# Patient Record
Sex: MALE | Race: WHITE | NOT HISPANIC OR LATINO | Employment: FULL TIME | URBAN - METROPOLITAN AREA
[De-identification: names, ages, dates, MRNs, and addresses within clinical notes are randomized per-mention and may not be internally consistent; named-entity substitution may affect disease eponyms.]

---

## 2017-02-27 ENCOUNTER — HOSPITAL ENCOUNTER (OUTPATIENT)
Dept: LAB | Facility: MEDICAL CENTER | Age: 62
End: 2017-02-27
Attending: INTERNAL MEDICINE
Payer: COMMERCIAL

## 2017-02-27 LAB
ALBUMIN SERPL BCP-MCNC: 4.7 G/DL (ref 3.2–4.9)
ALBUMIN/GLOB SERPL: 1.7 G/DL
ALP SERPL-CCNC: 89 U/L (ref 30–99)
ALT SERPL-CCNC: 22 U/L (ref 2–50)
ANION GAP SERPL CALC-SCNC: 9 MMOL/L (ref 0–11.9)
APPEARANCE UR: CLEAR
AST SERPL-CCNC: 16 U/L (ref 12–45)
BACTERIA #/AREA URNS HPF: ABNORMAL /HPF
BASOPHILS # BLD AUTO: 0.05 K/UL (ref 0–0.12)
BASOPHILS NFR BLD AUTO: 0.6 % (ref 0–1.8)
BILIRUB SERPL-MCNC: 1 MG/DL (ref 0.1–1.5)
BILIRUB UR QL STRIP.AUTO: NEGATIVE
BUN SERPL-MCNC: 31 MG/DL (ref 8–22)
CALCIUM SERPL-MCNC: 9.6 MG/DL (ref 8.5–10.5)
CHLORIDE SERPL-SCNC: 105 MMOL/L (ref 96–112)
CHOLEST SERPL-MCNC: 163 MG/DL (ref 100–199)
CO2 SERPL-SCNC: 27 MMOL/L (ref 20–33)
COLOR UR AUTO: ABNORMAL
CREAT SERPL-MCNC: 1.94 MG/DL (ref 0.5–1.4)
CREAT UR-MCNC: 174.7 MG/DL
EOSINOPHIL # BLD: 0.05 K/UL (ref 0–0.51)
EOSINOPHIL NFR BLD AUTO: 0.6 % (ref 0–6.9)
EPITHELIAL CELLS 1715: ABNORMAL /HPF
ERYTHROCYTE [DISTWIDTH] IN BLOOD BY AUTOMATED COUNT: 47.6 FL (ref 35.9–50)
GLOBULIN SER CALC-MCNC: 2.8 G/DL (ref 1.9–3.5)
GLUCOSE SERPL-MCNC: 102 MG/DL (ref 65–99)
GLUCOSE UR STRIP.AUTO-MCNC: NEGATIVE MG/DL
HCT VFR BLD AUTO: 50.4 % (ref 42–52)
HDLC SERPL-MCNC: 50 MG/DL
HGB BLD-MCNC: 15.2 G/DL (ref 14–18)
IMM GRANULOCYTES # BLD AUTO: 0.07 K/UL (ref 0–0.11)
IMM GRANULOCYTES NFR BLD AUTO: 0.9 % (ref 0–0.9)
KETONES UR STRIP.AUTO-MCNC: NEGATIVE MG/DL
LDLC SERPL CALC-MCNC: 75 MG/DL
LEUKOCYTE ESTERASE UR QL STRIP.AUTO: ABNORMAL
LYMPHOCYTES # BLD: 1.87 K/UL (ref 1–4.8)
LYMPHOCYTES NFR BLD AUTO: 23.1 % (ref 22–41)
MAGNESIUM SERPL-MCNC: 1.9 MG/DL (ref 1.5–2.5)
MCH RBC QN AUTO: 27.6 PG (ref 27–33)
MCHC RBC AUTO-ENTMCNC: 30.2 G/DL (ref 33.7–35.3)
MCV RBC AUTO: 91.6 FL (ref 81.4–97.8)
MICRO URNS: ABNORMAL
MONOCYTES # BLD: 1.03 K/UL (ref 0–0.85)
MONOCYTES NFR BLD AUTO: 12.7 % (ref 0–13.4)
MUCOUS THREADS URNS QL MICRO: ABNORMAL /HPF
NEUTROPHILS # BLD: 5.04 K/UL (ref 1.82–7.42)
NEUTROPHILS NFR BLD AUTO: 62.1 % (ref 44–72)
NITRITE UR QL STRIP.AUTO: NEGATIVE
NRBC # BLD AUTO: 0 K/UL
NRBC BLD-RTO: 0 /100 WBC
PH UR: 6 [PH]
PHOSPHATE SERPL-MCNC: 3.9 MG/DL (ref 2.5–4.5)
PLATELET # BLD AUTO: 142 K/UL (ref 164–446)
PMV BLD AUTO: 12.4 FL (ref 9–12.9)
POTASSIUM SERPL-SCNC: 4.3 MMOL/L (ref 3.6–5.5)
PROT 24H UR-MRATE: 24.8 MG/DL (ref 0–15)
PROT SERPL-MCNC: 7.5 G/DL (ref 6–8.2)
PROT UR QL STRIP: NEGATIVE MG/DL
PROT/CREAT UR: 142 MG/G (ref 15–68)
RBC # BLD AUTO: 5.5 M/UL (ref 4.7–6.1)
RBC #/AREA URNS HPF: ABNORMAL /HPF
RBC UR QL AUTO: NEGATIVE
SODIUM SERPL-SCNC: 141 MMOL/L (ref 135–145)
SP GR UR STRIP.AUTO: 1.02
TRIGL SERPL-MCNC: 191 MG/DL (ref 0–149)
URATE SERPL-MCNC: 5.9 MG/DL (ref 2.5–8.3)
WBC # BLD AUTO: 8.1 K/UL (ref 4.8–10.8)
WBC #/AREA URNS HPF: ABNORMAL /HPF

## 2017-02-27 PROCEDURE — 80053 COMPREHEN METABOLIC PANEL: CPT

## 2017-02-27 PROCEDURE — 84156 ASSAY OF PROTEIN URINE: CPT

## 2017-02-27 PROCEDURE — 84100 ASSAY OF PHOSPHORUS: CPT

## 2017-02-27 PROCEDURE — 81001 URINALYSIS AUTO W/SCOPE: CPT

## 2017-02-27 PROCEDURE — 80061 LIPID PANEL: CPT

## 2017-02-27 PROCEDURE — 36415 COLL VENOUS BLD VENIPUNCTURE: CPT

## 2017-02-27 PROCEDURE — 83735 ASSAY OF MAGNESIUM: CPT

## 2017-02-27 PROCEDURE — 82570 ASSAY OF URINE CREATININE: CPT

## 2017-02-27 PROCEDURE — 85025 COMPLETE CBC W/AUTO DIFF WBC: CPT

## 2017-02-27 PROCEDURE — 80197 ASSAY OF TACROLIMUS: CPT

## 2017-02-27 PROCEDURE — 84550 ASSAY OF BLOOD/URIC ACID: CPT

## 2017-03-01 LAB — TACROLIMUS BLD-MCNC: 6.7 NG/ML

## 2017-06-21 ENCOUNTER — HOSPITAL ENCOUNTER (OUTPATIENT)
Dept: LAB | Facility: MEDICAL CENTER | Age: 62
End: 2017-06-21
Attending: INTERNAL MEDICINE
Payer: COMMERCIAL

## 2017-06-21 LAB
25(OH)D3 SERPL-MCNC: 29 NG/ML (ref 30–100)
ALBUMIN SERPL BCP-MCNC: 4.3 G/DL (ref 3.2–4.9)
ALBUMIN/GLOB SERPL: 1.9 G/DL
ALP SERPL-CCNC: 74 U/L (ref 30–99)
ALT SERPL-CCNC: 17 U/L (ref 2–50)
ANION GAP SERPL CALC-SCNC: 8 MMOL/L (ref 0–11.9)
APPEARANCE UR: CLEAR
AST SERPL-CCNC: 18 U/L (ref 12–45)
BASOPHILS # BLD AUTO: 0.6 % (ref 0–1.8)
BASOPHILS # BLD: 0.03 K/UL (ref 0–0.12)
BILIRUB SERPL-MCNC: 0.8 MG/DL (ref 0.1–1.5)
BILIRUB UR QL STRIP.AUTO: NEGATIVE
BUN SERPL-MCNC: 37 MG/DL (ref 8–22)
CALCIUM SERPL-MCNC: 9.3 MG/DL (ref 8.5–10.5)
CHLORIDE SERPL-SCNC: 107 MMOL/L (ref 96–112)
CHOLEST SERPL-MCNC: 123 MG/DL (ref 100–199)
CO2 SERPL-SCNC: 25 MMOL/L (ref 20–33)
COLOR UR: ABNORMAL
CREAT SERPL-MCNC: 1.9 MG/DL (ref 0.5–1.4)
CREAT UR-MCNC: 80.9 MG/DL
EOSINOPHIL # BLD AUTO: 0.05 K/UL (ref 0–0.51)
EOSINOPHIL NFR BLD: 0.9 % (ref 0–6.9)
ERYTHROCYTE [DISTWIDTH] IN BLOOD BY AUTOMATED COUNT: 47.2 FL (ref 35.9–50)
EST. AVERAGE GLUCOSE BLD GHB EST-MCNC: 166 MG/DL
GFR SERPL CREATININE-BSD FRML MDRD: 36 ML/MIN/1.73 M 2
GLOBULIN SER CALC-MCNC: 2.3 G/DL (ref 1.9–3.5)
GLUCOSE SERPL-MCNC: 91 MG/DL (ref 65–99)
GLUCOSE UR STRIP.AUTO-MCNC: ABNORMAL MG/DL
HBA1C MFR BLD: 7.4 % (ref 0–5.6)
HCT VFR BLD AUTO: 45 % (ref 42–52)
HDLC SERPL-MCNC: 44 MG/DL
HGB BLD-MCNC: 14 G/DL (ref 14–18)
IMM GRANULOCYTES # BLD AUTO: 0.03 K/UL (ref 0–0.11)
IMM GRANULOCYTES NFR BLD AUTO: 0.6 % (ref 0–0.9)
KETONES UR STRIP.AUTO-MCNC: NEGATIVE MG/DL
LDLC SERPL CALC-MCNC: 49 MG/DL
LEUKOCYTE ESTERASE UR QL STRIP.AUTO: ABNORMAL
LYMPHOCYTES # BLD AUTO: 0.99 K/UL (ref 1–4.8)
LYMPHOCYTES NFR BLD: 18.5 % (ref 22–41)
MAGNESIUM SERPL-MCNC: 1.9 MG/DL (ref 1.5–2.5)
MCH RBC QN AUTO: 27.8 PG (ref 27–33)
MCHC RBC AUTO-ENTMCNC: 31.1 G/DL (ref 33.7–35.3)
MCV RBC AUTO: 89.5 FL (ref 81.4–97.8)
MICRO URNS: ABNORMAL
MONOCYTES # BLD AUTO: 0.62 K/UL (ref 0–0.85)
MONOCYTES NFR BLD AUTO: 11.6 % (ref 0–13.4)
NEUTROPHILS # BLD AUTO: 3.62 K/UL (ref 1.82–7.42)
NEUTROPHILS NFR BLD: 67.8 % (ref 44–72)
NITRITE UR QL STRIP.AUTO: NEGATIVE
NRBC # BLD AUTO: 0 K/UL
NRBC BLD AUTO-RTO: 0 /100 WBC
PH UR STRIP.AUTO: 6 [PH]
PHOSPHATE SERPL-MCNC: 3.3 MG/DL (ref 2.5–4.5)
PLATELET # BLD AUTO: 112 K/UL (ref 164–446)
PMV BLD AUTO: 12.1 FL (ref 9–12.9)
POTASSIUM SERPL-SCNC: 4.1 MMOL/L (ref 3.6–5.5)
PROT SERPL-MCNC: 6.6 G/DL (ref 6–8.2)
PROT UR QL STRIP: NEGATIVE MG/DL
PROT UR-MCNC: 10 MG/DL (ref 0–15)
PROT/CREAT UR: 124 MG/G (ref 15–68)
PTH-INTACT SERPL-MCNC: 65.6 PG/ML (ref 14–72)
RBC # BLD AUTO: 5.03 M/UL (ref 4.7–6.1)
RBC # URNS HPF: ABNORMAL /HPF
RBC UR QL AUTO: NEGATIVE
SODIUM SERPL-SCNC: 140 MMOL/L (ref 135–145)
SP GR UR STRIP.AUTO: 1.01
TRIGL SERPL-MCNC: 152 MG/DL (ref 0–149)
URATE SERPL-MCNC: 6.7 MG/DL (ref 2.5–8.3)
WBC # BLD AUTO: 5.3 K/UL (ref 4.8–10.8)
WBC #/AREA URNS HPF: ABNORMAL /HPF

## 2017-06-21 PROCEDURE — 84100 ASSAY OF PHOSPHORUS: CPT

## 2017-06-21 PROCEDURE — 81001 URINALYSIS AUTO W/SCOPE: CPT

## 2017-06-21 PROCEDURE — 82306 VITAMIN D 25 HYDROXY: CPT

## 2017-06-21 PROCEDURE — 84550 ASSAY OF BLOOD/URIC ACID: CPT

## 2017-06-21 PROCEDURE — 85025 COMPLETE CBC W/AUTO DIFF WBC: CPT

## 2017-06-21 PROCEDURE — 36415 COLL VENOUS BLD VENIPUNCTURE: CPT

## 2017-06-21 PROCEDURE — 83036 HEMOGLOBIN GLYCOSYLATED A1C: CPT

## 2017-06-21 PROCEDURE — 80053 COMPREHEN METABOLIC PANEL: CPT

## 2017-06-21 PROCEDURE — 83735 ASSAY OF MAGNESIUM: CPT

## 2017-06-21 PROCEDURE — 83970 ASSAY OF PARATHORMONE: CPT

## 2017-06-21 PROCEDURE — 80061 LIPID PANEL: CPT

## 2017-06-21 PROCEDURE — 82570 ASSAY OF URINE CREATININE: CPT

## 2017-06-21 PROCEDURE — 84156 ASSAY OF PROTEIN URINE: CPT

## 2017-08-01 DIAGNOSIS — E78.5 HYPERLIPOPROTEINEMIA: Chronic | ICD-10-CM

## 2017-08-01 DIAGNOSIS — Z95.5 S/P INSERTION OF NON-DRUG ELUTING CORONARY ARTERY STENT: ICD-10-CM

## 2017-08-01 RX ORDER — ATORVASTATIN CALCIUM 80 MG/1
80 TABLET, FILM COATED ORAL EVERY EVENING
Qty: 90 TAB | Refills: 3 | OUTPATIENT
Start: 2017-08-01 | End: 2020-10-20

## 2017-09-26 ENCOUNTER — HOSPITAL ENCOUNTER (INPATIENT)
Facility: MEDICAL CENTER | Age: 62
LOS: 2 days | DRG: 871 | End: 2017-09-29
Attending: EMERGENCY MEDICINE | Admitting: INTERNAL MEDICINE
Payer: COMMERCIAL

## 2017-09-26 ENCOUNTER — HOSPITAL ENCOUNTER (OUTPATIENT)
Dept: LAB | Facility: MEDICAL CENTER | Age: 62
End: 2017-09-26
Attending: EMERGENCY MEDICINE
Payer: COMMERCIAL

## 2017-09-26 ENCOUNTER — OFFICE VISIT (OUTPATIENT)
Dept: URGENT CARE | Facility: PHYSICIAN GROUP | Age: 62
End: 2017-09-26
Payer: COMMERCIAL

## 2017-09-26 ENCOUNTER — TELEPHONE (OUTPATIENT)
Dept: URGENT CARE | Facility: PHYSICIAN GROUP | Age: 62
End: 2017-09-26

## 2017-09-26 ENCOUNTER — APPOINTMENT (OUTPATIENT)
Dept: RADIOLOGY | Facility: MEDICAL CENTER | Age: 62
DRG: 871 | End: 2017-09-26
Payer: COMMERCIAL

## 2017-09-26 ENCOUNTER — APPOINTMENT (OUTPATIENT)
Dept: RADIOLOGY | Facility: MEDICAL CENTER | Age: 62
DRG: 871 | End: 2017-09-26
Attending: EMERGENCY MEDICINE
Payer: COMMERCIAL

## 2017-09-26 VITALS
HEIGHT: 78 IN | RESPIRATION RATE: 18 BRPM | DIASTOLIC BLOOD PRESSURE: 64 MMHG | BODY MASS INDEX: 28.69 KG/M2 | OXYGEN SATURATION: 96 % | HEART RATE: 99 BPM | SYSTOLIC BLOOD PRESSURE: 106 MMHG | WEIGHT: 248 LBS | TEMPERATURE: 98.4 F

## 2017-09-26 DIAGNOSIS — D84.9 IMMUNOCOMPROMISED (HCC): ICD-10-CM

## 2017-09-26 DIAGNOSIS — R53.81 MALAISE AND FATIGUE: ICD-10-CM

## 2017-09-26 DIAGNOSIS — N28.9 RENAL INSUFFICIENCY: ICD-10-CM

## 2017-09-26 DIAGNOSIS — J18.9 PNEUMONIA OF LOWER LOBE DUE TO INFECTIOUS ORGANISM, UNSPECIFIED LATERALITY: ICD-10-CM

## 2017-09-26 DIAGNOSIS — R53.83 MALAISE AND FATIGUE: ICD-10-CM

## 2017-09-26 DIAGNOSIS — Z94.0 HISTORY OF KIDNEY TRANSPLANT: ICD-10-CM

## 2017-09-26 LAB
ALBUMIN SERPL BCP-MCNC: 3.5 G/DL (ref 3.2–4.9)
ALBUMIN SERPL BCP-MCNC: 3.6 G/DL (ref 3.2–4.9)
ALBUMIN/GLOB SERPL: 1.3 G/DL
ALBUMIN/GLOB SERPL: 1.3 G/DL
ALP SERPL-CCNC: 147 U/L (ref 30–99)
ALP SERPL-CCNC: 152 U/L (ref 30–99)
ALT SERPL-CCNC: 33 U/L (ref 2–50)
ALT SERPL-CCNC: 40 U/L (ref 2–50)
ANION GAP SERPL CALC-SCNC: 11 MMOL/L (ref 0–11.9)
ANION GAP SERPL CALC-SCNC: 12 MMOL/L (ref 0–11.9)
ANISOCYTOSIS BLD QL SMEAR: ABNORMAL
APPEARANCE UR: NORMAL
AST SERPL-CCNC: 29 U/L (ref 12–45)
AST SERPL-CCNC: 32 U/L (ref 12–45)
BASOPHILS # BLD AUTO: 0 % (ref 0–1.8)
BASOPHILS # BLD AUTO: 0 % (ref 0–1.8)
BASOPHILS # BLD: 0 K/UL (ref 0–0.12)
BASOPHILS # BLD: 0 K/UL (ref 0–0.12)
BILIRUB SERPL-MCNC: 2 MG/DL (ref 0.1–1.5)
BILIRUB SERPL-MCNC: 2.1 MG/DL (ref 0.1–1.5)
BILIRUB UR STRIP-MCNC: NORMAL MG/DL
BUN SERPL-MCNC: 39 MG/DL (ref 8–22)
BUN SERPL-MCNC: 42 MG/DL (ref 8–22)
BURR CELLS BLD QL SMEAR: NORMAL
CALCIUM SERPL-MCNC: 8.6 MG/DL (ref 8.5–10.5)
CALCIUM SERPL-MCNC: 8.8 MG/DL (ref 8.5–10.5)
CHLORIDE SERPL-SCNC: 100 MMOL/L (ref 96–112)
CHLORIDE SERPL-SCNC: 101 MMOL/L (ref 96–112)
CO2 SERPL-SCNC: 21 MMOL/L (ref 20–33)
CO2 SERPL-SCNC: 22 MMOL/L (ref 20–33)
COLOR UR AUTO: NORMAL
CREAT SERPL-MCNC: 2.42 MG/DL (ref 0.5–1.4)
CREAT SERPL-MCNC: 2.96 MG/DL (ref 0.5–1.4)
EOSINOPHIL # BLD AUTO: 0 K/UL (ref 0–0.51)
EOSINOPHIL # BLD AUTO: 0.1 K/UL (ref 0–0.51)
EOSINOPHIL NFR BLD: 0 % (ref 0–6.9)
EOSINOPHIL NFR BLD: 0.9 % (ref 0–6.9)
ERYTHROCYTE [DISTWIDTH] IN BLOOD BY AUTOMATED COUNT: 47.7 FL (ref 35.9–50)
ERYTHROCYTE [DISTWIDTH] IN BLOOD BY AUTOMATED COUNT: 47.8 FL (ref 35.9–50)
ERYTHROCYTE [SEDIMENTATION RATE] IN BLOOD BY WESTERGREN METHOD: 45 MM/HOUR (ref 0–20)
FLUAV+FLUBV AG SPEC QL IA: NEGATIVE
GFR SERPL CREATININE-BSD FRML MDRD: 22 ML/MIN/1.73 M 2
GFR SERPL CREATININE-BSD FRML MDRD: 27 ML/MIN/1.73 M 2
GIANT PLATELETS BLD QL SMEAR: NORMAL
GLOBULIN SER CALC-MCNC: 2.8 G/DL (ref 1.9–3.5)
GLOBULIN SER CALC-MCNC: 2.8 G/DL (ref 1.9–3.5)
GLUCOSE BLD-MCNC: 159 MG/DL (ref 70–100)
GLUCOSE SERPL-MCNC: 135 MG/DL (ref 65–99)
GLUCOSE SERPL-MCNC: 162 MG/DL (ref 65–99)
GLUCOSE UR STRIP.AUTO-MCNC: NORMAL MG/DL
HCT VFR BLD AUTO: 42.1 % (ref 42–52)
HCT VFR BLD AUTO: 44 % (ref 42–52)
HGB BLD-MCNC: 13.3 G/DL (ref 14–18)
HGB BLD-MCNC: 13.8 G/DL (ref 14–18)
INT CON NEG: NEGATIVE
INT CON POS: POSITIVE
KETONES UR STRIP.AUTO-MCNC: NORMAL MG/DL
LACTATE BLD-SCNC: 1.2 MMOL/L (ref 0.5–2)
LEUKOCYTE ESTERASE UR QL STRIP.AUTO: NORMAL
LYMPHOCYTES # BLD AUTO: 0 K/UL (ref 1–4.8)
LYMPHOCYTES # BLD AUTO: 0.56 K/UL (ref 1–4.8)
LYMPHOCYTES NFR BLD: 0 % (ref 22–41)
LYMPHOCYTES NFR BLD: 4.4 % (ref 22–41)
MANUAL DIFF BLD: ABNORMAL
MANUAL DIFF BLD: ABNORMAL
MCH RBC QN AUTO: 27.4 PG (ref 27–33)
MCH RBC QN AUTO: 27.5 PG (ref 27–33)
MCHC RBC AUTO-ENTMCNC: 31.4 G/DL (ref 33.7–35.3)
MCHC RBC AUTO-ENTMCNC: 31.6 G/DL (ref 33.7–35.3)
MCV RBC AUTO: 86.8 FL (ref 81.4–97.8)
MCV RBC AUTO: 87.8 FL (ref 81.4–97.8)
MICROCYTES BLD QL SMEAR: ABNORMAL
MONOCYTES # BLD AUTO: 0.19 K/UL (ref 0–0.85)
MONOCYTES # BLD AUTO: 0.33 K/UL (ref 0–0.85)
MONOCYTES NFR BLD AUTO: 1.7 % (ref 0–13.4)
MONOCYTES NFR BLD AUTO: 2.6 % (ref 0–13.4)
MORPHOLOGY BLD-IMP: NORMAL
MORPHOLOGY BLD-IMP: NORMAL
NEUTROPHILS # BLD AUTO: 11.01 K/UL (ref 1.82–7.42)
NEUTROPHILS # BLD AUTO: 11.9 K/UL (ref 1.82–7.42)
NEUTROPHILS NFR BLD: 76.5 % (ref 44–72)
NEUTROPHILS NFR BLD: 84.4 % (ref 44–72)
NEUTS BAND NFR BLD MANUAL: 13 % (ref 0–10)
NEUTS BAND NFR BLD MANUAL: 16.5 % (ref 0–10)
NITRITE UR QL STRIP.AUTO: NORMAL
NRBC # BLD AUTO: 0 K/UL
NRBC # BLD AUTO: 0 K/UL
NRBC BLD AUTO-RTO: 0 /100 WBC
NRBC BLD AUTO-RTO: 0 /100 WBC
OVALOCYTES BLD QL SMEAR: NORMAL
OVALOCYTES BLD QL SMEAR: NORMAL
PH UR STRIP.AUTO: 5 [PH] (ref 5–8)
PLATELET # BLD AUTO: 116 K/UL (ref 164–446)
PLATELET # BLD AUTO: 116 K/UL (ref 164–446)
PLATELET BLD QL SMEAR: NORMAL
PLATELET BLD QL SMEAR: NORMAL
PMV BLD AUTO: 11.7 FL (ref 9–12.9)
PMV BLD AUTO: 12 FL (ref 9–12.9)
POIKILOCYTOSIS BLD QL SMEAR: NORMAL
POIKILOCYTOSIS BLD QL SMEAR: NORMAL
POLYCHROMASIA BLD QL SMEAR: NORMAL
POTASSIUM SERPL-SCNC: 4.5 MMOL/L (ref 3.6–5.5)
POTASSIUM SERPL-SCNC: 4.6 MMOL/L (ref 3.6–5.5)
PROT SERPL-MCNC: 6.3 G/DL (ref 6–8.2)
PROT SERPL-MCNC: 6.4 G/DL (ref 6–8.2)
PROT UR QL STRIP: 30 MG/DL
RBC # BLD AUTO: 4.85 M/UL (ref 4.7–6.1)
RBC # BLD AUTO: 5.01 M/UL (ref 4.7–6.1)
RBC BLD AUTO: PRESENT
RBC BLD AUTO: PRESENT
RBC UR QL AUTO: NORMAL
SMUDGE CELLS BLD QL SMEAR: NORMAL
SODIUM SERPL-SCNC: 133 MMOL/L (ref 135–145)
SODIUM SERPL-SCNC: 134 MMOL/L (ref 135–145)
SP GR UR STRIP.AUTO: 1.02
UROBILINOGEN UR STRIP-MCNC: NORMAL MG/DL
WBC # BLD AUTO: 11.3 K/UL (ref 4.8–10.8)
WBC # BLD AUTO: 12.8 K/UL (ref 4.8–10.8)

## 2017-09-26 PROCEDURE — 80053 COMPREHEN METABOLIC PANEL: CPT | Mod: 91

## 2017-09-26 PROCEDURE — 99203 OFFICE O/P NEW LOW 30 MIN: CPT | Performed by: EMERGENCY MEDICINE

## 2017-09-26 PROCEDURE — 85652 RBC SED RATE AUTOMATED: CPT

## 2017-09-26 PROCEDURE — 700105 HCHG RX REV CODE 258: Performed by: EMERGENCY MEDICINE

## 2017-09-26 PROCEDURE — 87804 INFLUENZA ASSAY W/OPTIC: CPT | Performed by: EMERGENCY MEDICINE

## 2017-09-26 PROCEDURE — 700111 HCHG RX REV CODE 636 W/ 250 OVERRIDE (IP): Performed by: EMERGENCY MEDICINE

## 2017-09-26 PROCEDURE — 83605 ASSAY OF LACTIC ACID: CPT

## 2017-09-26 PROCEDURE — 85027 COMPLETE CBC AUTOMATED: CPT

## 2017-09-26 PROCEDURE — 85027 COMPLETE CBC AUTOMATED: CPT | Mod: 91

## 2017-09-26 PROCEDURE — 36415 COLL VENOUS BLD VENIPUNCTURE: CPT

## 2017-09-26 PROCEDURE — 82962 GLUCOSE BLOOD TEST: CPT | Performed by: EMERGENCY MEDICINE

## 2017-09-26 PROCEDURE — 81002 URINALYSIS NONAUTO W/O SCOPE: CPT | Performed by: EMERGENCY MEDICINE

## 2017-09-26 PROCEDURE — 85007 BL SMEAR W/DIFF WBC COUNT: CPT

## 2017-09-26 PROCEDURE — 96365 THER/PROPH/DIAG IV INF INIT: CPT

## 2017-09-26 PROCEDURE — 80053 COMPREHEN METABOLIC PANEL: CPT

## 2017-09-26 PROCEDURE — 93005 ELECTROCARDIOGRAM TRACING: CPT | Performed by: EMERGENCY MEDICINE

## 2017-09-26 PROCEDURE — 85007 BL SMEAR W/DIFF WBC COUNT: CPT | Mod: 91

## 2017-09-26 PROCEDURE — 71010 DX-CHEST-PORTABLE (1 VIEW): CPT

## 2017-09-26 PROCEDURE — 87040 BLOOD CULTURE FOR BACTERIA: CPT | Mod: 91

## 2017-09-26 PROCEDURE — 99285 EMERGENCY DEPT VISIT HI MDM: CPT

## 2017-09-26 RX ORDER — MYCOPHENOLATE MOFETIL 250 MG/1
500 CAPSULE ORAL 2 TIMES DAILY
Status: ON HOLD | COMMUNITY
End: 2020-11-15

## 2017-09-26 RX ORDER — SODIUM CHLORIDE 9 MG/ML
1000 INJECTION, SOLUTION INTRAVENOUS ONCE
Status: COMPLETED | OUTPATIENT
Start: 2017-09-26 | End: 2017-09-27

## 2017-09-26 RX ORDER — AZITHROMYCIN 500 MG/1
500 INJECTION, POWDER, LYOPHILIZED, FOR SOLUTION INTRAVENOUS ONCE
Status: COMPLETED | OUTPATIENT
Start: 2017-09-26 | End: 2017-09-27

## 2017-09-26 RX ORDER — CEFTRIAXONE 2 G/1
2 INJECTION, POWDER, FOR SOLUTION INTRAMUSCULAR; INTRAVENOUS ONCE
Status: COMPLETED | OUTPATIENT
Start: 2017-09-26 | End: 2017-09-26

## 2017-09-26 RX ORDER — PIOGLITAZONEHYDROCHLORIDE 45 MG/1
45 TABLET ORAL DAILY
COMMUNITY
End: 2020-12-04 | Stop reason: SDUPTHER

## 2017-09-26 RX ORDER — NICOTINE POLACRILEX 4 MG/1
20 GUM, CHEWING ORAL DAILY
COMMUNITY
End: 2020-10-22 | Stop reason: SDUPTHER

## 2017-09-26 RX ADMIN — SODIUM CHLORIDE 1000 ML: 9 INJECTION, SOLUTION INTRAVENOUS at 22:43

## 2017-09-26 RX ADMIN — CEFTRIAXONE SODIUM 2 G: 2 INJECTION, POWDER, FOR SOLUTION INTRAMUSCULAR; INTRAVENOUS at 23:07

## 2017-09-26 ASSESSMENT — ENCOUNTER SYMPTOMS
VOMITING: 0
HEADACHES: 0
NAUSEA: 0
HEMOPTYSIS: 0
WEAKNESS: 1
NERVOUS/ANXIOUS: 0
ABDOMINAL PAIN: 0
SPEECH CHANGE: 0
VOMITING: 0
ABDOMINAL PAIN: 0
NAUSEA: 0
CHILLS: 1
NECK PAIN: 0
COUGH: 1
ROS SKIN COMMENTS: SALLOW
MYALGIAS: 1
CONSTIPATION: 0
SENSORY CHANGE: 0
SHORTNESS OF BREATH: 0
DIZZINESS: 1
FEVER: 1
CHILLS: 1
COUGH: 0

## 2017-09-26 ASSESSMENT — LIFESTYLE VARIABLES: DO YOU DRINK ALCOHOL: NO

## 2017-09-26 ASSESSMENT — PAIN SCALES - GENERAL: PAINLEVEL_OUTOF10: 6

## 2017-09-26 NOTE — PROGRESS NOTES
Subjective:      Jama Altman is a 61 y.o. male who presents with Cough (with chills x 4 days )            HPI and patient is a pleasant 61-year-old male complaining of malaise and chills for the past 4 days. He recently traveled to near [place] for high-powered boat racing. He denies any exposure to anyone ill. He has chills, malaise and myalgias. He states he feels like he has influenza.    Nothing untoward happened [place]    Patient is a kidney transplant patient (2010),  as well as, having had significant cardiac pathology including status post myocardial infarction and steroid-induced type 2 diabetes. Patient states his glucoses have been relatively well controlled.   Patient is followed by [facility] transplant team that sees the patient in [place].  Patient states he's not been ill since his transplant  Allergies   Allergen Reactions   • Doxycycline Rash     Sweats and shakes     Social History     Social History   • Marital status:      Spouse name: N/A   • Number of children: N/A   • Years of education: N/A     Occupational History   • Not on file.     Social History Main Topics   • Smoking status: Never Smoker   • Smokeless tobacco: Never Used   • Alcohol use No   • Drug use: No   • Sexual activity: Not on file     Other Topics Concern   • Not on file     Social History Narrative   • No narrative on file     Past Medical History:   Diagnosis Date   • Polycystic kidney 9/10/10    RIGHT KIDNEY TRANSPLANT   • Benign essential hypertension    • Hyperlipoproteinemia    • Hypertension     not on meds anymore   • Pain    • Sleep apnea    • Snoring      Current Outpatient Prescriptions on File Prior to Visit   Medication Sig Dispense Refill   • atorvastatin (LIPITOR) 80 MG tablet Take 1 Tab by mouth every evening. 90 Tab 3   • metoprolol SR (TOPROL XL) 25 MG TABLET SR 24 HR TAKE ONE TABLET BY MOUTH EVERY DAY 90 Tab 3   • linagliptin (TRADJENTA) 5 MG TABS tablet  "Take 1 Tab by mouth every day. 30 Tab 11   • aspirin EC (ECOTRIN) 325 MG TBEC Take 81 mg by mouth every day. 60 Tab    • tramadol (ULTRAM) 50 MG TABS Take  mg by mouth every four hours as needed.     • tacrolimus (PROGRAF) 1 MG CAPS Take 1 mg by mouth every evening.     • mycophenolate (CELLCEPT) 500 MG tablet Take 500 mg by mouth 2 times a day.     • predniSONE (DELTASONE) 5 MG TABS Take 5 mg by mouth every day. Take with food      • glyBURIDE (DIABETA) 5 MG TABS Take 10 mg by mouth 2 times a day, with meals.     • glucose blood strip 1 Strip by Other route 3 times a day before meals. 100 Each 11   • nitroglycerin (NITROSTAT) 0.4 MG SUBL Place 1 Tab under tongue as needed for Chest Pain (May repeat X 3 5 min. apart). 25 Tab 3     No current facility-administered medications on file prior to visit.    No family history on file.  Review of Systems   Constitutional: Positive for chills and malaise/fatigue.   HENT: Negative for congestion.    Respiratory: Negative for cough and hemoptysis.    Cardiovascular: Negative for chest pain.   Gastrointestinal: Negative for abdominal pain, nausea and vomiting.   Musculoskeletal: Positive for myalgias.   Skin:        Sallow   Neurological: Positive for dizziness and weakness. Negative for sensory change, speech change and headaches.   Psychiatric/Behavioral: The patient is not nervous/anxious.           Objective:     /64   Pulse 99   Temp 36.9 °C (98.4 °F)   Resp 18   Ht 1.969 m (6' 5.5\")   Wt 112.5 kg (248 lb)   SpO2 96%   BMI 29.03 kg/m²      Physical Exam   Constitutional: He appears well-developed and well-nourished. He appears distressed.   HENT:   Head: Normocephalic and atraumatic.   Right Ear: External ear normal.   Left Ear: External ear normal.   Dry mucous membranes.   Eyes: Right eye exhibits no discharge. Left eye exhibits no discharge. No scleral icterus.   Cardiovascular: Normal rate.    Pulmonary/Chest: Effort normal and breath sounds normal. "   Abdominal: He exhibits no distension.   Musculoskeletal: He exhibits edema (patient states he has chronic edema 1+).   Skin: Skin is warm and dry.   Psychiatric: He has a normal mood and affect. His behavior is normal.          Point-of-care urine specific gravity 1.020     Rapid flu negative A/B     Assessment/Plan:     Diagnosis: Generalized malaise                       Status post renal transplant (2010)                        Status post MI    Patient will have a CBC metabolic panel urine glucose as well as a flu A/B (Negative). I've recommended that he may need to go to the emergency department for complete evaluation due to the critical nature of his comorbidities which include the renal transplant coronary artery disease steroid-induced diabetes.    Addendum 8 PM labs returned with elevated white count of 12.816% stabs GFR decreased to 27 from 35/36 which is spent for the past few years patient's creatinine is currently 2.4 up from 1.90.                        Leukocytosis with bandemia                          Decreased GFR 35 down to 27 (creatinine 2.4)  I called the patient felt that he best be evaluated tonight in the emergency department he agreed. He will be driven to the emergency department I called and spoke the on-duty emergency physician who was stated she would have him evaluated.

## 2017-09-27 ENCOUNTER — RESOLUTE PROFESSIONAL BILLING HOSPITAL PROF FEE (OUTPATIENT)
Dept: HOSPITALIST | Facility: MEDICAL CENTER | Age: 62
End: 2017-09-27
Payer: COMMERCIAL

## 2017-09-27 ENCOUNTER — APPOINTMENT (OUTPATIENT)
Dept: RADIOLOGY | Facility: MEDICAL CENTER | Age: 62
DRG: 871 | End: 2017-09-27
Attending: INTERNAL MEDICINE
Payer: COMMERCIAL

## 2017-09-27 PROBLEM — J18.9 PNEUMONIA: Status: ACTIVE | Noted: 2017-09-27

## 2017-09-27 PROBLEM — A41.9 SEPSIS, UNSPECIFIED: Status: ACTIVE | Noted: 2017-09-27

## 2017-09-27 PROBLEM — N17.9 AKI (ACUTE KIDNEY INJURY) (HCC): Status: ACTIVE | Noted: 2017-09-27

## 2017-09-27 LAB
ANION GAP SERPL CALC-SCNC: 11 MMOL/L (ref 0–11.9)
ANISOCYTOSIS BLD QL SMEAR: ABNORMAL
APPEARANCE UR: ABNORMAL
BACTERIA #/AREA URNS HPF: ABNORMAL /HPF
BASOPHILS # BLD AUTO: 0.8 % (ref 0–1.8)
BASOPHILS # BLD: 0.08 K/UL (ref 0–0.12)
BILIRUB UR QL STRIP.AUTO: ABNORMAL
BLOOD CULTURE HOLD CXBCH: NORMAL
BUN SERPL-MCNC: 46 MG/DL (ref 8–22)
CALCIUM SERPL-MCNC: 7.7 MG/DL (ref 8.5–10.5)
CHLORIDE SERPL-SCNC: 107 MMOL/L (ref 96–112)
CHLORIDE UR-SCNC: 20 MMOL/L
CO2 SERPL-SCNC: 20 MMOL/L (ref 20–33)
COLOR UR: ABNORMAL
CREAT SERPL-MCNC: 2.98 MG/DL (ref 0.5–1.4)
CREAT UR-MCNC: 177.6 MG/DL
EKG IMPRESSION: NORMAL
EOSINOPHIL # BLD AUTO: 0.08 K/UL (ref 0–0.51)
EOSINOPHIL NFR BLD: 0.8 % (ref 0–6.9)
EPI CELLS #/AREA URNS HPF: ABNORMAL /HPF
ERYTHROCYTE [DISTWIDTH] IN BLOOD BY AUTOMATED COUNT: 47.3 FL (ref 35.9–50)
GFR SERPL CREATININE-BSD FRML MDRD: 22 ML/MIN/1.73 M 2
GLUCOSE BLD-MCNC: 41 MG/DL (ref 65–99)
GLUCOSE BLD-MCNC: 74 MG/DL (ref 65–99)
GLUCOSE BLD-MCNC: 93 MG/DL (ref 65–99)
GLUCOSE SERPL-MCNC: 75 MG/DL (ref 65–99)
GLUCOSE UR STRIP.AUTO-MCNC: NEGATIVE MG/DL
GRAN CASTS #/AREA URNS LPF: ABNORMAL /LPF
HCT VFR BLD AUTO: 38.7 % (ref 42–52)
HGB BLD-MCNC: 12.3 G/DL (ref 14–18)
HYALINE CASTS #/AREA URNS LPF: ABNORMAL /LPF
KETONES UR STRIP.AUTO-MCNC: NEGATIVE MG/DL
LACTATE BLD-SCNC: 1.1 MMOL/L (ref 0.5–2)
LACTATE BLD-SCNC: 1.4 MMOL/L (ref 0.5–2)
LEUKOCYTE ESTERASE UR QL STRIP.AUTO: ABNORMAL
LYMPHOCYTES # BLD AUTO: 0.27 K/UL (ref 1–4.8)
LYMPHOCYTES NFR BLD: 2.6 % (ref 22–41)
MANUAL DIFF BLD: NORMAL
MCH RBC QN AUTO: 27.3 PG (ref 27–33)
MCHC RBC AUTO-ENTMCNC: 31.8 G/DL (ref 33.7–35.3)
MCV RBC AUTO: 85.8 FL (ref 81.4–97.8)
METAMYELOCYTES NFR BLD MANUAL: 0.9 %
MICRO URNS: ABNORMAL
MICROCYTES BLD QL SMEAR: ABNORMAL
MONOCYTES # BLD AUTO: 0.35 K/UL (ref 0–0.85)
MONOCYTES NFR BLD AUTO: 3.4 % (ref 0–13.4)
MORPHOLOGY BLD-IMP: NORMAL
NEUTROPHILS # BLD AUTO: 9.33 K/UL (ref 1.82–7.42)
NEUTROPHILS NFR BLD: 88.9 % (ref 44–72)
NEUTS BAND NFR BLD MANUAL: 2.6 % (ref 0–10)
NITRITE UR QL STRIP.AUTO: NEGATIVE
NRBC # BLD AUTO: 0 K/UL
NRBC BLD AUTO-RTO: 0 /100 WBC
PH UR STRIP.AUTO: 5 [PH]
PLATELET # BLD AUTO: 106 K/UL (ref 164–446)
PMV BLD AUTO: 11.7 FL (ref 9–12.9)
POTASSIUM SERPL-SCNC: 4.1 MMOL/L (ref 3.6–5.5)
POTASSIUM UR-SCNC: 27.5 MMOL/L
PROCALCITONIN SERPL-MCNC: 1.08 NG/ML
PROT UR QL STRIP: 30 MG/DL
PROT UR-MCNC: 149.6 MG/DL (ref 0–15)
RBC # BLD AUTO: 4.51 M/UL (ref 4.7–6.1)
RBC # URNS HPF: ABNORMAL /HPF
RBC BLD AUTO: PRESENT
RBC UR QL AUTO: ABNORMAL
SODIUM SERPL-SCNC: 138 MMOL/L (ref 135–145)
SODIUM UR-SCNC: 29 MMOL/L
SP GR UR STRIP.AUTO: 1.02
UROBILINOGEN UR STRIP.AUTO-MCNC: 1 MG/DL
WBC # BLD AUTO: 10.2 K/UL (ref 4.8–10.8)
WBC #/AREA URNS HPF: ABNORMAL /HPF

## 2017-09-27 PROCEDURE — 87086 URINE CULTURE/COLONY COUNT: CPT

## 2017-09-27 PROCEDURE — 96372 THER/PROPH/DIAG INJ SC/IM: CPT

## 2017-09-27 PROCEDURE — 700111 HCHG RX REV CODE 636 W/ 250 OVERRIDE (IP): Performed by: STUDENT IN AN ORGANIZED HEALTH CARE EDUCATION/TRAINING PROGRAM

## 2017-09-27 PROCEDURE — 36415 COLL VENOUS BLD VENIPUNCTURE: CPT

## 2017-09-27 PROCEDURE — 80197 ASSAY OF TACROLIMUS: CPT

## 2017-09-27 PROCEDURE — 82436 ASSAY OF URINE CHLORIDE: CPT

## 2017-09-27 PROCEDURE — 700105 HCHG RX REV CODE 258: Performed by: INTERNAL MEDICINE

## 2017-09-27 PROCEDURE — 81001 URINALYSIS AUTO W/SCOPE: CPT

## 2017-09-27 PROCEDURE — 80048 BASIC METABOLIC PNL TOTAL CA: CPT

## 2017-09-27 PROCEDURE — 700102 HCHG RX REV CODE 250 W/ 637 OVERRIDE(OP): Performed by: INTERNAL MEDICINE

## 2017-09-27 PROCEDURE — 84300 ASSAY OF URINE SODIUM: CPT

## 2017-09-27 PROCEDURE — 85027 COMPLETE CBC AUTOMATED: CPT

## 2017-09-27 PROCEDURE — 84133 ASSAY OF URINE POTASSIUM: CPT

## 2017-09-27 PROCEDURE — 82962 GLUCOSE BLOOD TEST: CPT | Mod: 91

## 2017-09-27 PROCEDURE — 96361 HYDRATE IV INFUSION ADD-ON: CPT

## 2017-09-27 PROCEDURE — 84156 ASSAY OF PROTEIN URINE: CPT

## 2017-09-27 PROCEDURE — A9270 NON-COVERED ITEM OR SERVICE: HCPCS | Performed by: STUDENT IN AN ORGANIZED HEALTH CARE EDUCATION/TRAINING PROGRAM

## 2017-09-27 PROCEDURE — 84145 PROCALCITONIN (PCT): CPT

## 2017-09-27 PROCEDURE — 700111 HCHG RX REV CODE 636 W/ 250 OVERRIDE (IP): Performed by: EMERGENCY MEDICINE

## 2017-09-27 PROCEDURE — 83605 ASSAY OF LACTIC ACID: CPT

## 2017-09-27 PROCEDURE — 87040 BLOOD CULTURE FOR BACTERIA: CPT

## 2017-09-27 PROCEDURE — A9270 NON-COVERED ITEM OR SERVICE: HCPCS | Performed by: INTERNAL MEDICINE

## 2017-09-27 PROCEDURE — 85007 BL SMEAR W/DIFF WBC COUNT: CPT

## 2017-09-27 PROCEDURE — 99223 1ST HOSP IP/OBS HIGH 75: CPT | Mod: GC | Performed by: INTERNAL MEDICINE

## 2017-09-27 PROCEDURE — 82570 ASSAY OF URINE CREATININE: CPT

## 2017-09-27 PROCEDURE — 770020 HCHG ROOM/CARE - TELE (206)

## 2017-09-27 PROCEDURE — 700111 HCHG RX REV CODE 636 W/ 250 OVERRIDE (IP): Performed by: INTERNAL MEDICINE

## 2017-09-27 PROCEDURE — 71020 DX-CHEST-2 VIEWS: CPT

## 2017-09-27 PROCEDURE — 700102 HCHG RX REV CODE 250 W/ 637 OVERRIDE(OP): Performed by: STUDENT IN AN ORGANIZED HEALTH CARE EDUCATION/TRAINING PROGRAM

## 2017-09-27 PROCEDURE — 96367 TX/PROPH/DG ADDL SEQ IV INF: CPT

## 2017-09-27 RX ORDER — POLYETHYLENE GLYCOL 3350 17 G/17G
1 POWDER, FOR SOLUTION ORAL
Status: DISCONTINUED | OUTPATIENT
Start: 2017-09-27 | End: 2017-09-30 | Stop reason: HOSPADM

## 2017-09-27 RX ORDER — HEPARIN SODIUM 5000 [USP'U]/ML
5000 INJECTION, SOLUTION INTRAVENOUS; SUBCUTANEOUS EVERY 8 HOURS
Status: DISCONTINUED | OUTPATIENT
Start: 2017-09-27 | End: 2017-09-30 | Stop reason: HOSPADM

## 2017-09-27 RX ORDER — MYCOPHENOLATE MOFETIL 250 MG/1
500 CAPSULE ORAL
Status: DISCONTINUED | OUTPATIENT
Start: 2017-09-27 | End: 2017-09-30 | Stop reason: HOSPADM

## 2017-09-27 RX ORDER — PREDNISONE 5 MG/1
5 TABLET ORAL DAILY
Status: DISCONTINUED | OUTPATIENT
Start: 2017-09-27 | End: 2017-09-30 | Stop reason: HOSPADM

## 2017-09-27 RX ORDER — ATORVASTATIN CALCIUM 80 MG/1
80 TABLET, FILM COATED ORAL EVERY EVENING
Status: DISCONTINUED | OUTPATIENT
Start: 2017-09-27 | End: 2017-09-30 | Stop reason: HOSPADM

## 2017-09-27 RX ORDER — OMEPRAZOLE 20 MG/1
20 CAPSULE, DELAYED RELEASE ORAL DAILY
Status: DISCONTINUED | OUTPATIENT
Start: 2017-09-27 | End: 2017-09-30 | Stop reason: HOSPADM

## 2017-09-27 RX ORDER — TRAMADOL HYDROCHLORIDE 50 MG/1
50 TABLET ORAL EVERY EVENING
Status: DISCONTINUED | OUTPATIENT
Start: 2017-09-27 | End: 2017-09-30 | Stop reason: HOSPADM

## 2017-09-27 RX ORDER — BISACODYL 10 MG
10 SUPPOSITORY, RECTAL RECTAL
Status: DISCONTINUED | OUTPATIENT
Start: 2017-09-27 | End: 2017-09-30 | Stop reason: HOSPADM

## 2017-09-27 RX ORDER — ACETAMINOPHEN 325 MG/1
650 TABLET ORAL EVERY 4 HOURS PRN
Status: DISCONTINUED | OUTPATIENT
Start: 2017-09-27 | End: 2017-09-30 | Stop reason: HOSPADM

## 2017-09-27 RX ORDER — SODIUM CHLORIDE 9 MG/ML
INJECTION, SOLUTION INTRAVENOUS CONTINUOUS
Status: DISCONTINUED | OUTPATIENT
Start: 2017-09-27 | End: 2017-09-28

## 2017-09-27 RX ORDER — AMOXICILLIN 250 MG
2 CAPSULE ORAL 2 TIMES DAILY
Status: DISCONTINUED | OUTPATIENT
Start: 2017-09-27 | End: 2017-09-30 | Stop reason: HOSPADM

## 2017-09-27 RX ORDER — TACROLIMUS 1 MG/1
1 CAPSULE ORAL 2 TIMES DAILY
Status: DISCONTINUED | OUTPATIENT
Start: 2017-09-27 | End: 2017-09-30 | Stop reason: HOSPADM

## 2017-09-27 RX ORDER — METOPROLOL SUCCINATE 25 MG/1
25 TABLET, EXTENDED RELEASE ORAL
Status: DISCONTINUED | OUTPATIENT
Start: 2017-09-27 | End: 2017-09-30 | Stop reason: HOSPADM

## 2017-09-27 RX ADMIN — TRAMADOL HYDROCHLORIDE 50 MG: 50 TABLET, COATED ORAL at 21:23

## 2017-09-27 RX ADMIN — METOPROLOL SUCCINATE 25 MG: 25 TABLET, EXTENDED RELEASE ORAL at 18:37

## 2017-09-27 RX ADMIN — TACROLIMUS 1 MG: 1 CAPSULE ORAL at 18:21

## 2017-09-27 RX ADMIN — MYCOPHENOLATE MOFETIL 500 MG: 250 CAPSULE ORAL at 18:22

## 2017-09-27 RX ADMIN — SODIUM CHLORIDE: 9 INJECTION, SOLUTION INTRAVENOUS at 21:38

## 2017-09-27 RX ADMIN — PREDNISONE 5 MG: 5 TABLET ORAL at 18:21

## 2017-09-27 RX ADMIN — HEPARIN SODIUM 5000 UNITS: 5000 INJECTION, SOLUTION INTRAVENOUS; SUBCUTANEOUS at 06:00

## 2017-09-27 RX ADMIN — CEFTRIAXONE 2 G: 2 INJECTION, SOLUTION INTRAVENOUS at 22:45

## 2017-09-27 RX ADMIN — ACETAMINOPHEN 650 MG: 325 TABLET, FILM COATED ORAL at 17:07

## 2017-09-27 RX ADMIN — ASPIRIN 81 MG: 81 TABLET, COATED ORAL at 08:27

## 2017-09-27 RX ADMIN — SODIUM CHLORIDE: 9 INJECTION, SOLUTION INTRAVENOUS at 13:58

## 2017-09-27 RX ADMIN — SODIUM CHLORIDE: 9 INJECTION, SOLUTION INTRAVENOUS at 06:01

## 2017-09-27 RX ADMIN — HEPARIN SODIUM 5000 UNITS: 5000 INJECTION, SOLUTION INTRAVENOUS; SUBCUTANEOUS at 14:34

## 2017-09-27 RX ADMIN — OMEPRAZOLE 20 MG: 20 CAPSULE, DELAYED RELEASE ORAL at 18:21

## 2017-09-27 RX ADMIN — AZITHROMYCIN FOR INJECTION INJECTION, POWDER, LYOPHILIZED, FOR SOLUTION 500 MG: 500 INJECTION INTRAVENOUS at 00:00

## 2017-09-27 RX ADMIN — ATORVASTATIN CALCIUM 80 MG: 80 TABLET, FILM COATED ORAL at 21:23

## 2017-09-27 RX ADMIN — HEPARIN SODIUM 5000 UNITS: 5000 INJECTION, SOLUTION INTRAVENOUS; SUBCUTANEOUS at 21:23

## 2017-09-27 ASSESSMENT — ENCOUNTER SYMPTOMS
DOUBLE VISION: 0
FLANK PAIN: 0
FEVER: 1
NECK PAIN: 0
TINGLING: 0
ORTHOPNEA: 0
PALPITATIONS: 0
DEPRESSION: 0
HEMOPTYSIS: 0
SORE THROAT: 0
TREMORS: 0
EYE DISCHARGE: 1
SPUTUM PRODUCTION: 0
BACK PAIN: 0
SHORTNESS OF BREATH: 0
NAUSEA: 0
DIZZINESS: 0
HEARTBURN: 0
BLURRED VISION: 0
DIAPHORESIS: 1
CHILLS: 0
ABDOMINAL PAIN: 0
VOMITING: 0
FEVER: 0
HALLUCINATIONS: 0
HEADACHES: 0
COUGH: 1
CONSTIPATION: 0
WEAKNESS: 0
DIARRHEA: 0
WHEEZING: 0
STRIDOR: 0
CHILLS: 1

## 2017-09-27 ASSESSMENT — PATIENT HEALTH QUESTIONNAIRE - PHQ9
SUM OF ALL RESPONSES TO PHQ9 QUESTIONS 1 AND 2: 0
1. LITTLE INTEREST OR PLEASURE IN DOING THINGS: NOT AT ALL
SUM OF ALL RESPONSES TO PHQ QUESTIONS 1-9: 0
2. FEELING DOWN, DEPRESSED, IRRITABLE, OR HOPELESS: NOT AT ALL

## 2017-09-27 ASSESSMENT — COPD QUESTIONNAIRES
COPD SCREENING SCORE: 2
HAVE YOU SMOKED AT LEAST 100 CIGARETTES IN YOUR ENTIRE LIFE: NO/DON'T KNOW
DURING THE PAST 4 WEEKS HOW MUCH DID YOU FEEL SHORT OF BREATH: NONE/LITTLE OF THE TIME
DO YOU EVER COUGH UP ANY MUCUS OR PHLEGM?: NO/ONLY WITH OCCASIONAL COLDS OR INFECTIONS

## 2017-09-27 ASSESSMENT — PAIN SCALES - GENERAL
PAINLEVEL_OUTOF10: 1
PAINLEVEL_OUTOF10: 0

## 2017-09-27 ASSESSMENT — LIFESTYLE VARIABLES
EVER_SMOKED: NEVER
DO YOU DRINK ALCOHOL: NO
EVER_SMOKED: NEVER
SUBSTANCE_ABUSE: 0

## 2017-09-27 ASSESSMENT — PAIN SCALES - WONG BAKER
WONGBAKER_NUMERICALRESPONSE: DOESN'T HURT AT ALL

## 2017-09-27 NOTE — ED NOTES
Jama Altman  61 y.o.  Chief Complaint   Patient presents with   • Sent from Urgent Care   • Abnormal Labs     Ambulatory to triage with above complaints. States that he has been having generalized body aches, chills, and fevers for about 1 week. Hx. Kidney transplant in 2010.    Sepsis Score 3 - Charge RN notified of patient.    Triage process explained to patient, apologized for wait time, and placed in Senior Lounge.

## 2017-09-27 NOTE — PROGRESS NOTES
Report received, pt care assumed. Pt denies any needs at this time. Call light and personal belongings within reach. Will continue to monitor.

## 2017-09-27 NOTE — SENIOR ADMIT NOTE
Senior Admit Note      Jama Altman 61 y.o. male with PMhx CAD s/p stent placement in 2013, s/p kidney transplant 10 years ago presented to the ED after he found have abnormal labs. Patient expressed that he went to urgent care today as he has been having flu like symptoms including dry cough and subjective fever. He underwent blood work up and his kidney functions showed abnormalities and that's why he came to the hospital. Patient reported that he takes his immunosuppressive therapy on regular basis. Patient reported positive history of sick contact. Patient follows nephrology every 3 months and Dr. León is his nephrologist. Patient had kidney transplant due to polycystic kidney disease. Her daughter also has polycystic kidney disease.    Physical Exam:    General: Not in acute distress  HEENT: NCAT  Resp: Mild crackles on right lung  CVS: Regular rate and rhythm   Abdomen: Soft non tender +BS  Neuro: No focal deficit, CN II-XII grossly intact      Vitals:    09/26/17 2230 09/26/17 2259 09/26/17 2331 09/26/17 2341   BP:       Pulse: 91 76 96    Resp:    13   Temp:       TempSrc:       SpO2: 95% 97% 97%    Weight:       Height:             DX-CHEST-PORTABLE (1 VIEW)   Final Result      1.  Hypoinflation with probable RIGHT basilar pneumonia, and minimal LEFT lung base atelectasis.   2.  Mild cardiac enlargement, increased from prior exam.   3.  No pneumothorax or pulmonary edema.           Assessment and plan:    Patient has been having subjective fever and his kidney functions are deranged from his baseline. He found to have pneumonia. CXR he was treated with Ceftriaxone and Azithromycin in ED. Blood cultures are pending. Will continue ceftriaxone. His QTc is prolonged and he is allergic to doxycyline will hold these medication at this time. Also patient has acute on chronic kidney disease will provider him IVF 100ml/hour.    At this time due to his active infection hold his immunosuppressive therapy. Day  team to decide for resume his immunosuppressive medication. Also hold his oral diabetes medication.     Code status: Full    DVT prophylaxis: Heparin SQ

## 2017-09-27 NOTE — ED PROVIDER NOTES
"ED Provider Note    Scribed for Estefany Gandhi D.O. by Eva Cruz. 9/26/2017, 10:15 PM.    Primary care provider: SHARON Jones  Means of arrival: walk in   History obtained from: patient   History limited by: none     CHIEF COMPLAINT  Chief Complaint   Patient presents with   • Sent from Urgent Care   • Abnormal Labs       HPI  Jama Altman is a 61 y.o. male with a history of polycystic kidney diease and diabetes who presents to the Emergency Department secondary to being sent from Urgent care with abnormal labs prior to arrival. The patient received a kidney transplant 10 years ago. Patient reports associated subjective fever, chills, \"watery eyes\", hip pain, dark urine, body aches onset Saturday, and cough. He reports that he has a cut on the end of his left thumb, but denies any other recent trauma, and he has been around coworkers who have been sick recently. He has taken Allieve with relief of his symptoms. Patient denies neck pain, chest pain, shortness of breath, abdominal pain, constipation, nausea, vomiting, congestion, and rashes. Additionally, patient denies smoking, drug use, or alcohol use. His nephrologist is Dr. León.     REVIEW OF SYSTEMS  Review of Systems   Constitutional: Positive for chills and fever (subjective).        Body aches   HENT: Positive for ear discharge (watery eyes). Negative for congestion.    Respiratory: Positive for cough. Negative for shortness of breath.    Cardiovascular: Negative for chest pain.   Gastrointestinal: Negative for abdominal pain, constipation, nausea and vomiting.   Genitourinary:        Dark urine   Musculoskeletal: Positive for joint pain (hip pain). Negative for neck pain.   Skin: Negative for rash.   All other systems reviewed and are negative.  C    PAST MEDICAL HISTORY   has a past medical history of Benign essential hypertension; Hyperlipoproteinemia; Hypertension; Pain; Polycystic kidney (9/10/10); Sleep apnea; and " "Snoring.    SURGICAL HISTORY   has a past surgical history that includes knee arthroplasty total (1/12/07); knee arthroscopy (4/10/06); other orthopedic surgery (7/8/74); other (9/10/10); knee arthroscopy (5/3/2011); medial meniscectomy (5/3/2011); knee unicompartmental (12/23/2011); knee arthroscopy (12/23/2011); and knee manipulation (2/16/2012).    SOCIAL HISTORY  Social History   Substance Use Topics   • Smoking status: Never Smoker   • Smokeless tobacco: Never Used   • Alcohol use No      History   Drug Use No       FAMILY HISTORY  History reviewed. No pertinent family history.    CURRENT MEDICATIONS  Home Medications     Reviewed by Emir Sage (Pharmacy Tech) on 09/26/17 at 2336  Med List Status: Complete   Medication Last Dose Status   aspirin EC (ECOTRIN) 81 MG Tablet Delayed Response 9/26/2017 Active   atorvastatin (LIPITOR) 80 MG tablet 9/26/2017 Active   glyBURIDE (DIABETA) 5 MG TABS 9/26/2017 Active   linagliptin (TRADJENTA) 5 MG TABS tablet 9/26/2017 Active   metoprolol SR (TOPROL XL) 25 MG TABLET SR 24 HR 9/26/2017 Active   mycophenolate (CELLCEPT) 250 MG Cap 9/26/2017 Active   omeprazole (PRILOSEC) 20 MG Tablet Delayed Response delayed-release tablet 9/26/2017 Active   pioglitazone (ACTOS) 45 MG Tab 9/26/2017 Active   predniSONE (DELTASONE) 5 MG TABS 9/26/2017 Active   tacrolimus (PROGRAF) 1 MG CAPS 9/26/2017 Active   tramadol (ULTRAM) 50 MG TABS 9/26/2017 Active                ALLERGIES  Allergies   Allergen Reactions   • Doxycycline Rash     Sweats and shakes       PHYSICAL EXAM  VITAL SIGNS: /57   Pulse (!) 101   Temp 35.8 °C (96.5 °F) (Temporal)   Resp 18   Ht 1.969 m (6' 5.5\")   Wt 112.4 kg (247 lb 12.8 oz)   SpO2 93%   BMI 29.01 kg/m²   Vitals reviewed.  Constitutional: Patient is oriented to person, place, and time. Appears well-developed and well-nourished. No distress.    Head: Normocephalic and atraumatic.   Ears: Normal external ears bilaterally.   Mouth/Throat: " Oropharynx is clear and moist, no exudates.   Eyes: Conjunctivae are normal. Pupils are equal, round, and reactive to light.   Neck: Normal range of motion. Neck supple.  Cardiovascular: Tachycardic, regular rhythm and normal heart sounds. Normal peripheral pulses.  Pulmonary/Chest: Effort normal. No wheezes or rhonchi. Scant rales at the bases. No chest wall tenderness.  Abdominal: Soft. Bowel sounds are normal. There is no tenderness, rebound or guarding, or peritoneal signs, no masses. No CVA tenderness.  Musculoskeletal: No edema and no tenderness.   Lymphadenopathy: No cervical adenopathy.   Neurological: No focal deficits.   Skin: Skin is warm and dry. No erythema. No pallor.   Psychiatric: Patient has a normal mood and affect.     LABS  Results for orders placed or performed during the hospital encounter of 09/26/17   Lactic acid (lactate)   Result Value Ref Range    Lactic Acid 1.2 0.5 - 2.0 mmol/L   CBC WITH DIFFERENTIAL   Result Value Ref Range    WBC 11.3 (H) 4.8 - 10.8 K/uL    RBC 4.85 4.70 - 6.10 M/uL    Hemoglobin 13.3 (L) 14.0 - 18.0 g/dL    Hematocrit 42.1 42.0 - 52.0 %    MCV 86.8 81.4 - 97.8 fL    MCH 27.4 27.0 - 33.0 pg    MCHC 31.6 (L) 33.7 - 35.3 g/dL    RDW 47.7 35.9 - 50.0 fL    Platelet Count 116 (L) 164 - 446 K/uL    MPV 11.7 9.0 - 12.9 fL    Nucleated RBC 0.00 /100 WBC    NRBC (Absolute) 0.00 K/uL    Neutrophils-Polys 84.40 (H) 44.00 - 72.00 %    Lymphocytes 0.00 (L) 22.00 - 41.00 %    Monocytes 1.70 0.00 - 13.40 %    Eosinophils 0.90 0.00 - 6.90 %    Basophils 0.00 0.00 - 1.80 %    Neutrophils (Absolute) 11.01 (H) 1.82 - 7.42 K/uL    Lymphs (Absolute) 0.00 (L) 1.00 - 4.80 K/uL    Monos (Absolute) 0.19 0.00 - 0.85 K/uL    Eos (Absolute) 0.10 0.00 - 0.51 K/uL    Baso (Absolute) 0.00 0.00 - 0.12 K/uL   COMP METABOLIC PANEL   Result Value Ref Range    Sodium 134 (L) 135 - 145 mmol/L    Potassium 4.5 3.6 - 5.5 mmol/L    Chloride 101 96 - 112 mmol/L    Co2 21 20 - 33 mmol/L    Anion Gap 12.0 (H)  0.0 - 11.9    Glucose 135 (H) 65 - 99 mg/dL    Bun 42 (H) 8 - 22 mg/dL    Creatinine 2.96 (H) 0.50 - 1.40 mg/dL    Calcium 8.6 8.5 - 10.5 mg/dL    AST(SGOT) 29 12 - 45 U/L    ALT(SGPT) 33 2 - 50 U/L    Alkaline Phosphatase 152 (H) 30 - 99 U/L    Total Bilirubin 2.0 (H) 0.1 - 1.5 mg/dL    Albumin 3.5 3.2 - 4.9 g/dL    Total Protein 6.3 6.0 - 8.2 g/dL    Globulin 2.8 1.9 - 3.5 g/dL    A-G Ratio 1.3 g/dL   ESTIMATED GFR   Result Value Ref Range    GFR If  26 (A) >60 mL/min/1.73 m 2    GFR If Non African American 22 (A) >60 mL/min/1.73 m 2   DIFFERENTIAL MANUAL   Result Value Ref Range    Bands-Stabs 13.00 (H) 0.00 - 10.00 %    Manual Diff Status PERFORMED    PERIPHERAL SMEAR REVIEW   Result Value Ref Range    Peripheral Smear Review see below    PLATELET ESTIMATE   Result Value Ref Range    Plt Estimation Decreased    MORPHOLOGY   Result Value Ref Range    RBC Morphology Present     Poikilocytosis 2+     Ovalocytes 1+     Echinocytes 1+    CBC with Differential   Result Value Ref Range    WBC 10.2 4.8 - 10.8 K/uL    RBC 4.51 (L) 4.70 - 6.10 M/uL    Hemoglobin 12.3 (L) 14.0 - 18.0 g/dL    Hematocrit 38.7 (L) 42.0 - 52.0 %    MCV 85.8 81.4 - 97.8 fL    MCH 27.3 27.0 - 33.0 pg    MCHC 31.8 (L) 33.7 - 35.3 g/dL    RDW 47.3 35.9 - 50.0 fL    Platelet Count 106 (L) 164 - 446 K/uL    MPV 11.7 9.0 - 12.9 fL    Nucleated RBC 0.00 /100 WBC    NRBC (Absolute) 0.00 K/uL    Neutrophils-Polys 88.90 (H) 44.00 - 72.00 %    Lymphocytes 2.60 (L) 22.00 - 41.00 %    Monocytes 3.40 0.00 - 13.40 %    Eosinophils 0.80 0.00 - 6.90 %    Basophils 0.80 0.00 - 1.80 %    Neutrophils (Absolute) 9.33 (H) 1.82 - 7.42 K/uL    Lymphs (Absolute) 0.27 (L) 1.00 - 4.80 K/uL    Monos (Absolute) 0.35 0.00 - 0.85 K/uL    Eos (Absolute) 0.08 0.00 - 0.51 K/uL    Baso (Absolute) 0.08 0.00 - 0.12 K/uL    Anisocytosis 1+     Microcytosis 1+    Basic Metabolic Panel (BMP)   Result Value Ref Range    Sodium 138 135 - 145 mmol/L    Potassium 4.1 3.6  - 5.5 mmol/L    Chloride 107 96 - 112 mmol/L    Co2 20 20 - 33 mmol/L    Glucose 75 65 - 99 mg/dL    Bun 46 (H) 8 - 22 mg/dL    Creatinine 2.98 (H) 0.50 - 1.40 mg/dL    Calcium 7.7 (L) 8.5 - 10.5 mg/dL    Anion Gap 11.0 0.0 - 11.9   LACTIC ACID   Result Value Ref Range    Lactic Acid 1.4 0.5 - 2.0 mmol/L   ESTIMATED GFR   Result Value Ref Range    GFR If  26 (A) >60 mL/min/1.73 m 2    GFR If Non African American 22 (A) >60 mL/min/1.73 m 2   PERIPHERAL SMEAR REVIEW   Result Value Ref Range    Peripheral Smear Review see below    MORPHOLOGY   Result Value Ref Range    RBC Morphology Present    DIFFERENTIAL MANUAL   Result Value Ref Range    Bands-Stabs 2.60 0.00 - 10.00 %    Metamyelocytes 0.90 %    Manual Diff Status PERFORMED    EKG (ER)   Result Value Ref Range    Report       [facility] Emergency Dept.    Test Date:  2017  Pt Name:    CIARA FORRESTER                   Department: ER  MRN:        [id number]                      Room:       [facility]  Gender:     M                            Technician: [id number]  :        [id number]                   Requested By:PAWEL BOOKER  Order #:    [id number]                    Reading MD: PAWEL BOOKER DO    Measurements  Intervals                                Axis  Rate:       91                           P:          15  NH:         141                          QRS:        12  QRSD:       142                          T:          -8  QT:         392  QTc:        483    Interpretive Statements  UNKNOWN RHYTHM, IRREGULAR RATE    RIGHT BUNDLE BRANCH BLOCK  Compared to ECG 2013 13:42:25  Sinus rhythm no longer present  Atrial premature complex(es) no longer present    Electronically Signed On 2017 0:55:05 PDT by PAWEL BOOKER DO       All labs reviewed by me.    EKG Interpretation    Interpreted by me    Rhythm: Sinus with PVCs  Rate: 91  Axis: normal  Ectopy: none  Conduction: RBBB  ST Segments: no acute change  T Waves: no  acute change  Q Waves: none  Comparison from previous EKG in 2013  Clinical Impression: no acute changes.     RADIOLOGY  DX-CHEST-PORTABLE (1 VIEW)   Final Result      1.  Hypoinflation with probable RIGHT basilar pneumonia, and minimal LEFT lung base atelectasis.   2.  Mild cardiac enlargement, increased from prior exam.   3.  No pneumothorax or pulmonary edema.           The radiologist's interpretation of all radiological studies have been reviewed by me.    COURSE & MEDICAL DECISION MAKING  Pertinent Labs & Imaging studies reviewed. (See chart for details)    Obtained and reviewed past medical records from 9/26 which indicated that the patient had labs drawn today, 9/26. He had a creatinine of 1.9 on 6/21/17 and today, it is 2.4. He also has an elevated WBC, and a bandemia with elevated LFT.     10:15 PM - Patient seen and examined at bedside. Patient's symptoms suggest some kind of infectious etiology. He got body aches, subjective fever, he developed a cough yesterday. He is also immune compromise. Sepsis protocol was initiated. Patient will be treated with Zithromax 500 mg and Rocephin 2g, and her Cleef for a suspected pneumonia. The patient will be resuscitated with 1L NS IV secondary to patient's apparent dehydration and tachycardia. Ordered lactic acid, oxygen therapy, cardiac monitoring, EKG, CBC, CMP, urinalysis, urine culture, blood culture, DX chest to evaluate his symptoms. The differential diagnoses include but are not limited to: dehydration, viral illness, sepsis, pneumonia, or a urinary tract infection.     I discussed with the patient, as lab findings. Compared to earlier today, his creatinine is increased from 2.4 which is over his baseline of 1.9, 22.9 now. His white count slightly improved but he does still have an elevated neutrophil count. Chest x-ray shows evidence of pneumonia. Given his immune compromised status with worsening renal function, advised him he needs admission to the hospital  for further care and he is agreeable.    11:02 PM- Paged R internal medicine to admit the patient.     11:23 PM- Spoke with [facility] internal medicine who agrees to admit the patient.         DISPOSITION:  Patient will be admitted to [facility] internal medicine in guarded condition.      FINAL IMPRESSION  1. Pneumonia of lower lobe due to infectious organism, unspecified laterality    2. Immunocompromised (CMS-HCC)    3. History of kidney transplant    4. Renal insufficiency          Eva STOCKTON (Scribe), am scribing for, and in the presence of, Estefany Gandhi D.O..    Electronically signed by: Eva Cruz (Scribe), 9/26/2017    IEstefany D.O. personally performed the services described in this documentation, as scribed by Eva Cruz in my presence, and it is both accurate and complete.    The note accurately reflects work and decisions made by me.  Estefany Gandhi  9/27/2017  5:06 AM

## 2017-09-27 NOTE — H&P
Internal Medicine Admitting History and Physical    Name Jama Altman     1955   Age/Sex 61 y.o. male   MRN [id number]   Code Status Full code     After 5PM or if no immediate response to page, please call for cross-coverage  Attending/Team: Dr. Casey/ Dewey See Patient List for primary contact information  Call [phone] to page    1st Call - Day Intern (R1):   Dr. Patricia 2nd Call - Day Sr. Resident (R2/R3):   Dr. Cardoso       Chief Complaint:  Fever,chills x 2 days    HPI:  Patient is a 61 year old with past medical history of hypertension, diabetes mellitus, coronary artery disease ( MI in 2013 s/p stenting) and polycystic kidney disease s/p kidney transplant (09/10/2010 ) on immunosuppressants who presents with complaints of fever and chills since 2 days. Patient reports having sick contacts around him. Patient also reports having a dry cough for 2 days and sweats. He denies any shortness of breath, chest pain , rhinorrhea, sore throat, nausea/ vomiting. Patient went to an urgent care clinic for the above symptoms and was sent here after he was found to have abnormal labs. Patient reports dark urine but denies any dysuria, increased frequency, hematuria, flank pain or abdominal discomfort.    Patient follows up with nephrology every 2-3 months. Dr. León is his nephrologist.      Review of Systems   Constitutional: Positive for chills, diaphoresis and fever.   HENT: Negative for sore throat.    Eyes: Positive for discharge. Negative for blurred vision and double vision.        Reported watering of bilateral eyes   Respiratory: Positive for cough. Negative for hemoptysis, sputum production, shortness of breath, wheezing and stridor.    Cardiovascular: Negative for chest pain, palpitations, orthopnea and leg swelling.   Gastrointestinal: Negative for abdominal pain, diarrhea, heartburn, nausea and vomiting.   Genitourinary: Negative for dysuria, frequency and urgency.   Musculoskeletal:  Negative for back pain, joint pain and neck pain.   Skin: Negative for itching and rash.   Neurological: Negative for dizziness, tingling, tremors, weakness and headaches.   Psychiatric/Behavioral: Negative for depression, hallucinations and substance abuse.             Past Medical History:   Past Medical History:   Diagnosis Date   • Polycystic kidney 9/10/10    RIGHT KIDNEY TRANSPLANT   • Benign essential hypertension    • Hyperlipoproteinemia    • Hypertension     not on meds anymore   • Pain    • Sleep apnea    • Snoring        Past Surgical History:  Past Surgical History:   Procedure Laterality Date   • KNEE MANIPULATION  2/16/2012    Performed by LATOYA CONNER at SURGERY [facility] ORS   • KNEE UNICOMPARTMENTAL  12/23/2011    Performed by LATOYA CONNER at SURGERY [facility] ORS   • KNEE ARTHROSCOPY  12/23/2011    Performed by LATOYA CONNER at SURGERY [facility] ORS   • KNEE ARTHROSCOPY  5/3/2011    Performed by HANANE GOLDMAN at SURGERY SAME DAY [facility] ORS   • MEDIAL MENISCECTOMY  5/3/2011    Performed by HANANE GOLDMAN at SURGERY SAME DAY [facility] ORS   • OTHER  9/10/10    RIGHT KIDNEY TRANSPLANT   • KNEE ARTHROPLASTY TOTAL  1/12/07    RIGHT   • KNEE ARTHROSCOPY  4/10/06    RIGHT   • OTHER ORTHOPEDIC SURGERY  7/8/74    LEFT KNEE DEBRIDEMENT       Current Outpatient Medications:  Home Medications     Reviewed by Emir Sage (Pharmacy Tech) on 09/26/17 at 2336  Med List Status: Complete   Medication Last Dose Status   aspirin EC (ECOTRIN) 81 MG Tablet Delayed Response 9/26/2017 Active   atorvastatin (LIPITOR) 80 MG tablet 9/26/2017 Active   glyBURIDE (DIABETA) 5 MG TABS 9/26/2017 Active   linagliptin (TRADJENTA) 5 MG TABS tablet 9/26/2017 Active   metoprolol SR (TOPROL XL) 25 MG TABLET SR 24 HR 9/26/2017 Active   mycophenolate (CELLCEPT) 250 MG Cap 9/26/2017 Active   omeprazole (PRILOSEC) 20 MG Tablet Delayed Response delayed-release tablet 9/26/2017 Active   pioglitazone (ACTOS) 45 MG Tab  "9/26/2017 Active   predniSONE (DELTASONE) 5 MG TABS 9/26/2017 Active   tacrolimus (PROGRAF) 1 MG CAPS 9/26/2017 Active   tramadol (ULTRAM) 50 MG TABS 9/26/2017 Active                Medication Allergy/Sensitivities:  Allergies   Allergen Reactions   • Doxycycline Rash     Sweats and shakes         Family History:  No kidney disease in his parents/grandparents. His daughter has polycystic kidney disease  Brother- DM      Social History:  Social History     Social History   • Marital status:      Spouse name: N/A   • Number of children: N/A   • Years of education: N/A     Occupational History   • Not on file.     Social History Main Topics   • Smoking status: Never Smoker   • Smokeless tobacco: Never Used   • Alcohol use No   • Drug use: No   • Sexual activity: Not on file     Other Topics Concern   • Not on file     Social History Narrative   • No narrative on file     Living situation: Lives with family  PCP : Nayana Alberts      Physical Exam     Vitals:    09/27/17 0132 09/27/17 0148 09/27/17 0416 09/27/17 0455   BP:  108/58 100/62    Pulse: 93 93 99    Resp: 13 17 17    Temp:  36.4 °C (97.5 °F) 36.6 °C (97.8 °F)    TempSrc:       SpO2: 94% 96% 92% 96%   Weight:  113.5 kg (250 lb 3.6 oz)     Height:  1.956 m (6' 5\")       Body mass index is 29.67 kg/m².  /62   Pulse 99   Temp 36.6 °C (97.8 °F)   Resp 17   Ht 1.956 m (6' 5\")   Wt 113.5 kg (250 lb 3.6 oz)   SpO2 96%   BMI 29.67 kg/m²   O2 therapy: Pulse Oximetry: 96 %, O2 (LPM): 2, O2 Delivery: Nasal Cannula    Physical Exam   Constitutional: He is oriented to person, place, and time and well-developed, well-nourished, and in no distress.   HENT:   Head: Normocephalic and atraumatic.   Mouth/Throat: No oropharyngeal exudate.   Eyes: EOM are normal. Pupils are equal, round, and reactive to light. Scleral icterus is present.   Neck: Normal range of motion. Neck supple.   Cardiovascular: Normal rate, regular rhythm and normal heart sounds.    No murmur " heard.  Pulmonary/Chest: Effort normal. No respiratory distress. He has no wheezes. He has rales.   Crackles heard on the left side   Abdominal: Soft. Bowel sounds are normal. He exhibits no distension. There is no tenderness.   Vertical surgical scar seen on the right   Musculoskeletal: Normal range of motion. He exhibits no edema.   Neurological: He is alert and oriented to person, place, and time.             Data Review       Old Records Request:   Deferred  Current Records review and summary: Completed    Lab Data Review:  Recent Results (from the past 24 hour(s))   POCT Urinalysis    Collection Time: 09/26/17  1:00 PM   Result Value Ref Range    POC Color priscila Negative    POC Appearance cloudy Negative    POC Leukocyte Esterase neg Negative    POC Nitrites neg Negative    POC Urobiligen neg Negative (0.2) mg/dL    POC Protein 30 Negative mg/dL    POC Urine PH 5.0 5.0 - 8.0    POC Blood trace Negative    POC Specific Gravity 1.020 <1.005 - >1.030    POC Ketones neg Negative mg/dL    POC Biliruben neg Negative mg/dL    POC Glucose neg Negative mg/dL   POCT Glucose    Collection Time: 09/26/17  1:55 PM   Result Value Ref Range    Glucose - Accu-Ck 159 (A) 70 - 100 mg/dL   POCT Influenza A/B    Collection Time: 09/26/17  3:23 PM   Result Value Ref Range    Rapid Influenza A-B Negative     Internal Control Positive Positive     Internal Control Negative Negative    COMP METABOLIC PANEL    Collection Time: 09/26/17  3:37 PM   Result Value Ref Range    Sodium 133 (L) 135 - 145 mmol/L    Potassium 4.6 3.6 - 5.5 mmol/L    Chloride 100 96 - 112 mmol/L    Co2 22 20 - 33 mmol/L    Anion Gap 11.0 0.0 - 11.9    Glucose 162 (H) 65 - 99 mg/dL    Bun 39 (H) 8 - 22 mg/dL    Creatinine 2.42 (H) 0.50 - 1.40 mg/dL    Calcium 8.8 8.5 - 10.5 mg/dL    AST(SGOT) 32 12 - 45 U/L    ALT(SGPT) 40 2 - 50 U/L    Alkaline Phosphatase 147 (H) 30 - 99 U/L    Total Bilirubin 2.1 (H) 0.1 - 1.5 mg/dL    Albumin 3.6 3.2 - 4.9 g/dL    Total  Protein 6.4 6.0 - 8.2 g/dL    Globulin 2.8 1.9 - 3.5 g/dL    A-G Ratio 1.3 g/dL   WESTERGREN SED RATE    Collection Time: 09/26/17  3:37 PM   Result Value Ref Range    Sed Rate Westergren 45 (H) 0 - 20 mm/hour   CBC WITH DIFFERENTIAL    Collection Time: 09/26/17  3:37 PM   Result Value Ref Range    WBC 12.8 (H) 4.8 - 10.8 K/uL    RBC 5.01 4.70 - 6.10 M/uL    Hemoglobin 13.8 (L) 14.0 - 18.0 g/dL    Hematocrit 44.0 42.0 - 52.0 %    MCV 87.8 81.4 - 97.8 fL    MCH 27.5 27.0 - 33.0 pg    MCHC 31.4 (L) 33.7 - 35.3 g/dL    RDW 47.8 35.9 - 50.0 fL    Platelet Count 116 (L) 164 - 446 K/uL    MPV 12.0 9.0 - 12.9 fL    Nucleated RBC 0.00 /100 WBC    NRBC (Absolute) 0.00 K/uL    Neutrophils-Polys 76.50 (H) 44.00 - 72.00 %    Lymphocytes 4.40 (L) 22.00 - 41.00 %    Monocytes 2.60 0.00 - 13.40 %    Eosinophils 0.00 0.00 - 6.90 %    Basophils 0.00 0.00 - 1.80 %    Neutrophils (Absolute) 11.90 (H) 1.82 - 7.42 K/uL    Lymphs (Absolute) 0.56 (L) 1.00 - 4.80 K/uL    Monos (Absolute) 0.33 0.00 - 0.85 K/uL    Eos (Absolute) 0.00 0.00 - 0.51 K/uL    Baso (Absolute) 0.00 0.00 - 0.12 K/uL    Anisocytosis 1+     Microcytosis 1+    ESTIMATED GFR    Collection Time: 09/26/17  3:37 PM   Result Value Ref Range    GFR If  33 (A) >60 mL/min/1.73 m 2    GFR If Non  27 (A) >60 mL/min/1.73 m 2   DIFFERENTIAL MANUAL    Collection Time: 09/26/17  3:37 PM   Result Value Ref Range    Bands-Stabs 16.50 (H) 0.00 - 10.00 %    Manual Diff Status PERFORMED    PERIPHERAL SMEAR REVIEW    Collection Time: 09/26/17  3:37 PM   Result Value Ref Range    Peripheral Smear Review see below    PLATELET ESTIMATE    Collection Time: 09/26/17  3:37 PM   Result Value Ref Range    Plt Estimation #SN    MORPHOLOGY    Collection Time: 09/26/17  3:37 PM   Result Value Ref Range    RBC Morphology Present     Giant Platelets 1+     Polychromia 1+     Poikilocytosis 1+     Ovalocytes 1+     Smudge Cells Few    Lactic acid (lactate)    Collection  Time: 09/26/17 10:18 PM   Result Value Ref Range    Lactic Acid 1.2 0.5 - 2.0 mmol/L   CBC WITH DIFFERENTIAL    Collection Time: 09/26/17 10:18 PM   Result Value Ref Range    WBC 11.3 (H) 4.8 - 10.8 K/uL    RBC 4.85 4.70 - 6.10 M/uL    Hemoglobin 13.3 (L) 14.0 - 18.0 g/dL    Hematocrit 42.1 42.0 - 52.0 %    MCV 86.8 81.4 - 97.8 fL    MCH 27.4 27.0 - 33.0 pg    MCHC 31.6 (L) 33.7 - 35.3 g/dL    RDW 47.7 35.9 - 50.0 fL    Platelet Count 116 (L) 164 - 446 K/uL    MPV 11.7 9.0 - 12.9 fL    Nucleated RBC 0.00 /100 WBC    NRBC (Absolute) 0.00 K/uL    Neutrophils-Polys 84.40 (H) 44.00 - 72.00 %    Lymphocytes 0.00 (L) 22.00 - 41.00 %    Monocytes 1.70 0.00 - 13.40 %    Eosinophils 0.90 0.00 - 6.90 %    Basophils 0.00 0.00 - 1.80 %    Neutrophils (Absolute) 11.01 (H) 1.82 - 7.42 K/uL    Lymphs (Absolute) 0.00 (L) 1.00 - 4.80 K/uL    Monos (Absolute) 0.19 0.00 - 0.85 K/uL    Eos (Absolute) 0.10 0.00 - 0.51 K/uL    Baso (Absolute) 0.00 0.00 - 0.12 K/uL   COMP METABOLIC PANEL    Collection Time: 09/26/17 10:18 PM   Result Value Ref Range    Sodium 134 (L) 135 - 145 mmol/L    Potassium 4.5 3.6 - 5.5 mmol/L    Chloride 101 96 - 112 mmol/L    Co2 21 20 - 33 mmol/L    Anion Gap 12.0 (H) 0.0 - 11.9    Glucose 135 (H) 65 - 99 mg/dL    Bun 42 (H) 8 - 22 mg/dL    Creatinine 2.96 (H) 0.50 - 1.40 mg/dL    Calcium 8.6 8.5 - 10.5 mg/dL    AST(SGOT) 29 12 - 45 U/L    ALT(SGPT) 33 2 - 50 U/L    Alkaline Phosphatase 152 (H) 30 - 99 U/L    Total Bilirubin 2.0 (H) 0.1 - 1.5 mg/dL    Albumin 3.5 3.2 - 4.9 g/dL    Total Protein 6.3 6.0 - 8.2 g/dL    Globulin 2.8 1.9 - 3.5 g/dL    A-G Ratio 1.3 g/dL   ESTIMATED GFR    Collection Time: 09/26/17 10:18 PM   Result Value Ref Range    GFR If  26 (A) >60 mL/min/1.73 m 2    GFR If Non African American 22 (A) >60 mL/min/1.73 m 2   DIFFERENTIAL MANUAL    Collection Time: 09/26/17 10:18 PM   Result Value Ref Range    Bands-Stabs 13.00 (H) 0.00 - 10.00 %    Manual Diff Status PERFORMED     PERIPHERAL SMEAR REVIEW    Collection Time: 17 10:18 PM   Result Value Ref Range    Peripheral Smear Review see below    PLATELET ESTIMATE    Collection Time: 17 10:18 PM   Result Value Ref Range    Plt Estimation Decreased    MORPHOLOGY    Collection Time: 17 10:18 PM   Result Value Ref Range    RBC Morphology Present     Poikilocytosis 2+     Ovalocytes 1+     Echinocytes 1+    EKG (ER)    Collection Time: 17 11:08 PM   Result Value Ref Range    Report       [facility]    Test Date:  2017  Pt Name:    CIARA FORRESTER                   Department: ER  MRN:        [id number]                      Room:       [facility]  Gender:     M                            Technician: [id number]  :        1955                   Requested By:PAWEL BOOKER  Order #:    [id number]                    Reading MD: PAWEL BOOKER DO    Measurements  Intervals                                Axis  Rate:       91                           P:          15  WY:         141                          QRS:        12  QRSD:       142                          T:          -8  QT:         392  QTc:        483    Interpretive Statements  UNKNOWN RHYTHM, IRREGULAR RATE    RIGHT BUNDLE BRANCH BLOCK  Compared to ECG 2013 13:42:25  Sinus rhythm no longer present  Atrial premature complex(es) no longer present    Electronically Signed On 2017 0:55:05 PDT by PAWEL BOOKER DO     CBC with Differential    Collection Time: 17  2:22 AM   Result Value Ref Range    WBC 10.2 4.8 - 10.8 K/uL    RBC 4.51 (L) 4.70 - 6.10 M/uL    Hemoglobin 12.3 (L) 14.0 - 18.0 g/dL    Hematocrit 38.7 (L) 42.0 - 52.0 %    MCV 85.8 81.4 - 97.8 fL    MCH 27.3 27.0 - 33.0 pg    MCHC 31.8 (L) 33.7 - 35.3 g/dL    RDW 47.3 35.9 - 50.0 fL    Platelet Count 106 (L) 164 - 446 K/uL    MPV 11.7 9.0 - 12.9 fL    Nucleated RBC 0.00 /100 WBC    NRBC (Absolute) 0.00 K/uL    Neutrophils-Polys 88.90 (H) 44.00 - 72.00 %     Lymphocytes 2.60 (L) 22.00 - 41.00 %    Monocytes 3.40 0.00 - 13.40 %    Eosinophils 0.80 0.00 - 6.90 %    Basophils 0.80 0.00 - 1.80 %    Neutrophils (Absolute) 9.33 (H) 1.82 - 7.42 K/uL    Lymphs (Absolute) 0.27 (L) 1.00 - 4.80 K/uL    Monos (Absolute) 0.35 0.00 - 0.85 K/uL    Eos (Absolute) 0.08 0.00 - 0.51 K/uL    Baso (Absolute) 0.08 0.00 - 0.12 K/uL    Anisocytosis 1+     Microcytosis 1+    Basic Metabolic Panel (BMP)    Collection Time: 09/27/17  2:22 AM   Result Value Ref Range    Sodium 138 135 - 145 mmol/L    Potassium 4.1 3.6 - 5.5 mmol/L    Chloride 107 96 - 112 mmol/L    Co2 20 20 - 33 mmol/L    Glucose 75 65 - 99 mg/dL    Bun 46 (H) 8 - 22 mg/dL    Creatinine 2.98 (H) 0.50 - 1.40 mg/dL    Calcium 7.7 (L) 8.5 - 10.5 mg/dL    Anion Gap 11.0 0.0 - 11.9   LACTIC ACID    Collection Time: 09/27/17  2:22 AM   Result Value Ref Range    Lactic Acid 1.4 0.5 - 2.0 mmol/L   ESTIMATED GFR    Collection Time: 09/27/17  2:22 AM   Result Value Ref Range    GFR If  26 (A) >60 mL/min/1.73 m 2    GFR If Non African American 22 (A) >60 mL/min/1.73 m 2   PERIPHERAL SMEAR REVIEW    Collection Time: 09/27/17  2:22 AM   Result Value Ref Range    Peripheral Smear Review see below    MORPHOLOGY    Collection Time: 09/27/17  2:22 AM   Result Value Ref Range    RBC Morphology Present    DIFFERENTIAL MANUAL    Collection Time: 09/27/17  2:22 AM   Result Value Ref Range    Bands-Stabs 2.60 0.00 - 10.00 %    Metamyelocytes 0.90 %    Manual Diff Status PERFORMED    ACCU-CHEK GLUCOSE    Collection Time: 09/27/17  5:56 AM   Result Value Ref Range    Glucose - Accu-Ck 41 (L) 65 - 99 mg/dL   ACCU-CHEK GLUCOSE    Collection Time: 09/27/17  6:24 AM   Result Value Ref Range    Glucose - Accu-Ck 74 65 - 99 mg/dL       Imaging/Procedures Review:    ndependant Imaging Review: Completed  DX-CHEST-PORTABLE (1 VIEW)   Final Result      1.  Hypoinflation with probable RIGHT basilar pneumonia, and minimal LEFT lung base  atelectasis.   2.  Mild cardiac enlargement, increased from prior exam.   3.  No pneumothorax or pulmonary edema.                  EKG:   EKG Independant Review: Completed  QTc:483, HR: 91, Normal Sinus Rhythm, no ST/T changes     Records reviewed and summarized in current documentation             Assessment/Plan     No new Assessment & Plan notes have been filed under this hospital service since the last note was generated.  Service: Hospital Medicine    Pneumonia  - Patient has been having subjective fever with chills and dry cough. Positive history for sick contacts. Patient is on immunosuppressants for kidney transplant.  - Neutrophil leukocytosis present. Lactic acid is normal.  - chest X ray shows opacity in the right lung base   - Patient received ceftriaxone and azithromycin in the ER  - Admit to telemetry  - Patient started on ceftriaxone. Qtc is prolonged and patient is allergic to doxycycline, will hold off on coverage for atypicals at this moment  - IVF  - F/u blood culture  - Hold off on immunosuppressive therapy. Day team to decide on resuming the medications    Acute kidney injury  - history of polycystic disease s/p kidney transplant  - He has a baseline creatinine 1.9. Increased to 2.42 today  - continue IVF @ 100ml/hr  - Avoid nephrotoxic medications  - f/u urine lytes    Elevated alkaline phosphatase  - patient has a history of polycystic kidney disease  -? Hepatic cysts  - consider abdominal ultrasound for further workup      Diabetes mellitus  - Last glucose- 75  - Will hold off on home medications  - Fingerstick QID  - continue atorvastatin    Hypertension  - BP stable  - Resumed home medication metoprolol       Anticipated Hospital stay: Observation admit        Quality Measures    Reviewed items::  EKG reviewed, Labs reviewed, Medications reviewed and Radiology images reviewed  Epps catheter::  No Epps  DVT prophylaxis pharmacological::  Heparin  Antibiotics:  Treating active  infection/contamination beyond 24 hours perioperative coverage

## 2017-09-27 NOTE — PROGRESS NOTES
Received from blue  pod, aox4, sr on monitor, steady on his feet. Denies pain or sob. Call light within reach. Needs attended. Plan of care discussed and understood.

## 2017-09-27 NOTE — TELEPHONE ENCOUNTER
I contacted the patient with the laboratory tests he had run earlier this afternoon. I felt he would be best evaluated in the emergency room with his elevated white count of 12.7 and a 16% bandemia as well as a decreasing GFR to 27 and increasing creatinine to 2.4. He agreed to go to the [facility] hospital I called and spoke the on-duty emergency physician.

## 2017-09-27 NOTE — DISCHARGE PLANNING
Pt here for PNA, pt having elevated crt.  [facility] team feels at this time he is not ready for discharge.  Will continue to monitor for any dc needs.

## 2017-09-27 NOTE — ASSESSMENT & PLAN NOTE
- Patient has been having subjective fever with chills and dry cough. Positive history for sick contacts. Patient is on immunosuppressants for kidney transplant.  - Neutrophil leukocytosis present. Lactic acid is normal.  - chest X ray shows opacity in the right lung base   - Patient received ceftriaxone and azithromycin in the ER  - Admit to telemetry  - Patient started on ceftriaxone. Qtc is prolonged and patient is allergic to doxycycline, will hold off on coverage for atypicals at this moment  - IVF  - F/u blood culture  - Hold off on immunosuppressive therapy. Day team to decide on resuming the medications

## 2017-09-27 NOTE — ED NOTES
Med rec updated and complete.  Allergies reviewed.  Pt took all AM and EVENING  Doses.  No antibiotic use in last 30 days.

## 2017-09-27 NOTE — CONSULTS
DATE OF SERVICE:  09/27/2017    REASON FOR CONSULTATION:  This is a nephrology consultation for evaluation and   management of acute renal failure on chronic kidney disease, kidney   transplant.    REQUESTING PHYSICIAN:  From [facility].    HISTORY OF PRESENT ILLNESS:  The patient is a 61-year-old gentleman with past   medical history of diabetes, hypertension, polycystic kidney disease, status   post living unrelated kidney transplant in September of 2010 on chronic   immunosuppression, baseline serum creatinine around 1.9-2.2, followed by Dr. León in our office and for CKD, presents to [facility]   with complaints of nonproductive dry cough over the past 2 days with sweats.    He was found to have pneumonia and admitted for treatment with IV antibiotics.    His serum creatinine on admission was 2.42, which went up to 2.98.  His   baseline as mentioned is around 1.9-2.2.  Hence, nephrology was called to   evaluate for acute renal failure on chronic kidney disease.  Patient has been   taking his immunosuppressive medications.  His last tacrolimus level we have   on record is 6.7.  He has been compliant with his medications and diet.  There   is no history of diarrhea, nausea or vomiting.  He does; however, admit to   taking NSAIDs/Aleve for the past couple of weeks every day for hip pain.  No   history of hematuria or difficulty in urination or pain over the kidney.    PAST MEDICAL HISTORY:  Significant for polycystic kidney disease, diabetes   mellitus, hypertension, sleep apnea, and hyperlipidemia.    PAST SURGICAL HISTORY:  Knee surgery, arthroscopy, meniscal repair and kidney   transplant.    OUTPATIENT MEDICATIONS:  Include:  1.  Aspirin.  2.  Lipitor.  3.  Glyburide.  4.  Tradjenta.  5.  Actos.  6.  Prednisone.  7.  Prograf.  8.  Tramadol.    ALLERGIES:  TO DOXYCYCLINE.    FAMILY HISTORY:  His daughter has polycystic kidney disease.  Brother has   diabetes.   Parents, no history of kidney disease.    SOCIAL HISTORY:  He is , lives with his spouse.  Never smoked.    Occasionally drinks alcohol, but not currently.  No IV drug use.    PHYSICAL EXAMINATION:  VITAL SIGNS:  On examination, vitals revealing a blood pressure of 107/71 with   a pulse of 99 and temperature of 37.2.  GENERAL:  He is lying in bed, no major hemodynamic distress.  HEENT:  Normocephalic and atraumatic.  NECK:  Supple, no JVD, no thyromegaly.  CHEST:  Clear bilaterally.  CARDIOVASCULAR:  S1, S2 heard.  ABDOMEN:  Soft, nontender, and nondistended.  Bowel sounds are present.  EXTREMITIES:  Without any evidence of cyanosis, clubbing or edema.  SKIN:  With no evidence of rash, pruritus, or wounds.  NEUROLOGIC:  Nonfocal, moving all 4 extremities.    LABORATORY DATA:  White count is 10.2, hemoglobin 12.3, and platelet count   106.  Sodium 138, potassium 4.1, chloride 107, bicarbonate 20, BUN and   creatinine 46/2.9, and calcium of 7.7.  Urinalysis was turbid with trace   leukocyte esterase, occult blood, and nitrites negative.  Urine culture is   pending.    ASSESSMENT:  1.  Acute renal failure on chronic kidney disease.  This is likely secondary   to nonsteroidal anti-inflammatory meds.  2.  Chronic kidney disease, stage III.  3.  Kidney transplant.  4.  Chronic immunosuppression.  5.  Diabetes mellitus.  6.  Hypertension.  7.  Pneumonia.  8.  Acidosis.    PLAN:  I would continue IV fluids at this time.  I advised them on not taking   anymore NSAIDs in the future.  There should be recovery of renal function over   time.  Monitor his urine output, renal dose medications as necessary.  Keep   his mean arterial pressure greater than or equal to 65.    Thank you for allowing us to care for the patient.  We will follow him closely   with you.       ____________________________________     MD CATHERINE HART / MOE    DD:  09/27/2017 09:53:29  DT:  09/27/2017 10:15:30    D#:  [id number]  Job#:   [id number]

## 2017-09-28 ENCOUNTER — APPOINTMENT (OUTPATIENT)
Dept: RADIOLOGY | Facility: MEDICAL CENTER | Age: 62
DRG: 871 | End: 2017-09-28
Attending: INTERNAL MEDICINE
Payer: COMMERCIAL

## 2017-09-28 PROBLEM — D63.8 ANEMIA OF CHRONIC DISEASE: Status: ACTIVE | Noted: 2017-09-28

## 2017-09-28 LAB
ALBUMIN SERPL BCP-MCNC: 2.7 G/DL (ref 3.2–4.9)
ALBUMIN/GLOB SERPL: 1.1 G/DL
ALP SERPL-CCNC: 186 U/L (ref 30–99)
ALT SERPL-CCNC: 28 U/L (ref 2–50)
ANION GAP SERPL CALC-SCNC: 10 MMOL/L (ref 0–11.9)
ANISOCYTOSIS BLD QL SMEAR: ABNORMAL
AST SERPL-CCNC: 24 U/L (ref 12–45)
BASOPHILS # BLD AUTO: 0 % (ref 0–1.8)
BASOPHILS # BLD: 0 K/UL (ref 0–0.12)
BILIRUB SERPL-MCNC: 0.9 MG/DL (ref 0.1–1.5)
BUN SERPL-MCNC: 44 MG/DL (ref 8–22)
BURR CELLS BLD QL SMEAR: NORMAL
CALCIUM SERPL-MCNC: 7.9 MG/DL (ref 8.5–10.5)
CHLORIDE SERPL-SCNC: 106 MMOL/L (ref 96–112)
CO2 SERPL-SCNC: 22 MMOL/L (ref 20–33)
CREAT SERPL-MCNC: 3.05 MG/DL (ref 0.5–1.4)
EKG IMPRESSION: NORMAL
EOSINOPHIL # BLD AUTO: 0.06 K/UL (ref 0–0.51)
EOSINOPHIL NFR BLD: 0.8 % (ref 0–6.9)
ERYTHROCYTE [DISTWIDTH] IN BLOOD BY AUTOMATED COUNT: 48.9 FL (ref 35.9–50)
FERRITIN SERPL-MCNC: 766.6 NG/ML (ref 22–322)
GFR SERPL CREATININE-BSD FRML MDRD: 21 ML/MIN/1.73 M 2
GLOBULIN SER CALC-MCNC: 2.4 G/DL (ref 1.9–3.5)
GLUCOSE BLD-MCNC: 143 MG/DL (ref 65–99)
GLUCOSE SERPL-MCNC: 181 MG/DL (ref 65–99)
HCT VFR BLD AUTO: 36.5 % (ref 42–52)
HGB BLD-MCNC: 11.3 G/DL (ref 14–18)
HGB RETIC QN AUTO: 25.7 PG/CELL (ref 29–35)
IMM RETICS NFR: 13.6 % (ref 9.3–17.4)
IRON SATN MFR SERPL: 8 % (ref 15–55)
IRON SERPL-MCNC: 13 UG/DL (ref 50–180)
LYMPHOCYTES # BLD AUTO: 0.13 K/UL (ref 1–4.8)
LYMPHOCYTES NFR BLD: 1.7 % (ref 22–41)
MAGNESIUM SERPL-MCNC: 1.6 MG/DL (ref 1.5–2.5)
MANUAL DIFF BLD: ABNORMAL
MCH RBC QN AUTO: 27.1 PG (ref 27–33)
MCHC RBC AUTO-ENTMCNC: 31 G/DL (ref 33.7–35.3)
MCV RBC AUTO: 87.5 FL (ref 81.4–97.8)
MICROCYTES BLD QL SMEAR: ABNORMAL
MONOCYTES # BLD AUTO: 0.07 K/UL (ref 0–0.85)
MONOCYTES NFR BLD AUTO: 0.9 % (ref 0–13.4)
MORPHOLOGY BLD-IMP: NORMAL
MYELOCYTES NFR BLD MANUAL: 0.9 %
NEUTROPHILS # BLD AUTO: 7.37 K/UL (ref 1.82–7.42)
NEUTROPHILS NFR BLD: 83.5 % (ref 44–72)
NEUTS BAND NFR BLD MANUAL: 12.2 % (ref 0–10)
NRBC # BLD AUTO: 0 K/UL
NRBC BLD AUTO-RTO: 0 /100 WBC
OVALOCYTES BLD QL SMEAR: NORMAL
PHOSPHATE SERPL-MCNC: 2.7 MG/DL (ref 2.5–4.5)
PLATELET # BLD AUTO: 130 K/UL (ref 164–446)
PLATELET BLD QL SMEAR: NORMAL
PMV BLD AUTO: 11.2 FL (ref 9–12.9)
POIKILOCYTOSIS BLD QL SMEAR: NORMAL
POTASSIUM SERPL-SCNC: 4.6 MMOL/L (ref 3.6–5.5)
PROT SERPL-MCNC: 5.1 G/DL (ref 6–8.2)
RBC # BLD AUTO: 4.17 M/UL (ref 4.7–6.1)
RBC BLD AUTO: PRESENT
RETICS # AUTO: 0.04 M/UL (ref 0.04–0.06)
RETICS/RBC NFR: 0.8 % (ref 0.8–2.1)
SODIUM SERPL-SCNC: 138 MMOL/L (ref 135–145)
SPHEROCYTES BLD QL SMEAR: NORMAL
TIBC SERPL-MCNC: 161 UG/DL (ref 250–450)
TSH SERPL DL<=0.005 MIU/L-ACNC: 1.45 UIU/ML (ref 0.3–3.7)
VIT B12 SERPL-MCNC: 487 PG/ML (ref 211–911)
WBC # BLD AUTO: 7.7 K/UL (ref 4.8–10.8)

## 2017-09-28 PROCEDURE — 85046 RETICYTE/HGB CONCENTRATE: CPT

## 2017-09-28 PROCEDURE — 700102 HCHG RX REV CODE 250 W/ 637 OVERRIDE(OP): Performed by: STUDENT IN AN ORGANIZED HEALTH CARE EDUCATION/TRAINING PROGRAM

## 2017-09-28 PROCEDURE — A9270 NON-COVERED ITEM OR SERVICE: HCPCS | Performed by: INTERNAL MEDICINE

## 2017-09-28 PROCEDURE — 84100 ASSAY OF PHOSPHORUS: CPT

## 2017-09-28 PROCEDURE — 700111 HCHG RX REV CODE 636 W/ 250 OVERRIDE (IP): Performed by: STUDENT IN AN ORGANIZED HEALTH CARE EDUCATION/TRAINING PROGRAM

## 2017-09-28 PROCEDURE — 700111 HCHG RX REV CODE 636 W/ 250 OVERRIDE (IP): Performed by: INTERNAL MEDICINE

## 2017-09-28 PROCEDURE — 83735 ASSAY OF MAGNESIUM: CPT

## 2017-09-28 PROCEDURE — 85007 BL SMEAR W/DIFF WBC COUNT: CPT

## 2017-09-28 PROCEDURE — 93005 ELECTROCARDIOGRAM TRACING: CPT | Performed by: HOSPITALIST

## 2017-09-28 PROCEDURE — 83550 IRON BINDING TEST: CPT

## 2017-09-28 PROCEDURE — 700102 HCHG RX REV CODE 250 W/ 637 OVERRIDE(OP): Performed by: INTERNAL MEDICINE

## 2017-09-28 PROCEDURE — 84443 ASSAY THYROID STIM HORMONE: CPT

## 2017-09-28 PROCEDURE — 700102 HCHG RX REV CODE 250 W/ 637 OVERRIDE(OP): Performed by: HOSPITALIST

## 2017-09-28 PROCEDURE — 82728 ASSAY OF FERRITIN: CPT

## 2017-09-28 PROCEDURE — 82607 VITAMIN B-12: CPT

## 2017-09-28 PROCEDURE — 99232 SBSQ HOSP IP/OBS MODERATE 35: CPT | Mod: GC | Performed by: INTERNAL MEDICINE

## 2017-09-28 PROCEDURE — 82962 GLUCOSE BLOOD TEST: CPT

## 2017-09-28 PROCEDURE — 76776 US EXAM K TRANSPL W/DOPPLER: CPT

## 2017-09-28 PROCEDURE — 36415 COLL VENOUS BLD VENIPUNCTURE: CPT

## 2017-09-28 PROCEDURE — 93010 ELECTROCARDIOGRAM REPORT: CPT | Performed by: INTERNAL MEDICINE

## 2017-09-28 PROCEDURE — 700105 HCHG RX REV CODE 258: Performed by: INTERNAL MEDICINE

## 2017-09-28 PROCEDURE — 700111 HCHG RX REV CODE 636 W/ 250 OVERRIDE (IP): Performed by: HOSPITALIST

## 2017-09-28 PROCEDURE — 85027 COMPLETE CBC AUTOMATED: CPT

## 2017-09-28 PROCEDURE — 83540 ASSAY OF IRON: CPT

## 2017-09-28 PROCEDURE — 770020 HCHG ROOM/CARE - TELE (206)

## 2017-09-28 PROCEDURE — 700105 HCHG RX REV CODE 258: Performed by: HOSPITALIST

## 2017-09-28 PROCEDURE — A9270 NON-COVERED ITEM OR SERVICE: HCPCS | Performed by: HOSPITALIST

## 2017-09-28 PROCEDURE — A9270 NON-COVERED ITEM OR SERVICE: HCPCS | Performed by: STUDENT IN AN ORGANIZED HEALTH CARE EDUCATION/TRAINING PROGRAM

## 2017-09-28 PROCEDURE — 80053 COMPREHEN METABOLIC PANEL: CPT

## 2017-09-28 RX ORDER — DOXYCYCLINE 100 MG/1
100 TABLET ORAL EVERY 12 HOURS
Status: DISCONTINUED | OUTPATIENT
Start: 2017-09-28 | End: 2017-09-30 | Stop reason: HOSPADM

## 2017-09-28 RX ORDER — MAGNESIUM SULFATE HEPTAHYDRATE 40 MG/ML
4 INJECTION, SOLUTION INTRAVENOUS ONCE
Status: COMPLETED | OUTPATIENT
Start: 2017-09-28 | End: 2017-09-28

## 2017-09-28 RX ORDER — SODIUM CHLORIDE 9 MG/ML
INJECTION, SOLUTION INTRAVENOUS CONTINUOUS
Status: DISCONTINUED | OUTPATIENT
Start: 2017-09-28 | End: 2017-09-28

## 2017-09-28 RX ADMIN — MYCOPHENOLATE MOFETIL 500 MG: 250 CAPSULE ORAL at 06:15

## 2017-09-28 RX ADMIN — ASPIRIN 81 MG: 81 TABLET, COATED ORAL at 10:11

## 2017-09-28 RX ADMIN — TRAMADOL HYDROCHLORIDE 50 MG: 50 TABLET, COATED ORAL at 20:46

## 2017-09-28 RX ADMIN — HEPARIN SODIUM 5000 UNITS: 5000 INJECTION, SOLUTION INTRAVENOUS; SUBCUTANEOUS at 14:21

## 2017-09-28 RX ADMIN — MYCOPHENOLATE MOFETIL 500 MG: 250 CAPSULE ORAL at 18:48

## 2017-09-28 RX ADMIN — TACROLIMUS 1 MG: 1 CAPSULE ORAL at 10:11

## 2017-09-28 RX ADMIN — DOXYCYCLINE 100 MG: 100 TABLET ORAL at 14:21

## 2017-09-28 RX ADMIN — CEFTRIAXONE 2 G: 2 INJECTION, SOLUTION INTRAVENOUS at 20:47

## 2017-09-28 RX ADMIN — SODIUM CHLORIDE: 9 INJECTION, SOLUTION INTRAVENOUS at 04:01

## 2017-09-28 RX ADMIN — SODIUM CHLORIDE: 9 INJECTION, SOLUTION INTRAVENOUS at 14:24

## 2017-09-28 RX ADMIN — METOPROLOL SUCCINATE 25 MG: 25 TABLET, EXTENDED RELEASE ORAL at 20:46

## 2017-09-28 RX ADMIN — OMEPRAZOLE 20 MG: 20 CAPSULE, DELAYED RELEASE ORAL at 10:11

## 2017-09-28 RX ADMIN — MAGNESIUM SULFATE IN WATER 4 G: 40 INJECTION, SOLUTION INTRAVENOUS at 14:21

## 2017-09-28 RX ADMIN — HEPARIN SODIUM 5000 UNITS: 5000 INJECTION, SOLUTION INTRAVENOUS; SUBCUTANEOUS at 06:15

## 2017-09-28 RX ADMIN — STANDARDIZED SENNA CONCENTRATE AND DOCUSATE SODIUM 2 TABLET: 8.6; 5 TABLET, FILM COATED ORAL at 20:46

## 2017-09-28 RX ADMIN — HEPARIN SODIUM 5000 UNITS: 5000 INJECTION, SOLUTION INTRAVENOUS; SUBCUTANEOUS at 20:46

## 2017-09-28 RX ADMIN — PREDNISONE 5 MG: 5 TABLET ORAL at 10:11

## 2017-09-28 RX ADMIN — STANDARDIZED SENNA CONCENTRATE AND DOCUSATE SODIUM 2 TABLET: 8.6; 5 TABLET, FILM COATED ORAL at 10:11

## 2017-09-28 RX ADMIN — ATORVASTATIN CALCIUM 80 MG: 80 TABLET, FILM COATED ORAL at 20:46

## 2017-09-28 RX ADMIN — TACROLIMUS 1 MG: 1 CAPSULE ORAL at 20:46

## 2017-09-28 ASSESSMENT — ENCOUNTER SYMPTOMS
GASTROINTESTINAL NEGATIVE: 1
EYES NEGATIVE: 1
MYALGIAS: 0
HEARTBURN: 0
CHILLS: 0
SPUTUM PRODUCTION: 0
NERVOUS/ANXIOUS: 0
FEVER: 0
HEADACHES: 0
VOMITING: 0
ABDOMINAL PAIN: 0
FOCAL WEAKNESS: 0
FALLS: 0
SORE THROAT: 0
DOUBLE VISION: 0
CARDIOVASCULAR NEGATIVE: 1
RESPIRATORY NEGATIVE: 1
NEUROLOGICAL NEGATIVE: 1
PSYCHIATRIC NEGATIVE: 1
PALPITATIONS: 0
NAUSEA: 0
INSOMNIA: 0
CONSTITUTIONAL NEGATIVE: 1
COUGH: 0
DIZZINESS: 0
MUSCULOSKELETAL NEGATIVE: 1
WHEEZING: 0
BLURRED VISION: 0

## 2017-09-28 ASSESSMENT — LIFESTYLE VARIABLES: DO YOU DRINK ALCOHOL: NO

## 2017-09-28 ASSESSMENT — PAIN SCALES - WONG BAKER: WONGBAKER_NUMERICALRESPONSE: DOESN'T HURT AT ALL

## 2017-09-28 ASSESSMENT — PAIN SCALES - GENERAL
PAINLEVEL_OUTOF10: 4
PAINLEVEL_OUTOF10: 0
PAINLEVEL_OUTOF10: 0

## 2017-09-28 NOTE — CARE PLAN
Problem: Communication  Goal: The ability to communicate needs accurately and effectively will improve  Outcome: PROGRESSING AS EXPECTED  Discussed POC and medications with patient. Pt verbalized understanding.       Problem: Safety  Goal: Will remain free from injury  Outcome: PROGRESSING AS EXPECTED  Bed locked and in lowest position. Treaded socks provided. Call light and belongings within reach.  Patient educated to call for assistance. Pt verbalized understanding. Hourly rounding in place.

## 2017-09-28 NOTE — PROGRESS NOTES
The patient was evaluated and examined with the resident staff.  Please refer resident note for complete information.I am actively involved in the patient's care.      Tele,continous pulse ox  IVF,FU Nephro rec  Fu cultures,abx  Monitor QTc closely  Keep K >4 and Mag >2  Avoid nephrotoxins

## 2017-09-28 NOTE — PROGRESS NOTES
2 RN Skin Check done with Cornelia NJ RN    Patient's skin and sherwin prominences all appear intact and blanching. Small scab on left shin and slight generalized lower extremity edema.

## 2017-09-28 NOTE — PROGRESS NOTES
[facility] Nephrology Progress Note, Adult, Complex               Author: Fish Wilder Date & Time created: 9/28/2017  9:22 AM     Interval History:  The patient is a 61-year-old gentleman with past   medical history of diabetes, hypertension, polycystic kidney disease, status   post living unrelated kidney transplant in September of 2010 on chronic   immunosuppression, baseline serum creatinine around 1.9-2.2, followed by Dr. León in our office and for CKD, presents to [facility]   with complaints of nonproductive dry cough over the past 2 days with sweats.    He was found to have pneumonia and admitted for treatment with IV antibiotics.    His serum creatinine on admission was 2.42, which went up to 2.98.  His   baseline as mentioned is around 1.9-2.2.  Hence, nephrology was called to   evaluate for acute renal failure on chronic kidney disease.  Patient has been   taking his immunosuppressive medications.  His last tacrolimus level we have   on record is 6.7.  He has been compliant with his medications and diet.  There   is no history of diarrhea, nausea or vomiting.  He does; however, admit to   taking NSAIDs/Aleve for the past couple of weeks every day for hip pain.  No   history of hematuria or difficulty in urination or pain over the kidney.    Daily Nephrology Summary:  9/28/17 - Scr 3. Non-oliguric. Admitted with right lower lobe pneumonia.       Review of Systems:  Review of Systems   Constitutional: Negative.    HENT: Negative.    Eyes: Negative.    Respiratory: Negative.    Cardiovascular: Negative.    Gastrointestinal: Negative.    Genitourinary: Negative.    Musculoskeletal: Negative.    Skin: Negative.    Neurological: Negative.    Psychiatric/Behavioral: Negative.        Physical Exam:  Physical Exam   Constitutional: He is oriented to person, place, and time. He appears well-developed and well-nourished.   HENT:   Head: Normocephalic and atraumatic.   Eyes: EOM are  normal. Pupils are equal, round, and reactive to light.   Neck: Normal range of motion. Neck supple.   Cardiovascular: Normal rate and regular rhythm.    Pulmonary/Chest: Effort normal.   Coarse BS right lung base   Abdominal: Soft. Bowel sounds are normal.   Musculoskeletal: Normal range of motion.   Neurological: He is alert and oriented to person, place, and time.   Skin: Skin is warm and dry.   Nursing note and vitals reviewed.      Labs:        Invalid input(s): [id number]      Recent Labs      17   0409   SODIUM  134*  138  138   POTASSIUM  4.5  4.1  4.6   CHLORIDE  101  107  106   CO2  21  20  22   BUN  42*  46*  44*   CREATININE  2.96*  2.98*  3.05*   MAGNESIUM   --    --   1.6   PHOSPHORUS   --    --   2.7   CALCIUM  8.6  7.7*  7.9*     Recent Labs      17   0409   ALTSGPT  40  33   --   28   ASTSGOT  32  29   --   24   ALKPHOSPHAT  147*  152*   --   186*   TBILIRUBIN  2.1*  2.0*   --   0.9   GLUCOSE  162*  135*  75  181*     Recent Labs      17   0409   RBC  4.85  4.51*  4.17*   HEMOGLOBIN  13.3*  12.3*  11.3*   HEMATOCRIT  42.1  38.7*  36.5*   PLATELETCT  116*  106*  130*     Recent Labs      17   0409   WBC  12.8*  11.3*  10.2  7.7   NEUTSPOLYS  76.50*  84.40*  88.90*  83.50*   LYMPHOCYTES  4.40*  0.00*  2.60*  1.70*   MONOCYTES  2.60  1.70  3.40  0.90   EOSINOPHILS  0.00  0.90  0.80  0.80   BASOPHILS  0.00  0.00  0.80  0.00   ASTSGOT  32  29   --   24   ALTSGPT  40  33   --   28   ALKPHOSPHAT  147*  152*   --   186*   TBILIRUBIN  2.1*  2.0*   --   0.9           Hemodynamics:  Temp (24hrs), Av.3 °C (99.2 °F), Min:35.9 °C (96.7 °F), Max:39.8 °C (103.6 °F)  Temperature: 36.1 °C (96.9 °F)  Pulse  Av.4  Min: 57  Max: 110   Blood Pressure: 124/77     Respiratory:    Respiration: 16, Pulse Oximetry: 95 %         RUL Breath Sounds: Clear, RML Breath Sounds: Clear, RLL Breath Sounds: Diminished, BERNARD Breath Sounds: Clear, LLL Breath Sounds: Diminished  Fluids:    Intake/Output Summary (Last 24 hours) at 09/28/17 0922  Last data filed at 09/28/17 0400   Gross per 24 hour   Intake             1110 ml   Output             1600 ml   Net             -490 ml     Weight: 110.4 kg (243 lb 6.2 oz)  GI/Nutrition:  Orders Placed This Encounter   Procedures   • Diet Order     Standing Status:   Standing     Number of Occurrences:   1     Order Specific Question:   Diet:     Answer:   Diabetic [3]     Medical Decision Making, by Problem:  Active Hospital Problems    Diagnosis   • *Sepsis (CMS-[facility]) [A41.9]   • Pneumonia [J18.9]   • SANKET (acute kidney injury) (CMS-[facility]) [N17.9]   • Hypertension [I10]   • Renal Transplant 9/10/2010 2nd to Polycystic Kidney Disease [Z94.0]         Reviewed items::  Labs reviewed, Medications reviewed and Radiology images reviewed  ASSESSMENT:  1.  Acute renal failure on chronic kidney disease.  This is likely secondary   to nonsteroidal anti-inflammatory meds/Sepsis also contributing. Scr not much worse. Will take some time to trend lower. Get Transplant kidney US.  2.  Chronic kidney disease, stage III.  3.  Kidney transplant.  4.  Chronic immunosuppression.  5.  Diabetes mellitus.  6.  Hypertension.  7.  Pneumonia. RLL  8.  Acidosis.

## 2017-09-28 NOTE — PROGRESS NOTES
Monitor Summary .16/.12/.36 SR 62-83    BBB, Triplet, Couplet, F PVC, F PAC, 9 beats of Accelerated Idioventricular, and 3 beats of Accelerated Idioventricular

## 2017-09-28 NOTE — PROGRESS NOTES
Assumed care with Cornelia ROJAS RN at 2100. Patient is AOx4 with no signs of distress. Pt. Is sinus rhythm 84 on the monitor, confirmed with Kat in the monitor room. Initial assessment completed, orders reviewed, call light within reach, and hourly rounding in place. POC addressed with patient, no additional questions at this time.

## 2017-09-28 NOTE — ASSESSMENT & PLAN NOTE
Patient admitted for right lower lobe pneumonia   Treated with doxy (interaction between tacrolimus and azithro), ceftriaxone   Pneumonia vaccine

## 2017-09-28 NOTE — PROGRESS NOTES
Pt continue to ask about home medications. Christie Patricia MD notified nephrology note is in chart. Per MD, she is waiting to speak with nephrologist about resuming home medications. Will continue to monitor.

## 2017-09-28 NOTE — PROGRESS NOTES
Received call from monitor room.  Pt experienced 9 beats of accelerated idioventricular rate.  Pt asymptomatic with /67 and HR 90.  [facility] paged and updated.  Spoke with Dr. Guerra.  No orders received.

## 2017-09-28 NOTE — PROGRESS NOTES
Pt anxious about not having his kidney medications. [place] MONIQUE. Per Christie Patricia MD, waiting for nephrologist consult.

## 2017-09-28 NOTE — ASSESSMENT & PLAN NOTE
Refer above in renal transplant section   Renal functions stable with IVF  Probably secondary to NSAID's

## 2017-09-28 NOTE — PROGRESS NOTES
Internal Medicine Interval Note    Name Jama Altman     1955   Age/Sex 61 y.o. male   MRN [id number]   Code Status Full code      After 5PM or if no immediate response to page, please call for cross-coverage  Attending/Team: Dr Casey  See Patient List for primary contact information  Call [phone] to page    1st Call - Day Intern (R1):   Dr Patricia  2nd Call - Day Sr. Resident (R2/R3):   Dr Yuen      61 yrs old male admitted for sepsis secondary to right lower lobe pneumonia.     Reason for interval visit  (Principal Problem)   Sepsis (CMS-[facility])   Acute kidney injury on chronic kidney disease   S/p kidney transplant in      Interval Problem Daily Status Update  (24 hours)   Renal functions stable. USG renal normal.   Sepsis resolved, continued on antibiotics.     Review of Systems   Constitutional: Negative for chills, fever and malaise/fatigue.   HENT: Negative for congestion and sore throat.    Eyes: Negative for blurred vision and double vision.   Respiratory: Negative for cough, sputum production and wheezing.    Cardiovascular: Negative for chest pain and palpitations.   Gastrointestinal: Negative for abdominal pain, heartburn, nausea and vomiting.   Genitourinary: Negative for dysuria and urgency.   Musculoskeletal: Negative for falls and myalgias.   Skin: Negative for rash.   Neurological: Negative for dizziness, focal weakness and headaches.   Psychiatric/Behavioral: The patient is not nervous/anxious and does not have insomnia.        Consultants/Specialty  Nephrology     Disposition  In patient for right lower lobe pneumonia     Quality Measures    Reviewed items::  EKG reviewed, Labs reviewed, Medications reviewed and Radiology images reviewed  Epps catheter::  No Epps  DVT prophylaxis pharmacological::  Heparin  Ulcer Prophylaxis::  Yes  Antibiotics:  Treating active infection/contamination beyond 24 hours perioperative coverage        Physical Exam       Vitals:     09/27/17 2348 09/28/17 0131 09/28/17 0400 09/28/17 0745   BP: 121/67 109/67 103/63 124/77   Pulse: 61 90  (!) 57   Resp: 20 20 14 16   Temp: 36.3 °C (97.3 °F) 36.8 °C (98.2 °F) 36.7 °C (98 °F) 36.1 °C (96.9 °F)   TempSrc:       SpO2: 90% 96% 92% 95%   Weight:       Height:         Body mass index is 28.86 kg/m². Weight: 110.4 kg (243 lb 6.2 oz)  Oxygen Therapy:  Pulse Oximetry: 95 %, O2 (LPM): 0, O2 Delivery: None (Room Air)    Physical Exam   Constitutional: He is oriented to person, place, and time and well-developed, well-nourished, and in no distress. No distress.   HENT:   Head: Normocephalic and atraumatic.   Mouth/Throat: No oropharyngeal exudate.   Eyes: Pupils are equal, round, and reactive to light.   Neck: Normal range of motion. Neck supple. No JVD present.   Cardiovascular: Normal rate, regular rhythm, normal heart sounds and intact distal pulses.    No murmur heard.  Pulmonary/Chest: Effort normal. No respiratory distress. He has no wheezes. He has no rales.   Crackles over right lower lung regions    Abdominal: Soft. Bowel sounds are normal. He exhibits no distension. There is no tenderness.   Musculoskeletal: Normal range of motion. He exhibits no edema or deformity.   Neurological: He is alert and oriented to person, place, and time.   Skin: Skin is warm. He is not diaphoretic.   Psychiatric: Affect normal.       Lab Data Review:   9/28/2017  9:08 AM    Recent Labs      09/26/17 2218 09/27/17 0222 09/28/17   0409   SODIUM  134*  138  138   POTASSIUM  4.5  4.1  4.6   CHLORIDE  101  107  106   CO2  21  20  22   BUN  42*  46*  44*   CREATININE  2.96*  2.98*  3.05*   MAGNESIUM   --    --   1.6   PHOSPHORUS   --    --   2.7   CALCIUM  8.6  7.7*  7.9*       Recent Labs      09/26/17   1537  09/26/17 2218 09/27/17 0222 09/28/17   0409   ALTSGPT  40  33   --   28   ASTSGOT  32  29   --   24   ALKPHOSPHAT  147*  152*   --   186*   TBILIRUBIN  2.1*  2.0*   --   0.9   GLUCOSE  162*  135*  75  181*        Recent Labs      09/26/17 2218 09/27/17 0222  09/28/17   0409   RBC  4.85  4.51*  4.17*   HEMOGLOBIN  13.3*  12.3*  11.3*   HEMATOCRIT  42.1  38.7*  36.5*   PLATELETCT  116*  106*  130*       Recent Labs      09/26/17   1537  09/26/17 2218  09/27/17 0222  09/28/17   0409   WBC  12.8*  11.3*  10.2  7.7   NEUTSPOLYS  76.50*  84.40*  88.90*  83.50*   LYMPHOCYTES  4.40*  0.00*  2.60*  1.70*   MONOCYTES  2.60  1.70  3.40  0.90   EOSINOPHILS  0.00  0.90  0.80  0.80   BASOPHILS  0.00  0.00  0.80  0.00   ASTSGOT  32  29   --   24   ALTSGPT  40  33   --   28   ALKPHOSPHAT  147*  152*   --   186*   TBILIRUBIN  2.1*  2.0*   --   0.9           Assessment/Plan     * Sepsis (CMS-HCC)- (present on admission)   Assessment & Plan    Patient presented with sepsis secondary to pneumonia   Sepsis resolved   \continue antibiotics         CAD (coronary artery disease)- (present on admission)   Assessment & Plan    Home medication aspirin, statin, metoprolol   Asymptomatic         Anemia of chronic disease- (present on admission)   Assessment & Plan    Ferritin at 700 (acute phase reactant), low serum iron  Needs outpatient colonoscopy as recommended for age           SANKET (acute kidney injury) (CMS-HCC)- (present on admission)   Assessment & Plan    Refer above in renal transplant section   Renal functions stable with IVF  Probably secondary to NSAID's         Pneumonia   Assessment & Plan    Patient admitted for right lower lobe pneumonia   Treated with doxy (interaction between tacrolimus and azithro), ceftriaxone   Pneumonia vaccine           Renal Transplant 9/10/2010 2nd to Polycystic Kidney Disease- (present on admission)   Assessment & Plan    Patient is s/p renal transplant 2011, on immunosuppressants mycophenolate, tacrolimus, prednisone 5 mg   Nephrology on board, appreciate recommendations   Renal USG normal, renal functions stable         Hypertension- (present on admission)   Assessment & Plan    Continue  metoprolol home dose

## 2017-09-28 NOTE — PROGRESS NOTES
Internal Medicine Interval Note    Name Jama Altman     1955   Age/Sex 61 y.o. male   MRN [id number]   Code Status FULL CODE     After 5PM or if no immediate response to page, please call for cross-coverage  Attending/Team: Dr. Casey/ Dewey See Patient List for primary contact information  Call [phone] to page    1st Call - Day Intern (R1):   Dr. Patricia 2nd Call - Day Sr. Resident (R2/R3):   Dr. Yuen         Reason for interval visit  (Principal Problem)   Pneumonia    Interval Problem Daily Status Update  (24 hours)   Admitted overnight for pneumonia, SANKET in the setting of 2010 renal transplant.  Pt with dry cough, diaphoresis. Denies urinary symptoms.  Nephrologist is Dr. León at [facility].    Review of Systems   Constitutional: Positive for diaphoresis. Negative for chills and fever.   Respiratory: Positive for cough. Negative for hemoptysis, sputum production, shortness of breath and wheezing.    Gastrointestinal: Negative for abdominal pain, constipation, diarrhea, nausea and vomiting.   Genitourinary: Negative for dysuria, flank pain, frequency and urgency.   All other systems reviewed and are negative.      Consultants/Specialty  Nephrology    Disposition  Inpatient for treatment of sepsis, SANKET    Quality Measures    Reviewed items::  EKG reviewed, Labs reviewed, Medications reviewed and Radiology images reviewed  Epps catheter::  No Epps  DVT prophylaxis pharmacological::  Heparin  DVT prophylaxis - mechanical:  SCDs  Ulcer Prophylaxis::  Not indicated  Antibiotics:  Treating active infection/contamination beyond 24 hours perioperative coverage          Physical Exam       Vitals:    17 1645 17 1651 17 1752 17 1839   BP: 145/100      Pulse: (!) 108 (!) 110 (!) 105 99   Resp: 20 20 20 20   Temp: (!) 39.8 °C (103.6 °F) (!) 38.7 °C (101.7 °F) 38 °C (100.4 °F) 37.8 °C (100.1 °F)   TempSrc:       SpO2: 93%      Weight:       Height:          Body mass index is 29.67 kg/m². Weight: 113.5 kg (250 lb 3.6 oz)  Oxygen Therapy:  Pulse Oximetry: 93 %, O2 (LPM): 0, O2 Delivery: None (Room Air)    Physical Exam   Constitutional: He is oriented to person, place, and time and well-developed, well-nourished, and in no distress.   HENT:   Head: Normocephalic and atraumatic.   Eyes: Conjunctivae are normal. Pupils are equal, round, and reactive to light.   Neck: Normal range of motion. Neck supple.   Cardiovascular: Normal rate, normal heart sounds and intact distal pulses.  An irregular rhythm present.   Pulmonary/Chest: Effort normal and breath sounds normal.   Abdominal: Soft. Bowel sounds are normal.   Musculoskeletal: Normal range of motion.   Neurological: He is alert and oriented to person, place, and time. No cranial nerve deficit.   Skin: Skin is warm and dry.   Psychiatric: Mood and affect normal.         Lab Data Review:       Recent Labs      09/26/17 1537 09/26/17 2218 09/27/17 0222   SODIUM  133*  134*  138   POTASSIUM  4.6  4.5  4.1   CHLORIDE  100  101  107   CO2  22  21  20   BUN  39*  42*  46*   CREATININE  2.42*  2.96*  2.98*   CALCIUM  8.8  8.6  7.7*       Recent Labs      09/26/17 1537 09/26/17 2218 09/27/17 0222   ALTSGPT  40  33   --    ASTSGOT  32  29   --    ALKPHOSPHAT  147*  152*   --    TBILIRUBIN  2.1*  2.0*   --    GLUCOSE  162*  135*  75       Recent Labs      09/26/17 1537 09/26/17 2218 09/27/17 0222   RBC  5.01  4.85  4.51*   HEMOGLOBIN  13.8*  13.3*  12.3*   HEMATOCRIT  44.0  42.1  38.7*   PLATELETCT  116*  116*  106*       Recent Labs      09/26/17 1537 09/26/17 2218 09/27/17 0222   WBC  12.8*  11.3*  10.2   NEUTSPOLYS  76.50*  84.40*  88.90*   LYMPHOCYTES  4.40*  0.00*  2.60*   MONOCYTES  2.60  1.70  3.40   EOSINOPHILS  0.00  0.90  0.80   BASOPHILS  0.00  0.00  0.80   ASTSGOT  32  29   --    ALTSGPT  40  33   --    ALKPHOSPHAT  147*  152*   --    TBILIRUBIN  2.1*  2.0*   --             Assessment/Plan     * Sepsis (CMS-HCC)- (present on admission)   Assessment & Plan    This is sepsis (without associated acute organ dysfunction). SANKET more likely due to NSAID use, given presence of hyaline casts and normal lactate. Lactate 1.2, Procalcitonin 1.08  SIRS: P 101, WBC 12.8 on admit.  Source: Pneumonia  - f/u blood cultures  - continue Azithromycin, Ceftriaxone  - IVF  - monitor        SANKET (acute kidney injury) (CMS-HCC)- (present on admission)   Assessment & Plan    Cr elevated to 2.98. Denies history of any renal problems after transplant. Hyaline casts indicative of dehydration.  - Consult [facility], appreciate recs  - restart home post-transplant medications  - avoid nephrotoxic medications  - encourage hydration  - monitor        Pneumonia   Assessment & Plan    - see Sepsis plan        Hypertension- (present on admission)   Assessment & Plan    Normotensive.  - continue home medications

## 2017-09-29 VITALS
WEIGHT: 251.54 LBS | BODY MASS INDEX: 29.7 KG/M2 | TEMPERATURE: 97.3 F | OXYGEN SATURATION: 93 % | SYSTOLIC BLOOD PRESSURE: 143 MMHG | DIASTOLIC BLOOD PRESSURE: 74 MMHG | HEART RATE: 66 BPM | HEIGHT: 77 IN | RESPIRATION RATE: 112 BRPM

## 2017-09-29 LAB
ALBUMIN SERPL BCP-MCNC: 2.7 G/DL (ref 3.2–4.9)
ALBUMIN/GLOB SERPL: 1.1 G/DL
ALP SERPL-CCNC: 189 U/L (ref 30–99)
ALT SERPL-CCNC: 26 U/L (ref 2–50)
ANION GAP SERPL CALC-SCNC: 8 MMOL/L (ref 0–11.9)
AST SERPL-CCNC: 24 U/L (ref 12–45)
BACTERIA UR CULT: NORMAL
BASOPHILS # BLD AUTO: 0.7 % (ref 0–1.8)
BASOPHILS # BLD: 0.04 K/UL (ref 0–0.12)
BILIRUB SERPL-MCNC: 0.5 MG/DL (ref 0.1–1.5)
BUN SERPL-MCNC: 38 MG/DL (ref 8–22)
CALCIUM SERPL-MCNC: 8.6 MG/DL (ref 8.5–10.5)
CHLORIDE SERPL-SCNC: 107 MMOL/L (ref 96–112)
CO2 SERPL-SCNC: 22 MMOL/L (ref 20–33)
CREAT SERPL-MCNC: 2.26 MG/DL (ref 0.5–1.4)
EOSINOPHIL # BLD AUTO: 0.13 K/UL (ref 0–0.51)
EOSINOPHIL NFR BLD: 2.1 % (ref 0–6.9)
ERYTHROCYTE [DISTWIDTH] IN BLOOD BY AUTOMATED COUNT: 48.1 FL (ref 35.9–50)
GFR SERPL CREATININE-BSD FRML MDRD: 30 ML/MIN/1.73 M 2
GLOBULIN SER CALC-MCNC: 2.5 G/DL (ref 1.9–3.5)
GLUCOSE BLD-MCNC: 122 MG/DL (ref 65–99)
GLUCOSE BLD-MCNC: 199 MG/DL (ref 65–99)
GLUCOSE SERPL-MCNC: 163 MG/DL (ref 65–99)
HCT VFR BLD AUTO: 36.4 % (ref 42–52)
HGB BLD-MCNC: 11.5 G/DL (ref 14–18)
IMM GRANULOCYTES # BLD AUTO: 0.29 K/UL (ref 0–0.11)
IMM GRANULOCYTES NFR BLD AUTO: 4.7 % (ref 0–0.9)
LYMPHOCYTES # BLD AUTO: 0.4 K/UL (ref 1–4.8)
LYMPHOCYTES NFR BLD: 6.5 % (ref 22–41)
MAGNESIUM SERPL-MCNC: 2.3 MG/DL (ref 1.5–2.5)
MCH RBC QN AUTO: 27.4 PG (ref 27–33)
MCHC RBC AUTO-ENTMCNC: 31.6 G/DL (ref 33.7–35.3)
MCV RBC AUTO: 86.9 FL (ref 81.4–97.8)
MONOCYTES # BLD AUTO: 0.83 K/UL (ref 0–0.85)
MONOCYTES NFR BLD AUTO: 13.5 % (ref 0–13.4)
NEUTROPHILS # BLD AUTO: 4.45 K/UL (ref 1.82–7.42)
NEUTROPHILS NFR BLD: 72.5 % (ref 44–72)
NRBC # BLD AUTO: 0 K/UL
NRBC BLD AUTO-RTO: 0 /100 WBC
PHOSPHATE SERPL-MCNC: 2.8 MG/DL (ref 2.5–4.5)
PLATELET # BLD AUTO: 152 K/UL (ref 164–446)
PMV BLD AUTO: 10.7 FL (ref 9–12.9)
POTASSIUM SERPL-SCNC: 4.5 MMOL/L (ref 3.6–5.5)
PROT SERPL-MCNC: 5.2 G/DL (ref 6–8.2)
RBC # BLD AUTO: 4.19 M/UL (ref 4.7–6.1)
SIGNIFICANT IND 70042: NORMAL
SITE SITE: NORMAL
SODIUM SERPL-SCNC: 137 MMOL/L (ref 135–145)
SOURCE SOURCE: NORMAL
TACROLIMUS BLD-MCNC: 6.7 NG/ML
WBC # BLD AUTO: 6.1 K/UL (ref 4.8–10.8)

## 2017-09-29 PROCEDURE — 36415 COLL VENOUS BLD VENIPUNCTURE: CPT

## 2017-09-29 PROCEDURE — 82962 GLUCOSE BLOOD TEST: CPT

## 2017-09-29 PROCEDURE — A9270 NON-COVERED ITEM OR SERVICE: HCPCS | Performed by: HOSPITALIST

## 2017-09-29 PROCEDURE — 83735 ASSAY OF MAGNESIUM: CPT

## 2017-09-29 PROCEDURE — 700102 HCHG RX REV CODE 250 W/ 637 OVERRIDE(OP): Performed by: STUDENT IN AN ORGANIZED HEALTH CARE EDUCATION/TRAINING PROGRAM

## 2017-09-29 PROCEDURE — 80053 COMPREHEN METABOLIC PANEL: CPT

## 2017-09-29 PROCEDURE — 90686 IIV4 VACC NO PRSV 0.5 ML IM: CPT | Performed by: INTERNAL MEDICINE

## 2017-09-29 PROCEDURE — 700111 HCHG RX REV CODE 636 W/ 250 OVERRIDE (IP): Performed by: INTERNAL MEDICINE

## 2017-09-29 PROCEDURE — 700102 HCHG RX REV CODE 250 W/ 637 OVERRIDE(OP): Performed by: INTERNAL MEDICINE

## 2017-09-29 PROCEDURE — 3E0234Z INTRODUCTION OF SERUM, TOXOID AND VACCINE INTO MUSCLE, PERCUTANEOUS APPROACH: ICD-10-PCS | Performed by: INTERNAL MEDICINE

## 2017-09-29 PROCEDURE — 99238 HOSP IP/OBS DSCHRG MGMT 30/<: CPT | Mod: GC | Performed by: INTERNAL MEDICINE

## 2017-09-29 PROCEDURE — 700111 HCHG RX REV CODE 636 W/ 250 OVERRIDE (IP): Performed by: STUDENT IN AN ORGANIZED HEALTH CARE EDUCATION/TRAINING PROGRAM

## 2017-09-29 PROCEDURE — 84100 ASSAY OF PHOSPHORUS: CPT

## 2017-09-29 PROCEDURE — A9270 NON-COVERED ITEM OR SERVICE: HCPCS | Performed by: INTERNAL MEDICINE

## 2017-09-29 PROCEDURE — 770006 HCHG ROOM/CARE - MED/SURG/GYN SEMI*

## 2017-09-29 PROCEDURE — 90471 IMMUNIZATION ADMIN: CPT

## 2017-09-29 PROCEDURE — A9270 NON-COVERED ITEM OR SERVICE: HCPCS | Performed by: STUDENT IN AN ORGANIZED HEALTH CARE EDUCATION/TRAINING PROGRAM

## 2017-09-29 PROCEDURE — 700102 HCHG RX REV CODE 250 W/ 637 OVERRIDE(OP): Performed by: HOSPITALIST

## 2017-09-29 PROCEDURE — 85025 COMPLETE CBC W/AUTO DIFF WBC: CPT

## 2017-09-29 PROCEDURE — 90670 PCV13 VACCINE IM: CPT | Performed by: INTERNAL MEDICINE

## 2017-09-29 RX ORDER — DOXYCYCLINE 100 MG/1
100 TABLET ORAL EVERY 12 HOURS
Qty: 6 TAB | Refills: 0 | Status: SHIPPED | OUTPATIENT
Start: 2017-09-29 | End: 2017-10-02

## 2017-09-29 RX ORDER — AMOXICILLIN AND CLAVULANATE POTASSIUM 562.5; 437.5; 62.5 MG/1; MG/1; MG/1
1 TABLET, MULTILAYER, EXTENDED RELEASE ORAL 2 TIMES DAILY
Qty: 6 TAB | Refills: 0 | Status: SHIPPED | OUTPATIENT
Start: 2017-09-29 | End: 2017-10-02

## 2017-09-29 RX ADMIN — ASPIRIN 81 MG: 81 TABLET, COATED ORAL at 08:42

## 2017-09-29 RX ADMIN — OMEPRAZOLE 20 MG: 20 CAPSULE, DELAYED RELEASE ORAL at 08:41

## 2017-09-29 RX ADMIN — HEPARIN SODIUM 5000 UNITS: 5000 INJECTION, SOLUTION INTRAVENOUS; SUBCUTANEOUS at 06:04

## 2017-09-29 RX ADMIN — INFLUENZA A VIRUS A/MICHIGAN/45/2015 X-275 (H1N1) ANTIGEN (FORMALDEHYDE INACTIVATED), INFLUENZA A VIRUS A/HONG KONG/4801/2014 X-263B (H3N2) ANTIGEN (FORMALDEHYDE INACTIVATED), INFLUENZA B VIRUS B/PHUKET/3073/2013 ANTIGEN (FORMALDEHYDE INACTIVATED), AND INFLUENZA B VIRUS B/BRISBANE/60/2008 ANTIGEN (FORMALDEHYDE INACTIVATED) 0.5 ML: 15; 15; 15; 15 INJECTION, SUSPENSION INTRAMUSCULAR at 15:36

## 2017-09-29 RX ADMIN — MYCOPHENOLATE MOFETIL 500 MG: 250 CAPSULE ORAL at 06:04

## 2017-09-29 RX ADMIN — PREDNISONE 5 MG: 5 TABLET ORAL at 08:42

## 2017-09-29 RX ADMIN — TACROLIMUS 1 MG: 1 CAPSULE ORAL at 08:42

## 2017-09-29 RX ADMIN — STANDARDIZED SENNA CONCENTRATE AND DOCUSATE SODIUM 2 TABLET: 8.6; 5 TABLET, FILM COATED ORAL at 08:42

## 2017-09-29 RX ADMIN — PNEUMOCOCCAL 13-VALENT CONJUGATE VACCINE 0.5 ML: 2.2; 2.2; 2.2; 2.2; 2.2; 4.4; 2.2; 2.2; 2.2; 2.2; 2.2; 2.2; 2.2 INJECTION, SUSPENSION INTRAMUSCULAR at 15:55

## 2017-09-29 RX ADMIN — DOXYCYCLINE 100 MG: 100 TABLET ORAL at 08:42

## 2017-09-29 ASSESSMENT — ENCOUNTER SYMPTOMS
MUSCULOSKELETAL NEGATIVE: 1
GASTROINTESTINAL NEGATIVE: 1
EYES NEGATIVE: 1
PSYCHIATRIC NEGATIVE: 1
RESPIRATORY NEGATIVE: 1
CARDIOVASCULAR NEGATIVE: 1
NEUROLOGICAL NEGATIVE: 1
CONSTITUTIONAL NEGATIVE: 1

## 2017-09-29 ASSESSMENT — PAIN SCALES - GENERAL: PAINLEVEL_OUTOF10: 4

## 2017-09-29 ASSESSMENT — LIFESTYLE VARIABLES: EVER_SMOKED: NEVER

## 2017-09-29 NOTE — DISCHARGE INSTRUCTIONS
Discharge Instructions    Discharged to home by car with relative. Discharged via walking, hospital escort: Refused.  Special equipment needed: Not Applicable    Be sure to schedule a follow-up appointment with your primary care doctor or any specialists as instructed.     Discharge Plan:   Diet Plan: Discussed  Activity Level: Discussed  Confirmed Follow up Appointment: Patient to Call and Schedule Appointment  Confirmed Symptoms Management: Discussed  Medication Reconciliation Updated: Yes  Influenza Vaccine Indication: Indicated: 9 to 64 years of age    I understand that a diet low in cholesterol, fat, and sodium is recommended for good health. Unless I have been given specific instructions below for another diet, I accept this instruction as my diet prescription.   Other diet: x    Special Instructions: None    · Is patient discharged on Warfarin / Coumadin?   No     · Is patient Post Blood Transfusion?  No      Pneumonia, Adult  Pneumonia is an infection of the lungs.   CAUSES  Pneumonia may be caused by bacteria or a virus. Usually, the infection is caused by breathing in droplets from an infected person's cough or sneeze.   SYMPTOMS   Symptoms of pneumonia include:  · Cough.  · Fever.  · Chest pain.  · Rapid breathing.  · Shortness of breath.  · Shaking chills.  · Mucus production.  DIAGNOSIS   If you have the common symptoms of pneumonia, often your health care provider will confirm the diagnosis with a chest X-ray. The X-ray will show an abnormality in the lung if you have pneumonia. Other tests may be done on your blood, urine, or mucus (sputum) to find the specific cause of your pneumonia. A blood gas test or pulse oximetry test may be needed to check how well your lungs are working.  TREATMENT   Your treatment will depend on whether your pneumonia is caused by bacteria or a virus.   · Bacterial pneumonia is treated with antibiotic medicine.  · Pneumonia that is caused by the influenza virus may be  treated with an antiviral medicine.  · Pneumonia that is caused by a virus other than influenza will not respond to antibiotic medicine. This type of pneumonia will have to run its course.   HOME CARE INSTRUCTIONS   · Cough suppressants may be used if you are losing too much rest from coughing at night. However, you should try to avoid taking cough suppresants. This is because coughing helps to remove mucus from your lungs.  · Sleep in a semi-upright position at night. Try sleeping in a reclining chair, or place a few pillows under your head.  · Try using a cold steam vaporizer or humidifier in your home or bedroom. This may help loosen your mucus.  · If you were prescribed an antibiotic medicine, finish all of it even if you start to feel better.  · If you were prescribed an expectorant, take it as directed by your health care provider. This medicine loosens the mucus so you can cough it up.  · Take medicines only as directed by your health care provider.  · Do not smoke. If you are a smoker and continue to smoke, your cough may last several weeks after your pneumonia has cleared.  · Get rest when you feel tired, or as needed.  PREVENTION  A pneumococcal shot (vaccine) is available to prevent a common bacterial cause of pneumonia. This is usually suggested for:  · People over 65 years old.  · People on chemotherapy.  · People with chronic lung problems, such as bronchitis or emphysema.  · People with immune system problems.  If you are over 65 years old or have a high risk condition, you may receive the pneumococcal vaccine if you have not received it before. In some countries, a routine influenza vaccine is also recommended. This vaccine can help prevent some cases of pneumonia. You may be offered the influenza vaccine as part of your care.  If you are a smoker, it is time to quit in order to prevent pneumonia in the future. You may receive instructions on how to stop smoking. Your health care provider can provide  medicines and counseling to help you quit.  SEEK MEDICAL CARE IF:  · You have a fever.  · You cannot control your cough with suppressants at night, and you keep losing sleep.  SEEK IMMEDIATE MEDICAL CARE IF:   · You have worsening shortness of breath.  · You have increased chest pain.  · Your sickness becomes worse, especially if you are an older adult or have a weakened immune system.  · You cough up blood.  · You have pain that is getting worse or is not controlled with medicines.  · Your symptoms are getting worse rather than better.     This information is not intended to replace advice given to you by your health care provider. Make sure you discuss any questions you have with your health care provider.     Document Released: 12/18/2006 Document Revised: 01/08/2016 Document Reviewed: 04/13/2016  [employer] Interactive Patient Education ©2016 [employer]      Antibiotic Medication  Antibiotics are among the most frequently prescribed medicines. Antibiotics cure illness by assisting our body to injure or kill the bacteria that cause infection. While antibiotics are useful to treat a wide variety of infections they are useless against viruses. Antibiotics cannot cure colds, flu, or other viral infections.   There are many types of antibiotics available. Your caregiver will decide which antibiotic will be useful for an illness. Never take or give someone else's antibiotics or left over medicine.  Your caregiver may also take into account:  · Allergies.  · The cost of the medicine.  · Dosing schedules.  · Taste.  · Common side effects when choosing an antibiotic for an infection.  Ask your caregiver if you have questions about why a certain medicine was chosen.  HOME CARE INSTRUCTIONS  Read all instructions and labels on medicine bottles carefully. Some antibiotics should be taken on an empty stomach while others should be taken with food. Taking antibiotics incorrectly may reduce how well they work. Some  antibiotics need to be kept in the refrigerator. Others should be kept at room temperature. Ask your caregiver or pharmacist if you do not understand how to give the medicine.  Be sure to give the amount of medicine your caregiver has prescribed. Even if you feel better and your symptoms improve, bacteria may still remain alive in the body. Taking all of the medicine will prevent:  · The infection from returning and becoming harder to treat.  · Complications from partially treated infections.  If there is any medicine left over after you have taken the medicine as your caregiver has instructed, throw the medicine away.  Be sure to tell your caregiver if you:  · Are allergic to any medicines.  · Are pregnant or intend to become pregnant while using this medicine.  · Are breastfeeding.  · Are taking any other prescription, non-prescription medicine, or herbal remedies.  · Have any other medical conditions or problems you have not already discussed.  If you are taking birth control pills, they may not work while you are on antibiotics. To avoid unwanted pregnancy:  · Continue taking your birth control pills as usual.  · Use a second form of birth control (such as condoms) while you are taking antibiotic medicine.  · When you finish taking the antibiotic medicine, continue using the second form of birth control until you are finished with your current 1 month cycle of birth control pills.  Try not to miss any doses of medicine. If you miss a dose, take it as soon as possible. However, if it is almost time for the next dose and the dosing schedule is:  · 2 doses a day, take the missed dose and the next dose 5 to 6 hours apart.  · 3 or more doses a day, take the missed dose and the next dose 2 to 4 hours apart, then go back to the normal schedule.  · If you are unable to make up a missed dose, take the next scheduled dose on time and complete the missed dose at the end of the prescribed time for your medicine.  SIDE  EFFECTS TO TAKING ANTIBIOTICS  Common side effects to antibiotic use include:  · Soft stools or diarrhea.  · Mild stomach upset.  · Sun sensitivity.  SEEK MEDICAL CARE IF:   · If you get worse or do not improve within a few days of starting the medicine.  · Vomiting develops.  · Diaper rash or rash on the genitals appears.  · Vaginal itching occurs.  · White patches appear on the tongue or in the mouth.  · Severe watery diarrhea and abdominal cramps occur.  · Signs of an allergy develop (hives, unknown itchy rash appears). STOP TAKING THE ANTIBIOTIC.  SEEK IMMEDIATE MEDICAL CARE IF:   · Urine turns dark or blood colored.  · Skin turns yellow.  · Easy bruising or bleeding occurs.  · Joint pain or muscle aches occur.  · Fever returns.  · Severe headache occurs.  · Signs of an allergy develop (trouble breathing, wheezing, swelling of the lips, face or tongue, fainting, or blisters on the skin or in the mouth). STOP TAKING THE ANTIBIOTIC.     This information is not intended to replace advice given to you by your health care provider. Make sure you discuss any questions you have with your health care provider.     Document Released: 08/30/2005 Document Revised: 01/08/2016 Document Reviewed: 09/09/2010  [employer] Interactive Patient Education ©2016 [employer]    Depression / Suicide Risk    As you are discharged from this [facility] facility, it is important to learn how to keep safe from harming yourself.    Recognize the warning signs:  · Abrupt changes in personality, positive or negative- including increase in energy   · Giving away possessions  · Change in eating patterns- significant weight changes-  positive or negative  · Change in sleeping patterns- unable to sleep or sleeping all the time   · Unwillingness or inability to communicate  · Depression  · Unusual sadness, discouragement and loneliness  · Talk of wanting to die  · Neglect of personal appearance   · Rebelliousness- reckless behavior  · Withdrawal  from people/activities they love  · Confusion- inability to concentrate     If you or a loved one observes any of these behaviors or has concerns about self-harm, here's what you can do:  · Talk about it- your feelings and reasons for harming yourself  · Remove any means that you might use to hurt yourself (examples: pills, rope, extension cords, firearm)  · Get professional help from the community (Mental Health, Substance Abuse, psychological counseling)  · Do not be alone:Call your Safe Contact- someone whom you trust who will be there for you.  · Call your local CRISIS HOTLINE 005-2661 or 314-976-8080  · Call your local Children's Mobile Crisis Response Team [place] (879) 932-0885 or www.[employer]  · Call the toll free National Suicide Prevention Hotlines   · National Suicide Prevention Lifeline 546-725-SFJN (7878)  · National Hope Line Network 800-SUICIDE (719-4831)

## 2017-09-29 NOTE — PROGRESS NOTES
Vicki Rajan Fall Risk Assessment:     Last Known Fall: No falls  Mobility: No limitations  Medications: Cardiovascular or central nervous system meds  Mental Status/LOC/Awareness: Awake, alert, and oriented to date, place, and person  Toileting Needs: No needs  Volume/Electrolyte Status: No problems  Communication/Sensory: Visual (Glasses)/hearing deficit  Behavior: Appropriate behavior  Vicki Rajan Fall Risk Total: 4  Fall Risk Level: NO RISK    Universal Fall Precautions:  call light/belongings in reach, bed in low position and locked, siderails up x 2, use non-slip footwear, adequate lighting, clutter free and spill free environment, educate on level of risk, educate to call for assistance    Fall Risk Level Interventions:          Patient Specific Interventions:     Medication: review medications with patient and family  Mental Status/LOC/Awareness: reinforce the use of call light  Toileting: not applicable  Volume/Electrolyte Status: monitor abnormal lab values  Communication/Sensory: update plan of care on whiteboard  Behavioral: not applicable  Mobility: ensure bed is locked and in lowest position

## 2017-09-29 NOTE — CARE PLAN
Problem: Safety  Goal: Will remain free from falls  Outcome: PROGRESSING AS EXPECTED  Vicki Rajan Fall Risk Assessment:     Last Known Fall: No falls  Mobility: No limitations  Medications: Cardiovascular or central nervous system meds  Mental Status/LOC/Awareness: Awake, alert, and oriented to date, place, and person  Toileting Needs: No needs  Volume/Electrolyte Status: No problems  Communication/Sensory: Visual (Glasses)/hearing deficit  Behavior: Appropriate behavior  Vicki Rajan Fall Risk Total: 4  Fall Risk Level: NO RISK    Universal Fall Precautions:  call light/belongings in reach, bed in low position and locked, siderails up x 2, use non-slip footwear    Fall Risk Level Interventions:          Patient Specific Interventions:     Medication: review medications with patient and family  Mental Status/LOC/Awareness: reinforce falls education  Toileting: monitor intake and output/use of appropriate interventions  Volume/Electrolyte Status: monitor abnormal lab values  Communication/Sensory: update plan of care on whiteboard  Behavioral: engage patient in daily activities  Mobility: dangle prior to standing    Problem: Venous Thromboembolism (VTW)/Deep Vein Thrombosis (DVT) Prevention:  Goal: Patient will participate in Venous Thrombosis (VTE)/Deep Vein Thrombosis (DVT)Prevention Measures  Outcome: PROGRESSING AS EXPECTED

## 2017-09-29 NOTE — PROGRESS NOTES
[facility] Nephrology Progress Note, Adult, Complex               Author: Fish Wilder Date & Time created: 9/29/2017  9:56 AM     Interval History:  The patient is a 61-year-old gentleman with past   medical history of diabetes, hypertension, polycystic kidney disease, status   post living unrelated kidney transplant in September of 2010 on chronic   immunosuppression, baseline serum creatinine around 1.9-2.2, followed by Dr. León in our office and for CKD, presents to [facility]   with complaints of nonproductive dry cough over the past 2 days with sweats.    He was found to have pneumonia and admitted for treatment with IV antibiotics.    His serum creatinine on admission was 2.42, which went up to 2.98.  His   baseline as mentioned is around 1.9-2.2.  Hence, nephrology was called to   evaluate for acute renal failure on chronic kidney disease.  Patient has been   taking his immunosuppressive medications.  His last tacrolimus level we have   on record is 6.7.  He has been compliant with his medications and diet.  There   is no history of diarrhea, nausea or vomiting.  He does; however, admit to   taking NSAIDs/Aleve for the past couple of weeks every day for hip pain.  No   history of hematuria or difficulty in urination or pain over the kidney.    Daily Nephrology Summary:  9/28/17 - Scr 3. Non-oliguric. Admitted with right lower lobe pneumonia.   9/29/17 - Scr is improving. Non-oliguric. Feels fine. SKINNY: 1.  Right lower quadrant renal transplant. No hydronephrosis or fluid collection.  2.  Normal anastomotic artery velocity. 3.  Minimal elevation of the lower pole resistive index at 0.72. 4.  No Doppler evidence of rejection.  5.  Enlarged prostate gland.      Review of Systems:  Review of Systems   Constitutional: Negative.    HENT: Negative.    Eyes: Negative.    Respiratory: Negative.    Cardiovascular: Negative.    Gastrointestinal: Negative.    Genitourinary: Negative.     Musculoskeletal: Negative.    Skin: Negative.    Neurological: Negative.    Psychiatric/Behavioral: Negative.        Physical Exam:  Physical Exam   Constitutional: He is oriented to person, place, and time. He appears well-developed and well-nourished.   HENT:   Head: Normocephalic and atraumatic.   Eyes: EOM are normal. Pupils are equal, round, and reactive to light.   Neck: Normal range of motion. Neck supple.   Cardiovascular: Normal rate and regular rhythm.    Pulmonary/Chest: Effort normal.   Coarse BS right lung base   Abdominal: Soft. Bowel sounds are normal.   Musculoskeletal: Normal range of motion.   Neurological: He is alert and oriented to person, place, and time.   Skin: Skin is warm and dry.   Nursing note and vitals reviewed.      Labs:        Invalid input(s): [id number]      Recent Labs      09/27/17 0222 09/28/17 0409 09/29/17 0314   SODIUM  138  138  137   POTASSIUM  4.1  4.6  4.5   CHLORIDE  107  106  107   CO2  20  22  22   BUN  46*  44*  38*   CREATININE  2.98*  3.05*  2.26*   MAGNESIUM   --   1.6  2.3   PHOSPHORUS   --   2.7  2.8   CALCIUM  7.7*  7.9*  8.6     Recent Labs      09/26/17 2218 09/27/17 0222 09/28/17 0409 09/29/17 0314   ALTSGPT  33   --   28  26   ASTSGOT  29   --   24  24   ALKPHOSPHAT  152*   --   186*  189*   TBILIRUBIN  2.0*   --   0.9  0.5   GLUCOSE  135*  75  181*  163*     Recent Labs      09/27/17 0222 09/28/17 0409 09/28/17   0835  09/29/17 0314   RBC  4.51*  4.17*   --   4.19*   HEMOGLOBIN  12.3*  11.3*   --   11.5*   HEMATOCRIT  38.7*  36.5*   --   36.4*   PLATELETCT  106*  130*   --   152*   IRON   --    --   13*   --    FERRITIN   --    --   766.6*   --    TOTIRONBC   --    --   161*   --      Recent Labs      09/26/17 2218 09/27/17 0222 09/28/17 0409 09/29/17 0314   WBC  11.3*  10.2  7.7  6.1   NEUTSPOLYS  84.40*  88.90*  83.50*  72.50*   LYMPHOCYTES  0.00*  2.60*  1.70*  6.50*   MONOCYTES  1.70  3.40  0.90  13.50*    EOSINOPHILS  0.90  0.80  0.80  2.10   BASOPHILS  0.00  0.80  0.00  0.70   ASTSGOT  29   --   24  24   ALTSGPT  33   --   28  26   ALKPHOSPHAT  152*   --   186*  189*   TBILIRUBIN  2.0*   --   0.9  0.5           Hemodynamics:  Temp (24hrs), Av.8 °C (98.2 °F), Min:36.3 °C (97.3 °F), Max:37.8 °C (100.1 °F)  Temperature: 36.3 °C (97.3 °F)  Pulse  Av.8  Min: 57  Max: 110   Blood Pressure: 143/74     Respiratory:    Respiration: (!) 112, Pulse Oximetry: 93 %        RUL Breath Sounds: Clear, RML Breath Sounds: Clear, RLL Breath Sounds: Diminished, BERNARD Breath Sounds: Clear, LLL Breath Sounds: Diminished  Fluids:    Intake/Output Summary (Last 24 hours) at 17 0956  Last data filed at 17 0800   Gross per 24 hour   Intake              240 ml   Output                0 ml   Net              240 ml     Weight: 114.1 kg (251 lb 8.7 oz)  GI/Nutrition:  Orders Placed This Encounter   Procedures   • Diet Order     Standing Status:   Standing     Number of Occurrences:   1     Order Specific Question:   Diet:     Answer:   Diabetic [3]     Medical Decision Making, by Problem:  Active Hospital Problems    Diagnosis   • *Sepsis (CMS-HCC) [A41.9]   • Pneumonia [J18.9]   • SANKET (acute kidney injury) (CMS-HCC) [N17.9]   • Hypertension [I10]   • Renal Transplant 9/10/2010 2nd to Polycystic Kidney Disease [Z94.0]         Reviewed items::  Labs reviewed, Medications reviewed and Radiology images reviewed  ASSESSMENT:  1.  Acute renal failure on chronic kidney disease.  This is likely secondary   to nonsteroidal anti-inflammatory meds/Sepsis also contributing. Scr is improving.   2.  Chronic kidney disease, stage III.  3.  Kidney transplant.  4.  Chronic immunosuppression.  5.  Diabetes mellitus.  6.  Hypertension.  7.  Pneumonia. RLL  8.  Acidosis.    Ok from renal standpoint to D/c home today if stable from primary team point of view. Will follow up with him as outpt.

## 2017-09-29 NOTE — ASSESSMENT & PLAN NOTE
Patient is s/p renal transplant 2011, on immunosuppressants mycophenolate, tacrolimus, prednisone 5 mg   Nephrology on board, appreciate recommendations   Renal USG normal, renal functions stable

## 2017-09-29 NOTE — PROGRESS NOTES
Assumed pt care @ 1900. Received bedside shift report. Plan of care for tonight reviewed with pt and questions answered. Safety precautions in place. Will cont to monitor closely.

## 2017-09-29 NOTE — CARE PLAN
Problem: Venous Thromboembolism (VTW)/Deep Vein Thrombosis (DVT) Prevention:  Goal: Patient will participate in Venous Thrombosis (VTE)/Deep Vein Thrombosis (DVT)Prevention Measures  Outcome: PROGRESSING AS EXPECTED  Heparin for dvt prophylaxis    Problem: Knowledge Deficit  Goal: Knowledge of disease process/condition, treatment plan, diagnostic tests, and medications will improve  Outcome: PROGRESSING AS EXPECTED  Plan of care reviewed with pt

## 2017-09-29 NOTE — PROGRESS NOTES
Received report and met with patient at bedside. Discussed plan of care for the day. Denies any needs at this time. Bed locked & in lowest position, call bell and side table within reach.

## 2017-09-29 NOTE — ASSESSMENT & PLAN NOTE
Ferritin at 700 (acute phase reactant), low serum iron  Needs outpatient colonoscopy as recommended for age

## 2017-09-29 NOTE — PROGRESS NOTES
Monitor summary    SB/SR with frequent couplets, frequent PVCs, occasional PACs 5 beats of accelerated idioventricular  52-75  .20/.10/.36

## 2017-09-30 NOTE — PROGRESS NOTES
Pt dc'd to home at 1630. IV d/c'd. Paperwork discussed and signed, copy provided to patient. Pt accompanied by daughter.

## 2017-09-30 NOTE — DISCHARGE SUMMARY
Internal Medicine Discharge Summary      Admit Date:  9/26/2017       Discharge Date:   9/29/17    Service:   R Internal Medicine orange Team  Attending Physician(s): Dr. Casey  Senior Resident(s): Dr. Yuen  Ridge Resident(s):   Dr Patricia       Primary Diagnosis:   Sepsis secondary to right lower lobe pneumonia(resolved)  Acute kidney injury on chronic kidney disease (resolved )    Secondary Diagnoses:                  CAD (coronary artery disease) POA: Yes    Hypertension (Chronic) POA: Yes    Renal Transplant 9/10/2010 2nd to Polycystic Kidney Disease POA: Ye    Pneumonia POA: Yes    Anemia of chronic disease POA: Yes      Hospital Summary (Brief Narrative):       For complete details please refer to history and physical on admission.  Mr. pal is a 61-year-old male with a past medical history of coronary artery disease status post stent placement in 2013, kidney transplant 2010 due to polycystic kidney disease on immunosuppressant medication such as tacrolimus, mycophenolate mofetil, prednisone, diabetes mellitus, hypertension who was admitted for sepsis secondary to right lower lobe pneumonia, acute on chronic kidney disease.  Patient was admitted to telemetry and treated with IV fluids, IV antibiotics ceftriaxone, azithromycin. Sepsis resolved, he was transitioned to oral medicine Augmentin. Azithromycin was discontinued due to possible increase in tacrolimus levels (which is normal at 6.7). Switch to doxycycline.   Nephrology was consulted for acute on chronic kidney disease with history of kidney transplant. Per recommendations continued on immunosuppressants. Renal ultrasound of transplanted kidney was normal. Kidney functions improved with IV fluids.  Patient was hematologically stable, asymptomatic on discharge.     Patient /Hospital Summary (Details -- Problem Oriented) :          Sepsis (resolved)  Right lower lobe pneumonia  Discharged on Augmentin, doxycycline for a total of 7  days.  Advised to follow up with primary care physician after discharge in 2 weeks.    Acute kidney injury (resolved)  Chronic kidney disease  History of renal transplant for polycystic kidney disease in 2010  As recommended by nephrology, continue noninvasive present medications discharged on it. He was stable to be discharged per nephrology.    Coronary artery disease  Patient is a symptomatically continued on aspirin and statin metoprolol.      Consultants:     Nephrology Dr. Wilder     Procedures:        None    Imaging/ Testing:      US-RENAL TRANSPLANT   Final Result      1.  Right lower quadrant renal transplant. No hydronephrosis or fluid collection.      2.  Normal anastomotic artery velocity.      3.  Minimal elevation of the lower pole resistive index at 0.72.      4.  No Doppler evidence of rejection.      5.  Enlarged prostate gland.      DX-CHEST-2 VIEWS   Final Result         1. No significant interval change. Redemonstration of right lower lobe pneumonia.      DX-CHEST-PORTABLE (1 VIEW)   Final Result      1.  Hypoinflation with probable RIGHT basilar pneumonia, and minimal LEFT lung base atelectasis.   2.  Mild cardiac enlargement, increased from prior exam.   3.  No pneumothorax or pulmonary edema.               Discharge Medications:              Medication List      START taking these medications      Instructions   amoxicillin-clavulanate SR 1000-62.5 MG Tb12  Commonly known as:  AUGMENTIN SR  Notes to patient:  Tonight   Take 1 Tab by mouth 2 times a day for 3 days.  Dose:  1 Tab     doxycycline monohydrate 100 MG tablet  Commonly known as:  ADOXA  Notes to patient:  Tonight   Take 1 Tab by mouth every 12 hours for 3 days.  Dose:  100 mg        CONTINUE taking these medications      Instructions   aspirin EC 81 MG Tbec  Commonly known as:  ECOTRIN   Take 81 mg by mouth every day.  Dose:  81 mg     atorvastatin 80 MG tablet  Commonly known as:  LIPITOR  Notes to patient:  Tonight   Doctor's  comments:  called to [facility]  Take 1 Tab by mouth every evening.  Dose:  80 mg     glyBURIDE 5 MG Tabs  Commonly known as:  DIABETA  Notes to patient:  With dinner   Take 10 mg by mouth 2 times a day, with meals.  Dose:  10 mg     linagliptin 5 MG Tabs tablet  Commonly known as:  TRADJENTA  Notes to patient:  Tomorrow   Take 1 Tab by mouth every day.  Dose:  5 mg     metoprolol SR 25 MG Tb24  Commonly known as:  TOPROL XL  Notes to patient:  Tomorrow   Doctor's comments:  called to [facility]  TAKE ONE TABLET BY MOUTH EVERY DAY     mycophenolate 250 MG Caps  Commonly known as:  CELLCEPT   Take 500 mg by mouth 2 times a day.  Dose:  500 mg     omeprazole 20 MG Tbec delayed-release tablet  Commonly known as:  PRILOSEC  Notes to patient:  Tomorrow   Take 20 mg by mouth every day.  Dose:  20 mg     pioglitazone 45 MG Tabs  Commonly known as:  ACTOS  Notes to patient:  Tomorrow   Take 45 mg by mouth every day.  Dose:  45 mg     predniSONE 5 MG Tabs  Commonly known as:  DELTASONE  Notes to patient:  Tomorrow   Take 5 mg by mouth every day. Take with food  Dose:  5 mg     tacrolimus 1 MG Caps  Commonly known as:  PROGRAF  Notes to patient:  Tonight   Take 1 mg by mouth 2 times a day.  Dose:  1 mg     tramadol 50 MG Tabs  Commonly known as:  ULTRAM  Notes to patient:  Tonight   Take 50 mg by mouth every evening.  Dose:  50 mg            Disposition:   Home    Diet: Consistent carbohydrate diet, low-sodium    Activity: As tolerated    Instructions:       The patient was instructed to return to the ER in the event of worsening symptoms. I have counseled the patient on the importance of compliance and the patient has agreed to proceed with all medical recommendations and follow up plan indicated above.   The patient understands that all medications come with benefits and risks. Risks may include permanent injury or death and these risks can be minimized with close reassessment and monitoring.        Primary Care Provider:  SHARON Jones  Discharge summary faxed to primary care provider:  Deferred  Copy of discharge summary given to the patient: Deferred      Follow up appointment details :      Follow up with nephrology in 2 weeks, with repeat labs to monitor renal functions  Follow-up with primary care physician in 2 weeks    Pending Studies:        None    Time spent on discharge day patient visit, preparing discharge paperwork and arranging for patient follow up.    Summary of follow up issues:   Follow-up for renal functions with nephrology    Discharge Time (Minutes) : 45 minutes        Condition on Discharge    ______________________________________________________________________    Interval history/exam for day of discharge:    Patient was asymptomatic on discharge. He was anxious to go home. Denied chest pain, shortness of breath, palpitations, wheezing. He was not coughing up anything. He was able to have good urine output. Verbalized understanding about follow-up appointments with nephrology and primary care. Hemodynamically stable on discharge    Vitals:    09/28/17 1952 09/28/17 2337 09/29/17 0420 09/29/17 0800   BP: 136/97 141/91 125/78 143/74   Pulse: 62 72 62 66   Resp: 12 12 12 (!) 112   Temp: 37.1 °C (98.7 °F) 36.7 °C (98.1 °F) 37.8 °C (100.1 °F) 36.3 °C (97.3 °F)   TempSrc:       SpO2: 93% 94% 95% 93%   Weight: 114.1 kg (251 lb 8.7 oz)      Height:         Weight/BMI: Body mass index is 29.83 kg/m².  Pulse Oximetry: 93 %, O2 (LPM): 0, O2 Delivery: None (Room Air)    General: Alert oriented to place person and time  CVS: Regular rate and rhythm, no murmur  PULM: Clear to auscultation bilaterally, previously heard right lower lobe crackles resolved  No focal neurological deficits  No pedal edema    Most Recent Labs:    Lab Results   Component Value Date/Time    WBC 6.1 09/29/2017 03:14 AM    RBC 4.19 (L) 09/29/2017 03:14 AM    HEMOGLOBIN 11.5 (L) 09/29/2017 03:14 AM    HEMATOCRIT 36.4 (L) 09/29/2017 03:14 AM     MCV 86.9 09/29/2017 03:14 AM    MCH 27.4 09/29/2017 03:14 AM    MCHC 31.6 (L) 09/29/2017 03:14 AM    MPV 10.7 09/29/2017 03:14 AM    NEUTSPOLYS 72.50 (H) 09/29/2017 03:14 AM    LYMPHOCYTES 6.50 (L) 09/29/2017 03:14 AM    MONOCYTES 13.50 (H) 09/29/2017 03:14 AM    EOSINOPHILS 2.10 09/29/2017 03:14 AM    BASOPHILS 0.70 09/29/2017 03:14 AM    ANISOCYTOSIS 1+ 09/28/2017 04:09 AM      Lab Results   Component Value Date/Time    SODIUM 137 09/29/2017 03:14 AM    POTASSIUM 4.5 09/29/2017 03:14 AM    CHLORIDE 107 09/29/2017 03:14 AM    CO2 22 09/29/2017 03:14 AM    GLUCOSE 163 (H) 09/29/2017 03:14 AM    BUN 38 (H) 09/29/2017 03:14 AM    CREATININE 2.26 (H) 09/29/2017 03:14 AM    CREATININE 2.4 (H) 12/18/2006 06:20 AM      Lab Results   Component Value Date/Time    ALTSGPT 26 09/29/2017 03:14 AM    ASTSGOT 24 09/29/2017 03:14 AM    ALKPHOSPHAT 189 (H) 09/29/2017 03:14 AM    TBILIRUBIN 0.5 09/29/2017 03:14 AM    ALBUMIN 2.7 (L) 09/29/2017 03:14 AM    GLOBULIN 2.5 09/29/2017 03:14 AM     No results found for: PROTHROMBTM, INR

## 2017-09-30 NOTE — PROGRESS NOTES
The patient was evaluated and examined with the resident staff.  Please refer resident note for complete information.I am actively involved in the patient's care.     Possible discharge today,Discussed the risks and benefits of meds in details with Pt and Pt verbally understands   FU Nephro rec

## 2017-10-02 LAB
BACTERIA BLD CULT: NORMAL
SIGNIFICANT IND 70042: NORMAL
SITE SITE: NORMAL
SOURCE SOURCE: NORMAL

## 2017-10-09 NOTE — ADDENDUM NOTE
Encounter addended by: Elizabeth L. Sawallisch on: 10/9/2017 12:46 PM<BR>    Actions taken: Pend clinical note

## 2017-10-09 NOTE — DOCUMENTATION QUERY
"DOCUMENTATION QUERY    PROVIDERS: Please select “Cosign w/ note” to reply to query.    To better represent the severity of illness of your patient, please review the following information and exercise your independent professional judgment in responding to this query.     Conflicting documentation for Simple vs Severe Sepsis: PN 9/27 states sepsis without related organ dysfunction. Then on PN 9/29 -- Acute renal failure is stated as \"likely secondary to nonsteroidal anti-inflammatory meds/Sepsis also contributing.\" Per coding guidelines, the clinical indicator for severe sepsis is a major organ dysfunction/failure/shock DUE to Sepsis. Based upon the clinical findings, risk factors, and treatment, can the relationship between these two conditions be further specified?    • Acute renal failure is related to sepsis (Severe Sepsis)  • Acute renal failure is not related to sepsis (No Severe Sepsis)  • Other explanation of clinical findings (please document)  • Unable to determine        The medical record reflects the following:   Clinical Findings  serum creatinine on admission 2.42, which went up to 2.98   baseline as mentioned is around 1.9-2.2   last tacrolimus level we have on record is 6.7  taking NSAIDs/Aleve    Treatment  IVF  Antibiotics  Re-start home immunosuppresant   Risk Factors  Kidney transplant status   Location within medical record  Progress Notes     Thank you,   Elizabeth Sawallisch        "

## 2017-10-25 ENCOUNTER — HOSPITAL ENCOUNTER (OUTPATIENT)
Dept: LAB | Facility: MEDICAL CENTER | Age: 62
End: 2017-10-25
Attending: INTERNAL MEDICINE
Payer: COMMERCIAL

## 2017-10-25 LAB
ALBUMIN SERPL BCP-MCNC: 3.9 G/DL (ref 3.2–4.9)
ALBUMIN/GLOB SERPL: 1.7 G/DL
ALP SERPL-CCNC: 80 U/L (ref 30–99)
ALT SERPL-CCNC: 17 U/L (ref 2–50)
ANION GAP SERPL CALC-SCNC: 8 MMOL/L (ref 0–11.9)
APPEARANCE UR: CLEAR
AST SERPL-CCNC: 16 U/L (ref 12–45)
BACTERIA #/AREA URNS HPF: NEGATIVE /HPF
BASOPHILS # BLD AUTO: 1.4 % (ref 0–1.8)
BASOPHILS # BLD: 0.06 K/UL (ref 0–0.12)
BILIRUB SERPL-MCNC: 0.8 MG/DL (ref 0.1–1.5)
BILIRUB UR QL STRIP.AUTO: NEGATIVE
BUN SERPL-MCNC: 35 MG/DL (ref 8–22)
CALCIUM SERPL-MCNC: 9.1 MG/DL (ref 8.5–10.5)
CHLORIDE SERPL-SCNC: 103 MMOL/L (ref 96–112)
CHOLEST SERPL-MCNC: 135 MG/DL (ref 100–199)
CO2 SERPL-SCNC: 27 MMOL/L (ref 20–33)
COLOR UR: YELLOW
CREAT SERPL-MCNC: 1.63 MG/DL (ref 0.5–1.4)
CREAT UR-MCNC: 110.9 MG/DL
EOSINOPHIL # BLD AUTO: 0.06 K/UL (ref 0–0.51)
EOSINOPHIL NFR BLD: 1.4 % (ref 0–6.9)
EPI CELLS #/AREA URNS HPF: NEGATIVE /HPF
ERYTHROCYTE [DISTWIDTH] IN BLOOD BY AUTOMATED COUNT: 48.3 FL (ref 35.9–50)
EST. AVERAGE GLUCOSE BLD GHB EST-MCNC: 160 MG/DL
GFR SERPL CREATININE-BSD FRML MDRD: 43 ML/MIN/1.73 M 2
GLOBULIN SER CALC-MCNC: 2.3 G/DL (ref 1.9–3.5)
GLUCOSE SERPL-MCNC: 130 MG/DL (ref 65–99)
GLUCOSE UR STRIP.AUTO-MCNC: NEGATIVE MG/DL
HBA1C MFR BLD: 7.2 % (ref 0–5.6)
HCT VFR BLD AUTO: 43.2 % (ref 42–52)
HDLC SERPL-MCNC: 41 MG/DL
HGB BLD-MCNC: 13.4 G/DL (ref 14–18)
HYALINE CASTS #/AREA URNS LPF: ABNORMAL /LPF
IMM GRANULOCYTES # BLD AUTO: 0.03 K/UL (ref 0–0.11)
IMM GRANULOCYTES NFR BLD AUTO: 0.7 % (ref 0–0.9)
KETONES UR STRIP.AUTO-MCNC: NEGATIVE MG/DL
LDLC SERPL CALC-MCNC: 52 MG/DL
LEUKOCYTE ESTERASE UR QL STRIP.AUTO: ABNORMAL
LYMPHOCYTES # BLD AUTO: 0.94 K/UL (ref 1–4.8)
LYMPHOCYTES NFR BLD: 22.4 % (ref 22–41)
MAGNESIUM SERPL-MCNC: 1.7 MG/DL (ref 1.5–2.5)
MCH RBC QN AUTO: 27.6 PG (ref 27–33)
MCHC RBC AUTO-ENTMCNC: 31 G/DL (ref 33.7–35.3)
MCV RBC AUTO: 89.1 FL (ref 81.4–97.8)
MICRO URNS: ABNORMAL
MONOCYTES # BLD AUTO: 0.44 K/UL (ref 0–0.85)
MONOCYTES NFR BLD AUTO: 10.5 % (ref 0–13.4)
NEUTROPHILS # BLD AUTO: 2.66 K/UL (ref 1.82–7.42)
NEUTROPHILS NFR BLD: 63.6 % (ref 44–72)
NITRITE UR QL STRIP.AUTO: NEGATIVE
NRBC # BLD AUTO: 0 K/UL
NRBC BLD AUTO-RTO: 0 /100 WBC
PH UR STRIP.AUTO: 6 [PH]
PHOSPHATE SERPL-MCNC: 3.2 MG/DL (ref 2.5–4.5)
PLATELET # BLD AUTO: 122 K/UL (ref 164–446)
PMV BLD AUTO: 11.8 FL (ref 9–12.9)
POTASSIUM SERPL-SCNC: 4.1 MMOL/L (ref 3.6–5.5)
PROT SERPL-MCNC: 6.2 G/DL (ref 6–8.2)
PROT UR QL STRIP: NEGATIVE MG/DL
PROT UR-MCNC: 13.6 MG/DL (ref 0–15)
PROT/CREAT UR: 123 MG/G (ref 15–68)
RBC # BLD AUTO: 4.85 M/UL (ref 4.7–6.1)
RBC # URNS HPF: ABNORMAL /HPF
RBC UR QL AUTO: NEGATIVE
SODIUM SERPL-SCNC: 138 MMOL/L (ref 135–145)
SP GR UR STRIP.AUTO: 1.02
TRIGL SERPL-MCNC: 208 MG/DL (ref 0–149)
URATE SERPL-MCNC: 6.3 MG/DL (ref 2.5–8.3)
UROBILINOGEN UR STRIP.AUTO-MCNC: 0.2 MG/DL
WBC # BLD AUTO: 4.2 K/UL (ref 4.8–10.8)
WBC #/AREA URNS HPF: ABNORMAL /HPF

## 2017-10-25 PROCEDURE — 80053 COMPREHEN METABOLIC PANEL: CPT

## 2017-10-25 PROCEDURE — 82570 ASSAY OF URINE CREATININE: CPT

## 2017-10-25 PROCEDURE — 83735 ASSAY OF MAGNESIUM: CPT

## 2017-10-25 PROCEDURE — 83036 HEMOGLOBIN GLYCOSYLATED A1C: CPT

## 2017-10-25 PROCEDURE — 80061 LIPID PANEL: CPT

## 2017-10-25 PROCEDURE — 85025 COMPLETE CBC W/AUTO DIFF WBC: CPT

## 2017-10-25 PROCEDURE — 81001 URINALYSIS AUTO W/SCOPE: CPT

## 2017-10-25 PROCEDURE — 84550 ASSAY OF BLOOD/URIC ACID: CPT

## 2017-10-25 PROCEDURE — 84156 ASSAY OF PROTEIN URINE: CPT

## 2017-10-25 PROCEDURE — 84100 ASSAY OF PHOSPHORUS: CPT

## 2017-10-25 PROCEDURE — 36415 COLL VENOUS BLD VENIPUNCTURE: CPT

## 2018-02-27 ENCOUNTER — HOSPITAL ENCOUNTER (OUTPATIENT)
Dept: LAB | Facility: MEDICAL CENTER | Age: 63
End: 2018-02-27
Attending: NURSE PRACTITIONER
Payer: COMMERCIAL

## 2018-02-27 LAB
ALBUMIN SERPL BCP-MCNC: 4.1 G/DL (ref 3.2–4.9)
ALBUMIN/GLOB SERPL: 1.8 G/DL
ALP SERPL-CCNC: 67 U/L (ref 30–99)
ALT SERPL-CCNC: 26 U/L (ref 2–50)
AMYLASE SERPL-CCNC: 64 U/L (ref 20–103)
ANION GAP SERPL CALC-SCNC: 9 MMOL/L (ref 0–11.9)
APPEARANCE UR: CLEAR
AST SERPL-CCNC: 16 U/L (ref 12–45)
BACTERIA #/AREA URNS HPF: NEGATIVE /HPF
BASOPHILS # BLD AUTO: 1 % (ref 0–1.8)
BASOPHILS # BLD: 0.05 K/UL (ref 0–0.12)
BILIRUB SERPL-MCNC: 0.8 MG/DL (ref 0.1–1.5)
BILIRUB UR QL STRIP.AUTO: NEGATIVE
BUN SERPL-MCNC: 37 MG/DL (ref 8–22)
CALCIUM SERPL-MCNC: 9.2 MG/DL (ref 8.5–10.5)
CHLORIDE SERPL-SCNC: 103 MMOL/L (ref 96–112)
CO2 SERPL-SCNC: 26 MMOL/L (ref 20–33)
COLOR UR: YELLOW
CREAT SERPL-MCNC: 1.84 MG/DL (ref 0.5–1.4)
CREAT UR-MCNC: 78.3 MG/DL
EOSINOPHIL # BLD AUTO: 0.04 K/UL (ref 0–0.51)
EOSINOPHIL NFR BLD: 0.8 % (ref 0–6.9)
EPI CELLS #/AREA URNS HPF: NEGATIVE /HPF
ERYTHROCYTE [DISTWIDTH] IN BLOOD BY AUTOMATED COUNT: 44.8 FL (ref 35.9–50)
GLOBULIN SER CALC-MCNC: 2.3 G/DL (ref 1.9–3.5)
GLUCOSE SERPL-MCNC: 113 MG/DL (ref 65–99)
GLUCOSE UR STRIP.AUTO-MCNC: NEGATIVE MG/DL
HCT VFR BLD AUTO: 47 % (ref 42–52)
HGB BLD-MCNC: 14.9 G/DL (ref 14–18)
HYALINE CASTS #/AREA URNS LPF: ABNORMAL /LPF
IMM GRANULOCYTES # BLD AUTO: 0.04 K/UL (ref 0–0.11)
IMM GRANULOCYTES NFR BLD AUTO: 0.8 % (ref 0–0.9)
KETONES UR STRIP.AUTO-MCNC: NEGATIVE MG/DL
LEUKOCYTE ESTERASE UR QL STRIP.AUTO: ABNORMAL
LYMPHOCYTES # BLD AUTO: 0.91 K/UL (ref 1–4.8)
LYMPHOCYTES NFR BLD: 18 % (ref 22–41)
MAGNESIUM SERPL-MCNC: 1.8 MG/DL (ref 1.5–2.5)
MCH RBC QN AUTO: 28.2 PG (ref 27–33)
MCHC RBC AUTO-ENTMCNC: 31.7 G/DL (ref 33.7–35.3)
MCV RBC AUTO: 89 FL (ref 81.4–97.8)
MICRO URNS: ABNORMAL
MONOCYTES # BLD AUTO: 0.56 K/UL (ref 0–0.85)
MONOCYTES NFR BLD AUTO: 11.1 % (ref 0–13.4)
NEUTROPHILS # BLD AUTO: 3.45 K/UL (ref 1.82–7.42)
NEUTROPHILS NFR BLD: 68.3 % (ref 44–72)
NITRITE UR QL STRIP.AUTO: NEGATIVE
NRBC # BLD AUTO: 0 K/UL
NRBC BLD-RTO: 0 /100 WBC
PH UR STRIP.AUTO: 5.5 [PH]
PHOSPHATE SERPL-MCNC: 3.2 MG/DL (ref 2.5–4.5)
PLATELET # BLD AUTO: 102 K/UL (ref 164–446)
PMV BLD AUTO: 11.8 FL (ref 9–12.9)
POTASSIUM SERPL-SCNC: 4.1 MMOL/L (ref 3.6–5.5)
PROT SERPL-MCNC: 6.4 G/DL (ref 6–8.2)
PROT UR QL STRIP: NEGATIVE MG/DL
PROT UR-MCNC: 8.9 MG/DL (ref 0–15)
PROT/CREAT UR: 114 MG/G (ref 15–68)
RBC # BLD AUTO: 5.28 M/UL (ref 4.7–6.1)
RBC # URNS HPF: ABNORMAL /HPF
RBC UR QL AUTO: NEGATIVE
SODIUM SERPL-SCNC: 138 MMOL/L (ref 135–145)
SP GR UR STRIP.AUTO: 1.01
UROBILINOGEN UR STRIP.AUTO-MCNC: 0.2 MG/DL
WBC # BLD AUTO: 5.1 K/UL (ref 4.8–10.8)
WBC #/AREA URNS HPF: ABNORMAL /HPF

## 2018-02-27 PROCEDURE — 85025 COMPLETE CBC W/AUTO DIFF WBC: CPT

## 2018-02-27 PROCEDURE — 82570 ASSAY OF URINE CREATININE: CPT

## 2018-02-27 PROCEDURE — 80053 COMPREHEN METABOLIC PANEL: CPT

## 2018-02-27 PROCEDURE — 83735 ASSAY OF MAGNESIUM: CPT

## 2018-02-27 PROCEDURE — 80197 ASSAY OF TACROLIMUS: CPT

## 2018-02-27 PROCEDURE — 81001 URINALYSIS AUTO W/SCOPE: CPT

## 2018-02-27 PROCEDURE — 87086 URINE CULTURE/COLONY COUNT: CPT

## 2018-02-27 PROCEDURE — 84156 ASSAY OF PROTEIN URINE: CPT

## 2018-02-27 PROCEDURE — 84100 ASSAY OF PHOSPHORUS: CPT

## 2018-02-27 PROCEDURE — 36415 COLL VENOUS BLD VENIPUNCTURE: CPT

## 2018-02-27 PROCEDURE — 82150 ASSAY OF AMYLASE: CPT

## 2018-03-01 LAB
BACTERIA UR CULT: NORMAL
SIGNIFICANT IND 70042: NORMAL
SITE SITE: NORMAL
SOURCE SOURCE: NORMAL
TACROLIMUS BLD-MCNC: 5.4 NG/ML

## 2018-05-15 ENCOUNTER — HOSPITAL ENCOUNTER (OUTPATIENT)
Dept: RADIOLOGY | Facility: MEDICAL CENTER | Age: 63
End: 2018-05-15
Attending: NURSE PRACTITIONER
Payer: COMMERCIAL

## 2018-05-15 DIAGNOSIS — M79.609 PAIN IN EXTREMITY, UNSPECIFIED EXTREMITY: ICD-10-CM

## 2018-05-15 PROCEDURE — 93971 EXTREMITY STUDY: CPT | Mod: LT

## 2018-07-24 ENCOUNTER — HOSPITAL ENCOUNTER (OUTPATIENT)
Dept: LAB | Facility: MEDICAL CENTER | Age: 63
End: 2018-07-24
Attending: INTERNAL MEDICINE
Payer: COMMERCIAL

## 2018-07-24 LAB
25(OH)D3 SERPL-MCNC: 33 NG/ML (ref 30–100)
ALBUMIN SERPL BCP-MCNC: 4.7 G/DL (ref 3.2–4.9)
ALBUMIN/GLOB SERPL: 2 G/DL
ALP SERPL-CCNC: 71 U/L (ref 30–99)
ALT SERPL-CCNC: 19 U/L (ref 2–50)
ANION GAP SERPL CALC-SCNC: 9 MMOL/L (ref 0–11.9)
APPEARANCE UR: CLEAR
AST SERPL-CCNC: 15 U/L (ref 12–45)
BACTERIA #/AREA URNS HPF: NEGATIVE /HPF
BASOPHILS # BLD AUTO: 0.7 % (ref 0–1.8)
BASOPHILS # BLD: 0.04 K/UL (ref 0–0.12)
BILIRUB SERPL-MCNC: 0.8 MG/DL (ref 0.1–1.5)
BILIRUB UR QL STRIP.AUTO: NEGATIVE
BUN SERPL-MCNC: 36 MG/DL (ref 8–22)
CALCIUM SERPL-MCNC: 9.6 MG/DL (ref 8.5–10.5)
CHLORIDE SERPL-SCNC: 104 MMOL/L (ref 96–112)
CHOLEST SERPL-MCNC: 121 MG/DL (ref 100–199)
CO2 SERPL-SCNC: 27 MMOL/L (ref 20–33)
COLOR UR: YELLOW
CREAT SERPL-MCNC: 1.68 MG/DL (ref 0.5–1.4)
CREAT UR-MCNC: 95.6 MG/DL
EOSINOPHIL # BLD AUTO: 0.05 K/UL (ref 0–0.51)
EOSINOPHIL NFR BLD: 0.9 % (ref 0–6.9)
EPI CELLS #/AREA URNS HPF: NEGATIVE /HPF
ERYTHROCYTE [DISTWIDTH] IN BLOOD BY AUTOMATED COUNT: 46.1 FL (ref 35.9–50)
EST. AVERAGE GLUCOSE BLD GHB EST-MCNC: 163 MG/DL
GLOBULIN SER CALC-MCNC: 2.3 G/DL (ref 1.9–3.5)
GLUCOSE SERPL-MCNC: 118 MG/DL (ref 65–99)
GLUCOSE UR STRIP.AUTO-MCNC: 100 MG/DL
HBA1C MFR BLD: 7.3 % (ref 0–5.6)
HCT VFR BLD AUTO: 47.4 % (ref 42–52)
HDLC SERPL-MCNC: 47 MG/DL
HGB BLD-MCNC: 15 G/DL (ref 14–18)
HYALINE CASTS #/AREA URNS LPF: ABNORMAL /LPF
IMM GRANULOCYTES # BLD AUTO: 0.04 K/UL (ref 0–0.11)
IMM GRANULOCYTES NFR BLD AUTO: 0.7 % (ref 0–0.9)
KETONES UR STRIP.AUTO-MCNC: NEGATIVE MG/DL
LDLC SERPL CALC-MCNC: 43 MG/DL
LEUKOCYTE ESTERASE UR QL STRIP.AUTO: ABNORMAL
LYMPHOCYTES # BLD AUTO: 0.72 K/UL (ref 1–4.8)
LYMPHOCYTES NFR BLD: 12.9 % (ref 22–41)
MAGNESIUM SERPL-MCNC: 1.7 MG/DL (ref 1.5–2.5)
MCH RBC QN AUTO: 28 PG (ref 27–33)
MCHC RBC AUTO-ENTMCNC: 31.6 G/DL (ref 33.7–35.3)
MCV RBC AUTO: 88.6 FL (ref 81.4–97.8)
MICRO URNS: ABNORMAL
MONOCYTES # BLD AUTO: 0.57 K/UL (ref 0–0.85)
MONOCYTES NFR BLD AUTO: 10.2 % (ref 0–13.4)
NEUTROPHILS # BLD AUTO: 4.15 K/UL (ref 1.82–7.42)
NEUTROPHILS NFR BLD: 74.6 % (ref 44–72)
NITRITE UR QL STRIP.AUTO: NEGATIVE
NRBC # BLD AUTO: 0 K/UL
NRBC BLD-RTO: 0 /100 WBC
PH UR STRIP.AUTO: 6 [PH]
PHOSPHATE SERPL-MCNC: 3 MG/DL (ref 2.5–4.5)
PLATELET # BLD AUTO: 117 K/UL (ref 164–446)
PMV BLD AUTO: 11.6 FL (ref 9–12.9)
POTASSIUM SERPL-SCNC: 4.3 MMOL/L (ref 3.6–5.5)
PROT SERPL-MCNC: 7 G/DL (ref 6–8.2)
PROT UR QL STRIP: NEGATIVE MG/DL
PROT UR-MCNC: 8.9 MG/DL (ref 0–15)
PROT/CREAT UR: 93 MG/G (ref 15–68)
PTH-INTACT SERPL-MCNC: 53.8 PG/ML (ref 14–72)
RBC # BLD AUTO: 5.35 M/UL (ref 4.7–6.1)
RBC # URNS HPF: ABNORMAL /HPF
RBC UR QL AUTO: NEGATIVE
SODIUM SERPL-SCNC: 140 MMOL/L (ref 135–145)
SP GR UR STRIP.AUTO: 1.02
TRIGL SERPL-MCNC: 154 MG/DL (ref 0–149)
URATE SERPL-MCNC: 6.5 MG/DL (ref 2.5–8.3)
UROBILINOGEN UR STRIP.AUTO-MCNC: 0.2 MG/DL
WBC # BLD AUTO: 5.6 K/UL (ref 4.8–10.8)
WBC #/AREA URNS HPF: ABNORMAL /HPF

## 2018-07-24 PROCEDURE — 36415 COLL VENOUS BLD VENIPUNCTURE: CPT

## 2018-07-24 PROCEDURE — 80061 LIPID PANEL: CPT

## 2018-07-24 PROCEDURE — 80053 COMPREHEN METABOLIC PANEL: CPT

## 2018-07-24 PROCEDURE — 80197 ASSAY OF TACROLIMUS: CPT

## 2018-07-24 PROCEDURE — 82306 VITAMIN D 25 HYDROXY: CPT

## 2018-07-24 PROCEDURE — 84100 ASSAY OF PHOSPHORUS: CPT

## 2018-07-24 PROCEDURE — 84156 ASSAY OF PROTEIN URINE: CPT

## 2018-07-24 PROCEDURE — 81001 URINALYSIS AUTO W/SCOPE: CPT

## 2018-07-24 PROCEDURE — 83036 HEMOGLOBIN GLYCOSYLATED A1C: CPT

## 2018-07-24 PROCEDURE — 85025 COMPLETE CBC W/AUTO DIFF WBC: CPT

## 2018-07-24 PROCEDURE — 82570 ASSAY OF URINE CREATININE: CPT

## 2018-07-24 PROCEDURE — 84550 ASSAY OF BLOOD/URIC ACID: CPT

## 2018-07-24 PROCEDURE — 83735 ASSAY OF MAGNESIUM: CPT

## 2018-07-24 PROCEDURE — 83970 ASSAY OF PARATHORMONE: CPT

## 2018-07-26 LAB — TACROLIMUS BLD-MCNC: 5.5 NG/ML

## 2018-09-06 ENCOUNTER — HOSPITAL ENCOUNTER (OUTPATIENT)
Dept: RADIOLOGY | Facility: MEDICAL CENTER | Age: 63
End: 2018-09-06
Attending: NURSE PRACTITIONER
Payer: COMMERCIAL

## 2018-09-06 DIAGNOSIS — M62.40 CONTRACTURE OF TENDON SHEATH: ICD-10-CM

## 2018-09-06 DIAGNOSIS — I77.71 DISSECTION OF CAROTID ARTERY (HCC): ICD-10-CM

## 2018-09-06 PROCEDURE — 93880 EXTRACRANIAL BILAT STUDY: CPT

## 2018-10-05 ENCOUNTER — HOSPITAL ENCOUNTER (EMERGENCY)
Facility: MEDICAL CENTER | Age: 63
End: 2018-10-05
Attending: EMERGENCY MEDICINE
Payer: COMMERCIAL

## 2018-10-05 VITALS
WEIGHT: 236.77 LBS | TEMPERATURE: 97.9 F | DIASTOLIC BLOOD PRESSURE: 78 MMHG | RESPIRATION RATE: 16 BRPM | HEART RATE: 65 BPM | SYSTOLIC BLOOD PRESSURE: 128 MMHG | BODY MASS INDEX: 27.96 KG/M2 | OXYGEN SATURATION: 98 % | HEIGHT: 77 IN

## 2018-10-05 DIAGNOSIS — N30.01 ACUTE CYSTITIS WITH HEMATURIA: ICD-10-CM

## 2018-10-05 LAB
ALBUMIN SERPL BCP-MCNC: 4.6 G/DL (ref 3.2–4.9)
ALBUMIN/GLOB SERPL: 1.8 G/DL
ALP SERPL-CCNC: 85 U/L (ref 30–99)
ALT SERPL-CCNC: 25 U/L (ref 2–50)
ANION GAP SERPL CALC-SCNC: 9 MMOL/L (ref 0–11.9)
APPEARANCE UR: ABNORMAL
AST SERPL-CCNC: 19 U/L (ref 12–45)
BACTERIA #/AREA URNS HPF: NEGATIVE /HPF
BASOPHILS # BLD AUTO: 0.6 % (ref 0–1.8)
BASOPHILS # BLD: 0.05 K/UL (ref 0–0.12)
BILIRUB SERPL-MCNC: 1.1 MG/DL (ref 0.1–1.5)
BILIRUB UR QL STRIP.AUTO: ABNORMAL
BUN SERPL-MCNC: 35 MG/DL (ref 8–22)
CALCIUM SERPL-MCNC: 9.9 MG/DL (ref 8.5–10.5)
CHLORIDE SERPL-SCNC: 102 MMOL/L (ref 96–112)
CO2 SERPL-SCNC: 24 MMOL/L (ref 20–33)
COLOR UR: ABNORMAL
CREAT SERPL-MCNC: 1.74 MG/DL (ref 0.5–1.4)
CRP SERPL HS-MCNC: 1 MG/L (ref 0–7.5)
EOSINOPHIL # BLD AUTO: 0.03 K/UL (ref 0–0.51)
EOSINOPHIL NFR BLD: 0.3 % (ref 0–6.9)
EPI CELLS #/AREA URNS HPF: NEGATIVE /HPF
ERYTHROCYTE [DISTWIDTH] IN BLOOD BY AUTOMATED COUNT: 46.9 FL (ref 35.9–50)
GLOBULIN SER CALC-MCNC: 2.6 G/DL (ref 1.9–3.5)
GLUCOSE SERPL-MCNC: 147 MG/DL (ref 65–99)
GLUCOSE UR STRIP.AUTO-MCNC: NEGATIVE MG/DL
HCT VFR BLD AUTO: 46.5 % (ref 42–52)
HGB BLD-MCNC: 15 G/DL (ref 14–18)
HYALINE CASTS #/AREA URNS LPF: ABNORMAL /LPF
IMM GRANULOCYTES # BLD AUTO: 0.06 K/UL (ref 0–0.11)
IMM GRANULOCYTES NFR BLD AUTO: 0.7 % (ref 0–0.9)
KETONES UR STRIP.AUTO-MCNC: NEGATIVE MG/DL
LEUKOCYTE ESTERASE UR QL STRIP.AUTO: ABNORMAL
LYMPHOCYTES # BLD AUTO: 0.55 K/UL (ref 1–4.8)
LYMPHOCYTES NFR BLD: 6.3 % (ref 22–41)
MCH RBC QN AUTO: 28.2 PG (ref 27–33)
MCHC RBC AUTO-ENTMCNC: 32.3 G/DL (ref 33.7–35.3)
MCV RBC AUTO: 87.4 FL (ref 81.4–97.8)
MICRO URNS: ABNORMAL
MONOCYTES # BLD AUTO: 0.6 K/UL (ref 0–0.85)
MONOCYTES NFR BLD AUTO: 6.9 % (ref 0–13.4)
NEUTROPHILS # BLD AUTO: 7.45 K/UL (ref 1.82–7.42)
NEUTROPHILS NFR BLD: 85.2 % (ref 44–72)
NITRITE UR QL STRIP.AUTO: NEGATIVE
NRBC # BLD AUTO: 0 K/UL
NRBC BLD-RTO: 0 /100 WBC
PH UR STRIP.AUTO: 5 [PH]
PLATELET # BLD AUTO: 119 K/UL (ref 164–446)
PMV BLD AUTO: 11.1 FL (ref 9–12.9)
POTASSIUM SERPL-SCNC: 4.4 MMOL/L (ref 3.6–5.5)
PROT SERPL-MCNC: 7.2 G/DL (ref 6–8.2)
PROT UR QL STRIP: 100 MG/DL
RBC # BLD AUTO: 5.32 M/UL (ref 4.7–6.1)
RBC # URNS HPF: >150 /HPF
RBC UR QL AUTO: ABNORMAL
SODIUM SERPL-SCNC: 135 MMOL/L (ref 135–145)
SP GR UR STRIP.AUTO: 1.01
URATE SERPL-MCNC: 6.5 MG/DL (ref 2.5–8.3)
UROBILINOGEN UR STRIP.AUTO-MCNC: 0.2 MG/DL
WBC # BLD AUTO: 8.7 K/UL (ref 4.8–10.8)
WBC #/AREA URNS HPF: ABNORMAL /HPF

## 2018-10-05 PROCEDURE — 80053 COMPREHEN METABOLIC PANEL: CPT

## 2018-10-05 PROCEDURE — 81001 URINALYSIS AUTO W/SCOPE: CPT

## 2018-10-05 PROCEDURE — 84550 ASSAY OF BLOOD/URIC ACID: CPT

## 2018-10-05 PROCEDURE — 96365 THER/PROPH/DIAG IV INF INIT: CPT

## 2018-10-05 PROCEDURE — 86141 C-REACTIVE PROTEIN HS: CPT

## 2018-10-05 PROCEDURE — 700111 HCHG RX REV CODE 636 W/ 250 OVERRIDE (IP): Performed by: EMERGENCY MEDICINE

## 2018-10-05 PROCEDURE — 87086 URINE CULTURE/COLONY COUNT: CPT

## 2018-10-05 PROCEDURE — 85025 COMPLETE CBC W/AUTO DIFF WBC: CPT

## 2018-10-05 PROCEDURE — 700105 HCHG RX REV CODE 258: Performed by: EMERGENCY MEDICINE

## 2018-10-05 PROCEDURE — 99284 EMERGENCY DEPT VISIT MOD MDM: CPT

## 2018-10-05 RX ORDER — ONDANSETRON 4 MG/1
4 TABLET, ORALLY DISINTEGRATING ORAL EVERY 6 HOURS PRN
Qty: 15 TAB | Refills: 0 | Status: ON HOLD | OUTPATIENT
Start: 2018-10-05 | End: 2020-11-15

## 2018-10-05 RX ORDER — LEVOFLOXACIN 500 MG/1
500 TABLET, FILM COATED ORAL DAILY
Qty: 10 TAB | Refills: 0 | Status: SHIPPED | OUTPATIENT
Start: 2018-10-05 | End: 2018-10-15

## 2018-10-05 RX ADMIN — CEFTRIAXONE SODIUM 2 G: 2 INJECTION, POWDER, FOR SOLUTION INTRAMUSCULAR; INTRAVENOUS at 15:37

## 2018-10-05 ASSESSMENT — PAIN SCALES - GENERAL: PAINLEVEL_OUTOF10: 4

## 2018-10-05 NOTE — ED PROVIDER NOTES
ED Provider Note    Scribed for Rivera Basurto M.D. by Mark Ma. 10/5/2018  2:32 PM    Primary care provider: SHARON Jones  Means of arrival: Walk-in  History obtained from: Patient  History limited by: None    CHIEF COMPLAINT  Chief Complaint   Patient presents with   • Blood in Urine   • Ankle Pain       HPI  Jama Altman is a 62 y.o. male who presents to the Emergency Department complaining of hematuria that he first noticed this morning. His urine is light red. At 7:00 PM yesterday he experienced left ankle pain. The pain was similar to pain associated prior episodes of gout. He reports mild ankle swelling and poor range of motion of the ankle. He is followed by Dr. León, Nephrology. He underwent a kidney transplant in 2010. He is taking various medications including Prograf and prednisone. Since the transplant he has not experienced any gout symptoms. Patient reports a history of bilateral knee surgeries. He denies back pain or flank pain.    REVIEW OF SYSTEMS  Pertinent negatives include no back pain or flank pain. As above, all other systems reviewed and are negative.   See HPI for further details.     PAST MEDICAL HISTORY   has a past medical history of Benign essential hypertension; Hyperlipoproteinemia; Hypertension; Pain; Polycystic kidney (9/10/10); Sleep apnea; and Snoring. Gout    SURGICAL HISTORY   has a past surgical history that includes knee arthroplasty total (1/12/07); knee arthroscopy (4/10/06); other orthopedic surgery (7/8/74); other (9/10/10); knee arthroscopy (5/3/2011); medial meniscectomy (5/3/2011); knee unicompartmental (12/23/2011); knee arthroscopy (12/23/2011); and knee manipulation (2/16/2012).    SOCIAL HISTORY  Social History   Substance Use Topics   • Smoking status: Never Smoker   • Smokeless tobacco: Never Used   • Alcohol use No      History   Drug Use No       FAMILY HISTORY  None noted    CURRENT MEDICATIONS  Home Medications     Reviewed by Sana CASTRO  "DEEPTHI Ta (Registered Nurse) on 10/05/18 at 1315  Med List Status: Complete   Medication Last Dose Status   aspirin EC (ECOTRIN) 81 MG Tablet Delayed Response 10/5/2018 Active   atorvastatin (LIPITOR) 80 MG tablet 10/5/2018 Active   glyBURIDE (DIABETA) 5 MG TABS 10/5/2018 Active   linagliptin (TRADJENTA) 5 MG TABS tablet 10/5/2018 Active   metoprolol SR (TOPROL XL) 25 MG TABLET SR 24 HR 10/5/2018 Active   mycophenolate (CELLCEPT) 250 MG Cap 10/5/2018 Active   omeprazole (PRILOSEC) 20 MG Tablet Delayed Response delayed-release tablet 10/5/2018 Active   pioglitazone (ACTOS) 45 MG Tab 10/5/2018 Active   predniSONE (DELTASONE) 5 MG TABS 10/5/2018 Active   tacrolimus (PROGRAF) 1 MG CAPS 10/5/2018 Active   tramadol (ULTRAM) 50 MG TABS 10/5/2018 Active                ALLERGIES  Allergies   Allergen Reactions   • Doxycycline Rash     Sweats and shakes: 9/28/17: Clarified allergy with patient. Allergy was in 1998 and he doesn't remember what happened. He thought the medication is for pain.  Tolerates doxycycline 9/2017       PHYSICAL EXAM  VITAL SIGNS: /83   Pulse 63   Temp 36.6 °C (97.9 °F)   Resp 17   Ht 1.956 m (6' 5\")   Wt 107.4 kg (236 lb 12.4 oz)   SpO2 96%   BMI 28.08 kg/m²   Constitutional: Well developed, Well nourished, Sitting in chair in no acute distress, Non-toxic appearance.   HENT: Normocephalic, Atraumatic, Bilateral external ears normal, Oropharynx is clear mucous membranes are moist. No oral exudates or nasal discharge.   Eyes: Pupils are equal round and reactive, EOMI, Conjunctiva normal, No discharge.   Neck: Normal range of motion, No tenderness, Supple, No stridor. No meningismus.  Cardiovascular: Good perfusion  Thorax & Lungs: Normal respiratory effort  Abdomen is soft and nontender is.  Specifically there is no tender over the right renal graft  Skin: Normal without rash.   Back: No CVA or spinal tenderness.   Extremities: Intact distal pulses, Bulging of the synovium of the left " ankle joint. Tenderness along the medial joint line of the left ankle with fusion, No cyanosis, No clubbing. Capillary refill is less than 2 seconds.  Musculoskeletal: Good range of motion in all major joints. No major deformities noted.   Neurologic: Alert & oriented x 3, Normal motor function, Normal sensory function, No focal deficits noted.  Psychiatric: Affect normal, Judgment normal, Mood normal. There is no suicidal ideation or patient reported hallucinations.       DIAGNOSTIC STUDIES / PROCEDURES    LABS  Labs Reviewed   URINALYSIS,CULTURE IF INDICATED - Abnormal; Notable for the following:        Result Value    Character Cloudy (*)     Protein 100 (*)     Bilirubin Small (*)     Leukocyte Esterase Moderate (*)     Occult Blood Large (*)     All other components within normal limits   URINE MICROSCOPIC (W/UA) - Abnormal; Notable for the following:     WBC 20-50 (*)     RBC >150 (*)     All other components within normal limits   COMP METABOLIC PANEL - Abnormal; Notable for the following:     Glucose 147 (*)     Bun 35 (*)     Creatinine 1.74 (*)     All other components within normal limits   CBC WITH DIFFERENTIAL - Abnormal; Notable for the following:     MCHC 32.3 (*)     Platelet Count 119 (*)     Neutrophils-Polys 85.20 (*)     Lymphocytes 6.30 (*)     Neutrophils (Absolute) 7.45 (*)     Lymphs (Absolute) 0.55 (*)     All other components within normal limits   ESTIMATED GFR - Abnormal; Notable for the following:     GFR If  48 (*)     GFR If Non  40 (*)     All other components within normal limits   URINE CULTURE(NEW)   CRP HIGH SENSITIVE (CARDIAC)   URIC ACID      All labs reviewed by me.    COURSE & MEDICAL DECISION MAKING  Nursing notes, VS, PMSFHx reviewed in chart.    2:32 PM Patient seen and examined at bedside. Ordered for CBC with differential, CRP high sensitive, uric acid, CMP, urine microscopic with U/A, U/A culture if indicated, and urine culture to  evaluate.    3:16 PM I ordered ceftriaxone 2 g in  ml IVPB to initially treat.  Patient will be subsequently treated with Levaquin times 10 days pending urine culture results.    3:30 PM I ordered lab work to compare his kidney function and this shows that his creatinine is not appreciably changed from his last measurement back in July of 1.68.  He is now 1.74 with a BUN of 35.  No evidence of acidosis, electrolyte derangements, leukocytosis.  C-reactive protein is unremarkable and uric acid is normal.    4:04 PM Review of laboratory results reveal demonstrated unchanged renal function compared to prior studies.    4:05 PM Recheck: Patient re-evaluated at [facility]. Patient is resting comfortably. Discussed patient's condition and treatment plan including the likelihood of discharge. Patient's lab results discussed. The patient understood and is in agreement. Patient will be discharged with a prescription for Levaquin and Zofran ODT.    HTN/IDDM FOLLOW UP:  The patient has known hypertension and is being followed by their primary care doctor    The patient will return for new or worsening symptoms and is stable at the time of discharge.  We have a call out still to [facility] transplant program and apparently Dr. Villa will be calling back.  We will give them information on their patient for appropriate follow-up.  Currently there is no evidence of pyelonephritis    DISPOSITION:  Patient will be discharged home in stable condition.    FINAL IMPRESSION  1. Acute cystitis with hematuria          Mark STOCKTON (Scribjacque), am scribing for, and in the presence of, Rivera Basurto M.D..    Electronically signed by: Mark Ma (Prieto), 10/5/2018    Rivera STOCKTON M.D. personally performed the services described in this documentation, as scribed by Mark Ma in my presence, and it is both accurate and complete. C    The note accurately reflects work and decisions made by me.  Rivera Basurto   10/5/2018  4:38 PM

## 2018-10-05 NOTE — ED TRIAGE NOTES
"Chief Complaint   Patient presents with   • Blood in Urine   • Ankle Pain       Patient stated he had blood in his urine this morning. Patient states his urine is light red in color. Patient has a history of kidney transplant in 2010 with no complications since. Patient also states he has \"gout like symptoms in his left ankle\".     Patient placed back out in lobby and updated on triage process.   "

## 2018-10-07 LAB
BACTERIA UR CULT: NORMAL
SIGNIFICANT IND 70042: NORMAL
SITE SITE: NORMAL
SOURCE SOURCE: NORMAL

## 2019-01-22 ENCOUNTER — HOSPITAL ENCOUNTER (OUTPATIENT)
Dept: LAB | Facility: MEDICAL CENTER | Age: 64
End: 2019-01-22
Attending: INTERNAL MEDICINE
Payer: COMMERCIAL

## 2019-01-22 LAB
25(OH)D3 SERPL-MCNC: 16 NG/ML (ref 30–100)
ALBUMIN SERPL BCP-MCNC: 4.2 G/DL (ref 3.2–4.9)
ALBUMIN/GLOB SERPL: 2 G/DL
ALP SERPL-CCNC: 77 U/L (ref 30–99)
ALT SERPL-CCNC: 30 U/L (ref 2–50)
ANION GAP SERPL CALC-SCNC: 7 MMOL/L (ref 0–11.9)
APPEARANCE UR: CLEAR
AST SERPL-CCNC: 21 U/L (ref 12–45)
BASOPHILS # BLD AUTO: 0.8 % (ref 0–1.8)
BASOPHILS # BLD: 0.05 K/UL (ref 0–0.12)
BILIRUB SERPL-MCNC: 0.7 MG/DL (ref 0.1–1.5)
BILIRUB UR QL STRIP.AUTO: NEGATIVE
BUN SERPL-MCNC: 49 MG/DL (ref 8–22)
CALCIUM SERPL-MCNC: 9.4 MG/DL (ref 8.5–10.5)
CHLORIDE SERPL-SCNC: 104 MMOL/L (ref 96–112)
CHOLEST SERPL-MCNC: 127 MG/DL (ref 100–199)
CO2 SERPL-SCNC: 28 MMOL/L (ref 20–33)
COLOR UR: YELLOW
CREAT SERPL-MCNC: 1.82 MG/DL (ref 0.5–1.4)
CREAT UR-MCNC: 68.3 MG/DL
EOSINOPHIL # BLD AUTO: 0.05 K/UL (ref 0–0.51)
EOSINOPHIL NFR BLD: 0.8 % (ref 0–6.9)
ERYTHROCYTE [DISTWIDTH] IN BLOOD BY AUTOMATED COUNT: 48.2 FL (ref 35.9–50)
EST. AVERAGE GLUCOSE BLD GHB EST-MCNC: 154 MG/DL
GLOBULIN SER CALC-MCNC: 2.1 G/DL (ref 1.9–3.5)
GLUCOSE SERPL-MCNC: 123 MG/DL (ref 65–99)
GLUCOSE UR STRIP.AUTO-MCNC: NEGATIVE MG/DL
HBA1C MFR BLD: 7 % (ref 0–5.6)
HCT VFR BLD AUTO: 46.6 % (ref 42–52)
HDLC SERPL-MCNC: 43 MG/DL
HGB BLD-MCNC: 14.7 G/DL (ref 14–18)
IMM GRANULOCYTES # BLD AUTO: 0.04 K/UL (ref 0–0.11)
IMM GRANULOCYTES NFR BLD AUTO: 0.7 % (ref 0–0.9)
KETONES UR STRIP.AUTO-MCNC: NEGATIVE MG/DL
LDLC SERPL CALC-MCNC: 46 MG/DL
LEUKOCYTE ESTERASE UR QL STRIP.AUTO: NEGATIVE
LYMPHOCYTES # BLD AUTO: 0.9 K/UL (ref 1–4.8)
LYMPHOCYTES NFR BLD: 15 % (ref 22–41)
MAGNESIUM SERPL-MCNC: 1.7 MG/DL (ref 1.5–2.5)
MCH RBC QN AUTO: 28.9 PG (ref 27–33)
MCHC RBC AUTO-ENTMCNC: 31.5 G/DL (ref 33.7–35.3)
MCV RBC AUTO: 91.6 FL (ref 81.4–97.8)
MICRO URNS: NORMAL
MONOCYTES # BLD AUTO: 0.58 K/UL (ref 0–0.85)
MONOCYTES NFR BLD AUTO: 9.7 % (ref 0–13.4)
NEUTROPHILS # BLD AUTO: 4.37 K/UL (ref 1.82–7.42)
NEUTROPHILS NFR BLD: 73 % (ref 44–72)
NITRITE UR QL STRIP.AUTO: NEGATIVE
NRBC # BLD AUTO: 0 K/UL
NRBC BLD-RTO: 0 /100 WBC
PH UR STRIP.AUTO: 6 [PH]
PHOSPHATE SERPL-MCNC: 3.2 MG/DL (ref 2.5–4.5)
PLATELET # BLD AUTO: 114 K/UL (ref 164–446)
PMV BLD AUTO: 12.1 FL (ref 9–12.9)
POTASSIUM SERPL-SCNC: 4.4 MMOL/L (ref 3.6–5.5)
PROT SERPL-MCNC: 6.3 G/DL (ref 6–8.2)
PROT UR QL STRIP: NEGATIVE MG/DL
PROT UR-MCNC: 5.8 MG/DL (ref 0–15)
PTH-INTACT SERPL-MCNC: 62.5 PG/ML (ref 14–72)
RBC # BLD AUTO: 5.09 M/UL (ref 4.7–6.1)
RBC UR QL AUTO: NEGATIVE
SODIUM SERPL-SCNC: 139 MMOL/L (ref 135–145)
SP GR UR STRIP.AUTO: 1.01
TRIGL SERPL-MCNC: 191 MG/DL (ref 0–149)
URATE SERPL-MCNC: 7.1 MG/DL (ref 2.5–8.3)
UROBILINOGEN UR STRIP.AUTO-MCNC: 0.2 MG/DL
WBC # BLD AUTO: 6 K/UL (ref 4.8–10.8)

## 2019-01-22 PROCEDURE — 82306 VITAMIN D 25 HYDROXY: CPT

## 2019-01-22 PROCEDURE — 87799 DETECT AGENT NOS DNA QUANT: CPT

## 2019-01-22 PROCEDURE — 84100 ASSAY OF PHOSPHORUS: CPT

## 2019-01-22 PROCEDURE — 84156 ASSAY OF PROTEIN URINE: CPT

## 2019-01-22 PROCEDURE — 84550 ASSAY OF BLOOD/URIC ACID: CPT

## 2019-01-22 PROCEDURE — 82570 ASSAY OF URINE CREATININE: CPT

## 2019-01-22 PROCEDURE — 83036 HEMOGLOBIN GLYCOSYLATED A1C: CPT

## 2019-01-22 PROCEDURE — 80061 LIPID PANEL: CPT

## 2019-01-22 PROCEDURE — 36415 COLL VENOUS BLD VENIPUNCTURE: CPT

## 2019-01-22 PROCEDURE — 85025 COMPLETE CBC W/AUTO DIFF WBC: CPT

## 2019-01-22 PROCEDURE — 81003 URINALYSIS AUTO W/O SCOPE: CPT

## 2019-01-22 PROCEDURE — 80197 ASSAY OF TACROLIMUS: CPT

## 2019-01-22 PROCEDURE — 80053 COMPREHEN METABOLIC PANEL: CPT

## 2019-01-22 PROCEDURE — 83970 ASSAY OF PARATHORMONE: CPT

## 2019-01-22 PROCEDURE — 83735 ASSAY OF MAGNESIUM: CPT

## 2019-01-23 LAB — TACROLIMUS BLD-MCNC: 5.6 NG/ML

## 2019-01-25 LAB
BKV DNA # UR NAA+PROBE: <390 CPY/ML
BKV DNA SPEC NAA+PROBE-LOG#: <2.6 LOG CPY/ML
DIAGNOSTIC IMP SPEC-IMP: NOT DETECTED

## 2019-05-29 ENCOUNTER — HOSPITAL ENCOUNTER (OUTPATIENT)
Dept: LAB | Facility: MEDICAL CENTER | Age: 64
End: 2019-05-29
Attending: INTERNAL MEDICINE
Payer: COMMERCIAL

## 2019-05-29 LAB
25(OH)D3 SERPL-MCNC: 26 NG/ML (ref 30–100)
ALBUMIN SERPL BCP-MCNC: 4.2 G/DL (ref 3.2–4.9)
ALBUMIN/GLOB SERPL: 2 G/DL
ALP SERPL-CCNC: 66 U/L (ref 30–99)
ALT SERPL-CCNC: 21 U/L (ref 2–50)
ANION GAP SERPL CALC-SCNC: 6 MMOL/L (ref 0–11.9)
APPEARANCE UR: CLEAR
AST SERPL-CCNC: 17 U/L (ref 12–45)
BILIRUB SERPL-MCNC: 0.9 MG/DL (ref 0.1–1.5)
BILIRUB UR QL STRIP.AUTO: NEGATIVE
BUN SERPL-MCNC: 34 MG/DL (ref 8–22)
CALCIUM SERPL-MCNC: 9.2 MG/DL (ref 8.5–10.5)
CHLORIDE SERPL-SCNC: 106 MMOL/L (ref 96–112)
CHOLEST SERPL-MCNC: 132 MG/DL (ref 100–199)
CO2 SERPL-SCNC: 29 MMOL/L (ref 20–33)
COLOR UR: YELLOW
CREAT SERPL-MCNC: 1.9 MG/DL (ref 0.5–1.4)
CREAT UR-MCNC: 83.8 MG/DL
EST. AVERAGE GLUCOSE BLD GHB EST-MCNC: 148 MG/DL
FASTING STATUS PATIENT QL REPORTED: NORMAL
GLOBULIN SER CALC-MCNC: 2.1 G/DL (ref 1.9–3.5)
GLUCOSE SERPL-MCNC: 100 MG/DL (ref 65–99)
GLUCOSE UR STRIP.AUTO-MCNC: 250 MG/DL
HBA1C MFR BLD: 6.8 % (ref 0–5.6)
HDLC SERPL-MCNC: 46 MG/DL
KETONES UR STRIP.AUTO-MCNC: NEGATIVE MG/DL
LDLC SERPL CALC-MCNC: 54 MG/DL
LEUKOCYTE ESTERASE UR QL STRIP.AUTO: NEGATIVE
MAGNESIUM SERPL-MCNC: 1.7 MG/DL (ref 1.5–2.5)
MICRO URNS: ABNORMAL
NITRITE UR QL STRIP.AUTO: NEGATIVE
PH UR STRIP.AUTO: 6 [PH]
PHOSPHATE SERPL-MCNC: 3.3 MG/DL (ref 2.5–4.5)
POTASSIUM SERPL-SCNC: 4.4 MMOL/L (ref 3.6–5.5)
PROT SERPL-MCNC: 6.3 G/DL (ref 6–8.2)
PROT UR QL STRIP: NEGATIVE MG/DL
PROT UR-MCNC: 5.7 MG/DL (ref 0–15)
PTH-INTACT SERPL-MCNC: 54.4 PG/ML (ref 14–72)
RBC UR QL AUTO: NEGATIVE
SODIUM SERPL-SCNC: 141 MMOL/L (ref 135–145)
SP GR UR STRIP.AUTO: 1.01
TRIGL SERPL-MCNC: 162 MG/DL (ref 0–149)
URATE SERPL-MCNC: 6.1 MG/DL (ref 2.5–8.3)
UROBILINOGEN UR STRIP.AUTO-MCNC: 0.2 MG/DL

## 2019-05-29 PROCEDURE — 84550 ASSAY OF BLOOD/URIC ACID: CPT

## 2019-05-29 PROCEDURE — 81003 URINALYSIS AUTO W/O SCOPE: CPT

## 2019-05-29 PROCEDURE — 84156 ASSAY OF PROTEIN URINE: CPT

## 2019-05-29 PROCEDURE — 80061 LIPID PANEL: CPT

## 2019-05-29 PROCEDURE — 80197 ASSAY OF TACROLIMUS: CPT

## 2019-05-29 PROCEDURE — 85025 COMPLETE CBC W/AUTO DIFF WBC: CPT

## 2019-05-29 PROCEDURE — 36415 COLL VENOUS BLD VENIPUNCTURE: CPT

## 2019-05-29 PROCEDURE — 83036 HEMOGLOBIN GLYCOSYLATED A1C: CPT

## 2019-05-29 PROCEDURE — 83970 ASSAY OF PARATHORMONE: CPT

## 2019-05-29 PROCEDURE — 82570 ASSAY OF URINE CREATININE: CPT

## 2019-05-29 PROCEDURE — 84100 ASSAY OF PHOSPHORUS: CPT

## 2019-05-29 PROCEDURE — 80053 COMPREHEN METABOLIC PANEL: CPT

## 2019-05-29 PROCEDURE — 83735 ASSAY OF MAGNESIUM: CPT

## 2019-05-29 PROCEDURE — 82306 VITAMIN D 25 HYDROXY: CPT

## 2019-05-30 LAB
BASOPHILS # BLD AUTO: 1.1 % (ref 0–1.8)
BASOPHILS # BLD: 0.05 K/UL (ref 0–0.12)
EOSINOPHIL # BLD AUTO: 0.05 K/UL (ref 0–0.51)
EOSINOPHIL NFR BLD: 1.1 % (ref 0–6.9)
ERYTHROCYTE [DISTWIDTH] IN BLOOD BY AUTOMATED COUNT: 49.3 FL (ref 35.9–50)
HCT VFR BLD AUTO: 48.3 % (ref 42–52)
HGB BLD-MCNC: 14.5 G/DL (ref 14–18)
IMM GRANULOCYTES # BLD AUTO: 0.02 K/UL (ref 0–0.11)
IMM GRANULOCYTES NFR BLD AUTO: 0.4 % (ref 0–0.9)
LYMPHOCYTES # BLD AUTO: 0.82 K/UL (ref 1–4.8)
LYMPHOCYTES NFR BLD: 17.8 % (ref 22–41)
MCH RBC QN AUTO: 28.4 PG (ref 27–33)
MCHC RBC AUTO-ENTMCNC: 30 G/DL (ref 33.7–35.3)
MCV RBC AUTO: 94.5 FL (ref 81.4–97.8)
MONOCYTES # BLD AUTO: 0.52 K/UL (ref 0–0.85)
MONOCYTES NFR BLD AUTO: 11.3 % (ref 0–13.4)
NEUTROPHILS # BLD AUTO: 3.14 K/UL (ref 1.82–7.42)
NEUTROPHILS NFR BLD: 68.3 % (ref 44–72)
NRBC # BLD AUTO: 0.04 K/UL
NRBC BLD-RTO: 0.9 /100 WBC
PLATELET # BLD AUTO: 100 K/UL (ref 164–446)
PMV BLD AUTO: 12 FL (ref 9–12.9)
RBC # BLD AUTO: 5.11 M/UL (ref 4.7–6.1)
TACROLIMUS BLD-MCNC: 7.5 NG/ML
WBC # BLD AUTO: 4.6 K/UL (ref 4.8–10.8)

## 2019-08-14 ENCOUNTER — HOSPITAL ENCOUNTER (OUTPATIENT)
Dept: LAB | Facility: MEDICAL CENTER | Age: 64
End: 2019-08-14
Attending: INTERNAL MEDICINE
Payer: COMMERCIAL

## 2019-08-14 LAB
ALBUMIN SERPL BCP-MCNC: 3.9 G/DL (ref 3.2–4.9)
ALBUMIN/GLOB SERPL: 1.7 G/DL
ALP SERPL-CCNC: 64 U/L (ref 30–99)
ALT SERPL-CCNC: 25 U/L (ref 2–50)
ANION GAP SERPL CALC-SCNC: 7 MMOL/L (ref 0–11.9)
APPEARANCE UR: CLEAR
AST SERPL-CCNC: 20 U/L (ref 12–45)
BASOPHILS # BLD AUTO: 1 % (ref 0–1.8)
BASOPHILS # BLD: 0.04 K/UL (ref 0–0.12)
BILIRUB SERPL-MCNC: 0.9 MG/DL (ref 0.1–1.5)
BILIRUB UR QL STRIP.AUTO: NEGATIVE
BUN SERPL-MCNC: 36 MG/DL (ref 8–22)
CHLORIDE SERPL-SCNC: 106 MMOL/L (ref 96–112)
CHOLEST SERPL-MCNC: 124 MG/DL (ref 100–199)
CO2 SERPL-SCNC: 27 MMOL/L (ref 20–33)
COLOR UR: YELLOW
CREAT SERPL-MCNC: 1.84 MG/DL (ref 0.5–1.4)
CREAT UR-MCNC: 69.5 MG/DL
EOSINOPHIL # BLD AUTO: 0.06 K/UL (ref 0–0.51)
EOSINOPHIL NFR BLD: 1.4 % (ref 0–6.9)
ERYTHROCYTE [DISTWIDTH] IN BLOOD BY AUTOMATED COUNT: 48.7 FL (ref 35.9–50)
EST. AVERAGE GLUCOSE BLD GHB EST-MCNC: 160 MG/DL
GLOBULIN SER CALC-MCNC: 2.3 G/DL (ref 1.9–3.5)
GLUCOSE SERPL-MCNC: 113 MG/DL (ref 65–99)
GLUCOSE UR STRIP.AUTO-MCNC: NEGATIVE MG/DL
HBA1C MFR BLD: 7.2 % (ref 0–5.6)
HCT VFR BLD AUTO: 45.5 % (ref 42–52)
HDLC SERPL-MCNC: 48 MG/DL
HGB BLD-MCNC: 14.2 G/DL (ref 14–18)
IMM GRANULOCYTES # BLD AUTO: 0.02 K/UL (ref 0–0.11)
IMM GRANULOCYTES NFR BLD AUTO: 0.5 % (ref 0–0.9)
KETONES UR STRIP.AUTO-MCNC: NEGATIVE MG/DL
LDLC SERPL CALC-MCNC: 51 MG/DL
LEUKOCYTE ESTERASE UR QL STRIP.AUTO: NEGATIVE
LYMPHOCYTES # BLD AUTO: 0.79 K/UL (ref 1–4.8)
LYMPHOCYTES NFR BLD: 18.8 % (ref 22–41)
MAGNESIUM SERPL-MCNC: 1.9 MG/DL (ref 1.5–2.5)
MCH RBC QN AUTO: 29.2 PG (ref 27–33)
MCHC RBC AUTO-ENTMCNC: 31.2 G/DL (ref 33.7–35.3)
MCV RBC AUTO: 93.6 FL (ref 81.4–97.8)
MICRO URNS: NORMAL
MONOCYTES # BLD AUTO: 0.5 K/UL (ref 0–0.85)
MONOCYTES NFR BLD AUTO: 11.9 % (ref 0–13.4)
NEUTROPHILS # BLD AUTO: 2.79 K/UL (ref 1.82–7.42)
NEUTROPHILS NFR BLD: 66.4 % (ref 44–72)
NITRITE UR QL STRIP.AUTO: NEGATIVE
NRBC # BLD AUTO: 0 K/UL
NRBC BLD-RTO: 0 /100 WBC
PH UR STRIP.AUTO: 6 [PH] (ref 5–8)
PHOSPHATE SERPL-MCNC: 3.3 MG/DL (ref 2.5–4.5)
PLATELET # BLD AUTO: 86 K/UL (ref 164–446)
PMV BLD AUTO: 12.8 FL (ref 9–12.9)
POTASSIUM SERPL-SCNC: 4.4 MMOL/L (ref 3.6–5.5)
PROT SERPL-MCNC: 6.2 G/DL (ref 6–8.2)
PROT UR QL STRIP: NEGATIVE MG/DL
PROT UR-MCNC: <4 MG/DL (ref 0–15)
PROT/CREAT UR: NORMAL MG/G (ref 15–68)
RBC # BLD AUTO: 4.86 M/UL (ref 4.7–6.1)
RBC UR QL AUTO: NEGATIVE
SODIUM SERPL-SCNC: 140 MMOL/L (ref 135–145)
SP GR UR STRIP.AUTO: 1.01
TRIGL SERPL-MCNC: 123 MG/DL (ref 0–149)
URATE SERPL-MCNC: 6.9 MG/DL (ref 2.5–8.3)
UROBILINOGEN UR STRIP.AUTO-MCNC: 0.2 MG/DL
WBC # BLD AUTO: 4.2 K/UL (ref 4.8–10.8)

## 2019-08-14 PROCEDURE — 83735 ASSAY OF MAGNESIUM: CPT

## 2019-08-14 PROCEDURE — 36415 COLL VENOUS BLD VENIPUNCTURE: CPT

## 2019-08-14 PROCEDURE — 82306 VITAMIN D 25 HYDROXY: CPT

## 2019-08-14 PROCEDURE — 85025 COMPLETE CBC W/AUTO DIFF WBC: CPT

## 2019-08-14 PROCEDURE — 84156 ASSAY OF PROTEIN URINE: CPT

## 2019-08-14 PROCEDURE — 82570 ASSAY OF URINE CREATININE: CPT

## 2019-08-14 PROCEDURE — 83036 HEMOGLOBIN GLYCOSYLATED A1C: CPT

## 2019-08-14 PROCEDURE — 84550 ASSAY OF BLOOD/URIC ACID: CPT

## 2019-08-14 PROCEDURE — 80197 ASSAY OF TACROLIMUS: CPT

## 2019-08-14 PROCEDURE — 80061 LIPID PANEL: CPT

## 2019-08-14 PROCEDURE — 81003 URINALYSIS AUTO W/O SCOPE: CPT

## 2019-08-14 PROCEDURE — 84100 ASSAY OF PHOSPHORUS: CPT

## 2019-08-14 PROCEDURE — 80053 COMPREHEN METABOLIC PANEL: CPT

## 2019-08-14 PROCEDURE — 83970 ASSAY OF PARATHORMONE: CPT

## 2019-08-15 LAB
25(OH)D3 SERPL-MCNC: 55 NG/ML (ref 30–100)
CALCIUM SERPL-MCNC: 9.3 MG/DL (ref 8.5–10.5)
PTH-INTACT SERPL-MCNC: 50 PG/ML (ref 14–72)

## 2019-08-16 LAB — TACROLIMUS BLD-MCNC: 7.6 NG/ML

## 2019-09-03 ENCOUNTER — HOSPITAL ENCOUNTER (OUTPATIENT)
Dept: LAB | Facility: MEDICAL CENTER | Age: 64
End: 2019-09-03
Attending: NURSE PRACTITIONER
Payer: COMMERCIAL

## 2019-09-03 LAB
ALBUMIN SERPL BCP-MCNC: 4.4 G/DL (ref 3.2–4.9)
ALBUMIN/GLOB SERPL: 2.2 G/DL
ALP SERPL-CCNC: 62 U/L (ref 30–99)
ALT SERPL-CCNC: 17 U/L (ref 2–50)
ANION GAP SERPL CALC-SCNC: 8 MMOL/L (ref 0–11.9)
AST SERPL-CCNC: 19 U/L (ref 12–45)
BASOPHILS # BLD AUTO: 0.6 % (ref 0–1.8)
BASOPHILS # BLD: 0.05 K/UL (ref 0–0.12)
BILIRUB SERPL-MCNC: 1 MG/DL (ref 0.1–1.5)
BUN SERPL-MCNC: 45 MG/DL (ref 8–22)
CALCIUM SERPL-MCNC: 9.2 MG/DL (ref 8.5–10.5)
CHLORIDE SERPL-SCNC: 107 MMOL/L (ref 96–112)
CO2 SERPL-SCNC: 24 MMOL/L (ref 20–33)
CREAT SERPL-MCNC: 2.01 MG/DL (ref 0.5–1.4)
EOSINOPHIL # BLD AUTO: 0.01 K/UL (ref 0–0.51)
EOSINOPHIL NFR BLD: 0.1 % (ref 0–6.9)
ERYTHROCYTE [DISTWIDTH] IN BLOOD BY AUTOMATED COUNT: 49.6 FL (ref 35.9–50)
GLOBULIN SER CALC-MCNC: 2 G/DL (ref 1.9–3.5)
GLUCOSE SERPL-MCNC: 149 MG/DL (ref 65–99)
HCT VFR BLD AUTO: 47.1 % (ref 42–52)
HGB BLD-MCNC: 14.6 G/DL (ref 14–18)
IMM GRANULOCYTES # BLD AUTO: 0.04 K/UL (ref 0–0.11)
IMM GRANULOCYTES NFR BLD AUTO: 0.5 % (ref 0–0.9)
LYMPHOCYTES # BLD AUTO: 0.54 K/UL (ref 1–4.8)
LYMPHOCYTES NFR BLD: 6.2 % (ref 22–41)
MCH RBC QN AUTO: 29 PG (ref 27–33)
MCHC RBC AUTO-ENTMCNC: 31 G/DL (ref 33.7–35.3)
MCV RBC AUTO: 93.5 FL (ref 81.4–97.8)
MONOCYTES # BLD AUTO: 0.66 K/UL (ref 0–0.85)
MONOCYTES NFR BLD AUTO: 7.6 % (ref 0–13.4)
NEUTROPHILS # BLD AUTO: 7.38 K/UL (ref 1.82–7.42)
NEUTROPHILS NFR BLD: 85 % (ref 44–72)
NRBC # BLD AUTO: 0 K/UL
NRBC BLD-RTO: 0 /100 WBC
PLATELET # BLD AUTO: 99 K/UL (ref 164–446)
PMV BLD AUTO: 12.8 FL (ref 9–12.9)
POTASSIUM SERPL-SCNC: 4.9 MMOL/L (ref 3.6–5.5)
PROT SERPL-MCNC: 6.4 G/DL (ref 6–8.2)
RBC # BLD AUTO: 5.04 M/UL (ref 4.7–6.1)
SODIUM SERPL-SCNC: 139 MMOL/L (ref 135–145)
TESTOST SERPL-MCNC: 218 NG/DL (ref 175–781)
WBC # BLD AUTO: 8.7 K/UL (ref 4.8–10.8)

## 2019-09-03 PROCEDURE — 85025 COMPLETE CBC W/AUTO DIFF WBC: CPT

## 2019-09-03 PROCEDURE — 36415 COLL VENOUS BLD VENIPUNCTURE: CPT

## 2019-09-03 PROCEDURE — 80053 COMPREHEN METABOLIC PANEL: CPT

## 2019-09-03 PROCEDURE — 84270 ASSAY OF SEX HORMONE GLOBUL: CPT

## 2019-09-03 PROCEDURE — 84403 ASSAY OF TOTAL TESTOSTERONE: CPT

## 2019-09-05 LAB
SHBG SERPL-SCNC: 30 NMOL/L (ref 11–80)
TESTOST FREE MFR SERPL: 1.9 % (ref 1.6–2.9)
TESTOST FREE SERPL-MCNC: 53 PG/ML (ref 47–244)
TESTOST SERPL-MCNC: 281 NG/DL (ref 300–720)

## 2019-11-25 ENCOUNTER — HOSPITAL ENCOUNTER (OUTPATIENT)
Dept: LAB | Facility: MEDICAL CENTER | Age: 64
End: 2019-11-25
Attending: NURSE PRACTITIONER
Payer: COMMERCIAL

## 2019-11-25 ENCOUNTER — HOSPITAL ENCOUNTER (OUTPATIENT)
Dept: LAB | Facility: MEDICAL CENTER | Age: 64
End: 2019-11-25
Attending: INTERNAL MEDICINE
Payer: COMMERCIAL

## 2019-11-25 LAB
25(OH)D3 SERPL-MCNC: 64 NG/ML (ref 30–100)
ALBUMIN SERPL BCP-MCNC: 4.1 G/DL (ref 3.2–4.9)
ALBUMIN SERPL BCP-MCNC: 4.2 G/DL (ref 3.2–4.9)
ALBUMIN/GLOB SERPL: 1.6 G/DL
ALBUMIN/GLOB SERPL: 1.8 G/DL
ALP SERPL-CCNC: 77 U/L (ref 30–99)
ALP SERPL-CCNC: 77 U/L (ref 30–99)
ALT SERPL-CCNC: 25 U/L (ref 2–50)
ALT SERPL-CCNC: 25 U/L (ref 2–50)
ANION GAP SERPL CALC-SCNC: 9 MMOL/L (ref 0–11.9)
ANION GAP SERPL CALC-SCNC: 9 MMOL/L (ref 0–11.9)
APPEARANCE UR: CLEAR
AST SERPL-CCNC: 23 U/L (ref 12–45)
AST SERPL-CCNC: 23 U/L (ref 12–45)
BASOPHILS # BLD AUTO: 0.7 % (ref 0–1.8)
BASOPHILS # BLD AUTO: 0.9 % (ref 0–1.8)
BASOPHILS # BLD: 0.03 K/UL (ref 0–0.12)
BASOPHILS # BLD: 0.04 K/UL (ref 0–0.12)
BILIRUB SERPL-MCNC: 0.6 MG/DL (ref 0.1–1.5)
BILIRUB SERPL-MCNC: 0.6 MG/DL (ref 0.1–1.5)
BILIRUB UR QL STRIP.AUTO: NEGATIVE
BUN SERPL-MCNC: 47 MG/DL (ref 8–22)
BUN SERPL-MCNC: 48 MG/DL (ref 8–22)
CALCIUM SERPL-MCNC: 8.7 MG/DL (ref 8.5–10.5)
CALCIUM SERPL-MCNC: 8.9 MG/DL (ref 8.5–10.5)
CHLORIDE SERPL-SCNC: 105 MMOL/L (ref 96–112)
CHLORIDE SERPL-SCNC: 105 MMOL/L (ref 96–112)
CO2 SERPL-SCNC: 26 MMOL/L (ref 20–33)
CO2 SERPL-SCNC: 27 MMOL/L (ref 20–33)
COLOR UR: YELLOW
CREAT SERPL-MCNC: 2.01 MG/DL (ref 0.5–1.4)
CREAT SERPL-MCNC: 2.01 MG/DL (ref 0.5–1.4)
CREAT UR-MCNC: 64.3 MG/DL
EOSINOPHIL # BLD AUTO: 0.04 K/UL (ref 0–0.51)
EOSINOPHIL # BLD AUTO: 0.05 K/UL (ref 0–0.51)
EOSINOPHIL NFR BLD: 0.9 % (ref 0–6.9)
EOSINOPHIL NFR BLD: 1.2 % (ref 0–6.9)
ERYTHROCYTE [DISTWIDTH] IN BLOOD BY AUTOMATED COUNT: 50.5 FL (ref 35.9–50)
ERYTHROCYTE [DISTWIDTH] IN BLOOD BY AUTOMATED COUNT: 51.1 FL (ref 35.9–50)
EST. AVERAGE GLUCOSE BLD GHB EST-MCNC: 154 MG/DL
EST. AVERAGE GLUCOSE BLD GHB EST-MCNC: 154 MG/DL
GLOBULIN SER CALC-MCNC: 2.4 G/DL (ref 1.9–3.5)
GLOBULIN SER CALC-MCNC: 2.5 G/DL (ref 1.9–3.5)
GLUCOSE SERPL-MCNC: 91 MG/DL (ref 65–99)
GLUCOSE SERPL-MCNC: 92 MG/DL (ref 65–99)
GLUCOSE UR STRIP.AUTO-MCNC: NEGATIVE MG/DL
HBA1C MFR BLD: 7 % (ref 0–5.6)
HBA1C MFR BLD: 7 % (ref 0–5.6)
HCT VFR BLD AUTO: 46.1 % (ref 42–52)
HCT VFR BLD AUTO: 46.9 % (ref 42–52)
HGB BLD-MCNC: 14.3 G/DL (ref 14–18)
HGB BLD-MCNC: 14.4 G/DL (ref 14–18)
IMM GRANULOCYTES # BLD AUTO: 0.02 K/UL (ref 0–0.11)
IMM GRANULOCYTES # BLD AUTO: 0.02 K/UL (ref 0–0.11)
IMM GRANULOCYTES NFR BLD AUTO: 0.5 % (ref 0–0.9)
IMM GRANULOCYTES NFR BLD AUTO: 0.5 % (ref 0–0.9)
KETONES UR STRIP.AUTO-MCNC: NEGATIVE MG/DL
LEUKOCYTE ESTERASE UR QL STRIP.AUTO: NEGATIVE
LYMPHOCYTES # BLD AUTO: 0.65 K/UL (ref 1–4.8)
LYMPHOCYTES # BLD AUTO: 0.66 K/UL (ref 1–4.8)
LYMPHOCYTES NFR BLD: 14.9 % (ref 22–41)
LYMPHOCYTES NFR BLD: 15.4 % (ref 22–41)
MAGNESIUM SERPL-MCNC: 1.9 MG/DL (ref 1.5–2.5)
MCH RBC QN AUTO: 28.9 PG (ref 27–33)
MCH RBC QN AUTO: 29.3 PG (ref 27–33)
MCHC RBC AUTO-ENTMCNC: 30.5 G/DL (ref 33.7–35.3)
MCHC RBC AUTO-ENTMCNC: 31.2 G/DL (ref 33.7–35.3)
MCV RBC AUTO: 93.9 FL (ref 81.4–97.8)
MCV RBC AUTO: 94.7 FL (ref 81.4–97.8)
MICRO URNS: NORMAL
MONOCYTES # BLD AUTO: 0.48 K/UL (ref 0–0.85)
MONOCYTES # BLD AUTO: 0.54 K/UL (ref 0–0.85)
MONOCYTES NFR BLD AUTO: 11.4 % (ref 0–13.4)
MONOCYTES NFR BLD AUTO: 12.2 % (ref 0–13.4)
NEUTROPHILS # BLD AUTO: 2.99 K/UL (ref 1.82–7.42)
NEUTROPHILS # BLD AUTO: 3.13 K/UL (ref 1.82–7.42)
NEUTROPHILS NFR BLD: 70.6 % (ref 44–72)
NEUTROPHILS NFR BLD: 70.8 % (ref 44–72)
NITRITE UR QL STRIP.AUTO: NEGATIVE
NRBC # BLD AUTO: 0 K/UL
NRBC # BLD AUTO: 0 K/UL
NRBC BLD-RTO: 0 /100 WBC
NRBC BLD-RTO: 0 /100 WBC
PH UR STRIP.AUTO: 6 [PH] (ref 5–8)
PHOSPHATE SERPL-MCNC: 3 MG/DL (ref 2.5–4.5)
PLATELET # BLD AUTO: 87 K/UL (ref 164–446)
PLATELET # BLD AUTO: 87 K/UL (ref 164–446)
PMV BLD AUTO: 12.6 FL (ref 9–12.9)
PMV BLD AUTO: 12.7 FL (ref 9–12.9)
POTASSIUM SERPL-SCNC: 4.4 MMOL/L (ref 3.6–5.5)
POTASSIUM SERPL-SCNC: 4.4 MMOL/L (ref 3.6–5.5)
PROT SERPL-MCNC: 6.6 G/DL (ref 6–8.2)
PROT SERPL-MCNC: 6.6 G/DL (ref 6–8.2)
PROT UR QL STRIP: NEGATIVE MG/DL
PROT UR-MCNC: 4.5 MG/DL (ref 0–15)
PROT/CREAT UR: 70 MG/G (ref 15–68)
PTH-INTACT SERPL-MCNC: 105.7 PG/ML (ref 14–72)
RBC # BLD AUTO: 4.91 M/UL (ref 4.7–6.1)
RBC # BLD AUTO: 4.95 M/UL (ref 4.7–6.1)
RBC UR QL AUTO: NEGATIVE
SODIUM SERPL-SCNC: 140 MMOL/L (ref 135–145)
SODIUM SERPL-SCNC: 141 MMOL/L (ref 135–145)
SP GR UR STRIP.AUTO: 1.01
URATE SERPL-MCNC: 8 MG/DL (ref 2.5–8.3)
UROBILINOGEN UR STRIP.AUTO-MCNC: 0.2 MG/DL
WBC # BLD AUTO: 4.2 K/UL (ref 4.8–10.8)
WBC # BLD AUTO: 4.4 K/UL (ref 4.8–10.8)

## 2019-11-25 PROCEDURE — 84156 ASSAY OF PROTEIN URINE: CPT

## 2019-11-25 PROCEDURE — 84403 ASSAY OF TOTAL TESTOSTERONE: CPT

## 2019-11-25 PROCEDURE — 85025 COMPLETE CBC W/AUTO DIFF WBC: CPT

## 2019-11-25 PROCEDURE — 85025 COMPLETE CBC W/AUTO DIFF WBC: CPT | Mod: 91

## 2019-11-25 PROCEDURE — 81003 URINALYSIS AUTO W/O SCOPE: CPT

## 2019-11-25 PROCEDURE — 82570 ASSAY OF URINE CREATININE: CPT

## 2019-11-25 PROCEDURE — 80197 ASSAY OF TACROLIMUS: CPT

## 2019-11-25 PROCEDURE — 83970 ASSAY OF PARATHORMONE: CPT

## 2019-11-25 PROCEDURE — 36415 COLL VENOUS BLD VENIPUNCTURE: CPT

## 2019-11-25 PROCEDURE — 80053 COMPREHEN METABOLIC PANEL: CPT

## 2019-11-25 PROCEDURE — 83036 HEMOGLOBIN GLYCOSYLATED A1C: CPT

## 2019-11-25 PROCEDURE — 83036 HEMOGLOBIN GLYCOSYLATED A1C: CPT | Mod: 91

## 2019-11-25 PROCEDURE — 82306 VITAMIN D 25 HYDROXY: CPT

## 2019-11-25 PROCEDURE — 80053 COMPREHEN METABOLIC PANEL: CPT | Mod: 91

## 2019-11-25 PROCEDURE — 84270 ASSAY OF SEX HORMONE GLOBUL: CPT

## 2019-11-25 PROCEDURE — 84100 ASSAY OF PHOSPHORUS: CPT

## 2019-11-25 PROCEDURE — 84550 ASSAY OF BLOOD/URIC ACID: CPT

## 2019-11-25 PROCEDURE — 83735 ASSAY OF MAGNESIUM: CPT

## 2019-11-25 PROCEDURE — 84402 ASSAY OF FREE TESTOSTERONE: CPT

## 2019-11-26 LAB
SHBG SERPL-SCNC: 30 NMOL/L (ref 11–80)
TACROLIMUS BLD-MCNC: 6.7 NG/ML
TESTOST FREE MFR SERPL: 1.9 % (ref 1.6–2.9)
TESTOST FREE SERPL-MCNC: 48 PG/ML (ref 47–244)
TESTOST SERPL-MCNC: 257 NG/DL (ref 300–720)

## 2020-01-27 ENCOUNTER — HOSPITAL ENCOUNTER (OUTPATIENT)
Dept: LAB | Facility: MEDICAL CENTER | Age: 65
End: 2020-01-27
Attending: NURSE PRACTITIONER
Payer: COMMERCIAL

## 2020-01-27 LAB
ALBUMIN SERPL BCP-MCNC: 4.2 G/DL (ref 3.2–4.9)
ALBUMIN SERPL BCP-MCNC: 4.4 G/DL (ref 3.2–4.9)
ALBUMIN/GLOB SERPL: 1.8 G/DL
ALBUMIN/GLOB SERPL: 2.1 G/DL
ALP SERPL-CCNC: 65 U/L (ref 30–99)
ALP SERPL-CCNC: 68 U/L (ref 30–99)
ALT SERPL-CCNC: 21 U/L (ref 2–50)
ALT SERPL-CCNC: 22 U/L (ref 2–50)
ANION GAP SERPL CALC-SCNC: 8 MMOL/L (ref 0–11.9)
ANION GAP SERPL CALC-SCNC: 8 MMOL/L (ref 0–11.9)
APPEARANCE UR: CLEAR
AST SERPL-CCNC: 21 U/L (ref 12–45)
AST SERPL-CCNC: 21 U/L (ref 12–45)
BASOPHILS # BLD AUTO: 1.2 % (ref 0–1.8)
BASOPHILS # BLD AUTO: 1.2 % (ref 0–1.8)
BASOPHILS # BLD: 0.05 K/UL (ref 0–0.12)
BASOPHILS # BLD: 0.05 K/UL (ref 0–0.12)
BILIRUB SERPL-MCNC: 0.7 MG/DL (ref 0.1–1.5)
BILIRUB SERPL-MCNC: 0.7 MG/DL (ref 0.1–1.5)
BILIRUB UR QL STRIP.AUTO: NEGATIVE
BUN SERPL-MCNC: 45 MG/DL (ref 8–22)
BUN SERPL-MCNC: 45 MG/DL (ref 8–22)
CALCIUM SERPL-MCNC: 9.3 MG/DL (ref 8.5–10.5)
CALCIUM SERPL-MCNC: 9.3 MG/DL (ref 8.5–10.5)
CHLORIDE SERPL-SCNC: 105 MMOL/L (ref 96–112)
CHLORIDE SERPL-SCNC: 105 MMOL/L (ref 96–112)
CHOLEST SERPL-MCNC: 122 MG/DL (ref 100–199)
CO2 SERPL-SCNC: 27 MMOL/L (ref 20–33)
CO2 SERPL-SCNC: 27 MMOL/L (ref 20–33)
COLOR UR: YELLOW
CREAT SERPL-MCNC: 1.99 MG/DL (ref 0.5–1.4)
CREAT SERPL-MCNC: 2.04 MG/DL (ref 0.5–1.4)
CREAT UR-MCNC: 64.4 MG/DL
EOSINOPHIL # BLD AUTO: 0.06 K/UL (ref 0–0.51)
EOSINOPHIL # BLD AUTO: 0.08 K/UL (ref 0–0.51)
EOSINOPHIL NFR BLD: 1.4 % (ref 0–6.9)
EOSINOPHIL NFR BLD: 1.9 % (ref 0–6.9)
ERYTHROCYTE [DISTWIDTH] IN BLOOD BY AUTOMATED COUNT: 49.6 FL (ref 35.9–50)
ERYTHROCYTE [DISTWIDTH] IN BLOOD BY AUTOMATED COUNT: 50.3 FL (ref 35.9–50)
FASTING STATUS PATIENT QL REPORTED: NORMAL
FASTING STATUS PATIENT QL REPORTED: NORMAL
FOLATE SERPL-MCNC: 23.7 NG/ML
GLOBULIN SER CALC-MCNC: 2.1 G/DL (ref 1.9–3.5)
GLOBULIN SER CALC-MCNC: 2.4 G/DL (ref 1.9–3.5)
GLUCOSE SERPL-MCNC: 84 MG/DL (ref 65–99)
GLUCOSE SERPL-MCNC: 89 MG/DL (ref 65–99)
GLUCOSE UR STRIP.AUTO-MCNC: NEGATIVE MG/DL
HCT VFR BLD AUTO: 44.9 % (ref 42–52)
HCT VFR BLD AUTO: 44.9 % (ref 42–52)
HDLC SERPL-MCNC: 49 MG/DL
HGB BLD-MCNC: 13.7 G/DL (ref 14–18)
HGB BLD-MCNC: 13.8 G/DL (ref 14–18)
IMM GRANULOCYTES # BLD AUTO: 0.02 K/UL (ref 0–0.11)
IMM GRANULOCYTES # BLD AUTO: 0.03 K/UL (ref 0–0.11)
IMM GRANULOCYTES NFR BLD AUTO: 0.5 % (ref 0–0.9)
IMM GRANULOCYTES NFR BLD AUTO: 0.7 % (ref 0–0.9)
IRON SERPL-MCNC: 82 UG/DL (ref 50–180)
KETONES UR STRIP.AUTO-MCNC: NEGATIVE MG/DL
LDLC SERPL CALC-MCNC: 50 MG/DL
LEUKOCYTE ESTERASE UR QL STRIP.AUTO: NEGATIVE
LYMPHOCYTES # BLD AUTO: 0.76 K/UL (ref 1–4.8)
LYMPHOCYTES # BLD AUTO: 0.83 K/UL (ref 1–4.8)
LYMPHOCYTES NFR BLD: 17.7 % (ref 22–41)
LYMPHOCYTES NFR BLD: 19.3 % (ref 22–41)
MAGNESIUM SERPL-MCNC: 1.8 MG/DL (ref 1.5–2.5)
MCH RBC QN AUTO: 28.8 PG (ref 27–33)
MCH RBC QN AUTO: 28.9 PG (ref 27–33)
MCHC RBC AUTO-ENTMCNC: 30.5 G/DL (ref 33.7–35.3)
MCHC RBC AUTO-ENTMCNC: 30.7 G/DL (ref 33.7–35.3)
MCV RBC AUTO: 93.9 FL (ref 81.4–97.8)
MCV RBC AUTO: 94.3 FL (ref 81.4–97.8)
MICRO URNS: NORMAL
MONOCYTES # BLD AUTO: 0.48 K/UL (ref 0–0.85)
MONOCYTES # BLD AUTO: 0.49 K/UL (ref 0–0.85)
MONOCYTES NFR BLD AUTO: 11.2 % (ref 0–13.4)
MONOCYTES NFR BLD AUTO: 11.4 % (ref 0–13.4)
NEUTROPHILS # BLD AUTO: 2.82 K/UL (ref 1.82–7.42)
NEUTROPHILS # BLD AUTO: 2.91 K/UL (ref 1.82–7.42)
NEUTROPHILS NFR BLD: 65.7 % (ref 44–72)
NEUTROPHILS NFR BLD: 67.8 % (ref 44–72)
NITRITE UR QL STRIP.AUTO: NEGATIVE
NRBC # BLD AUTO: 0 K/UL
NRBC # BLD AUTO: 0 K/UL
NRBC BLD-RTO: 0 /100 WBC
NRBC BLD-RTO: 0 /100 WBC
PH UR STRIP.AUTO: 6 [PH] (ref 5–8)
PHOSPHATE SERPL-MCNC: 3.1 MG/DL (ref 2.5–4.5)
PLATELET # BLD AUTO: 90 K/UL (ref 164–446)
PLATELET # BLD AUTO: 97 K/UL (ref 164–446)
PMV BLD AUTO: 11.4 FL (ref 9–12.9)
PMV BLD AUTO: 12.6 FL (ref 9–12.9)
POTASSIUM SERPL-SCNC: 4.5 MMOL/L (ref 3.6–5.5)
POTASSIUM SERPL-SCNC: 4.5 MMOL/L (ref 3.6–5.5)
PROT SERPL-MCNC: 6.5 G/DL (ref 6–8.2)
PROT SERPL-MCNC: 6.6 G/DL (ref 6–8.2)
PROT UR QL STRIP: NEGATIVE MG/DL
PROT UR-MCNC: 6 MG/DL (ref 0–15)
PROT/CREAT UR: 93 MG/G (ref 15–68)
RBC # BLD AUTO: 4.76 M/UL (ref 4.7–6.1)
RBC # BLD AUTO: 4.78 M/UL (ref 4.7–6.1)
RBC UR QL AUTO: NEGATIVE
SODIUM SERPL-SCNC: 140 MMOL/L (ref 135–145)
SODIUM SERPL-SCNC: 140 MMOL/L (ref 135–145)
SP GR UR STRIP.AUTO: 1.02
TRIGL SERPL-MCNC: 115 MG/DL (ref 0–149)
UROBILINOGEN UR STRIP.AUTO-MCNC: 0.2 MG/DL
VIT B12 SERPL-MCNC: 272 PG/ML (ref 211–911)
WBC # BLD AUTO: 4.3 K/UL (ref 4.8–10.8)
WBC # BLD AUTO: 4.3 K/UL (ref 4.8–10.8)

## 2020-01-27 PROCEDURE — 87086 URINE CULTURE/COLONY COUNT: CPT

## 2020-01-27 PROCEDURE — 84100 ASSAY OF PHOSPHORUS: CPT

## 2020-01-27 PROCEDURE — 83735 ASSAY OF MAGNESIUM: CPT

## 2020-01-27 PROCEDURE — 82746 ASSAY OF FOLIC ACID SERUM: CPT

## 2020-01-27 PROCEDURE — 80053 COMPREHEN METABOLIC PANEL: CPT | Mod: 91

## 2020-01-27 PROCEDURE — 84156 ASSAY OF PROTEIN URINE: CPT

## 2020-01-27 PROCEDURE — 80197 ASSAY OF TACROLIMUS: CPT

## 2020-01-27 PROCEDURE — 82570 ASSAY OF URINE CREATININE: CPT

## 2020-01-27 PROCEDURE — 80061 LIPID PANEL: CPT

## 2020-01-27 PROCEDURE — 80053 COMPREHEN METABOLIC PANEL: CPT

## 2020-01-27 PROCEDURE — 83540 ASSAY OF IRON: CPT

## 2020-01-27 PROCEDURE — 85025 COMPLETE CBC W/AUTO DIFF WBC: CPT

## 2020-01-27 PROCEDURE — 85025 COMPLETE CBC W/AUTO DIFF WBC: CPT | Mod: 91

## 2020-01-27 PROCEDURE — 36415 COLL VENOUS BLD VENIPUNCTURE: CPT

## 2020-01-27 PROCEDURE — 82607 VITAMIN B-12: CPT

## 2020-01-27 PROCEDURE — 81003 URINALYSIS AUTO W/O SCOPE: CPT

## 2020-01-28 LAB — TACROLIMUS BLD-MCNC: 6.9 NG/ML

## 2020-01-29 LAB
BACTERIA UR CULT: NORMAL
SIGNIFICANT IND 70042: NORMAL
SITE SITE: NORMAL
SOURCE SOURCE: NORMAL

## 2020-04-24 ENCOUNTER — HOSPITAL ENCOUNTER (OUTPATIENT)
Dept: LAB | Facility: MEDICAL CENTER | Age: 65
End: 2020-04-24
Attending: INTERNAL MEDICINE
Payer: COMMERCIAL

## 2020-04-24 LAB
25(OH)D3 SERPL-MCNC: 40 NG/ML (ref 30–100)
ALBUMIN SERPL BCP-MCNC: 4.2 G/DL (ref 3.2–4.9)
ALBUMIN/GLOB SERPL: 2 G/DL
ALP SERPL-CCNC: 84 U/L (ref 30–99)
ALT SERPL-CCNC: 21 U/L (ref 2–50)
ANION GAP SERPL CALC-SCNC: 11 MMOL/L (ref 7–16)
APPEARANCE UR: CLEAR
AST SERPL-CCNC: 21 U/L (ref 12–45)
BASOPHILS # BLD AUTO: 0.7 % (ref 0–1.8)
BASOPHILS # BLD: 0.03 K/UL (ref 0–0.12)
BILIRUB SERPL-MCNC: 0.7 MG/DL (ref 0.1–1.5)
BILIRUB UR QL STRIP.AUTO: NEGATIVE
BUN SERPL-MCNC: 35 MG/DL (ref 8–22)
CALCIUM SERPL-MCNC: 8.9 MG/DL (ref 8.5–10.5)
CHLORIDE SERPL-SCNC: 104 MMOL/L (ref 96–112)
CO2 SERPL-SCNC: 25 MMOL/L (ref 20–33)
COLOR UR: YELLOW
CREAT SERPL-MCNC: 1.7 MG/DL (ref 0.5–1.4)
CREAT UR-MCNC: 79.3 MG/DL
EOSINOPHIL # BLD AUTO: 0.05 K/UL (ref 0–0.51)
EOSINOPHIL NFR BLD: 1.2 % (ref 0–6.9)
ERYTHROCYTE [DISTWIDTH] IN BLOOD BY AUTOMATED COUNT: 48.1 FL (ref 35.9–50)
EST. AVERAGE GLUCOSE BLD GHB EST-MCNC: 137 MG/DL
GLOBULIN SER CALC-MCNC: 2.1 G/DL (ref 1.9–3.5)
GLUCOSE SERPL-MCNC: 95 MG/DL (ref 65–99)
GLUCOSE UR STRIP.AUTO-MCNC: NEGATIVE MG/DL
HBA1C MFR BLD: 6.4 % (ref 0–5.6)
HCT VFR BLD AUTO: 43 % (ref 42–52)
HGB BLD-MCNC: 13.6 G/DL (ref 14–18)
IMM GRANULOCYTES # BLD AUTO: 0.02 K/UL (ref 0–0.11)
IMM GRANULOCYTES NFR BLD AUTO: 0.5 % (ref 0–0.9)
KETONES UR STRIP.AUTO-MCNC: NEGATIVE MG/DL
LEUKOCYTE ESTERASE UR QL STRIP.AUTO: NEGATIVE
LYMPHOCYTES # BLD AUTO: 0.7 K/UL (ref 1–4.8)
LYMPHOCYTES NFR BLD: 16.9 % (ref 22–41)
MAGNESIUM SERPL-MCNC: 1.8 MG/DL (ref 1.5–2.5)
MCH RBC QN AUTO: 29.4 PG (ref 27–33)
MCHC RBC AUTO-ENTMCNC: 31.6 G/DL (ref 33.7–35.3)
MCV RBC AUTO: 93.1 FL (ref 81.4–97.8)
MICRO URNS: NORMAL
MONOCYTES # BLD AUTO: 0.55 K/UL (ref 0–0.85)
MONOCYTES NFR BLD AUTO: 13.3 % (ref 0–13.4)
NEUTROPHILS # BLD AUTO: 2.79 K/UL (ref 1.82–7.42)
NEUTROPHILS NFR BLD: 67.4 % (ref 44–72)
NITRITE UR QL STRIP.AUTO: NEGATIVE
NRBC # BLD AUTO: 0 K/UL
NRBC BLD-RTO: 0 /100 WBC
PH UR STRIP.AUTO: 6 [PH] (ref 5–8)
PHOSPHATE SERPL-MCNC: 3.2 MG/DL (ref 2.5–4.5)
PLATELET # BLD AUTO: 98 K/UL (ref 164–446)
PMV BLD AUTO: 12 FL (ref 9–12.9)
POTASSIUM SERPL-SCNC: 4.2 MMOL/L (ref 3.6–5.5)
PROT SERPL-MCNC: 6.3 G/DL (ref 6–8.2)
PROT UR QL STRIP: NEGATIVE MG/DL
PROT UR-MCNC: 5 MG/DL (ref 0–15)
PROT/CREAT UR: 63 MG/G (ref 15–68)
RBC # BLD AUTO: 4.62 M/UL (ref 4.7–6.1)
RBC UR QL AUTO: NEGATIVE
SODIUM SERPL-SCNC: 140 MMOL/L (ref 135–145)
SP GR UR STRIP.AUTO: 1.02
URATE SERPL-MCNC: 6.5 MG/DL (ref 2.5–8.3)
UROBILINOGEN UR STRIP.AUTO-MCNC: 0.2 MG/DL
WBC # BLD AUTO: 4.1 K/UL (ref 4.8–10.8)

## 2020-04-24 PROCEDURE — 81003 URINALYSIS AUTO W/O SCOPE: CPT

## 2020-04-24 PROCEDURE — 84550 ASSAY OF BLOOD/URIC ACID: CPT

## 2020-04-24 PROCEDURE — 80053 COMPREHEN METABOLIC PANEL: CPT

## 2020-04-24 PROCEDURE — 84100 ASSAY OF PHOSPHORUS: CPT

## 2020-04-24 PROCEDURE — 85025 COMPLETE CBC W/AUTO DIFF WBC: CPT

## 2020-04-24 PROCEDURE — 80197 ASSAY OF TACROLIMUS: CPT

## 2020-04-24 PROCEDURE — 36415 COLL VENOUS BLD VENIPUNCTURE: CPT

## 2020-04-24 PROCEDURE — 83036 HEMOGLOBIN GLYCOSYLATED A1C: CPT

## 2020-04-24 PROCEDURE — 83735 ASSAY OF MAGNESIUM: CPT

## 2020-04-24 PROCEDURE — 82306 VITAMIN D 25 HYDROXY: CPT

## 2020-04-26 LAB — TACROLIMUS BLD-MCNC: 5.6 NG/ML

## 2020-07-29 ENCOUNTER — HOSPITAL ENCOUNTER (OUTPATIENT)
Dept: LAB | Facility: MEDICAL CENTER | Age: 65
End: 2020-07-29
Attending: INTERNAL MEDICINE
Payer: COMMERCIAL

## 2020-07-29 LAB
ALBUMIN SERPL BCP-MCNC: 4.3 G/DL (ref 3.2–4.9)
ALBUMIN/GLOB SERPL: 2.5 G/DL
ALP SERPL-CCNC: 70 U/L (ref 30–99)
ALT SERPL-CCNC: 18 U/L (ref 2–50)
ANION GAP SERPL CALC-SCNC: 14 MMOL/L (ref 7–16)
APPEARANCE UR: CLEAR
AST SERPL-CCNC: 15 U/L (ref 12–45)
BASOPHILS # BLD AUTO: 0.6 % (ref 0–1.8)
BASOPHILS # BLD: 0.03 K/UL (ref 0–0.12)
BILIRUB SERPL-MCNC: 0.8 MG/DL (ref 0.1–1.5)
BILIRUB UR QL STRIP.AUTO: NEGATIVE
BUN SERPL-MCNC: 36 MG/DL (ref 8–22)
CALCIUM SERPL-MCNC: 9 MG/DL (ref 8.5–10.5)
CHLORIDE SERPL-SCNC: 103 MMOL/L (ref 96–112)
CO2 SERPL-SCNC: 23 MMOL/L (ref 20–33)
COLOR UR: YELLOW
CREAT SERPL-MCNC: 1.87 MG/DL (ref 0.5–1.4)
EOSINOPHIL # BLD AUTO: 0.05 K/UL (ref 0–0.51)
EOSINOPHIL NFR BLD: 1 % (ref 0–6.9)
ERYTHROCYTE [DISTWIDTH] IN BLOOD BY AUTOMATED COUNT: 47.6 FL (ref 35.9–50)
GLOBULIN SER CALC-MCNC: 1.7 G/DL (ref 1.9–3.5)
GLUCOSE SERPL-MCNC: 90 MG/DL (ref 65–99)
GLUCOSE UR STRIP.AUTO-MCNC: NEGATIVE MG/DL
HCT VFR BLD AUTO: 42.5 % (ref 42–52)
HGB BLD-MCNC: 13.4 G/DL (ref 14–18)
IMM GRANULOCYTES # BLD AUTO: 0.04 K/UL (ref 0–0.11)
IMM GRANULOCYTES NFR BLD AUTO: 0.8 % (ref 0–0.9)
KETONES UR STRIP.AUTO-MCNC: NEGATIVE MG/DL
LEUKOCYTE ESTERASE UR QL STRIP.AUTO: NEGATIVE
LYMPHOCYTES # BLD AUTO: 0.65 K/UL (ref 1–4.8)
LYMPHOCYTES NFR BLD: 13.1 % (ref 22–41)
MCH RBC QN AUTO: 29.3 PG (ref 27–33)
MCHC RBC AUTO-ENTMCNC: 31.5 G/DL (ref 33.7–35.3)
MCV RBC AUTO: 93 FL (ref 81.4–97.8)
MICRO URNS: NORMAL
MONOCYTES # BLD AUTO: 0.5 K/UL (ref 0–0.85)
MONOCYTES NFR BLD AUTO: 10.1 % (ref 0–13.4)
NEUTROPHILS # BLD AUTO: 3.7 K/UL (ref 1.82–7.42)
NEUTROPHILS NFR BLD: 74.4 % (ref 44–72)
NITRITE UR QL STRIP.AUTO: NEGATIVE
NRBC # BLD AUTO: 0 K/UL
NRBC BLD-RTO: 0 /100 WBC
PH UR STRIP.AUTO: 6 [PH] (ref 5–8)
PLATELET # BLD AUTO: 92 K/UL (ref 164–446)
PMV BLD AUTO: 12.2 FL (ref 9–12.9)
POTASSIUM SERPL-SCNC: 4 MMOL/L (ref 3.6–5.5)
PROT SERPL-MCNC: 6 G/DL (ref 6–8.2)
PROT UR QL STRIP: NEGATIVE MG/DL
RBC # BLD AUTO: 4.57 M/UL (ref 4.7–6.1)
RBC UR QL AUTO: NEGATIVE
SODIUM SERPL-SCNC: 140 MMOL/L (ref 135–145)
SP GR UR STRIP.AUTO: 1.02
UROBILINOGEN UR STRIP.AUTO-MCNC: 0.2 MG/DL
WBC # BLD AUTO: 5 K/UL (ref 4.8–10.8)

## 2020-07-29 PROCEDURE — 81003 URINALYSIS AUTO W/O SCOPE: CPT

## 2020-07-29 PROCEDURE — 85025 COMPLETE CBC W/AUTO DIFF WBC: CPT

## 2020-07-29 PROCEDURE — 80197 ASSAY OF TACROLIMUS: CPT

## 2020-07-29 PROCEDURE — 36415 COLL VENOUS BLD VENIPUNCTURE: CPT

## 2020-07-29 PROCEDURE — 80053 COMPREHEN METABOLIC PANEL: CPT

## 2020-07-31 LAB — TACROLIMUS BLD-MCNC: 6.5 NG/ML

## 2020-08-19 ENCOUNTER — HOSPITAL ENCOUNTER (OUTPATIENT)
Dept: LAB | Facility: MEDICAL CENTER | Age: 65
End: 2020-08-19
Attending: NURSE PRACTITIONER
Payer: COMMERCIAL

## 2020-08-19 LAB
CRP SERPL HS-MCNC: 0.04 MG/DL (ref 0–0.75)
ERYTHROCYTE [SEDIMENTATION RATE] IN BLOOD BY WESTERGREN METHOD: <1 MM/HOUR (ref 0–20)

## 2020-08-19 PROCEDURE — 36415 COLL VENOUS BLD VENIPUNCTURE: CPT

## 2020-08-19 PROCEDURE — 86140 C-REACTIVE PROTEIN: CPT

## 2020-08-19 PROCEDURE — 85652 RBC SED RATE AUTOMATED: CPT

## 2020-10-20 ENCOUNTER — TELEPHONE (OUTPATIENT)
Dept: SCHEDULING | Facility: IMAGING CENTER | Age: 65
End: 2020-10-20

## 2020-10-20 ENCOUNTER — OFFICE VISIT (OUTPATIENT)
Dept: MEDICAL GROUP | Facility: PHYSICIAN GROUP | Age: 65
End: 2020-10-20
Payer: COMMERCIAL

## 2020-10-20 VITALS
HEIGHT: 75 IN | SYSTOLIC BLOOD PRESSURE: 122 MMHG | DIASTOLIC BLOOD PRESSURE: 68 MMHG | OXYGEN SATURATION: 96 % | HEART RATE: 62 BPM | TEMPERATURE: 97.3 F | BODY MASS INDEX: 30.71 KG/M2 | RESPIRATION RATE: 12 BRPM | WEIGHT: 247 LBS

## 2020-10-20 DIAGNOSIS — D69.6 THROMBOCYTOPENIA (HCC): ICD-10-CM

## 2020-10-20 DIAGNOSIS — E78.5 HYPERLIPOPROTEINEMIA: Chronic | ICD-10-CM

## 2020-10-20 DIAGNOSIS — I10 ESSENTIAL HYPERTENSION: Chronic | ICD-10-CM

## 2020-10-20 DIAGNOSIS — Z00.00 GENERAL MEDICAL EXAM: ICD-10-CM

## 2020-10-20 DIAGNOSIS — I48.0 PAF (PAROXYSMAL ATRIAL FIBRILLATION) (HCC): ICD-10-CM

## 2020-10-20 DIAGNOSIS — Z79.899 HIGH RISK MEDICATION USE: ICD-10-CM

## 2020-10-20 DIAGNOSIS — Z23 NEED FOR VACCINATION: ICD-10-CM

## 2020-10-20 DIAGNOSIS — E11.9 TYPE 2 DIABETES MELLITUS WITHOUT COMPLICATION, WITHOUT LONG-TERM CURRENT USE OF INSULIN (HCC): ICD-10-CM

## 2020-10-20 DIAGNOSIS — Z94.0 HISTORY OF RENAL TRANSPLANT: ICD-10-CM

## 2020-10-20 DIAGNOSIS — N18.2 CHRONIC KIDNEY DISEASE, STAGE 2, MILDLY DECREASED GFR: ICD-10-CM

## 2020-10-20 PROBLEM — A41.9 SEPSIS (HCC): Status: RESOLVED | Noted: 2017-09-27 | Resolved: 2020-10-20

## 2020-10-20 PROBLEM — M10.9 GOUT: Status: ACTIVE | Noted: 2018-07-09

## 2020-10-20 PROBLEM — Q61.3 POLYCYSTIC KIDNEY: Status: ACTIVE | Noted: 2018-07-09

## 2020-10-20 PROBLEM — K21.9 GERD (GASTROESOPHAGEAL REFLUX DISEASE): Status: ACTIVE | Noted: 2018-07-09

## 2020-10-20 PROCEDURE — 90662 IIV NO PRSV INCREASED AG IM: CPT | Performed by: PHYSICIAN ASSISTANT

## 2020-10-20 PROCEDURE — 99214 OFFICE O/P EST MOD 30 MIN: CPT | Mod: 25 | Performed by: PHYSICIAN ASSISTANT

## 2020-10-20 PROCEDURE — 90471 IMMUNIZATION ADMIN: CPT | Performed by: PHYSICIAN ASSISTANT

## 2020-10-20 RX ORDER — GLYBURIDE 5 MG/1
10 TABLET ORAL
COMMUNITY
End: 2020-10-20

## 2020-10-20 RX ORDER — GABAPENTIN 300 MG/1
300 CAPSULE ORAL 3 TIMES DAILY
COMMUNITY
End: 2020-11-13

## 2020-10-20 RX ORDER — MULTIVIT-MIN/IRON/FOLIC ACID/K 18-600-40
500 CAPSULE ORAL DAILY
COMMUNITY
End: 2020-11-13

## 2020-10-20 RX ORDER — TACROLIMUS 1 MG/1
1 CAPSULE ORAL
COMMUNITY
End: 2020-10-20

## 2020-10-20 ASSESSMENT — FIBROSIS 4 INDEX: FIB4 SCORE: 2.42

## 2020-10-20 ASSESSMENT — PATIENT HEALTH QUESTIONNAIRE - PHQ9: CLINICAL INTERPRETATION OF PHQ2 SCORE: 0

## 2020-10-20 NOTE — ASSESSMENT & PLAN NOTE
Labs from July 29, 2020 reviewed, CBC showed platelet count of 92, white blood cells within normal limits at 5.0, red blood cells within normal limits at 4.57, hemoglobin slightly low at 13.4 hematocrit 42.5.  MCV level within normal limits.    Most likely steroid induced, prednisone x 10 years, s/p kidney transplant.

## 2020-10-20 NOTE — ASSESSMENT & PLAN NOTE
This is a chronic condition.  Current medications: Pioglitazone 45 mg daily patient is on glyburide 5 mg daily, Tradjenta 5 mg daily    Last A1c:   Lab Results   Component Value Date/Time    HBA1C 6.4 (H) 04/24/2020 0724    AVGLUC 137 04/24/2020 0724     Last Microalb/Cr ratio:   Lab Results   Component Value Date/Time    MICROALBUR 5.7 08/06/2013     Fasting sugars:   Last retinal eye exam: UTD, will schedule soon. Will get me name.   ACEi/ARB? Not currently  Statin? Not anymore.   Aspirin? yes  Concomitant HTN? yes  Nightly foot checks? No changes

## 2020-10-20 NOTE — PROGRESS NOTES
CC:   Chief Complaint   Patient presents with   • Establish Care   • Diabetes          HISTORY OF PRESENT ILLNESS: Patient is a 64 y.o. male established patient who presents today to establish care with me and discuss the following issues:      Health Maintenance: Completed  Due for DM eye exam soon per patient  High dose flu shot today.     Dyslipidemia  This is a chronic condition.   Latest Labs:   Lab Results   Component Value Date/Time    CHOLSTRLTOT 122 01/27/2020 07:15 AM    LDL 50 01/27/2020 07:15 AM    HDL 49 01/27/2020 07:15 AM    TRIGLYCERIDE 115 01/27/2020 07:15 AM      Medications: atorvastatin 80 mg, no longer on medication.    Family History of high cholesterol or heart disease?   Risk calculator: The ASCVD Risk score (Kirksey MATHIEU Jr, et al., 2013) failed to calculate.       Hypertension  This is a chronic condition.  Onset: years ago  Current Meds: Metoprolol SR 25 mg daily  Side effects? none  Associated symptoms:  denies any chest pain, sob, headaches, dizziness/lightheadedness      Type 2 diabetes mellitus without complication, without long-term current use of insulin (HCC)  This is a chronic condition.  Current medications: Pioglitazone 45 mg daily patient is on glyburide 5 mg daily, Tradjenta 5 mg daily    Last A1c:   Lab Results   Component Value Date/Time    HBA1C 6.4 (H) 04/24/2020 0724    AVGLUC 137 04/24/2020 0724     Last Microalb/Cr ratio:   Lab Results   Component Value Date/Time    MICROALBUR 5.7 08/06/2013     Fasting sugars:   Last retinal eye exam: UTD, will schedule soon. Will get me name.   ACEi/ARB? Not currently  Statin? Not anymore.   Aspirin? yes  Concomitant HTN? yes  Nightly foot checks? No changes        Chronic kidney disease, stage 2, mildly decreased GFR  Labs from July 29, 2020 reviewed BUN elevated at 36, creatinine 1.87,  GFR 37.    Nephrologist- Dr. León. S/p right kidney transplant 2010, doing well.     Thrombocytopenia (HCC)  Labs from July 29, 2020 reviewed, CBC  showed platelet count of 92, white blood cells within normal limits at 5.0, red blood cells within normal limits at 4.57, hemoglobin slightly low at 13.4 hematocrit 42.5.  MCV level within normal limits.    Most likely steroid induced, prednisone x 10 years, s/p kidney transplant.     Renal Transplant 9/10/2010 2nd to Polycystic Kidney Disease  History of PCKD- s/p transplant kidney- right kidney. On steroids- prednisone 5 mg daily and tacrolimus, cellcept 500 mg BID.     PAF (paroxysmal atrial fibrillation)  Saw Dr. Estrada in past. On metoprolol SR 25 mg, on baby aspirin. Found A-fib 10 years ago per patient. Hasn't seen cardiology since 2016- reviewed consult. Asymptomatic.       Patient Active Problem List    Diagnosis Date Noted   • CAD (coronary artery disease) 07/08/2013     Priority: Medium   • Hypertension 07/09/2018     Priority: Low   • Thrombocytopenia ([facility]) 10/20/2020   • GERD (gastroesophageal reflux disease) 07/09/2018   • Polycystic kidney 07/09/2018   • Gout 07/09/2018   • Anemia of chronic disease 09/28/2017   • Pneumonia 09/27/2017   • SANKET (acute kidney injury) ([facility]) 09/27/2017   • Weakness 03/04/2014   • Type 2 diabetes mellitus without complication, without long-term current use of insulin ([facility]) 01/09/2014   • S/P insertion of non-drug eluting coronary artery stent 07/08/2013   • PAF (paroxysmal atrial fibrillation) ([facility]) 07/08/2013   • Dyslipidemia    • Myocardial infarct ([facility]) 06/26/2013   • Renal Transplant 9/10/2010 2nd to Polycystic Kidney Disease 06/26/2013   • Chronic kidney disease, stage 2, mildly decreased GFR 06/26/2013   • Diabetes mellitus ([facility]) 06/26/2013      Allergies:Doxycycline    Current Outpatient Medications   Medication Sig Dispense Refill   • metoprolol (LOPRESSOR) 25 MG Tab Take 25 mg by mouth every day.     • Cholecalciferol 1.25 MG (29674 UT) Tab Take  by mouth.     • Ascorbic Acid (VITAMIN C) 500 MG Cap Take 500 mg by mouth every day.     • aspirin EC (ECOTRIN) 81 MG  "Tablet Delayed Response Take 81 mg by mouth every day.     • gabapentin (NEURONTIN) 300 MG Cap Take 300 mg by mouth 4 times a day.     • ondansetron (ZOFRAN ODT) 4 MG TABLET DISPERSIBLE Take 1 Tab by mouth every 6 hours as needed. 15 Tab 0   • omeprazole (PRILOSEC) 20 MG Tablet Delayed Response delayed-release tablet Take 20 mg by mouth every day.     • pioglitazone (ACTOS) 45 MG Tab Take 45 mg by mouth every day.     • mycophenolate (CELLCEPT) 250 MG Cap Take 500 mg by mouth 2 times a day.     • metoprolol SR (TOPROL XL) 25 MG TABLET SR 24 HR TAKE ONE TABLET BY MOUTH EVERY DAY 90 Tab 3   • linagliptin (TRADJENTA) 5 MG TABS tablet Take 1 Tab by mouth every day. 30 Tab 11   • tacrolimus (PROGRAF) 1 MG CAPS Take 1 mg by mouth 2 times a day.     • predniSONE (DELTASONE) 5 MG TABS Take 5 mg by mouth every day. Take with food      • glyBURIDE (DIABETA) 5 MG TABS Take 10 mg by mouth 2 times a day, with meals.       No current facility-administered medications for this visit.        Social History     Tobacco Use   • Smoking status: Never Smoker   • Smokeless tobacco: Never Used   Substance Use Topics   • Alcohol use: No   • Drug use: No     Social History     Social History Narrative   • Not on file       History reviewed. No pertinent family history.    Review of Systems:    Constitutional: No Fevers, Chills  Eyes: No vision changes  ENT: No hearing changes  Resp: No Shortness of breath  CV: No Chest pain  GI: No Nausea/Vomiting  MSK: No weakness  Skin: No rashes  Neuro: No Headaches  Psych: No Suicidal ideations    All remaining systems reviewed and found to be negative, except as stated above.    Exam:    /68   Pulse 62   Temp 36.3 °C (97.3 °F) (Temporal)   Resp 12   Ht 1.892 m (6' 2.5\")   Wt 112 kg (247 lb)   SpO2 96%  Body mass index is 31.29 kg/m².    General:  Well nourished, well developed male in NAD  HENT: Atraumatic, normocephalic  EYES: Extraocular movements intact  NECK: Supple, FROM  CHEST: No " deformities, Equal chest expansion  RESP: Unlabored, no stridor or audible wheeze  HEART: Regular Rate and rhythm.   ABD: Soft, Nontender, Non-Distended  Extremities: No Clubbing, Cyanosis, or Edema  Skin: Warm/dry, without rashes  Neuro: A/O x 4, due to COVID-19- did not have patient remove face mask to test cranial nerves.  Motor/sensory grossly intact  Psych: Normal behavior, normal affect      Lab review:  Labs are reviewed and discussed with a patient  Lab Results   Component Value Date/Time    CHOLSTRLTOT 122 01/27/2020 07:15 AM    LDL 50 01/27/2020 07:15 AM    HDL 49 01/27/2020 07:15 AM    TRIGLYCERIDE 115 01/27/2020 07:15 AM       Lab Results   Component Value Date/Time    SODIUM 140 07/29/2020 06:58 AM    POTASSIUM 4.0 07/29/2020 06:58 AM    CHLORIDE 103 07/29/2020 06:58 AM    CO2 23 07/29/2020 06:58 AM    GLUCOSE 90 07/29/2020 06:58 AM    BUN 36 (H) 07/29/2020 06:58 AM    CREATININE 1.87 (H) 07/29/2020 06:58 AM    CREATININE 2.4 (H) 12/18/2006 06:20 AM     Lab Results   Component Value Date/Time    ALKPHOSPHAT 70 07/29/2020 06:58 AM    ASTSGOT 15 07/29/2020 06:58 AM    ALTSGPT 18 07/29/2020 06:58 AM    TBILIRUBIN 0.8 07/29/2020 06:58 AM      Lab Results   Component Value Date/Time    HBA1C 6.4 (H) 04/24/2020 07:24 AM    HBA1C 7.0 (H) 11/25/2019 07:53 AM    HBA1C 7.0 (H) 11/25/2019 07:46 AM     No results found for: TSH  No results found for: FREET4    Lab Results   Component Value Date/Time    WBC 5.0 07/29/2020 06:58 AM    RBC 4.57 (L) 07/29/2020 06:58 AM    HEMOGLOBIN 13.4 (L) 07/29/2020 06:58 AM    HEMATOCRIT 42.5 07/29/2020 06:58 AM    MCV 93.0 07/29/2020 06:58 AM    MCH 29.3 07/29/2020 06:58 AM    MCHC 31.5 (L) 07/29/2020 06:58 AM    MPV 12.2 07/29/2020 06:58 AM    NEUTSPOLYS 74.40 (H) 07/29/2020 06:58 AM    LYMPHOCYTES 13.10 (L) 07/29/2020 06:58 AM    MONOCYTES 10.10 07/29/2020 06:58 AM    EOSINOPHILS 1.00 07/29/2020 06:58 AM    BASOPHILS 0.60 07/29/2020 06:58 AM    ANISOCYTOSIS 1+ 09/28/2017 04:09  AM          Assessment/Plan:  1. Dyslipidemia  - Lipid Profile; Future  Chronic condition, due for updated labs, patient no longer on atorvastatin.  Does have a history of CAD with a stent placement, will make sure is at goal.  2. Essential hypertension  Chronic condition.  Stable.  Continue metoprolol 25 mg daily.  3. Type 2 diabetes mellitus without complication, without long-term current use of insulin (HCC)  - HEMOGLOBIN A1C; Future  Type 2 diabetes induced by long-term use of steroids, status post right kidney transplant 2010.  Hemoglobin A1c very controlled.  Continue glyburide 5 mg, Tradjenta 5 mg and pioglitazone 45 mg daily.  We will get updated hemoglobin A1c with next set of labs.  4. Chronic kidney disease, stage 2, mildly decreased GFR  Chronic condition being followed by Dr. León, status post right kidney transplant.  5. Thrombocytopenia (HCC)  Thrombocytopenia, stable, followed by nephrology.      6. Need for vaccination  - Influenza Vaccine, High Dose (65+ Only)    7. Renal Transplant 9/10/2010 2nd to Polycystic Kidney Disease    8. PAF (paroxysmal atrial fibrillation) (HCC)  Rate controlled with a full set 62 today, patient is asymptomatic, blood pressure target, is on a baby aspirin as well.  Has not seen Dr. Zarate since 2016 discussed referral to cardiology today, patient declines at this time.  9. General medical exam  - TSH; Future    10. High risk medication use  - TSH; Future    Other orders  - metoprolol (LOPRESSOR) 25 MG Tab; Take 25 mg by mouth every day.  - Cholecalciferol 1.25 MG (07387 UT) Tab; Take  by mouth.  - Ascorbic Acid (VITAMIN C) 500 MG Cap; Take 500 mg by mouth every day.  - aspirin EC (ECOTRIN) 81 MG Tablet Delayed Response; Take 81 mg by mouth every day.  - gabapentin (NEURONTIN) 300 MG Cap; Take 300 mg by mouth 4 times a day.       Follow-up: Return in about 3 months (around 1/20/2021) for F/U, sooner pending labs..    Please note that this dictation was created using  voice recognition software. I have made every reasonable attempt to correct obvious errors, but I expect that there are errors of grammar and possibly content that I did not discover before finalizing the note.

## 2020-10-20 NOTE — LETTER
[facility]  Delmi Curiel P.A.-C.  [address]  [place] [address]  Fax: [phone]   Authorization for Release/Disclosure of   Protected Health Information   Name: CIARA FORRESTER : 1955 SSN: [id number]   Address: [address] [place] [address] [address] [place] [address] Phone:    [phone] (home)    I authorize the entity listed below to release/disclose the PHI below to:   [facility]/Delmi Curiel P.A.-C. and Delmi Curiel P.A.-C.   Provider or Entity Name:  Dr. León- Nephrology   Address   City, [facility], [facility]   Phone:      Fax:     Reason for request: continuity of care   Information to be released:    [  ] LAST COLONOSCOPY,  including any PATH REPORT and follow-up  [  ] LAST FIT/COLOGUARD RESULT [  ] LAST DEXA  [  ] LAST MAMMOGRAM  [  ] LAST PAP  [  ] LAST LABS [  ] RETINA EXAM REPORT  [  ] IMMUNIZATION RECORDS  [ x ] Release all info      [  ] Check here and initial the line next to each item to release ALL health information INCLUDING  _____ Care and treatment for drug and / or alcohol abuse  _____ HIV testing, infection status, or AIDS  _____ Genetic Testing    DATES OF SERVICE OR TIME PERIOD TO BE DISCLOSED: _____________  I understand and acknowledge that:  * This Authorization may be revoked at any time by you in writing, except if your health information has already been used or disclosed.  * Your health information that will be used or disclosed as a result of you signing this authorization could be re-disclosed by the recipient. If this occurs, your re-disclosed health information may no longer be protected by State or Federal laws.  * You may refuse to sign this Authorization. Your refusal will not affect your ability to obtain treatment.  * This Authorization becomes effective upon signing and will  on (date) __________.      If no date is indicated, this Authorization will  one (1) year from the signature date.    Name: Ciara Kevin Agapito    Signature:   Date:          10/20/2020       PLEASE FAX REQUESTED RECORDS BACK TO: [phone]

## 2020-10-20 NOTE — ASSESSMENT & PLAN NOTE
Saw Dr. Estrada in past. On metoprolol SR 25 mg, on baby aspirin. Found A-fib 10 years ago per patient. Hasn't seen cardiology since 2016- reviewed consult. Asymptomatic.

## 2020-10-20 NOTE — ASSESSMENT & PLAN NOTE
Labs from July 29, 2020 reviewed BUN elevated at 36, creatinine 1.87,  GFR 37.    Nephrologist- Dr. León. S/p right kidney transplant 2010, doing well.

## 2020-10-20 NOTE — ASSESSMENT & PLAN NOTE
This is a chronic condition.   Latest Labs:   Lab Results   Component Value Date/Time    CHOLSTRLTOT 122 01/27/2020 07:15 AM    LDL 50 01/27/2020 07:15 AM    HDL 49 01/27/2020 07:15 AM    TRIGLYCERIDE 115 01/27/2020 07:15 AM      Medications: atorvastatin 80 mg, no longer on medication.    Family History of high cholesterol or heart disease?   Risk calculator: The ASCVD Risk score (Ekronfrank MURDOCK Jr, et al., 2013) failed to calculate.

## 2020-10-20 NOTE — ASSESSMENT & PLAN NOTE
History of PCKD- s/p transplant kidney- right kidney. On steroids- prednisone 5 mg daily and tacrolimus, cellcept 500 mg BID.

## 2020-10-22 RX ORDER — NICOTINE POLACRILEX 4 MG/1
20 GUM, CHEWING ORAL DAILY
Qty: 90 TAB | Refills: 0 | Status: SHIPPED | OUTPATIENT
Start: 2020-10-22 | End: 2021-01-20 | Stop reason: SDUPTHER

## 2020-11-03 RX ORDER — GLYBURIDE 5 MG/1
10 TABLET ORAL 2 TIMES DAILY WITH MEALS
Qty: 180 TAB | Refills: 1 | Status: SHIPPED | OUTPATIENT
Start: 2020-11-03 | End: 2020-12-09 | Stop reason: SDUPTHER

## 2020-11-05 ENCOUNTER — TELEPHONE (OUTPATIENT)
Dept: MEDICAL GROUP | Facility: PHYSICIAN GROUP | Age: 65
End: 2020-11-05

## 2020-11-05 ENCOUNTER — OFFICE VISIT (OUTPATIENT)
Dept: URGENT CARE | Facility: PHYSICIAN GROUP | Age: 65
End: 2020-11-05
Payer: COMMERCIAL

## 2020-11-05 VITALS
HEIGHT: 78 IN | RESPIRATION RATE: 16 BRPM | SYSTOLIC BLOOD PRESSURE: 110 MMHG | TEMPERATURE: 98.6 F | BODY MASS INDEX: 28 KG/M2 | HEART RATE: 84 BPM | WEIGHT: 242 LBS | DIASTOLIC BLOOD PRESSURE: 78 MMHG | OXYGEN SATURATION: 94 %

## 2020-11-05 DIAGNOSIS — R05.9 COUGH: ICD-10-CM

## 2020-11-05 DIAGNOSIS — U07.1 COVID-19: ICD-10-CM

## 2020-11-05 PROCEDURE — 99214 OFFICE O/P EST MOD 30 MIN: CPT | Performed by: NURSE PRACTITIONER

## 2020-11-05 RX ORDER — BENZONATATE 100 MG/1
100 CAPSULE ORAL 3 TIMES DAILY PRN
Qty: 60 CAP | Refills: 0 | Status: SHIPPED
Start: 2020-11-05 | End: 2021-01-20

## 2020-11-05 RX ORDER — AZITHROMYCIN 250 MG/1
TABLET, FILM COATED ORAL
Qty: 6 TAB | Refills: 0 | Status: SHIPPED
Start: 2020-11-05 | End: 2021-01-19

## 2020-11-05 RX ORDER — ALBUTEROL SULFATE 90 UG/1
2 AEROSOL, METERED RESPIRATORY (INHALATION) EVERY 6 HOURS PRN
Qty: 8.5 G | Refills: 0 | Status: SHIPPED
Start: 2020-11-05 | End: 2021-01-20

## 2020-11-05 ASSESSMENT — ENCOUNTER SYMPTOMS
COUGH: 1
FEVER: 0
SORE THROAT: 0
SHORTNESS OF BREATH: 0

## 2020-11-05 ASSESSMENT — FIBROSIS 4 INDEX: FIB4 SCORE: 2.42

## 2020-11-05 NOTE — PATIENT INSTRUCTIONS
INSTRUCTIONS FOR COVID-19 OR ANY OTHER INFECTIOUS RESPIRATORY ILLNESSES    The Centers for Disease Control and Prevention (CDC) states that early indications for COVID-19 include cough, shortness of breath, difficulty breathing, or at least two of the following symptoms: chills, shaking with chills, muscle pain, headache, sore throat, and loss of taste or smell. Symptoms can range from mild to severe and may appear up to two weeks after exposure to the virus.    The practice of self-isolation and quarantine helps protect the public and your family by  preventing exposure to people who have or may have a contagious disease. Please follow the prevention steps below as based on CDC guidelines:    WHEN TO STOP ISOLATION: Persons with COVID-19 or any other infectious respiratory illness who have symptoms and were advised to care for themselves at home may discontinue home isolation under the following conditions:  · At least 24 hours have passed since recovery defined as resolution of fever without the use of fever-reducing medications; AND,  · Improvement in respiratory symptoms (e.g., cough, shortness of breath); AND,  · At least 10 days have passed since symptoms first appeared and have had no subsequent illness.    MONITOR YOUR SYMPTOMS: If your illness is worsening, seek prompt medical attention. If you have a medical emergency and need to call 911, notify the dispatch personnel that you have, or are being evaluated for confirmed or suspected COVID-19 or another infectious respiratory illness. Wear a facemask if possible.    ACTIVITY RESTRICTION: restrict activities outside your home, except for getting medical care. Do not go to work, school, or public areas. Avoid using public transportation, ride-sharing, or taxis.    SCHEDULED MEDICAL APPOINTMENTS: Notify your provider that you have, or are being evaluated for, confirmed or suspected COVID-19 or another infectious respiratory. This will help the healthcare  provider’s office safely take care of you and keep other people from getting exposed or infected.    FACEMASKS, when to wear: Anytime you are away from your home or around other people or pets. If you are unable to wear one, maintain a minimum of 6 feet distancing from others.    LIVING ENVIRONMENT: Stay in a separate room from other people and pets. If possible, use a separate bathroom, have someone else care for your pets and avoid sharing household items. Any items used should be washed thoroughly with soap and water. Clean all “high-touch” surfaces every day. Use a household cleaning spray or wipe, according to the label instructions. High touch surfaces include (but are not limited to) counters, tabletops, doorknobs, bathroom fixtures, toilets, phones, keyboards, tablets, and bedside tables.     HAND WASHING: Frequently wash hands with soap and water for at least 20 seconds,  especially after blowing your nose, coughing, or sneezing; going to the bathroom; before and after interacting with pets; and before and after eating or preparing food. If hands are visibly dirty use soap and water. If soap and water are not available, use an alcohol-based hand  with at least 60% alcohol. Avoid touching your eyes, nose, and mouth with unwashed hands. Cover your coughs and sneezes with a tissue. Throw used tissues in a lined trash can. Immediately wash your hands.    ACTIVE/FACILITATED SELF-MONITORING: Follow instructions provided by your local health department or health professionals, as appropriate. When working with your local health department check their available hours.    [facility]   Phone Number   [facility] [phone]   [facility] [place] [phone]   [place] [address]   [place] [phone]     IF YOU HAVE CONFIRMED POSITIVE COVID-19:    Those who have completely recovered from COVID-19 may have immune-boosting antibodies in their plasma--called “convalescent plasma”--that could be  used to treat critically ill COVID19 patients.    [facility] is excited to begin working with [employer] on collecting convalescent plasma from  people who have recovered from COVID-19 as part of a program to treat patients infected with the virus. This FDA-approved “emergency investigational new drug” is a special blood product containing antibodies that may give patients an extra boost to fight the virus.    To be eligible to donate convalescent plasma, you must have a prior COVID-19 diagnosis documented by a laboratory test (or a positive test result for SARS-CoV-2 antibodies) and meet additional eligibility requirements.    If you are interested in donating convalescent plasma or have any additional questions, please contact the [facility] Convalescent Plasma  at [phone] or via e-mail at [email or web address].      Viral Respiratory Infection  A respiratory infection is an illness that affects part of the respiratory system, such as the lungs, nose, or throat. A respiratory infection that is caused by a virus is called a viral respiratory infection.  Common types of viral respiratory infections include:  · A cold.  · The flu (influenza).  · A respiratory syncytial virus (RSV) infection.  What are the causes?  This condition is caused by a virus.  What are the signs or symptoms?  Symptoms of this condition include:  · A stuffy or runny nose.  · Yellow or green nasal discharge.  · A cough.  · Sneezing.  · Fatigue.  · Achy muscles.  · A sore throat.  · Sweating or chills.  · A fever.  · A headache.  How is this diagnosed?  This condition may be diagnosed based on:  · Your symptoms.  · A physical exam.  · Testing of nasal swabs.  How is this treated?  This condition may be treated with medicines, such as:  · Antiviral medicine. This may shorten the length of time a person has symptoms.  · Expectorants. These make it easier to cough up mucus.  · Decongestant nasal sprays.  · Acetaminophen or  NSAIDs to relieve fever and pain.  Antibiotic medicines are not prescribed for viral infections. This is because antibiotics are designed to kill bacteria. They are not effective against viruses.  Follow these instructions at home:    Managing pain and congestion  · Take over-the-counter and prescription medicines only as told by your health care provider.  · If you have a sore throat, gargle with a salt-water mixture 3-4 times a day or as needed. To make a salt-water mixture, completely dissolve ½-1 tsp of salt in 1 cup of warm water.  · Use nose drops made from salt water to ease congestion and soften raw skin around your nose.  · Drink enough fluid to keep your urine pale yellow. This helps prevent dehydration and helps loosen up mucus.  General instructions  · Rest as much as possible.  · Do not drink alcohol.  · Do not use any products that contain nicotine or tobacco, such as cigarettes and e-cigarettes. If you need help quitting, ask your health care provider.  · Keep all follow-up visits as told by your health care provider. This is important.  How is this prevented?    · Get an annual flu shot. You may get the flu shot in late summer, fall, or winter. Ask your health care provider when you should get your flu shot.  · Avoid exposing others to your respiratory infection.  ? Stay home from work or school as told by your health care provider.  ? Wash your hands with soap and water often, especially after you cough or sneeze. If soap and water are not available, use alcohol-based hand .  · Avoid contact with people who are sick during cold and flu season. This is generally fall and winter.  Contact a health care provider if:  · Your symptoms last for 10 days or longer.  · Your symptoms get worse over time.  · You have a fever.  · You have severe sinus pain in your face or forehead.  · The glands in your jaw or neck become very swollen.  Get help right away if you:  · Feel pain or pressure in your  chest.  · Have shortness of breath.  · Faint or feel like you will faint.  · Have severe and persistent vomiting.  · Feel confused or disoriented.  Summary  · A respiratory infection is an illness that affects part of the respiratory system, such as the lungs, nose, or throat. A respiratory infection that is caused by a virus is called a viral respiratory infection.  · Common types of viral respiratory infections are a cold, influenza, and respiratory syncytial virus (RSV) infection.  · Symptoms of this condition include a stuffy or runny nose, cough, sneezing, fatigue, achy muscles, sore throat, and fevers or chills.  · Antibiotic medicines are not prescribed for viral infections. This is because antibiotics are designed to kill bacteria. They are not effective against viruses.  This information is not intended to replace advice given to you by your health care provider. Make sure you discuss any questions you have with your health care provider.  Document Released: 09/27/2006 Document Revised: 12/26/2019 Document Reviewed: 01/28/2019  [employer] Patient Education © 2020 [employer]

## 2020-11-05 NOTE — PROGRESS NOTES
"  Subjective:     Jama Altman is a 64 y.o. male who presents for Cough (cough, POS COVID tuesday evening)      Cough started Last wed. Tested Monday, positive for COVID. Cough is the most bothersome. No SOB. Slightly Weak. Slight diarrhea off and on. No vomiting. \"Otherwise feels good\". Renal transplant, creatinine has been stable.     Cough  This is a new problem. The current episode started 1 to 4 weeks ago. The problem has been waxing and waning. The cough is non-productive. Associated symptoms include nasal congestion. Pertinent negatives include no chest pain, ear pain, fever, hemoptysis, sore throat, shortness of breath or wheezing. Nothing aggravates the symptoms. He has tried rest for the symptoms. The treatment provided mild relief. His past medical history is significant for pneumonia. There is no history of COPD.       Past Medical History:   Diagnosis Date   • Benign essential hypertension    • Hyperlipoproteinemia    • Hypertension     not on meds anymore   • Pain    • Polycystic kidney 9/10/10    RIGHT KIDNEY TRANSPLANT   • Sleep apnea    • Snoring        Past Surgical History:   Procedure Laterality Date   • KNEE MANIPULATION  2/16/2012    Performed by LATOYA CONNER at SURGERY [facility] ORS   • KNEE UNICOMPARTMENTAL  12/23/2011    Performed by LATOYA CONNER at SURGERY [facility] ORS   • KNEE ARTHROSCOPY  12/23/2011    Performed by LATOYA CONNER at SURGERY [facility] ORS   • KNEE ARTHROSCOPY  5/3/2011    Performed by HANANE GOLDMAN at SURGERY SAME DAY [facility] ORS   • MEDIAL MENISCECTOMY  5/3/2011    Performed by HANANE GOLDMAN at SURGERY SAME DAY [facility] ORS   • OTHER  9/10/10    RIGHT KIDNEY TRANSPLANT   • KNEE ARTHROPLASTY TOTAL  1/12/07    RIGHT   • KNEE ARTHROSCOPY  4/10/06    RIGHT   • OTHER ORTHOPEDIC SURGERY  7/8/74    LEFT KNEE DEBRIDEMENT       Social History     Socioeconomic History   • Marital status:      Spouse name: Not on file   • Number of children: Not on file "   • Years of education: Not on file   • Highest education level: Not on file   Occupational History   • Not on file   Social Needs   • Financial resource strain: Not on file   • Food insecurity     Worry: Not on file     Inability: Not on file   • Transportation needs     Medical: Not on file     Non-medical: Not on file   Tobacco Use   • Smoking status: Never Smoker   • Smokeless tobacco: Never Used   Substance and Sexual Activity   • Alcohol use: No   • Drug use: No   • Sexual activity: Not on file   Lifestyle   • Physical activity     Days per week: Not on file     Minutes per session: Not on file   • Stress: Not on file   Relationships   • Social connections     Talks on phone: Not on file     Gets together: Not on file     Attends [employer] service: Not on file     Active member of club or organization: Not on file     Attends meetings of clubs or organizations: Not on file     Relationship status: Not on file   • Intimate partner violence     Fear of current or ex partner: Not on file     Emotionally abused: Not on file     Physically abused: Not on file     Forced sexual activity: Not on file   Other Topics Concern   • Not on file   Social History Narrative   • Not on file        History reviewed. No pertinent family history.     Allergies   Allergen Reactions   • Doxycycline Rash     Sweats and shakes: 9/28/17: Clarified allergy with patient. Allergy was in 1998 and he doesn't remember what happened. He thought the medication is for pain.  Tolerates doxycycline 9/2017       Review of Systems   Constitutional: Positive for malaise/fatigue. Negative for fever.   HENT: Negative for ear pain and sore throat.    Respiratory: Positive for cough. Negative for hemoptysis, sputum production, shortness of breath and wheezing.    Cardiovascular: Negative for chest pain.   Gastrointestinal: Positive for diarrhea. Negative for abdominal pain and vomiting.   All other systems reviewed and are negative.       Objective:  "  /78   Pulse 84   Temp 37 °C (98.6 °F)   Resp 16   Ht 1.969 m (6' 5.5\")   Wt 109.8 kg (242 lb)   SpO2 94%   BMI 28.33 kg/m²     Physical Exam  Vitals signs reviewed.   Constitutional:       General: He is not in acute distress.     Appearance: He is well-developed. He is not toxic-appearing.   HENT:      Head: Normocephalic and atraumatic.      Right Ear: External ear normal.      Left Ear: External ear normal.      Nose: Nose normal.      Mouth/Throat:      Lips: Pink.      Mouth: Mucous membranes are moist.      Pharynx: Oropharynx is clear. No pharyngeal swelling, oropharyngeal exudate or uvula swelling.   Eyes:      Conjunctiva/sclera: Conjunctivae normal.   Neck:      Musculoskeletal: Normal range of motion and neck supple.   Cardiovascular:      Rate and Rhythm: Normal rate.   Pulmonary:      Effort: Pulmonary effort is normal. No accessory muscle usage, prolonged expiration, respiratory distress or retractions.      Breath sounds: Normal breath sounds. No stridor. No decreased breath sounds, wheezing, rhonchi or rales.   Musculoskeletal: Normal range of motion.   Skin:     General: Skin is warm and dry.      Findings: No rash.   Neurological:      General: No focal deficit present.      Mental Status: He is alert and oriented to person, place, and time.      GCS: GCS eye subscore is 4. GCS verbal subscore is 5. GCS motor subscore is 6.   Psychiatric:         Mood and Affect: Mood normal.         Speech: Speech normal.         Behavior: Behavior normal.         Thought Content: Thought content normal.         Judgment: Judgment normal.         Assessment/Plan:   1. COVID-19  - benzonatate (TESSALON) 100 MG Cap; Take 1 Cap by mouth 3 times a day as needed for Cough.  Dispense: 60 Cap; Refill: 0  - albuterol 108 (90 Base) MCG/ACT Aero Soln inhalation aerosol; Inhale 2 Puffs by mouth every 6 hours as needed for Shortness of Breath.  Dispense: 8.5 g; Refill: 0  - azithromycin (ZITHROMAX) 250 MG Tab; " Take 500 mg day one, 250 mg days 2-5.  Dispense: 6 Tab; Refill: 0    2. Cough  - benzonatate (TESSALON) 100 MG Cap; Take 1 Cap by mouth 3 times a day as needed for Cough.  Dispense: 60 Cap; Refill: 0  - albuterol 108 (90 Base) MCG/ACT Aero Soln inhalation aerosol; Inhale 2 Puffs by mouth every 6 hours as needed for Shortness of Breath.  Dispense: 8.5 g; Refill: 0  - azithromycin (ZITHROMAX) 250 MG Tab; Take 500 mg day one, 250 mg days 2-5.  Dispense: 6 Tab; Refill: 0    Symptomatic care.  -Oral hydration and rest.   -Cough control: nonpharmacologic options for cough relief such as throat lozenges, hot tea, honey.  -Over the counter expectorant as directed; Guaifenesin (Mucinex).  -Tylenol or ibuprofen for pain and fever as directed.   -Albuterol inhaler as directed.    Follow up with PCP. Follow up for increased or persistent shortness of breath or wheezing, fevers, elevated heart rate, weakness, prolonged cough, chest pain, or any other concerns. Renal transplant,ER follow up for SOB. Discussed increased risk for complications of COVID. Follow up urgently with any concerns.     Differential diagnosis, natural history, supportive care, and indications for immediate follow-up discussed.

## 2020-11-06 NOTE — TELEPHONE ENCOUNTER
[id number] - [place], [place] - [address]  [address] [place] [address]  Phone: [phone] Fax: [phone]    Pharmacy received rx for glyburide 5mg bid and states he was previously once a day.  States pt was not aware of increase and they are asking for veification

## 2020-11-06 NOTE — TELEPHONE ENCOUNTER
Delmi Curiel P.A.-C.  Coral Suarez, Med Ass't 1 hour ago (8:32 AM)     Forwarded to [place].     Message text       Delmi Curiel P.A.-C.  You 1 hour ago (8:31 AM)     Can we call patient? Thanks    Message text

## 2020-11-07 ASSESSMENT — ENCOUNTER SYMPTOMS
VOMITING: 0
WHEEZING: 0
HEMOPTYSIS: 0
DIARRHEA: 1

## 2020-11-07 ASSESSMENT — COPD QUESTIONNAIRES: COPD: 0

## 2020-11-08 ASSESSMENT — ENCOUNTER SYMPTOMS
ABDOMINAL PAIN: 0
SPUTUM PRODUCTION: 0

## 2020-11-13 ENCOUNTER — APPOINTMENT (OUTPATIENT)
Dept: RADIOLOGY | Facility: MEDICAL CENTER | Age: 65
DRG: 871 | End: 2020-11-13
Attending: EMERGENCY MEDICINE
Payer: COMMERCIAL

## 2020-11-13 ENCOUNTER — HOSPITAL ENCOUNTER (INPATIENT)
Facility: MEDICAL CENTER | Age: 65
LOS: 2 days | DRG: 871 | End: 2020-11-15
Attending: EMERGENCY MEDICINE | Admitting: INTERNAL MEDICINE
Payer: COMMERCIAL

## 2020-11-13 DIAGNOSIS — U07.1 COVID-19 VIRUS INFECTION: ICD-10-CM

## 2020-11-13 DIAGNOSIS — N17.9 AKI (ACUTE KIDNEY INJURY) (HCC): ICD-10-CM

## 2020-11-13 LAB
ALBUMIN SERPL BCP-MCNC: 4 G/DL (ref 3.2–4.9)
ALBUMIN/GLOB SERPL: 1.5 G/DL
ALP SERPL-CCNC: 112 U/L (ref 30–99)
ALT SERPL-CCNC: 24 U/L (ref 2–50)
ANION GAP SERPL CALC-SCNC: 14 MMOL/L (ref 7–16)
AST SERPL-CCNC: 32 U/L (ref 12–45)
BASOPHILS # BLD AUTO: 0.1 % (ref 0–1.8)
BASOPHILS # BLD: 0.01 K/UL (ref 0–0.12)
BILIRUB SERPL-MCNC: 0.8 MG/DL (ref 0.1–1.5)
BUN SERPL-MCNC: 56 MG/DL (ref 8–22)
CALCIUM SERPL-MCNC: 9.1 MG/DL (ref 8.5–10.5)
CHLORIDE SERPL-SCNC: 98 MMOL/L (ref 96–112)
CO2 SERPL-SCNC: 22 MMOL/L (ref 20–33)
COVID ORDER STATUS COVID19: NORMAL
CREAT SERPL-MCNC: 2.63 MG/DL (ref 0.5–1.4)
EKG IMPRESSION: NORMAL
EOSINOPHIL # BLD AUTO: 0.01 K/UL (ref 0–0.51)
EOSINOPHIL NFR BLD: 0.1 % (ref 0–6.9)
ERYTHROCYTE [DISTWIDTH] IN BLOOD BY AUTOMATED COUNT: 46.4 FL (ref 35.9–50)
FLUAV RNA SPEC QL NAA+PROBE: NEGATIVE
FLUBV RNA SPEC QL NAA+PROBE: NEGATIVE
GLOBULIN SER CALC-MCNC: 2.7 G/DL (ref 1.9–3.5)
GLUCOSE BLD-MCNC: 268 MG/DL (ref 65–99)
GLUCOSE SERPL-MCNC: 170 MG/DL (ref 65–99)
HCT VFR BLD AUTO: 47.5 % (ref 42–52)
HGB BLD-MCNC: 14.9 G/DL (ref 14–18)
IMM GRANULOCYTES # BLD AUTO: 0.04 K/UL (ref 0–0.11)
IMM GRANULOCYTES NFR BLD AUTO: 0.5 % (ref 0–0.9)
LACTATE BLD-SCNC: 1.7 MMOL/L (ref 0.5–2)
LYMPHOCYTES # BLD AUTO: 0.23 K/UL (ref 1–4.8)
LYMPHOCYTES NFR BLD: 3.1 % (ref 22–41)
MCH RBC QN AUTO: 28.4 PG (ref 27–33)
MCHC RBC AUTO-ENTMCNC: 31.4 G/DL (ref 33.7–35.3)
MCV RBC AUTO: 90.5 FL (ref 81.4–97.8)
MONOCYTES # BLD AUTO: 0.51 K/UL (ref 0–0.85)
MONOCYTES NFR BLD AUTO: 6.9 % (ref 0–13.4)
NEUTROPHILS # BLD AUTO: 6.54 K/UL (ref 1.82–7.42)
NEUTROPHILS NFR BLD: 89.3 % (ref 44–72)
NRBC # BLD AUTO: 0 K/UL
NRBC BLD-RTO: 0 /100 WBC
PLATELET # BLD AUTO: 100 K/UL (ref 164–446)
PMV BLD AUTO: 12.6 FL (ref 9–12.9)
POTASSIUM SERPL-SCNC: 5.4 MMOL/L (ref 3.6–5.5)
PROT SERPL-MCNC: 6.7 G/DL (ref 6–8.2)
RBC # BLD AUTO: 5.25 M/UL (ref 4.7–6.1)
RSV RNA SPEC QL NAA+PROBE: NEGATIVE
SARS-COV-2 RNA RESP QL NAA+PROBE: DETECTED
SODIUM SERPL-SCNC: 134 MMOL/L (ref 135–145)
SPECIMEN SOURCE: ABNORMAL
TROPONIN T SERPL-MCNC: 54 NG/L (ref 6–19)
WBC # BLD AUTO: 7.3 K/UL (ref 4.8–10.8)

## 2020-11-13 PROCEDURE — 700102 HCHG RX REV CODE 250 W/ 637 OVERRIDE(OP): Performed by: INTERNAL MEDICINE

## 2020-11-13 PROCEDURE — 99285 EMERGENCY DEPT VISIT HI MDM: CPT

## 2020-11-13 PROCEDURE — A9270 NON-COVERED ITEM OR SERVICE: HCPCS | Performed by: INTERNAL MEDICINE

## 2020-11-13 PROCEDURE — 93005 ELECTROCARDIOGRAM TRACING: CPT | Performed by: EMERGENCY MEDICINE

## 2020-11-13 PROCEDURE — 80053 COMPREHEN METABOLIC PANEL: CPT

## 2020-11-13 PROCEDURE — 82962 GLUCOSE BLOOD TEST: CPT

## 2020-11-13 PROCEDURE — 700105 HCHG RX REV CODE 258: Performed by: EMERGENCY MEDICINE

## 2020-11-13 PROCEDURE — 770001 HCHG ROOM/CARE - MED/SURG/GYN PRIV*

## 2020-11-13 PROCEDURE — 83605 ASSAY OF LACTIC ACID: CPT

## 2020-11-13 PROCEDURE — 71045 X-RAY EXAM CHEST 1 VIEW: CPT

## 2020-11-13 PROCEDURE — 700105 HCHG RX REV CODE 258: Performed by: INTERNAL MEDICINE

## 2020-11-13 PROCEDURE — 84484 ASSAY OF TROPONIN QUANT: CPT

## 2020-11-13 PROCEDURE — 770021 HCHG ROOM/CARE - ISO PRIVATE

## 2020-11-13 PROCEDURE — 85025 COMPLETE CBC W/AUTO DIFF WBC: CPT

## 2020-11-13 PROCEDURE — 700111 HCHG RX REV CODE 636 W/ 250 OVERRIDE (IP): Performed by: INTERNAL MEDICINE

## 2020-11-13 PROCEDURE — 0241U HCHG SARS-COV-2 COVID-19 NFCT DS RESP RNA 4 TRGT MIC: CPT

## 2020-11-13 PROCEDURE — 36415 COLL VENOUS BLD VENIPUNCTURE: CPT

## 2020-11-13 PROCEDURE — 99221 1ST HOSP IP/OBS SF/LOW 40: CPT | Performed by: INTERNAL MEDICINE

## 2020-11-13 RX ORDER — DEXAMETHASONE SODIUM PHOSPHATE 4 MG/ML
6 INJECTION, SOLUTION INTRA-ARTICULAR; INTRALESIONAL; INTRAMUSCULAR; INTRAVENOUS; SOFT TISSUE DAILY
Status: DISCONTINUED | OUTPATIENT
Start: 2020-11-13 | End: 2020-11-15 | Stop reason: HOSPADM

## 2020-11-13 RX ORDER — ATORVASTATIN CALCIUM 80 MG/1
80 TABLET, FILM COATED ORAL
COMMUNITY
End: 2020-12-21 | Stop reason: SDUPTHER

## 2020-11-13 RX ORDER — ACETAMINOPHEN 325 MG/1
650 TABLET ORAL EVERY 6 HOURS PRN
Status: DISCONTINUED | OUTPATIENT
Start: 2020-11-13 | End: 2020-11-15 | Stop reason: HOSPADM

## 2020-11-13 RX ORDER — TACROLIMUS 1 MG/1
1 CAPSULE ORAL 2 TIMES DAILY
Status: DISCONTINUED | OUTPATIENT
Start: 2020-11-14 | End: 2020-11-15 | Stop reason: HOSPADM

## 2020-11-13 RX ORDER — DEXTROSE MONOHYDRATE 25 G/50ML
50 INJECTION, SOLUTION INTRAVENOUS
Status: DISCONTINUED | OUTPATIENT
Start: 2020-11-13 | End: 2020-11-15 | Stop reason: HOSPADM

## 2020-11-13 RX ORDER — ATORVASTATIN CALCIUM 80 MG/1
80 TABLET, FILM COATED ORAL
Status: DISCONTINUED | OUTPATIENT
Start: 2020-11-13 | End: 2020-11-15 | Stop reason: HOSPADM

## 2020-11-13 RX ORDER — METOPROLOL SUCCINATE 25 MG/1
25 TABLET, EXTENDED RELEASE ORAL
Status: DISCONTINUED | OUTPATIENT
Start: 2020-11-13 | End: 2020-11-15 | Stop reason: HOSPADM

## 2020-11-13 RX ORDER — SODIUM CHLORIDE 9 MG/ML
1000 INJECTION, SOLUTION INTRAVENOUS ONCE
Status: COMPLETED | OUTPATIENT
Start: 2020-11-13 | End: 2020-11-13

## 2020-11-13 RX ORDER — GUAIFENESIN 600 MG/1
600 TABLET, EXTENDED RELEASE ORAL 2 TIMES DAILY PRN
Status: DISCONTINUED | OUTPATIENT
Start: 2020-11-13 | End: 2020-11-15 | Stop reason: HOSPADM

## 2020-11-13 RX ORDER — SODIUM CHLORIDE 9 MG/ML
INJECTION, SOLUTION INTRAVENOUS CONTINUOUS
Status: DISCONTINUED | OUTPATIENT
Start: 2020-11-13 | End: 2020-11-14

## 2020-11-13 RX ORDER — ONDANSETRON 4 MG/1
4 TABLET, ORALLY DISINTEGRATING ORAL EVERY 6 HOURS PRN
Status: DISCONTINUED | OUTPATIENT
Start: 2020-11-13 | End: 2020-11-15 | Stop reason: HOSPADM

## 2020-11-13 RX ORDER — MYCOPHENOLATE MOFETIL 250 MG/1
500 CAPSULE ORAL 2 TIMES DAILY
Status: DISCONTINUED | OUTPATIENT
Start: 2020-11-14 | End: 2020-11-13

## 2020-11-13 RX ORDER — ONDANSETRON 2 MG/ML
4 INJECTION INTRAMUSCULAR; INTRAVENOUS EVERY 6 HOURS PRN
Status: DISCONTINUED | OUTPATIENT
Start: 2020-11-13 | End: 2020-11-15 | Stop reason: HOSPADM

## 2020-11-13 RX ADMIN — INSULIN HUMAN 3 UNITS: 100 INJECTION, SOLUTION PARENTERAL at 21:00

## 2020-11-13 RX ADMIN — SODIUM CHLORIDE: 9 INJECTION, SOLUTION INTRAVENOUS at 18:10

## 2020-11-13 RX ADMIN — DEXAMETHASONE SODIUM PHOSPHATE 6 MG: 4 INJECTION, SOLUTION INTRA-ARTICULAR; INTRALESIONAL; INTRAMUSCULAR; INTRAVENOUS; SOFT TISSUE at 21:14

## 2020-11-13 RX ADMIN — ENOXAPARIN SODIUM 40 MG: 40 INJECTION SUBCUTANEOUS at 21:13

## 2020-11-13 RX ADMIN — ATORVASTATIN CALCIUM 80 MG: 80 TABLET, FILM COATED ORAL at 21:13

## 2020-11-13 RX ADMIN — SODIUM CHLORIDE 1000 ML: 9 INJECTION, SOLUTION INTRAVENOUS at 16:00

## 2020-11-13 RX ADMIN — METOPROLOL SUCCINATE 25 MG: 25 TABLET, EXTENDED RELEASE ORAL at 21:13

## 2020-11-13 RX ADMIN — GUAIFENESIN 600 MG: 600 TABLET, EXTENDED RELEASE ORAL at 21:13

## 2020-11-13 RX ADMIN — ACETAMINOPHEN 650 MG: 325 TABLET, FILM COATED ORAL at 23:18

## 2020-11-13 ASSESSMENT — PATIENT HEALTH QUESTIONNAIRE - PHQ9
SUM OF ALL RESPONSES TO PHQ9 QUESTIONS 1 AND 2: 0
1. LITTLE INTEREST OR PLEASURE IN DOING THINGS: NOT AT ALL
2. FEELING DOWN, DEPRESSED, IRRITABLE, OR HOPELESS: NOT AT ALL

## 2020-11-13 ASSESSMENT — ENCOUNTER SYMPTOMS
DIZZINESS: 0
CHILLS: 0
HEMOPTYSIS: 0
SPEECH CHANGE: 0
SENSORY CHANGE: 0
PHOTOPHOBIA: 0
HEADACHES: 0
ORTHOPNEA: 0
NAUSEA: 0
DEPRESSION: 0
BLURRED VISION: 0
BACK PAIN: 0
SPUTUM PRODUCTION: 0
SORE THROAT: 0
WEAKNESS: 0
WHEEZING: 0
CLAUDICATION: 0
MYALGIAS: 0
EYE DISCHARGE: 0
DOUBLE VISION: 0
BLOOD IN STOOL: 0
FLANK PAIN: 0
VOMITING: 0
ABDOMINAL PAIN: 0
HEARTBURN: 0
COUGH: 0
FEVER: 0
DIARRHEA: 0
SHORTNESS OF BREATH: 0
PALPITATIONS: 0
BRUISES/BLEEDS EASILY: 0

## 2020-11-13 ASSESSMENT — FIBROSIS 4 INDEX
FIB4 SCORE: 2.42
FIB4 SCORE: 4.12

## 2020-11-13 NOTE — TELEPHONE ENCOUNTER
Delmi- Pt has poor renal function (CrCl- 56.1 ml/min-using adjusted BW). Recommendations based off of CrCl state dose of 400-1400mg/day in 2 divided doses). Not sure if you would like to adjust dose at this time. Please advise.

## 2020-11-13 NOTE — ED TRIAGE NOTES
Jama Altman  Chief Complaint   Patient presents with   • Fever     covid positive on 11/2, continuing symptoms   • Cough     Pt ambulatory to triage with above complaint.  VSS, no acute distress.    Continued symptoms of covid since 10/28.  Pt on immunosuppressants r/t kidney transplant.   Pt/staff masked and in appropriate PPE during encounter.   Pt returned to lobby, educated on triage process, and to inform staff of any changes or concerns.

## 2020-11-13 NOTE — ED NOTES
Med rec complete per phone call with pt home pharmacy. Unable to interview pt at bedside at this time. Attempted to call pt, no answer. Pt is on tacrolimus 1mg 1 cap bid. Unable to review allergies. Per pharmacy pt was sold a z-rubén on 11/5/20.

## 2020-11-13 NOTE — ED PROVIDER NOTES
ED Provider Note    Scribed for Scotty Alexander M.D. by Minerva Whiting. 11/13/2020, 12:36 PM.    Primary care provider: Delmi Curiel P.A.-C.  Means of arrival: Walk-in  History obtained from: Patient  History limited by: None noted    CHIEF COMPLAINT  Chief Complaint   Patient presents with   • Fever     covid positive on 11/2, continuing symptoms   • Cough       HPI  Jama Altman is a 64 y.o. male COVID positive 11/2 who presents to the Emergency Department with COVID-like symptoms onset 10/28. Patient endorses associated cough with sputum production, fatigue, loss of taste, and fevers. Patient reports a maximum temperature of 100 °F. Patient denies any nausea, vomiting, or loss of smell. He states his symptoms are worse in the afternoons. He takes his normal regimen of Anti rejection medications for his kidney. Patient reports using an inhaler and Z-pack, which mildly alleviated his symptoms.    PPE Note: I personally donned full PPE for all patient encounters during this visit, including wearing an N95 respirator mask, gloves, and eye protection. Scribe remained outside the patient's room and did not have any contact with the patient for the duration of patient encounter.      REVIEW OF SYSTEMS  Pertinent positives include COVID-like symptoms, cough with sputum, fatigue, loss of taste, and fevers. Pertinent negatives include nausea, vomiting, or loss of smell. All other systems negative.    PAST MEDICAL HISTORY   has a past medical history of Benign essential hypertension, Hyperlipoproteinemia, Hypertension, Pain, Polycystic kidney (9/10/10), Sleep apnea, and Snoring.    SURGICAL HISTORY   has a past surgical history that includes knee arthroplasty total (1/12/07); knee arthroscopy (4/10/06); other orthopedic surgery (7/8/74); other (9/10/10); knee arthroscopy (5/3/2011); medial meniscectomy (5/3/2011); knee unicompartmental (12/23/2011); knee arthroscopy (12/23/2011); and knee manipulation  "(2/16/2012).    SOCIAL HISTORY  Social History     Tobacco Use   • Smoking status: Never Smoker   • Smokeless tobacco: Never Used   Substance Use Topics   • Alcohol use: No   • Drug use: No      Social History     Substance and Sexual Activity   Drug Use No       FAMILY HISTORY  None noted    CURRENT MEDICATIONS  Home Medications     Reviewed by Emir Tena (Pharmacy Tech) on 11/13/20 at 1451  Med List Status: Complete   Medication Last Dose Status   albuterol 108 (90 Base) MCG/ACT Aero Soln inhalation aerosol unknown Active   atorvastatin (LIPITOR) 80 MG tablet unknown Active   azithromycin (ZITHROMAX) 250 MG Tab unknown Active   benzonatate (TESSALON) 100 MG Cap unknown Active   Cholecalciferol 1.25 MG (42753 UT) Tab unkown Active   gabapentin (NEURONTIN) 300 MG Cap unknown Active   glyBURIDE (DIABETA) 5 MG Tab unknown Active   linagliptin (TRADJENTA) 5 MG TABS tablet unknown Active   metoprolol (LOPRESSOR) 25 MG Tab unknown Active   metoprolol SR (TOPROL XL) 25 MG TABLET SR 24 HR Not Taking Active   mycophenolate (CELLCEPT) 250 MG Cap unknown Active   omeprazole (PRILOSEC) 20 MG Tablet Delayed Response delayed-release tablet unknown Active   ondansetron (ZOFRAN ODT) 4 MG TABLET DISPERSIBLE Not Taking Active   pioglitazone (ACTOS) 45 MG Tab unknown Active   predniSONE (DELTASONE) 5 MG TABS unknown Active   tacrolimus (PROGRAF) 1 MG CAPS unknown Active                ALLERGIES  Allergies   Allergen Reactions   • Doxycycline Rash     Sweats and shakes: 9/28/17: Clarified allergy with patient. Allergy was in 1998 and he doesn't remember what happened. He thought the medication is for pain.  Tolerates doxycycline 9/2017       PHYSICAL EXAM  VITAL SIGNS: BP (!) 94/59   Pulse 78   Temp 37.1 °C (98.8 °F)   Resp 20   Ht 1.956 m (6' 5\")   Wt 101.3 kg (223 lb 5.2 oz)   SpO2 94%   BMI 26.48 kg/m²     Constitutional: Well developed, Well nourished, Mild distress.   HENT: Normocephalic, Atraumatic, Patient is " wearing a face mask.   Eyes: Conjunctiva normal, No discharge.   Cardiovascular: Normal heart rate, Normal rhythm, No murmurs, equal pulses. No edema in legs, no calf tenderness.   Pulmonary: Normal breath sounds, No respiratory distress, No wheezing, No rales, No rhonchi.  Abdomen:Soft, No tenderness, No masses, no rebound, no guarding.   Back: No CVA tenderness.   Musculoskeletal: No major deformities noted, No tenderness.   Skin: Warm, Dry, No erythema, No rash.   Neurologic: Alert & oriented x 3, Normal motor function,  No focal deficits noted.   Psychiatric: Affect normal, Judgment normal, Mood normal.     LABS  Results for orders placed or performed during the hospital encounter of 11/13/20   CBC WITH DIFFERENTIAL   Result Value Ref Range    WBC 7.3 4.8 - 10.8 K/uL    RBC 5.25 4.70 - 6.10 M/uL    Hemoglobin 14.9 14.0 - 18.0 g/dL    Hematocrit 47.5 42.0 - 52.0 %    MCV 90.5 81.4 - 97.8 fL    MCH 28.4 27.0 - 33.0 pg    MCHC 31.4 (L) 33.7 - 35.3 g/dL    RDW 46.4 35.9 - 50.0 fL    Platelet Count 100 (L) 164 - 446 K/uL    MPV 12.6 9.0 - 12.9 fL    Neutrophils-Polys 89.30 (H) 44.00 - 72.00 %    Lymphocytes 3.10 (L) 22.00 - 41.00 %    Monocytes 6.90 0.00 - 13.40 %    Eosinophils 0.10 0.00 - 6.90 %    Basophils 0.10 0.00 - 1.80 %    Immature Granulocytes 0.50 0.00 - 0.90 %    Nucleated RBC 0.00 /100 WBC    Neutrophils (Absolute) 6.54 1.82 - 7.42 K/uL    Lymphs (Absolute) 0.23 (L) 1.00 - 4.80 K/uL    Monos (Absolute) 0.51 0.00 - 0.85 K/uL    Eos (Absolute) 0.01 0.00 - 0.51 K/uL    Baso (Absolute) 0.01 0.00 - 0.12 K/uL    Immature Granulocytes (abs) 0.04 0.00 - 0.11 K/uL    NRBC (Absolute) 0.00 K/uL   COMP METABOLIC PANEL   Result Value Ref Range    Sodium 134 (L) 135 - 145 mmol/L    Potassium 5.4 3.6 - 5.5 mmol/L    Chloride 98 96 - 112 mmol/L    Co2 22 20 - 33 mmol/L    Anion Gap 14.0 7.0 - 16.0    Glucose 170 (H) 65 - 99 mg/dL    Bun 56 (H) 8 - 22 mg/dL    Creatinine 2.63 (H) 0.50 - 1.40 mg/dL    Calcium 9.1 8.5 -  10.5 mg/dL    AST(SGOT) 32 12 - 45 U/L    ALT(SGPT) 24 2 - 50 U/L    Alkaline Phosphatase 112 (H) 30 - 99 U/L    Total Bilirubin 0.8 0.1 - 1.5 mg/dL    Albumin 4.0 3.2 - 4.9 g/dL    Total Protein 6.7 6.0 - 8.2 g/dL    Globulin 2.7 1.9 - 3.5 g/dL    A-G Ratio 1.5 g/dL   TROPONIN   Result Value Ref Range    Troponin T 54 (H) 6 - 19 ng/L   LACTIC ACID   Result Value Ref Range    Lactic Acid 1.7 0.5 - 2.0 mmol/L   ESTIMATED GFR   Result Value Ref Range    GFR If African American 30 (A) >60 mL/min/1.73 m 2    GFR If Non African American 25 (A) >60 mL/min/1.73 m 2   COVID/SARS CoV-2 PCR    Specimen: Nasopharyngeal; Respirate   Result Value Ref Range    COVID Order Status Received    CoV-2, Flu A/B, And RSV by PCR   Result Value Ref Range    SARS-CoV-2 Source NP Swab    EKG (NOW)   Result Value Ref Range    Report       [facility] Emergency Dept.    Test Date:  2020  Pt Name:    CIARA FORRESTER                   Department: ER  MRN:        [id number]                      Room:       [facility]  Gender:     Male                         Technician: [id number]  :        [id number]                   Requested By:MARIA DEL CARMEN MENJIVAR  Order #:    [id number]                    Reading MD: MARIA DEL CARMEN MENJIVAR MD    Measurements  Intervals                                Axis  Rate:       106                          P:          14  MD:         172                          QRS:        120  QRSD:       143                          T:          -4  QT:         354  QTc:        471    Interpretive Statements  Sinus tachycardia, normal axis  Multiple premature complexes, vent & supraven  RBBB and LPFB  Compared to ECG 2017 10:20:16  Left posterior fascicular block now present  Sinus rhythm no longer present  Ventricular premature complex(es) no longer present  Electronically Signed On 2020 1 5:51:37 PST by MARIA DEL CARMEN MENJIVAR MD         All labs reviewed by me.    EKG  12 Lead EKG interpreted by me as detailed  above.     RADIOLOGY  DX-CHEST-PORTABLE (1 VIEW)   Final Result      Patchy bilateral hazy pulmonary opacities is likely in keeping with Covid 19 pneumonia.        The radiologist's interpretation of all radiological studies have been reviewed by me.    COURSE & MEDICAL DECISION MAKING  Pertinent Labs & Imaging studies reviewed. (See chart for details)    12:36 PM - Patient seen and examined at bedside. Patient is wearing a face mask. Ordered Dx-chest, COVID/SARS, CBC with differential, CMP, Troponin, Lactic Acid, and EKG to evaluate his symptoms. The differential diagnoses include but are not limited to: COVID, Secondary pneumonia, or electrolyte abnormality.    3:51 PM - Paged hospitalist    4:10 PM - Paged hosptialist.    4:19 PM I discussed the patient's case and the above findings with Dr. Hirsch (nephrology) who would like me to hydrate him.     4:27 PM I discussed the patient's case and the above findings with Dr. Jewell (hospitalist) who will accept the patient for hospitalization.    HYDRATION: Based on the patient's presentation of Acute Kidney Injury and Dehydration the patient was given IV fluids. IV Hydration was used because oral hydration was not adequate alone. Upon recheck following hydration, the patient was improved.    Medical Decision Making: This point time appears the patient most likely has dehydration probably secondary to his COVID-19 causing SANKET.  Given the fact that the patient is a renal transplant patient he will be admitted for careful rehydration secondary fact the patient is Covid positive.    DISPOSITION:  Patient will be hospitalized by Dr. Jewell in gaurded condition.     FINAL IMPRESSION  1. SANKET (acute kidney injury) (HCC)    2. COVID-19 virus infection    3.  Kidney transplant patient     Minerva STOCKTON (P[place]), am scribing for, and in the presence of, Scotty Alexander M.D.    Electronically signed by: Minerva Whiting ([place]), 11/13/2020    Scotty STOCKTON M.D. personally  performed the services described in this documentation, as scribed by Minerva Whiting in my presence, and it is both accurate and complete. C.    The note accurately reflects work and decisions made by me.  Scotty Alexander M.D.  11/13/2020  5:33 PM

## 2020-11-13 NOTE — ED NOTES
Pt resting quietly.  Hungry.  Dr Brewer approves pt to eat and drink.  Boxed meal provided.  O2 sats maintained on room air.

## 2020-11-14 LAB
ALBUMIN SERPL BCP-MCNC: 3.3 G/DL (ref 3.2–4.9)
APPEARANCE UR: CLEAR
BILIRUB UR QL STRIP.AUTO: NEGATIVE
BUN SERPL-MCNC: 58 MG/DL (ref 8–22)
CALCIUM SERPL-MCNC: 8.4 MG/DL (ref 8.5–10.5)
CHLORIDE SERPL-SCNC: 102 MMOL/L (ref 96–112)
CO2 SERPL-SCNC: 19 MMOL/L (ref 20–33)
COLOR UR: YELLOW
CREAT SERPL-MCNC: 2.2 MG/DL (ref 0.5–1.4)
GLUCOSE BLD-MCNC: 274 MG/DL (ref 65–99)
GLUCOSE BLD-MCNC: 340 MG/DL (ref 65–99)
GLUCOSE BLD-MCNC: 363 MG/DL (ref 65–99)
GLUCOSE BLD-MCNC: 420 MG/DL (ref 65–99)
GLUCOSE SERPL-MCNC: 234 MG/DL (ref 65–99)
GLUCOSE UR STRIP.AUTO-MCNC: 500 MG/DL
KETONES UR STRIP.AUTO-MCNC: ABNORMAL MG/DL
LEUKOCYTE ESTERASE UR QL STRIP.AUTO: NEGATIVE
MICRO URNS: ABNORMAL
NITRITE UR QL STRIP.AUTO: NEGATIVE
PH UR STRIP.AUTO: 5 [PH] (ref 5–8)
PHOSPHATE SERPL-MCNC: 1.9 MG/DL (ref 2.5–4.5)
POTASSIUM SERPL-SCNC: 4.5 MMOL/L (ref 3.6–5.5)
POTASSIUM SERPL-SCNC: 5.6 MMOL/L (ref 3.6–5.5)
PROT UR QL STRIP: NEGATIVE MG/DL
RBC UR QL AUTO: NEGATIVE
SODIUM SERPL-SCNC: 134 MMOL/L (ref 135–145)
SP GR UR STRIP.AUTO: 1.02
UROBILINOGEN UR STRIP.AUTO-MCNC: 0.2 MG/DL

## 2020-11-14 PROCEDURE — 82962 GLUCOSE BLOOD TEST: CPT | Mod: 91

## 2020-11-14 PROCEDURE — A9270 NON-COVERED ITEM OR SERVICE: HCPCS | Performed by: INTERNAL MEDICINE

## 2020-11-14 PROCEDURE — 84132 ASSAY OF SERUM POTASSIUM: CPT

## 2020-11-14 PROCEDURE — 700102 HCHG RX REV CODE 250 W/ 637 OVERRIDE(OP): Performed by: INTERNAL MEDICINE

## 2020-11-14 PROCEDURE — 80069 RENAL FUNCTION PANEL: CPT

## 2020-11-14 PROCEDURE — 770021 HCHG ROOM/CARE - ISO PRIVATE

## 2020-11-14 PROCEDURE — 700105 HCHG RX REV CODE 258: Performed by: INTERNAL MEDICINE

## 2020-11-14 PROCEDURE — 700111 HCHG RX REV CODE 636 W/ 250 OVERRIDE (IP): Performed by: STUDENT IN AN ORGANIZED HEALTH CARE EDUCATION/TRAINING PROGRAM

## 2020-11-14 PROCEDURE — 700111 HCHG RX REV CODE 636 W/ 250 OVERRIDE (IP): Performed by: INTERNAL MEDICINE

## 2020-11-14 PROCEDURE — 700102 HCHG RX REV CODE 250 W/ 637 OVERRIDE(OP): Performed by: STUDENT IN AN ORGANIZED HEALTH CARE EDUCATION/TRAINING PROGRAM

## 2020-11-14 PROCEDURE — 700101 HCHG RX REV CODE 250: Performed by: STUDENT IN AN ORGANIZED HEALTH CARE EDUCATION/TRAINING PROGRAM

## 2020-11-14 PROCEDURE — 81003 URINALYSIS AUTO W/O SCOPE: CPT

## 2020-11-14 PROCEDURE — 99233 SBSQ HOSP IP/OBS HIGH 50: CPT | Performed by: STUDENT IN AN ORGANIZED HEALTH CARE EDUCATION/TRAINING PROGRAM

## 2020-11-14 PROCEDURE — 700105 HCHG RX REV CODE 258: Performed by: STUDENT IN AN ORGANIZED HEALTH CARE EDUCATION/TRAINING PROGRAM

## 2020-11-14 RX ORDER — INSULIN GLARGINE 100 [IU]/ML
10 INJECTION, SOLUTION SUBCUTANEOUS
Status: DISCONTINUED | OUTPATIENT
Start: 2020-11-14 | End: 2020-11-15 | Stop reason: HOSPADM

## 2020-11-14 RX ORDER — HEPARIN SODIUM 5000 [USP'U]/ML
5000 INJECTION, SOLUTION INTRAVENOUS; SUBCUTANEOUS EVERY 8 HOURS
Status: DISCONTINUED | OUTPATIENT
Start: 2020-11-14 | End: 2020-11-15 | Stop reason: HOSPADM

## 2020-11-14 RX ADMIN — DEXAMETHASONE SODIUM PHOSPHATE 6 MG: 4 INJECTION, SOLUTION INTRA-ARTICULAR; INTRALESIONAL; INTRAMUSCULAR; INTRAVENOUS; SOFT TISSUE at 04:12

## 2020-11-14 RX ADMIN — ENOXAPARIN SODIUM 40 MG: 40 INJECTION SUBCUTANEOUS at 04:13

## 2020-11-14 RX ADMIN — TACROLIMUS 1 MG: 1 CAPSULE ORAL at 04:13

## 2020-11-14 RX ADMIN — INSULIN HUMAN 3 UNITS: 100 INJECTION, SOLUTION PARENTERAL at 09:02

## 2020-11-14 RX ADMIN — ATORVASTATIN CALCIUM 80 MG: 80 TABLET, FILM COATED ORAL at 21:16

## 2020-11-14 RX ADMIN — SODIUM CHLORIDE: 9 INJECTION, SOLUTION INTRAVENOUS at 04:12

## 2020-11-14 RX ADMIN — TACROLIMUS 1 MG: 1 CAPSULE ORAL at 18:05

## 2020-11-14 RX ADMIN — PATIROMER 8.4 G: 8.4 POWDER, FOR SUSPENSION ORAL at 13:31

## 2020-11-14 RX ADMIN — SODIUM PHOSPHATE, MONOBASIC, MONOHYDRATE 30 MMOL: 276; 142 INJECTION, SOLUTION INTRAVENOUS at 09:01

## 2020-11-14 RX ADMIN — METOPROLOL SUCCINATE 25 MG: 25 TABLET, EXTENDED RELEASE ORAL at 18:05

## 2020-11-14 RX ADMIN — HEPARIN SODIUM 5000 UNITS: 5000 INJECTION, SOLUTION INTRAVENOUS; SUBCUTANEOUS at 21:16

## 2020-11-14 RX ADMIN — HEPARIN SODIUM 5000 UNITS: 5000 INJECTION, SOLUTION INTRAVENOUS; SUBCUTANEOUS at 13:34

## 2020-11-14 RX ADMIN — GUAIFENESIN 600 MG: 600 TABLET, EXTENDED RELEASE ORAL at 21:27

## 2020-11-14 RX ADMIN — INSULIN HUMAN 5 UNITS: 100 INJECTION, SOLUTION PARENTERAL at 18:05

## 2020-11-14 RX ADMIN — INSULIN HUMAN 6 UNITS: 100 INJECTION, SOLUTION PARENTERAL at 13:32

## 2020-11-14 RX ADMIN — HEPARIN SODIUM 5000 UNITS: 5000 INJECTION, SOLUTION INTRAVENOUS; SUBCUTANEOUS at 09:01

## 2020-11-14 RX ADMIN — INSULIN HUMAN 4 UNITS: 100 INJECTION, SOLUTION PARENTERAL at 21:22

## 2020-11-14 RX ADMIN — INSULIN GLARGINE 10 UNITS: 100 INJECTION, SOLUTION SUBCUTANEOUS at 09:02

## 2020-11-14 ASSESSMENT — LIFESTYLE VARIABLES
ALCOHOL_USE: NO
AVERAGE NUMBER OF DAYS PER WEEK YOU HAVE A DRINK CONTAINING ALCOHOL: 0
HOW MANY TIMES IN THE PAST YEAR HAVE YOU HAD 5 OR MORE DRINKS IN A DAY: 0
EVER FELT BAD OR GUILTY ABOUT YOUR DRINKING: NO
EVER HAD A DRINK FIRST THING IN THE MORNING TO STEADY YOUR NERVES TO GET RID OF A HANGOVER: NO
HAVE YOU EVER FELT YOU SHOULD CUT DOWN ON YOUR DRINKING: NO
TOTAL SCORE: 0
TOTAL SCORE: 0
ON A TYPICAL DAY WHEN YOU DRINK ALCOHOL HOW MANY DRINKS DO YOU HAVE: 0
CONSUMPTION TOTAL: NEGATIVE
DOES PATIENT WANT TO STOP DRINKING: NO
TOTAL SCORE: 0
HAVE PEOPLE ANNOYED YOU BY CRITICIZING YOUR DRINKING: NO

## 2020-11-14 ASSESSMENT — ENCOUNTER SYMPTOMS
NEUROLOGICAL NEGATIVE: 1
COUGH: 1
MUSCULOSKELETAL NEGATIVE: 1
GASTROINTESTINAL NEGATIVE: 1
CONSTITUTIONAL NEGATIVE: 1
CARDIOVASCULAR NEGATIVE: 1
EYES NEGATIVE: 1
SHORTNESS OF BREATH: 1
PSYCHIATRIC NEGATIVE: 1

## 2020-11-14 ASSESSMENT — PAIN SCALES - WONG BAKER: WONGBAKER_NUMERICALRESPONSE: DOESN'T HURT AT ALL

## 2020-11-14 ASSESSMENT — PAIN DESCRIPTION - PAIN TYPE: TYPE: ACUTE PAIN

## 2020-11-14 NOTE — PROGRESS NOTES
2 RN skin check complete  Devices in place na.  Skin assessment under devices na.  Confirmed pressure ulcers found on na.  New potential pressure ulcers on na. Wound consult placed.  Following interventions in place na.    Skin clean, dry, and intact. Extra pillows given for support. Patient turns self.

## 2020-11-14 NOTE — H&P
Hospital Medicine History & Physical Note    Date of Service  11/13/2020    Primary Care Physician  Delmi Curiel P.A.-C.    Consultants  Nephrology    Code Status  Full Code    Chief Complaint  Chief Complaint   Patient presents with   • Fever     covid positive on 11/2, continuing symptoms   • Cough       History of Presenting Illness  64 y.o. male who presented 11/13/2020 with presents with persistent fevers and cough.  Of note the patient was recently tested positive for Covid about 2 weeks ago.  He states that the symptoms never really improved.  Of note upon arrival the patient was noted to be septic meeting 2 out of 3 SIRS criteria with tachycardia and tachypnea.  He is immunocompromised given his immunosuppressive medications in the setting of kidney transplant.  At this time he denies any shortness of breath nor generalized weakness.  He denies any chest pains or palpitations.    Review of Systems  Review of Systems   Constitutional: Negative for chills, fever and malaise/fatigue.   HENT: Negative for congestion, hearing loss, sore throat and tinnitus.    Eyes: Negative for blurred vision, double vision, photophobia and discharge.   Respiratory: Negative for cough, hemoptysis, sputum production, shortness of breath and wheezing.    Cardiovascular: Negative for chest pain, palpitations, orthopnea, claudication and leg swelling.   Gastrointestinal: Negative for abdominal pain, blood in stool, diarrhea, heartburn, melena, nausea and vomiting.   Genitourinary: Negative for dysuria, flank pain, hematuria and urgency.   Musculoskeletal: Negative for back pain, joint pain and myalgias.   Skin: Negative for itching and rash.   Neurological: Negative for dizziness, sensory change, speech change, weakness and headaches.   Endo/Heme/Allergies: Does not bruise/bleed easily.   Psychiatric/Behavioral: Negative for depression and suicidal ideas.       Past Medical History   has a past medical history of Benign  essential hypertension, Hyperlipoproteinemia, Hypertension, Pain, Polycystic kidney (9/10/10), Sleep apnea, and Snoring.    Surgical History   has a past surgical history that includes knee arthroplasty total (1/12/07); knee arthroscopy (4/10/06); other orthopedic surgery (7/8/74); other (9/10/10); knee arthroscopy (5/3/2011); medial meniscectomy (5/3/2011); knee unicompartmental (12/23/2011); knee arthroscopy (12/23/2011); and knee manipulation (2/16/2012).     Family History  family history is not on file.     Social History   reports that he has never smoked. He has never used smokeless tobacco. He reports that he does not drink alcohol or use drugs.    Allergies  Allergies   Allergen Reactions   • Doxycycline Rash     Sweats and shakes: 9/28/17: Clarified allergy with patient. Allergy was in 1998 and he doesn't remember what happened. He thought the medication is for pain.  Tolerates doxycycline 9/2017       Medications  Prior to Admission Medications   Prescriptions Last Dose Informant Patient Reported? Taking?   albuterol 108 (90 Base) MCG/ACT Aero Soln inhalation aerosol 11/13/2020 at AM Patient No No   Sig: Inhale 2 Puffs by mouth every 6 hours as needed for Shortness of Breath.   atorvastatin (LIPITOR) 80 MG tablet 11/12/2020 at PM Patient Yes Yes   Sig: Take 80 mg by mouth every bedtime.   azithromycin (ZITHROMAX) 250 MG Tab 11/10/2020 at finished Patient No No   Sig: Take 500 mg day one, 250 mg days 2-5.   benzonatate (TESSALON) 100 MG Cap 11/13/2020 at AM Patient No No   Sig: Take 1 Cap by mouth 3 times a day as needed for Cough.   cholecalciferol (VITAMIN D3) 5000 UNIT Cap 11/13/2020 at AM Patient Yes No   Sig: Take 5,000 Units by mouth every 48 hours.   glyBURIDE (DIABETA) 5 MG Tab 11/13/2020 at AM Patient No No   Sig: Take 2 Tabs by mouth 2 times a day, with meals.   Patient taking differently: Take 5 mg by mouth 2 times a day, with meals.   linagliptin (TRADJENTA) 5 MG TABS tablet 11/13/2020 at AM  Patient No No   Sig: Take 1 Tab by mouth every day.   metoprolol SR (TOPROL XL) 25 MG TABLET SR 24 HR 11/12/2020 at PM Patient No No   Sig: TAKE ONE TABLET BY MOUTH EVERY DAY   mycophenolate (CELLCEPT) 250 MG Cap 11/13/2020 at AM Patient Yes No   Sig: Take 500 mg by mouth 2 times a day.   omeprazole (PRILOSEC) 20 MG Tablet Delayed Response delayed-release tablet 11/13/2020 at AM Patient No No   Sig: Take 1 Tab by mouth every day.   ondansetron (ZOFRAN ODT) 4 MG TABLET DISPERSIBLE Not Taking at Unknown time Patient No No   Sig: Take 1 Tab by mouth every 6 hours as needed.   Patient not taking: Reported on 11/13/2020   pioglitazone (ACTOS) 45 MG Tab 11/13/2020 at AM Patient Yes No   Sig: Take 45 mg by mouth every day.   predniSONE (DELTASONE) 5 MG TABS 11/13/2020 at AM Patient Yes No   Sig: Take 5 mg by mouth every day. Take with food    tacrolimus (PROGRAF) 1 MG CAPS 11/13/2020 at AM Patient Yes No   Sig: Take 1 mg by mouth 2 times a day.      Facility-Administered Medications: None       Physical Exam  Temp:  [37.1 °C (98.8 °F)] 37.1 °C (98.8 °F)  Pulse:  [] 86  Resp:  [17-59] 29  BP: ()/() 162/106  SpO2:  [91 %-96 %] 95 %    Physical Exam  Vitals signs reviewed.   Constitutional:       Appearance: Normal appearance. He is obese.   HENT:      Head: Normocephalic and atraumatic.      Nose: No congestion or rhinorrhea.      Mouth/Throat:      Mouth: Mucous membranes are moist.      Pharynx: Oropharynx is clear.   Eyes:      General: No scleral icterus.     Extraocular Movements: Extraocular movements intact.      Conjunctiva/sclera: Conjunctivae normal.      Pupils: Pupils are equal, round, and reactive to light.   Neck:      Musculoskeletal: Neck supple. No neck rigidity or muscular tenderness.   Cardiovascular:      Rate and Rhythm: Normal rate and regular rhythm.      Heart sounds: No murmur. No friction rub. No gallop.    Pulmonary:      Effort: Pulmonary effort is normal. No respiratory  distress.      Breath sounds: Normal breath sounds. No stridor. No wheezing, rhonchi or rales.   Abdominal:      General: Abdomen is flat. Bowel sounds are normal. There is no distension.      Palpations: Abdomen is soft. There is no mass.      Tenderness: There is no abdominal tenderness. There is no guarding or rebound.   Musculoskeletal:         General: No swelling, tenderness or deformity.   Lymphadenopathy:      Cervical: No cervical adenopathy.   Skin:     General: Skin is warm and dry.      Findings: No erythema or rash.   Neurological:      General: No focal deficit present.      Mental Status: He is alert and oriented to person, place, and time. Mental status is at baseline.      Cranial Nerves: No cranial nerve deficit.      Sensory: No sensory deficit.      Motor: No weakness.   Psychiatric:         Mood and Affect: Mood normal.         Behavior: Behavior normal.         Thought Content: Thought content normal.         Laboratory:  Recent Labs     11/13/20  1235   WBC 7.3   RBC 5.25   HEMOGLOBIN 14.9   HEMATOCRIT 47.5   MCV 90.5   MCH 28.4   MCHC 31.4*   RDW 46.4   PLATELETCT 100*   MPV 12.6     Recent Labs     11/13/20  1235   SODIUM 134*   POTASSIUM 5.4   CHLORIDE 98   CO2 22   GLUCOSE 170*   BUN 56*   CREATININE 2.63*   CALCIUM 9.1     Recent Labs     11/13/20  1235   ALTSGPT 24   ASTSGOT 32   ALKPHOSPHAT 112*   TBILIRUBIN 0.8   GLUCOSE 170*         No results for input(s): NTPROBNP in the last 72 hours.      Recent Labs     11/13/20  1235   TROPONINT 54*       Imaging:  DX-CHEST-PORTABLE (1 VIEW)   Final Result      Patchy bilateral hazy pulmonary opacities is likely in keeping with Covid 19 pneumonia.            Assessment/Plan:  I anticipate this patient will require at least two midnights for appropriate medical management, necessitating inpatient admission.    COVID-19  Assessment & Plan  Patient originally diagnosed with Covid about 2 weeks ago.  He states that his fevers and cough never  improved.  The patient is immunosuppressed.  We will start the patient on Decadron 6 mg daily.    Continue to monitor oxygenation    SANKET (acute kidney injury) (HCC)- (present on admission)  Assessment & Plan  Continue IV volume replacement  Pending nephrology recommendations    Sepsis(995.91)  Assessment & Plan  This is Sepsis Present on admission  SIRS criteria identified on my evaluation include: Tachycardia, with heart rate greater than 90 BPM  Source is COVID  Sepsis protocol initiated  Fluid resuscitation ordered per protocol  IV antibiotics as appropriate for source of sepsis  While organ dysfunction may be noted elsewhere in this problem list or in the chart, degree of organ dysfunction does not meet CMS criteria for severe sepsis          Dyslipidemia- (present on admission)  Assessment & Plan  Continue home dose of atorvastatin  Continue to encourage lifestyle modifications for diet and exercise    Diabetes mellitus (HCC)- (present on admission)  Assessment & Plan  Continue insulin sliding scale and serial Accu-Cheks  Diabetic diet    Chronic kidney disease, stage 2, mildly decreased GFR- (present on admission)  Assessment & Plan  Patient with acute kidney injury  Continue IV volume restrictive sedation  Follow-up with nephrology recommendations    Renal Transplant 9/10/2010 2nd to Polycystic Kidney Disease- (present on admission)  Assessment & Plan  Continue patient's immunosuppressive therapy  Pending further recommendations from nephrology  Continue IV volume replacement for SANKET    Hypertension- (present on admission)  Assessment & Plan  Continue patient's home dose of metoprolol    DVT prophylaxis: Lovenox

## 2020-11-14 NOTE — ED NOTES
Med rec updated per interview with pt at bedside. Allergies reviewed. Pt finished a z-rubén on 11/10/20.

## 2020-11-14 NOTE — CONSULTS
[facility] -  CONSULTATION NOTE               Author: Live Hirsch M.D. Date & Time: 11/13/2020  4:47 PM       REASON FOR CONSULTATION:   SANKET in transplant patient     CHIEF COMPLAINT:   Fever and cough    HISTORY OF PRESENT ILLNESS:    64 y.o. male with a history of ESRD 2/2 PCKD s/p renal transplant in 2010 who presented to the ED with worsening malaise.    Diagnosed with COVID on 11/2, worsening cough, SOB, fatigue recently prompted ED visit where was found to have SANKET on CKD. Last seen in out clinic in August. Baseline cr had been ~1.8 recently. Elevated to 2.6 on admission. Hyperkalemia. Bps 90/50s. CXR, reviewed personally with patchy infiltrates. Discussed with ED physician. Started on IV fluids. Had been on regimen of FK 1mg BID, MMF, prednisone.    REVIEW OF SYSTEMS:    With help of nursing/ER physican  General: +malaise  CV: No chest pain  RESP: +shortness of breath  GI: No abdominal pain   : No dysuria or gross hematuria  MSK: No trauma  Skin: No rashes  Neuro: No tremors  Psych: No depression    PAST MEDICAL HISTORY:   Past Medical History:   Diagnosis Date   • Benign essential hypertension    • Hyperlipoproteinemia    • Hypertension     not on meds anymore   • Pain    • Polycystic kidney 9/10/10    RIGHT KIDNEY TRANSPLANT   • Sleep apnea    • Snoring        PAST SURGICAL HISTORY:      Past Surgical History:   Procedure Laterality Date   • KNEE MANIPULATION  2/16/2012    Performed by LATOYA CONNER at SURGERY [facility]   • KNEE UNICOMPARTMENTAL  12/23/2011    Performed by LATOYA CONNER at SURGERY [facility]   • KNEE ARTHROSCOPY  12/23/2011    Performed by LATOYA CONNER at SURGERY [facility]   • KNEE ARTHROSCOPY  5/3/2011    Performed by HANANE GOLDMAN at SURGERY [facility]   • MEDIAL MENISCECTOMY  5/3/2011    Performed by HANANE GOLDMAN at SURGERY SAME DAY [facility]   • OTHER  9/10/10    RIGHT KIDNEY TRANSPLANT   • KNEE ARTHROPLASTY TOTAL   1/12/07    RIGHT   • KNEE ARTHROSCOPY  4/10/06    RIGHT   • OTHER ORTHOPEDIC SURGERY  7/8/74    LEFT KNEE DEBRIDEMENT       FAMILY HISTORY:   No family history of renal disease    SOCIAL HISTORY:   - No tobacco, No EtOH, No illicits    HOME MEDICATIONS:   No current facility-administered medications on file prior to encounter.      Current Outpatient Medications on File Prior to Encounter   Medication Sig Dispense Refill   • atorvastatin (LIPITOR) 80 MG tablet Take 80 mg by mouth every bedtime.     • benzonatate (TESSALON) 100 MG Cap Take 1 Cap by mouth 3 times a day as needed for Cough. 60 Cap 0   • albuterol 108 (90 Base) MCG/ACT Aero Soln inhalation aerosol Inhale 2 Puffs by mouth every 6 hours as needed for Shortness of Breath. 8.5 g 0   • azithromycin (ZITHROMAX) 250 MG Tab Take 500 mg day one, 250 mg days 2-5. 6 Tab 0   • glyBURIDE (DIABETA) 5 MG Tab Take 2 Tabs by mouth 2 times a day, with meals. (Patient taking differently: Take 5 mg by mouth 2 times a day, with meals.) 180 Tab 1   • omeprazole (PRILOSEC) 20 MG Tablet Delayed Response delayed-release tablet Take 1 Tab by mouth every day. 90 Tab 0   • cholecalciferol (VITAMIN D3) 5000 UNIT Cap Take 5,000 Units by mouth every 48 hours.     • ondansetron (ZOFRAN ODT) 4 MG TABLET DISPERSIBLE Take 1 Tab by mouth every 6 hours as needed. (Patient not taking: Reported on 11/13/2020) 15 Tab 0   • pioglitazone (ACTOS) 45 MG Tab Take 45 mg by mouth every day.     • mycophenolate (CELLCEPT) 250 MG Cap Take 500 mg by mouth 2 times a day.     • metoprolol SR (TOPROL XL) 25 MG TABLET SR 24 HR TAKE ONE TABLET BY MOUTH EVERY DAY 90 Tab 3   • linagliptin (TRADJENTA) 5 MG TABS tablet Take 1 Tab by mouth every day. 30 Tab 11   • tacrolimus (PROGRAF) 1 MG CAPS Take 1 mg by mouth 2 times a day.     • predniSONE (DELTASONE) 5 MG TABS Take 5 mg by mouth every day. Take with food          ALLERGIES:  Doxycycline    PHYSICAL EXAM:  VS:  /85   Pulse 100   Temp 37.1 °C (98.8  "°F)   Resp (!) 24   Ht 1.956 m (6' 5\")   Wt 101.3 kg (223 lb 5.2 oz)   SpO2 93%   BMI 26.48 kg/m²   Deferred, COIVD precautions     LABS:  Recent Results (from the past 24 hour(s))   CBC WITH DIFFERENTIAL    Collection Time: 11/13/20 12:35 PM   Result Value Ref Range    WBC 7.3 4.8 - 10.8 K/uL    RBC 5.25 4.70 - 6.10 M/uL    Hemoglobin 14.9 14.0 - 18.0 g/dL    Hematocrit 47.5 42.0 - 52.0 %    MCV 90.5 81.4 - 97.8 fL    MCH 28.4 27.0 - 33.0 pg    MCHC 31.4 (L) 33.7 - 35.3 g/dL    RDW 46.4 35.9 - 50.0 fL    Platelet Count 100 (L) 164 - 446 K/uL    MPV 12.6 9.0 - 12.9 fL    Neutrophils-Polys 89.30 (H) 44.00 - 72.00 %    Lymphocytes 3.10 (L) 22.00 - 41.00 %    Monocytes 6.90 0.00 - 13.40 %    Eosinophils 0.10 0.00 - 6.90 %    Basophils 0.10 0.00 - 1.80 %    Immature Granulocytes 0.50 0.00 - 0.90 %    Nucleated RBC 0.00 /100 WBC    Neutrophils (Absolute) 6.54 1.82 - 7.42 K/uL    Lymphs (Absolute) 0.23 (L) 1.00 - 4.80 K/uL    Monos (Absolute) 0.51 0.00 - 0.85 K/uL    Eos (Absolute) 0.01 0.00 - 0.51 K/uL    Baso (Absolute) 0.01 0.00 - 0.12 K/uL    Immature Granulocytes (abs) 0.04 0.00 - 0.11 K/uL    NRBC (Absolute) 0.00 K/uL   COMP METABOLIC PANEL    Collection Time: 11/13/20 12:35 PM   Result Value Ref Range    Sodium 134 (L) 135 - 145 mmol/L    Potassium 5.4 3.6 - 5.5 mmol/L    Chloride 98 96 - 112 mmol/L    Co2 22 20 - 33 mmol/L    Anion Gap 14.0 7.0 - 16.0    Glucose 170 (H) 65 - 99 mg/dL    Bun 56 (H) 8 - 22 mg/dL    Creatinine 2.63 (H) 0.50 - 1.40 mg/dL    Calcium 9.1 8.5 - 10.5 mg/dL    AST(SGOT) 32 12 - 45 U/L    ALT(SGPT) 24 2 - 50 U/L    Alkaline Phosphatase 112 (H) 30 - 99 U/L    Total Bilirubin 0.8 0.1 - 1.5 mg/dL    Albumin 4.0 3.2 - 4.9 g/dL    Total Protein 6.7 6.0 - 8.2 g/dL    Globulin 2.7 1.9 - 3.5 g/dL    A-G Ratio 1.5 g/dL   TROPONIN    Collection Time: 11/13/20 12:35 PM   Result Value Ref Range    Troponin T 54 (H) 6 - 19 ng/L   ESTIMATED GFR    Collection Time: 11/13/20 12:35 PM   Result Value " Ref Range    GFR If African American 30 (A) >60 mL/min/1.73 m 2    GFR If Non African American 25 (A) >60 mL/min/1.73 m 2   EKG (NOW)    Collection Time: 20  2:14 PM   Result Value Ref Range    Report       [facility] Emergency Dept.    Test Date:  2020  Pt Name:    CIARA FORRESTER                   Department: ER  MRN:        [id number]                      Room:       [facility]  Gender:     Male                         Technician: [id number]  :        1955                   Requested By:MARIA DEL CARMEN MENJIVAR  Order #:    [id number]                    Reading MD: MARIA DEL CARMEN MENJIVAR MD    Measurements  Intervals                                Axis  Rate:       106                          P:          14  MT:         172                          QRS:        120  QRSD:       143                          T:          -4  QT:         354  QTc:        471    Interpretive Statements  Sinus tachycardia, normal axis  Multiple premature complexes, vent & supraven  RBBB and LPFB  Compared to ECG 2017 10:20:16  Left posterior fascicular block now present  Sinus rhythm no longer present  Ventricular premature complex(es) no longer present  Electronically Signed On 2020 1 5:51:37 PST by MARIA DEL CARMEN MENJIVAR MD     LACTIC ACID    Collection Time: 20  2:18 PM   Result Value Ref Range    Lactic Acid 1.7 0.5 - 2.0 mmol/L       (click the triangle to expand results)    IMAGING:  DX-CHEST-PORTABLE (1 VIEW)   Final Result      Patchy bilateral hazy pulmonary opacities is likely in keeping with Covid 19 pneumonia.          ASSESSMENT:  # SANKET: Baseline Cr~1.8. Suspect pre-renal etiology  # Renal Transplant: . On FK, MMF, pred at home  # CKD Stage 3: baseline cr ~1.8  # COVID  # Hyperkalemia  # DM    PLAN:  -agree with IV fluids  -continue home tacrolimus and pred, hold MMF  -UA  -daily renal function panel  -abx per primary  -Strict I/Os  -Dose all meds per eGFR <30     This patient was seen  under COVID 19 pandemic disaster response conditions.  During a disaster, the provisions of care is subject to the Crisis Standard of Care.    Thank you for this consult, we will continue to follow.    Live Hirsch MD

## 2020-11-14 NOTE — ASSESSMENT & PLAN NOTE
Continue home dose of atorvastatin  Continue to encourage lifestyle modifications for diet and exercise

## 2020-11-14 NOTE — ASSESSMENT & PLAN NOTE
Continue patient's immunosuppressive therapy  Pending further recommendations from nephrology  Continue IV volume replacement for SANKET

## 2020-11-14 NOTE — ASSESSMENT & PLAN NOTE
This is Sepsis Present on admission  SIRS criteria identified on my evaluation include: Tachycardia, with heart rate greater than 90 BPM  Source is COVID  Sepsis protocol initiated  Fluid resuscitation ordered per protocol  IV antibiotics as appropriate for source of sepsis  While organ dysfunction may be noted elsewhere in this problem list or in the chart, degree of organ dysfunction does not meet CMS criteria for severe sepsis  Resolved

## 2020-11-14 NOTE — PROGRESS NOTES
Hospital Medicine Daily Progress Note    Date of Service  11/14/2020    Chief Complaint  64 y.o. male admitted 11/13/2020 with acute renal failure    Hospital Course  64-year-old male with a past medical history of end-stage renal disease secondary to polycystic kidney disease status post transplant (2010) on tacrolimus and mycophenolate was admitted on 11/13/2020 for sepsis secondary to Covid with acute renal failure.  Patient was started on IV hydration which his renal function improved.  Nephrology following case.    Interval Problem Update  Patient was evaluated bedside and found AAO X4, afebrile and in no acute distress  Renal function better today  Patient saturating well on room air  Continue steroids  Labs on a.m.    Consultants/Specialty  Nephrology    Code Status  Full Code    Disposition  TBD    Review of Systems  Review of Systems   Constitutional: Negative.    HENT: Negative.    Eyes: Negative.    Respiratory: Positive for cough and shortness of breath.    Cardiovascular: Negative.    Gastrointestinal: Negative.    Genitourinary: Negative.    Musculoskeletal: Negative.    Skin: Negative.    Neurological: Negative.    Endo/Heme/Allergies: Negative.    Psychiatric/Behavioral: Negative.         Physical Exam  Temp:  [36.6 °C (97.8 °F)-37.1 °C (98.8 °F)] 36.6 °C (97.8 °F)  Pulse:  [] 63  Resp:  [17-59] 20  BP: ()/() 122/68  SpO2:  [90 %-96 %] 93 %    Physical Exam  Constitutional:       Appearance: Normal appearance. He is not ill-appearing.   HENT:      Head: Normocephalic and atraumatic.      Mouth/Throat:      Mouth: Mucous membranes are moist.   Eyes:      Extraocular Movements: Extraocular movements intact.      Pupils: Pupils are equal, round, and reactive to light.   Neck:      Musculoskeletal: Normal range of motion and neck supple.   Cardiovascular:      Rate and Rhythm: Normal rate and regular rhythm.      Pulses: Normal pulses.      Heart sounds: Normal heart sounds. No murmur.    Pulmonary:      Effort: Pulmonary effort is normal. No respiratory distress.      Breath sounds: Normal breath sounds.   Abdominal:      General: Bowel sounds are normal. There is no distension.      Palpations: Abdomen is soft.   Musculoskeletal: Normal range of motion.         General: No swelling.   Skin:     General: Skin is warm.      Coloration: Skin is not jaundiced.   Neurological:      General: No focal deficit present.      Mental Status: He is alert and oriented to person, place, and time. Mental status is at baseline.      Cranial Nerves: No cranial nerve deficit.   Psychiatric:         Mood and Affect: Mood normal.         Behavior: Behavior normal.         Thought Content: Thought content normal.         Judgment: Judgment normal.         Fluids    Intake/Output Summary (Last 24 hours) at 11/14/2020 0754  Last data filed at 11/13/2020 2132  Gross per 24 hour   Intake 120 ml   Output --   Net 120 ml       Laboratory  Recent Labs     11/13/20  1235   WBC 7.3   RBC 5.25   HEMOGLOBIN 14.9   HEMATOCRIT 47.5   MCV 90.5   MCH 28.4   MCHC 31.4*   RDW 46.4   PLATELETCT 100*   MPV 12.6     Recent Labs     11/13/20  1235 11/14/20  0120   SODIUM 134* 134*   POTASSIUM 5.4 5.6*   CHLORIDE 98 102   CO2 22 19*   GLUCOSE 170* 234*   BUN 56* 58*   CREATININE 2.63* 2.20*   CALCIUM 9.1 8.4*                   Imaging  DX-CHEST-PORTABLE (1 VIEW)   Final Result      Patchy bilateral hazy pulmonary opacities is likely in keeping with Covid 19 pneumonia.           Assessment/Plan  COVID-19  Assessment & Plan  On RA  Decadron 6 mg daily.   Continue to monitor oxygenation    SANKET (acute kidney injury) (HCC)- (present on admission)  Assessment & Plan  Creatinine better today  Hold off on IV hydration for now  Nephrology recommendations    Sepsis(995.91)  Assessment & Plan  This is Sepsis Present on admission  SIRS criteria identified on my evaluation include: Tachycardia, with heart rate greater than 90 BPM  Source is  COVID  Sepsis protocol initiated  Fluid resuscitation ordered per protocol  IV antibiotics as appropriate for source of sepsis  While organ dysfunction may be noted elsewhere in this problem list or in the chart, degree of organ dysfunction does not meet CMS criteria for severe sepsis  Resolved        Dyslipidemia- (present on admission)  Assessment & Plan  Continue home dose of atorvastatin  Continue to encourage lifestyle modifications for diet and exercise    Diabetes mellitus (HCC)- (present on admission)  Assessment & Plan  Continue insulin sliding scale and serial Accu-Cheks  Diabetic diet     Chronic kidney disease, stage 2, mildly decreased GFR- (present on admission)  Assessment & Plan  Renal function better today  Nephrology following    Renal Transplant 9/10/2010 2nd to Polycystic Kidney Disease- (present on admission)  Assessment & Plan  Continue patient's immunosuppressive therapy  Pending further recommendations from nephrology  Continue IV volume replacement for SANKET    Hypertension- (present on admission)  Assessment & Plan  Continue patient's home dose of metoprolol       VTE prophylaxis: Heparin

## 2020-11-14 NOTE — PROGRESS NOTES
Patient transported to room. Patient oriented to room, educated on safety precautions which are in place, educated on infection protocol. Patient verbalized understanding. VSS, admission and assessment completed. All questions answered at this time. Call light and personal belonging within reach. Labs drawn, medications administered.

## 2020-11-14 NOTE — PROGRESS NOTES
"[facility] -  PROGRESS NOTE               Author: Live Hirsch M.D. Date & Time: 11/14/2020  8:57 AM     HPI:  64 y.o. male with a history of ESRD 2/2 PCKD s/p renal transplant in 2010 who presented to the ED with worsening malaise.     Diagnosed with COVID on 11/2, worsening cough, SOB, fatigue recently prompted ED visit where was found to have SANKET on CKD. Last seen in out clinic in August. Baseline cr had been ~1.8 recently. Elevated to 2.6 on admission. Hyperkalemia. Bps 90/50s. CXR, reviewed personally with patchy infiltrates. Discussed with ED physician. Started on IV fluids. Had been on regimen of FK 1mg BID, MMF, prednisone.    DAILY NEPHROLOGY SUMMARY:  11/13: Consult Done  11/14: Cr improving, feeling slightly improved overall with room shortness of breath    PAST FAMILY HISTORY: Reviewed and Unchanged  SOCIAL HISTORY: Reviewed and Unchanged  CURRENT MEDICATIONS: Reviewed  IMAGING STUDIES: Reviewed    ROS  General: No malaise  CV: No chest pain  RESP: No shortness of breath  GI: No abdominal pain   : No dysuria or gross hematuria  MSK: No trauma  Skin: No rashes  Neuro: No tremors  Psych: No depression    PHYSICAL EXAM  VS:  /79   Pulse 84   Temp 36.4 °C (97.6 °F) (Temporal)   Resp (!) 21   Ht 1.981 m (6' 6\")   Wt 103.1 kg (227 lb 4.7 oz)   SpO2 94%   BMI 26.27 kg/m²   Deferred, COVID precautions    Fluids:  In: 120 [P.O.:120]  Out: -     LABS:  Recent Results (from the past 24 hour(s))   CBC WITH DIFFERENTIAL    Collection Time: 11/13/20 12:35 PM   Result Value Ref Range    WBC 7.3 4.8 - 10.8 K/uL    RBC 5.25 4.70 - 6.10 M/uL    Hemoglobin 14.9 14.0 - 18.0 g/dL    Hematocrit 47.5 42.0 - 52.0 %    MCV 90.5 81.4 - 97.8 fL    MCH 28.4 27.0 - 33.0 pg    MCHC 31.4 (L) 33.7 - 35.3 g/dL    RDW 46.4 35.9 - 50.0 fL    Platelet Count 100 (L) 164 - 446 K/uL    MPV 12.6 9.0 - 12.9 fL    Neutrophils-Polys 89.30 (H) 44.00 - 72.00 %    Lymphocytes 3.10 (L) 22.00 - 41.00 %    " Monocytes 6.90 0.00 - 13.40 %    Eosinophils 0.10 0.00 - 6.90 %    Basophils 0.10 0.00 - 1.80 %    Immature Granulocytes 0.50 0.00 - 0.90 %    Nucleated RBC 0.00 /100 WBC    Neutrophils (Absolute) 6.54 1.82 - 7.42 K/uL    Lymphs (Absolute) 0.23 (L) 1.00 - 4.80 K/uL    Monos (Absolute) 0.51 0.00 - 0.85 K/uL    Eos (Absolute) 0.01 0.00 - 0.51 K/uL    Baso (Absolute) 0.01 0.00 - 0.12 K/uL    Immature Granulocytes (abs) 0.04 0.00 - 0.11 K/uL    NRBC (Absolute) 0.00 K/uL   COMP METABOLIC PANEL    Collection Time: 20 12:35 PM   Result Value Ref Range    Sodium 134 (L) 135 - 145 mmol/L    Potassium 5.4 3.6 - 5.5 mmol/L    Chloride 98 96 - 112 mmol/L    Co2 22 20 - 33 mmol/L    Anion Gap 14.0 7.0 - 16.0    Glucose 170 (H) 65 - 99 mg/dL    Bun 56 (H) 8 - 22 mg/dL    Creatinine 2.63 (H) 0.50 - 1.40 mg/dL    Calcium 9.1 8.5 - 10.5 mg/dL    AST(SGOT) 32 12 - 45 U/L    ALT(SGPT) 24 2 - 50 U/L    Alkaline Phosphatase 112 (H) 30 - 99 U/L    Total Bilirubin 0.8 0.1 - 1.5 mg/dL    Albumin 4.0 3.2 - 4.9 g/dL    Total Protein 6.7 6.0 - 8.2 g/dL    Globulin 2.7 1.9 - 3.5 g/dL    A-G Ratio 1.5 g/dL   TROPONIN    Collection Time: 20 12:35 PM   Result Value Ref Range    Troponin T 54 (H) 6 - 19 ng/L   ESTIMATED GFR    Collection Time: 20 12:35 PM   Result Value Ref Range    GFR If African American 30 (A) >60 mL/min/1.73 m 2    GFR If Non African American 25 (A) >60 mL/min/1.73 m 2   EKG (NOW)    Collection Time: 20  2:14 PM   Result Value Ref Range    Report       [facility]    Test Date:  2020  Pt Name:    CIARA FORRESTER                   Department: ER  MRN:        [id number]                      Room:        26  Gender:     Male                         Technician: [id number]  :        1955                   Requested By:MARIA DEL CARMEN MENJIVAR  Order #:    [id number]                    Reading MD: MARIA DEL CARMEN MENJIVAR MD    Measurements  Intervals                                 Axis  Rate:       106                          P:          14  IL:         172                          QRS:        120  QRSD:       143                          T:          -4  QT:         354  QTc:        471    Interpretive Statements  Sinus tachycardia, normal axis  Multiple premature complexes, vent & supraven  RBBB and LPFB  Compared to ECG 09/28/2017 10:20:16  Left posterior fascicular block now present  Sinus rhythm no longer present  Ventricular premature complex(es) no longer present  Electronically Signed On 11- 1 5:51:37 PST by MARIA DEL CARMEN MENJIVAR MD     LACTIC ACID    Collection Time: 11/13/20  2:18 PM   Result Value Ref Range    Lactic Acid 1.7 0.5 - 2.0 mmol/L   COVID/SARS CoV-2 PCR    Collection Time: 11/13/20  5:03 PM    Specimen: Nasopharyngeal; Respirate   Result Value Ref Range    COVID Order Status Received    CoV-2, Flu A/B, And RSV by PCR    Collection Time: 11/13/20  5:03 PM   Result Value Ref Range    Influenza virus A RNA Negative Negative    Influenza virus B, PCR Negative Negative    RSV, PCR Negative Negative    SARS-CoV-2 by PCR DETECTED (AA)     SARS-CoV-2 Source NP Swab    ACCU-CHEK GLUCOSE    Collection Time: 11/13/20  8:28 PM   Result Value Ref Range    Glucose - Accu-Ck 268 (H) 65 - 99 mg/dL   Renal Function Panel    Collection Time: 11/14/20  1:20 AM   Result Value Ref Range    Sodium 134 (L) 135 - 145 mmol/L    Potassium 5.6 (H) 3.6 - 5.5 mmol/L    Chloride 102 96 - 112 mmol/L    Co2 19 (L) 20 - 33 mmol/L    Glucose 234 (H) 65 - 99 mg/dL    Creatinine 2.20 (H) 0.50 - 1.40 mg/dL    Bun 58 (H) 8 - 22 mg/dL    Calcium 8.4 (L) 8.5 - 10.5 mg/dL    Phosphorus 1.9 (L) 2.5 - 4.5 mg/dL    Albumin 3.3 3.2 - 4.9 g/dL   ESTIMATED GFR    Collection Time: 11/14/20  1:20 AM   Result Value Ref Range    GFR If  37 (A) >60 mL/min/1.73 m 2    GFR If Non African American 30 (A) >60 mL/min/1.73 m 2   ACCU-CHEK GLUCOSE    Collection Time: 11/14/20  8:12 AM   Result Value  Ref Range    Glucose - Accu-Ck 274 (H) 65 - 99 mg/dL       (click the triangle to expand results)      ASSESSMENT:  # SANKET: Baseline Cr~1.8. Suspect pre-renal etiology  # Renal Transplant: 2010. On FK, MMF, pred at home  # CKD Stage 3: baseline cr ~1.8  # COVID  # Sepsis  # Hyperkalemia  # DM     PLAN:  -Okay to hold on further IV fluids  -continue home tacrolimus, hold MMF  -On Decadron for COVID  -dose of patiromer   -daily renal function panel  -abx per primary  -Strict I/Os  -Dose all meds per eGFR <30      This patient was seen under COVID 19 pandemic disaster response conditions.  During a disaster, the provisions of care is subject to the Crisis Standard of Care.     Thank you for this consult, we will continue to follow.     Live Hirsch MD

## 2020-11-15 VITALS
SYSTOLIC BLOOD PRESSURE: 114 MMHG | WEIGHT: 227.29 LBS | RESPIRATION RATE: 20 BRPM | TEMPERATURE: 98.2 F | DIASTOLIC BLOOD PRESSURE: 75 MMHG | HEART RATE: 65 BPM | OXYGEN SATURATION: 90 % | BODY MASS INDEX: 26.3 KG/M2 | HEIGHT: 78 IN

## 2020-11-15 LAB
ALBUMIN SERPL BCP-MCNC: 3 G/DL (ref 3.2–4.9)
ALBUMIN/GLOB SERPL: 1.4 G/DL
ALP SERPL-CCNC: 84 U/L (ref 30–99)
ALT SERPL-CCNC: 16 U/L (ref 2–50)
ANION GAP SERPL CALC-SCNC: 10 MMOL/L (ref 7–16)
AST SERPL-CCNC: 27 U/L (ref 12–45)
BILIRUB SERPL-MCNC: 0.6 MG/DL (ref 0.1–1.5)
BUN SERPL-MCNC: 54 MG/DL (ref 8–22)
CALCIUM SERPL-MCNC: 7.9 MG/DL (ref 8.5–10.5)
CHLORIDE SERPL-SCNC: 107 MMOL/L (ref 96–112)
CO2 SERPL-SCNC: 20 MMOL/L (ref 20–33)
CREAT SERPL-MCNC: 1.9 MG/DL (ref 0.5–1.4)
GLOBULIN SER CALC-MCNC: 2.1 G/DL (ref 1.9–3.5)
GLUCOSE BLD-MCNC: 267 MG/DL (ref 65–99)
GLUCOSE SERPL-MCNC: 216 MG/DL (ref 65–99)
POTASSIUM SERPL-SCNC: 4.8 MMOL/L (ref 3.6–5.5)
PROT SERPL-MCNC: 5.1 G/DL (ref 6–8.2)
SODIUM SERPL-SCNC: 137 MMOL/L (ref 135–145)

## 2020-11-15 PROCEDURE — 99239 HOSP IP/OBS DSCHRG MGMT >30: CPT | Performed by: STUDENT IN AN ORGANIZED HEALTH CARE EDUCATION/TRAINING PROGRAM

## 2020-11-15 PROCEDURE — 700111 HCHG RX REV CODE 636 W/ 250 OVERRIDE (IP): Performed by: INTERNAL MEDICINE

## 2020-11-15 PROCEDURE — 700111 HCHG RX REV CODE 636 W/ 250 OVERRIDE (IP): Performed by: STUDENT IN AN ORGANIZED HEALTH CARE EDUCATION/TRAINING PROGRAM

## 2020-11-15 PROCEDURE — 80053 COMPREHEN METABOLIC PANEL: CPT

## 2020-11-15 PROCEDURE — 700102 HCHG RX REV CODE 250 W/ 637 OVERRIDE(OP): Performed by: INTERNAL MEDICINE

## 2020-11-15 PROCEDURE — A9270 NON-COVERED ITEM OR SERVICE: HCPCS | Performed by: INTERNAL MEDICINE

## 2020-11-15 PROCEDURE — 82962 GLUCOSE BLOOD TEST: CPT

## 2020-11-15 RX ORDER — DEXAMETHASONE 6 MG/1
6 TABLET ORAL DAILY
Qty: 7 TAB | Refills: 0 | Status: SHIPPED | OUTPATIENT
Start: 2020-11-15 | End: 2020-11-22

## 2020-11-15 RX ORDER — GUAIFENESIN 600 MG/1
600 TABLET, EXTENDED RELEASE ORAL 2 TIMES DAILY PRN
Qty: 28 TAB | Refills: 0 | Status: SHIPPED
Start: 2020-11-15 | End: 2021-01-20

## 2020-11-15 RX ADMIN — HEPARIN SODIUM 5000 UNITS: 5000 INJECTION, SOLUTION INTRAVENOUS; SUBCUTANEOUS at 04:21

## 2020-11-15 RX ADMIN — DEXAMETHASONE SODIUM PHOSPHATE 6 MG: 4 INJECTION, SOLUTION INTRA-ARTICULAR; INTRALESIONAL; INTRAMUSCULAR; INTRAVENOUS; SOFT TISSUE at 04:20

## 2020-11-15 RX ADMIN — GUAIFENESIN 600 MG: 600 TABLET, EXTENDED RELEASE ORAL at 04:20

## 2020-11-15 RX ADMIN — TACROLIMUS 1 MG: 1 CAPSULE ORAL at 04:20

## 2020-11-15 RX ADMIN — INSULIN HUMAN 3 UNITS: 100 INJECTION, SOLUTION PARENTERAL at 09:32

## 2020-11-15 RX ADMIN — INSULIN GLARGINE 10 UNITS: 100 INJECTION, SOLUTION SUBCUTANEOUS at 09:31

## 2020-11-15 ASSESSMENT — PAIN SCALES - WONG BAKER: WONGBAKER_NUMERICALRESPONSE: DOESN'T HURT AT ALL

## 2020-11-15 NOTE — DISCHARGE SUMMARY
Care Transition Team Discharge Planning    Anticipated Discharge Information  Discharge Disposition: Discharged to home/self care (01)      Discharge Plan: Patient is discharging home to his prior living situation, no needs from case management upon discharge.     Анна Mcneil RN,

## 2020-11-15 NOTE — PROGRESS NOTES
Seen  By Nephrology and updated plan of care. Continue on room air. Dry cough. Denies chest pain.

## 2020-11-15 NOTE — DISCHARGE INSTRUCTIONS
Discharge Instructions    Discharged to home by car with relative. Discharged via walking, hospital escort: Yes.  Special equipment needed: Not Applicable    Be sure to schedule a follow-up appointment with your primary care doctor or any specialists as instructed.     Discharge Plan:   Diet Plan: Discussed  Activity Level: Discussed  Confirmed Follow up Appointment: Patient to Call and Schedule Appointment  Confirmed Symptoms Management: Discussed  Medication Reconciliation Updated: Yes  Influenza Vaccine Indication: Patient Refuses    I understand that a diet low in cholesterol, fat, and sodium is recommended for good health. Unless I have been given specific instructions below for another diet, I accept this instruction as my diet prescription.   Other diet: Diabetic Diet    Special Instructions: None    · Is patient discharged on Warfarin / Coumadin?   No     Depression / Suicide Risk    As [facility] are [facility], it is important to learn how to keep safe from harming yourself.    Recognize the warning signs:  · Abrupt changes in personality, positive or negative- including increase in energy   · Giving away possessions  · Change in eating patterns- significant weight changes-  positive or negative  · Change in sleeping patterns- unable to sleep or sleeping all the time   · Unwillingness or inability to communicate  · Depression  · Unusual sadness, discouragement and loneliness  · Talk of wanting to die  · Neglect of personal appearance   · Rebelliousness- reckless behavior  · Withdrawal from people/activities they love  · Confusion- inability to concentrate     If you or a loved one observes any of these behaviors or has concerns about self-harm, here's what you can do:  · Talk about it- your feelings and reasons for harming yourself  · Remove any means that you might use to hurt yourself (examples: pills, rope, extension cords, firearm)  · Get professional help from the community  (Mental Health, Substance Abuse, psychological counseling)  · Do not be alone:Call your Safe Contact- someone whom you trust who will be there for you.  · Call your local CRISIS HOTLINE 099-5129 or 208-318-0106  · Call your local Children's Mobile Crisis Response Team [place] (485) 135-1186 or www.Ducksboard  · Call the toll free National Suicide Prevention Hotlines   · National Suicide Prevention Lifeline 923-628-RYAP (7362)  · National Hope Line Network 800-SUICIDE (709-3289)

## 2020-11-16 NOTE — DISCHARGE SUMMARY
Discharge Summary    CHIEF COMPLAINT ON ADMISSION  Chief Complaint   Patient presents with   • Fever     covid positive on 11/2, continuing symptoms   • Cough       Reason for Admission  cough,fever     Admission Date  11/13/2020    CODE STATUS  Prior    HPI & HOSPITAL COURSE  64-year-old male with a past medical history of end-stage renal disease secondary to polycystic kidney disease status post transplant (2010) on tacrolimus and mycophenolate was admitted on 11/13/2020 for sepsis secondary to Covid with acute renal failure.  Patient was started on IV hydration for which his renal function improved. Nephrology following case who recommended to hold mycophenolate throughout admission and on discharge.  Patient was noted to be positive for Covid and was started on a 10-day Decadron regimen which he is to conclude as an outpatient.  Patient was instructed to return to emergency department symptoms were to worsen.    Therefore, he is discharged in good and stable condition to home with close outpatient follow-up.    The patient recovered much more quickly than anticipated on admission.    Discharge Date  11/15/2020    FOLLOW UP ITEMS POST DISCHARGE  Patient to follow-up with nephrology on December for evaluation of immunosuppressive regiment    DISCHARGE DIAGNOSES  Active Problems:    Renal Transplant 9/10/2010 2nd to Polycystic Kidney Disease POA: Yes      Overview:     Chronic kidney disease, stage 2, mildly decreased GFR POA: Yes    Diabetes mellitus (HCC) POA: Yes      Overview:       Dyslipidemia (Chronic) POA: Yes      Overview:     Sepsis(995.91) POA: Unknown    SANKET (acute kidney injury) (HCC) POA: Yes    COVID-19 POA: Unknown    Hypertension POA: Yes      Overview:   Resolved Problems:    * No resolved hospital problems. *      FOLLOW UP  Future Appointments   Date Time Provider Department Center   1/20/2021  1:00 PM Delmi Curiel P.A.-C. [facility] [place]     Delmi Curiel P.A.-C.  [address]  [facility] [address]  [phone]    In 2 weeks  Follow up when clear from Covid.       MEDICATIONS ON DISCHARGE     Medication List      START taking these medications      Instructions   dexamethasone 6 MG Tabs  Commonly known as: Decadron   Take 1 Tab by mouth every day for 7 doses.  Dose: 6 mg     guaiFENesin  MG Tb12  Commonly known as: MUCINEX   Take 1 Tab by mouth 2 times a day as needed for Cough (productive cough).  Dose: 600 mg        CHANGE how you take these medications      Instructions   glyBURIDE 5 MG Tabs  What changed: how much to take  Commonly known as: DIABETA   Take 2 Tabs by mouth 2 times a day, with meals.  Dose: 10 mg        CONTINUE taking these medications      Instructions   albuterol 108 (90 Base) MCG/ACT Aers inhalation aerosol   Inhale 2 Puffs by mouth every 6 hours as needed for Shortness of Breath.  Dose: 2 Puff     atorvastatin 80 MG tablet  Commonly known as: LIPITOR   Take 80 mg by mouth every bedtime.  Dose: 80 mg     azithromycin 250 MG Tabs  Commonly known as: ZITHROMAX   Take 500 mg day one, 250 mg days 2-5.     benzonatate 100 MG Caps  Commonly known as: TESSALON   Take 1 Cap by mouth 3 times a day as needed for Cough.  Dose: 100 mg     cholecalciferol 5000 UNIT Caps  Commonly known as: VITAMIN D3   Take 5,000 Units by mouth every 48 hours.  Dose: 5,000 Units     linagliptin 5 MG Tabs tablet  Commonly known as: Tradjenta   Take 1 Tab by mouth every day.  Dose: 5 mg     metoprolol SR 25 MG Tb24  Commonly known as: TOPROL XL   Doctor's comments: called to [facility]  TAKE ONE TABLET BY MOUTH EVERY DAY     omeprazole 20 MG Tbec delayed-release tablet  Commonly known as: PRILOSEC   Take 1 Tab by mouth every day.  Dose: 20 mg     pioglitazone 45 MG Tabs  Commonly known as: ACTOS   Take 45 mg by mouth every day.  Dose: 45 mg     predniSONE 5 MG Tabs  Commonly known as: DELTASONE   Take 5 mg by mouth every day. Take with food  Dose: 5 mg     tacrolimus 1 MG Caps  Commonly  known as: PROGRAF   Take 1 mg by mouth 2 times a day.  Dose: 1 mg        STOP taking these medications    mycophenolate 250 MG Caps  Commonly known as: CELLCEPT     ondansetron 4 MG Tbdp  Commonly known as: Zofran ODT            Allergies  Allergies   Allergen Reactions   • Doxycycline Rash     Sweats and shakes: 9/28/17: Clarified allergy with patient. Allergy was in 1998 and he doesn't remember what happened. He thought the medication is for pain.  Tolerates doxycycline 9/2017       DIET  No orders of the defined types were placed in this encounter.      ACTIVITY  As tolerated.  Weight bearing as tolerated    CONSULTATIONS  Nephrology    PROCEDURES  None    LABORATORY  Lab Results   Component Value Date    SODIUM 137 11/15/2020    POTASSIUM 4.8 11/15/2020    CHLORIDE 107 11/15/2020    CO2 20 11/15/2020    GLUCOSE 216 (H) 11/15/2020    BUN 54 (H) 11/15/2020    CREATININE 1.90 (H) 11/15/2020    CREATININE 2.4 (H) 12/18/2006        Lab Results   Component Value Date    WBC 7.3 11/13/2020    HEMOGLOBIN 14.9 11/13/2020    HEMATOCRIT 47.5 11/13/2020    PLATELETCT 100 (L) 11/13/2020        Total time of the discharge process exceeds 35 minutes.

## 2020-11-17 RX ORDER — GABAPENTIN 300 MG/1
300 CAPSULE ORAL 4 TIMES DAILY
Qty: 120 CAP | Refills: 1 | Status: SHIPPED | OUTPATIENT
Start: 2020-11-17 | End: 2021-01-18

## 2020-11-17 NOTE — TELEPHONE ENCOUNTER
Delmi Curiel P.A.-C.  to Me      Can we call patient to verify if he is on this medication 3 or 4 times a day?     Thank you,     Delmi Curiel PA-C

## 2020-12-01 ENCOUNTER — OFFICE VISIT (OUTPATIENT)
Dept: URGENT CARE | Facility: PHYSICIAN GROUP | Age: 65
End: 2020-12-01
Payer: COMMERCIAL

## 2020-12-01 VITALS
BODY MASS INDEX: 26.27 KG/M2 | HEIGHT: 78 IN | OXYGEN SATURATION: 92 % | HEART RATE: 89 BPM | TEMPERATURE: 97.4 F | SYSTOLIC BLOOD PRESSURE: 98 MMHG | WEIGHT: 227 LBS | DIASTOLIC BLOOD PRESSURE: 66 MMHG

## 2020-12-01 DIAGNOSIS — J01.00 ACUTE NON-RECURRENT MAXILLARY SINUSITIS: ICD-10-CM

## 2020-12-01 PROCEDURE — 99214 OFFICE O/P EST MOD 30 MIN: CPT | Performed by: PHYSICIAN ASSISTANT

## 2020-12-01 RX ORDER — AMOXICILLIN AND CLAVULANATE POTASSIUM 875; 125 MG/1; MG/1
1 TABLET, FILM COATED ORAL 2 TIMES DAILY
Qty: 20 TAB | Refills: 0 | Status: SHIPPED
Start: 2020-12-01 | End: 2021-01-20

## 2020-12-01 RX ORDER — ALBUTEROL SULFATE 90 UG/1
2 AEROSOL, METERED RESPIRATORY (INHALATION) EVERY 6 HOURS PRN
Qty: 8.5 G | Refills: 0 | Status: SHIPPED
Start: 2020-12-01 | End: 2021-01-20

## 2020-12-01 ASSESSMENT — ENCOUNTER SYMPTOMS
SORE THROAT: 1
MUSCULOSKELETAL NEGATIVE: 1
COUGH: 1
CARDIOVASCULAR NEGATIVE: 1
DIZZINESS: 0
SHORTNESS OF BREATH: 0
SINUS PAIN: 1
CHILLS: 0
WHEEZING: 0
SINUS PRESSURE: 1
GASTROINTESTINAL NEGATIVE: 1
FEVER: 0
HEADACHES: 1
SWOLLEN GLANDS: 1

## 2020-12-01 ASSESSMENT — FIBROSIS 4 INDEX: FIB4 SCORE: 4.32

## 2020-12-01 NOTE — PROGRESS NOTES
Subjective:      Jama Altman is a 65 y.o. male who presents with Congestion (nasal congestion, green mucus, productive cough )            Patient Covid+ 1-month ago.  Was admitted for 3 days due to decreased kidney function.  Patient states he is feeling much better denies further shortness of breath or chest pain.  He still has a mild lingering cough.  His main concern is his sinus congestion, facial pain, colored nasal discharge.    Sinus Problem  This is a new problem. The current episode started 1 to 4 weeks ago. The problem is unchanged. There has been no fever. The fever has been present for less than 1 day. Associated symptoms include congestion, coughing, headaches, sinus pressure, a sore throat and swollen glands. Pertinent negatives include no chills, ear pain or shortness of breath. Past treatments include nothing. The treatment provided no relief.       PMH:  has a past medical history of Benign essential hypertension, Hyperlipoproteinemia, Hypertension, Pain, Polycystic kidney (9/10/10), Sleep apnea, and Snoring.  MEDS:   Current Outpatient Medications:   •  gabapentin (NEURONTIN) 300 MG Cap, Take 1 Cap by mouth 4 times a day., Disp: 120 Cap, Rfl: 1  •  guaiFENesin ER (MUCINEX) 600 MG TABLET SR 12 HR, Take 1 Tab by mouth 2 times a day as needed for Cough (productive cough)., Disp: 28 Tab, Rfl: 0  •  atorvastatin (LIPITOR) 80 MG tablet, Take 80 mg by mouth every bedtime., Disp: , Rfl:   •  benzonatate (TESSALON) 100 MG Cap, Take 1 Cap by mouth 3 times a day as needed for Cough., Disp: 60 Cap, Rfl: 0  •  albuterol 108 (90 Base) MCG/ACT Aero Soln inhalation aerosol, Inhale 2 Puffs by mouth every 6 hours as needed for Shortness of Breath., Disp: 8.5 g, Rfl: 0  •  omeprazole (PRILOSEC) 20 MG Tablet Delayed Response delayed-release tablet, Take 1 Tab by mouth every day., Disp: 90 Tab, Rfl: 0  •  cholecalciferol (VITAMIN D3) 5000 UNIT Cap, Take 5,000 Units by mouth every 48 hours., Disp: , Rfl:   •   pioglitazone (ACTOS) 45 MG Tab, Take 45 mg by mouth every day., Disp: , Rfl:   •  metoprolol SR (TOPROL XL) 25 MG TABLET SR 24 HR, TAKE ONE TABLET BY MOUTH EVERY DAY, Disp: 90 Tab, Rfl: 3  •  linagliptin (TRADJENTA) 5 MG TABS tablet, Take 1 Tab by mouth every day., Disp: 30 Tab, Rfl: 11  •  tacrolimus (PROGRAF) 1 MG CAPS, Take 1 mg by mouth 2 times a day., Disp: , Rfl:   •  predniSONE (DELTASONE) 5 MG TABS, Take 5 mg by mouth every day. Take with food , Disp: , Rfl:   •  azithromycin (ZITHROMAX) 250 MG Tab, Take 500 mg day one, 250 mg days 2-5. (Patient not taking: Reported on 12/1/2020), Disp: 6 Tab, Rfl: 0  •  glyBURIDE (DIABETA) 5 MG Tab, Take 2 Tabs by mouth 2 times a day, with meals. (Patient taking differently: Take 5 mg by mouth 2 times a day, with meals.), Disp: 180 Tab, Rfl: 1  ALLERGIES:   Allergies   Allergen Reactions   • Doxycycline Rash     Sweats and shakes: 9/28/17: Clarified allergy with patient. Allergy was in 1998 and he doesn't remember what happened. He thought the medication is for pain.  Tolerates doxycycline 9/2017     SURGHX:   Past Surgical History:   Procedure Laterality Date   • KNEE MANIPULATION  2/16/2012    Performed by LATOYA CONNER at SURGERY [facility]S   • KNEE UNICOMPARTMENTAL  12/23/2011    Performed by LATOYA CONNER at SURGERY [facility]   • KNEE ARTHROSCOPY  12/23/2011    Performed by LATOYA CONNER at SURGERY [facility]   • KNEE ARTHROSCOPY  5/3/2011    Performed by HANANE GOLDMAN at SURGERY SAME DAY [facility]   • MEDIAL MENISCECTOMY  5/3/2011    Performed by HANANE GOLDMAN at SURGERY SAME DAY [facility]   • OTHER  9/10/10    RIGHT KIDNEY TRANSPLANT   • KNEE ARTHROPLASTY TOTAL  1/12/07    RIGHT   • KNEE ARTHROSCOPY  4/10/06    RIGHT   • OTHER ORTHOPEDIC SURGERY  7/8/74    LEFT KNEE DEBRIDEMENT     SOCHX:  reports that he has never smoked. He has never used smokeless tobacco. He reports that he does not drink alcohol or use drugs.  FH: family history is  "not on file.    Review of Systems   Constitutional: Negative for chills and fever.   HENT: Positive for congestion, sinus pressure, sinus pain and sore throat. Negative for ear pain.    Respiratory: Positive for cough. Negative for shortness of breath and wheezing.    Cardiovascular: Negative.    Gastrointestinal: Negative.    Musculoskeletal: Negative.    Neurological: Positive for headaches. Negative for dizziness.       Medications, Allergies, and current problem list reviewed today in Epic     Objective:     BP (!) 98/66   Pulse 89   Temp 36.3 °C (97.4 °F)   Ht 1.969 m (6' 5.5\")   Wt 103 kg (227 lb)   SpO2 92%   BMI 26.57 kg/m²      Physical Exam  Vitals signs and nursing note reviewed.   Constitutional:       General: He is not in acute distress.     Appearance: He is well-developed. He is not diaphoretic.   HENT:      Head: Normocephalic and atraumatic.      Right Ear: Hearing, tympanic membrane and external ear normal.      Left Ear: Hearing, tympanic membrane and external ear normal.      Nose: Congestion and rhinorrhea present.      Right Sinus: Maxillary sinus tenderness present.      Left Sinus: Maxillary sinus tenderness present.      Mouth/Throat:      Dentition: Normal dentition. No dental caries.      Pharynx: No oropharyngeal exudate or posterior oropharyngeal erythema.   Eyes:      General: No scleral icterus.        Right eye: No discharge.         Left eye: No discharge.      Conjunctiva/sclera: Conjunctivae normal.      Pupils: Pupils are equal, round, and reactive to light.   Neck:      Musculoskeletal: Normal range of motion and neck supple.   Cardiovascular:      Rate and Rhythm: Normal rate and regular rhythm.      Heart sounds: Normal heart sounds.   Pulmonary:      Effort: Pulmonary effort is normal. No respiratory distress.      Breath sounds: Normal breath sounds. No wheezing, rhonchi or rales.   Musculoskeletal:      Right lower leg: No edema.      Left lower leg: No edema. "   Lymphadenopathy:      Head:      Right side of head: Submandibular adenopathy present.      Left side of head: Submandibular adenopathy present.      Cervical: No cervical adenopathy.      Right cervical: No superficial cervical adenopathy.     Left cervical: No superficial cervical adenopathy.   Skin:     General: Skin is warm and dry.      Nails: There is no clubbing.     Neurological:      Mental Status: He is alert and oriented to person, place, and time.   Psychiatric:         Behavior: Behavior normal.         Thought Content: Thought content normal.         Judgment: Judgment normal.                 Assessment/Plan:     1. Acute non-recurrent maxillary sinusitis  amoxicillin-clavulanate (AUGMENTIN) 875-125 MG Tab    albuterol 108 (90 Base) MCG/ACT Aero Soln inhalation aerosol     1 month removed from Covid admission.  He denies further shortness of breath, chest pain or fevers.  Mainly upper respiratory/sinusitis type symptoms.  On exam his vital signs are normal and his lungs are clear bilateral without wheezes rhonchi or rales.  He will be treated for a acute sinusitis.  Should his symptoms worsen or change she will return to clinic for further work-up including chest x-ray.  Take full course of antibiotic  Tylenol and Motrin for fever and pain  OTC meds as discussed including oral decongestant if tolerated  Increase fluids and rest  Nasal spray, nasal wash, Hiral pot    Return to clinic or go to ED if symptoms worsen or persist. Indications for ED discussed at length. Patient/Parent/Guardian voices understanding. Follow-up with your primary care provider in 3-5 days. Red flag symptoms discussed. All side effects of medication discussed including allergic response, GI upset, tendon injury, rash etc.    Please note that this dictation was created using voice recognition software. I have made every reasonable attempt to correct obvious errors, but I expect that there are errors of grammar and possibly  content that I did not discover before finalizing the note.

## 2020-12-08 ENCOUNTER — HOSPITAL ENCOUNTER (OUTPATIENT)
Dept: LAB | Facility: MEDICAL CENTER | Age: 65
End: 2020-12-08
Attending: PHYSICIAN ASSISTANT
Payer: COMMERCIAL

## 2020-12-08 ENCOUNTER — HOSPITAL ENCOUNTER (OUTPATIENT)
Dept: LAB | Facility: MEDICAL CENTER | Age: 65
End: 2020-12-08
Attending: NURSE PRACTITIONER
Payer: COMMERCIAL

## 2020-12-08 DIAGNOSIS — E78.5 HYPERLIPOPROTEINEMIA: Chronic | ICD-10-CM

## 2020-12-08 DIAGNOSIS — E11.9 TYPE 2 DIABETES MELLITUS WITHOUT COMPLICATION, WITHOUT LONG-TERM CURRENT USE OF INSULIN (HCC): ICD-10-CM

## 2020-12-08 DIAGNOSIS — Z79.899 HIGH RISK MEDICATION USE: ICD-10-CM

## 2020-12-08 DIAGNOSIS — Z00.00 GENERAL MEDICAL EXAM: ICD-10-CM

## 2020-12-08 LAB
ALBUMIN SERPL BCP-MCNC: 3.4 G/DL (ref 3.2–4.9)
ALBUMIN/GLOB SERPL: 1.2 G/DL
ALP SERPL-CCNC: 84 U/L (ref 30–99)
ALT SERPL-CCNC: 15 U/L (ref 2–50)
ANION GAP SERPL CALC-SCNC: 5 MMOL/L (ref 7–16)
APPEARANCE UR: CLEAR
AST SERPL-CCNC: 10 U/L (ref 12–45)
BASOPHILS # BLD AUTO: 0.5 % (ref 0–1.8)
BASOPHILS # BLD: 0.02 K/UL (ref 0–0.12)
BILIRUB SERPL-MCNC: 0.4 MG/DL (ref 0.1–1.5)
BILIRUB UR QL STRIP.AUTO: NEGATIVE
BUN SERPL-MCNC: 38 MG/DL (ref 8–22)
CALCIUM SERPL-MCNC: 9.2 MG/DL (ref 8.5–10.5)
CHLORIDE SERPL-SCNC: 106 MMOL/L (ref 96–112)
CHOLEST SERPL-MCNC: 116 MG/DL (ref 100–199)
CHOLEST SERPL-MCNC: 119 MG/DL (ref 100–199)
CO2 SERPL-SCNC: 22 MMOL/L (ref 20–33)
COLOR UR: YELLOW
CREAT SERPL-MCNC: 1.58 MG/DL (ref 0.5–1.4)
CREAT UR-MCNC: 71.68 MG/DL
EOSINOPHIL # BLD AUTO: 0.12 K/UL (ref 0–0.51)
EOSINOPHIL NFR BLD: 3 % (ref 0–6.9)
ERYTHROCYTE [DISTWIDTH] IN BLOOD BY AUTOMATED COUNT: 49.6 FL (ref 35.9–50)
EST. AVERAGE GLUCOSE BLD GHB EST-MCNC: 240 MG/DL
EST. AVERAGE GLUCOSE BLD GHB EST-MCNC: 249 MG/DL
FASTING STATUS PATIENT QL REPORTED: NORMAL
FASTING STATUS PATIENT QL REPORTED: NORMAL
GLOBULIN SER CALC-MCNC: 2.8 G/DL (ref 1.9–3.5)
GLUCOSE SERPL-MCNC: 200 MG/DL (ref 65–99)
GLUCOSE UR STRIP.AUTO-MCNC: 250 MG/DL
HBA1C MFR BLD: 10 % (ref 0–5.6)
HBA1C MFR BLD: 10.3 % (ref 0–5.6)
HCT VFR BLD AUTO: 39.6 % (ref 42–52)
HDLC SERPL-MCNC: 44 MG/DL
HDLC SERPL-MCNC: 44 MG/DL
HGB BLD-MCNC: 12 G/DL (ref 14–18)
IMM GRANULOCYTES # BLD AUTO: 0.02 K/UL (ref 0–0.11)
IMM GRANULOCYTES NFR BLD AUTO: 0.5 % (ref 0–0.9)
KETONES UR STRIP.AUTO-MCNC: NEGATIVE MG/DL
LDLC SERPL CALC-MCNC: 42 MG/DL
LDLC SERPL CALC-MCNC: 45 MG/DL
LEUKOCYTE ESTERASE UR QL STRIP.AUTO: NEGATIVE
LYMPHOCYTES # BLD AUTO: 0.55 K/UL (ref 1–4.8)
LYMPHOCYTES NFR BLD: 13.9 % (ref 22–41)
MAGNESIUM SERPL-MCNC: 1.6 MG/DL (ref 1.5–2.5)
MCH RBC QN AUTO: 27.9 PG (ref 27–33)
MCHC RBC AUTO-ENTMCNC: 30.3 G/DL (ref 33.7–35.3)
MCV RBC AUTO: 92.1 FL (ref 81.4–97.8)
MICRO URNS: ABNORMAL
MONOCYTES # BLD AUTO: 0.41 K/UL (ref 0–0.85)
MONOCYTES NFR BLD AUTO: 10.3 % (ref 0–13.4)
NEUTROPHILS # BLD AUTO: 2.85 K/UL (ref 1.82–7.42)
NEUTROPHILS NFR BLD: 71.8 % (ref 44–72)
NITRITE UR QL STRIP.AUTO: NEGATIVE
NRBC # BLD AUTO: 0 K/UL
NRBC BLD-RTO: 0 /100 WBC
PH UR STRIP.AUTO: 6 [PH] (ref 5–8)
PLATELET # BLD AUTO: 112 K/UL (ref 164–446)
PMV BLD AUTO: 12 FL (ref 9–12.9)
POTASSIUM SERPL-SCNC: 4.6 MMOL/L (ref 3.6–5.5)
PROT SERPL-MCNC: 6.2 G/DL (ref 6–8.2)
PROT UR QL STRIP: NEGATIVE MG/DL
PROT UR-MCNC: 6 MG/DL (ref 0–15)
PROT/CREAT UR: 84 MG/G (ref 15–68)
RBC # BLD AUTO: 4.3 M/UL (ref 4.7–6.1)
RBC UR QL AUTO: NEGATIVE
SODIUM SERPL-SCNC: 133 MMOL/L (ref 135–145)
SP GR UR STRIP.AUTO: 1.02
TRIGL SERPL-MCNC: 148 MG/DL (ref 0–149)
TRIGL SERPL-MCNC: 152 MG/DL (ref 0–149)
TSH SERPL DL<=0.005 MIU/L-ACNC: 2.26 UIU/ML (ref 0.38–5.33)
URATE SERPL-MCNC: 6.7 MG/DL (ref 2.5–8.3)
UROBILINOGEN UR STRIP.AUTO-MCNC: 0.2 MG/DL
WBC # BLD AUTO: 4 K/UL (ref 4.8–10.8)

## 2020-12-08 PROCEDURE — 85025 COMPLETE CBC W/AUTO DIFF WBC: CPT

## 2020-12-08 PROCEDURE — 80061 LIPID PANEL: CPT | Mod: 91

## 2020-12-08 PROCEDURE — 80061 LIPID PANEL: CPT

## 2020-12-08 PROCEDURE — 83036 HEMOGLOBIN GLYCOSYLATED A1C: CPT

## 2020-12-08 PROCEDURE — 80197 ASSAY OF TACROLIMUS: CPT

## 2020-12-08 PROCEDURE — 84156 ASSAY OF PROTEIN URINE: CPT

## 2020-12-08 PROCEDURE — 80053 COMPREHEN METABOLIC PANEL: CPT

## 2020-12-08 PROCEDURE — 83735 ASSAY OF MAGNESIUM: CPT

## 2020-12-08 PROCEDURE — 84443 ASSAY THYROID STIM HORMONE: CPT

## 2020-12-08 PROCEDURE — 81003 URINALYSIS AUTO W/O SCOPE: CPT

## 2020-12-08 PROCEDURE — 36415 COLL VENOUS BLD VENIPUNCTURE: CPT

## 2020-12-08 PROCEDURE — 84550 ASSAY OF BLOOD/URIC ACID: CPT

## 2020-12-08 PROCEDURE — 82570 ASSAY OF URINE CREATININE: CPT

## 2020-12-08 PROCEDURE — 83036 HEMOGLOBIN GLYCOSYLATED A1C: CPT | Mod: 91

## 2020-12-09 ENCOUNTER — TELEPHONE (OUTPATIENT)
Dept: MEDICAL GROUP | Facility: PHYSICIAN GROUP | Age: 65
End: 2020-12-09

## 2020-12-09 RX ORDER — GLYBURIDE 5 MG/1
10 TABLET ORAL 2 TIMES DAILY WITH MEALS
Qty: 180 TAB | Refills: 1 | Status: SHIPPED | OUTPATIENT
Start: 2020-12-09 | End: 2021-04-01

## 2020-12-10 LAB — TACROLIMUS BLD-MCNC: 10.7 NG/ML

## 2021-01-18 RX ORDER — GABAPENTIN 300 MG/1
CAPSULE ORAL
Qty: 360 CAP | Refills: 1 | Status: SHIPPED | OUTPATIENT
Start: 2021-01-18 | End: 2021-07-19

## 2021-01-19 ENCOUNTER — TELEPHONE (OUTPATIENT)
Dept: MEDICAL GROUP | Facility: PHYSICIAN GROUP | Age: 66
End: 2021-01-19

## 2021-01-19 DIAGNOSIS — H26.9 CATARACT, UNSPECIFIED CATARACT TYPE, UNSPECIFIED LATERALITY: ICD-10-CM

## 2021-01-19 DIAGNOSIS — M25.559 HIP PAIN: ICD-10-CM

## 2021-01-19 NOTE — ASSESSMENT & PLAN NOTE
GFR improved from previous labs from 36 now at 44.  Creatinine improved as well from 1.90 now at 1.58, and BUN went from 54 now to 38.  Patient is followed by nephrology.

## 2021-01-19 NOTE — ASSESSMENT & PLAN NOTE
Patient had a recent SANKET, secondary infection with COVID-19 and also suffered from a sinus infection.  But submitted into the hospital for 3 days.

## 2021-01-19 NOTE — ASSESSMENT & PLAN NOTE
Hemoglobin A1c went from 6.4 now at 10.0.  Currently on Actos 45 mg daily, glyburide 10 mg twice daily, Tradjenta 5 mg daily.  Discussed referral to endocrinology for consult.

## 2021-01-19 NOTE — ASSESSMENT & PLAN NOTE
Tacrolimus level at 10.7, goal with 3 months or older is 5.0-15.0.  Therapeutic.  Followed by nephrology.  Patient is continued on tacrolimus and CellCept, and prednisone daily.

## 2021-01-19 NOTE — TELEPHONE ENCOUNTER
Jama stated that he is requesting a referral for Hip (Joint Pain) and Cataract Surgery?    Please sign or advise other wise

## 2021-01-19 NOTE — ASSESSMENT & PLAN NOTE
This is a chronic condition.   Latest Labs:   Lab Results   Component Value Date/Time    CHOLSTRLTOT 119 12/08/2020 07:41 AM    LDL 45 12/08/2020 07:41 AM    HDL 44 12/08/2020 07:41 AM    TRIGLYCERIDE 152 (H) 12/08/2020 07:41 AM      Medications: Atorvastatin 80 mg  Medication side effects: None      Just borderline triglycerides 1 make no changes at this time.

## 2021-01-20 ENCOUNTER — OFFICE VISIT (OUTPATIENT)
Dept: MEDICAL GROUP | Facility: PHYSICIAN GROUP | Age: 66
End: 2021-01-20
Payer: COMMERCIAL

## 2021-01-20 VITALS
BODY MASS INDEX: 28.23 KG/M2 | RESPIRATION RATE: 14 BRPM | SYSTOLIC BLOOD PRESSURE: 120 MMHG | DIASTOLIC BLOOD PRESSURE: 76 MMHG | OXYGEN SATURATION: 96 % | WEIGHT: 244 LBS | HEIGHT: 78 IN | HEART RATE: 68 BPM | TEMPERATURE: 97.5 F

## 2021-01-20 DIAGNOSIS — E78.5 HYPERLIPOPROTEINEMIA: Chronic | ICD-10-CM

## 2021-01-20 DIAGNOSIS — D63.8 ANEMIA OF CHRONIC DISEASE: ICD-10-CM

## 2021-01-20 DIAGNOSIS — M10.9 GOUT, UNSPECIFIED CAUSE, UNSPECIFIED CHRONICITY, UNSPECIFIED SITE: ICD-10-CM

## 2021-01-20 DIAGNOSIS — Z94.0 HISTORY OF RENAL TRANSPLANT: ICD-10-CM

## 2021-01-20 DIAGNOSIS — M25.551 RIGHT HIP PAIN: ICD-10-CM

## 2021-01-20 DIAGNOSIS — E11.9 TYPE 2 DIABETES MELLITUS WITHOUT COMPLICATION, WITHOUT LONG-TERM CURRENT USE OF INSULIN (HCC): ICD-10-CM

## 2021-01-20 DIAGNOSIS — U07.1 COVID-19: ICD-10-CM

## 2021-01-20 DIAGNOSIS — N18.2 CHRONIC KIDNEY DISEASE, STAGE 2, MILDLY DECREASED GFR: ICD-10-CM

## 2021-01-20 DIAGNOSIS — Z23 NEED FOR VACCINATION: ICD-10-CM

## 2021-01-20 PROCEDURE — 90471 IMMUNIZATION ADMIN: CPT | Performed by: PHYSICIAN ASSISTANT

## 2021-01-20 PROCEDURE — 90732 PPSV23 VACC 2 YRS+ SUBQ/IM: CPT | Performed by: PHYSICIAN ASSISTANT

## 2021-01-20 PROCEDURE — 99214 OFFICE O/P EST MOD 30 MIN: CPT | Mod: 25,CS | Performed by: PHYSICIAN ASSISTANT

## 2021-01-20 RX ORDER — NICOTINE POLACRILEX 4 MG/1
20 GUM, CHEWING ORAL DAILY
Qty: 90 TAB | Refills: 3 | Status: SHIPPED
Start: 2021-01-20 | End: 2021-02-18

## 2021-01-20 ASSESSMENT — FIBROSIS 4 INDEX: FIB4 SCORE: 1.475422227126635004

## 2021-01-20 ASSESSMENT — PATIENT HEALTH QUESTIONNAIRE - PHQ9: CLINICAL INTERPRETATION OF PHQ2 SCORE: 0

## 2021-01-20 NOTE — PROGRESS NOTES
CC:   Chief Complaint   Patient presents with   • Lab Results   • Diabetes Mellitus          HISTORY OF PRESENT ILLNESS: Patient is a 65 y.o. male established patient who presents today to follow up on the following conditions:       Health Maintenance: Completed  Chickenpox as child, had zostavax, print shingrix.   Give PPSV23, had prevnar-13 2017  Discussed Hep B vaccine- no high risk, will hold for now.     Gout  Uric acid 6.7 labs 12/20. Stable.     Anemia of chronic disease      Patient had a recent SANKET, secondary infection with COVID-19 and also suffered from a sinus infection.  But submitted into the hospital for 3 days.     Dyslipidemia  This is a chronic condition.   Latest Labs:   Lab Results   Component Value Date/Time    CHOLSTRLTOT 119 12/08/2020 07:41 AM    LDL 45 12/08/2020 07:41 AM    HDL 44 12/08/2020 07:41 AM    TRIGLYCERIDE 152 (H) 12/08/2020 07:41 AM      Medications: Atorvastatin 80 mg  Medication side effects: None      Just borderline triglycerides 1 make no changes at this time.    Type 2 diabetes mellitus without complication, without long-term current use of insulin ([facility])  Hemoglobin A1c went from 6.4 now at 10.0.  Currently on Actos 45 mg daily, glyburide 10 mg twice daily, Tradjenta 5 mg daily.  Discussed referral to endocrinology for consult.    Chronic kidney disease, stage 2, mildly decreased GFR  GFR improved from previous labs from 36 now at 44.  Creatinine improved as well from 1.90 now at 1.58, and BUN went from 54 now to 38.  Patient is followed by nephrology.    Renal Transplant 9/10/2010 2nd to Polycystic Kidney Disease  Tacrolimus level at 10.7, goal with 3 months or older is 5.0-15.0.  Therapeutic.  Followed by nephrology.  Patient is continued on tacrolimus and CellCept, and prednisone daily.    COVID-19  Since his last visit the patient was admitted into the hospital for COVID-19 infection, and decreased kidney function.  On December 1, 2020 he was seen in urgent care and  states he was feeling much better he had a mild lingering cough at that time.  He did though complain of sinus congestion facial pain and nasal discharge.  Patient was put on Augmentin    Right hip pain  Going to see Dr. Luna on Monday, has had 2 1/2 weeks. Staying consistently sore. Aggravated with riding his bike. Sometimes achy down his right leg. No buttocks pain, no lower back pain.  Doesn't hurt as much when he walks.       Patient Active Problem List    Diagnosis Date Noted   • CAD (coronary artery disease) 07/08/2013     Priority: Medium   • Hypertension 07/09/2018     Priority: Low   • Right hip pain 01/20/2021   • COVID-19 11/13/2020   • Thrombocytopenia ([facility]) 10/20/2020   • GERD (gastroesophageal reflux disease) 07/09/2018   • Polycystic kidney 07/09/2018   • Gout 07/09/2018   • Anemia of chronic disease 09/28/2017   • Pneumonia 09/27/2017   • Sepsis(995.91) 09/27/2017   • SANKET (acute kidney injury) ([facility]) 09/27/2017   • Weakness 03/04/2014   • Type 2 diabetes mellitus without complication, without long-term current use of insulin ([facility]) 01/09/2014   • S/P insertion of non-drug eluting coronary artery stent 07/08/2013   • PAF (paroxysmal atrial fibrillation) ([facility]) 07/08/2013   • Dyslipidemia    • Myocardial infarct ([facility]) 06/26/2013   • Renal Transplant 9/10/2010 2nd to Polycystic Kidney Disease 06/26/2013   • Chronic kidney disease, stage 2, mildly decreased GFR 06/26/2013   • Diabetes mellitus ([facility]) 06/26/2013      Allergies:Doxycycline    Current Outpatient Medications   Medication Sig Dispense Refill   • Zoster Vac Recomb Adjuvanted (SHINGRIX) 50 MCG/0.5ML Recon Susp Inject 0.5 mL into the shoulder, thigh, or buttocks one time for 1 dose. 0.5 mL 0   • omeprazole (PRILOSEC) 20 MG Tablet Delayed Response delayed-release tablet Take 1 Tab by mouth every day. 90 Tab 3   • gabapentin (NEURONTIN) 300 MG Cap TAKE ONE CAPSULE BY MOUTH FOUR TIMES DAILY 360 Cap 1   • predniSONE (DELTASONE) 5 MG Tab Take 1 Tab by  "mouth every day. Take with food 90 Tab 1   • atorvastatin (LIPITOR) 80 MG tablet Take 1 Tab by mouth every bedtime. 100 Tab 1   • glyBURIDE (DIABETA) 5 MG Tab Take 2 Tabs by mouth 2 times a day, with meals. 180 Tab 1   • pioglitazone (ACTOS) 45 MG Tab Take 1 Tab by mouth every day. 90 Tab 1   • cholecalciferol (VITAMIN D3) 5000 UNIT Cap Take 5,000 Units by mouth every 48 hours.     • linagliptin (TRADJENTA) 5 MG TABS tablet Take 1 Tab by mouth every day. 30 Tab 11   • tacrolimus (PROGRAF) 1 MG CAPS Take 1 mg by mouth 2 times a day.     • metoprolol (LOPRESSOR) 25 MG Tab Take 1 Tab by mouth every day. 90 Tab 1     No current facility-administered medications for this visit.        Social History     Tobacco Use   • Smoking status: Never Smoker   • Smokeless tobacco: Never Used   Substance Use Topics   • Alcohol use: No   • Drug use: No     Social History     Social History Narrative   • Not on file       History reviewed. No pertinent family history.    Review of Systems:    Constitutional: No Fevers, Chills  Eyes: No vision changes  ENT: No hearing changes  Resp: No Shortness of breath  CV: No Chest pain  GI: No Nausea/Vomiting  MSK: No weakness  Skin: No rashes  Neuro: No Headaches  Psych: No Suicidal ideations    All remaining systems reviewed and found to be negative, except as stated above.    Exam:    /76   Pulse 68   Temp 36.4 °C (97.5 °F) (Temporal)   Resp 14   Ht 1.969 m (6' 5.5\")   Wt 110.7 kg (244 lb)   SpO2 96%  Body mass index is 28.56 kg/m².    General:  Well nourished, well developed male in NAD  HENT: Atraumatic, normocephalic  EYES: Extraocular movements intact  NECK: Supple, FROM  CHEST: No deformities, Equal chest expansion  RESP: Unlabored, no stridor or audible wheeze  HEART: Regular Rate and rhythm.   Extremities: No Clubbing, Cyanosis, or Edema  Skin: Warm/dry, without rashes  Neuro: A/O x 4, due to COVID-19- did not have patient remove face mask to test cranial nerves.  " Motor/sensory grossly intact  Psych: Normal behavior, normal affect      Lab review:  Labs are reviewed and discussed with a patient  Lab Results   Component Value Date/Time    CHOLSTRLTOT 119 12/08/2020 07:41 AM    LDL 45 12/08/2020 07:41 AM    HDL 44 12/08/2020 07:41 AM    TRIGLYCERIDE 152 (H) 12/08/2020 07:41 AM       Lab Results   Component Value Date/Time    SODIUM 133 (L) 12/08/2020 07:39 AM    POTASSIUM 4.6 12/08/2020 07:39 AM    CHLORIDE 106 12/08/2020 07:39 AM    CO2 22 12/08/2020 07:39 AM    GLUCOSE 200 (H) 12/08/2020 07:39 AM    BUN 38 (H) 12/08/2020 07:39 AM    CREATININE 1.58 (H) 12/08/2020 07:39 AM    CREATININE 2.4 (H) 12/18/2006 06:20 AM     Lab Results   Component Value Date/Time    ALKPHOSPHAT 84 12/08/2020 07:39 AM    ASTSGOT 10 (L) 12/08/2020 07:39 AM    ALTSGPT 15 12/08/2020 07:39 AM    TBILIRUBIN 0.4 12/08/2020 07:39 AM      Lab Results   Component Value Date/Time    HBA1C 10.3 (H) 12/08/2020 07:41 AM    HBA1C 10.0 (H) 12/08/2020 07:39 AM    HBA1C 6.4 (H) 04/24/2020 07:24 AM     No results found for: TSH  No results found for: FREET4    Lab Results   Component Value Date/Time    WBC 4.0 (L) 12/08/2020 07:39 AM    RBC 4.30 (L) 12/08/2020 07:39 AM    HEMOGLOBIN 12.0 (L) 12/08/2020 07:39 AM    HEMATOCRIT 39.6 (L) 12/08/2020 07:39 AM    MCV 92.1 12/08/2020 07:39 AM    MCH 27.9 12/08/2020 07:39 AM    MCHC 30.3 (L) 12/08/2020 07:39 AM    MPV 12.0 12/08/2020 07:39 AM    NEUTSPOLYS 71.80 12/08/2020 07:39 AM    LYMPHOCYTES 13.90 (L) 12/08/2020 07:39 AM    MONOCYTES 10.30 12/08/2020 07:39 AM    EOSINOPHILS 3.00 12/08/2020 07:39 AM    BASOPHILS 0.50 12/08/2020 07:39 AM    ANISOCYTOSIS 1+ 09/28/2017 04:09 AM          Assessment/Plan:  1. Gout, unspecified cause, unspecified chronicity, unspecified site  Chronic condition.  Stable.  Uric acid target most recent labs.    2. Anemia of chronic disease  Most likely aggravated by SANKET he had this Covid infection.  Dr. León repeating labs in February.   Followed by nephrology.    3. Dyslipidemia  Chronic condition.  Triglycerides slightly elevated, will make no medication changes at this time continue atorvastatin 80 mg daily.      4. Type 2 diabetes mellitus without complication, without long-term current use of insulin (HCC)  Chronic condition, A1c dramatically increased, most likely secondary to his acute Covid infection and sinus infection.  We will maintain his medications as is currently, Dr. León agrees secondary to infection and has a repeat lab ordered for the patient in February 2021.  If it is not declining nicely discussed alternative medications but need to be cautious with his kidney history.    5. Chronic kidney disease, stage 2, mildly decreased GFR  Follow-up with Dr. Wylie as scheduled.  6. Renal Transplant 9/10/2010 2nd to Polycystic Kidney Disease    7. COVID-19  Resolved  8. Need for vaccination  - Zoster Vac Recomb Adjuvanted (SHINGRIX) 50 MCG/0.5ML Recon Susp; Inject 0.5 mL into the shoulder, thigh, or buttocks one time for 1 dose.  Dispense: 0.5 mL; Refill: 0  - Pneumovax Vaccine (PPSV23)    9. Right hip pain    Other orders  - omeprazole (PRILOSEC) 20 MG Tablet Delayed Response delayed-release tablet; Take 1 Tab by mouth every day.  Dispense: 90 Tab; Refill: 3       Follow-up: Return in about 3 months (around 4/20/2021) for Follow up on labs (ordered from Dr. León).    Please note that this dictation was created using voice recognition software. I have made every reasonable attempt to correct obvious errors, but I expect that there are errors of grammar and possibly content that I did not discover before finalizing the note.

## 2021-01-20 NOTE — ASSESSMENT & PLAN NOTE
Going to see Dr. Luna on Monday, has had 2 1/2 weeks. Staying consistently sore. Aggravated with riding his bike. Sometimes achy down his right leg. No buttocks pain, no lower back pain.  Doesn't hurt as much when he walks.

## 2021-01-20 NOTE — ASSESSMENT & PLAN NOTE
Since his last visit the patient was admitted into the hospital for COVID-19 infection, and decreased kidney function.  On December 1, 2020 he was seen in urgent care and states he was feeling much better he had a mild lingering cough at that time.  He did though complain of sinus congestion facial pain and nasal discharge.  Patient was put on Augmentin

## 2021-01-28 DIAGNOSIS — H26.9 CATARACT OF BOTH EYES, UNSPECIFIED CATARACT TYPE: ICD-10-CM

## 2021-02-10 ENCOUNTER — HOSPITAL ENCOUNTER (OUTPATIENT)
Dept: LAB | Facility: MEDICAL CENTER | Age: 66
End: 2021-02-10
Attending: INTERNAL MEDICINE
Payer: COMMERCIAL

## 2021-02-10 LAB
ALBUMIN SERPL BCP-MCNC: 4.1 G/DL (ref 3.2–4.9)
ALBUMIN/GLOB SERPL: 2 G/DL
ALP SERPL-CCNC: 74 U/L (ref 30–99)
ALT SERPL-CCNC: 15 U/L (ref 2–50)
ANION GAP SERPL CALC-SCNC: 8 MMOL/L (ref 7–16)
APPEARANCE UR: CLEAR
AST SERPL-CCNC: 17 U/L (ref 12–45)
BASOPHILS # BLD AUTO: 0.8 % (ref 0–1.8)
BASOPHILS # BLD: 0.03 K/UL (ref 0–0.12)
BILIRUB SERPL-MCNC: 0.6 MG/DL (ref 0.1–1.5)
BILIRUB UR QL STRIP.AUTO: NEGATIVE
BUN SERPL-MCNC: 32 MG/DL (ref 8–22)
CALCIUM SERPL-MCNC: 9.4 MG/DL (ref 8.5–10.5)
CHLORIDE SERPL-SCNC: 105 MMOL/L (ref 96–112)
CO2 SERPL-SCNC: 25 MMOL/L (ref 20–33)
COLOR UR: YELLOW
CREAT SERPL-MCNC: 1.48 MG/DL (ref 0.5–1.4)
CREAT UR-MCNC: 51.58 MG/DL
EOSINOPHIL # BLD AUTO: 0.09 K/UL (ref 0–0.51)
EOSINOPHIL NFR BLD: 2.4 % (ref 0–6.9)
ERYTHROCYTE [DISTWIDTH] IN BLOOD BY AUTOMATED COUNT: 54.6 FL (ref 35.9–50)
EST. AVERAGE GLUCOSE BLD GHB EST-MCNC: 186 MG/DL
GLOBULIN SER CALC-MCNC: 2.1 G/DL (ref 1.9–3.5)
GLUCOSE SERPL-MCNC: 92 MG/DL (ref 65–99)
GLUCOSE UR STRIP.AUTO-MCNC: NEGATIVE MG/DL
HBA1C MFR BLD: 8.1 % (ref 0–5.6)
HCT VFR BLD AUTO: 42.9 % (ref 42–52)
HGB BLD-MCNC: 12.9 G/DL (ref 14–18)
IMM GRANULOCYTES # BLD AUTO: 0.03 K/UL (ref 0–0.11)
IMM GRANULOCYTES NFR BLD AUTO: 0.8 % (ref 0–0.9)
KETONES UR STRIP.AUTO-MCNC: NEGATIVE MG/DL
LEUKOCYTE ESTERASE UR QL STRIP.AUTO: NEGATIVE
LYMPHOCYTES # BLD AUTO: 0.65 K/UL (ref 1–4.8)
LYMPHOCYTES NFR BLD: 17.1 % (ref 22–41)
MAGNESIUM SERPL-MCNC: 1.7 MG/DL (ref 1.5–2.5)
MCH RBC QN AUTO: 28.5 PG (ref 27–33)
MCHC RBC AUTO-ENTMCNC: 30.1 G/DL (ref 33.7–35.3)
MCV RBC AUTO: 94.9 FL (ref 81.4–97.8)
MICRO URNS: NORMAL
MONOCYTES # BLD AUTO: 0.55 K/UL (ref 0–0.85)
MONOCYTES NFR BLD AUTO: 14.4 % (ref 0–13.4)
NEUTROPHILS # BLD AUTO: 2.46 K/UL (ref 1.82–7.42)
NEUTROPHILS NFR BLD: 64.5 % (ref 44–72)
NITRITE UR QL STRIP.AUTO: NEGATIVE
NRBC # BLD AUTO: 0 K/UL
NRBC BLD-RTO: 0 /100 WBC
PH UR STRIP.AUTO: 6 [PH] (ref 5–8)
PLATELET # BLD AUTO: 102 K/UL (ref 164–446)
PMV BLD AUTO: 12.8 FL (ref 9–12.9)
POTASSIUM SERPL-SCNC: 4.6 MMOL/L (ref 3.6–5.5)
PROT SERPL-MCNC: 6.2 G/DL (ref 6–8.2)
PROT UR QL STRIP: NEGATIVE MG/DL
PROT UR-MCNC: 4 MG/DL (ref 0–15)
RBC # BLD AUTO: 4.52 M/UL (ref 4.7–6.1)
RBC UR QL AUTO: NEGATIVE
SODIUM SERPL-SCNC: 138 MMOL/L (ref 135–145)
SP GR UR STRIP.AUTO: 1.02
URATE SERPL-MCNC: 6.3 MG/DL (ref 2.5–8.3)
UROBILINOGEN UR STRIP.AUTO-MCNC: 0.2 MG/DL
WBC # BLD AUTO: 3.8 K/UL (ref 4.8–10.8)

## 2021-02-10 PROCEDURE — 82043 UR ALBUMIN QUANTITATIVE: CPT

## 2021-02-10 PROCEDURE — 80053 COMPREHEN METABOLIC PANEL: CPT

## 2021-02-10 PROCEDURE — 36415 COLL VENOUS BLD VENIPUNCTURE: CPT

## 2021-02-10 PROCEDURE — 84156 ASSAY OF PROTEIN URINE: CPT

## 2021-02-10 PROCEDURE — 85025 COMPLETE CBC W/AUTO DIFF WBC: CPT

## 2021-02-10 PROCEDURE — 83036 HEMOGLOBIN GLYCOSYLATED A1C: CPT

## 2021-02-10 PROCEDURE — 83735 ASSAY OF MAGNESIUM: CPT

## 2021-02-10 PROCEDURE — 81003 URINALYSIS AUTO W/O SCOPE: CPT

## 2021-02-10 PROCEDURE — 82570 ASSAY OF URINE CREATININE: CPT | Mod: 91

## 2021-02-10 PROCEDURE — 84550 ASSAY OF BLOOD/URIC ACID: CPT

## 2021-02-10 PROCEDURE — 80197 ASSAY OF TACROLIMUS: CPT

## 2021-02-11 LAB
CREAT UR-MCNC: 53.98 MG/DL
MICROALBUMIN UR-MCNC: <1.2 MG/DL
MICROALBUMIN/CREAT UR: NORMAL MG/G (ref 0–30)

## 2021-02-12 LAB — TACROLIMUS BLD-MCNC: 5 NG/ML

## 2021-02-16 NOTE — ASSESSMENT & PLAN NOTE
At his last visit we discussed his hemoglobin A1c went from 6.4-10.0.  Continues to be on Actos 45 mg daily, glyburide 10 mg twice a day and Tradjenta 5 mg daily.Patient was very sick in December, had repeat A1c done by Dr. León February 10, 2021 and came down nicely to 8.1.

## 2021-02-16 NOTE — ASSESSMENT & PLAN NOTE
GFR on labs from December 8, 2020 was at 44 repeat labs February 10, 2021 was at 48.  Previous BUN at 38 and creatinine at 1.5 8 repeat in February BUN at 32 and creatinine of 1.48.  Patient is being followed by Dr. León.

## 2021-02-18 ENCOUNTER — OFFICE VISIT (OUTPATIENT)
Dept: MEDICAL GROUP | Facility: PHYSICIAN GROUP | Age: 66
End: 2021-02-18
Payer: COMMERCIAL

## 2021-02-18 VITALS
BODY MASS INDEX: 28.69 KG/M2 | RESPIRATION RATE: 12 BRPM | OXYGEN SATURATION: 97 % | HEART RATE: 72 BPM | TEMPERATURE: 97.1 F | SYSTOLIC BLOOD PRESSURE: 120 MMHG | DIASTOLIC BLOOD PRESSURE: 62 MMHG | WEIGHT: 248 LBS | HEIGHT: 78 IN

## 2021-02-18 DIAGNOSIS — N17.9 AKI (ACUTE KIDNEY INJURY) (HCC): ICD-10-CM

## 2021-02-18 DIAGNOSIS — Z86.79 HISTORY OF ATRIAL FIBRILLATION: ICD-10-CM

## 2021-02-18 DIAGNOSIS — E78.5 HYPERLIPOPROTEINEMIA: ICD-10-CM

## 2021-02-18 DIAGNOSIS — M25.551 RIGHT HIP PAIN: ICD-10-CM

## 2021-02-18 DIAGNOSIS — N18.2 CHRONIC KIDNEY DISEASE, STAGE 2, MILDLY DECREASED GFR: ICD-10-CM

## 2021-02-18 DIAGNOSIS — D69.6 THROMBOCYTOPENIA (HCC): ICD-10-CM

## 2021-02-18 DIAGNOSIS — D63.8 ANEMIA OF CHRONIC DISEASE: ICD-10-CM

## 2021-02-18 DIAGNOSIS — E11.29 TYPE 2 DIABETES MELLITUS WITH OTHER DIABETIC KIDNEY COMPLICATION, WITHOUT LONG-TERM CURRENT USE OF INSULIN (HCC): ICD-10-CM

## 2021-02-18 PROCEDURE — 99214 OFFICE O/P EST MOD 30 MIN: CPT | Performed by: PHYSICIAN ASSISTANT

## 2021-02-18 RX ORDER — TADALAFIL 5 MG/1
TABLET ORAL
Qty: 15 TABLET | Refills: 1 | Status: SHIPPED | OUTPATIENT
Start: 2021-02-18 | End: 2021-09-09

## 2021-02-18 RX ORDER — OMEPRAZOLE 20 MG/1
CAPSULE, DELAYED RELEASE ORAL
COMMUNITY
Start: 2021-01-20 | End: 2021-02-18

## 2021-02-18 ASSESSMENT — FIBROSIS 4 INDEX: FIB4 SCORE: 2.75

## 2021-02-18 NOTE — ASSESSMENT & PLAN NOTE
No recurrent symptoms. On baby aspirin. Metoprolol SR 25 mg daily. Has not seen cardiology in year. Rate controlled 72. NO CP or SOB, or palpitations.

## 2021-02-18 NOTE — ASSESSMENT & PLAN NOTE
Lab Results   Component Value Date/Time    WBC 3.8 (L) 02/10/2021 07:05 AM    RBC 4.52 (L) 02/10/2021 07:05 AM    HEMOGLOBIN 12.9 (L) 02/10/2021 07:05 AM    HEMATOCRIT 42.9 02/10/2021 07:05 AM    MCV 94.9 02/10/2021 07:05 AM    MCH 28.5 02/10/2021 07:05 AM    MCHC 30.1 (L) 02/10/2021 07:05 AM    RDW 54.6 (H) 02/10/2021 07:05 AM    PLATELETCT 102 (L) 02/10/2021 07:05 AM    MPV 12.8 02/10/2021 07:05 AM    NEUTSPOLYS 64.50 02/10/2021 07:05 AM    LYMPHOCYTES 17.10 (L) 02/10/2021 07:05 AM    MONOCYTES 14.40 (H) 02/10/2021 07:05 AM    EOSINOPHILS 2.40 02/10/2021 07:05 AM    BASOPHILS 0.80 02/10/2021 07:05 AM    IMMGRAN 0.80 02/10/2021 07:05 AM    NRBC 0.00 02/10/2021 07:05 AM    NEUTS 2.46 02/10/2021 07:05 AM    LYMPHS 0.65 (L) 02/10/2021 07:05 AM    LYMPHS 1 09/24/2005 07:45 PM    MONOS 0.55 02/10/2021 07:05 AM    EOS 0.09 02/10/2021 07:05 AM    BASO 0.03 02/10/2021 07:05 AM    IMMGRANAB 0.03 02/10/2021 07:05 AM    NRBCAB 0.00 02/10/2021 07:05 AM   ]  He is on baby aspirin daily. Platelets run low. Will watch closely.

## 2021-02-18 NOTE — ASSESSMENT & PLAN NOTE
Going through PT with specialist. Doing well, he's hopeful will correct his pain and he can restart his routine exercise and bike program.

## 2021-02-18 NOTE — PROGRESS NOTES
CC:   Chief Complaint   Patient presents with   • Lab Results   • Diabetes          HISTORY OF PRESENT ILLNESS: Patient is a 65 y.o. male established patient who presents today to follow up on the following conditions:       Health Maintenance: Completed  Eye exam 02/21- right eye mild DM retinopathy- reviewed with patient today, start of cataract, saw specialist on this matter, does not need surgery at this time.     Type 2 diabetes mellitus with kidney complication, without long-term current use of insulin ([facility])  At his last visit we discussed his hemoglobin A1c went from 6.4-10.0.  Continues to be on Actos 45 mg daily, glyburide 10 mg twice a day and Tradjenta 5 mg daily.Patient was very sick in December, had repeat A1c done by Dr. León February 10, 2021 and came down nicely to 8.1.    Chronic kidney disease, stage 2, mildly decreased GFR  GFR on labs from December 8, 2020 was at 44 repeat labs February 10, 2021 was at 48.  Previous BUN at 38 and creatinine at 1.5 8 repeat in February BUN at 32 and creatinine of 1.48.  Patient is being followed by Dr. León.    Anemia of chronic disease  Anemia essentially stable, most likely secondary to CKD.    Thrombocytopenia (HCC)  Lab Results   Component Value Date/Time    WBC 3.8 (L) 02/10/2021 07:05 AM    RBC 4.52 (L) 02/10/2021 07:05 AM    HEMOGLOBIN 12.9 (L) 02/10/2021 07:05 AM    HEMATOCRIT 42.9 02/10/2021 07:05 AM    MCV 94.9 02/10/2021 07:05 AM    MCH 28.5 02/10/2021 07:05 AM    MCHC 30.1 (L) 02/10/2021 07:05 AM    RDW 54.6 (H) 02/10/2021 07:05 AM    PLATELETCT 102 (L) 02/10/2021 07:05 AM    MPV 12.8 02/10/2021 07:05 AM    NEUTSPOLYS 64.50 02/10/2021 07:05 AM    LYMPHOCYTES 17.10 (L) 02/10/2021 07:05 AM    MONOCYTES 14.40 (H) 02/10/2021 07:05 AM    EOSINOPHILS 2.40 02/10/2021 07:05 AM    BASOPHILS 0.80 02/10/2021 07:05 AM    IMMGRAN 0.80 02/10/2021 07:05 AM    NRBC 0.00 02/10/2021 07:05 AM    NEUTS 2.46 02/10/2021 07:05 AM    LYMPHS 0.65 (L) 02/10/2021 07:05  AM    LYMPHS 1 09/24/2005 07:45 PM    MONOS 0.55 02/10/2021 07:05 AM    EOS 0.09 02/10/2021 07:05 AM    BASO 0.03 02/10/2021 07:05 AM    IMMGRANAB 0.03 02/10/2021 07:05 AM    NRBCAB 0.00 02/10/2021 07:05 AM   ]  He is on baby aspirin daily. Platelets run low. Will watch closely.     History of atrial fibrillation  No recurrent symptoms. On baby aspirin. Metoprolol SR 25 mg daily. Has not seen cardiology in year. Rate controlled 72. NO CP or SOB, or palpitations.     Right hip pain  Going through PT with specialist. Doing well, he's hopeful will correct his pain and he can restart his routine exercise and bike program.     SANKET (acute kidney injury) ([facility])  Resolved, back to baseline after infection. Followed by nephrology.       Patient Active Problem List    Diagnosis Date Noted   • CAD (coronary artery disease) 07/08/2013   • Hypertension 07/09/2018   • Right hip pain 01/20/2021   • COVID-19 11/13/2020   • Thrombocytopenia (HCC) 10/20/2020   • GERD (gastroesophageal reflux disease) 07/09/2018   • Polycystic kidney 07/09/2018   • Gout 07/09/2018   • Anemia of chronic disease 09/28/2017   • Pneumonia 09/27/2017   • Sepsis(995.91) 09/27/2017   • SANKET (acute kidney injury) ([facility]) 09/27/2017   • Weakness 03/04/2014   • Type 2 diabetes mellitus with kidney complication, without long-term current use of insulin ([facility]) 01/09/2014   • S/P insertion of non-drug eluting coronary artery stent 07/08/2013   • History of atrial fibrillation 07/08/2013   • Dyslipidemia    • History of MI (myocardial infarction) 06/26/2013   • Renal Transplant 9/10/2010 2nd to Polycystic Kidney Disease 06/26/2013   • Chronic kidney disease, stage 2, mildly decreased GFR 06/26/2013      Allergies:Doxycycline    Current Outpatient Medications   Medication Sig Dispense Refill   • gabapentin (NEURONTIN) 300 MG Cap TAKE ONE CAPSULE BY MOUTH FOUR TIMES DAILY 360 Cap 1   • predniSONE (DELTASONE) 5 MG Tab Take 1 Tab by mouth every day. Take with food 90 Tab 1  "  • atorvastatin (LIPITOR) 80 MG tablet Take 1 Tab by mouth every bedtime. 100 Tab 1   • metoprolol (LOPRESSOR) 25 MG Tab Take 1 Tab by mouth every day. 90 Tab 1   • glyBURIDE (DIABETA) 5 MG Tab Take 2 Tabs by mouth 2 times a day, with meals. 180 Tab 1   • pioglitazone (ACTOS) 45 MG Tab Take 1 Tab by mouth every day. 90 Tab 1   • cholecalciferol (VITAMIN D3) 5000 UNIT Cap Take 5,000 Units by mouth every 48 hours.     • linagliptin (TRADJENTA) 5 MG TABS tablet Take 1 Tab by mouth every day. 30 Tab 11   • tacrolimus (PROGRAF) 1 MG CAPS Take 1 mg by mouth 2 times a day.       No current facility-administered medications for this visit.       Social History     Tobacco Use   • Smoking status: Never Smoker   • Smokeless tobacco: Never Used   Substance Use Topics   • Alcohol use: No   • Drug use: No     Social History     Social History Narrative   • Not on file       History reviewed. No pertinent family history.    Review of Systems:    Constitutional: No Fevers, Chills  Eyes: No vision changes  ENT: No hearing changes  Resp: No Shortness of breath  CV: No Chest pain  GI: No Nausea/Vomiting  MSK: No weakness  Skin: No rashes  Neuro: No Headaches  Psych: No Suicidal ideations    All remaining systems reviewed and found to be negative, except as stated above.    Exam:    /62   Pulse 72   Temp 36.2 °C (97.1 °F) (Temporal)   Resp 12   Ht 1.969 m (6' 5.5\")   Wt 112 kg (248 lb)   SpO2 97%  Body mass index is 29.03 kg/m².    General:  Well nourished, well developed male in NAD  HENT: Atraumatic, normocephalic  EYES: Extraocular movements intact  NECK: Supple, FROM  CHEST: No deformities, Equal chest expansion  RESP: Unlabored, no stridor or audible wheeze  HEART: Regular Rate and rhythm.   Extremities: No Clubbing, Cyanosis, or Edema  Skin: Warm/dry, without rashes  Neuro: A/O x 4, due to COVID-19- did not have patient remove face mask to test cranial nerves.  Motor/sensory grossly intact  Psych: Normal behavior, " normal affect      Lab review:  Labs are reviewed and discussed with a patient  Lab Results   Component Value Date/Time    CHOLSTRLTOT 119 12/08/2020 07:41 AM    LDL 45 12/08/2020 07:41 AM    HDL 44 12/08/2020 07:41 AM    TRIGLYCERIDE 152 (H) 12/08/2020 07:41 AM       Lab Results   Component Value Date/Time    SODIUM 138 02/10/2021 07:05 AM    POTASSIUM 4.6 02/10/2021 07:05 AM    CHLORIDE 105 02/10/2021 07:05 AM    CO2 25 02/10/2021 07:05 AM    GLUCOSE 92 02/10/2021 07:05 AM    BUN 32 (H) 02/10/2021 07:05 AM    CREATININE 1.48 (H) 02/10/2021 07:05 AM    CREATININE 2.4 (H) 12/18/2006 06:20 AM     Lab Results   Component Value Date/Time    ALKPHOSPHAT 74 02/10/2021 07:05 AM    ASTSGOT 17 02/10/2021 07:05 AM    ALTSGPT 15 02/10/2021 07:05 AM    TBILIRUBIN 0.6 02/10/2021 07:05 AM      Lab Results   Component Value Date/Time    HBA1C 8.1 (H) 02/10/2021 07:05 AM    HBA1C 10.3 (H) 12/08/2020 07:41 AM    HBA1C 10.0 (H) 12/08/2020 07:39 AM     No results found for: TSH  No results found for: FREET4    Lab Results   Component Value Date/Time    WBC 3.8 (L) 02/10/2021 07:05 AM    RBC 4.52 (L) 02/10/2021 07:05 AM    HEMOGLOBIN 12.9 (L) 02/10/2021 07:05 AM    HEMATOCRIT 42.9 02/10/2021 07:05 AM    MCV 94.9 02/10/2021 07:05 AM    MCH 28.5 02/10/2021 07:05 AM    MCHC 30.1 (L) 02/10/2021 07:05 AM    MPV 12.8 02/10/2021 07:05 AM    NEUTSPOLYS 64.50 02/10/2021 07:05 AM    LYMPHOCYTES 17.10 (L) 02/10/2021 07:05 AM    MONOCYTES 14.40 (H) 02/10/2021 07:05 AM    EOSINOPHILS 2.40 02/10/2021 07:05 AM    BASOPHILS 0.80 02/10/2021 07:05 AM    ANISOCYTOSIS 1+ 09/28/2017 04:09 AM          Assessment/Plan:  1. Type 2 diabetes mellitus without complication, without long-term current use of insulin (HCC)  Chronic condition, hemoglobin A1c improved from previous labs.    At his last visit we discussed his hemoglobin A1c went from 6.4-10.0.  Continues to be on Actos 45 mg daily, glyburide 10 mg twice a day and Tradjenta 5 mg daily.Patient was very  sick in December, had repeat A1c done by Dr. León February 10, 2021 and came down nicely to 8.1.    2. Chronic kidney disease, stage 2, mildly decreased GFR  Chronic condition.  Stable.  Followed by Dr. León.    3. Anemia of chronic disease  Chronic condition.  Stable.  Secondary to CKD.  Dr. León has labs to repeat in 3 months.    4. Thrombocytopenia (HCC)  Chronic condition, platelets did drop all a bit compared to previous labs we will watch closely.  Patient is on a baby aspirin prophylactically.  CBC followed by nephrology.    5.  History of atrial fibrillation  History of A. fib 2013, patient states was secondary to acute renal failure, has been stable since, asymptomatic, continues metoprolol SR 25 mg daily, rate controlled at 72.  Continues a baby aspirin.  No chest pain, shortness of breath or palpitations.    6. Dyslipidemia  - Lipid Profile; Future  Chronic condition.  Stable.  Patient continues atorvastatin 80 mg daily, due for updated labs before next routine visit.    7. Right hip pain   Going through PT with specialist. Doing well, he's hopeful will correct his pain and he can restart his routine exercise and bike program.     8. SANKET (acute kidney injury) (HCC)  Resolved, back to baseline, followed by nephrology.    Follow-up: Return in about 3 months (around 5/18/2021) for Follow up on labs.    Please note that this dictation was created using voice recognition software. I have made every reasonable attempt to correct obvious errors, but I expect that there are errors of grammar and possibly content that I did not discover before finalizing the note.

## 2021-02-26 ENCOUNTER — TELEPHONE (OUTPATIENT)
Dept: MEDICAL GROUP | Facility: PHYSICIAN GROUP | Age: 66
End: 2021-02-26

## 2021-02-26 DIAGNOSIS — M79.673 PAIN OF FOOT, UNSPECIFIED LATERALITY: ICD-10-CM

## 2021-02-26 NOTE — TELEPHONE ENCOUNTER
Jama stated that he has been seeing Dr. Meyer for foot problems.    Please Sign or Advise other wise

## 2021-03-15 DIAGNOSIS — Z23 NEED FOR VACCINATION: ICD-10-CM

## 2021-03-31 ENCOUNTER — TELEPHONE (OUTPATIENT)
Dept: MEDICAL GROUP | Facility: PHYSICIAN GROUP | Age: 66
End: 2021-03-31

## 2021-03-31 NOTE — TELEPHONE ENCOUNTER
Received prior auth request for Tradjenta.  Generic alternatives are  Metformin HCL  Metformin HCL ER  Glipizide  Glipizide ER  glimeperide  Glipizide-metformin HCL  Pioglitazone HCL    Please advise if appropriate to change or try for prior auth

## 2021-04-01 RX ORDER — GLYBURIDE 5 MG/1
TABLET ORAL
Qty: 360 TABLET | Refills: 0 | Status: SHIPPED | OUTPATIENT
Start: 2021-04-01 | End: 2021-07-19

## 2021-04-01 NOTE — TELEPHONE ENCOUNTER
DOCUMENTATION OF PAR STATUS:    1. Name of Medication & Dose: Tradjenta 5mg tab     2. Name of Prescription Coverage Company & phone #: [employer]    3. Date Prior Auth Submitted: 04/01/2021    4. What information was given to obtain insurance decision? Cover My Meds    5. Prior Auth Status? Pending    6. Patient Notified: yes

## 2021-04-05 ENCOUNTER — TELEPHONE (OUTPATIENT)
Dept: CASE MANAGEMENT | Facility: MEDICAL CENTER | Age: 66
End: 2021-04-05

## 2021-04-05 NOTE — TELEPHONE ENCOUNTER
Would Januvia (no prior authorization required) be considered to treat this member instead of Tradjenta

## 2021-04-08 NOTE — TELEPHONE ENCOUNTER
FINAL PRIOR AUTHORIZATION STATUS:    1.  Name of Medication & Dose: Tradjenta     2. Prior Auth Status: Approved through 04/07/2022     3. Action Taken: Pharmacy Notified: yes Patient Notified: yes

## 2021-04-27 ENCOUNTER — IMMUNIZATION (OUTPATIENT)
Dept: FAMILY PLANNING/WOMEN'S HEALTH CLINIC | Facility: IMMUNIZATION CENTER | Age: 66
End: 2021-04-27
Attending: INTERNAL MEDICINE
Payer: COMMERCIAL

## 2021-04-27 DIAGNOSIS — Z23 NEED FOR VACCINATION: ICD-10-CM

## 2021-04-27 DIAGNOSIS — Z23 ENCOUNTER FOR VACCINATION: Primary | ICD-10-CM

## 2021-04-27 PROCEDURE — 91300 PFIZER SARS-COV-2 VACCINE: CPT

## 2021-04-27 PROCEDURE — 0001A PFIZER SARS-COV-2 VACCINE: CPT

## 2021-05-10 NOTE — ADDENDUM NOTE
Encounter addended by: Christie Patricia M.D. on: 5/9/2021 5:47 PM   Actions taken: Actions taken from a BestPractice Advisory, Delete clinical note

## 2021-05-18 ENCOUNTER — HOSPITAL ENCOUNTER (OUTPATIENT)
Dept: LAB | Facility: MEDICAL CENTER | Age: 66
End: 2021-05-18
Attending: PHYSICIAN ASSISTANT
Payer: COMMERCIAL

## 2021-05-18 ENCOUNTER — HOSPITAL ENCOUNTER (OUTPATIENT)
Dept: LAB | Facility: MEDICAL CENTER | Age: 66
End: 2021-05-18
Attending: INTERNAL MEDICINE
Payer: COMMERCIAL

## 2021-05-18 DIAGNOSIS — E78.5 HYPERLIPOPROTEINEMIA: ICD-10-CM

## 2021-05-18 LAB
ALBUMIN SERPL BCP-MCNC: 4.2 G/DL (ref 3.2–4.9)
ALBUMIN/GLOB SERPL: 2 G/DL
ALP SERPL-CCNC: 77 U/L (ref 30–99)
ALT SERPL-CCNC: 22 U/L (ref 2–50)
ANION GAP SERPL CALC-SCNC: 9 MMOL/L (ref 7–16)
AST SERPL-CCNC: 21 U/L (ref 12–45)
BASOPHILS # BLD AUTO: 0.8 % (ref 0–1.8)
BASOPHILS # BLD: 0.03 K/UL (ref 0–0.12)
BILIRUB SERPL-MCNC: 0.7 MG/DL (ref 0.1–1.5)
BUN SERPL-MCNC: 40 MG/DL (ref 8–22)
CALCIUM SERPL-MCNC: 9.2 MG/DL (ref 8.5–10.5)
CHLORIDE SERPL-SCNC: 108 MMOL/L (ref 96–112)
CHOLEST SERPL-MCNC: 127 MG/DL (ref 100–199)
CO2 SERPL-SCNC: 24 MMOL/L (ref 20–33)
CREAT SERPL-MCNC: 1.7 MG/DL (ref 0.5–1.4)
CREAT UR-MCNC: 88.66 MG/DL
CREAT UR-MCNC: 91.57 MG/DL
EOSINOPHIL # BLD AUTO: 0.06 K/UL (ref 0–0.51)
EOSINOPHIL NFR BLD: 1.6 % (ref 0–6.9)
ERYTHROCYTE [DISTWIDTH] IN BLOOD BY AUTOMATED COUNT: 50 FL (ref 35.9–50)
EST. AVERAGE GLUCOSE BLD GHB EST-MCNC: 148 MG/DL
FASTING STATUS PATIENT QL REPORTED: NORMAL
GLOBULIN SER CALC-MCNC: 2.1 G/DL (ref 1.9–3.5)
GLUCOSE SERPL-MCNC: 108 MG/DL (ref 65–99)
HBA1C MFR BLD: 6.8 % (ref 4–5.6)
HCT VFR BLD AUTO: 44 % (ref 42–52)
HDLC SERPL-MCNC: 52 MG/DL
HGB BLD-MCNC: 13.7 G/DL (ref 14–18)
IMM GRANULOCYTES # BLD AUTO: 0.02 K/UL (ref 0–0.11)
IMM GRANULOCYTES NFR BLD AUTO: 0.5 % (ref 0–0.9)
LDLC SERPL CALC-MCNC: 47 MG/DL
LYMPHOCYTES # BLD AUTO: 0.69 K/UL (ref 1–4.8)
LYMPHOCYTES NFR BLD: 18.8 % (ref 22–41)
MAGNESIUM SERPL-MCNC: 2 MG/DL (ref 1.5–2.5)
MCH RBC QN AUTO: 29.1 PG (ref 27–33)
MCHC RBC AUTO-ENTMCNC: 31.1 G/DL (ref 33.7–35.3)
MCV RBC AUTO: 93.6 FL (ref 81.4–97.8)
MICROALBUMIN UR-MCNC: <1.2 MG/DL
MICROALBUMIN/CREAT UR: NORMAL MG/G (ref 0–30)
MONOCYTES # BLD AUTO: 0.53 K/UL (ref 0–0.85)
MONOCYTES NFR BLD AUTO: 14.4 % (ref 0–13.4)
NEUTROPHILS # BLD AUTO: 2.34 K/UL (ref 1.82–7.42)
NEUTROPHILS NFR BLD: 63.9 % (ref 44–72)
NRBC # BLD AUTO: 0 K/UL
NRBC BLD-RTO: 0 /100 WBC
PHOSPHATE SERPL-MCNC: 3.5 MG/DL (ref 2.5–4.5)
PLATELET # BLD AUTO: 99 K/UL (ref 164–446)
PMV BLD AUTO: 11.7 FL (ref 9–12.9)
POTASSIUM SERPL-SCNC: 4.7 MMOL/L (ref 3.6–5.5)
PROT SERPL-MCNC: 6.3 G/DL (ref 6–8.2)
PROT UR-MCNC: 7 MG/DL (ref 0–15)
PROT/CREAT UR: 76 MG/G (ref 15–68)
RBC # BLD AUTO: 4.7 M/UL (ref 4.7–6.1)
SODIUM SERPL-SCNC: 141 MMOL/L (ref 135–145)
TRIGL SERPL-MCNC: 142 MG/DL (ref 0–149)
URATE SERPL-MCNC: 7.2 MG/DL (ref 2.5–8.3)
WBC # BLD AUTO: 3.7 K/UL (ref 4.8–10.8)

## 2021-05-18 PROCEDURE — 36415 COLL VENOUS BLD VENIPUNCTURE: CPT

## 2021-05-18 PROCEDURE — 85025 COMPLETE CBC W/AUTO DIFF WBC: CPT

## 2021-05-18 PROCEDURE — 80061 LIPID PANEL: CPT

## 2021-05-18 PROCEDURE — 83036 HEMOGLOBIN GLYCOSYLATED A1C: CPT

## 2021-05-18 PROCEDURE — 82570 ASSAY OF URINE CREATININE: CPT | Mod: 91

## 2021-05-18 PROCEDURE — 84156 ASSAY OF PROTEIN URINE: CPT

## 2021-05-18 PROCEDURE — 83735 ASSAY OF MAGNESIUM: CPT

## 2021-05-18 PROCEDURE — 84100 ASSAY OF PHOSPHORUS: CPT

## 2021-05-18 PROCEDURE — 84550 ASSAY OF BLOOD/URIC ACID: CPT

## 2021-05-18 PROCEDURE — 82043 UR ALBUMIN QUANTITATIVE: CPT

## 2021-05-18 PROCEDURE — 80053 COMPREHEN METABOLIC PANEL: CPT

## 2021-05-18 PROCEDURE — 82306 VITAMIN D 25 HYDROXY: CPT

## 2021-05-18 PROCEDURE — 80197 ASSAY OF TACROLIMUS: CPT

## 2021-05-19 ENCOUNTER — TELEPHONE (OUTPATIENT)
Dept: MEDICAL GROUP | Facility: PHYSICIAN GROUP | Age: 66
End: 2021-05-19

## 2021-05-20 ENCOUNTER — IMMUNIZATION (OUTPATIENT)
Dept: FAMILY PLANNING/WOMEN'S HEALTH CLINIC | Facility: IMMUNIZATION CENTER | Age: 66
End: 2021-05-20
Payer: COMMERCIAL

## 2021-05-20 DIAGNOSIS — Z23 ENCOUNTER FOR VACCINATION: Primary | ICD-10-CM

## 2021-05-20 LAB
25(OH)D3 SERPL-MCNC: 45 NG/ML (ref 30–80)
TACROLIMUS BLD-MCNC: 5.1 NG/ML

## 2021-05-20 PROCEDURE — 91300 PFIZER SARS-COV-2 VACCINE: CPT

## 2021-05-20 PROCEDURE — 0002A PFIZER SARS-COV-2 VACCINE: CPT

## 2021-05-24 PROBLEM — N17.9 AKI (ACUTE KIDNEY INJURY) (HCC): Status: RESOLVED | Noted: 2017-09-27 | Resolved: 2021-05-24

## 2021-05-24 NOTE — ASSESSMENT & PLAN NOTE
Patient followed by nephrology, on immunosuppressant status post kidney transplant.  Patient is on tacrolimus and daily steroids.

## 2021-05-24 NOTE — ASSESSMENT & PLAN NOTE
labs that are already ordered most recent A1c continues to improve at 6.8 on labs from 8/18/2021.  We will make no changes at this time.

## 2021-05-26 ENCOUNTER — OFFICE VISIT (OUTPATIENT)
Dept: MEDICAL GROUP | Facility: PHYSICIAN GROUP | Age: 66
End: 2021-05-26
Payer: COMMERCIAL

## 2021-05-26 VITALS
HEART RATE: 63 BPM | WEIGHT: 243 LBS | TEMPERATURE: 97.5 F | SYSTOLIC BLOOD PRESSURE: 116 MMHG | OXYGEN SATURATION: 97 % | BODY MASS INDEX: 28.11 KG/M2 | DIASTOLIC BLOOD PRESSURE: 64 MMHG | HEIGHT: 78 IN | RESPIRATION RATE: 12 BRPM

## 2021-05-26 DIAGNOSIS — N18.2 CHRONIC KIDNEY DISEASE, STAGE 2, MILDLY DECREASED GFR: ICD-10-CM

## 2021-05-26 DIAGNOSIS — D69.6 THROMBOCYTOPENIA (HCC): ICD-10-CM

## 2021-05-26 DIAGNOSIS — E11.29 TYPE 2 DIABETES MELLITUS WITH OTHER DIABETIC KIDNEY COMPLICATION, WITHOUT LONG-TERM CURRENT USE OF INSULIN (HCC): ICD-10-CM

## 2021-05-26 DIAGNOSIS — M25.50 ARTHRALGIA, UNSPECIFIED JOINT: ICD-10-CM

## 2021-05-26 PROCEDURE — 99214 OFFICE O/P EST MOD 30 MIN: CPT | Performed by: PHYSICIAN ASSISTANT

## 2021-05-26 RX ORDER — OMEPRAZOLE 20 MG/1
CAPSULE, DELAYED RELEASE ORAL
COMMUNITY
Start: 2021-04-22 | End: 2022-01-20

## 2021-05-26 RX ORDER — PIOGLITAZONEHYDROCHLORIDE 45 MG/1
45 TABLET ORAL DAILY
Qty: 90 TABLET | Refills: 1 | Status: SHIPPED | OUTPATIENT
Start: 2021-05-26 | End: 2021-11-29 | Stop reason: SDUPTHER

## 2021-05-26 RX ORDER — ATORVASTATIN CALCIUM 80 MG/1
80 TABLET, FILM COATED ORAL
Qty: 90 TABLET | Refills: 1 | Status: SHIPPED | OUTPATIENT
Start: 2021-05-26 | End: 2022-02-24 | Stop reason: SDUPTHER

## 2021-05-26 RX ORDER — MYCOPHENOLATE MOFETIL 250 MG/1
500 CAPSULE ORAL 2 TIMES DAILY
Status: ON HOLD | COMMUNITY
Start: 2021-03-05 | End: 2022-06-22

## 2021-05-26 ASSESSMENT — FIBROSIS 4 INDEX: FIB4 SCORE: 2.89

## 2021-05-26 NOTE — PROGRESS NOTES
CC:   Chief Complaint   Patient presents with   • Lab Results   • Dyslipidemia          HISTORY OF PRESENT ILLNESS: Patient is a 65 y.o. male established patient who presents today to follow up on the following conditions:       Health Maintenance: Completed    Type 2 diabetes mellitus with kidney complication, without long-term current use of insulin ([facility])   labs that are already ordered most recent A1c continues to improve at 6.8 on labs from 8/18/2021.  We will make no changes at this time.    Chronic kidney disease, stage 2, mildly decreased GFR  Most recent GFR is at 41 with a BUN at 40 and creatinine at 1.7.  Followed by Dr. León    Thrombocytopenia ([facility])      Patient followed by nephrology, on immunosuppressant status post kidney transplant.  Patient is on tacrolimus and daily steroids.      Arthralgia  Last couple weeks back and hips have been more painful. He did see Dr. uLna for his hips. He would recommend injections if needed. He tried PT for 3 months- but kind of helped, but didn't make it go away. But knee became flared up with PT.       Patient Active Problem List    Diagnosis Date Noted   • Arthralgia 05/26/2021   • Right hip pain 01/20/2021   • COVID-19 11/13/2020   • Thrombocytopenia ([facility]) 10/20/2020   • Hypertension 07/09/2018   • GERD (gastroesophageal reflux disease) 07/09/2018   • Polycystic kidney 07/09/2018   • Gout 07/09/2018   • Anemia of chronic disease 09/28/2017   • Pneumonia 09/27/2017   • Sepsis(995.91) 09/27/2017   • Weakness 03/04/2014   • Type 2 diabetes mellitus with kidney complication, without long-term current use of insulin ([facility]) 01/09/2014   • S/P insertion of non-drug eluting coronary artery stent 07/08/2013   • History of atrial fibrillation 07/08/2013   • CAD (coronary artery disease) 07/08/2013   • Dyslipidemia    • History of MI (myocardial infarction) 06/26/2013   • Renal Transplant 9/10/2010 2nd to Polycystic Kidney Disease 06/26/2013   • Chronic kidney disease,  stage 2, mildly decreased GFR 06/26/2013      Allergies:Doxycycline    Current Outpatient Medications   Medication Sig Dispense Refill   • mycophenolate (CELLCEPT) 250 MG Cap      • omeprazole (PRILOSEC) 20 MG delayed-release capsule      • atorvastatin (LIPITOR) 80 MG tablet Take 1 tablet by mouth at bedtime. 90 tablet 1   • metoprolol tartrate (LOPRESSOR) 25 MG Tab Take 1 tablet by mouth every day. 90 tablet 1   • pioglitazone (ACTOS) 45 MG Tab Take 1 tablet by mouth every day. 90 tablet 1   • glyBURIDE (DIABETA) 5 MG Tab TAKE TWO TABLETS BY MOUTH TWICE DAILY WITH MEALS  360 tablet 0   • linagliptin (TRADJENTA) 5 MG Tab tablet Take 1 tablet by mouth every day. 90 tablet 3   • tadalafil (CIALIS) 5 MG tablet Take 1 tablets 30 minutes before sexual activity, do not exceed more than one dose a day 15 tablet 1   • gabapentin (NEURONTIN) 300 MG Cap TAKE ONE CAPSULE BY MOUTH FOUR TIMES DAILY 360 Cap 1   • predniSONE (DELTASONE) 5 MG Tab Take 1 Tab by mouth every day. Take with food 90 Tab 1   • cholecalciferol (VITAMIN D3) 5000 UNIT Cap Take 5,000 Units by mouth every 48 hours.     • linagliptin (TRADJENTA) 5 MG TABS tablet Take 1 Tab by mouth every day. 30 Tab 11   • tacrolimus (PROGRAF) 1 MG CAPS Take 1 mg by mouth 2 times a day.       No current facility-administered medications for this visit.       Social History     Tobacco Use   • Smoking status: Never Smoker   • Smokeless tobacco: Never Used   Vaping Use   • Vaping Use: Never used   Substance Use Topics   • Alcohol use: No   • Drug use: No     Social History     Social History Narrative   • Not on file       History reviewed. No pertinent family history.    Review of Systems:    Constitutional: No Fevers, Chills  Eyes: No vision changes  ENT: No hearing changes  Resp: No Shortness of breath  CV: No Chest pain  GI: No Nausea/Vomiting  MSK: No weakness  Skin: No rashes  Neuro: No Headaches  Psych: No Suicidal ideations    All remaining systems reviewed and found to  "be negative, except as stated above.    Exam:    /64   Pulse 63   Temp 36.4 °C (97.5 °F) (Temporal)   Resp 12   Ht 1.969 m (6' 5.5\")   Wt 110 kg (243 lb)   SpO2 97%  Body mass index is 28.44 kg/m².    General:  Well nourished, well developed male in NAD  HENT: Atraumatic, normocephalic  EYES: Extraocular movements intact  NECK: Supple, FROM  CHEST: No deformities, Equal chest expansion  RESP: Unlabored, no stridor or audible wheeze  HEART: Regular Rate and rhythm.   Extremities: No Clubbing, Cyanosis, or Edema  Skin: Warm/dry, without rashes  Neuro: A/O x 4, due to COVID-19- did not have patient remove face mask to test cranial nerves.  Motor/sensory grossly intact  Psych: Normal behavior, normal affect      Lab review:  Labs are reviewed and discussed with a patient  Lab Results   Component Value Date/Time    CHOLSTRLTOT 127 05/18/2021 07:26 AM    LDL 47 05/18/2021 07:26 AM    HDL 52 05/18/2021 07:26 AM    TRIGLYCERIDE 142 05/18/2021 07:26 AM       Lab Results   Component Value Date/Time    SODIUM 141 05/18/2021 07:20 AM    POTASSIUM 4.7 05/18/2021 07:20 AM    CHLORIDE 108 05/18/2021 07:20 AM    CO2 24 05/18/2021 07:20 AM    GLUCOSE 108 (H) 05/18/2021 07:20 AM    BUN 40 (H) 05/18/2021 07:20 AM    CREATININE 1.70 (H) 05/18/2021 07:20 AM    CREATININE 2.4 (H) 12/18/2006 06:20 AM     Lab Results   Component Value Date/Time    ALKPHOSPHAT 77 05/18/2021 07:20 AM    ASTSGOT 21 05/18/2021 07:20 AM    ALTSGPT 22 05/18/2021 07:20 AM    TBILIRUBIN 0.7 05/18/2021 07:20 AM      Lab Results   Component Value Date/Time    HBA1C 6.8 (H) 05/18/2021 07:20 AM    HBA1C 8.1 (H) 02/10/2021 07:05 AM    HBA1C 10.3 (H) 12/08/2020 07:41 AM     No results found for: TSH  No results found for: FREET4    Lab Results   Component Value Date/Time    WBC 3.7 (L) 05/18/2021 07:20 AM    RBC 4.70 05/18/2021 07:20 AM    HEMOGLOBIN 13.7 (L) 05/18/2021 07:20 AM    HEMATOCRIT 44.0 05/18/2021 07:20 AM    MCV 93.6 05/18/2021 07:20 AM    MCH " 29.1 05/18/2021 07:20 AM    MCHC 31.1 (L) 05/18/2021 07:20 AM    MPV 11.7 05/18/2021 07:20 AM    NEUTSPOLYS 63.90 05/18/2021 07:20 AM    LYMPHOCYTES 18.80 (L) 05/18/2021 07:20 AM    MONOCYTES 14.40 (H) 05/18/2021 07:20 AM    EOSINOPHILS 1.60 05/18/2021 07:20 AM    BASOPHILS 0.80 05/18/2021 07:20 AM    ANISOCYTOSIS 1+ 09/28/2017 04:09 AM          Assessment/Plan:  1. Type 2 diabetes mellitus with other diabetic kidney complication, without long-term current use of insulin ([facility])  A1c at goal.  We will make no modifications to his medications at this time.  Patient continues Actos 45 mg daily, glyburide 10 mg twice a day and Tradjenta 5 mg daily.  2. Chronic kidney disease, stage 2, mildly decreased GFR  Follow-up with Dr. León next week as scheduled.  3. Thrombocytopenia (HCC)  Patient will follow up with Dr. Dumas next week.  It appears as though his platelets have been in the 90s per past CBCs, however white blood cell count is dropping as well.  Patient will inquire Dr. León about his current medications as immunosuppressants.  4. Arthralgia, unspecified joint  Back and hips affecting him mostly.  Patient recently saw Dr. Luna.  If needed they discussed injections.  Patient holding off right now.  Taking Tylenol as needed for pain.  Other orders  - mycophenolate (CELLCEPT) 250 MG Cap  - omeprazole (PRILOSEC) 20 MG delayed-release capsule  - atorvastatin (LIPITOR) 80 MG tablet; Take 1 tablet by mouth at bedtime.  Dispense: 90 tablet; Refill: 1  - metoprolol tartrate (LOPRESSOR) 25 MG Tab; Take 1 tablet by mouth every day.  Dispense: 90 tablet; Refill: 1  - pioglitazone (ACTOS) 45 MG Tab; Take 1 tablet by mouth every day.  Dispense: 90 tablet; Refill: 1       Follow-up: Return in about 3 months (around 8/26/2021) for Follow up on labs.    Please note that this dictation was created using voice recognition software. I have made every reasonable attempt to correct obvious errors, but I expect that there are  errors of grammar and possibly content that I did not discover before finalizing the note.

## 2021-05-26 NOTE — ASSESSMENT & PLAN NOTE
Last couple weeks back and hips have been more painful. He did see Dr. Luna for his hips. He would recommend injections if needed. He tried PT for 3 months- but kind of helped, but didn't make it go away. But knee became flared up with PT.

## 2021-06-16 ENCOUNTER — TELEPHONE (OUTPATIENT)
Dept: MEDICAL GROUP | Facility: PHYSICIAN GROUP | Age: 66
End: 2021-06-16

## 2021-06-16 DIAGNOSIS — Z94.0 HISTORY OF RENAL TRANSPLANT: ICD-10-CM

## 2021-06-16 DIAGNOSIS — N18.2 CHRONIC KIDNEY DISEASE, STAGE 2, MILDLY DECREASED GFR: ICD-10-CM

## 2021-06-16 NOTE — TELEPHONE ENCOUNTER
[facility] called and Jama is being seen by them and a referral is need.    Please Advise

## 2021-07-06 RX ORDER — PREDNISONE 5 MG/1
5 TABLET ORAL DAILY
Qty: 90 TABLET | Refills: 1 | Status: SHIPPED | OUTPATIENT
Start: 2021-07-06 | End: 2021-12-27 | Stop reason: SDUPTHER

## 2021-07-06 RX ORDER — PREDNISONE 5 MG/1
5 TABLET ORAL DAILY
Qty: 90 TABLET | Refills: 1 | OUTPATIENT
Start: 2021-07-06

## 2021-07-19 RX ORDER — GLYBURIDE 5 MG/1
TABLET ORAL
Qty: 360 TABLET | Refills: 1 | Status: SHIPPED | OUTPATIENT
Start: 2021-07-19 | End: 2022-01-13 | Stop reason: SDUPTHER

## 2021-07-19 RX ORDER — GABAPENTIN 300 MG/1
CAPSULE ORAL
Qty: 360 CAPSULE | Refills: 1 | Status: SHIPPED | OUTPATIENT
Start: 2021-07-19 | End: 2022-01-07 | Stop reason: SDUPTHER

## 2021-07-19 NOTE — TELEPHONE ENCOUNTER
Patient has recently been seen by PCP within the last 6 months per protocol (5/21). Will refill medications for 6 months. Monitoring renal function but WNL.   Lab Results   Component Value Date/Time    HBA1C 6.8 (H) 05/18/2021 07:20 AM      Lab Results   Component Value Date/Time    MICROALBCALC 5.7 08/06/2013 12:00 AM    MALBCRT see below 05/18/2021 07:20 AM    MICROALBUR <1.2 05/18/2021 07:20 AM      Lab Results   Component Value Date/Time    ALKPHOSPHAT 77 05/18/2021 07:20 AM    ASTSGOT 21 05/18/2021 07:20 AM    ALTSGPT 22 05/18/2021 07:20 AM    TBILIRUBIN 0.7 05/18/2021 07:20 AM

## 2021-08-19 ENCOUNTER — HOSPITAL ENCOUNTER (OUTPATIENT)
Dept: LAB | Facility: MEDICAL CENTER | Age: 66
End: 2021-08-19
Attending: INTERNAL MEDICINE
Payer: COMMERCIAL

## 2021-08-19 LAB
25(OH)D3 SERPL-MCNC: 38 NG/ML (ref 30–100)
ALBUMIN SERPL BCP-MCNC: 4.4 G/DL (ref 3.2–4.9)
ALBUMIN/GLOB SERPL: 1.9 G/DL
ALP SERPL-CCNC: 68 U/L (ref 30–99)
ALT SERPL-CCNC: 16 U/L (ref 2–50)
ANION GAP SERPL CALC-SCNC: 14 MMOL/L (ref 7–16)
APPEARANCE UR: CLEAR
AST SERPL-CCNC: 18 U/L (ref 12–45)
BASOPHILS # BLD AUTO: 0.6 % (ref 0–1.8)
BASOPHILS # BLD: 0.03 K/UL (ref 0–0.12)
BILIRUB SERPL-MCNC: 0.5 MG/DL (ref 0.1–1.5)
BILIRUB UR QL STRIP.AUTO: NEGATIVE
BUN SERPL-MCNC: 48 MG/DL (ref 8–22)
CALCIUM SERPL-MCNC: 9.5 MG/DL (ref 8.5–10.5)
CHLORIDE SERPL-SCNC: 106 MMOL/L (ref 96–112)
CO2 SERPL-SCNC: 22 MMOL/L (ref 20–33)
COLOR UR: YELLOW
CREAT SERPL-MCNC: 1.89 MG/DL (ref 0.5–1.4)
CREAT UR-MCNC: 66.78 MG/DL
CREAT UR-MCNC: 67.53 MG/DL
EOSINOPHIL # BLD AUTO: 0.08 K/UL (ref 0–0.51)
EOSINOPHIL NFR BLD: 1.6 % (ref 0–6.9)
ERYTHROCYTE [DISTWIDTH] IN BLOOD BY AUTOMATED COUNT: 49.5 FL (ref 35.9–50)
EST. AVERAGE GLUCOSE BLD GHB EST-MCNC: 134 MG/DL
FASTING STATUS PATIENT QL REPORTED: NORMAL
GLOBULIN SER CALC-MCNC: 2.3 G/DL (ref 1.9–3.5)
GLUCOSE SERPL-MCNC: 74 MG/DL (ref 65–99)
GLUCOSE UR STRIP.AUTO-MCNC: NEGATIVE MG/DL
HBA1C MFR BLD: 6.3 % (ref 4–5.6)
HCT VFR BLD AUTO: 45.9 % (ref 42–52)
HGB BLD-MCNC: 14.2 G/DL (ref 14–18)
IMM GRANULOCYTES # BLD AUTO: 0.04 K/UL (ref 0–0.11)
IMM GRANULOCYTES NFR BLD AUTO: 0.8 % (ref 0–0.9)
KETONES UR STRIP.AUTO-MCNC: NEGATIVE MG/DL
LEUKOCYTE ESTERASE UR QL STRIP.AUTO: NEGATIVE
LYMPHOCYTES # BLD AUTO: 0.95 K/UL (ref 1–4.8)
LYMPHOCYTES NFR BLD: 18.8 % (ref 22–41)
MAGNESIUM SERPL-MCNC: 2 MG/DL (ref 1.5–2.5)
MCH RBC QN AUTO: 29.4 PG (ref 27–33)
MCHC RBC AUTO-ENTMCNC: 30.9 G/DL (ref 33.7–35.3)
MCV RBC AUTO: 95 FL (ref 81.4–97.8)
MICRO URNS: NORMAL
MICROALBUMIN UR-MCNC: <1.2 MG/DL
MICROALBUMIN/CREAT UR: NORMAL MG/G (ref 0–30)
MONOCYTES # BLD AUTO: 0.75 K/UL (ref 0–0.85)
MONOCYTES NFR BLD AUTO: 14.9 % (ref 0–13.4)
NEUTROPHILS # BLD AUTO: 3.19 K/UL (ref 1.82–7.42)
NEUTROPHILS NFR BLD: 63.3 % (ref 44–72)
NITRITE UR QL STRIP.AUTO: NEGATIVE
NRBC # BLD AUTO: 0 K/UL
NRBC BLD-RTO: 0 /100 WBC
PH UR STRIP.AUTO: 6 [PH] (ref 5–8)
PHOSPHATE SERPL-MCNC: 3.2 MG/DL (ref 2.5–4.5)
PLATELET # BLD AUTO: 106 K/UL (ref 164–446)
PMV BLD AUTO: 11.6 FL (ref 9–12.9)
POTASSIUM SERPL-SCNC: 4.7 MMOL/L (ref 3.6–5.5)
PROT SERPL-MCNC: 6.7 G/DL (ref 6–8.2)
PROT UR QL STRIP: NEGATIVE MG/DL
PROT UR-MCNC: 5 MG/DL (ref 0–15)
PROT/CREAT UR: 74 MG/G (ref 15–68)
RBC # BLD AUTO: 4.83 M/UL (ref 4.7–6.1)
RBC UR QL AUTO: NEGATIVE
SODIUM SERPL-SCNC: 142 MMOL/L (ref 135–145)
SP GR UR STRIP.AUTO: 1.02
URATE SERPL-MCNC: 7.7 MG/DL (ref 2.5–8.3)
UROBILINOGEN UR STRIP.AUTO-MCNC: 0.2 MG/DL
WBC # BLD AUTO: 5 K/UL (ref 4.8–10.8)

## 2021-08-19 PROCEDURE — 83036 HEMOGLOBIN GLYCOSYLATED A1C: CPT

## 2021-08-19 PROCEDURE — 82570 ASSAY OF URINE CREATININE: CPT | Mod: 91

## 2021-08-19 PROCEDURE — 84550 ASSAY OF BLOOD/URIC ACID: CPT

## 2021-08-19 PROCEDURE — 83735 ASSAY OF MAGNESIUM: CPT

## 2021-08-19 PROCEDURE — 82043 UR ALBUMIN QUANTITATIVE: CPT

## 2021-08-19 PROCEDURE — 82306 VITAMIN D 25 HYDROXY: CPT

## 2021-08-19 PROCEDURE — 36415 COLL VENOUS BLD VENIPUNCTURE: CPT

## 2021-08-19 PROCEDURE — 80197 ASSAY OF TACROLIMUS: CPT

## 2021-08-19 PROCEDURE — 85025 COMPLETE CBC W/AUTO DIFF WBC: CPT

## 2021-08-19 PROCEDURE — 84100 ASSAY OF PHOSPHORUS: CPT

## 2021-08-19 PROCEDURE — 80053 COMPREHEN METABOLIC PANEL: CPT

## 2021-08-19 PROCEDURE — 84156 ASSAY OF PROTEIN URINE: CPT

## 2021-08-19 PROCEDURE — 81003 URINALYSIS AUTO W/O SCOPE: CPT

## 2021-08-21 LAB — TACROLIMUS BLD-MCNC: 4.9 NG/ML

## 2021-08-26 ENCOUNTER — OFFICE VISIT (OUTPATIENT)
Dept: MEDICAL GROUP | Facility: PHYSICIAN GROUP | Age: 66
End: 2021-08-26
Payer: COMMERCIAL

## 2021-08-26 VITALS
HEIGHT: 77 IN | SYSTOLIC BLOOD PRESSURE: 112 MMHG | BODY MASS INDEX: 29.64 KG/M2 | TEMPERATURE: 97.5 F | HEART RATE: 70 BPM | WEIGHT: 251 LBS | RESPIRATION RATE: 16 BRPM | OXYGEN SATURATION: 98 % | DIASTOLIC BLOOD PRESSURE: 80 MMHG

## 2021-08-26 DIAGNOSIS — N18.32 STAGE 3B CHRONIC KIDNEY DISEASE: ICD-10-CM

## 2021-08-26 DIAGNOSIS — I10 ESSENTIAL HYPERTENSION: ICD-10-CM

## 2021-08-26 DIAGNOSIS — E78.5 HYPERLIPOPROTEINEMIA: Chronic | ICD-10-CM

## 2021-08-26 DIAGNOSIS — E11.29 TYPE 2 DIABETES MELLITUS WITH OTHER DIABETIC KIDNEY COMPLICATION, WITHOUT LONG-TERM CURRENT USE OF INSULIN (HCC): ICD-10-CM

## 2021-08-26 DIAGNOSIS — D63.8 ANEMIA OF CHRONIC DISEASE: ICD-10-CM

## 2021-08-26 PROCEDURE — 99214 OFFICE O/P EST MOD 30 MIN: CPT | Performed by: PHYSICIAN ASSISTANT

## 2021-08-26 ASSESSMENT — FIBROSIS 4 INDEX: FIB4 SCORE: 2.72

## 2021-08-26 NOTE — PROGRESS NOTES
CC:   Chief Complaint   Patient presents with   • Lab Results     Follow up   • Diabetes   • Hyperlipidemia   • Hypertension          HISTORY OF PRESENT ILLNESS: Patient is a 65 y.o. male established patient who presents today to follow up on the following conditions:       Health Maintenance: Completed      Anemia of chronic disease  Lab Results   Component Value Date/Time    WBC 5.0 08/19/2021 06:49 AM    RBC 4.83 08/19/2021 06:49 AM    HEMOGLOBIN 14.2 08/19/2021 06:49 AM    HEMATOCRIT 45.9 08/19/2021 06:49 AM    MCV 95.0 08/19/2021 06:49 AM    MCH 29.4 08/19/2021 06:49 AM    MCHC 30.9 (L) 08/19/2021 06:49 AM    RDW 49.5 08/19/2021 06:49 AM    PLATELETCT 106 (L) 08/19/2021 06:49 AM    MPV 11.6 08/19/2021 06:49 AM    NEUTSPOLYS 63.30 08/19/2021 06:49 AM    LYMPHOCYTES 18.80 (L) 08/19/2021 06:49 AM    MONOCYTES 14.90 (H) 08/19/2021 06:49 AM    EOSINOPHILS 1.60 08/19/2021 06:49 AM    BASOPHILS 0.60 08/19/2021 06:49 AM    IMMGRAN 0.80 08/19/2021 06:49 AM    NRBC 0.00 08/19/2021 06:49 AM    NEUTS 3.19 08/19/2021 06:49 AM    LYMPHS 0.95 (L) 08/19/2021 06:49 AM    LYMPHS 1 09/24/2005 07:45 PM    MONOS 0.75 08/19/2021 06:49 AM    EOS 0.08 08/19/2021 06:49 AM    BASO 0.03 08/19/2021 06:49 AM    IMMGRANAB 0.04 08/19/2021 06:49 AM    NRBCAB 0.00 08/19/2021 06:49 AM   ]  Stable overall.     Stage 3b chronic kidney disease (HCC)  Patient continues to follow-up with Dr. León.  Most recent labs show BUN of 48, creatinine 1.89 and GFR of 36.  BUN and creatinine slightly elevated compared to previous.  Overall stable.    Type 2 diabetes mellitus with kidney complication, without long-term current use of insulin (HCC)  This is a chronic condition.  Current medications: Glyburide 5 mg 2 tabs twice a day, Tradjenta 5 mg daily, Actos 45 mg daily.  Last A1c:   Lab Results   Component Value Date/Time    HBA1C 6.3 (H) 08/19/2021 0649    AVGLUC 134 08/19/2021 0649     Last Microalb/Cr ratio:   Lab Results   Component Value Date/Time     URCREAT 66.78 08/19/2021 0649    MICROALBUR <1.2 08/19/2021 0649    MALBCRT see below 08/19/2021 0649       Dyslipidemia  This is a chronic condition.   Latest Labs:   Lab Results   Component Value Date/Time    CHOLSTRLTOT 127 05/18/2021 07:26 AM    LDL 47 05/18/2021 07:26 AM    HDL 52 05/18/2021 07:26 AM    TRIGLYCERIDE 142 05/18/2021 07:26 AM      Medications: Atorvastatin 80 mg daily.    Hypertension  This is a chronic condition.  Current Meds: metoprolol 25 mg daily  Associated symptoms:  denies any chest pain, sob, headaches, dizziness/lightheadedness        Patient Active Problem List    Diagnosis Date Noted   • Arthralgia 05/26/2021   • Right hip pain 01/20/2021   • COVID-19 11/13/2020   • Thrombocytopenia ([facility]) 10/20/2020   • Hypertension 07/09/2018   • GERD (gastroesophageal reflux disease) 07/09/2018   • Polycystic kidney 07/09/2018   • Gout 07/09/2018   • Anemia of chronic disease 09/28/2017   • Pneumonia 09/27/2017   • Sepsis(995.91) 09/27/2017   • Weakness 03/04/2014   • Type 2 diabetes mellitus with kidney complication, without long-term current use of insulin ([facility]) 01/09/2014   • S/P insertion of non-drug eluting coronary artery stent 07/08/2013   • History of atrial fibrillation 07/08/2013   • CAD (coronary artery disease) 07/08/2013   • Dyslipidemia    • History of MI (myocardial infarction) 06/26/2013   • Renal Transplant 9/10/2010 2nd to Polycystic Kidney Disease 06/26/2013   • Stage 3b chronic kidney disease (HCC) 06/26/2013      Allergies:Doxycycline    Current Outpatient Medications   Medication Sig Dispense Refill   • glyBURIDE (DIABETA) 5 MG Tab TAKE TWO TABLETS BY MOUTH TWICE DAILY WITH MEALS  360 tablet 1   • gabapentin (NEURONTIN) 300 MG Cap TAKE ONE CAPSULE BY MOUTH FOUR TIMES DAILY  360 capsule 1   • predniSONE (DELTASONE) 5 MG Tab Take 1 tablet by mouth every day. Take with food 90 tablet 1   • mycophenolate (CELLCEPT) 250 MG Cap      • omeprazole (PRILOSEC) 20 MG delayed-release  "capsule      • atorvastatin (LIPITOR) 80 MG tablet Take 1 tablet by mouth at bedtime. 90 tablet 1   • metoprolol tartrate (LOPRESSOR) 25 MG Tab Take 1 tablet by mouth every day. 90 tablet 1   • pioglitazone (ACTOS) 45 MG Tab Take 1 tablet by mouth every day. 90 tablet 1   • linagliptin (TRADJENTA) 5 MG Tab tablet Take 1 tablet by mouth every day. 90 tablet 3   • tadalafil (CIALIS) 5 MG tablet Take 1 tablets 30 minutes before sexual activity, do not exceed more than one dose a day 15 tablet 1   • cholecalciferol (VITAMIN D3) 5000 UNIT Cap Take 5,000 Units by mouth every 48 hours.     • tacrolimus (PROGRAF) 1 MG CAPS Take 1 mg by mouth 2 times a day.       No current facility-administered medications for this visit.       Social History     Tobacco Use   • Smoking status: Never Smoker   • Smokeless tobacco: Never Used   Vaping Use   • Vaping Use: Never used   Substance Use Topics   • Alcohol use: No   • Drug use: No     Social History     Social History Narrative   • Not on file       History reviewed. No pertinent family history.    Review of Systems:    Constitutional: No Fevers, Chills  Eyes: No vision changes  ENT: No hearing changes  Resp: No Shortness of breath  CV: No Chest pain  GI: No Nausea/Vomiting  MSK: No weakness  Skin: No rashes  Neuro: No Headaches  Psych: No Suicidal ideations    All remaining systems reviewed and found to be negative, except as stated above.    Exam:    /80 (BP Location: Left arm, Patient Position: Sitting, BP Cuff Size: Adult)   Pulse 70   Temp 36.4 °C (97.5 °F) (Temporal)   Resp 16   Ht 1.956 m (6' 5\")   Wt 114 kg (251 lb)   SpO2 98%  Body mass index is 29.76 kg/m².    General:  Well nourished, well developed male in NAD  HENT: Atraumatic, normocephalic  EYES: Extraocular movements intact  NECK: Supple, FROM  CHEST: No deformities, Equal chest expansion  RESP: Unlabored, no stridor or audible wheeze  HEART: Regular Rate and rhythm.   Extremities: No Clubbing, Cyanosis, " or Edema  Skin: Warm/dry, without rashes  Neuro: A/O x 4, due to COVID-19- did not have patient remove face mask to test cranial nerves.  Motor/sensory grossly intact  Psych: Normal behavior, normal affect      Lab review:  Labs are reviewed and discussed with a patient  Lab Results   Component Value Date/Time    CHOLSTRLTOT 127 05/18/2021 07:26 AM    LDL 47 05/18/2021 07:26 AM    HDL 52 05/18/2021 07:26 AM    TRIGLYCERIDE 142 05/18/2021 07:26 AM       Lab Results   Component Value Date/Time    SODIUM 142 08/19/2021 06:49 AM    POTASSIUM 4.7 08/19/2021 06:49 AM    CHLORIDE 106 08/19/2021 06:49 AM    CO2 22 08/19/2021 06:49 AM    GLUCOSE 74 08/19/2021 06:49 AM    BUN 48 (H) 08/19/2021 06:49 AM    CREATININE 1.89 (H) 08/19/2021 06:49 AM    CREATININE 2.4 (H) 12/18/2006 06:20 AM     Lab Results   Component Value Date/Time    ALKPHOSPHAT 68 08/19/2021 06:49 AM    ASTSGOT 18 08/19/2021 06:49 AM    ALTSGPT 16 08/19/2021 06:49 AM    TBILIRUBIN 0.5 08/19/2021 06:49 AM      Lab Results   Component Value Date/Time    HBA1C 6.3 (H) 08/19/2021 06:49 AM    HBA1C 6.8 (H) 05/18/2021 07:20 AM    HBA1C 6.8 (A) 05/18/2021 07:20 AM    HBA1C 8.1 (H) 02/10/2021 07:05 AM     No results found for: TSH  No results found for: FREET4    Lab Results   Component Value Date/Time    WBC 5.0 08/19/2021 06:49 AM    RBC 4.83 08/19/2021 06:49 AM    HEMOGLOBIN 14.2 08/19/2021 06:49 AM    HEMATOCRIT 45.9 08/19/2021 06:49 AM    MCV 95.0 08/19/2021 06:49 AM    MCH 29.4 08/19/2021 06:49 AM    MCHC 30.9 (L) 08/19/2021 06:49 AM    MPV 11.6 08/19/2021 06:49 AM    NEUTSPOLYS 63.30 08/19/2021 06:49 AM    LYMPHOCYTES 18.80 (L) 08/19/2021 06:49 AM    MONOCYTES 14.90 (H) 08/19/2021 06:49 AM    EOSINOPHILS 1.60 08/19/2021 06:49 AM    BASOPHILS 0.60 08/19/2021 06:49 AM    ANISOCYTOSIS 1+ 09/28/2017 04:09 AM          Assessment/Plan:  1. Anemia of chronic disease  Chronic addition.  Stable.  Followed by Dr. León.  2. Stage 3b chronic kidney disease  (HCC)  Chronic condition.  Stable.  Followed by Dr. León.  3. Type 2 diabetes mellitus with other diabetic kidney complication, without long-term current use of insulin (HCC)  Chronic condition.  Stable.  Patient continues glyburide 5 mg 2 tabs twice a day, pioglitazone 45 mg daily and Tradjenta 5 mg daily.  4. Dyslipidemia  Chronic condition.  Stable.  Patient continues atorvastatin 80 mg daily.  5. Essential hypertension  Chronic condition.  Stable.  Patient continues metoprolol 25 mg daily.    Follow-up: Return in about 6 months (around 2/26/2022) for Follow up on labs.    Please note that this dictation was created using voice recognition software. I have made every reasonable attempt to correct obvious errors, but I expect that there are errors of grammar and possibly content that I did not discover before finalizing the note.

## 2021-08-26 NOTE — ASSESSMENT & PLAN NOTE
Lab Results   Component Value Date/Time    WBC 5.0 08/19/2021 06:49 AM    RBC 4.83 08/19/2021 06:49 AM    HEMOGLOBIN 14.2 08/19/2021 06:49 AM    HEMATOCRIT 45.9 08/19/2021 06:49 AM    MCV 95.0 08/19/2021 06:49 AM    MCH 29.4 08/19/2021 06:49 AM    MCHC 30.9 (L) 08/19/2021 06:49 AM    RDW 49.5 08/19/2021 06:49 AM    PLATELETCT 106 (L) 08/19/2021 06:49 AM    MPV 11.6 08/19/2021 06:49 AM    NEUTSPOLYS 63.30 08/19/2021 06:49 AM    LYMPHOCYTES 18.80 (L) 08/19/2021 06:49 AM    MONOCYTES 14.90 (H) 08/19/2021 06:49 AM    EOSINOPHILS 1.60 08/19/2021 06:49 AM    BASOPHILS 0.60 08/19/2021 06:49 AM    IMMGRAN 0.80 08/19/2021 06:49 AM    NRBC 0.00 08/19/2021 06:49 AM    NEUTS 3.19 08/19/2021 06:49 AM    LYMPHS 0.95 (L) 08/19/2021 06:49 AM    LYMPHS 1 09/24/2005 07:45 PM    MONOS 0.75 08/19/2021 06:49 AM    EOS 0.08 08/19/2021 06:49 AM    BASO 0.03 08/19/2021 06:49 AM    IMMGRANAB 0.04 08/19/2021 06:49 AM    NRBCAB 0.00 08/19/2021 06:49 AM   ]  Stable overall.

## 2021-08-26 NOTE — ASSESSMENT & PLAN NOTE
This is a chronic condition.   Latest Labs:   Lab Results   Component Value Date/Time    CHOLSTRLTOT 127 05/18/2021 07:26 AM    LDL 47 05/18/2021 07:26 AM    HDL 52 05/18/2021 07:26 AM    TRIGLYCERIDE 142 05/18/2021 07:26 AM      Medications: Atorvastatin 80 mg daily.

## 2021-08-26 NOTE — ASSESSMENT & PLAN NOTE
Patient continues to follow-up with Dr. León.  Most recent labs show BUN of 48, creatinine 1.89 and GFR of 36.  BUN and creatinine slightly elevated compared to previous.  Overall stable.

## 2021-08-26 NOTE — ASSESSMENT & PLAN NOTE
This is a chronic condition.  Current Meds: metoprolol 25 mg daily  Associated symptoms:  denies any chest pain, sob, headaches, dizziness/lightheadedness

## 2021-08-26 NOTE — ASSESSMENT & PLAN NOTE
This is a chronic condition.  Current medications: Glyburide 5 mg 2 tabs twice a day, Tradjenta 5 mg daily, Actos 45 mg daily.  Last A1c:   Lab Results   Component Value Date/Time    HBA1C 6.3 (H) 08/19/2021 0649    AVGLUC 134 08/19/2021 0649     Last Microalb/Cr ratio:   Lab Results   Component Value Date/Time    URCREAT 66.78 08/19/2021 0649    MICROALBUR <1.2 08/19/2021 0649    MALBCRT see below 08/19/2021 0649

## 2021-08-31 ENCOUNTER — TELEPHONE (OUTPATIENT)
Dept: MEDICAL GROUP | Facility: PHYSICIAN GROUP | Age: 66
End: 2021-08-31

## 2021-08-31 DIAGNOSIS — Z12.83 SKIN CANCER SCREENING: ICD-10-CM

## 2021-08-31 NOTE — TELEPHONE ENCOUNTER
Jama called into clinic.    Jama is request a referral to [facility].    Please sign or advise other wise.

## 2021-09-09 RX ORDER — TADALAFIL 5 MG/1
TABLET ORAL
Qty: 15 TABLET | Refills: 1 | Status: SHIPPED | OUTPATIENT
Start: 2021-09-09 | End: 2022-02-22 | Stop reason: SDUPTHER

## 2021-09-14 ENCOUNTER — HOSPITAL ENCOUNTER (OUTPATIENT)
Dept: RADIOLOGY | Facility: MEDICAL CENTER | Age: 66
End: 2021-09-14
Attending: INTERNAL MEDICINE
Payer: COMMERCIAL

## 2021-09-14 DIAGNOSIS — E78.5 HYPERLIPIDEMIA, UNSPECIFIED HYPERLIPIDEMIA TYPE: ICD-10-CM

## 2021-09-14 DIAGNOSIS — N25.81 HYPERPARATHYROIDISM, SECONDARY (HCC): ICD-10-CM

## 2021-09-14 DIAGNOSIS — Z94.0 HX OF KIDNEY TRANSPLANT: ICD-10-CM

## 2021-09-14 DIAGNOSIS — I12.9 RENAL HYPERTENSION: ICD-10-CM

## 2021-09-14 DIAGNOSIS — E11.9 DIABETES MELLITUS WITHOUT COMPLICATION (HCC): ICD-10-CM

## 2021-09-14 DIAGNOSIS — D89.9 IMMUNE DISORDER (HCC): ICD-10-CM

## 2021-09-14 DIAGNOSIS — N20.0 NEPHROLITHIASIS, URIC ACID: ICD-10-CM

## 2021-09-14 DIAGNOSIS — M10.9 GOUT, UNSPECIFIED CAUSE, UNSPECIFIED CHRONICITY, UNSPECIFIED SITE: ICD-10-CM

## 2021-09-14 PROCEDURE — 76700 US EXAM ABDOM COMPLETE: CPT

## 2021-09-23 NOTE — TELEPHONE ENCOUNTER
----- Message from Delmi Curiel P.A.-C. sent at 12/9/2020  8:30 AM PST -----  Please call patient about their results.     Results showed: HgA1c had a big jump from your last one. Was 6.4 and it is at 10.0. We will discuss these labs in detail at our visit in January but would recommend increasing you glyburide to 5 mg BID now, and will discuss other options at your next visit. I will call in a new prescription for you.     If you have any questions or concerns, do not hesitate to contact me or my Medical Assistant. Thank you for your time today.     Respectfully,     Delmi Curiel PA-C    
1st attempt: Left message on recorder to please call back about this MSG. [phone]    
2nd attempt: Left message on recorder to please call back about this MSG. [phone]    
After reviewing the patient's chart I see that he is already on glyburide twice a day. Can we call the patient and confirm his dosage?     Thank you,     Delmi Curiel PA-C   
I spoke to Jama.    Jama advised of medication change.  
I spoke to Jama.    Jama stated that he is taking 5 MG twice a day AM and PM.  
Will increase glyburide to 10 mg BID and discuss labs in detail at next visit.   
Detail Level: Zone
Detail Level: Detailed

## 2021-09-24 ENCOUNTER — NON-PROVIDER VISIT (OUTPATIENT)
Dept: MEDICAL GROUP | Facility: PHYSICIAN GROUP | Age: 66
End: 2021-09-24
Payer: COMMERCIAL

## 2021-09-24 DIAGNOSIS — Z23 NEED FOR VACCINATION: ICD-10-CM

## 2021-09-24 PROCEDURE — 90750 HZV VACC RECOMBINANT IM: CPT | Performed by: PHYSICIAN ASSISTANT

## 2021-09-24 PROCEDURE — 90686 IIV4 VACC NO PRSV 0.5 ML IM: CPT | Performed by: PHYSICIAN ASSISTANT

## 2021-09-24 PROCEDURE — 90471 IMMUNIZATION ADMIN: CPT | Performed by: PHYSICIAN ASSISTANT

## 2021-09-24 PROCEDURE — 90472 IMMUNIZATION ADMIN EACH ADD: CPT | Performed by: PHYSICIAN ASSISTANT

## 2021-09-24 NOTE — PROGRESS NOTES
"Jama Altman is a 65 y.o. male here for a non-provider visit for:   FLU and Shingrix    Reason for immunization: Annual Flu Vaccine  Immunization records indicate need for vaccine: Yes, confirmed with Epic  Minimum interval has been met for this vaccine: Yes  ABN completed: Yes    VIS Dated 08/06/2021/10/30/2019  was given to patient: Yes  All IAC Questionnaire questions were answered \"No.\"    Patient tolerated injection and no adverse effects were observed or reported: Yes    Pt scheduled for next dose in series: No  "

## 2021-10-28 ENCOUNTER — OFFICE VISIT (OUTPATIENT)
Dept: MEDICAL GROUP | Facility: PHYSICIAN GROUP | Age: 66
End: 2021-10-28
Payer: COMMERCIAL

## 2021-10-28 VITALS
WEIGHT: 250.3 LBS | HEIGHT: 77 IN | SYSTOLIC BLOOD PRESSURE: 124 MMHG | HEART RATE: 62 BPM | RESPIRATION RATE: 16 BRPM | OXYGEN SATURATION: 98 % | DIASTOLIC BLOOD PRESSURE: 72 MMHG | TEMPERATURE: 98.6 F | BODY MASS INDEX: 29.55 KG/M2

## 2021-10-28 DIAGNOSIS — R19.7 DIARRHEA, UNSPECIFIED TYPE: ICD-10-CM

## 2021-10-28 DIAGNOSIS — R10.11 RIGHT UPPER QUADRANT ABDOMINAL PAIN: ICD-10-CM

## 2021-10-28 PROBLEM — R11.2 NAUSEA VOMITING AND DIARRHEA: Status: ACTIVE | Noted: 2021-10-28

## 2021-10-28 PROCEDURE — 99213 OFFICE O/P EST LOW 20 MIN: CPT | Performed by: PHYSICIAN ASSISTANT

## 2021-10-28 ASSESSMENT — FIBROSIS 4 INDEX: FIB4 SCORE: 2.72

## 2021-10-28 NOTE — PROGRESS NOTES
CC:   Chief Complaint   Patient presents with   • Diarrhea     intermittant/x 3 weeks          HISTORY OF PRESENT ILLNESS: Patient is a 65 y.o. male established patient who presents today acute complaint of:     Right upper quadrant abdominal pain  3 weeks abdominal pain, predominately RUQ and RLQ, started after flu and shingles vaccine. Describes as diarrhea and loose stools. Taking Kalpectate, Usually worse with fatty, greasy foods. Some fatigue. 2 BMs per day, 1 normal, 1 loose. No CP, SOB or palpitations.     His pain and symptoms seem cyclic. It comes and goes, again worse with some foods he eats.       Patient Active Problem List    Diagnosis Date Noted   • Right upper quadrant abdominal pain 10/28/2021   • Arthralgia 05/26/2021   • Right hip pain 01/20/2021   • COVID-19 11/13/2020   • Thrombocytopenia ([facility]) 10/20/2020   • Hypertension 07/09/2018   • GERD (gastroesophageal reflux disease) 07/09/2018   • Polycystic kidney 07/09/2018   • Gout 07/09/2018   • Anemia of chronic disease 09/28/2017   • Pneumonia 09/27/2017   • Sepsis(995.91) 09/27/2017   • Weakness 03/04/2014   • Type 2 diabetes mellitus with kidney complication, without long-term current use of insulin ([facility]) 01/09/2014   • S/P insertion of non-drug eluting coronary artery stent 07/08/2013   • History of atrial fibrillation 07/08/2013   • CAD (coronary artery disease) 07/08/2013   • Dyslipidemia    • History of MI (myocardial infarction) 06/26/2013   • Renal Transplant 9/10/2010 2nd to Polycystic Kidney Disease 06/26/2013   • Stage 3b chronic kidney disease (HCC) 06/26/2013      Allergies:Doxycycline    Current Outpatient Medications   Medication Sig Dispense Refill   • tadalafil (CIALIS) 5 MG tablet TAKE 1 TABLETS 30 MINUTES BEFORE SEXUAL ACTIVITY, DO NOT EXCEED MORE THAN ONE DOSE A DAY 15 Tablet 1   • glyBURIDE (DIABETA) 5 MG Tab TAKE TWO TABLETS BY MOUTH TWICE DAILY WITH MEALS  360 tablet 1   • gabapentin (NEURONTIN) 300 MG Cap TAKE ONE CAPSULE BY  "MOUTH FOUR TIMES DAILY  360 capsule 1   • predniSONE (DELTASONE) 5 MG Tab Take 1 tablet by mouth every day. Take with food 90 tablet 1   • mycophenolate (CELLCEPT) 250 MG Cap      • omeprazole (PRILOSEC) 20 MG delayed-release capsule      • atorvastatin (LIPITOR) 80 MG tablet Take 1 tablet by mouth at bedtime. 90 tablet 1   • metoprolol tartrate (LOPRESSOR) 25 MG Tab Take 1 tablet by mouth every day. 90 tablet 1   • pioglitazone (ACTOS) 45 MG Tab Take 1 tablet by mouth every day. 90 tablet 1   • linagliptin (TRADJENTA) 5 MG Tab tablet Take 1 tablet by mouth every day. 90 tablet 3   • cholecalciferol (VITAMIN D3) 5000 UNIT Cap Take 5,000 Units by mouth every 48 hours.     • tacrolimus (PROGRAF) 1 MG CAPS Take 1 mg by mouth 2 times a day.       No current facility-administered medications for this visit.       Social History     Tobacco Use   • Smoking status: Never Smoker   • Smokeless tobacco: Never Used   Vaping Use   • Vaping Use: Never used   Substance Use Topics   • Alcohol use: No   • Drug use: No     Social History     Social History Narrative   • Not on file       History reviewed. No pertinent family history.    Review of Systems:    Constitutional: No Fevers, Chills  Eyes: No vision changes  ENT: No hearing changes  Resp: No Shortness of breath  CV: No Chest pain  GI: No Nausea/Vomiting  MSK: No weakness  Skin: No rashes  Neuro: No Headaches  Psych: No Suicidal ideations    All remaining systems reviewed and found to be negative, except as stated above.    Exam:    /72 (BP Location: Left arm, Patient Position: Sitting, BP Cuff Size: Large adult)   Pulse 62   Temp 37 °C (98.6 °F) (Temporal)   Resp 16   Ht 1.956 m (6' 5\")   Wt 114 kg (250 lb 4.8 oz)   SpO2 98%  Body mass index is 29.68 kg/m².    General:  Well nourished, well developed male in NAD  HENT: Atraumatic, normocephalic  EYES: Extraocular movements intact  NECK: Supple, FROM  CHEST: No deformities, Equal chest expansion  RESP: Unlabored, " no stridor or audible wheeze  HEART: Regular Rate and rhythm.   ABD: Soft, Nontender, Non-Distended, negative Song sign, no rebound tenderness.  NABS.  Extremities: No Clubbing, Cyanosis, or Edema  Skin: Warm/dry, without rashes  Neuro: A/O x 4, due to COVID-19- did not have patient remove face mask to test cranial nerves.  Motor/sensory grossly intact  Psych: Normal behavior, normal affect      Lab review:  Labs are reviewed and discussed with a patient  Lab Results   Component Value Date/Time    CHOLSTRLTOT 127 05/18/2021 07:26 AM    LDL 47 05/18/2021 07:26 AM    HDL 52 05/18/2021 07:26 AM    TRIGLYCERIDE 142 05/18/2021 07:26 AM       Lab Results   Component Value Date/Time    SODIUM 142 08/19/2021 06:49 AM    POTASSIUM 4.7 08/19/2021 06:49 AM    CHLORIDE 106 08/19/2021 06:49 AM    CO2 22 08/19/2021 06:49 AM    GLUCOSE 74 08/19/2021 06:49 AM    BUN 48 (H) 08/19/2021 06:49 AM    CREATININE 1.89 (H) 08/19/2021 06:49 AM    CREATININE 2.4 (H) 12/18/2006 06:20 AM     Lab Results   Component Value Date/Time    ALKPHOSPHAT 68 08/19/2021 06:49 AM    ASTSGOT 18 08/19/2021 06:49 AM    ALTSGPT 16 08/19/2021 06:49 AM    TBILIRUBIN 0.5 08/19/2021 06:49 AM      Lab Results   Component Value Date/Time    HBA1C 6.3 (H) 08/19/2021 06:49 AM    HBA1C 6.3 (A) 08/19/2021 06:49 AM    HBA1C 6.8 (H) 05/18/2021 07:20 AM    HBA1C 6.8 (A) 05/18/2021 07:20 AM    HBA1C 8.1 (H) 02/10/2021 07:05 AM     No results found for: TSH  No results found for: FREET4    Lab Results   Component Value Date/Time    WBC 5.0 08/19/2021 06:49 AM    RBC 4.83 08/19/2021 06:49 AM    HEMOGLOBIN 14.2 08/19/2021 06:49 AM    HEMATOCRIT 45.9 08/19/2021 06:49 AM    MCV 95.0 08/19/2021 06:49 AM    MCH 29.4 08/19/2021 06:49 AM    MCHC 30.9 (L) 08/19/2021 06:49 AM    MPV 11.6 08/19/2021 06:49 AM    NEUTSPOLYS 63.30 08/19/2021 06:49 AM    LYMPHOCYTES 18.80 (L) 08/19/2021 06:49 AM    MONOCYTES 14.90 (H) 08/19/2021 06:49 AM    EOSINOPHILS 1.60 08/19/2021 06:49 AM     BASOPHILS 0.60 08/19/2021 06:49 AM    ANISOCYTOSIS 1+ 09/28/2017 04:09 AM          Assessment/Plan:  1. Right upper quadrant abdominal pain  - CBC WITH DIFFERENTIAL; Future  - HEPATIC FUNCTION PANEL; Future  2. Diarrhea, unspecified type  - CULTURE STOOL; Future  - Complete O&P; Future  - OCCULT BLOOD FECES IMMUNOASSAY; Future  Reviewed ultrasound from September with patient again today.  He is having mild intermittent right mid abdominal pain.  With some loose stools.  Occasionally associated with food he eats.  His gallbladder looked unremarkable on his recent ultrasound.  Unremarkable physical exam.  He is no fever or chills.  No nausea or vomiting.  Discussed with patient further work-up including ruling out infectious component with stool cultures in addition to getting a CBC and liver enzymes.  His symptoms seem well controlled with over-the-counter medication.  We will continue this for now.  If his symptoms worsen or change in any way would like him to be reevaluated.  If his symptoms are side effect from the shingles vaccine and flu vaccine, they should be self-limiting in the next week or 2.    Follow-up: Return if symptoms worsen or fail to improve.    Please note that this dictation was created using voice recognition software. I have made every reasonable attempt to correct obvious errors, but I expect that there are errors of grammar and possibly content that I did not discover before finalizing the note.

## 2021-10-28 NOTE — ASSESSMENT & PLAN NOTE
3 weeks abdominal pain, predominately RUQ and RLQ, started after flu and shingles vaccine. Describes as diarrhea and loose stools. Taking Kalpectate, Usually worse with fatty, greasy foods. Some fatigue. 2 BMs per day, 1 normal, 1 loose. No CP, SOB or palpitations.     His pain and symptoms seem cyclic. It comes and goes, again worse with some foods he eats.

## 2021-11-01 ENCOUNTER — HOSPITAL ENCOUNTER (OUTPATIENT)
Dept: LAB | Facility: MEDICAL CENTER | Age: 66
End: 2021-11-01
Attending: NURSE PRACTITIONER
Payer: COMMERCIAL

## 2021-11-01 ENCOUNTER — HOSPITAL ENCOUNTER (OUTPATIENT)
Dept: LAB | Facility: MEDICAL CENTER | Age: 66
End: 2021-11-01
Attending: PHYSICIAN ASSISTANT
Payer: COMMERCIAL

## 2021-11-01 DIAGNOSIS — R10.11 RIGHT UPPER QUADRANT ABDOMINAL PAIN: ICD-10-CM

## 2021-11-01 LAB
ALBUMIN SERPL BCP-MCNC: 4.4 G/DL (ref 3.2–4.9)
ALBUMIN SERPL BCP-MCNC: 4.4 G/DL (ref 3.2–4.9)
ALBUMIN/GLOB SERPL: 2.2 G/DL
ALP SERPL-CCNC: 70 U/L (ref 30–99)
ALP SERPL-CCNC: 70 U/L (ref 30–99)
ALT SERPL-CCNC: 17 U/L (ref 2–50)
ALT SERPL-CCNC: 18 U/L (ref 2–50)
ANION GAP SERPL CALC-SCNC: 12 MMOL/L (ref 7–16)
APPEARANCE UR: CLEAR
AST SERPL-CCNC: 16 U/L (ref 12–45)
AST SERPL-CCNC: 17 U/L (ref 12–45)
BASOPHILS # BLD AUTO: 0.8 % (ref 0–1.8)
BASOPHILS # BLD AUTO: 0.8 % (ref 0–1.8)
BASOPHILS # BLD: 0.04 K/UL (ref 0–0.12)
BASOPHILS # BLD: 0.04 K/UL (ref 0–0.12)
BILIRUB CONJ SERPL-MCNC: <0.2 MG/DL (ref 0.1–0.5)
BILIRUB INDIRECT SERPL-MCNC: NORMAL MG/DL (ref 0–1)
BILIRUB SERPL-MCNC: 0.6 MG/DL (ref 0.1–1.5)
BILIRUB SERPL-MCNC: 0.6 MG/DL (ref 0.1–1.5)
BILIRUB UR QL STRIP.AUTO: NEGATIVE
BUN SERPL-MCNC: 29 MG/DL (ref 8–22)
CALCIUM SERPL-MCNC: 9.5 MG/DL (ref 8.5–10.5)
CHLORIDE SERPL-SCNC: 107 MMOL/L (ref 96–112)
CO2 SERPL-SCNC: 23 MMOL/L (ref 20–33)
COLOR UR: YELLOW
CREAT SERPL-MCNC: 1.57 MG/DL (ref 0.5–1.4)
CREAT UR-MCNC: 81.33 MG/DL
EOSINOPHIL # BLD AUTO: 0.06 K/UL (ref 0–0.51)
EOSINOPHIL # BLD AUTO: 0.08 K/UL (ref 0–0.51)
EOSINOPHIL NFR BLD: 1.3 % (ref 0–6.9)
EOSINOPHIL NFR BLD: 1.7 % (ref 0–6.9)
ERYTHROCYTE [DISTWIDTH] IN BLOOD BY AUTOMATED COUNT: 46.9 FL (ref 35.9–50)
ERYTHROCYTE [DISTWIDTH] IN BLOOD BY AUTOMATED COUNT: 47.1 FL (ref 35.9–50)
EST. AVERAGE GLUCOSE BLD GHB EST-MCNC: 137 MG/DL
GLOBULIN SER CALC-MCNC: 2 G/DL (ref 1.9–3.5)
GLUCOSE SERPL-MCNC: 91 MG/DL (ref 65–99)
GLUCOSE UR STRIP.AUTO-MCNC: NEGATIVE MG/DL
HBA1C MFR BLD: 6.4 % (ref 4–5.6)
HCT VFR BLD AUTO: 44.6 % (ref 42–52)
HCT VFR BLD AUTO: 44.8 % (ref 42–52)
HGB BLD-MCNC: 14 G/DL (ref 14–18)
HGB BLD-MCNC: 14.1 G/DL (ref 14–18)
IMM GRANULOCYTES # BLD AUTO: 0.05 K/UL (ref 0–0.11)
IMM GRANULOCYTES # BLD AUTO: 0.05 K/UL (ref 0–0.11)
IMM GRANULOCYTES NFR BLD AUTO: 1 % (ref 0–0.9)
IMM GRANULOCYTES NFR BLD AUTO: 1 % (ref 0–0.9)
KETONES UR STRIP.AUTO-MCNC: NEGATIVE MG/DL
LEUKOCYTE ESTERASE UR QL STRIP.AUTO: NEGATIVE
LYMPHOCYTES # BLD AUTO: 0.67 K/UL (ref 1–4.8)
LYMPHOCYTES # BLD AUTO: 0.71 K/UL (ref 1–4.8)
LYMPHOCYTES NFR BLD: 14 % (ref 22–41)
LYMPHOCYTES NFR BLD: 14.9 % (ref 22–41)
MAGNESIUM SERPL-MCNC: 1.7 MG/DL (ref 1.5–2.5)
MCH RBC QN AUTO: 29.1 PG (ref 27–33)
MCH RBC QN AUTO: 29.2 PG (ref 27–33)
MCHC RBC AUTO-ENTMCNC: 31.4 G/DL (ref 33.7–35.3)
MCHC RBC AUTO-ENTMCNC: 31.5 G/DL (ref 33.7–35.3)
MCV RBC AUTO: 92.7 FL (ref 81.4–97.8)
MCV RBC AUTO: 92.8 FL (ref 81.4–97.8)
MICRO URNS: NORMAL
MONOCYTES # BLD AUTO: 0.63 K/UL (ref 0–0.85)
MONOCYTES # BLD AUTO: 0.63 K/UL (ref 0–0.85)
MONOCYTES NFR BLD AUTO: 13.2 % (ref 0–13.4)
MONOCYTES NFR BLD AUTO: 13.2 % (ref 0–13.4)
NEUTROPHILS # BLD AUTO: 3.27 K/UL (ref 1.82–7.42)
NEUTROPHILS # BLD AUTO: 3.34 K/UL (ref 1.82–7.42)
NEUTROPHILS NFR BLD: 68.4 % (ref 44–72)
NEUTROPHILS NFR BLD: 69.7 % (ref 44–72)
NITRITE UR QL STRIP.AUTO: NEGATIVE
NRBC # BLD AUTO: 0 K/UL
NRBC # BLD AUTO: 0 K/UL
NRBC BLD-RTO: 0 /100 WBC
NRBC BLD-RTO: 0 /100 WBC
PH UR STRIP.AUTO: 6 [PH] (ref 5–8)
PLATELET # BLD AUTO: 102 K/UL (ref 164–446)
PLATELET # BLD AUTO: 104 K/UL (ref 164–446)
PMV BLD AUTO: 11.9 FL (ref 9–12.9)
PMV BLD AUTO: 12.2 FL (ref 9–12.9)
POTASSIUM SERPL-SCNC: 4 MMOL/L (ref 3.6–5.5)
PROT SERPL-MCNC: 6.4 G/DL (ref 6–8.2)
PROT SERPL-MCNC: 6.5 G/DL (ref 6–8.2)
PROT UR QL STRIP: NEGATIVE MG/DL
PROT UR-MCNC: 5 MG/DL (ref 0–15)
PROT/CREAT UR: 61 MG/G (ref 15–68)
RBC # BLD AUTO: 4.81 M/UL (ref 4.7–6.1)
RBC # BLD AUTO: 4.83 M/UL (ref 4.7–6.1)
RBC UR QL AUTO: NEGATIVE
SODIUM SERPL-SCNC: 142 MMOL/L (ref 135–145)
SP GR UR STRIP.AUTO: 1.02
UROBILINOGEN UR STRIP.AUTO-MCNC: 0.2 MG/DL
WBC # BLD AUTO: 4.8 K/UL (ref 4.8–10.8)
WBC # BLD AUTO: 4.8 K/UL (ref 4.8–10.8)

## 2021-11-01 PROCEDURE — 80053 COMPREHEN METABOLIC PANEL: CPT

## 2021-11-01 PROCEDURE — 82570 ASSAY OF URINE CREATININE: CPT

## 2021-11-01 PROCEDURE — 80076 HEPATIC FUNCTION PANEL: CPT

## 2021-11-01 PROCEDURE — 83735 ASSAY OF MAGNESIUM: CPT

## 2021-11-01 PROCEDURE — 84156 ASSAY OF PROTEIN URINE: CPT

## 2021-11-01 PROCEDURE — 85025 COMPLETE CBC W/AUTO DIFF WBC: CPT | Mod: 91

## 2021-11-01 PROCEDURE — 83036 HEMOGLOBIN GLYCOSYLATED A1C: CPT

## 2021-11-01 PROCEDURE — 85025 COMPLETE CBC W/AUTO DIFF WBC: CPT

## 2021-11-01 PROCEDURE — 80197 ASSAY OF TACROLIMUS: CPT

## 2021-11-01 PROCEDURE — 81003 URINALYSIS AUTO W/O SCOPE: CPT

## 2021-11-01 PROCEDURE — 36415 COLL VENOUS BLD VENIPUNCTURE: CPT

## 2021-11-02 ENCOUNTER — HOSPITAL ENCOUNTER (OUTPATIENT)
Facility: MEDICAL CENTER | Age: 66
End: 2021-11-02
Attending: PHYSICIAN ASSISTANT
Payer: COMMERCIAL

## 2021-11-02 PROCEDURE — 87899 AGENT NOS ASSAY W/OPTIC: CPT

## 2021-11-02 PROCEDURE — 87329 GIARDIA AG IA: CPT

## 2021-11-02 PROCEDURE — 87328 CRYPTOSPORIDIUM AG IA: CPT

## 2021-11-02 PROCEDURE — 87045 FECES CULTURE AEROBIC BACT: CPT

## 2021-11-03 ENCOUNTER — HOSPITAL ENCOUNTER (OUTPATIENT)
Facility: MEDICAL CENTER | Age: 66
End: 2021-11-03
Attending: PHYSICIAN ASSISTANT
Payer: COMMERCIAL

## 2021-11-03 DIAGNOSIS — R19.7 DIARRHEA, UNSPECIFIED TYPE: ICD-10-CM

## 2021-11-03 LAB
G LAMBLIA+C PARVUM AG STL QL RAPID: NORMAL
SIGNIFICANT IND 70042: NORMAL
SITE SITE: NORMAL
SOURCE SOURCE: NORMAL
TACROLIMUS BLD-MCNC: 5.9 NG/ML

## 2021-11-03 PROCEDURE — 82274 ASSAY TEST FOR BLOOD FECAL: CPT

## 2021-11-04 LAB
E COLI SXT1+2 STL IA: NORMAL
SIGNIFICANT IND 70042: NORMAL
SITE SITE: NORMAL
SOURCE SOURCE: NORMAL

## 2021-11-05 LAB
BACTERIA STL CULT: NORMAL
C JEJUNI+C COLI AG STL QL: NORMAL
E COLI SXT1+2 STL IA: NORMAL
SIGNIFICANT IND 70042: NORMAL
SITE SITE: NORMAL
SOURCE SOURCE: NORMAL

## 2021-11-08 ENCOUNTER — TELEPHONE (OUTPATIENT)
Dept: MEDICAL GROUP | Facility: PHYSICIAN GROUP | Age: 66
End: 2021-11-08

## 2021-11-08 NOTE — TELEPHONE ENCOUNTER
----- Message from Delmi Curiel P.A.-C. sent at 11/6/2021  6:30 AM PDT -----  Please call patient about their results.     Results showed: negative stool cultures.     Thank you,    Delmi Curiel PA-C

## 2021-11-09 DIAGNOSIS — R19.7 DIARRHEA, UNSPECIFIED TYPE: ICD-10-CM

## 2021-11-11 LAB — IMM ASSAY OCC BLD FITOB: POSITIVE

## 2021-11-15 ENCOUNTER — TELEPHONE (OUTPATIENT)
Dept: MEDICAL GROUP | Facility: PHYSICIAN GROUP | Age: 66
End: 2021-11-15

## 2021-11-15 DIAGNOSIS — Z94.0 STATUS POST KIDNEY TRANSPLANT: ICD-10-CM

## 2021-11-15 NOTE — TELEPHONE ENCOUNTER
Called pt to let him know about his lab result was negative.     Results showed: negative stool cultures    Pt has no further questions or concerns

## 2021-11-15 NOTE — TELEPHONE ENCOUNTER
1. Caller Name: KennedyMary                        Call Back Number: [phone]      How would the patient prefer to be contacted with a response:     2. SPECIFIC Action To Be Taken: Referral pending, please sign.    3. Diagnosis/Clinical Reason for Request: kidney transplant status    4. Specialty & Provider Name/Lab/Imaging Location: Dr Zuleyma Sheppard/Mary    5. Is appointment scheduled for requested order/referral: yes - 10/06/2021    Patient was not informed they will receive a return phone call from the office ONLY if there are any questions before processing their request. Advised to call back if they haven't received a call from the referral department in 5 days.

## 2021-11-26 ENCOUNTER — TELEPHONE (OUTPATIENT)
Dept: HEALTH INFORMATION MANAGEMENT | Facility: OTHER | Age: 66
End: 2021-11-26

## 2021-11-29 RX ORDER — PIOGLITAZONEHYDROCHLORIDE 45 MG/1
45 TABLET ORAL DAILY
Qty: 90 TABLET | Refills: 1 | Status: ON HOLD | OUTPATIENT
Start: 2021-11-29 | End: 2022-06-22

## 2021-12-07 ENCOUNTER — TELEPHONE (OUTPATIENT)
Dept: MEDICAL GROUP | Facility: PHYSICIAN GROUP | Age: 66
End: 2021-12-07

## 2021-12-16 ENCOUNTER — OFFICE VISIT (OUTPATIENT)
Dept: MEDICAL GROUP | Facility: PHYSICIAN GROUP | Age: 66
End: 2021-12-16
Payer: MEDICARE

## 2021-12-16 VITALS
BODY MASS INDEX: 29.4 KG/M2 | OXYGEN SATURATION: 98 % | RESPIRATION RATE: 18 BRPM | DIASTOLIC BLOOD PRESSURE: 74 MMHG | WEIGHT: 249 LBS | HEIGHT: 77 IN | SYSTOLIC BLOOD PRESSURE: 118 MMHG | HEART RATE: 62 BPM | TEMPERATURE: 98.6 F

## 2021-12-16 DIAGNOSIS — E11.69 TYPE 2 DIABETES MELLITUS WITH HYPERLIPIDEMIA (HCC): ICD-10-CM

## 2021-12-16 DIAGNOSIS — E78.5 TYPE 2 DIABETES MELLITUS WITH HYPERLIPIDEMIA (HCC): ICD-10-CM

## 2021-12-16 DIAGNOSIS — Z86.79 HISTORY OF ATRIAL FIBRILLATION: ICD-10-CM

## 2021-12-16 DIAGNOSIS — E11.9 DIABETES MELLITUS WITH COINCIDENT HYPERTENSION (HCC): ICD-10-CM

## 2021-12-16 DIAGNOSIS — Z76.89 ENCOUNTER TO ESTABLISH CARE: ICD-10-CM

## 2021-12-16 DIAGNOSIS — I10 DIABETES MELLITUS WITH COINCIDENT HYPERTENSION (HCC): ICD-10-CM

## 2021-12-16 DIAGNOSIS — E66.3 OVERWEIGHT (BMI 25.0-29.9): ICD-10-CM

## 2021-12-16 DIAGNOSIS — I48.0 PAF (PAROXYSMAL ATRIAL FIBRILLATION) (HCC): ICD-10-CM

## 2021-12-16 PROBLEM — J18.9 PNEUMONIA: Status: RESOLVED | Noted: 2017-09-27 | Resolved: 2021-12-16

## 2021-12-16 PROBLEM — A41.9 SEPSIS (HCC): Status: RESOLVED | Noted: 2017-09-27 | Resolved: 2021-12-16

## 2021-12-16 PROCEDURE — 99213 OFFICE O/P EST LOW 20 MIN: CPT | Performed by: FAMILY MEDICINE

## 2021-12-16 ASSESSMENT — FIBROSIS 4 INDEX: FIB4 SCORE: 2.58

## 2021-12-16 NOTE — ASSESSMENT & PLAN NOTE
These are both chronic conditions.  They are both under good control and recent lab work was very good.

## 2021-12-16 NOTE — ASSESSMENT & PLAN NOTE
Patient is here today to establish care.  He is current on his labs and exam.  He has no particular concerns on today's visit.

## 2021-12-16 NOTE — PROGRESS NOTES
Subjective:     CC: Here to establish care.    HPI:   Jama presents today with the following medical concerns:    Encounter to establish care  Patient is here today to establish care.  He is current on his labs and exam.  He has no particular concerns on today's visit.    Overweight (BMI 25.0-29.9)  This is a chronic problem.  Continue to monitor and work on weight reduction.    Type 2 diabetes mellitus with hyperlipidemia (HCC)  These are both chronic conditions.  They are both under good control and recent lab work was very good.    Diabetes mellitus with coincident hypertension (HCC)  These are both chronic conditions.  They are both under good control.  Continue to follow.    History of atrial fibrillation  This is a chronic problem.  Patient has a history of atrial fibrillation.  Rate is controlled on metoprolol and is on an aspirin a day.  No current symptoms.      Past Medical History:   Diagnosis Date   • Benign essential hypertension    • Hyperlipoproteinemia    • Hypertension     not on meds anymore   • Pain    • Polycystic kidney 9/10/10    RIGHT KIDNEY TRANSPLANT   • Sleep apnea    • Snoring        Social History     Tobacco Use   • Smoking status: Never Smoker   • Smokeless tobacco: Never Used   Vaping Use   • Vaping Use: Never used   Substance Use Topics   • Alcohol use: No   • Drug use: No       Current Outpatient Medications Ordered in Epic   Medication Sig Dispense Refill   • metoprolol tartrate (LOPRESSOR) 25 MG Tab Take 1 Tablet by mouth every day. 90 Tablet 1   • pioglitazone (ACTOS) 45 MG Tab Take 1 Tablet by mouth every day. 90 Tablet 1   • tadalafil (CIALIS) 5 MG tablet TAKE 1 TABLETS 30 MINUTES BEFORE SEXUAL ACTIVITY, DO NOT EXCEED MORE THAN ONE DOSE A DAY 15 Tablet 1   • glyBURIDE (DIABETA) 5 MG Tab TAKE TWO TABLETS BY MOUTH TWICE DAILY WITH MEALS  360 tablet 1   • gabapentin (NEURONTIN) 300 MG Cap TAKE ONE CAPSULE BY MOUTH FOUR TIMES DAILY  360 capsule 1   • predniSONE (DELTASONE) 5 MG Tab  "Take 1 tablet by mouth every day. Take with food 90 tablet 1   • mycophenolate (CELLCEPT) 250 MG Cap      • omeprazole (PRILOSEC) 20 MG delayed-release capsule      • atorvastatin (LIPITOR) 80 MG tablet Take 1 tablet by mouth at bedtime. 90 tablet 1   • linagliptin (TRADJENTA) 5 MG Tab tablet Take 1 tablet by mouth every day. 90 tablet 3   • cholecalciferol (VITAMIN D3) 5000 UNIT Cap Take 5,000 Units by mouth every 48 hours.     • tacrolimus (PROGRAF) 1 MG CAPS Take 1 mg by mouth 2 times a day.       No current [facility]-ordered facility-administered medications on file.       Allergies:  Doxycycline    Health Maintenance: Completed    ROS:  Gen: no fevers/chills, no changes in weight  Eyes: no changes in vision  ENT: no sore throat, no hearing loss, no bloody nose  Pulm: no sob, no cough  CV: no chest pain, no palpitations  GI: no nausea/vomiting, no diarrhea  : no dysuria  MSk: no myalgias  Skin: no rash  Neuro: no headaches, no numbness/tingling  Heme/Lymph: no easy bruising      Objective:       Exam:  /74 (BP Location: Right arm, Patient Position: Sitting, BP Cuff Size: Large adult)   Pulse 62   Temp 37 °C (98.6 °F) (Temporal)   Resp 18   Ht 1.956 m (6' 5\")   Wt 113 kg (249 lb)   SpO2 98%   BMI 29.53 kg/m²  Body mass index is 29.53 kg/m².    Gen: Alert and oriented, No apparent distress.        Labs: Previous lab work in the chart reviewed.    Assessment & Plan:     66 y.o. male with the following -     1. Diabetes mellitus with coincident hypertension (HCC)  These are chronic conditions.  Continue to follow.  He is on appropriate medications.  Next labs due in 3 months.    2. Type 2 diabetes mellitus with hyperlipidemia (HCC)  This is a chronic condition.  Continue to follow.    3. Overweight (BMI 25.0-29.9)  This is a chronic condition.  Continue to monitor and work on weight reduction.    4. Encounter to establish care  Patient establish care with me today.  We will see him back in about 3 months " and as needed.      Return in about 3 months (around 3/16/2022) for Long.  24 minutes spent with the patient.  Please note that this dictation was created using voice recognition software. I have made every reasonable attempt to correct obvious errors, but I expect that there are errors of grammar and possibly content that I did not discover before finalizing the note.

## 2021-12-16 NOTE — ASSESSMENT & PLAN NOTE
This is a chronic problem.  Patient has a history of atrial fibrillation.  Rate is controlled on metoprolol and is on an aspirin a day.  No current symptoms.

## 2021-12-27 RX ORDER — PREDNISONE 5 MG/1
5 TABLET ORAL DAILY
Qty: 90 TABLET | Refills: 1 | Status: ON HOLD
Start: 2021-12-27 | End: 2022-06-22

## 2022-01-11 RX ORDER — GABAPENTIN 300 MG/1
CAPSULE ORAL
Qty: 360 CAPSULE | Refills: 1 | Status: ON HOLD
Start: 2022-01-11 | End: 2022-06-22

## 2022-01-14 RX ORDER — GLYBURIDE 5 MG/1
10 TABLET ORAL 2 TIMES DAILY WITH MEALS
Qty: 360 TABLET | Refills: 3 | Status: SHIPPED
Start: 2022-01-14 | End: 2022-06-30

## 2022-01-17 ENCOUNTER — OFFICE VISIT (OUTPATIENT)
Dept: MEDICAL GROUP | Facility: PHYSICIAN GROUP | Age: 67
End: 2022-01-17
Payer: MEDICARE

## 2022-01-17 VITALS
TEMPERATURE: 98.3 F | DIASTOLIC BLOOD PRESSURE: 76 MMHG | SYSTOLIC BLOOD PRESSURE: 122 MMHG | HEIGHT: 77 IN | HEART RATE: 98 BPM | WEIGHT: 244 LBS | BODY MASS INDEX: 28.81 KG/M2 | RESPIRATION RATE: 14 BRPM | OXYGEN SATURATION: 96 %

## 2022-01-17 DIAGNOSIS — E11.9 DIABETES MELLITUS WITH COINCIDENT HYPERTENSION (HCC): ICD-10-CM

## 2022-01-17 DIAGNOSIS — J40 BRONCHITIS: ICD-10-CM

## 2022-01-17 DIAGNOSIS — I10 DIABETES MELLITUS WITH COINCIDENT HYPERTENSION (HCC): ICD-10-CM

## 2022-01-17 PROCEDURE — 99214 OFFICE O/P EST MOD 30 MIN: CPT | Performed by: FAMILY MEDICINE

## 2022-01-17 RX ORDER — AMOXICILLIN AND CLAVULANATE POTASSIUM 875; 125 MG/1; MG/1
1 TABLET, FILM COATED ORAL 2 TIMES DAILY
Qty: 20 TABLET | Refills: 0 | Status: SHIPPED
Start: 2022-01-17 | End: 2022-04-05

## 2022-01-17 ASSESSMENT — PATIENT HEALTH QUESTIONNAIRE - PHQ9: CLINICAL INTERPRETATION OF PHQ2 SCORE: 0

## 2022-01-17 ASSESSMENT — FIBROSIS 4 INDEX: FIB4 SCORE: 2.58

## 2022-01-17 NOTE — PROGRESS NOTES
Subjective:     CC: Here for persistent cough and other issues.    HPI:   Jama presents today with the following medical concerns:    Bronchitis  This is an acute problem.  Patient's had a cough for about the last 2 weeks and just not going away.  Occasionally has some phlegm production.  No fever or chills.  Initially had some sinus drainage and congestion with that  but that has since resolved.    Diabetes mellitus with coincident hypertension (HCC)  This is a chronic problem.  Patient blood pressure well controlled.  He got a notice from [facility] that Januvia is preferred over his Tradjenta so would like to change that medication.  New prescription given.      Past Medical History:   Diagnosis Date   • Benign essential hypertension    • Hyperlipoproteinemia    • Hypertension     not on meds anymore   • Pain    • Polycystic kidney 9/10/10    RIGHT KIDNEY TRANSPLANT   • Sleep apnea    • Snoring        Social History     Tobacco Use   • Smoking status: Never Smoker   • Smokeless tobacco: Never Used   Vaping Use   • Vaping Use: Never used   Substance Use Topics   • Alcohol use: No   • Drug use: No       Current Outpatient Medications Ordered in Epic   Medication Sig Dispense Refill   • amoxicillin-clavulanate (AUGMENTIN) 875-125 MG Tab Take 1 Tablet by mouth 2 times a day. 20 Tablet 0   • SITagliptin (JANUVIA) 50 MG Tab Take 1 Tablet by mouth every day. 90 Tablet 3   • glyBURIDE (DIABETA) 5 MG Tab Take 2 Tablets by mouth 2 times a day with meals. 360 Tablet 3   • gabapentin (NEURONTIN) 300 MG Cap TAKE ONE CAPSULE BY MOUTH FOUR TIMES DAILY 360 Capsule 1   • predniSONE (DELTASONE) 5 MG Tab Take 1 Tablet by mouth every day. Take with food 90 Tablet 1   • metoprolol tartrate (LOPRESSOR) 25 MG Tab Take 1 Tablet by mouth every day. 90 Tablet 1   • pioglitazone (ACTOS) 45 MG Tab Take 1 Tablet by mouth every day. 90 Tablet 1   • tadalafil (CIALIS) 5 MG tablet TAKE 1 TABLETS 30 MINUTES BEFORE SEXUAL ACTIVITY, DO NOT  "EXCEED MORE THAN ONE DOSE A DAY 15 Tablet 1   • mycophenolate (CELLCEPT) 250 MG Cap      • omeprazole (PRILOSEC) 20 MG delayed-release capsule      • atorvastatin (LIPITOR) 80 MG tablet Take 1 tablet by mouth at bedtime. 90 tablet 1   • cholecalciferol (VITAMIN D3) 5000 UNIT Cap Take 5,000 Units by mouth every 48 hours.     • tacrolimus (PROGRAF) 1 MG CAPS Take 1 mg by mouth 2 times a day.       No current [facility]-ordered facility-administered medications on file.       Allergies:  Doxycycline    Health Maintenance: Completed    ROS:  Gen: no fevers/chills, no changes in weight  ENT: no sore throat, no hearing loss, no bloody nose  Pulm: no sob,  CV: no chest pain, no palpitations  GI: no nausea/vomiting, no diarrhea      Objective:       Exam:  /76 (BP Location: Right arm, Patient Position: Sitting, BP Cuff Size: Adult)   Pulse 98   Temp 36.8 °C (98.3 °F) (Temporal)   Resp 14   Ht 1.956 m (6' 5\")   Wt 111 kg (244 lb)   SpO2 96%   BMI 28.93 kg/m²  Body mass index is 28.93 kg/m².    Gen: Alert and oriented, No apparent distress.  ENT:    Ear canals and TMs are normal.  Throat is clear.  Nose is clear.  Neck: Neck is supple without lymphadenopathy.  Lungs: Normal effort, CTA bilaterally, no wheezes, rhonchi, or rales  CV: Regular rate and rhythm. No murmurs, rubs, or gallops.  Ext: No clubbing, cyanosis, edema.        Assessment & Plan:     66 y.o. male with the following -     1. Bronchitis  This is an acute problem.  Given that he had a symptoms more than a week and he has some significant underlying illnesses we will go ahead and treat him with Augmentin today.    2. Diabetes mellitus with coincident hypertension (HCC)  These are both chronic conditions under good control.  We will change his Tradjenta to Januvia at 50 mg a day.  That is the max dose given his GFR.  We will recheck his labs in a couple months on the new change.      Return if symptoms worsen or fail to improve.    Please note that this " dictation was created using voice recognition software. I have made every reasonable attempt to correct obvious errors, but I expect that there are errors of grammar and possibly content that I did not discover before finalizing the note.

## 2022-01-17 NOTE — ASSESSMENT & PLAN NOTE
This is a chronic problem.  Patient blood pressure well controlled.  [facility] that Januvia is preferred over his Tradjenta so would like to change that medication.  New prescription given.

## 2022-01-17 NOTE — ASSESSMENT & PLAN NOTE
This is an acute problem.  Patient's had a cough for about the last 2 weeks and just not going away.  Occasionally has some phlegm production.  No fever or chills.  Initially had some sinus drainage and congestion with that  but that has since resolved.

## 2022-01-20 RX ORDER — OMEPRAZOLE 20 MG/1
CAPSULE, DELAYED RELEASE ORAL
Qty: 90 CAPSULE | Refills: 3 | Status: ON HOLD
Start: 2022-01-20 | End: 2022-06-22

## 2022-02-22 RX ORDER — TADALAFIL 5 MG/1
TABLET ORAL
Qty: 15 TABLET | Refills: 3 | Status: ON HOLD
Start: 2022-02-22 | End: 2022-06-22

## 2022-02-24 RX ORDER — ATORVASTATIN CALCIUM 80 MG/1
80 TABLET, FILM COATED ORAL
Qty: 90 TABLET | Refills: 3 | Status: SHIPPED | OUTPATIENT
Start: 2022-02-24 | End: 2022-11-14 | Stop reason: SDUPTHER

## 2022-03-23 ENCOUNTER — HOSPITAL ENCOUNTER (OUTPATIENT)
Dept: LAB | Facility: MEDICAL CENTER | Age: 67
End: 2022-03-23
Attending: INTERNAL MEDICINE
Payer: MEDICARE

## 2022-03-23 LAB
25(OH)D3 SERPL-MCNC: 37 NG/ML (ref 30–100)
ALBUMIN SERPL BCP-MCNC: 4.2 G/DL (ref 3.2–4.9)
ALBUMIN/GLOB SERPL: 3 G/DL
ALP SERPL-CCNC: 84 U/L (ref 30–99)
ALT SERPL-CCNC: 29 U/L (ref 2–50)
ANION GAP SERPL CALC-SCNC: 11 MMOL/L (ref 7–16)
APPEARANCE UR: CLEAR
AST SERPL-CCNC: 22 U/L (ref 12–45)
BASOPHILS # BLD AUTO: 0.5 % (ref 0–1.8)
BASOPHILS # BLD: 0.02 K/UL (ref 0–0.12)
BILIRUB SERPL-MCNC: 0.5 MG/DL (ref 0.1–1.5)
BILIRUB UR QL STRIP.AUTO: NEGATIVE
BUN SERPL-MCNC: 33 MG/DL (ref 8–22)
CALCIUM SERPL-MCNC: 9.2 MG/DL (ref 8.5–10.5)
CHLORIDE SERPL-SCNC: 105 MMOL/L (ref 96–112)
CO2 SERPL-SCNC: 24 MMOL/L (ref 20–33)
COLOR UR: YELLOW
CREAT SERPL-MCNC: 1.65 MG/DL (ref 0.5–1.4)
CREAT UR-MCNC: 52.97 MG/DL
CREAT UR-MCNC: 53.13 MG/DL
EOSINOPHIL # BLD AUTO: 0.04 K/UL (ref 0–0.51)
EOSINOPHIL NFR BLD: 0.9 % (ref 0–6.9)
ERYTHROCYTE [DISTWIDTH] IN BLOOD BY AUTOMATED COUNT: 52.4 FL (ref 35.9–50)
EST. AVERAGE GLUCOSE BLD GHB EST-MCNC: 246 MG/DL
GFR SERPLBLD CREATININE-BSD FMLA CKD-EPI: 46 ML/MIN/1.73 M 2
GLOBULIN SER CALC-MCNC: 1.4 G/DL (ref 1.9–3.5)
GLUCOSE SERPL-MCNC: 255 MG/DL (ref 65–99)
GLUCOSE UR STRIP.AUTO-MCNC: >=1000 MG/DL
HBA1C MFR BLD: 10.2 % (ref 4–5.6)
HCT VFR BLD AUTO: 43.9 % (ref 42–52)
HGB BLD-MCNC: 13.4 G/DL (ref 14–18)
IMM GRANULOCYTES # BLD AUTO: 0.07 K/UL (ref 0–0.11)
IMM GRANULOCYTES NFR BLD AUTO: 1.6 % (ref 0–0.9)
KETONES UR STRIP.AUTO-MCNC: NEGATIVE MG/DL
LEUKOCYTE ESTERASE UR QL STRIP.AUTO: NEGATIVE
LYMPHOCYTES # BLD AUTO: 0.63 K/UL (ref 1–4.8)
LYMPHOCYTES NFR BLD: 14.3 % (ref 22–41)
MAGNESIUM SERPL-MCNC: 1.9 MG/DL (ref 1.5–2.5)
MCH RBC QN AUTO: 28.9 PG (ref 27–33)
MCHC RBC AUTO-ENTMCNC: 30.5 G/DL (ref 33.7–35.3)
MCV RBC AUTO: 94.8 FL (ref 81.4–97.8)
MICRO URNS: ABNORMAL
MICROALBUMIN UR-MCNC: <1.2 MG/DL
MICROALBUMIN/CREAT UR: NORMAL MG/G (ref 0–30)
MONOCYTES # BLD AUTO: 0.53 K/UL (ref 0–0.85)
MONOCYTES NFR BLD AUTO: 12 % (ref 0–13.4)
NEUTROPHILS # BLD AUTO: 3.13 K/UL (ref 1.82–7.42)
NEUTROPHILS NFR BLD: 70.7 % (ref 44–72)
NITRITE UR QL STRIP.AUTO: NEGATIVE
NRBC # BLD AUTO: 0 K/UL
NRBC BLD-RTO: 0 /100 WBC
PH UR STRIP.AUTO: 6 [PH] (ref 5–8)
PHOSPHATE SERPL-MCNC: 2.5 MG/DL (ref 2.5–4.5)
PLATELET # BLD AUTO: 99 K/UL (ref 164–446)
PMV BLD AUTO: 11.9 FL (ref 9–12.9)
POTASSIUM SERPL-SCNC: 4.7 MMOL/L (ref 3.6–5.5)
PROT SERPL-MCNC: 5.6 G/DL (ref 6–8.2)
PROT UR QL STRIP: NEGATIVE MG/DL
PROT UR-MCNC: 5 MG/DL (ref 0–15)
PROT/CREAT UR: 94 MG/G (ref 15–68)
PTH-INTACT SERPL-MCNC: 36.1 PG/ML (ref 14–72)
RBC # BLD AUTO: 4.63 M/UL (ref 4.7–6.1)
RBC UR QL AUTO: NEGATIVE
SODIUM SERPL-SCNC: 140 MMOL/L (ref 135–145)
SP GR UR STRIP.AUTO: 1.02
URATE SERPL-MCNC: 6.1 MG/DL (ref 2.5–8.3)
UROBILINOGEN UR STRIP.AUTO-MCNC: 0.2 MG/DL
WBC # BLD AUTO: 4.4 K/UL (ref 4.8–10.8)

## 2022-03-23 PROCEDURE — 80197 ASSAY OF TACROLIMUS: CPT

## 2022-03-23 PROCEDURE — 84100 ASSAY OF PHOSPHORUS: CPT

## 2022-03-23 PROCEDURE — 82306 VITAMIN D 25 HYDROXY: CPT

## 2022-03-23 PROCEDURE — 83970 ASSAY OF PARATHORMONE: CPT

## 2022-03-23 PROCEDURE — 83036 HEMOGLOBIN GLYCOSYLATED A1C: CPT

## 2022-03-23 PROCEDURE — 81003 URINALYSIS AUTO W/O SCOPE: CPT

## 2022-03-23 PROCEDURE — 82570 ASSAY OF URINE CREATININE: CPT

## 2022-03-23 PROCEDURE — 84156 ASSAY OF PROTEIN URINE: CPT

## 2022-03-23 PROCEDURE — 82043 UR ALBUMIN QUANTITATIVE: CPT

## 2022-03-23 PROCEDURE — 83735 ASSAY OF MAGNESIUM: CPT

## 2022-03-23 PROCEDURE — 36415 COLL VENOUS BLD VENIPUNCTURE: CPT

## 2022-03-23 PROCEDURE — 80053 COMPREHEN METABOLIC PANEL: CPT

## 2022-03-23 PROCEDURE — 84550 ASSAY OF BLOOD/URIC ACID: CPT

## 2022-03-23 PROCEDURE — 85025 COMPLETE CBC W/AUTO DIFF WBC: CPT

## 2022-03-25 LAB — TACROLIMUS BLD-MCNC: 5.4 NG/ML

## 2022-04-05 ENCOUNTER — OFFICE VISIT (OUTPATIENT)
Dept: MEDICAL GROUP | Facility: PHYSICIAN GROUP | Age: 67
End: 2022-04-05
Payer: MEDICARE

## 2022-04-05 VITALS
HEART RATE: 62 BPM | DIASTOLIC BLOOD PRESSURE: 70 MMHG | OXYGEN SATURATION: 98 % | SYSTOLIC BLOOD PRESSURE: 124 MMHG | RESPIRATION RATE: 16 BRPM | TEMPERATURE: 97.7 F | BODY MASS INDEX: 28.24 KG/M2 | WEIGHT: 239.2 LBS | HEIGHT: 77 IN

## 2022-04-05 DIAGNOSIS — E11.29 TYPE 2 DIABETES MELLITUS WITH OTHER DIABETIC KIDNEY COMPLICATION, WITHOUT LONG-TERM CURRENT USE OF INSULIN (HCC): ICD-10-CM

## 2022-04-05 DIAGNOSIS — N18.32 STAGE 3B CHRONIC KIDNEY DISEASE: ICD-10-CM

## 2022-04-05 DIAGNOSIS — I48.0 PAF (PAROXYSMAL ATRIAL FIBRILLATION) (HCC): ICD-10-CM

## 2022-04-05 DIAGNOSIS — D69.6 THROMBOCYTOPENIA (HCC): ICD-10-CM

## 2022-04-05 PROBLEM — Z76.89 ENCOUNTER TO ESTABLISH CARE: Status: RESOLVED | Noted: 2021-12-16 | Resolved: 2022-04-05

## 2022-04-05 PROCEDURE — 99213 OFFICE O/P EST LOW 20 MIN: CPT | Performed by: FAMILY MEDICINE

## 2022-04-05 ASSESSMENT — FIBROSIS 4 INDEX: FIB4 SCORE: 2.68

## 2022-04-05 NOTE — PROGRESS NOTES
Subjective:     CC: Here to go over recent lab test by his nephrologist show an elevated hemoglobin A1c.    HPI:   Jama presents today with following medical concern:    Type 2 diabetes mellitus with kidney complication, without long-term current use of insulin (HCC)  This is a chronic problem.  Patient recently had steroid injection in his hip and subsequent hyperglycemia as a result.  His diabetic doctor checked his hemoglobin A1c and had increased to 10.2 his previous level had been 6.4.  Patient states he was also having some thirst and polyuria but that has since subsided.  He wants to wait and see if the levels come back down as the steroid wears out of his body.  He does have an eye doctor and will see him soon for his annual exam.    Thrombocytopenia (HCC)  This is a chronic problem.  Levels are stable on recent lab testing.    Stage 3b chronic kidney disease (HCC)  This is a chronic problem.  He is followed by nephrology.  GFR and creatinine appear stable.    PAF (paroxysmal atrial fibrillation) (HCC)  This is a chronic problem.  Rate is controlled on a beta-blocker.      Past Medical History:   Diagnosis Date   • Benign essential hypertension    • Hyperlipoproteinemia    • Hypertension     not on meds anymore   • Pain    • Polycystic kidney 9/10/10    RIGHT KIDNEY TRANSPLANT   • Sleep apnea    • Snoring        Social History     Tobacco Use   • Smoking status: Never Smoker   • Smokeless tobacco: Never Used   Vaping Use   • Vaping Use: Never used   Substance Use Topics   • Alcohol use: No   • Drug use: No       Current Outpatient Medications Ordered in Epic   Medication Sig Dispense Refill   • atorvastatin (LIPITOR) 80 MG tablet Take 1 Tablet by mouth at bedtime. 90 Tablet 3   • tadalafil (CIALIS) 5 MG tablet : TAKE 1 TABLETS 30 MINUTES BEFORE SEXUAL ACTIVITY, DO NOT EXCEED MORE THAN ONE DOSE A DAY 15 Tablet 3   • TRADJENTA 5 MG Tab tablet      • omeprazole (PRILOSEC) 20 MG delayed-release capsule TAKE ONE  "CAPSULE BY MOUTH EVERY DAY 90 Capsule 3   • SITagliptin (JANUVIA) 50 MG Tab Take 1 Tablet by mouth every day. 90 Tablet 3   • glyBURIDE (DIABETA) 5 MG Tab Take 2 Tablets by mouth 2 times a day with meals. 360 Tablet 3   • gabapentin (NEURONTIN) 300 MG Cap TAKE ONE CAPSULE BY MOUTH FOUR TIMES DAILY 360 Capsule 1   • predniSONE (DELTASONE) 5 MG Tab Take 1 Tablet by mouth every day. Take with food 90 Tablet 1   • metoprolol tartrate (LOPRESSOR) 25 MG Tab Take 1 Tablet by mouth every day. 90 Tablet 1   • pioglitazone (ACTOS) 45 MG Tab Take 1 Tablet by mouth every day. 90 Tablet 1   • mycophenolate (CELLCEPT) 250 MG Cap      • cholecalciferol (VITAMIN D3) 5000 UNIT Cap Take 5,000 Units by mouth every 48 hours.     • tacrolimus (PROGRAF) 1 MG CAPS Take 1 mg by mouth 2 times a day.       No current [facility]-ordered facility-administered medications on file.       Allergies:  Doxycycline    Health Maintenance: Completed    ROS:  Gen: no fevers/chills, no changes in weight  Eyes: no changes in vision  ENT: no sore throat, no hearing loss, no bloody nose  Pulm: no sob, no cough  CV: no chest pain, no palpitations  GI: no nausea/vomiting, no diarrhea  : no dysuria  MSk: no myalgias  Skin: no rash  Neuro: no headaches, no numbness/tingling  Heme/Lymph: no easy bruising      Objective:       Exam:  /70 (BP Location: Right arm, Patient Position: Sitting, BP Cuff Size: Adult)   Pulse 62   Temp 36.5 °C (97.7 °F) (Temporal)   Resp 16   Ht 1.956 m (6' 5\")   Wt 109 kg (239 lb 3.2 oz)   SpO2 98%   BMI 28.36 kg/m²  Body mass index is 28.36 kg/m².    Gen: Alert and oriented, No apparent distress.  Ext: No clubbing, cyanosis, edema.    Labs: Lab results reviewed with patient.    Assessment & Plan:     66 y.o. male with the following -     1. Type 2 diabetes mellitus with other diabetic kidney complication, without long-term current use of insulin (HCC)  This is a chronic problem.  We discussed that steroids can indeed increase " the blood sugar for a time..  He wants to wait and see what his next hemoglobin A1c is he is feeling like he is back to normal.  He is good to have lab done again with his nephrologist around August.  [facility]  his neuropathy.  He will keep us in touch on how that does.  He says they are using stem cell injection as well as other treatments.    2. Thrombocytopenia (HCC)  This is a chronic problem.  Appears stable.  Continue to follow.    3. Stage 3b chronic kidney disease (HCC)  This is a chronic problem.  Continue follow-up with nephrology.    4. PAF (paroxysmal atrial fibrillation) ([facility])  This is a chronic problem.  Continue to follow.      Return in about 6 months (around 10/5/2022) for wellness.    Please note that this dictation was created using voice recognition software. I have made every reasonable attempt to correct obvious errors, but I expect that there are errors of grammar and possibly content that I did not discover before finalizing the note.

## 2022-04-05 NOTE — ASSESSMENT & PLAN NOTE
This is a chronic problem.  Patient recently had steroid injection in his hip and subsequent hyperglycemia as a result.  His diabetic doctor checked his hemoglobin A1c and had increased to 10.2 his previous level had been 6.4.  Patient states he was also having some thirst and polyuria but that has since subsided.  He wants to wait and see if the levels come back down as the steroid wears out of his body.  He does have an eye doctor and will see him soon for his annual exam.

## 2022-04-21 RX ORDER — LINAGLIPTIN 5 MG/1
5 TABLET, FILM COATED ORAL DAILY
Qty: 90 TABLET | Refills: 3 | OUTPATIENT
Start: 2022-04-21

## 2022-04-28 RX ORDER — LINAGLIPTIN 5 MG/1
TABLET, FILM COATED ORAL DAILY
Qty: 30 TABLET | OUTPATIENT
Start: 2022-04-28

## 2022-05-10 RX ORDER — LINAGLIPTIN 5 MG/1
TABLET, FILM COATED ORAL
Qty: 30 TABLET | OUTPATIENT
Start: 2022-05-10

## 2022-05-20 ENCOUNTER — TELEPHONE (OUTPATIENT)
Dept: MEDICAL GROUP | Facility: PHYSICIAN GROUP | Age: 67
End: 2022-05-20
Payer: MEDICARE

## 2022-05-20 NOTE — TELEPHONE ENCOUNTER
DOCUMENTATION OF PAR STATUS:    1. Name of Medication & Dose: Januvia     2. Name of Prescription Coverage Company & phone #: [employer]    3. Date Prior Auth Submitted: 05/20/2022    4. What information was given to obtain insurance decision? Cover My Meds    5. Prior Auth Status? Pending    6. Patient Notified: yes

## 2022-05-23 NOTE — TELEPHONE ENCOUNTER
FINAL PRIOR AUTHORIZATION STATUS:    1.  Name of Medication & Dose: Januvia     2. Prior Auth Status: Approved through 05/21/23     3. Action Taken: Pharmacy Notified: yes Patient Notified: yes

## 2022-05-24 ENCOUNTER — APPOINTMENT (OUTPATIENT)
Dept: RADIOLOGY | Facility: MEDICAL CENTER | Age: 67
DRG: 870 | End: 2022-05-24
Attending: EMERGENCY MEDICINE
Payer: MEDICARE

## 2022-05-24 ENCOUNTER — APPOINTMENT (OUTPATIENT)
Dept: RADIOLOGY | Facility: MEDICAL CENTER | Age: 67
DRG: 870 | End: 2022-05-24
Payer: MEDICARE

## 2022-05-24 ENCOUNTER — HOSPITAL ENCOUNTER (INPATIENT)
Facility: MEDICAL CENTER | Age: 67
LOS: 29 days | DRG: 870 | End: 2022-06-22
Attending: EMERGENCY MEDICINE | Admitting: SURGERY
Payer: MEDICARE

## 2022-05-24 ENCOUNTER — APPOINTMENT (OUTPATIENT)
Dept: RADIOLOGY | Facility: MEDICAL CENTER | Age: 67
DRG: 870 | End: 2022-05-24
Attending: INTERNAL MEDICINE
Payer: MEDICARE

## 2022-05-24 DIAGNOSIS — E11.69 TYPE 2 DIABETES MELLITUS WITH HYPERLIPIDEMIA (HCC): ICD-10-CM

## 2022-05-24 DIAGNOSIS — E78.5 TYPE 2 DIABETES MELLITUS WITH HYPERLIPIDEMIA (HCC): ICD-10-CM

## 2022-05-24 DIAGNOSIS — I46.9 CARDIAC ARREST (HCC): ICD-10-CM

## 2022-05-24 DIAGNOSIS — N18.32 STAGE 3B CHRONIC KIDNEY DISEASE: ICD-10-CM

## 2022-05-24 DIAGNOSIS — L03.115 CELLULITIS OF RIGHT LOWER EXTREMITY: ICD-10-CM

## 2022-05-24 DIAGNOSIS — D84.821 IMMUNOSUPPRESSION DUE TO CHRONIC STEROID USE (HCC): ICD-10-CM

## 2022-05-24 DIAGNOSIS — R53.81 DEBILITY: ICD-10-CM

## 2022-05-24 DIAGNOSIS — R78.81 BACTEREMIA DUE TO ESCHERICHIA COLI: ICD-10-CM

## 2022-05-24 DIAGNOSIS — A41.9 SEPTIC SHOCK (HCC): ICD-10-CM

## 2022-05-24 DIAGNOSIS — Z86.79 HISTORY OF ATRIAL FIBRILLATION: ICD-10-CM

## 2022-05-24 DIAGNOSIS — Z79.52 IMMUNOSUPPRESSION DUE TO CHRONIC STEROID USE (HCC): ICD-10-CM

## 2022-05-24 DIAGNOSIS — I95.9 HYPOTENSION, UNSPECIFIED HYPOTENSION TYPE: ICD-10-CM

## 2022-05-24 DIAGNOSIS — T38.0X5A IMMUNOSUPPRESSION DUE TO CHRONIC STEROID USE (HCC): ICD-10-CM

## 2022-05-24 DIAGNOSIS — J96.01 ACUTE RESPIRATORY FAILURE WITH HYPOXIA (HCC): ICD-10-CM

## 2022-05-24 DIAGNOSIS — I48.0 PAROXYSMAL ATRIAL FIBRILLATION (HCC): ICD-10-CM

## 2022-05-24 DIAGNOSIS — B96.20 BACTEREMIA DUE TO ESCHERICHIA COLI: ICD-10-CM

## 2022-05-24 DIAGNOSIS — R65.21 SEPTIC SHOCK (HCC): ICD-10-CM

## 2022-05-24 LAB
ABO + RH BLD: NORMAL
ABO GROUP BLD: NORMAL
ALBUMIN SERPL BCP-MCNC: 3.7 G/DL (ref 3.2–4.9)
ALBUMIN/GLOB SERPL: 2.2 G/DL
ALP SERPL-CCNC: 75 U/L (ref 30–99)
ALT SERPL-CCNC: 22 U/L (ref 2–50)
ANION GAP SERPL CALC-SCNC: 16 MMOL/L (ref 7–16)
ANION GAP SERPL CALC-SCNC: 21 MMOL/L (ref 7–16)
APPEARANCE UR: CLEAR
APTT PPP: 30.7 SEC (ref 24.7–36)
AST SERPL-CCNC: 24 U/L (ref 12–45)
BASOPHILS # BLD AUTO: 0 % (ref 0–1.8)
BASOPHILS # BLD: 0 K/UL (ref 0–0.12)
BILIRUB SERPL-MCNC: 0.9 MG/DL (ref 0.1–1.5)
BILIRUB UR QL STRIP.AUTO: NEGATIVE
BLD GP AB SCN SERPL QL: NORMAL
BUN SERPL-MCNC: 33 MG/DL (ref 8–22)
BUN SERPL-MCNC: 37 MG/DL (ref 8–22)
BURR CELLS BLD QL SMEAR: NORMAL
BURR CELLS/RBC NFR CSF MANUAL: 0 %
C GATTII+NEOFOR DNA CSF QL NAA+NON-PROBE: NOT DETECTED
CALCIUM SERPL-MCNC: 7.9 MG/DL (ref 8.5–10.5)
CALCIUM SERPL-MCNC: 8.9 MG/DL (ref 8.5–10.5)
CHLORIDE SERPL-SCNC: 106 MMOL/L (ref 96–112)
CHLORIDE SERPL-SCNC: 106 MMOL/L (ref 96–112)
CK SERPL-CCNC: 684 U/L (ref 0–154)
CLARITY CSF: NORMAL
CMV DNA CSF QL NAA+NON-PROBE: NOT DETECTED
CO2 SERPL-SCNC: 15 MMOL/L (ref 20–33)
CO2 SERPL-SCNC: 17 MMOL/L (ref 20–33)
COLOR CSF: NORMAL
COLOR SPUN CSF: COLORLESS
COLOR UR: YELLOW
CREAT SERPL-MCNC: 2.77 MG/DL (ref 0.5–1.4)
CREAT SERPL-MCNC: 3.63 MG/DL (ref 0.5–1.4)
CRP SERPL HS-MCNC: 17.22 MG/DL (ref 0–0.75)
CRP SERPL HS-MCNC: 9.99 MG/DL (ref 0–0.75)
E COLI K1 DNA CSF QL NAA+NON-PROBE: NOT DETECTED
EKG IMPRESSION: NORMAL
EOSINOPHIL # BLD AUTO: 0 K/UL (ref 0–0.51)
EOSINOPHIL NFR BLD: 0 % (ref 0–6.9)
ERYTHROCYTE [DISTWIDTH] IN BLOOD BY AUTOMATED COUNT: 50 FL (ref 35.9–50)
ERYTHROCYTE [DISTWIDTH] IN BLOOD BY AUTOMATED COUNT: 50.5 FL (ref 35.9–50)
EST. AVERAGE GLUCOSE BLD GHB EST-MCNC: 260 MG/DL
ETHANOL BLD-MCNC: <10.1 MG/DL
EV RNA CSF QL NAA+NON-PROBE: NOT DETECTED
FLUAV RNA SPEC QL NAA+PROBE: NEGATIVE
FLUBV RNA SPEC QL NAA+PROBE: NEGATIVE
GFR SERPLBLD CREATININE-BSD FMLA CKD-EPI: 18 ML/MIN/1.73 M 2
GFR SERPLBLD CREATININE-BSD FMLA CKD-EPI: 25 ML/MIN/1.73 M 2
GLOBULIN SER CALC-MCNC: 1.7 G/DL (ref 1.9–3.5)
GLUCOSE CSF-MCNC: 111 MG/DL (ref 40–80)
GLUCOSE SERPL-MCNC: 192 MG/DL (ref 65–99)
GLUCOSE SERPL-MCNC: 230 MG/DL (ref 65–99)
GLUCOSE UR STRIP.AUTO-MCNC: 500 MG/DL
GP B STREP DNA CSF QL NAA+NON-PROBE: NOT DETECTED
GRAM STN SPEC: NORMAL
HAEM INFLU DNA CSF QL NAA+NON-PROBE: NOT DETECTED
HBA1C MFR BLD: 10.7 % (ref 4–5.6)
HCT VFR BLD AUTO: 40.7 % (ref 42–52)
HCT VFR BLD AUTO: 43.9 % (ref 42–52)
HGB BLD-MCNC: 12.8 G/DL (ref 14–18)
HGB BLD-MCNC: 13.7 G/DL (ref 14–18)
HHV6 DNA CSF QL NAA+NON-PROBE: NOT DETECTED
HSV1 DNA CSF QL NAA+NON-PROBE: NOT DETECTED
HSV2 DNA CSF QL NAA+NON-PROBE: NOT DETECTED
INR PPP: 1.16 (ref 0.87–1.13)
KETONES UR STRIP.AUTO-MCNC: NEGATIVE MG/DL
L MONOCYTOG DNA CSF QL NAA+NON-PROBE: NOT DETECTED
LACTATE BLD-SCNC: 3.4 MMOL/L (ref 0.5–2)
LACTATE BLD-SCNC: 6.4 MMOL/L (ref 0.5–2)
LACTATE BLD-SCNC: 9.8 MMOL/L (ref 0.5–2)
LEUKOCYTE ESTERASE UR QL STRIP.AUTO: NEGATIVE
LYMPHOCYTES # BLD AUTO: 0.21 K/UL (ref 1–4.8)
LYMPHOCYTES NFR BLD: 1.7 % (ref 22–41)
LYMPHOCYTES NFR CSF: 34 %
MANUAL DIFF BLD: NORMAL
MCH RBC QN AUTO: 29.3 PG (ref 27–33)
MCH RBC QN AUTO: 29.7 PG (ref 27–33)
MCHC RBC AUTO-ENTMCNC: 31.2 G/DL (ref 33.7–35.3)
MCHC RBC AUTO-ENTMCNC: 31.4 G/DL (ref 33.7–35.3)
MCV RBC AUTO: 94 FL (ref 81.4–97.8)
MCV RBC AUTO: 94.4 FL (ref 81.4–97.8)
METAMYELOCYTES NFR BLD MANUAL: 4.3 %
MICRO URNS: ABNORMAL
MONOCYTES # BLD AUTO: 0.11 K/UL (ref 0–0.85)
MONOCYTES NFR BLD AUTO: 0.9 % (ref 0–13.4)
MONONUC CELLS NFR CSF: 6 %
MORPHOLOGY BLD-IMP: NORMAL
N MEN DNA CSF QL NAA+NON-PROBE: NOT DETECTED
NEUTROPHILS # BLD AUTO: 11.54 K/UL (ref 1.82–7.42)
NEUTROPHILS NFR BLD: 87 % (ref 44–72)
NEUTROPHILS NFR CSF: 18 %
NEUTS BAND NFR BLD MANUAL: 6.1 % (ref 0–10)
NITRITE UR QL STRIP.AUTO: NEGATIVE
NRBC # BLD AUTO: 0 K/UL
NRBC BLD-RTO: 0 /100 WBC
OVALOCYTES BLD QL SMEAR: NORMAL
PARECHOVIRUS A RNA CSF QL NAA+NON-PROBE: NOT DETECTED
PH UR STRIP.AUTO: 5 [PH] (ref 5–8)
PLATELET # BLD AUTO: 112 K/UL (ref 164–446)
PLATELET # BLD AUTO: 119 K/UL (ref 164–446)
PLATELET BLD QL SMEAR: NORMAL
PMV BLD AUTO: 11.3 FL (ref 9–12.9)
PMV BLD AUTO: 11.4 FL (ref 9–12.9)
POIKILOCYTOSIS BLD QL SMEAR: NORMAL
POTASSIUM SERPL-SCNC: 4 MMOL/L (ref 3.6–5.5)
POTASSIUM SERPL-SCNC: 4.6 MMOL/L (ref 3.6–5.5)
PROCALCITONIN SERPL-MCNC: 33.16 NG/ML
PROT CSF-MCNC: 78 MG/DL (ref 15–45)
PROT SERPL-MCNC: 5.4 G/DL (ref 6–8.2)
PROT UR QL STRIP: NEGATIVE MG/DL
PROTHROMBIN TIME: 14.5 SEC (ref 12–14.6)
RBC # BLD AUTO: 4.31 M/UL (ref 4.7–6.1)
RBC # BLD AUTO: 4.67 M/UL (ref 4.7–6.1)
RBC # CSF: 3000 CELLS/UL
RBC BLD AUTO: PRESENT
RBC UR QL AUTO: NEGATIVE
RH BLD: NORMAL
RSV RNA SPEC QL NAA+PROBE: NEGATIVE
S PNEUM DNA CSF QL NAA+NON-PROBE: NOT DETECTED
SARS-COV-2 RNA RESP QL NAA+PROBE: NOTDETECTED
SIGNIFICANT IND 70042: NORMAL
SITE SITE: NORMAL
SODIUM SERPL-SCNC: 139 MMOL/L (ref 135–145)
SODIUM SERPL-SCNC: 142 MMOL/L (ref 135–145)
SOURCE SOURCE: NORMAL
SP GR UR STRIP.AUTO: 1.02
SPECIMEN SOURCE: NORMAL
SPECIMEN VOL CSF: 2 ML
TUBE # CSF: 3
TUBE # CSF: 4
UROBILINOGEN UR STRIP.AUTO-MCNC: 0.2 MG/DL
VZV DNA CSF QL NAA+NON-PROBE: NOT DETECTED
WBC # BLD AUTO: 12.4 K/UL (ref 4.8–10.8)
WBC # BLD AUTO: 8.6 K/UL (ref 4.8–10.8)
WBC # CSF: 2 CELLS/UL (ref 0–10)

## 2022-05-24 PROCEDURE — 85027 COMPLETE CBC AUTOMATED: CPT

## 2022-05-24 PROCEDURE — 700105 HCHG RX REV CODE 258: Performed by: INTERNAL MEDICINE

## 2022-05-24 PROCEDURE — 96366 THER/PROPH/DIAG IV INF ADDON: CPT

## 2022-05-24 PROCEDURE — 82962 GLUCOSE BLOOD TEST: CPT

## 2022-05-24 PROCEDURE — 73590 X-RAY EXAM OF LOWER LEG: CPT | Mod: RT

## 2022-05-24 PROCEDURE — 87483 CNS DNA AMP PROBE TYPE 12-25: CPT

## 2022-05-24 PROCEDURE — 86850 RBC ANTIBODY SCREEN: CPT

## 2022-05-24 PROCEDURE — 85025 COMPLETE CBC W/AUTO DIFF WBC: CPT

## 2022-05-24 PROCEDURE — 84157 ASSAY OF PROTEIN OTHER: CPT

## 2022-05-24 PROCEDURE — 93005 ELECTROCARDIOGRAM TRACING: CPT | Performed by: EMERGENCY MEDICINE

## 2022-05-24 PROCEDURE — C9803 HOPD COVID-19 SPEC COLLECT: HCPCS | Performed by: EMERGENCY MEDICINE

## 2022-05-24 PROCEDURE — 700101 HCHG RX REV CODE 250: Performed by: STUDENT IN AN ORGANIZED HEALTH CARE EDUCATION/TRAINING PROGRAM

## 2022-05-24 PROCEDURE — 87040 BLOOD CULTURE FOR BACTERIA: CPT

## 2022-05-24 PROCEDURE — 72170 X-RAY EXAM OF PELVIS: CPT

## 2022-05-24 PROCEDURE — 86140 C-REACTIVE PROTEIN: CPT

## 2022-05-24 PROCEDURE — 85007 BL SMEAR W/DIFF WBC COUNT: CPT

## 2022-05-24 PROCEDURE — 71275 CT ANGIOGRAPHY CHEST: CPT | Mod: MG

## 2022-05-24 PROCEDURE — 02HV33Z INSERTION OF INFUSION DEVICE INTO SUPERIOR VENA CAVA, PERCUTANEOUS APPROACH: ICD-10-PCS | Performed by: EMERGENCY MEDICINE

## 2022-05-24 PROCEDURE — 700117 HCHG RX CONTRAST REV CODE 255: Performed by: EMERGENCY MEDICINE

## 2022-05-24 PROCEDURE — 86900 BLOOD TYPING SEROLOGIC ABO: CPT

## 2022-05-24 PROCEDURE — 96365 THER/PROPH/DIAG IV INF INIT: CPT

## 2022-05-24 PROCEDURE — 70450 CT HEAD/BRAIN W/O DYE: CPT

## 2022-05-24 PROCEDURE — 73700 CT LOWER EXTREMITY W/O DYE: CPT | Mod: RT,MF

## 2022-05-24 PROCEDURE — 99222 1ST HOSP IP/OBS MODERATE 55: CPT | Performed by: ORTHOPAEDIC SURGERY

## 2022-05-24 PROCEDURE — 83036 HEMOGLOBIN GLYCOSYLATED A1C: CPT

## 2022-05-24 PROCEDURE — 62270 DX LMBR SPI PNXR: CPT

## 2022-05-24 PROCEDURE — 009U3ZX DRAINAGE OF SPINAL CANAL, PERCUTANEOUS APPROACH, DIAGNOSTIC: ICD-10-PCS | Performed by: EMERGENCY MEDICINE

## 2022-05-24 PROCEDURE — 770022 HCHG ROOM/CARE - ICU (200)

## 2022-05-24 PROCEDURE — G0390 TRAUMA RESPONS W/HOSP CRITI: HCPCS

## 2022-05-24 PROCEDURE — 87186 SC STD MICRODIL/AGAR DIL: CPT

## 2022-05-24 PROCEDURE — 700105 HCHG RX REV CODE 258

## 2022-05-24 PROCEDURE — 74177 CT ABD & PELVIS W/CONTRAST: CPT | Mod: MG

## 2022-05-24 PROCEDURE — 87077 CULTURE AEROBIC IDENTIFY: CPT

## 2022-05-24 PROCEDURE — 81003 URINALYSIS AUTO W/O SCOPE: CPT

## 2022-05-24 PROCEDURE — 71045 X-RAY EXAM CHEST 1 VIEW: CPT

## 2022-05-24 PROCEDURE — 700101 HCHG RX REV CODE 250: Performed by: EMERGENCY MEDICINE

## 2022-05-24 PROCEDURE — 93005 ELECTROCARDIOGRAM TRACING: CPT

## 2022-05-24 PROCEDURE — 700101 HCHG RX REV CODE 250

## 2022-05-24 PROCEDURE — 76705 ECHO EXAM OF ABDOMEN: CPT

## 2022-05-24 PROCEDURE — C1751 CATH, INF, PER/CENT/MIDLINE: HCPCS | Performed by: EMERGENCY MEDICINE

## 2022-05-24 PROCEDURE — 84145 PROCALCITONIN (PCT): CPT

## 2022-05-24 PROCEDURE — 85730 THROMBOPLASTIN TIME PARTIAL: CPT

## 2022-05-24 PROCEDURE — 36415 COLL VENOUS BLD VENIPUNCTURE: CPT

## 2022-05-24 PROCEDURE — 700102 HCHG RX REV CODE 250 W/ 637 OVERRIDE(OP): Performed by: INTERNAL MEDICINE

## 2022-05-24 PROCEDURE — 96368 THER/DIAG CONCURRENT INF: CPT

## 2022-05-24 PROCEDURE — 700111 HCHG RX REV CODE 636 W/ 250 OVERRIDE (IP): Performed by: INTERNAL MEDICINE

## 2022-05-24 PROCEDURE — 86901 BLOOD TYPING SEROLOGIC RH(D): CPT

## 2022-05-24 PROCEDURE — 80048 BASIC METABOLIC PNL TOTAL CA: CPT

## 2022-05-24 PROCEDURE — 83605 ASSAY OF LACTIC ACID: CPT

## 2022-05-24 PROCEDURE — 700101 HCHG RX REV CODE 250: Performed by: INTERNAL MEDICINE

## 2022-05-24 PROCEDURE — 700111 HCHG RX REV CODE 636 W/ 250 OVERRIDE (IP): Performed by: EMERGENCY MEDICINE

## 2022-05-24 PROCEDURE — 700105 HCHG RX REV CODE 258: Performed by: EMERGENCY MEDICINE

## 2022-05-24 PROCEDURE — 51702 INSERT TEMP BLADDER CATH: CPT

## 2022-05-24 PROCEDURE — 96367 TX/PROPH/DG ADDL SEQ IV INF: CPT

## 2022-05-24 PROCEDURE — 72125 CT NECK SPINE W/O DYE: CPT

## 2022-05-24 PROCEDURE — 82077 ASSAY SPEC XCP UR&BREATH IA: CPT

## 2022-05-24 PROCEDURE — 99291 CRITICAL CARE FIRST HOUR: CPT | Performed by: INTERNAL MEDICINE

## 2022-05-24 PROCEDURE — 96375 TX/PRO/DX INJ NEW DRUG ADDON: CPT

## 2022-05-24 PROCEDURE — 85610 PROTHROMBIN TIME: CPT

## 2022-05-24 PROCEDURE — 82945 GLUCOSE OTHER FLUID: CPT

## 2022-05-24 PROCEDURE — 87205 SMEAR GRAM STAIN: CPT

## 2022-05-24 PROCEDURE — 87070 CULTURE OTHR SPECIMN AEROBIC: CPT

## 2022-05-24 PROCEDURE — 99222 1ST HOSP IP/OBS MODERATE 55: CPT | Performed by: SURGERY

## 2022-05-24 PROCEDURE — 303105 HCHG CATHETER EXTRA

## 2022-05-24 PROCEDURE — C1751 CATH, INF, PER/CENT/MIDLINE: HCPCS

## 2022-05-24 PROCEDURE — 0241U HCHG SARS-COV-2 COVID-19 NFCT DS RESP RNA 4 TRGT MIC: CPT

## 2022-05-24 PROCEDURE — 99291 CRITICAL CARE FIRST HOUR: CPT

## 2022-05-24 PROCEDURE — 82550 ASSAY OF CK (CPK): CPT

## 2022-05-24 PROCEDURE — 36556 INSERT NON-TUNNEL CV CATH: CPT

## 2022-05-24 PROCEDURE — 89051 BODY FLUID CELL COUNT: CPT

## 2022-05-24 PROCEDURE — 700111 HCHG RX REV CODE 636 W/ 250 OVERRIDE (IP)

## 2022-05-24 PROCEDURE — 80053 COMPREHEN METABOLIC PANEL: CPT

## 2022-05-24 RX ORDER — SODIUM CHLORIDE, SODIUM LACTATE, POTASSIUM CHLORIDE, AND CALCIUM CHLORIDE .6; .31; .03; .02 G/100ML; G/100ML; G/100ML; G/100ML
500 INJECTION, SOLUTION INTRAVENOUS ONCE
Status: COMPLETED | OUTPATIENT
Start: 2022-05-25 | End: 2022-05-25

## 2022-05-24 RX ORDER — CLINDAMYCIN PHOSPHATE 900 MG/50ML
900 INJECTION, SOLUTION INTRAVENOUS EVERY 8 HOURS
Status: DISCONTINUED | OUTPATIENT
Start: 2022-05-24 | End: 2022-05-25

## 2022-05-24 RX ORDER — LINEZOLID 2 MG/ML
600 INJECTION, SOLUTION INTRAVENOUS EVERY 12 HOURS
Status: DISCONTINUED | OUTPATIENT
Start: 2022-05-24 | End: 2022-05-25

## 2022-05-24 RX ORDER — SODIUM CHLORIDE 9 MG/ML
1000 INJECTION, SOLUTION INTRAVENOUS ONCE
Status: COMPLETED | OUTPATIENT
Start: 2022-05-24 | End: 2022-05-24

## 2022-05-24 RX ORDER — NOREPINEPHRINE BITARTRATE 0.03 MG/ML
INJECTION, SOLUTION INTRAVENOUS
Status: COMPLETED
Start: 2022-05-24 | End: 2022-05-24

## 2022-05-24 RX ORDER — DEXTROSE MONOHYDRATE 25 G/50ML
25 INJECTION, SOLUTION INTRAVENOUS
Status: DISCONTINUED | OUTPATIENT
Start: 2022-05-24 | End: 2022-05-25

## 2022-05-24 RX ORDER — NOREPINEPHRINE BITARTRATE 0.03 MG/ML
0-100 INJECTION, SOLUTION INTRAVENOUS CONTINUOUS
Status: DISPENSED | OUTPATIENT
Start: 2022-05-24 | End: 2022-05-28

## 2022-05-24 RX ORDER — SODIUM BICARBONATE IN D5W 150/1000ML
PLASTIC BAG, INJECTION (ML) INTRAVENOUS CONTINUOUS
Status: DISCONTINUED | OUTPATIENT
Start: 2022-05-24 | End: 2022-05-25

## 2022-05-24 RX ORDER — SODIUM CHLORIDE 9 MG/ML
1510 INJECTION, SOLUTION INTRAVENOUS ONCE
Status: COMPLETED | OUTPATIENT
Start: 2022-05-24 | End: 2022-05-24

## 2022-05-24 RX ORDER — DEXTROSE MONOHYDRATE 25 G/50ML
25 INJECTION, SOLUTION INTRAVENOUS
Status: DISCONTINUED | OUTPATIENT
Start: 2022-05-24 | End: 2022-05-24

## 2022-05-24 RX ORDER — LIDOCAINE HYDROCHLORIDE 10 MG/ML
INJECTION, SOLUTION EPIDURAL; INFILTRATION; INTRACAUDAL; PERINEURAL
Status: COMPLETED
Start: 2022-05-24 | End: 2022-05-24

## 2022-05-24 RX ORDER — SODIUM CHLORIDE, SODIUM LACTATE, POTASSIUM CHLORIDE, AND CALCIUM CHLORIDE .6; .31; .03; .02 G/100ML; G/100ML; G/100ML; G/100ML
1000 INJECTION, SOLUTION INTRAVENOUS ONCE
Status: COMPLETED | OUTPATIENT
Start: 2022-05-24 | End: 2022-05-24

## 2022-05-24 RX ORDER — CLINDAMYCIN PHOSPHATE 900 MG/50ML
900 INJECTION, SOLUTION INTRAVENOUS ONCE
Status: COMPLETED | OUTPATIENT
Start: 2022-05-24 | End: 2022-05-24

## 2022-05-24 RX ORDER — HYDROMORPHONE HYDROCHLORIDE 1 MG/ML
0.5 INJECTION, SOLUTION INTRAMUSCULAR; INTRAVENOUS; SUBCUTANEOUS
Status: DISCONTINUED | OUTPATIENT
Start: 2022-05-24 | End: 2022-06-22 | Stop reason: HOSPADM

## 2022-05-24 RX ORDER — SODIUM CHLORIDE, SODIUM LACTATE, POTASSIUM CHLORIDE, CALCIUM CHLORIDE 600; 310; 30; 20 MG/100ML; MG/100ML; MG/100ML; MG/100ML
INJECTION, SOLUTION INTRAVENOUS CONTINUOUS
Status: DISCONTINUED | OUTPATIENT
Start: 2022-05-24 | End: 2022-05-24

## 2022-05-24 RX ADMIN — CLINDAMYCIN PHOSPHATE 900 MG: 900 INJECTION, SOLUTION INTRAVENOUS at 13:18

## 2022-05-24 RX ADMIN — CEFTRIAXONE SODIUM 2 G: 10 INJECTION, POWDER, FOR SOLUTION INTRAVENOUS at 15:33

## 2022-05-24 RX ADMIN — HYDROMORPHONE HYDROCHLORIDE 0.5 MG: 1 INJECTION, SOLUTION INTRAMUSCULAR; INTRAVENOUS; SUBCUTANEOUS at 21:43

## 2022-05-24 RX ADMIN — SODIUM CHLORIDE 1510 ML: 9 INJECTION, SOLUTION INTRAVENOUS at 12:30

## 2022-05-24 RX ADMIN — HYDROCORTISONE SODIUM SUCCINATE 100 MG: 100 INJECTION, POWDER, FOR SOLUTION INTRAMUSCULAR; INTRAVENOUS at 13:08

## 2022-05-24 RX ADMIN — SODIUM BICARBONATE: 84 INJECTION, SOLUTION INTRAVENOUS at 16:56

## 2022-05-24 RX ADMIN — HYDROCORTISONE SODIUM SUCCINATE 50 MG: 100 INJECTION, POWDER, FOR SOLUTION INTRAMUSCULAR; INTRAVENOUS at 17:23

## 2022-05-24 RX ADMIN — PIPERACILLIN AND TAZOBACTAM 4.5 G: 4; .5 INJECTION, POWDER, FOR SOLUTION INTRAVENOUS at 17:23

## 2022-05-24 RX ADMIN — INSULIN HUMAN 5 UNITS: 100 INJECTION, SOLUTION PARENTERAL at 23:58

## 2022-05-24 RX ADMIN — PIPERACILLIN AND TAZOBACTAM 4.5 G: 4; .5 INJECTION, POWDER, FOR SOLUTION INTRAVENOUS at 13:57

## 2022-05-24 RX ADMIN — IOHEXOL 75 ML: 350 INJECTION, SOLUTION INTRAVENOUS at 12:15

## 2022-05-24 RX ADMIN — LIDOCAINE HYDROCHLORIDE: 10 INJECTION, SOLUTION EPIDURAL; INFILTRATION; INTRACAUDAL; PERINEURAL at 13:15

## 2022-05-24 RX ADMIN — NOREPINEPHRINE BITARTRATE 30 MCG/MIN: 1 INJECTION, SOLUTION, CONCENTRATE INTRAVENOUS at 17:08

## 2022-05-24 RX ADMIN — SODIUM CHLORIDE, POTASSIUM CHLORIDE, SODIUM LACTATE AND CALCIUM CHLORIDE 1000 ML: 600; 310; 30; 20 INJECTION, SOLUTION INTRAVENOUS at 15:15

## 2022-05-24 RX ADMIN — LINEZOLID 600 MG: 600 INJECTION, SOLUTION INTRAVENOUS at 21:44

## 2022-05-24 RX ADMIN — HYDROMORPHONE HYDROCHLORIDE 0.5 MG: 1 INJECTION, SOLUTION INTRAMUSCULAR; INTRAVENOUS; SUBCUTANEOUS at 17:22

## 2022-05-24 RX ADMIN — VANCOMYCIN HYDROCHLORIDE 2750 MG: 500 INJECTION, POWDER, LYOPHILIZED, FOR SOLUTION INTRAVENOUS at 13:03

## 2022-05-24 RX ADMIN — INSULIN HUMAN 3 UNITS: 100 INJECTION, SOLUTION PARENTERAL at 17:40

## 2022-05-24 RX ADMIN — NOREPINEPHRINE BITARTRATE 5 MCG/MIN: 1 INJECTION, SOLUTION, CONCENTRATE INTRAVENOUS at 11:35

## 2022-05-24 RX ADMIN — HYDROCORTISONE SODIUM SUCCINATE 50 MG: 100 INJECTION, POWDER, FOR SOLUTION INTRAMUSCULAR; INTRAVENOUS at 23:52

## 2022-05-24 RX ADMIN — CLINDAMYCIN IN 5 PERCENT DEXTROSE 900 MG: 18 INJECTION, SOLUTION INTRAVENOUS at 21:44

## 2022-05-24 RX ADMIN — VASOPRESSIN 0.03 UNITS/MIN: 20 INJECTION, SOLUTION INTRAMUSCULAR; SUBCUTANEOUS at 21:20

## 2022-05-24 RX ADMIN — NOREPINEPHRINE BITARTRATE 20 MCG/MIN: 1 INJECTION, SOLUTION, CONCENTRATE INTRAVENOUS at 21:59

## 2022-05-24 RX ADMIN — SODIUM CHLORIDE 1000 ML: 9 INJECTION, SOLUTION INTRAVENOUS at 11:35

## 2022-05-24 ASSESSMENT — PATIENT HEALTH QUESTIONNAIRE - PHQ9
2. FEELING DOWN, DEPRESSED, IRRITABLE, OR HOPELESS: NOT AT ALL
1. LITTLE INTEREST OR PLEASURE IN DOING THINGS: NOT AT ALL
SUM OF ALL RESPONSES TO PHQ9 QUESTIONS 1 AND 2: 0

## 2022-05-24 ASSESSMENT — PAIN DESCRIPTION - PAIN TYPE
TYPE: ACUTE PAIN

## 2022-05-24 ASSESSMENT — FIBROSIS 4 INDEX
FIB4 SCORE: 2.23
FIB4 SCORE: 2.23

## 2022-05-24 NOTE — CONSULTS
"[facility] -  CONSULTATION NOTE               Author: Roderick Ramirez M.D. Date & Time: 5/24/2022  3:56 PM       REASON FOR CONSULTATION:   Renal Transplant    CHIEF COMPLAINT:   \"AMS\"    HISTORY OF PRESENT ILLNESS:    Patient is a 66 year old male with a PMHx of renal transplant 9/10/2010 for polycystic kidney disease, DM, thrombocytopenia, CKD3b, pafib, covid19 infection, who presents for AMS.  He was brought in activated trauma for fall, CT imaging has been negative, but was in severe septic shock started on pressors and give nfluid boluses.  On broad spectrum abx.  Currently complains of right leg pain but thinks its better.  Confirms his last dose of tacrolimus and cellcept was yesterday.  Currently on levophed    REVIEW OF SYSTEMS:    10 point ROS was performed and is as per HPI or otherwise negative    PAST MEDICAL HISTORY:   Past Medical History:   Diagnosis Date   • Benign essential hypertension    • Hyperlipoproteinemia    • Hypertension     not on meds anymore   • Pain    • Polycystic kidney 9/10/10    RIGHT KIDNEY TRANSPLANT   • Sleep apnea    • Snoring        PAST SURGICAL HISTORY:   Past Surgical History:   Procedure Laterality Date   • KNEE MANIPULATION  2/16/2012    Performed by LATOYA CONNER at SURGERY [facility] ORS   • KNEE UNICOMPARTMENTAL  12/23/2011    Performed by LATOYA CONNER at SURGERY [facility] ORS   • KNEE ARTHROSCOPY  12/23/2011    Performed by LATOYA CONNER at SURGERY [facility] ORS   • KNEE ARTHROSCOPY  5/3/2011    Performed by HANANE GOLDMAN at SURGERY SAME DAY [facility] ORS   • MENISCECTOMY, KNEE, MEDIAL  5/3/2011    Performed by HANANE GOLDMAN at SURGERY SAME DAY [facility] ORS   • OTHER  9/10/10    RIGHT KIDNEY TRANSPLANT   • KNEE ARTHROPLASTY TOTAL  1/12/07    RIGHT   • KNEE ARTHROSCOPY  4/10/06    RIGHT   • OTHER ORTHOPEDIC SURGERY  7/8/74    LEFT KNEE DEBRIDEMENT       FAMILY HISTORY:   History reviewed. No pertinent family history.    SOCIAL " "HISTORY:   Social History     Tobacco Use   Smoking Status Never Smoker   Smokeless Tobacco Never Used     Social History     Substance and Sexual Activity   Alcohol Use No     Social History     Substance and Sexual Activity   Drug Use No       HOME MEDICATIONS:   Reviewed and documented in chart    LABORATORY STUDIES:   Recent Labs     05/24/22  1122   SODIUM 142   POTASSIUM 4.0   CHLORIDE 106   CO2 15*   GLUCOSE 192*   BUN 33*   CREATININE 2.77*   CALCIUM 8.9       ALLERGIES:  Doxycycline    VS:  BP (!) 88/47   Pulse (!) 104   Temp 37.4 °C (99.3 °F) (Temporal)   Resp (!) 29   Ht 1.956 m (6' 5\")   Wt 107 kg (235 lb)   SpO2 91%   BMI 27.87 kg/m²     Physical Exam  Constitutional:       Appearance: He is ill-appearing.   HENT:      Head:      Comments: Abrasions on face     Right Ear: External ear normal.      Left Ear: External ear normal.      Nose: Nose normal.      Mouth/Throat:      Mouth: Mucous membranes are dry.   Eyes:      General: No scleral icterus.  Cardiovascular:      Rate and Rhythm: Tachycardia present.   Pulmonary:      Effort: Pulmonary effort is normal.   Abdominal:      General: Abdomen is flat.      Palpations: Abdomen is soft.   Musculoskeletal:         General: Swelling present.      Comments: Right leg erythematous with ulcer   Skin:     General: Skin is warm.   Neurological:      General: No focal deficit present.      Mental Status: He is alert.   Psychiatric:         Mood and Affect: Mood normal.         FLUID BALANCE:  No intake/output data recorded.    IMAGING:  All imaging reviewed from admission to present day    IMPRESSION:  # SANKET on CKD    - History of renal transplant 9/10/2010 for polycystic kidney    - SANKET likely ATN from septic shock and contrst baseline cr 1.5-1.6 now up to 2.77    - UA with no significant protein or blood    - Given severity of sepsis as discussed with Dr Swartz, will hold home tacrolimus and cellcept    - Can hold home prednisone if doing stress dose " steroids  # Septic shock    - Related to severe soft tissue infection    - On empiric abx  # Encephalopathy  # Lactic acidosis  # Diabetes  # Acidosis    - Bicarb drip      PLAN:  - No compelling indication for RRT  - Given severity of sepsis as discussed with Dr Swartz, will hold home tacrolimus and cellcept  - Start bicarb drip at 125cc/hr  - Daily evaluation for RRT needs  - Dose all meds per eGFR < 15  - Abx per primary team  - Updated family at bedside    Thank you for the consultation!

## 2022-05-24 NOTE — ED NOTES
Med rec completed per pt's home pharmacy ([facility])   No PO antibiotics in the last 30 days   Unknown last doses of medications taken   Pt unable to participate in an interview     Pt [facility] pt has Januvia ready to be picked up but pt has not yet started taking this medication     Pt only takes Metoprolol Tartrate ONCE a day per [facility]    Unable to verify OTC medications

## 2022-05-24 NOTE — ED PROVIDER NOTES
ED Provider Note    CHIEF COMPLAINT  No chief complaint on file.      HPI  Jama Altman is a 66 y.o. male who presents with altered mental status.  Patient apparently has had some pain in his right leg after he struck it on an object about 5 days ago.  Pain is progressively worsened.  Patient reports that he also fell and struck his head today when trying to ambulate secondary to pain.  Patient is on Plavix.  Patient denies any associated neck or back pain.  Denies any chest or abdominal pain.  He has a history of paroxysmal atrial fibrillation but does not feel any different today.  Patient denies any significant shortness of breath.  Denies any fevers or chills.  Denies associated cough.  Patient's mental status is rapidly waxing and waning and therefore history is limited.  Patient is status post renal transplant approximately a decade ago.    REVIEW OF SYSTEMS  ROS    See HPI for further details. All other systems are negative.     PAST MEDICAL HISTORY   has a past medical history of Benign essential hypertension, Hyperlipoproteinemia, Hypertension, Pain, Polycystic kidney (9/10/10), Sleep apnea, and Snoring.    SOCIAL HISTORY  Social History     Tobacco Use   • Smoking status: Never Smoker   • Smokeless tobacco: Never Used   Vaping Use   • Vaping Use: Never used   Substance and Sexual Activity   • Alcohol use: No   • Drug use: No   • Sexual activity: Yes     Partners: Female       SURGICAL HISTORY   has a past surgical history that includes knee arthroplasty total (1/12/07); knee arthroscopy (4/10/06); other orthopedic surgery (7/8/74); other (9/10/10); knee arthroscopy (5/3/2011); meniscectomy, knee, medial (5/3/2011); knee unicompartmental (12/23/2011); knee arthroscopy (12/23/2011); and knee manipulation (2/16/2012).    CURRENT MEDICATIONS  Home Medications     Reviewed by Emir Potter (Pharmacy Tech) on 05/24/22 at 1130  Med List Status: Complete   Medication Last Dose Status   atorvastatin (LIPITOR)  80 MG tablet UNK Active   gabapentin (NEURONTIN) 300 MG Cap UNK Active   glyBURIDE (DIABETA) 5 MG Tab UNK Active   metoprolol tartrate (LOPRESSOR) 25 MG Tab UNK Active   mycophenolate (CELLCEPT) 250 MG Cap UNK Active   omeprazole (PRILOSEC) 20 MG delayed-release capsule UNK Active   pioglitazone (ACTOS) 45 MG Tab UNK Active   predniSONE (DELTASONE) 5 MG Tab UNK Active   SITagliptin (JANUVIA) 50 MG Tab NOT YET STARTED Active   tacrolimus (PROGRAF) 1 MG CAPS UNK Active   tadalafil (CIALIS) 5 MG tablet UNK Active   TRADJENTA 5 MG Tab tablet UNK Active                ALLERGIES  Allergies   Allergen Reactions   • Doxycycline Rash     Sweats and shakes: 9/28/17: Clarified allergy with patient. Allergy was in 1998 and he doesn't remember what happened. He thought the medication is for pain.  Tolerates doxycycline 9/2017       PHYSICAL EXAM  Vitals:    05/24/22 1131   BP: (!) 71/48   Pulse: (!) 129   Resp: (!) 25   Temp:    SpO2: 94%       Physical Exam  Constitutional:       Appearance: He is well-developed.   HENT:      Head:      Comments: Bilateral abrasions of patient's forehead with a contusion midline, no scalp hematoma, no hemotympanum  Eyes:      Conjunctiva/sclera: Conjunctivae normal.      Pupils: Pupils are equal, round, and reactive to light.   Cardiovascular:      Rate and Rhythm: Normal rate and regular rhythm.      Heart sounds: No murmur heard.    No friction rub. No gallop.   Pulmonary:      Effort: Pulmonary effort is normal. No respiratory distress.      Breath sounds: No wheezing.      Comments: Tachypneic  Abdominal:      General: Bowel sounds are normal. There is no distension.      Palpations: Abdomen is soft.      Tenderness: There is no abdominal tenderness. There is no rebound.      Comments: Right lower quadrant incisional scar without any associated tenderness or overlying skin changes   Musculoskeletal:      Cervical back: Normal range of motion and neck supple.      Comments: Right lower  extremity with small chronic appearing wound that is scabbed over with considerable tenderness surrounding this.  There is associated edema of the affected extremity, 2+.  Distal pulses are equal bilaterally sensation intact throughout.   Skin:     General: Skin is warm and dry.   Neurological:      Mental Status: He is alert and oriented to person, place, and time.   Psychiatric:         Behavior: Behavior normal.             DIAGNOSTIC STUDIES / PROCEDURES      LABS  Results for orders placed or performed during the hospital encounter of 05/24/22   COV-2, FLU A/B, AND RSV BY PCR (2-4 HOURS [facility]): Collect NP swab in VTM    Specimen: Respirate   Result Value Ref Range    Influenza virus A RNA Negative Negative    Influenza virus B, PCR Negative Negative    RSV, PCR Negative Negative    SARS-CoV-2 by PCR NotDetected     SARS-CoV-2 Source NP Swab    DIAGNOSTIC ALCOHOL   Result Value Ref Range    Diagnostic Alcohol <10.1 <10.1 mg/dL   CBC WITHOUT DIFFERENTIAL   Result Value Ref Range    WBC 8.6 4.8 - 10.8 K/uL    RBC 4.67 (L) 4.70 - 6.10 M/uL    Hemoglobin 13.7 (L) 14.0 - 18.0 g/dL    Hematocrit 43.9 42.0 - 52.0 %    MCV 94.0 81.4 - 97.8 fL    MCH 29.3 27.0 - 33.0 pg    MCHC 31.2 (L) 33.7 - 35.3 g/dL    RDW 50.0 35.9 - 50.0 fL    Platelet Count 119 (L) 164 - 446 K/uL    MPV 11.4 9.0 - 12.9 fL   Comp Metabolic Panel   Result Value Ref Range    Sodium 142 135 - 145 mmol/L    Potassium 4.0 3.6 - 5.5 mmol/L    Chloride 106 96 - 112 mmol/L    Co2 15 (L) 20 - 33 mmol/L    Anion Gap 21.0 (H) 7.0 - 16.0    Glucose 192 (H) 65 - 99 mg/dL    Bun 33 (H) 8 - 22 mg/dL    Creatinine 2.77 (H) 0.50 - 1.40 mg/dL    Calcium 8.9 8.5 - 10.5 mg/dL    AST(SGOT) 24 12 - 45 U/L    ALT(SGPT) 22 2 - 50 U/L    Alkaline Phosphatase 75 30 - 99 U/L    Total Bilirubin 0.9 0.1 - 1.5 mg/dL    Albumin 3.7 3.2 - 4.9 g/dL    Total Protein 5.4 (L) 6.0 - 8.2 g/dL    Globulin 1.7 (L) 1.9 - 3.5 g/dL    A-G Ratio 2.2 g/dL   Prothrombin Time   Result Value  Ref Range    PT 14.5 12.0 - 14.6 sec    INR 1.16 (H) 0.87 - 1.13   APTT   Result Value Ref Range    APTT 30.7 24.7 - 36.0 sec   PROCALCITONIN   Result Value Ref Range    Procalcitonin 33.16 (HH) <0.25 ng/mL   COD - Adult (Type and Screen)   Result Value Ref Range    ABO Grouping Only O     Rh Grouping Only POS     Antibody Screen-Cod NEG    LACTIC ACID   Result Value Ref Range    Lactic Acid 9.8 (HH) 0.5 - 2.0 mmol/L   ESTIMATED GFR   Result Value Ref Range    GFR (CKD-EPI) 25 (A) >60 mL/min/1.73 m 2   URINALYSIS,CULTURE IF INDICATED    Specimen: Urine, Cath   Result Value Ref Range    Color Yellow     Character Clear     Specific Gravity 1.017 <1.035    Ph 5.0 5.0 - 8.0    Glucose 500 (A) Negative mg/dL    Ketones Negative Negative mg/dL    Protein Negative Negative mg/dL    Bilirubin Negative Negative    Urobilinogen, Urine 0.2 Negative    Nitrite Negative Negative    Leukocyte Esterase Negative Negative    Occult Blood Negative Negative    Micro Urine Req see below    CSF CULTURE    Specimen: Tap; CSF   Result Value Ref Range    Significant Indicator NEG     Source CSF     Site TAP     Culture Result -     Gram Stain Result Rare WBCs.  No organisms seen.      CSF Cell Count   Result Value Ref Range    Number Of Tubes 4     Volume 2.0 mL    Color-Body Fluid Pink     Character-Body Fluid Cloudy     Supernatant Appearance Colorless     Total RBC Count 3000 cells/uL    Crenated RBC 0 %    Total WBC Count 2 0 - 10 cells/uL    Polys 18 %    Lymphs 34 %    Mononuclear Cells - CSF 6 %    CSF Tube Number 3    CSF GLUCOSE   Result Value Ref Range    Glucose  (H) 40 - 80 mg/dL   CSF PROTEIN   Result Value Ref Range    Total Protein, CSF 78 (H) 15 - 45 mg/dL   CRP QUANTITIVE (NON-CARDIAC)   Result Value Ref Range    Stat C-Reactive Protein 9.99 (H) 0.00 - 0.75 mg/dL   LACTIC ACID   Result Value Ref Range    Lactic Acid 6.4 (HH) 0.5 - 2.0 mmol/L   CREATINE KINASE   Result Value Ref Range    CPK Total 684 (H) 0 - 154  U/L   GRAM STAIN    Specimen: CSF   Result Value Ref Range    Significant Indicator .     Source CSF     Site TAP     Gram Stain Result Rare WBCs.  No organisms seen.      EKG   Result Value Ref Range    Report       [facility] Emergency Dept.    Test Date:  2022  Pt Name:    CIARA FORRESTER                   Department: ER  MRN:        [id number]                      Room:       RD 05  Gender:     Male                         Technician: [id number]  :        [id number]                   Requested By:ER TRIAGE PROTOCOL  Order #:    [id number]                    Reading MD: Nurys Cantu MD    Measurements  Intervals                                Axis  Rate:       156                          P:  CT:                                      QRS:        166  QRSD:       140                          T:          -1  QT:         290  QTc:        467    Interpretive Statements  Atrial fibrillation with RVR, ST depressions which are diffuse, QRS unchanged  from  and 2020.  Right axis  Electronically Signed On 2022 12:49:49 PDT by Nurys Cantu MD           RADIOLOGY  DX-CHEST-PORTABLE (1 VIEW)   Final Result      Left internal jugular central venous access catheter terminates over a persistent left superior vena cava. No postprocedure visible pneumothorax.      The remainder is stable.      DX-TIBIA AND FIBULA RIGHT   Final Result      1. Right lower leg soft tissue swelling.   2. No acute fracture or subluxation.   3. Postsurgical changes of right total knee arthroplasty.      US-ABDOMEN F.A.S.T. LTD (FOR ED USE ONLY)   Final Result      No free fluid seen in all 4 quadrants.      Negative FAST scan.            CT-ABDOMEN-PELVIS WITH   Final Result      1. No acute posttraumatic findings in the abdomen or pelvis.   2. Bibasilar subsegmental atelectasis.   3. Right pelvic transplant kidney without hydronephrosis.   4. Polycystic left kidney.   5. Diverticulosis without diverticulitis. Normal  appendix.      CT-CTA CHEST PULMONARY ARTERY W/ RECONS   Final Result      1.  No CT evidence of pulmonary emboli.      2.  Bibasilar atelectasis.      3.  No evidence of rib fracture.      4. Diffuse coronary artery calcification.      CT-HEAD W/O   Final Result      1.  Cerebral atrophy.      2.  Otherwise, Head CT without contrast within normal limits. No evidence of acute cerebral infarction, hemorrhage or mass lesion.         CT-CSPINE WITHOUT PLUS RECONS   Final Result      1.  Moderate osteoarthritic changes at the C6-7 level with disc space narrowing and marginal osteophytosis. Further there is moderate cervical spondylotic changes at this level.      2.  No evidence of cervical spine fracture and/or subluxation.      DX-PELVIS-1 OR 2 VIEWS   Final Result      1.  Unremarkable single AP view of the pelvis.      DX-CHEST-LIMITED (1 VIEW)   Final Result      1.  Curvilinear perihilar opacities likely atelectasis and/or parenchymal scarring.      2.  Mild cardiomegaly.      CT-EXTREMITY, LOWER W/O RIGHT    (Results Pending)       Procedure Lumbar Puncture    Date/Time: 5/24/2022 4:20 PM  Performed by: Pepe Nuno M.D.  Authorized by: Pepe Nuno M.D.     Consent:     Consent obtained:  Emergent situation    Consent given by:  Patient (children)    Risks discussed:  Infection, bleeding, headache and nerve damage  Pre-procedure details:     Procedure purpose:  Diagnostic  Procedure details:     Lumbar space:  L3-L4 interspace    Patient position:  L lateral decubitus    Needle gauge:  20    Needle type:  Spinal needle - Quincke tip    Needle length (in):  3.5    Ultrasound guidance: no      Number of attempts:  3    Fluid appearance:  Blood-tinged and clear    Tubes of fluid:  4  Post-procedure:     Puncture site:  Adhesive bandage applied    Patient tolerance of procedure:  Tolerated well, no immediate complications  Comments:      Patient tolerated procedure well, initially CSF was blood-tinged but  this resolved then on patient's fourth tube CSF was crystal clear  Central Line Insertion    Date/Time: 5/24/2022 4:21 PM  Performed by: Pepe Nuno M.D.  Authorized by: Pepe Nuno M.D.     Consent:     Consent obtained:  Emergent situation    Consent given by:  Patient (children)    Risks discussed:  Incorrect placement, nerve damage, infection and pneumothorax  Pre-procedure details:     Hand hygiene: Hand hygiene performed prior to insertion      Sterile barrier technique: All elements of maximal sterile technique followed (Sterile cap, gown, gloves, drape and technique were employed)      Skin preparation:  2% chlorhexidine    Skin preparation agent: Skin preparation agent completely dried prior to procedure    Anesthesia:     Anesthesia method:  Local infiltration    Local anesthetic:  Lidocaine 1% WITH epi  Procedure details:     Location:  L internal jugular    Patient position:  Flat    Procedural supplies:  Triple lumen    Landmarks identified: yes      Ultrasound guidance: yes      Sterile ultrasound techniques: Sterile gel and sterile probe covers were used      Number of attempts:  1    Successful placement: yes    Post-procedure details:     Post-procedure:  Dressing applied and line sutured    Assessment:  Blood return through all ports, no pneumothorax on x-ray, placement verified by x-ray and free fluid flow    Patient tolerance of procedure:  Tolerated well, no immediate complications        1 hr 15 minutes of critical care time was spent with patient    COURSE & MEDICAL DECISION MAKING  Pertinent Labs & Imaging studies reviewed. (See chart for details)    Patient here brought in by EMS, shortly after arrival charge nurse changed patient's triage to a trauma red given the patient had fallen, is on Plavix and was hypotensive.  Patient's symptoms appear most consistent with likely metabolic cause and possible sepsis.  Patient without any overt evidence of source of his infection.  The leg  wound appears relatively benign but is significantly painful.  Patient was worked up from a trauma perspective and given his associated extremity edema with his hypotension and tachypnea a CTA was checked to evaluate for possible large pulmonary embolus.  Patient's graft site does not appear to have any overlying skin changes, he does not have any associated tenderness here.  Patient was significantly hypotensive and not immediately responding to aggressive IV fluids, therefore he was started on Levophed peripherally to facilitate expedited CT.  Patient with atrial fibrillation with rapid ventricular response, this is likely at least partially compensatory, will treat patient's likely sepsis prior to any rate control.  Patient is not anticoagulated and it is unclear how long he has had atrial fibrillation, therefore I believe that rhythm control or cardioversion is contraindicated at this point.  Patient started on aggressive IV antibiotics including clindamycin high-dose for C. difficile toxin mediation.  Aggressive IV fluids continued.  Prior to patient going to CT I did discuss the risk of possible acute kidney injury with patient especially in the setting of his renal transplant  CT head CT cervical spine CTA chest and CT abdomen pelvis failed to reveal any acute traumatic findings  Patient white count is reassuringly normal however his lactic acid is significantly elevated, his procalcitonin is also incredibly elevated at 33.  Patient is chronically immunosuppressed.  Patient had central line placed for continued pressors.  Patient was given stress dose steroids, lumbar puncture was performed.  Patient's urinalysis is inconsistent with infection, chest x-ray fails to reveal any evidence of pneumonia, lumbar puncture results are pending but fluid is very clear and not entirely consistent with infection.  Given this I discussed the case with general surgery about possible necrotizing fasciitis who referred me to  orthopedics, given this I discussed the case with orthopedics who will see patient.  Patient admitted to the ICU in guarded condition    FINAL IMPRESSION  1.  Altered mental status, lactic acidosis, shock, atrial fibrillation with rapid ventricular response, head trauma, leg wound      Electronically signed by: Pepe Nuno M.D., 5/24/2022 11:37 AM

## 2022-05-24 NOTE — CONSULTS
Critical Care/Pulmonary Consultation    Date of Service: 5/24/2022    Date of Admission:  5/24/2022 11:10 AM    Consulting Physician: Reuben Swartz M.D.    Chief Complaint:  GLF      History of Present Illness: Jama Altman is a 66 y.o. male with a past medical history of type 2 diabetes mellitus, stage IIIb chronic kidney disease, atrial fibrillation,who presented to the emergency department after a ground-level fall. Family at bedside state that they went to his home to visit and he was able to walk to the door to get them , then walked to his bed. He complained of RLE pain . No c/o fever, chills, n/v, urinary complaints, nor headaches. 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 with osteophytosis and spondylotic changes negative for fractures CTA chest negative, no rib fractures, imaging does note diffuse coronary artery calcification; bibasilar subsegmental atelectasis, right pelvic transplant kidney without hydronephrosis, polycystic left kidney, diverticulosis without diverticulitis noted fast ultrasound negative; Medications given in ED ceftriaxone Zosyn and vancomycin, lidocaine, NS bolus 1500, hydrocortisone succinate      Review of Systems   Unable to perform ROS: Mental acuity       Home Medications     Reviewed by Emir Potter (Pharmacy Tech) on 05/24/22 at 1130  Med List Status: Complete   Medication Last Dose Status   atorvastatin (LIPITOR) 80 MG tablet UNK Active   gabapentin  (NEURONTIN) 300 MG Cap UNK Active   glyBURIDE (DIABETA) 5 MG Tab UNK Active   metoprolol tartrate (LOPRESSOR) 25 MG Tab UNK Active   mycophenolate (CELLCEPT) 250 MG Cap UNK Active   omeprazole (PRILOSEC) 20 MG delayed-release capsule UNK Active   pioglitazone (ACTOS) 45 MG Tab UNK Active   predniSONE (DELTASONE) 5 MG Tab UNK Active   SITagliptin (JANUVIA) 50 MG Tab NOT YET STARTED Active   tacrolimus (PROGRAF) 1 MG CAPS UNK Active   tadalafil (CIALIS) 5 MG tablet UNK Active   TRADJENTA 5 MG Tab tablet UNK Active                Social History     Tobacco Use   • Smoking status: Never Smoker   • Smokeless tobacco: Never Used   Vaping Use   • Vaping Use: Never used   Substance Use Topics   • Alcohol use: No   • Drug use: No        Past Medical History:   Diagnosis Date   • Benign essential hypertension    • Hyperlipoproteinemia    • Hypertension     not on meds anymore   • Pain    • Polycystic kidney 9/10/10    RIGHT KIDNEY TRANSPLANT   • Sleep apnea    • Snoring        Past Surgical History:   Procedure Laterality Date   • KNEE MANIPULATION  2/16/2012    Performed by LATOYA CONNER at SURGERY [facility] ORS   • KNEE UNICOMPARTMENTAL  12/23/2011    Performed by LATOYA CONNER at SURGERY [facility] ORS   • KNEE ARTHROSCOPY  12/23/2011    Performed by LATOYA CONNER at SURGERY [facility] ORS   • KNEE ARTHROSCOPY  5/3/2011    Performed by HANANE GOLDMAN at SURGERY SAME DAY [facility]S   • MENISCECTOMY, KNEE, MEDIAL  5/3/2011    Performed by HANANE GOLDMAN at SURGERY SAME DAY [facility] ORS   • OTHER  9/10/10    RIGHT KIDNEY TRANSPLANT   • KNEE ARTHROPLASTY TOTAL  1/12/07    RIGHT   • KNEE ARTHROSCOPY  4/10/06    RIGHT   • OTHER ORTHOPEDIC SURGERY  7/8/74    LEFT KNEE DEBRIDEMENT       Allergies: Doxycycline    History reviewed. No pertinent family history.    Vitals:    05/24/22 1120 05/24/22 1122 05/24/22 1125 05/24/22 1131   Height:       Weight:  107 kg (235 lb)     Weight % change since last entry.:  0 %     BP:  "(!) 96/50  (!) 88/49 (!) 71/48   Pulse: (!) 145  (!) 159 (!) 129   BMI (Calculated):       Resp: (!) 30  (!) 40 (!) 25   Temp: 37.4 °C (99.3 °F)      TempSrc: Temporal       05/24/22 1200 05/24/22 1201 05/24/22 1215   Height:  1.956 m (6' 5\")    Weight:  107 kg (235 lb)    Weight % change since last entry.:  0 %    BP: (!) 73/36  (!) 69/45   Pulse: (!) 131  (!) 140   BMI (Calculated):  27.87    Resp: (!) 33  (!) 31   Temp:      TempSrc:            Physical Exam  Constitutional:       General: He is not in acute distress.     Appearance: He is not diaphoretic.   HENT:      Head: Normocephalic and atraumatic.   Eyes:      Conjunctiva/sclera: Conjunctivae normal.   Neck:      Thyroid: No thyromegaly.      Trachea: No tracheal deviation.   Cardiovascular:      Rate and Rhythm: Regular rhythm.      Heart sounds: Normal heart sounds. No murmur heard.    No friction rub.   Pulmonary:      Breath sounds: No wheezing or rales.   Abdominal:      General: There is no distension.      Tenderness: There is no abdominal tenderness.   Genitourinary:     Penis: No discharge.       Rectum: Guaiac result negative.   Musculoskeletal:         General: No tenderness or deformity.      Cervical back: Normal range of motion and neck supple.   Skin:     Findings: No erythema or rash.      Comments: Open wound RLE, 1cm in diameter, yellow dischareg, erythema surrounding, no swelling, painful to touch surrounding leg   Neurological:      Cranial Nerves: No cranial nerve deficit.      Coordination: Coordination normal.           Intake/Output Summary (Last 24 hours) at 5/24/2022 1332  Last data filed at 5/24/2022 1123  Gross per 24 hour   Intake 1750 ml   Output 0 ml   Net 1750 ml       Recent Labs     05/24/22  1122   WBC 8.6   ASTSGOT 24   ALTSGPT 22   ALKPHOSPHAT 75   TBILIRUBIN 0.9     Recent Labs     05/24/22  1122   SODIUM 142   POTASSIUM 4.0   CHLORIDE 106   CO2 15*   BUN 33*   CREATININE 2.77*   CALCIUM 8.9     Recent Labs     " 05/24/22  1122   ALTSGPT 22   ASTSGOT 24   ALKPHOSPHAT 75   TBILIRUBIN 0.9   GLUCOSE 192*         US-ABDOMEN F.A.S.T. LTD (FOR ED USE ONLY)   Final Result      No free fluid seen in all 4 quadrants.      Negative FAST scan.            CT-ABDOMEN-PELVIS WITH   Final Result      1. No acute posttraumatic findings in the abdomen or pelvis.   2. Bibasilar subsegmental atelectasis.   3. Right pelvic transplant kidney without hydronephrosis.   4. Polycystic left kidney.   5. Diverticulosis without diverticulitis. Normal appendix.      CT-CTA CHEST PULMONARY ARTERY W/ RECONS   Final Result      1.  No CT evidence of pulmonary emboli.      2.  Bibasilar atelectasis.      3.  No evidence of rib fracture.      4. Diffuse coronary artery calcification.      CT-HEAD W/O   Final Result      1.  Cerebral atrophy.      2.  Otherwise, Head CT without contrast within normal limits. No evidence of acute cerebral infarction, hemorrhage or mass lesion.         CT-CSPINE WITHOUT PLUS RECONS   Final Result      1.  Moderate osteoarthritic changes at the C6-7 level with disc space narrowing and marginal osteophytosis. Further there is moderate cervical spondylotic changes at this level.      2.  No evidence of cervical spine fracture and/or subluxation.      DX-PELVIS-1 OR 2 VIEWS   Final Result      1.  Unremarkable single AP view of the pelvis.      DX-CHEST-LIMITED (1 VIEW)   Final Result      1.  Curvilinear perihilar opacities likely atelectasis and/or parenchymal scarring.      2.  Mild cardiomegaly.      DX-TIBIA AND FIBULA RIGHT    (Results Pending)   DX-CHEST-PORTABLE (1 VIEW)    (Results Pending)       Patient Active Problem List   Diagnosis   • History of MI (myocardial infarction)   • Renal Transplant 9/10/2010 2nd to Polycystic Kidney Disease   • Stage 3b chronic kidney disease (HCC)   • Hypertension   • Dyslipidemia   • S/P insertion of non-drug eluting coronary artery stent   • History of atrial fibrillation   • CAD  (coronary artery disease)   • Type 2 diabetes mellitus with kidney complication, without long-term current use of insulin ([facility])   • Anemia of chronic disease   • Thrombocytopenia ([facility])   • GERD (gastroesophageal reflux disease)   • Polycystic kidney   • Gout   • COVID-19   • Right hip pain   • Arthralgia   • Right upper quadrant abdominal pain   • Diabetes mellitus with coincident hypertension ([facility])   • Type 2 diabetes mellitus with hyperlipidemia ([facility])   • Overweight (BMI 25.0-29.9)   • Bronchitis   • PAF (paroxysmal atrial fibrillation) ([facility])   • Septic shock ([facility])       Assessment and Plan:  Septic shock (present on admission)  Assessment & Plan  This is Sepsis Present on admission  SIRS criteria identified on my evaluation include: Tachycardia, with heart rate greater than 90 BPM and Tachypnea, with respirations greater than 20 per minute  Source is likely cellulitis, still in process of working up meningitis (Urine and Abdominal work-up negative, pending LP studies)  Sepsis protocol initiated  Fluid resuscitation ordered per protocol  Crystalloid Fluid Administration  IV antibiotics as appropriate for source of sepsis  Dexamethasone given while results pending for meningitis work-up; narrow antibiotics upon culture specification  Follow up bcx as well  Wound cx  Admit to ICU as needs pressors for hemodynamic support  Reassessment: I have reassessed the patient's hemodynamic status    Altered Mental Status (present on admission)  Assessment & Plan  Likely in setting of infection  TSH pending  UA negative  CSF studies pending  Chest x-ray no explanation for his ams  No acute CT head abnormalities  B12, folate  Refer to septis work-up above  Swallow eval to determine diet    Hyperlipidemia (present on admission)  Assessment & Plan  History of  Continue home meds    Diabetes mellitus type 2 (present on admission)  Assessment & Plan  A1c ordered  SSI  Hypoglycemia protocol    Atrial Fibrillation (present on  admission)  Assessment & Plan  Home beta-blocker   not on anticoagulant, discuss risk versus benefits     Ground-level fall (present on admission)  Assessment & Plan  No acute trauma on CT imaging  Surgery assessed and signed off

## 2022-05-24 NOTE — RESPIRATORY CARE
Respiratory Trauma Red Note    Pt placed on 15L NRB. SPO2 90-92%. Pt taken to CT

## 2022-05-24 NOTE — H&P
TRAUMA HISTORY AND PHYSICAL    DATE OF SERVICE: 5/24/2022    ACTIVATION LEVEL: RED.     HISTORY OF PRESENT ILLNESS: The patient is a 66 year-old male who may have possibly, reportedly, but not for certain had a ground level fall at some point between today and this past week. Details regarding this are unclear.  The patient's daughter had concerns regarding his mental status after a phone conversation and called EMS.      The patient was triaged to [facility] [place] in accordance with established pre hospital protocols. An expeditious primary and secondary survey with required adjuncts was conducted. See Trauma Narrator for full details.    Upon arrival, the patient was tachycardic and hypotensive.  Given this, along with the report that the patient may have potentially fallen, he was upgraded to a trauma RED.      The patient was seen in the trauma room shortly after arrival.  His GCS is 13, less one each for verbal and eyes.  He is in atrial fibrillation with RVR based on bedside telemetry and a STAT EKG with a rate of 120-160.  The patient was able to vaguely tell us that he has a history of this, so cardioversion and bolus amiodarone were contraindicated.  There was a report that he takes Plavix.  He was given a 1L fluid bolus and low dose norepinephrine was commenced.  FAST was negative.  Chest and pelvis x-rays were without traumatic injury.  Some of the following history was obtained through Epic chart review.    PAST MEDICAL HISTORY:   Past Medical History:   Diagnosis Date   • Benign essential hypertension    • Hyperlipoproteinemia    • Hypertension     not on meds anymore   • Pain    • Polycystic kidney 9/10/10    RIGHT KIDNEY TRANSPLANT   • Sleep apnea    • Snoring          PAST SURGICAL HISTORY:   Past Surgical History:   Procedure Laterality Date   • KNEE MANIPULATION  2/16/2012    Performed by LATOYA CONNER at SURGERY [facility]   • KNEE UNICOMPARTMENTAL  12/23/2011    Performed  "by LATOYA CONNER at SURGERY [facility] ORS   • KNEE ARTHROSCOPY  12/23/2011    Performed by LATOYA CONNER at SURGERY [facility] ORS   • KNEE ARTHROSCOPY  5/3/2011    Performed by HANANE GOLDMAN at SURGERY SAME DAY [facility] ORS   • MENISCECTOMY, KNEE, MEDIAL  5/3/2011    Performed by HANANE GOLDMAN at SURGERY SAME DAY [facility] ORS   • OTHER  9/10/10    RIGHT KIDNEY TRANSPLANT   • KNEE ARTHROPLASTY TOTAL  1/12/07    RIGHT   • KNEE ARTHROSCOPY  4/10/06    RIGHT   • OTHER ORTHOPEDIC SURGERY  7/8/74    LEFT KNEE DEBRIDEMENT          ALLERGIES: Doxycycline       CURRENT MEDICATIONS:   Please refer to the medication reconciliation in [facility]    FAMILY HISTORY:   Unable to obtain due to patient condition    SOCIAL HISTORY:   Unable to obtain due to patient condition    REVIEW OF SYSTEMS:   Comprehensive review of systems is not able to be elicited from the patient secondary to the acuity of the clinical situation.    PHYSICAL EXAMINATION:   VITAL SIGNS:   · BP (!) 69/45   Pulse (!) 140   Temp 37.4 °C (99.3 °F) (Temporal)   Resp (!) 31   Ht 1.956 m (6' 5\")   Wt 107 kg (235 lb)   SpO2 92%     GENERAL:   · The patient is ill appearing    HEENT:  · HEAD: Atraumatic, normocephalic.    · EARS: The ear canals and tympanic membranes are normal.Normal pinna bilaterally.    · EYES:  The pupils are equal, round, and reactive to light bilaterally. .    · NOSE: No rhinorrhea.  The bilateral nares are clear.  · THROAT: Oral mucosa is moist.  The oropharynx are clear.    FACE:   · The midface and jaw are stable. No malocclusion is evident.    NECK:    · The cervical spine was examined utilizing spinal motion restriction.   · The cervical spine is supple and non tender. Normal range of motion. The trachea is midline. No significant abrasions, lacerations, contusions, punctures, or swelling. No crepitance..    CHEST:    · Inspection: Unlabored respirations, no intercostal retractions, paradoxical motion, or accessory muscle " use.  · Palpation:  The chest is nontender. The clavicles are non deformed bilaterally..  · Auscultation: clear to auscultation.    CARDIOVASCULAR:    · Auscultation: irregularly irregular.  · Peripheral Pulses: Normal.      ABDOMEN:    · Abdomen is soft, nontender, without organomegaly or masses.  Well healed RLQ oblique incision.  Transplanted kidney palpable in this location.      BACK/PELVIS:    · The thoracolumbar spine was examined utilizing spinal motion restriction.   · Inspection and palpation reveal no significant tenderness, swelling, or deformity in the thoracolumbar region.  · The pelvis is stable.    RECTAL:  Deferred    GENITOURINARY:  The patient has normal external reproductive anatomy.    EXTREMITIES:  · RIGHT ARM: Without deformities, wounds, lacerations, or excoriations.  Full passive and active range of motion without pain.  · LEFT ARM: Without deformities, wounds, lacerations, or excoriations.  Full passive and active range of motion without pain.  · RIGHT LEG: Without deformities, wounds, lacerations, or excoriations.  Full passive and active range of motion without pain.  · LEFT LEG: Without deformities, wounds, lacerations, or excoriations.  Full passive and active range of motion without pain.    NEUROLOGIC:    · Yoder Coma Scale (GCS) 13.   · Neurologic examination revealed no focal deficits noted.    SKIN:  · The skin is warm and flushed.    LABORATORY VALUES:   Recent Labs     05/24/22  1122   WBC 8.6   RBC 4.67*   HEMOGLOBIN 13.7*   HEMATOCRIT 43.9   MCV 94.0   MCH 29.3   MCHC 31.2*   RDW 50.0   PLATELETCT 119*   MPV 11.4     Recent Labs     05/24/22  1122   SODIUM 142   POTASSIUM 4.0   CHLORIDE 106   CO2 15*   GLUCOSE 192*   BUN 33*   CREATININE 2.77*   CALCIUM 8.9     Recent Labs     05/24/22  1122   ASTSGOT 24   ALTSGPT 22   TBILIRUBIN 0.9   ALKPHOSPHAT 75   GLOBULIN 1.7*   INR 1.16*     Recent Labs     05/24/22  1122   APTT 30.7   INR 1.16*        IMAGING:   DX-CHEST-PORTABLE (1  VIEW)   Final Result      Left internal jugular central venous access catheter terminates over a persistent left superior vena cava. No postprocedure visible pneumothorax.      The remainder is stable.      DX-TIBIA AND FIBULA RIGHT   Final Result      1. Right lower leg soft tissue swelling.   2. No acute fracture or subluxation.   3. Postsurgical changes of right total knee arthroplasty.      US-ABDOMEN F.A.S.T. LTD (FOR ED USE ONLY)   Final Result      No free fluid seen in all 4 quadrants.      Negative FAST scan.            CT-ABDOMEN-PELVIS WITH   Final Result      1. No acute posttraumatic findings in the abdomen or pelvis.   2. Bibasilar subsegmental atelectasis.   3. Right pelvic transplant kidney without hydronephrosis.   4. Polycystic left kidney.   5. Diverticulosis without diverticulitis. Normal appendix.      CT-CTA CHEST PULMONARY ARTERY W/ RECONS   Final Result      1.  No CT evidence of pulmonary emboli.      2.  Bibasilar atelectasis.      3.  No evidence of rib fracture.      4. Diffuse coronary artery calcification.      CT-HEAD W/O   Final Result      1.  Cerebral atrophy.      2.  Otherwise, Head CT without contrast within normal limits. No evidence of acute cerebral infarction, hemorrhage or mass lesion.         CT-CSPINE WITHOUT PLUS RECONS   Final Result      1.  Moderate osteoarthritic changes at the C6-7 level with disc space narrowing and marginal osteophytosis. Further there is moderate cervical spondylotic changes at this level.      2.  No evidence of cervical spine fracture and/or subluxation.      DX-PELVIS-1 OR 2 VIEWS   Final Result      1.  Unremarkable single AP view of the pelvis.      DX-CHEST-LIMITED (1 VIEW)   Final Result      1.  Curvilinear perihilar opacities likely atelectasis and/or parenchymal scarring.      2.  Mild cardiomegaly.          IMPRESSION AND PLAN:  1) Ground Level Fall:    No acute traumatic injuries could be identified after thorough clinical,  radiographic, and laboratory investigations.  Further management and resuscitation for the patient's hemodynamic instability will be deferred to Dr. Nuno.      -Trauma Surgery Signing Off    I independently reviewed pertinent clinical lab tests since admission and ordered additional follow up clinical lab tests.  I independently reviewed pertinent radiographic images and the radiologist's reports since admission and ordered additional follow up radiographic studies.  The details of the available patient records in [facility] (including laboratory tests, culture data, medications, imaging, and other pertinent diagnostic tests) and that information was utilized as warranted in today's medical decision making for this patient.  I personally evaluated the patient condition at bedside.    Care interventions include:   Review of interval medical and surgical history, current medications and outpatient medication reconciliation, interval imaging studies and radiologist interpretation and interval laboratory values.    Aggregated care time spent evaluating, reassessing, reviewing documentation, providing care, and managing this patient exclusive of procedures: 65 minutes  ____________________________________   MERCED Robledo / MOE     DD: 5/24/2022   DT: 1:17 PM

## 2022-05-24 NOTE — DISCHARGE PLANNING
Trauma Response    Referral: Trauma Red Response    Intervention: SW responded to trauma red.  Pt was BIB [facility] after fall with ALOC.  Pt was alert upon arrival.  Pts name is Jama Altman (: 1955).  SW obtained the following pt information: SW met with Pt's Dtr-in-law in the lobby she shared that Pt lives alone.  She stated that he call them about 5 am and told them that he wasn't feeling well.  When he wouldn't answer their return calls she went to his home. When she found him he appeared altered and it appeared that he had fallen and hit his head. Once Pt was settled in Red 5 this SW escorted the Pt's Son and Dtr-in-law back.     Son: Afshin Altman [phone]  Dtr-in-law: Senthil Altman [phone]    Plan: SW will continue to monitor and assist if needs arise.

## 2022-05-24 NOTE — CONSULTS
"Critical Care Medicine Faculty Consult Note    Consulted by: Dr Pepe griffin    Reason for consult: Septic shock    HPI: 66y M Hx of renal transplant 9/10/2010 for end stage polycystic kidney dz maintain on prednisone, tacrolimus and mcyophenolate, DM, chronic thrombocytopenia, CKD 3B, Pafib, bronchitis, Covid 19 11/2020. That on Sunday hit his shin. He since has been complaining of pain. This morning he called his younger brother. His brother went to his house found him altered. He was brought in by EMS as a trauma activation since he had AMS and bruising from his fall. CT head negative, CTA chest negative, CT abdomen negative, CT spine negative other then degenerative findings. He was in severe septic shock, started on pressors and fluid bolus. He was given broad spectrum microbials. His only compliant is his right leg. LP is currently pending and was traumatic. He would like to be full code.     Exam:ill appearing male with diffuse erythroderma, no photophobia, no meningismus, dry mucous membranes, bruising to left forehead and scratch to right frontal area, no strawberry tongue, lungs cta, heart irregular without murmurs, abdomen soft, ext without edema. Right lower shin with bruising and small old upside down \"U\" cut scabbed no fluctuance, no blistering, warmth to leg, no groin adenopathy, no anesthesia, tenderness with passive range of motion. Neuro he does awake and answer appropriately moves all exts.     A/P:    Severe septic shock likely related to severe soft tissue infection: Concerns for Toxic shock syndrome with his erythroderma  Empiric rx clinda + zosyn +linezolid if in shock consider/TSST continue clinda for 48-72hrs beyond shock period  Monitor need for surgical debridement/consultation: pain disproportionate to exam, pain/edema beyond erythema, anesthesia, crepitus, blistering/bullae, hyponatremia, rapid progression, gas on imaging or facial edema or enhancement (avoid using LRINEC score poor " sensitivity)  Consider Bx of tissue to rule out fasciitis/myositis or cut down looking for dishwater color fluid  Fluid therapy with resus and norepinephrine for map > 65    Chronic immunosuppression hold with severe life threatening sepsis:   Stress dose steroids for relative adrenal insufficiency    Encephalopathy: improved likely hypoperfusion and septic. Follow up LP and serial neuro exam.     Thrombocytopenia: chronic serial monitor monitor for bleeding, trend    SANKET on CKD: Hx of CKD s/p renal transplant, acute renal injury related to septic shock and ischemic atn s/p contrast die load, avoid nephrotoxins, check CPK, consider nephrology consultation, avoid hyperchloremic resus fluid.     Lactic acidosis: due to sepsis serial monitor.     DM: aggressive glucose control for better source of infection consider insulin gtt. HgA1C.     Patient remains in critical condition from septic shock and severe soft tissue skin infection and titration of norepinephrine gtt. Critical care time provided was 60 minutes. This excludes all separate billable procedures.     Please see [employer]/NP notes for additional documentation    Reuben Swartz MD  Critical Care Medicine

## 2022-05-24 NOTE — CONSULTS
[facility] ORTHO TRAUMA    Ortho Trauma Consult Note    Time Called: 1530  Time Arrived: 1622    The patient was seen at the request of ER/medicine    Assessment & Plan:  1) 66 y.o. type 2 diabetic, CKD, immunosuppressed male with concern for possible right lower extremity cellulitis.      Operative Plan: NO orthopedic surgical intervention planned at this time. From an orthopedic perspective, the right lower extremity is more consistent with cellulitis. No imaging/clinical signs or symptoms of necrotizing fasciitis.  WB Status: WBAT RLE  DVT PPx: per primary team  Continue IV antibiotics per direction infectious disease.  Pain control per primary team.  Recommend medicine team continue to look for etiology of patient's systemic issues.    Patient and family are in agreement with the above-mentioned plan; all questions were answered to their apparent satisfaction.    Subjective     Chief Complaint: right leg pain    History of Present Illness:  Jama Altman is a 66 y.o. male with a past medical history of type 2 diabetes mellitus, stage IIIb chronic kidney disease, atrial fibrillation,who presented to the emergency department after a ground-level fall. Family at bedside state that they went to his home to visit and he was able to walk to the door to get them , then walked to his bed. He complained of RLE pain . No c/o fever, chills, n/v, urinary complaints, nor headaches. Family member reports had an episode of loose fecal incontinence. The medicine and ER team are concerned for possible ortho etiology right leg. Patient reports small abrasion and wound over the right leg with some associated pain but reports it as tolerable.    Past Medical History:   Diagnosis Date   • Benign essential hypertension    • Hyperlipoproteinemia    • Hypertension     not on meds anymore   • Pain    • Polycystic kidney 9/10/10    RIGHT KIDNEY TRANSPLANT   • Sleep apnea    • Snoring        Past Surgical History:   Procedure Laterality Date   •  KNEE MANIPULATION  2/16/2012    Performed by LATOYA CONNER at SURGERY [facility] ORS   • KNEE UNICOMPARTMENTAL  12/23/2011    Performed by LATOYA CONNER at SURGERY [facility] ORS   • KNEE ARTHROSCOPY  12/23/2011    Performed by LATOYA CONNER at SURGERY [facility] ORS   • KNEE ARTHROSCOPY  5/3/2011    Performed by HANANE GOLDMAN at SURGERY SAME DAY [facility] ORS   • MENISCECTOMY, KNEE, MEDIAL  5/3/2011    Performed by HANANE GOLDMAN at SURGERY SAME DAY [facility] ORS   • OTHER  9/10/10    RIGHT KIDNEY TRANSPLANT   • KNEE ARTHROPLASTY TOTAL  1/12/07    RIGHT   • KNEE ARTHROSCOPY  4/10/06    RIGHT   • OTHER ORTHOPEDIC SURGERY  7/8/74    LEFT KNEE DEBRIDEMENT       Medications  No current facility-administered medications on file prior to encounter.     Current Outpatient Medications on File Prior to Encounter   Medication Sig Dispense Refill   • atorvastatin (LIPITOR) 80 MG tablet Take 1 Tablet by mouth at bedtime. 90 Tablet 3   • tadalafil (CIALIS) 5 MG tablet : TAKE 1 TABLETS 30 MINUTES BEFORE SEXUAL ACTIVITY, DO NOT EXCEED MORE THAN ONE DOSE A DAY (Patient taking differently: Take 5 mg by mouth as needed for Erectile Dysfunction. : TAKE 1 TABLETS 30 MINUTES BEFORE SEXUAL ACTIVITY, DO NOT EXCEED MORE THAN ONE DOSE A DAY) 15 Tablet 3   • TRADJENTA 5 MG Tab tablet Take 5 mg by mouth every day.     • omeprazole (PRILOSEC) 20 MG delayed-release capsule TAKE ONE CAPSULE BY MOUTH EVERY DAY (Patient taking differently: Take 20 mg by mouth every day.) 90 Capsule 3   • SITagliptin (JANUVIA) 50 MG Tab Take 1 Tablet by mouth every day. 90 Tablet 3   • glyBURIDE (DIABETA) 5 MG Tab Take 2 Tablets by mouth 2 times a day with meals. 360 Tablet 3   • gabapentin (NEURONTIN) 300 MG Cap TAKE ONE CAPSULE BY MOUTH FOUR TIMES DAILY (Patient taking differently: Take 300 mg by mouth 4 times a day. TAKE ONE CAPSULE BY MOUTH FOUR TIMES DAILY) 360 Capsule 1   • predniSONE (DELTASONE) 5 MG Tab Take 1 Tablet by mouth every day. Take with  "food 90 Tablet 1   • metoprolol tartrate (LOPRESSOR) 25 MG Tab Take 1 Tablet by mouth every day. 90 Tablet 1   • pioglitazone (ACTOS) 45 MG Tab Take 1 Tablet by mouth every day. 90 Tablet 1   • mycophenolate (CELLCEPT) 250 MG Cap Take 500 mg by mouth 2 times a day.     • tacrolimus (PROGRAF) 1 MG CAPS Take 1 mg by mouth 2 times a day.         Allergies  Doxycycline    ROS  Negative otherwise than mentioned in HPI.    History reviewed. No pertinent family history.    Social History     Socioeconomic History   • Marital status:    Tobacco Use   • Smoking status: Never Smoker   • Smokeless tobacco: Never Used   Vaping Use   • Vaping Use: Never used   Substance and Sexual Activity   • Alcohol use: No   • Drug use: No   • Sexual activity: Yes     Partners: Female       Objective     Current Vital Signs:  BP (!) 88/47   Pulse (!) 104   Temp 37.4 °C (99.3 °F) (Temporal)   Resp (!) 29   Ht 1.956 m (6' 5\")   Wt 107 kg (235 lb)   SpO2 91%   BMI 27.87 kg/m²     Physical Exam:  General: Awake, appropriate, cooperative, and in some respiratory acute distress  HEENT: Normocephalic, atraumatic  CV: Equal peripheral pulses  Resp: Equal chest rise bilaterally  GI: Abdomen soft, nontender, no rebound, no guarding  Neuro: Cranial nerves II-XII grossly intact  Psych: Alert and oriented x3  Extremities:   RLE: very mild TTP right leg, small 1cm x 1cm wound medial face of the tibia not associated with any muscle compartments, no pain out of proportion beyond the area of redness, no fluctuance, no drainage, NVI distally      Data Review:   Recent Labs     05/24/22  1122   WBC 8.6   RBC 4.67*   HEMOGLOBIN 13.7*   HEMATOCRIT 43.9   MCV 94.0   MCH 29.3   MCHC 31.2*   RDW 50.0   PLATELETCT 119*   MPV 11.4     Recent Labs     05/24/22  1122 05/24/22  1525   SODIUM 142  --    POTASSIUM 4.0  --    CHLORIDE 106  --    CO2 15*  --    GLUCOSE 192*  --    BUN 33*  --    CPKTOTAL  --  684*     Recent Labs     05/24/22  1122   APTT 30.7 "   INR 1.16*       Imaging: CT right lower extremity demonstrates no signs or concerns of necrotizing fasciitis. CT consistent with cellulitis anteromedial leg.    Nick Baker M.D.  Date: 5/24/2022  Time: 4:50 PM

## 2022-05-24 NOTE — ASSESSMENT & PLAN NOTE
Concerns for Toxic shock syndrome with his erythroderma  Empiric rx clinda + zosyn + linezolid transitioned to Zosyn guided by ID with his E Coli bacteremia. Monitor need for surgical debridement appreciate consultation  S/p sepsis protocol and aggressive fluid resuscitation  Continue to titrate vasopressor support to maintain MAP >65 (added vasopressin)  Tapering hydrocortisone off while reintroducing home prednisone  Added midodrine  ID consulted  Cultures remain negative  Wound care  Ordered PT OT speech after extubation

## 2022-05-24 NOTE — ED NOTES
Patient at home, fell this AM  Daughter reported patient having ALOC      Right shin had puncture wound, concerns of bleeding related to plavix   Patient roomed in hallway and was upgraded to trauma red r/t decreased mental status, hypotension and history of fall on plavix

## 2022-05-24 NOTE — ED TRIAGE NOTES
Pt to red 5 after trauma room and ct. Pt was bib ems c/o glf today. Pt was initially in blue 14H however started to have decreased loc and low bp. Pt was upgraded to a trauma red. Report received from harmony rn. Pt A&Ox4. Gcs15.

## 2022-05-25 ENCOUNTER — APPOINTMENT (OUTPATIENT)
Dept: CARDIOLOGY | Facility: MEDICAL CENTER | Age: 67
DRG: 870 | End: 2022-05-25
Attending: INTERNAL MEDICINE
Payer: MEDICARE

## 2022-05-25 ENCOUNTER — APPOINTMENT (OUTPATIENT)
Dept: RADIOLOGY | Facility: MEDICAL CENTER | Age: 67
DRG: 870 | End: 2022-05-25
Attending: INTERNAL MEDICINE
Payer: MEDICARE

## 2022-05-25 ENCOUNTER — APPOINTMENT (OUTPATIENT)
Dept: RADIOLOGY | Facility: MEDICAL CENTER | Age: 67
DRG: 870 | End: 2022-05-25
Attending: STUDENT IN AN ORGANIZED HEALTH CARE EDUCATION/TRAINING PROGRAM
Payer: MEDICARE

## 2022-05-25 LAB
ALBUMIN SERPL BCP-MCNC: 2.9 G/DL (ref 3.2–4.9)
ALBUMIN/GLOB SERPL: 1.6 G/DL
ALP SERPL-CCNC: 42 U/L (ref 30–99)
ALT SERPL-CCNC: 25 U/L (ref 2–50)
ANION GAP SERPL CALC-SCNC: 14 MMOL/L (ref 7–16)
ANISOCYTOSIS BLD QL SMEAR: ABNORMAL
AST SERPL-CCNC: 40 U/L (ref 12–45)
BASOPHILS # BLD AUTO: 0 % (ref 0–1.8)
BASOPHILS # BLD: 0 K/UL (ref 0–0.12)
BILIRUB SERPL-MCNC: 0.5 MG/DL (ref 0.1–1.5)
BUN SERPL-MCNC: 42 MG/DL (ref 8–22)
BURR CELLS BLD QL SMEAR: NORMAL
CA-I SERPL-SCNC: 1 MMOL/L (ref 1.1–1.3)
CALCIUM SERPL-MCNC: 7.4 MG/DL (ref 8.5–10.5)
CHLORIDE SERPL-SCNC: 103 MMOL/L (ref 96–112)
CO2 SERPL-SCNC: 21 MMOL/L (ref 20–33)
CREAT SERPL-MCNC: 4.25 MG/DL (ref 0.5–1.4)
DOHLE BOD BLD QL SMEAR: NORMAL
EOSINOPHIL # BLD AUTO: 0 K/UL (ref 0–0.51)
EOSINOPHIL NFR BLD: 0 % (ref 0–6.9)
ERYTHROCYTE [DISTWIDTH] IN BLOOD BY AUTOMATED COUNT: 52.9 FL (ref 35.9–50)
GFR SERPLBLD CREATININE-BSD FMLA CKD-EPI: 15 ML/MIN/1.73 M 2
GLOBULIN SER CALC-MCNC: 1.8 G/DL (ref 1.9–3.5)
GLUCOSE BLD STRIP.AUTO-MCNC: 125 MG/DL (ref 65–99)
GLUCOSE BLD STRIP.AUTO-MCNC: 171 MG/DL (ref 65–99)
GLUCOSE BLD STRIP.AUTO-MCNC: 189 MG/DL (ref 65–99)
GLUCOSE BLD STRIP.AUTO-MCNC: 211 MG/DL (ref 65–99)
GLUCOSE BLD STRIP.AUTO-MCNC: 266 MG/DL (ref 65–99)
GLUCOSE BLD STRIP.AUTO-MCNC: 90 MG/DL (ref 65–99)
GLUCOSE SERPL-MCNC: 250 MG/DL (ref 65–99)
HCT VFR BLD AUTO: 40.1 % (ref 42–52)
HGB BLD-MCNC: 12.3 G/DL (ref 14–18)
LACTATE BLD-SCNC: 3.1 MMOL/L (ref 0.5–2)
LACTATE BLD-SCNC: 3.6 MMOL/L (ref 0.5–2)
LV EJECT FRACT  99904: 35
LV EJECT FRACT MOD 2C 99903: 40.74
LV EJECT FRACT MOD 4C 99902: 52.28
LV EJECT FRACT MOD BP 99901: 38.5
LYMPHOCYTES # BLD AUTO: 0.58 K/UL (ref 1–4.8)
LYMPHOCYTES NFR BLD: 4.4 % (ref 22–41)
MANUAL DIFF BLD: ABNORMAL
MCH RBC QN AUTO: 29.3 PG (ref 27–33)
MCHC RBC AUTO-ENTMCNC: 30.7 G/DL (ref 33.7–35.3)
MCV RBC AUTO: 95.5 FL (ref 81.4–97.8)
METAMYELOCYTES NFR BLD MANUAL: 8 %
MONOCYTES # BLD AUTO: 0.34 K/UL (ref 0–0.85)
MONOCYTES NFR BLD AUTO: 2.6 % (ref 0–13.4)
MORPHOLOGY BLD-IMP: NORMAL
MYELOCYTES NFR BLD MANUAL: 7.1 %
NEUTROPHILS # BLD AUTO: 10.09 K/UL (ref 1.82–7.42)
NEUTROPHILS NFR BLD: 62.8 % (ref 44–72)
NEUTS BAND NFR BLD MANUAL: 14.2 % (ref 0–10)
NRBC # BLD AUTO: 0 K/UL
NRBC BLD-RTO: 0 /100 WBC
OVALOCYTES BLD QL SMEAR: NORMAL
PLATELET # BLD AUTO: 93 K/UL (ref 164–446)
PLATELET BLD QL SMEAR: NORMAL
PMV BLD AUTO: 11.4 FL (ref 9–12.9)
POIKILOCYTOSIS BLD QL SMEAR: NORMAL
POTASSIUM SERPL-SCNC: 4.6 MMOL/L (ref 3.6–5.5)
PROMYELOCYTES NFR BLD MANUAL: 0.9 %
PROT SERPL-MCNC: 4.7 G/DL (ref 6–8.2)
RBC # BLD AUTO: 4.2 M/UL (ref 4.7–6.1)
RBC BLD AUTO: PRESENT
SODIUM SERPL-SCNC: 138 MMOL/L (ref 135–145)
SPHEROCYTES BLD QL SMEAR: NORMAL
TOXIC GRANULES BLD QL SMEAR: SLIGHT
WBC # BLD AUTO: 13.1 K/UL (ref 4.8–10.8)

## 2022-05-25 PROCEDURE — 36556 INSERT NON-TUNNEL CV CATH: CPT

## 2022-05-25 PROCEDURE — 85025 COMPLETE CBC W/AUTO DIFF WBC: CPT

## 2022-05-25 PROCEDURE — 700111 HCHG RX REV CODE 636 W/ 250 OVERRIDE (IP): Performed by: INTERNAL MEDICINE

## 2022-05-25 PROCEDURE — 80197 ASSAY OF TACROLIMUS: CPT

## 2022-05-25 PROCEDURE — 99223 1ST HOSP IP/OBS HIGH 75: CPT | Performed by: INTERNAL MEDICINE

## 2022-05-25 PROCEDURE — 71045 X-RAY EXAM CHEST 1 VIEW: CPT

## 2022-05-25 PROCEDURE — 700105 HCHG RX REV CODE 258: Performed by: INTERNAL MEDICINE

## 2022-05-25 PROCEDURE — 02H633Z INSERTION OF INFUSION DEVICE INTO RIGHT ATRIUM, PERCUTANEOUS APPROACH: ICD-10-PCS | Performed by: INTERNAL MEDICINE

## 2022-05-25 PROCEDURE — 770022 HCHG ROOM/CARE - ICU (200)

## 2022-05-25 PROCEDURE — 700117 HCHG RX CONTRAST REV CODE 255: Performed by: INTERNAL MEDICINE

## 2022-05-25 PROCEDURE — 93306 TTE W/DOPPLER COMPLETE: CPT | Mod: 26 | Performed by: INTERNAL MEDICINE

## 2022-05-25 PROCEDURE — 99292 CRITICAL CARE ADDL 30 MIN: CPT | Mod: 25 | Performed by: INTERNAL MEDICINE

## 2022-05-25 PROCEDURE — 82330 ASSAY OF CALCIUM: CPT

## 2022-05-25 PROCEDURE — 97597 DBRDMT OPN WND 1ST 20 CM/<: CPT

## 2022-05-25 PROCEDURE — 82962 GLUCOSE BLOOD TEST: CPT

## 2022-05-25 PROCEDURE — 93306 TTE W/DOPPLER COMPLETE: CPT

## 2022-05-25 PROCEDURE — 700105 HCHG RX REV CODE 258: Performed by: STUDENT IN AN ORGANIZED HEALTH CARE EDUCATION/TRAINING PROGRAM

## 2022-05-25 PROCEDURE — 99232 SBSQ HOSP IP/OBS MODERATE 35: CPT | Performed by: ORTHOPAEDIC SURGERY

## 2022-05-25 PROCEDURE — 700101 HCHG RX REV CODE 250: Performed by: INTERNAL MEDICINE

## 2022-05-25 PROCEDURE — 83605 ASSAY OF LACTIC ACID: CPT

## 2022-05-25 PROCEDURE — 85007 BL SMEAR W/DIFF WBC COUNT: CPT

## 2022-05-25 PROCEDURE — 36556 INSERT NON-TUNNEL CV CATH: CPT | Mod: RT | Performed by: INTERNAL MEDICINE

## 2022-05-25 PROCEDURE — 700101 HCHG RX REV CODE 250: Performed by: STUDENT IN AN ORGANIZED HEALTH CARE EDUCATION/TRAINING PROGRAM

## 2022-05-25 PROCEDURE — 80053 COMPREHEN METABOLIC PANEL: CPT

## 2022-05-25 RX ORDER — FUROSEMIDE 10 MG/ML
80 INJECTION INTRAMUSCULAR; INTRAVENOUS ONCE
Status: COMPLETED | OUTPATIENT
Start: 2022-05-25 | End: 2022-05-25

## 2022-05-25 RX ORDER — FUROSEMIDE 10 MG/ML
100 INJECTION INTRAMUSCULAR; INTRAVENOUS
Status: DISCONTINUED | OUTPATIENT
Start: 2022-05-25 | End: 2022-05-26

## 2022-05-25 RX ORDER — DEXTROSE MONOHYDRATE 25 G/50ML
25 INJECTION, SOLUTION INTRAVENOUS
Status: DISCONTINUED | OUTPATIENT
Start: 2022-05-25 | End: 2022-05-26

## 2022-05-25 RX ORDER — SODIUM HYPOCHLORITE 1.25 MG/ML
SOLUTION TOPICAL 2 TIMES DAILY
Status: DISCONTINUED | OUTPATIENT
Start: 2022-05-26 | End: 2022-06-22 | Stop reason: HOSPADM

## 2022-05-25 RX ORDER — HEPARIN SODIUM 5000 [USP'U]/ML
5000 INJECTION, SOLUTION INTRAVENOUS; SUBCUTANEOUS EVERY 8 HOURS
Status: DISCONTINUED | OUTPATIENT
Start: 2022-05-25 | End: 2022-05-26

## 2022-05-25 RX ORDER — ECHINACEA PURPUREA EXTRACT 125 MG
2 TABLET ORAL
Status: DISCONTINUED | OUTPATIENT
Start: 2022-05-25 | End: 2022-06-22 | Stop reason: HOSPADM

## 2022-05-25 RX ADMIN — NOREPINEPHRINE BITARTRATE 14 MCG/MIN: 1 INJECTION, SOLUTION, CONCENTRATE INTRAVENOUS at 19:58

## 2022-05-25 RX ADMIN — SODIUM BICARBONATE: 84 INJECTION, SOLUTION INTRAVENOUS at 02:37

## 2022-05-25 RX ADMIN — FUROSEMIDE 80 MG: 10 INJECTION, SOLUTION INTRAMUSCULAR; INTRAVENOUS at 08:01

## 2022-05-25 RX ADMIN — PIPERACILLIN AND TAZOBACTAM 4.5 G: 4; .5 INJECTION, POWDER, FOR SOLUTION INTRAVENOUS at 05:43

## 2022-05-25 RX ADMIN — PIPERACILLIN AND TAZOBACTAM 4.5 G: 4; .5 INJECTION, POWDER, FOR SOLUTION INTRAVENOUS at 20:36

## 2022-05-25 RX ADMIN — LINEZOLID 600 MG: 600 INJECTION, SOLUTION INTRAVENOUS at 05:42

## 2022-05-25 RX ADMIN — HYDROCORTISONE SODIUM SUCCINATE 50 MG: 100 INJECTION, POWDER, FOR SOLUTION INTRAMUSCULAR; INTRAVENOUS at 18:00

## 2022-05-25 RX ADMIN — SODIUM CHLORIDE, POTASSIUM CHLORIDE, SODIUM LACTATE AND CALCIUM CHLORIDE 500 ML: 600; 310; 30; 20 INJECTION, SOLUTION INTRAVENOUS at 00:14

## 2022-05-25 RX ADMIN — PIPERACILLIN AND TAZOBACTAM 4.5 G: 4; .5 INJECTION, POWDER, FOR SOLUTION INTRAVENOUS at 12:51

## 2022-05-25 RX ADMIN — NOREPINEPHRINE BITARTRATE 14 MCG/MIN: 1 INJECTION, SOLUTION, CONCENTRATE INTRAVENOUS at 08:44

## 2022-05-25 RX ADMIN — HYDROCORTISONE SODIUM SUCCINATE 50 MG: 100 INJECTION, POWDER, FOR SOLUTION INTRAMUSCULAR; INTRAVENOUS at 12:51

## 2022-05-25 RX ADMIN — HEPARIN SODIUM 5000 UNITS: 5000 INJECTION, SOLUTION INTRAVENOUS; SUBCUTANEOUS at 13:21

## 2022-05-25 RX ADMIN — HYDROMORPHONE HYDROCHLORIDE 0.5 MG: 1 INJECTION, SOLUTION INTRAMUSCULAR; INTRAVENOUS; SUBCUTANEOUS at 12:50

## 2022-05-25 RX ADMIN — VASOPRESSIN 0.03 UNITS/MIN: 20 INJECTION, SOLUTION INTRAMUSCULAR; SUBCUTANEOUS at 17:01

## 2022-05-25 RX ADMIN — INSULIN HUMAN 2 UNITS: 100 INJECTION, SOLUTION PARENTERAL at 05:47

## 2022-05-25 RX ADMIN — HYDROCORTISONE SODIUM SUCCINATE 50 MG: 100 INJECTION, POWDER, FOR SOLUTION INTRAMUSCULAR; INTRAVENOUS at 05:43

## 2022-05-25 RX ADMIN — HEPARIN SODIUM 5000 UNITS: 5000 INJECTION, SOLUTION INTRAVENOUS; SUBCUTANEOUS at 22:03

## 2022-05-25 RX ADMIN — HUMAN ALBUMIN MICROSPHERES AND PERFLUTREN 3 ML: 10; .22 INJECTION, SOLUTION INTRAVENOUS at 13:33

## 2022-05-25 RX ADMIN — FUROSEMIDE 100 MG: 20 INJECTION, SOLUTION INTRAMUSCULAR; INTRAVENOUS at 16:30

## 2022-05-25 RX ADMIN — CLINDAMYCIN IN 5 PERCENT DEXTROSE 900 MG: 18 INJECTION, SOLUTION INTRAVENOUS at 05:43

## 2022-05-25 ASSESSMENT — PAIN DESCRIPTION - PAIN TYPE
TYPE: ACUTE PAIN

## 2022-05-25 ASSESSMENT — ENCOUNTER SYMPTOMS
NECK PAIN: 0
PHOTOPHOBIA: 0
TREMORS: 0
TINGLING: 0
BRUISES/BLEEDS EASILY: 0
DOUBLE VISION: 0
ABDOMINAL PAIN: 0
FEVER: 0
CHILLS: 0
NAUSEA: 0
HEMOPTYSIS: 0
SPUTUM PRODUCTION: 0
BACK PAIN: 0
SHORTNESS OF BREATH: 1
PALPITATIONS: 0

## 2022-05-25 ASSESSMENT — FIBROSIS 4 INDEX: FIB4 SCORE: 5.59

## 2022-05-25 ASSESSMENT — LIFESTYLE VARIABLES: SUBSTANCE_ABUSE: 0

## 2022-05-25 NOTE — CARE PLAN
Problem: Knowledge Deficit - Standard  Goal: Patient and family/care givers will demonstrate understanding of plan of care, disease process/condition, diagnostic tests and medications  Outcome: Progressing     Problem: Pain - Standard  Goal: Alleviation of pain or a reduction in pain to the patient’s comfort goal  Outcome: Progressing     Problem: Skin Integrity  Goal: Skin integrity is maintained or improved  Outcome: Progressing     The patient is Unstable - High likelihood or risk of patient condition declining or worsening         Progress made toward(s) clinical / shift goals:  Vasopressors titrated to maintain MAP > 65, labs and CT scan completed. Family updated about plan of care.     Patient is not progressing towards the following goals:

## 2022-05-25 NOTE — PROGRESS NOTES
"[facility] -  PROGRESS NOTE               Author: James Sheppard M.D. Date & Time: 5/25/2022  11:07 AM     HPI:  Patient is a 66 year old male with a PMHx of renal transplant 9/10/2010 for polycystic kidney disease, DM, thrombocytopenia, CKD3b, pafib, covid19 infection, who presents for AMS.  He was brought in activated trauma for fall, CT imaging has been negative, but was in severe septic shock started on pressors and give nfluid boluses.  On broad spectrum abx.  Currently complains of right leg pain but thinks its better.  Confirms his last dose of tacrolimus and cellcept was yesterday.  Currently on levophed    DAILY NEPHROLOGY SUMMARY:  5/24: Consult done  5/25: NAEO, no complaints, feels a bit better, family at bedside    REVIEW OF SYSTEMS:    10 point ROS reviewed and is as per HPI/daily summary or otherwise negative    PMH/PSH/SH/FH:   Reviewed and unchanged since admission note    CURRENT MEDICATIONS:   Reviewed from admission to present day    VS:  /60   Pulse (!) 109   Temp 37.4 °C (99.3 °F) (Temporal)   Resp 16   Ht 1.956 m (6' 5\")   Wt 107 kg (236 lb 1.8 oz)   SpO2 95%   BMI 28.00 kg/m²     Physical Exam  Nursing note reviewed.   Constitutional:       Appearance: He is ill-appearing.   Eyes:      General: No scleral icterus.  Cardiovascular:      Comments: No edema  Pulmonary:      Effort: Pulmonary effort is normal.   Abdominal:      General: There is no distension.   Musculoskeletal:         General: No deformity.   Skin:     Findings: Lesion (RLE with open ulcer) present. No rash.   Neurological:      Mental Status: He is alert.   Psychiatric:         Behavior: Behavior normal.       Fluids:  In: 50588.8 [I.V.:4172.1]  Out: 345     LABS:  Recent Labs     05/24/22  1122 05/24/22  1938 05/25/22  0215   SODIUM 142 139 138   POTASSIUM 4.0 4.6 4.6   CHLORIDE 106 106 103   CO2 15* 17* 21   GLUCOSE 192* 230* 250*   BUN 33* 37* 42*   CREATININE 2.77* 3.63* 4.25* "   CALCIUM 8.9 7.9* 7.4*       IMAGING:   All imaging reviewed from admission to present day    IMPRESSION:  # SANKET  - Etiology likely 2/2 ATN  - SCr rising  - Non-oliguric, but UOP dropping it seems  # CKD Stage 3b  - BCr ~ 1.5-1.9  - Etiology likely 2/2 HTN/DM  # AMS  - Etiology likely 2/2 hypoperfusion/sepsis  # DDKT  - Etiology 2/2 ADPKD  - XPL in 2010  - On Tac/MMF/Pred regimen  # Hypotension  - Etiology unclear  # RLE ulcer  - Hit his shin last weekend, could be source of his sepsis  # type 2 DM    PLAN:  - No indication for RRT at this time  - Daily labs  - Monitor I/O  - Maintain MAP > 65  - FU Cx's  - If Cx remains NGTD tomorrow and his VS stabilize may consider resuming CNI at least, MMF will be held for now  - Dose all meds per eGFR < 15

## 2022-05-25 NOTE — PROGRESS NOTES
Overnight progress note    Was called for low urine output, patient with ATN secondary to shock, with history of renal transplant nephrology following.  Patient with respiratory distress and signs of pulmonary edema requiring nonrebreather, did trial 500 cc bolus to see if response of blood pressure, heart rate, or urine output.  All stayed exactly the same after 500 bolus, as did respiratory status.  Unlikely hypovolemia because of worsening urine output and creatinine, more likely due to ATN, and with patient hypotensive will defer further fluids or diuresis to discussion with nephrology in a.m. they have been following closely.    Critical care time 15 minutes

## 2022-05-25 NOTE — PROGRESS NOTES
Patient admitted to S[facility] ED.  Son at bedside.     Patient laced on 15 L NRB Mask. Levophed infusing at 30 mcg/min.     2 RN skin check:   · Right anterior shin puncture wound, photo taken and wound consult placed   · Left forehead bruising and abrasion

## 2022-05-25 NOTE — WOUND TEAM
[facility]  Inpatient Services  Initial Wound and Skin Care Evaluation    Admission Date: 5/24/2022     Last order of IP CONSULT TO WOUND CARE was found on 5/24/2022 from Hospital Encounter on 5/24/2022     HPI, PMH, SH: Reviewed    Past Surgical History:   Procedure Laterality Date   • KNEE MANIPULATION  2/16/2012    [facility]   • KNEE UNICOMPARTMENTAL  12/23/2011    Performed by LATOYA CONNER at SURGERY [facility] ORS   • KNEE ARTHROSCOPY  12/23/2011    [facility]   • KNEE ARTHROSCOPY  5/3/2011    Performed by HANANE GOLDMAN at SURGERY SAME DAY [facility] ORS   • MENISCECTOMY, KNEE, MEDIAL  5/3/2011    Performed by HANANE GOLDMAN at SURGERY SAME DAY [facility] ORS   • OTHER  9/10/10    RIGHT KIDNEY TRANSPLANT   • KNEE ARTHROPLASTY TOTAL  1/12/07    RIGHT   • KNEE ARTHROSCOPY  4/10/06    RIGHT   • OTHER ORTHOPEDIC SURGERY  7/8/74    LEFT KNEE DEBRIDEMENT     Social History     Tobacco Use   • Smoking status: Never Smoker   • Smokeless tobacco: Never Used   Substance Use Topics   • Alcohol use: No     Chief Complaint   Patient presents with   • GLF     Diagnosis: Septic shock (HCC) [A41.9, R65.21]    Unit where seen by Wound Team: S114/00     WOUND CONSULT/FOLLOW UP RELATED TO:  RLE     WOUND HISTORY:  Patient presented to ED after ground level fall. Hx type 2 DM, stage III CKD, afib.+ AMS and RLE pain on admission. Pt admitted with sepsis secondary to bacteremia. Source is likely cellulitis. Wound to RLE likely from fall.     WOUND ASSESSMENT/LDA  Wound 05/25/22 Full Thickness Wound Pretibial Anterior Right (Active)   Wound Image   05/25/22 1045   Site Assessment Red;Segura;Tan 05/25/22 1045   Periwound Assessment Edema;Intact 05/25/22 1045   Margins Unattached edges;Undefined edges 05/25/22 1045   Closure Secondary intention 05/25/22 1045   Drainage Amount Large 05/25/22 1045   Drainage Description  Serosanguineous;Tan 05/25/22 1045   Treatments Cleansed;Irrigation;Site care;Compression 05/25/22 1045   Wound Cleansing Normal Saline Irrigation 05/25/22 1045   Periwound Protectant Skin Protectant Wipes to Periwound 05/25/22 1045   Dressing Cleansing/Solutions Normal Saline 05/25/22 1045   Dressing Options Plain Strip Packing;Dry Gauze;Hypafix Tape;Tubigrip 05/25/22 1045   Dressing Changed New 05/25/22 1045   Dressing Status Clean;Dry;Intact 05/25/22 1045   Dressing Change/Treatment Frequency Every Shift, and As Needed 05/25/22 1045   NEXT Dressing Change/Treatment Date 05/25/22 05/25/22 1045   NEXT Weekly Photo (Inpatient Only) 06/01/22 05/25/22 1045   Non-staged Wound Description Full thickness 05/25/22 1045   Wound Length (cm) 1.5 cm 05/25/22 1045   Wound Width (cm) 0.7 cm 05/25/22 1045   Wound Depth (cm) 0.4 cm 05/25/22 1045   Wound Surface Area (cm^2) 1.05 cm^2 05/25/22 1045   Wound Volume (cm^3) 0.42 cm^3 05/25/22 1045   Undermining (cm) 1.9 cm 05/25/22 1045   Undermining of Wound, 1st Location From 6 o'clock;To 7 o'clock 05/25/22 1045   Shape Oval 05/25/22 1045   Wound Odor None 05/25/22 1045   Pulses 1+;Right;DP 05/25/22 1045   Exposed Structures JUDITH 05/25/22 1045   WOUND NURSE ONLY - Time Spent with Patient (mins) 45 05/25/22 1045   Number of days: 0        Vascular:    MIRELLA:   No results found.    Lab Values:    Lab Results   Component Value Date/Time    WBC 13.1 (H) 05/25/2022 02:15 AM    RBC 4.20 (L) 05/25/2022 02:15 AM    HEMOGLOBIN 12.3 (L) 05/25/2022 02:15 AM    HEMATOCRIT 40.1 (L) 05/25/2022 02:15 AM    CREACTPROT 17.22 (H) 05/24/2022 07:38 PM    SEDRATEWES <1 08/19/2020 07:16 AM    HBA1C 10.7 (H) 05/24/2022 03:25 PM        Culture Results show:  No results found for this or any previous visit (from the past 720 hour(s)).    Pain Level/Medicated:  Intermittent pain during wound care       INTERVENTIONS BY WOUND TEAM:  Chart and images reviewed. Discussed with bedside RN. All areas of concern (based  "on picture review, LDA review and discussion with bedside RN) have been thoroughly assessed. Documentation of areas based on significant findings. This RN in to assess patient. Performed standard wound care which includes appropriate positioning, dressing removal and non-selective debridement. Pictures and measurements obtained weekly if/when required.  Preparation for Dressing removal: NA GABBY  Non-selectively Debrided with:  NS and gauze.  Sharp debridement: CSWD by Wound RN Cornelia  Nadeen wound: Cleansed with NS, Prepped with no sting  Primary Dressing: NS soaked 1/4\" plain strip packing with tail left outside of wound, dry gauze, hypafix  Secondary (Outer) Dressing: Tubi  E    Interdisciplinary consultation: Patient, Wound RN (Cornelia)    EVALUATION / RATIONALE FOR TREATMENT:  Most Recent Date:  5/25: RLE edematous and painful to touch. CMS inact. Small, detached nonviable tissue trimmed from distal aspect of wound. Large amount of tan drainage expressed from wound with continued oozing throughout wound care. Plain strip packing packed into undermining area. Dakin's ordered for future changes to chemically debride wound and manage bioburden. Compression applied to help reduce swelling. Wound Team to follow.     Goals: Steady decrease in wound area and depth weekly.    WOUND TEAM PLAN OF CARE ([X] for frequency of wound follow up,):   Nursing to follow orders written for wound care. Contact wound team if area fails to progress, deteriorates or with any questions/concerns  Dressing changes by wound team:                   Follow up 3 times weekly:                NPWT change 3 times weekly:     Follow up 1-2 times weekly:  X weekly    Follow up Bi-Monthly:                   Follow up as needed:     Other (explain):     NURSING PLAN OF CARE ORDERS (X):  Dressing changes: See Dressing Care orders: X  Skin care: See Skin Care orders:   RN Prevention Protocol:   Rectal tube care: See Rectal Tube Care orders:   Other " orders:    RSKIN:   CURRENTLY IN PLACE (X), APPLIED THIS VISIT (A), ORDERED (O):   Q shift Javad:  X  Q shift pressure point assessments:  X    Surface/Positioning   Pressure redistribution mattress            Low Airloss  X        Bariatric foam      Bariatric ORTEGA     Waffle cushion        Waffle Overlay          Reposition q 2 hours      TAPs Turning system     Z Kaleb Pillow     Offloading/Redistribution   Sacral Mepilex (Silicone dressing)     Heel Mepilex (Silicone dressing)         Heel float boots (Prevalon boot)             Float Heels off Bed with Pillows   X        Respiratory simple mask  Silicone O2 tubing         Gray Foam Ear protectors     Cannula fixation Device (Tender )          High flow offloading Clip    Elastic head band offloading device      Anchorfast                                                         Trach with Optifoam split foam             Containment/Moisture Prevention     Rectal tube or BMS    Purwick/Condom Cath        Epps Catheter    Barrier wipes           Barrier paste       Antifungal tx      Interdry        Mobilization JUDITH    Up to chair        Ambulate      PT/OT      Nutrition       Dietician        Diabetes Education      PO   X  TF     TPN     NPO   # days     Other        Anticipated discharge plans: TBD  LTACH:        SNF/Rehab:                  Home Health Care:           Outpatient Wound Center:            Self/Family Care:        Other:                  Vac Discharge Needs:   Not Applicable Pt not on a wound vac:  X     Regular Vac while inpatient, alternative dressing at DC:        Regular Vac in use and continued at DC:            Reg. Vac w/ Skin Sub/Biologic in use. Will need to be changed 2x wkly:      Veraflo Vac while inpatient, ok to transition to Regular Vac on Discharge:           Veraflo Vac while inpatient, will need to remain on Veraflo Vac upon discharge:

## 2022-05-25 NOTE — PROGRESS NOTES
[facility] Progress Note            Admit Date: 5/24/2022    Resident(s): Kayy Hopkins M.D.   Attending:  FRANCISCO MARKS/ Dr. Swartz    Patient ID:    Name:  Jama Altman   YOB: 1955  Age:  66 y.o.  male   MRN:  [id number]    Reason for interval visit  (Principal Problem)   Septic shock (HCC) .    Hospital Course (carried forward and updated):   Jama Altman is a 66 y.o. male with a past medical history of type 2 diabetes mellitus, stage IIIb chronic kidney disease, atrial fibrillation,who presented to the emergency department after a ground-level fall. Family at bedside state that they went to his home to visit and he was able to walk to the door to get them , then walked to his bed. He complained of RLE pain . No c/o fever, chills, n/v, urinary complaints, nor headaches. 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 with osteophytosis and spondylotic changes negative for fractures CTA chest negative, no rib fractures, imaging does note diffuse coronary artery calcification; bibasilar subsegmental atelectasis, right pelvic transplant kidney without hydronephrosis, polycystic left kidney, diverticulosis without diverticulitis noted fast ultrasound negative; Medications given in ED ceftriaxone Zosyn and vancomycin, lidocaine, NS bolus 1500, hydrocortisone succinate       Interval Events:  Still complaining of pain overnight  Blood pressure systolic in the 80s  Can order clear liquid  diet  Urinary output decreased, received overnight bolus but output has not improved  Follow-up nephro recommendations  Oxygen requirements have been increased up to 12 L, chest x-ray ordered, Lasix, echo  Encourage incentive spirometry  Blood cultures now growing gram-negative rods  Consulting infectious disease for antibiotic input  Discussed with his outside provider restarting tacrolimus tomorrow potentially        Consultants:  Critical Care  Infectious disease  Nephrology        Review of Systems   Constitutional: Negative for chills and fever.   HENT: Negative for ear pain and tinnitus.    Eyes: Negative for double vision and photophobia.   Respiratory: Positive for shortness of breath. Negative for hemoptysis and sputum production.    Cardiovascular: Negative for chest pain and palpitations.   Gastrointestinal: Negative for abdominal pain and nausea.   Genitourinary: Negative for frequency and urgency.   Musculoskeletal: Positive for joint pain. Negative for back pain and neck pain.   Skin: Negative for itching and rash.   Neurological: Negative for tingling and tremors.   Endo/Heme/Allergies: Negative for environmental allergies. Does not bruise/bleed easily.   Psychiatric/Behavioral: Negative for substance abuse and suicidal ideas.        Physical Exam  Constitutional:       General: He is not in acute distress.     Appearance: He is ill-appearing. He is not diaphoretic.   HENT:      Head: Normocephalic and atraumatic.   Eyes:      Conjunctiva/sclera: Conjunctivae normal.   Neck:      Thyroid: No thyromegaly.      Trachea: No tracheal deviation.   Cardiovascular:      Rate and Rhythm: Regular rhythm.      Heart sounds: Normal heart sounds. No murmur heard.    No friction rub.   Pulmonary:      Effort: Respiratory distress present.      Breath sounds: No wheezing or rales.   Abdominal:      General: There is no distension.      Tenderness: There is no abdominal tenderness.   Genitourinary:     Penis: No  discharge.       Rectum: Guaiac result negative.   Musculoskeletal:         General: No tenderness or deformity.      Cervical back: Normal range of motion and neck supple.   Skin:     Findings: No erythema or rash.   Neurological:      Cranial Nerves: No cranial nerve deficit.      Coordination: Coordination normal.          Vitals:    05/25/22 0700 05/25/22 0715 05/25/22 0730 05/25/22 0745   BP: (!) 95/53 (!) 96/53 108/62    Pulse: (!) 109 (!) 106 (!) 104 (!) 109   Resp: (!) 27 (!) 32 (!) 33 (!) 31   Temp:       TempSrc:       SpO2: 93% 95% 95% 94%   Weight:       Height:          Recent Labs     05/24/22  1122 05/24/22 1938 05/25/22  0215   WBC 8.6 12.4* 13.1*   RBC 4.67* 4.31* 4.20*   HEMOGLOBIN 13.7* 12.8* 12.3*   HEMATOCRIT 43.9 40.7* 40.1*   MCV 94.0 94.4 95.5   MCH 29.3 29.7 29.3   RDW 50.0 50.5* 52.9*   PLATELETCT 119* 112* 93*   MPV 11.4 11.3 11.4   NEUTSPOLYS  --  87.00* 62.80   LYMPHOCYTES  --  1.70* 4.40*   MONOCYTES  --  0.90 2.60   EOSINOPHILS  --  0.00 0.00   BASOPHILS  --  0.00 0.00   RBCMORPHOLO  --  Present Present       Recent Labs     05/24/22  1122 05/24/22  1525 05/24/22 1938 05/25/22  0215   SODIUM 142  --  139 138   POTASSIUM 4.0  --  4.6 4.6   CHLORIDE 106  --  106 103   CO2 15*  --  17* 21   GLUCOSE 192*  --  230* 250*   BUN 33*  --  37* 42*   CPKTOTAL  --  684*  --   --        Assessment and Plan:  Septic shock secondary to bacteremia (present on admission)  Assessment & Plan  This is Sepsis Present on admission  SIRS criteria identified on my evaluation include: Tachycardia, with heart rate greater than 90 BPM and Tachypnea, with respirations greater than 20 per minute  Source is likely cellulitis from right lower extremity wound although not definitive (of note is ortho surgery stated also more consistent with cellulitis, not necrotizing fasciitis)  Sepsis protocol initiated  Fluid resuscitation ordered per protocol  Crystalloid Fluid Administration  IV antibiotics as appropriate for  source of sepsis, consulting ID  Hydrocortisone on board   Follow up bcx as well, thus far gram negative rods  Requires level of care of ICU as needs pressors for hemodynamic support  Reassessment: I have reassessed the patient's hemodynamic status  Follow-up echo      Acute tubular necrosis (present on admission)  Assessment & Plan  ATN has occurred in the setting of shock, fluid resuscitation has not improved his urine output   Tacrolimus and CellCept at the time held (on these in the setting of renal transplant in 2010 for polycystic kidney)    Lactic acidosis (present on admission)  Assessment & Plan  Bicarb drip running  CTM labs    Altered Mental Status (present on admission)  Assessment & Plan  Likely in setting of sepsis  TSH pending  UA negative  CSF studies culture no growth thus far, RBC count 3000 glucose elevated at 111 protein elevated at 78, viral panel negative, gram stain rare WBCs chest x-ray no explanation for his ams  No acute CT head abnormalities  Refer to septis work-up above      Pulmonary edema (present on admission)  Assessment & Plan  Recent x-ray noting pulmonary edema and/or infiltrates, stable since prior, and bilateral pleural effusions  Incentive spirometry encouraged   Supplemental O2 to >90-92 saturation  Follow-up echo    Hyperlipidemia (present on admission)  Assessment & Plan  History of  Continue home meds     Diabetes mellitus type 2 (present on admission)  Assessment & Plan  A1c ordered  SSI  Hypoglycemia protocol     Atrial Fibrillation (present on admission)  Assessment & Plan  Home beta-blocker   not on anticoagulant, discuss risk versus benefits      Ground-level fall (present on admission)  Assessment & Plan  No acute trauma on CT imaging   CPK elevated at 684 on admission  Surgery assessed and signed off  Caution with volume resuscitation given fluid overload

## 2022-05-25 NOTE — PROGRESS NOTES
Central line removed by patient. Essential medications and vasopressors moved to peripheral access. Patient agitated and restless. Vital signs stable, MAP >65, Dr. Swartz notified.

## 2022-05-25 NOTE — CARE PLAN
The patient is Watcher - Medium risk of patient condition declining or worsening    Shift Goals  Clinical Goals: hemodynamic stability  Patient Goals: comfort , rest  Family Goals: no family present    Progress made toward(s) clinical / shift goals:    Problem: Knowledge Deficit - Standard  Goal: Patient and family/care givers will demonstrate understanding of plan of care, disease process/condition, diagnostic tests and medications  Outcome: Progressing     Problem: Pain - Standard  Goal: Alleviation of pain or a reduction in pain to the patient’s comfort goal  Outcome: Progressing     Problem: Skin Integrity  Goal: Skin integrity is maintained or improved  Outcome: Progressing     Problem: Fall Risk  Goal: Patient will remain free from falls  Outcome: Progressing

## 2022-05-25 NOTE — CONSULTS
INFECTIOUS DISEASES INPATIENT CONSULT NOTE     Date of Service: 5/25/2022    Consult Requested By: Reuben Swartz M.D.    Reason for Consultation: Septic shock, gram-negative bacteremia    History of Present Illness:   Jama Altman is a 66 y.o. man with history of uncontrolled type 2 diabetes mellitus, history of renal transplant on chronic immunosuppression, stage III chronic kidney disease, right total knee arthroplasty, and atrial fibrillation on Plavix  admitted 5/24/2022 secondary to a ground-level fall and altered mentation. Per review of the notes, family went to his home and noted that he was complaining of right lower extremity pain as well as an episode of fecal incontinence.  He was also noted to be altered.  Patient states that on Sunday prior to admission he fell and hit his right shin and since that had been complaining of pain.  Prior to falling, patient states that he was feeling in his usual state of health.  He denied any lightheadedness, dizziness, fevers, chills, nausea, vomiting, diarrhea or any dysuria prior to his fall.  On presentation he was afebrile with a normal white count of 8.6 and hypotensive with a blood pressure of 71/48.  Lactic acid was elevated at 9.8 and he was found to be in acute kidney injury on chronic kidney disease.  CTA chest was negative for any pulmonary embolism and no evidence of rib fracture..  CT of the head also unremarkable.  CT of the cervical spine showed no evidence of cervical spine fracture or subluxation.  CT of the abdomen and pelvis unremarkable.  X-ray of the right leg shows soft tissue swelling but no acute fracture.  CT of the right lower extremity without contrast revealed no evidence of acute osteomyelitis in the foot or lower leg however there was some soft tissue attenuation in the subcutaneous tissues of the mid to distal lower leg and foot suspicious for cellulitis.  Patient was admitted for septic shock and transferred to the ICU on  pressors.  He remains on Levophed and vasopressin.  Blood cultures are now positive for gram-negative rods.  Given his altered mentation, he underwent a lumbar puncture with CSF analysis revealing 2 WBCs, lymphocyte predominance, elevated glucose at 111 and elevated protein at 78.  CSF culture is currently negative to date.  Patient is currently on Zyvox, clindamycin and Zosyn.  Infectious disease service consulted for antibiotic recommendations.      All other review of systems reviewed and negative except those documented above in the HPI.     PMH:   Past Medical History:   Diagnosis Date   • Benign essential hypertension    • Hyperlipoproteinemia    • Hypertension     not on meds anymore   • Pain    • Polycystic kidney 9/10/10    RIGHT KIDNEY TRANSPLANT   • Sleep apnea    • Snoring        PSH:  Past Surgical History:   Procedure Laterality Date   • KNEE MANIPULATION  2/16/2012    Performed by LATOYA CONNER at SURGERY [facility] ORS   • KNEE UNICOMPARTMENTAL  12/23/2011    Performed by LATOYA CONNER at SURGERY [facility] ORS   • KNEE ARTHROSCOPY  12/23/2011    Performed by LATOYA CONNER at SURGERY [facility] ORS   • KNEE ARTHROSCOPY  5/3/2011    Performed by HANANE GOLDMAN at SURGERY SAME DAY [facility] ORS   • MENISCECTOMY, KNEE, MEDIAL  5/3/2011    Performed by HANANE GOLDMAN at SURGERY SAME DAY [facility] ORS   • OTHER  9/10/10    RIGHT KIDNEY TRANSPLANT   • KNEE ARTHROPLASTY TOTAL  1/12/07    RIGHT   • KNEE ARTHROSCOPY  4/10/06    RIGHT   • OTHER ORTHOPEDIC SURGERY  7/8/74    LEFT KNEE DEBRIDEMENT       FAMILY HX:  Reviewed and not pertinent to the patient's clinical presentation    SOCIAL HX:  Social History     Socioeconomic History   • Marital status:      Spouse name: Not on file   • Number of children: Not on file   • Years of education: Not on file   • Highest education level: Not on file   Occupational History   • Not on file   Tobacco Use   • Smoking status: Never Smoker   • Smokeless  tobacco: Never Used   Vaping Use   • Vaping Use: Never used   Substance and Sexual Activity   • Alcohol use: No   • Drug use: No   • Sexual activity: Yes     Partners: Female   Other Topics Concern   • Not on file   Social History Narrative   • Not on file     Social Determinants of Health     Financial Resource Strain: Not on file   Food Insecurity: Not on file   Transportation Needs: Not on file   Physical Activity: Not on file   Stress: Not on file   Social Connections: Not on file   Intimate Partner Violence: Not on file   Housing Stability: Not on file     Social History     Tobacco Use   Smoking Status Never Smoker   Smokeless Tobacco Never Used     Social History     Substance and Sexual Activity   Alcohol Use No       Allergies/Intolerances:  Allergies   Allergen Reactions   • Doxycycline Rash     Sweats and shakes: 9/28/17: Clarified allergy with patient. Allergy was in 1998 and he doesn't remember what happened. He thought the medication is for pain.  Tolerates doxycycline 9/2017       History reviewed with the patient     Other Current Medications:    Current Facility-Administered Medications:   •  sodium chloride (OCEAN) 0.65 % nasal spray 2 Spray, 2 Spray, Nasal, Q2HRS PRN, Kayy Hopkins M.D.  •  insulin regular (HumuLIN R,NovoLIN R) injection, 3-14 Units, Subcutaneous, 4X/DAY ACHS **AND** POC blood glucose manual result, , , Q AC AND BEDTIME(S) **AND** NOTIFY MD and PharmD, , , Once **AND** Administer 20 grams of glucose (approximately 8 ounces of fruit juice) every 15 minutes PRN FSBG less than 70 mg/dL, , , PRN **AND** dextrose 50% (D50W) injection 25 g, 25 g, Intravenous, Q15 MIN PRN, Reuben Swartz M.D.  •  norepinephrine (Levophed) 8 mg in 250 mL NS infusion (premix), 0-30 mcg/min, Intravenous, Continuous, Reuben Swartz M.D., Last Rate: 26.3 mL/hr at 05/25/22 0844, 14 mcg/min at 05/25/22 0844  •  piperacillin-tazobactam (ZOSYN) 4.5 g in  mL IVPB, 4.5 g, Intravenous, Q8HRS, Reuben  "NADEGE Swartz M.D., Last Rate: 25 mL/hr at 22, 4.5 g at 22  •  clindamycin (CLEOCIN) IVPB premix 900 mg, 900 mg, Intravenous, Q8HRS, Reuben Swartz M.D., Stopped at 22 0643  •  Linezolid (ZYVOX) premix 600 mg, 600 mg, Intravenous, Q12HRS, Reuben Swartz M.D., Stopped at 22 0642  •  vasopressin (VASOSTRICT) 20 Units in  mL Infusion, 0.03 Units/min, Intravenous, Continuous, Reuben Swartz M.D., Last Rate: 9 mL/hr at 22, 0.03 Units/min at 22  •  hydrocortisone sodium succinate PF (Solu-CORTEF) 100 MG injection 50 mg, 50 mg, Intravenous, Q6HRS, Reuben Swartz M.D., 50 mg at 22  •  HYDROmorphone (Dilaudid) injection 0.5 mg, 0.5 mg, Intravenous, Q2HRS PRN, Tracey Whitfield M.D., 0.5 mg at 22  [unfilled]    Most Recent Vital Signs:  /62   Pulse (!) 109   Temp 37.4 °C (99.3 °F) (Temporal)   Resp (!) 31   Ht 1.956 m (6' 5\")   Wt 107 kg (236 lb 1.8 oz)   SpO2 94%   BMI 28.00 kg/m²   Temp  Av.4 °C (99.3 °F)  Min: 37.4 °C (99.3 °F)  Max: 37.4 °C (99.3 °F)    Physical Exam:  General: Ill-appearing, well nourished, no diaphoresis, no acute distress  HEENT: sclera anicteric, PERRL, extraocular muscles intact, mucous membranes moist, oropharynx clear and moist, no oral lesions or exudate, bruising on left side of forehead and maxilla  Neck: supple, no lymphadenopathy, right IJ catheter in place  Chest: CTAB, no rales, rhonchi or wheezes, normal work of breathing.  Cardiac: Tachycardic, irregularly irregular rhythm, normal S1 S2, no murmurs, rubs or gallops  Abdomen: + bowel sounds, soft, non-tender, non-distended, no hepatosplenomegaly  Extremities: There is a 1 cm superficial abrasion on the anterior aspect of the right lower extremity with serosanguineous drainage.  There are some surrounding mottling of the skin and edema.  Right knee surgical scar clean  Skin: warm and dry, no rashes or worrisome " "lesions  Neuro: Alert and oriented times 3, non-focal exam, speech fluent, full range of motion to bilateral upper and lower extremities  Psych: normal mood and behavior, pleasant; memory intact, normal judgement    Pertinent Lab Results:  Recent Labs     05/24/22 1122 05/24/22 1938 05/25/22 0215   WBC 8.6 12.4* 13.1*      Recent Labs     05/24/22 1122 05/24/22 1938 05/25/22  0215   HEMOGLOBIN 13.7* 12.8* 12.3*   HEMATOCRIT 43.9 40.7* 40.1*   MCV 94.0 94.4 95.5   MCH 29.3 29.7 29.3   ANISOCYTOSIS  --   --  1+   PLATELETCT 119* 112* 93*         Recent Labs     05/24/22 1122 05/24/22 1938 05/25/22 0215   SODIUM 142 139 138   POTASSIUM 4.0 4.6 4.6   CHLORIDE 106 106 103   CO2 15* 17* 21   CREATININE 2.77* 3.63* 4.25*        Recent Labs     05/24/22 1122 05/25/22 0215   ALBUMIN 3.7 2.9*        Pertinent Micro:  Results     Procedure Component Value Units Date/Time    CSF CULTURE [[id number]] Collected: 05/24/22 1325    Order Status: Completed Specimen: CSF from Tap Updated: 05/25/22 0909     Significant Indicator NEG     Source CSF     Site TAP     Culture Result No growth at 24 hours.     Gram Stain Result Rare WBCs.  No organisms seen.      BLOOD CULTURE x2 [[id number]]  (Abnormal) Collected: 05/24/22 1124    Order Status: Completed Specimen: Blood from Peripheral Updated: 05/24/22 2331     Significant Indicator POS     Source BLD     Site PERIPHERAL     Culture Result Growth detected by Bactec instrument. 05/24/2022  23:30  Gram Stain: Gram negative rods.      Narrative:      CALL  [place]  19 tel. [phone],  CALLED  19 tel. [phone] 05/24/2022, 23:31, RB PERF. RESULTS CALLED TO:  [id number]  Per Hospital Policy: Only change Specimen Src: to \"Line\" if  specified by physician order.  Right Hand    BLOOD CULTURE x2 [[id number]]  (Abnormal) Collected: 05/24/22 1122    Order Status: Completed Specimen: Blood from Peripheral Updated: 05/24/22 2330     Significant Indicator POS     Source BLD     Site PERIPHERAL     " "Culture Result Growth detected by Bactec instrument. 05/24/2022  23:30  Gram Stain: Gram negative rods.      Narrative:      Per Hospital Policy: Only change Specimen Src: to \"Line\" if  specified by physician order.  Left Hand    GRAM STAIN [[id number]] Collected: 05/24/22 1325    Order Status: Completed Specimen: CSF Updated: 05/24/22 1545     Significant Indicator .     Source CSF     Site TAP     Gram Stain Result Rare WBCs.  No organisms seen.      BLOOD CULTURE x2 [[id number]]     Order Status: Sent Specimen: Blood from Peripheral     CULTURE WOUND W/ GRAM STAIN [[id number]]     Order Status: Canceled Specimen: Wound from Right Leg     URINALYSIS,CULTURE IF INDICATED [[id number]]  (Abnormal) Collected: 05/24/22 1245    Order Status: Sent Specimen: Urine, Cath Updated: 05/24/22 1323     Color Yellow     Character Clear     Specific Gravity 1.017     Ph 5.0     Glucose 500 mg/dL      Ketones Negative mg/dL      Protein Negative mg/dL      Bilirubin Negative     Urobilinogen, Urine 0.2     Nitrite Negative     Leukocyte Esterase Negative     Occult Blood Negative     Micro Urine Req see below     Comment: Microscopic examination not performed when specimen is clear  and chemically negative for protein, blood, leukocyte esterase  and nitrite.         Narrative:      Indication for culture:->Acute unexplained altered mental  status ONLY after ruling out other recognized cause    COV-2, FLU A/B, AND RSV BY PCR (2-4 HOURS CEPHEID): Collect NP swab in VTM [[id number]] Collected: 05/24/22 1130    Order Status: Completed Specimen: Respirate Updated: 05/24/22 1243     Influenza virus A RNA Negative     Influenza virus B, PCR Negative     RSV, PCR Negative     SARS-CoV-2 by PCR NotDetected     Comment: PATIENTS: Important information regarding your results and instructions can  be found at [email or web address]   \"After your  Covid-19 Test\"    [facility] providers: PLEASE REFER TO DE-ESCALATION AND " RETESTING PROTOCOL  on [email or web address]    **The [employer] GeneXpert Xpress SARS-CoV-2 RT-PCR Test has been made  available for use under the Emergency Use Authorization (EUA) only.          SARS-CoV-2 Source NP Swab        Blood Culture   Date Value Ref Range Status   09/27/2017 No growth after 5 days of incubation.  Final     Blood Culture Hold   Date Value Ref Range Status   09/27/2017 Collected  Final        Studies:  CT-CSPINE WITHOUT PLUS RECONS    Result Date: 5/24/2022 5/24/2022 11:38 AM HISTORY/REASON FOR EXAM: Fall and neck pain TECHNIQUE/EXAM DESCRIPTION: CT cervical spine without contrast, with reconstructions. The study was performed on a helical multidetector CT scanner. Thin-section helical scanning was performed from the skull base through T1. Sagittal and coronal multiplanar reconstructions were generated from the axial images. Low dose optimization technique was utilized for this CT exam including automated exposure control and adjustment of the mA and/or kV according to patient size. COMPARISON:  None. FINDINGS: Alignment in the cervical spine is normal. There is no fracture or dislocation. The craniovertebral junction appears intact. The prevertebral and paraspinous soft tissues are unremarkable. There is moderate disc space. C6-7 level with marginal osteophytosis and moderate posterior spurring. The superior mediastinum and lung apices in the field of view are unremarkable.     1.  Moderate osteoarthritic changes at the C6-7 level with disc space narrowing and marginal osteophytosis. Further there is moderate cervical spondylotic changes at this level. 2.  No evidence of cervical spine fracture and/or subluxation.    CT-ABDOMEN-PELVIS WITH    Result Date: 5/24/2022 5/24/2022 11:39 AM HISTORY/REASON FOR EXAM:  trauma red. TECHNIQUE/EXAM DESCRIPTION:   CT scan of the abdomen and pelvis with contrast. Contrast-enhanced helical scanning was obtained from the diaphragmatic domes through the pubic  symphysis following the bolus administration of nonionic contrast without complication. 75 mL of Omnipaque 350 nonionic contrast was administered without complication. Low dose optimization technique was utilized for this CT exam including automated exposure control and adjustment of the mA and/or kV according to patient size. COMPARISON: Complete abdominal sonogram, 9/14/2021. FINDINGS: Lower Chest: Dependent atelectasis in the lung bases. No pleural effusion or pneumothorax. No pericardial effusion. Liver: Normal. Spleen: Unremarkable. Pancreas: Unremarkable. Gallbladder: No calcified stones. Biliary: Nondilated. Adrenal glands: Normal. Kidneys: Absent right kidney. Right lower quadrant transplant kidney without hydronephrosis. Enlarged left kidney with 2 numerous to count fluid attenuation lesions throughout the cortex. Nonobstructing stones in the lower pole of the left kidney. No hydronephrosis or hydroureter. Bowel: Sigmoid colon diverticulosis, without diverticulitis. Normal appendix. Lymph nodes: No adenopathy. Vasculature: Calcified atherosclerotic plaques in the aorta and iliac arteries, without aneurysmal dilation. Peritoneum: Unremarkable without ascites. Musculoskeletal: No acute or destructive process. Multilevel lumbar spondylosis. Pelvis: The bladder is decompressed by an indwelling Epps catheter.. Small umbilical hernia containing fat.     1. No acute posttraumatic findings in the abdomen or pelvis. 2. Bibasilar subsegmental atelectasis. 3. Right pelvic transplant kidney without hydronephrosis. 4. Polycystic left kidney. 5. Diverticulosis without diverticulitis. Normal appendix.    CT-EXTREMITY, LOWER W/O RIGHT    Result Date: 5/24/2022 5/24/2022 4:17 PM HISTORY/REASON FOR EXAM:  Soft tissue infection suspected, lower leg, no prior imaging; necrotizing fasciitis to right lower ext, recieved large amounts of contrast already with renal transplant. TECHNIQUE/EXAM DESCRIPTION AND NUMBER OF VIEWS: CT  scan of the RIGHT lower extremity without contrast and including reconstructions. Thin-section noncontrast helical images were obtained. Coronal and sagittal reconstructions were generated from the axial images. Up to date radiation dose reduction adjustments have been utilized to meet ALARA standards for radiation dose reduction. COMPARISON: None. FINDINGS: Right total knee replacement. There is decreased bony mineralization of the right lower extremity. There is no evidence of ulceration or soft tissue mass in the right lower extremity. There is no evidence of large abscess formation. There is diffuse atherosclerotic calcification of the right superficial femoral artery. There are curvilinear regions of soft tissue attenuation throughout the subcutaneous tissues of the right mid and distal lower leg.     1.  Status post right total knee replacement. 2.  Diffuse atherosclerotic calcification of the arterial system of the right lower extremity suspicious for diabetes mellitus. 3.  Soft tissue attenuation in the subcutaneous tissues of the mid to distal lower leg and foot suspicious for cellulitis. 4.  No evidence of soft tissue ulceration in the right lower leg or foot. 5.  No evidence of acute osteomyelitis in the right lower leg or foot. 6.  Small subcutaneous abscesses cannot be excluded without the use of intravenous contrast.    CT-HEAD W/O    Result Date: 5/24/2022 5/24/2022 11:38 AM HISTORY/REASON FOR EXAM:  trauma red. TECHNIQUE/EXAM DESCRIPTION AND NUMBER OF VIEWS: CT of the head without contrast. The study was performed on a helical multidetector CT scanner. Contiguous axial sections were obtained from the skull base through the vertex. Up to date radiation dose reduction adjustments have been utilized to meet ALARA standards for radiation dose reduction. COMPARISON:  None available FINDINGS: The calvariae and skull base are unremarkable. There are no extraaxial fluid collections. There is a pattern of  cerebral atrophy manifest as enlargement of sulcal markings and ventricular prominence. The ventricular system and basal cisterns are otherwise unremarkable. There are no areas of abnormal density in the brain substance. There are no hemorrhagic lesions. There are no mass effects or shift of midline structures. The brainstem and posterior fossa structures are unremarkable. Paranasal sinuses in the field of view are unremarkable. Mastoids in the field of view are unremarkable.     1.  Cerebral atrophy. 2.  Otherwise, Head CT without contrast within normal limits. No evidence of acute cerebral infarction, hemorrhage or mass lesion.     DX-CHEST-LIMITED (1 VIEW)    Result Date: 5/25/2022 5/25/2022 7:52 AM HISTORY/REASON FOR EXAM:  Central line placement TECHNIQUE/EXAM DESCRIPTION AND NUMBER OF VIEWS: Single portable view of the chest. COMPARISON: Chest radiography, 5/25/2022 at 0719 hours FINDINGS: Lungs: Symmetric low lung volumes. Stable linear and patchy opacities are unchanged. Stable small left pleural effusion. The right costophrenic recess is sharp. No visible pneumothorax bilaterally. Mediastinum: Cardiac silhouette size remains enlarged. Other: The right internal jugular central venous access catheter placed in the interval terminates over the right atrium. The remaining visualized bones and soft tissues are stable radiographically.     1. Right internal jugular central venous access catheter placed in the interval terminates over the upper aspect of the right atrium. No postprocedure visible pneumothorax. 2. Stable patchy parenchymal opacities in the lungs, with a stable small left pleural effusion.    DX-CHEST-LIMITED (1 VIEW)    Result Date: 5/24/2022 5/24/2022 11:26 AM HISTORY/REASON FOR EXAM:  Chest Pain. TECHNIQUE/EXAM DESCRIPTION AND NUMBER OF VIEWS: Single AP view of the chest. COMPARISON:  Chest x-ray 11/13/2020 FINDINGS: Lungs:  There are curvilinear opacities in the lung fields bilaterally most  pronounced in the perihilar regions. Pleura:  There is no pleural effusion or pneumothorax. Heart and mediastinum:  The heart silhouette is mildly enlarged Bones:  Normal     1.  Curvilinear perihilar opacities likely atelectasis and/or parenchymal scarring. 2.  Mild cardiomegaly.    DX-CHEST-PORTABLE (1 VIEW)    Result Date: 5/25/2022 5/25/2022 7:12 AM HISTORY/REASON FOR EXAM: Shortness of Breath TECHNIQUE/EXAM DESCRIPTION:  Single AP view of the chest. COMPARISON: Yesterday FINDINGS: Cardiomegaly is observed. The mediastinal contour appears within normal limits.  The central  pulmonary vasculature appears prominent and indistinct. Bilateral lung volumes are diminished.  Diffuse scattered hazy pulmonary parenchymal opacities are seen. Blunting of bilateral costophrenic angles is seen, compatible with trace bilateral pleural effusions. The bony structures appear age-appropriate.     1.  Pulmonary edema and/or infiltrates are identified, which are stable since the prior exam. 2.  Trace bilateral pleural effusions 3.  Cardiomegaly    DX-CHEST-PORTABLE (1 VIEW)    Result Date: 5/24/2022 5/24/2022 1:00 PM HISTORY/REASON FOR EXAM:  Central line placement. TECHNIQUE/EXAM DESCRIPTION AND NUMBER OF VIEWS: Single portable view of the chest. COMPARISON: Chest radiograph, 5/24/2022 at 1147 hours FINDINGS: Lungs: Stable curvilinear opacities in the lung fields, greatest in the perihilar regions, stable when compared to prior study. Stable bibasal parenchymal volume loss. No large volume pleural effusion or visible pneumothorax. No pulmonary vascular congestion or interstitial edema. Mediastinum: The cardiac silhouette size remains enlarged. Other: The left internal jugular central venous access catheter placed in the interval terminates over a persistent left superior vena cava. The remaining visualized bones and soft tissues are stable radiographically.     Left internal jugular central venous access catheter terminates  over a persistent left superior vena cava. No postprocedure visible pneumothorax. The remainder is stable.    DX-PELVIS-1 OR 2 VIEWS    Result Date: 5/24/2022 5/24/2022 11:27 AM HISTORY/REASON FOR EXAM:  Pelvic/Hip Pain Following Trauma. TECHNIQUE/EXAM DESCRIPTION AND NUMBER OF VIEWS:  1 view(s) of the pelvis. COMPARISON:  None. FINDINGS: There is normal bony mineralization of the pelvis. There is no evidence of pelvic fracture or osseous lesion. The sacroiliac joints and pubic symphysis are symmetrical.     1.  Unremarkable single AP view of the pelvis.    DX-TIBIA AND FIBULA RIGHT    Result Date: 5/24/2022 5/24/2022 12:17 PM HISTORY/REASON FOR EXAM:  Pain/Deformity Following Trauma. TECHNIQUE/EXAM DESCRIPTION AND NUMBER OF VIEWS:  2 views of the RIGHT tibia and fibula. COMPARISON: Right knee radiography, 12/18/2006 FINDINGS: Bones: Postsurgical changes in the distal right femur and proximal right tibia. Normal bone mineralization. No focal bone lesion. No acute fracture. Joint: Postsurgical changes of right total knee arthroplasty. Ankle mortise is preserved. No subluxation. Soft tissue: Arteriosclerosis. Circumferential right lower leg soft tissue swelling.     1. Right lower leg soft tissue swelling. 2. No acute fracture or subluxation. 3. Postsurgical changes of right total knee arthroplasty.    CT-CTA CHEST PULMONARY ARTERY W/ RECONS    Result Date: 5/24/2022 5/24/2022 11:38 AM HISTORY/REASON FOR EXAM:  Fall and chest pain TECHNIQUE/EXAM DESCRIPTION: CT angiogram scan for pulmonary embolism with contrast, with reconstructions. 1.25 mm helical sections were obtained from the lung apices through the lung bases following the rapid bolus administration of 75 mL of Omnipaque 350 nonionic contrast. Thin-section overlapping reconstruction interval was utilized. Coronal reconstructions were generated from the axial data. MIP post processing was performed and utilized for the interpretation. Low dose optimization  technique was utilized for this CT exam including automated exposure control and adjustment of the mA and/or kV according to patient size. COMPARISON: None FINDINGS: Pulmonary Embolism: No. Main Pulmonary Arteries: No. Segmental branches: No. Subsegmental branches: No. Additional Comments: None. Lungs: There are curvilinear opacities dependently in the lung bases. Pleura: No pleural effusion. Nodes: No enlarged lymph nodes. Additional findings: Diffuse coronary artery calcification.     1.  No CT evidence of pulmonary emboli. 2.  Bibasilar atelectasis. 3.  No evidence of rib fracture. 4. Diffuse coronary artery calcification.    [employer] (FOR ED USE ONLY)    Result Date: 5/24/2022 5/24/2022 12:01 PM HISTORY/REASON FOR EXAM:  Ground-level fall with traumatic injury TECHNIQUE/EXAM DESCRIPTION AND NUMBER OF VIEWS:  Limited ultrasound of the abdomen. FAST scan. Focused ultrasound of the 4 quadrants of the abdomen. COMPARISON: None FINDINGS: Focused ultrasound of the 4 quadrants of the abdomen demonstrates no evidence of free fluid in the 4 quadrants.     No free fluid seen in all 4 quadrants. Negative FAST scan.       IMPRESSION:   1.  Septic shock    2.  Gram-negative bacteremia  3.  Right lower extremity cellulitis  4.  Rhabdomyolysis  5.  Acute kidney injury on stage III chronic kidney disease  6.  Acute encephalopathy, resolved  7.  Leukocytosis  8.  Thrombocytopenia   9.  History of renal transplant on tacrolimus and mycophenolate  10.  Uncontrolled type 2 diabetes mellitus, hemoglobin A1c 10.7  11.  History of right total knee arthroplasty  12.  Recent fall    PLAN:   Jama Altman is a 66 y.o. man with a history of uncontrolled type 2 diabetes mellitus, history of prior renal transplant on chronic immunosuppression, stage III chronic kidney disease, atrial fibrillation on anticoagulation, and prior right total knee arthroplasty admitted on 5/24/2022 secondary to altered mentation in the setting  of a ground-level fall.  Patient's mentation has now improved.  Patient found to be in septic shock with gram-negative bacteremia and right lower extremity cellulitis without any evidence of necrotizing fasciitis.  His right lower extremity cellulitis appears to be improving.    - Discontinue Zyvox and clindamycin  -Continue IV Zosyn, renally dosed for now  -Follow blood cultures on 5/24-gram-negative rods.  Follow speciation and susceptibilities  -Diabetes education and blood sugar control  -Avoid nephrotoxins  -Continue supportive care by primary team    Plan of care discussed with intensivist, Dr. Swartz and family at bedside.  Will continue to follow    Vivian Francois M.D.      Please note that this dictation was created using voice recognition software. I have worked with technical experts from [employer] [employer] to optimize the interface.  I have made every reasonable attempt to correct obvious errors, but there may be errors of grammar and possibly content that I did not discover before finalizing the note.

## 2022-05-25 NOTE — PROGRESS NOTES
[facility] Ortho Trauma  Orthopedic Trauma Progress Note     Subjective:  Nursing denies any acute events overnight. Patient is about to undergo central line placement by the medicine team. Patient recently reported chest pain and difficulty breathing.     Objective:  Physical Exam:  Vitals:    05/25/22 0745   BP:    Pulse: (!) 109   Resp: (!) 31   Temp:    SpO2: 94%     General: Awake, appropriate, cooperative, and in mild respiratory distress  Psych: Alert and oriented x3, good insight and judgement  MSK  Extremities:   RLE: unchanged exam from yesterday evening-very mild TTP right leg, small 1cm x 1cm wound medial face of the tibia not associated with any muscle compartments, no pain out of proportion beyond the area of redness, no fluctuance, no drainage, NVI distally      Data Review: CT right lower extremity demonstrates no signs or concerns of necrotizing fasciitis. CT consistent with cellulitis anteromedial leg.    Assessment & Plan:  1. 66 y.o. type 2 diabetic, CKD, immunosuppressed male with concern for possible right lower extremity cellulitis.    My colleague Dr. Arroyo will be by again to visit the patient this morning and monitor his progress. Dr. Holloway is the on call orthopaedic trauma surgeon who is in house if any questions or new concerns arise.    Operative Plan: NO orthopedic surgical intervention planned at this time. From an orthopedic perspective, the right lower extremity is more consistent with cellulitis. No imaging/clinical signs or symptoms of necrotizing fasciitis.  WB Status: WBAT RLE  DVT PPx: per primary team  Continue IV antibiotics per direction infectious disease.  Pain control per primary team.  Recommend medicine team continue to look for etiology of patient's systemic issues.    Nick Baker M.D.  Date: 5/25/2022  Time: 7:54 AM

## 2022-05-25 NOTE — PROGRESS NOTES
"Critical Care Medicine Faculty Progress Note    HPI: 66y M Hx of renal transplant 9/10/2010 for end stage polycystic kidney dz maintain on prednisone, tacrolimus and mcyophenolate, DM, chronic thrombocytopenia, CKD 3B, Pafib, bronchitis, Covid 19 11/2020. That on Sunday hit his shin. He since has been complaining of pain. This morning he called his younger brother. His brother went to his house found him altered. He was brought in by EMS as a trauma activation since he had AMS and bruising from his fall. CT head negative, CTA chest negative, CT abdomen negative, CT spine negative other then degenerative findings. He was in severe septic shock, started on pressors and fluid bolus. He was given broad spectrum microbials. His only compliant is his right leg. LP is currently pending and was traumatic. He would like to be full code.     Exam:More awake this morning, bruising to left forehead and scratch to right frontal area, lungs labored with increase respiratory distress crackles, heart irregular without murmurs, abdomen soft nttp, ext without edema. Right lower shin with bruising and small old upside down \"U\" cut scabbed no fluctuance, no blistering, warmth to leg, no groin adenopathy, no anesthesia, tenderness with passive range of motion. Neuro he does awake and answer appropriately moves all exts.     Rounding Report:  Neuro: aox4 moves all, rle pain  HR: 's  SBP: 80's on levophed 14, vaso 0.03  Tmax: 38.1  GI: NPO, clears today later advance, BM this am  UOP: poor 100ml 15ml/hr  Lines: right ij central, 2 peripheral, fernandez  Resp: 12l oxy mask, IS OOB to chair   Vte: heparin vte  PPI/H2:n/a  Antibx: Clinda, Zosyn, Zyvox     D/c bicarb gtt volume overload consider lasix trial 80mg IV  Echo  Pulmonary hygiene, IS mobilize up to chair  Immunosuppression meds  Insulin coverage  CXR  Central line  iCal  Consider ID consult    Severe septic shock likely related to severe soft tissue infection: Concerns for Toxic " shock syndrome with his erythroderma  Empiric rx clinda + zosyn +linezolid continue clinda for 48-72hrs beyond shock period one study shows increase mortality with this  Monitor need for surgical debridement appreciate consultation: pain disproportionate to exam, pain/edema beyond erythema, anesthesia, crepitus, blistering/bullae, hyponatremia, rapid progression, gas on imaging or facial edema or enhancement (avoid using LRINEC score poor sensitivity)  Consider Bx of tissue to rule out fasciitis/myositis or cut down looking for dishwater color fluid  Fluid therapy with resus and norepinephrine for map > 65    Septic Shock: s/p fluid resus, monitor end organ, lactate continue with norepinephrine and vasopressin    GNR Bacteremia: Blood 2/2 5/24 follow up speciation Continue broad spectrum antibiotics until speciation and potential polymycorbial    Chronic immunosuppression: hold with severe life threatening sepsis   Stress dose steroids for relative adrenal insufficiency. Tacrolimius level 5.4 3/2022 follow repeat level on admission.     Encephalopathy: improved likely hypoperfusion and septic. Follow up LP and serial neuro exam. LP negative for meningitis    Thrombocytopenia: chronic serial monitor monitor for bleeding, trend    SANKET on CKD: Hx of CKD s/p renal transplant, acute renal injury related to septic shock and ischemic atn s/p contrast die load, avoid nephrotoxins, check CPK, nephrology consultation, avoid hyperchloremic resus fluid.     Lactic acidosis: due to sepsis serial monitor.     DM: aggressive glucose control for better source of infection consider insulin gtt. HgA1C 10.7.    Hypoxic respiratory failure: 12L oxy mask, careful with volume expansion, monitor for need for intubation. IS, mobilize and pulmonary hygiene, aspiration precautions. Start Force diuresis IVC and Right IJ dilated and large.     Volume overload: Net + 9L in first 24 hrs, careful with volume expansion and judicious fluid  management.  Force diuresis    Patient remains in critical condition from septic shock and severe soft tissue skin infection and titration of norepinephrine gtt. Critical care time provided was 60 minutes in addition to Dr Younger 15 minutes on same day. This excludes all separate billable procedures.     Please see [facility] notes for additional documentation    Reuben Swartz MD  Critical Care Medicine

## 2022-05-25 NOTE — CARE PLAN
Problem: Knowledge Deficit - Standard  Goal: Patient and family/care givers will demonstrate understanding of plan of care, disease process/condition, diagnostic tests and medications  Outcome: Not Progressing     Problem: Skin Integrity  Goal: Skin integrity is maintained or improved  Outcome: Not Progressing     Problem: Pain - Standard  Goal: Alleviation of pain or a reduction in pain to the patient’s comfort goal  Outcome: Progressing   The patient is Watcher - Medium risk of patient condition declining or worsening    Shift Goals  Clinical Goals: Hemodynamic stability  Patient Goals: Sleep and rest  Family Goals: continued updates    Progress made toward(s) clinical / shift goals:  Patient is not able to express understanding of care plan and knowledge for care, patient skin integrity is not improving, redness and heat noted on RLE, patient pain is adequately controlled.    Patient is not progressing towards the following goals:      Problem: Knowledge Deficit - Standard  Goal: Patient and family/care givers will demonstrate understanding of plan of care, disease process/condition, diagnostic tests and medications  Outcome: Not Progressing     Problem: Skin Integrity  Goal: Skin integrity is maintained or improved  Outcome: Not Progressing

## 2022-05-25 NOTE — PROCEDURES
Central Line Insertion    Date/Time: 5/25/2022 7:42 AM  Performed by: Reuben Swartz M.D.  Authorized by: Reuben Swartz M.D.     Consent:     Consent obtained:  Verbal    Consent given by:  Patient    Risks discussed:  Arterial puncture, bleeding, infection, incorrect placement and pneumothorax    Alternatives discussed:  No treatment  Pre-procedure details:     Hand hygiene: Hand hygiene performed prior to insertion      Skin preparation:  ChloraPrep    Skin preparation agent: Skin preparation agent completely dried prior to procedure    Sedation:     Sedation type:  None  Anesthesia:     Anesthesia method:  Local infiltration    Local anesthetic:  Lidocaine 1% w/o epi  Procedure details:     Location:  R internal jugular    Patient position:  Reverse Trendelenburg    Procedural supplies:  Triple lumen    Catheter size:  7 Fr    Landmarks identified: yes      Ultrasound guidance: yes      Sterile ultrasound techniques: Sterile gel and sterile probe covers were used      Number of attempts:  1    Successful placement: pending stat CXR.    Post-procedure details:     Post-procedure:  Dressing applied and line sutured    Assessment:  Blood return through all ports and free fluid flow    Patient tolerance of procedure:  Tolerated well, no immediate complications  Comments:      Right IJ central line placed for patient removed his prior, on high dose 2 pressors needing emergent access. Discussed with patient agree. Guide wire removed confirmed with bedside nurse.

## 2022-05-25 NOTE — PROGRESS NOTES
Patient taken to CT for imaging of right lower leg.  Patient transported with RN and transport tech. Patient tolerated well.   Nephrologist and Ortho MD at bedside upon arrival back to [facility].

## 2022-05-25 NOTE — WOUND TEAM
Assisted LAN Jones with wound care, selective debridement performed using wound cleanser/NS and gauze. Please see Robert's wound note for further wound care details.

## 2022-05-26 ENCOUNTER — APPOINTMENT (OUTPATIENT)
Dept: RADIOLOGY | Facility: MEDICAL CENTER | Age: 67
DRG: 870 | End: 2022-05-26
Attending: INTERNAL MEDICINE
Payer: MEDICARE

## 2022-05-26 PROBLEM — B96.20 BACTEREMIA DUE TO ESCHERICHIA COLI: Status: ACTIVE | Noted: 2022-05-26

## 2022-05-26 PROBLEM — R78.81 BACTEREMIA DUE TO ESCHERICHIA COLI: Status: ACTIVE | Noted: 2022-05-26

## 2022-05-26 PROBLEM — I50.23 ACUTE ON CHRONIC SYSTOLIC HEART FAILURE (HCC): Status: ACTIVE | Noted: 2022-05-26

## 2022-05-26 PROBLEM — J96.01 ACUTE RESPIRATORY FAILURE WITH HYPOXIA (HCC): Status: ACTIVE | Noted: 2022-05-26

## 2022-05-26 LAB
ALBUMIN SERPL BCP-MCNC: 2.6 G/DL (ref 3.2–4.9)
ALBUMIN SERPL BCP-MCNC: 3 G/DL (ref 3.2–4.9)
ALBUMIN/GLOB SERPL: 1.3 G/DL
ALP SERPL-CCNC: 51 U/L (ref 30–99)
ALP SERPL-CCNC: 67 U/L (ref 30–99)
ALT SERPL-CCNC: 34 U/L (ref 2–50)
ALT SERPL-CCNC: 38 U/L (ref 2–50)
ANION GAP SERPL CALC-SCNC: 18 MMOL/L (ref 7–16)
ANION GAP SERPL CALC-SCNC: 21 MMOL/L (ref 7–16)
ANION GAP SERPL CALC-SCNC: 22 MMOL/L (ref 7–16)
ANION GAP SERPL CALC-SCNC: 25 MMOL/L (ref 7–16)
ANION GAP SERPL CALC-SCNC: 26 MMOL/L (ref 7–16)
APTT PPP: 37.7 SEC (ref 24.7–36)
AST SERPL-CCNC: 34 U/L (ref 12–45)
AST SERPL-CCNC: 42 U/L (ref 12–45)
BACTERIA BLD CULT: ABNORMAL
BASE EXCESS BLDA CALC-SCNC: -8 MMOL/L (ref -4–3)
BASE EXCESS BLDA CALC-SCNC: -8 MMOL/L (ref -4–3)
BASOPHILS # BLD AUTO: 0 % (ref 0–1.8)
BASOPHILS # BLD AUTO: 0 % (ref 0–1.8)
BASOPHILS # BLD: 0 K/UL (ref 0–0.12)
BASOPHILS # BLD: 0 K/UL (ref 0–0.12)
BILIRUB CONJ SERPL-MCNC: 0.4 MG/DL (ref 0.1–0.5)
BILIRUB INDIRECT SERPL-MCNC: 0.4 MG/DL (ref 0–1)
BILIRUB SERPL-MCNC: 0.7 MG/DL (ref 0.1–1.5)
BILIRUB SERPL-MCNC: 0.8 MG/DL (ref 0.1–1.5)
BODY TEMPERATURE: ABNORMAL DEGREES
BODY TEMPERATURE: ABNORMAL DEGREES
BREATHS SETTING VENT: 24
BREATHS SETTING VENT: 28
BUN SERPL-MCNC: 48 MG/DL (ref 8–22)
BUN SERPL-MCNC: 61 MG/DL (ref 8–22)
BUN SERPL-MCNC: 61 MG/DL (ref 8–22)
BUN SERPL-MCNC: 62 MG/DL (ref 8–22)
BUN SERPL-MCNC: 63 MG/DL (ref 8–22)
BURR CELLS BLD QL SMEAR: NORMAL
BURR CELLS BLD QL SMEAR: NORMAL
CALCIUM SERPL-MCNC: 6.5 MG/DL (ref 8.5–10.5)
CALCIUM SERPL-MCNC: 6.5 MG/DL (ref 8.5–10.5)
CALCIUM SERPL-MCNC: 6.7 MG/DL (ref 8.5–10.5)
CALCIUM SERPL-MCNC: 7.1 MG/DL (ref 8.5–10.5)
CALCIUM SERPL-MCNC: 7.1 MG/DL (ref 8.5–10.5)
CHLORIDE SERPL-SCNC: 103 MMOL/L (ref 96–112)
CHLORIDE SERPL-SCNC: 105 MMOL/L (ref 96–112)
CO2 BLDA-SCNC: 19 MMOL/L (ref 20–33)
CO2 BLDA-SCNC: 22 MMOL/L (ref 20–33)
CO2 SERPL-SCNC: 12 MMOL/L (ref 20–33)
CO2 SERPL-SCNC: 12 MMOL/L (ref 20–33)
CO2 SERPL-SCNC: 13 MMOL/L (ref 20–33)
CO2 SERPL-SCNC: 17 MMOL/L (ref 20–33)
CO2 SERPL-SCNC: 19 MMOL/L (ref 20–33)
CREAT SERPL-MCNC: 4.29 MG/DL (ref 0.5–1.4)
CREAT SERPL-MCNC: 5.22 MG/DL (ref 0.5–1.4)
CREAT SERPL-MCNC: 5.28 MG/DL (ref 0.5–1.4)
CREAT SERPL-MCNC: 5.31 MG/DL (ref 0.5–1.4)
CREAT SERPL-MCNC: 5.41 MG/DL (ref 0.5–1.4)
DELSYS IDSYS: ABNORMAL
DELSYS IDSYS: ABNORMAL
END TIDAL CARBON DIOXIDE IECO2: 30 MMHG
END TIDAL CARBON DIOXIDE IECO2: 36 MMHG
EOSINOPHIL # BLD AUTO: 0 K/UL (ref 0–0.51)
EOSINOPHIL # BLD AUTO: 0 K/UL (ref 0–0.51)
EOSINOPHIL NFR BLD: 0 % (ref 0–6.9)
EOSINOPHIL NFR BLD: 0 % (ref 0–6.9)
ERYTHROCYTE [DISTWIDTH] IN BLOOD BY AUTOMATED COUNT: 51.3 FL (ref 35.9–50)
ERYTHROCYTE [DISTWIDTH] IN BLOOD BY AUTOMATED COUNT: 52.5 FL (ref 35.9–50)
GFR SERPLBLD CREATININE-BSD FMLA CKD-EPI: 11 ML/MIN/1.73 M 2
GFR SERPLBLD CREATININE-BSD FMLA CKD-EPI: 15 ML/MIN/1.73 M 2
GLOBULIN SER CALC-MCNC: 2.4 G/DL (ref 1.9–3.5)
GLUCOSE BLD STRIP.AUTO-MCNC: 129 MG/DL (ref 65–99)
GLUCOSE BLD STRIP.AUTO-MCNC: 147 MG/DL (ref 65–99)
GLUCOSE BLD STRIP.AUTO-MCNC: 173 MG/DL (ref 65–99)
GLUCOSE BLD STRIP.AUTO-MCNC: 82 MG/DL (ref 65–99)
GLUCOSE BLD STRIP.AUTO-MCNC: 98 MG/DL (ref 65–99)
GLUCOSE SERPL-MCNC: 130 MG/DL (ref 65–99)
GLUCOSE SERPL-MCNC: 149 MG/DL (ref 65–99)
GLUCOSE SERPL-MCNC: 160 MG/DL (ref 65–99)
GLUCOSE SERPL-MCNC: 160 MG/DL (ref 65–99)
GLUCOSE SERPL-MCNC: 96 MG/DL (ref 65–99)
GRAM STN SPEC: NORMAL
HAV IGM SERPL QL IA: NORMAL
HBV CORE IGM SER QL: NORMAL
HBV SURFACE AB SERPL IA-ACNC: <3.5 MIU/ML (ref 0–10)
HBV SURFACE AG SER QL: NORMAL
HCO3 BLDA-SCNC: 17.5 MMOL/L (ref 17–25)
HCO3 BLDA-SCNC: 20.6 MMOL/L (ref 17–25)
HCT VFR BLD AUTO: 45.8 % (ref 42–52)
HCT VFR BLD AUTO: 47.1 % (ref 42–52)
HCV AB SER QL: NORMAL
HGB BLD-MCNC: 14.4 G/DL (ref 14–18)
HGB BLD-MCNC: 14.5 G/DL (ref 14–18)
HOROWITZ INDEX BLDA+IHG-RTO: 106 MM[HG]
HOROWITZ INDEX BLDA+IHG-RTO: 364 MM[HG]
INR PPP: 1.53 (ref 0.87–1.13)
LACTATE BLD-SCNC: 4.9 MMOL/L (ref 0.5–2)
LYMPHOCYTES # BLD AUTO: 0.27 K/UL (ref 1–4.8)
LYMPHOCYTES # BLD AUTO: 0.42 K/UL (ref 1–4.8)
LYMPHOCYTES NFR BLD: 0.9 % (ref 22–41)
LYMPHOCYTES NFR BLD: 1.7 % (ref 22–41)
MANUAL DIFF BLD: ABNORMAL
MANUAL DIFF BLD: NORMAL
MCH RBC QN AUTO: 29.4 PG (ref 27–33)
MCH RBC QN AUTO: 29.9 PG (ref 27–33)
MCHC RBC AUTO-ENTMCNC: 30.6 G/DL (ref 33.7–35.3)
MCHC RBC AUTO-ENTMCNC: 31.7 G/DL (ref 33.7–35.3)
MCV RBC AUTO: 94.4 FL (ref 81.4–97.8)
MCV RBC AUTO: 96.1 FL (ref 81.4–97.8)
METAMYELOCYTES NFR BLD MANUAL: 1.8 %
METAMYELOCYTES NFR BLD MANUAL: 4.3 %
MODE IMODE: ABNORMAL
MODE IMODE: ABNORMAL
MONOCYTES # BLD AUTO: 1.85 K/UL (ref 0–0.85)
MONOCYTES # BLD AUTO: 2.16 K/UL (ref 0–0.85)
MONOCYTES NFR BLD AUTO: 6.1 % (ref 0–13.4)
MONOCYTES NFR BLD AUTO: 8.8 % (ref 0–13.4)
MORPHOLOGY BLD-IMP: NORMAL
MORPHOLOGY BLD-IMP: NORMAL
NEUTROPHILS # BLD AUTO: 21.49 K/UL (ref 1.82–7.42)
NEUTROPHILS # BLD AUTO: 26.96 K/UL (ref 1.82–7.42)
NEUTROPHILS NFR BLD: 78.3 % (ref 44–72)
NEUTROPHILS NFR BLD: 79.8 % (ref 44–72)
NEUTS BAND NFR BLD MANUAL: 10.4 % (ref 0–10)
NEUTS BAND NFR BLD MANUAL: 7.9 % (ref 0–10)
NRBC # BLD AUTO: 0 K/UL
NRBC # BLD AUTO: 0 K/UL
NRBC BLD-RTO: 0 /100 WBC
NRBC BLD-RTO: 0 /100 WBC
O2/TOTAL GAS SETTING VFR VENT: 100 %
O2/TOTAL GAS SETTING VFR VENT: 80 %
PCO2 BLDA: 35.6 MMHG (ref 26–37)
PCO2 BLDA: 51.4 MMHG (ref 26–37)
PCO2 TEMP ADJ BLDA: 35.5 MMHG (ref 26–37)
PCO2 TEMP ADJ BLDA: 49.2 MMHG (ref 26–37)
PEEP END EXPIRATORY PRESSURE IPEEP: 14 CMH20
PEEP END EXPIRATORY PRESSURE IPEEP: 8 CMH20
PH BLDA: 7.21 [PH] (ref 7.4–7.5)
PH BLDA: 7.3 [PH] (ref 7.4–7.5)
PH TEMP ADJ BLDA: 7.22 [PH] (ref 7.4–7.5)
PH TEMP ADJ BLDA: 7.3 [PH] (ref 7.4–7.5)
PLATELET # BLD AUTO: 76 K/UL (ref 164–446)
PLATELET # BLD AUTO: 89 K/UL (ref 164–446)
PLATELET BLD QL SMEAR: NORMAL
PLATELET BLD QL SMEAR: NORMAL
PMV BLD AUTO: 11.7 FL (ref 9–12.9)
PMV BLD AUTO: 11.9 FL (ref 9–12.9)
PO2 BLDA: 106 MMHG (ref 64–87)
PO2 BLDA: 291 MMHG (ref 64–87)
PO2 TEMP ADJ BLDA: 100 MMHG (ref 64–87)
PO2 TEMP ADJ BLDA: 290 MMHG (ref 64–87)
POIKILOCYTOSIS BLD QL SMEAR: NORMAL
POIKILOCYTOSIS BLD QL SMEAR: NORMAL
POTASSIUM SERPL-SCNC: 5.3 MMOL/L (ref 3.6–5.5)
POTASSIUM SERPL-SCNC: 5.3 MMOL/L (ref 3.6–5.5)
POTASSIUM SERPL-SCNC: 5.6 MMOL/L (ref 3.6–5.5)
POTASSIUM SERPL-SCNC: 5.8 MMOL/L (ref 3.6–5.5)
POTASSIUM SERPL-SCNC: 6.2 MMOL/L (ref 3.6–5.5)
PROT SERPL-MCNC: 5.4 G/DL (ref 6–8.2)
PROT SERPL-MCNC: 5.4 G/DL (ref 6–8.2)
PROTHROMBIN TIME: 17.9 SEC (ref 12–14.6)
RBC # BLD AUTO: 4.85 M/UL (ref 4.7–6.1)
RBC # BLD AUTO: 4.9 M/UL (ref 4.7–6.1)
RBC BLD AUTO: PRESENT
RBC BLD AUTO: PRESENT
SAO2 % BLDA: 100 % (ref 93–99)
SAO2 % BLDA: 97 % (ref 93–99)
SIGNIFICANT IND 70042: ABNORMAL
SIGNIFICANT IND 70042: ABNORMAL
SIGNIFICANT IND 70042: NORMAL
SITE SITE: ABNORMAL
SITE SITE: ABNORMAL
SITE SITE: NORMAL
SODIUM SERPL-SCNC: 140 MMOL/L (ref 135–145)
SODIUM SERPL-SCNC: 140 MMOL/L (ref 135–145)
SODIUM SERPL-SCNC: 142 MMOL/L (ref 135–145)
SODIUM SERPL-SCNC: 143 MMOL/L (ref 135–145)
SODIUM SERPL-SCNC: 143 MMOL/L (ref 135–145)
SOURCE SOURCE: ABNORMAL
SOURCE SOURCE: ABNORMAL
SOURCE SOURCE: NORMAL
SPECIMEN DRAWN FROM PATIENT: ABNORMAL
SPECIMEN DRAWN FROM PATIENT: ABNORMAL
TACROLIMUS BLD-MCNC: 2.7 NG/ML
TIDAL VOLUME IVT: 530 ML
TIDAL VOLUME IVT: 530 ML
UFH PPP CHRO-ACNC: 0.59 IU/ML
UFH PPP CHRO-ACNC: 0.6 IU/ML
UFH PPP CHRO-ACNC: <0.1 IU/ML
WBC # BLD AUTO: 24.5 K/UL (ref 4.8–10.8)
WBC # BLD AUTO: 30.4 K/UL (ref 4.8–10.8)

## 2022-05-26 PROCEDURE — 86706 HEP B SURFACE ANTIBODY: CPT

## 2022-05-26 PROCEDURE — 770022 HCHG ROOM/CARE - ICU (200)

## 2022-05-26 PROCEDURE — 0BH17EZ INSERTION OF ENDOTRACHEAL AIRWAY INTO TRACHEA, VIA NATURAL OR ARTIFICIAL OPENING: ICD-10-PCS | Performed by: INTERNAL MEDICINE

## 2022-05-26 PROCEDURE — 37799 UNLISTED PX VASCULAR SURGERY: CPT

## 2022-05-26 PROCEDURE — 700111 HCHG RX REV CODE 636 W/ 250 OVERRIDE (IP)

## 2022-05-26 PROCEDURE — 31624 DX BRONCHOSCOPE/LAVAGE: CPT | Performed by: INTERNAL MEDICINE

## 2022-05-26 PROCEDURE — 0B938ZZ DRAINAGE OF RIGHT MAIN BRONCHUS, VIA NATURAL OR ARTIFICIAL OPENING ENDOSCOPIC: ICD-10-PCS | Performed by: INTERNAL MEDICINE

## 2022-05-26 PROCEDURE — 99291 CRITICAL CARE FIRST HOUR: CPT | Mod: 25 | Performed by: INTERNAL MEDICINE

## 2022-05-26 PROCEDURE — 02H633Z INSERTION OF INFUSION DEVICE INTO RIGHT ATRIUM, PERCUTANEOUS APPROACH: ICD-10-PCS | Performed by: INTERNAL MEDICINE

## 2022-05-26 PROCEDURE — 90945 DIALYSIS ONE EVALUATION: CPT

## 2022-05-26 PROCEDURE — 700111 HCHG RX REV CODE 636 W/ 250 OVERRIDE (IP): Performed by: INTERNAL MEDICINE

## 2022-05-26 PROCEDURE — 36620 INSERTION CATHETER ARTERY: CPT

## 2022-05-26 PROCEDURE — 36620 INSERTION CATHETER ARTERY: CPT | Performed by: INTERNAL MEDICINE

## 2022-05-26 PROCEDURE — 99292 CRITICAL CARE ADDL 30 MIN: CPT | Mod: 25 | Performed by: INTERNAL MEDICINE

## 2022-05-26 PROCEDURE — 36556 INSERT NON-TUNNEL CV CATH: CPT

## 2022-05-26 PROCEDURE — 36556 INSERT NON-TUNNEL CV CATH: CPT | Mod: LT | Performed by: INTERNAL MEDICINE

## 2022-05-26 PROCEDURE — 87070 CULTURE OTHR SPECIMN AEROBIC: CPT

## 2022-05-26 PROCEDURE — 71045 X-RAY EXAM CHEST 1 VIEW: CPT

## 2022-05-26 PROCEDURE — 85610 PROTHROMBIN TIME: CPT

## 2022-05-26 PROCEDURE — 83605 ASSAY OF LACTIC ACID: CPT

## 2022-05-26 PROCEDURE — 31500 INSERT EMERGENCY AIRWAY: CPT

## 2022-05-26 PROCEDURE — 0B9C8ZZ DRAINAGE OF RIGHT UPPER LUNG LOBE, VIA NATURAL OR ARTIFICIAL OPENING ENDOSCOPIC: ICD-10-PCS | Performed by: INTERNAL MEDICINE

## 2022-05-26 PROCEDURE — 85007 BL SMEAR W/DIFF WBC COUNT: CPT | Mod: 91

## 2022-05-26 PROCEDURE — 302136 NUTRITION PUMP: Performed by: INTERNAL MEDICINE

## 2022-05-26 PROCEDURE — 31645 BRNCHSC W/THER ASPIR 1ST: CPT | Performed by: INTERNAL MEDICINE

## 2022-05-26 PROCEDURE — 85520 HEPARIN ASSAY: CPT | Mod: 91

## 2022-05-26 PROCEDURE — 85025 COMPLETE CBC W/AUTO DIFF WBC: CPT | Mod: 91

## 2022-05-26 PROCEDURE — 700101 HCHG RX REV CODE 250: Performed by: INTERNAL MEDICINE

## 2022-05-26 PROCEDURE — 99152 MOD SED SAME PHYS/QHP 5/>YRS: CPT

## 2022-05-26 PROCEDURE — 99153 MOD SED SAME PHYS/QHP EA: CPT

## 2022-05-26 PROCEDURE — 87205 SMEAR GRAM STAIN: CPT

## 2022-05-26 PROCEDURE — 82962 GLUCOSE BLOOD TEST: CPT | Mod: 91

## 2022-05-26 PROCEDURE — 31500 INSERT EMERGENCY AIRWAY: CPT | Performed by: INTERNAL MEDICINE

## 2022-05-26 PROCEDURE — 99233 SBSQ HOSP IP/OBS HIGH 50: CPT | Performed by: INTERNAL MEDICINE

## 2022-05-26 PROCEDURE — 80053 COMPREHEN METABOLIC PANEL: CPT

## 2022-05-26 PROCEDURE — 94799 UNLISTED PULMONARY SVC/PX: CPT

## 2022-05-26 PROCEDURE — 94003 VENT MGMT INPAT SUBQ DAY: CPT

## 2022-05-26 PROCEDURE — 0B9F8ZX DRAINAGE OF RIGHT LOWER LUNG LOBE, VIA NATURAL OR ARTIFICIAL OPENING ENDOSCOPIC, DIAGNOSTIC: ICD-10-PCS | Performed by: INTERNAL MEDICINE

## 2022-05-26 PROCEDURE — 80048 BASIC METABOLIC PNL TOTAL CA: CPT | Mod: 91

## 2022-05-26 PROCEDURE — 5A1955Z RESPIRATORY VENTILATION, GREATER THAN 96 CONSECUTIVE HOURS: ICD-10-PCS | Performed by: INTERNAL MEDICINE

## 2022-05-26 PROCEDURE — 700105 HCHG RX REV CODE 258: Performed by: INTERNAL MEDICINE

## 2022-05-26 PROCEDURE — 5A1D90Z PERFORMANCE OF URINARY FILTRATION, CONTINUOUS, GREATER THAN 18 HOURS PER DAY: ICD-10-PCS | Performed by: INTERNAL MEDICINE

## 2022-05-26 PROCEDURE — C1752 CATH,HEMODIALYSIS,SHORT-TERM: HCPCS

## 2022-05-26 PROCEDURE — 302978 HCHG BRONCHOSCOPY-DIAGNOSTIC

## 2022-05-26 PROCEDURE — 80074 ACUTE HEPATITIS PANEL: CPT

## 2022-05-26 PROCEDURE — 82803 BLOOD GASES ANY COMBINATION: CPT | Mod: 91

## 2022-05-26 PROCEDURE — 94002 VENT MGMT INPAT INIT DAY: CPT

## 2022-05-26 PROCEDURE — 85730 THROMBOPLASTIN TIME PARTIAL: CPT

## 2022-05-26 PROCEDURE — 80076 HEPATIC FUNCTION PANEL: CPT

## 2022-05-26 PROCEDURE — 0B9D8ZZ DRAINAGE OF RIGHT MIDDLE LUNG LOBE, VIA NATURAL OR ARTIFICIAL OPENING ENDOSCOPIC: ICD-10-PCS | Performed by: INTERNAL MEDICINE

## 2022-05-26 PROCEDURE — C1751 CATH, INF, PER/CENT/MIDLINE: HCPCS

## 2022-05-26 PROCEDURE — 03HY32Z INSERTION OF MONITORING DEVICE INTO UPPER ARTERY, PERCUTANEOUS APPROACH: ICD-10-PCS | Performed by: INTERNAL MEDICINE

## 2022-05-26 RX ORDER — EPINEPHRINE HCL IN 0.9 % NACL 4MG/250ML
0-10 PLASTIC BAG, INJECTION (ML) INTRAVENOUS CONTINUOUS
Status: DISCONTINUED | OUTPATIENT
Start: 2022-05-26 | End: 2022-05-28

## 2022-05-26 RX ORDER — SODIUM CHLORIDE 9 MG/ML
500 INJECTION, SOLUTION INTRAVENOUS ONCE
Status: COMPLETED | OUTPATIENT
Start: 2022-05-26 | End: 2022-05-26

## 2022-05-26 RX ORDER — PHENYLEPHRINE HCL IN 0.9% NACL 0.5 MG/5ML
100 SYRINGE (ML) INTRAVENOUS
Status: COMPLETED | OUTPATIENT
Start: 2022-05-26 | End: 2022-05-26

## 2022-05-26 RX ORDER — SODIUM CHLORIDE 9 MG/ML
1000 INJECTION, SOLUTION INTRAVENOUS ONCE
Status: COMPLETED | OUTPATIENT
Start: 2022-05-26 | End: 2022-05-26

## 2022-05-26 RX ORDER — PHENYLEPHRINE HCL IN 0.9% NACL 0.5 MG/5ML
SYRINGE (ML) INTRAVENOUS
Status: COMPLETED
Start: 2022-05-26 | End: 2022-05-26

## 2022-05-26 RX ORDER — LORAZEPAM 2 MG/ML
2 INJECTION INTRAMUSCULAR ONCE
Status: COMPLETED | OUTPATIENT
Start: 2022-05-26 | End: 2022-05-26

## 2022-05-26 RX ORDER — AMOXICILLIN 250 MG
2 CAPSULE ORAL 2 TIMES DAILY
Status: DISCONTINUED | OUTPATIENT
Start: 2022-05-26 | End: 2022-06-04

## 2022-05-26 RX ORDER — FUROSEMIDE 10 MG/ML
100 INJECTION INTRAMUSCULAR; INTRAVENOUS
Status: DISCONTINUED | OUTPATIENT
Start: 2022-05-27 | End: 2022-05-28

## 2022-05-26 RX ORDER — CALCIUM GLUCONATE 20 MG/ML
1 INJECTION, SOLUTION INTRAVENOUS ONCE
Status: COMPLETED | OUTPATIENT
Start: 2022-05-26 | End: 2022-05-26

## 2022-05-26 RX ORDER — DEXTROSE MONOHYDRATE 25 G/50ML
25 INJECTION, SOLUTION INTRAVENOUS ONCE
Status: COMPLETED | OUTPATIENT
Start: 2022-05-26 | End: 2022-05-26

## 2022-05-26 RX ORDER — BISACODYL 10 MG
10 SUPPOSITORY, RECTAL RECTAL
Status: DISCONTINUED | OUTPATIENT
Start: 2022-05-26 | End: 2022-06-04

## 2022-05-26 RX ORDER — POLYETHYLENE GLYCOL 3350 17 G/17G
1 POWDER, FOR SOLUTION ORAL
Status: DISCONTINUED | OUTPATIENT
Start: 2022-05-26 | End: 2022-06-04

## 2022-05-26 RX ORDER — PHENYLEPHRINE HYDROCHLORIDE 10 MG/ML
INJECTION, SOLUTION INTRAMUSCULAR; INTRAVENOUS; SUBCUTANEOUS
Status: COMPLETED
Start: 2022-05-26 | End: 2022-05-26

## 2022-05-26 RX ORDER — FAMOTIDINE 20 MG/1
20 TABLET, FILM COATED ORAL DAILY
Status: DISCONTINUED | OUTPATIENT
Start: 2022-05-26 | End: 2022-05-26

## 2022-05-26 RX ORDER — DEXTROSE MONOHYDRATE 25 G/50ML
25 INJECTION, SOLUTION INTRAVENOUS
Status: DISCONTINUED | OUTPATIENT
Start: 2022-05-26 | End: 2022-05-28

## 2022-05-26 RX ORDER — DEXMEDETOMIDINE HYDROCHLORIDE 4 UG/ML
.1-1.5 INJECTION INTRAVENOUS CONTINUOUS
Status: DISCONTINUED | OUTPATIENT
Start: 2022-05-26 | End: 2022-05-27

## 2022-05-26 RX ORDER — HEPARIN SODIUM 1000 [USP'U]/ML
40 INJECTION, SOLUTION INTRAVENOUS; SUBCUTANEOUS PRN
Status: DISCONTINUED | OUTPATIENT
Start: 2022-05-26 | End: 2022-05-29

## 2022-05-26 RX ORDER — PHENYLEPHRINE HCL IN 0.9% NACL 0.5 MG/5ML
40 SYRINGE (ML) INTRAVENOUS
Status: DISCONTINUED | OUTPATIENT
Start: 2022-05-26 | End: 2022-05-26

## 2022-05-26 RX ORDER — FAMOTIDINE 20 MG/1
20 TABLET, FILM COATED ORAL DAILY
Status: DISCONTINUED | OUTPATIENT
Start: 2022-05-27 | End: 2022-06-01

## 2022-05-26 RX ORDER — HEPARIN SODIUM 5000 [USP'U]/100ML
0-30 INJECTION, SOLUTION INTRAVENOUS CONTINUOUS
Status: DISCONTINUED | OUTPATIENT
Start: 2022-05-26 | End: 2022-05-29

## 2022-05-26 RX ADMIN — NOREPINEPHRINE BITARTRATE 60 MCG/MIN: 1 INJECTION, SOLUTION, CONCENTRATE INTRAVENOUS at 09:32

## 2022-05-26 RX ADMIN — NOREPINEPHRINE BITARTRATE 60 MCG/MIN: 1 INJECTION, SOLUTION, CONCENTRATE INTRAVENOUS at 19:52

## 2022-05-26 RX ADMIN — CALCIUM GLUCONATE 1 G: 20 INJECTION, SOLUTION INTRAVENOUS at 09:20

## 2022-05-26 RX ADMIN — VASOPRESSIN 0.03 UNITS/MIN: 20 INJECTION, SOLUTION INTRAMUSCULAR; SUBCUTANEOUS at 05:51

## 2022-05-26 RX ADMIN — FAMOTIDINE 20 MG: 10 INJECTION INTRAVENOUS at 09:29

## 2022-05-26 RX ADMIN — SODIUM BICARBONATE 100 MEQ: 84 INJECTION, SOLUTION INTRAVENOUS at 08:13

## 2022-05-26 RX ADMIN — PIPERACILLIN AND TAZOBACTAM 4.5 G: 4; .5 INJECTION, POWDER, FOR SOLUTION INTRAVENOUS at 06:11

## 2022-05-26 RX ADMIN — NOREPINEPHRINE BITARTRATE 14 MCG/MIN: 1 INJECTION, SOLUTION, CONCENTRATE INTRAVENOUS at 05:50

## 2022-05-26 RX ADMIN — PHENYLEPHRINE HYDROCHLORIDE 40000 MCG: 10 INJECTION INTRAVENOUS at 12:14

## 2022-05-26 RX ADMIN — DEXMEDETOMIDINE 1.2 MCG/KG/HR: 200 INJECTION, SOLUTION INTRAVENOUS at 15:59

## 2022-05-26 RX ADMIN — HEPARIN SODIUM 18 UNITS/KG/HR: 5000 INJECTION, SOLUTION INTRAVENOUS at 23:08

## 2022-05-26 RX ADMIN — HEPARIN SODIUM 4100 UNITS: 1000 INJECTION, SOLUTION INTRAVENOUS; SUBCUTANEOUS at 10:23

## 2022-05-26 RX ADMIN — Medication 100 MCG: at 12:15

## 2022-05-26 RX ADMIN — HYDROMORPHONE HYDROCHLORIDE 0.5 MG: 1 INJECTION, SOLUTION INTRAMUSCULAR; INTRAVENOUS; SUBCUTANEOUS at 02:31

## 2022-05-26 RX ADMIN — Medication 100 MCG: at 12:14

## 2022-05-26 RX ADMIN — HYDROCORTISONE SODIUM SUCCINATE 50 MG: 100 INJECTION, POWDER, FOR SOLUTION INTRAMUSCULAR; INTRAVENOUS at 00:03

## 2022-05-26 RX ADMIN — HYDROMORPHONE HYDROCHLORIDE 0.5 MG: 1 INJECTION, SOLUTION INTRAMUSCULAR; INTRAVENOUS; SUBCUTANEOUS at 16:42

## 2022-05-26 RX ADMIN — VASOPRESSIN 0.03 UNITS/MIN: 20 INJECTION, SOLUTION INTRAMUSCULAR; SUBCUTANEOUS at 18:11

## 2022-05-26 RX ADMIN — DAKIN'S SOLUTION 0.125% (QUARTER STRENGTH) 473 ML: 0.12 SOLUTION at 18:19

## 2022-05-26 RX ADMIN — HYDROCORTISONE SODIUM SUCCINATE 50 MG: 100 INJECTION, POWDER, FOR SOLUTION INTRAMUSCULAR; INTRAVENOUS at 18:19

## 2022-05-26 RX ADMIN — Medication 100 MCG: at 12:17

## 2022-05-26 RX ADMIN — DEXMEDETOMIDINE 0.2 MCG/KG/HR: 200 INJECTION, SOLUTION INTRAVENOUS at 09:29

## 2022-05-26 RX ADMIN — DEXMEDETOMIDINE 1.3 MCG/KG/HR: 200 INJECTION, SOLUTION INTRAVENOUS at 19:53

## 2022-05-26 RX ADMIN — HYDROCORTISONE SODIUM SUCCINATE 50 MG: 100 INJECTION, POWDER, FOR SOLUTION INTRAMUSCULAR; INTRAVENOUS at 06:12

## 2022-05-26 RX ADMIN — DAKIN'S SOLUTION 0.125% (QUARTER STRENGTH) 473 ML: 0.12 SOLUTION at 06:14

## 2022-05-26 RX ADMIN — PHENYLEPHRINE HYDROCHLORIDE 250 MCG/MIN: 10 INJECTION INTRAVENOUS at 17:12

## 2022-05-26 RX ADMIN — HYDROCORTISONE SODIUM SUCCINATE 50 MG: 100 INJECTION, POWDER, FOR SOLUTION INTRAMUSCULAR; INTRAVENOUS at 12:57

## 2022-05-26 RX ADMIN — FUROSEMIDE 100 MG: 20 INJECTION, SOLUTION INTRAMUSCULAR; INTRAVENOUS at 06:13

## 2022-05-26 RX ADMIN — PIPERACILLIN AND TAZOBACTAM 3.38 G: 3; .375 INJECTION, POWDER, LYOPHILIZED, FOR SOLUTION INTRAVENOUS; PARENTERAL at 13:24

## 2022-05-26 RX ADMIN — HYDROCORTISONE SODIUM SUCCINATE 50 MG: 100 INJECTION, POWDER, FOR SOLUTION INTRAMUSCULAR; INTRAVENOUS at 23:13

## 2022-05-26 RX ADMIN — DEXTROSE MONOHYDRATE 25 G: 25 INJECTION, SOLUTION INTRAVENOUS at 08:10

## 2022-05-26 RX ADMIN — NOREPINEPHRINE BITARTRATE 60 MCG/MIN: 1 INJECTION, SOLUTION, CONCENTRATE INTRAVENOUS at 18:11

## 2022-05-26 RX ADMIN — HEPARIN SODIUM 5000 UNITS: 5000 INJECTION, SOLUTION INTRAVENOUS; SUBCUTANEOUS at 06:13

## 2022-05-26 RX ADMIN — HEPARIN SODIUM 18 UNITS/KG/HR: 5000 INJECTION, SOLUTION INTRAVENOUS at 10:23

## 2022-05-26 RX ADMIN — SODIUM CHLORIDE 1000 ML: 9 INJECTION, SOLUTION INTRAVENOUS at 13:09

## 2022-05-26 RX ADMIN — LORAZEPAM 2 MG: 2 INJECTION INTRAMUSCULAR; INTRAVENOUS at 08:14

## 2022-05-26 RX ADMIN — PHENYLEPHRINE HYDROCHLORIDE 100 MCG/MIN: 10 INJECTION INTRAVENOUS at 13:15

## 2022-05-26 RX ADMIN — PIPERACILLIN AND TAZOBACTAM 3.38 G: 3; .375 INJECTION, POWDER, LYOPHILIZED, FOR SOLUTION INTRAVENOUS; PARENTERAL at 21:13

## 2022-05-26 RX ADMIN — VASOPRESSIN 0.03 UNITS/MIN: 20 INJECTION, SOLUTION INTRAMUSCULAR; SUBCUTANEOUS at 11:32

## 2022-05-26 RX ADMIN — NOREPINEPHRINE BITARTRATE 50 MCG/MIN: 1 INJECTION, SOLUTION, CONCENTRATE INTRAVENOUS at 14:27

## 2022-05-26 RX ADMIN — SODIUM CHLORIDE 500 ML: 9 INJECTION, SOLUTION INTRAVENOUS at 08:05

## 2022-05-26 RX ADMIN — NOREPINEPHRINE BITARTRATE 60 MCG/MIN: 1 INJECTION, SOLUTION, CONCENTRATE INTRAVENOUS at 22:33

## 2022-05-26 RX ADMIN — DEXMEDETOMIDINE 1.3 MCG/KG/HR: 200 INJECTION, SOLUTION INTRAVENOUS at 21:12

## 2022-05-26 RX ADMIN — Medication 100 MCG: at 12:16

## 2022-05-26 RX ADMIN — NOREPINEPHRINE BITARTRATE 60 MCG/MIN: 1 INJECTION, SOLUTION, CONCENTRATE INTRAVENOUS at 12:04

## 2022-05-26 RX ADMIN — NOREPINEPHRINE BITARTRATE 45 MCG/MIN: 1 INJECTION, SOLUTION, CONCENTRATE INTRAVENOUS at 14:58

## 2022-05-26 RX ADMIN — HYDROMORPHONE HYDROCHLORIDE 0.5 MG: 1 INJECTION, SOLUTION INTRAMUSCULAR; INTRAVENOUS; SUBCUTANEOUS at 22:46

## 2022-05-26 ASSESSMENT — PAIN DESCRIPTION - PAIN TYPE
TYPE: ACUTE PAIN

## 2022-05-26 ASSESSMENT — FIBROSIS 4 INDEX: FIB4 SCORE: 5.83

## 2022-05-26 NOTE — PROCEDURES
"Arterial Line Insertion    Date/Time: 5/26/2022 10:15 AM  Performed by: Reuben Swartz M.D.  Authorized by: Reuben Swartz M.D.   Consent: The procedure was performed in an emergent situation.  Risks and benefits: risks, benefits and alternatives were discussed  Patient identity confirmed: anonymous protocol, patient vented/unresponsive  Time out: Immediately prior to procedure a \"time out\" was called to verify the correct patient, procedure, equipment, support staff and site/side marked as required.  Preparation: Patient was prepped and draped in the usual sterile fashion.  Indications: multiple ABGs, respiratory failure and hemodynamic monitoring  Location: right radial  Osvaldo's test normal: yes  Needle gauge: 18  Seldinger technique: Seldinger technique used  Number of attempts: 1  Post-procedure: line sutured  Post-procedure CMS: normal  Patient tolerance: patient tolerated the procedure well with no immediate complications  Comments: Us guided arterial line              "

## 2022-05-26 NOTE — PROGRESS NOTES
Infectious Disease Progress Note    Author: Vivian Francois M.D. Date & Time of service: 2022  8:12 AM    Chief Complaint:  Follow-up for septic shock, E. coli bacteremia, right lower extremity cellulitis    Interval History:   afebrile, WBC 24.5, blood cultures now growing E. Coli, meningitis CSF PCR panel not detected.  Patient remains on pressors.  Patient was noted to be obtunded with respiratory distress early this morning and has not now been intubated.  Renal function worse today.  Daughter and girlfriend at bedside with questions      Labs Reviewed, Medications Reviewed and Wound Reviewed.    Review of Systems:  Review of Systems   Unable to perform ROS: Intubated       Hemodynamics:  Temp (24hrs), Av.1 °C (98.8 °F), Min:37.1 °C (98.8 °F), Max:37.1 °C (98.8 °F)  Temperature: 37.1 °C (98.8 °F), Monitored Temp: 36 °C (96.8 °F)  Pulse  Av  Min: 84  Max: 159   Blood Pressure : 104/56       Physical Exam:  Physical Exam  Vitals and nursing note reviewed.   Constitutional:       Appearance: He is ill-appearing.   HENT:      Mouth/Throat:      Mouth: Mucous membranes are moist.      Comments: ET tube  Eyes:      Comments: Eyes closed   Neck:      Comments: Right IJ catheter    Left IJ HD catheter  Cardiovascular:      Rate and Rhythm: Tachycardia present.   Abdominal:      Palpations: Abdomen is soft.   Musculoskeletal:      Right lower leg: Edema present.      Comments: Right knee surgical scar    Open wound on the anterior aspect of the right lower extremity dressed   Skin:     General: Skin is warm and dry.   Neurological:      Comments: Intubated and sedated   Psychiatric:      Comments: Unable to assess as patient intubated and sedated         Meds:    Current Facility-Administered Medications:   •  calcium GLUConate-NaCl  •  EPINEPHrine (Adrenalin) infusion  •  dextrose bolus **AND** insulin regular **AND** POCT glucose docked device  •  sodium bicarbonate  •  LORazepam  •  sodium  chloride  •  insulin regular **AND** POC blood glucose manual result **AND** NOTIFY MD and PharmD **AND** Administer 20 grams of glucose (approximately 8 ounces of fruit juice) every 15 minutes PRN FSBG less than 70 mg/dL **AND** dextrose bolus  •  heparin  •  dakins 0.125% (1/4 strength)  •  furosemide  •  norepinephrine (Levophed) infusion  •  piperacillin-tazobactam  •  hydrocortisone sodium succinate PF  •  HYDROmorphone    Labs:  Recent Labs     05/24/22 1938 05/25/22 0215 05/26/22  0359   WBC 12.4* 13.1* 24.5*   RBC 4.31* 4.20* 4.90   HEMOGLOBIN 12.8* 12.3* 14.4   HEMATOCRIT 40.7* 40.1* 47.1   MCV 94.4 95.5 96.1   MCH 29.7 29.3 29.4   RDW 50.5* 52.9* 52.5*   PLATELETCT 112* 93* 76*   MPV 11.3 11.4 11.9   NEUTSPOLYS 87.00* 62.80 79.80*   LYMPHOCYTES 1.70* 4.40* 1.70*   MONOCYTES 0.90 2.60 8.80   EOSINOPHILS 0.00 0.00 0.00   BASOPHILS 0.00 0.00 0.00   RBCMORPHOLO Present Present Present     Recent Labs     05/24/22  1525 05/24/22 1938 05/25/22 0215 05/26/22  0359   SODIUM  --  139 138 140   POTASSIUM  --  4.6 4.6 6.2*   CHLORIDE  --  106 103 103   CO2  --  17* 21 19*   GLUCOSE  --  230* 250* 96   BUN  --  37* 42* 61*   CPKTOTAL 684*  --   --   --      Recent Labs     05/24/22  1122 05/24/22 1938 05/25/22 0215 05/26/22  0359   ALBUMIN 3.7  --  2.9* 3.0*   TBILIRUBIN 0.9  --  0.5 0.7   ALKPHOSPHAT 75  --  42 51   TOTPROTEIN 5.4*  --  4.7* 5.4*   ALTSGPT 22  --  25 38   ASTSGOT 24  --  40 42   CREATININE 2.77* 3.63* 4.25* 5.22*       Imaging:  CT-CSPINE WITHOUT PLUS RECONS    Result Date: 5/24/2022 5/24/2022 11:38 AM HISTORY/REASON FOR EXAM: Fall and neck pain TECHNIQUE/EXAM DESCRIPTION: CT cervical spine without contrast, with reconstructions. The study was performed on a helical multidetector CT scanner. Thin-section helical scanning was performed from the skull base through T1. Sagittal and coronal multiplanar reconstructions were generated from the axial images. Low dose optimization technique was  utilized for this CT exam including automated exposure control and adjustment of the mA and/or kV according to patient size. COMPARISON:  None. FINDINGS: Alignment in the cervical spine is normal. There is no fracture or dislocation. The craniovertebral junction appears intact. The prevertebral and paraspinous soft tissues are unremarkable. There is moderate disc space. C6-7 level with marginal osteophytosis and moderate posterior spurring. The superior mediastinum and lung apices in the field of view are unremarkable.     1.  Moderate osteoarthritic changes at the C6-7 level with disc space narrowing and marginal osteophytosis. Further there is moderate cervical spondylotic changes at this level. 2.  No evidence of cervical spine fracture and/or subluxation.    CT-ABDOMEN-PELVIS WITH    Result Date: 5/24/2022 5/24/2022 11:39 AM HISTORY/REASON FOR EXAM:  trauma red. TECHNIQUE/EXAM DESCRIPTION:   CT scan of the abdomen and pelvis with contrast. Contrast-enhanced helical scanning was obtained from the diaphragmatic domes through the pubic symphysis following the bolus administration of nonionic contrast without complication. 75 mL of Omnipaque 350 nonionic contrast was administered without complication. Low dose optimization technique was utilized for this CT exam including automated exposure control and adjustment of the mA and/or kV according to patient size. COMPARISON: Complete abdominal sonogram, 9/14/2021. FINDINGS: Lower Chest: Dependent atelectasis in the lung bases. No pleural effusion or pneumothorax. No pericardial effusion. Liver: Normal. Spleen: Unremarkable. Pancreas: Unremarkable. Gallbladder: No calcified stones. Biliary: Nondilated. Adrenal glands: Normal. Kidneys: Absent right kidney. Right lower quadrant transplant kidney without hydronephrosis. Enlarged left kidney with 2 numerous to count fluid attenuation lesions throughout the cortex. Nonobstructing stones in the lower pole of the left kidney.  No hydronephrosis or hydroureter. Bowel: Sigmoid colon diverticulosis, without diverticulitis. Normal appendix. Lymph nodes: No adenopathy. Vasculature: Calcified atherosclerotic plaques in the aorta and iliac arteries, without aneurysmal dilation. Peritoneum: Unremarkable without ascites. Musculoskeletal: No acute or destructive process. Multilevel lumbar spondylosis. Pelvis: The bladder is decompressed by an indwelling Epps catheter.. Small umbilical hernia containing fat.     1. No acute posttraumatic findings in the abdomen or pelvis. 2. Bibasilar subsegmental atelectasis. 3. Right pelvic transplant kidney without hydronephrosis. 4. Polycystic left kidney. 5. Diverticulosis without diverticulitis. Normal appendix.    CT-EXTREMITY, LOWER W/O RIGHT    Result Date: 5/24/2022 5/24/2022 4:17 PM HISTORY/REASON FOR EXAM:  Soft tissue infection suspected, lower leg, no prior imaging; necrotizing fasciitis to right lower ext, recieved large amounts of contrast already with renal transplant. TECHNIQUE/EXAM DESCRIPTION AND NUMBER OF VIEWS: CT scan of the RIGHT lower extremity without contrast and including reconstructions. Thin-section noncontrast helical images were obtained. Coronal and sagittal reconstructions were generated from the axial images. Up to date radiation dose reduction adjustments have been utilized to meet ALARA standards for radiation dose reduction. COMPARISON: None. FINDINGS: Right total knee replacement. There is decreased bony mineralization of the right lower extremity. There is no evidence of ulceration or soft tissue mass in the right lower extremity. There is no evidence of large abscess formation. There is diffuse atherosclerotic calcification of the right superficial femoral artery. There are curvilinear regions of soft tissue attenuation throughout the subcutaneous tissues of the right mid and distal lower leg.     1.  Status post right total knee replacement. 2.  Diffuse atherosclerotic  calcification of the arterial system of the right lower extremity suspicious for diabetes mellitus. 3.  Soft tissue attenuation in the subcutaneous tissues of the mid to distal lower leg and foot suspicious for cellulitis. 4.  No evidence of soft tissue ulceration in the right lower leg or foot. 5.  No evidence of acute osteomyelitis in the right lower leg or foot. 6.  Small subcutaneous abscesses cannot be excluded without the use of intravenous contrast.    CT-HEAD W/O    Result Date: 5/24/2022 5/24/2022 11:38 AM HISTORY/REASON FOR EXAM:  trauma red. TECHNIQUE/EXAM DESCRIPTION AND NUMBER OF VIEWS: CT of the head without contrast. The study was performed on a helical multidetector CT scanner. Contiguous axial sections were obtained from the skull base through the vertex. Up to date radiation dose reduction adjustments have been utilized to meet ALARA standards for radiation dose reduction. COMPARISON:  None available FINDINGS: The calvariae and skull base are unremarkable. There are no extraaxial fluid collections. There is a pattern of cerebral atrophy manifest as enlargement of sulcal markings and ventricular prominence. The ventricular system and basal cisterns are otherwise unremarkable. There are no areas of abnormal density in the brain substance. There are no hemorrhagic lesions. There are no mass effects or shift of midline structures. The brainstem and posterior fossa structures are unremarkable. Paranasal sinuses in the field of view are unremarkable. Mastoids in the field of view are unremarkable.     1.  Cerebral atrophy. 2.  Otherwise, Head CT without contrast within normal limits. No evidence of acute cerebral infarction, hemorrhage or mass lesion.     DX-CHEST-LIMITED (1 VIEW)    Result Date: 5/25/2022 5/25/2022 7:52 AM HISTORY/REASON FOR EXAM:  Central line placement TECHNIQUE/EXAM DESCRIPTION AND NUMBER OF VIEWS: Single portable view of the chest. COMPARISON: Chest radiography, 5/25/2022 at 0719  hours FINDINGS: Lungs: Symmetric low lung volumes. Stable linear and patchy opacities are unchanged. Stable small left pleural effusion. The right costophrenic recess is sharp. No visible pneumothorax bilaterally. Mediastinum: Cardiac silhouette size remains enlarged. Other: The right internal jugular central venous access catheter placed in the interval terminates over the right atrium. The remaining visualized bones and soft tissues are stable radiographically.     1. Right internal jugular central venous access catheter placed in the interval terminates over the upper aspect of the right atrium. No postprocedure visible pneumothorax. 2. Stable patchy parenchymal opacities in the lungs, with a stable small left pleural effusion.    DX-CHEST-LIMITED (1 VIEW)    Result Date: 5/24/2022 5/24/2022 11:26 AM HISTORY/REASON FOR EXAM:  Chest Pain. TECHNIQUE/EXAM DESCRIPTION AND NUMBER OF VIEWS: Single AP view of the chest. COMPARISON:  Chest x-ray 11/13/2020 FINDINGS: Lungs:  There are curvilinear opacities in the lung fields bilaterally most pronounced in the perihilar regions. Pleura:  There is no pleural effusion or pneumothorax. Heart and mediastinum:  The heart silhouette is mildly enlarged Bones:  Normal     1.  Curvilinear perihilar opacities likely atelectasis and/or parenchymal scarring. 2.  Mild cardiomegaly.    DX-CHEST-PORTABLE (1 VIEW)    Result Date: 5/25/2022 5/25/2022 7:12 AM HISTORY/REASON FOR EXAM: Shortness of Breath TECHNIQUE/EXAM DESCRIPTION:  Single AP view of the chest. COMPARISON: Yesterday FINDINGS: Cardiomegaly is observed. The mediastinal contour appears within normal limits.  The central  pulmonary vasculature appears prominent and indistinct. Bilateral lung volumes are diminished.  Diffuse scattered hazy pulmonary parenchymal opacities are seen. Blunting of bilateral costophrenic angles is seen, compatible with trace bilateral pleural effusions. The bony structures appear age-appropriate.      1.  Pulmonary edema and/or infiltrates are identified, which are stable since the prior exam. 2.  Trace bilateral pleural effusions 3.  Cardiomegaly    DX-CHEST-PORTABLE (1 VIEW)    Result Date: 5/24/2022 5/24/2022 1:00 PM HISTORY/REASON FOR EXAM:  Central line placement. TECHNIQUE/EXAM DESCRIPTION AND NUMBER OF VIEWS: Single portable view of the chest. COMPARISON: Chest radiograph, 5/24/2022 at 1147 hours FINDINGS: Lungs: Stable curvilinear opacities in the lung fields, greatest in the perihilar regions, stable when compared to prior study. Stable bibasal parenchymal volume loss. No large volume pleural effusion or visible pneumothorax. No pulmonary vascular congestion or interstitial edema. Mediastinum: The cardiac silhouette size remains enlarged. Other: The left internal jugular central venous access catheter placed in the interval terminates over a persistent left superior vena cava. The remaining visualized bones and soft tissues are stable radiographically.     Left internal jugular central venous access catheter terminates over a persistent left superior vena cava. No postprocedure visible pneumothorax. The remainder is stable.    DX-PELVIS-1 OR 2 VIEWS    Result Date: 5/24/2022 5/24/2022 11:27 AM HISTORY/REASON FOR EXAM:  Pelvic/Hip Pain Following Trauma. TECHNIQUE/EXAM DESCRIPTION AND NUMBER OF VIEWS:  1 view(s) of the pelvis. COMPARISON:  None. FINDINGS: There is normal bony mineralization of the pelvis. There is no evidence of pelvic fracture or osseous lesion. The sacroiliac joints and pubic symphysis are symmetrical.     1.  Unremarkable single AP view of the pelvis.    DX-TIBIA AND FIBULA RIGHT    Result Date: 5/24/2022 5/24/2022 12:17 PM HISTORY/REASON FOR EXAM:  Pain/Deformity Following Trauma. TECHNIQUE/EXAM DESCRIPTION AND NUMBER OF VIEWS:  2 views of the RIGHT tibia and fibula. COMPARISON: Right knee radiography, 12/18/2006 FINDINGS: Bones: Postsurgical changes in the distal right femur  and proximal right tibia. Normal bone mineralization. No focal bone lesion. No acute fracture. Joint: Postsurgical changes of right total knee arthroplasty. Ankle mortise is preserved. No subluxation. Soft tissue: Arteriosclerosis. Circumferential right lower leg soft tissue swelling.     1. Right lower leg soft tissue swelling. 2. No acute fracture or subluxation. 3. Postsurgical changes of right total knee arthroplasty.    CT-CTA CHEST PULMONARY ARTERY W/ RECONS    Result Date: 5/24/2022 5/24/2022 11:38 AM HISTORY/REASON FOR EXAM:  Fall and chest pain TECHNIQUE/EXAM DESCRIPTION: CT angiogram scan for pulmonary embolism with contrast, with reconstructions. 1.25 mm helical sections were obtained from the lung apices through the lung bases following the rapid bolus administration of 75 mL of Omnipaque 350 nonionic contrast. Thin-section overlapping reconstruction interval was utilized. Coronal reconstructions were generated from the axial data. MIP post processing was performed and utilized for the interpretation. Low dose optimization technique was utilized for this CT exam including automated exposure control and adjustment of the mA and/or kV according to patient size. COMPARISON: None FINDINGS: Pulmonary Embolism: No. Main Pulmonary Arteries: No. Segmental branches: No. Subsegmental branches: No. Additional Comments: None. Lungs: There are curvilinear opacities dependently in the lung bases. Pleura: No pleural effusion. Nodes: No enlarged lymph nodes. Additional findings: Diffuse coronary artery calcification.     1.  No CT evidence of pulmonary emboli. 2.  Bibasilar atelectasis. 3.  No evidence of rib fracture. 4. Diffuse coronary artery calcification.    EC-ECHOCARDIOGRAM COMPLETE W/ CONT    Result Date: 5/25/2022  Transthoracic Echo Report Echocardiography Laboratory CONCLUSIONS No prior study is available for comparison. The left ventricular ejection fraction is visually estimated to be 35%. Unable to  estimate pulmonary artery pressure due to an inadequate tricuspid regurgitant jet. CIARA FORRESTER Exam Date:         2022                    11:35 Exam Location:     Inpatient Priority:          Routine Ordering Physician:        ALEJANDRA FROST Referring Physician: Sonographer:               Jack Lockwood RDCS, RVT Age:    66     Gender:    M MRN:    [id number] :    1955 BSA:    2.4    Ht (in):    77     Wt (lb):    235 Exam Type:     Complete Indications:     Shortness of breath ICD Codes:       786.05 CPT Codes:       11687 BP:   90     /   51     HR:   97 Technical Quality:       Technically difficult study -                          adequate information is obtained MEASUREMENTS  (Male / Female) Normal Values 2D ECHO LV Diastolic Diameter PLAX        4.6 cm                4.2 - 5.9 / 3.9 - 5.3 cm LV Systolic Diameter PLAX         4.1 cm                2.1 - 4.0 cm IVS Diastolic Thickness           1.2 cm                LVPW Diastolic Thickness          1.2 cm                LVOT Diameter                     2 cm                  Estimated LV Ejection Fraction    35 %                  LV Ejection Fraction MOD BP       38.5 %                >= 55  % LV Ejection Fraction MOD 4C       52.3 %                LV Ejection Fraction MOD 2C       40.7 %                IVC Diameter                      1.8 cm                DOPPLER AV Peak Velocity                  1.5 m/s               AV Peak Gradient                  9.5 mmHg              AV Mean Gradient                  5.7 mmHg              LVOT Peak Velocity                0.8 m/s               AV Area Cont Eq vti               1.6 cm2               MV Velocity Time Integral         13.5 cm               Mitral E Point Velocity           0.63 m/s              Mitral E to A Ratio               1.1                   MV Pressure Half Time             62.8 ms               MV Area PHT                       3.5 cm2                MV Deceleration Time              217 ms                TR Peak Velocity                  275 cm/s              PV Peak Velocity                  1.1 m/s               PV Peak Gradient                  4.6 mmHg              RVOT Peak Velocity                0.79 m/s              * Indicates values subject to auto-interpretation LV EF:  35    % FINDINGS Left Ventricle 3 ml of  contrast was used to evaluate for thrombus in the left ventricular apex. Normal left ventricular chamber size. Mild concentric left ventricular hypertrophy. Moderately reduced left ventricular systolic function. The left ventricular ejection fraction is visually estimated to be 35%.There is  regional wall motion abnormalities may be from old MI.indeterminate diastolic function. Right Ventricle Normal right ventricular size. Reduced right ventricular systolic function. Right Atrium Enlarged right atrium. Normal inferior vena cava size and inspiratory collapse. Left Atrium Normal left atrial size. Left atrial volume index is 21  mL/sq m. Mitral Valve The mitral valve is not well visualized. No mitral stenosis. Trace mitral regurgitation. Aortic Valve The aortic valve is not well visualized. No aortic valve stenosis. No aortic insufficiency. Tricuspid Valve The tricuspid valve is not well visualized. No tricuspid stenosis. Trace tricuspid regurgitation. Unable to estimate pulmonary artery pressure due to an inadequate tricuspid regurgitant jet. Pulmonic Valve The pulmonic valve is not well visualized. No pulmonic stenosis. Trace pulmonic insufficiency. Pericardium Normal pericardium without effusion. Aorta The ascending aorta diameter is 3.2  cm. Matthew Yoder MD (Electronically Signed) Final Date:     25 May 2022 14:37    [facility] (FOR ED USE ONLY)    Result Date: 5/24/2022 5/24/2022 12:01 PM HISTORY/REASON FOR EXAM:  Ground-level fall with traumatic injury TECHNIQUE/EXAM DESCRIPTION AND NUMBER OF VIEWS:  Limited ultrasound  "of the abdomen. FAST scan. Focused ultrasound of the 4 quadrants of the abdomen. COMPARISON: None FINDINGS: Focused ultrasound of the 4 quadrants of the abdomen demonstrates no evidence of free fluid in the 4 quadrants.     No free fluid seen in all 4 quadrants. Negative FAST scan.       Micro:  Results     Procedure Component Value Units Date/Time    BLOOD CULTURE x2 [[id number]]  (Abnormal) Collected: 05/24/22 1122    Order Status: Completed Specimen: Blood from Peripheral Updated: 05/25/22 1334     Significant Indicator POS     Source BLD     Site PERIPHERAL     Culture Result Growth detected by Bactec instrument. 05/24/2022  23:30      Escherichia coli    Narrative:      CALL  [facility]  19 tel. [phone],  CALLED  19 tel. [phone] 05/24/2022, 23:31, RB PERF. RESULTS CALLED TO: RN  [id number]  Per Hospital Policy: Only change Specimen Src: to \"Line\" if  specified by physician order.  Left Hand    BLOOD CULTURE x2 [[id number]]  (Abnormal) Collected: 05/24/22 1124    Order Status: Completed Specimen: Blood from Peripheral Updated: 05/25/22 1334     Significant Indicator POS     Source BLD     Site PERIPHERAL     Culture Result Growth detected by Bactec instrument. 05/24/2022  23:30      Escherichia coli    Narrative:      CALL  [facility]  19 tel. [phone],  CALLED  19 tel. [phone] 05/24/2022, 23:31, RB PERF. RESULTS CALLED TO:  [id number]  Per Hospital Policy: Only change Specimen Src: to \"Line\" if  specified by physician order.  Right Hand    CSF CULTURE [[id number]] Collected: 05/24/22 1325    Order Status: Completed Specimen: CSF from Tap Updated: 05/25/22 0909     Significant Indicator NEG     Source CSF     Site TAP     Culture Result No growth at 24 hours.     Gram Stain Result Rare WBCs.  No organisms seen.      GRAM STAIN [[id number]] Collected: 05/24/22 1325    Order Status: Completed Specimen: CSF Updated: 05/24/22 1545     Significant Indicator .     Source CSF     Site TAP     Gram Stain Result Rare WBCs.  No " "organisms seen.      BLOOD CULTURE x2 [[id number]]     Order Status: Sent Specimen: Blood from Peripheral     CULTURE WOUND W/ GRAM STAIN [[id number]]     Order Status: Canceled Specimen: Wound from Right Leg     URINALYSIS,CULTURE IF INDICATED [[id number]]  (Abnormal) Collected: 05/24/22 1245    Order Status: Sent Specimen: Urine, Cath Updated: 05/24/22 1323     Color Yellow     Character Clear     Specific Gravity 1.017     Ph 5.0     Glucose 500 mg/dL      Ketones Negative mg/dL      Protein Negative mg/dL      Bilirubin Negative     Urobilinogen, Urine 0.2     Nitrite Negative     Leukocyte Esterase Negative     Occult Blood Negative     Micro Urine Req see below     Comment: Microscopic examination not performed when specimen is clear  and chemically negative for protein, blood, leukocyte esterase  and nitrite.         Narrative:      Indication for culture:->Acute unexplained altered mental  status ONLY after ruling out other recognized cause    COV-2, FLU A/B, AND RSV BY PCR (2-4 HOURS CEPHEID): Collect NP swab in [place] [[id number]] Collected: 05/24/22 1130    Order Status: Completed Specimen: Respirate Updated: 05/24/22 1243     Influenza virus A RNA Negative     Influenza virus B, PCR Negative     RSV, PCR Negative     SARS-CoV-2 by PCR NotDetected     Comment: PATIENTS: Important information regarding your results and instructions can  be found at [email or web address]   \"After your  Covid-19 Test\"    [facility] providers: PLEASE REFER TO DE-ESCALATION AND RETESTING PROTOCOL  on [email or web address]    **The [employer] GeneXpert Xpress SARS-CoV-2 RT-PCR Test has been made  available for use under the Emergency Use Authorization (EUA) only.          SARS-CoV-2 Source NP Swab          Assessment:  66 y.o. man with a history of uncontrolled type 2 diabetes mellitus, history of prior renal transplant on chronic immunosuppression, stage III chronic kidney disease, atrial fibrillation on " anticoagulation, and prior right total knee arthroplasty admitted on 5/24/2022 secondary to altered mentation in the setting of a ground-level fall and hit his right shin.  Patient found to be in septic shock with gram-negative bacteremia and right lower extremity cellulitis without any evidence of necrotizing fasciitis.  His right lower extremity cellulitis appears to be improving.    On the morning of 5/26 was noted to be obtunded with respiratory distress so was intubated.    Pertinent diagnoses:  Septic shock, remains on pressors  Ventilatory dependent respiratory failure, intubated on 5/26  E. coli bacteremia, source possibly below  Right lower extremity cellulitis  Acute kidney injury on stage III chronic kidney disease  Rhabdomyolysis  Leukocytosis, increased today  Thrombocytopenia  Acute encephalopathy  Uncontrolled type 2 diabetes mellitus, hemoglobin A1c 10.7  History of right total knee arthroplasty  History of renal transplant on tacrolimus, mycophenolate and prednisone  Recent fall    Plan:  -Continue IV Zosyn, however will change 3.375 g to every 12 hours based on renal function  -Follow blood cultures on 5/24-E coli, resistant to ampicillin, intermediate resistant to Unasyn  -Diabetes education and blood sugar control  -Avoid nephrotoxins  -Continue supportive care by primary team  -Nephrology following-plans for dialysis     Prognosis guarded    Plan of care discussed with intensivist, Dr. Swartz and family at bedside

## 2022-05-26 NOTE — CARE PLAN
The patient is Unstable - High likelihood or risk of patient condition declining or worsening    Shift Goals  Clinical Goals: hemodynamic stability  Patient Goals: rest, pain management (Patient very fatigued, difficult to determine patient goals)  Family Goals: no family present    Progress made toward(s) clinical / shift goals:    Problem: Knowledge Deficit - Standard  Goal: Patient and family/care givers will demonstrate understanding of plan of care, disease process/condition, diagnostic tests and medications  Outcome: Progressing     Problem: Pain - Standard  Goal: Alleviation of pain or a reduction in pain to the patient’s comfort goal  Outcome: Progressing     Problem: Skin Integrity  Goal: Skin integrity is maintained or improved  Outcome: Progressing     Problem: Fall Risk  Goal: Patient will remain free from falls  Outcome: Progressing     Problem: Safety - Medical Restraint  Goal: Remains free of injury from restraints (Restraint for Interference with Medical Device)  Outcome: Progressing       Patient is not progressing towards the following goals:      Problem: Safety - Medical Restraint  Goal: Free from restraint(s) (Restraint for Interference with Medical Device)  Outcome: Not Progressing

## 2022-05-26 NOTE — PROGRESS NOTES
"[facility] -  PROGRESS NOTE               Author: James Sheppard M.D. Date & Time: 5/26/2022  10:45 AM     HPI:  Patient is a 66 year old male with a PMHx of renal transplant 9/10/2010 for polycystic kidney disease, DM, thrombocytopenia, CKD3b, pafib, covid19 infection, who presents for AMS.  He was brought in activated trauma for fall, CT imaging has been negative, but was in severe septic shock started on pressors and give nfluid boluses.  On broad spectrum abx.  Currently complains of right leg pain but thinks its better.  Confirms his last dose of tacrolimus and cellcept was yesterday.  Currently on levophed    DAILY NEPHROLOGY SUMMARY:  5/24: Consult done  5/25: NAEO, no complaints, feels a bit better, family at bedside  5/26: Decompensated overnight, intubated due to resp. Distress and pressors initiated    REVIEW OF SYSTEMS:    10 point ROS reviewed and is as per HPI/daily summary or otherwise negative    PMH/PSH/SH/FH:   Reviewed and unchanged since admission note    CURRENT MEDICATIONS:   Reviewed from admission to present day    VS:  /64   Pulse (!) 119   Temp 37.1 °C (98.8 °F) (Bladder)   Resp (!) 0   Ht 1.956 m (6' 5\")   Wt 122 kg (268 lb 15.4 oz)   SpO2 98%   BMI 31.89 kg/m²     Physical Exam  Nursing note reviewed.   Constitutional:       Appearance: He is ill-appearing.      Interventions: He is intubated.   Eyes:      General: No scleral icterus.  Cardiovascular:      Comments: No edema  Pulmonary:      Effort: Pulmonary effort is normal. He is intubated.   Abdominal:      General: There is no distension.   Musculoskeletal:         General: No deformity.   Skin:     Findings: Lesion (RLE with open ulcer) present. No rash.   Neurological:      Mental Status: He is alert.   Psychiatric:         Behavior: Behavior normal.       Fluids:  In: 1176 [I.V.:847.2]  Out: 255     LABS:  Recent Labs     05/25/22  0215 05/26/22  0359 05/26/22  0917   SODIUM 138 140 143 "   POTASSIUM 4.6 6.2* 5.8*   CHLORIDE 103 103 105   CO2 21 19* 17*   GLUCOSE 250* 96 130*   BUN 42* 61* 62*   CREATININE 4.25* 5.22* 5.31*   CALCIUM 7.4* 7.1* 6.5*     IMAGING:   All imaging reviewed from admission to present day    IMPRESSION:  # SANKET  - Etiology likely 2/2 ATN  - SCr rising  - Non-oliguric, but UOP dropping it seems  - TempCath placed by ICU  # CKD Stage 3b  - BCr ~ 1.5-1.9  - Etiology likely 2/2 HTN/DM  # E. Coli bacteremia  - Source unclear  - Abx on board  # Hyperkalemia  - No ECG changes  # AMS  - Etiology likely 2/2 hypoperfusion/sepsis  # Acute respiratory failure  - Intubated 5/26  # DDKT  - Etiology 2/2 ADPKD  - XPL in 2010  - On Tac/MMF/Pred regimen but being held due to sepsis  # Hypotension  - Etiology unclear  # RLE ulcer  - Hit his shin last weekend, could be source of his sepsis  # type 2 DM    PLAN:  - iHD today  - UF as tolerated gently  - Daily labs  - Monitor I/O  - Maintain MAP > 65  - Continue holding IS meds for now  - Dose all meds per eGFR < 15    I have personally reviewed all laboratory values, microbiology studies, radiographic images, and current medications.  The patient is critically ill with one or more organ system(s) failing and without intervention, survival is jeopardized.  To prevent further life threatening deterioration and/or organ failure, I have spent Critical Care time in direct patient care/floor time, of HIGH Medical decision making, exclusive of procredures, and without overlapping physician care.    Brief Description of CC services performed:  Evaluation of notes from various team members for RRT needs and arrangement of it if needed.  Discussion with staff regarding renal care and plan moving forward.    Total CC Time = 31 minutes

## 2022-05-26 NOTE — PROGRESS NOTES
0905 MD at bedside for bronch with RT and this RN. Time out done prior to procedure patient VSS after procedure.

## 2022-05-26 NOTE — PROCEDURES
Intubation    Date/Time: 5/26/2022 10:12 AM  Performed by: Reuben Swartz M.D.  Authorized by: Reuben Swartz M.D.     Consent:     Consent obtained:  Emergent situation    Consent given by:  Patient    Risks discussed:  Aspiration, bleeding, hypoxia and laryngeal injury    Alternatives discussed:  No treatment  Pre-procedure details:     Patient status:  Altered mental status    Mallampati score:  I    Pretreatment meds: etomidate.    Paralytics:  Rocuronium  Procedure details:     Preoxygenation: oxy mask.    Intubation method:  Oral    Oral intubation technique:  Video-assisted    Laryngoscope type:  GlideScope    Laryngoscope blade:  Mac 4    Cormack-Lehane Classification:  Grade 1    Tube size (mm):  8.0    Tube type:  Cuffed    Number of attempts:  1    Ventilation between attempts: no      Cricoid pressure: no      Tube visualized through cords: yes    Placement assessment:     ETT to teeth:  27    Tube secured with:  ETT robles    Breath sounds:  Equal    Placement verification: chest rise, condensation, CXR verification and direct visualization      CXR findings:  ETT in proper place  Post-procedure details:     Patient tolerance of procedure:  Tolerated well, no immediate complications

## 2022-05-26 NOTE — PROGRESS NOTES
[facility] ICU Progress Note      Admit Date: 5/24/2022    Resident(s): Pepe Stuart M.D.   Attending:  FRANCISCO MARKS/ Dr. Swartz    Patient ID:    Name:  Jama Altman   YOB: 1955  Age:  66 y.o.  male   MRN:  [id number]    Hospital Course (carried forward and updated):  Jama Altman is a 66 y.o. male with a past medical history of type 2 diabetes mellitus, stage IIIb chronic kidney disease, atrial fibrillation,who presented to the emergency department after a ground-level fall. Family at bedside state that they went to his home to visit and he was able to walk to the door to get them , then walked to his bed. He complained of RLE pain . No c/o fever, chills, n/v, urinary complaints, nor headaches. Family member reports had an episode of loose fecal incontinence   ED course blood pressure 96/50 (down to 60s/40s), respirations 30, pulse 145, O2 88% labs CBC hemoglobin 13.7 platelet 119 procalcitonin 33 lactic acid 9.8 urinalysis unremarkable COVID and flu negative blood cultures pending CSF studies pending (protein, glucose, cell count, culture, meningitis/encephalitis panel); chest x-ray cardiomegaly, atelectasis and/or parenchymal scarring pelvis imaging unremarkable; CT head cerebral atrophy, no acute infarction/hemorrhage, mass; osteoarthritis at C6-7 with osteophytosis and spondylotic changes negative for fractures CTA chest negative, no rib fractures, imaging does note diffuse coronary artery calcification; bibasilar subsegmental atelectasis, right pelvic transplant kidney without hydronephrosis, polycystic left kidney, diverticulosis without diverticulitis noted fast ultrasound negative; Medications given in ED ceftriaxone Zosyn and vancomycin, lidocaine, NS bolus 1500, hydrocortisone succinate     Consultants:  Critical Care  Infectious Disease  Nephrology    Interval Events:    Patient obtunded this morning, minimally responsive.   Noted to have stridor and signs of respiratory distress.    Blood cultures growing E. Coli.   ID recommended Zosyn, started yesterday.   Nephrology following, no plans for dialysis as of yet.  Worsening renal function.     Vitals Range last 24h:  Temp:  [37.1 °C (98.8 °F)] 37.1 °C (98.8 °F)  Pulse:  [] 93  Resp:  [9-40] 25  BP: ()/(50-65) 104/56  SpO2:  [92 %-99 %] 93 %      Intake/Output Summary (Last 24 hours) at 5/26/2022 0756  Last data filed at 5/26/2022 0600  Gross per 24 hour   Intake 1175.95 ml   Output 255 ml   Net 920.95 ml      ROS  Patient unable to meaningfully communicate at this time.     PHYSICAL EXAM:  Vitals:    05/26/22 0600 05/26/22 0615 05/26/22 0630 05/26/22 0645   BP: 104/56 104/54 108/59 104/56   Pulse: 89 91 93 93   Resp: (!) 32 (!) 24 (!) 25 (!) 25   Temp: 37.1 °C (98.8 °F)      TempSrc: Bladder      SpO2: 93% 93% 93% 93%   Weight:       Height:        Body mass index is 31.89 kg/m².    O2 therapy: Pulse Oximetry: 93 %, O2 (LPM): 8, O2 Delivery Device: Simple Mask      Physical Exam  Constitutional:       Appearance: He is obese. He is ill-appearing, toxic-appearing and diaphoretic.   HENT:      Head: Normocephalic.      Nose: Nose normal.      Mouth/Throat:      Mouth: Mucous membranes are dry.   Eyes:      Pupils: Pupils are equal, round, and reactive to light.   Cardiovascular:      Rate and Rhythm: Regular rhythm. Tachycardia present.      Pulses: Normal pulses.      Heart sounds: Murmur heard.     No friction rub. No gallop.   Pulmonary:      Effort: Respiratory distress present.      Breath sounds: Stridor present. No wheezing or rales.      Comments: Retractions and accessory muscle use, on supplemental oxygen delivered via face mask  Abdominal:      General: Abdomen is flat. There is no distension.      Tenderness: There is no abdominal tenderness. There is no guarding.   Genitourinary:     Penis: Normal.    Musculoskeletal:      Right lower leg: No edema.      Left lower leg: No edema.      Comments: RLE erythema, covered in  dressing and pressure stocking wrap   Skin:     General: Skin is warm.      Coloration: Skin is not jaundiced.   Neurological:      Mental Status: He is alert.      Comments: Opens eyes to noise, does not follow commands, no meaningful communication           Recent Labs     05/24/22 1938 05/25/22 0215 05/26/22 0359   SODIUM 139 138 140   POTASSIUM 4.6 4.6 6.2*   CHLORIDE 106 103 103   CO2 17* 21 19*   BUN 37* 42* 61*   CREATININE 3.63* 4.25* 5.22*   CALCIUM 7.9* 7.4* 7.1*     Recent Labs     05/24/22 1122 05/24/22 1938 05/25/22 0215 05/26/22 0359   ALTSGPT 22 --  25 38   ASTSGOT 24  --  40 42   ALKPHOSPHAT 75  --  42 51   TBILIRUBIN 0.9  --  0.5 0.7   GLUCOSE 192* 230* 250* 96     Recent Labs     05/24/22 1122 05/24/22 1938 05/25/22 0215 05/26/22 0359   RBC 4.67* 4.31* 4.20* 4.90   HEMOGLOBIN 13.7* 12.8* 12.3* 14.4   HEMATOCRIT 43.9 40.7* 40.1* 47.1   PLATELETCT 119* 112* 93* 76*   PROTHROMBTM 14.5  --   --   --    APTT 30.7  --   --   --    INR 1.16*  --   --   --      Recent Labs     05/24/22 1122 05/24/22 1938 05/25/22 0215 05/26/22 0359   WBC 8.6 12.4* 13.1* 24.5*   NEUTSPOLYS  --  87.00* 62.80 79.80*   LYMPHOCYTES  --  1.70* 4.40* 1.70*   MONOCYTES  --  0.90 2.60 8.80   EOSINOPHILS  --  0.00 0.00 0.00   BASOPHILS  --  0.00 0.00 0.00   ASTSGOT 24  --  40 42   ALTSGPT 22  --  25 38   ALKPHOSPHAT 75  --  42 51   TBILIRUBIN 0.9  --  0.5 0.7       Meds:  • calcium GLUConate-NaCl  1 g     • sodium chloride  2 Spray     • insulin regular  3-14 Units      And   • dextrose bolus  25 g     • heparin  5,000 Units     • dakins 0.125% (1/4 strength)       • furosemide  100 mg     • norepinephrine (Levophed) infusion  0-30 mcg/min 14 mcg/min (05/26/22 0550)   • piperacillin-tazobactam  4.5 g 4.5 g (05/26/22 0611)   • hydrocortisone sodium succinate PF  50 mg     • HYDROmorphone  0.5 mg          Procedures:  None    Imaging:  EC-ECHOCARDIOGRAM COMPLETE W/ CONT   Final Result      DX-CHEST-LIMITED (1 VIEW)    Final Result         1. Right internal jugular central venous access catheter placed in the interval terminates over the upper aspect of the right atrium. No postprocedure visible pneumothorax.   2. Stable patchy parenchymal opacities in the lungs, with a stable small left pleural effusion.      DX-CHEST-PORTABLE (1 VIEW)   Final Result         1.  Pulmonary edema and/or infiltrates are identified, which are stable since the prior exam.   2.  Trace bilateral pleural effusions   3.  Cardiomegaly      CT-EXTREMITY, LOWER W/O RIGHT   Final Result      1.  Status post right total knee replacement.      2.  Diffuse atherosclerotic calcification of the arterial system of the right lower extremity suspicious for diabetes mellitus.      3.  Soft tissue attenuation in the subcutaneous tissues of the mid to distal lower leg and foot suspicious for cellulitis.      4.  No evidence of soft tissue ulceration in the right lower leg or foot.      5.  No evidence of acute osteomyelitis in the right lower leg or foot.      6.  Small subcutaneous abscesses cannot be excluded without the use of intravenous contrast.      DX-CHEST-PORTABLE (1 VIEW)   Final Result      Left internal jugular central venous access catheter terminates over a persistent left superior vena cava. No postprocedure visible pneumothorax.      The remainder is stable.      DX-TIBIA AND FIBULA RIGHT   Final Result      1. Right lower leg soft tissue swelling.   2. No acute fracture or subluxation.   3. Postsurgical changes of right total knee arthroplasty.      US-ABDOMEN F.A.S.T. LTD (FOR ED USE ONLY)   Final Result      No free fluid seen in all 4 quadrants.      Negative FAST scan.            CT-ABDOMEN-PELVIS WITH   Final Result      1. No acute posttraumatic findings in the abdomen or pelvis.   2. Bibasilar subsegmental atelectasis.   3. Right pelvic transplant kidney without hydronephrosis.   4. Polycystic left kidney.   5. Diverticulosis without  diverticulitis. Normal appendix.      CT-CTA CHEST PULMONARY ARTERY W/ RECONS   Final Result      1.  No CT evidence of pulmonary emboli.      2.  Bibasilar atelectasis.      3.  No evidence of rib fracture.      4. Diffuse coronary artery calcification.      CT-HEAD W/O   Final Result      1.  Cerebral atrophy.      2.  Otherwise, Head CT without contrast within normal limits. No evidence of acute cerebral infarction, hemorrhage or mass lesion.         CT-CSPINE WITHOUT PLUS RECONS   Final Result      1.  Moderate osteoarthritic changes at the C6-7 level with disc space narrowing and marginal osteophytosis. Further there is moderate cervical spondylotic changes at this level.      2.  No evidence of cervical spine fracture and/or subluxation.      DX-PELVIS-1 OR 2 VIEWS   Final Result      1.  Unremarkable single AP view of the pelvis.      DX-CHEST-LIMITED (1 VIEW)   Final Result      1.  Curvilinear perihilar opacities likely atelectasis and/or parenchymal scarring.      2.  Mild cardiomegaly.          ASSESSEMENT and PLAN:  Septic shock secondary to bacteremia (present on admission)  Assessment & Plan  SIRS criteria identified on evaluation include: Tachycardia, with heart rate greater than 90 BPM and Tachypnea, with respirations greater than 20 per minute. Source is likely cellulitis from right lower extremity wound although not definitive (of note is ortho surgery stated also more consistent with cellulitis, not necrotizing fasciitis). Sepsis protocol initiated, fluid resuscitation ordered per protocol. Blood culture x 2 growing E. Coli.   - Infectious disease consulted, appreciate the recommendations  - Zosyn 4.5 g IV Q8H per ID  - Blood culture sensitivities pending   - Requires level of care of ICU as needs pressors for hemodynamic support    Acute respiratory failure with hypoxia  Assessment & Plan  Patient developed acute worsening of oxygenation status, noted to have stridor/upper airway sounds and  obtunded on examination. No longer able to protect airway.   - Planned intubation this AM for airway protection    Acute tubular necrosis (present on admission)  Assessment & Plan  ATN has occurred in the setting of shock, fluid resuscitation has not improved his urine output. Creatinine continues to worsen.   - Nephrology consulted, appreciate the recommendations   - Continue Solucortef for Hx of renal transplant  - Continue to hold Tacrolimus and CellCept in the setting of renal failure (on these in the setting of renal transplant in 2010 for polycystic kidney)    Acute on chronic systolic heart failure (present on admission)  LVEF 35% echo 5/25. Unable to calculate pulmonary artery pressure, indeterminate diastolic function.   - Lasix 100 mg IV BID    Lactic acidosis (present on admission)  Assessment & Plan  Improving lactic acid level.  - CTM labs     Altered Mental Status (present on admission)  Assessment & Plan  Likely in setting of sepsis. CSF studies culture no growth thus far, RBC count 3000 glucose elevated at 111 protein elevated at 78, viral panel negative, gram stain rare WBCs chest x-ray unremarkable. No acute CT head abnormalities.  - Refer to septis work-up above     Pulmonary edema (present on admission)  Assessment & Plan  Recent x-ray noting pulmonary edema and/or infiltrates, stable since prior, and bilateral pleural effusions.  - Intubation planned this morning as above  - Lasix 100 mg IV BID     Hyperlipidemia (present on admission)  Assessment & Plan  History of.  -Holding home meds     Diabetes mellitus type 2 (present on admission)  Assessment & Plan  A1c 10.7 on admission.   - SSI  - Hypoglycemia protocol     Atrial Fibrillation (present on admission)  Assessment & Plan  - Holding home beta-blocker   - Not on anticoagulant, discuss risk versus benefits when able     Ground-level fall (present on admission)  Assessment & Plan  No acute trauma on CT imaging. CPK elevated at 684 on  admission. Surgery assessed and signed off.     DISPO: Intubation planned, continued ICU-level care.     Pepe Stuart M.D.

## 2022-05-26 NOTE — PROGRESS NOTES
"Critical Care Medicine Faculty Progress Note    HPI: 66y M Hx of renal transplant 9/10/2010 for end stage polycystic kidney dz maintain on prednisone, tacrolimus and mcyophenolate, DM, chronic thrombocytopenia, CKD 3B, Pafib, bronchitis, Covid 19 11/2020. That on Sunday hit his shin. He since has been complaining of pain. This morning he called his younger brother. His brother went to his house found him altered. He was brought in by EMS as a trauma activation since he had AMS and bruising from his fall. CT head negative, CTA chest negative, CT abdomen negative, CT spine negative other then degenerative findings. He was in severe septic shock, started on pressors and fluid bolus. He was given broad spectrum microbials. His only compliant is his right leg. LP is currently pending and was traumatic. He would like to be full code.     Exam:lethargic this morning, bruising to left forehead and scratch to right frontal area, lungs labored with increase respiratory distress crackles, heart irregular without murmurs, abdomen soft nttp, ext without edema. Right lower shin with bruising and small old upside down \"U\" cut scabbed no fluctuance, no blistering, warmth to leg, no groin adenopathy, no anesthesia, tenderness with passive range of motion. Neuro he does awake and answer appropriately moves all exts.     Rounding Report:  Neuro: would awake, poor secretions and lethargic, one pain  HR: afib 's  SBP: norepi at 60 stopped vaso  Tmax: afebrile  GI: Cortrac start feeding, BM 5/26  UOP: 100ml overnight  Lines: right radial marguerite, central, dialysis, fernandez   Resp: vent day 1 240 530 14 100% abg pending   Vte: heparin gtt rebolous  PPI/H2:pepcid  Antibx: E Coli bacteremia, Zosyn day     A/P:  Serial BMP, continue with Lasix 100mg BID  Calcium  Stop vassopressin and use norepinephrine  Anticoagulation for afib  Likely will need intubation  artline  Check glucose was 98 check abg  k was treated  Intubated, art line, " dialysis  Dex and dilaudid  CXR reviewed with radiology will check CBC, due bronchoscopy and u/s lung with potential aspiration  Bronchoscopy preformed  Repeat CXR in 1 hour  Adjust ventilation inc RR    Severe septic shock likely related to severe soft tissue infection: Concerns for Toxic shock syndrome with his erythroderma  Empiric rx clinda + zosyn +linezolid continue clinda for 48-72hrs beyond shock period one study shows increase mortality with this  Monitor need for surgical debridement appreciate consultation: pain disproportionate to exam, pain/edema beyond erythema, anesthesia, crepitus, blistering/bullae, hyponatremia, rapid progression, gas on imaging or facial edema or enhancement (avoid using LRINEC score poor sensitivity)  Consider Bx of tissue to rule out fasciitis/myositis or cut down looking for dishwater color fluid  Fluid therapy with resus and norepinephrine for map > 65    Septic Shock: s/p fluid resus, monitor end organ, lactate continue with norepinephrine and vasopressin    E Coli Bacteremia: Blood 2/2 5/24 follow up speciation Continue broad spectrum antibiotics until speciation and potential polymycorbial, ID consulting    Chronic immunosuppression: hold with severe life threatening sepsis   Stress dose steroids for relative adrenal insufficiency. Tacrolimius level 5.4 3/2022 follow repeat level on admission.     Encephalopathy: improved likely hypoperfusion and septic. Follow up LP and serial neuro exam. LP negative for meningitis    Thrombocytopenia: chronic serial monitor monitor for bleeding, trend    SANKET on CKD: Hx of CKD s/p renal transplant, acute renal injury related to septic shock and ischemic atn s/p contrast die load, avoid nephrotoxins, check CPK, nephrology consultation, avoid hyperchloremic resus fluid.     Lactic acidosis: due to sepsis serial monitor.     DM: aggressive glucose control for better source of infection consider insulin gtt. HgA1C 10.7.    Hypoxic respiratory  failure: 12L oxy mask, careful with volume expansion, monitor for need for intubation. IS, mobilize and pulmonary hygiene, aspiration precautions. Start Force diuresis IVC and Right IJ dilated and large.     Volume overload: Net + 9L in first 24 hrs, careful with volume expansion and judicious fluid management.  Force diuresis with lasix 100mg IV BID    Hyperkalemia: continue serial BMP, discuss with nephrology timing of dialysis    Cardiomyopathy: likely septic vs ischemic and old AMI in nature wean off vasopressin and use norepinephrine for ionopressor, EF 35% with LVH    Paroxysmal Atrial fibrillation: hx of continue to optimize filling pressure and electrolytes, he is rate controlled, start anticoagulation.     Acute hypoxic hypercarbic respiratory failure: intubated 5/26 for airway protection and respiratory failure, A-F bundle, daily SAT/SBT, aspiration precautions.     Patient remains in critical condition from septic shock and severe soft tissue skin infection and titration of norepinephrine gtt vasopressin and ventilator and multiple bedside evaluation and family updates. Critical care time provided was 140 minutes. This excludes all separate billable procedures.     Please see [facility] notes for additional documentation    Reuben Swartz MD  Critical Care Medicine

## 2022-05-26 NOTE — PROGRESS NOTES
Monitor summary: A fib rhythm, RN reviewed measurements and agrees with measurements except for CO interval.

## 2022-05-26 NOTE — PROCEDURES
Date: 5/26/2022    Procedure: Bronchoscopy with Therapeutic suctioning and BAL    Indication: Infiltrate/atlectasis    Physician:  Reuben Swartz M.D.    Consent:  Emergent for airway collapse and concerns for hemothorax    Procedure:  A time out occurred with the patient name, medical record number, allergies, and consent with right procedure and indication with bedside nurse and if able the patient. The patient was connected to a monitor and had continuous pulse oximeter. The patient was sedated with ativan. The bronchoscope was insert down the left and right bronchi to the terminal bronchioles. Therapeutic suction of secretion was provided right main bronchus, right middle lobe and right upper lobe with white thick secretions. A BAL was preformed in the RLL. The patient tolerated the procedure without any immediate complications with minimal <5ml of blood loss.     Findings white salvia appearing secretions down right upper, middle and lower bronchus, some mild friability.     eRuben Swartz MD  Critical Care Medicine

## 2022-05-26 NOTE — PROGRESS NOTES
Cortrak Placement    Tube Team verified patient name and medical record number prior to tube placement.  Cortrak tube (43 inches, 12 Hungarian) placed at 95 cm in right nare.  Per Cortrak picture, tube appears to be in the small bowel.  Nursing Instructions: Awaiting KUB to confirm placement before use for medications or feeding. Once placement confirmed, flush tube with 30 ml of water, and then remove and save stylet, in patient medication drawer.

## 2022-05-26 NOTE — DIETARY
"Nutrition Support Assessment:  Day 2 of admit.  Jama Altman is a 66 y.o. male with admitting DX of Septic shock.     Current problem list:  1. Septic shock secondary to bacteremia  2. Acute respiratory failure with hypoxia  3. Acute tubular necrosis  4. Acute on chronic systolic heart failure  5. Lactic acidosis  6. Altered Mental Status  7. Pulmonary edema  8. Hyperlipidemia  9. Diabetes mellitus type 2  10. Atrial fibrillation  11. Ground level fall     Assessment:  Estimated Nutritional Needs based on:   Height: 195.6 cm (6' 5\")  Weight: 122 kg (268 lb 15.4 oz)  Weight to Use in Calculations: 107 kg (236 lb 1.8 oz) - first bed scale weight, consistent with previous weights per chart review.  Ideal Body Weight: 94.3 kg (208 lb)  BMI with weight 107.1 kg = 28, BMI classification: Overweight    Calculation/Equation: PSU (VE 9.9, Tmax 36.7C) = 2118 kcal. REE x 1.0 = 1975 kcal  Total Calories / day: 2000 - 2200  (Calories / k.6 - 20.5)  Total Grams Protein / day: 86 - 107  (Grams Protein / k.8 - 1)     Evaluation:   1. Pt intubated. Start of enteral nutrition support appropriate.  2. Cortrak placed to pylorus/duodenal bulb.  3. Pt with history of renal transplant, CKD, Diabetes.  4. Labs  include Na 143, K+ 5.8 (H), Glu 130 (H), BUN 62 (H), Creat 5.31 (H), Alb 3 (L)  5. Medications include Precedex, Levophed at 60 mcg/min, Vasopressin at 0.03 units/min, Lasix, SSI, Zosyn  6. LBM   7. Skin: full thickness wound RLE with drainage.  8. As pt with SANKET and K+ 5.8, renal formula Novasource Renal appropriate to meet needs. Novasource Renal is fiber free for pt on pressors.     Malnutrition Risk: Unable to fully assess.     Recommendations/Plan:  1. Start Novasource Renal and advance to goal rate of 45 ml/hour to provide 2160 kcal, 98 gm protein, and 767 ml free water in 24 hours.  2. Fluids per MD.  3. Monitor weight.    RD following              "

## 2022-05-26 NOTE — CARE PLAN
Problem: Ventilation  Goal: Ability to achieve and maintain unassisted ventilation or tolerate decreased levels of ventilator support  Description: Target End Date:  4 days     Document on Vent flowsheet    1.  Support and monitor invasive and noninvasive mechanical ventilation  2.  Monitor ventilator weaning response  3.  Perform ventilator associated pneumonia prevention interventions  4.  Manage ventilation therapy by monitoring diagnostic test results  Outcome: Not Progressing      Ventilator Daily Summary    Vent Day # 1  8 @ 27    APVCMV: 28/530/+8/90    Ventilator settings changed this shift: No    Weaning trials: No    Respiratory Procedures: No    Plan: Continue current ventilator settings and wean mechanical ventilation as tolerated per physician orders.

## 2022-05-26 NOTE — PROGRESS NOTES
0745  Dr Swartz to bedside to assess pt.  Pt being prepared for intubation, HD line placement and art line placement.  Pt family notified via phone by bedside RN, consent obtained.  0749 Time out completed for intubation, HD placement, and art line placement.  See procedure charting for details and medication administration.    0751 intubation with +color change  0758 norepi increased to 60mcg/hr per Dr Swartz request.  0800 guidewire out for HD cath, and line in at 0803  0805 500cc NS bolus initiated  0812 arterial line in and guidewire out

## 2022-05-26 NOTE — CARE PLAN
Problem: Skin Integrity  Goal: Skin integrity is maintained or improved  Outcome: Not Progressing     Problem: Knowledge Deficit - Standard  Goal: Patient and family/care givers will demonstrate understanding of plan of care, disease process/condition, diagnostic tests and medications  Outcome: Progressing     Problem: Pain - Standard  Goal: Alleviation of pain or a reduction in pain to the patient’s comfort goal  Outcome: Progressing     Problem: Fall Risk  Goal: Patient will remain free from falls  Outcome: Progressing   The patient is Watcher - Medium risk of patient condition declining or worsening    Shift Goals  Clinical Goals: hemodynamic stability  Patient Goals: rest, pain management (Patient very fatigued, difficult to determine patient goals)  Family Goals: no family present    Progress made toward(s) clinical / shift goals:  Patient skin integrity is not improving, Patient has open wound on shin, patient has growing understanding of care plan. Patient pain is adequately controlled, patient has remained free from falls during hospital stay.    Patient is not progressing towards the following goals:      Problem: Skin Integrity  Goal: Skin integrity is maintained or improved  Outcome: Not Progressing

## 2022-05-26 NOTE — PROCEDURES
Central Line Insertion    Date/Time: 5/26/2022 10:14 AM  Performed by: Reuben Swartz M.D.  Authorized by: Reuben Swartz M.D.     Consent:     Consent obtained:  Emergent situation    Consent given by:  Patient    Risks discussed:  Arterial puncture, bleeding, infection and pneumothorax    Alternatives discussed:  No treatment  Pre-procedure details:     Hand hygiene: Hand hygiene performed prior to insertion      Skin preparation:  ChloraPrep    Skin preparation agent: Skin preparation agent completely dried prior to procedure    Anesthesia:     Anesthesia method:  None  Procedure details:     Location:  L internal jugular    Patient position:  Reverse Trendelenburg    Procedural supplies:  Triple lumen (13 fr trialysis catheter)    Catheter size:  9 Fr    Landmarks identified: yes      Ultrasound guidance: yes      Sterile ultrasound techniques: Sterile gel and sterile probe covers were used      Number of attempts:  2    Successful placement: yes    Post-procedure details:     Post-procedure:  Dressing applied    Assessment:  Blood return through all ports, free fluid flow and no pneumothorax on x-ray    Patient tolerance of procedure:  Tolerated well, no immediate complications  Comments:      US guided trialysis catheter placed with guide wire removed

## 2022-05-26 NOTE — PROGRESS NOTES
Updated family at bedside of diagnosis and plan of care they have asked we transition to comfort focused care. Orders placed.     Reuben Swartz MD  Critical Care Medicine

## 2022-05-26 NOTE — DISCHARGE PLANNING
Chart review and assessment completed. No case management or discharge needs at this time. Patient remains critical and needs continued ICU level of care. LMSW to follow and assist once needs arise.     Care Transition Team Assessment    Information Source  Orientation Level: Unable to assess  Information Given By: Other (Comments)  Informant's Name: EMR  Who is responsible for making decisions for patient? : Patient    Readmission Evaluation  Is this a readmission?: No    Elopement Risk  Legal Hold: No  Ambulatory or Self Mobile in Wheelchair: No-Not an Elopement Risk  Elopement Risk: Not at Risk for Elopement    Interdisciplinary Discharge Planning  Patient or legal guardian wants to designate a caregiver: No    Discharge Preparedness  What is your plan after discharge?: Uncertain - pending medical team collaboration  What are your discharge supports?: Child (Children and extended family)  Prior Functional Level: Ambulatory, Independent with Activities of Daily Living, Independent with Medication Management    Functional Assesment  Prior Functional Level: Ambulatory, Independent with Activities of Daily Living, Independent with Medication Management    Finances  Financial Barriers to Discharge: No  Prescription Coverage: Yes    Domestic Abuse  Have you ever been the victim of abuse or violence?: No    Psychological Assessment  History of Substance Abuse: None  History of Psychiatric Problems: No  Non-compliant with Treatment: No  Newly Diagnosed Illness: Yes    Discharge Risks or Barriers  Discharge risks or barriers?: Post-acute placement / services, Complex medical needs  Patient risk factors: Complex medical needs, Vulnerable adult    Anticipated Discharge Information  Discharge Disposition: Disch to IP rehab facility or distinct part unit

## 2022-05-27 ENCOUNTER — APPOINTMENT (OUTPATIENT)
Dept: RADIOLOGY | Facility: MEDICAL CENTER | Age: 67
DRG: 870 | End: 2022-05-27
Attending: INTERNAL MEDICINE
Payer: MEDICARE

## 2022-05-27 ENCOUNTER — APPOINTMENT (OUTPATIENT)
Dept: RADIOLOGY | Facility: MEDICAL CENTER | Age: 67
DRG: 870 | End: 2022-05-27
Attending: NURSE PRACTITIONER
Payer: MEDICARE

## 2022-05-27 LAB
ALBUMIN SERPL BCP-MCNC: 2.5 G/DL (ref 3.2–4.9)
ALBUMIN/GLOB SERPL: 0.9 G/DL
ALP SERPL-CCNC: 78 U/L (ref 30–99)
ALT SERPL-CCNC: 32 U/L (ref 2–50)
ANION GAP SERPL CALC-SCNC: 17 MMOL/L (ref 7–16)
ANION GAP SERPL CALC-SCNC: 19 MMOL/L (ref 7–16)
ANION GAP SERPL CALC-SCNC: 19 MMOL/L (ref 7–16)
ANISOCYTOSIS BLD QL SMEAR: ABNORMAL
AST SERPL-CCNC: 39 U/L (ref 12–45)
BACTERIA CSF CULT: NORMAL
BASE EXCESS BLDA CALC-SCNC: -6 MMOL/L (ref -4–3)
BASE EXCESS BLDA CALC-SCNC: 0 MMOL/L (ref -4–3)
BASOPHILS # BLD AUTO: 0 % (ref 0–1.8)
BASOPHILS # BLD: 0 K/UL (ref 0–0.12)
BILIRUB SERPL-MCNC: 0.8 MG/DL (ref 0.1–1.5)
BODY TEMPERATURE: ABNORMAL DEGREES
BODY TEMPERATURE: ABNORMAL DEGREES
BREATHS SETTING VENT: 26
BREATHS SETTING VENT: 26
BUN SERPL-MCNC: 28 MG/DL (ref 8–22)
BUN SERPL-MCNC: 37 MG/DL (ref 8–22)
BUN SERPL-MCNC: 40 MG/DL (ref 8–22)
BURR CELLS BLD QL SMEAR: NORMAL
CA-I BLD ISE-SCNC: 1.03 MMOL/L (ref 1.1–1.3)
CALCIUM SERPL-MCNC: 7.7 MG/DL (ref 8.5–10.5)
CALCIUM SERPL-MCNC: 8 MG/DL (ref 8.5–10.5)
CALCIUM SERPL-MCNC: 8.1 MG/DL (ref 8.5–10.5)
CHLORIDE SERPL-SCNC: 101 MMOL/L (ref 96–112)
CHLORIDE SERPL-SCNC: 103 MMOL/L (ref 96–112)
CHLORIDE SERPL-SCNC: 97 MMOL/L (ref 96–112)
CO2 BLDA-SCNC: 20 MMOL/L (ref 20–33)
CO2 BLDA-SCNC: 31 MMOL/L (ref 20–33)
CO2 SERPL-SCNC: 15 MMOL/L (ref 20–33)
CO2 SERPL-SCNC: 16 MMOL/L (ref 20–33)
CO2 SERPL-SCNC: 19 MMOL/L (ref 20–33)
CREAT SERPL-MCNC: 2.18 MG/DL (ref 0.5–1.4)
CREAT SERPL-MCNC: 2.51 MG/DL (ref 0.5–1.4)
CREAT SERPL-MCNC: 2.99 MG/DL (ref 0.5–1.4)
CRP SERPL HS-MCNC: >70 MG/DL (ref 0–0.75)
DELSYS IDSYS: ABNORMAL
DELSYS IDSYS: ABNORMAL
EKG IMPRESSION: NORMAL
END TIDAL CARBON DIOXIDE IECO2: 29 MMHG
END TIDAL CARBON DIOXIDE IECO2: 49 MMHG
EOSINOPHIL # BLD AUTO: 0 K/UL (ref 0–0.51)
EOSINOPHIL NFR BLD: 0 % (ref 0–6.9)
ERYTHROCYTE [DISTWIDTH] IN BLOOD BY AUTOMATED COUNT: 49.9 FL (ref 35.9–50)
GFR SERPLBLD CREATININE-BSD FMLA CKD-EPI: 22 ML/MIN/1.73 M 2
GFR SERPLBLD CREATININE-BSD FMLA CKD-EPI: 28 ML/MIN/1.73 M 2
GFR SERPLBLD CREATININE-BSD FMLA CKD-EPI: 33 ML/MIN/1.73 M 2
GLOBULIN SER CALC-MCNC: 2.9 G/DL (ref 1.9–3.5)
GLUCOSE BLD STRIP.AUTO-MCNC: 199 MG/DL (ref 65–99)
GLUCOSE BLD STRIP.AUTO-MCNC: 217 MG/DL (ref 65–99)
GLUCOSE BLD STRIP.AUTO-MCNC: 218 MG/DL (ref 65–99)
GLUCOSE SERPL-MCNC: 190 MG/DL (ref 65–99)
GLUCOSE SERPL-MCNC: 217 MG/DL (ref 65–99)
GLUCOSE SERPL-MCNC: 250 MG/DL (ref 65–99)
GRAM STN SPEC: NORMAL
HCO3 BLDA-SCNC: 19.1 MMOL/L (ref 17–25)
HCO3 BLDA-SCNC: 28.9 MMOL/L (ref 17–25)
HCT VFR BLD AUTO: 44.7 % (ref 42–52)
HCT VFR BLD CALC: 42 % (ref 42–52)
HGB BLD-MCNC: 14.3 G/DL (ref 14–18)
HGB BLD-MCNC: 14.3 G/DL (ref 14–18)
HOROWITZ INDEX BLDA+IHG-RTO: 254 MM[HG]
HOROWITZ INDEX BLDA+IHG-RTO: 973 MM[HG]
LACTATE BLD-SCNC: 3.9 MMOL/L (ref 0.5–2)
LYMPHOCYTES # BLD AUTO: 0.24 K/UL (ref 1–4.8)
LYMPHOCYTES NFR BLD: 0.9 % (ref 22–41)
MAGNESIUM SERPL-MCNC: 1.6 MG/DL (ref 1.5–2.5)
MANUAL DIFF BLD: NORMAL
MCH RBC QN AUTO: 28.9 PG (ref 27–33)
MCHC RBC AUTO-ENTMCNC: 32 G/DL (ref 33.7–35.3)
MCV RBC AUTO: 90.3 FL (ref 81.4–97.8)
MODE IMODE: ABNORMAL
MODE IMODE: ABNORMAL
MONOCYTES # BLD AUTO: 1.63 K/UL (ref 0–0.85)
MONOCYTES NFR BLD AUTO: 6.1 % (ref 0–13.4)
MORPHOLOGY BLD-IMP: NORMAL
NEUTROPHILS # BLD AUTO: 24.92 K/UL (ref 1.82–7.42)
NEUTROPHILS NFR BLD: 91.3 % (ref 44–72)
NEUTS BAND NFR BLD MANUAL: 1.7 % (ref 0–10)
NRBC # BLD AUTO: 0.03 K/UL
NRBC BLD-RTO: 0.1 /100 WBC
O2/TOTAL GAS SETTING VFR VENT: 30 %
O2/TOTAL GAS SETTING VFR VENT: 50 %
OVALOCYTES BLD QL SMEAR: NORMAL
PCO2 BLDA: 36.6 MMHG (ref 26–37)
PCO2 BLDA: 63.6 MMHG (ref 26–37)
PCO2 TEMP ADJ BLDA: 36.3 MMHG (ref 26–37)
PCO2 TEMP ADJ BLDA: 63.6 MMHG (ref 26–37)
PEEP END EXPIRATORY PRESSURE IPEEP: 8 CMH20
PEEP END EXPIRATORY PRESSURE IPEEP: 8 CMH20
PH BLDA: 7.26 [PH] (ref 7.4–7.5)
PH BLDA: 7.33 [PH] (ref 7.4–7.5)
PH TEMP ADJ BLDA: 7.26 [PH] (ref 7.4–7.5)
PH TEMP ADJ BLDA: 7.33 [PH] (ref 7.4–7.5)
PHOSPHATE SERPL-MCNC: 4.2 MG/DL (ref 2.5–4.5)
PLATELET # BLD AUTO: 78 K/UL (ref 164–446)
PLATELET BLD QL SMEAR: NORMAL
PMV BLD AUTO: 12.7 FL (ref 9–12.9)
PO2 BLDA: 127 MMHG (ref 64–87)
PO2 BLDA: 292 MMHG (ref 64–87)
PO2 TEMP ADJ BLDA: 126 MMHG (ref 64–87)
PO2 TEMP ADJ BLDA: 292 MMHG (ref 64–87)
POIKILOCYTOSIS BLD QL SMEAR: NORMAL
POTASSIUM BLD-SCNC: 4.8 MMOL/L (ref 3.6–5.5)
POTASSIUM SERPL-SCNC: 4 MMOL/L (ref 3.6–5.5)
POTASSIUM SERPL-SCNC: 4.7 MMOL/L (ref 3.6–5.5)
POTASSIUM SERPL-SCNC: 5.3 MMOL/L (ref 3.6–5.5)
PREALB SERPL-MCNC: 8.6 MG/DL (ref 18–38)
PROT SERPL-MCNC: 5.4 G/DL (ref 6–8.2)
RBC # BLD AUTO: 4.95 M/UL (ref 4.7–6.1)
RBC BLD AUTO: PRESENT
SAO2 % BLDA: 100 % (ref 93–99)
SAO2 % BLDA: 99 % (ref 93–99)
SIGNIFICANT IND 70042: NORMAL
SITE SITE: NORMAL
SODIUM BLD-SCNC: 142 MMOL/L (ref 135–145)
SODIUM SERPL-SCNC: 134 MMOL/L (ref 135–145)
SODIUM SERPL-SCNC: 135 MMOL/L (ref 135–145)
SODIUM SERPL-SCNC: 137 MMOL/L (ref 135–145)
SOURCE SOURCE: NORMAL
SPECIMEN DRAWN FROM PATIENT: ABNORMAL
SPECIMEN DRAWN FROM PATIENT: ABNORMAL
TIDAL VOLUME IVT: 530 ML
TIDAL VOLUME IVT: 530 ML
TRIGL SERPL-MCNC: 136 MG/DL (ref 0–149)
UFH PPP CHRO-ACNC: 0.53 IU/ML
WBC # BLD AUTO: 26.8 K/UL (ref 4.8–10.8)

## 2022-05-27 PROCEDURE — 84478 ASSAY OF TRIGLYCERIDES: CPT

## 2022-05-27 PROCEDURE — 92950 HEART/LUNG RESUSCITATION CPR: CPT | Performed by: INTERNAL MEDICINE

## 2022-05-27 PROCEDURE — 84295 ASSAY OF SERUM SODIUM: CPT

## 2022-05-27 PROCEDURE — 80053 COMPREHEN METABOLIC PANEL: CPT

## 2022-05-27 PROCEDURE — 99292 CRITICAL CARE ADDL 30 MIN: CPT | Mod: 25 | Performed by: INTERNAL MEDICINE

## 2022-05-27 PROCEDURE — 700105 HCHG RX REV CODE 258: Performed by: INTERNAL MEDICINE

## 2022-05-27 PROCEDURE — 85025 COMPLETE CBC W/AUTO DIFF WBC: CPT

## 2022-05-27 PROCEDURE — 770022 HCHG ROOM/CARE - ICU (200)

## 2022-05-27 PROCEDURE — 93005 ELECTROCARDIOGRAM TRACING: CPT | Performed by: INTERNAL MEDICINE

## 2022-05-27 PROCEDURE — 700111 HCHG RX REV CODE 636 W/ 250 OVERRIDE (IP): Performed by: INTERNAL MEDICINE

## 2022-05-27 PROCEDURE — 90945 DIALYSIS ONE EVALUATION: CPT

## 2022-05-27 PROCEDURE — 93926 LOWER EXTREMITY STUDY: CPT | Mod: RT

## 2022-05-27 PROCEDURE — 82330 ASSAY OF CALCIUM: CPT

## 2022-05-27 PROCEDURE — 74176 CT ABD & PELVIS W/O CONTRAST: CPT | Mod: MG

## 2022-05-27 PROCEDURE — 80048 BASIC METABOLIC PNL TOTAL CA: CPT

## 2022-05-27 PROCEDURE — 82962 GLUCOSE BLOOD TEST: CPT

## 2022-05-27 PROCEDURE — 700111 HCHG RX REV CODE 636 W/ 250 OVERRIDE (IP)

## 2022-05-27 PROCEDURE — 84134 ASSAY OF PREALBUMIN: CPT

## 2022-05-27 PROCEDURE — 37799 UNLISTED PX VASCULAR SURGERY: CPT

## 2022-05-27 PROCEDURE — 86140 C-REACTIVE PROTEIN: CPT

## 2022-05-27 PROCEDURE — 85007 BL SMEAR W/DIFF WBC COUNT: CPT

## 2022-05-27 PROCEDURE — 85014 HEMATOCRIT: CPT

## 2022-05-27 PROCEDURE — 700102 HCHG RX REV CODE 250 W/ 637 OVERRIDE(OP): Performed by: NURSE PRACTITIONER

## 2022-05-27 PROCEDURE — 73700 CT LOWER EXTREMITY W/O DYE: CPT | Mod: RT,ME

## 2022-05-27 PROCEDURE — 93922 UPR/L XTREMITY ART 2 LEVELS: CPT

## 2022-05-27 PROCEDURE — A9270 NON-COVERED ITEM OR SERVICE: HCPCS | Performed by: NURSE PRACTITIONER

## 2022-05-27 PROCEDURE — 82803 BLOOD GASES ANY COMBINATION: CPT | Mod: 91

## 2022-05-27 PROCEDURE — 71045 X-RAY EXAM CHEST 1 VIEW: CPT

## 2022-05-27 PROCEDURE — 84132 ASSAY OF SERUM POTASSIUM: CPT

## 2022-05-27 PROCEDURE — 83605 ASSAY OF LACTIC ACID: CPT

## 2022-05-27 PROCEDURE — 94003 VENT MGMT INPAT SUBQ DAY: CPT

## 2022-05-27 PROCEDURE — 83735 ASSAY OF MAGNESIUM: CPT

## 2022-05-27 PROCEDURE — 700101 HCHG RX REV CODE 250: Performed by: INTERNAL MEDICINE

## 2022-05-27 PROCEDURE — 700102 HCHG RX REV CODE 250 W/ 637 OVERRIDE(OP): Performed by: INTERNAL MEDICINE

## 2022-05-27 PROCEDURE — 99291 CRITICAL CARE FIRST HOUR: CPT | Mod: 25 | Performed by: NURSE PRACTITIONER

## 2022-05-27 PROCEDURE — 99233 SBSQ HOSP IP/OBS HIGH 50: CPT | Performed by: INTERNAL MEDICINE

## 2022-05-27 PROCEDURE — 94799 UNLISTED PULMONARY SVC/PX: CPT

## 2022-05-27 PROCEDURE — 85520 HEPARIN ASSAY: CPT

## 2022-05-27 PROCEDURE — A9270 NON-COVERED ITEM OR SERVICE: HCPCS | Performed by: INTERNAL MEDICINE

## 2022-05-27 PROCEDURE — 5A12012 PERFORMANCE OF CARDIAC OUTPUT, SINGLE, MANUAL: ICD-10-PCS | Performed by: INTERNAL MEDICINE

## 2022-05-27 PROCEDURE — 90935 HEMODIALYSIS ONE EVALUATION: CPT

## 2022-05-27 PROCEDURE — 84100 ASSAY OF PHOSPHORUS: CPT

## 2022-05-27 PROCEDURE — 93010 ELECTROCARDIOGRAM REPORT: CPT | Performed by: INTERNAL MEDICINE

## 2022-05-27 RX ORDER — HEPARIN SODIUM 1000 [USP'U]/ML
2800 INJECTION, SOLUTION INTRAVENOUS; SUBCUTANEOUS
Status: DISCONTINUED | OUTPATIENT
Start: 2022-05-27 | End: 2022-06-22 | Stop reason: HOSPADM

## 2022-05-27 RX ORDER — ACETAMINOPHEN 325 MG/1
650 TABLET ORAL EVERY 6 HOURS PRN
Status: DISCONTINUED | OUTPATIENT
Start: 2022-05-27 | End: 2022-06-04

## 2022-05-27 RX ORDER — SODIUM CHLORIDE 9 MG/ML
1000 INJECTION, SOLUTION INTRAVENOUS ONCE
Status: COMPLETED | OUTPATIENT
Start: 2022-05-27 | End: 2022-05-27

## 2022-05-27 RX ORDER — HEPARIN SODIUM 1000 [USP'U]/ML
INJECTION, SOLUTION INTRAVENOUS; SUBCUTANEOUS
Status: COMPLETED
Start: 2022-05-27 | End: 2022-05-27

## 2022-05-27 RX ORDER — EPINEPHRINE 0.1 MG/ML
SYRINGE (ML) INJECTION
Status: COMPLETED | OUTPATIENT
Start: 2022-05-27 | End: 2022-05-27

## 2022-05-27 RX ADMIN — HYDROMORPHONE HYDROCHLORIDE 0.5 MG: 1 INJECTION, SOLUTION INTRAMUSCULAR; INTRAVENOUS; SUBCUTANEOUS at 10:15

## 2022-05-27 RX ADMIN — PIPERACILLIN AND TAZOBACTAM 3.38 G: 3; .375 INJECTION, POWDER, LYOPHILIZED, FOR SOLUTION INTRAVENOUS; PARENTERAL at 14:02

## 2022-05-27 RX ADMIN — PROPOFOL 25 MCG/KG/MIN: 10 INJECTION, EMULSION INTRAVENOUS at 13:47

## 2022-05-27 RX ADMIN — HYDROCORTISONE SODIUM SUCCINATE 50 MG: 100 INJECTION, POWDER, FOR SOLUTION INTRAMUSCULAR; INTRAVENOUS at 05:29

## 2022-05-27 RX ADMIN — EPINEPHRINE 1 MG: 0.1 INJECTION, SOLUTION INTRAVENOUS at 12:02

## 2022-05-27 RX ADMIN — HYDROMORPHONE HYDROCHLORIDE 0.5 MG: 1 INJECTION, SOLUTION INTRAMUSCULAR; INTRAVENOUS; SUBCUTANEOUS at 01:49

## 2022-05-27 RX ADMIN — NOREPINEPHRINE BITARTRATE 80 MCG/MIN: 1 INJECTION, SOLUTION, CONCENTRATE INTRAVENOUS at 17:19

## 2022-05-27 RX ADMIN — HYDROMORPHONE HYDROCHLORIDE 0.5 MG: 1 INJECTION, SOLUTION INTRAMUSCULAR; INTRAVENOUS; SUBCUTANEOUS at 04:36

## 2022-05-27 RX ADMIN — NOREPINEPHRINE BITARTRATE 80 MCG/MIN: 1 INJECTION, SOLUTION, CONCENTRATE INTRAVENOUS at 19:01

## 2022-05-27 RX ADMIN — HEPARIN SODIUM 2800 UNITS: 1000 INJECTION, SOLUTION INTRAVENOUS; SUBCUTANEOUS at 13:11

## 2022-05-27 RX ADMIN — DEXMEDETOMIDINE 1.2 MCG/KG/HR: 200 INJECTION, SOLUTION INTRAVENOUS at 00:11

## 2022-05-27 RX ADMIN — FUROSEMIDE 100 MG: 10 INJECTION, SOLUTION INTRAMUSCULAR; INTRAVENOUS at 06:03

## 2022-05-27 RX ADMIN — SODIUM CHLORIDE 1000 ML: 9 INJECTION, SOLUTION INTRAVENOUS at 12:10

## 2022-05-27 RX ADMIN — DEXMEDETOMIDINE 1.1 MCG/KG/HR: 200 INJECTION, SOLUTION INTRAVENOUS at 09:03

## 2022-05-27 RX ADMIN — HYDROCORTISONE SODIUM SUCCINATE 50 MG: 100 INJECTION, POWDER, FOR SOLUTION INTRAMUSCULAR; INTRAVENOUS at 11:32

## 2022-05-27 RX ADMIN — MINERAL OIL, PETROLATUM 1 APPLICATION: 425; 573 OINTMENT OPHTHALMIC at 21:59

## 2022-05-27 RX ADMIN — NOREPINEPHRINE BITARTRATE 56 MCG/MIN: 1 INJECTION, SOLUTION, CONCENTRATE INTRAVENOUS at 23:46

## 2022-05-27 RX ADMIN — SODIUM BICARBONATE 100 MEQ: 84 INJECTION, SOLUTION INTRAVENOUS at 12:00

## 2022-05-27 RX ADMIN — ACETAMINOPHEN 650 MG: 325 TABLET ORAL at 23:50

## 2022-05-27 RX ADMIN — DAKIN'S SOLUTION 0.125% (QUARTER STRENGTH) 2 ML: 0.12 SOLUTION at 22:00

## 2022-05-27 RX ADMIN — MINERAL OIL, PETROLATUM 1 APPLICATION: 425; 573 OINTMENT OPHTHALMIC at 11:33

## 2022-05-27 RX ADMIN — HYDROCORTISONE SODIUM SUCCINATE 50 MG: 100 INJECTION, POWDER, FOR SOLUTION INTRAMUSCULAR; INTRAVENOUS at 23:47

## 2022-05-27 RX ADMIN — NOREPINEPHRINE BITARTRATE 50 MCG/MIN: 1 INJECTION, SOLUTION, CONCENTRATE INTRAVENOUS at 15:02

## 2022-05-27 RX ADMIN — SENNOSIDES AND DOCUSATE SODIUM 2 TABLET: 50; 8.6 TABLET ORAL at 05:29

## 2022-05-27 RX ADMIN — NOREPINEPHRINE BITARTRATE 52 MCG/MIN: 1 INJECTION, SOLUTION, CONCENTRATE INTRAVENOUS at 21:37

## 2022-05-27 RX ADMIN — NOREPINEPHRINE BITARTRATE 51 MCG/MIN: 1 INJECTION, SOLUTION, CONCENTRATE INTRAVENOUS at 03:40

## 2022-05-27 RX ADMIN — DEXMEDETOMIDINE 1.1 MCG/KG/HR: 200 INJECTION, SOLUTION INTRAVENOUS at 03:04

## 2022-05-27 RX ADMIN — SENNOSIDES AND DOCUSATE SODIUM 2 TABLET: 50; 8.6 TABLET ORAL at 17:36

## 2022-05-27 RX ADMIN — DEXMEDETOMIDINE 1.1 MCG/KG/HR: 200 INJECTION, SOLUTION INTRAVENOUS at 06:06

## 2022-05-27 RX ADMIN — FAMOTIDINE 20 MG: 20 TABLET, FILM COATED ORAL at 05:29

## 2022-05-27 RX ADMIN — HEPARIN SODIUM 2800 UNITS: 1000 INJECTION, SOLUTION INTRAVENOUS; SUBCUTANEOUS at 19:10

## 2022-05-27 RX ADMIN — HYDROCORTISONE SODIUM SUCCINATE 50 MG: 100 INJECTION, POWDER, FOR SOLUTION INTRAMUSCULAR; INTRAVENOUS at 17:36

## 2022-05-27 RX ADMIN — NOREPINEPHRINE BITARTRATE 56 MCG/MIN: 1 INJECTION, SOLUTION, CONCENTRATE INTRAVENOUS at 11:32

## 2022-05-27 RX ADMIN — PROPOFOL 20 MCG/KG/MIN: 10 INJECTION, EMULSION INTRAVENOUS at 22:25

## 2022-05-27 RX ADMIN — PIPERACILLIN AND TAZOBACTAM 3.38 G: 3; .375 INJECTION, POWDER, LYOPHILIZED, FOR SOLUTION INTRAVENOUS; PARENTERAL at 05:29

## 2022-05-27 RX ADMIN — PIPERACILLIN AND TAZOBACTAM 3.38 G: 3; .375 INJECTION, POWDER, LYOPHILIZED, FOR SOLUTION INTRAVENOUS; PARENTERAL at 21:59

## 2022-05-27 RX ADMIN — HEPARIN SODIUM 18 UNITS/KG/HR: 5000 INJECTION, SOLUTION INTRAVENOUS at 12:36

## 2022-05-27 RX ADMIN — FENTANYL CITRATE 100 MCG: 50 INJECTION, SOLUTION INTRAMUSCULAR; INTRAVENOUS at 12:30

## 2022-05-27 RX ADMIN — DAKIN'S SOLUTION 0.125% (QUARTER STRENGTH) 2 ML: 0.12 SOLUTION at 02:21

## 2022-05-27 RX ADMIN — PROPOFOL 30 MCG/KG/MIN: 10 INJECTION, EMULSION INTRAVENOUS at 17:01

## 2022-05-27 RX ADMIN — NOREPINEPHRINE BITARTRATE 58 MCG/MIN: 1 INJECTION, SOLUTION, CONCENTRATE INTRAVENOUS at 09:04

## 2022-05-27 RX ADMIN — HEPARIN SODIUM: 1000 INJECTION, SOLUTION INTRAVENOUS; SUBCUTANEOUS at 13:11

## 2022-05-27 ASSESSMENT — PAIN DESCRIPTION - PAIN TYPE
TYPE: ACUTE PAIN

## 2022-05-27 ASSESSMENT — LIFESTYLE VARIABLES: REASON UNABLE TO ASSESS: INTUBATED AND SEDATED

## 2022-05-27 ASSESSMENT — FIBROSIS 4 INDEX: FIB4 SCORE: 4.26

## 2022-05-27 NOTE — PROCEDURES
CPR NOTE:    Patient was rapidly started on ACLS protocol, with CPR and limited interruption while maintaining good depth, recoil, and limit respiration to 12/minute to not prevent CPP and when available EtCO2 was closely monitored for ROSC to limit pulse checks and to continue to monitor for quality of CPR. Pads were placed and constantly monitor rhythm and when available bedside ultrasound is always utilized to evaluate cardiac activity, rule out alternate causes such as hypovolemia, tamponade, ptx, pulmonary emboli/right heart strain, true vs pseudo PEA and cardiac contractility.     Please review nursing code note for specific times of drugs and therapies administered.     Patient developed PEA sudden with dropping of arterial line tracing, CPR was started given epinephrine push, bicarb x2, 1L NS and ROSC was achieved. He was given fentanyl and started on propofol for hypertension post code. Stat CXR with no ptx or acute findings, ekg reviewed, bedside POCUS with no effusion with similar function. Updated loves at bedside.     Reuben Swartz MD  Critical Care Medicine

## 2022-05-27 NOTE — PROGRESS NOTES
Discussed new lactate of 4.9 with bedside RN.  Last lactate was yesterday afternoon.  Currently all signs point to slow improvement on CVVH.  Vasopressors are being titrated down, current MAP is ~95.  Given this, will recheck lactate in a few hours.  If stable or improving then continue current course.      If lactate is starting to rise, would consider inotrope for EF 35%.    Taras Duran M.D.

## 2022-05-27 NOTE — PROGRESS NOTES
Infectious Disease Progress Note    Author: Vivian Francois M.D. Date & Time of service: 2022  8:37 AM    Chief Complaint:  Follow-up for septic shock, E. coli bacteremia, right lower extremity cellulitis    Interval History:   afebrile, WBC 24.5, blood cultures now growing E. Coli, meningitis CSF PCR panel not detected.  Patient remains on pressors.  Patient was noted to be obtunded with respiratory distress early this morning and has not now been intubated.  Renal function worse today.  Daughter and girlfriend at bedside with questions   T-max 99.7 WBC 26.8, remains on multiple pressors with increasing requirements overnight, remains on the vent however oxygenation requirements improving, renal function improving on CRRT    Labs Reviewed, Medications Reviewed and Wound Reviewed.    Review of Systems:  Review of Systems   Unable to perform ROS: Intubated       Hemodynamics:  No data recorded.  Monitored Temp: 36.8 °C (98.24 °F)  Pulse  Av.7  Min: 55  Max: 159   Blood Pressure : 126/61, Arterial BP: 113/67       Physical Exam:  Physical Exam  Vitals and nursing note reviewed.   Constitutional:       Appearance: He is ill-appearing.   HENT:      Mouth/Throat:      Mouth: Mucous membranes are moist.      Comments: ET tube  Eyes:      Comments: Eyes closed   Neck:      Comments: Right IJ catheter    Left IJ HD catheter  Cardiovascular:      Rate and Rhythm: Tachycardia present.   Abdominal:      Palpations: Abdomen is soft.   Musculoskeletal:      Right lower leg: Edema present.      Comments: Right knee surgical scar    Open wound on the anterior aspect of the right lower extremity dressed      Bilateral feet cool to the touch with mottling, right greater than left    Blister formation on right foot and leg   Skin:     General: Skin is warm and dry.   Neurological:      Comments: Intubated and sedated   Psychiatric:      Comments: Unable to assess as patient intubated and sedated          Meds:    Current Facility-Administered Medications:   •  artificial tears  •  heparin  •  EPINEPHrine (Adrenalin) infusion  •  piperacillin-tazobactam  •  dexmedetomidine (Precedex) infusion  •  heparin  •  heparin  •  insulin regular **AND** POC blood glucose manual result **AND** NOTIFY MD and PharmD **AND** Administer 20 grams of glucose (approximately 8 ounces of fruit juice) every 15 minutes PRN FSBG less than 70 mg/dL **AND** dextrose bolus  •  Pharmacy  •  Respiratory Therapy Consult  •  famotidine **OR** famotidine  •  senna-docusate **AND** polyethylene glycol/lytes **AND** magnesium hydroxide **AND** bisacodyl  •  lidocaine  •  vasopressin (PITRESSIN) infusion  •  phenylephrine infusion  •  furosemide  •  sodium chloride  •  dakins 0.125% (1/4 strength)  •  norepinephrine (Levophed) infusion  •  hydrocortisone sodium succinate PF  •  HYDROmorphone    Labs:  Recent Labs     05/26/22  0359 05/26/22  0917 05/27/22  0516   WBC 24.5* 30.4* 26.8*   RBC 4.90 4.85 4.95   HEMOGLOBIN 14.4 14.5 14.3   HEMATOCRIT 47.1 45.8 44.7   MCV 96.1 94.4 90.3   MCH 29.4 29.9 28.9   RDW 52.5* 51.3* 49.9   PLATELETCT 76* 89* 78*   MPV 11.9 11.7 12.7   NEUTSPOLYS 79.80* 78.30* 91.30*   LYMPHOCYTES 1.70* 0.90* 0.90*   MONOCYTES 8.80 6.10 6.10   EOSINOPHILS 0.00 0.00 0.00   BASOPHILS 0.00 0.00 0.00   RBCMORPHOLO Present Present Present     Recent Labs     05/24/22  1525 05/24/22  1938 05/26/22  1655 05/26/22 2040 05/27/22  0516   SODIUM  --    < > 142 140 137   POTASSIUM  --    < > 5.6* 5.3 5.3   CHLORIDE  --    < > 105 105 103   CO2  --    < > 12* 13* 15*   GLUCOSE  --    < > 160* 160* 217*   BUN  --    < > 61* 48* 40*   CPKTOTAL 684*  --   --   --   --     < > = values in this interval not displayed.     Recent Labs     05/26/22  0359 05/26/22  0917 05/26/22  1655 05/26/22 2040 05/27/22  0516   ALBUMIN 3.0*  --   --  2.6* 2.5*   TBILIRUBIN 0.7  --   --  0.8 0.8   ALKPHOSPHAT 51  --   --  67 78   TOTPROTEIN 5.4*  --   --   5.4* 5.4*   ALTSGPT 38  --   --  34 32   ASTSGOT 42  --   --  34 39   CREATININE 5.22*   < > 5.41* 4.29* 2.99*    < > = values in this interval not displayed.       Imaging:  CT-CSPINE WITHOUT PLUS RECONS    Result Date: 5/24/2022 5/24/2022 11:38 AM HISTORY/REASON FOR EXAM: Fall and neck pain TECHNIQUE/EXAM DESCRIPTION: CT cervical spine without contrast, with reconstructions. The study was performed on a helical multidetector CT scanner. Thin-section helical scanning was performed from the skull base through T1. Sagittal and coronal multiplanar reconstructions were generated from the axial images. Low dose optimization technique was utilized for this CT exam including automated exposure control and adjustment of the mA and/or kV according to patient size. COMPARISON:  None. FINDINGS: Alignment in the cervical spine is normal. There is no fracture or dislocation. The craniovertebral junction appears intact. The prevertebral and paraspinous soft tissues are unremarkable. There is moderate disc space. C6-7 level with marginal osteophytosis and moderate posterior spurring. The superior mediastinum and lung apices in the field of view are unremarkable.     1.  Moderate osteoarthritic changes at the C6-7 level with disc space narrowing and marginal osteophytosis. Further there is moderate cervical spondylotic changes at this level. 2.  No evidence of cervical spine fracture and/or subluxation.    CT-ABDOMEN-PELVIS WITH    Result Date: 5/24/2022 5/24/2022 11:39 AM HISTORY/REASON FOR EXAM:  trauma red. TECHNIQUE/EXAM DESCRIPTION:   CT scan of the abdomen and pelvis with contrast. Contrast-enhanced helical scanning was obtained from the diaphragmatic domes through the pubic symphysis following the bolus administration of nonionic contrast without complication. 75 mL of Omnipaque 350 nonionic contrast was administered without complication. Low dose optimization technique was utilized for this CT exam including automated  exposure control and adjustment of the mA and/or kV according to patient size. COMPARISON: Complete abdominal sonogram, 9/14/2021. FINDINGS: Lower Chest: Dependent atelectasis in the lung bases. No pleural effusion or pneumothorax. No pericardial effusion. Liver: Normal. Spleen: Unremarkable. Pancreas: Unremarkable. Gallbladder: No calcified stones. Biliary: Nondilated. Adrenal glands: Normal. Kidneys: Absent right kidney. Right lower quadrant transplant kidney without hydronephrosis. Enlarged left kidney with 2 numerous to count fluid attenuation lesions throughout the cortex. Nonobstructing stones in the lower pole of the left kidney. No hydronephrosis or hydroureter. Bowel: Sigmoid colon diverticulosis, without diverticulitis. Normal appendix. Lymph nodes: No adenopathy. Vasculature: Calcified atherosclerotic plaques in the aorta and iliac arteries, without aneurysmal dilation. Peritoneum: Unremarkable without ascites. Musculoskeletal: No acute or destructive process. Multilevel lumbar spondylosis. Pelvis: The bladder is decompressed by an indwelling Epps catheter.. Small umbilical hernia containing fat.     1. No acute posttraumatic findings in the abdomen or pelvis. 2. Bibasilar subsegmental atelectasis. 3. Right pelvic transplant kidney without hydronephrosis. 4. Polycystic left kidney. 5. Diverticulosis without diverticulitis. Normal appendix.    CT-EXTREMITY, LOWER W/O RIGHT    Result Date: 5/24/2022 5/24/2022 4:17 PM HISTORY/REASON FOR EXAM:  Soft tissue infection suspected, lower leg, no prior imaging; necrotizing fasciitis to right lower ext, recieved large amounts of contrast already with renal transplant. TECHNIQUE/EXAM DESCRIPTION AND NUMBER OF VIEWS: CT scan of the RIGHT lower extremity without contrast and including reconstructions. Thin-section noncontrast helical images were obtained. Coronal and sagittal reconstructions were generated from the axial images. Up to date radiation dose reduction  adjustments have been utilized to meet ALARA standards for radiation dose reduction. COMPARISON: None. FINDINGS: Right total knee replacement. There is decreased bony mineralization of the right lower extremity. There is no evidence of ulceration or soft tissue mass in the right lower extremity. There is no evidence of large abscess formation. There is diffuse atherosclerotic calcification of the right superficial femoral artery. There are curvilinear regions of soft tissue attenuation throughout the subcutaneous tissues of the right mid and distal lower leg.     1.  Status post right total knee replacement. 2.  Diffuse atherosclerotic calcification of the arterial system of the right lower extremity suspicious for diabetes mellitus. 3.  Soft tissue attenuation in the subcutaneous tissues of the mid to distal lower leg and foot suspicious for cellulitis. 4.  No evidence of soft tissue ulceration in the right lower leg or foot. 5.  No evidence of acute osteomyelitis in the right lower leg or foot. 6.  Small subcutaneous abscesses cannot be excluded without the use of intravenous contrast.    CT-HEAD W/O    Result Date: 5/24/2022 5/24/2022 11:38 AM HISTORY/REASON FOR EXAM:  trauma red. TECHNIQUE/EXAM DESCRIPTION AND NUMBER OF VIEWS: CT of the head without contrast. The study was performed on a helical multidetector CT scanner. Contiguous axial sections were obtained from the skull base through the vertex. Up to date radiation dose reduction adjustments have been utilized to meet ALARA standards for radiation dose reduction. COMPARISON:  None available FINDINGS: The calvariae and skull base are unremarkable. There are no extraaxial fluid collections. There is a pattern of cerebral atrophy manifest as enlargement of sulcal markings and ventricular prominence. The ventricular system and basal cisterns are otherwise unremarkable. There are no areas of abnormal density in the brain substance. There are no hemorrhagic  lesions. There are no mass effects or shift of midline structures. The brainstem and posterior fossa structures are unremarkable. Paranasal sinuses in the field of view are unremarkable. Mastoids in the field of view are unremarkable.     1.  Cerebral atrophy. 2.  Otherwise, Head CT without contrast within normal limits. No evidence of acute cerebral infarction, hemorrhage or mass lesion.     DX-CHEST-LIMITED (1 VIEW)    Result Date: 5/25/2022 5/25/2022 7:52 AM HISTORY/REASON FOR EXAM:  Central line placement TECHNIQUE/EXAM DESCRIPTION AND NUMBER OF VIEWS: Single portable view of the chest. COMPARISON: Chest radiography, 5/25/2022 at 0719 hours FINDINGS: Lungs: Symmetric low lung volumes. Stable linear and patchy opacities are unchanged. Stable small left pleural effusion. The right costophrenic recess is sharp. No visible pneumothorax bilaterally. Mediastinum: Cardiac silhouette size remains enlarged. Other: The right internal jugular central venous access catheter placed in the interval terminates over the right atrium. The remaining visualized bones and soft tissues are stable radiographically.     1. Right internal jugular central venous access catheter placed in the interval terminates over the upper aspect of the right atrium. No postprocedure visible pneumothorax. 2. Stable patchy parenchymal opacities in the lungs, with a stable small left pleural effusion.    DX-CHEST-LIMITED (1 VIEW)    Result Date: 5/24/2022 5/24/2022 11:26 AM HISTORY/REASON FOR EXAM:  Chest Pain. TECHNIQUE/EXAM DESCRIPTION AND NUMBER OF VIEWS: Single AP view of the chest. COMPARISON:  Chest x-ray 11/13/2020 FINDINGS: Lungs:  There are curvilinear opacities in the lung fields bilaterally most pronounced in the perihilar regions. Pleura:  There is no pleural effusion or pneumothorax. Heart and mediastinum:  The heart silhouette is mildly enlarged Bones:  Normal     1.  Curvilinear perihilar opacities likely atelectasis and/or parenchymal  scarring. 2.  Mild cardiomegaly.    DX-CHEST-PORTABLE (1 VIEW)    Result Date: 5/25/2022 5/25/2022 7:12 AM HISTORY/REASON FOR EXAM: Shortness of Breath TECHNIQUE/EXAM DESCRIPTION:  Single AP view of the chest. COMPARISON: Yesterday FINDINGS: Cardiomegaly is observed. The mediastinal contour appears within normal limits.  The central  pulmonary vasculature appears prominent and indistinct. Bilateral lung volumes are diminished.  Diffuse scattered hazy pulmonary parenchymal opacities are seen. Blunting of bilateral costophrenic angles is seen, compatible with trace bilateral pleural effusions. The bony structures appear age-appropriate.     1.  Pulmonary edema and/or infiltrates are identified, which are stable since the prior exam. 2.  Trace bilateral pleural effusions 3.  Cardiomegaly    DX-CHEST-PORTABLE (1 VIEW)    Result Date: 5/24/2022 5/24/2022 1:00 PM HISTORY/REASON FOR EXAM:  Central line placement. TECHNIQUE/EXAM DESCRIPTION AND NUMBER OF VIEWS: Single portable view of the chest. COMPARISON: Chest radiograph, 5/24/2022 at 1147 hours FINDINGS: Lungs: Stable curvilinear opacities in the lung fields, greatest in the perihilar regions, stable when compared to prior study. Stable bibasal parenchymal volume loss. No large volume pleural effusion or visible pneumothorax. No pulmonary vascular congestion or interstitial edema. Mediastinum: The cardiac silhouette size remains enlarged. Other: The left internal jugular central venous access catheter placed in the interval terminates over a persistent left superior vena cava. The remaining visualized bones and soft tissues are stable radiographically.     Left internal jugular central venous access catheter terminates over a persistent left superior vena cava. No postprocedure visible pneumothorax. The remainder is stable.    DX-PELVIS-1 OR 2 VIEWS    Result Date: 5/24/2022 5/24/2022 11:27 AM HISTORY/REASON FOR EXAM:  Pelvic/Hip Pain Following Trauma.  TECHNIQUE/EXAM DESCRIPTION AND NUMBER OF VIEWS:  1 view(s) of the pelvis. COMPARISON:  None. FINDINGS: There is normal bony mineralization of the pelvis. There is no evidence of pelvic fracture or osseous lesion. The sacroiliac joints and pubic symphysis are symmetrical.     1.  Unremarkable single AP view of the pelvis.    DX-TIBIA AND FIBULA RIGHT    Result Date: 5/24/2022 5/24/2022 12:17 PM HISTORY/REASON FOR EXAM:  Pain/Deformity Following Trauma. TECHNIQUE/EXAM DESCRIPTION AND NUMBER OF VIEWS:  2 views of the RIGHT tibia and fibula. COMPARISON: Right knee radiography, 12/18/2006 FINDINGS: Bones: Postsurgical changes in the distal right femur and proximal right tibia. Normal bone mineralization. No focal bone lesion. No acute fracture. Joint: Postsurgical changes of right total knee arthroplasty. Ankle mortise is preserved. No subluxation. Soft tissue: Arteriosclerosis. Circumferential right lower leg soft tissue swelling.     1. Right lower leg soft tissue swelling. 2. No acute fracture or subluxation. 3. Postsurgical changes of right total knee arthroplasty.    CT-CTA CHEST PULMONARY ARTERY W/ RECONS    Result Date: 5/24/2022 5/24/2022 11:38 AM HISTORY/REASON FOR EXAM:  Fall and chest pain TECHNIQUE/EXAM DESCRIPTION: CT angiogram scan for pulmonary embolism with contrast, with reconstructions. 1.25 mm helical sections were obtained from the lung apices through the lung bases following the rapid bolus administration of 75 mL of Omnipaque 350 nonionic contrast. Thin-section overlapping reconstruction interval was utilized. Coronal reconstructions were generated from the axial data. MIP post processing was performed and utilized for the interpretation. Low dose optimization technique was utilized for this CT exam including automated exposure control and adjustment of the mA and/or kV according to patient size. COMPARISON: None FINDINGS: Pulmonary Embolism: No. Main Pulmonary Arteries: No. Segmental branches:  No. Subsegmental branches: No. Additional Comments: None. Lungs: There are curvilinear opacities dependently in the lung bases. Pleura: No pleural effusion. Nodes: No enlarged lymph nodes. Additional findings: Diffuse coronary artery calcification.     1.  No CT evidence of pulmonary emboli. 2.  Bibasilar atelectasis. 3.  No evidence of rib fracture. 4. Diffuse coronary artery calcification.    EC-ECHOCARDIOGRAM COMPLETE W/ CONT    Result Date: 2022  Transthoracic Echo Report Echocardiography Laboratory CONCLUSIONS No prior study is available for comparison. The left ventricular ejection fraction is visually estimated to be 35%. Unable to estimate pulmonary artery pressure due to an inadequate tricuspid regurgitant jet. CIARA FORRESTER Exam Date:         2022                    11:35 Exam Location:     Inpatient Priority:          Routine Ordering Physician:        ALEJANDRA FROST Referring Physician: Sonographer:               Jack Lockwood RDCS, RVT Age:    66     Gender:    M MRN:    [id number] :    1955 BSA:    2.4    Ht (in):    77     Wt (lb):    235 Exam Type:     Complete Indications:     Shortness of breath ICD Codes:       786.05 CPT Codes:       64434 BP:   90     /   51     HR:   97 Technical Quality:       Technically difficult study -                          adequate information is obtained MEASUREMENTS  (Male / Female) Normal Values 2D ECHO LV Diastolic Diameter PLAX        4.6 cm                4.2 - 5.9 / 3.9 - 5.3 cm LV Systolic Diameter PLAX         4.1 cm                2.1 - 4.0 cm IVS Diastolic Thickness           1.2 cm                LVPW Diastolic Thickness          1.2 cm                LVOT Diameter                     2 cm                  Estimated LV Ejection Fraction    35 %                  LV Ejection Fraction MOD BP       38.5 %                >= 55  % LV Ejection Fraction MOD 4C       52.3 %                LV Ejection Fraction MOD 2C        40.7 %                IVC Diameter                      1.8 cm                DOPPLER AV Peak Velocity                  1.5 m/s               AV Peak Gradient                  9.5 mmHg              AV Mean Gradient                  5.7 mmHg              LVOT Peak Velocity                0.8 m/s               AV Area Cont Eq vti               1.6 cm2               MV Velocity Time Integral         13.5 cm               Mitral E Point Velocity           0.63 m/s              Mitral E to A Ratio               1.1                   MV Pressure Half Time             62.8 ms               MV Area PHT                       3.5 cm2               MV Deceleration Time              217 ms                TR Peak Velocity                  275 cm/s              PV Peak Velocity                  1.1 m/s               PV Peak Gradient                  4.6 mmHg              RVOT Peak Velocity                0.79 m/s              * Indicates values subject to auto-interpretation LV EF:  35    % FINDINGS Left Ventricle 3 ml of  contrast was used to evaluate for thrombus in the left ventricular apex. Normal left ventricular chamber size. Mild concentric left ventricular hypertrophy. Moderately reduced left ventricular systolic function. The left ventricular ejection fraction is visually estimated to be 35%.There is  regional wall motion abnormalities may be from old MI.indeterminate diastolic function. Right Ventricle Normal right ventricular size. Reduced right ventricular systolic function. Right Atrium Enlarged right atrium. Normal inferior vena cava size and inspiratory collapse. Left Atrium Normal left atrial size. Left atrial volume index is 21  mL/sq m. Mitral Valve The mitral valve is not well visualized. No mitral stenosis. Trace mitral regurgitation. Aortic Valve The aortic valve is not well visualized. No aortic valve stenosis. No aortic insufficiency. Tricuspid Valve The tricuspid valve is not well visualized. No  tricuspid stenosis. Trace tricuspid regurgitation. Unable to estimate pulmonary artery pressure due to an inadequate tricuspid regurgitant jet. Pulmonic Valve The pulmonic valve is not well visualized. No pulmonic stenosis. Trace pulmonic insufficiency. Pericardium Normal pericardium without effusion. Aorta The ascending aorta diameter is 3.2  cm. Matthew Yoder MD (Electronically Signed) Final Date:     25 May 2022 14:37    [facility] (FOR ED USE ONLY)    Result Date: 5/24/2022 5/24/2022 12:01 PM HISTORY/REASON FOR EXAM:  Ground-level fall with traumatic injury TECHNIQUE/EXAM DESCRIPTION AND NUMBER OF VIEWS:  Limited ultrasound of the abdomen. FAST scan. Focused ultrasound of the 4 quadrants of the abdomen. COMPARISON: None FINDINGS: Focused ultrasound of the 4 quadrants of the abdomen demonstrates no evidence of free fluid in the 4 quadrants.     No free fluid seen in all 4 quadrants. Negative FAST scan.       Micro:  Results     Procedure Component Value Units Date/Time    GRAM STAIN [[id number]] Collected: 05/26/22 0944    Order Status: Completed Specimen: Respirate Updated: 05/26/22 1847     Significant Indicator .     Source RESP     Site BRONCHOALVEOLAR LAVAGE     Gram Stain Result Moderate WBCs.  Few Gram positive cocci.  <5% intracellular organisms.      Narrative:      Collected By: [id number] SANTOSH BUENO.  Collected By: [id number] SANTOSH BUENO.    QUANT BRONCHIAL WASHING [[id number]] Collected: 05/26/22 0944    Order Status: No result Specimen: Respirate Updated: 05/26/22 1846     Significant Indicator NEG     Source RESP     Site BRONCHOALVEOLAR LAVAGE     Culture Result -     Gram Stain Result Moderate WBCs.  Few Gram positive cocci.  <5% intracellular organisms.      Narrative:      Collected By: [id number] SANTOSH BUENO.  Collected By: [id number] SANTOSH BUENO.    CSF CULTURE [[id number]] Collected: 05/24/22 1325    Order Status: Completed Specimen: CSF from Tap Updated: 05/26/22 1107      "Significant Indicator NEG     Source CSF     Site TAP     Culture Result No growth at 48 hours.     Gram Stain Result Rare WBCs.  No organisms seen.      Culture Respiratory W/ Grm Stn - BAL [[id number]]     Order Status: Completed Specimen: Respirate from Bronchoalveolar Lavage     BLOOD CULTURE x2 [[id number]]  (Abnormal) Collected: 05/24/22 1122    Order Status: Completed Specimen: Blood from Peripheral Updated: 05/26/22 0952     Significant Indicator POS     Source BLD     Site PERIPHERAL     Culture Result Growth detected by Bactec instrument. 05/24/2022  23:30      Escherichia coli  See previous culture for sensitivity report.      Narrative:      CALL  [place]  19 tel. [phone],  CALLED  19 tel. [phone] 05/24/2022, 23:31, RB PERF. RESULTS CALLED TO: RN  [id number]  Per Hospital Policy: Only change Specimen Src: to \"Line\" if  specified by physician order.  Left Hand    BLOOD CULTURE x2 [[id number]]  (Abnormal)  (Susceptibility) Collected: 05/24/22 1124    Order Status: Completed Specimen: Blood from Peripheral Updated: 05/26/22 0949     Significant Indicator POS     Source BLD     Site PERIPHERAL     Culture Result Growth detected by Bactec instrument. 05/24/2022  23:30      Escherichia coli    Narrative:      CALL  [facility]  19 tel. [phone],  CALLED  19 tel. [phone] 05/24/2022, 23:31, RB PERF. RESULTS CALLED TO:  [id number]  Per Hospital Policy: Only change Specimen Src: to \"Line\" if  specified by physician order.  Right Hand    Susceptibility     Escherichia coli (1)     Antibiotic Interpretation Microscan   Method Status    Ampicillin Resistant >16 mcg/mL CESIA Final    Ceftriaxone Sensitive <=1 mcg/mL CESIA Final    Cefazolin Sensitive <=2 mcg/mL CESIA Final    Ciprofloxacin Sensitive <=0.25 mcg/mL CESIA Final    Cefepime Sensitive <=2 mcg/mL CESIA Final    Cefuroxime Sensitive <=4 mcg/mL CESIA Final    Ampicillin/sulbactam Intermediate 16/8 mcg/mL CESIA Final    Ertapenem Sensitive <=0.5 mcg/mL CESIA Final    Tobramycin " Sensitive <=2 mcg/mL CESIA Final    Gentamicin Sensitive <=2 mcg/mL CESIA Final    Minocycline Sensitive <=4 mcg/mL CESIA Final    Moxifloxacin Sensitive <=2 mcg/mL CESIA Final    Pip/Tazobactam Sensitive <=8 mcg/mL CESIA Final    Trimeth/Sulfa Resistant >2/38 mcg/mL CESIA Final    Tigecycline Sensitive <=2 mcg/mL CESIA Final                   RESP CULTURE [[id number]] Collected: 05/26/22 0944    Order Status: Canceled Specimen: Other from Bronchoalveolar Lavage     GRAM STAIN [[id number]] Collected: 05/24/22 1325    Order Status: Completed Specimen: CSF Updated: 05/24/22 1545     Significant Indicator .     Source CSF     Site TAP     Gram Stain Result Rare WBCs.  No organisms seen.      BLOOD CULTURE x2 [[id number]]     Order Status: Sent Specimen: Blood from Peripheral     CULTURE WOUND W/ GRAM STAIN [[id number]]     Order Status: Canceled Specimen: Wound from Right Leg     URINALYSIS,CULTURE IF INDICATED [[id number]]  (Abnormal) Collected: 05/24/22 1245    Order Status: Sent Specimen: Urine, Cath Updated: 05/24/22 1323     Color Yellow     Character Clear     Specific Gravity 1.017     Ph 5.0     Glucose 500 mg/dL      Ketones Negative mg/dL      Protein Negative mg/dL      Bilirubin Negative     Urobilinogen, Urine 0.2     Nitrite Negative     Leukocyte Esterase Negative     Occult Blood Negative     Micro Urine Req see below     Comment: Microscopic examination not performed when specimen is clear  and chemically negative for protein, blood, leukocyte esterase  and nitrite.         Narrative:      Indication for culture:->Acute unexplained altered mental  status ONLY after ruling out other recognized cause    COV-2, FLU A/B, AND RSV BY PCR (2-4 HOURS CEPHEID): Collect NP swab in VTM [[id number]] Collected: 05/24/22 1130    Order Status: Completed Specimen: Respirate Updated: 05/24/22 1243     Influenza virus A RNA Negative     Influenza virus B, PCR Negative     RSV, PCR Negative     SARS-CoV-2 by PCR NotDetected     Comment:  "PATIENTS: Important information regarding your results and instructions can  be found at [email or web address]   \"After your  Covid-19 Test\"    [facility] providers: PLEASE REFER TO DE-ESCALATION AND RETESTING PROTOCOL  on [email or web address]    **The [employer] GeneXpert Xpress SARS-CoV-2 RT-PCR Test has been made  available for use under the Emergency Use Authorization (EUA) only.          SARS-CoV-2 Source NP Swab          Assessment:  66 y.o. man with a history of uncontrolled type 2 diabetes mellitus, history of prior renal transplant on chronic immunosuppression, stage III chronic kidney disease, atrial fibrillation on anticoagulation, and prior right total knee arthroplasty admitted on 5/24/2022 secondary to altered mentation in the setting of a ground-level fall and hit his right shin.  Patient found to be in septic shock with gram-negative bacteremia and right lower extremity cellulitis without any evidence of necrotizing fasciitis.  His right lower extremity cellulitis appears to be improving.    On the morning of 5/26 was noted to be obtunded with respiratory distress so was intubated.    Pertinent diagnoses:  Septic shock, remains on pressors  Ventilatory dependent respiratory failure, intubated on 5/26  E. coli bacteremia, source possibly below  Right lower extremity cellulitis  Acute kidney injury on stage III chronic kidney disease, now on CRRT  Rhabdomyolysis  Leukocytosis  Thrombocytopenia, worse  Acute encephalopathy  Uncontrolled type 2 diabetes mellitus, hemoglobin A1c 10.7  History of right total knee arthroplasty  History of renal transplant on tacrolimus, mycophenolate and prednisone  Recent fall    Plan:  -Continue IV Zosyn, renally dosed   -Follow blood cultures on 5/24-E coli, resistant to ampicillin, intermediate resistance to Unasyn  -Diabetes education and blood sugar control  -Avoid nephrotoxins  -Continue supportive care by primary team  -Nephrology following- now on " dialysis  -Anticipate a 7-day course of antibiotics from 5/24.  Stop date 05/27/2022 if no further issues     Prognosis guarded    I have performed a physical exam and reviewed and updated ROS and plan today 5/27/2022.  In review of yesterday's note 5/26/2022, there are no changes except as documented above.      Plan of care discussed with intensivist, Dr. Swartz

## 2022-05-27 NOTE — CARE PLAN
Problem: Nutritional:  Goal: Nutrition support tolerated and meeting greater than 85% of estimated needs  Outcome: Met     TF @ goal per Flowsheet (Novasource renal @ 45mL/hr)

## 2022-05-27 NOTE — CARE PLAN
Problem: Ventilation  Goal: Ability to achieve and maintain unassisted ventilation or tolerate decreased levels of ventilator support  Description: Target End Date:  4 days     Document on Vent flowsheet    1.  Support and monitor invasive and noninvasive mechanical ventilation  2.  Monitor ventilator weaning response  3.  Perform ventilator associated pneumonia prevention interventions  4.  Manage ventilation therapy by monitoring diagnostic test results  Outcome: Not Progressing     Ventilator Daily Summary    Vent Day # 2  8 @ 27    APVCMV: 32/530/+8/60%    Ventilator settings changed this shift: No    Weaning trials: No    Respiratory Procedures: No    Plan: Continue current ventilator settings and wean mechanical ventilation as tolerated per physician orders.

## 2022-05-27 NOTE — PROGRESS NOTES
"Critical Care Medicine Faculty Progress Note    HPI: 66y M Hx of renal transplant 9/10/2010 for end stage polycystic kidney dz maintain on prednisone, tacrolimus and mcyophenolate, DM, chronic thrombocytopenia, CKD 3B, Pafib, bronchitis, Covid 19 11/2020. That on Sunday hit his shin. He since has been complaining of pain. This morning he called his younger brother. His brother went to his house found him altered. He was brought in by EMS as a trauma activation since he had AMS and bruising from his fall. CT head negative, CTA chest negative, CT abdomen negative, CT spine negative other then degenerative findings. He was in severe septic shock, started on pressors and fluid bolus. He was given broad spectrum microbials. His only compliant is his right leg. LP is currently pending and was traumatic. He would like to be full code.     Exam:Ill appearing on ventilator and multiple support devices, bruising to left forehead and scratch to right frontal area, lungs mechanical coarse, heart irregular without murmurs, abdomen soft nttp, ext with edema, cold hands and bilateral feet but warm knees and rest of extremities. Right lower shin with bruising and small old upside down \"U\" cut scabbed no fluctuance, no blistering, no groin adenopathy, Neuro he does awaje appropriately moves all ext while on sedation/ventilator.     Rounding Report:  Neuro: dilaudid 0.5  HR: 55-80  SBP: norepi 55  Tmax: afebrile  GI: TF at goal, BM 5/26  UOP: 20ml  Lines: right IJ central, left IJ dialysis, right radial marguerite, fernandez  Resp: vent 2 8 30%, min secretions   Vte: heparin gtt  PPI/H2:pepcid  Antibx: 4/7 zosyn for coli bacteremia      A/P:  Change to just use levophed stop vasopressin with his cold ext  Increase volume removal with CRRT  Inc peep 10 to improve volume loss  Follow up on duplex arterial u/s      Severe septic shock likely related to severe soft tissue infection: Initially Concerns for Toxic shock syndrome with his " erythroderma  Empiric rx clinda + zosyn + linezolid transitioned to Zosyn guided by ID with his E Coli bacteremia. Monitor need for surgical debridement appreciate consultation: pain disproportionate to exam, pain/edema beyond erythema, anesthesia, crepitus, blistering/bullae, hyponatremia, rapid progression, gas on imaging or facial edema or enhancement (avoid using LRINEC score poor sensitivity)  Consider Bx of tissue to rule out fasciitis/myositis or cut down looking for dishwater color fluid  Fluid therapy with resus and norepinephrine for map > 65    Septic Shock: s/p fluid resus, monitor end organ, lactate continue with norepinephrine    E Coli Bacteremia: Blood 2/2 5/24 follow up speciation continue broad spectrum antibiotics until speciation and potential polymycorbial, ID consulting, intermediate resistance pattern.     Chronic immunosuppression: hold with severe life threatening sepsis   Stress dose steroids for relative adrenal insufficiency. Tacrolimius level 5.4 3/2022 follow repeat level on admission.     Encephalopathy: improved likely hypoperfusion and septic. Serial neuro exam. LP negative for meningitis    Thrombocytopenia: chronic serial monitor monitor for bleeding, trend    SANKET on CKD: Hx of CKD s/p renal transplant, acute renal injury related to septic shock and ischemic atn s/p contrast die load, avoid nephrotoxins, check CPK, nephrology consultation, avoid hyperchloremic resus fluid. Started on CRRT 5/26. Daily lasix to monitor for renal recovery.     Lactic acidosis: due to sepsis serial monitor, also poor clearence with renal injury.     DM: aggressive glucose control for better source of infection consider insulin gtt. HgA1C 10.7.    Volume overload: Net + 16L judicious fluid management.  CRRT for volume removal.     Hyperkalemia: S/p hyper K cocktail 5/26, Started on CRRT 5/26, daily labs.     Cardiomyopathy: likely septic vs ischemic and old AMI in nature wean off vasopressin and use  norepinephrine for ionopressor, EF 35% with LVH    Paroxysmal Atrial fibrillation: hx of continue to optimize filling pressure and electrolytes, he is rate controlled, start anticoagulation. Patient worsens with epinephrine gtt.     Acute hypoxic hypercarbic respiratory failure: intubated 5/26 for airway protection and respiratory failure, A-F bundle, daily SAT/SBT, aspiration precautions.     Lower extremity ischemia: follow up on duplex u/s likely related to vasopressors, wean off vasopressin map goal 65 strict, warm extremities with blankets, serial monitor.     Patient remains in critical condition from septic shock and severe soft tissue skin infection and titration of norepinephrine, ventilator, CRRT  and multiple bedside evaluation and family updates. Critical care time provided was 85 minutes. This excludes all separate billable procedures.       Reuben Swartz MD  Critical Care Medicine

## 2022-05-27 NOTE — DISCHARGE PLANNING
Hospital Care Management- Medical Social Work      LMSW responded to code blue and met with pt's partner, Linda, at bedside. LMSW introduced self and role within care team. Pt's dtr, Mena, was present once code started but left due to feeling overwhelmed. Linda reports that dtr Mena and son Afshin and his wife are involved and aware what is happening. Linda called Afshin and provided update that pt had ROSC. Dr. Swartz provided update to Linda on plan of care and that pt is still very critically ill. LMSW advised Linda that this writer and  are available as needed for ongoing support.

## 2022-05-27 NOTE — CARE PLAN
The patient is      Shift Goals  Clinical Goals: hemodynamic stablility, wean pressors  Patient Goals: unable to assess  Family Goals: continued updates    Progress made toward(s) clinical / shift goals:  able to wean off of umesh, started producing trace urine    Problem: Knowledge Deficit - Standard  Goal: Patient and family/care givers will demonstrate understanding of plan of care, disease process/condition, diagnostic tests and medications  5/27/2022 0249 by Aicha Dominguez, R.N.  Outcome: Progressing  5/27/2022 0245 by Aicha Dominguez R.N.  Outcome: Progressing     Problem: Pain - Standard  Goal: Alleviation of pain or a reduction in pain to the patient’s comfort goal  Outcome: Progressing     Problem: Fall Risk  Goal: Patient will remain free from falls  Outcome: Progressing       Patient is not progressing towards the following goals: edematous and developing fluid blisters, not progressing towards freedom from restraints      Problem: Skin Integrity  Goal: Skin integrity is maintained or improved  5/27/2022 0249 by Aicha Dominguez, R.N.  Outcome: Not Progressing  5/27/2022 0245 by Aicha Dominguez, R.N.  Outcome: Not Progressing     Problem: Safety - Medical Restraint  Goal: Free from restraint(s) (Restraint for Interference with Medical Device)  5/27/2022 0249 by Aicha Dominguez, R.N.  Outcome: Not Progressing  5/27/2022 0245 by Aicha Dominguez, R.N.  Outcome: Not Progressing

## 2022-05-27 NOTE — PROGRESS NOTES
[facility] Services Progress Note     End of shift rounding visit done, Pt on CRRT, still intubated and sedated,  tolerating tx well, Art. /65 , on 3 pressors for BP support, MONIKA Iverson. updated over the phone, order to increase UF goal to 100-200 ml as BP tolerates or 75 ml/hr if Pt's not tolerating prescribed UFG received, CRRT orders updated. Circuit flushed, no signs of clotting observed.    Net UF from [id number] = 428 mL ( UF : 599 mL - NS given : 171 mL )     In-service including troubleshooting and on-call service provided to Mary Alice Dominguez, primary RNs.

## 2022-05-27 NOTE — PROGRESS NOTES
"Pulmonary/Critical Care Medicine    RN reported can no longer find doppler pedal pulses with bedside doppler at 0400 assessment but previously have faint palpable pulses last night. He has cold feet bilateral with duskiness/mottlling, some blisters and potential early ischemia. Patient is currently on CCRT with Levophed at 50 mcg and Vaso at 0.03. Patient was on multiple pressors. Epi and umesh synephrine have been slowly weaned off. Bedside ultrasound utilized, appears to have some collateral arterial pedal pulses on both feet but barely \"flickers/pulsates\" with underlying subcutaneous edema. Plan to keep weaning the pressors for map of 65 mmHg. Will obtain arterial duplex US, continue CRRT/Vent support and hold the lasix. Discussed plan with nurses. Discussed with Dr Younger, further recommendations to follow.    Patient remained in critical condition with imminent risk of deterioration/death  A total of 30 minutes critical care time, excluding procedures and no time overlap    Mae Alfaro CCRN, AGACNP APRN-BC  [facility]    "

## 2022-05-27 NOTE — PROGRESS NOTES
[facility] Progress Note      Continuous CVVHD treatment ordered by Dr. Sheppard. Pt tolerating tx well without any issues.     Total UF (1900 - 0600): 4073mL. See flow sheet for details.     Circuit assessed for clotting with no signs of impending clots at this time. No need to change cartridge.     In-service given to JERRICA Holland RN. Please call Dialysis Room [phone] for questions or machine troubleshooting.

## 2022-05-27 NOTE — PROGRESS NOTES
[facility] Services Progress Note    CRRT started at 1608, running continuously ordered by MONIKA Iverson. Labile BP noted, arterial  SBP : 90's , Pt is now on 3 pressors for BP support, Pt intubated and sedated, restless at times. Family members at bedside. Pt started with UF goal 25 ml/hr , tolerating well, increased to UF goal of 50 ml/hr after the 1st hour of Tx, BP remained the same.  In-service including troubleshooting and on-call service provided to Vincenzo Holland Primary RN.

## 2022-05-27 NOTE — PROGRESS NOTES
Dr Swartz at bedside. Examined pt and addressed poor perfusion/ ischemia of lower extremities. Received orders to turn off vaso, titrate up on levo as needed for MAP goal of 65.

## 2022-05-27 NOTE — PROGRESS NOTES
1200 PEA, CPR initiated  1201 Epi 1 mg   1202 2 amps of sodium bicarb  1204 ROSC  1206 HD RN called to assist with blood return  1210 HD RN unable to return blood d/t clot   [id number] NS bolus

## 2022-05-27 NOTE — PROGRESS NOTES
tech from Lab called with critical result of lactic acid at 4.9. Critical lab result read back to tech.   Dr. duran notified of critical lab result at 2133.  Critical lab result read back by Dr. Duran.

## 2022-05-27 NOTE — CARE PLAN
The patient is Unstable - High likelihood or risk of patient condition declining or worsening    Shift Goals  Clinical Goals: hemodynamic stability,CRRT  Patient Goals: unable to assess  Family Goals: stay updated    Progress made toward(s) clinical / shift goals:    Problem: Knowledge Deficit - Standard  Goal: Patient and family/care givers will demonstrate understanding of plan of care, disease process/condition, diagnostic tests and medications  Outcome: Progressing     Problem: Pain - Standard  Goal: Alleviation of pain or a reduction in pain to the patient’s comfort goal  Outcome: Progressing     Problem: Skin Integrity  Goal: Skin integrity is maintained or improved  Outcome: Progressing     Problem: Fall Risk  Goal: Patient will remain free from falls  Outcome: Progressing     Problem: Safety - Medical Restraint  Goal: Remains free of injury from restraints (Restraint for Interference with Medical Device)  Outcome: Progressing  Goal: Free from restraint(s) (Restraint for Interference with Medical Device)  Outcome: Progressing

## 2022-05-27 NOTE — PROGRESS NOTES
Received call from RN regarding returned blood form CRRT d/t coding at 1205. This RN arrived at bedside. Pt paused already and lines appeared to be clotted. No blood returned. Pt disconnected from machined. And CVC flushed, heparinized, limbs clamp engaged, capped. Pt will be HD today. Report to JERRICA Holland RN.

## 2022-05-27 NOTE — PROGRESS NOTES
"[facility] -  PROGRESS NOTE               Author: James Sheppard M.D. Date & Time: 5/27/2022  12:00 PM     HPI:  Patient is a 66 year old male with a PMHx of renal transplant 9/10/2010 for polycystic kidney disease, DM, thrombocytopenia, CKD3b, pafib, covid19 infection, who presents for AMS.  He was brought in activated trauma for fall, CT imaging has been negative, but was in severe septic shock started on pressors and give nfluid boluses.  On broad spectrum abx.  Currently complains of right leg pain but thinks its better.  Confirms his last dose of tacrolimus and cellcept was yesterday.  Currently on levophed    DAILY NEPHROLOGY SUMMARY:  5/24: Consult done  5/25: NAEO, no complaints, feels a bit better, family at bedside  5/26: Decompensated overnight, intubated due to resp. Distress and pressors initiated  5/27: NAEO, no complaints, hemodynamics decompensated and CRRT initiated instead, tolerated well overnight, still on it this AM    REVIEW OF SYSTEMS:    10 point ROS reviewed and is as per HPI/daily summary or otherwise negative    PMH/PSH/SH/FH:   Reviewed and unchanged since admission note    CURRENT MEDICATIONS:   Reviewed from admission to present day    VS:  /61   Pulse (!) 121   Temp 37.1 °C (98.8 °F) (Bladder)   Resp (!) 35   Ht 1.956 m (6' 5\")   Wt 119 kg (261 lb 14.5 oz)   SpO2 98%   BMI 31.06 kg/m²     Physical Exam  Nursing note reviewed.   Constitutional:       Appearance: He is ill-appearing.      Interventions: He is sedated and intubated.   Eyes:      General: No scleral icterus.  Cardiovascular:      Comments: No edema  Pulmonary:      Effort: Pulmonary effort is normal. He is intubated.   Abdominal:      General: There is no distension.   Musculoskeletal:         General: No deformity.   Skin:     Findings: Lesion (RLE with open ulcer) present. No rash.       Fluids:  In: 6464.6 [I.V.:4555.3; Dialysis:171; Enteral:30]  Out: 671     LABS:  Recent Labs    "  05/26/22  1655 05/26/22  2040 05/27/22  0516   SODIUM 142 140 137   POTASSIUM 5.6* 5.3 5.3   CHLORIDE 105 105 103   CO2 12* 13* 15*   GLUCOSE 160* 160* 217*   BUN 61* 48* 40*   CREATININE 5.41* 4.29* 2.99*   CALCIUM 6.7* 7.1* 7.7*     IMAGING:   All imaging reviewed from admission to present day    IMPRESSION:  # SANKET  - Etiology likely 2/2 ATN  - SCr rising  - Non-oliguric, but UOP dropping it seems  - TempCath placed by ICU  # CKD Stage 3b  - BCr ~ 1.5-1.9  - Etiology likely 2/2 HTN/DM  # E. Coli bacteremia  - Source unclear  - Abx on board  # Hyperkalemia  - No ECG changes  # AMS  - Etiology likely 2/2 hypoperfusion/sepsis  # Acute respiratory failure  - Intubated 5/26  # DDKT  - Etiology 2/2 ADPKD  - XPL in 2010  - On Tac/MMF/Pred regimen but being held due to sepsis  # Hypotension  - Etiology unclear  # RLE ulcer  - Hit his shin last weekend, could be source of his sepsis  # type 2 DM    PLAN:  - Continue CRRT  - UF as tolerated  - May convert to iHD tomorrow  - Daily labs  - Monitor I/O  - Maintain MAP > 65  - Continue holding IS meds for now  - Dose all meds per eGFR < 15    I have personally reviewed all laboratory values, microbiology studies, radiographic images, and current medications.  The patient is critically ill with one or more organ system(s) failing and without intervention, survival is jeopardized.  To prevent further life threatening deterioration and/or organ failure, I have spent Critical Care time in direct patient care/floor time, of HIGH Medical decision making, exclusive of procredures, and without overlapping physician care.    Brief Description of CC services performed:  Evaluation of notes from various team members for RRT needs and arrangement of it if needed.  Discussion with staff regarding renal care and plan moving forward.    Total CC Time = 31 minutes

## 2022-05-28 ENCOUNTER — APPOINTMENT (OUTPATIENT)
Dept: RADIOLOGY | Facility: MEDICAL CENTER | Age: 67
DRG: 870 | End: 2022-05-28
Attending: INTERNAL MEDICINE
Payer: MEDICARE

## 2022-05-28 LAB
ALBUMIN SERPL BCP-MCNC: 2.3 G/DL (ref 3.2–4.9)
ALBUMIN/GLOB SERPL: 0.7 G/DL
ALP SERPL-CCNC: 128 U/L (ref 30–99)
ALT SERPL-CCNC: 29 U/L (ref 2–50)
ANION GAP SERPL CALC-SCNC: 14 MMOL/L (ref 7–16)
ANION GAP SERPL CALC-SCNC: 15 MMOL/L (ref 7–16)
ANION GAP SERPL CALC-SCNC: 20 MMOL/L (ref 7–16)
ANION GAP SERPL CALC-SCNC: 20 MMOL/L (ref 7–16)
APPEARANCE FLD: NORMAL
AST SERPL-CCNC: 27 U/L (ref 12–45)
BACTERIA BRONCH AEROBE CULT: NORMAL
BASE EXCESS BLDA CALC-SCNC: -4 MMOL/L (ref -4–3)
BASOPHILS # BLD AUTO: 0 % (ref 0–1.8)
BASOPHILS # BLD: 0 K/UL (ref 0–0.12)
BILIRUB SERPL-MCNC: 0.8 MG/DL (ref 0.1–1.5)
BODY FLD TYPE: NORMAL
BODY FLD TYPE: NORMAL
BODY TEMPERATURE: ABNORMAL DEGREES
BREATHS SETTING VENT: 32
BUN SERPL-MCNC: 38 MG/DL (ref 8–22)
BUN SERPL-MCNC: 39 MG/DL (ref 8–22)
BUN SERPL-MCNC: 45 MG/DL (ref 8–22)
BUN SERPL-MCNC: 49 MG/DL (ref 8–22)
BURR CELLS BLD QL SMEAR: NORMAL
CALCIUM SERPL-MCNC: 7.5 MG/DL (ref 8.5–10.5)
CALCIUM SERPL-MCNC: 7.7 MG/DL (ref 8.5–10.5)
CALCIUM SERPL-MCNC: 8.1 MG/DL (ref 8.5–10.5)
CALCIUM SERPL-MCNC: 8.2 MG/DL (ref 8.5–10.5)
CHLORIDE SERPL-SCNC: 100 MMOL/L (ref 96–112)
CHLORIDE SERPL-SCNC: 98 MMOL/L (ref 96–112)
CHLORIDE SERPL-SCNC: 99 MMOL/L (ref 96–112)
CHLORIDE SERPL-SCNC: 99 MMOL/L (ref 96–112)
CO2 BLDA-SCNC: 19 MMOL/L (ref 20–33)
CO2 SERPL-SCNC: 15 MMOL/L (ref 20–33)
CO2 SERPL-SCNC: 16 MMOL/L (ref 20–33)
CO2 SERPL-SCNC: 22 MMOL/L (ref 20–33)
CO2 SERPL-SCNC: 23 MMOL/L (ref 20–33)
COLOR FLD: NORMAL
CREAT SERPL-MCNC: 2.58 MG/DL (ref 0.5–1.4)
CREAT SERPL-MCNC: 2.78 MG/DL (ref 0.5–1.4)
CREAT SERPL-MCNC: 3.03 MG/DL (ref 0.5–1.4)
CREAT SERPL-MCNC: 3.05 MG/DL (ref 0.5–1.4)
CRYSTALS FLD MICRO: NORMAL
CSF COMMENTS 1658: NORMAL
DELSYS IDSYS: ABNORMAL
END TIDAL CARBON DIOXIDE IECO2: 25 MMHG
EOSINOPHIL # BLD AUTO: 0 K/UL (ref 0–0.51)
EOSINOPHIL NFR BLD: 0 % (ref 0–6.9)
ERYTHROCYTE [DISTWIDTH] IN BLOOD BY AUTOMATED COUNT: 48.7 FL (ref 35.9–50)
GFR SERPLBLD CREATININE-BSD FMLA CKD-EPI: 22 ML/MIN/1.73 M 2
GFR SERPLBLD CREATININE-BSD FMLA CKD-EPI: 22 ML/MIN/1.73 M 2
GFR SERPLBLD CREATININE-BSD FMLA CKD-EPI: 24 ML/MIN/1.73 M 2
GFR SERPLBLD CREATININE-BSD FMLA CKD-EPI: 27 ML/MIN/1.73 M 2
GLOBULIN SER CALC-MCNC: 3.1 G/DL (ref 1.9–3.5)
GLUCOSE BLD STRIP.AUTO-MCNC: 175 MG/DL (ref 65–99)
GLUCOSE BLD STRIP.AUTO-MCNC: 202 MG/DL (ref 65–99)
GLUCOSE BLD STRIP.AUTO-MCNC: 210 MG/DL (ref 65–99)
GLUCOSE BLD STRIP.AUTO-MCNC: 224 MG/DL (ref 65–99)
GLUCOSE BLD STRIP.AUTO-MCNC: 225 MG/DL (ref 65–99)
GLUCOSE BLD STRIP.AUTO-MCNC: 230 MG/DL (ref 65–99)
GLUCOSE BLD STRIP.AUTO-MCNC: 231 MG/DL (ref 65–99)
GLUCOSE BLD STRIP.AUTO-MCNC: 239 MG/DL (ref 65–99)
GLUCOSE BLD STRIP.AUTO-MCNC: 240 MG/DL (ref 65–99)
GLUCOSE BLD STRIP.AUTO-MCNC: 240 MG/DL (ref 65–99)
GLUCOSE BLD STRIP.AUTO-MCNC: 246 MG/DL (ref 65–99)
GLUCOSE BLD STRIP.AUTO-MCNC: 280 MG/DL (ref 65–99)
GLUCOSE BLD STRIP.AUTO-MCNC: 307 MG/DL (ref 65–99)
GLUCOSE BLD STRIP.AUTO-MCNC: 326 MG/DL (ref 65–99)
GLUCOSE BLD STRIP.AUTO-MCNC: 338 MG/DL (ref 65–99)
GLUCOSE BLD STRIP.AUTO-MCNC: 340 MG/DL (ref 65–99)
GLUCOSE SERPL-MCNC: 262 MG/DL (ref 65–99)
GLUCOSE SERPL-MCNC: 277 MG/DL (ref 65–99)
GLUCOSE SERPL-MCNC: 284 MG/DL (ref 65–99)
GLUCOSE SERPL-MCNC: 346 MG/DL (ref 65–99)
GRAM STN SPEC: NORMAL
GRAM STN SPEC: NORMAL
HCO3 BLDA-SCNC: 18.6 MMOL/L (ref 17–25)
HCT VFR BLD AUTO: 43.3 % (ref 42–52)
HGB BLD-MCNC: 14.3 G/DL (ref 14–18)
HOROWITZ INDEX BLDA+IHG-RTO: 220 MM[HG]
LYMPHOCYTES # BLD AUTO: 0 K/UL (ref 1–4.8)
LYMPHOCYTES NFR BLD: 0 % (ref 22–41)
LYMPHOCYTES NFR FLD: 6 %
MAGNESIUM SERPL-MCNC: 1.7 MG/DL (ref 1.5–2.5)
MANUAL DIFF BLD: NORMAL
MCH RBC QN AUTO: 28.9 PG (ref 27–33)
MCHC RBC AUTO-ENTMCNC: 33 G/DL (ref 33.7–35.3)
MCV RBC AUTO: 87.7 FL (ref 81.4–97.8)
MODE IMODE: ABNORMAL
MONOCYTES # BLD AUTO: 1.01 K/UL (ref 0–0.85)
MONOCYTES NFR BLD AUTO: 3.5 % (ref 0–13.4)
MONONUC CELLS NFR FLD: 6 %
MORPHOLOGY BLD-IMP: NORMAL
NEUTROPHILS # BLD AUTO: 27.89 K/UL (ref 1.82–7.42)
NEUTROPHILS NFR BLD: 95.6 % (ref 44–72)
NEUTROPHILS NFR FLD: 88 %
NEUTS BAND NFR BLD MANUAL: 0.9 % (ref 0–10)
NRBC # BLD AUTO: 0.03 K/UL
NRBC BLD-RTO: 0.1 /100 WBC
O2/TOTAL GAS SETTING VFR VENT: 50 %
OVALOCYTES BLD QL SMEAR: NORMAL
PCO2 BLDA: 27.6 MMHG (ref 26–37)
PCO2 TEMP ADJ BLDA: 29.5 MMHG (ref 26–37)
PEEP END EXPIRATORY PRESSURE IPEEP: 8 CMH20
PH BLDA: 7.44 [PH] (ref 7.4–7.5)
PH TEMP ADJ BLDA: 7.41 [PH] (ref 7.4–7.5)
PHOSPHATE SERPL-MCNC: 2.8 MG/DL (ref 2.5–4.5)
PLATELET # BLD AUTO: 58 K/UL (ref 164–446)
PLATELET BLD QL SMEAR: NORMAL
PO2 BLDA: 110 MMHG (ref 64–87)
PO2 TEMP ADJ BLDA: 120 MMHG (ref 64–87)
POIKILOCYTOSIS BLD QL SMEAR: NORMAL
POTASSIUM SERPL-SCNC: 3.5 MMOL/L (ref 3.6–5.5)
POTASSIUM SERPL-SCNC: 3.5 MMOL/L (ref 3.6–5.5)
POTASSIUM SERPL-SCNC: 4.3 MMOL/L (ref 3.6–5.5)
POTASSIUM SERPL-SCNC: 4.3 MMOL/L (ref 3.6–5.5)
PROT SERPL-MCNC: 5.4 G/DL (ref 6–8.2)
RBC # BLD AUTO: 4.94 M/UL (ref 4.7–6.1)
RBC # FLD: 5000 CELLS/UL
RBC BLD AUTO: PRESENT
SAO2 % BLDA: 99 % (ref 93–99)
SIGNIFICANT IND 70042: NORMAL
SIGNIFICANT IND 70042: NORMAL
SITE SITE: NORMAL
SITE SITE: NORMAL
SODIUM SERPL-SCNC: 134 MMOL/L (ref 135–145)
SODIUM SERPL-SCNC: 135 MMOL/L (ref 135–145)
SODIUM SERPL-SCNC: 136 MMOL/L (ref 135–145)
SODIUM SERPL-SCNC: 136 MMOL/L (ref 135–145)
SOURCE SOURCE: NORMAL
SOURCE SOURCE: NORMAL
SPECIMEN DRAWN FROM PATIENT: ABNORMAL
TIDAL VOLUME IVT: 530 ML
UFH PPP CHRO-ACNC: 0.23 IU/ML
UFH PPP CHRO-ACNC: 0.48 IU/ML
UFH PPP CHRO-ACNC: 0.57 IU/ML
WBC # BLD AUTO: 28.9 K/UL (ref 4.8–10.8)
WBC # FLD: 2638 CELLS/UL

## 2022-05-28 PROCEDURE — 700105 HCHG RX REV CODE 258: Performed by: INTERNAL MEDICINE

## 2022-05-28 PROCEDURE — 83735 ASSAY OF MAGNESIUM: CPT

## 2022-05-28 PROCEDURE — 700111 HCHG RX REV CODE 636 W/ 250 OVERRIDE (IP): Performed by: INTERNAL MEDICINE

## 2022-05-28 PROCEDURE — 82803 BLOOD GASES ANY COMBINATION: CPT

## 2022-05-28 PROCEDURE — 700102 HCHG RX REV CODE 250 W/ 637 OVERRIDE(OP): Performed by: NURSE PRACTITIONER

## 2022-05-28 PROCEDURE — 89051 BODY FLUID CELL COUNT: CPT

## 2022-05-28 PROCEDURE — 85025 COMPLETE CBC W/AUTO DIFF WBC: CPT

## 2022-05-28 PROCEDURE — 80053 COMPREHEN METABOLIC PANEL: CPT

## 2022-05-28 PROCEDURE — 94799 UNLISTED PULMONARY SVC/PX: CPT

## 2022-05-28 PROCEDURE — 99291 CRITICAL CARE FIRST HOUR: CPT | Performed by: INTERNAL MEDICINE

## 2022-05-28 PROCEDURE — 700102 HCHG RX REV CODE 250 W/ 637 OVERRIDE(OP): Performed by: INTERNAL MEDICINE

## 2022-05-28 PROCEDURE — 80048 BASIC METABOLIC PNL TOTAL CA: CPT | Mod: 91

## 2022-05-28 PROCEDURE — 71045 X-RAY EXAM CHEST 1 VIEW: CPT

## 2022-05-28 PROCEDURE — 87205 SMEAR GRAM STAIN: CPT

## 2022-05-28 PROCEDURE — 89060 EXAM SYNOVIAL FLUID CRYSTALS: CPT

## 2022-05-28 PROCEDURE — 90935 HEMODIALYSIS ONE EVALUATION: CPT

## 2022-05-28 PROCEDURE — 700101 HCHG RX REV CODE 250: Performed by: INTERNAL MEDICINE

## 2022-05-28 PROCEDURE — 85520 HEPARIN ASSAY: CPT | Mod: 91

## 2022-05-28 PROCEDURE — 5A1D70Z PERFORMANCE OF URINARY FILTRATION, INTERMITTENT, LESS THAN 6 HOURS PER DAY: ICD-10-PCS | Performed by: INTERNAL MEDICINE

## 2022-05-28 PROCEDURE — 94003 VENT MGMT INPAT SUBQ DAY: CPT

## 2022-05-28 PROCEDURE — 82962 GLUCOSE BLOOD TEST: CPT | Mod: 91

## 2022-05-28 PROCEDURE — 87040 BLOOD CULTURE FOR BACTERIA: CPT | Mod: 91

## 2022-05-28 PROCEDURE — 84100 ASSAY OF PHOSPHORUS: CPT

## 2022-05-28 PROCEDURE — 87070 CULTURE OTHR SPECIMN AEROBIC: CPT

## 2022-05-28 PROCEDURE — 770022 HCHG ROOM/CARE - ICU (200)

## 2022-05-28 PROCEDURE — 85007 BL SMEAR W/DIFF WBC COUNT: CPT

## 2022-05-28 PROCEDURE — 99233 SBSQ HOSP IP/OBS HIGH 50: CPT | Performed by: INTERNAL MEDICINE

## 2022-05-28 PROCEDURE — 99292 CRITICAL CARE ADDL 30 MIN: CPT | Performed by: INTERNAL MEDICINE

## 2022-05-28 PROCEDURE — A9270 NON-COVERED ITEM OR SERVICE: HCPCS | Performed by: NURSE PRACTITIONER

## 2022-05-28 PROCEDURE — 0S993ZX DRAINAGE OF RIGHT HIP JOINT, PERCUTANEOUS APPROACH, DIAGNOSTIC: ICD-10-PCS | Performed by: ORTHOPAEDIC SURGERY

## 2022-05-28 PROCEDURE — 94150 VITAL CAPACITY TEST: CPT

## 2022-05-28 PROCEDURE — 37799 UNLISTED PX VASCULAR SURGERY: CPT

## 2022-05-28 PROCEDURE — A9270 NON-COVERED ITEM OR SERVICE: HCPCS | Performed by: INTERNAL MEDICINE

## 2022-05-28 RX ORDER — DEXTROSE MONOHYDRATE 25 G/50ML
12.5-25 INJECTION, SOLUTION INTRAVENOUS PRN
Status: DISCONTINUED | OUTPATIENT
Start: 2022-05-28 | End: 2022-06-02

## 2022-05-28 RX ORDER — DIGOXIN 0.25 MG/ML
500 INJECTION INTRAMUSCULAR; INTRAVENOUS ONCE
Status: COMPLETED | OUTPATIENT
Start: 2022-05-28 | End: 2022-05-28

## 2022-05-28 RX ADMIN — PROPOFOL 15 MCG/KG/MIN: 10 INJECTION, EMULSION INTRAVENOUS at 05:03

## 2022-05-28 RX ADMIN — HEPARIN SODIUM 20 UNITS/KG/HR: 5000 INJECTION, SOLUTION INTRAVENOUS at 15:02

## 2022-05-28 RX ADMIN — PIPERACILLIN AND TAZOBACTAM 3.38 G: 3; .375 INJECTION, POWDER, LYOPHILIZED, FOR SOLUTION INTRAVENOUS; PARENTERAL at 05:16

## 2022-05-28 RX ADMIN — HYDROMORPHONE HYDROCHLORIDE 0.5 MG: 1 INJECTION, SOLUTION INTRAMUSCULAR; INTRAVENOUS; SUBCUTANEOUS at 14:05

## 2022-05-28 RX ADMIN — SODIUM CHLORIDE 3.5 UNITS/HR: 9 INJECTION, SOLUTION INTRAVENOUS at 07:50

## 2022-05-28 RX ADMIN — FAMOTIDINE 20 MG: 20 TABLET, FILM COATED ORAL at 05:13

## 2022-05-28 RX ADMIN — HYDROCORTISONE SODIUM SUCCINATE 50 MG: 100 INJECTION, POWDER, FOR SOLUTION INTRAMUSCULAR; INTRAVENOUS at 12:37

## 2022-05-28 RX ADMIN — ACETAMINOPHEN 650 MG: 325 TABLET ORAL at 05:15

## 2022-05-28 RX ADMIN — NOREPINEPHRINE BITARTRATE 46 MCG/MIN: 1 INJECTION, SOLUTION, CONCENTRATE INTRAVENOUS at 08:03

## 2022-05-28 RX ADMIN — NOREPINEPHRINE BITARTRATE 46 MCG/MIN: 1 INJECTION, SOLUTION, CONCENTRATE INTRAVENOUS at 05:03

## 2022-05-28 RX ADMIN — HYDROCORTISONE SODIUM SUCCINATE 50 MG: 100 INJECTION, POWDER, FOR SOLUTION INTRAMUSCULAR; INTRAVENOUS at 05:13

## 2022-05-28 RX ADMIN — HEPARIN SODIUM 4100 UNITS: 1000 INJECTION, SOLUTION INTRAVENOUS; SUBCUTANEOUS at 04:51

## 2022-05-28 RX ADMIN — MEROPENEM 500 MG: 500 INJECTION, POWDER, FOR SOLUTION INTRAVENOUS at 10:21

## 2022-05-28 RX ADMIN — FUROSEMIDE 100 MG: 10 INJECTION, SOLUTION INTRAMUSCULAR; INTRAVENOUS at 05:13

## 2022-05-28 RX ADMIN — HYDROMORPHONE HYDROCHLORIDE 0.5 MG: 1 INJECTION, SOLUTION INTRAMUSCULAR; INTRAVENOUS; SUBCUTANEOUS at 05:15

## 2022-05-28 RX ADMIN — SENNOSIDES AND DOCUSATE SODIUM 2 TABLET: 50; 8.6 TABLET ORAL at 05:14

## 2022-05-28 RX ADMIN — MINERAL OIL, PETROLATUM 1 APPLICATION: 425; 573 OINTMENT OPHTHALMIC at 22:30

## 2022-05-28 RX ADMIN — DAKIN'S SOLUTION 0.125% (QUARTER STRENGTH) 473 ML: 0.12 SOLUTION at 05:16

## 2022-05-28 RX ADMIN — NOREPINEPHRINE BITARTRATE 52 MCG/MIN: 1 INJECTION, SOLUTION, CONCENTRATE INTRAVENOUS at 02:16

## 2022-05-28 RX ADMIN — HEPARIN SODIUM 2800 UNITS: 1000 INJECTION, SOLUTION INTRAVENOUS; SUBCUTANEOUS at 10:05

## 2022-05-28 RX ADMIN — MINERAL OIL, PETROLATUM 1 APPLICATION: 425; 573 OINTMENT OPHTHALMIC at 14:05

## 2022-05-28 RX ADMIN — DAKIN'S SOLUTION 0.125% (QUARTER STRENGTH) 473 ML: 0.12 SOLUTION at 18:20

## 2022-05-28 RX ADMIN — MINERAL OIL, PETROLATUM 1 APPLICATION: 425; 573 OINTMENT OPHTHALMIC at 05:16

## 2022-05-28 RX ADMIN — DIGOXIN 500 MCG: 0.25 INJECTION INTRAMUSCULAR; INTRAVENOUS at 10:22

## 2022-05-28 RX ADMIN — SENNOSIDES AND DOCUSATE SODIUM 2 TABLET: 50; 8.6 TABLET ORAL at 18:20

## 2022-05-28 RX ADMIN — HEPARIN SODIUM 18 UNITS/KG/HR: 5000 INJECTION, SOLUTION INTRAVENOUS at 02:16

## 2022-05-28 RX ADMIN — NOREPINEPHRINE BITARTRATE 46 MCG/MIN: 1 INJECTION, SOLUTION, CONCENTRATE INTRAVENOUS at 10:42

## 2022-05-28 RX ADMIN — PROPOFOL 15 MCG/KG/MIN: 10 INJECTION, EMULSION INTRAVENOUS at 20:37

## 2022-05-28 RX ADMIN — HYDROCORTISONE SODIUM SUCCINATE 50 MG: 100 INJECTION, POWDER, FOR SOLUTION INTRAMUSCULAR; INTRAVENOUS at 18:19

## 2022-05-28 ASSESSMENT — PAIN DESCRIPTION - PAIN TYPE
TYPE: ACUTE PAIN

## 2022-05-28 ASSESSMENT — PULMONARY FUNCTION TESTS: FVC: 0

## 2022-05-28 ASSESSMENT — LIFESTYLE VARIABLES: REASON UNABLE TO ASSESS: INTUBATED AND SEDATED

## 2022-05-28 ASSESSMENT — FIBROSIS 4 INDEX: FIB4 SCORE: 7.73

## 2022-05-28 NOTE — PROGRESS NOTES
[facility] Services Progress Note    Hemodialysis treatment x 3 hours completed as ordered per Dr. Alvares  Treatment started at 1002 and ended at 1303.      Net UF Removed: 3,000 mL    Patient tolerated treatment well with vasopressor support x 1 Levophed maintaned @ 46 mcg/min (pre and during tx) for BP support and allow for fluid removal, MAP maintained at >65mmHg throughout treatment  Patient intubated, off/on sedation, baseline A Fib on cardiac monitor HR:100s-140s bpm  Left IJ CVC access with good patency and flow x 2  See Acute HD flow sheets on clinical data notes under media for details.      Post tx access: Left IJ non tunneled triple lumen HD catheter flushed with saline then locked with heparin 1000 units/ml per designated amount in each lumen (see MAR) then clamped, capped aseptically and labeled properly. CVC dressings changed aseptically per policy and labeled properly. No s/s of infection to HD catheter site. Heparin lock to be aspirated prior to next dialysis/CVC use by dialysis RN only. Please do not flush or draw from ports.    Please notify Nephrologist/Dialysis for follow-up.     Report given to primary care nurse LUZ MARIA Galarza RN.

## 2022-05-28 NOTE — PROCEDURES
DATE OF SERVICE:  5/28/2022     PREOPERATIVE DIAGNOSES:  RIGHT knee effusion, fever of unknown origin, septicemia, history of total knee arthroplasty.     POSTOPERATIVE DIAGNOSES:  RIGHT knee effusion, fever of unknown origin, septicemia, history of total knee arthroplasty.     PROCEDURE:  RIGHT knee arthrocentesis.  :  Mehrdad Tim PA-C     ANESTHESIOLOGIST:  None.     SPECIMENS:  11 mL of joint fluid sent for cell count, culture, differentials   and crystal analysis.     COMPLICATIONS:  None.     OPERATIVE INDICATIONS:  The patient is a 66 male in intensive care who presents with a   fever of unknown origin, rLE cellulitis and I was aked to evaluate  And aspirate for possible prosthetic joint infection.  Patient has a normal skin envelope around the right knee.  Given these findings, he   is an appropriately indicated candidate for arthrocentesis.  Patient is unconscious and on mechanical ventilation.     OPERATIVE COURSE:  Patient was positioned supine.  A superolateral portal was   prepped using iodine and then an 18-gauge needle was infiltrated in the knee.    About 12 mL of slightly cloudy straw-colored fluid was obtained.  This was   sent for cell count, differential, and crystals.  Patient tolerated the procedure   well.  Band-Aid was placed at the conclusion of the procedure.     POSTOPERATIVE PLAN:  1.  Follow up on cell count and other lab results.  2.  Further intervention will be determined by results of joint aspirate.  3. NPO pending stat lab results

## 2022-05-28 NOTE — WOUND TEAM
Wound consult received regarding BMS placement, superuser placed BMS. Insert rectal tube orders already in place. BMS care orders placed by this Rn for bedside nursing. Please call for questions or concerns.

## 2022-05-28 NOTE — PROGRESS NOTES
Resumed insulin drip at 11:45 according to protocol, after being held for 30 minutes per protocol instructions. Verified next BG check time with Clinical Pharmacist, Toby Glasgow. Next BG due at 12:45.

## 2022-05-28 NOTE — PROGRESS NOTES
[facility] Progress Note      Admit Date: 5/24/2022    Resident(s): Kayy Hopkins M.D.   Attending:  FRANCISCO MARKS/ Dr. Swartz    Patient ID:    Name:  Jama Altman   YOB: 1955  Age:  66 y.o.  male   MRN:  [id number]    Hospital Course (carried forward and updated):  Jama Altman is a 66 y.o. male with a past medical history of type 2 diabetes mellitus, stage IIIb chronic kidney disease, atrial fibrillation,who presented to the emergency department after a ground-level fall. Family at bedside state that they went to his home to visit and he was able to walk to the door to get them , then walked to his bed. He complained of RLE pain . No c/o fever, chills, n/v, urinary complaints, nor headaches. Family member reports had an episode of loose fecal incontinence   ED course blood pressure 96/50 (down to 60s/40s), respirations 30, pulse 145, O2 88% labs CBC hemoglobin 13.7 platelet 119 procalcitonin 33 lactic acid 9.8 urinalysis unremarkable COVID and flu negative blood cultures pending CSF studies pending (protein, glucose, cell count, culture, meningitis/encephalitis panel); chest x-ray cardiomegaly, atelectasis and/or parenchymal scarring pelvis imaging unremarkable; CT head cerebral atrophy, no acute infarction/hemorrhage, mass; osteoarthritis at C6-7 with osteophytosis and spondylotic changes negative for fractures CTA chest negative, no rib fractures, imaging does note diffuse coronary artery calcification; bibasilar subsegmental atelectasis, right pelvic transplant kidney without hydronephrosis, polycystic left kidney, diverticulosis without diverticulitis noted fast ultrasound negative; Medications given in ED ceftriaxone Zosyn and vancomycin, lidocaine, NS bolus 1500, hydrocortisone succinate     Consultants:  Critical Care  Infectious Disease  Nephrology    Interval Events:  LISA   Yesterday after code was given fentanyl and started on propofol  Has had afib with PVCs intermittently  Levo to  get map >65  Tube feeds at goal  Insulin drip started this a.m. given his glucos  Bcx positive for ecoli  Drop vent rate to 30  Zosyn still on board,  Lasix 100 BID dc'd, follow urine output  Vaso and umesh off  SAT trial today  Recheck CMP today  Follow nephro and ID recs    Vitals Range last 24h:  Pulse:  [] 71  Resp:  [0-38] 32  BP: ()/() 110/67  SpO2:  [90 %-100 %] 100 %      Intake/Output Summary (Last 24 hours) at 5/28/2022 0649  Last data filed at 5/28/2022 0600  Gross per 24 hour   Intake 6550.9 ml   Output 9167 ml   Net -2616.1 ml      ROS  Patient unable to meaningfully communicate at this time.     PHYSICAL EXAM:  Vitals:    05/28/22 0545 05/28/22 0600 05/28/22 0615 05/28/22 0630   BP: 106/68 109/64 118/68 110/67   Pulse: (!) 101 (!) 102 86 71   Resp: (!) 32 (!) 32 (!) 32 (!) 32   Temp:       TempSrc:  Bladder     SpO2: 100% 100% 100% 100%   Weight:       Height:        Body mass index is 29.28 kg/m².    O2 therapy: Pulse Oximetry: 100 %, O2 Delivery Device: Ventilator      Physical Exam  Constitutional:       Appearance: He is obese. He is ill-appearing, toxic-appearing and diaphoretic.   HENT:      Head: Normocephalic.      Nose: Nose normal.      Mouth/Throat:      Mouth: Mucous membranes are dry.   Eyes:      Pupils: Pupils are equal, round, and reactive to light.   Cardiovascular:      Rate and Rhythm: Regular rhythm. Tachycardia present.      Pulses: Normal pulses.      Heart sounds: Murmur heard.     No friction rub. No gallop.   Pulmonary:      Effort: Respiratory distress present.      Breath sounds: Stridor present. No wheezing or rales.      Comments: Retractions and accessory muscle use, on supplemental oxygen delivered via face mask  Abdominal:      General: Abdomen is flat. There is no distension.      Tenderness: There is no abdominal tenderness. There is no guarding.   Genitourinary:     Penis: Normal.    Musculoskeletal:      Right lower leg: No edema.      Left lower leg:  No edema.      Comments: RLE erythema, covered in dressing and pressure stocking wrap   Skin:     General: Skin is warm.      Coloration: Skin is not jaundiced.   Neurological:      Mental Status: He is alert.      Comments: Opens eyes to noise, does not follow commands, no meaningful communication       Recent Labs     05/27/22  0428 05/27/22  1213 05/28/22  0307   ISTATAPH 7.326* 7.265* 7.437   ISTATAPCO2 36.6 63.6* 27.6   ISTATAPO2 127* 292* 110*   ISTATATCO2 20 31 19*   XPHPNIG0WLB 99 100* 99   ISTATARTHCO3 19.1 28.9* 18.6   ISTATARTBE -6* 0 -4   ISTATTEMP 98.2 F 98.6 F 101.3 F   ISTATFIO2 50 30 50   ISTATSPEC Arterial Arterial Arterial   ISTATAPHTC 7.329* 7.265* 7.414   [id number] 126* 292* 120*     Recent Labs     05/27/22 0516 05/27/22  1205 05/27/22 1918 05/28/22  0008 05/28/22  0309   SODIUM 137   < > 135 134* 136   POTASSIUM 5.3   < > 4.0 4.3 4.3   CHLORIDE 103   < > 97 99 100   CO2 15*   < > 19* 15* 16*   BUN 40*   < > 28* 39* 45*   CREATININE 2.99*   < > 2.18* 2.78* 3.05*   MAGNESIUM 1.6  --   --   --  1.7   PHOSPHORUS 4.2  --   --   --  2.8   CALCIUM 7.7*   < > 8.0* 7.5* 7.7*    < > = values in this interval not displayed.     Recent Labs     05/26/22  0359 05/26/22  0917 05/26/22  2040 05/27/22  0131 05/27/22 0516 05/27/22  1205 05/27/22 1918 05/28/22  0008 05/28/22  0309   ALTSGPT 38  --  34  --  32  --   --   --   --    ASTSGOT 42  --  34  --  39  --   --   --   --    ALKPHOSPHAT 51  --  67  --  78  --   --   --   --    TBILIRUBIN 0.7  --  0.8  --  0.8  --   --   --   --    DBILIRUBIN  --   --  0.4  --   --   --   --   --   --    PREALBUMIN  --   --   --  8.6*  --   --   --   --   --    GLUCOSE 96   < > 160*  --  217*   < > 190* 284* 346*    < > = values in this interval not displayed.     Recent Labs     05/26/22  0917 05/27/22  0516 05/28/22  0309   RBC 4.85 4.95 4.94   HEMOGLOBIN 14.5 14.3 14.3   HEMATOCRIT 45.8 44.7 43.3   PLATELETCT 89* 78* 58*   PROTHROMBTM 17.9*  --   --    APTT 37.7*  --    --    INR 1.53*  --   --      Recent Labs     05/26/22  0359 05/26/22  0917 05/26/22 2040 05/27/22  0516 05/28/22  0309   WBC 24.5* 30.4*  --  26.8* 28.9*   NEUTSPOLYS 79.80* 78.30*  --  91.30* 95.60*   LYMPHOCYTES 1.70* 0.90*  --  0.90* 0.00*   MONOCYTES 8.80 6.10  --  6.10 3.50   EOSINOPHILS 0.00 0.00  --  0.00 0.00   BASOPHILS 0.00 0.00  --  0.00 0.00   ASTSGOT 42  --  34 39  --    ALTSGPT 38  --  34 32  --    ALKPHOSPHAT 51  --  67 78  --    TBILIRUBIN 0.7  --  0.8 0.8  --        Meds:  • insulin regular  0-14 Units     • insulin regular 100 units in 100 mL 0.9% NaCl infusion  0-29 Units/hr     • dextrose bolus  12.5-25 g     • artificial tears  1 Application     • heparin  2,800 Units     • propofol  0-80 mcg/kg/min 15 mcg/kg/min (05/28/22 0503)   • acetaminophen  650 mg     • EPINEPHrine (Adrenalin) infusion  0-10 mcg/min Stopped (05/26/22 0815)   • piperacillin-tazobactam  3.375 g 3.375 g (05/28/22 0516)   • heparin  0-30 Units/kg/hr (Adjusted) 20 Units/kg/hr (05/28/22 0443)   • heparin  40 Units/kg (Adjusted)     • insulin regular  3-14 Units      And   • dextrose bolus  25 g     • Pharmacy  1 Each     • Respiratory Therapy Consult       • famotidine  20 mg      Or   • famotidine  20 mg     • senna-docusate  2 Tablet      And   • polyethylene glycol/lytes  1 Packet      And   • magnesium hydroxide  30 mL      And   • bisacodyl  10 mg     • lidocaine  2 mL     • vasopressin (PITRESSIN) infusion  0.03 Units/min Stopped (05/27/22 0600)   • phenylephrine infusion  0-300 mcg/min Stopped (05/27/22 0018)   • furosemide  100 mg     • sodium chloride  2 Spray     • dakins 0.125% (1/4 strength)       • norepinephrine (Levophed) infusion  0-100 mcg/min 46 mcg/min (05/28/22 1441)   • hydrocortisone sodium succinate PF  50 mg     • HYDROmorphone  0.5 mg          Procedures:  None    Imaging:  DX-CHEST-PORTABLE (1 VIEW)   Final Result         1.  Pulmonary edema and/or infiltrates, somewhat increased particularly in  the right lung base compared to prior study.   2.  Cardiomegaly      CT-ABDOMEN-PELVIS W/O   Final Result         Limited noncontrast exam      1. Long segment wall thickening from the descending colon to the sigmoid colon could relate to underdistention or colitis. Air-fluid level in the more proximal colon is likely diarrheal disease.      2. Right lower quadrant transplant kidney with retained prior contrast, likely secondary to renal failure/ATN/contrast nephropathy. No hydronephrosis.      Absent right kidney. Polycystic left kidney.      CT-EXTREMITY, LOWER W/O RIGHT   Final Result      1. Postsurgical changes of right total knee arthroplasty.   2. Right knee joint effusion with thickening of the joint capsule and surrounding soft tissue edema.   3. Circumferential edema involving the soft tissues of the right lower leg with an anterior soft tissue defect and with areas of clustering on the medial right lower leg skin surface.   4. Arteriosclerosis.      DX-CHEST-PORTABLE (1 VIEW)   Final Result      1.  Unchanged position of supporting devices are visualized extent   2.  No visible pneumothorax following chest compressions   3.  Unchanged atelectasis and possible superimposed interstitial pulmonary edema or atypical pneumonia      US-EXTREMITY ARTERY LOWER UNILAT RIGHT   Final Result      US-EXTREMITY ARTERY LOWER BILAT W/MIRELLA (COMBO)   Final Result      DX-CHEST-PORTABLE (1 VIEW)   Final Result         1.  Pulmonary edema and/or infiltrates, somewhat increased particularly in the right lung base compared to prior study.   2.  Cardiomegaly      DX-ABDOMEN FOR TUBE PLACEMENT   Final Result      1. The tip of the feeding tube terminates over the junction of the gastric pylorus and duodenal bulb.   2. The remainder is stable.      DX-CHEST-PORTABLE (1 VIEW)   Final Result      1. Interval decrease in size of the right pleural effusion.   2. No left pleural effusion.   3. No visible pneumothorax status post  bronchoscopy.   4. Interval placement of a Dobbhoff feeding tube.   5. Improving parenchymal loss in the right lung base, incompletely resolved.   6. The remainder is stable.      DX-CHEST-PORTABLE (1 VIEW)   Final Result      1. Interval development of a moderate right pleural effusion after placement of a left internal jugular dialysis catheter, abutting the right lateral wall of the right atrium. Verification of line position with contrast injection under fluoroscopy is    offered.   2. Improved perihilar atelectasis.   3. The remainder is stable.      I, Dr. Matthew Brown, discussed the results of this examination directly by phone with Dr. Reuben Swartz on 5/26/2022 at 0855 hours.      EC-ECHOCARDIOGRAM COMPLETE W/ CONT   Final Result      DX-CHEST-LIMITED (1 VIEW)   Final Result         1. Right internal jugular central venous access catheter placed in the interval terminates over the upper aspect of the right atrium. No postprocedure visible pneumothorax.   2. Stable patchy parenchymal opacities in the lungs, with a stable small left pleural effusion.      DX-CHEST-PORTABLE (1 VIEW)   Final Result         1.  Pulmonary edema and/or infiltrates are identified, which are stable since the prior exam.   2.  Trace bilateral pleural effusions   3.  Cardiomegaly      CT-EXTREMITY, LOWER W/O RIGHT   Final Result      1.  Status post right total knee replacement.      2.  Diffuse atherosclerotic calcification of the arterial system of the right lower extremity suspicious for diabetes mellitus.      3.  Soft tissue attenuation in the subcutaneous tissues of the mid to distal lower leg and foot suspicious for cellulitis.      4.  No evidence of soft tissue ulceration in the right lower leg or foot.      5.  No evidence of acute osteomyelitis in the right lower leg or foot.      6.  Small subcutaneous abscesses cannot be excluded without the use of intravenous contrast.      DX-CHEST-PORTABLE (1 VIEW)   Final  Result      Left internal jugular central venous access catheter terminates over a persistent left superior vena cava. No postprocedure visible pneumothorax.      The remainder is stable.      DX-TIBIA AND FIBULA RIGHT   Final Result      1. Right lower leg soft tissue swelling.   2. No acute fracture or subluxation.   3. Postsurgical changes of right total knee arthroplasty.      US-ABDOMEN F.A.S.T. LTD (FOR ED USE ONLY)   Final Result      No free fluid seen in all 4 quadrants.      Negative FAST scan.            CT-ABDOMEN-PELVIS WITH   Final Result      1. No acute posttraumatic findings in the abdomen or pelvis.   2. Bibasilar subsegmental atelectasis.   3. Right pelvic transplant kidney without hydronephrosis.   4. Polycystic left kidney.   5. Diverticulosis without diverticulitis. Normal appendix.      CT-CTA CHEST PULMONARY ARTERY W/ RECONS   Final Result      1.  No CT evidence of pulmonary emboli.      2.  Bibasilar atelectasis.      3.  No evidence of rib fracture.      4. Diffuse coronary artery calcification.      CT-HEAD W/O   Final Result      1.  Cerebral atrophy.      2.  Otherwise, Head CT without contrast within normal limits. No evidence of acute cerebral infarction, hemorrhage or mass lesion.         CT-CSPINE WITHOUT PLUS RECONS   Final Result      1.  Moderate osteoarthritic changes at the C6-7 level with disc space narrowing and marginal osteophytosis. Further there is moderate cervical spondylotic changes at this level.      2.  No evidence of cervical spine fracture and/or subluxation.      DX-PELVIS-1 OR 2 VIEWS   Final Result      1.  Unremarkable single AP view of the pelvis.      DX-CHEST-LIMITED (1 VIEW)   Final Result      1.  Curvilinear perihilar opacities likely atelectasis and/or parenchymal scarring.      2.  Mild cardiomegaly.      US-MIRELLA SINGLE LEVEL BILAT    (Results Pending)       ASSESSEMENT and PLAN:  Septic shock secondary to bacteremia (present on admission)  Assessment &  Plan  SIRS criteria identified on evaluation include: Tachycardia, with heart rate greater than 90 BPM and Tachypnea, with respirations greater than 20 per minute. Source is likely cellulitis from right lower extremity wound although not definitive (of note is ortho surgery stated also more consistent with cellulitis, not necrotizing fasciitis). Sepsis protocol initiated, fluid resuscitation ordered per protocol. Blood culture x 2 growing E. Coli.   - Infectious disease consulted, following recs; have reached out to ortho for image review and to see if can drain knee  - Zosyn 4.5 g IV Q8H per ID  - Blood culture noting ecoli  - Requires level of care of ICU as needs pressors for hemodynamic support    Acute respiratory failure with hypoxia  Assessment & Plan  -Monitor ventilator waveforms and titrate flow/peep and volumes according.   -Lung protective ventilation strategy w/ A-F bundle  -CXR: monitor lung volumes and tube/line placement  -VAP bundle prevention, oral care, post pyloric feeding  -Head of bed > 30 degree  -GI prophylaxis  -Daily awakening and SBT trials unless contraindicated  -Monitor for liberation  -Respiratory treatments: prn    Acute tubular necrosis (present on admission)  Assessment & Plan  ATN has occurred in the setting of shock, fluid resuscitation has not improved his urine output. Creatinine continues to worsen.   - Nephrology consulted, appreciate the recommendations   - Continue Solucortef for Hx of renal transplant  - Continue to hold Tacrolimus and CellCept in the setting of renal failure (on these in the setting of renal transplant in 2010 for polycystic kidney)    Acute on chronic systolic heart failure (present on admission)  LVEF 35% echo 5/25. Unable to calculate pulmonary artery pressure, indeterminate diastolic function.   - Lasix 100 mg IV BID stopped    Lactic acidosis (present on admission)  Assessment & Plan  Improving lactic acid level.  - CTM labs     Altered Mental Status  (present on admission)  Assessment & Plan  Likely in setting of sepsis. CSF studies culture no growth thus far, RBC count 3000 glucose elevated at 111 protein elevated at 78, viral panel negative, gram stain rare WBCs chest x-ray unremarkable. No acute CT head abnormalities.  - Refer to septis work-up above     Pulmonary edema (present on admission)  Assessment & Plan  Recent x-ray noting pulmonary edema and/or infiltrates, stable since prior, and bilateral pleural effusions.  - Intubation planned this morning as above  - Lasix 100 mg IV BID stopped     Hyperlipidemia (present on admission)  Assessment & Plan  -History of  -Holding home meds     Diabetes mellitus type 2 (present on admission)  Assessment & Plan  -A1c 10.7 on admission.   - Insulin drip, latest gluc 340  - Hypoglycemia protocol     Atrial Fibrillation (present on admission)  Assessment & Plan  - Holding home beta-blocker   - Not on anticoagulant, discuss risk versus benefits when able     Ground-level fall (present on admission)  Assessment & Plan  -No acute trauma on CT imaging. CPK elevated at 684 on admission. Surgery assessed and signed off.

## 2022-05-28 NOTE — PROGRESS NOTES
Discussed insulin dosing instructions with Toby Clinical Pharmacist. 1245 BG was 225. Per pharmacist recommendation, change calculated from previous BG of 246 (after being held for 30 mins). Based on protocol instructions, rate change not required at this time.

## 2022-05-28 NOTE — PROGRESS NOTES
[facility] Progress Note    Hemodialysis treatment ordered today per MONIKA Penaloza x 3 hours. Treatment initiated at 1608 and ended at 1902.  Pt intubated and sedated, on Levophed 50 mcg/min for BP support prior to start of HD.    Levophed adjusted by primary RN due to episodes of hypotension SBP : 60-70's, Pt Afib, HR between 100-140's bpm ( Pt's baseline), NS bolus x 1 given for BP support, UF turned on/off as well as BP tolerates, HD tx ended 6 min early due to SBP :70's, BP improved after blood returned and primary RN increased pressor to 85 mcg/min,Pt not in distress post Tx.; see e-flow sheets for details.    Net UF 1,965 mL    Post tx, CVC flushed with saline then locked with heparin 1000 units/mL per designated amount in each wing then clamped and capped. Aspirate heparin prior to next CVC use.    Report given to Primary RN.

## 2022-05-28 NOTE — CARE PLAN
The patient is Unstable - High likelihood or risk of patient condition declining or worsening    Shift Goals  Clinical Goals: hemodynamic stability, wean pressors  Patient Goals: unable to assess  Family Goals: coping    Progress made toward(s) clinical / shift goals:  family remains engaged in plan of care, able to titrate down slightly on levo. No signs of injury from restraints.    Problem: Knowledge Deficit - Standard  Goal: Patient and family/care givers will demonstrate understanding of plan of care, disease process/condition, diagnostic tests and medications  Outcome: Progressing     Problem: Pain - Standard  Goal: Alleviation of pain or a reduction in pain to the patient’s comfort goal  Outcome: Progressing     Problem: Fall Risk  Goal: Patient will remain free from falls  Outcome: Progressing     Problem: Safety - Medical Restraint  Goal: Remains free of injury from restraints (Restraint for Interference with Medical Device)  Outcome: Progressing       Patient is not progressing towards the following goals:new blistering to rle, back is very red but still blanching. No progress towards d/c restraints.       Problem: Skin Integrity  Goal: Skin integrity is maintained or improved  Outcome: Not Progressing     Problem: Safety - Medical Restraint  Goal: Free from restraint(s) (Restraint for Interference with Medical Device)  Outcome: Not Progressing

## 2022-05-28 NOTE — PROGRESS NOTES
Infectious Disease Progress Note    Author: Vivian Francois M.D. Date & Time of service: 2022  8:45 AM    Chief Complaint:  Follow-up for septic shock, E. coli bacteremia, right lower extremity cellulitis    Interval History:   afebrile, WBC 24.5, blood cultures now growing E. Coli, meningitis CSF PCR panel not detected.  Patient remains on pressors.  Patient was noted to be obtunded with respiratory distress early this morning and has not now been intubated.  Renal function worse today.  Daughter and girlfriend at bedside with questions   T-max 99.7 WBC 26.8, remains on multiple pressors with increasing requirements overnight, remains on the vent however oxygenation requirements improving, renal function improving on CRRT   T-max 101.1 WBC 28.9.  Patient developed PEA arrest and underwent CPR yesterday.  White count worsening.  Remains on pressors.  Chest x-ray worse this morning.  CT of the right lower extremity shows right knee joint effusion and circumferential edema involving the soft tissues of the right lower leg.  CT abdomen and pelvis some wall thickening in the descending colon related to underdistention or colitis.  Remains sedated.  Ortho PA bedside performing right knee joint aspiration-fluid is cloudy yellow    Labs Reviewed, Medications Reviewed and Wound Reviewed.    Review of Systems:  Review of Systems   Unable to perform ROS: Intubated       Hemodynamics:  No data recorded.  Monitored Temp: 38.4 °C (101.1 °F)  Pulse  Av.7  Min: 43  Max: 159   Blood Pressure : 110/67, Arterial BP: 111/61       Physical Exam:  Physical Exam  Vitals and nursing note reviewed.   Constitutional:       Appearance: He is ill-appearing.   HENT:      Mouth/Throat:      Mouth: Mucous membranes are moist.      Comments: ET tube  Eyes:      Comments: Eyes closed   Neck:      Comments: Right IJ catheter    Left IJ HD catheter  Cardiovascular:      Rate and Rhythm: Tachycardia present.   Abdominal:       Palpations: Abdomen is soft.   Musculoskeletal:      Right lower leg: Edema present.      Comments: Right knee surgical scar    Open wound on the anterior aspect of the right lower extremity dressed      Bilateral feet cool to the touch with mottling, right greater than left    Blister formation on right foot and leg    Right knee swelling   Skin:     General: Skin is warm and dry.   Neurological:      Comments: Intubated and sedated   Psychiatric:      Comments: Unable to assess as patient intubated and sedated         Meds:    Current Facility-Administered Medications:   •  insulin regular 100 units in 100 mL 0.9% NaCl infusion  •  dextrose bolus  •  norepinephrine (Levophed) infusion  •  digoxin  •  artificial tears  •  heparin  •  propofol **AND** Triglycerides Starting now and then Every 3 Days  •  acetaminophen  •  piperacillin-tazobactam  •  heparin  •  heparin  •  Pharmacy  •  Respiratory Therapy Consult  •  famotidine **OR** famotidine  •  senna-docusate **AND** polyethylene glycol/lytes **AND** magnesium hydroxide **AND** bisacodyl  •  lidocaine  •  sodium chloride  •  dakins 0.125% (1/4 strength)  •  norepinephrine (Levophed) infusion  •  hydrocortisone sodium succinate PF  •  HYDROmorphone    Labs:  Recent Labs     05/26/22  0359 05/26/22  0917 05/27/22  0516 05/28/22  0309   WBC 24.5* 30.4* 26.8* 28.9*   RBC 4.90 4.85 4.95 4.94   HEMOGLOBIN 14.4 14.5 14.3 14.3   HEMATOCRIT 47.1 45.8 44.7 43.3   MCV 96.1 94.4 90.3 87.7   MCH 29.4 29.9 28.9 28.9   RDW 52.5* 51.3* 49.9 48.7   PLATELETCT 76* 89* 78* 58*   MPV 11.9 11.7 12.7  --    NEUTSPOLYS 79.80* 78.30* 91.30* 95.60*   LYMPHOCYTES 1.70* 0.90* 0.90* 0.00*   MONOCYTES 8.80 6.10 6.10 3.50   EOSINOPHILS 0.00 0.00 0.00 0.00   BASOPHILS 0.00 0.00 0.00 0.00   RBCMORPHOLO Present Present Present Present     Recent Labs     05/27/22  1918 05/28/22  0008 05/28/22  0309   SODIUM 135 134* 136   POTASSIUM 4.0 4.3 4.3   CHLORIDE 97 99 100   CO2 19* 15* 16*   GLUCOSE  190* 284* 346*   BUN 28* 39* 45*     Recent Labs     05/26/22  0359 05/26/22  0917 05/26/22  2040 05/27/22  0516 05/27/22  1205 05/27/22  1918 05/28/22  0008 05/28/22  0309   ALBUMIN 3.0*  --  2.6* 2.5*  --   --   --   --    TBILIRUBIN 0.7  --  0.8 0.8  --   --   --   --    ALKPHOSPHAT 51  --  67 78  --   --   --   --    TOTPROTEIN 5.4*  --  5.4* 5.4*  --   --   --   --    ALTSGPT 38  --  34 32  --   --   --   --    ASTSGOT 42  --  34 39  --   --   --   --    CREATININE 5.22*   < > 4.29* 2.99*   < > 2.18* 2.78* 3.05*    < > = values in this interval not displayed.       Imaging:  CT-CSPINE WITHOUT PLUS RECONS    Result Date: 5/24/2022 5/24/2022 11:38 AM HISTORY/REASON FOR EXAM: Fall and neck pain TECHNIQUE/EXAM DESCRIPTION: CT cervical spine without contrast, with reconstructions. The study was performed on a helical multidetector CT scanner. Thin-section helical scanning was performed from the skull base through T1. Sagittal and coronal multiplanar reconstructions were generated from the axial images. Low dose optimization technique was utilized for this CT exam including automated exposure control and adjustment of the mA and/or kV according to patient size. COMPARISON:  None. FINDINGS: Alignment in the cervical spine is normal. There is no fracture or dislocation. The craniovertebral junction appears intact. The prevertebral and paraspinous soft tissues are unremarkable. There is moderate disc space. C6-7 level with marginal osteophytosis and moderate posterior spurring. The superior mediastinum and lung apices in the field of view are unremarkable.     1.  Moderate osteoarthritic changes at the C6-7 level with disc space narrowing and marginal osteophytosis. Further there is moderate cervical spondylotic changes at this level. 2.  No evidence of cervical spine fracture and/or subluxation.    CT-ABDOMEN-PELVIS WITH    Result Date: 5/24/2022 5/24/2022 11:39 AM HISTORY/REASON FOR EXAM:  trauma red. TECHNIQUE/EXAM  DESCRIPTION:   CT scan of the abdomen and pelvis with contrast. Contrast-enhanced helical scanning was obtained from the diaphragmatic domes through the pubic symphysis following the bolus administration of nonionic contrast without complication. 75 mL of Omnipaque 350 nonionic contrast was administered without complication. Low dose optimization technique was utilized for this CT exam including automated exposure control and adjustment of the mA and/or kV according to patient size. COMPARISON: Complete abdominal sonogram, 9/14/2021. FINDINGS: Lower Chest: Dependent atelectasis in the lung bases. No pleural effusion or pneumothorax. No pericardial effusion. Liver: Normal. Spleen: Unremarkable. Pancreas: Unremarkable. Gallbladder: No calcified stones. Biliary: Nondilated. Adrenal glands: Normal. Kidneys: Absent right kidney. Right lower quadrant transplant kidney without hydronephrosis. Enlarged left kidney with 2 numerous to count fluid attenuation lesions throughout the cortex. Nonobstructing stones in the lower pole of the left kidney. No hydronephrosis or hydroureter. Bowel: Sigmoid colon diverticulosis, without diverticulitis. Normal appendix. Lymph nodes: No adenopathy. Vasculature: Calcified atherosclerotic plaques in the aorta and iliac arteries, without aneurysmal dilation. Peritoneum: Unremarkable without ascites. Musculoskeletal: No acute or destructive process. Multilevel lumbar spondylosis. Pelvis: The bladder is decompressed by an indwelling Epps catheter.. Small umbilical hernia containing fat.     1. No acute posttraumatic findings in the abdomen or pelvis. 2. Bibasilar subsegmental atelectasis. 3. Right pelvic transplant kidney without hydronephrosis. 4. Polycystic left kidney. 5. Diverticulosis without diverticulitis. Normal appendix.    CT-EXTREMITY, LOWER W/O RIGHT    Result Date: 5/24/2022 5/24/2022 4:17 PM HISTORY/REASON FOR EXAM:  Soft tissue infection suspected, lower leg, no prior imaging;  necrotizing fasciitis to right lower ext, recieved large amounts of contrast already with renal transplant. TECHNIQUE/EXAM DESCRIPTION AND NUMBER OF VIEWS: CT scan of the RIGHT lower extremity without contrast and including reconstructions. Thin-section noncontrast helical images were obtained. Coronal and sagittal reconstructions were generated from the axial images. Up to date radiation dose reduction adjustments have been utilized to meet ALARA standards for radiation dose reduction. COMPARISON: None. FINDINGS: Right total knee replacement. There is decreased bony mineralization of the right lower extremity. There is no evidence of ulceration or soft tissue mass in the right lower extremity. There is no evidence of large abscess formation. There is diffuse atherosclerotic calcification of the right superficial femoral artery. There are curvilinear regions of soft tissue attenuation throughout the subcutaneous tissues of the right mid and distal lower leg.     1.  Status post right total knee replacement. 2.  Diffuse atherosclerotic calcification of the arterial system of the right lower extremity suspicious for diabetes mellitus. 3.  Soft tissue attenuation in the subcutaneous tissues of the mid to distal lower leg and foot suspicious for cellulitis. 4.  No evidence of soft tissue ulceration in the right lower leg or foot. 5.  No evidence of acute osteomyelitis in the right lower leg or foot. 6.  Small subcutaneous abscesses cannot be excluded without the use of intravenous contrast.    CT-HEAD W/O    Result Date: 5/24/2022 5/24/2022 11:38 AM HISTORY/REASON FOR EXAM:  trauma red. TECHNIQUE/EXAM DESCRIPTION AND NUMBER OF VIEWS: CT of the head without contrast. The study was performed on a helical multidetector CT scanner. Contiguous axial sections were obtained from the skull base through the vertex. Up to date radiation dose reduction adjustments have been utilized to meet ALARA standards for radiation dose  reduction. COMPARISON:  None available FINDINGS: The calvariae and skull base are unremarkable. There are no extraaxial fluid collections. There is a pattern of cerebral atrophy manifest as enlargement of sulcal markings and ventricular prominence. The ventricular system and basal cisterns are otherwise unremarkable. There are no areas of abnormal density in the brain substance. There are no hemorrhagic lesions. There are no mass effects or shift of midline structures. The brainstem and posterior fossa structures are unremarkable. Paranasal sinuses in the field of view are unremarkable. Mastoids in the field of view are unremarkable.     1.  Cerebral atrophy. 2.  Otherwise, Head CT without contrast within normal limits. No evidence of acute cerebral infarction, hemorrhage or mass lesion.     DX-CHEST-LIMITED (1 VIEW)    Result Date: 5/25/2022 5/25/2022 7:52 AM HISTORY/REASON FOR EXAM:  Central line placement TECHNIQUE/EXAM DESCRIPTION AND NUMBER OF VIEWS: Single portable view of the chest. COMPARISON: Chest radiography, 5/25/2022 at 0719 hours FINDINGS: Lungs: Symmetric low lung volumes. Stable linear and patchy opacities are unchanged. Stable small left pleural effusion. The right costophrenic recess is sharp. No visible pneumothorax bilaterally. Mediastinum: Cardiac silhouette size remains enlarged. Other: The right internal jugular central venous access catheter placed in the interval terminates over the right atrium. The remaining visualized bones and soft tissues are stable radiographically.     1. Right internal jugular central venous access catheter placed in the interval terminates over the upper aspect of the right atrium. No postprocedure visible pneumothorax. 2. Stable patchy parenchymal opacities in the lungs, with a stable small left pleural effusion.    DX-CHEST-LIMITED (1 VIEW)    Result Date: 5/24/2022 5/24/2022 11:26 AM HISTORY/REASON FOR EXAM:  Chest Pain. TECHNIQUE/EXAM DESCRIPTION AND NUMBER OF  VIEWS: Single AP view of the chest. COMPARISON:  Chest x-ray 11/13/2020 FINDINGS: Lungs:  There are curvilinear opacities in the lung fields bilaterally most pronounced in the perihilar regions. Pleura:  There is no pleural effusion or pneumothorax. Heart and mediastinum:  The heart silhouette is mildly enlarged Bones:  Normal     1.  Curvilinear perihilar opacities likely atelectasis and/or parenchymal scarring. 2.  Mild cardiomegaly.    DX-CHEST-PORTABLE (1 VIEW)    Result Date: 5/25/2022 5/25/2022 7:12 AM HISTORY/REASON FOR EXAM: Shortness of Breath TECHNIQUE/EXAM DESCRIPTION:  Single AP view of the chest. COMPARISON: Yesterday FINDINGS: Cardiomegaly is observed. The mediastinal contour appears within normal limits.  The central  pulmonary vasculature appears prominent and indistinct. Bilateral lung volumes are diminished.  Diffuse scattered hazy pulmonary parenchymal opacities are seen. Blunting of bilateral costophrenic angles is seen, compatible with trace bilateral pleural effusions. The bony structures appear age-appropriate.     1.  Pulmonary edema and/or infiltrates are identified, which are stable since the prior exam. 2.  Trace bilateral pleural effusions 3.  Cardiomegaly    DX-CHEST-PORTABLE (1 VIEW)    Result Date: 5/24/2022 5/24/2022 1:00 PM HISTORY/REASON FOR EXAM:  Central line placement. TECHNIQUE/EXAM DESCRIPTION AND NUMBER OF VIEWS: Single portable view of the chest. COMPARISON: Chest radiograph, 5/24/2022 at 1147 hours FINDINGS: Lungs: Stable curvilinear opacities in the lung fields, greatest in the perihilar regions, stable when compared to prior study. Stable bibasal parenchymal volume loss. No large volume pleural effusion or visible pneumothorax. No pulmonary vascular congestion or interstitial edema. Mediastinum: The cardiac silhouette size remains enlarged. Other: The left internal jugular central venous access catheter placed in the interval terminates over a persistent left superior  vena cava. The remaining visualized bones and soft tissues are stable radiographically.     Left internal jugular central venous access catheter terminates over a persistent left superior vena cava. No postprocedure visible pneumothorax. The remainder is stable.    DX-PELVIS-1 OR 2 VIEWS    Result Date: 5/24/2022 5/24/2022 11:27 AM HISTORY/REASON FOR EXAM:  Pelvic/Hip Pain Following Trauma. TECHNIQUE/EXAM DESCRIPTION AND NUMBER OF VIEWS:  1 view(s) of the pelvis. COMPARISON:  None. FINDINGS: There is normal bony mineralization of the pelvis. There is no evidence of pelvic fracture or osseous lesion. The sacroiliac joints and pubic symphysis are symmetrical.     1.  Unremarkable single AP view of the pelvis.    DX-TIBIA AND FIBULA RIGHT    Result Date: 5/24/2022 5/24/2022 12:17 PM HISTORY/REASON FOR EXAM:  Pain/Deformity Following Trauma. TECHNIQUE/EXAM DESCRIPTION AND NUMBER OF VIEWS:  2 views of the RIGHT tibia and fibula. COMPARISON: Right knee radiography, 12/18/2006 FINDINGS: Bones: Postsurgical changes in the distal right femur and proximal right tibia. Normal bone mineralization. No focal bone lesion. No acute fracture. Joint: Postsurgical changes of right total knee arthroplasty. Ankle mortise is preserved. No subluxation. Soft tissue: Arteriosclerosis. Circumferential right lower leg soft tissue swelling.     1. Right lower leg soft tissue swelling. 2. No acute fracture or subluxation. 3. Postsurgical changes of right total knee arthroplasty.    CT-CTA CHEST PULMONARY ARTERY W/ RECONS    Result Date: 5/24/2022 5/24/2022 11:38 AM HISTORY/REASON FOR EXAM:  Fall and chest pain TECHNIQUE/EXAM DESCRIPTION: CT angiogram scan for pulmonary embolism with contrast, with reconstructions. 1.25 mm helical sections were obtained from the lung apices through the lung bases following the rapid bolus administration of 75 mL of Omnipaque 350 nonionic contrast. Thin-section overlapping reconstruction interval was utilized.  Coronal reconstructions were generated from the axial data. MIP post processing was performed and utilized for the interpretation. Low dose optimization technique was utilized for this CT exam including automated exposure control and adjustment of the mA and/or kV according to patient size. COMPARISON: None FINDINGS: Pulmonary Embolism: No. Main Pulmonary Arteries: No. Segmental branches: No. Subsegmental branches: No. Additional Comments: None. Lungs: There are curvilinear opacities dependently in the lung bases. Pleura: No pleural effusion. Nodes: No enlarged lymph nodes. Additional findings: Diffuse coronary artery calcification.     1.  No CT evidence of pulmonary emboli. 2.  Bibasilar atelectasis. 3.  No evidence of rib fracture. 4. Diffuse coronary artery calcification.    EC-ECHOCARDIOGRAM COMPLETE W/ CONT    Result Date: 2022  Transthoracic Echo Report Echocardiography Laboratory CONCLUSIONS No prior study is available for comparison. The left ventricular ejection fraction is visually estimated to be 35%. Unable to estimate pulmonary artery pressure due to an inadequate tricuspid regurgitant jet. CIARA FORRESTER Exam Date:         2022                    11:35 Exam Location:     Inpatient Priority:          Routine Ordering Physician:        ALEJANDRA FROST Referring Physician: Sonographer:               Jack Lockwood                            [facility], T Age:    66     Gender:    M MRN:    [id number] :    1955 BSA:    2.4    Ht (in):    77     Wt (lb):    235 Exam Type:     Complete Indications:     Shortness of breath ICD Codes:       786.05 CPT Codes:       60531 BP:   90     /   51     HR:   97 Technical Quality:       Technically difficult study -                          adequate information is obtained MEASUREMENTS  (Male / Female) Normal Values 2D ECHO LV Diastolic Diameter PLAX        4.6 cm                4.2 - 5.9 / 3.9 - 5.3 cm LV Systolic Diameter PLAX         4.1 cm                 2.1 - 4.0 cm IVS Diastolic Thickness           1.2 cm                LVPW Diastolic Thickness          1.2 cm                LVOT Diameter                     2 cm                  Estimated LV Ejection Fraction    35 %                  LV Ejection Fraction MOD BP       38.5 %                >= 55  % LV Ejection Fraction MOD 4C       52.3 %                LV Ejection Fraction MOD 2C       40.7 %                IVC Diameter                      1.8 cm                DOPPLER AV Peak Velocity                  1.5 m/s               AV Peak Gradient                  9.5 mmHg              AV Mean Gradient                  5.7 mmHg              LVOT Peak Velocity                0.8 m/s               AV Area Cont Eq vti               1.6 cm2               MV Velocity Time Integral         13.5 cm               Mitral E Point Velocity           0.63 m/s              Mitral E to A Ratio               1.1                   MV Pressure Half Time             62.8 ms               MV Area PHT                       3.5 cm2               MV Deceleration Time              217 ms                TR Peak Velocity                  275 cm/s              PV Peak Velocity                  1.1 m/s               PV Peak Gradient                  4.6 mmHg              RVOT Peak Velocity                0.79 m/s              * Indicates values subject to auto-interpretation LV EF:  35    % FINDINGS Left Ventricle 3 ml of  contrast was used to evaluate for thrombus in the left ventricular apex. Normal left ventricular chamber size. Mild concentric left ventricular hypertrophy. Moderately reduced left ventricular systolic function. The left ventricular ejection fraction is visually estimated to be 35%.There is  regional wall motion abnormalities may be from old MI.indeterminate diastolic function. Right Ventricle Normal right ventricular size. Reduced right ventricular systolic function. Right Atrium Enlarged right atrium. Normal inferior  "vena cava size and inspiratory collapse. Left Atrium Normal left atrial size. Left atrial volume index is 21  mL/sq m. Mitral Valve The mitral valve is not well visualized. No mitral stenosis. Trace mitral regurgitation. Aortic Valve The aortic valve is not well visualized. No aortic valve stenosis. No aortic insufficiency. Tricuspid Valve The tricuspid valve is not well visualized. No tricuspid stenosis. Trace tricuspid regurgitation. Unable to estimate pulmonary artery pressure due to an inadequate tricuspid regurgitant jet. Pulmonic Valve The pulmonic valve is not well visualized. No pulmonic stenosis. Trace pulmonic insufficiency. Pericardium Normal pericardium without effusion. Aorta The ascending aorta diameter is 3.2  cm. Matthew Yoder MD (Electronically Signed) Final Date:     25 May 2022 14:37    [facility] [employer] (FOR ED USE ONLY)    Result Date: 5/24/2022 5/24/2022 12:01 PM HISTORY/REASON FOR EXAM:  Ground-level fall with traumatic injury TECHNIQUE/EXAM DESCRIPTION AND NUMBER OF VIEWS:  Limited ultrasound of the abdomen. FAST scan. Focused ultrasound of the 4 quadrants of the abdomen. COMPARISON: None FINDINGS: Focused ultrasound of the 4 quadrants of the abdomen demonstrates no evidence of free fluid in the 4 quadrants.     No free fluid seen in all 4 quadrants. Negative FAST scan.       Micro:  Results     Procedure Component Value Units Date/Time    BLOOD CULTURE [[id number]] Collected: 05/28/22 0800    Order Status: Sent Specimen: Blood from Peripheral Updated: 05/28/22 0820    Narrative:      Per Hospital Policy: Only change Specimen Src: to \"Line\" if  specified by physician order.    BLOOD CULTURE [[id number]]     Order Status: Sent Specimen: Blood from Peripheral     QUANT BRONCHIAL WASHING [[id number]] Collected: 05/26/22 0944    Order Status: Completed Specimen: Respirate Updated: 05/27/22 1439     Significant Indicator NEG     Source RESP     Site BRONCHOALVEOLAR LAVAGE     Culture " "Result 4,200 cfu/mL usual upper respiratory shaun     Gram Stain Result Moderate WBCs.  Few Gram positive cocci.  <5% intracellular organisms.      Narrative:      Collected By: [id number] SANTOSH BUENO.  Collected By: [id number] SANTOSH BUENO.    CSF CULTURE [[id number]] Collected: 05/24/22 1325    Order Status: Completed Specimen: CSF from Tap Updated: 05/27/22 0946     Significant Indicator NEG     Source CSF     Site TAP     Culture Result No growth at 72 hours.     Gram Stain Result Rare WBCs.  No organisms seen.      GRAM STAIN [[id number]] Collected: 05/26/22 0944    Order Status: Completed Specimen: Respirate Updated: 05/26/22 1847     Significant Indicator .     Source RESP     Site BRONCHOALVEOLAR LAVAGE     Gram Stain Result Moderate WBCs.  Few Gram positive cocci.  <5% intracellular organisms.      Narrative:      Collected By: [id number] SANTOSH BUENO.  Collected By: [id number] SANTOSH BUENO.    Culture Respiratory W/ Grm Stn - BAL [[id number]]     Order Status: Completed Specimen: Respirate from Bronchoalveolar Lavage     BLOOD CULTURE x2 [[id number]]  (Abnormal) Collected: 05/24/22 1122    Order Status: Completed Specimen: Blood from Peripheral Updated: 05/26/22 0952     Significant Indicator POS     Source BLD     Site PERIPHERAL     Culture Result Growth detected by Bactec instrument. 05/24/2022  23:30      Escherichia coli  See previous culture for sensitivity report.      Narrative:      CALL  [place]ett  19 tel. [phone],  CALLED  19 tel. [phone] 05/24/2022, 23:31, RB PERF. RESULTS CALLED TO: RN  [id number]  Per Hospital Policy: Only change Specimen Src: to \"Line\" if  specified by physician order.  Left Hand    BLOOD CULTURE x2 [[id number]]  (Abnormal)  (Susceptibility) Collected: 05/24/22 1124    Order Status: Completed Specimen: Blood from Peripheral Updated: 05/26/22 0949     Significant Indicator POS     Source BLD     Site PERIPHERAL     Culture Result Growth detected by Bactec instrument. 05/24/2022  23:30    " "  Escherichia coli    Narrative:      CALL  Crockett  19 tel. [phone],  CALLED  19 tel. [phone] 05/24/2022, 23:31, RB PERF. RESULTS CALLED TO:  [id number]  Per Hospital Policy: Only change Specimen Src: to \"Line\" if  specified by physician order.  Right Hand    Susceptibility     Escherichia coli (1)     Antibiotic Interpretation Microscan   Method Status    Ampicillin Resistant >16 mcg/mL CESIA Final    Ceftriaxone Sensitive <=1 mcg/mL CESIA Final    Cefazolin Sensitive <=2 mcg/mL CESIA Final    Ciprofloxacin Sensitive <=0.25 mcg/mL CESIA Final    Cefepime Sensitive <=2 mcg/mL CESIA Final    Cefuroxime Sensitive <=4 mcg/mL CESIA Final    Ampicillin/sulbactam Intermediate 16/8 mcg/mL CESIA Final    Ertapenem Sensitive <=0.5 mcg/mL CESIA Final    Tobramycin Sensitive <=2 mcg/mL CESIA Final    Gentamicin Sensitive <=2 mcg/mL CESIA Final    Minocycline Sensitive <=4 mcg/mL CESIA Final    Moxifloxacin Sensitive <=2 mcg/mL CESIA Final    Pip/Tazobactam Sensitive <=8 mcg/mL CESIA Final    Trimeth/Sulfa Resistant >2/38 mcg/mL CESIA Final    Tigecycline Sensitive <=2 mcg/mL CESIA Final                   RESP CULTURE [[id number]] Collected: 05/26/22 0944    Order Status: Canceled Specimen: Other from Bronchoalveolar Lavage     GRAM STAIN [[id number]] Collected: 05/24/22 1325    Order Status: Completed Specimen: CSF Updated: 05/24/22 1545     Significant Indicator .     Source CSF     Site TAP     Gram Stain Result Rare WBCs.  No organisms seen.      CULTURE WOUND W/ GRAM STAIN [[id number]]     Order Status: Canceled Specimen: Wound from Right Leg     URINALYSIS,CULTURE IF INDICATED [[id number]]  (Abnormal) Collected: 05/24/22 1245    Order Status: Sent Specimen: Urine, Cath Updated: 05/24/22 1323     Color Yellow     Character Clear     Specific Gravity 1.017     Ph 5.0     Glucose 500 mg/dL      Ketones Negative mg/dL      Protein Negative mg/dL      Bilirubin Negative     Urobilinogen, Urine 0.2     Nitrite Negative     Leukocyte Esterase Negative     " "Occult Blood Negative     Micro Urine Req see below     Comment: Microscopic examination not performed when specimen is clear  and chemically negative for protein, blood, leukocyte esterase  and nitrite.         Narrative:      Indication for culture:->Acute unexplained altered mental  status ONLY after ruling out other recognized cause    COV-2, FLU A/B, AND RSV BY PCR (2-4 HOURS CEPHEID): Collect NP swab in VTM [[id number]] Collected: 05/24/22 1130    Order Status: Completed Specimen: Respirate Updated: 05/24/22 1243     Influenza virus A RNA Negative     Influenza virus B, PCR Negative     RSV, PCR Negative     SARS-CoV-2 by PCR NotDetected     Comment: PATIENTS: Important information regarding your results and instructions can  be found at [email or web address]   \"After your  Covid-19 Test\"    [facility] providers: PLEASE REFER TO DE-ESCALATION AND RETESTING PROTOCOL  on [email or web address]    **The [facility] GeneXpert Xpress SARS-CoV-2 RT-PCR Test has been made  available for use under the Emergency Use Authorization (EUA) only.          SARS-CoV-2 Source NP Swab    BLOOD CULTURE x2 [[id number]] Collected: 05/24/22 0000    Order Status: Canceled Specimen: Other from Peripheral           Assessment:  66 y.o. man with a history of uncontrolled type 2 diabetes mellitus, history of prior renal transplant on chronic immunosuppression, stage III chronic kidney disease, atrial fibrillation on anticoagulation, and prior right total knee arthroplasty admitted on 5/24/2022 secondary to altered mentation in the setting of a ground-level fall and hit his right shin.  Patient found to be in septic shock with gram-negative bacteremia and right lower extremity cellulitis without any evidence of necrotizing fasciitis.  His right lower extremity cellulitis appears to be improving.    On the morning of 5/26 was noted to be obtunded with respiratory distress so was intubated.    Pertinent diagnoses:  Right knee " joint effusion  Persistent fevers  Septic shock, remains on pressors  Ventilatory dependent respiratory failure, intubated on 5/26  E. coli bacteremia, source possibly below  Right lower extremity cellulitis  Acute kidney injury on stage III chronic kidney disease, now on CRRT  Rhabdomyolysis  Leukocytosis, worse  Thrombocytopenia, worse  Acute encephalopathy  Uncontrolled type 2 diabetes mellitus, hemoglobin A1c 10.7  History of right total knee arthroplasty  History of renal transplant on tacrolimus, mycophenolate and prednisone  Recent fall    Plan:  -Discontinue IV Zosyn  -Transition to IV meropenem given new persistent fevers and worsening chest x-ray  -Follow blood cultures on 5/24-E coli, resistant to ampicillin, intermediate resistance to Unasyn  -Diabetes education and blood sugar control  -Avoid nephrotoxins  -CSF cultures-negative  - BAL culture on 5/26-upper respiratory shaun  -Follow repeat blood cultures on 5/28-pending  -Continue supportive care by primary team  -Nephrology following- now on dialysis  -CT abdomen and pelvis- wall thickening from descending colon could relate to underdistention versus colitis  -Checks x-ray from today-worse  -Ultrasound of the right lower extremity with no stenosis or occlusion  -CT of the right lower extremity with right knee joint effusion  -Status post bedside right knee joint aspiration today- yellow cloudy fluid.  Follow cell count and cultures     Prognosis guarded    I have performed a physical exam and reviewed and updated ROS and plan today 5/28/2022.  In review of yesterday's note 5/27/2022, there are no changes except as documented above.      Plan of care discussed with intensivist, Dr. Swartz and Bo WADSWORTH

## 2022-05-28 NOTE — PROGRESS NOTES
Patient seen by Dr. Baker 5/24 for evaluation of right leg cellulitis  Patient still septic, WBC 28  CT shows no fluid collection 5/27  There are ruptured blisters anteromedial mid-distal leg  No surgical indications for right leg  Will aspirate right TKA to make sure no PJI

## 2022-05-28 NOTE — PROGRESS NOTES
"Critical Care Medicine Faculty Progress Note    HPI: 66y M Hx of renal transplant 9/10/2010 for end stage polycystic kidney dz maintain on prednisone, tacrolimus and mcyophenolate, DM, chronic thrombocytopenia, CKD 3B, Pafib, bronchitis, Covid 19 11/2020. That on Sunday hit his shin. He since has been complaining of pain. This morning he called his younger brother. His brother went to his house found him altered. He was brought in by EMS as a trauma activation since he had AMS and bruising from his fall. CT head negative, CTA chest negative, CT abdomen negative, CT spine negative other then degenerative findings. He was in severe septic shock, started on pressors and fluid bolus. He was given broad spectrum microbials. His only compliant is his right leg. LP is currently pending and was traumatic. He would like to be full code.     Exam:Ill appearing on ventilator and multiple support devices, bruising to left forehead and scratch to right frontal area, lungs mechanical coarse, heart irregular without murmurs, abdomen soft nttp, ext with edema, cold hands and bilateral feet but warm knees and rest of extremities. Right lower shin with bruising and small old upside down \"U\" cut scabbed no fluctuance, no blistering, no groin adenopathy, Neuro he is sedated on ventilator. Skin with areas of bruising knees and toes    Rounding Report:  Neuro: pupils reactive trace movement, dilaudid  HR: afib 's  SBP: levophed 46 mcg  Tmax: 101.5  GI: TF at goal, BM 5/26  UOP: anuric for 2 days  Lines: central line, artline, dialysis line, fernandez  Resp: Vent day 3 RR 32 peep 8 30% no secretions   Vte: heparin  PPI/H2:pepcid  Antibx: Zosyn 5/10 ID E Coli bacteremia -> ID changed to meropenem      A/P:  Insulin gtt, serial BMP  Drop RR to 30  Blood Cx x2  CMP this afternoon  Digoxin 500mcg IV x1  SAT  Concentrate all gtts  D/c fernandez and lasix, bladder scan Q shift  ID changed to meropenem  Orthopedic repeat consult for drain right " knee      Severe septic shock likely related to severe soft tissue infection: Initially Concerns for Toxic shock syndrome with his erythroderma  Empiric rx clinda + zosyn + linezolid transitioned to Zosyn guided by ID with his E Coli bacteremia. Monitor need for surgical debridement appreciate consultation: pain disproportionate to exam, pain/edema beyond erythema, anesthesia, crepitus, blistering/bullae, hyponatremia, rapid progression, gas on imaging or facial edema or enhancement (avoid using LRINEC score poor sensitivity)  Consider Bx of tissue to rule out fasciitis/myositis or cut down looking for dishwater color fluid  Fluid therapy with resus and norepinephrine for map > 65    Septic Shock: s/p fluid resus, monitor end organ, lactate continue with norepinephrine    E Coli Bacteremia: Blood 2/2 5/24 follow up speciation continue broad spectrum antibiotics until speciation and potential polymycorbial, ID consulting, intermediate resistance pattern.     Chronic immunosuppression: hold with severe life threatening sepsis   Stress dose steroids for relative adrenal insufficiency. Tacrolimius level 5.4 3/2022 follow repeat level on admission.     Encephalopathy: improved likely hypoperfusion and septic. Serial neuro exam. LP negative for meningitis    Thrombocytopenia: chronic serial monitor monitor for bleeding, trend    SANKET on CKD: Hx of CKD s/p renal transplant, acute renal injury related to septic shock and ischemic atn s/p contrast die load, avoid nephrotoxins, check CPK, nephrology consultation, avoid hyperchloremic resus fluid. Started on CRRT 5/26. Lasix challenge when appropriate to monitor for renal recovery. Daily iHD, concentrate all gtts    Lactic acidosis: due to sepsis serial monitor, also poor clearence with renal injury.     DM: aggressive glucose control for better source of infection consider insulin gtt. HgA1C 10.7. Poorly controlled start insulin gtt 5/28    Volume overload: Net + 13L judicious  fluid management.  CRRT for volume removal.     Hyperkalemia: S/p hyper K cocktail 5/26, Started on CRRT 5/26, daily labs, daily iHD.      Cardiomyopathy: likely septic vs ischemic and old AMI in nature wean off vasopressin and use norepinephrine for ionopressor, EF 35% with LVH    Paroxysmal Atrial fibrillation: hx of continue to optimize filling pressure and electrolytes, he is rate controlled, start anticoagulation. Patient worsens with epinephrine gtt. Trial of IV digoxin 5/28    Acute hypoxic hypercarbic respiratory failure: intubated 5/26 for airway protection and respiratory failure, A-F bundle, daily SAT/SBT, aspiration precautions.     Lower extremity ischemia: follow up on duplex u/s likely related to vasopressors, wean off vasopressin map goal 65 strict, warm extremities with blankets, serial monitor.     PEA Cardiac Arrest 5/27: patient while on pressor and dialysis had a brief PEA arrest 1 epi and ROSC was achieved likely from aggressive dialysis removal with LVH and diastolic, ekg, pocus, CXR all was unremarkable on heparin.     Patient remains in critical condition from septic shock titration of norepinephrine, ventilator, iHD and close monitoring. Critical care time provided was 77 minutes. This excludes all separate billable procedures.       Reuben Swartz MD  Critical Care Medicine

## 2022-05-29 ENCOUNTER — APPOINTMENT (OUTPATIENT)
Dept: RADIOLOGY | Facility: MEDICAL CENTER | Age: 67
DRG: 870 | End: 2022-05-29
Attending: INTERNAL MEDICINE
Payer: MEDICARE

## 2022-05-29 LAB
ANION GAP SERPL CALC-SCNC: 13 MMOL/L (ref 7–16)
ANION GAP SERPL CALC-SCNC: 14 MMOL/L (ref 7–16)
ANION GAP SERPL CALC-SCNC: 14 MMOL/L (ref 7–16)
BASE EXCESS BLDA CALC-SCNC: 4 MMOL/L (ref -4–3)
BASOPHILS # BLD AUTO: 0 % (ref 0–1.8)
BASOPHILS # BLD: 0 K/UL (ref 0–0.12)
BODY TEMPERATURE: ABNORMAL DEGREES
BREATHS SETTING VENT: 30
BUN SERPL-MCNC: 45 MG/DL (ref 8–22)
BUN SERPL-MCNC: 57 MG/DL (ref 8–22)
BUN SERPL-MCNC: 70 MG/DL (ref 8–22)
BURR CELLS BLD QL SMEAR: NORMAL
CALCIUM SERPL-MCNC: 7.7 MG/DL (ref 8.5–10.5)
CALCIUM SERPL-MCNC: 7.8 MG/DL (ref 8.5–10.5)
CALCIUM SERPL-MCNC: 8.1 MG/DL (ref 8.5–10.5)
CHLORIDE SERPL-SCNC: 100 MMOL/L (ref 96–112)
CHLORIDE SERPL-SCNC: 98 MMOL/L (ref 96–112)
CHLORIDE SERPL-SCNC: 99 MMOL/L (ref 96–112)
CO2 BLDA-SCNC: 26 MMOL/L (ref 20–33)
CO2 SERPL-SCNC: 21 MMOL/L (ref 20–33)
CO2 SERPL-SCNC: 22 MMOL/L (ref 20–33)
CO2 SERPL-SCNC: 25 MMOL/L (ref 20–33)
CREAT SERPL-MCNC: 2.72 MG/DL (ref 0.5–1.4)
CREAT SERPL-MCNC: 3.31 MG/DL (ref 0.5–1.4)
CREAT SERPL-MCNC: 3.78 MG/DL (ref 0.5–1.4)
DELSYS IDSYS: ABNORMAL
DIGOXIN SERPL-MCNC: 0.7 NG/ML (ref 0.8–2)
END TIDAL CARBON DIOXIDE IECO2: 26 MMHG
EOSINOPHIL # BLD AUTO: 0 K/UL (ref 0–0.51)
EOSINOPHIL NFR BLD: 0 % (ref 0–6.9)
ERYTHROCYTE [DISTWIDTH] IN BLOOD BY AUTOMATED COUNT: 47.5 FL (ref 35.9–50)
GFR SERPLBLD CREATININE-BSD FMLA CKD-EPI: 17 ML/MIN/1.73 M 2
GFR SERPLBLD CREATININE-BSD FMLA CKD-EPI: 20 ML/MIN/1.73 M 2
GFR SERPLBLD CREATININE-BSD FMLA CKD-EPI: 25 ML/MIN/1.73 M 2
GLUCOSE BLD STRIP.AUTO-MCNC: 124 MG/DL (ref 65–99)
GLUCOSE BLD STRIP.AUTO-MCNC: 142 MG/DL (ref 65–99)
GLUCOSE BLD STRIP.AUTO-MCNC: 143 MG/DL (ref 65–99)
GLUCOSE BLD STRIP.AUTO-MCNC: 150 MG/DL (ref 65–99)
GLUCOSE BLD STRIP.AUTO-MCNC: 153 MG/DL (ref 65–99)
GLUCOSE BLD STRIP.AUTO-MCNC: 156 MG/DL (ref 65–99)
GLUCOSE BLD STRIP.AUTO-MCNC: 157 MG/DL (ref 65–99)
GLUCOSE BLD STRIP.AUTO-MCNC: 168 MG/DL (ref 65–99)
GLUCOSE BLD STRIP.AUTO-MCNC: 169 MG/DL (ref 65–99)
GLUCOSE BLD STRIP.AUTO-MCNC: 176 MG/DL (ref 65–99)
GLUCOSE BLD STRIP.AUTO-MCNC: 181 MG/DL (ref 65–99)
GLUCOSE BLD STRIP.AUTO-MCNC: 186 MG/DL (ref 65–99)
GLUCOSE BLD STRIP.AUTO-MCNC: 188 MG/DL (ref 65–99)
GLUCOSE BLD STRIP.AUTO-MCNC: 192 MG/DL (ref 65–99)
GLUCOSE BLD STRIP.AUTO-MCNC: 196 MG/DL (ref 65–99)
GLUCOSE BLD STRIP.AUTO-MCNC: 211 MG/DL (ref 65–99)
GLUCOSE BLD STRIP.AUTO-MCNC: 222 MG/DL (ref 65–99)
GLUCOSE BLD STRIP.AUTO-MCNC: 224 MG/DL (ref 65–99)
GLUCOSE BLD STRIP.AUTO-MCNC: 229 MG/DL (ref 65–99)
GLUCOSE BLD STRIP.AUTO-MCNC: 231 MG/DL (ref 65–99)
GLUCOSE BLD STRIP.AUTO-MCNC: 233 MG/DL (ref 65–99)
GLUCOSE BLD STRIP.AUTO-MCNC: 234 MG/DL (ref 65–99)
GLUCOSE BLD STRIP.AUTO-MCNC: 237 MG/DL (ref 65–99)
GLUCOSE BLD STRIP.AUTO-MCNC: 240 MG/DL (ref 65–99)
GLUCOSE SERPL-MCNC: 163 MG/DL (ref 65–99)
GLUCOSE SERPL-MCNC: 221 MG/DL (ref 65–99)
GLUCOSE SERPL-MCNC: 254 MG/DL (ref 65–99)
HCO3 BLDA-SCNC: 25.5 MMOL/L (ref 17–25)
HCT VFR BLD AUTO: 42.1 % (ref 42–52)
HGB BLD-MCNC: 14.2 G/DL (ref 14–18)
HOROWITZ INDEX BLDA+IHG-RTO: 240 MM[HG]
LACTATE BLD-SCNC: 1.6 MMOL/L (ref 0.5–2)
LYMPHOCYTES # BLD AUTO: 0 K/UL (ref 1–4.8)
LYMPHOCYTES NFR BLD: 0 % (ref 22–41)
MAGNESIUM SERPL-MCNC: 1.6 MG/DL (ref 1.5–2.5)
MAGNESIUM SERPL-MCNC: 1.7 MG/DL (ref 1.5–2.5)
MANUAL DIFF BLD: NORMAL
MCH RBC QN AUTO: 29.3 PG (ref 27–33)
MCHC RBC AUTO-ENTMCNC: 33.7 G/DL (ref 33.7–35.3)
MCV RBC AUTO: 86.8 FL (ref 81.4–97.8)
MODE IMODE: ABNORMAL
MONOCYTES # BLD AUTO: 0.91 K/UL (ref 0–0.85)
MONOCYTES NFR BLD AUTO: 4.4 % (ref 0–13.4)
MORPHOLOGY BLD-IMP: NORMAL
NEUTROPHILS # BLD AUTO: 19.69 K/UL (ref 1.82–7.42)
NEUTROPHILS NFR BLD: 90.4 % (ref 44–72)
NEUTS BAND NFR BLD MANUAL: 5.2 % (ref 0–10)
NRBC # BLD AUTO: 0.06 K/UL
NRBC BLD-RTO: 0.3 /100 WBC
O2/TOTAL GAS SETTING VFR VENT: 30 %
OVALOCYTES BLD QL SMEAR: NORMAL
PCO2 BLDA: 28.3 MMHG (ref 26–37)
PEEP END EXPIRATORY PRESSURE IPEEP: 8 CMH20
PH BLDA: 7.56 [PH] (ref 7.4–7.5)
PHOSPHATE SERPL-MCNC: 0.9 MG/DL (ref 2.5–4.5)
PHOSPHATE SERPL-MCNC: 2.1 MG/DL (ref 2.5–4.5)
PLATELET # BLD AUTO: 50 K/UL (ref 164–446)
PLATELET BLD QL SMEAR: NORMAL
PO2 BLDA: 72 MMHG (ref 64–87)
POIKILOCYTOSIS BLD QL SMEAR: NORMAL
POTASSIUM SERPL-SCNC: 3.5 MMOL/L (ref 3.6–5.5)
POTASSIUM SERPL-SCNC: 3.6 MMOL/L (ref 3.6–5.5)
POTASSIUM SERPL-SCNC: 3.6 MMOL/L (ref 3.6–5.5)
RBC # BLD AUTO: 4.85 M/UL (ref 4.7–6.1)
RBC BLD AUTO: PRESENT
SAO2 % BLDA: 97 % (ref 93–99)
SODIUM SERPL-SCNC: 134 MMOL/L (ref 135–145)
SODIUM SERPL-SCNC: 136 MMOL/L (ref 135–145)
SODIUM SERPL-SCNC: 136 MMOL/L (ref 135–145)
SPECIMEN DRAWN FROM PATIENT: ABNORMAL
TIDAL VOLUME IVT: 530 ML
TRIGL SERPL-MCNC: 94 MG/DL (ref 0–149)
UFH PPP CHRO-ACNC: 0.26 IU/ML
WBC # BLD AUTO: 20.6 K/UL (ref 4.8–10.8)

## 2022-05-29 PROCEDURE — 83605 ASSAY OF LACTIC ACID: CPT

## 2022-05-29 PROCEDURE — 99233 SBSQ HOSP IP/OBS HIGH 50: CPT | Performed by: INTERNAL MEDICINE

## 2022-05-29 PROCEDURE — A9270 NON-COVERED ITEM OR SERVICE: HCPCS | Performed by: STUDENT IN AN ORGANIZED HEALTH CARE EDUCATION/TRAINING PROGRAM

## 2022-05-29 PROCEDURE — 85007 BL SMEAR W/DIFF WBC COUNT: CPT

## 2022-05-29 PROCEDURE — 94003 VENT MGMT INPAT SUBQ DAY: CPT

## 2022-05-29 PROCEDURE — 85520 HEPARIN ASSAY: CPT

## 2022-05-29 PROCEDURE — 84478 ASSAY OF TRIGLYCERIDES: CPT

## 2022-05-29 PROCEDURE — 700102 HCHG RX REV CODE 250 W/ 637 OVERRIDE(OP): Performed by: STUDENT IN AN ORGANIZED HEALTH CARE EDUCATION/TRAINING PROGRAM

## 2022-05-29 PROCEDURE — 770022 HCHG ROOM/CARE - ICU (200)

## 2022-05-29 PROCEDURE — 700101 HCHG RX REV CODE 250: Performed by: INTERNAL MEDICINE

## 2022-05-29 PROCEDURE — 80048 BASIC METABOLIC PNL TOTAL CA: CPT | Mod: 91

## 2022-05-29 PROCEDURE — 700111 HCHG RX REV CODE 636 W/ 250 OVERRIDE (IP): Performed by: INTERNAL MEDICINE

## 2022-05-29 PROCEDURE — 700102 HCHG RX REV CODE 250 W/ 637 OVERRIDE(OP): Performed by: INTERNAL MEDICINE

## 2022-05-29 PROCEDURE — 71045 X-RAY EXAM CHEST 1 VIEW: CPT

## 2022-05-29 PROCEDURE — 82962 GLUCOSE BLOOD TEST: CPT | Mod: 91

## 2022-05-29 PROCEDURE — 99231 SBSQ HOSP IP/OBS SF/LOW 25: CPT | Performed by: ORTHOPAEDIC SURGERY

## 2022-05-29 PROCEDURE — 84100 ASSAY OF PHOSPHORUS: CPT | Mod: 91

## 2022-05-29 PROCEDURE — 90935 HEMODIALYSIS ONE EVALUATION: CPT

## 2022-05-29 PROCEDURE — 83735 ASSAY OF MAGNESIUM: CPT

## 2022-05-29 PROCEDURE — 37799 UNLISTED PX VASCULAR SURGERY: CPT

## 2022-05-29 PROCEDURE — 80162 ASSAY OF DIGOXIN TOTAL: CPT

## 2022-05-29 PROCEDURE — 82803 BLOOD GASES ANY COMBINATION: CPT

## 2022-05-29 PROCEDURE — 99291 CRITICAL CARE FIRST HOUR: CPT | Performed by: INTERNAL MEDICINE

## 2022-05-29 PROCEDURE — 85025 COMPLETE CBC W/AUTO DIFF WBC: CPT

## 2022-05-29 PROCEDURE — 700105 HCHG RX REV CODE 258: Performed by: INTERNAL MEDICINE

## 2022-05-29 PROCEDURE — 99292 CRITICAL CARE ADDL 30 MIN: CPT | Performed by: INTERNAL MEDICINE

## 2022-05-29 PROCEDURE — 94799 UNLISTED PULMONARY SVC/PX: CPT

## 2022-05-29 PROCEDURE — A9270 NON-COVERED ITEM OR SERVICE: HCPCS | Performed by: INTERNAL MEDICINE

## 2022-05-29 RX ORDER — DIGOXIN 0.25 MG/ML
250 INJECTION INTRAMUSCULAR; INTRAVENOUS ONCE
Status: COMPLETED | OUTPATIENT
Start: 2022-05-29 | End: 2022-05-29

## 2022-05-29 RX ORDER — POTASSIUM CHLORIDE 20 MEQ/1
40 TABLET, EXTENDED RELEASE ORAL ONCE
Status: DISCONTINUED | OUTPATIENT
Start: 2022-05-29 | End: 2022-05-29

## 2022-05-29 RX ADMIN — HYDROCORTISONE SODIUM SUCCINATE 50 MG: 100 INJECTION, POWDER, FOR SOLUTION INTRAMUSCULAR; INTRAVENOUS at 12:49

## 2022-05-29 RX ADMIN — SODIUM PHOSPHATE, MONOBASIC, MONOHYDRATE AND SODIUM PHOSPHATE, DIBASIC, ANHYDROUS 30 MMOL: 276; 142 INJECTION, SOLUTION INTRAVENOUS at 10:40

## 2022-05-29 RX ADMIN — MINERAL OIL, PETROLATUM 1 APPLICATION: 425; 573 OINTMENT OPHTHALMIC at 05:12

## 2022-05-29 RX ADMIN — HYDROCORTISONE SODIUM SUCCINATE 50 MG: 100 INJECTION, POWDER, FOR SOLUTION INTRAMUSCULAR; INTRAVENOUS at 05:12

## 2022-05-29 RX ADMIN — PROPOFOL 5 MCG/KG/MIN: 10 INJECTION, EMULSION INTRAVENOUS at 05:11

## 2022-05-29 RX ADMIN — MEROPENEM 500 MG: 500 INJECTION, POWDER, FOR SOLUTION INTRAVENOUS at 17:17

## 2022-05-29 RX ADMIN — FAMOTIDINE 20 MG: 20 TABLET, FILM COATED ORAL at 05:12

## 2022-05-29 RX ADMIN — SENNOSIDES AND DOCUSATE SODIUM 2 TABLET: 50; 8.6 TABLET ORAL at 17:17

## 2022-05-29 RX ADMIN — MINERAL OIL, PETROLATUM 1 APPLICATION: 425; 573 OINTMENT OPHTHALMIC at 15:52

## 2022-05-29 RX ADMIN — HYDROCORTISONE SODIUM SUCCINATE 50 MG: 100 INJECTION, POWDER, FOR SOLUTION INTRAMUSCULAR; INTRAVENOUS at 00:16

## 2022-05-29 RX ADMIN — HEPARIN SODIUM 20 UNITS/KG/HR: 5000 INJECTION, SOLUTION INTRAVENOUS at 03:59

## 2022-05-29 RX ADMIN — HYDROMORPHONE HYDROCHLORIDE 0.5 MG: 1 INJECTION, SOLUTION INTRAMUSCULAR; INTRAVENOUS; SUBCUTANEOUS at 10:39

## 2022-05-29 RX ADMIN — MINERAL OIL, PETROLATUM 1 APPLICATION: 425; 573 OINTMENT OPHTHALMIC at 22:28

## 2022-05-29 RX ADMIN — HYDROCORTISONE SODIUM SUCCINATE 50 MG: 100 INJECTION, POWDER, FOR SOLUTION INTRAMUSCULAR; INTRAVENOUS at 17:17

## 2022-05-29 RX ADMIN — POTASSIUM BICARBONATE 50 MEQ: 978 TABLET, EFFERVESCENT ORAL at 22:29

## 2022-05-29 RX ADMIN — DAKIN'S SOLUTION 0.125% (QUARTER STRENGTH) 473 ML: 0.12 SOLUTION at 05:12

## 2022-05-29 RX ADMIN — DAKIN'S SOLUTION 0.125% (QUARTER STRENGTH) 473 ML: 0.12 SOLUTION at 17:18

## 2022-05-29 RX ADMIN — DIGOXIN 250 MCG: 0.25 INJECTION INTRAMUSCULAR; INTRAVENOUS at 12:49

## 2022-05-29 RX ADMIN — HEPARIN SODIUM 4100 UNITS: 1000 INJECTION, SOLUTION INTRAVENOUS; SUBCUTANEOUS at 07:02

## 2022-05-29 RX ADMIN — HEPARIN SODIUM 2800 UNITS: 1000 INJECTION, SOLUTION INTRAVENOUS; SUBCUTANEOUS at 18:15

## 2022-05-29 RX ADMIN — HYDROMORPHONE HYDROCHLORIDE 0.5 MG: 1 INJECTION, SOLUTION INTRAMUSCULAR; INTRAVENOUS; SUBCUTANEOUS at 22:28

## 2022-05-29 RX ADMIN — HYDROMORPHONE HYDROCHLORIDE 0.5 MG: 1 INJECTION, SOLUTION INTRAMUSCULAR; INTRAVENOUS; SUBCUTANEOUS at 19:54

## 2022-05-29 ASSESSMENT — PAIN DESCRIPTION - PAIN TYPE
TYPE: ACUTE PAIN

## 2022-05-29 NOTE — PROGRESS NOTES
"Critical Care Medicine Faculty Progress Note    HPI: 66y M Hx of renal transplant 9/10/2010 for end stage polycystic kidney dz maintain on prednisone, tacrolimus and mcyophenolate, DM, chronic thrombocytopenia, CKD 3B, Pafib, bronchitis, Covid 19 11/2020. That on Sunday hit his shin. He since has been complaining of pain. This morning he called his younger brother. His brother went to his house found him altered. He was brought in by EMS as a trauma activation since he had AMS and bruising from his fall. CT head negative, CTA chest negative, CT abdomen negative, CT spine negative other then degenerative findings. He was in severe septic shock, started on pressors and fluid bolus. He was given broad spectrum microbials. His only compliant is his right leg. LP is currently pending and was traumatic. He would like to be full code.     Exam:Ill appearing on ventilator and multiple support devices, bruising/scratch to left forehead, lungs mechanical coarse, heart irregular without murmurs, abdomen soft nttp, ext with edema significant edema, bilateral feet but warm knees and rest of extremities. Right lower shin with bruising and small old upside down \"U\" cut scabbed no fluctuance, bruising to left knee, no groin adenopathy, Neuro he is sedated on ventilator. Skin with areas of bruising knees and toes, blistering to right shin area and weaping    Rounding Report:  Neuro: localizing trying to open eyes, no pain  HR: 100-110's  SBP: map > 80 levophed 12  Tmax: 99.1  GI: TF cortrac at goal, BM 5/28  UOP: bladder scan 100ml  Lines: central line, art line, dialysis  Resp: vent day 4 26 530 8 30% 7.56/28/72   Vte: held  PPI/H2:pepcid  Antibx: Zosyn -> meropenem day 2, e coli, blood cx negative from 5/28     A/P:  D/c heparin gtt  Goal map 65 wean pressor to achieve this  Replete phos  Daily aggressive iHD to keep up with volume 5-6L  CXR  Net + 12L net 24hrs +145ml  Qshift bladder u/s  Check dig level then consider Digoxin " 250mcg  Wean sedation, mobilize  Drop RR to 26  Possible come of insulin gtt later today      Severe septic shock likely related to severe soft tissue infection: Initially Concerns for Toxic shock syndrome with his erythroderma  Empiric rx clinda + zosyn + linezolid transitioned to Zosyn guided by ID with his E Coli bacteremia. Monitor need for surgical debridement appreciate consultation: pain disproportionate to exam, pain/edema beyond erythema, anesthesia, crepitus, blistering/bullae, hyponatremia, rapid progression, gas on imaging or facial edema or enhancement (avoid using LRINEC score poor sensitivity)  Consider Bx of tissue to rule out fasciitis/myositis or cut down looking for dishwater color fluid  Fluid therapy with resus and norepinephrine for map > 65    Septic Shock: s/p fluid resus, monitor end organ, lactate continue with norepinephrine    E Coli Bacteremia: Blood 2/2 5/24 follow up speciation continue broad spectrum antibiotics until speciation and potential polymycorbial, ID consulting, intermediate resistance pattern.     Chronic immunosuppression: hold with severe life threatening sepsis   Stress dose steroids for relative adrenal insufficiency. Tacrolimius level 5.4 3/2022 follow repeat level on admission.     Encephalopathy: improved likely hypoperfusion and septic. Serial neuro exam. LP negative for meningitis    Thrombocytopenia: chronic serial monitor monitor for bleeding, trend    SANKET on CKD: Hx of CKD s/p renal transplant, acute renal injury related to septic shock and ischemic atn s/p contrast die load, avoid nephrotoxins, check CPK, nephrology consultation, avoid hyperchloremic resus fluid. Started on CRRT 5/26. Lasix challenge when appropriate to monitor for renal recovery. Daily iHD, concentrate all gtts    Lactic acidosis: due to sepsis serial monitor, also poor clearence with renal injury.     DM: aggressive glucose control for better source of infection consider insulin gtt. HgA1C  10.7. Poorly controlled start insulin gtt 5/28    Volume overload: Net + 13L judicious fluid management.  CRRT for volume removal.     Hyperkalemia: S/p hyper K cocktail 5/26, Started on CRRT 5/26, daily labs, daily iHD.      Cardiomyopathy: likely septic vs ischemic and old AMI in nature wean off vasopressin and use norepinephrine for ionopressor, EF 35% with LVH    Paroxysmal Atrial fibrillation: hx of continue to optimize filling pressure and electrolytes, he is rate controlled, start anticoagulation. Patient worsens with epinephrine gtt. Trial of IV digoxin 5/28    Acute hypoxic hypercarbic respiratory failure: intubated 5/26 for airway protection and respiratory failure, A-F bundle, daily SAT/SBT, aspiration precautions.     Lower extremity ischemia: follow up on duplex u/s likely related to vasopressors, wean off vasopressin map goal 65 strict, warm extremities with blankets, serial monitor.     PEA Cardiac Arrest 5/27: patient while on pressor and dialysis had a brief PEA arrest 1 epi and ROSC was achieved likely from aggressive dialysis removal with LVH and diastolic, ekg, pocus, CXR all was unremarkable on heparin.     Right knee effusion w/ prior total knee replacement: s/p arthrocentesis 5/28 follow up on fluid culture    Patient remains in critical condition from septic shock titration of norepinephrine, ventilator, iHD and close monitoring. Critical care time provided was 79 minutes. This excludes all separate billable procedures.       Reuben Swartz MD  Critical Care Medicine

## 2022-05-29 NOTE — PROGRESS NOTES
"[facility] -  PROGRESS NOTE               Author: Yvrose Alvares D.O. Date & Time: 5/29/2022  7:22 AM     HPI:  Patient is a 66 year old male with a PMHx of renal transplant 9/10/2010 for polycystic kidney disease, DM, thrombocytopenia, CKD3b, pafib, covid19 infection, who presents for AMS.  He was brought in activated trauma for fall, CT imaging has been negative, but was in severe septic shock started on pressors and give nfluid boluses.  On broad spectrum abx.  Currently complains of right leg pain but thinks its better.  Confirms his last dose of tacrolimus and cellcept was yesterday.  Currently on levophed    DAILY NEPHROLOGY SUMMARY:  5/24: Consult done  5/25: NAEO, no complaints, feels a bit better, family at bedside  5/26: Decompensated overnight, intubated due to resp. Distress and pressors initiated  5/27: NAEO, no complaints, hemodynamics decompensated and CRRT initiated instead, tolerated well overnight, still on it this AM  5/28: transitioned to daily iHD for now, +13.8L for hospitalization, remains intubated, on pressor support, Tmax 101.1F, WBC 28.9  5/29: tolerated HD, UF 3L 5/28, remains intubated and on pressor support    REVIEW OF SYSTEMS:    Unable to obtain, intubated    PMH/PSH/SH/FH:   Reviewed and unchanged since admission note    CURRENT MEDICATIONS:   Reviewed from admission to present day    VS:  /62   Pulse 79   Temp 37.3 °C (99.1 °F) (Temporal)   Resp (!) 30   Ht 1.956 m (6' 5\")   Wt 112 kg (246 lb 14.6 oz)   SpO2 98%   BMI 29.28 kg/m²     Physical Exam  Vitals and nursing note reviewed.   Constitutional:       Appearance: Normal appearance. He is ill-appearing.      Interventions: He is sedated and intubated.   HENT:      Head: Normocephalic and atraumatic.      Right Ear: External ear normal.      Left Ear: External ear normal.      Mouth/Throat:      Comments: ETT in place  Eyes:      General: No scleral icterus.  Cardiovascular:      Rate " and Rhythm: Tachycardia present. Rhythm irregular.   Pulmonary:      Effort: Pulmonary effort is normal. He is intubated.   Abdominal:      General: There is no distension.      Palpations: Abdomen is soft.   Musculoskeletal:         General: No deformity.      Right lower leg: Edema present.      Left lower leg: Edema present.   Skin:     Findings: Lesion (RLE with open wound) present. No rash.   Neurological:      Comments: sedated   Psychiatric:      Comments: sedated       Fluids:  In: 2050.1 [I.V.:820.1; Other:90; Dialysis:500]  Out: 3710     LABS:  Recent Labs     05/28/22  1445 05/28/22  2130 05/29/22  0420   SODIUM 135 136 136   POTASSIUM 3.5* 3.5* 3.6   CHLORIDE 98 99 100   CO2 22 23 22   GLUCOSE 262* 277* 221*   BUN 38* 49* 57*   CREATININE 2.58* 3.03* 3.31*   CALCIUM 8.1* 8.2* 8.1*     IMAGING:   All imaging reviewed from admission to present day    IMPRESSION:  # SANKET  - Etiology likely 2/2 ATN  - CPK mildly elevated 5/24  - has received contrast  - Non-oliguric, but UOP dropping it seems  - TempCath placed by ICU  # CKD Stage 3b  - BCr ~ 1.5-1.9  - Etiology likely 2/2 HTN/DM  # E. Coli bacteremia/septic shock  - Source unclear, ?colitis on imaging  - Abx on board  - pressor support  - ID following  # Hyperkalemia, improved  - No ECG changes  # Encephalopathy  - Etiology likely 2/2 hypoperfusion/sepsis  # Acute respiratory failure  - Intubated 5/26  # DDKT  - Etiology 2/2 ADPKD  - XPL in 2010  - On Tac/MMF/Pred regimen but being held due to sepsis  # Chronic immunosuppression  - held  # RLE ulcer  - Trauma  # type 2 DM  # thrombocytopenia  - worsening    PLAN:  - Continue iHD daily  - UF as tolerated  - Daily labs and weights  - Monitor I/O  - Maintain MAP > 65  - Continue holding IS meds for now  - Dose all meds per eGFR < 15    I have personally reviewed all laboratory values, microbiology studies, radiographic images, and current medications.  The patient is critically ill with one or more organ  system(s) failing and without intervention, survival is jeopardized.  To prevent further life threatening deterioration and/or organ failure, I have spent Critical Care time in direct patient care/floor time, of HIGH Medical decision making, exclusive of procredures, and without overlapping physician care.    Brief Description of CC services performed:  Evaluation of notes from various team members for RRT needs and arrangement of it if needed.  Discussion with staff regarding renal care and plan moving forward.    Total CC Time = 31 minutes

## 2022-05-29 NOTE — PROGRESS NOTES
Dr. Swartz notified that patient had received 4100 unit bolus of heparin for non-therapeutic anti-Xa along with an infusion rate increase prior to heparin discontinuation orders. No new orders at this time.

## 2022-05-29 NOTE — PROGRESS NOTES
Intubated  AVSS  WBC 20  No fluctuance  Generalized right leg edema, small developing eschar at blister site  Synovial culture - NGTD  Continue ABX  No surgical indications

## 2022-05-29 NOTE — PROGRESS NOTES
Infectious Disease Progress Note    Author: Vivian Francois M.D. Date & Time of service: 2022  8:02 AM    Chief Complaint:  Follow-up for septic shock, E. coli bacteremia, right lower extremity cellulitis    Interval History:   afebrile, WBC 24.5, blood cultures now growing E. Coli, meningitis CSF PCR panel not detected.  Patient remains on pressors.  Patient was noted to be obtunded with respiratory distress early this morning and has not now been intubated.  Renal function worse today.  Daughter and girlfriend at bedside with questions   T-max 99.7 WBC 26.8, remains on multiple pressors with increasing requirements overnight, remains on the vent however oxygenation requirements improving, renal function improving on CRRT   T-max 101.1 WBC 28.9.  Patient developed PEA arrest and underwent CPR yesterday.  White count worsening.  Remains on pressors.  Chest x-ray worse this morning.  CT of the right lower extremity shows right knee joint effusion and circumferential edema involving the soft tissues of the right lower leg.  CT abdomen and pelvis some wall thickening in the descending colon related to underdistention or colitis.  Remains sedated.  Ortho PA bedside performing right knee joint aspiration-fluid is cloudy yellow   afebrile, WBC 20.6 joint aspiration fluid not consistent with infection, remains on the vent however FiO2 decreased to 30%.  Remains on pressors but decreasing requirements.  Sedated      Labs Reviewed, Medications Reviewed and Wound Reviewed.    Review of Systems:  Review of Systems   Unable to perform ROS: Intubated       Hemodynamics:  Temp (24hrs), Av.6 °C (97.9 °F), Min:36.1 °C (96.9 °F), Max:37.3 °C (99.1 °F)  Temperature: 37.3 °C (99.1 °F), Monitored Temp: 37.8 °C (100.04 °F)  Pulse  Av.1  Min: 43  Max: 159   Blood Pressure : (!) 92/65, Arterial BP: 113/70       Physical Exam:  Physical Exam  Vitals and nursing note reviewed.   Constitutional:        Appearance: He is ill-appearing.   HENT:      Mouth/Throat:      Mouth: Mucous membranes are moist.      Comments: ET tube  Eyes:      Comments: Eyes closed   Neck:      Comments: Right IJ catheter    Left IJ HD catheter  Cardiovascular:      Rate and Rhythm: Tachycardia present.   Abdominal:      Palpations: Abdomen is soft.   Musculoskeletal:      Right lower leg: Edema present.      Comments: Right knee surgical scar    Open wound on the anterior aspect of the right lower extremity dressed and packed      Bilateral feet cool to the touch with mottling, right greater than left    Blister formation on right foot and leg    Right knee swelling   Skin:     General: Skin is warm and dry.   Neurological:      Comments: Intubated and sedated   Psychiatric:      Comments: Unable to assess as patient intubated and sedated         Meds:    Current Facility-Administered Medications:   •  insulin regular 100 units in 100 mL 0.9% NaCl infusion  •  dextrose bolus  •  norepinephrine (Levophed) infusion  •  meropenem  •  artificial tears  •  heparin  •  propofol **AND** Triglycerides Starting now and then Every 3 Days  •  acetaminophen  •  Pharmacy  •  Respiratory Therapy Consult  •  famotidine **OR** famotidine  •  senna-docusate **AND** polyethylene glycol/lytes **AND** magnesium hydroxide **AND** bisacodyl  •  lidocaine  •  sodium chloride  •  dakins 0.125% (1/4 strength)  •  hydrocortisone sodium succinate PF  •  HYDROmorphone    Labs:  Recent Labs     05/26/22  0917 05/27/22  0516 05/28/22  0309 05/29/22  0420   WBC 30.4* 26.8* 28.9* 20.6*   RBC 4.85 4.95 4.94 4.85   HEMOGLOBIN 14.5 14.3 14.3 14.2   HEMATOCRIT 45.8 44.7 43.3 42.1   MCV 94.4 90.3 87.7 86.8   MCH 29.9 28.9 28.9 29.3   RDW 51.3* 49.9 48.7 47.5   PLATELETCT 89* 78* 58* 50*   MPV 11.7 12.7  --   --    NEUTSPOLYS 78.30* 91.30* 95.60* 90.40*   LYMPHOCYTES 0.90* 0.90* 0.00* 0.00*   MONOCYTES 6.10 6.10 3.50 4.40   EOSINOPHILS 0.00 0.00 0.00 0.00   BASOPHILS 0.00  0.00 0.00 0.00   RBCMORPHOLO Present Present Present Present     Recent Labs     05/28/22  1445 05/28/22 2130 05/29/22  0420   SODIUM 135 136 136   POTASSIUM 3.5* 3.5* 3.6   CHLORIDE 98 99 100   CO2 22 23 22   GLUCOSE 262* 277* 221*   BUN 38* 49* 57*     Recent Labs     05/26/22  2040 05/27/22  0516 05/27/22  1205 05/28/22  1445 05/28/22  2130 05/29/22  0420   ALBUMIN 2.6* 2.5*  --  2.3*  --   --    TBILIRUBIN 0.8 0.8  --  0.8  --   --    ALKPHOSPHAT 67 78  --  128*  --   --    TOTPROTEIN 5.4* 5.4*  --  5.4*  --   --    ALTSGPT 34 32  --  29  --   --    ASTSGOT 34 39  --  27  --   --    CREATININE 4.29* 2.99*   < > 2.58* 3.03* 3.31*    < > = values in this interval not displayed.       Imaging:  CT-CSPINE WITHOUT PLUS RECONS    Result Date: 5/24/2022 5/24/2022 11:38 AM HISTORY/REASON FOR EXAM: Fall and neck pain TECHNIQUE/EXAM DESCRIPTION: CT cervical spine without contrast, with reconstructions. The study was performed on a helical multidetector CT scanner. Thin-section helical scanning was performed from the skull base through T1. Sagittal and coronal multiplanar reconstructions were generated from the axial images. Low dose optimization technique was utilized for this CT exam including automated exposure control and adjustment of the mA and/or kV according to patient size. COMPARISON:  None. FINDINGS: Alignment in the cervical spine is normal. There is no fracture or dislocation. The craniovertebral junction appears intact. The prevertebral and paraspinous soft tissues are unremarkable. There is moderate disc space. C6-7 level with marginal osteophytosis and moderate posterior spurring. The superior mediastinum and lung apices in the field of view are unremarkable.     1.  Moderate osteoarthritic changes at the C6-7 level with disc space narrowing and marginal osteophytosis. Further there is moderate cervical spondylotic changes at this level. 2.  No evidence of cervical spine fracture and/or  subluxation.    CT-ABDOMEN-PELVIS WITH    Result Date: 5/24/2022 5/24/2022 11:39 AM HISTORY/REASON FOR EXAM:  trauma red. TECHNIQUE/EXAM DESCRIPTION:   CT scan of the abdomen and pelvis with contrast. Contrast-enhanced helical scanning was obtained from the diaphragmatic domes through the pubic symphysis following the bolus administration of nonionic contrast without complication. 75 mL of Omnipaque 350 nonionic contrast was administered without complication. Low dose optimization technique was utilized for this CT exam including automated exposure control and adjustment of the mA and/or kV according to patient size. COMPARISON: Complete abdominal sonogram, 9/14/2021. FINDINGS: Lower Chest: Dependent atelectasis in the lung bases. No pleural effusion or pneumothorax. No pericardial effusion. Liver: Normal. Spleen: Unremarkable. Pancreas: Unremarkable. Gallbladder: No calcified stones. Biliary: Nondilated. Adrenal glands: Normal. Kidneys: Absent right kidney. Right lower quadrant transplant kidney without hydronephrosis. Enlarged left kidney with 2 numerous to count fluid attenuation lesions throughout the cortex. Nonobstructing stones in the lower pole of the left kidney. No hydronephrosis or hydroureter. Bowel: Sigmoid colon diverticulosis, without diverticulitis. Normal appendix. Lymph nodes: No adenopathy. Vasculature: Calcified atherosclerotic plaques in the aorta and iliac arteries, without aneurysmal dilation. Peritoneum: Unremarkable without ascites. Musculoskeletal: No acute or destructive process. Multilevel lumbar spondylosis. Pelvis: The bladder is decompressed by an indwelling Epps catheter.. Small umbilical hernia containing fat.     1. No acute posttraumatic findings in the abdomen or pelvis. 2. Bibasilar subsegmental atelectasis. 3. Right pelvic transplant kidney without hydronephrosis. 4. Polycystic left kidney. 5. Diverticulosis without diverticulitis. Normal appendix.    CT-EXTREMITY, LOWER W/O  RIGHT    Result Date: 5/24/2022 5/24/2022 4:17 PM HISTORY/REASON FOR EXAM:  Soft tissue infection suspected, lower leg, no prior imaging; necrotizing fasciitis to right lower ext, recieved large amounts of contrast already with renal transplant. TECHNIQUE/EXAM DESCRIPTION AND NUMBER OF VIEWS: CT scan of the RIGHT lower extremity without contrast and including reconstructions. Thin-section noncontrast helical images were obtained. Coronal and sagittal reconstructions were generated from the axial images. Up to date radiation dose reduction adjustments have been utilized to meet ALARA standards for radiation dose reduction. COMPARISON: None. FINDINGS: Right total knee replacement. There is decreased bony mineralization of the right lower extremity. There is no evidence of ulceration or soft tissue mass in the right lower extremity. There is no evidence of large abscess formation. There is diffuse atherosclerotic calcification of the right superficial femoral artery. There are curvilinear regions of soft tissue attenuation throughout the subcutaneous tissues of the right mid and distal lower leg.     1.  Status post right total knee replacement. 2.  Diffuse atherosclerotic calcification of the arterial system of the right lower extremity suspicious for diabetes mellitus. 3.  Soft tissue attenuation in the subcutaneous tissues of the mid to distal lower leg and foot suspicious for cellulitis. 4.  No evidence of soft tissue ulceration in the right lower leg or foot. 5.  No evidence of acute osteomyelitis in the right lower leg or foot. 6.  Small subcutaneous abscesses cannot be excluded without the use of intravenous contrast.    CT-HEAD W/O    Result Date: 5/24/2022 5/24/2022 11:38 AM HISTORY/REASON FOR EXAM:  trauma red. TECHNIQUE/EXAM DESCRIPTION AND NUMBER OF VIEWS: CT of the head without contrast. The study was performed on a helical multidetector CT scanner. Contiguous axial sections were obtained from the skull  base through the vertex. Up to date radiation dose reduction adjustments have been utilized to meet ALARA standards for radiation dose reduction. COMPARISON:  None available FINDINGS: The calvariae and skull base are unremarkable. There are no extraaxial fluid collections. There is a pattern of cerebral atrophy manifest as enlargement of sulcal markings and ventricular prominence. The ventricular system and basal cisterns are otherwise unremarkable. There are no areas of abnormal density in the brain substance. There are no hemorrhagic lesions. There are no mass effects or shift of midline structures. The brainstem and posterior fossa structures are unremarkable. Paranasal sinuses in the field of view are unremarkable. Mastoids in the field of view are unremarkable.     1.  Cerebral atrophy. 2.  Otherwise, Head CT without contrast within normal limits. No evidence of acute cerebral infarction, hemorrhage or mass lesion.     DX-CHEST-LIMITED (1 VIEW)    Result Date: 5/25/2022 5/25/2022 7:52 AM HISTORY/REASON FOR EXAM:  Central line placement TECHNIQUE/EXAM DESCRIPTION AND NUMBER OF VIEWS: Single portable view of the chest. COMPARISON: Chest radiography, 5/25/2022 at 0719 hours FINDINGS: Lungs: Symmetric low lung volumes. Stable linear and patchy opacities are unchanged. Stable small left pleural effusion. The right costophrenic recess is sharp. No visible pneumothorax bilaterally. Mediastinum: Cardiac silhouette size remains enlarged. Other: The right internal jugular central venous access catheter placed in the interval terminates over the right atrium. The remaining visualized bones and soft tissues are stable radiographically.     1. Right internal jugular central venous access catheter placed in the interval terminates over the upper aspect of the right atrium. No postprocedure visible pneumothorax. 2. Stable patchy parenchymal opacities in the lungs, with a stable small left pleural  effusion.    DX-CHEST-LIMITED (1 VIEW)    Result Date: 5/24/2022 5/24/2022 11:26 AM HISTORY/REASON FOR EXAM:  Chest Pain. TECHNIQUE/EXAM DESCRIPTION AND NUMBER OF VIEWS: Single AP view of the chest. COMPARISON:  Chest x-ray 11/13/2020 FINDINGS: Lungs:  There are curvilinear opacities in the lung fields bilaterally most pronounced in the perihilar regions. Pleura:  There is no pleural effusion or pneumothorax. Heart and mediastinum:  The heart silhouette is mildly enlarged Bones:  Normal     1.  Curvilinear perihilar opacities likely atelectasis and/or parenchymal scarring. 2.  Mild cardiomegaly.    DX-CHEST-PORTABLE (1 VIEW)    Result Date: 5/25/2022 5/25/2022 7:12 AM HISTORY/REASON FOR EXAM: Shortness of Breath TECHNIQUE/EXAM DESCRIPTION:  Single AP view of the chest. COMPARISON: Yesterday FINDINGS: Cardiomegaly is observed. The mediastinal contour appears within normal limits.  The central  pulmonary vasculature appears prominent and indistinct. Bilateral lung volumes are diminished.  Diffuse scattered hazy pulmonary parenchymal opacities are seen. Blunting of bilateral costophrenic angles is seen, compatible with trace bilateral pleural effusions. The bony structures appear age-appropriate.     1.  Pulmonary edema and/or infiltrates are identified, which are stable since the prior exam. 2.  Trace bilateral pleural effusions 3.  Cardiomegaly    DX-CHEST-PORTABLE (1 VIEW)    Result Date: 5/24/2022 5/24/2022 1:00 PM HISTORY/REASON FOR EXAM:  Central line placement. TECHNIQUE/EXAM DESCRIPTION AND NUMBER OF VIEWS: Single portable view of the chest. COMPARISON: Chest radiograph, 5/24/2022 at 1147 hours FINDINGS: Lungs: Stable curvilinear opacities in the lung fields, greatest in the perihilar regions, stable when compared to prior study. Stable bibasal parenchymal volume loss. No large volume pleural effusion or visible pneumothorax. No pulmonary vascular congestion or interstitial edema. Mediastinum: The cardiac  silhouette size remains enlarged. Other: The left internal jugular central venous access catheter placed in the interval terminates over a persistent left superior vena cava. The remaining visualized bones and soft tissues are stable radiographically.     Left internal jugular central venous access catheter terminates over a persistent left superior vena cava. No postprocedure visible pneumothorax. The remainder is stable.    DX-PELVIS-1 OR 2 VIEWS    Result Date: 5/24/2022 5/24/2022 11:27 AM HISTORY/REASON FOR EXAM:  Pelvic/Hip Pain Following Trauma. TECHNIQUE/EXAM DESCRIPTION AND NUMBER OF VIEWS:  1 view(s) of the pelvis. COMPARISON:  None. FINDINGS: There is normal bony mineralization of the pelvis. There is no evidence of pelvic fracture or osseous lesion. The sacroiliac joints and pubic symphysis are symmetrical.     1.  Unremarkable single AP view of the pelvis.    DX-TIBIA AND FIBULA RIGHT    Result Date: 5/24/2022 5/24/2022 12:17 PM HISTORY/REASON FOR EXAM:  Pain/Deformity Following Trauma. TECHNIQUE/EXAM DESCRIPTION AND NUMBER OF VIEWS:  2 views of the RIGHT tibia and fibula. COMPARISON: Right knee radiography, 12/18/2006 FINDINGS: Bones: Postsurgical changes in the distal right femur and proximal right tibia. Normal bone mineralization. No focal bone lesion. No acute fracture. Joint: Postsurgical changes of right total knee arthroplasty. Ankle mortise is preserved. No subluxation. Soft tissue: Arteriosclerosis. Circumferential right lower leg soft tissue swelling.     1. Right lower leg soft tissue swelling. 2. No acute fracture or subluxation. 3. Postsurgical changes of right total knee arthroplasty.    CT-CTA CHEST PULMONARY ARTERY W/ RECONS    Result Date: 5/24/2022 5/24/2022 11:38 AM HISTORY/REASON FOR EXAM:  Fall and chest pain TECHNIQUE/EXAM DESCRIPTION: CT angiogram scan for pulmonary embolism with contrast, with reconstructions. 1.25 mm helical sections were obtained from the lung apices through  the lung bases following the rapid bolus administration of 75 mL of Omnipaque 350 nonionic contrast. Thin-section overlapping reconstruction interval was utilized. Coronal reconstructions were generated from the axial data. MIP post processing was performed and utilized for the interpretation. Low dose optimization technique was utilized for this CT exam including automated exposure control and adjustment of the mA and/or kV according to patient size. COMPARISON: None FINDINGS: Pulmonary Embolism: No. Main Pulmonary Arteries: No. Segmental branches: No. Subsegmental branches: No. Additional Comments: None. Lungs: There are curvilinear opacities dependently in the lung bases. Pleura: No pleural effusion. Nodes: No enlarged lymph nodes. Additional findings: Diffuse coronary artery calcification.     1.  No CT evidence of pulmonary emboli. 2.  Bibasilar atelectasis. 3.  No evidence of rib fracture. 4. Diffuse coronary artery calcification.    EC-ECHOCARDIOGRAM COMPLETE W/ CONT    Result Date: 2022  Transthoracic Echo Report Echocardiography Laboratory CONCLUSIONS No prior study is available for comparison. The left ventricular ejection fraction is visually estimated to be 35%. Unable to estimate pulmonary artery pressure due to an inadequate tricuspid regurgitant jet. CIARA FORRESTER Exam Date:         2022                    11:35 Exam Location:     Inpatient Priority:          Routine Ordering Physician:        ALEJANDRA FROST Referring Physician: Sonographer:               Jack Lockwood RDCS, RVT Age:    66     Gender:    M MRN:    [id number] :    1955 BSA:    2.4    Ht (in):    77     Wt (lb):    235 Exam Type:     Complete Indications:     Shortness of breath ICD Codes:       786.05 CPT Codes:       03213 BP:   90     /   51     HR:   97 Technical Quality:       Technically difficult study -                          adequate information is obtained MEASUREMENTS  (Male  / Female) Normal Values 2D ECHO LV Diastolic Diameter PLAX        4.6 cm                4.2 - 5.9 / 3.9 - 5.3 cm LV Systolic Diameter PLAX         4.1 cm                2.1 - 4.0 cm IVS Diastolic Thickness           1.2 cm                LVPW Diastolic Thickness          1.2 cm                LVOT Diameter                     2 cm                  Estimated LV Ejection Fraction    35 %                  LV Ejection Fraction MOD BP       38.5 %                >= 55  % LV Ejection Fraction MOD 4C       52.3 %                LV Ejection Fraction MOD 2C       40.7 %                IVC Diameter                      1.8 cm                DOPPLER AV Peak Velocity                  1.5 m/s               AV Peak Gradient                  9.5 mmHg              AV Mean Gradient                  5.7 mmHg              LVOT Peak Velocity                0.8 m/s               AV Area Cont Eq vti               1.6 cm2               MV Velocity Time Integral         13.5 cm               Mitral E Point Velocity           0.63 m/s              Mitral E to A Ratio               1.1                   MV Pressure Half Time             62.8 ms               MV Area PHT                       3.5 cm2               MV Deceleration Time              217 ms                TR Peak Velocity                  275 cm/s              PV Peak Velocity                  1.1 m/s               PV Peak Gradient                  4.6 mmHg              RVOT Peak Velocity                0.79 m/s              * Indicates values subject to auto-interpretation LV EF:  35    % FINDINGS Left Ventricle 3 ml of  contrast was used to evaluate for thrombus in the left ventricular apex. Normal left ventricular chamber size. Mild concentric left ventricular hypertrophy. Moderately reduced left ventricular systolic function. The left ventricular ejection fraction is visually estimated to be 35%.There is  regional wall motion abnormalities may be from old MI.indeterminate  diastolic function. Right Ventricle Normal right ventricular size. Reduced right ventricular systolic function. Right Atrium Enlarged right atrium. Normal inferior vena cava size and inspiratory collapse. Left Atrium Normal left atrial size. Left atrial volume index is 21  mL/sq m. Mitral Valve The mitral valve is not well visualized. No mitral stenosis. Trace mitral regurgitation. Aortic Valve The aortic valve is not well visualized. No aortic valve stenosis. No aortic insufficiency. Tricuspid Valve The tricuspid valve is not well visualized. No tricuspid stenosis. Trace tricuspid regurgitation. Unable to estimate pulmonary artery pressure due to an inadequate tricuspid regurgitant jet. Pulmonic Valve The pulmonic valve is not well visualized. No pulmonic stenosis. Trace pulmonic insufficiency. Pericardium Normal pericardium without effusion. Aorta The ascending aorta diameter is 3.2  cm. Matthew Yoder MD (Electronically Signed) Final Date:     25 May 2022 14:37    [facility] (FOR ED USE ONLY)    Result Date: 5/24/2022 5/24/2022 12:01 PM HISTORY/REASON FOR EXAM:  Ground-level fall with traumatic injury TECHNIQUE/EXAM DESCRIPTION AND NUMBER OF VIEWS:  Limited ultrasound of the abdomen. FAST scan. Focused ultrasound of the 4 quadrants of the abdomen. COMPARISON: None FINDINGS: Focused ultrasound of the 4 quadrants of the abdomen demonstrates no evidence of free fluid in the 4 quadrants.     No free fluid seen in all 4 quadrants. Negative FAST scan.       Micro:  Results     Procedure Component Value Units Date/Time    BLOOD CULTURE [[id number]] Collected: 05/28/22 0800    Order Status: Completed Specimen: Blood from Peripheral Updated: 05/29/22 0707     Significant Indicator NEG     Source BLD     Site PERIPHERAL     Culture Result No Growth  Note: Blood cultures are incubated for 5 days and  are monitored continuously.Positive blood cultures  are called to the RN and reported as soon as  they are  "identified.      Narrative:      Per Hospital Policy: Only change Specimen Src: to \"Line\" if  specified by physician order.  Right Forearm/Arm    BLOOD CULTURE [[id number]] Collected: 05/28/22 0850    Order Status: Completed Specimen: Blood from Peripheral Updated: 05/29/22 0707     Significant Indicator NEG     Source BLD     Site PERIPHERAL     Culture Result No Growth  Note: Blood cultures are incubated for 5 days and  are monitored continuously.Positive blood cultures  are called to the RN and reported as soon as  they are identified.      Narrative:      Per Hospital Policy: Only change Specimen Src: to \"Line\" if  specified by physician order.  Right Forearm/Arm    FLUID CULTURE W/GRAM STAIN [[id number]] Collected: 05/28/22 1000    Order Status: Sent Specimen: Synovial Fluid Updated: 05/28/22 1210     Significant Indicator NEG     Source SYNO     Site SYNOVIAL FLUID     Culture Result -     Gram Stain Result Few WBCs.  No organisms seen.      Narrative:      Collected By: [id number] JIM BEAN  Right knee  Collected By: [id number] JIM BEAN    GRAM STAIN [[id number]] Collected: 05/28/22 1000    Order Status: Completed Specimen: Synovial Updated: 05/28/22 1210     Significant Indicator .     Source SYNO     Site SYNOVIAL FLUID     Gram Stain Result Few WBCs.  No organisms seen.      Narrative:      Collected By: [id number] JIM BEAN  Right knee  Collected By: [id number] JIM BEAN    QUANT BRONCHIAL WASHING [[id number]] Collected: 05/26/22 0944    Order Status: Completed Specimen: Respirate Updated: 05/28/22 0958     Significant Indicator NEG     Source RESP     Site BRONCHOALVEOLAR LAVAGE     Culture Result 4,200 cfu/mL usual upper respiratory shaun  including yeast.  No clinically significant Staphylococcus aureus, Methicillin  Resistant Staphylococcus aureus, or Pseudomonas species  isolated.       Gram Stain Result Moderate WBCs.  Few Gram positive cocci.  <5% intracellular organisms.   " "   Narrative:      Collected By: [id number] SANTOSH BUENO.  Collected By: [id number] SANTOSH BUENO.    CSF CULTURE [[id number]] Collected: 05/24/22 1325    Order Status: Completed Specimen: CSF from Tap Updated: 05/27/22 0946     Significant Indicator NEG     Source CSF     Site TAP     Culture Result No growth at 72 hours.     Gram Stain Result Rare WBCs.  No organisms seen.      GRAM STAIN [[id number]] Collected: 05/26/22 0944    Order Status: Completed Specimen: Respirate Updated: 05/26/22 1847     Significant Indicator .     Source RESP     Site BRONCHOALVEOLAR LAVAGE     Gram Stain Result Moderate WBCs.  Few Gram positive cocci.  <5% intracellular organisms.      Narrative:      Collected By: [id number] SANTOSH LIGHT  Collected By: [id number] SANTOSH BUENO.    Culture Respiratory W/ [facility] - BAL [[id number]]     Order Status: Completed Specimen: Respirate from Bronchoalveolar Lavage     BLOOD CULTURE x2 [[id number]]  (Abnormal) Collected: 05/24/22 1122    Order Status: Completed Specimen: Blood from Peripheral Updated: 05/26/22 0952     Significant Indicator POS     Source BLD     Site PERIPHERAL     Culture Result Growth detected by Bactec instrument. 05/24/2022  23:30      Escherichia coli  See previous culture for sensitivity report.      Narrative:      CALL  [place]  19 tel. [phone],  CALLED  19 tel. [phone] 05/24/2022, 23:31, RB PERF. RESULTS CALLED TO: RN  [phone]  Per Hospital Policy: Only change Specimen Src: to \"Line\" if  specified by physician order.  Left Hand    BLOOD CULTURE x2 [[id number]]  (Abnormal)  (Susceptibility) Collected: 05/24/22 1124    Order Status: Completed Specimen: Blood from Peripheral Updated: 05/26/22 0949     Significant Indicator POS     Source BLD     Site PERIPHERAL     Culture Result Growth detected by Bactec instrument. 05/24/2022  23:30      Escherichia coli    Narrative:      CALL  [facility]  19 tel. [phone],  CALLED  19 tel. [phone] 05/24/2022, 23:31, RB PERF. RESULTS CALLED " "TO:  [phone]  Per Hospital Policy: Only change Specimen Src: to \"Line\" if  specified by physician order.  Right Hand    Susceptibility     Escherichia coli (1)     Antibiotic Interpretation Microscan   Method Status    Ampicillin Resistant >16 mcg/mL CESIA Final    Ceftriaxone Sensitive <=1 mcg/mL CESIA Final    Cefazolin Sensitive <=2 mcg/mL CESIA Final    Ciprofloxacin Sensitive <=0.25 mcg/mL CESIA Final    Cefepime Sensitive <=2 mcg/mL CESIA Final    Cefuroxime Sensitive <=4 mcg/mL CESIA Final    Ampicillin/sulbactam Intermediate 16/8 mcg/mL CESIA Final    Ertapenem Sensitive <=0.5 mcg/mL CESIA Final    Tobramycin Sensitive <=2 mcg/mL CESIA Final    Gentamicin Sensitive <=2 mcg/mL CESIA Final    Minocycline Sensitive <=4 mcg/mL CESIA Final    Moxifloxacin Sensitive <=2 mcg/mL CESIA Final    Pip/Tazobactam Sensitive <=8 mcg/mL CESAI Final    Trimeth/Sulfa Resistant >2/38 mcg/mL CESIA Final    Tigecycline Sensitive <=2 mcg/mL CESIA Final                   RESP CULTURE [[id number]] Collected: 05/26/22 0944    Order Status: Canceled Specimen: Other from Bronchoalveolar Lavage     GRAM STAIN [[id number]] Collected: 05/24/22 1325    Order Status: Completed Specimen: CSF Updated: 05/24/22 1545     Significant Indicator .     Source CSF     Site TAP     Gram Stain Result Rare WBCs.  No organisms seen.      CULTURE WOUND W/ GRAM STAIN [[id number]]     Order Status: Canceled Specimen: Wound from Right Leg     URINALYSIS,CULTURE IF INDICATED [[id number]]  (Abnormal) Collected: 05/24/22 1245    Order Status: Sent Specimen: Urine, Cath Updated: 05/24/22 1323     Color Yellow     Character Clear     Specific Gravity 1.017     Ph 5.0     Glucose 500 mg/dL      Ketones Negative mg/dL      Protein Negative mg/dL      Bilirubin Negative     Urobilinogen, Urine 0.2     Nitrite Negative     Leukocyte Esterase Negative     Occult Blood Negative     Micro Urine Req see below     Comment: Microscopic examination not performed when specimen is clear  and chemically " "negative for protein, blood, leukocyte esterase  and nitrite.         Narrative:      Indication for culture:->Acute unexplained altered mental  status ONLY after ruling out other recognized cause    COV-2, FLU A/B, AND RSV BY PCR (2-4 HOURS [facility]): Collect NP swab in VTM [[id number]] Collected: 05/24/22 1130    Order Status: Completed Specimen: Respirate Updated: 05/24/22 1243     Influenza virus A RNA Negative     Influenza virus B, PCR Negative     RSV, PCR Negative     SARS-CoV-2 by PCR NotDetected     Comment: PATIENTS: Important information regarding your results and instructions can  be found at [email or web address]   \"After your  Covid-19 Test\"    [facility] providers: PLEASE REFER TO DE-ESCALATION AND RETESTING PROTOCOL  on [email or web address]    **The [facility] GeneXpert Xpress SARS-CoV-2 RT-PCR Test has been made  available for use under the Emergency Use Authorization (EUA) only.          SARS-CoV-2 Source NP Swab    BLOOD CULTURE x2 [[id number]] Collected: 05/24/22 0000    Order Status: Canceled Specimen: Other from Peripheral           Assessment:  66 y.o. man with a history of uncontrolled type 2 diabetes mellitus, history of prior renal transplant on chronic immunosuppression, stage III chronic kidney disease, atrial fibrillation on anticoagulation, and prior right total knee arthroplasty admitted on 5/24/2022 secondary to altered mentation in the setting of a ground-level fall and hit his right shin.  Patient found to be in septic shock with gram-negative bacteremia and right lower extremity cellulitis without any evidence of necrotizing fasciitis.  His right lower extremity cellulitis appears to be improving.    On the morning of 5/26 was noted to be obtunded with respiratory distress so was intubated.    Pertinent diagnoses:  Right knee joint effusion  Persistent fevers  Septic shock, remains on pressors  Ventilatory dependent respiratory failure, intubated on " 5/26  Pneumonia  E. coli bacteremia, source possibly below  Right lower extremity cellulitis  Acute kidney injury on stage III chronic kidney disease, now on CRRT  Rhabdomyolysis  Leukocytosis, decreased  Thrombocytopenia, worse  Acute encephalopathy  Uncontrolled type 2 diabetes mellitus, hemoglobin A1c 10.7  History of right total knee arthroplasty  History of renal transplant on tacrolimus, mycophenolate and prednisone  Recent fall    Plan:  - Continue IV meropenem (started on 5/28 given new persistent fevers and worsening chest x-ray)  -Follow blood cultures on 5/24-E coli, resistant to ampicillin, intermediate resistance to Unasyn  -Diabetes education and blood sugar control  -Avoid nephrotoxins  -CSF cultures-negative  - BAL culture on 5/26-upper respiratory shaun  -Follow repeat blood cultures on 5/28- negative to date  -Continue supportive care by primary team  -Nephrology following- now on dialysis  -CT abdomen and pelvis- wall thickening from descending colon could relate to underdistention versus colitis  -Ultrasound of the right lower extremity with no stenosis or occlusion  -CT of the right lower extremity with right knee joint effusion  -Status post bedside right knee joint aspiration on 5/28.  Synovial fluid analysis reveals hazy priscila fluid, 26 3 WBCs with PMN predominance and no crystals seen.  Fluid not consistent with infection.    -Follow joint fluid cultures-pending       I have performed a physical exam and reviewed and updated ROS and plan today 5/29/2022.  In review of yesterday's note 5/28/2022, there are no changes except as documented above.      Plan of care discussed with intensivist, Dr. Swartz

## 2022-05-29 NOTE — CARE PLAN
The patient is Unstable - High likelihood or risk of patient condition declining or worsening    Shift Goals  Clinical Goals:  (Hemodynamic stability, improved neuro assessment, comfort)  Patient Goals:  (JUDITH)  Family Goals:  (stable condition)    Progress made toward(s) clinical / shift goals:  hemodynamically stable, weaning down levophed. Patient remains comfortable and is tolerating the ventilator.     Patient is not progressing towards the following goals:neuro assessment remains the same.

## 2022-05-29 NOTE — CARE PLAN
Problem: Ventilation  Goal: Ability to achieve and maintain unassisted ventilation or tolerate decreased levels of ventilator support  Description: Target End Date:  4 days     Document on Vent flowsheet    1.  Support and monitor invasive and noninvasive mechanical ventilation  2.  Monitor ventilator weaning response  3.  Perform ventilator associated pneumonia prevention interventions  4.  Manage ventilation therapy by monitoring diagnostic test results  Note:    Ventilator Daily Summary    Vent Day #3  30/530/8/30%    Ventilator settings changed this shift:RR decreased to 30, FiO2 titrated to 30%    Weaning trials: SBT x1hr. Not following for extubation parameters    Respiratory Procedures: No    Plan: Continue current ventilator settings and wean mechanical ventilation as tolerated per physician orders.

## 2022-05-29 NOTE — PROGRESS NOTES
This dialysis RN at bedside set up T5 HD machine, during machine testing, noted sudden increase in machine conductivity up to 17 and sustained at that level, also noted negative and positive pressure test failed, biomed tech notified and was advised to change machine, machine tagged and brought down to decontamination room and replaced with another HD machine, however when this new machine (P1) was just getting set up at patient's bedside, [facility] called and notified this dialysis RN that machine is set to pediatric mode/for pediatrics use only, so this dialyis RN brought machine back down to the decontamination room and waited for the next available machine to use, all other machines in  the decon room needing service maintenance and the rest of available working machines are being used at the moment. total of two hour delay and wait time.

## 2022-05-29 NOTE — PROGRESS NOTES
Results on aspiration:  RBC 5k  WBC 2k  No orgs  No crystals    A/ benign results  No evidence PJI  P/  Will follow Cx x 72 hr  No current Ortho surgical indications

## 2022-05-29 NOTE — PROGRESS NOTES
Notified Dr. Swartz that patient's arterial line is not consistently accurate (dampened, positional, and difficult to draw from). MD puckett'd levo titration and hemodialysis tolerance to be based off cuff pressures.

## 2022-05-29 NOTE — PROGRESS NOTES
[facility] Progress Note    Hemodialysis treatment x 4 hours completed as ordered per Dr. Alvares  Treatment started at 1407 and ended at 1807.      Net UF Removed: 4,500 mL     Patient tolerated treatment well with vasopressor support x 1-Levophed  14mcg/min-18mcg/min titrated by PCN during tx for BP support/to keep MAP @ 65mmHg and allow for optimal fluid removal  Patient intubated, off sedation starting 1200, baseline A Fib. on cardiac monitor HR:90s-110s  Left IJ CVC access with good patency and flow x 2 throughout tx.  See Acute HD flow sheets on clinical data notes under media for details.      Post tx access: Left IJ non tunneled triple lumen HD catheter flushed with saline then locked with heparin 1000 units/ml per designated amount in each lumen (see MAR) then clamped, capped aseptically and labeled properly. CVC dressings clean, dry, intact. No s/s of infection to HD catheter site. Heparin lock to be aspirated prior to next dialysis/CVC use by dialysis RN only. Please do not flush or draw from ports.     Please notify Nephrologist/Dialysis for follow-up.     Report given to primary care nurse LUZ MARIA Galarza RN

## 2022-05-30 ENCOUNTER — APPOINTMENT (OUTPATIENT)
Dept: RADIOLOGY | Facility: MEDICAL CENTER | Age: 67
DRG: 870 | End: 2022-05-30
Attending: INTERNAL MEDICINE
Payer: MEDICARE

## 2022-05-30 PROBLEM — D84.821 IMMUNOSUPPRESSION DUE TO CHRONIC STEROID USE (HCC): Status: ACTIVE | Noted: 2022-05-30

## 2022-05-30 PROBLEM — Z79.52 IMMUNOSUPPRESSION DUE TO CHRONIC STEROID USE (HCC): Status: ACTIVE | Noted: 2022-05-30

## 2022-05-30 PROBLEM — N18.31 ACUTE RENAL FAILURE SUPERIMPOSED ON STAGE 3A CHRONIC KIDNEY DISEASE (HCC): Status: ACTIVE | Noted: 2017-09-27

## 2022-05-30 PROBLEM — G93.41 ACUTE METABOLIC ENCEPHALOPATHY: Status: ACTIVE | Noted: 2022-05-30

## 2022-05-30 PROBLEM — T38.0X5A IMMUNOSUPPRESSION DUE TO CHRONIC STEROID USE (HCC): Status: ACTIVE | Noted: 2022-05-30

## 2022-05-30 PROBLEM — I46.9 CARDIAC ARREST (HCC): Status: ACTIVE | Noted: 2022-05-30

## 2022-05-30 PROBLEM — M25.461 KNEE EFFUSION, RIGHT: Status: ACTIVE | Noted: 2022-05-30

## 2022-05-30 LAB
ALBUMIN SERPL BCP-MCNC: 2.3 G/DL (ref 3.2–4.9)
ALBUMIN/GLOB SERPL: 0.8 G/DL
ALP SERPL-CCNC: 156 U/L (ref 30–99)
ALT SERPL-CCNC: 29 U/L (ref 2–50)
ANION GAP SERPL CALC-SCNC: 13 MMOL/L (ref 7–16)
ANION GAP SERPL CALC-SCNC: 13 MMOL/L (ref 7–16)
ANISOCYTOSIS BLD QL SMEAR: ABNORMAL
AST SERPL-CCNC: 18 U/L (ref 12–45)
BASE EXCESS BLDA CALC-SCNC: 8 MMOL/L (ref -4–3)
BASOPHILS # BLD AUTO: 0 % (ref 0–1.8)
BASOPHILS # BLD: 0 K/UL (ref 0–0.12)
BILIRUB SERPL-MCNC: 0.5 MG/DL (ref 0.1–1.5)
BODY TEMPERATURE: ABNORMAL DEGREES
BREATHS SETTING VENT: 26
BUN SERPL-MCNC: 52 MG/DL (ref 8–22)
BUN SERPL-MCNC: 58 MG/DL (ref 8–22)
BURR CELLS BLD QL SMEAR: NORMAL
CALCIUM SERPL-MCNC: 7.8 MG/DL (ref 8.5–10.5)
CALCIUM SERPL-MCNC: 8 MG/DL (ref 8.5–10.5)
CHLORIDE SERPL-SCNC: 97 MMOL/L (ref 96–112)
CHLORIDE SERPL-SCNC: 98 MMOL/L (ref 96–112)
CO2 BLDA-SCNC: 32 MMOL/L (ref 20–33)
CO2 SERPL-SCNC: 26 MMOL/L (ref 20–33)
CO2 SERPL-SCNC: 26 MMOL/L (ref 20–33)
CREAT SERPL-MCNC: 2.93 MG/DL (ref 0.5–1.4)
CREAT SERPL-MCNC: 3.2 MG/DL (ref 0.5–1.4)
CRP SERPL HS-MCNC: 15.09 MG/DL (ref 0–0.75)
DELSYS IDSYS: ABNORMAL
DIGOXIN SERPL-MCNC: 0.9 NG/ML (ref 0.8–2)
END TIDAL CARBON DIOXIDE IECO2: 30 MMHG
EOSINOPHIL # BLD AUTO: 0 K/UL (ref 0–0.51)
EOSINOPHIL NFR BLD: 0 % (ref 0–6.9)
ERYTHROCYTE [DISTWIDTH] IN BLOOD BY AUTOMATED COUNT: 49.2 FL (ref 35.9–50)
GFR SERPLBLD CREATININE-BSD FMLA CKD-EPI: 21 ML/MIN/1.73 M 2
GFR SERPLBLD CREATININE-BSD FMLA CKD-EPI: 23 ML/MIN/1.73 M 2
GLOBULIN SER CALC-MCNC: 3 G/DL (ref 1.9–3.5)
GLUCOSE BLD STRIP.AUTO-MCNC: 130 MG/DL (ref 65–99)
GLUCOSE BLD STRIP.AUTO-MCNC: 139 MG/DL (ref 65–99)
GLUCOSE BLD STRIP.AUTO-MCNC: 145 MG/DL (ref 65–99)
GLUCOSE BLD STRIP.AUTO-MCNC: 149 MG/DL (ref 65–99)
GLUCOSE BLD STRIP.AUTO-MCNC: 154 MG/DL (ref 65–99)
GLUCOSE BLD STRIP.AUTO-MCNC: 159 MG/DL (ref 65–99)
GLUCOSE BLD STRIP.AUTO-MCNC: 159 MG/DL (ref 65–99)
GLUCOSE BLD STRIP.AUTO-MCNC: 164 MG/DL (ref 65–99)
GLUCOSE BLD STRIP.AUTO-MCNC: 167 MG/DL (ref 65–99)
GLUCOSE BLD STRIP.AUTO-MCNC: 170 MG/DL (ref 65–99)
GLUCOSE BLD STRIP.AUTO-MCNC: 177 MG/DL (ref 65–99)
GLUCOSE BLD STRIP.AUTO-MCNC: 181 MG/DL (ref 65–99)
GLUCOSE BLD STRIP.AUTO-MCNC: 189 MG/DL (ref 65–99)
GLUCOSE BLD STRIP.AUTO-MCNC: 190 MG/DL (ref 65–99)
GLUCOSE BLD STRIP.AUTO-MCNC: 200 MG/DL (ref 65–99)
GLUCOSE SERPL-MCNC: 203 MG/DL (ref 65–99)
GLUCOSE SERPL-MCNC: 218 MG/DL (ref 65–99)
HCO3 BLDA-SCNC: 31.2 MMOL/L (ref 17–25)
HCT VFR BLD AUTO: 41.2 % (ref 42–52)
HGB BLD-MCNC: 13.6 G/DL (ref 14–18)
HOROWITZ INDEX BLDA+IHG-RTO: 183 MM[HG]
LYMPHOCYTES # BLD AUTO: 0.53 K/UL (ref 1–4.8)
LYMPHOCYTES NFR BLD: 2.6 % (ref 22–41)
MACROCYTES BLD QL SMEAR: ABNORMAL
MAGNESIUM SERPL-MCNC: 1.7 MG/DL (ref 1.5–2.5)
MANUAL DIFF BLD: NORMAL
MCH RBC QN AUTO: 28.9 PG (ref 27–33)
MCHC RBC AUTO-ENTMCNC: 33 G/DL (ref 33.7–35.3)
MCV RBC AUTO: 87.7 FL (ref 81.4–97.8)
MODE IMODE: ABNORMAL
MONOCYTES # BLD AUTO: 1.58 K/UL (ref 0–0.85)
MONOCYTES NFR BLD AUTO: 7.8 % (ref 0–13.4)
MORPHOLOGY BLD-IMP: NORMAL
MYELOCYTES NFR BLD MANUAL: 0.9 %
NEUTROPHILS # BLD AUTO: 18.01 K/UL (ref 1.82–7.42)
NEUTROPHILS NFR BLD: 87 % (ref 44–72)
NEUTS BAND NFR BLD MANUAL: 1.7 % (ref 0–10)
NRBC # BLD AUTO: 0.13 K/UL
NRBC BLD-RTO: 0.6 /100 WBC
O2/TOTAL GAS SETTING VFR VENT: 30 %
PCO2 BLDA: 35.9 MMHG (ref 26–37)
PCO2 TEMP ADJ BLDA: 35.1 MMHG (ref 26–37)
PEEP END EXPIRATORY PRESSURE IPEEP: 8 CMH20
PH BLDA: 7.55 [PH] (ref 7.4–7.5)
PH TEMP ADJ BLDA: 7.56 [PH] (ref 7.4–7.5)
PHOSPHATE SERPL-MCNC: 1.8 MG/DL (ref 2.5–4.5)
PHOSPHATE SERPL-MCNC: 1.8 MG/DL (ref 2.5–4.5)
PHOSPHATE SERPL-MCNC: 2.1 MG/DL (ref 2.5–4.5)
PLATELET # BLD AUTO: 49 K/UL (ref 164–446)
PLATELET BLD QL SMEAR: NORMAL
PO2 BLDA: 55 MMHG (ref 64–87)
PO2 TEMP ADJ BLDA: 54 MMHG (ref 64–87)
POIKILOCYTOSIS BLD QL SMEAR: NORMAL
POTASSIUM SERPL-SCNC: 4 MMOL/L (ref 3.6–5.5)
POTASSIUM SERPL-SCNC: 4.2 MMOL/L (ref 3.6–5.5)
PREALB SERPL-MCNC: 9.2 MG/DL (ref 18–38)
PROT SERPL-MCNC: 5.3 G/DL (ref 6–8.2)
RBC # BLD AUTO: 4.7 M/UL (ref 4.7–6.1)
RBC BLD AUTO: PRESENT
SAO2 % BLDA: 92 % (ref 93–99)
SODIUM SERPL-SCNC: 136 MMOL/L (ref 135–145)
SODIUM SERPL-SCNC: 137 MMOL/L (ref 135–145)
SPECIMEN DRAWN FROM PATIENT: ABNORMAL
TIDAL VOLUME IVT: 530 ML
WBC # BLD AUTO: 20.3 K/UL (ref 4.8–10.8)

## 2022-05-30 PROCEDURE — 84100 ASSAY OF PHOSPHORUS: CPT | Mod: 91

## 2022-05-30 PROCEDURE — A9270 NON-COVERED ITEM OR SERVICE: HCPCS | Performed by: STUDENT IN AN ORGANIZED HEALTH CARE EDUCATION/TRAINING PROGRAM

## 2022-05-30 PROCEDURE — 82962 GLUCOSE BLOOD TEST: CPT | Mod: 91

## 2022-05-30 PROCEDURE — 700101 HCHG RX REV CODE 250: Performed by: INTERNAL MEDICINE

## 2022-05-30 PROCEDURE — 700105 HCHG RX REV CODE 258: Performed by: INTERNAL MEDICINE

## 2022-05-30 PROCEDURE — 84134 ASSAY OF PREALBUMIN: CPT

## 2022-05-30 PROCEDURE — 770022 HCHG ROOM/CARE - ICU (200)

## 2022-05-30 PROCEDURE — 94003 VENT MGMT INPAT SUBQ DAY: CPT

## 2022-05-30 PROCEDURE — 90935 HEMODIALYSIS ONE EVALUATION: CPT

## 2022-05-30 PROCEDURE — 86140 C-REACTIVE PROTEIN: CPT

## 2022-05-30 PROCEDURE — A9270 NON-COVERED ITEM OR SERVICE: HCPCS | Performed by: INTERNAL MEDICINE

## 2022-05-30 PROCEDURE — 700111 HCHG RX REV CODE 636 W/ 250 OVERRIDE (IP): Performed by: INTERNAL MEDICINE

## 2022-05-30 PROCEDURE — 94150 VITAL CAPACITY TEST: CPT

## 2022-05-30 PROCEDURE — 99233 SBSQ HOSP IP/OBS HIGH 50: CPT | Performed by: INTERNAL MEDICINE

## 2022-05-30 PROCEDURE — 82803 BLOOD GASES ANY COMBINATION: CPT

## 2022-05-30 PROCEDURE — 37799 UNLISTED PX VASCULAR SURGERY: CPT

## 2022-05-30 PROCEDURE — 85007 BL SMEAR W/DIFF WBC COUNT: CPT

## 2022-05-30 PROCEDURE — 700102 HCHG RX REV CODE 250 W/ 637 OVERRIDE(OP): Performed by: INTERNAL MEDICINE

## 2022-05-30 PROCEDURE — 80162 ASSAY OF DIGOXIN TOTAL: CPT

## 2022-05-30 PROCEDURE — 99291 CRITICAL CARE FIRST HOUR: CPT | Performed by: INTERNAL MEDICINE

## 2022-05-30 PROCEDURE — 80048 BASIC METABOLIC PNL TOTAL CA: CPT

## 2022-05-30 PROCEDURE — 71045 X-RAY EXAM CHEST 1 VIEW: CPT

## 2022-05-30 PROCEDURE — 83735 ASSAY OF MAGNESIUM: CPT

## 2022-05-30 PROCEDURE — 700102 HCHG RX REV CODE 250 W/ 637 OVERRIDE(OP): Performed by: STUDENT IN AN ORGANIZED HEALTH CARE EDUCATION/TRAINING PROGRAM

## 2022-05-30 PROCEDURE — 80053 COMPREHEN METABOLIC PANEL: CPT

## 2022-05-30 PROCEDURE — 51798 US URINE CAPACITY MEASURE: CPT

## 2022-05-30 PROCEDURE — 94799 UNLISTED PULMONARY SVC/PX: CPT

## 2022-05-30 PROCEDURE — 85025 COMPLETE CBC W/AUTO DIFF WBC: CPT

## 2022-05-30 RX ORDER — MAGNESIUM SULFATE HEPTAHYDRATE 40 MG/ML
2 INJECTION, SOLUTION INTRAVENOUS ONCE
Status: COMPLETED | OUTPATIENT
Start: 2022-05-30 | End: 2022-05-30

## 2022-05-30 RX ORDER — OXYCODONE HYDROCHLORIDE 5 MG/1
5 TABLET ORAL EVERY 4 HOURS PRN
Status: DISCONTINUED | OUTPATIENT
Start: 2022-05-30 | End: 2022-06-04

## 2022-05-30 RX ADMIN — MEROPENEM 500 MG: 500 INJECTION, POWDER, FOR SOLUTION INTRAVENOUS at 18:06

## 2022-05-30 RX ADMIN — FAMOTIDINE 20 MG: 20 TABLET, FILM COATED ORAL at 05:36

## 2022-05-30 RX ADMIN — SODIUM PHOSPHATE, MONOBASIC, MONOHYDRATE AND SODIUM PHOSPHATE, DIBASIC, ANHYDROUS 15 MMOL: 276; 142 INJECTION, SOLUTION INTRAVENOUS at 09:08

## 2022-05-30 RX ADMIN — VASOPRESSIN 0.03 UNITS/MIN: 20 INJECTION, SOLUTION INTRAMUSCULAR; SUBCUTANEOUS at 08:45

## 2022-05-30 RX ADMIN — DAKIN'S SOLUTION 0.125% (QUARTER STRENGTH) 473 ML: 0.12 SOLUTION at 18:06

## 2022-05-30 RX ADMIN — DAKIN'S SOLUTION 0.125% (QUARTER STRENGTH) 473 ML: 0.12 SOLUTION at 05:36

## 2022-05-30 RX ADMIN — HEPARIN SODIUM 2800 UNITS: 1000 INJECTION, SOLUTION INTRAVENOUS; SUBCUTANEOUS at 14:10

## 2022-05-30 RX ADMIN — SENNOSIDES AND DOCUSATE SODIUM 2 TABLET: 50; 8.6 TABLET ORAL at 05:36

## 2022-05-30 RX ADMIN — POTASSIUM BICARBONATE 50 MEQ: 978 TABLET, EFFERVESCENT ORAL at 05:36

## 2022-05-30 RX ADMIN — POTASSIUM BICARBONATE 50 MEQ: 978 TABLET, EFFERVESCENT ORAL at 18:06

## 2022-05-30 RX ADMIN — SENNOSIDES AND DOCUSATE SODIUM 2 TABLET: 50; 8.6 TABLET ORAL at 18:06

## 2022-05-30 RX ADMIN — NOREPINEPHRINE BITARTRATE 2.5 MCG/MIN: 1 INJECTION, SOLUTION, CONCENTRATE INTRAVENOUS at 22:48

## 2022-05-30 RX ADMIN — MINERAL OIL, PETROLATUM 1 APPLICATION: 425; 573 OINTMENT OPHTHALMIC at 05:36

## 2022-05-30 RX ADMIN — OXYCODONE 5 MG: 5 TABLET ORAL at 11:59

## 2022-05-30 RX ADMIN — HYDROCORTISONE SODIUM SUCCINATE 50 MG: 100 INJECTION, POWDER, FOR SOLUTION INTRAMUSCULAR; INTRAVENOUS at 18:06

## 2022-05-30 RX ADMIN — HYDROCORTISONE SODIUM SUCCINATE 50 MG: 100 INJECTION, POWDER, FOR SOLUTION INTRAMUSCULAR; INTRAVENOUS at 23:53

## 2022-05-30 RX ADMIN — MAGNESIUM SULFATE HEPTAHYDRATE 2 G: 40 INJECTION, SOLUTION INTRAVENOUS at 07:21

## 2022-05-30 RX ADMIN — HYDROCORTISONE SODIUM SUCCINATE 50 MG: 100 INJECTION, POWDER, FOR SOLUTION INTRAMUSCULAR; INTRAVENOUS at 05:36

## 2022-05-30 RX ADMIN — HYDROCORTISONE SODIUM SUCCINATE 50 MG: 100 INJECTION, POWDER, FOR SOLUTION INTRAMUSCULAR; INTRAVENOUS at 00:08

## 2022-05-30 RX ADMIN — SODIUM CHLORIDE 5 UNITS/HR: 9 INJECTION, SOLUTION INTRAVENOUS at 01:14

## 2022-05-30 RX ADMIN — HYDROCORTISONE SODIUM SUCCINATE 50 MG: 100 INJECTION, POWDER, FOR SOLUTION INTRAMUSCULAR; INTRAVENOUS at 11:59

## 2022-05-30 ASSESSMENT — PAIN DESCRIPTION - PAIN TYPE
TYPE: ACUTE PAIN

## 2022-05-30 ASSESSMENT — PULMONARY FUNCTION TESTS
FVC: 1.6
FVC: 0

## 2022-05-30 ASSESSMENT — PATIENT HEALTH QUESTIONNAIRE - PHQ9
1. LITTLE INTEREST OR PLEASURE IN DOING THINGS: NOT AT ALL
2. FEELING DOWN, DEPRESSED, IRRITABLE, OR HOPELESS: NOT AT ALL
SUM OF ALL RESPONSES TO PHQ9 QUESTIONS 1 AND 2: 0

## 2022-05-30 ASSESSMENT — ENCOUNTER SYMPTOMS: FEVER: 0

## 2022-05-30 ASSESSMENT — FIBROSIS 4 INDEX: FIB4 SCORE: 4.43

## 2022-05-30 NOTE — CARE PLAN
The patient is Watcher - Medium risk of patient condition declining or worsening    Shift Goals  Clinical Goals: maintain MAP > 65, titrate down on Levo as tollerated  Patient Goals: JUDITH  Family Goals: updates    Progress made toward(s) clinical / shift goals:  yes      Problem: Knowledge Deficit - Standard  Goal: Patient and family/care givers will demonstrate understanding of plan of care, disease process/condition, diagnostic tests and medications  Outcome: Progressing     Problem: Pain - Standard  Goal: Alleviation of pain or a reduction in pain to the patient’s comfort goal  Outcome: Progressing

## 2022-05-30 NOTE — ASSESSMENT & PLAN NOTE
Acute on chronic secondary to sepsis  Holding systemic anticoagulation for DVT prophylaxis  Thrombocytopenia improving once back to baseline may resume DVT prophylaxis  Monitor for bleeding, daily CBC

## 2022-05-30 NOTE — CARE PLAN
The patient is Watcher - Medium risk of patient condition declining or worsening    Shift Goals  Clinical Goals:  (Hemodynamic stability, improved neuro assessment, comfort)  Patient Goals:  (JUDITH)  Family Goals:  (stable condition)    Progress made toward(s) clinical / shift goals:  hemodynamically stable, improved neuro    Patient is not progressing towards the following goals:n/a      Problem: Knowledge Deficit - Standard  Goal: Patient and family/care givers will demonstrate understanding of plan of care, disease process/condition, diagnostic tests and medications  Outcome: Progressing     Problem: Pain - Standard  Goal: Alleviation of pain or a reduction in pain to the patient’s comfort goal  Outcome: Progressing     Problem: Skin Integrity  Goal: Skin integrity is maintained or improved  Outcome: Progressing     Problem: Fall Risk  Goal: Patient will remain free from falls  Outcome: Progressing     Problem: Safety - Medical Restraint  Goal: Remains free of injury from restraints (Restraint for Interference with Medical Device)  Outcome: Progressing

## 2022-05-30 NOTE — FLOWSHEET NOTE
05/30/22 1634   Spontaneous Breathing Trial (SBT)   Spontaneous breathing trial (SBT) outcome Pt weaned for 1 hour and returned to rest settings per protocol - SBT Pass   Length of Weaning Trial (Hours) 1   Pt passed SBT but not ready to extubate Not ready - continue daily assessments for extubation   Weaning Parameters   RR (bpm) 20   $ FVC / Vital Capacity (liters)  1.6   NIF (cm H2O)  -18   Rapid Shallow Breathing Index (RR/VT) 35   Spontaneous VE 17   Spontaneous

## 2022-05-30 NOTE — ASSESSMENT & PLAN NOTE
Slowly restarting home immunosuppression per guidance from nephrology  Working on tapering off hydrocortisone while reintroducing home prednisone

## 2022-05-30 NOTE — ASSESSMENT & PLAN NOTE
With most recent ejection fraction 35%  Fluid management  Unable to tolerate beta-blocker or ACE inhibitor at this time given shock and renal failure

## 2022-05-30 NOTE — ASSESSMENT & PLAN NOTE
Intubated 5/26.  Extubated May 31   RT/O2 protocol  Limit sedatives, add oxycodone as needed pain  Dialysis for fluid removal

## 2022-05-30 NOTE — PROGRESS NOTES
2 RN skin check completed with [place]    Areas of concern/Skin observations:  · Bruising left forehead with small abrasion  · Bruising bilateral flank, left knee, left top of foot  · Skin tears to RLE with darkening periwound-cleansed, left open to air  · Right anterior wound-cleansed, packed with dakin's soaked packing strip, non-adherent foam placed, hypafix deferred due to skin tears surrounding wound  · Sacrum is red and blanching, rectal tube in place-cleansed, barrier paste applied    Devices in use, assessed under and interventions (as appropriate) for skin protection:  · SCDs, BP cuff, SaO2 monitor, ETT, rectal tube    Interventions in place such as:   · q2 hour turns  · Keeping skin clean and dry  · Use of products such as barrier wipes/cream  · Waffle cushion while OOB: N/A  · Bed Type: pressure redistribution mattress  · Mobility: bedrest  · Rotating ET tube q2h (in coordination with RTs): yes

## 2022-05-30 NOTE — PROGRESS NOTES
Infectious Disease Progress Note    Author: Tania Briggs M.D. Date & Time of service: 2022  1:27 PM    Chief Complaint:  Follow-up for septic shock, E. coli bacteremia, right lower extremity cellulitis    Interval History:   afebrile, WBC 24.5, blood cultures now growing E. Coli, meningitis CSF PCR panel not detected.  Patient remains on pressors.  Patient was noted to be obtunded with respiratory distress early this morning and has not now been intubated.  Renal function worse today.  Daughter and girlfriend at bedside with questions   T-max 99.7 WBC 26.8, remains on multiple pressors with increasing requirements overnight, remains on the vent however oxygenation requirements improving, renal function improving on CRRT   T-max 101.1 WBC 28.9.  Patient developed PEA arrest and underwent CPR yesterday.  White count worsening.  Remains on pressors.  Chest x-ray worse this morning.  CT of the right lower extremity shows right knee joint effusion and circumferential edema involving the soft tissues of the right lower leg.  CT abdomen and pelvis some wall thickening in the descending colon related to underdistention or colitis.  Remains sedated.  Ortho PA bedside performing right knee joint aspiration-fluid is cloudy yellow   afebrile, WBC 20.6 joint aspiration fluid not consistent with infection, remains on the vent however FiO2 decreased to 30%.  Remains on pressors but decreasing requirements.  Sedated   AF WBC 20.3 getting dialysis. Remains intubated on pressors    Labs Reviewed, Medications Reviewed and Wound Reviewed.    Review of Systems:  Review of Systems   Unable to perform ROS: Intubated   Constitutional: Negative for fever.       Hemodynamics:  Temp (24hrs), Av.4 °C (97.5 °F), Min:35.9 °C (96.7 °F), Max:36.6 °C (97.9 °F)  Temperature: 36.5 °C (97.7 °F)  Pulse  Av.9  Min: 43  Max: 159   Blood Pressure : 121/66, Arterial BP: 122/64       Physical Exam:  Physical  Exam  Vitals and nursing note reviewed.   Constitutional:       Appearance: He is ill-appearing. He is not diaphoretic.   HENT:      Nose: No rhinorrhea.      Mouth/Throat:      Comments: ET tube  Eyes:      General:         Right eye: No discharge.         Left eye: No discharge.      Comments: Eyes closed   Neck:      Comments: Right IJ catheter    Left IJ HD catheter  Cardiovascular:      Rate and Rhythm: Tachycardia present.   Pulmonary:      Effort: Pulmonary effort is normal. No respiratory distress.   Abdominal:      General: There is no distension.      Palpations: Abdomen is soft.   Musculoskeletal:      Right lower leg: Edema present.      Comments: Right knee surgical scar    Open wound on the anterior aspect of the right lower extremity dressed and packed      Bilateral feet cool to the touch with mottling, right greater than left    Blister formation on right foot and leg    Right knee swelling   Skin:     General: Skin is warm and dry.   Neurological:      Comments: Intubated and sedated   Psychiatric:      Comments: Unable to assess as patient intubated and sedated         Meds:    Current Facility-Administered Medications:   •  vasopressin (PITRESSIN) infusion  •  oxyCODONE immediate-release  •  sodium phosphate ivpb  •  potassium bicarbonate  •  insulin regular 100 units in 100 mL 0.9% NaCl infusion  •  dextrose bolus  •  norepinephrine (Levophed) infusion  •  meropenem  •  artificial tears  •  heparin  •  acetaminophen  •  Pharmacy  •  Respiratory Therapy Consult  •  famotidine **OR** famotidine  •  senna-docusate **AND** polyethylene glycol/lytes **AND** magnesium hydroxide **AND** bisacodyl  •  lidocaine  •  sodium chloride  •  dakins 0.125% (1/4 strength)  •  hydrocortisone sodium succinate PF  •  HYDROmorphone    Labs:  Recent Labs     05/28/22  0309 05/29/22  0420 05/30/22  0505   WBC 28.9* 20.6* 20.3*   RBC 4.94 4.85 4.70   HEMOGLOBIN 14.3 14.2 13.6*   HEMATOCRIT 43.3 42.1 41.2*   MCV 87.7  86.8 87.7   MCH 28.9 29.3 28.9   RDW 48.7 47.5 49.2   PLATELETCT 58* 50* 49*   NEUTSPOLYS 95.60* 90.40* 87.00*   LYMPHOCYTES 0.00* 0.00* 2.60*   MONOCYTES 3.50 4.40 7.80   EOSINOPHILS 0.00 0.00 0.00   BASOPHILS 0.00 0.00 0.00   RBCMORPHOLO Present Present Present     Recent Labs     05/29/22 2056 05/30/22  0043 05/30/22  0505   SODIUM 136 136 137   POTASSIUM 3.6 4.2 4.0   CHLORIDE 98 97 98   CO2 25 26 26   GLUCOSE 163* 203* 218*   BUN 45* 52* 58*     Recent Labs     05/28/22  1445 05/28/22 2130 05/29/22 2056 05/30/22 0043 05/30/22  0505   ALBUMIN 2.3*  --   --  2.3*  --    TBILIRUBIN 0.8  --   --  0.5  --    ALKPHOSPHAT 128*  --   --  156*  --    TOTPROTEIN 5.4*  --   --  5.3*  --    ALTSGPT 29  --   --  29  --    ASTSGOT 27  --   --  18  --    CREATININE 2.58*   < > 2.72* 2.93* 3.20*    < > = values in this interval not displayed.       Imaging:  CT-CSPINE WITHOUT PLUS RECONS    Result Date: 5/24/2022 5/24/2022 11:38 AM HISTORY/REASON FOR EXAM: Fall and neck pain TECHNIQUE/EXAM DESCRIPTION: CT cervical spine without contrast, with reconstructions. The study was performed on a helical multidetector CT scanner. Thin-section helical scanning was performed from the skull base through T1. Sagittal and coronal multiplanar reconstructions were generated from the axial images. Low dose optimization technique was utilized for this CT exam including automated exposure control and adjustment of the mA and/or kV according to patient size. COMPARISON:  None. FINDINGS: Alignment in the cervical spine is normal. There is no fracture or dislocation. The craniovertebral junction appears intact. The prevertebral and paraspinous soft tissues are unremarkable. There is moderate disc space. C6-7 level with marginal osteophytosis and moderate posterior spurring. The superior mediastinum and lung apices in the field of view are unremarkable.     1.  Moderate osteoarthritic changes at the C6-7 level with disc space narrowing and  marginal osteophytosis. Further there is moderate cervical spondylotic changes at this level. 2.  No evidence of cervical spine fracture and/or subluxation.    CT-ABDOMEN-PELVIS WITH    Result Date: 5/24/2022 5/24/2022 11:39 AM HISTORY/REASON FOR EXAM:  trauma red. TECHNIQUE/EXAM DESCRIPTION:   CT scan of the abdomen and pelvis with contrast. Contrast-enhanced helical scanning was obtained from the diaphragmatic domes through the pubic symphysis following the bolus administration of nonionic contrast without complication. 75 mL of Omnipaque 350 nonionic contrast was administered without complication. Low dose optimization technique was utilized for this CT exam including automated exposure control and adjustment of the mA and/or kV according to patient size. COMPARISON: Complete abdominal sonogram, 9/14/2021. FINDINGS: Lower Chest: Dependent atelectasis in the lung bases. No pleural effusion or pneumothorax. No pericardial effusion. Liver: Normal. Spleen: Unremarkable. Pancreas: Unremarkable. Gallbladder: No calcified stones. Biliary: Nondilated. Adrenal glands: Normal. Kidneys: Absent right kidney. Right lower quadrant transplant kidney without hydronephrosis. Enlarged left kidney with 2 numerous to count fluid attenuation lesions throughout the cortex. Nonobstructing stones in the lower pole of the left kidney. No hydronephrosis or hydroureter. Bowel: Sigmoid colon diverticulosis, without diverticulitis. Normal appendix. Lymph nodes: No adenopathy. Vasculature: Calcified atherosclerotic plaques in the aorta and iliac arteries, without aneurysmal dilation. Peritoneum: Unremarkable without ascites. Musculoskeletal: No acute or destructive process. Multilevel lumbar spondylosis. Pelvis: The bladder is decompressed by an indwelling Epps catheter.. Small umbilical hernia containing fat.     1. No acute posttraumatic findings in the abdomen or pelvis. 2. Bibasilar subsegmental atelectasis. 3. Right pelvic transplant  kidney without hydronephrosis. 4. Polycystic left kidney. 5. Diverticulosis without diverticulitis. Normal appendix.    CT-EXTREMITY, LOWER W/O RIGHT    Result Date: 5/24/2022 5/24/2022 4:17 PM HISTORY/REASON FOR EXAM:  Soft tissue infection suspected, lower leg, no prior imaging; necrotizing fasciitis to right lower ext, recieved large amounts of contrast already with renal transplant. TECHNIQUE/EXAM DESCRIPTION AND NUMBER OF VIEWS: CT scan of the RIGHT lower extremity without contrast and including reconstructions. Thin-section noncontrast helical images were obtained. Coronal and sagittal reconstructions were generated from the axial images. Up to date radiation dose reduction adjustments have been utilized to meet ALARA standards for radiation dose reduction. COMPARISON: None. FINDINGS: Right total knee replacement. There is decreased bony mineralization of the right lower extremity. There is no evidence of ulceration or soft tissue mass in the right lower extremity. There is no evidence of large abscess formation. There is diffuse atherosclerotic calcification of the right superficial femoral artery. There are curvilinear regions of soft tissue attenuation throughout the subcutaneous tissues of the right mid and distal lower leg.     1.  Status post right total knee replacement. 2.  Diffuse atherosclerotic calcification of the arterial system of the right lower extremity suspicious for diabetes mellitus. 3.  Soft tissue attenuation in the subcutaneous tissues of the mid to distal lower leg and foot suspicious for cellulitis. 4.  No evidence of soft tissue ulceration in the right lower leg or foot. 5.  No evidence of acute osteomyelitis in the right lower leg or foot. 6.  Small subcutaneous abscesses cannot be excluded without the use of intravenous contrast.    CT-HEAD W/O    Result Date: 5/24/2022 5/24/2022 11:38 AM HISTORY/REASON FOR EXAM:  trauma red. TECHNIQUE/EXAM DESCRIPTION AND NUMBER OF VIEWS: CT of  the head without contrast. The study was performed on a helical multidetector CT scanner. Contiguous axial sections were obtained from the skull base through the vertex. Up to date radiation dose reduction adjustments have been utilized to meet ALARA standards for radiation dose reduction. COMPARISON:  None available FINDINGS: The calvariae and skull base are unremarkable. There are no extraaxial fluid collections. There is a pattern of cerebral atrophy manifest as enlargement of sulcal markings and ventricular prominence. The ventricular system and basal cisterns are otherwise unremarkable. There are no areas of abnormal density in the brain substance. There are no hemorrhagic lesions. There are no mass effects or shift of midline structures. The brainstem and posterior fossa structures are unremarkable. Paranasal sinuses in the field of view are unremarkable. Mastoids in the field of view are unremarkable.     1.  Cerebral atrophy. 2.  Otherwise, Head CT without contrast within normal limits. No evidence of acute cerebral infarction, hemorrhage or mass lesion.     DX-CHEST-LIMITED (1 VIEW)    Result Date: 5/25/2022 5/25/2022 7:52 AM HISTORY/REASON FOR EXAM:  Central line placement TECHNIQUE/EXAM DESCRIPTION AND NUMBER OF VIEWS: Single portable view of the chest. COMPARISON: Chest radiography, 5/25/2022 at 0719 hours FINDINGS: Lungs: Symmetric low lung volumes. Stable linear and patchy opacities are unchanged. Stable small left pleural effusion. The right costophrenic recess is sharp. No visible pneumothorax bilaterally. Mediastinum: Cardiac silhouette size remains enlarged. Other: The right internal jugular central venous access catheter placed in the interval terminates over the right atrium. The remaining visualized bones and soft tissues are stable radiographically.     1. Right internal jugular central venous access catheter placed in the interval terminates over the upper aspect of the right atrium. No  postprocedure visible pneumothorax. 2. Stable patchy parenchymal opacities in the lungs, with a stable small left pleural effusion.    DX-CHEST-LIMITED (1 VIEW)    Result Date: 5/24/2022 5/24/2022 11:26 AM HISTORY/REASON FOR EXAM:  Chest Pain. TECHNIQUE/EXAM DESCRIPTION AND NUMBER OF VIEWS: Single AP view of the chest. COMPARISON:  Chest x-ray 11/13/2020 FINDINGS: Lungs:  There are curvilinear opacities in the lung fields bilaterally most pronounced in the perihilar regions. Pleura:  There is no pleural effusion or pneumothorax. Heart and mediastinum:  The heart silhouette is mildly enlarged Bones:  Normal     1.  Curvilinear perihilar opacities likely atelectasis and/or parenchymal scarring. 2.  Mild cardiomegaly.    DX-CHEST-PORTABLE (1 VIEW)    Result Date: 5/25/2022 5/25/2022 7:12 AM HISTORY/REASON FOR EXAM: Shortness of Breath TECHNIQUE/EXAM DESCRIPTION:  Single AP view of the chest. COMPARISON: Yesterday FINDINGS: Cardiomegaly is observed. The mediastinal contour appears within normal limits.  The central  pulmonary vasculature appears prominent and indistinct. Bilateral lung volumes are diminished.  Diffuse scattered hazy pulmonary parenchymal opacities are seen. Blunting of bilateral costophrenic angles is seen, compatible with trace bilateral pleural effusions. The bony structures appear age-appropriate.     1.  Pulmonary edema and/or infiltrates are identified, which are stable since the prior exam. 2.  Trace bilateral pleural effusions 3.  Cardiomegaly    DX-CHEST-PORTABLE (1 VIEW)    Result Date: 5/24/2022 5/24/2022 1:00 PM HISTORY/REASON FOR EXAM:  Central line placement. TECHNIQUE/EXAM DESCRIPTION AND NUMBER OF VIEWS: Single portable view of the chest. COMPARISON: Chest radiograph, 5/24/2022 at 1147 hours FINDINGS: Lungs: Stable curvilinear opacities in the lung fields, greatest in the perihilar regions, stable when compared to prior study. Stable bibasal parenchymal volume loss. No large volume  pleural effusion or visible pneumothorax. No pulmonary vascular congestion or interstitial edema. Mediastinum: The cardiac silhouette size remains enlarged. Other: The left internal jugular central venous access catheter placed in the interval terminates over a persistent left superior vena cava. The remaining visualized bones and soft tissues are stable radiographically.     Left internal jugular central venous access catheter terminates over a persistent left superior vena cava. No postprocedure visible pneumothorax. The remainder is stable.    DX-PELVIS-1 OR 2 VIEWS    Result Date: 5/24/2022 5/24/2022 11:27 AM HISTORY/REASON FOR EXAM:  Pelvic/Hip Pain Following Trauma. TECHNIQUE/EXAM DESCRIPTION AND NUMBER OF VIEWS:  1 view(s) of the pelvis. COMPARISON:  None. FINDINGS: There is normal bony mineralization of the pelvis. There is no evidence of pelvic fracture or osseous lesion. The sacroiliac joints and pubic symphysis are symmetrical.     1.  Unremarkable single AP view of the pelvis.    DX-TIBIA AND FIBULA RIGHT    Result Date: 5/24/2022 5/24/2022 12:17 PM HISTORY/REASON FOR EXAM:  Pain/Deformity Following Trauma. TECHNIQUE/EXAM DESCRIPTION AND NUMBER OF VIEWS:  2 views of the RIGHT tibia and fibula. COMPARISON: Right knee radiography, 12/18/2006 FINDINGS: Bones: Postsurgical changes in the distal right femur and proximal right tibia. Normal bone mineralization. No focal bone lesion. No acute fracture. Joint: Postsurgical changes of right total knee arthroplasty. Ankle mortise is preserved. No subluxation. Soft tissue: Arteriosclerosis. Circumferential right lower leg soft tissue swelling.     1. Right lower leg soft tissue swelling. 2. No acute fracture or subluxation. 3. Postsurgical changes of right total knee arthroplasty.    CT-CTA CHEST PULMONARY ARTERY W/ RECONS    Result Date: 5/24/2022 5/24/2022 11:38 AM HISTORY/REASON FOR EXAM:  Fall and chest pain TECHNIQUE/EXAM DESCRIPTION: CT angiogram scan for  pulmonary embolism with contrast, with reconstructions. 1.25 mm helical sections were obtained from the lung apices through the lung bases following the rapid bolus administration of 75 mL of Omnipaque 350 nonionic contrast. Thin-section overlapping reconstruction interval was utilized. Coronal reconstructions were generated from the axial data. MIP post processing was performed and utilized for the interpretation. Low dose optimization technique was utilized for this CT exam including automated exposure control and adjustment of the mA and/or kV according to patient size. COMPARISON: None FINDINGS: Pulmonary Embolism: No. Main Pulmonary Arteries: No. Segmental branches: No. Subsegmental branches: No. Additional Comments: None. Lungs: There are curvilinear opacities dependently in the lung bases. Pleura: No pleural effusion. Nodes: No enlarged lymph nodes. Additional findings: Diffuse coronary artery calcification.     1.  No CT evidence of pulmonary emboli. 2.  Bibasilar atelectasis. 3.  No evidence of rib fracture. 4. Diffuse coronary artery calcification.    EC-ECHOCARDIOGRAM COMPLETE W/ CONT    Result Date: 2022  Transthoracic Echo Report Echocardiography Laboratory CONCLUSIONS No prior study is available for comparison. The left ventricular ejection fraction is visually estimated to be 35%. Unable to estimate pulmonary artery pressure due to an inadequate tricuspid regurgitant jet. CIARA FORRESTER Exam Date:         2022                    11:35 Exam Location:     Inpatient Priority:          Routine Ordering Physician:        ALEJANDRA FROST Referring Physician: Sonographer:               Jack Lockwood RDCS, RVT Age:    66     Gender:    M MRN:    [id number] :    1955 BSA:    2.4    Ht (in):    77     Wt (lb):    235 Exam Type:     Complete Indications:     Shortness of breath ICD Codes:       786.05 CPT Codes:       85045 BP:   90     /   51     HR:   97 Technical  Quality:       Technically difficult study -                          adequate information is obtained MEASUREMENTS  (Male / Female) Normal Values 2D ECHO LV Diastolic Diameter PLAX        4.6 cm                4.2 - 5.9 / 3.9 - 5.3 cm LV Systolic Diameter PLAX         4.1 cm                2.1 - 4.0 cm IVS Diastolic Thickness           1.2 cm                LVPW Diastolic Thickness          1.2 cm                LVOT Diameter                     2 cm                  Estimated LV Ejection Fraction    35 %                  LV Ejection Fraction MOD BP       38.5 %                >= 55  % LV Ejection Fraction MOD 4C       52.3 %                LV Ejection Fraction MOD 2C       40.7 %                IVC Diameter                      1.8 cm                DOPPLER AV Peak Velocity                  1.5 m/s               AV Peak Gradient                  9.5 mmHg              AV Mean Gradient                  5.7 mmHg              LVOT Peak Velocity                0.8 m/s               AV Area Cont Eq vti               1.6 cm2               MV Velocity Time Integral         13.5 cm               Mitral E Point Velocity           0.63 m/s              Mitral E to A Ratio               1.1                   MV Pressure Half Time             62.8 ms               MV Area PHT                       3.5 cm2               MV Deceleration Time              217 ms                TR Peak Velocity                  275 cm/s              PV Peak Velocity                  1.1 m/s               PV Peak Gradient                  4.6 mmHg              RVOT Peak Velocity                0.79 m/s              * Indicates values subject to auto-interpretation LV EF:  35    % FINDINGS Left Ventricle 3 ml of  contrast was used to evaluate for thrombus in the left ventricular apex. Normal left ventricular chamber size. Mild concentric left ventricular hypertrophy. Moderately reduced left ventricular systolic function. The left ventricular  ejection fraction is visually estimated to be 35%.There is  regional wall motion abnormalities may be from old MI.indeterminate diastolic function. Right Ventricle Normal right ventricular size. Reduced right ventricular systolic function. Right Atrium Enlarged right atrium. Normal inferior vena cava size and inspiratory collapse. Left Atrium Normal left atrial size. Left atrial volume index is 21  mL/sq m. Mitral Valve The mitral valve is not well visualized. No mitral stenosis. Trace mitral regurgitation. Aortic Valve The aortic valve is not well visualized. No aortic valve stenosis. No aortic insufficiency. Tricuspid Valve The tricuspid valve is not well visualized. No tricuspid stenosis. Trace tricuspid regurgitation. Unable to estimate pulmonary artery pressure due to an inadequate tricuspid regurgitant jet. Pulmonic Valve The pulmonic valve is not well visualized. No pulmonic stenosis. Trace pulmonic insufficiency. Pericardium Normal pericardium without effusion. Aorta The ascending aorta diameter is 3.2  cm. Matthew Yoder MD (Electronically Signed) Final Date:     25 May 2022 14:37    [facility] (FOR ED USE ONLY)    Result Date: 5/24/2022 5/24/2022 12:01 PM HISTORY/REASON FOR EXAM:  Ground-level fall with traumatic injury TECHNIQUE/EXAM DESCRIPTION AND NUMBER OF VIEWS:  Limited ultrasound of the abdomen. FAST scan. Focused ultrasound of the 4 quadrants of the abdomen. COMPARISON: None FINDINGS: Focused ultrasound of the 4 quadrants of the abdomen demonstrates no evidence of free fluid in the 4 quadrants.     No free fluid seen in all 4 quadrants. Negative FAST scan.       Micro:  Results     Procedure Component Value Units Date/Time    FLUID CULTURE W/GRAM STAIN [[id number]] Collected: 05/28/22 1000    Order Status: Completed Specimen: Synovial Fluid Updated: 05/30/22 0924     Significant Indicator NEG     Source SYNO     Site SYNOVIAL FLUID     Culture Result No growth at 48 hours.      "Gram Stain Result Few WBCs.  No organisms seen.      Narrative:      Collected By: [id number] JIM BEAN  Right knee  Collected By: [id number] JIM BEAN    BLOOD CULTURE [[id number]] Collected: 05/28/22 0800    Order Status: Completed Specimen: Blood from Peripheral Updated: 05/29/22 0707     Significant Indicator NEG     Source BLD     Site PERIPHERAL     Culture Result No Growth  Note: Blood cultures are incubated for 5 days and  are monitored continuously.Positive blood cultures  are called to the RN and reported as soon as  they are identified.      Narrative:      Per Hospital Policy: Only change Specimen Src: to \"Line\" if  specified by physician order.  Right Forearm/Arm    BLOOD CULTURE [[id number]] Collected: 05/28/22 0850    Order Status: Completed Specimen: Blood from Peripheral Updated: 05/29/22 0707     Significant Indicator NEG     Source BLD     Site PERIPHERAL     Culture Result No Growth  Note: Blood cultures are incubated for 5 days and  are monitored continuously.Positive blood cultures  are called to the RN and reported as soon as  they are identified.      Narrative:      Per Hospital Policy: Only change Specimen Src: to \"Line\" if  specified by physician order.  Right Forearm/Arm    GRAM STAIN [[id number]] Collected: 05/28/22 1000    Order Status: Completed Specimen: Synovial Updated: 05/28/22 1210     Significant Indicator .     Source SYNO     Site SYNOVIAL FLUID     Gram Stain Result Few WBCs.  No organisms seen.      Narrative:      Collected By: [id number] JIM BEAN  Right knee  Collected By: [id number] JIM BEAN    QUANT BRONCHIAL WASHING [[id number]] Collected: 05/26/22 0944    Order Status: Completed Specimen: Respirate Updated: 05/28/22 0958     Significant Indicator NEG     Source RESP     Site BRONCHOALVEOLAR LAVAGE     Culture Result 4,200 cfu/mL usual upper respiratory shaun  including yeast.  No clinically significant Staphylococcus aureus, " "Methicillin  Resistant Staphylococcus aureus, or Pseudomonas species  isolated.       Gram Stain Result Moderate WBCs.  Few Gram positive cocci.  <5% intracellular organisms.      Narrative:      Collected By: [id number] SANTOSH BUENO.  Collected By: [id number] SANTOSH BUENO.    CSF CULTURE [[id number]] Collected: 05/24/22 1325    Order Status: Completed Specimen: CSF from Tap Updated: 05/27/22 0946     Significant Indicator NEG     Source CSF     Site TAP     Culture Result No growth at 72 hours.     Gram Stain Result Rare WBCs.  No organisms seen.      GRAM STAIN [[id number]] Collected: 05/26/22 0944    Order Status: Completed Specimen: Respirate Updated: 05/26/22 1847     Significant Indicator .     Source RESP     Site BRONCHOALVEOLAR LAVAGE     Gram Stain Result Moderate WBCs.  Few Gram positive cocci.  <5% intracellular organisms.      Narrative:      Collected By: [id number] SANTOSH BUENO.  Collected By: [id number] SANTOSH BUENO.    Culture Respiratory W/ Grm Stn - BAL [[id number]]     Order Status: Completed Specimen: Respirate from Bronchoalveolar Lavage     BLOOD CULTURE x2 [[id number]]  (Abnormal) Collected: 05/24/22 1122    Order Status: Completed Specimen: Blood from Peripheral Updated: 05/26/22 0952     Significant Indicator POS     Source BLD     Site PERIPHERAL     Culture Result Growth detected by Bactec instrument. 05/24/2022  23:30      Escherichia coli  See previous culture for sensitivity report.      Narrative:      CALL  [place]  19 tel. [phone],  CALLED  19 tel. [phone] 05/24/2022, 23:31, RB PERF. RESULTS CALLED TO: RN  [id number]  Per Hospital Policy: Only change Specimen Src: to \"Line\" if  specified by physician order.  Left Hand    BLOOD CULTURE x2 [[id number]]  (Abnormal)  (Susceptibility) Collected: 05/24/22 1124    Order Status: Completed Specimen: Blood from Peripheral Updated: 05/26/22 0949     Significant Indicator POS     Source BLD     Site PERIPHERAL     Culture Result Growth detected by Bactec " "instrument. 05/24/2022  23:30      Escherichia coli    Narrative:      CALL  [facility]  19 tel. [phone],  CALLED  19 tel. [phone] 05/24/2022, 23:31, RB PERF. RESULTS CALLED TO:  [id number]  Per Hospital Policy: Only change Specimen Src: to \"Line\" if  specified by physician order.  Right Hand    Susceptibility     Escherichia coli (1)     Antibiotic Interpretation Microscan   Method Status    Ampicillin Resistant >16 mcg/mL CESIA Final    Ceftriaxone Sensitive <=1 mcg/mL CESIA Final    Cefazolin Sensitive <=2 mcg/mL CESIA Final    Ciprofloxacin Sensitive <=0.25 mcg/mL CESIA Final    Cefepime Sensitive <=2 mcg/mL CESIA Final    Cefuroxime Sensitive <=4 mcg/mL CESIA Final    Ampicillin/sulbactam Intermediate 16/8 mcg/mL CESIA Final    Ertapenem Sensitive <=0.5 mcg/mL CESIA Final    Tobramycin Sensitive <=2 mcg/mL CESIA Final    Gentamicin Sensitive <=2 mcg/mL CESIA Final    Minocycline Sensitive <=4 mcg/mL CESIA Final    Moxifloxacin Sensitive <=2 mcg/mL CESIA Final    Pip/Tazobactam Sensitive <=8 mcg/mL CESIA Final    Trimeth/Sulfa Resistant >2/38 mcg/mL CESIA Final    Tigecycline Sensitive <=2 mcg/mL CESIA Final                   RESP CULTURE [[id number]] Collected: 05/26/22 0944    Order Status: Canceled Specimen: Other from Bronchoalveolar Lavage     GRAM STAIN [[id number]] Collected: 05/24/22 1325    Order Status: Completed Specimen: CSF Updated: 05/24/22 1545     Significant Indicator .     Source CSF     Site TAP     Gram Stain Result Rare WBCs.  No organisms seen.      CULTURE WOUND W/ GRAM STAIN [[id number]]     Order Status: Canceled Specimen: Wound from Right Leg     URINALYSIS,CULTURE IF INDICATED [[id number]]  (Abnormal) Collected: 05/24/22 1245    Order Status: Sent Specimen: Urine, Cath Updated: 05/24/22 1323     Color Yellow     Character Clear     Specific Gravity 1.017     Ph 5.0     Glucose 500 mg/dL      Ketones Negative mg/dL      Protein Negative mg/dL      Bilirubin Negative     Urobilinogen, Urine 0.2     Nitrite Negative     " "Leukocyte Esterase Negative     Occult Blood Negative     Micro Urine Req see below     Comment: Microscopic examination not performed when specimen is clear  and chemically negative for protein, blood, leukocyte esterase  and nitrite.         Narrative:      Indication for culture:->Acute unexplained altered mental  status ONLY after ruling out other recognized cause    COV-2, FLU A/B, AND RSV BY PCR (2-4 HOURS CEPHEID): Collect NP swab in VTM [[id number]] Collected: 05/24/22 1130    Order Status: Completed Specimen: Respirate Updated: 05/24/22 1243     Influenza virus A RNA Negative     Influenza virus B, PCR Negative     RSV, PCR Negative     SARS-CoV-2 by PCR NotDetected     Comment: PATIENTS: Important information regarding your results and instructions can  be found at [email or web address]   \"After your  Covid-19 Test\"    [facility] providers: PLEASE REFER TO DE-ESCALATION AND RETESTING PROTOCOL  on [email or web address]    **The [employer] GeneXpert Xpress SARS-CoV-2 RT-PCR Test has been made  available for use under the Emergency Use Authorization (EUA) only.          SARS-CoV-2 Source NP Swab    BLOOD CULTURE x2 [[id number]] Collected: 05/24/22 0000    Order Status: Canceled Specimen: Other from Peripheral           Assessment:  66 y.o. man with a history of uncontrolled type 2 diabetes mellitus, history of prior renal transplant on chronic immunosuppression, stage III chronic kidney disease, atrial fibrillation on anticoagulation, and prior right total knee arthroplasty admitted on 5/24/2022 secondary to altered mentation in the setting of a ground-level fall and hit his right shin.  Patient found to be in septic shock with gram-negative bacteremia and right lower extremity cellulitis without any evidence of necrotizing fasciitis.  His right lower extremity cellulitis appears to be improving.    On the morning of 5/26 was noted to be obtunded with respiratory distress so was " intubated.    Pertinent diagnoses:  Right knee joint effusion  Persistent fevers  Septic shock, remains on pressors  Ventilatory dependent respiratory failure, intubated on 5/26  Pneumonia  E. coli bacteremia, source possibly below  Right lower extremity cellulitis  Acute kidney injury on stage III chronic kidney disease, now on CRRT  Rhabdomyolysis  Leukocytosis, decreased  Thrombocytopenia, worse  Acute encephalopathy  Uncontrolled type 2 diabetes mellitus, hemoglobin A1c 10.7  History of right total knee arthroplasty  History of renal transplant on tacrolimus, mycophenolate and prednisone  Recent fall    Plan:    -Follow blood cultures on 5/24-E coli, resistant to ampicillin, intermediate resistance to Unasyn  -Repeat blood cultures on 5/28- negative to date  -Avoid nephrotoxins  -CSF cultures-negative  - BAL culture on 5/26-upper respiratory shaun  -Continue supportive care by primary team  -Nephrology following- now on dialysis  -CT abdomen and pelvis- wall thickening from descending colon could relate to underdistention versus colitis  -Ultrasound of the right lower extremity with no stenosis or occlusion  -CT of the right lower extremity with right knee joint effusion  -Status post bedside right knee joint aspiration on 5/28.  Synovial fluid analysis reveals hazy priscila fluid, 2638 WBCs with PMN predominance and no crystals seen.  Fluid not consistent with infection.    -Follow joint fluid cultures-neg to date   - Continue IV meropenem (started on 5/28 given new persistent fevers and worsening chest x-ray)  Anticipate 10 day course unless complication      I have performed a physical exam and reviewed and updated ROS and plan today 5/30/2022.  In review of yesterday's note 5/29/2022, there are no changes except as documented above.      Plan of care discussed with intensivist, Dr. Swartz

## 2022-05-30 NOTE — ASSESSMENT & PLAN NOTE
With uncontrolled hyperglycemia.  On oral medications at home  Status post insulin drip, added glargine and high sliding scale.  Diabetic enteral nutrition monitoring glucose closely  Continue statin

## 2022-05-30 NOTE — PROGRESS NOTES
"Critical Care Progress Note    Date of admission  5/24/2022    Chief Complaint  66 y.o. male admitted 5/24/2022 with septic shock    Hospital Course  \"66y M Hx of renal transplant 9/10/2010 for end stage polycystic kidney dz maintain on prednisone, tacrolimus and mcyophenolate, DM, chronic thrombocytopenia, CKD 3B, Pafib, bronchitis, Covid 19 11/2020. That on Sunday hit his shin. He since has been complaining of pain. This morning he called his younger brother. His brother went to his house found him altered. He was brought in by EMS as a trauma activation since he had AMS and bruising from his fall. CT head negative, CTA chest negative, CT abdomen negative, CT spine negative other then degenerative findings. He was in severe septic shock, started on pressors and fluid bolus. He was given broad spectrum microbials. His only compliant is his right leg. LP is currently pending and was traumatic. He would like to be full code.\" - Dr. Swartz      Interval Problem Update  Reviewed last 24 hour events:   - insulin gtt @ 6 units/hr with glucose 200s   - levophed gtt @ 18 mcg, added vasopressin   - SANKET unchanged, HD removed 5 L yesterday (daily for now)   - low phos, Mag   - Bcx 5/24 - e coli, repeat Bcx 5/28 NGTD, synovial fluid cx neg   - meropenem day 3/7 per ID, repeat Bcx 5/28 NGTD   - stress dose steroids   - AF, unchanged WBC @ 20 but bands improving   - plts down to 49   - sinus tach, -125   - awake and following, Yuko   - CVP 8   - anuric, no fernandez, bladder scan 100-150   - jet Tfs at goal, BMS for liquid stools   - leg wound care ongoing   - VD # 5, decreased RR 20, PEEP 10, SBT with RSBI 34, not following for other parameters, minimal secretions yes     Review of Systems  Review of Systems   Unable to perform ROS: Intubated        Vital Signs for last 24 hours   Temp:  [35.9 °C (96.7 °F)-36.7 °C (98.1 °F)] 36.4 °C (97.6 °F)  Pulse:  [] 89  Resp:  [22-39] 26  BP: ()/(55-81) 107/60  SpO2:  [92 " %-100 %] 93 %    Respiratory Information for the last 24 hours  Vent Mode: APVCMV  Rate (breaths/min): 26  Vt Target (mL): 530  PEEP/CPAP: 8  MAP: 11  Control VTE (exp VT): 556    Physical Exam   Physical Exam  Vitals and nursing note reviewed.   Constitutional:       General: He is awake.      Appearance: He is normal weight. He is ill-appearing. He is not diaphoretic.      Interventions: He is sedated, intubated and restrained.   HENT:      Head: Normocephalic and atraumatic.      Right Ear: External ear normal.      Left Ear: External ear normal.      Nose: Nose normal. No congestion.      Comments: Nasal core track in place     Mouth/Throat:      Mouth: Mucous membranes are moist.      Comments: Endotracheal tube in place  Eyes:      General: No scleral icterus.     Conjunctiva/sclera: Conjunctivae normal.      Pupils: Pupils are equal, round, and reactive to light.   Neck:      Comments: Bilateral IJ central venous catheters without hematoma  Cardiovascular:      Rate and Rhythm: Regular rhythm. Tachycardia present. Occasional extrasystoles are present.     Chest Wall: PMI is displaced.      Pulses: Decreased pulses.           Radial pulses are 1+ on the right side and 1+ on the left side.      Heart sounds: No murmur heard.     Comments: Remains hypotensive requiring vasopressor support  Pulmonary:      Effort: Tachypnea present. No accessory muscle usage. He is intubated.      Breath sounds: Examination of the right-lower field reveals rhonchi and rales. Examination of the left-lower field reveals rhonchi and rales. Rhonchi and rales present. No wheezing.      Comments: Plateau pressures low 20s, minimal secretions  Abdominal:      General: Bowel sounds are normal. There is no distension.      Palpations: Abdomen is soft.      Tenderness: There is no abdominal tenderness. There is no guarding.   Genitourinary:     Comments: Epps catheter in place  Musculoskeletal:      Cervical back: Neck supple. No  rigidity.      Right lower leg: Edema present.      Left lower leg: Edema present.      Comments: Bilateral lower extremity erythema and purplish discoloration with wounds, minimally tender to palpation   Skin:     General: Skin is warm and dry.      Capillary Refill: Capillary refill takes 2 to 3 seconds.      Coloration: Skin is not pale.   Neurological:      General: No focal deficit present.      Mental Status: He is alert.      Cranial Nerves: No cranial nerve deficit.      Comments: Follows commands, moves all extremities   Psychiatric:         Behavior: Behavior is cooperative.         Medications  Current Facility-Administered Medications   Medication Dose Route Frequency Provider Last Rate Last Admin   • MD Alert...ICU Electrolyte Replacement per Pharmacy   Other PHARMACY TO DOSE Micheline Younger M.D.       • potassium bicarbonate (KLYTE) effervescent tablet 50 mEq  50 mEq Enteral Tube BID Micheline Younger M.D.   50 mEq at 05/30/22 0536   • insulin regular (HumuLIN R) 100 Units in  mL Infusion  0-29 Units/hr Intravenous Continuous Reuben Swartz M.D. 6 mL/hr at 05/30/22 0506 6 Units/hr at 05/30/22 0506   • dextrose 50% (D50W) injection 12.5-25 g  12.5-25 g Intravenous PRN Reuben Swartz M.D.       • norepinephrine (Levophed) infusion 32 mg/500 mL (premix)  0-100 mcg/min Intravenous Continuous Reuben Swartz M.D. 16.9 mL/hr at 05/30/22 0528 18 mcg/min at 05/30/22 0528   • meropenem (Merrem) 500 mg in  mL IV-MBP  500 mg Intravenous DAILY Vivian Francois M.D.   Stopped at 05/29/22 1747   • artificial tears (EYE LUBRICANT) ophth ointment 1 Application  1 Application Both Eyes Q8HRS Reuben Swartz M.D.   1 Application at 05/30/22 0536   • heparin injection 2,800 Units  2,800 Units Intracatheter DIALYSIS PRN James Sheppard M.D.   2,800 Units at 05/29/22 1815   • propofol (DIPRIVAN) injection  0-80 mcg/kg/min Intravenous Continuous Reuben Swartz M.D.   Paused at 05/29/22 1200   •  acetaminophen (Tylenol) tablet 650 mg  650 mg Enteral Tube Q6HRS PRN SHARON Vickers   650 mg at 05/28/22 0515   • Pharmacy Consult: Enteral tube insertion - review meds/change route/product selection  1 Each Other PHARMACY TO DOSE Reuben Swartz M.D.       • Respiratory Therapy Consult   Nebulization Continuous RT Reuben Swartz M.D.       • famotidine (PEPCID) tablet 20 mg  20 mg Enteral Tube DAILY Reuben Swartz M.D.   20 mg at 05/30/22 0536    Or   • famotidine (PEPCID) injection 20 mg  20 mg Intravenous DAILY Reuben Swartz M.D.       • senna-docusate (PERICOLACE or SENOKOT S) 8.6-50 MG per tablet 2 Tablet  2 Tablet Enteral Tube BID Reuben Swartz M.D.   2 Tablet at 05/30/22 0536    And   • polyethylene glycol/lytes (MIRALAX) PACKET 1 Packet  1 Packet Enteral Tube QDAY PRN Reuben Swartz M.D.        And   • magnesium hydroxide (MILK OF MAGNESIA) suspension 30 mL  30 mL Enteral Tube QDAY PRN Reuben Swartz M.D.        And   • bisacodyl (DULCOLAX) suppository 10 mg  10 mg Rectal QDAY PRN Reuben Swartz M.D.       • lidocaine (XYLOCAINE) 1 % injection 2 mL  2 mL Tracheal Tube Q30 MIN PRN Reuben Swartz M.D.       • sodium chloride (OCEAN) 0.65 % nasal spray 2 Spray  2 Spray Nasal Q2HRS PRN Kayy Hopkins M.D.       • dakins 0.125% (1/4 strength) topical soln   Topical BID Reuben Swartz M.D.   473 mL at 05/30/22 0536   • hydrocortisone sodium succinate PF (Solu-CORTEF) 100 MG injection 50 mg  50 mg Intravenous Q6HRS Reuben Swartz M.D.   50 mg at 05/30/22 0536   • HYDROmorphone (Dilaudid) injection 0.5 mg  0.5 mg Intravenous Q2HRS PRN Tracey Whitfield M.D.   0.5 mg at 05/29/22 2228       Fluids    Intake/Output Summary (Last 24 hours) at 5/30/2022 0603  Last data filed at 5/30/2022 0400  Gross per 24 hour   Intake 3359.2 ml   Output 5465 ml   Net -2105.8 ml       Laboratory  Recent Labs     05/27/22  1213 05/28/22  0307 05/29/22  0805 05/30/22  0200   ISTATAPH 7.265*  7.437 7.564* 7.548*   ISTATAPCO2 63.6* 27.6 28.3 35.9   ISTATAPO2 292* 110* 72 55*   ISTATATCO2 31 19* 26 32   IIVLGIT6HXF 100* 99 97 92*   ISTATARTHCO3 28.9* 18.6 25.5* 31.2*   ISTATARTBE 0 -4 4* 8*   ISTATTEMP 98.6 F 101.3 F see below 97.7 F   ISTATFIO2 30 50 30 30   ISTATSPEC Arterial Arterial Arterial Arterial   ISTATAPHTC 7.265* 7.414  --  7.556*   [id number] 292* 120*  --  54*         Recent Labs     05/29/22  0420 05/29/22  1245 05/29/22 2056 05/30/22  0043 05/30/22  0505   SODIUM 136   < > 136 136 137   POTASSIUM 3.6   < > 3.6 4.2 4.0   CHLORIDE 100   < > 98 97 98   CO2 22   < > 25 26 26   BUN 57*   < > 45* 52* 58*   CREATININE 3.31*   < > 2.72* 2.93* 3.20*   MAGNESIUM 1.7  --  1.6 1.7  --    PHOSPHORUS 0.9*  --  2.1* 1.8*  --    CALCIUM 8.1*   < > 7.8* 7.8* 8.0*    < > = values in this interval not displayed.     Recent Labs     05/28/22  1445 05/28/22  2130 05/29/22 2056 05/30/22  0043 05/30/22  0505   ALTSGPT 29  --   --  29  --    ASTSGOT 27  --   --  18  --    ALKPHOSPHAT 128*  --   --  156*  --    TBILIRUBIN 0.8  --   --  0.5  --    GLUCOSE 262*   < > 163* 203* 218*    < > = values in this interval not displayed.     Recent Labs     05/28/22  0309 05/28/22 1445 05/29/22 0420 05/30/22  0043   WBC 28.9*  --  20.6*  --    NEUTSPOLYS 95.60*  --  90.40*  --    LYMPHOCYTES 0.00*  --  0.00*  --    MONOCYTES 3.50  --  4.40  --    EOSINOPHILS 0.00  --  0.00  --    BASOPHILS 0.00  --  0.00  --    ASTSGOT  --  27  --  18   ALTSGPT  --  29  --  29   ALKPHOSPHAT  --  128*  --  156*   TBILIRUBIN  --  0.8  --  0.5     Recent Labs     05/28/22  0309 05/29/22  0420   RBC 4.94 4.85   HEMOGLOBIN 14.3 14.2   HEMATOCRIT 43.3 42.1   PLATELETCT 58* 50*       Imaging  X-Ray:  I have personally reviewed the images and compared with prior images. and My impression is: Unchanged bilateral lower lobe infiltrate/atelectasis with edema pattern, endotracheal tube/gastric tube/left hemodialysis catheter in good position.  Right  IJ hemodialysis catheter 6 cm to deep  CT:    Reviewed  Echo:   Reviewed  Ultrasound:  Reviewed    Assessment/Plan  * Septic shock (HCC)- (present on admission)  Assessment & Plan  Concerns for Toxic shock syndrome with his erythroderma  Empiric rx clinda + zosyn + linezolid transitioned to Zosyn guided by ID with his E Coli bacteremia. Monitor need for surgical debridement appreciate consultation  S/p sepsis protocol and aggressive fluid resuscitation  Continue to titrate vasopressor support to maintain MAP >65 (added vasopressin)  Continue stress dose steroids  ID consulted  Follow-up on cultures  Wound care    Cardiac arrest (HCC)  Assessment & Plan  PEA arrest 5/27  Continue supportive care  Monitor neurologic function    Bacteremia due to Escherichia coli- (present on admission)  Assessment & Plan  Continue IV Zosyn for now  ID consulted  Follow-up on repeat blood cultures    Acute respiratory failure with hypoxia ([facility])  Assessment & Plan  Intubated 5/26  Continue full mechanical ventilatory support, decrease respiratory rate to 20  Increase PEEP to 10 and titrate FiO2 to goal SPO2 greater than 92%  RT/O2 protocol  Limit sedatives, add oxycodone as needed pain  ABCDEF bundle with hopeful extubation in the next 12 to 24 hours  Dialysis for fluid removal    Acute renal failure superimposed on stage 3a chronic kidney disease (HCC)  Assessment & Plan  Hx of CKD s/p renal transplant, acute renal injury related to septic shock and ischemic atn s/p contrast die load  Avoid nephrotoxins  Continue Lasix  Nephrology consulted  Continue daily dialysis for now  Hold immunosuppressants in the setting of septic shock    Knee effusion, right  Assessment & Plan  S/p arthrocentesis 5/28  Follow-up on final fluid culture    Acute metabolic encephalopathy  Assessment & Plan  Secondary to sepsis -improving  Close neurologic monitoring  Limit sedatives    Immunosuppression due to chronic steroid use ([facility])  Assessment &  Plan  Holding immunosuppressants for now  Continue stress dose steroids    Acute on chronic systolic heart failure (HCC)- (present on admission)  Assessment & Plan  With most recent ejection fraction 35%  Fluid management  Unable to tolerate beta-blocker or ACE inhibitor at this time given shock and renal failure    PAF (paroxysmal atrial fibrillation) (HCC)- (present on admission)  Assessment & Plan  hx of continue to optimize filling pressure and electrolytes, he is rate controlled,   Unable to tolerate anticoagulation due to thrombocytopenia  S/p trial of IV digoxin 5/28, hold additional for now  Optimize electrolytes, continuous telemetry monitoring    Type 2 diabetes mellitus with hyperlipidemia (HCC)- (present on admission)  Assessment & Plan  With uncontrolled hyperglycemia  Continue to titrate insulin drip  Diabetic enteral nutrition monitoring glucose closely  Continue statin    Thrombocytopenia (HCC)- (present on admission)  Assessment & Plan  Acute on chronic secondary to sepsis  Holding systemic anticoagulation for DVT prophylaxis  Monitor for bleeding, daily CBC       VTE:  Contraindicated -secondary to thrombocytopenia  Ulcer: H2 Antagonist  Lines: Central Line  Ongoing indication addressed, Arterial Line  Ongoing indication addressed and Epps Catheter  Ongoing indication addressed    I have performed a physical exam and reviewed and updated ROS and Plan today (5/30/2022). In review of yesterday's note (5/29/2022), there are no changes except as documented above.     Patient remains critically ill today requiring active management of his mechanical ventilatory support as well as titration of vasopressors. High risk of deterioration and worsening vital organ dysfunction and death without the above critical care interventions.    Discussed patient condition and risk of morbidity and/or mortality with Family, RN, RT, Pharmacy, [employer] [facility] resident, Charge nurse / hot rounds, Patient and nephrology.   Critical care time = 45 minutes in directly providing and coordinating critical care and extensive data review.  No time overlap and excludes procedures.

## 2022-05-30 NOTE — ASSESSMENT & PLAN NOTE
hx of continue to optimize filling pressure and electrolytes, hr is rate controlled,   Unable to tolerate anticoagulation due to thrombocytopenia  S/p trial of IV digoxin 5/28, hold additional for now  Optimize electrolytes, continuous telemetry monitoring

## 2022-05-30 NOTE — ASSESSMENT & PLAN NOTE
Hx of CKD s/p renal transplant, acute renal injury related to septic shock and ischemic atn s/p contrast die load  Avoid nephrotoxins  Continue Lasix  Nephrology consulted  Daily dialysis ongoing

## 2022-05-30 NOTE — HOSPITAL COURSE
"\"66y M Hx of renal transplant 9/10/2010 for end stage polycystic kidney dz maintain on prednisone, tacrolimus and mcyophenolate, DM, chronic thrombocytopenia, CKD 3B, Pafib, bronchitis, Covid 19 11/2020. That on Sunday hit his shin. He since has been complaining of pain. This morning he called his younger brother. His brother went to his house found him altered. He was brought in by EMS as a trauma activation since he had AMS and bruising from his fall. CT head negative, CTA chest negative, CT abdomen negative, CT spine negative other then degenerative findings. He was in severe septic shock, started on pressors and fluid bolus. He was given broad spectrum microbials. His only compliant is his right leg. LP is currently pending and was traumatic. He would like to be full code.\" - Dr. Swartz  "
40.17

## 2022-05-30 NOTE — PROGRESS NOTES
Most recent , rate adjusted accordingly. Per Clinical Pharmacist, Kendra Flores, okay to continue BG checks every 2 hours at this time.

## 2022-05-30 NOTE — PROGRESS NOTES
"[facility] -  PROGRESS NOTE               Author: Live Hirsch M.D. Date & Time: 5/30/2022  8:42 AM     HPI:  Patient is a 66 year old male with a PMHx of renal transplant 9/10/2010 for polycystic kidney disease, DM, thrombocytopenia, CKD3b, pafib, covid19 infection, who presents for AMS.  He was brought in activated trauma for fall, CT imaging has been negative, but was in severe septic shock started on pressors and give nfluid boluses.  On broad spectrum abx.  Currently complains of right leg pain but thinks its better.  Confirms his last dose of tacrolimus and cellcept was yesterday.  Currently on levophed    DAILY NEPHROLOGY SUMMARY:  5/24: Consult done  5/25: NAEO, no complaints, feels a bit better, family at bedside  5/26: Decompensated overnight, intubated due to resp. Distress and pressors initiated  5/27: NAEO, no complaints, hemodynamics decompensated and CRRT initiated instead, tolerated well overnight, still on it this AM  5/28: transitioned to daily iHD for now, +13.8L for hospitalization, remains intubated, on pressor support, Tmax 101.1F, WBC 28.9  5/29: tolerated HD, UF 3L 5/28, remains intubated and on pressor support  5/30: Tolerated UF, improved hemodynamics, remains on pressors, remains on mechanical ventilation     REVIEW OF SYSTEMS:    Unable to obtain, intubated    PMH/PSH/SH/FH:   Reviewed and unchanged since admission note    CURRENT MEDICATIONS:   Reviewed from admission to present day    VS:  /66   Pulse 97   Temp 36.5 °C (97.7 °F) (Temporal)   Resp (!) 26   Ht 1.956 m (6' 5\")   Wt (!) 128 kg (281 lb 4.9 oz)   SpO2 98%   BMI 33.36 kg/m²     Physical Exam  Vitals and nursing note reviewed.   Constitutional:       Appearance: Normal appearance. He is ill-appearing.      Interventions: He is sedated and intubated.   HENT:      Head: Normocephalic and atraumatic.      Right Ear: External ear normal.      Left Ear: External ear normal.      " Mouth/Throat:      Comments: ETT in place  Eyes:      General: No scleral icterus.  Cardiovascular:      Rate and Rhythm: Tachycardia present. Rhythm irregular.   Pulmonary:      Effort: Pulmonary effort is normal. He is intubated.   Abdominal:      General: There is no distension.      Palpations: Abdomen is soft.   Musculoskeletal:         General: No deformity.      Right lower leg: Edema present.      Left lower leg: Edema present.   Skin:     Findings: Lesion (RLE with open wound) present. No rash.   Neurological:      Comments: sedated   Psychiatric:      Comments: sedated       Fluids:  In: 3497.8 [I.V.:1447.8; Other:150; Dialysis:500]  Out: 5465     LABS:  Recent Labs     05/29/22  2056 05/30/22  0043 05/30/22  0505   SODIUM 136 136 137   POTASSIUM 3.6 4.2 4.0   CHLORIDE 98 97 98   CO2 25 26 26   GLUCOSE 163* 203* 218*   BUN 45* 52* 58*   CREATININE 2.72* 2.93* 3.20*   CALCIUM 7.8* 7.8* 8.0*     IMAGING:   All imaging reviewed from admission to present day    IMPRESSION:  # SANKET  - Etiology likely 2/2 ATN  - CPK mildly elevated 5/24  - has received contrast  - Non-oliguric, but UOP dropping it seems  - TempCath placed by ICU  # CKD Stage 3b  - BCr ~ 1.5-1.9  # E. Coli bacteremia/septic shock  - Source unclear, ?colitis on imaging  - Abx on board  - pressor support  # Hyperkalemia, improved  - No ECG changes  # Encephalopathy  - Etiology likely 2/2 hypoperfusion/sepsis  # Acute respiratory failure  - Intubated 5/26  # DDKT  - Etiology 2/2 ADPKD  - XPL in 2010  - On Tac/MMF/Pred regimen but being held due to sepsis  # Chronic immunosuppression  - held  # RLE ulcer  - Trauma  # type 2 DM  # thrombocytopenia  - worsening    PLAN:  - Continue iHD daily for volume control-reduced bicarb bath  - UF as tolerated  - Daily labs and weights  - Monitor I/O  - Maintain MAP > 65  - Continue holding IS meds for now  - Dose all meds per eGFR < 15    Thank you

## 2022-05-30 NOTE — PROGRESS NOTES
[facility] Services Progress Note    Hemodialysis treatment x 3.5 hours completed as ordered per Dr. Hirsch.  Treatment started at 1030 and ended at 1405.      Net UF Removed: 4,000 mL    Patient tolerated treatment well with vasopressor support x 2 on Vasopressin  and Levophed from 10 mcg down to 7 mcg/min titrated for BP support/allow for fluid removal, mild hypotensive episode x1  Left CVC access with good patency and flow x 2, positional at times resolved by repositioning patent's neck  Patient stable during and post treatment.   See Acute HD flow sheets on clinical data notes under media for details.      Post tx access: Left IJ HD catheter flushed with saline then locked with heparin 1000 units/ml per designated amount in each lumen (see MAR) then clamped, capped aseptically and labeled properly. CVC dressings clean, dry and intact. No s/s of infection to HD catheter site. Heparin lock to be aspirated prior to next dialysis/CVC use by dialysis RN only. Please do not flush or draw from ports.    Please notify Nephrologist/Dialysis for follow-up.     Report given to primary care nurse LUZ MARIA Galarza RN.

## 2022-05-30 NOTE — PROGRESS NOTES
"Critical Care Progress Note    Date of admission  5/24/2022    Chief Complaint  66 y.o. male admitted 5/24/2022 with septic shock    Hospital Course  \"66y M Hx of renal transplant 9/10/2010 for end stage polycystic kidney dz maintain on prednisone, tacrolimus and mcyophenolate, DM, chronic thrombocytopenia, CKD 3B, Pafib, bronchitis, Covid 19 11/2020. That on Sunday hit his shin. He since has been complaining of pain. This morning he called his younger brother. His brother went to his house found him altered. He was brought in by EMS as a trauma activation since he had AMS and bruising from his fall. CT head negative, CTA chest negative, CT abdomen negative, CT spine negative other then degenerative findings. He was in severe septic shock, started on pressors and fluid bolus. He was given broad spectrum microbials. His only compliant is his right leg. LP is currently pending and was traumatic. He would like to be full code.\" - Dr. Swartz      Interval Problem Update  -vital signs unremarkable  -Bladder scan 100-150. Net out put -1967.  -magnesium and phos have been repleted  -not on sedation. Remains intubated.  -starting oxycodone for analgesia  -central line was pulled 6 cm back.  -working on decreasing minute ventilation due to alkalosis. increased PEEP to 10 from 8.  -added vasopressin to titrate down levo.  -repeat Blood cultures remain negative  -Continuing daily dialysis for now    Review of Systems  Review of Systems   Unable to perform ROS: Intubated        Vital Signs for last 24 hours   Temp:  [35.9 °C (96.7 °F)-36.6 °C (97.9 °F)] 36.5 °C (97.7 °F)  Pulse:  [] 86  Resp:  [20-39] 20  BP: ()/(55-81) 121/66  SpO2:  [93 %-100 %] 96 %    Respiratory Information for the last 24 hours  Vent Mode: APVCMV  Rate (breaths/min): 20  Vt Target (mL): 530  PEEP/CPAP: 10  P Support: 5  MAP: 16  Length of Weaning Trial (Hours): 1  Control VTE (exp VT): 553    Physical Exam   Physical Exam  Vitals and nursing " note reviewed.   Constitutional:       General: He is awake.      Appearance: He is normal weight. He is ill-appearing. He is not diaphoretic.      Interventions: He is sedated, intubated and restrained.   HENT:      Head: Normocephalic and atraumatic.      Right Ear: External ear normal.      Left Ear: External ear normal.      Nose: Nose normal. No congestion or rhinorrhea.      Comments: Nasal core track in place     Mouth/Throat:      Mouth: Mucous membranes are moist.      Pharynx: No oropharyngeal exudate.      Comments: Endotracheal tube in place  Eyes:      General: No scleral icterus.     Conjunctiva/sclera: Conjunctivae normal.      Pupils: Pupils are equal, round, and reactive to light.   Neck:      Comments: Bilateral IJ central venous catheters without hematoma  Cardiovascular:      Rate and Rhythm: Normal rate and regular rhythm. Occasional extrasystoles are present.     Chest Wall: PMI is displaced.      Pulses: Decreased pulses.           Radial pulses are 1+ on the right side and 1+ on the left side.      Heart sounds: No murmur heard.  Pulmonary:      Effort: Tachypnea present. No accessory muscle usage. He is intubated.      Breath sounds: Examination of the right-lower field reveals rhonchi and rales. Examination of the left-lower field reveals rhonchi and rales. Rhonchi and rales present. No wheezing.      Comments: Plateau pressures low 20s, minimal secretions  Abdominal:      General: Bowel sounds are normal. There is no distension.      Palpations: Abdomen is soft.      Tenderness: There is no abdominal tenderness. There is no guarding.   Genitourinary:     Comments: Epps catheter in place  Musculoskeletal:      Cervical back: Neck supple. No rigidity.      Right lower leg: Edema present.      Left lower leg: Edema present.      Comments: Bilateral lower extremity erythema and purplish discoloration with wounds, minimally tender to palpation   Skin:     General: Skin is warm and dry.       Capillary Refill: Capillary refill takes 2 to 3 seconds.      Coloration: Skin is not pale.   Neurological:      General: No focal deficit present.      Mental Status: He is alert.      Cranial Nerves: No cranial nerve deficit.      Comments: Follows commands, moves all extremities   Psychiatric:         Behavior: Behavior is cooperative.         Medications  Current Facility-Administered Medications   Medication Dose Route Frequency Provider Last Rate Last Admin   • vasopressin (VASOSTRICT) 20 Units in  mL Infusion  0.03 Units/min Intravenous Continuous Jeremy M Gonda, M.D. 9 mL/hr at 05/30/22 0845 0.03 Units/min at 05/30/22 0845   • oxyCODONE immediate-release (ROXICODONE) tablet 5 mg  5 mg Enteral Tube Q4HRS PRN Jeremy M Gonda, M.D.   5 mg at 05/30/22 1159   • sodium phosphate 15 mmol in dextrose 5% 500 mL ivpb  15 mmol Intravenous Once Jeremy M Gonda, M.D. 83.3 mL/hr at 05/30/22 0908 15 mmol at 05/30/22 0908   • potassium bicarbonate (KLYTE) effervescent tablet 50 mEq  50 mEq Enteral Tube BID Micheline Younger M.D.   50 mEq at 05/30/22 0536   • insulin regular (HumuLIN R) 100 Units in  mL Infusion  0-29 Units/hr Intravenous Continuous Reuben Swartz M.D. 5.5 mL/hr at 05/30/22 1140 5.5 Units/hr at 05/30/22 1140   • dextrose 50% (D50W) injection 12.5-25 g  12.5-25 g Intravenous PRN Reuben Swartz M.D.       • norepinephrine (Levophed) infusion 32 mg/500 mL (premix)  0-100 mcg/min Intravenous Continuous Reuben Swartz M.D. 9.4 mL/hr at 05/30/22 0940 10 mcg/min at 05/30/22 0940   • meropenem (Merrem) 500 mg in  mL IV-MBP  500 mg Intravenous DAILY Vivian Francois M.D.   Stopped at 05/29/22 1747   • artificial tears (EYE LUBRICANT) ophth ointment 1 Application  1 Application Both Eyes Q8HRS Reuben Swartz M.D.   1 Application at 05/30/22 0596   • heparin injection 2,800 Units  2,800 Units Intracatheter DIALYSIS PRN James Sheppard M.D.   2,800 Units at 05/29/22 1815   • acetaminophen  (Tylenol) tablet 650 mg  650 mg Enteral Tube Q6HRS PRN SHARON Vickers   650 mg at 05/28/22 0515   • Pharmacy Consult: Enteral tube insertion - review meds/change route/product selection  1 Each Other PHARMACY TO DOSE Reuben Swartz M.D.       • Respiratory Therapy Consult   Nebulization Continuous RT Reuben Swartz M.D.       • famotidine (PEPCID) tablet 20 mg  20 mg Enteral Tube DAILY Reuben Swartz M.D.   20 mg at 05/30/22 0536    Or   • famotidine (PEPCID) injection 20 mg  20 mg Intravenous DAILY Reuben Swartz M.D.       • senna-docusate (PERICOLACE or SENOKOT S) 8.6-50 MG per tablet 2 Tablet  2 Tablet Enteral Tube BID Reuben Swartz M.D.   2 Tablet at 05/30/22 0536    And   • polyethylene glycol/lytes (MIRALAX) PACKET 1 Packet  1 Packet Enteral Tube QDAY PRN Reuben Swartz M.D.        And   • magnesium hydroxide (MILK OF MAGNESIA) suspension 30 mL  30 mL Enteral Tube QDAY PRN Reuben Swartz M.D.        And   • bisacodyl (DULCOLAX) suppository 10 mg  10 mg Rectal QDAY PRN Reuben Swartz M.D.       • lidocaine (XYLOCAINE) 1 % injection 2 mL  2 mL Tracheal Tube Q30 MIN PRN Reuben Swartz M.D.       • sodium chloride (OCEAN) 0.65 % nasal spray 2 Spray  2 Spray Nasal Q2HRS PRN Kayy Hopkins M.D.       • dakins 0.125% (1/4 strength) topical soln   Topical BID Reuben Swartz M.D.   473 mL at 05/30/22 0536   • hydrocortisone sodium succinate PF (Solu-CORTEF) 100 MG injection 50 mg  50 mg Intravenous Q6HRS Reuben Swartz M.D.   50 mg at 05/30/22 1159   • HYDROmorphone (Dilaudid) injection 0.5 mg  0.5 mg Intravenous Q2HRS PRN Tracey Whitfield M.D.   0.5 mg at 05/29/22 2228       Fluids    Intake/Output Summary (Last 24 hours) at 5/30/2022 1233  Last data filed at 5/30/2022 0600  Gross per 24 hour   Intake 2132.35 ml   Output 5465 ml   Net -3332.65 ml       Laboratory  Recent Labs     05/28/22  0307 05/29/22  0805 05/30/22  0200   ISTATAPH 7.437 7.564* 7.548*   ISTATAPCO2  27.6 28.3 35.9   ISTATAPO2 110* 72 55*   ISTATATCO2 19* 26 32   OQMFANQ6CAI 99 97 92*   ISTATARTHCO3 18.6 25.5* 31.2*   ISTATARTBE -4 4* 8*   ISTATTEMP 101.3 F see below 97.7 F   ISTATFIO2 50 30 30   ISTATSPEC Arterial Arterial Arterial   ISTATAPHTC 7.414  --  7.556*   [id number] 120*  --  54*         Recent Labs     05/29/22 0420 05/29/22  1245 05/29/22 2056 05/30/22  0043 05/30/22  0505 05/30/22  0940   SODIUM 136   < > 136 136 137  --    POTASSIUM 3.6   < > 3.6 4.2 4.0  --    CHLORIDE 100   < > 98 97 98  --    CO2 22   < > 25 26 26  --    BUN 57*   < > 45* 52* 58*  --    CREATININE 3.31*   < > 2.72* 2.93* 3.20*  --    MAGNESIUM 1.7  --  1.6 1.7  --   --    PHOSPHORUS 0.9*  --  2.1* 1.8*  --  1.8*   CALCIUM 8.1*   < > 7.8* 7.8* 8.0*  --     < > = values in this interval not displayed.     Recent Labs     05/28/22 1445 05/28/22  2130 05/29/22 2056 05/30/22 0043 05/30/22  0505 05/30/22  0940   ALTSGPT 29  --   --  29  --   --    ASTSGOT 27  --   --  18  --   --    ALKPHOSPHAT 128*  --   --  156*  --   --    TBILIRUBIN 0.8  --   --  0.5  --   --    PREALBUMIN  --   --   --   --   --  9.2*   GLUCOSE 262*   < > 163* 203* 218*  --     < > = values in this interval not displayed.     Recent Labs     05/28/22  0309 05/28/22 1445 05/29/22 0420 05/30/22  0043 05/30/22  0505   WBC 28.9*  --  20.6*  --  20.3*   NEUTSPOLYS 95.60*  --  90.40*  --  87.00*   LYMPHOCYTES 0.00*  --  0.00*  --  2.60*   MONOCYTES 3.50  --  4.40  --  7.80   EOSINOPHILS 0.00  --  0.00  --  0.00   BASOPHILS 0.00  --  0.00  --  0.00   ASTSGOT  --  27  --  18  --    ALTSGPT  --  29  --  29  --    ALKPHOSPHAT  --  128*  --  156*  --    TBILIRUBIN  --  0.8  --  0.5  --      Recent Labs     05/28/22  0309 05/29/22  0420 05/30/22  0505   RBC 4.94 4.85 4.70   HEMOGLOBIN 14.3 14.2 13.6*   HEMATOCRIT 43.3 42.1 41.2*   PLATELETCT 58* 50* 49*       Imaging  X-Ray:  I have personally reviewed the images and compared with prior images. and My impression is:  Unchanged bilateral lower lobe infiltrate/atelectasis with edema pattern, endotracheal tube/gastric tube/left hemodialysis catheter in good position.  Right IJ hemodialysis catheter 6 cm to deep  CT:    Reviewed  Echo:   Reviewed  Ultrasound:  Reviewed    Assessment/Plan  * Septic shock (HCC)- (present on admission)  Assessment & Plan  Concerns for Toxic shock syndrome with his erythroderma  Empiric rx clinda + zosyn + linezolid transitioned to Zosyn guided by ID with his E Coli bacteremia. Monitor need for surgical debridement appreciate consultation  S/p sepsis protocol and aggressive fluid resuscitation  Continue to titrate vasopressor support to maintain MAP >65 (added vasopressin)  Continue stress dose steroids  ID consulted  Follow-up on cultures  Wound care    Knee effusion, right  Assessment & Plan  S/p arthrocentesis 5/28  Follow-up on final fluid culture    Cardiac arrest (HCC)  Assessment & Plan  PEA arrest 5/27  Continue supportive care  Monitor neurologic function    Acute metabolic encephalopathy  Assessment & Plan  Secondary to sepsis -improving  Close neurologic monitoring  Limit sedatives    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)  Assessment & Plan  Holding immunosuppressants for now  Continue stress dose steroids    Acute on chronic systolic heart failure (HCC)- (present on admission)  Assessment & Plan  With most recent ejection fraction 35%  Fluid management  Unable to tolerate beta-blocker or ACE inhibitor at this time given shock and renal failure    Bacteremia due to Escherichia coli- (present on admission)  Assessment & Plan  Continue IV Zosyn for now  ID consulted  Follow-up on repeat blood cultures    Acute respiratory failure with hypoxia (HCC)  Assessment & Plan  Intubated 5/26  Continue full mechanical ventilatory support, decrease respiratory rate to 20  Increase PEEP to 10 and titrate FiO2 to goal SPO2 greater than 92%  RT/O2 protocol  Limit sedatives, add oxycodone as  needed pain  ABCDEF bundle with hopeful extubation in the next 12 to 24 hours  Dialysis for fluid removal    PAF (paroxysmal atrial fibrillation) ([facility])- (present on admission)  Assessment & Plan  hx of continue to optimize filling pressure and electrolytes, he is rate controlled,   Unable to tolerate anticoagulation due to thrombocytopenia  S/p trial of IV digoxin 5/28, hold additional for now  Optimize electrolytes, continuous telemetry monitoring    Type 2 diabetes mellitus with hyperlipidemia ([facility])- (present on admission)  Assessment & Plan  With uncontrolled hyperglycemia  Continue to titrate insulin drip  Diabetic enteral nutrition monitoring glucose closely  Continue statin    Thrombocytopenia ([facility])- (present on admission)  Assessment & Plan  Acute on chronic secondary to sepsis  Holding systemic anticoagulation for DVT prophylaxis  Monitor for bleeding, daily CBC    Acute renal failure superimposed on stage 3a chronic kidney disease (HCC)  Assessment & Plan  Hx of CKD s/p renal transplant, acute renal injury related to septic shock and ischemic atn s/p contrast die load  Avoid nephrotoxins  Continue Lasix  Nephrology consulted  Continue daily dialysis for now  Hold immunosuppressants in the setting of septic shock

## 2022-05-31 ENCOUNTER — APPOINTMENT (OUTPATIENT)
Dept: RADIOLOGY | Facility: MEDICAL CENTER | Age: 67
DRG: 870 | End: 2022-05-31
Attending: INTERNAL MEDICINE
Payer: MEDICARE

## 2022-05-31 LAB
ANION GAP SERPL CALC-SCNC: 13 MMOL/L (ref 7–16)
BACTERIA FLD AEROBE CULT: NORMAL
BASOPHILS # BLD AUTO: 0 % (ref 0–1.8)
BASOPHILS # BLD: 0 K/UL (ref 0–0.12)
BUN SERPL-MCNC: 68 MG/DL (ref 8–22)
BURR CELLS BLD QL SMEAR: NORMAL
CALCIUM SERPL-MCNC: 8 MG/DL (ref 8.5–10.5)
CHLORIDE SERPL-SCNC: 100 MMOL/L (ref 96–112)
CO2 SERPL-SCNC: 24 MMOL/L (ref 20–33)
CREAT SERPL-MCNC: 3.29 MG/DL (ref 0.5–1.4)
EOSINOPHIL # BLD AUTO: 0 K/UL (ref 0–0.51)
EOSINOPHIL NFR BLD: 0 % (ref 0–6.9)
ERYTHROCYTE [DISTWIDTH] IN BLOOD BY AUTOMATED COUNT: 50.3 FL (ref 35.9–50)
GFR SERPLBLD CREATININE-BSD FMLA CKD-EPI: 20 ML/MIN/1.73 M 2
GLUCOSE BLD STRIP.AUTO-MCNC: 123 MG/DL (ref 65–99)
GLUCOSE BLD STRIP.AUTO-MCNC: 127 MG/DL (ref 65–99)
GLUCOSE BLD STRIP.AUTO-MCNC: 133 MG/DL (ref 65–99)
GLUCOSE BLD STRIP.AUTO-MCNC: 145 MG/DL (ref 65–99)
GLUCOSE BLD STRIP.AUTO-MCNC: 146 MG/DL (ref 65–99)
GLUCOSE BLD STRIP.AUTO-MCNC: 157 MG/DL (ref 65–99)
GLUCOSE BLD STRIP.AUTO-MCNC: 167 MG/DL (ref 65–99)
GLUCOSE BLD STRIP.AUTO-MCNC: 168 MG/DL (ref 65–99)
GLUCOSE BLD STRIP.AUTO-MCNC: 179 MG/DL (ref 65–99)
GLUCOSE BLD STRIP.AUTO-MCNC: 195 MG/DL (ref 65–99)
GLUCOSE BLD STRIP.AUTO-MCNC: 222 MG/DL (ref 65–99)
GLUCOSE BLD STRIP.AUTO-MCNC: 224 MG/DL (ref 65–99)
GLUCOSE SERPL-MCNC: 239 MG/DL (ref 65–99)
GRAM STN SPEC: NORMAL
HCT VFR BLD AUTO: 38.8 % (ref 42–52)
HGB BLD-MCNC: 12.5 G/DL (ref 14–18)
LYMPHOCYTES # BLD AUTO: 0.67 K/UL (ref 1–4.8)
LYMPHOCYTES NFR BLD: 3.5 % (ref 22–41)
MANUAL DIFF BLD: NORMAL
MCH RBC QN AUTO: 28.7 PG (ref 27–33)
MCHC RBC AUTO-ENTMCNC: 32.2 G/DL (ref 33.7–35.3)
MCV RBC AUTO: 89 FL (ref 81.4–97.8)
METAMYELOCYTES NFR BLD MANUAL: 1.8 %
MONOCYTES # BLD AUTO: 1.02 K/UL (ref 0–0.85)
MONOCYTES NFR BLD AUTO: 5.3 % (ref 0–13.4)
MORPHOLOGY BLD-IMP: NORMAL
NEUTROPHILS # BLD AUTO: 17.16 K/UL (ref 1.82–7.42)
NEUTROPHILS NFR BLD: 89.4 % (ref 44–72)
NRBC # BLD AUTO: 0.13 K/UL
NRBC BLD-RTO: 0.7 /100 WBC
OVALOCYTES BLD QL SMEAR: NORMAL
PHOSPHATE SERPL-MCNC: 1.8 MG/DL (ref 2.5–4.5)
PLATELET # BLD AUTO: 46 K/UL (ref 164–446)
PLATELET BLD QL SMEAR: NORMAL
POIKILOCYTOSIS BLD QL SMEAR: NORMAL
POLYCHROMASIA BLD QL SMEAR: NORMAL
POTASSIUM SERPL-SCNC: 5.4 MMOL/L (ref 3.6–5.5)
RBC # BLD AUTO: 4.36 M/UL (ref 4.7–6.1)
RBC BLD AUTO: PRESENT
SIGNIFICANT IND 70042: NORMAL
SITE SITE: NORMAL
SODIUM SERPL-SCNC: 137 MMOL/L (ref 135–145)
SOURCE SOURCE: NORMAL
WBC # BLD AUTO: 19.2 K/UL (ref 4.8–10.8)

## 2022-05-31 PROCEDURE — 700111 HCHG RX REV CODE 636 W/ 250 OVERRIDE (IP): Performed by: INTERNAL MEDICINE

## 2022-05-31 PROCEDURE — 85007 BL SMEAR W/DIFF WBC COUNT: CPT

## 2022-05-31 PROCEDURE — 99291 CRITICAL CARE FIRST HOUR: CPT | Mod: GC | Performed by: INTERNAL MEDICINE

## 2022-05-31 PROCEDURE — 700102 HCHG RX REV CODE 250 W/ 637 OVERRIDE(OP): Performed by: STUDENT IN AN ORGANIZED HEALTH CARE EDUCATION/TRAINING PROGRAM

## 2022-05-31 PROCEDURE — 700105 HCHG RX REV CODE 258: Performed by: INTERNAL MEDICINE

## 2022-05-31 PROCEDURE — 770022 HCHG ROOM/CARE - ICU (200)

## 2022-05-31 PROCEDURE — 700111 HCHG RX REV CODE 636 W/ 250 OVERRIDE (IP): Performed by: STUDENT IN AN ORGANIZED HEALTH CARE EDUCATION/TRAINING PROGRAM

## 2022-05-31 PROCEDURE — A9270 NON-COVERED ITEM OR SERVICE: HCPCS | Performed by: INTERNAL MEDICINE

## 2022-05-31 PROCEDURE — 700101 HCHG RX REV CODE 250: Performed by: INTERNAL MEDICINE

## 2022-05-31 PROCEDURE — 99233 SBSQ HOSP IP/OBS HIGH 50: CPT | Performed by: INTERNAL MEDICINE

## 2022-05-31 PROCEDURE — 82962 GLUCOSE BLOOD TEST: CPT

## 2022-05-31 PROCEDURE — 94003 VENT MGMT INPAT SUBQ DAY: CPT

## 2022-05-31 PROCEDURE — 90935 HEMODIALYSIS ONE EVALUATION: CPT

## 2022-05-31 PROCEDURE — A9270 NON-COVERED ITEM OR SERVICE: HCPCS | Performed by: STUDENT IN AN ORGANIZED HEALTH CARE EDUCATION/TRAINING PROGRAM

## 2022-05-31 PROCEDURE — 94799 UNLISTED PULMONARY SVC/PX: CPT

## 2022-05-31 PROCEDURE — 51798 US URINE CAPACITY MEASURE: CPT

## 2022-05-31 PROCEDURE — 700102 HCHG RX REV CODE 250 W/ 637 OVERRIDE(OP): Performed by: INTERNAL MEDICINE

## 2022-05-31 PROCEDURE — 84100 ASSAY OF PHOSPHORUS: CPT

## 2022-05-31 PROCEDURE — 85025 COMPLETE CBC W/AUTO DIFF WBC: CPT

## 2022-05-31 PROCEDURE — 94150 VITAL CAPACITY TEST: CPT

## 2022-05-31 PROCEDURE — 71045 X-RAY EXAM CHEST 1 VIEW: CPT

## 2022-05-31 PROCEDURE — 80048 BASIC METABOLIC PNL TOTAL CA: CPT

## 2022-05-31 PROCEDURE — 80197 ASSAY OF TACROLIMUS: CPT

## 2022-05-31 RX ORDER — FUROSEMIDE 10 MG/ML
80 INJECTION INTRAMUSCULAR; INTRAVENOUS
Status: DISCONTINUED | OUTPATIENT
Start: 2022-05-31 | End: 2022-06-01

## 2022-05-31 RX ORDER — TACROLIMUS 1 MG/1
1 CAPSULE ORAL 2 TIMES DAILY
Status: DISCONTINUED | OUTPATIENT
Start: 2022-05-31 | End: 2022-05-31

## 2022-05-31 RX ADMIN — MEROPENEM 500 MG: 500 INJECTION, POWDER, FOR SOLUTION INTRAVENOUS at 18:47

## 2022-05-31 RX ADMIN — HYDROMORPHONE HYDROCHLORIDE 0.5 MG: 1 INJECTION, SOLUTION INTRAMUSCULAR; INTRAVENOUS; SUBCUTANEOUS at 00:59

## 2022-05-31 RX ADMIN — VASOPRESSIN 0.03 UNITS/MIN: 20 INJECTION, SOLUTION INTRAMUSCULAR; SUBCUTANEOUS at 14:35

## 2022-05-31 RX ADMIN — HYDROCORTISONE SODIUM SUCCINATE 50 MG: 100 INJECTION, POWDER, FOR SOLUTION INTRAMUSCULAR; INTRAVENOUS at 05:48

## 2022-05-31 RX ADMIN — POTASSIUM BICARBONATE 50 MEQ: 978 TABLET, EFFERVESCENT ORAL at 05:48

## 2022-05-31 RX ADMIN — SODIUM PHOSPHATE, MONOBASIC, MONOHYDRATE AND SODIUM PHOSPHATE, DIBASIC, ANHYDROUS 30 MMOL: 276; 142 INJECTION, SOLUTION INTRAVENOUS at 10:28

## 2022-05-31 RX ADMIN — HYDROCORTISONE SODIUM SUCCINATE 50 MG: 100 INJECTION, POWDER, FOR SOLUTION INTRAMUSCULAR; INTRAVENOUS at 12:56

## 2022-05-31 RX ADMIN — ALTEPLASE 1.4 MG: 2.2 INJECTION, POWDER, LYOPHILIZED, FOR SOLUTION INTRAVENOUS at 08:20

## 2022-05-31 RX ADMIN — DAKIN'S SOLUTION 0.125% (QUARTER STRENGTH) 473 ML: 0.12 SOLUTION at 05:49

## 2022-05-31 RX ADMIN — SODIUM CHLORIDE 4 UNITS/HR: 9 INJECTION, SOLUTION INTRAVENOUS at 19:59

## 2022-05-31 RX ADMIN — FUROSEMIDE 80 MG: 10 INJECTION, SOLUTION INTRAMUSCULAR; INTRAVENOUS at 12:57

## 2022-05-31 RX ADMIN — TACROLIMUS 1 MG: 5 CAPSULE ORAL at 12:57

## 2022-05-31 RX ADMIN — HEPARIN SODIUM 2800 UNITS: 1000 INJECTION, SOLUTION INTRAVENOUS; SUBCUTANEOUS at 13:00

## 2022-05-31 RX ADMIN — HYDROCORTISONE SODIUM SUCCINATE 25 MG: 100 INJECTION, POWDER, FOR SOLUTION INTRAMUSCULAR; INTRAVENOUS at 18:47

## 2022-05-31 RX ADMIN — FUROSEMIDE 80 MG: 10 INJECTION, SOLUTION INTRAMUSCULAR; INTRAVENOUS at 18:46

## 2022-05-31 RX ADMIN — SENNOSIDES AND DOCUSATE SODIUM 2 TABLET: 50; 8.6 TABLET ORAL at 05:48

## 2022-05-31 RX ADMIN — FAMOTIDINE 20 MG: 10 INJECTION, SOLUTION INTRAVENOUS at 05:48

## 2022-05-31 RX ADMIN — VASOPRESSIN 0.03 UNITS/MIN: 20 INJECTION, SOLUTION INTRAMUSCULAR; SUBCUTANEOUS at 03:00

## 2022-05-31 RX ADMIN — TACROLIMUS 1 MG: 5 CAPSULE ORAL at 18:49

## 2022-05-31 RX ADMIN — DAKIN'S SOLUTION 0.125% (QUARTER STRENGTH) 473 ML: 0.12 SOLUTION at 22:47

## 2022-05-31 RX ADMIN — SODIUM CHLORIDE 4 UNITS/HR: 9 INJECTION, SOLUTION INTRAVENOUS at 02:19

## 2022-05-31 ASSESSMENT — PULMONARY FUNCTION TESTS: FVC: 2.4

## 2022-05-31 ASSESSMENT — PAIN DESCRIPTION - PAIN TYPE
TYPE: ACUTE PAIN

## 2022-05-31 ASSESSMENT — ENCOUNTER SYMPTOMS: FEVER: 0

## 2022-05-31 ASSESSMENT — FIBROSIS 4 INDEX: FIB4 SCORE: 4.43

## 2022-05-31 NOTE — CARE PLAN
Problem: Pain - Standard  Goal: Alleviation of pain or a reduction in pain to the patient’s comfort goal  Outcome: Progressing     Problem: Skin Integrity  Goal: Skin integrity is maintained or improved  Outcome: Progressing     Problem: Fall Risk  Goal: Patient will remain free from falls  Outcome: Progressing       The patient is Stable    Shift Goals  Clinical Goals: maintain MAP > 65, titrate down on Levo as tollerated  Patient Goals: JUDITH  Family Goals: updates    Progress made toward(s) clinical / shift goals:  Vital signs have remained stable.    Patient is not progressing towards the following goals:

## 2022-05-31 NOTE — FLOWSHEET NOTE
05/31/22 0612   Weaning Parameters   RR (bpm) 20   $ FVC / Vital Capacity (liters)  2.4   NIF (cm H2O)  -28   Rapid Shallow Breathing Index (RR/VT) 42   Spontaneous VE 10.5   Spontaneous

## 2022-05-31 NOTE — PROGRESS NOTES
"Critical Care Progress Note    Date of admission  5/24/2022    Chief Complaint  66 y.o. male admitted 5/24/2022 with septic shock    Hospital Course  \"66y M Hx of renal transplant 9/10/2010 for end stage polycystic kidney dz maintain on prednisone, tacrolimus and mcyophenolate, DM, chronic thrombocytopenia, CKD 3B, Pafib, bronchitis, Covid 19 11/2020. That on Sunday hit his shin. He since has been complaining of pain. This morning he called his younger brother. His brother went to his house found him altered. He was brought in by EMS as a trauma activation since he had AMS and bruising from his fall. CT head negative, CTA chest negative, CT abdomen negative, CT spine negative other then degenerative findings. He was in severe septic shock, started on pressors and fluid bolus. He was given broad spectrum microbials. His only compliant is his right leg. LP is currently pending and was traumatic. He would like to be full code.\" - Dr. Swartz      Interval Problem Update  -Patient has been extubated, on 3 L oxygen now.  -Patient is currently on just vasopressin, off Levophed  -repeat Blood cultures from May 28 remain negative  -Continuing dialysis per nephrology. Per nephrology, may need to replace vascular cath.  Per nephrology start furosemide 80 mg IV twice daily per nephrology restart tacrolimus 1 mg twice daily, holding MMF for now.  Per nephrology continuing stress dose steroids.    Review of Systems  Review of Systems   Unable to perform ROS: Intubated        Vital Signs for last 24 hours   Temp:  [35.9 °C (96.7 °F)-36.4 °C (97.5 °F)] 36.3 °C (97.4 °F)  Pulse:  [] 90  Resp:  [11-67] 37  BP: ()/(56-85) 105/74  SpO2:  [95 %-100 %] 100 %    Respiratory Information for the last 24 hours  Vent Mode: APVCMV  Rate (breaths/min): 16  Vt Target (mL): 530  PEEP/CPAP: 8  P Support: 5  MAP: 10  Length of Weaning Trial (Hours): 1  Control VTE (exp VT): 605    Physical Exam   Physical Exam  Vitals and nursing note " reviewed.   Constitutional:       General: He is awake.      Appearance: He is normal weight. He is ill-appearing. He is not diaphoretic.   HENT:      Head: Normocephalic and atraumatic.      Right Ear: External ear normal.      Left Ear: External ear normal.      Nose: Nose normal. No congestion or rhinorrhea.      Comments: Nasal core track in place     Mouth/Throat:      Mouth: Mucous membranes are moist.      Pharynx: No oropharyngeal exudate.   Eyes:      General: No scleral icterus.     Conjunctiva/sclera: Conjunctivae normal.      Pupils: Pupils are equal, round, and reactive to light.   Neck:      Comments: Bilateral IJ central venous catheters without hematoma  Cardiovascular:      Rate and Rhythm: Normal rate and regular rhythm. Occasional extrasystoles are present.     Chest Wall: PMI is displaced.      Pulses: Decreased pulses.           Radial pulses are 1+ on the right side and 1+ on the left side.      Heart sounds: No murmur heard.  Pulmonary:      Effort: Tachypnea present. No accessory muscle usage.      Breath sounds: Examination of the right-lower field reveals rhonchi and rales. Examination of the left-lower field reveals rhonchi and rales. Rhonchi and rales present. No wheezing.      Comments: Plateau pressures low 20s, minimal secretions  Abdominal:      General: Bowel sounds are normal. There is no distension.      Palpations: Abdomen is soft.      Tenderness: There is no abdominal tenderness. There is no guarding.   Genitourinary:     Comments: Epps catheter in place  Musculoskeletal:      Cervical back: Neck supple. No rigidity.      Right lower leg: Edema present.      Left lower leg: Edema present.      Comments: Bilateral lower extremity erythema and purplish discoloration with wounds, minimally tender to palpation   Skin:     General: Skin is warm and dry.      Capillary Refill: Capillary refill takes 2 to 3 seconds.      Coloration: Skin is not pale.   Neurological:      General: No  focal deficit present.      Mental Status: He is alert.      Cranial Nerves: No cranial nerve deficit.      Comments: Follows commands, moves all extremities   Psychiatric:         Behavior: Behavior is cooperative.         Medications  Current Facility-Administered Medications   Medication Dose Route Frequency Provider Last Rate Last Admin   • sodium phosphate 30 mmol in dextrose 5% 500 mL ivpb  30 mmol Intravenous Once Jeremy M Gonda, M.D. 83.3 mL/hr at 05/31/22 1028 30 mmol at 05/31/22 1028   • furosemide (LASIX) injection 80 mg  80 mg Intravenous BID DIURETIC Live Hirsch M.D.   80 mg at 05/31/22 1257   • tacrolimus (PROGRAF) 1 mg/mL oral suspension 1 mg  1 mg Enteral Tube Q12HRS Live Hirsch M.D.   1 mg at 05/31/22 1257   • vasopressin (VASOSTRICT) 20 Units in  mL Infusion  0.03 Units/min Intravenous Continuous Jeremy M Gonda, M.D. 9 mL/hr at 05/31/22 1435 0.03 Units/min at 05/31/22 1435   • oxyCODONE immediate-release (ROXICODONE) tablet 5 mg  5 mg Enteral Tube Q4HRS PRN Jeremy M Gonda, M.D.   5 mg at 05/30/22 1159   • insulin regular (HumuLIN R) 100 Units in  mL Infusion  0-29 Units/hr Intravenous Continuous Reuben Swartz M.D. 6 mL/hr at 05/31/22 1230 6 Units/hr at 05/31/22 1230   • dextrose 50% (D50W) injection 12.5-25 g  12.5-25 g Intravenous PRN Reuben Swartz M.D.       • norepinephrine (Levophed) infusion 32 mg/500 mL (premix)  0-100 mcg/min Intravenous Continuous Reuben Swartz M.D.   Stopped at 05/31/22 1233   • meropenem (Merrem) 500 mg in  mL IV-MBP  500 mg Intravenous DAILY Vivian Francois M.D.   Stopped at 05/30/22 1836   • artificial tears (EYE LUBRICANT) ophth ointment 1 Application  1 Application Both Eyes Q8HRS Reuben Swartz M.D.   1 Application at 05/30/22 0536   • heparin injection 2,800 Units  2,800 Units Intracatheter DIALYSIS PRN James Sheppard M.D.   2,800 Units at 05/31/22 1300   • acetaminophen (Tylenol) tablet 650 mg  650 mg Enteral Tube Q6HRS  PRN SHARON Vickers   650 mg at 05/28/22 0515   • Pharmacy Consult: Enteral tube insertion - review meds/change route/product selection  1 Each Other PHARMACY TO DOSE Reuben Swartz M.D.       • Respiratory Therapy Consult   Nebulization Continuous RT Reuben Swartz M.D.       • famotidine (PEPCID) tablet 20 mg  20 mg Enteral Tube DAILY Reuben Swartz M.D.   20 mg at 05/30/22 0536    Or   • famotidine (PEPCID) injection 20 mg  20 mg Intravenous DAILY Reuben Swartz M.D.   20 mg at 05/31/22 0548   • senna-docusate (PERICOLACE or SENOKOT S) 8.6-50 MG per tablet 2 Tablet  2 Tablet Enteral Tube BID Reuben Swartz M.D.   2 Tablet at 05/31/22 0548    And   • polyethylene glycol/lytes (MIRALAX) PACKET 1 Packet  1 Packet Enteral Tube QDAY PRN Reuben Swartz M.D.        And   • magnesium hydroxide (MILK OF MAGNESIA) suspension 30 mL  30 mL Enteral Tube QDAY PRN Reuben Swartz M.D.        And   • bisacodyl (DULCOLAX) suppository 10 mg  10 mg Rectal QDAY PRN Reuben Swartz M.D.       • lidocaine (XYLOCAINE) 1 % injection 2 mL  2 mL Tracheal Tube Q30 MIN PRN Reuben Swartz M.D.       • sodium chloride (OCEAN) 0.65 % nasal spray 2 Spray  2 Spray Nasal Q2HRS PRN Kayy Hopkins M.D.       • dakins 0.125% (1/4 strength) topical soln   Topical BID Reuben Swartz M.D.   473 mL at 05/31/22 0549   • hydrocortisone sodium succinate PF (Solu-CORTEF) 100 MG injection 50 mg  50 mg Intravenous Q6HRS Reuben Swartz M.D.   50 mg at 05/31/22 1256   • HYDROmorphone (Dilaudid) injection 0.5 mg  0.5 mg Intravenous Q2HRS PRN Tracey Whitfield M.D.   0.5 mg at 05/31/22 0059       Fluids    Intake/Output Summary (Last 24 hours) at 5/31/2022 1509  Last data filed at 5/31/2022 1255  Gross per 24 hour   Intake 1665.93 ml   Output 5750 ml   Net -4084.07 ml       Laboratory  Recent Labs     05/29/22  0805 05/30/22  0200   ISTATAPH 7.564* 7.548*   ISTATAPCO2 28.3 35.9   ISTATAPO2 72 55*   ISTATATCO2 26 32    FAKLHYN3MTL 97 92*   ISTATARTHCO3 25.5* 31.2*   ISTATARTBE 4* 8*   ISTATTEMP see below 97.7 F   ISTATFIO2 30 30   ISTATSPEC Arterial Arterial   ISTATAPHTC  --  7.556*   OUJUVKLE5MP  --  54*         Recent Labs     05/29/22  0420 05/29/22  1245 05/29/22 2056 05/30/22 0043 05/30/22  0505 05/30/22  0940 05/30/22  1920 05/31/22  0002 05/31/22 0411   SODIUM 136   < > 136 136 137  --   --   --  137   POTASSIUM 3.6   < > 3.6 4.2 4.0  --   --   --  5.4   CHLORIDE 100   < > 98 97 98  --   --   --  100   CO2 22   < > 25 26 26  --   --   --  24   BUN 57*   < > 45* 52* 58*  --   --   --  68*   CREATININE 3.31*   < > 2.72* 2.93* 3.20*  --   --   --  3.29*   MAGNESIUM 1.7  --  1.6 1.7  --   --   --   --   --    PHOSPHORUS 0.9*  --  2.1* 1.8*  --  1.8* 2.1* 1.8*  --    CALCIUM 8.1*   < > 7.8* 7.8* 8.0*  --   --   --  8.0*    < > = values in this interval not displayed.     Recent Labs     05/30/22 0043 05/30/22 0505 05/30/22 0940 05/31/22 0411   ALTSGPT 29  --   --   --    ASTSGOT 18  --   --   --    ALKPHOSPHAT 156*  --   --   --    TBILIRUBIN 0.5  --   --   --    PREALBUMIN  --   --  9.2*  --    GLUCOSE 203* 218*  --  239*     Recent Labs     05/29/22 0420 05/30/22 0043 05/30/22 0505 05/31/22 0411   WBC 20.6*  --  20.3* 19.2*   NEUTSPOLYS 90.40*  --  87.00* 89.40*   LYMPHOCYTES 0.00*  --  2.60* 3.50*   MONOCYTES 4.40  --  7.80 5.30   EOSINOPHILS 0.00  --  0.00 0.00   BASOPHILS 0.00  --  0.00 0.00   ASTSGOT  --  18  --   --    ALTSGPT  --  29  --   --    ALKPHOSPHAT  --  156*  --   --    TBILIRUBIN  --  0.5  --   --      Recent Labs     05/29/22  0420 05/30/22  0505 05/31/22  0411   RBC 4.85 4.70 4.36*   HEMOGLOBIN 14.2 13.6* 12.5*   HEMATOCRIT 42.1 41.2* 38.8*   PLATELETCT 50* 49* 46*       Imaging  X-Ray:  I have personally reviewed the images and compared with prior images. and My impression is: Unchanged bilateral lower lobe infiltrate/atelectasis with edema pattern, endotracheal tube/gastric tube/left  hemodialysis catheter in good position.  Right IJ hemodialysis catheter 6 cm to deep  CT:    Reviewed  Echo:   Reviewed  Ultrasound:  Reviewed    Assessment/Plan  * Septic shock (HCC)- (present on admission)  Assessment & Plan  Concerns for Toxic shock syndrome with his erythroderma  Empiric rx clinda + zosyn + linezolid transitioned to Zosyn guided by ID with his E Coli bacteremia. Monitor need for surgical debridement appreciate consultation  S/p sepsis protocol and aggressive fluid resuscitation  Continue to titrate vasopressor support to maintain MAP >65 (added vasopressin)  Continue stress dose steroids  ID consulted  Follow-up on cultures  Wound care    Knee effusion, right  Assessment & Plan  S/p arthrocentesis 5/28  Follow-up on final fluid culture    Cardiac arrest (HCC)  Assessment & Plan  PEA arrest 5/27  Continue supportive care  Monitor neurologic function    Acute metabolic encephalopathy  Assessment & Plan  Secondary to sepsis -improving  Close neurologic monitoring  Limit sedatives    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 10 day course of meropenem per ID (5/28~)  Follow-up on repeat blood cultures    Acute respiratory failure with hypoxia (HCC)  Assessment & Plan  Intubated 5/26.  Extubated May 31   RT/O2 protocol  Limit sedatives, add oxycodone as needed pain  Dialysis for fluid removal    PAF (paroxysmal atrial fibrillation) (HCC)- (present on admission)  Assessment & Plan  hx of continue to optimize  filling pressure and electrolytes, hr is rate controlled,   Unable to tolerate anticoagulation due to thrombocytopenia  S/p trial of IV digoxin 5/28, hold additional for now  Optimize electrolytes, continuous telemetry monitoring    Type 2 diabetes mellitus with hyperlipidemia (HCC)- (present on admission)  Assessment & Plan  With uncontrolled hyperglycemia  Continue to titrate insulin drip  Diabetic enteral nutrition monitoring glucose closely  Continue statin    Thrombocytopenia (HCC)- (present on admission)  Assessment & Plan  Acute on chronic secondary to sepsis  Holding systemic anticoagulation for DVT prophylaxis  Monitor for bleeding, daily CBC    Acute renal failure superimposed on stage 3a chronic kidney disease (HCC)  Assessment & Plan  Hx of CKD s/p renal transplant, acute renal injury related to septic shock and ischemic atn s/p contrast die load  Avoid nephrotoxins  Continue Lasix  Nephrology consulted  Continue daily dialysis for now

## 2022-05-31 NOTE — CARE PLAN
The patient is Watcher - Medium risk of patient condition declining or worsening    Shift Goals  Clinical Goals: maintain MAP > 65, titrate down on Levo as tolerated  Patient Goals: JUDITH  Family Goals: updates    Progress made toward(s) clinical / shift goals:  MAP goals achieved, levo titrated down    Patient is not progressing towards the following goals:  N/a      Problem: Knowledge Deficit - Standard  Goal: Patient and family/care givers will demonstrate understanding of plan of care, disease process/condition, diagnostic tests and medications  Outcome: Progressing     Problem: Pain - Standard  Goal: Alleviation of pain or a reduction in pain to the patient’s comfort goal  Outcome: Progressing     Problem: Skin Integrity  Goal: Skin integrity is maintained or improved  Outcome: Progressing     Problem: Fall Risk  Goal: Patient will remain free from falls  Outcome: Progressing     Problem: Safety - Medical Restraint  Goal: Remains free of injury from restraints (Restraint for Interference with Medical Device)  Outcome: Progressing

## 2022-05-31 NOTE — PROGRESS NOTES
[facility] Progress Note      CVC not drawing from either port. Attempted to reposition pt with no effect. Neph MD notified and gave orders to instill Cathflo per amount in each limb and leave for 1 hour prior to attempting to draw from port again. Cathflo instilled at 0820. Will instill for 1 hour. Primary RN notified.

## 2022-05-31 NOTE — PROGRESS NOTES
"[facility] -  PROGRESS NOTE               Author: Live Hirsch M.D. Date & Time: 5/31/2022  10:43 AM     HPI:  Patient is a 66 year old male with a PMHx of renal transplant 9/10/2010 for polycystic kidney disease, DM, thrombocytopenia, CKD3b, pafib, covid19 infection, who presents for AMS.  He was brought in activated trauma for fall, CT imaging has been negative, but was in severe septic shock started on pressors and give nfluid boluses.  On broad spectrum abx.  Currently complains of right leg pain but thinks its better.  Confirms his last dose of tacrolimus and cellcept was yesterday.  Currently on levophed    DAILY NEPHROLOGY SUMMARY:  5/24: Consult done  5/25: NAEO, no complaints, feels a bit better, family at bedside  5/26: Decompensated overnight, intubated due to resp. Distress and pressors initiated  5/27: NAEO, no complaints, hemodynamics decompensated and CRRT initiated instead, tolerated well overnight, still on it this AM  5/28: transitioned to daily iHD for now, +13.8L for hospitalization, remains intubated, on pressor support, Tmax 101.1F, WBC 28.9  5/29: tolerated HD, UF 3L 5/28, remains intubated and on pressor support  5/30: Tolerated UF, improved hemodynamics, remains on pressors, remains on mechanical ventilation   5/31: Seen on Dialysis, vasc cath sluggish despite TPA, only able to run     REVIEW OF SYSTEMS:    Unable to obtain, intubated    PMH/PSH/SH/FH:   Reviewed and unchanged since admission note    CURRENT MEDICATIONS:   Reviewed from admission to present day    VS:  VS:  /74   Pulse 90   Temp 36.3 °C (97.4 °F) (Temporal)   Resp (!) 37   Ht 1.956 m (6' 5\")   Wt (!) 128 kg (282 lb 3 oz)   SpO2 100%   BMI 33.46 kg/m²   GENERAL: Ill appearing  CV: +pedal edema  RESP:mechanical breath sounds  GI: Soft  MSK: No joint deformities   SKIN: ecchymoses  NEURO: No tremor  PSYCH: Deferred    Fluids:  In: 2909.1 [I.V.:489.3; Other:150; Dialysis:600; " Enteral:90]  Out: 6225     LABS:  Recent Labs     05/30/22  0043 05/30/22  0505 05/31/22  0411   SODIUM 136 137 137   POTASSIUM 4.2 4.0 5.4   CHLORIDE 97 98 100   CO2 26 26 24   GLUCOSE 203* 218* 239*   BUN 52* 58* 68*   CREATININE 2.93* 3.20* 3.29*   CALCIUM 7.8* 8.0* 8.0*     IMAGING:   All imaging reviewed from admission to present day    IMPRESSION:  # SANKET  - Etiology likely 2/2 ATN  - has received contrast  - Non-oliguric, but UOP dropping it seems  - TempCath placed by ICU  # DDKT  - 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 Coli bacteremia/septic shock  - Source unclear, ?colitis on imaging  - Abx on board  - pressor support  # Hyperkalemia, improved  - No ECG changes  # Encephalopathy  - Etiology likely 2/2 hypoperfusion/sepsis  # Acute respiratory failure  - Intubated 5/26  # RLE ulcer  - Trauma  # type 2 DM  # thrombocytopenia  - worsening    PLAN:  - HD again today. May need to replace vasc cath going forward  -start furosemide 80mg IV BID  -restart tacrolimus 1mg BID, holding MMF for now  -stress dose steroids  - Daily labs and weights  - Monitor I/O  - Maintain MAP > 65  - Dose all meds per eGFR < 15    Thank you

## 2022-05-31 NOTE — CARE PLAN
Problem: Ventilation  Goal: Ability to achieve and maintain unassisted ventilation or tolerate decreased levels of ventilator support  Description: Target End Date:  4 days     Document on Vent flowsheet    1.  Support and monitor invasive and noninvasive mechanical ventilation  2.  Monitor ventilator weaning response  3.  Perform ventilator associated pneumonia prevention interventions  4.  Manage ventilation therapy by monitoring diagnostic test results  Outcome: Progressing      Ventilator Daily Summary    Vent Day # 5    Ventilator settings changed this shift: 20 530 +10 30%    Weaning trials: yes x 2, 1.6/-18/35    Respiratory Procedures: none     Plan: Continue current ventilator settings and wean mechanical ventilation as tolerated per physician orders.

## 2022-05-31 NOTE — PROGRESS NOTES
Infectious Disease Progress Note    Author: Tania Briggs M.D. Date & Time of service: 2022  12:26 PM    Chief Complaint:  Follow-up for septic shock, E. coli bacteremia, right lower extremity cellulitis    Interval History:   afebrile, WBC 24.5, blood cultures now growing E. Coli, meningitis CSF PCR panel not detected.  Patient remains on pressors.  Patient was noted to be obtunded with respiratory distress early this morning and has not now been intubated.  Renal function worse today.  Daughter and girlfriend at bedside with questions   T-max 99.7 WBC 26.8, remains on multiple pressors with increasing requirements overnight, remains on the vent however oxygenation requirements improving, renal function improving on CRRT   T-max 101.1 WBC 28.9.  Patient developed PEA arrest and underwent CPR yesterday.  White count worsening.  Remains on pressors.  Chest x-ray worse this morning.  CT of the right lower extremity shows right knee joint effusion and circumferential edema involving the soft tissues of the right lower leg.  CT abdomen and pelvis some wall thickening in the descending colon related to underdistention or colitis.  Remains sedated.  Ortho PA bedside performing right knee joint aspiration-fluid is cloudy yellow   afebrile, WBC 20.6 joint aspiration fluid not consistent with infection, remains on the vent however FiO2 decreased to 30%.  Remains on pressors but decreasing requirements.  Sedated   AF WBC 20.3 getting dialysis. Remains intubated on pressors   AF WBC 19.2 more awake today feet warmer Culture results and plan of care reviewed with patient and family. Remains intubated on pressors    Labs Reviewed, Medications Reviewed and Wound Reviewed.    Review of Systems:  Review of Systems   Unable to perform ROS: Intubated   Constitutional: Negative for fever.   Skin: Negative for rash.       Hemodynamics:  Temp (24hrs), Av.2 °C (97.2 °F), Min:35.9 °C (96.7 °F),  Max:36.4 °C (97.5 °F)  Temperature: 36.3 °C (97.4 °F)  Pulse  Av.3  Min: 43  Max: 159   Blood Pressure : 105/74, Arterial BP: 115/56       Physical Exam:  Physical Exam  Vitals and nursing note reviewed.   Constitutional:       General: He is not in acute distress.     Appearance: He is ill-appearing. He is not toxic-appearing or diaphoretic.   HENT:      Nose: No rhinorrhea.      Mouth/Throat:      Comments: ET tube  Eyes:      General: No scleral icterus.     Extraocular Movements: Extraocular movements intact.      Pupils: Pupils are equal, round, and reactive to light.      Comments: Mild erythema   Neck:      Comments: Right IJ catheter    Left IJ HD catheter  Cardiovascular:      Rate and Rhythm: Tachycardia present.   Pulmonary:      Effort: Pulmonary effort is normal. No respiratory distress.   Abdominal:      General: There is no distension.      Palpations: Abdomen is soft.   Musculoskeletal:      Cervical back: Neck supple. No rigidity.      Right lower leg: Edema present.      Comments: Right knee surgical scar    Open wound on the anterior aspect of the right lower extremity dressed     Right knee swelling   Skin:     General: Skin is warm.      Coloration: Skin is not jaundiced.      Findings: Bruising present.   Neurological:      General: No focal deficit present.      Mental Status: He is alert.   Psychiatric:         Mood and Affect: Mood normal.         Meds:    Current Facility-Administered Medications:   •  sodium phosphate ivpb  •  furosemide  •  tacrolimus  •  vasopressin (PITRESSIN) infusion  •  oxyCODONE immediate-release  •  insulin regular 100 units in 100 mL 0.9% NaCl infusion  •  dextrose bolus  •  norepinephrine (Levophed) infusion  •  meropenem  •  artificial tears  •  heparin  •  acetaminophen  •  Pharmacy  •  Respiratory Therapy Consult  •  famotidine **OR** famotidine  •  senna-docusate **AND** polyethylene glycol/lytes **AND** magnesium hydroxide **AND** bisacodyl  •   lidocaine  •  sodium chloride  •  dakins 0.125% (1/4 strength)  •  hydrocortisone sodium succinate PF  •  HYDROmorphone    Labs:  Recent Labs     05/29/22  0420 05/30/22  0505 05/31/22  0411   WBC 20.6* 20.3* 19.2*   RBC 4.85 4.70 4.36*   HEMOGLOBIN 14.2 13.6* 12.5*   HEMATOCRIT 42.1 41.2* 38.8*   MCV 86.8 87.7 89.0   MCH 29.3 28.9 28.7   RDW 47.5 49.2 50.3*   PLATELETCT 50* 49* 46*   NEUTSPOLYS 90.40* 87.00* 89.40*   LYMPHOCYTES 0.00* 2.60* 3.50*   MONOCYTES 4.40 7.80 5.30   EOSINOPHILS 0.00 0.00 0.00   BASOPHILS 0.00 0.00 0.00   RBCMORPHOLO Present Present Present     Recent Labs     05/30/22  0043 05/30/22  0505 05/31/22  0411   SODIUM 136 137 137   POTASSIUM 4.2 4.0 5.4   CHLORIDE 97 98 100   CO2 26 26 24   GLUCOSE 203* 218* 239*   BUN 52* 58* 68*     Recent Labs     05/28/22  1445 05/28/22  2130 05/30/22  0043 05/30/22  0505 05/31/22  0411   ALBUMIN 2.3*  --  2.3*  --   --    TBILIRUBIN 0.8  --  0.5  --   --    ALKPHOSPHAT 128*  --  156*  --   --    TOTPROTEIN 5.4*  --  5.3*  --   --    ALTSGPT 29  --  29  --   --    ASTSGOT 27  --  18  --   --    CREATININE 2.58*   < > 2.93* 3.20* 3.29*    < > = values in this interval not displayed.       Imaging:  CT-CSPINE WITHOUT PLUS RECONS    Result Date: 5/24/2022 5/24/2022 11:38 AM HISTORY/REASON FOR EXAM: Fall and neck pain TECHNIQUE/EXAM DESCRIPTION: CT cervical spine without contrast, with reconstructions. The study was performed on a helical multidetector CT scanner. Thin-section helical scanning was performed from the skull base through T1. Sagittal and coronal multiplanar reconstructions were generated from the axial images. Low dose optimization technique was utilized for this CT exam including automated exposure control and adjustment of the mA and/or kV according to patient size. COMPARISON:  None. FINDINGS: Alignment in the cervical spine is normal. There is no fracture or dislocation. The craniovertebral junction appears intact. The prevertebral and  paraspinous soft tissues are unremarkable. There is moderate disc space. C6-7 level with marginal osteophytosis and moderate posterior spurring. The superior mediastinum and lung apices in the field of view are unremarkable.     1.  Moderate osteoarthritic changes at the C6-7 level with disc space narrowing and marginal osteophytosis. Further there is moderate cervical spondylotic changes at this level. 2.  No evidence of cervical spine fracture and/or subluxation.    CT-ABDOMEN-PELVIS WITH    Result Date: 5/24/2022 5/24/2022 11:39 AM HISTORY/REASON FOR EXAM:  trauma red. TECHNIQUE/EXAM DESCRIPTION:   CT scan of the abdomen and pelvis with contrast. Contrast-enhanced helical scanning was obtained from the diaphragmatic domes through the pubic symphysis following the bolus administration of nonionic contrast without complication. 75 mL of Omnipaque 350 nonionic contrast was administered without complication. Low dose optimization technique was utilized for this CT exam including automated exposure control and adjustment of the mA and/or kV according to patient size. COMPARISON: Complete abdominal sonogram, 9/14/2021. FINDINGS: Lower Chest: Dependent atelectasis in the lung bases. No pleural effusion or pneumothorax. No pericardial effusion. Liver: Normal. Spleen: Unremarkable. Pancreas: Unremarkable. Gallbladder: No calcified stones. Biliary: Nondilated. Adrenal glands: Normal. Kidneys: Absent right kidney. Right lower quadrant transplant kidney without hydronephrosis. Enlarged left kidney with 2 numerous to count fluid attenuation lesions throughout the cortex. Nonobstructing stones in the lower pole of the left kidney. No hydronephrosis or hydroureter. Bowel: Sigmoid colon diverticulosis, without diverticulitis. Normal appendix. Lymph nodes: No adenopathy. Vasculature: Calcified atherosclerotic plaques in the aorta and iliac arteries, without aneurysmal dilation. Peritoneum: Unremarkable without ascites.  Musculoskeletal: No acute or destructive process. Multilevel lumbar spondylosis. Pelvis: The bladder is decompressed by an indwelling Epps catheter.. Small umbilical hernia containing fat.     1. No acute posttraumatic findings in the abdomen or pelvis. 2. Bibasilar subsegmental atelectasis. 3. Right pelvic transplant kidney without hydronephrosis. 4. Polycystic left kidney. 5. Diverticulosis without diverticulitis. Normal appendix.    CT-EXTREMITY, LOWER W/O RIGHT    Result Date: 5/24/2022 5/24/2022 4:17 PM HISTORY/REASON FOR EXAM:  Soft tissue infection suspected, lower leg, no prior imaging; necrotizing fasciitis to right lower ext, recieved large amounts of contrast already with renal transplant. TECHNIQUE/EXAM DESCRIPTION AND NUMBER OF VIEWS: CT scan of the RIGHT lower extremity without contrast and including reconstructions. Thin-section noncontrast helical images were obtained. Coronal and sagittal reconstructions were generated from the axial images. Up to date radiation dose reduction adjustments have been utilized to meet ALARA standards for radiation dose reduction. COMPARISON: None. FINDINGS: Right total knee replacement. There is decreased bony mineralization of the right lower extremity. There is no evidence of ulceration or soft tissue mass in the right lower extremity. There is no evidence of large abscess formation. There is diffuse atherosclerotic calcification of the right superficial femoral artery. There are curvilinear regions of soft tissue attenuation throughout the subcutaneous tissues of the right mid and distal lower leg.     1.  Status post right total knee replacement. 2.  Diffuse atherosclerotic calcification of the arterial system of the right lower extremity suspicious for diabetes mellitus. 3.  Soft tissue attenuation in the subcutaneous tissues of the mid to distal lower leg and foot suspicious for cellulitis. 4.  No evidence of soft tissue ulceration in the right lower leg or  foot. 5.  No evidence of acute osteomyelitis in the right lower leg or foot. 6.  Small subcutaneous abscesses cannot be excluded without the use of intravenous contrast.    CT-HEAD W/O    Result Date: 5/24/2022 5/24/2022 11:38 AM HISTORY/REASON FOR EXAM:  trauma red. TECHNIQUE/EXAM DESCRIPTION AND NUMBER OF VIEWS: CT of the head without contrast. The study was performed on a helical multidetector CT scanner. Contiguous axial sections were obtained from the skull base through the vertex. Up to date radiation dose reduction adjustments have been utilized to meet ALARA standards for radiation dose reduction. COMPARISON:  None available FINDINGS: The calvariae and skull base are unremarkable. There are no extraaxial fluid collections. There is a pattern of cerebral atrophy manifest as enlargement of sulcal markings and ventricular prominence. The ventricular system and basal cisterns are otherwise unremarkable. There are no areas of abnormal density in the brain substance. There are no hemorrhagic lesions. There are no mass effects or shift of midline structures. The brainstem and posterior fossa structures are unremarkable. Paranasal sinuses in the field of view are unremarkable. Mastoids in the field of view are unremarkable.     1.  Cerebral atrophy. 2.  Otherwise, Head CT without contrast within normal limits. No evidence of acute cerebral infarction, hemorrhage or mass lesion.     DX-CHEST-LIMITED (1 VIEW)    Result Date: 5/25/2022 5/25/2022 7:52 AM HISTORY/REASON FOR EXAM:  Central line placement TECHNIQUE/EXAM DESCRIPTION AND NUMBER OF VIEWS: Single portable view of the chest. COMPARISON: Chest radiography, 5/25/2022 at 0719 hours FINDINGS: Lungs: Symmetric low lung volumes. Stable linear and patchy opacities are unchanged. Stable small left pleural effusion. The right costophrenic recess is sharp. No visible pneumothorax bilaterally. Mediastinum: Cardiac silhouette size remains enlarged. Other: The right  internal jugular central venous access catheter placed in the interval terminates over the right atrium. The remaining visualized bones and soft tissues are stable radiographically.     1. Right internal jugular central venous access catheter placed in the interval terminates over the upper aspect of the right atrium. No postprocedure visible pneumothorax. 2. Stable patchy parenchymal opacities in the lungs, with a stable small left pleural effusion.    DX-CHEST-LIMITED (1 VIEW)    Result Date: 5/24/2022 5/24/2022 11:26 AM HISTORY/REASON FOR EXAM:  Chest Pain. TECHNIQUE/EXAM DESCRIPTION AND NUMBER OF VIEWS: Single AP view of the chest. COMPARISON:  Chest x-ray 11/13/2020 FINDINGS: Lungs:  There are curvilinear opacities in the lung fields bilaterally most pronounced in the perihilar regions. Pleura:  There is no pleural effusion or pneumothorax. Heart and mediastinum:  The heart silhouette is mildly enlarged Bones:  Normal     1.  Curvilinear perihilar opacities likely atelectasis and/or parenchymal scarring. 2.  Mild cardiomegaly.    DX-CHEST-PORTABLE (1 VIEW)    Result Date: 5/25/2022 5/25/2022 7:12 AM HISTORY/REASON FOR EXAM: Shortness of Breath TECHNIQUE/EXAM DESCRIPTION:  Single AP view of the chest. COMPARISON: Yesterday FINDINGS: Cardiomegaly is observed. The mediastinal contour appears within normal limits.  The central  pulmonary vasculature appears prominent and indistinct. Bilateral lung volumes are diminished.  Diffuse scattered hazy pulmonary parenchymal opacities are seen. Blunting of bilateral costophrenic angles is seen, compatible with trace bilateral pleural effusions. The bony structures appear age-appropriate.     1.  Pulmonary edema and/or infiltrates are identified, which are stable since the prior exam. 2.  Trace bilateral pleural effusions 3.  Cardiomegaly    DX-CHEST-PORTABLE (1 VIEW)    Result Date: 5/24/2022 5/24/2022 1:00 PM HISTORY/REASON FOR EXAM:  Central line placement.  TECHNIQUE/EXAM DESCRIPTION AND NUMBER OF VIEWS: Single portable view of the chest. COMPARISON: Chest radiograph, 5/24/2022 at 1147 hours FINDINGS: Lungs: Stable curvilinear opacities in the lung fields, greatest in the perihilar regions, stable when compared to prior study. Stable bibasal parenchymal volume loss. No large volume pleural effusion or visible pneumothorax. No pulmonary vascular congestion or interstitial edema. Mediastinum: The cardiac silhouette size remains enlarged. Other: The left internal jugular central venous access catheter placed in the interval terminates over a persistent left superior vena cava. The remaining visualized bones and soft tissues are stable radiographically.     Left internal jugular central venous access catheter terminates over a persistent left superior vena cava. No postprocedure visible pneumothorax. The remainder is stable.    DX-PELVIS-1 OR 2 VIEWS    Result Date: 5/24/2022 5/24/2022 11:27 AM HISTORY/REASON FOR EXAM:  Pelvic/Hip Pain Following Trauma. TECHNIQUE/EXAM DESCRIPTION AND NUMBER OF VIEWS:  1 view(s) of the pelvis. COMPARISON:  None. FINDINGS: There is normal bony mineralization of the pelvis. There is no evidence of pelvic fracture or osseous lesion. The sacroiliac joints and pubic symphysis are symmetrical.     1.  Unremarkable single AP view of the pelvis.    DX-TIBIA AND FIBULA RIGHT    Result Date: 5/24/2022 5/24/2022 12:17 PM HISTORY/REASON FOR EXAM:  Pain/Deformity Following Trauma. TECHNIQUE/EXAM DESCRIPTION AND NUMBER OF VIEWS:  2 views of the RIGHT tibia and fibula. COMPARISON: Right knee radiography, 12/18/2006 FINDINGS: Bones: Postsurgical changes in the distal right femur and proximal right tibia. Normal bone mineralization. No focal bone lesion. No acute fracture. Joint: Postsurgical changes of right total knee arthroplasty. Ankle mortise is preserved. No subluxation. Soft tissue: Arteriosclerosis. Circumferential right lower leg soft tissue  swelling.     1. Right lower leg soft tissue swelling. 2. No acute fracture or subluxation. 3. Postsurgical changes of right total knee arthroplasty.    CT-CTA CHEST PULMONARY ARTERY W/ RECONS    Result Date: 5/24/2022 5/24/2022 11:38 AM HISTORY/REASON FOR EXAM:  Fall and chest pain TECHNIQUE/EXAM DESCRIPTION: CT angiogram scan for pulmonary embolism with contrast, with reconstructions. 1.25 mm helical sections were obtained from the lung apices through the lung bases following the rapid bolus administration of 75 mL of Omnipaque 350 nonionic contrast. Thin-section overlapping reconstruction interval was utilized. Coronal reconstructions were generated from the axial data. MIP post processing was performed and utilized for the interpretation. Low dose optimization technique was utilized for this CT exam including automated exposure control and adjustment of the mA and/or kV according to patient size. COMPARISON: None FINDINGS: Pulmonary Embolism: No. Main Pulmonary Arteries: No. Segmental branches: No. Subsegmental branches: No. Additional Comments: None. Lungs: There are curvilinear opacities dependently in the lung bases. Pleura: No pleural effusion. Nodes: No enlarged lymph nodes. Additional findings: Diffuse coronary artery calcification.     1.  No CT evidence of pulmonary emboli. 2.  Bibasilar atelectasis. 3.  No evidence of rib fracture. 4. Diffuse coronary artery calcification.    EC-ECHOCARDIOGRAM COMPLETE W/ CONT    Result Date: 5/25/2022  Transthoracic Echo Report Echocardiography Laboratory CONCLUSIONS No prior study is available for comparison. The left ventricular ejection fraction is visually estimated to be 35%. Unable to estimate pulmonary artery pressure due to an inadequate tricuspid regurgitant jet. CIARA FORRESTER Exam Date:         05/25/2022                    11:35 Exam Location:     Inpatient Priority:          Routine Ordering Physician:        ALEJANDRA FROST Referring Physician:  Sonographer:               Jack Lockwood                            RDCS, RVT Age:    66     Gender:    M MRN:    [id number] :    1955 BSA:    2.4    Ht (in):    77     Wt (lb):    235 Exam Type:     Complete Indications:     Shortness of breath ICD Codes:       786.05 CPT Codes:       90756 BP:   90     /   51     HR:   97 Technical Quality:       Technically difficult study -                          adequate information is obtained MEASUREMENTS  (Male / Female) Normal Values 2D ECHO LV Diastolic Diameter PLAX        4.6 cm                4.2 - 5.9 / 3.9 - 5.3 cm LV Systolic Diameter PLAX         4.1 cm                2.1 - 4.0 cm IVS Diastolic Thickness           1.2 cm                LVPW Diastolic Thickness          1.2 cm                LVOT Diameter                     2 cm                  Estimated LV Ejection Fraction    35 %                  LV Ejection Fraction MOD BP       38.5 %                >= 55  % LV Ejection Fraction MOD 4C       52.3 %                LV Ejection Fraction MOD 2C       40.7 %                IVC Diameter                      1.8 cm                DOPPLER AV Peak Velocity                  1.5 m/s               AV Peak Gradient                  9.5 mmHg              AV Mean Gradient                  5.7 mmHg              LVOT Peak Velocity                0.8 m/s               AV Area Cont Eq vti               1.6 cm2               MV Velocity Time Integral         13.5 cm               Mitral E Point Velocity           0.63 m/s              Mitral E to A Ratio               1.1                   MV Pressure Half Time             62.8 ms               MV Area PHT                       3.5 cm2               MV Deceleration Time              217 ms                TR Peak Velocity                  275 cm/s              PV Peak Velocity                  1.1 m/s               PV Peak Gradient                  4.6 mmHg              RVOT Peak Velocity                0.79 m/s               * Indicates values subject to auto-interpretation LV EF:  35    % FINDINGS Left Ventricle 3 ml of  contrast was used to evaluate for thrombus in the left ventricular apex. Normal left ventricular chamber size. Mild concentric left ventricular hypertrophy. Moderately reduced left ventricular systolic function. The left ventricular ejection fraction is visually estimated to be 35%.There is  regional wall motion abnormalities may be from old MI.indeterminate diastolic function. Right Ventricle Normal right ventricular size. Reduced right ventricular systolic function. Right Atrium Enlarged right atrium. Normal inferior vena cava size and inspiratory collapse. Left Atrium Normal left atrial size. Left atrial volume index is 21  mL/sq m. Mitral Valve The mitral valve is not well visualized. No mitral stenosis. Trace mitral regurgitation. Aortic Valve The aortic valve is not well visualized. No aortic valve stenosis. No aortic insufficiency. Tricuspid Valve The tricuspid valve is not well visualized. No tricuspid stenosis. Trace tricuspid regurgitation. Unable to estimate pulmonary artery pressure due to an inadequate tricuspid regurgitant jet. Pulmonic Valve The pulmonic valve is not well visualized. No pulmonic stenosis. Trace pulmonic insufficiency. Pericardium Normal pericardium without effusion. Aorta The ascending aorta diameter is 3.2  cm. Matthew Yoder MD (Electronically Signed) Final Date:     25 May 2022 14:37    [facility] (FOR ED USE ONLY)    Result Date: 5/24/2022 5/24/2022 12:01 PM HISTORY/REASON FOR EXAM:  Ground-level fall with traumatic injury TECHNIQUE/EXAM DESCRIPTION AND NUMBER OF VIEWS:  Limited ultrasound of the abdomen. FAST scan. Focused ultrasound of the 4 quadrants of the abdomen. COMPARISON: None FINDINGS: Focused ultrasound of the 4 quadrants of the abdomen demonstrates no evidence of free fluid in the 4 quadrants.     No free fluid seen in all 4 quadrants. Negative  "FAST scan.       Micro:  Results     Procedure Component Value Units Date/Time    FLUID CULTURE W/GRAM STAIN [[id number]] Collected: 05/28/22 1000    Order Status: Completed Specimen: Synovial Fluid Updated: 05/31/22 0736     Significant Indicator NEG     Source SYNO     Site SYNOVIAL FLUID     Culture Result No growth at 72 hours.     Gram Stain Result Few WBCs.  No organisms seen.      Narrative:      Collected By: [id number] JIM BEAN  Right knee  Collected By: [id number] JIM BEAN    BLOOD CULTURE [[id number]] Collected: 05/28/22 0800    Order Status: Completed Specimen: Blood from Peripheral Updated: 05/29/22 0707     Significant Indicator NEG     Source BLD     Site PERIPHERAL     Culture Result No Growth  Note: Blood cultures are incubated for 5 days and  are monitored continuously.Positive blood cultures  are called to the RN and reported as soon as  they are identified.      Narrative:      Per Hospital Policy: Only change Specimen Src: to \"Line\" if  specified by physician order.  Right Forearm/Arm    BLOOD CULTURE [[id number]] Collected: 05/28/22 0850    Order Status: Completed Specimen: Blood from Peripheral Updated: 05/29/22 0707     Significant Indicator NEG     Source BLD     Site PERIPHERAL     Culture Result No Growth  Note: Blood cultures are incubated for 5 days and  are monitored continuously.Positive blood cultures  are called to the RN and reported as soon as  they are identified.      Narrative:      Per Hospital Policy: Only change Specimen Src: to \"Line\" if  specified by physician order.  Right Forearm/Arm    GRAM STAIN [[id number]] Collected: 05/28/22 1000    Order Status: Completed Specimen: Synovial Updated: 05/28/22 1210     Significant Indicator .     Source SYNO     Site SYNOVIAL FLUID     Gram Stain Result Few WBCs.  No organisms seen.      Narrative:      Collected By: [id number] JIM BEAN  Right knee  Collected By: [id number] JIM BEAN    QUANT BRONCHIAL " "WASHING [[id number]] Collected: 05/26/22 0944    Order Status: Completed Specimen: Respirate Updated: 05/28/22 0958     Significant Indicator NEG     Source RESP     Site BRONCHOALVEOLAR LAVAGE     Culture Result 4,200 cfu/mL usual upper respiratory shaun  including yeast.  No clinically significant Staphylococcus aureus, Methicillin  Resistant Staphylococcus aureus, or Pseudomonas species  isolated.       Gram Stain Result Moderate WBCs.  Few Gram positive cocci.  <5% intracellular organisms.      Narrative:      Collected By: [id number] SANTOSH BUENO.  Collected By: [id number] SANTOSH BUENO.    CSF CULTURE [[id number]] Collected: 05/24/22 1325    Order Status: Completed Specimen: CSF from Tap Updated: 05/27/22 0946     Significant Indicator NEG     Source CSF     Site TAP     Culture Result No growth at 72 hours.     Gram Stain Result Rare WBCs.  No organisms seen.      GRAM STAIN [[id number]] Collected: 05/26/22 0944    Order Status: Completed Specimen: Respirate Updated: 05/26/22 1847     Significant Indicator .     Source RESP     Site BRONCHOALVEOLAR LAVAGE     Gram Stain Result Moderate WBCs.  Few Gram positive cocci.  <5% intracellular organisms.      Narrative:      Collected By: [id number] SANTOSH BUENO.  Collected By: [id number] SANTOSH BUENO.    Culture Respiratory W/ Grm Stn - BAL [[id number]]     Order Status: Completed Specimen: Respirate from Bronchoalveolar Lavage     BLOOD CULTURE x2 [[id number]]  (Abnormal) Collected: 05/24/22 1122    Order Status: Completed Specimen: Blood from Peripheral Updated: 05/26/22 0952     Significant Indicator POS     Source BLD     Site PERIPHERAL     Culture Result Growth detected by Bactec instrument. 05/24/2022  23:30      Escherichia coli  See previous culture for sensitivity report.      Narrative:      CALL  [place]  19 tel. [phone],  CALLED  19 tel. [phone] 05/24/2022, 23:31, RB PERF. RESULTS CALLED TO: RN  [id number]  Per Hospital Policy: Only change Specimen Src: to \"Line\" " "if  specified by physician order.  Left Hand    BLOOD CULTURE x2 [[id number]]  (Abnormal)  (Susceptibility) Collected: 05/24/22 1124    Order Status: Completed Specimen: Blood from Peripheral Updated: 05/26/22 0949     Significant Indicator POS     Source BLD     Site PERIPHERAL     Culture Result Growth detected by Bactec instrument. 05/24/2022  23:30      Escherichia coli    Narrative:      CALL  Crockett  19 tel. [phone],  CALLED  19 tel. [phone] 05/24/2022, 23:31, RB PERF. RESULTS CALLED TO:  [id number]  Per Hospital Policy: Only change Specimen Src: to \"Line\" if  specified by physician order.  Right Hand    Susceptibility     Escherichia coli (1)     Antibiotic Interpretation Microscan   Method Status    Ampicillin Resistant >16 mcg/mL CESIA Final    Ceftriaxone Sensitive <=1 mcg/mL CESIA Final    Cefazolin Sensitive <=2 mcg/mL CESIA Final    Ciprofloxacin Sensitive <=0.25 mcg/mL CESIA Final    Cefepime Sensitive <=2 mcg/mL CESIA Final    Cefuroxime Sensitive <=4 mcg/mL CESIA Final    Ampicillin/sulbactam Intermediate 16/8 mcg/mL CESIA Final    Ertapenem Sensitive <=0.5 mcg/mL CESIA Final    Tobramycin Sensitive <=2 mcg/mL CESIA Final    Gentamicin Sensitive <=2 mcg/mL CESIA Final    Minocycline Sensitive <=4 mcg/mL CESIA Final    Moxifloxacin Sensitive <=2 mcg/mL CESIA Final    Pip/Tazobactam Sensitive <=8 mcg/mL CESIA Final    Trimeth/Sulfa Resistant >2/38 mcg/mL CESIA Final    Tigecycline Sensitive <=2 mcg/mL CESIA Final                   RESP CULTURE [[id number]] Collected: 05/26/22 0944    Order Status: Canceled Specimen: Other from Bronchoalveolar Lavage     GRAM STAIN [[id number]] Collected: 05/24/22 1325    Order Status: Completed Specimen: CSF Updated: 05/24/22 1545     Significant Indicator .     Source CSF     Site TAP     Gram Stain Result Rare WBCs.  No organisms seen.      CULTURE WOUND W/ GRAM STAIN [[id number]]     Order Status: Canceled Specimen: Wound from Right Leg     URINALYSIS,CULTURE IF INDICATED [[id number]]  (Abnormal) " "Collected: 05/24/22 1245    Order Status: Sent Specimen: Urine, Cath Updated: 05/24/22 1323     Color Yellow     Character Clear     Specific Gravity 1.017     Ph 5.0     Glucose 500 mg/dL      Ketones Negative mg/dL      Protein Negative mg/dL      Bilirubin Negative     Urobilinogen, Urine 0.2     Nitrite Negative     Leukocyte Esterase Negative     Occult Blood Negative     Micro Urine Req see below     Comment: Microscopic examination not performed when specimen is clear  and chemically negative for protein, blood, leukocyte esterase  and nitrite.         Narrative:      Indication for culture:->Acute unexplained altered mental  status ONLY after ruling out other recognized cause    COV-2, FLU A/B, AND RSV BY PCR (2-4 HOURS CEPHEID): Collect NP swab in [facility] [[id number]] Collected: 05/24/22 1130    Order Status: Completed Specimen: Respirate Updated: 05/24/22 1243     Influenza virus A RNA Negative     Influenza virus B, PCR Negative     RSV, PCR Negative     SARS-CoV-2 by PCR NotDetected     Comment: PATIENTS: Important information regarding your results and instructions can  be found at [email or web address]   \"After your  Covid-19 Test\"    [facility] providers: PLEASE REFER TO DE-ESCALATION AND RETESTING PROTOCOL  on [email or web address]    **The [employer] GeneXpert Xpress SARS-CoV-2 RT-PCR Test has been made  available for use under the Emergency Use Authorization (EUA) only.          SARS-CoV-2 Source NP Swab          Assessment:  66 y.o. man with a history of uncontrolled type 2 diabetes mellitus, history of prior renal transplant on chronic immunosuppression, stage III chronic kidney disease, atrial fibrillation on anticoagulation, and prior right total knee arthroplasty admitted on 5/24/2022 secondary to altered mentation in the setting of a ground-level fall and hit his right shin.  Patient found to be in septic shock with gram-negative bacteremia and right lower extremity cellulitis " without any evidence of necrotizing fasciitis.  His right lower extremity cellulitis appears to be improving.    On the morning of 5/26 was noted to be obtunded with respiratory distress so was intubated.    Pertinent diagnoses:  Right knee joint effusion  Persistent fevers  Septic shock, remains on pressors  Ventilatory dependent respiratory failure, intubated on 5/26  Pneumonia  E. coli bacteremia, source possibly below  Right lower extremity cellulitis  Acute kidney injury on stage III chronic kidney disease, now on CRRT  Rhabdomyolysis  Leukocytosis, decreased  Thrombocytopenia, worse  Acute encephalopathy  Uncontrolled type 2 diabetes mellitus, hemoglobin A1c 10.7  History of right total knee arthroplasty  History of renal transplant on tacrolimus, mycophenolate and prednisone  Recent fall    Plan:    -Follow blood cultures on 5/24-E coli, resistant to ampicillin, intermediate resistance to Unasyn  -Repeat blood cultures on 5/28- negative to date  -Avoid nephrotoxins  -CSF cultures-negative  - BAL culture on 5/26-upper respiratory shaun  -Continue supportive care by primary team  -Nephrology following- now on dialysis  -Ultrasound of the right lower extremity with no stenosis or occlusion  -CT of the right lower extremity with right knee joint effusion  -s/p bedside right knee joint aspiration on 5/28.  Synovial fluid analysis reveals hazy priscila fluid, 2638 WBCs with PMN predominance and no crystals seen.  Fluid not consistent with infection.    -Follow joint fluid cultures-neg to date   - Continue IV meropenem (started on 5/28 given new persistent fevers and worsening chest x-ray)  Anticipate 10 day course unless complication. Stop date 6/6/2022      I have performed a physical exam and reviewed and updated ROS and plan today 5/31/2022.  In review of yesterday's note 5/30/2022, there are no changes except as documented above.

## 2022-05-31 NOTE — PROGRESS NOTES
[facility] Progress Note       HD ordered by Dr. Hirsch. Treatment started at 0925 and ended at 1255.    Net UF removed: 4000mL.     Pt tolerated treatment well. Waited for 2 hours prior to treatment initiation due to CVC line not working and having to instill Cathflo for 1 hour. Aspirated Cathflo from venous port but continued to be very sluggish, unable to aspirate from arterial port. Ran treatment at 100 BFR and slowly increased to a max of 200 BFR due to increased pressure alarms. Neph MD notified of catheter issues. See flow sheet for details. CVC patent, locked with Heparin 1:1000 units; 1.4mL to RED port and 1.4 mL to BLUE port post dialysis. No s/s of infection present. Dressing in place, CDI. Report given to LUZ MARIA Galarza RN.

## 2022-06-01 ENCOUNTER — APPOINTMENT (OUTPATIENT)
Dept: RADIOLOGY | Facility: MEDICAL CENTER | Age: 67
DRG: 870 | End: 2022-06-01
Attending: INTERNAL MEDICINE
Payer: MEDICARE

## 2022-06-01 ENCOUNTER — PATIENT OUTREACH (OUTPATIENT)
Dept: SCHEDULING | Facility: IMAGING CENTER | Age: 67
End: 2022-06-01
Payer: MEDICARE

## 2022-06-01 LAB
ANION GAP SERPL CALC-SCNC: 14 MMOL/L (ref 7–16)
ANISOCYTOSIS BLD QL SMEAR: ABNORMAL
BASOPHILS # BLD AUTO: 0 % (ref 0–1.8)
BASOPHILS # BLD: 0 K/UL (ref 0–0.12)
BUN SERPL-MCNC: 81 MG/DL (ref 8–22)
CALCIUM SERPL-MCNC: 7.8 MG/DL (ref 8.5–10.5)
CHLORIDE SERPL-SCNC: 97 MMOL/L (ref 96–112)
CO2 SERPL-SCNC: 25 MMOL/L (ref 20–33)
CREAT SERPL-MCNC: 3.52 MG/DL (ref 0.5–1.4)
EOSINOPHIL # BLD AUTO: 0 K/UL (ref 0–0.51)
EOSINOPHIL NFR BLD: 0 % (ref 0–6.9)
ERYTHROCYTE [DISTWIDTH] IN BLOOD BY AUTOMATED COUNT: 50.3 FL (ref 35.9–50)
GFR SERPLBLD CREATININE-BSD FMLA CKD-EPI: 18 ML/MIN/1.73 M 2
GLUCOSE BLD STRIP.AUTO-MCNC: 139 MG/DL (ref 65–99)
GLUCOSE BLD STRIP.AUTO-MCNC: 140 MG/DL (ref 65–99)
GLUCOSE BLD STRIP.AUTO-MCNC: 148 MG/DL (ref 65–99)
GLUCOSE BLD STRIP.AUTO-MCNC: 177 MG/DL (ref 65–99)
GLUCOSE BLD STRIP.AUTO-MCNC: 188 MG/DL (ref 65–99)
GLUCOSE BLD STRIP.AUTO-MCNC: 271 MG/DL (ref 65–99)
GLUCOSE BLD STRIP.AUTO-MCNC: 339 MG/DL (ref 65–99)
GLUCOSE SERPL-MCNC: 146 MG/DL (ref 65–99)
HCT VFR BLD AUTO: 37.8 % (ref 42–52)
HGB BLD-MCNC: 12.1 G/DL (ref 14–18)
LYMPHOCYTES # BLD AUTO: 0.73 K/UL (ref 1–4.8)
LYMPHOCYTES NFR BLD: 4.4 % (ref 22–41)
MACROCYTES BLD QL SMEAR: ABNORMAL
MANUAL DIFF BLD: NORMAL
MCH RBC QN AUTO: 28.7 PG (ref 27–33)
MCHC RBC AUTO-ENTMCNC: 32 G/DL (ref 33.7–35.3)
MCV RBC AUTO: 89.8 FL (ref 81.4–97.8)
METAMYELOCYTES NFR BLD MANUAL: 0.9 %
MONOCYTES # BLD AUTO: 0.73 K/UL (ref 0–0.85)
MONOCYTES NFR BLD AUTO: 4.4 % (ref 0–13.4)
MORPHOLOGY BLD-IMP: NORMAL
MYELOCYTES NFR BLD MANUAL: 0.9 %
NEUTROPHILS # BLD AUTO: 14.93 K/UL (ref 1.82–7.42)
NEUTROPHILS NFR BLD: 88.5 % (ref 44–72)
NEUTS BAND NFR BLD MANUAL: 0.9 % (ref 0–10)
NRBC # BLD AUTO: 0.05 K/UL
NRBC BLD-RTO: 0.3 /100 WBC
PLATELET # BLD AUTO: 77 K/UL (ref 164–446)
PLATELET BLD QL SMEAR: NORMAL
PMV BLD AUTO: 13.9 FL (ref 9–12.9)
POLYCHROMASIA BLD QL SMEAR: NORMAL
POTASSIUM SERPL-SCNC: 4.1 MMOL/L (ref 3.6–5.5)
RBC # BLD AUTO: 4.21 M/UL (ref 4.7–6.1)
RBC BLD AUTO: PRESENT
SODIUM SERPL-SCNC: 136 MMOL/L (ref 135–145)
TOXIC GRANULES BLD QL SMEAR: NORMAL
WBC # BLD AUTO: 16.7 K/UL (ref 4.8–10.8)

## 2022-06-01 PROCEDURE — 700111 HCHG RX REV CODE 636 W/ 250 OVERRIDE (IP): Performed by: INTERNAL MEDICINE

## 2022-06-01 PROCEDURE — 71045 X-RAY EXAM CHEST 1 VIEW: CPT

## 2022-06-01 PROCEDURE — 700102 HCHG RX REV CODE 250 W/ 637 OVERRIDE(OP): Performed by: INTERNAL MEDICINE

## 2022-06-01 PROCEDURE — 82962 GLUCOSE BLOOD TEST: CPT

## 2022-06-01 PROCEDURE — A9270 NON-COVERED ITEM OR SERVICE: HCPCS | Performed by: INTERNAL MEDICINE

## 2022-06-01 PROCEDURE — 700102 HCHG RX REV CODE 250 W/ 637 OVERRIDE(OP): Performed by: STUDENT IN AN ORGANIZED HEALTH CARE EDUCATION/TRAINING PROGRAM

## 2022-06-01 PROCEDURE — 99291 CRITICAL CARE FIRST HOUR: CPT | Mod: GC | Performed by: INTERNAL MEDICINE

## 2022-06-01 PROCEDURE — 99232 SBSQ HOSP IP/OBS MODERATE 35: CPT | Performed by: INTERNAL MEDICINE

## 2022-06-01 PROCEDURE — 700105 HCHG RX REV CODE 258: Performed by: INTERNAL MEDICINE

## 2022-06-01 PROCEDURE — 700111 HCHG RX REV CODE 636 W/ 250 OVERRIDE (IP): Performed by: STUDENT IN AN ORGANIZED HEALTH CARE EDUCATION/TRAINING PROGRAM

## 2022-06-01 PROCEDURE — 80048 BASIC METABOLIC PNL TOTAL CA: CPT

## 2022-06-01 PROCEDURE — 770022 HCHG ROOM/CARE - ICU (200)

## 2022-06-01 PROCEDURE — 85025 COMPLETE CBC W/AUTO DIFF WBC: CPT

## 2022-06-01 PROCEDURE — 85007 BL SMEAR W/DIFF WBC COUNT: CPT

## 2022-06-01 RX ORDER — PREDNISONE 5 MG/1
5 TABLET ORAL DAILY
Status: DISCONTINUED | OUTPATIENT
Start: 2022-06-04 | End: 2022-06-04

## 2022-06-01 RX ORDER — DEXTROSE MONOHYDRATE 25 G/50ML
25 INJECTION, SOLUTION INTRAVENOUS
Status: DISCONTINUED | OUTPATIENT
Start: 2022-06-01 | End: 2022-06-06

## 2022-06-01 RX ORDER — FUROSEMIDE 10 MG/ML
80 INJECTION INTRAMUSCULAR; INTRAVENOUS
Status: DISCONTINUED | OUTPATIENT
Start: 2022-06-02 | End: 2022-06-01

## 2022-06-01 RX ORDER — MIDODRINE HYDROCHLORIDE 5 MG/1
5 TABLET ORAL
Status: DISCONTINUED | OUTPATIENT
Start: 2022-06-01 | End: 2022-06-01

## 2022-06-01 RX ORDER — MIDODRINE HYDROCHLORIDE 5 MG/1
5 TABLET ORAL
Status: DISCONTINUED | OUTPATIENT
Start: 2022-06-01 | End: 2022-06-04

## 2022-06-01 RX ORDER — FUROSEMIDE 10 MG/ML
80 INJECTION INTRAMUSCULAR; INTRAVENOUS
Status: DISCONTINUED | OUTPATIENT
Start: 2022-06-01 | End: 2022-06-02

## 2022-06-01 RX ORDER — MICONAZOLE NITRATE 20 MG/G
CREAM TOPICAL 2 TIMES DAILY
Status: DISPENSED | OUTPATIENT
Start: 2022-06-02 | End: 2022-06-09

## 2022-06-01 RX ADMIN — INSULIN HUMAN 10 UNITS: 100 INJECTION, SOLUTION PARENTERAL at 23:51

## 2022-06-01 RX ADMIN — FUROSEMIDE 80 MG: 10 INJECTION, SOLUTION INTRAMUSCULAR; INTRAVENOUS at 18:06

## 2022-06-01 RX ADMIN — INSULIN HUMAN 10 UNITS: 100 INJECTION, SOLUTION PARENTERAL at 17:58

## 2022-06-01 RX ADMIN — TACROLIMUS 1 MG: 5 CAPSULE ORAL at 17:57

## 2022-06-01 RX ADMIN — MIDODRINE HYDROCHLORIDE 5 MG: 5 TABLET ORAL at 17:56

## 2022-06-01 RX ADMIN — HYDROCORTISONE SODIUM SUCCINATE 25 MG: 100 INJECTION, POWDER, FOR SOLUTION INTRAMUSCULAR; INTRAVENOUS at 00:27

## 2022-06-01 RX ADMIN — MIDODRINE HYDROCHLORIDE 5 MG: 5 TABLET ORAL at 08:11

## 2022-06-01 RX ADMIN — DAKIN'S SOLUTION 0.125% (QUARTER STRENGTH) 473 ML: 0.12 SOLUTION at 18:02

## 2022-06-01 RX ADMIN — HYDROCORTISONE SODIUM SUCCINATE 25 MG: 100 INJECTION, POWDER, FOR SOLUTION INTRAMUSCULAR; INTRAVENOUS at 17:54

## 2022-06-01 RX ADMIN — TACROLIMUS 1 MG: 5 CAPSULE ORAL at 05:47

## 2022-06-01 RX ADMIN — MINERAL OIL, PETROLATUM 1 APPLICATION: 425; 573 OINTMENT OPHTHALMIC at 17:57

## 2022-06-01 RX ADMIN — DAKIN'S SOLUTION 0.125% (QUARTER STRENGTH) 473 ML: 0.12 SOLUTION at 05:47

## 2022-06-01 RX ADMIN — HYDROCORTISONE SODIUM SUCCINATE 25 MG: 100 INJECTION, POWDER, FOR SOLUTION INTRAMUSCULAR; INTRAVENOUS at 12:07

## 2022-06-01 RX ADMIN — FAMOTIDINE 20 MG: 20 TABLET, FILM COATED ORAL at 05:47

## 2022-06-01 RX ADMIN — INSULIN GLARGINE-YFGN 20 UNITS: 100 INJECTION, SOLUTION SUBCUTANEOUS at 17:58

## 2022-06-01 RX ADMIN — INSULIN HUMAN 7 UNITS: 100 INJECTION, SOLUTION PARENTERAL at 12:13

## 2022-06-01 RX ADMIN — INSULIN GLARGINE-YFGN 20 UNITS: 100 INJECTION, SOLUTION SUBCUTANEOUS at 09:28

## 2022-06-01 RX ADMIN — HYDROCORTISONE SODIUM SUCCINATE 25 MG: 100 INJECTION, POWDER, FOR SOLUTION INTRAMUSCULAR; INTRAVENOUS at 05:47

## 2022-06-01 RX ADMIN — FUROSEMIDE 80 MG: 10 INJECTION, SOLUTION INTRAMUSCULAR; INTRAVENOUS at 05:46

## 2022-06-01 RX ADMIN — VASOPRESSIN 0.03 UNITS/MIN: 20 INJECTION, SOLUTION INTRAMUSCULAR; SUBCUTANEOUS at 00:28

## 2022-06-01 RX ADMIN — MEROPENEM 500 MG: 500 INJECTION, POWDER, FOR SOLUTION INTRAVENOUS at 17:57

## 2022-06-01 RX ADMIN — HYDROCORTISONE SODIUM SUCCINATE 25 MG: 100 INJECTION, POWDER, FOR SOLUTION INTRAMUSCULAR; INTRAVENOUS at 23:50

## 2022-06-01 RX ADMIN — MIDODRINE HYDROCHLORIDE 5 MG: 5 TABLET ORAL at 12:07

## 2022-06-01 RX ADMIN — MINERAL OIL, PETROLATUM 1 APPLICATION: 425; 573 OINTMENT OPHTHALMIC at 21:19

## 2022-06-01 ASSESSMENT — PAIN DESCRIPTION - PAIN TYPE
TYPE: ACUTE PAIN

## 2022-06-01 ASSESSMENT — ENCOUNTER SYMPTOMS
COUGH: 0
FEVER: 0

## 2022-06-01 ASSESSMENT — FIBROSIS 4 INDEX: FIB4 SCORE: 2.82

## 2022-06-01 NOTE — HEART FAILURE PROGRAM
Patient remains in S114 and today's critical care progress note continues to advise critically ill with guarded prognosis.    Will leave the following appointments as they are for now.    Regan 10, 2022  2:20 PM   (Arrive by 2:05 PM)   [facility] [facility] [place] [address]   [place] [address]   [phone]      Jun 22, 2022  8:45 AM   Heart Failure New [facility] (--) [address]   [place] [address]   [phone]

## 2022-06-01 NOTE — PROGRESS NOTES
"[facility] -  PROGRESS NOTE               Author: Live Hirsch M.D. Date & Time: 6/1/2022  11:54 AM     HPI:  Patient is a 66 year old male with a PMHx of renal transplant 9/10/2010 for polycystic kidney disease, DM, thrombocytopenia, CKD3b, pafib, covid19 infection, who presents for AMS.  He was brought in activated trauma for fall, CT imaging has been negative, but was in severe septic shock started on pressors and give nfluid boluses.  On broad spectrum abx.  Currently complains of right leg pain but thinks its better.  Confirms his last dose of tacrolimus and cellcept was yesterday.  Currently on levophed    DAILY NEPHROLOGY SUMMARY:  5/24: Consult done  5/25: NAEO, no complaints, feels a bit better, family at bedside  5/26: Decompensated overnight, intubated due to resp. Distress and pressors initiated  5/27: NAEO, no complaints, hemodynamics decompensated and CRRT initiated instead, tolerated well overnight, still on it this AM  5/28: transitioned to daily iHD for now, +13.8L for hospitalization, remains intubated, on pressor support, Tmax 101.1F, WBC 28.9  5/29: tolerated HD, UF 3L 5/28, remains intubated and on pressor support  5/30: Tolerated UF, improved hemodynamics, remains on pressors, remains on mechanical ventilation   5/31: Seen on Dialysis, vasc cath sluggish despite TPA, only able to run   6/1: Tolerated HD, extubated, UOP increasing-2.7L yesterday, net neg 5L     REVIEW OF SYSTEMS:    Unable to obtain, intubated    PMH/PSH/SH/FH:   Reviewed and unchanged since admission note    CURRENT MEDICATIONS:   Reviewed from admission to present day    VS:  VS:  /58   Pulse 85   Temp 36.8 °C (98.2 °F) (Temporal)   Resp (!) 22   Ht 1.956 m (6' 5\")   Wt 120 kg (265 lb 6.9 oz)   SpO2 95%   BMI 31.48 kg/m²   GENERAL: Ill appearing  CV: +pedal edema  RESP:mechanical breath sounds  GI: Soft  MSK: No joint deformities   SKIN: ecchymoses  NEURO: No tremor  PSYCH: " Deferred    Fluids:  In: 2617.7 [I.V.:327.9; Other:390; Dialysis:500]  Out: 7725     LABS:  Recent Labs     05/30/22  0505 05/31/22  0411 06/01/22  0425   SODIUM 137 137 136   POTASSIUM 4.0 5.4 4.1   CHLORIDE 98 100 97   CO2 26 24 25   GLUCOSE 218* 239* 146*   BUN 58* 68* 81*   CREATININE 3.20* 3.29* 3.52*   CALCIUM 8.0* 8.0* 7.8*     IMAGING:   All imaging reviewed from admission to present day    IMPRESSION:  # SANKET  - Etiology likely 2/2 ATN  - has received contrast  - RRT dependent since 5/27, now with signs of recovery  # DDKT  - 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 Coli bacteremia/septic shock  - Source unclear, ?colitis on imaging  - Abx on board  - pressor support  # Hyperkalemia, improved  - No ECG changes  # Encephalopathy  - Etiology likely 2/2 hypoperfusion/sepsis  # Acute respiratory failure  - Intubated 5/26  # RLE ulcer  - Trauma  # type 2 DM  # thrombocytopenia  - worsening    PLAN:  - Holding HD today, if needs going forward will likely need vasc cath replacement   -furosemide 80mg IV BID  -restart tacrolimus 1mg BID, holding MMF for now  -stress dose steroids  - Daily labs and weights  - Monitor I/O  - Maintain MAP > 65  - Dose all meds per eGFR < 15    Thank you

## 2022-06-01 NOTE — HEART FAILURE PROGRAM
Patient is admitted primarily for septic shock. He has been found to have an EF of 35%. His last encounter with cardiology in our system was in 2016 at which time there was not a heart failure diagnosis.    He is diagnosed with new heart failure with reduced ejection fraction (HFrEF)    Patient has atrial fibrillation and diabetes. Pneumococcal vaccines in 2017 & 2021.    Nurses: Please document HF education in the education tab and populate the new and improved HF Care Plan. These tools will guide day to day care of the HF patient.    Providers: below are Guideline Directed Medical Therapy (GDMT) for HFrEF. If any cannot be prescribed by discharge, would you please note the clinical reason for each in your discharge summary? Thanks! Estefany  • Evidence Based Beta Blocker (bisoprolol, carvedilol, or toprol xl), for EF of 40% or less  • DC - I, for EF of 40% or less ARNI is preferred If not cost prohibitive for patient  • SGLT2 inhibitor with proven ASCVD, HF, or DKD benefit Jardiance/empagliflozin): If not cost prohibitive for patient  • Aldosterone antagonist, for EF of 35% or less, if applicable  • Anticoagulation for atrial arrhythmia, if applicable  • Glycemic control for DM + HF, if applicable  • Lipid lowering medication for DM + HF, if applicable  • Hydralazine Hydrochloride/Isosorbide Dinitrate. The combination of hydralazine and isosorbide- dinitrate is recommended to reduce morbidity and mortality for patients self-described  Americans with NYHA class III-IV HFrEF (EF 40% or less), receiving optimal therapy with ACE inhibitors and beta blockers, unless contraindicated (Class I, DORIAN: A).  • Pneumococcal vaccine, if not previously received  • Influenza vaccine for current season, if not previously received  • Smoking cessation counseling documented, if applicable  • Device screening, if applicable  • Referral to disease management program specializing in heart failure care- requested that schedulers  notify me if patient cannot be scheduled at the Heart Failure [facility]  • 7 calendar day f/u appointment scheduled prior to discharge, appearing on signed after visit summary.

## 2022-06-01 NOTE — CARE PLAN
The patient is Watcher - Medium risk of patient condition declining or worsening    Shift Goals  Clinical Goals: maintain MAP > 65, titrate down on Levo as tollerated  Patient Goals: JUDITH  Family Goals: updates    Progress made toward(s) clinical / shift goals:  MAP goals maintained, levo weaned, extubated successfully    Patient is not progressing towards the following goals:n/a      Problem: Knowledge Deficit - Standard  Goal: Patient and family/care givers will demonstrate understanding of plan of care, disease process/condition, diagnostic tests and medications  Outcome: Progressing     Problem: Pain - Standard  Goal: Alleviation of pain or a reduction in pain to the patient’s comfort goal  Outcome: Progressing     Problem: Skin Integrity  Goal: Skin integrity is maintained or improved  Outcome: Progressing     Problem: Fall Risk  Goal: Patient will remain free from falls  Outcome: Progressing     Problem: Safety - Medical Restraint  Goal: Remains free of injury from restraints (Restraint for Interference with Medical Device)  Outcome: Progressing

## 2022-06-01 NOTE — PROGRESS NOTES
Infectious Disease Progress Note    Author: Tania Briggs M.D. Date & Time of service: 6/1/2022  12:00 PM    Chief Complaint:  Follow-up for septic shock, E. coli bacteremia, right lower extremity cellulitis    Interval History:  5/26 afebrile, WBC 24.5, blood cultures now growing E. Coli, meningitis CSF PCR panel not detected.  Patient remains on pressors.  Patient was noted to be obtunded with respiratory distress early this morning and has not now been intubated.  Renal function worse today.  Daughter and girlfriend at bedside with questions  5/27 T-max 99.7 WBC 26.8, remains on multiple pressors with increasing requirements overnight, remains on the vent however oxygenation requirements improving, renal function improving on CRRT  5/28 T-max 101.1 WBC 28.9.  Patient developed PEA arrest and underwent CPR yesterday.  White count worsening.  Remains on pressors.  Chest x-ray worse this morning.  CT of the right lower extremity shows right knee joint effusion and circumferential edema involving the soft tissues of the right lower leg.  CT abdomen and pelvis some wall thickening in the descending colon related to underdistention or colitis.  Remains sedated.  Ortho PA bedside performing right knee joint aspiration-fluid is cloudy yellow  5/29 afebrile, WBC 20.6 joint aspiration fluid not consistent with infection, remains on the vent however FiO2 decreased to 30%.  Remains on pressors but decreasing requirements.  Sedated  5/30 AF WBC 20.3 getting dialysis. Remains intubated on pressors  5/31 AF WBC 19.2 more awake today feet warmer Culture results and plan of care reviewed with patient and family. Remains intubated on pressors  6/1 AF WBC 16 extubated-denies cough/dyspnea     Labs Reviewed, Medications Reviewed and Wound Reviewed.    Review of Systems:  Review of Systems   Constitutional: Negative for fever.   Respiratory: Negative for cough.    Cardiovascular: Positive for leg swelling.   Skin: Negative for  rash.       Hemodynamics:  Temp (24hrs), Av.7 °C (98 °F), Min:36.2 °C (97.2 °F), Max:37 °C (98.6 °F)  Temperature: 36.8 °C (98.2 °F)  Pulse  Av.7  Min: 43  Max: 159   Blood Pressure : 110/58, Arterial BP: 117/65       Physical Exam:  Physical Exam  Vitals and nursing note reviewed.   Constitutional:       General: He is not in acute distress.     Appearance: He is ill-appearing. He is not toxic-appearing or diaphoretic.   HENT:      Nose: No rhinorrhea.   Eyes:      General: No scleral icterus.     Extraocular Movements: Extraocular movements intact.      Pupils: Pupils are equal, round, and reactive to light.      Comments: Mild erythema   Neck:      Comments: Right IJ catheter    Left IJ HD catheter  Cardiovascular:      Rate and Rhythm: Tachycardia present.   Pulmonary:      Effort: Pulmonary effort is normal. No respiratory distress.   Abdominal:      General: There is no distension.      Palpations: Abdomen is soft.      Tenderness: There is no abdominal tenderness.   Musculoskeletal:      Cervical back: Neck supple. No rigidity.      Right lower leg: Edema present.      Comments: Right knee surgical scar    Open wound on the anterior aspect of the right lower extremity dressed     Right knee swelling   Skin:     General: Skin is warm.      Coloration: Skin is not jaundiced.      Findings: Bruising present.   Neurological:      General: No focal deficit present.      Mental Status: He is alert and oriented to person, place, and time.   Psychiatric:         Mood and Affect: Mood normal.         Behavior: Behavior normal.         Meds:    Current Facility-Administered Medications:   •  midodrine  •  insulin GLARGINE  •  insulin regular **AND** POC blood glucose manual result **AND** NOTIFY MD and PharmD **AND** Administer 20 grams of glucose (approximately 8 ounces of fruit juice) every 15 minutes PRN FSBG less than 70 mg/dL **AND** dextrose bolus  •  [START ON 2022] hydrocortisone sodium succinate  PF  •  [START ON 6/4/2022] predniSONE  •  furosemide  •  tacrolimus  •  hydrocortisone sodium succinate PF  •  vasopressin (PITRESSIN) infusion  •  oxyCODONE immediate-release  •  dextrose bolus  •  meropenem  •  artificial tears  •  heparin  •  acetaminophen  •  Pharmacy  •  Respiratory Therapy Consult  •  senna-docusate **AND** polyethylene glycol/lytes **AND** magnesium hydroxide **AND** bisacodyl  •  sodium chloride  •  dakins 0.125% (1/4 strength)  •  HYDROmorphone    Labs:  Recent Labs     05/30/22  0505 05/31/22 0411 06/01/22  0425   WBC 20.3* 19.2* 16.7*   RBC 4.70 4.36* 4.21*   HEMOGLOBIN 13.6* 12.5* 12.1*   HEMATOCRIT 41.2* 38.8* 37.8*   MCV 87.7 89.0 89.8   MCH 28.9 28.7 28.7   RDW 49.2 50.3* 50.3*   PLATELETCT 49* 46* 77*   MPV  --   --  13.9*   NEUTSPOLYS 87.00* 89.40* 88.50*   LYMPHOCYTES 2.60* 3.50* 4.40*   MONOCYTES 7.80 5.30 4.40   EOSINOPHILS 0.00 0.00 0.00   BASOPHILS 0.00 0.00 0.00   RBCMORPHOLO Present Present Present     Recent Labs     05/30/22  0505 05/31/22 0411 06/01/22  0425   SODIUM 137 137 136   POTASSIUM 4.0 5.4 4.1   CHLORIDE 98 100 97   CO2 26 24 25   GLUCOSE 218* 239* 146*   BUN 58* 68* 81*     Recent Labs     05/30/22  0043 05/30/22  0505 05/31/22 0411 06/01/22  0425   ALBUMIN 2.3*  --   --   --    TBILIRUBIN 0.5  --   --   --    ALKPHOSPHAT 156*  --   --   --    TOTPROTEIN 5.3*  --   --   --    ALTSGPT 29  --   --   --    ASTSGOT 18  --   --   --    CREATININE 2.93* 3.20* 3.29* 3.52*       Imaging:  CT-CSPINE WITHOUT PLUS RECONS    Result Date: 5/24/2022 5/24/2022 11:38 AM HISTORY/REASON FOR EXAM: Fall and neck pain TECHNIQUE/EXAM DESCRIPTION: CT cervical spine without contrast, with reconstructions. The study was performed on a helical multidetector CT scanner. Thin-section helical scanning was performed from the skull base through T1. Sagittal and coronal multiplanar reconstructions were generated from the axial images. Low dose optimization technique was utilized for this CT  exam including automated exposure control and adjustment of the mA and/or kV according to patient size. COMPARISON:  None. FINDINGS: Alignment in the cervical spine is normal. There is no fracture or dislocation. The craniovertebral junction appears intact. The prevertebral and paraspinous soft tissues are unremarkable. There is moderate disc space. C6-7 level with marginal osteophytosis and moderate posterior spurring. The superior mediastinum and lung apices in the field of view are unremarkable.     1.  Moderate osteoarthritic changes at the C6-7 level with disc space narrowing and marginal osteophytosis. Further there is moderate cervical spondylotic changes at this level. 2.  No evidence of cervical spine fracture and/or subluxation.    CT-ABDOMEN-PELVIS WITH    Result Date: 5/24/2022 5/24/2022 11:39 AM HISTORY/REASON FOR EXAM:  trauma red. TECHNIQUE/EXAM DESCRIPTION:   CT scan of the abdomen and pelvis with contrast. Contrast-enhanced helical scanning was obtained from the diaphragmatic domes through the pubic symphysis following the bolus administration of nonionic contrast without complication. 75 mL of Omnipaque 350 nonionic contrast was administered without complication. Low dose optimization technique was utilized for this CT exam including automated exposure control and adjustment of the mA and/or kV according to patient size. COMPARISON: Complete abdominal sonogram, 9/14/2021. FINDINGS: Lower Chest: Dependent atelectasis in the lung bases. No pleural effusion or pneumothorax. No pericardial effusion. Liver: Normal. Spleen: Unremarkable. Pancreas: Unremarkable. Gallbladder: No calcified stones. Biliary: Nondilated. Adrenal glands: Normal. Kidneys: Absent right kidney. Right lower quadrant transplant kidney without hydronephrosis. Enlarged left kidney with 2 numerous to count fluid attenuation lesions throughout the cortex. Nonobstructing stones in the lower pole of the left kidney. No hydronephrosis or  hydroureter. Bowel: Sigmoid colon diverticulosis, without diverticulitis. Normal appendix. Lymph nodes: No adenopathy. Vasculature: Calcified atherosclerotic plaques in the aorta and iliac arteries, without aneurysmal dilation. Peritoneum: Unremarkable without ascites. Musculoskeletal: No acute or destructive process. Multilevel lumbar spondylosis. Pelvis: The bladder is decompressed by an indwelling Epps catheter.. Small umbilical hernia containing fat.     1. No acute posttraumatic findings in the abdomen or pelvis. 2. Bibasilar subsegmental atelectasis. 3. Right pelvic transplant kidney without hydronephrosis. 4. Polycystic left kidney. 5. Diverticulosis without diverticulitis. Normal appendix.    CT-EXTREMITY, LOWER W/O RIGHT    Result Date: 5/24/2022 5/24/2022 4:17 PM HISTORY/REASON FOR EXAM:  Soft tissue infection suspected, lower leg, no prior imaging; necrotizing fasciitis to right lower ext, recieved large amounts of contrast already with renal transplant. TECHNIQUE/EXAM DESCRIPTION AND NUMBER OF VIEWS: CT scan of the RIGHT lower extremity without contrast and including reconstructions. Thin-section noncontrast helical images were obtained. Coronal and sagittal reconstructions were generated from the axial images. Up to date radiation dose reduction adjustments have been utilized to meet ALARA standards for radiation dose reduction. COMPARISON: None. FINDINGS: Right total knee replacement. There is decreased bony mineralization of the right lower extremity. There is no evidence of ulceration or soft tissue mass in the right lower extremity. There is no evidence of large abscess formation. There is diffuse atherosclerotic calcification of the right superficial femoral artery. There are curvilinear regions of soft tissue attenuation throughout the subcutaneous tissues of the right mid and distal lower leg.     1.  Status post right total knee replacement. 2.  Diffuse atherosclerotic calcification of the  arterial system of the right lower extremity suspicious for diabetes mellitus. 3.  Soft tissue attenuation in the subcutaneous tissues of the mid to distal lower leg and foot suspicious for cellulitis. 4.  No evidence of soft tissue ulceration in the right lower leg or foot. 5.  No evidence of acute osteomyelitis in the right lower leg or foot. 6.  Small subcutaneous abscesses cannot be excluded without the use of intravenous contrast.    CT-HEAD W/O    Result Date: 5/24/2022 5/24/2022 11:38 AM HISTORY/REASON FOR EXAM:  trauma red. TECHNIQUE/EXAM DESCRIPTION AND NUMBER OF VIEWS: CT of the head without contrast. The study was performed on a helical multidetector CT scanner. Contiguous axial sections were obtained from the skull base through the vertex. Up to date radiation dose reduction adjustments have been utilized to meet ALARA standards for radiation dose reduction. COMPARISON:  None available FINDINGS: The calvariae and skull base are unremarkable. There are no extraaxial fluid collections. There is a pattern of cerebral atrophy manifest as enlargement of sulcal markings and ventricular prominence. The ventricular system and basal cisterns are otherwise unremarkable. There are no areas of abnormal density in the brain substance. There are no hemorrhagic lesions. There are no mass effects or shift of midline structures. The brainstem and posterior fossa structures are unremarkable. Paranasal sinuses in the field of view are unremarkable. Mastoids in the field of view are unremarkable.     1.  Cerebral atrophy. 2.  Otherwise, Head CT without contrast within normal limits. No evidence of acute cerebral infarction, hemorrhage or mass lesion.     DX-CHEST-LIMITED (1 VIEW)    Result Date: 5/25/2022 5/25/2022 7:52 AM HISTORY/REASON FOR EXAM:  Central line placement TECHNIQUE/EXAM DESCRIPTION AND NUMBER OF VIEWS: Single portable view of the chest. COMPARISON: Chest radiography, 5/25/2022 at 0719 hours FINDINGS:  Lungs: Symmetric low lung volumes. Stable linear and patchy opacities are unchanged. Stable small left pleural effusion. The right costophrenic recess is sharp. No visible pneumothorax bilaterally. Mediastinum: Cardiac silhouette size remains enlarged. Other: The right internal jugular central venous access catheter placed in the interval terminates over the right atrium. The remaining visualized bones and soft tissues are stable radiographically.     1. Right internal jugular central venous access catheter placed in the interval terminates over the upper aspect of the right atrium. No postprocedure visible pneumothorax. 2. Stable patchy parenchymal opacities in the lungs, with a stable small left pleural effusion.    DX-CHEST-LIMITED (1 VIEW)    Result Date: 5/24/2022 5/24/2022 11:26 AM HISTORY/REASON FOR EXAM:  Chest Pain. TECHNIQUE/EXAM DESCRIPTION AND NUMBER OF VIEWS: Single AP view of the chest. COMPARISON:  Chest x-ray 11/13/2020 FINDINGS: Lungs:  There are curvilinear opacities in the lung fields bilaterally most pronounced in the perihilar regions. Pleura:  There is no pleural effusion or pneumothorax. Heart and mediastinum:  The heart silhouette is mildly enlarged Bones:  Normal     1.  Curvilinear perihilar opacities likely atelectasis and/or parenchymal scarring. 2.  Mild cardiomegaly.    DX-CHEST-PORTABLE (1 VIEW)    Result Date: 5/25/2022 5/25/2022 7:12 AM HISTORY/REASON FOR EXAM: Shortness of Breath TECHNIQUE/EXAM DESCRIPTION:  Single AP view of the chest. COMPARISON: Yesterday FINDINGS: Cardiomegaly is observed. The mediastinal contour appears within normal limits.  The central  pulmonary vasculature appears prominent and indistinct. Bilateral lung volumes are diminished.  Diffuse scattered hazy pulmonary parenchymal opacities are seen. Blunting of bilateral costophrenic angles is seen, compatible with trace bilateral pleural effusions. The bony structures appear age-appropriate.     1.  Pulmonary  edema and/or infiltrates are identified, which are stable since the prior exam. 2.  Trace bilateral pleural effusions 3.  Cardiomegaly    DX-CHEST-PORTABLE (1 VIEW)    Result Date: 5/24/2022 5/24/2022 1:00 PM HISTORY/REASON FOR EXAM:  Central line placement. TECHNIQUE/EXAM DESCRIPTION AND NUMBER OF VIEWS: Single portable view of the chest. COMPARISON: Chest radiograph, 5/24/2022 at 1147 hours FINDINGS: Lungs: Stable curvilinear opacities in the lung fields, greatest in the perihilar regions, stable when compared to prior study. Stable bibasal parenchymal volume loss. No large volume pleural effusion or visible pneumothorax. No pulmonary vascular congestion or interstitial edema. Mediastinum: The cardiac silhouette size remains enlarged. Other: The left internal jugular central venous access catheter placed in the interval terminates over a persistent left superior vena cava. The remaining visualized bones and soft tissues are stable radiographically.     Left internal jugular central venous access catheter terminates over a persistent left superior vena cava. No postprocedure visible pneumothorax. The remainder is stable.    DX-PELVIS-1 OR 2 VIEWS    Result Date: 5/24/2022 5/24/2022 11:27 AM HISTORY/REASON FOR EXAM:  Pelvic/Hip Pain Following Trauma. TECHNIQUE/EXAM DESCRIPTION AND NUMBER OF VIEWS:  1 view(s) of the pelvis. COMPARISON:  None. FINDINGS: There is normal bony mineralization of the pelvis. There is no evidence of pelvic fracture or osseous lesion. The sacroiliac joints and pubic symphysis are symmetrical.     1.  Unremarkable single AP view of the pelvis.    DX-TIBIA AND FIBULA RIGHT    Result Date: 5/24/2022 5/24/2022 12:17 PM HISTORY/REASON FOR EXAM:  Pain/Deformity Following Trauma. TECHNIQUE/EXAM DESCRIPTION AND NUMBER OF VIEWS:  2 views of the RIGHT tibia and fibula. COMPARISON: Right knee radiography, 12/18/2006 FINDINGS: Bones: Postsurgical changes in the distal right femur and proximal right  tibia. Normal bone mineralization. No focal bone lesion. No acute fracture. Joint: Postsurgical changes of right total knee arthroplasty. Ankle mortise is preserved. No subluxation. Soft tissue: Arteriosclerosis. Circumferential right lower leg soft tissue swelling.     1. Right lower leg soft tissue swelling. 2. No acute fracture or subluxation. 3. Postsurgical changes of right total knee arthroplasty.    CT-CTA CHEST PULMONARY ARTERY W/ RECONS    Result Date: 5/24/2022 5/24/2022 11:38 AM HISTORY/REASON FOR EXAM:  Fall and chest pain TECHNIQUE/EXAM DESCRIPTION: CT angiogram scan for pulmonary embolism with contrast, with reconstructions. 1.25 mm helical sections were obtained from the lung apices through the lung bases following the rapid bolus administration of 75 mL of Omnipaque 350 nonionic contrast. Thin-section overlapping reconstruction interval was utilized. Coronal reconstructions were generated from the axial data. MIP post processing was performed and utilized for the interpretation. Low dose optimization technique was utilized for this CT exam including automated exposure control and adjustment of the mA and/or kV according to patient size. COMPARISON: None FINDINGS: Pulmonary Embolism: No. Main Pulmonary Arteries: No. Segmental branches: No. Subsegmental branches: No. Additional Comments: None. Lungs: There are curvilinear opacities dependently in the lung bases. Pleura: No pleural effusion. Nodes: No enlarged lymph nodes. Additional findings: Diffuse coronary artery calcification.     1.  No CT evidence of pulmonary emboli. 2.  Bibasilar atelectasis. 3.  No evidence of rib fracture. 4. Diffuse coronary artery calcification.    EC-ECHOCARDIOGRAM COMPLETE W/ CONT    Result Date: 5/25/2022  Transthoracic Echo Report Echocardiography Laboratory CONCLUSIONS No prior study is available for comparison. The left ventricular ejection fraction is visually estimated to be 35%. Unable to estimate pulmonary artery  pressure due to an inadequate tricuspid regurgitant jet. CIARA FORRESTER Exam Date:         2022                    11:35 Exam Location:     Inpatient Priority:          Routine Ordering Physician:        ALEJANDRA FROST Referring Physician: Sonographer:               Jack Lockwood RDCS, RVT Age:    66     Gender:    M MRN:    [id number] :    1955 BSA:    2.4    Ht (in):    77     Wt (lb):    235 Exam Type:     Complete Indications:     Shortness of breath ICD Codes:       786.05 CPT Codes:       58956 BP:   90     /   51     HR:   97 Technical Quality:       Technically difficult study -                          adequate information is obtained MEASUREMENTS  (Male / Female) Normal Values 2D ECHO LV Diastolic Diameter PLAX        4.6 cm                4.2 - 5.9 / 3.9 - 5.3 cm LV Systolic Diameter PLAX         4.1 cm                2.1 - 4.0 cm IVS Diastolic Thickness           1.2 cm                LVPW Diastolic Thickness          1.2 cm                LVOT Diameter                     2 cm                  Estimated LV Ejection Fraction    35 %                  LV Ejection Fraction MOD BP       38.5 %                >= 55  % LV Ejection Fraction MOD 4C       52.3 %                LV Ejection Fraction MOD 2C       40.7 %                IVC Diameter                      1.8 cm                DOPPLER AV Peak Velocity                  1.5 m/s               AV Peak Gradient                  9.5 mmHg              AV Mean Gradient                  5.7 mmHg              LVOT Peak Velocity                0.8 m/s               AV Area Cont Eq vti               1.6 cm2               MV Velocity Time Integral         13.5 cm               Mitral E Point Velocity           0.63 m/s              Mitral E to A Ratio               1.1                   MV Pressure Half Time             62.8 ms               MV Area PHT                       3.5 cm2               MV Deceleration Time               217 ms                TR Peak Velocity                  275 cm/s              PV Peak Velocity                  1.1 m/s               PV Peak Gradient                  4.6 mmHg              RVOT Peak Velocity                0.79 m/s              * Indicates values subject to auto-interpretation LV EF:  35    % FINDINGS Left Ventricle 3 ml of  contrast was used to evaluate for thrombus in the left ventricular apex. Normal left ventricular chamber size. Mild concentric left ventricular hypertrophy. Moderately reduced left ventricular systolic function. The left ventricular ejection fraction is visually estimated to be 35%.There is  regional wall motion abnormalities may be from old MI.indeterminate diastolic function. Right Ventricle Normal right ventricular size. Reduced right ventricular systolic function. Right Atrium Enlarged right atrium. Normal inferior vena cava size and inspiratory collapse. Left Atrium Normal left atrial size. Left atrial volume index is 21  mL/sq m. Mitral Valve The mitral valve is not well visualized. No mitral stenosis. Trace mitral regurgitation. Aortic Valve The aortic valve is not well visualized. No aortic valve stenosis. No aortic insufficiency. Tricuspid Valve The tricuspid valve is not well visualized. No tricuspid stenosis. Trace tricuspid regurgitation. Unable to estimate pulmonary artery pressure due to an inadequate tricuspid regurgitant jet. Pulmonic Valve The pulmonic valve is not well visualized. No pulmonic stenosis. Trace pulmonic insufficiency. Pericardium Normal pericardium without effusion. Aorta The ascending aorta diameter is 3.2  cm. Matthew Yoder MD (Electronically Signed) Final Date:     25 May 2022 14:37    [facility] (FOR ED USE ONLY)    Result Date: 5/24/2022 5/24/2022 12:01 PM HISTORY/REASON FOR EXAM:  Ground-level fall with traumatic injury TECHNIQUE/EXAM DESCRIPTION AND NUMBER OF VIEWS:  Limited ultrasound of the abdomen. FAST scan.  "Focused ultrasound of the 4 quadrants of the abdomen. COMPARISON: None FINDINGS: Focused ultrasound of the 4 quadrants of the abdomen demonstrates no evidence of free fluid in the 4 quadrants.     No free fluid seen in all 4 quadrants. Negative FAST scan.       Micro:  Results     Procedure Component Value Units Date/Time    FLUID CULTURE W/GRAM STAIN [[id number]] Collected: 05/28/22 1000    Order Status: Completed Specimen: Synovial Fluid Updated: 05/31/22 0736     Significant Indicator NEG     Source SYNO     Site SYNOVIAL FLUID     Culture Result No growth at 72 hours.     Gram Stain Result Few WBCs.  No organisms seen.      Narrative:      Collected By: [id number] JIM BEAN  Right knee  Collected By: [id number] JIM BEAN    BLOOD CULTURE [[id number]] Collected: 05/28/22 0800    Order Status: Completed Specimen: Blood from Peripheral Updated: 05/29/22 0707     Significant Indicator NEG     Source BLD     Site PERIPHERAL     Culture Result No Growth  Note: Blood cultures are incubated for 5 days and  are monitored continuously.Positive blood cultures  are called to the RN and reported as soon as  they are identified.      Narrative:      Per Hospital Policy: Only change Specimen Src: to \"Line\" if  specified by physician order.  Right Forearm/Arm    BLOOD CULTURE [[id number]] Collected: 05/28/22 0850    Order Status: Completed Specimen: Blood from Peripheral Updated: 05/29/22 0707     Significant Indicator NEG     Source BLD     Site PERIPHERAL     Culture Result No Growth  Note: Blood cultures are incubated for 5 days and  are monitored continuously.Positive blood cultures  are called to the RN and reported as soon as  they are identified.      Narrative:      Per Hospital Policy: Only change Specimen Src: to \"Line\" if  specified by physician order.  Right Forearm/Arm    GRAM STAIN [[id number]] Collected: 05/28/22 1000    Order Status: Completed Specimen: Synovial Updated: 05/28/22 1210     Significant " Indicator .     Source SYNO     Site SYNOVIAL FLUID     Gram Stain Result Few WBCs.  No organisms seen.      Narrative:      Collected By: [id number] JIM BEAN  Right knee  Collected By: [id number] JIM GARCIA GENEVA    QUANT BRONCHIAL WASHING [[id number]] Collected: 05/26/22 0944    Order Status: Completed Specimen: Respirate Updated: 05/28/22 0958     Significant Indicator NEG     Source RESP     Site BRONCHOALVEOLAR LAVAGE     Culture Result 4,200 cfu/mL usual upper respiratory shaun  including yeast.  No clinically significant Staphylococcus aureus, Methicillin  Resistant Staphylococcus aureus, or Pseudomonas species  isolated.       Gram Stain Result Moderate WBCs.  Few Gram positive cocci.  <5% intracellular organisms.      Narrative:      Collected By: [id number] SANTOSH BUENO.  Collected By: [id number] SANTOSH BUENO.    CSF CULTURE [[id number]] Collected: 05/24/22 1325    Order Status: Completed Specimen: CSF from Tap Updated: 05/27/22 0946     Significant Indicator NEG     Source CSF     Site TAP     Culture Result No growth at 72 hours.     Gram Stain Result Rare WBCs.  No organisms seen.      GRAM STAIN [[id number]] Collected: 05/26/22 0944    Order Status: Completed Specimen: Respirate Updated: 05/26/22 1847     Significant Indicator .     Source RESP     Site BRONCHOALVEOLAR LAVAGE     Gram Stain Result Moderate WBCs.  Few Gram positive cocci.  <5% intracellular organisms.      Narrative:      Collected By: [id number] SANTOSH BUENO.  Collected By: [id number] SANTOSH BUENO.    Culture Respiratory W/ Grm Stn - BAL [[id number]]     Order Status: Completed Specimen: Respirate from Bronchoalveolar Lavage     BLOOD CULTURE x2 [[id number]]  (Abnormal) Collected: 05/24/22 1122    Order Status: Completed Specimen: Blood from Peripheral Updated: 05/26/22 0952     Significant Indicator POS     Source BLD     Site PERIPHERAL     Culture Result Growth detected by Bactec instrument. 05/24/2022  23:30      Escherichia  "coli  See previous culture for sensitivity report.      Narrative:      CALL  [place]  19 tel. [phone],  CALLED  19 tel. [phone] 05/24/2022, 23:31, RB PERF. RESULTS CALLED TO: RN  [id number]  Per Hospital Policy: Only change Specimen Src: to \"Line\" if  specified by physician order.  Left Hand    BLOOD CULTURE x2 [[id number]]  (Abnormal)  (Susceptibility) Collected: 05/24/22 1124    Order Status: Completed Specimen: Blood from Peripheral Updated: 05/26/22 0949     Significant Indicator POS     Source BLD     Site PERIPHERAL     Culture Result Growth detected by Bactec instrument. 05/24/2022  23:30      Escherichia coli    Narrative:      CALL  [place]  19 tel. [phone],  CALLED  19 tel. [phone] 05/24/2022, 23:31, RB PERF. RESULTS CALLED TO:  [id number]  Per Hospital Policy: Only change Specimen Src: to \"Line\" if  specified by physician order.  Right Hand    Susceptibility     Escherichia coli (1)     Antibiotic Interpretation Microscan   Method Status    Ampicillin Resistant >16 mcg/mL CESIA Final    Ceftriaxone Sensitive <=1 mcg/mL CESIA Final    Cefazolin Sensitive <=2 mcg/mL CESIA Final    Ciprofloxacin Sensitive <=0.25 mcg/mL CESIA Final    Cefepime Sensitive <=2 mcg/mL CESIA Final    Cefuroxime Sensitive <=4 mcg/mL CESIA Final    Ampicillin/sulbactam Intermediate 16/8 mcg/mL CESIA Final    Ertapenem Sensitive <=0.5 mcg/mL CESIA Final    Tobramycin Sensitive <=2 mcg/mL CESIA Final    Gentamicin Sensitive <=2 mcg/mL CESIA Final    Minocycline Sensitive <=4 mcg/mL CESIA Final    Moxifloxacin Sensitive <=2 mcg/mL CESIA Final    Pip/Tazobactam Sensitive <=8 mcg/mL CESIA Final    Trimeth/Sulfa Resistant >2/38 mcg/mL CESIA Final    Tigecycline Sensitive <=2 mcg/mL CESIA Final                   RESP CULTURE [[id number]] Collected: 05/26/22 0944    Order Status: Canceled Specimen: Other from Bronchoalveolar Lavage           Assessment:  66 y.o. man with a history of uncontrolled type 2 diabetes mellitus, history of prior renal transplant on chronic " immunosuppression, stage III chronic kidney disease, atrial fibrillation on anticoagulation, and prior right total knee arthroplasty admitted on 5/24/2022 secondary to altered mentation in the setting of a ground-level fall and hit his right shin.  Patient found to be in septic shock with gram-negative bacteremia and right lower extremity cellulitis without any evidence of necrotizing fasciitis.  His right lower extremity cellulitis appears to be improving.    On the morning of 5/26 was noted to be obtunded with respiratory distress so was intubated.    Pertinent diagnoses:  Right knee joint effusion  Persistent fevers, resolved  Septic shock, remains on pressors  Ventilatory dependent respiratory failure, intubated on 5/26. Extubated 5/31  Pneumonia  E. coli bacteremia, source possibly below  Right lower extremity cellulitis  Acute kidney injury on stage III chronic kidney disease, now on CRRT  Rhabdomyolysis  Leukocytosis, decreased  Thrombocytopenia, improved  Acute encephalopathy  Uncontrolled type 2 diabetes mellitus, hemoglobin A1c 10.7  History of right total knee arthroplasty  History of renal transplant on tacrolimus, mycophenolate and prednisone  Recent fall    Plan:    -Follow blood cultures on 5/24-E coli, resistant to ampicillin, intermediate resistance to Unasyn  -Repeat blood cultures on 5/28- negative to date  -Avoid nephrotoxins  -CSF cultures-negative  - BAL culture on 5/26-upper respiratory shaun  -Nephrology following- now on dialysis  -Ultrasound of the right lower extremity with no stenosis or occlusion  -s/p bedside right knee joint aspiration on 5/28.  Synovial fluid analysis reveals hazy priscila fluid, 2638 WBCs with PMN predominance and no crystals seen.  Fluid not consistent with infection.  Culture neg    - Continue IV meropenem (started on 5/28 given new fever and worsening chest x-ray)  Anticipate 10 day course unless complication. Stop date 6/6/2022  -Pulm toilet    I have performed a  physical exam and reviewed and updated ROS and plan today 6/1/2022.  In review of yesterday's note 5/31/2022, there are no changes except as documented above.

## 2022-06-01 NOTE — DIETARY
Nutrition support weekly update:  Day 8 of admit.  Jama Altman is a 66 y.o. male with admitting DX of Septic shock.  Tube feeding initiated on 5/26. Current TF via small bowel cortrak is Novasource Renal goal rate of 45 ml/hour providing 2160 kcal, 98 gm protein, and 767 ml free water in 24 hours.    Assessment:  Weight 6/1: 120.4 kg via bed scale. Weight has increased since admit, likely due to fluid gain. Admit weight of 107 kg used to assess needs.  Re-estimate of nutritional needs not indicated at this time.     Evaluation:   1. Tube feed with Novasource Renal at goal rate of 45 ml/hour.  2. Labs 6/1 include K+ 4.1, Glu 146 (H), BUN 81 (H), Creat 3.52 (H). 5/31: K+ 5.4, Phos 1.8 (L)  3. Medications include Lasix, Lantus, SSI, Prednisone, Vasopressin at 0.03 units/min. Pt received Sodium Phosphate 5/31. Levophed discontinued.  4. Pt has been receiving daily HD, now on hold today.  5. Extubated 5/31. SLP evaluation ordered.  6. LBM 6/1, loose.  7. Skin: full thickness wound RLE, followed by wound team.  8. Current Renal tube feeding remains appropriate for pt with SANKET. Phosphorus has been low, requiring repletion, but K+ recently 5.4. Monitor renal labs and reassess tube feed as needed.    Malnutrition risk: No new criteria identified.    Recommendations/Plan:  1. Continue Novasource Renal goal rate of 45 ml/hour.  2. Fluids per MD.  3. Monitor weight.   4. Diet initiation and advancement per SLP.    RD following

## 2022-06-01 NOTE — PROGRESS NOTES
"Critical Care Progress Note    Date of admission  5/24/2022    Chief Complaint  66 y.o. male admitted 5/24/2022 with septic shock    Hospital Course  \"66y M Hx of renal transplant 9/10/2010 for end stage polycystic kidney dz maintain on prednisone, tacrolimus and mcyophenolate, DM, chronic thrombocytopenia, CKD 3B, Pafib, bronchitis, Covid 19 11/2020. That on Sunday hit his shin. He since has been complaining of pain. This morning he called his younger brother. His brother went to his house found him altered. He was brought in by EMS as a trauma activation since he had AMS and bruising from his fall. CT head negative, CTA chest negative, CT abdomen negative, CT spine negative other then degenerative findings. He was in severe septic shock, started on pressors and fluid bolus. He was given broad spectrum microbials. His only compliant is his right leg. LP is currently pending and was traumatic. He would like to be full code.\" - Dr. Swartz      Interval Problem Update  Patient is more alert and neurologically stronger  Requiring vasopressin  Added midodrine  Tapering hydrocortisone off while reintroducing home prednisone  Still on furosemide 80 mg IV twice daily per nephrology  Ordered PT OT speech  Thrombocytopenia improving once back to baseline may resume DVT prophylaxis  Discontinued insulin drip, started glargine and high sliding scale.    Review of Systems  Review of Systems   Unable to perform ROS: Intubated        Vital Signs for last 24 hours   Temp:  [36.2 °C (97.2 °F)-37 °C (98.6 °F)] 36.8 °C (98.2 °F)  Pulse:  [] 90  Resp:  [16-63] 30  BP: ()/(56-74) 124/65  SpO2:  [89 %-100 %] 97 %    Respiratory Information for the last 24 hours       Physical Exam   Physical Exam  Vitals and nursing note reviewed.   Constitutional:       General: He is awake. He is not in acute distress.     Appearance: Normal appearance. He is normal weight. He is not diaphoretic.   HENT:      Head: Normocephalic and " atraumatic.      Right Ear: External ear normal.      Left Ear: External ear normal.      Nose: Nose normal. No congestion or rhinorrhea.      Comments: Nasal core track in place     Mouth/Throat:      Mouth: Mucous membranes are moist.      Pharynx: No oropharyngeal exudate.   Eyes:      General: No scleral icterus.     Conjunctiva/sclera: Conjunctivae normal.      Pupils: Pupils are equal, round, and reactive to light.   Neck:      Comments: Bilateral IJ central venous catheters without hematoma  Cardiovascular:      Rate and Rhythm: Normal rate and regular rhythm. Occasional extrasystoles are present.     Chest Wall: PMI is displaced.      Pulses: Decreased pulses.           Radial pulses are 1+ on the right side and 1+ on the left side.      Heart sounds: No murmur heard.  Pulmonary:      Effort: Tachypnea present. No accessory muscle usage.      Breath sounds: Examination of the right-lower field reveals rhonchi and rales. Examination of the left-lower field reveals rhonchi and rales. Rhonchi and rales present. No wheezing.   Abdominal:      General: Bowel sounds are normal. There is no distension.      Palpations: Abdomen is soft.      Tenderness: There is no abdominal tenderness. There is no guarding.   Musculoskeletal:      Cervical back: Neck supple. No rigidity.      Right lower leg: Edema present.      Left lower leg: Edema present.      Comments: Bilateral lower extremity erythema and purplish discoloration with wounds, minimally tender to palpation   Skin:     General: Skin is warm and dry.      Capillary Refill: Capillary refill takes 2 to 3 seconds.      Coloration: Skin is not pale.   Neurological:      General: No focal deficit present.      Mental Status: He is alert.      Cranial Nerves: No cranial nerve deficit.      Comments: Follows commands, moves all extremities   Psychiatric:         Behavior: Behavior is cooperative.         Medications  Current Facility-Administered Medications    Medication Dose Route Frequency Provider Last Rate Last Admin   • midodrine (PROAMATINE) tablet 5 mg  5 mg Oral TID WITH MEALS Jeremy M Gonda, M.D.   5 mg at 06/01/22 0811   • insulin GLARGINE (Lantus,Semglee) injection  20 Units Subcutaneous BID James Titus M.D.   20 Units at 06/01/22 0928   • insulin regular (HumuLIN R,NovoLIN R) injection  3-14 Units Subcutaneous Q6HRS James Titus M.D.        And   • dextrose 50% (D50W) injection 25 g  25 g Intravenous Q15 MIN PRN James Titus M.D.       • furosemide (LASIX) injection 80 mg  80 mg Intravenous BID DIURETIC Live Hirsch M.D.   80 mg at 06/01/22 0546   • tacrolimus (PROGRAF) 1 mg/mL oral suspension 1 mg  1 mg Enteral Tube Q12HRS Live Hirsch M.D.   1 mg at 06/01/22 0547   • hydrocortisone sodium succinate PF (Solu-CORTEF) 100 MG injection 25 mg  25 mg Intravenous Q6HRS James Titus M.D.   25 mg at 06/01/22 0547   • vasopressin (VASOSTRICT) 20 Units in  mL Infusion  0.03 Units/min Intravenous Continuous Jeremy M Gonda, M.D. 9 mL/hr at 06/01/22 0550 0.03 Units/min at 06/01/22 0550   • oxyCODONE immediate-release (ROXICODONE) tablet 5 mg  5 mg Enteral Tube Q4HRS PRN Jeremy M Gonda, M.D.   5 mg at 05/30/22 1159   • dextrose 50% (D50W) injection 12.5-25 g  12.5-25 g Intravenous PRN Reuben Swartz M.D.       • meropenem (Merrem) 500 mg in  mL IV-MBP  500 mg Intravenous DAILY Vivian Francois M.D.   Stopped at 05/31/22 1917   • artificial tears (EYE LUBRICANT) ophth ointment 1 Application  1 Application Both Eyes Q8HRS Reuben Swartz M.D.   1 Application at 05/30/22 0536   • heparin injection 2,800 Units  2,800 Units Intracatheter DIALYSIS PRN James Sheppard M.D.   2,800 Units at 05/31/22 1300   • acetaminophen (Tylenol) tablet 650 mg  650 mg Enteral Tube Q6HRS PRN Mae Alfaro A.P.R.N.   650 mg at 05/28/22 0515   • Pharmacy Consult: Enteral tube insertion - review meds/change route/product selection  1 Each Other PHARMACY TO DOSE Reuben  NADEGE Swartz M.D.       • Respiratory Therapy Consult   Nebulization Continuous RT Reuben Swartz M.D.       • senna-docusate (PERICOLACE or SENOKOT S) 8.6-50 MG per tablet 2 Tablet  2 Tablet Enteral Tube BID Reuben Swartz M.D.   2 Tablet at 05/31/22 0548    And   • polyethylene glycol/lytes (MIRALAX) PACKET 1 Packet  1 Packet Enteral Tube QDAY PRN Reuben Swartz M.D.        And   • magnesium hydroxide (MILK OF MAGNESIA) suspension 30 mL  30 mL Enteral Tube QDAY PRN Reuben Swartz M.D.        And   • bisacodyl (DULCOLAX) suppository 10 mg  10 mg Rectal QDAY PRN Reuben Swartz M.D.       • sodium chloride (OCEAN) 0.65 % nasal spray 2 Spray  2 Spray Nasal Q2HRS PRN Kayy Hopkins M.D.       • dakins 0.125% (1/4 strength) topical soln   Topical BID Reuben Swartz M.D.   473 mL at 06/01/22 0547   • HYDROmorphone (Dilaudid) injection 0.5 mg  0.5 mg Intravenous Q2HRS PRN Tracey Whitfield M.D.   0.5 mg at 05/31/22 0059       Fluids    Intake/Output Summary (Last 24 hours) at 6/1/2022 1020  Last data filed at 6/1/2022 0600  Gross per 24 hour   Intake 2342.81 ml   Output 7725 ml   Net -5382.19 ml       Laboratory  Recent Labs     05/30/22  0200   ISTATAPH 7.548*   ISTATAPCO2 35.9   ISTATAPO2 55*   ISTATATCO2 32   [id number] 92*   ISTATARTHCO3 31.2*   ISTATARTBE 8*   ISTATTEMP 97.7 F   ISTATFIO2 30   ISTATSPEC Arterial   ISTATAPHTC 7.556*   [id number] 54*         Recent Labs     05/29/22  2056 05/30/22  0043 05/30/22  0505 05/30/22  0940 05/30/22  1920 05/31/22  0002 05/31/22  0411 06/01/22  0425   SODIUM 136 136 137  --   --   --  137 136   POTASSIUM 3.6 4.2 4.0  --   --   --  5.4 4.1   CHLORIDE 98 97 98  --   --   --  100 97   CO2 25 26 26  --   --   --  24 25   BUN 45* 52* 58*  --   --   --  68* 81*   CREATININE 2.72* 2.93* 3.20*  --   --   --  3.29* 3.52*   MAGNESIUM 1.6 1.7  --   --   --   --   --   --    PHOSPHORUS 2.1* 1.8*  --  1.8* 2.1* 1.8*  --   --    CALCIUM 7.8* 7.8* 8.0*  --   --   --  8.0*  7.8*     Recent Labs     05/30/22  0043 05/30/22  0505 05/30/22  0940 05/31/22 0411 06/01/22 0425   ALTSGPT 29  --   --   --   --    ASTSGOT 18  --   --   --   --    ALKPHOSPHAT 156*  --   --   --   --    TBILIRUBIN 0.5  --   --   --   --    PREALBUMIN  --   --  9.2*  --   --    GLUCOSE 203* 218*  --  239* 146*     Recent Labs     05/30/22 0043 05/30/22 0505 05/31/22 0411 06/01/22 0425   WBC  --  20.3* 19.2* 16.7*   NEUTSPOLYS  --  87.00* 89.40* 88.50*   LYMPHOCYTES  --  2.60* 3.50* 4.40*   MONOCYTES  --  7.80 5.30 4.40   EOSINOPHILS  --  0.00 0.00 0.00   BASOPHILS  --  0.00 0.00 0.00   ASTSGOT 18  --   --   --    ALTSGPT 29  --   --   --    ALKPHOSPHAT 156*  --   --   --    TBILIRUBIN 0.5  --   --   --      Recent Labs     05/30/22 0505 05/31/22 0411 06/01/22 0425   RBC 4.70 4.36* 4.21*   HEMOGLOBIN 13.6* 12.5* 12.1*   HEMATOCRIT 41.2* 38.8* 37.8*   PLATELETCT 49* 46* 77*       Imaging  X-Ray:  I have personally reviewed the images and compared with prior images. and My impression is: Unchanged bilateral lower lobe infiltrate/atelectasis with edema pattern, endotracheal tube/gastric tube/left hemodialysis catheter in good position.  Right IJ hemodialysis catheter 6 cm to deep  CT:    Reviewed  Echo:   Reviewed  Ultrasound:  Reviewed    Assessment/Plan  * Septic shock (HCC)- (present on admission)  Assessment & Plan  Concerns for Toxic shock syndrome with his erythroderma  Empiric rx clinda + zosyn + linezolid transitioned to Zosyn guided by ID with his E Coli bacteremia. Monitor need for surgical debridement appreciate consultation  S/p sepsis protocol and aggressive fluid resuscitation  Continue to titrate vasopressor support to maintain MAP >65 (added vasopressin)  Tapering hydrocortisone off while reintroducing home prednisone  Added midodrine  ID consulted  Cultures remain negative  Wound care  Ordered PT OT speech after extubation    Knee effusion, right  Assessment & Plan  S/p arthrocentesis  5/28  Follow-up on final fluid culture    Cardiac arrest (HCC)  Assessment & Plan  PEA arrest 5/27  Continue supportive care  Monitor neurologic function    Acute metabolic encephalopathy  Assessment & Plan  Secondary to sepsis -improving  Close neurologic monitoring  Limit sedatives    Immunosuppression due to chronic steroid use ([facility] [place])  Assessment & Plan  Slowly restarting home immunosuppression per guidance from nephrology  Working on tapering off hydrocortisone while reintroducing home prednisone    Acute on chronic systolic heart failure (HCC)- (present on admission)  Assessment & Plan  With most recent ejection fraction 35%  Fluid management  Unable to tolerate beta-blocker or ACE inhibitor at this time given shock and renal failure    Bacteremia due to Escherichia coli- (present on admission)  Assessment & Plan  Continue IV Zosyn for now   ID consulted. 10 day course of meropenem per ID (5/28~)  repeat blood cultures remain negative    Acute respiratory failure with hypoxia ([facility] [place])  Assessment & Plan  Intubated 5/26.  Extubated May 31   RT/O2 protocol  Limit sedatives, add oxycodone as needed pain  Dialysis for fluid removal    PAF (paroxysmal atrial fibrillation) ([facility] [place])- (present on admission)  Assessment & Plan  hx of continue to optimize filling pressure and electrolytes, hr is rate controlled,   Unable to tolerate anticoagulation due to thrombocytopenia  S/p trial of IV digoxin 5/28, hold additional for now  Optimize electrolytes, continuous telemetry monitoring    Type 2 diabetes mellitus with hyperlipidemia ([facility] [place])- (present on admission)  Assessment & Plan  With uncontrolled hyperglycemia.  On oral medications at home  Status post insulin drip, added glargine and high sliding scale.  Diabetic enteral nutrition monitoring glucose closely  Continue statin    Thrombocytopenia (HCC)- (present on admission)  Assessment & Plan  Acute on chronic secondary to sepsis  Holding systemic anticoagulation for DVT  prophylaxis  Thrombocytopenia improving once back to baseline may resume DVT prophylaxis  Monitor for bleeding, daily CBC    Acute renal failure superimposed on stage 3a chronic kidney disease (HCC)  Assessment & Plan  Hx of CKD s/p renal transplant, acute renal injury related to septic shock and ischemic atn s/p contrast die load  Avoid nephrotoxins  Continue Lasix  Nephrology consulted  Daily dialysis ongoing

## 2022-06-01 NOTE — PROGRESS NOTES
Pt noted to have unequal pupils R > L this morning as well as LUE weakness compared to other extremities. Dr Gonda notified, and at pt bedside performing NIH. MD not concerned at this time, will continue to monitor.

## 2022-06-02 ENCOUNTER — APPOINTMENT (OUTPATIENT)
Dept: RADIOLOGY | Facility: MEDICAL CENTER | Age: 67
DRG: 870 | End: 2022-06-02
Attending: INTERNAL MEDICINE
Payer: MEDICARE

## 2022-06-02 LAB
ANION GAP SERPL CALC-SCNC: 18 MMOL/L (ref 7–16)
BACTERIA BLD CULT: NORMAL
BACTERIA BLD CULT: NORMAL
BASOPHILS # BLD AUTO: 0 % (ref 0–1.8)
BASOPHILS # BLD: 0 K/UL (ref 0–0.12)
BUN SERPL-MCNC: 112 MG/DL (ref 8–22)
BURR CELLS BLD QL SMEAR: NORMAL
CALCIUM SERPL-MCNC: 7.3 MG/DL (ref 8.5–10.5)
CHLORIDE SERPL-SCNC: 94 MMOL/L (ref 96–112)
CO2 SERPL-SCNC: 27 MMOL/L (ref 20–33)
CREAT SERPL-MCNC: 4.17 MG/DL (ref 0.5–1.4)
EOSINOPHIL # BLD AUTO: 0 K/UL (ref 0–0.51)
EOSINOPHIL NFR BLD: 0 % (ref 0–6.9)
ERYTHROCYTE [DISTWIDTH] IN BLOOD BY AUTOMATED COUNT: 49.3 FL (ref 35.9–50)
GFR SERPLBLD CREATININE-BSD FMLA CKD-EPI: 15 ML/MIN/1.73 M 2
GLUCOSE BLD STRIP.AUTO-MCNC: 288 MG/DL (ref 65–99)
GLUCOSE BLD STRIP.AUTO-MCNC: 301 MG/DL (ref 65–99)
GLUCOSE BLD STRIP.AUTO-MCNC: 311 MG/DL (ref 65–99)
GLUCOSE BLD STRIP.AUTO-MCNC: 322 MG/DL (ref 65–99)
GLUCOSE SERPL-MCNC: 336 MG/DL (ref 65–99)
HCT VFR BLD AUTO: 40.4 % (ref 42–52)
HGB BLD-MCNC: 12.9 G/DL (ref 14–18)
LYMPHOCYTES # BLD AUTO: 0.49 K/UL (ref 1–4.8)
LYMPHOCYTES NFR BLD: 3.5 % (ref 22–41)
MAGNESIUM SERPL-MCNC: 2.4 MG/DL (ref 1.5–2.5)
MANUAL DIFF BLD: NORMAL
MCH RBC QN AUTO: 28.4 PG (ref 27–33)
MCHC RBC AUTO-ENTMCNC: 31.9 G/DL (ref 33.7–35.3)
MCV RBC AUTO: 88.8 FL (ref 81.4–97.8)
MONOCYTES # BLD AUTO: 0.72 K/UL (ref 0–0.85)
MONOCYTES NFR BLD AUTO: 5.2 % (ref 0–13.4)
MORPHOLOGY BLD-IMP: NORMAL
MYELOCYTES NFR BLD MANUAL: 1.7 %
NEUTROPHILS # BLD AUTO: 12.45 K/UL (ref 1.82–7.42)
NEUTROPHILS NFR BLD: 89.6 % (ref 44–72)
NRBC # BLD AUTO: 0 K/UL
NRBC BLD-RTO: 0 /100 WBC
OVALOCYTES BLD QL SMEAR: NORMAL
PHOSPHATE SERPL-MCNC: 7.7 MG/DL (ref 2.5–4.5)
PLATELET # BLD AUTO: 109 K/UL (ref 164–446)
PLATELET BLD QL SMEAR: NORMAL
PMV BLD AUTO: 13.3 FL (ref 9–12.9)
POIKILOCYTOSIS BLD QL SMEAR: NORMAL
POLYCHROMASIA BLD QL SMEAR: NORMAL
POTASSIUM SERPL-SCNC: 3.7 MMOL/L (ref 3.6–5.5)
RBC # BLD AUTO: 4.55 M/UL (ref 4.7–6.1)
RBC BLD AUTO: PRESENT
SIGNIFICANT IND 70042: NORMAL
SIGNIFICANT IND 70042: NORMAL
SITE SITE: NORMAL
SITE SITE: NORMAL
SODIUM SERPL-SCNC: 139 MMOL/L (ref 135–145)
SOURCE SOURCE: NORMAL
SOURCE SOURCE: NORMAL
TACROLIMUS BLD-MCNC: <2 NG/ML
WBC # BLD AUTO: 13.9 K/UL (ref 4.8–10.8)

## 2022-06-02 PROCEDURE — 71045 X-RAY EXAM CHEST 1 VIEW: CPT

## 2022-06-02 PROCEDURE — 302307 BAG FECAL MANAGEMENT TEMPORARY COLLECTION WITH FILTER - SEAL SIGNAL FMS: Performed by: HOSPITALIST

## 2022-06-02 PROCEDURE — 83735 ASSAY OF MAGNESIUM: CPT

## 2022-06-02 PROCEDURE — 700111 HCHG RX REV CODE 636 W/ 250 OVERRIDE (IP)

## 2022-06-02 PROCEDURE — 700105 HCHG RX REV CODE 258: Performed by: INTERNAL MEDICINE

## 2022-06-02 PROCEDURE — 700111 HCHG RX REV CODE 636 W/ 250 OVERRIDE (IP): Performed by: INTERNAL MEDICINE

## 2022-06-02 PROCEDURE — 700102 HCHG RX REV CODE 250 W/ 637 OVERRIDE(OP): Performed by: INTERNAL MEDICINE

## 2022-06-02 PROCEDURE — 82962 GLUCOSE BLOOD TEST: CPT

## 2022-06-02 PROCEDURE — 85025 COMPLETE CBC W/AUTO DIFF WBC: CPT

## 2022-06-02 PROCEDURE — 80048 BASIC METABOLIC PNL TOTAL CA: CPT

## 2022-06-02 PROCEDURE — 700111 HCHG RX REV CODE 636 W/ 250 OVERRIDE (IP): Performed by: STUDENT IN AN ORGANIZED HEALTH CARE EDUCATION/TRAINING PROGRAM

## 2022-06-02 PROCEDURE — 700102 HCHG RX REV CODE 250 W/ 637 OVERRIDE(OP): Performed by: STUDENT IN AN ORGANIZED HEALTH CARE EDUCATION/TRAINING PROGRAM

## 2022-06-02 PROCEDURE — A9270 NON-COVERED ITEM OR SERVICE: HCPCS | Performed by: INTERNAL MEDICINE

## 2022-06-02 PROCEDURE — 84100 ASSAY OF PHOSPHORUS: CPT

## 2022-06-02 PROCEDURE — 99232 SBSQ HOSP IP/OBS MODERATE 35: CPT | Performed by: INTERNAL MEDICINE

## 2022-06-02 PROCEDURE — 770001 HCHG ROOM/CARE - MED/SURG/GYN PRIV*

## 2022-06-02 PROCEDURE — 99223 1ST HOSP IP/OBS HIGH 75: CPT | Performed by: HOSPITALIST

## 2022-06-02 PROCEDURE — 99233 SBSQ HOSP IP/OBS HIGH 50: CPT | Mod: GC | Performed by: INTERNAL MEDICINE

## 2022-06-02 PROCEDURE — 90935 HEMODIALYSIS ONE EVALUATION: CPT

## 2022-06-02 PROCEDURE — 85007 BL SMEAR W/DIFF WBC COUNT: CPT

## 2022-06-02 PROCEDURE — 92610 EVALUATE SWALLOWING FUNCTION: CPT

## 2022-06-02 RX ORDER — HEPARIN SODIUM 1000 [USP'U]/ML
INJECTION, SOLUTION INTRAVENOUS; SUBCUTANEOUS
Status: COMPLETED
Start: 2022-06-02 | End: 2022-06-02

## 2022-06-02 RX ORDER — HEPARIN SODIUM 5000 [USP'U]/ML
5000 INJECTION, SOLUTION INTRAVENOUS; SUBCUTANEOUS EVERY 12 HOURS
Status: DISCONTINUED | OUTPATIENT
Start: 2022-06-02 | End: 2022-06-21

## 2022-06-02 RX ADMIN — FUROSEMIDE 80 MG: 10 INJECTION, SOLUTION INTRAMUSCULAR; INTRAVENOUS at 07:21

## 2022-06-02 RX ADMIN — MEROPENEM 500 MG: 500 INJECTION, POWDER, FOR SOLUTION INTRAVENOUS at 18:29

## 2022-06-02 RX ADMIN — MINERAL OIL, PETROLATUM 1 APPLICATION: 425; 573 OINTMENT OPHTHALMIC at 05:23

## 2022-06-02 RX ADMIN — HEPARIN SODIUM 5000 UNITS: 5000 INJECTION, SOLUTION INTRAVENOUS; SUBCUTANEOUS at 18:45

## 2022-06-02 RX ADMIN — HEPARIN SODIUM 2800 UNITS: 1000 INJECTION, SOLUTION INTRAVENOUS; SUBCUTANEOUS at 14:00

## 2022-06-02 RX ADMIN — INSULIN HUMAN 10 UNITS: 100 INJECTION, SOLUTION PARENTERAL at 05:24

## 2022-06-02 RX ADMIN — HYDROCORTISONE SODIUM SUCCINATE 25 MG: 100 INJECTION, POWDER, FOR SOLUTION INTRAMUSCULAR; INTRAVENOUS at 18:30

## 2022-06-02 RX ADMIN — MICONAZOLE NITRATE: 20 CREAM TOPICAL at 05:22

## 2022-06-02 RX ADMIN — INSULIN HUMAN 10 UNITS: 100 INJECTION, SOLUTION PARENTERAL at 18:42

## 2022-06-02 RX ADMIN — INSULIN GLARGINE-YFGN 35 UNITS: 100 INJECTION, SOLUTION SUBCUTANEOUS at 18:42

## 2022-06-02 RX ADMIN — DAKIN'S SOLUTION 0.125% (QUARTER STRENGTH) 473 ML: 0.12 SOLUTION at 06:01

## 2022-06-02 RX ADMIN — TACROLIMUS 1 MG: 5 CAPSULE ORAL at 05:23

## 2022-06-02 RX ADMIN — INSULIN GLARGINE-YFGN 20 UNITS: 100 INJECTION, SOLUTION SUBCUTANEOUS at 05:23

## 2022-06-02 RX ADMIN — MIDODRINE HYDROCHLORIDE 5 MG: 5 TABLET ORAL at 18:29

## 2022-06-02 RX ADMIN — TACROLIMUS 1 MG: 5 CAPSULE ORAL at 18:47

## 2022-06-02 RX ADMIN — HYDROCORTISONE SODIUM SUCCINATE 25 MG: 100 INJECTION, POWDER, FOR SOLUTION INTRAMUSCULAR; INTRAVENOUS at 05:23

## 2022-06-02 RX ADMIN — HEPARIN SODIUM 5000 UNITS: 5000 INJECTION, SOLUTION INTRAVENOUS; SUBCUTANEOUS at 08:19

## 2022-06-02 RX ADMIN — MIDODRINE HYDROCHLORIDE 5 MG: 5 TABLET ORAL at 10:27

## 2022-06-02 RX ADMIN — MINERAL OIL, PETROLATUM 1 APPLICATION: 425; 573 OINTMENT OPHTHALMIC at 21:35

## 2022-06-02 RX ADMIN — MICONAZOLE NITRATE: 20 CREAM TOPICAL at 18:37

## 2022-06-02 RX ADMIN — MIDODRINE HYDROCHLORIDE 5 MG: 5 TABLET ORAL at 08:19

## 2022-06-02 ASSESSMENT — ENCOUNTER SYMPTOMS
HEADACHES: 0
SHORTNESS OF BREATH: 0
SENSORY CHANGE: 0
MYALGIAS: 1
STRIDOR: 0
NERVOUS/ANXIOUS: 0
BLOOD IN STOOL: 0
ROS SKIN COMMENTS: BRUISING
COUGH: 0
BACK PAIN: 0
DIARRHEA: 0
PALPITATIONS: 0
EYE DISCHARGE: 0
NAUSEA: 0
FEVER: 0
CHILLS: 0
SPEECH CHANGE: 0
ABDOMINAL PAIN: 0
DIZZINESS: 0

## 2022-06-02 ASSESSMENT — PAIN DESCRIPTION - PAIN TYPE
TYPE: ACUTE PAIN

## 2022-06-02 ASSESSMENT — FIBROSIS 4 INDEX: FIB4 SCORE: 1.99

## 2022-06-02 NOTE — CONSULTS
Hospital Medicine Consultation    Date of Service  6/2/2022    Referring Physician  Jeremy Gonda, M.D.    Consulting Physician  Jama Cat D.O.    Reason for Consultation  Transfer of care out of ICU    Chief Complaint:  Altered mentation    History of Presenting Illness  Jama Altman is a 66 y.o. male w/ HTN, DLD, diabetes,  polycystic kidney dz with a renal transplant 9/10/10 and on immunosuppression but has CKD, ADELFO who presented 5/24/2022 with altered mentation.  There was initial concern that he had fallen at home and struck his head.  In the ER he was in atrial fibrillation with a rapid rate of 120-160.  He was given a fluid bolus and initiated on norepinephrine.  He was admitted for severe septic shock likely related to a soft tissue infection of the right leg from where he bumped into something several days ago. During admit he was found to have E.coli bacteremia and right leg cellulitis.  Spinal fluid ws negative for meningitis.  On 5/26 the patient had respiratory distress and was intubated. On 5/28 the patient had PEA arrest and had CPR.  A right knee aspiration was performed and fluid analysis was not showing infection.  On 6/1 the patient was extubated.  The patient had CRRT initiated on 5/27 and daily HD on 5/28 per nephrology notes.    6/2: WBC:13.9, Hgb:12.9, Plt:109, BUN:112, Cr:4.17. Patient's significant other, Ashely, at bedside.  Patient awake and oriented with clear speech.  Denies pain.  Making some urine but despite this, nephrology to have dialysis today. Await ST/PT/OT.      Review of Systems  Review of Systems   Constitutional: Negative for chills and fever.   Eyes: Negative for discharge.   Respiratory: Negative for cough, shortness of breath and stridor.    Cardiovascular: Negative for chest pain, palpitations and leg swelling.   Gastrointestinal: Negative for abdominal pain, blood in stool, diarrhea and nausea.   Genitourinary: Negative for dysuria and hematuria.   Musculoskeletal:  Negative for back pain and joint pain.   Skin:        Bruising   Neurological: Negative for dizziness, sensory change, speech change and headaches.   Psychiatric/Behavioral: The patient is not nervous/anxious.        Past Medical History   has a past medical history of Benign essential hypertension, Hyperlipoproteinemia, Hypertension, Pain, Polycystic kidney (9/10/10), Sleep apnea, and Snoring.    Surgical History   has a past surgical history that includes knee arthroplasty total (1/12/07); knee arthroscopy (4/10/06); other orthopedic surgery (7/8/74); other (9/10/10); knee arthroscopy (5/3/2011); meniscectomy, knee, medial (5/3/2011); knee unicompartmental (12/23/2011); knee arthroscopy (12/23/2011); and knee manipulation (2/16/2012).    Family History  Mother alive in a care facility in [place].  Dad passed away from COPD complications    Social History   reports that he has never smoked. He has never used smokeless tobacco. He reports that he does not drink alcohol and does not use drugs. Owns a [employer] business.  Single, no kids.  Has a significant other.  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    Medications  Current Facility-Administered Medications   Medication Dose Route Frequency Provider Last Rate Last Admin   • heparin injection 5,000 Units  5,000 Units Subcutaneous Q12HRS James Titus M.D.   5,000 Units at 06/02/22 0819   • insulin GLARGINE (Lantus,Semglee) injection  35 Units Subcutaneous BID James Titus M.D.       • insulin regular (HumuLIN R,NovoLIN R) injection  3-14 Units Subcutaneous Q6HRS James Titus M.D.   10 Units at 06/02/22 0524    And   • dextrose 50% (D50W) injection 25 g  25 g Intravenous Q15 MIN PRN James Titus M.D.       • hydrocortisone sodium succinate PF (Solu-CORTEF) 100 MG injection 25 mg  25 mg Intravenous Q12HRS Jeremy M Gonda, M.D.       • [START ON 6/4/2022] predniSONE (DELTASONE) tablet 5 mg  5 mg Enteral Tube DAILY Jeremy M Gonda, M.D.       •  midodrine (PROAMATINE) tablet 5 mg  5 mg Enteral Tube TID WITH MEALS Jeremy M Gonda, M.D.   5 mg at 06/02/22 0819   • miconazole 2%-zinc oxide (Mabel) topical cream   Topical BID Jeremy M Gonda, M.D.   Given at 06/02/22 0522   • tacrolimus (PROGRAF) 1 mg/mL oral suspension 1 mg  1 mg Enteral Tube Q12HRS Live Hirsch M.D.   1 mg at 06/02/22 0523   • oxyCODONE immediate-release (ROXICODONE) tablet 5 mg  5 mg Enteral Tube Q4HRS PRN Jeremy M Gonda, M.D.   5 mg at 05/30/22 1159   • meropenem (Merrem) 500 mg in  mL IV-MBP  500 mg Intravenous DAILY Jeremy M Gonda, M.D.   Stopped at 06/01/22 1827   • artificial tears (EYE LUBRICANT) ophth ointment 1 Application  1 Application Both Eyes Q8HRS Reuben Swartz M.D.   1 Application at 06/02/22 0523   • heparin injection 2,800 Units  2,800 Units Intracatheter DIALYSIS PRN James Sheppard M.D.   2,800 Units at 05/31/22 1300   • acetaminophen (Tylenol) tablet 650 mg  650 mg Enteral Tube Q6HRS PRN JENNIFER VickersRRudiNRudi   650 mg at 05/28/22 0515   • Pharmacy Consult: Enteral tube insertion - review meds/change route/product selection  1 Each Other PHARMACY TO DOSE Reuben Swartz M.D.       • Respiratory Therapy Consult   Nebulization Continuous RT Reuben Swartz M.D.       • senna-docusate (PERICOLACE or SENOKOT S) 8.6-50 MG per tablet 2 Tablet  2 Tablet Enteral Tube BID Reuben Swartz M.D.   2 Tablet at 05/31/22 0548    And   • polyethylene glycol/lytes (MIRALAX) PACKET 1 Packet  1 Packet Enteral Tube QDAY PRN Reuben Swartz M.D.        And   • magnesium hydroxide (MILK OF MAGNESIA) suspension 30 mL  30 mL Enteral Tube QDAY PRN Reuben Swartz M.D.        And   • bisacodyl (DULCOLAX) suppository 10 mg  10 mg Rectal QDAY PRN Reuben Swartz M.D.       • sodium chloride (OCEAN) 0.65 % nasal spray 2 Spray  2 Spray Nasal Q2HRS PRN Kayy Hopkins M.D.       • dakins 0.125% (1/4 strength) topical soln   Topical BID Reuben Swartz M.D.   473 mL at  06/02/22 0601   • HYDROmorphone (Dilaudid) injection 0.5 mg  0.5 mg Intravenous Q2HRS PRN Tracey Whitfield M.D.   0.5 mg at 05/31/22 0059       Allergies  Allergies   Allergen Reactions   • Doxycycline Rash     Sweats and shakes: 9/28/17: Clarified allergy with patient. Allergy was in 1998 and he doesn't remember what happened. He thought the medication is for pain.  Tolerates doxycycline 9/2017       Physical Exam  Temp:  [36.1 °C (97 °F)-36.7 °C (98 °F)] 36.1 °C (97 °F)  Pulse:  [] 100  Resp:  [17-57] 20  BP: ()/(50-83) 114/67  SpO2:  [92 %-98 %] 96 %    Physical Exam  Vitals reviewed.   Constitutional:       Appearance: Normal appearance. He is not diaphoretic.   HENT:      Head: Normocephalic and atraumatic.      Nose: Nose normal.      Mouth/Throat:      Mouth: Mucous membranes are moist.      Pharynx: No oropharyngeal exudate.   Eyes:      General: No scleral icterus.        Right eye: No discharge.         Left eye: No discharge.      Extraocular Movements: Extraocular movements intact.      Conjunctiva/sclera: Conjunctivae normal.   Neck:      Comments: Left temp HD catheter present  Cardiovascular:      Rate and Rhythm: Normal rate and regular rhythm.      Pulses:           Radial pulses are 2+ on the right side and 2+ on the left side.        Dorsalis pedis pulses are 2+ on the right side and 2+ on the left side.      Heart sounds: No murmur heard.  Pulmonary:      Effort: Pulmonary effort is normal. No respiratory distress.      Breath sounds: Normal breath sounds. No wheezing or rales.   Abdominal:      General: Bowel sounds are normal. There is no distension.      Palpations: Abdomen is soft.      Tenderness: There is no abdominal tenderness.   Genitourinary:     Comments: Rectal tube present with dark liquid stool  Musculoskeletal:         General: No swelling or tenderness.      Cervical back: Neck supple. No muscular tenderness.      Right lower leg: No edema.      Left lower leg: No  edema.   Lymphadenopathy:      Cervical: No cervical adenopathy.   Skin:     Coloration: Skin is not jaundiced or pale.      Findings: Bruising (left knee, left supra orbital/frontal head.) present.   Neurological:      General: No focal deficit present.      Mental Status: He is alert and oriented to person, place, and time. Mental status is at baseline.      Cranial Nerves: No cranial nerve deficit.   Psychiatric:         Mood and Affect: Mood normal.         Behavior: Behavior normal.         Fluids      Laboratory  Recent Labs     05/31/22 0411 06/01/22 0425 06/02/22  0518   WBC 19.2* 16.7* 13.9*   RBC 4.36* 4.21* 4.55*   HEMOGLOBIN 12.5* 12.1* 12.9*   HEMATOCRIT 38.8* 37.8* 40.4*   MCV 89.0 89.8 88.8   MCH 28.7 28.7 28.4   MCHC 32.2* 32.0* 31.9*   RDW 50.3* 50.3* 49.3   PLATELETCT 46* 77* 109*   MPV  --  13.9* 13.3*     Recent Labs     05/31/22 0411 06/01/22 0425 06/02/22  0518   SODIUM 137 136 139   POTASSIUM 5.4 4.1 3.7   CHLORIDE 100 97 94*   CO2 24 25 27   GLUCOSE 239* 146* 336*   BUN 68* 81* 112*   CREATININE 3.29* 3.52* 4.17*   CALCIUM 8.0* 7.8* 7.3*                     Imaging  DX-CHEST-LIMITED (1 VIEW)   Final Result      1.  Bibasilar underinflation atelectasis which could obscure an additional process. This is unchanged.   2.  Interstitial opacities likely pulmonary edema   3.  Persistently enlarged cardiac silhouette      DX-CHEST-LIMITED (1 VIEW)   Final Result      1.  Hypoinflation with mildly increased left basilar atelectasis.   2.  Removal of endotracheal tube.      DX-CHEST-LIMITED (1 VIEW)   Final Result         No significant interval change.      DX-CHEST-LIMITED (1 VIEW)   Final Result      Stable areas of patchy atelectasis/consolidation.      DX-CHEST-PORTABLE (1 VIEW)   Final Result      Stable chest x-ray findings.      DX-CHEST-PORTABLE (1 VIEW)   Final Result         1.  Pulmonary edema and/or infiltrates, somewhat increased particularly in the right lung base compared to prior  study.   2.  Cardiomegaly      CT-ABDOMEN-PELVIS W/O   Final Result         Limited noncontrast exam      1. Long segment wall thickening from the descending colon to the sigmoid colon could relate to underdistention or colitis. Air-fluid level in the more proximal colon is likely diarrheal disease.      2. Right lower quadrant transplant kidney with retained prior contrast, likely secondary to renal failure/ATN/contrast nephropathy. No hydronephrosis.      Absent right kidney. Polycystic left kidney.      CT-EXTREMITY, LOWER W/O RIGHT   Final Result      1. Postsurgical changes of right total knee arthroplasty.   2. Right knee joint effusion with thickening of the joint capsule and surrounding soft tissue edema.   3. Circumferential edema involving the soft tissues of the right lower leg with an anterior soft tissue defect and with areas of clustering on the medial right lower leg skin surface.   4. Arteriosclerosis.      DX-CHEST-PORTABLE (1 VIEW)   Final Result      1.  Unchanged position of supporting devices are visualized extent   2.  No visible pneumothorax following chest compressions   3.  Unchanged atelectasis and possible superimposed interstitial pulmonary edema or atypical pneumonia      US-EXTREMITY ARTERY LOWER UNILAT RIGHT   Final Result      US-MIRELLA SINGLE LEVEL BILAT   Final Result      US-EXTREMITY ARTERY LOWER BILAT W/MIRELLA (COMBO)   Final Result      DX-CHEST-PORTABLE (1 VIEW)   Final Result         1.  Pulmonary edema and/or infiltrates, somewhat increased particularly in the right lung base compared to prior study.   2.  Cardiomegaly      DX-ABDOMEN FOR TUBE PLACEMENT   Final Result      1. The tip of the feeding tube terminates over the junction of the gastric pylorus and duodenal bulb.   2. The remainder is stable.      DX-CHEST-PORTABLE (1 VIEW)   Final Result      1. Interval decrease in size of the right pleural effusion.   2. No left pleural effusion.   3. No visible pneumothorax status  post bronchoscopy.   4. Interval placement of a Dobbhoff feeding tube.   5. Improving parenchymal loss in the right lung base, incompletely resolved.   6. The remainder is stable.      DX-CHEST-PORTABLE (1 VIEW)   Final Result      1. Interval development of a moderate right pleural effusion after placement of a left internal jugular dialysis catheter, abutting the right lateral wall of the right atrium. Verification of line position with contrast injection under fluoroscopy is    offered.   2. Improved perihilar atelectasis.   3. The remainder is stable.      I, Dr. Matthew Brown, discussed the results of this examination directly by phone with Dr. Reuben Swartz on 5/26/2022 at 0855 hours.      EC-ECHOCARDIOGRAM COMPLETE W/ CONT   Final Result      DX-CHEST-LIMITED (1 VIEW)   Final Result         1. Right internal jugular central venous access catheter placed in the interval terminates over the upper aspect of the right atrium. No postprocedure visible pneumothorax.   2. Stable patchy parenchymal opacities in the lungs, with a stable small left pleural effusion.      DX-CHEST-PORTABLE (1 VIEW)   Final Result         1.  Pulmonary edema and/or infiltrates are identified, which are stable since the prior exam.   2.  Trace bilateral pleural effusions   3.  Cardiomegaly      CT-EXTREMITY, LOWER W/O RIGHT   Final Result      1.  Status post right total knee replacement.      2.  Diffuse atherosclerotic calcification of the arterial system of the right lower extremity suspicious for diabetes mellitus.      3.  Soft tissue attenuation in the subcutaneous tissues of the mid to distal lower leg and foot suspicious for cellulitis.      4.  No evidence of soft tissue ulceration in the right lower leg or foot.      5.  No evidence of acute osteomyelitis in the right lower leg or foot.      6.  Small subcutaneous abscesses cannot be excluded without the use of intravenous contrast.      DX-CHEST-PORTABLE (1 VIEW)   Final  Result      Left internal jugular central venous access catheter terminates over a persistent left superior vena cava. No postprocedure visible pneumothorax.      The remainder is stable.      DX-TIBIA AND FIBULA RIGHT   Final Result      1. Right lower leg soft tissue swelling.   2. No acute fracture or subluxation.   3. Postsurgical changes of right total knee arthroplasty.      US-ABDOMEN F.A.S.T. LTD (FOR ED USE ONLY)   Final Result      No free fluid seen in all 4 quadrants.      Negative FAST scan.            CT-ABDOMEN-PELVIS WITH   Final Result      1. No acute posttraumatic findings in the abdomen or pelvis.   2. Bibasilar subsegmental atelectasis.   3. Right pelvic transplant kidney without hydronephrosis.   4. Polycystic left kidney.   5. Diverticulosis without diverticulitis. Normal appendix.      CT-CTA CHEST PULMONARY ARTERY W/ RECONS   Final Result      1.  No CT evidence of pulmonary emboli.      2.  Bibasilar atelectasis.      3.  No evidence of rib fracture.      4. Diffuse coronary artery calcification.      CT-HEAD W/O   Final Result      1.  Cerebral atrophy.      2.  Otherwise, Head CT without contrast within normal limits. No evidence of acute cerebral infarction, hemorrhage or mass lesion.         CT-CSPINE WITHOUT PLUS RECONS   Final Result      1.  Moderate osteoarthritic changes at the C6-7 level with disc space narrowing and marginal osteophytosis. Further there is moderate cervical spondylotic changes at this level.      2.  No evidence of cervical spine fracture and/or subluxation.      DX-PELVIS-1 OR 2 VIEWS   Final Result      1.  Unremarkable single AP view of the pelvis.      DX-CHEST-LIMITED (1 VIEW)   Final Result      1.  Curvilinear perihilar opacities likely atelectasis and/or parenchymal scarring.      2.  Mild cardiomegaly.          Assessment/Plan  * Septic shock (HCC)- (present on admission)  Assessment & Plan  E.coli bacteremia  Infectious disease  consulting  Antibiotics  C/o right leg cellulitis  6/2 WBC:13.9    Knee effusion, right  Assessment & Plan  S/p aspiration with unremarkable evaluation  PT/OT  Fell recently to his knees.  Bruising and blister on left knee.    Cardiac arrest ([facility])  Assessment & Plan  S/p PEA  Monitor labs, vitals.    Acute metabolic encephalopathy  Assessment & Plan  Improving.  Significant other at bedside 6/2 am states he seems more alert.  Monitor neurochecks  Treating infection as likely etiology    Immunosuppression due to chronic steroid use ([facility], located [facility])  Assessment & Plan  Restart immunosuppression as per nephrology  On antibiotics for infection.    Bacteremia due to Escherichia coli- (present on admission)  Assessment & Plan  Antibiotics.  Question bladder vs bowel source.  Downtrend in WBC  Monitor vitals, labs    Acute respiratory failure with hypoxia ([facility])  Assessment & Plan  Intubated 5/26 and extubated 6/1  Mobilize as able  Respiratory protocol  Supplemental oxygen and wean as able  CXR shows slight pulmonary edema    PAF (paroxysmal atrial fibrillation) ([facility], [facility])- (present on admission)  Assessment & Plan  Monitor on telemetry  Outpatient metoprolol 25mg daily on hold due to septic shock  Reinitiate once able.  Monitor vitals, labs    Type 2 diabetes mellitus with hyperlipidemia ([facility])- (present on admission)  Assessment & Plan  Elevated glucose 6/2  Holding outpatient glyburide, linagliptin, actos.  Monitor accuchecks and cover with SSI  On lantus 35 units BID    Thrombocytopenia ([facility])- (present on admission)  Assessment & Plan  6/2 Plt:109  Monitor CBC    Acute renal failure superimposed on stage 3a chronic kidney disease ([facility] located [facility])  Assessment & Plan  Nephrology consulting  Hemodialysis as per nephrology  Monitor vitals, I/O's, labs  6/2 BUN:112, Cr:4.17  Avoid nephrotoxins.  Immunosuppression for hx of renal transplant

## 2022-06-02 NOTE — ASSESSMENT & PLAN NOTE
Hx of pAFib , not on OAC at home.   Outpatient metoprolol 25mg daily on hold due to septic shock and borderline hypotension.  Resumed metoprolol 12.5 mg twice daily on 6/10 with holding parameters  Outpatient cardiology/PCP follow up regards OAC use.

## 2022-06-02 NOTE — ASSESSMENT & PLAN NOTE
Nephrology consulting  Hemodialysis as per nephrology. Has been off HD as renal function improving, dialysis catheter has been removed  On IV fluid  Monitor vitals, I/O's, labs  Avoid nephrotoxins.  Immunosuppression for hx of renal transplant    Resolved, Cre is back to his baseline 1.5s

## 2022-06-02 NOTE — THERAPY
Missed Therapy     Patient Name: Jama Altman  Age:  66 y.o., Sex:  male  Medical Record #: [id number]  Today's Date: 6/2/2022 06/02/22 1123   Interdisciplinary Plan of Care Collaboration   Collaboration Comments PT eval order received and attempted. Patient in dialysis. Will attempt back as appropriate.

## 2022-06-02 NOTE — PROGRESS NOTES
Noted when patient was transferred from SICU, no wound supplies were transferred with patient. Notified pharmacy to refill Dakins solution and per [place], is out of stock and will be available tomorrow. Called wound care team Robert MONTENEGRO and ok to use NS for now until Dakins is available.

## 2022-06-02 NOTE — ASSESSMENT & PLAN NOTE
s/p bedside right knee joint aspiration on 5/28.  Synovial fluid analysis reveals hazy priscila fluid, 2638 WBCs with PMN predominance and no crystals seen.  Fluid not consistent with infection.  Culture neg

## 2022-06-02 NOTE — CARE PLAN
The patient is Watcher - Medium risk of patient condition declining or worsening    Shift Goals  Problem: Knowledge Deficit - Standard  Goal: Patient and family/care givers will demonstrate understanding of plan of care, disease process/condition, diagnostic tests and medications  Outcome: Progressing     Problem: Pain - Standard  Goal: Alleviation of pain or a reduction in pain to the patient’s comfort goal  Outcome: Progressing     Problem: Skin Integrity  Goal: Skin integrity is maintained or improved  Outcome: Progressing     Problem: Fall Risk  Goal: Patient will remain free from falls  Outcome: Progressing     Problem: Safety - Medical Restraint  Goal: Remains free of injury from restraints (Restraint for Interference with Medical Device)  Outcome: Progressing     Clinical Goals: MAP goals, mobilize  Patient Goals: have more pillows, watch movie  Family Goals: pt comfort    Progress made toward(s) clinical / shift goals:

## 2022-06-02 NOTE — PROGRESS NOTES
"[facility] -  PROGRESS NOTE               Author: Live Hirsch M.D. Date & Time: 6/2/2022  8:23 AM     HPI:  Patient is a 66 year old male with a PMHx of renal transplant 9/10/2010 for polycystic kidney disease, DM, thrombocytopenia, CKD3b, pafib, covid19 infection, who presents for AMS.  He was brought in activated trauma for fall, CT imaging has been negative, but was in severe septic shock started on pressors and give nfluid boluses.  On broad spectrum abx.  Currently complains of right leg pain but thinks its better.  Confirms his last dose of tacrolimus and cellcept was yesterday.  Currently on levophed    DAILY NEPHROLOGY SUMMARY:  5/24: Consult done  5/25: NAEO, no complaints, feels a bit better, family at bedside  5/26: Decompensated overnight, intubated due to resp. Distress and pressors initiated  5/27: NAEO, no complaints, hemodynamics decompensated and CRRT initiated instead, tolerated well overnight, still on it this AM  5/28: transitioned to daily iHD for now, +13.8L for hospitalization, remains intubated, on pressor support, Tmax 101.1F, WBC 28.9  5/29: tolerated HD, UF 3L 5/28, remains intubated and on pressor support  5/30: Tolerated UF, improved hemodynamics, remains on pressors, remains on mechanical ventilation   5/31: Seen on Dialysis, vasc cath sluggish despite TPA, only able to run   6/1: Tolerated HD, extubated, UOP increasing-2.7L yesterday, net neg 5L  6/2: Intermittent encephalopathy per wife,  2.5L UOP yesterday, soft Bps, feeling stronger today    REVIEW OF SYSTEMS:    Unable to obtain, intubated    PMH/PSH/SH/FH:   Reviewed and unchanged since admission note    CURRENT MEDICATIONS:   Reviewed from admission to present day    VS:  VS:  /64   Pulse 88   Temp 36.1 °C (97 °F) (Temporal)   Resp 19   Ht 1.956 m (6' 5\")   Wt 122 kg (268 lb 15.4 oz)   SpO2 96%   BMI 31.89 kg/m²   GENERAL: Ill appearing  CV: +pedal edema  RESP:non-labored  GI: " Soft  MSK: No joint deformities   SKIN: ecchymoses  NEURO: No tremor  PSYCH: Deferred    Fluids:  In: 1318.1 [I.V.:58.1; Enteral:180]  Out: 2675     LABS:  Recent Labs     05/31/22  0411 06/01/22  0425 06/02/22  0518   SODIUM 137 136 139   POTASSIUM 5.4 4.1 3.7   CHLORIDE 100 97 94*   CO2 24 25 27   GLUCOSE 239* 146* 336*   BUN 68* 81* 112*   CREATININE 3.29* 3.52* 4.17*   CALCIUM 8.0* 7.8* 7.3*     IMAGING:   All imaging reviewed from admission to present day    IMPRESSION:  # SANKET  - Etiology likely 2/2 ATN  - has received contrast  - RRT dependent since 5/27, now with signs of recovery  # DDKT  - 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 Coli bacteremia/septic shock  - Source unclear, ?colitis on imaging  - Abx on board  - pressor support  # Hyperkalemia, improved  - No ECG changes  # Encephalopathy  - Etiology likely 2/2 hypoperfusion/sepsis  # Acute respiratory failure  - Intubated 5/26  # RLE ulcer  - Trauma  # type 2 DM  # thrombocytopenia  - worsening    PLAN:  -Plan for HD today. Attempting to use current vasc cath, may need to replace if sluggish flows again.  -Hold lasix todau  -tacrolimus 1mg BID, holding MMF for now  -Daily labs and weights  -Monitor I/O  -Dose all meds per eGFR < 15    Thank you

## 2022-06-02 NOTE — PROGRESS NOTES
"Critical Care Progress Note    Date of admission  5/24/2022    Chief Complaint  66 y.o. male admitted 5/24/2022 with septic shock    Hospital Course  \"66y M Hx of renal transplant 9/10/2010 for end stage polycystic kidney dz maintain on prednisone, tacrolimus and mcyophenolate, DM, chronic thrombocytopenia, CKD 3B, Pafib, bronchitis, Covid 19 11/2020. That on Sunday hit his shin. He since has been complaining of pain. This morning he called his younger brother. His brother went to his house found him altered. He was brought in by EMS as a trauma activation since he had AMS and bruising from his fall. CT head negative, CTA chest negative, CT abdomen negative, CT spine negative other then degenerative findings. He was in severe septic shock, started on pressors and fluid bolus. He was given broad spectrum microbials. His only compliant is his right leg. LP is currently pending and was traumatic. He would like to be full code.\" - Dr. Swartz      Interval Problem Update    Patient is off pressors and off sedation. Has not had respiratory distress since extubation.  Did not have dialysis yesterday.  Resumed dvt prophylaxis  Increased glargine to 35 units BID because glucose still elevated and has ranged in 300s.  Tapering hydrocortisone off while reintroducing home prednisone  Still on furosemide 80 mg IV twice daily per nephrology  Transfer order to floor has been placed  Removing central line    Review of Systems  Review of Systems   Unable to perform ROS: Intubated        Vital Signs for last 24 hours   Temp:  [36.1 °C (97 °F)-36.7 °C (98 °F)] 36.1 °C (97 °F)  Pulse:  [] 90  Resp:  [17-57] 20  BP: ()/(50-78) 124/69  SpO2:  [89 %-98 %] 89 %    Respiratory Information for the last 24 hours       Physical Exam   Physical Exam  Vitals and nursing note reviewed.   Constitutional:       General: He is awake. He is not in acute distress.     Appearance: Normal appearance. He is normal weight. He is not " diaphoretic.   HENT:      Head: Normocephalic and atraumatic.      Right Ear: External ear normal.      Left Ear: External ear normal.      Nose: Nose normal. No congestion or rhinorrhea.      Comments: Nasal core track in place     Mouth/Throat:      Mouth: Mucous membranes are moist.      Pharynx: No oropharyngeal exudate.   Eyes:      General: No scleral icterus.     Conjunctiva/sclera: Conjunctivae normal.      Pupils: Pupils are equal, round, and reactive to light.   Cardiovascular:      Rate and Rhythm: Normal rate and regular rhythm. Occasional extrasystoles are present.     Chest Wall: PMI is displaced.      Pulses: Decreased pulses.           Radial pulses are 1+ on the right side and 1+ on the left side.      Heart sounds: No murmur heard.  Pulmonary:      Effort: Tachypnea present. No accessory muscle usage or respiratory distress.      Breath sounds: No stridor. No wheezing.   Abdominal:      General: Bowel sounds are normal. There is no distension.      Palpations: Abdomen is soft.      Tenderness: There is no abdominal tenderness. There is no guarding.   Musculoskeletal:      Cervical back: Neck supple. No rigidity.      Right lower leg: Edema present.      Left lower leg: Edema present.      Comments: Bilateral lower extremity erythema and purplish discoloration with wounds, minimally tender to palpation   Skin:     General: Skin is warm and dry.      Capillary Refill: Capillary refill takes 2 to 3 seconds.      Coloration: Skin is not pale.   Neurological:      General: No focal deficit present.      Mental Status: He is alert.      Cranial Nerves: No cranial nerve deficit.      Comments: Follows commands, moves all extremities   Psychiatric:         Behavior: Behavior is cooperative.         Medications  Current Facility-Administered Medications   Medication Dose Route Frequency Provider Last Rate Last Admin   • heparin injection 5,000 Units  5,000 Units Subcutaneous Q12HRS James Titus M.D.   5,000  Units at 06/02/22 0819   • insulin GLARGINE (Lantus,Semglee) injection  35 Units Subcutaneous BID James Titus M.D.       • insulin regular (HumuLIN R,NovoLIN R) injection  3-14 Units Subcutaneous Q6HRS James Titus M.D.   10 Units at 06/02/22 0524    And   • dextrose 50% (D50W) injection 25 g  25 g Intravenous Q15 MIN PRN James Titus M.D.       • hydrocortisone sodium succinate PF (Solu-CORTEF) 100 MG injection 25 mg  25 mg Intravenous Q12HRS Jeremy M Gonda, M.D.       • [START ON 6/4/2022] predniSONE (DELTASONE) tablet 5 mg  5 mg Enteral Tube DAILY Jeremy M Gonda, M.D.       • midodrine (PROAMATINE) tablet 5 mg  5 mg Enteral Tube TID WITH MEALS Jeremy M Gonda, M.D.   5 mg at 06/02/22 1027   • miconazole 2%-zinc oxide (Mabel) topical cream   Topical BID Jeremy M Gonda, M.D.   Given at 06/02/22 0522   • tacrolimus (PROGRAF) 1 mg/mL oral suspension 1 mg  1 mg Enteral Tube Q12HRS Live Hirsch M.D.   1 mg at 06/02/22 0523   • oxyCODONE immediate-release (ROXICODONE) tablet 5 mg  5 mg Enteral Tube Q4HRS PRN Jeremy M Gonda, M.D.   5 mg at 05/30/22 1159   • meropenem (Merrem) 500 mg in  mL IV-MBP  500 mg Intravenous DAILY Jeremy M Gonda, M.D.   Stopped at 06/01/22 1827   • artificial tears (EYE LUBRICANT) ophth ointment 1 Application  1 Application Both Eyes Q8HRS Reuben Swartz M.D.   1 Application at 06/02/22 0523   • heparin injection 2,800 Units  2,800 Units Intracatheter DIALYSIS PRN James Sheppard M.D.   2,800 Units at 05/31/22 1300   • acetaminophen (Tylenol) tablet 650 mg  650 mg Enteral Tube Q6HRS PRN GENEVA Vickers.P.R.N.   650 mg at 05/28/22 0515   • Pharmacy Consult: Enteral tube insertion - review meds/change route/product selection  1 Each Other PHARMACY TO DOSE Reuben Swartz M.D.       • Respiratory Therapy Consult   Nebulization Continuous RT Reuben Swartz M.D.       • senna-docusate (PERICOLACE or SENOKOT S) 8.6-50 MG per tablet 2 Tablet  2 Tablet Enteral Tube BID Reuben LIGHT  MERCED Swartz   2 Tablet at 05/31/22 0548    And   • polyethylene glycol/lytes (MIRALAX) PACKET 1 Packet  1 Packet Enteral Tube QDAY PRN Reuben Swartz M.D.        And   • magnesium hydroxide (MILK OF MAGNESIA) suspension 30 mL  30 mL Enteral Tube QDAY PRN Reuben Swartz M.D.        And   • bisacodyl (DULCOLAX) suppository 10 mg  10 mg Rectal QDAY PRN Reuben Swartz M.D.       • sodium chloride (OCEAN) 0.65 % nasal spray 2 Spray  2 Spray Nasal Q2HRS PRN Kayy Hopkins M.D.       • dakins 0.125% (1/4 strength) topical soln   Topical BID Reuben Swartz M.D.   473 mL at 06/02/22 0601   • HYDROmorphone (Dilaudid) injection 0.5 mg  0.5 mg Intravenous Q2HRS PRN Tracey Whitfield M.D.   0.5 mg at 05/31/22 0059       Fluids    Intake/Output Summary (Last 24 hours) at 6/2/2022 1117  Last data filed at 6/2/2022 1000  Gross per 24 hour   Intake 1391 ml   Output 3375 ml   Net -1984 ml       Laboratory          Recent Labs     05/30/22  1920 05/31/22  0002 05/31/22 0411 06/01/22 0425 06/02/22  0518   SODIUM  --   --  137 136 139   POTASSIUM  --   --  5.4 4.1 3.7   CHLORIDE  --   --  100 97 94*   CO2  --   --  24 25 27   BUN  --   --  68* 81* 112*   CREATININE  --   --  3.29* 3.52* 4.17*   MAGNESIUM  --   --   --   --  2.4   PHOSPHORUS 2.1* 1.8*  --   --  7.7*   CALCIUM  --   --  8.0* 7.8* 7.3*     Recent Labs     05/31/22 0411 06/01/22 0425 06/02/22  0518   GLUCOSE 239* 146* 336*     Recent Labs     05/31/22 0411 06/01/22  0425 06/02/22  0518   WBC 19.2* 16.7* 13.9*   NEUTSPOLYS 89.40* 88.50* 89.60*   LYMPHOCYTES 3.50* 4.40* 3.50*   MONOCYTES 5.30 4.40 5.20   EOSINOPHILS 0.00 0.00 0.00   BASOPHILS 0.00 0.00 0.00     Recent Labs     05/31/22  0411 06/01/22  0425 06/02/22  0518   RBC 4.36* 4.21* 4.55*   HEMOGLOBIN 12.5* 12.1* 12.9*   HEMATOCRIT 38.8* 37.8* 40.4*   PLATELETCT 46* 77* 109*       Imaging  X-Ray:  I have personally reviewed the images and compared with prior images. and My impression is: Unchanged  bilateral lower lobe infiltrate/atelectasis with edema pattern, endotracheal tube/gastric tube/left hemodialysis catheter in good position.  Right IJ hemodialysis catheter 6 cm to deep  CT:    Reviewed  Echo:   Reviewed  Ultrasound:  Reviewed    Assessment/Plan  * Septic shock (HCC)- (present on admission)  Assessment & Plan  Concerns for Toxic shock syndrome with his erythroderma  Empiric rx clinda + zosyn + linezolid transitioned to Zosyn guided by ID with his E Coli bacteremia. Monitor need for surgical debridement appreciate consultation  S/p sepsis protocol and aggressive fluid resuscitation  Continue to titrate vasopressor support to maintain MAP >65 (added vasopressin)  Tapering hydrocortisone off while reintroducing home prednisone  Added midodrine  ID consulted  Cultures remain negative  Wound care  Ordered PT OT speech after extubation    Bacteremia due to Escherichia coli- (present on admission)  Assessment & Plan  Per ID, 10 day course of meropenem (5/28~)  repeat blood cultures remain negative    Knee effusion, right  Assessment & Plan  S/p arthrocentesis 5/28   fluid culture negative    Cardiac arrest (HCC)  Assessment & Plan  PEA arrest 5/27  Continue supportive care  Monitor neurologic function    Acute metabolic encephalopathy  Assessment & Plan  Secondary to sepsis -improving  Close neurologic monitoring  Limit sedatives    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  Extubated May 31   RT/O2 protocol  Limit  sedatives, add oxycodone as needed pain  Dialysis for fluid removal    PAF (paroxysmal atrial fibrillation) (formerly [facility])- (present on admission)  Assessment & Plan  hx of continue to optimize filling pressure and electrolytes, hr is rate controlled,   Unable to tolerate anticoagulation due to thrombocytopenia  S/p trial of IV digoxin 5/28, hold additional for now  Optimize electrolytes, continuous telemetry monitoring    Type 2 diabetes mellitus with hyperlipidemia (formerly [facility])- (present on admission)  Assessment & Plan  With uncontrolled hyperglycemia.  On oral medications at home  Status post insulin drip, added glargine and high sliding scale.  Diabetic enteral nutrition monitoring glucose closely  Continue statin    Thrombocytopenia (formerly [facility])- (present on admission)  Assessment & Plan  Acute on chronic secondary to sepsis  Holding systemic anticoagulation for DVT prophylaxis  Thrombocytopenia improving once back to baseline may resume DVT prophylaxis  Monitor for bleeding, daily CBC    Acute renal failure superimposed on stage 3a chronic kidney disease (formerly [facility])  Assessment & Plan  Hx of CKD s/p renal transplant, acute renal injury related to septic shock and ischemic atn s/p contrast die load  Avoid nephrotoxins  Continue Lasix  Nephrology consulted  Daily dialysis ongoing

## 2022-06-02 NOTE — ASSESSMENT & PLAN NOTE
Blood culture pos E coli on 5/24. Repeated blood culture negative to date. Source likely sec to RLE cellulitis  Completed 10 days course of IV meropenem (started on 5/28 given new fever and worsening chest x-ray) on 6/6  ID consulted

## 2022-06-02 NOTE — WOUND TEAM
[facility]  Inpatient Services  Initial Wound and Skin Care Evaluation    Admission Date: 5/24/2022     Last order of IP CONSULT TO WOUND CARE was found on 5/24/2022 from Hospital Encounter on 5/24/2022     HPI, PMH, SH: Reviewed    Past Surgical History:   Procedure Laterality Date   • KNEE MANIPULATION  2/16/2012    Performed by LATOYA CONNER at [facility]   • KNEE UNICOMPARTMENTAL  12/23/2011    Performed by LATOYA CONNER at SURGERY [facility] ORS   • KNEE ARTHROSCOPY  12/23/2011    Performed by LATOYA CONNER at [facility]   • KNEE ARTHROSCOPY  5/3/2011    Performed by HANANE GOLDMAN at SURGERY SAME DAY [facility] ORS   • MENISCECTOMY, KNEE, MEDIAL  5/3/2011    Performed by HANANE GOLDMAN at SURGERY SAME DAY [facility] ORS   • OTHER  9/10/10    RIGHT KIDNEY TRANSPLANT   • KNEE ARTHROPLASTY TOTAL  1/12/07    RIGHT   • KNEE ARTHROSCOPY  4/10/06    RIGHT   • OTHER ORTHOPEDIC SURGERY  7/8/74    LEFT KNEE DEBRIDEMENT     Social History     Tobacco Use   • Smoking status: Never Smoker   • Smokeless tobacco: Never Used   Substance Use Topics   • Alcohol use: No     Chief Complaint   Patient presents with   • GLF     Diagnosis: Septic shock (HCC) [A41.9, R65.21]    Unit where seen by Wound Team: S114/00     WOUND CONSULT/FOLLOW UP RELATED TO:  Follow up - RLE wound. Consult - RLE, Right Foot, Left Knee, Sacrum/Buttocks     WOUND HISTORY:  Patient presented to ED after ground level fall. Hx type 2 DM, stage III CKD, afib.+ AMS and RLE pain on admission. Pt admitted with sepsis secondary to bacteremia. Source is likely cellulitis. Wound to RLE likely from fall.     6/1 Wound consult received for open blistering to right leg and right foot, intact blister to left knee, and excoriation to buttocks    WOUND ASSESSMENT/LDA                Wound 05/25/22 Full Thickness Wound Pretibial Anterior Right w/ circumferential soft tissue necrosis (Active)   Wound Image         Site Assessment  Yellow;Red;Black;    Periwound Assessment Red;Blistered;Fragile;    Margins Undefined edges;Unattached edges;    Closure Secondary intention    Drainage Amount None    Drainage Description Serosanguineous    Treatments Cleansed;Site care    Wound Cleansing Approved Wound Cleanser    Periwound Protectant Not Applicable    Dressing Cleansing/Solutions 1/4 Strength Dakin's Solution    Dressing Options Plain Strip Packing;Adaptic;Absorbent Abdominal Pad;Dry Roll Gauze;    Dressing Changed Changed    Dressing Status Clean;Dry;Intact    Dressing Change/Treatment Frequency Every Shift, and As Needed    NEXT Dressing Change/Treatment Date 06/02/22    NEXT Weekly Photo (Inpatient Only) 06/08/22    Non-staged Wound Description Full thickness    Wound Length (cm) 1.3 cm    Wound Width (cm) 0.6 cm    Wound Depth (cm) 0.3 cm    Wound Surface Area (cm^2) 0.78 cm^2    Wound Volume (cm^3) 0.234 cm^3    Wound Healing % 44    Undermining (cm) 0.5 cm    Undermining of Wound, 1st Location From 1 o'clock;To 12 o'clock    Shape Oval;Irregular    Wound Odor None    Pulses 1+;Right    Exposed Structures JUDITH    WOUND NURSE ONLY - Time Spent with Patient (mins) 45        Wound 05/31/22 Soft Tissue Necrosis Foot Dorsal Right (Active)   Wound Image      Site Assessment Yellow; Red    Periwound Assessment Pink; Red    Margins Attached Edges; Defined Edges    Closure None    Drainage Amount None    Treatments Cleansed;Site care    Wound Cleansing Approved Wound Cleanser    Periwound Protectant Not Applicable    Dressing Cleansing/Solutions Not Applicable    Dressing Options Adaptic;Absorbent Abdominal Pad;Dry Roll Gauze    Dressing Changed Changed    Dressing Status Clean;Dry;Intact    Dressing Change/Treatment Frequency Every Shift, and As Needed    NEXT Dressing Change/Treatment Date 06/02/22    NEXT Weekly Photo (Inpatient Only) 06/08/22    Wound Length (cm) 4.2 cm    Wound Width (cm) 2 cm    Wound Surface Area (cm^2) 8.4 cm^2    Shape  "Irregular    Wound Odor None    Pulses 1+;Right    Exposed Structures None          Moisture Associated Skin Damage 22 Buttock (Active)   Wound Image      NEXT Weekly Photo (Inpatient Only) 22    Drainage Amount Small    Drainage Description Serosanguineous    Periwound Assessment Red;Fragile    IAD Cleansing Foam Cleanser/Washcloth    Periwound Protectant Barrier Paste;Viscopaste    IAD Containment Device Bowel Management System          Vascular:    MIRELLA:   No results found.    Lab Values:    Lab Results   Component Value Date/Time    WBC 13.9 (H) 2022 05:18 AM    RBC 4.55 (L) 2022 05:18 AM    HEMOGLOBIN 12.9 (L) 2022 05:18 AM    HEMATOCRIT 40.4 (L) 2022 05:18 AM    CREACTPROT 15.09 (H) 2022 09:40 AM    SEDRATEWES <1 2020 07:16 AM    HBA1C 10.7 (H) 2022 03:25 PM        Culture Results show:  No results found for this or any previous visit (from the past 720 hour(s)).    Pain Level/Medicated:  Intermittent pain during wound care       INTERVENTIONS BY WOUND TEAM:  Chart and images reviewed. Discussed with bedside RN. All areas of concern (based on picture review, LDA review and discussion with bedside RN) have been thoroughly assessed. Documentation of areas based on significant findings. This RN in to assess patient. Performed standard wound care which includes appropriate positioning, dressing removal and non-selective debridement. Pictures and measurements obtained weekly if/when required.  Preparation for Dressing removal: Removed without difficulty  Non-selectively Debrided with:  NS and gauze.  Sharp debridement: NA  Nadeen wound: Cleansed with cleanser and gauze    Right Pretibial Wound   Primary Dressin/4 strength Dakin's soaked 1/4\" plain strip packing with tail left outside of wound    RLE and Right Foot  Primary Dressing: adaptic over open areas, abd pad, kerlix, hypafix tape    Left Knee  Primary Dressing: Left GABBY , may apply same dressing as above " if it opens    Sacrum  viscopaste and barrier paste (miconazole ordered)    Interdisciplinary consultation: Patient, Wound RN ([place])    EVALUATION / RATIONALE FOR TREATMENT:  Most Recent Date:  6/1: Right pretibial wound bed now covered almost entirely with slough. Decrease in undermining. Continue with Dakin's packing. Scattered partial thickness wounds now present to RLE circumferentially as well as right foot. Appears to be soft tissue necrosis vs ischemic injury. MD updated. Left knee with dark red and purple discoloration. May evolve similarly to RLE wounds. Left GABBY as skin is still intact. MASD noted to buttocks. Miconazole ordered for antifungal and viscopaste applied. Viscopatch zinc impregnated gauze to encourage re-epithelialization of superficial 100% viable wound bed, and to provide a non-stick wound contact layer.  5/25: RLE edematous and painful to touch. CMS inact. Small, detached nonviable tissue trimmed from distal aspect of wound. Large amount of tan drainage expressed from wound with continued oozing throughout wound care. Plain strip packing packed into undermining area. Dakin's ordered for future changes to chemically debride wound and manage bioburden. Compression applied to help reduce swelling. Wound Team to follow.     Goals: Steady decrease in wound area and depth weekly.    WOUND TEAM PLAN OF CARE ([X] for frequency of wound follow up,):   Nursing to follow orders written for wound care. Contact wound team if area fails to progress, deteriorates or with any questions/concerns  Dressing changes by wound team:                   Follow up 3 times weekly:                NPWT change 3 times weekly:     Follow up 1-2 times weekly:  X weekly    Follow up Bi-Monthly:                   Follow up as needed:     Other (explain):     NURSING PLAN OF CARE ORDERS (X):  Dressing changes: See Dressing Care orders: X  Skin care: See Skin Care orders:   RN Prevention Protocol:   Rectal tube care: See  Rectal Tube Care orders:   Other orders:    RSKIN:   CURRENTLY IN PLACE (X), APPLIED THIS VISIT (A), ORDERED (O):   Q shift Javad:  X  Q shift pressure point assessments:  X    Surface/Positioning   Pressure redistribution mattress            Low Airloss  X        Bariatric foam      Bariatric ORTEGA     Waffle cushion        Waffle Overlay          Reposition q 2 hours      TAPs Turning system     Z Kaleb Pillow     Offloading/Redistribution   Sacral Mepilex (Silicone dressing)     Heel Mepilex (Silicone dressing)         Heel float boots (Prevalon boot)   X          Float Heels off Bed with Pillows   X        Respiratory   Silicone O2 tubing     X    Gray Foam Ear protectors     Cannula fixation Device (Tender )          High flow offloading Clip    Elastic head band offloading device      Anchorfast                                                         Trach with Optifoam split foam             Containment/Moisture Prevention     Rectal tube or BMS  X  Purwick/Condom Cath        Epps Catheter    Barrier wipes           Barrier paste       Antifungal tx      Interdry        Mobilization JUDITH    Up to chair        Ambulate      PT/OT      Nutrition       Dietician        Diabetes Education      PO    TF   X  TPN     NPO   # days     Other        Anticipated discharge plans: TBD  LTACH:        SNF/Rehab:                  Home Health Care:           Outpatient Wound Center:            Self/Family Care:        Other:                  Vac Discharge Needs:   Not Applicable Pt not on a wound vac:  X     Regular Vac while inpatient, alternative dressing at DC:        Regular Vac in use and continued at DC:            Reg. Vac w/ Skin Sub/Biologic in use. Will need to be changed 2x wkly:      Veraflo Vac while inpatient, ok to transition to Regular Vac on Discharge:           Veraflo Vac while inpatient, will need to remain on Veraflo Vac upon discharge:

## 2022-06-02 NOTE — PROGRESS NOTES
Called report to Angela MONTENEGRO on Aicha 5, plan of care was discussed. Room is still being cleaned at this time.

## 2022-06-02 NOTE — PROGRESS NOTES
Received report from Sierra in SICU and then patient was transferred to dialysis for most of the morning.     1419- received report from Jeanette MONTENEGRO from dialysis.     1430- patient arrived to unit and transferred to unit bed. Noted rectal tube in place. Cortrak to right nare is in place and TF running at 45ml/hr of novosource as ordered. Patient is on 3L NC. Dressing to the RLE is CDI. Call light within reach. Will continue to monitor.

## 2022-06-02 NOTE — THERAPY
Missed Therapy     Patient Name: Jama Altman  Age:  66 y.o., Sex:  male  Medical Record #: [id number]  Today's Date: 6/2/2022 06/02/22 1159   Initial Contact Note    Initial Contact Note Order Received and Verified, Occupational Therapy Evaluation in Progress with Full Report to Follow.   Interdisciplinary Plan of Care Collaboration   Collaboration Comments OT eval attempted however pt txf off floor   Session Information   Date / Session Number  6/2

## 2022-06-02 NOTE — PROGRESS NOTES
[facility] Services Progress Note    Hemodialysis treatment x 3 hours completed as ordered per Dr. Hirsch  Treatment started at 1053 and ended at 1353.      Net UF Removed: 0 mL per orders    Patient tolerated treatment with hypotensive episode x1 requiring NS support with improvement in BP, symptomatic reports some dizziness, BPs soft at intervals, otherwise stable  Left IJ CVC access patent, however with sluggish flow alyx arterial/red port, blood lines reversed throughout treatment with max BFR of 220-250 d/t high arterial pressures, Dr. Hirsch notified and aware  Patient VS stable during and post treatment.   See Acute HD flow sheets on clinical data notes under media for details.      Post tx access: Left IJ HD catheter flushed with saline then locked with heparin 1000 units/ml per designated amount in each lumen (see MAR) then clamped, capped aseptically and labeled properly. CVC dressings clean, dry and intact and labeled properly. No s/s of infection to HD catheter site. Heparin lock to be aspirated prior to next dialysis/CVC use by dialysis RN only. Please do not flush or draw from ports.    Please notify Nephrologist/Dialysis for follow-up.     1420 Report given to primary care nurse JOHN Cardona RN.    1430 Patient transported to room S520 via bed by this RN and PCT with O2 @3L/NC, patient awake and alert.

## 2022-06-02 NOTE — PROGRESS NOTES
Infectious Disease Progress Note    Author: Tania Briggs M.D. Date & Time of service: 6/2/2022  12:58 PM    Chief Complaint:  Follow-up for septic shock, E. coli bacteremia, right lower extremity cellulitis    Interval History:  5/26 afebrile, WBC 24.5, blood cultures now growing E. Coli, meningitis CSF PCR panel not detected.  Patient remains on pressors.  Patient was noted to be obtunded with respiratory distress early this morning and has not now been intubated.  Renal function worse today.  Daughter and girlfriend at bedside with questions  5/27 T-max 99.7 WBC 26.8, remains on multiple pressors with increasing requirements overnight, remains on the vent however oxygenation requirements improving, renal function improving on CRRT  5/28 T-max 101.1 WBC 28.9.  Patient developed PEA arrest and underwent CPR yesterday.  White count worsening.  Remains on pressors.  Chest x-ray worse this morning.  CT of the right lower extremity shows right knee joint effusion and circumferential edema involving the soft tissues of the right lower leg.  CT abdomen and pelvis some wall thickening in the descending colon related to underdistention or colitis.  Remains sedated.  Ortho PA bedside performing right knee joint aspiration-fluid is cloudy yellow  5/29 afebrile, WBC 20.6 joint aspiration fluid not consistent with infection, remains on the vent however FiO2 decreased to 30%.  Remains on pressors but decreasing requirements.  Sedated  5/30 AF WBC 20.3 getting dialysis. Remains intubated on pressors  5/31 AF WBC 19.2 more awake today feet warmer Culture results and plan of care reviewed with patient and family. Remains intubated on pressors  6/1 AF WBC 16 extubated-denies cough/dyspnea   6/2 AF WBC 13.9 transferred out of ICU-wound care pictures reviewed +ecchymosis, no signs new active infection    Labs Reviewed, Medications Reviewed and Wound Reviewed.    Review of Systems:  Review of Systems   Constitutional: Negative  for fever.   Musculoskeletal: Positive for myalgias.       Hemodynamics:  Temp (24hrs), Av.4 °C (97.5 °F), Min:36.1 °C (97 °F), Max:36.7 °C (98 °F)  Temperature: 36.1 °C (97 °F)  Pulse  Av.8  Min: 43  Max: 159   Blood Pressure : 124/69       Physical Exam:  Physical Exam  Vitals and nursing note reviewed.   Eyes:      General: No scleral icterus.  Cardiovascular:      Rate and Rhythm: Normal rate.   Pulmonary:      Effort: Pulmonary effort is normal. No respiratory distress.   Musculoskeletal:      Comments: LE ecchymosis  RLE foot wound4.2 X 2 cm +granulation   Skin:     Coloration: Skin is not jaundiced.      Findings: Bruising present.         Meds:    Current Facility-Administered Medications:   •  heparin  •  insulin GLARGINE  •  insulin regular **AND** POC blood glucose manual result **AND** NOTIFY MD and PharmD **AND** Administer 20 grams of glucose (approximately 8 ounces of fruit juice) every 15 minutes PRN FSBG less than 70 mg/dL **AND** dextrose bolus  •  hydrocortisone sodium succinate PF  •  [START ON 2022] predniSONE  •  midodrine  •  miconazole 2%-zinc oxide  •  tacrolimus  •  oxyCODONE immediate-release  •  meropenem  •  artificial tears  •  heparin  •  acetaminophen  •  Pharmacy  •  Respiratory Therapy Consult  •  senna-docusate **AND** polyethylene glycol/lytes **AND** magnesium hydroxide **AND** bisacodyl  •  sodium chloride  •  dakins 0.125% (1/4 strength)  •  HYDROmorphone    Labs:  Recent Labs     22  0411 22  0425 22  0518   WBC 19.2* 16.7* 13.9*   RBC 4.36* 4.21* 4.55*   HEMOGLOBIN 12.5* 12.1* 12.9*   HEMATOCRIT 38.8* 37.8* 40.4*   MCV 89.0 89.8 88.8   MCH 28.7 28.7 28.4   RDW 50.3* 50.3* 49.3   PLATELETCT 46* 77* 109*   MPV  --  13.9* 13.3*   NEUTSPOLYS 89.40* 88.50* 89.60*   LYMPHOCYTES 3.50* 4.40* 3.50*   MONOCYTES 5.30 4.40 5.20   EOSINOPHILS 0.00 0.00 0.00   BASOPHILS 0.00 0.00 0.00   RBCMORPHOLO Present Present Present     Recent Labs     22  0411  06/01/22  0425 06/02/22  0518   SODIUM 137 136 139   POTASSIUM 5.4 4.1 3.7   CHLORIDE 100 97 94*   CO2 24 25 27   GLUCOSE 239* 146* 336*   BUN 68* 81* 112*     Recent Labs     05/31/22  0411 06/01/22  0425 06/02/22  0518   CREATININE 3.29* 3.52* 4.17*       Imaging:  CT-CSPINE WITHOUT PLUS RECONS    Result Date: 5/24/2022 5/24/2022 11:38 AM HISTORY/REASON FOR EXAM: Fall and neck pain TECHNIQUE/EXAM DESCRIPTION: CT cervical spine without contrast, with reconstructions. The study was performed on a helical multidetector CT scanner. Thin-section helical scanning was performed from the skull base through T1. Sagittal and coronal multiplanar reconstructions were generated from the axial images. Low dose optimization technique was utilized for this CT exam including automated exposure control and adjustment of the mA and/or kV according to patient size. COMPARISON:  None. FINDINGS: Alignment in the cervical spine is normal. There is no fracture or dislocation. The craniovertebral junction appears intact. The prevertebral and paraspinous soft tissues are unremarkable. There is moderate disc space. C6-7 level with marginal osteophytosis and moderate posterior spurring. The superior mediastinum and lung apices in the field of view are unremarkable.     1.  Moderate osteoarthritic changes at the C6-7 level with disc space narrowing and marginal osteophytosis. Further there is moderate cervical spondylotic changes at this level. 2.  No evidence of cervical spine fracture and/or subluxation.    CT-ABDOMEN-PELVIS WITH    Result Date: 5/24/2022 5/24/2022 11:39 AM HISTORY/REASON FOR EXAM:  trauma red. TECHNIQUE/EXAM DESCRIPTION:   CT scan of the abdomen and pelvis with contrast. Contrast-enhanced helical scanning was obtained from the diaphragmatic domes through the pubic symphysis following the bolus administration of nonionic contrast without complication. 75 mL of Omnipaque 350 nonionic contrast was administered without  complication. Low dose optimization technique was utilized for this CT exam including automated exposure control and adjustment of the mA and/or kV according to patient size. COMPARISON: Complete abdominal sonogram, 9/14/2021. FINDINGS: Lower Chest: Dependent atelectasis in the lung bases. No pleural effusion or pneumothorax. No pericardial effusion. Liver: Normal. Spleen: Unremarkable. Pancreas: Unremarkable. Gallbladder: No calcified stones. Biliary: Nondilated. Adrenal glands: Normal. Kidneys: Absent right kidney. Right lower quadrant transplant kidney without hydronephrosis. Enlarged left kidney with 2 numerous to count fluid attenuation lesions throughout the cortex. Nonobstructing stones in the lower pole of the left kidney. No hydronephrosis or hydroureter. Bowel: Sigmoid colon diverticulosis, without diverticulitis. Normal appendix. Lymph nodes: No adenopathy. Vasculature: Calcified atherosclerotic plaques in the aorta and iliac arteries, without aneurysmal dilation. Peritoneum: Unremarkable without ascites. Musculoskeletal: No acute or destructive process. Multilevel lumbar spondylosis. Pelvis: The bladder is decompressed by an indwelling Epps catheter.. Small umbilical hernia containing fat.     1. No acute posttraumatic findings in the abdomen or pelvis. 2. Bibasilar subsegmental atelectasis. 3. Right pelvic transplant kidney without hydronephrosis. 4. Polycystic left kidney. 5. Diverticulosis without diverticulitis. Normal appendix.    CT-EXTREMITY, LOWER W/O RIGHT    Result Date: 5/24/2022 5/24/2022 4:17 PM HISTORY/REASON FOR EXAM:  Soft tissue infection suspected, lower leg, no prior imaging; necrotizing fasciitis to right lower ext, recieved large amounts of contrast already with renal transplant. TECHNIQUE/EXAM DESCRIPTION AND NUMBER OF VIEWS: CT scan of the RIGHT lower extremity without contrast and including reconstructions. Thin-section noncontrast helical images were obtained. Coronal and  sagittal reconstructions were generated from the axial images. Up to date radiation dose reduction adjustments have been utilized to meet ALARA standards for radiation dose reduction. COMPARISON: None. FINDINGS: Right total knee replacement. There is decreased bony mineralization of the right lower extremity. There is no evidence of ulceration or soft tissue mass in the right lower extremity. There is no evidence of large abscess formation. There is diffuse atherosclerotic calcification of the right superficial femoral artery. There are curvilinear regions of soft tissue attenuation throughout the subcutaneous tissues of the right mid and distal lower leg.     1.  Status post right total knee replacement. 2.  Diffuse atherosclerotic calcification of the arterial system of the right lower extremity suspicious for diabetes mellitus. 3.  Soft tissue attenuation in the subcutaneous tissues of the mid to distal lower leg and foot suspicious for cellulitis. 4.  No evidence of soft tissue ulceration in the right lower leg or foot. 5.  No evidence of acute osteomyelitis in the right lower leg or foot. 6.  Small subcutaneous abscesses cannot be excluded without the use of intravenous contrast.    CT-HEAD W/O    Result Date: 5/24/2022 5/24/2022 11:38 AM HISTORY/REASON FOR EXAM:  trauma red. TECHNIQUE/EXAM DESCRIPTION AND NUMBER OF VIEWS: CT of the head without contrast. The study was performed on a helical multidetector CT scanner. Contiguous axial sections were obtained from the skull base through the vertex. Up to date radiation dose reduction adjustments have been utilized to meet ALARA standards for radiation dose reduction. COMPARISON:  None available FINDINGS: The calvariae and skull base are unremarkable. There are no extraaxial fluid collections. There is a pattern of cerebral atrophy manifest as enlargement of sulcal markings and ventricular prominence. The ventricular system and basal cisterns are otherwise  unremarkable. There are no areas of abnormal density in the brain substance. There are no hemorrhagic lesions. There are no mass effects or shift of midline structures. The brainstem and posterior fossa structures are unremarkable. Paranasal sinuses in the field of view are unremarkable. Mastoids in the field of view are unremarkable.     1.  Cerebral atrophy. 2.  Otherwise, Head CT without contrast within normal limits. No evidence of acute cerebral infarction, hemorrhage or mass lesion.     DX-CHEST-LIMITED (1 VIEW)    Result Date: 5/25/2022 5/25/2022 7:52 AM HISTORY/REASON FOR EXAM:  Central line placement TECHNIQUE/EXAM DESCRIPTION AND NUMBER OF VIEWS: Single portable view of the chest. COMPARISON: Chest radiography, 5/25/2022 at 0719 hours FINDINGS: Lungs: Symmetric low lung volumes. Stable linear and patchy opacities are unchanged. Stable small left pleural effusion. The right costophrenic recess is sharp. No visible pneumothorax bilaterally. Mediastinum: Cardiac silhouette size remains enlarged. Other: The right internal jugular central venous access catheter placed in the interval terminates over the right atrium. The remaining visualized bones and soft tissues are stable radiographically.     1. Right internal jugular central venous access catheter placed in the interval terminates over the upper aspect of the right atrium. No postprocedure visible pneumothorax. 2. Stable patchy parenchymal opacities in the lungs, with a stable small left pleural effusion.    DX-CHEST-LIMITED (1 VIEW)    Result Date: 5/24/2022 5/24/2022 11:26 AM HISTORY/REASON FOR EXAM:  Chest Pain. TECHNIQUE/EXAM DESCRIPTION AND NUMBER OF VIEWS: Single AP view of the chest. COMPARISON:  Chest x-ray 11/13/2020 FINDINGS: Lungs:  There are curvilinear opacities in the lung fields bilaterally most pronounced in the perihilar regions. Pleura:  There is no pleural effusion or pneumothorax. Heart and mediastinum:  The heart silhouette is mildly  enlarged Bones:  Normal     1.  Curvilinear perihilar opacities likely atelectasis and/or parenchymal scarring. 2.  Mild cardiomegaly.    DX-CHEST-PORTABLE (1 VIEW)    Result Date: 5/25/2022 5/25/2022 7:12 AM HISTORY/REASON FOR EXAM: Shortness of Breath TECHNIQUE/EXAM DESCRIPTION:  Single AP view of the chest. COMPARISON: Yesterday FINDINGS: Cardiomegaly is observed. The mediastinal contour appears within normal limits.  The central  pulmonary vasculature appears prominent and indistinct. Bilateral lung volumes are diminished.  Diffuse scattered hazy pulmonary parenchymal opacities are seen. Blunting of bilateral costophrenic angles is seen, compatible with trace bilateral pleural effusions. The bony structures appear age-appropriate.     1.  Pulmonary edema and/or infiltrates are identified, which are stable since the prior exam. 2.  Trace bilateral pleural effusions 3.  Cardiomegaly    DX-CHEST-PORTABLE (1 VIEW)    Result Date: 5/24/2022 5/24/2022 1:00 PM HISTORY/REASON FOR EXAM:  Central line placement. TECHNIQUE/EXAM DESCRIPTION AND NUMBER OF VIEWS: Single portable view of the chest. COMPARISON: Chest radiograph, 5/24/2022 at 1147 hours FINDINGS: Lungs: Stable curvilinear opacities in the lung fields, greatest in the perihilar regions, stable when compared to prior study. Stable bibasal parenchymal volume loss. No large volume pleural effusion or visible pneumothorax. No pulmonary vascular congestion or interstitial edema. Mediastinum: The cardiac silhouette size remains enlarged. Other: The left internal jugular central venous access catheter placed in the interval terminates over a persistent left superior vena cava. The remaining visualized bones and soft tissues are stable radiographically.     Left internal jugular central venous access catheter terminates over a persistent left superior vena cava. No postprocedure visible pneumothorax. The remainder is stable.    DX-PELVIS-1 OR 2 VIEWS    Result Date:  5/24/2022 5/24/2022 11:27 AM HISTORY/REASON FOR EXAM:  Pelvic/Hip Pain Following Trauma. TECHNIQUE/EXAM DESCRIPTION AND NUMBER OF VIEWS:  1 view(s) of the pelvis. COMPARISON:  None. FINDINGS: There is normal bony mineralization of the pelvis. There is no evidence of pelvic fracture or osseous lesion. The sacroiliac joints and pubic symphysis are symmetrical.     1.  Unremarkable single AP view of the pelvis.    DX-TIBIA AND FIBULA RIGHT    Result Date: 5/24/2022 5/24/2022 12:17 PM HISTORY/REASON FOR EXAM:  Pain/Deformity Following Trauma. TECHNIQUE/EXAM DESCRIPTION AND NUMBER OF VIEWS:  2 views of the RIGHT tibia and fibula. COMPARISON: Right knee radiography, 12/18/2006 FINDINGS: Bones: Postsurgical changes in the distal right femur and proximal right tibia. Normal bone mineralization. No focal bone lesion. No acute fracture. Joint: Postsurgical changes of right total knee arthroplasty. Ankle mortise is preserved. No subluxation. Soft tissue: Arteriosclerosis. Circumferential right lower leg soft tissue swelling.     1. Right lower leg soft tissue swelling. 2. No acute fracture or subluxation. 3. Postsurgical changes of right total knee arthroplasty.    CT-CTA CHEST PULMONARY ARTERY W/ RECONS    Result Date: 5/24/2022 5/24/2022 11:38 AM HISTORY/REASON FOR EXAM:  Fall and chest pain TECHNIQUE/EXAM DESCRIPTION: CT angiogram scan for pulmonary embolism with contrast, with reconstructions. 1.25 mm helical sections were obtained from the lung apices through the lung bases following the rapid bolus administration of 75 mL of Omnipaque 350 nonionic contrast. Thin-section overlapping reconstruction interval was utilized. Coronal reconstructions were generated from the axial data. MIP post processing was performed and utilized for the interpretation. Low dose optimization technique was utilized for this CT exam including automated exposure control and adjustment of the mA and/or kV according to patient size. COMPARISON:  None FINDINGS: Pulmonary Embolism: No. Main Pulmonary Arteries: No. Segmental branches: No. Subsegmental branches: No. Additional Comments: None. Lungs: There are curvilinear opacities dependently in the lung bases. Pleura: No pleural effusion. Nodes: No enlarged lymph nodes. Additional findings: Diffuse coronary artery calcification.     1.  No CT evidence of pulmonary emboli. 2.  Bibasilar atelectasis. 3.  No evidence of rib fracture. 4. Diffuse coronary artery calcification.    EC-ECHOCARDIOGRAM COMPLETE W/ CONT    Result Date: 2022  Transthoracic Echo Report Echocardiography Laboratory CONCLUSIONS No prior study is available for comparison. The left ventricular ejection fraction is visually estimated to be 35%. Unable to estimate pulmonary artery pressure due to an inadequate tricuspid regurgitant jet. CIARA FORRESTER Exam Date:         2022                    11:35 Exam Location:     Inpatient Priority:          Routine Ordering Physician:        ALEJANDRA FROST Referring Physician: Sonographer:               Jack Lockwood RDCS, RVT Age:    66     Gender:    M MRN:    [id number] :    1955 BSA:    2.4    Ht (in):    77     Wt (lb):    235 Exam Type:     Complete Indications:     Shortness of breath ICD Codes:       786.05 CPT Codes:       99122 BP:   90     /   51     HR:   97 Technical Quality:       Technically difficult study -                          adequate information is obtained MEASUREMENTS  (Male / Female) Normal Values 2D ECHO LV Diastolic Diameter PLAX        4.6 cm                4.2 - 5.9 / 3.9 - 5.3 cm LV Systolic Diameter PLAX         4.1 cm                2.1 - 4.0 cm IVS Diastolic Thickness           1.2 cm                LVPW Diastolic Thickness          1.2 cm                LVOT Diameter                     2 cm                  Estimated LV Ejection Fraction    35 %                  LV Ejection Fraction MOD BP       38.5 %                >= 55   % LV Ejection Fraction MOD 4C       52.3 %                LV Ejection Fraction MOD 2C       40.7 %                IVC Diameter                      1.8 cm                DOPPLER AV Peak Velocity                  1.5 m/s               AV Peak Gradient                  9.5 mmHg              AV Mean Gradient                  5.7 mmHg              LVOT Peak Velocity                0.8 m/s               AV Area Cont Eq vti               1.6 cm2               MV Velocity Time Integral         13.5 cm               Mitral E Point Velocity           0.63 m/s              Mitral E to A Ratio               1.1                   MV Pressure Half Time             62.8 ms               MV Area PHT                       3.5 cm2               MV Deceleration Time              217 ms                TR Peak Velocity                  275 cm/s              PV Peak Velocity                  1.1 m/s               PV Peak Gradient                  4.6 mmHg              RVOT Peak Velocity                0.79 m/s              * Indicates values subject to auto-interpretation LV EF:  35    % FINDINGS Left Ventricle 3 ml of  contrast was used to evaluate for thrombus in the left ventricular apex. Normal left ventricular chamber size. Mild concentric left ventricular hypertrophy. Moderately reduced left ventricular systolic function. The left ventricular ejection fraction is visually estimated to be 35%.There is  regional wall motion abnormalities may be from old MI.indeterminate diastolic function. Right Ventricle Normal right ventricular size. Reduced right ventricular systolic function. Right Atrium Enlarged right atrium. Normal inferior vena cava size and inspiratory collapse. Left Atrium Normal left atrial size. Left atrial volume index is 21  mL/sq m. Mitral Valve The mitral valve is not well visualized. No mitral stenosis. Trace mitral regurgitation. Aortic Valve The aortic valve is not well visualized. No aortic valve stenosis. No  "aortic insufficiency. Tricuspid Valve The tricuspid valve is not well visualized. No tricuspid stenosis. Trace tricuspid regurgitation. Unable to estimate pulmonary artery pressure due to an inadequate tricuspid regurgitant jet. Pulmonic Valve The pulmonic valve is not well visualized. No pulmonic stenosis. Trace pulmonic insufficiency. Pericardium Normal pericardium without effusion. Aorta The ascending aorta diameter is 3.2  cm. Matthew Yoder MD (Electronically Signed) Final Date:     25 May 2022 14:37    [facility] (FOR ED USE ONLY)    Result Date: 5/24/2022 5/24/2022 12:01 PM HISTORY/REASON FOR EXAM:  Ground-level fall with traumatic injury TECHNIQUE/EXAM DESCRIPTION AND NUMBER OF VIEWS:  Limited ultrasound of the abdomen. FAST scan. Focused ultrasound of the 4 quadrants of the abdomen. COMPARISON: None FINDINGS: Focused ultrasound of the 4 quadrants of the abdomen demonstrates no evidence of free fluid in the 4 quadrants.     No free fluid seen in all 4 quadrants. Negative FAST scan.       Micro:  Results     Procedure Component Value Units Date/Time    BLOOD CULTURE [[id number]] Collected: 05/28/22 0850    Order Status: Completed Specimen: Blood from Peripheral Updated: 06/02/22 1100     Significant Indicator NEG     Source BLD     Site PERIPHERAL     Culture Result No growth after 5 days of incubation.    Narrative:      Per Hospital Policy: Only change Specimen Src: to \"Line\" if  specified by physician order.  Right Forearm/Arm    BLOOD CULTURE [[id number]] Collected: 05/28/22 0800    Order Status: Completed Specimen: Blood from Peripheral Updated: 06/02/22 0900     Significant Indicator NEG     Source BLD     Site PERIPHERAL     Culture Result No growth after 5 days of incubation.    Narrative:      Per Hospital Policy: Only change Specimen Src: to \"Line\" if  specified by physician order.  Right Forearm/Arm    FLUID CULTURE W/GRAM STAIN [[id number]] Collected: 05/28/22 1000    Order Status: " Completed Specimen: Synovial Fluid Updated: 05/31/22 0736     Significant Indicator NEG     Source SYNO     Site SYNOVIAL FLUID     Culture Result No growth at 72 hours.     Gram Stain Result Few WBCs.  No organisms seen.      Narrative:      Collected By: [id number] JIM JOSE BEAN  Right knee  Collected By: [id number] JIM JOSE BEAN    GRAM STAIN [[id number]] Collected: 05/28/22 1000    Order Status: Completed Specimen: Synovial Updated: 05/28/22 1210     Significant Indicator .     Source SYNO     Site SYNOVIAL FLUID     Gram Stain Result Few WBCs.  No organisms seen.      Narrative:      Collected By: [id number] JIM JOSE BEAN  Right knee  Collected By: [id number] JIM GARCIA GENEVA    QUANT BRONCHIAL WASHING [[id number]] Collected: 05/26/22 0944    Order Status: Completed Specimen: Respirate Updated: 05/28/22 0958     Significant Indicator NEG     Source RESP     Site BRONCHOALVEOLAR LAVAGE     Culture Result 4,200 cfu/mL usual upper respiratory shaun  including yeast.  No clinically significant Staphylococcus aureus, Methicillin  Resistant Staphylococcus aureus, or Pseudomonas species  isolated.       Gram Stain Result Moderate WBCs.  Few Gram positive cocci.  <5% intracellular organisms.      Narrative:      Collected By: [id number] SANTOSH BUENO.  Collected By: [id number] SANTOSH BUENO.    CSF CULTURE [[id number]] Collected: 05/24/22 1325    Order Status: Completed Specimen: CSF from Tap Updated: 05/27/22 0946     Significant Indicator NEG     Source CSF     Site TAP     Culture Result No growth at 72 hours.     Gram Stain Result Rare WBCs.  No organisms seen.      GRAM STAIN [[id number]] Collected: 05/26/22 0944    Order Status: Completed Specimen: Respirate Updated: 05/26/22 1847     Significant Indicator .     Source RESP     Site BRONCHOALVEOLAR LAVAGE     Gram Stain Result Moderate WBCs.  Few Gram positive cocci.  <5% intracellular organisms.      Narrative:      Collected By: [id number] SANTOSH ROBERTS  B.  Collected By: [id number] SANTOSH LIGHT          Assessment:  66 y.o. man with a history of uncontrolled type 2 diabetes mellitus, history of prior renal transplant on chronic immunosuppression, stage III chronic kidney disease, atrial fibrillation on anticoagulation, and prior right total knee arthroplasty admitted on 5/24/2022 secondary to altered mentation in the setting of a ground-level fall and hit his right shin.  Patient found to be in septic shock with gram-negative bacteremia and right lower extremity cellulitis without any evidence of necrotizing fasciitis.  His right lower extremity cellulitis appears to be improving.    On the morning of 5/26 was noted to be obtunded with respiratory distress so was intubated.    Pertinent diagnoses:  Right knee joint effusion  Persistent fevers, resolved  Septic shock, remains on pressors  Ventilatory dependent respiratory failure, intubated on 5/26. Extubated 5/31  Pneumonia  E. coli bacteremia, source possibly below  Right lower extremity cellulitis  Acute kidney injury on stage III chronic kidney disease, now on CRRT  Rhabdomyolysis  Leukocytosis, decreased  Thrombocytopenia, improved  Acute encephalopathy  Uncontrolled type 2 diabetes mellitus, hemoglobin A1c 10.7  History of right total knee arthroplasty  History of renal transplant on tacrolimus, mycophenolate and prednisone  Recent fall    Plan:    -Follow blood cultures on 5/24-E coli, resistant to ampicillin, intermediate resistance to Unasyn  -Repeat blood cultures on 5/28- negative to date  -Avoid nephrotoxins  -CSF cultures-negative  - BAL culture on 5/26-upper respiratory shaun  -Nephrology following- now on dialysis  -Ultrasound of the right lower extremity with no stenosis or occlusion  -s/p bedside right knee joint aspiration on 5/28.  Synovial fluid analysis reveals hazy priscila fluid, 2638 WBCs with PMN predominance and no crystals seen.  Fluid not consistent with infection.  Culture neg    - Continue  IV meropenem (started on 5/28 given new fever and worsening chest x-ray)  Anticipate 10 day course unless complication. Stop date 6/6/2022  -Pulm toilet    Will sign off-please reconsult if needed

## 2022-06-02 NOTE — THERAPY
Missed Therapy     Patient Name: Jama Altman  Age:  66 y.o., Sex:  male  Medical Record #: [id number]  Today's Date: 6/2/2022    Clinical swallow evaluation completed this AM with recs for FEES diagnostic. Per RN, pt to go to dialysis and then transfer to medical floor. FEES held this date due scheduling conflict. Recommend continuation of NPO/TF with ice chips and FEES Friday 6/3 as able and as pt medically appropriate.

## 2022-06-02 NOTE — THERAPY
"Speech Language Pathology   Clinical Swallow Evaluation     Patient Name: Jama Altman  AGE:  66 y.o., SEX:  male  Medical Record #: [id number]  Today's Date: 6/2/2022     Precautions  Precautions: (P) Nasogastric Tube, Swallow Precautions ( See Comments)  HPI:67 YO male admitted 5/24 2/2 GLF. PMHx: benign essential HTN, hyperlipproteinemia, HTN, pain, polycystic kidney, R kidney transplant. CMHx: septic shock, cardiac arrest, bacteremia d/t escherichia coli, acute respiratory failure with hypoxia, acute renal failure on CKD 3a, knee effusion, acute metabolic encephalopathy, immunosupression d/t chornic steroid use, acute on chronic systolic heart fialure, PAF, DM2 w/ HLD, thrombocytopenia. Pt intubated 5/26-5/31. Head CT 5/24 \"cerebral atrophy\" no acute changes. CXR 6/2 \" Bibasilar underinflation atelectasis which could obscure an additional process. This is unchanged. Interstitial opacities likely pulmonary edema. Persistently enlarged cardiac silhouette\". No [facility] h[facility] at [facility].     Level of Consciousness: Alert, Awake  Affect/Behavior: Appropriate, Cooperative  Follows Directives: Yes  Orientation: Oriented x 4  Hearing: Functional hearing  Vision: Functional vision    Prior Living Situation & Level of Function:  No hx of dysphagia.     Oral Mechanism Evaluation  Facial Symmetry: Equal  Facial Sensation: Equal  Labial Observations: WFL  Lingual Observations: Midline  Dentition: Good  Comments:    Voice  Quality: WFL  Resonance: WFL  Intensity: Appropriate  Cough: WFL  Comments:    Current Method of Nutrition   NGT/Cortrak    Subjective  Pt alert, followed directions, participated with minimal encouragement.      Assessment  Positioning: Wilburn's (60-90 degrees)  Bolus Administration: SLP and Patient  Oxygen Requirements:  3 L Nasal Cannula  Factor(s) Affecting Performance: None    Swallowing Trials  Ice: WFL  Thin Liquid (TN0): Impaired  Mildly Thick Liquid (MT2): Impaired  Liquidised (LQ3): WFL  Pureed (PU4): " Impaired  Minced & Moist (MM5): Impaired  Soft & Bite Sized (SB6): Impaired    Comments:  Timely to mildly delayed swallow initiation and weak hyo-laryngeal elevation to palpation (subjective) appreciated. Clinical s/sx of aspiration appreciated included: immediate coughing with MTL by tsp, delayed coughing with thins and MTL by cup, c/o globus sensation with puree and minced and moist, prolonged mastication with soft and bite size. Pt required mod cues for adequate bolus stripping from utensil and min support to self-feed.     Clinical Impressions  Clinical signs of oropharyngeal dysphagia include prolonged/inefficient mastication, delayed pharyngeal swallow trigger, and coughing. Dysphagia is likely acute related to endotracheal intubation, respiratory failure and sepsis. Instrumental swallow study is indicated to objectively assess swallow function and further direct POC. Short term non oral nutrition/hydration/meds is indicated.     Recommendations  1.  NPO/ TF with 10 single ice chips per hour to reduce xerostomia and maintain the integrity of the swallow musculature.  2.  Instrumentation: FEES  3.  Swallowing Instructions & Precautions:   Supervision: 1:1 feeding with constant supervision  Positioning: Fully upright and midline during oral intake  Medication: Non Oral   Oral Care: BID    SLP following.       Plan    Recommend Speech Therapy 4 times per week until therapy goals are met for the following treatments:  Dysphagia Training and Patient / Family / Caregiver Education.    Discharge Recommendations: (P) Recommend post-acute placement for additional speech therapy services prior to discharge home       Objective       06/02/22 0828   Charge Group   SLP Oral Pharyngeal Evaluation Oral Pharyngeal Evaluation   Total Treatment Time   SLP Time Spent Yes   SLP Oral Pharyngeal Evaluation (Mins) 21   Initial Contact Note    Initial Contact Note  Order Received and Verified, Speech Therapy Evaluation in Progress  "with Full Report to Follow.   Precautions   Precautions Nasogastric Tube;Swallow Precautions ( See Comments)   Vitals   O2 (LPM) 3   O2 Delivery Device Silicone Nasal Cannula   Pain 0 - 10 Group   Therapist Pain Assessment Post Activity Pain Same as Prior to Activity;Nurse Notified;0   Prior Living Situation   Prior Services None   Prior Level Of Function   Communication Within Functional Limits   Swallow Within Functional Limits   Dentition Intact   Dentures None   Hearing Within Functional Limits for Evaluation   Hearing Aid None   Vision Within Functional Limits for Evaluation   Patient's Primary Language English   Dysphagia Rating   Nutritional Liquid Intake Rating Scale Nothing by mouth   Nutritional Food Intake Rating Scale Nothing by mouth   Patient / Family Goals   Patient / Family Goal #1 \"water\"   Short Term Goals   Short Term Goal # 1 Pt will particiate in diagnostic swallow study to confirm/rule out aspiraiton and determine least restrictive diet with min cues.   Education Group   Education Provided Dysphagia   Dysphagia Patient Response Patient;Acceptance;Explanation;Demonstration;Verbal Demonstration;Action Demonstration;Reinforcement Needed   Problem List   Problem List Dysphagia   Anticipated Discharge Needs   Discharge Recommendations Recommend post-acute placement for additional speech therapy services prior to discharge home   Therapy Recommendations Upon DC Dysphagia Training;Community Re-Integration;Patient / Family / Caregiver Education   Interdisciplinary Plan of Care Collaboration   IDT Collaboration with  Nursing   Patient Position at End of Therapy Seated;In Bed;Bed Alarm On;Call Light within Reach;Tray Table within Reach   Collaboration Comments RN aware of results/recs     "

## 2022-06-02 NOTE — ASSESSMENT & PLAN NOTE
Intubated 5/26 and extubated 6/1  Mobilize as able  Respiratory protocol  Supplemental oxygen and wean as able

## 2022-06-02 NOTE — CARE PLAN
Problem: Knowledge Deficit - Standard  Goal: Patient and family/care givers will demonstrate understanding of plan of care, disease process/condition, diagnostic tests and medications  Outcome: Progressing     Problem: Pain - Standard  Goal: Alleviation of pain or a reduction in pain to the patient’s comfort goal  Outcome: Progressing     Problem: Skin Integrity  Goal: Skin integrity is maintained or improved  Outcome: Progressing     Problem: Fall Risk  Goal: Patient will remain free from falls  Outcome: Progressing     Problem: Safety - Medical Restraint  Goal: Remains free of injury from restraints (Restraint for Interference with Medical Device)  Outcome: Progressing  Goal: Free from restraint(s) (Restraint for Interference with Medical Device)  Outcome: Progressing   The patient is Stable - Low risk of patient condition declining or worsening    Shift Goals  Clinical Goals: MAP goals, hemodynamic stability  Patient Goals: rest  Family Goals: patient comfort    Progress made toward(s) clinical / shift goals:  MAP goals within range, stable neuro exams    Patient is not progressing towards the following goals:

## 2022-06-02 NOTE — PROGRESS NOTES
Report given to dialysis RN and plan of care was discussed. Pt planning to go to inpatient dialysis treatment for hemodialysis today and then transfer to his new room S-[id number] after treatment. Family aware of plan.     1040- Pt was transported by bed to dialysis center with all of his belongings. Wife has a bag of home medications with her I asked her to take them home.

## 2022-06-03 ENCOUNTER — APPOINTMENT (OUTPATIENT)
Dept: RADIOLOGY | Facility: MEDICAL CENTER | Age: 67
DRG: 870 | End: 2022-06-03
Attending: HOSPITALIST
Payer: MEDICARE

## 2022-06-03 LAB
ALBUMIN SERPL BCP-MCNC: 2.6 G/DL (ref 3.2–4.9)
ALBUMIN/GLOB SERPL: 0.8 G/DL
ALP SERPL-CCNC: 130 U/L (ref 30–99)
ALT SERPL-CCNC: 35 U/L (ref 2–50)
ANION GAP SERPL CALC-SCNC: 15 MMOL/L (ref 7–16)
ANISOCYTOSIS BLD QL SMEAR: ABNORMAL
AST SERPL-CCNC: 24 U/L (ref 12–45)
BASOPHILS # BLD AUTO: 0 % (ref 0–1.8)
BASOPHILS # BLD: 0 K/UL (ref 0–0.12)
BILIRUB SERPL-MCNC: 0.5 MG/DL (ref 0.1–1.5)
BUN SERPL-MCNC: 101 MG/DL (ref 8–22)
BURR CELLS BLD QL SMEAR: NORMAL
CALCIUM SERPL-MCNC: 7.7 MG/DL (ref 8.5–10.5)
CHLORIDE SERPL-SCNC: 102 MMOL/L (ref 96–112)
CO2 SERPL-SCNC: 27 MMOL/L (ref 20–33)
CREAT SERPL-MCNC: 3.23 MG/DL (ref 0.5–1.4)
CRP SERPL HS-MCNC: 4.82 MG/DL (ref 0–0.75)
EOSINOPHIL # BLD AUTO: 0 K/UL (ref 0–0.51)
EOSINOPHIL NFR BLD: 0 % (ref 0–6.9)
ERYTHROCYTE [DISTWIDTH] IN BLOOD BY AUTOMATED COUNT: 50.2 FL (ref 35.9–50)
GFR SERPLBLD CREATININE-BSD FMLA CKD-EPI: 20 ML/MIN/1.73 M 2
GLOBULIN SER CALC-MCNC: 3.2 G/DL (ref 1.9–3.5)
GLUCOSE BLD STRIP.AUTO-MCNC: 149 MG/DL (ref 65–99)
GLUCOSE BLD STRIP.AUTO-MCNC: 162 MG/DL (ref 65–99)
GLUCOSE BLD STRIP.AUTO-MCNC: 166 MG/DL (ref 65–99)
GLUCOSE BLD STRIP.AUTO-MCNC: 202 MG/DL (ref 65–99)
GLUCOSE BLD STRIP.AUTO-MCNC: 215 MG/DL (ref 65–99)
GLUCOSE SERPL-MCNC: 124 MG/DL (ref 65–99)
HCT VFR BLD AUTO: 45.6 % (ref 42–52)
HGB BLD-MCNC: 14.5 G/DL (ref 14–18)
LG PLATELETS BLD QL SMEAR: NORMAL
LYMPHOCYTES # BLD AUTO: 0.48 K/UL (ref 1–4.8)
LYMPHOCYTES NFR BLD: 4.4 % (ref 22–41)
MACROCYTES BLD QL SMEAR: ABNORMAL
MAGNESIUM SERPL-MCNC: 2.2 MG/DL (ref 1.5–2.5)
MANUAL DIFF BLD: NORMAL
MCH RBC QN AUTO: 29 PG (ref 27–33)
MCHC RBC AUTO-ENTMCNC: 31.8 G/DL (ref 33.7–35.3)
MCV RBC AUTO: 91.2 FL (ref 81.4–97.8)
METAMYELOCYTES NFR BLD MANUAL: 0.9 %
MICROCYTES BLD QL SMEAR: ABNORMAL
MONOCYTES # BLD AUTO: 0.19 K/UL (ref 0–0.85)
MONOCYTES NFR BLD AUTO: 1.7 % (ref 0–13.4)
MORPHOLOGY BLD-IMP: NORMAL
MYELOCYTES NFR BLD MANUAL: 4.4 %
NEUTROPHILS # BLD AUTO: 9.66 K/UL (ref 1.82–7.42)
NEUTROPHILS NFR BLD: 88.6 % (ref 44–72)
NRBC # BLD AUTO: 0 K/UL
NRBC BLD-RTO: 0 /100 WBC
PHOSPHATE SERPL-MCNC: 6.4 MG/DL (ref 2.5–4.5)
PLATELET # BLD AUTO: 126 K/UL (ref 164–446)
PLATELET BLD QL SMEAR: NORMAL
PMV BLD AUTO: 12.1 FL (ref 9–12.9)
POIKILOCYTOSIS BLD QL SMEAR: NORMAL
POLYCHROMASIA BLD QL SMEAR: NORMAL
POTASSIUM SERPL-SCNC: 3.8 MMOL/L (ref 3.6–5.5)
PREALB SERPL-MCNC: 16.4 MG/DL (ref 18–38)
PROT SERPL-MCNC: 5.8 G/DL (ref 6–8.2)
RBC # BLD AUTO: 5 M/UL (ref 4.7–6.1)
RBC BLD AUTO: PRESENT
SODIUM SERPL-SCNC: 144 MMOL/L (ref 135–145)
WBC # BLD AUTO: 10.9 K/UL (ref 4.8–10.8)

## 2022-06-03 PROCEDURE — 700105 HCHG RX REV CODE 258: Performed by: INTERNAL MEDICINE

## 2022-06-03 PROCEDURE — 99232 SBSQ HOSP IP/OBS MODERATE 35: CPT | Performed by: HOSPITALIST

## 2022-06-03 PROCEDURE — 770001 HCHG ROOM/CARE - MED/SURG/GYN PRIV*

## 2022-06-03 PROCEDURE — A9270 NON-COVERED ITEM OR SERVICE: HCPCS | Performed by: INTERNAL MEDICINE

## 2022-06-03 PROCEDURE — 700111 HCHG RX REV CODE 636 W/ 250 OVERRIDE (IP): Performed by: INTERNAL MEDICINE

## 2022-06-03 PROCEDURE — 700102 HCHG RX REV CODE 250 W/ 637 OVERRIDE(OP): Performed by: INTERNAL MEDICINE

## 2022-06-03 PROCEDURE — 80053 COMPREHEN METABOLIC PANEL: CPT

## 2022-06-03 PROCEDURE — 85025 COMPLETE CBC W/AUTO DIFF WBC: CPT

## 2022-06-03 PROCEDURE — 84100 ASSAY OF PHOSPHORUS: CPT

## 2022-06-03 PROCEDURE — 83735 ASSAY OF MAGNESIUM: CPT

## 2022-06-03 PROCEDURE — 36415 COLL VENOUS BLD VENIPUNCTURE: CPT

## 2022-06-03 PROCEDURE — 84134 ASSAY OF PREALBUMIN: CPT

## 2022-06-03 PROCEDURE — 92612 ENDOSCOPY SWALLOW (FEES) VID: CPT

## 2022-06-03 PROCEDURE — 86140 C-REACTIVE PROTEIN: CPT

## 2022-06-03 PROCEDURE — 85007 BL SMEAR W/DIFF WBC COUNT: CPT

## 2022-06-03 PROCEDURE — 82962 GLUCOSE BLOOD TEST: CPT

## 2022-06-03 PROCEDURE — 700111 HCHG RX REV CODE 636 W/ 250 OVERRIDE (IP): Performed by: STUDENT IN AN ORGANIZED HEALTH CARE EDUCATION/TRAINING PROGRAM

## 2022-06-03 PROCEDURE — 700101 HCHG RX REV CODE 250: Performed by: INTERNAL MEDICINE

## 2022-06-03 PROCEDURE — 97166 OT EVAL MOD COMPLEX 45 MIN: CPT

## 2022-06-03 RX ORDER — SODIUM CHLORIDE 450 MG/100ML
INJECTION, SOLUTION INTRAVENOUS CONTINUOUS
Status: ACTIVE | OUTPATIENT
Start: 2022-06-03 | End: 2022-06-03

## 2022-06-03 RX ORDER — MYCOPHENOLATE MOFETIL 250 MG/1
500 CAPSULE ORAL 2 TIMES DAILY
Status: DISCONTINUED | OUTPATIENT
Start: 2022-06-03 | End: 2022-06-03

## 2022-06-03 RX ORDER — MYCOPHENOLATE MOFETIL 200 MG/ML
500 POWDER, FOR SUSPENSION ORAL 2 TIMES DAILY
Status: DISCONTINUED | OUTPATIENT
Start: 2022-06-03 | End: 2022-06-04

## 2022-06-03 RX ADMIN — MIDODRINE HYDROCHLORIDE 5 MG: 5 TABLET ORAL at 09:36

## 2022-06-03 RX ADMIN — INSULIN HUMAN 4 UNITS: 100 INJECTION, SOLUTION PARENTERAL at 00:53

## 2022-06-03 RX ADMIN — INSULIN GLARGINE-YFGN 35 UNITS: 100 INJECTION, SOLUTION SUBCUTANEOUS at 18:50

## 2022-06-03 RX ADMIN — TACROLIMUS 1 MG: 5 CAPSULE ORAL at 21:04

## 2022-06-03 RX ADMIN — MYCOPHENOLATE MOFETIL 500 MG: 200 POWDER, FOR SUSPENSION ORAL at 21:05

## 2022-06-03 RX ADMIN — SODIUM CHLORIDE: 4.5 INJECTION, SOLUTION INTRAVENOUS at 14:29

## 2022-06-03 RX ADMIN — HEPARIN SODIUM 5000 UNITS: 5000 INJECTION, SOLUTION INTRAVENOUS; SUBCUTANEOUS at 18:36

## 2022-06-03 RX ADMIN — HYDROCORTISONE SODIUM SUCCINATE 25 MG: 100 INJECTION, POWDER, FOR SOLUTION INTRAMUSCULAR; INTRAVENOUS at 05:15

## 2022-06-03 RX ADMIN — DAKIN'S SOLUTION 0.125% (QUARTER STRENGTH) 1 ML: 0.12 SOLUTION at 10:16

## 2022-06-03 RX ADMIN — OXYCODONE 5 MG: 5 TABLET ORAL at 18:35

## 2022-06-03 RX ADMIN — INSULIN HUMAN 3 UNITS: 100 INJECTION, SOLUTION PARENTERAL at 12:13

## 2022-06-03 RX ADMIN — DAKIN'S SOLUTION 0.125% (QUARTER STRENGTH) 473 ML: 0.12 SOLUTION at 20:00

## 2022-06-03 RX ADMIN — HYDROCORTISONE SODIUM SUCCINATE 25 MG: 100 INJECTION, POWDER, FOR SOLUTION INTRAMUSCULAR; INTRAVENOUS at 18:35

## 2022-06-03 RX ADMIN — INSULIN GLARGINE-YFGN 35 UNITS: 100 INJECTION, SOLUTION SUBCUTANEOUS at 05:26

## 2022-06-03 RX ADMIN — INSULIN HUMAN 3 UNITS: 100 INJECTION, SOLUTION PARENTERAL at 18:49

## 2022-06-03 RX ADMIN — HEPARIN SODIUM 5000 UNITS: 5000 INJECTION, SOLUTION INTRAVENOUS; SUBCUTANEOUS at 05:16

## 2022-06-03 RX ADMIN — MIDODRINE HYDROCHLORIDE 5 MG: 5 TABLET ORAL at 12:05

## 2022-06-03 RX ADMIN — TACROLIMUS 1 MG: 5 CAPSULE ORAL at 05:16

## 2022-06-03 RX ADMIN — MINERAL OIL, PETROLATUM 1 APPLICATION: 425; 573 OINTMENT OPHTHALMIC at 05:15

## 2022-06-03 RX ADMIN — MICONAZOLE NITRATE: 20 CREAM TOPICAL at 05:22

## 2022-06-03 RX ADMIN — MYCOPHENOLATE MOFETIL 500 MG: 200 POWDER, FOR SUSPENSION ORAL at 12:13

## 2022-06-03 RX ADMIN — INSULIN HUMAN 4 UNITS: 100 INJECTION, SOLUTION PARENTERAL at 05:26

## 2022-06-03 RX ADMIN — MEROPENEM 500 MG: 500 INJECTION, POWDER, FOR SOLUTION INTRAVENOUS at 18:49

## 2022-06-03 RX ADMIN — MIDODRINE HYDROCHLORIDE 5 MG: 5 TABLET ORAL at 18:29

## 2022-06-03 ASSESSMENT — COGNITIVE AND FUNCTIONAL STATUS - GENERAL
DRESSING REGULAR LOWER BODY CLOTHING: TOTAL
DRESSING REGULAR UPPER BODY CLOTHING: A LOT
DAILY ACTIVITIY SCORE: 10
HELP NEEDED FOR BATHING: A LOT
PERSONAL GROOMING: A LOT
TOILETING: TOTAL
EATING MEALS: A LOT
SUGGESTED CMS G CODE MODIFIER DAILY ACTIVITY: CL

## 2022-06-03 ASSESSMENT — PAIN DESCRIPTION - PAIN TYPE
TYPE: ACUTE PAIN

## 2022-06-03 ASSESSMENT — ACTIVITIES OF DAILY LIVING (ADL): TOILETING: INDEPENDENT

## 2022-06-03 NOTE — DISCHARGE PLANNING
Received Choice form at 1016  Agency/Facility Name: [facility]  Referral sent per Choice form @ 1106    Choice saved in .

## 2022-06-03 NOTE — PROGRESS NOTES
Hospital Medicine Daily Progress Note    Date of Service  6/3/2022    Chief Complaint  Jama Altman is a 66 y.o. male admitted 5/24/2022 with     Hospital Course  No notes on file    Interval Problem Update  Patient transferred out of the ICU last night, currently doing well.  Family at bedside wondering when his diet will be advanced.  Awaiting speech therapy evaluations.    Consultants/Specialty  Infectious disease  Critical care medicine  Nephrology    Code Status  Full Code    Disposition  Pending at this time, patient may benefit from a physical medicine and rehabilitation consult once physical and Occupational Therapy evaluations are in the chart.    Review of Systems  All systems reviewed and negative except as noted per above.    Physical Exam  Temp:  [36.3 °C (97.3 °F)-36.6 °C (97.8 °F)] 36.3 °C (97.3 °F)  Pulse:  [] 87  Resp:  [18-19] 18  BP: ()/(59-99) 107/63  SpO2:  [93 %-99 %] 93 %    General appearance: NAD, conversant, family at bedside  Eyes: anicteric sclerae, moist conjunctivae; no lid-lag; PERRLA  HENT: Atraumatic; oropharynx clear with moist mucous membranes and no mucosal ulcerations; normal hard and soft palate  Neck: Trachea midline; FROM, supple, no thyromegaly or lymphadenopathy  Lungs: CTA, with normal respiratory effort and no intercostal retractions  CV: RRR, no MRGs  Abdomen: Soft, non-tender; no masses or HSM  Extremities: No peripheral edema or extremity lymphadenopathy  Skin: Normal temperature, turgor and texture; no rash, ulcers or subcutaneous nodules  Psych: Appropriate affect,       Current Facility-Administered Medications:   •  mycophenolate (CELLCEPT) 200 MG/ML susp 500 mg, 500 mg, Enteral Tube, BID, Live Hirsch M.D., 500 mg at 06/03/22 1213  •  1/2 NS infusion, , Intravenous, Continuous, Live Hirsch M.D., Last Rate: 100 mL/hr at 06/03/22 1429, New Bag at 06/03/22 1429  •  heparin injection 5,000 Units, 5,000 Units, Subcutaneous, Q12HRS, Yo Sup Keum,  M.D., 5,000 Units at 06/03/22 0516  •  insulin GLARGINE (Lantus,Semglee) injection, 35 Units, Subcutaneous, BID, James Titus M.D., 35 Units at 06/03/22 0526  •  insulin regular (HumuLIN R,NovoLIN R) injection, 3-14 Units, Subcutaneous, Q6HRS, 3 Units at 06/03/22 1213 **AND** POC blood glucose manual result, , , Q6H **AND** NOTIFY MD and PharmD, , , Once **AND** Administer 20 grams of glucose (approximately 8 ounces of fruit juice) every 15 minutes PRN FSBG less than 70 mg/dL, , , PRN **AND** dextrose 50% (D50W) injection 25 g, 25 g, Intravenous, Q15 MIN PRN, James Titus M.D.  •  hydrocortisone sodium succinate PF (Solu-CORTEF) 100 MG injection 25 mg, 25 mg, Intravenous, Q12HRS, Jeremy M Gonda, M.D., 25 mg at 06/03/22 0515  •  [START ON 6/4/2022] predniSONE (DELTASONE) tablet 5 mg, 5 mg, Enteral Tube, DAILY, Jeremy M Gonda, M.D.  •  midodrine (PROAMATINE) tablet 5 mg, 5 mg, Enteral Tube, TID WITH MEALS, Jeremy M Gonda, M.D., 5 mg at 06/03/22 1205  •  miconazole 2%-zinc oxide (Mabel) topical cream, , Topical, BID, Jeremy M Gonda, M.D., Given at 06/03/22 0522  •  tacrolimus (PROGRAF) 1 mg/mL oral suspension 1 mg, 1 mg, Enteral Tube, Q12HRS, Live Hirsch M.D., 1 mg at 06/03/22 0516  •  oxyCODONE immediate-release (ROXICODONE) tablet 5 mg, 5 mg, Enteral Tube, Q4HRS PRN, Jeremy M Gonda, M.D., 5 mg at 05/30/22 1159  •  meropenem (Merrem) 500 mg in  mL IV-MBP, 500 mg, Intravenous, DAILY, Jeremy M Gonda, M.D., Stopped at 06/02/22 1859  •  artificial tears (EYE LUBRICANT) ophth ointment 1 Application, 1 Application, Both Eyes, Q8HRS, Reuben Swartz M.D., 1 Application at 06/03/22 0515  •  heparin injection 2,800 Units, 2,800 Units, Intracatheter, DIALYSIS PRN, James Sheppard M.D., 2,800 Units at 06/02/22 1400  •  acetaminophen (Tylenol) tablet 650 mg, 650 mg, Enteral Tube, Q6HRS PRN, GENEVA Vickers.P.R.N., 650 mg at 05/28/22 0515  •  Pharmacy Consult: Enteral tube insertion - review meds/change route/product  selection, 1 Each, Other, PHARMACY TO DOSE, Reuben Swartz M.D.  •  Respiratory Therapy Consult, , Nebulization, Continuous RT, Reuben Swartz M.D.  •  senna-docusate (PERICOLACE or SENOKOT S) 8.6-50 MG per tablet 2 Tablet, 2 Tablet, Enteral Tube, BID, 2 Tablet at 05/31/22 0548 **AND** polyethylene glycol/lytes (MIRALAX) PACKET 1 Packet, 1 Packet, Enteral Tube, QDAY PRN **AND** magnesium hydroxide (MILK OF MAGNESIA) suspension 30 mL, 30 mL, Enteral Tube, QDAY PRN **AND** bisacodyl (DULCOLAX) suppository 10 mg, 10 mg, Rectal, QDAY PRN, Reuben Swartz M.D.  •  sodium chloride (OCEAN) 0.65 % nasal spray 2 Spray, 2 Spray, Nasal, Q2HRS PRN, Kayy Hopkins M.D.  •  dakins 0.125% (1/4 strength) topical soln, , Topical, BID, Reuben Swartz M.D., 1 mL at 06/03/22 1016  •  HYDROmorphone (Dilaudid) injection 0.5 mg, 0.5 mg, Intravenous, Q2HRS PRN, Tracey Whitfield M.D., 0.5 mg at 05/31/22 0059      Fluids    Intake/Output Summary (Last 24 hours) at 6/3/2022 1442  Last data filed at 6/3/2022 1327  Gross per 24 hour   Intake 645 ml   Output 2100 ml   Net -1455 ml       Laboratory  Recent Labs     06/01/22  0425 06/02/22  0518 06/03/22  0942   WBC 16.7* 13.9* 10.9*   RBC 4.21* 4.55* 5.00   HEMOGLOBIN 12.1* 12.9* 14.5   HEMATOCRIT 37.8* 40.4* 45.6   MCV 89.8 88.8 91.2   MCH 28.7 28.4 29.0   MCHC 32.0* 31.9* 31.8*   RDW 50.3* 49.3 50.2*   PLATELETCT 77* 109* 126*   MPV 13.9* 13.3* 12.1     Recent Labs     06/01/22  0425 06/02/22  0518 06/03/22  0942   SODIUM 136 139 144   POTASSIUM 4.1 3.7 3.8   CHLORIDE 97 94* 102   CO2 25 27 27   GLUCOSE 146* 336* 124*   BUN 81* 112* 101*   CREATININE 3.52* 4.17* 3.23*   CALCIUM 7.8* 7.3* 7.7*                   Imaging  DX-ABDOMEN FOR TUBE PLACEMENT   Final Result      Enteric feeding tube terminates with the tip projecting over the expected location of the 2nd-3rd portion of the duodenum.      DX-CHEST-LIMITED (1 VIEW)   Final Result      1.  Bibasilar underinflation atelectasis  which could obscure an additional process. This is unchanged.   2.  Interstitial opacities likely pulmonary edema   3.  Persistently enlarged cardiac silhouette      DX-CHEST-LIMITED (1 VIEW)   Final Result      1.  Hypoinflation with mildly increased left basilar atelectasis.   2.  Removal of endotracheal tube.      DX-CHEST-LIMITED (1 VIEW)   Final Result         No significant interval change.      DX-CHEST-LIMITED (1 VIEW)   Final Result      Stable areas of patchy atelectasis/consolidation.      DX-CHEST-PORTABLE (1 VIEW)   Final Result      Stable chest x-ray findings.      DX-CHEST-PORTABLE (1 VIEW)   Final Result         1.  Pulmonary edema and/or infiltrates, somewhat increased particularly in the right lung base compared to prior study.   2.  Cardiomegaly      CT-ABDOMEN-PELVIS W/O   Final Result         Limited noncontrast exam      1. Long segment wall thickening from the descending colon to the sigmoid colon could relate to underdistention or colitis. Air-fluid level in the more proximal colon is likely diarrheal disease.      2. Right lower quadrant transplant kidney with retained prior contrast, likely secondary to renal failure/ATN/contrast nephropathy. No hydronephrosis.      Absent right kidney. Polycystic left kidney.      CT-EXTREMITY, LOWER W/O RIGHT   Final Result      1. Postsurgical changes of right total knee arthroplasty.   2. Right knee joint effusion with thickening of the joint capsule and surrounding soft tissue edema.   3. Circumferential edema involving the soft tissues of the right lower leg with an anterior soft tissue defect and with areas of clustering on the medial right lower leg skin surface.   4. Arteriosclerosis.      DX-CHEST-PORTABLE (1 VIEW)   Final Result      1.  Unchanged position of supporting devices are visualized extent   2.  No visible pneumothorax following chest compressions   3.  Unchanged atelectasis and possible superimposed interstitial pulmonary edema or  atypical pneumonia      US-EXTREMITY ARTERY LOWER UNILAT RIGHT   Final Result      US-MIRELLA SINGLE LEVEL BILAT   Final Result      US-EXTREMITY ARTERY LOWER BILAT W/MIRELLA (COMBO)   Final Result      DX-CHEST-PORTABLE (1 VIEW)   Final Result         1.  Pulmonary edema and/or infiltrates, somewhat increased particularly in the right lung base compared to prior study.   2.  Cardiomegaly      DX-ABDOMEN FOR TUBE PLACEMENT   Final Result      1. The tip of the feeding tube terminates over the junction of the gastric pylorus and duodenal bulb.   2. The remainder is stable.      DX-CHEST-PORTABLE (1 VIEW)   Final Result      1. Interval decrease in size of the right pleural effusion.   2. No left pleural effusion.   3. No visible pneumothorax status post bronchoscopy.   4. Interval placement of a Dobbhoff feeding tube.   5. Improving parenchymal loss in the right lung base, incompletely resolved.   6. The remainder is stable.      DX-CHEST-PORTABLE (1 VIEW)   Final Result      1. Interval development of a moderate right pleural effusion after placement of a left internal jugular dialysis catheter, abutting the right lateral wall of the right atrium. Verification of line position with contrast injection under fluoroscopy is    offered.   2. Improved perihilar atelectasis.   3. The remainder is stable.      I, Dr. Matthew Brown, discussed the results of this examination directly by phone with Dr. Reuben Swartz on 5/26/2022 at 0855 hours.      EC-ECHOCARDIOGRAM COMPLETE W/ CONT   Final Result      DX-CHEST-LIMITED (1 VIEW)   Final Result         1. Right internal jugular central venous access catheter placed in the interval terminates over the upper aspect of the right atrium. No postprocedure visible pneumothorax.   2. Stable patchy parenchymal opacities in the lungs, with a stable small left pleural effusion.      DX-CHEST-PORTABLE (1 VIEW)   Final Result         1.  Pulmonary edema and/or infiltrates are identified,  which are stable since the prior exam.   2.  Trace bilateral pleural effusions   3.  Cardiomegaly      CT-EXTREMITY, LOWER W/O RIGHT   Final Result      1.  Status post right total knee replacement.      2.  Diffuse atherosclerotic calcification of the arterial system of the right lower extremity suspicious for diabetes mellitus.      3.  Soft tissue attenuation in the subcutaneous tissues of the mid to distal lower leg and foot suspicious for cellulitis.      4.  No evidence of soft tissue ulceration in the right lower leg or foot.      5.  No evidence of acute osteomyelitis in the right lower leg or foot.      6.  Small subcutaneous abscesses cannot be excluded without the use of intravenous contrast.      DX-CHEST-PORTABLE (1 VIEW)   Final Result      Left internal jugular central venous access catheter terminates over a persistent left superior vena cava. No postprocedure visible pneumothorax.      The remainder is stable.      DX-TIBIA AND FIBULA RIGHT   Final Result      1. Right lower leg soft tissue swelling.   2. No acute fracture or subluxation.   3. Postsurgical changes of right total knee arthroplasty.      US-ABDOMEN F.A.S.T. LTD (FOR ED USE ONLY)   Final Result      No free fluid seen in all 4 quadrants.      Negative FAST scan.            CT-ABDOMEN-PELVIS WITH   Final Result      1. No acute posttraumatic findings in the abdomen or pelvis.   2. Bibasilar subsegmental atelectasis.   3. Right pelvic transplant kidney without hydronephrosis.   4. Polycystic left kidney.   5. Diverticulosis without diverticulitis. Normal appendix.      CT-CTA CHEST PULMONARY ARTERY W/ RECONS   Final Result      1.  No CT evidence of pulmonary emboli.      2.  Bibasilar atelectasis.      3.  No evidence of rib fracture.      4. Diffuse coronary artery calcification.      CT-HEAD W/O   Final Result      1.  Cerebral atrophy.      2.  Otherwise, Head CT without contrast within normal limits. No evidence of acute cerebral  infarction, hemorrhage or mass lesion.         CT-CSPINE WITHOUT PLUS RECONS   Final Result      1.  Moderate osteoarthritic changes at the C6-7 level with disc space narrowing and marginal osteophytosis. Further there is moderate cervical spondylotic changes at this level.      2.  No evidence of cervical spine fracture and/or subluxation.      DX-PELVIS-1 OR 2 VIEWS   Final Result      1.  Unremarkable single AP view of the pelvis.      DX-CHEST-LIMITED (1 VIEW)   Final Result      1.  Curvilinear perihilar opacities likely atelectasis and/or parenchymal scarring.      2.  Mild cardiomegaly.           Assessment/Plan  * Septic shock ([facility])- (present on admission)  Assessment & Plan  E.coli bacteremia  Infectious disease consulting  Antibiotics  C/o right leg cellulitis  6/2 WBC:13.9    Knee effusion, right  Assessment & Plan  S/p aspiration with unremarkable evaluation  PT/OT  Fell recently to his knees.  Bruising and blister on left knee.    Cardiac arrest ([facility])  Assessment & Plan  S/p PEA  Monitor labs, vitals.    Acute metabolic encephalopathy  Assessment & Plan  Improving.  Significant other at bedside 6/2 am states he seems more alert.  Monitor neurochecks  Treating infection as likely etiology    Immunosuppression due to chronic steroid use ([facility])  Assessment & Plan  Restart immunosuppression as per nephrology  On antibiotics for infection.    Bacteremia due to Escherichia coli- (present on admission)  Assessment & Plan  Antibiotics.  Question bladder vs bowel source.  Downtrend in WBC  Monitor vitals, labs    Acute respiratory failure with hypoxia ([facility])  Assessment & Plan  Intubated 5/26 and extubated 6/1  Mobilize as able  Respiratory protocol  Supplemental oxygen and wean as able  CXR shows slight pulmonary edema    PAF (paroxysmal atrial fibrillation) ([facility])- (present on admission)  Assessment & Plan  Monitor on telemetry  Outpatient metoprolol 25mg daily on hold due to septic shock  Reinitiate once  able.  Monitor vitals, labs    Type 2 diabetes mellitus with hyperlipidemia (HCC)- (present on admission)  Assessment & Plan  Elevated glucose 6/2  Holding outpatient glyburide, linagliptin, actos.  Monitor accuchecks and cover with SSI  On lantus 35 units BID    Thrombocytopenia (HCC)- (present on admission)  Assessment & Plan  6/2 Plt:109  Monitor CBC    Acute renal failure superimposed on stage 3a chronic kidney disease (HCC)  Assessment & Plan  Nephrology consulting  Hemodialysis as per nephrology  Monitor vitals, I/O's, labs  6/2 BUN:112, Cr:4.17  Avoid nephrotoxins.  Immunosuppression for hx of renal transplant         VTE prophylaxis: heparin ppx    I have performed a physical exam and reviewed and updated ROS and Plan today (6/3/2022). In review of yesterday's note (6/2/2022), there are no changes except as documented above.

## 2022-06-03 NOTE — DISCHARGE PLANNING
HTH/SCP TCN chart review completed. Noted patient with hospital admission 5/24/22 for septic shock, transferred out of ICU 6/2/22.  Orders present for PT/OT/SLP consults.     Collaborated with Lydia MONTENEGRO CM, prior to meeting with the patient. The most current review of medical record, knowledge of pt's PLOF and social support, LACE+ score of 80, no 6 clicks scores of mobility present were considered.      TCN met with patient, with significant other and daughter, present at beside. Introduced TCN program. Provided education regarding differences in post acute resources including IRF, SNF and HH. Discussed SCP plan benefits including [facility] and [facility] transitional care services. Patient verbalized understanding.        Appreciate provider consults present for PT/OT/ ST. Choice forms proactively obtained for IRF and given to Lydia MONTENEGRO CM. DWAINE advised they would seek PMR consult from attending physician. SNF choice also discussed and left at bedside; however, given patient previous level of independence, including working and family support, anticipate patient will likely benefit from IRF level of care. TCN will continue to follow and collaborate with discharge planning team as additional post acute needs arise. Thank you.

## 2022-06-03 NOTE — THERAPY
Speech Language Pathology   Fiberoptic Endoscopic Evaluation of Swallow     Patient Name: Jama Altman  AGE:  66 y.o., SEX:  male  Medical Record #: [id number]  Today's Date: 6/3/2022     Precautions: Fall Risk, Swallow Precautions ( See Comments), Nasogastric Tube      HPI: Pt is a 65 y/o M who was admitted 5/24/22. The pt presented with AMS after he fell at home and struck his head. He was admitted for septic shock likely r/t soft tissue infection of his right leg.     Hospital course significant for:  5/26-5/31 Intubation  5/27  CRRT initiated  5/28  PEA arrest + CPR, arhtrocentesis, daily HD    PMHx:  HTN, ADELFO, DM, renal transplant (9/10/2010) for end stage polycystic kidney disease, covid-19 (11/2020), thrombocytopenia    CXR 6/2 -  1.  Bibasilar underinflation atelectasis which could obscure an additional process. This is unchanged.  2.  Interstitial opacities likely pulmonary edema  3.  Persistently enlarged cardiac silhouette    CT-CTA Chest 5/24 -  1.  No CT evidence of pulmonary emboli.  2.  Bibasilar atelectasis.  3.  No evidence of rib fracture.  4. Diffuse coronary artery calcification.      Current Method of Nutrition   NGT/Cortrak      Pertinent Information:  Affect/Behavior: Calm, Cooperative  Oxygen Requirements: 2 L Nasal Cannula  Cortrak: in situ to R nare  Dentition: Good, Natural dentition  Factor(s) Affecting Performance: None    Discussed the risks, benefits, and alternatives of the FEES procedure. Patient/family acknowledged and agreed to proceed.     Assessment  Flexible Endoscopic Evaluation of Swallowing (FEES) completed at bedside today. SLP attempted to pass the endoscope via R nare but was unsuccessful d/t large amount of blood that limited open space. Therefore, the endoscope was passed transnasally via left nare to evaluate the anatomy and physiology of swallowing. Pt tolerated the procedure with no apparent distress.    Anatomic Findings: Mildly narrowed pharyngeal lumen, edematous  b/l arytenoids  Vocal Fold Motion: Intermittent slight glottic gap (1/3 posterior VF)  Secretion Management: WNL  Bolus Administration: SLP  PO trials: ice chips, thin liquids, mildly thick liquids, liquidized, puree, regular solids      Consistency PAS Score Timing Comments   Ice Chips 1 N/A    Thin Liquid 8 During swallow PAS 3 - Spoon  PAS 8 - Cup (single)   Mildly Thick Liquid 1 N/A Spoon, Cup, Straw   Liquidized 1 N/A    Puree 1 N/A    Regular 1 N/A      1     No contrast enters airway  2     Contrast enters the airway, remains above the vocal folds, and is ejected from the airway (not seen in the airway at the end of the swallow).  3     Contrast enters the airway, remains above the vocal folds, and is not ejected from the airway (is seen in the airway after the swallow).  4     Contrast enters the airway, contacts the vocal folds, and is ejected from the airway.  5     Contrast enters the airway, contacts the vocal folds, and is not ejected from the airway  6     Contrast enters the airway, crosses the plane of the vocal folds, and is ejected from the airway.  7     Contrast enters the airway, crosses the plane of the vocal folds, and is not ejected from the airway despite effort.  8     Contrast enters the airway, crosses the plane of the vocal folds, is not ejected from the airway and there is no response to aspiration.      Oral phase:  Appropriate oral acceptance and sufficient containment. Adequate bite and mastication of the regular solid. Complete oral clearance.      Pharyngeal phase:  Deficits marked by decreased BOT retraction, reduced LVC and impaired sensory integrity (larygneal and pharyngeal). Resulting in the followin.  Intermittent penetration pre-swallow and silent aspiration during the swallow of thin liquids. Trace diffuse pharyngeal stasis noted.  2.  Mild vallecular stasis of pudding material that cleared spontaneously. Moderate vallecular stasis of the regular solid that did not  clear despite use of compensatory strategies.     NOTE: Pt was not sensate to the vallecular stasis.       Compensatory Strategies  Cued cough - Partial clearance of the aspirate and laryngeal vestibule (thin liquids)  Effortful swallow - Ineffective (regular)  Liquid wash - Minimal clearance of vallecular stasis (regular)      Clinical Impressions  The pt presents with a mild to moderate pharyngeal dysphagia. Resulting in impaired swallow safety and efficiency. Results appear acute c/w post-extubation dysphagia and concomitant occurrence of ICU acquired weakness. Aspiration risk from oral intake appears elevated with indication for a short-term modified diet. Swallow prognosis is good with ongoing medical management and exercise-based swallow rehabilitation.      Recommendations  1.  Pureed solids (PU4), Mildly thick liquids (MT2) - defer to physician for Cortrak removal  2.  Swallowing Instructions & Precautions:   Supervision: Close supervision - patient may be left alone for less than 5 minutes at a time  Positioning: Fully upright and midline during oral intake, Remain upright for 10 minutes after oral intake  Medication: Whole with liquid, Cut large pills, As tolerated  Strategies: Small bites/sips, Alternate bites and sips, Slow rate of intake  Oral Care: Q6h   3.  SLP to follow for dysphagia management.    Results and recs d/w pt, girlfriend and family. Thank you.    Plan  Recommend Speech Therapy 4 times per week until therapy goals are met for the following treatments:  Dysphagia Training and Patient / Family / Caregiver Education.  Discharge Recommendations: Recommend post-acute placement for additional speech therapy services prior to discharge home       06/03/22 1124   Charge Group   SLP Video Swallow / FEES Video Flexible Fiberoptic Endoscopy Evaluation   Vitals   O2 (LPM) 2   O2 Delivery Device Silicone Nasal Cannula   Pain 0 - 10 Group   Therapist Pain Assessment Post Activity Pain Same as Prior to  "Activity   Prior Living Situation   Prior Services Home-Independent   Prior Level Of Function   Communication Within Functional Limits   Swallow Within Functional Limits   Dentition Intact   Dentures None   Hearing Within Functional Limits for Evaluation   Hearing Aid None   Vision Within Functional Limits for Evaluation   Patient's Primary Language English   Occupation (Pre-Hospital Vocational) Employed Full Time   Problem List   Problem List Dysphagia   Patient / Family Goals   Patient / Family Goal #1 \"water\"   Goal #1 Outcome Progressing as expected   Short Term Goals   Short Term Goal # 1 Pt will particiate in diagnostic swallow study to confirm/rule out aspiraiton and determine least restrictive diet with min cues.   Goal Outcome # 1 Goal met, new goal added   Short Term Goal # 1 B  Pt will consume PU4/MT2 without overt signs of aspiration given Elliott.   Short Term Goal # 2 Pt will complete swallow exercises targeting BoT retraction given Elliott.   Short Term Goal # 3 Pt will complete swallow exercises targeting pharyngeal constriction and LVC given Elliott.     "

## 2022-06-03 NOTE — CARE PLAN
The patient is Stable - Low risk of patient condition declining or worsening    Shift Goals  Clinical Goals: Monitor BP, wound care, speech eval, possible dialysis  Patient Goals: Comfort  Family Goals: N/A    Progress made toward(s) clinical / shift goals:      Patient is not progressing towards the following goals: FEES finished, diet advanced. Pt complaining of no pain, per Nephrologist, no dialysis today due to labs. Family at bedside.       Problem: Knowledge Deficit - Standard  Goal: Patient and family/care givers will demonstrate understanding of plan of care, disease process/condition, diagnostic tests and medications  Outcome: Not Progressing     Problem: Skin Integrity  Goal: Skin integrity is maintained or improved  Outcome: Not Progressing

## 2022-06-03 NOTE — PROGRESS NOTES
4 Eyes Skin Assessment Completed by LNA Miller and LAN Jacob.    Head Scab, Bruising and Swelling  Ears Redness and Non-Blanching left ear  Nose WDL  Mouth Bleeding  Neck Bruising  Breast/Chest Bruising  Shoulder Blades WDL  Spine WDL  (R) Arm/Elbow/Hand Non-Blanching and Bruising  (L) Arm/Elbow/Hand Bruising  Abdomen Bruising  Groin WDL  Scrotum/Coccyx/Buttocks Redness and Non-Blanching  (R) Leg Bruising, Weeping and Edema  (L) Leg WDL  (R) Heel/Foot/Toe WDL   (L) Heel/Foot/Toe Bruising          Devices In Places Blood Pressure Cuff, SCD's, Cortrak, Central Line, BMS, Condom Cath and Nasal Cannula      Interventions In Place Pillows, Q2 Turns, Barrier Cream, Dri-Kaleb Pads and Pressure Redistribution Mattress    Possible Skin Injury Yes    Pictures Uploaded Into Epic No, needs to be completed  Wound Consult Placed Yes  RN Wound Prevention Protocol Ordered Yes     Wound to right calf anterior and posterior- dressing in place, blister and bruising to left anterior knee. Bruising to medial left ankle, sacrum red and non blanching. Left ear red and non blanching.

## 2022-06-03 NOTE — PROGRESS NOTES
Assumed care of patient at 0700 from Jama MONTENEGRO. Patient is A&Ox 4, states pain level is 0/10. Bed locked in lowest position with 2 rails up. Call light in place, belongings at bedside. Patient expresses zero needs at this time and hourly rounding is in place. Bed alarm in on for high fall risk.

## 2022-06-03 NOTE — THERAPY
"Occupational Therapy   Initial Evaluation     Patient Name: Jama Altman  Age:  66 y.o., Sex:  male  Medical Record #: [id number]  Today's Date: 6/3/2022     Precautions: Fall Risk, Nasogastric Tube, Swallow Precautions ( See Comments)    Assessment    Patient is 66 y.o. male with h/o a-fib, renal transplant, HTN, DMII, admitted following GLF with AMS. Pt dx with septic shock, acute metabolic encephalopathy, acute respiratory failure, cardiac arrest, R knee effusion (s/p aspiration with negative cultures). Pt seen for OT eval. SO \"Linda\" and friend/ \"Faraz\" present and very supportive. See grid below for details. Pt required max A to mobilize to/from EOB, but was able to maintain sitting balance with CGA. Unable to stand or transfer to chair. C/o pain in RLE with WB. Pt had difficulty sequencing to not push through R foot while scooting laterally. Pt is limited by: generalized weakness, balance impairment, pain, motor incoordination, mildly decreased cognition. Will benefit from ongoing acute OT to maximize functional independence and safety.     Plan    Recommend Occupational Therapy 3 times per week until therapy goals are met for the following treatments:  Adaptive Equipment, Neuro Re-Education / Balance, Self Care/Activities of Daily Living, Therapeutic Activities, and Therapeutic Exercises.    DC Equipment Recommendations: Unable to determine at this time  Discharge Recommendations: Recommend post-acute placement for additional occupational therapy services prior to discharge home     Subjective    \"I want to walk again.\"      Objective       06/03/22 1628   Prior Living Situation   Prior Services None;Home-Independent   Housing / Facility 1 Story House   Steps Into Home 1   Bathroom Set up Walk In Shower  (built-in seat)   Equipment Owned None   Comments Pt lives alone. SO works seasonally and stays at pt's house intermittently. Pt's  \"Faraz\" reports pt could potentially stay " with him on DC (single level home) to provide assist.   Prior Level of ADL Function   Comments Pt was independent with BADL PTA   Prior Level of IADL Function   Comments Pt was independent with I-ADL and functional mobility PTA   Cognition    Level of Consciousness Alert   Comments A+Ox4; somewhat delayed processing, difficulty following compensatory cues; flat affect, but very agreeable   Active ROM Upper Body   Dominant Hand Ambidextrous   Strength Upper Body   Comments Mild weakness L compared to R (4-/5); pt is ambidextrous so does not seem related to hand dominance   Coordination Upper Body   Comments Mild-moderate incoordination B hands; likely due to non-use   Balance Assessment   Sitting Balance (Static) Fair -   Sitting Balance (Dynamic) Poor +   Weight Shift Sitting Poor   Comments unable to stand   Bed Mobility    Supine to Sit Maximal Assist   Sit to Supine Maximal Assist   Scooting Maximal Assist  (seated, lateral scoots)   Comments HOB fully elevated, used rail   ADL Assessment   Grooming Moderate Assist;Seated  (suction oral care)   Lower Body Dressing Total Assist  (sock donning/doffing)   Toileting Total Assist  (Epps, BMS)   Functional Mobility   Sit to Stand Unable to Participate   Bed, Chair, Wheelchair Transfer Unable to Participate   Mobility Supine > < EOB, sitting balance   Short Term Goals   Short Term Goal # 1 Pt will complete ADL transfers with mod A   Short Term Goal # 2 Pt will complete gown change with supv   Short Term Goal # 3 Pt will complete seated grooming with set-up

## 2022-06-03 NOTE — CARE PLAN
The patient is Stable - Low risk of patient condition declining or worsening    Shift Goals  Clinical Goals: monitor BS  Patient Goals: pain control  Family Goals: n/a    Progress made toward(s) clinical / shift goals:  Patient was educated on preventing any skin injuries  Problem: Knowledge Deficit - Standard  Goal: Patient and family/care givers will demonstrate understanding of plan of care, disease process/condition, diagnostic tests and medications  Outcome: Progressing     Problem: Pain - Standard  Goal: Alleviation of pain or a reduction in pain to the patient’s comfort goal  Outcome: Progressing     Problem: Skin Integrity  Goal: Skin integrity is maintained or improved  Outcome: Progressing     Problem: Fall Risk  Goal: Patient will remain free from falls  Outcome: Progressing     Problem: Safety - Medical Restraint  Goal: Remains free of injury from restraints (Restraint for Interference with Medical Device)  Outcome: Progressing  Goal: Free from restraint(s) (Restraint for Interference with Medical Device)  Outcome: Progressing       Patient is not progressing towards the following goals:

## 2022-06-03 NOTE — PROGRESS NOTES
"[facility] -  PROGRESS NOTE               Author: Live Hirsch M.D. Date & Time: 6/3/2022  7:34 AM     HPI:  Patient is a 66 year old male with a PMHx of renal transplant 9/10/2010 for polycystic kidney disease, DM, thrombocytopenia, CKD3b, pafib, covid19 infection, who presents for AMS.  He was brought in activated trauma for fall, CT imaging has been negative, but was in severe septic shock started on pressors and give nfluid boluses.  On broad spectrum abx.  Currently complains of right leg pain but thinks its better.  Confirms his last dose of tacrolimus and cellcept was yesterday.  Currently on levophed    DAILY NEPHROLOGY SUMMARY:  5/24: Consult done  5/25: NAEO, no complaints, feels a bit better, family at bedside  5/26: Decompensated overnight, intubated due to resp. Distress and pressors initiated  5/27: NAEO, no complaints, hemodynamics decompensated and CRRT initiated instead, tolerated well overnight, still on it this AM  5/28: transitioned to daily iHD for now, +13.8L for hospitalization, remains intubated, on pressor support, Tmax 101.1F, WBC 28.9  5/29: tolerated HD, UF 3L 5/28, remains intubated and on pressor support  5/30: Tolerated UF, improved hemodynamics, remains on pressors, remains on mechanical ventilation   5/31: Seen on Dialysis, vasc cath sluggish despite TPA, only able to run   6/1: Tolerated HD, extubated, UOP increasing-2.7L yesterday, net neg 5L  6/2: Intermittent encephalopathy per wife,  2.5L UOP yesterday, soft Bps, feeling stronger today  6/3: low Bps, brisk UOP-2.6L, with minimal PO intake, thirsty, NO UF with HD, labs pending    REVIEW OF SYSTEMS:    Limited due to pt communication     PMH/PSH/SH/FH:   Reviewed and unchanged since admission note    CURRENT MEDICATIONS:   Reviewed from admission to present day    VS:  VS:  BP (!) 100/99   Pulse 96   Temp 36.6 °C (97.8 °F) (Temporal)   Resp 18   Ht 1.956 m (6' 5\")   Wt 122 kg (268 lb 15.4 " oz)   SpO2 99%   BMI 31.89 kg/m²   GENERAL: Ill appearing  CV: no pedal edema  RESP:non-labored  GI: Soft  MSK: No joint deformities   SKIN: ecchymoses  NEURO: No tremor  PSYCH: Deferred    Fluids:  In: 1065 [P.O.:125; Dialysis:700; Enteral:60]  Out: 3700     LABS:  Recent Labs     06/01/22  0425 06/02/22  0518   SODIUM 136 139   POTASSIUM 4.1 3.7   CHLORIDE 97 94*   CO2 25 27   GLUCOSE 146* 336*   BUN 81* 112*   CREATININE 3.52* 4.17*   CALCIUM 7.8* 7.3*     IMAGING:   All imaging reviewed from admission to present day    IMPRESSION:  # SANKET  - Etiology likely 2/2 ATN  - has received contrast  - RRT dependent since 5/27, now with signs of recovery  # DDKT  - 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 Coli bacteremia/septic shock  - Source unclear, ?colitis on imaging  - Abx on board  - pressor support  # Hyperkalemia, improved  - No ECG changes  # Encephalopathy  - Etiology likely 2/2 hypoperfusion/sepsis  # Acute respiratory failure  - Intubated 5/26  # RLE ulcer  - Trauma  # type 2 DM  # thrombocytopenia  - worsening    PLAN:  -Tentative plan to hold HD today unless labs necessitate  -1L IV fluids today  -Suggish vasc cath (only able to run at ~250BFR despite TPA/repeated interventions) but trying to avoid exchanging given some e/o recovery. Using vasc cath through the weekened and if still requiring HD next week will need conversion to permacath  -Holding lasix  -tacrolimus 1mg BID, restart MMF  -Daily labs and weights  -renal formula tube feeds  -Monitor I/O  -Dose all meds per eGFR < 15    Thank you

## 2022-06-04 LAB
ALBUMIN SERPL BCP-MCNC: 2.4 G/DL (ref 3.2–4.9)
ALBUMIN/GLOB SERPL: 0.7 G/DL
ALP SERPL-CCNC: 134 U/L (ref 30–99)
ALT SERPL-CCNC: 35 U/L (ref 2–50)
ANION GAP SERPL CALC-SCNC: 15 MMOL/L (ref 7–16)
AST SERPL-CCNC: 29 U/L (ref 12–45)
BILIRUB SERPL-MCNC: 0.4 MG/DL (ref 0.1–1.5)
BUN SERPL-MCNC: 103 MG/DL (ref 8–22)
CALCIUM SERPL-MCNC: 7.9 MG/DL (ref 8.5–10.5)
CHLORIDE SERPL-SCNC: 102 MMOL/L (ref 96–112)
CO2 SERPL-SCNC: 26 MMOL/L (ref 20–33)
CREAT SERPL-MCNC: 2.87 MG/DL (ref 0.5–1.4)
GFR SERPLBLD CREATININE-BSD FMLA CKD-EPI: 23 ML/MIN/1.73 M 2
GLOBULIN SER CALC-MCNC: 3.3 G/DL (ref 1.9–3.5)
GLUCOSE BLD STRIP.AUTO-MCNC: 141 MG/DL (ref 65–99)
GLUCOSE BLD STRIP.AUTO-MCNC: 154 MG/DL (ref 65–99)
GLUCOSE BLD STRIP.AUTO-MCNC: 177 MG/DL (ref 65–99)
GLUCOSE BLD STRIP.AUTO-MCNC: 68 MG/DL (ref 65–99)
GLUCOSE BLD STRIP.AUTO-MCNC: 81 MG/DL (ref 65–99)
GLUCOSE SERPL-MCNC: 208 MG/DL (ref 65–99)
POTASSIUM SERPL-SCNC: 3.8 MMOL/L (ref 3.6–5.5)
PROT SERPL-MCNC: 5.7 G/DL (ref 6–8.2)
SODIUM SERPL-SCNC: 143 MMOL/L (ref 135–145)

## 2022-06-04 PROCEDURE — 80053 COMPREHEN METABOLIC PANEL: CPT

## 2022-06-04 PROCEDURE — 36415 COLL VENOUS BLD VENIPUNCTURE: CPT

## 2022-06-04 PROCEDURE — 770001 HCHG ROOM/CARE - MED/SURG/GYN PRIV*

## 2022-06-04 PROCEDURE — 700111 HCHG RX REV CODE 636 W/ 250 OVERRIDE (IP): Performed by: INTERNAL MEDICINE

## 2022-06-04 PROCEDURE — 700105 HCHG RX REV CODE 258: Performed by: HOSPITALIST

## 2022-06-04 PROCEDURE — 700102 HCHG RX REV CODE 250 W/ 637 OVERRIDE(OP): Performed by: HOSPITALIST

## 2022-06-04 PROCEDURE — 700111 HCHG RX REV CODE 636 W/ 250 OVERRIDE (IP): Performed by: HOSPITALIST

## 2022-06-04 PROCEDURE — 99232 SBSQ HOSP IP/OBS MODERATE 35: CPT | Performed by: HOSPITALIST

## 2022-06-04 PROCEDURE — A9270 NON-COVERED ITEM OR SERVICE: HCPCS | Performed by: INTERNAL MEDICINE

## 2022-06-04 PROCEDURE — 82962 GLUCOSE BLOOD TEST: CPT | Mod: 91

## 2022-06-04 PROCEDURE — A9270 NON-COVERED ITEM OR SERVICE: HCPCS | Performed by: HOSPITALIST

## 2022-06-04 PROCEDURE — 700102 HCHG RX REV CODE 250 W/ 637 OVERRIDE(OP): Performed by: INTERNAL MEDICINE

## 2022-06-04 PROCEDURE — 700111 HCHG RX REV CODE 636 W/ 250 OVERRIDE (IP): Performed by: STUDENT IN AN ORGANIZED HEALTH CARE EDUCATION/TRAINING PROGRAM

## 2022-06-04 RX ORDER — OXYCODONE HYDROCHLORIDE 5 MG/1
5 TABLET ORAL EVERY 4 HOURS PRN
Status: DISCONTINUED | OUTPATIENT
Start: 2022-06-04 | End: 2022-06-22 | Stop reason: HOSPADM

## 2022-06-04 RX ORDER — MYCOPHENOLATE MOFETIL 250 MG/1
500 CAPSULE ORAL 2 TIMES DAILY
Status: DISCONTINUED | OUTPATIENT
Start: 2022-06-04 | End: 2022-06-22 | Stop reason: HOSPADM

## 2022-06-04 RX ORDER — PREDNISONE 5 MG/1
5 TABLET ORAL DAILY
Status: DISCONTINUED | OUTPATIENT
Start: 2022-06-05 | End: 2022-06-22 | Stop reason: HOSPADM

## 2022-06-04 RX ORDER — POLYETHYLENE GLYCOL 3350 17 G/17G
1 POWDER, FOR SOLUTION ORAL
Status: DISCONTINUED | OUTPATIENT
Start: 2022-06-04 | End: 2022-06-16

## 2022-06-04 RX ORDER — BISACODYL 10 MG
10 SUPPOSITORY, RECTAL RECTAL
Status: DISCONTINUED | OUTPATIENT
Start: 2022-06-04 | End: 2022-06-16

## 2022-06-04 RX ORDER — TACROLIMUS 1 MG/1
1 CAPSULE ORAL 2 TIMES DAILY
Status: DISCONTINUED | OUTPATIENT
Start: 2022-06-04 | End: 2022-06-22 | Stop reason: HOSPADM

## 2022-06-04 RX ORDER — MIDODRINE HYDROCHLORIDE 5 MG/1
5 TABLET ORAL
Status: DISCONTINUED | OUTPATIENT
Start: 2022-06-04 | End: 2022-06-08

## 2022-06-04 RX ORDER — AMOXICILLIN 250 MG
2 CAPSULE ORAL 2 TIMES DAILY
Status: DISCONTINUED | OUTPATIENT
Start: 2022-06-04 | End: 2022-06-16

## 2022-06-04 RX ORDER — ACETAMINOPHEN 325 MG/1
650 TABLET ORAL EVERY 6 HOURS PRN
Status: DISCONTINUED | OUTPATIENT
Start: 2022-06-04 | End: 2022-06-21

## 2022-06-04 RX ADMIN — MEROPENEM 500 MG: 500 INJECTION, POWDER, FOR SOLUTION INTRAVENOUS at 16:35

## 2022-06-04 RX ADMIN — DAKIN'S SOLUTION 0.125% (QUARTER STRENGTH) 473 ML: 0.12 SOLUTION at 22:26

## 2022-06-04 RX ADMIN — INSULIN HUMAN 3 UNITS: 100 INJECTION, SOLUTION PARENTERAL at 12:47

## 2022-06-04 RX ADMIN — MYCOPHENOLATE MOFETIL 500 MG: 250 CAPSULE ORAL at 18:09

## 2022-06-04 RX ADMIN — MIDODRINE HYDROCHLORIDE 5 MG: 5 TABLET ORAL at 12:49

## 2022-06-04 RX ADMIN — MYCOPHENOLATE MOFETIL 500 MG: 250 CAPSULE ORAL at 09:13

## 2022-06-04 RX ADMIN — HEPARIN SODIUM 5000 UNITS: 5000 INJECTION, SOLUTION INTRAVENOUS; SUBCUTANEOUS at 05:05

## 2022-06-04 RX ADMIN — TACROLIMUS 1 MG: 1 CAPSULE ORAL at 18:09

## 2022-06-04 RX ADMIN — HEPARIN SODIUM 5000 UNITS: 5000 INJECTION, SOLUTION INTRAVENOUS; SUBCUTANEOUS at 18:09

## 2022-06-04 RX ADMIN — TACROLIMUS 1 MG: 5 CAPSULE ORAL at 05:05

## 2022-06-04 RX ADMIN — MICONAZOLE NITRATE: 20 CREAM TOPICAL at 05:07

## 2022-06-04 RX ADMIN — MIDODRINE HYDROCHLORIDE 5 MG: 5 TABLET ORAL at 18:09

## 2022-06-04 RX ADMIN — PREDNISONE 5 MG: 5 TABLET ORAL at 05:04

## 2022-06-04 RX ADMIN — MEROPENEM 500 MG: 500 INJECTION, POWDER, FOR SOLUTION INTRAVENOUS at 23:22

## 2022-06-04 RX ADMIN — INSULIN GLARGINE-YFGN 35 UNITS: 100 INJECTION, SOLUTION SUBCUTANEOUS at 05:19

## 2022-06-04 ASSESSMENT — PAIN DESCRIPTION - PAIN TYPE
TYPE: ACUTE PAIN

## 2022-06-04 ASSESSMENT — FIBROSIS 4 INDEX
FIB4 SCORE: 2.09
FIB4 SCORE: 2.09

## 2022-06-04 NOTE — PROGRESS NOTES
[facility] -  PROGRESS NOTE               Author: Roderick Ramirez M.D. Date & Time: 6/4/2022  11:13 AM     HPI:  Patient is a 66 year old male with a PMHx of renal transplant 9/10/2010 for polycystic kidney disease, DM, thrombocytopenia, CKD3b, pafib, covid19 infection, who presents for AMS.  He was brought in activated trauma for fall, CT imaging has been negative, but was in severe septic shock started on pressors and give nfluid boluses.  On broad spectrum abx.  Currently complains of right leg pain but thinks its better.  Confirms his last dose of tacrolimus and cellcept was yesterday.  Currently on levophed    DAILY NEPHROLOGY SUMMARY:  5/24: Consult done  5/25: NAEO, no complaints, feels a bit better, family at bedside  5/26: Decompensated overnight, intubated due to resp. Distress and pressors initiated  5/27: NAEO, no complaints, hemodynamics decompensated and CRRT initiated instead, tolerated well overnight, still on it this AM  5/28: transitioned to daily iHD for now, +13.8L for hospitalization, remains intubated, on pressor support, Tmax 101.1F, WBC 28.9  5/29: tolerated HD, UF 3L 5/28, remains intubated and on pressor support  5/30: Tolerated UF, improved hemodynamics, remains on pressors, remains on mechanical ventilation   5/31: Seen on Dialysis, vasc cath sluggish despite TPA, only able to run   6/1: Tolerated HD, extubated, UOP increasing-2.7L yesterday, net neg 5L  6/2: Intermittent encephalopathy per wife,  2.5L UOP yesterday, soft Bps, feeling stronger today  6/3: low Bps, brisk UOP-2.6L, with minimal PO intake, thirsty, NO UF with HD, labs pending  6/4: Denies pain or SOB.  UOP 1.5L  No new labs    REVIEW OF SYSTEMS:    Limited due to pt communication     PMH/PSH/SH/FH:   Reviewed and unchanged since admission note    CURRENT MEDICATIONS:   Reviewed from admission to present day    VS:  VS:  /67   Pulse 90   Temp 36.2 °C (97.1 °F) (Temporal)   Resp 18   Ht  "1.956 m (6' 5\")   Wt 122 kg (268 lb 11.9 oz)   SpO2 99%   BMI 31.87 kg/m²   GENERAL: Ill appearing  CV: no pedal edema  RESP:non-labored  GI: Soft  MSK: No joint deformities   SKIN: ecchymoses  NEURO: No tremor  PSYCH: Deferred    Fluids:  In: 430 [P.O.:240; Enteral:190]  Out: 1500     LABS:  Recent Labs     06/02/22  0518 06/03/22  0942   SODIUM 139 144   POTASSIUM 3.7 3.8   CHLORIDE 94* 102   CO2 27 27   GLUCOSE 336* 124*   * 101*   CREATININE 4.17* 3.23*   CALCIUM 7.3* 7.7*     IMAGING:   All imaging reviewed from admission to present day    IMPRESSION:  # SANKET  - Etiology likely 2/2 ATN  - has received contrast  - RRT dependent since 5/27, now with signs of recovery  # DDKT  - 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 Coli bacteremia/septic shock  - Source unclear, ?colitis on imaging  - Abx on board  - pressor support  # Hyperkalemia, improved  - No ECG changes  # Encephalopathy  - Etiology likely 2/2 hypoperfusion/sepsis  # Acute respiratory failure  - Intubated 5/26  # RLE ulcer  - Trauma  # type 2 DM  # thrombocytopenia  - worsening    PLAN:  -Tentative plan to hold HD today unless labs necessitate  -1L IV fluids today  -Suggish vasc cath (only able to run at ~250BFR despite TPA/repeated interventions) but trying to avoid exchanging given some e/o recovery. Using vasc cath through the weekened and if still requiring HD next week will need conversion to permacath  -Holding lasix  -tacrolimus 1mg BID, MMF  -Daily labs and weights  -renal formula tube feeds  -Monitor I/O  -Dose all meds per eGFR < 15    Thank you    "

## 2022-06-04 NOTE — PROGRESS NOTES
Report received from day shift RN [id number]. Assumed care of patient at [facility], patient is A&O x4. Patient is a high Fall risk, bed locked and in lowest position, bed alarm on. Patient reports pain 6/10; pt denies med for pain and would like to rest. Plan of care reviewed with patient, will implement and continue to monitor. Hourly rounding is in place and call light/belongings within reach.

## 2022-06-04 NOTE — PROGRESS NOTES
Assumed care of patient at 0700 from Noe RN. Patient is A&Ox 4, states pain level is 8/10, denies intervention at this time. Bed locked in lowest position with 2 rails up. Call light in place, belongings at bedside. Patient expresses zero needs at this time and hourly rounding is in place. Bed alarm on for high fall risk, family member at bedside.

## 2022-06-04 NOTE — CARE PLAN
The patient is Stable - Low risk of patient condition declining or worsening    Shift Goals  Clinical Goals: Wound dressing change, increase P.O intake  Patient Goals: Comfort, remove NG  Family Goals: Eat food, drink water    Progress made toward(s) clinical / shift goals: Pt tolerating P.O intake this morning, removed NG per MD okay. Pt complaining of increase in gas in bowel, RN assessed bowels hypoactive all 4 quadrants.   Applied barrier cream to sacrum, redness and excoriation present. Good urine output.     Patient is not progressing towards the following goals:       Problem: Mobility  Goal: Patient's capacity to carry out activities will improve  Outcome: Not Progressing

## 2022-06-04 NOTE — CARE PLAN
The patient is Stable - Low risk of patient condition declining or worsening    Shift Goals  Clinical Goals: monitor BP, wound care, rectal tube and cortrak care  Patient Goals: comfort  Family Goals: NA    Progress made toward(s) clinical / shift goals:  pt educated on poc. Pt resting. Fall precautions in place.       Problem: Knowledge Deficit - Standard  Goal: Patient and family/care givers will demonstrate understanding of plan of care, disease process/condition, diagnostic tests and medications  Outcome: Progressing     Problem: Pain - Standard  Goal: Alleviation of pain or a reduction in pain to the patient’s comfort goal  Outcome: Progressing     Problem: Fall Risk  Goal: Patient will remain free from falls  Outcome: Progressing       Patient is not progressing towards the following goals:

## 2022-06-04 NOTE — PROGRESS NOTES
Hospital Medicine Daily Progress Note    Date of Service  6/4/2022    Chief Complaint  Jama Altman is a 66 y.o. male admitted 5/24/2022 with     Hospital Course  No notes on file    Interval Problem Update  No acute overnight event, patient tolerating fair amount of oral intake with diet cleared by speech yesterday.  Okay to remove nasogastric tube today.    Consultants/Specialty  Infectious disease  Critical care medicine  Nephrology    Code Status  Full Code    Disposition  Pending at this time, patient may benefit from a physical medicine and rehabilitation consult once physical and Occupational Therapy evaluations are in the chart.    Review of Systems  All systems reviewed and negative except as noted per above.    Physical Exam  Temp:  [36.1 °C (97 °F)-36.4 °C (97.6 °F)] 36.2 °C (97.1 °F)  Pulse:  [65-92] 90  Resp:  [17-18] 18  BP: ()/(63-71) 115/67  SpO2:  [97 %-99 %] 99 %    General appearance: NAD, conversant, family at bedside  Eyes: anicteric sclerae, moist conjunctivae; no lid-lag; PERRLA  HENT: Atraumatic; oropharynx clear with moist mucous membranes and no mucosal ulcerations; normal hard and soft palate  Neck: Trachea midline; FROM, supple, no thyromegaly or lymphadenopathy  Lungs: CTA, with normal respiratory effort and no intercostal retractions  CV: RRR, no MRGs  Abdomen: Soft, non-tender; no masses or HSM  Extremities: No peripheral edema or extremity lymphadenopathy  Skin: Normal temperature, turgor and texture; no rash, ulcers or subcutaneous nodules  Psych: Appropriate affect,       Current Facility-Administered Medications:   •  acetaminophen (Tylenol) tablet 650 mg, 650 mg, Oral, Q6HRS PRN, Mehrdad Laughlin M.D.  •  tacrolimus (PROGRAF) capsule 1 mg, 1 mg, Oral, BID, Mehrdad Laughlin M.D.  •  senna-docusate (PERICOLACE or SENOKOT S) 8.6-50 MG per tablet 2 Tablet, 2 Tablet, Oral, BID **AND** polyethylene glycol/lytes (MIRALAX) PACKET 1 Packet, 1 Packet, Oral, QDAY PRN **AND**  magnesium hydroxide (MILK OF MAGNESIA) suspension 30 mL, 30 mL, Oral, QDAY PRN **AND** bisacodyl (DULCOLAX) suppository 10 mg, 10 mg, Rectal, QDAY PRN, Mehrdad Laughlin M.D.  •  mycophenolate (CELLCEPT) capsule 500 mg, 500 mg, Oral, BID, Mehrdad Laughlin M.D., 500 mg at 06/04/22 0913  •  midodrine (PROAMATINE) tablet 5 mg, 5 mg, Oral, TID WITH MEALS, Mehrdad Laughlin M.D., 5 mg at 06/04/22 1249  •  oxyCODONE immediate-release (ROXICODONE) tablet 5 mg, 5 mg, Oral, Q4HRS PRN, Mehrdad Laughlin M.D.  •  [START ON 6/5/2022] predniSONE (DELTASONE) tablet 5 mg, 5 mg, Oral, DAILY, Mehrdad Laughlin M.D.  •  meropenem (Merrem) 500 mg in  mL IV-MBP, 500 mg, Intravenous, Q8HRS, Mehrdad Laughlin M.D.  •  heparin injection 5,000 Units, 5,000 Units, Subcutaneous, Q12HRS, James Titus M.D., 5,000 Units at 06/04/22 0505  •  insulin GLARGINE (Lantus,Semglee) injection, 35 Units, Subcutaneous, BID, James Titus M.D., 35 Units at 06/04/22 0519  •  insulin regular (HumuLIN R,NovoLIN R) injection, 3-14 Units, Subcutaneous, Q6HRS, 3 Units at 06/04/22 1247 **AND** POC blood glucose manual result, , , Q6H **AND** NOTIFY MD and PharmD, , , Once **AND** Administer 20 grams of glucose (approximately 8 ounces of fruit juice) every 15 minutes PRN FSBG less than 70 mg/dL, , , PRN **AND** dextrose 50% (D50W) injection 25 g, 25 g, Intravenous, Q15 MIN PRN, James Titus M.D.  •  miconazole 2%-zinc oxide (Mabel) topical cream, , Topical, BID, Jeremy M Gonda, M.D., Given at 06/04/22 0507  •  artificial tears (EYE LUBRICANT) ophth ointment 1 Application, 1 Application, Both Eyes, Q8HRS, Reuben Swartz M.D., 1 Application at 06/03/22 0515  •  heparin injection 2,800 Units, 2,800 Units, Intracatheter, DIALYSIS PRN, James Sheppard M.D., 2,800 Units at 06/02/22 1400  •  Respiratory Therapy Consult, , Nebulization, Continuous RT, Reuben Swartz M.D.  •  sodium chloride (OCEAN) 0.65 % nasal spray 2 Spray, 2 Spray, Nasal, Q2HRS PRN, Kayy Hopkins,  M.D.  •  dakins 0.125% (1/4 strength) topical soln, , Topical, BID, Reuben Swartz M.D., 473 mL at 06/03/22 2000  •  HYDROmorphone (Dilaudid) injection 0.5 mg, 0.5 mg, Intravenous, Q2HRS PRN, Tracey Whitfield M.D., 0.5 mg at 05/31/22 0059      Fluids    Intake/Output Summary (Last 24 hours) at 6/4/2022 1519  Last data filed at 6/4/2022 1143  Gross per 24 hour   Intake 680 ml   Output 2800 ml   Net -2120 ml       Laboratory  Recent Labs     06/02/22  0518 06/03/22  0942   WBC 13.9* 10.9*   RBC 4.55* 5.00   HEMOGLOBIN 12.9* 14.5   HEMATOCRIT 40.4* 45.6   MCV 88.8 91.2   MCH 28.4 29.0   MCHC 31.9* 31.8*   RDW 49.3 50.2*   PLATELETCT 109* 126*   MPV 13.3* 12.1     Recent Labs     06/02/22  0518 06/03/22  0942 06/04/22  1133   SODIUM 139 144 143   POTASSIUM 3.7 3.8 3.8   CHLORIDE 94* 102 102   CO2 27 27 26   GLUCOSE 336* 124* 208*   * 101* 103*   CREATININE 4.17* 3.23* 2.87*   CALCIUM 7.3* 7.7* 7.9*                   Imaging  DX-ABDOMEN FOR TUBE PLACEMENT   Final Result      Enteric feeding tube terminates with the tip projecting over the expected location of the 2nd-3rd portion of the duodenum.      DX-CHEST-LIMITED (1 VIEW)   Final Result      1.  Bibasilar underinflation atelectasis which could obscure an additional process. This is unchanged.   2.  Interstitial opacities likely pulmonary edema   3.  Persistently enlarged cardiac silhouette      DX-CHEST-LIMITED (1 VIEW)   Final Result      1.  Hypoinflation with mildly increased left basilar atelectasis.   2.  Removal of endotracheal tube.      DX-CHEST-LIMITED (1 VIEW)   Final Result         No significant interval change.      DX-CHEST-LIMITED (1 VIEW)   Final Result      Stable areas of patchy atelectasis/consolidation.      DX-CHEST-PORTABLE (1 VIEW)   Final Result      Stable chest x-ray findings.      DX-CHEST-PORTABLE (1 VIEW)   Final Result         1.  Pulmonary edema and/or infiltrates, somewhat increased particularly in the right lung base  compared to prior study.   2.  Cardiomegaly      CT-ABDOMEN-PELVIS W/O   Final Result         Limited noncontrast exam      1. Long segment wall thickening from the descending colon to the sigmoid colon could relate to underdistention or colitis. Air-fluid level in the more proximal colon is likely diarrheal disease.      2. Right lower quadrant transplant kidney with retained prior contrast, likely secondary to renal failure/ATN/contrast nephropathy. No hydronephrosis.      Absent right kidney. Polycystic left kidney.      CT-EXTREMITY, LOWER W/O RIGHT   Final Result      1. Postsurgical changes of right total knee arthroplasty.   2. Right knee joint effusion with thickening of the joint capsule and surrounding soft tissue edema.   3. Circumferential edema involving the soft tissues of the right lower leg with an anterior soft tissue defect and with areas of clustering on the medial right lower leg skin surface.   4. Arteriosclerosis.      DX-CHEST-PORTABLE (1 VIEW)   Final Result      1.  Unchanged position of supporting devices are visualized extent   2.  No visible pneumothorax following chest compressions   3.  Unchanged atelectasis and possible superimposed interstitial pulmonary edema or atypical pneumonia      US-EXTREMITY ARTERY LOWER UNILAT RIGHT   Final Result      US-MIRELLA SINGLE LEVEL BILAT   Final Result      US-EXTREMITY ARTERY LOWER BILAT W/MIRELLA (COMBO)   Final Result      DX-CHEST-PORTABLE (1 VIEW)   Final Result         1.  Pulmonary edema and/or infiltrates, somewhat increased particularly in the right lung base compared to prior study.   2.  Cardiomegaly      DX-ABDOMEN FOR TUBE PLACEMENT   Final Result      1. The tip of the feeding tube terminates over the junction of the gastric pylorus and duodenal bulb.   2. The remainder is stable.      DX-CHEST-PORTABLE (1 VIEW)   Final Result      1. Interval decrease in size of the right pleural effusion.   2. No left pleural effusion.   3. No visible  pneumothorax status post bronchoscopy.   4. Interval placement of a Dobbhoff feeding tube.   5. Improving parenchymal loss in the right lung base, incompletely resolved.   6. The remainder is stable.      DX-CHEST-PORTABLE (1 VIEW)   Final Result      1. Interval development of a moderate right pleural effusion after placement of a left internal jugular dialysis catheter, abutting the right lateral wall of the right atrium. Verification of line position with contrast injection under fluoroscopy is    offered.   2. Improved perihilar atelectasis.   3. The remainder is stable.      I, Dr. Matthew Brown, discussed the results of this examination directly by phone with Dr. Reuben Swartz on 5/26/2022 at 0855 hours.      EC-ECHOCARDIOGRAM COMPLETE W/ CONT   Final Result      DX-CHEST-LIMITED (1 VIEW)   Final Result         1. Right internal jugular central venous access catheter placed in the interval terminates over the upper aspect of the right atrium. No postprocedure visible pneumothorax.   2. Stable patchy parenchymal opacities in the lungs, with a stable small left pleural effusion.      DX-CHEST-PORTABLE (1 VIEW)   Final Result         1.  Pulmonary edema and/or infiltrates are identified, which are stable since the prior exam.   2.  Trace bilateral pleural effusions   3.  Cardiomegaly      CT-EXTREMITY, LOWER W/O RIGHT   Final Result      1.  Status post right total knee replacement.      2.  Diffuse atherosclerotic calcification of the arterial system of the right lower extremity suspicious for diabetes mellitus.      3.  Soft tissue attenuation in the subcutaneous tissues of the mid to distal lower leg and foot suspicious for cellulitis.      4.  No evidence of soft tissue ulceration in the right lower leg or foot.      5.  No evidence of acute osteomyelitis in the right lower leg or foot.      6.  Small subcutaneous abscesses cannot be excluded without the use of intravenous contrast.       DX-CHEST-PORTABLE (1 VIEW)   Final Result      Left internal jugular central venous access catheter terminates over a persistent left superior vena cava. No postprocedure visible pneumothorax.      The remainder is stable.      DX-TIBIA AND FIBULA RIGHT   Final Result      1. Right lower leg soft tissue swelling.   2. No acute fracture or subluxation.   3. Postsurgical changes of right total knee arthroplasty.      US-ABDOMEN F.A.S.T. LTD (FOR ED USE ONLY)   Final Result      No free fluid seen in all 4 quadrants.      Negative FAST scan.            CT-ABDOMEN-PELVIS WITH   Final Result      1. No acute posttraumatic findings in the abdomen or pelvis.   2. Bibasilar subsegmental atelectasis.   3. Right pelvic transplant kidney without hydronephrosis.   4. Polycystic left kidney.   5. Diverticulosis without diverticulitis. Normal appendix.      CT-CTA CHEST PULMONARY ARTERY W/ RECONS   Final Result      1.  No CT evidence of pulmonary emboli.      2.  Bibasilar atelectasis.      3.  No evidence of rib fracture.      4. Diffuse coronary artery calcification.      CT-HEAD W/O   Final Result      1.  Cerebral atrophy.      2.  Otherwise, Head CT without contrast within normal limits. No evidence of acute cerebral infarction, hemorrhage or mass lesion.         CT-CSPINE WITHOUT PLUS RECONS   Final Result      1.  Moderate osteoarthritic changes at the C6-7 level with disc space narrowing and marginal osteophytosis. Further there is moderate cervical spondylotic changes at this level.      2.  No evidence of cervical spine fracture and/or subluxation.      DX-PELVIS-1 OR 2 VIEWS   Final Result      1.  Unremarkable single AP view of the pelvis.      DX-CHEST-LIMITED (1 VIEW)   Final Result      1.  Curvilinear perihilar opacities likely atelectasis and/or parenchymal scarring.      2.  Mild cardiomegaly.           Assessment/Plan  * Septic shock (HCC)- (present on admission)  Assessment & Plan  E.coli  bacteremia  Infectious disease consulting  Antibiotics  C/o right leg cellulitis  6/2 WBC:13.9    Knee effusion, right  Assessment & Plan  S/p aspiration with unremarkable evaluation  PT/OT  Fell recently to his knees.  Bruising and blister on left knee.    Cardiac arrest ([facility])  Assessment & Plan  S/p PEA  Monitor labs, vitals.    Acute metabolic encephalopathy  Assessment & Plan  Improving.  Significant other at bedside 6/2 am states he seems more alert.  Monitor neurochecks  Treating infection as likely etiology    Immunosuppression due to chronic steroid use ([facility])  Assessment & Plan  Restart immunosuppression as per nephrology  On antibiotics for infection.    Bacteremia due to Escherichia coli- (present on admission)  Assessment & Plan  Antibiotics.  Question bladder vs bowel source.  Downtrend in WBC  Monitor vitals, labs    Acute respiratory failure with hypoxia ([facility])  Assessment & Plan  Intubated 5/26 and extubated 6/1  Mobilize as able  Respiratory protocol  Supplemental oxygen and wean as able  CXR shows slight pulmonary edema    PAF (paroxysmal atrial fibrillation) ([facility])- (present on admission)  Assessment & Plan  Monitor on telemetry  Outpatient metoprolol 25mg daily on hold due to septic shock  Reinitiate once able.  Monitor vitals, labs    Type 2 diabetes mellitus with hyperlipidemia ([facility])- (present on admission)  Assessment & Plan  Elevated glucose 6/2  Holding outpatient glyburide, linagliptin, actos.  Monitor accuchecks and cover with SSI  On lantus 35 units BID    Thrombocytopenia ([facility])- (present on admission)  Assessment & Plan  6/2 Plt:109  Monitor CBC    Acute renal failure superimposed on stage 3a chronic kidney disease ([facility])  Assessment & Plan  Nephrology consulting  Hemodialysis as per nephrology  Monitor vitals, I/O's, labs  6/2 BUN:112, Cr:4.17  Avoid nephrotoxins.  Immunosuppression for hx of renal transplant       VTE prophylaxis: heparin ppx    I have performed a physical exam and  reviewed and updated ROS and Plan today (6/4/2022). In review of yesterday's note (6/3/2022), there are no changes except as documented above.

## 2022-06-05 LAB
ALBUMIN SERPL BCP-MCNC: 2.3 G/DL (ref 3.2–4.9)
BUN SERPL-MCNC: 89 MG/DL (ref 8–22)
CALCIUM SERPL-MCNC: 7.7 MG/DL (ref 8.5–10.5)
CHLORIDE SERPL-SCNC: 102 MMOL/L (ref 96–112)
CO2 SERPL-SCNC: 27 MMOL/L (ref 20–33)
CREAT SERPL-MCNC: 2.44 MG/DL (ref 0.5–1.4)
ERYTHROCYTE [DISTWIDTH] IN BLOOD BY AUTOMATED COUNT: 49.5 FL (ref 35.9–50)
GFR SERPLBLD CREATININE-BSD FMLA CKD-EPI: 29 ML/MIN/1.73 M 2
GLUCOSE BLD STRIP.AUTO-MCNC: 109 MG/DL (ref 65–99)
GLUCOSE BLD STRIP.AUTO-MCNC: 114 MG/DL (ref 65–99)
GLUCOSE BLD STRIP.AUTO-MCNC: 120 MG/DL (ref 65–99)
GLUCOSE BLD STRIP.AUTO-MCNC: 145 MG/DL (ref 65–99)
GLUCOSE BLD STRIP.AUTO-MCNC: 57 MG/DL (ref 65–99)
GLUCOSE BLD STRIP.AUTO-MCNC: 64 MG/DL (ref 65–99)
GLUCOSE BLD STRIP.AUTO-MCNC: 83 MG/DL (ref 65–99)
GLUCOSE SERPL-MCNC: 153 MG/DL (ref 65–99)
HCT VFR BLD AUTO: 44.3 % (ref 42–52)
HGB BLD-MCNC: 14.1 G/DL (ref 14–18)
MCH RBC QN AUTO: 29.1 PG (ref 27–33)
MCHC RBC AUTO-ENTMCNC: 31.8 G/DL (ref 33.7–35.3)
MCV RBC AUTO: 91.5 FL (ref 81.4–97.8)
PHOSPHATE SERPL-MCNC: 5.2 MG/DL (ref 2.5–4.5)
PLATELET # BLD AUTO: 180 K/UL (ref 164–446)
PMV BLD AUTO: 12.4 FL (ref 9–12.9)
POTASSIUM SERPL-SCNC: 4.4 MMOL/L (ref 3.6–5.5)
RBC # BLD AUTO: 4.84 M/UL (ref 4.7–6.1)
SODIUM SERPL-SCNC: 141 MMOL/L (ref 135–145)
WBC # BLD AUTO: 12.9 K/UL (ref 4.8–10.8)

## 2022-06-05 PROCEDURE — A9270 NON-COVERED ITEM OR SERVICE: HCPCS | Performed by: HOSPITALIST

## 2022-06-05 PROCEDURE — 700102 HCHG RX REV CODE 250 W/ 637 OVERRIDE(OP): Performed by: HOSPITALIST

## 2022-06-05 PROCEDURE — 99232 SBSQ HOSP IP/OBS MODERATE 35: CPT | Performed by: HOSPITALIST

## 2022-06-05 PROCEDURE — 770001 HCHG ROOM/CARE - MED/SURG/GYN PRIV*

## 2022-06-05 PROCEDURE — 82962 GLUCOSE BLOOD TEST: CPT

## 2022-06-05 PROCEDURE — 700111 HCHG RX REV CODE 636 W/ 250 OVERRIDE (IP): Performed by: STUDENT IN AN ORGANIZED HEALTH CARE EDUCATION/TRAINING PROGRAM

## 2022-06-05 PROCEDURE — 700111 HCHG RX REV CODE 636 W/ 250 OVERRIDE (IP): Performed by: HOSPITALIST

## 2022-06-05 PROCEDURE — 85027 COMPLETE CBC AUTOMATED: CPT

## 2022-06-05 PROCEDURE — 80069 RENAL FUNCTION PANEL: CPT

## 2022-06-05 PROCEDURE — 700105 HCHG RX REV CODE 258: Performed by: HOSPITALIST

## 2022-06-05 RX ADMIN — PREDNISONE 5 MG: 5 TABLET ORAL at 05:42

## 2022-06-05 RX ADMIN — INSULIN GLARGINE-YFGN 35 UNITS: 100 INJECTION, SOLUTION SUBCUTANEOUS at 18:46

## 2022-06-05 RX ADMIN — MEROPENEM 500 MG: 500 INJECTION, POWDER, FOR SOLUTION INTRAVENOUS at 22:47

## 2022-06-05 RX ADMIN — DAKIN'S SOLUTION 0.125% (QUARTER STRENGTH) 1 ML: 0.12 SOLUTION at 08:00

## 2022-06-05 RX ADMIN — MIDODRINE HYDROCHLORIDE 5 MG: 5 TABLET ORAL at 07:38

## 2022-06-05 RX ADMIN — MEROPENEM 500 MG: 500 INJECTION, POWDER, FOR SOLUTION INTRAVENOUS at 05:39

## 2022-06-05 RX ADMIN — HEPARIN SODIUM 5000 UNITS: 5000 INJECTION, SOLUTION INTRAVENOUS; SUBCUTANEOUS at 18:34

## 2022-06-05 RX ADMIN — TACROLIMUS 1 MG: 1 CAPSULE ORAL at 18:34

## 2022-06-05 RX ADMIN — MYCOPHENOLATE MOFETIL 500 MG: 250 CAPSULE ORAL at 05:42

## 2022-06-05 RX ADMIN — MEROPENEM 500 MG: 500 INJECTION, POWDER, FOR SOLUTION INTRAVENOUS at 14:55

## 2022-06-05 RX ADMIN — TACROLIMUS 1 MG: 1 CAPSULE ORAL at 05:42

## 2022-06-05 RX ADMIN — DAKIN'S SOLUTION 0.125% (QUARTER STRENGTH) 473 ML: 0.12 SOLUTION at 22:50

## 2022-06-05 RX ADMIN — ACETAMINOPHEN 650 MG: 325 TABLET ORAL at 10:00

## 2022-06-05 RX ADMIN — MIDODRINE HYDROCHLORIDE 5 MG: 5 TABLET ORAL at 12:35

## 2022-06-05 RX ADMIN — HEPARIN SODIUM 5000 UNITS: 5000 INJECTION, SOLUTION INTRAVENOUS; SUBCUTANEOUS at 05:42

## 2022-06-05 RX ADMIN — MICONAZOLE NITRATE: 20 CREAM TOPICAL at 18:34

## 2022-06-05 RX ADMIN — INSULIN GLARGINE-YFGN 35 UNITS: 100 INJECTION, SOLUTION SUBCUTANEOUS at 05:54

## 2022-06-05 RX ADMIN — MIDODRINE HYDROCHLORIDE 5 MG: 5 TABLET ORAL at 17:30

## 2022-06-05 RX ADMIN — MYCOPHENOLATE MOFETIL 500 MG: 250 CAPSULE ORAL at 18:34

## 2022-06-05 ASSESSMENT — COGNITIVE AND FUNCTIONAL STATUS - GENERAL
DAILY ACTIVITIY SCORE: 12
SUGGESTED CMS G CODE MODIFIER MOBILITY: CL
CLIMB 3 TO 5 STEPS WITH RAILING: A LOT
MOVING FROM LYING ON BACK TO SITTING ON SIDE OF FLAT BED: A LOT
SUGGESTED CMS G CODE MODIFIER DAILY ACTIVITY: CL
DRESSING REGULAR UPPER BODY CLOTHING: A LOT
TOILETING: A LOT
EATING MEALS: A LOT
TURNING FROM BACK TO SIDE WHILE IN FLAT BAD: A LOT
PERSONAL GROOMING: A LOT
MOVING TO AND FROM BED TO CHAIR: A LOT
STANDING UP FROM CHAIR USING ARMS: A LOT
DRESSING REGULAR LOWER BODY CLOTHING: A LOT
HELP NEEDED FOR BATHING: A LOT
WALKING IN HOSPITAL ROOM: A LOT
MOBILITY SCORE: 12

## 2022-06-05 ASSESSMENT — PAIN DESCRIPTION - PAIN TYPE: TYPE: ACUTE PAIN

## 2022-06-05 NOTE — PROGRESS NOTES
Hospital Medicine Daily Progress Note    Date of Service  6/5/2022    Chief Complaint  Jama Altman is a 66 y.o. male admitted 5/24/2022 with     Hospital Course  No notes on file    Interval Problem Update  No acute overnight events, labs are improving even without the benefit of dialysis at this juncture.  Patient is tolerating his diet.  Okay for removal of rectal tube today.  Continue to monitor.    Consultants/Specialty  Infectious disease  Critical care medicine  Nephrology    Code Status  Full Code    Disposition  Pending at this time, patient may benefit from a physical medicine and rehabilitation consult once physical and Occupational Therapy evaluations are in the chart.    Review of Systems  All systems reviewed and negative except as noted per above.    Physical Exam  Temp:  [36.3 °C (97.3 °F)-37.7 °C (99.8 °F)] 36.9 °C (98.4 °F)  Pulse:  [74-92] 90  Resp:  [18-24] 24  BP: ()/(57-59) 90/59  SpO2:  [91 %-95 %] 91 %    General appearance: NAD, conversant, family at bedside  Eyes: anicteric sclerae, moist conjunctivae; no lid-lag; PERRLA  HENT: Atraumatic; oropharynx clear with moist mucous membranes and no mucosal ulcerations; normal hard and soft palate  Neck: Trachea midline; FROM, supple, no thyromegaly or lymphadenopathy  Lungs: CTA, with normal respiratory effort and no intercostal retractions  CV: RRR, no MRGs  Abdomen: Soft, non-tender; no masses or HSM  Extremities: No peripheral edema or extremity lymphadenopathy  Skin: Normal temperature, turgor and texture; no rash, ulcers or subcutaneous nodules  Psych: Answering questions      Current Facility-Administered Medications:   •  acetaminophen (Tylenol) tablet 650 mg, 650 mg, Oral, Q6HRS PRN, Mehrdad Laughlin M.D., 650 mg at 06/05/22 1000  •  tacrolimus (PROGRAF) capsule 1 mg, 1 mg, Oral, BID, Mehrdad Laughlin M.D., 1 mg at 06/05/22 0542  •  senna-docusate (PERICOLACE or SENOKOT S) 8.6-50 MG per tablet 2 Tablet, 2 Tablet, Oral, BID **AND**  polyethylene glycol/lytes (MIRALAX) PACKET 1 Packet, 1 Packet, Oral, QDAY PRN **AND** magnesium hydroxide (MILK OF MAGNESIA) suspension 30 mL, 30 mL, Oral, QDAY PRN **AND** bisacodyl (DULCOLAX) suppository 10 mg, 10 mg, Rectal, QDAY PRN, Mehrdad Laughlin M.D.  •  mycophenolate (CELLCEPT) capsule 500 mg, 500 mg, Oral, BID, Mehrdad Laughlin M.D., 500 mg at 06/05/22 0542  •  midodrine (PROAMATINE) tablet 5 mg, 5 mg, Oral, TID WITH MEALS, Mehrdad Laughlin M.D., 5 mg at 06/05/22 1235  •  oxyCODONE immediate-release (ROXICODONE) tablet 5 mg, 5 mg, Oral, Q4HRS PRN, Mehrdad Laughlin M.D.  •  predniSONE (DELTASONE) tablet 5 mg, 5 mg, Oral, DAILY, Mehrdad Laughlin M.D., 5 mg at 06/05/22 0542  •  meropenem (Merrem) 500 mg in  mL IV-MBP, 500 mg, Intravenous, Q8HRS, Mehrdad Laughlin M.D., Stopped at 06/05/22 1525  •  heparin injection 5,000 Units, 5,000 Units, Subcutaneous, Q12HRS, James Titus M.D., 5,000 Units at 06/05/22 0542  •  insulin GLARGINE (Lantus,Semglee) injection, 35 Units, Subcutaneous, BID, Yo Dylan Titus M.D., 35 Units at 06/05/22 0554  •  insulin regular (HumuLIN R,NovoLIN R) injection, 3-14 Units, Subcutaneous, Q6HRS, 3 Units at 06/04/22 1247 **AND** POC blood glucose manual result, , , Q6H **AND** NOTIFY MD and PharmD, , , Once **AND** Administer 20 grams of glucose (approximately 8 ounces of fruit juice) every 15 minutes PRN FSBG less than 70 mg/dL, , , PRN **AND** dextrose 50% (D50W) injection 25 g, 25 g, Intravenous, Q15 MIN PRN, James Titus M.D.  •  miconazole 2%-zinc oxide (Mabel) topical cream, , Topical, BID, Jeremy M Gonda, M.D., Given at 06/04/22 0507  •  heparin injection 2,800 Units, 2,800 Units, Intracatheter, DIALYSIS PRN, James Sheppard M.D., 2,800 Units at 06/02/22 1400  •  Respiratory Therapy Consult, , Nebulization, Continuous RT, Reuben Swartz M.D.  •  sodium chloride (OCEAN) 0.65 % nasal spray 2 Spray, 2 Spray, Nasal, Q2HRS PRN, Kayy Hopkins M.D.  •  dakins 0.125% (1/4 strength)  topical soln, , Topical, BID, Reuben Swartz M.D., 1 mL at 06/05/22 0800  •  HYDROmorphone (Dilaudid) injection 0.5 mg, 0.5 mg, Intravenous, Q2HRS PRN, Tracey Whitfield M.D., 0.5 mg at 05/31/22 0059      Fluids    Intake/Output Summary (Last 24 hours) at 6/5/2022 1535  Last data filed at 6/5/2022 1300  Gross per 24 hour   Intake 466 ml   Output 1500 ml   Net -1034 ml       Laboratory  Recent Labs     06/03/22  0942 06/05/22  0841   WBC 10.9* 12.9*   RBC 5.00 4.84   HEMOGLOBIN 14.5 14.1   HEMATOCRIT 45.6 44.3   MCV 91.2 91.5   MCH 29.0 29.1   MCHC 31.8* 31.8*   RDW 50.2* 49.5   PLATELETCT 126* 180   MPV 12.1 12.4     Recent Labs     06/03/22  0942 06/04/22  1133 06/05/22  0841   SODIUM 144 143 141   POTASSIUM 3.8 3.8 4.4   CHLORIDE 102 102 102   CO2 27 26 27   GLUCOSE 124* 208* 153*   * 103* 89*   CREATININE 3.23* 2.87* 2.44*   CALCIUM 7.7* 7.9* 7.7*                   Imaging  DX-ABDOMEN FOR TUBE PLACEMENT   Final Result      Enteric feeding tube terminates with the tip projecting over the expected location of the 2nd-3rd portion of the duodenum.      DX-CHEST-LIMITED (1 VIEW)   Final Result      1.  Bibasilar underinflation atelectasis which could obscure an additional process. This is unchanged.   2.  Interstitial opacities likely pulmonary edema   3.  Persistently enlarged cardiac silhouette      DX-CHEST-LIMITED (1 VIEW)   Final Result      1.  Hypoinflation with mildly increased left basilar atelectasis.   2.  Removal of endotracheal tube.      DX-CHEST-LIMITED (1 VIEW)   Final Result         No significant interval change.      DX-CHEST-LIMITED (1 VIEW)   Final Result      Stable areas of patchy atelectasis/consolidation.      DX-CHEST-PORTABLE (1 VIEW)   Final Result      Stable chest x-ray findings.      DX-CHEST-PORTABLE (1 VIEW)   Final Result         1.  Pulmonary edema and/or infiltrates, somewhat increased particularly in the right lung base compared to prior study.   2.  Cardiomegaly       CT-ABDOMEN-PELVIS W/O   Final Result         Limited noncontrast exam      1. Long segment wall thickening from the descending colon to the sigmoid colon could relate to underdistention or colitis. Air-fluid level in the more proximal colon is likely diarrheal disease.      2. Right lower quadrant transplant kidney with retained prior contrast, likely secondary to renal failure/ATN/contrast nephropathy. No hydronephrosis.      Absent right kidney. Polycystic left kidney.      CT-EXTREMITY, LOWER W/O RIGHT   Final Result      1. Postsurgical changes of right total knee arthroplasty.   2. Right knee joint effusion with thickening of the joint capsule and surrounding soft tissue edema.   3. Circumferential edema involving the soft tissues of the right lower leg with an anterior soft tissue defect and with areas of clustering on the medial right lower leg skin surface.   4. Arteriosclerosis.      DX-CHEST-PORTABLE (1 VIEW)   Final Result      1.  Unchanged position of supporting devices are visualized extent   2.  No visible pneumothorax following chest compressions   3.  Unchanged atelectasis and possible superimposed interstitial pulmonary edema or atypical pneumonia      US-EXTREMITY ARTERY LOWER UNILAT RIGHT   Final Result      US-MIRELLA SINGLE LEVEL BILAT   Final Result      US-EXTREMITY ARTERY LOWER BILAT W/MIRELLA (COMBO)   Final Result      DX-CHEST-PORTABLE (1 VIEW)   Final Result         1.  Pulmonary edema and/or infiltrates, somewhat increased particularly in the right lung base compared to prior study.   2.  Cardiomegaly      DX-ABDOMEN FOR TUBE PLACEMENT   Final Result      1. The tip of the feeding tube terminates over the junction of the gastric pylorus and duodenal bulb.   2. The remainder is stable.      DX-CHEST-PORTABLE (1 VIEW)   Final Result      1. Interval decrease in size of the right pleural effusion.   2. No left pleural effusion.   3. No visible pneumothorax status post bronchoscopy.   4. Interval  placement of a Dobbhoff feeding tube.   5. Improving parenchymal loss in the right lung base, incompletely resolved.   6. The remainder is stable.      DX-CHEST-PORTABLE (1 VIEW)   Final Result      1. Interval development of a moderate right pleural effusion after placement of a left internal jugular dialysis catheter, abutting the right lateral wall of the right atrium. Verification of line position with contrast injection under fluoroscopy is    offered.   2. Improved perihilar atelectasis.   3. The remainder is stable.      I, Dr. Matthew Brown, discussed the results of this examination directly by phone with Dr. Reuben Swartz on 5/26/2022 at 0855 hours.      EC-ECHOCARDIOGRAM COMPLETE W/ CONT   Final Result      DX-CHEST-LIMITED (1 VIEW)   Final Result         1. Right internal jugular central venous access catheter placed in the interval terminates over the upper aspect of the right atrium. No postprocedure visible pneumothorax.   2. Stable patchy parenchymal opacities in the lungs, with a stable small left pleural effusion.      DX-CHEST-PORTABLE (1 VIEW)   Final Result         1.  Pulmonary edema and/or infiltrates are identified, which are stable since the prior exam.   2.  Trace bilateral pleural effusions   3.  Cardiomegaly      CT-EXTREMITY, LOWER W/O RIGHT   Final Result      1.  Status post right total knee replacement.      2.  Diffuse atherosclerotic calcification of the arterial system of the right lower extremity suspicious for diabetes mellitus.      3.  Soft tissue attenuation in the subcutaneous tissues of the mid to distal lower leg and foot suspicious for cellulitis.      4.  No evidence of soft tissue ulceration in the right lower leg or foot.      5.  No evidence of acute osteomyelitis in the right lower leg or foot.      6.  Small subcutaneous abscesses cannot be excluded without the use of intravenous contrast.      DX-CHEST-PORTABLE (1 VIEW)   Final Result      Left internal jugular  central venous access catheter terminates over a persistent left superior vena cava. No postprocedure visible pneumothorax.      The remainder is stable.      DX-TIBIA AND FIBULA RIGHT   Final Result      1. Right lower leg soft tissue swelling.   2. No acute fracture or subluxation.   3. Postsurgical changes of right total knee arthroplasty.      US-ABDOMEN F.A.S.T. LTD (FOR ED USE ONLY)   Final Result      No free fluid seen in all 4 quadrants.      Negative FAST scan.            CT-ABDOMEN-PELVIS WITH   Final Result      1. No acute posttraumatic findings in the abdomen or pelvis.   2. Bibasilar subsegmental atelectasis.   3. Right pelvic transplant kidney without hydronephrosis.   4. Polycystic left kidney.   5. Diverticulosis without diverticulitis. Normal appendix.      CT-CTA CHEST PULMONARY ARTERY W/ RECONS   Final Result      1.  No CT evidence of pulmonary emboli.      2.  Bibasilar atelectasis.      3.  No evidence of rib fracture.      4. Diffuse coronary artery calcification.      CT-HEAD W/O   Final Result      1.  Cerebral atrophy.      2.  Otherwise, Head CT without contrast within normal limits. No evidence of acute cerebral infarction, hemorrhage or mass lesion.         CT-CSPINE WITHOUT PLUS RECONS   Final Result      1.  Moderate osteoarthritic changes at the C6-7 level with disc space narrowing and marginal osteophytosis. Further there is moderate cervical spondylotic changes at this level.      2.  No evidence of cervical spine fracture and/or subluxation.      DX-PELVIS-1 OR 2 VIEWS   Final Result      1.  Unremarkable single AP view of the pelvis.      DX-CHEST-LIMITED (1 VIEW)   Final Result      1.  Curvilinear perihilar opacities likely atelectasis and/or parenchymal scarring.      2.  Mild cardiomegaly.           Assessment/Plan  * Septic shock (HCC)- (present on admission)  Assessment & Plan  E.coli bacteremia  Infectious disease consulting  Antibiotics  C/o right leg cellulitis  6/2  WBC:13.9    Knee effusion, right  Assessment & Plan  S/p aspiration with unremarkable evaluation  PT/OT  Fell recently to his knees.  Bruising and blister on left knee.    Cardiac arrest ([facility])  Assessment & Plan  S/p PEA  Monitor labs, vitals.    Acute metabolic encephalopathy  Assessment & Plan  Improving.  Significant other at bedside 6/2 am states he seems more alert.  Monitor neurochecks  Treating infection as likely etiology    Immunosuppression due to chronic steroid use ([facility])  Assessment & Plan  Restart immunosuppression as per nephrology  On antibiotics for infection.    Bacteremia due to Escherichia coli- (present on admission)  Assessment & Plan  Antibiotics.  Question bladder vs bowel source.  Downtrend in WBC  Monitor vitals, labs    Acute respiratory failure with hypoxia ([facility])  Assessment & Plan  Intubated 5/26 and extubated 6/1  Mobilize as able  Respiratory protocol  Supplemental oxygen and wean as able  CXR shows slight pulmonary edema    PAF (paroxysmal atrial fibrillation) ([facility])- (present on admission)  Assessment & Plan  Monitor on telemetry  Outpatient metoprolol 25mg daily on hold due to septic shock  Reinitiate once able.  Monitor vitals, labs    Type 2 diabetes mellitus with hyperlipidemia ([facility])- (present on admission)  Assessment & Plan  Elevated glucose 6/2  Holding outpatient glyburide, linagliptin, actos.  Monitor accuchecks and cover with SSI  On lantus 35 units BID    Thrombocytopenia ([facility])- (present on admission)  Assessment & Plan  6/2 Plt:109  Monitor CBC    Acute renal failure superimposed on stage 3a chronic kidney disease ([facility])  Assessment & Plan  Nephrology consulting  Hemodialysis as per nephrology  Monitor vitals, I/O's, labs  6/2 BUN:112, Cr:4.17  Avoid nephrotoxins.  Immunosuppression for hx of renal transplant       VTE prophylaxis: heparin ppx    I have performed a physical exam and reviewed and updated ROS and Plan today (6/5/2022). In review of yesterday's note (6/4/2022),  there are no changes except as documented above.

## 2022-06-05 NOTE — PROGRESS NOTES
[facility] -  PROGRESS NOTE               Author: Roderick Ramirez M.D. Date & Time: 6/5/2022  11:11 AM     HPI:  Patient is a 66 year old male with a PMHx of renal transplant 9/10/2010 for polycystic kidney disease, DM, thrombocytopenia, CKD3b, pafib, covid19 infection, who presents for AMS.  He was brought in activated trauma for fall, CT imaging has been negative, but was in severe septic shock started on pressors and give nfluid boluses.  On broad spectrum abx.  Currently complains of right leg pain but thinks its better.  Confirms his last dose of tacrolimus and cellcept was yesterday.  Currently on levophed    DAILY NEPHROLOGY SUMMARY:  5/24: Consult done  5/25: NAEO, no complaints, feels a bit better, family at bedside  5/26: Decompensated overnight, intubated due to resp. Distress and pressors initiated  5/27: NAEO, no complaints, hemodynamics decompensated and CRRT initiated instead, tolerated well overnight, still on it this AM  5/28: transitioned to daily iHD for now, +13.8L for hospitalization, remains intubated, on pressor support, Tmax 101.1F, WBC 28.9  5/29: tolerated HD, UF 3L 5/28, remains intubated and on pressor support  5/30: Tolerated UF, improved hemodynamics, remains on pressors, remains on mechanical ventilation   5/31: Seen on Dialysis, vasc cath sluggish despite TPA, only able to run   6/1: Tolerated HD, extubated, UOP increasing-2.7L yesterday, net neg 5L  6/2: Intermittent encephalopathy per wife,  2.5L UOP yesterday, soft Bps, feeling stronger today  6/3: low Bps, brisk UOP-2.6L, with minimal PO intake, thirsty, NO UF with HD, labs pending  6/4: Denies pain or SOB.  UOP 1.5L  No new labs  6/5: Denies pain or SOB.  Feels thirsty.  Creatinine down to 2.4.    REVIEW OF SYSTEMS:    Limited due to pt communication     PMH/PSH/SH/FH:   Reviewed and unchanged since admission note    CURRENT MEDICATIONS:   Reviewed from admission to present day    VS:  VS:  BP (!)  "90/59 Comment: rn notified  Pulse 90   Temp 36.9 °C (98.4 °F) (Temporal)   Resp (!) 24 Comment: rn notified  Ht 1.956 m (6' 5\")   Wt 122 kg (268 lb 11.9 oz)   SpO2 91%   BMI 31.87 kg/m²   GENERAL: Ill appearing  CV: no pedal edema  RESP:non-labored  GI: Soft  MSK: No joint deformities   SKIN: ecchymoses  NEURO: No tremor  PSYCH: Deferred    Fluids:  In: 326 [P.O.:296; Enteral:30]  Out: 2800     LABS:  Recent Labs     06/03/22  0942 06/04/22  1133 06/05/22  0841   SODIUM 144 143 141   POTASSIUM 3.8 3.8 4.4   CHLORIDE 102 102 102   CO2 27 26 27   GLUCOSE 124* 208* 153*   * 103* 89*   CREATININE 3.23* 2.87* 2.44*   CALCIUM 7.7* 7.9* 7.7*     IMAGING:   All imaging reviewed from admission to present day    IMPRESSION:  # SANKET  - Etiology likely 2/2 ATN  - has received contrast  - RRT dependent since 5/27, now with signs of recovery  # DDKT  - 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 Coli bacteremia/septic shock  - Source unclear, ?colitis on imaging  - Abx on board  - pressor support  # Hyperkalemia, improved  - No ECG changes  # Encephalopathy  - Etiology likely 2/2 hypoperfusion/sepsis  # Acute respiratory failure  - Intubated 5/26  # RLE ulcer  - Trauma  # type 2 DM  # thrombocytopenia  - worsening    PLAN:  -Continue hold dialysis   -Suggish vasc cath (only able to run at ~250BFR despite TPA/repeated interventions) but trying to avoid exchanging given some e/o recovery.   -Holding lasix  -tacrolimus 1mg BID, MMF  -Daily labs and weights  -renal formula tube feeds  -Monitor I/O  -Dose all meds per eGFR < 15    Thank you    "

## 2022-06-05 NOTE — PROGRESS NOTES
Hypoglycemia Intervention    Hypoglycemia protocol intervention:  Blood glucose 63 at 1813  Intervention: 8 oz of fruit juice   Repeat blood glucose 1635 at 81.  Intervention: 4 oz of fruit juice; had chicken with dinner

## 2022-06-05 NOTE — PROGRESS NOTES
Report received from day shift RN Aaron. Assumed care of patient at 1900, patient is A&O x4. Patient is a high Fall risk, bed locked and in lowest position, bed alarm on. Patient reports pain 7/10. Plan of care reviewed with patient, will implement and continue to monitor. Hourly rounding is in place and call light/belongings within reach.

## 2022-06-05 NOTE — CARE PLAN
The patient is Stable - Low risk of patient condition declining or worsening    Shift Goals  Clinical Goals: wound dressing, IV abx, monitor BSG  Patient Goals: comfort  Family Goals: updates on discharge    Progress made toward(s) clinical / shift goals:  pt educated on poc. Pt denies pain intervention at this time. Skin is CD, barrier cream applied. Wound dressings changed. Fall precautions in place.       Problem: Knowledge Deficit - Standard  Goal: Patient and family/care givers will demonstrate understanding of plan of care, disease process/condition, diagnostic tests and medications  Outcome: Progressing     Problem: Pain - Standard  Goal: Alleviation of pain or a reduction in pain to the patient’s comfort goal  Outcome: Progressing     Problem: Skin Integrity  Goal: Skin integrity is maintained or improved  Outcome: Progressing     Problem: Fall Risk  Goal: Patient will remain free from falls  Outcome: Progressing           Patient is not progressing towards the following goals:

## 2022-06-05 NOTE — DISCHARGE PLANNING
"TCN following. HTH/SCP chart review completed. TCN met with patient with support at bedside. TCN was advised patient son has been researching SNF facilities \"but it has been difficult over the weekend.\" Advised TCN to return tomorrow. TCN offered card with phone number and encouraged son to call with any questions.     TCN will continue to follow patient and collaborate with discharge planning team as appropriate. Thank you.     Previously completed:  - ST 6/3/22 with patient advanced to PO intake of PU4, MT2. OT 6/3/22 with recommendations for post acute level of care prior to return home  - Choice forms: IRF choice obtained 6/3/22.  Will appreciate PT consult and consideration of PMR consult.  - [facility] introduced (Y), referral (not sent) as patient current discharge disposition is likely post acute level of care   "

## 2022-06-05 NOTE — CARE PLAN
Problem: Knowledge Deficit - Standard  Goal: Patient and family/care givers will demonstrate understanding of plan of care, disease process/condition, diagnostic tests and medications  Outcome: Progressing     Problem: Pain - Standard  Goal: Alleviation of pain or a reduction in pain to the patient’s comfort goal  Outcome: Progressing   The patient is Watcher - Medium risk of patient condition declining or worsening    Shift Goals  Clinical Goals: Monitor VS, pain management, labs, and BSG  Patient Goals: Comfort  Family Goals: updates on POC    Progress made toward(s) clinical / shift goals:  Pt has been updated on POC, pt understands and is agreeable to discontinuing the bowel management system. Family at the bedside, all questions answered. Pt reporting 3/10 pain, medicated per MAR. Goal is for pt to have a pain level of 2 or less throughout shift.     Patient is not progressing towards the following goals:

## 2022-06-05 NOTE — PROGRESS NOTES
Assumed care at 0700. Received report from NOC shift RN. Pt is lethargic in bed, A&Ox 4, on RA, reports a pain level of 3/10 medicated with Tylenol. Fall precautions and appropriate signs in place. Call light and belongings within reach. Bed alarm and hourly rounding in place. Communication board updated. Pt denies any additional needs at this time. Family at the bedside.

## 2022-06-06 LAB
GLUCOSE BLD STRIP.AUTO-MCNC: 106 MG/DL (ref 65–99)
GLUCOSE BLD STRIP.AUTO-MCNC: 126 MG/DL (ref 65–99)
GLUCOSE BLD STRIP.AUTO-MCNC: 79 MG/DL (ref 65–99)
GLUCOSE BLD STRIP.AUTO-MCNC: 86 MG/DL (ref 65–99)

## 2022-06-06 PROCEDURE — 700105 HCHG RX REV CODE 258: Performed by: HOSPITALIST

## 2022-06-06 PROCEDURE — 700111 HCHG RX REV CODE 636 W/ 250 OVERRIDE (IP): Performed by: HOSPITALIST

## 2022-06-06 PROCEDURE — 700111 HCHG RX REV CODE 636 W/ 250 OVERRIDE (IP): Performed by: STUDENT IN AN ORGANIZED HEALTH CARE EDUCATION/TRAINING PROGRAM

## 2022-06-06 PROCEDURE — A9270 NON-COVERED ITEM OR SERVICE: HCPCS | Performed by: HOSPITALIST

## 2022-06-06 PROCEDURE — 82962 GLUCOSE BLOOD TEST: CPT | Mod: 91

## 2022-06-06 PROCEDURE — 97162 PT EVAL MOD COMPLEX 30 MIN: CPT

## 2022-06-06 PROCEDURE — 700102 HCHG RX REV CODE 250 W/ 637 OVERRIDE(OP): Performed by: HOSPITALIST

## 2022-06-06 PROCEDURE — 99232 SBSQ HOSP IP/OBS MODERATE 35: CPT | Performed by: HOSPITALIST

## 2022-06-06 PROCEDURE — 770001 HCHG ROOM/CARE - MED/SURG/GYN PRIV*

## 2022-06-06 PROCEDURE — 700105 HCHG RX REV CODE 258: Performed by: STUDENT IN AN ORGANIZED HEALTH CARE EDUCATION/TRAINING PROGRAM

## 2022-06-06 RX ORDER — SODIUM CHLORIDE, SODIUM LACTATE, POTASSIUM CHLORIDE, CALCIUM CHLORIDE 600; 310; 30; 20 MG/100ML; MG/100ML; MG/100ML; MG/100ML
INJECTION, SOLUTION INTRAVENOUS CONTINUOUS
Status: DISCONTINUED | OUTPATIENT
Start: 2022-06-06 | End: 2022-06-08

## 2022-06-06 RX ORDER — DEXTROSE MONOHYDRATE 25 G/50ML
25 INJECTION, SOLUTION INTRAVENOUS
Status: DISCONTINUED | OUTPATIENT
Start: 2022-06-06 | End: 2022-06-06

## 2022-06-06 RX ORDER — DEXTROSE MONOHYDRATE 25 G/50ML
25 INJECTION, SOLUTION INTRAVENOUS
Status: DISCONTINUED | OUTPATIENT
Start: 2022-06-06 | End: 2022-06-22 | Stop reason: HOSPADM

## 2022-06-06 RX ADMIN — MEROPENEM 500 MG: 500 INJECTION, POWDER, FOR SOLUTION INTRAVENOUS at 21:16

## 2022-06-06 RX ADMIN — MIDODRINE HYDROCHLORIDE 5 MG: 5 TABLET ORAL at 08:11

## 2022-06-06 RX ADMIN — HEPARIN SODIUM 5000 UNITS: 5000 INJECTION, SOLUTION INTRAVENOUS; SUBCUTANEOUS at 04:31

## 2022-06-06 RX ADMIN — MIDODRINE HYDROCHLORIDE 5 MG: 5 TABLET ORAL at 17:43

## 2022-06-06 RX ADMIN — INSULIN GLARGINE-YFGN 35 UNITS: 100 INJECTION, SOLUTION SUBCUTANEOUS at 17:45

## 2022-06-06 RX ADMIN — SODIUM CHLORIDE, POTASSIUM CHLORIDE, SODIUM LACTATE AND CALCIUM CHLORIDE: 600; 310; 30; 20 INJECTION, SOLUTION INTRAVENOUS at 15:53

## 2022-06-06 RX ADMIN — MEROPENEM 500 MG: 500 INJECTION, POWDER, FOR SOLUTION INTRAVENOUS at 04:31

## 2022-06-06 RX ADMIN — PREDNISONE 5 MG: 5 TABLET ORAL at 04:31

## 2022-06-06 RX ADMIN — MICONAZOLE NITRATE: 20 CREAM TOPICAL at 04:31

## 2022-06-06 RX ADMIN — MYCOPHENOLATE MOFETIL 500 MG: 250 CAPSULE ORAL at 04:31

## 2022-06-06 RX ADMIN — TACROLIMUS 1 MG: 1 CAPSULE ORAL at 04:31

## 2022-06-06 RX ADMIN — MIDODRINE HYDROCHLORIDE 5 MG: 5 TABLET ORAL at 13:28

## 2022-06-06 RX ADMIN — TACROLIMUS 1 MG: 1 CAPSULE ORAL at 17:43

## 2022-06-06 RX ADMIN — MICONAZOLE NITRATE: 20 CREAM TOPICAL at 17:43

## 2022-06-06 RX ADMIN — DAKIN'S SOLUTION 0.125% (QUARTER STRENGTH) 473 ML: 0.12 SOLUTION at 08:11

## 2022-06-06 RX ADMIN — HEPARIN SODIUM 5000 UNITS: 5000 INJECTION, SOLUTION INTRAVENOUS; SUBCUTANEOUS at 17:43

## 2022-06-06 RX ADMIN — INSULIN GLARGINE-YFGN 35 UNITS: 100 INJECTION, SOLUTION SUBCUTANEOUS at 08:11

## 2022-06-06 RX ADMIN — DAKIN'S SOLUTION 0.125% (QUARTER STRENGTH) 473 ML: 0.12 SOLUTION at 21:16

## 2022-06-06 RX ADMIN — MYCOPHENOLATE MOFETIL 500 MG: 250 CAPSULE ORAL at 17:43

## 2022-06-06 RX ADMIN — MEROPENEM 500 MG: 500 INJECTION, POWDER, FOR SOLUTION INTRAVENOUS at 14:41

## 2022-06-06 ASSESSMENT — LIFESTYLE VARIABLES
TOTAL SCORE: 0
ALCOHOL_USE: NO
EVER FELT BAD OR GUILTY ABOUT YOUR DRINKING: NO
HAVE YOU EVER FELT YOU SHOULD CUT DOWN ON YOUR DRINKING: NO
HOW MANY TIMES IN THE PAST YEAR HAVE YOU HAD 5 OR MORE DRINKS IN A DAY: 0
AVERAGE NUMBER OF DAYS PER WEEK YOU HAVE A DRINK CONTAINING ALCOHOL: 0
TOTAL SCORE: 0
TOTAL SCORE: 0
ON A TYPICAL DAY WHEN YOU DRINK ALCOHOL HOW MANY DRINKS DO YOU HAVE: 0
EVER HAD A DRINK FIRST THING IN THE MORNING TO STEADY YOUR NERVES TO GET RID OF A HANGOVER: NO
CONSUMPTION TOTAL: NEGATIVE
DOES PATIENT WANT TO STOP DRINKING: NO
HAVE PEOPLE ANNOYED YOU BY CRITICIZING YOUR DRINKING: NO

## 2022-06-06 ASSESSMENT — COGNITIVE AND FUNCTIONAL STATUS - GENERAL
MOBILITY SCORE: 7
CLIMB 3 TO 5 STEPS WITH RAILING: TOTAL
MOVING FROM LYING ON BACK TO SITTING ON SIDE OF FLAT BED: UNABLE
STANDING UP FROM CHAIR USING ARMS: A LOT
MOVING TO AND FROM BED TO CHAIR: UNABLE
WALKING IN HOSPITAL ROOM: TOTAL
SUGGESTED CMS G CODE MODIFIER MOBILITY: CM
TURNING FROM BACK TO SIDE WHILE IN FLAT BAD: UNABLE

## 2022-06-06 ASSESSMENT — PAIN DESCRIPTION - PAIN TYPE: TYPE: ACUTE PAIN

## 2022-06-06 ASSESSMENT — GAIT ASSESSMENTS: GAIT LEVEL OF ASSIST: UNABLE TO PARTICIPATE

## 2022-06-06 NOTE — DIETARY
Nutrition Services: Brief Update    Pt previously on tube feeding.  Diet advanced to L4/L2 (pureed solids with mildly thick liquids, consistent CHO modification) 6/3.  Tube feeding discontinued, Cotrak removed 6/4.  Per ADL flow sheet, PO has been <25-75% of meals.  Pt may benefit from oral nutrition supplements to optimize intake.  RD will add supplement order and adjust menu accordingly.    RD will continue to follow for adequate PO intake.

## 2022-06-06 NOTE — DISCHARGE PLANNING
Received Choice form at 101  Agency/Facility Name: [facility]   [place]   Referral sent per Choice form @ 0317

## 2022-06-06 NOTE — CARE PLAN
Problem: Knowledge Deficit - Standard  Goal: Patient and family/care givers will demonstrate understanding of plan of care, disease process/condition, diagnostic tests and medications  Outcome: Progressing     Problem: Skin Integrity  Goal: Skin integrity is maintained or improved  Outcome: Progressing   The patient is Watcher - Medium risk of patient condition declining or worsening    Shift Goals  Clinical Goals: Monitor labs, q2 turns, wound management  Patient Goals: Pain control  Family Goals: Daily labs ordered    Progress made toward(s) clinical / shift goals:  Pt participates in q2h turns, barrier paste, cream, and wipes used during cleaning. Waffle overlay in place, protective mepilex in place, condom cath in place for incontinence.     Patient is not progressing towards the following goals:

## 2022-06-06 NOTE — DISCHARGE PLANNING
Agency/Facility Name: [facility]  Spoke To: [place]   Outcome: Per Admissions Coordinator, facility will still leaving referral pending for improvements.

## 2022-06-06 NOTE — PROGRESS NOTES
Assumed care at 0700. Received report from NOC shift RN. Pt is awake but fatigued in bed, A&Ox 4, on RA, reports a pain level of 3/10, denies intervention at this time. Fall precautions and appropriate signs in place. Call light and belongings within reach. Bed alarm and hourly rounding in place. Communication board updated. Pt requesting water and apple juice. FSBG 105 this AM, sliding scale insulin not required.

## 2022-06-06 NOTE — PROGRESS NOTES
Hospital Medicine Daily Progress Note    Date of Service  6/6/2022    Chief Complaint  Jama Altman is a 66 y.o. male admitted 5/24/2022 with     Hospital Course  No notes on file    Interval Problem Update  No acute overnight events, labs have been improving x4 days now, okay to check q48h at this time, excellent UOP, IVF started by nephro earlier today.  Patient is tolerating his diet. C/o some thirst.  Continue to monitor.    Consultants/Specialty  Infectious disease  Critical care medicine  Nephrology    Code Status  Full Code    Disposition  Pending at this time, rehab unit PM&R consults requested 6/6, will also refer to SNF if declined by them.     Review of Systems  All systems reviewed and negative except as noted per above.    Physical Exam  Temp:  [36.7 °C (98.1 °F)-37.2 °C (98.9 °F)] 36.9 °C (98.5 °F)  Pulse:  [] 95  Resp:  [18-19] 18  BP: ()/(54-61) 99/61  SpO2:  [92 %-93 %] 92 %    General appearance: NAD, conversant, family at bedside  Eyes: anicteric sclerae, moist conjunctivae; no lid-lag; PERRLA  HENT: Atraumatic; oropharynx clear with moist mucous membranes and no mucosal ulcerations; normal hard and soft palate  Neck: Trachea midline; FROM, supple, no thyromegaly or lymphadenopathy  Lungs: CTA, with normal respiratory effort and no intercostal retractions  CV: RRR, no MRGs  Abdomen: Soft, non-tender; no masses or HSM  Extremities: No peripheral edema or extremity lymphadenopathy  Skin: Normal temperature, turgor and texture; no rash, ulcers or subcutaneous nodules  Psych: Answering questions      Current Facility-Administered Medications:   •  insulin regular (HumuLIN R,NovoLIN R) injection, 3-14 Units, Subcutaneous, 4X/DAY ACHS **AND** POC blood glucose manual result, , , Q6H **AND** NOTIFY MD and PharmD, , , Once **AND** Administer 20 grams of glucose (approximately 8 ounces of fruit juice) every 15 minutes PRN FSBG less than 70 mg/dL, , , PRN **AND** dextrose 50% (D50W)  injection 25 g, 25 g, Intravenous, Q15 MIN PRN, Nick Ge R.N.  •  lactated ringers infusion, , Intravenous, Continuous, Roderick Ramirez M.D., Last Rate: 75 mL/hr at 06/06/22 1553, New Bag at 06/06/22 1553  •  acetaminophen (Tylenol) tablet 650 mg, 650 mg, Oral, Q6HRS PRN, Mehrdad Laughlin M.D., 650 mg at 06/05/22 1000  •  tacrolimus (PROGRAF) capsule 1 mg, 1 mg, Oral, BID, Mehrdad Laughlin M.D., 1 mg at 06/06/22 0431  •  senna-docusate (PERICOLACE or SENOKOT S) 8.6-50 MG per tablet 2 Tablet, 2 Tablet, Oral, BID **AND** polyethylene glycol/lytes (MIRALAX) PACKET 1 Packet, 1 Packet, Oral, QDAY PRN **AND** magnesium hydroxide (MILK OF MAGNESIA) suspension 30 mL, 30 mL, Oral, QDAY PRN **AND** bisacodyl (DULCOLAX) suppository 10 mg, 10 mg, Rectal, QDAY PRN, Mehrdad Laughlin M.D.  •  mycophenolate (CELLCEPT) capsule 500 mg, 500 mg, Oral, BID, Mehrdad Laughlin M.D., 500 mg at 06/06/22 0431  •  midodrine (PROAMATINE) tablet 5 mg, 5 mg, Oral, TID WITH MEALS, Mehrdad Laughlin M.D., 5 mg at 06/06/22 1328  •  oxyCODONE immediate-release (ROXICODONE) tablet 5 mg, 5 mg, Oral, Q4HRS PRN, Mehrdad Laughlin M.D.  •  predniSONE (DELTASONE) tablet 5 mg, 5 mg, Oral, DAILY, Mehrdad Laughlin M.D., 5 mg at 06/06/22 0431  •  meropenem (Merrem) 500 mg in  mL IV-MBP, 500 mg, Intravenous, Q8HRS, Mehrdad Laughlin M.D., Stopped at 06/06/22 1511  •  heparin injection 5,000 Units, 5,000 Units, Subcutaneous, Q12HRS, James Titus M.D., 5,000 Units at 06/06/22 0431  •  insulin GLARGINE (Lantus,Semglee) injection, 35 Units, Subcutaneous, BID, James Titus M.D., 35 Units at 06/06/22 0811  •  miconazole 2%-zinc oxide (Mabel) topical cream, , Topical, BID, Jeremy M Gonda, M.D., Given at 06/06/22 0431  •  heparin injection 2,800 Units, 2,800 Units, Intracatheter, DIALYSIS PRN, James Sheppard M.D., 2,800 Units at 06/02/22 1400  •  Respiratory Therapy Consult, , Nebulization, Continuous RT, Reuben Swartz M.D.  •  sodium chloride (OCEAN) 0.65 % nasal  spray 2 Spray, 2 Spray, Nasal, Q2HRS PRN, Kayy Hopkins M.D.  •  dakins 0.125% (1/4 strength) topical soln, , Topical, BID, Reuben Swartz M.D., 473 mL at 06/06/22 0811  •  HYDROmorphone (Dilaudid) injection 0.5 mg, 0.5 mg, Intravenous, Q2HRS PRN, Tracey Whitfield M.D., 0.5 mg at 05/31/22 0059      Fluids    Intake/Output Summary (Last 24 hours) at 6/6/2022 1610  Last data filed at 6/6/2022 1500  Gross per 24 hour   Intake 120 ml   Output 2900 ml   Net -2780 ml       Laboratory  Recent Labs     06/05/22  0841   WBC 12.9*   RBC 4.84   HEMOGLOBIN 14.1   HEMATOCRIT 44.3   MCV 91.5   MCH 29.1   MCHC 31.8*   RDW 49.5   PLATELETCT 180   MPV 12.4     Recent Labs     06/04/22  1133 06/05/22  0841   SODIUM 143 141   POTASSIUM 3.8 4.4   CHLORIDE 102 102   CO2 26 27   GLUCOSE 208* 153*   * 89*   CREATININE 2.87* 2.44*   CALCIUM 7.9* 7.7*                   Imaging  DX-ABDOMEN FOR TUBE PLACEMENT   Final Result      Enteric feeding tube terminates with the tip projecting over the expected location of the 2nd-3rd portion of the duodenum.      DX-CHEST-LIMITED (1 VIEW)   Final Result      1.  Bibasilar underinflation atelectasis which could obscure an additional process. This is unchanged.   2.  Interstitial opacities likely pulmonary edema   3.  Persistently enlarged cardiac silhouette      DX-CHEST-LIMITED (1 VIEW)   Final Result      1.  Hypoinflation with mildly increased left basilar atelectasis.   2.  Removal of endotracheal tube.      DX-CHEST-LIMITED (1 VIEW)   Final Result         No significant interval change.      DX-CHEST-LIMITED (1 VIEW)   Final Result      Stable areas of patchy atelectasis/consolidation.      DX-CHEST-PORTABLE (1 VIEW)   Final Result      Stable chest x-ray findings.      DX-CHEST-PORTABLE (1 VIEW)   Final Result         1.  Pulmonary edema and/or infiltrates, somewhat increased particularly in the right lung base compared to prior study.   2.  Cardiomegaly      CT-ABDOMEN-PELVIS W/O    Final Result         Limited noncontrast exam      1. Long segment wall thickening from the descending colon to the sigmoid colon could relate to underdistention or colitis. Air-fluid level in the more proximal colon is likely diarrheal disease.      2. Right lower quadrant transplant kidney with retained prior contrast, likely secondary to renal failure/ATN/contrast nephropathy. No hydronephrosis.      Absent right kidney. Polycystic left kidney.      CT-EXTREMITY, LOWER W/O RIGHT   Final Result      1. Postsurgical changes of right total knee arthroplasty.   2. Right knee joint effusion with thickening of the joint capsule and surrounding soft tissue edema.   3. Circumferential edema involving the soft tissues of the right lower leg with an anterior soft tissue defect and with areas of clustering on the medial right lower leg skin surface.   4. Arteriosclerosis.      DX-CHEST-PORTABLE (1 VIEW)   Final Result      1.  Unchanged position of supporting devices are visualized extent   2.  No visible pneumothorax following chest compressions   3.  Unchanged atelectasis and possible superimposed interstitial pulmonary edema or atypical pneumonia      US-EXTREMITY ARTERY LOWER UNILAT RIGHT   Final Result      US-MIRELLA SINGLE LEVEL BILAT   Final Result      US-EXTREMITY ARTERY LOWER BILAT W/MIRELLA (COMBO)   Final Result      DX-CHEST-PORTABLE (1 VIEW)   Final Result         1.  Pulmonary edema and/or infiltrates, somewhat increased particularly in the right lung base compared to prior study.   2.  Cardiomegaly      DX-ABDOMEN FOR TUBE PLACEMENT   Final Result      1. The tip of the feeding tube terminates over the junction of the gastric pylorus and duodenal bulb.   2. The remainder is stable.      DX-CHEST-PORTABLE (1 VIEW)   Final Result      1. Interval decrease in size of the right pleural effusion.   2. No left pleural effusion.   3. No visible pneumothorax status post bronchoscopy.   4. Interval placement of a Dobbhoff  feeding tube.   5. Improving parenchymal loss in the right lung base, incompletely resolved.   6. The remainder is stable.      DX-CHEST-PORTABLE (1 VIEW)   Final Result      1. Interval development of a moderate right pleural effusion after placement of a left internal jugular dialysis catheter, abutting the right lateral wall of the right atrium. Verification of line position with contrast injection under fluoroscopy is    offered.   2. Improved perihilar atelectasis.   3. The remainder is stable.      I, Dr. Matthew Brown, discussed the results of this examination directly by phone with Dr. Reuben Swartz on 5/26/2022 at 0855 hours.      EC-ECHOCARDIOGRAM COMPLETE W/ CONT   Final Result      DX-CHEST-LIMITED (1 VIEW)   Final Result         1. Right internal jugular central venous access catheter placed in the interval terminates over the upper aspect of the right atrium. No postprocedure visible pneumothorax.   2. Stable patchy parenchymal opacities in the lungs, with a stable small left pleural effusion.      DX-CHEST-PORTABLE (1 VIEW)   Final Result         1.  Pulmonary edema and/or infiltrates are identified, which are stable since the prior exam.   2.  Trace bilateral pleural effusions   3.  Cardiomegaly      CT-EXTREMITY, LOWER W/O RIGHT   Final Result      1.  Status post right total knee replacement.      2.  Diffuse atherosclerotic calcification of the arterial system of the right lower extremity suspicious for diabetes mellitus.      3.  Soft tissue attenuation in the subcutaneous tissues of the mid to distal lower leg and foot suspicious for cellulitis.      4.  No evidence of soft tissue ulceration in the right lower leg or foot.      5.  No evidence of acute osteomyelitis in the right lower leg or foot.      6.  Small subcutaneous abscesses cannot be excluded without the use of intravenous contrast.      DX-CHEST-PORTABLE (1 VIEW)   Final Result      Left internal jugular central venous access  catheter terminates over a persistent left superior vena cava. No postprocedure visible pneumothorax.      The remainder is stable.      DX-TIBIA AND FIBULA RIGHT   Final Result      1. Right lower leg soft tissue swelling.   2. No acute fracture or subluxation.   3. Postsurgical changes of right total knee arthroplasty.      US-ABDOMEN F.A.S.T. LTD (FOR ED USE ONLY)   Final Result      No free fluid seen in all 4 quadrants.      Negative FAST scan.            CT-ABDOMEN-PELVIS WITH   Final Result      1. No acute posttraumatic findings in the abdomen or pelvis.   2. Bibasilar subsegmental atelectasis.   3. Right pelvic transplant kidney without hydronephrosis.   4. Polycystic left kidney.   5. Diverticulosis without diverticulitis. Normal appendix.      CT-CTA CHEST PULMONARY ARTERY W/ RECONS   Final Result      1.  No CT evidence of pulmonary emboli.      2.  Bibasilar atelectasis.      3.  No evidence of rib fracture.      4. Diffuse coronary artery calcification.      CT-HEAD W/O   Final Result      1.  Cerebral atrophy.      2.  Otherwise, Head CT without contrast within normal limits. No evidence of acute cerebral infarction, hemorrhage or mass lesion.         CT-CSPINE WITHOUT PLUS RECONS   Final Result      1.  Moderate osteoarthritic changes at the C6-7 level with disc space narrowing and marginal osteophytosis. Further there is moderate cervical spondylotic changes at this level.      2.  No evidence of cervical spine fracture and/or subluxation.      DX-PELVIS-1 OR 2 VIEWS   Final Result      1.  Unremarkable single AP view of the pelvis.      DX-CHEST-LIMITED (1 VIEW)   Final Result      1.  Curvilinear perihilar opacities likely atelectasis and/or parenchymal scarring.      2.  Mild cardiomegaly.           Assessment/Plan  * Septic shock (HCC)- (present on admission)  Assessment & Plan  E.coli bacteremia  Infectious disease consulting  Antibiotics  C/o right leg cellulitis  6/2 WBC:13.9    Knee  effusion, right  Assessment & Plan  S/p aspiration with unremarkable evaluation  PT/OT  Fell recently to his knees.  Bruising and blister on left knee.    Cardiac arrest ([facility])  Assessment & Plan  S/p PEA  Monitor labs, vitals.    Acute metabolic encephalopathy  Assessment & Plan  Improving.  Significant other at bedside 6/2 am states he seems more alert.  Monitor neurochecks  Treating infection as likely etiology    Immunosuppression due to chronic steroid use ([facility])  Assessment & Plan  Restart immunosuppression as per nephrology  On antibiotics for infection.    Bacteremia due to Escherichia coli- (present on admission)  Assessment & Plan  Antibiotics.  Question bladder vs bowel source.  Downtrend in WBC  Monitor vitals, labs    Acute respiratory failure with hypoxia ([facility])  Assessment & Plan  Intubated 5/26 and extubated 6/1  Mobilize as able  Respiratory protocol  Supplemental oxygen and wean as able  CXR shows slight pulmonary edema    PAF (paroxysmal atrial fibrillation) ([facility])- (present on admission)  Assessment & Plan  Monitor on telemetry  Outpatient metoprolol 25mg daily on hold due to septic shock  Reinitiate once able.  Monitor vitals, labs    Type 2 diabetes mellitus with hyperlipidemia ([facility])- (present on admission)  Assessment & Plan  Elevated glucose 6/2  Holding outpatient glyburide, linagliptin, actos.  Monitor accuchecks and cover with SSI  On lantus 35 units BID    Thrombocytopenia ([facility])- (present on admission)  Assessment & Plan  6/2 Plt:109  Monitor CBC    Acute renal failure superimposed on stage 3a chronic kidney disease ([facility])  Assessment & Plan  Nephrology consulting  Hemodialysis as per nephrology  Monitor vitals, I/O's, labs  6/2 BUN:112, Cr:4.17  Avoid nephrotoxins.  Immunosuppression for hx of renal transplant       VTE prophylaxis: heparin ppx    I have performed a physical exam and reviewed and updated ROS and Plan today (6/6/2022). In review of yesterday's note (6/5/2022), there are no  changes except as documented above.

## 2022-06-06 NOTE — CARE PLAN
The patient is Watcher - Medium risk of patient condition declining or worsening    Shift Goals  Clinical Goals: Ensure comfort, Q2 turns, pain management, ensure safety  Patient Goals: Rest  Family Goals: Stay updated on plan of care, ensure pt stays dry    Progress made toward(s) clinical / shift goals:        Problem: Knowledge Deficit - Standard  Goal: Patient and family/care givers will demonstrate understanding of plan of care, disease process/condition, diagnostic tests and medications  Outcome: Progressing     Problem: Pain - Standard  Goal: Alleviation of pain or a reduction in pain to the patient’s comfort goal  Outcome: Progressing     Problem: Skin Integrity  Goal: Skin integrity is maintained or improved  Outcome: Progressing     Problem: Fall Risk  Goal: Patient will remain free from falls  Outcome: Progressing     Problem: Safety - Medical Restraint  Goal: Remains free of injury from restraints (Restraint for Interference with Medical Device)  Outcome: Progressing  Goal: Free from restraint(s) (Restraint for Interference with Medical Device)  Outcome: Progressing     Problem: Discharge Barriers/Planning  Goal: Patient's continuum of care needs are met  Outcome: Progressing     Problem: Urinary Elimination  Goal: Establish and maintain regular urinary output  Outcome: Progressing     Problem: Rectal Tube  Goal: Fecal output will be contained and skin will remain free from irritation  Outcome: Progressing     Problem: Mobility  Goal: Patient's capacity to carry out activities will improve  Outcome: Progressing     Problem: Self Care  Goal: Patient will have the ability to perform ADLs independently or with assistance (bathe, groom, dress, toilet and feed)  Outcome: Progressing     Problem: Infection - Standard  Goal: Patient will remain free from infection  Outcome: Progressing     Problem: Wound/ / Incision Healing  Goal: Patient's wound/surgical incision will decrease in size and heals  properly  Outcome: Progressing       Patient is not progressing towards the following goals:  N/A

## 2022-06-06 NOTE — PROGRESS NOTES
[facility] -  PROGRESS NOTE               Author: Roderick Ramirez M.D. Date & Time: 6/6/2022  11:24 AM     HPI:  Patient is a 66 year old male with a PMHx of renal transplant 9/10/2010 for polycystic kidney disease, DM, thrombocytopenia, CKD3b, pafib, covid19 infection, who presents for AMS.  He was brought in activated trauma for fall, CT imaging has been negative, but was in severe septic shock started on pressors and give nfluid boluses.  On broad spectrum abx.  Currently complains of right leg pain but thinks its better.  Confirms his last dose of tacrolimus and cellcept was yesterday.  Currently on levophed    DAILY NEPHROLOGY SUMMARY:  5/24: Consult done  5/25: NAEO, no complaints, feels a bit better, family at bedside  5/26: Decompensated overnight, intubated due to resp. Distress and pressors initiated  5/27: NAEO, no complaints, hemodynamics decompensated and CRRT initiated instead, tolerated well overnight, still on it this AM  5/28: transitioned to daily iHD for now, +13.8L for hospitalization, remains intubated, on pressor support, Tmax 101.1F, WBC 28.9  5/29: tolerated HD, UF 3L 5/28, remains intubated and on pressor support  5/30: Tolerated UF, improved hemodynamics, remains on pressors, remains on mechanical ventilation   5/31: Seen on Dialysis, vasc cath sluggish despite TPA, only able to run   6/1: Tolerated HD, extubated, UOP increasing-2.7L yesterday, net neg 5L  6/2: Intermittent encephalopathy per wife,  2.5L UOP yesterday, soft Bps, feeling stronger today  6/3: low Bps, brisk UOP-2.6L, with minimal PO intake, thirsty, NO UF with HD, labs pending  6/4: Denies pain or SOB.  UOP 1.5L  No new labs  6/5: Denies pain or SOB.  Feels thirsty.  Creatinine down to 2.4.  6/6: Denies pain or SOB.  Feels thirsty.  No new labs today.  UOP at 3.1L    REVIEW OF SYSTEMS:    Limited due to pt communication     PMH/PSH/SH/FH:   Reviewed and unchanged since admission note    CURRENT  "MEDICATIONS:   Reviewed from admission to present day    VS:  VS:  /58   Pulse 75   Temp 37.2 °C (98.9 °F) (Temporal)   Resp 18   Ht 1.956 m (6' 5\")   Wt 122 kg (268 lb 11.9 oz)   SpO2 92%   BMI 31.87 kg/m²   GENERAL: Ill appearing  CV: no pedal edema  RESP:non-labored  GI: Soft  MSK: No joint deformities   SKIN: ecchymoses  NEURO: No tremor  PSYCH: Deferred    Fluids:  In: 570 [P.O.:570]  Out: 3100     LABS:  Recent Labs     06/04/22  1133 06/05/22  0841   SODIUM 143 141   POTASSIUM 3.8 4.4   CHLORIDE 102 102   CO2 26 27   GLUCOSE 208* 153*   * 89*   CREATININE 2.87* 2.44*   CALCIUM 7.9* 7.7*     IMAGING:   All imaging reviewed from admission to present day    IMPRESSION:  # SANKET  - Etiology likely 2/2 ATN  - has received contrast  - RRT dependent since 5/27, now with signs of recovery  # DDKT  - 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 Coli bacteremia/septic shock  - Source unclear, ?colitis on imaging  - Abx on board  - pressor support  # Hyperkalemia, improved  - No ECG changes  # Encephalopathy  - Etiology likely 2/2 hypoperfusion/sepsis  # Acute respiratory failure  - Intubated 5/26  # RLE ulcer  - Trauma  # type 2 DM  # thrombocytopenia  - worsening    PLAN:  -Repeat labs tomorrow, if creatinine trends down, can remove dialysis catheter  -Start LR at 75cc/hr  -Holding lasix  -tacrolimus 1mg BID, MMF  -Daily labs and weights  -renal formula tube feeds  -Monitor I/O  -Dose all meds per eGFR     Thank you    "

## 2022-06-06 NOTE — THERAPY
Physical Therapy   Initial Evaluation     Patient Name: Jama Altman  Age:  66 y.o., Sex:  male  Medical Record #: [id number]  Today's Date: 6/6/2022     Precautions  Precautions: Fall Risk;Swallow Precautions ( See Comments)    Assessment  Patient is 66 y.o. male admitted after GLF with AMS, severe septic shock, chronic immunosuppression, encephalopathy, SANKET, and thrombocytopenia. PMH includes end stage polycystic kidney disease, DM, chronic thrombocytopenia, CKD 3B, and A fib.    Today patient primarily limited by pain, decreased strength, balance, activity tolerance, and cognition.  Patient mobilized as described below, tolerance for mobility limited by RLE pain, especially in dependent position.  Attempted STS x 2 with FWW and max A x 2 people however patient unable to achieve buttocks clearance.  Assisted patient with lateral scoot at EOB with max A.  Educated patient on ankle pumps, heel slides or SAQ, and glute isometrics to assist with strengthening.  Patient would benefit from continued acute skilled PT and post acute placement to address impairments and maximize safety with functional mobility.      Plan    Recommend Physical Therapy 3 times per week until therapy goals are met for the following treatments:  Bed Mobility, Equipment, Gait Training, Neuro Re-Education / Balance, Self Care/Home Evaluation, Stair Training, Therapeutic Activities, and Therapeutic Exercises    DC Equipment Recommendations: Unable to determine at this time  Discharge Recommendations: Recommend post-acute placement for additional physical therapy services prior to discharge home     Objective     06/06/22 1216   Precautions   Precautions Fall Risk;Swallow Precautions ( See Comments)   Prior Living Situation   Prior Services Home-Independent   Housing / Facility 1 Story House   Steps Into Home 1   Equipment Owned None   Lives with - Patient's Self Care Capacity Alone and Able to Care For Self   Comments Pt stated he lives alone,  "has friends who can stay with him if needed.  Pt's friend \"Dorian\" present & supportive   Prior Level of Functional Mobility   Bed Mobility Independent   Transfer Status Independent   Ambulation Independent   Assistive Devices Used None   Stairs Independent   History of Falls   History of Falls Yes   Cognition    Cognition / Consciousness X   Level of Consciousness Alert   Comments Pleasant & cooperative, somewhat delayed processing.  Flat affect   Active ROM Lower Body    Active ROM Lower Body  X   Comments LLE WFL, RLE limited by pain   Strength Lower Body   Lower Body Strength  X   Comments LLE grossly 4/5 & painful with resistance, RLE NT due to c/o pain   Sensation Lower Body   Lower Extremity Sensation   X   Comments Pt reported RLE numbness/tingling with dependent position   Balance Assessment   Sitting Balance (Static) Fair -   Sitting Balance (Dynamic) Poor +   Weight Shift Sitting Poor   Comments Unable to stand   Gait Analysis   Gait Level Of Assist Unable to Participate   Weight Bearing Status WBAT BLE   Bed Mobility    Supine to Sit Moderate Assist   Sit to Supine Minimal Assist (for LEs)   Scooting Moderate Assist (supine, max A seated)   Comments HOB elevated, w/ rail   Functional Mobility   Sit to Stand Unable to Participate   Bed, Chair, Wheelchair Transfer Unable to Participate   Mobility bed mobility, attempted STS   Comments Attempted STS x 2, pt's friend assisted.  Pt unable to achieve buttock clearance with 2 person max A and FWW, painful with RLW WB   Activity Tolerance   Sitting Edge of Bed 10 min   Edema / Skin Assessment   Comments RLE foot to prox tibia dressing   Short Term Goals    Short Term Goal # 1 Pt will perform supine <> sit without bed features within 6 visits in order to progress toward PLOF   Short Term Goal # 2 Pt will perform STS with FWW and min A within 6 visits in order to progress OOB mobility   Short Term Goal # 3 Pt will perform functional transfers with FWW and min A " within 6 visits in order to progress OOB mobility   Short Term Goal # 4 Pt will ambulate 100 ft with FWW and mod A within 6 visits in order to progress functional mobility   Anticipated Discharge Equipment and Recommendations   DC Equipment Recommendations Unable to determine at this time   Discharge Recommendations Recommend post-acute placement for additional physical therapy services prior to discharge home

## 2022-06-06 NOTE — DISCHARGE PLANNING
Case Management Discharge Planning    Admission Date: 5/24/2022  GMLOS: 12.4  ALOS: 13    6-Clicks ADL Score: 12  6-Clicks Mobility Score: 12  PT and/or OT Eval ordered: Yes  Post-acute Referrals Ordered: Yes  Post-acute Choice Obtained: Yes  Has referral(s) been sent to post-acute provider:  Yes      Anticipated Discharge Dispo: Discharge Disposition: Disch to  rehab facility or distinct part unit (62)    DME Needed: Unable to determine at this time.    Action(s) Taken: Updated Provider/Nurse on Discharge Plan, Choice obtained, Referral(s) sent and Family Conference: RN CM spoke with patient's brother, Pepe Altman, and brother's wife at the bedside regarding discharge planning needs and barriers. Pepe voiced concerns that labs had not been drawn today and questioned why it was not done if they were trying to evaluate his kidney function, progress, and any need for HD. RN CM recommended patient's brother discuss with bedside RN and attending, who could better address the current treatment plan. They acknowledged and indicated they would speak with the bedside RN and attending regarding additional questions about patient's treatment plan. Discuss patient's treatment and discharge plans with medical and nursing teams during IDT rounds.    Escalations Completed: Provider, Pending Discharge Destination and Bedside RN    Medically Clear: No    Next Steps: f/u with medical and nursing teams regarding treatment and discharge plans, needs, and barriers. F/u with patient and family regarding discharge needs and barriers.    Barriers to Discharge: Medical clearance, Pending Placement and Pending PT Evaluation    Is the patient up for discharge tomorrow: No

## 2022-06-06 NOTE — DISCHARGE PLANNING
TCN following. HTH/SCP chart review completed. Appreciate collaboration with Lydia MONTENEGRO CM. Choice forms for SNF level of care collected from patient bedside and provided to Lydia MONTENEGRO CM. Patient and family at bedside stated first choice remains RIRF level of care. SNF choice provided for [facility] and [facility].    TCN will continue to follow patient and collaborate with discharge planning team as appropriate.     Previously completed:  - ST 6/3/22 with patient advanced to PO intake of [facility], MT2. OT 6/3/22 with recommendations for post acute level of care prior to return home  - Choice forms: IRF choice obtained 6/3/22.  Will appreciate PT consult and consideration of PMR consult.  - GSC introduced (Y), referral (not sent) as patient current discharge disposition is likely post acute level of care

## 2022-06-07 LAB
ALBUMIN SERPL BCP-MCNC: 2.3 G/DL (ref 3.2–4.9)
BUN SERPL-MCNC: 71 MG/DL (ref 8–22)
CALCIUM SERPL-MCNC: 8 MG/DL (ref 8.5–10.5)
CHLORIDE SERPL-SCNC: 107 MMOL/L (ref 96–112)
CO2 SERPL-SCNC: 26 MMOL/L (ref 20–33)
CREAT SERPL-MCNC: 2.08 MG/DL (ref 0.5–1.4)
ERYTHROCYTE [DISTWIDTH] IN BLOOD BY AUTOMATED COUNT: 50 FL (ref 35.9–50)
GFR SERPLBLD CREATININE-BSD FMLA CKD-EPI: 35 ML/MIN/1.73 M 2
GLUCOSE BLD STRIP.AUTO-MCNC: 101 MG/DL (ref 65–99)
GLUCOSE BLD STRIP.AUTO-MCNC: 108 MG/DL (ref 65–99)
GLUCOSE BLD STRIP.AUTO-MCNC: 134 MG/DL (ref 65–99)
GLUCOSE BLD STRIP.AUTO-MCNC: 61 MG/DL (ref 65–99)
GLUCOSE BLD STRIP.AUTO-MCNC: 92 MG/DL (ref 65–99)
GLUCOSE SERPL-MCNC: 51 MG/DL (ref 65–99)
HCT VFR BLD AUTO: 40.4 % (ref 42–52)
HGB BLD-MCNC: 12.6 G/DL (ref 14–18)
MCH RBC QN AUTO: 28.4 PG (ref 27–33)
MCHC RBC AUTO-ENTMCNC: 31.2 G/DL (ref 33.7–35.3)
MCV RBC AUTO: 91.2 FL (ref 81.4–97.8)
PHOSPHATE SERPL-MCNC: 5.2 MG/DL (ref 2.5–4.5)
PLATELET # BLD AUTO: 233 K/UL (ref 164–446)
PMV BLD AUTO: 11.6 FL (ref 9–12.9)
POTASSIUM SERPL-SCNC: 4.5 MMOL/L (ref 3.6–5.5)
RBC # BLD AUTO: 4.43 M/UL (ref 4.7–6.1)
SODIUM SERPL-SCNC: 143 MMOL/L (ref 135–145)
WBC # BLD AUTO: 12.5 K/UL (ref 4.8–10.8)

## 2022-06-07 PROCEDURE — 97535 SELF CARE MNGMENT TRAINING: CPT | Mod: CO

## 2022-06-07 PROCEDURE — 700111 HCHG RX REV CODE 636 W/ 250 OVERRIDE (IP): Performed by: STUDENT IN AN ORGANIZED HEALTH CARE EDUCATION/TRAINING PROGRAM

## 2022-06-07 PROCEDURE — 80069 RENAL FUNCTION PANEL: CPT

## 2022-06-07 PROCEDURE — 85027 COMPLETE CBC AUTOMATED: CPT

## 2022-06-07 PROCEDURE — 700105 HCHG RX REV CODE 258: Performed by: STUDENT IN AN ORGANIZED HEALTH CARE EDUCATION/TRAINING PROGRAM

## 2022-06-07 PROCEDURE — 700102 HCHG RX REV CODE 250 W/ 637 OVERRIDE(OP): Performed by: HOSPITALIST

## 2022-06-07 PROCEDURE — 82962 GLUCOSE BLOOD TEST: CPT

## 2022-06-07 PROCEDURE — A9270 NON-COVERED ITEM OR SERVICE: HCPCS | Performed by: HOSPITALIST

## 2022-06-07 PROCEDURE — 99223 1ST HOSP IP/OBS HIGH 75: CPT | Performed by: PHYSICAL MEDICINE & REHABILITATION

## 2022-06-07 PROCEDURE — 770001 HCHG ROOM/CARE - MED/SURG/GYN PRIV*

## 2022-06-07 PROCEDURE — 700111 HCHG RX REV CODE 636 W/ 250 OVERRIDE (IP): Performed by: HOSPITALIST

## 2022-06-07 PROCEDURE — 99232 SBSQ HOSP IP/OBS MODERATE 35: CPT | Performed by: STUDENT IN AN ORGANIZED HEALTH CARE EDUCATION/TRAINING PROGRAM

## 2022-06-07 RX ADMIN — MIDODRINE HYDROCHLORIDE 5 MG: 5 TABLET ORAL at 17:52

## 2022-06-07 RX ADMIN — PREDNISONE 5 MG: 5 TABLET ORAL at 04:09

## 2022-06-07 RX ADMIN — SODIUM CHLORIDE, POTASSIUM CHLORIDE, SODIUM LACTATE AND CALCIUM CHLORIDE: 600; 310; 30; 20 INJECTION, SOLUTION INTRAVENOUS at 04:09

## 2022-06-07 RX ADMIN — DAKIN'S SOLUTION 0.125% (QUARTER STRENGTH) 473 ML: 0.12 SOLUTION at 20:00

## 2022-06-07 RX ADMIN — MICONAZOLE NITRATE: 20 CREAM TOPICAL at 04:09

## 2022-06-07 RX ADMIN — HEPARIN SODIUM 5000 UNITS: 5000 INJECTION, SOLUTION INTRAVENOUS; SUBCUTANEOUS at 17:46

## 2022-06-07 RX ADMIN — MYCOPHENOLATE MOFETIL 500 MG: 250 CAPSULE ORAL at 04:09

## 2022-06-07 RX ADMIN — TACROLIMUS 1 MG: 1 CAPSULE ORAL at 04:09

## 2022-06-07 RX ADMIN — MICONAZOLE NITRATE: 20 CREAM TOPICAL at 17:48

## 2022-06-07 RX ADMIN — MIDODRINE HYDROCHLORIDE 5 MG: 5 TABLET ORAL at 14:12

## 2022-06-07 RX ADMIN — DAKIN'S SOLUTION 0.125% (QUARTER STRENGTH) 473 ML: 0.12 SOLUTION at 14:12

## 2022-06-07 RX ADMIN — DOCUSATE SODIUM 50 MG AND SENNOSIDES 8.6 MG 2 TABLET: 8.6; 5 TABLET, FILM COATED ORAL at 04:09

## 2022-06-07 RX ADMIN — HEPARIN SODIUM 5000 UNITS: 5000 INJECTION, SOLUTION INTRAVENOUS; SUBCUTANEOUS at 04:09

## 2022-06-07 RX ADMIN — MYCOPHENOLATE MOFETIL 500 MG: 250 CAPSULE ORAL at 17:46

## 2022-06-07 RX ADMIN — TACROLIMUS 1 MG: 1 CAPSULE ORAL at 17:46

## 2022-06-07 ASSESSMENT — COGNITIVE AND FUNCTIONAL STATUS - GENERAL
TOILETING: A LOT
DRESSING REGULAR UPPER BODY CLOTHING: A LOT
DAILY ACTIVITIY SCORE: 12
DRESSING REGULAR LOWER BODY CLOTHING: A LOT
HELP NEEDED FOR BATHING: A LOT
PERSONAL GROOMING: A LOT
EATING MEALS: A LOT
SUGGESTED CMS G CODE MODIFIER DAILY ACTIVITY: CL

## 2022-06-07 ASSESSMENT — PAIN DESCRIPTION - PAIN TYPE
TYPE: ACUTE PAIN

## 2022-06-07 ASSESSMENT — FIBROSIS 4 INDEX: FIB4 SCORE: 1.77

## 2022-06-07 NOTE — CONSULTS
Physical Medicine and Rehabilitation Consultation          Date of initial consultation: 6/7/2022  Consulting provider: Mehrdad Laughlin MD  Reason for consultation: assess for acute inpatient rehab appropriateness  LOS: 14 Day(s)    Chief complaint: debility     HPI: The patient is a 66 y.o. right hand dominant male with a past medical history of hypertension, diabetes mellitus, polycystic kidney disease, thrombocytopenia, CKD stage III, paroxysmal atrial fibrillation, renal transplant 2010 on Immunosuppressants;  who presented on 5/24/2022 11:10 AM with altered mental status, with possible recent ground-level fall, found to be in A. fib with RVR, ultimately admitted for septic shock with E. coli bacteremia and right leg cellulitis.  Patient developed respiratory distress on 5/26, was intubated, started on CRRT on 5/27, then on 5/28 had PEA arrest requiring CPR, transition to full HD, extubated on 6/1.     Patient is currently lying comfortably in bed, reports he has left rib pain, overall feeling very exhausted.  Patient states he is not able to participate in 3 hours of therapy as of today, but feels he would be able to perform better in 2 or 3 days.  Patient had an episode of bowel incontinence during physical examination today.      ROS  Pertinent positives are mentioned in the HPI, all others reviewed and are negative.    Social Hx:  1 SH  1 [place]  With: Alone    THERAPY:  Restrictions: Fall risk  PT: Functional mobility   6/6: Unable to participate in gait or sit to stand.  Mod assist bed mobility    OT: ADLs  6/3: Total assist lower body dressing and toileting    SLP:   6/3: Puréed solids mildly thick liquids    IMAGING:  CT head 5/24/2022  1.  Cerebral atrophy.  2.  Otherwise, Head CT without contrast within normal limits. No evidence of acute cerebral infarction, hemorrhage or mass lesion.    PROCEDURES:  ERMELINDA Esquivel 5/28  RIGHT knee arthrocentesis.    PMH:  Past Medical History:   Diagnosis Date   •  Benign essential hypertension    • Hyperlipoproteinemia    • Hypertension     not on meds anymore   • Pain    • Polycystic kidney 9/10/10    RIGHT KIDNEY TRANSPLANT   • Sleep apnea    • Snoring        PSH:  Past Surgical History:   Procedure Laterality Date   • KNEE MANIPULATION  2/16/2012    Performed by LATOYA CONNER at SURGERY [facility] ORS   • KNEE UNICOMPARTMENTAL  12/23/2011    Performed by LATOYA CONNER at SURGERY [facility] ORS   • KNEE ARTHROSCOPY  12/23/2011    Performed by LATOYA CONNER at SURGERY [facility] ORS   • KNEE ARTHROSCOPY  5/3/2011    Performed by HANANE GOLDMAN at SURGERY SAME DAY [facility] ORS   • MENISCECTOMY, KNEE, MEDIAL  5/3/2011    Performed by HANANE GOLDMAN at SURGERY SAME DAY [facility] ORS   • OTHER  9/10/10    RIGHT KIDNEY TRANSPLANT   • KNEE ARTHROPLASTY TOTAL  1/12/07    RIGHT   • KNEE ARTHROSCOPY  4/10/06    RIGHT   • OTHER ORTHOPEDIC SURGERY  7/8/74    LEFT KNEE DEBRIDEMENT       FHX:  Non-pertinent to today's issues    Medications:  Current Facility-Administered Medications   Medication Dose   • insulin GLARGINE (Lantus,Semglee) injection  25 Units   • lactated ringers infusion     • insulin regular (HumuLIN R,NovoLIN R) injection  3-14 Units    And   • dextrose 50% (D50W) injection 25 g  25 g   • acetaminophen (Tylenol) tablet 650 mg  650 mg   • tacrolimus (PROGRAF) capsule 1 mg  1 mg   • magnesium hydroxide (MILK OF MAGNESIA) suspension 30 mL  30 mL    And   • senna-docusate (PERICOLACE or SENOKOT S) 8.6-50 MG per tablet 2 Tablet  2 Tablet    And   • polyethylene glycol/lytes (MIRALAX) PACKET 1 Packet  1 Packet    And   • bisacodyl (DULCOLAX) suppository 10 mg  10 mg   • mycophenolate (CELLCEPT) capsule 500 mg  500 mg   • midodrine (PROAMATINE) tablet 5 mg  5 mg   • oxyCODONE immediate-release (ROXICODONE) tablet 5 mg  5 mg   • predniSONE (DELTASONE) tablet 5 mg  5 mg   • heparin injection 5,000 Units  5,000 Units   • miconazole 2%-zinc oxide (Mabel) topical cream     •  "heparin injection 2,800 Units  2,800 Units   • Respiratory Therapy Consult     • sodium chloride (OCEAN) 0.65 % nasal spray 2 Spray  2 Spray   • dakins 0.125% (1/4 strength) topical soln     • HYDROmorphone (Dilaudid) injection 0.5 mg  0.5 mg       Allergies:  Allergies   Allergen Reactions   • Doxycycline Rash     Sweats and shakes: 9/28/17: Clarified allergy with patient. Allergy was in 1998 and he doesn't remember what happened. He thought the medication is for pain.  Tolerates doxycycline 9/2017         Physical Exam:  Vitals: /65   Pulse 62   Temp 36.9 °C (98.4 °F) (Temporal)   Resp 18   Ht 1.956 m (6' 5\")   Wt 120 kg (264 lb 5.3 oz)   SpO2 95%   Gen: NAD  Head: NC/AT  Eyes/ Nose/ Mouth: moist mucous membranes  Cardio: RRR, good distal perfusion, warm extremities  Pulm: normal respiratory effort, no cyanosis   Abd: Soft NTND, negative borborygmi   Ext: No peripheral edema. No calf tenderness. No clubbing.    Mental status: answers questions appropriately follows commands  Speech: fluent, no aphasia or dysarthria    Motor:      Upper Extremity  Myotome R L   Shoulder flexion C5 4/5 4/5   Elbow flexion C5 4/5 4/5   Wrist extension C6 4/5 4/5   Elbow extension C7 4/5 4/5   Finger flexion C8 4/5 4/5   Finger abduction T1 4/5 4/5     Lower Extremity Myotome R L   Hip flexion L2 3/5 3/5   Knee extension L3 3/5 3/5   Ankle dorsiflexion L4 1/5 1/5   Toe extension L5 1/5 1/5   Ankle plantarflexion S1 1/5 1/5     Sensory:   intact to light touch through out    DTRs:  Right  Left    Brachioradialis  2+  2+   Patella tendon  2+ 2+       Labs: Reviewed and significant for   Recent Labs     06/05/22  0841 06/07/22  0329   RBC 4.84 4.43*   HEMOGLOBIN 14.1 12.6*   HEMATOCRIT 44.3 40.4*   PLATELETCT 180 233     Recent Labs     06/04/22  1133 06/05/22  0841 06/07/22  0329   SODIUM 143 141 143   POTASSIUM 3.8 4.4 4.5   CHLORIDE 102 102 107   CO2 26 27 26   GLUCOSE 208* 153* 51*   * 89* 71*   CREATININE 2.87* " 2.44* 2.08*   CALCIUM 7.9* 7.7* 8.0*     Recent Results (from the past 24 hour(s))   POCT glucose device results    Collection Time: 06/06/22  1:27 PM   Result Value Ref Range    POC Glucose, Blood 126 (H) 65 - 99 mg/dL   POCT glucose device results    Collection Time: 06/06/22  5:45 PM   Result Value Ref Range    POC Glucose, Blood 79 65 - 99 mg/dL   POCT glucose device results    Collection Time: 06/06/22  9:15 PM   Result Value Ref Range    POC Glucose, Blood 86 65 - 99 mg/dL   CBC WITHOUT DIFFERENTIAL    Collection Time: 06/07/22  3:29 AM   Result Value Ref Range    WBC 12.5 (H) 4.8 - 10.8 K/uL    RBC 4.43 (L) 4.70 - 6.10 M/uL    Hemoglobin 12.6 (L) 14.0 - 18.0 g/dL    Hematocrit 40.4 (L) 42.0 - 52.0 %    MCV 91.2 81.4 - 97.8 fL    MCH 28.4 27.0 - 33.0 pg    MCHC 31.2 (L) 33.7 - 35.3 g/dL    RDW 50.0 35.9 - 50.0 fL    Platelet Count 233 164 - 446 K/uL    MPV 11.6 9.0 - 12.9 fL   Renal Function Panel    Collection Time: 06/07/22  3:29 AM   Result Value Ref Range    Sodium 143 135 - 145 mmol/L    Potassium 4.5 3.6 - 5.5 mmol/L    Chloride 107 96 - 112 mmol/L    Co2 26 20 - 33 mmol/L    Glucose 51 (L) 65 - 99 mg/dL    Creatinine 2.08 (H) 0.50 - 1.40 mg/dL    Bun 71 (H) 8 - 22 mg/dL    Calcium 8.0 (L) 8.5 - 10.5 mg/dL    Phosphorus 5.2 (H) 2.5 - 4.5 mg/dL    Albumin 2.3 (L) 3.2 - 4.9 g/dL   ESTIMATED GFR    Collection Time: 06/07/22  3:29 AM   Result Value Ref Range    GFR (CKD-EPI) 35 (A) >60 mL/min/1.73 m 2   POCT glucose device results    Collection Time: 06/07/22  7:41 AM   Result Value Ref Range    POC Glucose, Blood 61 (L) 65 - 99 mg/dL   POCT glucose device results    Collection Time: 06/07/22  8:20 AM   Result Value Ref Range    POC Glucose, Blood 92 65 - 99 mg/dL         ASSESSMENT:  Patient is a 66 y.o. male admitted with altered mental status, found to have septic shock with E. coli bacteremia secondary to right leg cellulitis, hospitalization complicated by respiratory distress requiring intubation and  PEA arrest requiring CPR, patient currently on hemodialysis.    [facility] Code / Diagnosis to Support: 0009 - Cardiac, 0010.9 - Pulmonary Disorders: Other Pulmonary and 0017.8 - Medically Complex: Medical/Surgical Complications    Rehabilitation: Impaired ADLs and mobility  Potential candidate for IPR    Barriers to transfer include: Insurance authorization, TCCs to verify disposition, medical clearance and bed availability     All cases are subject to administrative review and recommendations may change    Additional Recommendations:  -Likely will need SNF placement.  -Possible candidate for IPR.  Patient will need to be able to tolerate 3 hours of therapy 5 days a week, and have discharge support.  -Patient is diffusely weak in the bilateral upper and lower extremities, worse in the lower extremities.  To be functionally ready for IPR, patient will least need to be able to stand up with min assist and walk a few feet at mod assist or better.  -Continue PT and OT while in house, would appreciate updated OT note  -Pain control per primary team  -Hold stool softeners in the setting of loose stool  -Wound care for RLE  -TCC to follow patient for tolerance and to confirm discharge support  -PMR to follow in the periphery for rehab appropriateness, please reach out with questions or request for medical management      Medical Complexity:  Diabetes  CKD stage III with history of renal transplant  Bacteremia secondary to right lower extremity cellulitis    DVT PPX: Heparin 5K 3 times daily      Thank you for allowing us to participate in the care of this patient.     Patient was seen for 81 minutes on unit/floor of which > 50% of time was spent on counseling and coordination of care regarding the above, including prognosis, risk reduction, benefits of treatment, and options for next stage of care.    Tony Cope, DO   Physical Medicine and Rehabilitation     Please note that this dictation was created using voice recognition  software. I have made every reasonable attempt to correct obvious errors, but there may be errors of grammar and possibly content that I did not discover before finalizing the note.

## 2022-06-07 NOTE — HEART FAILURE PROGRAM
Efforts continue to place in post acute. Have suggested that the following appointments be pushed out.     Regan 10, 2022  2:20 PM   (Arrive by 2:05 PM)   Established Patient with Ganesh Benton III, M.D.   [facility] [place] ([place] [address]   [place] [address]   [phone]      Jun 22, 2022  8:45 AM   Heart Failure New Patient with Jaspreet Mosqueda M.D.   [facility] (--) [address]   [place] [id number]   [phone]

## 2022-06-07 NOTE — DISCHARGE PLANNING
Per physiatry patient will need to demonstrate increased tolerance and endurance for IPR level of therapy. Will also need to confirm, 24/7 support. TCC will follow.

## 2022-06-07 NOTE — CARE PLAN
The patient is Stable - Low risk of patient condition declining or worsening    Shift Goals  Clinical Goals: Q2 turns, wound management, BP monitoring  Patient Goals: Sleep  Family Goals: N/A    Progress made toward(s) clinical / shift goals:      Patient is not progressing towards the following goals: Pt complaining og 6/10 pain but declines intervention. Dressing on R leg changed. Low FSBG this am, hypoglycemia protocol initiated.       Problem: Skin Integrity  Goal: Skin integrity is maintained or improved  Outcome: Not Progressing     Problem: Discharge Barriers/Planning  Goal: Patient's continuum of care needs are met  Outcome: Not Progressing

## 2022-06-07 NOTE — PROGRESS NOTES
Assumed care of patient at 0700 from Akua MONTENEGRO. Patient is A&Ox 4, states pain level is 6/10, declines intervention. Bed locked in lowest position with 3 rails up. Call light in place, belongings at bedside. Patient expresses zero needs at this time and hourly rounding is in place. Bed alarm on for moderate fall risk.

## 2022-06-07 NOTE — PROGRESS NOTES
Hospital Medicine Daily Progress Note    Date of Service  6/7/2022    Chief Complaint  Jama Altman is a 66 y.o. male admitted 5/24/2022 with AMS and fall    Hospital Course  66 y.o. male w/ HTN, DLD, diabetes,  polycystic kidney dz with a renal transplant 9/10/10 and on immunosuppression but has CKD, ADELFO who presented 5/24/2022 with altered mentation.  There was initial concern that he had fallen at home and struck his head.  In the ER he was in atrial fibrillation with a rapid rate of 120-160.  He was given a fluid bolus and initiated on norepinephrine.  He was admitted for severe septic shock likely related to a soft tissue infection of the right leg from where he bumped into something several days ago. During admit he was found to have E.coli bacteremia and right leg cellulitis.  Spinal fluid ws negative for meningitis.  On 5/26 the patient had respiratory distress and was intubated. On 5/28 the patient had PEA arrest and had CPR.  A right knee aspiration was performed and fluid analysis was not showing infection.  On 6/1 the patient was extubated.  The patient had CRRT initiated on 5/27 and daily HD on 5/28 per nephrology notes. Patient shows sign of recovery and has been off dialysis for a few days.  E. coli bacteremia, source likely RLE cellulitis. Completed iv meropenem with stop date 6/6/2022. ID consulted.     Interval Problem Update  Seen patient at bedside.   Renal function continues to improve  On LR 75cc/hr  Nephrology following  Noted BG 61 this am, adjusted Lantus  Pending PMR    Consultants/Specialty  Infectious disease  Critical care medicine  Nephrology    Code Status  Full Code    Disposition  Pending at this time, Rehab vs. SNF    Review of Systems  All systems reviewed and negative except as noted per above.    Physical Exam  Temp:  [36.9 °C (98.4 °F)-37.1 °C (98.8 °F)] 36.9 °C (98.4 °F)  Pulse:  [] 62  Resp:  [18-19] 18  BP: ()/(45-65) 100/65  SpO2:  [90 %-95 %] 95 %    General  appearance: NAD, conversant, family at bedside  Eyes: anicteric sclerae, moist conjunctivae; no lid-lag; PERRLA  HENT: Atraumatic; oropharynx clear with moist mucous membranes and no mucosal ulcerations; normal hard and soft palate  Neck: Trachea midline; FROM, supple, no thyromegaly or lymphadenopathy  Lungs: CTA, with normal respiratory effort and no intercostal retractions  CV: RRR, no MRGs  Abdomen: Soft, non-tender; no masses or HSM  Extremities: RLE erythema with dressing noted  Skin: Normal temperature, turgor and texture; no rash, ulcers or subcutaneous nodules  Psych: Answering questions      Current Facility-Administered Medications:   •  insulin GLARGINE (Lantus,Semglee) injection, 25 Units, Subcutaneous, BID, Timo Muñoz M.D.  •  lactated ringers infusion, , Intravenous, Continuous, Roderick Ramirez M.D., Last Rate: 75 mL/hr at 06/07/22 0409, New Bag at 06/07/22 0409  •  insulin regular (HumuLIN R,NovoLIN R) injection, 3-14 Units, Subcutaneous, 4X/DAY ACHS **AND** POC blood glucose manual result, , , Q AC AND BEDTIME(S) **AND** NOTIFY MD and PharmD, , , Once **AND** Administer 20 grams of glucose (approximately 8 ounces of fruit juice) every 15 minutes PRN FSBG less than 70 mg/dL, , , PRN **AND** dextrose 50% (D50W) injection 25 g, 25 g, Intravenous, Q15 MIN PRN, Linsey M Wegener, A.P.R.N.  •  acetaminophen (Tylenol) tablet 650 mg, 650 mg, Oral, Q6HRS PRN, Mehrdad Laughlin M.D., 650 mg at 06/05/22 1000  •  tacrolimus (PROGRAF) capsule 1 mg, 1 mg, Oral, BID, Mehrdad Laughlin M.D., 1 mg at 06/07/22 0409  •  senna-docusate (PERICOLACE or SENOKOT S) 8.6-50 MG per tablet 2 Tablet, 2 Tablet, Oral, BID, 2 Tablet at 06/07/22 0409 **AND** polyethylene glycol/lytes (MIRALAX) PACKET 1 Packet, 1 Packet, Oral, QDAY PRN **AND** magnesium hydroxide (MILK OF MAGNESIA) suspension 30 mL, 30 mL, Oral, QDAY PRN **AND** bisacodyl (DULCOLAX) suppository 10 mg, 10 mg, Rectal, QDAY PRN, Mehrdad Laughlin M.D.  •  mycophenolate  (CELLCEPT) capsule 500 mg, 500 mg, Oral, BID, Mehrdad Laughlin M.D., 500 mg at 06/07/22 0409  •  midodrine (PROAMATINE) tablet 5 mg, 5 mg, Oral, TID WITH MEALS, Mehrdad Laughlin M.D., 5 mg at 06/06/22 1743  •  oxyCODONE immediate-release (ROXICODONE) tablet 5 mg, 5 mg, Oral, Q4HRS PRN, Mehrdad Laughlin M.D.  •  predniSONE (DELTASONE) tablet 5 mg, 5 mg, Oral, DAILY, Mehrdad Laughlin M.D., 5 mg at 06/07/22 0409  •  heparin injection 5,000 Units, 5,000 Units, Subcutaneous, Q12HRS, James Titus M.D., 5,000 Units at 06/07/22 0409  •  miconazole 2%-zinc oxide (Mabel) topical cream, , Topical, BID, Jeremy M Gonda, M.D., Given at 06/07/22 0409  •  heparin injection 2,800 Units, 2,800 Units, Intracatheter, DIALYSIS PRN, James Sheppard M.D., 2,800 Units at 06/02/22 1400  •  Respiratory Therapy Consult, , Nebulization, Continuous RT, Reuben Swartz M.D.  •  sodium chloride (OCEAN) 0.65 % nasal spray 2 Spray, 2 Spray, Nasal, Q2HRS PRN, Kayy Hopkins M.D.  •  dakins 0.125% (1/4 strength) topical soln, , Topical, BID, Reuben Swartz M.D., 473 mL at 06/06/22 2116  •  HYDROmorphone (Dilaudid) injection 0.5 mg, 0.5 mg, Intravenous, Q2HRS PRN, Tracey Whitfield M.D., 0.5 mg at 05/31/22 0059      Fluids    Intake/Output Summary (Last 24 hours) at 6/7/2022 1045  Last data filed at 6/7/2022 0600  Gross per 24 hour   Intake 1298.75 ml   Output 2800 ml   Net -1501.25 ml       Laboratory  Recent Labs     06/05/22  0841 06/07/22  0329   WBC 12.9* 12.5*   RBC 4.84 4.43*   HEMOGLOBIN 14.1 12.6*   HEMATOCRIT 44.3 40.4*   MCV 91.5 91.2   MCH 29.1 28.4   MCHC 31.8* 31.2*   RDW 49.5 50.0   PLATELETCT 180 233   MPV 12.4 11.6     Recent Labs     06/04/22  1133 06/05/22  0841 06/07/22 0329   SODIUM 143 141 143   POTASSIUM 3.8 4.4 4.5   CHLORIDE 102 102 107   CO2 26 27 26   GLUCOSE 208* 153* 51*   * 89* 71*   CREATININE 2.87* 2.44* 2.08*   CALCIUM 7.9* 7.7* 8.0*                   Imaging  DX-ABDOMEN FOR TUBE PLACEMENT   Final Result       Enteric feeding tube terminates with the tip projecting over the expected location of the 2nd-3rd portion of the duodenum.      DX-CHEST-LIMITED (1 VIEW)   Final Result      1.  Bibasilar underinflation atelectasis which could obscure an additional process. This is unchanged.   2.  Interstitial opacities likely pulmonary edema   3.  Persistently enlarged cardiac silhouette      DX-CHEST-LIMITED (1 VIEW)   Final Result      1.  Hypoinflation with mildly increased left basilar atelectasis.   2.  Removal of endotracheal tube.      DX-CHEST-LIMITED (1 VIEW)   Final Result         No significant interval change.      DX-CHEST-LIMITED (1 VIEW)   Final Result      Stable areas of patchy atelectasis/consolidation.      DX-CHEST-PORTABLE (1 VIEW)   Final Result      Stable chest x-ray findings.      DX-CHEST-PORTABLE (1 VIEW)   Final Result         1.  Pulmonary edema and/or infiltrates, somewhat increased particularly in the right lung base compared to prior study.   2.  Cardiomegaly      CT-ABDOMEN-PELVIS W/O   Final Result         Limited noncontrast exam      1. Long segment wall thickening from the descending colon to the sigmoid colon could relate to underdistention or colitis. Air-fluid level in the more proximal colon is likely diarrheal disease.      2. Right lower quadrant transplant kidney with retained prior contrast, likely secondary to renal failure/ATN/contrast nephropathy. No hydronephrosis.      Absent right kidney. Polycystic left kidney.      CT-EXTREMITY, LOWER W/O RIGHT   Final Result      1. Postsurgical changes of right total knee arthroplasty.   2. Right knee joint effusion with thickening of the joint capsule and surrounding soft tissue edema.   3. Circumferential edema involving the soft tissues of the right lower leg with an anterior soft tissue defect and with areas of clustering on the medial right lower leg skin surface.   4. Arteriosclerosis.      DX-CHEST-PORTABLE (1 VIEW)   Final Result       1.  Unchanged position of supporting devices are visualized extent   2.  No visible pneumothorax following chest compressions   3.  Unchanged atelectasis and possible superimposed interstitial pulmonary edema or atypical pneumonia      US-EXTREMITY ARTERY LOWER UNILAT RIGHT   Final Result      US-MIRELLA SINGLE LEVEL BILAT   Final Result      US-EXTREMITY ARTERY LOWER BILAT W/MIRELLA (COMBO)   Final Result      DX-CHEST-PORTABLE (1 VIEW)   Final Result         1.  Pulmonary edema and/or infiltrates, somewhat increased particularly in the right lung base compared to prior study.   2.  Cardiomegaly      DX-ABDOMEN FOR TUBE PLACEMENT   Final Result      1. The tip of the feeding tube terminates over the junction of the gastric pylorus and duodenal bulb.   2. The remainder is stable.      DX-CHEST-PORTABLE (1 VIEW)   Final Result      1. Interval decrease in size of the right pleural effusion.   2. No left pleural effusion.   3. No visible pneumothorax status post bronchoscopy.   4. Interval placement of a Dobbhoff feeding tube.   5. Improving parenchymal loss in the right lung base, incompletely resolved.   6. The remainder is stable.      DX-CHEST-PORTABLE (1 VIEW)   Final Result      1. Interval development of a moderate right pleural effusion after placement of a left internal jugular dialysis catheter, abutting the right lateral wall of the right atrium. Verification of line position with contrast injection under fluoroscopy is    offered.   2. Improved perihilar atelectasis.   3. The remainder is stable.      I, Dr. Matthew Brown, discussed the results of this examination directly by phone with Dr. Reuben Swartz on 5/26/2022 at 0855 hours.      EC-ECHOCARDIOGRAM COMPLETE W/ CONT   Final Result      DX-CHEST-LIMITED (1 VIEW)   Final Result         1. Right internal jugular central venous access catheter placed in the interval terminates over the upper aspect of the right atrium. No postprocedure visible  pneumothorax.   2. Stable patchy parenchymal opacities in the lungs, with a stable small left pleural effusion.      DX-CHEST-PORTABLE (1 VIEW)   Final Result         1.  Pulmonary edema and/or infiltrates are identified, which are stable since the prior exam.   2.  Trace bilateral pleural effusions   3.  Cardiomegaly      CT-EXTREMITY, LOWER W/O RIGHT   Final Result      1.  Status post right total knee replacement.      2.  Diffuse atherosclerotic calcification of the arterial system of the right lower extremity suspicious for diabetes mellitus.      3.  Soft tissue attenuation in the subcutaneous tissues of the mid to distal lower leg and foot suspicious for cellulitis.      4.  No evidence of soft tissue ulceration in the right lower leg or foot.      5.  No evidence of acute osteomyelitis in the right lower leg or foot.      6.  Small subcutaneous abscesses cannot be excluded without the use of intravenous contrast.      DX-CHEST-PORTABLE (1 VIEW)   Final Result      Left internal jugular central venous access catheter terminates over a persistent left superior vena cava. No postprocedure visible pneumothorax.      The remainder is stable.      DX-TIBIA AND FIBULA RIGHT   Final Result      1. Right lower leg soft tissue swelling.   2. No acute fracture or subluxation.   3. Postsurgical changes of right total knee arthroplasty.      US-ABDOMEN F.A.S.T. LTD (FOR ED USE ONLY)   Final Result      No free fluid seen in all 4 quadrants.      Negative FAST scan.            CT-ABDOMEN-PELVIS WITH   Final Result      1. No acute posttraumatic findings in the abdomen or pelvis.   2. Bibasilar subsegmental atelectasis.   3. Right pelvic transplant kidney without hydronephrosis.   4. Polycystic left kidney.   5. Diverticulosis without diverticulitis. Normal appendix.      CT-CTA CHEST PULMONARY ARTERY W/ RECONS   Final Result      1.  No CT evidence of pulmonary emboli.      2.  Bibasilar atelectasis.      3.  No evidence  of rib fracture.      4. Diffuse coronary artery calcification.      CT-HEAD W/O   Final Result      1.  Cerebral atrophy.      2.  Otherwise, Head CT without contrast within normal limits. No evidence of acute cerebral infarction, hemorrhage or mass lesion.         CT-CSPINE WITHOUT PLUS RECONS   Final Result      1.  Moderate osteoarthritic changes at the C6-7 level with disc space narrowing and marginal osteophytosis. Further there is moderate cervical spondylotic changes at this level.      2.  No evidence of cervical spine fracture and/or subluxation.      DX-PELVIS-1 OR 2 VIEWS   Final Result      1.  Unremarkable single AP view of the pelvis.      DX-CHEST-LIMITED (1 VIEW)   Final Result      1.  Curvilinear perihilar opacities likely atelectasis and/or parenchymal scarring.      2.  Mild cardiomegaly.           Assessment/Plan  * Septic shock (HCC)- (present on admission)  Assessment & Plan  Sec to E.coli bacteremia from RLE cellulitis  Completed abx   Resolved    Bacteremia due to Escherichia coli- (present on admission)  Assessment & Plan  Blood culture pos E coli on 5/24. Repeated blood culture negative to date. Source likely sec to RLE cellulitis  Completed 10 days course of IV meropenem (started on 5/28 given new fever and worsening chest x-ray) on 6/6  ID consulted     Acute renal failure superimposed on stage 3a chronic kidney disease ([facility])  Assessment & Plan  Nephrology consulting  Hemodialysis as per nephrology. Has been off HD as renal function improving  Monitor vitals, I/O's, labs  Avoid nephrotoxins.  Immunosuppression for hx of renal transplant    Knee effusion, right  Assessment & Plan  s/p bedside right knee joint aspiration on 5/28.  Synovial fluid analysis reveals hazy priscila fluid, 2638 WBCs with PMN predominance and no crystals seen.  Fluid not consistent with infection.  Culture neg    Cardiac arrest ([facility])  Assessment & Plan  S/p PEA  Monitor labs, vitals.    Acute metabolic  encephalopathy  Assessment & Plan  Improving.  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    Acute respiratory failure with hypoxia (HCC)  Assessment & Plan  Intubated 5/26 and extubated 6/1  Mobilize as able  Respiratory protocol  Supplemental oxygen and wean as able      PAF (paroxysmal atrial fibrillation) (HCC)- (present on admission)  Assessment & Plan  Hx of pAFib , not on OAC at home.   Outpatient metoprolol 25mg daily on hold due to septic shock and borderline hypotension  Reinitiate once able.  Rate controlled currently  Outpatient cardiology/PCP follow up regards OAC use.     Type 2 diabetes mellitus with hyperlipidemia (HCC)- (present on admission)  Assessment & Plan  On lantus and SSI. Holding home po diabetes meds  Hypoglycemic protocol   A1c 10.7    Thrombocytopenia ([facility])- (present on admission)  Assessment & Plan  Resolved        VTE prophylaxis: heparin ppx    I have performed a physical exam and reviewed and updated ROS and Plan today (6/7/2022). In review of yesterday's note (6/6/2022), there are no changes except as documented above.

## 2022-06-07 NOTE — PROGRESS NOTES
[facility] -  PROGRESS NOTE               Author: Roderick Ramirez M.D. Date & Time: 6/7/2022  11:07 AM     HPI:  Patient is a 66 year old male with a PMHx of renal transplant 9/10/2010 for polycystic kidney disease, DM, thrombocytopenia, CKD3b, pafib, covid19 infection, who presents for AMS.  He was brought in activated trauma for fall, CT imaging has been negative, but was in severe septic shock started on pressors and give nfluid boluses.  On broad spectrum abx.  Currently complains of right leg pain but thinks its better.  Confirms his last dose of tacrolimus and cellcept was yesterday.  Currently on levophed    DAILY NEPHROLOGY SUMMARY:  5/24: Consult done  5/25: NAEO, no complaints, feels a bit better, family at bedside  5/26: Decompensated overnight, intubated due to resp. Distress and pressors initiated  5/27: NAEO, no complaints, hemodynamics decompensated and CRRT initiated instead, tolerated well overnight, still on it this AM  5/28: transitioned to daily iHD for now, +13.8L for hospitalization, remains intubated, on pressor support, Tmax 101.1F, WBC 28.9  5/29: tolerated HD, UF 3L 5/28, remains intubated and on pressor support  5/30: Tolerated UF, improved hemodynamics, remains on pressors, remains on mechanical ventilation   5/31: Seen on Dialysis, vasc cath sluggish despite TPA, only able to run   6/1: Tolerated HD, extubated, UOP increasing-2.7L yesterday, net neg 5L  6/2: Intermittent encephalopathy per wife,  2.5L UOP yesterday, soft Bps, feeling stronger today  6/3: low Bps, brisk UOP-2.6L, with minimal PO intake, thirsty, NO UF with HD, labs pending  6/4: Denies pain or SOB.  UOP 1.5L  No new labs  6/5: Denies pain or SOB.  Feels thirsty.  Creatinine down to 2.4.  6/6: Denies pain or SOB.  Feels thirsty.  No new labs today.  UOP at 3.1L  6/7: Denies pain or SOB.  Drinking thickened water.  Creatinine down to 2.    REVIEW OF SYSTEMS:    Limited due to pt communication  "    PMH/PSH/SH/FH:   Reviewed and unchanged since admission note    CURRENT MEDICATIONS:   Reviewed from admission to present day    VS:  VS:  /65   Pulse 62   Temp 36.9 °C (98.4 °F) (Temporal)   Resp 18   Ht 1.956 m (6' 5\")   Wt 120 kg (264 lb 5.3 oz)   SpO2 95%   BMI 31.35 kg/m²   GENERAL: Ill appearing  CV: no pedal edema  RESP:non-labored  GI: Soft  MSK: No joint deformities   SKIN: ecchymoses  NEURO: No tremor  PSYCH: Deferred    Fluids:  In: 1298.8 [P.O.:240; I.V.:1058.8]  Out: 2800     LABS:  Recent Labs     06/04/22  1133 06/05/22  0841 06/07/22  0329   SODIUM 143 141 143   POTASSIUM 3.8 4.4 4.5   CHLORIDE 102 102 107   CO2 26 27 26   GLUCOSE 208* 153* 51*   * 89* 71*   CREATININE 2.87* 2.44* 2.08*   CALCIUM 7.9* 7.7* 8.0*     IMAGING:   All imaging reviewed from admission to present day    IMPRESSION:  # SANKET  - Etiology likely 2/2 ATN  - has received contrast  - RRT dependent since 5/27, now with signs of recovery  # DDKT  - 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 Coli bacteremia/septic shock  - Source unclear, ?colitis on imaging  - Abx on board  - pressor support  # Hyperkalemia, improved  - No ECG changes  # Encephalopathy  - Etiology likely 2/2 hypoperfusion/sepsis  # Acute respiratory failure  - Intubated 5/26  # RLE ulcer  - Trauma  # type 2 DM  # thrombocytopenia  - worsening    PLAN:  -Okay to remove dialysis catheter  -Continue LR at 75cc/hr  -Holding lasix  -tacrolimus 1mg BID, MMF  -Daily labs and weights  -renal formula tube feeds  -Monitor I/O  -Dose all meds per eGFR     Thank you    "

## 2022-06-07 NOTE — DISCHARGE PLANNING
[facility] Rehabilitation Transitional Care Coordination    Referral from:  Dr Laguhlin  Insurance Provider on Facesheet: SCP  Potential Rehab Diagnosis: Debility, encephalopathy    Chart review indicates patient may need on going medical management and may have therapy needs to possibly meet inpatient rehab facility criteria with the goal of returning to community.    D/C support: Lives alone - S/o stays with patient intermittently, friend Faraz might be able to assist. Will need to verify.     Physiatry consultation forwarded per protocol.     Debility, sepsis, encephalopathy - physiatry to consult, TCC will follow.     Thank you for the referral.

## 2022-06-07 NOTE — PROGRESS NOTES
Hypoglycemia Intervention    Hypoglycemia protocol intervention:  Blood glucose 61 at 0741.  Intervention: 8 oz of fruit juice   Repeat blood glucose 92 at 0820.  Intervention: 4 oz of fruit juice   Additional interventions needed: N/A  Dr. Muñoz notified of the above at 0755.

## 2022-06-07 NOTE — DISCHARGE PLANNING
TCN following. HTH/SCP chart review completed. Noted PT consult completed 6/6/22 with recommendations for post acute level of care. Appreciate collaboration with RN Lydia ISIDRO. No new TCN needs identified this day, as RIRF now following and choice for SNF previously obtained.     TCN will continue to follow and collaborate with discharge planning team as appropriate. Thank you.     Previously completed:  - ST 6/3/22 with patient advanced to PO intake of PU4, MT2. OT 6/3/22 with recommendations for post acute level of care prior to return home. PT 6/6/22 with recommendations for post acute level of care prior to return home.   - Choice forms: IRF choice obtained 6/3/22.  Noted RIRF now following.  - GSC introduced (Y), referral (not sent) as patient current discharge disposition is likely post acute level of care

## 2022-06-07 NOTE — THERAPY
Occupational Therapy  Daily Treatment     Patient Name: Jama Altman  Age:  66 y.o., Sex:  male  Medical Record #: [id number]  Today's Date: 6/7/2022       Precautions: Fall Risk, Swallow Precautions ( See Comments)    Assessment    Pt seen for OT tx. Continues to be limited by generalized weakness and decreased activity tolerance impacting ability to complete ADLs and ADL transfers. Encouraged pt to mobilize w/ nrsg staff as tolerated. Pt expressed concerns about standing on bandaged R foot. Provided education and encouragement. Psychosocial intervention addressed regarding current limitations and healing process. Will continue to benefit from OT services while in house.     Plan    Continue current treatment plan.    DC Equipment Recommendations: Unable to determine at this time  Discharge Recommendations: Recommend post-acute placement for additional occupational therapy services prior to discharge home       06/07/22 1015   Cognition    Cognition / Consciousness X   Comments delayed responses and flat affect   Balance   Sitting Balance (Static) Fair -   Sitting Balance (Dynamic) Poor +   Weight Shift Sitting Poor   Bed Mobility    Supine to Sit Moderate Assist   Sit to Supine Minimal Assist   Activities of Daily Living   Grooming Minimal Assist;Seated   Upper Body Dressing Moderate Assist   Lower Body Dressing Maximal Assist   Toileting Maximal Assist   Short Term Goals   Short Term Goal # 1 Pt will complete ADL transfers with mod A   Goal Outcome # 1 Goal not met   Short Term Goal # 2 Pt will complete gown change with supv   Goal Outcome # 2 Goal not met   Short Term Goal # 3 Pt will complete seated grooming with set-up   Goal Outcome # 3 Goal not met   Anticipated Discharge Equipment and Recommendations   DC Equipment Recommendations Unable to determine at this time   Discharge Recommendations Recommend post-acute placement for additional occupational therapy services prior to discharge home

## 2022-06-07 NOTE — CARE PLAN
The patient is Watcher - Medium risk of patient condition declining or worsening    Shift Goals  Clinical Goals: Monitor labs, q2 turns, wound management  Patient Goals: to rest  Family Goals: Daily labs ordered    Progress made toward(s) clinical / shift goals:  vitals are stable    Patient is not progressing towards the following goals:

## 2022-06-07 NOTE — DISCHARGE PLANNING
1400: Bedside RN informed RN CM that patient's brother, Pepe Altman, was at the bedside and asking to speak with RN CM. Met with patient and his brother, Pepe, at the bedside to discuss patient's discharge plan and attempt to answer any additional questions posed. Pepe asked about labs being completed and stated that he had read that nephrology would not be continuing dialysis. Explained that patient has had output in relation to his intake and that labs did not indicate him needing dialysis at this time. Did reassure patient and his brother that nephrology continues to follow and that his labs and I&O were being monitored. Pepe asked if PT/OT was seeing patient daily. Acknowledged that PT/OT were continuing to work with patient and that PMR had been consulted to evaluate patient for rehab and were continuing to monitor for increased endurance and progression. They acknowledged understanding. Pepe asked when the patient's dressing would be changed on his leg. Patient indicated it was changed daily. RN CM reassured patient and his brother that an inquiry would be made regarding timing of dressing change. Pepe indicated he would be assisting the patient with his lunch tray. RN CM discussed with bedside RN, who indicated she was on her way to the patient's room currently to complete the dressing change.

## 2022-06-08 ENCOUNTER — APPOINTMENT (OUTPATIENT)
Dept: RADIOLOGY | Facility: MEDICAL CENTER | Age: 67
DRG: 870 | End: 2022-06-08
Attending: STUDENT IN AN ORGANIZED HEALTH CARE EDUCATION/TRAINING PROGRAM
Payer: MEDICARE

## 2022-06-08 LAB
ANION GAP SERPL CALC-SCNC: 11 MMOL/L (ref 7–16)
BUN SERPL-MCNC: 55 MG/DL (ref 8–22)
CALCIUM SERPL-MCNC: 7.1 MG/DL (ref 8.5–10.5)
CHLORIDE SERPL-SCNC: 112 MMOL/L (ref 96–112)
CK SERPL-CCNC: 35 U/L (ref 0–154)
CO2 SERPL-SCNC: 23 MMOL/L (ref 20–33)
CREAT SERPL-MCNC: 1.68 MG/DL (ref 0.5–1.4)
ERYTHROCYTE [DISTWIDTH] IN BLOOD BY AUTOMATED COUNT: 49.9 FL (ref 35.9–50)
GFR SERPLBLD CREATININE-BSD FMLA CKD-EPI: 45 ML/MIN/1.73 M 2
GLUCOSE BLD STRIP.AUTO-MCNC: 127 MG/DL (ref 65–99)
GLUCOSE BLD STRIP.AUTO-MCNC: 146 MG/DL (ref 65–99)
GLUCOSE BLD STRIP.AUTO-MCNC: 192 MG/DL (ref 65–99)
GLUCOSE BLD STRIP.AUTO-MCNC: 86 MG/DL (ref 65–99)
GLUCOSE SERPL-MCNC: 58 MG/DL (ref 65–99)
HCT VFR BLD AUTO: 35.1 % (ref 42–52)
HGB BLD-MCNC: 11.1 G/DL (ref 14–18)
MCH RBC QN AUTO: 28.7 PG (ref 27–33)
MCHC RBC AUTO-ENTMCNC: 31.6 G/DL (ref 33.7–35.3)
MCV RBC AUTO: 90.7 FL (ref 81.4–97.8)
PHOSPHATE SERPL-MCNC: 3.8 MG/DL (ref 2.5–4.5)
PLATELET # BLD AUTO: 177 K/UL (ref 164–446)
PMV BLD AUTO: 11.6 FL (ref 9–12.9)
POTASSIUM SERPL-SCNC: 4.7 MMOL/L (ref 3.6–5.5)
RBC # BLD AUTO: 3.87 M/UL (ref 4.7–6.1)
SODIUM SERPL-SCNC: 146 MMOL/L (ref 135–145)
WBC # BLD AUTO: 11.9 K/UL (ref 4.8–10.8)

## 2022-06-08 PROCEDURE — 770001 HCHG ROOM/CARE - MED/SURG/GYN PRIV*

## 2022-06-08 PROCEDURE — 700105 HCHG RX REV CODE 258: Performed by: STUDENT IN AN ORGANIZED HEALTH CARE EDUCATION/TRAINING PROGRAM

## 2022-06-08 PROCEDURE — 82550 ASSAY OF CK (CPK): CPT

## 2022-06-08 PROCEDURE — 85027 COMPLETE CBC AUTOMATED: CPT

## 2022-06-08 PROCEDURE — 99232 SBSQ HOSP IP/OBS MODERATE 35: CPT | Performed by: STUDENT IN AN ORGANIZED HEALTH CARE EDUCATION/TRAINING PROGRAM

## 2022-06-08 PROCEDURE — 700111 HCHG RX REV CODE 636 W/ 250 OVERRIDE (IP): Performed by: HOSPITALIST

## 2022-06-08 PROCEDURE — 84100 ASSAY OF PHOSPHORUS: CPT

## 2022-06-08 PROCEDURE — 70450 CT HEAD/BRAIN W/O DYE: CPT | Mod: ME

## 2022-06-08 PROCEDURE — 700111 HCHG RX REV CODE 636 W/ 250 OVERRIDE (IP): Performed by: STUDENT IN AN ORGANIZED HEALTH CARE EDUCATION/TRAINING PROGRAM

## 2022-06-08 PROCEDURE — 97597 DBRDMT OPN WND 1ST 20 CM/<: CPT

## 2022-06-08 PROCEDURE — 700102 HCHG RX REV CODE 250 W/ 637 OVERRIDE(OP): Performed by: HOSPITALIST

## 2022-06-08 PROCEDURE — 82962 GLUCOSE BLOOD TEST: CPT | Mod: 91

## 2022-06-08 PROCEDURE — 80048 BASIC METABOLIC PNL TOTAL CA: CPT

## 2022-06-08 PROCEDURE — A9270 NON-COVERED ITEM OR SERVICE: HCPCS | Performed by: HOSPITALIST

## 2022-06-08 RX ORDER — SODIUM CHLORIDE 450 MG/100ML
INJECTION, SOLUTION INTRAVENOUS CONTINUOUS
Status: DISCONTINUED | OUTPATIENT
Start: 2022-06-08 | End: 2022-06-11

## 2022-06-08 RX ADMIN — HEPARIN SODIUM 5000 UNITS: 5000 INJECTION, SOLUTION INTRAVENOUS; SUBCUTANEOUS at 06:02

## 2022-06-08 RX ADMIN — MYCOPHENOLATE MOFETIL 500 MG: 250 CAPSULE ORAL at 18:12

## 2022-06-08 RX ADMIN — PREDNISONE 5 MG: 5 TABLET ORAL at 05:57

## 2022-06-08 RX ADMIN — HEPARIN SODIUM 5000 UNITS: 5000 INJECTION, SOLUTION INTRAVENOUS; SUBCUTANEOUS at 18:12

## 2022-06-08 RX ADMIN — MIDODRINE HYDROCHLORIDE 5 MG: 5 TABLET ORAL at 09:59

## 2022-06-08 RX ADMIN — TACROLIMUS 1 MG: 1 CAPSULE ORAL at 18:12

## 2022-06-08 RX ADMIN — MICONAZOLE NITRATE: 20 CREAM TOPICAL at 06:02

## 2022-06-08 RX ADMIN — TACROLIMUS 1 MG: 1 CAPSULE ORAL at 05:57

## 2022-06-08 RX ADMIN — DAKIN'S SOLUTION 0.125% (QUARTER STRENGTH) 15 ML: 0.12 SOLUTION at 09:54

## 2022-06-08 RX ADMIN — DAKIN'S SOLUTION 0.125% (QUARTER STRENGTH) 473 ML: 0.12 SOLUTION at 22:33

## 2022-06-08 RX ADMIN — SODIUM CHLORIDE: 4.5 INJECTION, SOLUTION INTRAVENOUS at 12:19

## 2022-06-08 RX ADMIN — MYCOPHENOLATE MOFETIL 500 MG: 250 CAPSULE ORAL at 05:57

## 2022-06-08 ASSESSMENT — PAIN DESCRIPTION - PAIN TYPE
TYPE: ACUTE PAIN

## 2022-06-08 ASSESSMENT — FIBROSIS 4 INDEX: FIB4 SCORE: 1.8

## 2022-06-08 NOTE — DISCHARGE PLANNING
"HTH/SCP TCN chart review completed. Collaborated with DWAINE Freeman. OT/PT recommended post-acute placement. Hospitalist noted on 6/8/22 \" Pending at this time, Rehab vs. SNF...Renal function continues to improve.\". Nephrology Medicine noted on 6/8 Creatinine levels continue to trend down and that mbr still has dialysis catheter with plans to remove dialysis catheter at some point.      Patient seen at bedside. TCN will continue to follow and collaborate with discharge planning team as additional post acute needs arise. Thank you.    Previously completed:  - Choice forms: IRF  Per CM- SNF choice previously obtained ,referrals sent to [facility], [place] , [facility]  - [facility] introduced (Y/N), referral (sent/ not sent).   "

## 2022-06-08 NOTE — WOUND TEAM
[facility]  Inpatient Services  Wound and Skin Care Evaluation    Admission Date: 5/24/2022     Last order of IP CONSULT TO WOUND CARE was found on 5/24/2022 from Hospital Encounter on 5/24/2022     HPI, PMH, SH: Reviewed    Past Surgical History:   Procedure Laterality Date   • KNEE MANIPULATION  2/16/2012    Performed [facility]   • KNEE UNICOMPARTMENTAL  12/23/2011    Performed by LAT[facility] CONNER [facility]   • KNEE ARTHROSCOPY  12/23/2011    Performed by LAT[facility] CONNER [facility]   • KNEE ARTHROSCOPY  5/3/2011    Performed by HANANE GOLDMAN at SURGERY SAME DAY [facility] ORS   • MENISCECTOMY, KNEE, MEDIAL  5/3/2011    Performed by HANANE GOLDMAN at SURGERY SAME DAY [facility] ORS   • OTHER  9/10/10    RIGHT KIDNEY TRANSPLANT   • KNEE ARTHROPLASTY TOTAL  1/12/07    RIGHT   • KNEE ARTHROSCOPY  4/10/06    RIGHT   • OTHER ORTHOPEDIC SURGERY  7/8/74    LEFT KNEE DEBRIDEMENT     Social History     Tobacco Use   • Smoking status: Never Smoker   • Smokeless tobacco: Never Used   Substance Use Topics   • Alcohol use: No     Chief Complaint   Patient presents with   • GLF     Diagnosis: Septic shock (HCC) [A41.9, R65.21]    Unit where seen by Wound Team: S114/00     WOUND CONSULT/FOLLOW UP RELATED TO:  Follow up - RLE wound, L knee    WOUND HISTORY:  Patient presented to ED after ground level fall. Hx type 2 DM, stage III CKD, afib.+ AMS and RLE pain on admission. Pt admitted with sepsis secondary to bacteremia. Source is likely cellulitis. Wound to RLE likely from fall.     6/1 Wound consult received for open blistering to right leg and right foot, intact blister to left knee, and excoriation to buttocks    WOUND ASSESSMENT/LDA  Wound 05/25/22 Full Thickness Wound Pretibial Anterior Right w/ circumferential soft tissue necrosis (Active)   Wound Image      06/08/22 1300   Site Assessment Purple;Fragile;Red;Slough;Painful;Eschar 06/08/22 1300    Periwound Assessment Ecchymosis;Fragile 06/08/22 1300   Margins Undefined edges    Closure Secondary intention    Drainage Amount Small    Drainage Description Serosanguineous    Treatments Cleansed;Site care;CSWD - Conservative Sharp Wound Debridement;Offloading    Wound Cleansing Approved Wound Cleanser    Periwound Protectant Barrier Paste    Dressing Cleansing/Solutions 1/4 Strength Dakin's Solution    Dressing Options Plain Strip Packing;Moist Roll Gauze;Viscopaste;Absorbent Abdominal Pad;Dry Roll Gauze    Dressing Changed Changed    Dressing Status Clean;Dry;Intact    Dressing Change/Treatment Frequency Every Shift, and As Needed    NEXT Dressing Change/Treatment Date 06/08/22    NEXT Weekly Photo (Inpatient Only) 06/15/22    Non-staged Wound Description Full thickness    Tunneling (cm) 2 cm    Tunneling Clock Position of Wound 8    Shape Irregular, numerous    Wound Odor None    Pulses 1+;Right;DP    Exposed Structures JUDITH    WOUND NURSE ONLY - Time Spent with Patient (mins) 50        Wound 05/31/22 Soft Tissue Necrosis Foot Dorsal Right (Active)   Wound Image    06/08/22 1300   Site Assessment Slough;Eschar;Fragile;Painful    Periwound Assessment Purple;Fragile;Pink    Margins Undefined edges    Closure Secondary intention    Drainage Amount Small    Drainage Description Serosanguineous    Treatments Cleansed;CSWD - Conservative Sharp Wound Debridement;Site care;Offloading    Wound Cleansing Approved Wound Cleanser    Periwound Protectant Barrier Paste    Dressing Cleansing/Solutions 1/4 Strength Dakin's Solution    Dressing Options Moist Roll Gauze;Mepilex    Dressing Changed Changed    Dressing Status Clean;Dry;Intact    Dressing Change/Treatment Frequency Every Shift, and As Needed    NEXT Dressing Change/Treatment Date 06/08/22    NEXT Weekly Photo (Inpatient Only) 06/15/22    Wound Length (cm) 4.2 cm    Wound Width (cm) 2 cm    Wound Surface Area (cm^2) 8.4 cm^2    Shape Oval    Wound Odor None   "  Pulses 1+;Right;DP    Exposed Structures JUDITH      Vascular:    MIRELLA:   No results found.    Lab Values:    Lab Results   Component Value Date/Time    WBC 11.9 (H) 06/08/2022 06:11 AM    RBC 3.87 (L) 06/08/2022 06:11 AM    HEMOGLOBIN 11.1 (L) 06/08/2022 06:11 AM    HEMATOCRIT 35.1 (L) 06/08/2022 06:11 AM    CREACTPROT 4.82 (H) 06/03/2022 09:42 AM    SEDRATEWES <1 08/19/2020 07:16 AM    HBA1C 10.7 (H) 05/24/2022 03:25 PM        Culture Results show:  No results found for this or any previous visit (from the past 720 hour(s)).    Pain Level/Medicated:  Intermittent pain during wound care, but tolerated without pharmacological intervention    INTERVENTIONS BY WOUND TEAM:  Chart and images reviewed. Discussed with bedside RN. All areas of concern (based on picture review, LDA review and discussion with bedside RN) have been thoroughly assessed. Documentation of areas based on significant findings. This RN in to assess patient. Performed standard wound care which includes appropriate positioning, dressing removal and non-selective debridement. Pictures and measurements obtained weekly if/when required.  Preparation for Dressing removal: Removed without difficulty  Non-selectively Debrided with:  Cleanser and gauze.  Sharp debridement: Eschar and non viable tissue debrided away using forceps, scalpel, and scalpel. <20 cm2 debrided. Scant bleeding noted, controlled with manual pressure.  Nadeen wound: Cleansed with cleanser and gauze  Primary Dressing:    Left Knee: Dakin's moistened roll gauze   R tibial plateau packing wound: 1/4\" Dakins moistened packing strip   R anterior shin & dorsal foot eschars: Dakin's moistened roll gauze  Secondary Dressing   Left knee: Silicone adhesive foam   RLE: Viscopaste patch to anterior and posterior leg. Then covered with telfa pads (no ABDs available on floor), and wrapped with dry roll gauze.   R foot: Mepilex    Interdisciplinary consultation: Patient, Bedside RN    EVALUATION / " RATIONALE FOR TREATMENT:  Most Recent Date:  6/8/22: RLE wound continuing to evolve, and will likely worsen before improving. Dakins applied over areas with eschar to chemically debride as patient unable to tolerate a lot of CSWD. Viscopaste to periwound as it is extremely fragile and beginning to tear. L knee bullae lanced and serosanguinous fluid expressed, intact skin underlying bullae.    6/1: Right pretibial wound bed now covered almost entirely with slough. Decrease in undermining. Continue with Dakin's packing. Scattered partial thickness wounds now present to RLE circumferentially as well as right foot. Appears to be soft tissue necrosis vs ischemic injury. MD updated. Left knee with dark red and purple discoloration. May evolve similarly to RLE wounds. Left GABBY as skin is still intact. MASD noted to buttocks. Miconazole ordered for antifungal and viscopaste applied. Viscopatch zinc impregnated gauze to encourage re-epithelialization of superficial 100% viable wound bed, and to provide a non-stick wound contact layer.  5/25: RLE edematous and painful to touch. CMS inact. Small, detached nonviable tissue trimmed from distal aspect of wound. Large amount of tan drainage expressed from wound with continued oozing throughout wound care. Plain strip packing packed into undermining area. Dakin's ordered for future changes to chemically debride wound and manage bioburden. Compression applied to help reduce swelling. Wound Team to follow.     Goals: Steady decrease in wound area and depth weekly.    WOUND TEAM PLAN OF CARE ([X] for frequency of wound follow up,):   Nursing to follow orders written for wound care. Contact wound team if area fails to progress, deteriorates or with any questions/concerns  Dressing changes by wound team:                   Follow up 3 times weekly:                NPWT change 3 times weekly:     Follow up 1-2 times weekly:  X weekly    Follow up Bi-Monthly:                   Follow up as  needed:     Other (explain):     NURSING PLAN OF CARE ORDERS (X):  Dressing changes: See Dressing Care orders: X  Skin care: See Skin Care orders:   RN Prevention Protocol:   Rectal tube care: See Rectal Tube Care orders:   Other orders:    RSKIN:   CURRENTLY IN PLACE (X), APPLIED THIS VISIT (A), ORDERED (O):   Q shift Javad:  X  Q shift pressure point assessments:  X    Surface/Positioning   Pressure redistribution mattress            Low Airloss  X        Bariatric foam      Bariatric ORTEGA     Waffle cushion        Waffle Overlay          Reposition q 2 hours      TAPs Turning system     Z Kaleb Pillow     Offloading/Redistribution   Sacral Mepilex (Silicone dressing)     Heel Mepilex (Silicone dressing)         Heel float boots (Prevalon boot)   X          Float Heels off Bed with Pillows   X        Respiratory   Silicone O2 tubing     X    Gray Foam Ear protectors     Cannula fixation Device (Tender )          High flow offloading Clip    Elastic head band offloading device      Anchorfast                                                         Trach with Optifoam split foam             Containment/Moisture Prevention     Rectal tube or BMS  X  Purwick/Condom Cath        Epps Catheter    Barrier wipes           Barrier paste       Antifungal tx      Interdry        Mobilization JUDITH    Up to chair        Ambulate      PT/OT      Nutrition       Dietician        Diabetes Education      PO    TF   X  TPN     NPO   # days     Other        Anticipated discharge plans: TBD  LTACH:        SNF/Rehab:                  Home Health Care:           Outpatient Wound Center:            Self/Family Care:        Other:                  Vac Discharge Needs:   Not Applicable Pt not on a wound vac:  X     Regular Vac while inpatient, alternative dressing at DC:        Regular Vac in use and continued at DC:            Reg. Vac w/ Skin Sub/Biologic in use. Will need to be changed 2x wkly:      Veraflo Vac while inpatient, ok to  transition to Regular Vac on Discharge:           Veraflo Vac while inpatient, will need to remain on Veraflo Vac upon discharge:

## 2022-06-08 NOTE — PROGRESS NOTES
Assumed care of patient at 0700 from Francisca MONTENEGRO. Patient is A&Ox to self. Pupils and motor strength assessed with no deviation from baseline. FSB. Pt slightly tachycardic this am. MD notified. states pain level is 0/10. Bed locked in lowest position with 2 rails up. Call light in place, belongings at bedside. Patient expresses zero needs at this time and hourly rounding is in place. Bed alarm on for high fall risk.

## 2022-06-08 NOTE — PROGRESS NOTES
[facility] -  PROGRESS NOTE               Author: Roderick Ramirez M.D. Date & Time: 6/8/2022  9:38 AM     HPI:  Patient is a 66 year old male with a PMHx of renal transplant 9/10/2010 for polycystic kidney disease, DM, thrombocytopenia, CKD3b, pafib, covid19 infection, who presents for AMS.  He was brought in activated trauma for fall, CT imaging has been negative, but was in severe septic shock started on pressors and give nfluid boluses.  On broad spectrum abx.  Currently complains of right leg pain but thinks its better.  Confirms his last dose of tacrolimus and cellcept was yesterday.  Currently on levophed    DAILY NEPHROLOGY SUMMARY:  5/24: Consult done  5/25: NAEO, no complaints, feels a bit better, family at bedside  5/26: Decompensated overnight, intubated due to resp. Distress and pressors initiated  5/27: NAEO, no complaints, hemodynamics decompensated and CRRT initiated instead, tolerated well overnight, still on it this AM  5/28: transitioned to daily iHD for now, +13.8L for hospitalization, remains intubated, on pressor support, Tmax 101.1F, WBC 28.9  5/29: tolerated HD, UF 3L 5/28, remains intubated and on pressor support  5/30: Tolerated UF, improved hemodynamics, remains on pressors, remains on mechanical ventilation   5/31: Seen on Dialysis, vasc cath sluggish despite TPA, only able to run   6/1: Tolerated HD, extubated, UOP increasing-2.7L yesterday, net neg 5L  6/2: Intermittent encephalopathy per wife,  2.5L UOP yesterday, soft Bps, feeling stronger today  6/3: low Bps, brisk UOP-2.6L, with minimal PO intake, thirsty, NO UF with HD, labs pending  6/4: Denies pain or SOB.  UOP 1.5L  No new labs  6/5: Denies pain or SOB.  Feels thirsty.  Creatinine down to 2.4.  6/6: Denies pain or SOB.  Feels thirsty.  No new labs today.  UOP at 3.1L  6/7: Denies pain or SOB.  Drinking thickened water.  Creatinine down to 2.  6/8: Creatinine continues to trend down.  Still has  "dialysis catheter in place    REVIEW OF SYSTEMS:    Limited due to pt communication     PMH/PSH/SH/FH:   Reviewed and unchanged since admission note    CURRENT MEDICATIONS:   Reviewed from admission to present day    VS:  VS:  /69   Pulse (!) 103   Temp 37.3 °C (99.2 °F) (Temporal)   Resp 20   Ht 1.956 m (6' 5\")   Wt 120 kg (264 lb 5.3 oz)   SpO2 94%   BMI 31.35 kg/m²   GENERAL: Ill appearing  CV: no pedal edema  RESP:non-labored  GI: Soft  MSK: No joint deformities   SKIN: ecchymoses  NEURO: No tremor  PSYCH: Deferred    Fluids:  In: 477 [P.O.:477]  Out: 1350     LABS:  Recent Labs     06/07/22  0329 06/08/22  0611   SODIUM 143 146*   POTASSIUM 4.5 4.7   CHLORIDE 107 112   CO2 26 23   GLUCOSE 51* 58*   BUN 71* 55*   CREATININE 2.08* 1.68*   CALCIUM 8.0* 7.1*     IMAGING:   All imaging reviewed from admission to present day    IMPRESSION:  # SANKET  - Etiology likely 2/2 ATN  - has received contrast  - RRT dependent since 5/27, now with signs of recovery  # DDKT  - 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 Coli bacteremia/septic shock  - Source unclear, ?colitis on imaging  - Abx on board  - pressor support  # Hyperkalemia, improved  - No ECG changes  # Encephalopathy  - Etiology likely 2/2 hypoperfusion/sepsis  # Acute respiratory failure  - Intubated 5/26  # RLE ulcer  - Trauma  # type 2 DM  # thrombocytopenia  - worsening    PLAN:  -Please remove dialysis catheter  -Change to 1/2NS at 50cc/hr  -Holding lasix  -tacrolimus 1mg BID, MMF  -Daily labs and weights  -renal formula tube feeds  -Monitor I/O  -Dose all meds per eGFR     Thank you    "

## 2022-06-08 NOTE — CARE PLAN
The patient is Stable - Low risk of patient condition declining or worsening    Shift Goals  Clinical Goals: q2 turns, wound changes, encourage po intake, maintain skin integrity  Patient Goals: rest  Family Goals: N/A    Progress made toward(s) clinical / shift goals:  POC/medications discussed with pt. Pt reported pain of RLE with movement. Declines prn pain medications at this time. Wound care and dressing done, barrier cream applied, q2 turns and incontinence linen changed to maintain skin integrity and promote skin healing. All fall precautions in place. Bed alarm is on.       Problem: Knowledge Deficit - Standard  Goal: Patient and family/care givers will demonstrate understanding of plan of care, disease process/condition, diagnostic tests and medications  Outcome: Progressing     Problem: Pain - Standard  Goal: Alleviation of pain or a reduction in pain to the patient’s comfort goal  Outcome: Progressing     Problem: Skin Integrity  Goal: Skin integrity is maintained or improved  Outcome: Progressing     Problem: Fall Risk  Goal: Patient will remain free from falls  Outcome: Progressing     Problem: Discharge Barriers/Planning  Goal: Patient's continuum of care needs are met  Outcome: Progressing     Problem: Mobility  Goal: Patient's capacity to carry out activities will improve  Outcome: Progressing     Problem: Wound/ / Incision Healing  Goal: Patient's wound/surgical incision will decrease in size and heals properly  Outcome: Progressing

## 2022-06-08 NOTE — PROGRESS NOTES
Hospital Medicine Daily Progress Note    Date of Service  6/8/2022    Chief Complaint  Jama Altman is a 66 y.o. male admitted 5/24/2022 with AMS and fall    Hospital Course  66 y.o. male w/ HTN, DLD, diabetes,  polycystic kidney dz with a renal transplant 9/10/10 and on immunosuppression but has CKD, ADELFO who presented 5/24/2022 with altered mentation.  There was initial concern that he had fallen at home and struck his head.  In the ER he was in atrial fibrillation with a rapid rate of 120-160.  He was given a fluid bolus and initiated on norepinephrine.  He was admitted for severe septic shock likely related to a soft tissue infection of the right leg from where he bumped into something several days ago. During admit he was found to have E.coli bacteremia and right leg cellulitis.  Spinal fluid ws negative for meningitis.  On 5/26 the patient had respiratory distress and was intubated. On 5/28 the patient had PEA arrest and had CPR.  A right knee aspiration was performed and fluid analysis was not showing infection.  On 6/1 the patient was extubated.  The patient had CRRT initiated on 5/27 and daily HD on 5/28 per nephrology notes. Patient shows sign of recovery and has been off dialysis for a few days.  E. coli bacteremia, source likely RLE cellulitis. Completed iv meropenem with stop date 6/6/2022. ID consulted.     Interval Problem Update  Seen patient at bedside.   Renal function continues to improve. Change to 1/2 NS. Na 146.   To remove HD catheter  Nephrology following  BG 86 this am  Dc midodrine  Rehab evaluated the patient yesterday and patient will need to be able to tolerate 3 hours of therapy 5 days a week, and have discharge support    Consultants/Specialty  Infectious disease  Critical care medicine  Nephrology    Code Status  Full Code    Disposition  Pending at this time, Rehab vs. SNF    Review of Systems  All systems reviewed and negative except as noted per above.    Physical Exam  Temp:   [36.4 °C (97.6 °F)-37.7 °C (99.9 °F)] 37.3 °C (99.2 °F)  Pulse:  [] 103  Resp:  [18-20] 20  BP: (112-122)/(53-73) 120/69  SpO2:  [90 %-98 %] 94 %    General appearance: NAD, conversant, family at bedside  Eyes: anicteric sclerae, moist conjunctivae; no lid-lag; PERRLA, noted subconjunctival hemorrhage, pronounce on R eye   HENT: Atraumatic; oropharynx clear with moist mucous membranes and no mucosal ulcerations; normal hard and soft palate  Neck: Trachea midline; FROM, supple, no thyromegaly or lymphadenopathy  Lungs: CTA, with normal respiratory effort and no intercostal retractions  CV: RRR, no MRGs  Abdomen: Soft, non-tender; no masses or HSM  Extremities: RLE erythema with dressing noted  Skin: Normal temperature, turgor and texture; no rash, ulcers or subcutaneous nodules  Psych: Answering questions      Current Facility-Administered Medications:   •  1/2 NS infusion, , Intravenous, Continuous, Roderick Ramirez M.D.  •  insulin GLARGINE (Lantus,Semglee) injection, 25 Units, Subcutaneous, BID, Timo Muñoz M.D.  •  insulin regular (HumuLIN R,NovoLIN R) injection, 3-14 Units, Subcutaneous, 4X/DAY ACHS **AND** POC blood glucose manual result, , , Q AC AND BEDTIME(S) **AND** NOTIFY MD and PharmD, , , Once **AND** Administer 20 grams of glucose (approximately 8 ounces of fruit juice) every 15 minutes PRN FSBG less than 70 mg/dL, , , PRN **AND** dextrose 50% (D50W) injection 25 g, 25 g, Intravenous, Q15 MIN PRN, Linsey M Wegener, A.P.R.N.  •  acetaminophen (Tylenol) tablet 650 mg, 650 mg, Oral, Q6HRS PRN, Mehrdad Laughlin M.D., 650 mg at 06/05/22 1000  •  tacrolimus (PROGRAF) capsule 1 mg, 1 mg, Oral, BID, Mehrdad Laughlin M.D., 1 mg at 06/08/22 0557  •  senna-docusate (PERICOLACE or SENOKOT S) 8.6-50 MG per tablet 2 Tablet, 2 Tablet, Oral, BID, 2 Tablet at 06/07/22 0409 **AND** polyethylene glycol/lytes (MIRALAX) PACKET 1 Packet, 1 Packet, Oral, QDAY PRN **AND** magnesium hydroxide (MILK OF MAGNESIA) suspension 30  mL, 30 mL, Oral, QDAY PRN **AND** bisacodyl (DULCOLAX) suppository 10 mg, 10 mg, Rectal, QDAY PRN, Mehrdad Laughlin M.D.  •  mycophenolate (CELLCEPT) capsule 500 mg, 500 mg, Oral, BID, Mehrdad Laughlin M.D., 500 mg at 06/08/22 0557  •  midodrine (PROAMATINE) tablet 5 mg, 5 mg, Oral, TID WITH MEALS, Mehrdad Laughlin M.D., 5 mg at 06/08/22 0959  •  oxyCODONE immediate-release (ROXICODONE) tablet 5 mg, 5 mg, Oral, Q4HRS PRN, Mehrdad Laughlin M.D.  •  predniSONE (DELTASONE) tablet 5 mg, 5 mg, Oral, DAILY, Mehrdad Laughlin M.D., 5 mg at 06/08/22 0557  •  heparin injection 5,000 Units, 5,000 Units, Subcutaneous, Q12HRS, James Titus M.D., 5,000 Units at 06/08/22 0602  •  miconazole 2%-zinc oxide (Mabel) topical cream, , Topical, BID, Jeremy M Gonda, M.D., Given at 06/08/22 0602  •  heparin injection 2,800 Units, 2,800 Units, Intracatheter, DIALYSIS PRN, James Sheppard M.D., 2,800 Units at 06/02/22 1400  •  Respiratory Therapy Consult, , Nebulization, Continuous RT, Reuben Swartz M.D.  •  sodium chloride (OCEAN) 0.65 % nasal spray 2 Spray, 2 Spray, Nasal, Q2HRS PRN, Kayy Hopkins M.D.  •  dakins 0.125% (1/4 strength) topical soln, , Topical, BID, Timo Muñoz M.D., 15 mL at 06/08/22 0954  •  HYDROmorphone (Dilaudid) injection 0.5 mg, 0.5 mg, Intravenous, Q2HRS PRN, Tracey Whitfield M.D., 0.5 mg at 05/31/22 0059      Fluids    Intake/Output Summary (Last 24 hours) at 6/8/2022 1149  Last data filed at 6/8/2022 0956  Gross per 24 hour   Intake 597 ml   Output 1350 ml   Net -753 ml       Laboratory  Recent Labs     06/07/22  0329 06/08/22  0611   WBC 12.5* 11.9*   RBC 4.43* 3.87*   HEMOGLOBIN 12.6* 11.1*   HEMATOCRIT 40.4* 35.1*   MCV 91.2 90.7   MCH 28.4 28.7   MCHC 31.2* 31.6*   RDW 50.0 49.9   PLATELETCT 233 177   MPV 11.6 11.6     Recent Labs     06/07/22  0329 06/08/22  0611   SODIUM 143 146*   POTASSIUM 4.5 4.7   CHLORIDE 107 112   CO2 26 23   GLUCOSE 51* 58*   BUN 71* 55*   CREATININE 2.08* 1.68*   CALCIUM 8.0* 7.1*                    Imaging  CT-HEAD W/O   Final Result      1.  Cerebral atrophy.      2.  Bilateral mastoid effusions.      3.  Otherwise, Head CT without contrast within normal limits. No evidence of acute intracranial hemorrhage or mass lesion.         DX-ABDOMEN FOR TUBE PLACEMENT   Final Result      Enteric feeding tube terminates with the tip projecting over the expected location of the 2nd-3rd portion of the duodenum.      DX-CHEST-LIMITED (1 VIEW)   Final Result      1.  Bibasilar underinflation atelectasis which could obscure an additional process. This is unchanged.   2.  Interstitial opacities likely pulmonary edema   3.  Persistently enlarged cardiac silhouette      DX-CHEST-LIMITED (1 VIEW)   Final Result      1.  Hypoinflation with mildly increased left basilar atelectasis.   2.  Removal of endotracheal tube.      DX-CHEST-LIMITED (1 VIEW)   Final Result         No significant interval change.      DX-CHEST-LIMITED (1 VIEW)   Final Result      Stable areas of patchy atelectasis/consolidation.      DX-CHEST-PORTABLE (1 VIEW)   Final Result      Stable chest x-ray findings.      DX-CHEST-PORTABLE (1 VIEW)   Final Result         1.  Pulmonary edema and/or infiltrates, somewhat increased particularly in the right lung base compared to prior study.   2.  Cardiomegaly      CT-ABDOMEN-PELVIS W/O   Final Result         Limited noncontrast exam      1. Long segment wall thickening from the descending colon to the sigmoid colon could relate to underdistention or colitis. Air-fluid level in the more proximal colon is likely diarrheal disease.      2. Right lower quadrant transplant kidney with retained prior contrast, likely secondary to renal failure/ATN/contrast nephropathy. No hydronephrosis.      Absent right kidney. Polycystic left kidney.      CT-EXTREMITY, LOWER W/O RIGHT   Final Result      1. Postsurgical changes of right total knee arthroplasty.   2. Right knee joint effusion with thickening of the joint  capsule and surrounding soft tissue edema.   3. Circumferential edema involving the soft tissues of the right lower leg with an anterior soft tissue defect and with areas of clustering on the medial right lower leg skin surface.   4. Arteriosclerosis.      DX-CHEST-PORTABLE (1 VIEW)   Final Result      1.  Unchanged position of supporting devices are visualized extent   2.  No visible pneumothorax following chest compressions   3.  Unchanged atelectasis and possible superimposed interstitial pulmonary edema or atypical pneumonia      US-EXTREMITY ARTERY LOWER UNILAT RIGHT   Final Result      US-MIRELLA SINGLE LEVEL BILAT   Final Result      US-EXTREMITY ARTERY LOWER BILAT W/MIRELLA (COMBO)   Final Result      DX-CHEST-PORTABLE (1 VIEW)   Final Result         1.  Pulmonary edema and/or infiltrates, somewhat increased particularly in the right lung base compared to prior study.   2.  Cardiomegaly      DX-ABDOMEN FOR TUBE PLACEMENT   Final Result      1. The tip of the feeding tube terminates over the junction of the gastric pylorus and duodenal bulb.   2. The remainder is stable.      DX-CHEST-PORTABLE (1 VIEW)   Final Result      1. Interval decrease in size of the right pleural effusion.   2. No left pleural effusion.   3. No visible pneumothorax status post bronchoscopy.   4. Interval placement of a Dobbhoff feeding tube.   5. Improving parenchymal loss in the right lung base, incompletely resolved.   6. The remainder is stable.      DX-CHEST-PORTABLE (1 VIEW)   Final Result      1. Interval development of a moderate right pleural effusion after placement of a left internal jugular dialysis catheter, abutting the right lateral wall of the right atrium. Verification of line position with contrast injection under fluoroscopy is    offered.   2. Improved perihilar atelectasis.   3. The remainder is stable.      I, Dr. Matthew Brown, discussed the results of this examination directly by phone with Dr. Reuben Swartz on  5/26/2022 at 0855 hours.      EC-ECHOCARDIOGRAM COMPLETE W/ CONT   Final Result      DX-CHEST-LIMITED (1 VIEW)   Final Result         1. Right internal jugular central venous access catheter placed in the interval terminates over the upper aspect of the right atrium. No postprocedure visible pneumothorax.   2. Stable patchy parenchymal opacities in the lungs, with a stable small left pleural effusion.      DX-CHEST-PORTABLE (1 VIEW)   Final Result         1.  Pulmonary edema and/or infiltrates are identified, which are stable since the prior exam.   2.  Trace bilateral pleural effusions   3.  Cardiomegaly      CT-EXTREMITY, LOWER W/O RIGHT   Final Result      1.  Status post right total knee replacement.      2.  Diffuse atherosclerotic calcification of the arterial system of the right lower extremity suspicious for diabetes mellitus.      3.  Soft tissue attenuation in the subcutaneous tissues of the mid to distal lower leg and foot suspicious for cellulitis.      4.  No evidence of soft tissue ulceration in the right lower leg or foot.      5.  No evidence of acute osteomyelitis in the right lower leg or foot.      6.  Small subcutaneous abscesses cannot be excluded without the use of intravenous contrast.      DX-CHEST-PORTABLE (1 VIEW)   Final Result      Left internal jugular central venous access catheter terminates over a persistent left superior vena cava. No postprocedure visible pneumothorax.      The remainder is stable.      DX-TIBIA AND FIBULA RIGHT   Final Result      1. Right lower leg soft tissue swelling.   2. No acute fracture or subluxation.   3. Postsurgical changes of right total knee arthroplasty.      US-ABDOMEN F.A.S.T. LTD (FOR ED USE ONLY)   Final Result      No free fluid seen in all 4 quadrants.      Negative FAST scan.            CT-ABDOMEN-PELVIS WITH   Final Result      1. No acute posttraumatic findings in the abdomen or pelvis.   2. Bibasilar subsegmental atelectasis.   3. Right  pelvic transplant kidney without hydronephrosis.   4. Polycystic left kidney.   5. Diverticulosis without diverticulitis. Normal appendix.      CT-CTA CHEST PULMONARY ARTERY W/ RECONS   Final Result      1.  No CT evidence of pulmonary emboli.      2.  Bibasilar atelectasis.      3.  No evidence of rib fracture.      4. Diffuse coronary artery calcification.      CT-HEAD W/O   Final Result      1.  Cerebral atrophy.      2.  Otherwise, Head CT without contrast within normal limits. No evidence of acute cerebral infarction, hemorrhage or mass lesion.         CT-CSPINE WITHOUT PLUS RECONS   Final Result      1.  Moderate osteoarthritic changes at the C6-7 level with disc space narrowing and marginal osteophytosis. Further there is moderate cervical spondylotic changes at this level.      2.  No evidence of cervical spine fracture and/or subluxation.      DX-PELVIS-1 OR 2 VIEWS   Final Result      1.  Unremarkable single AP view of the pelvis.      DX-CHEST-LIMITED (1 VIEW)   Final Result      1.  Curvilinear perihilar opacities likely atelectasis and/or parenchymal scarring.      2.  Mild cardiomegaly.           Assessment/Plan  * Septic shock (HCC)- (present on admission)  Assessment & Plan  Sec to E.coli bacteremia from RLE cellulitis  Completed abx   Resolved    Bacteremia due to Escherichia coli- (present on admission)  Assessment & Plan  Blood culture pos E coli on 5/24. Repeated blood culture negative to date. Source likely sec to RLE cellulitis  Completed 10 days course of IV meropenem (started on 5/28 given new fever and worsening chest x-ray) on 6/6  ID consulted     Acute renal failure superimposed on stage 3a chronic kidney disease (HCC)  Assessment & Plan  Nephrology consulting  Hemodialysis as per nephrology. Has been off HD as renal function improving  Monitor vitals, I/O's, labs  Avoid nephrotoxins.  Immunosuppression for hx of renal transplant    Knee effusion, right  Assessment & Plan  s/p bedside  right knee joint aspiration on 5/28.  Synovial fluid analysis reveals hazy priscila fluid, 2638 WBCs with PMN predominance and no crystals seen.  Fluid not consistent with infection.  Culture neg    Cardiac arrest ([facility])  Assessment & Plan  S/p PEA  Monitor labs, vitals.    Acute metabolic encephalopathy  Assessment & Plan  Improving.  Monitor neurochecks  Treating infection as likely etiology    Immunosuppression due to chronic steroid use ([facility])  Assessment & Plan  Restart immunosuppression as per nephrology  On antibiotics for infection.    Acute respiratory failure with hypoxia ([facility])  Assessment & Plan  Intubated 5/26 and extubated 6/1  Mobilize as able  Respiratory protocol  Supplemental oxygen and wean as able      PAF (paroxysmal atrial fibrillation) ([facility])- (present on admission)  Assessment & Plan  Hx of pAFib , not on OAC at home.   Outpatient metoprolol 25mg daily on hold due to septic shock and borderline hypotension  Reinitiate once able.  Rate controlled currently  Outpatient cardiology/PCP follow up regards OAC use.     Type 2 diabetes mellitus with hyperlipidemia ([facility])- (present on admission)  Assessment & Plan  On lantus and SSI. Holding home po diabetes meds  Hypoglycemic protocol   A1c 10.7    Thrombocytopenia ([facility])- (present on admission)  Assessment & Plan  Resolved        VTE prophylaxis: heparin ppx    I have performed a physical exam and reviewed and updated ROS and Plan today (6/8/2022). In review of yesterday's note (6/7/2022), there are no changes except as documented above.

## 2022-06-08 NOTE — CARE PLAN
"The patient is Stable - Low risk of patient condition declining or worsening    Shift Goals  Clinical Goals: Monitor HR, mentation  Patient Goals: Rest  Family Goals: N/A    Progress made toward(s) clinical / shift goals:      Patient is not progressing towards the following goals: Pt A&Ox1 this morning upon initial assessment, MD notified of RN concern. R eye sclera increase in redness noted. CT scan ordered. Improvement in mentation upon reassessment at A7Ox4, neuro status assessed at baseline. Per family member: confirm that pt \"is a bit off today\".      Problem: Knowledge Deficit - Standard  Goal: Patient and family/care givers will demonstrate understanding of plan of care, disease process/condition, diagnostic tests and medications  Outcome: Not Progressing     Problem: Mobility  Goal: Patient's capacity to carry out activities will improve  Outcome: Not Progressing     "

## 2022-06-09 LAB
ANION GAP SERPL CALC-SCNC: 13 MMOL/L (ref 7–16)
BUN SERPL-MCNC: 56 MG/DL (ref 8–22)
CALCIUM SERPL-MCNC: 8.3 MG/DL (ref 8.5–10.5)
CHLORIDE SERPL-SCNC: 109 MMOL/L (ref 96–112)
CO2 SERPL-SCNC: 22 MMOL/L (ref 20–33)
CREAT SERPL-MCNC: 1.84 MG/DL (ref 0.5–1.4)
ERYTHROCYTE [DISTWIDTH] IN BLOOD BY AUTOMATED COUNT: 50 FL (ref 35.9–50)
GFR SERPLBLD CREATININE-BSD FMLA CKD-EPI: 40 ML/MIN/1.73 M 2
GLUCOSE BLD STRIP.AUTO-MCNC: 122 MG/DL (ref 65–99)
GLUCOSE BLD STRIP.AUTO-MCNC: 141 MG/DL (ref 65–99)
GLUCOSE BLD STRIP.AUTO-MCNC: 161 MG/DL (ref 65–99)
GLUCOSE BLD STRIP.AUTO-MCNC: 163 MG/DL (ref 65–99)
GLUCOSE BLD STRIP.AUTO-MCNC: 238 MG/DL (ref 65–99)
GLUCOSE SERPL-MCNC: 185 MG/DL (ref 65–99)
HCT VFR BLD AUTO: 38.8 % (ref 42–52)
HGB BLD-MCNC: 12.1 G/DL (ref 14–18)
MCH RBC QN AUTO: 28.7 PG (ref 27–33)
MCHC RBC AUTO-ENTMCNC: 31.2 G/DL (ref 33.7–35.3)
MCV RBC AUTO: 91.9 FL (ref 81.4–97.8)
PLATELET # BLD AUTO: 239 K/UL (ref 164–446)
PMV BLD AUTO: 11 FL (ref 9–12.9)
POTASSIUM SERPL-SCNC: 5.7 MMOL/L (ref 3.6–5.5)
RBC # BLD AUTO: 4.22 M/UL (ref 4.7–6.1)
SODIUM SERPL-SCNC: 144 MMOL/L (ref 135–145)
WBC # BLD AUTO: 11.8 K/UL (ref 4.8–10.8)

## 2022-06-09 PROCEDURE — 82962 GLUCOSE BLOOD TEST: CPT | Mod: 91

## 2022-06-09 PROCEDURE — 700111 HCHG RX REV CODE 636 W/ 250 OVERRIDE (IP): Performed by: HOSPITALIST

## 2022-06-09 PROCEDURE — 700105 HCHG RX REV CODE 258: Performed by: STUDENT IN AN ORGANIZED HEALTH CARE EDUCATION/TRAINING PROGRAM

## 2022-06-09 PROCEDURE — 99232 SBSQ HOSP IP/OBS MODERATE 35: CPT | Performed by: STUDENT IN AN ORGANIZED HEALTH CARE EDUCATION/TRAINING PROGRAM

## 2022-06-09 PROCEDURE — 700111 HCHG RX REV CODE 636 W/ 250 OVERRIDE (IP): Performed by: STUDENT IN AN ORGANIZED HEALTH CARE EDUCATION/TRAINING PROGRAM

## 2022-06-09 PROCEDURE — 80048 BASIC METABOLIC PNL TOTAL CA: CPT

## 2022-06-09 PROCEDURE — A9270 NON-COVERED ITEM OR SERVICE: HCPCS | Performed by: STUDENT IN AN ORGANIZED HEALTH CARE EDUCATION/TRAINING PROGRAM

## 2022-06-09 PROCEDURE — 700102 HCHG RX REV CODE 250 W/ 637 OVERRIDE(OP): Performed by: STUDENT IN AN ORGANIZED HEALTH CARE EDUCATION/TRAINING PROGRAM

## 2022-06-09 PROCEDURE — 770001 HCHG ROOM/CARE - MED/SURG/GYN PRIV*

## 2022-06-09 PROCEDURE — 36415 COLL VENOUS BLD VENIPUNCTURE: CPT

## 2022-06-09 PROCEDURE — 85027 COMPLETE CBC AUTOMATED: CPT

## 2022-06-09 RX ORDER — NYSTATIN 100000 [USP'U]/G
POWDER TOPICAL 2 TIMES DAILY
Status: DISCONTINUED | OUTPATIENT
Start: 2022-06-09 | End: 2022-06-09

## 2022-06-09 RX ADMIN — HEPARIN SODIUM 5000 UNITS: 5000 INJECTION, SOLUTION INTRAVENOUS; SUBCUTANEOUS at 06:26

## 2022-06-09 RX ADMIN — PREDNISONE 5 MG: 5 TABLET ORAL at 06:22

## 2022-06-09 RX ADMIN — MYCOPHENOLATE MOFETIL 500 MG: 250 CAPSULE ORAL at 06:22

## 2022-06-09 RX ADMIN — DAKIN'S SOLUTION 0.125% (QUARTER STRENGTH) 473 ML: 0.12 SOLUTION at 15:23

## 2022-06-09 RX ADMIN — SODIUM CHLORIDE: 4.5 INJECTION, SOLUTION INTRAVENOUS at 09:20

## 2022-06-09 RX ADMIN — NYSTATIN 500000 UNITS: 100000 SUSPENSION ORAL at 22:02

## 2022-06-09 RX ADMIN — TACROLIMUS 1 MG: 1 CAPSULE ORAL at 17:09

## 2022-06-09 RX ADMIN — TACROLIMUS 1 MG: 1 CAPSULE ORAL at 06:22

## 2022-06-09 RX ADMIN — HEPARIN SODIUM 5000 UNITS: 5000 INJECTION, SOLUTION INTRAVENOUS; SUBCUTANEOUS at 17:09

## 2022-06-09 RX ADMIN — MYCOPHENOLATE MOFETIL 500 MG: 250 CAPSULE ORAL at 17:11

## 2022-06-09 ASSESSMENT — PAIN DESCRIPTION - PAIN TYPE
TYPE: ACUTE PAIN

## 2022-06-09 NOTE — PROGRESS NOTES
Hospital Medicine Daily Progress Note    Date of Service  6/9/2022    Chief Complaint  Jama Altman is a 66 y.o. male admitted 5/24/2022 with AMS and fall    Hospital Course  66 y.o. male w/ HTN, DLD, diabetes,  polycystic kidney dz with a renal transplant 9/10/10 and on immunosuppression but has CKD, ADELFO who presented 5/24/2022 with altered mentation.  There was initial concern that he had fallen at home and struck his head.  In the ER he was in atrial fibrillation with a rapid rate of 120-160.  He was given a fluid bolus and initiated on norepinephrine.  He was admitted for severe septic shock likely related to a soft tissue infection of the right leg from where he bumped into something several days ago. During admit he was found to have E.coli bacteremia and right leg cellulitis.  Spinal fluid ws negative for meningitis.  On 5/26 the patient had respiratory distress and was intubated. On 5/28 the patient had PEA arrest and had CPR.  A right knee aspiration was performed and fluid analysis was not showing infection.  On 6/1 the patient was extubated.  The patient had CRRT initiated on 5/27 and daily HD on 5/28 per nephrology notes. Patient shows sign of recovery and has been off dialysis for a few days.  E. coli bacteremia, source likely RLE cellulitis. Completed iv meropenem with stop date 6/6/2022. ID consulted.     Interval Problem Update  Seen patient at bedside.   On 1/2 NS.    Nephrology following  Dc midodrine  Rehab evaluated the patient yesterday and patient will need to be able to tolerate 3 hours of therapy 5 days a week, and have discharge support    Consultants/Specialty  Infectious disease  Critical care medicine  Nephrology    Code Status  Full Code    Disposition  Pending at this time, Rehab vs. SNF    Review of Systems  All systems reviewed and negative except as noted per above.    Physical Exam  Temp:  [36.3 °C (97.3 °F)-36.8 °C (98.3 °F)] 36.3 °C (97.3 °F)  Pulse:  [78-92] 92  Resp:   [18-20] 20  BP: ()/(53-65) 108/65  SpO2:  [94 %-95 %] 95 %    General appearance: NAD, conversant, family at bedside  Eyes: anicteric sclerae, moist conjunctivae; no lid-lag; PERRLA, noted subconjunctival hemorrhage, pronounce on R eye   HENT: Atraumatic; oropharynx clear with moist mucous membranes and no mucosal ulcerations; normal hard and soft palate  Neck: Trachea midline; FROM, supple, no thyromegaly or lymphadenopathy  Lungs: CTA, with normal respiratory effort and no intercostal retractions  CV: RRR, no MRGs  Abdomen: Soft, non-tender; no masses or HSM  Extremities: RLE erythema with dressing noted  Skin: Normal temperature, turgor and texture; no rash, ulcers or subcutaneous nodules  Psych: Answering questions      Current Facility-Administered Medications:   •  1/2 NS infusion, , Intravenous, Continuous, Roderick Ramirez M.D., Last Rate: 50 mL/hr at 06/09/22 0920, New Bag at 06/09/22 0920  •  insulin GLARGINE (Lantus,Semglee) injection, 10 Units, Subcutaneous, BID, Timo Muñoz M.D., 10 Units at 06/09/22 0929  •  insulin regular (HumuLIN R,NovoLIN R) injection, 3-14 Units, Subcutaneous, 4X/DAY ACHS, 3 Units at 06/08/22 2156 **AND** POC blood glucose manual result, , , Q AC AND BEDTIME(S) **AND** NOTIFY MD and PharmD, , , Once **AND** Administer 20 grams of glucose (approximately 8 ounces of fruit juice) every 15 minutes PRN FSBG less than 70 mg/dL, , , PRN **AND** dextrose 50% (D50W) injection 25 g, 25 g, Intravenous, Q15 MIN PRN, Linsey M Wegener, A.P.R.N.  •  acetaminophen (Tylenol) tablet 650 mg, 650 mg, Oral, Q6HRS PRN, Mehrdad Laughlin M.D., 650 mg at 06/05/22 1000  •  tacrolimus (PROGRAF) capsule 1 mg, 1 mg, Oral, BID, Mehrdad Laughlin M.D., 1 mg at 06/09/22 0622  •  senna-docusate (PERICOLACE or SENOKOT S) 8.6-50 MG per tablet 2 Tablet, 2 Tablet, Oral, BID, 2 Tablet at 06/07/22 0409 **AND** polyethylene glycol/lytes (MIRALAX) PACKET 1 Packet, 1 Packet, Oral, QDAY PRN **AND** magnesium hydroxide  (MILK OF MAGNESIA) suspension 30 mL, 30 mL, Oral, QDAY PRN **AND** bisacodyl (DULCOLAX) suppository 10 mg, 10 mg, Rectal, QDAY PRN, Mehrdad Laughlin M.D.  •  mycophenolate (CELLCEPT) capsule 500 mg, 500 mg, Oral, BID, Mehrdad Laughlin M.D., 500 mg at 06/09/22 0622  •  oxyCODONE immediate-release (ROXICODONE) tablet 5 mg, 5 mg, Oral, Q4HRS PRN, Mehrdad Laughlin M.D.  •  predniSONE (DELTASONE) tablet 5 mg, 5 mg, Oral, DAILY, Mehrdad Laughlin M.D., 5 mg at 06/09/22 0622  •  heparin injection 5,000 Units, 5,000 Units, Subcutaneous, Q12HRS, James Titus M.D., 5,000 Units at 06/09/22 0626  •  heparin injection 2,800 Units, 2,800 Units, Intracatheter, DIALYSIS PRN, James Sheppard M.D., 2,800 Units at 06/02/22 1400  •  Respiratory Therapy Consult, , Nebulization, Continuous RT, Reuben Swartz M.D.  •  sodium chloride (OCEAN) 0.65 % nasal spray 2 Spray, 2 Spray, Nasal, Q2HRS PRN, Kayy Hopkins M.D.  •  dakins 0.125% (1/4 strength) topical soln, , Topical, BID, Timo Muñoz M.D., 473 mL at 06/09/22 0928  •  HYDROmorphone (Dilaudid) injection 0.5 mg, 0.5 mg, Intravenous, Q2HRS PRN, Tracey Whitfield M.D., 0.5 mg at 05/31/22 0059      Fluids    Intake/Output Summary (Last 24 hours) at 6/9/2022 0953  Last data filed at 6/9/2022 0600  Gross per 24 hour   Intake 790 ml   Output 1400 ml   Net -610 ml       Laboratory  Recent Labs     06/07/22  0329 06/08/22  0611   WBC 12.5* 11.9*   RBC 4.43* 3.87*   HEMOGLOBIN 12.6* 11.1*   HEMATOCRIT 40.4* 35.1*   MCV 91.2 90.7   MCH 28.4 28.7   MCHC 31.2* 31.6*   RDW 50.0 49.9   PLATELETCT 233 177   MPV 11.6 11.6     Recent Labs     06/07/22  0329 06/08/22  0611   SODIUM 143 146*   POTASSIUM 4.5 4.7   CHLORIDE 107 112   CO2 26 23   GLUCOSE 51* 58*   BUN 71* 55*   CREATININE 2.08* 1.68*   CALCIUM 8.0* 7.1*                   Imaging  CT-HEAD W/O   Final Result      1.  Cerebral atrophy.      2.  Bilateral mastoid effusions.      3.  Otherwise, Head CT without contrast within normal limits. No  evidence of acute intracranial hemorrhage or mass lesion.         DX-ABDOMEN FOR TUBE PLACEMENT   Final Result      Enteric feeding tube terminates with the tip projecting over the expected location of the 2nd-3rd portion of the duodenum.      DX-CHEST-LIMITED (1 VIEW)   Final Result      1.  Bibasilar underinflation atelectasis which could obscure an additional process. This is unchanged.   2.  Interstitial opacities likely pulmonary edema   3.  Persistently enlarged cardiac silhouette      DX-CHEST-LIMITED (1 VIEW)   Final Result      1.  Hypoinflation with mildly increased left basilar atelectasis.   2.  Removal of endotracheal tube.      DX-CHEST-LIMITED (1 VIEW)   Final Result         No significant interval change.      DX-CHEST-LIMITED (1 VIEW)   Final Result      Stable areas of patchy atelectasis/consolidation.      DX-CHEST-PORTABLE (1 VIEW)   Final Result      Stable chest x-ray findings.      DX-CHEST-PORTABLE (1 VIEW)   Final Result         1.  Pulmonary edema and/or infiltrates, somewhat increased particularly in the right lung base compared to prior study.   2.  Cardiomegaly      CT-ABDOMEN-PELVIS W/O   Final Result         Limited noncontrast exam      1. Long segment wall thickening from the descending colon to the sigmoid colon could relate to underdistention or colitis. Air-fluid level in the more proximal colon is likely diarrheal disease.      2. Right lower quadrant transplant kidney with retained prior contrast, likely secondary to renal failure/ATN/contrast nephropathy. No hydronephrosis.      Absent right kidney. Polycystic left kidney.      CT-EXTREMITY, LOWER W/O RIGHT   Final Result      1. Postsurgical changes of right total knee arthroplasty.   2. Right knee joint effusion with thickening of the joint capsule and surrounding soft tissue edema.   3. Circumferential edema involving the soft tissues of the right lower leg with an anterior soft tissue defect and with areas of clustering on  the medial right lower leg skin surface.   4. Arteriosclerosis.      DX-CHEST-PORTABLE (1 VIEW)   Final Result      1.  Unchanged position of supporting devices are visualized extent   2.  No visible pneumothorax following chest compressions   3.  Unchanged atelectasis and possible superimposed interstitial pulmonary edema or atypical pneumonia      US-EXTREMITY ARTERY LOWER UNILAT RIGHT   Final Result      US-MIRELLA SINGLE LEVEL BILAT   Final Result      US-EXTREMITY ARTERY LOWER BILAT W/MIRELLA (COMBO)   Final Result      DX-CHEST-PORTABLE (1 VIEW)   Final Result         1.  Pulmonary edema and/or infiltrates, somewhat increased particularly in the right lung base compared to prior study.   2.  Cardiomegaly      DX-ABDOMEN FOR TUBE PLACEMENT   Final Result      1. The tip of the feeding tube terminates over the junction of the gastric pylorus and duodenal bulb.   2. The remainder is stable.      DX-CHEST-PORTABLE (1 VIEW)   Final Result      1. Interval decrease in size of the right pleural effusion.   2. No left pleural effusion.   3. No visible pneumothorax status post bronchoscopy.   4. Interval placement of a Dobbhoff feeding tube.   5. Improving parenchymal loss in the right lung base, incompletely resolved.   6. The remainder is stable.      DX-CHEST-PORTABLE (1 VIEW)   Final Result      1. Interval development of a moderate right pleural effusion after placement of a left internal jugular dialysis catheter, abutting the right lateral wall of the right atrium. Verification of line position with contrast injection under fluoroscopy is    offered.   2. Improved perihilar atelectasis.   3. The remainder is stable.      I, Dr. Matthew Brown, discussed the results of this examination directly by phone with Dr. Reuben Swartz on 5/26/2022 at 0855 hours.      EC-ECHOCARDIOGRAM COMPLETE W/ CONT   Final Result      DX-CHEST-LIMITED (1 VIEW)   Final Result         1. Right internal jugular central venous access catheter  placed in the interval terminates over the upper aspect of the right atrium. No postprocedure visible pneumothorax.   2. Stable patchy parenchymal opacities in the lungs, with a stable small left pleural effusion.      DX-CHEST-PORTABLE (1 VIEW)   Final Result         1.  Pulmonary edema and/or infiltrates are identified, which are stable since the prior exam.   2.  Trace bilateral pleural effusions   3.  Cardiomegaly      CT-EXTREMITY, LOWER W/O RIGHT   Final Result      1.  Status post right total knee replacement.      2.  Diffuse atherosclerotic calcification of the arterial system of the right lower extremity suspicious for diabetes mellitus.      3.  Soft tissue attenuation in the subcutaneous tissues of the mid to distal lower leg and foot suspicious for cellulitis.      4.  No evidence of soft tissue ulceration in the right lower leg or foot.      5.  No evidence of acute osteomyelitis in the right lower leg or foot.      6.  Small subcutaneous abscesses cannot be excluded without the use of intravenous contrast.      DX-CHEST-PORTABLE (1 VIEW)   Final Result      Left internal jugular central venous access catheter terminates over a persistent left superior vena cava. No postprocedure visible pneumothorax.      The remainder is stable.      DX-TIBIA AND FIBULA RIGHT   Final Result      1. Right lower leg soft tissue swelling.   2. No acute fracture or subluxation.   3. Postsurgical changes of right total knee arthroplasty.      US-ABDOMEN F.A.S.T. LTD (FOR ED USE ONLY)   Final Result      No free fluid seen in all 4 quadrants.      Negative FAST scan.            CT-ABDOMEN-PELVIS WITH   Final Result      1. No acute posttraumatic findings in the abdomen or pelvis.   2. Bibasilar subsegmental atelectasis.   3. Right pelvic transplant kidney without hydronephrosis.   4. Polycystic left kidney.   5. Diverticulosis without diverticulitis. Normal appendix.      CT-CTA CHEST PULMONARY ARTERY W/ RECONS   Final  Result      1.  No CT evidence of pulmonary emboli.      2.  Bibasilar atelectasis.      3.  No evidence of rib fracture.      4. Diffuse coronary artery calcification.      CT-HEAD W/O   Final Result      1.  Cerebral atrophy.      2.  Otherwise, Head CT without contrast within normal limits. No evidence of acute cerebral infarction, hemorrhage or mass lesion.         CT-CSPINE WITHOUT PLUS RECONS   Final Result      1.  Moderate osteoarthritic changes at the C6-7 level with disc space narrowing and marginal osteophytosis. Further there is moderate cervical spondylotic changes at this level.      2.  No evidence of cervical spine fracture and/or subluxation.      DX-PELVIS-1 OR 2 VIEWS   Final Result      1.  Unremarkable single AP view of the pelvis.      DX-CHEST-LIMITED (1 VIEW)   Final Result      1.  Curvilinear perihilar opacities likely atelectasis and/or parenchymal scarring.      2.  Mild cardiomegaly.           Assessment/Plan  * Septic shock (HCC)- (present on admission)  Assessment & Plan  Sec to E.coli bacteremia from RLE cellulitis  Completed abx   Resolved    Bacteremia due to Escherichia coli- (present on admission)  Assessment & Plan  Blood culture pos E coli on 5/24. Repeated blood culture negative to date. Source likely sec to RLE cellulitis  Completed 10 days course of IV meropenem (started on 5/28 given new fever and worsening chest x-ray) on 6/6  ID consulted     Acute renal failure superimposed on stage 3a chronic kidney disease (HCC)  Assessment & Plan  Nephrology consulting  Hemodialysis as per nephrology. Has been off HD as renal function improving  Monitor vitals, I/O's, labs  Avoid nephrotoxins.  Immunosuppression for hx of renal transplant    Knee effusion, right  Assessment & Plan  s/p bedside right knee joint aspiration on 5/28.  Synovial fluid analysis reveals hazy priscila fluid, 2638 WBCs with PMN predominance and no crystals seen.  Fluid not consistent with infection.  Culture  neg    Cardiac arrest (HCC)  Assessment & Plan  S/p PEA  Monitor labs, vitals.    Acute metabolic encephalopathy  Assessment & Plan  Improving.  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    Acute respiratory failure with hypoxia ([facility])  Assessment & Plan  Intubated 5/26 and extubated 6/1  Mobilize as able  Respiratory protocol  Supplemental oxygen and wean as able      PAF (paroxysmal atrial fibrillation) ([facility])- (present on admission)  Assessment & Plan  Hx of pAFib , not on OAC at home.   Outpatient metoprolol 25mg daily on hold due to septic shock and borderline hypotension  Reinitiate once able.  Rate controlled currently  Outpatient cardiology/PCP follow up regards OAC use.     Type 2 diabetes mellitus with hyperlipidemia ([facility])- (present on admission)  Assessment & Plan  On lantus and SSI. Holding home po diabetes meds  Hypoglycemic protocol   A1c 10.7    Thrombocytopenia ([facility])- (present on admission)  Assessment & Plan  Resolved        VTE prophylaxis: heparin ppx    I have performed a physical exam and reviewed and updated ROS and Plan today (6/9/2022). In review of yesterday's note (6/8/2022), there are no changes except as documented above.

## 2022-06-09 NOTE — DISCHARGE PLANNING
HTH/SCP TCN chart review completed. Collaborated with DWAINE López. Note that current plan is for pt to dc to post acute placement (likely SNF level of care at this time). Noted per physiatry patient will need to demonstrate increased tolerance and endurance for IPR level of therapy and currently appears indicated more need for SNF level of care post acute. Per chart and discussion with CM, awaiting responses from SNF for acceptance/bed availability. TCN will continue to follow and collaborate with discharge planning team as additional post acute needs arise. Thank you.    Previously completed:  - Orders received for: PT and OT -> rec post acute placement  - Choice forms:  SNF, IRF   - [facility] introduced (Y), referral (not sent anticipating placement).

## 2022-06-09 NOTE — PROGRESS NOTES
Assumed care of pt at 0700. Report received and bedside rounding completed with night RN. Pt is calm no SOB, no acute distress noted.  Call light and pt belongings within reach - hourly rounding in place. See flowsheets for further assessment.     Wife at bedside requesting neck dressing to be changed. Repositioning patient.  POC discussed, questions answered.

## 2022-06-09 NOTE — PROGRESS NOTES
Assumed pt care at 1900 . Pt is A&Ox 2, disoriented from person (got his birthday incorrect) and time . Pt rates pain 0/10.  Pt's belongings are nearby, bed is in the lowest position and locked. Hourly rounding in place.

## 2022-06-09 NOTE — PROGRESS NOTES
Spoke to wife and brother at bedside. Patient is being seen by therapy and they come to evaluate patient.   This RN talked to therapist, bedside RN to sit patient on EOB to aid and help with building tolerance.   RN relayed message to Wife and brother.     Updated wife and brother on patients oxygen's needs and successfully weaning off oxygen.   Discussed dressing changes with family at bedside. Questions answered.   Per family, patient not eating due to lack of interest in food texture. This RN to notify MATT.

## 2022-06-09 NOTE — DISCHARGE PLANNING
Case Management Discharge Planning    Admission Date: 5/24/2022  GMLOS: 12.4  ALOS: 16    6-Clicks ADL Score: 12  6-Clicks Mobility Score: 7  PT and/or OT Eval ordered: Yes  Post-acute Referrals Ordered: Yes  Post-acute Choice Obtained: Yes  Has referral(s) been sent to post-acute provider:  Yes      Anticipated Discharge Dispo: Discharge Disposition: Disch to  rehab facility or distinct part unit (62) Patient may require SNF instead based on progression and endurance for rehab.    DME Needed: Unable to determine at this time.    Action(s) Taken: Updated Provider/Nurse on Discharge Plan and OTHER: collaborated with MATEUSZ Patricia for [facility] regarding discharge plan. Awaiting updates from SNF referrals for acceptance/bed availability.    Escalations Completed: PT, Pending Discharge Destination and Bedside RN, DPA for accepting referrals and bed availability     Medically Clear: Yes    Next Steps: f/u with medical and nursing teams regarding treatment and discharge plans, needs, and barriers. F/u with DPA regarding SNF referrals, acceptance and bed availability.    Barriers to Discharge: Pending Placement    Is the patient up for discharge tomorrow: No

## 2022-06-09 NOTE — CARE PLAN
The patient is Stable - Low risk of patient condition declining or worsening    Shift Goals  Clinical Goals: monitor VS, maintaining skin integrity  Patient Goals: rest  Family Goals: N/A    Progress made toward(s) clinical / shift goals:  Pt was educated on importance of maintaining  Problem: Knowledge Deficit - Standard  Goal: Patient and family/care givers will demonstrate understanding of plan of care, disease process/condition, diagnostic tests and medications  6/9/2022 0356 by Jama Yang R.N.  Outcome: Progressing  6/9/2022 0356 by Jama Yang R.N.  Outcome: Progressing     Problem: Pain - Standard  Goal: Alleviation of pain or a reduction in pain to the patient’s comfort goal  Outcome: Progressing     Problem: Skin Integrity  Goal: Skin integrity is maintained or improved  Outcome: Progressing     Problem: Fall Risk  Goal: Patient will remain free from falls  Outcome: Progressing     Problem: Discharge Barriers/Planning  Goal: Patient's continuum of care needs are met  Outcome: Progressing     Problem: Urinary Elimination  Goal: Establish and maintain regular urinary output  Outcome: Progressing     Problem: Mobility  Goal: Patient's capacity to carry out activities will improve  Outcome: Progressing     Problem: Self Care  Goal: Patient will have the ability to perform ADLs independently or with assistance (bathe, groom, dress, toilet and feed)  Outcome: Progressing     Problem: Infection - Standard  Goal: Patient will remain free from infection  Outcome: Progressing     Problem: Wound/ / Incision Healing  Goal: Patient's wound/surgical incision will decrease in size and heals properly  Outcome: Progressing    skin integrity    Patient is not progressing towards the following goals:

## 2022-06-09 NOTE — PROGRESS NOTES
[facility] -  PROGRESS NOTE               Author: Roderick Ramirez M.D. Date & Time: 6/9/2022  9:52 AM     HPI:  Patient is a 66 year old male with a PMHx of renal transplant 9/10/2010 for polycystic kidney disease, DM, thrombocytopenia, CKD3b, pafib, covid19 infection, who presents for AMS.  He was brought in activated trauma for fall, CT imaging has been negative, but was in severe septic shock started on pressors and give nfluid boluses.  On broad spectrum abx.  Currently complains of right leg pain but thinks its better.  Confirms his last dose of tacrolimus and cellcept was yesterday.  Currently on levophed    DAILY NEPHROLOGY SUMMARY:  5/24: Consult done  5/25: NAEO, no complaints, feels a bit better, family at bedside  5/26: Decompensated overnight, intubated due to resp. Distress and pressors initiated  5/27: NAEO, no complaints, hemodynamics decompensated and CRRT initiated instead, tolerated well overnight, still on it this AM  5/28: transitioned to daily iHD for now, +13.8L for hospitalization, remains intubated, on pressor support, Tmax 101.1F, WBC 28.9  5/29: tolerated HD, UF 3L 5/28, remains intubated and on pressor support  5/30: Tolerated UF, improved hemodynamics, remains on pressors, remains on mechanical ventilation   5/31: Seen on Dialysis, vasc cath sluggish despite TPA, only able to run   6/1: Tolerated HD, extubated, UOP increasing-2.7L yesterday, net neg 5L  6/2: Intermittent encephalopathy per wife,  2.5L UOP yesterday, soft Bps, feeling stronger today  6/3: low Bps, brisk UOP-2.6L, with minimal PO intake, thirsty, NO UF with HD, labs pending  6/4: Denies pain or SOB.  UOP 1.5L  No new labs  6/5: Denies pain or SOB.  Feels thirsty.  Creatinine down to 2.4.  6/6: Denies pain or SOB.  Feels thirsty.  No new labs today.  UOP at 3.1L  6/7: Denies pain or SOB.  Drinking thickened water.  Creatinine down to 2.  6/8: Creatinine continues to trend down.  Still has  "dialysis catheter in place  6/9: No new labs this morning.  On thickened liquids still.  Denies pain or SOB.  Temp HD cath removed.    REVIEW OF SYSTEMS:    Limited due to pt communication     PMH/PSH/SH/FH:   Reviewed and unchanged since admission note    CURRENT MEDICATIONS:   Reviewed from admission to present day    VS:  VS:  /65   Pulse 92   Temp 36.3 °C (97.3 °F) (Temporal)   Resp 20   Ht 1.956 m (6' 5\")   Wt 121 kg (267 lb 3.2 oz)   SpO2 95%   BMI 31.68 kg/m²   GENERAL: Ill appearing  CV: no pedal edema  RESP:non-labored  GI: Soft  MSK: No joint deformities   SKIN: ecchymoses  NEURO: No tremor  PSYCH: Deferred    Fluids:  In: 790 [P.O.:240; I.V.:550]  Out: 1400     LABS:  Recent Labs     06/07/22  0329 06/08/22  0611   SODIUM 143 146*   POTASSIUM 4.5 4.7   CHLORIDE 107 112   CO2 26 23   GLUCOSE 51* 58*   BUN 71* 55*   CREATININE 2.08* 1.68*   CALCIUM 8.0* 7.1*     IMAGING:   All imaging reviewed from admission to present day    IMPRESSION:  # SANKET  - Etiology likely 2/2 ATN  - has received contrast  - RRT dependent since 5/27, now with signs of recovery  # DDKT  - 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 Coli bacteremia/septic shock  - Source unclear, ?colitis on imaging  - Abx on board  - pressor support  # Hyperkalemia, improved  - No ECG changes  # Encephalopathy  - Etiology likely 2/2 hypoperfusion/sepsis  # Acute respiratory failure  - Intubated 5/26  # RLE ulcer  - Trauma  # type 2 DM  # thrombocytopenia  - worsening    PLAN:  -Continue 1/2NS at 50cc/hr  -Repeat BMP tomorrow  -Holding lasix  -tacrolimus 1mg BID, MMF  -Daily labs and weights  -renal formula tube feeds  -Monitor I/O  -Dose all meds per eGFR     Thank you    "

## 2022-06-09 NOTE — DISCHARGE PLANNING
RN CM received a call from patient's son, Afshin Altman [phone], requesting a meeting with the attending, nursing staff, PT/OT, and case management to discuss frequency of PT/OT, plan of care/goals, patient's current status, and needs. RN CM spoke with attending, charge nurse, bedside nurse regarding setting up a meeting with the patient's son tomorrow. They are agreeable. RN CM waiting for a mutually agreeable time so that the son can be notified.

## 2022-06-09 NOTE — CARE PLAN
The patient is Stable - Low risk of patient condition declining or worsening    Shift Goals  Clinical Goals: therapy. monitor oxygenation titrate. sit at eob to work on tolarence.  Patient Goals: pan control, rest  Family Goals: N/A    Progress made toward(s) clinical / shift goals:  Patient sucessfully able to be weaned off completely off of oxygen. RA at this time sat 92%  Pt to sit at EOB to work on building tolerance.   Wound dressing changes qshift with dakin's solution.         Problem: Skin Integrity  Goal: Skin integrity is maintained or improved  Outcome: Progressing     Problem: Discharge Barriers/Planning  Goal: Patient's continuum of care needs are met  Outcome: Progressing     Problem: Wound/ / Incision Healing  Goal: Patient's wound/surgical incision will decrease in size and heals properly  Outcome: Progressing       Patient is not progressing towards the following goals:

## 2022-06-10 PROBLEM — E87.5 HYPERKALEMIA: Status: ACTIVE | Noted: 2022-06-10

## 2022-06-10 LAB
ANION GAP SERPL CALC-SCNC: 11 MMOL/L (ref 7–16)
BUN SERPL-MCNC: 54 MG/DL (ref 8–22)
CALCIUM SERPL-MCNC: 8.3 MG/DL (ref 8.5–10.5)
CHLORIDE SERPL-SCNC: 111 MMOL/L (ref 96–112)
CO2 SERPL-SCNC: 22 MMOL/L (ref 20–33)
CREAT SERPL-MCNC: 1.81 MG/DL (ref 0.5–1.4)
EKG IMPRESSION: NORMAL
ERYTHROCYTE [DISTWIDTH] IN BLOOD BY AUTOMATED COUNT: 48.8 FL (ref 35.9–50)
GFR SERPLBLD CREATININE-BSD FMLA CKD-EPI: 41 ML/MIN/1.73 M 2
GLUCOSE BLD STRIP.AUTO-MCNC: 157 MG/DL (ref 65–99)
GLUCOSE BLD STRIP.AUTO-MCNC: 167 MG/DL (ref 65–99)
GLUCOSE BLD STRIP.AUTO-MCNC: 195 MG/DL (ref 65–99)
GLUCOSE BLD STRIP.AUTO-MCNC: 213 MG/DL (ref 65–99)
GLUCOSE SERPL-MCNC: 196 MG/DL (ref 65–99)
HCT VFR BLD AUTO: 39.9 % (ref 42–52)
HGB BLD-MCNC: 12.7 G/DL (ref 14–18)
MCH RBC QN AUTO: 29 PG (ref 27–33)
MCHC RBC AUTO-ENTMCNC: 31.8 G/DL (ref 33.7–35.3)
MCV RBC AUTO: 91.1 FL (ref 81.4–97.8)
PLATELET # BLD AUTO: 242 K/UL (ref 164–446)
PMV BLD AUTO: 10.8 FL (ref 9–12.9)
POTASSIUM SERPL-SCNC: 5.6 MMOL/L (ref 3.6–5.5)
RBC # BLD AUTO: 4.38 M/UL (ref 4.7–6.1)
SODIUM SERPL-SCNC: 144 MMOL/L (ref 135–145)
WBC # BLD AUTO: 11.2 K/UL (ref 4.8–10.8)

## 2022-06-10 PROCEDURE — 700111 HCHG RX REV CODE 636 W/ 250 OVERRIDE (IP): Performed by: HOSPITALIST

## 2022-06-10 PROCEDURE — 80048 BASIC METABOLIC PNL TOTAL CA: CPT

## 2022-06-10 PROCEDURE — 700111 HCHG RX REV CODE 636 W/ 250 OVERRIDE (IP): Performed by: STUDENT IN AN ORGANIZED HEALTH CARE EDUCATION/TRAINING PROGRAM

## 2022-06-10 PROCEDURE — 92526 ORAL FUNCTION THERAPY: CPT

## 2022-06-10 PROCEDURE — 700102 HCHG RX REV CODE 250 W/ 637 OVERRIDE(OP): Performed by: HOSPITALIST

## 2022-06-10 PROCEDURE — 97530 THERAPEUTIC ACTIVITIES: CPT

## 2022-06-10 PROCEDURE — 700102 HCHG RX REV CODE 250 W/ 637 OVERRIDE(OP): Performed by: STUDENT IN AN ORGANIZED HEALTH CARE EDUCATION/TRAINING PROGRAM

## 2022-06-10 PROCEDURE — 82962 GLUCOSE BLOOD TEST: CPT | Mod: 91

## 2022-06-10 PROCEDURE — 97535 SELF CARE MNGMENT TRAINING: CPT

## 2022-06-10 PROCEDURE — 700105 HCHG RX REV CODE 258: Performed by: STUDENT IN AN ORGANIZED HEALTH CARE EDUCATION/TRAINING PROGRAM

## 2022-06-10 PROCEDURE — A9270 NON-COVERED ITEM OR SERVICE: HCPCS | Performed by: STUDENT IN AN ORGANIZED HEALTH CARE EDUCATION/TRAINING PROGRAM

## 2022-06-10 PROCEDURE — 85027 COMPLETE CBC AUTOMATED: CPT

## 2022-06-10 PROCEDURE — 36415 COLL VENOUS BLD VENIPUNCTURE: CPT

## 2022-06-10 PROCEDURE — 770001 HCHG ROOM/CARE - MED/SURG/GYN PRIV*

## 2022-06-10 PROCEDURE — 93005 ELECTROCARDIOGRAM TRACING: CPT

## 2022-06-10 PROCEDURE — 99232 SBSQ HOSP IP/OBS MODERATE 35: CPT | Performed by: STUDENT IN AN ORGANIZED HEALTH CARE EDUCATION/TRAINING PROGRAM

## 2022-06-10 PROCEDURE — 93010 ELECTROCARDIOGRAM REPORT: CPT | Performed by: INTERNAL MEDICINE

## 2022-06-10 PROCEDURE — A9270 NON-COVERED ITEM OR SERVICE: HCPCS | Performed by: HOSPITALIST

## 2022-06-10 RX ADMIN — NYSTATIN 500000 UNITS: 100000 SUSPENSION ORAL at 20:41

## 2022-06-10 RX ADMIN — NYSTATIN 500000 UNITS: 100000 SUSPENSION ORAL at 17:35

## 2022-06-10 RX ADMIN — NYSTATIN 500000 UNITS: 100000 SUSPENSION ORAL at 08:25

## 2022-06-10 RX ADMIN — DOCUSATE SODIUM 50 MG AND SENNOSIDES 8.6 MG 2 TABLET: 8.6; 5 TABLET, FILM COATED ORAL at 17:35

## 2022-06-10 RX ADMIN — PREDNISONE 5 MG: 5 TABLET ORAL at 04:51

## 2022-06-10 RX ADMIN — DOCUSATE SODIUM 50 MG AND SENNOSIDES 8.6 MG 2 TABLET: 8.6; 5 TABLET, FILM COATED ORAL at 04:51

## 2022-06-10 RX ADMIN — PATIROMER 8.4 G: 8.4 POWDER, FOR SUSPENSION ORAL at 11:52

## 2022-06-10 RX ADMIN — TACROLIMUS 1 MG: 1 CAPSULE ORAL at 04:51

## 2022-06-10 RX ADMIN — DAKIN'S SOLUTION 0.125% (QUARTER STRENGTH) 473 ML: 0.12 SOLUTION at 04:53

## 2022-06-10 RX ADMIN — MYCOPHENOLATE MOFETIL 500 MG: 250 CAPSULE ORAL at 17:35

## 2022-06-10 RX ADMIN — TACROLIMUS 1 MG: 1 CAPSULE ORAL at 17:35

## 2022-06-10 RX ADMIN — SODIUM CHLORIDE: 4.5 INJECTION, SOLUTION INTRAVENOUS at 12:33

## 2022-06-10 RX ADMIN — HEPARIN SODIUM 5000 UNITS: 5000 INJECTION, SOLUTION INTRAVENOUS; SUBCUTANEOUS at 04:51

## 2022-06-10 RX ADMIN — MYCOPHENOLATE MOFETIL 500 MG: 250 CAPSULE ORAL at 04:51

## 2022-06-10 RX ADMIN — HEPARIN SODIUM 5000 UNITS: 5000 INJECTION, SOLUTION INTRAVENOUS; SUBCUTANEOUS at 17:35

## 2022-06-10 RX ADMIN — NYSTATIN 500000 UNITS: 100000 SUSPENSION ORAL at 12:08

## 2022-06-10 RX ADMIN — DAKIN'S SOLUTION 0.125% (QUARTER STRENGTH) 473 ML: 0.12 SOLUTION at 17:35

## 2022-06-10 RX ADMIN — METOPROLOL TARTRATE 12.5 MG: 25 TABLET, FILM COATED ORAL at 17:34

## 2022-06-10 RX ADMIN — METOPROLOL TARTRATE 12.5 MG: 25 TABLET, FILM COATED ORAL at 08:25

## 2022-06-10 ASSESSMENT — COGNITIVE AND FUNCTIONAL STATUS - GENERAL
MOVING TO AND FROM BED TO CHAIR: UNABLE
TURNING FROM BACK TO SIDE WHILE IN FLAT BAD: UNABLE
STANDING UP FROM CHAIR USING ARMS: TOTAL
WALKING IN HOSPITAL ROOM: TOTAL
MOVING FROM LYING ON BACK TO SITTING ON SIDE OF FLAT BED: UNABLE
PERSONAL GROOMING: A LOT
CLIMB 3 TO 5 STEPS WITH RAILING: TOTAL
MOBILITY SCORE: 6
EATING MEALS: A LOT
DRESSING REGULAR LOWER BODY CLOTHING: A LOT
DRESSING REGULAR UPPER BODY CLOTHING: A LOT
DAILY ACTIVITIY SCORE: 12
HELP NEEDED FOR BATHING: A LOT
SUGGESTED CMS G CODE MODIFIER MOBILITY: CN
TOILETING: A LOT
SUGGESTED CMS G CODE MODIFIER DAILY ACTIVITY: CL

## 2022-06-10 ASSESSMENT — PAIN DESCRIPTION - PAIN TYPE: TYPE: ACUTE PAIN

## 2022-06-10 ASSESSMENT — GAIT ASSESSMENTS: GAIT LEVEL OF ASSIST: UNABLE TO PARTICIPATE

## 2022-06-10 NOTE — DISCHARGE PLANNING
Per Dr. Cope:To be functionally ready for IPR, patient will least need to be able to stand up with min assist and walk a few feet at mod assist or better. As well as sufficient D/C resources/support.

## 2022-06-10 NOTE — THERAPY
Physical Therapy   Daily Treatment     Patient Name: Jama Altman  Age:  66 y.o., Sex:  male  Medical Record #: [id number]  Today's Date: 6/10/2022     Precautions  Precautions: Fall Risk;Swallow Precautions ( See Comments)    Assessment    Patient initially declined PT tx session, agreeable with education and encouragement.  Patient with increased confusion compared to initial evaluation & demonstrated delayed responses.  He required max A for supine <> sit due to weakness, poor initiation, and decreased activity tolerance.  Patient with improved tolerance for sitting EOB, sitting for ~8/10 min and performing LAQ exercise with encouragement.  Further mobility deferred due to decreased spO2.  RN notified, spO2 improved in supine.   Significant time spent educating patient's son and ex-wife on PT POC & frequency, role of acute vs post acute PT, and compensatory strategies for increasing mobility outside of PT sessions.  Encouraged patient to sit on EOB ~2x/day with nursing staff to increase tolerance for EOB and OOB mobility.  Educated family to encourage normal sleep-wake cycles to reduce confusion and remind patient to perform exercises and mobility with nurses.  Answered all of family's questions.  PT to continue to follow.    Plan    Continue current treatment plan.    DC Equipment Recommendations: Unable to determine at this time  Discharge Recommendations: Recommend post-acute placement for additional physical therapy services prior to discharge home     Objective     06/10/22 1300   Precautions   Precautions Fall Risk;Swallow Precautions ( See Comments)   Cognition    Cognition / Consciousness X   Level of Consciousness Alert   New Learning Impaired   Sequencing Impaired   Initiation Impaired   Comments Delayed responses, confused.  Stated year was 1992.  Required increased time and cues   Sitting Lower Body Exercises   Sitting Lower Body Exercises Yes   Long Arc Quad 1 set of 15;Right;1 set of 10;Left    Balance   Sitting Balance (Static) Fair -   Sitting Balance (Dynamic) Fair -   Weight Shift Sitting Poor   Skilled Intervention Verbal Cuing;Tactile Cuing;Compensatory Strategies   Comments Intially required assistance to maintain seated balance, improved with repositioning   Gait Analysis   Gait Level Of Assist Unable to Participate   Bed Mobility    Supine to Sit Maximal Assist   Sit to Supine Maximal Assist   Scooting Maximal Assist   Rolling Maximal Assist to Rt.   Skilled Intervention Verbal Cuing;Tactile Cuing;Facilitation   Comments HOB elevated, poor initiation   Functional Mobility   Sit to Stand Unable to Participate   Mobility EOB balance & exercises   Comments Mobility beyond EOB deferred due to spO2 dropping, difficulty breathing through NC due to congestion   Activity Tolerance   Sitting Edge of Bed ~8-10 min total   Comments limited by fatigue, spO2   Short Term Goals    Short Term Goal # 1 Pt will perform supine <> sit without bed features within 6 visits in order to progress toward PLOF   Goal Outcome # 1 goal not met   Short Term Goal # 2 Pt will perform STS with FWW and min A within 6 visits in order to progress OOB mobility   Goal Outcome # 2 Goal not met   Short Term Goal # 3 Pt will perform functional transfers with FWW and min A within 6 visits in order to progress OOB mobility   Goal Outcome # 3 Goal not met   Short Term Goal # 4 Pt will ambulate 100 ft with FWW and mod A within 6 visits in order to progress functional mobility   Goal Outcome # 4 Goal not met   Anticipated Discharge Equipment and Recommendations   DC Equipment Recommendations Unable to determine at this time   Discharge Recommendations Recommend post-acute placement for additional physical therapy services prior to discharge home

## 2022-06-10 NOTE — PROGRESS NOTES
Received report from NOC RN and assumed care of patient. Pt A&Ox3, disoriented to time. Pt denies pain. Call light and belongings within reach. Bed locked and in low position.

## 2022-06-10 NOTE — THERAPY
Missed Therapy     Patient Name: Jama Altman  Age:  66 y.o., Sex:  male  Medical Record #: [id number]  Today's Date: 6/10/2022    Came for planned family meeting at 9 am pre-planned by MD and family member to discuss OT POC, no family present spoke with RN and MD. Waited on floor for 15 minutes, unable to continue to wait due to time constraints with case load.

## 2022-06-10 NOTE — DIETARY
Nutrition Services: Update   Day 17 of admit.  Jama Altman is a 66 y.o. male with admitting DX of Septic shock.    Pt is currently on Level 4 - pureed, Level 2 - mildly thick, Consistent CHO diet. Recorded PO intake has been poor with pt usually eating <25% to 25-50% of meals. Lunch today was 50-75%. Pt previously receiving magic cup supplement. Per nursing request, adjusted to Strawberry Boost VHC and Ready care shakes with meals. Noted new supplement order today for chocolate shakes with all meals.     Wt 6/8: 121.2 kg  - weight has varied up and down since admit. First bed scale weight was 107 kg.    Malnutrition Risk: At risk with poor PO intake.    Recommendations/Plan:  1. Chocolate Ready care shakes with meals.   2. Encourage intake of meals  3. Document intake of all meals as % taken in ADL's to provide interdisciplinary communication across all shifts.   4. Monitor weight.  5. Nutrition rep will continue to see patient for ongoing meal and snack preferences.    Problem: Nutritional:  Goal: Achieve adequate nutritional intake  Description: Patient will consume >50% of meals.  Outcome: Not met    RD following

## 2022-06-10 NOTE — THERAPY
"Occupational Therapy  Daily Treatment     Patient Name: Jama Altman  Age:  66 y.o., Sex:  male  Medical Record #: [id number]  Today's Date: 6/10/2022     Precautions  Precautions: Fall Risk, Swallow Precautions ( See Comments)    Assessment    Patient seen for OT treat necessitating Max A in bed toileting and Min A supported EOB grooming and Mod A sup<>EOB with cues.     Plan    Continue current treatment plan.    DC Equipment Recommendations: Unable to determine at this time  Discharge Recommendations: Recommend post-acute placement for additional occupational therapy services prior to discharge home       Objective       06/10/22 0740   Balance   Comments best with two person assist for safety given height and poor balance   Bed Mobility    Supine to Sit Moderate Assist   Comments increased time, elevated HOB, cues   Activities of Daily Living   Grooming Minimal Assist;Seated   Bathing Moderate Assist  (seated and bed level, light sponge)   Upper Body Dressing Moderate Assist   Lower Body Dressing Total Assist  (participated as able however < 25%)   Toileting Maximal Assist  (bed level rolling with bed bars)   Comments all performed either supported EOB or bed level   Patient / Family Goals   Patient / Family Goal #1 \"To walk\"   Goal #1 Outcome Goal not met   Short Term Goals   Short Term Goal # 1 Pt will complete ADL transfers with mod A   Goal Outcome # 1 Goal not met   Short Term Goal # 2 Pt will complete gown change with supv   Goal Outcome # 2 Goal not met   Short Term Goal # 3 Pt will complete seated grooming with set-up   Goal Outcome # 3 Goal not met   Session Information   Priority 2     "

## 2022-06-10 NOTE — DISCHARGE PLANNING
[facility] TCN chart review completed. Collaborated with DWAINE López and TASHIA. Pt seen at bedside with family present. Note that family appears generally frustrated with some aspects of care for pt and noted had scheduled care conference in AM though son reports he was unaware of time of conference (relayed information to CM for possible reach out and rescheduling of care conference/concerns or for nursing management to discuss with family).       As prior would note that current plan is for pt to dc to post acute placement (likely SNF level of care at this time). Noted per physiatry patient will need to demonstrate increased tolerance and endurance for IPR level of therapy and currently appears indicated more need for SNF level of care post acute. Per chart and discussion with CM, pt was initially declined from 2/3 SNF choices (awaiting additional response). Discussed with CM and DPA as part of reason for denial was concerns with final dc plan/support. Would note that family reports to this TCN that pt could eventually have 24/7 support from family either in his home or at another family members home (as at baseline pt lives alone). After discussion with family/family and pt preferences, TCN suggested that CM/DPA resend referrals/reach out to initial SNF choices with additional information of long term family support as well as updated therapy notes for consideration of acceptance prior to expanding choice. Note that if pt is denied again from SNFs with additional/new information, family is agreeable to expand choice. TCN will continue to follow and collaborate with discharge planning team as additional post acute needs arise. Thank you.     Previously completed:  - Orders received for: PT and OT -> rec post acute placement  - Choice forms:  SNF, IRF   - [facility] introduced (Y), referral (not sent anticipating placement).

## 2022-06-10 NOTE — PROGRESS NOTES
Hospital Medicine Daily Progress Note    Date of Service  6/10/2022    Chief Complaint  Jama Altman is a 66 y.o. male admitted 5/24/2022 with AMS and fall    Hospital Course  66 y.o. male w/ HTN, DLD, diabetes,  polycystic kidney dz with a renal transplant 9/10/10 and on immunosuppression but has CKD, ADELFO who presented 5/24/2022 with altered mentation.  There was initial concern that he had fallen at home and struck his head.  In the ER he was in atrial fibrillation with a rapid rate of 120-160.  He was given a fluid bolus and initiated on norepinephrine.  He was admitted for severe septic shock likely related to a soft tissue infection of the right leg from where he bumped into something several days ago. During admit he was found to have E.coli bacteremia and right leg cellulitis.  Spinal fluid ws negative for meningitis.  On 5/26 the patient had respiratory distress and was intubated. On 5/28 the patient had PEA arrest and had CPR.  A right knee aspiration was performed and fluid analysis was not showing infection.  On 6/1 the patient was extubated.  The patient had CRRT initiated on 5/27 and daily HD on 5/28 per nephrology notes. Patient shows sign of recovery and has been off dialysis for a few days.  E. coli bacteremia, source likely RLE cellulitis. ID consulted.  Completed iv meropenem with stop date 6/6/2022.     Interval Problem Update  Seen patient at bedside. Still very weak, oral intake suboptimal. On RA.   Restart home medication metoprolol 12.5 mg twice daily with parameters  Increase Lantus to 15 units twice daily  On 1/2 NS.  Renal function stable and improving.  Noted mild hypokalemia, started Veltassa.  Low potassium diet.  Nephrology following  PT/OT  Rehab evaluated the patient yesterday and patient will need to be able to tolerate 3 hours of therapy 5 days a week, and have discharge support    Consultants/Specialty  Infectious disease  Critical care medicine  Nephrology    Code Status  Full  Code    Disposition  SNF    Review of Systems  All systems reviewed and negative except as noted per above.    Physical Exam  Temp:  [36.7 °C (98.1 °F)-37.5 °C (99.5 °F)] 36.7 °C (98.1 °F)  Pulse:  [] 103  Resp:  [18-20] 20  BP: (104-121)/(53-71) 121/59  SpO2:  [90 %-92 %] 91 %    General appearance: NAD, conversant, family at bedside  Eyes: anicteric sclerae, moist conjunctivae; no lid-lag; PERRLA, noted subconjunctival hemorrhage, pronounce on R eye   HENT: Atraumatic; oropharynx clear with moist mucous membranes and no mucosal ulcerations; normal hard and soft palate  Neck: Trachea midline; FROM, supple, no thyromegaly or lymphadenopathy  Lungs: CTA, with normal respiratory effort and no intercostal retractions  CV: RRR, no MRGs  Abdomen: Soft, non-tender; no masses or HSM  Extremities: RLE erythema with dressing noted  Skin: Normal temperature, turgor and texture; no rash, ulcers or subcutaneous nodules  Psych: Answering questions      Current Facility-Administered Medications:   •  insulin GLARGINE (Lantus,Semglee) injection, 15 Units, Subcutaneous, BID, Timo Muñoz M.D., 15 Units at 06/10/22 0821  •  metoprolol tartrate (LOPRESSOR) tablet 12.5 mg, 12.5 mg, Oral, TWICE DAILY, Timo Muñoz M.D., 12.5 mg at 06/10/22 0825  •  patiromer (VELTASSA) powder for oral suspension 8.4 g, 8.4 g, Oral, DAILY, Roderick Ramirez M.D.  •  nystatin (MYCOSTATIN) 797109 UNIT/ML suspension 500,000 Units, 5 mL, Swish & Spit, 4X/DAY, Timo Muñoz M.D., 500,000 Units at 06/10/22 0825  •  1/2 NS infusion, , Intravenous, Continuous, Roderick Ramirez M.D., Last Rate: 50 mL/hr at 06/09/22 0920, New Bag at 06/09/22 0920  •  insulin regular (HumuLIN R,NovoLIN R) injection, 3-14 Units, Subcutaneous, 4X/DAY ACHS, 3 Units at 06/10/22 0821 **AND** POC blood glucose manual result, , , Q AC AND BEDTIME(S) **AND** NOTIFY MD and PharmD, , , Once **AND** Administer 20 grams of glucose (approximately 8 ounces of fruit juice) every 15 minutes PRN  FSBG less than 70 mg/dL, , , PRN **AND** dextrose 50% (D50W) injection 25 g, 25 g, Intravenous, Q15 MIN PRN, Linsey M Wegener, A.P.R.N.  •  acetaminophen (Tylenol) tablet 650 mg, 650 mg, Oral, Q6HRS PRN, Mehrdad Laughlin M.D., 650 mg at 06/05/22 1000  •  tacrolimus (PROGRAF) capsule 1 mg, 1 mg, Oral, BID, Mehrdad Laughlin M.D., 1 mg at 06/10/22 0451  •  senna-docusate (PERICOLACE or SENOKOT S) 8.6-50 MG per tablet 2 Tablet, 2 Tablet, Oral, BID, 2 Tablet at 06/10/22 0451 **AND** polyethylene glycol/lytes (MIRALAX) PACKET 1 Packet, 1 Packet, Oral, QDAY PRN **AND** magnesium hydroxide (MILK OF MAGNESIA) suspension 30 mL, 30 mL, Oral, QDAY PRN **AND** bisacodyl (DULCOLAX) suppository 10 mg, 10 mg, Rectal, QDAY PRN, Mehrdad Laughlin M.D.  •  mycophenolate (CELLCEPT) capsule 500 mg, 500 mg, Oral, BID, Mehrdad Laughlin M.D., 500 mg at 06/10/22 0451  •  oxyCODONE immediate-release (ROXICODONE) tablet 5 mg, 5 mg, Oral, Q4HRS PRN, Mehrdad Laughlin M.D.  •  predniSONE (DELTASONE) tablet 5 mg, 5 mg, Oral, DAILY, Mehrdad Laughlin M.D., 5 mg at 06/10/22 0451  •  heparin injection 5,000 Units, 5,000 Units, Subcutaneous, Q12HRS, James Titus M.D., 5,000 Units at 06/10/22 0451  •  heparin injection 2,800 Units, 2,800 Units, Intracatheter, DIALYSIS PRN, James Sheppard M.D., 2,800 Units at 06/02/22 1400  •  Respiratory Therapy Consult, , Nebulization, Continuous RT, Reuben B. Sheridan, M.D.  •  sodium chloride (OCEAN) 0.65 % nasal spray 2 Spray, 2 Spray, Nasal, Q2HRS PRN, Kayy Hopkins M.D.  •  dakins 0.125% (1/4 strength) topical soln, , Topical, BID, Timo Muñoz M.D., 473 mL at 06/10/22 0453  •  HYDROmorphone (Dilaudid) injection 0.5 mg, 0.5 mg, Intravenous, Q2HRS PRN, Tracey Whitfield M.D., 0.5 mg at 05/31/22 0059      Fluids    Intake/Output Summary (Last 24 hours) at 6/10/2022 1016  Last data filed at 6/10/2022 0900  Gross per 24 hour   Intake 120 ml   Output 2300 ml   Net -2180 ml       Laboratory  Recent Labs     06/08/22  0611  06/09/22  1049 06/10/22  0314   WBC 11.9* 11.8* 11.2*   RBC 3.87* 4.22* 4.38*   HEMOGLOBIN 11.1* 12.1* 12.7*   HEMATOCRIT 35.1* 38.8* 39.9*   MCV 90.7 91.9 91.1   MCH 28.7 28.7 29.0   MCHC 31.6* 31.2* 31.8*   RDW 49.9 50.0 48.8   PLATELETCT 177 239 242   MPV 11.6 11.0 10.8     Recent Labs     06/08/22  0611 06/09/22  1049 06/10/22  0314   SODIUM 146* 144 144   POTASSIUM 4.7 5.7* 5.6*   CHLORIDE 112 109 111   CO2 23 22 22   GLUCOSE 58* 185* 196*   BUN 55* 56* 54*   CREATININE 1.68* 1.84* 1.81*   CALCIUM 7.1* 8.3* 8.3*                   Imaging  CT-HEAD W/O   Final Result      1.  Cerebral atrophy.      2.  Bilateral mastoid effusions.      3.  Otherwise, Head CT without contrast within normal limits. No evidence of acute intracranial hemorrhage or mass lesion.         DX-ABDOMEN FOR TUBE PLACEMENT   Final Result      Enteric feeding tube terminates with the tip projecting over the expected location of the 2nd-3rd portion of the duodenum.      DX-CHEST-LIMITED (1 VIEW)   Final Result      1.  Bibasilar underinflation atelectasis which could obscure an additional process. This is unchanged.   2.  Interstitial opacities likely pulmonary edema   3.  Persistently enlarged cardiac silhouette      DX-CHEST-LIMITED (1 VIEW)   Final Result      1.  Hypoinflation with mildly increased left basilar atelectasis.   2.  Removal of endotracheal tube.      DX-CHEST-LIMITED (1 VIEW)   Final Result         No significant interval change.      DX-CHEST-LIMITED (1 VIEW)   Final Result      Stable areas of patchy atelectasis/consolidation.      DX-CHEST-PORTABLE (1 VIEW)   Final Result      Stable chest x-ray findings.      DX-CHEST-PORTABLE (1 VIEW)   Final Result         1.  Pulmonary edema and/or infiltrates, somewhat increased particularly in the right lung base compared to prior study.   2.  Cardiomegaly      CT-ABDOMEN-PELVIS W/O   Final Result         Limited noncontrast exam      1. Long segment wall thickening from the  descending colon to the sigmoid colon could relate to underdistention or colitis. Air-fluid level in the more proximal colon is likely diarrheal disease.      2. Right lower quadrant transplant kidney with retained prior contrast, likely secondary to renal failure/ATN/contrast nephropathy. No hydronephrosis.      Absent right kidney. Polycystic left kidney.      CT-EXTREMITY, LOWER W/O RIGHT   Final Result      1. Postsurgical changes of right total knee arthroplasty.   2. Right knee joint effusion with thickening of the joint capsule and surrounding soft tissue edema.   3. Circumferential edema involving the soft tissues of the right lower leg with an anterior soft tissue defect and with areas of clustering on the medial right lower leg skin surface.   4. Arteriosclerosis.      DX-CHEST-PORTABLE (1 VIEW)   Final Result      1.  Unchanged position of supporting devices are visualized extent   2.  No visible pneumothorax following chest compressions   3.  Unchanged atelectasis and possible superimposed interstitial pulmonary edema or atypical pneumonia      US-EXTREMITY ARTERY LOWER UNILAT RIGHT   Final Result      US-MIRELLA SINGLE LEVEL BILAT   Final Result      US-EXTREMITY ARTERY LOWER BILAT W/MIRELLA (COMBO)   Final Result      DX-CHEST-PORTABLE (1 VIEW)   Final Result         1.  Pulmonary edema and/or infiltrates, somewhat increased particularly in the right lung base compared to prior study.   2.  Cardiomegaly      DX-ABDOMEN FOR TUBE PLACEMENT   Final Result      1. The tip of the feeding tube terminates over the junction of the gastric pylorus and duodenal bulb.   2. The remainder is stable.      DX-CHEST-PORTABLE (1 VIEW)   Final Result      1. Interval decrease in size of the right pleural effusion.   2. No left pleural effusion.   3. No visible pneumothorax status post bronchoscopy.   4. Interval placement of a Dobbhoff feeding tube.   5. Improving parenchymal loss in the right lung base, incompletely resolved.    6. The remainder is stable.      DX-CHEST-PORTABLE (1 VIEW)   Final Result      1. Interval development of a moderate right pleural effusion after placement of a left internal jugular dialysis catheter, abutting the right lateral wall of the right atrium. Verification of line position with contrast injection under fluoroscopy is    offered.   2. Improved perihilar atelectasis.   3. The remainder is stable.      I, Dr. Matthew Brown, discussed the results of this examination directly by phone with Dr. Reuben Swartz on 5/26/2022 at 0855 hours.      EC-ECHOCARDIOGRAM COMPLETE W/ CONT   Final Result      DX-CHEST-LIMITED (1 VIEW)   Final Result         1. Right internal jugular central venous access catheter placed in the interval terminates over the upper aspect of the right atrium. No postprocedure visible pneumothorax.   2. Stable patchy parenchymal opacities in the lungs, with a stable small left pleural effusion.      DX-CHEST-PORTABLE (1 VIEW)   Final Result         1.  Pulmonary edema and/or infiltrates are identified, which are stable since the prior exam.   2.  Trace bilateral pleural effusions   3.  Cardiomegaly      CT-EXTREMITY, LOWER W/O RIGHT   Final Result      1.  Status post right total knee replacement.      2.  Diffuse atherosclerotic calcification of the arterial system of the right lower extremity suspicious for diabetes mellitus.      3.  Soft tissue attenuation in the subcutaneous tissues of the mid to distal lower leg and foot suspicious for cellulitis.      4.  No evidence of soft tissue ulceration in the right lower leg or foot.      5.  No evidence of acute osteomyelitis in the right lower leg or foot.      6.  Small subcutaneous abscesses cannot be excluded without the use of intravenous contrast.      DX-CHEST-PORTABLE (1 VIEW)   Final Result      Left internal jugular central venous access catheter terminates over a persistent left superior vena cava. No postprocedure visible  pneumothorax.      The remainder is stable.      DX-TIBIA AND FIBULA RIGHT   Final Result      1. Right lower leg soft tissue swelling.   2. No acute fracture or subluxation.   3. Postsurgical changes of right total knee arthroplasty.      US-ABDOMEN F.A.S.T. LTD (FOR ED USE ONLY)   Final Result      No free fluid seen in all 4 quadrants.      Negative FAST scan.            CT-ABDOMEN-PELVIS WITH   Final Result      1. No acute posttraumatic findings in the abdomen or pelvis.   2. Bibasilar subsegmental atelectasis.   3. Right pelvic transplant kidney without hydronephrosis.   4. Polycystic left kidney.   5. Diverticulosis without diverticulitis. Normal appendix.      CT-CTA CHEST PULMONARY ARTERY W/ RECONS   Final Result      1.  No CT evidence of pulmonary emboli.      2.  Bibasilar atelectasis.      3.  No evidence of rib fracture.      4. Diffuse coronary artery calcification.      CT-HEAD W/O   Final Result      1.  Cerebral atrophy.      2.  Otherwise, Head CT without contrast within normal limits. No evidence of acute cerebral infarction, hemorrhage or mass lesion.         CT-CSPINE WITHOUT PLUS RECONS   Final Result      1.  Moderate osteoarthritic changes at the C6-7 level with disc space narrowing and marginal osteophytosis. Further there is moderate cervical spondylotic changes at this level.      2.  No evidence of cervical spine fracture and/or subluxation.      DX-PELVIS-1 OR 2 VIEWS   Final Result      1.  Unremarkable single AP view of the pelvis.      DX-CHEST-LIMITED (1 VIEW)   Final Result      1.  Curvilinear perihilar opacities likely atelectasis and/or parenchymal scarring.      2.  Mild cardiomegaly.           Assessment/Plan  * Septic shock (HCC)- (present on admission)  Assessment & Plan  Sec to E.coli bacteremia from RLE cellulitis  Completed abx   Resolved    Bacteremia due to Escherichia coli- (present on admission)  Assessment & Plan  Blood culture pos E coli on 5/24. Repeated blood  culture negative to date. Source likely sec to RLE cellulitis  Completed 10 days course of IV meropenem (started on 5/28 given new fever and worsening chest x-ray) on 6/6  ID consulted     Acute renal failure superimposed on stage 3a chronic kidney disease ([facility])  Assessment & Plan  Nephrology consulting  Hemodialysis as per nephrology. Has been off HD as renal function improving  Monitor vitals, I/O's, labs  Avoid nephrotoxins.  Immunosuppression for hx of renal transplant    Hyperkalemia  Assessment & Plan  Low potassium diet  On Veltassa  Continue to monitor    Knee effusion, right  Assessment & Plan  s/p bedside right knee joint aspiration on 5/28.  Synovial fluid analysis reveals hazy priscila fluid, 2638 WBCs with PMN predominance and no crystals seen.  Fluid not consistent with infection.  Culture neg    Cardiac arrest ([facility])  Assessment & Plan  S/p PEA  Monitor labs, vitals.    Acute metabolic encephalopathy  Assessment & Plan  Improving.  Monitor neurochecks  Treating infection as likely etiology    Immunosuppression due to chronic steroid use ([facility])  Assessment & Plan  Restart immunosuppression as per nephrology  On antibiotics for infection.    Acute respiratory failure with hypoxia ([facility])  Assessment & Plan  Intubated 5/26 and extubated 6/1  Mobilize as able  Respiratory protocol  Supplemental oxygen and wean as able      PAF (paroxysmal atrial fibrillation) ([facility])- (present on admission)  Assessment & Plan  Hx of pAFib , not on OAC at home.   Outpatient metoprolol 25mg daily on hold due to septic shock and borderline hypotension.  Resumed metoprolol 12.5 mg twice daily on 6/10 with holding parameters  Outpatient cardiology/PCP follow up regards OAC use.     Type 2 diabetes mellitus with hyperlipidemia ([facility])- (present on admission)  Assessment & Plan  On lantus and SSI. Holding home po diabetes meds  Hypoglycemic protocol   A1c 10.7    Thrombocytopenia ([facility])- (present on admission)  Assessment & Plan  Resolved         VTE prophylaxis: heparin ppx    I have performed a physical exam and reviewed and updated ROS and Plan today (6/10/2022). In review of yesterday's note (6/9/2022), there are no changes except as documented above.

## 2022-06-10 NOTE — DISCHARGE PLANNING
Agency/Facility Name: [facility]   Spoke To: Edison   Outcome: Per Admissions Coordinator, patient needs to progress in therapy due to the fact he is a max assist and was currently on a cortrak.     Agency/Facility Name: [facility]   Spoke To: Sue   Outcome: DPA left voicemail     Agency/Facility Name: [facility]   Spoke To: Misty   Outcome: DPA left voicemail

## 2022-06-10 NOTE — CARE PLAN
The patient is Stable - Low risk of patient condition declining or worsening    Shift Goals  Clinical Goals: titrate oxygen  Patient Goals: rest  Family Goals: N/A    Progress made toward(s) clinical / shift goals:  pt educated on their plan of care and medications    Problem: Knowledge Deficit - Standard  Goal: Patient and family/care givers will demonstrate understanding of plan of care, disease process/condition, diagnostic tests and medications  Outcome: Progressing     Problem: Pain - Standard  Goal: Alleviation of pain or a reduction in pain to the patient’s comfort goal  Outcome: Progressing     Problem: Skin Integrity  Goal: Skin integrity is maintained or improved  Outcome: Progressing     Problem: Fall Risk  Goal: Patient will remain free from falls  Outcome: Progressing     Problem: Discharge Barriers/Planning  Goal: Patient's continuum of care needs are met  Outcome: Progressing     Problem: Urinary Elimination  Goal: Establish and maintain regular urinary output  Outcome: Progressing     Problem: Mobility  Goal: Patient's capacity to carry out activities will improve  Outcome: Progressing     Problem: Self Care  Goal: Patient will have the ability to perform ADLs independently or with assistance (bathe, groom, dress, toilet and feed)  Outcome: Progressing     Problem: Infection - Standard  Goal: Patient will remain free from infection  Outcome: Progressing     Problem: Wound/ / Incision Healing  Goal: Patient's wound/surgical incision will decrease in size and heals properly  Outcome: Progressing       Patient is not progressing towards the following goals:

## 2022-06-10 NOTE — PROGRESS NOTES
[facility] -  PROGRESS NOTE               Author: Roderick Ramirez M.D. Date & Time: 6/10/2022  9:34 AM     HPI:  Patient is a 66 year old male with a PMHx of renal transplant 9/10/2010 for polycystic kidney disease, DM, thrombocytopenia, CKD3b, pafib, covid19 infection, who presents for AMS.  He was brought in activated trauma for fall, CT imaging has been negative, but was in severe septic shock started on pressors and give nfluid boluses.  On broad spectrum abx.  Currently complains of right leg pain but thinks its better.  Confirms his last dose of tacrolimus and cellcept was yesterday.  Currently on levophed    DAILY NEPHROLOGY SUMMARY:  5/24: Consult done  5/25: NAEO, no complaints, feels a bit better, family at bedside  5/26: Decompensated overnight, intubated due to resp. Distress and pressors initiated  5/27: NAEO, no complaints, hemodynamics decompensated and CRRT initiated instead, tolerated well overnight, still on it this AM  5/28: transitioned to daily iHD for now, +13.8L for hospitalization, remains intubated, on pressor support, Tmax 101.1F, WBC 28.9  5/29: tolerated HD, UF 3L 5/28, remains intubated and on pressor support  5/30: Tolerated UF, improved hemodynamics, remains on pressors, remains on mechanical ventilation   5/31: Seen on Dialysis, vasc cath sluggish despite TPA, only able to run   6/1: Tolerated HD, extubated, UOP increasing-2.7L yesterday, net neg 5L  6/2: Intermittent encephalopathy per wife,  2.5L UOP yesterday, soft Bps, feeling stronger today  6/3: low Bps, brisk UOP-2.6L, with minimal PO intake, thirsty, NO UF with HD, labs pending  6/4: Denies pain or SOB.  UOP 1.5L  No new labs  6/5: Denies pain or SOB.  Feels thirsty.  Creatinine down to 2.4.  6/6: Denies pain or SOB.  Feels thirsty.  No new labs today.  UOP at 3.1L  6/7: Denies pain or SOB.  Drinking thickened water.  Creatinine down to 2.  6/8: Creatinine continues to trend down.  Still has  "dialysis catheter in place  6/9: No new labs this morning.  On thickened liquids still.  Denies pain or SOB.  Temp HD cath removed.  6/10: Creatinine 1.8, still on thickened liquid.  Potassium 5.6.  Denies pain or SOB    REVIEW OF SYSTEMS:    Limited due to pt communication     PMH/PSH/SH/FH:   Reviewed and unchanged since admission note    CURRENT MEDICATIONS:   Reviewed from admission to present day    VS:  VS:  /59   Pulse (!) 103   Temp 36.7 °C (98.1 °F) (Temporal)   Resp 20   Ht 1.956 m (6' 5\")   Wt 121 kg (267 lb 3.2 oz)   SpO2 91%   BMI 31.68 kg/m²   GENERAL: Ill appearing  CV: no pedal edema  RESP:non-labored  GI: Soft  MSK: No joint deformities   SKIN: ecchymoses  NEURO: No tremor  PSYCH: Deferred    Fluids:  In: -   Out: 3600     LABS:  Recent Labs     06/08/22  0611 06/09/22  1049 06/10/22  0314   SODIUM 146* 144 144   POTASSIUM 4.7 5.7* 5.6*   CHLORIDE 112 109 111   CO2 23 22 22   GLUCOSE 58* 185* 196*   BUN 55* 56* 54*   CREATININE 1.68* 1.84* 1.81*   CALCIUM 7.1* 8.3* 8.3*     IMAGING:   All imaging reviewed from admission to present day    IMPRESSION:  # SANKET  - Etiology likely 2/2 ATN  - has received contrast  - RRT dependent since 5/27, now with signs of recovery  # DDKT  - 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 Coli bacteremia/septic shock  - Source unclear, ?colitis on imaging  - Abx on board  - pressor support  # Hyperkalemia, improved  - No ECG changes  # Encephalopathy  - Etiology likely 2/2 hypoperfusion/sepsis  # Acute respiratory failure  - Intubated 5/26  # RLE ulcer  - Trauma  # type 2 DM  # thrombocytopenia  - worsening    PLAN:  -Start Veltassa PO daily  -Ensure low K diet  -Recheck K this evening  -Continue 1/2NS at 50cc/hr  -Repeat BMP tomorrow  -Holding lasix  -tacrolimus 1mg BID, MMF  -Daily labs and weights  -renal formula tube feeds  -Monitor I/O  -Dose all meds per eGFR     Thank " you

## 2022-06-10 NOTE — THERAPY
"Speech Language Pathology  Daily Treatment     Patient Name: Jama Altman  Age:  66 y.o., Sex:  male  Medical Record #: [id number]  Today's Date: 6/10/2022     Precautions  Precautions: Fall Risk, Swallow Precautions ( See Comments)    Assessment    6/8 CT results includes no evidence of acute ICH or mass lesion. Pt with dried lesions underneath left naris and left side of upper and lower lips. Moderate verbal cues to maintain eye opening. Pt with frequent jerking type movement of his upper and lower body. Pt able to state room and place with the year \"1992.\" When year provided, pt stating \"no, I don't think so.\" HOB to high as tolerated and near 70 degrees. Pt given sips of thickened water and applesauce with one swallow triggered in 2-3 seconds. One congested sounding cough with pt cued to dry swallow. Related to pt's high risk for silent aspiration per FEES and current sleepy and altered s/s, no upgrade recommended. Per PT, family adding ice to pt's thickened water and pt with coughing earlier in the day. Ice in thickened water at bedside tray. Swallow strategies posted to please not add ice to thickened water since it thins it to less than thickened liquids. Nurs also informed of no ice to thickener.     Plan  PU4/MT2 1-1 feeding. Family educ regarding current swallow status and recommendations.  Continue current treatment plan.    Discharge Recommendations: Recommend post-acute placement for additional speech therapy services prior to discharge home         06/10/22 1535   Patient / Family Goals   Patient / Family Goal #1 \"water\"   Goal #1 Outcome Progressing as expected   Short Term Goals   Short Term Goal # 1 B  Pt will consume PU4/MT2 without overt signs of aspiration given Elliott.  (progressing as expected)   Short Term Goal # 2 Pt will complete swallow exercises targeting BoT retraction given Elliott.   Goal Outcome # 2  Goal not met   Short Term Goal # 3 Pt will complete swallow exercises targeting pharyngeal " constriction and LVC given Elliott.   Goal Outcome  # 3 Goal not met

## 2022-06-10 NOTE — CARE PLAN
The patient is Stable - Low risk of patient condition declining or worsening    Shift Goals  Clinical Goals: titrate oxygen, monitor mentation  Patient Goals: rest  Family Goals: n/a    Progress made toward(s) clinical / shift goals:    Problem: Knowledge Deficit - Standard  Goal: Patient and family/care givers will demonstrate understanding of plan of care, disease process/condition, diagnostic tests and medications  Outcome: Progressing     Problem: Pain - Standard  Goal: Alleviation of pain or a reduction in pain to the patient’s comfort goal  Outcome: Progressing     Problem: Skin Integrity  Goal: Skin integrity is maintained or improved  Outcome: Progressing     Problem: Fall Risk  Goal: Patient will remain free from falls  Outcome: Progressing       Patient is not progressing towards the following goals:

## 2022-06-10 NOTE — THERAPY
Missed Therapy Physical Therapy Contact Note    Patient Name: Jama Altman  Age:  66 y.o., Sex:  male  Medical Record #: [id number]  Today's Date: 6/10/2022    Came for planned family meeting at 9 am, pre-planned by MD and family to discuss PT POC.  No family present, spoke with RN, OT, and PT supervisor.  Waited on floor for 32 minutes, unable to continue waiting due to time constraints of case load.    Stephenie Jones, PT, DPT

## 2022-06-11 LAB
ANION GAP SERPL CALC-SCNC: 11 MMOL/L (ref 7–16)
BUN SERPL-MCNC: 56 MG/DL (ref 8–22)
CALCIUM SERPL-MCNC: 8.1 MG/DL (ref 8.5–10.5)
CHLORIDE SERPL-SCNC: 111 MMOL/L (ref 96–112)
CO2 SERPL-SCNC: 21 MMOL/L (ref 20–33)
CREAT SERPL-MCNC: 1.91 MG/DL (ref 0.5–1.4)
GFR SERPLBLD CREATININE-BSD FMLA CKD-EPI: 38 ML/MIN/1.73 M 2
GLUCOSE BLD STRIP.AUTO-MCNC: 134 MG/DL (ref 65–99)
GLUCOSE BLD STRIP.AUTO-MCNC: 184 MG/DL (ref 65–99)
GLUCOSE BLD STRIP.AUTO-MCNC: 185 MG/DL (ref 65–99)
GLUCOSE BLD STRIP.AUTO-MCNC: 185 MG/DL (ref 65–99)
GLUCOSE SERPL-MCNC: 132 MG/DL (ref 65–99)
POTASSIUM SERPL-SCNC: 5.3 MMOL/L (ref 3.6–5.5)
SODIUM SERPL-SCNC: 143 MMOL/L (ref 135–145)

## 2022-06-11 PROCEDURE — 80048 BASIC METABOLIC PNL TOTAL CA: CPT

## 2022-06-11 PROCEDURE — A9270 NON-COVERED ITEM OR SERVICE: HCPCS | Performed by: STUDENT IN AN ORGANIZED HEALTH CARE EDUCATION/TRAINING PROGRAM

## 2022-06-11 PROCEDURE — 82962 GLUCOSE BLOOD TEST: CPT

## 2022-06-11 PROCEDURE — 36415 COLL VENOUS BLD VENIPUNCTURE: CPT

## 2022-06-11 PROCEDURE — 770001 HCHG ROOM/CARE - MED/SURG/GYN PRIV*

## 2022-06-11 PROCEDURE — 700105 HCHG RX REV CODE 258: Performed by: STUDENT IN AN ORGANIZED HEALTH CARE EDUCATION/TRAINING PROGRAM

## 2022-06-11 PROCEDURE — 97530 THERAPEUTIC ACTIVITIES: CPT

## 2022-06-11 PROCEDURE — 700102 HCHG RX REV CODE 250 W/ 637 OVERRIDE(OP): Performed by: STUDENT IN AN ORGANIZED HEALTH CARE EDUCATION/TRAINING PROGRAM

## 2022-06-11 PROCEDURE — A9270 NON-COVERED ITEM OR SERVICE: HCPCS | Performed by: HOSPITALIST

## 2022-06-11 PROCEDURE — 700102 HCHG RX REV CODE 250 W/ 637 OVERRIDE(OP): Performed by: HOSPITALIST

## 2022-06-11 PROCEDURE — 99232 SBSQ HOSP IP/OBS MODERATE 35: CPT | Performed by: STUDENT IN AN ORGANIZED HEALTH CARE EDUCATION/TRAINING PROGRAM

## 2022-06-11 PROCEDURE — 94760 N-INVAS EAR/PLS OXIMETRY 1: CPT

## 2022-06-11 PROCEDURE — 700111 HCHG RX REV CODE 636 W/ 250 OVERRIDE (IP): Performed by: STUDENT IN AN ORGANIZED HEALTH CARE EDUCATION/TRAINING PROGRAM

## 2022-06-11 PROCEDURE — 700111 HCHG RX REV CODE 636 W/ 250 OVERRIDE (IP): Performed by: HOSPITALIST

## 2022-06-11 RX ADMIN — NYSTATIN 500000 UNITS: 100000 SUSPENSION ORAL at 12:08

## 2022-06-11 RX ADMIN — SODIUM BICARBONATE: 84 INJECTION, SOLUTION INTRAVENOUS at 20:59

## 2022-06-11 RX ADMIN — METOPROLOL TARTRATE 12.5 MG: 25 TABLET, FILM COATED ORAL at 17:51

## 2022-06-11 RX ADMIN — TACROLIMUS 1 MG: 1 CAPSULE ORAL at 06:22

## 2022-06-11 RX ADMIN — PREDNISONE 5 MG: 5 TABLET ORAL at 06:22

## 2022-06-11 RX ADMIN — SODIUM BICARBONATE: 84 INJECTION, SOLUTION INTRAVENOUS at 11:04

## 2022-06-11 RX ADMIN — HEPARIN SODIUM 5000 UNITS: 5000 INJECTION, SOLUTION INTRAVENOUS; SUBCUTANEOUS at 06:28

## 2022-06-11 RX ADMIN — MYCOPHENOLATE MOFETIL 500 MG: 250 CAPSULE ORAL at 06:21

## 2022-06-11 RX ADMIN — MYCOPHENOLATE MOFETIL 500 MG: 250 CAPSULE ORAL at 17:51

## 2022-06-11 RX ADMIN — PATIROMER 8.4 G: 8.4 POWDER, FOR SUSPENSION ORAL at 07:10

## 2022-06-11 RX ADMIN — DOCUSATE SODIUM 50 MG AND SENNOSIDES 8.6 MG 2 TABLET: 8.6; 5 TABLET, FILM COATED ORAL at 06:21

## 2022-06-11 RX ADMIN — DAKIN'S SOLUTION 0.125% (QUARTER STRENGTH) 473 ML: 0.12 SOLUTION at 04:01

## 2022-06-11 RX ADMIN — NYSTATIN 500000 UNITS: 100000 SUSPENSION ORAL at 17:50

## 2022-06-11 RX ADMIN — METOPROLOL TARTRATE 12.5 MG: 25 TABLET, FILM COATED ORAL at 06:21

## 2022-06-11 RX ADMIN — DOCUSATE SODIUM 50 MG AND SENNOSIDES 8.6 MG 2 TABLET: 8.6; 5 TABLET, FILM COATED ORAL at 17:51

## 2022-06-11 RX ADMIN — NYSTATIN 500000 UNITS: 100000 SUSPENSION ORAL at 08:30

## 2022-06-11 RX ADMIN — HEPARIN SODIUM 5000 UNITS: 5000 INJECTION, SOLUTION INTRAVENOUS; SUBCUTANEOUS at 17:51

## 2022-06-11 RX ADMIN — SODIUM CHLORIDE: 4.5 INJECTION, SOLUTION INTRAVENOUS at 08:51

## 2022-06-11 RX ADMIN — TACROLIMUS 1 MG: 1 CAPSULE ORAL at 17:51

## 2022-06-11 RX ADMIN — DAKIN'S SOLUTION 0.125% (QUARTER STRENGTH) 473 ML: 0.12 SOLUTION at 15:15

## 2022-06-11 ASSESSMENT — GAIT ASSESSMENTS: GAIT LEVEL OF ASSIST: UNABLE TO PARTICIPATE

## 2022-06-11 ASSESSMENT — COGNITIVE AND FUNCTIONAL STATUS - GENERAL
TURNING FROM BACK TO SIDE WHILE IN FLAT BAD: UNABLE
WALKING IN HOSPITAL ROOM: TOTAL
MOVING TO AND FROM BED TO CHAIR: UNABLE
SUGGESTED CMS G CODE MODIFIER MOBILITY: CN
MOVING FROM LYING ON BACK TO SITTING ON SIDE OF FLAT BED: UNABLE
MOBILITY SCORE: 6
CLIMB 3 TO 5 STEPS WITH RAILING: TOTAL
STANDING UP FROM CHAIR USING ARMS: TOTAL

## 2022-06-11 ASSESSMENT — PAIN DESCRIPTION - PAIN TYPE
TYPE: ACUTE PAIN

## 2022-06-11 ASSESSMENT — FIBROSIS 4 INDEX: FIB4 SCORE: 1.32

## 2022-06-11 NOTE — DISCHARGE PLANNING
Current documentation does not exhibit the ability to participate/tolerate IRF level of [place].  [facility] will no longer follow.

## 2022-06-11 NOTE — CARE PLAN
The patient is Stable - Low risk of patient condition declining or worsening    Shift Goals  Clinical Goals: titrate oxygen, monitor mentation  Patient Goals: rest  Family Goals: n/a    Progress made toward(s) clinical / shift goals:    Problem: Pain - Standard  Goal: Alleviation of pain or a reduction in pain to the patient’s comfort goal  Outcome: Progressing     Problem: Skin Integrity  Goal: Skin integrity is maintained or improved  Outcome: Progressing     Problem: Fall Risk  Goal: Patient will remain free from falls  Outcome: Progressing       Patient is not progressing towards the following goals:

## 2022-06-11 NOTE — PROGRESS NOTES
Hospital Medicine Daily Progress Note    Date of Service  6/11/2022    Chief Complaint  Jama Altman is a 66 y.o. male admitted 5/24/2022 with AMS and fall    Hospital Course  66 y.o. male w/ HTN, DLD, diabetes,  polycystic kidney dz with a renal transplant 9/10/10 and on immunosuppression but has CKD, ADELFO who presented 5/24/2022 with altered mentation.  There was initial concern that he had fallen at home and struck his head.  In the ER he was in atrial fibrillation with a rapid rate of 120-160.  He was given a fluid bolus and initiated on norepinephrine.  He was admitted for severe septic shock likely related to a soft tissue infection of the right leg from where he bumped into something several days ago. During admit he was found to have E.coli bacteremia and right leg cellulitis.  Spinal fluid ws negative for meningitis.  On 5/26 the patient had respiratory distress and was intubated. On 5/28 the patient had PEA arrest and had CPR.  A right knee aspiration was performed and fluid analysis was not showing infection.  On 6/1 the patient was extubated.  The patient had CRRT initiated on 5/27 and daily HD on 5/28 per nephrology notes. Patient shows sign of recovery and has been off dialysis for a few days.  E. coli bacteremia, source likely RLE cellulitis. ID consulted.  Completed iv meropenem with stop date 6/6/2022.     Interval Problem Update  Seen patient at bedside. Still very weak, oral intake suboptimal.  On supplements.  Encouraged oral intake  Restart home medication metoprolol 12.5 mg twice daily with parameters  Has bowel movements this morning  Hyperkalemia improved, on Veltassa  IV fluid has been adjusted with D5 with 1 amp of bicarb by nephrology  PT/OT recommend postacute  Rehab evaluated the patient yesterday and patient will need to be able to tolerate 3 hours of therapy 5 days a week, and have discharge support    Consultants/Specialty  Infectious disease  Critical care  medicine  Nephrology    Code Status  Full Code    Disposition  SNF    Review of Systems  All systems reviewed and negative except as noted per above.    Physical Exam  Temp:  [36.1 °C (97 °F)-37.4 °C (99.3 °F)] 36.1 °C (97 °F)  Pulse:  [] 99  Resp:  [17-19] 17  BP: (107-122)/(53-73) 115/73  SpO2:  [92 %-96 %] 96 %    General appearance: NAD, conversant, family at bedside  Eyes: anicteric sclerae, moist conjunctivae; no lid-lag; PERRLA, noted subconjunctival hemorrhage, pronounce on R eye   HENT: Atraumatic; oropharynx clear with moist mucous membranes and no mucosal ulcerations; normal hard and soft palate  Neck: Trachea midline; FROM, supple, no thyromegaly or lymphadenopathy  Lungs: CTA, with normal respiratory effort and no intercostal retractions  CV: RRR, no MRGs  Abdomen: Soft, non-tender; no masses or HSM  Extremities: RLE erythema with dressing noted  Skin: Normal temperature, turgor and texture; no rash, ulcers or subcutaneous nodules  Psych: Answering questions      Current Facility-Administered Medications:   •  sodium bicarbonate 8.4 % 50 mEq in dextrose 5% 1,000 mL infusion, , Intravenous, Continuous, Roderick Ramirez M.D.  •  insulin GLARGINE (Lantus,Semglee) injection, 15 Units, Subcutaneous, BID, Timo Muñoz M.D., 15 Units at 06/11/22 0827  •  metoprolol tartrate (LOPRESSOR) tablet 12.5 mg, 12.5 mg, Oral, TWICE DAILY, Timo Muñoz M.D., 12.5 mg at 06/11/22 0621  •  patiromer (VELTASSA) powder for oral suspension 8.4 g, 8.4 g, Oral, DAILY, Roderick Ramirez M.D., 8.4 g at 06/11/22 0710  •  nystatin (MYCOSTATIN) 855359 UNIT/ML suspension 500,000 Units, 5 mL, Swish & Spit, 4X/DAY, Timo Muñoz M.D., 500,000 Units at 06/11/22 0830  •  insulin regular (HumuLIN R,NovoLIN R) injection, 3-14 Units, Subcutaneous, 4X/DAY ACHS, 3 Units at 06/10/22 2037 **AND** POC blood glucose manual result, , , Q AC AND BEDTIME(S) **AND** NOTIFY MD and PharmD, , , Once **AND** Administer 20 grams of glucose (approximately 8  ounces of fruit juice) every 15 minutes PRN FSBG less than 70 mg/dL, , , PRN **AND** dextrose 50% (D50W) injection 25 g, 25 g, Intravenous, Q15 MIN PRN, Linsey M Wegener, A.P.R.N.  •  acetaminophen (Tylenol) tablet 650 mg, 650 mg, Oral, Q6HRS PRN, Mehrdad Laughlin M.D., 650 mg at 06/05/22 1000  •  tacrolimus (PROGRAF) capsule 1 mg, 1 mg, Oral, BID, Mehrdad Laughlin M.D., 1 mg at 06/11/22 0622  •  senna-docusate (PERICOLACE or SENOKOT S) 8.6-50 MG per tablet 2 Tablet, 2 Tablet, Oral, BID, 2 Tablet at 06/11/22 0621 **AND** polyethylene glycol/lytes (MIRALAX) PACKET 1 Packet, 1 Packet, Oral, QDAY PRN **AND** [DISCONTINUED] magnesium hydroxide (MILK OF MAGNESIA) suspension 30 mL, 30 mL, Oral, QDAY PRN **AND** bisacodyl (DULCOLAX) suppository 10 mg, 10 mg, Rectal, QDAY PRN, Mehrdad Laughlin M.D.  •  mycophenolate (CELLCEPT) capsule 500 mg, 500 mg, Oral, BID, Mehrdad Laughlin M.D., 500 mg at 06/11/22 0621  •  oxyCODONE immediate-release (ROXICODONE) tablet 5 mg, 5 mg, Oral, Q4HRS PRN, Mehrdad Laughlin M.D.  •  predniSONE (DELTASONE) tablet 5 mg, 5 mg, Oral, DAILY, Mehrdad Laughlin M.D., 5 mg at 06/11/22 0622  •  heparin injection 5,000 Units, 5,000 Units, Subcutaneous, Q12HRS, James Titus M.D., 5,000 Units at 06/11/22 0628  •  heparin injection 2,800 Units, 2,800 Units, Intracatheter, DIALYSIS PRN, James Sheppard M.D., 2,800 Units at 06/02/22 1400  •  Respiratory Therapy Consult, , Nebulization, Continuous RT, Reuben Swartz M.D.  •  sodium chloride (OCEAN) 0.65 % nasal spray 2 Spray, 2 Spray, Nasal, Q2HRS PRN, Kayy Hopkins M.D.  •  dakins 0.125% (1/4 strength) topical soln, , Topical, BID, Timo Muñoz M.D., 473 mL at 06/11/22 0401  •  HYDROmorphone (Dilaudid) injection 0.5 mg, 0.5 mg, Intravenous, Q2HRS PRN, Tracey Whitfield M.D., 0.5 mg at 05/31/22 0059      Fluids    Intake/Output Summary (Last 24 hours) at 6/11/2022 1002  Last data filed at 6/11/2022 0600  Gross per 24 hour   Intake 780 ml   Output 400 ml   Net  380 ml       Laboratory  Recent Labs     06/09/22  1049 06/10/22  0314   WBC 11.8* 11.2*   RBC 4.22* 4.38*   HEMOGLOBIN 12.1* 12.7*   HEMATOCRIT 38.8* 39.9*   MCV 91.9 91.1   MCH 28.7 29.0   MCHC 31.2* 31.8*   RDW 50.0 48.8   PLATELETCT 239 242   MPV 11.0 10.8     Recent Labs     06/09/22  1049 06/10/22  0314 06/11/22  0106   SODIUM 144 144 143   POTASSIUM 5.7* 5.6* 5.3   CHLORIDE 109 111 111   CO2 22 22 21   GLUCOSE 185* 196* 132*   BUN 56* 54* 56*   CREATININE 1.84* 1.81* 1.91*   CALCIUM 8.3* 8.3* 8.1*                   Imaging  CT-HEAD W/O   Final Result      1.  Cerebral atrophy.      2.  Bilateral mastoid effusions.      3.  Otherwise, Head CT without contrast within normal limits. No evidence of acute intracranial hemorrhage or mass lesion.         DX-ABDOMEN FOR TUBE PLACEMENT   Final Result      Enteric feeding tube terminates with the tip projecting over the expected location of the 2nd-3rd portion of the duodenum.      DX-CHEST-LIMITED (1 VIEW)   Final Result      1.  Bibasilar underinflation atelectasis which could obscure an additional process. This is unchanged.   2.  Interstitial opacities likely pulmonary edema   3.  Persistently enlarged cardiac silhouette      DX-CHEST-LIMITED (1 VIEW)   Final Result      1.  Hypoinflation with mildly increased left basilar atelectasis.   2.  Removal of endotracheal tube.      DX-CHEST-LIMITED (1 VIEW)   Final Result         No significant interval change.      DX-CHEST-LIMITED (1 VIEW)   Final Result      Stable areas of patchy atelectasis/consolidation.      DX-CHEST-PORTABLE (1 VIEW)   Final Result      Stable chest x-ray findings.      DX-CHEST-PORTABLE (1 VIEW)   Final Result         1.  Pulmonary edema and/or infiltrates, somewhat increased particularly in the right lung base compared to prior study.   2.  Cardiomegaly      CT-ABDOMEN-PELVIS W/O   Final Result         Limited noncontrast exam      1. Long segment wall thickening from the descending colon to  the sigmoid colon could relate to underdistention or colitis. Air-fluid level in the more proximal colon is likely diarrheal disease.      2. Right lower quadrant transplant kidney with retained prior contrast, likely secondary to renal failure/ATN/contrast nephropathy. No hydronephrosis.      Absent right kidney. Polycystic left kidney.      CT-EXTREMITY, LOWER W/O RIGHT   Final Result      1. Postsurgical changes of right total knee arthroplasty.   2. Right knee joint effusion with thickening of the joint capsule and surrounding soft tissue edema.   3. Circumferential edema involving the soft tissues of the right lower leg with an anterior soft tissue defect and with areas of clustering on the medial right lower leg skin surface.   4. Arteriosclerosis.      DX-CHEST-PORTABLE (1 VIEW)   Final Result      1.  Unchanged position of supporting devices are visualized extent   2.  No visible pneumothorax following chest compressions   3.  Unchanged atelectasis and possible superimposed interstitial pulmonary edema or atypical pneumonia      US-EXTREMITY ARTERY LOWER UNILAT RIGHT   Final Result      US-MIRELLA SINGLE LEVEL BILAT   Final Result      US-EXTREMITY ARTERY LOWER BILAT W/MIRELLA (COMBO)   Final Result      DX-CHEST-PORTABLE (1 VIEW)   Final Result         1.  Pulmonary edema and/or infiltrates, somewhat increased particularly in the right lung base compared to prior study.   2.  Cardiomegaly      DX-ABDOMEN FOR TUBE PLACEMENT   Final Result      1. The tip of the feeding tube terminates over the junction of the gastric pylorus and duodenal bulb.   2. The remainder is stable.      DX-CHEST-PORTABLE (1 VIEW)   Final Result      1. Interval decrease in size of the right pleural effusion.   2. No left pleural effusion.   3. No visible pneumothorax status post bronchoscopy.   4. Interval placement of a Dobbhoff feeding tube.   5. Improving parenchymal loss in the right lung base, incompletely resolved.   6. The remainder  is stable.      DX-CHEST-PORTABLE (1 VIEW)   Final Result      1. Interval development of a moderate right pleural effusion after placement of a left internal jugular dialysis catheter, abutting the right lateral wall of the right atrium. Verification of line position with contrast injection under fluoroscopy is    offered.   2. Improved perihilar atelectasis.   3. The remainder is stable.      I, Dr. Matthew Brown, discussed the results of this examination directly by phone with Dr. Reuben Swartz on 5/26/2022 at 0855 hours.      EC-ECHOCARDIOGRAM COMPLETE W/ CONT   Final Result      DX-CHEST-LIMITED (1 VIEW)   Final Result         1. Right internal jugular central venous access catheter placed in the interval terminates over the upper aspect of the right atrium. No postprocedure visible pneumothorax.   2. Stable patchy parenchymal opacities in the lungs, with a stable small left pleural effusion.      DX-CHEST-PORTABLE (1 VIEW)   Final Result         1.  Pulmonary edema and/or infiltrates are identified, which are stable since the prior exam.   2.  Trace bilateral pleural effusions   3.  Cardiomegaly      CT-EXTREMITY, LOWER W/O RIGHT   Final Result      1.  Status post right total knee replacement.      2.  Diffuse atherosclerotic calcification of the arterial system of the right lower extremity suspicious for diabetes mellitus.      3.  Soft tissue attenuation in the subcutaneous tissues of the mid to distal lower leg and foot suspicious for cellulitis.      4.  No evidence of soft tissue ulceration in the right lower leg or foot.      5.  No evidence of acute osteomyelitis in the right lower leg or foot.      6.  Small subcutaneous abscesses cannot be excluded without the use of intravenous contrast.      DX-CHEST-PORTABLE (1 VIEW)   Final Result      Left internal jugular central venous access catheter terminates over a persistent left superior vena cava. No postprocedure visible pneumothorax.      The  remainder is stable.      DX-TIBIA AND FIBULA RIGHT   Final Result      1. Right lower leg soft tissue swelling.   2. No acute fracture or subluxation.   3. Postsurgical changes of right total knee arthroplasty.      US-ABDOMEN F.A.S.T. LTD (FOR ED USE ONLY)   Final Result      No free fluid seen in all 4 quadrants.      Negative FAST scan.            CT-ABDOMEN-PELVIS WITH   Final Result      1. No acute posttraumatic findings in the abdomen or pelvis.   2. Bibasilar subsegmental atelectasis.   3. Right pelvic transplant kidney without hydronephrosis.   4. Polycystic left kidney.   5. Diverticulosis without diverticulitis. Normal appendix.      CT-CTA CHEST PULMONARY ARTERY W/ RECONS   Final Result      1.  No CT evidence of pulmonary emboli.      2.  Bibasilar atelectasis.      3.  No evidence of rib fracture.      4. Diffuse coronary artery calcification.      CT-HEAD W/O   Final Result      1.  Cerebral atrophy.      2.  Otherwise, Head CT without contrast within normal limits. No evidence of acute cerebral infarction, hemorrhage or mass lesion.         CT-CSPINE WITHOUT PLUS RECONS   Final Result      1.  Moderate osteoarthritic changes at the C6-7 level with disc space narrowing and marginal osteophytosis. Further there is moderate cervical spondylotic changes at this level.      2.  No evidence of cervical spine fracture and/or subluxation.      DX-PELVIS-1 OR 2 VIEWS   Final Result      1.  Unremarkable single AP view of the pelvis.      DX-CHEST-LIMITED (1 VIEW)   Final Result      1.  Curvilinear perihilar opacities likely atelectasis and/or parenchymal scarring.      2.  Mild cardiomegaly.           Assessment/Plan  * Septic shock (HCC)- (present on admission)  Assessment & Plan  Sec to E.coli bacteremia from RLE cellulitis  Completed abx   Resolved    Bacteremia due to Escherichia coli- (present on admission)  Assessment & Plan  Blood culture pos E coli on 5/24. Repeated blood culture negative to date.  Source likely sec to RLE cellulitis  Completed 10 days course of IV meropenem (started on 5/28 given new fever and worsening chest x-ray) on 6/6  ID consulted     Acute renal failure superimposed on stage 3a chronic kidney disease ([facility])  Assessment & Plan  Nephrology consulting  Hemodialysis as per nephrology. Has been off HD as renal function improving, dialysis catheter has been removed  On IV fluid  Monitor vitals, I/O's, labs  Avoid nephrotoxins.  Immunosuppression for hx of renal transplant    Hyperkalemia  Assessment & Plan  Low potassium diet  On Veltassa  Continue to monitor    Knee effusion, right  Assessment & Plan  s/p bedside right knee joint aspiration on 5/28.  Synovial fluid analysis reveals hazy priscila fluid, 2638 WBCs with PMN predominance and no crystals seen.  Fluid not consistent with infection.  Culture neg    Cardiac arrest ([facility])  Assessment & Plan  S/p PEA  Monitor labs, vitals.    Acute metabolic encephalopathy  Assessment & Plan  Improving.  Monitor neurochecks  Treating infection as likely etiology    Immunosuppression due to chronic steroid use ([facility])  Assessment & Plan  Restart immunosuppression as per nephrology  On antibiotics for infection.    Acute respiratory failure with hypoxia ([facility])  Assessment & Plan  Intubated 5/26 and extubated 6/1  Mobilize as able  Respiratory protocol  Supplemental oxygen and wean as able      PAF (paroxysmal atrial fibrillation) ([facility])- (present on admission)  Assessment & Plan  Hx of pAFib , not on OAC at home.   Outpatient metoprolol 25mg daily on hold due to septic shock and borderline hypotension.  Resumed metoprolol 12.5 mg twice daily on 6/10 with holding parameters  Outpatient cardiology/PCP follow up regards OAC use.     Type 2 diabetes mellitus with hyperlipidemia ([facility])- (present on admission)  Assessment & Plan  On lantus and SSI. Holding home po diabetes meds  Hypoglycemic protocol   A1c 10.7    Thrombocytopenia ([facility])- (present on  admission)  Assessment & Plan  Resolved        VTE prophylaxis: heparin ppx    I have performed a physical exam and reviewed and updated ROS and Plan today (6/11/2022). In review of yesterday's note (6/10/2022), there are no changes except as documented above.

## 2022-06-11 NOTE — PROGRESS NOTES
[facility] -  PROGRESS NOTE               Author: Roderick Ramirez M.D. Date & Time: 6/11/2022  10:02 AM     HPI:  Patient is a 66 year old male with a PMHx of renal transplant 9/10/2010 for polycystic kidney disease, DM, thrombocytopenia, CKD3b, pafib, covid19 infection, who presents for AMS.  He was brought in activated trauma for fall, CT imaging has been negative, but was in severe septic shock started on pressors and give nfluid boluses.  On broad spectrum abx.  Currently complains of right leg pain but thinks its better.  Confirms his last dose of tacrolimus and cellcept was yesterday.  Currently on levophed    DAILY NEPHROLOGY SUMMARY:  5/24: Consult done  5/25: NAEO, no complaints, feels a bit better, family at bedside  5/26: Decompensated overnight, intubated due to resp. Distress and pressors initiated  5/27: NAEO, no complaints, hemodynamics decompensated and CRRT initiated instead, tolerated well overnight, still on it this AM  5/28: transitioned to daily iHD for now, +13.8L for hospitalization, remains intubated, on pressor support, Tmax 101.1F, WBC 28.9  5/29: tolerated HD, UF 3L 5/28, remains intubated and on pressor support  5/30: Tolerated UF, improved hemodynamics, remains on pressors, remains on mechanical ventilation   5/31: Seen on Dialysis, vasc cath sluggish despite TPA, only able to run   6/1: Tolerated HD, extubated, UOP increasing-2.7L yesterday, net neg 5L  6/2: Intermittent encephalopathy per wife,  2.5L UOP yesterday, soft Bps, feeling stronger today  6/3: low Bps, brisk UOP-2.6L, with minimal PO intake, thirsty, NO UF with HD, labs pending  6/4: Denies pain or SOB.  UOP 1.5L  No new labs  6/5: Denies pain or SOB.  Feels thirsty.  Creatinine down to 2.4.  6/6: Denies pain or SOB.  Feels thirsty.  No new labs today.  UOP at 3.1L  6/7: Denies pain or SOB.  Drinking thickened water.  Creatinine down to 2.  6/8: Creatinine continues to trend down.  Still has  "dialysis catheter in place  6/9: No new labs this morning.  On thickened liquids still.  Denies pain or SOB.  Temp HD cath removed.  6/10: Creatinine 1.8, still on thickened liquid.  Potassium 5.6.  Denies pain or SOB  6/11: Denies pain or SOB.  Creatinine 1.9.  Potassium 5.3    REVIEW OF SYSTEMS:    Limited due to pt communication     PMH/PSH/SH/FH:   Reviewed and unchanged since admission note    CURRENT MEDICATIONS:   Reviewed from admission to present day    VS:  VS:  /73   Pulse 99   Temp 36.1 °C (97 °F) (Temporal)   Resp 17   Ht 1.956 m (6' 5\")   Wt 121 kg (267 lb 3.2 oz)   SpO2 96%   BMI 31.68 kg/m²   GENERAL: Ill appearing  CV: no pedal edema  RESP:non-labored  GI: Soft  MSK: No joint deformities   SKIN: ecchymoses  NEURO: No tremor  PSYCH: Deferred    Fluids:  In: 900 [P.O.:900]  Out: 1250     LABS:  Recent Labs     06/09/22  1049 06/10/22  0314 06/11/22  0106   SODIUM 144 144 143   POTASSIUM 5.7* 5.6* 5.3   CHLORIDE 109 111 111   CO2 22 22 21   GLUCOSE 185* 196* 132*   BUN 56* 54* 56*   CREATININE 1.84* 1.81* 1.91*   CALCIUM 8.3* 8.3* 8.1*     IMAGING:   All imaging reviewed from admission to present day    IMPRESSION:  # SANKET  - Etiology likely 2/2 ATN  - has received contrast  - RRT dependent since 5/27, now with signs of recovery  # DDKT  - 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 Coli bacteremia/septic shock  - Source unclear, ?colitis on imaging  - Abx on board  - pressor support  # Hyperkalemia, improved  - No ECG changes  # Encephalopathy  - Etiology likely 2/2 hypoperfusion/sepsis  # Acute respiratory failure  - Intubated 5/26  # RLE ulcer  - Trauma  # type 2 DM  # thrombocytopenia  - worsening    PLAN:  -C/w veltassa PO daily  -Ensure low K diet  -Increased IVF to 100cc/hr  -Repeat BMP tomorrow  -Holding lasix  -tacrolimus 1mg BID, MMF, Tac levels tomorrow  -Daily labs and weights  -renal formula " tube feeds  -Monitor I/O  -Dose all meds per eGFR     Thank you

## 2022-06-11 NOTE — THERAPY
"Physical Therapy   Daily Treatment     Patient Name: Jama Altman  Age:  66 y.o., Sex:  male  Medical Record #: [id number]  Today's Date: 6/11/2022     Precautions  Precautions: Fall Risk;Swallow Precautions ( See Comments)    Assessment    Rec'd pt alert, in bed, friend at bedside.  Agreeable to work w/ PT.  Max assist to sit eob, w/ trace assist from pt w/ LE's.  He did not assist w/ use of his UE's despite extensive education and facilitation to \"pull\" on PT.  Once assisted into eob sitting, he is able to maintain his balance w/o assist and w/o use of UE's.  Attempted sit to stand three times.  Initially, pt w/ NO participation.  After a length discussion, he was noted to have trace participation, however, remains unable to elevate buttocks from the bed.  Attempted to have pt sit eob w/ friend at bedside, per nsg OK, however, pt became too lethargic to maintain his sitting balance and was returned back to supine w/ total assist.      Plan    Continue current treatment plan.    DC Equipment Recommendations: Unable to determine at this time  Discharge Recommendations: Recommend post-acute placement for additional physical therapy services prior to discharge home        Objective       06/11/22 0903   Balance   Sitting Balance (Static) Fair -   Sitting Balance (Dynamic) Fair -   Standing Balance (Static)   (unable to come to standing)   Weight Shift Sitting Poor   Weight Shift Standing Absent   Skilled Intervention Verbal Cuing;Tactile Cuing;Facilitation   Gait Analysis   Gait Level Of Assist Unable to Participate   Bed Mobility    Supine to Sit Maximal Assist   Sit to Supine Total Assist   Scooting Total Assist   Rolling Maximal Assist to Rt.;Maximum Assist to Lt.   Skilled Intervention Verbal Cuing;Tactile Cuing;Sequencing;Facilitation;Compensatory Strategies   Functional Mobility   Sit to Stand Total Assist   Comments unable to elevate buttocks off the bed   Short Term Goals    Short Term Goal # 1 Pt will " perform supine <> sit without bed features within 6 visits in order to progress toward PLOF   Goal Outcome # 1 goal not met   Short Term Goal # 2 Pt will perform STS with FWW and min A within 6 visits in order to progress OOB mobility   Goal Outcome # 2 Goal not met   Short Term Goal # 3 Pt will perform functional transfers with FWW and min A within 6 visits in order to progress OOB mobility   Goal Outcome # 3 Goal not met   Short Term Goal # 4 Pt will ambulate 100 ft with FWW and mod A within 6 visits in order to progress functional mobility   Goal Outcome # 4 Goal not met   Anticipated Discharge Equipment and Recommendations   DC Equipment Recommendations Unable to determine at this time   Discharge Recommendations Recommend post-acute placement for additional physical therapy services prior to discharge home

## 2022-06-11 NOTE — CARE PLAN
The patient is Stable - Low risk of patient condition declining or worsening    Shift Goals  Clinical Goals: titrate oxygen, monitor BS  Patient Goals: rest  Family Goals: N/A    Progress made toward(s) clinical / shift goals:  Pt was educated on importance of their medications  Problem: Knowledge Deficit - Standard  Goal: Patient and family/care givers will demonstrate understanding of plan of care, disease process/condition, diagnostic tests and medications  Outcome: Progressing     Problem: Pain - Standard  Goal: Alleviation of pain or a reduction in pain to the patient’s comfort goal  Outcome: Progressing     Problem: Skin Integrity  Goal: Skin integrity is maintained or improved  Outcome: Progressing     Problem: Fall Risk  Goal: Patient will remain free from falls  Outcome: Progressing     Problem: Discharge Barriers/Planning  Goal: Patient's continuum of care needs are met  Outcome: Progressing     Problem: Urinary Elimination  Goal: Establish and maintain regular urinary output  Outcome: Progressing     Problem: Mobility  Goal: Patient's capacity to carry out activities will improve  Outcome: Progressing     Problem: Self Care  Goal: Patient will have the ability to perform ADLs independently or with assistance (bathe, groom, dress, toilet and feed)  Outcome: Progressing     Problem: Infection - Standard  Goal: Patient will remain free from infection  Outcome: Progressing     Problem: Wound/ / Incision Healing  Goal: Patient's wound/surgical incision will decrease in size and heals properly  Outcome: Progressing       Patient is not progressing towards the following goals:

## 2022-06-11 NOTE — PROGRESS NOTES
Received report from NOC RN and assumed care of patient. Pt A&Ox2, disoriented to time and situation. Pt denies pain. Call light within reach. Bed locked and in low position.

## 2022-06-12 LAB
ANION GAP SERPL CALC-SCNC: 10 MMOL/L (ref 7–16)
BUN SERPL-MCNC: 57 MG/DL (ref 8–22)
CALCIUM SERPL-MCNC: 8.1 MG/DL (ref 8.5–10.5)
CHLORIDE SERPL-SCNC: 108 MMOL/L (ref 96–112)
CO2 SERPL-SCNC: 22 MMOL/L (ref 20–33)
CREAT SERPL-MCNC: 1.8 MG/DL (ref 0.5–1.4)
ERYTHROCYTE [DISTWIDTH] IN BLOOD BY AUTOMATED COUNT: 50.7 FL (ref 35.9–50)
GFR SERPLBLD CREATININE-BSD FMLA CKD-EPI: 41 ML/MIN/1.73 M 2
GLUCOSE BLD STRIP.AUTO-MCNC: 167 MG/DL (ref 65–99)
GLUCOSE BLD STRIP.AUTO-MCNC: 231 MG/DL (ref 65–99)
GLUCOSE BLD STRIP.AUTO-MCNC: 237 MG/DL (ref 65–99)
GLUCOSE BLD STRIP.AUTO-MCNC: 238 MG/DL (ref 65–99)
GLUCOSE SERPL-MCNC: 186 MG/DL (ref 65–99)
HCT VFR BLD AUTO: 33.2 % (ref 42–52)
HGB BLD-MCNC: 10.2 G/DL (ref 14–18)
MCH RBC QN AUTO: 28.3 PG (ref 27–33)
MCHC RBC AUTO-ENTMCNC: 30.7 G/DL (ref 33.7–35.3)
MCV RBC AUTO: 92 FL (ref 81.4–97.8)
PLATELET # BLD AUTO: 178 K/UL (ref 164–446)
PMV BLD AUTO: 11.4 FL (ref 9–12.9)
POTASSIUM SERPL-SCNC: 5 MMOL/L (ref 3.6–5.5)
RBC # BLD AUTO: 3.61 M/UL (ref 4.7–6.1)
SODIUM SERPL-SCNC: 140 MMOL/L (ref 135–145)
WBC # BLD AUTO: 7.6 K/UL (ref 4.8–10.8)

## 2022-06-12 PROCEDURE — 80048 BASIC METABOLIC PNL TOTAL CA: CPT

## 2022-06-12 PROCEDURE — 700102 HCHG RX REV CODE 250 W/ 637 OVERRIDE(OP): Performed by: STUDENT IN AN ORGANIZED HEALTH CARE EDUCATION/TRAINING PROGRAM

## 2022-06-12 PROCEDURE — 770001 HCHG ROOM/CARE - MED/SURG/GYN PRIV*

## 2022-06-12 PROCEDURE — 700111 HCHG RX REV CODE 636 W/ 250 OVERRIDE (IP): Performed by: STUDENT IN AN ORGANIZED HEALTH CARE EDUCATION/TRAINING PROGRAM

## 2022-06-12 PROCEDURE — A9270 NON-COVERED ITEM OR SERVICE: HCPCS | Performed by: STUDENT IN AN ORGANIZED HEALTH CARE EDUCATION/TRAINING PROGRAM

## 2022-06-12 PROCEDURE — A9270 NON-COVERED ITEM OR SERVICE: HCPCS | Performed by: HOSPITALIST

## 2022-06-12 PROCEDURE — 80197 ASSAY OF TACROLIMUS: CPT

## 2022-06-12 PROCEDURE — 36415 COLL VENOUS BLD VENIPUNCTURE: CPT

## 2022-06-12 PROCEDURE — 700111 HCHG RX REV CODE 636 W/ 250 OVERRIDE (IP): Performed by: HOSPITALIST

## 2022-06-12 PROCEDURE — 700105 HCHG RX REV CODE 258: Performed by: STUDENT IN AN ORGANIZED HEALTH CARE EDUCATION/TRAINING PROGRAM

## 2022-06-12 PROCEDURE — 99232 SBSQ HOSP IP/OBS MODERATE 35: CPT | Performed by: STUDENT IN AN ORGANIZED HEALTH CARE EDUCATION/TRAINING PROGRAM

## 2022-06-12 PROCEDURE — 85027 COMPLETE CBC AUTOMATED: CPT

## 2022-06-12 PROCEDURE — 82962 GLUCOSE BLOOD TEST: CPT | Mod: 91

## 2022-06-12 PROCEDURE — 700102 HCHG RX REV CODE 250 W/ 637 OVERRIDE(OP): Performed by: HOSPITALIST

## 2022-06-12 RX ADMIN — TACROLIMUS 1 MG: 1 CAPSULE ORAL at 17:48

## 2022-06-12 RX ADMIN — METOPROLOL TARTRATE 12.5 MG: 25 TABLET, FILM COATED ORAL at 05:27

## 2022-06-12 RX ADMIN — SODIUM BICARBONATE: 84 INJECTION, SOLUTION INTRAVENOUS at 18:33

## 2022-06-12 RX ADMIN — NYSTATIN 500000 UNITS: 100000 SUSPENSION ORAL at 17:47

## 2022-06-12 RX ADMIN — SODIUM BICARBONATE: 84 INJECTION, SOLUTION INTRAVENOUS at 08:46

## 2022-06-12 RX ADMIN — DAKIN'S SOLUTION 0.125% (QUARTER STRENGTH) 473 ML: 0.12 SOLUTION at 02:34

## 2022-06-12 RX ADMIN — DAKIN'S SOLUTION 0.125% (QUARTER STRENGTH) 473 ML: 0.12 SOLUTION at 14:29

## 2022-06-12 RX ADMIN — DOCUSATE SODIUM 50 MG AND SENNOSIDES 8.6 MG 2 TABLET: 8.6; 5 TABLET, FILM COATED ORAL at 17:48

## 2022-06-12 RX ADMIN — NYSTATIN 500000 UNITS: 100000 SUSPENSION ORAL at 08:11

## 2022-06-12 RX ADMIN — METOPROLOL TARTRATE 12.5 MG: 25 TABLET, FILM COATED ORAL at 17:48

## 2022-06-12 RX ADMIN — NYSTATIN 500000 UNITS: 100000 SUSPENSION ORAL at 21:12

## 2022-06-12 RX ADMIN — HEPARIN SODIUM 5000 UNITS: 5000 INJECTION, SOLUTION INTRAVENOUS; SUBCUTANEOUS at 17:47

## 2022-06-12 RX ADMIN — PREDNISONE 5 MG: 5 TABLET ORAL at 05:28

## 2022-06-12 RX ADMIN — DOCUSATE SODIUM 50 MG AND SENNOSIDES 8.6 MG 2 TABLET: 8.6; 5 TABLET, FILM COATED ORAL at 05:26

## 2022-06-12 RX ADMIN — Medication 2 SPRAY: at 18:28

## 2022-06-12 RX ADMIN — NYSTATIN 500000 UNITS: 100000 SUSPENSION ORAL at 12:28

## 2022-06-12 RX ADMIN — MYCOPHENOLATE MOFETIL 500 MG: 250 CAPSULE ORAL at 05:28

## 2022-06-12 RX ADMIN — TACROLIMUS 1 MG: 1 CAPSULE ORAL at 05:27

## 2022-06-12 RX ADMIN — MYCOPHENOLATE MOFETIL 500 MG: 250 CAPSULE ORAL at 17:47

## 2022-06-12 RX ADMIN — HEPARIN SODIUM 5000 UNITS: 5000 INJECTION, SOLUTION INTRAVENOUS; SUBCUTANEOUS at 05:28

## 2022-06-12 ASSESSMENT — PAIN DESCRIPTION - PAIN TYPE: TYPE: ACUTE PAIN

## 2022-06-12 NOTE — PROGRESS NOTES
Hospital Medicine Daily Progress Note    Date of Service  6/12/2022    Chief Complaint  Jama Altman is a 66 y.o. male admitted 5/24/2022 with AMS and fall    Hospital Course  66 y.o. male w/ HTN, DLD, diabetes,  polycystic kidney dz with a renal transplant 9/10/10 and on immunosuppression but has CKD, ADELFO who presented 5/24/2022 with altered mentation.  There was initial concern that he had fallen at home and struck his head.  In the ER he was in atrial fibrillation with a rapid rate of 120-160.  He was given a fluid bolus and initiated on norepinephrine.  He was admitted for severe septic shock likely related to a soft tissue infection of the right leg from where he bumped into something several days ago. During admit he was found to have E.coli bacteremia and right leg cellulitis.  Spinal fluid ws negative for meningitis.  On 5/26 the patient had respiratory distress and was intubated. On 5/28 the patient had PEA arrest and had CPR.  A right knee aspiration was performed and fluid analysis was not showing infection.  On 6/1 the patient was extubated.  The patient had CRRT initiated on 5/27 and daily HD on 5/28 per nephrology notes. Patient shows sign of recovery and has been off dialysis for a few days.  E. coli bacteremia, source likely RLE cellulitis. ID consulted.  Completed iv meropenem with stop date 6/6/2022.     Interval Problem Update  Seen patient at bedside. Still very weak, oral intake suboptimal.  On supplements.  Encouraged oral intake.  Nutrition consulted.  Continue IV fluids per nephrology  PT/OT recommend postacute  Rehab evaluated the patient yesterday and patient will need to be able to tolerate 3 hours of therapy 5 days a week, and have discharge support    Consultants/Specialty  Infectious disease  Critical care medicine  Nephrology    Code Status  Full Code    Disposition  SNF    Review of Systems  All systems reviewed and negative except as noted per above.    Physical Exam  Temp:   [36.3 °C (97.3 °F)-36.9 °C (98.4 °F)] 36.7 °C (98 °F)  Pulse:  [65-99] 65  Resp:  [16-18] 18  BP: ()/(49-70) 107/49  SpO2:  [93 %-96 %] 95 %    General appearance: NAD, conversant, family at bedside  Eyes: anicteric sclerae, moist conjunctivae; no lid-lag; PERRLA, noted subconjunctival hemorrhage, pronounce on R eye   HENT: Atraumatic; oropharynx clear with moist mucous membranes and no mucosal ulcerations; normal hard and soft palate  Neck: Trachea midline; FROM, supple, no thyromegaly or lymphadenopathy  Lungs: CTA, with normal respiratory effort and no intercostal retractions  CV: RRR, no MRGs  Abdomen: Soft, non-tender; no masses or HSM  Extremities: RLE erythema with dressing noted  Skin: Normal temperature, turgor and texture; no rash, ulcers or subcutaneous nodules  Psych: Answering questions      Current Facility-Administered Medications:   •  sodium bicarbonate 8.4 % 50 mEq in dextrose 5% 1,000 mL infusion, , Intravenous, Continuous, Roderick Ramirez M.D., Last Rate: 100 mL/hr at 06/12/22 0846, New Bag at 06/12/22 0846  •  insulin GLARGINE (Lantus,Semglee) injection, 15 Units, Subcutaneous, BID, Timo Muñoz M.D., 15 Units at 06/12/22 0808  •  metoprolol tartrate (LOPRESSOR) tablet 12.5 mg, 12.5 mg, Oral, TWICE DAILY, Timo Muñoz M.D., 12.5 mg at 06/12/22 0527  •  patiromer (VELTASSA) powder for oral suspension 8.4 g, 8.4 g, Oral, DAILY, Roderick Ramirez M.D., 8.4 g at 06/11/22 0710  •  nystatin (MYCOSTATIN) 102676 UNIT/ML suspension 500,000 Units, 5 mL, Swish & Spit, 4X/DAY, Timo Muñoz M.D., 500,000 Units at 06/12/22 0811  •  insulin regular (HumuLIN R,NovoLIN R) injection, 3-14 Units, Subcutaneous, 4X/DAY ACHS, 3 Units at 06/12/22 0809 **AND** POC blood glucose manual result, , , Q AC AND BEDTIME(S) **AND** NOTIFY MD and PharmD, , , Once **AND** Administer 20 grams of glucose (approximately 8 ounces of fruit juice) every 15 minutes PRN FSBG less than 70 mg/dL, , , PRN **AND** dextrose 50% (D50W)  injection 25 g, 25 g, Intravenous, Q15 MIN PRN, Linsey M Wegener, A.P.R.N.  •  acetaminophen (Tylenol) tablet 650 mg, 650 mg, Oral, Q6HRS PRN, Mehrdad Laughlin M.D., 650 mg at 06/05/22 1000  •  tacrolimus (PROGRAF) capsule 1 mg, 1 mg, Oral, BID, Mehrdad Laughlin M.D., 1 mg at 06/12/22 0527  •  senna-docusate (PERICOLACE or SENOKOT S) 8.6-50 MG per tablet 2 Tablet, 2 Tablet, Oral, BID, 2 Tablet at 06/12/22 0526 **AND** polyethylene glycol/lytes (MIRALAX) PACKET 1 Packet, 1 Packet, Oral, QDAY PRN **AND** [DISCONTINUED] magnesium hydroxide (MILK OF MAGNESIA) suspension 30 mL, 30 mL, Oral, QDAY PRN **AND** bisacodyl (DULCOLAX) suppository 10 mg, 10 mg, Rectal, QDAY PRN, Mehrdad Laughlin M.D.  •  mycophenolate (CELLCEPT) capsule 500 mg, 500 mg, Oral, BID, Mehrdad Laughlin M.D., 500 mg at 06/12/22 0528  •  oxyCODONE immediate-release (ROXICODONE) tablet 5 mg, 5 mg, Oral, Q4HRS PRN, Mehrdad Laughlin M.D.  •  predniSONE (DELTASONE) tablet 5 mg, 5 mg, Oral, DAILY, Mehrdad Laughlin M.D., 5 mg at 06/12/22 0528  •  heparin injection 5,000 Units, 5,000 Units, Subcutaneous, Q12HRS, James Titus M.D., 5,000 Units at 06/12/22 0528  •  heparin injection 2,800 Units, 2,800 Units, Intracatheter, DIALYSIS PRN, James Sheppard M.D., 2,800 Units at 06/02/22 1400  •  Respiratory Therapy Consult, , Nebulization, Continuous RT, Reuben Swartz M.D.  •  sodium chloride (OCEAN) 0.65 % nasal spray 2 Spray, 2 Spray, Nasal, Q2HRS PRN, Kayy Hopkins M.D.  •  dakins 0.125% (1/4 strength) topical soln, , Topical, BID, Timo Muñoz M.D., 473 mL at 06/12/22 0234  •  HYDROmorphone (Dilaudid) injection 0.5 mg, 0.5 mg, Intravenous, Q2HRS PRN, Tracey Whitfield M.D., 0.5 mg at 05/31/22 0059      Fluids    Intake/Output Summary (Last 24 hours) at 6/12/2022 1032  Last data filed at 6/12/2022 0400  Gross per 24 hour   Intake 120 ml   Output 700 ml   Net -580 ml       Laboratory  Recent Labs     06/09/22  1049 06/10/22  0314 06/12/22  0405   WBC 11.8* 11.2*  7.6   RBC 4.22* 4.38* 3.61*   HEMOGLOBIN 12.1* 12.7* 10.2*   HEMATOCRIT 38.8* 39.9* 33.2*   MCV 91.9 91.1 92.0   MCH 28.7 29.0 28.3   MCHC 31.2* 31.8* 30.7*   RDW 50.0 48.8 50.7*   PLATELETCT 239 242 178   MPV 11.0 10.8 11.4     Recent Labs     06/10/22  0314 06/11/22  0106 06/12/22  0405   SODIUM 144 143 140   POTASSIUM 5.6* 5.3 5.0   CHLORIDE 111 111 108   CO2 22 21 22   GLUCOSE 196* 132* 186*   BUN 54* 56* 57*   CREATININE 1.81* 1.91* 1.80*   CALCIUM 8.3* 8.1* 8.1*                   Imaging  CT-HEAD W/O   Final Result      1.  Cerebral atrophy.      2.  Bilateral mastoid effusions.      3.  Otherwise, Head CT without contrast within normal limits. No evidence of acute intracranial hemorrhage or mass lesion.         DX-ABDOMEN FOR TUBE PLACEMENT   Final Result      Enteric feeding tube terminates with the tip projecting over the expected location of the 2nd-3rd portion of the duodenum.      DX-CHEST-LIMITED (1 VIEW)   Final Result      1.  Bibasilar underinflation atelectasis which could obscure an additional process. This is unchanged.   2.  Interstitial opacities likely pulmonary edema   3.  Persistently enlarged cardiac silhouette      DX-CHEST-LIMITED (1 VIEW)   Final Result      1.  Hypoinflation with mildly increased left basilar atelectasis.   2.  Removal of endotracheal tube.      DX-CHEST-LIMITED (1 VIEW)   Final Result         No significant interval change.      DX-CHEST-LIMITED (1 VIEW)   Final Result      Stable areas of patchy atelectasis/consolidation.      DX-CHEST-PORTABLE (1 VIEW)   Final Result      Stable chest x-ray findings.      DX-CHEST-PORTABLE (1 VIEW)   Final Result         1.  Pulmonary edema and/or infiltrates, somewhat increased particularly in the right lung base compared to prior study.   2.  Cardiomegaly      CT-ABDOMEN-PELVIS W/O   Final Result         Limited noncontrast exam      1. Long segment wall thickening from the descending colon to the sigmoid colon could relate to  underdistention or colitis. Air-fluid level in the more proximal colon is likely diarrheal disease.      2. Right lower quadrant transplant kidney with retained prior contrast, likely secondary to renal failure/ATN/contrast nephropathy. No hydronephrosis.      Absent right kidney. Polycystic left kidney.      CT-EXTREMITY, LOWER W/O RIGHT   Final Result      1. Postsurgical changes of right total knee arthroplasty.   2. Right knee joint effusion with thickening of the joint capsule and surrounding soft tissue edema.   3. Circumferential edema involving the soft tissues of the right lower leg with an anterior soft tissue defect and with areas of clustering on the medial right lower leg skin surface.   4. Arteriosclerosis.      DX-CHEST-PORTABLE (1 VIEW)   Final Result      1.  Unchanged position of supporting devices are visualized extent   2.  No visible pneumothorax following chest compressions   3.  Unchanged atelectasis and possible superimposed interstitial pulmonary edema or atypical pneumonia      US-EXTREMITY ARTERY LOWER UNILAT RIGHT   Final Result      US-MIRELLA SINGLE LEVEL BILAT   Final Result      US-EXTREMITY ARTERY LOWER BILAT W/MIRELLA (COMBO)   Final Result      DX-CHEST-PORTABLE (1 VIEW)   Final Result         1.  Pulmonary edema and/or infiltrates, somewhat increased particularly in the right lung base compared to prior study.   2.  Cardiomegaly      DX-ABDOMEN FOR TUBE PLACEMENT   Final Result      1. The tip of the feeding tube terminates over the junction of the gastric pylorus and duodenal bulb.   2. The remainder is stable.      DX-CHEST-PORTABLE (1 VIEW)   Final Result      1. Interval decrease in size of the right pleural effusion.   2. No left pleural effusion.   3. No visible pneumothorax status post bronchoscopy.   4. Interval placement of a Dobbhoff feeding tube.   5. Improving parenchymal loss in the right lung base, incompletely resolved.   6. The remainder is stable.      DX-CHEST-PORTABLE  (1 VIEW)   Final Result      1. Interval development of a moderate right pleural effusion after placement of a left internal jugular dialysis catheter, abutting the right lateral wall of the right atrium. Verification of line position with contrast injection under fluoroscopy is    offered.   2. Improved perihilar atelectasis.   3. The remainder is stable.      I, Dr. Matthew Brown, discussed the results of this examination directly by phone with Dr. Reuben Swartz on 5/26/2022 at 0855 hours.      EC-ECHOCARDIOGRAM COMPLETE W/ CONT   Final Result      DX-CHEST-LIMITED (1 VIEW)   Final Result         1. Right internal jugular central venous access catheter placed in the interval terminates over the upper aspect of the right atrium. No postprocedure visible pneumothorax.   2. Stable patchy parenchymal opacities in the lungs, with a stable small left pleural effusion.      DX-CHEST-PORTABLE (1 VIEW)   Final Result         1.  Pulmonary edema and/or infiltrates are identified, which are stable since the prior exam.   2.  Trace bilateral pleural effusions   3.  Cardiomegaly      CT-EXTREMITY, LOWER W/O RIGHT   Final Result      1.  Status post right total knee replacement.      2.  Diffuse atherosclerotic calcification of the arterial system of the right lower extremity suspicious for diabetes mellitus.      3.  Soft tissue attenuation in the subcutaneous tissues of the mid to distal lower leg and foot suspicious for cellulitis.      4.  No evidence of soft tissue ulceration in the right lower leg or foot.      5.  No evidence of acute osteomyelitis in the right lower leg or foot.      6.  Small subcutaneous abscesses cannot be excluded without the use of intravenous contrast.      DX-CHEST-PORTABLE (1 VIEW)   Final Result      Left internal jugular central venous access catheter terminates over a persistent left superior vena cava. No postprocedure visible pneumothorax.      The remainder is stable.      DX-TIBIA  AND FIBULA RIGHT   Final Result      1. Right lower leg soft tissue swelling.   2. No acute fracture or subluxation.   3. Postsurgical changes of right total knee arthroplasty.      US-ABDOMEN F.A.S.T. LTD (FOR ED USE ONLY)   Final Result      No free fluid seen in all 4 quadrants.      Negative FAST scan.            CT-ABDOMEN-PELVIS WITH   Final Result      1. No acute posttraumatic findings in the abdomen or pelvis.   2. Bibasilar subsegmental atelectasis.   3. Right pelvic transplant kidney without hydronephrosis.   4. Polycystic left kidney.   5. Diverticulosis without diverticulitis. Normal appendix.      CT-CTA CHEST PULMONARY ARTERY W/ RECONS   Final Result      1.  No CT evidence of pulmonary emboli.      2.  Bibasilar atelectasis.      3.  No evidence of rib fracture.      4. Diffuse coronary artery calcification.      CT-HEAD W/O   Final Result      1.  Cerebral atrophy.      2.  Otherwise, Head CT without contrast within normal limits. No evidence of acute cerebral infarction, hemorrhage or mass lesion.         CT-CSPINE WITHOUT PLUS RECONS   Final Result      1.  Moderate osteoarthritic changes at the C6-7 level with disc space narrowing and marginal osteophytosis. Further there is moderate cervical spondylotic changes at this level.      2.  No evidence of cervical spine fracture and/or subluxation.      DX-PELVIS-1 OR 2 VIEWS   Final Result      1.  Unremarkable single AP view of the pelvis.      DX-CHEST-LIMITED (1 VIEW)   Final Result      1.  Curvilinear perihilar opacities likely atelectasis and/or parenchymal scarring.      2.  Mild cardiomegaly.           Assessment/Plan  * Septic shock (HCC)- (present on admission)  Assessment & Plan  Sec to E.coli bacteremia from RLE cellulitis  Completed abx   Resolved    Bacteremia due to Escherichia coli- (present on admission)  Assessment & Plan  Blood culture pos E coli on 5/24. Repeated blood culture negative to date. Source likely sec to RLE  cellulitis  Completed 10 days course of IV meropenem (started on 5/28 given new fever and worsening chest x-ray) on 6/6  ID consulted     Acute renal failure superimposed on stage 3a chronic kidney disease ([facility])  Assessment & Plan  Nephrology consulting  Hemodialysis as per nephrology. Has been off HD as renal function improving, dialysis catheter has been removed  On IV fluid  Monitor vitals, I/O's, labs  Avoid nephrotoxins.  Immunosuppression for hx of renal transplant    Hyperkalemia  Assessment & Plan  Low potassium diet  On Veltassa  Continue to monitor    Knee effusion, right  Assessment & Plan  s/p bedside right knee joint aspiration on 5/28.  Synovial fluid analysis reveals hazy priscila fluid, 2638 WBCs with PMN predominance and no crystals seen.  Fluid not consistent with infection.  Culture neg    Cardiac arrest ([facility])  Assessment & Plan  S/p PEA  Monitor labs, vitals.    Acute metabolic encephalopathy  Assessment & Plan  Improving.  Monitor neurochecks  Treating infection as likely etiology    Immunosuppression due to chronic steroid use ([facility])  Assessment & Plan  Restart immunosuppression as per nephrology  On antibiotics for infection.    Acute respiratory failure with hypoxia ([facility])  Assessment & Plan  Intubated 5/26 and extubated 6/1  Mobilize as able  Respiratory protocol  Supplemental oxygen and wean as able      PAF (paroxysmal atrial fibrillation) ([facility])- (present on admission)  Assessment & Plan  Hx of pAFib , not on OAC at home.   Outpatient metoprolol 25mg daily on hold due to septic shock and borderline hypotension.  Resumed metoprolol 12.5 mg twice daily on 6/10 with holding parameters  Outpatient cardiology/PCP follow up regards OAC use.     Type 2 diabetes mellitus with hyperlipidemia ([facility])- (present on admission)  Assessment & Plan  On lantus and SSI. Holding home po diabetes meds  Hypoglycemic protocol   A1c 10.7    Thrombocytopenia ([facility])- (present on admission)  Assessment & Plan  Resolved         VTE prophylaxis: heparin ppx    I have performed a physical exam and reviewed and updated ROS and Plan today (6/12/2022). In review of yesterday's note (6/11/2022), there are no changes except as documented above.

## 2022-06-12 NOTE — CARE PLAN
The patient is Stable - Low risk of patient condition declining or worsening    Shift Goals  Clinical Goals: monitor menation  Patient Goals: rest  Family Goals: na    Progress made toward(s) clinical / shift goals:     Pt slept comfortably through night between cares. Dressings changed     Patient is not progressing towards the following goals:

## 2022-06-12 NOTE — PROGRESS NOTES
Assumed care of pt at 1900. Pt A/O x2 and cant make needs known. Call light within reach, bed locked in lowest position and hourly rounding initiated. Labs/ orders reviewed and communication board updated.     Bed alarm on

## 2022-06-12 NOTE — PROGRESS NOTES
Received report from NOC RN and assumed care of patient. Pt A&Ox2, disoriented to time and situation. Pt denies pain at this time. Call light within reach. Bed locked and in low position.

## 2022-06-12 NOTE — DIETARY
Nutrition consult received for poor PO intake.  Patient has been followed by nutrition since 5/26.  Milkshakes in place.  See progress notes.  Nutrition Representative will continue to see him for ongoing meal and snack preferences.  RD following.

## 2022-06-12 NOTE — CARE PLAN
The patient is Stable - Low risk of patient condition declining or worsening    Shift Goals  Clinical Goals: titrate O2  Patient Goals: rest  Family Goals: n/    Progress made toward(s) clinical / shift goals:    Problem: Knowledge Deficit - Standard  Goal: Patient and family/care givers will demonstrate understanding of plan of care, disease process/condition, diagnostic tests and medications  Outcome: Progressing     Problem: Pain - Standard  Goal: Alleviation of pain or a reduction in pain to the patient’s comfort goal  Outcome: Progressing     Problem: Skin Integrity  Goal: Skin integrity is maintained or improved  Outcome: Progressing     Problem: Fall Risk  Goal: Patient will remain free from falls  Outcome: Progressing       Patient is not progressing towards the following goals:

## 2022-06-12 NOTE — PROGRESS NOTES
[facility] -  PROGRESS NOTE               Author: Roderick Ramirez M.D. Date & Time: 6/12/2022  9:24 AM     HPI:  Patient is a 66 year old male with a PMHx of renal transplant 9/10/2010 for polycystic kidney disease, DM, thrombocytopenia, CKD3b, pafib, covid19 infection, who presents for AMS.  He was brought in activated trauma for fall, CT imaging has been negative, but was in severe septic shock started on pressors and give nfluid boluses.  On broad spectrum abx.  Currently complains of right leg pain but thinks its better.  Confirms his last dose of tacrolimus and cellcept was yesterday.  Currently on levophed    DAILY NEPHROLOGY SUMMARY:  5/24: Consult done  5/25: NAEO, no complaints, feels a bit better, family at bedside  5/26: Decompensated overnight, intubated due to resp. Distress and pressors initiated  5/27: NAEO, no complaints, hemodynamics decompensated and CRRT initiated instead, tolerated well overnight, still on it this AM  5/28: transitioned to daily iHD for now, +13.8L for hospitalization, remains intubated, on pressor support, Tmax 101.1F, WBC 28.9  5/29: tolerated HD, UF 3L 5/28, remains intubated and on pressor support  5/30: Tolerated UF, improved hemodynamics, remains on pressors, remains on mechanical ventilation   5/31: Seen on Dialysis, vasc cath sluggish despite TPA, only able to run   6/1: Tolerated HD, extubated, UOP increasing-2.7L yesterday, net neg 5L  6/2: Intermittent encephalopathy per wife,  2.5L UOP yesterday, soft Bps, feeling stronger today  6/3: low Bps, brisk UOP-2.6L, with minimal PO intake, thirsty, NO UF with HD, labs pending  6/4: Denies pain or SOB.  UOP 1.5L  No new labs  6/5: Denies pain or SOB.  Feels thirsty.  Creatinine down to 2.4.  6/6: Denies pain or SOB.  Feels thirsty.  No new labs today.  UOP at 3.1L  6/7: Denies pain or SOB.  Drinking thickened water.  Creatinine down to 2.  6/8: Creatinine continues to trend down.  Still has  "dialysis catheter in place  6/9: No new labs this morning.  On thickened liquids still.  Denies pain or SOB.  Temp HD cath removed.  6/10: Creatinine 1.8, still on thickened liquid.  Potassium 5.6.  Denies pain or SOB  6/11: Denies pain or SOB.  Creatinine 1.9.  Potassium 5.3  6/12: Denies pain or SOB.  Cr down to 1.8.  Potassium 5.0    REVIEW OF SYSTEMS:    Limited due to pt communication     PMH/PSH/SH/FH:   Reviewed and unchanged since admission note    CURRENT MEDICATIONS:   Reviewed from admission to present day    VS:  VS:  /49   Pulse 65   Temp 36.7 °C (98 °F) (Temporal)   Resp 18   Ht 1.956 m (6' 5\")   Wt 123 kg (271 lb 6.2 oz)   SpO2 95%   BMI 32.18 kg/m²   GENERAL: Ill appearing  CV: no pedal edema  RESP:non-labored  GI: Soft  MSK: No joint deformities   SKIN: ecchymoses  NEURO: No tremor  PSYCH: Deferred    Fluids:  In: 480 [P.O.:480]  Out: 1300     LABS:  Recent Labs     06/10/22  0314 06/11/22  0106 06/12/22  0405   SODIUM 144 143 140   POTASSIUM 5.6* 5.3 5.0   CHLORIDE 111 111 108   CO2 22 21 22   GLUCOSE 196* 132* 186*   BUN 54* 56* 57*   CREATININE 1.81* 1.91* 1.80*   CALCIUM 8.3* 8.1* 8.1*     IMAGING:   All imaging reviewed from admission to present day    IMPRESSION:  # SANKET  - Etiology likely 2/2 ATN  - has received contrast  - RRT dependent since 5/27, now with signs of recovery  # DDKT  - 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 Coli bacteremia/septic shock  - Source unclear, ?colitis on imaging  - Abx on board  - pressor support  # Hyperkalemia, improved  - No ECG changes  # Encephalopathy  - Etiology likely 2/2 hypoperfusion/sepsis  # Acute respiratory failure  - Intubated 5/26  # RLE ulcer  - Trauma  # type 2 DM  # thrombocytopenia  - worsening    PLAN:  -C/w veltassa PO daily  -Ensure low K diet  -Continue with IVF at 100cc/hr until having better PO fluid intake  -Repeat BMP " tomorrow  -Holding lasix  -tacrolimus 1mg BID, MMF, Tac levels tomorrow  -Daily labs and weights  -renal formula tube feeds  -Monitor I/O  -Dose all meds per eGFR     Thank you

## 2022-06-13 LAB
ANION GAP SERPL CALC-SCNC: 10 MMOL/L (ref 7–16)
BUN SERPL-MCNC: 46 MG/DL (ref 8–22)
CALCIUM SERPL-MCNC: 8.2 MG/DL (ref 8.5–10.5)
CHLORIDE SERPL-SCNC: 103 MMOL/L (ref 96–112)
CO2 SERPL-SCNC: 26 MMOL/L (ref 20–33)
CREAT SERPL-MCNC: 1.65 MG/DL (ref 0.5–1.4)
GFR SERPLBLD CREATININE-BSD FMLA CKD-EPI: 46 ML/MIN/1.73 M 2
GLUCOSE BLD STRIP.AUTO-MCNC: 135 MG/DL (ref 65–99)
GLUCOSE BLD STRIP.AUTO-MCNC: 189 MG/DL (ref 65–99)
GLUCOSE SERPL-MCNC: 179 MG/DL (ref 65–99)
POTASSIUM SERPL-SCNC: 4.8 MMOL/L (ref 3.6–5.5)
SODIUM SERPL-SCNC: 139 MMOL/L (ref 135–145)

## 2022-06-13 PROCEDURE — 80048 BASIC METABOLIC PNL TOTAL CA: CPT

## 2022-06-13 PROCEDURE — 700105 HCHG RX REV CODE 258: Performed by: STUDENT IN AN ORGANIZED HEALTH CARE EDUCATION/TRAINING PROGRAM

## 2022-06-13 PROCEDURE — 700102 HCHG RX REV CODE 250 W/ 637 OVERRIDE(OP): Performed by: HOSPITALIST

## 2022-06-13 PROCEDURE — 36415 COLL VENOUS BLD VENIPUNCTURE: CPT

## 2022-06-13 PROCEDURE — 97110 THERAPEUTIC EXERCISES: CPT

## 2022-06-13 PROCEDURE — A9270 NON-COVERED ITEM OR SERVICE: HCPCS | Performed by: HOSPITALIST

## 2022-06-13 PROCEDURE — 700111 HCHG RX REV CODE 636 W/ 250 OVERRIDE (IP): Performed by: HOSPITALIST

## 2022-06-13 PROCEDURE — 97535 SELF CARE MNGMENT TRAINING: CPT

## 2022-06-13 PROCEDURE — 700111 HCHG RX REV CODE 636 W/ 250 OVERRIDE (IP): Performed by: STUDENT IN AN ORGANIZED HEALTH CARE EDUCATION/TRAINING PROGRAM

## 2022-06-13 PROCEDURE — A9270 NON-COVERED ITEM OR SERVICE: HCPCS | Performed by: STUDENT IN AN ORGANIZED HEALTH CARE EDUCATION/TRAINING PROGRAM

## 2022-06-13 PROCEDURE — 99232 SBSQ HOSP IP/OBS MODERATE 35: CPT | Performed by: STUDENT IN AN ORGANIZED HEALTH CARE EDUCATION/TRAINING PROGRAM

## 2022-06-13 PROCEDURE — 770001 HCHG ROOM/CARE - MED/SURG/GYN PRIV*

## 2022-06-13 PROCEDURE — 700102 HCHG RX REV CODE 250 W/ 637 OVERRIDE(OP): Performed by: STUDENT IN AN ORGANIZED HEALTH CARE EDUCATION/TRAINING PROGRAM

## 2022-06-13 PROCEDURE — 97530 THERAPEUTIC ACTIVITIES: CPT

## 2022-06-13 PROCEDURE — 82962 GLUCOSE BLOOD TEST: CPT | Mod: 91

## 2022-06-13 PROCEDURE — 80197 ASSAY OF TACROLIMUS: CPT

## 2022-06-13 RX ADMIN — METOPROLOL TARTRATE 12.5 MG: 25 TABLET, FILM COATED ORAL at 04:28

## 2022-06-13 RX ADMIN — METOPROLOL TARTRATE 12.5 MG: 25 TABLET, FILM COATED ORAL at 18:29

## 2022-06-13 RX ADMIN — MYCOPHENOLATE MOFETIL 500 MG: 250 CAPSULE ORAL at 18:29

## 2022-06-13 RX ADMIN — SODIUM BICARBONATE: 84 INJECTION, SOLUTION INTRAVENOUS at 04:34

## 2022-06-13 RX ADMIN — HEPARIN SODIUM 5000 UNITS: 5000 INJECTION, SOLUTION INTRAVENOUS; SUBCUTANEOUS at 18:25

## 2022-06-13 RX ADMIN — NYSTATIN 500000 UNITS: 100000 SUSPENSION ORAL at 08:49

## 2022-06-13 RX ADMIN — DAKIN'S SOLUTION 0.125% (QUARTER STRENGTH) 1 ML: 0.12 SOLUTION at 15:53

## 2022-06-13 RX ADMIN — SODIUM BICARBONATE: 84 INJECTION, SOLUTION INTRAVENOUS at 14:48

## 2022-06-13 RX ADMIN — DOCUSATE SODIUM 50 MG AND SENNOSIDES 8.6 MG 2 TABLET: 8.6; 5 TABLET, FILM COATED ORAL at 04:30

## 2022-06-13 RX ADMIN — DAKIN'S SOLUTION 0.125% (QUARTER STRENGTH) 473 ML: 0.12 SOLUTION at 04:28

## 2022-06-13 RX ADMIN — Medication 2 SPRAY: at 15:48

## 2022-06-13 RX ADMIN — TACROLIMUS 1 MG: 1 CAPSULE ORAL at 04:29

## 2022-06-13 RX ADMIN — MYCOPHENOLATE MOFETIL 500 MG: 250 CAPSULE ORAL at 04:30

## 2022-06-13 RX ADMIN — NYSTATIN 500000 UNITS: 100000 SUSPENSION ORAL at 13:59

## 2022-06-13 RX ADMIN — PATIROMER 8.4 G: 8.4 POWDER, FOR SUSPENSION ORAL at 08:52

## 2022-06-13 RX ADMIN — TACROLIMUS 1 MG: 1 CAPSULE ORAL at 18:28

## 2022-06-13 RX ADMIN — PREDNISONE 5 MG: 5 TABLET ORAL at 04:30

## 2022-06-13 RX ADMIN — ACETAMINOPHEN 650 MG: 325 TABLET ORAL at 18:28

## 2022-06-13 RX ADMIN — HEPARIN SODIUM 5000 UNITS: 5000 INJECTION, SOLUTION INTRAVENOUS; SUBCUTANEOUS at 04:36

## 2022-06-13 ASSESSMENT — COGNITIVE AND FUNCTIONAL STATUS - GENERAL
CLIMB 3 TO 5 STEPS WITH RAILING: TOTAL
DRESSING REGULAR UPPER BODY CLOTHING: A LOT
PERSONAL GROOMING: A LOT
MOVING TO AND FROM BED TO CHAIR: UNABLE
MOVING FROM LYING ON BACK TO SITTING ON SIDE OF FLAT BED: UNABLE
SUGGESTED CMS G CODE MODIFIER DAILY ACTIVITY: CL
STANDING UP FROM CHAIR USING ARMS: TOTAL
TURNING FROM BACK TO SIDE WHILE IN FLAT BAD: UNABLE
HELP NEEDED FOR BATHING: A LOT
WALKING IN HOSPITAL ROOM: TOTAL
TOILETING: A LOT
DAILY ACTIVITIY SCORE: 12
DRESSING REGULAR LOWER BODY CLOTHING: A LOT
SUGGESTED CMS G CODE MODIFIER MOBILITY: CN
MOBILITY SCORE: 6
EATING MEALS: A LOT

## 2022-06-13 ASSESSMENT — GAIT ASSESSMENTS: GAIT LEVEL OF ASSIST: UNABLE TO PARTICIPATE

## 2022-06-13 ASSESSMENT — PAIN DESCRIPTION - PAIN TYPE
TYPE: ACUTE PAIN

## 2022-06-13 NOTE — THERAPY
Physical Therapy   Daily Treatment     Patient Name: Jama Altman  Age:  66 y.o., Sex:  male  Medical Record #: [id number]  Today's Date: 6/13/2022     Precautions  Precautions: Fall Risk;Swallow Precautions ( See Comments)  Comments: wounds RLE and escoriated perineal area    Assessment  Pt continues to demonstrate severe deconditioning, impaired activity tolerance, and poor motor planning and positional awareness with tasks. Pt required maxA for rolling and supine<>sit this date. Fair participation with therex and sitting balance; however, unable to progress standing trials despite encouragement. Recommend continued interventions to improve strength/activity tolerance, and recommend SNF upon D/C.     Plan    Continue current treatment plan.    DC Equipment Recommendations:  (defer to facility at next level of care)  Discharge Recommendations: Recommend post-acute placement for additional physical therapy services prior to discharge home      Subjective  Pt reported being tired today, and declined standing trial.     Objective       06/13/22 7055   Precautions   Precautions Fall Risk;Swallow Precautions ( See Comments)   Comments wounds RLE and escoriated perineal area   Vitals   Pulse Oximetry (!) 82 %   O2 (LPM) 1.5   O2 Delivery Device Nasal Cannula   Vitals Comments During activity. O2 sats 90-93% with rest breaks   Pain   Pain Scales 0 to 10 Scale    Pain 0 - 10 Group   Pain Rating Scale (NPRS) 0   Supine Lower Body Exercise   Supine Lower Body Exercises Yes   Heel Slide 2 sets of 10   Comments active assisted BLE, impaired R>L ROM, and notable aberrant movements due to weakess/poor motor control   Sitting Lower Body Exercises   Sitting Lower Body Exercises Yes   Marching Reciprocal  (1x5 (modified ranges R>L), quick to fatigue)   Other Exercises 2x 10 active assisted R knee flexion while EOB to improve ROM. Able to achieve ~90 deg   Neurological Concerns   Comments Frequent small amplitude myoclonic jerks of  extremities/trunk with active movement. Pt alert and communicating throughout   Balance   Sitting Balance (Static) Fair -   Skilled Intervention Verbal Cuing;Postural Facilitation   Comments Cues for optimal positioning of BUE for support while sitting EOB and gradually progressed to unsupported/hands in lap. Cues for upright posture and breathing pattern due to initial desat with activity   Gait Analysis   Gait Level Of Assist Unable to Participate   Bed Mobility    Supine to Sit Maximal Assist  (clear legs and assist trunk to an upright position)   Sit to Supine Maximal Assist  (poor trunk control and positional awareness while descending. MaxA for safety and to clear BLE onto the bed)   Scooting Total Assist   Rolling Maximal Assist to Rt.;Maximum Assist to Lt.  (mod-severe rigidity noted, poor sequencing w/arms for turning while using rails)   Skilled Intervention Tactile Cuing;Verbal Cuing;Sequencing;Postural Facilitation   Comments poor motor planning and positional awareness. 2 person for positioning in sidelying, and DEP hygiene after DM. Barrier cream applied due to escoriated skin   Functional Mobility   Sit to Stand Refused  (Encouraged trial of standing, but pt reported not feeling ready, but wanting to be able to  the next few days)   Bed, Chair, Wheelchair Transfer Unable to Participate   Skilled Intervention Verbal Cuing   How much difficulty does the patient currently have...   Turning over in bed (including adjusting bedclothes, sheets and blankets)? 1   Sitting down on and standing up from a chair with arms (e.g., wheelchair, bedside commode, etc.) 1   Moving from lying on back to sitting on the side of the bed? 1   How much help from another person does the patient currently need...   Moving to and from a bed to a chair (including a wheelchair)? 1   Need to walk in a hospital room? 1   Climbing 3-5 steps with a railing? 1   6 clicks Mobility Score 6   Activity Tolerance   Sitting Edge of  Bed 6-8 minutes   Short Term Goals    Short Term Goal # 1 Pt will perform supine <> sit without bed features within 6 visits in order to progress toward PLOF   Goal Outcome # 1 goal not met   Short Term Goal # 2 Pt will perform STS with FWW and min A within 6 visits in order to progress OOB mobility   Goal Outcome # 2 Goal not met   Short Term Goal # 3 Pt will perform functional transfers with FWW and min A within 6 visits in order to progress OOB mobility   Goal Outcome # 3 Goal not met   Short Term Goal # 4 Pt will ambulate 100 ft with FWW and mod A within 6 visits in order to progress functional mobility   Goal Outcome # 4 Goal not met   Education Group   Education Provided Role of Physical Therapist;Transfer Status;Exercises - Supine;Exercises - Seated   Anticipated Discharge Equipment and Recommendations   DC Equipment Recommendations   (defer to facility at next level of care)   Discharge Recommendations Recommend post-acute placement for additional physical therapy services prior to discharge home   Interdisciplinary Plan of Care Collaboration   IDT Collaboration with  Nursing   Collaboration Comments notified of performance/participation   Session Information   Date / Session Number  6/13- 4(1/3, 6/19)

## 2022-06-13 NOTE — PROGRESS NOTES
Assumed care of pt at 1900. Pt A/O x2 and can make needs known. Call light within reach, bed locked in lowest position and hourly rounding initiated. Labs/ orders reviewed and communication board updated.     Bed alarm on

## 2022-06-13 NOTE — DISCHARGE PLANNING
"TCN following. HTH/SCP chart review completed.PT OT SLP recommended post-acute placement. Would note TCC Frankie noted on 6/11/22 that clinical documentation did not support mbr is able to tolerate/particpate IRF level of treatments.  Mbr awaiting SNF placement.  Patient seen at bedside along with patient's Son  Afshin. Afshin is asking for update on SNF acceptance. I asked a mbr of  DC planning team who verified, SNF has not yet accepted this mbr- I then notified mbr and Son Afshin.  It appears IRF will not accept this mbr citing member \" does not exhibit ability to participate/tolerate IRF level of [place]\".    Appreciate MATEUSZ Patricia's previous notes .Please refer to MATEUSZ Patricia notes 6/10/202 12:20 PM indicating that Mbr could have eventually have 24/7 support from family ( patient lives alone at baseline). In addtiion, family is agreeable to expand choice if mbr is denied from SNF.    Previously completed:  - Orders received for: PT and OT -> rec post acute placement  - Choice forms:  SNF, IRF   - [facility] introduced (Y), referral (not sent anticipating placement).           "

## 2022-06-13 NOTE — THERAPY
"Occupational Therapy  Daily Treatment     Patient Name: Jama Altman  Age:  66 y.o., Sex:  male  Medical Record #: [id number]  Today's Date: 6/13/2022     Precautions  Precautions: Fall Risk, Swallow Precautions ( See Comments)    Assessment    Patient seen for OT treat necessitating Max A bed level bed pan placement/toileting.     Plan    Continue current treatment plan.    DC Equipment Recommendations: Unable to determine at this time  Discharge Recommendations: Recommend post-acute placement for additional occupational therapy services prior to discharge home       Objective       06/13/22 0858   Cognition    Comments self reported need to poop upon therapist arrival. when went to place a bed pan, one was already there. Patient forgot was already on a bed pan until prompted. pleasant and motivated.   Activities of Daily Living   Toileting Maximal Assist  (bed level)   Patient / Family Goals   Patient / Family Goal #1 \"To walk\"   Goal #1 Outcome Goal not met   Short Term Goals   Short Term Goal # 1 Pt will complete ADL transfers with mod A   Goal Outcome # 1 Goal not met   Short Term Goal # 2 Pt will complete gown change with supv   Goal Outcome # 2 Goal not met   Short Term Goal # 3 Pt will complete seated grooming with set-up   Goal Outcome # 3 Goal not met     "

## 2022-06-13 NOTE — PROGRESS NOTES
Hospital Medicine Daily Progress Note    Date of Service  6/13/2022    Chief Complaint  Jama Altman is a 66 y.o. male admitted 5/24/2022 with AMS and fall    Hospital Course  66 y.o. male w/ HTN, DLD, diabetes,  polycystic kidney dz with a renal transplant 9/10/10 and on immunosuppression but has CKD, ADELFO who presented 5/24/2022 with altered mentation.  There was initial concern that he had fallen at home and struck his head.  In the ER he was in atrial fibrillation with a rapid rate of 120-160.  He was given a fluid bolus and initiated on norepinephrine.  He was admitted for severe septic shock likely related to a soft tissue infection of the right leg from where he bumped into something several days ago. During admit he was found to have E.coli bacteremia and right leg cellulitis.  Spinal fluid ws negative for meningitis.  On 5/26 the patient had respiratory distress and was intubated. On 5/28 the patient had PEA arrest and had CPR.  A right knee aspiration was performed and fluid analysis was not showing infection.  On 6/1 the patient was extubated.  The patient had CRRT initiated on 5/27 and daily HD on 5/28 per nephrology notes. Patient shows sign of recovery and has been off dialysis for a few days.  E. coli bacteremia, source likely RLE cellulitis. ID consulted.  Completed iv meropenem with stop date 6/6/2022.     Interval Problem Update  Seen patient at bedside. Still very weak. Planning to let patient sit on the edge of bed.   Oral intake slightly improving.  On supplements.  Encouraged oral intake.  Nutrition consulted.  Continue IV fluids per nephrology  PT/OT recommend postacute  Rehab evaluated the patient yesterday and patient will need to be able to tolerate 3 hours of therapy 5 days a week, and have discharge support  Pending SNF    Consultants/Specialty  Infectious disease  Critical care medicine  Nephrology    Code Status  Full Code    Disposition  [facility]    Review of Systems  All systems  reviewed and negative except as noted per above.    Physical Exam  Temp:  [36.4 °C (97.6 °F)-37 °C (98.6 °F)] 36.7 °C (98.1 °F)  Pulse:  [68-90] 70  Resp:  [18-20] 18  BP: (100-125)/(50-77) 122/63  SpO2:  [93 %-100 %] 98 %    General appearance: NAD, conversant, family at bedside  Eyes: anicteric sclerae, moist conjunctivae; no lid-lag; PERRLA, noted subconjunctival hemorrhage, pronounce on R eye   HENT: Atraumatic; oropharynx clear with moist mucous membranes and no mucosal ulcerations; normal hard and soft palate  Neck: Trachea midline; FROM, supple, no thyromegaly or lymphadenopathy  Lungs: CTA, with normal respiratory effort and no intercostal retractions  CV: RRR, no MRGs  Abdomen: Soft, non-tender; no masses or HSM  Extremities: RLE erythema with dressing noted  Skin: Normal temperature, turgor and texture; no rash, ulcers or subcutaneous nodules  Psych: Answering questions      Current Facility-Administered Medications:   •  sodium bicarbonate 8.4 % 50 mEq in dextrose 5% 1,000 mL infusion, , Intravenous, Continuous, Roderick Ramirez M.D., Last Rate: 100 mL/hr at 06/13/22 0434, New Bag at 06/13/22 0434  •  insulin GLARGINE (Lantus,Semglee) injection, 15 Units, Subcutaneous, BID, Timo Muñoz M.D., 15 Units at 06/13/22 0849  •  metoprolol tartrate (LOPRESSOR) tablet 12.5 mg, 12.5 mg, Oral, TWICE DAILY, Timo Muñoz M.D., 12.5 mg at 06/13/22 0428  •  patiromer (VELTASSA) powder for oral suspension 8.4 g, 8.4 g, Oral, DAILY, Roderick Ramirez M.D., 8.4 g at 06/13/22 0852  •  nystatin (MYCOSTATIN) 912440 UNIT/ML suspension 500,000 Units, 5 mL, Swish & Spit, 4X/DAY, Timo Muñoz M.D., 500,000 Units at 06/13/22 0849  •  insulin regular (HumuLIN R,NovoLIN R) injection, 3-14 Units, Subcutaneous, 4X/DAY ACHS, 4 Units at 06/12/22 2108 **AND** POC blood glucose manual result, , , Q AC AND BEDTIME(S) **AND** NOTIFY MD and PharmD, , , Once **AND** Administer 20 grams of glucose (approximately 8 ounces of fruit juice) every 15  minutes PRN FSBG less than 70 mg/dL, , , PRN **AND** dextrose 50% (D50W) injection 25 g, 25 g, Intravenous, Q15 MIN PRN, Linsey M Wegener, A.P.R.N.  •  acetaminophen (Tylenol) tablet 650 mg, 650 mg, Oral, Q6HRS PRN, Mehrdad Laughlin M.D., 650 mg at 06/05/22 1000  •  tacrolimus (PROGRAF) capsule 1 mg, 1 mg, Oral, BID, Mehrdad Laughlin M.D., 1 mg at 06/13/22 0429  •  senna-docusate (PERICOLACE or SENOKOT S) 8.6-50 MG per tablet 2 Tablet, 2 Tablet, Oral, BID, 2 Tablet at 06/13/22 0430 **AND** polyethylene glycol/lytes (MIRALAX) PACKET 1 Packet, 1 Packet, Oral, QDAY PRN **AND** [DISCONTINUED] magnesium hydroxide (MILK OF MAGNESIA) suspension 30 mL, 30 mL, Oral, QDAY PRN **AND** bisacodyl (DULCOLAX) suppository 10 mg, 10 mg, Rectal, QDAY PRN, Mehrdad Laughlin M.D.  •  mycophenolate (CELLCEPT) capsule 500 mg, 500 mg, Oral, BID, Mehrdad Laughlin M.D., 500 mg at 06/13/22 0430  •  oxyCODONE immediate-release (ROXICODONE) tablet 5 mg, 5 mg, Oral, Q4HRS PRN, Mehrdad Laughlin M.D.  •  predniSONE (DELTASONE) tablet 5 mg, 5 mg, Oral, DAILY, Mehrdad Laughlin M.D., 5 mg at 06/13/22 0430  •  heparin injection 5,000 Units, 5,000 Units, Subcutaneous, Q12HRS, James Titus M.D., 5,000 Units at 06/13/22 0436  •  heparin injection 2,800 Units, 2,800 Units, Intracatheter, DIALYSIS PRN, James Sheppard M.D., 2,800 Units at 06/02/22 1400  •  Respiratory Therapy Consult, , Nebulization, Continuous RT, Reuben Swartz M.D.  •  sodium chloride (OCEAN) 0.65 % nasal spray 2 Spray, 2 Spray, Nasal, Q2HRS PRN, Kayy Hopkins M.D., 2 [place] at 06/12/22 1828  •  dakins 0.125% (1/4 strength) topical soln, , Topical, BID, Timo Muñoz M.D., 473 mL at 06/13/22 0428  •  HYDROmorphone (Dilaudid) injection 0.5 mg, 0.5 mg, Intravenous, Q2HRS PRN, Tracey Whitfield M.D., 0.5 mg at 05/31/22 0059      Fluids    Intake/Output Summary (Last 24 hours) at 6/13/2022 0957  Last data filed at 6/13/2022 0500  Gross per 24 hour   Intake 120 ml   Output 1400 ml   Net -1280  ml       Laboratory  Recent Labs     06/12/22  0405   WBC 7.6   RBC 3.61*   HEMOGLOBIN 10.2*   HEMATOCRIT 33.2*   MCV 92.0   MCH 28.3   MCHC 30.7*   RDW 50.7*   PLATELETCT 178   MPV 11.4     Recent Labs     06/11/22  0106 06/12/22  0405   SODIUM 143 140   POTASSIUM 5.3 5.0   CHLORIDE 111 108   CO2 21 22   GLUCOSE 132* 186*   BUN 56* 57*   CREATININE 1.91* 1.80*   CALCIUM 8.1* 8.1*                   Imaging  CT-HEAD W/O   Final Result      1.  Cerebral atrophy.      2.  Bilateral mastoid effusions.      3.  Otherwise, Head CT without contrast within normal limits. No evidence of acute intracranial hemorrhage or mass lesion.         DX-ABDOMEN FOR TUBE PLACEMENT   Final Result      Enteric feeding tube terminates with the tip projecting over the expected location of the 2nd-3rd portion of the duodenum.      DX-CHEST-LIMITED (1 VIEW)   Final Result      1.  Bibasilar underinflation atelectasis which could obscure an additional process. This is unchanged.   2.  Interstitial opacities likely pulmonary edema   3.  Persistently enlarged cardiac silhouette      DX-CHEST-LIMITED (1 VIEW)   Final Result      1.  Hypoinflation with mildly increased left basilar atelectasis.   2.  Removal of endotracheal tube.      DX-CHEST-LIMITED (1 VIEW)   Final Result         No significant interval change.      DX-CHEST-LIMITED (1 VIEW)   Final Result      Stable areas of patchy atelectasis/consolidation.      DX-CHEST-PORTABLE (1 VIEW)   Final Result      Stable chest x-ray findings.      DX-CHEST-PORTABLE (1 VIEW)   Final Result         1.  Pulmonary edema and/or infiltrates, somewhat increased particularly in the right lung base compared to prior study.   2.  Cardiomegaly      CT-ABDOMEN-PELVIS W/O   Final Result         Limited noncontrast exam      1. Long segment wall thickening from the descending colon to the sigmoid colon could relate to underdistention or colitis. Air-fluid level in the more proximal colon is likely diarrheal  disease.      2. Right lower quadrant transplant kidney with retained prior contrast, likely secondary to renal failure/ATN/contrast nephropathy. No hydronephrosis.      Absent right kidney. Polycystic left kidney.      CT-EXTREMITY, LOWER W/O RIGHT   Final Result      1. Postsurgical changes of right total knee arthroplasty.   2. Right knee joint effusion with thickening of the joint capsule and surrounding soft tissue edema.   3. Circumferential edema involving the soft tissues of the right lower leg with an anterior soft tissue defect and with areas of clustering on the medial right lower leg skin surface.   4. Arteriosclerosis.      DX-CHEST-PORTABLE (1 VIEW)   Final Result      1.  Unchanged position of supporting devices are visualized extent   2.  No visible pneumothorax following chest compressions   3.  Unchanged atelectasis and possible superimposed interstitial pulmonary edema or atypical pneumonia      US-EXTREMITY ARTERY LOWER UNILAT RIGHT   Final Result      US-MIRELLA SINGLE LEVEL BILAT   Final Result      US-EXTREMITY ARTERY LOWER BILAT W/MIRELLA (COMBO)   Final Result      DX-CHEST-PORTABLE (1 VIEW)   Final Result         1.  Pulmonary edema and/or infiltrates, somewhat increased particularly in the right lung base compared to prior study.   2.  Cardiomegaly      DX-ABDOMEN FOR TUBE PLACEMENT   Final Result      1. The tip of the feeding tube terminates over the junction of the gastric pylorus and duodenal bulb.   2. The remainder is stable.      DX-CHEST-PORTABLE (1 VIEW)   Final Result      1. Interval decrease in size of the right pleural effusion.   2. No left pleural effusion.   3. No visible pneumothorax status post bronchoscopy.   4. Interval placement of a Dobbhoff feeding tube.   5. Improving parenchymal loss in the right lung base, incompletely resolved.   6. The remainder is stable.      DX-CHEST-PORTABLE (1 VIEW)   Final Result      1. Interval development of a moderate right pleural effusion  after placement of a left internal jugular dialysis catheter, abutting the right lateral wall of the right atrium. Verification of line position with contrast injection under fluoroscopy is    offered.   2. Improved perihilar atelectasis.   3. The remainder is stable.      I, Dr. Matthew Brown, discussed the results of this examination directly by phone with Dr. Reuben Swartz on 5/26/2022 at 0855 hours.      EC-ECHOCARDIOGRAM COMPLETE W/ CONT   Final Result      DX-CHEST-LIMITED (1 VIEW)   Final Result         1. Right internal jugular central venous access catheter placed in the interval terminates over the upper aspect of the right atrium. No postprocedure visible pneumothorax.   2. Stable patchy parenchymal opacities in the lungs, with a stable small left pleural effusion.      DX-CHEST-PORTABLE (1 VIEW)   Final Result         1.  Pulmonary edema and/or infiltrates are identified, which are stable since the prior exam.   2.  Trace bilateral pleural effusions   3.  Cardiomegaly      CT-EXTREMITY, LOWER W/O RIGHT   Final Result      1.  Status post right total knee replacement.      2.  Diffuse atherosclerotic calcification of the arterial system of the right lower extremity suspicious for diabetes mellitus.      3.  Soft tissue attenuation in the subcutaneous tissues of the mid to distal lower leg and foot suspicious for cellulitis.      4.  No evidence of soft tissue ulceration in the right lower leg or foot.      5.  No evidence of acute osteomyelitis in the right lower leg or foot.      6.  Small subcutaneous abscesses cannot be excluded without the use of intravenous contrast.      DX-CHEST-PORTABLE (1 VIEW)   Final Result      Left internal jugular central venous access catheter terminates over a persistent left superior vena cava. No postprocedure visible pneumothorax.      The remainder is stable.      DX-TIBIA AND FIBULA RIGHT   Final Result      1. Right lower leg soft tissue swelling.   2. No acute  fracture or subluxation.   3. Postsurgical changes of right total knee arthroplasty.      US-ABDOMEN F.A.S.T. LTD (FOR ED USE ONLY)   Final Result      No free fluid seen in all 4 quadrants.      Negative FAST scan.            CT-ABDOMEN-PELVIS WITH   Final Result      1. No acute posttraumatic findings in the abdomen or pelvis.   2. Bibasilar subsegmental atelectasis.   3. Right pelvic transplant kidney without hydronephrosis.   4. Polycystic left kidney.   5. Diverticulosis without diverticulitis. Normal appendix.      CT-CTA CHEST PULMONARY ARTERY W/ RECONS   Final Result      1.  No CT evidence of pulmonary emboli.      2.  Bibasilar atelectasis.      3.  No evidence of rib fracture.      4. Diffuse coronary artery calcification.      CT-HEAD W/O   Final Result      1.  Cerebral atrophy.      2.  Otherwise, Head CT without contrast within normal limits. No evidence of acute cerebral infarction, hemorrhage or mass lesion.         CT-CSPINE WITHOUT PLUS RECONS   Final Result      1.  Moderate osteoarthritic changes at the C6-7 level with disc space narrowing and marginal osteophytosis. Further there is moderate cervical spondylotic changes at this level.      2.  No evidence of cervical spine fracture and/or subluxation.      DX-PELVIS-1 OR 2 VIEWS   Final Result      1.  Unremarkable single AP view of the pelvis.      DX-CHEST-LIMITED (1 VIEW)   Final Result      1.  Curvilinear perihilar opacities likely atelectasis and/or parenchymal scarring.      2.  Mild cardiomegaly.           Assessment/Plan  * Septic shock (HCC)- (present on admission)  Assessment & Plan  Sec to E.coli bacteremia from RLE cellulitis  Completed abx   Resolved    Bacteremia due to Escherichia coli- (present on admission)  Assessment & Plan  Blood culture pos E coli on 5/24. Repeated blood culture negative to date. Source likely sec to RLE cellulitis  Completed 10 days course of IV meropenem (started on 5/28 given new fever and worsening  chest x-ray) on 6/6  ID consulted     Acute renal failure superimposed on stage 3a chronic kidney disease (HCC)  Assessment & Plan  Nephrology consulting  Hemodialysis as per nephrology. Has been off HD as renal function improving, dialysis catheter has been removed  On IV fluid  Monitor vitals, I/O's, labs  Avoid nephrotoxins.  Immunosuppression for hx of renal transplant    Hyperkalemia  Assessment & Plan  Low potassium diet  On Velt[place]  Continue to monitor    Knee effusion, right  Assessment & Plan  s/p bedside right knee joint aspiration on 5/28.  Synovial fluid analysis reveals hazy priscila fluid, 2638 WBCs with PMN predominance and no crystals seen.  Fluid not consistent with infection.  Culture neg    Cardiac arrest ([facility])  Assessment & Plan  S/p PEA  Monitor labs, vitals.    Acute metabolic encephalopathy  Assessment & Plan  Improving.  Monitor neurochecks  Treating infection as likely etiology    Immunosuppression due to chronic steroid use ([facility])  Assessment & Plan  Restart immunosuppression as per nephrology  On antibiotics for infection.    Acute respiratory failure with hypoxia ([facility])  Assessment & Plan  Intubated 5/26 and extubated 6/1  Mobilize as able  Respiratory protocol  Supplemental oxygen and wean as able      PAF (paroxysmal atrial fibrillation) ([facility])- (present on admission)  Assessment & Plan  Hx of pAFib , not on OAC at home.   Outpatient metoprolol 25mg daily on hold due to septic shock and borderline hypotension.  Resumed metoprolol 12.5 mg twice daily on 6/10 with holding parameters  Outpatient cardiology/PCP follow up regards OAC use.     Type 2 diabetes mellitus with hyperlipidemia ([facility])- (present on admission)  Assessment & Plan  On lantus and SSI. Holding home po diabetes meds  Hypoglycemic protocol   A1c 10.7    Thrombocytopenia ([facility])- (present on admission)  Assessment & Plan  Resolved        VTE prophylaxis: heparin ppx    I have performed a physical exam and reviewed and updated ROS  and Plan today (6/13/2022). In review of yesterday's note (6/12/2022), there are no changes except as documented above.

## 2022-06-13 NOTE — PROGRESS NOTES
[facility] -  PROGRESS NOTE               Author: Maylin Sheppard M.D. Date & Time: 6/13/2022  11:14 AM     HPI:  Patient is a 66 year old male with a PMHx of renal transplant 9/10/2010 for polycystic kidney disease, DM, thrombocytopenia, CKD3b, pafib, covid19 infection, who presents for AMS.  He was brought in activated trauma for fall, CT imaging has been negative, but was in severe septic shock started on pressors and give nfluid boluses.  On broad spectrum abx.  Currently complains of right leg pain but thinks its better.  Confirms his last dose of tacrolimus and cellcept was yesterday.  Currently on levophed     DAILY NEPHROLOGY SUMMARY:  5/24: Consult done  5/25: NAEO, no complaints, feels a bit better, family at bedside  5/26: Decompensated overnight, intubated due to resp. Distress and pressors initiated  5/27: NAEO, no complaints, hemodynamics decompensated and CRRT initiated instead, tolerated well overnight, still on it this AM  5/28: transitioned to daily iHD for now, +13.8L for hospitalization, remains intubated, on pressor support, Tmax 101.1F, WBC 28.9  5/29: tolerated HD, UF 3L 5/28, remains intubated and on pressor support  5/30: Tolerated UF, improved hemodynamics, remains on pressors, remains on mechanical ventilation   5/31: Seen on Dialysis, vasc cath sluggish despite TPA, only able to run   6/1: Tolerated HD, extubated, UOP increasing-2.7L yesterday, net neg 5L  6/2: Intermittent encephalopathy per wife,  2.5L UOP yesterday, soft Bps, feeling stronger today  6/3: low Bps, brisk UOP-2.6L, with minimal PO intake, thirsty, NO UF with HD, labs pending  6/4: Denies pain or SOB.  UOP 1.5L  No new labs  6/5: Denies pain or SOB.  Feels thirsty.  Creatinine down to 2.4.  6/6: Denies pain or SOB.  Feels thirsty.  No new labs today.  UOP at 3.1L  6/7: Denies pain or SOB.  Drinking thickened water.  Creatinine down to 2.  6/8: Creatinine continues to trend down.  Still has  "dialysis catheter in place  6/9: No new labs this morning.  On thickened liquids still.  Denies pain or SOB.  Temp HD cath removed.  6/10: Creatinine 1.8, still on thickened liquid.  Potassium 5.6.  Denies pain or SOB  6/11: Denies pain or SOB.  Creatinine 1.9.  Potassium 5.3  6/12: Denies pain or SOB.  Cr down to 1.8.  Potassium 5.0  6/13: Oral intake slightly improving, pending SNIF.      REVIEW OF SYSTEMS:    12 systems were reviewed and negative except as above     PMH/PSH/SH/FH:   Reviewed and unchanged since admission note    CURRENT MEDICATIONS:   Reviewed from admission to present day    VS:  /63   Pulse 70   Temp 36.7 °C (98.1 °F) (Temporal)   Resp 18   Ht 1.956 m (6' 5\")   Wt 123 kg (271 lb 6.2 oz)   SpO2 98%   BMI 32.18 kg/m²     Physical Exam  GENERAL: Ill appearing  CV: no pedal edema  RESP:non-labored  GI: Soft  MSK: No joint deformities   SKIN: ecchymoses  NEURO: No tremor  PSYCH: Deferred  Fluids:  In: 120 [P.O.:120]  Out: 1400     LABS:  Recent Labs     06/11/22  0106 06/12/22  0405 06/13/22  0919   SODIUM 143 140 139   POTASSIUM 5.3 5.0 4.8   CHLORIDE 111 108 103   CO2 21 22 26   GLUCOSE 132* 186* 179*   BUN 56* 57* 46*   CREATININE 1.91* 1.80* 1.65*   CALCIUM 8.1* 8.1* 8.2*       IMAGING:   All imaging reviewed from admission to present day    IMPRESSION:  # SANKET  - Etiology likely 2/2 ATN  - has received contrast  - RRT dependent since 5/27, now with signs of recovery  # DDKT  - 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 Coli bacteremia/septic shock  - Source unclear, ?colitis on imaging  - Abx on board  - pressor support  # Hyperkalemia, improved  - No ECG changes  # Encephalopathy  - Etiology likely 2/2 hypoperfusion/sepsis  # Acute respiratory failure  - Intubated 5/26  # RLE ulcer  - Trauma  # type 2 DM  # thrombocytopenia  - worsening     PLAN:  -C/w veltassa PO daily  -Ensure low K " diet  -Continue with IVF at 100cc/hr until having better PO fluid intake  -renal panel daily   -Holding lasix  -tacrolimus 1mg BID, MMF, Tac levels sent 6/13  -Daily labs and weights  -renal formula tube feeds  -Monitor I/O  -Dose all meds per eGFR      Thank you

## 2022-06-13 NOTE — CARE PLAN
The patient is Stable - Low risk of patient condition declining or worsening    Shift Goals  Clinical Goals: monitor mentation  Patient Goals: rest  Family Goals: n/    Progress made toward(s) clinical / shift goals:  Pt slept comfortably between cares throughout night. Dressing changed per order     Patient is not progressing towards the following goals:

## 2022-06-14 ENCOUNTER — HOME HEALTH ADMISSION (OUTPATIENT)
Dept: HOME HEALTH SERVICES | Facility: HOME HEALTHCARE | Age: 67
End: 2022-06-14
Payer: MEDICARE

## 2022-06-14 ENCOUNTER — APPOINTMENT (OUTPATIENT)
Dept: RADIOLOGY | Facility: MEDICAL CENTER | Age: 67
DRG: 870 | End: 2022-06-14
Attending: STUDENT IN AN ORGANIZED HEALTH CARE EDUCATION/TRAINING PROGRAM
Payer: MEDICARE

## 2022-06-14 PROBLEM — L03.115 CELLULITIS OF RIGHT LOWER EXTREMITY: Status: ACTIVE | Noted: 2022-06-14

## 2022-06-14 PROBLEM — R13.10 DYSPHAGIA: Status: ACTIVE | Noted: 2022-06-14

## 2022-06-14 LAB
ANION GAP SERPL CALC-SCNC: 8 MMOL/L (ref 7–16)
BUN SERPL-MCNC: 39 MG/DL (ref 8–22)
CALCIUM SERPL-MCNC: 7.9 MG/DL (ref 8.5–10.5)
CHLORIDE SERPL-SCNC: 101 MMOL/L (ref 96–112)
CO2 SERPL-SCNC: 28 MMOL/L (ref 20–33)
CREAT SERPL-MCNC: 1.47 MG/DL (ref 0.5–1.4)
ERYTHROCYTE [DISTWIDTH] IN BLOOD BY AUTOMATED COUNT: 49.1 FL (ref 35.9–50)
GFR SERPLBLD CREATININE-BSD FMLA CKD-EPI: 52 ML/MIN/1.73 M 2
GLUCOSE BLD STRIP.AUTO-MCNC: 106 MG/DL (ref 65–99)
GLUCOSE BLD STRIP.AUTO-MCNC: 107 MG/DL (ref 65–99)
GLUCOSE BLD STRIP.AUTO-MCNC: 138 MG/DL (ref 65–99)
GLUCOSE BLD STRIP.AUTO-MCNC: 161 MG/DL (ref 65–99)
GLUCOSE BLD STRIP.AUTO-MCNC: 173 MG/DL (ref 65–99)
GLUCOSE BLD STRIP.AUTO-MCNC: 248 MG/DL (ref 65–99)
GLUCOSE SERPL-MCNC: 171 MG/DL (ref 65–99)
HCT VFR BLD AUTO: 34.1 % (ref 42–52)
HGB BLD-MCNC: 10.6 G/DL (ref 14–18)
MCH RBC QN AUTO: 28.3 PG (ref 27–33)
MCHC RBC AUTO-ENTMCNC: 31.1 G/DL (ref 33.7–35.3)
MCV RBC AUTO: 90.9 FL (ref 81.4–97.8)
PLATELET # BLD AUTO: 162 K/UL (ref 164–446)
PMV BLD AUTO: 11.3 FL (ref 9–12.9)
POTASSIUM SERPL-SCNC: 4.6 MMOL/L (ref 3.6–5.5)
RBC # BLD AUTO: 3.75 M/UL (ref 4.7–6.1)
SODIUM SERPL-SCNC: 137 MMOL/L (ref 135–145)
TACROLIMUS BLD-MCNC: 6.5 NG/ML
TACROLIMUS BLD-MCNC: 7.9 NG/ML
WBC # BLD AUTO: 6.7 K/UL (ref 4.8–10.8)

## 2022-06-14 PROCEDURE — A9270 NON-COVERED ITEM OR SERVICE: HCPCS | Performed by: STUDENT IN AN ORGANIZED HEALTH CARE EDUCATION/TRAINING PROGRAM

## 2022-06-14 PROCEDURE — 36415 COLL VENOUS BLD VENIPUNCTURE: CPT

## 2022-06-14 PROCEDURE — 92526 ORAL FUNCTION THERAPY: CPT

## 2022-06-14 PROCEDURE — 700102 HCHG RX REV CODE 250 W/ 637 OVERRIDE(OP): Performed by: STUDENT IN AN ORGANIZED HEALTH CARE EDUCATION/TRAINING PROGRAM

## 2022-06-14 PROCEDURE — 99232 SBSQ HOSP IP/OBS MODERATE 35: CPT | Performed by: STUDENT IN AN ORGANIZED HEALTH CARE EDUCATION/TRAINING PROGRAM

## 2022-06-14 PROCEDURE — 700102 HCHG RX REV CODE 250 W/ 637 OVERRIDE(OP): Performed by: HOSPITALIST

## 2022-06-14 PROCEDURE — 700111 HCHG RX REV CODE 636 W/ 250 OVERRIDE (IP): Performed by: STUDENT IN AN ORGANIZED HEALTH CARE EDUCATION/TRAINING PROGRAM

## 2022-06-14 PROCEDURE — 80048 BASIC METABOLIC PNL TOTAL CA: CPT

## 2022-06-14 PROCEDURE — 85027 COMPLETE CBC AUTOMATED: CPT

## 2022-06-14 PROCEDURE — 700105 HCHG RX REV CODE 258: Performed by: STUDENT IN AN ORGANIZED HEALTH CARE EDUCATION/TRAINING PROGRAM

## 2022-06-14 PROCEDURE — 71045 X-RAY EXAM CHEST 1 VIEW: CPT

## 2022-06-14 PROCEDURE — 700111 HCHG RX REV CODE 636 W/ 250 OVERRIDE (IP): Performed by: HOSPITALIST

## 2022-06-14 PROCEDURE — 770001 HCHG ROOM/CARE - MED/SURG/GYN PRIV*

## 2022-06-14 PROCEDURE — 82962 GLUCOSE BLOOD TEST: CPT

## 2022-06-14 PROCEDURE — A9270 NON-COVERED ITEM OR SERVICE: HCPCS | Performed by: HOSPITALIST

## 2022-06-14 RX ORDER — ACETAMINOPHEN 325 MG/1
650 TABLET ORAL EVERY 6 HOURS PRN
Status: CANCELLED | OUTPATIENT
Start: 2022-06-14

## 2022-06-14 RX ADMIN — PREDNISONE 5 MG: 5 TABLET ORAL at 06:21

## 2022-06-14 RX ADMIN — MYCOPHENOLATE MOFETIL 500 MG: 250 CAPSULE ORAL at 17:42

## 2022-06-14 RX ADMIN — TACROLIMUS 1 MG: 1 CAPSULE ORAL at 17:42

## 2022-06-14 RX ADMIN — Medication 2 SPRAY: at 17:42

## 2022-06-14 RX ADMIN — SODIUM BICARBONATE: 84 INJECTION, SOLUTION INTRAVENOUS at 01:20

## 2022-06-14 RX ADMIN — NYSTATIN 500000 UNITS: 100000 SUSPENSION ORAL at 13:04

## 2022-06-14 RX ADMIN — DAKIN'S SOLUTION 0.125% (QUARTER STRENGTH) 473 ML: 0.12 SOLUTION at 14:12

## 2022-06-14 RX ADMIN — TACROLIMUS 1 MG: 1 CAPSULE ORAL at 06:20

## 2022-06-14 RX ADMIN — MYCOPHENOLATE MOFETIL 500 MG: 250 CAPSULE ORAL at 06:20

## 2022-06-14 RX ADMIN — PATIROMER 8.4 G: 8.4 POWDER, FOR SUSPENSION ORAL at 09:01

## 2022-06-14 RX ADMIN — HEPARIN SODIUM 5000 UNITS: 5000 INJECTION, SOLUTION INTRAVENOUS; SUBCUTANEOUS at 17:42

## 2022-06-14 RX ADMIN — NYSTATIN 500000 UNITS: 100000 SUSPENSION ORAL at 17:42

## 2022-06-14 RX ADMIN — METOPROLOL TARTRATE 12.5 MG: 25 TABLET, FILM COATED ORAL at 17:42

## 2022-06-14 RX ADMIN — METOPROLOL TARTRATE 12.5 MG: 25 TABLET, FILM COATED ORAL at 06:20

## 2022-06-14 RX ADMIN — NYSTATIN 500000 UNITS: 100000 SUSPENSION ORAL at 09:01

## 2022-06-14 RX ADMIN — DAKIN'S SOLUTION 0.125% (QUARTER STRENGTH) 473 ML: 0.12 SOLUTION at 06:21

## 2022-06-14 RX ADMIN — HEPARIN SODIUM 5000 UNITS: 5000 INJECTION, SOLUTION INTRAVENOUS; SUBCUTANEOUS at 06:21

## 2022-06-14 RX ADMIN — ACETAMINOPHEN 650 MG: 325 TABLET ORAL at 10:37

## 2022-06-14 RX ADMIN — Medication 2 SPRAY: at 11:50

## 2022-06-14 ASSESSMENT — PAIN DESCRIPTION - PAIN TYPE: TYPE: ACUTE PAIN

## 2022-06-14 NOTE — PROGRESS NOTES
[facility] -  PROGRESS NOTE               Author: Raudel Duron M.D. Date & Time: 6/14/2022  9:43 AM     HPI:  Patient is a 66 year old male with a PMHx of renal transplant 9/10/2010 for polycystic kidney disease, DM, thrombocytopenia, CKD3b, pafib, covid19 infection, who presents for AMS.  He was brought in activated trauma for fall, CT imaging has been negative, but was in severe septic shock started on pressors and give nfluid boluses.  On broad spectrum abx.  Currently complains of right leg pain but thinks its better.  Confirms his last dose of tacrolimus and cellcept was yesterday.  Currently on levophed     DAILY NEPHROLOGY SUMMARY:  5/24: Consult done  5/25: NAEO, no complaints, feels a bit better, family at bedside  5/26: Decompensated overnight, intubated due to resp. Distress and pressors initiated  5/27: NAEO, no complaints, hemodynamics decompensated and CRRT initiated instead, tolerated well overnight, still on it this AM  5/28: transitioned to daily iHD for now, +13.8L for hospitalization, remains intubated, on pressor support, Tmax 101.1F, WBC 28.9  5/29: tolerated HD, UF 3L 5/28, remains intubated and on pressor support  5/30: Tolerated UF, improved hemodynamics, remains on pressors, remains on mechanical ventilation   5/31: Seen on Dialysis, vasc cath sluggish despite TPA, only able to run   6/1: Tolerated HD, extubated, UOP increasing-2.7L yesterday, net neg 5L  6/2: Intermittent encephalopathy per wife,  2.5L UOP yesterday, soft Bps, feeling stronger today  6/3: low Bps, brisk UOP-2.6L, with minimal PO intake, thirsty, NO UF with HD, labs pending  6/4: Denies pain or SOB.  UOP 1.5L  No new labs  6/5: Denies pain or SOB.  Feels thirsty.  Creatinine down to 2.4.  6/6: Denies pain or SOB.  Feels thirsty.  No new labs today.  UOP at 3.1L  6/7: Denies pain or SOB.  Drinking thickened water.  Creatinine down to 2.  6/8: Creatinine continues to trend down.  Still  "has dialysis catheter in place  6/9: No new labs this morning.  On thickened liquids still.  Denies pain or SOB.  Temp HD cath removed.  6/10: Creatinine 1.8, still on thickened liquid.  Potassium 5.6.  Denies pain or SOB  6/11: Denies pain or SOB.  Creatinine 1.9.  Potassium 5.3  6/12: Denies pain or SOB.  Cr down to 1.8.  Potassium 5.0  6/13: Oral intake slightly improving, pending SNIF.   6/14: Net -1L output. Cr 1.47; at baseline. K 4.6. On 1L NC; mIVF discontinued. CXR showing b/l opacities, likely improving edema.     REVIEW OF SYSTEMS:    12 systems were reviewed and negative except as above     PMH/PSH/SH/FH:   Reviewed and unchanged since admission note    CURRENT MEDICATIONS:   Reviewed from admission to present day    VS:  /67   Pulse 90   Temp 36.5 °C (97.7 °F) (Temporal)   Resp 17   Ht 1.956 m (6' 5\")   Wt 123 kg (271 lb 6.2 oz)   SpO2 94%   BMI 32.18 kg/m²     Physical Exam  GENERAL: Ill appearing  CV: no pedal edema  RESP:non-labored  GI: Soft  MSK: No joint deformities   SKIN: ecchymoses  NEURO: No tremor  PSYCH: Deferred  Fluids:  In: 480 [P.O.:480]  Out: 1450     LABS:  Recent Labs     06/12/22  0405 06/13/22  0919 06/14/22  0433   SODIUM 140 139 137   POTASSIUM 5.0 4.8 4.6   CHLORIDE 108 103 101   CO2 22 26 28   GLUCOSE 186* 179* 171*   BUN 57* 46* 39*   CREATININE 1.80* 1.65* 1.47*   CALCIUM 8.1* 8.2* 7.9*       IMAGING:   All imaging reviewed from admission to present day    IMPRESSION:  # SANKET  - Etiology likely 2/2 ATN  - has received contrast  - RRT dependent since 5/27, now with signs of recovery  # DDKT  - 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 Coli bacteremia/septic shock  - Source unclear, ?colitis on imaging  - Abx on board  - pressor support  # Hyperkalemia, improved  - No ECG changes  # Encephalopathy  - Etiology likely 2/2 hypoperfusion/sepsis  # Acute respiratory failure  - Intubated " 5/26  # RLE ulcer  - Trauma  # type 2 DM  # thrombocytopenia  - worsening     PLAN:  -C/w veltassa PO daily  -Ensure low K diet  -mIVF discontinued d/t increased oxygen demand  -renal panel daily   -Holding lasix  -tacrolimus 1mg BID, MMF, Tac levels sent 6/13  -Daily labs and weights  -renal formula tube feeds  -Monitor I/O  -Dose all meds per eGFR      Thank you

## 2022-06-14 NOTE — PROGRESS NOTES
Hospital Medicine Daily Progress Note    Date of Service  6/14/2022    Chief Complaint  Jama Altman is a 66 y.o. male admitted 5/24/2022 with AMS and fall    Hospital Course  66 y.o. male w/ HTN, DLD, diabetes,  polycystic kidney dz with a renal transplant 9/10/10 and on immunosuppression but has CKD, ADELFO who presented 5/24/2022 with altered mentation.  There was initial concern that he had fallen at home and struck his head.  In the ER he was in atrial fibrillation with a rapid rate of 120-160.  He was given a fluid bolus and initiated on norepinephrine.  He was admitted for severe septic shock likely related to a soft tissue infection of the right leg from where he bumped into something several days ago. During admit he was found to have E.coli bacteremia and right leg cellulitis.  Spinal fluid ws negative for meningitis.  On 5/26 the patient had respiratory distress and was intubated. On 5/28 the patient had PEA arrest and had CPR.  A right knee aspiration was performed and fluid analysis was not showing infection.  On 6/1 the patient was extubated.  The patient had CRRT initiated on 5/27 and daily HD on 5/28 per nephrology notes. Patient shows sign of recovery and has been off dialysis for a few days.  E. coli bacteremia, source likely RLE cellulitis. ID consulted.  Completed iv meropenem with stop date 6/6/2022.     Interval Problem Update  Patient was seen and examined at bedside.  Denies nausea vomiting or pain.     Renal function continued to improve, nephro following  Oxygen needs is up to 2 L, reviewed the chart, echo 5/2022 showed EF of 35%.  Discussed with nephrology, discontinued IV fluid.  Chest x-ray stable.      SLP evaluated, recommended FEES, may advance diet if tolerated    PT/OT recommend postacute  Rehab evaluated the patient yesterday and patient will need to be able to tolerate 3 hours of therapy 5 days a week, and have discharge support  Pending SNF    Consultants/Specialty  Infectious  disease  Critical care medicine  Nephrology    Code Status  Full Code    Disposition  SNF    Review of Systems  All systems reviewed and negative except as noted per above.    Physical Exam  Temp:  [36.3 °C (97.4 °F)-36.5 °C (97.7 °F)] 36.5 °C (97.7 °F)  Pulse:  [] 90  Resp:  [17-18] 17  BP: (100-112)/(65-88) 112/67  SpO2:  [82 %-96 %] 94 %    General appearance: NAD, conversant, family at bedside  Eyes: anicteric sclerae, moist conjunctivae; no lid-lag; PERRLA, noted subconjunctival hemorrhage, pronounce on R eye   HENT: Atraumatic; oropharynx clear with moist mucous membranes and no mucosal ulcerations; normal hard and soft palate  Neck: Trachea midline; FROM, supple, no thyromegaly or lymphadenopathy  Lungs: CTA, with normal respiratory effort and no intercostal retractions  CV: RRR, no MRGs  Abdomen: Soft, non-tender; no masses or HSM  Extremities: RLE erythema with clean dressing  Skin: Normal temperature, turgor and texture; no rash, ulcers or subcutaneous nodules  Psych: Answering questions      Current Facility-Administered Medications:   •  insulin GLARGINE (Lantus,Semglee) injection, 15 Units, Subcutaneous, BID, Timo Muñoz M.D., 15 Units at 06/14/22 0901  •  metoprolol tartrate (LOPRESSOR) tablet 12.5 mg, 12.5 mg, Oral, TWICE DAILY, Timo Muñoz M.D., 12.5 mg at 06/14/22 0620  •  patiromer (VELTASSA) powder for oral suspension 8.4 g, 8.4 g, Oral, DAILY, Roderick Ramirez M.D., 8.4 g at 06/14/22 0901  •  nystatin (MYCOSTATIN) [id number] UNIT/ML suspension 500,000 Units, 5 mL, Swish & Spit, 4X/DAY, Timo Muñoz M.D., 500,000 Units at 06/14/22 1304  •  insulin regular (HumuLIN R,NovoLIN R) injection, 3-14 Units, Subcutaneous, 4X/DAY ACHS, 4 Units at 06/14/22 1154 **AND** POC blood glucose manual result, , , Q AC AND BEDTIME(S) **AND** NOTIFY MD and PharmD, , , Once **AND** Administer 20 grams of glucose (approximately 8 ounces of fruit juice) every 15 minutes PRN FSBG less than 70 mg/dL, , , PRN **AND**  dextrose 50% (D50W) injection 25 g, 25 g, Intravenous, Q15 MIN PRN, Linsey M Wegener, A.P.R.N.  •  acetaminophen (Tylenol) tablet 650 mg, 650 mg, Oral, Q6HRS PRN, Mehrdad Laughlin M.D., 650 mg at 06/14/22 1037  •  tacrolimus (PROGRAF) capsule 1 mg, 1 mg, Oral, BID, Mehrdad Laughlin M.D., 1 mg at 06/14/22 0620  •  senna-docusate (PERICOLACE or SENOKOT S) 8.6-50 MG per tablet 2 Tablet, 2 Tablet, Oral, BID, 2 Tablet at 06/13/22 0430 **AND** polyethylene glycol/lytes (MIRALAX) PACKET 1 Packet, 1 Packet, Oral, QDAY PRN **AND** [DISCONTINUED] magnesium hydroxide (MILK OF MAGNESIA) suspension 30 mL, 30 mL, Oral, QDAY PRN **AND** bisacodyl (DULCOLAX) suppository 10 mg, 10 mg, Rectal, QDAY PRN, Mehrdad Laughlin M.D.  •  mycophenolate (CELLCEPT) capsule 500 mg, 500 mg, Oral, BID, Mehrdad Laughlin M.D., 500 mg at 06/14/22 0620  •  oxyCODONE immediate-release (ROXICODONE) tablet 5 mg, 5 mg, Oral, Q4HRS PRN, Mehrdad Laughlin M.D.  •  predniSONE (DELTASONE) tablet 5 mg, 5 mg, Oral, DAILY, Mehrdad Laughlin M.D., 5 mg at 06/14/22 0621  •  heparin injection 5,000 Units, 5,000 Units, Subcutaneous, Q12HRS, James Titus M.D., 5,000 Units at 06/14/22 0621  •  heparin injection 2,800 Units, 2,800 Units, Intracatheter, DIALYSIS PRN, James Sheppard M.D., 2,800 Units at 06/02/22 1400  •  Respiratory Therapy Consult, , Nebulization, Continuous RT, Reuben Swartz M.D.  •  sodium chloride (OCEAN) 0.65 % nasal spray 2 Spray, 2 Spray, Nasal, Q2HRS PRN, Kayy Hopkins M.D., 2 [place] at 06/14/22 1150  •  dakins 0.125% (1/4 strength) topical soln, , Topical, BID, Timo Muñoz M.D., 473 mL at 06/14/22 0621  •  HYDROmorphone (Dilaudid) injection 0.5 mg, 0.5 mg, Intravenous, Q2HRS PRN, Tracey Whitfield M.D., 0.5 mg at 05/31/22 0059      Fluids    Intake/Output Summary (Last 24 hours) at 6/14/2022 1358  Last data filed at 6/14/2022 0900  Gross per 24 hour   Intake 440 ml   Output 1450 ml   Net -1010 ml       Laboratory  Recent Labs     06/12/22  0109  06/14/22  0433   WBC 7.6 6.7   RBC 3.61* 3.75*   HEMOGLOBIN 10.2* 10.6*   HEMATOCRIT 33.2* 34.1*   MCV 92.0 90.9   MCH 28.3 28.3   MCHC 30.7* 31.1*   RDW 50.7* 49.1   PLATELETCT 178 162*   MPV 11.4 11.3     Recent Labs     06/12/22  0405 06/13/22  0919 06/14/22  0433   SODIUM 140 139 137   POTASSIUM 5.0 4.8 4.6   CHLORIDE 108 103 101   CO2 22 26 28   GLUCOSE 186* 179* 171*   BUN 57* 46* 39*   CREATININE 1.80* 1.65* 1.47*   CALCIUM 8.1* 8.2* 7.9*                   Imaging  DX-CHEST-PORTABLE (1 VIEW)   Final Result      1.  Interval improvement in bilateral pulmonary opacities, likely improving edema.   2.  Bilateral infrahilar atelectasis versus consolidations persist. No effusions.         CT-HEAD W/O   Final Result      1.  Cerebral atrophy.      2.  Bilateral mastoid effusions.      3.  Otherwise, Head CT without contrast within normal limits. No evidence of acute intracranial hemorrhage or mass lesion.         DX-ABDOMEN FOR TUBE PLACEMENT   Final Result      Enteric feeding tube terminates with the tip projecting over the expected location of the 2nd-3rd portion of the duodenum.      DX-CHEST-LIMITED (1 VIEW)   Final Result      1.  Bibasilar underinflation atelectasis which could obscure an additional process. This is unchanged.   2.  Interstitial opacities likely pulmonary edema   3.  Persistently enlarged cardiac silhouette      DX-CHEST-LIMITED (1 VIEW)   Final Result      1.  Hypoinflation with mildly increased left basilar atelectasis.   2.  Removal of endotracheal tube.      DX-CHEST-LIMITED (1 VIEW)   Final Result         No significant interval change.      DX-CHEST-LIMITED (1 VIEW)   Final Result      Stable areas of patchy atelectasis/consolidation.      DX-CHEST-PORTABLE (1 VIEW)   Final Result      Stable chest x-ray findings.      DX-CHEST-PORTABLE (1 VIEW)   Final Result         1.  Pulmonary edema and/or infiltrates, somewhat increased particularly in the right lung base compared to prior study.    2.  Cardiomegaly      CT-ABDOMEN-PELVIS W/O   Final Result         Limited noncontrast exam      1. Long segment wall thickening from the descending colon to the sigmoid colon could relate to underdistention or colitis. Air-fluid level in the more proximal colon is likely diarrheal disease.      2. Right lower quadrant transplant kidney with retained prior contrast, likely secondary to renal failure/ATN/contrast nephropathy. No hydronephrosis.      Absent right kidney. Polycystic left kidney.      CT-EXTREMITY, LOWER W/O RIGHT   Final Result      1. Postsurgical changes of right total knee arthroplasty.   2. Right knee joint effusion with thickening of the joint capsule and surrounding soft tissue edema.   3. Circumferential edema involving the soft tissues of the right lower leg with an anterior soft tissue defect and with areas of clustering on the medial right lower leg skin surface.   4. Arteriosclerosis.      DX-CHEST-PORTABLE (1 VIEW)   Final Result      1.  Unchanged position of supporting devices are visualized extent   2.  No visible pneumothorax following chest compressions   3.  Unchanged atelectasis and possible superimposed interstitial pulmonary edema or atypical pneumonia      US-EXTREMITY ARTERY LOWER UNILAT RIGHT   Final Result      US-MIRELLA SINGLE LEVEL BILAT   Final Result      US-EXTREMITY ARTERY LOWER BILAT W/MIRELLA (COMBO)   Final Result      DX-CHEST-PORTABLE (1 VIEW)   Final Result         1.  Pulmonary edema and/or infiltrates, somewhat increased particularly in the right lung base compared to prior study.   2.  Cardiomegaly      DX-ABDOMEN FOR TUBE PLACEMENT   Final Result      1. The tip of the feeding tube terminates over the junction of the gastric pylorus and duodenal bulb.   2. The remainder is stable.      DX-CHEST-PORTABLE (1 VIEW)   Final Result      1. Interval decrease in size of the right pleural effusion.   2. No left pleural effusion.   3. No visible pneumothorax status post  bronchoscopy.   4. Interval placement of a Dobbhoff feeding tube.   5. Improving parenchymal loss in the right lung base, incompletely resolved.   6. The remainder is stable.      DX-CHEST-PORTABLE (1 VIEW)   Final Result      1. Interval development of a moderate right pleural effusion after placement of a left internal jugular dialysis catheter, abutting the right lateral wall of the right atrium. Verification of line position with contrast injection under fluoroscopy is    offered.   2. Improved perihilar atelectasis.   3. The remainder is stable.      I, Dr. Matthew Brown, discussed the results of this examination directly by phone with Dr. Reuben Swartz on 5/26/2022 at 0855 hours.      EC-ECHOCARDIOGRAM COMPLETE W/ CONT   Final Result      DX-CHEST-LIMITED (1 VIEW)   Final Result         1. Right internal jugular central venous access catheter placed in the interval terminates over the upper aspect of the right atrium. No postprocedure visible pneumothorax.   2. Stable patchy parenchymal opacities in the lungs, with a stable small left pleural effusion.      DX-CHEST-PORTABLE (1 VIEW)   Final Result         1.  Pulmonary edema and/or infiltrates are identified, which are stable since the prior exam.   2.  Trace bilateral pleural effusions   3.  Cardiomegaly      CT-EXTREMITY, LOWER W/O RIGHT   Final Result      1.  Status post right total knee replacement.      2.  Diffuse atherosclerotic calcification of the arterial system of the right lower extremity suspicious for diabetes mellitus.      3.  Soft tissue attenuation in the subcutaneous tissues of the mid to distal lower leg and foot suspicious for cellulitis.      4.  No evidence of soft tissue ulceration in the right lower leg or foot.      5.  No evidence of acute osteomyelitis in the right lower leg or foot.      6.  Small subcutaneous abscesses cannot be excluded without the use of intravenous contrast.      DX-CHEST-PORTABLE (1 VIEW)   Final  Result      Left internal jugular central venous access catheter terminates over a persistent left superior vena cava. No postprocedure visible pneumothorax.      The remainder is stable.      DX-TIBIA AND FIBULA RIGHT   Final Result      1. Right lower leg soft tissue swelling.   2. No acute fracture or subluxation.   3. Postsurgical changes of right total knee arthroplasty.      US-ABDOMEN F.A.S.T. LTD (FOR ED USE ONLY)   Final Result      No free fluid seen in all 4 quadrants.      Negative FAST scan.            CT-ABDOMEN-PELVIS WITH   Final Result      1. No acute posttraumatic findings in the abdomen or pelvis.   2. Bibasilar subsegmental atelectasis.   3. Right pelvic transplant kidney without hydronephrosis.   4. Polycystic left kidney.   5. Diverticulosis without diverticulitis. Normal appendix.      CT-CTA CHEST PULMONARY ARTERY W/ RECONS   Final Result      1.  No CT evidence of pulmonary emboli.      2.  Bibasilar atelectasis.      3.  No evidence of rib fracture.      4. Diffuse coronary artery calcification.      CT-HEAD W/O   Final Result      1.  Cerebral atrophy.      2.  Otherwise, Head CT without contrast within normal limits. No evidence of acute cerebral infarction, hemorrhage or mass lesion.         CT-CSPINE WITHOUT PLUS RECONS   Final Result      1.  Moderate osteoarthritic changes at the C6-7 level with disc space narrowing and marginal osteophytosis. Further there is moderate cervical spondylotic changes at this level.      2.  No evidence of cervical spine fracture and/or subluxation.      DX-PELVIS-1 OR 2 VIEWS   Final Result      1.  Unremarkable single AP view of the pelvis.      DX-CHEST-LIMITED (1 VIEW)   Final Result      1.  Curvilinear perihilar opacities likely atelectasis and/or parenchymal scarring.      2.  Mild cardiomegaly.           Assessment/Plan  * Acute renal failure superimposed on stage 3a chronic kidney disease (HCC)  Assessment & Plan  Nephrology  consulting  Hemodialysis as per nephrology. Has been off HD as renal function improving, dialysis catheter has been removed  On IV fluid  Monitor vitals, I/O's, labs  Avoid nephrotoxins.  Immunosuppression for hx of renal transplant    Improving    Dysphagia  Assessment & Plan  SLP following, recommended FEES, may advance diet if tolerates    Cellulitis of right lower extremity  Assessment & Plan          S/p debridement 5/24  Wound care following  Completed 10 days course of IV meropenem for bacteremia on 6/6 per ID        Immunosuppression due to chronic steroid use ([facility])  Assessment & Plan  Restart immunosuppression as per nephrology  On antibiotics for infection.    Bacteremia due to Escherichia coli- (present on admission)  Assessment & Plan  Blood culture pos E coli on 5/24. Repeated blood culture negative to date. Source likely sec to RLE cellulitis  Completed 10 days course of IV meropenem (started on 5/28 given new fever and worsening chest x-ray) on 6/6  ID consulted     Acute respiratory failure with hypoxia ([facility])  Assessment & Plan  Intubated 5/26 and extubated 6/1  Mobilize as able  Respiratory protocol  Supplemental oxygen and wean as able      Knee effusion, right  Assessment & Plan  s/p bedside right knee joint aspiration on 5/28.  Synovial fluid analysis reveals hazy priscila fluid, 2638 WBCs with PMN predominance and no crystals seen.  Fluid not consistent with infection.  Culture neg    Cardiac arrest ([facility])  Assessment & Plan  S/p PEA  Monitor labs, vitals.    Acute metabolic encephalopathy  Assessment & Plan  Improving.  Monitor neurochecks  Treating infection as likely etiology    Resolved    Acute on chronic systolic heart failure ([facility])- (present on admission)  Assessment & Plan  With most recent ejection fraction 35%  Be conservative with IV fluid  On beta-blocker, ACEI on hold due to SANKET    Continue to monitor      PAF (paroxysmal atrial fibrillation) ([facility])- (present on admission)  Assessment &  Plan  Hx of pAFib , not on OAC at home.   Outpatient metoprolol 25mg daily on hold due to septic shock and borderline hypotension.  Resumed metoprolol 12.5 mg twice daily on 6/10 with holding parameters  Outpatient cardiology/PCP follow up regards OAC use.     Type 2 diabetes mellitus with hyperlipidemia (HCC)- (present on admission)  Assessment & Plan  On lantus and SSI. Holding home po diabetes meds  Hypoglycemic protocol   A1c 10.7    Thrombocytopenia (HCC)- (present on admission)  Assessment & Plan  Resolved     Hyperkalemia  Assessment & Plan  Low potassium diet  On Veltassa  Continue to monitor    Resolved    Septic shock (HCC)- (present on admission)  Assessment & Plan  Sec to E.coli bacteremia from RLE cellulitis  Completed abx   Resolved       VTE prophylaxis: heparin ppx    I have performed a physical exam and reviewed and updated ROS and Plan today (6/14/2022). In review of yesterday's note (6/13/2022), there are no changes except as documented above.

## 2022-06-14 NOTE — THERAPY
Speech Language Pathology  Daily Treatment     Patient Name: Jama Altman  Age:  66 y.o., Sex:  male  Medical Record #: [id number]  Today's Date: 6/14/2022     Precautions  Precautions: Fall Risk, Swallow Precautions ( See Comments)  Comments: wounds RLE and escoriated perineal area    Assessment    RN communicating that pt's wakefulness improved and asking if FEES is going to be completed. Pt seen during his lunch meal with pt maintaining wakefulness withut cues/stimulation. Pt cont to have dried sores to left side of below naris, left lip, and left chin. Per nurs and pt, these sores are related to his intubation. Pt with dried nasal secretions visualized at tip of nose with flashlight. SLP unable to pass Q-tip bilat naris. Pt does have saline nasal spray at bedside with SLP requesting that nasal spray be used tonight r/t concerns regarding passing FEES scope. Pt triggering 1-2 swallows per tsp amount of puree and and sip of MT2 by straw. No coughing and no change in vocal quality. Of concern, pt with limited ability to sit up with HOB to 50 degrees only r/t buttock pain with family member stating pt has pressure sores. HOB lowered to 35 degrees with pt stating improved comfort at end of session. SLP provided educ to pt/family re follow up FEES and collaborated with MD and RN.     Plan  PU4/MT2 1-1 feeding. FEES Wed AM.   Continue current treatment plan.    Discharge Recommendations: Recommend post-acute placement for additional speech therapy services prior to discharge home       06/14/22 1342   Short Term Goals   Short Term Goal # 1 B  Pt will consume PU4/MT2 without overt signs of aspiration given Elliott.  (progressing as expected.)

## 2022-06-14 NOTE — DISCHARGE PLANNING
No HH referral has been placed. Please have MD place valid referral and then send to  for review.       Thank you

## 2022-06-14 NOTE — ASSESSMENT & PLAN NOTE
With most recent ejection fraction 35%  Be conservative with IV fluid  On beta-blocker, ACEI on hold due to SANKET    Continue to monitor

## 2022-06-14 NOTE — DISCHARGE PLANNING
Agency/Facility Name: [facility]   Spoke To: Edison   Outcome: Per Admissions Coordinator, patient needs to progress in therapy due to the fact he is a max assist and was currently on a cortrak.     Agency[facility]Facility Name: [facility]   Spoke To: Maria Luisa   Outcome:Per Admissions Coordinator, patient is pending answer from DON if needs can be accommodated since  patient is currently  a max assist     Agency/Facility Name: [facility]   Spoke To: Misty   Outcome Per Admissions Coordinator patient is pending acceptance at this time. In addition admissions  will be placed on hold due to  uprising in COVID cases.

## 2022-06-14 NOTE — CARE PLAN
The patient is Stable - Low risk of patient condition declining or worsening    Shift Goals  Clinical Goals: ambulation / movement  Patient Goals: movement  Family Goals: na    Progress made toward(s) clinical / shift goals:    Problem: Knowledge Deficit - Standard  Goal: Patient and family/care givers will demonstrate understanding of plan of care, disease process/condition, diagnostic tests and medications  Outcome: Progressing     Problem: Pain - Standard  Goal: Alleviation of pain or a reduction in pain to the patient’s comfort goal  Outcome: Progressing     Problem: Skin Integrity  Goal: Skin integrity is maintained or improved  Outcome: Progressing     Problem: Fall Risk  Goal: Patient will remain free from falls  Outcome: Progressing     Problem: Discharge Barriers/Planning  Goal: Patient's continuum of care needs are met  Outcome: Progressing     Problem: Urinary Elimination  Goal: Establish and maintain regular urinary output  Outcome: Progressing     Problem: Mobility  Goal: Patient's capacity to carry out activities will improve  Outcome: Progressing     Problem: Self Care  Goal: Patient will have the ability to perform ADLs independently or with assistance (bathe, groom, dress, toilet and feed)  Outcome: Progressing     Problem: Infection - Standard  Goal: Patient will remain free from infection  Outcome: Progressing     Problem: Wound/ / Incision Healing  Goal: Patient's wound/surgical incision will decrease in size and heals properly  Outcome: Progressing       Patient is not progressing towards the following goals:

## 2022-06-14 NOTE — DISCHARGE PLANNING
TASHIA has called facilities with no accepting at this point. Spoke with [facility] coordinator, Nan Castillo,  who states she has talked with family re:  Home Health if no SNF's accepting patients.Coordinator requests Home Health orders.  Coordinator received choice from patient and given to DPA.  Request for Home health orders made to Hospitalist, Suzi Gandara.

## 2022-06-14 NOTE — FACE TO FACE
Face to Face Supporting Documentation - Home Health    The encounter with this patient was in whole or in part the primary reason for home health admission.    Date of encounter:   Patient:                    MRN:                       YOB: 2021  Jama Altman  [id number]  1955     Home health to see patient for:  Skilled Nursing care for assessment, interventions & education, Home health aide, Physical Therapy evaluation and treatment, Occupational therapy evaluation and treatment and Speech Language Pathology evaluation and treatment    Skilled need for:  Comment: Debility    Skilled nursing interventions to include:  Comment: PT OT SLP    Homebound status evidenced by:  Have a condition such that leaving his or her home is medically contraindicated. Leaving home requires a considerable and taxing effort. There is a normal inability to leave the home.    Community Physician to provide follow up care: Ganesh Benton III, M.D.     Optional Interventions? No      I certify the face to face encounter for this home health care referral meets the CMS requirements and the encounter/clinical assessment with the patient was, in whole, or in part, for the medical condition(s) listed above, which is the primary reason for home health care. Based on my clinical findings: the service(s) are medically necessary, support the need for home health care, and the homebound criteria are met.  I certify that this patient has had a face to face encounter by myself.  Suzi Gandara M.D. - NPI: [id number]

## 2022-06-14 NOTE — CARE PLAN
Problem: Pain - Standard  Goal: Alleviation of pain or a reduction in pain to the patient’s comfort goal  Outcome: Progressing  Note: Pain assessed qshift and prn, patient encouraged to notify RN if having pain     Problem: Skin Integrity  Goal: Skin integrity is maintained or improved  Outcome: Progressing  Note: Encouraged to turn and reposition, skin checks qshift and prn, q2 turns if necessary.

## 2022-06-14 NOTE — DISCHARGE PLANNING
MATEUSZ following. HTH/SCP chart review completed. SNF referral previously sent awaiting SNF acceptance.    Nephrology Medicine following mbr  secondary to SANKET likely 2/2 Acute Tubular Necrosis-- noted kidney function is improving.  Appreciate MATEUSZ Patricia's previous notes .Please refer to MATEUSZ Patricia notes 6/10/202 12:20 PM indicating that Mbr could have eventually have 24/7 support from family ( patient lives alone at baseline). In addtiion, family is agreeable to expand choice if mbr is denied from SNF.    Collaborated with DWAINE Haider.  No accepting SNF at this time.  Met with Patient and 2 family mbr at bedside.  Family is requesting for a family meeting to discuss goasl and plan of care- I notified Dr Gandara ( via Voalte) and notified Case Management Team.  I proactively obtained HH choice today 6/14/2022 and gave to Meliza.         Previously completed:  - Orders received for: PT and OT -> rec post acute placement  - Choice forms:  SNF, IRF   - GSC introduced (Y), referral (not sent anticipating placement).

## 2022-06-14 NOTE — DIETARY
Nutrition Services Brief Update:    Day 21 of admit.  Jama Altman is a 66 y.o. male with admitting DX of Septic shock.    Current Diet: Level 4 - pureed, Level 2 - mildly thick, Consistent CHO, Low K+, with supplements. Recorded PO intake has remained poor with pt eating 0% to 25-50% of meals. Providing Ready care shakes with meals, but intake not recorded. Per RN, pt is eating better today with strong encouragement. Will continue to offer shakes at this time due to poor intake.    RD following    Problem: Nutritional:  Goal: Achieve adequate nutritional intake  Description: Patient will consume >50% of meals  Outcome: Progressing slower than expected.

## 2022-06-15 LAB
ANION GAP SERPL CALC-SCNC: 9 MMOL/L (ref 7–16)
BUN SERPL-MCNC: 31 MG/DL (ref 8–22)
CALCIUM SERPL-MCNC: 8.4 MG/DL (ref 8.5–10.5)
CHLORIDE SERPL-SCNC: 101 MMOL/L (ref 96–112)
CO2 SERPL-SCNC: 26 MMOL/L (ref 20–33)
CREAT SERPL-MCNC: 1.39 MG/DL (ref 0.5–1.4)
GFR SERPLBLD CREATININE-BSD FMLA CKD-EPI: 56 ML/MIN/1.73 M 2
GLUCOSE BLD STRIP.AUTO-MCNC: 136 MG/DL (ref 65–99)
GLUCOSE BLD STRIP.AUTO-MCNC: 174 MG/DL (ref 65–99)
GLUCOSE BLD STRIP.AUTO-MCNC: 207 MG/DL (ref 65–99)
GLUCOSE BLD STRIP.AUTO-MCNC: 75 MG/DL (ref 65–99)
GLUCOSE SERPL-MCNC: 82 MG/DL (ref 65–99)
POTASSIUM SERPL-SCNC: 5.3 MMOL/L (ref 3.6–5.5)
SODIUM SERPL-SCNC: 136 MMOL/L (ref 135–145)

## 2022-06-15 PROCEDURE — 770001 HCHG ROOM/CARE - MED/SURG/GYN PRIV*

## 2022-06-15 PROCEDURE — 99232 SBSQ HOSP IP/OBS MODERATE 35: CPT | Performed by: STUDENT IN AN ORGANIZED HEALTH CARE EDUCATION/TRAINING PROGRAM

## 2022-06-15 PROCEDURE — 700111 HCHG RX REV CODE 636 W/ 250 OVERRIDE (IP): Performed by: HOSPITALIST

## 2022-06-15 PROCEDURE — 36415 COLL VENOUS BLD VENIPUNCTURE: CPT

## 2022-06-15 PROCEDURE — 700111 HCHG RX REV CODE 636 W/ 250 OVERRIDE (IP): Performed by: STUDENT IN AN ORGANIZED HEALTH CARE EDUCATION/TRAINING PROGRAM

## 2022-06-15 PROCEDURE — A9270 NON-COVERED ITEM OR SERVICE: HCPCS | Performed by: STUDENT IN AN ORGANIZED HEALTH CARE EDUCATION/TRAINING PROGRAM

## 2022-06-15 PROCEDURE — 700102 HCHG RX REV CODE 250 W/ 637 OVERRIDE(OP): Performed by: STUDENT IN AN ORGANIZED HEALTH CARE EDUCATION/TRAINING PROGRAM

## 2022-06-15 PROCEDURE — 80048 BASIC METABOLIC PNL TOTAL CA: CPT

## 2022-06-15 PROCEDURE — 92612 ENDOSCOPY SWALLOW (FEES) VID: CPT

## 2022-06-15 PROCEDURE — 97530 THERAPEUTIC ACTIVITIES: CPT

## 2022-06-15 PROCEDURE — 82962 GLUCOSE BLOOD TEST: CPT

## 2022-06-15 RX ADMIN — TACROLIMUS 1 MG: 1 CAPSULE ORAL at 06:08

## 2022-06-15 RX ADMIN — METOPROLOL TARTRATE 12.5 MG: 25 TABLET, FILM COATED ORAL at 06:08

## 2022-06-15 RX ADMIN — PREDNISONE 5 MG: 5 TABLET ORAL at 06:08

## 2022-06-15 RX ADMIN — DAKIN'S SOLUTION 0.125% (QUARTER STRENGTH) 20 ML: 0.12 SOLUTION at 06:09

## 2022-06-15 RX ADMIN — PATIROMER 8.4 G: 8.4 POWDER, FOR SUSPENSION ORAL at 06:38

## 2022-06-15 RX ADMIN — MYCOPHENOLATE MOFETIL 500 MG: 250 CAPSULE ORAL at 17:47

## 2022-06-15 RX ADMIN — HEPARIN SODIUM 5000 UNITS: 5000 INJECTION, SOLUTION INTRAVENOUS; SUBCUTANEOUS at 17:47

## 2022-06-15 RX ADMIN — HEPARIN SODIUM 5000 UNITS: 5000 INJECTION, SOLUTION INTRAVENOUS; SUBCUTANEOUS at 06:08

## 2022-06-15 RX ADMIN — DAKIN'S SOLUTION 0.125% (QUARTER STRENGTH) 473 ML: 0.12 SOLUTION at 17:52

## 2022-06-15 RX ADMIN — MYCOPHENOLATE MOFETIL 500 MG: 250 CAPSULE ORAL at 06:09

## 2022-06-15 RX ADMIN — METOPROLOL TARTRATE 12.5 MG: 25 TABLET, FILM COATED ORAL at 17:46

## 2022-06-15 RX ADMIN — TACROLIMUS 1 MG: 1 CAPSULE ORAL at 17:46

## 2022-06-15 ASSESSMENT — COGNITIVE AND FUNCTIONAL STATUS - GENERAL
MOVING TO AND FROM BED TO CHAIR: UNABLE
SUGGESTED CMS G CODE MODIFIER MOBILITY: CN
CLIMB 3 TO 5 STEPS WITH RAILING: TOTAL
TURNING FROM BACK TO SIDE WHILE IN FLAT BAD: UNABLE
WALKING IN HOSPITAL ROOM: TOTAL
STANDING UP FROM CHAIR USING ARMS: TOTAL
MOBILITY SCORE: 6
MOVING FROM LYING ON BACK TO SITTING ON SIDE OF FLAT BED: UNABLE

## 2022-06-15 ASSESSMENT — GAIT ASSESSMENTS: GAIT LEVEL OF ASSIST: UNABLE TO PARTICIPATE

## 2022-06-15 ASSESSMENT — PAIN DESCRIPTION - PAIN TYPE: TYPE: ACUTE PAIN

## 2022-06-15 NOTE — DISCHARGE PLANNING
"TCN following. HTH/SCP chart review completed. Appreciate collaboration with RN Lydia ISIDRO. Patient current discharge plan identified to SNF versus home with HH; although noted patient participation with Physical Therapy 6/13/22 indicated maximal assist with supine to sit and sit to supine.    TCN met with patient at bedside. Attending nurse advised patient \"moving better today than 2 days ago.\" TCN reached out to SLP for consideration of cognitive linguistic consult in combination with already established care for dysphagia in the setting of noted changes to patient overall motivation and possible short term memory concerns. SLP advised they would address as appropriate.    At this time, DWAINE advised she is working with family on scheduling a care conference. Will continue with SNF referrals at this time, given referrals still pending at some facilities. Noted HH also following, currently evaluating for HH appropriateness. As previously noted by TCN 6/10/202 12:20 PM indicating that Mbr could have eventually have 24/7 support from family ( patient lives alone at baseline).    TCN will continue to follow patient and collaboration with discharge planning team as appropriate. TCN is available via Voalte should needs arise necessitating TCN involvement. Thank you.      Previously completed:  - Orders received for: PT, OT, SLP-> rec post acute placement. Appreciate PT/OT/SLP continue to follow  - Choice forms:  SNF, IRF, HH obtained 6/14/22 with current evaluation by HH to determine appropriateness  - GSC introduced (Y), referral (not sent anticipating placement).   "

## 2022-06-15 NOTE — THERAPY
Speech Language Pathology   Fiberoptic Endoscopic Evaluation of Swallow     Patient Name: Jama Atlman  AGE:  66 y.o., SEX:  male  Medical Record #: [id number]  Today's Date: 6/15/2022     Precautions  Precautions: Fall Risk, Swallow Precautions ( See Comments)  Comments: wounds RLE and escoriated perineal area    HPI:  66 y.o. male w/ HTN, DLD, diabetes,  polycystic kidney dz with a renal transplant 9/10/10 and on immunosuppression but has CKD, ADELFO who presented 5/24/2022 with altered mentation.  There was initial concern that he had fallen at home and struck his head.  In the ER he was in atrial fibrillation with a rapid rate of 120-160.  He was given a fluid bolus and initiated on norepinephrine.  He was admitted for severe septic shock likely related to a soft tissue infection of the right leg from where he bumped into something several days ago. During admit he was found to have E.coli bacteremia and right leg cellulitis.  Spinal fluid ws negative for meningitis.  On 5/26 the patient had respiratory distress and was intubated. On 5/28 the patient had PEA arrest and had CPR.  A right knee aspiration was performed and fluid analysis was not showing infection.  On 6/1 the patient was extubated.  The patient had CRRT initiated on 5/27 and daily HD on 5/28 per nephrology notes. Patient shows sign of recovery and has been off dialysis for a few days.  E. coli bacteremia, source likely RLE cellulitis. ID consulted.  Completed iv meropenem with stop date 6/6/2022.            Current Method of Nutrition   Since first FEES 6/3 PU4/MT2, whole pills with MT2 with silent asp on thins per first FEES    Pertinent Information:  Affect/Behavior: Appropriate, Calm  Oxygen Requirements: Room Air  Cortrak: None  Dentition: Good  Factor(s) Affecting Performance: Impaired endurance. Pt with limited toleration of HOB up. HOB to 40 degrees for the FEES with pillow used to promote improved head position.     Discussed the risks,  benefits, and alternatives of the FEES procedure. Patient/family acknowledged and agreed to proceed.     Assessment  Flexible Endoscopic Evaluation of Swallowing (FEES) completed at bedside today. The endoscope was passed transnasally via left nare to evaluate the anatomy and physiology of swallowing. Pt tolerated the procedure with no apparent distress.    Anatomic Findings: Unremarkable  Vocal Fold Motion: Bilateral movement  Secretion Management: WNL  PO trials: ice chips, thin liquids, mildly thick liquids, liquidized, puree, minced & moist, soft & bite sized      Consistency PAS Score Timing Comments   Ice Chips 1 N/A Possible penetration and or aspiration during the swallow with cough post ice chip x1   Thin Liquid 2 Post swallow  Slight to mild amount of thin mild at the interarytenoid space with slight to mild residue at PE segment post thins.   Mildly Thick Liquid 1 N/A    Liquidized 1 N/A    Puree 1 N/A    Minced & Moist 1 N/A    Soft & Bite Sized 1 N/A                  1     No contrast enters airway  2     Contrast enters the airway, remains above the vocal folds, and is ejected from the airway (not seen in the airway at the end of the swallow).  3     Contrast enters the airway, remains above the vocal folds, and is not ejected from the airway (is seen in the airway after the swallow).  4     Contrast enters the airway, contacts the vocal folds, and is ejected from the airway.  5     Contrast enters the airway, contacts the vocal folds, and is not ejected from the airway  6     Contrast enters the airway, crosses the plane of the vocal folds, and is ejected from the airway.  7     Contrast enters the airway, crosses the plane of the vocal folds, and is not ejected from the airway despite effort.  8     Contrast enters the airway, crosses the plane of the vocal folds, is not ejected from the airway and there is no response to aspiration.        Oral phase:  Suspected mild delay with mastication and piece  meal deglutition with small amounts of peach to vallecula following the first swallow.       Pharyngeal phase:  Slight to mild diffuse pharyngeal residue post sips of thins. Slight to mild inter-arytenoid residue post swallow with thins with slight to mild penetration. No aspiration visualized during the FEES but view of the airway variable related to light source of the scope. Pt with slight to mild vallecular residue post applesauce, pudding, Saltine cracker mixed in applesauce (MM5 texture assessment r/t no oatmeal available for the FEES), and diced peaches. Three swallows to masticate and clear single pieces of peaches.         Clinical Impressions  The pt presents with a mild oropharyngeal dysphagia, likely acute on chronic related to complex medical status, hist of intubation, and prolonged hospitalization. Swallow safety is impaired; swallow efficiency is preserved. Risk for aspiration PNA is low on recommended texture. Risk for malnutrition/dehydration is low. A short term modified diet is indicated at this time.. Swallow prognosis is good given pt demonstrating improvement since prior FEES and is more awake and alert. Of concern is pt's limited toleration to have HOB up with FEES completed at 40 degrees. Pt appears to be a good candidate for exercise-based and behavioral swallow rehabilitation. SLP provided educ to the pt and family using the FEES video. SLP collaborated with RN.         Recommendations  1. Upgrade to MM5/MT2 with RN to feed meals today and thereafter family or staff. RN to give sips of thin water from the spoon. SLP to target sips of thin water from the cup.   2.  Swallowing Instructions & Precautions:   Supervision: 1:1 feeding with constant supervision, Direct supervision during meals  Positioning: Fully upright and midline during oral intake  Medication: Whole with liquid  Strategies: Small bites/sips, Alternate bites and sips, Slow rate of intake, Multiple swallows (x 2) per bite/sip  "  Oral Care: Q6h     06/15/22 1200   Patient / Family Goals   Patient / Family Goal #1 \"water\"   Goal #1 Outcome Progressing slower than expected   Short Term Goals   Short Term Goal # 1 Pt will particiate in diagnostic swallow study to confirm/rule out aspiraiton and determine least restrictive diet with min cues.   Goal Outcome # 1 Goal met   Short Term Goal # 1 B  6/15 Pt will consume MM5/MT2 with posted swallow strategies and without s/s of difficulty with superv and or assist for feeding.   Goal Outcome  # 1 B Goal not met   Session Information   Priority 3  (5x, sepsis,intubated x5 days, 2nd FEES=MM5/MT2 1-1, sips of thin water by spoon with RN, SLP to target cups sips thin water. H/O silent asp on first FEES.)       "

## 2022-06-15 NOTE — PROGRESS NOTES
Assumed care of patient at bedside report from day RN. Updated on POC. Patient currently A & O x 4; on room air; up max assist with out complaints of acute pain. Assessment completed at this time.  Call light within reach. Whiteboard updated. Fall precautions in place. Bed locked and in lowest position. All questions answered. No other needs indicated at this time.

## 2022-06-15 NOTE — DISCHARGE PLANNING
After review from our nursing supervisory staff we are requesting patient has 24/7 care givers at time of DC in order to bring on to HH. Also looks like there is mention of a cortrak. We will need this added to the referral as well as orders provided to the organization who will provide nutritional needs.     Thank you

## 2022-06-15 NOTE — DISCHARGE PLANNING
Agency/Facility Name: [facility]   Spoke To: Maria Luisa   Outcome:Per Admissions Coordinator, patient is pending answer from DON if needs can be accommodated since  patient is currently  a max assist     Agency/Facility Name: [facility]   Spoke To: Misty   Outcome Per Admissions Coordinator patient is pending acceptance at this time. In addition admissions  will be placed on hold until Monday due to  uprising in COVID cases.

## 2022-06-15 NOTE — THERAPY
Physical Therapy   Daily Treatment     Patient Name: Jama Altman  Age:  66 y.o., Sex:  male  Medical Record #: [id number]  Today's Date: 6/15/2022     Precautions  Precautions: Fall Risk;Swallow Precautions ( See Comments)  Comments: cellulitis RLE    Assessment  Pt demonstrated fair improvements in sequencing and initiation of activities this date. Continued mod-maxA for bed mobility at this time, and pt completed 2x15 sec standing trials with maxA (unable to fully stand). Recommend continued interventions to improve functional mobility, and recommend SNF for continuum of care.      Plan    Continue current treatment plan.    DC Equipment Recommendations: Unable to determine at this time (defer to facility at next level of care)  Discharge Recommendations: Recommend post-acute placement for additional physical therapy services prior to discharge home      Subjective  Pt agreeable to participate this date, and reported feeling better than before. Pt reported continued back pain during session.     Objective       06/15/22 1349   Precautions   Precautions Fall Risk;Swallow Precautions ( See Comments)   Comments cellulitis RLE   Vitals   Pulse (!) 120   Pulse Oximetry 93 %   O2 Delivery Device None - Room Air   Vitals Comments with activity   Pain   Pain Scales 0 to 10 Scale    Pain 0 - 10 Group   Location Back   Location Orientation Posterior   Pain Rating Scale (NPRS) 6   Cognition    Level of Consciousness Alert   Sequencing Impaired   Comments Improving initiation and sequencing noted this date compared to previous session, but continued to require cues   Sitting Lower Body Exercises   Sitting Lower Body Exercises Yes   Long Arc Quad 1 set of 10   Marching 1 set of 10   Other Exercises 1x10 alt heel/toe raises. Severe difficulties w/R ankle   Balance   Sitting Balance (Static) Fair -   Standing Balance (Static) Trace +   Skilled Intervention Sequencing;Verbal Cuing   Comments Inc BUE support required for sitting  balance, but pt primarily able to complete EOB tasks with SBA-CGA   Gait Analysis   Gait Level Of Assist Unable to Participate   Bed Mobility    Supine to Sit Maximal Assist  (clear legs and minimally assist trunk)   Sit to Supine Moderate Assist  (guide trunk/clear RLE onto the bed)   Scooting Maximal Assist   Rolling Moderate Assist to Rt.;Moderate Assist to Lt.   Skilled Intervention Sequencing;Verbal Cuing   Comments use of rails with improving sequencing   Functional Mobility   Sit to Stand Maximal Assist  (Completed 2x and unable to achieve full upright posture (~70%) due to significant trunk/hip/knee flexion. Difficulties keeping LLE in place to maintain supportive position)   Skilled Intervention Verbal Cuing;Sequencing;Postural Facilitation   How much difficulty does the patient currently have...   Turning over in bed (including adjusting bedclothes, sheets and blankets)? 1   Sitting down on and standing up from a chair with arms (e.g., wheelchair, bedside commode, etc.) 1   Moving from lying on back to sitting on the side of the bed? 1   How much help from another person does the patient currently need...   Moving to and from a bed to a chair (including a wheelchair)? 1   Need to walk in a hospital room? 1   Climbing 3-5 steps with a railing? 1   6 clicks Mobility Score 6   Activity Tolerance   Sitting Edge of Bed 8 minutes   Standing 2x15 seconds (maxA)   Short Term Goals    Short Term Goal # 1 Pt will perform supine <> sit without bed features within 6 visits in order to progress toward PLOF   Goal Outcome # 1 goal not met   Short Term Goal # 2 Pt will perform STS with FWW and min A within 6 visits in order to progress OOB mobility   Goal Outcome # 2 Goal not met   Short Term Goal # 3 Pt will perform functional transfers with FWW and min A within 6 visits in order to progress OOB mobility   Goal Outcome # 3 Goal not met   Short Term Goal # 4 Pt will ambulate 100 ft with FWW and mod A within 6 visits in  order to progress functional mobility   Goal Outcome # 4 Goal not met   Education Group   Education Provided Role of Physical Therapist;Transfer Status   Anticipated Discharge Equipment and Recommendations   DC Equipment Recommendations Unable to determine at this time  (defer to facility at next level of care)   Discharge Recommendations Recommend post-acute placement for additional physical therapy services prior to discharge home   Interdisciplinary Plan of Care Collaboration   IDT Collaboration with  Nursing;Family / Caregiver   Patient Position at End of Therapy In Bed;Call Light within Reach;Tray Table within Reach;Family / Friend in Room   Session Information   Date / Session Number  6/15- 5(2/3,6/19)

## 2022-06-15 NOTE — PROGRESS NOTES
[facility] -  PROGRESS NOTE               Author: Raudel Duron M.D. Date & Time: 6/15/2022  11:30 AM     HPI:  Patient is a 66 year old male with a PMHx of renal transplant 9/10/2010 for polycystic kidney disease, DM, thrombocytopenia, CKD3b, pafib, covid19 infection, who presents for AMS.  He was brought in activated trauma for fall, CT imaging has been negative, but was in severe septic shock started on pressors and give nfluid boluses.  On broad spectrum abx.  Currently complains of right leg pain but thinks its better.  Confirms his last dose of tacrolimus and cellcept was yesterday.  Currently on levophed     DAILY NEPHROLOGY SUMMARY:  5/24: Consult done  5/25: NAEO, no complaints, feels a bit better, family at bedside  5/26: Decompensated overnight, intubated due to resp. Distress and pressors initiated  5/27: NAEO, no complaints, hemodynamics decompensated and CRRT initiated instead, tolerated well overnight, still on it this AM  5/28: transitioned to daily iHD for now, +13.8L for hospitalization, remains intubated, on pressor support, Tmax 101.1F, WBC 28.9  5/29: tolerated HD, UF 3L 5/28, remains intubated and on pressor support  5/30: Tolerated UF, improved hemodynamics, remains on pressors, remains on mechanical ventilation   5/31: Seen on Dialysis, vasc cath sluggish despite TPA, only able to run   6/1: Tolerated HD, extubated, UOP increasing-2.7L yesterday, net neg 5L  6/2: Intermittent encephalopathy per wife,  2.5L UOP yesterday, soft Bps, feeling stronger today  6/3: low Bps, brisk UOP-2.6L, with minimal PO intake, thirsty, NO UF with HD, labs pending  6/4: Denies pain or SOB.  UOP 1.5L  No new labs  6/5: Denies pain or SOB.  Feels thirsty.  Creatinine down to 2.4.  6/6: Denies pain or SOB.  Feels thirsty.  No new labs today.  UOP at 3.1L  6/7: Denies pain or SOB.  Drinking thickened water.  Creatinine down to 2.  6/8: Creatinine continues to trend down.  Still  "has dialysis catheter in place  6/9: No new labs this morning.  On thickened liquids still.  Denies pain or SOB.  Temp HD cath removed.  6/10: Creatinine 1.8, still on thickened liquid.  Potassium 5.6.  Denies pain or SOB  6/11: Denies pain or SOB.  Creatinine 1.9.  Potassium 5.3  6/12: Denies pain or SOB.  Cr down to 1.8.  Potassium 5.0  6/13: Oral intake slightly improving, pending SNIF.   6/14: Net -1L output. Cr 1.47; at baseline. K 4.6. On 1L NC; mIVF discontinued. CXR showing b/l opacities, likely improving edema.  6/15: Net -700mL. Cr 1.39. K 5.3. Saturating well on RA.     REVIEW OF SYSTEMS:    12 systems were reviewed and negative except as above     PMH/PSH/SH/FH:   Reviewed and unchanged since admission note    CURRENT MEDICATIONS:   Reviewed from admission to present day    VS:  /66   Pulse 88   Temp 36.8 °C (98.2 °F) (Temporal)   Resp 18   Ht 1.956 m (6' 5\")   Wt 123 kg (271 lb 6.2 oz)   SpO2 92%   BMI 32.18 kg/m²     Physical Exam  GENERAL: Ill appearing  CV: no pedal edema  RESP:non-labored  GI: Soft  MSK: No joint deformities   SKIN: ecchymoses  NEURO: No tremor  PSYCH: Deferred  Fluids:  In: 800 [P.O.:800]  Out: 1500     LABS:  Recent Labs     06/13/22  0919 06/14/22  0433 06/15/22  0825   SODIUM 139 137 136   POTASSIUM 4.8 4.6 5.3   CHLORIDE 103 101 101   CO2 26 28 26   GLUCOSE 179* 171* 82   BUN 46* 39* 31*   CREATININE 1.65* 1.47* 1.39   CALCIUM 8.2* 7.9* 8.4*       IMAGING:   All imaging reviewed from admission to present day    IMPRESSION:  # SANKET  - Etiology likely 2/2 ATN  - has received contrast  - RRT dependent since 5/27, now with signs of recovery  # DDKT  - 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 Coli bacteremia/septic shock  - Source unclear, ?colitis on imaging  - Abx on board  - pressor support  # Hyperkalemia, improved  - No ECG changes  # Encephalopathy  - Etiology likely 2/2 " hypoperfusion/sepsis  # Acute respiratory failure  - Intubated 5/26  # RLE ulcer  - Trauma  # type 2 DM  # thrombocytopenia  - worsening     PLAN:  -C/w veltassa PO daily; may discontinue when K<4  -Ensure low K diet  -Encourage PO fluid intake, 1.5-2L  -mIVF discontinued 06/14/22 d/t increased oxygen demand; saturating well on RA since yesterday  -renal panel daily   -Holding lasix  -tacrolimus 1mg BID, MMF, Tac levels sent 6/13  -Daily labs and weights  -Monitor I/O  -Dose all meds per eGFR      Nephrology signing off.

## 2022-06-15 NOTE — DISCHARGE PLANNING
Good morning,    This referral has been escalated to a Clinical Supervisor for review in order to determine Home Health appropriateness.  This issue will be resolved as quickly as possible, but for any questions feel free to call us at [phone].  Thank you!

## 2022-06-15 NOTE — PROGRESS NOTES
Hospital Medicine Daily Progress Note    Date of Service  6/15/2022    Chief Complaint  Jama Altman is a 66 y.o. male admitted 5/24/2022 with AMS and fall    Hospital Course  66 y.o. male w/ HTN, DLD, diabetes,  polycystic kidney dz with a renal transplant 9/10/10 and on immunosuppression but has CKD, ADELFO who presented 5/24/2022 with altered mentation.  There was initial concern that he had fallen at home and struck his head.  In the ER he was in atrial fibrillation with a rapid rate of 120-160.  He was given a fluid bolus and initiated on norepinephrine.  He was admitted for severe septic shock likely related to a soft tissue infection of the right leg from where he bumped into something several days ago. During admit he was found to have E.coli bacteremia and right leg cellulitis.  Spinal fluid ws negative for meningitis.  On 5/26 the patient had respiratory distress and was intubated. On 5/28 the patient had PEA arrest and had CPR.  A right knee aspiration was performed and fluid analysis was not showing infection.  On 6/1 the patient was extubated.  The patient had CRRT initiated on 5/27 and daily HD on 5/28 per nephrology notes. Patient shows sign of recovery and has been off dialysis for a few days.  E. coli bacteremia, source likely RLE cellulitis. ID consulted.  Completed iv meropenem with stop date 6/6/2022.     Interval Problem Update  Patient was seen and examined at bedside.  Denies nausea vomiting or pain.     Renal function continued to improve, nephro following  Respiratory status continued to improve, off oxygen today    Wound care following, will decide whether to pursue debridement    PT/OT recommend postacute  Not a candidate for inpatient rehab   Pending SNF    Consultants/Specialty  Infectious disease  Critical care medicine  Nephrology    Code Status  Full Code    Disposition  SNF    Review of Systems  All systems reviewed and negative except as noted per above.    Physical Exam  Temp:   [36.2 °C (97.2 °F)-36.8 °C (98.2 °F)] 36.8 °C (98.2 °F)  Pulse:  [] 120  Resp:  [16-20] 18  BP: (100-115)/(66-71) 103/66  SpO2:  [91 %-95 %] 93 %    General appearance: NAD, conversant, family at bedside  Eyes: anicteric sclerae, moist conjunctivae; no lid-lag; PERRLA, noted subconjunctival hemorrhage, pronounce on R eye   HENT: Atraumatic; oropharynx clear with moist mucous membranes and no mucosal ulcerations; normal hard and soft palate  Neck: Trachea midline; FROM, supple, no thyromegaly or lymphadenopathy  Lungs: CTA, with normal respiratory effort and no intercostal retractions  CV: RRR, no MRGs  Abdomen: Soft, non-tender; no masses or HSM  Extremities: RLE erythema with clean dressing  Skin: Normal temperature, turgor and texture; no rash, ulcers or subcutaneous nodules  Psych: Answering questions      Current Facility-Administered Medications:   •  insulin GLARGINE (Lantus,Semglee) injection, 15 Units, Subcutaneous, BID, Timo Muñoz M.D., 15 Units at 06/14/22 2045  •  metoprolol tartrate (LOPRESSOR) tablet 12.5 mg, 12.5 mg, Oral, TWICE DAILY, Timo Muñoz M.D., 12.5 mg at 06/15/22 0608  •  patiromer (VELTASSA) powder for oral suspension 8.4 g, 8.4 g, Oral, DAILY, Roderick Ramirez M.D., 8.4 g at 06/15/22 0638  •  insulin regular (HumuLIN R,NovoLIN R) injection, 3-14 Units, Subcutaneous, 4X/DAY ACHS, 3 Units at 06/15/22 1215 **AND** POC blood glucose manual result, , , Q AC AND BEDTIME(S) **AND** NOTIFY MD and PharmD, , , Once **AND** Administer 20 grams of glucose (approximately 8 ounces of fruit juice) every 15 minutes PRN FSBG less than 70 mg/dL, , , PRN **AND** dextrose 50% (D50W) injection 25 g, 25 g, Intravenous, Q15 MIN PRN, Linsey M Wegener, A.P.RRudiN.  •  acetaminophen (Tylenol) tablet 650 mg, 650 mg, Oral, Q6HRS PRN, Mehrdad Laughlin M.D., 650 mg at 06/14/22 1037  •  tacrolimus (PROGRAF) capsule 1 mg, 1 mg, Oral, BID, Mehrdad Laughlin M.D., 1 mg at 06/15/22 0608  •  senna-docusate (PERICOLACE or  SENOKOT S) 8.6-50 MG per tablet 2 Tablet, 2 Tablet, Oral, BID, 2 Tablet at 06/13/22 0430 **AND** polyethylene glycol/lytes (MIRALAX) PACKET 1 Packet, 1 Packet, Oral, QDAY PRN **AND** [DISCONTINUED] magnesium hydroxide (MILK OF MAGNESIA) suspension 30 mL, 30 mL, Oral, QDAY PRN **AND** bisacodyl (DULCOLAX) suppository 10 mg, 10 mg, Rectal, QDAY PRN, Mehrdad Laughlin M.D.  •  mycophenolate (CELLCEPT) capsule 500 mg, 500 mg, Oral, BID, Mehrdad Laughlin M.D., 500 mg at 06/15/22 0609  •  oxyCODONE immediate-release (ROXICODONE) tablet 5 mg, 5 mg, Oral, Q4HRS PRN, Mehrdad Laughlin M.D.  •  predniSONE (DELTASONE) tablet 5 mg, 5 mg, Oral, DAILY, Mehrdad Laughlin M.D., 5 mg at 06/15/22 0608  •  heparin injection 5,000 Units, 5,000 Units, Subcutaneous, Q12HRS, James Titus M.D., 5,000 Units at 06/15/22 0608  •  heparin injection 2,800 Units, 2,800 Units, Intracatheter, DIALYSIS PRN, James Sheppard M.D., 2,800 Units at 06/02/22 1400  •  Respiratory Therapy Consult, , Nebulization, Continuous RT, Reuben Swartz M.D.  •  sodium chloride (OCEAN) 0.65 % nasal spray 2 Spray, 2 Spray, Nasal, Q2HRS PRN, Kayy Hopkins M.D., 2 [place] at 06/14/22 1742  •  dakins 0.125% (1/4 strength) topical soln, , Topical, BID, Timo Muñoz M.D., 20 mL at 06/15/22 0609  •  HYDROmorphone (Dilaudid) injection 0.5 mg, 0.5 mg, Intravenous, Q2HRS PRN, Tracey Whitfield M.D., 0.5 mg at 05/31/22 0059      Fluids    Intake/Output Summary (Last 24 hours) at 6/15/2022 1525  Last data filed at 6/15/2022 0945  Gross per 24 hour   Intake 720 ml   Output 1500 ml   Net -780 ml       Laboratory  Recent Labs     06/14/22  0433   WBC 6.7   RBC 3.75*   HEMOGLOBIN 10.6*   HEMATOCRIT 34.1*   MCV 90.9   MCH 28.3   MCHC 31.1*   RDW 49.1   PLATELETCT 162*   MPV 11.3     Recent Labs     06/13/22  0919 06/14/22  0433 06/15/22  0825   SODIUM 139 137 136   POTASSIUM 4.8 4.6 5.3   CHLORIDE 103 101 101   CO2 26 28 26   GLUCOSE 179* 171* 82   BUN 46* 39* 31*   CREATININE 1.65*  1.47* 1.39   CALCIUM 8.2* 7.9* 8.4*                   Imaging  DX-CHEST-PORTABLE (1 VIEW)   Final Result      1.  Interval improvement in bilateral pulmonary opacities, likely improving edema.   2.  Bilateral infrahilar atelectasis versus consolidations persist. No effusions.         CT-HEAD W/O   Final Result      1.  Cerebral atrophy.      2.  Bilateral mastoid effusions.      3.  Otherwise, Head CT without contrast within normal limits. No evidence of acute intracranial hemorrhage or mass lesion.         DX-ABDOMEN FOR TUBE PLACEMENT   Final Result      Enteric feeding tube terminates with the tip projecting over the expected location of the 2nd-3rd portion of the duodenum.      DX-CHEST-LIMITED (1 VIEW)   Final Result      1.  Bibasilar underinflation atelectasis which could obscure an additional process. This is unchanged.   2.  Interstitial opacities likely pulmonary edema   3.  Persistently enlarged cardiac silhouette      DX-CHEST-LIMITED (1 VIEW)   Final Result      1.  Hypoinflation with mildly increased left basilar atelectasis.   2.  Removal of endotracheal tube.      DX-CHEST-LIMITED (1 VIEW)   Final Result         No significant interval change.      DX-CHEST-LIMITED (1 VIEW)   Final Result      Stable areas of patchy atelectasis/consolidation.      DX-CHEST-PORTABLE (1 VIEW)   Final Result      Stable chest x-ray findings.      DX-CHEST-PORTABLE (1 VIEW)   Final Result         1.  Pulmonary edema and/or infiltrates, somewhat increased particularly in the right lung base compared to prior study.   2.  Cardiomegaly      CT-ABDOMEN-PELVIS W/O   Final Result         Limited noncontrast exam      1. Long segment wall thickening from the descending colon to the sigmoid colon could relate to underdistention or colitis. Air-fluid level in the more proximal colon is likely diarrheal disease.      2. Right lower quadrant transplant kidney with retained prior contrast, likely secondary to renal  failure/ATN/contrast nephropathy. No hydronephrosis.      Absent right kidney. Polycystic left kidney.      CT-EXTREMITY, LOWER W/O RIGHT   Final Result      1. Postsurgical changes of right total knee arthroplasty.   2. Right knee joint effusion with thickening of the joint capsule and surrounding soft tissue edema.   3. Circumferential edema involving the soft tissues of the right lower leg with an anterior soft tissue defect and with areas of clustering on the medial right lower leg skin surface.   4. Arteriosclerosis.      DX-CHEST-PORTABLE (1 VIEW)   Final Result      1.  Unchanged position of supporting devices are visualized extent   2.  No visible pneumothorax following chest compressions   3.  Unchanged atelectasis and possible superimposed interstitial pulmonary edema or atypical pneumonia      US-EXTREMITY ARTERY LOWER UNILAT RIGHT   Final Result      US-MIRELLA SINGLE LEVEL BILAT   Final Result      US-EXTREMITY ARTERY LOWER BILAT W/MIRELLA (COMBO)   Final Result      DX-CHEST-PORTABLE (1 VIEW)   Final Result         1.  Pulmonary edema and/or infiltrates, somewhat increased particularly in the right lung base compared to prior study.   2.  Cardiomegaly      DX-ABDOMEN FOR TUBE PLACEMENT   Final Result      1. The tip of the feeding tube terminates over the junction of the gastric pylorus and duodenal bulb.   2. The remainder is stable.      DX-CHEST-PORTABLE (1 VIEW)   Final Result      1. Interval decrease in size of the right pleural effusion.   2. No left pleural effusion.   3. No visible pneumothorax status post bronchoscopy.   4. Interval placement of a Dobbhoff feeding tube.   5. Improving parenchymal loss in the right lung base, incompletely resolved.   6. The remainder is stable.      DX-CHEST-PORTABLE (1 VIEW)   Final Result      1. Interval development of a moderate right pleural effusion after placement of a left internal jugular dialysis catheter, abutting the right lateral wall of the right atrium.  Verification of line position with contrast injection under fluoroscopy is    offered.   2. Improved perihilar atelectasis.   3. The remainder is stable.      I, Dr. Matthew Brown, discussed the results of this examination directly by phone with Dr. Reuben Swartz on 5/26/2022 at 0855 hours.      EC-ECHOCARDIOGRAM COMPLETE W/ CONT   Final Result      DX-CHEST-LIMITED (1 VIEW)   Final Result         1. Right internal jugular central venous access catheter placed in the interval terminates over the upper aspect of the right atrium. No postprocedure visible pneumothorax.   2. Stable patchy parenchymal opacities in the lungs, with a stable small left pleural effusion.      DX-CHEST-PORTABLE (1 VIEW)   Final Result         1.  Pulmonary edema and/or infiltrates are identified, which are stable since the prior exam.   2.  Trace bilateral pleural effusions   3.  Cardiomegaly      CT-EXTREMITY, LOWER W/O RIGHT   Final Result      1.  Status post right total knee replacement.      2.  Diffuse atherosclerotic calcification of the arterial system of the right lower extremity suspicious for diabetes mellitus.      3.  Soft tissue attenuation in the subcutaneous tissues of the mid to distal lower leg and foot suspicious for cellulitis.      4.  No evidence of soft tissue ulceration in the right lower leg or foot.      5.  No evidence of acute osteomyelitis in the right lower leg or foot.      6.  Small subcutaneous abscesses cannot be excluded without the use of intravenous contrast.      DX-CHEST-PORTABLE (1 VIEW)   Final Result      Left internal jugular central venous access catheter terminates over a persistent left superior vena cava. No postprocedure visible pneumothorax.      The remainder is stable.      DX-TIBIA AND FIBULA RIGHT   Final Result      1. Right lower leg soft tissue swelling.   2. No acute fracture or subluxation.   3. Postsurgical changes of right total knee arthroplasty.      US-ABDOMEN F.A.S.T. LTD  (FOR ED USE ONLY)   Final Result      No free fluid seen in all 4 quadrants.      Negative FAST scan.            CT-ABDOMEN-PELVIS WITH   Final Result      1. No acute posttraumatic findings in the abdomen or pelvis.   2. Bibasilar subsegmental atelectasis.   3. Right pelvic transplant kidney without hydronephrosis.   4. Polycystic left kidney.   5. Diverticulosis without diverticulitis. Normal appendix.      CT-CTA CHEST PULMONARY ARTERY W/ RECONS   Final Result      1.  No CT evidence of pulmonary emboli.      2.  Bibasilar atelectasis.      3.  No evidence of rib fracture.      4. Diffuse coronary artery calcification.      CT-HEAD W/O   Final Result      1.  Cerebral atrophy.      2.  Otherwise, Head CT without contrast within normal limits. No evidence of acute cerebral infarction, hemorrhage or mass lesion.         CT-CSPINE WITHOUT PLUS RECONS   Final Result      1.  Moderate osteoarthritic changes at the C6-7 level with disc space narrowing and marginal osteophytosis. Further there is moderate cervical spondylotic changes at this level.      2.  No evidence of cervical spine fracture and/or subluxation.      DX-PELVIS-1 OR 2 VIEWS   Final Result      1.  Unremarkable single AP view of the pelvis.      DX-CHEST-LIMITED (1 VIEW)   Final Result      1.  Curvilinear perihilar opacities likely atelectasis and/or parenchymal scarring.      2.  Mild cardiomegaly.           Assessment/Plan  * Acute renal failure superimposed on stage 3a chronic kidney disease (HCC)  Assessment & Plan  Nephrology consulting  Hemodialysis as per nephrology. Has been off HD as renal function improving, dialysis catheter has been removed  On IV fluid  Monitor vitals, I/O's, labs  Avoid nephrotoxins.  Immunosuppression for hx of renal transplant    Improving    Dysphagia  Assessment & Plan  SLP following   advanced diet per recommendation    Improving    Cellulitis of right lower extremity  Assessment & Plan          S/p debridement  5/24  Wound care following  Completed 10 days course of IV meropenem for bacteremia on 6/6 per ID        Immunosuppression due to chronic steroid use ([facility])  Assessment & Plan  Restart immunosuppression as per nephrology  On antibiotics for infection.    Bacteremia due to Escherichia coli- (present on admission)  Assessment & Plan  Blood culture pos E coli on 5/24. Repeated blood culture negative to date. Source likely sec to RLE cellulitis  Completed 10 days course of IV meropenem (started on 5/28 given new fever and worsening chest x-ray) on 6/6  ID consulted     Acute respiratory failure with hypoxia ([facility])  Assessment & Plan  Intubated 5/26 and extubated 6/1  Mobilize as able  Respiratory protocol  Supplemental oxygen and wean as able      Knee effusion, right  Assessment & Plan  s/p bedside right knee joint aspiration on 5/28.  Synovial fluid analysis reveals hazy priscila fluid, 2638 WBCs with PMN predominance and no crystals seen.  Fluid not consistent with infection.  Culture neg    Cardiac arrest ([facility])  Assessment & Plan  S/p PEA  Monitor labs, vitals.    Acute metabolic encephalopathy  Assessment & Plan  Improving.  Monitor neurochecks  Treating infection as likely etiology    Resolved    Acute on chronic systolic heart failure (HCC)- (present on admission)  Assessment & Plan  With most recent ejection fraction 35%  Be conservative with IV fluid  On beta-blocker, ACEI on hold due to SANKET    Continue to monitor      PAF (paroxysmal atrial fibrillation) ([facility])- (present on admission)  Assessment & Plan  Hx of pAFib , not on OAC at home.   Outpatient metoprolol 25mg daily on hold due to septic shock and borderline hypotension.  Resumed metoprolol 12.5 mg twice daily on 6/10 with holding parameters  Outpatient cardiology/PCP follow up regards OAC use.     Type 2 diabetes mellitus with hyperlipidemia ([facility])- (present on admission)  Assessment & Plan  On lantus and SSI. Holding home po diabetes meds  Hypoglycemic  protocol   A1c 10.7    Thrombocytopenia (HCC)- (present on admission)  Assessment & Plan  Resolved     Hyperkalemia  Assessment & Plan  Low potassium diet  On Veltassa  Continue to monitor    Resolved    Septic shock (HCC)- (present on admission)  Assessment & Plan  Sec to E.coli bacteremia from RLE cellulitis  Completed abx   Resolved       VTE prophylaxis: heparin ppx    I have performed a physical exam and reviewed and updated ROS and Plan today (6/15/2022). In review of yesterday's note (6/14/2022), there are no changes except as documented above.

## 2022-06-16 PROBLEM — S81.801A NON-HEALING WOUND OF RIGHT LOWER EXTREMITY: Status: ACTIVE | Noted: 2022-06-16

## 2022-06-16 LAB
ANION GAP SERPL CALC-SCNC: 8 MMOL/L (ref 7–16)
BUN SERPL-MCNC: 33 MG/DL (ref 8–22)
CALCIUM SERPL-MCNC: 8 MG/DL (ref 8.5–10.5)
CHLORIDE SERPL-SCNC: 102 MMOL/L (ref 96–112)
CO2 SERPL-SCNC: 24 MMOL/L (ref 20–33)
CREAT SERPL-MCNC: 1.52 MG/DL (ref 0.5–1.4)
GFR SERPLBLD CREATININE-BSD FMLA CKD-EPI: 50 ML/MIN/1.73 M 2
GLUCOSE BLD STRIP.AUTO-MCNC: 127 MG/DL (ref 65–99)
GLUCOSE BLD STRIP.AUTO-MCNC: 132 MG/DL (ref 65–99)
GLUCOSE BLD STRIP.AUTO-MCNC: 164 MG/DL (ref 65–99)
GLUCOSE BLD STRIP.AUTO-MCNC: 188 MG/DL (ref 65–99)
GLUCOSE SERPL-MCNC: 146 MG/DL (ref 65–99)
POTASSIUM SERPL-SCNC: 5 MMOL/L (ref 3.6–5.5)
SODIUM SERPL-SCNC: 134 MMOL/L (ref 135–145)

## 2022-06-16 PROCEDURE — 97535 SELF CARE MNGMENT TRAINING: CPT | Mod: CO

## 2022-06-16 PROCEDURE — 770001 HCHG ROOM/CARE - MED/SURG/GYN PRIV*

## 2022-06-16 PROCEDURE — A9270 NON-COVERED ITEM OR SERVICE: HCPCS | Performed by: HOSPITALIST

## 2022-06-16 PROCEDURE — 700102 HCHG RX REV CODE 250 W/ 637 OVERRIDE(OP): Performed by: STUDENT IN AN ORGANIZED HEALTH CARE EDUCATION/TRAINING PROGRAM

## 2022-06-16 PROCEDURE — 82962 GLUCOSE BLOOD TEST: CPT | Mod: 91

## 2022-06-16 PROCEDURE — 700111 HCHG RX REV CODE 636 W/ 250 OVERRIDE (IP): Performed by: HOSPITALIST

## 2022-06-16 PROCEDURE — 99232 SBSQ HOSP IP/OBS MODERATE 35: CPT | Performed by: STUDENT IN AN ORGANIZED HEALTH CARE EDUCATION/TRAINING PROGRAM

## 2022-06-16 PROCEDURE — 36415 COLL VENOUS BLD VENIPUNCTURE: CPT

## 2022-06-16 PROCEDURE — 700111 HCHG RX REV CODE 636 W/ 250 OVERRIDE (IP): Performed by: STUDENT IN AN ORGANIZED HEALTH CARE EDUCATION/TRAINING PROGRAM

## 2022-06-16 PROCEDURE — A9270 NON-COVERED ITEM OR SERVICE: HCPCS | Performed by: STUDENT IN AN ORGANIZED HEALTH CARE EDUCATION/TRAINING PROGRAM

## 2022-06-16 PROCEDURE — 700102 HCHG RX REV CODE 250 W/ 637 OVERRIDE(OP): Performed by: HOSPITALIST

## 2022-06-16 PROCEDURE — 97597 DBRDMT OPN WND 1ST 20 CM/<: CPT

## 2022-06-16 PROCEDURE — 80048 BASIC METABOLIC PNL TOTAL CA: CPT

## 2022-06-16 RX ORDER — AMOXICILLIN 250 MG
2 CAPSULE ORAL 2 TIMES DAILY PRN
Status: DISCONTINUED | OUTPATIENT
Start: 2022-06-16 | End: 2022-06-22 | Stop reason: HOSPADM

## 2022-06-16 RX ORDER — BISACODYL 10 MG
10 SUPPOSITORY, RECTAL RECTAL
Status: DISCONTINUED | OUTPATIENT
Start: 2022-06-16 | End: 2022-06-22 | Stop reason: HOSPADM

## 2022-06-16 RX ORDER — POLYETHYLENE GLYCOL 3350 17 G/17G
1 POWDER, FOR SOLUTION ORAL
Status: DISCONTINUED | OUTPATIENT
Start: 2022-06-16 | End: 2022-06-22 | Stop reason: HOSPADM

## 2022-06-16 RX ADMIN — TACROLIMUS 1 MG: 1 CAPSULE ORAL at 17:37

## 2022-06-16 RX ADMIN — HEPARIN SODIUM 5000 UNITS: 5000 INJECTION, SOLUTION INTRAVENOUS; SUBCUTANEOUS at 05:33

## 2022-06-16 RX ADMIN — PATIROMER 8.4 G: 8.4 POWDER, FOR SUSPENSION ORAL at 14:12

## 2022-06-16 RX ADMIN — DOCUSATE SODIUM 50 MG AND SENNOSIDES 8.6 MG 2 TABLET: 8.6; 5 TABLET, FILM COATED ORAL at 05:34

## 2022-06-16 RX ADMIN — DAKIN'S SOLUTION 0.125% (QUARTER STRENGTH) 100 ML: 0.12 SOLUTION at 03:00

## 2022-06-16 RX ADMIN — ACETAMINOPHEN 650 MG: 325 TABLET ORAL at 21:29

## 2022-06-16 RX ADMIN — TACROLIMUS 1 MG: 1 CAPSULE ORAL at 05:32

## 2022-06-16 RX ADMIN — MYCOPHENOLATE MOFETIL 500 MG: 250 CAPSULE ORAL at 17:37

## 2022-06-16 RX ADMIN — HEPARIN SODIUM 5000 UNITS: 5000 INJECTION, SOLUTION INTRAVENOUS; SUBCUTANEOUS at 17:40

## 2022-06-16 RX ADMIN — METOPROLOL TARTRATE 12.5 MG: 25 TABLET, FILM COATED ORAL at 17:38

## 2022-06-16 RX ADMIN — METOPROLOL TARTRATE 12.5 MG: 25 TABLET, FILM COATED ORAL at 05:32

## 2022-06-16 RX ADMIN — MYCOPHENOLATE MOFETIL 500 MG: 250 CAPSULE ORAL at 05:33

## 2022-06-16 RX ADMIN — PREDNISONE 5 MG: 5 TABLET ORAL at 05:32

## 2022-06-16 ASSESSMENT — PAIN DESCRIPTION - PAIN TYPE
TYPE: ACUTE PAIN

## 2022-06-16 NOTE — ASSESSMENT & PLAN NOTE
RLE Arterial ultrasound 5/2022 showed mild arterial insufficiency, no significant stenosis or occlusion   S/p debridement 5/24, 6/16 by wound care  Wound care following, recommended plastic surgeon consult  I talked to plastic surgeon , vascular surgery evaluation prior to any surgery.     Angiogram done by Dr. Lopez, good perfusion and no need for revasculariztion.

## 2022-06-16 NOTE — CARE PLAN
The patient is Stable - Low risk of patient condition declining or worsening    Shift Goals  Clinical Goals: Reduce incontinent episode  Patient Goals: Sleep      Progress made toward(s) clinical / shift goals:  Condom cath in use to decrease/improve IAD.  Quiet and dark area provided to pt to aid in sleep.       Problem: Infection - Standard  Goal: Patient will remain free from infection  Outcome: Progressing     Problem: Wound/ / Incision Healing  Goal: Patient's wound/surgical incision will decrease in size and heals properly  Outcome: Progressing       Patient is not progressing towards the following goals:

## 2022-06-16 NOTE — PROGRESS NOTES
"Assumed care of patient at 0700 from Sommer MONTENEGRO. Patient is A&Ox 4 but confused in conversation, states pain is \"about the same\" but cant provide a numerical value, declines intervention. Bed locked in lowest position with 2 rails up. Call light in place, belongings at bedside. Patient expresses zero  needs at this time and hourly rounding is in place. Bed alarm on for high fall risk.   "

## 2022-06-16 NOTE — DISCHARGE PLANNING
Agency/Facility Name: [facility]   Spoke To: Edison   Outcome: Per Admissions Coordinator, patient needs to progress in his mobility and therapies  since patient is currently a max assist  will need the required staffing to accommodate patients needs      Agency[facility]Fac[facility] Name[facility]   Spoke To: Maria Luisa   Outcome:Per Admissions Coordinator, patient is pending answer from DON if needs can be accommodated since  patient is currently  a max assist  will need the required staffing to accommodate patients needs.    Agency/Facility Name: [facility]   Spoke To: Admissions   Outcome: DPA left voicemail regarding patients referral.   **DPA hard faxed referral to ensure fax has been given correctly**     Agency/Facility Name: [facility]   Spoke To: Admissions   Outcome: DPA left voicemail regarding patients referral.  **DPA resending refer vi hard fax to ensure fax has been given correctly**     Agency/Facility Name: Replaced by [facility]   Spoke To: Admissions   Outcome: DPA left voicemail regarding patients referral.    **DPA resending refer vi hard fax to ensure fax has been given correctly**

## 2022-06-16 NOTE — CARE PLAN
The patient is Stable - Low risk of patient condition declining or worsening    Shift Goals  Clinical Goals: Wound dressing change, increase P.O intake  Patient Goals: Comfort, eat  Family Goals: N/A    Progress made toward(s) clinical / shift goals:      Patient is not progressing towards the following goals: Pt refused to mobilize with OT this morning, RN had asked prior and pt refused then as well. Wound nurse to assess and change pt wound dressing, new dressing CDI      Problem: Discharge Barriers/Planning  Goal: Patient's continuum of care needs are met  Outcome: Not Progressing     Problem: Mobility  Goal: Patient's capacity to carry out activities will improve  Outcome: Not Progressing

## 2022-06-16 NOTE — PROGRESS NOTES
Hospital Medicine Daily Progress Note    Date of Service  6/16/2022    Chief Complaint  Jama Altman is a 66 y.o. male admitted 5/24/2022 with AMS and fall    Hospital Course  66 y.o. male w/ HTN, DLD, diabetes,  polycystic kidney dz with a renal transplant 9/10/10 and on immunosuppression but has CKD, ADELFO who presented 5/24/2022 with altered mentation.  There was initial concern that he had fallen at home and struck his head.  In the ER he was in atrial fibrillation with a rapid rate of 120-160.  He was given a fluid bolus and initiated on norepinephrine.  He was admitted for severe septic shock likely related to a soft tissue infection of the right leg from where he bumped into something several days ago. During admit he was found to have E.coli bacteremia and right leg cellulitis.  Spinal fluid ws negative for meningitis.  On 5/26 the patient had respiratory distress and was intubated. On 5/28 the patient had PEA arrest and had CPR.  A right knee aspiration was performed and fluid analysis was not showing infection.  On 6/1 the patient was extubated.  The patient had CRRT initiated on 5/27 and daily HD on 5/28 per nephrology notes. Patient shows sign of recovery and has been off dialysis for a few days.  E. coli bacteremia, source likely RLE cellulitis. ID consulted.  Completed iv meropenem with stop date 6/6/2022.     Interval Problem Update  Patient was seen and examined at bedside.  Denies nausea vomiting or pain.     Pending wound care recommendation    Patient reported diarrhea with stool softener.  Change Senokot to as needed    SANKET resolved, baseline at 1.5s, off IV fluid  Respiratory distress resolved, off oxygen    PT/OT recommend postacute  Not a candidate for inpatient rehab   Pending SNF    Consultants/Specialty  Infectious disease  Critical care medicine  Nephrology    Code Status  Full Code    Disposition  SNF    Review of Systems  All systems reviewed and negative except as noted per  above.    Physical Exam  Temp:  [36.7 °C (98 °F)-37.1 °C (98.7 °F)] 37.1 °C (98.7 °F)  Pulse:  [] 88  Resp:  [17-18] 17  BP: ()/(49-60) 105/60  SpO2:  [91 %-95 %] 91 %    General appearance: NAD, conversant, family at bedside  Eyes: anicteric sclerae, moist conjunctivae; no lid-lag; PERRLA, noted subconjunctival hemorrhage, pronounce on R eye   HENT: Atraumatic; oropharynx clear with moist mucous membranes and no mucosal ulcerations; normal hard and soft palate  Neck: Trachea midline; FROM, supple, no thyromegaly or lymphadenopathy  Lungs: CTA, with normal respiratory effort and no intercostal retractions  CV: RRR, no MRGs  Abdomen: Soft, non-tender; no masses or HSM  Extremities: RLE erythema with clean dressing  Skin: Normal temperature, turgor and texture; no rash, ulcers or subcutaneous nodules  Psych: Answering questions      Current Facility-Administered Medications:   •  senna-docusate (PERICOLACE or SENOKOT S) 8.6-50 MG per tablet 2 Tablet, 2 Tablet, Oral, BID PRN **AND** polyethylene glycol/lytes (MIRALAX) PACKET 1 Packet, 1 Packet, Oral, QDAY PRN **AND** [DISCONTINUED] magnesium hydroxide (MILK OF MAGNESIA) suspension 30 mL, 30 mL, Oral, QDAY PRN **AND** bisacodyl (DULCOLAX) suppository 10 mg, 10 mg, Rectal, QDAY PRN, Suzi Gandara M.D.  •  insulin GLARGINE (Lantus,Semglee) injection, 15 Units, Subcutaneous, BID, Timo Muñoz M.D., 15 Units at 06/16/22 0820  •  metoprolol tartrate (LOPRESSOR) tablet 12.5 mg, 12.5 mg, Oral, TWICE DAILY, Timo Muñoz M.D., 12.5 mg at 06/16/22 0532  •  patiromer (VELTASSA) powder for oral suspension 8.4 g, 8.4 g, Oral, DAILY, Roderick Ramirez M.D., 8.4 g at 06/15/22 0638  •  insulin regular (HumuLIN R,NovoLIN R) injection, 3-14 Units, Subcutaneous, 4X/DAY ACHS, 3 Units at 06/16/22 0820 **AND** POC blood glucose manual result, , , Q AC AND BEDTIME(S) **AND** NOTIFY MD and PharmD, , , Once **AND** Administer 20 grams of glucose (approximately 8 ounces of fruit juice) every  15 minutes PRN FSBG less than 70 mg/dL, , , PRN **AND** dextrose 50% (D50W) injection 25 g, 25 g, Intravenous, Q15 MIN PRN, Linsey M Wegener, A.P.R.N.  •  acetaminophen (Tylenol) tablet 650 mg, 650 mg, Oral, Q6HRS PRN, Mehrdad Laughlin M.D., 650 mg at 06/14/22 1037  •  tacrolimus (PROGRAF) capsule 1 mg, 1 mg, Oral, BID, Mehrdad Laughlin M.D., 1 mg at 06/16/22 0532  •  mycophenolate (CELLCEPT) capsule 500 mg, 500 mg, Oral, BID, Mehrdad Laughlin M.D., 500 mg at 06/16/22 0533  •  oxyCODONE immediate-release (ROXICODONE) tablet 5 mg, 5 mg, Oral, Q4HRS PRN, Mehrdad Laughlin M.D.  •  predniSONE (DELTASONE) tablet 5 mg, 5 mg, Oral, DAILY, Mehrdad Laughlin M.D., 5 mg at 06/16/22 0532  •  heparin injection 5,000 Units, 5,000 Units, Subcutaneous, Q12HRS, James Titus M.D., 5,000 Units at 06/16/22 0533  •  heparin injection 2,800 Units, 2,800 Units, Intracatheter, DIALYSIS PRN, James Sheppard M.D., 2,800 Units at 06/02/22 1400  •  Respiratory Therapy Consult, , Nebulization, Continuous RT, Reuben Swartz M.D.  •  sodium chloride (OCEAN) 0.65 % nasal spray 2 Spray, 2 Spray, Nasal, Q2HRS PRN, Kayy Hopkins M.D., 2 [place] at 06/14/22 1742  •  dakins 0.125% (1/4 strength) topical soln, , Topical, BID, Timo Muñoz M.D., 100 mL at 06/16/22 0300  •  HYDROmorphone (Dilaudid) injection 0.5 mg, 0.5 mg, Intravenous, Q2HRS PRN, Tracey Whitfield M.D., 0.5 mg at 05/31/22 0059      Fluids    Intake/Output Summary (Last 24 hours) at 6/16/2022 1013  Last data filed at 6/16/2022 0900  Gross per 24 hour   Intake 720 ml   Output 1000 ml   Net -280 ml       Laboratory  Recent Labs     06/14/22  0433   WBC 6.7   RBC 3.75*   HEMOGLOBIN 10.6*   HEMATOCRIT 34.1*   MCV 90.9   MCH 28.3   MCHC 31.1*   RDW 49.1   PLATELETCT 162*   MPV 11.3     Recent Labs     06/14/22  0433 06/15/22  0825 06/16/22  0141   SODIUM 137 136 134*   POTASSIUM 4.6 5.3 5.0   CHLORIDE 101 101 102   CO2 28 26 24   GLUCOSE 171* 82 146*   BUN 39* 31* 33*   CREATININE 1.47* 1.39  1.52*   CALCIUM 7.9* 8.4* 8.0*                   Imaging  DX-CHEST-PORTABLE (1 VIEW)   Final Result      1.  Interval improvement in bilateral pulmonary opacities, likely improving edema.   2.  Bilateral infrahilar atelectasis versus consolidations persist. No effusions.         CT-HEAD W/O   Final Result      1.  Cerebral atrophy.      2.  Bilateral mastoid effusions.      3.  Otherwise, Head CT without contrast within normal limits. No evidence of acute intracranial hemorrhage or mass lesion.         DX-ABDOMEN FOR TUBE PLACEMENT   Final Result      Enteric feeding tube terminates with the tip projecting over the expected location of the 2nd-3rd portion of the duodenum.      DX-CHEST-LIMITED (1 VIEW)   Final Result      1.  Bibasilar underinflation atelectasis which could obscure an additional process. This is unchanged.   2.  Interstitial opacities likely pulmonary edema   3.  Persistently enlarged cardiac silhouette      DX-CHEST-LIMITED (1 VIEW)   Final Result      1.  Hypoinflation with mildly increased left basilar atelectasis.   2.  Removal of endotracheal tube.      DX-CHEST-LIMITED (1 VIEW)   Final Result         No significant interval change.      DX-CHEST-LIMITED (1 VIEW)   Final Result      Stable areas of patchy atelectasis/consolidation.      DX-CHEST-PORTABLE (1 VIEW)   Final Result      Stable chest x-ray findings.      DX-CHEST-PORTABLE (1 VIEW)   Final Result         1.  Pulmonary edema and/or infiltrates, somewhat increased particularly in the right lung base compared to prior study.   2.  Cardiomegaly      CT-ABDOMEN-PELVIS W/O   Final Result         Limited noncontrast exam      1. Long segment wall thickening from the descending colon to the sigmoid colon could relate to underdistention or colitis. Air-fluid level in the more proximal colon is likely diarrheal disease.      2. Right lower quadrant transplant kidney with retained prior contrast, likely secondary to renal failure/ATN/contrast  nephropathy. No hydronephrosis.      Absent right kidney. Polycystic left kidney.      CT-EXTREMITY, LOWER W/O RIGHT   Final Result      1. Postsurgical changes of right total knee arthroplasty.   2. Right knee joint effusion with thickening of the joint capsule and surrounding soft tissue edema.   3. Circumferential edema involving the soft tissues of the right lower leg with an anterior soft tissue defect and with areas of clustering on the medial right lower leg skin surface.   4. Arteriosclerosis.      DX-CHEST-PORTABLE (1 VIEW)   Final Result      1.  Unchanged position of supporting devices are visualized extent   2.  No visible pneumothorax following chest compressions   3.  Unchanged atelectasis and possible superimposed interstitial pulmonary edema or atypical pneumonia      US-EXTREMITY ARTERY LOWER UNILAT RIGHT   Final Result      US-MIRELLA SINGLE LEVEL BILAT   Final Result      US-EXTREMITY ARTERY LOWER BILAT W/MIRELLA (COMBO)   Final Result      DX-CHEST-PORTABLE (1 VIEW)   Final Result         1.  Pulmonary edema and/or infiltrates, somewhat increased particularly in the right lung base compared to prior study.   2.  Cardiomegaly      DX-ABDOMEN FOR TUBE PLACEMENT   Final Result      1. The tip of the feeding tube terminates over the junction of the gastric pylorus and duodenal bulb.   2. The remainder is stable.      DX-CHEST-PORTABLE (1 VIEW)   Final Result      1. Interval decrease in size of the right pleural effusion.   2. No left pleural effusion.   3. No visible pneumothorax status post bronchoscopy.   4. Interval placement of a Dobbhoff feeding tube.   5. Improving parenchymal loss in the right lung base, incompletely resolved.   6. The remainder is stable.      DX-CHEST-PORTABLE (1 VIEW)   Final Result      1. Interval development of a moderate right pleural effusion after placement of a left internal jugular dialysis catheter, abutting the right lateral wall of the right atrium. Verification of line  position with contrast injection under fluoroscopy is    offered.   2. Improved perihilar atelectasis.   3. The remainder is stable.      I, Dr. Matthew Brown, discussed the results of this examination directly by phone with Dr. Reuben Swartz on 5/26/2022 at 0855 hours.      EC-ECHOCARDIOGRAM COMPLETE W/ CONT   Final Result      DX-CHEST-LIMITED (1 VIEW)   Final Result         1. Right internal jugular central venous access catheter placed in the interval terminates over the upper aspect of the right atrium. No postprocedure visible pneumothorax.   2. Stable patchy parenchymal opacities in the lungs, with a stable small left pleural effusion.      DX-CHEST-PORTABLE (1 VIEW)   Final Result         1.  Pulmonary edema and/or infiltrates are identified, which are stable since the prior exam.   2.  Trace bilateral pleural effusions   3.  Cardiomegaly      CT-EXTREMITY, LOWER W/O RIGHT   Final Result      1.  Status post right total knee replacement.      2.  Diffuse atherosclerotic calcification of the arterial system of the right lower extremity suspicious for diabetes mellitus.      3.  Soft tissue attenuation in the subcutaneous tissues of the mid to distal lower leg and foot suspicious for cellulitis.      4.  No evidence of soft tissue ulceration in the right lower leg or foot.      5.  No evidence of acute osteomyelitis in the right lower leg or foot.      6.  Small subcutaneous abscesses cannot be excluded without the use of intravenous contrast.      DX-CHEST-PORTABLE (1 VIEW)   Final Result      Left internal jugular central venous access catheter terminates over a persistent left superior vena cava. No postprocedure visible pneumothorax.      The remainder is stable.      DX-TIBIA AND FIBULA RIGHT   Final Result      1. Right lower leg soft tissue swelling.   2. No acute fracture or subluxation.   3. Postsurgical changes of right total knee arthroplasty.      US-ABDOMEN F.A.S.T. LTD (FOR ED USE ONLY)    Final Result      No free fluid seen in all 4 quadrants.      Negative FAST scan.            CT-ABDOMEN-PELVIS WITH   Final Result      1. No acute posttraumatic findings in the abdomen or pelvis.   2. Bibasilar subsegmental atelectasis.   3. Right pelvic transplant kidney without hydronephrosis.   4. Polycystic left kidney.   5. Diverticulosis without diverticulitis. Normal appendix.      CT-CTA CHEST PULMONARY ARTERY W/ RECONS   Final Result      1.  No CT evidence of pulmonary emboli.      2.  Bibasilar atelectasis.      3.  No evidence of rib fracture.      4. Diffuse coronary artery calcification.      CT-HEAD W/O   Final Result      1.  Cerebral atrophy.      2.  Otherwise, Head CT without contrast within normal limits. No evidence of acute cerebral infarction, hemorrhage or mass lesion.         CT-CSPINE WITHOUT PLUS RECONS   Final Result      1.  Moderate osteoarthritic changes at the C6-7 level with disc space narrowing and marginal osteophytosis. Further there is moderate cervical spondylotic changes at this level.      2.  No evidence of cervical spine fracture and/or subluxation.      DX-PELVIS-1 OR 2 VIEWS   Final Result      1.  Unremarkable single AP view of the pelvis.      DX-CHEST-LIMITED (1 VIEW)   Final Result      1.  Curvilinear perihilar opacities likely atelectasis and/or parenchymal scarring.      2.  Mild cardiomegaly.           Assessment/Plan  * Acute renal failure superimposed on stage 3a chronic kidney disease (HCC)  Assessment & Plan  Nephrology consulting  Hemodialysis as per nephrology. Has been off HD as renal function improving, dialysis catheter has been removed  On IV fluid  Monitor vitals, I/O's, labs  Avoid nephrotoxins.  Immunosuppression for hx of renal transplant    Improving    Dysphagia  Assessment & Plan  SLP following   advanced diet per recommendation    Improving    Cellulitis of right lower extremity  Assessment & Plan          S/p debridement 5/24  Wound care  following  Completed 10 days course of IV meropenem for bacteremia on 6/6 per ID        Immunosuppression due to chronic steroid use ([facility])  Assessment & Plan  Restart immunosuppression as per nephrology  On antibiotics for infection.    Bacteremia due to Escherichia coli- (present on admission)  Assessment & Plan  Blood culture pos E coli on 5/24. Repeated blood culture negative to date. Source likely sec to RLE cellulitis  Completed 10 days course of IV meropenem (started on 5/28 given new fever and worsening chest x-ray) on 6/6  ID consulted     Acute respiratory failure with hypoxia ([facility])  Assessment & Plan  Intubated 5/26 and extubated 6/1  Mobilize as able  Respiratory protocol  Supplemental oxygen and wean as able      Knee effusion, right  Assessment & Plan  s/p bedside right knee joint aspiration on 5/28.  Synovial fluid analysis reveals hazy priscila fluid, 2638 WBCs with PMN predominance and no crystals seen.  Fluid not consistent with infection.  Culture neg    Cardiac arrest ([facility])  Assessment & Plan  S/p PEA  Monitor labs, vitals.    Acute metabolic encephalopathy  Assessment & Plan  Improving.  Monitor neurochecks  Treating infection as likely etiology    Resolved    Acute on chronic systolic heart failure (HCC)- (present on admission)  Assessment & Plan  With most recent ejection fraction 35%  Be conservative with IV fluid  On beta-blocker, ACEI on hold due to SANKET    Continue to monitor      PAF (paroxysmal atrial fibrillation) ([facility])- (present on admission)  Assessment & Plan  Hx of pAFib , not on OAC at home.   Outpatient metoprolol 25mg daily on hold due to septic shock and borderline hypotension.  Resumed metoprolol 12.5 mg twice daily on 6/10 with holding parameters  Outpatient cardiology/PCP follow up regards OAC use.     Type 2 diabetes mellitus with hyperlipidemia ([facility])- (present on admission)  Assessment & Plan  On lantus and SSI. Holding home po diabetes meds  Hypoglycemic protocol   A1c  10.7    Thrombocytopenia (HCC)- (present on admission)  Assessment & Plan  Resolved     Hyperkalemia  Assessment & Plan  Low potassium diet  On Veltassa  Continue to monitor    Resolved    Septic shock (HCC)- (present on admission)  Assessment & Plan  Sec to E.coli bacteremia from RLE cellulitis  Completed abx   Resolved       VTE prophylaxis: heparin ppx    I have performed a physical exam and reviewed and updated ROS and Plan today (6/16/2022). In review of yesterday's note (6/15/2022), there are no changes except as documented above

## 2022-06-16 NOTE — DISCHARGE PLANNING
1025: RN CM made call to patient's son, Afshin Altman [phone], regarding a request for a family meeting with the attending, nursing staff, PT/OT, and case management. A family meeting was previously requested and planned for 0900 on Dalia 10, 2022; however, patient's family did not show up at the planned time. RN CM was notified by family that they had been waiting for a confirmation call and did not receive one. However, LAN ISIDRO had verbalized in the conversation when setting up the meeting, that we would shoot for 0900 on Dalia 10, 2022, and that a follow-up call would be made if the time needed to be changed to after the interdisciplinary team rounds. RN CM had spoke to attending, charge nurse, and bedside nurse nurse to set up the aforementioned meeting. All were agreeable.     Today, Hugo CHILD for [facility] indicates patient's son is still requesting a family meeting to discuss goals of care, plan of care, and address each discipline at the same time. RN CM spoke with patient's son by phone and assisted in setting family meeting at 0900 on June 17, 2022. Hugo CHILD present for phone call with patient's son. Attending, charge RN, bedside RN, Unit Manager, PT/OT, and CM leadership notified and agreeable to scheduled time.

## 2022-06-16 NOTE — DISCHARGE PLANNING
"TCN following. H/SCP chart review completed. Collaborated with Primary CM Lydia and discussed at length DC plan for this mbr.  I had requested on 6/14/2022 to Float CM Marcos that family is requesting care conference. Care conference has not occurred yet.Given, this I requested CM to call this member's son Afshin to set up care conference.  The Primary CM  Lydia and I called Afshin and spoke to him via speaker phone to which he agreed to a care conference on 6/17/2022 tentative 9 AM.  This TCN again notified Dr Juliocesar in person regarding Afshin's request for care conference. I also met patient at bedside and notified him regarding care conference tentatively scheduled for 6/17/2022 at 9AM    [facility] Care, IRF and SNF referral  Requests were  previously sent by CM/ DC planning team and currently  pending acceptance at : [facility], [facility], [facility], [facility], [facility] ; [facility], [facility].      Previous notes by fellow MATEUSZ Patricia on 6/10./2022 12:20 PM indicated \"...that if pt is denied again from SNFs with additional/new information, family is agreeable to expand choice....\". Furthermore, \"   pt could eventually have 24/7 support from family either in his home or at another family members home (as at baseline pt lives alone)...\"    Will defer this mbr to Primary CM and Discharge planning Team and CM to contact TCN if additional post-acute needs arise. Thank You        Previously completed:  - Orders received for: PT, OT, SLP-> rec post acute placement. Appreciate PT/OT/SLP continue to follow  - Choice forms:  SNF, IRF, HH obtained 6/14/22 with current evaluation by HH to determine appropriateness  - [facility] introduced (Y), referral (not sent anticipating placement).              "

## 2022-06-16 NOTE — THERAPY
"Missed Therapy     Patient Name: Jama Altman  Age:  66 y.o., Sex:  male  Medical Record #: [id number]  Today's Date: 6/16/2022    Discussed missed therapy with RN    Attempted to see pt for OT tx. Pt declined at this time waiting for breakfast as he \"has not eaten in a long time.\" Breakfast currently not present, education/encouraged pt for OOB activity. Pt continued to state that he wanted to eat. Pt agreeable to later time and verbalized understanding. Will try again later if time permits.   "

## 2022-06-16 NOTE — PROGRESS NOTES
Rec'd report from day shift RN. Assumed pt care. Assessment completed. AA&OX3, reoriented to time. Denies pain at this time. No s/s of discomfort or distress. Q2 hour turns enforced. Dressing to RLE, bilat heels are CDI. Orange cap cream applied to sacrum/buttock. Bed in lowest position, bed locked, bed alarm on for safety, RN and CNA numbers provided, call light within reach.

## 2022-06-16 NOTE — WOUND TEAM
[facility]  Inpatient Services  Wound and Skin Care Evaluation    Admission Date: 5/24/2022     Last order of IP CONSULT TO WOUND CARE was found on 5/24/2022 from Hospital Encounter on 5/24/2022     HPI, PMH, SH: Reviewed    Past Surgical History:   Procedure Laterality Date   • KNEE MANIPULATION  2/16/2012    Performed by LATOYA CONNER [facility]   • KNEE UNICOMPARTMENTAL  12/23/2011    [facility]   • KNEE ARTHROSCOPY  12/23/2011    [facility]   • KNEE ARTHROSCOPY  5/3/2011    Performed by HANANE GOLDMAN at SURGERY [facility] DAY [facility]S   • MENISCECTOMY, KNEE, MEDIAL  5/3/2011    Performed by HANANE GOLDMAN at SURGERY SAME DAY [facility] ORS   • OTHER  9/10/10    RIGHT KIDNEY TRANSPLANT   • KNEE ARTHROPLASTY TOTAL  1/12/07    RIGHT   • KNEE ARTHROSCOPY  4/10/06    RIGHT   • OTHER ORTHOPEDIC SURGERY  7/8/74    LEFT KNEE DEBRIDEMENT     Social History     Tobacco Use   • Smoking status: Never Smoker   • Smokeless tobacco: Never Used   Substance Use Topics   • Alcohol use: No     Chief Complaint   Patient presents with   • GLF     Diagnosis: Septic shock (HCC) [A41.9, R65.21]    Unit where seen by Wound Team: S114/00     WOUND CONSULT/FOLLOW UP RELATED TO:  Follow up - RLE wound, L knee    WOUND HISTORY:  Patient presented to ED after ground level fall. Hx type 2 DM, stage III CKD, afib.+ AMS and RLE pain on admission. Pt admitted with sepsis secondary to bacteremia. Source is likely cellulitis. Wound to RLE likely from fall.     6/1 Wound consult received for open blistering to right leg and right foot, intact blister to left knee, and excoriation to buttocks    WOUND ASSESSMENT/LDA    Wound 05/25/22 Soft Tissue Necrosis Leg;Foot Right & Left knee (Active)   Wound Image      06/16/22 1400   Site Assessment Eschar;Slough;Pale;Pink \   Periwound Assessment Fragile;Pink    Margins Defined  edges;Unattached edges    Closure Secondary intention    Drainage Amount Small    Drainage Description Serosanguineous    Treatments Cleansed;CSWD - Conservative Sharp Wound Debridement;Site care    Wound Cleansing Approved Wound Cleanser    Periwound Protectant Barrier Paste    Dressing Cleansing/Solutions 1/4 Strength Dakin's Solution    Dressing Options Moist Roll Gauze;Mepilex    Dressing Changed Changed    Dressing Status Clean;Dry;Intact    Dressing Change/Treatment Frequency Every Shift, and As Needed    NEXT Dressing Change/Treatment Date 06/17/22    NEXT Weekly Photo (Inpatient Only) 06/23/22    Shape Irregular, oval    Wound Odor Mild    Exposed Structures Adipose;Fascia    WOUND NURSE ONLY - Time Spent with Patient (mins) 60      Vascular:    MIRELLA:   No results found.    Lab Values:    Lab Results   Component Value Date/Time    WBC 6.7 06/14/2022 04:33 AM    RBC 3.75 (L) 06/14/2022 04:33 AM    HEMOGLOBIN 10.6 (L) 06/14/2022 04:33 AM    HEMATOCRIT 34.1 (L) 06/14/2022 04:33 AM    CREACTPROT 4.82 (H) 06/03/2022 09:42 AM    SEDRATEWES <1 08/19/2020 07:16 AM    HBA1C 10.7 (H) 05/24/2022 03:25 PM        Culture Results show:  No results found for this or any previous visit (from the past 720 hour(s)).    Pain Level/Medicated:  Intermittent pain during wound care, but tolerated without pharmacological intervention    INTERVENTIONS BY WOUND TEAM:  Chart and images reviewed. Discussed with bedside RN. All areas of concern (based on picture review, LDA review and discussion with bedside RN) have been thoroughly assessed. Documentation of areas based on significant findings. This RN in to assess patient. Performed standard wound care which includes appropriate positioning, dressing removal and non-selective debridement. Pictures and measurements obtained weekly if/when required.  Preparation for Dressing removal: Removed without difficulty  Non-selectively Debrided with:  Cleanser and gauze.  Sharp debridement:  "Eschar, slough, and non viable tissue debrided away using forceps, scalpel, and scalpel. <20 cm2 debrided. No bleeding noted.  Nadeen wound: Cleansed with cleanser and gauze. Periwound prepped with thin layer of barrier paste.  Primary Dressing:    Left Knee: Dakin's moistened 1/2\" packing strip   RLE & R foot: Dakin's moistened roll gauze  Secondary Dressing   Left knee: Heel mepilex cut in half   RLE & R foot: Mepilex    Interdisciplinary consultation: Patient, Bedside RN, Hospitalist (Dr. Gandara)    EVALUATION / RATIONALE FOR TREATMENT:  Most Recent Date:  6/16/22: L knee with dried blood clots and tunneling underlying eschar. RLE wounds with liquefying adipose and loosely adhered slough & eschar that was debrided away to layer of adherent slough. Wounds appear fully evolved and periwound is less fragile. Based on extensiveness of wounds, recommend pursuing surgical debridement. MD Gandara updated.    6/8/22: RLE wound continuing to evolve, and will likely worsen before improving. Dakins applied over areas with eschar to chemically debride as patient unable to tolerate a lot of CSWD. Viscopaste to periwound as it is extremely fragile and beginning to tear. L knee bullae lanced and serosanguinous fluid expressed, intact skin underlying bullae.  6/1: Right pretibial wound bed now covered almost entirely with slough. Decrease in undermining. Continue with Dakin's packing. Scattered partial thickness wounds now present to RLE circumferentially as well as right foot. Appears to be soft tissue necrosis vs ischemic injury. MD updated. Left knee with dark red and purple discoloration. May evolve similarly to RLE wounds. Left GABBY as skin is still intact. MASD noted to buttocks. Miconazole ordered for antifungal and viscopaste applied. Viscopatch zinc impregnated gauze to encourage re-epithelialization of superficial 100% viable wound bed, and to provide a non-stick wound contact layer.  5/25: RLE edematous and painful to touch. CMS " inact. Small, detached nonviable tissue trimmed from distal aspect of wound. Large amount of tan drainage expressed from wound with continued oozing throughout wound care. Plain strip packing packed into undermining area. Dakin's ordered for future changes to chemically debride wound and manage bioburden. Compression applied to help reduce swelling. Wound Team to follow.     Goals: Steady decrease in wound area and depth weekly.    WOUND TEAM PLAN OF CARE ([X] for frequency of wound follow up,):   Nursing to follow orders written for wound care. Contact wound team if area fails to progress, deteriorates or with any questions/concerns  Dressing changes by wound team:                   Follow up 3 times weekly:                NPWT change 3 times weekly:     Follow up 1-2 times weekly:  X weekly  For L knee and RLE  Follow up Bi-Monthly:                   Follow up as needed:     Other (explain):     NURSING PLAN OF CARE ORDERS (X):  Dressing changes: See Dressing Care orders: X  Skin care: See Skin Care orders:   RN Prevention Protocol:   Rectal tube care: See Rectal Tube Care orders:   Other orders:    Anticipated discharge plans:  LTACH:        SNF/Rehab:           X?       Home Health Care:           Outpatient Wound Center:            Self/Family Care:        Other:                  Vac Discharge Needs:   Not Applicable Pt not on a wound vac:  X     Regular Vac while inpatient, alternative dressing at DC:        Regular Vac in use and continued at DC:            Reg. Vac w/ Skin Sub/Biologic in use. Will need to be changed 2x wkly:      Veraflo Vac while inpatient, ok to transition to Regular Vac on Discharge:          Veraflo Vac while inpatient, will need to remain on Veraflo Vac upon discharge:

## 2022-06-17 ENCOUNTER — APPOINTMENT (OUTPATIENT)
Dept: RADIOLOGY | Facility: MEDICAL CENTER | Age: 67
DRG: 870 | End: 2022-06-17
Attending: SURGERY
Payer: MEDICARE

## 2022-06-17 LAB
ANION GAP SERPL CALC-SCNC: 11 MMOL/L (ref 7–16)
BUN SERPL-MCNC: 33 MG/DL (ref 8–22)
CALCIUM SERPL-MCNC: 8.6 MG/DL (ref 8.5–10.5)
CHLORIDE SERPL-SCNC: 102 MMOL/L (ref 96–112)
CO2 SERPL-SCNC: 23 MMOL/L (ref 20–33)
CREAT SERPL-MCNC: 1.73 MG/DL (ref 0.5–1.4)
GFR SERPLBLD CREATININE-BSD FMLA CKD-EPI: 43 ML/MIN/1.73 M 2
GLUCOSE BLD STRIP.AUTO-MCNC: 108 MG/DL (ref 65–99)
GLUCOSE BLD STRIP.AUTO-MCNC: 126 MG/DL (ref 65–99)
GLUCOSE BLD STRIP.AUTO-MCNC: 131 MG/DL (ref 65–99)
GLUCOSE BLD STRIP.AUTO-MCNC: 146 MG/DL (ref 65–99)
GLUCOSE SERPL-MCNC: 112 MG/DL (ref 65–99)
POTASSIUM SERPL-SCNC: 5.1 MMOL/L (ref 3.6–5.5)
SODIUM SERPL-SCNC: 136 MMOL/L (ref 135–145)

## 2022-06-17 PROCEDURE — 97530 THERAPEUTIC ACTIVITIES: CPT

## 2022-06-17 PROCEDURE — 700111 HCHG RX REV CODE 636 W/ 250 OVERRIDE (IP): Performed by: HOSPITALIST

## 2022-06-17 PROCEDURE — 770001 HCHG ROOM/CARE - MED/SURG/GYN PRIV*

## 2022-06-17 PROCEDURE — B4101ZZ FLUOROSCOPY OF ABDOMINAL AORTA USING LOW OSMOLAR CONTRAST: ICD-10-PCS | Performed by: SURGERY

## 2022-06-17 PROCEDURE — 700105 HCHG RX REV CODE 258: Performed by: STUDENT IN AN ORGANIZED HEALTH CARE EDUCATION/TRAINING PROGRAM

## 2022-06-17 PROCEDURE — 700101 HCHG RX REV CODE 250: Performed by: STUDENT IN AN ORGANIZED HEALTH CARE EDUCATION/TRAINING PROGRAM

## 2022-06-17 PROCEDURE — 75710 ARTERY X-RAYS ARM/LEG: CPT | Mod: 26,RT | Performed by: SURGERY

## 2022-06-17 PROCEDURE — 700111 HCHG RX REV CODE 636 W/ 250 OVERRIDE (IP): Performed by: SURGERY

## 2022-06-17 PROCEDURE — 36140 INTRO NDL ICATH UPR/LXTR ART: CPT | Mod: RT

## 2022-06-17 PROCEDURE — 80048 BASIC METABOLIC PNL TOTAL CA: CPT

## 2022-06-17 PROCEDURE — 99221 1ST HOSP IP/OBS SF/LOW 40: CPT | Mod: 25 | Performed by: SURGERY

## 2022-06-17 PROCEDURE — 700111 HCHG RX REV CODE 636 W/ 250 OVERRIDE (IP): Performed by: STUDENT IN AN ORGANIZED HEALTH CARE EDUCATION/TRAINING PROGRAM

## 2022-06-17 PROCEDURE — 36200 PLACE CATHETER IN AORTA: CPT | Mod: 59 | Performed by: SURGERY

## 2022-06-17 PROCEDURE — 36247 INS CATH ABD/L-EXT ART 3RD: CPT | Mod: RT | Performed by: SURGERY

## 2022-06-17 PROCEDURE — A9270 NON-COVERED ITEM OR SERVICE: HCPCS | Performed by: STUDENT IN AN ORGANIZED HEALTH CARE EDUCATION/TRAINING PROGRAM

## 2022-06-17 PROCEDURE — 700102 HCHG RX REV CODE 250 W/ 637 OVERRIDE(OP): Performed by: STUDENT IN AN ORGANIZED HEALTH CARE EDUCATION/TRAINING PROGRAM

## 2022-06-17 PROCEDURE — 97110 THERAPEUTIC EXERCISES: CPT

## 2022-06-17 PROCEDURE — 700117 HCHG RX CONTRAST REV CODE 255: Performed by: SURGERY

## 2022-06-17 PROCEDURE — B41F1ZZ FLUOROSCOPY OF RIGHT LOWER EXTREMITY ARTERIES USING LOW OSMOLAR CONTRAST: ICD-10-PCS | Performed by: SURGERY

## 2022-06-17 PROCEDURE — 82962 GLUCOSE BLOOD TEST: CPT | Mod: 91

## 2022-06-17 PROCEDURE — 99233 SBSQ HOSP IP/OBS HIGH 50: CPT | Performed by: STUDENT IN AN ORGANIZED HEALTH CARE EDUCATION/TRAINING PROGRAM

## 2022-06-17 PROCEDURE — 36415 COLL VENOUS BLD VENIPUNCTURE: CPT

## 2022-06-17 RX ORDER — IODIXANOL 270 MG/ML
40 INJECTION, SOLUTION INTRAVASCULAR ONCE
Status: COMPLETED | OUTPATIENT
Start: 2022-06-17 | End: 2022-06-17

## 2022-06-17 RX ORDER — SODIUM CHLORIDE 9 MG/ML
500 INJECTION, SOLUTION INTRAVENOUS
Status: ACTIVE | OUTPATIENT
Start: 2022-06-17 | End: 2022-06-17

## 2022-06-17 RX ORDER — SODIUM CHLORIDE 9 MG/ML
INJECTION, SOLUTION INTRAVENOUS CONTINUOUS
Status: ACTIVE | OUTPATIENT
Start: 2022-06-17 | End: 2022-06-17

## 2022-06-17 RX ORDER — MIDAZOLAM HYDROCHLORIDE 1 MG/ML
.5-2 INJECTION INTRAMUSCULAR; INTRAVENOUS PRN
Status: ACTIVE | OUTPATIENT
Start: 2022-06-17 | End: 2022-06-17

## 2022-06-17 RX ADMIN — MIDAZOLAM HYDROCHLORIDE 0.5 MG: 1 INJECTION, SOLUTION INTRAMUSCULAR; INTRAVENOUS at 11:42

## 2022-06-17 RX ADMIN — MYCOPHENOLATE MOFETIL 500 MG: 250 CAPSULE ORAL at 17:54

## 2022-06-17 RX ADMIN — DAKIN'S SOLUTION 0.125% (QUARTER STRENGTH) 473 ML: 0.12 SOLUTION at 02:00

## 2022-06-17 RX ADMIN — METOPROLOL TARTRATE 12.5 MG: 25 TABLET, FILM COATED ORAL at 05:49

## 2022-06-17 RX ADMIN — METOPROLOL TARTRATE 12.5 MG: 25 TABLET, FILM COATED ORAL at 17:54

## 2022-06-17 RX ADMIN — PREDNISONE 5 MG: 5 TABLET ORAL at 05:34

## 2022-06-17 RX ADMIN — DAKIN'S SOLUTION 0.125% (QUARTER STRENGTH) 1 ML: 0.12 SOLUTION at 16:38

## 2022-06-17 RX ADMIN — MYCOPHENOLATE MOFETIL 500 MG: 250 CAPSULE ORAL at 05:34

## 2022-06-17 RX ADMIN — SODIUM CHLORIDE: 9 INJECTION, SOLUTION INTRAVENOUS at 09:45

## 2022-06-17 RX ADMIN — HEPARIN SODIUM 5000 UNITS: 5000 INJECTION, SOLUTION INTRAVENOUS; SUBCUTANEOUS at 17:55

## 2022-06-17 RX ADMIN — METOPROLOL TARTRATE 12.5 MG: 25 TABLET, FILM COATED ORAL at 17:55

## 2022-06-17 RX ADMIN — IODIXANOL 40 ML: 270 INJECTION, SOLUTION INTRAVASCULAR at 13:00

## 2022-06-17 RX ADMIN — FENTANYL CITRATE 25 MCG: 50 INJECTION, SOLUTION INTRAMUSCULAR; INTRAVENOUS at 11:42

## 2022-06-17 RX ADMIN — TACROLIMUS 1 MG: 1 CAPSULE ORAL at 17:54

## 2022-06-17 RX ADMIN — TACROLIMUS 1 MG: 1 CAPSULE ORAL at 05:34

## 2022-06-17 ASSESSMENT — COGNITIVE AND FUNCTIONAL STATUS - GENERAL
SUGGESTED CMS G CODE MODIFIER MOBILITY: CM
MOVING TO AND FROM BED TO CHAIR: UNABLE
STANDING UP FROM CHAIR USING ARMS: A LOT
MOVING FROM LYING ON BACK TO SITTING ON SIDE OF FLAT BED: UNABLE
WALKING IN HOSPITAL ROOM: TOTAL
TURNING FROM BACK TO SIDE WHILE IN FLAT BAD: A LOT
MOBILITY SCORE: 8
CLIMB 3 TO 5 STEPS WITH RAILING: TOTAL

## 2022-06-17 ASSESSMENT — PAIN DESCRIPTION - PAIN TYPE
TYPE: ACUTE PAIN

## 2022-06-17 ASSESSMENT — GAIT ASSESSMENTS: GAIT LEVEL OF ASSIST: UNABLE TO PARTICIPATE

## 2022-06-17 NOTE — CARE PLAN
The patient is Stable - Low risk of patient condition declining or worsening    Shift Goals  Clinical Goals: wound care  Patient Goals: sleep      Progress made toward(s) clinical / shift goals:  Wound care orders updated by wound team, instructions carried out for wound care.       Problem: Skin Integrity  Goal: Skin integrity is maintained or improved  Outcome: Progressing       Patient is not progressing towards the following goals: Per day shift, pt is not always agreeable to do PT or OT.       Problem: Self Care  Goal: Patient will have the ability to perform ADLs independently or with assistance (bathe, groom, dress, toilet and feed)  Outcome: Not Progressing

## 2022-06-17 NOTE — DISCHARGE PLANNING
[facility] T[facility] chart review completed. Collaborated with DWAINE Freeman MD, therapy manager, nursing unit manager, bedside RN and pt with family present for care conference. See CM note from today for details re: outcome of conference. Note that upon eventual dc to home pt will have 24/7 assist from family as needed. Given current presentation and potential, anticipate pt would be best served with some form of post acute placement for increased therapy and skilled nursing care to optimize pt functional outcomes, progress independence as able and reduction of caregiver burden prior to dc to home. Pt is not medically cleared at this time and note is awaiting angiogram today as well. Would note that CM is updating/following up with initial referrals for possible acceptance at SNF (and would note per therapy notes, pt does appear to be making progression and has confirmed assist upon eventual dc to home). Would also note that pt does sometimes decline therapy though tends to do best with family present or reminders as his short term cognition appears to be impaired currently. From chart review and discussions with therapy, declinations appear to be at times 2' to cognitive deficits and pt may need reinforcement of importance of therapy for progression and for appropriate dc planning for optimal pt outcomes. TCN will continue to follow and collaborate with discharge planning team as additional post acute needs arise. Thank you.    Previously completed:  - Orders received for: PT, OT, SLP -> rec placement  - Choice forms: HH, SNF, IRF (rehab essentially declines at this time; more SNF appropriate per review)  - [facility] introduced (Y), referral (not sent).

## 2022-06-17 NOTE — DIETARY
Nutrition Services Brief Update:    Day 24 of admit.  Jama Altman is a 66 y.o. male with admitting DX of Septic shock.    Current Diet: NPO for procedure. Pt has been receiving Level 5 - minced and moist, Level 2 - mildly thick diet. Providing chocolate Ready Care shakes with meals. Recorded PO intake variable, but pt recently with more meals >50%.     Problem: Nutritional:  Goal: Achieve adequate nutritional intake  Description: Patient will consume >50% of meals  Outcome: Progressing

## 2022-06-17 NOTE — THERAPY
Physical Therapy   Daily Treatment     Patient Name: Jama Altman  Age:  66 y.o., Sex:  male  Medical Record #: [id number]  Today's Date: 6/17/2022     Precautions  Precautions: Fall Risk;Swallow Precautions ( See Comments)    Assessment    Pt is agreeable to treatment session. Pt states he feels weak because of earlier procedure and not being allowed to eat. Pt demonstrated marked improvements in activity tolerance and amount of time tolerated at EOB. Pt able to stand with max A and bear weight through B LE with minimal buckling. Pt able to achieve full upright standing posture with assistance. Pt returned to bed and educated on HEP (detailed below). Continue with POC.     Plan    Continue current treatment plan.    DC Equipment Recommendations: Unable to determine at this time  Discharge Recommendations: Recommend post-acute placement for additional physical therapy services prior to discharge home      Objective     06/17/22 1540   Precautions   Precautions Fall Risk;Swallow Precautions ( See Comments)   Pain 0 - 10 Group   Location Back   Location Orientation Lower;Left   Pain Rating Scale (NPRS) 8   Comfort Goal 1;Comfort with Movement;Perform Activity   Cognition    Comments Per report pt demonstrated marked improvements in cognition and being able to converse. Pt did not have a delay in conversation or command following. And was able to have insight into his current situation.   Passive ROM Lower Body   Passive ROM Lower Body WDL   Active ROM Lower Body    Active ROM Lower Body  X   Comments L LE not formally tested due to recent procedure.   Strength Lower Body   Lower Body Strength  X   Comments B LE grossly weak, B 4-/5   Sitting Lower Body Exercises   Sitting Lower Body Exercises Yes   Long Arc Quad 1 set of 10   Marching 1 set of 10   Home Exercise Program   Comments Pt educated on HEP of having HOB elevated to improve upright tolerance and prevent orthostatic hypotension, Pt given a written handout and  review quad sets, glute sets, heel slides, and ankle pumps. Pt educated to perform rolling to improve bed mobility and strength at least every other hour.   Balance   Sitting Balance (Static) Fair -   Sitting Balance (Dynamic) Fair -   Standing Balance (Static) Poor -   Standing Balance (Dynamic) Trace +   Weight Shift Sitting Poor   Weight Shift Standing Poor   Skilled Intervention Compensatory Strategies;Postural Facilitation;Sequencing;Verbal Cuing;Tactile Cuing   Comments Required B UE support at EOB   Gait Analysis   Gait Level Of Assist Unable to Participate   Weight Bearing Status No restrictions   Bed Mobility    Supine to Sit Moderate Assist   Sit to Supine Moderate Assist   Scooting Moderate Assist   Rolling Minimal Assist to Rt.;Minimum Assist to Lt.   Skilled Intervention Compensatory Strategies;Facilitation;Tactile Cuing;Verbal Cuing;Sequencing;Postural Facilitation   Functional Mobility   Sit to Stand Maximal Assist   Bed, Chair, Wheelchair Transfer Unable to Participate   Skilled Intervention Compensatory Strategies;Facilitation;Postural Facilitation;Sequencing;Tactile Cuing;Verbal Cuing   Comments Increased bed height, pt able to anteriorly weight shift and participate in standing. Able to stand for approx 30 seconds with ability to hold weight without buckiling. Pt c/o nausea, possibly indicating orthostatic hypotension/diaphoresis   How much difficulty does the patient currently have...   Turning over in bed (including adjusting bedclothes, sheets and blankets)? 2   Sitting down on and standing up from a chair with arms (e.g., wheelchair, bedside commode, etc.) 1   Moving from lying on back to sitting on the side of the bed? 1   How much help from another person does the patient currently need...   Moving to and from a bed to a chair (including a wheelchair)? 2   Need to walk in a hospital room? 1   Climbing 3-5 steps with a railing? 1   6 clicks Mobility Score 8   Activity Tolerance   Sitting Edge  of Bed 20 minutes   Standing 30 seconds   Short Term Goals    Short Term Goal # 1 Pt will perform supine <> sit without bed features within 6 visits in order to progress toward PLOF   Goal Outcome # 1 goal not met   Short Term Goal # 2 Pt will perform STS with FWW and min A within 6 visits in order to progress OOB mobility   Goal Outcome # 2 Goal not met   Short Term Goal # 3 Pt will perform functional transfers with FWW and min A within 6 visits in order to progress OOB mobility   Goal Outcome # 3 Goal not met   Short Term Goal # 4 Pt will ambulate 100 ft with FWW and mod A within 6 visits in order to progress functional mobility   Goal Outcome # 4 Goal not met   Education Group   Education Provided Role of Physical Therapist;Exercises - Supine   Role of Physical Therapist Patient Response Patient;Acceptance;Explanation;Verbal Demonstration   Exercises - Supine Patient Response Patient;Acceptance;Handout;Verbal Demonstration;Reinforcement Needed   Exercises - Seated Patient Response Patient;Acceptance;Explanation;Action Demonstration;Reinforcement Needed   Anticipated Discharge Equipment and Recommendations   DC Equipment Recommendations Unable to determine at this time   Discharge Recommendations Recommend post-acute placement for additional physical therapy services prior to discharge home   Interdisciplinary Plan of Care Collaboration   IDT Collaboration with  Nursing   Patient Position at End of Therapy In Bed;Bed Alarm On;Call Light within Reach;Tray Table within Reach;Phone within Reach   Collaboration Comments RN aware of session   Session Information   Date / Session Number  6/17-6(3/3, 6/19)

## 2022-06-17 NOTE — PROGRESS NOTES
Rec'd report from day shift RN. Assumed pt care. Assessment completed. AA&OX3, reoriented to time. Denies pain at this time. No s/s of discomfort or distress. Q2 hour turns enforced. Dressing to RLE, left heel is CDI. Orange cap cream applied to sacrum/buttock. Bed in lowest position, bed locked, bed alarm on for safety, RN and CNA numbers provided, call light within reach.

## 2022-06-17 NOTE — DISCHARGE PLANNING
ATTN: Case Management  RE: Referral for Home Health    As of 6/17/22, we have accepted the Home Health referral for the patient listed above. Per CM family will be providing 24/7 care at home.    A [facility] clinician will be out to see the patient within 48 hours. If you have any questions or concerns regarding the patient's transition to Home Health, please do not hesitate to contact us at [phone].      We look forward to collaborating with you,  [facility] Team

## 2022-06-17 NOTE — PROGRESS NOTES
Assumed care of patient at 0700 from Sommer MONTENEGRO. Patient is A&Ox 4, states pain level is 0/10. Bed locked in lowest position with 2 rails up. Call light in place, belongings at bedside. Patient expresses zero needs at this time and hourly rounding is in place. Bed alarm on for high fall risk.

## 2022-06-17 NOTE — DISCHARGE PLANNING
After review from our nursing supervisory staff we are requesting patient has 24/7 care givers at time of DC in order to bring on to HH. Also looks like there is mention of a cortrak. We will need this added to the referral as well as orders provided to the organization who will provide nutritional needs.      Referral on hold.     Thanks,  [facility]

## 2022-06-17 NOTE — PROGRESS NOTES
Pt presents to OR 19. Pt's son, Afshin, was consented by MD via telephone, 2 RN witness, confirmed by this RN and consent at bedside. Patient underwent a right lower extremity angiography, possible intervention by Dr. Lopez. Site marked and initialed by MD prior to procedure starting and verified by patient and procedure team. Pedal pulses prior to case Right dopplerable, marked and Left dopplerable, marked. Patient was placed in a supine position. Left femoral artery was accessed. Vitals were taken every 5 minutes and remained stable during procedure (see doc flow sheet for results). CO2 waveform capnography was monitored and remained WNL throughout procedure. Manual pressure was used to achieve hemostasis. A gauze/teg dressing was placed over surgical site. Report called to Aaron MONTENEGRO. Pt transported via gurney with RN to S520 in a stable condition.    Hemostasis achieved at 1206

## 2022-06-17 NOTE — PROGRESS NOTES
Hospital Medicine Daily Progress Note    Date of Service  6/17/2022    Chief Complaint  Jama Altman is a 66 y.o. male admitted 5/24/2022 with AMS and fall    Hospital Course  66 y.o. male w/ HTN, DLD, diabetes,  polycystic kidney dz with a renal transplant 9/10/10 and on immunosuppression but has CKD, ADELFO who presented 5/24/2022 with altered mentation.  There was initial concern that he had fallen at home and struck his head.  In the ER he was in atrial fibrillation with a rapid rate of 120-160.  He was given a fluid bolus and initiated on norepinephrine.  He was admitted for severe septic shock likely related to a soft tissue infection of the right leg from where he bumped into something several days ago. During admit he was found to have E.coli bacteremia and right leg cellulitis.  Spinal fluid ws negative for meningitis.  On 5/26 the patient had respiratory distress and was intubated. On 5/28 the patient had PEA arrest and had CPR.  A right knee aspiration was performed and fluid analysis was not showing infection.  On 6/1 the patient was extubated.  The patient had CRRT initiated on 5/27 and daily HD on 5/28 per nephrology notes. Patient shows sign of recovery and has been off dialysis for a few days.  E. coli bacteremia, source likely RLE cellulitis. ID consulted.  Completed iv meropenem with stop date 6/6/2022.     Interval Problem Update  Patient was seen and examined at bedside.  Denies nausea vomiting or pain.    Family meeting was held at bedside per the son's request.  All questions were answered.  Care plan was discussed and updated with the plan son.  He is agreeable.     Wound care recommended plastic surgeon consult.I talked to Dr. Hercules, who requests vascular surgery evaluation prior to any surgery.     Vascular surgery consulted, Dr. Lopez talked to the family, plan to have angiogram.     Patient reported diarrhea with stool softener.  Change Senokot to as needed    Creatinine is slightly up  today, gentle IV fluid, baseline at 1.5s    Respiratory distress resolved, off oxygen    PT/OT recommend postacute  Not a candidate for inpatient rehab   Pending SNF    Consultants/Specialty  Infectious disease  Critical care medicine  Nephrology    Code Status  Full Code    Disposition  SNF    Review of Systems  All systems reviewed and negative except as noted per above.    Physical Exam  Temp:  [36.4 °C (97.5 °F)-36.9 °C (98.5 °F)] 36.4 °C (97.5 °F)  Pulse:  [] 87  Resp:  [15-22] 16  BP: ()/(53-77) 103/53  SpO2:  [92 %-98 %] 95 %    General appearance: NAD, conversant, family at bedside  Eyes: anicteric sclerae, moist conjunctivae; no lid-lag; PERRLA, noted subconjunctival hemorrhage, pronounce on R eye   HENT: Atraumatic; oropharynx clear with moist mucous membranes and no mucosal ulcerations; normal hard and soft palate  Neck: Trachea midline; FROM, supple, no thyromegaly or lymphadenopathy  Lungs: CTA, with normal respiratory effort and no intercostal retractions  CV: RRR, no MRGs  Abdomen: Soft, non-tender; no masses or HSM  Extremities: RLE erythema with clean dressing  Skin: Normal temperature, turgor and texture; no rash, ulcers or subcutaneous nodules  Psych: Answering questions      Current Facility-Administered Medications:   •  NS infusion, , Intravenous, Continuous, Suzi Gandara M.D., Last Rate: 75 mL/hr at 06/17/22 0945, New Bag at 06/17/22 0945  •  senna-docusate (PERICOLACE or SENOKOT S) 8.6-50 MG per tablet 2 Tablet, 2 Tablet, Oral, BID PRN **AND** polyethylene glycol/lytes (MIRALAX) PACKET 1 Packet, 1 Packet, Oral, QDAY PRN **AND** [DISCONTINUED] magnesium hydroxide (MILK OF MAGNESIA) suspension 30 mL, 30 mL, Oral, QDAY PRN **AND** bisacodyl (DULCOLAX) suppository 10 mg, 10 mg, Rectal, QDAY PRN, Suzi Gandara M.D.  •  insulin GLARGINE (Lantus,Semglee) injection, 15 Units, Subcutaneous, BID, Timo Muñoz M.D., 15 Units at 06/16/22 1800  •  metoprolol tartrate (LOPRESSOR) tablet 12.5 mg, 12.5  mg, Oral, TWICE DAILY, Timo Muñoz M.D., 12.5 mg at 06/17/22 0549  •  patiromer (VELTASSA) powder for oral suspension 8.4 g, 8.4 g, Oral, DAILY, Roderick Ramirez M.D., 8.4 g at 06/16/22 1412  •  insulin regular (HumuLIN R,NovoLIN R) injection, 3-14 Units, Subcutaneous, 4X/DAY ACHS, 3 Units at 06/16/22 1210 **AND** POC blood glucose manual result, , , Q AC AND BEDTIME(S) **AND** NOTIFY MD and PharmD, , , Once **AND** Administer 20 grams of glucose (approximately 8 ounces of fruit juice) every 15 minutes PRN FSBG less than 70 mg/dL, , , PRN **AND** dextrose 50% (D50W) injection 25 g, 25 g, Intravenous, Q15 MIN PRN, Linsey M Wegener, A.P.R.N.  •  acetaminophen (Tylenol) tablet 650 mg, 650 mg, Oral, Q6HRS PRN, Mehrdad Laughlin M.D., 650 mg at 06/16/22 2129  •  tacrolimus (PROGRAF) capsule 1 mg, 1 mg, Oral, BID, Mehrdad Laughlin M.D., 1 mg at 06/17/22 0534  •  mycophenolate (CELLCEPT) capsule 500 mg, 500 mg, Oral, BID, Mehrdad Laughlin M.D., 500 mg at 06/17/22 0534  •  oxyCODONE immediate-release (ROXICODONE) tablet 5 mg, 5 mg, Oral, Q4HRS PRN, Mehrdad Laughlin M.D.  •  predniSONE (DELTASONE) tablet 5 mg, 5 mg, Oral, DAILY, Mehrdad Laughlin M.D., 5 mg at 06/17/22 0534  •  heparin injection 5,000 Units, 5,000 Units, Subcutaneous, Q12HRS, James Titus M.D., 5,000 Units at 06/16/22 1740  •  heparin injection 2,800 Units, 2,800 Units, Intracatheter, DIALYSIS PRN, James Sheppard M.D., 2,800 Units at 06/02/22 1400  •  Respiratory Therapy Consult, , Nebulization, Continuous RT, Reuben Swartz M.D.  •  sodium chloride (OCEAN) 0.65 % nasal spray 2 Spray, 2 Spray, Nasal, Q2HRS PRN, Kayy Hopkins M.D., 2 [place] at 06/14/22 1742  •  dakins 0.125% (1/4 strength) topical soln, , Topical, BID, Suzi Gandara M.D., 473 mL at 06/17/22 0200  •  HYDROmorphone (Dilaudid) injection 0.5 mg, 0.5 mg, Intravenous, Q2HRS PRN, Tracey Whitfield M.D., 0.5 mg at 05/31/22 0059      Fluids    Intake/Output Summary (Last 24 hours) at 6/17/2022 1523  Last data  filed at 6/17/2022 1304  Gross per 24 hour   Intake 0 ml   Output 600 ml   Net -600 ml       Laboratory      Recent Labs     06/15/22  0825 06/16/22  0141 06/17/22  0308   SODIUM 136 134* 136   POTASSIUM 5.3 5.0 5.1   CHLORIDE 101 102 102   CO2 26 24 23   GLUCOSE 82 146* 112*   BUN 31* 33* 33*   CREATININE 1.39 1.52* 1.73*   CALCIUM 8.4* 8.0* 8.6                   Imaging  DX-CHEST-PORTABLE (1 VIEW)   Final Result      1.  Interval improvement in bilateral pulmonary opacities, likely improving edema.   2.  Bilateral infrahilar atelectasis versus consolidations persist. No effusions.         CT-HEAD W/O   Final Result      1.  Cerebral atrophy.      2.  Bilateral mastoid effusions.      3.  Otherwise, Head CT without contrast within normal limits. No evidence of acute intracranial hemorrhage or mass lesion.         DX-ABDOMEN FOR TUBE PLACEMENT   Final Result      Enteric feeding tube terminates with the tip projecting over the expected location of the 2nd-3rd portion of the duodenum.      DX-CHEST-LIMITED (1 VIEW)   Final Result      1.  Bibasilar underinflation atelectasis which could obscure an additional process. This is unchanged.   2.  Interstitial opacities likely pulmonary edema   3.  Persistently enlarged cardiac silhouette      DX-CHEST-LIMITED (1 VIEW)   Final Result      1.  Hypoinflation with mildly increased left basilar atelectasis.   2.  Removal of endotracheal tube.      DX-CHEST-LIMITED (1 VIEW)   Final Result         No significant interval change.      DX-CHEST-LIMITED (1 VIEW)   Final Result      Stable areas of patchy atelectasis/consolidation.      DX-CHEST-PORTABLE (1 VIEW)   Final Result      Stable chest x-ray findings.      DX-CHEST-PORTABLE (1 VIEW)   Final Result         1.  Pulmonary edema and/or infiltrates, somewhat increased particularly in the right lung base compared to prior study.   2.  Cardiomegaly      CT-ABDOMEN-PELVIS W/O   Final Result         Limited noncontrast exam      1.  Long segment wall thickening from the descending colon to the sigmoid colon could relate to underdistention or colitis. Air-fluid level in the more proximal colon is likely diarrheal disease.      2. Right lower quadrant transplant kidney with retained prior contrast, likely secondary to renal failure/ATN/contrast nephropathy. No hydronephrosis.      Absent right kidney. Polycystic left kidney.      CT-EXTREMITY, LOWER W/O RIGHT   Final Result      1. Postsurgical changes of right total knee arthroplasty.   2. Right knee joint effusion with thickening of the joint capsule and surrounding soft tissue edema.   3. Circumferential edema involving the soft tissues of the right lower leg with an anterior soft tissue defect and with areas of clustering on the medial right lower leg skin surface.   4. Arteriosclerosis.      DX-CHEST-PORTABLE (1 VIEW)   Final Result      1.  Unchanged position of supporting devices are visualized extent   2.  No visible pneumothorax following chest compressions   3.  Unchanged atelectasis and possible superimposed interstitial pulmonary edema or atypical pneumonia      US-EXTREMITY ARTERY LOWER UNILAT RIGHT   Final Result      US-MIRELLA SINGLE LEVEL BILAT   Final Result      US-EXTREMITY ARTERY LOWER BILAT W/MIRELLA (COMBO)   Final Result      DX-CHEST-PORTABLE (1 VIEW)   Final Result         1.  Pulmonary edema and/or infiltrates, somewhat increased particularly in the right lung base compared to prior study.   2.  Cardiomegaly      DX-ABDOMEN FOR TUBE PLACEMENT   Final Result      1. The tip of the feeding tube terminates over the junction of the gastric pylorus and duodenal bulb.   2. The remainder is stable.      DX-CHEST-PORTABLE (1 VIEW)   Final Result      1. Interval decrease in size of the right pleural effusion.   2. No left pleural effusion.   3. No visible pneumothorax status post bronchoscopy.   4. Interval placement of a Dobbhoff feeding tube.   5. Improving parenchymal loss in the  right lung base, incompletely resolved.   6. The remainder is stable.      DX-CHEST-PORTABLE (1 VIEW)   Final Result      1. Interval development of a moderate right pleural effusion after placement of a left internal jugular dialysis catheter, abutting the right lateral wall of the right atrium. Verification of line position with contrast injection under fluoroscopy is    offered.   2. Improved perihilar atelectasis.   3. The remainder is stable.      I, Dr. Matthew Brown, discussed the results of this examination directly by phone with Dr. Reuben Swartz on 5/26/2022 at 0855 hours.      EC-ECHOCARDIOGRAM COMPLETE W/ CONT   Final Result      DX-CHEST-LIMITED (1 VIEW)   Final Result         1. Right internal jugular central venous access catheter placed in the interval terminates over the upper aspect of the right atrium. No postprocedure visible pneumothorax.   2. Stable patchy parenchymal opacities in the lungs, with a stable small left pleural effusion.      DX-CHEST-PORTABLE (1 VIEW)   Final Result         1.  Pulmonary edema and/or infiltrates are identified, which are stable since the prior exam.   2.  Trace bilateral pleural effusions   3.  Cardiomegaly      CT-EXTREMITY, LOWER W/O RIGHT   Final Result      1.  Status post right total knee replacement.      2.  Diffuse atherosclerotic calcification of the arterial system of the right lower extremity suspicious for diabetes mellitus.      3.  Soft tissue attenuation in the subcutaneous tissues of the mid to distal lower leg and foot suspicious for cellulitis.      4.  No evidence of soft tissue ulceration in the right lower leg or foot.      5.  No evidence of acute osteomyelitis in the right lower leg or foot.      6.  Small subcutaneous abscesses cannot be excluded without the use of intravenous contrast.      DX-CHEST-PORTABLE (1 VIEW)   Final Result      Left internal jugular central venous access catheter terminates over a persistent left superior  vena cava. No postprocedure visible pneumothorax.      The remainder is stable.      DX-TIBIA AND FIBULA RIGHT   Final Result      1. Right lower leg soft tissue swelling.   2. No acute fracture or subluxation.   3. Postsurgical changes of right total knee arthroplasty.      US-ABDOMEN F.A.S.T. LTD (FOR ED USE ONLY)   Final Result      No free fluid seen in all 4 quadrants.      Negative FAST scan.            CT-ABDOMEN-PELVIS WITH   Final Result      1. No acute posttraumatic findings in the abdomen or pelvis.   2. Bibasilar subsegmental atelectasis.   3. Right pelvic transplant kidney without hydronephrosis.   4. Polycystic left kidney.   5. Diverticulosis without diverticulitis. Normal appendix.      CT-CTA CHEST PULMONARY ARTERY W/ RECONS   Final Result      1.  No CT evidence of pulmonary emboli.      2.  Bibasilar atelectasis.      3.  No evidence of rib fracture.      4. Diffuse coronary artery calcification.      CT-HEAD W/O   Final Result      1.  Cerebral atrophy.      2.  Otherwise, Head CT without contrast within normal limits. No evidence of acute cerebral infarction, hemorrhage or mass lesion.         CT-CSPINE WITHOUT PLUS RECONS   Final Result      1.  Moderate osteoarthritic changes at the C6-7 level with disc space narrowing and marginal osteophytosis. Further there is moderate cervical spondylotic changes at this level.      2.  No evidence of cervical spine fracture and/or subluxation.      DX-PELVIS-1 OR 2 VIEWS   Final Result      1.  Unremarkable single AP view of the pelvis.      DX-CHEST-LIMITED (1 VIEW)   Final Result      1.  Curvilinear perihilar opacities likely atelectasis and/or parenchymal scarring.      2.  Mild cardiomegaly.      IR-EXTREMITY ANGIOGRAM-UNILATERAL RIGHT    (Results Pending)        Assessment/Plan  * Acute renal failure superimposed on stage 3a chronic kidney disease (HCC)  Assessment & Plan  Nephrology consulting  Hemodialysis as per nephrology. Has been off HD as  renal function improving, dialysis catheter has been removed  On IV fluid  Monitor vitals, I/O's, labs  Avoid nephrotoxins.  Immunosuppression for hx of renal transplant    Improving    Non-healing wound of right lower extremity  Assessment & Plan  RLE Arterial ultrasound 5/2022 showed mild arterial insufficiency, no significant stenosis or occlusion   S/p debridement 5/24, 6/16 by wound care  Wound care following, recommended plastic surgeon consult  I talked to plastic surgeon , vascular surgery evaluation prior to any surgery.     Vascular surgery consulted, Dr. Lopez plan to have angiogram.                 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    Bacteremia due to Escherichia coli- (present on admission)  Assessment & Plan  Blood culture pos E coli on 5/24. Repeated blood culture negative to date. Source likely sec to RLE cellulitis  Completed 10 days course of IV meropenem (started on 5/28 given new fever and worsening chest x-ray) on 6/6  ID consulted     Acute respiratory failure with hypoxia (HCC)  Assessment & Plan  Intubated 5/26 and extubated 6/1  Mobilize as able  Respiratory protocol  Supplemental oxygen and wean as able      Knee effusion, right  Assessment & Plan  s/p bedside right knee joint aspiration on 5/28.  Synovial fluid analysis reveals hazy priscila fluid, 2638 WBCs with PMN predominance and no crystals seen.  Fluid not consistent with infection.  Culture neg    Cardiac arrest (HCC)  Assessment & Plan  S/p PEA  Monitor labs, vitals.    Acute metabolic encephalopathy  Assessment & Plan  Improving.  Monitor neurochecks  Treating infection as likely  etiology    Resolved    Acute on chronic systolic heart failure (HCC)- (present on admission)  Assessment & Plan  With most recent ejection fraction 35%  Be conservative with IV fluid  On beta-blocker, ACEI on hold due to SANKET    Continue to monitor      PAF (paroxysmal atrial fibrillation) (HCC)- (present on admission)  Assessment & Plan  Hx of pAFib , not on OAC at home.   Outpatient metoprolol 25mg daily on hold due to septic shock and borderline hypotension.  Resumed metoprolol 12.5 mg twice daily on 6/10 with holding parameters  Outpatient cardiology/PCP follow up regards OAC use.     Type 2 diabetes mellitus with hyperlipidemia (HCC)- (present on admission)  Assessment & Plan  On lantus and SSI. Holding home po diabetes meds  Hypoglycemic protocol   A1c 10.7    Thrombocytopenia (HCC)- (present on admission)  Assessment & Plan  Resolved     Hyperkalemia  Assessment & Plan  Low potassium diet  On [place]  Continue to monitor    Resolved    Septic shock (HCC)- (present on admission)  Assessment & Plan  Sec to E.coli bacteremia from RLE cellulitis  Completed abx   Resolved       VTE prophylaxis: heparin ppx    I have performed a physical exam and reviewed and updated ROS and Plan today (6/17/2022). In review of yesterday's note (6/16/2022), there are no changes except as documented above  Hospital Medicine Daily Progress Note    Date of Service  6/17/2022    Chief Complaint  Jama Altman is a 66 y.o. male admitted 5/24/2022 with AMS and fall    Hospital Course  66 y.o. male w/ HTN, DLD, diabetes,  polycystic kidney dz with a renal transplant 9/10/10 and on immunosuppression but has CKD, ADELFO who presented 5/24/2022 with altered mentation.  There was initial concern that he had fallen at home and struck his head.  In the ER he was in atrial fibrillation with a rapid rate of 120-160.  He was given a fluid bolus and initiated on norepinephrine.  He was admitted for severe septic shock likely related to a soft  tissue infection of the right leg from where he bumped into something several days ago. During admit he was found to have E.coli bacteremia and right leg cellulitis.  Spinal fluid ws negative for meningitis.  On 5/26 the patient had respiratory distress and was intubated. On 5/28 the patient had PEA arrest and had CPR.  A right knee aspiration was performed and fluid analysis was not showing infection.  On 6/1 the patient was extubated.  The patient had CRRT initiated on 5/27 and daily HD on 5/28 per nephrology notes. Patient shows sign of recovery and has been off dialysis for a few days.  E. coli bacteremia, source likely RLE cellulitis. ID consulted.  Completed iv meropenem with stop date 6/6/2022.     Interval Problem Update  Patient was seen and examined at bedside.  Denies nausea vomiting or pain.     Pending wound care recommendation    Patient reported diarrhea with stool softener.  Change Senokot to as needed    SANKET resolved, baseline at 1.5s, off IV fluid  Respiratory distress resolved, off oxygen    PT/OT recommend postacute  Not a candidate for inpatient rehab   Pending SNF    Consultants/Specialty  Infectious disease  Critical care medicine  Nephrology    Code Status  Full Code    Disposition  [facility]    Review of Systems  All systems reviewed and negative except as noted per above.    Physical Exam  Temp:  [36.4 °C (97.5 °F)-36.9 °C (98.5 °F)] 36.4 °C (97.5 °F)  Pulse:  [] 87  Resp:  [15-22] 16  BP: ()/(53-77) 103/53  SpO2:  [92 %-98 %] 95 %    General appearance: NAD, conversant, family at bedside  Eyes: anicteric sclerae, moist conjunctivae; no lid-lag; PERRLA, noted subconjunctival hemorrhage, pronounce on R eye   HENT: Atraumatic; oropharynx clear with moist mucous membranes and no mucosal ulcerations; normal hard and soft palate  Neck: Trachea midline; FROM, supple, no thyromegaly or lymphadenopathy  Lungs: CTA, with normal respiratory effort and no intercostal retractions  CV: RRR, no  MRGs  Abdomen: Soft, non-tender; no masses or HSM  Extremities: RLE erythema with clean dressing  Skin: Normal temperature, turgor and texture; no rash, ulcers or subcutaneous nodules  Psych: Answering questions      Current Facility-Administered Medications:   •  NS infusion, , Intravenous, Continuous, Suiz Gandara M.D., Last Rate: 75 mL/hr at 06/17/22 0945, New Bag at 06/17/22 0945  •  senna-docusate (PERICOLACE or SENOKOT S) 8.6-50 MG per tablet 2 Tablet, 2 Tablet, Oral, BID PRN **AND** polyethylene glycol/lytes (MIRALAX) PACKET 1 Packet, 1 Packet, Oral, QDAY PRN **AND** [DISCONTINUED] magnesium hydroxide (MILK OF MAGNESIA) suspension 30 mL, 30 mL, Oral, QDAY PRN **AND** bisacodyl (DULCOLAX) suppository 10 mg, 10 mg, Rectal, QDAY PRN, Suzi Gandara M.D.  •  insulin GLARGINE (Lantus,Semglee) injection, 15 Units, Subcutaneous, BID, Timo Muñoz M.D., 15 Units at 06/16/22 1800  •  metoprolol tartrate (LOPRESSOR) tablet 12.5 mg, 12.5 mg, Oral, TWICE DAILY, Timo Muñoz M.D., 12.5 mg at 06/17/22 0549  •  patiromer (VELTASSA) powder for oral suspension 8.4 g, 8.4 g, Oral, DAILY, Roderick Ramirez M.D., 8.4 g at 06/16/22 1412  •  insulin regular (HumuLIN R,NovoLIN R) injection, 3-14 Units, Subcutaneous, 4X/DAY ACHS, 3 Units at 06/16/22 1210 **AND** POC blood glucose manual result, , , Q AC AND BEDTIME(S) **AND** NOTIFY MD and PharmD, , , Once **AND** Administer 20 grams of glucose (approximately 8 ounces of fruit juice) every 15 minutes PRN FSBG less than 70 mg/dL, , , PRN **AND** dextrose 50% (D50W) injection 25 g, 25 g, Intravenous, Q15 MIN PRN, Linsey M Wegener, A.P.RRudiN.  •  acetaminophen (Tylenol) tablet 650 mg, 650 mg, Oral, Q6HRS PRN, Mehrdad Laughlin M.D., 650 mg at 06/16/22 2129  •  tacrolimus (PROGRAF) capsule 1 mg, 1 mg, Oral, BID, Mehrdad Laughlin M.D., 1 mg at 06/17/22 0534  •  mycophenolate (CELLCEPT) capsule 500 mg, 500 mg, Oral, BID, Mehrdad Laughlin M.D., 500 mg at 06/17/22 0534  •  oxyCODONE immediate-release  (ROXICODONE) tablet 5 mg, 5 mg, Oral, Q4HRS PRN, Mehrdad Laughlin M.D.  •  predniSONE (DELTASONE) tablet 5 mg, 5 mg, Oral, DAILY, Mehrdad Laughlin M.D., 5 mg at 06/17/22 0534  •  heparin injection 5,000 Units, 5,000 Units, Subcutaneous, Q12HRS, James Titus M.D., 5,000 Units at 06/16/22 1740  •  heparin injection 2,800 Units, 2,800 Units, Intracatheter, DIALYSIS PRN, James Sheppard M.D., 2,800 Units at 06/02/22 1400  •  Respiratory Therapy Consult, , Nebulization, Continuous RT, Reuben Swartz M.D.  •  sodium chloride (OCEAN) 0.65 % nasal spray 2 Spray, 2 Spray, Nasal, Q2HRS PRN, Kayy Hopkins M.D., 2 [place] at 06/14/22 1742  •  dakins 0.125% (1/4 strength) topical soln, , Topical, BID, Suzi Gandara M.D., 473 mL at 06/17/22 0200  •  HYDROmorphone (Dilaudid) injection 0.5 mg, 0.5 mg, Intravenous, Q2HRS PRN, Tracey Whitfield M.D., 0.5 mg at 05/31/22 0059      Fluids    Intake/Output Summary (Last 24 hours) at 6/17/2022 1523  Last data filed at 6/17/2022 1304  Gross per 24 hour   Intake 0 ml   Output 600 ml   Net -600 ml       Laboratory      Recent Labs     06/15/22  0825 06/16/22  0141 06/17/22  0308   SODIUM 136 134* 136   POTASSIUM 5.3 5.0 5.1   CHLORIDE 101 102 102   CO2 26 24 23   GLUCOSE 82 146* 112*   BUN 31* 33* 33*   CREATININE 1.39 1.52* 1.73*   CALCIUM 8.4* 8.0* 8.6                   Imaging  DX-CHEST-PORTABLE (1 VIEW)   Final Result      1.  Interval improvement in bilateral pulmonary opacities, likely improving edema.   2.  Bilateral infrahilar atelectasis versus consolidations persist. No effusions.         CT-HEAD W/O   Final Result      1.  Cerebral atrophy.      2.  Bilateral mastoid effusions.      3.  Otherwise, Head CT without contrast within normal limits. No evidence of acute intracranial hemorrhage or mass lesion.         DX-ABDOMEN FOR TUBE PLACEMENT   Final Result      Enteric feeding tube terminates with the tip projecting over the expected location of the 2nd-3rd portion of the duodenum.       DX-CHEST-LIMITED (1 VIEW)   Final Result      1.  Bibasilar underinflation atelectasis which could obscure an additional process. This is unchanged.   2.  Interstitial opacities likely pulmonary edema   3.  Persistently enlarged cardiac silhouette      DX-CHEST-LIMITED (1 VIEW)   Final Result      1.  Hypoinflation with mildly increased left basilar atelectasis.   2.  Removal of endotracheal tube.      DX-CHEST-LIMITED (1 VIEW)   Final Result         No significant interval change.      DX-CHEST-LIMITED (1 VIEW)   Final Result      Stable areas of patchy atelectasis/consolidation.      DX-CHEST-PORTABLE (1 VIEW)   Final Result      Stable chest x-ray findings.      DX-CHEST-PORTABLE (1 VIEW)   Final Result         1.  Pulmonary edema and/or infiltrates, somewhat increased particularly in the right lung base compared to prior study.   2.  Cardiomegaly      CT-ABDOMEN-PELVIS W/O   Final Result         Limited noncontrast exam      1. Long segment wall thickening from the descending colon to the sigmoid colon could relate to underdistention or colitis. Air-fluid level in the more proximal colon is likely diarrheal disease.      2. Right lower quadrant transplant kidney with retained prior contrast, likely secondary to renal failure/ATN/contrast nephropathy. No hydronephrosis.      Absent right kidney. Polycystic left kidney.      CT-EXTREMITY, LOWER W/O RIGHT   Final Result      1. Postsurgical changes of right total knee arthroplasty.   2. Right knee joint effusion with thickening of the joint capsule and surrounding soft tissue edema.   3. Circumferential edema involving the soft tissues of the right lower leg with an anterior soft tissue defect and with areas of clustering on the medial right lower leg skin surface.   4. Arteriosclerosis.      DX-CHEST-PORTABLE (1 VIEW)   Final Result      1.  Unchanged position of supporting devices are visualized extent   2.  No visible pneumothorax following chest compressions    3.  Unchanged atelectasis and possible superimposed interstitial pulmonary edema or atypical pneumonia      US-EXTREMITY ARTERY LOWER UNILAT RIGHT   Final Result      US-MIRELLA SINGLE LEVEL BILAT   Final Result      US-EXTREMITY ARTERY LOWER BILAT W/MIRELLA (COMBO)   Final Result      DX-CHEST-PORTABLE (1 VIEW)   Final Result         1.  Pulmonary edema and/or infiltrates, somewhat increased particularly in the right lung base compared to prior study.   2.  Cardiomegaly      DX-ABDOMEN FOR TUBE PLACEMENT   Final Result      1. The tip of the feeding tube terminates over the junction of the gastric pylorus and duodenal bulb.   2. The remainder is stable.      DX-CHEST-PORTABLE (1 VIEW)   Final Result      1. Interval decrease in size of the right pleural effusion.   2. No left pleural effusion.   3. No visible pneumothorax status post bronchoscopy.   4. Interval placement of a Dobbhoff feeding tube.   5. Improving parenchymal loss in the right lung base, incompletely resolved.   6. The remainder is stable.      DX-CHEST-PORTABLE (1 VIEW)   Final Result      1. Interval development of a moderate right pleural effusion after placement of a left internal jugular dialysis catheter, abutting the right lateral wall of the right atrium. Verification of line position with contrast injection under fluoroscopy is    offered.   2. Improved perihilar atelectasis.   3. The remainder is stable.      I, Dr. Matthew Brown, discussed the results of this examination directly by phone with Dr. Reuben Swartz on 5/26/2022 at 0855 hours.      EC-ECHOCARDIOGRAM COMPLETE W/ CONT   Final Result      DX-CHEST-LIMITED (1 VIEW)   Final Result         1. Right internal jugular central venous access catheter placed in the interval terminates over the upper aspect of the right atrium. No postprocedure visible pneumothorax.   2. Stable patchy parenchymal opacities in the lungs, with a stable small left pleural effusion.      DX-CHEST-PORTABLE (1  VIEW)   Final Result         1.  Pulmonary edema and/or infiltrates are identified, which are stable since the prior exam.   2.  Trace bilateral pleural effusions   3.  Cardiomegaly      CT-EXTREMITY, LOWER W/O RIGHT   Final Result      1.  Status post right total knee replacement.      2.  Diffuse atherosclerotic calcification of the arterial system of the right lower extremity suspicious for diabetes mellitus.      3.  Soft tissue attenuation in the subcutaneous tissues of the mid to distal lower leg and foot suspicious for cellulitis.      4.  No evidence of soft tissue ulceration in the right lower leg or foot.      5.  No evidence of acute osteomyelitis in the right lower leg or foot.      6.  Small subcutaneous abscesses cannot be excluded without the use of intravenous contrast.      DX-CHEST-PORTABLE (1 VIEW)   Final Result      Left internal jugular central venous access catheter terminates over a persistent left superior vena cava. No postprocedure visible pneumothorax.      The remainder is stable.      DX-TIBIA AND FIBULA RIGHT   Final Result      1. Right lower leg soft tissue swelling.   2. No acute fracture or subluxation.   3. Postsurgical changes of right total knee arthroplasty.      US-ABDOMEN F.A.S.T. LTD (FOR ED USE ONLY)   Final Result      No free fluid seen in all 4 quadrants.      Negative FAST scan.            CT-ABDOMEN-PELVIS WITH   Final Result      1. No acute posttraumatic findings in the abdomen or pelvis.   2. Bibasilar subsegmental atelectasis.   3. Right pelvic transplant kidney without hydronephrosis.   4. Polycystic left kidney.   5. Diverticulosis without diverticulitis. Normal appendix.      CT-CTA CHEST PULMONARY ARTERY W/ RECONS   Final Result      1.  No CT evidence of pulmonary emboli.      2.  Bibasilar atelectasis.      3.  No evidence of rib fracture.      4. Diffuse coronary artery calcification.      CT-HEAD W/O   Final Result      1.  Cerebral atrophy.      2.   Otherwise, Head CT without contrast within normal limits. No evidence of acute cerebral infarction, hemorrhage or mass lesion.         CT-CSPINE WITHOUT PLUS RECONS   Final Result      1.  Moderate osteoarthritic changes at the C6-7 level with disc space narrowing and marginal osteophytosis. Further there is moderate cervical spondylotic changes at this level.      2.  No evidence of cervical spine fracture and/or subluxation.      DX-PELVIS-1 OR 2 VIEWS   Final Result      1.  Unremarkable single AP view of the pelvis.      DX-CHEST-LIMITED (1 VIEW)   Final Result      1.  Curvilinear perihilar opacities likely atelectasis and/or parenchymal scarring.      2.  Mild cardiomegaly.      IR-EXTREMITY ANGIOGRAM-UNILATERAL RIGHT    (Results Pending)        Assessment/Plan  * Acute renal failure superimposed on stage 3a chronic kidney disease (HCC)  Assessment & Plan  Nephrology consulting  Hemodialysis as per nephrology. Has been off HD as renal function improving, dialysis catheter has been removed  On IV fluid  Monitor vitals, I/O's, labs  Avoid nephrotoxins.  Immunosuppression for hx of renal transplant    Improving    Non-healing wound of right lower extremity  Assessment & Plan  RLE Arterial ultrasound 5/2022 showed mild arterial insufficiency, no significant stenosis or occlusion   S/p debridement 5/24, 6/16 by wound care  Wound care following, recommended plastic surgeon consult  I talked to plastic surgeon , vascular surgery evaluation prior to any surgery.     Vascular surgery consulted, Dr. Lopez plan to have angiogram.                 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    Bacteremia due to Escherichia coli- (present on admission)  Assessment & Plan  Blood culture pos E coli on 5/24. Repeated blood culture negative to date. Source likely sec to RLE cellulitis  Completed 10 days course of IV meropenem (started on 5/28 given new fever and worsening chest x-ray) on 6/6  ID consulted     Acute respiratory failure with hypoxia (HCC)  Assessment & Plan  Intubated 5/26 and extubated 6/1  Mobilize as able  Respiratory protocol  Supplemental oxygen and wean as able      Knee effusion, right  Assessment & Plan  s/p bedside right knee joint aspiration on 5/28.  Synovial fluid analysis reveals hazy priscila fluid, 2638 WBCs with PMN predominance and no crystals seen.  Fluid not consistent with infection.  Culture neg    Cardiac arrest ([facility])  Assessment & Plan  S/p PEA  Monitor labs, vitals.    Acute metabolic encephalopathy  Assessment & Plan  Improving.  Monitor neurochecks  Treating infection as likely etiology    Resolved    Acute on chronic systolic heart failure (HCC)- (present on admission)  Assessment & Plan  With most recent ejection fraction 35%  Be conservative with IV fluid  On beta-blocker, ACEI on hold due to SANKET    Continue to monitor      PAF (paroxysmal atrial fibrillation) ([facility])- (present on admission)  Assessment & Plan  Hx of pAFib , not on OAC at home.   Outpatient metoprolol 25mg daily on hold due to septic shock and borderline hypotension.  Resumed metoprolol 12.5 mg twice daily on 6/10 with holding parameters  Outpatient cardiology/PCP follow up regards OAC use.     Type 2 diabetes mellitus with hyperlipidemia (HCC)- (present on admission)  Assessment & Plan  On lantus and SSI. Holding home po diabetes meds  Hypoglycemic protocol   A1c 10.7    Thrombocytopenia (HCC)- (present on admission)  Assessment & Plan  Resolved     Hyperkalemia  Assessment & Plan  Low potassium diet  On V[place]  Continue to monitor    Resolved    Septic shock (HCC)- (present on  admission)  Assessment & Plan  Sec to E.coli bacteremia from RLE cellulitis  Completed abx   Resolved       VTE prophylaxis: heparin ppx    I have performed a physical exam and reviewed and updated ROS and Plan today (6/17/2022). In review of yesterday's note (6/16/2022), there are no changes except as documented above

## 2022-06-17 NOTE — CONSULTS
ACUTE CARE VASCULAR SERVICE  CONSULT NOTE      Date: 6/17/2022    Referring Provider: Suzi Gandara M.d.    Consulting Physician: Peter Lopez M.D. [facility]    -------------------------------------------------------------------------------------------------    Reason for consultation:  PAOD and RLE wounds    HPI:  This is a 66 y.o. male who has longstanding wounds of the right lower extremity.  These have been difficult to heal even with maximal local care.  The wounds involve the lower leg particularly the anterior and medial aspect and also the right foot.  Arterial work-up was performed showing atherosclerotic disease and some degree of arterial insufficiency.  I was consulted for vascular recommendations.  Reportedly the plastic surgery team was also notified and they have requested an angiogram prior to any surgical intervention on the leg.  When I came to see the patient he was alert and conversant in no distress.  No specific complaints at this time.    Past Medical History:   Diagnosis Date   • Benign essential hypertension    • Hyperlipoproteinemia    • Hypertension     not on meds anymore   • Pain    • Polycystic kidney 9/10/10    RIGHT KIDNEY TRANSPLANT   • Sleep apnea    • Snoring        Past Surgical History:   Procedure Laterality Date   • KNEE MANIPULATION  2/16/2012    Performed by LATOYA CONNER at SURGERY [facility]   • KNEE UNICOMPARTMENTAL  12/23/2011    Performed by LATOYA CONENR at SURGERY [facility]   • KNEE ARTHROSCOPY  12/23/2011    Performed by LATOYA CONNER at SURGERY [facility]   • KNEE ARTHROSCOPY  5/3/2011    Performed by HANANE GOLDMAN at SURGERY SAME DAY [facility] ORS   • MENISCECTOMY, KNEE, MEDIAL  5/3/2011    Performed by HANANE GOLDMAN at SURGERY SAME DAY [facility] ORS   • OTHER  9/10/10    RIGHT KIDNEY TRANSPLANT   • KNEE ARTHROPLASTY TOTAL  1/12/07    RIGHT   • KNEE ARTHROSCOPY  4/10/06    RIGHT   • OTHER ORTHOPEDIC SURGERY  7/8/74    LEFT KNEE  DEBRIDEMENT       Current Facility-Administered Medications   Medication Dose Route Frequency Provider Last Rate Last Admin   • NS infusion   Intravenous Continuous Suzi Gandara M.D. 75 mL/hr at 06/17/22 0945 New Bag at 06/17/22 0945   • senna-docusate (PERICOLACE or SENOKOT S) 8.6-50 MG per tablet 2 Tablet  2 Tablet Oral BID PRN Suzi Gandara M.D.        And   • polyethylene glycol/lytes (MIRALAX) PACKET 1 Packet  1 Packet Oral QDAY PRN Suzi Gandara M.D.        And   • bisacodyl (DULCOLAX) suppository 10 mg  10 mg Rectal QDAY PRN Suzi Gandara M.D.       • insulin GLARGINE (Lantus,Semglee) injection  15 Units Subcutaneous BID iTmo Muñoz M.D.   15 Units at 06/16/22 1800   • metoprolol tartrate (LOPRESSOR) tablet 12.5 mg  12.5 mg Oral TWICE DAILY Timo Muñoz M.D.   12.5 mg at 06/17/22 0549   • patiromer (VELTASSA) powder for oral suspension 8.4 g  8.4 g Oral DAILY Roderick Ramirez M.D.   8.4 g at 06/16/22 1412   • insulin regular (HumuLIN R,NovoLIN R) injection  3-14 Units Subcutaneous 4X/DAY ACHS Linsey M Wegener, A.P.R.N.   3 Units at 06/16/22 1210    And   • dextrose 50% (D50W) injection 25 g  25 g Intravenous Q15 MIN PRN Linsey M Wegener, A.P.R.N.       • acetaminophen (Tylenol) tablet 650 mg  650 mg Oral Q6HRS PRN Mehrdad Laughlin M.D.   650 mg at 06/16/22 2129   • tacrolimus (PROGRAF) capsule 1 mg  1 mg Oral BID Mehrdad Laughlin M.D.   1 mg at 06/17/22 0534   • mycophenolate (CELLCEPT) capsule 500 mg  500 mg Oral BID Mehrdad Laughlin M.D.   500 mg at 06/17/22 0534   • oxyCODONE immediate-release (ROXICODONE) tablet 5 mg  5 mg Oral Q4HRS PRN Mehrdad M. Qian, M.D.       • predniSONE (DELTASONE) tablet 5 mg  5 mg Oral DAILY Mehrdad Laughlin M.D.   5 mg at 06/17/22 0534   • heparin injection 5,000 Units  5,000 Units Subcutaneous Q12HRS James Titus M.D.   5,000 Units at 06/16/22 1740   • heparin injection 2,800 Units  2,800 Units Intracatheter DIALYSIS PRN James Sheppard M.D.   2,800 Units at 06/02/22 1400   • Respiratory Therapy  Consult   Nebulization Continuous RT Reuben Swartz M.D.       • sodium chloride (OCEAN) 0.65 % nasal spray 2 Spray  2 Spray Nasal Q2HRS PRN Kayy Hopkins M.D.   2 [place] at 06/14/22 1742   • dakins 0.125% (1/4 strength) topical soln   Topical BID Suzi Gandara M.D.   473 mL at 06/17/22 0200   • HYDROmorphone (Dilaudid) injection 0.5 mg  0.5 mg Intravenous Q2HRS PRN Tracey Whitfield M.D.   0.5 mg at 05/31/22 0059       Social History     Socioeconomic History   • Marital status:      Spouse name: Not on file   • Number of children: Not on file   • Years of education: Not on file   • Highest education level: Not on file   Occupational History   • Not on file   Tobacco Use   • Smoking status: Never Smoker   • Smokeless tobacco: Never Used   Vaping Use   • Vaping Use: Never used   Substance and Sexual Activity   • Alcohol use: No   • Drug use: No   • Sexual activity: Yes     Partners: Female   Other Topics Concern   • Not on file   Social History Narrative   • Not on file     Social Determinants of Health     Financial Resource Strain: Not on file   Food Insecurity: Not on file   Transportation Needs: Not on file   Physical Activity: Not on file   Stress: Not on file   Social Connections: Not on file   Intimate Partner Violence: Not on file   Housing Stability: Not on file       History reviewed. No pertinent family history.    Allergies:  Doxycycline    Review of Systems:  Constitutional: Negative for fever, chills, weight loss,   HENT:   Negative for hearing loss or tinnitus    Eyes:    Negative for blurred vision, double vision, or loss of vision  Respiratory:  Negative for cough, hemoptysis, or wheezing    Cardiac:  Negative for chest pain or palpitations or orthopnea  Vascular:  Negative for claudication or rest pain   Gastrointestinal: Negative for nausea, vomiting, or abdominal pain     Negative for hematochezia or melena   Genitourinary: Negative for dysuria, frequency, or hematuria  "  Musculoskeletal: Negative for myalgias, back pain, or joint pain  Skin:   Negative for itching or rash  Neurological:  Negative for dizziness, headaches, or tremors     Negative for speech disturbance     Negative for extremity weakness or paresthesias  Endo/Heme:  Negative for easy bruising or bleeding  Psychiatric:  Negative for depression, suicidal ideas, or hallucinations    Physical Exam:  /68   Pulse 88   Temp 36.9 °C (98.5 °F) (Temporal)   Resp 15   Ht 1.956 m (6' 5\")   Wt 123 kg (271 lb 6.2 oz)   SpO2 93%     Constitutional: Alert, oriented, no acute distress  HEENT:  Normocephalic and atraumatic, EOMI  Neck:   Supple, no JVD,   Cardiovascular: Regular rate and rhythm,  Pulmonary:  Good air entry bilaterally,   Abdominal:  Soft, non-tender, non-distended     Aortic impulse not widened  Musculoskeletal: No edema, no tenderness  Neurological:  CN II-XII grossly intact, no focal deficits  Skin:   Skin is warm and dry. No rash noted.  Psychiatric:  Normal mood and affect.  Vascular:  Extremities warm and well perfused     There are full-thickness wounds involving the right lower leg and there are wounds of the right foot as well.  The right dorsalis pedis pulse is actually palpable   Labs:      Recent Labs     06/15/22  0825 06/16/22  0141 06/17/22  0308   SODIUM 136 134* 136   POTASSIUM 5.3 5.0 5.1   CHLORIDE 101 102 102   CO2 26 24 23   GLUCOSE 82 146* 112*   BUN 31* 33* 33*   CREATININE 1.39 1.52* 1.73*   CALCIUM 8.4* 8.0* 8.6               Radiology:  Noninvasive arterial work-up shows plaque in the right lower extremity with mild arterial insufficiency.    Assessment/Plan:  -Peripheral arterial occlusive disease with right lower extremity arterial insufficiency and nonhealing wound     recommend right lower extremity angiogram to evaluate.  Procedure discussed with the patient and his son, as well as a risks and the expected recovery afterward.  Questions answered and they agreed to " proceed      Peter Lopez MD  [facility]  Voalte preferred. Otherwise text to cell [phone] or call my office [phone]  __________________________________________________________________  Patient:Jama Altman   MRN:[id number]   CSN:[id number]

## 2022-06-17 NOTE — CARE PLAN
The patient is Stable - Low risk of patient condition declining or worsening    Shift Goals  Clinical Goals: Meeting with family, angiogram  Patient Goals: Comfort  Family Goals: N/A    Progress made toward(s) clinical / shift goals:      Patient is not progressing towards the following goals: Pt meeting with family and care team this morning at 0900, treatment plan discussed. Pt received angiogram, laid flat for 3 hours. PT at bedside for mobility. No pain stated      Problem: Discharge Barriers/Planning  Goal: Patient's continuum of care needs are met  Outcome: Not Progressing     Problem: Mobility  Goal: Patient's capacity to carry out activities will improve  Outcome: Not Progressing

## 2022-06-17 NOTE — DISCHARGE PLANNING
Case Management Discharge Planning    Admission Date: 5/24/2022  GMLOS: 12.4  ALOS: 24    6-Clicks ADL Score: 12  6-Clicks Mobility Score: 6  PT and/or OT Eval ordered: Yes  Post-acute Referrals Ordered: Yes  Post-acute Choice Obtained: Yes  Has referral(s) been sent to post-acute provider:  Yes      Anticipated Discharge Dispo: Discharge Disposition: D/T to SNF with Medicare cert in anticipation of skilled care (03)    DME Needed: Unable to determine at this time.    Action(s) Taken: Updated Provider/Nurse on Discharge Plan and Family Conference planned for 0900 today. RN CM notified by nursing staff that patient will be out of the room for a scheduled procedure at the time of the requested family meeting and will not be able to participate. RN CM called patient's son, Afshin Altman [phone], to confirm whether he would like to continue with the scheduled time or reschedule to a time where his father/patient can participate. He indicated that he would like the meeting to proceed at 0900 as scheduled and stated that he would see everyone then.    Escalations Completed: Pending Discharge Destination and Bedside RN    Medically Clear: No    Next Steps: f/u with medical and nursing teams regarding discharge plans, needs, and barriers. F/u with patient and family regarding discharge planning.     Barriers to Discharge: Medical clearance and Pending Placement    Is the patient up for discharge tomorrow: No     0930: family meeting was completed. In attendance was the Attending Physician (Dr. Suzi Gandara), Nursing Unit Supervisor (Trudy), Bedside RN (Aaron), Therapy Manager (Shawene), RN DWAINE (Lydia), and TCN (Harshad) from [facility]. Discussed plan of care, goals of care, PT/OT frequency and progress, wound care needs, long-term care concerns, current medical condition, and barriers. Family report all of their questions and concerns have been addressed. They indicate no additional questions at this time and were advised  to reach out to staff with any other concerns and questions that arise.

## 2022-06-18 LAB
ANION GAP SERPL CALC-SCNC: 10 MMOL/L (ref 7–16)
BUN SERPL-MCNC: 33 MG/DL (ref 8–22)
CALCIUM SERPL-MCNC: 8.1 MG/DL (ref 8.5–10.5)
CHLORIDE SERPL-SCNC: 105 MMOL/L (ref 96–112)
CO2 SERPL-SCNC: 22 MMOL/L (ref 20–33)
CREAT SERPL-MCNC: 1.48 MG/DL (ref 0.5–1.4)
GFR SERPLBLD CREATININE-BSD FMLA CKD-EPI: 52 ML/MIN/1.73 M 2
GLUCOSE BLD STRIP.AUTO-MCNC: 124 MG/DL (ref 65–99)
GLUCOSE BLD STRIP.AUTO-MCNC: 146 MG/DL (ref 65–99)
GLUCOSE BLD STRIP.AUTO-MCNC: 156 MG/DL (ref 65–99)
GLUCOSE BLD STRIP.AUTO-MCNC: 161 MG/DL (ref 65–99)
GLUCOSE SERPL-MCNC: 136 MG/DL (ref 65–99)
POTASSIUM SERPL-SCNC: 4.7 MMOL/L (ref 3.6–5.5)
SODIUM SERPL-SCNC: 137 MMOL/L (ref 135–145)

## 2022-06-18 PROCEDURE — 82962 GLUCOSE BLOOD TEST: CPT

## 2022-06-18 PROCEDURE — A9270 NON-COVERED ITEM OR SERVICE: HCPCS | Performed by: HOSPITALIST

## 2022-06-18 PROCEDURE — 700102 HCHG RX REV CODE 250 W/ 637 OVERRIDE(OP): Performed by: STUDENT IN AN ORGANIZED HEALTH CARE EDUCATION/TRAINING PROGRAM

## 2022-06-18 PROCEDURE — 36415 COLL VENOUS BLD VENIPUNCTURE: CPT

## 2022-06-18 PROCEDURE — 80048 BASIC METABOLIC PNL TOTAL CA: CPT

## 2022-06-18 PROCEDURE — 700111 HCHG RX REV CODE 636 W/ 250 OVERRIDE (IP): Performed by: STUDENT IN AN ORGANIZED HEALTH CARE EDUCATION/TRAINING PROGRAM

## 2022-06-18 PROCEDURE — A9270 NON-COVERED ITEM OR SERVICE: HCPCS | Performed by: STUDENT IN AN ORGANIZED HEALTH CARE EDUCATION/TRAINING PROGRAM

## 2022-06-18 PROCEDURE — 700102 HCHG RX REV CODE 250 W/ 637 OVERRIDE(OP): Performed by: HOSPITALIST

## 2022-06-18 PROCEDURE — 700111 HCHG RX REV CODE 636 W/ 250 OVERRIDE (IP): Performed by: HOSPITALIST

## 2022-06-18 PROCEDURE — 770001 HCHG ROOM/CARE - MED/SURG/GYN PRIV*

## 2022-06-18 PROCEDURE — 99232 SBSQ HOSP IP/OBS MODERATE 35: CPT | Performed by: STUDENT IN AN ORGANIZED HEALTH CARE EDUCATION/TRAINING PROGRAM

## 2022-06-18 RX ADMIN — TACROLIMUS 1 MG: 1 CAPSULE ORAL at 18:21

## 2022-06-18 RX ADMIN — PREDNISONE 5 MG: 5 TABLET ORAL at 05:31

## 2022-06-18 RX ADMIN — MYCOPHENOLATE MOFETIL 500 MG: 250 CAPSULE ORAL at 18:22

## 2022-06-18 RX ADMIN — ACETAMINOPHEN 650 MG: 325 TABLET ORAL at 14:39

## 2022-06-18 RX ADMIN — DAKIN'S SOLUTION 0.125% (QUARTER STRENGTH) 473 ML: 0.12 SOLUTION at 14:39

## 2022-06-18 RX ADMIN — MYCOPHENOLATE MOFETIL 500 MG: 250 CAPSULE ORAL at 05:31

## 2022-06-18 RX ADMIN — METOPROLOL TARTRATE 12.5 MG: 25 TABLET, FILM COATED ORAL at 05:31

## 2022-06-18 RX ADMIN — HEPARIN SODIUM 5000 UNITS: 5000 INJECTION, SOLUTION INTRAVENOUS; SUBCUTANEOUS at 05:33

## 2022-06-18 RX ADMIN — TACROLIMUS 1 MG: 1 CAPSULE ORAL at 05:31

## 2022-06-18 RX ADMIN — HEPARIN SODIUM 5000 UNITS: 5000 INJECTION, SOLUTION INTRAVENOUS; SUBCUTANEOUS at 18:22

## 2022-06-18 RX ADMIN — PATIROMER 8.4 G: 8.4 POWDER, FOR SUSPENSION ORAL at 08:29

## 2022-06-18 RX ADMIN — DAKIN'S SOLUTION 0.125% (QUARTER STRENGTH) 473 ML: 0.12 SOLUTION at 02:00

## 2022-06-18 ASSESSMENT — PAIN DESCRIPTION - PAIN TYPE
TYPE: ACUTE PAIN

## 2022-06-18 ASSESSMENT — FIBROSIS 4 INDEX: FIB4 SCORE: 1.97

## 2022-06-18 NOTE — CARE PLAN
The patient is Stable - Low risk of patient condition declining or worsening    Shift Goals  Clinical Goals: wound care  Patient Goals: comfort      Progress made toward(s) clinical / shift goals:  Wound care orders followed. Pt is able to use urinal.       Problem: Skin Integrity  Goal: Skin integrity is maintained or improved  Outcome: Progressing     Problem: Self Care  Goal: Patient will have the ability to perform ADLs independently or with assistance (bathe, groom, dress, toilet and feed)  Outcome: Progressing       Patient is not progressing towards the following goals:

## 2022-06-18 NOTE — PROGRESS NOTES
Hospital Medicine Daily Progress Note    Date of Service  6/18/2022    Chief Complaint  Jama Altman is a 66 y.o. male admitted 5/24/2022 with AMS and fall    Hospital Course  66 y.o. male w/ HTN, DLD, diabetes,  polycystic kidney dz with a renal transplant 9/10/10 and on immunosuppression but has CKD, ADELFO who presented 5/24/2022 with altered mentation.  There was initial concern that he had fallen at home and struck his head.  In the ER he was in atrial fibrillation with a rapid rate of 120-160.  He was given a fluid bolus and initiated on norepinephrine.  He was admitted for severe septic shock likely related to a soft tissue infection of the right leg from where he bumped into something several days ago. During admit he was found to have E.coli bacteremia and right leg cellulitis.  Spinal fluid ws negative for meningitis.  On 5/26 the patient had respiratory distress and was intubated. On 5/28 the patient had PEA arrest and had CPR.  A right knee aspiration was performed and fluid analysis was not showing infection.  On 6/1 the patient was extubated.  The patient had CRRT initiated on 5/27 and daily HD on 5/28 per nephrology notes. Patient shows sign of recovery and has been off dialysis for a few days.  E. coli bacteremia, source likely RLE cellulitis. ID consulted.  Completed iv meropenem with stop date 6/6/2022.     Family meeting was held on 6/17 at bedside per the son's request.  All questions were answered.  Care plan was discussed and updated with the plan son.  He is agreeable.     Interval Problem Update  Patient was seen and examined at bedside.  Denies nausea vomiting or pain.      Cre improving with IVF. Continue to monitor. baseline at 1.5s    Wound care recommended plastic surgeon consult.I talked to Dr. Hercules, who requests vascular surgery evaluation prior to any surgery.     Vascular surgery consulted, Dr. Lopez talked to the family, plan for angiogram.     Patient reported diarrhea with  stool softener.  Change Senokot to as needed    Respiratory distress resolved, off oxygen    PT/OT recommend postacute  Not a candidate for inpatient rehab   Pending SNF    Consultants/Specialty  Infectious disease  Critical care medicine  Nephrology    Code Status  Full Code    Disposition  SNF    Review of Systems  All systems reviewed and negative except as noted per above.    Physical Exam  Temp:  [36.4 °C (97.5 °F)-36.8 °C (98.3 °F)] 36.6 °C (97.8 °F)  Pulse:  [] 87  Resp:  [15-22] 17  BP: ()/(53-77) 109/64  SpO2:  [92 %-98 %] 93 %    General appearance: NAD, conversant, family at bedside  Eyes: anicteric sclerae, moist conjunctivae; no lid-lag; PERRLA, noted subconjunctival hemorrhage, pronounce on R eye   HENT: Atraumatic; oropharynx clear with moist mucous membranes and no mucosal ulcerations; normal hard and soft palate  Neck: Trachea midline; FROM, supple, no thyromegaly or lymphadenopathy  Lungs: CTA, with normal respiratory effort and no intercostal retractions  CV: RRR, no MRGs  Abdomen: Soft, non-tender; no masses or HSM  Extremities: RLE erythema with clean dressing  Skin: Normal temperature, turgor and texture; no rash, ulcers or subcutaneous nodules  Psych: Answering questions      Current Facility-Administered Medications:   •  senna-docusate (PERICOLACE or SENOKOT S) 8.6-50 MG per tablet 2 Tablet, 2 Tablet, Oral, BID PRN **AND** polyethylene glycol/lytes (MIRALAX) PACKET 1 Packet, 1 Packet, Oral, QDAY PRN **AND** [DISCONTINUED] magnesium hydroxide (MILK OF MAGNESIA) suspension 30 mL, 30 mL, Oral, QDAY PRN **AND** bisacodyl (DULCOLAX) suppository 10 mg, 10 mg, Rectal, QDAY PRN, Suzi Gandara M.D.  •  insulin GLARGINE (Lantus,Semglee) injection, 15 Units, Subcutaneous, BID, Timo Muñoz M.D., 15 Units at 06/18/22 0824  •  metoprolol tartrate (LOPRESSOR) tablet 12.5 mg, 12.5 mg, Oral, TWICE DAILY, Timo Muñoz M.D., 12.5 mg at 06/18/22 0531  •  patiromer (VELTASSA) powder for oral suspension  8.4 g, 8.4 g, Oral, DAILY, Roderick Ramirez M.D., 8.4 g at 06/18/22 0829  •  insulin regular (HumuLIN R,NovoLIN R) injection, 3-14 Units, Subcutaneous, 4X/DAY ACHS, 3 Units at 06/16/22 1210 **AND** POC blood glucose manual result, , , Q AC AND BEDTIME(S) **AND** NOTIFY MD and PharmD, , , Once **AND** Administer 20 grams of glucose (approximately 8 ounces of fruit juice) every 15 minutes PRN FSBG less than 70 mg/dL, , , PRN **AND** dextrose 50% (D50W) injection 25 g, 25 g, Intravenous, Q15 MIN PRN, Linsey M Wegener, A.P.R.N.  •  acetaminophen (Tylenol) tablet 650 mg, 650 mg, Oral, Q6HRS PRN, Mehrdad Laughlin M.D., 650 mg at 06/16/22 2129  •  tacrolimus (PROGRAF) capsule 1 mg, 1 mg, Oral, BID, Mehrdad Laughlin M.D., 1 mg at 06/18/22 0531  •  mycophenolate (CELLCEPT) capsule 500 mg, 500 mg, Oral, BID, Mehrdad Laughlin M.D., 500 mg at 06/18/22 0531  •  oxyCODONE immediate-release (ROXICODONE) tablet 5 mg, 5 mg, Oral, Q4HRS PRN, Mehrdad Laughlin M.D.  •  predniSONE (DELTASONE) tablet 5 mg, 5 mg, Oral, DAILY, Mehrdad Laughlin M.D., 5 mg at 06/18/22 0531  •  heparin injection 5,000 Units, 5,000 Units, Subcutaneous, Q12HRS, James Titus M.D., 5,000 Units at 06/18/22 0533  •  heparin injection 2,800 Units, 2,800 Units, Intracatheter, DIALYSIS PRN, James Sheppard M.D., 2,800 Units at 06/02/22 1400  •  Respiratory Therapy Consult, , Nebulization, Continuous RT, Reuben Swartz M.D.  •  sodium chloride (OCEAN) 0.65 % nasal spray 2 Spray, 2 Spray, Nasal, Q2HRS PRN, Kayy Hopkins M.D., 2 [place] at 06/14/22 1742  •  dakins 0.125% (1/4 strength) topical soln, , Topical, BID, Suzi Gandara M.D., 473 mL at 06/18/22 0200  •  HYDROmorphone (Dilaudid) injection 0.5 mg, 0.5 mg, Intravenous, Q2HRS PRN, Tracey Whitfield M.D., 0.5 mg at 05/31/22 0059      Fluids    Intake/Output Summary (Last 24 hours) at 6/18/2022 1133  Last data filed at 6/18/2022 0825  Gross per 24 hour   Intake 240 ml   Output --   Net 240 ml       Laboratory      Recent Labs      06/16/22  0141 06/17/22  0308 06/18/22  0022   SODIUM 134* 136 137   POTASSIUM 5.0 5.1 4.7   CHLORIDE 102 102 105   CO2 24 23 22   GLUCOSE 146* 112* 136*   BUN 33* 33* 33*   CREATININE 1.52* 1.73* 1.48*   CALCIUM 8.0* 8.6 8.1*                   Imaging  DX-CHEST-PORTABLE (1 VIEW)   Final Result      1.  Interval improvement in bilateral pulmonary opacities, likely improving edema.   2.  Bilateral infrahilar atelectasis versus consolidations persist. No effusions.         CT-HEAD W/O   Final Result      1.  Cerebral atrophy.      2.  Bilateral mastoid effusions.      3.  Otherwise, Head CT without contrast within normal limits. No evidence of acute intracranial hemorrhage or mass lesion.         DX-ABDOMEN FOR TUBE PLACEMENT   Final Result      Enteric feeding tube terminates with the tip projecting over the expected location of the 2nd-3rd portion of the duodenum.      DX-CHEST-LIMITED (1 VIEW)   Final Result      1.  Bibasilar underinflation atelectasis which could obscure an additional process. This is unchanged.   2.  Interstitial opacities likely pulmonary edema   3.  Persistently enlarged cardiac silhouette      DX-CHEST-LIMITED (1 VIEW)   Final Result      1.  Hypoinflation with mildly increased left basilar atelectasis.   2.  Removal of endotracheal tube.      DX-CHEST-LIMITED (1 VIEW)   Final Result         No significant interval change.      DX-CHEST-LIMITED (1 VIEW)   Final Result      Stable areas of patchy atelectasis/consolidation.      DX-CHEST-PORTABLE (1 VIEW)   Final Result      Stable chest x-ray findings.      DX-CHEST-PORTABLE (1 VIEW)   Final Result         1.  Pulmonary edema and/or infiltrates, somewhat increased particularly in the right lung base compared to prior study.   2.  Cardiomegaly      CT-ABDOMEN-PELVIS W/O   Final Result         Limited noncontrast exam      1. Long segment wall thickening from the descending colon to the sigmoid colon could relate to underdistention or  colitis. Air-fluid level in the more proximal colon is likely diarrheal disease.      2. Right lower quadrant transplant kidney with retained prior contrast, likely secondary to renal failure/ATN/contrast nephropathy. No hydronephrosis.      Absent right kidney. Polycystic left kidney.      CT-EXTREMITY, LOWER W/O RIGHT   Final Result      1. Postsurgical changes of right total knee arthroplasty.   2. Right knee joint effusion with thickening of the joint capsule and surrounding soft tissue edema.   3. Circumferential edema involving the soft tissues of the right lower leg with an anterior soft tissue defect and with areas of clustering on the medial right lower leg skin surface.   4. Arteriosclerosis.      DX-CHEST-PORTABLE (1 VIEW)   Final Result      1.  Unchanged position of supporting devices are visualized extent   2.  No visible pneumothorax following chest compressions   3.  Unchanged atelectasis and possible superimposed interstitial pulmonary edema or atypical pneumonia      US-EXTREMITY ARTERY LOWER UNILAT RIGHT   Final Result      US-MIRELLA SINGLE LEVEL BILAT   Final Result      US-EXTREMITY ARTERY LOWER BILAT W/MIRELLA (COMBO)   Final Result      DX-CHEST-PORTABLE (1 VIEW)   Final Result         1.  Pulmonary edema and/or infiltrates, somewhat increased particularly in the right lung base compared to prior study.   2.  Cardiomegaly      DX-ABDOMEN FOR TUBE PLACEMENT   Final Result      1. The tip of the feeding tube terminates over the junction of the gastric pylorus and duodenal bulb.   2. The remainder is stable.      DX-CHEST-PORTABLE (1 VIEW)   Final Result      1. Interval decrease in size of the right pleural effusion.   2. No left pleural effusion.   3. No visible pneumothorax status post bronchoscopy.   4. Interval placement of a Dobbhoff feeding tube.   5. Improving parenchymal loss in the right lung base, incompletely resolved.   6. The remainder is stable.      DX-CHEST-PORTABLE (1 VIEW)   Final  Result      1. Interval development of a moderate right pleural effusion after placement of a left internal jugular dialysis catheter, abutting the right lateral wall of the right atrium. Verification of line position with contrast injection under fluoroscopy is    offered.   2. Improved perihilar atelectasis.   3. The remainder is stable.      I, Dr. Matthew Brown, discussed the results of this examination directly by phone with Dr. Reuben Swartz on 5/26/2022 at 0855 hours.      EC-ECHOCARDIOGRAM COMPLETE W/ CONT   Final Result      DX-CHEST-LIMITED (1 VIEW)   Final Result         1. Right internal jugular central venous access catheter placed in the interval terminates over the upper aspect of the right atrium. No postprocedure visible pneumothorax.   2. Stable patchy parenchymal opacities in the lungs, with a stable small left pleural effusion.      DX-CHEST-PORTABLE (1 VIEW)   Final Result         1.  Pulmonary edema and/or infiltrates are identified, which are stable since the prior exam.   2.  Trace bilateral pleural effusions   3.  Cardiomegaly      CT-EXTREMITY, LOWER W/O RIGHT   Final Result      1.  Status post right total knee replacement.      2.  Diffuse atherosclerotic calcification of the arterial system of the right lower extremity suspicious for diabetes mellitus.      3.  Soft tissue attenuation in the subcutaneous tissues of the mid to distal lower leg and foot suspicious for cellulitis.      4.  No evidence of soft tissue ulceration in the right lower leg or foot.      5.  No evidence of acute osteomyelitis in the right lower leg or foot.      6.  Small subcutaneous abscesses cannot be excluded without the use of intravenous contrast.      DX-CHEST-PORTABLE (1 VIEW)   Final Result      Left internal jugular central venous access catheter terminates over a persistent left superior vena cava. No postprocedure visible pneumothorax.      The remainder is stable.      DX-TIBIA AND FIBULA RIGHT    Final Result      1. Right lower leg soft tissue swelling.   2. No acute fracture or subluxation.   3. Postsurgical changes of right total knee arthroplasty.      US-ABDOMEN F.A.S.T. LTD (FOR ED USE ONLY)   Final Result      No free fluid seen in all 4 quadrants.      Negative FAST scan.            CT-ABDOMEN-PELVIS WITH   Final Result      1. No acute posttraumatic findings in the abdomen or pelvis.   2. Bibasilar subsegmental atelectasis.   3. Right pelvic transplant kidney without hydronephrosis.   4. Polycystic left kidney.   5. Diverticulosis without diverticulitis. Normal appendix.      CT-CTA CHEST PULMONARY ARTERY W/ RECONS   Final Result      1.  No CT evidence of pulmonary emboli.      2.  Bibasilar atelectasis.      3.  No evidence of rib fracture.      4. Diffuse coronary artery calcification.      CT-HEAD W/O   Final Result      1.  Cerebral atrophy.      2.  Otherwise, Head CT without contrast within normal limits. No evidence of acute cerebral infarction, hemorrhage or mass lesion.         CT-CSPINE WITHOUT PLUS RECONS   Final Result      1.  Moderate osteoarthritic changes at the C6-7 level with disc space narrowing and marginal osteophytosis. Further there is moderate cervical spondylotic changes at this level.      2.  No evidence of cervical spine fracture and/or subluxation.      DX-PELVIS-1 OR 2 VIEWS   Final Result      1.  Unremarkable single AP view of the pelvis.      DX-CHEST-LIMITED (1 VIEW)   Final Result      1.  Curvilinear perihilar opacities likely atelectasis and/or parenchymal scarring.      2.  Mild cardiomegaly.      IR-EXTREMITY ANGIOGRAM-UNILATERAL RIGHT    (Results Pending)        Assessment/Plan  * Acute renal failure superimposed on stage 3a chronic kidney disease (HCC)  Assessment & Plan  Nephrology consulting  Hemodialysis as per nephrology. Has been off HD as renal function improving, dialysis catheter has been removed  On IV fluid  Monitor vitals, I/O's, labs  Avoid  nephrotoxins.  Immunosuppression for hx of renal transplant    Improving    Non-healing wound of right lower extremity  Assessment & Plan  RLE Arterial ultrasound 5/2022 showed mild arterial insufficiency, no significant stenosis or occlusion   S/p debridement 5/24, 6/16 by wound care  Wound care following, recommended plastic surgeon consult  I talked to plastic surgeon , vascular surgery evaluation prior to any surgery.     Vascular surgery consulted, Dr. Lopez plan to have angiogram.                 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    Bacteremia due to Escherichia coli- (present on admission)  Assessment & Plan  Blood culture pos E coli on 5/24. Repeated blood culture negative to date. Source likely sec to RLE cellulitis  Completed 10 days course of IV meropenem (started on 5/28 given new fever and worsening chest x-ray) on 6/6  ID consulted     Acute respiratory failure with hypoxia ([facility])  Assessment & Plan  Intubated 5/26 and extubated 6/1  Mobilize as able  Respiratory protocol  Supplemental oxygen and wean as able      Knee effusion, right  Assessment & Plan  s/p bedside right knee joint aspiration on 5/28.  Synovial fluid analysis reveals hazy priscila fluid, 2638 WBCs with PMN predominance and no crystals seen.  Fluid not consistent with infection.  Culture neg    Cardiac arrest (HCC)  Assessment & Plan  S/p PEA  Monitor labs, vitals.    Acute metabolic encephalopathy  Assessment & Plan  Improving.  Monitor neurochecks  Treating infection as likely etiology    Resolved    Acute on chronic systolic heart failure (HCC)- (present on admission)  Assessment & Plan  With  most recent ejection fraction 35%  Be conservative with IV fluid  On beta-blocker, ACEI on hold due to SANKET    Continue to monitor      PAF (paroxysmal atrial fibrillation) (HCC)- (present on admission)  Assessment & Plan  Hx of pAFib , not on OAC at home.   Outpatient metoprolol 25mg daily on hold due to septic shock and borderline hypotension.  Resumed metoprolol 12.5 mg twice daily on 6/10 with holding parameters  Outpatient cardiology/PCP follow up regards OAC use.     Type 2 diabetes mellitus with hyperlipidemia (HCC)- (present on admission)  Assessment & Plan  On lantus and SSI. Holding home po diabetes meds  Hypoglycemic protocol   A1c 10.7    Thrombocytopenia (HCC)- (present on admission)  Assessment & Plan  Resolved     Hyperkalemia  Assessment & Plan  Low potassium diet  On [place]  Continue to monitor    Resolved    Septic shock (HCC)- (present on admission)  Assessment & Plan  Sec to E.coli bacteremia from RLE cellulitis  Completed abx   Resolved       VTE prophylaxis: heparin ppx    I have performed a physical exam and reviewed and updated ROS and Plan today (6/18/2022). In review of yesterday's note (6/17/2022), there are no changes except as documented above  Hospital Medicine Daily Progress Note    Date of Service  6/18/2022    Chief Complaint  Jama Altman is a 66 y.o. male admitted 5/24/2022 with AMS and fall    Hospital Course  66 y.o. male w/ HTN, DLD, diabetes,  polycystic kidney dz with a renal transplant 9/10/10 and on immunosuppression but has CKD, ADELFO who presented 5/24/2022 with altered mentation.  There was initial concern that he had fallen at home and struck his head.  In the ER he was in atrial fibrillation with a rapid rate of 120-160.  He was given a fluid bolus and initiated on norepinephrine.  He was admitted for severe septic shock likely related to a soft tissue infection of the right leg from where he bumped into something several days ago. During admit he was found to have  E.coli bacteremia and right leg cellulitis.  Spinal fluid ws negative for meningitis.  On 5/26 the patient had respiratory distress and was intubated. On 5/28 the patient had PEA arrest and had CPR.  A right knee aspiration was performed and fluid analysis was not showing infection.  On 6/1 the patient was extubated.  The patient had CRRT initiated on 5/27 and daily HD on 5/28 per nephrology notes. Patient shows sign of recovery and has been off dialysis for a few days.  E. coli bacteremia, source likely RLE cellulitis. ID consulted.  Completed iv meropenem with stop date 6/6/2022.     Interval Problem Update  Patient was seen and examined at bedside.  Denies nausea vomiting or pain.     Pending wound care recommendation    Patient reported diarrhea with stool softener.  Change Senokot to as needed    SANKET resolved, baseline at 1.5s, off IV fluid  Respiratory distress resolved, off oxygen    PT/OT recommend postacute  Not a candidate for inpatient rehab   Pending SNF    Consultants/Specialty  Infectious disease  Critical care medicine  Nephrology    Code Status  Full Code    Disposition  [facility]    Review of Systems  All systems reviewed and negative except as noted per above.    Physical Exam  Temp:  [36.4 °C (97.5 °F)-36.8 °C (98.3 °F)] 36.6 °C (97.8 °F)  Pulse:  [] 87  Resp:  [15-22] 17  BP: ()/(53-77) 109/64  SpO2:  [92 %-98 %] 93 %    General appearance: NAD, conversant, family at bedside  Eyes: anicteric sclerae, moist conjunctivae; no lid-lag; PERRLA, noted subconjunctival hemorrhage, pronounce on R eye   HENT: Atraumatic; oropharynx clear with moist mucous membranes and no mucosal ulcerations; normal hard and soft palate  Neck: Trachea midline; FROM, supple, no thyromegaly or lymphadenopathy  Lungs: CTA, with normal respiratory effort and no intercostal retractions  CV: RRR, no MRGs  Abdomen: Soft, non-tender; no masses or HSM  Extremities: RLE erythema with clean dressing  Skin: Normal temperature,  turgor and texture; no rash, ulcers or subcutaneous nodules  Psych: Answering questions      Current Facility-Administered Medications:   •  senna-docusate (PERICOLACE or SENOKOT S) 8.6-50 MG per tablet 2 Tablet, 2 Tablet, Oral, BID PRN **AND** polyethylene glycol/lytes (MIRALAX) PACKET 1 Packet, 1 Packet, Oral, QDAY PRN **AND** [DISCONTINUED] magnesium hydroxide (MILK OF MAGNESIA) suspension 30 mL, 30 mL, Oral, QDAY PRN **AND** bisacodyl (DULCOLAX) suppository 10 mg, 10 mg, Rectal, QDAY PRN, Suzi Gandara M.D.  •  insulin GLARGINE (Lantus,Semglee) injection, 15 Units, Subcutaneous, BID, Timo Muñoz M.D., 15 Units at 06/18/22 0824  •  metoprolol tartrate (LOPRESSOR) tablet 12.5 mg, 12.5 mg, Oral, TWICE DAILY, Timo Muñoz M.D., 12.5 mg at 06/18/22 0531  •  patiromer (VELTASSA) powder for oral suspension 8.4 g, 8.4 g, Oral, DAILY, Roderick Ramirez M.D., 8.4 g at 06/18/22 0829  •  insulin regular (HumuLIN R,NovoLIN R) injection, 3-14 Units, Subcutaneous, 4X/DAY ACHS, 3 Units at 06/16/22 1210 **AND** POC blood glucose manual result, , , Q AC AND BEDTIME(S) **AND** NOTIFY MD and PharmD, , , Once **AND** Administer 20 grams of glucose (approximately 8 ounces of fruit juice) every 15 minutes PRN FSBG less than 70 mg/dL, , , PRN **AND** dextrose 50% (D50W) injection 25 g, 25 g, Intravenous, Q15 MIN PRN, Linsey M Wegener, A.PRudiRCOLETTE  •  acetaminophen (Tylenol) tablet 650 mg, 650 mg, Oral, Q6HRS PRN, Mehrdad Laughlin M.D., 650 mg at 06/16/22 2129  •  tacrolimus (PROGRAF) capsule 1 mg, 1 mg, Oral, BID, Mehrdad Laughlin M.D., 1 mg at 06/18/22 0531  •  mycophenolate (CELLCEPT) capsule 500 mg, 500 mg, Oral, BID, Mehrdad Laughlin M.D., 500 mg at 06/18/22 0531  •  oxyCODONE immediate-release (ROXICODONE) tablet 5 mg, 5 mg, Oral, Q4HRS PRN, Mehrdad Laughlin M.D.  •  predniSONE (DELTASONE) tablet 5 mg, 5 mg, Oral, DAILY, Mehrdad Laughlin M.D., 5 mg at 06/18/22 0531  •  heparin injection 5,000 Units, 5,000 Units, Subcutaneous, Q12HRS, Yo Sup  MERCED Titus, 5,000 Units at 06/18/22 0533  •  heparin injection 2,800 Units, 2,800 Units, Intracatheter, DIALYSIS PRN, James Sheppard M.D., 2,800 Units at 06/02/22 1400  •  Respiratory Therapy Consult, , Nebulization, Continuous RT, Reuben Swartz M.D.  •  sodium chloride (OCEAN) 0.65 % nasal spray 2 Spray, 2 Spray, Nasal, Q2HRS PRN, Kayy Hopkins M.D., 2 [place] at 06/14/22 1742  •  dakins 0.125% (1/4 strength) topical soln, , Topical, BID, Suzi Gandara M.D., 473 mL at 06/18/22 0200  •  HYDROmorphone (Dilaudid) injection 0.5 mg, 0.5 mg, Intravenous, Q2HRS PRN, Tracey Whitfield M.D., 0.5 mg at 05/31/22 0059      Fluids    Intake/Output Summary (Last 24 hours) at 6/18/2022 1133  Last data filed at 6/18/2022 0825  Gross per 24 hour   Intake 240 ml   Output --   Net 240 ml       Laboratory      Recent Labs     06/16/22  0141 06/17/22  0308 06/18/22  0022   SODIUM 134* 136 137   POTASSIUM 5.0 5.1 4.7   CHLORIDE 102 102 105   CO2 24 23 22   GLUCOSE 146* 112* 136*   BUN 33* 33* 33*   CREATININE 1.52* 1.73* 1.48*   CALCIUM 8.0* 8.6 8.1*                   Imaging  DX-CHEST-PORTABLE (1 VIEW)   Final Result      1.  Interval improvement in bilateral pulmonary opacities, likely improving edema.   2.  Bilateral infrahilar atelectasis versus consolidations persist. No effusions.         CT-HEAD W/O   Final Result      1.  Cerebral atrophy.      2.  Bilateral mastoid effusions.      3.  Otherwise, Head CT without contrast within normal limits. No evidence of acute intracranial hemorrhage or mass lesion.         DX-ABDOMEN FOR TUBE PLACEMENT   Final Result      Enteric feeding tube terminates with the tip projecting over the expected location of the 2nd-3rd portion of the duodenum.      DX-CHEST-LIMITED (1 VIEW)   Final Result      1.  Bibasilar underinflation atelectasis which could obscure an additional process. This is unchanged.   2.  Interstitial opacities likely pulmonary edema   3.  Persistently enlarged cardiac silhouette       DX-CHEST-LIMITED (1 VIEW)   Final Result      1.  Hypoinflation with mildly increased left basilar atelectasis.   2.  Removal of endotracheal tube.      DX-CHEST-LIMITED (1 VIEW)   Final Result         No significant interval change.      DX-CHEST-LIMITED (1 VIEW)   Final Result      Stable areas of patchy atelectasis/consolidation.      DX-CHEST-PORTABLE (1 VIEW)   Final Result      Stable chest x-ray findings.      DX-CHEST-PORTABLE (1 VIEW)   Final Result         1.  Pulmonary edema and/or infiltrates, somewhat increased particularly in the right lung base compared to prior study.   2.  Cardiomegaly      CT-ABDOMEN-PELVIS W/O   Final Result         Limited noncontrast exam      1. Long segment wall thickening from the descending colon to the sigmoid colon could relate to underdistention or colitis. Air-fluid level in the more proximal colon is likely diarrheal disease.      2. Right lower quadrant transplant kidney with retained prior contrast, likely secondary to renal failure/ATN/contrast nephropathy. No hydronephrosis.      Absent right kidney. Polycystic left kidney.      CT-EXTREMITY, LOWER W/O RIGHT   Final Result      1. Postsurgical changes of right total knee arthroplasty.   2. Right knee joint effusion with thickening of the joint capsule and surrounding soft tissue edema.   3. Circumferential edema involving the soft tissues of the right lower leg with an anterior soft tissue defect and with areas of clustering on the medial right lower leg skin surface.   4. Arteriosclerosis.      DX-CHEST-PORTABLE (1 VIEW)   Final Result      1.  Unchanged position of supporting devices are visualized extent   2.  No visible pneumothorax following chest compressions   3.  Unchanged atelectasis and possible superimposed interstitial pulmonary edema or atypical pneumonia      US-EXTREMITY ARTERY LOWER UNILAT RIGHT   Final Result      US-MIRELLA SINGLE LEVEL BILAT   Final Result      US-EXTREMITY ARTERY LOWER BILAT  W/MIRELLA (COMBO)   Final Result      DX-CHEST-PORTABLE (1 VIEW)   Final Result         1.  Pulmonary edema and/or infiltrates, somewhat increased particularly in the right lung base compared to prior study.   2.  Cardiomegaly      DX-ABDOMEN FOR TUBE PLACEMENT   Final Result      1. The tip of the feeding tube terminates over the junction of the gastric pylorus and duodenal bulb.   2. The remainder is stable.      DX-CHEST-PORTABLE (1 VIEW)   Final Result      1. Interval decrease in size of the right pleural effusion.   2. No left pleural effusion.   3. No visible pneumothorax status post bronchoscopy.   4. Interval placement of a Dobbhoff feeding tube.   5. Improving parenchymal loss in the right lung base, incompletely resolved.   6. The remainder is stable.      DX-CHEST-PORTABLE (1 VIEW)   Final Result      1. Interval development of a moderate right pleural effusion after placement of a left internal jugular dialysis catheter, abutting the right lateral wall of the right atrium. Verification of line position with contrast injection under fluoroscopy is    offered.   2. Improved perihilar atelectasis.   3. The remainder is stable.      I, Dr. Matthew Brown, discussed the results of this examination directly by phone with Dr. Reuben Swartz on 5/26/2022 at 0855 hours.      EC-ECHOCARDIOGRAM COMPLETE W/ CONT   Final Result      DX-CHEST-LIMITED (1 VIEW)   Final Result         1. Right internal jugular central venous access catheter placed in the interval terminates over the upper aspect of the right atrium. No postprocedure visible pneumothorax.   2. Stable patchy parenchymal opacities in the lungs, with a stable small left pleural effusion.      DX-CHEST-PORTABLE (1 VIEW)   Final Result         1.  Pulmonary edema and/or infiltrates are identified, which are stable since the prior exam.   2.  Trace bilateral pleural effusions   3.  Cardiomegaly      CT-EXTREMITY, LOWER W/O RIGHT   Final Result      1.  Status  post right total knee replacement.      2.  Diffuse atherosclerotic calcification of the arterial system of the right lower extremity suspicious for diabetes mellitus.      3.  Soft tissue attenuation in the subcutaneous tissues of the mid to distal lower leg and foot suspicious for cellulitis.      4.  No evidence of soft tissue ulceration in the right lower leg or foot.      5.  No evidence of acute osteomyelitis in the right lower leg or foot.      6.  Small subcutaneous abscesses cannot be excluded without the use of intravenous contrast.      DX-CHEST-PORTABLE (1 VIEW)   Final Result      Left internal jugular central venous access catheter terminates over a persistent left superior vena cava. No postprocedure visible pneumothorax.      The remainder is stable.      DX-TIBIA AND FIBULA RIGHT   Final Result      1. Right lower leg soft tissue swelling.   2. No acute fracture or subluxation.   3. Postsurgical changes of right total knee arthroplasty.      US-ABDOMEN F.A.S.T. LTD (FOR ED USE ONLY)   Final Result      No free fluid seen in all 4 quadrants.      Negative FAST scan.            CT-ABDOMEN-PELVIS WITH   Final Result      1. No acute posttraumatic findings in the abdomen or pelvis.   2. Bibasilar subsegmental atelectasis.   3. Right pelvic transplant kidney without hydronephrosis.   4. Polycystic left kidney.   5. Diverticulosis without diverticulitis. Normal appendix.      CT-CTA CHEST PULMONARY ARTERY W/ RECONS   Final Result      1.  No CT evidence of pulmonary emboli.      2.  Bibasilar atelectasis.      3.  No evidence of rib fracture.      4. Diffuse coronary artery calcification.      CT-HEAD W/O   Final Result      1.  Cerebral atrophy.      2.  Otherwise, Head CT without contrast within normal limits. No evidence of acute cerebral infarction, hemorrhage or mass lesion.         CT-CSPINE WITHOUT PLUS RECONS   Final Result      1.  Moderate osteoarthritic changes at the C6-7 level with disc space  narrowing and marginal osteophytosis. Further there is moderate cervical spondylotic changes at this level.      2.  No evidence of cervical spine fracture and/or subluxation.      DX-PELVIS-1 OR 2 VIEWS   Final Result      1.  Unremarkable single AP view of the pelvis.      DX-CHEST-LIMITED (1 VIEW)   Final Result      1.  Curvilinear perihilar opacities likely atelectasis and/or parenchymal scarring.      2.  Mild cardiomegaly.      IR-EXTREMITY ANGIOGRAM-UNILATERAL RIGHT    (Results Pending)        Assessment/Plan  * Acute renal failure superimposed on stage 3a chronic kidney disease (HCC)  Assessment & Plan  Nephrology consulting  Hemodialysis as per nephrology. Has been off HD as renal function improving, dialysis catheter has been removed  On IV fluid  Monitor vitals, I/O's, labs  Avoid nephrotoxins.  Immunosuppression for hx of renal transplant    Improving    Non-healing wound of right lower extremity  Assessment & Plan  RLE Arterial ultrasound 5/2022 showed mild arterial insufficiency, no significant stenosis or occlusion   S/p debridement 5/24, 6/16 by wound care  Wound care following, recommended plastic surgeon consult  I talked to plastic surgeon , vascular surgery evaluation prior to any surgery.     Vascular surgery consulted, Dr. Lopez plan to have angiogram.                 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    Bacteremia due to Escherichia coli- (present on admission)  Assessment & Plan  Blood culture pos E coli on 5/24. Repeated blood culture negative to date. Source likely sec to RLE cellulitis  Completed 10 days course of IV meropenem (started on 5/28  given new fever and worsening chest x-ray) on 6/6  ID consulted     Acute respiratory failure with hypoxia (HCC)  Assessment & Plan  Intubated 5/26 and extubated 6/1  Mobilize as able  Respiratory protocol  Supplemental oxygen and wean as able      Knee effusion, right  Assessment & Plan  s/p bedside right knee joint aspiration on 5/28.  Synovial fluid analysis reveals hazy priscila fluid, 2638 WBCs with PMN predominance and no crystals seen.  Fluid not consistent with infection.  Culture neg    Cardiac arrest (HCC)  Assessment & Plan  S/p PEA  Monitor labs, vitals.    Acute metabolic encephalopathy  Assessment & Plan  Improving.  Monitor neurochecks  Treating infection as likely etiology    Resolved    Acute on chronic systolic heart failure (HCC)- (present on admission)  Assessment & Plan  With most recent ejection fraction 35%  Be conservative with IV fluid  On beta-blocker, ACEI on hold due to SANKET    Continue to monitor      PAF (paroxysmal atrial fibrillation) (HCC)- (present on admission)  Assessment & Plan  Hx of pAFib , not on OAC at home.   Outpatient metoprolol 25mg daily on hold due to septic shock and borderline hypotension.  Resumed metoprolol 12.5 mg twice daily on 6/10 with holding parameters  Outpatient cardiology/PCP follow up regards OAC use.     Type 2 diabetes mellitus with hyperlipidemia (HCC)- (present on admission)  Assessment & Plan  On lantus and SSI. Holding home po diabetes meds  Hypoglycemic protocol   A1c 10.7    Thrombocytopenia (HCC)- (present on admission)  Assessment & Plan  Resolved     Hyperkalemia  Assessment & Plan  Low potassium diet  On Velt[place]  Continue to monitor    Resolved    Septic shock (HCC)- (present on admission)  Assessment & Plan  Sec to E.coli bacteremia from RLE cellulitis  Completed abx   Resolved       VTE prophylaxis: heparin ppx    I have performed a physical exam and reviewed and updated ROS and Plan today (6/18/2022). In review of yesterday's note (6/17/2022),  there are no changes except as documented above

## 2022-06-18 NOTE — PROGRESS NOTES
Rec'd report from day shift RN. Assumed pt care. Assessment completed. AA&OX3, reoriented to time. Denies pain at this time. No s/s of discomfort or distress. Q2 hour turns enforced. Dressing to RLE, left knee is CDI. Orange cap cream applied to sacrum/buttock. Bed in lowest position, bed locked, bed alarm on for safety, RN and CNA numbers provided, call light within reach.

## 2022-06-18 NOTE — CARE PLAN
The patient is Stable - Low risk of patient condition declining or worsening    Shift Goals  Clinical Goals: Wound care  Patient Goals: rest, visit with family  Family Goals: n/a    Progress made toward(s) clinical / shift goals:    Problem: Knowledge Deficit - Standard  Goal: Patient and family/care givers will demonstrate understanding of plan of care, disease process/condition, diagnostic tests and medications  Outcome: Progressing     Problem: Pain - Standard  Goal: Alleviation of pain or a reduction in pain to the patient’s comfort goal  Outcome: Progressing     Problem: Skin Integrity  Goal: Skin integrity is maintained or improved  Outcome: Progressing     Problem: Fall Risk  Goal: Patient will remain free from falls  Outcome: Progressing       Patient is not progressing towards the following goals:

## 2022-06-18 NOTE — PROGRESS NOTES
Received report from NOC RN and assumed care of patient. Pt A&Ox3. Pt denies pain at this time. Call light and belongings within reach. Bed locked and in low position.

## 2022-06-19 LAB
ANION GAP SERPL CALC-SCNC: 10 MMOL/L (ref 7–16)
BUN SERPL-MCNC: 29 MG/DL (ref 8–22)
CALCIUM SERPL-MCNC: 8.2 MG/DL (ref 8.5–10.5)
CHLORIDE SERPL-SCNC: 105 MMOL/L (ref 96–112)
CO2 SERPL-SCNC: 21 MMOL/L (ref 20–33)
CREAT SERPL-MCNC: 1.41 MG/DL (ref 0.5–1.4)
GFR SERPLBLD CREATININE-BSD FMLA CKD-EPI: 55 ML/MIN/1.73 M 2
GLUCOSE BLD STRIP.AUTO-MCNC: 109 MG/DL (ref 65–99)
GLUCOSE BLD STRIP.AUTO-MCNC: 140 MG/DL (ref 65–99)
GLUCOSE BLD STRIP.AUTO-MCNC: 147 MG/DL (ref 65–99)
GLUCOSE BLD STRIP.AUTO-MCNC: 89 MG/DL (ref 65–99)
GLUCOSE SERPL-MCNC: 102 MG/DL (ref 65–99)
POTASSIUM SERPL-SCNC: 4.3 MMOL/L (ref 3.6–5.5)
SODIUM SERPL-SCNC: 136 MMOL/L (ref 135–145)

## 2022-06-19 PROCEDURE — 700111 HCHG RX REV CODE 636 W/ 250 OVERRIDE (IP): Performed by: HOSPITALIST

## 2022-06-19 PROCEDURE — 770001 HCHG ROOM/CARE - MED/SURG/GYN PRIV*

## 2022-06-19 PROCEDURE — A9270 NON-COVERED ITEM OR SERVICE: HCPCS | Performed by: STUDENT IN AN ORGANIZED HEALTH CARE EDUCATION/TRAINING PROGRAM

## 2022-06-19 PROCEDURE — 36415 COLL VENOUS BLD VENIPUNCTURE: CPT

## 2022-06-19 PROCEDURE — 99232 SBSQ HOSP IP/OBS MODERATE 35: CPT | Performed by: STUDENT IN AN ORGANIZED HEALTH CARE EDUCATION/TRAINING PROGRAM

## 2022-06-19 PROCEDURE — 700111 HCHG RX REV CODE 636 W/ 250 OVERRIDE (IP): Performed by: STUDENT IN AN ORGANIZED HEALTH CARE EDUCATION/TRAINING PROGRAM

## 2022-06-19 PROCEDURE — 700102 HCHG RX REV CODE 250 W/ 637 OVERRIDE(OP): Performed by: STUDENT IN AN ORGANIZED HEALTH CARE EDUCATION/TRAINING PROGRAM

## 2022-06-19 PROCEDURE — 80048 BASIC METABOLIC PNL TOTAL CA: CPT

## 2022-06-19 PROCEDURE — 82962 GLUCOSE BLOOD TEST: CPT | Mod: 91

## 2022-06-19 RX ADMIN — MYCOPHENOLATE MOFETIL 500 MG: 250 CAPSULE ORAL at 17:20

## 2022-06-19 RX ADMIN — PREDNISONE 5 MG: 5 TABLET ORAL at 06:24

## 2022-06-19 RX ADMIN — HEPARIN SODIUM 5000 UNITS: 5000 INJECTION, SOLUTION INTRAVENOUS; SUBCUTANEOUS at 17:20

## 2022-06-19 RX ADMIN — HEPARIN SODIUM 5000 UNITS: 5000 INJECTION, SOLUTION INTRAVENOUS; SUBCUTANEOUS at 06:23

## 2022-06-19 RX ADMIN — PATIROMER 8.4 G: 8.4 POWDER, FOR SUSPENSION ORAL at 08:26

## 2022-06-19 RX ADMIN — METOPROLOL TARTRATE 12.5 MG: 25 TABLET, FILM COATED ORAL at 17:20

## 2022-06-19 RX ADMIN — METOPROLOL TARTRATE 12.5 MG: 25 TABLET, FILM COATED ORAL at 06:23

## 2022-06-19 RX ADMIN — TACROLIMUS 1 MG: 1 CAPSULE ORAL at 06:24

## 2022-06-19 RX ADMIN — TACROLIMUS 1 MG: 1 CAPSULE ORAL at 17:20

## 2022-06-19 RX ADMIN — DAKIN'S SOLUTION 0.125% (QUARTER STRENGTH) 50 ML: 0.12 SOLUTION at 03:00

## 2022-06-19 RX ADMIN — MYCOPHENOLATE MOFETIL 500 MG: 250 CAPSULE ORAL at 06:23

## 2022-06-19 RX ADMIN — DAKIN'S SOLUTION 0.125% (QUARTER STRENGTH) 473 ML: 0.12 SOLUTION at 16:16

## 2022-06-19 ASSESSMENT — PATIENT HEALTH QUESTIONNAIRE - PHQ9
SUM OF ALL RESPONSES TO PHQ9 QUESTIONS 1 AND 2: 0
2. FEELING DOWN, DEPRESSED, IRRITABLE, OR HOPELESS: NOT AT ALL
1. LITTLE INTEREST OR PLEASURE IN DOING THINGS: NOT AT ALL

## 2022-06-19 NOTE — CARE PLAN
The patient is Stable - Low risk of patient condition declining or worsening    Shift Goals  Clinical Goals: wound care  Patient Goals: rest, family visit  Family Goals: n/a    Progress made toward(s) clinical / shift goals:    Problem: Knowledge Deficit - Standard  Goal: Patient and family/care givers will demonstrate understanding of plan of care, disease process/condition, diagnostic tests and medications  Outcome: Progressing     Problem: Pain - Standard  Goal: Alleviation of pain or a reduction in pain to the patient’s comfort goal  Outcome: Progressing     Problem: Skin Integrity  Goal: Skin integrity is maintained or improved  Outcome: Progressing     Problem: Fall Risk  Goal: Patient will remain free from falls  Outcome: Progressing       Patient is not progressing towards the following goals:

## 2022-06-19 NOTE — PROGRESS NOTES
Hospital Medicine Daily Progress Note    Date of Service  6/19/2022    Chief Complaint  Jama Altman is a 66 y.o. male admitted 5/24/2022 with AMS and fall    Hospital Course  66 y.o. male w/ HTN, DLD, diabetes,  polycystic kidney dz with a renal transplant 9/10/10 and on immunosuppression but has CKD, ADELFO who presented 5/24/2022 with altered mentation.  There was initial concern that he had fallen at home and struck his head.  In the ER he was in atrial fibrillation with a rapid rate of 120-160.  He was given a fluid bolus and initiated on norepinephrine.  He was admitted for severe septic shock likely related to a soft tissue infection of the right leg from where he bumped into something several days ago. During admit he was found to have E.coli bacteremia and right leg cellulitis.  Spinal fluid ws negative for meningitis.  On 5/26 the patient had respiratory distress and was intubated. On 5/28 the patient had PEA arrest and had CPR.  A right knee aspiration was performed and fluid analysis was not showing infection.  On 6/1 the patient was extubated.  The patient had CRRT initiated on 5/27 and daily HD on 5/28 per nephrology notes. Patient shows sign of recovery and has been off dialysis for a few days.  E. coli bacteremia, source likely RLE cellulitis. ID consulted.  Completed iv meropenem with stop date 6/6/2022.     Family meeting was held on 6/17 at bedside per the son's request.  All questions were answered.  Care plan was discussed and updated with the plan son.  He is agreeable.     Interval Problem Update  Patient was seen and examined at bedside.  Denies nausea vomiting or pain.      Cre is back to his baseline 1.5s,  Dc IVF. Continue to monitor.    Angiogram done by Dr. Lopez, good perfusion and no need for revasculariztion. Will reconsult plastic surgeon.       Patient reported diarrhea with stool softener.  Change Senokot to as needed    Respiratory distress resolved, off oxygen    PT/OT  recommend postacute  Not a candidate for inpatient rehab   Pending SNF    Consultants/Specialty  Infectious disease  Critical care medicine  Nephrology    Code Status  Full Code    Disposition  SNF    Review of Systems  All systems reviewed and negative except as noted per above.    Physical Exam  Temp:  [36.2 °C (97.2 °F)-36.6 °C (97.9 °F)] 36.6 °C (97.9 °F)  Pulse:  [66-95] 83  Resp:  [17-19] 17  BP: ()/(51-77) 94/62  SpO2:  [94 %-97 %] 97 %    General appearance: NAD, conversant, family at bedside  Eyes: anicteric sclerae, moist conjunctivae; no lid-lag; PERRLA, noted subconjunctival hemorrhage, pronounce on R eye   HENT: Atraumatic; oropharynx clear with moist mucous membranes and no mucosal ulcerations; normal hard and soft palate  Neck: Trachea midline; FROM, supple, no thyromegaly or lymphadenopathy  Lungs: CTA, with normal respiratory effort and no intercostal retractions  CV: RRR, no MRGs  Abdomen: Soft, non-tender; no masses or HSM  Extremities: RLE erythema with clean dressing  Skin: Normal temperature, turgor and texture; no rash, ulcers or subcutaneous nodules  Psych: Answering questions      Current Facility-Administered Medications:   •  senna-docusate (PERICOLACE or SENOKOT S) 8.6-50 MG per tablet 2 Tablet, 2 Tablet, Oral, BID PRN **AND** polyethylene glycol/lytes (MIRALAX) PACKET 1 Packet, 1 Packet, Oral, QDAY PRN **AND** [DISCONTINUED] magnesium hydroxide (MILK OF MAGNESIA) suspension 30 mL, 30 mL, Oral, QDAY PRN **AND** bisacodyl (DULCOLAX) suppository 10 mg, 10 mg, Rectal, QDAY PRN, Suzi Gandara M.D.  •  insulin GLARGINE (Lantus,Semglee) injection, 15 Units, Subcutaneous, BID, Timo Muñoz M.D., 15 Units at 06/19/22 0625  •  metoprolol tartrate (LOPRESSOR) tablet 12.5 mg, 12.5 mg, Oral, TWICE DAILY, Timo Muñoz M.D., 12.5 mg at 06/19/22 0623  •  patiromer (VELTASSA) powder for oral suspension 8.4 g, 8.4 g, Oral, DAILY, Roderick Ramirez M.D., 8.4 g at 06/19/22 0826  •  insulin regular (HumuLIN  R,NovoLIN R) injection, 3-14 Units, Subcutaneous, 4X/DAY ACHS, 3 Units at 06/18/22 2049 **AND** POC blood glucose manual result, , , Q AC AND BEDTIME(S) **AND** NOTIFY MD and PharmD, , , Once **AND** Administer 20 grams of glucose (approximately 8 ounces of fruit juice) every 15 minutes PRN FSBG less than 70 mg/dL, , , PRN **AND** dextrose 50% (D50W) injection 25 g, 25 g, Intravenous, Q15 MIN PRN, Linsey M Wegener, A.P.R.N.  •  acetaminophen (Tylenol) tablet 650 mg, 650 mg, Oral, Q6HRS PRN, Mehrdad Laughlin M.D., 650 mg at 06/18/22 1439  •  tacrolimus (PROGRAF) capsule 1 mg, 1 mg, Oral, BID, Mehrdad Laughlin M.D., 1 mg at 06/19/22 0624  •  mycophenolate (CELLCEPT) capsule 500 mg, 500 mg, Oral, BID, Mehrdad Laughlin M.D., 500 mg at 06/19/22 0623  •  oxyCODONE immediate-release (ROXICODONE) tablet 5 mg, 5 mg, Oral, Q4HRS PRN, Mehrdad Laughlin M.D.  •  predniSONE (DELTASONE) tablet 5 mg, 5 mg, Oral, DAILY, Mehrdad Laughlin M.D., 5 mg at 06/19/22 0624  •  heparin injection 5,000 Units, 5,000 Units, Subcutaneous, Q12HRS, James Titus M.D., 5,000 Units at 06/19/22 0623  •  heparin injection 2,800 Units, 2,800 Units, Intracatheter, DIALYSIS PRN, James Sheppard M.D., 2,800 Units at 06/02/22 1400  •  Respiratory Therapy Consult, , Nebulization, Continuous RT, Reuben Swartz M.D.  •  sodium chloride (OCEAN) 0.65 % nasal spray 2 Spray, 2 Spray, Nasal, Q2HRS PRN, Kayy Hopkins M.D., 2 [place] at 06/14/22 1742  •  dakins 0.125% (1/4 strength) topical soln, , Topical, BID, Suzi Gandara M.D., 50 mL at 06/19/22 0300  •  HYDROmorphone (Dilaudid) injection 0.5 mg, 0.5 mg, Intravenous, Q2HRS PRN, Tracey Whitfield M.D., 0.5 mg at 05/31/22 0059      Fluids    Intake/Output Summary (Last 24 hours) at 6/19/2022 1358  Last data filed at 6/19/2022 0900  Gross per 24 hour   Intake 840 ml   Output 1900 ml   Net -1060 ml       Laboratory      Recent Labs     06/17/22  0308 06/18/22  0022 06/19/22  0017   SODIUM 136 137 136   POTASSIUM 5.1 4.7 4.3    CHLORIDE 102 105 105   CO2 23 22 21   GLUCOSE 112* 136* 102*   BUN 33* 33* 29*   CREATININE 1.73* 1.48* 1.41*   CALCIUM 8.6 8.1* 8.2*                   Imaging  DX-CHEST-PORTABLE (1 VIEW)   Final Result      1.  Interval improvement in bilateral pulmonary opacities, likely improving edema.   2.  Bilateral infrahilar atelectasis versus consolidations persist. No effusions.         CT-HEAD W/O   Final Result      1.  Cerebral atrophy.      2.  Bilateral mastoid effusions.      3.  Otherwise, Head CT without contrast within normal limits. No evidence of acute intracranial hemorrhage or mass lesion.         DX-ABDOMEN FOR TUBE PLACEMENT   Final Result      Enteric feeding tube terminates with the tip projecting over the expected location of the 2nd-3rd portion of the duodenum.      DX-CHEST-LIMITED (1 VIEW)   Final Result      1.  Bibasilar underinflation atelectasis which could obscure an additional process. This is unchanged.   2.  Interstitial opacities likely pulmonary edema   3.  Persistently enlarged cardiac silhouette      DX-CHEST-LIMITED (1 VIEW)   Final Result      1.  Hypoinflation with mildly increased left basilar atelectasis.   2.  Removal of endotracheal tube.      DX-CHEST-LIMITED (1 VIEW)   Final Result         No significant interval change.      DX-CHEST-LIMITED (1 VIEW)   Final Result      Stable areas of patchy atelectasis/consolidation.      DX-CHEST-PORTABLE (1 VIEW)   Final Result      Stable chest x-ray findings.      DX-CHEST-PORTABLE (1 VIEW)   Final Result         1.  Pulmonary edema and/or infiltrates, somewhat increased particularly in the right lung base compared to prior study.   2.  Cardiomegaly      CT-ABDOMEN-PELVIS W/O   Final Result         Limited noncontrast exam      1. Long segment wall thickening from the descending colon to the sigmoid colon could relate to underdistention or colitis. Air-fluid level in the more proximal colon is likely diarrheal disease.      2. Right lower  quadrant transplant kidney with retained prior contrast, likely secondary to renal failure/ATN/contrast nephropathy. No hydronephrosis.      Absent right kidney. Polycystic left kidney.      CT-EXTREMITY, LOWER W/O RIGHT   Final Result      1. Postsurgical changes of right total knee arthroplasty.   2. Right knee joint effusion with thickening of the joint capsule and surrounding soft tissue edema.   3. Circumferential edema involving the soft tissues of the right lower leg with an anterior soft tissue defect and with areas of clustering on the medial right lower leg skin surface.   4. Arteriosclerosis.      DX-CHEST-PORTABLE (1 VIEW)   Final Result      1.  Unchanged position of supporting devices are visualized extent   2.  No visible pneumothorax following chest compressions   3.  Unchanged atelectasis and possible superimposed interstitial pulmonary edema or atypical pneumonia      US-EXTREMITY ARTERY LOWER UNILAT RIGHT   Final Result      US-MIRELLA SINGLE LEVEL BILAT   Final Result      US-EXTREMITY ARTERY LOWER BILAT W/MIRELLA (COMBO)   Final Result      DX-CHEST-PORTABLE (1 VIEW)   Final Result         1.  Pulmonary edema and/or infiltrates, somewhat increased particularly in the right lung base compared to prior study.   2.  Cardiomegaly      DX-ABDOMEN FOR TUBE PLACEMENT   Final Result      1. The tip of the feeding tube terminates over the junction of the gastric pylorus and duodenal bulb.   2. The remainder is stable.      DX-CHEST-PORTABLE (1 VIEW)   Final Result      1. Interval decrease in size of the right pleural effusion.   2. No left pleural effusion.   3. No visible pneumothorax status post bronchoscopy.   4. Interval placement of a Dobbhoff feeding tube.   5. Improving parenchymal loss in the right lung base, incompletely resolved.   6. The remainder is stable.      DX-CHEST-PORTABLE (1 VIEW)   Final Result      1. Interval development of a moderate right pleural effusion after placement of a left  internal jugular dialysis catheter, abutting the right lateral wall of the right atrium. Verification of line position with contrast injection under fluoroscopy is    offered.   2. Improved perihilar atelectasis.   3. The remainder is stable.      I, Dr. Matthew Brown, discussed the results of this examination directly by phone with Dr. Reuben Swartz on 5/26/2022 at 0855 hours.      EC-ECHOCARDIOGRAM COMPLETE W/ CONT   Final Result      DX-CHEST-LIMITED (1 VIEW)   Final Result         1. Right internal jugular central venous access catheter placed in the interval terminates over the upper aspect of the right atrium. No postprocedure visible pneumothorax.   2. Stable patchy parenchymal opacities in the lungs, with a stable small left pleural effusion.      DX-CHEST-PORTABLE (1 VIEW)   Final Result         1.  Pulmonary edema and/or infiltrates are identified, which are stable since the prior exam.   2.  Trace bilateral pleural effusions   3.  Cardiomegaly      CT-EXTREMITY, LOWER W/O RIGHT   Final Result      1.  Status post right total knee replacement.      2.  Diffuse atherosclerotic calcification of the arterial system of the right lower extremity suspicious for diabetes mellitus.      3.  Soft tissue attenuation in the subcutaneous tissues of the mid to distal lower leg and foot suspicious for cellulitis.      4.  No evidence of soft tissue ulceration in the right lower leg or foot.      5.  No evidence of acute osteomyelitis in the right lower leg or foot.      6.  Small subcutaneous abscesses cannot be excluded without the use of intravenous contrast.      DX-CHEST-PORTABLE (1 VIEW)   Final Result      Left internal jugular central venous access catheter terminates over a persistent left superior vena cava. No postprocedure visible pneumothorax.      The remainder is stable.      DX-TIBIA AND FIBULA RIGHT   Final Result      1. Right lower leg soft tissue swelling.   2. No acute fracture or subluxation.    3. Postsurgical changes of right total knee arthroplasty.      US-ABDOMEN F.A.S.T. LTD (FOR ED USE ONLY)   Final Result      No free fluid seen in all 4 quadrants.      Negative FAST scan.            CT-ABDOMEN-PELVIS WITH   Final Result      1. No acute posttraumatic findings in the abdomen or pelvis.   2. Bibasilar subsegmental atelectasis.   3. Right pelvic transplant kidney without hydronephrosis.   4. Polycystic left kidney.   5. Diverticulosis without diverticulitis. Normal appendix.      CT-CTA CHEST PULMONARY ARTERY W/ RECONS   Final Result      1.  No CT evidence of pulmonary emboli.      2.  Bibasilar atelectasis.      3.  No evidence of rib fracture.      4. Diffuse coronary artery calcification.      CT-HEAD W/O   Final Result      1.  Cerebral atrophy.      2.  Otherwise, Head CT without contrast within normal limits. No evidence of acute cerebral infarction, hemorrhage or mass lesion.         CT-CSPINE WITHOUT PLUS RECONS   Final Result      1.  Moderate osteoarthritic changes at the C6-7 level with disc space narrowing and marginal osteophytosis. Further there is moderate cervical spondylotic changes at this level.      2.  No evidence of cervical spine fracture and/or subluxation.      DX-PELVIS-1 OR 2 VIEWS   Final Result      1.  Unremarkable single AP view of the pelvis.      DX-CHEST-LIMITED (1 VIEW)   Final Result      1.  Curvilinear perihilar opacities likely atelectasis and/or parenchymal scarring.      2.  Mild cardiomegaly.      IR-EXTREMITY ANGIOGRAM-UNILATERAL RIGHT    (Results Pending)        Assessment/Plan  * Acute renal failure superimposed on stage 3a chronic kidney disease (HCC)  Assessment & Plan  Nephrology consulting  Hemodialysis as per nephrology. Has been off HD as renal function improving, dialysis catheter has been removed  On IV fluid  Monitor vitals, I/O's, labs  Avoid nephrotoxins.  Immunosuppression for hx of renal transplant    Improving    Non-healing wound of right  lower extremity  Assessment & Plan  RLE Arterial ultrasound 5/2022 showed mild arterial insufficiency, no significant stenosis or occlusion   S/p debridement 5/24, 6/16 by wound care  Wound care following, recommended plastic surgeon consult  I talked to plastic surgeon , vascular surgery evaluation prior to any surgery.     Vascular surgery consulted, Dr. Lopez plan to have angiogram.                 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    Bacteremia due to Escherichia coli- (present on admission)  Assessment & Plan  Blood culture pos E coli on 5/24. Repeated blood culture negative to date. Source likely sec to RLE cellulitis  Completed 10 days course of IV meropenem (started on 5/28 given new fever and worsening chest x-ray) on 6/6  ID consulted     Acute respiratory failure with hypoxia ([facility])  Assessment & Plan  Intubated 5/26 and extubated 6/1  Mobilize as able  Respiratory protocol  Supplemental oxygen and wean as able      Knee effusion, right  Assessment & Plan  s/p bedside right knee joint aspiration on 5/28.  Synovial fluid analysis reveals hazy priscila fluid, 2638 WBCs with PMN predominance and no crystals seen.  Fluid not consistent with infection.  Culture neg    Cardiac arrest ([facility])  Assessment & Plan  S/p PEA  Monitor labs, vitals.    Acute metabolic encephalopathy  Assessment & Plan  Improving.  Monitor neurochecks  Treating infection as likely etiology    Resolved    Acute on chronic systolic heart failure (HCC)- (present on admission)  Assessment & Plan  With most recent ejection fraction 35%  Be conservative with IV fluid  On beta-blocker, ACEI on hold due to  SANKET    Continue to monitor      PAF (paroxysmal atrial fibrillation) (HCC)- (present on admission)  Assessment & Plan  Hx of pAFib , not on OAC at home.   Outpatient metoprolol 25mg daily on hold due to septic shock and borderline hypotension.  Resumed metoprolol 12.5 mg twice daily on 6/10 with holding parameters  Outpatient cardiology/PCP follow up regards OAC use.     Type 2 diabetes mellitus with hyperlipidemia (HCC)- (present on admission)  Assessment & Plan  On lantus and SSI. Holding home po diabetes meds  Hypoglycemic protocol   A1c 10.7    Thrombocytopenia (HCC)- (present on admission)  Assessment & Plan  Resolved     Hyperkalemia  Assessment & Plan  Low potassium diet  On [place]  Continue to monitor    Resolved    Septic shock (HCC)- (present on admission)  Assessment & Plan  Sec to E.coli bacteremia from RLE cellulitis  Completed abx   Resolved       VTE prophylaxis: heparin ppx    I have performed a physical exam and reviewed and updated ROS and Plan today (6/19/2022). In review of yesterday's note (6/18/2022), there are no changes except as documented above  Hospital Medicine Daily Progress Note    Date of Service  6/19/2022    Chief Complaint  Jama Altman is a 66 y.o. male admitted 5/24/2022 with AMS and fall    Hospital Course  66 y.o. male w/ HTN, DLD, diabetes,  polycystic kidney dz with a renal transplant 9/10/10 and on immunosuppression but has CKD, ADELFO who presented 5/24/2022 with altered mentation.  There was initial concern that he had fallen at home and struck his head.  In the ER he was in atrial fibrillation with a rapid rate of 120-160.  He was given a fluid bolus and initiated on norepinephrine.  He was admitted for severe septic shock likely related to a soft tissue infection of the right leg from where he bumped into something several days ago. During admit he was found to have E.coli bacteremia and right leg cellulitis.  Spinal fluid ws negative for meningitis.  On 5/26 the  patient had respiratory distress and was intubated. On 5/28 the patient had PEA arrest and had CPR.  A right knee aspiration was performed and fluid analysis was not showing infection.  On 6/1 the patient was extubated.  The patient had CRRT initiated on 5/27 and daily HD on 5/28 per nephrology notes. Patient shows sign of recovery and has been off dialysis for a few days.  E. coli bacteremia, source likely RLE cellulitis. ID consulted.  Completed iv meropenem with stop date 6/6/2022.     Interval Problem Update  Patient was seen and examined at bedside.  Denies nausea vomiting or pain.     Pending wound care recommendation    Patient reported diarrhea with stool softener.  Change Senokot to as needed    SANKET resolved, baseline at 1.5s, off IV fluid  Respiratory distress resolved, off oxygen    PT/OT recommend postacute  Not a candidate for inpatient rehab   Pending SNF    Consultants/Specialty  Infectious disease  Critical care medicine  Nephrology    Code Status  Full Code    Disposition  SNF    Review of Systems  All systems reviewed and negative except as noted per above.    Physical Exam  Temp:  [36.2 °C (97.2 °F)-36.6 °C (97.9 °F)] 36.6 °C (97.9 °F)  Pulse:  [66-95] 83  Resp:  [17-19] 17  BP: ()/(51-77) 94/62  SpO2:  [94 %-97 %] 97 %    General appearance: NAD, conversant, family at bedside  Eyes: anicteric sclerae, moist conjunctivae; no lid-lag; PERRLA, noted subconjunctival hemorrhage, pronounce on R eye   HENT: Atraumatic; oropharynx clear with moist mucous membranes and no mucosal ulcerations; normal hard and soft palate  Neck: Trachea midline; FROM, supple, no thyromegaly or lymphadenopathy  Lungs: CTA, with normal respiratory effort and no intercostal retractions  CV: RRR, no MRGs  Abdomen: Soft, non-tender; no masses or HSM  Extremities: RLE erythema with clean dressing  Skin: Normal temperature, turgor and texture; no rash, ulcers or subcutaneous nodules  Psych: Answering questions      Current  Facility-Administered Medications:   •  senna-docusate (PERICOLACE or SENOKOT S) 8.6-50 MG per tablet 2 Tablet, 2 Tablet, Oral, BID PRN **AND** polyethylene glycol/lytes (MIRALAX) PACKET 1 Packet, 1 Packet, Oral, QDAY PRN **AND** [DISCONTINUED] magnesium hydroxide (MILK OF MAGNESIA) suspension 30 mL, 30 mL, Oral, QDAY PRN **AND** bisacodyl (DULCOLAX) suppository 10 mg, 10 mg, Rectal, QDAY PRN, Suzi Gandara M.D.  •  insulin GLARGINE (Lantus,Semglee) injection, 15 Units, Subcutaneous, BID, Timo Muñoz M.D., 15 Units at 06/19/22 0625  •  metoprolol tartrate (LOPRESSOR) tablet 12.5 mg, 12.5 mg, Oral, TWICE DAILY, Timo Muñoz M.D., 12.5 mg at 06/19/22 0623  •  patiromer (VELTASSA) powder for oral suspension 8.4 g, 8.4 g, Oral, DAILY, Roderick Ramirez M.D., 8.4 g at 06/19/22 0826  •  insulin regular (HumuLIN R,NovoLIN R) injection, 3-14 Units, Subcutaneous, 4X/DAY ACHS, 3 Units at 06/18/22 2049 **AND** POC blood glucose manual result, , , Q AC AND BEDTIME(S) **AND** NOTIFY MD and PharmD, , , Once **AND** Administer 20 grams of glucose (approximately 8 ounces of fruit juice) every 15 minutes PRN FSBG less than 70 mg/dL, , , PRN **AND** dextrose 50% (D50W) injection 25 g, 25 g, Intravenous, Q15 MIN PRN, Linsey M Wegener, GENEVA.P.R.N.  •  acetaminophen (Tylenol) tablet 650 mg, 650 mg, Oral, Q6HRS PRN, Mehrdad Laughlin M.D., 650 mg at 06/18/22 1439  •  tacrolimus (PROGRAF) capsule 1 mg, 1 mg, Oral, BID, Mehrdad Laughlin M.D., 1 mg at 06/19/22 0624  •  mycophenolate (CELLCEPT) capsule 500 mg, 500 mg, Oral, BID, Mehrdad Laughlin M.D., 500 mg at 06/19/22 0623  •  oxyCODONE immediate-release (ROXICODONE) tablet 5 mg, 5 mg, Oral, Q4HRS PRN, Mehrdad Laughlin M.D.  •  predniSONE (DELTASONE) tablet 5 mg, 5 mg, Oral, DAILY, Mehrdad Laughlin M.D., 5 mg at 06/19/22 0624  •  heparin injection 5,000 Units, 5,000 Units, Subcutaneous, Q12HRS, James Titus M.D., 5,000 Units at 06/19/22 0623  •  heparin injection 2,800 Units, 2,800 Units, Intracatheter,  DIALYSIS PRN, James Sheppard M.D., 2,800 Units at 06/02/22 1400  •  Respiratory Therapy Consult, , Nebulization, Continuous RT, Reuben Swartz M.D.  •  sodium chloride (OCEAN) 0.65 % nasal spray 2 Spray, 2 Spray, Nasal, Q2HRS PRN, Kayy Hopkins M.D., 2 [place] at 06/14/22 1742  •  dakins 0.125% (1/4 strength) topical soln, , Topical, BID, Suzi Gandara M.D., 50 mL at 06/19/22 0300  •  HYDROmorphone (Dilaudid) injection 0.5 mg, 0.5 mg, Intravenous, Q2HRS PRN, Tracey Whitfield M.D., 0.5 mg at 05/31/22 0059      Fluids    Intake/Output Summary (Last 24 hours) at 6/19/2022 1358  Last data filed at 6/19/2022 0900  Gross per 24 hour   Intake 840 ml   Output 1900 ml   Net -1060 ml       Laboratory      Recent Labs     06/17/22  0308 06/18/22  0022 06/19/22  0017   SODIUM 136 137 136   POTASSIUM 5.1 4.7 4.3   CHLORIDE 102 105 105   CO2 23 22 21   GLUCOSE 112* 136* 102*   BUN 33* 33* 29*   CREATININE 1.73* 1.48* 1.41*   CALCIUM 8.6 8.1* 8.2*                   Imaging  DX-CHEST-PORTABLE (1 VIEW)   Final Result      1.  Interval improvement in bilateral pulmonary opacities, likely improving edema.   2.  Bilateral infrahilar atelectasis versus consolidations persist. No effusions.         CT-HEAD W/O   Final Result      1.  Cerebral atrophy.      2.  Bilateral mastoid effusions.      3.  Otherwise, Head CT without contrast within normal limits. No evidence of acute intracranial hemorrhage or mass lesion.         DX-ABDOMEN FOR TUBE PLACEMENT   Final Result      Enteric feeding tube terminates with the tip projecting over the expected location of the 2nd-3rd portion of the duodenum.      DX-CHEST-LIMITED (1 VIEW)   Final Result      1.  Bibasilar underinflation atelectasis which could obscure an additional process. This is unchanged.   2.  Interstitial opacities likely pulmonary edema   3.  Persistently enlarged cardiac silhouette      DX-CHEST-LIMITED (1 VIEW)   Final Result      1.  Hypoinflation with mildly increased left  basilar atelectasis.   2.  Removal of endotracheal tube.      DX-CHEST-LIMITED (1 VIEW)   Final Result         No significant interval change.      DX-CHEST-LIMITED (1 VIEW)   Final Result      Stable areas of patchy atelectasis/consolidation.      DX-CHEST-PORTABLE (1 VIEW)   Final Result      Stable chest x-ray findings.      DX-CHEST-PORTABLE (1 VIEW)   Final Result         1.  Pulmonary edema and/or infiltrates, somewhat increased particularly in the right lung base compared to prior study.   2.  Cardiomegaly      CT-ABDOMEN-PELVIS W/O   Final Result         Limited noncontrast exam      1. Long segment wall thickening from the descending colon to the sigmoid colon could relate to underdistention or colitis. Air-fluid level in the more proximal colon is likely diarrheal disease.      2. Right lower quadrant transplant kidney with retained prior contrast, likely secondary to renal failure/ATN/contrast nephropathy. No hydronephrosis.      Absent right kidney. Polycystic left kidney.      CT-EXTREMITY, LOWER W/O RIGHT   Final Result      1. Postsurgical changes of right total knee arthroplasty.   2. Right knee joint effusion with thickening of the joint capsule and surrounding soft tissue edema.   3. Circumferential edema involving the soft tissues of the right lower leg with an anterior soft tissue defect and with areas of clustering on the medial right lower leg skin surface.   4. Arteriosclerosis.      DX-CHEST-PORTABLE (1 VIEW)   Final Result      1.  Unchanged position of supporting devices are visualized extent   2.  No visible pneumothorax following chest compressions   3.  Unchanged atelectasis and possible superimposed interstitial pulmonary edema or atypical pneumonia      US-EXTREMITY ARTERY LOWER UNILAT RIGHT   Final Result      US-MIRELLA SINGLE LEVEL BILAT   Final Result      US-EXTREMITY ARTERY LOWER BILAT W/MIRELLA (COMBO)   Final Result      DX-CHEST-PORTABLE (1 VIEW)   Final Result         1.  Pulmonary  edema and/or infiltrates, somewhat increased particularly in the right lung base compared to prior study.   2.  Cardiomegaly      DX-ABDOMEN FOR TUBE PLACEMENT   Final Result      1. The tip of the feeding tube terminates over the junction of the gastric pylorus and duodenal bulb.   2. The remainder is stable.      DX-CHEST-PORTABLE (1 VIEW)   Final Result      1. Interval decrease in size of the right pleural effusion.   2. No left pleural effusion.   3. No visible pneumothorax status post bronchoscopy.   4. Interval placement of a Dobbhoff feeding tube.   5. Improving parenchymal loss in the right lung base, incompletely resolved.   6. The remainder is stable.      DX-CHEST-PORTABLE (1 VIEW)   Final Result      1. Interval development of a moderate right pleural effusion after placement of a left internal jugular dialysis catheter, abutting the right lateral wall of the right atrium. Verification of line position with contrast injection under fluoroscopy is    offered.   2. Improved perihilar atelectasis.   3. The remainder is stable.      I, Dr. Matthew Brown, discussed the results of this examination directly by phone with Dr. Reuben Swartz on 5/26/2022 at 0855 hours.      EC-ECHOCARDIOGRAM COMPLETE W/ CONT   Final Result      DX-CHEST-LIMITED (1 VIEW)   Final Result         1. Right internal jugular central venous access catheter placed in the interval terminates over the upper aspect of the right atrium. No postprocedure visible pneumothorax.   2. Stable patchy parenchymal opacities in the lungs, with a stable small left pleural effusion.      DX-CHEST-PORTABLE (1 VIEW)   Final Result         1.  Pulmonary edema and/or infiltrates are identified, which are stable since the prior exam.   2.  Trace bilateral pleural effusions   3.  Cardiomegaly      CT-EXTREMITY, LOWER W/O RIGHT   Final Result      1.  Status post right total knee replacement.      2.  Diffuse atherosclerotic calcification of the arterial  system of the right lower extremity suspicious for diabetes mellitus.      3.  Soft tissue attenuation in the subcutaneous tissues of the mid to distal lower leg and foot suspicious for cellulitis.      4.  No evidence of soft tissue ulceration in the right lower leg or foot.      5.  No evidence of acute osteomyelitis in the right lower leg or foot.      6.  Small subcutaneous abscesses cannot be excluded without the use of intravenous contrast.      DX-CHEST-PORTABLE (1 VIEW)   Final Result      Left internal jugular central venous access catheter terminates over a persistent left superior vena cava. No postprocedure visible pneumothorax.      The remainder is stable.      DX-TIBIA AND FIBULA RIGHT   Final Result      1. Right lower leg soft tissue swelling.   2. No acute fracture or subluxation.   3. Postsurgical changes of right total knee arthroplasty.      US-ABDOMEN F.A.S.T. LTD (FOR ED USE ONLY)   Final Result      No free fluid seen in all 4 quadrants.      Negative FAST scan.            CT-ABDOMEN-PELVIS WITH   Final Result      1. No acute posttraumatic findings in the abdomen or pelvis.   2. Bibasilar subsegmental atelectasis.   3. Right pelvic transplant kidney without hydronephrosis.   4. Polycystic left kidney.   5. Diverticulosis without diverticulitis. Normal appendix.      CT-CTA CHEST PULMONARY ARTERY W/ RECONS   Final Result      1.  No CT evidence of pulmonary emboli.      2.  Bibasilar atelectasis.      3.  No evidence of rib fracture.      4. Diffuse coronary artery calcification.      CT-HEAD W/O   Final Result      1.  Cerebral atrophy.      2.  Otherwise, Head CT without contrast within normal limits. No evidence of acute cerebral infarction, hemorrhage or mass lesion.         CT-CSPINE WITHOUT PLUS RECONS   Final Result      1.  Moderate osteoarthritic changes at the C6-7 level with disc space narrowing and marginal osteophytosis. Further there is moderate cervical spondylotic changes at  this level.      2.  No evidence of cervical spine fracture and/or subluxation.      DX-PELVIS-1 OR 2 VIEWS   Final Result      1.  Unremarkable single AP view of the pelvis.      DX-CHEST-LIMITED (1 VIEW)   Final Result      1.  Curvilinear perihilar opacities likely atelectasis and/or parenchymal scarring.      2.  Mild cardiomegaly.      IR-EXTREMITY ANGIOGRAM-UNILATERAL RIGHT    (Results Pending)        Assessment/Plan  * Acute renal failure superimposed on stage 3a chronic kidney disease (HCC)  Assessment & Plan  Nephrology consulting  Hemodialysis as per nephrology. Has been off HD as renal function improving, dialysis catheter has been removed  On IV fluid  Monitor vitals, I/O's, labs  Avoid nephrotoxins.  Immunosuppression for hx of renal transplant    Improving    Non-healing wound of right lower extremity  Assessment & Plan  RLE Arterial ultrasound 5/2022 showed mild arterial insufficiency, no significant stenosis or occlusion   S/p debridement 5/24, 6/16 by wound care  Wound care following, recommended plastic surgeon consult  I talked to plastic surgeon , vascular surgery evaluation prior to any surgery.     Vascular surgery consulted, Dr. Lopez plan to have angiogram.                 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    Bacteremia due to Escherichia coli- (present on admission)  Assessment & Plan  Blood culture pos E coli on 5/24. Repeated blood culture negative to date. Source likely sec to RLE cellulitis  Completed 10 days course of IV meropenem (started on 5/28 given new fever and worsening chest x-ray) on 6/6  ID consulted     Acute respiratory failure  with hypoxia (HCC)  Assessment & Plan  Intubated 5/26 and extubated 6/1  Mobilize as able  Respiratory protocol  Supplemental oxygen and wean as able      Knee effusion, right  Assessment & Plan  s/p bedside right knee joint aspiration on 5/28.  Synovial fluid analysis reveals hazy priscila fluid, 2638 WBCs with PMN predominance and no crystals seen.  Fluid not consistent with infection.  Culture neg    Cardiac arrest ([facility])  Assessment & Plan  S/p PEA  Monitor labs, vitals.    Acute metabolic encephalopathy  Assessment & Plan  Improving.  Monitor neurochecks  Treating infection as likely etiology    Resolved    Acute on chronic systolic heart failure (HCC)- (present on admission)  Assessment & Plan  With most recent ejection fraction 35%  Be conservative with IV fluid  On beta-blocker, ACEI on hold due to SANKET    Continue to monitor      PAF (paroxysmal atrial fibrillation) ([facility])- (present on admission)  Assessment & Plan  Hx of pAFib , not on OAC at home.   Outpatient metoprolol 25mg daily on hold due to septic shock and borderline hypotension.  Resumed metoprolol 12.5 mg twice daily on 6/10 with holding parameters  Outpatient cardiology/PCP follow up regards OAC use.     Type 2 diabetes mellitus with hyperlipidemia (HCC)- (present on admission)  Assessment & Plan  On lantus and SSI. Holding home po diabetes meds  Hypoglycemic protocol   A1c 10.7    Thrombocytopenia ([facility])- (present on admission)  Assessment & Plan  Resolved     Hyperkalemia  Assessment & Plan  Low potassium diet  On Veltassa  Continue to monitor    Resolved    Septic shock (HCC)- (present on admission)  Assessment & Plan  Sec to E.coli bacteremia from RLE cellulitis  Completed abx   Resolved       VTE prophylaxis: heparin ppx    I have performed a physical exam and reviewed and updated ROS and Plan today (6/19/2022). In review of yesterday's note (6/18/2022), there are no changes except as documented above

## 2022-06-20 LAB
ANION GAP SERPL CALC-SCNC: 9 MMOL/L (ref 7–16)
BUN SERPL-MCNC: 26 MG/DL (ref 8–22)
CALCIUM SERPL-MCNC: 8.1 MG/DL (ref 8.5–10.5)
CHLORIDE SERPL-SCNC: 103 MMOL/L (ref 96–112)
CO2 SERPL-SCNC: 23 MMOL/L (ref 20–33)
CREAT SERPL-MCNC: 1.42 MG/DL (ref 0.5–1.4)
GFR SERPLBLD CREATININE-BSD FMLA CKD-EPI: 55 ML/MIN/1.73 M 2
GLUCOSE BLD STRIP.AUTO-MCNC: 138 MG/DL (ref 65–99)
GLUCOSE BLD STRIP.AUTO-MCNC: 147 MG/DL (ref 65–99)
GLUCOSE BLD STRIP.AUTO-MCNC: 153 MG/DL (ref 65–99)
GLUCOSE BLD STRIP.AUTO-MCNC: 157 MG/DL (ref 65–99)
GLUCOSE BLD STRIP.AUTO-MCNC: 74 MG/DL (ref 65–99)
GLUCOSE SERPL-MCNC: 93 MG/DL (ref 65–99)
POTASSIUM SERPL-SCNC: 4.2 MMOL/L (ref 3.6–5.5)
SODIUM SERPL-SCNC: 135 MMOL/L (ref 135–145)

## 2022-06-20 PROCEDURE — 36415 COLL VENOUS BLD VENIPUNCTURE: CPT

## 2022-06-20 PROCEDURE — A9270 NON-COVERED ITEM OR SERVICE: HCPCS | Performed by: HOSPITALIST

## 2022-06-20 PROCEDURE — 82962 GLUCOSE BLOOD TEST: CPT | Mod: 91

## 2022-06-20 PROCEDURE — 700111 HCHG RX REV CODE 636 W/ 250 OVERRIDE (IP): Performed by: HOSPITALIST

## 2022-06-20 PROCEDURE — 700111 HCHG RX REV CODE 636 W/ 250 OVERRIDE (IP): Performed by: STUDENT IN AN ORGANIZED HEALTH CARE EDUCATION/TRAINING PROGRAM

## 2022-06-20 PROCEDURE — A9270 NON-COVERED ITEM OR SERVICE: HCPCS | Performed by: STUDENT IN AN ORGANIZED HEALTH CARE EDUCATION/TRAINING PROGRAM

## 2022-06-20 PROCEDURE — 700102 HCHG RX REV CODE 250 W/ 637 OVERRIDE(OP): Performed by: STUDENT IN AN ORGANIZED HEALTH CARE EDUCATION/TRAINING PROGRAM

## 2022-06-20 PROCEDURE — 97110 THERAPEUTIC EXERCISES: CPT

## 2022-06-20 PROCEDURE — 99232 SBSQ HOSP IP/OBS MODERATE 35: CPT | Performed by: STUDENT IN AN ORGANIZED HEALTH CARE EDUCATION/TRAINING PROGRAM

## 2022-06-20 PROCEDURE — 80048 BASIC METABOLIC PNL TOTAL CA: CPT

## 2022-06-20 PROCEDURE — 700102 HCHG RX REV CODE 250 W/ 637 OVERRIDE(OP): Performed by: HOSPITALIST

## 2022-06-20 PROCEDURE — 97530 THERAPEUTIC ACTIVITIES: CPT

## 2022-06-20 PROCEDURE — 770001 HCHG ROOM/CARE - MED/SURG/GYN PRIV*

## 2022-06-20 PROCEDURE — 700101 HCHG RX REV CODE 250: Performed by: STUDENT IN AN ORGANIZED HEALTH CARE EDUCATION/TRAINING PROGRAM

## 2022-06-20 RX ADMIN — METOPROLOL TARTRATE 12.5 MG: 25 TABLET, FILM COATED ORAL at 18:07

## 2022-06-20 RX ADMIN — PATIROMER 8.4 G: 8.4 POWDER, FOR SUSPENSION ORAL at 10:20

## 2022-06-20 RX ADMIN — DAKIN'S SOLUTION 0.125% (QUARTER STRENGTH) 1 ML: 0.12 SOLUTION at 20:57

## 2022-06-20 RX ADMIN — TACROLIMUS 1 MG: 1 CAPSULE ORAL at 18:07

## 2022-06-20 RX ADMIN — PREDNISONE 5 MG: 5 TABLET ORAL at 05:28

## 2022-06-20 RX ADMIN — METOPROLOL TARTRATE 12.5 MG: 25 TABLET, FILM COATED ORAL at 05:28

## 2022-06-20 RX ADMIN — MYCOPHENOLATE MOFETIL 500 MG: 250 CAPSULE ORAL at 18:08

## 2022-06-20 RX ADMIN — ACETAMINOPHEN 650 MG: 325 TABLET ORAL at 17:32

## 2022-06-20 RX ADMIN — MYCOPHENOLATE MOFETIL 500 MG: 250 CAPSULE ORAL at 05:28

## 2022-06-20 RX ADMIN — HEPARIN SODIUM 5000 UNITS: 5000 INJECTION, SOLUTION INTRAVENOUS; SUBCUTANEOUS at 05:28

## 2022-06-20 RX ADMIN — HEPARIN SODIUM 5000 UNITS: 5000 INJECTION, SOLUTION INTRAVENOUS; SUBCUTANEOUS at 18:07

## 2022-06-20 RX ADMIN — DAKIN'S SOLUTION 0.125% (QUARTER STRENGTH) 473 ML: 0.12 SOLUTION at 15:00

## 2022-06-20 RX ADMIN — DAKIN'S SOLUTION 0.125% (QUARTER STRENGTH) 473 ML: 0.12 SOLUTION at 14:08

## 2022-06-20 RX ADMIN — TACROLIMUS 1 MG: 1 CAPSULE ORAL at 05:28

## 2022-06-20 ASSESSMENT — FIBROSIS 4 INDEX: FIB4 SCORE: 1.97

## 2022-06-20 ASSESSMENT — COGNITIVE AND FUNCTIONAL STATUS - GENERAL
MOVING FROM LYING ON BACK TO SITTING ON SIDE OF FLAT BED: UNABLE
CLIMB 3 TO 5 STEPS WITH RAILING: TOTAL
TURNING FROM BACK TO SIDE WHILE IN FLAT BAD: A LOT
MOBILITY SCORE: 9
MOVING TO AND FROM BED TO CHAIR: UNABLE
WALKING IN HOSPITAL ROOM: A LOT
STANDING UP FROM CHAIR USING ARMS: A LOT
SUGGESTED CMS G CODE MODIFIER MOBILITY: CM

## 2022-06-20 ASSESSMENT — GAIT ASSESSMENTS
ASSISTIVE DEVICE: HAND HELD ASSIST
GAIT LEVEL OF ASSIST: MAXIMAL ASSIST
DISTANCE (FEET): 2

## 2022-06-20 NOTE — DISCHARGE PLANNING
Agency/Facility Name: [facility]  Outcome: DPA left voicemail regarding acceptance status, DPA requesting call back.     1200:  Agency[facility]Facility Name: [facility]  Spoke To: Coleen  Outcome: DPA spoke with Coleen regarding admission status, [facility] to call DPA regarding acceptance based on further investigation on medical status.     1205:  [facility]  Spoke To: Suyapa  Outcome: DPA was informed Pt was declined due to bilateral wounds, dialysis, and short time frame w/ insurance.     1215:  Agency/Facility Name: [facility]  Outcome: DPA left voicemail for Seau regarding acceptance status.     1235:  Agency/Facility Name:[facility]  Referral sent per Choice form @ 1235 per RN CM.     0230:  Agency/Facility Name: [facility]  Outcome: DPA left voicemail regarding acceptance status.      0238:   Agency/Facility Name: [facility]  Spoke To: Coleen  Outcome: DPA spoke with Coleen regarding medical status, [facility] to call DPA back regarding bed availability.     0326:  Agency[facility]Facility Name: [facility]  Outcome: DPA left Han v-mail regarding bed availability and placement.

## 2022-06-20 NOTE — OP REPORT
ACUTE CARE VASCULAR SERVICE  OPERATIVE NOTE          DATE OF SERVICE:  06/17/2022     PREOPERATIVE DIAGNOSES:  Peripheral arterial occlusive disease with right   lower extremity arterial insufficiency and nonhealing wounds of the right   lower leg and foot.     POSTOPERATIVE DIAGNOSES:  Peripheral arterial occlusive disease with right   lower extremity arterial insufficiency and nonhealing wounds of the right   lower leg and foot.     PROCEDURES PERFORMED:  1.  Percutaneous access, left common femoral artery with ultrasound guidance.  2.  Nonselective catheterization of the infrarenal aorta.  3.  Aortoiliac angiogram.  4.  Selective catheter placement in the right superficial femoral artery.  5.  Right lower extremity runoff angiography.     SURGEON:  Peter Lopez MD     ASSISTANT:  None.     ANESTHESIA:  Local plus sedation.     ESTIMATED BLOOD LOSS:  Minimal.     FINDINGS:  Aortoiliac system is widely patent.  The femoral and popliteal   vessels of the right leg are widely patent.  There is 2-vessel tibial runoff   via the peroneal and posterior tibial artery with good perfusion of the foot.    The anterior tibial artery demonstrates a long segment occlusion with no   distal reconstitution and no option for recanalization.  No vascular   intervention is indicated based on these findings.     COMPLICATIONS:  None.     DISPOSITION:  The patient tolerated the procedure well, was sent to recovery   in stable condition.     DESCRIPTION OF PROCEDURE:  Following informed consent, the patient was placed   supine on the operating table.  IV sedation was given to make the patient   comfortable.  The patient was prepped and draped sterile.  Surgical timeout   was called.     Local anesthetic was infiltrated over the left groin and then the left common   femoral artery was accessed percutaneously with ultrasound guidance.  A   4-Tamazight sheath was placed.  An Omniflush catheter was advanced to the   infrarenal aorta  and an aortoiliac angiogram was performed showing no   significant stenoses.  The catheter was advanced up over into the right   femoral artery and a right lower extremity runoff angiography was performed.    This showed no significant stenosis of the common femoral or profunda femoris   or superficial femoral arteries.  The popliteal artery was widely patent.    There is 2-vessel tibial runoff to the foot via the peroneal and posterior   tibial artery with good perfusion of the foot.  The anterior tibial artery   demonstrates a long segment occlusion without distal reconstitution.  There   are collateral vessels seen coursing across the leg over to the distribution   of the anterior tibial artery.  No vascular intervention is indicated based on   these findings.  The patient should have adequate perfusion to heal the   current leg wounds and also any potential surgery that he might need.  At this   point, the left femoral sheath was removed and the site was closed.  The   sheath was removed and manual pressure was held to achieve hemostasis and then   a dry sterile dressing was placed.  The patient tolerated the procedure well.    He was sent to recovery in stable condition.     POSTOPERATIVE PLAN:  Findings demonstrate 2-vessel runoff to the foot with   good perfusion of the foot.  Even though the patient has an anterior tibial   artery occlusion.  It is a long segment occlusion with no distal   reconstitution and therefore, there is no option for reconstructing the   anterior tibial artery.  Nevertheless, there are collateral vessels from the   peroneal and posterior tibial artery, which were seen coursing over to the   distribution of the anterior tibial artery and providing perfusion to that   area.  Based on these findings, the patient should have adequate perfusion to   heal the current wounds and any potential surgery that he might be facing.    There is no revascularization intervention indicated based on  these findings.    The patient should follow up with me as an outpatient for ongoing vascular   surveillance.        ______________________________  MD JAIMIE Stallings/ELIUD    DD:  06/19/2022 19:58  DT:  06/19/2022 22:14    Job#:  [id number]

## 2022-06-20 NOTE — PROGRESS NOTES
Assumed pt care at 0700 . Pt is A&Ox 4 . Pt denies pain.  Pt's belongings are nearby, bed is in the lowest position and locked. Hourly rounding in place.

## 2022-06-20 NOTE — PROGRESS NOTES
Hospital Medicine Daily Progress Note    Date of Service  6/20/2022    Chief Complaint  Jama Altman is a 66 y.o. male admitted 5/24/2022 with AMS and fall    Hospital Course  66 y.o. male w/ HTN, DLD, diabetes,  polycystic kidney dz with a renal transplant 9/10/10 and on immunosuppression but has CKD, ADELFO who presented 5/24/2022 with altered mentation.  There was initial concern that he had fallen at home and struck his head.  In the ER he was in atrial fibrillation with a rapid rate of 120-160.  He was given a fluid bolus and initiated on norepinephrine.  He was admitted for severe septic shock likely related to a soft tissue infection of the right leg from where he bumped into something several days ago. During admit he was found to have E.coli bacteremia and right leg cellulitis.  Spinal fluid ws negative for meningitis.  On 5/26 the patient had respiratory distress and was intubated. On 5/28 the patient had PEA arrest and had CPR.  A right knee aspiration was performed and fluid analysis was not showing infection.  On 6/1 the patient was extubated.  The patient had CRRT initiated on 5/27 and daily HD on 5/28 per nephrology notes. Patient shows sign of recovery and has been off dialysis for a few days.  E. coli bacteremia, source likely RLE cellulitis. ID consulted.  Completed iv meropenem with stop date 6/6/2022.     SANKET resolved, Cre is back to his baseline 1.5s  Respiratory distress resolved, off oxygen    Family meeting was held on 6/17 at bedside per the son's request.  All questions were answered.  Care plan was discussed and updated with the plan son.  He is agreeable.     Interval Problem Update  Patient was seen and examined at bedside.  Denies nausea vomiting or pain.       Angiogram done by Dr. Lopez, good perfusion and no need for revasculariztion.  I reconsulted vascular surgery Dr. Hercules, who evaluated the patient.     PT/OT recommend postacute  Not a candidate for inpatient rehab   Pending  SNF    Consultants/Specialty  Infectious disease  Critical care medicine  Nephrology    Code Status  Full Code    Disposition  SNF    Review of Systems  All systems reviewed and negative except as noted per above.    Physical Exam  Temp:  [36.4 °C (97.6 °F)-37.1 °C (98.7 °F)] 37.1 °C (98.7 °F)  Pulse:  [63-93] 82  Resp:  [16-18] 17  BP: (101-109)/(64-72) 101/64  SpO2:  [92 %-97 %] 95 %    General appearance: NAD, conversant, family at bedside  Eyes: anicteric sclerae, moist conjunctivae; no lid-lag; PERRLA, noted subconjunctival hemorrhage, pronounce on R eye   HENT: Atraumatic; oropharynx clear with moist mucous membranes and no mucosal ulcerations; normal hard and soft palate  Neck: Trachea midline; FROM, supple, no thyromegaly or lymphadenopathy  Lungs: CTA, with normal respiratory effort and no intercostal retractions  CV: RRR, no MRGs  Abdomen: Soft, non-tender; no masses or HSM  Extremities: RLE erythema with clean dressing  Skin: Normal temperature, turgor and texture; no rash, ulcers or subcutaneous nodules  Psych: Answering questions      Current Facility-Administered Medications:   •  senna-docusate (PERICOLACE or SENOKOT S) 8.6-50 MG per tablet 2 Tablet, 2 Tablet, Oral, BID PRN **AND** polyethylene glycol/lytes (MIRALAX) PACKET 1 Packet, 1 Packet, Oral, QDAY PRN **AND** [DISCONTINUED] magnesium hydroxide (MILK OF MAGNESIA) suspension 30 mL, 30 mL, Oral, QDAY PRN **AND** bisacodyl (DULCOLAX) suppository 10 mg, 10 mg, Rectal, QDAY PRN, Suzi Gandara M.D.  •  insulin GLARGINE (Lantus,Semglee) injection, 15 Units, Subcutaneous, BID, Timo Muñoz M.D., 15 Units at 06/20/22 0902  •  metoprolol tartrate (LOPRESSOR) tablet 12.5 mg, 12.5 mg, Oral, TWICE DAILY, Timo Muñoz M.D., 12.5 mg at 06/20/22 0528  •  insulin regular (HumuLIN R,NovoLIN R) injection, 3-14 Units, Subcutaneous, 4X/DAY ACHS, 3 Units at 06/18/22 2049 **AND** POC blood glucose manual result, , , Q AC AND BEDTIME(S) **AND** NOTIFY MD and PharmD, , ,  Once **AND** Administer 20 grams of glucose (approximately 8 ounces of fruit juice) every 15 minutes PRN FSBG less than 70 mg/dL, , , PRN **AND** dextrose 50% (D50W) injection 25 g, 25 g, Intravenous, Q15 MIN PRN, Linsey M Wegener, A.P.R.N.  •  acetaminophen (Tylenol) tablet 650 mg, 650 mg, Oral, Q6HRS PRN, Mehrdad Laughlin M.D., 650 mg at 06/18/22 1439  •  tacrolimus (PROGRAF) capsule 1 mg, 1 mg, Oral, BID, Mehrdad Laughlin M.D., 1 mg at 06/20/22 0528  •  mycophenolate (CELLCEPT) capsule 500 mg, 500 mg, Oral, BID, Mehrdad Laughlin M.D., 500 mg at 06/20/22 0528  •  oxyCODONE immediate-release (ROXICODONE) tablet 5 mg, 5 mg, Oral, Q4HRS PRN, Mehrdad Laughlin M.D.  •  predniSONE (DELTASONE) tablet 5 mg, 5 mg, Oral, DAILY, Mehrdad Laughlin M.D., 5 mg at 06/20/22 0528  •  heparin injection 5,000 Units, 5,000 Units, Subcutaneous, Q12HRS, James Titus M.D., 5,000 Units at 06/20/22 0528  •  heparin injection 2,800 Units, 2,800 Units, Intracatheter, DIALYSIS PRN, James Sheppard M.D., 2,800 Units at 06/02/22 1400  •  Respiratory Therapy Consult, , Nebulization, Continuous RT, Reuben Swartz M.D.  •  sodium chloride (OCEAN) 0.65 % nasal spray 2 Spray, 2 Spray, Nasal, Q2HRS PRN, Kayy Hopkins M.D., 2 [place] at 06/14/22 1742  •  dakins 0.125% (1/4 strength) topical soln, , Topical, BID, Suzi Gandara M.D., 473 mL at 06/19/22 1616  •  HYDROmorphone (Dilaudid) injection 0.5 mg, 0.5 mg, Intravenous, Q2HRS PRN, Tracey Whitfield M.D., 0.5 mg at 05/31/22 0059      Fluids    Intake/Output Summary (Last 24 hours) at 6/20/2022 1322  Last data filed at 6/20/2022 1000  Gross per 24 hour   Intake 594 ml   Output 875 ml   Net -281 ml       Laboratory      Recent Labs     06/18/22  0022 06/19/22  0017 06/20/22  0035   SODIUM 137 136 135   POTASSIUM 4.7 4.3 4.2   CHLORIDE 105 105 103   CO2 22 21 23   GLUCOSE 136* 102* 93   BUN 33* 29* 26*   CREATININE 1.48* 1.41* 1.42*   CALCIUM 8.1* 8.2* 8.1*                   Imaging  DX-CHEST-PORTABLE (1  VIEW)   Final Result      1.  Interval improvement in bilateral pulmonary opacities, likely improving edema.   2.  Bilateral infrahilar atelectasis versus consolidations persist. No effusions.         CT-HEAD W/O   Final Result      1.  Cerebral atrophy.      2.  Bilateral mastoid effusions.      3.  Otherwise, Head CT without contrast within normal limits. No evidence of acute intracranial hemorrhage or mass lesion.         DX-ABDOMEN FOR TUBE PLACEMENT   Final Result      Enteric feeding tube terminates with the tip projecting over the expected location of the 2nd-3rd portion of the duodenum.      DX-CHEST-LIMITED (1 VIEW)   Final Result      1.  Bibasilar underinflation atelectasis which could obscure an additional process. This is unchanged.   2.  Interstitial opacities likely pulmonary edema   3.  Persistently enlarged cardiac silhouette      DX-CHEST-LIMITED (1 VIEW)   Final Result      1.  Hypoinflation with mildly increased left basilar atelectasis.   2.  Removal of endotracheal tube.      DX-CHEST-LIMITED (1 VIEW)   Final Result         No significant interval change.      DX-CHEST-LIMITED (1 VIEW)   Final Result      Stable areas of patchy atelectasis/consolidation.      DX-CHEST-PORTABLE (1 VIEW)   Final Result      Stable chest x-ray findings.      DX-CHEST-PORTABLE (1 VIEW)   Final Result         1.  Pulmonary edema and/or infiltrates, somewhat increased particularly in the right lung base compared to prior study.   2.  Cardiomegaly      CT-ABDOMEN-PELVIS W/O   Final Result         Limited noncontrast exam      1. Long segment wall thickening from the descending colon to the sigmoid colon could relate to underdistention or colitis. Air-fluid level in the more proximal colon is likely diarrheal disease.      2. Right lower quadrant transplant kidney with retained prior contrast, likely secondary to renal failure/ATN/contrast nephropathy. No hydronephrosis.      Absent right kidney. Polycystic left  kidney.      CT-EXTREMITY, LOWER W/O RIGHT   Final Result      1. Postsurgical changes of right total knee arthroplasty.   2. Right knee joint effusion with thickening of the joint capsule and surrounding soft tissue edema.   3. Circumferential edema involving the soft tissues of the right lower leg with an anterior soft tissue defect and with areas of clustering on the medial right lower leg skin surface.   4. Arteriosclerosis.      DX-CHEST-PORTABLE (1 VIEW)   Final Result      1.  Unchanged position of supporting devices are visualized extent   2.  No visible pneumothorax following chest compressions   3.  Unchanged atelectasis and possible superimposed interstitial pulmonary edema or atypical pneumonia      US-EXTREMITY ARTERY LOWER UNILAT RIGHT   Final Result      US-MIRELLA SINGLE LEVEL BILAT   Final Result      US-EXTREMITY ARTERY LOWER BILAT W/MIRELLA (COMBO)   Final Result      DX-CHEST-PORTABLE (1 VIEW)   Final Result         1.  Pulmonary edema and/or infiltrates, somewhat increased particularly in the right lung base compared to prior study.   2.  Cardiomegaly      DX-ABDOMEN FOR TUBE PLACEMENT   Final Result      1. The tip of the feeding tube terminates over the junction of the gastric pylorus and duodenal bulb.   2. The remainder is stable.      DX-CHEST-PORTABLE (1 VIEW)   Final Result      1. Interval decrease in size of the right pleural effusion.   2. No left pleural effusion.   3. No visible pneumothorax status post bronchoscopy.   4. Interval placement of a Dobbhoff feeding tube.   5. Improving parenchymal loss in the right lung base, incompletely resolved.   6. The remainder is stable.      DX-CHEST-PORTABLE (1 VIEW)   Final Result      1. Interval development of a moderate right pleural effusion after placement of a left internal jugular dialysis catheter, abutting the right lateral wall of the right atrium. Verification of line position with contrast injection under fluoroscopy is    offered.   2.  Improved perihilar atelectasis.   3. The remainder is stable.      I, Dr. Matthew Brown, discussed the results of this examination directly by phone with Dr. Reuben Swartz on 5/26/2022 at 0855 hours.      EC-ECHOCARDIOGRAM COMPLETE W/ CONT   Final Result      DX-CHEST-LIMITED (1 VIEW)   Final Result         1. Right internal jugular central venous access catheter placed in the interval terminates over the upper aspect of the right atrium. No postprocedure visible pneumothorax.   2. Stable patchy parenchymal opacities in the lungs, with a stable small left pleural effusion.      DX-CHEST-PORTABLE (1 VIEW)   Final Result         1.  Pulmonary edema and/or infiltrates are identified, which are stable since the prior exam.   2.  Trace bilateral pleural effusions   3.  Cardiomegaly      CT-EXTREMITY, LOWER W/O RIGHT   Final Result      1.  Status post right total knee replacement.      2.  Diffuse atherosclerotic calcification of the arterial system of the right lower extremity suspicious for diabetes mellitus.      3.  Soft tissue attenuation in the subcutaneous tissues of the mid to distal lower leg and foot suspicious for cellulitis.      4.  No evidence of soft tissue ulceration in the right lower leg or foot.      5.  No evidence of acute osteomyelitis in the right lower leg or foot.      6.  Small subcutaneous abscesses cannot be excluded without the use of intravenous contrast.      DX-CHEST-PORTABLE (1 VIEW)   Final Result      Left internal jugular central venous access catheter terminates over a persistent left superior vena cava. No postprocedure visible pneumothorax.      The remainder is stable.      DX-TIBIA AND FIBULA RIGHT   Final Result      1. Right lower leg soft tissue swelling.   2. No acute fracture or subluxation.   3. Postsurgical changes of right total knee arthroplasty.      US-ABDOMEN F.A.S.T. LTD (FOR ED USE ONLY)   Final Result      No free fluid seen in all 4 quadrants.      Negative  FAST scan.            CT-ABDOMEN-PELVIS WITH   Final Result      1. No acute posttraumatic findings in the abdomen or pelvis.   2. Bibasilar subsegmental atelectasis.   3. Right pelvic transplant kidney without hydronephrosis.   4. Polycystic left kidney.   5. Diverticulosis without diverticulitis. Normal appendix.      CT-CTA CHEST PULMONARY ARTERY W/ RECONS   Final Result      1.  No CT evidence of pulmonary emboli.      2.  Bibasilar atelectasis.      3.  No evidence of rib fracture.      4. Diffuse coronary artery calcification.      CT-HEAD W/O   Final Result      1.  Cerebral atrophy.      2.  Otherwise, Head CT without contrast within normal limits. No evidence of acute cerebral infarction, hemorrhage or mass lesion.         CT-CSPINE WITHOUT PLUS RECONS   Final Result      1.  Moderate osteoarthritic changes at the C6-7 level with disc space narrowing and marginal osteophytosis. Further there is moderate cervical spondylotic changes at this level.      2.  No evidence of cervical spine fracture and/or subluxation.      DX-PELVIS-1 OR 2 VIEWS   Final Result      1.  Unremarkable single AP view of the pelvis.      DX-CHEST-LIMITED (1 VIEW)   Final Result      1.  Curvilinear perihilar opacities likely atelectasis and/or parenchymal scarring.      2.  Mild cardiomegaly.      IR-EXTREMITY ANGIOGRAM-UNILATERAL RIGHT    (Results Pending)        Assessment/Plan  * Non-healing wound of right lower extremity  Assessment & Plan  RLE Arterial ultrasound 5/2022 showed mild arterial insufficiency, no significant stenosis or occlusion   S/p debridement 5/24, 6/16 by wound care  Wound care following, recommended plastic surgeon consult  I talked to plastic surgeon , vascular surgery evaluation prior to any surgery.     Angiogram done by Dr. Lopez, good perfusion and no need for revasculariztion.    I reconsulted vascular surgery Dr. Hercules, who evaluated the patient.                 Dysphagia  Assessment &  Plan  SLP following   advanced diet per recommendation    Improving    Cellulitis of right lower extremity  Assessment & Plan  Completed 10 days course of IV meropenem for bacteremia on 6/6 per ID        Immunosuppression due to chronic steroid use ([facility])  Assessment & Plan  Restart immunosuppression as per nephrology  On antibiotics for infection.    Bacteremia due to Escherichia coli- (present on admission)  Assessment & Plan  Blood culture pos E coli on 5/24. Repeated blood culture negative to date. Source likely sec to RLE cellulitis  Completed 10 days course of IV meropenem (started on 5/28 given new fever and worsening chest x-ray) on 6/6  ID consulted     Acute respiratory failure with hypoxia ([facility])  Assessment & Plan  Intubated 5/26 and extubated 6/1  Mobilize as able  Respiratory protocol  Supplemental oxygen and wean as able      Knee effusion, right  Assessment & Plan  s/p bedside right knee joint aspiration on 5/28.  Synovial fluid analysis reveals hazy priscila fluid, 2638 WBCs with PMN predominance and no crystals seen.  Fluid not consistent with infection.  Culture neg    Cardiac arrest ([facility])  Assessment & Plan  S/p PEA  Monitor labs, vitals.    Acute metabolic encephalopathy  Assessment & Plan  Improving.  Monitor neurochecks  Treating infection as likely etiology    Resolved    Acute on chronic systolic heart failure (HCC)- (present on admission)  Assessment & Plan  With most recent ejection fraction 35%  Be conservative with IV fluid  On beta-blocker, ACEI on hold due to SANKET    Continue to monitor      PAF (paroxysmal atrial fibrillation) ([facility])- (present on admission)  Assessment & Plan  Hx of pAFib , not on OAC at home.   Outpatient metoprolol 25mg daily on hold due to septic shock and borderline hypotension.  Resumed metoprolol 12.5 mg twice daily on 6/10 with holding parameters  Outpatient cardiology/PCP follow up regards OAC use.     Type 2 diabetes mellitus with hyperlipidemia ([facility])- (present on  admission)  Assessment & Plan  On lantus and SSI. Holding home po diabetes meds  Hypoglycemic protocol   A1c 10.7    Thrombocytopenia (HCC)- (present on admission)  Assessment & Plan  Resolved     Acute renal failure superimposed on stage 3a chronic kidney disease (HCC)  Assessment & Plan  Nephrology consulting  Hemodialysis as per nephrology. Has been off HD as renal function improving, dialysis catheter has been removed  On IV fluid  Monitor vitals, I/O's, labs  Avoid nephrotoxins.  Immunosuppression for hx of renal transplant    Resolved, Cre is back to his baseline 1.5s    Hyperkalemia  Assessment & Plan  Low potassium diet  On [place]  Continue to monitor    Resolved    Septic shock (HCC)- (present on admission)  Assessment & Plan  Sec to E.coli bacteremia from RLE cellulitis  Completed abx   Resolved       VTE prophylaxis: heparin ppx    I have performed a physical exam and reviewed and updated ROS and Plan today (6/20/2022). In review of yesterday's note (6/19/2022), there are no changes except as documented above  Hospital Medicine Daily Progress Note    Date of Service  6/20/2022    Chief Complaint  Jama Altman is a 66 y.o. male admitted 5/24/2022 with AMS and fall    Hospital Course  66 y.o. male w/ HTN, DLD, diabetes,  polycystic kidney dz with a renal transplant 9/10/10 and on immunosuppression but has CKD, ADELFO who presented 5/24/2022 with altered mentation.  There was initial concern that he had fallen at home and struck his head.  In the ER he was in atrial fibrillation with a rapid rate of 120-160.  He was given a fluid bolus and initiated on norepinephrine.  He was admitted for severe septic shock likely related to a soft tissue infection of the right leg from where he bumped into something several days ago. During admit he was found to have E.coli bacteremia and right leg cellulitis.  Spinal fluid ws negative for meningitis.  On 5/26 the patient had respiratory distress and was intubated. On  5/28 the patient had PEA arrest and had CPR.  A right knee aspiration was performed and fluid analysis was not showing infection.  On 6/1 the patient was extubated.  The patient had CRRT initiated on 5/27 and daily HD on 5/28 per nephrology notes. Patient shows sign of recovery and has been off dialysis for a few days.  E. coli bacteremia, source likely RLE cellulitis. ID consulted.  Completed iv meropenem with stop date 6/6/2022.     Interval Problem Update  Patient was seen and examined at bedside.  Denies nausea vomiting or pain.     Pending wound care recommendation    Patient reported diarrhea with stool softener.  Change Senokot to as needed    SANKET resolved, baseline at 1.5s, off IV fluid  Respiratory distress resolved, off oxygen    PT/OT recommend postacute  Not a candidate for inpatient rehab   Pending SNF    Consultants/Specialty  Infectious disease  Critical care medicine  Nephrology    Code Status  Full Code    Disposition  [facility]    Review of Systems  All systems reviewed and negative except as noted per above.    Physical Exam  Temp:  [36.4 °C (97.6 °F)-37.1 °C (98.7 °F)] 37.1 °C (98.7 °F)  Pulse:  [63-93] 82  Resp:  [16-18] 17  BP: (101-109)/(64-72) 101/64  SpO2:  [92 %-97 %] 95 %    General appearance: NAD, conversant, family at bedside  Eyes: anicteric sclerae, moist conjunctivae; no lid-lag; PERRLA, noted subconjunctival hemorrhage, pronounce on R eye   HENT: Atraumatic; oropharynx clear with moist mucous membranes and no mucosal ulcerations; normal hard and soft palate  Neck: Trachea midline; FROM, supple, no thyromegaly or lymphadenopathy  Lungs: CTA, with normal respiratory effort and no intercostal retractions  CV: RRR, no MRGs  Abdomen: Soft, non-tender; no masses or HSM  Extremities: RLE erythema with clean dressing  Skin: Normal temperature, turgor and texture; no rash, ulcers or subcutaneous nodules  Psych: Answering questions      Current Facility-Administered Medications:   •  senna-docusate  (PERICOLACE or SENOKOT S) 8.6-50 MG per tablet 2 Tablet, 2 Tablet, Oral, BID PRN **AND** polyethylene glycol/lytes (MIRALAX) PACKET 1 Packet, 1 Packet, Oral, QDAY PRN **AND** [DISCONTINUED] magnesium hydroxide (MILK OF MAGNESIA) suspension 30 mL, 30 mL, Oral, QDAY PRN **AND** bisacodyl (DULCOLAX) suppository 10 mg, 10 mg, Rectal, QDAY PRN, Suzi Gandara M.D.  •  insulin GLARGINE (Lantus,Semglee) injection, 15 Units, Subcutaneous, BID, Timo Muñoz M.D., 15 Units at 06/20/22 0902  •  metoprolol tartrate (LOPRESSOR) tablet 12.5 mg, 12.5 mg, Oral, TWICE DAILY, Timo Muñoz M.D., 12.5 mg at 06/20/22 0528  •  insulin regular (HumuLIN R,NovoLIN R) injection, 3-14 Units, Subcutaneous, 4X/DAY ACHS, 3 Units at 06/18/22 2049 **AND** POC blood glucose manual result, , , Q AC AND BEDTIME(S) **AND** NOTIFY MD and PharmD, , , Once **AND** Administer 20 grams of glucose (approximately 8 ounces of fruit juice) every 15 minutes PRN FSBG less than 70 mg/dL, , , PRN **AND** dextrose 50% (D50W) injection 25 g, 25 g, Intravenous, Q15 MIN PRN, Linsey M Wegener, A.PRudiRRudiN.  •  acetaminophen (Tylenol) tablet 650 mg, 650 mg, Oral, Q6HRS PRN, Mehrdad Laughlin M.D., 650 mg at 06/18/22 1439  •  tacrolimus (PROGRAF) capsule 1 mg, 1 mg, Oral, BID, Mehrdad Laughlin M.D., 1 mg at 06/20/22 0528  •  mycophenolate (CELLCEPT) capsule 500 mg, 500 mg, Oral, BID, Mehrdad Laughlin M.D., 500 mg at 06/20/22 0528  •  oxyCODONE immediate-release (ROXICODONE) tablet 5 mg, 5 mg, Oral, Q4HRS PRN, Mehrdad aLughlin M.D.  •  predniSONE (DELTASONE) tablet 5 mg, 5 mg, Oral, DAILY, Mehrdad Laughlin M.D., 5 mg at 06/20/22 0528  •  heparin injection 5,000 Units, 5,000 Units, Subcutaneous, Q12HRS, James Titus M.D., 5,000 Units at 06/20/22 0528  •  heparin injection 2,800 Units, 2,800 Units, Intracatheter, DIALYSIS PRN, James Sheppard M.D., 2,800 Units at 06/02/22 1400  •  Respiratory Therapy Consult, , Nebulization, Continuous RT, Reuben Swartz M.D.  •  sodium chloride  (OCEAN) 0.65 % nasal spray 2 Spray, 2 Spray, Nasal, Q2HRS PRN, Kayy Hopkins M.D., 2 [place] at 06/14/22 1742  •  dakins 0.125% (1/4 strength) topical soln, , Topical, BID, Suzi Gandara M.D., 473 mL at 06/19/22 1616  •  HYDROmorphone (Dilaudid) injection 0.5 mg, 0.5 mg, Intravenous, Q2HRS PRN, Tracey Whitfield M.D., 0.5 mg at 05/31/22 0059      Fluids    Intake/Output Summary (Last 24 hours) at 6/20/2022 1322  Last data filed at 6/20/2022 1000  Gross per 24 hour   Intake 594 ml   Output 875 ml   Net -281 ml       Laboratory      Recent Labs     06/18/22  0022 06/19/22  0017 06/20/22  0035   SODIUM 137 136 135   POTASSIUM 4.7 4.3 4.2   CHLORIDE 105 105 103   CO2 22 21 23   GLUCOSE 136* 102* 93   BUN 33* 29* 26*   CREATININE 1.48* 1.41* 1.42*   CALCIUM 8.1* 8.2* 8.1*                   Imaging  DX-CHEST-PORTABLE (1 VIEW)   Final Result      1.  Interval improvement in bilateral pulmonary opacities, likely improving edema.   2.  Bilateral infrahilar atelectasis versus consolidations persist. No effusions.         CT-HEAD W/O   Final Result      1.  Cerebral atrophy.      2.  Bilateral mastoid effusions.      3.  Otherwise, Head CT without contrast within normal limits. No evidence of acute intracranial hemorrhage or mass lesion.         DX-ABDOMEN FOR TUBE PLACEMENT   Final Result      Enteric feeding tube terminates with the tip projecting over the expected location of the 2nd-3rd portion of the duodenum.      DX-CHEST-LIMITED (1 VIEW)   Final Result      1.  Bibasilar underinflation atelectasis which could obscure an additional process. This is unchanged.   2.  Interstitial opacities likely pulmonary edema   3.  Persistently enlarged cardiac silhouette      DX-CHEST-LIMITED (1 VIEW)   Final Result      1.  Hypoinflation with mildly increased left basilar atelectasis.   2.  Removal of endotracheal tube.      DX-CHEST-LIMITED (1 VIEW)   Final Result         No significant interval change.      DX-CHEST-LIMITED (1 VIEW)    Final Result      Stable areas of patchy atelectasis/consolidation.      DX-CHEST-PORTABLE (1 VIEW)   Final Result      Stable chest x-ray findings.      DX-CHEST-PORTABLE (1 VIEW)   Final Result         1.  Pulmonary edema and/or infiltrates, somewhat increased particularly in the right lung base compared to prior study.   2.  Cardiomegaly      CT-ABDOMEN-PELVIS W/O   Final Result         Limited noncontrast exam      1. Long segment wall thickening from the descending colon to the sigmoid colon could relate to underdistention or colitis. Air-fluid level in the more proximal colon is likely diarrheal disease.      2. Right lower quadrant transplant kidney with retained prior contrast, likely secondary to renal failure/ATN/contrast nephropathy. No hydronephrosis.      Absent right kidney. Polycystic left kidney.      CT-EXTREMITY, LOWER W/O RIGHT   Final Result      1. Postsurgical changes of right total knee arthroplasty.   2. Right knee joint effusion with thickening of the joint capsule and surrounding soft tissue edema.   3. Circumferential edema involving the soft tissues of the right lower leg with an anterior soft tissue defect and with areas of clustering on the medial right lower leg skin surface.   4. Arteriosclerosis.      DX-CHEST-PORTABLE (1 VIEW)   Final Result      1.  Unchanged position of supporting devices are visualized extent   2.  No visible pneumothorax following chest compressions   3.  Unchanged atelectasis and possible superimposed interstitial pulmonary edema or atypical pneumonia      US-EXTREMITY ARTERY LOWER UNILAT RIGHT   Final Result      US-MIRELLA SINGLE LEVEL BILAT   Final Result      US-EXTREMITY ARTERY LOWER BILAT W/MIRELLA (COMBO)   Final Result      DX-CHEST-PORTABLE (1 VIEW)   Final Result         1.  Pulmonary edema and/or infiltrates, somewhat increased particularly in the right lung base compared to prior study.   2.  Cardiomegaly      DX-ABDOMEN FOR TUBE PLACEMENT   Final Result       1. The tip of the feeding tube terminates over the junction of the gastric pylorus and duodenal bulb.   2. The remainder is stable.      DX-CHEST-PORTABLE (1 VIEW)   Final Result      1. Interval decrease in size of the right pleural effusion.   2. No left pleural effusion.   3. No visible pneumothorax status post bronchoscopy.   4. Interval placement of a Dobbhoff feeding tube.   5. Improving parenchymal loss in the right lung base, incompletely resolved.   6. The remainder is stable.      DX-CHEST-PORTABLE (1 VIEW)   Final Result      1. Interval development of a moderate right pleural effusion after placement of a left internal jugular dialysis catheter, abutting the right lateral wall of the right atrium. Verification of line position with contrast injection under fluoroscopy is    offered.   2. Improved perihilar atelectasis.   3. The remainder is stable.      I, Dr. Matthew Brown, discussed the results of this examination directly by phone with Dr. Reuben Swartz on 5/26/2022 at 0855 hours.      EC-ECHOCARDIOGRAM COMPLETE W/ CONT   Final Result      DX-CHEST-LIMITED (1 VIEW)   Final Result         1. Right internal jugular central venous access catheter placed in the interval terminates over the upper aspect of the right atrium. No postprocedure visible pneumothorax.   2. Stable patchy parenchymal opacities in the lungs, with a stable small left pleural effusion.      DX-CHEST-PORTABLE (1 VIEW)   Final Result         1.  Pulmonary edema and/or infiltrates are identified, which are stable since the prior exam.   2.  Trace bilateral pleural effusions   3.  Cardiomegaly      CT-EXTREMITY, LOWER W/O RIGHT   Final Result      1.  Status post right total knee replacement.      2.  Diffuse atherosclerotic calcification of the arterial system of the right lower extremity suspicious for diabetes mellitus.      3.  Soft tissue attenuation in the subcutaneous tissues of the mid to distal lower leg and foot  suspicious for cellulitis.      4.  No evidence of soft tissue ulceration in the right lower leg or foot.      5.  No evidence of acute osteomyelitis in the right lower leg or foot.      6.  Small subcutaneous abscesses cannot be excluded without the use of intravenous contrast.      DX-CHEST-PORTABLE (1 VIEW)   Final Result      Left internal jugular central venous access catheter terminates over a persistent left superior vena cava. No postprocedure visible pneumothorax.      The remainder is stable.      DX-TIBIA AND FIBULA RIGHT   Final Result      1. Right lower leg soft tissue swelling.   2. No acute fracture or subluxation.   3. Postsurgical changes of right total knee arthroplasty.      US-ABDOMEN F.A.S.T. LTD (FOR ED USE ONLY)   Final Result      No free fluid seen in all 4 quadrants.      Negative FAST scan.            CT-ABDOMEN-PELVIS WITH   Final Result      1. No acute posttraumatic findings in the abdomen or pelvis.   2. Bibasilar subsegmental atelectasis.   3. Right pelvic transplant kidney without hydronephrosis.   4. Polycystic left kidney.   5. Diverticulosis without diverticulitis. Normal appendix.      CT-CTA CHEST PULMONARY ARTERY W/ RECONS   Final Result      1.  No CT evidence of pulmonary emboli.      2.  Bibasilar atelectasis.      3.  No evidence of rib fracture.      4. Diffuse coronary artery calcification.      CT-HEAD W/O   Final Result      1.  Cerebral atrophy.      2.  Otherwise, Head CT without contrast within normal limits. No evidence of acute cerebral infarction, hemorrhage or mass lesion.         CT-CSPINE WITHOUT PLUS RECONS   Final Result      1.  Moderate osteoarthritic changes at the C6-7 level with disc space narrowing and marginal osteophytosis. Further there is moderate cervical spondylotic changes at this level.      2.  No evidence of cervical spine fracture and/or subluxation.      DX-PELVIS-1 OR 2 VIEWS   Final Result      1.  Unremarkable single AP view of the  pelvis.      DX-CHEST-LIMITED (1 VIEW)   Final Result      1.  Curvilinear perihilar opacities likely atelectasis and/or parenchymal scarring.      2.  Mild cardiomegaly.      IR-EXTREMITY ANGIOGRAM-UNILATERAL RIGHT    (Results Pending)        Assessment/Plan  * Non-healing wound of right lower extremity  Assessment & Plan  RLE Arterial ultrasound 5/2022 showed mild arterial insufficiency, no significant stenosis or occlusion   S/p debridement 5/24, 6/16 by wound care  Wound care following, recommended plastic surgeon consult  I talked to plastic surgeon , vascular surgery evaluation prior to any surgery.     Angiogram done by Dr. Lopez, good perfusion and no need for revasculariztion.    I reconsulted vascular surgery Dr. Hercules, who evaluated the patient.                 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    Bacteremia due to Escherichia coli- (present on admission)  Assessment & Plan  Blood culture pos E coli on 5/24. Repeated blood culture negative to date. Source likely sec to RLE cellulitis  Completed 10 days course of IV meropenem (started on 5/28 given new fever and worsening chest x-ray) on 6/6  ID consulted     Acute respiratory failure with hypoxia ([facility])  Assessment & Plan  Intubated 5/26 and extubated 6/1  Mobilize as able  Respiratory protocol  Supplemental oxygen and wean as able      Knee effusion, right  Assessment & Plan  s/p bedside right knee joint aspiration on 5/28.  Synovial fluid analysis reveals hazy priscila fluid, 2638 WBCs with PMN predominance and no crystals seen.  Fluid not consistent with infection.  Culture neg    Cardiac arrest ([facility])  Assessment  & Plan  S/p PEA  Monitor labs, vitals.    Acute metabolic encephalopathy  Assessment & Plan  Improving.  Monitor neurochecks  Treating infection as likely etiology    Resolved    Acute on chronic systolic heart failure (HCC)- (present on admission)  Assessment & Plan  With most recent ejection fraction 35%  Be conservative with IV fluid  On beta-blocker, ACEI on hold due to SANKET    Continue to monitor      PAF (paroxysmal atrial fibrillation) (HCC)- (present on admission)  Assessment & Plan  Hx of pAFib , not on OAC at home.   Outpatient metoprolol 25mg daily on hold due to septic shock and borderline hypotension.  Resumed metoprolol 12.5 mg twice daily on 6/10 with holding parameters  Outpatient cardiology/PCP follow up regards OAC use.     Type 2 diabetes mellitus with hyperlipidemia (HCC)- (present on admission)  Assessment & Plan  On lantus and SSI. Holding home po diabetes meds  Hypoglycemic protocol   A1c 10.7    Thrombocytopenia (HCC)- (present on admission)  Assessment & Plan  Resolved     Acute renal failure superimposed on stage 3a chronic kidney disease (HCC)  Assessment & Plan  Nephrology consulting  Hemodialysis as per nephrology. Has been off HD as renal function improving, dialysis catheter has been removed  On IV fluid  Monitor vitals, I/O's, labs  Avoid nephrotoxins.  Immunosuppression for hx of renal transplant    Resolved, Cre is back to his baseline 1.5s    Hyperkalemia  Assessment & Plan  Low potassium diet  On Veltassa  Continue to monitor    Resolved    Septic shock (HCC)- (present on admission)  Assessment & Plan  Sec to E.coli bacteremia from RLE cellulitis  Completed abx   Resolved       VTE prophylaxis: heparin ppx    I have performed a physical exam and reviewed and updated ROS and Plan today (6/20/2022). In review of yesterday's note (6/19/2022), there are no changes except as documented above

## 2022-06-20 NOTE — DISCHARGE PLANNING
TCN following. HTH/SCP chart review completed. Appreciate collaboration with RN Lydia ISIDRO. Noted patient with continued improvement in motivation and participation with PT/OT. Please refer to TCN note 6/17/2022 regarding patient current recommendations for post acute placement. CM continues to follow for accepting SNF.    TCN met with patient with brother also present at bedside. Patient verbalized agreement to expansion of SNF choice, also to now include [facility]. Advised we will inform patient and son, Afshin, regarding accepting facilities to allow them choice, as appropriate, once there are accepting facilities.     No additional TCN needs identified this day, as patient with need for ongoing inpatient medical management and CM continues to follow for accepting SNF facilities this day.     TCN will continue to follow and collaborate with discharge planning team as additional post acute needs arise. Thank you.     Previously completed:  - Orders received for: PT, OT, SLP -> rec placement  - Choice forms: HH, SNF, IRF (rehab essentially declines at this time; more SNF appropriate per review)  - GSC introduced (Y), referral (not sent).

## 2022-06-20 NOTE — THERAPY
"Physical Therapy   Daily Treatment     Patient Name: Jama Altman  Age:  66 y.o., Sex:  male  Medical Record #: [id number]  Today's Date: 6/20/2022     Precautions  Precautions: Fall Risk;Swallow Precautions ( See Comments)    Assessment  Pt demonstrates significant improvements from previous session. Pt required less assistance with sup>sit, STS, and exercises. This session pt was able to tolerate transfers to and from a BSC. Pt demonstrates improved strength that was functionally seen in improved STS. Pt demonstrates ability to tolerate increased frequency of PT services. Increase frequency to 5x/week.     Plan    Treatment plan modified to 5 times per week until therapy goals are met for the following treatments:  Bed Mobility, Electrical Stimulation - Attended, Electrical Stimulation - Unattended, Equipment, Gait Training, Manual Therapy, Neuro Re-Education / Balance, Self Care/Home Evaluation, Sensory Integration Techniques, Stair Training, Therapeutic Activities, and Therapeutic Exercises.    DC Equipment Recommendations: Unable to determine at this time  Discharge Recommendations: Recommend post-acute placement for additional physical therapy services prior to discharge home      Subjective    \"I trust you.\"     Objective       06/20/22 1129   Precautions   Precautions Fall Risk;Swallow Precautions ( See Comments)   Vitals   O2 Delivery Device None - Room Air   Cognition    Comments Pt demonstrates some slight deficits in recall. Pt was unable to accurately recall the HEP that this PT set for him on our last treatment session. PT not provided multiple written handouts to help with poor recall. Besides that patient seems within functional limits for surface topics, did not attempt to assess deeper into any aspects of exectutive functioning.   Passive ROM Lower Body   Passive ROM Lower Body WDL   Active ROM Lower Body    Active ROM Lower Body  WDL   Comments Pt still presents with weakness, but demonstrates " "improvement from last session. B LE 4-/5   Strength Lower Body   Lower Body Strength  X   Comments B LE 4-/5   Supine Lower Body Exercise   Supine Lower Body Exercises Yes   Hip Abduction 2 sets of 10   Hip Adduction  2 sets of 10   Heel Slide 2 sets of 10   Gluteal Isometrics 2 sets of 10   Quadriceps Isometrics 2 sets of 10   Comments Hip flexion with LE elevated off bed with minimal resistance in both flexion and extension.   Sitting Lower Body Exercises   Sitting Lower Body Exercises Yes   Long Arc Quad 2 sets of 10   Marching 2 sets of 10   Standing Lower Body Exercises   Comments Partial squats with A   Home Exercise Program   Comments Pt given written handout of \"Faraz's homework\" to have a visual aide of HEP of in bed exercises, B rolling, and elevated HOB to assist with BP regulation.   Neuro-Muscular Treatments   Neuro-Muscular Treatments Compensatory Strategies;Facilitation;Sequencing   Balance   Sitting Balance (Static) Fair   Sitting Balance (Dynamic) Fair -   Standing Balance (Static) Poor   Standing Balance (Dynamic) Poor -   Weight Shift Sitting Fair   Weight Shift Standing Poor   Skilled Intervention Compensatory Strategies;Facilitation;Postural Facilitation;Sequencing;Tactile Cuing;Verbal Cuing   Comments Improved EOB balance, pt able to maintain balance without any UE support. Pt able to maintain balance with SPV (static).   Gait Analysis   Gait Level Of Assist Maximal Assist   Assistive Device Hand Held Assist   Distance (Feet) 2   # of Times Distance was Traveled 2   Deviation Step To;Decreased Base Of Support;Shuffled Gait;Bradykinetic;Decreased Heel Strike;Decreased Toe Off   Weight Bearing Status No restrictions   Skilled Intervention Compensatory Strategies;Facilitation;Postural Facilitation;Sequencing;Tactile Cuing;Verbal Cuing   Bed Mobility    Supine to Sit Minimal Assist   Sit to Supine Minimal Assist   Scooting Minimal Assist   Rolling Minimal Assist to Rt.   Skilled Intervention " Compensatory Strategies;Facilitation;Tactile Cuing;Verbal Cuing   Comments Pt uses log roll strategy and bed rails. Able to get feet off bed with sup>sit and requires assistance with his trunk. Pt able to descend trunk with sit>sup and requires assistance with his LE B.   Functional Mobility   Sit to Stand Moderate Assist   Toilet Transfers Moderate Assist   Tub / Shower Transfers   (BSC)   Transfer Method Stand Step   Mobility sup to sit, EOB, transfer to BSC, transfer to EOB, STSx2, ther ex, sit to sup   Skilled Intervention Compensatory Strategies;Facilitation;Postural Facilitation;Tactile Cuing;Verbal Cuing;Sequencing   Comments Pt demonstrated improved B LE strength as he is able to participate much better with STS. B quad activiation is much improved. Pt educated on increasing upright mobility and time OOB.   How much difficulty does the patient currently have...   Turning over in bed (including adjusting bedclothes, sheets and blankets)? 2   Sitting down on and standing up from a chair with arms (e.g., wheelchair, bedside commode, etc.) 1   Moving from lying on back to sitting on the side of the bed? 1   How much help from another person does the patient currently need...   Moving to and from a bed to a chair (including a wheelchair)? 2   Need to walk in a hospital room? 2   Climbing 3-5 steps with a railing? 1   6 clicks Mobility Score 9   Activity Tolerance   Sitting Edge of Bed 20 minutes/ on BSC 10 minutes   Standing 5 minutes total   Short Term Goals    Short Term Goal # 1 Pt will perform supine <> sit without bed features with SPV within 6 visits in order to progress toward PLOF   Goal Outcome # 1 Progressing as expected   Short Term Goal # 2 Pt will perform STS with FWW and min A within 6 visits in order to progress OOB mobility   Goal Outcome # 2 Progressing as expected   Short Term Goal # 3 Pt will perform functional transfers with FWW and min A within 6 visits in order to progress OOB mobility    Goal Outcome # 3 Progressing as expected   Short Term Goal # 4 Pt will ambulate 100 ft with FWW and mod A within 6 visits in order to progress functional mobility   Goal Outcome # 4 Goal not met   Education Group   Education Provided Role of Physical Therapist   Role of Physical Therapist Patient Response Patient;Acceptance;Explanation;Verbal Demonstration   Anticipated Discharge Equipment and Recommendations   DC Equipment Recommendations Unable to determine at this time   Discharge Recommendations Recommend post-acute placement for additional physical therapy services prior to discharge home   Interdisciplinary Plan of Care Collaboration   IDT Collaboration with  Nursing   Patient Position at End of Therapy In Bed;Bed Alarm On;Phone within Reach;Tray Table within Reach;Call Light within Reach   Collaboration Comments Rn aware of session   Session Information   Date / Session Number  6/20-7(1/5, 6/26)   Priority   (RYANN TO KEEP)

## 2022-06-20 NOTE — CARE PLAN
The patient is Stable - Low risk of patient condition declining or worsening    Shift Goals  Clinical Goals: wound care  Patient Goals: to rest  Family Goals: n/a    Progress made toward(s) clinical / shift goals:  vitals are stable    Patient is not progressing towards the following goals:

## 2022-06-20 NOTE — THERAPY
Missed Therapy     Patient Name: Jama Altman  Age:  66 y.o., Sex:  male  Medical Record #: [id number]  Today's Date: 6/20/2022    Spoke with bedside RN, pt just finished with PT, will attempt in PM as appropriate or 6/21 for OT follow up session.      Live Freeman OTMAGY, OTR/L

## 2022-06-20 NOTE — DIETARY
Nutrition Services Brief Update:    Day 27 of admit.  Jama Altman is a 66 y.o. male with admitting DX of Septic shock.    Current Diet: Level 5 - minced and moist, Level 2 - mildly thick. Recorded PO intake has improved with pt eating >50% most meals. Encourage continued good PO intake.    RD to monitor weekly per department policy.    Problem: Nutritional:  Goal: Achieve adequate nutritional intake  Description: Patient will consume >50% of meals  Outcome: Met

## 2022-06-20 NOTE — DISCHARGE PLANNING
Case Management Discharge Planning    Admission Date: 5/24/2022  GMLOS: 12.4  ALOS: 27    6-Clicks ADL Score: 12  6-Clicks Mobility Score: 9  PT and/or OT Eval ordered: Yes  Post-acute Referrals Ordered: Yes  Post-acute Choice Obtained: Yes  Has referral(s) been sent to post-acute provider:  Yes      Anticipated Discharge Dispo: Discharge Disposition: D/T to short term gen hosp for IP care w/ planned IP readmit (82) vs. Inpatient Rehab vs SNF    DME Needed: Unable to determine at this time    Action(s) Taken: Updated Provider/Nurse on Discharge Plan and OTHER: discussed patient's discharge plans during IDT rounds with medical and nursing teams. Choice received for [facility] and referral sent for post-hospitalization care. DPA indicates Coleen at [facility] has been updated on patient's current progression and will see if they are able to accept and when they may have beds available. Consult by Vascular Surgery, Dr. Hercules, completed. They indicate if surgery is indicated it can be done as an outpatient unless patient remains hospitalized for other reasons. DPA notified of this and will follow-up with [facility] regarding potential acceptance and bed availability.    Escalations Completed: Provider, Pending Discharge Destination and Bedside RN    Medically Clear: Yes    Next Steps: f/u with DPA regarding potential acceptance and bed availability at [facility] (or any other SNF). F/u with medical and nursing teams regarding discharge plans, needs, and barriers.    Barriers to Discharge: Pending Placement     Is the patient up for discharge tomorrow: No

## 2022-06-21 LAB
EKG IMPRESSION: NORMAL
GLUCOSE BLD STRIP.AUTO-MCNC: 103 MG/DL (ref 65–99)
GLUCOSE BLD STRIP.AUTO-MCNC: 109 MG/DL (ref 65–99)
GLUCOSE BLD STRIP.AUTO-MCNC: 140 MG/DL (ref 65–99)
GLUCOSE BLD STRIP.AUTO-MCNC: 215 MG/DL (ref 65–99)
GLUCOSE BLD STRIP.AUTO-MCNC: 67 MG/DL (ref 65–99)

## 2022-06-21 PROCEDURE — 93005 ELECTROCARDIOGRAM TRACING: CPT | Performed by: STUDENT IN AN ORGANIZED HEALTH CARE EDUCATION/TRAINING PROGRAM

## 2022-06-21 PROCEDURE — 700111 HCHG RX REV CODE 636 W/ 250 OVERRIDE (IP): Performed by: HOSPITALIST

## 2022-06-21 PROCEDURE — 97530 THERAPEUTIC ACTIVITIES: CPT

## 2022-06-21 PROCEDURE — 97110 THERAPEUTIC EXERCISES: CPT

## 2022-06-21 PROCEDURE — 700102 HCHG RX REV CODE 250 W/ 637 OVERRIDE(OP): Performed by: STUDENT IN AN ORGANIZED HEALTH CARE EDUCATION/TRAINING PROGRAM

## 2022-06-21 PROCEDURE — 93010 ELECTROCARDIOGRAM REPORT: CPT | Performed by: INTERNAL MEDICINE

## 2022-06-21 PROCEDURE — 700102 HCHG RX REV CODE 250 W/ 637 OVERRIDE(OP): Performed by: HOSPITALIST

## 2022-06-21 PROCEDURE — 700105 HCHG RX REV CODE 258: Performed by: STUDENT IN AN ORGANIZED HEALTH CARE EDUCATION/TRAINING PROGRAM

## 2022-06-21 PROCEDURE — 700111 HCHG RX REV CODE 636 W/ 250 OVERRIDE (IP): Performed by: STUDENT IN AN ORGANIZED HEALTH CARE EDUCATION/TRAINING PROGRAM

## 2022-06-21 PROCEDURE — 97535 SELF CARE MNGMENT TRAINING: CPT

## 2022-06-21 PROCEDURE — 770001 HCHG ROOM/CARE - MED/SURG/GYN PRIV*

## 2022-06-21 PROCEDURE — 82962 GLUCOSE BLOOD TEST: CPT

## 2022-06-21 PROCEDURE — 99232 SBSQ HOSP IP/OBS MODERATE 35: CPT | Performed by: STUDENT IN AN ORGANIZED HEALTH CARE EDUCATION/TRAINING PROGRAM

## 2022-06-21 PROCEDURE — A9270 NON-COVERED ITEM OR SERVICE: HCPCS | Performed by: STUDENT IN AN ORGANIZED HEALTH CARE EDUCATION/TRAINING PROGRAM

## 2022-06-21 PROCEDURE — A9270 NON-COVERED ITEM OR SERVICE: HCPCS | Performed by: HOSPITALIST

## 2022-06-21 RX ORDER — ACETAMINOPHEN 325 MG/1
650 TABLET ORAL EVERY 6 HOURS PRN
Status: DISCONTINUED | OUTPATIENT
Start: 2022-06-21 | End: 2022-06-22 | Stop reason: HOSPADM

## 2022-06-21 RX ORDER — CHOLECALCIFEROL (VITAMIN D3) 125 MCG
5 CAPSULE ORAL NIGHTLY PRN
Status: DISCONTINUED | OUTPATIENT
Start: 2022-06-21 | End: 2022-06-22 | Stop reason: HOSPADM

## 2022-06-21 RX ORDER — MIDODRINE HYDROCHLORIDE 5 MG/1
5 TABLET ORAL
Status: DISCONTINUED | OUTPATIENT
Start: 2022-06-21 | End: 2022-06-22 | Stop reason: HOSPADM

## 2022-06-21 RX ORDER — HEPARIN SODIUM 5000 [USP'U]/ML
5000 INJECTION, SOLUTION INTRAVENOUS; SUBCUTANEOUS EVERY 12 HOURS
Status: COMPLETED | OUTPATIENT
Start: 2022-06-21 | End: 2022-06-21

## 2022-06-21 RX ORDER — SODIUM CHLORIDE 9 MG/ML
500 INJECTION, SOLUTION INTRAVENOUS ONCE
Status: COMPLETED | OUTPATIENT
Start: 2022-06-21 | End: 2022-06-21

## 2022-06-21 RX ADMIN — MIDODRINE HYDROCHLORIDE 5 MG: 5 TABLET ORAL at 17:09

## 2022-06-21 RX ADMIN — DAKIN'S SOLUTION 0.125% (QUARTER STRENGTH) 473 ML: 0.12 SOLUTION at 16:03

## 2022-06-21 RX ADMIN — ACETAMINOPHEN 650 MG: 325 TABLET ORAL at 14:57

## 2022-06-21 RX ADMIN — MYCOPHENOLATE MOFETIL 500 MG: 250 CAPSULE ORAL at 05:59

## 2022-06-21 RX ADMIN — TACROLIMUS 1 MG: 1 CAPSULE ORAL at 05:59

## 2022-06-21 RX ADMIN — MIDODRINE HYDROCHLORIDE 5 MG: 5 TABLET ORAL at 10:21

## 2022-06-21 RX ADMIN — TACROLIMUS 1 MG: 1 CAPSULE ORAL at 17:09

## 2022-06-21 RX ADMIN — MYCOPHENOLATE MOFETIL 500 MG: 250 CAPSULE ORAL at 17:10

## 2022-06-21 RX ADMIN — PREDNISONE 5 MG: 5 TABLET ORAL at 05:59

## 2022-06-21 RX ADMIN — Medication 5 MG: at 21:22

## 2022-06-21 RX ADMIN — HEPARIN SODIUM 5000 UNITS: 5000 INJECTION, SOLUTION INTRAVENOUS; SUBCUTANEOUS at 05:59

## 2022-06-21 RX ADMIN — HEPARIN SODIUM 5000 UNITS: 5000 INJECTION, SOLUTION INTRAVENOUS; SUBCUTANEOUS at 17:08

## 2022-06-21 RX ADMIN — SODIUM CHLORIDE 500 ML: 9 INJECTION, SOLUTION INTRAVENOUS at 10:21

## 2022-06-21 ASSESSMENT — COGNITIVE AND FUNCTIONAL STATUS - GENERAL
HELP NEEDED FOR BATHING: A LOT
DRESSING REGULAR LOWER BODY CLOTHING: A LOT
SUGGESTED CMS G CODE MODIFIER DAILY ACTIVITY: CK
SUGGESTED CMS G CODE MODIFIER MOBILITY: CL
CLIMB 3 TO 5 STEPS WITH RAILING: TOTAL
DRESSING REGULAR UPPER BODY CLOTHING: A LITTLE
WALKING IN HOSPITAL ROOM: A LOT
MOVING FROM LYING ON BACK TO SITTING ON SIDE OF FLAT BED: UNABLE
PERSONAL GROOMING: A LITTLE
TOILETING: A LOT
MOBILITY SCORE: 13
EATING MEALS: A LITTLE
DAILY ACTIVITIY SCORE: 15
STANDING UP FROM CHAIR USING ARMS: A LOT
MOVING TO AND FROM BED TO CHAIR: A LITTLE

## 2022-06-21 ASSESSMENT — GAIT ASSESSMENTS
GAIT LEVEL OF ASSIST: MINIMAL ASSIST
DEVIATION: STEP TO;DECREASED BASE OF SUPPORT;SHUFFLED GAIT;DECREASED HEEL STRIKE;DECREASED TOE OFF
DISTANCE (FEET): 2
ASSISTIVE DEVICE: FRONT WHEEL WALKER

## 2022-06-21 ASSESSMENT — FIBROSIS 4 INDEX: FIB4 SCORE: 1.97

## 2022-06-21 ASSESSMENT — PAIN DESCRIPTION - PAIN TYPE: TYPE: ACUTE PAIN

## 2022-06-21 NOTE — DISCHARGE PLANNING
Agency/Facility Name: [facility]  Spoke To: Han  Outcome: DPA was notified [facility] will go over the new notes and call back regarding bed acceptance.    1513:  Agency/Facility Name: [facility]  Spoke To: Han  Outcome: DPA spoke with Han regarding SNF acceptance, Han to edit status and call DPA back.     1610:  Agency/Facility Name: [facility]  Spoke To: Han  Outcome: DPA asked Han to review new records on Pt to discuss pending acceptance.      RN CM Notified.

## 2022-06-21 NOTE — CARE PLAN
The patient is Stable - Low risk of patient condition declining or worsening    Shift Goals  Clinical Goals: Wound care, PT  Patient Goals: PT  Family Goals: N/A    Progress made toward(s) clinical / shift goals:  pt was educated on their medications and importance of their skin integrity   Problem: Knowledge Deficit - Standard  Goal: Patient and family/care givers will demonstrate understanding of plan of care, disease process/condition, diagnostic tests and medications  Outcome: Progressing     Problem: Pain - Standard  Goal: Alleviation of pain or a reduction in pain to the patient’s comfort goal  Outcome: Progressing     Problem: Skin Integrity  Goal: Skin integrity is maintained or improved  Outcome: Progressing     Problem: Fall Risk  Goal: Patient will remain free from falls  Outcome: Progressing     Problem: Discharge Barriers/Planning  Goal: Patient's continuum of care needs are met  Outcome: Progressing     Problem: Urinary Elimination  Goal: Establish and maintain regular urinary output  Outcome: Progressing     Problem: Mobility  Goal: Patient's capacity to carry out activities will improve  Outcome: Progressing     Problem: Self Care  Goal: Patient will have the ability to perform ADLs independently or with assistance (bathe, groom, dress, toilet and feed)  Outcome: Progressing     Problem: Infection - Standard  Goal: Patient will remain free from infection  Outcome: Progressing     Problem: Wound/ / Incision Healing  Goal: Patient's wound/surgical incision will decrease in size and heals properly  Outcome: Progressing       Patient is not progressing towards the following goals:

## 2022-06-21 NOTE — THERAPY
Occupational Therapy  Daily Treatment     Patient Name: Jama Altman  Age:  66 y.o., Sex:  male  Medical Record #: [id number]  Today's Date: 6/21/2022     Precautions  Precautions: Fall Risk, Swallow Precautions ( See Comments)  Comments: cellulitis RLE    Assessment    Pt seen for follow up OT tx session, pt making steady progress with functional mobility and ADLs, pleasant, cooperative, and motivated to participate in order to increase functional independence. Pt would benefit from continued skilled therapy while admitted as well as recommend post-acute placement. Left in care of RN, stating all needs WFL, continuing to assess blood sugar after treatment session.    Plan    Continue current treatment plan.    DC Equipment Recommendations: (P) Unable to determine at this time  Discharge Recommendations: (P) Recommend post-acute placement for additional occupational therapy services prior to discharge home    Objective       06/21/22 0850   Precautions   Precautions Fall Risk;Swallow Precautions ( See Comments)   Pain 0 - 10 Group   Therapist Pain Assessment Post Activity Pain Same as Prior to Activity;Nurse Notified   Cognition    Cognition / Consciousness X   Level of Consciousness Alert   Comments Pts cognition seems to be improving, willing and motivated to participate in session   Balance   Sitting Balance (Static) Fair   Sitting Balance (Dynamic) Fair -   Standing Balance (Static) Poor   Standing Balance (Dynamic) Poor -   Weight Shift Sitting Fair   Weight Shift Standing Poor   Skilled Intervention Verbal Cuing;Facilitation   Bed Mobility    Supine to Sit Contact Guard Assist   Scooting Supervised   Rolling Supervised   Skilled Intervention Verbal Cuing;Facilitation   Comments HOB slightly elevated, log roll technique   Activities of Daily Living   Grooming Supervision;Seated   Upper Body Dressing Minimal Assist   Lower Body Dressing Maximal Assist   Toileting Total Assist   Skilled Intervention Verbal Cuing    How much help from another person does the patient currently need...   Putting on and taking off regular lower body clothing? 2   Bathing (including washing, rinsing, and drying)? 2   Toileting, which includes using a toilet, bedpan, or urinal? 2   Putting on and taking off regular upper body clothing? 3   Taking care of personal grooming such as brushing teeth? 3   Eating meals? 3   6 Clicks Daily Activity Score 15   Functional Mobility   Sit to Stand Moderate Assist   Bed, Chair, Wheelchair Transfer Moderate Assist   Toilet Transfers Moderate Assist   Transfer Method Stand Step   Mobility bed mobility, supine rest break, transfer to BSC, transfer to bedside chair   Skilled Intervention Verbal Cuing   Activity Tolerance   Sitting in Chair left seated in chair  (10 min BSC)   Sitting Edge of Bed 20 min   Standing 5 min   Short Term Goals   Short Term Goal # 1 Pt will complete ADL transfers with mod A   Goal Outcome # 1 Goal met, new goal added   Short Term Goal # 1 B  Pt will complete ADL transfers with supervision   Short Term Goal # 2 Pt will complete gown change with supv   Goal Outcome # 2 Progressing as expected   Short Term Goal # 3 Pt will complete seated grooming with set-up   Goal Outcome # 3 Progressing as expected   Education Group   Education Provided Role of Occupational Therapist   Role of Occupational Therapist Patient Response Patient;Acceptance;Explanation;Verbal Demonstration   Anticipated Discharge Equipment and Recommendations   DC Equipment Recommendations Unable to determine at this time   Discharge Recommendations Recommend post-acute placement for additional occupational therapy services prior to discharge home   Interdisciplinary Plan of Care Collaboration   IDT Collaboration with  Nursing   Patient Position at End of Therapy Seated;Chair Alarm On;Call Light within Reach;Tray Table within Reach;Phone within Reach   Collaboration Comments RN updated

## 2022-06-21 NOTE — CARE PLAN
The patient is Stable - Low risk of patient condition declining or worsening    Shift Goals  Clinical Goals: Wound care, PT  Patient Goals: to rest  Family Goals: N/A    Progress made toward(s) clinical / shift goals:  vitals are stable    Patient is not progressing towards the following goals:

## 2022-06-21 NOTE — THERAPY
Physical Therapy   Daily Treatment     Patient Name: Jama Altman  Age:  66 y.o., Sex:  male  Medical Record #: [id number]  Today's Date: 6/21/2022     Precautions  Precautions: Fall Risk;Swallow Precautions ( See Comments)    Assessment    Pt demonstrates marked improvements from previous sessions. Pt does state that he got very lightheaded this morning while sitting in the chair. Pt demonstrated improved ability to perform STS at EOB with FWW, see below for details. Pt still fatigues quickly but is demonstrating improvements slowly with activity tolerance.     Plan    Continue current treatment plan.    DC Equipment Recommendations: Unable to determine at this time  Discharge Recommendations: Recommend post-acute placement for additional physical therapy services prior to discharge home         Objective       06/21/22 1439   Precautions   Precautions Fall Risk;Swallow Precautions ( See Comments)   Passive ROM Lower Body   Passive ROM Lower Body WDL   Active ROM Lower Body    Active ROM Lower Body  WDL   Strength Lower Body   Lower Body Strength  X   Comments B LE 4-/5   Sitting Lower Body Exercises   Sitting Lower Body Exercises Yes   Long Arc Quad 2 sets of 10   Marching 2 sets of 10   Other Exercises 2x10, feet together seated hip IR/ER   Home Exercise Program   Comments Pt educated on increasing HOB throughout day to decrease sx's of orthostatic hypotension. Added SAQ to HEP.   Neuro-Muscular Treatments   Neuro-Muscular Treatments Compensatory Strategies;Facilitation;Sequencing   Comments at EOB to prepare for STS   Balance   Sitting Balance (Static) Fair   Sitting Balance (Dynamic) Fair -   Standing Balance (Static) Poor +   Standing Balance (Dynamic) Poor   Weight Shift Sitting Fair   Weight Shift Standing Poor   Skilled Intervention Compensatory Strategies;Facilitation;Verbal Cuing;Tactile Cuing   Comments Improving balance at EOB and standing.   Gait Analysis   Gait Level Of Assist Minimal Assist    Assistive Device Front Wheel Walker   Distance (Feet) 2   # of Times Distance was Traveled 1   Deviation Step To;Decreased Base Of Support;Shuffled Gait;Decreased Heel Strike;Decreased Toe Off   Weight Bearing Status No restrictions   Skilled Intervention Compensatory Strategies;Tactile Cuing;Verbal Cuing   Bed Mobility    Supine to Sit Contact Guard Assist   Sit to Supine Minimal Assist   Scooting Supervised   Rolling Supervised   Skilled Intervention Verbal Cuing;Compensatory Strategies   Functional Mobility   Sit to Stand Minimal Assist  (from elevated bed height and with compensatory strategies to incoporate momentum into STS for ease)   Mobility sup to sit, EOB exercises, STS x3, lateral gait to HOB 2 feet with FWW, sit to sup   Skilled Intervention Compensatory Strategies;Facilitation;Verbal Cuing;Tactile Cuing   How much difficulty does the patient currently have...   Turning over in bed (including adjusting bedclothes, sheets and blankets)? 4   Sitting down on and standing up from a chair with arms (e.g., wheelchair, bedside commode, etc.) 1   Moving from lying on back to sitting on the side of the bed? 3   How much help from another person does the patient currently need...   Moving to and from a bed to a chair (including a wheelchair)? 2   Need to walk in a hospital room? 2   Climbing 3-5 steps with a railing? 1   6 clicks Mobility Score 13   Activity Tolerance   Sitting Edge of Bed 20 min   Standing less than 5 minutes total   Short Term Goals    Short Term Goal # 1 Pt will perform supine <> sit without bed features with SPV within 6 visits in order to progress toward PLOF   Goal Outcome # 1 Progressing as expected   Short Term Goal # 2 Pt will perform STS with FWW and min A within 6 visits in order to progress OOB mobility   Goal Outcome # 2 Goal met   Short Term Goal # 3 Pt will perform functional transfers with FWW and min A within 6 visits in order to progress OOB mobility   Goal Outcome # 3  Progressing as expected   Short Term Goal # 4 Pt will ambulate 100 ft with FWW and mod A within 6 visits in order to progress functional mobility   Goal Outcome # 4 Progressing as expected   Education Group   Education Provided Role of Physical Therapist   Role of Physical Therapist Patient Response Patient;Acceptance;Explanation;Verbal Demonstration   Anticipated Discharge Equipment and Recommendations   DC Equipment Recommendations Unable to determine at this time   Discharge Recommendations Recommend post-acute placement for additional physical therapy services prior to discharge home   Interdisciplinary Plan of Care Collaboration   IDT Collaboration with  Nursing;Family / Caregiver;Occupational Therapist   Patient Position at End of Therapy In Bed;Phone within Reach;Tray Table within Reach;Call Light within Reach   Collaboration Comments RN updated on session   Session Information   Date / Session Number  6/21-8(2/5, 6/26)

## 2022-06-21 NOTE — DISCHARGE PLANNING
Agency/Facility Name: [facility] ([facility])  Spoke To: Silvana (if Silvana is not available, ask for Loan) [phone]   Outcome: Reviewing referral and will verify pt's insurance.  DPA to follow up tomorrow.

## 2022-06-21 NOTE — DISCHARGE PLANNING
@ [phone]  Agency/Facility Name: [facility]  Outcome: DPA received a voice message from [facility]. Referral accepted.

## 2022-06-21 NOTE — DISCHARGE PLANNING
[facility] T[facility] chart review completed. Collaborated with Lydia ISIDRO. Note that pt does appear to be progressing well with therapy at this time and appreciate therapy recs and updated tx notes. Plan as prior for dc to SNF once medically cleared. Noted SNF choice was expanded recently as well. TCN will continue to follow and collaborate with discharge planning team as additional post acute needs arise. Thank you.    Previously completed:  - Orders received for: PT, OT, SLP -> rec placement  - Choice forms: HH, SNF, IRF (rehab essentially declines at this time; more SNF appropriate per review)  - [facility] introduced (Y), referral (not sent).

## 2022-06-21 NOTE — PROGRESS NOTES
Hospital Medicine Daily Progress Note    Date of Service  6/21/2022    Chief Complaint  Jama Altman is a 66 y.o. male admitted 5/24/2022 with AMS and fall    Hospital Course  66 y.o. male w/ HTN, DLD, diabetes,  polycystic kidney dz with a renal transplant 9/10/10 and on immunosuppression but has CKD, ADELFO who presented 5/24/2022 with altered mentation.  There was initial concern that he had fallen at home and struck his head.  In the ER he was in atrial fibrillation with a rapid rate of 120-160.  He was given a fluid bolus and initiated on norepinephrine.  He was admitted for severe septic shock likely related to a soft tissue infection of the right leg from where he bumped into something several days ago. During admit he was found to have E.coli bacteremia and right leg cellulitis.  Spinal fluid ws negative for meningitis.  On 5/26 the patient had respiratory distress and was intubated. On 5/28 the patient had PEA arrest and had CPR.  A right knee aspiration was performed and fluid analysis was not showing infection.  On 6/1 the patient was extubated.  The patient had CRRT initiated on 5/27 and daily HD on 5/28 per nephrology notes. Patient shows sign of recovery and has been off dialysis for a few days.  E. coli bacteremia, source likely RLE cellulitis. ID consulted.  Completed iv meropenem with stop date 6/6/2022.     SANKET resolved, Cre is back to his baseline 1.5s  Respiratory distress resolved, off oxygen    Family meeting was held on 6/17 at bedside per the son's request.  All questions were answered.  Care plan was discussed and updated with the plan son.  He is agreeable.       Angiogram done by Dr. Lopez, good perfusion and no need for revasculariztion.  I reconsulted vascular surgery Dr. Hercules, who evaluated the patient.     Interval Problem Update  Patient was seen and examined at bedside.  Earlier this morning patient had episode of hypotension systolic in 60s after getting out of bed and walking.   Patient reporting feeling dizzy.  Patient evaluated bedside is sitting comfortably on chair.  I have ordered small IV fluid bolus with appropriate response.  I have also started midodrine to help with her blood pressure.  Patient is currently pending    Consultants/Specialty  Infectious disease  Critical care medicine  Nephrology  Vascular     Code Status  Full Code    Disposition  SNF    Review of Systems  All systems reviewed and negative except as noted per above.    Physical Exam  Temp:  [36.2 °C (97.1 °F)-37.1 °C (98.7 °F)] 36.7 °C (98 °F)  Pulse:  [70-97] 85  Resp:  [16-18] 16  BP: ()/(39-72) 104/61  SpO2:  [91 %-97 %] 93 %    General appearance: NAD, conversant, family at bedside  Eyes: anicteric sclerae, moist conjunctivae; no lid-lag; PERRLA, noted subconjunctival hemorrhage, pronounce on R eye   HENT: Atraumatic; oropharynx clear with moist mucous membranes and no mucosal ulcerations; normal hard and soft palate  Neck: Trachea midline; FROM, supple, no thyromegaly or lymphadenopathy  Lungs: CTA, with normal respiratory effort and no intercostal retractions  CV: RRR, no MRGs  Abdomen: Soft, non-tender; no masses or HSM  Extremities: RLE erythema with clean dressing  Skin: Normal temperature, turgor and texture; no rash, ulcers or subcutaneous nodules  Psych: Answering questions      Current Facility-Administered Medications:   •  midodrine (PROAMATINE) tablet 5 mg, 5 mg, Oral, TID WITH MEALS, Rich Bolton M.D., 5 mg at 06/21/22 1021  •  senna-docusate (PERICOLACE or SENOKOT S) 8.6-50 MG per tablet 2 Tablet, 2 Tablet, Oral, BID PRN **AND** polyethylene glycol/lytes (MIRALAX) PACKET 1 Packet, 1 Packet, Oral, QDAY PRN **AND** [DISCONTINUED] magnesium hydroxide (MILK OF MAGNESIA) suspension 30 mL, 30 mL, Oral, QDAY PRN **AND** bisacodyl (DULCOLAX) suppository 10 mg, 10 mg, Rectal, QDAY PRN, Suzi Gandara M.D.  •  [Held by provider] metoprolol tartrate (LOPRESSOR) tablet 12.5 mg, 12.5 mg, Oral, TWICE DAILY,  Timo Muñoz M.D., 12.5 mg at 06/20/22 1807  •  insulin regular (HumuLIN R,NovoLIN R) injection, 3-14 Units, Subcutaneous, 4X/DAY ACHS, 3 Units at 06/20/22 2055 **AND** POC blood glucose manual result, , , Q AC AND BEDTIME(S) **AND** NOTIFY MD and PharmD, , , Once **AND** Administer 20 grams of glucose (approximately 8 ounces of fruit juice) every 15 minutes PRN FSBG less than 70 mg/dL, , , PRN **AND** dextrose 50% (D50W) injection 25 g, 25 g, Intravenous, Q15 MIN PRN, Linsey M Wegener, A.P.R.N.  •  acetaminophen (Tylenol) tablet 650 mg, 650 mg, Oral, Q6HRS PRN, Mehrdad Laughlin M.D., 650 mg at 06/20/22 1732  •  tacrolimus (PROGRAF) capsule 1 mg, 1 mg, Oral, BID, Mehrdad Laughlin M.D., 1 mg at 06/21/22 0559  •  mycophenolate (CELLCEPT) capsule 500 mg, 500 mg, Oral, BID, Mehrdad Laughlin M.D., 500 mg at 06/21/22 0559  •  oxyCODONE immediate-release (ROXICODONE) tablet 5 mg, 5 mg, Oral, Q4HRS PRN, Mehrdad Laughlin M.D.  •  predniSONE (DELTASONE) tablet 5 mg, 5 mg, Oral, DAILY, Mehrdad Laughlin M.D., 5 mg at 06/21/22 0559  •  heparin injection 5,000 Units, 5,000 Units, Subcutaneous, Q12HRS, James Titus M.D., 5,000 Units at 06/21/22 0559  •  heparin injection 2,800 Units, 2,800 Units, Intracatheter, DIALYSIS PRN, James Sheppard M.D., 2,800 Units at 06/02/22 1400  •  Respiratory Therapy Consult, , Nebulization, Continuous RT, Reuben Swartz M.D.  •  sodium chloride (OCEAN) 0.65 % nasal spray 2 Spray, 2 Spray, Nasal, Q2HRS PRN, Kayy Hopkins M.D., 2 [place] at 06/14/22 1742  •  dakins 0.125% (1/4 strength) topical soln, , Topical, BID, Suzi Gandara M.D., 1 mL at 06/20/22 2057  •  HYDROmorphone (Dilaudid) injection 0.5 mg, 0.5 mg, Intravenous, Q2HRS PRN, Tracey Whitfield M.D., 0.5 mg at 05/31/22 0059      Fluids    Intake/Output Summary (Last 24 hours) at 6/21/2022 1311  Last data filed at 6/21/2022 0930  Gross per 24 hour   Intake --   Output 900 ml   Net -900 ml       Laboratory      Recent Labs     06/19/22  0017  06/20/22  0035   SODIUM 136 135   POTASSIUM 4.3 4.2   CHLORIDE 105 103   CO2 21 23   GLUCOSE 102* 93   BUN 29* 26*   CREATININE 1.41* 1.42*   CALCIUM 8.2* 8.1*                   Imaging  DX-CHEST-PORTABLE (1 VIEW)   Final Result      1.  Interval improvement in bilateral pulmonary opacities, likely improving edema.   2.  Bilateral infrahilar atelectasis versus consolidations persist. No effusions.         CT-HEAD W/O   Final Result      1.  Cerebral atrophy.      2.  Bilateral mastoid effusions.      3.  Otherwise, Head CT without contrast within normal limits. No evidence of acute intracranial hemorrhage or mass lesion.         DX-ABDOMEN FOR TUBE PLACEMENT   Final Result      Enteric feeding tube terminates with the tip projecting over the expected location of the 2nd-3rd portion of the duodenum.      DX-CHEST-LIMITED (1 VIEW)   Final Result      1.  Bibasilar underinflation atelectasis which could obscure an additional process. This is unchanged.   2.  Interstitial opacities likely pulmonary edema   3.  Persistently enlarged cardiac silhouette      DX-CHEST-LIMITED (1 VIEW)   Final Result      1.  Hypoinflation with mildly increased left basilar atelectasis.   2.  Removal of endotracheal tube.      DX-CHEST-LIMITED (1 VIEW)   Final Result         No significant interval change.      DX-CHEST-LIMITED (1 VIEW)   Final Result      Stable areas of patchy atelectasis/consolidation.      DX-CHEST-PORTABLE (1 VIEW)   Final Result      Stable chest x-ray findings.      DX-CHEST-PORTABLE (1 VIEW)   Final Result         1.  Pulmonary edema and/or infiltrates, somewhat increased particularly in the right lung base compared to prior study.   2.  Cardiomegaly      CT-ABDOMEN-PELVIS W/O   Final Result         Limited noncontrast exam      1. Long segment wall thickening from the descending colon to the sigmoid colon could relate to underdistention or colitis. Air-fluid level in the more proximal colon is likely diarrheal  disease.      2. Right lower quadrant transplant kidney with retained prior contrast, likely secondary to renal failure/ATN/contrast nephropathy. No hydronephrosis.      Absent right kidney. Polycystic left kidney.      CT-EXTREMITY, LOWER W/O RIGHT   Final Result      1. Postsurgical changes of right total knee arthroplasty.   2. Right knee joint effusion with thickening of the joint capsule and surrounding soft tissue edema.   3. Circumferential edema involving the soft tissues of the right lower leg with an anterior soft tissue defect and with areas of clustering on the medial right lower leg skin surface.   4. Arteriosclerosis.      DX-CHEST-PORTABLE (1 VIEW)   Final Result      1.  Unchanged position of supporting devices are visualized extent   2.  No visible pneumothorax following chest compressions   3.  Unchanged atelectasis and possible superimposed interstitial pulmonary edema or atypical pneumonia      US-EXTREMITY ARTERY LOWER UNILAT RIGHT   Final Result      US-MIRELLA SINGLE LEVEL BILAT   Final Result      US-EXTREMITY ARTERY LOWER BILAT W/MIRELLA (COMBO)   Final Result      DX-CHEST-PORTABLE (1 VIEW)   Final Result         1.  Pulmonary edema and/or infiltrates, somewhat increased particularly in the right lung base compared to prior study.   2.  Cardiomegaly      DX-ABDOMEN FOR TUBE PLACEMENT   Final Result      1. The tip of the feeding tube terminates over the junction of the gastric pylorus and duodenal bulb.   2. The remainder is stable.      DX-CHEST-PORTABLE (1 VIEW)   Final Result      1. Interval decrease in size of the right pleural effusion.   2. No left pleural effusion.   3. No visible pneumothorax status post bronchoscopy.   4. Interval placement of a Dobbhoff feeding tube.   5. Improving parenchymal loss in the right lung base, incompletely resolved.   6. The remainder is stable.      DX-CHEST-PORTABLE (1 VIEW)   Final Result      1. Interval development of a moderate right pleural effusion  after placement of a left internal jugular dialysis catheter, abutting the right lateral wall of the right atrium. Verification of line position with contrast injection under fluoroscopy is    offered.   2. Improved perihilar atelectasis.   3. The remainder is stable.      I, Dr. Matthew Brown, discussed the results of this examination directly by phone with Dr. Reuben Swartz on 5/26/2022 at 0855 hours.      EC-ECHOCARDIOGRAM COMPLETE W/ CONT   Final Result      DX-CHEST-LIMITED (1 VIEW)   Final Result         1. Right internal jugular central venous access catheter placed in the interval terminates over the upper aspect of the right atrium. No postprocedure visible pneumothorax.   2. Stable patchy parenchymal opacities in the lungs, with a stable small left pleural effusion.      DX-CHEST-PORTABLE (1 VIEW)   Final Result         1.  Pulmonary edema and/or infiltrates are identified, which are stable since the prior exam.   2.  Trace bilateral pleural effusions   3.  Cardiomegaly      CT-EXTREMITY, LOWER W/O RIGHT   Final Result      1.  Status post right total knee replacement.      2.  Diffuse atherosclerotic calcification of the arterial system of the right lower extremity suspicious for diabetes mellitus.      3.  Soft tissue attenuation in the subcutaneous tissues of the mid to distal lower leg and foot suspicious for cellulitis.      4.  No evidence of soft tissue ulceration in the right lower leg or foot.      5.  No evidence of acute osteomyelitis in the right lower leg or foot.      6.  Small subcutaneous abscesses cannot be excluded without the use of intravenous contrast.      DX-CHEST-PORTABLE (1 VIEW)   Final Result      Left internal jugular central venous access catheter terminates over a persistent left superior vena cava. No postprocedure visible pneumothorax.      The remainder is stable.      DX-TIBIA AND FIBULA RIGHT   Final Result      1. Right lower leg soft tissue swelling.   2. No acute  fracture or subluxation.   3. Postsurgical changes of right total knee arthroplasty.      US-ABDOMEN F.A.S.T. LTD (FOR ED USE ONLY)   Final Result      No free fluid seen in all 4 quadrants.      Negative FAST scan.            CT-ABDOMEN-PELVIS WITH   Final Result      1. No acute posttraumatic findings in the abdomen or pelvis.   2. Bibasilar subsegmental atelectasis.   3. Right pelvic transplant kidney without hydronephrosis.   4. Polycystic left kidney.   5. Diverticulosis without diverticulitis. Normal appendix.      CT-CTA CHEST PULMONARY ARTERY W/ RECONS   Final Result      1.  No CT evidence of pulmonary emboli.      2.  Bibasilar atelectasis.      3.  No evidence of rib fracture.      4. Diffuse coronary artery calcification.      CT-HEAD W/O   Final Result      1.  Cerebral atrophy.      2.  Otherwise, Head CT without contrast within normal limits. No evidence of acute cerebral infarction, hemorrhage or mass lesion.         CT-CSPINE WITHOUT PLUS RECONS   Final Result      1.  Moderate osteoarthritic changes at the C6-7 level with disc space narrowing and marginal osteophytosis. Further there is moderate cervical spondylotic changes at this level.      2.  No evidence of cervical spine fracture and/or subluxation.      DX-PELVIS-1 OR 2 VIEWS   Final Result      1.  Unremarkable single AP view of the pelvis.      DX-CHEST-LIMITED (1 VIEW)   Final Result      1.  Curvilinear perihilar opacities likely atelectasis and/or parenchymal scarring.      2.  Mild cardiomegaly.      IR-EXTREMITY ANGIOGRAM-UNILATERAL RIGHT    (Results Pending)        Assessment/Plan        Hypotension: A/P  -Like orthostatic in nature   -S/P IVF total of 500 ml.   -Started on midodrine 5 mg TID      * Non-healing wound of right lower extremity  Assessment & Plan  RLE Arterial ultrasound 5/2022 showed mild arterial insufficiency, no significant stenosis or occlusion   S/p debridement 5/24, 6/16 by wound care  Wound care following,  recommended plastic surgeon consult  I talked to plastic surgeon , vascular surgery evaluation prior to any surgery.     Angiogram done by Dr. Lopez, good perfusion and no need for revasculariztion.    I reconsulted vascular surgery Dr. Hercules, who evaluated the patient.                 Dysphagia  Assessment & Plan  SLP following   advanced diet per recommendation    Improving    Cellulitis of right lower extremity  Assessment & Plan  Completed 10 days course of IV meropenem for bacteremia on 6/6 per ID        Hyperkalemia  Assessment & Plan  Low potassium diet  On [place]  Continue to monitor    Resolved    Knee effusion, right  Assessment & Plan  s/p bedside right knee joint aspiration on 5/28.  Synovial fluid analysis reveals hazy priscila fluid, 2638 WBCs with PMN predominance and no crystals seen.  Fluid not consistent with infection.  Culture neg    Cardiac arrest (HCC)  Assessment & Plan  S/p PEA  Monitor labs, vitals.    Acute metabolic encephalopathy  Assessment & Plan  Improving.  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    Acute on chronic systolic heart failure (HCC)- (present on admission)  Assessment & Plan  With most recent ejection fraction 35%  Be conservative with IV fluid  On beta-blocker, ACEI on hold due to SANKET    Continue to monitor      Bacteremia due to Escherichia coli- (present on admission)  Assessment & Plan  Blood culture pos E coli on 5/24. Repeated blood culture negative to date. Source likely sec to RLE cellulitis  Completed 10 days course of IV meropenem (started on 5/28 given new fever and worsening chest x-ray) on 6/6  ID consulted     Acute respiratory failure with hypoxia (HCC)  Assessment & Plan  Intubated 5/26 and extubated 6/1  Mobilize as able  Respiratory protocol  Supplemental oxygen and  wean as able      Septic shock ([facility])- (present on admission)  Assessment & Plan  Sec to E.coli bacteremia from RLE cellulitis  Completed abx   Resolved    PAF (paroxysmal atrial fibrillation) ([facility])- (present on admission)  Assessment & Plan  Hx of pAFib   Hold BB given hypotension   [id number] - He is not on OAC at home. Discussed with him risks/benefits of initiation of anticoagulation. He agreed to start from tomorrow.     Type 2 diabetes mellitus with hyperlipidemia ([facility])- (present on admission)  Assessment & Plan  DC Long acting lantus   SSI   A1c 10.7    Thrombocytopenia ([facility])- (present on admission)  Assessment & Plan  Resolved     Acute renal failure superimposed on stage 3a chronic kidney disease ([facility])  Assessment & Plan  Nephrology consulting  Hemodialysis as per nephrology. Has been off HD as renal function improving, dialysis catheter has been removed  On IV fluid  Monitor vitals, I/O's, labs  Avoid nephrotoxins.  Immunosuppression for hx of renal transplant    Resolved, Cre is back to his baseline 1.5s       VTE prophylaxis: heparin ppx     I have performed a physical exam and reviewed and updated ROS and Plan today (6/21/2022). In review of yesterday's note (6/20/2022), there are no changes except as documented above

## 2022-06-22 VITALS
WEIGHT: 258.82 LBS | HEART RATE: 87 BPM | RESPIRATION RATE: 16 BRPM | SYSTOLIC BLOOD PRESSURE: 101 MMHG | HEIGHT: 77 IN | DIASTOLIC BLOOD PRESSURE: 61 MMHG | BODY MASS INDEX: 30.56 KG/M2 | TEMPERATURE: 97.6 F | OXYGEN SATURATION: 94 %

## 2022-06-22 PROBLEM — I46.9 CARDIAC ARREST (HCC): Status: RESOLVED | Noted: 2022-05-30 | Resolved: 2022-06-22

## 2022-06-22 PROBLEM — R78.81 BACTEREMIA DUE TO ESCHERICHIA COLI: Status: RESOLVED | Noted: 2022-05-26 | Resolved: 2022-06-22

## 2022-06-22 PROBLEM — A41.9 SEPTIC SHOCK (HCC): Status: RESOLVED | Noted: 2022-05-24 | Resolved: 2022-06-22

## 2022-06-22 PROBLEM — J96.01 ACUTE RESPIRATORY FAILURE WITH HYPOXIA (HCC): Status: RESOLVED | Noted: 2022-05-26 | Resolved: 2022-06-22

## 2022-06-22 PROBLEM — R13.10 DYSPHAGIA: Status: RESOLVED | Noted: 2022-06-14 | Resolved: 2022-06-22

## 2022-06-22 PROBLEM — B96.20 BACTEREMIA DUE TO ESCHERICHIA COLI: Status: RESOLVED | Noted: 2022-05-26 | Resolved: 2022-06-22

## 2022-06-22 PROBLEM — R65.21 SEPTIC SHOCK (HCC): Status: RESOLVED | Noted: 2022-05-24 | Resolved: 2022-06-22

## 2022-06-22 LAB
GLUCOSE BLD STRIP.AUTO-MCNC: 140 MG/DL (ref 65–99)
GLUCOSE BLD STRIP.AUTO-MCNC: 73 MG/DL (ref 65–99)
GLUCOSE BLD STRIP.AUTO-MCNC: 98 MG/DL (ref 65–99)
GLUCOSE BLD STRIP.AUTO-MCNC: 98 MG/DL (ref 65–99)
SARS-COV+SARS-COV-2 AG RESP QL IA.RAPID: NOTDETECTED
SPECIMEN SOURCE: NORMAL

## 2022-06-22 PROCEDURE — 99239 HOSP IP/OBS DSCHRG MGMT >30: CPT | Performed by: STUDENT IN AN ORGANIZED HEALTH CARE EDUCATION/TRAINING PROGRAM

## 2022-06-22 PROCEDURE — A9270 NON-COVERED ITEM OR SERVICE: HCPCS | Performed by: STUDENT IN AN ORGANIZED HEALTH CARE EDUCATION/TRAINING PROGRAM

## 2022-06-22 PROCEDURE — 87426 SARSCOV CORONAVIRUS AG IA: CPT

## 2022-06-22 PROCEDURE — 700111 HCHG RX REV CODE 636 W/ 250 OVERRIDE (IP): Performed by: HOSPITALIST

## 2022-06-22 PROCEDURE — 700102 HCHG RX REV CODE 250 W/ 637 OVERRIDE(OP): Performed by: STUDENT IN AN ORGANIZED HEALTH CARE EDUCATION/TRAINING PROGRAM

## 2022-06-22 PROCEDURE — 700111 HCHG RX REV CODE 636 W/ 250 OVERRIDE (IP): Performed by: STUDENT IN AN ORGANIZED HEALTH CARE EDUCATION/TRAINING PROGRAM

## 2022-06-22 PROCEDURE — 92526 ORAL FUNCTION THERAPY: CPT

## 2022-06-22 PROCEDURE — 82962 GLUCOSE BLOOD TEST: CPT | Mod: 91

## 2022-06-22 RX ORDER — ONDANSETRON 4 MG/1
4 TABLET, ORALLY DISINTEGRATING ORAL ONCE
Status: COMPLETED | OUTPATIENT
Start: 2022-06-22 | End: 2022-06-22

## 2022-06-22 RX ORDER — MIDODRINE HYDROCHLORIDE 5 MG/1
5 TABLET ORAL 2 TIMES DAILY
Status: ON HOLD
Start: 2022-06-22 | End: 2022-07-06

## 2022-06-22 RX ORDER — SODIUM HYPOCHLORITE 1.25 MG/ML
SOLUTION TOPICAL
Status: SHIPPED
Start: 2022-06-22 | End: 2022-06-30

## 2022-06-22 RX ORDER — MYCOPHENOLATE MOFETIL 250 MG/1
500 CAPSULE ORAL 2 TIMES DAILY
Qty: 20 CAPSULE | Refills: 0 | Status: SHIPPED
Start: 2022-06-22 | End: 2022-07-25

## 2022-06-22 RX ORDER — PREDNISONE 5 MG/1
5 TABLET ORAL DAILY
Qty: 30 TABLET | Refills: 0 | Status: SHIPPED
Start: 2022-06-23

## 2022-06-22 RX ORDER — TACROLIMUS 1 MG/1
1 CAPSULE ORAL 2 TIMES DAILY
Qty: 60 CAPSULE | Refills: 3 | Status: ON HOLD
Start: 2022-06-22 | End: 2024-03-16

## 2022-06-22 RX ADMIN — PREDNISONE 5 MG: 5 TABLET ORAL at 05:04

## 2022-06-22 RX ADMIN — MIDODRINE HYDROCHLORIDE 5 MG: 5 TABLET ORAL at 08:04

## 2022-06-22 RX ADMIN — APIXABAN 5 MG: 5 TABLET, FILM COATED ORAL at 08:04

## 2022-06-22 RX ADMIN — ONDANSETRON 4 MG: 4 TABLET, ORALLY DISINTEGRATING ORAL at 12:07

## 2022-06-22 RX ADMIN — MYCOPHENOLATE MOFETIL 500 MG: 250 CAPSULE ORAL at 05:03

## 2022-06-22 RX ADMIN — TACROLIMUS 1 MG: 1 CAPSULE ORAL at 05:04

## 2022-06-22 RX ADMIN — DAKIN'S SOLUTION 0.125% (QUARTER STRENGTH) 473 ML: 0.12 SOLUTION at 05:03

## 2022-06-22 ASSESSMENT — PAIN DESCRIPTION - PAIN TYPE: TYPE: ACUTE PAIN

## 2022-06-22 NOTE — PROGRESS NOTES
Pt finished working with OT and was placed in chair by bedside. Pt reported vision changes and light headedness. Blood sugar was assessed and was 67 and was given juices to bring up blood sugar. After drinking the juices and reevaluating 15 minutes later, blood sugars were 109 but symptoms were unresolved. Vitals were checked and BP was 62/45. Provider was notified of results and came to bedside, and shortly after a rapid response nurse came to assist. Provider ordered 500mL of NS to be rapidly infused to increase BP. Overtime, BP was increased and symptoms resolved. Midodrine was ordered by provider to help increase pt's BP.

## 2022-06-22 NOTE — DISCHARGE PLANNING
Agency/Facility Name: [facility]  Outcome: DPA left a voicemail regarding bed availability for today, DPA requesting call back.    0905:  Agency/Facility Name: [facility]  Spoke To: Han  Outcome: TASHIA was notified [facility] has bed availability, [facility] to call DPA back regarding details and transport time.     0954:  Agency/Facility Name: [facility]  Spoke To: Han  Outcome: T[facility] was notified [facility] is working on bed availability for PT, [facility] will back regarding bed availability for today.     1016:   Agency[facility]Facility Name: [facility]  Spoke To: Han  Outcome: [facility] was notified [facility] will be accepting Pt and picking up Pt for transport via [facility] for today @ 1200.     RN CM Notified.     1125:  Agency/Facility Name: [facility]  Spoke To: Han  Outcome: TASHIA was notified of a confirmed transport time of 1200 and to inform Pt he will have to bring his transplant meds with him to SNF.     RN CM notified.

## 2022-06-22 NOTE — CARE PLAN
The patient is Stable - Low risk of patient condition declining or worsening    Shift Goals  Clinical Goals: wound care, PT  Patient Goals: rest  Family Goals: N/A    Progress made toward(s) clinical / shift goals:        Problem: Knowledge Deficit - Standard  Goal: Patient and family/care givers will demonstrate understanding of plan of care, disease process/condition, diagnostic tests and medications  Outcome: Progressing     Problem: Pain - Standard  Goal: Alleviation of pain or a reduction in pain to the patient’s comfort goal  Outcome: Progressing     Problem: Skin Integrity  Goal: Skin integrity is maintained or improved  Outcome: Progressing     Problem: Fall Risk  Goal: Patient will remain free from falls  Outcome: Progressing     Problem: Discharge Barriers/Planning  Goal: Patient's continuum of care needs are met  Outcome: Progressing     Problem: Urinary Elimination  Goal: Establish and maintain regular urinary output  Outcome: Progressing     Problem: Mobility  Goal: Patient's capacity to carry out activities will improve  Outcome: Progressing     Problem: Self Care  Goal: Patient will have the ability to perform ADLs independently or with assistance (bathe, groom, dress, toilet and feed)  Outcome: Progressing     Problem: Infection - Standard  Goal: Patient will remain free from infection  Outcome: Progressing     Problem: Wound/ / Incision Healing  Goal: Patient's wound/surgical incision will decrease in size and heals properly  Outcome: Progressing       Patient is not progressing towards the following goals:

## 2022-06-22 NOTE — DISCHARGE SUMMARY
Discharge Summary    CHIEF COMPLAINT ON ADMISSION  Chief Complaint   Patient presents with   • GLF       Reason for Admission  EMS      Admission Date  5/24/2022    CODE STATUS  Full Code    HPI & HOSPITAL COURSE  This is a 66 y.o. male here with altered mental status and fall.  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  Cultures grew E. coli bacteremia repeat cultures were negative.  Patient had episode of PEA arrest extubated on 6/1/2022.  Angiogram done by vascular recommended no need for revascularization.  He was also found to be in atrial fibrillation.  Patient has an elevated ChadsVasc and risks and benefits were discussed with patient regarding starting oral anticoagulation.  Patient opted to start on oral anticoagulation.  Patient is aware of risks including life-threatening bleed.  He still wishes to proceed with anticoagulation.  Patient had an episode of hypotension and metoprolol was stopped.  ACE-I is on hold as he was on dialysis and renal function is improving. He is currently on midodrine.      Therefore, he is discharged in good and stable condition to skilled nursing facility.    The patient met 2-midnight criteria for an inpatient stay at the time of discharge.    Discharge Date  6/22/2022    FOLLOW UP ITEMS POST DISCHARGE  F/u plastics outpatient   F/u cardiology   Monitor H/H given patient started on OAC for A. Fib  Consider starting BB if blood pressure is stable   Consider starting ACE-I if renal function improves or remains stable.         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 [facility] None   8/9/2022  8:00 AM [facility]     No follow-up provider specified.    MEDICATIONS ON DISCHARGE     Medication List      START taking these medications      Instructions   apixaban 5mg Tabs  Commonly known as: ELIQUIS   Take 1 Tablet by mouth 2 times a day. Indications: Thromboembolism secondary to Atrial Fibrillation  Dose: 5 mg     dakins 0.125% (1/4 strength) 0.125 % Soln   RLE & R FOOT: Nursing to remove entire dressing. Cleanse wound with wound cleanser and gauze. Pat dry. Prep all periwounds with THIN layer or barrier paste.     midodrine 5 MG Tabs  Commonly known as: PROAMATINE   Take 1 Tablet by mouth 2 times a day.  Dose: 5 mg        CHANGE how you take these medications      Instructions   mycophenolate 250 MG Caps  What changed: when to take this  Commonly known as: CELLCEPT   Take 2 Capsules by mouth 2 times a day.  Dose: 500 mg     predniSONE 5 MG Tabs  Start taking on: June 23, 2022  What changed: additional instructions  Commonly known as: DELTASONE   Take 1 Tablet by mouth every day.  Dose: 5 mg     tacrolimus 1 MG Caps  What changed: when to take this  Commonly known as: PROGRAF   Take 1 Capsule by mouth 2  times a day.  Dose: 1 mg        CONTINUE taking these medications      Instructions   atorvastatin 80 MG tablet  Commonly known as: LIPITOR   Take 1 Tablet by mouth at bedtime.  Dose: 80 mg     glyBURIDE 5 MG Tabs  Commonly known as: DIABETA   Take 2 Tablets by mouth 2 times a day with meals.  Dose: 10 mg     SITagliptin 50 MG Tabs  Commonly known as: Januvia   Take 1 Tablet by mouth every day.  Dose: 50 mg        STOP taking these medications    gabapentin 300 MG Caps  Commonly known as: NEURONTIN     metoprolol tartrate 25 MG Tabs  Commonly known as: LOPRESSOR     omeprazole 20 MG delayed-release capsule  Commonly known as: PRILOSEC     pioglitazone 45 MG Tabs  Commonly known as: ACTOS     tadalafil 5 MG tablet  Commonly known as: Cialis     Tradjenta 5 MG Tabs tablet  Generic drug: linagliptin            Allergies  Allergies   Allergen Reactions   • Doxycycline Rash     Sweats and shakes: 9/28/17: Clarified allergy with patient. Allergy was in 1998 and he doesn't remember what happened. He thought the medication is for pain.  Tolerates doxycycline 9/2017       DIET  Orders Placed This Encounter   Procedures   • Diet Order Diet: Level 5 - Minced and Moist; Liquid level: Level 2 - Mildly Thick     Standing Status:   Standing     Number of Occurrences:   1     Order Specific Question:   Diet:     Answer:   Level 5 - Minced and Moist [24]     Order Specific Question:   Liquid level     Answer:   Level 2 - Mildly Thick       ACTIVITY  As tolerated.  Weight bearing as tolerated    CONSULTATIONS  Critical Care   Nephrology   Orthopedics   Vascular   PT/OT    PROCEDURES  Angiogram   R Knee arthrocentesis   While in ICU -   -A line   -Intubation   -Bronchoscopy with BAL   -Central line     LABORATORY  Lab Results   Component Value Date    SODIUM 135 06/20/2022    POTASSIUM 4.2 06/20/2022    CHLORIDE 103 06/20/2022    CO2 23 06/20/2022    GLUCOSE 93 06/20/2022    BUN 26 (H) 06/20/2022    CREATININE 1.42 (H) 06/20/2022     CREATININE 2.4 (H) 12/18/2006        Lab Results   Component Value Date    WBC 6.7 06/14/2022    HEMOGLOBIN 10.6 (L) 06/14/2022    HEMATOCRIT 34.1 (L) 06/14/2022    PLATELETCT 162 (L) 06/14/2022        Total time of the discharge process exceeds 35 minutes.

## 2022-06-22 NOTE — PROGRESS NOTES
Discharge packet was reviewed and given to pt. Personal belongings were given to pt.. Pt given was given zofran for N/V before transport. Pt transported via wheelchair with [facility].

## 2022-06-22 NOTE — DISCHARGE PLANNING
HTH/SCP TCN chart review completed. Collaborated with DWAINE Freeman. Per most recent chart review at time of writing this note, appears transport via [facility] for today @ 1200. Appreciate collaboration with [facility] CM with re: dc planning. TCN will continue to follow and collaborate with discharge planning team as additional post acute needs arise. Thank you.      Previously completed:  - Orders received for: PT, OT, SLP -> rec placement  - Choice forms: HH, SNF, IRF (rehab essentially declines at this time; more SNF appropriate per review)  - [facility] introduced (Y), referral (not sent).

## 2022-06-22 NOTE — CARE PLAN
The patient is Stable - Low risk of patient condition declining or worsening    Shift Goals  Clinical Goals: wound care, PT  Patient Goals: rest  Family Goals: N/A    Progress made toward(s) clinical / shift goals:  Pt was educated on their plan of care and their medications  Problem: Knowledge Deficit - Standard  Goal: Patient and family/care givers will demonstrate understanding of plan of care, disease process/condition, diagnostic tests and medications  Outcome: Progressing     Problem: Pain - Standard  Goal: Alleviation of pain or a reduction in pain to the patient’s comfort goal  Outcome: Progressing     Problem: Skin Integrity  Goal: Skin integrity is maintained or improved  Outcome: Progressing     Problem: Fall Risk  Goal: Patient will remain free from falls  Outcome: Progressing     Problem: Discharge Barriers/Planning  Goal: Patient's continuum of care needs are met  Outcome: Progressing     Problem: Urinary Elimination  Goal: Establish and maintain regular urinary output  Outcome: Progressing     Problem: Mobility  Goal: Patient's capacity to carry out activities will improve  Outcome: Progressing     Problem: Self Care  Goal: Patient will have the ability to perform ADLs independently or with assistance (bathe, groom, dress, toilet and feed)  Outcome: Progressing     Problem: Infection - Standard  Goal: Patient will remain free from infection  Outcome: Progressing     Problem: Wound/ / Incision Healing  Goal: Patient's wound/surgical incision will decrease in size and heals properly  Outcome: Progressing       Patient is not progressing towards the following goals:

## 2022-06-22 NOTE — THERAPY
Speech Language Pathology  Daily Treatment     Patient Name: Jama Altman  Age:  66 y.o., Sex:  male  Medical Record #: [id number]  Today's Date: 2022     Precautions: Fall Risk, Swallow Precautions ( See Comments)    Assessment  Pt was seen while in semi-ross's position. SLP reviewed results and recs from recent FEES. Rationale for swallow exercises was also explained and reviewed. Pt verbalized understanding and completed the followin.  Effortful swallow using ice chips x 50 given Elliott - throat clearing noted x3   2.  Chin Tuck Against Resistance (CTAR) x 50 given Elliott      Clinical Impressions  The pt presents with improved mentation and endurance this date. Given notable improvement since the pt's most recent FEES, pt appears appropriate for re-assessment to define swallow physiology and aspiration risk.       Recommendations  1.  Minced & Moist solids (MM5), Mildly thick liquids (MT2)   2.  Repeat instrumental swallow study (FEES) given h/o impaired laryngeal and pharyngeal sensory integrity.  Pt agreeable to complete.  2.  Swallowing Instructions & Precautions:   Supervision: Assist with meal tray set up  Positioning: Fully upright and midline during oral intake, Meals sitting upright in a chair, as tolerated  Medication: Whole with liquid  Strategies: Small bites/sips, Alternate bites and sips, Slow rate of intake, Multiple swallows (x 2) per bite/sip   Oral Care: Q4h  3.  SLP to follow for dysphagia management.      Plan  Continue current treatment plan.  Discharge Recommendations: Recommend post-acute placement for additional speech therapy services prior to discharge home    Subjective  RN cleared the pt for speech tx, no acute changes reported. Pt was received awake and alert. He was pleasant and cooperative, eager and motivated to participate. Pt requested diet advancement and reported that he has been consuming water without any issues. Per therapy notes, pt with notable improvement  "within the past week, able to ambulate to a chair. No visitors present.        06/22/22 1008   Charge Group   SLP Swallowing Dysfunction Treatment Swallowing Dysfunction Treatment   Total Treatment Time   SLP Time Spent Yes   SLP Swallowing Dysfunction Treatment (Mins) 21   Vitals   O2 Delivery Device None - Room Air   Pain 0 - 10 Group   Therapist Pain Assessment Post Activity Pain Same as Prior to Activity   Patient / Family Goals   Patient / Family Goal #1 \"water\"   Goal #1 Outcome Progressing as expected   Short Term Goals   Short Term Goal # 2 Pt will complete swallow exercises targeting BoT retraction given Elliott.   Goal Outcome # 2  Progressing as expected   Short Term Goal # 3 Pt will complete swallow exercises targeting pharyngeal constriction and LVC given Elliott.   Goal Outcome  # 3 Progressing as expected     "

## 2022-06-22 NOTE — DISCHARGE PLANNING
Case Management Discharge Planning    Admission Date: 5/24/2022  GMLOS: 12.4  ALOS: 29    6-Clicks ADL Score: 15  6-Clicks Mobility Score: 13  PT and/or OT Eval ordered: Yes  Post-acute Referrals Ordered: Yes  Post-acute Choice Obtained: Yes  Has referral(s) been sent to post-acute provider:  Yes      Anticipated Discharge Dispo: Discharge Disposition: D/T to SNF with Medicare cert in anticipation of skilled care (03)    DME Needed: No    Action(s) Taken: Updated Provider/Nurse on Discharge Plan, Acceptance Received, Completed PASSR/LOC, and OTHER: RN CM received notification of patient's acceptance to [facility] for skilled services. COBRA packet completed for discharge.    Escalations Completed: Provider, Ride Line, and Bedside RN    Medically Clear: Yes    Next Steps: f/u with DPA for bed availability and transportation time set up by accepting facility.    Barriers to Discharge: None    Is the patient up for discharge tomorrow: No, patient set for discharge today 06/22/2022 at 1200 to [facility] for skilled services.

## 2022-06-22 NOTE — DISCHARGE INSTRUCTIONS
Discharge Instructions    Discharged to group home by medical transportation with escort. Discharged via wheelchair, hospital escort: Yes.  Special equipment needed: Not Applicable    Be sure to schedule a follow-up appointment with your primary care doctor or any specialists as instructed.     Discharge Plan:   Diet Plan: Discussed  Activity Level: Discussed  Confirmed Follow up Appointment: Patient to Call and Schedule Appointment  Confirmed Symptoms Management: Discussed  Medication Reconciliation Updated: Yes    I understand that a diet low in cholesterol, fat, and sodium is recommended for good health. Unless I have been given specific instructions below for another diet, I accept this instruction as my diet prescription.   Other diet: N/A    Special Instructions: None    Is patient discharged on Warfarin / Coumadin?   No     Depression / Suicide Risk    As you are discharged from this [facility] facility, it is important to learn how to keep safe from harming yourself.    Recognize the warning signs:  Abrupt changes in personality, positive or negative- including increase in energy   Giving away possessions  Change in eating patterns- significant weight changes-  positive or negative  Change in sleeping patterns- unable to sleep or sleeping all the time   Unwillingness or inability to communicate  Depression  Unusual sadness, discouragement and loneliness  Talk of wanting to die  Neglect of personal appearance   Rebelliousness- reckless behavior  Withdrawal from people/activities they love  Confusion- inability to concentrate     If you or a loved one observes any of these behaviors or has concerns about self-harm, here's what you can do:  Talk about it- your feelings and reasons for harming yourself  Remove any means that you might use to hurt yourself (examples: pills, rope, extension cords, firearm)  Get professional help from the community (Mental Health, Substance Abuse, psychological counseling)  Do  not be alone:Call your Safe Contact- someone whom you trust who will be there for you.  Call your local CRISIS HOTLINE 849-3521 or 726-648-2602  Call your local Children's Mobile Crisis Response Team [place] (321) 888-2576 or www.[employer]  Call the toll free National Suicide Prevention Hotlines   National Suicide Prevention Lifeline 016-428-MCQN (4107)  [employer] Line Network 800-SUICIDE (938-9107)  Cellulitis, Adult    Cellulitis is a skin infection. The infected area is often warm, red, swollen, and sore. It occurs most often in the arms and lower legs. It is very important to get treated for this condition.  What are the causes?  This condition is caused by bacteria. The bacteria enter through a break in the skin, such as a cut, burn, insect bite, open sore, or crack.  What increases the risk?  This condition is more likely to occur in people who:  Have a weak body defense system (immune system).  Have open cuts, burns, bites, or scrapes on the skin.  Are older than 60 years of age.  Have a blood sugar problem (diabetes).  Have a long-lasting (chronic) liver disease (cirrhosis) or kidney disease.  Are very overweight (obese).  Have a skin problem, such as:  Itchy rash (eczema).  Slow movement of blood in the veins (venous stasis).  Fluid buildup below the skin (edema).  Have been treated with high-energy rays (radiation).  Use IV drugs.  What are the signs or symptoms?  Symptoms of this condition include:  Skin that is:  Red.  Streaking.  Spotting.  Swollen.  Sore or painful when you touch it.  Warm.  A fever.  Chills.  Blisters.  How is this diagnosed?  This condition is diagnosed based on:  Medical history.  Physical exam.  Blood tests.  Imaging tests.  How is this treated?  Treatment for this condition may include:  Medicines to treat infections or allergies.  Home care, such as:  Rest.  Placing cold or warm cloths (compresses) on the skin.  Hospital care, if the condition is very  bad.  Follow these instructions at home:  Medicines  Take over-the-counter and prescription medicines only as told by your doctor.  If you were prescribed an antibiotic medicine, take it as told by your doctor. Do not stop taking it even if you start to feel better.  General instructions    Drink enough fluid to keep your pee (urine) pale yellow.  Do not touch or rub the infected area.  Raise (elevate) the infected area above the level of your heart while you are sitting or lying down.  Place cold or warm cloths on the area as told by your doctor.  Keep all follow-up visits as told by your doctor. This is important.  Contact a doctor if:  You have a fever.  You do not start to get better after 1-2 days of treatment.  Your bone or joint under the infected area starts to hurt after the skin has healed.  Your infection comes back. This can happen in the same area or another area.  You have a swollen bump in the area.  You have new symptoms.  You feel ill and have muscle aches and pains.  Get help right away if:  Your symptoms get worse.  You feel very sleepy.  You throw up (vomit) or have watery poop (diarrhea) for a long time.  You see red streaks coming from the area.  Your red area gets larger.  Your red area turns dark in color.  These symptoms may represent a serious problem that is an emergency. Do not wait to see if the symptoms will go away. Get medical help right away. Call your local emergency services (911 in the [place] Do not drive yourself to the hospital.  Summary  Cellulitis is a skin infection. The area is often warm, red, swollen, and sore.  This condition is treated with medicines, rest, and cold and warm cloths.  Take all medicines only as told by your doctor.  Tell your doctor if symptoms do not start to get better after 1-2 days of treatment.  This information is not intended to replace advice given to you by your health care provider. Make sure you discuss any questions you have with your health  care provider.  Document Released: 06/05/2009 Document Revised: 05/09/2019 Document Reviewed: 05/09/2019  Elsevier Patient Education © 2020 [employer]  Hypotension  As your heart beats, it forces blood through your body. This force is called blood pressure. If you have hypotension, you have low blood pressure. When your blood pressure is too low, you may not get enough blood to your brain or other parts of your body. This may cause you to feel weak, light-headed, have a fast heartbeat, or even pass out (faint). Low blood pressure may be harmless, or it may cause serious problems.  What are the causes?  Blood loss.  Not enough water in the body (dehydration).  Heart problems.  Hormone problems.  Pregnancy.  A very bad infection.  Not having enough of certain nutrients.  Very bad allergic reactions.  Certain medicines.  What increases the risk?  Age. The risk increases as you get older.  Conditions that affect the heart or the brain and spinal cord (central nervous system).  Taking certain medicines.  Being pregnant.  What are the signs or symptoms?  Feeling:  Weak.  Light-headed.  Dizzy.  Tired (fatigued).  Blurred vision.  Fast heartbeat.  Passing out, in very bad cases.  How is this treated?  Changing your diet. This may involve eating more salt (sodium) or drinking more water.  Taking medicines to raise your blood pressure.  Changing how much you take (the dosage) of some of your medicines.  Wearing compression stockings. These stockings help to prevent blood clots and reduce swelling in your legs.  In some cases, you may need to go to the hospital for:  Fluid replacement. This means you will receive fluids through an IV tube.  Blood replacement. This means you will receive donated blood through an IV tube (transfusion).  Treating an infection or heart problems, if this applies.  Monitoring. You may need to be monitored while medicines that you are taking wear off.  Follow these instructions at home:  Eating  and drinking    Drink enough fluids to keep your pee (urine) pale yellow.  Eat a healthy diet. Follow instructions from your doctor about what you can eat or drink. A healthy diet includes:  Fresh fruits and vegetables.  Whole grains.  Low-fat (lean) meats.  Low-fat dairy products.  Eat extra salt only as told. Do not add extra salt to your diet unless your doctor tells you to.  Eat small meals often.  Avoid standing up quickly after you eat.  Medicines  Take over-the-counter and prescription medicines only as told by your doctor.  Follow instructions from your doctor about changing how much you take of your medicines, if this applies.  Do not stop or change any of your medicines on your own.  General instructions    Wear compression stockings as told by your doctor.  Get up slowly from lying down or sitting.  Avoid hot showers and a lot of heat as told by your doctor.  Return to your normal activities as told by your doctor. Ask what activities are safe for you.  Do not use any products that contain nicotine or tobacco, such as cigarettes, e-cigarettes, and chewing tobacco. If you need help quitting, ask your doctor.  Keep all follow-up visits as told by your doctor. This is important.  Contact a doctor if:  You throw up (vomit).  You have watery poop (diarrhea).  You have a fever for more than 2-3 days.  You feel more thirsty than normal.  You feel weak and tired.  Get help right away if:  You have chest pain.  You have a fast or uneven heartbeat.  You lose feeling (have numbness) in any part of your body.  You cannot move your arms or your legs.  You have trouble talking.  You get sweaty or feel light-headed.  You pass out.  You have trouble breathing.  You have trouble staying awake.  You feel mixed up (confused).  Summary  Hypotension is also called low blood pressure. It is when the force of blood pumping through your arteries is too weak.  Hypotension may be harmless, or it may cause serious  problems.  Treatment may include changing your diet and medicines, and wearing compression stockings.  In very bad cases, you may need to go to the hospital.  This information is not intended to replace advice given to you by your health care provider. Make sure you discuss any questions you have with your health care provider.  Document Released: 03/14/2011 Document Revised: 06/13/2019 Document Reviewed: 06/13/2019  Elsevier Patient Education © 2020 [employer] Inc[employer]

## 2022-06-22 NOTE — PROGRESS NOTES
Assumed pt care at 0700 . Pt is A&Ox 4 . Pt denies any pain.  Pt's belongings are nearby, bed is in the lowest position and locked. Hourly rounding in place.

## 2022-06-30 ENCOUNTER — HOSPITAL ENCOUNTER (INPATIENT)
Facility: MEDICAL CENTER | Age: 67
LOS: 5 days | DRG: 871 | End: 2022-07-06
Attending: EMERGENCY MEDICINE | Admitting: INTERNAL MEDICINE
Payer: MEDICARE

## 2022-06-30 ENCOUNTER — APPOINTMENT (OUTPATIENT)
Dept: RADIOLOGY | Facility: MEDICAL CENTER | Age: 67
DRG: 871 | End: 2022-06-30
Attending: EMERGENCY MEDICINE
Payer: MEDICARE

## 2022-06-30 DIAGNOSIS — R78.81 GRAM-NEGATIVE BACTEREMIA: ICD-10-CM

## 2022-06-30 DIAGNOSIS — B96.20 BACTEREMIA DUE TO ESCHERICHIA COLI: ICD-10-CM

## 2022-06-30 DIAGNOSIS — R78.81 BACTEREMIA DUE TO ESCHERICHIA COLI: ICD-10-CM

## 2022-06-30 DIAGNOSIS — A41.9 SEPSIS WITHOUT ACUTE ORGAN DYSFUNCTION, DUE TO UNSPECIFIED ORGANISM (HCC): ICD-10-CM

## 2022-06-30 DIAGNOSIS — I48.0 PAF (PAROXYSMAL ATRIAL FIBRILLATION) (HCC): ICD-10-CM

## 2022-06-30 LAB
ALBUMIN SERPL BCP-MCNC: 2.9 G/DL (ref 3.2–4.9)
ALBUMIN/GLOB SERPL: 1 G/DL
ALP SERPL-CCNC: 106 U/L (ref 30–99)
ALT SERPL-CCNC: 16 U/L (ref 2–50)
ANION GAP SERPL CALC-SCNC: 11 MMOL/L (ref 7–16)
APTT PPP: 37.3 SEC (ref 24.7–36)
AST SERPL-CCNC: 14 U/L (ref 12–45)
BASOPHILS # BLD AUTO: 0.3 % (ref 0–1.8)
BASOPHILS # BLD: 0.04 K/UL (ref 0–0.12)
BILIRUB SERPL-MCNC: 0.4 MG/DL (ref 0.1–1.5)
BUN SERPL-MCNC: 25 MG/DL (ref 8–22)
CALCIUM SERPL-MCNC: 7.8 MG/DL (ref 8.5–10.5)
CHLORIDE SERPL-SCNC: 101 MMOL/L (ref 96–112)
CO2 SERPL-SCNC: 21 MMOL/L (ref 20–33)
CREAT SERPL-MCNC: 1.98 MG/DL (ref 0.5–1.4)
EKG IMPRESSION: NORMAL
EOSINOPHIL # BLD AUTO: 0.01 K/UL (ref 0–0.51)
EOSINOPHIL NFR BLD: 0.1 % (ref 0–6.9)
ERYTHROCYTE [DISTWIDTH] IN BLOOD BY AUTOMATED COUNT: 48.9 FL (ref 35.9–50)
GFR SERPLBLD CREATININE-BSD FMLA CKD-EPI: 37 ML/MIN/1.73 M 2
GLOBULIN SER CALC-MCNC: 3 G/DL (ref 1.9–3.5)
GLUCOSE SERPL-MCNC: 155 MG/DL (ref 65–99)
HCT VFR BLD AUTO: 32.8 % (ref 42–52)
HGB BLD-MCNC: 10.1 G/DL (ref 14–18)
IMM GRANULOCYTES # BLD AUTO: 0.25 K/UL (ref 0–0.11)
IMM GRANULOCYTES NFR BLD AUTO: 1.7 % (ref 0–0.9)
INR PPP: 1.61 (ref 0.87–1.13)
LACTATE SERPL-SCNC: 2.1 MMOL/L (ref 0.5–2)
LYMPHOCYTES # BLD AUTO: 0.43 K/UL (ref 1–4.8)
LYMPHOCYTES NFR BLD: 2.9 % (ref 22–41)
MCH RBC QN AUTO: 27.1 PG (ref 27–33)
MCHC RBC AUTO-ENTMCNC: 30.8 G/DL (ref 33.7–35.3)
MCV RBC AUTO: 87.9 FL (ref 81.4–97.8)
MONOCYTES # BLD AUTO: 0.93 K/UL (ref 0–0.85)
MONOCYTES NFR BLD AUTO: 6.3 % (ref 0–13.4)
NEUTROPHILS # BLD AUTO: 13.07 K/UL (ref 1.82–7.42)
NEUTROPHILS NFR BLD: 88.7 % (ref 44–72)
NRBC # BLD AUTO: 0 K/UL
NRBC BLD-RTO: 0 /100 WBC
PLATELET # BLD AUTO: 247 K/UL (ref 164–446)
PMV BLD AUTO: 10.5 FL (ref 9–12.9)
POTASSIUM SERPL-SCNC: 5 MMOL/L (ref 3.6–5.5)
PROT SERPL-MCNC: 5.9 G/DL (ref 6–8.2)
PROTHROMBIN TIME: 18.8 SEC (ref 12–14.6)
RBC # BLD AUTO: 3.73 M/UL (ref 4.7–6.1)
SODIUM SERPL-SCNC: 133 MMOL/L (ref 135–145)
WBC # BLD AUTO: 14.7 K/UL (ref 4.8–10.8)

## 2022-06-30 PROCEDURE — 84484 ASSAY OF TROPONIN QUANT: CPT

## 2022-06-30 PROCEDURE — 85730 THROMBOPLASTIN TIME PARTIAL: CPT

## 2022-06-30 PROCEDURE — 85610 PROTHROMBIN TIME: CPT

## 2022-06-30 PROCEDURE — A9270 NON-COVERED ITEM OR SERVICE: HCPCS | Performed by: EMERGENCY MEDICINE

## 2022-06-30 PROCEDURE — 36415 COLL VENOUS BLD VENIPUNCTURE: CPT

## 2022-06-30 PROCEDURE — 83605 ASSAY OF LACTIC ACID: CPT

## 2022-06-30 PROCEDURE — 80053 COMPREHEN METABOLIC PANEL: CPT

## 2022-06-30 PROCEDURE — 87186 SC STD MICRODIL/AGAR DIL: CPT

## 2022-06-30 PROCEDURE — 85025 COMPLETE CBC W/AUTO DIFF WBC: CPT

## 2022-06-30 PROCEDURE — 87077 CULTURE AEROBIC IDENTIFY: CPT | Mod: 91

## 2022-06-30 PROCEDURE — 99291 CRITICAL CARE FIRST HOUR: CPT

## 2022-06-30 PROCEDURE — 96375 TX/PRO/DX INJ NEW DRUG ADDON: CPT

## 2022-06-30 PROCEDURE — 700111 HCHG RX REV CODE 636 W/ 250 OVERRIDE (IP): Performed by: EMERGENCY MEDICINE

## 2022-06-30 PROCEDURE — 700105 HCHG RX REV CODE 258: Performed by: EMERGENCY MEDICINE

## 2022-06-30 PROCEDURE — 87040 BLOOD CULTURE FOR BACTERIA: CPT

## 2022-06-30 PROCEDURE — 83880 ASSAY OF NATRIURETIC PEPTIDE: CPT

## 2022-06-30 PROCEDURE — 74176 CT ABD & PELVIS W/O CONTRAST: CPT | Mod: ME

## 2022-06-30 PROCEDURE — 71045 X-RAY EXAM CHEST 1 VIEW: CPT

## 2022-06-30 PROCEDURE — 93005 ELECTROCARDIOGRAM TRACING: CPT | Performed by: EMERGENCY MEDICINE

## 2022-06-30 PROCEDURE — 700102 HCHG RX REV CODE 250 W/ 637 OVERRIDE(OP): Performed by: EMERGENCY MEDICINE

## 2022-06-30 RX ORDER — ACETAMINOPHEN 500 MG
1000 TABLET ORAL ONCE
Status: COMPLETED | OUTPATIENT
Start: 2022-06-30 | End: 2022-06-30

## 2022-06-30 RX ORDER — SODIUM CHLORIDE, SODIUM LACTATE, POTASSIUM CHLORIDE, AND CALCIUM CHLORIDE .6; .31; .03; .02 G/100ML; G/100ML; G/100ML; G/100ML
1500 INJECTION, SOLUTION INTRAVENOUS ONCE
Status: COMPLETED | OUTPATIENT
Start: 2022-06-30 | End: 2022-07-01

## 2022-06-30 RX ORDER — GLYBURIDE 5 MG/1
10 TABLET ORAL
COMMUNITY
End: 2022-12-06 | Stop reason: SDUPTHER

## 2022-06-30 RX ORDER — ACETAMINOPHEN 325 MG/1
325-650 TABLET ORAL EVERY 4 HOURS PRN
COMMUNITY
End: 2022-07-25

## 2022-06-30 RX ORDER — GABAPENTIN 300 MG/1
600 CAPSULE ORAL
COMMUNITY
End: 2022-09-14

## 2022-06-30 RX ORDER — LINAGLIPTIN 5 MG/1
5 TABLET, FILM COATED ORAL DAILY
COMMUNITY
End: 2022-07-25

## 2022-06-30 RX ORDER — SODIUM CHLORIDE, SODIUM LACTATE, POTASSIUM CHLORIDE, AND CALCIUM CHLORIDE .6; .31; .03; .02 G/100ML; G/100ML; G/100ML; G/100ML
30 INJECTION, SOLUTION INTRAVENOUS ONCE
Status: DISCONTINUED | OUTPATIENT
Start: 2022-06-30 | End: 2022-06-30

## 2022-06-30 RX ORDER — CARBOXYMETHYLCELLULOSE SODIUM 5 MG/ML
1 SOLUTION/ DROPS OPHTHALMIC 3 TIMES DAILY
COMMUNITY
End: 2022-08-02

## 2022-06-30 RX ADMIN — SODIUM CHLORIDE, POTASSIUM CHLORIDE, SODIUM LACTATE AND CALCIUM CHLORIDE 1500 ML: 600; 310; 30; 20 INJECTION, SOLUTION INTRAVENOUS at 23:18

## 2022-06-30 RX ADMIN — CEFEPIME 2 G: 2 INJECTION, POWDER, FOR SOLUTION INTRAVENOUS at 23:17

## 2022-06-30 RX ADMIN — ACETAMINOPHEN 1000 MG: 500 TABLET ORAL at 23:17

## 2022-06-30 ASSESSMENT — ENCOUNTER SYMPTOMS
COUGH: 0
VOMITING: 1
FOCAL WEAKNESS: 0
NAUSEA: 1
ABDOMINAL PAIN: 1
CHILLS: 0
SORE THROAT: 0
SHORTNESS OF BREATH: 0
EYE REDNESS: 0
HEADACHES: 0
BACK PAIN: 1
BLURRED VISION: 0
FEVER: 1
SEIZURES: 0
NECK PAIN: 0

## 2022-06-30 ASSESSMENT — FIBROSIS 4 INDEX: FIB4 SCORE: 1.97

## 2022-07-01 ENCOUNTER — APPOINTMENT (OUTPATIENT)
Dept: RADIOLOGY | Facility: MEDICAL CENTER | Age: 67
DRG: 871 | End: 2022-07-01
Attending: INTERNAL MEDICINE
Payer: MEDICARE

## 2022-07-01 PROBLEM — N18.9 ACUTE-ON-CHRONIC KIDNEY INJURY (HCC): Status: ACTIVE | Noted: 2017-09-27

## 2022-07-01 PROBLEM — N39.0 URINARY TRACT INFECTION: Status: ACTIVE | Noted: 2022-07-01

## 2022-07-01 PROBLEM — A41.9 SEPTIC SHOCK (HCC): Status: ACTIVE | Noted: 2022-07-01

## 2022-07-01 PROBLEM — R65.21 SEPTIC SHOCK (HCC): Status: ACTIVE | Noted: 2022-07-01

## 2022-07-01 LAB
ANION GAP SERPL CALC-SCNC: 8 MMOL/L (ref 7–16)
APPEARANCE UR: ABNORMAL
BACTERIA #/AREA URNS HPF: ABNORMAL /HPF
BILIRUB UR QL STRIP.AUTO: NEGATIVE
BUN SERPL-MCNC: 22 MG/DL (ref 8–22)
CALCIUM SERPL-MCNC: 7.4 MG/DL (ref 8.5–10.5)
CHLORIDE SERPL-SCNC: 103 MMOL/L (ref 96–112)
CO2 SERPL-SCNC: 20 MMOL/L (ref 20–33)
COLOR UR: YELLOW
CREAT SERPL-MCNC: 1.71 MG/DL (ref 0.5–1.4)
ERYTHROCYTE [DISTWIDTH] IN BLOOD BY AUTOMATED COUNT: 48.8 FL (ref 35.9–50)
FLUAV RNA SPEC QL NAA+PROBE: NEGATIVE
FLUBV RNA SPEC QL NAA+PROBE: NEGATIVE
GFR SERPLBLD CREATININE-BSD FMLA CKD-EPI: 44 ML/MIN/1.73 M 2
GLUCOSE BLD STRIP.AUTO-MCNC: 109 MG/DL (ref 65–99)
GLUCOSE BLD STRIP.AUTO-MCNC: 132 MG/DL (ref 65–99)
GLUCOSE BLD STRIP.AUTO-MCNC: 139 MG/DL (ref 65–99)
GLUCOSE BLD STRIP.AUTO-MCNC: 142 MG/DL (ref 65–99)
GLUCOSE SERPL-MCNC: 143 MG/DL (ref 65–99)
GLUCOSE UR STRIP.AUTO-MCNC: NEGATIVE MG/DL
HCT VFR BLD AUTO: 28.1 % (ref 42–52)
HGB BLD-MCNC: 8.7 G/DL (ref 14–18)
HYALINE CASTS #/AREA URNS LPF: ABNORMAL /LPF
INR PPP: 1.65 (ref 0.87–1.13)
KETONES UR STRIP.AUTO-MCNC: NEGATIVE MG/DL
LACTATE SERPL-SCNC: 1 MMOL/L (ref 0.5–2)
LACTATE SERPL-SCNC: 1.1 MMOL/L (ref 0.5–2)
LACTATE SERPL-SCNC: 1.1 MMOL/L (ref 0.5–2)
LEUKOCYTE ESTERASE UR QL STRIP.AUTO: ABNORMAL
MAGNESIUM SERPL-MCNC: 1.1 MG/DL (ref 1.5–2.5)
MCH RBC QN AUTO: 27.4 PG (ref 27–33)
MCHC RBC AUTO-ENTMCNC: 31 G/DL (ref 33.7–35.3)
MCV RBC AUTO: 88.6 FL (ref 81.4–97.8)
MICRO URNS: ABNORMAL
NITRITE UR QL STRIP.AUTO: POSITIVE
NT-PROBNP SERPL IA-MCNC: 2437 PG/ML (ref 0–125)
PH UR STRIP.AUTO: 5 [PH] (ref 5–8)
PHOSPHATE SERPL-MCNC: 2.7 MG/DL (ref 2.5–4.5)
PLATELET # BLD AUTO: 218 K/UL (ref 164–446)
PMV BLD AUTO: 10.4 FL (ref 9–12.9)
POTASSIUM SERPL-SCNC: 5 MMOL/L (ref 3.6–5.5)
PROCALCITONIN SERPL-MCNC: 1.59 NG/ML
PROT UR QL STRIP: 100 MG/DL
PROTHROMBIN TIME: 19.1 SEC (ref 12–14.6)
RBC # BLD AUTO: 3.17 M/UL (ref 4.7–6.1)
RBC # URNS HPF: ABNORMAL /HPF
RBC UR QL AUTO: ABNORMAL
SARS-COV-2 RNA RESP QL NAA+PROBE: NOTDETECTED
SODIUM SERPL-SCNC: 131 MMOL/L (ref 135–145)
SP GR UR STRIP.AUTO: 1.01
SPECIMEN SOURCE: NORMAL
TROPONIN T SERPL-MCNC: 102 NG/L (ref 6–19)
UROBILINOGEN UR STRIP.AUTO-MCNC: 0.2 MG/DL
WBC # BLD AUTO: 19.8 K/UL (ref 4.8–10.8)
WBC #/AREA URNS HPF: ABNORMAL /HPF

## 2022-07-01 PROCEDURE — 85027 COMPLETE CBC AUTOMATED: CPT

## 2022-07-01 PROCEDURE — 700102 HCHG RX REV CODE 250 W/ 637 OVERRIDE(OP): Performed by: NURSE PRACTITIONER

## 2022-07-01 PROCEDURE — 700111 HCHG RX REV CODE 636 W/ 250 OVERRIDE (IP): Performed by: EMERGENCY MEDICINE

## 2022-07-01 PROCEDURE — 0240U HCHG SARS-COV-2 COVID-19 NFCT DS RESP RNA 3 TRGT MIC: CPT

## 2022-07-01 PROCEDURE — C1751 CATH, INF, PER/CENT/MIDLINE: HCPCS

## 2022-07-01 PROCEDURE — A9270 NON-COVERED ITEM OR SERVICE: HCPCS | Performed by: STUDENT IN AN ORGANIZED HEALTH CARE EDUCATION/TRAINING PROGRAM

## 2022-07-01 PROCEDURE — 85610 PROTHROMBIN TIME: CPT

## 2022-07-01 PROCEDURE — 36556 INSERT NON-TUNNEL CV CATH: CPT | Mod: RT,GC | Performed by: INTERNAL MEDICINE

## 2022-07-01 PROCEDURE — 82962 GLUCOSE BLOOD TEST: CPT | Mod: 91

## 2022-07-01 PROCEDURE — 99291 CRITICAL CARE FIRST HOUR: CPT | Mod: 25,GC | Performed by: INTERNAL MEDICINE

## 2022-07-01 PROCEDURE — 36556 INSERT NON-TUNNEL CV CATH: CPT

## 2022-07-01 PROCEDURE — 700101 HCHG RX REV CODE 250: Performed by: EMERGENCY MEDICINE

## 2022-07-01 PROCEDURE — 87077 CULTURE AEROBIC IDENTIFY: CPT

## 2022-07-01 PROCEDURE — 87186 SC STD MICRODIL/AGAR DIL: CPT

## 2022-07-01 PROCEDURE — 81001 URINALYSIS AUTO W/SCOPE: CPT

## 2022-07-01 PROCEDURE — 770022 HCHG ROOM/CARE - ICU (200)

## 2022-07-01 PROCEDURE — 99292 CRITICAL CARE ADDL 30 MIN: CPT | Performed by: EMERGENCY MEDICINE

## 2022-07-01 PROCEDURE — 80048 BASIC METABOLIC PNL TOTAL CA: CPT

## 2022-07-01 PROCEDURE — A9270 NON-COVERED ITEM OR SERVICE: HCPCS | Performed by: NURSE PRACTITIONER

## 2022-07-01 PROCEDURE — 700102 HCHG RX REV CODE 250 W/ 637 OVERRIDE(OP): Performed by: STUDENT IN AN ORGANIZED HEALTH CARE EDUCATION/TRAINING PROGRAM

## 2022-07-01 PROCEDURE — 700105 HCHG RX REV CODE 258: Performed by: STUDENT IN AN ORGANIZED HEALTH CARE EDUCATION/TRAINING PROGRAM

## 2022-07-01 PROCEDURE — 700111 HCHG RX REV CODE 636 W/ 250 OVERRIDE (IP): Performed by: STUDENT IN AN ORGANIZED HEALTH CARE EDUCATION/TRAINING PROGRAM

## 2022-07-01 PROCEDURE — 96365 THER/PROPH/DIAG IV INF INIT: CPT

## 2022-07-01 PROCEDURE — 700105 HCHG RX REV CODE 258: Performed by: EMERGENCY MEDICINE

## 2022-07-01 PROCEDURE — 87086 URINE CULTURE/COLONY COUNT: CPT

## 2022-07-01 PROCEDURE — 84145 PROCALCITONIN (PCT): CPT

## 2022-07-01 PROCEDURE — 83605 ASSAY OF LACTIC ACID: CPT

## 2022-07-01 PROCEDURE — 83735 ASSAY OF MAGNESIUM: CPT

## 2022-07-01 PROCEDURE — 84100 ASSAY OF PHOSPHORUS: CPT

## 2022-07-01 PROCEDURE — 36415 COLL VENOUS BLD VENIPUNCTURE: CPT

## 2022-07-01 PROCEDURE — 02HV33Z INSERTION OF INFUSION DEVICE INTO SUPERIOR VENA CAVA, PERCUTANEOUS APPROACH: ICD-10-PCS | Performed by: INTERNAL MEDICINE

## 2022-07-01 RX ORDER — POLYETHYLENE GLYCOL 3350 17 G/17G
1 POWDER, FOR SOLUTION ORAL
Status: DISCONTINUED | OUTPATIENT
Start: 2022-07-01 | End: 2022-07-06 | Stop reason: HOSPADM

## 2022-07-01 RX ORDER — AMOXICILLIN 250 MG
2 CAPSULE ORAL 2 TIMES DAILY
Status: DISCONTINUED | OUTPATIENT
Start: 2022-07-01 | End: 2022-07-06 | Stop reason: HOSPADM

## 2022-07-01 RX ORDER — DEXTROSE MONOHYDRATE 25 G/50ML
25 INJECTION, SOLUTION INTRAVENOUS
Status: DISCONTINUED | OUTPATIENT
Start: 2022-07-01 | End: 2022-07-04

## 2022-07-01 RX ORDER — ATORVASTATIN CALCIUM 80 MG/1
80 TABLET, FILM COATED ORAL
Status: DISCONTINUED | OUTPATIENT
Start: 2022-07-01 | End: 2022-07-04

## 2022-07-01 RX ORDER — ACETAMINOPHEN 325 MG/1
650 TABLET ORAL EVERY 6 HOURS PRN
Status: DISCONTINUED | OUTPATIENT
Start: 2022-07-01 | End: 2022-07-06 | Stop reason: HOSPADM

## 2022-07-01 RX ORDER — MIDODRINE HYDROCHLORIDE 5 MG/1
5 TABLET ORAL 2 TIMES DAILY
Status: DISCONTINUED | OUTPATIENT
Start: 2022-07-01 | End: 2022-07-04

## 2022-07-01 RX ORDER — MYCOPHENOLATE MOFETIL 250 MG/1
500 CAPSULE ORAL 2 TIMES DAILY
Status: DISCONTINUED | OUTPATIENT
Start: 2022-07-01 | End: 2022-07-06 | Stop reason: HOSPADM

## 2022-07-01 RX ORDER — SODIUM HYPOCHLORITE 1.25 MG/ML
SOLUTION TOPICAL 2 TIMES DAILY
Status: DISCONTINUED | OUTPATIENT
Start: 2022-07-01 | End: 2022-07-03

## 2022-07-01 RX ORDER — PREDNISONE 10 MG/1
5 TABLET ORAL DAILY
Status: DISCONTINUED | OUTPATIENT
Start: 2022-07-01 | End: 2022-07-06 | Stop reason: HOSPADM

## 2022-07-01 RX ORDER — TACROLIMUS 1 MG/1
1 CAPSULE ORAL 2 TIMES DAILY
Status: DISCONTINUED | OUTPATIENT
Start: 2022-07-01 | End: 2022-07-06 | Stop reason: HOSPADM

## 2022-07-01 RX ORDER — SODIUM CHLORIDE, SODIUM LACTATE, POTASSIUM CHLORIDE, AND CALCIUM CHLORIDE .6; .31; .03; .02 G/100ML; G/100ML; G/100ML; G/100ML
1000 INJECTION, SOLUTION INTRAVENOUS ONCE
Status: COMPLETED | OUTPATIENT
Start: 2022-07-01 | End: 2022-07-01

## 2022-07-01 RX ORDER — SODIUM CHLORIDE, SODIUM LACTATE, POTASSIUM CHLORIDE, AND CALCIUM CHLORIDE .6; .31; .03; .02 G/100ML; G/100ML; G/100ML; G/100ML
30 INJECTION, SOLUTION INTRAVENOUS ONCE
Status: COMPLETED | OUTPATIENT
Start: 2022-07-01 | End: 2022-07-01

## 2022-07-01 RX ORDER — BISACODYL 10 MG
10 SUPPOSITORY, RECTAL RECTAL
Status: DISCONTINUED | OUTPATIENT
Start: 2022-07-01 | End: 2022-07-06 | Stop reason: HOSPADM

## 2022-07-01 RX ORDER — NOREPINEPHRINE BITARTRATE 0.03 MG/ML
0-30 INJECTION, SOLUTION INTRAVENOUS CONTINUOUS
Status: DISCONTINUED | OUTPATIENT
Start: 2022-07-01 | End: 2022-07-05

## 2022-07-01 RX ADMIN — NOREPINEPHRINE BITARTRATE 10 MCG/MIN: 1 INJECTION, SOLUTION, CONCENTRATE INTRAVENOUS at 01:57

## 2022-07-01 RX ADMIN — APIXABAN 5 MG: 5 TABLET, FILM COATED ORAL at 20:01

## 2022-07-01 RX ADMIN — CEFEPIME 2 G: 2 INJECTION, POWDER, FOR SOLUTION INTRAVENOUS at 23:56

## 2022-07-01 RX ADMIN — ATORVASTATIN CALCIUM 80 MG: 80 TABLET, FILM COATED ORAL at 20:03

## 2022-07-01 RX ADMIN — GUAIFENESIN 100 MG: 100 SOLUTION ORAL at 22:07

## 2022-07-01 RX ADMIN — SODIUM CHLORIDE, POTASSIUM CHLORIDE, SODIUM LACTATE AND CALCIUM CHLORIDE 2673 ML: 600; 310; 30; 20 INJECTION, SOLUTION INTRAVENOUS at 04:04

## 2022-07-01 RX ADMIN — MIDODRINE HYDROCHLORIDE 5 MG: 5 TABLET ORAL at 20:01

## 2022-07-01 RX ADMIN — TACROLIMUS 1 MG: 1 CAPSULE ORAL at 20:02

## 2022-07-01 RX ADMIN — APIXABAN 5 MG: 5 TABLET, FILM COATED ORAL at 06:12

## 2022-07-01 RX ADMIN — CEFEPIME 2 G: 2 INJECTION, POWDER, FOR SOLUTION INTRAVENOUS at 11:46

## 2022-07-01 RX ADMIN — MIDODRINE HYDROCHLORIDE 5 MG: 5 TABLET ORAL at 06:12

## 2022-07-01 RX ADMIN — SODIUM CHLORIDE, POTASSIUM CHLORIDE, SODIUM LACTATE AND CALCIUM CHLORIDE 1000 ML: 600; 310; 30; 20 INJECTION, SOLUTION INTRAVENOUS at 12:33

## 2022-07-01 RX ADMIN — SODIUM CHLORIDE, POTASSIUM CHLORIDE, SODIUM LACTATE AND CALCIUM CHLORIDE 1000 ML: 600; 310; 30; 20 INJECTION, SOLUTION INTRAVENOUS at 01:54

## 2022-07-01 RX ADMIN — PREDNISONE 5 MG: 10 TABLET ORAL at 20:01

## 2022-07-01 RX ADMIN — MYCOPHENOLATE MOFETIL 500 MG: 250 CAPSULE ORAL at 20:00

## 2022-07-01 RX ADMIN — SENNOSIDES AND DOCUSATE SODIUM 2 TABLET: 50; 8.6 TABLET ORAL at 06:17

## 2022-07-01 ASSESSMENT — ENCOUNTER SYMPTOMS
PALPITATIONS: 0
MYALGIAS: 1
NAUSEA: 1
TINGLING: 0
SPUTUM PRODUCTION: 0
DOUBLE VISION: 0
ABDOMINAL PAIN: 0
SHORTNESS OF BREATH: 0
BLURRED VISION: 0
CHILLS: 1
COUGH: 0
NERVOUS/ANXIOUS: 0
BACK PAIN: 1
FEVER: 1
VOMITING: 1
HALLUCINATIONS: 0
TREMORS: 0
SORE THROAT: 0

## 2022-07-01 ASSESSMENT — PAIN DESCRIPTION - PAIN TYPE
TYPE: ACUTE PAIN

## 2022-07-01 ASSESSMENT — FIBROSIS 4 INDEX
FIB4 SCORE: 1.04
FIB4 SCORE: .9210526315789473684

## 2022-07-01 ASSESSMENT — CHA2DS2 SCORE
DIABETES: YES
PRIOR STROKE OR TIA OR THROMBOEMBOLISM: NO
HYPERTENSION: YES
VASCULAR DISEASE: YES
CHA2DS2 VASC SCORE: 4
SEX: MALE
CHF OR LEFT VENTRICULAR DYSFUNCTION: NO
AGE 65 TO 74: YES
AGE 75 OR GREATER: NO

## 2022-07-01 NOTE — PROGRESS NOTES
4 Eyes Skin Assessment Completed by LAN Ramirez and LAN Muro.    Head WDL  Ears WDL  Nose WDL  Mouth Redness  Neck WDL  Breast/Chest WDL  Shoulder Blades WDL  Spine WDL  (R) Arm/Elbow/Hand WDL  (L) Arm/Elbow/Hand WDL  Abdomen WDL  Groin WDL  Scrotum/Coccyx/Buttocks WDL  (R) Leg Swelling and Edema  (L) Leg Swelling and Edema  (R) Heel/Foot/Toe Redness, Swelling and Edema  (L) Heel/Foot/Toe Edema          Devices In Places ECG, Blood Pressure Cuff, Pulse Ox and Nasal Cannula      Interventions In Place Pillows and Pressure Redistribution Mattress    Possible Skin Injury Yes    Pictures Uploaded Into Epic Yes  Wound Consult Placed Yes  RN Wound Prevention Protocol Ordered Yes  2

## 2022-07-01 NOTE — PROCEDURES
"Central Venous Catheter Insertion    Date: 7/1/2022    Preoperative diagnosis: Septic shock    Postoperative diagnosis: Septic shock      Resident: Dr. Tran  Attending Physician supervising: Dr. Gonda    Anesthesia:local lidocaine    Procedure: Following informed consent, the patient was properly identified and optimally positioned in bed. Maximal barrier precautions were employed. A\" time out\" was initiated. The correct patient, procedure, and operative site was verified. The patient's allergy status was assessed. Procedural modifications were made as required.    The right neck was prepped with chlorhexidine and draped into a sterile field  The patient was placed in a shallow Trendelenburg position. The internal jugular vein was accessed percutaneously and a wire advanced without resistance. A previously flushed triple lumen catheter was passed using sterile Selldinger technique and secured to the skin with silk sutures. All of the ports flushed and aspirated freely. The site was cleaned. An antibacterial disk was placed about the catheter exit site and dressed with a transparent sterile occlusive dressing.    The patient tolerated the procedure well. There were no apparent immediate complications. A stat portable chest radiograph was ordered and the line was verified in the appropriate position.    "

## 2022-07-01 NOTE — ASSESSMENT & PLAN NOTE
Paroxysmal atrial fibrillation  Continue apixaban   Full range of motion of upper and lower extremities, no joint tenderness/swelling.

## 2022-07-01 NOTE — ED NOTES
Med rec partial per MAR sent with patient from [facility] and [facility].  MAR sent with patient is a photocopy and right edges of pages are cut off. Unable to verify administrations for June 30. Contacted facility ([phone]) at 23:35 to request new copy of MAR via fax.  Allergies reviewed per transfer paperwork.  No antibiotic usage noted on MAR.

## 2022-07-01 NOTE — CARE PLAN
The patient is Watcher - Medium risk of patient condition declining or worsening    Shift Goals  Clinical Goals: Hemodynamic stability  Patient Goals: Rest  Family Goals: Rest    Progress made toward(s) clinical / shift goals:    Problem: Knowledge Deficit - Standard  Goal: Patient and family/care givers will demonstrate understanding of plan of care, disease process/condition, diagnostic tests and medications  Outcome: Progressing     Problem: Hemodynamics  Goal: Patient's hemodynamics, fluid balance and neurologic status will be stable or improve  Outcome: Progressing     Problem: Respiratory  Goal: Patient will achieve/maintain optimum respiratory ventilation and gas exchange  Outcome: Progressing     Problem: Pain - Standard  Goal: Alleviation of pain or a reduction in pain to the patient’s comfort goal  Outcome: Progressing

## 2022-07-01 NOTE — ASSESSMENT & PLAN NOTE
This is Sepsis Present on admission  SIRS criteria identified on my evaluation include: Fever, with temperature greater than 101 deg F, Tachycardia, with heart rate greater than 90 BPM and Leukocytosis, with WBC greater than 12,000  Source is urinary  Sepsis protocol initiated  Fluid resuscitation ordered per protocol  Crystalloid Fluid Administration: Fluid resuscitation ordered per standard protocol - 30 mL/kg per current or ideal body weight  IV antibiotics as appropriate for source of sepsis  Reassessment: I have reassessed the patient's hemodynamic status

## 2022-07-01 NOTE — HOSPITAL COURSE
"Chief Complaint  Urosepsis    \"67 yo male with a PMHx of diabetes, kidney failure status post renal transplant, hypertension, atrial fibrillation on Eliquis who presents with 2 days of nausea, vomiting, dysuria, frequency, hesitancy from [facility]..  He was admitted to [facility] from 5/24 through 6/22 for right lower extremity cellulitis, septic shock, complicated by ventilator dependent respiratory failure and PEA arrest.  In the ED, patient was found to be in septic shock from urinary source and was given 1500 mL of fluid in addition to 300 mL provided by EMS.  He was also put on cefepime given history of E. coli bacteremia.  He remained hypotensive despite fluid resuscitation and was initiated on a norepinephrine drip.  Patient denies abdominal pain but does have lower back pain.  His right lower extremity is bandaged from this morning but his son brings in pictures which show improvement in the ulcerations.\"    Hospital Course  7/1 - intermittent norepinephrine requirement, LR bolus  "

## 2022-07-01 NOTE — ED PROVIDER NOTES
ED Provider Note    CHIEF COMPLAINT  Chief Complaint   Patient presents with   • N/V     BIBA from [facility] for n/v x 2 days and UTI symptoms x2 days, states having burning,frequency,cloudy color  RUQ and RLQ abdominal pain  Was recently discharged 1 week ago after admission for urosepsis  4 zofran PTA and 300ml   • Painful Urination       HPI  Jama Altman is a 66 y.o. male with history of diabetes, kidney transplant on immunosuppression, recent prolonged admission for sepsis due to cellulitis who presents to the emergency department with concern for urinary tract infection.  Patient was just discharged from the hospital about a week ago following a prolonged admission for sepsis, E. coli bacteremia, cardiac arrest.  He states that he feels that his infection was not fully treated.  He started having symptoms of painful urination as well as nausea and vomiting 2 days ago.  He is also had increasing frequency and a cloudy color to his urine as well as some mild right-sided abdominal pain.  He reports back pain that has been ongoing for the last 4 weeks he does state he had a fever as high as 103 today.  He does report a cough however has had multiple recent negative COVID test.    REVIEW OF SYSTEMS  See HPI for further details.   Review of Systems   Constitutional: Positive for fever. Negative for chills.   HENT: Negative for sore throat.    Eyes: Negative for blurred vision and redness.   Respiratory: Negative for cough and shortness of breath.    Cardiovascular: Negative for chest pain and leg swelling.   Gastrointestinal: Positive for abdominal pain, nausea and vomiting.   Genitourinary: Positive for dysuria and urgency.   Musculoskeletal: Positive for back pain. Negative for neck pain.   Skin: Negative for rash.   Neurological: Negative for focal weakness, seizures and headaches.   Psychiatric/Behavioral: Negative for suicidal ideas.         PAST MEDICAL HISTORY   has a past medical history of Benign  "essential hypertension, Hyperlipoproteinemia, Hypertension, Pain, Polycystic kidney (9/10/10), Sleep apnea, and Snoring.    SOCIAL HISTORY  Social History     Tobacco Use   • Smoking status: Never Smoker   • Smokeless tobacco: Never Used   Vaping Use   • Vaping Use: Never used   Substance and Sexual Activity   • Alcohol use: No   • Drug use: No   • Sexual activity: Yes     Partners: Female       SURGICAL HISTORY   has a past surgical history that includes knee arthroplasty total (1/12/07); knee arthroscopy (4/10/06); other orthopedic surgery (7/8/74); other (9/10/10); knee arthroscopy (5/3/2011); meniscectomy, knee, medial (5/3/2011); knee unicompartmental (12/23/2011); knee arthroscopy (12/23/2011); and knee manipulation (2/16/2012).    CURRENT MEDICATIONS  Home Medications     Reviewed by Emir Soria (Pharmacy Tech) on 06/30/22 at 2337  Med List Status: Not Addressed   Medication Last Dose Status   apixaban (ELIQUIS) 5mg Tab  Active   atorvastatin (LIPITOR) 80 MG tablet  Active   glyBURIDE (DIABETA) 5 MG Tab  Active   midodrine (PROAMATINE) 5 MG Tab  Active   mycophenolate (CELLCEPT) 250 MG Cap  Active   predniSONE (DELTASONE) 5 MG Tab  Active   SITagliptin (JANUVIA) 50 MG Tab  Active   Sodium Hypochlorite (DAKINS 0.125%, 1/4 STRENGTH,) 0.125 % Solution  Active   tacrolimus (PROGRAF) 1 MG Cap  Active                ALLERGIES  Allergies   Allergen Reactions   • Doxycycline Rash     Sweats and shakes: 9/28/17: Clarified allergy with patient. Allergy was in 1998 and he doesn't remember what happened. He thought the medication is for pain.  Tolerates doxycycline 9/2017       PHYSICAL EXAM   VITAL SIGNS: /70   Pulse 93   Temp 36.4 °C (97.5 °F) (Temporal)   Resp 17   Ht 1.956 m (6' 5\")   Wt 91.5 kg (201 lb 11.5 oz)   SpO2 99%   BMI 23.92 kg/m²      Physical Exam  Constitutional:       General: He is not in acute distress.     Comments: Chronically ill-appearing older male   HENT:      Head: " Normocephalic and atraumatic.   Eyes:      Conjunctiva/sclera: Conjunctivae normal.      Pupils: Pupils are equal, round, and reactive to light.   Cardiovascular:      Rate and Rhythm: Regular rhythm. Tachycardia present.      Heart sounds: Normal heart sounds.   Pulmonary:      Effort: Pulmonary effort is normal. No respiratory distress.      Breath sounds: Normal breath sounds.   Abdominal:      General: There is no distension.      Palpations: Abdomen is soft.      Tenderness: There is abdominal tenderness.      Comments: Mild right-sided abdominal tenderness   Musculoskeletal:         General: No tenderness. Normal range of motion.      Cervical back: Normal range of motion and neck supple.   Skin:     General: Skin is warm and dry.      Comments: RLE with bandagein place.  Able to review images of leg from earlier today prior to wound care showing multiple open wounds with granulation tissue in place.  No surrounding erythema or obvious swelling.   Neurological:      Mental Status: He is alert and oriented to person, place, and time.      Comments: Moving all extremities spontaneously   Psychiatric:         Mood and Affect: Affect normal.           DIAGNOSTIC STUDIES    EKG  Results for orders placed or performed during the hospital encounter of 22   EKG   Result Value Ref Range    Report       [facility] Emergency Dept.    Test Date:  2022  Pt Name:    CIARA FORRESTER                   Department: ER  MRN:        [id number]                      Room:       [facility]  Gender:     Male                         Technician: [id number]  :        1955                   Requested By:ELISA ROJO  Order #:    [id number]                    Reading MD: Elisa Rojo MD    Measurements  Intervals                                Axis  Rate:       130                          P:          266  MD:         148                          QRS:        102  QRSD:       144                          T:           10  QT:         338  QTc:        497    Interpretive Statements  Sinus tachycardia with irregular rate  RBBB and LPFB  No acute ST or T wave change  Compared to ECG 06/21/2022 09:43:30  Ventricular premature complex(es) no longer present  No other significant change  Electronically Signed On 6- 23:23:01 PDT by Elisa Irvin MD             LABS  Personally reviewed by me  Labs Reviewed   LACTIC ACID - Abnormal; Notable for the following components:       Result Value    Lactic Acid 2.1 (*)     All other components within normal limits    Narrative:     spun at 2258   CBC WITH DIFFERENTIAL - Abnormal; Notable for the following components:    WBC 14.7 (*)     RBC 3.73 (*)     Hemoglobin 10.1 (*)     Hematocrit 32.8 (*)     MCHC 30.8 (*)     Neutrophils-Polys 88.70 (*)     Lymphocytes 2.90 (*)     Immature Granulocytes 1.70 (*)     Neutrophils (Absolute) 13.07 (*)     Lymphs (Absolute) 0.43 (*)     Monos (Absolute) 0.93 (*)     Immature Granulocytes (abs) 0.25 (*)     All other components within normal limits    Narrative:     spun at 2258   COMP METABOLIC PANEL - Abnormal; Notable for the following components:    Sodium 133 (*)     Glucose 155 (*)     Bun 25 (*)     Creatinine 1.98 (*)     Calcium 7.8 (*)     Alkaline Phosphatase 106 (*)     Albumin 2.9 (*)     Total Protein 5.9 (*)     All other components within normal limits    Narrative:     spun at 2258   URINALYSIS - Abnormal; Notable for the following components:    Character Turbid (*)     Protein 100 (*)     Nitrite Positive (*)     Leukocyte Esterase Large (*)     Occult Blood Moderate (*)     All other components within normal limits    Narrative:     Indication for culture:->Evaluation for sepsis without a  clear source of infection   PROTHROMBIN TIME - Abnormal; Notable for the following components:    PT 18.8 (*)     INR 1.61 (*)     All other components within normal limits    Narrative:     Indicate which anticoagulants the patient is  "on:->ARGATROBAN   APTT - Abnormal; Notable for the following components:    APTT 37.3 (*)     All other components within normal limits    Narrative:     Indicate which anticoagulants the patient is on:->ARGATROBAN   TROPONIN - Abnormal; Notable for the following components:    Troponin T 102 (*)     All other components within normal limits   PROBRAIN NATRIURETIC PEPTIDE, NT - Abnormal; Notable for the following components:    NT-proBNP 2437 (*)     All other components within normal limits   ESTIMATED GFR - Abnormal; Notable for the following components:    GFR (CKD-EPI) 37 (*)     All other components within normal limits    Narrative:     spun at 2258   URINE MICROSCOPIC (W/UA) - Abnormal; Notable for the following components:    WBC 20-50 (*)     RBC  (*)     Bacteria Many (*)     All other components within normal limits    Narrative:     Indication for culture:->Evaluation for sepsis without a  clear source of infection   PROCALCITONIN - Abnormal; Notable for the following components:    Procalcitonin 1.59 (*)     All other components within normal limits    Narrative:     Repeat if initial lactic acid result is greater than 2   PROTHROMBIN TIME - Abnormal; Notable for the following components:    PT 19.1 (*)     INR 1.65 (*)     All other components within normal limits    Narrative:     If not done within the last 4 hours  Indicate which anticoagulants the patient is on:->UNKNOWN   LACTIC ACID    Narrative:     Repeat if initial lactic acid result is greater than 2   COV-2 AND FLU A/B BY PCR ([facility])   LACTIC ACID   LACTIC ACID   URINE CULTURE(NEW)    Narrative:     Indication for culture:->Evaluation for sepsis without a  clear source of infection   BLOOD CULTURE    Narrative:     Per Hospital Policy: Only change Specimen Src: to \"Line\" if  specified by physician order.   BLOOD CULTURE    Narrative:     Per Hospital Policy: Only change Specimen Src: to \"Line\" if  specified by physician order. "   LACTIC ACID           RADIOLOGY  Personally reviewed by me  DX-CHEST-FOR LINE PLACEMENT Perform procedure in: Patient's Room   Final Result         1.  Interstitial edema and/or infiltrate.   2.  Atherosclerosis      CT-RENAL COLIC EVALUATION(A/P W/O)   Final Result         1.  Changes compatible with polycystic kidney disease.   2.  Diverticulosis   3.  Fat-containing left inguinal hernia      DX-CHEST-PORTABLE (1 VIEW)   Final Result         1.  Patchy bilateral pulmonary infiltrates or edema.   2.  Atherosclerosis            ED COURSE  Vitals:    07/01/22 0545 07/01/22 0600 07/01/22 0615 07/01/22 0630   BP: 117/80 111/68 132/60 119/70   Pulse: 95 (!) 105 94 93   Resp: 15 17 20 17   Temp:  36.4 °C (97.5 °F)     TempSrc:  Temporal     SpO2:  99%     Weight:       Height:             Medications administered:  IVF, cefepime    Old records personally reviewed:  Patient recently had a month long admission and was discharged from here 1 week prior.  He had sepsis due to right lower extremity cellulitis and E. coli bacteremia.  He did have a PEA arrest during his hospitalization.  Last echocardiogram was reviewed on 5/23 with a EF of 35%.  He has a history of atrial fibrillation on anticoagulation.  Also on midodrine for blood pressure support.        MEDICAL DECISION MAKING  Patient with history of renal transplant on immunosuppression as well as diabetes who had a recent admission for sepsis due to cellulitis complicated by bacteremia and PEA arrest who is now presenting with dysuria and nausea/vomiting.  He is febrile on arrival with associated tachycardia and hypotension concerning for sepsis.  Patient was given fluid resuscitation and broad-spectrum antibiotics immediately.  His labs show mild leukocytosis as well as only mild elevation of lactate.  He does have mildly worsening kidney function however no other electrolyte abnormalities.  Chest x-ray does not show focal pneumonia although may have some pulmonary  edema.  He does have mild elevation of his troponin and BNP without prior for comparison.  Urinalysis does show evidence of infection and I feel this is the source of infection.  Imaging of the abdomen was performed without evidence of pyelonephritis or kidney stone or other active infection.  Reviewed imaging of right lower extremity which does not seem to show cellulitis or worsening skin infection.    Following approximately 1800 mL of IV fluids, the patient remains hypotensive with systolic blood pressures in the 80s.  His heart rate is slowly improving.  Repeat lactate has completely normalized.  The patient is mentating appropriately and does not have any significant complaints at this time.  We will plan for another 1 L of fluid for his full 30ml/kg sepsis bolus.  Plan to initiate Levophed and place central line with admission to the ICU due to ongoing hypotension.    I discussed the case with Dr. Gonda, intensivist on-call.  They will plan to place central line once upstairs in the ICU as he has a bed angie patient was updated on plan of care for admission and agreeable at this time.dy at this time.      CRITICAL CARE TIME  Upon my evaluation, this patient had a high probability of imminent or life-threatening deterioration due to sepsis due to UTI with persistent hypotension, acute kidney injury which required my direct attention, intervention, and personal management.     I personally provided 37 minutes of total critical care time outside of time spent on separately billable/documented procedures. Time includes: review of laboratory data, review of radiology studies, discussion with consultants, discussion with family/patient, monitoring for potential decompensation.  Interventions were performed as documented above.         IMPRESSION  (A41.9) Sepsis without acute organ dysfunction, due to unspecified organism (HCC)  Urinary tract infection  Acute kidney injury    Disposition: Admit medicine, critical  condition  Results, diagnoses, and treatment options were discussed with the patient and/or family. Patient verbalized understanding of plan of care.    Patient referred to primary care provider for monitoring and treatment of blood pressure.      Current Discharge Medication List              Electronically signed by: Elisa Irvin M.D., 6/30/2022 11:23 PM

## 2022-07-01 NOTE — ASSESSMENT & PLAN NOTE
ESRD 2/2 polycystic kidney disease s/p renal transplant 9/10/2010  Continue mycophenolate, prednisone, and tacrolimus

## 2022-07-01 NOTE — ASSESSMENT & PLAN NOTE
This is Sepsis Present on admission  SIRS criteria identified on my evaluation include: Fever, with temperature greater than 101 deg F, Tachycardia, with heart rate greater than 90 BPM and Leukocytosis, with WBC greater than 12,000  Source is Urinary  Sepsis protocol initiated  Fluid resuscitation ordered per protocol  Vasopressor support for goal MAP>65  Crystalloid Fluid Administration: Fluid resuscitation ordered per standard protocol - 30 mL/kg per current or ideal body weight  IV antibiotics as appropriate for source of sepsis - cefipeme  Reassessment: I have reassessed the patient's hemodynamic status    Titrate norepinephrine to maintain MAP > 65  Fluid resuscitation  Blood cultures - GNR  Trend lactates - cleared  Antimicrobials - cefepime

## 2022-07-01 NOTE — PROGRESS NOTES
"Critical Care Progress Note    Date of admission  6/30/2022    Chief Complaint  Urosepsis    \"67 yo male with a PMHx of diabetes, kidney failure status post renal transplant, hypertension, atrial fibrillation on Eliquis who presents with 2 days of nausea, vomiting, dysuria, frequency, hesitancy from [facility]..  He was admitted to [facility] from 5/24 through 6/22 for right lower extremity cellulitis, septic shock, complicated by ventilator dependent respiratory failure and PEA arrest.  In the ED, patient was found to be in septic shock from urinary source and was given 1500 mL of fluid in addition to 300 mL provided by EMS.  He was also put on cefepime given history of E. coli bacteremia.  He remained hypotensive despite fluid resuscitation and was initiated on a norepinephrine drip.  Patient denies abdominal pain but does have lower back pain.  His right lower extremity is bandaged from this morning but his son brings in pictures which show improvement in the ulcerations.\"    Hospital Course  7/1 - intermittent norepinephrine requirement, LR bolus    Review of Systems  Review of Systems   Constitutional: Positive for chills, fever and malaise/fatigue.   HENT: Negative for sore throat.    Respiratory: Negative for shortness of breath.    Cardiovascular: Negative for chest pain.   Gastrointestinal: Negative for abdominal pain.        Vital Signs for last 24 hours   Temp:  [36.3 °C (97.4 °F)-38.7 °C (101.6 °F)] 37.2 °C (99 °F)  Pulse:  [] 99  Resp:  [15-35] 29  BP: ()/(43-85) 87/57  SpO2:  [89 %-100 %] 96 %    Hemodynamic parameters for last 24 hours       Respiratory Information for the last 24 hours       Physical Exam   Physical Exam  Vitals and nursing note reviewed.   Constitutional:       General: He is not in acute distress.     Appearance: He is ill-appearing. He is not toxic-appearing.   HENT:      Head: Normocephalic and atraumatic.      Right Ear: External ear normal.      Left Ear: External ear " normal.      Nose: Nose normal.      Mouth/Throat:      Mouth: Mucous membranes are moist.      Pharynx: Oropharynx is clear.   Eyes:      Extraocular Movements: Extraocular movements intact.      Pupils: Pupils are equal, round, and reactive to light.   Cardiovascular:      Rate and Rhythm: Tachycardia present. Rhythm irregular.      Pulses: Normal pulses.      Heart sounds: Normal heart sounds.   Pulmonary:      Effort: Pulmonary effort is normal.      Breath sounds: Normal breath sounds.   Abdominal:      Palpations: Abdomen is soft.   Musculoskeletal:         General: Normal range of motion.      Cervical back: Normal range of motion and neck supple.   Skin:     General: Skin is warm and dry.      Capillary Refill: Capillary refill takes less than 2 seconds.   Neurological:      General: No focal deficit present.   Psychiatric:         Mood and Affect: Mood normal.         Medications  Current Facility-Administered Medications   Medication Dose Route Frequency Provider Last Rate Last Admin   • norepinephrine (Levophed) 8 mg in 250 mL NS infusion (premix)  0-30 mcg/min Intravenous Continuous Elisa Irvin M.D. 7.5 mL/hr at 07/01/22 1430 4 mcg/min at 07/01/22 1430   • senna-docusate (PERICOLACE or SENOKOT S) 8.6-50 MG per tablet 2 Tablet  2 Tablet Oral BID Pepe Tran M.D.   2 Tablet at 07/01/22 0617    And   • polyethylene glycol/lytes (MIRALAX) PACKET 1 Packet  1 Packet Oral QDAY PRN Pepe Tran M.D.        And   • magnesium hydroxide (MILK OF MAGNESIA) suspension 30 mL  30 mL Oral QDAY PRN Pepe Tran M.D.        And   • bisacodyl (DULCOLAX) suppository 10 mg  10 mg Rectal QDAY PRN Pepe Tran M.D.       • Respiratory Therapy Consult   Nebulization Continuous RT Pepe Tran M.D.       • acetaminophen (Tylenol) tablet 650 mg  650 mg Oral Q6HRS PRN Pepe Tran M.D.       • apixaban (ELIQUIS) tablet 5 mg  5 mg Oral BID Pepe Tran M.D.   5 mg at 07/01/22 0612   • atorvastatin (LIPITOR) tablet 80 mg  80  mg Oral QHS Pepe Tran M.D.       • cefepime (Maxipime) 2 g in dextrose 5% 20 mL IV syringe  2 g Intravenous Q12HR Pepe Tran M.D.   Stopped at 07/01/22 1151   • MD Alert...ICU Electrolyte Replacement per Pharmacy   Other PHARMACY TO DOSE Pepe Tran M.D.       • insulin regular (HumuLIN R,NovoLIN R) injection  1-6 Units Subcutaneous 4X/DAY ACHS Pepe Tran M.D.        And   • dextrose 50% (D50W) injection 25 g  25 g Intravenous Q15 MIN PRN Pepe Tran M.D.       • midodrine (PROAMATINE) tablet 5 mg  5 mg Oral BID Pepe Tran M.D.   5 mg at 07/01/22 0612   • dakins 0.125% (1/4 strength) topical soln   Topical BID Jeremy M Gonda, M.D.       • mycophenolate (CELLCEPT) capsule 500 mg  500 mg Oral BID Baljit Martínez M.D.       • predniSONE (DELTASONE) tablet 5 mg  5 mg Oral DAILY Baljit Martínez M.D.       • tacrolimus (PROGRAF) capsule 1 mg  1 mg Oral BID Baljit Martínez M.D.           Fluids    Intake/Output Summary (Last 24 hours) at 7/1/2022 1608  Last data filed at 7/1/2022 1400  Gross per 24 hour   Intake 6469.9 ml   Output 1335 ml   Net 5134.9 ml       Laboratory          Recent Labs     06/30/22 2245 07/01/22  1242   SODIUM 133* 131*   POTASSIUM 5.0 5.0   CHLORIDE 101 103   CO2 21 20   BUN 25* 22   CREATININE 1.98* 1.71*   MAGNESIUM  --  1.1*   PHOSPHORUS  --  2.7   CALCIUM 7.8* 7.4*     Recent Labs     06/30/22 2245 07/01/22  1242   ALTSGPT 16  --    ASTSGOT 14  --    ALKPHOSPHAT 106*  --    TBILIRUBIN 0.4  --    GLUCOSE 155* 143*     Recent Labs     06/30/22 2245 07/01/22  1242   WBC 14.7* 19.8*   NEUTSPOLYS 88.70*  --    LYMPHOCYTES 2.90*  --    MONOCYTES 6.30  --    EOSINOPHILS 0.10  --    BASOPHILS 0.30  --    ASTSGOT 14  --    ALTSGPT 16  --    ALKPHOSPHAT 106*  --    TBILIRUBIN 0.4  --      Recent Labs     06/30/22  2245 07/01/22  0512 07/01/22  1242   RBC 3.73*  --  3.17*   HEMOGLOBIN 10.1*  --  8.7*   HEMATOCRIT 32.8*  --  28.1*   PLATELETCT 247  --  218   PROTHROMBTM 18.8* 19.1*   --    APTT 37.3*  --   --    INR 1.61* 1.65*  --        Imaging  X-Ray:  I have personally reviewed the images and compared with prior images.  EKG:  I have personally reviewed the images and compared with prior images.  CT:    Reviewed    Assessment/Plan  * Septic shock (HCC)- (present on admission)  Assessment & Plan  This is Sepsis Present on admission  SIRS criteria identified on my evaluation include: Fever, with temperature greater than 101 deg F, Tachycardia, with heart rate greater than 90 BPM and Leukocytosis, with WBC greater than 12,000  Source is Urinary  Sepsis protocol initiated  Fluid resuscitation ordered per protocol  Vasopressor support for goal MAP>65  Crystalloid Fluid Administration: Fluid resuscitation ordered per standard protocol - 30 mL/kg per current or ideal body weight  IV antibiotics as appropriate for source of sepsis - cefipeme  Reassessment: I have reassessed the patient's hemodynamic status    Titrate norepinephrine to maintain MAP > 65  Fluid resuscitation  Blood cultures - GNR  Trend lactates - cleared  Antimicrobials - cefepime    Urinary tract infection- (present on admission)  Assessment & Plan  History of Ecoli bactremia 5/22  Continue cefipeme  Blood cx: GNR  Urine cx: NGTD    PAF (paroxysmal atrial fibrillation) ([facility])- (present on admission)  Assessment & Plan  Paroxysmal atrial fibrillation  Continue apixaban    Acute-on-chronic kidney injury (HCC)- (present on admission)  Assessment & Plan  Baseline CKD stage 3b  Likely secondary to poor oral intake  Improving    Type 2 diabetes mellitus with kidney complication, without long-term current use of insulin (HCC)- (present on admission)  Assessment & Plan  Sliding scale insulin    History of renal transplant- (present on admission)  Assessment & Plan  ESRD 2/2 polycystic kidney disease s/p renal transplant 9/10/2010  Continue mycophenolate, prednisone, and tacrolimus     DVT prophylaxis: Apixaban  PUD prophylaxis: Not  indicated  Glycemic control: Sliding scale insulin  Nutrition: Diet  Lines: Needed  Epps: Need for strict I/O monitoring, remove when able  Mobility: Early mobility as tolerated  Goals of care: Full code  Disposition: ICU    I have performed a physical exam and reviewed and updated ROS and Plan today (7/1/2022). In review of yesterday's note (6/30/2022), there are no changes except as documented above.     Discussed patient condition and risk of morbidity and/or mortality with RN, RT, Pharmacy, , Code status disscussed, Charge nurse / hot rounds and Patient     The patient remains critically ill.  Critical care time = 65 minutes in directly providing and coordinating critical care and extensive data review.  No time overlap and excludes procedures.

## 2022-07-02 ENCOUNTER — APPOINTMENT (OUTPATIENT)
Dept: CARDIOLOGY | Facility: MEDICAL CENTER | Age: 67
DRG: 871 | End: 2022-07-02
Attending: INTERNAL MEDICINE
Payer: MEDICARE

## 2022-07-02 PROBLEM — R78.81 GRAM-NEGATIVE BACTEREMIA: Status: RESOLVED | Noted: 2022-07-02 | Resolved: 2022-07-02

## 2022-07-02 PROBLEM — R78.81 GRAM-NEGATIVE BACTEREMIA: Status: ACTIVE | Noted: 2022-07-02

## 2022-07-02 LAB
ANION GAP SERPL CALC-SCNC: 9 MMOL/L (ref 7–16)
BUN SERPL-MCNC: 23 MG/DL (ref 8–22)
CALCIUM SERPL-MCNC: 7.8 MG/DL (ref 8.5–10.5)
CHLORIDE SERPL-SCNC: 100 MMOL/L (ref 96–112)
CO2 SERPL-SCNC: 21 MMOL/L (ref 20–33)
CREAT SERPL-MCNC: 1.57 MG/DL (ref 0.5–1.4)
ERYTHROCYTE [DISTWIDTH] IN BLOOD BY AUTOMATED COUNT: 49.5 FL (ref 35.9–50)
GFR SERPLBLD CREATININE-BSD FMLA CKD-EPI: 48 ML/MIN/1.73 M 2
GLUCOSE BLD STRIP.AUTO-MCNC: 149 MG/DL (ref 65–99)
GLUCOSE BLD STRIP.AUTO-MCNC: 193 MG/DL (ref 65–99)
GLUCOSE BLD STRIP.AUTO-MCNC: 206 MG/DL (ref 65–99)
GLUCOSE BLD STRIP.AUTO-MCNC: 225 MG/DL (ref 65–99)
GLUCOSE SERPL-MCNC: 156 MG/DL (ref 65–99)
HCT VFR BLD AUTO: 26.5 % (ref 42–52)
HGB BLD-MCNC: 8.1 G/DL (ref 14–18)
LV EJECT FRACT  99904: 45
LV EJECT FRACT MOD 2C 99903: 33.23
LV EJECT FRACT MOD 4C 99902: 49.67
LV EJECT FRACT MOD BP 99901: 36.37
MAGNESIUM SERPL-MCNC: 1.3 MG/DL (ref 1.5–2.5)
MCH RBC QN AUTO: 26.9 PG (ref 27–33)
MCHC RBC AUTO-ENTMCNC: 30.6 G/DL (ref 33.7–35.3)
MCV RBC AUTO: 88 FL (ref 81.4–97.8)
PHOSPHATE SERPL-MCNC: 3.8 MG/DL (ref 2.5–4.5)
PLATELET # BLD AUTO: 176 K/UL (ref 164–446)
PMV BLD AUTO: 10.8 FL (ref 9–12.9)
POTASSIUM SERPL-SCNC: 5.1 MMOL/L (ref 3.6–5.5)
RBC # BLD AUTO: 3.01 M/UL (ref 4.7–6.1)
SODIUM SERPL-SCNC: 130 MMOL/L (ref 135–145)
WBC # BLD AUTO: 11.2 K/UL (ref 4.8–10.8)

## 2022-07-02 PROCEDURE — 700111 HCHG RX REV CODE 636 W/ 250 OVERRIDE (IP): Performed by: INTERNAL MEDICINE

## 2022-07-02 PROCEDURE — 700101 HCHG RX REV CODE 250: Performed by: INTERNAL MEDICINE

## 2022-07-02 PROCEDURE — 700102 HCHG RX REV CODE 250 W/ 637 OVERRIDE(OP): Performed by: STUDENT IN AN ORGANIZED HEALTH CARE EDUCATION/TRAINING PROGRAM

## 2022-07-02 PROCEDURE — 83735 ASSAY OF MAGNESIUM: CPT

## 2022-07-02 PROCEDURE — 93306 TTE W/DOPPLER COMPLETE: CPT

## 2022-07-02 PROCEDURE — 700111 HCHG RX REV CODE 636 W/ 250 OVERRIDE (IP): Performed by: EMERGENCY MEDICINE

## 2022-07-02 PROCEDURE — 82962 GLUCOSE BLOOD TEST: CPT

## 2022-07-02 PROCEDURE — 770000 HCHG ROOM/CARE - INTERMEDIATE ICU *

## 2022-07-02 PROCEDURE — 84100 ASSAY OF PHOSPHORUS: CPT

## 2022-07-02 PROCEDURE — 80048 BASIC METABOLIC PNL TOTAL CA: CPT

## 2022-07-02 PROCEDURE — 93306 TTE W/DOPPLER COMPLETE: CPT | Mod: 26 | Performed by: INTERNAL MEDICINE

## 2022-07-02 PROCEDURE — 700117 HCHG RX CONTRAST REV CODE 255: Performed by: INTERNAL MEDICINE

## 2022-07-02 PROCEDURE — 99223 1ST HOSP IP/OBS HIGH 75: CPT | Performed by: HOSPITALIST

## 2022-07-02 PROCEDURE — A9270 NON-COVERED ITEM OR SERVICE: HCPCS | Performed by: STUDENT IN AN ORGANIZED HEALTH CARE EDUCATION/TRAINING PROGRAM

## 2022-07-02 PROCEDURE — 85027 COMPLETE CBC AUTOMATED: CPT

## 2022-07-02 PROCEDURE — 80197 ASSAY OF TACROLIMUS: CPT

## 2022-07-02 PROCEDURE — 700105 HCHG RX REV CODE 258: Performed by: INTERNAL MEDICINE

## 2022-07-02 PROCEDURE — 99223 1ST HOSP IP/OBS HIGH 75: CPT | Performed by: INTERNAL MEDICINE

## 2022-07-02 RX ORDER — MAGNESIUM SULFATE HEPTAHYDRATE 40 MG/ML
4 INJECTION, SOLUTION INTRAVENOUS ONCE
Status: COMPLETED | OUTPATIENT
Start: 2022-07-02 | End: 2022-07-02

## 2022-07-02 RX ADMIN — TACROLIMUS 1 MG: 1 CAPSULE ORAL at 05:56

## 2022-07-02 RX ADMIN — SENNOSIDES AND DOCUSATE SODIUM 2 TABLET: 50; 8.6 TABLET ORAL at 05:56

## 2022-07-02 RX ADMIN — ACETAMINOPHEN 650 MG: 325 TABLET, FILM COATED ORAL at 17:22

## 2022-07-02 RX ADMIN — SODIUM HYPOCHLORITE 1 ML: 1.25 SOLUTION TOPICAL at 17:16

## 2022-07-02 RX ADMIN — SENNOSIDES AND DOCUSATE SODIUM 2 TABLET: 50; 8.6 TABLET ORAL at 17:15

## 2022-07-02 RX ADMIN — PREDNISONE 5 MG: 10 TABLET ORAL at 05:56

## 2022-07-02 RX ADMIN — APIXABAN 5 MG: 5 TABLET, FILM COATED ORAL at 05:56

## 2022-07-02 RX ADMIN — MEROPENEM 500 MG: 500 INJECTION, POWDER, FOR SOLUTION INTRAVENOUS at 15:32

## 2022-07-02 RX ADMIN — MYCOPHENOLATE MOFETIL 500 MG: 250 CAPSULE ORAL at 05:56

## 2022-07-02 RX ADMIN — MYCOPHENOLATE MOFETIL 500 MG: 250 CAPSULE ORAL at 17:15

## 2022-07-02 RX ADMIN — MEROPENEM 500 MG: 500 INJECTION, POWDER, FOR SOLUTION INTRAVENOUS at 20:27

## 2022-07-02 RX ADMIN — HUMAN ALBUMIN MICROSPHERES AND PERFLUTREN 3 ML: 10; .22 INJECTION, SOLUTION INTRAVENOUS at 09:36

## 2022-07-02 RX ADMIN — APIXABAN 5 MG: 5 TABLET, FILM COATED ORAL at 17:15

## 2022-07-02 RX ADMIN — INSULIN HUMAN 2 UNITS: 100 INJECTION, SOLUTION PARENTERAL at 12:22

## 2022-07-02 RX ADMIN — SODIUM HYPOCHLORITE 1 ML: 1.25 SOLUTION TOPICAL at 06:00

## 2022-07-02 RX ADMIN — INSULIN HUMAN 1 UNITS: 100 INJECTION, SOLUTION PARENTERAL at 17:28

## 2022-07-02 RX ADMIN — MIDODRINE HYDROCHLORIDE 5 MG: 5 TABLET ORAL at 17:15

## 2022-07-02 RX ADMIN — MAGNESIUM SULFATE HEPTAHYDRATE 4 G: 40 INJECTION, SOLUTION INTRAVENOUS at 07:44

## 2022-07-02 RX ADMIN — MIDODRINE HYDROCHLORIDE 5 MG: 5 TABLET ORAL at 05:56

## 2022-07-02 RX ADMIN — ATORVASTATIN CALCIUM 80 MG: 80 TABLET, FILM COATED ORAL at 20:30

## 2022-07-02 RX ADMIN — INSULIN HUMAN 2 UNITS: 100 INJECTION, SOLUTION PARENTERAL at 20:32

## 2022-07-02 RX ADMIN — TACROLIMUS 1 MG: 1 CAPSULE ORAL at 17:15

## 2022-07-02 RX ADMIN — CEFEPIME 2 G: 2 INJECTION, POWDER, FOR SOLUTION INTRAVENOUS at 11:54

## 2022-07-02 ASSESSMENT — COGNITIVE AND FUNCTIONAL STATUS - GENERAL
SUGGESTED CMS G CODE MODIFIER DAILY ACTIVITY: CK
MOBILITY SCORE: 16
CLIMB 3 TO 5 STEPS WITH RAILING: A LOT
DRESSING REGULAR LOWER BODY CLOTHING: A LITTLE
DRESSING REGULAR UPPER BODY CLOTHING: A LITTLE
SUGGESTED CMS G CODE MODIFIER MOBILITY: CK
HELP NEEDED FOR BATHING: A LITTLE
TOILETING: A LOT
DAILY ACTIVITIY SCORE: 17
WALKING IN HOSPITAL ROOM: A LOT
MOVING FROM LYING ON BACK TO SITTING ON SIDE OF FLAT BED: A LITTLE
EATING MEALS: A LITTLE
PERSONAL GROOMING: A LITTLE
TURNING FROM BACK TO SIDE WHILE IN FLAT BAD: A LITTLE
STANDING UP FROM CHAIR USING ARMS: A LITTLE
MOVING TO AND FROM BED TO CHAIR: A LITTLE

## 2022-07-02 ASSESSMENT — ENCOUNTER SYMPTOMS
ROS GI COMMENTS: IMPROVING APPETITE
COUGH: 0
CHILLS: 0
SHORTNESS OF BREATH: 0
FEVER: 0

## 2022-07-02 ASSESSMENT — COPD QUESTIONNAIRES
DO YOU EVER COUGH UP ANY MUCUS OR PHLEGM?: NO/ONLY WITH OCCASIONAL COLDS OR INFECTIONS
DURING THE PAST 4 WEEKS HOW MUCH DID YOU FEEL SHORT OF BREATH: SOME OF THE TIME
COPD SCREENING SCORE: 3
HAVE YOU SMOKED AT LEAST 100 CIGARETTES IN YOUR ENTIRE LIFE: NO/DON'T KNOW

## 2022-07-02 ASSESSMENT — FIBROSIS 4 INDEX: FIB4 SCORE: 1.29

## 2022-07-02 ASSESSMENT — PAIN DESCRIPTION - PAIN TYPE: TYPE: ACUTE PAIN

## 2022-07-02 NOTE — ASSESSMENT & PLAN NOTE
At [facility] 2010.  He is followed locally by Dr. León  Continue Prograf, Prednisone, and CellCept.     Serum creatinine improved to baseline  Avoid nephrotoxic agents

## 2022-07-02 NOTE — CARE PLAN
The patient is Watcher - Medium risk of patient condition declining or worsening    Shift Goals  Clinical Goals: hemodynamic stability, ambulation, wound care  Patient Goals: rest, improve  Family Goals: improvement    Progress made toward(s) clinical / shift goals:      Problem: Knowledge Deficit - Standard  Goal: Patient and family/care givers will demonstrate understanding of plan of care, disease process/condition, diagnostic tests and medications  Outcome: Progressing     Problem: Hemodynamics  Goal: Patient's hemodynamics, fluid balance and neurologic status will be stable or improve  Outcome: Progressing     Problem: Urinary - Renal Perfusion  Goal: Ability to achieve and maintain adequate renal perfusion and functioning will improve  Outcome: Progressing     Problem: Pain - Standard  Goal: Alleviation of pain or a reduction in pain to the patient’s comfort goal  Outcome: Progressing     Patient is not progressing towards the following goals:

## 2022-07-02 NOTE — CARE PLAN
The patient is Watcher - Medium risk of patient condition declining or worsening    Shift Goals  Clinical Goals: hemodynamic stability, ambulation, wound care  Patient Goals: rest, improve  Family Goals: improvement    Progress made toward(s) clinical / shift goals:    Problem: Knowledge Deficit - Standard  Goal: Patient and family/care givers will demonstrate understanding of plan of care, disease process/condition, diagnostic tests and medications  Outcome: Progressing     Problem: Hemodynamics  Goal: Patient's hemodynamics, fluid balance and neurologic status will be stable or improve  Outcome: Progressing     Problem: Fluid Volume  Goal: Fluid volume balance will be maintained  Outcome: Progressing     Problem: Respiratory  Goal: Patient will achieve/maintain optimum respiratory ventilation and gas exchange  Outcome: Progressing     Problem: Urinary Elimination  Goal: Establish and maintain regular urinary output  Outcome: Progressing

## 2022-07-02 NOTE — ASSESSMENT & PLAN NOTE
Hx of stent  Echo reveals an EF 45% improved from last echo  Blood pressure we will start  low-dose carvedilol  Given SANKET will hold off on ACE inhibitor and Aldactone  Discussed outpatient follow-up with cardiology

## 2022-07-02 NOTE — ASSESSMENT & PLAN NOTE
Blood and urine cultures are positive for E coli.    Follow-up on repeat blood cultures from 7/3/2022 and if they remain negative plan for midline placement later today  Evaluated by ID with recommendation for 14 days of IV meropenem through 7/14/2022

## 2022-07-02 NOTE — CONSULTS
Hospital Medicine Consultation    Date of Service  7/2/2022    Referring Physician  Radhames De Leon M.D.    Consulting Physician  Naun Faulkner M.D.    Reason for Consultation  Septic shock    History of Presenting Illness  66 y.o. male who presented 6/30/2022 with painful urination.  Mr. Altman has a past medical history of polycystic kidney disease status post renal transplantation 2010 on chronic immunosuppressant therapy followed by Dr. León nephrology was most recently admitted to this hospital from 5/24/2022 through 6/22/2022 due to septic shock with blood cultures positive for E. coli bacteremia.  During that admission he had PEA arrest and required intubation.  He had repeat cultures that were negative and was treated with IV antibiotics. He was found to have a right shin nonhealing ulcerations and underwent an angiogram by Dr. Lopez vascular surgery which revealed an occlusion of the anterior tibial artery though good reperfusion distally.  During the hospitalization he went into renal failure requiring transient dialysis though his renal function improved with a creatinine of 1.4 on discharge.  He was found to have new onset atrial fibrillation and initiated on Eliquis therapy for anticoagulation.  He was discharged to [facility] for wound care.  He developed dysuria and frequency and a fever of 103 therefore was brought to the emergency room on 6/30/2022 where he was found to have septic shock and white count of 15,000 and acute kidney injury with creatinine of 2 and was admitted to intensive care unit requiring intravenous pressors and IV fluids.  Urine and blood cultures are positive again for E. Coli.    7/2/2022: Patient seen and evaluated in the ICU.  He has transfer orders to the IMCU.  He remains hypotensive.  He is tolerating a diet.  His wounds have been evaluated and we are pending a wound care consultation.  I discussed with Dr. Briggs, infectious disease for  consultation given his recurrent E. coli bacteremia.  Tacrolimus level has been ordered.    Review of Systems  Review of Systems   Constitutional: Positive for malaise/fatigue. Negative for chills and fever.   Respiratory: Negative for cough and shortness of breath.    Cardiovascular: Positive for leg swelling. Negative for chest pain.   Gastrointestinal:        Improving appetite   Musculoskeletal:        Chronic right shin ulcers   All other systems reviewed and are negative.      Past Medical History   has a past medical history of Benign essential hypertension, Hyperlipoproteinemia, Hypertension, Pain, Polycystic kidney (9/10/10), Sleep apnea, and Snoring.    Surgical History   has a past surgical history that includes knee arthroplasty total (1/12/07); knee arthroscopy (4/10/06); other orthopedic surgery (7/8/74); other (9/10/10); knee arthroscopy (5/3/2011); meniscectomy, knee, medial (5/3/2011); knee unicompartmental (12/23/2011); knee arthroscopy (12/23/2011); and knee manipulation (2/16/2012).    Family History  No family history of polycystic kidney disease.    Social History   reports that he has never smoked. He has never used smokeless tobacco. He reports that he does not drink alcohol and does not use drugs.    Medications  Prior to Admission Medications   Prescriptions Last Dose Informant Patient Reported? Taking?   SITagliptin (JANUVIA) 50 MG Tab UNKNOWN at UNKNOWN TIME MAR from Other Facility No No   Sig: Take 1 Tablet by mouth every day.   acetaminophen (TYLENOL) 325 MG Tab UNKNOWN at UNKNOWN TIME MAR from Other Facility Yes Yes   Sig: Take 325-650 mg by mouth every four hours as needed for Mild Pain, Moderate Pain or Fever. NOT TO EXCEED 3,000 mg of acetaminophen per 24 hours.   apixaban (ELIQUIS) 5mg Tab UNKNOWN at UNKNOWN TIME MAR from Other Facility No No   Sig: Take 1 Tablet by mouth 2 times a day. Indications: Thromboembolism secondary to Atrial Fibrillation   atorvastatin (LIPITOR) 80 MG  tablet UNKNOWN at UNKNOWN TIME MAR from Other Facility No No   Sig: Take 1 Tablet by mouth at bedtime.   carboxymethylcellulose (REFRESH TEARS) 0.5 % Solution UNKNOWN at UNKNOWN TIME MAR from Other Facility Yes Yes   Sig: Administer 1 Drop into both eyes 3 times a day.   gabapentin (NEURONTIN) 300 MG Cap UNKNOWN at UNKNOWN TIME MAR from Other Facility Yes Yes   Sig: Take 300 mg by mouth at bedtime.   glyBURIDE (DIABETA) 5 MG Tab UNKNOWN at UNKNOWN TIME MAR from Other Facility Yes Yes   Sig: Take 10 mg by mouth every morning with breakfast. 2 tablets = 10 mg.   linagliptin (TRADJENTA) 5 MG Tab tablet UNKNOWN at UNKNOWN TIME MAR from Other Facility Yes Yes   Sig: Take 5 mg by mouth every day.   midodrine (PROAMATINE) 5 MG Tab UNKNOWN at UNKNOWN TIME MAR from Other Facility No No   Sig: Take 1 Tablet by mouth 2 times a day.   mycophenolate (CELLCEPT) 250 MG Cap UNKNOWN at UNKNOWN TIME MAR from Other Facility No No   Sig: Take 2 Capsules by mouth 2 times a day.   predniSONE (DELTASONE) 5 MG Tab UNKNOWN at UNKNOWN TIME MAR from Other Facility No No   Sig: Take 1 Tablet by mouth every day.   tacrolimus (PROGRAF) 1 MG Cap UNKNOWN at UNKNOWN TIME MAR from Other Facility No No   Sig: Take 1 Capsule by mouth 2 times a day.      Facility-Administered Medications: None       Allergies  Allergies   Allergen Reactions   • Doxycycline Rash     Sweats and shakes: 9/28/17: Clarified allergy with patient. Allergy was in 1998 and he doesn't remember what happened. He thought the medication is for pain.  Tolerates doxycycline 9/2017       Physical Exam  Temp:  [36.2 °C (97.2 °F)-38 °C (100.4 °F)] 36.7 °C (98 °F)  Pulse:  [] 78  Resp:  [13-49] 29  BP: ()/(53-81) 92/61  SpO2:  [88 %-100 %] 98 %    Physical Exam  Vitals and nursing note reviewed.   Constitutional:       General: He is not in acute distress.     Appearance: He is not toxic-appearing.   HENT:      Head: Normocephalic and atraumatic.      Mouth/Throat:       Mouth: Mucous membranes are moist.      Pharynx: Oropharynx is clear.   Eyes:      General: No scleral icterus.     Conjunctiva/sclera: Conjunctivae normal.   Neck:      Comments: Right IJ central line  Cardiovascular:      Rate and Rhythm: Normal rate and regular rhythm.   Pulmonary:      Effort: Pulmonary effort is normal.      Breath sounds: Normal breath sounds.   Abdominal:      General: There is no distension.      Palpations: Abdomen is soft.      Tenderness: There is no abdominal tenderness.   Musculoskeletal:      Cervical back: Neck supple.      Comments: Right foot edema  Scab dorsum right foot  3 large ulcers on the right shin with granulation tissue  No cellulitis    Skin:     General: Skin is warm and dry.   Neurological:      General: No focal deficit present.      Mental Status: He is alert and oriented to person, place, and time.   Psychiatric:         Mood and Affect: Mood normal.         Behavior: Behavior normal.         Thought Content: Thought content normal.         Judgment: Judgment normal.         Fluids  Date 07/02/22 0700 - 07/03/22 0659   Shift 4249-8729 5428-4554 5503-4052 24 Hour Total   INTAKE   P.O. 240   240   Shift Total 240   240   OUTPUT   Urine 600   600   Shift Total 600   600   Weight (kg) 98.8 98.8 98.8 98.8       Laboratory  Recent Labs     06/30/22 2245 07/01/22 1242 07/02/22  0440   WBC 14.7* 19.8* 11.2*   RBC 3.73* 3.17* 3.01*   HEMOGLOBIN 10.1* 8.7* 8.1*   HEMATOCRIT 32.8* 28.1* 26.5*   MCV 87.9 88.6 88.0   MCH 27.1 27.4 26.9*   MCHC 30.8* 31.0* 30.6*   RDW 48.9 48.8 49.5   PLATELETCT 247 218 176   MPV 10.5 10.4 10.8     Recent Labs     06/30/22 2245 07/01/22 1242 07/02/22  0440   SODIUM 133* 131* 130*   POTASSIUM 5.0 5.0 5.1   CHLORIDE 101 103 100   CO2 21 20 21   GLUCOSE 155* 143* 156*   BUN 25* 22 23*   CREATININE 1.98* 1.71* 1.57*   CALCIUM 7.8* 7.4* 7.8*     Recent Labs     06/30/22 2245 07/01/22  0512   APTT 37.3*  --    INR 1.61* 1.65*                  Imaging  EC-ECHOCARDIOGRAM COMPLETE W/ CONT         DX-CHEST-FOR LINE PLACEMENT Perform procedure in: Patient's Room   Final Result         1.  Interstitial edema and/or infiltrate.   2.  Atherosclerosis      CT-RENAL COLIC EVALUATION(A/P W/O)   Final Result         1.  Changes compatible with polycystic kidney disease.   2.  Diverticulosis   3.  Fat-containing left inguinal hernia      DX-CHEST-PORTABLE (1 VIEW)   Final Result         1.  Patchy bilateral pulmonary infiltrates or edema.   2.  Atherosclerosis          Assessment/Plan  * Septic shock (HCC)- (present on admission)  Assessment & Plan  This is Septic shock Present on admission  SIRS criteria identified on my evaluation include: Tachycardia, with heart rate greater than 90 BPM  Indicators of septic shock include: Severe sepsis present and persistent hypotension after 30 ml/kg completed.   Sources is: UTI with bacteremia   Sepsis protocol initiated  Crystalloid Fluid Administration: was given  IV antibiotics as appropriate for source of sepsis  Reassessment: I have reassessed the patient's hemodynamic status  He has required IV pressors.  Midodrine has been started 5 mg TID  If his blood pressure drops, consider stress-dose steroids given his chronic prednisone use.      Bacteremia due to Escherichia coli- (present on admission)  Assessment & Plan  Blood and urine cultures are positive for E coli.   Empiric IV Cefepime and await sensitivities.  He had nearly pan-sensitive E coli bacteremia 5/24 with subsequent negative cultures.   Dr. Briggs infectious disease to consult.    Non-healing wound of right lower extremity- (present on admission)  Assessment & Plan  Extensive ulcers of the right shin  Wound care consult  He has an angiogram last admission by Dr. Lopez which revealed occlusion of the anterior tibial artery with good perfusion of the foot.     Acute-on-chronic kidney injury (HCC)- (present on admission)  Assessment & Plan  Hx of  transplant  On last admission he required dialysis   Cr 2 on admission likely secondary to septic shock as his baseline is about 1.4    Urinary tract infection- (present on admission)  Assessment & Plan  E coli with bacteremia     PAF (paroxysmal atrial fibrillation) ([facility])- (present on admission)  Assessment & Plan  Continue Eliquis for anticoagulation     Type 2 diabetes mellitus with kidney complication, without long-term current use of insulin ([facility])- (present on admission)  Assessment & Plan  He had been on Glyburide, Linagliptin, and Januvia as an outpatient   HbA1c was 10 a month ago  Sliding scale insulin for now and consider long-acting insulin as his diet improves    CAD (coronary artery disease)- (present on admission)  Assessment & Plan  Hx of stent  Echo reveals an EF 45% thus he is at risk of volume overload     History of renal transplant- (present on admission)  Assessment & Plan  At [facility] 2010.  He is followed locally by Dr. León  Continue Prograf, Prednisone, and CellCept.   Tacrolimus level ordered.

## 2022-07-02 NOTE — PROGRESS NOTES
2 RN skin check complete with LAN Gaytan.    Devices in place SCDs, BP cuff, pulse ox, EKG cables, R IJ central, RUE PIV.     Skin assessed under the above listed devices following devices.     Following areas of concern:   · Redness to sacrum with a little bit of breakdown, blanching  *    Bruising to bilateral buttock, slow to tyra  * Small open wounds to bilateral buttocks  * Bruising to R elbow  * Bruise to R upper arm  * Scabs and bruises to bilateral shoulders  * Ears red and slow to tyra  * L heal red, slow to tyra and dry  * R lower leg, foot, and L knee covered with dressings. Wound consult placed. Dressing to be changed once wound RN places wound care instructions.  * Generalized bruising to entire abdomen      The following interventions in place mepilex in place on sacrum, L knee and L wound. Pillows used for repositioning and to float heels    Wound consult placedYES/NO: yes    Wound reported YES/NO: yes  Appropriate LDAs opened YES/NO: yes

## 2022-07-02 NOTE — ASSESSMENT & PLAN NOTE
Hx of transplant  On last admission he required dialysis   Cr 2 on admission likely secondary to septic shock and has come down to 1.27.

## 2022-07-02 NOTE — CARE PLAN
The patient is Watcher - Medium risk of patient condition declining or worsening    Shift Goals  Clinical Goals: hemodynamic stability, ambulation, wound care  Patient Goals: rest, improve  Family Goals: improvement    Progress made toward(s) clinical / shift goals:    Problem: Knowledge Deficit - Standard  Goal: Patient and family/care givers will demonstrate understanding of plan of care, disease process/condition, diagnostic tests and medications  Outcome: Progressing   Educated on POC  Problem: Hemodynamics  Goal: Patient's hemodynamics, fluid balance and neurologic status will be stable or improve  Outcome: Progressing   Monitoring HR, BP, SpO2, RR, and urine output

## 2022-07-02 NOTE — ASSESSMENT & PLAN NOTE
Extensive ulcers of the right shin  Continue wound care  He has an angiogram last admission by Dr. Lopez which revealed occlusion of the anterior tibial artery with good perfusion of the foot. He had been on the max dose of lipitor with an LDL 47 thus drop lipitor to 20 mg daily.     SNF referral discussed with case management

## 2022-07-02 NOTE — PROGRESS NOTES
Patient transferred to [facility] from room T627.  A&O4.    Report given to LAN Wisdom. Patient sent with 2 RN's on a portable monitor via ICU bed with belongings which included a shirt, phone, and a phone .

## 2022-07-02 NOTE — CONSULTS
INFECTIOUS DISEASES INPATIENT CONSULT NOTE     Date of Service:[unfilled]     Consult Requested By: Naun Faulkner M.D.    Reason for Consultation: Ecoli UTI and sepsis    History of Present Illness:   Jama Altman is a 66 y.o. diabetic male with h/o renal transplant admitted 6/30/2022 with 2 day h/o N/V, dysuria, and frequency.  Was in SNF. Unknown sick contacts.  Known poorly controlled diabetes mellitus, renal transplant on chronic immunosuppression, stage III chronic kidney disease, right total knee arthroplasty, and Afib on Plavix   Known from recent admission 5/24/2022-6/22/2022 after a ground-level fall -found to have cellulitis and resistant Ecoli UTI with septic shock. Blood cultures negative 5/28/2022. That hospitalization complicated by PEA arrest, SANKET requiring HD, and mechanical ventilation. Necrotic ulcerations RLE due to arterial disease present. Review of records also show revascularization RLE done 6/17/2022. Wound pictures reviewed. Completed antibiotics on 6/6/2022.  In ED had fever 101.6, BP 85/48 tachycardia 109-122 WBC 11.2 Creat initially 1.7 now 1.5  Not hypoxemic  Admitted to ICU for septic shock on Levophed and vasopressin.  Blood cultures are now positive for Ecoli. Urine culture Ecoli  On cefepime. Pressors are currently on weaned and hold  RLE dressed. Left knee dressed  Infectious Diseases consulted for antibiotic recommendation and management    Review Of Systems:  + fever, resolved  Feeling better    All other systems are reviewed and are negative     PMH:   Past Medical History:   Diagnosis Date   • Benign essential hypertension    • Hyperlipoproteinemia    • Hypertension     not on meds anymore   • Pain    • Polycystic kidney 9/10/10    RIGHT KIDNEY TRANSPLANT   • Sleep apnea    • Snoring          PSH:  Past Surgical History:   Procedure Laterality Date   • KNEE MANIPULATION  2/16/2012    Performed by LATOYA CONNER at SURGERY [facility]   • KNEE UNICOMPARTMENTAL  12/23/2011     Performed by LATOYA CONNER at SURGERY [facility]   • KNEE ARTHROSCOPY  12/23/2011    Performed by LATOYA CONNER at SURGERY [facility]   • KNEE ARTHROSCOPY  5/3/2011    Performed by HANANE GOLDMAN at SURGERY [facility]   • MENISCECTOMY, KNEE, MEDIAL  5/3/2011    Performed by HANANE GOLDMAN at SURGERY [facility]   • OTHER  9/10/10    RIGHT KIDNEY TRANSPLANT   • KNEE ARTHROPLASTY TOTAL  1/12/07    RIGHT   • KNEE ARTHROSCOPY  4/10/06    RIGHT   • OTHER ORTHOPEDIC SURGERY  7/8/74    LEFT KNEE DEBRIDEMENT       FAMILY HX:  PCKD    SOCIAL HX:  Social History     Socioeconomic History   • Marital status:      Spouse name: Not on file   • Number of children: Not on file   • Years of education: Not on file   • Highest education level: Not on file   Occupational History   • Not on file   Tobacco Use   • Smoking status: Never Smoker   • Smokeless tobacco: Never Used   Vaping Use   • Vaping Use: Never used   Substance and Sexual Activity   • Alcohol use: No   • Drug use: No   • Sexual activity: Yes     Partners: Female   Other Topics Concern   • Not on file   Social History Narrative   • Not on file     Social Determinants of Health     Financial Resource Strain: Not on file   Food Insecurity: Not on file   Transportation Needs: Not on file   Physical Activity: Not on file   Stress: Not on file   Social Connections: Not on file   Intimate Partner Violence: Not on file   Housing Stability: Not on file     Social History     Tobacco Use   Smoking Status Never Smoker   Smokeless Tobacco Never Used     Social History     Substance and Sexual Activity   Alcohol Use No       Allergies/Intolerances:  Allergies   Allergen Reactions   • Doxycycline Rash     Sweats and shakes: 9/28/17: Clarified allergy with patient. Allergy was in 1998 and he doesn't remember what happened. He thought the medication is for pain.  Tolerates doxycycline 9/2017         Other Current Medications:    Current  Facility-Administered Medications:   •  norepinephrine (Levophed) 8 mg in 250 mL NS infusion (premix), 0-30 mcg/min, Intravenous, Continuous, Elisa Irvin M.D., Stopped at 07/01/22 1800  •  senna-docusate (PERICOLACE or SENOKOT S) 8.6-50 MG per tablet 2 Tablet, 2 Tablet, Oral, BID, 2 Tablet at 07/02/22 0556 **AND** polyethylene glycol/lytes (MIRALAX) PACKET 1 Packet, 1 Packet, Oral, QDAY PRN **AND** magnesium hydroxide (MILK OF MAGNESIA) suspension 30 mL, 30 mL, Oral, QDAY PRN **AND** bisacodyl (DULCOLAX) suppository 10 mg, 10 mg, Rectal, QDAY PRN, Pepe Tran M.D.  •  Respiratory Therapy Consult, , Nebulization, Continuous RT, Pepe Tran M.D.  •  acetaminophen (Tylenol) tablet 650 mg, 650 mg, Oral, Q6HRS PRN, Pepe Tran M.D.  •  apixaban (ELIQUIS) tablet 5 mg, 5 mg, Oral, BID, Pepe Tran M.D., 5 mg at 07/02/22 0556  •  atorvastatin (LIPITOR) tablet 80 mg, 80 mg, Oral, QHS, Pepe Tran M.D., 80 mg at 07/01/22 2003  •  cefepime (Maxipime) 2 g in dextrose 5% 20 mL IV syringe, 2 g, Intravenous, Q12HR, Radhames De Leon M.D., Stopped at 07/02/22 1159  •  insulin regular (HumuLIN R,NovoLIN R) injection, 1-6 Units, Subcutaneous, 4X/DAY ACHS, 2 Units at 07/02/22 1222 **AND** POC blood glucose manual result, , , Q AC AND BEDTIME(S) **AND** NOTIFY MD and PharmD, , , Once **AND** Administer 20 grams of glucose (approximately 8 ounces of fruit juice) every 15 minutes PRN FSBG less than 70 mg/dL, , , PRN **AND** dextrose 50% (D50W) injection 25 g, 25 g, Intravenous, Q15 MIN PRN, Pepe Tran M.D.  •  midodrine (PROAMATINE) tablet 5 mg, 5 mg, Oral, BID, Pepe Tran M.D., 5 mg at 07/02/22 0556  •  dakins 0.125% (1/4 strength) topical soln, , Topical, BID, Jeremy M Gonda, M.D., 1 mL at 07/02/22 0600  •  mycophenolate (CELLCEPT) capsule 500 mg, 500 mg, Oral, BID, Baljit Martínez M.D., 500 mg at 07/02/22 0556  •  predniSONE (DELTASONE) tablet 5 mg, 5 mg, Oral, DAILY, Baljit Martínez M.D., 5 mg at 07/02/22 0556  •   "tacrolimus (PROGRAF) capsule 1 mg, 1 mg, Oral, BID, Baljit Martínez M.D., 1 mg at 22 0556  •  guaiFENesin (ROBITUSSIN) 100 MG/5ML solution 100 mg, 5 mL, Oral, Q6HRS PRN, Cathy Arrieta, 100 mg at 22 2207      Most Recent Vital Signs:  BP (!) 92/61   Pulse 78   Temp 36.7 °C (98 °F) (Temporal)   Resp (!) 29   Ht 1.956 m (6' 5\")   Wt 98.8 kg (217 lb 13 oz)   SpO2 98%   BMI 25.83 kg/m²   Temp  Av.2 °C (99 °F)  Min: 36.2 °C (97.2 °F)  Max: 38.7 °C (101.6 °F)    Physical Exam:  General: Well-appearing, well nourished no acute distress Talking on phone  HEENT: NCAT, PERRLA, sclera anicteric, PERRL, EOMI,  no oral lesions Dentition good  Neck: supple, no lymphadenopathy right IJ  Chest: CTAB, unlabored.  Cardiac: IRR,  no m/r/g   Abdomen: + bowel sounds, soft, non-tender, non-distended  Extremities: No cyanosis, clubbing. no edema, 2+ pulses left knee 2 cm wound RLE dressed toes warm  Skin: no rashes RLE with sharply demarcated ulcers-+granulation medial wound looks grafted  Neuro: Alert and oriented times 3, Speech fluent CN intact HORN  Psych: Mood normal Affect normal    Pertinent Lab Results:  Recent Labs     22  1242 22  0440   WBC 14.7* 19.8* 11.2*      Recent Labs     22  1242 22  0440   HEMOGLOBIN 10.1* 8.7* 8.1*   HEMATOCRIT 32.8* 28.1* 26.5*   MCV 87.9 88.6 88.0   MCH 27.1 27.4 26.9*   PLATELETCT 247 218 176         Recent Labs     22  1242 22  0440   SODIUM 133* 131* 130*   POTASSIUM 5.0 5.0 5.1   CHLORIDE 101 103 100   CO2 21 20 21   CREATININE 1.98* 1.71* 1.57*        Recent Labs     22  2245   ALBUMIN 2.9*        Pertinent Micro:  Results     Procedure Component Value Units Date/Time    URINE CULTURE(NEW) [[id number]]  (Abnormal) Collected: 22 0036    Order Status: Completed Specimen: Urine Updated: 22 1321     Significant Indicator POS     Source UR     Site -     Culture Result Usual " "skin shaun 10-50,000 cfu/mL      Escherichia coli  >100,000 cfu/mL  Susceptibilities in progress      Narrative:      Indication for culture:->Evaluation for sepsis without a  clear source of infection  Indication for culture:->Evaluation for sepsis without a    BLOOD CULTURE [[id number]]  (Abnormal) Collected: 06/30/22 2245    Order Status: Completed Specimen: Blood from Peripheral Updated: 07/02/22 1250     Significant Indicator POS     Source BLD     Site PERIPHERAL     Culture Result Growth detected by Bactec instrument. 07/01/2022  13:35      Escherichia coli  Susceptibilities in progress      Narrative:      CALL  [place]  161 tel. [phone],  CALLED  161 tel. [phone] 07/01/2022, 13:36, RB PERF. RESULTS CALLED TO:  [id number]  Per Hospital Policy: Only change Specimen Src: to \"Line\" if  specified by physician order.  Right AC    BLOOD CULTURE [[id number]]  (Abnormal) Collected: 06/30/22 2328    Order Status: Completed Specimen: Blood from Peripheral Updated: 07/02/22 1250     Significant Indicator POS     Source BLD     Site PERIPHERAL     Culture Result Growth detected by Bactec instrument. 07/01/2022  13:20      Escherichia coli    Narrative:      CALL  [facility]  161 tel. [phone],  CALLED  161 tel. [phone] 07/01/2022, 13:22, RB PERF. RESULTS CALLED TO:  [phone] RN  Per Hospital Policy: Only change Specimen Src: to \"Line\" if  specified by physician order.  Right Hand    CoV-2 and Flu A/B by PCR (24 hour In-House): Collect NP swab in [facility] [[id number]] Collected: 07/01/22 0206    Order Status: Completed Specimen: Respirate from Nasopharyngeal Updated: 07/01/22 1705     Influenza virus A RNA Negative     Influenza virus B, PCR Negative     SARS-CoV-2 by PCR NotDetected     Comment: PATIENTS: Important information regarding your results and instructions can  be found at [email or web address]   \"After your  Covid-19 Test\"    [facility] providers: PLEASE REFER TO DE-ESCALATION AND RETESTING " PROTOCOL  on [email or web address]    **The Roche SARS-CoV-2 RT-PCR test has been made available for use under the  Emergency Use Authorization (EUA) only.          SARS-CoV-2 Source NP Swab    URINALYSIS [[id number]]  (Abnormal) Collected: 07/01/22 0036    Order Status: Completed Specimen: Urine Updated: 07/01/22 0158     Color Yellow     Character Turbid     Specific Gravity 1.014     Ph 5.0     Glucose Negative mg/dL      Ketones Negative mg/dL      Protein 100 mg/dL      Bilirubin Negative     Urobilinogen, Urine 0.2     Nitrite Positive     Leukocyte Esterase Large     Occult Blood Moderate     Micro Urine Req Microscopic    Narrative:      Indication for culture:->Evaluation for sepsis without a  clear source of infection        Blood Culture   Date Value Ref Range Status   09/27/2017 No growth after 5 days of incubation.  Final     Blood Culture Hold   Date Value Ref Range Status   09/27/2017 Collected  Final        Studies:    IMPRESSION:   Ecoli UTI  Ecoli septic shock  Renal transplant- due to polycystic kidney disease  9/10/2010  On mycophenolate, prednisone, and tacrolimus. STates creat post-transplant 1.9  DM  Afib  PVD  RLE arterial ulcerations; left knee wound. Picture review show significant improvement  Leukocytosis    PLAN:   FU blood and urine E coli-sensi pending  Recent Ecoli resistant to ampicillin, T/S, intermediate resistance to Unasyn  Change cefepime to meropenem pending susceptibilities  Continue current immunosuppression as clinically much improved  Avoid nephrotoxins  Continue wound care        Plan of care discussed with TIFFANY Faulkner M.D.. Will continue to follow    Tania Briggs M.D.

## 2022-07-03 LAB
ANION GAP SERPL CALC-SCNC: 10 MMOL/L (ref 7–16)
BACTERIA BLD CULT: ABNORMAL
BACTERIA UR CULT: ABNORMAL
BACTERIA UR CULT: ABNORMAL
BUN SERPL-MCNC: 20 MG/DL (ref 8–22)
CALCIUM SERPL-MCNC: 8 MG/DL (ref 8.5–10.5)
CHLORIDE SERPL-SCNC: 100 MMOL/L (ref 96–112)
CO2 SERPL-SCNC: 20 MMOL/L (ref 20–33)
CREAT SERPL-MCNC: 1.27 MG/DL (ref 0.5–1.4)
ERYTHROCYTE [DISTWIDTH] IN BLOOD BY AUTOMATED COUNT: 48.4 FL (ref 35.9–50)
GFR SERPLBLD CREATININE-BSD FMLA CKD-EPI: 62 ML/MIN/1.73 M 2
GLUCOSE BLD STRIP.AUTO-MCNC: 168 MG/DL (ref 65–99)
GLUCOSE BLD STRIP.AUTO-MCNC: 170 MG/DL (ref 65–99)
GLUCOSE BLD STRIP.AUTO-MCNC: 234 MG/DL (ref 65–99)
GLUCOSE BLD STRIP.AUTO-MCNC: 263 MG/DL (ref 65–99)
GLUCOSE SERPL-MCNC: 196 MG/DL (ref 65–99)
HCT VFR BLD AUTO: 30.4 % (ref 42–52)
HGB BLD-MCNC: 9.3 G/DL (ref 14–18)
MAGNESIUM SERPL-MCNC: 1.9 MG/DL (ref 1.5–2.5)
MCH RBC QN AUTO: 27 PG (ref 27–33)
MCHC RBC AUTO-ENTMCNC: 30.6 G/DL (ref 33.7–35.3)
MCV RBC AUTO: 88.4 FL (ref 81.4–97.8)
PHOSPHATE SERPL-MCNC: 2.4 MG/DL (ref 2.5–4.5)
PLATELET # BLD AUTO: 178 K/UL (ref 164–446)
PMV BLD AUTO: 10.5 FL (ref 9–12.9)
POTASSIUM SERPL-SCNC: 4.5 MMOL/L (ref 3.6–5.5)
RBC # BLD AUTO: 3.44 M/UL (ref 4.7–6.1)
SIGNIFICANT IND 70042: ABNORMAL
SITE SITE: ABNORMAL
SODIUM SERPL-SCNC: 130 MMOL/L (ref 135–145)
SOURCE SOURCE: ABNORMAL
WBC # BLD AUTO: 9.1 K/UL (ref 4.8–10.8)

## 2022-07-03 PROCEDURE — 87040 BLOOD CULTURE FOR BACTERIA: CPT

## 2022-07-03 PROCEDURE — 700111 HCHG RX REV CODE 636 W/ 250 OVERRIDE (IP): Performed by: EMERGENCY MEDICINE

## 2022-07-03 PROCEDURE — 84100 ASSAY OF PHOSPHORUS: CPT

## 2022-07-03 PROCEDURE — 83735 ASSAY OF MAGNESIUM: CPT

## 2022-07-03 PROCEDURE — 700102 HCHG RX REV CODE 250 W/ 637 OVERRIDE(OP): Performed by: STUDENT IN AN ORGANIZED HEALTH CARE EDUCATION/TRAINING PROGRAM

## 2022-07-03 PROCEDURE — 770001 HCHG ROOM/CARE - MED/SURG/GYN PRIV*

## 2022-07-03 PROCEDURE — 99233 SBSQ HOSP IP/OBS HIGH 50: CPT | Performed by: INTERNAL MEDICINE

## 2022-07-03 PROCEDURE — 700101 HCHG RX REV CODE 250: Performed by: INTERNAL MEDICINE

## 2022-07-03 PROCEDURE — 85027 COMPLETE CBC AUTOMATED: CPT

## 2022-07-03 PROCEDURE — 82962 GLUCOSE BLOOD TEST: CPT | Mod: 91

## 2022-07-03 PROCEDURE — 97597 DBRDMT OPN WND 1ST 20 CM/<: CPT

## 2022-07-03 PROCEDURE — 99233 SBSQ HOSP IP/OBS HIGH 50: CPT | Performed by: HOSPITALIST

## 2022-07-03 PROCEDURE — 700105 HCHG RX REV CODE 258: Performed by: INTERNAL MEDICINE

## 2022-07-03 PROCEDURE — A9270 NON-COVERED ITEM OR SERVICE: HCPCS | Performed by: STUDENT IN AN ORGANIZED HEALTH CARE EDUCATION/TRAINING PROGRAM

## 2022-07-03 PROCEDURE — 80048 BASIC METABOLIC PNL TOTAL CA: CPT

## 2022-07-03 PROCEDURE — 97598 DBRDMT OPN WND ADDL 20CM/<: CPT

## 2022-07-03 PROCEDURE — 700111 HCHG RX REV CODE 636 W/ 250 OVERRIDE (IP): Performed by: INTERNAL MEDICINE

## 2022-07-03 RX ADMIN — MEROPENEM 500 MG: 500 INJECTION, POWDER, FOR SOLUTION INTRAVENOUS at 03:00

## 2022-07-03 RX ADMIN — MEROPENEM 500 MG: 500 INJECTION, POWDER, FOR SOLUTION INTRAVENOUS at 14:23

## 2022-07-03 RX ADMIN — SENNOSIDES AND DOCUSATE SODIUM 2 TABLET: 50; 8.6 TABLET ORAL at 17:14

## 2022-07-03 RX ADMIN — TACROLIMUS 1 MG: 1 CAPSULE ORAL at 06:25

## 2022-07-03 RX ADMIN — APIXABAN 5 MG: 5 TABLET, FILM COATED ORAL at 06:24

## 2022-07-03 RX ADMIN — INSULIN HUMAN 2 UNITS: 100 INJECTION, SOLUTION PARENTERAL at 17:14

## 2022-07-03 RX ADMIN — MEROPENEM 500 MG: 500 INJECTION, POWDER, FOR SOLUTION INTRAVENOUS at 21:14

## 2022-07-03 RX ADMIN — APIXABAN 5 MG: 5 TABLET, FILM COATED ORAL at 17:15

## 2022-07-03 RX ADMIN — MYCOPHENOLATE MOFETIL 500 MG: 250 CAPSULE ORAL at 06:25

## 2022-07-03 RX ADMIN — ATORVASTATIN CALCIUM 80 MG: 80 TABLET, FILM COATED ORAL at 19:52

## 2022-07-03 RX ADMIN — MYCOPHENOLATE MOFETIL 500 MG: 250 CAPSULE ORAL at 18:36

## 2022-07-03 RX ADMIN — INSULIN HUMAN 1 UNITS: 100 INJECTION, SOLUTION PARENTERAL at 21:12

## 2022-07-03 RX ADMIN — ACETAMINOPHEN 650 MG: 325 TABLET, FILM COATED ORAL at 19:52

## 2022-07-03 RX ADMIN — INSULIN HUMAN 1 UNITS: 100 INJECTION, SOLUTION PARENTERAL at 08:55

## 2022-07-03 RX ADMIN — MIDODRINE HYDROCHLORIDE 5 MG: 5 TABLET ORAL at 06:25

## 2022-07-03 RX ADMIN — PREDNISONE 5 MG: 10 TABLET ORAL at 06:24

## 2022-07-03 RX ADMIN — MEROPENEM 500 MG: 500 INJECTION, POWDER, FOR SOLUTION INTRAVENOUS at 08:55

## 2022-07-03 RX ADMIN — INSULIN HUMAN 3 UNITS: 100 INJECTION, SOLUTION PARENTERAL at 12:47

## 2022-07-03 RX ADMIN — SODIUM HYPOCHLORITE 1 ML: 1.25 SOLUTION TOPICAL at 12:00

## 2022-07-03 RX ADMIN — TACROLIMUS 1 MG: 1 CAPSULE ORAL at 18:36

## 2022-07-03 RX ADMIN — MIDODRINE HYDROCHLORIDE 5 MG: 5 TABLET ORAL at 17:15

## 2022-07-03 ASSESSMENT — ENCOUNTER SYMPTOMS
SHORTNESS OF BREATH: 0
CONSTIPATION: 0
MYALGIAS: 0
ABDOMINAL PAIN: 0
FLANK PAIN: 0
NERVOUS/ANXIOUS: 0
NAUSEA: 0
FEVER: 0
ROS GI COMMENTS: POOR APPETITE
CHILLS: 0
VOMITING: 0
SPUTUM PRODUCTION: 0
COUGH: 0
DIARRHEA: 0

## 2022-07-03 ASSESSMENT — FIBROSIS 4 INDEX: FIB4 SCORE: 1.28

## 2022-07-03 ASSESSMENT — PAIN DESCRIPTION - PAIN TYPE
TYPE: ACUTE PAIN

## 2022-07-03 NOTE — CARE PLAN
The patient is Stable - Low risk of patient condition declining or worsening    Shift Goals  Clinical Goals: transfer,stable bp  Patient Goals: rest,discharge  Family Goals: maryellen    Progress made toward(s) clinical / shift goals:  BP WNL. No pressors. FSBG wnl. Pt. Is urinating appropriately. No o2 required.     Patient is not progressing towards the following goals:

## 2022-07-03 NOTE — WOUND TEAM
[facility]  Inpatient Services  Initial Wound and Skin Care Evaluation    Admission Date: 6/30/2022     Last order of IP CONSULT TO WOUND CARE was found on 7/1/2022 from Hospital Encounter on 6/30/2022     HPI, PMH, SH: Reviewed    Past Surgical History:   Procedure Laterality Date   • KNEE MANIPULATION  2/16/2012    Performed by LATOYA CONNER at SURGERY [facility] ORS   • KNEE UNICOMPARTMENTAL  12/23/2011    Performed by LATOYA CONNER at SURGERY [facility] ORS   • KNEE ARTHROSCOPY  12/23/2011    Performed by LATOYA CONNER at SURGERY [facility] ORS   • KNEE ARTHROSCOPY  5/3/2011    Performed by HANANE GOLDMAN at SURGERY SAME DAY [facility] ORS   • MENISCECTOMY, KNEE, MEDIAL  5/3/2011    Performed by HANANE GOLDMAN at SURGERY SAME DAY [facility] ORS   • OTHER  9/10/10    RIGHT KIDNEY TRANSPLANT   • KNEE ARTHROPLASTY TOTAL  1/12/07    RIGHT   • KNEE ARTHROSCOPY  4/10/06    RIGHT   • OTHER ORTHOPEDIC SURGERY  7/8/74    LEFT KNEE DEBRIDEMENT     Social History     Tobacco Use   • Smoking status: Never Smoker   • Smokeless tobacco: Never Used   Substance Use Topics   • Alcohol use: No     Chief Complaint   Patient presents with   • N/V     BIBA from [facility] for n/v x 2 days and UTI symptoms x2 days, states having burning,frequency,cloudy color  RUQ and RLQ abdominal pain  Was recently discharged 1 week ago after admission for urosepsis  4 zofran PTA and 300ml   • Painful Urination     Diagnosis: Septic shock ([facility]) [A41.9, R65.21]    Unit where seen by Wound Team: [id number]     WOUND CONSULT/FOLLOW UP RELATED TO:  L knee, right LE ant, right LE med-post, and right dorsal lateral foot.      WOUND HISTORY:  Mr. Altman has a past medical history of polycystic kidney disease status post renal transplantation 2010 on chronic immunosuppressant therapy followed by Dr. León nephrology was most recently admitted to this hospital from 5/24/2022 through 6/22/2022 due to septic shock with blood cultures  positive for E. coli bacteremia.  During that admission he had PEA arrest and required intubation.  He had repeat cultures that were negative and was treated with IV antibiotics. He was found to have a right shin nonhealing ulcerations and underwent an angiogram by Dr. Lopez vascular surgery which revealed an occlusion of the anterior tibial artery though good reperfusion distally.  During the hospitalization he went into renal failure requiring transient dialysis though his renal function improved with a creatinine of 1.4 on discharge.  He was found to have new onset atrial fibrillation and initiated on Eliquis therapy for anticoagulation.  He was discharged to [facility] for wound care.  He developed dysuria and frequency and a fever of 103 therefore was brought to the emergency room on 6/30/2022 where he was found to have septic shock and white count of 15,000 and acute kidney injury with creatinine of 2 and was admitted to intensive care unit requiring intravenous pressors and IV fluids.     WOUND ASSESSMENT/LDA     Wound 05/25/22 Soft Tissue Necrosis Leg Anterior Right (Active)   Wound Image   07/03/22 1300   Site Assessment Pink;Red;Yellow;Slough 07/03/22 1300   Periwound Assessment Clean;Dry;Intact;Scar tissue 07/03/22 1300   Margins Defined edges 07/03/22 1300   Drainage Amount Small 07/03/22 1300   Drainage Description Serosanguineous 07/03/22 1300   Treatments CSWD - Conservative Sharp Wound Debridement;Cleansed;Irrigation;Site care 07/03/22 1300   Wound Cleansing Approved Wound Cleanser 07/03/22 1300   Periwound Protectant Skin Protectant Wipes to Periwound 07/03/22 1300   Dressing Cleansing/Solutions Not Applicable 07/03/22 1300   Dressing Options Hydrofiber Silver;Absorbent Abdominal Pad;Dry Roll Gauze;Tubigrip 07/03/22 1300   Dressing Changed New 07/03/22 1300   Dressing Status Clean;Dry;Intact 07/03/22 1300   Dressing Change/Treatment Frequency Every 48 hrs, and As Needed 07/03/22  1300   NEXT Dressing Change/Treatment Date 07/05/22 07/03/22 1300   NEXT Weekly Photo (Inpatient Only) 07/10/22 07/03/22 1300   Shape 3 separate wounds that are located at the anterior aspect of the right leg.  has a small bridge to the medial posterior wound, but partial thickness bridge, likely to heal with wound care. 07/03/22 1300   Wound Odor None 07/03/22 1300   Pulses Right;1+;DP;PT 07/03/22 1300   Exposed Structures JUDITH;None 07/03/22 1300   WOUND NURSE ONLY - Time Spent with Patient (mins) 30 07/03/22 1300       Wound 06/19/22 Full Thickness Wound Knee Left (Active)   Wound Image    07/03/22 1300   Site Assessment Red 07/03/22 1300   Periwound Assessment Clean;Dry;Intact;Scar tissue 07/03/22 1300   Margins Defined edges 07/03/22 1300   Closure Secondary intention 07/03/22 1300   Drainage Amount Small 07/03/22 1300   Drainage Description Serosanguineous 07/03/22 1300   Treatments CSWD - Conservative Sharp Wound Debridement;Cleansed;Irrigation;Site care 07/03/22 1300   Wound Cleansing Approved Wound Cleanser 07/03/22 1300   Periwound Protectant Skin Protectant Wipes to Periwound 07/03/22 1300   Dressing Cleansing/Solutions Not Applicable 07/03/22 1300   Dressing Options Hydrofiber Silver;Silicone Adhesive Foam 07/03/22 1300   Dressing Changed New 07/03/22 1300   Dressing Status Clean;Dry;Intact 07/03/22 1300   Dressing Change/Treatment Frequency Every 48 hrs, and As Needed 07/03/22 1300   NEXT Dressing Change/Treatment Date 07/05/22 07/03/22 1300   NEXT Weekly Photo (Inpatient Only) 07/10/22 07/03/22 1300   Non-staged Wound Description Full thickness 07/03/22 1300   Wound Length (cm) 1.2 cm 07/03/22 1300   Wound Width (cm) 1.2 cm 07/03/22 1300   Wound Depth (cm) 0.3 cm 07/03/22 1300   Wound Surface Area (cm^2) 1.44 cm^2 07/03/22 1300   Wound Volume (cm^3) 0.432 cm^3 07/03/22 1300   Post-Procedure Length (cm) 1.3 cm 07/03/22 1300   Post-Procedure Width (cm) 1.3 cm 07/03/22 1300   Post-Procedure Depth (cm) 0.6  cm 07/03/22 1300   Post-Procedure Surface Area (cm^2) 1.69 cm^2 07/03/22 1300   Post-Procedure Volume (cm^3) 1.014 cm^3 07/03/22 1300   Shape circular 07/03/22 1300   Wound Odor None 07/03/22 1300   Exposed Structures None 07/03/22 1300   WOUND NURSE ONLY - Time Spent with Patient (mins) 30 07/03/22 1300       Wound 07/03/22 Soft Tissue Necrosis Foot Dorsal;Lateral Right (Active)   Wound Image    07/03/22 1300   Site Assessment Pink;Red 07/03/22 1300   Periwound Assessment Clean;Dry;Intact;Scar tissue 07/03/22 1300   Margins Defined edges 07/03/22 1300   Closure Secondary intention 07/03/22 1300   Drainage Amount Small 07/03/22 1300   Drainage Description Serosanguineous 07/03/22 1300   Treatments CSWD - Conservative Sharp Wound Debridement;Cleansed;Irrigation;Site care 07/03/22 1300   Wound Cleansing Approved Wound Cleanser 07/03/22 1300   Periwound Protectant Skin Protectant Wipes to Periwound 07/03/22 1300   Dressing Cleansing/Solutions Not Applicable 07/03/22 1300   Dressing Options Hydrofiber Silver;Silicone Adhesive Foam;Dry Roll Gauze;Tubigrip 07/03/22 1300   Dressing Changed New 07/03/22 1300   Dressing Status Clean;Dry;Intact 07/03/22 1300   Dressing Change/Treatment Frequency Every 48 hrs, and As Needed 07/03/22 1300   NEXT Dressing Change/Treatment Date 07/05/22 07/03/22 1300   NEXT Weekly Photo (Inpatient Only) 07/10/22 07/03/22 1300   Shape irregular 07/03/22 1300   Wound Odor None 07/03/22 1300   Pulses Right;1+;DP;PT 07/03/22 1300   Exposed Structures JUDITH;None 07/03/22 1300   WOUND NURSE ONLY - Time Spent with Patient (mins) 15 07/03/22 1300       Wound 07/03/22 Soft Tissue Necrosis Leg Posterior;Medial Right (Active)   Wound Image   07/03/22 1300   Site Assessment Red;Pink;Black;Slough 07/03/22 1300   Periwound Assessment Clean;Dry;Intact 07/03/22 1300   Margins Defined edges 07/03/22 1300   Closure Secondary intention 07/03/22 1300   Drainage Amount Small 07/03/22 1300   Drainage Description  Serosanguineous 07/03/22 1300   Treatments CSWD - Conservative Sharp Wound Debridement;Cleansed;Irrigation;Site care;Compression 07/03/22 1300   Wound Cleansing Approved Wound Cleanser 07/03/22 1300   Periwound Protectant Skin Protectant Wipes to Periwound 07/03/22 1300   Dressing Cleansing/Solutions Not Applicable 07/03/22 1300   Dressing Options Hydrofiber Silver;Absorbent Abdominal Pad;Dry Roll Gauze;Tubigrip 07/03/22 1300   Dressing Changed New 07/03/22 1300   Dressing Status Clean;Dry;Intact 07/03/22 1300   Dressing Change/Treatment Frequency Every 48 hrs, and As Needed 07/03/22 1300   NEXT Dressing Change/Treatment Date 07/05/22 07/03/22 1300   NEXT Weekly Photo (Inpatient Only) 07/10/22 07/03/22 1300   Non-staged Wound Description Full thickness 07/03/22 1300   Shape irregular 07/03/22 1300   Wound Odor None 07/03/22 1300   Pulses Right;1+;DP;PT 07/03/22 1300   Exposed Structures JUDITH;None 07/03/22 1300   WOUND NURSE ONLY - Time Spent with Patient (mins) 30 07/03/22 1300       Vascular:    MIRELLA:   No results found.    Lab Values:    Lab Results   Component Value Date/Time    WBC 9.1 07/03/2022 06:07 AM    RBC 3.44 (L) 07/03/2022 06:07 AM    HEMOGLOBIN 9.3 (L) 07/03/2022 06:07 AM    HEMATOCRIT 30.4 (L) 07/03/2022 06:07 AM    CREACTPROT 4.82 (H) 06/03/2022 09:42 AM    SEDRATEWES <1 08/19/2020 07:16 AM    HBA1C 10.7 (H) 05/24/2022 03:25 PM        Culture Results show:  No results found for this or any previous visit (from the past 720 hour(s)).    Pain Level/Medicated:  Medicated by bedside RN       INTERVENTIONS BY WOUND TEAM:  Chart and images reviewed. Discussed with bedside RN. All areas of concern (based on picture review, LDA review and discussion with bedside RN) have been thoroughly assessed. Documentation of areas based on significant findings. This RN in to assess patient. Performed standard wound care which includes appropriate positioning, dressing removal and non-selective debridement. Pictures and  measurements obtained weekly if/when required.    LEFT KNEE, RIGHT FOOT DORSAL LATERAL, RIGHT LE ANT,MED, AND POST  Preparation for Dressing removal: Dressing soaked with wound cleanser  Non-selectively Debrided with:  wound cleanser and gauze.  Sharp debridement: CSWD with curette to remove <20.0cm2 of non-viable slough from total wound beds.    Dawn wound: Cleansed with wound cleanser and gauze, Prepped with no sting skin prep  Primary Dressing: Aquacel Ag hydrofiber   Secondary (Outer) Dressing: silicone adhesive foam to knee and foot.  ABD and kerlix with tubigrib size E to right LE.     Interdisciplinary consultation: Patient, Bedside RN (Shaggy)    EVALUATION / RATIONALE FOR TREATMENT:  Most Recent Date:  7/3/22: wound beds has improved since last admit, discontinued dakin's as there is minimal necrotic tissue within the wound bed at this time.  Moist pink and some granulation tissue developing.  Aquacel Ag Hydrofiber applied to manage bioburden, absorb exudate, and maintain a moist wound environment without laterally wicking exudate therefore reducing dawn-wound maceration. tubigrib for some compression.        Goals: Steady decrease in wound area and depth weekly.    WOUND TEAM PLAN OF CARE ([X] for frequency of wound follow up,):   Nursing to follow dressing orders written for wound care. Contact wound team if area fails to progress, deteriorates or with any questions/concerns if something comes up before next scheduled follow up (See below as to whether wound is following and frequency of wound follow up)  Dressing changes by wound team:                   Follow up 3 times weekly:                NPWT change 3 times weekly:     Follow up 1-2 times weekly:    X, weekly with debridement  Follow up Bi-Monthly:                   Follow up as needed:     Other (explain):     NURSING PLAN OF CARE ORDERS (X):  Dressing changes: See Dressing Care orders: x  Skin care: See Skin Care orders: x  RN Prevention Protocol:  x  Rectal tube care: See Rectal Tube Care orders:   Other orders:    RSKIN:   CURRENTLY IN PLACE (X), APPLIED THIS VISIT (A), ORDERED (O):   Q shift Javad:  X  Q shift pressure point assessments:  X    Surface/Positioning   Pressure redistribution mattress            Low Airloss       ICU ORTEGA   Bariatric foam      Bariatric ORTEGA     Waffle cushion        Waffle Overlay          Reposition q 2 hours      TAPs Turning system     Z Kaleb Pillow     Offloading/Redistribution   Sacral Mepilex (Silicone dressing)     Heel Mepilex (Silicone dressing)         Heel float boots (Prevalon boot)             Float Heels off Bed with Pillows   x        Respiratory   Silicone O2 tubing    x     Gray Foam Ear protectors  x   Cannula fixation Device (Tender )          High flow offloading Clip    Elastic head band offloading device      Anchorfast                                                         Trach with Optifoam split foam             Containment/Moisture Prevention NA    Rectal tube or BMS    Purwick/Condom Cath        Epps Catheter    Barrier wipes           Barrier paste       Antifungal tx      Interdry        Mobilization NA      Up to chair        Ambulate      PT/OT      Nutrition       Dietician        Diabetes Education      PO  x   TF     TPN     NPO   # days     Other        Anticipated discharge plans:   LTACH:        SNF/Rehab:         X, patient likely going back to [place]         Home Health Care:           Outpatient Wound Center:            Self/Family Care:        Other:                  Vac Discharge Needs: NA  Not Applicable Pt not on a wound vac:    x   Regular Vac while inpatient, alternative dressing at DC:        Regular Vac in use and continued at DC:            Reg. Vac w/ Skin Sub/Biologic in use. Will need to be changed 2x wkly:      Veraflo Vac while inpatient, ok to transition to Regular Vac on Discharge:           Veraflo Vac while inpatient, will need to remain on Veraflo Vac upon  discharge:

## 2022-07-03 NOTE — DISCHARGE PLANNING
H[facility] TCN chart review completed. Collaborated with DWAINE Galvin prior to meeting with the pt. The most current review of medical record, knowledge of pt's PLOF and social support, LACE+ score of 76, 6 clicks scores of 16 mobility were considered.      Pt seen at bedside. Re-introduced TCN program. Pt verbalizes good recall of TCN education provided at last admission. Pt readmitted to [facility] from [facility] and states he is ok with returning to [facility] when medically cleared for discharge. Choice proactively obtained for SNF ([facility]), faxed to [facility]. CM notified. TCN will continue to follow and collaborate with discharge planning team as additional post acute needs arise. Thank you.

## 2022-07-03 NOTE — PROGRESS NOTES
Hospital Medicine Daily Progress Note    Date of Service  7/3/2022    Chief Complaint  Jama Altman is a 66 y.o. male admitted 6/30/2022 with fever and dysuria.    Hospital Course  Mr. Altman has a past medical history of polycystic kidney disease status post renal transplantation 2010 on chronic immunosuppressant therapy followed by Dr. León nephrology was most recently admitted to this hospital from 5/24/2022 through 6/22/2022 due to septic shock with blood cultures positive for E. coli bacteremia.  During that admission he had PEA arrest and required intubation.  He had repeat cultures that were negative and was treated with IV antibiotics. He was found to have a right shin nonhealing ulcerations and underwent an angiogram by Dr. Lopez vascular surgery which revealed an occlusion of the anterior tibial artery though good reperfusion distally.  During the hospitalization he went into renal failure requiring transient dialysis though his renal function improved with a creatinine of 1.4 on discharge.  He was found to have new onset atrial fibrillation and initiated on Eliquis therapy for anticoagulation.  He was discharged to [facility] nursing [facility] for wound care.  He developed dysuria and frequency and a fever of 103 therefore was brought to the emergency room on 6/30/2022 where he was found to have septic shock and white count of 15,000 and acute kidney injury with creatinine of 2 and was admitted to intensive care unit requiring intravenous pressors and IV fluids.  Urine and blood cultures are positive again for E. Coli.       Interval Problem Update  7/2/2022: Patient seen and evaluated in the ICU.  He has transfer orders to the IM.  He remains hypotensive.  He is tolerating a diet.  His wounds have been evaluated and we are pending a wound care consultation.  I discussed with Dr. Briggs, infectious disease for consultation given his recurrent E. coli bacteremia.  Tacrolimus level has been  ordered.  7/3: Mr. Altman was evaluated and examined in the IMCU. He remains in afib and tachycardic. His blood pressure has come up to 128/86. The E coli came back ESBL and he is on IV Meropenem. He has a poor appetite and is willing to try supplements such as Boost.      I have discussed this patient's plan of care and discharge plan at IDT rounds today with Case Management, Nursing, Nursing leadership, and other members of the IDT team.    Consultants/Specialty  critical care  Infectious disease   Code Status  Full Code    Disposition  Patient is not medically cleared for discharge.   Anticipate discharge to to skilled nursing facility.  I have placed the appropriate orders for post-discharge needs.    Review of Systems  Review of Systems   Constitutional: Negative for chills and fever.        Generally weak   Respiratory: Negative for cough and shortness of breath.    Cardiovascular: Negative for chest pain.   Gastrointestinal: Negative for diarrhea, nausea and vomiting.        Poor appetite    All other systems reviewed and are negative.       Physical Exam  Temp:  [36.1 °C (97 °F)-37.2 °C (98.9 °F)] 36.4 °C (97.6 °F)  Pulse:  [64-98] 90  Resp:  [15-29] 16  BP: ()/(51-98) 157/81  SpO2:  [88 %-98 %] 97 %    Physical Exam  Vitals and nursing note reviewed.   Constitutional:       General: He is not in acute distress.     Appearance: He is not toxic-appearing.   HENT:      Head: Normocephalic and atraumatic.      Mouth/Throat:      Mouth: Mucous membranes are moist.      Pharynx: Oropharynx is clear.   Eyes:      General: No scleral icterus.     Conjunctiva/sclera: Conjunctivae normal.   Neck:      Comments: Right IJ central line  Cardiovascular:      Rate and Rhythm: Tachycardia present. Rhythm irregular.      Heart sounds: No murmur heard.  Pulmonary:      Effort: Pulmonary effort is normal.      Breath sounds: Normal breath sounds.   Abdominal:      General: There is no distension.      Tenderness: There is  no abdominal tenderness.   Musculoskeletal:      Cervical back: Normal range of motion and neck supple.      Comments: Right foot dorsum scab  Right shin 3 large ulcers with granulation tissue.   Skin:     General: Skin is warm and dry.   Neurological:      General: No focal deficit present.      Mental Status: He is alert and oriented to person, place, and time.   Psychiatric:         Mood and Affect: Mood normal.         Behavior: Behavior normal.         Thought Content: Thought content normal.         Judgment: Judgment normal.         Fluids    Intake/Output Summary (Last 24 hours) at 7/3/2022 0711  Last data filed at 7/3/2022 0600  Gross per 24 hour   Intake 516.67 ml   Output 2850 ml   Net -2333.33 ml       Laboratory  Recent Labs     07/01/22  1242 07/02/22  0440 07/03/22  0607   WBC 19.8* 11.2* 9.1   RBC 3.17* 3.01* 3.44*   HEMOGLOBIN 8.7* 8.1* 9.3*   HEMATOCRIT 28.1* 26.5* 30.4*   MCV 88.6 88.0 88.4   MCH 27.4 26.9* 27.0   MCHC 31.0* 30.6* 30.6*   RDW 48.8 49.5 48.4   PLATELETCT 218 176 178   MPV 10.4 10.8 10.5     Recent Labs     07/01/22  1242 07/02/22  0440 07/03/22  0607   SODIUM 131* 130* 130*   POTASSIUM 5.0 5.1 4.5   CHLORIDE 103 100 100   CO2 20 21 20   GLUCOSE 143* 156* 196*   BUN 22 23* 20   CREATININE 1.71* 1.57* 1.27   CALCIUM 7.4* 7.8* 8.0*     Recent Labs     06/30/22  2245 07/01/22  0512   APTT 37.3*  --    INR 1.61* 1.65*               Imaging  EC-ECHOCARDIOGRAM COMPLETE W/ CONT   Final Result      DX-CHEST-FOR LINE PLACEMENT Perform procedure in: Patient's Room   Final Result         1.  Interstitial edema and/or infiltrate.   2.  Atherosclerosis      CT-RENAL COLIC EVALUATION(A/P W/O)   Final Result         1.  Changes compatible with polycystic kidney disease.   2.  Diverticulosis   3.  Fat-containing left inguinal hernia      DX-CHEST-PORTABLE (1 VIEW)   Final Result         1.  Patchy bilateral pulmonary infiltrates or edema.   2.  Atherosclerosis           Assessment/Plan  * Septic  shock (HCC)- (present on admission)  Assessment & Plan  This is Septic shock Present on admission  SIRS criteria identified on my evaluation include: Tachycardia, with heart rate greater than 90 BPM  Indicators of septic shock include: Severe sepsis present and persistent hypotension after 30 ml/kg completed.   Sources is: UTI with bacteremia   Sepsis protocol initiated  Crystalloid Fluid Administration: was given  IV antibiotics as appropriate for source of sepsis  Reassessment: I have reassessed the patient's hemodynamic status  He has required IV pressors.  Midodrine has been started 5 mg TID and will be tapered as his blood pressure improves.   If his blood pressure drops, consider stress-dose steroids given his chronic prednisone use.      Bacteremia due to Escherichia coli- (present on admission)  Assessment & Plan  Blood and urine cultures are positive for E coli.   Sensitivities reveal ESBL while he had nearly pan-sensitive E coli bacteremia 5/24 with subsequent negative cultures.   Dr. Briggs infectious disease has consulted  IV Meropenem.     Non-healing wound of right lower extremity- (present on admission)  Assessment & Plan  Extensive ulcers of the right shin  Wound care consult  He has an angiogram last admission by Dr. Lopez which revealed occlusion of the anterior tibial artery with good perfusion of the foot.     Acute-on-chronic kidney injury (HCC)- (present on admission)  Assessment & Plan  Hx of transplant  On last admission he required dialysis   Cr 2 on admission likely secondary to septic shock and has come down to 1.27.    Urinary tract infection- (present on admission)  Assessment & Plan  E coli with bacteremia     PAF (paroxysmal atrial fibrillation) ([facility])- (present on admission)  Assessment & Plan  Continue Eliquis for anticoagulation  He was not on medications for rate control as an outpatient  If he remains tachycardic and his blood pressure can tolerate it, he may be a candidate to  start metoprolol      Type 2 diabetes mellitus with kidney complication, without long-term current use of insulin (HCC)- (present on admission)  Assessment & Plan  He had been on Glyburide, Linagliptin, and Januvia as an outpatient   HbA1c was 10 a month ago  Sliding scale insulin for now and consider long-acting insulin as his diet improves    CAD (coronary artery disease)- (present on admission)  Assessment & Plan  Hx of stent  Echo reveals an EF 45% thus he is at risk of volume overload     History of renal transplant- (present on admission)  Assessment & Plan  At [facility] 2010.  He is followed locally by Dr. León  Continue Prograf, Prednisone, and CellCept.   Tacrolimus level ordered.        VTE prophylaxis: therapeutic anticoagulation with Eliquis    I have performed a physical exam and reviewed and updated ROS and Plan today (7/3/2022). In review of yesterday's note (7/2/2022), there are no changes except as documented above.

## 2022-07-03 NOTE — DISCHARGE PLANNING
Received Choice form at 0979  Agency/Facility Name: [facility]   Referral sent per Choice form @ 0235

## 2022-07-03 NOTE — CARE PLAN
The patient is Watcher - Medium risk of patient condition declining or worsening    Shift Goals  Clinical Goals: hemodynamic stability, ambulation, wound care  Patient Goals: rest, improve  Family Goals: improvement    Progress made toward(s) clinical / shift goals:    Problem: Knowledge Deficit - Standard  Goal: Patient and family/care givers will demonstrate understanding of plan of care, disease process/condition, diagnostic tests and medications  Outcome: Progressing     Problem: Fluid Volume  Goal: Fluid volume balance will be maintained  Outcome: Progressing     Problem: Respiratory  Goal: Patient will achieve/maintain optimum respiratory ventilation and gas exchange  Outcome: Progressing       Patient is not progressing towards the following goals:

## 2022-07-03 NOTE — PROGRESS NOTES
Infectious Disease Progress Note    Author: Delmi Romero M.D. Date & Time of service: 7/3/2022  12:03 PM    Chief Complaint:  Ecoli UTI and bacteremia    Interval History:      Review of Systems:  Review of Systems   Respiratory: Negative for cough, sputum production and shortness of breath.    Gastrointestinal: Negative for abdominal pain, constipation, diarrhea, nausea and vomiting.   Genitourinary: Negative for dysuria, flank pain and hematuria.   Musculoskeletal: Negative for myalgias.   Psychiatric/Behavioral: The patient is not nervous/anxious.        Hemodynamics:  Temp (24hrs), Av.5 °C (97.7 °F), Min:36.1 °C (97 °F), Max:36.9 °C (98.4 °F)  Temperature: 36.8 °C (98.2 °F)  Pulse  Av.3  Min: 57  Max: 134   Blood Pressure : (!) 86/62       Physical Exam:  Physical Exam  Cardiovascular:      Rate and Rhythm: Normal rate and regular rhythm.      Heart sounds: Normal heart sounds.   Pulmonary:      Effort: Pulmonary effort is normal.      Breath sounds: Normal breath sounds.   Abdominal:      General: Abdomen is flat. There is no distension.      Palpations: Abdomen is soft.      Tenderness: There is no abdominal tenderness. There is no guarding.   Musculoskeletal:         General: Normal range of motion.   Skin:     General: Skin is warm and dry.   Neurological:      General: No focal deficit present.      Mental Status: He is oriented to person, place, and time.   Psychiatric:         Mood and Affect: Mood normal.         Behavior: Behavior normal.         Meds:    Current Facility-Administered Medications:   •  meropenem  •  norepinephrine (Levophed) infusion  •  senna-docusate **AND** polyethylene glycol/lytes **AND** magnesium hydroxide **AND** bisacodyl  •  Respiratory Therapy Consult  •  acetaminophen  •  apixaban  •  atorvastatin  •  insulin regular **AND** POC blood glucose manual result **AND** NOTIFY MD and PharmD **AND** Administer 20 grams of glucose (approximately 8 ounces of fruit  juice) every 15 minutes PRN FSBG less than 70 mg/dL **AND** dextrose bolus  •  midodrine  •  dakins 0.125% (1/4 strength)  •  mycophenolate  •  predniSONE  •  tacrolimus  •  guaiFENesin    Labs:  Recent Labs     06/30/22 2245 07/01/22 1242 07/02/22 0440 07/03/22  0607   WBC 14.7* 19.8* 11.2* 9.1   RBC 3.73* 3.17* 3.01* 3.44*   HEMOGLOBIN 10.1* 8.7* 8.1* 9.3*   HEMATOCRIT 32.8* 28.1* 26.5* 30.4*   MCV 87.9 88.6 88.0 88.4   MCH 27.1 27.4 26.9* 27.0   RDW 48.9 48.8 49.5 48.4   PLATELETCT 247 218 176 178   MPV 10.5 10.4 10.8 10.5   NEUTSPOLYS 88.70*  --   --   --    LYMPHOCYTES 2.90*  --   --   --    MONOCYTES 6.30  --   --   --    EOSINOPHILS 0.10  --   --   --    BASOPHILS 0.30  --   --   --      Recent Labs     07/01/22 1242 07/02/22 0440 07/03/22  0607   SODIUM 131* 130* 130*   POTASSIUM 5.0 5.1 4.5   CHLORIDE 103 100 100   CO2 20 21 20   GLUCOSE 143* 156* 196*   BUN 22 23* 20     Recent Labs     06/30/22 2245 07/01/22 1242 07/02/22 0440 07/03/22  0607   ALBUMIN 2.9*  --   --   --    TBILIRUBIN 0.4  --   --   --    ALKPHOSPHAT 106*  --   --   --    TOTPROTEIN 5.9*  --   --   --    ALTSGPT 16  --   --   --    ASTSGOT 14  --   --   --    CREATININE 1.98* 1.71* 1.57* 1.27       Imaging:  CT-HEAD W/O    Result Date: 6/8/2022 6/8/2022 11:01 AM HISTORY/REASON FOR EXAM:  Mental status change, unknown cause. TECHNIQUE/EXAM DESCRIPTION AND NUMBER OF VIEWS: CT of the head without contrast. The study was performed on a helical multidetector CT scanner. Contiguous axial sections were obtained from the skull base through the vertex. Up to date radiation dose reduction adjustments have been utilized to meet ALARA standards for radiation dose reduction. COMPARISON:  CT head, 5/24/2022 at 1140 hours FINDINGS: The calvariae and skull base are unremarkable. There are no extraaxial fluid collections. There is a pattern of cerebral atrophy manifest as enlargement of sulcal markings and ventricular prominence. The ventricular  system and basal cisterns are otherwise unremarkable. There are no areas of abnormal density in the brain substance. There are no hemorrhagic lesions. There are no mass effects or shift of midline structures. The brainstem and posterior fossa structures are unremarkable. Paranasal sinuses in the field of view are unremarkable. Bilateral mastoid effusions. Carotid arteriosclerosis.     1.  Cerebral atrophy. 2.  Bilateral mastoid effusions. 3.  Otherwise, Head CT without contrast within normal limits. No evidence of acute intracranial hemorrhage or mass lesion.     CT-RENAL COLIC EVALUATION(A/P W/O)    Result Date: 6/30/2022 6/30/2022 11:05 PM HISTORY/REASON FOR EXAM:  Flank pain, kidney stone suspected; NAUSEA/VOMITING; PAINFUL URINATION. TECHNIQUE/EXAM DESCRIPTION: CT scan of the abdomen and pelvis without contrast. Noncontrast helical scanning was obtained from the diaphragmatic domes through the pubic symphysis. Low dose optimization technique was utilized for this CT exam including automated exposure control and adjustment of the mA and/or kV according to patient size. COMPARISON: May 27, 2022 FINDINGS: Lower Chest: Linear densities the bilateral lung bases favor changes of atelectasis. Liver: Normal. Spleen: Unremarkable. Pancreas: Unremarkable. Gallbladder: No calcified stones. Biliary: Nondilated. Adrenal glands: Normal. Kidneys: Right pelvic kidney is seen, appearance suggests transplant kidney. The right kidney is otherwise not visualized and is likely surgically absent. There is enlargement of the left kidney with innumerable cysts, appearance most compatible with polycystic kidney disease. No hydronephrosis of the transplant kidney or renal calculi are appreciated. Bowel: No obstruction or acute inflammation. Scattered colonic diverticula are seen. Lymph nodes: No adenopathy. Vasculature: Unremarkable. Peritoneum: Unremarkable without ascites. Musculoskeletal: No acute or destructive process. Pelvis: No  adenopathy or free fluid. Fat-containing left inguinal hernia is seen.     1.  Changes compatible with polycystic kidney disease. 2.  Diverticulosis 3.  Fat-containing left inguinal hernia    DX-CHEST-FOR LINE PLACEMENT Perform procedure in: Patient's Room    Result Date: 7/1/2022 7/1/2022 5:03 AM HISTORY/REASON FOR EXAM: Central line placement TECHNIQUE/EXAM DESCRIPTION:  PA and lateral views of the chest. COMPARISON: None FINDINGS: Right internal jugular central line is seen terminating at the right atriocaval junction.   The cardiac silhouette appears within normal limits. Atherosclerotic calcification of the aorta is noted.  The central pulmonary vasculature appears normal. Bilateral lung volumes are diminished.  Hazy interstitial bilateral pulmonary opacities are seen. No significant pleural effusions are identified. The bony structures appear age-appropriate.     1.  Interstitial edema and/or infiltrate. 2.  Atherosclerosis    DX-CHEST-PORTABLE (1 VIEW)    Result Date: 6/30/2022 6/30/2022 10:44 PM HISTORY/REASON FOR EXAM: Possible sepsis. TECHNIQUE/EXAM DESCRIPTION:  Single AP view of the chest. COMPARISON: June 14, 2022 FINDINGS: The cardiac silhouette appears within normal limits. Atherosclerotic calcification of the aorta is noted.  The central pulmonary vasculature appears normal. The lungs appear well expanded bilaterally.  Patchy bilateral pulmonary opacities are seen. No significant pleural effusions are identified. The bony structures appear age-appropriate.     1.  Patchy bilateral pulmonary infiltrates or edema. 2.  Atherosclerosis    DX-CHEST-PORTABLE (1 VIEW)    Result Date: 6/14/2022 6/14/2022 8:09 AM HISTORY/REASON FOR EXAM:  Shortness of Breath. TECHNIQUE/EXAM DESCRIPTION AND NUMBER OF VIEWS: Single portable view of the chest. COMPARISON:  6/2/2022 FINDINGS: LUNGS: Interval improvement in bilateral pulmonary opacities, likely improving edema. Bilateral infrahilar opacities persist. No  effusions. PNEUMOTHORAX: None. LINES AND TUBES: None. MEDIASTINUM: Stable mild cardiomegaly. Atherosclerosis. MUSCULOSKELETAL STRUCTURES: No acute displaced fracture.     1.  Interval improvement in bilateral pulmonary opacities, likely improving edema. 2.  Bilateral infrahilar atelectasis versus consolidations persist. No effusions.     EC-ECHOCARDIOGRAM COMPLETE W/ CONT    Result Date: 2022  Transthoracic Echo Report Echocardiography Laboratory CONCLUSIONS Compared to the prior echo on 2022. Definite vegetations are not identified, failure to do so does not exclude their presence or the diagnosis of endocarditis, if clinically indicated, a transesophageal study would be useful. Mildly reduced left ventricular systolic function. The left ventricular ejection fraction is visually estimated to be 45%. Normal right ventricular size and systolic function. Aortic valve sclerosis without significant stenosis. No pericardial effusion. CIARA FORRESTER Exam Date:         2022                    07:25 Exam Location:     Inpatient Priority:          Routine Ordering Physician:        LES YMERS Referring Physician: Sonographer:               Meeta Suarez SARAI Age:    66     Gender:    M MRN:    [id number] :    1955 BSA:    2.32   Ht (in):    77     Wt (lb):    217 Exam Type:     Complete, Contrast Indications:     Endocarditis ICD Codes:       421 CPT Codes:       68787,  BP:   110    /   57     HR: Technical Quality:       Good MEASUREMENTS  (Male / Female) Normal Values 2D ECHO LV Diastolic Diameter PLAX        5.6 cm                4.2 - 5.9 / 3.9 - 5.3 cm LV Systolic Diameter PLAX         4.7 cm                2.1 - 4.0 cm IVS Diastolic Thickness           0.86 cm               LVPW Diastolic Thickness          0.86 cm               LVOT Diameter                     1.9 cm                Estimated LV Ejection Fraction    45 %                  LV Ejection Fraction MOD BP       36.4 %                 >= 55  % LV Ejection Fraction MOD 4C       49.7 %                LV Ejection Fraction MOD 2C       33.2 %                LV Ejection Fraction 4C AL        50.1 %                LV Ejection Fraction 2C AL        37.4 %                LA Volume Index                   34.9 cm3/m2           16 - 28 cm3/m2 DOPPLER AV Peak Velocity                  1.5 m/s               AV Peak Gradient                  9.5 mmHg              AV Mean Gradient                  4 mmHg                LVOT Peak Velocity                1.1 m/s               AV Area Cont Eq vti               2.5 cm2               Mitral E Point Velocity           0.56 m/s              Mitral E to A Ratio               0.85                  MV Pressure Half Time             107 ms                MV Area PHT                       2.1 cm2               MV Deceleration Time              366 ms                TR Peak Velocity                  252 cm/s              PV Peak Velocity                  1 m/s                 PV Peak Gradient                  4.3 mmHg              RVOT Peak Velocity                0.79 m/s              LV E' Lateral Velocity            9.4 cm/s              Mitral E to LV E' Lateral Ratio   6                     LV E' Septal Velocity             6.7 cm/s              Mitral E to LV E' Septal Ratio    8.3                   * Indicates values subject to auto-interpretation LV EF:  45    % FINDINGS Left Ventricle Contrast was used to enhance visualization of the endocardial border. 3 mL of contrast was administered. Existing IV was used at the right arm. Normal left ventricular chamber size. Normal left ventricular wall thickness. Mildly reduced left ventricular systolic function. The left ventricular ejection fraction is visually estimated to be 45%. Global hypokinesis with regional variation. Grade I diastolic dysfunction. Right Ventricle Normal right ventricular size and systolic function. Right Atrium Normal right atrial size. The  "inferior vena cava is not well visualized. Left Atrium Normal left atrial size. Left atrial volume index is 31 mL/sq m. Mitral Valve Structurally normal mitral valve without significant stenosis. Trace mitral regurgitation. Aortic Valve Tricuspid aortic valve. Aortic valve sclerosis without significant stenosis. No aortic insufficiency. Tricuspid Valve Structurally normal tricuspid valve without significant stenosis. Trace tricuspid regurgitation. Right ventricular systolic pressure is estimated to be 28 mmHg. Pulmonic Valve Structurally normal pulmonic valve without significant stenosis. Trace pulmonic insufficiency. Pericardium No pericardial effusion. Aorta Normal aortic root for body surface area. The ascending aorta diameter is 3.3 cm. Mikel Rivera MD (Electronically Signed) Final Date:     02 July 2022                 14:31      Micro:  Results     Procedure Component Value Units Date/Time    BLOOD CULTURE [[id number]]  (Abnormal) Collected: 06/30/22 2328    Order Status: Completed Specimen: Blood from Peripheral Updated: 07/03/22 1029     Significant Indicator POS     Source BLD     Site PERIPHERAL     Culture Result Growth detected by Bactec instrument. 07/01/2022  13:20      Escherichia coli  See previous culture for sensitivity report.      Narrative:      CALL  [facility]  161 tel. [phone],  CALLED  161 tel. [phone] 07/01/2022, 13:22, RB PERF. RESULTS CALLED TO:  [id number] RN  Per Hospital Policy: Only change Specimen Src: to \"Line\" if  specified by physician order.  Right Hand    BLOOD CULTURE [[id number]]  (Abnormal)  (Susceptibility) Collected: 06/30/22 2245    Order Status: Completed Specimen: Blood from Peripheral Updated: 07/03/22 1029     Significant Indicator POS     Source BLD     Site PERIPHERAL     Culture Result Growth detected by Bactec instrument. 07/01/2022  13:35      Escherichia coli ESBL  Extended Spectrum Beta-lactamase (ESBL) isolated.  ESBL's may be clinically resistant to therapy " "with  Penicillins,Cephalosporins or Aztreonam despite  apparent in vitro susceptibility to some of these agents.  The patient requires contact isolation.  Please contact pharmacy or an Infectious Disease Specialist  if you have any questions about appropriate therapy.      Narrative:      CALL  [facility]  161 tel. [phone],  CALLED  161 tel. [phone] 07/01/2022, 13:36, RB PERF. RESULTS CALLED TO:  [id number]  Per Hospital Policy: Only change Specimen Src: to \"Line\" if  specified by physician order.  Right AC    Susceptibility     Escherichia coli esbl (1)     Antibiotic Interpretation Microscan   Method Status    Ampicillin Resistant >16 mcg/mL CESIA Final    Ceftriaxone Resistant >32 mcg/mL CESIA Final    Cefazolin Resistant >16 mcg/mL CESIA Final    Ciprofloxacin Sensitive <=0.25 mcg/mL CESIA Final    Cefepime Resistant >16 mcg/mL CESIA Final    Cefuroxime Resistant >16 mcg/mL CESIA Final    Ampicillin/sulbactam Sensitive 8/4 mcg/mL CESIA Final    Ertapenem Sensitive <=0.5 mcg/mL CESIA Final    Tobramycin Sensitive <=2 mcg/mL CESIA Final    Gentamicin Sensitive <=2 mcg/mL CESIA Final    Minocycline Sensitive <=4 mcg/mL CESIA Final    Moxifloxacin Sensitive <=2 mcg/mL CESIA Final    Pip/Tazobactam Sensitive <=8 mcg/mL CESIA Final    Trimeth/Sulfa Resistant >2/38 mcg/mL CESIA Final    Tigecycline Sensitive <=2 mcg/mL CESIA Final                   URINE CULTURE(NEW) [[id number]]  (Abnormal)  (Susceptibility) Collected: 07/01/22 0036    Order Status: Completed Specimen: Urine Updated: 07/03/22 0839     Significant Indicator POS     Source UR     Site -     Culture Result Usual skin shaun 10-50,000 cfu/mL      Escherichia coli ESBL  >100,000 cfu/mL  Extended Spectrum Beta-lactamase (ESBL) isolated.  ESBL's may be clinically resistant to therapy with  Penicillins,Cephalosporins or Aztreonam despite  apparent in vitro susceptibility to some of these agents.  The patient requires contact isolation.  Please contact pharmacy or an Infectious Disease " "Specialist  if you have any questions about appropriate therapy.      Narrative:      Indication for culture:->Evaluation for sepsis without a  clear source of infection  Indication for culture:->Evaluation for sepsis without a    Susceptibility     Escherichia coli esbl (1)     Antibiotic Interpretation Microscan   Method Status    Ampicillin Resistant >16 mcg/mL CESIA Final    Ceftriaxone Resistant >32 mcg/mL CESIA Final    Cefazolin Resistant >16 mcg/mL CESIA Final     Breakpoints when Cefazolin is used for therapy of infections  other than uncomplicated UTIs due to Enterobacterales are as  follows:  CESIA and Interpretation:  <=2 S  4 I  >=8 R         Ciprofloxacin Sensitive <=0.25 mcg/mL CESIA Final    Cefepime Resistant >16 mcg/mL CESIA Final    Cefuroxime Resistant >16 mcg/mL CESIA Final    Ampicillin/sulbactam Intermediate 16/8 mcg/mL CESIA Final    Tobramycin Sensitive 4 mcg/mL CESIA Final    Nitrofurantoin Sensitive <=32 mcg/mL CESIA Final    Gentamicin Sensitive <=2 mcg/mL CESIA Final    Levofloxacin Sensitive <=0.5 mcg/mL CESIA Final    Minocycline Sensitive <=4 mcg/mL CESIA Final    Pip/Tazobactam Sensitive <=8 mcg/mL CESIA Final    Trimeth/Sulfa Resistant >2/38 mcg/mL CESIA Final    Tigecycline Sensitive <=2 mcg/mL CESIA Final                   CoV-2 and Flu A/B by PCR (24 hour In-House): Collect NP swab in [facility] [[id number]] Collected: 07/01/22 0206    Order Status: Completed Specimen: Respirate from Nasopharyngeal Updated: 07/01/22 1705     Influenza virus A RNA Negative     Influenza virus B, PCR Negative     SARS-CoV-2 by PCR NotDetected     Comment: PATIENTS: Important information regarding your results and instructions can  be found at [email or web address]   \"After your  Covid-19 Test\"    [facility] providers: PLEASE REFER TO DE-ESCALATION AND RETESTING PROTOCOL  on [email or web address]    **The Roche SARS-CoV-2 RT-PCR test has been made available for use under the  Emergency Use Authorization (EUA) only.      "     SARS-CoV-2 Source NP Swab    URINALYSIS [[id number]]  (Abnormal) Collected: 07/01/22 0036    Order Status: Completed Specimen: Urine Updated: 07/01/22 0158     Color Yellow     Character Turbid     Specific Gravity 1.014     Ph 5.0     Glucose Negative mg/dL      Ketones Negative mg/dL      Protein 100 mg/dL      Bilirubin Negative     Urobilinogen, Urine 0.2     Nitrite Positive     Leukocyte Esterase Large     Occult Blood Moderate     Micro Urine Req Microscopic    Narrative:      Indication for culture:->Evaluation for sepsis without a  clear source of infection          Assessment:  Active Hospital Problems    Diagnosis    • *Septic shock ([facility]) [A41.9, R65.21]    • Urinary tract infection [N39.0]    • Non-healing wound of right lower extremity [S81.801A]    • Bacteremia due to Escherichia coli [R78.81, B96.20]    • PAF (paroxysmal atrial fibrillation) ([facility]) [I48.0]    • Acute-on-chronic kidney injury ([facility]) [N17.9, N18.9]    • Type 2 diabetes mellitus with kidney complication, without long-term current use of insulin ([facility]) [E11.29]    • CAD (coronary artery disease) [I25.10]    • History of renal transplant [Z94.0]      Interval 24 hours:      AF, O2 RA  Labs reviewed  Imaging personally reviewed both images and report.   Micro reviewed    Patient doing well overall denies any dysuria or flank pain.  Continue abx as below.    Assessment:  66-year-old with history of uncontrolled diabetes mellitus and renal transplant on chronic immunosuppression, history of total right knee arthroplasty and A. fib on Plavix.  Recent admit after ground-level fall with cellulitis and found to have E. coli UTI resulting in septic shock.  On 6/17 he went to the OR for angiograms, vessels found widely patent and no vascular intervention performed.  Now admitted with complaints of dysuria nausea and vomiting. Septic shock and admitted to the ICU.  Blood and urine cultures positive for E. coli.    Septic shock, pressors now weaned  off  Bacteremia, urinary source  -Cultures on 6/30 +ESBL E. Coli  -TTE on 7/2 with no vegetations, no significant valvular disease, EF 45%, grade 1 diastolic dysfunction  UTI  -Urine cultures on 6/30 + ESBL E. Coli  -CT renal notes the polycystic kidney disease on left, transplant kidney on right, no obvious pyelonephritis or abscess  Renal transplant- due to polycystic kidney disease  9/10/2010  Immune compromised  On mycophenolate, prednisone, and tacrolimus  SANKET, improved  DM  Afib  RLE arterial ulcerations; left knee wound. Picture review show significant improvement  History of E. coli bacteremia on 5/24/2022    Plan:    --- Continue meropenem, will recommend a 2-week IV antibiotic course for bacteremia given the immunocompromise of the patient and in the setting of polycystic kidney disease  --- Will repeat blood cultures to ensure clearance as this is the second time the patient has had an E. coli bacteremia - ordered  --- If any further positive blood cultures with E. coli will recommend RADHA  --- Recommended the patient discuss his transplant medications with his transplant team and minimize neuro compromise is much as possible   --- Monitor labs  --- Monitor kidney function, avoid nephrotoxins and renally dose medications      ID will follow.

## 2022-07-04 LAB
GLUCOSE BLD STRIP.AUTO-MCNC: 168 MG/DL (ref 65–99)
TACROLIMUS BLD-MCNC: 5.4 NG/ML

## 2022-07-04 PROCEDURE — 99233 SBSQ HOSP IP/OBS HIGH 50: CPT | Performed by: INTERNAL MEDICINE

## 2022-07-04 PROCEDURE — 700105 HCHG RX REV CODE 258: Performed by: INTERNAL MEDICINE

## 2022-07-04 PROCEDURE — 700111 HCHG RX REV CODE 636 W/ 250 OVERRIDE (IP): Performed by: EMERGENCY MEDICINE

## 2022-07-04 PROCEDURE — 700111 HCHG RX REV CODE 636 W/ 250 OVERRIDE (IP): Performed by: INTERNAL MEDICINE

## 2022-07-04 PROCEDURE — A9270 NON-COVERED ITEM OR SERVICE: HCPCS | Performed by: HOSPITALIST

## 2022-07-04 PROCEDURE — 82962 GLUCOSE BLOOD TEST: CPT

## 2022-07-04 PROCEDURE — A9270 NON-COVERED ITEM OR SERVICE: HCPCS | Performed by: STUDENT IN AN ORGANIZED HEALTH CARE EDUCATION/TRAINING PROGRAM

## 2022-07-04 PROCEDURE — 770001 HCHG ROOM/CARE - MED/SURG/GYN PRIV*

## 2022-07-04 PROCEDURE — 99232 SBSQ HOSP IP/OBS MODERATE 35: CPT | Performed by: HOSPITALIST

## 2022-07-04 PROCEDURE — 700102 HCHG RX REV CODE 250 W/ 637 OVERRIDE(OP): Performed by: STUDENT IN AN ORGANIZED HEALTH CARE EDUCATION/TRAINING PROGRAM

## 2022-07-04 PROCEDURE — 700102 HCHG RX REV CODE 250 W/ 637 OVERRIDE(OP): Performed by: HOSPITALIST

## 2022-07-04 RX ORDER — GLYBURIDE 5 MG/1
10 TABLET ORAL
Status: DISCONTINUED | OUTPATIENT
Start: 2022-07-05 | End: 2022-07-06 | Stop reason: HOSPADM

## 2022-07-04 RX ORDER — ATORVASTATIN CALCIUM 20 MG/1
20 TABLET, FILM COATED ORAL
Status: DISCONTINUED | OUTPATIENT
Start: 2022-07-04 | End: 2022-07-06 | Stop reason: HOSPADM

## 2022-07-04 RX ADMIN — TACROLIMUS 1 MG: 1 CAPSULE ORAL at 17:47

## 2022-07-04 RX ADMIN — APIXABAN 5 MG: 5 TABLET, FILM COATED ORAL at 05:58

## 2022-07-04 RX ADMIN — MEROPENEM 500 MG: 500 INJECTION, POWDER, FOR SOLUTION INTRAVENOUS at 21:53

## 2022-07-04 RX ADMIN — SITAGLIPTIN 50 MG: 50 TABLET, FILM COATED ORAL at 12:05

## 2022-07-04 RX ADMIN — APIXABAN 5 MG: 5 TABLET, FILM COATED ORAL at 17:47

## 2022-07-04 RX ADMIN — SENNOSIDES AND DOCUSATE SODIUM 2 TABLET: 50; 8.6 TABLET ORAL at 17:47

## 2022-07-04 RX ADMIN — ACETAMINOPHEN 650 MG: 325 TABLET, FILM COATED ORAL at 21:59

## 2022-07-04 RX ADMIN — TACROLIMUS 1 MG: 1 CAPSULE ORAL at 05:58

## 2022-07-04 RX ADMIN — INSULIN HUMAN 1 UNITS: 100 INJECTION, SOLUTION PARENTERAL at 09:04

## 2022-07-04 RX ADMIN — MEROPENEM 500 MG: 500 INJECTION, POWDER, FOR SOLUTION INTRAVENOUS at 15:06

## 2022-07-04 RX ADMIN — ATORVASTATIN CALCIUM 20 MG: 20 TABLET, FILM COATED ORAL at 21:00

## 2022-07-04 RX ADMIN — PREDNISONE 5 MG: 10 TABLET ORAL at 05:58

## 2022-07-04 RX ADMIN — MYCOPHENOLATE MOFETIL 500 MG: 250 CAPSULE ORAL at 05:58

## 2022-07-04 RX ADMIN — MIDODRINE HYDROCHLORIDE 5 MG: 5 TABLET ORAL at 05:58

## 2022-07-04 RX ADMIN — MEROPENEM 500 MG: 500 INJECTION, POWDER, FOR SOLUTION INTRAVENOUS at 03:00

## 2022-07-04 RX ADMIN — SENNOSIDES AND DOCUSATE SODIUM 2 TABLET: 50; 8.6 TABLET ORAL at 05:59

## 2022-07-04 RX ADMIN — MEROPENEM 500 MG: 500 INJECTION, POWDER, FOR SOLUTION INTRAVENOUS at 09:04

## 2022-07-04 RX ADMIN — MYCOPHENOLATE MOFETIL 500 MG: 250 CAPSULE ORAL at 17:47

## 2022-07-04 ASSESSMENT — PAIN DESCRIPTION - PAIN TYPE
TYPE: CHRONIC PAIN
TYPE: ACUTE PAIN
TYPE: CHRONIC PAIN
TYPE: ACUTE PAIN
TYPE: CHRONIC PAIN

## 2022-07-04 ASSESSMENT — ENCOUNTER SYMPTOMS
SHORTNESS OF BREATH: 0
DIARRHEA: 0
CHILLS: 0
SPUTUM PRODUCTION: 0
ABDOMINAL PAIN: 1
NERVOUS/ANXIOUS: 0
FEVER: 0
COUGH: 0
NAUSEA: 0
VOMITING: 0
MYALGIAS: 0
CONSTIPATION: 0
ROS GI COMMENTS: IMPROVING APPETITE

## 2022-07-04 ASSESSMENT — FIBROSIS 4 INDEX: FIB4 SCORE: 1.28

## 2022-07-04 NOTE — PROGRESS NOTES
Hospital Medicine Daily Progress Note    Date of Service  7/4/2022    Chief Complaint  Jama Altman is a 66 y.o. male admitted 6/30/2022 with fever and dysuria.    Hospital Course  Mr. Altman has a past medical history of polycystic kidney disease status post renal transplantation 2010 on chronic immunosuppressant therapy followed by Dr. León nephrology was most recently admitted to this hospital from 5/24/2022 through 6/22/2022 due to septic shock with blood cultures positive for E. coli bacteremia.  During that admission he had PEA arrest and required intubation.  He had repeat cultures that were negative and was treated with IV antibiotics. He was found to have a right shin nonhealing ulcerations and underwent an angiogram by Dr. Lopez vascular surgery which revealed an occlusion of the anterior tibial artery though good reperfusion distally.  During the hospitalization he went into renal failure requiring transient dialysis though his renal function improved with a creatinine of 1.4 on discharge.  He was found to have new onset atrial fibrillation and initiated on Eliquis therapy for anticoagulation.  He was discharged to [facility] for wound care.  He developed dysuria and frequency and a fever of 103 therefore was brought to the emergency room on 6/30/2022 where he was found to have septic shock and white count of 15,000 and acute kidney injury with creatinine of 2 and was admitted to intensive care unit requiring intravenous pressors and IV fluids.  Urine and blood cultures are positive again for E. Coli.       Interval Problem Update  7/2/2022: Patient seen and evaluated in the ICU.  He has transfer orders to the IM.  He remains hypotensive.  He is tolerating a diet.  His wounds have been evaluated and we are pending a wound care consultation.  I discussed with Dr. Briggs, infectious disease for consultation given his recurrent E. coli bacteremia.  Tacrolimus level has been  ordered.  7/3: Mr. Altman was evaluated and examined in the IMCU. He remains in afib and tachycardic. His blood pressure has come up to 128/86. The E coli came back ESBL and he is on IV Meropenem. He has a poor appetite and is willing to try supplements such as Boost.    7/4: Mr. Altman was seen and evaluated in the IMCU. He has transfer orders since yesterday. Repeat blood cultures are negative so far. He remains on IV Meropenem. His blood pressure has improved thus midodrine will be stopped. He hopes to go back to [facility] for wound care. A midline will likely be ordered tomorrow if cultures remain negative for IV antibiotics and to remove the central line. Insulin will be stopped and Januvia and home Glyburide will be started.     I have discussed this patient's plan of care and discharge plan at IDT rounds today with Case Management, Nursing, Nursing leadership, and other members of the IDT team.    Consultants/Specialty  critical care  Infectious disease   Code Status  Full Code    Disposition  Patient is not medically cleared for discharge.   Anticipate discharge to to skilled nursing facility.  I have placed the appropriate orders for post-discharge needs.    Review of Systems  Review of Systems   Constitutional: Negative for chills and fever.        Generally weak   Respiratory: Negative for cough and shortness of breath.    Cardiovascular: Negative for chest pain.   Gastrointestinal: Negative for diarrhea, nausea and vomiting.        Improving appetite    All other systems reviewed and are negative.       Physical Exam  Temp:  [36.7 °C (98 °F)-37.1 °C (98.8 °F)] 36.7 °C (98 °F)  Pulse:  [] 70  Resp:  [13-44] 21  BP: ()/(62-97) 153/97  SpO2:  [88 %-98 %] 93 %    Physical Exam  Vitals and nursing note reviewed.   Constitutional:       General: He is not in acute distress.     Appearance: He is not toxic-appearing.   HENT:      Head: Normocephalic and atraumatic.      Mouth/Throat:      Mouth: Mucous  membranes are moist.      Pharynx: Oropharynx is clear.   Eyes:      General: No scleral icterus.     Conjunctiva/sclera: Conjunctivae normal.   Neck:      Comments: Right IJ central line  Cardiovascular:      Rate and Rhythm: Tachycardia present. Rhythm irregular.      Heart sounds: No murmur heard.  Pulmonary:      Effort: Pulmonary effort is normal.      Breath sounds: Normal breath sounds.   Abdominal:      General: There is no distension.      Tenderness: There is no abdominal tenderness.   Musculoskeletal:      Cervical back: Normal range of motion and neck supple.      Comments: Right foot dorsum scab  Right shin 3 large ulcers with granulation tissue.   Skin:     General: Skin is warm and dry.   Neurological:      General: No focal deficit present.      Mental Status: He is alert and oriented to person, place, and time.   Psychiatric:         Mood and Affect: Mood normal.         Behavior: Behavior normal.         Thought Content: Thought content normal.         Judgment: Judgment normal.         Fluids    Intake/Output Summary (Last 24 hours) at 7/4/2022 0712  Last data filed at 7/4/2022 0600  Gross per 24 hour   Intake 120 ml   Output 2450 ml   Net -2330 ml       Laboratory  Recent Labs     07/01/22  1242 07/02/22  0440 07/03/22  0607   WBC 19.8* 11.2* 9.1   RBC 3.17* 3.01* 3.44*   HEMOGLOBIN 8.7* 8.1* 9.3*   HEMATOCRIT 28.1* 26.5* 30.4*   MCV 88.6 88.0 88.4   MCH 27.4 26.9* 27.0   MCHC 31.0* 30.6* 30.6*   RDW 48.8 49.5 48.4   PLATELETCT 218 176 178   MPV 10.4 10.8 10.5     Recent Labs     07/01/22  1242 07/02/22  0440 07/03/22  0607   SODIUM 131* 130* 130*   POTASSIUM 5.0 5.1 4.5   CHLORIDE 103 100 100   CO2 20 21 20   GLUCOSE 143* 156* 196*   BUN 22 23* 20   CREATININE 1.71* 1.57* 1.27   CALCIUM 7.4* 7.8* 8.0*                   Imaging  EC-ECHOCARDIOGRAM COMPLETE W/ CONT   Final Result      DX-CHEST-FOR LINE PLACEMENT Perform procedure in: Patient's Room   Final Result         1.  Interstitial edema  and/or infiltrate.   2.  Atherosclerosis      CT-RENAL COLIC EVALUATION(A/P W/O)   Final Result         1.  Changes compatible with polycystic kidney disease.   2.  Diverticulosis   3.  Fat-containing left inguinal hernia      DX-CHEST-PORTABLE (1 VIEW)   Final Result         1.  Patchy bilateral pulmonary infiltrates or edema.   2.  Atherosclerosis           Assessment/Plan  * Septic shock (HCC)- (present on admission)  Assessment & Plan  This is Septic shock Present on admission  SIRS criteria identified on my evaluation include: Tachycardia, with heart rate greater than 90 BPM  Indicators of septic shock include: Severe sepsis present and persistent hypotension after 30 ml/kg completed.   Sources is: UTI with bacteremia   Sepsis protocol initiated  Crystalloid Fluid Administration: was given  IV antibiotics as appropriate for source of sepsis  Reassessment: I have reassessed the patient's hemodynamic status  He has required IV pressors.  Midodrine has been started 5 mg TID and will be tapered as his blood pressure improves.   If his blood pressure drops, consider stress-dose steroids given his chronic prednisone use.      Bacteremia due to Escherichia coli- (present on admission)  Assessment & Plan  Blood and urine cultures are positive for E coli. Repeat cultures were drawn on 7/3 and, if they remain negative, a midline catheter will be ordered for IV antibiotics.   Sensitivities reveal ESBL while he had nearly pan-sensitive E coli bacteremia 5/24 with subsequent negative cultures.   Dr. Briggs infectious disease has consulted  IV Meropenem for 14 days.     Non-healing wound of right lower extremity- (present on admission)  Assessment & Plan  Extensive ulcers of the right shin  Wound care consult  He has an angiogram last admission by Dr. Lopez which revealed occlusion of the anterior tibial artery with good perfusion of the foot. He had been on the max dose of lipitor with an LDL 47 thus drop lipitor to 20 mg  daily.   He hopes to transfer back to [facility] for further wound care.     Acute-on-chronic kidney injury (HCC)- (present on admission)  Assessment & Plan  Hx of transplant  On last admission he required dialysis   Cr 2 on admission likely secondary to septic shock and has come down to 1.27.    Urinary tract infection- (present on admission)  Assessment & Plan  E coli with bacteremia     PAF (paroxysmal atrial fibrillation) ([facility])- (present on admission)  Assessment & Plan  Continue Eliquis for anticoagulation  He was not on medications for rate control as an outpatient  If he remains tachycardic and his blood pressure can tolerate it, he may be a candidate to start metoprolol      Type 2 diabetes mellitus with kidney complication, without long-term current use of insulin ([facility])- (present on admission)  Assessment & Plan  He had been on Glyburide, Linagliptin, and Januvia as an outpatient   HbA1c was 10 a month ago  Sliding scale insulin was initiated and will be discontinued as Januvia and Glyburide will be restarted.    CAD (coronary artery disease)- (present on admission)  Assessment & Plan  Hx of stent  Echo reveals an EF 45% thus he is at risk of volume overload     History of renal transplant- (present on admission)  Assessment & Plan  At [facility] 2010.  He is followed locally by Dr. León  Continue Prograf, Prednisone, and CellCept.   Tacrolimus level ordered.        VTE prophylaxis: therapeutic anticoagulation with Eliquis    I have performed a physical exam and reviewed and updated ROS and Plan today (7/4/2022). In review of yesterday's note (7/3/2022), there are no changes except as documented above.

## 2022-07-04 NOTE — DISCHARGE PLANNING
"[facility] TCN chart review completed. Previous TCN Christie noted \" Pt readmitted to [facility] from [facility] and states he is ok with returning to [facility] when medically cleared for discharge\".   Collaborated with DWAINE LIGHT and Hospitalist Dr Faulkner. Patient seen at bedside, Hospitalist Dr Kaushik MONIQUE present. Per Hospitalist, mbr will need Power midline access and 14 days of IV antibiotic  Meropenem. Mbr only prefers [facility] as long as they can provide 14 days of IV antibiotic therapy. Referral to [facility] was previously sent on 7/3/2022 pending acceptance. I notified Primary DWAINE Johansen what the Hospitalist told this TCN.  Hospitalist noted on 7/4/2022 \" Patient is not medically cleared for discharge. Anticipate discharge to to skilled nursing facility\".  TCN will continue to follow and collaborate with discharge planning team as additional post acute needs arise. Thank you.    Previously completed:  - Choice forms: SNF  - [facility] referral ( not sent).   "

## 2022-07-04 NOTE — PROGRESS NOTES
Infectious Disease Progress Note    Author: Delmi Romero M.D. Date & Time of service: 2022  11:42 AM    Chief Complaint:  Ecoli UTI and bacteremia     Interval History:  7/3  AF, O2 RA, doing well overall denies any dysuria or flank pain.      Review of Systems:  Review of Systems   Respiratory: Negative for cough and sputum production.    Gastrointestinal: Positive for abdominal pain. Negative for constipation, diarrhea, nausea and vomiting.   Musculoskeletal: Negative for joint pain and myalgias.   Psychiatric/Behavioral: The patient is not nervous/anxious.        Hemodynamics:  Temp (24hrs), Av.9 °C (98.5 °F), Min:36.7 °C (98 °F), Max:37.1 °C (98.8 °F)  Temperature: 37.1 °C (98.7 °F)  Pulse  Av.1  Min: 57  Max: 134   Blood Pressure : (P) 109/74       Physical Exam:  Physical Exam  Cardiovascular:      Rate and Rhythm: Normal rate and regular rhythm.      Heart sounds: Normal heart sounds.   Pulmonary:      Effort: Pulmonary effort is normal.      Breath sounds: Normal breath sounds.   Abdominal:      General: Abdomen is flat. There is no distension.      Palpations: Abdomen is soft.      Tenderness: There is no abdominal tenderness. There is no guarding.   Musculoskeletal:         General: Normal range of motion.      Comments: Reviewed photos of lower extremity wounds, right lower leg wound with some areas of slough but overall appears to be improving.  No obvious infection at either site.   Skin:     General: Skin is warm and dry.   Neurological:      General: No focal deficit present.      Mental Status: He is oriented to person, place, and time.   Psychiatric:         Mood and Affect: Mood normal.         Behavior: Behavior normal.         Meds:    Current Facility-Administered Medications:   •  [START ON 2022] glyBURIDE  •  SITagliptin  •  atorvastatin  •  meropenem  •  norepinephrine (Levophed) infusion  •  senna-docusate **AND** polyethylene glycol/lytes **AND** magnesium hydroxide  **AND** bisacodyl  •  Respiratory Therapy Consult  •  acetaminophen  •  apixaban  •  mycophenolate  •  predniSONE  •  tacrolimus  •  guaiFENesin    Labs:  Recent Labs     07/01/22  1242 07/02/22  0440 07/03/22  0607   WBC 19.8* 11.2* 9.1   RBC 3.17* 3.01* 3.44*   HEMOGLOBIN 8.7* 8.1* 9.3*   HEMATOCRIT 28.1* 26.5* 30.4*   MCV 88.6 88.0 88.4   MCH 27.4 26.9* 27.0   RDW 48.8 49.5 48.4   PLATELETCT 218 176 178   MPV 10.4 10.8 10.5     Recent Labs     07/01/22  1242 07/02/22  0440 07/03/22  0607   SODIUM 131* 130* 130*   POTASSIUM 5.0 5.1 4.5   CHLORIDE 103 100 100   CO2 20 21 20   GLUCOSE 143* 156* 196*   BUN 22 23* 20     Recent Labs     07/01/22  1242 07/02/22  0440 07/03/22  0607   CREATININE 1.71* 1.57* 1.27       Imaging:  CT-HEAD W/O    Result Date: 6/8/2022 6/8/2022 11:01 AM HISTORY/REASON FOR EXAM:  Mental status change, unknown cause. TECHNIQUE/EXAM DESCRIPTION AND NUMBER OF VIEWS: CT of the head without contrast. The study was performed on a helical multidetector CT scanner. Contiguous axial sections were obtained from the skull base through the vertex. Up to date radiation dose reduction adjustments have been utilized to meet ALARA standards for radiation dose reduction. COMPARISON:  CT head, 5/24/2022 at 1140 hours FINDINGS: The calvariae and skull base are unremarkable. There are no extraaxial fluid collections. There is a pattern of cerebral atrophy manifest as enlargement of sulcal markings and ventricular prominence. The ventricular system and basal cisterns are otherwise unremarkable. There are no areas of abnormal density in the brain substance. There are no hemorrhagic lesions. There are no mass effects or shift of midline structures. The brainstem and posterior fossa structures are unremarkable. Paranasal sinuses in the field of view are unremarkable. Bilateral mastoid effusions. Carotid arteriosclerosis.     1.  Cerebral atrophy. 2.  Bilateral mastoid effusions. 3.  Otherwise, Head CT without  contrast within normal limits. No evidence of acute intracranial hemorrhage or mass lesion.     CT-RENAL COLIC EVALUATION(A/P W/O)    Result Date: 6/30/2022 6/30/2022 11:05 PM HISTORY/REASON FOR EXAM:  Flank pain, kidney stone suspected; NAUSEA/VOMITING; PAINFUL URINATION. TECHNIQUE/EXAM DESCRIPTION: CT scan of the abdomen and pelvis without contrast. Noncontrast helical scanning was obtained from the diaphragmatic domes through the pubic symphysis. Low dose optimization technique was utilized for this CT exam including automated exposure control and adjustment of the mA and/or kV according to patient size. COMPARISON: May 27, 2022 FINDINGS: Lower Chest: Linear densities the bilateral lung bases favor changes of atelectasis. Liver: Normal. Spleen: Unremarkable. Pancreas: Unremarkable. Gallbladder: No calcified stones. Biliary: Nondilated. Adrenal glands: Normal. Kidneys: Right pelvic kidney is seen, appearance suggests transplant kidney. The right kidney is otherwise not visualized and is likely surgically absent. There is enlargement of the left kidney with innumerable cysts, appearance most compatible with polycystic kidney disease. No hydronephrosis of the transplant kidney or renal calculi are appreciated. Bowel: No obstruction or acute inflammation. Scattered colonic diverticula are seen. Lymph nodes: No adenopathy. Vasculature: Unremarkable. Peritoneum: Unremarkable without ascites. Musculoskeletal: No acute or destructive process. Pelvis: No adenopathy or free fluid. Fat-containing left inguinal hernia is seen.     1.  Changes compatible with polycystic kidney disease. 2.  Diverticulosis 3.  Fat-containing left inguinal hernia    DX-CHEST-FOR LINE PLACEMENT Perform procedure in: Patient's Room    Result Date: 7/1/2022 7/1/2022 5:03 AM HISTORY/REASON FOR EXAM: Central line placement TECHNIQUE/EXAM DESCRIPTION:  PA and lateral views of the chest. COMPARISON: None FINDINGS: Right internal jugular central  line is seen terminating at the right atriocaval junction.   The cardiac silhouette appears within normal limits. Atherosclerotic calcification of the aorta is noted.  The central pulmonary vasculature appears normal. Bilateral lung volumes are diminished.  Hazy interstitial bilateral pulmonary opacities are seen. No significant pleural effusions are identified. The bony structures appear age-appropriate.     1.  Interstitial edema and/or infiltrate. 2.  Atherosclerosis    DX-CHEST-PORTABLE (1 VIEW)    Result Date: 6/30/2022 6/30/2022 10:44 PM HISTORY/REASON FOR EXAM: Possible sepsis. TECHNIQUE/EXAM DESCRIPTION:  Single AP view of the chest. COMPARISON: June 14, 2022 FINDINGS: The cardiac silhouette appears within normal limits. Atherosclerotic calcification of the aorta is noted.  The central pulmonary vasculature appears normal. The lungs appear well expanded bilaterally.  Patchy bilateral pulmonary opacities are seen. No significant pleural effusions are identified. The bony structures appear age-appropriate.     1.  Patchy bilateral pulmonary infiltrates or edema. 2.  Atherosclerosis    DX-CHEST-PORTABLE (1 VIEW)    Result Date: 6/14/2022 6/14/2022 8:09 AM HISTORY/REASON FOR EXAM:  Shortness of Breath. TECHNIQUE/EXAM DESCRIPTION AND NUMBER OF VIEWS: Single portable view of the chest. COMPARISON:  6/2/2022 FINDINGS: LUNGS: Interval improvement in bilateral pulmonary opacities, likely improving edema. Bilateral infrahilar opacities persist. No effusions. PNEUMOTHORAX: None. LINES AND TUBES: None. MEDIASTINUM: Stable mild cardiomegaly. Atherosclerosis. MUSCULOSKELETAL STRUCTURES: No acute displaced fracture.     1.  Interval improvement in bilateral pulmonary opacities, likely improving edema. 2.  Bilateral infrahilar atelectasis versus consolidations persist. No effusions.     EC-ECHOCARDIOGRAM COMPLETE W/ CONT    Result Date: 7/2/2022  Transthoracic Echo Report Echocardiography Laboratory CONCLUSIONS Compared  to the prior echo on 2022. Definite vegetations are not identified, failure to do so does not exclude their presence or the diagnosis of endocarditis, if clinically indicated, a transesophageal study would be useful. Mildly reduced left ventricular systolic function. The left ventricular ejection fraction is visually estimated to be 45%. Normal right ventricular size and systolic function. Aortic valve sclerosis without significant stenosis. No pericardial effusion. CIARA FORRESTER Exam Date:         2022                    07:25 Exam Location:     Inpatient Priority:          Routine Ordering Physician:        LES MYERS Referring Physician: Sonographer:               Meeta Suarez RDCS Age:    66     Gender:    M MRN:    [id number] :    1955 BSA:    2.32   Ht (in):    77     Wt (lb):    217 Exam Type:     Complete, Contrast Indications:     Endocarditis ICD Codes:       421 CPT Codes:       53016,  BP:   110    /   57     HR: Technical Quality:       Good MEASUREMENTS  (Male / Female) Normal Values 2D ECHO LV Diastolic Diameter PLAX        5.6 cm                4.2 - 5.9 / 3.9 - 5.3 cm LV Systolic Diameter PLAX         4.7 cm                2.1 - 4.0 cm IVS Diastolic Thickness           0.86 cm               LVPW Diastolic Thickness          0.86 cm               LVOT Diameter                     1.9 cm                Estimated LV Ejection Fraction    45 %                  LV Ejection Fraction MOD BP       36.4 %                >= 55  % LV Ejection Fraction MOD 4C       49.7 %                LV Ejection Fraction MOD 2C       33.2 %                LV Ejection Fraction 4C AL        50.1 %                LV Ejection Fraction 2C AL        37.4 %                LA Volume Index                   34.9 cm3/m2           16 - 28 cm3/m2 DOPPLER AV Peak Velocity                  1.5 m/s               AV Peak Gradient                  9.5 mmHg              AV Mean Gradient                  4 mmHg                 LVOT Peak Velocity                1.1 m/s               AV Area Cont Eq vti               2.5 cm2               Mitral E Point Velocity           0.56 m/s              Mitral E to A Ratio               0.85                  MV Pressure Half Time             107 ms                MV Area PHT                       2.1 cm2               MV Deceleration Time              366 ms                TR Peak Velocity                  252 cm/s              PV Peak Velocity                  1 m/s                 PV Peak Gradient                  4.3 mmHg              RVOT Peak Velocity                0.79 m/s              LV E' Lateral Velocity            9.4 cm/s              Mitral E to LV E' Lateral Ratio   6                     LV E' Septal Velocity             6.7 cm/s              Mitral E to LV E' Septal Ratio    8.3                   * Indicates values subject to auto-interpretation LV EF:  45    % FINDINGS Left Ventricle Contrast was used to enhance visualization of the endocardial border. 3 mL of contrast was administered. Existing IV was used at the right arm. Normal left ventricular chamber size. Normal left ventricular wall thickness. Mildly reduced left ventricular systolic function. The left ventricular ejection fraction is visually estimated to be 45%. Global hypokinesis with regional variation. Grade I diastolic dysfunction. Right Ventricle Normal right ventricular size and systolic function. Right Atrium Normal right atrial size. The inferior vena cava is not well visualized. Left Atrium Normal left atrial size. Left atrial volume index is 31 mL/sq m. Mitral Valve Structurally normal mitral valve without significant stenosis. Trace mitral regurgitation. Aortic Valve Tricuspid aortic valve. Aortic valve sclerosis without significant stenosis. No aortic insufficiency. Tricuspid Valve Structurally normal tricuspid valve without significant stenosis. Trace tricuspid regurgitation. Right ventricular  "systolic pressure is estimated to be 28 mmHg. Pulmonic Valve Structurally normal pulmonic valve without significant stenosis. Trace pulmonic insufficiency. Pericardium No pericardial effusion. Aorta Normal aortic root for body surface area. The ascending aorta diameter is 3.3 cm. Mikel Rivera MD (Electronically Signed) Final Date:     02 July 2022                 14:31      Micro:  Results     Procedure Component Value Units Date/Time    BLOOD CULTURE [[id number]] Collected: 07/03/22 1630    Order Status: Completed Specimen: Blood from Peripheral Updated: 07/04/22 0731     Significant Indicator NEG     Source BLD     Site PERIPHERAL     Culture Result No Growth  Note: Blood cultures are incubated for 5 days and  are monitored continuously.Positive blood cultures  are called to the RN and reported as soon as  they are identified.      Narrative:      Per Hospital Policy: Only change Specimen Src: to \"Line\" if  specified by physician order.  Left AC    BLOOD CULTURE [[id number]] Collected: 07/03/22 1625    Order Status: Completed Specimen: Blood from Peripheral Updated: 07/04/22 0731     Significant Indicator NEG     Source BLD     Site PERIPHERAL     Culture Result No Growth  Note: Blood cultures are incubated for 5 days and  are monitored continuously.Positive blood cultures  are called to the RN and reported as soon as  they are identified.      Narrative:      Per Hospital Policy: Only change Specimen Src: to \"Line\" if  specified by physician order.  Right AC    BLOOD CULTURE [[id number]]  (Abnormal) Collected: 06/30/22 2328    Order Status: Completed Specimen: Blood from Peripheral Updated: 07/03/22 1029     Significant Indicator POS     Source BLD     Site PERIPHERAL     Culture Result Growth detected by Bactec instrument. 07/01/2022  13:20      Escherichia coli  See previous culture for sensitivity report.      Narrative:      CALL  [facility]  161 tel. [phone],  CALLED  161 tel. [phone] 07/01/2022, 13:22, RB " "PERF. RESULTS CALLED TO:  [id number] RN  Per Hospital Policy: Only change Specimen Src: to \"Line\" if  specified by physician order.  Right Hand    BLOOD CULTURE [[id number]]  (Abnormal)  (Susceptibility) Collected: 06/30/22 2243    Order Status: Completed Specimen: Blood from Peripheral Updated: 07/03/22 1029     Significant Indicator POS     Source BLD     Site PERIPHERAL     Culture Result Growth detected by Bactec instrument. 07/01/2022  13:35      Escherichia coli ESBL  Extended Spectrum Beta-lactamase (ESBL) isolated.  ESBL's may be clinically resistant to therapy with  Penicillins,Cephalosporins or Aztreonam despite  apparent in vitro susceptibility to some of these agents.  The patient requires contact isolation.  Please contact pharmacy or an Infectious Disease Specialist  if you have any questions about appropriate therapy.      Narrative:      CALL  [facility]  161 tel. [phone],  CALLED  161 tel. [phone] 07/01/2022, 13:36, RB PERF. RESULTS CALLED TO:  [id number]  Per Hospital Policy: Only change Specimen Src: to \"Line\" if  specified by physician order.  Right AC    Susceptibility     Escherichia coli esbl (1)     Antibiotic Interpretation Microscan   Method Status    Ampicillin Resistant >16 mcg/mL CESIA Final    Ceftriaxone Resistant >32 mcg/mL CESIA Final    Cefazolin Resistant >16 mcg/mL CESIA Final    Ciprofloxacin Sensitive <=0.25 mcg/mL CESIA Final    Cefepime Resistant >16 mcg/mL CESIA Final    Cefuroxime Resistant >16 mcg/mL CESIA Final    Ampicillin/sulbactam Sensitive 8/4 mcg/mL CESIA Final    Ertapenem Sensitive <=0.5 mcg/mL CESIA Final    Tobramycin Sensitive <=2 mcg/mL CESIA Final    Gentamicin Sensitive <=2 mcg/mL CESIA Final    Minocycline Sensitive <=4 mcg/mL CESIA Final    Moxifloxacin Sensitive <=2 mcg/mL CESIA Final    Pip/Tazobactam Sensitive <=8 mcg/mL CESIA Final    Trimeth/Sulfa Resistant >2/38 mcg/mL CESIA Final    Tigecycline Sensitive <=2 mcg/mL CESIA Final                   URINE CULTURE(NEW) [[id number]]  (Abnormal)  " (Susceptibility) Collected: 07/01/22 0036    Order Status: Completed Specimen: Urine Updated: 07/03/22 0839     Significant Indicator POS     Source UR     Site -     Culture Result Usual skin shaun 10-50,000 cfu/mL      Escherichia coli ESBL  >100,000 cfu/mL  Extended Spectrum Beta-lactamase (ESBL) isolated.  ESBL's may be clinically resistant to therapy with  Penicillins,Cephalosporins or Aztreonam despite  apparent in vitro susceptibility to some of these agents.  The patient requires contact isolation.  Please contact pharmacy or an Infectious Disease Specialist  if you have any questions about appropriate therapy.      Narrative:      Indication for culture:->Evaluation for sepsis without a  clear source of infection  Indication for culture:->Evaluation for sepsis without a    Susceptibility     Escherichia coli esbl (1)     Antibiotic Interpretation Microscan   Method Status    Ampicillin Resistant >16 mcg/mL CESIA Final    Ceftriaxone Resistant >32 mcg/mL CESIA Final    Cefazolin Resistant >16 mcg/mL CESIA Final     Breakpoints when Cefazolin is used for therapy of infections  other than uncomplicated UTIs due to Enterobacterales are as  follows:  CESIA and Interpretation:  <=2 S  4 I  >=8 R         Ciprofloxacin Sensitive <=0.25 mcg/mL CESIA Final    Cefepime Resistant >16 mcg/mL CESIA Final    Cefuroxime Resistant >16 mcg/mL CESIA Final    Ampicillin/sulbactam Intermediate 16/8 mcg/mL CESIA Final    Tobramycin Sensitive 4 mcg/mL CESIA Final    Nitrofurantoin Sensitive <=32 mcg/mL CESIA Final    Gentamicin Sensitive <=2 mcg/mL CESIA Final    Levofloxacin Sensitive <=0.5 mcg/mL CEISA Final    Minocycline Sensitive <=4 mcg/mL CESIA Final    Pip/Tazobactam Sensitive <=8 mcg/mL CESIA Final    Trimeth/Sulfa Resistant >2/38 mcg/mL CESIA Final    Tigecycline Sensitive <=2 mcg/mL CESIA Final                   CoV-2 and Flu A/B by PCR (24 hour In-House): Collect NP swab in [facility] [[id number]] Collected: 07/01/22 0206    Order Status: Completed  "Specimen: Respirate from Nasopharyngeal Updated: 07/01/22 1705     Influenza virus A RNA Negative     Influenza virus B, PCR Negative     SARS-CoV-2 by PCR NotDetected     Comment: PATIENTS: Important information regarding your results and instructions can  be found at [email or web address]   \"After your  Covid-19 Test\"    [facility] providers: PLEASE REFER TO DE-ESCALATION AND RETESTING PROTOCOL  on [email or web address]    **The Roche SARS-CoV-2 RT-PCR test has been made available for use under the  Emergency Use Authorization (EUA) only.          SARS-CoV-2 Source NP Swab    URINALYSIS [[id number]]  (Abnormal) Collected: 07/01/22 0036    Order Status: Completed Specimen: Urine Updated: 07/01/22 0158     Color Yellow     Character Turbid     Specific Gravity 1.014     Ph 5.0     Glucose Negative mg/dL      Ketones Negative mg/dL      Protein 100 mg/dL      Bilirubin Negative     Urobilinogen, Urine 0.2     Nitrite Positive     Leukocyte Esterase Large     Occult Blood Moderate     Micro Urine Req Microscopic    Narrative:      Indication for culture:->Evaluation for sepsis without a  clear source of infection          Assessment:  Active Hospital Problems    Diagnosis    • *Septic shock ([facility]) [A41.9, R65.21]    • Urinary tract infection [N39.0]    • Non-healing wound of right lower extremity [S81.801A]    • Bacteremia due to Escherichia coli [R78.81, B96.20]    • PAF (paroxysmal atrial fibrillation) ([facility]) [I48.0]    • Acute-on-chronic kidney injury ([facility]) [N17.9, N18.9]    • Type 2 diabetes mellitus with kidney complication, without long-term current use of insulin ([facility]) [E11.29]    • CAD (coronary artery disease) [I25.10]    • History of renal transplant [Z94.0]      Interval 24 hours:      AF, O2 RA  Labs reviewed, no new labs today   Micro reviewed    Patient doing well overall, he has some abdominal soreness that he attributes to increased mobility.  Continued on antibiotics as " below.    Assessment:  66-year-old with history of uncontrolled diabetes mellitus and renal transplant on chronic immunosuppression, history of total right knee arthroplasty and A. fib on Plavix.  Recent admit after ground-level fall with cellulitis and found to have E. coli UTI resulting in septic shock.  On 6/17 he went to the OR for angiograms, vessels found widely patent and no vascular intervention performed.  Now admitted with complaints of dysuria nausea and vomiting. Septic shock and admitted to the ICU.  Blood and urine cultures positive for E. coli.     Septic shock, pressors now weaned off  Bacteremia, urinary source  -Blood cultures on 6/30 +ESBL E. Coli  -Blood cx on 7/3 - NGTD  -TTE on 7/2 with no vegetations, no significant valvular disease, EF 45%, grade 1 diastolic dysfunction  UTI  -Urine cultures on 6/30 + ESBL E. Coli  -CT renal notes the polycystic kidney disease on left, transplant kidney on right, no obvious pyelonephritis or abscess  Renal transplant- due to polycystic kidney disease  9/10/2010  Immune compromised  On mycophenolate, prednisone, and tacrolimus  SANKET, improved  DM  Afib  RLE arterial ulcerations; left knee wound.  Reviewed photos and no obvious infection  History of E. coli bacteremia on 5/24/2022     Plan:     --- Continue meropenem, will recommend a 2-week IV antibiotic course for bacteremia given the immunocompromise of the patient and in the setting of polycystic kidney disease, this will also treat any potential infection in the wounds - end 7/14/22   ---  Follow-up repeat blood cultures to final, no growth to date   --- If any further positive blood cultures with E. coli will recommend RADHA  --- If her cultures are negative at 48 hours (7/5) okay to place midline  --- Recommended the patient discuss his transplant medications with his transplant team and minimize neuro compromise is much as possible   --- Monitor labs  --- Monitor kidney function, avoid nephrotoxins and  renally dose medications    Dispo: Discharge to skilled facility on IV antibiotics  PICC: Place midline if blood cultures from 7/3 remain negative as above      Discussed with Dr. Faulkner. ID will sign off.

## 2022-07-04 NOTE — CARE PLAN
The patient is Stable - Low risk of patient condition declining or worsening    Shift Goals  Clinical Goals: comfort  Patient Goals: comfort  Family Goals: ut    Progress made toward(s) clinical / shift goals:  stable bp, urinating wnl, room air    Patient is not progressing towards the following goals:

## 2022-07-04 NOTE — CARE PLAN
The patient is Watcher - Medium risk of patient condition declining or worsening    Shift Goals  Clinical Goals: transfer,stable bp  Patient Goals: rest,discharge  Family Goals: maryellen    Progress made toward(s) clinical / shift goals:    Problem: Knowledge Deficit - Standard  Goal: Patient and family/care givers will demonstrate understanding of plan of care, disease process/condition, diagnostic tests and medications  Outcome: Progressing     Problem: Hemodynamics  Goal: Patient's hemodynamics, fluid balance and neurologic status will be stable or improve  Outcome: Progressing       Patient is not progressing towards the following goals:

## 2022-07-04 NOTE — DISCHARGE PLANNING
Agency/Facility Name: [facility]  Spoke To: Coleen   Outcome: Pt accepted to resumption of care when medically cleared for SNF discharge.    SW notified

## 2022-07-05 ENCOUNTER — APPOINTMENT (OUTPATIENT)
Dept: RADIOLOGY | Facility: MEDICAL CENTER | Age: 67
DRG: 871 | End: 2022-07-05
Attending: HOSPITALIST
Payer: MEDICARE

## 2022-07-05 PROCEDURE — 05HY33Z INSERTION OF INFUSION DEVICE INTO UPPER VEIN, PERCUTANEOUS APPROACH: ICD-10-PCS | Performed by: HOSPITALIST

## 2022-07-05 PROCEDURE — 700102 HCHG RX REV CODE 250 W/ 637 OVERRIDE(OP): Performed by: STUDENT IN AN ORGANIZED HEALTH CARE EDUCATION/TRAINING PROGRAM

## 2022-07-05 PROCEDURE — 700105 HCHG RX REV CODE 258: Performed by: INTERNAL MEDICINE

## 2022-07-05 PROCEDURE — A9270 NON-COVERED ITEM OR SERVICE: HCPCS | Performed by: HOSPITALIST

## 2022-07-05 PROCEDURE — 700102 HCHG RX REV CODE 250 W/ 637 OVERRIDE(OP): Performed by: HOSPITALIST

## 2022-07-05 PROCEDURE — 99232 SBSQ HOSP IP/OBS MODERATE 35: CPT | Performed by: HOSPITALIST

## 2022-07-05 PROCEDURE — 700111 HCHG RX REV CODE 636 W/ 250 OVERRIDE (IP): Performed by: INTERNAL MEDICINE

## 2022-07-05 PROCEDURE — C1751 CATH, INF, PER/CENT/MIDLINE: HCPCS

## 2022-07-05 PROCEDURE — 700111 HCHG RX REV CODE 636 W/ 250 OVERRIDE (IP): Performed by: HOSPITALIST

## 2022-07-05 PROCEDURE — 700105 HCHG RX REV CODE 258: Performed by: HOSPITALIST

## 2022-07-05 PROCEDURE — A9270 NON-COVERED ITEM OR SERVICE: HCPCS | Performed by: STUDENT IN AN ORGANIZED HEALTH CARE EDUCATION/TRAINING PROGRAM

## 2022-07-05 PROCEDURE — 770001 HCHG ROOM/CARE - MED/SURG/GYN PRIV*

## 2022-07-05 PROCEDURE — 700111 HCHG RX REV CODE 636 W/ 250 OVERRIDE (IP): Performed by: EMERGENCY MEDICINE

## 2022-07-05 RX ORDER — CARVEDILOL 3.12 MG/1
3.12 TABLET ORAL 2 TIMES DAILY WITH MEALS
Status: DISCONTINUED | OUTPATIENT
Start: 2022-07-05 | End: 2022-07-06 | Stop reason: HOSPADM

## 2022-07-05 RX ADMIN — MEROPENEM 500 MG: 500 INJECTION, POWDER, FOR SOLUTION INTRAVENOUS at 15:28

## 2022-07-05 RX ADMIN — APIXABAN 5 MG: 5 TABLET, FILM COATED ORAL at 17:27

## 2022-07-05 RX ADMIN — TACROLIMUS 1 MG: 1 CAPSULE ORAL at 06:14

## 2022-07-05 RX ADMIN — SENNOSIDES AND DOCUSATE SODIUM 2 TABLET: 50; 8.6 TABLET ORAL at 06:14

## 2022-07-05 RX ADMIN — MEROPENEM 500 MG: 500 INJECTION, POWDER, FOR SOLUTION INTRAVENOUS at 09:05

## 2022-07-05 RX ADMIN — MYCOPHENOLATE MOFETIL 500 MG: 250 CAPSULE ORAL at 06:13

## 2022-07-05 RX ADMIN — PREDNISONE 5 MG: 10 TABLET ORAL at 06:14

## 2022-07-05 RX ADMIN — APIXABAN 5 MG: 5 TABLET, FILM COATED ORAL at 06:14

## 2022-07-05 RX ADMIN — ATORVASTATIN CALCIUM 20 MG: 20 TABLET, FILM COATED ORAL at 20:10

## 2022-07-05 RX ADMIN — TACROLIMUS 1 MG: 1 CAPSULE ORAL at 17:27

## 2022-07-05 RX ADMIN — MEROPENEM 500 MG: 500 INJECTION, POWDER, FOR SOLUTION INTRAVENOUS at 03:43

## 2022-07-05 RX ADMIN — ACETAMINOPHEN 650 MG: 325 TABLET, FILM COATED ORAL at 20:10

## 2022-07-05 RX ADMIN — MYCOPHENOLATE MOFETIL 500 MG: 250 CAPSULE ORAL at 17:27

## 2022-07-05 RX ADMIN — CARVEDILOL 3.12 MG: 3.12 TABLET, FILM COATED ORAL at 17:27

## 2022-07-05 RX ADMIN — SITAGLIPTIN 50 MG: 50 TABLET, FILM COATED ORAL at 06:14

## 2022-07-05 RX ADMIN — GLYBURIDE 10 MG: 5 TABLET ORAL at 09:05

## 2022-07-05 RX ADMIN — MEROPENEM 500 MG: 500 INJECTION, POWDER, FOR SOLUTION INTRAVENOUS at 20:11

## 2022-07-05 ASSESSMENT — ENCOUNTER SYMPTOMS
SHORTNESS OF BREATH: 0
PALPITATIONS: 0
CHILLS: 0
WEAKNESS: 1
FEVER: 0
NAUSEA: 0
ABDOMINAL PAIN: 0
COUGH: 0

## 2022-07-05 ASSESSMENT — PAIN DESCRIPTION - PAIN TYPE: TYPE: CHRONIC PAIN

## 2022-07-05 NOTE — DISCHARGE PLANNING
TCN following. HTH/SCP chart review completed. Noted patient acceptance to [facility] for resumption of care once medically cleared. TCN met with patient at bedside. Patient verbalized understanding and agreement to discharge plan. No new TCN needs identified, as patient current discharge plan is SNF level of care, [facility], once medically cleared.     TCN will continue to follow and collaborate with discharge planning team as additional post acute needs arise. Thank you.     Previously completed:  - Choice forms: SNF. Noted patient acceptance for resumption of service at SNF level of care, [facility], once medically cleared.  - [facility] referral (not sent) as patient discharge disposition is SNF level of care

## 2022-07-05 NOTE — PROGRESS NOTES
Hospital Medicine Daily Progress Note    Date of Service  7/5/2022    Chief Complaint  Jama Altman is a 66 y.o. male admitted 6/30/2022 with fever and dysuria.    Hospital Course  Mr. Altman has a past medical history of polycystic kidney disease status post renal transplantation 2010 on chronic immunosuppressant therapy followed by Dr. León nephrology was most recently admitted to this hospital from 5/24/2022 through 6/22/2022 due to septic shock with blood cultures positive for E. coli bacteremia.  During that admission he had PEA arrest and required intubation.  He had repeat cultures that were negative and was treated with IV antibiotics. He was found to have a right shin nonhealing ulcerations and underwent an angiogram by Dr. Lopez vascular surgery which revealed an occlusion of the anterior tibial artery though good reperfusion distally.  During the hospitalization he went into renal failure requiring transient dialysis though his renal function improved with a creatinine of 1.4 on discharge.  He was found to have new onset atrial fibrillation and initiated on Eliquis therapy for anticoagulation.  He was discharged to [facility] nursing [facility] for wound care.  He developed dysuria and frequency and a fever of 103 therefore was brought to the emergency room on 6/30/2022 where he was found to have septic shock and white count of 15,000 and acute kidney injury with creatinine of 2 and was admitted to intensive care unit requiring intravenous pressors and IV fluids.  Urine and blood cultures are positive again for E. Coli.     7/2/2022: Patient seen and evaluated in the ICU.  He has transfer orders to the IMCU.  He remains hypotensive.  He is tolerating a diet.  His wounds have been evaluated and we are pending a wound care consultation.  I discussed with Dr. Briggs, infectious disease for consultation given his recurrent E. coli bacteremia.  Tacrolimus level has been ordered.  7/3: Mr. Altman was  evaluated and examined in the IMCU. He remains in afib and tachycardic. His blood pressure has come up to 128/86. The E coli came back ESBL and he is on IV Meropenem. He has a poor appetite and is willing to try supplements such as Boost.    7/4: Mr. Altman was seen and evaluated in the IMCU. He has transfer orders since yesterday. Repeat blood cultures are negative so far. He remains on IV Meropenem. His blood pressure has improved thus midodrine will be stopped. He hopes to go back to [facility] for wound care. A midline will likely be ordered tomorrow if cultures remain negative for IV antibiotics and to remove the central line. Insulin will be stopped and Januvia and home Glyburide will be started.       Interval Problem Update    Patient is afebrile  On room air  SBP 95-1 40  Tolerating diet  Blood cultures from 7/3/2022 are negative so far  On meropenem      I have discussed this patient's plan of care and discharge plan at IDT rounds today with Case Management, Nursing, Nursing leadership, and other members of the IDT team.    Consultants/Specialty  critical care  Infectious disease   Code Status  Full Code    Disposition  Patient is not medically cleared for discharge.   Anticipate discharge to to skilled nursing facility.  I have placed the appropriate orders for post-discharge needs.    Review of Systems  Review of Systems   Constitutional: Negative for chills and fever.   Respiratory: Negative for cough and shortness of breath.    Cardiovascular: Negative for chest pain and palpitations.   Gastrointestinal: Negative for abdominal pain and nausea.   Skin:        Right lower extremity wounds   Neurological: Positive for weakness (Generalized).   All other systems reviewed and are negative.       Physical Exam  Temp:  [36 °C (96.8 °F)-37.5 °C (99.5 °F)] 37.5 °C (99.5 °F)  Pulse:  [] 110  BP: ()/(57-74) 95/57  SpO2:  [95 %-96 %] 95 %    Physical Exam  Vitals and nursing note reviewed.    Constitutional:       Appearance: He is well-developed. He is not diaphoretic.   HENT:      Head: Normocephalic and atraumatic.      Mouth/Throat:      Pharynx: No oropharyngeal exudate.   Eyes:      General: No scleral icterus.        Right eye: No discharge.         Left eye: No discharge.      Conjunctiva/sclera: Conjunctivae normal.      Pupils: Pupils are equal, round, and reactive to light.   Neck:      Vascular: No JVD.      Trachea: No tracheal deviation.      Comments: Right IJ central line in place  Cardiovascular:      Rate and Rhythm: Normal rate and regular rhythm.      Heart sounds: No murmur heard.    No friction rub. No gallop.   Pulmonary:      Effort: Pulmonary effort is normal. No respiratory distress.      Breath sounds: Normal breath sounds. No stridor. No wheezing.   Chest:      Chest wall: No tenderness.   Abdominal:      General: Bowel sounds are normal. There is no distension.      Palpations: Abdomen is soft.      Tenderness: There is no abdominal tenderness. There is no rebound.   Musculoskeletal:         General: No tenderness.      Cervical back: Neck supple.   Skin:     General: Skin is warm and dry.      Nails: There is no clubbing.      Comments: Right lower extremity wounds dressed with intact dressing  Images reviewed in epic   Neurological:      Mental Status: He is alert and oriented to person, place, and time.      Cranial Nerves: No cranial nerve deficit.      Motor: No abnormal muscle tone.   Psychiatric:         Behavior: Behavior normal.         Fluids    Intake/Output Summary (Last 24 hours) at 7/5/2022 1216  Last data filed at 7/5/2022 1000  Gross per 24 hour   Intake --   Output 2700 ml   Net -2700 ml       Laboratory  Recent Labs     07/03/22  0607   WBC 9.1   RBC 3.44*   HEMOGLOBIN 9.3*   HEMATOCRIT 30.4*   MCV 88.4   MCH 27.0   MCHC 30.6*   RDW 48.4   PLATELETCT 178   MPV 10.5     Recent Labs     07/03/22  0607   SODIUM 130*   POTASSIUM 4.5   CHLORIDE 100   CO2 20    GLUCOSE 196*   BUN 20   CREATININE 1.27   CALCIUM 8.0*                   Imaging  EC-ECHOCARDIOGRAM COMPLETE W/ CONT   Final Result      DX-CHEST-FOR LINE PLACEMENT Perform procedure in: Patient's Room   Final Result         1.  Interstitial edema and/or infiltrate.   2.  Atherosclerosis      CT-RENAL COLIC EVALUATION(A/P W/O)   Final Result         1.  Changes compatible with polycystic kidney disease.   2.  Diverticulosis   3.  Fat-containing left inguinal hernia      DX-CHEST-PORTABLE (1 VIEW)   Final Result         1.  Patchy bilateral pulmonary infiltrates or edema.   2.  Atherosclerosis      IR-MIDLINE CATHETER INSERTION WO GUIDANCE > AGE 3    (Results Pending)        Assessment/Plan  * Septic shock (HCC)- (present on admission)  Assessment & Plan  Secondary to E. coli ESBL  Sources is: UTI with bacteremia     Sepsis resolved    Urinary tract infection- (present on admission)  Assessment & Plan  E coli with bacteremia     Non-healing wound of right lower extremity- (present on admission)  Assessment & Plan  Extensive ulcers of the right shin  Continue wound care  He has an angiogram last admission by Dr. Lopez which revealed occlusion of the anterior tibial artery with good perfusion of the foot. He had been on the max dose of lipitor with an LDL 47 thus drop lipitor to 20 mg daily.     SNF referral discussed with case management    Bacteremia due to Escherichia coli- (present on admission)  Assessment & Plan  Blood and urine cultures are positive for E coli.    Follow-up on repeat blood cultures from 7/3/2022 and if they remain negative plan for midline placement later today  Evaluated by ID with recommendation for 14 days of IV meropenem through 7/14/2022     PAF (paroxysmal atrial fibrillation) (HCC)- (present on admission)  Assessment & Plan  Continue Eliquis  Will start low-dose carvedilol    Acute-on-chronic kidney injury (HCC)- (present on admission)  Assessment & Plan  Hx of transplant  On last  admission he required dialysis   Cr 2 on admission likely secondary to septic shock and has come down to 1.27.    Type 2 diabetes mellitus with kidney complication, without long-term current use of insulin (HCC)- (present on admission)  Assessment & Plan  Restarted on oral hypoglycemic agents    Monitor CBGs    CAD (coronary artery disease)- (present on admission)  Assessment & Plan  Hx of stent  Echo reveals an EF 45% improved from last echo  Blood pressure we will start  low-dose carvedilol  Given SANKET will hold off on ACE inhibitor and Aldactone  Discussed outpatient follow-up with cardiology    History of renal transplant- (present on admission)  Assessment & Plan  At [facility] 2010.  He is followed locally by Dr. León  Continue Prograf, Prednisone, and CellCept.     Serum creatinine improved to baseline  Avoid nephrotoxic agents       VTE prophylaxis: therapeutic anticoagulation with Eliquis    I have performed a physical exam and reviewed and updated ROS and Plan today (7/5/2022). In review of yesterday's note (7/4/2022), there are no changes except as documented above.

## 2022-07-05 NOTE — CARE PLAN
The patient is Stable - Low risk of patient condition declining or worsening    Shift Goals  Clinical Goals: midline placement; mobilize  Patient Goals: rest, mobilize  Family Goals: ut    Progress made toward(s) clinical / shift goals:  Stable hemodynamics. Room air. Mobility increasing.     Patient is not progressing towards the following goals: Pt. Is still a high fall risk.

## 2022-07-05 NOTE — CARE PLAN
Problem: Knowledge Deficit - Standard  Goal: Patient and family/care givers will demonstrate understanding of plan of care, disease process/condition, diagnostic tests and medications  Outcome: Progressing     Problem: Hemodynamics  Goal: Patient's hemodynamics, fluid balance and neurologic status will be stable or improve  Outcome: Progressing     Problem: Fluid Volume  Goal: Fluid volume balance will be maintained  Outcome: Progressing     Problem: Urinary - Renal Perfusion  Goal: Ability to achieve and maintain adequate renal perfusion and functioning will improve  Outcome: Progressing     Problem: Respiratory  Goal: Patient will achieve/maintain optimum respiratory ventilation and gas exchange  Outcome: Progressing     Problem: Mechanical Ventilation  Goal: Safe management of artificial airway and ventilation  Outcome: Progressing  Goal: Successful weaning off mechanical ventilator, spontaneously maintains adequate gas exchange  Outcome: Progressing  Goal: Patient will be able to express needs and understand communication  Outcome: Progressing     Problem: Physical Regulation  Goal: Diagnostic test results will improve  Outcome: Progressing  Goal: Signs and symptoms of infection will decrease  Outcome: Progressing     Problem: Psychosocial  Goal: Patient's level of anxiety will decrease  Outcome: Progressing  Goal: Patient's ability to verbalize feelings about condition will improve  Outcome: Progressing  Goal: Patient's ability to re-evaluate and adapt role responsibilities will improve  Outcome: Progressing  Goal: Patient and family will demonstrate ability to cope with life altering diagnosis and/or procedure  Outcome: Progressing  Goal: Spiritual and cultural needs incorporated into hospitalization  Outcome: Progressing     Problem: Risk for Aspiration  Goal: Patient's risk for aspiration will be absent or decrease  Outcome: Progressing     Problem: Venous Thromboembolism (VTE) Prevention  Goal: The  patient will remain free from venous thromboembolism (VTE)  Outcome: Progressing     Problem: Nutrition  Goal: Patient's nutritional and fluid intake will be adequate or improve  Outcome: Progressing  Goal: Enteral nutrition will be maintained or improve  Outcome: Progressing  Goal: Enteral nutrition will be maintained or improve  Outcome: Progressing     Problem: Urinary Elimination  Goal: Establish and maintain regular urinary output  Outcome: Progressing     Problem: Bowel Elimination  Goal: Establish and maintain regular bowel function  Outcome: Progressing     Problem: Pain - Standard  Goal: Alleviation of pain or a reduction in pain to the patient’s comfort goal  Outcome: Progressing     Problem: Skin Integrity  Goal: Skin integrity is maintained or improved  Outcome: Progressing     Problem: Fall Risk  Goal: Patient will remain free from falls  Outcome: Progressing   The patient is Stable - Low risk of patient condition declining or worsening    Shift Goals  Clinical Goals: comfort  Patient Goals: comfort  Family Goals: ut    Progress made toward(s) clinical / shift goals: Slept well overnight, vitals stable, no events.     Patient is not progressing towards the following goals:

## 2022-07-05 NOTE — THERAPY
Physical Therapy Contact Note    PT consult received and acknowledged. PT evaluation attempted; patient declined need for acute PT, reported he has been ambulating in unit with RN staff and is pending DC back to SNF. Will monitor for appropriateness/need for acute PT.    Bonita Nur, PT, DPT  [phone]

## 2022-07-06 VITALS
TEMPERATURE: 98.6 F | HEIGHT: 77 IN | BODY MASS INDEX: 28.22 KG/M2 | DIASTOLIC BLOOD PRESSURE: 92 MMHG | SYSTOLIC BLOOD PRESSURE: 129 MMHG | OXYGEN SATURATION: 96 % | WEIGHT: 238.98 LBS | RESPIRATION RATE: 19 BRPM | HEART RATE: 91 BPM

## 2022-07-06 PROBLEM — R65.21 SEPTIC SHOCK (HCC): Status: RESOLVED | Noted: 2022-07-01 | Resolved: 2022-07-06

## 2022-07-06 PROBLEM — A41.9 SEPTIC SHOCK (HCC): Status: RESOLVED | Noted: 2022-07-01 | Resolved: 2022-07-06

## 2022-07-06 PROBLEM — N17.9 ACUTE-ON-CHRONIC KIDNEY INJURY (HCC): Status: RESOLVED | Noted: 2017-09-27 | Resolved: 2022-07-06

## 2022-07-06 PROBLEM — N18.9 ACUTE-ON-CHRONIC KIDNEY INJURY (HCC): Status: RESOLVED | Noted: 2017-09-27 | Resolved: 2022-07-06

## 2022-07-06 LAB
ANION GAP SERPL CALC-SCNC: 8 MMOL/L (ref 7–16)
BASOPHILS # BLD AUTO: 0.6 % (ref 0–1.8)
BASOPHILS # BLD: 0.05 K/UL (ref 0–0.12)
BUN SERPL-MCNC: 14 MG/DL (ref 8–22)
CALCIUM SERPL-MCNC: 8.2 MG/DL (ref 8.5–10.5)
CHLORIDE SERPL-SCNC: 104 MMOL/L (ref 96–112)
CO2 SERPL-SCNC: 23 MMOL/L (ref 20–33)
CREAT SERPL-MCNC: 1.1 MG/DL (ref 0.5–1.4)
EOSINOPHIL # BLD AUTO: 0.06 K/UL (ref 0–0.51)
EOSINOPHIL NFR BLD: 0.7 % (ref 0–6.9)
ERYTHROCYTE [DISTWIDTH] IN BLOOD BY AUTOMATED COUNT: 47.5 FL (ref 35.9–50)
GFR SERPLBLD CREATININE-BSD FMLA CKD-EPI: 74 ML/MIN/1.73 M 2
GLUCOSE SERPL-MCNC: 87 MG/DL (ref 65–99)
HCT VFR BLD AUTO: 29.6 % (ref 42–52)
HGB BLD-MCNC: 9 G/DL (ref 14–18)
IMM GRANULOCYTES # BLD AUTO: 0.39 K/UL (ref 0–0.11)
IMM GRANULOCYTES NFR BLD AUTO: 4.5 % (ref 0–0.9)
LYMPHOCYTES # BLD AUTO: 1.1 K/UL (ref 1–4.8)
LYMPHOCYTES NFR BLD: 12.8 % (ref 22–41)
MCH RBC QN AUTO: 26.2 PG (ref 27–33)
MCHC RBC AUTO-ENTMCNC: 30.4 G/DL (ref 33.7–35.3)
MCV RBC AUTO: 86 FL (ref 81.4–97.8)
MONOCYTES # BLD AUTO: 0.94 K/UL (ref 0–0.85)
MONOCYTES NFR BLD AUTO: 10.9 % (ref 0–13.4)
NEUTROPHILS # BLD AUTO: 6.06 K/UL (ref 1.82–7.42)
NEUTROPHILS NFR BLD: 70.5 % (ref 44–72)
NRBC # BLD AUTO: 0 K/UL
NRBC BLD-RTO: 0 /100 WBC
PLATELET # BLD AUTO: 205 K/UL (ref 164–446)
PMV BLD AUTO: 10.7 FL (ref 9–12.9)
POTASSIUM SERPL-SCNC: 4.5 MMOL/L (ref 3.6–5.5)
RBC # BLD AUTO: 3.44 M/UL (ref 4.7–6.1)
SARS-COV+SARS-COV-2 AG RESP QL IA.RAPID: NOTDETECTED
SODIUM SERPL-SCNC: 135 MMOL/L (ref 135–145)
SPECIMEN SOURCE: NORMAL
WBC # BLD AUTO: 8.6 K/UL (ref 4.8–10.8)

## 2022-07-06 PROCEDURE — 80048 BASIC METABOLIC PNL TOTAL CA: CPT

## 2022-07-06 PROCEDURE — 700111 HCHG RX REV CODE 636 W/ 250 OVERRIDE (IP): Performed by: HOSPITALIST

## 2022-07-06 PROCEDURE — 87426 SARSCOV CORONAVIRUS AG IA: CPT

## 2022-07-06 PROCEDURE — A9270 NON-COVERED ITEM OR SERVICE: HCPCS | Performed by: HOSPITALIST

## 2022-07-06 PROCEDURE — 700102 HCHG RX REV CODE 250 W/ 637 OVERRIDE(OP): Performed by: HOSPITALIST

## 2022-07-06 PROCEDURE — 700102 HCHG RX REV CODE 250 W/ 637 OVERRIDE(OP): Performed by: STUDENT IN AN ORGANIZED HEALTH CARE EDUCATION/TRAINING PROGRAM

## 2022-07-06 PROCEDURE — 700105 HCHG RX REV CODE 258: Performed by: HOSPITALIST

## 2022-07-06 PROCEDURE — 99239 HOSP IP/OBS DSCHRG MGMT >30: CPT | Performed by: HOSPITALIST

## 2022-07-06 PROCEDURE — 85025 COMPLETE CBC W/AUTO DIFF WBC: CPT

## 2022-07-06 PROCEDURE — 700111 HCHG RX REV CODE 636 W/ 250 OVERRIDE (IP): Performed by: EMERGENCY MEDICINE

## 2022-07-06 PROCEDURE — A9270 NON-COVERED ITEM OR SERVICE: HCPCS | Performed by: STUDENT IN AN ORGANIZED HEALTH CARE EDUCATION/TRAINING PROGRAM

## 2022-07-06 RX ORDER — MEROPENEM 500 MG/1
500 INJECTION, POWDER, FOR SOLUTION INTRAVENOUS EVERY 6 HOURS
Qty: 16 G | Refills: 0 | Status: SHIPPED
Start: 2022-07-06 | End: 2022-07-14

## 2022-07-06 RX ORDER — CARVEDILOL 3.12 MG/1
3.12 TABLET ORAL 2 TIMES DAILY WITH MEALS
Qty: 60 TABLET | Status: SHIPPED
Start: 2022-07-06 | End: 2022-07-25

## 2022-07-06 RX ADMIN — SITAGLIPTIN 50 MG: 50 TABLET, FILM COATED ORAL at 05:34

## 2022-07-06 RX ADMIN — MYCOPHENOLATE MOFETIL 500 MG: 250 CAPSULE ORAL at 05:34

## 2022-07-06 RX ADMIN — TACROLIMUS 1 MG: 1 CAPSULE ORAL at 05:33

## 2022-07-06 RX ADMIN — MEROPENEM 500 MG: 500 INJECTION, POWDER, FOR SOLUTION INTRAVENOUS at 16:04

## 2022-07-06 RX ADMIN — MEROPENEM 500 MG: 500 INJECTION, POWDER, FOR SOLUTION INTRAVENOUS at 03:04

## 2022-07-06 RX ADMIN — APIXABAN 5 MG: 5 TABLET, FILM COATED ORAL at 05:32

## 2022-07-06 RX ADMIN — CARVEDILOL 3.12 MG: 3.12 TABLET, FILM COATED ORAL at 09:05

## 2022-07-06 RX ADMIN — PREDNISONE 5 MG: 10 TABLET ORAL at 05:32

## 2022-07-06 RX ADMIN — GLYBURIDE 10 MG: 5 TABLET ORAL at 09:05

## 2022-07-06 RX ADMIN — MEROPENEM 500 MG: 500 INJECTION, POWDER, FOR SOLUTION INTRAVENOUS at 09:05

## 2022-07-06 ASSESSMENT — FIBROSIS 4 INDEX: FIB4 SCORE: 1.28

## 2022-07-06 NOTE — CARE PLAN
Problem: Knowledge Deficit - Standard  Goal: Patient and family/care givers will demonstrate understanding of plan of care, disease process/condition, diagnostic tests and medications  Outcome: Progressing     Problem: Physical Regulation  Goal: Diagnostic test results will improve  Outcome: Progressing  Goal: Signs and symptoms of infection will decrease  Outcome: Progressing     Problem: Psychosocial  Goal: Patient's level of anxiety will decrease  Outcome: Progressing  Goal: Patient's ability to verbalize feelings about condition will improve  Outcome: Progressing  Goal: Patient's ability to re-evaluate and adapt role responsibilities will improve  Outcome: Progressing  Goal: Patient and family will demonstrate ability to cope with life altering diagnosis and/or procedure  Outcome: Progressing  Goal: Spiritual and cultural needs incorporated into hospitalization  Outcome: Progressing     Problem: Venous Thromboembolism (VTE) Prevention  Goal: The patient will remain free from venous thromboembolism (VTE)  Outcome: Progressing     Problem: Nutrition  Goal: Patient's nutritional and fluid intake will be adequate or improve  Outcome: Progressing  Goal: Enteral nutrition will be maintained or improve  Outcome: Progressing  Goal: Enteral nutrition will be maintained or improve  Outcome: Progressing     Problem: Fall Risk  Goal: Patient will remain free from falls  Outcome: Progressing   The patient is Stable - Low risk of patient condition declining or worsening    Shift Goals  Clinical Goals: Sleep, vitals stable  Patient Goals: Sleep  Family Goals: ut    Progress made toward(s) clinical / shift goals:      Patient is not progressing towards the following goals:

## 2022-07-06 NOTE — DISCHARGE PLANNING
Agency/Facility Name: [facility]   Outcome: Attempted to contact [facility] in regards to referral and bed availability. Voicemail service unavailable. DPA to call again later today to try to get in contact.     @[id number]  Agency/Facility Name: [facility]    Outcome: Left a voicemail with [facility] asking about the status of referral and when a bed would be available for him. DPA waiting for callback.     @1051  Agency/Facility Name: [facility]   Spoke To: [facility]  Outcome: Received call back from [facility]. Can take him today around 1300. Has to be early due to transport schedule. Will just need the discharge summary and a negative covid test prior to arrival. CM informed.     @1410  Agency/Facility Name: [facility]  Outcome: Left a voicemail with [facility] letting her know the new transport time. Pt is getting transport set up now and will be leaving at 16:15.

## 2022-07-06 NOTE — DISCHARGE INSTRUCTIONS
Discharge Instructions    Discharged to other by medical transportation with escort. Discharged via ambulance, hospital escort: Yes.  Special equipment needed: Not Applicable    Be sure to schedule a follow-up appointment with your primary care doctor or any specialists as instructed.     Discharge Plan:   Diet Plan: Discussed  Activity Level: Discussed  Confirmed Follow up Appointment: Appointment Scheduled  Confirmed Symptoms Management: Discussed  Medication Reconciliation Updated: Yes    I understand that a diet low in cholesterol, fat, and sodium is recommended for good health. Unless I have been given specific instructions below for another diet, I accept this instruction as my diet prescription.   Other diet: cardiac    Special Instructions: None    -Is this patient being discharged with medication to prevent blood clots?  Yes, Lovenox   Enoxaparin injection  What is this medicine?  ENOXAPARIN (ee nox a PA rin) is used after knee, hip, or abdominal surgeries to prevent blood clotting. It is also used to treat existing blood clots in the lungs or in the veins.  This medicine may be used for other purposes; ask your health care provider or pharmacist if you have questions.  COMMON BRAND NAME(S): Lovenox  What should I tell my health care provider before I take this medicine?  They need to know if you have any of these conditions:  bleeding disorders, hemorrhage, or hemophilia  infection of the heart or heart valves  kidney or liver disease  previous stroke  prosthetic heart valve  recent surgery or delivery of a baby  ulcer in the stomach or intestine, diverticulitis, or other bowel disease  an unusual or allergic reaction to enoxaparin, heparin, pork or pork products, other medicines, foods, dyes, or preservatives  pregnant or trying to get pregnant  breast-feeding  How should I use this medicine?  This medicine is for injection under the skin. It is usually given by a health-care professional. You or a family  member may be trained on how to give the injections. If you are to give yourself injections, make sure you understand how to use the syringe, measure the dose if necessary, and give the injection. To avoid bruising, do not rub the site where this medicine has been injected. Do not take your medicine more often than directed. Do not stop taking except on the advice of your doctor or health care professional.  Make sure you receive a puncture-resistant container to dispose of the needles and syringes once you have finished with them. Do not reuse these items. Return the container to your doctor or health care professional for proper disposal.  Talk to your pediatrician regarding the use of this medicine in children. Special care may be needed.  Overdosage: If you think you have taken too much of this medicine contact a poison control center or emergency room at once.  NOTE: This medicine is only for you. Do not share this medicine with others.  What if I miss a dose?  If you miss a dose, take it as soon as you can. If it is almost time for your next dose, take only that dose. Do not take double or extra doses.  What may interact with this medicine?  aspirin and aspirin-like medicines  certain medicines that treat or prevent blood clots  dipyridamole  NSAIDs, medicines for pain and inflammation, like ibuprofen or naproxen  This list may not describe all possible interactions. Give your health care provider a list of all the medicines, herbs, non-prescription drugs, or dietary supplements you use. Also tell them if you smoke, drink alcohol, or use illegal drugs. Some items may interact with your medicine.  What should I watch for while using this medicine?  Visit your healthcare professional for regular checks on your progress. You may need blood work done while you are taking this medicine. Your condition will be monitored carefully while you are receiving this medicine. It is important not to miss any  appointments.  If you are going to need surgery or other procedure, tell your healthcare professional that you are using this medicine.  Using this medicine for a long time may weaken your bones and increase the risk of bone fractures.  Avoid sports and activities that might cause injury while you are using this medicine. Severe falls or injuries can cause unseen bleeding. Be careful when using sharp tools or knives. Consider using an electric razor. Take special care brushing or flossing your teeth. Report any injuries, bruising, or red spots on the skin to your healthcare professional.  Wear a medical ID bracelet or chain. Carry a card that describes your disease and details of your medicine and dosage times.  What side effects may I notice from receiving this medicine?  Side effects that you should report to your doctor or health care professional as soon as possible:  allergic reactions like skin rash, itching or hives, swelling of the face, lips, or tongue  bone pain  signs and symptoms of bleeding such as bloody or black, tarry stools; red or dark-brown urine; spitting up blood or brown material that looks like coffee grounds; red spots on the skin; unusual bruising or bleeding from the eye, gums, or nose  signs and symptoms of a blood clot such as chest pain; shortness of breath; pain, swelling, or warmth in the leg  signs and symptoms of a stroke such as changes in vision; confusion; trouble speaking or understanding; severe headaches; sudden numbness or weakness of the face, arm or leg; trouble walking; dizziness; loss of coordination  Side effects that usually do not require medical attention (report to your doctor or health care professional if they continue or are bothersome):  hair loss  pain, redness, or irritation at site where injected  This list may not describe all possible side effects. Call your doctor for medical advice about side effects. You may report side effects to FDA at  1-800-FDA-1088.  Where should I keep my medicine?  Keep out of the reach of children.  Store at room temperature between 15 and 30 degrees C (59 and 86 degrees F). Do not freeze. If your injections have been specially prepared, you may need to store them in the refrigerator. Ask your pharmacist. Throw away any unused medicine after the expiration date.  NOTE: This sheet is a summary. It may not cover all possible information. If you have questions about this medicine, talk to your doctor, pharmacist, or health care provider.  © 2020 [employer]vier/Gold Standard (2018-12-13 11:25:34)    Is patient discharged on Warfarin / Coumadin?   No

## 2022-07-06 NOTE — DISCHARGE PLANNING
Case Management Discharge Planning    Admission Date: 6/30/2022  GMLOS: 4.8  ALOS: 5    6-Clicks ADL Score: 17  6-Clicks Mobility Score: 16    Patient discharging back to [facility]. Patient's son notified and in agreement with plan. Transport requested via [facility] due to need to maintain midline en route, as well as due to contact precautions for ESBL. Confirmation received that patient will be transported at 1615. Transfer packet and COBRA form completed.

## 2022-07-06 NOTE — DISCHARGE PLANNING
DC Transport Scheduled    Received request at: 1332    Transport Company Scheduled:  [employer]  Spoke with Delmi at [facility] to schedule transport.    Scheduled Date: 07/06/2022  Scheduled Time: 1615    Destination: [facility]    Notified care team of scheduled transport via [employer].     If there are any changes needed to the DC transportation scheduled, please contact [facility] at ext. [phone] between the hours of 1719-1156 Mon-Fri. If outside those hours, contact the ED Case Manager at ext. [phone].

## 2022-07-06 NOTE — CARE PLAN
Problem: Knowledge Deficit - Standard  Goal: Patient and family/care givers will demonstrate understanding of plan of care, disease process/condition, diagnostic tests and medications  Outcome: Progressing     Problem: Physical Regulation  Goal: Diagnostic test results will improve  Outcome: Progressing  Goal: Signs and symptoms of infection will decrease  Outcome: Progressing     Problem: Psychosocial  Goal: Patient's level of anxiety will decrease  Outcome: Progressing  Goal: Patient's ability to verbalize feelings about condition will improve  Outcome: Progressing  Goal: Patient's ability to re-evaluate and adapt role responsibilities will improve  Outcome: Progressing  Goal: Patient and family will demonstrate ability to cope with life altering diagnosis and/or procedure  Outcome: Progressing  Goal: Spiritual and cultural needs incorporated into hospitalization  Outcome: Progressing     Problem: Venous Thromboembolism (VTE) Prevention  Goal: The patient will remain free from venous thromboembolism (VTE)  Outcome: Progressing     Problem: Nutrition  Goal: Patient's nutritional and fluid intake will be adequate or improve  Outcome: Progressing  Goal: Enteral nutrition will be maintained or improve  Outcome: Progressing  Goal: Enteral nutrition will be maintained or improve  Outcome: Progressing     Problem: Fall Risk  Goal: Patient will remain free from falls  Outcome: Progressing   The patient is Stable - Low risk of patient condition declining or worsening    Shift Goals  Clinical Goals: Sleep, vitals stable  Patient Goals: Sleep  Family Goals: ut    Progress made toward(s) clinical / shift goals: Slept well overnight, vitals stable     Patient is not progressing towards the following goals:

## 2022-07-06 NOTE — PROCEDURES
Vascular Access Team    Date of Insertion: 7/5/22  Arm Circumference: 35.5  Internal length: 17  External Length: 1  Vein Occupancy %: 33  Reason for Midline: Antibiotic Therapy  Labs: WBC 9.1, , BUN 20, Cr 1.27, GFR 62, INR 1.65 on 7/1/22    Orders confirmed, vessel patency confirmed with ultrasound. Risks and benefits of procedure explained to patient and education regarding line associated bloodstream infections provided. Questions answered.     Power Midline placed in LUE per licensed provider order with ultrasound guidance. 4 Fr, single lumen Power Midline placed in brachial vein after 2 attempt(s). 2 mL of 1% lidocaine injected intradermally, 21 gauge microintroducer needle was visualized entering the vein and modified Seldinger technique used. 17 cm catheter inserted with good blood return. Secured at 1 cm marker. Internal positioning stylet removed and verified to be intact. Each lumen flushed without resistance with 10 mL 0.9% normal saline. Midline secured with Biopatch and Tegaderm.     Midline placement is confirmed by nurse using ultrasound and ability to flush and draw blood. Midline is appropriate for use at this time.  Patient experienced some pain, resolved post procedure, without complications.  No X-ray is needed for placement confirmation.  Patient condition relayed to unit RN or ordering physician via this post procedure note in the EMR.     Ultrasound images uploaded to PACS and viewable in the EMR - yes  Ultrasound imaged printed and placed in paper chart - no     BARD Power Midline ref # [id number], Lot # [id number], Expiration Date 6/30/23

## 2022-07-06 NOTE — DISCHARGE PLANNING
TCN following. HTH/SCP chart review completed. Noted patient is a possible discharge today, previously accepted to [facility], pending bed availability. CM following regarding bed availability.     No new TCN needs identified this day as current discharge plan is SNF level of care, [facility], pending medical clearance and bed availability.     TCN is available to collaborate via voalte should needs arise necessitating TCN involvement in patient discharge planning.     Previously completed:  - Choice forms: SNF. Noted patient acceptance for resumption of service at SNF level of care, [facility], once medically cleared, pending bed availability  - [facility] referral (not sent) as patient discharge disposition is SNF level of care    1100am - Appreciate DPA note 7/6/22 at 1055 indicating [facility] to take patient today, around 1300.

## 2022-07-06 NOTE — DISCHARGE SUMMARY
Discharge Summary    CHIEF COMPLAINT ON ADMISSION  Chief Complaint   Patient presents with   • N/V     BIBA from [facility] for n/v x 2 days and UTI symptoms x2 days, states having burning,frequency,cloudy color  RUQ and RLQ abdominal pain  Was recently discharged 1 week ago after admission for urosepsis  4 zofran PTA and 300ml   • Painful Urination       Reason for Admission  UTI    Admission Date  6/30/2022     CODE STATUS  Full Code    HPI & HOSPITAL COURSE      Mr. Altman has a past medical history of polycystic kidney disease status post renal transplantation 2010 on chronic immunosuppressant therapy followed by Dr. León nephrology was most recently admitted to this hospital from 5/24/2022 through 6/22/2022 due to septic shock with blood cultures positive for E. coli bacteremia.  During that admission he had PEA arrest and required intubation.  He had repeat cultures that were negative and was treated with IV antibiotics. He was found to have a right shin nonhealing ulcerations and underwent an angiogram by Dr. Lopez vascular surgery which revealed an occlusion of the anterior tibial artery though good reperfusion distally.  During the hospitalization he went into renal failure requiring transient dialysis though his renal function improved with a creatinine of 1.4 on discharge.  He was found to have new onset atrial fibrillation and initiated on Eliquis therapy for anticoagulation.  He was discharged to [facility] skilled nursing facility for wound care.  He developed dysuria and frequency and a fever of 103 therefore was brought to the emergency room on 6/30/2022 where he was found to have septic shock and white count of 15,000 and acute kidney injury with creatinine of 2 and was admitted to intensive care unit requiring intravenous pressors and IV fluids.    Urine and blood cultures were positive for E. coli ESBL he was started on meropenem and evaluated by infectious disease with recommendation for 14-day  course of antibiotics through 7/14/2022.  Patient's sepsis resolved.  His SANKET also resolved with normalization of his renal function.  He continued to receive wound care.  Repeat blood cultures from 7 3 were negative  Echocardiogram done revealed EF of 45% which is improved from his last echocardiogram in May.  I have started him on low-dose carvedilol.  Held off on ACE inhibitor and Aldactone given SANKET.  Patient is otherwise clinically stable to transfer back to [facility]      Therefore, he is discharged in good and stable condition to skilled nursing facility.    The patient met 2-midnight criteria for an inpatient stay at the time of discharge.      FOLLOW UP ITEMS POST DISCHARGE  Follow-up with cardiology and repeat echocardiogram  Monitor renal function  Complete antibiotic course for E. coli ESBL through 7/14/2022  Continue wound care    DISCHARGE DIAGNOSES  Principal Problem (Resolved):    Septic shock (HCC) POA: Yes  Active Problems:    History of renal transplant POA: Yes      Overview:     CAD (coronary artery disease) POA: Yes    Type 2 diabetes mellitus with kidney complication, without long-term current use of insulin ([facility]) POA: Yes    PAF (paroxysmal atrial fibrillation) ([facility]) POA: Yes    Bacteremia due to Escherichia coli POA: Yes    Non-healing wound of right lower extremity POA: Yes    Urinary tract infection POA: Yes  Resolved Problems:    Acute-on-chronic kidney injury (HCC) POA: Yes    Gram-negative bacteremia POA: Yes      FOLLOW UP  Future Appointments   Date Time Provider Department Center   8/9/2022  8:00 AM Ganesh Benton III, M.D. [facility]     No follow-up provider specified.    MEDICATIONS ON DISCHARGE     Medication List      START taking these medications      Instructions   carvedilol 3.125 MG Tabs  Commonly known as: COREG   Doctor's comments: Hold for SBP<100 or P<55  Take 1 Tablet by mouth 2 times a day with meals.  Dose: 3.125 mg     meropenem 500 MG Solr  Commonly known as:  Merrem   Infuse 500 mg into a venous catheter every 6 hours for 8 days.  Dose: 500 mg        CONTINUE taking these medications      Instructions   acetaminophen 325 MG Tabs  Commonly known as: Tylenol   Take 325-650 mg by mouth every four hours as needed for Mild Pain, Moderate Pain or Fever. NOT TO EXCEED 3,000 mg of acetaminophen per 24 hours.  Dose: 325-650 mg     apixaban 5mg Tabs  Commonly known as: ELIQUIS   Take 1 Tablet by mouth 2 times a day. Indications: Thromboembolism secondary to Atrial Fibrillation  Dose: 5 mg     atorvastatin 80 MG tablet  Commonly known as: LIPITOR   Take 1 Tablet by mouth at bedtime.  Dose: 80 mg     carboxymethylcellulose 0.5 % Soln  Commonly known as: REFRESH TEARS   Administer 1 Drop into both eyes 3 times a day.  Dose: 1 Drop     gabapentin 300 MG Caps  Commonly known as: NEURONTIN   Take 300 mg by mouth at bedtime.  Dose: 300 mg     glyBURIDE 5 MG Tabs  Commonly known as: DIABETA   Take 10 mg by mouth every morning with breakfast. 2 tablets = 10 mg.  Dose: 10 mg     mycophenolate 250 MG Caps  Commonly known as: CELLCEPT   Take 2 Capsules by mouth 2 times a day.  Dose: 500 mg     predniSONE 5 MG Tabs  Commonly known as: DELTASONE   Take 1 Tablet by mouth every day.  Dose: 5 mg     SITagliptin 50 MG Tabs  Commonly known as: Januvia   Take 1 Tablet by mouth every day.  Dose: 50 mg     tacrolimus 1 MG Caps  Commonly known as: PROGRAF   Take 1 Capsule by mouth 2 times a day.  Dose: 1 mg     Tradjenta 5 MG Tabs tablet  Generic drug: linagliptin   Take 5 mg by mouth every day.  Dose: 5 mg        STOP taking these medications    midodrine 5 MG Tabs  Commonly known as: PROAMATINE            Allergies  Allergies   Allergen Reactions   • Doxycycline Rash     Sweats and shakes: 9/28/17: Clarified allergy with patient. Allergy was in 1998 and he doesn't remember what happened. He thought the medication is for pain.  Tolerates doxycycline 9/2017       DIET  Orders Placed This Encounter    Procedures   • Diet Order Diet: Consistent CHO (Diabetic)     Standing Status:   Standing     Number of Occurrences:   1     Order Specific Question:   Diet:     Answer:   Consistent CHO (Diabetic) [4]       ACTIVITY  As tolerated.  Weight bearing as tolerated    LINES, DRAINS, AND WOUNDS  This is an automated list. Peripheral IVs will be removed prior to discharge.  Midline IV 07/05/22 Left;Medial Upper arm (Active)   Site Assessment Clean;Dry;Intact 07/05/22 2100   Dressing Type Biopatch;Securing device;Skin barrier;Transparent 07/05/22 2100   Line Status Saline locked;Flushed;Blood return noted 07/05/22 2100   Dressing Status Clean;Dry;Intact 07/05/22 2100   Dressing Intervention N/A 07/05/22 2100   Dressing Change Due 07/09/22 07/05/22 1726   Date Primary Tubing Changed 07/05/22 07/05/22 2100   Date Secondary Tubing Changed 07/05/22 07/05/22 2100   [facility] 0 07/05/22 2100   Phlebitis Scale ([facility] Only) 0 07/05/22 2100       Moisture Associated Skin Damage 06/01/22 Buttock (Active)   Drainage Amount None 07/06/22 0200   Periwound Assessment Clean;Dry 07/06/22 0200   IAD Cleansing Soap and Water 07/06/22 0900       Wound 05/25/22 Soft Tissue Necrosis Leg Anterior Right (Active)   Wound Image   07/03/22 1300   Site Assessment Dry;Clean;JUDITH 07/06/22 0200   Periwound Assessment Dry;Clean;Pink 07/06/22 0200   Margins Defined edges 07/05/22 0800   Drainage Amount Small 07/03/22 1300   Drainage Description Serosanguineous 07/03/22 1300   Treatments CSWD - Conservative Sharp Wound Debridement;Cleansed;Irrigation;Site care 07/03/22 1300   Wound Cleansing Approved Wound Cleanser 07/03/22 1300   Periwound Protectant Skin Protectant Wipes to Periwound 07/03/22 1300   Dressing Cleansing/Solutions Not Applicable 07/03/22 1300   Dressing Options Hydrofiber Silver;Absorbent Abdominal Pad;Dry Roll Gauze;Tubigrip 07/03/22 1300   Dressing Changed New 07/03/22 1300   Dressing Status Clean;Dry;Intact  07/05/22 2000   Dressing Change/Treatment Frequency Daily, and As Needed 07/03/22 1600   NEXT Dressing Change/Treatment Date 07/05/22 07/03/22 2000   NEXT Weekly Photo (Inpatient Only) 07/10/22 07/03/22 1300   Shape 3 separate wounds that are located at the anterior aspect of the right leg.  has a small bridge to the medial posterior wound, but partial thickness bridge, likely to heal with wound care. 07/03/22 1300   Wound Odor None 07/03/22 1300   Pulses Right;1+;DP;PT 07/03/22 1300   Exposed Structures JUDITH;None 07/03/22 1300   WOUND NURSE ONLY - Time Spent with Patient (mins) 30 07/03/22 1300       Wound 06/19/22 Full Thickness Wound Knee Left (Active)   Wound Image    07/03/22 1300   Site Assessment JUDITH 07/06/22 0200   Periwound Assessment Dry;Intact 07/06/22 0200   Margins Defined edges 07/05/22 0800   Closure Secondary intention 07/03/22 1300   Drainage Amount Small 07/03/22 1300   Drainage Description Serosanguineous 07/03/22 1300   Treatments CSWD - Conservative Sharp Wound Debridement;Cleansed;Irrigation;Site care 07/03/22 1300   Wound Cleansing Approved Wound Cleanser 07/03/22 1300   Periwound Protectant Skin Protectant Wipes to Periwound 07/03/22 1300   Dressing Cleansing/Solutions Not Applicable 07/03/22 1300   Dressing Options Hydrofiber Silver;Silicone Adhesive Foam 07/03/22 1300   Dressing Changed New 07/03/22 1300   Dressing Status Clean;Dry;Intact 07/05/22 2000   Dressing Change/Treatment Frequency Daily, and As Needed 07/06/22 0200   NEXT Dressing Change/Treatment Date 07/05/22 07/03/22 1300   NEXT Weekly Photo (Inpatient Only) 07/10/22 07/03/22 1300   Non-staged Wound Description Full thickness 07/03/22 1300   Wound Length (cm) 1.2 cm 07/03/22 1300   Wound Width (cm) 1.2 cm 07/03/22 1300   Wound Depth (cm) 0.3 cm 07/03/22 1300   Wound Surface Area (cm^2) 1.44 cm^2 07/03/22 1300   Wound Volume (cm^3) 0.432 cm^3 07/03/22 1300   Post-Procedure Length (cm) 1.3 cm 07/03/22 1300   Post-Procedure Width  (cm) 1.3 cm 07/03/22 1300   Post-Procedure Depth (cm) 0.6 cm 07/03/22 1300   Post-Procedure Surface Area (cm^2) 1.69 cm^2 07/03/22 1300   Post-Procedure Volume (cm^3) 1.014 cm^3 07/03/22 1300   Shape circular 07/03/22 1300   Wound Odor None 07/03/22 1300   Exposed Structures None 07/03/22 1300   WOUND NURSE ONLY - Time Spent with Patient (mins) 30 07/03/22 1300       Wound 07/03/22 Soft Tissue Necrosis Foot Dorsal;Lateral Right (Active)   Wound Image    07/03/22 1300   Site Assessment JUDITH 07/06/22 0200   Periwound Assessment Clean;Dry;Intact;Scar tissue 07/03/22 1300   Margins Defined edges 07/03/22 1300   Closure Secondary intention 07/03/22 1300   Drainage Amount Small 07/03/22 1300   Drainage Description Serosanguineous 07/03/22 1300   Treatments CSWD - Conservative Sharp Wound Debridement;Cleansed;Irrigation;Site care 07/03/22 1300   Wound Cleansing Approved Wound Cleanser 07/03/22 1300   Periwound Protectant Skin Protectant Wipes to Periwound 07/03/22 1300   Dressing Cleansing/Solutions Not Applicable 07/03/22 1300   Dressing Options Hydrofiber Silver;Silicone Adhesive Foam;Dry Roll Gauze;Tubigrip 07/03/22 1300   Dressing Changed New 07/03/22 1300   Dressing Status Clean;Dry;Intact 07/05/22 2000   Dressing Change/Treatment Frequency Every 48 hrs, and As Needed 07/06/22 0200   NEXT Dressing Change/Treatment Date 07/05/22 07/03/22 2000   NEXT Weekly Photo (Inpatient Only) 07/10/22 07/03/22 1300   Shape irregular 07/03/22 1300   Wound Odor None 07/03/22 1300   Pulses Right;1+;DP;PT 07/03/22 1300   Exposed Structures JUDITH;None 07/03/22 1300   WOUND NURSE ONLY - Time Spent with Patient (mins) 15 07/03/22 1300       Wound 07/03/22 Soft Tissue Necrosis Leg Posterior;Medial Right (Active)   Wound Image   07/03/22 1300   Site Assessment JUDITH 07/06/22 0200   Periwound Assessment Clean;Dry;Intact 07/03/22 1300   Margins Defined edges 07/03/22 1300   Closure Secondary intention 07/03/22 1300   Drainage Amount Small 07/03/22  1300   Drainage Description Serosanguineous 07/03/22 1300   Treatments CSWD - Conservative Sharp Wound Debridement;Cleansed;Irrigation;Site care;Compression 07/03/22 1300   Wound Cleansing Approved Wound Cleanser 07/03/22 1300   Periwound Protectant Skin Protectant Wipes to Periwound 07/03/22 1300   Dressing Cleansing/Solutions Not Applicable 07/03/22 1300   Dressing Options Hydrofiber Silver;Absorbent Abdominal Pad;Dry Roll Gauze;Tubigrip 07/03/22 1300   Dressing Changed New 07/03/22 1300   Dressing Status Clean;Dry;Intact 07/05/22 2000   Dressing Change/Treatment Frequency Every 48 hrs, and As Needed 07/06/22 0200   NEXT Dressing Change/Treatment Date 07/05/22 07/03/22 2000   NEXT Weekly Photo (Inpatient Only) 07/10/22 07/03/22 1300   Non-staged Wound Description Full thickness 07/03/22 1300   Shape irregular 07/03/22 1300   Wound Odor None 07/03/22 1300   Pulses Right;1+;DP;PT 07/03/22 1300   Exposed Structures JUDITH;None 07/03/22 1300   WOUND NURSE ONLY - Time Spent with Patient (mins) 30 07/03/22 1300                  MENTAL STATUS ON TRANSFER         AOx4    CONSULTATIONS  ID    PROCEDURES  Midline  Central line    LABORATORY  Lab Results   Component Value Date    SODIUM 135 07/06/2022    POTASSIUM 4.5 07/06/2022    CHLORIDE 104 07/06/2022    CO2 23 07/06/2022    GLUCOSE 87 07/06/2022    BUN 14 07/06/2022    CREATININE 1.10 07/06/2022    CREATININE 2.4 (H) 12/18/2006        Lab Results   Component Value Date    WBC 8.6 07/06/2022    HEMOGLOBIN 9.0 (L) 07/06/2022    HEMATOCRIT 29.6 (L) 07/06/2022    PLATELETCT 205 07/06/2022        Total time of the discharge process exceeds 35  minutes.

## 2022-07-08 LAB
BACTERIA BLD CULT: NORMAL
BACTERIA BLD CULT: NORMAL
SIGNIFICANT IND 70042: NORMAL
SIGNIFICANT IND 70042: NORMAL
SITE SITE: NORMAL
SITE SITE: NORMAL
SOURCE SOURCE: NORMAL
SOURCE SOURCE: NORMAL

## 2022-07-18 ENCOUNTER — TELEPHONE (OUTPATIENT)
Dept: HEALTH INFORMATION MANAGEMENT | Facility: OTHER | Age: 67
End: 2022-07-18
Payer: MEDICARE

## 2022-07-18 NOTE — TELEPHONE ENCOUNTER
Called to schedule COMPREHENSIVE HEALTH ASSESSMENT. LVM stating I was calling regarding a Preventive Screening.    Please transfer to Member to Outreach Team at [phone] when patient returns call.

## 2022-07-19 ENCOUNTER — TELEPHONE (OUTPATIENT)
Dept: HEALTH INFORMATION MANAGEMENT | Facility: OTHER | Age: 67
End: 2022-07-19
Payer: MEDICARE

## 2022-07-19 NOTE — TELEPHONE ENCOUNTER
PC to Mr. Altman regarding follow up for discharge planning.     Per PC with Mr. Altman, he believes that he will be ready for d/c by next Friday. Overview of TCM program reviewed and TCN contact information provided.

## 2022-07-25 ENCOUNTER — HOSPITAL ENCOUNTER (INPATIENT)
Facility: MEDICAL CENTER | Age: 67
LOS: 7 days | DRG: 698 | End: 2022-08-01
Attending: EMERGENCY MEDICINE | Admitting: STUDENT IN AN ORGANIZED HEALTH CARE EDUCATION/TRAINING PROGRAM
Payer: MEDICARE

## 2022-07-25 ENCOUNTER — HOME HEALTH ADMISSION (OUTPATIENT)
Dept: HOME HEALTH SERVICES | Facility: HOME HEALTHCARE | Age: 67
End: 2022-07-25
Payer: MEDICARE

## 2022-07-25 ENCOUNTER — APPOINTMENT (OUTPATIENT)
Dept: RADIOLOGY | Facility: MEDICAL CENTER | Age: 67
DRG: 698 | End: 2022-07-25
Attending: EMERGENCY MEDICINE
Payer: MEDICARE

## 2022-07-25 DIAGNOSIS — R65.21 SEPTIC SHOCK (HCC): ICD-10-CM

## 2022-07-25 DIAGNOSIS — B96.20 BACTEREMIA DUE TO ESCHERICHIA COLI: ICD-10-CM

## 2022-07-25 DIAGNOSIS — A41.9 SEPTIC SHOCK (HCC): ICD-10-CM

## 2022-07-25 DIAGNOSIS — N39.0 URINARY TRACT INFECTION WITHOUT HEMATURIA, SITE UNSPECIFIED: ICD-10-CM

## 2022-07-25 DIAGNOSIS — I82.622 ACUTE DEEP VEIN THROMBOSIS (DVT) OF OTHER VEIN OF LEFT UPPER EXTREMITY (HCC): ICD-10-CM

## 2022-07-25 DIAGNOSIS — E55.9 VITAMIN D DEFICIENCY: ICD-10-CM

## 2022-07-25 DIAGNOSIS — R78.81 BACTEREMIA DUE TO ESCHERICHIA COLI: ICD-10-CM

## 2022-07-25 DIAGNOSIS — I82.622 ACUTE DEEP VEIN THROMBOSIS (DVT) OF LEFT UPPER EXTREMITY, UNSPECIFIED VEIN (HCC): ICD-10-CM

## 2022-07-25 PROBLEM — D84.9 IMMUNOSUPPRESSED STATUS (HCC): Status: ACTIVE | Noted: 2022-07-25

## 2022-07-25 PROBLEM — Z71.89 ADVANCED CARE PLANNING/COUNSELING DISCUSSION: Status: ACTIVE | Noted: 2021-12-16

## 2022-07-25 LAB
ALBUMIN SERPL BCP-MCNC: 3.9 G/DL (ref 3.2–4.9)
ALBUMIN/GLOB SERPL: 1.3 G/DL
ALP SERPL-CCNC: 103 U/L (ref 30–99)
ALT SERPL-CCNC: 22 U/L (ref 2–50)
ANION GAP SERPL CALC-SCNC: 14 MMOL/L (ref 7–16)
APPEARANCE UR: ABNORMAL
AST SERPL-CCNC: 20 U/L (ref 12–45)
BACTERIA #/AREA URNS HPF: ABNORMAL /HPF
BASOPHILS # BLD AUTO: 0.3 % (ref 0–1.8)
BASOPHILS # BLD: 0.02 K/UL (ref 0–0.12)
BILIRUB SERPL-MCNC: 1 MG/DL (ref 0.1–1.5)
BILIRUB UR QL STRIP.AUTO: NEGATIVE
BUN SERPL-MCNC: 28 MG/DL (ref 8–22)
CALCIUM SERPL-MCNC: 8.8 MG/DL (ref 8.5–10.5)
CHLORIDE SERPL-SCNC: 101 MMOL/L (ref 96–112)
CO2 SERPL-SCNC: 21 MMOL/L (ref 20–33)
COLOR UR: YELLOW
CREAT SERPL-MCNC: 2.03 MG/DL (ref 0.5–1.4)
EOSINOPHIL # BLD AUTO: 0.01 K/UL (ref 0–0.51)
EOSINOPHIL NFR BLD: 0.1 % (ref 0–6.9)
EPI CELLS #/AREA URNS HPF: NEGATIVE /HPF
ERYTHROCYTE [DISTWIDTH] IN BLOOD BY AUTOMATED COUNT: 55.6 FL (ref 35.9–50)
GFR SERPLBLD CREATININE-BSD FMLA CKD-EPI: 36 ML/MIN/1.73 M 2
GLOBULIN SER CALC-MCNC: 2.9 G/DL (ref 1.9–3.5)
GLUCOSE SERPL-MCNC: 100 MG/DL (ref 65–99)
GLUCOSE UR STRIP.AUTO-MCNC: NEGATIVE MG/DL
HCT VFR BLD AUTO: 35.6 % (ref 42–52)
HGB BLD-MCNC: 10.8 G/DL (ref 14–18)
HYALINE CASTS #/AREA URNS LPF: ABNORMAL /LPF
IMM GRANULOCYTES # BLD AUTO: 0.06 K/UL (ref 0–0.11)
IMM GRANULOCYTES NFR BLD AUTO: 0.8 % (ref 0–0.9)
KETONES UR STRIP.AUTO-MCNC: ABNORMAL MG/DL
LACTATE SERPL-SCNC: 2.5 MMOL/L (ref 0.5–2)
LACTATE SERPL-SCNC: 2.8 MMOL/L (ref 0.5–2)
LEUKOCYTE ESTERASE UR QL STRIP.AUTO: ABNORMAL
LYMPHOCYTES # BLD AUTO: 0.31 K/UL (ref 1–4.8)
LYMPHOCYTES NFR BLD: 4 % (ref 22–41)
MCH RBC QN AUTO: 26.3 PG (ref 27–33)
MCHC RBC AUTO-ENTMCNC: 30.3 G/DL (ref 33.7–35.3)
MCV RBC AUTO: 86.6 FL (ref 81.4–97.8)
MICRO URNS: ABNORMAL
MONOCYTES # BLD AUTO: 0.06 K/UL (ref 0–0.85)
MONOCYTES NFR BLD AUTO: 0.8 % (ref 0–13.4)
NEUTROPHILS # BLD AUTO: 7.25 K/UL (ref 1.82–7.42)
NEUTROPHILS NFR BLD: 94 % (ref 44–72)
NITRITE UR QL STRIP.AUTO: NEGATIVE
NRBC # BLD AUTO: 0 K/UL
NRBC BLD-RTO: 0 /100 WBC
PH UR STRIP.AUTO: 5 [PH] (ref 5–8)
PLATELET # BLD AUTO: 123 K/UL (ref 164–446)
PMV BLD AUTO: 11.6 FL (ref 9–12.9)
POTASSIUM SERPL-SCNC: 4.5 MMOL/L (ref 3.6–5.5)
PROT SERPL-MCNC: 6.8 G/DL (ref 6–8.2)
PROT UR QL STRIP: 100 MG/DL
RBC # BLD AUTO: 4.11 M/UL (ref 4.7–6.1)
RBC # URNS HPF: ABNORMAL /HPF
RBC UR QL AUTO: ABNORMAL
SODIUM SERPL-SCNC: 136 MMOL/L (ref 135–145)
SP GR UR STRIP.AUTO: 1.02
UROBILINOGEN UR STRIP.AUTO-MCNC: 0.2 MG/DL
WBC # BLD AUTO: 7.7 K/UL (ref 4.8–10.8)
WBC #/AREA URNS HPF: ABNORMAL /HPF

## 2022-07-25 PROCEDURE — 83605 ASSAY OF LACTIC ACID: CPT | Mod: 91

## 2022-07-25 PROCEDURE — 81001 URINALYSIS AUTO W/SCOPE: CPT

## 2022-07-25 PROCEDURE — 87186 SC STD MICRODIL/AGAR DIL: CPT | Mod: 91

## 2022-07-25 PROCEDURE — 700102 HCHG RX REV CODE 250 W/ 637 OVERRIDE(OP): Performed by: STUDENT IN AN ORGANIZED HEALTH CARE EDUCATION/TRAINING PROGRAM

## 2022-07-25 PROCEDURE — 96375 TX/PRO/DX INJ NEW DRUG ADDON: CPT

## 2022-07-25 PROCEDURE — 85025 COMPLETE CBC W/AUTO DIFF WBC: CPT

## 2022-07-25 PROCEDURE — 99291 CRITICAL CARE FIRST HOUR: CPT

## 2022-07-25 PROCEDURE — 700105 HCHG RX REV CODE 258: Performed by: EMERGENCY MEDICINE

## 2022-07-25 PROCEDURE — A9270 NON-COVERED ITEM OR SERVICE: HCPCS | Performed by: STUDENT IN AN ORGANIZED HEALTH CARE EDUCATION/TRAINING PROGRAM

## 2022-07-25 PROCEDURE — A9270 NON-COVERED ITEM OR SERVICE: HCPCS | Performed by: INTERNAL MEDICINE

## 2022-07-25 PROCEDURE — 700102 HCHG RX REV CODE 250 W/ 637 OVERRIDE(OP): Performed by: INTERNAL MEDICINE

## 2022-07-25 PROCEDURE — 80053 COMPREHEN METABOLIC PANEL: CPT

## 2022-07-25 PROCEDURE — 87077 CULTURE AEROBIC IDENTIFY: CPT | Mod: 91

## 2022-07-25 PROCEDURE — 700117 HCHG RX CONTRAST REV CODE 255: Performed by: EMERGENCY MEDICINE

## 2022-07-25 PROCEDURE — 700102 HCHG RX REV CODE 250 W/ 637 OVERRIDE(OP): Performed by: EMERGENCY MEDICINE

## 2022-07-25 PROCEDURE — 96365 THER/PROPH/DIAG IV INF INIT: CPT

## 2022-07-25 PROCEDURE — 82533 TOTAL CORTISOL: CPT

## 2022-07-25 PROCEDURE — 99291 CRITICAL CARE FIRST HOUR: CPT | Performed by: INTERNAL MEDICINE

## 2022-07-25 PROCEDURE — 770022 HCHG ROOM/CARE - ICU (200)

## 2022-07-25 PROCEDURE — 74177 CT ABD & PELVIS W/CONTRAST: CPT | Mod: MG

## 2022-07-25 PROCEDURE — 36415 COLL VENOUS BLD VENIPUNCTURE: CPT

## 2022-07-25 PROCEDURE — 700105 HCHG RX REV CODE 258: Performed by: STUDENT IN AN ORGANIZED HEALTH CARE EDUCATION/TRAINING PROGRAM

## 2022-07-25 PROCEDURE — 85610 PROTHROMBIN TIME: CPT

## 2022-07-25 PROCEDURE — 87086 URINE CULTURE/COLONY COUNT: CPT

## 2022-07-25 PROCEDURE — 87040 BLOOD CULTURE FOR BACTERIA: CPT

## 2022-07-25 PROCEDURE — A9270 NON-COVERED ITEM OR SERVICE: HCPCS | Performed by: EMERGENCY MEDICINE

## 2022-07-25 PROCEDURE — 96366 THER/PROPH/DIAG IV INF ADDON: CPT

## 2022-07-25 PROCEDURE — 99223 1ST HOSP IP/OBS HIGH 75: CPT | Mod: AI | Performed by: STUDENT IN AN ORGANIZED HEALTH CARE EDUCATION/TRAINING PROGRAM

## 2022-07-25 PROCEDURE — 700101 HCHG RX REV CODE 250: Performed by: STUDENT IN AN ORGANIZED HEALTH CARE EDUCATION/TRAINING PROGRAM

## 2022-07-25 PROCEDURE — 700111 HCHG RX REV CODE 636 W/ 250 OVERRIDE (IP): Performed by: EMERGENCY MEDICINE

## 2022-07-25 PROCEDURE — 71045 X-RAY EXAM CHEST 1 VIEW: CPT

## 2022-07-25 PROCEDURE — 700111 HCHG RX REV CODE 636 W/ 250 OVERRIDE (IP): Performed by: STUDENT IN AN ORGANIZED HEALTH CARE EDUCATION/TRAINING PROGRAM

## 2022-07-25 RX ORDER — MYCOPHENOLATE MOFETIL 500 MG/1
500 TABLET ORAL 2 TIMES DAILY
COMMUNITY
End: 2022-12-06

## 2022-07-25 RX ORDER — SODIUM CHLORIDE, SODIUM LACTATE, POTASSIUM CHLORIDE, AND CALCIUM CHLORIDE .6; .31; .03; .02 G/100ML; G/100ML; G/100ML; G/100ML
500 INJECTION, SOLUTION INTRAVENOUS
Status: COMPLETED | OUTPATIENT
Start: 2022-07-25 | End: 2022-07-26

## 2022-07-25 RX ORDER — HEPARIN SODIUM 5000 [USP'U]/ML
5000 INJECTION, SOLUTION INTRAVENOUS; SUBCUTANEOUS EVERY 8 HOURS
Status: DISCONTINUED | OUTPATIENT
Start: 2022-07-26 | End: 2022-07-26

## 2022-07-25 RX ORDER — ACETAMINOPHEN 325 MG/1
650 TABLET ORAL EVERY 4 HOURS PRN
COMMUNITY
End: 2022-12-06

## 2022-07-25 RX ORDER — MIDODRINE HYDROCHLORIDE 5 MG/1
10 TABLET ORAL
Status: DISCONTINUED | OUTPATIENT
Start: 2022-07-26 | End: 2022-07-26

## 2022-07-25 RX ORDER — ACETAMINOPHEN 325 MG/1
650 TABLET ORAL ONCE
Status: DISCONTINUED | OUTPATIENT
Start: 2022-07-25 | End: 2022-07-25

## 2022-07-25 RX ORDER — MYCOPHENOLATE MOFETIL 250 MG/1
500 CAPSULE ORAL 2 TIMES DAILY
Status: DISCONTINUED | OUTPATIENT
Start: 2022-07-25 | End: 2022-08-01 | Stop reason: HOSPADM

## 2022-07-25 RX ORDER — OMEPRAZOLE 20 MG/1
20 CAPSULE, DELAYED RELEASE ORAL EVERY 12 HOURS
Status: DISCONTINUED | OUTPATIENT
Start: 2022-07-25 | End: 2022-07-27

## 2022-07-25 RX ORDER — PREDNISONE 10 MG/1
5 TABLET ORAL DAILY
Status: DISCONTINUED | OUTPATIENT
Start: 2022-07-26 | End: 2022-07-25

## 2022-07-25 RX ORDER — DEXTROSE MONOHYDRATE 25 G/50ML
25 INJECTION, SOLUTION INTRAVENOUS
Status: DISCONTINUED | OUTPATIENT
Start: 2022-07-25 | End: 2022-07-29

## 2022-07-25 RX ORDER — ATORVASTATIN CALCIUM 40 MG/1
80 TABLET, FILM COATED ORAL
Status: DISCONTINUED | OUTPATIENT
Start: 2022-07-25 | End: 2022-08-01 | Stop reason: HOSPADM

## 2022-07-25 RX ORDER — LINAGLIPTIN 5 MG/1
5 TABLET, FILM COATED ORAL DAILY
Status: SHIPPED | COMMUNITY
End: 2022-07-25

## 2022-07-25 RX ORDER — MEROPENEM 500 MG/1
500 INJECTION, POWDER, FOR SOLUTION INTRAVENOUS EVERY 8 HOURS
Status: ON HOLD | COMMUNITY
Start: 2022-07-06 | End: 2022-08-01

## 2022-07-25 RX ORDER — CEFTRIAXONE 2 G/1
2 INJECTION, POWDER, FOR SOLUTION INTRAMUSCULAR; INTRAVENOUS ONCE
Status: COMPLETED | OUTPATIENT
Start: 2022-07-25 | End: 2022-07-25

## 2022-07-25 RX ORDER — SODIUM CHLORIDE, SODIUM LACTATE, POTASSIUM CHLORIDE, AND CALCIUM CHLORIDE .6; .31; .03; .02 G/100ML; G/100ML; G/100ML; G/100ML
30 INJECTION, SOLUTION INTRAVENOUS ONCE
Status: COMPLETED | OUTPATIENT
Start: 2022-07-25 | End: 2022-07-25

## 2022-07-25 RX ORDER — ACETAMINOPHEN 325 MG/1
650 TABLET ORAL EVERY 6 HOURS PRN
Status: DISCONTINUED | OUTPATIENT
Start: 2022-07-25 | End: 2022-08-01 | Stop reason: HOSPADM

## 2022-07-25 RX ORDER — IBUPROFEN 600 MG/1
600 TABLET ORAL ONCE
Status: COMPLETED | OUTPATIENT
Start: 2022-07-25 | End: 2022-07-25

## 2022-07-25 RX ORDER — AMINO ACIDS/PROTEIN HYDROLYS 15G-100/30
30 LIQUID (ML) ORAL DAILY
COMMUNITY
End: 2022-08-02

## 2022-07-25 RX ORDER — MIDODRINE HYDROCHLORIDE 5 MG/1
5 TABLET ORAL EVERY 8 HOURS PRN
Status: ON HOLD | COMMUNITY
End: 2022-08-01

## 2022-07-25 RX ORDER — CYCLOBENZAPRINE HCL 5 MG
5-10 TABLET ORAL 3 TIMES DAILY PRN
COMMUNITY
End: 2022-08-02

## 2022-07-25 RX ORDER — ACETAMINOPHEN 500 MG
1000 TABLET ORAL ONCE
Status: COMPLETED | OUTPATIENT
Start: 2022-07-25 | End: 2022-07-25

## 2022-07-25 RX ORDER — MIDODRINE HYDROCHLORIDE 5 MG/1
10 TABLET ORAL EVERY 8 HOURS PRN
Status: DISCONTINUED | OUTPATIENT
Start: 2022-07-25 | End: 2022-07-25

## 2022-07-25 RX ORDER — TACROLIMUS 1 MG/1
1 CAPSULE ORAL 2 TIMES DAILY
Status: DISCONTINUED | OUTPATIENT
Start: 2022-07-25 | End: 2022-08-01 | Stop reason: HOSPADM

## 2022-07-25 RX ORDER — CARVEDILOL 3.12 MG/1
3.12 TABLET ORAL 2 TIMES DAILY WITH MEALS
Status: DISCONTINUED | OUTPATIENT
Start: 2022-07-25 | End: 2022-08-01 | Stop reason: HOSPADM

## 2022-07-25 RX ORDER — SODIUM CHLORIDE, SODIUM LACTATE, POTASSIUM CHLORIDE, CALCIUM CHLORIDE 600; 310; 30; 20 MG/100ML; MG/100ML; MG/100ML; MG/100ML
INJECTION, SOLUTION INTRAVENOUS CONTINUOUS
Status: DISCONTINUED | OUTPATIENT
Start: 2022-07-25 | End: 2022-07-27

## 2022-07-25 RX ORDER — NOREPINEPHRINE BITARTRATE 0.03 MG/ML
.5-3 INJECTION, SOLUTION INTRAVENOUS CONTINUOUS
Status: DISCONTINUED | OUTPATIENT
Start: 2022-07-26 | End: 2022-07-27

## 2022-07-25 RX ORDER — GABAPENTIN 300 MG/1
600 CAPSULE ORAL
Status: DISCONTINUED | OUTPATIENT
Start: 2022-07-25 | End: 2022-08-01 | Stop reason: HOSPADM

## 2022-07-25 RX ORDER — NOREPINEPHRINE BITARTRATE 0.03 MG/ML
0-30 INJECTION, SOLUTION INTRAVENOUS CONTINUOUS
Status: DISCONTINUED | OUTPATIENT
Start: 2022-07-25 | End: 2022-07-25

## 2022-07-25 RX ORDER — CARVEDILOL 3.12 MG/1
3.12 TABLET ORAL 2 TIMES DAILY
COMMUNITY
End: 2022-08-03 | Stop reason: SDUPTHER

## 2022-07-25 RX ADMIN — CEFTRIAXONE SODIUM 2 G: 2 INJECTION, POWDER, FOR SOLUTION INTRAMUSCULAR; INTRAVENOUS at 21:12

## 2022-07-25 RX ADMIN — NOREPINEPHRINE BITARTRATE 10 MCG/MIN: 1 INJECTION INTRAVENOUS at 22:44

## 2022-07-25 RX ADMIN — SODIUM CHLORIDE, POTASSIUM CHLORIDE, SODIUM LACTATE AND CALCIUM CHLORIDE 2763 ML: 600; 310; 30; 20 INJECTION, SOLUTION INTRAVENOUS at 19:49

## 2022-07-25 RX ADMIN — IBUPROFEN 600 MG: 600 TABLET, FILM COATED ORAL at 20:00

## 2022-07-25 RX ADMIN — SODIUM CHLORIDE, POTASSIUM CHLORIDE, SODIUM LACTATE AND CALCIUM CHLORIDE 2763 ML: 600; 310; 30; 20 INJECTION, SOLUTION INTRAVENOUS at 20:00

## 2022-07-25 RX ADMIN — TACROLIMUS 1 MG: 1 CAPSULE ORAL at 23:00

## 2022-07-25 RX ADMIN — SODIUM CHLORIDE, POTASSIUM CHLORIDE, SODIUM LACTATE AND CALCIUM CHLORIDE 2763 ML: 600; 310; 30; 20 INJECTION, SOLUTION INTRAVENOUS at 21:15

## 2022-07-25 RX ADMIN — GABAPENTIN 600 MG: 300 CAPSULE ORAL at 23:00

## 2022-07-25 RX ADMIN — ACETAMINOPHEN 1000 MG: 500 TABLET ORAL at 20:00

## 2022-07-25 RX ADMIN — ATORVASTATIN CALCIUM 80 MG: 80 TABLET, FILM COATED ORAL at 22:30

## 2022-07-25 RX ADMIN — IOHEXOL 95 ML: 350 INJECTION, SOLUTION INTRAVENOUS at 22:15

## 2022-07-25 RX ADMIN — MYCOPHENOLATE MOFETIL 500 MG: 250 CAPSULE ORAL at 23:00

## 2022-07-25 RX ADMIN — OMEPRAZOLE 20 MG: 20 CAPSULE, DELAYED RELEASE ORAL at 23:45

## 2022-07-25 ASSESSMENT — CHA2DS2 SCORE
HYPERTENSION: YES
CHA2DS2 VASC SCORE: 5
PRIOR STROKE OR TIA OR THROMBOEMBOLISM: NO
AGE 65 TO 74: YES
VASCULAR DISEASE: YES
SEX: MALE
DIABETES: YES
AGE 75 OR GREATER: NO
CHF OR LEFT VENTRICULAR DYSFUNCTION: YES

## 2022-07-25 ASSESSMENT — ENCOUNTER SYMPTOMS
PSYCHIATRIC NEGATIVE: 1
RESPIRATORY NEGATIVE: 1
WEAKNESS: 1
GASTROINTESTINAL NEGATIVE: 1
CHILLS: 1
EYES NEGATIVE: 1
CARDIOVASCULAR NEGATIVE: 1
MUSCULOSKELETAL NEGATIVE: 1

## 2022-07-25 ASSESSMENT — FIBROSIS 4 INDEX: FIB4 SCORE: 1.11

## 2022-07-26 PROBLEM — E27.49 SECONDARY ADRENAL INSUFFICIENCY (HCC): Status: ACTIVE | Noted: 2022-07-26

## 2022-07-26 LAB
25(OH)D3 SERPL-MCNC: 27 NG/ML (ref 30–100)
ALBUMIN SERPL BCP-MCNC: 2.7 G/DL (ref 3.2–4.9)
ALBUMIN SERPL BCP-MCNC: NORMAL G/DL (ref 3.2–4.9)
ALBUMIN/GLOB SERPL: 1.1 G/DL
ALBUMIN/GLOB SERPL: NORMAL G/DL
ALP SERPL-CCNC: 93 U/L (ref 30–99)
ALP SERPL-CCNC: NORMAL U/L (ref 30–99)
ALT SERPL-CCNC: 18 U/L (ref 2–50)
ALT SERPL-CCNC: NORMAL U/L (ref 2–50)
ANION GAP SERPL CALC-SCNC: 10 MMOL/L (ref 7–16)
ANION GAP SERPL CALC-SCNC: 11 MMOL/L (ref 7–16)
ANION GAP SERPL CALC-SCNC: 12 MMOL/L (ref 7–16)
ANION GAP SERPL CALC-SCNC: NORMAL MMOL/L (ref 7–16)
AST SERPL-CCNC: 19 U/L (ref 12–45)
AST SERPL-CCNC: NORMAL U/L (ref 12–45)
BILIRUB SERPL-MCNC: 0.6 MG/DL (ref 0.1–1.5)
BILIRUB SERPL-MCNC: NORMAL MG/DL (ref 0.1–1.5)
BUN SERPL-MCNC: 27 MG/DL (ref 8–22)
BUN SERPL-MCNC: 28 MG/DL (ref 8–22)
BUN SERPL-MCNC: 29 MG/DL (ref 8–22)
BUN SERPL-MCNC: NORMAL MG/DL (ref 8–22)
CALCIUM SERPL-MCNC: 7.9 MG/DL (ref 8.5–10.5)
CALCIUM SERPL-MCNC: 8.1 MG/DL (ref 8.5–10.5)
CALCIUM SERPL-MCNC: 8.2 MG/DL (ref 8.5–10.5)
CALCIUM SERPL-MCNC: 8.4 MG/DL (ref 8.5–10.5)
CHLORIDE SERPL-SCNC: 101 MMOL/L (ref 96–112)
CHLORIDE SERPL-SCNC: 102 MMOL/L (ref 96–112)
CHLORIDE SERPL-SCNC: 103 MMOL/L (ref 96–112)
CHLORIDE SERPL-SCNC: NORMAL MMOL/L (ref 96–112)
CO2 SERPL-SCNC: 20 MMOL/L (ref 20–33)
CO2 SERPL-SCNC: 21 MMOL/L (ref 20–33)
CO2 SERPL-SCNC: 22 MMOL/L (ref 20–33)
CO2 SERPL-SCNC: NORMAL MMOL/L (ref 20–33)
CORTIS SERPL-MCNC: 10.3 UG/DL (ref 0–23)
CREAT SERPL-MCNC: 1.68 MG/DL (ref 0.5–1.4)
CREAT SERPL-MCNC: 1.8 MG/DL (ref 0.5–1.4)
CREAT SERPL-MCNC: 2.04 MG/DL (ref 0.5–1.4)
CREAT SERPL-MCNC: NORMAL MG/DL (ref 0.5–1.4)
EKG IMPRESSION: NORMAL
EST. AVERAGE GLUCOSE BLD GHB EST-MCNC: 131 MG/DL
GFR SERPLBLD CREATININE-BSD FMLA CKD-EPI: 35 ML/MIN/1.73 M 2
GFR SERPLBLD CREATININE-BSD FMLA CKD-EPI: 41 ML/MIN/1.73 M 2
GFR SERPLBLD CREATININE-BSD FMLA CKD-EPI: 45 ML/MIN/1.73 M 2
GFR SERPLBLD CREATININE-BSD FMLA CKD-EPI: NORMAL ML/MIN/1.73 M 2
GLOBULIN SER CALC-MCNC: 2.4 G/DL (ref 1.9–3.5)
GLOBULIN SER CALC-MCNC: NORMAL G/DL (ref 1.9–3.5)
GLUCOSE BLD STRIP.AUTO-MCNC: 109 MG/DL (ref 65–99)
GLUCOSE BLD STRIP.AUTO-MCNC: 116 MG/DL (ref 65–99)
GLUCOSE BLD STRIP.AUTO-MCNC: 266 MG/DL (ref 65–99)
GLUCOSE BLD STRIP.AUTO-MCNC: 269 MG/DL (ref 65–99)
GLUCOSE BLD STRIP.AUTO-MCNC: 278 MG/DL (ref 65–99)
GLUCOSE BLD STRIP.AUTO-MCNC: 48 MG/DL (ref 65–99)
GLUCOSE BLD STRIP.AUTO-MCNC: 83 MG/DL (ref 65–99)
GLUCOSE SERPL-MCNC: 108 MG/DL (ref 65–99)
GLUCOSE SERPL-MCNC: 310 MG/DL (ref 65–99)
GLUCOSE SERPL-MCNC: 56 MG/DL (ref 65–99)
GLUCOSE SERPL-MCNC: NORMAL MG/DL (ref 65–99)
HBA1C MFR BLD: 6.2 % (ref 4–5.6)
INR PPP: 1.38 (ref 0.87–1.13)
LACTATE SERPL-SCNC: 1.1 MMOL/L (ref 0.5–2)
LACTATE SERPL-SCNC: 1.7 MMOL/L (ref 0.5–2)
LACTATE SERPL-SCNC: NORMAL MMOL/L (ref 0.5–2)
MAGNESIUM SERPL-MCNC: 1.2 MG/DL (ref 1.5–2.5)
MAGNESIUM SERPL-MCNC: 1.3 MG/DL (ref 1.5–2.5)
MAGNESIUM SERPL-MCNC: 2.2 MG/DL (ref 1.5–2.5)
MAGNESIUM SERPL-MCNC: NORMAL MG/DL (ref 1.5–2.5)
PHOSPHATE SERPL-MCNC: 2.7 MG/DL (ref 2.5–4.5)
PHOSPHATE SERPL-MCNC: 2.8 MG/DL (ref 2.5–4.5)
POTASSIUM SERPL-SCNC: 4.3 MMOL/L (ref 3.6–5.5)
POTASSIUM SERPL-SCNC: 4.7 MMOL/L (ref 3.6–5.5)
POTASSIUM SERPL-SCNC: 5 MMOL/L (ref 3.6–5.5)
POTASSIUM SERPL-SCNC: NORMAL MMOL/L (ref 3.6–5.5)
PROT SERPL-MCNC: 5.1 G/DL (ref 6–8.2)
PROT SERPL-MCNC: NORMAL G/DL (ref 6–8.2)
PROTHROMBIN TIME: 16.8 SEC (ref 12–14.6)
PTH-INTACT SERPL-MCNC: 44.4 PG/ML (ref 14–72)
SODIUM SERPL-SCNC: 133 MMOL/L (ref 135–145)
SODIUM SERPL-SCNC: 133 MMOL/L (ref 135–145)
SODIUM SERPL-SCNC: 136 MMOL/L (ref 135–145)
SODIUM SERPL-SCNC: NORMAL MMOL/L (ref 135–145)

## 2022-07-26 PROCEDURE — 700102 HCHG RX REV CODE 250 W/ 637 OVERRIDE(OP): Performed by: INTERNAL MEDICINE

## 2022-07-26 PROCEDURE — 84100 ASSAY OF PHOSPHORUS: CPT

## 2022-07-26 PROCEDURE — 87040 BLOOD CULTURE FOR BACTERIA: CPT | Mod: 91

## 2022-07-26 PROCEDURE — 770000 HCHG ROOM/CARE - INTERMEDIATE ICU *

## 2022-07-26 PROCEDURE — A9270 NON-COVERED ITEM OR SERVICE: HCPCS | Performed by: STUDENT IN AN ORGANIZED HEALTH CARE EDUCATION/TRAINING PROGRAM

## 2022-07-26 PROCEDURE — 700101 HCHG RX REV CODE 250: Performed by: INTERNAL MEDICINE

## 2022-07-26 PROCEDURE — 700111 HCHG RX REV CODE 636 W/ 250 OVERRIDE (IP): Performed by: INTERNAL MEDICINE

## 2022-07-26 PROCEDURE — 83735 ASSAY OF MAGNESIUM: CPT

## 2022-07-26 PROCEDURE — 93010 ELECTROCARDIOGRAM REPORT: CPT | Performed by: INTERNAL MEDICINE

## 2022-07-26 PROCEDURE — A9270 NON-COVERED ITEM OR SERVICE: HCPCS | Performed by: INTERNAL MEDICINE

## 2022-07-26 PROCEDURE — 80048 BASIC METABOLIC PNL TOTAL CA: CPT

## 2022-07-26 PROCEDURE — 700105 HCHG RX REV CODE 258: Performed by: STUDENT IN AN ORGANIZED HEALTH CARE EDUCATION/TRAINING PROGRAM

## 2022-07-26 PROCEDURE — 80053 COMPREHEN METABOLIC PANEL: CPT | Mod: 91

## 2022-07-26 PROCEDURE — 700105 HCHG RX REV CODE 258: Performed by: INTERNAL MEDICINE

## 2022-07-26 PROCEDURE — 82306 VITAMIN D 25 HYDROXY: CPT

## 2022-07-26 PROCEDURE — 700105 HCHG RX REV CODE 258: Performed by: HOSPITALIST

## 2022-07-26 PROCEDURE — 83970 ASSAY OF PARATHORMONE: CPT

## 2022-07-26 PROCEDURE — 83036 HEMOGLOBIN GLYCOSYLATED A1C: CPT

## 2022-07-26 PROCEDURE — 700102 HCHG RX REV CODE 250 W/ 637 OVERRIDE(OP): Performed by: STUDENT IN AN ORGANIZED HEALTH CARE EDUCATION/TRAINING PROGRAM

## 2022-07-26 PROCEDURE — 93005 ELECTROCARDIOGRAM TRACING: CPT | Performed by: INTERNAL MEDICINE

## 2022-07-26 PROCEDURE — 82962 GLUCOSE BLOOD TEST: CPT

## 2022-07-26 PROCEDURE — 700111 HCHG RX REV CODE 636 W/ 250 OVERRIDE (IP): Performed by: STUDENT IN AN ORGANIZED HEALTH CARE EDUCATION/TRAINING PROGRAM

## 2022-07-26 PROCEDURE — 99223 1ST HOSP IP/OBS HIGH 75: CPT | Performed by: INTERNAL MEDICINE

## 2022-07-26 PROCEDURE — 83605 ASSAY OF LACTIC ACID: CPT

## 2022-07-26 PROCEDURE — 99233 SBSQ HOSP IP/OBS HIGH 50: CPT | Performed by: HOSPITALIST

## 2022-07-26 RX ORDER — MAGNESIUM SULFATE HEPTAHYDRATE 40 MG/ML
2 INJECTION, SOLUTION INTRAVENOUS ONCE
Status: COMPLETED | OUTPATIENT
Start: 2022-07-26 | End: 2022-07-26

## 2022-07-26 RX ORDER — CALCIUM CARBONATE 500 MG/1
500 TABLET, CHEWABLE ORAL 2 TIMES DAILY
Status: DISCONTINUED | OUTPATIENT
Start: 2022-07-26 | End: 2022-07-27

## 2022-07-26 RX ORDER — MIDODRINE HYDROCHLORIDE 5 MG/1
10 TABLET ORAL EVERY 8 HOURS
Status: DISCONTINUED | OUTPATIENT
Start: 2022-07-26 | End: 2022-07-27

## 2022-07-26 RX ADMIN — MIDODRINE HYDROCHLORIDE 10 MG: 5 TABLET ORAL at 01:02

## 2022-07-26 RX ADMIN — SODIUM CHLORIDE, POTASSIUM CHLORIDE, SODIUM LACTATE AND CALCIUM CHLORIDE: 600; 310; 30; 20 INJECTION, SOLUTION INTRAVENOUS at 03:30

## 2022-07-26 RX ADMIN — INSULIN HUMAN 7 UNITS: 100 INJECTION, SOLUTION PARENTERAL at 13:27

## 2022-07-26 RX ADMIN — ATORVASTATIN CALCIUM 80 MG: 80 TABLET, FILM COATED ORAL at 21:05

## 2022-07-26 RX ADMIN — TACROLIMUS 1 MG: 1 CAPSULE ORAL at 05:37

## 2022-07-26 RX ADMIN — HYDROCORTISONE SODIUM SUCCINATE 100 MG: 100 INJECTION, POWDER, FOR SOLUTION INTRAMUSCULAR; INTRAVENOUS at 21:04

## 2022-07-26 RX ADMIN — DEXTROSE MONOHYDRATE 25 G: 25 INJECTION, SOLUTION INTRAVENOUS at 01:16

## 2022-07-26 RX ADMIN — SODIUM CHLORIDE, POTASSIUM CHLORIDE, SODIUM LACTATE AND CALCIUM CHLORIDE 500 ML: 600; 310; 30; 20 INJECTION, SOLUTION INTRAVENOUS at 02:00

## 2022-07-26 RX ADMIN — MAGNESIUM SULFATE HEPTAHYDRATE 2 G: 40 INJECTION, SOLUTION INTRAVENOUS at 03:20

## 2022-07-26 RX ADMIN — HYDROCORTISONE SODIUM SUCCINATE 100 MG: 100 INJECTION, POWDER, FOR SOLUTION INTRAMUSCULAR; INTRAVENOUS at 13:29

## 2022-07-26 RX ADMIN — OMEPRAZOLE 20 MG: 20 CAPSULE, DELAYED RELEASE ORAL at 18:22

## 2022-07-26 RX ADMIN — HYDROCORTISONE SODIUM SUCCINATE 100 MG: 100 INJECTION, POWDER, FOR SOLUTION INTRAMUSCULAR; INTRAVENOUS at 00:55

## 2022-07-26 RX ADMIN — MIDODRINE HYDROCHLORIDE 10 MG: 5 TABLET ORAL at 21:05

## 2022-07-26 RX ADMIN — MYCOPHENOLATE MOFETIL 500 MG: 250 CAPSULE ORAL at 18:22

## 2022-07-26 RX ADMIN — GABAPENTIN 600 MG: 300 CAPSULE ORAL at 21:05

## 2022-07-26 RX ADMIN — HEPARIN SODIUM 5000 UNITS: 5000 INJECTION, SOLUTION INTRAVENOUS; SUBCUTANEOUS at 05:47

## 2022-07-26 RX ADMIN — OMEPRAZOLE 20 MG: 20 CAPSULE, DELAYED RELEASE ORAL at 05:36

## 2022-07-26 RX ADMIN — APIXABAN 5 MG: 5 TABLET, FILM COATED ORAL at 18:22

## 2022-07-26 RX ADMIN — MYCOPHENOLATE MOFETIL 500 MG: 250 CAPSULE ORAL at 05:36

## 2022-07-26 RX ADMIN — MEROPENEM 500 MG: 500 INJECTION, POWDER, FOR SOLUTION INTRAVENOUS at 18:23

## 2022-07-26 RX ADMIN — MEROPENEM 500 MG: 500 INJECTION, POWDER, FOR SOLUTION INTRAVENOUS at 01:12

## 2022-07-26 RX ADMIN — TACROLIMUS 1 MG: 1 CAPSULE ORAL at 18:23

## 2022-07-26 RX ADMIN — HYDROCORTISONE SODIUM SUCCINATE 100 MG: 100 INJECTION, POWDER, FOR SOLUTION INTRAMUSCULAR; INTRAVENOUS at 05:36

## 2022-07-26 RX ADMIN — MIDODRINE HYDROCHLORIDE 10 MG: 5 TABLET ORAL at 13:29

## 2022-07-26 RX ADMIN — MAGNESIUM SULFATE HEPTAHYDRATE 2 G: 40 INJECTION, SOLUTION INTRAVENOUS at 08:13

## 2022-07-26 RX ADMIN — MEROPENEM 500 MG: 500 INJECTION, POWDER, FOR SOLUTION INTRAVENOUS at 05:46

## 2022-07-26 RX ADMIN — INSULIN HUMAN 7 UNITS: 100 INJECTION, SOLUTION PARENTERAL at 18:40

## 2022-07-26 RX ADMIN — SODIUM CHLORIDE, POTASSIUM CHLORIDE, SODIUM LACTATE AND CALCIUM CHLORIDE: 600; 310; 30; 20 INJECTION, SOLUTION INTRAVENOUS at 18:36

## 2022-07-26 RX ADMIN — SODIUM CHLORIDE, POTASSIUM CHLORIDE, SODIUM LACTATE AND CALCIUM CHLORIDE: 600; 310; 30; 20 INJECTION, SOLUTION INTRAVENOUS at 11:22

## 2022-07-26 RX ADMIN — MIDODRINE HYDROCHLORIDE 10 MG: 5 TABLET ORAL at 05:36

## 2022-07-26 RX ADMIN — CALCIUM CARBONATE 500 MG: 500 TABLET, CHEWABLE ORAL at 10:16

## 2022-07-26 ASSESSMENT — ENCOUNTER SYMPTOMS
NAUSEA: 1
EYE DISCHARGE: 0
STRIDOR: 0
SPUTUM PRODUCTION: 0
PALPITATIONS: 0
MYALGIAS: 0
ROS SKIN COMMENTS: LE WOUNDS
BACK PAIN: 0
SPEECH CHANGE: 0
COUGH: 0
BLURRED VISION: 0
SHORTNESS OF BREATH: 0
NAUSEA: 0
FOCAL WEAKNESS: 0
DIARRHEA: 0
SENSORY CHANGE: 0
FEVER: 1
VOMITING: 1
CHILLS: 0
DIZZINESS: 0
VOMITING: 0
ABDOMINAL PAIN: 0
FEVER: 0
NERVOUS/ANXIOUS: 0

## 2022-07-26 ASSESSMENT — PATIENT HEALTH QUESTIONNAIRE - PHQ9
1. LITTLE INTEREST OR PLEASURE IN DOING THINGS: NOT AT ALL
2. FEELING DOWN, DEPRESSED, IRRITABLE, OR HOPELESS: NOT AT ALL
1. LITTLE INTEREST OR PLEASURE IN DOING THINGS: NOT AT ALL
SUM OF ALL RESPONSES TO PHQ9 QUESTIONS 1 AND 2: 0
SUM OF ALL RESPONSES TO PHQ9 QUESTIONS 1 AND 2: 0
2. FEELING DOWN, DEPRESSED, IRRITABLE, OR HOPELESS: NOT AT ALL

## 2022-07-26 ASSESSMENT — COGNITIVE AND FUNCTIONAL STATUS - GENERAL
MOBILITY SCORE: 22
DAILY ACTIVITIY SCORE: 23
SUGGESTED CMS G CODE MODIFIER MOBILITY: CJ
DRESSING REGULAR LOWER BODY CLOTHING: A LITTLE
WALKING IN HOSPITAL ROOM: A LITTLE
CLIMB 3 TO 5 STEPS WITH RAILING: A LITTLE
SUGGESTED CMS G CODE MODIFIER DAILY ACTIVITY: CI

## 2022-07-26 ASSESSMENT — PAIN DESCRIPTION - PAIN TYPE: TYPE: ACUTE PAIN

## 2022-07-26 ASSESSMENT — FIBROSIS 4 INDEX: FIB4 SCORE: 2.25

## 2022-07-26 NOTE — ED NOTES
"Patient medicated per MAR, tolerated well.  Levo titrated down to 8mcg    /58   Pulse (!) 122   Temp (!) 38.5 °C (101.3 °F) (Oral)   Resp 20   Ht 1.969 m (6' 5.5\")   Wt 92.1 kg (203 lb)   SpO2 97%     "

## 2022-07-26 NOTE — PROGRESS NOTES
Noted to have blood pressure 80/40.  Heart rate 110.  Patient has already received about 3 L of fluids in the ED.  Patient at bedside denies any chest pain dizziness fatigue.    Restarted patient's midodrine.  Started patient on peripheral Levophed.    Intensivist consulted for likely septic shock secondary to UTI.

## 2022-07-26 NOTE — CONSULTS
"[facility] -  CONSULTATION NOTE               Author: Roderick Ramirez M.D. Date & Time: 7/26/2022  9:20 AM       REASON FOR CONSULTATION:   Renal transplant history    CHIEF COMPLAINT:   \"Sepsis\"    HISTORY OF PRESENT ILLNESS:    Patient is a 66 year old male with a PMHx of HTN, polycystic kidney disease with ESRD s/p renal [place] [facility] [place], HLD, DM, and recent ESBL e coli bacteremia here for rigors and fatigue.  Had dysuria with decreased UOP.  Was admitted to ICU and started on pressors for hypotension.  Started on IV abx.   Found to have GNR on blood cultures.  This morning off of pressors.  Feels better.  No pain or SOB.  Dysuria resolving.  Creatinine down to 1.8.      REVIEW OF SYSTEMS:    10 point ROS was performed and is as per HPI or otherwise negative    PAST MEDICAL HISTORY:   Past Medical History:   Diagnosis Date   • Benign essential hypertension    • Hyperlipoproteinemia    • Hypertension     not on meds anymore   • Pain    • Polycystic kidney 9/10/10    RIGHT KIDNEY TRANSPLANT   • Sleep apnea    • Snoring        PAST SURGICAL HISTORY:   Past Surgical History:   Procedure Laterality Date   • KNEE MANIPULATION  2/16/2012    Performed by LATOYA CONNER at SURGERY [facility] ORS   • KNEE UNICOMPARTMENTAL  12/23/2011    Performed by LATOYA CONNER at SURGERY [facility] ORS   • KNEE ARTHROSCOPY  12/23/2011    Performed by LATOYA CONNER at SURGERY [facility] ORS   • KNEE ARTHROSCOPY  5/3/2011    Performed by HANANE GOLDMAN at SURGERY SAME DAY [facility] ORS   • MENISCECTOMY, KNEE, MEDIAL  5/3/2011    Performed by HANANE GOLDMAN at SURGERY SAME DAY [facility] ORS   • OTHER  9/10/10    RIGHT KIDNEY TRANSPLANT   • KNEE ARTHROPLASTY TOTAL  1/12/07    RIGHT   • KNEE ARTHROSCOPY  4/10/06    RIGHT   • OTHER ORTHOPEDIC SURGERY  7/8/74    LEFT KNEE DEBRIDEMENT       FAMILY HISTORY:   No family history on file.    SOCIAL HISTORY:   Social History     Tobacco Use   Smoking " "Status Never Smoker   Smokeless Tobacco Never Used     Social History     Substance and Sexual Activity   Alcohol Use No     Social History     Substance and Sexual Activity   Drug Use No       HOME MEDICATIONS:   Reviewed and documented in chart    LABORATORY STUDIES:   Recent Labs     07/25/22  1943 07/26/22  0031 07/26/22  0425   SODIUM 136 136 133*   POTASSIUM 4.5 4.3 4.7   CHLORIDE 101 102 103   CO2 21 22 20   GLUCOSE 100* 56* 108*   BUN 28* 27* 28*   CREATININE 2.03* 2.04* 1.80*   CALCIUM 8.8 8.1* 7.9*       ALLERGIES:  Doxycycline    VS:  /63   Pulse 71   Temp 36.1 °C (97 °F)   Resp (!) 25   Ht 1.969 m (6' 5.5\")   Wt 89.1 kg (196 lb 6.9 oz)   SpO2 98%   BMI 22.99 kg/m²     Physical Exam  Constitutional:       Appearance: He is ill-appearing.   HENT:      Head: Normocephalic.      Right Ear: External ear normal.      Left Ear: External ear normal.      Nose: Nose normal.      Mouth/Throat:      Mouth: Mucous membranes are dry.   Eyes:      General:         Right eye: No discharge.         Left eye: No discharge.   Cardiovascular:      Pulses: Normal pulses.   Abdominal:      General: Abdomen is flat.      Palpations: Abdomen is soft.   Musculoskeletal:         General: No swelling.      Cervical back: Normal range of motion.   Skin:     General: Skin is warm.   Neurological:      General: No focal deficit present.      Mental Status: He is alert.   Psychiatric:         Mood and Affect: Mood normal.         FLUID BALANCE:  In: 9621.9 [P.O.:220; I.V.:466.2]  Out: 1050     IMAGING:  All imaging reviewed from admission to present day    IMPRESSION:  # SANKET    - Likely multifactorial with sepsis and pre-renal etiologies    - Improving  # Renal Transplant    - [place] [facility] [place]    - On [place] meds, being continued  # Bacteremia    - On Meropenem    - Cultures pending  # Septic Shock  # UTI  # Right lower extremity wound  # PAF  # DM  # GERD  # Thrombocytopenia  # CKD-MBD    - " Hypocalcemia    - Phos 2.8    - Check vitamin D and PTH  # Anemia  # Hyponatremia    - Mild, monitor      PLAN:  - No compelling indication for RRT  - SANKET resolving  - Continue home IS meds, on stress dose steroids  - Started TUMS BID  - Abx per primary team  - Check vitamin D and PTH  - Dose all meds per eGFR     Thank you for the consultation!

## 2022-07-26 NOTE — ASSESSMENT & PLAN NOTE
On tacrolimus, Prograf and daily prednisone  Concurrently poorly controlled diabetes mellitus  With recurrent gram-negative bacteremia and urinary tract infections

## 2022-07-26 NOTE — ASSESSMENT & PLAN NOTE
CT scan without signs of obstruction or perinephric inflammation  History of ESBL E. Coli  Continue meropenem  Consult [facility] in the AM for further Abx recommendations

## 2022-07-26 NOTE — THERAPY
Physical Therapy Contact Note    PT consult received and acknowledged. Per RN patient not medically appropriate for PT today. Will re attempt as able and appropriate.    Bonita Nur, PT, DPT  [phone]

## 2022-07-26 NOTE — ASSESSMENT & PLAN NOTE
Continue prograf and cellcept   home prednisone 5mg dose 7/28  Nephrology consulting.  Monitor labs.

## 2022-07-26 NOTE — ED NOTES
Patient hypotensive as shown in VS, Dr. Winters contacted and to re eval patient.  Patient remains AAO4, GCS 15, and denies any effects of hypotension at this time.  Patient states that this has happened before when he has been here.

## 2022-07-26 NOTE — ASSESSMENT & PLAN NOTE
This is Septic shock Present on admission  SIRS criteria identified on my evaluation include: Fever, with temperature greater than 101 deg F, Tachycardia, with heart rate greater than 90 BPM and Tachypnea, with respirations greater than 20 per minute  Indicators of septic shock include: Severe sepsis present and persistent hypotension after 30 ml/kg completed.   Sources is: UTI  Sepsis protocol initiated  Crystalloid Fluid Administration: Fluid resuscitation ordered per standard protocol - 30 mL/kg per current or ideal body weight  IV antibiotics as appropriate for source of sepsis  Reassessment: I have reassessed the patient's hemodynamic status  Stress dose steroids given likely secondary adrenal insufficiency due to chronic steroid use  PRN IVF bolus to maintain MAP >65 mmHg  Pressors if needed to maintain MAP >65 mmHg  F/u blood, respiratory and urine cultures  lactate every 4 hours until normalized or downtrending

## 2022-07-26 NOTE — PROGRESS NOTES
Patient is in sinus bradycardia to sinus arrythmia, HR 49s-56, BBB. Dr. Avendano updated, no new order received.

## 2022-07-26 NOTE — PROGRESS NOTES
Report given to Kayla on [facility]. Call questions answered. No concerns. Rn aware unable to get Blood cultures in unit and notified phlebotomy will need to be called. .

## 2022-07-26 NOTE — ASSESSMENT & PLAN NOTE
Currently in sinus rhythm on telemetry  Hold Coreg given hypotension  Will hold Eliquis given SANKET, restart when able

## 2022-07-26 NOTE — CONSULTS
INFECTIOUS DISEASES INPATIENT CONSULT NOTE     Date of Service: 7/26/2022    Consult Requested By: Radhames De Leon MD    Reason for Consultation: Septic shock, gram-negative bacteremia    History of Present Illness:   Jama Altman is a 66 y.o. Man with a history of hypertension, polycystic kidney disease leading to end-stage renal disease status post renal transplant in 2010 at [facility] on chronic immunosuppression, hyperlipidemia, type 2 diabetes mellitus and recent ESBL E. coli bacteremia in early July admitted 7/25/2022Secondary to nausea, vomiting, dysuria and rigors.He was recently admitted in early July secondary to septic shock from ESBL E. coli bacteremia. He completed a 2-week course of Meropenem on 7/14/2022 at a skilled nursing facility. On presentation he was febrile with a T-max of 103.3 and a normal white count of 7.7. Lactic acid was elevated.CT scan of the abdomen and pelvisCT scan of the abdomen and pelvisShowed some fluid-filled prominence of the small bowel concerning for ileus versus enteritis.  Blood cultures are now positive for gram-negative rods.  Patient is currently on meropenem given his history of ESBL E. coli.  Infectious disease service consulted for antibiotic recommendations.      All other review of systems reviewed and negative except those documented above in the HPI.     PMH:   Past Medical History:   Diagnosis Date   • Benign essential hypertension    • Hyperlipoproteinemia    • Hypertension     not on meds anymore   • Pain    • Polycystic kidney 9/10/10    RIGHT KIDNEY TRANSPLANT   • Sleep apnea    • Snoring    Recent ESBL E. coli bacteremia    PSH:  Past Surgical History:   Procedure Laterality Date   • KNEE MANIPULATION  2/16/2012    Performed by LATOYA CONNER at SURGERY [facility] ORS   • KNEE UNICOMPARTMENTAL  12/23/2011    Performed by LATOYA CONNER at SURGERY [facility] ORS   • KNEE ARTHROSCOPY  12/23/2011    Performed by LATOYA CONNER at SURGERY  [facility]   • KNEE ARTHROSCOPY  5/3/2011    Performed by HANANE GOLDMAN at SURGERY [facility] ORS   • MENISCECTOMY, KNEE, MEDIAL  5/3/2011    Performed by HANANE GOLDMAN at SURGERY SAME DAY [facility] ORS   • OTHER  9/10/10    RIGHT KIDNEY TRANSPLANT   • KNEE ARTHROPLASTY TOTAL  1/12/07    RIGHT   • KNEE ARTHROSCOPY  4/10/06    RIGHT   • OTHER ORTHOPEDIC SURGERY  7/8/74    LEFT KNEE DEBRIDEMENT       FAMILY HX:  Reviewed and not pertinent to the patient's clinical presentation    SOCIAL HX:  Social History     Socioeconomic History   • Marital status:      Spouse name: Not on file   • Number of children: Not on file   • Years of education: Not on file   • Highest education level: Not on file   Occupational History   • Not on file   Tobacco Use   • Smoking status: Never Smoker   • Smokeless tobacco: Never Used   Vaping Use   • Vaping Use: Never used   Substance and Sexual Activity   • Alcohol use: No   • Drug use: No   • Sexual activity: Yes     Partners: Female   Other Topics Concern   • Not on file   Social History Narrative   • Not on file     Social Determinants of Health     Financial Resource Strain: Not on file   Food Insecurity: Not on file   Transportation Needs: Not on file   Physical Activity: Not on file   Stress: Not on file   Social Connections: Not on file   Intimate Partner Violence: Not on file   Housing Stability: Not on file     Social History     Tobacco Use   Smoking Status Never Smoker   Smokeless Tobacco Never Used     Social History     Substance and Sexual Activity   Alcohol Use No       Allergies/Intolerances:  Allergies   Allergen Reactions   • Doxycycline Rash     Sweats and shakes: 9/28/17: Clarified allergy with patient. Allergy was in 1998 and he doesn't remember what happened. He thought the medication is for pain.  Tolerates doxycycline 9/2017       History reviewed with the patient     Other Current Medications:    Current Facility-Administered Medications:   •   midodrine (PROAMATINE) tablet 10 mg, 10 mg, Oral, Q8HRS, Carlo Avendano Jr., D.O., 10 mg at 07/26/22 0536  •  calcium carbonate (Tums) chewable tab 500 mg, 500 mg, Oral, BID, Roderick Ramirez M.D., 500 mg at 07/26/22 1016  •  atorvastatin (LIPITOR) tablet 80 mg, 80 mg, Oral, QHS, Mark Winters M.D., 80 mg at 07/25/22 2230  •  [Held by provider] carvedilol (COREG) tablet 3.125 mg, 3.125 mg, Oral, BID WITH MEALS, Mark Winters M.D.  •  gabapentin (NEURONTIN) capsule 600 mg, 600 mg, Oral, QHS, Mark Winters M.D., 600 mg at 07/25/22 2300  •  mycophenolate (CELLCEPT) capsule 500 mg, 500 mg, Oral, BID, Mark Winters M.D., 500 mg at 07/26/22 0536  •  tacrolimus (PROGRAF) capsule 1 mg, 1 mg, Oral, BID, Mark Winters M.D., 1 mg at 07/26/22 0537  •  lactated ringers infusion, , Intravenous, Continuous, Mark Winters M.D., Last Rate: 150 mL/hr at 07/26/22 0330, New Bag at 07/26/22 0330  •  acetaminophen (Tylenol) tablet 650 mg, 650 mg, Oral, Q6HRS PRN, Mark Winters M.D.  •  meropenem (Merrem) 500 mg in  mL IV-MBP, 500 mg, Intravenous, Q12HRS, Mark Winters M.D., Stopped at 07/26/22 0616  •  omeprazole (PRILOSEC) capsule 20 mg, 20 mg, Oral, Q12HRS, Carlo Avendano Jr., D.O., 20 mg at 07/26/22 0536  •  hydrocortisone sodium succinate PF (Solu-CORTEF) 100 MG injection 100 mg, 100 mg, Intravenous, Q8HRS, Carlo Avendano Jr., D.O., 100 mg at 07/26/22 0536  •  heparin injection 5,000 Units, 5,000 Units, Subcutaneous, Q8HRS, Carlo Avendano Jr., D.O., 5,000 Units at 07/26/22 0547  •  norepinephrine (Levophed) 8 mg in 250 mL NS infusion (premix), 0.5-30 mcg/min, Intravenous, Continuous, Stopped at 07/26/22 0845 **AND** vasopressin (VASOSTRICT) 20 Units in  mL Infusion, 0.03 Units/min, Intravenous, Continuous **AND** Notify physician if MAP remains less than 65 mmHg after 15 minutes of Norepinephrine and Vasopressin initiation, , , Once **AND** Initial Vasopressor Therapy for Septic  "Shock, , , CONTINUOUS, Carlo Avendano Jr., D.O.  •  insulin regular (HumuLIN R,NovoLIN R) injection, 3-14 Units, Subcutaneous, Q6HRS **AND** POC blood glucose manual result, , , Q6H **AND** NOTIFY MD and PharmD, , , Once **AND** Administer 20 grams of glucose (approximately 8 ounces of fruit juice) every 15 minutes PRN FSBG less than 70 mg/dL, , , PRN **AND** dextrose 50% (D50W) injection 25 g, 25 g, Intravenous, Q15 MIN PRN, Carlo Avendano Jr., D.O., 25 g at 22 0116  [unfilled]    Most Recent Vital Signs:  /59   Pulse 62   Temp 36.1 °C (97 °F)   Resp 17   Ht 1.969 m (6' 5.5\")   Wt 89.1 kg (196 lb 6.9 oz)   SpO2 97%   BMI 22.99 kg/m²   Temp  Av.3 °C (99.1 °F)  Min: 35.9 °C (96.7 °F)  Max: 39.7 °C (103.5 °F)    Physical Exam:  General: well nourished, no diaphoresis, well-appearing, no acute distress  HEENT: sclera anicteric, PERRL, extraocular muscles intact, mucous membranes moist, oropharynx clear and moist, no oral lesions or exudate  Neck: supple, no lymphadenopathy  Chest: CTAB, no rales, rhonchi or wheezes, normal work of breathing.  Cardiac: regular rate and rhythm, normal S1 S2, no murmurs, rubs or gallops  Abdomen: + bowel sounds, soft, non-tender, non-distended, no hepatosplenomegaly  Extremities: WWP, no edema, 2+ pedal pulses  Skin: warm and dry, no rashes or worrisome lesions  Neuro: Alert and oriented times 3, non-focal exam, speech fluent, full range of motion to bilateral upper and lower extremities  Psych: normal mood and behavior, pleasant; memory intact, normal judgement    Pertinent Lab Results:  Recent Labs     22   WBC 7.7      Recent Labs     22   HEMOGLOBIN 10.8*   HEMATOCRIT 35.6*   MCV 86.6   MCH 26.3*   PLATELETCT 123*         Recent Labs     22  1943 22  0031 22  0425   SODIUM 136 136 133*   POTASSIUM 4.5 4.3 4.7   CHLORIDE 101 102 103   CO2 21 22 20   CREATININE 2.03* 2.04* 1.80*        Recent Labs     " "07/25/22 1943   ALBUMIN 3.9        Pertinent Micro:  Results     Procedure Component Value Units Date/Time    BLOOD CULTURE [[id number]]  (Abnormal) Collected: 07/25/22 1943    Order Status: Completed Specimen: Blood from Peripheral Updated: 07/26/22 0726     Significant Indicator POS     Source BLD     Site PERIPHERAL     Culture Result Growth detected by Bactec instrument. 07/26/2022  07:21  Gram Stain: Gram negative rods.      Narrative:      CALL  [place]  [facility] tel. [phone],  CALLED  [facility] tel. [phone] 07/26/2022, 07:25, RB PERF. RESULTS CALLED TO: RN  [phone]  Per Hospital Policy: Only change Specimen Src: to \"Line\" if  specified by physician order.  Left Forearm/Arm    BLOOD CULTURE [[id number]]  (Abnormal) Collected: 07/25/22 1935    Order Status: Completed Specimen: Blood from Peripheral Updated: 07/26/22 0601     Significant Indicator POS     Source BLD     Site PERIPHERAL     Culture Result Growth detected by Bactec instrument. 07/26/2022  05:59  Gram Stain: Gram negative rods.      Narrative:      CALL  [place]  [facility] tel. [phone],  CALLED  [facility] tel. [phone] 07/26/2022, 06:00, RB PERF. RESULTS CALLED TO: RN  [phone]  Per Hospital Policy: Only change Specimen Src: to \"Line\" if  specified by physician order.  Right Forearm/Arm    Urinalysis [[id number]]     Order Status: Sent Specimen: Urine     URINALYSIS [[id number]]  (Abnormal) Collected: 07/25/22 2001    Order Status: Completed Specimen: Urine, Clean Catch Updated: 07/25/22 2039     Color Yellow     Character Cloudy     Specific Gravity 1.018     Ph 5.0     Glucose Negative mg/dL      Ketones Trace mg/dL      Protein 100 mg/dL      Bilirubin Negative     Urobilinogen, Urine 0.2     Nitrite Negative     Leukocyte Esterase Moderate     Occult Blood Moderate     Micro Urine Req Microscopic    Narrative:      Indication for culture:->Evaluation for sepsis without a  clear source of infection    URINE CULTURE(NEW) [[id number]] Collected: 07/25/22 2001    Order " Status: Sent Specimen: Urine, Clean Catch Updated: 07/25/22 2027    Narrative:      Indication for culture:->Evaluation for sepsis without a  clear source of infection        Blood Culture   Date Value Ref Range Status   09/27/2017 No growth after 5 days of incubation.  Final     Blood Culture Hold   Date Value Ref Range Status   09/27/2017 Collected  Final        Studies:  CT-ABDOMEN-PELVIS WITH    Result Date: 7/25/2022 7/25/2022 9:18 PM HISTORY/REASON FOR EXAM:  Sepsis. TECHNIQUE/EXAM DESCRIPTION:   CT scan of the abdomen and pelvis with contrast. Contrast-enhanced helical scanning was obtained from the diaphragmatic domes through the pubic symphysis following the bolus administration of nonionic contrast without complication. 95 mL of Omnipaque 350 nonionic contrast was administered without complication. Low dose optimization technique was utilized for this CT exam including automated exposure control and adjustment of the mA and/or kV according to patient size. COMPARISON: June 30, 2022 FINDINGS: Lower Chest: Linear densities the bilateral lung bases favor changes of atelectasis. Liver: Portal edema is seen. Spleen: Unremarkable. Pancreas: Unremarkable. Gallbladder: No calcified stones. Biliary: Nondilated. Adrenal glands: Normal. Kidneys: The right kidney is not visualized. There is transplant kidney seen in the right pelvis which appears unremarkable without visualized hydronephrosis. Native left kidney is visualized which appears enlarged with innumerable variable size renal cyst. Bowel: Fluid-filled prominence of small bowel is seen. The appendix appears within normal limits. Lymph nodes: No adenopathy. Vasculature: Atherosclerotic changes are seen including atherosclerotic coronary artery calcifications. 3.0 cm fusiform infrarenal abdominal aortic aneurysm is seen. Peritoneum: Unremarkable without ascites. Musculoskeletal: No acute or destructive process. Pelvis: No adenopathy or free fluid.  Fat-containing left inguinal hernia is seen.     1.  Fluid-filled prominence of small bowel, consider ileus and/or enteritis. Radiographic follow-up to resolution recommended as clinically appropriate to exclude progression to obstructive changes. 2.  Periportal edema, nonspecific, can be associated with acute hepatitis in the appropriate clinical setting. 3.  Changes of the left kidney most compatible with polycystic kidney disease. 4.  3.0 cm fusiform infrarenal abdominal aortic aneurysm, radiographic follow-up and surveillance recommended as clinically appropriate. 5.  Fat-containing left inguinal hernia 6.  Atherosclerosis and atherosclerotic coronary artery disease    CT-RENAL COLIC EVALUATION(A/P W/O)    Result Date: 6/30/2022 6/30/2022 11:05 PM HISTORY/REASON FOR EXAM:  Flank pain, kidney stone suspected; NAUSEA/VOMITING; PAINFUL URINATION. TECHNIQUE/EXAM DESCRIPTION: CT scan of the abdomen and pelvis without contrast. Noncontrast helical scanning was obtained from the diaphragmatic domes through the pubic symphysis. Low dose optimization technique was utilized for this CT exam including automated exposure control and adjustment of the mA and/or kV according to patient size. COMPARISON: May 27, 2022 FINDINGS: Lower Chest: Linear densities the bilateral lung bases favor changes of atelectasis. Liver: Normal. Spleen: Unremarkable. Pancreas: Unremarkable. Gallbladder: No calcified stones. Biliary: Nondilated. Adrenal glands: Normal. Kidneys: Right pelvic kidney is seen, appearance suggests transplant kidney. The right kidney is otherwise not visualized and is likely surgically absent. There is enlargement of the left kidney with innumerable cysts, appearance most compatible with polycystic kidney disease. No hydronephrosis of the transplant kidney or renal calculi are appreciated. Bowel: No obstruction or acute inflammation. Scattered colonic diverticula are seen. Lymph nodes: No adenopathy. Vasculature:  Unremarkable. Peritoneum: Unremarkable without ascites. Musculoskeletal: No acute or destructive process. Pelvis: No adenopathy or free fluid. Fat-containing left inguinal hernia is seen.     1.  Changes compatible with polycystic kidney disease. 2.  Diverticulosis 3.  Fat-containing left inguinal hernia    DX-CHEST-FOR LINE PLACEMENT Perform procedure in: Patient's Room    Result Date: 7/1/2022 7/1/2022 5:03 AM HISTORY/REASON FOR EXAM: Central line placement TECHNIQUE/EXAM DESCRIPTION:  PA and lateral views of the chest. COMPARISON: None FINDINGS: Right internal jugular central line is seen terminating at the right atriocaval junction.   The cardiac silhouette appears within normal limits. Atherosclerotic calcification of the aorta is noted.  The central pulmonary vasculature appears normal. Bilateral lung volumes are diminished.  Hazy interstitial bilateral pulmonary opacities are seen. No significant pleural effusions are identified. The bony structures appear age-appropriate.     1.  Interstitial edema and/or infiltrate. 2.  Atherosclerosis    DX-CHEST-PORTABLE (1 VIEW)    Result Date: 7/25/2022 7/25/2022 7:38 PM HISTORY/REASON FOR EXAM:  possible sepsis. TECHNIQUE/EXAM DESCRIPTION AND NUMBER OF VIEWS: Single AP view of the chest. COMPARISON: 7/1/2022 FINDINGS: Heart: The cardiac silhouette is stable in size. Mediastinum: Normal contours. Calcification in the aorta. Lungs: Prominent reticular markings are unchanged. No pneumothorax is identified. Pleura: No pleural fluid. Bones: No suspicious bony lesions. Lines/tubes: Right internal jugular catheter has been removed.     No significant interval change.    DX-CHEST-PORTABLE (1 VIEW)    Result Date: 6/30/2022 6/30/2022 10:44 PM HISTORY/REASON FOR EXAM: Possible sepsis. TECHNIQUE/EXAM DESCRIPTION:  Single AP view of the chest. COMPARISON: June 14, 2022 FINDINGS: The cardiac silhouette appears within normal limits. Atherosclerotic calcification of the aorta  is noted.  The central pulmonary vasculature appears normal. The lungs appear well expanded bilaterally.  Patchy bilateral pulmonary opacities are seen. No significant pleural effusions are identified. The bony structures appear age-appropriate.     1.  Patchy bilateral pulmonary infiltrates or edema. 2.  Atherosclerosis    EC-ECHOCARDIOGRAM COMPLETE W/ CONT    Result Date: 2022  Transthoracic Echo Report Echocardiography Laboratory CONCLUSIONS Compared to the prior echo on 2022. Definite vegetations are not identified, failure to do so does not exclude their presence or the diagnosis of endocarditis, if clinically indicated, a transesophageal study would be useful. Mildly reduced left ventricular systolic function. The left ventricular ejection fraction is visually estimated to be 45%. Normal right ventricular size and systolic function. Aortic valve sclerosis without significant stenosis. No pericardial effusion. CIARA FORRESTER Exam Date:         2022                    07:25 Exam Location:     Inpatient Priority:          Routine Ordering Physician:        LES MYERS Referring Physician: Sonographer:               Meeta Suarez SARAI Age:    66     Gender:    M MRN:    [id number] :    1955 BSA:    2.32   Ht (in):    77     Wt (lb):    217 Exam Type:     Complete, Contrast Indications:     Endocarditis ICD Codes:       421 CPT Codes:       10040,  BP:   110    /   57     HR: Technical Quality:       Good MEASUREMENTS  (Male / Female) Normal Values 2D ECHO LV Diastolic Diameter PLAX        5.6 cm                4.2 - 5.9 / 3.9 - 5.3 cm LV Systolic Diameter PLAX         4.7 cm                2.1 - 4.0 cm IVS Diastolic Thickness           0.86 cm               LVPW Diastolic Thickness          0.86 cm               LVOT Diameter                     1.9 cm                Estimated LV Ejection Fraction    45 %                  LV Ejection Fraction MOD BP       36.4 %                >= 55   % LV Ejection Fraction MOD 4C       49.7 %                LV Ejection Fraction MOD 2C       33.2 %                LV Ejection Fraction 4C AL        50.1 %                LV Ejection Fraction 2C AL        37.4 %                LA Volume Index                   34.9 cm3/m2           16 - 28 cm3/m2 DOPPLER AV Peak Velocity                  1.5 m/s               AV Peak Gradient                  9.5 mmHg              AV Mean Gradient                  4 mmHg                LVOT Peak Velocity                1.1 m/s               AV Area Cont Eq vti               2.5 cm2               Mitral E Point Velocity           0.56 m/s              Mitral E to A Ratio               0.85                  MV Pressure Half Time             107 ms                MV Area PHT                       2.1 cm2               MV Deceleration Time              366 ms                TR Peak Velocity                  252 cm/s              PV Peak Velocity                  1 m/s                 PV Peak Gradient                  4.3 mmHg              RVOT Peak Velocity                0.79 m/s              LV E' Lateral Velocity            9.4 cm/s              Mitral E to LV E' Lateral Ratio   6                     LV E' Septal Velocity             6.7 cm/s              Mitral E to LV E' Septal Ratio    8.3                   * Indicates values subject to auto-interpretation LV EF:  45    % FINDINGS Left Ventricle Contrast was used to enhance visualization of the endocardial border. 3 mL of contrast was administered. Existing IV was used at the right arm. Normal left ventricular chamber size. Normal left ventricular wall thickness. Mildly reduced left ventricular systolic function. The left ventricular ejection fraction is visually estimated to be 45%. Global hypokinesis with regional variation. Grade I diastolic dysfunction. Right Ventricle Normal right ventricular size and systolic function. Right Atrium Normal right atrial size. The inferior vena  cava is not well visualized. Left Atrium Normal left atrial size. Left atrial volume index is 31 mL/sq m. Mitral Valve Structurally normal mitral valve without significant stenosis. Trace mitral regurgitation. Aortic Valve Tricuspid aortic valve. Aortic valve sclerosis without significant stenosis. No aortic insufficiency. Tricuspid Valve Structurally normal tricuspid valve without significant stenosis. Trace tricuspid regurgitation. Right ventricular systolic pressure is estimated to be 28 mmHg. Pulmonic Valve Structurally normal pulmonic valve without significant stenosis. Trace pulmonic insufficiency. Pericardium No pericardial effusion. Aorta Normal aortic root for body surface area. The ascending aorta diameter is 3.3 cm. Mikel Rivera MD (Electronically Signed) Final Date:     02 July 2022                 14:31    IR-MIDLINE CATHETER INSERTION WO GUIDANCE > AGE 3    Result Date: 7/5/2022  HISTORY/REASON FOR EXAM:  Midline Placement   TECHNIQUE/EXAM DESCRIPTION AND NUMBER OF VIEWS: Midline insertion with ultrasound guidance.  FINDINGS: Midline insertion with Ultrasound Guidance was performed by qualified nursing staff without the assistance of a Radiologist. Midline positioning as measured by RN or as appropriate length of catheter selected.              Ultrasound-guided midline placement performed by qualified nursing staff as above.       IMPRESSION:   1.  Septic shock  2.  Gram-negative bacteremia, Likely urinary source  3.  History of renal transplant on chronic immunosuppression  4.  Type 2 diabetes mellitus     PLAN:   Jama Altman is a 66 y.o. ManWell-known to the infectious disease service with a history of polycystic kidney disease leading to end-stage renal disease status post renal transplant in 2010, [place] and recently hospitalized in early July for ESBL E. coli bacteremia secondary to urinary source admitted on 7/25/2022 secondary to generalized weakness, dysuria and rigors.  Patient found  to be in septic shock with gram-negative bacteremia likely from urinary source.    - Continue IV meropenem 500 mg every 6 hours given history of recent ESBL E. Coli  - Follow blood cultures from 7/25-gram-negative rods.  Follow speciation and susceptibilities  -Repeat blood cultures x2 ordered today    Plan of care discussed with intensivist, Dr De Leon and bedside nurse. Will continue to follow    Vivian Francois M.D.      Please note that this dictation was created using voice recognition software. I have worked with technical experts from [facility] to optimize the interface.  I have made every reasonable attempt to correct obvious errors, but there may be errors of grammar and possibly content that I did not discover before finalizing the note.

## 2022-07-26 NOTE — ASSESSMENT & PLAN NOTE
History of renal transplant 12 years ago, transiently needed HD last month  Continue aggressive crystalloid resuscitation  Renal dose meds, avoid nephrotoxins  Strict I/Os  Follow renal function  Consider inpatient nephrology consultation should the patient worsen, patient follows with [facility], Dr. León

## 2022-07-26 NOTE — ED PROVIDER NOTES
ED Provider Note    CHIEF COMPLAINT  Chief Complaint   Patient presents with   • UTI     Patient concerned for a UTI, not urinating often and when he does it's very small amounts, last urination 1400 today       HPI  Jama Altman is a 66 y.o. male who presents to the emergency department with chief complaint of a day and a half of fevers rigors urinary hesitation decreased urine output and nausea vomiting.  Patient states this feels extremely similar to when he was here for sepsis for urinary tract infection.  He was just recently discharged to an outpatient facility for rehab for infections of the lower extremity.  He has a history of polycystic kidney disease is on immune therapy second and the fact that he has a kidney transplant.  He did not take anything for fever prior to arrival.    He denies any cough congestion flank pain easy bleeding or bruising or rash    REVIEW OF SYSTEMS  Positives as above. Pertinent negatives include cough congestion headache neck stiffness easy bleeding or bruising rash unilateral weakness numbness tingling flank pain bloody stools bloody emesis  All other review of systems are negative    PAST MEDICAL HISTORY   has a past medical history of Benign essential hypertension, Hyperlipoproteinemia, Hypertension, Pain, Polycystic kidney (9/10/10), Sleep apnea, and Snoring.    SOCIAL HISTORY  Social History     Tobacco Use   • Smoking status: Never Smoker   • Smokeless tobacco: Never Used   Vaping Use   • Vaping Use: Never used   Substance and Sexual Activity   • Alcohol use: No   • Drug use: No   • Sexual activity: Yes     Partners: Female       SURGICAL HISTORY   has a past surgical history that includes knee arthroplasty total (1/12/07); knee arthroscopy (4/10/06); other orthopedic surgery (7/8/74); other (9/10/10); knee arthroscopy (5/3/2011); meniscectomy, knee, medial (5/3/2011); knee unicompartmental (12/23/2011); knee arthroscopy (12/23/2011); and knee manipulation  "(2/16/2012).    CURRENT MEDICATIONS  Home Medications     Reviewed by Rex Gama R.N. (Registered Nurse) on 07/25/22 at 1925  Med List Status: <None>   Medication Last Dose Status   acetaminophen (TYLENOL) 325 MG Tab  Active   apixaban (ELIQUIS) 5mg Tab  Active   atorvastatin (LIPITOR) 80 MG tablet  Active   carboxymethylcellulose (REFRESH TEARS) 0.5 % Solution  Active   carvedilol (COREG) 3.125 MG Tab  Active   gabapentin (NEURONTIN) 300 MG Cap  Active   glyBURIDE (DIABETA) 5 MG Tab  Active   linagliptin (TRADJENTA) 5 MG Tab tablet  Active   mycophenolate (CELLCEPT) 250 MG Cap  Active   predniSONE (DELTASONE) 5 MG Tab  Active   SITagliptin (JANUVIA) 50 MG Tab  Active   tacrolimus (PROGRAF) 1 MG Cap  Active                ALLERGIES  Allergies   Allergen Reactions   • Doxycycline Rash     Sweats and shakes: 9/28/17: Clarified allergy with patient. Allergy was in 1998 and he doesn't remember what happened. He thought the medication is for pain.  Tolerates doxycycline 9/2017       PHYSICAL EXAM  VITAL SIGNS: BP (!) 155/83   Pulse (!) 116   Temp (!) 39.7 °C (103.5 °F) (Oral)   Resp 20   Ht 1.969 m (6' 5.5\")   Wt 92.1 kg (203 lb)   SpO2 89%   BMI 23.76 kg/m²    Pulse ox interpretation: I interpret this pulse ox as normal.  Constitutional: Alert in no apparent distress.  HENT: Normocephalic atraumatic, dry mucous membranes  Eyes: PER, Conjunctiva normal, Non-icteric.   Neck: Normal range of motion, No tenderness, Supple, No stridor.   Cardiovascular: Regular rhythm tachycardic, no murmurs.   Thorax & Lungs: Normal breath sounds, No respiratory distress, No wheezing, No chest tenderness.   Abdomen: Bowel sounds normal, Soft, suprapubic tenderness without rebound or guarding, No pulsatile masses. No peritoneal signs.  Skin: Warm, Dry, No erythema, No rash.   Back: No bony tenderness, No CVA tenderness.   Extremities/MSK: Intact equal distal pulses, No edema, No tenderness, No cyanosis, no major " deformities noted  Neurologic: Alert and oriented x3, No focal deficits noted.       DIFFERENTIAL DIAGNOSIS AND WORK UP PLAN    This is a 66 y.o. male who presents with signs symptoms concerning for sepsis he is febrile hot to the touch tachycardic, concern for urinary tract infection or SANKET will evaluate for renal abscess the patient has a renal transplant so this very concerning for possible SANKET, he does not have a headache is not short of breath he will be receiving IV fluid resuscitation IV antibiotics antipyretics fortunately his blood pressure is well at this time he is not hypoxic or tachypneic.    DIAGNOSTIC STUDIES / PROCEDURES      LABS  Pertinent Lab Findings    Labs Reviewed   LACTIC ACID - Abnormal; Notable for the following components:       Result Value    Lactic Acid 2.8 (*)     All other components within normal limits   LACTIC ACID - Abnormal; Notable for the following components:    Lactic Acid 2.5 (*)     All other components within normal limits    Narrative:     Repeat if initial lactic acid result is greater than 2   CBC WITH DIFFERENTIAL - Abnormal; Notable for the following components:    RBC 4.11 (*)     Hemoglobin 10.8 (*)     Hematocrit 35.6 (*)     MCH 26.3 (*)     MCHC 30.3 (*)     RDW 55.6 (*)     Platelet Count 123 (*)     Neutrophils-Polys 94.00 (*)     Lymphocytes 4.00 (*)     Lymphs (Absolute) 0.31 (*)     All other components within normal limits   COMP METABOLIC PANEL - Abnormal; Notable for the following components:    Glucose 100 (*)     Bun 28 (*)     Creatinine 2.03 (*)     Alkaline Phosphatase 103 (*)     All other components within normal limits   URINALYSIS - Abnormal; Notable for the following components:    Character Cloudy (*)     Ketones Trace (*)     Protein 100 (*)     Leukocyte Esterase Moderate (*)     Occult Blood Moderate (*)     All other components within normal limits    Narrative:     Indication for culture:->Evaluation for sepsis without a  clear source of  "infection   URINE MICROSCOPIC (W/UA) - Abnormal; Notable for the following components:    WBC Packed (*)     RBC 10-20 (*)     Bacteria Many (*)     Hyaline Cast 11-20 (*)     All other components within normal limits    Narrative:     Indication for culture:->Evaluation for sepsis without a  clear source of infection   ESTIMATED GFR - Abnormal; Notable for the following components:    GFR (CKD-EPI) 36 (*)     All other components within normal limits   PROTHROMBIN TIME - Abnormal; Notable for the following components:    PT 16.8 (*)     INR 1.38 (*)     All other components within normal limits    Narrative:     If not done within the last 4 hours  Indicate which anticoagulants the patient is on:->UNKNOWN   CORTISOL    Narrative:     If not done within the last 4 hours  Indicate which anticoagulants the patient is on:->UNKNOWN   URINE CULTURE(NEW)    Narrative:     Indication for culture:->Evaluation for sepsis without a  clear source of infection   BLOOD CULTURE    Narrative:     Per Hospital Policy: Only change Specimen Src: to \"Line\" if  specified by physician order.   BLOOD CULTURE    Narrative:     Per Hospital Policy: Only change Specimen Src: to \"Line\" if  specified by physician order.   BASIC METABOLIC PANEL   LACTIC ACID   URINALYSIS   HEMOGLOBIN A1C   LACTIC ACID       RADIOLOGY  CT-ABDOMEN-PELVIS WITH   Final Result         1.  Fluid-filled prominence of small bowel, consider ileus and/or enteritis. Radiographic follow-up to resolution recommended as clinically appropriate to exclude progression to obstructive changes.   2.  Periportal edema, nonspecific, can be associated with acute hepatitis in the appropriate clinical setting.   3.  Changes of the left kidney most compatible with polycystic kidney disease.   4.  3.0 cm fusiform infrarenal abdominal aortic aneurysm, radiographic follow-up and surveillance recommended as clinically appropriate.   5.  Fat-containing left inguinal hernia   6.  " Atherosclerosis and atherosclerotic coronary artery disease      DX-CHEST-PORTABLE (1 VIEW)   Final Result      No significant interval change.        The radiologist's interpretation of all radiological studies have been reviewed by me.      COURSE & MEDICAL DECISION MAKING  Pertinent Labs & Imaging studies reviewed. (See chart for details)    Postvoid residual/bladder scan is 72 shows no evidence of outlet obstruction he does not have any prostate issues.      Patient does indeed have evidence of urinary tract infection concerning for pyelonephritis in the setting of fever as well as an SANKET, he is having positive response to IV fluids as well as antipyretics and his fever slowly starting to come down.  Due to his Prograf CellCept this is likely why he is having severe response to infectious process.    CT did not reveal any evidence of renal abscess    9:06 PM  Spoke with Dr. Winters with hospitalist service who is excepted patient for hospitalization for pyelonephritis and sepsis in the setting of transplant.      Unfortunately after the patient was admitted to the hospitalist service he did have a drop in his blood pressure that did require further fluid resuscitation as well as Levophed.  ICU was consulted at the time and the patient be taken to the ICU in critical condition.    CRITICAL CARE  The very real possibilty of a deterioration of this patient's condition required the highest level of my preparedness for sudden, emergent intervention.  I provided critical care services, which included medication orders, frequent reevaluations of the patient's condition and response to treatment, ordering and reviewing test results, and discussing the case with various consultants.  The critical care time associated with the care of the patient was 35 minutes. Review chart for interventions. This time is exclusive of any other billable procedures.     /60   Pulse 93   Temp (!) 38.5 °C (101.3 °F) (Oral)   Resp 16  "  Ht 1.969 m (6' 5.5\")   Wt 92.1 kg (203 lb)   SpO2 96%   BMI 23.76 kg/m²       I verified that the patient was wearing a mask and I was wearing appropriate PPE every time I entered the room. The patient's mask was on the patient at all times during my encounter except for a brief view of the oropharynx.          FINAL IMPRESSION  1. Pyelonephritis  2. Nausea and vomiting not intractable  3. SANKET   4. Sepsis     Critical care time 35 minutes    Electronically signed by: Micheline Luo M.D., 7/25/2022 7:31 PM    This dictation has been created using voice recognition software and/or scribes. The accuracy of the dictation is limited by the abilities of the software and the expertise of the scribes. I expect there may be some errors of grammar and possibly content. I made every attempt to manually correct the errors within my dictation. However, errors related to voice recognition software and/or scribes may still exist and should be interpreted within the appropriate context.    "

## 2022-07-26 NOTE — ASSESSMENT & PLAN NOTE
Type II diabetes due to prolonged use of steroids  Goal blood glucose 140-180  Sliding scale insulin, accuchecks  hypoglycemia protocol  A1c 10.7 two months ago

## 2022-07-26 NOTE — ASSESSMENT & PLAN NOTE
ESBL EcoliGN bacteremia  Hx of ESBL Ecoli  Meropenem here  Once home Ertapenem daily until 8/8/22 via IV and then switch to oral ciprofloxacin per ID for two weeks  7/28 ordered midline catheter, 7/29 concern of obstruction on midline and U/S ordered. May need new picc.  S/p pressor support  Wean IV fluids  Midodrine weaning as able.  Monitor vitals, I/O's, labs  ID consulting

## 2022-07-26 NOTE — PROGRESS NOTES
4 Eyes Skin Assessment Completed by LAN Das and LAN Ortiz.    Head WDL  Ears WDL  Nose WDL  Mouth WDL  Neck WDL  Breast/Chest WDL  Shoulder Blades WDL  Spine WDL  (R) Arm/Elbow/Hand Redness and Blanching  (L) Arm/Elbow/Hand Redness and Blanching  Abdomen WDL  Groin WDL  Scrotum/Coccyx/Buttocks Redness, Excoriation and Discoloration  (R) Leg Redness, Scab, Weeping and Edema, wound documented.   (L) Leg Redness, Blanching and Swelling  (R) Heel/Foot/Toe Ulcer(s)  (L) Heel/Foot/Toe WDL          Devices In Places ECG, Blood Pressure Cuff, Pulse Ox and Nasal Cannula      Interventions In Place Pillows and Low Air Loss Mattress    Possible Skin Injury Yes    Pictures Uploaded Into Epic Yes  Wound Consult Placed Yes  RN Wound Prevention Protocol Ordered Yes

## 2022-07-26 NOTE — H&P
Hospital Medicine History & Physical Note    Date of Service  7/25/2022    Primary Care Physician  Ganesh Benton III, M.D.    Consultants  None    Code Status  Full Code    Chief Complaint  Chief Complaint   Patient presents with   • UTI     Patient concerned for a UTI, not urinating often and when he does it's very small amounts, last urination 1400 today       History of Presenting Illness  Jama Altman is a 66 y.o. male who presented 7/25/2022 with generalized weakness for the last several days.  Endorses associated chills and shaking of extremities.    Has history of polycystic kidney disease s/p renal transplant 2010.  Recent admission for septic shock secondary to E. coli bacteremia.  Had PEA arrest and intubation.  Known to have nonhealing ulcerations to which angiogram revealed an occlusion of the anterior tibial artery.  During hospitalization went into renal failure requiring transient dialysis to which kidney functions resolved.  Was discharged to nursing home shortly after.  Return to ED for septic shock and SANKET secondary to ESBL E. coli to which he was started meropenem.  Patient sepsis and SANKET resolved.    In ED, patient febrile and tachycardic.  Found to have SANKET.  Lactic acidosis.  UA positive for UTI.  Chest x-ray negative.    I discussed the plan of care with patient.    Review of Systems  Review of Systems   Constitutional: Positive for chills and malaise/fatigue.   HENT: Negative.    Eyes: Negative.    Respiratory: Negative.    Cardiovascular: Negative.    Gastrointestinal: Negative.    Musculoskeletal: Negative.    Skin: Negative.    Neurological: Positive for weakness.   Endo/Heme/Allergies: Negative.    Psychiatric/Behavioral: Negative.        Past Medical History   has a past medical history of Benign essential hypertension, Hyperlipoproteinemia, Hypertension, Pain, Polycystic kidney (9/10/10), Sleep apnea, and Snoring.    Surgical History   has a past surgical history that includes knee  arthroplasty total (1/12/07); knee arthroscopy (4/10/06); other orthopedic surgery (7/8/74); other (9/10/10); knee arthroscopy (5/3/2011); meniscectomy, knee, medial (5/3/2011); knee unicompartmental (12/23/2011); knee arthroscopy (12/23/2011); and knee manipulation (2/16/2012).     Family History   Family history reviewed with patient. There is no family history that is pertinent to the chief complaint.     Social History   reports that he has never smoked. He has never used smokeless tobacco. He reports that he does not drink alcohol and does not use drugs.    Allergies  Allergies   Allergen Reactions   • Doxycycline Rash     Sweats and shakes: 9/28/17: Clarified allergy with patient. Allergy was in 1998 and he doesn't remember what happened. He thought the medication is for pain.  Tolerates doxycycline 9/2017       Medications  Prior to Admission Medications   Prescriptions Last Dose Informant Patient Reported? Taking?   SITagliptin (JANUVIA) 50 MG Tab  MAR from Other Facility No No   Sig: Take 1 Tablet by mouth every day.   acetaminophen (TYLENOL) 325 MG Tab  MAR from Other Facility Yes No   Sig: Take 325-650 mg by mouth every four hours as needed for Mild Pain, Moderate Pain or Fever. NOT TO EXCEED 3,000 mg of acetaminophen per 24 hours.   apixaban (ELIQUIS) 5mg Tab  MAR from Other Facility No No   Sig: Take 1 Tablet by mouth 2 times a day. Indications: Thromboembolism secondary to Atrial Fibrillation   atorvastatin (LIPITOR) 80 MG tablet  MAR from Other Facility No No   Sig: Take 1 Tablet by mouth at bedtime.   carboxymethylcellulose (REFRESH TEARS) 0.5 % Solution  MAR from Other Facility Yes No   Sig: Administer 1 Drop into both eyes 3 times a day.   carvedilol (COREG) 3.125 MG Tab   No No   Sig: Take 1 Tablet by mouth 2 times a day with meals.   gabapentin (NEURONTIN) 300 MG Cap  MAR from Other Facility Yes No   Sig: Take 300 mg by mouth at bedtime.   glyBURIDE (DIABETA) 5 MG Tab  MAR from Other Facility Yes  No   Sig: Take 10 mg by mouth every morning with breakfast. 2 tablets = 10 mg.   linagliptin (TRADJENTA) 5 MG Tab tablet  MAR from Other Facility Yes No   Sig: Take 5 mg by mouth every day.   mycophenolate (CELLCEPT) 250 MG Cap  MAR from Other Facility No No   Sig: Take 2 Capsules by mouth 2 times a day.   predniSONE (DELTASONE) 5 MG Tab  MAR from Other Facility No No   Sig: Take 1 Tablet by mouth every day.   tacrolimus (PROGRAF) 1 MG Cap  MAR from Other Facility No No   Sig: Take 1 Capsule by mouth 2 times a day.      Facility-Administered Medications: None       Physical Exam  Temp:  [38.5 °C (101.3 °F)-39.7 °C (103.5 °F)] 38.5 °C (101.3 °F)  Pulse:  [] 94  Resp:  [19-20] 20  BP: (155)/(83) 155/83  SpO2:  [84 %-92 %] 92 %  Blood Pressure : (!) 155/83   Temperature: (!) 38.5 °C (101.3 °F)   Pulse: 94   Respiration: 20   Pulse Oximetry: 92 %       Physical Exam  Constitutional:       Appearance: Normal appearance. He is normal weight.   HENT:      Head: Normocephalic.      Nose: Nose normal.      Mouth/Throat:      Mouth: Mucous membranes are moist.   Eyes:      Pupils: Pupils are equal, round, and reactive to light.   Cardiovascular:      Rate and Rhythm: Tachycardia present. Rhythm irregular.   Pulmonary:      Effort: Pulmonary effort is normal.      Breath sounds: Normal breath sounds.   Abdominal:      General: Abdomen is flat. Bowel sounds are normal.      Palpations: Abdomen is soft.   Musculoskeletal:      Cervical back: Normal range of motion.   Skin:     General: Skin is warm.   Neurological:      General: No focal deficit present.      Mental Status: He is alert. Mental status is at baseline.   Psychiatric:         Mood and Affect: Mood normal.         Behavior: Behavior normal.         Thought Content: Thought content normal.         Judgment: Judgment normal.         Laboratory:  Recent Labs     07/25/22 1943   WBC 7.7   RBC 4.11*   HEMOGLOBIN 10.8*   HEMATOCRIT 35.6*   MCV 86.6   MCH 26.3*    MCHC 30.3*   RDW 55.6*   PLATELETCT 123*   MPV 11.6     Recent Labs     07/25/22 1943   SODIUM 136   POTASSIUM 4.5   CHLORIDE 101   CO2 21   GLUCOSE 100*   BUN 28*   CREATININE 2.03*   CALCIUM 8.8     Recent Labs     07/25/22 1943   ALTSGPT 22   ASTSGOT 20   ALKPHOSPHAT 103*   TBILIRUBIN 1.0   GLUCOSE 100*         No results for input(s): NTPROBNP in the last 72 hours.      No results for input(s): TROPONINT in the last 72 hours.    Imaging:  DX-CHEST-PORTABLE (1 VIEW)   Final Result      No significant interval change.      CT-ABDOMEN-PELVIS WITH    (Results Pending)       no X-Ray or EKG requiring interpretation    Assessment/Plan:  Justification for Admission Status  I anticipate this patient is appropriate for inpatient as he has sepsis secondary to UTI and SANKET.    * Sepsis(995.91)  Assessment & Plan  This is Sepsis Present on admission  SIRS criteria identified on my evaluation include: Fever, with temperature greater than 101 deg F and Tachycardia, with heart rate greater than 90 BPM  Source is UA  Sepsis protocol initiated  Fluid resuscitation ordered per protocol  Crystalloid Fluid Administration: Fluid resuscitation ordered per standard protocol - 30 mL/kg per current or ideal body weight  IV antibiotics as appropriate for source of sepsis  Reassessment: I have reassessed the patient's hemodynamic status          Urinary tract infection- (present on admission)  Assessment & Plan  Last admission grew ESBL E. coli  Continue meropenem, renally adjust  Cultures    PAF (paroxysmal atrial fibrillation) (HCC)- (present on admission)  Assessment & Plan  Eliquis     Advanced care planning/counseling discussion  Assessment & Plan  Goals of cares discussed with patient and family member at bedside.  Full code.    Diabetes mellitus with coincident hypertension (HCC)- (present on admission)  Assessment & Plan  Sliding scale     Hyperlipidemia  Assessment & Plan  Continue lipitor     History of renal transplant-  (present on admission)  Assessment & Plan  Continue prograd and cellcept       VTE prophylaxis: therapeutic anticoagulation with Eliquis

## 2022-07-26 NOTE — PROGRESS NOTES
Patient monitor showed sinus tachycardia with frequent multifocal PVCs, Dr. Avendano updated. STAT EKG ordered, magnesium sulfate ordered.

## 2022-07-26 NOTE — ASSESSMENT & PLAN NOTE
Acute on chronic.    Fluids  Serial BMP showing improvement.  7/29 Cr:1.29, BUN:19  7/28  Cr:1.7, BUN:25  7/27 Cr:1.44, BUN:30  Stop IV fluids due to hx of EF:45%  Monitor I/O's, labs.

## 2022-07-26 NOTE — CONSULTS
Critical Care Consultation    Date of consult: 7/25/2022    Referring Physician  Solomon Winters MD    Reason for Consultation  Septic shock    History of Presenting Illness  66 y.o. male with a past medical history of hypertension, polycystic kidney disease leading to end-stage renal disease status post renal transplant in 2010 at [facility] (on Prograf, tacrolimus, and daily prednisone), hyperlipidemia, diabetes and recent ESBL E. coli bacteremia who presented 7/25/2022 with rigors, fatigue, N/V, Dysuria and decreased UOP consistent with prior history of UTI and bacteremia.  In the ER he was initially hypertensive, febrile, tachycardic and tachypneic.  His UA was again positive for UTI, CT of the abdomen showed fluid-filled small bowel, periportal edema, 3 cm fusiform infrarenal aneurysm and changes consistent with chronic kidney disease.  In review of his prior hospitalization he was discharged on meropenem until 7/14/2022 due to his ESBL E. coli bacteremia.  While in the ER the patient received 3 L crystalloid and was started on meropenem.  He was initially to be admitted to the hospital service however his hemodynamics worsened requiring norepinephrine infusion therefore critical care has been consulted for possible ICU admission.    Code Status  Full Code    Review of Systems  Review of Systems   Constitutional: Positive for fever and malaise/fatigue. Negative for chills.        Rigors   Eyes: Negative for blurred vision.   Respiratory: Negative for cough, sputum production, shortness of breath and stridor.    Cardiovascular: Negative for chest pain.   Gastrointestinal: Positive for nausea and vomiting. Negative for abdominal pain.   Genitourinary: Positive for dysuria.        Decreased UOP   Musculoskeletal: Negative for myalgias.   Skin: Negative for rash.        LE wounds   Neurological: Negative for dizziness, sensory change and focal weakness.        B/L LE neuropathy       Past Medical History   has a past  medical history of Benign essential hypertension, Hyperlipoproteinemia, Hypertension, Pain, Polycystic kidney (9/10/10), Sleep apnea, and Snoring.    Surgical History   has a past surgical history that includes knee arthroplasty total (1/12/07); knee arthroscopy (4/10/06); other orthopedic surgery (7/8/74); other (9/10/10); knee arthroscopy (5/3/2011); meniscectomy, knee, medial (5/3/2011); knee unicompartmental (12/23/2011); knee arthroscopy (12/23/2011); and knee manipulation (2/16/2012).    Family History  family history is not on file.    Social History   reports that he has never smoked. He has never used smokeless tobacco. He reports that he does not drink alcohol and does not use drugs.    Medications  Home Medications     Reviewed by Emir Sage (Pharmacy Tech) on 07/25/22 at 2210  Med List Status: Complete   Medication Last Dose Status   acetaminophen (TYLENOL) 325 MG Tab 7/20/2022 Active   Amino Acids-Protein Hydrolys (PRO-STAT) Liquid 7/25/2022 Active   apixaban (ELIQUIS) 5mg Tab 7/25/2022 Active   atorvastatin (LIPITOR) 80 MG tablet 7/24/2022 Active   carboxymethylcellulose (REFRESH TEARS) 0.5 % Solution 7/25/2022 Active   carvedilol (COREG) 3.125 MG Tab 7/25/2022 Active   cyclobenzaprine (FLEXERIL) 5 mg tablet 7/14/2022 Active   gabapentin (NEURONTIN) 300 MG Cap 7/24/2022 Active   glyBURIDE (DIABETA) 5 MG Tab 7/25/2022 Active   linagliptin (TRADJENTA) 5 MG Tab tablet 7/25/2022 Active   meropenem (MERREM) 500 MG Recon Soln 7/14/2022 Active   midodrine (PROAMATINE) 5 MG Tab 7/25/2022 Active   mycophenolate (CELLCEPT) 500 MG tablet 7/25/2022 Active   predniSONE (DELTASONE) 5 MG Tab 7/25/2022 Active   SITagliptin (JANUVIA) 50 MG Tab 7/25/2022 Active   tacrolimus (PROGRAF) 1 MG Cap 7/25/2022 Active              Current Facility-Administered Medications   Medication Dose Route Frequency Provider Last Rate Last Admin   • atorvastatin (LIPITOR) tablet 80 mg  80 mg Oral QHS Mark Winters M.D.   80 mg  at 07/25/22 2230   • [Held by provider] carvedilol (COREG) tablet 3.125 mg  3.125 mg Oral BID WITH MEALS Mark Winters M.D.       • gabapentin (NEURONTIN) capsule 600 mg  600 mg Oral QHS Mark Winters M.D.   600 mg at 07/25/22 2300   • mycophenolate (CELLCEPT) capsule 500 mg  500 mg Oral BID Mark Winters M.D.   500 mg at 07/25/22 2300   • tacrolimus (PROGRAF) capsule 1 mg  1 mg Oral BID Mark Winters M.D.   1 mg at 07/25/22 2300   • lactated ringers infusion   Intravenous Continuous Mark Winters M.D.       • acetaminophen (Tylenol) tablet 650 mg  650 mg Oral Q6HRS PRN Mark Winters M.D.       • meropenem (Merrem) 500 mg in  mL IV-MBP  500 mg Intravenous Q12HRS Mark Winters M.D.       • [START ON 7/26/2022] insulin regular (HumuLIN R,NovoLIN R) injection  3-15 Units Subcutaneous Q6HRS Mark Winters M.D.       • omeprazole (PRILOSEC) capsule 20 mg  20 mg Oral Q12HRS Carlo Avendano Jr., D.O.   20 mg at 07/25/22 2345   • [START ON 7/26/2022] hydrocortisone sodium succinate PF (Solu-CORTEF) 100 MG injection 100 mg  100 mg Intravenous Q8HRS Carlo Avendano Jr., D.O.       • [START ON 7/26/2022] midodrine (PROAMATINE) tablet 10 mg  10 mg Oral TID WITH MEALS MAGY Phan Jr..O.       • [START ON 7/26/2022] heparin injection 5,000 Units  5,000 Units Subcutaneous Q8HRS Carlo Avendano Jr. D.O.       • [START ON 7/26/2022] norepinephrine (Levophed) 8 mg in 250 mL NS infusion (premix)  0.5-30 mcg/min Intravenous Continuous Carlo Avendano Jr., D.O.        And   • [START ON 7/26/2022] vasopressin (VASOSTRICT) 20 Units in  mL Infusion  0.03 Units/min Intravenous Continuous MAGY Phan Jr..O.       • lactated ringers infusion (BOLUS)  500 mL Intravenous Once PRN MAGY Phan Jr..O.         Current Outpatient Medications   Medication Sig Dispense Refill   • Amino Acids-Protein Hydrolys (PRO-STAT) Liquid Take 30 mL by mouth every day.     • carvedilol  (COREG) 3.125 MG Tab Take 3.125 mg by mouth 2 times a day. Hold for sbp < 100   Hold for dbp < 60     • mycophenolate (CELLCEPT) 500 MG tablet Take 500 mg by mouth 2 times a day. 0800 2000     • meropenem (MERREM) 500 MG Recon Soln Infuse 500 mg into a venous catheter every 8 hours.     • acetaminophen (TYLENOL) 325 MG Tab Take 650 mg by mouth every four hours as needed for Mild Pain.     • cyclobenzaprine (FLEXERIL) 5 mg tablet Take 5-10 mg by mouth 3 times a day as needed for Muscle Spasms.     • midodrine (PROAMATINE) 5 MG Tab Take 5 mg by mouth every 8 hours as needed. Indications: Disorder of Low Blood Pressure     • gabapentin (NEURONTIN) 300 MG Cap Take 600 mg by mouth at bedtime.     • glyBURIDE (DIABETA) 5 MG Tab Take 10 mg by mouth every morning with breakfast. 2 tablets = 10 mg.     • carboxymethylcellulose (REFRESH TEARS) 0.5 % Solution Administer 1 Drop into both eyes 3 times a day.     • predniSONE (DELTASONE) 5 MG Tab Take 1 Tablet by mouth every day. 30 Tablet 0   • tacrolimus (PROGRAF) 1 MG Cap Take 1 Capsule by mouth 2 times a day. (Patient taking differently: Take 1 mg by mouth 2 times a day. 0800 2000) 60 Capsule 3   • apixaban (ELIQUIS) 5mg Tab Take 1 Tablet by mouth 2 times a day. Indications: Thromboembolism secondary to Atrial Fibrillation 60 Tablet    • atorvastatin (LIPITOR) 80 MG tablet Take 1 Tablet by mouth at bedtime. 90 Tablet 3   • SITagliptin (JANUVIA) 50 MG Tab Take 1 Tablet by mouth every day. 90 Tablet 3       Allergies  Allergies   Allergen Reactions   • Doxycycline Rash     Sweats and shakes: 9/28/17: Clarified allergy with patient. Allergy was in 1998 and he doesn't remember what happened. He thought the medication is for pain.  Tolerates doxycycline 9/2017       Vital Signs last 24 hours  Temp:  [38.5 °C (101.3 °F)-39.7 °C (103.5 °F)] 38.5 °C (101.3 °F)  Pulse:  [] 104  Resp:  [18-20] 20  BP: ()/(43-83) 124/68  SpO2:  [84 %-96 %] 96 %    Physical Exam  Physical  Exam  Vitals reviewed.   Constitutional:       Appearance: He is ill-appearing.   HENT:      Head: Normocephalic and atraumatic.      Right Ear: External ear normal.      Left Ear: External ear normal.      Mouth/Throat:      Mouth: Mucous membranes are moist.      Pharynx: Oropharynx is clear.   Eyes:      Extraocular Movements: Extraocular movements intact.      Conjunctiva/sclera: Conjunctivae normal.   Cardiovascular:      Rate and Rhythm: Tachycardia present.      Pulses: Normal pulses.   Pulmonary:      Effort: Pulmonary effort is normal. No respiratory distress.      Breath sounds: Normal breath sounds. No rales.   Abdominal:      General: There is no distension.      Palpations: Abdomen is soft.      Comments: Multiple surgical scars   Musculoskeletal:         General: Normal range of motion.      Right lower leg: Edema (trace) present.      Left lower leg: Edema (trace) present.   Skin:     General: Skin is warm and dry.      Capillary Refill: Capillary refill takes less than 2 seconds.      Findings: Lesion (Large wounds to the right lower extremity.  Dressing in place and no signs of infection.  Small wound to the left knee) present.   Neurological:      General: No focal deficit present.      Mental Status: He is alert and oriented to person, place, and time.      Cranial Nerves: No cranial nerve deficit.      Sensory: No sensory deficit.      Motor: No weakness.   Psychiatric:         Mood and Affect: Mood normal.         Behavior: Behavior normal.         Fluids  No intake or output data in the 24 hours ending 07/25/22 3157    Laboratory  Recent Results (from the past 48 hour(s))   Lactic acid (lactate)    Collection Time: 07/25/22  7:43 PM   Result Value Ref Range    Lactic Acid 2.8 (H) 0.5 - 2.0 mmol/L   CBC WITH DIFFERENTIAL    Collection Time: 07/25/22  7:43 PM   Result Value Ref Range    WBC 7.7 4.8 - 10.8 K/uL    RBC 4.11 (L) 4.70 - 6.10 M/uL    Hemoglobin 10.8 (L) 14.0 - 18.0 g/dL    Hematocrit  35.6 (L) 42.0 - 52.0 %    MCV 86.6 81.4 - 97.8 fL    MCH 26.3 (L) 27.0 - 33.0 pg    MCHC 30.3 (L) 33.7 - 35.3 g/dL    RDW 55.6 (H) 35.9 - 50.0 fL    Platelet Count 123 (L) 164 - 446 K/uL    MPV 11.6 9.0 - 12.9 fL    Neutrophils-Polys 94.00 (H) 44.00 - 72.00 %    Lymphocytes 4.00 (L) 22.00 - 41.00 %    Monocytes 0.80 0.00 - 13.40 %    Eosinophils 0.10 0.00 - 6.90 %    Basophils 0.30 0.00 - 1.80 %    Immature Granulocytes 0.80 0.00 - 0.90 %    Nucleated RBC 0.00 /100 WBC    Neutrophils (Absolute) 7.25 1.82 - 7.42 K/uL    Lymphs (Absolute) 0.31 (L) 1.00 - 4.80 K/uL    Monos (Absolute) 0.06 0.00 - 0.85 K/uL    Eos (Absolute) 0.01 0.00 - 0.51 K/uL    Baso (Absolute) 0.02 0.00 - 0.12 K/uL    Immature Granulocytes (abs) 0.06 0.00 - 0.11 K/uL    NRBC (Absolute) 0.00 K/uL   COMP METABOLIC PANEL    Collection Time: 07/25/22  7:43 PM   Result Value Ref Range    Sodium 136 135 - 145 mmol/L    Potassium 4.5 3.6 - 5.5 mmol/L    Chloride 101 96 - 112 mmol/L    Co2 21 20 - 33 mmol/L    Anion Gap 14.0 7.0 - 16.0    Glucose 100 (H) 65 - 99 mg/dL    Bun 28 (H) 8 - 22 mg/dL    Creatinine 2.03 (H) 0.50 - 1.40 mg/dL    Calcium 8.8 8.5 - 10.5 mg/dL    AST(SGOT) 20 12 - 45 U/L    ALT(SGPT) 22 2 - 50 U/L    Alkaline Phosphatase 103 (H) 30 - 99 U/L    Total Bilirubin 1.0 0.1 - 1.5 mg/dL    Albumin 3.9 3.2 - 4.9 g/dL    Total Protein 6.8 6.0 - 8.2 g/dL    Globulin 2.9 1.9 - 3.5 g/dL    A-G Ratio 1.3 g/dL   ESTIMATED GFR    Collection Time: 07/25/22  7:43 PM   Result Value Ref Range    GFR (CKD-EPI) 36 (A) >60 mL/min/1.73 m 2   URINALYSIS    Collection Time: 07/25/22  8:01 PM    Specimen: Urine, Clean Catch   Result Value Ref Range    Color Yellow     Character Cloudy (A)     Specific Gravity 1.018 <1.035    Ph 5.0 5.0 - 8.0    Glucose Negative Negative mg/dL    Ketones Trace (A) Negative mg/dL    Protein 100 (A) Negative mg/dL    Bilirubin Negative Negative    Urobilinogen, Urine 0.2 Negative    Nitrite Negative Negative    Leukocyte Esterase  Moderate (A) Negative    Occult Blood Moderate (A) Negative    Micro Urine Req Microscopic    URINE MICROSCOPIC (W/UA)    Collection Time: 07/25/22  8:01 PM   Result Value Ref Range    WBC Packed (A) /hpf    RBC 10-20 (A) /hpf    Bacteria Many (A) None /hpf    Epithelial Cells Negative /hpf    Hyaline Cast 11-20 (A) /lpf   Lactic acid (lactate): Repeat if initial lactic acid result is greater than 2    Collection Time: 07/25/22 10:05 PM   Result Value Ref Range    Lactic Acid 2.5 (H) 0.5 - 2.0 mmol/L       Imaging  CT-ABDOMEN-PELVIS WITH   Final Result         1.  Fluid-filled prominence of small bowel, consider ileus and/or enteritis. Radiographic follow-up to resolution recommended as clinically appropriate to exclude progression to obstructive changes.   2.  Periportal edema, nonspecific, can be associated with acute hepatitis in the appropriate clinical setting.   3.  Changes of the left kidney most compatible with polycystic kidney disease.   4.  3.0 cm fusiform infrarenal abdominal aortic aneurysm, radiographic follow-up and surveillance recommended as clinically appropriate.   5.  Fat-containing left inguinal hernia   6.  Atherosclerosis and atherosclerotic coronary artery disease      DX-CHEST-PORTABLE (1 VIEW)   Final Result      No significant interval change.          Assessment/Plan  Secondary adrenal insufficiency (HCC)- (present on admission)  Assessment & Plan  Due to chronic steroid use  Stress dose steroids    Immunosuppressed status (HCC)- (present on admission)  Assessment & Plan  On tacrolimus, Prograf and daily prednisone  Concurrently poorly controlled diabetes mellitus  With recurrent gram-negative bacteremia and urinary tract infections    Septic shock (HCC)- (present on admission)  Assessment & Plan  This is Septic shock Present on admission  SIRS criteria identified on my evaluation include: Fever, with temperature greater than 101 deg F, Tachycardia, with heart rate greater than 90 BPM and  Tachypnea, with respirations greater than 20 per minute  Indicators of septic shock include: Severe sepsis present and persistent hypotension after 30 ml/kg completed.   Sources is: UTI  Sepsis protocol initiated  Crystalloid Fluid Administration: Fluid resuscitation ordered per standard protocol - 30 mL/kg per current or ideal body weight  IV antibiotics as appropriate for source of sepsis  Reassessment: I have reassessed the patient's hemodynamic status  Stress dose steroids given likely secondary adrenal insufficiency due to chronic steroid use  PRN IVF bolus to maintain MAP >65 mmHg  Pressors if needed to maintain MAP >65 mmHg  F/u blood, respiratory and urine cultures  lactate every 4 hours until normalized or downtrending    Urinary tract infection- (present on admission)  Assessment & Plan  CT scan without signs of obstruction or perinephric inflammation  History of ESBL E. Coli  Continue meropenem  Consult [facility] in the AM for further Abx recommendations     Wound of right lower extremity- (present on admission)  Assessment & Plan  Undergoing wound care at [facility]  Wound care consultation    PAF (paroxysmal atrial fibrillation) ([facility])- (present on admission)  Assessment & Plan  Currently in sinus rhythm on telemetry  Hold Coreg given hypotension  Will hold Eliquis given SANKET, restart when able    Diabetes mellitus with coincident hypertension ([facility])- (present on admission)  Assessment & Plan  Type II diabetes due to prolonged use of steroids  Goal blood glucose 140-180  Sliding scale insulin, accuchecks  hypoglycemia protocol  A1c 10.7 two months ago    GERD (gastroesophageal reflux disease)- (present on admission)  Assessment & Plan  Continue home omeprazole    Thrombocytopenia (HCC)- (present on admission)  Assessment & Plan  Likely due to sepsis, no signs of bleeding  Continue to monitor    SANKET (acute kidney injury) ([facility])- (present on admission)  Assessment & Plan  History of renal transplant  12 years ago, transiently needed HD last month  Continue aggressive crystalloid resuscitation  Renal dose meds, avoid nephrotoxins  Strict I/Os  Follow renal function  Consider inpatient nephrology consultation should the patient worsen, patient follows with [facility], Dr. León    Hyperlipidemia- (present on admission)  Assessment & Plan  Continue statin    History of renal transplant- (present on admission)  Assessment & Plan  On Prograf, tacrolimus and prednisone  Continue Prograf and tacrolimus  Stress dose steroids      Discussed patient condition and risk of morbidity and/or mortality with Hospitalist, Family, RN, RT, Pharmacy, Code status disscussed, Charge nurse / hot rounds, Patient and ERP.      The patient remains critically ill.  Critical care time = 39 minutes in directly providing and coordinating critical care and extensive data review.  No time overlap and excludes procedures.

## 2022-07-26 NOTE — ED NOTES
PIV estsablished, labs and both sets of blood cultures drawn, patient medicated per MAR.  Family at bedside  First 2 liters of LR started, one on pressure bag

## 2022-07-26 NOTE — ASSESSMENT & PLAN NOTE
ESBL E. Coli on blood and urine cultures  Continue meropenem, renally adjust  If recurrent after this treatment he will need urology consultation  ID consulted  Cultures pending

## 2022-07-26 NOTE — ED NOTES
Occupational Therapy  Visit Type: re-evaluation  Precautions:  Medical precautions:  fall risk; droplet precautions and contact precautions.    Lines:     Basic: IV      Lines in chart and on patient reviewed, cautions maintained throughout session.  Safety Measures: bed alarm    SUBJECTIVE  Patient agreed to participate in therapy this date.  Patient / Family Goal: return home    Pain     At onset of session (out of 10): 0    OBJECTIVE   Level of consciousness: alert      Disoriented to time  Range of Motion (measured in degrees unless otherwise indicated)  ROM Details: Bilateral hands min LOM d/t swelling, bilateral wrists decreased extension, elbows WFL, bilateral shoulders MOD LOM  Strength (out of 5 unless otherwise indicated)  Finger/Thumb:  Gross :  strength grossly equal bilateral  Coordination  Gross Motor:  LUE: slow movement and effortful movement  RUE: slow movement and effortful movement  Fine Motor:  LUE: tremors RUE: tremors  Bed mobility:    Rolling left: total assist - dependent and 2 persons    Rolling right: total assist - dependent and 2 persons  Activities of Daily Living (ADLs):  Eating:     Assist: total assist - dependent  Grooming/Oral Hygiene:     Grooming assist: total assist - dependent  Upper Body Dressing:    Assist: total assist - dependent  Lower Body Dressing:     Assist: total assist - dependent  Toileting:     Assist: total assist - dependent  Bathing:     Assist: total assist - dependent      Interventions      Additional exercise details: AAROM exercises to hands, wrist, elbows 1x10 to increase strength and endurance for ADLs and fxnl mobility   Training provided: bed mobility training and positioningRehab Safety  Therapist donned the following PPE for this patient encounter:  Gloves, Gown, N95 Respirator Mask, Face Shield and Hair Cap    Therapy aide or other healthcare professional present during this patient encounter:   No  If yes, that healthcare professional wore  Levo started at 10 mcg per MD.   the following PPE: Not Applicable    The patient was wearing a mask during this session:  No      Skilled input: verbal instruction/cues  Verbal Consent: Writer verbally educated and received verbal consent for hand placement, positioning of patient, and techniques to be performed today from patient for clothing adjustments for techniques as described above and how they are pertinent to the patient's plan of care.        ASSESSMENT    Impairments: activity tolerance, bed mobility, range of motion and strength  Functional Limitations: bed mobility, functional mobility, eating, grooming, bathing, toileting, showering, dressing, functional transfers and IADLs     Discharge Recommendations:   Recommendations for Discharge: OT IL: Patient needs daily, skilled therapy for 1-3 hours a day by at least two disciplines               Skilled therapy is required to address these limitations in attempt to maximize the patient's independence.  Progress: slow progress, medical status limitations    End of Session:   Location: in bed  Safety measures: alarm system in place/re-engaged, call light within reach, bed rails x4 and equipment intact  Handoff to: nurse    PLAN  Suggestions for next session as indicated: OT Frequency: 3 days/week  Frequency Comments: 05/18 Re-eval KV 2/3    Interventions: activity tolerance training, ADL retraining, balance, bed mobility training, functional transfer training, patient/family training, safety training, therapeutic exercise, coordination and transfer training  Agreement to plan and goals: patient agrees with goals and treatment plan      GOALS  Long Term Goals: (to be met by time of discharge from hospital)  Feeding: Patient will complete feeding tasks moderate assist.  Status: revised, this goal modified  Grooming: Patient will complete grooming tasks moderate assist. Status: revised, this goal modified  Upper body dressing: Patient will complete upper body dressing maximal assist. Status:  revised, this goal modified  Lower body dressing: Patient will complete lower body dressing maximal assist. Status: revised, this goal modified  Toileting: Patient will complete toileting maximal assist.  Status: revised, this goal modified  Bathing: Patient will complete bathingmaximal assist  Status: revised, this goal modified  Pt will participate in toilet t/f assessment, goals to be determined   Documented in the chart in the following areas: Assessment. Plan.      Therapy procedure time and total treatment time can be found documented on the Time Entry flowsheet

## 2022-07-26 NOTE — ASSESSMENT & PLAN NOTE
Monitor accuchecks and cover with sliding scale insulin  Diabetic diet  Holding outpatient Januvia and glyburide

## 2022-07-26 NOTE — ED NOTES
Phleb at bedside to draw lactic  Report called to LAN Das  Patient transported to R110-00 by Trauma RN

## 2022-07-27 LAB
ALBUMIN SERPL BCP-MCNC: 2.6 G/DL (ref 3.2–4.9)
ALBUMIN/GLOB SERPL: 1 G/DL
ALP SERPL-CCNC: 82 U/L (ref 30–99)
ALT SERPL-CCNC: 12 U/L (ref 2–50)
ANION GAP SERPL CALC-SCNC: 11 MMOL/L (ref 7–16)
AST SERPL-CCNC: 12 U/L (ref 12–45)
BASOPHILS # BLD AUTO: 0.2 % (ref 0–1.8)
BASOPHILS # BLD: 0.02 K/UL (ref 0–0.12)
BILIRUB SERPL-MCNC: 0.3 MG/DL (ref 0.1–1.5)
BUN SERPL-MCNC: 30 MG/DL (ref 8–22)
CALCIUM SERPL-MCNC: 8.1 MG/DL (ref 8.5–10.5)
CHLORIDE SERPL-SCNC: 101 MMOL/L (ref 96–112)
CO2 SERPL-SCNC: 21 MMOL/L (ref 20–33)
CREAT SERPL-MCNC: 1.44 MG/DL (ref 0.5–1.4)
EOSINOPHIL # BLD AUTO: 0 K/UL (ref 0–0.51)
EOSINOPHIL NFR BLD: 0 % (ref 0–6.9)
ERYTHROCYTE [DISTWIDTH] IN BLOOD BY AUTOMATED COUNT: 54.6 FL (ref 35.9–50)
GFR SERPLBLD CREATININE-BSD FMLA CKD-EPI: 54 ML/MIN/1.73 M 2
GLOBULIN SER CALC-MCNC: 2.5 G/DL (ref 1.9–3.5)
GLUCOSE BLD STRIP.AUTO-MCNC: 199 MG/DL (ref 65–99)
GLUCOSE BLD STRIP.AUTO-MCNC: 216 MG/DL (ref 65–99)
GLUCOSE BLD STRIP.AUTO-MCNC: 273 MG/DL (ref 65–99)
GLUCOSE BLD STRIP.AUTO-MCNC: 335 MG/DL (ref 65–99)
GLUCOSE SERPL-MCNC: 232 MG/DL (ref 65–99)
HCT VFR BLD AUTO: 29.1 % (ref 42–52)
HGB BLD-MCNC: 8.9 G/DL (ref 14–18)
IMM GRANULOCYTES # BLD AUTO: 0.07 K/UL (ref 0–0.11)
IMM GRANULOCYTES NFR BLD AUTO: 0.6 % (ref 0–0.9)
LYMPHOCYTES # BLD AUTO: 0.46 K/UL (ref 1–4.8)
LYMPHOCYTES NFR BLD: 3.8 % (ref 22–41)
MAGNESIUM SERPL-MCNC: 2.1 MG/DL (ref 1.5–2.5)
MCH RBC QN AUTO: 26.4 PG (ref 27–33)
MCHC RBC AUTO-ENTMCNC: 30.6 G/DL (ref 33.7–35.3)
MCV RBC AUTO: 86.4 FL (ref 81.4–97.8)
MONOCYTES # BLD AUTO: 0.67 K/UL (ref 0–0.85)
MONOCYTES NFR BLD AUTO: 5.5 % (ref 0–13.4)
NEUTROPHILS # BLD AUTO: 10.86 K/UL (ref 1.82–7.42)
NEUTROPHILS NFR BLD: 89.9 % (ref 44–72)
NRBC # BLD AUTO: 0 K/UL
NRBC BLD-RTO: 0 /100 WBC
PHOSPHATE SERPL-MCNC: 3.2 MG/DL (ref 2.5–4.5)
PLATELET # BLD AUTO: 140 K/UL (ref 164–446)
PMV BLD AUTO: 11.5 FL (ref 9–12.9)
POTASSIUM SERPL-SCNC: 4.5 MMOL/L (ref 3.6–5.5)
PROT SERPL-MCNC: 5.1 G/DL (ref 6–8.2)
RBC # BLD AUTO: 3.37 M/UL (ref 4.7–6.1)
SODIUM SERPL-SCNC: 133 MMOL/L (ref 135–145)
WBC # BLD AUTO: 12.1 K/UL (ref 4.8–10.8)

## 2022-07-27 PROCEDURE — 97602 WOUND(S) CARE NON-SELECTIVE: CPT

## 2022-07-27 PROCEDURE — 83735 ASSAY OF MAGNESIUM: CPT

## 2022-07-27 PROCEDURE — 84100 ASSAY OF PHOSPHORUS: CPT

## 2022-07-27 PROCEDURE — 97161 PT EVAL LOW COMPLEX 20 MIN: CPT

## 2022-07-27 PROCEDURE — 700111 HCHG RX REV CODE 636 W/ 250 OVERRIDE (IP): Performed by: INTERNAL MEDICINE

## 2022-07-27 PROCEDURE — A9270 NON-COVERED ITEM OR SERVICE: HCPCS | Performed by: HOSPITALIST

## 2022-07-27 PROCEDURE — A9270 NON-COVERED ITEM OR SERVICE: HCPCS | Performed by: INTERNAL MEDICINE

## 2022-07-27 PROCEDURE — 770001 HCHG ROOM/CARE - MED/SURG/GYN PRIV*

## 2022-07-27 PROCEDURE — 700111 HCHG RX REV CODE 636 W/ 250 OVERRIDE (IP): Performed by: STUDENT IN AN ORGANIZED HEALTH CARE EDUCATION/TRAINING PROGRAM

## 2022-07-27 PROCEDURE — 700102 HCHG RX REV CODE 250 W/ 637 OVERRIDE(OP): Performed by: HOSPITALIST

## 2022-07-27 PROCEDURE — 85025 COMPLETE CBC W/AUTO DIFF WBC: CPT

## 2022-07-27 PROCEDURE — 99233 SBSQ HOSP IP/OBS HIGH 50: CPT | Performed by: HOSPITALIST

## 2022-07-27 PROCEDURE — 82962 GLUCOSE BLOOD TEST: CPT | Mod: 91

## 2022-07-27 PROCEDURE — 700105 HCHG RX REV CODE 258: Performed by: INTERNAL MEDICINE

## 2022-07-27 PROCEDURE — 700102 HCHG RX REV CODE 250 W/ 637 OVERRIDE(OP): Performed by: STUDENT IN AN ORGANIZED HEALTH CARE EDUCATION/TRAINING PROGRAM

## 2022-07-27 PROCEDURE — A9270 NON-COVERED ITEM OR SERVICE: HCPCS | Performed by: STUDENT IN AN ORGANIZED HEALTH CARE EDUCATION/TRAINING PROGRAM

## 2022-07-27 PROCEDURE — 80053 COMPREHEN METABOLIC PANEL: CPT

## 2022-07-27 PROCEDURE — 700102 HCHG RX REV CODE 250 W/ 637 OVERRIDE(OP): Performed by: INTERNAL MEDICINE

## 2022-07-27 PROCEDURE — 700105 HCHG RX REV CODE 258: Performed by: HOSPITALIST

## 2022-07-27 PROCEDURE — 700111 HCHG RX REV CODE 636 W/ 250 OVERRIDE (IP): Performed by: HOSPITALIST

## 2022-07-27 RX ORDER — ERGOCALCIFEROL 1.25 MG/1
50000 CAPSULE ORAL
Status: DISCONTINUED | OUTPATIENT
Start: 2022-07-27 | End: 2022-08-01 | Stop reason: HOSPADM

## 2022-07-27 RX ORDER — MIDODRINE HYDROCHLORIDE 5 MG/1
5 TABLET ORAL EVERY 8 HOURS
Status: DISCONTINUED | OUTPATIENT
Start: 2022-07-27 | End: 2022-07-29

## 2022-07-27 RX ADMIN — TACROLIMUS 1 MG: 1 CAPSULE ORAL at 17:12

## 2022-07-27 RX ADMIN — CALCIUM CARBONATE 500 MG: 500 TABLET, CHEWABLE ORAL at 05:31

## 2022-07-27 RX ADMIN — MYCOPHENOLATE MOFETIL 500 MG: 250 CAPSULE ORAL at 05:33

## 2022-07-27 RX ADMIN — MIDODRINE HYDROCHLORIDE 10 MG: 5 TABLET ORAL at 05:30

## 2022-07-27 RX ADMIN — INSULIN HUMAN 4 UNITS: 100 INJECTION, SOLUTION PARENTERAL at 05:26

## 2022-07-27 RX ADMIN — OMEPRAZOLE 20 MG: 20 CAPSULE, DELAYED RELEASE ORAL at 05:30

## 2022-07-27 RX ADMIN — MIDODRINE HYDROCHLORIDE 5 MG: 5 TABLET ORAL at 13:15

## 2022-07-27 RX ADMIN — MEROPENEM 500 MG: 500 INJECTION, POWDER, FOR SOLUTION INTRAVENOUS at 12:03

## 2022-07-27 RX ADMIN — ATORVASTATIN CALCIUM 80 MG: 80 TABLET, FILM COATED ORAL at 21:15

## 2022-07-27 RX ADMIN — INSULIN HUMAN 10 UNITS: 100 INJECTION, SOLUTION PARENTERAL at 13:13

## 2022-07-27 RX ADMIN — INSULIN HUMAN 7 UNITS: 100 INJECTION, SOLUTION PARENTERAL at 00:11

## 2022-07-27 RX ADMIN — MEROPENEM 500 MG: 500 INJECTION, POWDER, FOR SOLUTION INTRAVENOUS at 05:31

## 2022-07-27 RX ADMIN — ERGOCALCIFEROL 50000 UNITS: 1.25 CAPSULE ORAL at 10:03

## 2022-07-27 RX ADMIN — SODIUM CHLORIDE, POTASSIUM CHLORIDE, SODIUM LACTATE AND CALCIUM CHLORIDE: 600; 310; 30; 20 INJECTION, SOLUTION INTRAVENOUS at 06:14

## 2022-07-27 RX ADMIN — HYDROCORTISONE SODIUM SUCCINATE 50 MG: 100 INJECTION, POWDER, FOR SOLUTION INTRAMUSCULAR; INTRAVENOUS at 17:13

## 2022-07-27 RX ADMIN — INSULIN HUMAN 3 UNITS: 100 INJECTION, SOLUTION PARENTERAL at 18:03

## 2022-07-27 RX ADMIN — MEROPENEM 500 MG: 500 INJECTION, POWDER, FOR SOLUTION INTRAVENOUS at 23:42

## 2022-07-27 RX ADMIN — GABAPENTIN 600 MG: 300 CAPSULE ORAL at 21:14

## 2022-07-27 RX ADMIN — TACROLIMUS 1 MG: 1 CAPSULE ORAL at 05:32

## 2022-07-27 RX ADMIN — APIXABAN 5 MG: 5 TABLET, FILM COATED ORAL at 17:12

## 2022-07-27 RX ADMIN — MYCOPHENOLATE MOFETIL 500 MG: 250 CAPSULE ORAL at 17:12

## 2022-07-27 RX ADMIN — MIDODRINE HYDROCHLORIDE 5 MG: 5 TABLET ORAL at 21:14

## 2022-07-27 RX ADMIN — INSULIN HUMAN 3 UNITS: 100 INJECTION, SOLUTION PARENTERAL at 23:43

## 2022-07-27 RX ADMIN — HYDROCORTISONE SODIUM SUCCINATE 100 MG: 100 INJECTION, POWDER, FOR SOLUTION INTRAMUSCULAR; INTRAVENOUS at 05:30

## 2022-07-27 RX ADMIN — MEROPENEM 500 MG: 500 INJECTION, POWDER, FOR SOLUTION INTRAVENOUS at 17:13

## 2022-07-27 RX ADMIN — APIXABAN 5 MG: 5 TABLET, FILM COATED ORAL at 05:31

## 2022-07-27 ASSESSMENT — GAIT ASSESSMENTS
DISTANCE (FEET): 350
GAIT LEVEL OF ASSIST: SUPERVISED

## 2022-07-27 ASSESSMENT — ENCOUNTER SYMPTOMS
HEADACHES: 0
BACK PAIN: 0
STRIDOR: 0
SPEECH CHANGE: 0
DIARRHEA: 0
PALPITATIONS: 0
NERVOUS/ANXIOUS: 0
SHORTNESS OF BREATH: 0
COUGH: 0
EYE DISCHARGE: 0
NAUSEA: 0
ABDOMINAL PAIN: 0
CHILLS: 1
VOMITING: 0
FEVER: 0

## 2022-07-27 ASSESSMENT — COGNITIVE AND FUNCTIONAL STATUS - GENERAL
SUGGESTED CMS G CODE MODIFIER MOBILITY: CI
MOBILITY SCORE: 23
CLIMB 3 TO 5 STEPS WITH RAILING: A LITTLE

## 2022-07-27 ASSESSMENT — FIBROSIS 4 INDEX: FIB4 SCORE: 2.37

## 2022-07-27 ASSESSMENT — PAIN DESCRIPTION - PAIN TYPE
TYPE: ACUTE PAIN

## 2022-07-27 ASSESSMENT — PATIENT HEALTH QUESTIONNAIRE - PHQ9
1. LITTLE INTEREST OR PLEASURE IN DOING THINGS: NOT AT ALL
SUM OF ALL RESPONSES TO PHQ9 QUESTIONS 1 AND 2: 0
2. FEELING DOWN, DEPRESSED, IRRITABLE, OR HOPELESS: NOT AT ALL

## 2022-07-27 NOTE — THERAPY
"Physical Therapy   Initial Evaluation     Patient Name: Jama Altman  Age:  66 y.o., Sex:  male  Medical Record #: [id number]  Today's Date: 7/27/2022          Assessment  Patient is 66 y.o. male that presented [facility] with medical dx of septic shock. PMHx significant for HTN, polycystic kidney disease, ESRD s/p transplant, HLD, DM. He performed bed mobility, transfers, and community distance ambulation at supervision level. He reported no concerns regarding mobility or returning home following medical DC. Daughter at bedside and supportive, is able to assist as needed; patient reported not needing any assist. Patient will not be actively followed for physical therapy services at this time, however may be seen if requested by physician for 1 more visit within 30 days to address any discharge or equipment needs.    Plan    Recommend Physical Therapy for Evaluation only    DC Equipment Recommendations: None  Discharge Recommendations: Anticipate that the patient will have no further physical therapy needs after discharge from the hospital       Subjective    \"I'm ready to go home.\"     Objective       07/27/22 1140   Charge Group   PT Evaluation PT Evaluation Low  (33 min)   Initial Contact Note    Initial Contact Note Order Received and Verified, Evaluation Only - Patient Does Not Require Further Acute Physical Therapy at this Time.  However, May Benefit from Post Acute Therapy for Higher Level Functional Deficits.   Vitals   O2 (LPM) 0   O2 Delivery Device None - Room Air   Pain 0 - 10 Group   Therapist Pain Assessment   (no pain complaint during session)   Prior Living Situation   Prior Services None   Housing / Facility 1 Story House   Steps Into Home 2   Steps In Home 0   Equipment Owned None   Lives with - Patient's Self Care Capacity Alone and Able to Care For Self   Comments Daughter at bedside and supportive and staying wiuth patient temporarily   Prior Level of Functional Mobility   Bed Mobility " Independent   Transfer Status Independent   Ambulation Independent   Distance Ambulation (Feet)   (community)   Assistive Devices Used None   Stairs Independent   Cognition    Cognition / Consciousness WDL   Level of Consciousness Alert   Comments pleasant, cooperative, motivated to return home   Passive ROM Lower Body   Passive ROM Lower Body WDL   Comments not formally tested, WFL for mobility   Active ROM Lower Body    Active ROM Lower Body  WDL   Comments as above   Strength Lower Body   Lower Body Strength  WDL   Comments as above   Sensation Lower Body   Lower Extremity Sensation   Not Tested   Lower Body Muscle Tone   Lower Body Muscle Tone  WDL   Coordination Lower Body    Coordination Lower Body  WDL   Balance Assessment   Sitting Balance (Static) Good   Sitting Balance (Dynamic) Good   Standing Balance (Static) Fair +   Standing Balance (Dynamic) Fair   Weight Shift Sitting Good   Weight Shift Standing Good   Comments no AD, no LOB   Gait Analysis   Gait Level Of Assist Supervised   Assistive Device None   Distance (Feet) 350   # of Times Distance was Traveled 1   Deviation   (inconsistent TD and step length)   # of Stairs Climbed 0   Weight Bearing Status no restrictions   Vision Deficits Impacting Mobility NT   Bed Mobility    Supine to Sit Supervised   Sit to Supine   (NT, left in chair)   Scooting Supervised  (seated)   Functional Mobility   Sit to Stand Supervised   Bed, Chair, Wheelchair Transfer Supervised   Toilet Transfers Supervised   Transfer Method Stand Step   ICU Target Mobility Level   ICU Mobility - Targeted Level Level 4   How much difficulty does the patient currently have...   Turning over in bed (including adjusting bedclothes, sheets and blankets)? 4   Sitting down on and standing up from a chair with arms (e.g., wheelchair, bedside commode, etc.) 4   Moving from lying on back to sitting on the side of the bed? 4   How much help from another person does the patient currently need...    Moving to and from a bed to a chair (including a wheelchair)? 4   Need to walk in a hospital room? 4   Climbing 3-5 steps with a railing? 3   6 clicks Mobility Score 23   Activity Tolerance   Sitting in Chair 5 min on toilet   Sitting Edge of Bed 6 min   Standing 8 min   Comments no overt pain, fatigue, SOB, dizziness   Edema / Skin Assessment   Edema / Skin  Not Assessed   Education Group   Education Provided Role of Physical Therapist   Role of Physical Therapist Patient Response Patient;Family;Acceptance;Explanation;Verbal Demonstration   Anticipated Discharge Equipment and Recommendations   DC Equipment Recommendations None   Discharge Recommendations Anticipate that the patient will have no further physical therapy needs after discharge from the hospital   Interdisciplinary Plan of Care Collaboration   IDT Collaboration with  Nursing;Family / Caregiver   Patient Position at End of Therapy Seated;Edge of Bed;Family / Friend in Room   Collaboration Comments RN aware of visit, response   Session Information   Date / Session Number  7/27 - 1x only

## 2022-07-27 NOTE — CARE PLAN
The patient is Watcher - Medium risk of patient condition declining or worsening    Shift Goals  Clinical Goals: stable vital signs, remain afebrile, abx as ordered, pending final cultures. re-cultured today per ID  Patient Goals: discharge home soon  Family Goals: updates    Progress made toward(s) clinical / shift goals:    Problem: Knowledge Deficit - Standard  Goal: Patient and family/care givers will demonstrate understanding of plan of care, disease process/condition, diagnostic tests and medications  Outcome: Progressing       Patient is not progressing towards the following goals:

## 2022-07-27 NOTE — PROGRESS NOTES
"[facility] -  PROGRESS NOTE               Author: Roderick Ramirez M.D. Date & Time: 7/27/2022  9:20 AM     HPI:  Patient is a 66 year old male with a PMHx of HTN, polycystic kidney disease with [place]D s/p renal [place] [facility] [place]s, HLD, DM, and recent ESBL e coli bacteremia here for rigors and fatigue.  Had dysuria with decreased UOP.  Was admitted to ICU and started on pressors for hypotension.  Started on IV abx.   Found to have GNR on blood cultures.  This morning off of pressors.  Feels better.  No pain or SOB.  Dysuria resolving.  Creatinine down to 1.8.      DAILY NEPHROLOGY SUMMARY:  7/27: Feeling better.  Denies pain or Sob.  Eating breakfast    REVIEW OF SYSTEMS:    10 point ROS reviewed and is as per HPI/daily summary or otherwise negative    PMH/PSH/SH/FH:   Reviewed and unchanged since admission note    CURRENT MEDICATIONS:   Reviewed from admission to present day    VS:  /79   Pulse (!) 52   Temp 35.9 °C (96.7 °F)   Resp 14   Ht 1.969 m (6' 5.5\")   Wt 107 kg (235 lb 3.7 oz)   SpO2 97%   BMI 27.54 kg/m²     Physical Exam  Constitutional:       Appearance: He is ill-appearing.   HENT:      Head: Normocephalic.      Right Ear: External ear normal.      Left Ear: External ear normal.      Nose: Nose normal.      Mouth/Throat:      Mouth: Mucous membranes are dry.   Eyes:      General:         Right eye: No discharge.         Left eye: No discharge.   Cardiovascular:      Pulses: Normal pulses.   Abdominal:      General: Abdomen is flat.      Palpations: Abdomen is soft.   Musculoskeletal:         General: No swelling.      Cervical back: Normal range of motion.   Skin:     General: Skin is warm.   Neurological:      General: No focal deficit present.      Mental Status: He is alert.   Psychiatric:         Mood and Affect: Mood normal.   Fluids:  In: 1812.9 [P.O.:200; I.V.:1559.6]  Out: 2475     LABS:  Recent Labs     07/26/22  1020 07/26/22  1315 " 07/27/22  0415   SODIUM * 133*   POTASSIUM RR 5.0 4.5   CHLORIDE  101   CO2 RR 21 21   GLUCOSE * 232*   BUN RR 29* 30*   CREATININE RR 1.68* 1.44*   CALCIUM 8.4* 8.2* 8.1*       IMAGING:   All imaging reviewed from admission to present day    IMPRESSION:  # SANKET    - Likely multifactorial with sepsis and pre-renal etiologies    - Improving  # Renal Transplant    - [place] [facility] [place]    - On IS meds, being continued  # Bacteremia    - On Meropenem    - Cultures pending  # Septic Shock  # UTI  # Right lower extremity wound  # PAF  # DM  # GERD  # Thrombocytopenia  # CKD-MBD    - Hypocalcemia - resolved    - Phos 2.8    - Check vitamin D 27 and PTH 44  # Anemia  # Hyponatremia    - Mild, monitor        PLAN:  - No compelling indication for RRT  - SANKET resolving  - Continue home IS meds, on stress dose steroids, resume home pred when done  - Stop Tums  - Abx per primary team  - Ergo qweek  - Dose all meds per eGFR      Thank you for the consultation!

## 2022-07-27 NOTE — CARE PLAN
The patient is Watcher - Medium risk of patient condition declining or worsening    Shift Goals  Clinical Goals: stable vital signs, remain afebrile, abx as ordered, pending final cultures. re-cultured today per ID  Patient Goals: discharge home soon  Family Goals: updates    Progress made toward(s) clinical / shift goals:  vital signs remain stable, pressors have been paused since this AM and MAPs have remained > 65. Pt is afebrile. Blood cultures successfully drawn. Pt refused SCDs despite education stating that he is already on medications to prevent blood clots. Discussed need to have long term plan for recurrent infections prior to d/c home. Pt understanding. Family and pt updated about new orders for contact isolation; preventative at this time given history, pending final cultures.     Patient is not progressing towards the following goals:  N/A       DR Jenniffer Espino

## 2022-07-27 NOTE — PROGRESS NOTES
Hospital Medicine Daily Progress Note    Date of Service  7/27/2022    Chief Complaint  Difficulties urinating, chills and generalized weakness over the last several days    Hospital Course  Jama Altman is a 66 y.o. male with a history of diabetes, polycystic kidney disease and had a subsequent renal transplant in 2010 and has been on immunosuppressant.  Of significance he was recently admitted in early July of this year for extended spectrum beta-lactamase E. coli bacteremia secondary to urinary source and had been on meropenem.  The patient was admitted 7/25/2022 with recurrent sepsis from UTI.  He was found to have recurrent gram-negative bacteremia.  Infectious disease has been consulted and have him initiated on meropenem again.    Interval Problem Update  7/27: Hgb:8.9, WBC:12.1, Cr:1.44, BUN:30, Alb:2.6, A1c:6.2. 7/26 Blood cultures remain without growth.  7/25 BC GNR but no ID/Sen yet. Wean hydrocortisone and midodrine as BP increasing.       7/26: Patient is alert and awake.  Questions why he has recurrent infection despite recent treatment with antibiotics.  Patient states he is making urine.  He does have chronic wounds on his right lower extremity for which she has been seeing wound care he states that these wounds are improving.  Patient is being transferred to IM ICU stepdown as he is just off pressor support this morning and initiated on midodrine.  I discussed with nursing prior to the patient's transfer we are decreasing his IV fluids from 150 cc an hour to 100.     I have discussed this patient's plan of care and discharge plan at IDT rounds today with Case Management, Nursing, Nursing leadership, and other members of the IDT team.    Consultants/Specialty  infectious disease    Code Status  Full Code    Disposition  Patient is not medically cleared for discharge.   Anticipate discharge to to home with close outpatient follow-up.  I have placed the appropriate orders for post-discharge  needs.    Review of Systems  Review of Systems   Constitutional: Positive for chills (it got cold here last night). Negative for fever.   Eyes: Negative for discharge.   Respiratory: Negative for cough, shortness of breath and stridor.    Cardiovascular: Negative for chest pain, palpitations and leg swelling.   Gastrointestinal: Negative for abdominal pain, diarrhea, nausea and vomiting.   Genitourinary: Positive for frequency. Negative for dysuria and hematuria.   Musculoskeletal: Negative for back pain and joint pain.   Neurological: Negative for speech change and headaches.   Psychiatric/Behavioral: The patient is not nervous/anxious.         Physical Exam  Temp:  [35.7 °C (96.2 °F)-35.9 °C (96.7 °F)] 35.9 °C (96.7 °F)  Pulse:  [47-60] 52  Resp:  [9-33] 14  BP: ()/(51-87) 94/51  SpO2:  [92 %-100 %] 97 %    Physical Exam  Vitals reviewed.   Constitutional:       Appearance: Normal appearance. He is not diaphoretic.   HENT:      Head: Normocephalic and atraumatic.      Nose: Nose normal.      Mouth/Throat:      Mouth: Mucous membranes are moist.      Pharynx: No oropharyngeal exudate.   Eyes:      General: No scleral icterus.        Right eye: No discharge.         Left eye: No discharge.      Extraocular Movements: Extraocular movements intact.      Conjunctiva/sclera: Conjunctivae normal.   Cardiovascular:      Rate and Rhythm: Normal rate and regular rhythm.      Pulses:           Radial pulses are 2+ on the right side and 2+ on the left side.        Dorsalis pedis pulses are 2+ on the right side and 2+ on the left side.      Heart sounds: No murmur heard.  Pulmonary:      Effort: Pulmonary effort is normal. No respiratory distress.      Breath sounds: Normal breath sounds. No wheezing or rales.   Abdominal:      General: Bowel sounds are normal. There is no distension.      Palpations: Abdomen is soft.   Musculoskeletal:         General: No swelling or tenderness.      Cervical back: Neck supple. No  muscular tenderness.      Right lower leg: No edema.      Left lower leg: No edema.   Lymphadenopathy:      Cervical: No cervical adenopathy.   Skin:     Coloration: Skin is not jaundiced or pale.      Comments: Right lower leg wound covered with clean gauze.  Left knee with healing abraision.   Neurological:      General: No focal deficit present.      Mental Status: He is alert and oriented to person, place, and time. Mental status is at baseline.      Cranial Nerves: No cranial nerve deficit.   Psychiatric:         Mood and Affect: Mood normal.         Behavior: Behavior normal.         Fluids    Intake/Output Summary (Last 24 hours) at 7/27/2022 1324  Last data filed at 7/27/2022 0614  Gross per 24 hour   Intake 653.33 ml   Output 1400 ml   Net -746.67 ml       Laboratory  Recent Labs     07/25/22 1943 07/27/22  0415   WBC 7.7 12.1*   RBC 4.11* 3.37*   HEMOGLOBIN 10.8* 8.9*   HEMATOCRIT 35.6* 29.1*   MCV 86.6 86.4   MCH 26.3* 26.4*   MCHC 30.3* 30.6*   RDW 55.6* 54.6*   PLATELETCT 123* 140*   MPV 11.6 11.5     Recent Labs     07/26/22  1020 07/26/22  1315 07/27/22  0415   SODIUM * 133*   POTASSIUM RR 5.0 4.5   CHLORIDE  101   CO2 RR 21 21   GLUCOSE * 232*   BUN RR 29* 30*   CREATININE RR 1.68* 1.44*   CALCIUM 8.4* 8.2* 8.1*     Recent Labs     07/25/22 1943   INR 1.38*               Imaging  CT-ABDOMEN-PELVIS WITH   Final Result         1.  Fluid-filled prominence of small bowel, consider ileus and/or enteritis. Radiographic follow-up to resolution recommended as clinically appropriate to exclude progression to obstructive changes.   2.  Periportal edema, nonspecific, can be associated with acute hepatitis in the appropriate clinical setting.   3.  Changes of the left kidney most compatible with polycystic kidney disease.   4.  3.0 cm fusiform infrarenal abdominal aortic aneurysm, radiographic follow-up and surveillance recommended as clinically appropriate.   5.  Fat-containing left inguinal  hernia   6.  Atherosclerosis and atherosclerotic coronary artery disease      DX-CHEST-PORTABLE (1 VIEW)   Final Result      No significant interval change.           Assessment/Plan  * Sepsis(995.91)  Assessment & Plan  GN bacteremia  Hx of ESBL Ecoli  merropenem  S/p pressor support  Wean IV fluids  Midodrine weaning as able.  Monitor vitals, I/O's, labs  ID consulting    Secondary adrenal insufficiency ([facility])- (present on admission)  Assessment & Plan  Wean hydrocortison from 100mg TID to 50mg BID  Cut midodrine from 10mg to 5mg TID  Monitor vitals.  Stop IV fluids.    Immunosuppressed status ([facility])- (present on admission)  Assessment & Plan  Due to Renal transplant and immunosuppressing meds.  Infectious disease consulting.    Septic shock ([facility])- (present on admission)  Assessment & Plan  S/p pressor support.  Monitor vitals  S/p IV fluids  antibiotics    Urinary tract infection- (present on admission)  Assessment & Plan  Last admission grew ESBL E. coli  Continue meropenem, renally adjust  ID consulted  Cultures pending    Wound of right lower extremity- (present on admission)  Assessment & Plan  Wound care  Avoid lower extremity edema/swelling     PAF (paroxysmal atrial fibrillation) ([facility])- (present on admission)  Assessment & Plan  Eliquis     Advanced care planning/counseling discussion  Assessment & Plan  Goals of cares discussed with patient and family member at bedside.  Full code.    Diabetes mellitus with coincident hypertension ([facility])- (present on admission)  Assessment & Plan  Sliding scale   Diabetic diet    GERD (gastroesophageal reflux disease)- (present on admission)  Assessment & Plan  omeprazole    Thrombocytopenia ([facility])- (present on admission)  Assessment & Plan  7/27 Plt:140  Sepsis?   Monitor cbc    SANKET (acute kidney injury) ([facility])- (present on admission)  Assessment & Plan  Fluids  Serial BMP showing improvement.  7/27 Cr:1.44, BUN:30  Stop IV fluids due to hx of EF:45%  Monitor I/O's,  labs.    Hyperlipidemia- (present on admission)  Assessment & Plan  Continue lipitor     History of renal transplant- (present on admission)  Assessment & Plan  Continue prograf and cellcept   Nephrology consulting.  Monitor labs.       VTE prophylaxis: therapeutic anticoagulation with apixaban    I have performed a physical exam and reviewed and updated ROS and Plan today (7/27/2022). In review of yesterday's note (7/26/2022), there are no changes except as documented above.

## 2022-07-27 NOTE — DISCHARGE PLANNING
Thank you for your request. There is no  order for F2F in [facility] for this patient. Once it has been uploaded please advise HH. Until there this request will be placed on hold

## 2022-07-27 NOTE — PROGRESS NOTES
Report received from [facility] shift RN, assumed patient care. Patient is calmly resting in bed, no signs of distress, even and unlabored breathing noted. Pt on room air. Tele box on and in place. Patient has call light within reach, fall precautions in place. Will continue to monitor.

## 2022-07-27 NOTE — DISCHARGE PLANNING
[facility] received for Home Health @1032. Order for Home Health receive and referral sent to [facility] @0821.

## 2022-07-27 NOTE — PROGRESS NOTES
Hospital Medicine Daily Progress Note    Date of Service  7/26/2022    Chief Complaint  Difficulties urinating, chills and generalized weakness over the last several days    Hospital Course  Jama Altman is a 66 y.o. male with a history of diabetes, polycystic kidney disease and had a subsequent renal transplant in 2010 and has been on immunosuppressant.  Of significance he was recently admitted in early July of this year for extended spectrum beta-lactamase E. coli bacteremia secondary to urinary source and had been on meropenem.  The patient was admitted 7/25/2022 with recurrent sepsis from UTI.  He was found to have recurrent gram-negative bacteremia.  Infectious disease has been consulted and have him initiated on meropenem again.    Interval Problem Update  7/26: Patient is alert and awake.  Questions why he has recurrent infection despite recent treatment with antibiotics.  Patient states he is making urine.  He does have chronic wounds on his right lower extremity for which she has been seeing wound care he states that these wounds are improving.  Patient is being transferred to IM ICU stepdown as he is just off pressor support this morning and initiated on midodrine.  I discussed with nursing prior to the patient's transfer we are decreasing his IV fluids from 150 cc an hour to 100.    I have discussed this patient's plan of care and discharge plan at IDT rounds today with Case Management, Nursing, Nursing leadership, and other members of the IDT team.    Consultants/Specialty  infectious disease    Code Status  Full Code    Disposition  Patient is not medically cleared for discharge.   Anticipate discharge to to home with close outpatient follow-up.  I have placed the appropriate orders for post-discharge needs.    Review of Systems  Review of Systems   Constitutional: Negative for chills, fever and malaise/fatigue.   Eyes: Negative for discharge.   Respiratory: Negative for cough, shortness of breath  and stridor.    Cardiovascular: Negative for chest pain, palpitations and leg swelling.   Gastrointestinal: Negative for abdominal pain, diarrhea, nausea and vomiting.   Genitourinary: Positive for frequency. Negative for dysuria and hematuria.   Musculoskeletal: Negative for back pain and joint pain.   Skin: Negative for rash.   Neurological: Negative for dizziness and speech change.   Psychiatric/Behavioral: The patient is not nervous/anxious.         Physical Exam  Temp:  [35.9 °C (96.7 °F)-39.7 °C (103.5 °F)] 36.1 °C (97 °F)  Pulse:  [] 59  Resp:  [12-36] 22  BP: ()/(43-99) 156/69  SpO2:  [84 %-100 %] 95 %    Physical Exam  Vitals reviewed.   Constitutional:       Appearance: Normal appearance. He is not diaphoretic.   HENT:      Head: Normocephalic and atraumatic.      Nose: Nose normal.      Mouth/Throat:      Mouth: Mucous membranes are moist.      Pharynx: No oropharyngeal exudate.   Eyes:      General: No scleral icterus.        Right eye: No discharge.         Left eye: No discharge.      Extraocular Movements: Extraocular movements intact.      Conjunctiva/sclera: Conjunctivae normal.   Cardiovascular:      Rate and Rhythm: Normal rate and regular rhythm.      Pulses:           Radial pulses are 2+ on the right side and 2+ on the left side.        Dorsalis pedis pulses are 2+ on the right side and 2+ on the left side.      Heart sounds: No murmur heard.  Pulmonary:      Effort: Pulmonary effort is normal. No respiratory distress.      Breath sounds: Normal breath sounds. No wheezing or rales.   Abdominal:      General: Bowel sounds are normal. There is no distension.      Palpations: Abdomen is soft.   Musculoskeletal:         General: No swelling or tenderness.      Cervical back: Neck supple. No muscular tenderness.      Right lower leg: No edema.      Left lower leg: No edema.   Lymphadenopathy:      Cervical: No cervical adenopathy.   Skin:     Coloration: Skin is not jaundiced or pale.       Comments: Right lower leg wound covered with clean gauze.  Left knee with healing abraision.   Neurological:      General: No focal deficit present.      Mental Status: He is alert and oriented to person, place, and time. Mental status is at baseline.      Cranial Nerves: No cranial nerve deficit.   Psychiatric:         Mood and Affect: Mood normal.         Behavior: Behavior normal.         Fluids    Intake/Output Summary (Last 24 hours) at 7/26/2022 1903  Last data filed at 7/26/2022 1600  Gross per 24 hour   Intake 65095.75 ml   Output 2125 ml   Net 9309.75 ml       Laboratory  Recent Labs     07/25/22 1943   WBC 7.7   RBC 4.11*   HEMOGLOBIN 10.8*   HEMATOCRIT 35.6*   MCV 86.6   MCH 26.3*   MCHC 30.3*   RDW 55.6*   PLATELETCT 123*   MPV 11.6     Recent Labs     07/26/22  0425 07/26/22  1020 07/26/22  1315   SODIUM 133* *   POTASSIUM 4.7 RR 5.0   CHLORIDE 103    CO2 20 RR 21   GLUCOSE 108* *   BUN 28* RR 29*   CREATININE 1.80* RR 1.68*   CALCIUM 7.9* 8.4* 8.2*     Recent Labs     07/25/22 1943   INR 1.38*               Imaging  CT-ABDOMEN-PELVIS WITH   Final Result         1.  Fluid-filled prominence of small bowel, consider ileus and/or enteritis. Radiographic follow-up to resolution recommended as clinically appropriate to exclude progression to obstructive changes.   2.  Periportal edema, nonspecific, can be associated with acute hepatitis in the appropriate clinical setting.   3.  Changes of the left kidney most compatible with polycystic kidney disease.   4.  3.0 cm fusiform infrarenal abdominal aortic aneurysm, radiographic follow-up and surveillance recommended as clinically appropriate.   5.  Fat-containing left inguinal hernia   6.  Atherosclerosis and atherosclerotic coronary artery disease      DX-CHEST-PORTABLE (1 VIEW)   Final Result      No significant interval change.           Assessment/Plan  * Sepsis(995.91)  Assessment & Plan  GN bacteremia  Hx of ESBL  Ecoli  merropenem  S/p pressor support  Wean IV fluids  Midodrine  Monitor vitals, I/O's, labs  ID consulting          Immunosuppressed status ([facility])- (present on admission)  Assessment & Plan  Due to Renal transplant and immunosuppressing meds.  Infectious disease consulting.    Urinary tract infection- (present on admission)  Assessment & Plan  Last admission grew ESBL E. coli  Continue meropenem, renally adjust  ID consulted  Cultures pending    Wound of right lower extremity- (present on admission)  Assessment & Plan  Wound care  Avoid lower extremity edema/swelling     PAF (paroxysmal atrial fibrillation) ([facility])- (present on admission)  Assessment & Plan  Eliquis     Advanced care planning/counseling discussion  Assessment & Plan  Goals of cares discussed with patient and family member at bedside.  Full code.    Diabetes mellitus with coincident hypertension ([facility])- (present on admission)  Assessment & Plan  Sliding scale   Diabetic diet    GERD (gastroesophageal reflux disease)- (present on admission)  Assessment & Plan  omeprazole    SANKET (acute kidney injury) ([facility])- (present on admission)  Assessment & Plan  Fluids  Serial BMP showing improvement.  Monitor I/O's, labs.    Hyperlipidemia- (present on admission)  Assessment & Plan  Continue lipitor     History of renal transplant- (present on admission)  Assessment & Plan  Continue prograd and cellcept   Nephrology consulting.  Monitor labs.       VTE prophylaxis: therapeutic anticoagulation with apixaban    I have performed a physical exam and reviewed and updated ROS and Plan today (7/26/2022). In review of yesterday's note (7/25/2022), there are no changes except as documented above.

## 2022-07-27 NOTE — DISCHARGE PLANNING
"[facility] TCN chart review completed. Noted patient is a readmission to [facility] with previous admission 6/20-7/6/22 in the setting of Septic Shock. Patient was admitted from [facility]. Per patient, he was an anticipated discharge from [facility] today; however, returned to [facility] with new diagnosis of Sepsis. The most current review of medical record, knowledge of pt's PLOF and social support, LACE+ score of 76, 6 clicks scores of 23 ADL and 22 mobility were considered.      TCN met with patient at bedside. Patient familiar with this TCN, recalling TCN visits from previous 2 acute hospitalizations. As such, briefly introduced self and TCN program. Discussed levels of care specific to patient potential discharge needs including SNF, HH. At this time, patient amendable to discussion regarding HH, patient declined any discussion regarding return to post-acute placement, SNF setting \"I am going to go home after this, I was supposed to discharge home today.\" Patient endorsed great improvement in physical and functional mobility and strength following SNF stay, endorsing he is now ambulating independent of an assistive device. Patient endorsed his daughter will be staying with him once he returns home and also has a son who is involved in his care.     Appreciate provider consults present for PT and will appreciate recommendations in patient discharge planning. Choice proactively obtained for HH as patient amendable to discussions regarding HH only at this time and faxed to [facility]. Choice also obtained for IV Infusion (given noted current need for IV ABX); however, patient stated his medical team is currently monitoring for possibility of switching to oral ABX to which patient stated \"then I would be balderrama to return home.\"     TCN will continue to follow and collaborate with discharge planning team as additional post acute needs arise. Thank you.   "

## 2022-07-28 ENCOUNTER — APPOINTMENT (OUTPATIENT)
Dept: RADIOLOGY | Facility: MEDICAL CENTER | Age: 67
DRG: 698 | End: 2022-07-28
Attending: INTERNAL MEDICINE
Payer: MEDICARE

## 2022-07-28 ENCOUNTER — APPOINTMENT (OUTPATIENT)
Dept: RADIOLOGY | Facility: MEDICAL CENTER | Age: 67
DRG: 698 | End: 2022-07-28
Attending: HOSPITALIST
Payer: MEDICARE

## 2022-07-28 LAB
ALBUMIN SERPL BCP-MCNC: 2.6 G/DL (ref 3.2–4.9)
ALBUMIN/GLOB SERPL: 1.2 G/DL
ALP SERPL-CCNC: 71 U/L (ref 30–99)
ALT SERPL-CCNC: 10 U/L (ref 2–50)
ANION GAP SERPL CALC-SCNC: 8 MMOL/L (ref 7–16)
AST SERPL-CCNC: 9 U/L (ref 12–45)
BACTERIA BLD CULT: ABNORMAL
BACTERIA UR CULT: ABNORMAL
BACTERIA UR CULT: ABNORMAL
BASOPHILS # BLD AUTO: 0.3 % (ref 0–1.8)
BASOPHILS # BLD: 0.03 K/UL (ref 0–0.12)
BILIRUB SERPL-MCNC: 0.2 MG/DL (ref 0.1–1.5)
BUN SERPL-MCNC: 25 MG/DL (ref 8–22)
CALCIUM SERPL-MCNC: 8 MG/DL (ref 8.5–10.5)
CHLORIDE SERPL-SCNC: 106 MMOL/L (ref 96–112)
CO2 SERPL-SCNC: 22 MMOL/L (ref 20–33)
CREAT SERPL-MCNC: 1.7 MG/DL (ref 0.5–1.4)
EOSINOPHIL # BLD AUTO: 0.01 K/UL (ref 0–0.51)
EOSINOPHIL NFR BLD: 0.1 % (ref 0–6.9)
ERYTHROCYTE [DISTWIDTH] IN BLOOD BY AUTOMATED COUNT: 54 FL (ref 35.9–50)
GFR SERPLBLD CREATININE-BSD FMLA CKD-EPI: 44 ML/MIN/1.73 M 2
GLOBULIN SER CALC-MCNC: 2.1 G/DL (ref 1.9–3.5)
GLUCOSE BLD STRIP.AUTO-MCNC: 141 MG/DL (ref 65–99)
GLUCOSE BLD STRIP.AUTO-MCNC: 147 MG/DL (ref 65–99)
GLUCOSE BLD STRIP.AUTO-MCNC: 159 MG/DL (ref 65–99)
GLUCOSE BLD STRIP.AUTO-MCNC: 194 MG/DL (ref 65–99)
GLUCOSE BLD STRIP.AUTO-MCNC: 215 MG/DL (ref 65–99)
GLUCOSE SERPL-MCNC: 166 MG/DL (ref 65–99)
HCT VFR BLD AUTO: 27.2 % (ref 42–52)
HGB BLD-MCNC: 8.4 G/DL (ref 14–18)
IMM GRANULOCYTES # BLD AUTO: 0.08 K/UL (ref 0–0.11)
IMM GRANULOCYTES NFR BLD AUTO: 0.7 % (ref 0–0.9)
LYMPHOCYTES # BLD AUTO: 0.83 K/UL (ref 1–4.8)
LYMPHOCYTES NFR BLD: 7.3 % (ref 22–41)
MAGNESIUM SERPL-MCNC: 1.8 MG/DL (ref 1.5–2.5)
MCH RBC QN AUTO: 26.3 PG (ref 27–33)
MCHC RBC AUTO-ENTMCNC: 30.9 G/DL (ref 33.7–35.3)
MCV RBC AUTO: 85.3 FL (ref 81.4–97.8)
MONOCYTES # BLD AUTO: 0.96 K/UL (ref 0–0.85)
MONOCYTES NFR BLD AUTO: 8.4 % (ref 0–13.4)
NEUTROPHILS # BLD AUTO: 9.47 K/UL (ref 1.82–7.42)
NEUTROPHILS NFR BLD: 83.2 % (ref 44–72)
NRBC # BLD AUTO: 0 K/UL
NRBC BLD-RTO: 0 /100 WBC
PHOSPHATE SERPL-MCNC: 2.7 MG/DL (ref 2.5–4.5)
PLATELET # BLD AUTO: 153 K/UL (ref 164–446)
PMV BLD AUTO: 12.2 FL (ref 9–12.9)
POTASSIUM SERPL-SCNC: 3.9 MMOL/L (ref 3.6–5.5)
PROT SERPL-MCNC: 4.7 G/DL (ref 6–8.2)
RBC # BLD AUTO: 3.19 M/UL (ref 4.7–6.1)
SIGNIFICANT IND 70042: ABNORMAL
SITE SITE: ABNORMAL
SODIUM SERPL-SCNC: 136 MMOL/L (ref 135–145)
SOURCE SOURCE: ABNORMAL
WBC # BLD AUTO: 11.4 K/UL (ref 4.8–10.8)

## 2022-07-28 PROCEDURE — 700111 HCHG RX REV CODE 636 W/ 250 OVERRIDE (IP): Performed by: INTERNAL MEDICINE

## 2022-07-28 PROCEDURE — 700102 HCHG RX REV CODE 250 W/ 637 OVERRIDE(OP): Performed by: STUDENT IN AN ORGANIZED HEALTH CARE EDUCATION/TRAINING PROGRAM

## 2022-07-28 PROCEDURE — 05HY33Z INSERTION OF INFUSION DEVICE INTO UPPER VEIN, PERCUTANEOUS APPROACH: ICD-10-PCS | Performed by: HOSPITALIST

## 2022-07-28 PROCEDURE — 700111 HCHG RX REV CODE 636 W/ 250 OVERRIDE (IP): Performed by: STUDENT IN AN ORGANIZED HEALTH CARE EDUCATION/TRAINING PROGRAM

## 2022-07-28 PROCEDURE — A9270 NON-COVERED ITEM OR SERVICE: HCPCS | Performed by: HOSPITALIST

## 2022-07-28 PROCEDURE — 770001 HCHG ROOM/CARE - MED/SURG/GYN PRIV*

## 2022-07-28 PROCEDURE — 99233 SBSQ HOSP IP/OBS HIGH 50: CPT | Performed by: INTERNAL MEDICINE

## 2022-07-28 PROCEDURE — 82962 GLUCOSE BLOOD TEST: CPT

## 2022-07-28 PROCEDURE — A9270 NON-COVERED ITEM OR SERVICE: HCPCS | Performed by: STUDENT IN AN ORGANIZED HEALTH CARE EDUCATION/TRAINING PROGRAM

## 2022-07-28 PROCEDURE — 700105 HCHG RX REV CODE 258: Performed by: INTERNAL MEDICINE

## 2022-07-28 PROCEDURE — 700102 HCHG RX REV CODE 250 W/ 637 OVERRIDE(OP): Performed by: INTERNAL MEDICINE

## 2022-07-28 PROCEDURE — 700102 HCHG RX REV CODE 250 W/ 637 OVERRIDE(OP): Performed by: HOSPITALIST

## 2022-07-28 PROCEDURE — A9270 NON-COVERED ITEM OR SERVICE: HCPCS | Performed by: INTERNAL MEDICINE

## 2022-07-28 PROCEDURE — 99233 SBSQ HOSP IP/OBS HIGH 50: CPT | Performed by: HOSPITALIST

## 2022-07-28 PROCEDURE — 76937 US GUIDE VASCULAR ACCESS: CPT

## 2022-07-28 PROCEDURE — 700111 HCHG RX REV CODE 636 W/ 250 OVERRIDE (IP): Performed by: HOSPITALIST

## 2022-07-28 PROCEDURE — 83735 ASSAY OF MAGNESIUM: CPT

## 2022-07-28 PROCEDURE — 85025 COMPLETE CBC W/AUTO DIFF WBC: CPT

## 2022-07-28 PROCEDURE — 84100 ASSAY OF PHOSPHORUS: CPT

## 2022-07-28 PROCEDURE — 80053 COMPREHEN METABOLIC PANEL: CPT

## 2022-07-28 RX ORDER — PREDNISONE 5 MG/1
5 TABLET ORAL DAILY
Status: DISCONTINUED | OUTPATIENT
Start: 2022-07-29 | End: 2022-08-01 | Stop reason: HOSPADM

## 2022-07-28 RX ORDER — MAGNESIUM SULFATE HEPTAHYDRATE 40 MG/ML
2 INJECTION, SOLUTION INTRAVENOUS ONCE
Status: COMPLETED | OUTPATIENT
Start: 2022-07-28 | End: 2022-07-28

## 2022-07-28 RX ADMIN — MIDODRINE HYDROCHLORIDE 5 MG: 5 TABLET ORAL at 05:50

## 2022-07-28 RX ADMIN — MEROPENEM 500 MG: 500 INJECTION, POWDER, FOR SOLUTION INTRAVENOUS at 05:50

## 2022-07-28 RX ADMIN — ATORVASTATIN CALCIUM 80 MG: 80 TABLET, FILM COATED ORAL at 20:46

## 2022-07-28 RX ADMIN — MIDODRINE HYDROCHLORIDE 5 MG: 5 TABLET ORAL at 13:18

## 2022-07-28 RX ADMIN — APIXABAN 5 MG: 5 TABLET, FILM COATED ORAL at 05:50

## 2022-07-28 RX ADMIN — MYCOPHENOLATE MOFETIL 500 MG: 250 CAPSULE ORAL at 05:51

## 2022-07-28 RX ADMIN — HYDROCORTISONE SODIUM SUCCINATE 50 MG: 100 INJECTION, POWDER, FOR SOLUTION INTRAMUSCULAR; INTRAVENOUS at 05:49

## 2022-07-28 RX ADMIN — TACROLIMUS 1 MG: 1 CAPSULE ORAL at 17:14

## 2022-07-28 RX ADMIN — INSULIN HUMAN 3 UNITS: 100 INJECTION, SOLUTION PARENTERAL at 17:54

## 2022-07-28 RX ADMIN — INSULIN HUMAN 4 UNITS: 100 INJECTION, SOLUTION PARENTERAL at 13:15

## 2022-07-28 RX ADMIN — GABAPENTIN 600 MG: 300 CAPSULE ORAL at 20:46

## 2022-07-28 RX ADMIN — APIXABAN 5 MG: 5 TABLET, FILM COATED ORAL at 17:13

## 2022-07-28 RX ADMIN — MIDODRINE HYDROCHLORIDE 5 MG: 5 TABLET ORAL at 20:46

## 2022-07-28 RX ADMIN — TACROLIMUS 1 MG: 1 CAPSULE ORAL at 05:51

## 2022-07-28 RX ADMIN — INSULIN HUMAN 3 UNITS: 100 INJECTION, SOLUTION PARENTERAL at 23:21

## 2022-07-28 RX ADMIN — MEROPENEM 500 MG: 500 INJECTION, POWDER, FOR SOLUTION INTRAVENOUS at 23:23

## 2022-07-28 RX ADMIN — MAGNESIUM SULFATE HEPTAHYDRATE 2 G: 40 INJECTION, SOLUTION INTRAVENOUS at 13:19

## 2022-07-28 RX ADMIN — MEROPENEM 500 MG: 500 INJECTION, POWDER, FOR SOLUTION INTRAVENOUS at 17:54

## 2022-07-28 RX ADMIN — MEROPENEM 500 MG: 500 INJECTION, POWDER, FOR SOLUTION INTRAVENOUS at 12:31

## 2022-07-28 RX ADMIN — MYCOPHENOLATE MOFETIL 500 MG: 250 CAPSULE ORAL at 17:14

## 2022-07-28 ASSESSMENT — PAIN DESCRIPTION - PAIN TYPE
TYPE: ACUTE PAIN

## 2022-07-28 ASSESSMENT — ENCOUNTER SYMPTOMS
COUGH: 0
BLURRED VISION: 0
CHILLS: 0
NAUSEA: 0
PALPITATIONS: 0
SHORTNESS OF BREATH: 0
VOMITING: 0
NERVOUS/ANXIOUS: 0
BACK PAIN: 0
ABDOMINAL PAIN: 0
STRIDOR: 0
HEADACHES: 0
SPEECH CHANGE: 0
FEVER: 0

## 2022-07-28 ASSESSMENT — FIBROSIS 4 INDEX: FIB4 SCORE: 1.21

## 2022-07-28 NOTE — DISCHARGE PLANNING
TCN following. HTH/SCP chart review completed.   PT noted on 7/27/2022 mbr will have no further PT needs.  I voalte messaged Dr Cat to inquire if this mbr will need HH .  Per Dr Fields response, Mbr will only need HH services if he needs IV antibiotic infusion at home.  Mrb is not medically cleared for DC as of 7/27 /2022     Previously completed:  - Choice forms: (HH, IV Infusion).   - [facility] introduced (Y), referral (sent).

## 2022-07-28 NOTE — PROGRESS NOTES
Order received for midline placement for 2 weeks IV antibiotic therapy. Dr. Ramirez from nephrology consulting. Labs within range per our protocol. VT italo Adkins to place midline at this time.

## 2022-07-28 NOTE — WOUND TEAM
"[facility]  Inpatient Services  Initial Wound and Skin Care Evaluation    Admission Date: 7/25/2022     Last order of IP CONSULT TO WOUND CARE was found on 7/26/2022 from Hospital Encounter on 7/25/2022     HPI, PMH, SH: Reviewed    Past Surgical History:   Procedure Laterality Date   • KNEE MANIPULATION  2/16/2012    Performed by LATOYA CONNER at [facility]ERY [facility] ORS   • KNEE UNICOMPARTMENTAL  12/23/2011    Performed by LATOYA CONNER at SURGERY [facility] ORS   • KNEE ARTHROSCOPY  12/23/2011    Performed by LATOYA CONNER at [facility]   • KNEE ARTHROSCOPY  5/3/2011    Performed by HANANE GOLDMAN at SURGERY SAME DAY [facility] ORS   • MENISCECTOMY, KNEE, MEDIAL  5/3/2011    Performed by HANANE GOLDMNA at SURGERY SAME DAY [facility] ORS   • OTHER  9/10/10    RIGHT KIDNEY TRANSPLANT   • KNEE ARTHROPLASTY TOTAL  1/12/07    RIGHT   • KNEE ARTHROSCOPY  4/10/06    RIGHT   • OTHER ORTHOPEDIC SURGERY  7/8/74    LEFT KNEE DEBRIDEMENT     Social History     Tobacco Use   • Smoking status: Never Smoker   • Smokeless tobacco: Never Used   Substance Use Topics   • Alcohol use: No     Chief Complaint   Patient presents with   • UTI     Patient concerned for a UTI, not urinating often and when he does it's very small amounts, last urination 1400 today     Diagnosis: Sepsis (HCC) [A41.9]  Septic shock (HCC) [A41.9, R65.21]    Unit where seen by Wound Team: [id number]     WOUND CONSULT/FOLLOW UP RELATED TO:  L knee, R dorsal foot, RLE    WOUND HISTORY:  Per H&P \"Jama Altman is a 66 y.o. male who presented 7/25/2022 with generalized weakness for the last several days.  Endorses associated chills and shaking of extremities.     Has history of polycystic kidney disease s/p renal transplant 2010.  Recent admission for septic shock secondary to E. coli bacteremia.  Had PEA arrest and intubation.  Known to have nonhealing ulcerations to which angiogram revealed an occlusion of the anterior tibial " "artery.  During hospitalization went into renal failure requiring transient dialysis to which kidney functions resolved.  Was discharged to nursing home shortly after.  Return to ED for septic shock and SANKET secondary to ESBL E. coli to which he was started meropenem.  Patient sepsis and SANKET resolved.\"    WOUND ASSESSMENT/LDA        Wound 07/26/22 Full Thickness Wound Pretibial Anterior;Posterior Right (Active)   Wound Image    07/27/22 1515   Site Assessment Red 07/27/22 1515   Periwound Assessment Dry;Intact;Scar tissue;Edema 07/27/22 1515   Margins Defined edges 07/27/22 1515   Closure Secondary intention 07/27/22 1515   Drainage Amount Scant 07/27/22 1515   Drainage Description Serosanguineous 07/27/22 1515   Treatments Cleansed;Site care;Compression 07/27/22 1515   Wound Cleansing Approved Wound Cleanser 07/27/22 1515   Periwound Protectant Skin Protectant Wipes to Periwound 07/27/22 1515   Dressing Cleansing/Solutions Not Applicable 07/27/22 1515   Dressing Options Collagen Dressing;Hydrofera Blue Ready;Mepilex;Tubigrip 07/27/22 1515   Dressing Changed Changed 07/27/22 1515   Dressing Status Clean;Dry;Intact 07/27/22 1515   Dressing Change/Treatment Frequency Every 72 hrs, and As Needed 07/27/22 1515   NEXT Dressing Change/Treatment Date 07/30/22 07/27/22 1515   NEXT Weekly Photo (Inpatient Only) 08/03/22 07/27/22 1515   Non-staged Wound Description Full thickness 07/27/22 1515   Wound Length (cm) 19 cm 07/27/22 1515   Wound Width (cm) 4 cm 07/27/22 1515   Wound Depth (cm) 0.2 cm 07/27/22 1515   Wound Surface Area (cm^2) 76 cm^2 07/27/22 1515   Wound Volume (cm^3) 15.2 cm^3 07/27/22 1515   Shape Irregular 07/27/22 1515   Wound Odor None 07/27/22 1515   Pulses 1+;Right;DP;PT 07/27/22 1515   Exposed Structures None 07/27/22 1515   WOUND NURSE ONLY - Time Spent with Patient (mins) 45 07/27/22 1515       Wound 07/26/22 Partial Thickness Wound Right Top of foot- Redness, healing wound, boggy (Active)   Wound Image   " 07/27/22 1515   Site Assessment Pink;Red;Epithelialization 07/27/22 1515   Periwound Assessment Scar tissue 07/27/22 1515   Margins Attached edges 07/27/22 1515   Closure Open to air 07/27/22 1515   Drainage Amount None 07/27/22 1515   Drainage Description JUDITH 07/27/22 0800   Dressing Options Open to Air 07/27/22 1515   Dressing Status Removed;Open to Air 07/27/22 1515       Wound 07/26/22 Knee Left Redness, healing wound (Active)   Wound Image   07/27/22 1515   Site Assessment Pink;Red;Epithelialization 07/27/22 1515   Periwound Assessment Red;Scar tissue 07/27/22 1515   Margins Attached edges 07/27/22 1515   Closure Open to air 07/27/22 1515   Drainage Amount None 07/27/22 1515   Dressing Options Open to Air 07/27/22 1515   Dressing Status Removed;Open to Air 07/27/22 1515        Vascular:    MIRELLA:   No results found.    Lab Values:    Lab Results   Component Value Date/Time    WBC 12.1 (H) 07/27/2022 04:15 AM    RBC 3.37 (L) 07/27/2022 04:15 AM    HEMOGLOBIN 8.9 (L) 07/27/2022 04:15 AM    HEMATOCRIT 29.1 (L) 07/27/2022 04:15 AM    CREACTPROT 4.82 (H) 06/03/2022 09:42 AM    SEDRATEWES <1 08/19/2020 07:16 AM    HBA1C 6.2 (H) 07/26/2022 12:31 AM        Culture Results show:  No results found for this or any previous visit (from the past 720 hour(s)).    Pain Level/Medicated:  Denies pain       INTERVENTIONS BY WOUND TEAM:  Chart and images reviewed. Discussed with bedside RN. All areas of concern (based on picture review, LDA review and discussion with bedside RN) have been thoroughly assessed. Documentation of areas based on significant findings. This RN in to assess patient. Performed standard wound care which includes appropriate positioning, dressing removal and non-selective debridement. Pictures and measurements obtained weekly if/when required.  Preparation for Dressing removal: Dressing removed without difficulty  Non-selectively Debrided with:  Cleanser and gauze.  Sharp debridement: NA  Nadeen wound: Cleansed  with cleanser, Prepped with no sting  Primary Dressing: Collagen dressing (activated kristina), hydroferablue  Secondary (Outer) Dressing: mepilex, Tubigrip F    Interdisciplinary consultation: Patient, Bedside RN (Dior)    EVALUATION / RATIONALE FOR TREATMENT:  Most Recent Date:  7/27/22: Wounds significantly improved since previous admission. Wound beds appear red and clean. Dried scabbing peeled away from wound edges revealing red healthy tissue underneath. Kristina dressing used to absorb destructive components of wound exudate and create an optimal environment for cellular growth. Hydrofera Blue applied for the hydrophilic polyurethane foam which contains ethylene oxide used as a bactericidal, fungicidal, and sporicidal disinfectant. Hydrofera Blue also aids in maintaining a moist wound environment. The absorption properties of this dressing are important in collecting exudates and bacteria from the injured area. These harmful fluid secretions bind to the dressing removing it from the wound without the foam sticking to the wound causing more harm. Tubi  applied to assist with edema. Left knee and R foot nearly resolved and mostly scar tissue, thus left GABBY.       Goals: Steady decrease in wound area and depth weekly.    WOUND TEAM PLAN OF CARE ([X] for frequency of wound follow up,):   Nursing to follow dressing orders written for wound care. Contact wound team if area fails to progress, deteriorates or with any questions/concerns if something comes up before next scheduled follow up (See below as to whether wound is following and frequency of wound follow up)  Dressing changes by wound team:                   Follow up 3 times weekly:                NPWT change 3 times weekly:     Follow up 1-2 times weekly:    X weekly  Follow up Bi-Monthly:                   Follow up as needed:     Other (explain):     NURSING PLAN OF CARE ORDERS (X):  Dressing changes: See Dressing Care orders: X  Skin care: See Skin  Care orders:   RN Prevention Protocol:   Rectal tube care: See Rectal Tube Care orders:   Other orders:    RSKIN:   CURRENTLY IN PLACE (X), APPLIED THIS VISIT (A), ORDERED (O):   Q shift Javad:  X  Q shift pressure point assessments:  X    Surface/Positioning   Pressure redistribution mattress            Low Airloss    X      Bariatric foam      Bariatric ORTEGA     Waffle cushion        Waffle Overlay          Reposition q 2 hours      TAPs Turning system     Z Kaleb Pillow     Offloading/Redistribution NA  Sacral Mepilex (Silicone dressing)     Heel Mepilex (Silicone dressing)         Heel float boots (Prevalon boot)             Float Heels off Bed with Pillows           Respiratory RA  Silicone O2 tubing         Gray Foam Ear protectors     Cannula fixation Device (Tender )          High flow offloading Clip    Elastic head band offloading device      Anchorfast                                                         Trach with Optifoam split foam             Containment/Moisture Prevention continent    Rectal tube or BMS    Purwick/Condom Cath        Epps Catheter    Barrier wipes           Barrier paste       Antifungal tx      Interdry        Mobilization       Up to chair        Ambulate    X  PT/OT      Nutrition       Dietician        Diabetes Education      PO  X   TF     TPN     NPO   # days     Other        Anticipated discharge plans: TBD pt will require ongoing wound care on discharge  LTACH:        SNF/Rehab:                  Home Health Care:           Outpatient Wound Center:            Self/Family Care:        Other:                  Vac Discharge Needs:   Not Applicable Pt not on a wound vac:     X  Regular Vac while inpatient, alternative dressing at DC:        Regular Vac in use and continued at DC:            Reg. Vac w/ Skin Sub/Biologic in use. Will need to be changed 2x wkly:      Veraflo Vac while inpatient, ok to transition to Regular Vac on Discharge:           Veraflo Vac while  inpatient, will need to remain on Veraflo Vac upon discharge:

## 2022-07-28 NOTE — CONSULTS
Vascular Access Team    Date of Insertion: 7/28/22  Arm Circumference: 33  Internal length: 14  External Length: 0  Vein Occupancy %: 37  Reason for Midline: antibiotic therapy   Labs: WBC 11.4, , BUN 25, Cr 1.70, GFR 44, INR 1.38 on 7/25/22    Orders confirmed, vessel patency confirmed with ultrasound. Risks and benefits of procedure explained to patient and education regarding line associated bloodstream infections provided. Questions answered.     Power Midline placed in LUE per licensed provider order with ultrasound guidance. 4 Fr, single lumen Power Midline placed in cephalic vein after 1  attempt(s). 2 mL of 1% lidocaine injected intradermally, 21 gauge microintroducer needle was visualized entering the vein and modified Seldinger technique used. 14 cm catheter inserted with good blood return. Secured at 0 cm marker. Internal positioning stylet removed and verified to be intact. Each lumen flushed without resistance with 10 mL 0.9% normal saline. Midline secured with Biopatch and Tegaderm.     Midline placement is confirmed by nurse using ultrasound and ability to flush and draw blood. Midline is appropriate for use at this time.  Patient tolerated procedure well, without complications.  No X-ray is needed for placement confirmation.  Patient condition relayed to unit RN or ordering physician via this post procedure note in the EMR.     Ultrasound images uploaded to PACS and viewable in the EMR - yes  Ultrasound imaged printed and placed in paper chart - no     BARD Power Midline ref # [id number], Lot # [id number], Expiration Date 01/31/2023

## 2022-07-28 NOTE — PROGRESS NOTES
12hr chart check    Monitor Summary    Rhythm: NSR/SB/BBB  Rate: 54-67  Measurements: .21/.16/.51

## 2022-07-28 NOTE — PROGRESS NOTES
Hospital Medicine Daily Progress Note    Date of Service  7/28/2022    Chief Complaint  Difficulties urinating, chills and generalized weakness over the last several days    Hospital Course  Jama Altman is a 66 y.o. male with a history of diabetes, polycystic kidney disease and had a subsequent renal transplant in 2010 and has been on immunosuppressant.  Of significance he was recently admitted in early July of this year for extended spectrum beta-lactamase E. coli bacteremia secondary to urinary source and had been on meropenem.  The patient was admitted 7/25/2022 with recurrent sepsis from UTI.  He was found to have recurrent gram-negative bacteremia.  Infectious disease has been consulted and have him initiated on meropenem again.    Interval Problem Update  7/28: Stopped IV steroids and placed back on home prednisone 5mg. If blood pressure remains stable will look to discontinue midodrine 7/29.  PICC/midline line ordered for home antibiotics until 8/8/22 per ID then oral ciprofloxacin x 2 weeks. Supplementing 2 gram IV MgSO4. Patient getting up ambulating.  Slept better.      7/27: Hgb:8.9, WBC:12.1, Cr:1.44, BUN:30, Alb:2.6, A1c:6.2. 7/26 Blood cultures remain without growth.  7/25 BC GNR but no ID/Sen yet. Wean hydrocortisone and midodrine as BP increasing.       7/26: Patient is alert and awake.  Questions why he has recurrent infection despite recent treatment with antibiotics.  Patient states he is making urine.  He does have chronic wounds on his right lower extremity for which she has been seeing wound care he states that these wounds are improving.  Patient is being transferred to  ICU stepdown as he is just off pressor support this morning and initiated on midodrine.  I discussed with nursing prior to the patient's transfer we are decreasing his IV fluids from 150 cc an hour to 100.     I have discussed this patient's plan of care and discharge plan at IDT rounds today with Case Management,  Nursing, Nursing leadership, and other members of the IDT team.    Consultants/Specialty  infectious disease    Code Status  Full Code    Disposition  Patient is not medically cleared for discharge.   Anticipate discharge to to home with close outpatient follow-up.  I have placed the appropriate orders for post-discharge needs.    Review of Systems  Review of Systems   Constitutional: Negative for chills and fever.   Eyes: Negative for blurred vision.   Respiratory: Negative for cough, shortness of breath and stridor.    Cardiovascular: Negative for chest pain, palpitations and leg swelling.   Gastrointestinal: Negative for abdominal pain and nausea.   Genitourinary: Negative for dysuria and frequency.   Musculoskeletal: Negative for back pain and joint pain.   Neurological: Negative for speech change and headaches.   Psychiatric/Behavioral: The patient is not nervous/anxious.         Physical Exam  Temp:  [35.7 °C (96.3 °F)-36.4 °C (97.5 °F)] 35.8 °C (96.5 °F)  Pulse:  [] 100  Resp:  [17-18] 17  BP: (119-137)/(68-78) 137/78  SpO2:  [94 %-96 %] 94 %    Physical Exam  Vitals reviewed.   Constitutional:       Appearance: Normal appearance. He is not diaphoretic.   HENT:      Head: Normocephalic and atraumatic.      Nose: Nose normal.      Mouth/Throat:      Mouth: Mucous membranes are moist.      Pharynx: No oropharyngeal exudate.   Eyes:      General: No scleral icterus.        Right eye: No discharge.         Left eye: No discharge.      Extraocular Movements: Extraocular movements intact.      Conjunctiva/sclera: Conjunctivae normal.   Cardiovascular:      Rate and Rhythm: Normal rate and regular rhythm.      Pulses:           Radial pulses are 2+ on the right side and 2+ on the left side.        Dorsalis pedis pulses are 2+ on the right side and 2+ on the left side.      Heart sounds: No murmur heard.  Pulmonary:      Effort: Pulmonary effort is normal. No respiratory distress.      Breath sounds: Normal  breath sounds. No wheezing or rales.   Abdominal:      General: Bowel sounds are normal. There is no distension.      Palpations: Abdomen is soft.   Musculoskeletal:         General: No swelling or tenderness.      Cervical back: Neck supple. No muscular tenderness.      Right lower leg: No edema.      Left lower leg: No edema.   Lymphadenopathy:      Cervical: No cervical adenopathy.   Skin:     Coloration: Skin is not jaundiced or pale.      Comments: Right lower leg wound covered with clean gauze.  Left knee with healing abraision.   Neurological:      General: No focal deficit present.      Mental Status: He is alert and oriented to person, place, and time. Mental status is at baseline.      Cranial Nerves: No cranial nerve deficit.   Psychiatric:         Mood and Affect: Mood normal.         Behavior: Behavior normal.         Fluids    Intake/Output Summary (Last 24 hours) at 7/28/2022 1311  Last data filed at 7/28/2022 0550  Gross per 24 hour   Intake 300 ml   Output 1550 ml   Net -1250 ml       Laboratory  Recent Labs     07/25/22 1943 07/27/22 0415 07/28/22 0312   WBC 7.7 12.1* 11.4*   RBC 4.11* 3.37* 3.19*   HEMOGLOBIN 10.8* 8.9* 8.4*   HEMATOCRIT 35.6* 29.1* 27.2*   MCV 86.6 86.4 85.3   MCH 26.3* 26.4* 26.3*   MCHC 30.3* 30.6* 30.9*   RDW 55.6* 54.6* 54.0*   PLATELETCT 123* 140* 153*   MPV 11.6 11.5 12.2     Recent Labs     07/26/22  1315 07/27/22 0415 07/28/22 0312   SODIUM 133* 133* 136   POTASSIUM 5.0 4.5 3.9   CHLORIDE 101 101 106   CO2 21 21 22   GLUCOSE 310* 232* 166*   BUN 29* 30* 25*   CREATININE 1.68* 1.44* 1.70*   CALCIUM 8.2* 8.1* 8.0*     Recent Labs     07/25/22 1943   INR 1.38*               Imaging  CT-ABDOMEN-PELVIS WITH   Final Result         1.  Fluid-filled prominence of small bowel, consider ileus and/or enteritis. Radiographic follow-up to resolution recommended as clinically appropriate to exclude progression to obstructive changes.   2.  Periportal edema, nonspecific, can be  associated with acute hepatitis in the appropriate clinical setting.   3.  Changes of the left kidney most compatible with polycystic kidney disease.   4.  3.0 cm fusiform infrarenal abdominal aortic aneurysm, radiographic follow-up and surveillance recommended as clinically appropriate.   5.  Fat-containing left inguinal hernia   6.  Atherosclerosis and atherosclerotic coronary artery disease      DX-CHEST-PORTABLE (1 VIEW)   Final Result      No significant interval change.      IR-MIDLINE CATHETER INSERTION WO GUIDANCE > AGE 3    (Results Pending)        Assessment/Plan  * Sepsis(995.91)  Assessment & Plan  GN bacteremia  Hx of ESBL Ecoli  merropenem until 8/8/22 via IV and then switch to oral ciprofloxacin per ID for two weeks  7/28 ordered midline catheter  S/p pressor support  Wean IV fluids  Midodrine weaning as able.  Monitor vitals, I/O's, labs  ID consulting    Secondary adrenal insufficiency ([facility])- (present on admission)  Assessment & Plan  7/28 stop hydrocortisone  Cut midodrine from 10mg to 5mg TID  Monitor vitals.  Stop IV fluids.    Immunosuppressed status ([facility])- (present on admission)  Assessment & Plan  Due to Renal transplant and immunosuppressing meds.  Infectious disease consulting.    Septic shock ([facility])- (present on admission)  Assessment & Plan  S/p pressor support.  Monitor vitals  S/p IV fluids  antibiotics    Urinary tract infection- (present on admission)  Assessment & Plan  ESBL E. Coli on blood and urine cultures  Continue meropenem, renally adjust  If recurrent after this treatment he will need urology consultation  ID consulted  Cultures pending    Wound of right lower extremity- (present on admission)  Assessment & Plan  Wound care  Avoid lower extremity edema/swelling     PAF (paroxysmal atrial fibrillation) ([facility])- (present on admission)  Assessment & Plan  Eliquis     Advanced care planning/counseling discussion  Assessment & Plan  Goals of cares discussed with patient and family  member at bedside.  Full code.    Diabetes mellitus with coincident hypertension ([facility]CC)- (present on admission)  Assessment & Plan  Monitor accuchecks and cover with sliding scale insulin  Diabetic diet  Holding outpatient Januvia and glyburide    GERD (gastroesophageal reflux disease)- (present on admission)  Assessment & Plan  omeprazole    Thrombocytopenia ([facility]CC)- (present on admission)  Assessment & Plan  7/27 Plt:153 < 140  Sepsis?   Monitor cbc    SANKET (acute kidney injury) (HCC)- (present on admission)  Assessment & Plan  Acute on chronic.    Fluids  Serial BMP showing improvement.  7/28  Cr:1.7, BUN:25  7/27 Cr:1.44, BUN:30  Stop IV fluids due to hx of EF:45%  Monitor I/O's, labs.    Hyperlipidemia- (present on admission)  Assessment & Plan  Continue lipitor     History of renal transplant- (present on admission)  Assessment & Plan  Continue prograf and cellcept   Restart home prednisone 5mg dose 7/28  Nephrology consulting.  Monitor labs.       VTE prophylaxis: therapeutic anticoagulation with apixaban    I have performed a physical exam and reviewed and updated ROS and Plan today (7/28/2022). In review of yesterday's note (7/27/2022), there are no changes except as documented above.

## 2022-07-28 NOTE — CARE PLAN
The patient is Watcher - Medium risk of patient condition declining or worsening    Shift Goals  Clinical Goals: Remain hemodynamically stable  Problem: Knowledge Deficit - Standard  Goal: Patient and family/care givers will demonstrate understanding of plan of care, disease process/condition, diagnostic tests and medications  Outcome: Progressing     Problem: Physical Regulation  Goal: Diagnostic test results will improve  Outcome: Progressing     Patient Goals: Get rid of infection  Family Goals: JUDITH    Progress made toward(s) clinical / shift goals:       Patient is not progressing towards the following goals:

## 2022-07-28 NOTE — PROGRESS NOTES
"[facility] -  PROGRESS NOTE               Author: Roderick Ramirez M.D. Date & Time: 7/28/2022  9:07 AM     HPI:  Patient is a 66 year old male with a PMHx of HTN, polycystic kidney disease with [place] [facility] [place]s, HLD, DM, and recent ESBL e coli bacteremia here for rigors and fatigue.  Had dysuria with decreased UOP.  Was admitted to ICU and started on pressors for hypotension.  Started on IV abx.   Found to have GNR on blood cultures.  This morning off of pressors.  Feels better.  No pain or SOB.  Dysuria resolving.  Creatinine down to 1.8.      DAILY NEPHROLOGY SUMMARY:  7/27: Feeling better.  Denies pain or Sob.  Eating breakfast  7/28: Feeling well.  Creatinine 1.7, states taht around his baseline.  Denies pain or SOB.  Walked yesterday.    REVIEW OF SYSTEMS:    10 point ROS reviewed and is as per HPI/daily summary or otherwise negative    PMH/PSH/SH/FH:   Reviewed and unchanged since admission note    CURRENT MEDICATIONS:   Reviewed from admission to present day    VS:  /78   Pulse 100   Temp 35.8 °C (96.5 °F)   Resp 17   Ht 1.969 m (6' 5.5\")   Wt 111 kg (244 lb 11.4 oz)   SpO2 94%   BMI 28.65 kg/m²     Physical Exam  Constitutional:       Appearance: He is ill-appearing.   HENT:      Head: Normocephalic.      Right Ear: External ear normal.      Left Ear: External ear normal.      Nose: Nose normal.      Mouth/Throat:      Mouth: Mucous membranes are dry.   Eyes:      General:         Right eye: No discharge.         Left eye: No discharge.   Cardiovascular:      Pulses: Normal pulses.   Abdominal:      General: Abdomen is flat.      Palpations: Abdomen is soft.   Musculoskeletal:         General: No swelling.      Cervical back: Normal range of motion.   Skin:     General: Skin is warm.   Neurological:      General: No focal deficit present.      Mental Status: He is alert.   Psychiatric:         Mood and Affect: Mood normal.   Fluids:  In: " 750 [P.O.:750]  Out: 1550     LABS:  Recent Labs     07/26/22  1315 07/27/22  0415 07/28/22  0312   SODIUM 133* 133* 136   POTASSIUM 5.0 4.5 3.9   CHLORIDE 101 101 106   CO2 21 21 22   GLUCOSE 310* 232* 166*   BUN 29* 30* 25*   CREATININE 1.68* 1.44* 1.70*   CALCIUM 8.2* 8.1* 8.0*       IMAGING:   All imaging reviewed from admission to present day    IMPRESSION:  # SANKET    - Likely multifactorial with sepsis and pre-renal etiologies initially improved but now worsening again    - Got CT with contrast 7/25  # Renal Transplant    - History of renal transplant 2010 at [facility] [place]    - On IS meds, being continued  # Bacteremia    - On Meropenem    - Cultures pending  # Septic Shock  # UTI  # Right lower extremity wound  # PAF  # DM  # GERD  # Thrombocytopenia  # CKD-MBD    - Hypocalcemia - resolved    - Phos 2.8    - Check vitamin D 27 and PTH 44  # Anemia  # Hyponatremia    - Mild, monitor        PLAN:  - Tacrolimus level tomorrow am  - Continue home IS meds, on stress dose steroids, resume home pred when done  - Abx per primary team  - Ergo qweek  - Dose all meds per eGFR      Thank you for the consultation!

## 2022-07-28 NOTE — PROGRESS NOTES
Infectious Disease Progress Note    Author: Tania Briggs M.D. Date & Time of service: 2022  11:23 AM    Chief Complaint:  Gram neg septic shock    Interval History:  66 y.o. diabetic male known to ID service with a history of polycystic kidney disease leading to end-stage renal disease status post renal transplant in ,  recently hospitalized in early July for ESBL E. coli bacteremia secondary to urinary source admitted on 2022 AF WBC 11.4 off pressors and feeling much better Plan of care reviewed. Wound pictures reviewed    Labs Reviewed and Medications Reviewed.    Review of Systems:  Review of Systems   Constitutional: Negative for fever.   Respiratory: Negative for cough and shortness of breath.    Gastrointestinal: Negative for nausea and vomiting.   Genitourinary: Negative for dysuria.   All other systems reviewed and are negative.      Hemodynamics:  Temp (24hrs), Av.2 °C (97.1 °F), Min:35.7 °C (96.3 °F), Max:36.7 °C (98.1 °F)  Temperature: 35.8 °C (96.5 °F)  Pulse  Av  Min: 46  Max: 134   Blood Pressure : 137/78       Physical Exam:  Physical Exam  Vitals and nursing note reviewed.   Constitutional:       General: He is not in acute distress.     Appearance: He is not ill-appearing, toxic-appearing or diaphoretic.   HENT:      Mouth/Throat:      Pharynx: No oropharyngeal exudate.   Eyes:      General: No scleral icterus.     Extraocular Movements: Extraocular movements intact.      Pupils: Pupils are equal, round, and reactive to light.   Cardiovascular:      Rate and Rhythm: Normal rate. Rhythm irregular.   Pulmonary:      Effort: Pulmonary effort is normal. No respiratory distress.      Breath sounds: No stridor.   Abdominal:      General: There is no distension.      Palpations: Abdomen is soft.      Tenderness: There is no abdominal tenderness.   Musculoskeletal:         General: Signs of injury present.      Cervical back: Neck supple. No rigidity.      Right lower  leg: No edema.      Left lower leg: No edema.      Comments: RLE dressed  Left knee wound healing well   Skin:     Coloration: Skin is not jaundiced.      Findings: Bruising present.   Neurological:      General: No focal deficit present.      Mental Status: He is alert and oriented to person, place, and time.   Psychiatric:         Mood and Affect: Mood normal.         Behavior: Behavior normal.         Meds:    Current Facility-Administered Medications:   •  [START ON 7/29/2022] predniSONE  •  vitamin D2 (Ergocalciferol)  •  midodrine  •  meropenem  •  apixaban  •  atorvastatin  •  [Held by provider] carvedilol  •  gabapentin  •  mycophenolate  •  tacrolimus  •  acetaminophen  •  insulin regular **AND** POC blood glucose manual result **AND** NOTIFY MD and PharmD **AND** Administer 20 grams of glucose (approximately 8 ounces of fruit juice) every 15 minutes PRN FSBG less than 70 mg/dL **AND** dextrose bolus    Labs:  Recent Labs     07/25/22 1943 07/27/22 0415 07/28/22 0312   WBC 7.7 12.1* 11.4*   RBC 4.11* 3.37* 3.19*   HEMOGLOBIN 10.8* 8.9* 8.4*   HEMATOCRIT 35.6* 29.1* 27.2*   MCV 86.6 86.4 85.3   MCH 26.3* 26.4* 26.3*   RDW 55.6* 54.6* 54.0*   PLATELETCT 123* 140* 153*   MPV 11.6 11.5 12.2   NEUTSPOLYS 94.00* 89.90* 83.20*   LYMPHOCYTES 4.00* 3.80* 7.30*   MONOCYTES 0.80 5.50 8.40   EOSINOPHILS 0.10 0.00 0.10   BASOPHILS 0.30 0.20 0.30     Recent Labs     07/26/22 1315 07/27/22 0415 07/28/22 0312   SODIUM 133* 133* 136   POTASSIUM 5.0 4.5 3.9   CHLORIDE 101 101 106   CO2 21 21 22   GLUCOSE 310* 232* 166*   BUN 29* 30* 25*     Recent Labs     07/26/22 1315 07/27/22 0415 07/28/22 0312   ALBUMIN 2.7* 2.6* 2.6*   TBILIRUBIN 0.6 0.3 0.2   ALKPHOSPHAT 93 82 71   TOTPROTEIN 5.1* 5.1* 4.7*   ALTSGPT 18 12 10   ASTSGOT 19 12 9*   CREATININE 1.68* 1.44* 1.70*       Imaging:  CT-ABDOMEN-PELVIS WITH    Result Date: 7/25/2022 7/25/2022 9:18 PM HISTORY/REASON FOR EXAM:  Sepsis. TECHNIQUE/EXAM DESCRIPTION:   CT  scan of the abdomen and pelvis with contrast. Contrast-enhanced helical scanning was obtained from the diaphragmatic domes through the pubic symphysis following the bolus administration of nonionic contrast without complication. 95 mL of Omnipaque 350 nonionic contrast was administered without complication. Low dose optimization technique was utilized for this CT exam including automated exposure control and adjustment of the mA and/or kV according to patient size. COMPARISON: June 30, 2022 FINDINGS: Lower Chest: Linear densities the bilateral lung bases favor changes of atelectasis. Liver: Portal edema is seen. Spleen: Unremarkable. Pancreas: Unremarkable. Gallbladder: No calcified stones. Biliary: Nondilated. Adrenal glands: Normal. Kidneys: The right kidney is not visualized. There is transplant kidney seen in the right pelvis which appears unremarkable without visualized hydronephrosis. Native left kidney is visualized which appears enlarged with innumerable variable size renal cyst. Bowel: Fluid-filled prominence of small bowel is seen. The appendix appears within normal limits. Lymph nodes: No adenopathy. Vasculature: Atherosclerotic changes are seen including atherosclerotic coronary artery calcifications. 3.0 cm fusiform infrarenal abdominal aortic aneurysm is seen. Peritoneum: Unremarkable without ascites. Musculoskeletal: No acute or destructive process. Pelvis: No adenopathy or free fluid. Fat-containing left inguinal hernia is seen.     1.  Fluid-filled prominence of small bowel, consider ileus and/or enteritis. Radiographic follow-up to resolution recommended as clinically appropriate to exclude progression to obstructive changes. 2.  Periportal edema, nonspecific, can be associated with acute hepatitis in the appropriate clinical setting. 3.  Changes of the left kidney most compatible with polycystic kidney disease. 4.  3.0 cm fusiform infrarenal abdominal aortic aneurysm, radiographic follow-up and  surveillance recommended as clinically appropriate. 5.  Fat-containing left inguinal hernia 6.  Atherosclerosis and atherosclerotic coronary artery disease    CT-RENAL COLIC EVALUATION(A/P W/O)    Result Date: 6/30/2022 6/30/2022 11:05 PM HISTORY/REASON FOR EXAM:  Flank pain, kidney stone suspected; NAUSEA/VOMITING; PAINFUL URINATION. TECHNIQUE/EXAM DESCRIPTION: CT scan of the abdomen and pelvis without contrast. Noncontrast helical scanning was obtained from the diaphragmatic domes through the pubic symphysis. Low dose optimization technique was utilized for this CT exam including automated exposure control and adjustment of the mA and/or kV according to patient size. COMPARISON: May 27, 2022 FINDINGS: Lower Chest: Linear densities the bilateral lung bases favor changes of atelectasis. Liver: Normal. Spleen: Unremarkable. Pancreas: Unremarkable. Gallbladder: No calcified stones. Biliary: Nondilated. Adrenal glands: Normal. Kidneys: Right pelvic kidney is seen, appearance suggests transplant kidney. The right kidney is otherwise not visualized and is likely surgically absent. There is enlargement of the left kidney with innumerable cysts, appearance most compatible with polycystic kidney disease. No hydronephrosis of the transplant kidney or renal calculi are appreciated. Bowel: No obstruction or acute inflammation. Scattered colonic diverticula are seen. Lymph nodes: No adenopathy. Vasculature: Unremarkable. Peritoneum: Unremarkable without ascites. Musculoskeletal: No acute or destructive process. Pelvis: No adenopathy or free fluid. Fat-containing left inguinal hernia is seen.     1.  Changes compatible with polycystic kidney disease. 2.  Diverticulosis 3.  Fat-containing left inguinal hernia    DX-CHEST-FOR LINE PLACEMENT Perform procedure in: Patient's Room    Result Date: 7/1/2022 7/1/2022 5:03 AM HISTORY/REASON FOR EXAM: Central line placement TECHNIQUE/EXAM DESCRIPTION:  PA and lateral views of the  chest. COMPARISON: None FINDINGS: Right internal jugular central line is seen terminating at the right atriocaval junction.   The cardiac silhouette appears within normal limits. Atherosclerotic calcification of the aorta is noted.  The central pulmonary vasculature appears normal. Bilateral lung volumes are diminished.  Hazy interstitial bilateral pulmonary opacities are seen. No significant pleural effusions are identified. The bony structures appear age-appropriate.     1.  Interstitial edema and/or infiltrate. 2.  Atherosclerosis    DX-CHEST-PORTABLE (1 VIEW)    Result Date: 7/25/2022 7/25/2022 7:38 PM HISTORY/REASON FOR EXAM:  possible sepsis. TECHNIQUE/EXAM DESCRIPTION AND NUMBER OF VIEWS: Single AP view of the chest. COMPARISON: 7/1/2022 FINDINGS: Heart: The cardiac silhouette is stable in size. Mediastinum: Normal contours. Calcification in the aorta. Lungs: Prominent reticular markings are unchanged. No pneumothorax is identified. Pleura: No pleural fluid. Bones: No suspicious bony lesions. Lines/tubes: Right internal jugular catheter has been removed.     No significant interval change.    DX-CHEST-PORTABLE (1 VIEW)    Result Date: 6/30/2022 6/30/2022 10:44 PM HISTORY/REASON FOR EXAM: Possible sepsis. TECHNIQUE/EXAM DESCRIPTION:  Single AP view of the chest. COMPARISON: June 14, 2022 FINDINGS: The cardiac silhouette appears within normal limits. Atherosclerotic calcification of the aorta is noted.  The central pulmonary vasculature appears normal. The lungs appear well expanded bilaterally.  Patchy bilateral pulmonary opacities are seen. No significant pleural effusions are identified. The bony structures appear age-appropriate.     1.  Patchy bilateral pulmonary infiltrates or edema. 2.  Atherosclerosis    EC-ECHOCARDIOGRAM COMPLETE W/ CONT    Result Date: 7/2/2022  Transthoracic Echo Report Echocardiography Laboratory CONCLUSIONS Compared to the prior echo on 05/25/2022. Definite vegetations are not  identified, failure to do so does not exclude their presence or the diagnosis of endocarditis, if clinically indicated, a transesophageal study would be useful. Mildly reduced left ventricular systolic function. The left ventricular ejection fraction is visually estimated to be 45%. Normal right ventricular size and systolic function. Aortic valve sclerosis without significant stenosis. No pericardial effusion. CIARA FORRESTER Exam Date:          LV EF:  45    % FINDINGS Left Ventricle Contrast was used to enhance visualization of the endocardial border. 3 mL of contrast was administered. Existing IV was used at the right arm. Normal left ventricular chamber size. Normal left ventricular wall thickness. Mildly reduced left ventricular systolic function. The left ventricular ejection fraction is visually estimated to be 45%. Global hypokinesis with regional variation. Grade I diastolic dysfunction. Right Ventricle Normal right ventricular size and systolic function. Right Atrium Normal right atrial size. The inferior vena cava is not well visualized. Left Atrium Normal left atrial size. Left atrial volume index is 31 mL/sq m. Mitral Valve Structurally normal mitral valve without significant stenosis. Trace mitral regurgitation. Aortic Valve Tricuspid aortic valve. Aortic valve sclerosis without significant stenosis. No aortic insufficiency. Tricuspid Valve Structurally normal tricuspid valve without significant stenosis. Trace tricuspid regurgitation. Right ventricular systolic pressure is estimated to be 28 mmHg. Pulmonic Valve Structurally normal pulmonic valve without significant stenosis. Trace pulmonic insufficiency. Pericardium No pericardial effusion. Aorta Normal aortic root for body surface area. The ascending aorta diameter is 3.3 cm. Mikel Rivera MD (Electronically Signed) Final Date:     02 July 2022                 14:31    IR-MIDLINE CATHETER INSERTION WO GUIDANCE > AGE 4    Result Date:  7/5/2022  HISTORY/REASON FOR EXAM:  Midline Placement   TECHNIQUE/EXAM DESCRIPTION AND NUMBER OF VIEWS: Midline insertion with ultrasound guidance.  FINDINGS: Midline insertion with Ultrasound Guidance was performed by qualified nursing staff without the assistance of a Radiologist. Midline positioning as measured by RN or as appropriate length of catheter selected.              Ultrasound-guided midline placement performed by qualified nursing staff as above.       Micro:  Results     Procedure Component Value Units Date/Time    URINE CULTURE(NEW) [[id number]]  (Abnormal)  (Susceptibility) Collected: 07/25/22 2001    Order Status: Completed Specimen: Urine, Clean Catch Updated: 07/28/22 1059     Significant Indicator POS     Source UR     Site URINE, CLEAN CATCH     Culture Result -      Escherichia coli ESBL  >100,000 cfu/mL      Narrative:      Indication for culture:->Evaluation for sepsis without a  clear source of infection  Indication for culture:->Evaluation for sepsis without a    Susceptibility     Escherichia coli esbl (1)     Antibiotic Interpretation Microscan   Method Status    Ampicillin Resistant >16 mcg/mL CESIA Final    Ceftriaxone Resistant >32 mcg/mL CESIA Final    Cefazolin Resistant >16 mcg/mL CESIA Final     Breakpoints when Cefazolin is used for therapy of infections  other than uncomplicated UTIs due to Enterobacterales are as  follows:  CESIA and Interpretation:  <=2 S  4 I  >=8 R         Ciprofloxacin Sensitive <=0.25 mcg/mL CESIA Final    Cefepime Resistant >16 mcg/mL CESIA Final    Cefuroxime Resistant >16 mcg/mL CESIA Final    Ampicillin/sulbactam Intermediate 16/8 mcg/mL CESIA Final    Tobramycin Sensitive <=2 mcg/mL CESIA Final    Nitrofurantoin Sensitive <=32 mcg/mL CESIA Final    Gentamicin Sensitive <=2 mcg/mL CESIA Final    Levofloxacin Sensitive <=0.5 mcg/mL CESIA Final    Minocycline Sensitive <=4 mcg/mL CESIA Final    Pip/Tazobactam Sensitive <=8 mcg/mL CESIA Final    Trimeth/Sulfa Resistant >2/38 mcg/mL  "CESIA Final    Tigecycline Sensitive <=2 mcg/mL CESIA Final                   BLOOD CULTURE [[id number]]  (Abnormal) Collected: 07/25/22 1935    Order Status: Completed Specimen: Blood from Peripheral Updated: 07/28/22 0842     Significant Indicator POS     Source BLD     Site PERIPHERAL     Culture Result Growth detected by Bactec instrument. 07/26/2022  05:59      Escherichia coli ESBL  See previous culture for sensitivity report.      Narrative:      CALL  [facility]  [facility] tel. [phone],  CALLED  [facility] tel. [phone] 07/26/2022, 06:00, RB PERF. RESULTS CALLED TO: RN  [phone]  Per Hospital Policy: Only change Specimen Src: to \"Line\" if  specified by physician order.  Right Forearm/Arm    BLOOD CULTURE [[id number]]  (Abnormal)  (Susceptibility) Collected: 07/25/22 1943    Order Status: Completed Specimen: Blood from Peripheral Updated: 07/28/22 0836     Significant Indicator POS     Source BLD     Site PERIPHERAL     Culture Result Growth detected by Bactec instrument. 07/26/2022  07:21      Escherichia coli ESBL    Narrative:      CALL  [facility]  [facility] tel. [phone],  CALLED  [facility] tel. [phone] 07/26/2022, 07:25, RB PERF. RESULTS CALLED TO: RN  [phone]  Per Hospital Policy: Only change Specimen Src: to \"Line\" if  specified by physician order.  Left Forearm/Arm    Susceptibility     Escherichia coli esbl (1)     Antibiotic Interpretation Microscan   Method Status    Ampicillin Resistant >16 mcg/mL CESIA Final    Ceftriaxone Resistant >32 mcg/mL CESIA Final    Cefazolin Resistant >16 mcg/mL CESIA Final    Ciprofloxacin Sensitive <=0.25 mcg/mL CESIA Final    Cefepime Resistant >16 mcg/mL CESIA Final    Cefuroxime Resistant >16 mcg/mL CESIA Final    Ampicillin/sulbactam Intermediate 16/8 mcg/mL CESIA Final    Ertapenem Sensitive <=0.5 mcg/mL CESIA Final    Tobramycin Sensitive <=2 mcg/mL CESIA Final    Gentamicin Sensitive <=2 mcg/mL CESIA Final    Minocycline Sensitive <=4 mcg/mL CESIA Final    Moxifloxacin Sensitive <=2 mcg/mL CESIA Final    Pip/Tazobactam " "Sensitive <=8 mcg/mL CESIA Final    Trimeth/Sulfa Resistant >2/38 mcg/mL CESIA Final    Tigecycline Sensitive <=2 mcg/mL CESIA Final                   BLOOD CULTURE [[id number]] Collected: 07/26/22 1635    Order Status: Completed Specimen: Blood from Peripheral Updated: 07/27/22 0709     Significant Indicator NEG     Source BLD     Site PERIPHERAL     Culture Result No Growth  Note: Blood cultures are incubated for 5 days and  are monitored continuously.Positive blood cultures  are called to the RN and reported as soon as  they are identified.      Narrative:      Per Hospital Policy: Only change Specimen Src: to \"Line\" if  specified by physician order.  Right Forearm/Arm    BLOOD CULTURE [[id number]] Collected: 07/26/22 1630    Order Status: Completed Specimen: Blood from Peripheral Updated: 07/27/22 0709     Significant Indicator NEG     Source BLD     Site PERIPHERAL     Culture Result No Growth  Note: Blood cultures are incubated for 5 days and  are monitored continuously.Positive blood cultures  are called to the RN and reported as soon as  they are identified.      Narrative:      Per Hospital Policy: Only change Specimen Src: to \"Line\" if  specified by physician order.  Left Forearm/Arm    Urinalysis [[id number]]     Order Status: Sent Specimen: Urine     URINALYSIS [[id number]]  (Abnormal) Collected: 07/25/22 2001    Order Status: Completed Specimen: Urine, Clean Catch Updated: 07/25/22 2039     Color Yellow     Character Cloudy     Specific Gravity 1.018     Ph 5.0     Glucose Negative mg/dL      Ketones Trace mg/dL      Protein 100 mg/dL      Bilirubin Negative     Urobilinogen, Urine 0.2     Nitrite Negative     Leukocyte Esterase Moderate     Occult Blood Moderate     Micro Urine Req Microscopic    Narrative:      Indication for culture:->Evaluation for sepsis without a  clear source of infection          Assessment:  Active Hospital Problems    Diagnosis    • *Sepsis(995.91) [A41.9]    • Secondary adrenal " insufficiency ([facility]) [E27.49]    • Septic shock ([facility]) [A41.9, R65.21]    • Immunosuppressed status ([facility]) [D84.9]    • Urinary tract infection [N39.0]    • Wound of right lower extremity [S81.801A]    • PAF (paroxysmal atrial fibrillation) ([facility]) [I48.0]    • Diabetes mellitus with coincident hypertension ([facility]) [E11.9, I10]    • Thrombocytopenia ([facility]) [D69.6]    • GERD (gastroesophageal reflux disease) [K21.9]    • SANKET (acute kidney injury) ([facility]) [N17.9]    • Hyperlipidemia [E78.5]    • History of renal transplant [Z94.0]      Septic shock-ESBL Ecoli  UTI ESBL Ecoli  SANKET  Thrombocytopenia  Renal transplant on chronic immunosuppression  Diabetes mellitus   RLE wounds-significantly improved  Left knee nearly resolved     PLAN:   Shock now resolved  Afebrile  Mild leukocytosis  Repeat cultures 7/26 neg to date-OK to place midline  Unclear why infection recurred-no nidus of infection seen in bladder (query wall infiltration by Ecoli), Kidney, or prostate (query subclinical prostatitis)  No known reflux. Does not have Epps, SP cath  Continue IV meropenem 500 mg every 6 hours while in the hospital  Can use once daily ertapenem as outpatient  Stop date IV antibiotics 8/8/2022 then follow with oral ciprofloxacin for 2 weeks as suspicion for prostatic involvement contributing to recurrence  EKG with   Consider Urology eval    Continue wound care  Keep BS under 150 to help control current infection        .

## 2022-07-28 NOTE — PROGRESS NOTES
Report received from [facility] shift RN, assumed patient care. Patient is calmly resting in bed, no signs of distress, even and unlabored breathing noted. Pt on room air. Tele box on and in place. Patient has call light within reach, fall precautions in place. Will continue to monitor.

## 2022-07-29 ENCOUNTER — APPOINTMENT (OUTPATIENT)
Dept: RADIOLOGY | Facility: MEDICAL CENTER | Age: 67
DRG: 698 | End: 2022-07-29
Attending: HOSPITALIST
Payer: MEDICARE

## 2022-07-29 ENCOUNTER — HOME HEALTH ADMISSION (OUTPATIENT)
Dept: HOME HEALTH SERVICES | Facility: HOME HEALTHCARE | Age: 67
End: 2022-07-29
Payer: MEDICARE

## 2022-07-29 PROBLEM — I82.622 ACUTE DEEP VEIN THROMBOSIS (DVT) OF LEFT UPPER EXTREMITY (HCC): Status: ACTIVE | Noted: 2022-07-29

## 2022-07-29 LAB
ALBUMIN SERPL BCP-MCNC: 1.8 G/DL (ref 3.2–4.9)
ALBUMIN/GLOB SERPL: 0.9 G/DL
ALP SERPL-CCNC: 57 U/L (ref 30–99)
ALT SERPL-CCNC: 8 U/L (ref 2–50)
ANION GAP SERPL CALC-SCNC: 11 MMOL/L (ref 7–16)
AST SERPL-CCNC: 13 U/L (ref 12–45)
BASOPHILS # BLD AUTO: 0.5 % (ref 0–1.8)
BASOPHILS # BLD: 0.03 K/UL (ref 0–0.12)
BILIRUB SERPL-MCNC: 0.3 MG/DL (ref 0.1–1.5)
BUN SERPL-MCNC: 19 MG/DL (ref 8–22)
CALCIUM SERPL-MCNC: 7 MG/DL (ref 8.5–10.5)
CHLORIDE SERPL-SCNC: 109 MMOL/L (ref 96–112)
CO2 SERPL-SCNC: 18 MMOL/L (ref 20–33)
CREAT SERPL-MCNC: 1.29 MG/DL (ref 0.5–1.4)
EOSINOPHIL # BLD AUTO: 0.11 K/UL (ref 0–0.51)
EOSINOPHIL NFR BLD: 1.9 % (ref 0–6.9)
ERYTHROCYTE [DISTWIDTH] IN BLOOD BY AUTOMATED COUNT: 55.8 FL (ref 35.9–50)
GFR SERPLBLD CREATININE-BSD FMLA CKD-EPI: 61 ML/MIN/1.73 M 2
GLOBULIN SER CALC-MCNC: 2 G/DL (ref 1.9–3.5)
GLUCOSE BLD STRIP.AUTO-MCNC: 131 MG/DL (ref 65–99)
GLUCOSE BLD STRIP.AUTO-MCNC: 141 MG/DL (ref 65–99)
GLUCOSE BLD STRIP.AUTO-MCNC: 154 MG/DL (ref 65–99)
GLUCOSE BLD STRIP.AUTO-MCNC: 222 MG/DL (ref 65–99)
GLUCOSE SERPL-MCNC: 114 MG/DL (ref 65–99)
HCT VFR BLD AUTO: 27 % (ref 42–52)
HGB BLD-MCNC: 8.2 G/DL (ref 14–18)
IMM GRANULOCYTES # BLD AUTO: 0.07 K/UL (ref 0–0.11)
IMM GRANULOCYTES NFR BLD AUTO: 1.2 % (ref 0–0.9)
INR PPP: 1.11 (ref 0.87–1.13)
LYMPHOCYTES # BLD AUTO: 0.68 K/UL (ref 1–4.8)
LYMPHOCYTES NFR BLD: 11.8 % (ref 22–41)
MAGNESIUM SERPL-MCNC: 1.6 MG/DL (ref 1.5–2.5)
MCH RBC QN AUTO: 26.6 PG (ref 27–33)
MCHC RBC AUTO-ENTMCNC: 30.4 G/DL (ref 33.7–35.3)
MCV RBC AUTO: 87.7 FL (ref 81.4–97.8)
MONOCYTES # BLD AUTO: 0.61 K/UL (ref 0–0.85)
MONOCYTES NFR BLD AUTO: 10.6 % (ref 0–13.4)
NEUTROPHILS # BLD AUTO: 4.27 K/UL (ref 1.82–7.42)
NEUTROPHILS NFR BLD: 74 % (ref 44–72)
NRBC # BLD AUTO: 0 K/UL
NRBC BLD-RTO: 0 /100 WBC
PHOSPHATE SERPL-MCNC: 2.1 MG/DL (ref 2.5–4.5)
PLATELET # BLD AUTO: 119 K/UL (ref 164–446)
PMV BLD AUTO: 11.3 FL (ref 9–12.9)
POTASSIUM SERPL-SCNC: 3.3 MMOL/L (ref 3.6–5.5)
PROT SERPL-MCNC: 3.8 G/DL (ref 6–8.2)
PROTHROMBIN TIME: 14.2 SEC (ref 12–14.6)
RBC # BLD AUTO: 3.08 M/UL (ref 4.7–6.1)
SODIUM SERPL-SCNC: 138 MMOL/L (ref 135–145)
WBC # BLD AUTO: 5.8 K/UL (ref 4.8–10.8)

## 2022-07-29 PROCEDURE — 99232 SBSQ HOSP IP/OBS MODERATE 35: CPT | Performed by: INTERNAL MEDICINE

## 2022-07-29 PROCEDURE — 700102 HCHG RX REV CODE 250 W/ 637 OVERRIDE(OP): Performed by: STUDENT IN AN ORGANIZED HEALTH CARE EDUCATION/TRAINING PROGRAM

## 2022-07-29 PROCEDURE — 85610 PROTHROMBIN TIME: CPT

## 2022-07-29 PROCEDURE — 700105 HCHG RX REV CODE 258: Performed by: INTERNAL MEDICINE

## 2022-07-29 PROCEDURE — 82962 GLUCOSE BLOOD TEST: CPT | Mod: 91

## 2022-07-29 PROCEDURE — 99233 SBSQ HOSP IP/OBS HIGH 50: CPT | Performed by: HOSPITALIST

## 2022-07-29 PROCEDURE — A9270 NON-COVERED ITEM OR SERVICE: HCPCS | Performed by: INTERNAL MEDICINE

## 2022-07-29 PROCEDURE — 700111 HCHG RX REV CODE 636 W/ 250 OVERRIDE (IP): Performed by: STUDENT IN AN ORGANIZED HEALTH CARE EDUCATION/TRAINING PROGRAM

## 2022-07-29 PROCEDURE — 93970 EXTREMITY STUDY: CPT

## 2022-07-29 PROCEDURE — 80053 COMPREHEN METABOLIC PANEL: CPT

## 2022-07-29 PROCEDURE — A9270 NON-COVERED ITEM OR SERVICE: HCPCS | Performed by: STUDENT IN AN ORGANIZED HEALTH CARE EDUCATION/TRAINING PROGRAM

## 2022-07-29 PROCEDURE — 83735 ASSAY OF MAGNESIUM: CPT

## 2022-07-29 PROCEDURE — 84100 ASSAY OF PHOSPHORUS: CPT

## 2022-07-29 PROCEDURE — 85025 COMPLETE CBC W/AUTO DIFF WBC: CPT

## 2022-07-29 PROCEDURE — A9270 NON-COVERED ITEM OR SERVICE: HCPCS | Performed by: HOSPITALIST

## 2022-07-29 PROCEDURE — 700111 HCHG RX REV CODE 636 W/ 250 OVERRIDE (IP): Performed by: HOSPITALIST

## 2022-07-29 PROCEDURE — 770001 HCHG ROOM/CARE - MED/SURG/GYN PRIV*

## 2022-07-29 PROCEDURE — 700102 HCHG RX REV CODE 250 W/ 637 OVERRIDE(OP): Performed by: HOSPITALIST

## 2022-07-29 PROCEDURE — 700102 HCHG RX REV CODE 250 W/ 637 OVERRIDE(OP): Performed by: INTERNAL MEDICINE

## 2022-07-29 PROCEDURE — 700111 HCHG RX REV CODE 636 W/ 250 OVERRIDE (IP): Performed by: INTERNAL MEDICINE

## 2022-07-29 RX ORDER — ENOXAPARIN SODIUM 150 MG/ML
1 INJECTION SUBCUTANEOUS EVERY 12 HOURS
Status: DISCONTINUED | OUTPATIENT
Start: 2022-07-29 | End: 2022-07-31

## 2022-07-29 RX ORDER — 0.9 % SODIUM CHLORIDE 0.9 %
10 VIAL (ML) INJECTION PRN
Status: CANCELLED | OUTPATIENT
Start: 2022-07-29

## 2022-07-29 RX ORDER — DEXTROSE MONOHYDRATE 25 G/50ML
25 INJECTION, SOLUTION INTRAVENOUS
Status: DISCONTINUED | OUTPATIENT
Start: 2022-07-29 | End: 2022-08-01 | Stop reason: HOSPADM

## 2022-07-29 RX ORDER — 0.9 % SODIUM CHLORIDE 0.9 %
3 VIAL (ML) INJECTION PRN
Status: CANCELLED | OUTPATIENT
Start: 2022-07-29

## 2022-07-29 RX ORDER — POTASSIUM CHLORIDE 20 MEQ/1
40 TABLET, EXTENDED RELEASE ORAL ONCE
Status: COMPLETED | OUTPATIENT
Start: 2022-07-29 | End: 2022-07-29

## 2022-07-29 RX ORDER — 0.9 % SODIUM CHLORIDE 0.9 %
VIAL (ML) INJECTION PRN
Status: CANCELLED | OUTPATIENT
Start: 2022-07-29

## 2022-07-29 RX ORDER — WARFARIN SODIUM 5 MG/1
5 TABLET ORAL
Status: COMPLETED | OUTPATIENT
Start: 2022-07-29 | End: 2022-07-29

## 2022-07-29 RX ORDER — HEPARIN SODIUM (PORCINE) LOCK FLUSH IV SOLN 100 UNIT/ML 100 UNIT/ML
500 SOLUTION INTRAVENOUS PRN
Status: CANCELLED | OUTPATIENT
Start: 2022-07-29

## 2022-07-29 RX ORDER — ZOLPIDEM TARTRATE 5 MG/1
5 TABLET ORAL NIGHTLY PRN
Status: DISCONTINUED | OUTPATIENT
Start: 2022-07-29 | End: 2022-08-01 | Stop reason: HOSPADM

## 2022-07-29 RX ORDER — DEXTROSE MONOHYDRATE 25 G/50ML
25 INJECTION, SOLUTION INTRAVENOUS
Status: DISCONTINUED | OUTPATIENT
Start: 2022-07-29 | End: 2022-07-29

## 2022-07-29 RX ADMIN — MYCOPHENOLATE MOFETIL 500 MG: 250 CAPSULE ORAL at 05:18

## 2022-07-29 RX ADMIN — DIBASIC SODIUM PHOSPHATE, MONOBASIC POTASSIUM PHOSPHATE AND MONOBASIC SODIUM PHOSPHATE 500 MG: 852; 155; 130 TABLET ORAL at 13:13

## 2022-07-29 RX ADMIN — MEROPENEM 500 MG: 500 INJECTION, POWDER, FOR SOLUTION INTRAVENOUS at 23:21

## 2022-07-29 RX ADMIN — APIXABAN 5 MG: 5 TABLET, FILM COATED ORAL at 05:18

## 2022-07-29 RX ADMIN — TACROLIMUS 1 MG: 1 CAPSULE ORAL at 17:20

## 2022-07-29 RX ADMIN — ACETAMINOPHEN 650 MG: 325 TABLET, FILM COATED ORAL at 23:22

## 2022-07-29 RX ADMIN — POTASSIUM CHLORIDE 40 MEQ: 1500 TABLET, EXTENDED RELEASE ORAL at 13:13

## 2022-07-29 RX ADMIN — DIBASIC SODIUM PHOSPHATE, MONOBASIC POTASSIUM PHOSPHATE AND MONOBASIC SODIUM PHOSPHATE 250 MG: 852; 155; 130 TABLET ORAL at 23:22

## 2022-07-29 RX ADMIN — DIBASIC SODIUM PHOSPHATE, MONOBASIC POTASSIUM PHOSPHATE AND MONOBASIC SODIUM PHOSPHATE 250 MG: 852; 155; 130 TABLET ORAL at 23:48

## 2022-07-29 RX ADMIN — MIDODRINE HYDROCHLORIDE 5 MG: 5 TABLET ORAL at 05:17

## 2022-07-29 RX ADMIN — MEROPENEM 500 MG: 500 INJECTION, POWDER, FOR SOLUTION INTRAVENOUS at 12:16

## 2022-07-29 RX ADMIN — PREDNISONE 5 MG: 10 TABLET ORAL at 05:17

## 2022-07-29 RX ADMIN — MYCOPHENOLATE MOFETIL 500 MG: 250 CAPSULE ORAL at 17:20

## 2022-07-29 RX ADMIN — TACROLIMUS 1 MG: 1 CAPSULE ORAL at 05:18

## 2022-07-29 RX ADMIN — GABAPENTIN 600 MG: 300 CAPSULE ORAL at 20:41

## 2022-07-29 RX ADMIN — DIBASIC SODIUM PHOSPHATE, MONOBASIC POTASSIUM PHOSPHATE AND MONOBASIC SODIUM PHOSPHATE 500 MG: 852; 155; 130 TABLET ORAL at 17:19

## 2022-07-29 RX ADMIN — MEROPENEM 500 MG: 500 INJECTION, POWDER, FOR SOLUTION INTRAVENOUS at 05:17

## 2022-07-29 RX ADMIN — INSULIN HUMAN 4 UNITS: 100 INJECTION, SOLUTION PARENTERAL at 12:00

## 2022-07-29 RX ADMIN — WARFARIN SODIUM 5 MG: 5 TABLET ORAL at 18:18

## 2022-07-29 RX ADMIN — ENOXAPARIN SODIUM 120 MG: 120 INJECTION SUBCUTANEOUS at 17:19

## 2022-07-29 RX ADMIN — ATORVASTATIN CALCIUM 80 MG: 80 TABLET, FILM COATED ORAL at 20:41

## 2022-07-29 RX ADMIN — MEROPENEM 500 MG: 500 INJECTION, POWDER, FOR SOLUTION INTRAVENOUS at 17:17

## 2022-07-29 RX ADMIN — ZOLPIDEM TARTRATE 5 MG: 5 TABLET ORAL at 23:22

## 2022-07-29 RX ADMIN — MIDODRINE HYDROCHLORIDE 5 MG: 5 TABLET ORAL at 13:13

## 2022-07-29 ASSESSMENT — ENCOUNTER SYMPTOMS
NERVOUS/ANXIOUS: 0
SPEECH CHANGE: 0
FEVER: 0
VOMITING: 0
COUGH: 0
BACK PAIN: 0
STRIDOR: 0
SHORTNESS OF BREATH: 0
NAUSEA: 0
PALPITATIONS: 0
ABDOMINAL PAIN: 0
HEADACHES: 0

## 2022-07-29 ASSESSMENT — PAIN DESCRIPTION - PAIN TYPE
TYPE: ACUTE PAIN
TYPE: ACUTE PAIN
TYPE: CHRONIC PAIN

## 2022-07-29 ASSESSMENT — FIBROSIS 4 INDEX: FIB4 SCORE: 2.51

## 2022-07-29 NOTE — PROGRESS NOTES
Infectious Disease Progress Note    Author: Tania Briggs M.D. Date & Time of service: 2022  11:43 AM    Chief Complaint:  Gram neg septic shock    Interval History:  66 y.o. diabetic male known to ID service with a history of polycystic kidney disease leading to end-stage renal disease status post renal transplant in ,  recently hospitalized in early July for ESBL E. coli bacteremia secondary to urinary source admitted on 2022 AF WBC 11.4 off pressors and feeling much better Plan of care reviewed. Wound pictures reviewed   AF WBC 5.8 feels good-informed by hosp plan is now for home infusion    Labs Reviewed and Medications Reviewed.    Review of Systems:  Review of Systems   Constitutional: Negative for fever.   Respiratory: Negative for cough and shortness of breath.    Cardiovascular: Negative for chest pain.   Gastrointestinal: Negative for nausea and vomiting.   Genitourinary: Negative for dysuria.   All other systems reviewed and are negative.      Hemodynamics:  Temp (24hrs), Av °C (96.8 °F), Min:35.6 °C (96.1 °F), Max:36.6 °C (97.9 °F)  Temperature: 35.9 °C (96.6 °F)  Pulse  Av.1  Min: 46  Max: 134   Blood Pressure : 113/78       Physical Exam:  Physical Exam  Vitals and nursing note reviewed.   Constitutional:       General: He is not in acute distress.     Appearance: He is not ill-appearing, toxic-appearing or diaphoretic.   HENT:      Mouth/Throat:      Pharynx: No oropharyngeal exudate.   Eyes:      General: No scleral icterus.     Extraocular Movements: Extraocular movements intact.      Pupils: Pupils are equal, round, and reactive to light.   Cardiovascular:      Rate and Rhythm: Normal rate. Rhythm irregular.   Pulmonary:      Effort: Pulmonary effort is normal. No respiratory distress.      Breath sounds: No stridor.   Abdominal:      General: There is no distension.      Palpations: Abdomen is soft.      Tenderness: There is no abdominal tenderness.    Musculoskeletal:         General: Signs of injury present.      Cervical back: Neck supple. No rigidity.      Right lower leg: No edema.      Left lower leg: No edema.      Comments: RLE dressed  Left knee wound healing well  LUE midline placed   Skin:     Coloration: Skin is not jaundiced.      Findings: Bruising present.   Neurological:      General: No focal deficit present.      Mental Status: He is alert and oriented to person, place, and time.   Psychiatric:         Mood and Affect: Mood normal.         Behavior: Behavior normal.         Meds:    Current Facility-Administered Medications:   •  predniSONE  •  vitamin D2 (Ergocalciferol)  •  midodrine  •  meropenem  •  apixaban  •  atorvastatin  •  [Held by provider] carvedilol  •  gabapentin  •  mycophenolate  •  tacrolimus  •  acetaminophen  •  insulin regular **AND** POC blood glucose manual result **AND** NOTIFY MD and PharmD **AND** Administer 20 grams of glucose (approximately 8 ounces of fruit juice) every 15 minutes PRN FSBG less than 70 mg/dL **AND** dextrose bolus    Labs:  Recent Labs     07/27/22 0415 07/28/22 0312 07/29/22 0418   WBC 12.1* 11.4* 5.8   RBC 3.37* 3.19* 3.08*   HEMOGLOBIN 8.9* 8.4* 8.2*   HEMATOCRIT 29.1* 27.2* 27.0*   MCV 86.4 85.3 87.7   MCH 26.4* 26.3* 26.6*   RDW 54.6* 54.0* 55.8*   PLATELETCT 140* 153* 119*   MPV 11.5 12.2 11.3   NEUTSPOLYS 89.90* 83.20* 74.00*   LYMPHOCYTES 3.80* 7.30* 11.80*   MONOCYTES 5.50 8.40 10.60   EOSINOPHILS 0.00 0.10 1.90   BASOPHILS 0.20 0.30 0.50     Recent Labs     07/27/22 0415 07/28/22 0312 07/29/22 0418   SODIUM 133* 136 138   POTASSIUM 4.5 3.9 3.3*   CHLORIDE 101 106 109   CO2 21 22 18*   GLUCOSE 232* 166* 114*   BUN 30* 25* 19     Recent Labs     07/27/22 0415 07/28/22 0312 07/29/22 0418   ALBUMIN 2.6* 2.6* 1.8*   TBILIRUBIN 0.3 0.2 0.3   ALKPHOSPHAT 82 71 57   TOTPROTEIN 5.1* 4.7* 3.8*   ALTSGPT 12 10 8   ASTSGOT 12 9* 13   CREATININE 1.44* 1.70* 1.29        Imaging:  CT-ABDOMEN-PELVIS WITH    Result Date: 7/25/2022 7/25/2022 9:18 PM HISTORY/REASON FOR EXAM:  Sepsis. TECHNIQUE/EXAM DESCRIPTION:   CT scan of the abdomen and pelvis with contrast. Contrast-enhanced helical scanning was obtained from the diaphragmatic domes through the pubic symphysis following the bolus administration of nonionic contrast without complication. 95 mL of Omnipaque 350 nonionic contrast was administered without complication. Low dose optimization technique was utilized for this CT exam including automated exposure control and adjustment of the mA and/or kV according to patient size. COMPARISON: June 30, 2022 FINDINGS: Lower Chest: Linear densities the bilateral lung bases favor changes of atelectasis. Liver: Portal edema is seen. Spleen: Unremarkable. Pancreas: Unremarkable. Gallbladder: No calcified stones. Biliary: Nondilated. Adrenal glands: Normal. Kidneys: The right kidney is not visualized. There is transplant kidney seen in the right pelvis which appears unremarkable without visualized hydronephrosis. Native left kidney is visualized which appears enlarged with innumerable variable size renal cyst. Bowel: Fluid-filled prominence of small bowel is seen. The appendix appears within normal limits. Lymph nodes: No adenopathy. Vasculature: Atherosclerotic changes are seen including atherosclerotic coronary artery calcifications. 3.0 cm fusiform infrarenal abdominal aortic aneurysm is seen. Peritoneum: Unremarkable without ascites. Musculoskeletal: No acute or destructive process. Pelvis: No adenopathy or free fluid. Fat-containing left inguinal hernia is seen.     1.  Fluid-filled prominence of small bowel, consider ileus and/or enteritis. Radiographic follow-up to resolution recommended as clinically appropriate to exclude progression to obstructive changes. 2.  Periportal edema, nonspecific, can be associated with acute hepatitis in the appropriate clinical setting. 3.  Changes  of the left kidney most compatible with polycystic kidney disease. 4.  3.0 cm fusiform infrarenal abdominal aortic aneurysm, radiographic follow-up and surveillance recommended as clinically appropriate. 5.  Fat-containing left inguinal hernia 6.  Atherosclerosis and atherosclerotic coronary artery disease    CT-RENAL COLIC EVALUATION(A/P W/O)    Result Date: 6/30/2022 6/30/2022 11:05 PM HISTORY/REASON FOR EXAM:  Flank pain, kidney stone suspected; NAUSEA/VOMITING; PAINFUL URINATION. TECHNIQUE/EXAM DESCRIPTION: CT scan of the abdomen and pelvis without contrast. Noncontrast helical scanning was obtained from the diaphragmatic domes through the pubic symphysis. Low dose optimization technique was utilized for this CT exam including automated exposure control and adjustment of the mA and/or kV according to patient size. COMPARISON: May 27, 2022 FINDINGS: Lower Chest: Linear densities the bilateral lung bases favor changes of atelectasis. Liver: Normal. Spleen: Unremarkable. Pancreas: Unremarkable. Gallbladder: No calcified stones. Biliary: Nondilated. Adrenal glands: Normal. Kidneys: Right pelvic kidney is seen, appearance suggests transplant kidney. The right kidney is otherwise not visualized and is likely surgically absent. There is enlargement of the left kidney with innumerable cysts, appearance most compatible with polycystic kidney disease. No hydronephrosis of the transplant kidney or renal calculi are appreciated. Bowel: No obstruction or acute inflammation. Scattered colonic diverticula are seen. Lymph nodes: No adenopathy. Vasculature: Unremarkable. Peritoneum: Unremarkable without ascites. Musculoskeletal: No acute or destructive process. Pelvis: No adenopathy or free fluid. Fat-containing left inguinal hernia is seen.     1.  Changes compatible with polycystic kidney disease. 2.  Diverticulosis 3.  Fat-containing left inguinal hernia    DX-CHEST-FOR LINE PLACEMENT Perform procedure in: Patient's  Room    Result Date: 7/1/2022 7/1/2022 5:03 AM HISTORY/REASON FOR EXAM: Central line placement TECHNIQUE/EXAM DESCRIPTION:  PA and lateral views of the chest. COMPARISON: None FINDINGS: Right internal jugular central line is seen terminating at the right atriocaval junction.   The cardiac silhouette appears within normal limits. Atherosclerotic calcification of the aorta is noted.  The central pulmonary vasculature appears normal. Bilateral lung volumes are diminished.  Hazy interstitial bilateral pulmonary opacities are seen. No significant pleural effusions are identified. The bony structures appear age-appropriate.     1.  Interstitial edema and/or infiltrate. 2.  Atherosclerosis    DX-CHEST-PORTABLE (1 VIEW)    Result Date: 7/25/2022 7/25/2022 7:38 PM HISTORY/REASON FOR EXAM:  possible sepsis. TECHNIQUE/EXAM DESCRIPTION AND NUMBER OF VIEWS: Single AP view of the chest. COMPARISON: 7/1/2022 FINDINGS: Heart: The cardiac silhouette is stable in size. Mediastinum: Normal contours. Calcification in the aorta. Lungs: Prominent reticular markings are unchanged. No pneumothorax is identified. Pleura: No pleural fluid. Bones: No suspicious bony lesions. Lines/tubes: Right internal jugular catheter has been removed.     No significant interval change.    DX-CHEST-PORTABLE (1 VIEW)    Result Date: 6/30/2022 6/30/2022 10:44 PM HISTORY/REASON FOR EXAM: Possible sepsis. TECHNIQUE/EXAM DESCRIPTION:  Single AP view of the chest. COMPARISON: June 14, 2022 FINDINGS: The cardiac silhouette appears within normal limits. Atherosclerotic calcification of the aorta is noted.  The central pulmonary vasculature appears normal. The lungs appear well expanded bilaterally.  Patchy bilateral pulmonary opacities are seen. No significant pleural effusions are identified. The bony structures appear age-appropriate.     1.  Patchy bilateral pulmonary infiltrates or edema. 2.  Atherosclerosis    EC-ECHOCARDIOGRAM COMPLETE W/  CONT    Result Date: 7/2/2022  Transthoracic Echo Report Echocardiography Laboratory CONCLUSIONS Compared to the prior echo on 05/25/2022. Definite vegetations are not identified, failure to do so does not exclude their presence or the diagnosis of endocarditis, if clinically indicated, a transesophageal study would be useful. Mildly reduced left ventricular systolic function. The left ventricular ejection fraction is visually estimated to be 45%. Normal right ventricular size and systolic function. Aortic valve sclerosis without significant stenosis. No pericardial effusion. CIARA FORRESTER Exam Date:          LV EF:  45    % FINDINGS Left Ventricle Contrast was used to enhance visualization of the endocardial border. 3 mL of contrast was administered. Existing IV was used at the right arm. Normal left ventricular chamber size. Normal left ventricular wall thickness. Mildly reduced left ventricular systolic function. The left ventricular ejection fraction is visually estimated to be 45%. Global hypokinesis with regional variation. Grade I diastolic dysfunction. Right Ventricle Normal right ventricular size and systolic function. Right Atrium Normal right atrial size. The inferior vena cava is not well visualized. Left Atrium Normal left atrial size. Left atrial volume index is 31 mL/sq m. Mitral Valve Structurally normal mitral valve without significant stenosis. Trace mitral regurgitation. Aortic Valve Tricuspid aortic valve. Aortic valve sclerosis without significant stenosis. No aortic insufficiency. Tricuspid Valve Structurally normal tricuspid valve without significant stenosis. Trace tricuspid regurgitation. Right ventricular systolic pressure is estimated to be 28 mmHg. Pulmonic Valve Structurally normal pulmonic valve without significant stenosis. Trace pulmonic insufficiency. Pericardium No pericardial effusion. Aorta Normal aortic root for body surface area. The ascending aorta diameter is 3.3 cm. Mikel  Miguel MONIQUE (Electronically Signed) Final Date:     02 July 2022                 14:31    IR-MIDLINE CATHETER INSERTION WO GUIDANCE > AGE 4    Result Date: 7/5/2022  HISTORY/REASON FOR EXAM:  Midline Placement   TECHNIQUE/EXAM DESCRIPTION AND NUMBER OF VIEWS: Midline insertion with ultrasound guidance.  FINDINGS: Midline insertion with Ultrasound Guidance was performed by qualified nursing staff without the assistance of a Radiologist. Midline positioning as measured by RN or as appropriate length of catheter selected.              Ultrasound-guided midline placement performed by qualified nursing staff as above.       Micro:  Results     Procedure Component Value Units Date/Time    URINE CULTURE(NEW) [[id number]]  (Abnormal)  (Susceptibility) Collected: 07/25/22 2001    Order Status: Completed Specimen: Urine, Clean Catch Updated: 07/28/22 1059     Significant Indicator POS     Source UR     Site URINE, CLEAN CATCH     Culture Result -      Escherichia coli ESBL  >100,000 cfu/mL      Narrative:      Indication for culture:->Evaluation for sepsis without a  clear source of infection  Indication for culture:->Evaluation for sepsis without a    Susceptibility     Escherichia coli esbl (1)     Antibiotic Interpretation Microscan   Method Status    Ampicillin Resistant >16 mcg/mL CESIA Final    Ceftriaxone Resistant >32 mcg/mL CESIA Final    Cefazolin Resistant >16 mcg/mL CESIA Final     Breakpoints when Cefazolin is used for therapy of infections  other than uncomplicated UTIs due to Enterobacterales are as  follows:  CESIA and Interpretation:  <=2 S  4 I  >=8 R         Ciprofloxacin Sensitive <=0.25 mcg/mL CESIA Final    Cefepime Resistant >16 mcg/mL CESIA Final    Cefuroxime Resistant >16 mcg/mL CESIA Final    Ampicillin/sulbactam Intermediate 16/8 mcg/mL CESIA Final    Tobramycin Sensitive <=2 mcg/mL CESIA Final    Nitrofurantoin Sensitive <=32 mcg/mL CESIA Final    Gentamicin Sensitive <=2 mcg/mL CESIA Final    Levofloxacin Sensitive  "<=0.5 mcg/mL CESIA Final    Minocycline Sensitive <=4 mcg/mL CESIA Final    Pip/Tazobactam Sensitive <=8 mcg/mL CESIA Final    Trimeth/Sulfa Resistant >2/38 mcg/mL CESIA Final    Tigecycline Sensitive <=2 mcg/mL CESIA Final                   BLOOD CULTURE [[id number]]  (Abnormal) Collected: 07/25/22 1935    Order Status: Completed Specimen: Blood from Peripheral Updated: 07/28/22 0842     Significant Indicator POS     Source BLD     Site PERIPHERAL     Culture Result Growth detected by Bactec instrument. 07/26/2022  05:59      Escherichia coli ESBL  See previous culture for sensitivity report.      Narrative:      CALL  [place]  [facility] tel. [phone],  CALLED  [facility] tel. [phone] 07/26/2022, 06:00, RB PERF. RESULTS CALLED TO: RN  [phone]  Per Hospital Policy: Only change Specimen Src: to \"Line\" if  specified by physician order.  Right Forearm/Arm    BLOOD CULTURE [[id number]]  (Abnormal)  (Susceptibility) Collected: 07/25/22 1943    Order Status: Completed Specimen: Blood from Peripheral Updated: 07/28/22 0836     Significant Indicator POS     Source BLD     Site PERIPHERAL     Culture Result Growth detected by Bactec instrument. 07/26/2022  07:21      Escherichia coli ESBL    Narrative:      CALL  [place]  [facility] tel. [phone],  CALLED  [facility] tel. [phone] 07/26/2022, 07:25, RB PERF. RESULTS CALLED TO: RN  [phone]  Per Hospital Policy: Only change Specimen Src: to \"Line\" if  specified by physician order.  Left Forearm/Arm    Susceptibility     Escherichia coli esbl (1)     Antibiotic Interpretation Microscan   Method Status    Ampicillin Resistant >16 mcg/mL CESIA Final    Ceftriaxone Resistant >32 mcg/mL CESIA Final    Cefazolin Resistant >16 mcg/mL CESIA Final    Ciprofloxacin Sensitive <=0.25 mcg/mL CESIA Final    Cefepime Resistant >16 mcg/mL CESIA Final    Cefuroxime Resistant >16 mcg/mL CESIA Final    Ampicillin/sulbactam Intermediate 16/8 mcg/mL CESIA Final    Ertapenem Sensitive <=0.5 mcg/mL CESIA Final    Tobramycin Sensitive <=2 mcg/mL CESIA Final " "   Gentamicin Sensitive <=2 mcg/mL CESIA Final    Minocycline Sensitive <=4 mcg/mL CESIA Final    Moxifloxacin Sensitive <=2 mcg/mL CESIA Final    Pip/Tazobactam Sensitive <=8 mcg/mL CESIA Final    Trimeth/Sulfa Resistant >2/38 mcg/mL CESIA Final    Tigecycline Sensitive <=2 mcg/mL CESIA Final                   BLOOD CULTURE [[id number]] Collected: 07/26/22 1635    Order Status: Completed Specimen: Blood from Peripheral Updated: 07/27/22 0709     Significant Indicator NEG     Source BLD     Site PERIPHERAL     Culture Result No Growth  Note: Blood cultures are incubated for 5 days and  are monitored continuously.Positive blood cultures  are called to the RN and reported as soon as  they are identified.      Narrative:      Per Hospital Policy: Only change Specimen Src: to \"Line\" if  specified by physician order.  Right Forearm/Arm    BLOOD CULTURE [[id number]] Collected: 07/26/22 1630    Order Status: Completed Specimen: Blood from Peripheral Updated: 07/27/22 0709     Significant Indicator NEG     Source BLD     Site PERIPHERAL     Culture Result No Growth  Note: Blood cultures are incubated for 5 days and  are monitored continuously.Positive blood cultures  are called to the RN and reported as soon as  they are identified.      Narrative:      Per Hospital Policy: Only change Specimen Src: to \"Line\" if  specified by physician order.  Left Forearm/Arm    URINALYSIS [[id number]]  (Abnormal) Collected: 07/25/22 2001    Order Status: Completed Specimen: Urine, Clean Catch Updated: 07/25/22 2039     Color Yellow     Character Cloudy     Specific Gravity 1.018     Ph 5.0     Glucose Negative mg/dL      Ketones Trace mg/dL      Protein 100 mg/dL      Bilirubin Negative     Urobilinogen, Urine 0.2     Nitrite Negative     Leukocyte Esterase Moderate     Occult Blood Moderate     Micro Urine Req Microscopic    Narrative:      Indication for culture:->Evaluation for sepsis without a  clear source of infection    Urinalysis [[id number]] " Collected: 07/25/22 0000    Order Status: Canceled Specimen: Urine           Assessment:  Active Hospital Problems    Diagnosis    • *Sepsis(995.91) [A41.9]    • Secondary adrenal insufficiency ([facility]) [E27.49]    • Septic shock ([facility]) [A41.9, R65.21]    • Immunosuppressed status ([facility]) [D84.9]    • Urinary tract infection [N39.0]    • Wound of right lower extremity [S81.801A]    • PAF (paroxysmal atrial fibrillation) ([facility]) [I48.0]    • Diabetes mellitus with coincident hypertension ([facility]) [E11.9, I10]    • Thrombocytopenia ([facility]) [D69.6]    • GERD (gastroesophageal reflux disease) [K21.9]    • SANKET (acute kidney injury) ([facility]) [N17.9]    • Hyperlipidemia [E78.5]    • History of renal transplant [Z94.0]      Septic shock-ESBL Ecoli  UTI ESBL Ecoli  SANKET  Thrombocytopenia  Renal transplant on chronic immunosuppression  Diabetes mellitus   RLE wounds-significantly improved  Left knee nearly resolved     PLAN:   Shock now resolved  Afebrile  Mild leukocytosis resolved  SANKET resolved  Repeat cultures 7/26 neg to date- midline in place  Unclear why infection recurred-no nidus of infection seen in bladder (query wall infiltration by Ecoli), Kidney, or prostate (query subclinical prostatitis)  No known reflux. Does not have Epps, SP cath  Continue IV meropenem 500 mg every 6 hours while in the hospital  Can use once daily ertapenem as outpatient  Stop date IV antibiotics 8/8/2022 then follow with oral ciprofloxacin for 2 weeks as suspicion for prostatic involvement contributing to recurrence  EKG with   recommend Urology eval    Continue wound care  Keep BS under 150 to help control current infection    ABDULAZIZ ISIDRO AND Dr Cat  FU ID clinic after discharge first available  .

## 2022-07-29 NOTE — DISCHARGE PLANNING
ATTN: Case Management  RE: Referral for Home Health    As of 7/29/2022, we have accepted the Home Health referral for the patient listed above.    A [facility] clinician will be out to see the patient within 48 hours. If you have any questions or concerns regarding the patient's transition to Home Health, please do not hesitate to contact us at [phone].      We look forward to collaborating with you,  [facility] Health Team

## 2022-07-29 NOTE — PROGRESS NOTES
Hospital Medicine Daily Progress Note    Date of Service  7/29/2022    Chief Complaint  Difficulties urinating, chills and generalized weakness over the last several days    Hospital Course  Jama Altman is a 66 y.o. male with a history of diabetes, polycystic kidney disease and had a subsequent renal transplant in 2010 and has been on immunosuppressant.  Of significance he was recently admitted in early July of this year for extended spectrum beta-lactamase E. coli bacteremia secondary to urinary source and had been on meropenem.  The patient was admitted 7/25/2022 with recurrent sepsis from UTI.  He was found to have recurrent gram-negative bacteremia.  Infectious disease has been consulted and have him initiated on meropenem again.    Interval Problem Update  7/29: Midline IV access with difficulty flushing and checking US.  Discussed with case management and working on home health for home IV antibiotic infusion assist.  Patient alert and eager to be discharged once IV working and home health with IV antibiotics arranged. Patient states he is ambulating the halls multiple times.  Later in the day U/S showed cephalic DVT in left upper arm.  Alerted patient and he states he had a prior PICC line in that arm and had been taking his Eliquis.  Midline to be removed in left arm and we will start lovenox and warfarin.  Updated patient and his daughter.  Possible to use warfarin for 3 months then US left arm again and then if no further clot restart Elquis for his Afib given the likelyhood that prior picc line induced the current DVT.  I have updated home health, nursing, and .  Placement of a right sided PICC/Midline tomorrow.    7/28: Stopped IV steroids and placed back on home prednisone 5mg. If blood pressure remains stable will look to discontinue midodrine 7/29.  PICC/midline line ordered for home antibiotics until 8/8/22 per ID then oral ciprofloxacin x 2 weeks. Supplementing 2 gram IV MgSO4.  Patient getting up ambulating.  Slept better.      7/27: Hgb:8.9, WBC:12.1, Cr:1.44, BUN:30, Alb:2.6, A1c:6.2. 7/26 Blood cultures remain without growth.  7/25 BC GNR but no ID/Sen yet. Wean hydrocortisone and midodrine as BP increasing.       7/26: Patient is alert and awake.  Questions why he has recurrent infection despite recent treatment with antibiotics.  Patient states he is making urine.  He does have chronic wounds on his right lower extremity for which she has been seeing wound care he states that these wounds are improving.  Patient is being transferred to IM ICU stepdown as he is just off pressor support this morning and initiated on midodrine.  I discussed with nursing prior to the patient's transfer we are decreasing his IV fluids from 150 cc an hour to 100.     I have discussed this patient's plan of care and discharge plan at IDT rounds today with Case Management, Nursing, Nursing leadership, and other members of the IDT team.    Consultants/Specialty  infectious disease    Code Status  Full Code    Disposition  Patient is not medically cleared for discharge.   Anticipate discharge to to home with close outpatient follow-up.  I have placed the appropriate orders for post-discharge needs.    Review of Systems  Review of Systems   Constitutional: Negative for fever and malaise/fatigue.   Respiratory: Negative for cough, shortness of breath and stridor.    Cardiovascular: Negative for chest pain, palpitations and leg swelling.   Gastrointestinal: Negative for abdominal pain and nausea.   Genitourinary: Negative for dysuria and frequency.   Musculoskeletal: Negative for back pain and joint pain.   Neurological: Negative for speech change and headaches.   Psychiatric/Behavioral: The patient is not nervous/anxious.         Physical Exam  Temp:  [35.6 °C (96.1 °F)-35.9 °C (96.6 °F)] 35.9 °C (96.6 °F)  Pulse:  [107] 107  BP: (105-125)/(58-78) 105/58  SpO2:  [91 %-95 %] 91 %    Physical Exam  Vitals reviewed.    Constitutional:       Appearance: Normal appearance. He is not diaphoretic.   HENT:      Head: Normocephalic and atraumatic.      Nose: Nose normal.      Mouth/Throat:      Mouth: Mucous membranes are moist.      Pharynx: No oropharyngeal exudate.   Eyes:      General: No scleral icterus.        Right eye: No discharge.         Left eye: No discharge.      Extraocular Movements: Extraocular movements intact.      Conjunctiva/sclera: Conjunctivae normal.   Cardiovascular:      Rate and Rhythm: Normal rate and regular rhythm.      Pulses:           Radial pulses are 2+ on the right side and 2+ on the left side.        Dorsalis pedis pulses are 2+ on the right side and 2+ on the left side.      Heart sounds: No murmur heard.  Pulmonary:      Effort: Pulmonary effort is normal. No respiratory distress.      Breath sounds: Normal breath sounds. No wheezing or rales.   Abdominal:      General: Bowel sounds are normal. There is no distension.      Palpations: Abdomen is soft.   Musculoskeletal:         General: No swelling or tenderness.      Cervical back: Neck supple. No tenderness. No muscular tenderness.   Skin:     Coloration: Skin is not jaundiced or pale.      Comments: Right lower leg wound covered with clean gauze.  Left knee with healing abraision.   Neurological:      General: No focal deficit present.      Mental Status: He is alert and oriented to person, place, and time. Mental status is at baseline.      Cranial Nerves: No cranial nerve deficit.   Psychiatric:         Mood and Affect: Mood normal.         Behavior: Behavior normal.         Fluids    Intake/Output Summary (Last 24 hours) at 7/29/2022 1637  Last data filed at 7/29/2022 0600  Gross per 24 hour   Intake 400 ml   Output 1300 ml   Net -900 ml       Laboratory  Recent Labs     07/27/22  0415 07/28/22  0312 07/29/22  0418   WBC 12.1* 11.4* 5.8   RBC 3.37* 3.19* 3.08*   HEMOGLOBIN 8.9* 8.4* 8.2*   HEMATOCRIT 29.1* 27.2* 27.0*   MCV 86.4 85.3 87.7    MCH 26.4* 26.3* 26.6*   MCHC 30.6* 30.9* 30.4*   RDW 54.6* 54.0* 55.8*   PLATELETCT 140* 153* 119*   MPV 11.5 12.2 11.3     Recent Labs     07/27/22  0415 07/28/22  0312 07/29/22  0418   SODIUM 133* 136 138   POTASSIUM 4.5 3.9 3.3*   CHLORIDE 101 106 109   CO2 21 22 18*   GLUCOSE 232* 166* 114*   BUN 30* 25* 19   CREATININE 1.44* 1.70* 1.29   CALCIUM 8.1* 8.0* 7.0*                   Imaging  US-EXTREMITY VENOUS UPPER BILAT   Final Result      IR-MIDLINE CATHETER INSERTION WO GUIDANCE > AGE 3   Final Result                  Ultrasound-guided midline placement performed by qualified nursing staff    as above.          CT-ABDOMEN-PELVIS WITH   Final Result         1.  Fluid-filled prominence of small bowel, consider ileus and/or enteritis. Radiographic follow-up to resolution recommended as clinically appropriate to exclude progression to obstructive changes.   2.  Periportal edema, nonspecific, can be associated with acute hepatitis in the appropriate clinical setting.   3.  Changes of the left kidney most compatible with polycystic kidney disease.   4.  3.0 cm fusiform infrarenal abdominal aortic aneurysm, radiographic follow-up and surveillance recommended as clinically appropriate.   5.  Fat-containing left inguinal hernia   6.  Atherosclerosis and atherosclerotic coronary artery disease      DX-CHEST-PORTABLE (1 VIEW)   Final Result      No significant interval change.           Assessment/Plan  * Sepsis(995.91)  Assessment & Plan  ESBL EcoliGN bacteremia  Hx of ESBL Ecoli  Meropenem here  Once home Ertapenem daily until 8/8/22 via IV and then switch to oral ciprofloxacin per ID for two weeks  7/28 ordered midline catheter, 7/29 concern of obstruction on midline and U/S ordered. May need new picc.  S/p pressor support  Wean IV fluids  Midodrine weaning as able.  Monitor vitals, I/O's, labs  ID consulting    Acute deep vein thrombosis (DVT) of left upper extremity (HCC)  Assessment & Plan  On Eliquis and had a  prior PICC line as likely etiology of DVT  Doubt that new midline just placed 7/28 as cause of acute thrombosis.  Stop eliquis  Start enoxaparin and warfarin  Will need anticoagulation clinic and alerted case management    Secondary adrenal insufficiency ([facility])- (present on admission)  Assessment & Plan  7/28 stop hydrocortisone  Off midodrine  Monitor vitals.  Stop IV fluids.    Immunosuppressed status ([facility])- (present on admission)  Assessment & Plan  Due to Renal transplant and immunosuppressing meds.  Infectious disease consulting.    Septic shock ([facility])- (present on admission)  Assessment & Plan  S/p pressor support.  Monitor vitals  S/p IV fluids  antibiotics    Urinary tract infection- (present on admission)  Assessment & Plan  ESBL E. Coli on blood and urine cultures  Continue meropenem, renally adjust  If recurrent after this treatment he will need urology consultation  ID consulted  Cultures pending    Wound of right lower extremity- (present on admission)  Assessment & Plan  Wound care  Avoid lower extremity edema/swelling     PAF (paroxysmal atrial fibrillation) ([facility])- (present on admission)  Assessment & Plan  Eliquis     Advanced care planning/counseling discussion  Assessment & Plan  Goals of cares discussed with patient and family member at bedside.  Full code.    Diabetes mellitus with coincident hypertension ([facility])- (present on admission)  Assessment & Plan  Monitor accuchecks and cover with sliding scale insulin  Diabetic diet  Holding outpatient Januvia and glyburide    GERD (gastroesophageal reflux disease)- (present on admission)  Assessment & Plan  omeprazole    Thrombocytopenia ([facility])- (present on admission)  Assessment & Plan  7/27 Plt:153 < 140  Sepsis?   Monitor cbc    SANKET (acute kidney injury) ([facility])- (present on admission)  Assessment & Plan  Acute on chronic.    Fluids  Serial BMP showing improvement.  7/29 Cr:1.29, BUN:19  7/28  Cr:1.7, BUN:25  7/27 Cr:1.44, BUN:30  Stop IV fluids due to hx  of EF:45%  Monitor I/O's, labs.    Hyperlipidemia- (present on admission)  Assessment & Plan  Continue lipitor     History of renal transplant- (present on admission)  Assessment & Plan  Continue prograf and cellcept   home prednisone 5mg dose 7/28  Nephrology consulting.  Monitor labs.       VTE prophylaxis: therapeutic anticoagulation with apixaban    I have performed a physical exam and reviewed and updated ROS and Plan today (7/29/2022). In review of yesterday's note (7/28/2022), there are no changes except as documented above.

## 2022-07-29 NOTE — CARE PLAN
The patient is Watcher - Medium risk of patient condition declining or worsening    Shift Goals  Clinical Goals: Stable hemodynamics  Patient Goals: Discharge home  Family Goals: JUDITH    Progress made toward(s) clinical / shift goals:    Problem: Knowledge Deficit - Standard  Goal: Patient and family/care givers will demonstrate understanding of plan of care, disease process/condition, diagnostic tests and medications  Outcome: Progressing     Problem: Hemodynamics  Goal: Patient's hemodynamics, fluid balance and neurologic status will be stable or improve  Outcome: Progressing       Patient is not progressing towards the following goals:

## 2022-07-29 NOTE — PROGRESS NOTES
"[facility] -  PROGRESS NOTE               Author: Roderick Ramirez M.D. Date & Time: 7/29/2022  10:20 AM     HPI:  Patient is a 66 year old male with a PMHx of HTN, polycystic kidney disease with ESRD s/p renal transplant 2010 at [facility] on IS meds, HLD, DM, and recent ESBL e coli bacteremia here for rigors and fatigue.  Had dysuria with decreased UOP.  Was admitted to ICU and started on pressors for hypotension.  Started on IV abx.   Found to have GNR on blood cultures.  This morning off of pressors.  Feels better.  No pain or SOB.  Dysuria resolving.  Creatinine down to 1.8.      DAILY NEPHROLOGY SUMMARY:  7/27: Feeling better.  Denies pain or Sob.  Eating breakfast  7/28: Feeling well.  Creatinine 1.7, states that around his baseline.  Denies pain or SOB.  Walked yesterday.  7/29: Denies pain or SOB.  Feeling well.  Creatinine down to 1.2    REVIEW OF SYSTEMS:    10 point ROS reviewed and is as per HPI/daily summary or otherwise negative    PMH/PSH/SH/FH:   Reviewed and unchanged since admission note    CURRENT MEDICATIONS:   Reviewed from admission to present day    VS:  /78   Pulse 94   Temp (!) 35.6 °C (96.1 °F)   Resp 19   Ht 1.969 m (6' 5.5\")   Wt 111 kg (244 lb 11.4 oz)   SpO2 94%   BMI 28.65 kg/m²     Physical Exam  Constitutional:       Appearance: He is ill-appearing.   HENT:      Head: Normocephalic.      Right Ear: External ear normal.      Left Ear: External ear normal.      Nose: Nose normal.      Mouth/Throat:      Mouth: Mucous membranes are dry.   Eyes:      General:         Right eye: No discharge.         Left eye: No discharge.   Cardiovascular:      Pulses: Normal pulses.   Abdominal:      General: Abdomen is flat.      Palpations: Abdomen is soft.   Musculoskeletal:         General: No swelling.      Cervical back: Normal range of motion.   Skin:     General: Skin is warm.   Neurological:      General: No focal deficit present.      Mental Status: He is alert. "   Psychiatric:         Mood and Affect: Mood normal.   Fluids:  In: 1150 [P.O.:1150]  Out: 1300     LABS:  Recent Labs     07/27/22  0415 07/28/22  0312 07/29/22  0418   SODIUM 133* 136 138   POTASSIUM 4.5 3.9 3.3*   CHLORIDE 101 106 109   CO2 21 22 18*   GLUCOSE 232* 166* 114*   BUN 30* 25* 19   CREATININE 1.44* 1.70* 1.29   CALCIUM 8.1* 8.0* 7.0*       IMAGING:   All imaging reviewed from admission to present day    IMPRESSION:  # SANKET    - Likely multifactorial with sepsis and pre-renal etiologies initially improved but now worsening again    - Got CT with contrast 7/25  # Renal Transplant    - History of renal transplant 2010 at [facility]    - On IS meds, being continued  # Bacteremia    - On Meropenem    - Cultures pending  # Septic Shock  # UTI  # Right lower extremity wound  # PAF  # DM  # GERD  # Thrombocytopenia  # CKD-MBD    - Hypocalcemia - resolved    - Phos 2.8    - Check vitamin D 27 and PTH 44  # Anemia  # Hyponatremia    - Mild, monitor        PLAN:  - Continue home IS meds, home pred  - Abx per primary team  - Ergo qweek  - Dose all meds per eGFR     Okay to manage as outpatient from nephrology standpoint     Thank you for the consultation!

## 2022-07-29 NOTE — DISCHARGE PLANNING
Thank you for sending [facility] this referral.  It appears that this patient requires Home Infusion, as such we require the [facility] orders as well as information regarding the time of the last dose given, and the discharge date.  This referral will be placed on hold until receipt of the above mentioned information.

## 2022-07-29 NOTE — DISCHARGE PLANNING
Received choice for home health and home infusion @1045. Referrals sent to [facility] and [facility] per choice forms @1054.     @1145  Agency/Facility Name: [facility]   Spoke To: Tania   Outcome: Referral received. It is in benefits to check eligibility for pt. Will update [facility] when information and orders become available.     @8035  Agency/Facility Name: [facility]  Spoke To: Christal   Outcome: Christal to come see pt at bedside later this afternoon. Informed her that we just sent to order via fax to them as well. Stated that the pt needs a dose of the ertapenem before leaving the hospital in case a reaction occurs. DPA will remain in contact with Christal to arrange discharge as needed.

## 2022-07-29 NOTE — DISCHARGE PLANNING
Case Management Discharge Planning    Admission Date: 7/25/2022  GMLOS: 4.8  ALOS: 4    6-Clicks ADL Score: 23  6-Clicks Mobility Score: 23      Anticipated Discharge Dispo: Discharge Disposition: D/T to home under HHA care in anticipation of covered skilled care (06) and Home Infusion for Antibiotics.  [address]charge Address[address] [place] [address]  Discharge Contact Phone Number: [phone]    DME Needed: No    Action(s) Taken: Updated Provider/Nurse on Discharge Plan, Choice obtained, Referral(s) sent and OTHER: reviewed chart and updated discharge plan. Reviewed treatment and discharge plans, needs, and barriers with medical and nursing teams. RN DWAINE notified by Attending, Dr. Cat, that patient will need IVABTx at home on discharge. Choice for [facility]  services was already obtained for wound care and continuation of PT/OT on discharge. RN CM obtained choice for [facility] for the home infusion of antibiotics. Patient chose [facility]. Choice form faxed to DPA to send referral. RN CM awaiting final confirmation from ID on antibiotics and length of need.    Escalations Completed: DPA, Provider, Bedside RN and Speciality Provider ([facility])    Medically Clear: Yes, awaiting acceptance/teaching from [facility], and Acceptance of HH services.    Next Steps: f/u with medical and nursing teams regarding discharge planning needs/barriers and assist as indicated. f/u with patient and family regarding discharge planning needs/barriers and update discharge plan as indicated. F/u with DPA regarding referrals sent and assist as needed.    Barriers to Discharge: Outpatient Home Infusion/teaching pending    Is the patient up for discharge tomorrow: No, patient is set for discharge today once Home Infusion is set up and HH services have accepted.

## 2022-07-29 NOTE — FACE TO FACE
Face to Face Supporting Documentation - Home Health    The encounter with this patient was in whole or in part the primary reason for home health admission.    Date of encounter:   Patient:                    MRN:                       YOB: 2021  Jama Kevin Altman  [id number]  1955     Home health to see patient for:  Skilled Nursing care for assessment, interventions & education and Comment: need for home IV infusion of antibiotic daily and midline IV care through 8/8/22    Skilled need for:  New Onset Medical Diagnosis ESBL Ecoli bacteremia    Skilled nursing interventions to include:  Venous access care, Home IV infusion therapy and Wound Care    Homebound status evidenced by:  Needs the assistance of another person in order to leave the home. Leaving home requires a considerable and taxing effort. There is a normal inability to leave the home.    Community Physician to provide follow up care: Ganesh Benton III, M.D.     Optional Interventions? Yes, Details: Outpatient infusion      I certify the face to face encounter for this home health care referral meets the CMS requirements and the encounter/clinical assessment with the patient was, in whole, or in part, for the medical condition(s) listed above, which is the primary reason for home health care. Based on my clinical findings: the service(s) are medically necessary, support the need for home health care, and the homebound criteria are met.  I certify that this patient has had a face to face encounter by myself.  Jama Cat D.O. - NPI: [id number]

## 2022-07-29 NOTE — ASSESSMENT & PLAN NOTE
On Eliquis and had a prior PICC line as likely etiology of DVT  Doubt that new midline just placed 7/28 as cause of acute thrombosis.  Stop eliquis  Start enoxaparin and warfarin  Will need anticoagulation clinic and alerted case management

## 2022-07-29 NOTE — CARE PLAN
The patient is Stable - Low risk of patient condition declining or worsening    Shift Goals  Clinical Goals: Stable hemodynamics  Patient Goals: Discharge home  Family Goals: JUDITH    Progress made toward(s) clinical / shift goals:    Problem: Knowledge Deficit - Standard  Goal: Patient and family/care givers will demonstrate understanding of plan of care, disease process/condition, diagnostic tests and medications  Outcome: Progressing     Problem: Hemodynamics  Goal: Patient's hemodynamics, fluid balance and neurologic status will be stable or improve  Outcome: Progressing     Problem: Fluid Volume  Goal: Fluid volume balance will be maintained  Outcome: Progressing     Problem: Respiratory  Goal: Patient will achieve/maintain optimum respiratory ventilation and gas exchange  Outcome: Progressing       Patient is not progressing towards the following goals:

## 2022-07-30 ENCOUNTER — APPOINTMENT (OUTPATIENT)
Dept: RADIOLOGY | Facility: MEDICAL CENTER | Age: 67
DRG: 698 | End: 2022-07-30
Attending: STUDENT IN AN ORGANIZED HEALTH CARE EDUCATION/TRAINING PROGRAM
Payer: MEDICARE

## 2022-07-30 LAB
ANION GAP SERPL CALC-SCNC: 10 MMOL/L (ref 7–16)
BASOPHILS # BLD AUTO: 0.5 % (ref 0–1.8)
BASOPHILS # BLD: 0.03 K/UL (ref 0–0.12)
BUN SERPL-MCNC: 17 MG/DL (ref 8–22)
C DIFF DNA SPEC QL NAA+PROBE: NEGATIVE
C DIFF TOX GENS STL QL NAA+PROBE: NEGATIVE
CALCIUM SERPL-MCNC: 8.1 MG/DL (ref 8.5–10.5)
CHLORIDE SERPL-SCNC: 105 MMOL/L (ref 96–112)
CO2 SERPL-SCNC: 22 MMOL/L (ref 20–33)
CREAT SERPL-MCNC: 1.27 MG/DL (ref 0.5–1.4)
EOSINOPHIL # BLD AUTO: 0.11 K/UL (ref 0–0.51)
EOSINOPHIL NFR BLD: 2 % (ref 0–6.9)
ERYTHROCYTE [DISTWIDTH] IN BLOOD BY AUTOMATED COUNT: 53 FL (ref 35.9–50)
GFR SERPLBLD CREATININE-BSD FMLA CKD-EPI: 62 ML/MIN/1.73 M 2
GLUCOSE BLD STRIP.AUTO-MCNC: 104 MG/DL (ref 65–99)
GLUCOSE BLD STRIP.AUTO-MCNC: 150 MG/DL (ref 65–99)
GLUCOSE BLD STRIP.AUTO-MCNC: 178 MG/DL (ref 65–99)
GLUCOSE BLD STRIP.AUTO-MCNC: 225 MG/DL (ref 65–99)
GLUCOSE SERPL-MCNC: 126 MG/DL (ref 65–99)
HCT VFR BLD AUTO: 28.9 % (ref 42–52)
HGB BLD-MCNC: 9 G/DL (ref 14–18)
IMM GRANULOCYTES # BLD AUTO: 0.24 K/UL (ref 0–0.11)
IMM GRANULOCYTES NFR BLD AUTO: 4.3 % (ref 0–0.9)
INR PPP: 1.14 (ref 0.87–1.13)
LYMPHOCYTES # BLD AUTO: 0.83 K/UL (ref 1–4.8)
LYMPHOCYTES NFR BLD: 15 % (ref 22–41)
MCH RBC QN AUTO: 26.2 PG (ref 27–33)
MCHC RBC AUTO-ENTMCNC: 31.1 G/DL (ref 33.7–35.3)
MCV RBC AUTO: 84.3 FL (ref 81.4–97.8)
MONOCYTES # BLD AUTO: 0.79 K/UL (ref 0–0.85)
MONOCYTES NFR BLD AUTO: 14.3 % (ref 0–13.4)
NEUTROPHILS # BLD AUTO: 3.54 K/UL (ref 1.82–7.42)
NEUTROPHILS NFR BLD: 63.9 % (ref 44–72)
NRBC # BLD AUTO: 0 K/UL
NRBC BLD-RTO: 0 /100 WBC
PHOSPHATE SERPL-MCNC: 3.1 MG/DL (ref 2.5–4.5)
PLATELET # BLD AUTO: 153 K/UL (ref 164–446)
PMV BLD AUTO: 11.6 FL (ref 9–12.9)
POTASSIUM SERPL-SCNC: 3.6 MMOL/L (ref 3.6–5.5)
PROTHROMBIN TIME: 14.4 SEC (ref 12–14.6)
RBC # BLD AUTO: 3.43 M/UL (ref 4.7–6.1)
SODIUM SERPL-SCNC: 137 MMOL/L (ref 135–145)
WBC # BLD AUTO: 5.5 K/UL (ref 4.8–10.8)

## 2022-07-30 PROCEDURE — 700105 HCHG RX REV CODE 258: Performed by: INTERNAL MEDICINE

## 2022-07-30 PROCEDURE — A9270 NON-COVERED ITEM OR SERVICE: HCPCS | Performed by: STUDENT IN AN ORGANIZED HEALTH CARE EDUCATION/TRAINING PROGRAM

## 2022-07-30 PROCEDURE — 700111 HCHG RX REV CODE 636 W/ 250 OVERRIDE (IP): Performed by: STUDENT IN AN ORGANIZED HEALTH CARE EDUCATION/TRAINING PROGRAM

## 2022-07-30 PROCEDURE — 80048 BASIC METABOLIC PNL TOTAL CA: CPT

## 2022-07-30 PROCEDURE — 700111 HCHG RX REV CODE 636 W/ 250 OVERRIDE (IP): Performed by: INTERNAL MEDICINE

## 2022-07-30 PROCEDURE — 05HY33Z INSERTION OF INFUSION DEVICE INTO UPPER VEIN, PERCUTANEOUS APPROACH: ICD-10-PCS | Performed by: STUDENT IN AN ORGANIZED HEALTH CARE EDUCATION/TRAINING PROGRAM

## 2022-07-30 PROCEDURE — 700102 HCHG RX REV CODE 250 W/ 637 OVERRIDE(OP): Performed by: STUDENT IN AN ORGANIZED HEALTH CARE EDUCATION/TRAINING PROGRAM

## 2022-07-30 PROCEDURE — 99233 SBSQ HOSP IP/OBS HIGH 50: CPT | Performed by: STUDENT IN AN ORGANIZED HEALTH CARE EDUCATION/TRAINING PROGRAM

## 2022-07-30 PROCEDURE — 36415 COLL VENOUS BLD VENIPUNCTURE: CPT

## 2022-07-30 PROCEDURE — 82962 GLUCOSE BLOOD TEST: CPT | Mod: 91

## 2022-07-30 PROCEDURE — 87493 C DIFF AMPLIFIED PROBE: CPT

## 2022-07-30 PROCEDURE — 84100 ASSAY OF PHOSPHORUS: CPT

## 2022-07-30 PROCEDURE — 85610 PROTHROMBIN TIME: CPT

## 2022-07-30 PROCEDURE — 700111 HCHG RX REV CODE 636 W/ 250 OVERRIDE (IP): Performed by: HOSPITALIST

## 2022-07-30 PROCEDURE — 770001 HCHG ROOM/CARE - MED/SURG/GYN PRIV*

## 2022-07-30 PROCEDURE — C1751 CATH, INF, PER/CENT/MIDLINE: HCPCS

## 2022-07-30 PROCEDURE — 85025 COMPLETE CBC W/AUTO DIFF WBC: CPT

## 2022-07-30 PROCEDURE — 700102 HCHG RX REV CODE 250 W/ 637 OVERRIDE(OP): Performed by: HOSPITALIST

## 2022-07-30 RX ORDER — WARFARIN SODIUM 5 MG/1
5 TABLET ORAL DAILY
Status: DISCONTINUED | OUTPATIENT
Start: 2022-07-31 | End: 2022-08-01 | Stop reason: HOSPADM

## 2022-07-30 RX ORDER — WARFARIN SODIUM 10 MG/1
10 TABLET ORAL
Status: COMPLETED | OUTPATIENT
Start: 2022-07-30 | End: 2022-07-30

## 2022-07-30 RX ADMIN — PREDNISONE 5 MG: 10 TABLET ORAL at 07:01

## 2022-07-30 RX ADMIN — TACROLIMUS 1 MG: 1 CAPSULE ORAL at 07:01

## 2022-07-30 RX ADMIN — ENOXAPARIN SODIUM 120 MG: 120 INJECTION SUBCUTANEOUS at 07:02

## 2022-07-30 RX ADMIN — MEROPENEM 500 MG: 500 INJECTION, POWDER, FOR SOLUTION INTRAVENOUS at 07:01

## 2022-07-30 RX ADMIN — INSULIN HUMAN 3 UNITS: 100 INJECTION, SOLUTION PARENTERAL at 20:41

## 2022-07-30 RX ADMIN — ENOXAPARIN SODIUM 120 MG: 120 INJECTION SUBCUTANEOUS at 18:02

## 2022-07-30 RX ADMIN — MYCOPHENOLATE MOFETIL 500 MG: 250 CAPSULE ORAL at 07:01

## 2022-07-30 RX ADMIN — ATORVASTATIN CALCIUM 80 MG: 80 TABLET, FILM COATED ORAL at 20:42

## 2022-07-30 RX ADMIN — GABAPENTIN 600 MG: 300 CAPSULE ORAL at 20:42

## 2022-07-30 RX ADMIN — MYCOPHENOLATE MOFETIL 500 MG: 250 CAPSULE ORAL at 18:03

## 2022-07-30 RX ADMIN — INSULIN HUMAN 4 UNITS: 100 INJECTION, SOLUTION PARENTERAL at 13:36

## 2022-07-30 RX ADMIN — MEROPENEM 500 MG: 500 INJECTION, POWDER, FOR SOLUTION INTRAVENOUS at 13:35

## 2022-07-30 RX ADMIN — MEROPENEM 500 MG: 500 INJECTION, POWDER, FOR SOLUTION INTRAVENOUS at 23:12

## 2022-07-30 RX ADMIN — MEROPENEM 500 MG: 500 INJECTION, POWDER, FOR SOLUTION INTRAVENOUS at 18:02

## 2022-07-30 RX ADMIN — WARFARIN SODIUM 10 MG: 10 TABLET ORAL at 18:00

## 2022-07-30 RX ADMIN — TACROLIMUS 1 MG: 1 CAPSULE ORAL at 18:03

## 2022-07-30 ASSESSMENT — ENCOUNTER SYMPTOMS
NAUSEA: 0
MYALGIAS: 0
VOMITING: 0
BLURRED VISION: 0
SHORTNESS OF BREATH: 0
DIZZINESS: 0
BRUISES/BLEEDS EASILY: 0
FEVER: 0
COUGH: 0

## 2022-07-30 ASSESSMENT — COGNITIVE AND FUNCTIONAL STATUS - GENERAL
DRESSING REGULAR LOWER BODY CLOTHING: A LITTLE
MOVING FROM LYING ON BACK TO SITTING ON SIDE OF FLAT BED: A LITTLE
CLIMB 3 TO 5 STEPS WITH RAILING: A LITTLE
STANDING UP FROM CHAIR USING ARMS: A LITTLE
DAILY ACTIVITIY SCORE: 18
TOILETING: A LITTLE
PERSONAL GROOMING: A LITTLE
MOBILITY SCORE: 18
MOVING TO AND FROM BED TO CHAIR: A LITTLE
TURNING FROM BACK TO SIDE WHILE IN FLAT BAD: A LITTLE
SUGGESTED CMS G CODE MODIFIER DAILY ACTIVITY: CK
EATING MEALS: A LITTLE
WALKING IN HOSPITAL ROOM: A LITTLE
DRESSING REGULAR UPPER BODY CLOTHING: A LITTLE
SUGGESTED CMS G CODE MODIFIER MOBILITY: CK
HELP NEEDED FOR BATHING: A LITTLE

## 2022-07-30 ASSESSMENT — FIBROSIS 4 INDEX: FIB4 SCORE: 1.95

## 2022-07-30 NOTE — PROGRESS NOTES
Inpatient Anticoagulation Service Note    Date: 7/30/2022    Reason for Anticoagulation: Atrial Fibrillation, New Deep Vein Thrombosis   Target INR: 2.0 to 3.0  [id number] VASc Score: 5  HAS-BLED Score: 0   Hemoglobin Value: (!) 9  Hematocrit Value: (!) 28.9  Lab Platelet Value: (!) 153    INR from last 7 days     Date/Time INR Value    07/30/22 0619 1.14    07/29/22 1700 1.11    07/25/22 1943 1.38        Dose from last 7 days     Date/Time Dose (mg)    07/30/22 1458 10    07/29/22 1746 5        Average Dose (mg):  (new start)  Significant Interactions: Antibiotics, Corticosteroids, Statin  Bridge Therapy:  (Lovenox 120 mg BID)  Date of Last VTE Event: 07/29/22  Bridge Therapy Start Date: 07/29/22  Days of Overlap Therapy: 1   INR Value Greater than 2 Prior to Discontinuation of Parenteral Anticoagulation: Not Applicable     Reversal Agent Administered: Not Applicable    Comments: Warfarin started due to DVT forming while pt was on Eliquis. Today is day 1 of at least 5 days of overlap therapy with warfarin and Lovenox. Pt had warfarin 5 mg yesterday. Will give warfairn 10 mg today and then start warfarin 5 mg daily tomorrow. DDI with warfarin noted. Will trend the INR.    Education Material Provided?: No  Pharmacist suggested discharge dosing: TBD, warfarin 5 mg daily for now. Pt will need a follow up INR within 2-3 days of discharge     Darin Song, PharmD

## 2022-07-30 NOTE — PROCEDURES
Vascular Access Team    Date of Insertion: 7/30/22  Arm Circumference: 33  Internal length: 13  External Length: 0  Vein Occupancy %: 30  Reason for Midline: Abx  Labs: WBC 5.5, , BUN 17, Cr 1.27, GFR 62, INR 1.14    Orders confirmed, vessel patency confirmed with ultrasound. Risks and benefits of procedure explained to patient and education regarding line associated bloodstream infections provided. Questions answered.     Power Midline placed in RUE per licensed provider order with ultrasound guidance. 4 Fr, single lumen Power Midline placed in basilic vein after 1 attempt(s). 2 mL of 1% lidocaine injected intradermally, 21 gauge microintroducer needle was visualized entering the vein and modified Seldinger technique used. 1 cm catheter inserted with good blood return. Secured at 0 cm marker. Internal positioning stylet removed and verified to be intact. Each lumen flushed without resistance with 10 mL 0.9% normal saline. Midline secured with Biopatch and Tegaderm.     Midline placement is confirmed by nurse using ultrasound and ability to flush and draw blood. Midline is appropriate for use at this time.  Patient tolerated procedure well, without complications.  No X-ray is needed for placement confirmation.  Patient condition relayed to unit RN or ordering physician via this post procedure note in the EMR.     Ultrasound images uploaded to PACS and viewable in the EMR - yes  Ultrasound imaged printed and placed in paper chart - no     BARD Power Midline ref #[id number], Lot # [id number], Expiration Date 6/30/23

## 2022-07-30 NOTE — CARE PLAN
Problem: Knowledge Deficit - Standard  Goal: Patient and family/care givers will demonstrate understanding of plan of care, disease process/condition, diagnostic tests and medications  Outcome: Progressing     Problem: Pain - Standard  Goal: Alleviation of pain or a reduction in pain to the patient’s comfort goal  Outcome: Progressing   The patient is Stable - Low risk of patient condition declining or worsening    Shift Goals  Clinical Goals: safety  Patient Goals: sleep  Family Goals: JUDITH    Progress made toward(s) clinical / shift goals:  no falls    Patient is not progressing towards the following goals:

## 2022-07-30 NOTE — WOUND TEAM
Bedside RN informed wound team that pt was wanting to change wound care products to something more similar to what was being used at [facility].  Reviewed notes and pictures and discussed with pt.  Pt requesting a combination of primary dressings and we came to an agreement utilizing honey gel, mepitel ABD and rolled gauze.  Pt stated that collagen was in use at [facility], informed pt that the product we have been using is also a collagen but pt thinks this might be the source of pain at his wound site.  Although original POC utilized an excellent and appropriate dressing, accommodations were made based on pt preference. Dressings agreed upon by pt and wound care clinician are also appropriate for this pt's wounds so orders were modified for nursing to apply honey gel and pt's preferred secondary dressings.

## 2022-07-30 NOTE — PROGRESS NOTES
Hospital Medicine Daily Progress Note    Date of Service  7/30/2022    Chief Complaint  Jama Altman is a 66 y.o. male admitted 7/25/2022 with dysuria    Hospital Course  Jama Altman is a 66 y.o. male with a history of diabetes, polycystic kidney disease and had a subsequent renal transplant in 2010 and has been on immunosuppressant.  Of significance he was recently admitted in early July of this year for extended spectrum beta-lactamase E. coli bacteremia secondary to urinary source and had been on meropenem.  The patient was admitted 7/25/2022 with recurrent sepsis from UTI.  He was found to have recurrent gram-negative bacteremia.  Initially he required pressor support to maintain his blood pressure.  He was downgraded to IMCU on 7/7/26/2022 and back to regular floor on 7/30 2022. Infectious disease has been consulted and was initiated on meropenem again.ID recommended  Ertapenem  till 8/8/2021 followed by ciprofloxacin .  He was started on warfarin for left upper extremity DVT          Interval Problem Update  7/30/2022  Vitals remained stable.  Remain afebrile  Remain asymptomatic.  Reports of watery diarrhea 3 episode.  Will check for C. Difficile    Labs reviewed, H&H remained stable, corrected calcium level greater than 9  Previous blood culture growing ESBL E. coli  Repeated blood culture negative so far   INR 1.14 warfarin bridging with Lovenox  ID and nephrology following    Continue on IV antibiotics.  PICC line ordered      I have discussed this patient's plan of care and discharge plan at IDT rounds today with Case Management, Nursing, Nursing leadership, and other members of the IDT team.    Consultants/Specialty  critical care and infectious disease    Code Status  Full Code    Disposition  Patient is not medically cleared for discharge.   Anticipate discharge to to home with close outpatient follow-up.  I have placed the appropriate orders for post-discharge needs.    Review of  Systems  Review of Systems   Constitutional: Positive for malaise/fatigue. Negative for fever.   HENT: Negative for hearing loss.    Eyes: Negative for blurred vision.   Respiratory: Negative for cough and shortness of breath.    Cardiovascular: Negative for chest pain.   Gastrointestinal: Negative for nausea and vomiting.   Genitourinary: Negative for dysuria.   Musculoskeletal: Negative for myalgias.   Skin: Negative for rash.   Neurological: Negative for dizziness.   Endo/Heme/Allergies: Does not bruise/bleed easily.        Physical Exam  Temp:  [36 °C (96.8 °F)-36.8 °C (98.2 °F)] 36.8 °C (98.2 °F)  Pulse:  [] 83  Resp:  [18-20] 19  BP: (105-138)/() 138/92  SpO2:  [91 %-96 %] 92 %    Physical Exam  Constitutional:       General: He is not in acute distress.  HENT:      Head: Normocephalic and atraumatic.      Right Ear: Tympanic membrane normal.      Left Ear: Tympanic membrane normal.      Nose: Nose normal.      Mouth/Throat:      Mouth: Mucous membranes are moist.   Eyes:      Extraocular Movements: Extraocular movements intact.      Pupils: Pupils are equal, round, and reactive to light.   Cardiovascular:      Rate and Rhythm: Normal rate and regular rhythm.      Pulses: Normal pulses.   Pulmonary:      Effort: Pulmonary effort is normal. No respiratory distress.   Abdominal:      General: Abdomen is flat. There is no distension.   Musculoskeletal:      Cervical back: Normal range of motion.      Right lower leg: No edema.      Left lower leg: No edema.      Comments: Right lower leg wound covered with clean dressing   Skin:     General: Skin is warm.   Neurological:      General: No focal deficit present.      Mental Status: He is alert and oriented to person, place, and time. Mental status is at baseline.   Psychiatric:         Mood and Affect: Mood normal.         Fluids  No intake or output data in the 24 hours ending 07/30/22 1125    Laboratory  Recent Labs     07/28/22  0312 07/29/22  6563  07/30/22  0619   WBC 11.4* 5.8 5.5   RBC 3.19* 3.08* 3.43*   HEMOGLOBIN 8.4* 8.2* 9.0*   HEMATOCRIT 27.2* 27.0* 28.9*   MCV 85.3 87.7 84.3   MCH 26.3* 26.6* 26.2*   MCHC 30.9* 30.4* 31.1*   RDW 54.0* 55.8* 53.0*   PLATELETCT 153* 119* 153*   MPV 12.2 11.3 11.6     Recent Labs     07/28/22  0312 07/29/22  0418 07/30/22  0619   SODIUM 136 138 137   POTASSIUM 3.9 3.3* 3.6   CHLORIDE 106 109 105   CO2 22 18* 22   GLUCOSE 166* 114* 126*   BUN 25* 19 17   CREATININE 1.70* 1.29 1.27   CALCIUM 8.0* 7.0* 8.1*     Recent Labs     07/29/22  1700 07/30/22  0619   INR 1.11 1.14*               Imaging  US-EXTREMITY VENOUS UPPER BILAT   Final Result      IR-MIDLINE CATHETER INSERTION WO GUIDANCE > AGE 3   Final Result                  Ultrasound-guided midline placement performed by qualified nursing staff    as above.          CT-ABDOMEN-PELVIS WITH   Final Result         1.  Fluid-filled prominence of small bowel, consider ileus and/or enteritis. Radiographic follow-up to resolution recommended as clinically appropriate to exclude progression to obstructive changes.   2.  Periportal edema, nonspecific, can be associated with acute hepatitis in the appropriate clinical setting.   3.  Changes of the left kidney most compatible with polycystic kidney disease.   4.  3.0 cm fusiform infrarenal abdominal aortic aneurysm, radiographic follow-up and surveillance recommended as clinically appropriate.   5.  Fat-containing left inguinal hernia   6.  Atherosclerosis and atherosclerotic coronary artery disease      DX-CHEST-PORTABLE (1 VIEW)   Final Result      No significant interval change.      IR-PICC LINE PLACEMENT W/ GUIDANCE > AGE 5    (Results Pending)        Assessment/Plan  * Sepsis(995.91)  Assessment & Plan  ESBL EcoliGN bacteremia  Hx of ESBL Ecoli  Meropenem here  Once home Ertapenem daily until 8/8/22 via IV and then switch to oral ciprofloxacin per ID for two weeks  7/28 ordered midline catheter, 7/29 concern of obstruction  on midline and U/S ordered. May need new picc.  S/p pressor support  Wean IV fluids  Midodrine weaning as able.  Monitor vitals, I/O's, labs  ID consulting    Acute deep vein thrombosis (DVT) of left upper extremity (HCC)  Assessment & Plan  On Eliquis and had a prior PICC line as likely etiology of DVT  Doubt that new midline just placed 7/28 as cause of acute thrombosis.  Stop eliquis  Start enoxaparin and warfarin  Will need anticoagulation clinic and alerted case management    Secondary adrenal insufficiency (HCC)- (present on admission)  Assessment & Plan  7/28 stop hydrocortisone  Off midodrine  Monitor vitals.  Stop IV fluids.    Immunosuppressed status (HCC)- (present on admission)  Assessment & Plan  Due to Renal transplant and immunosuppressing meds.  Infectious disease consulting.    Septic shock (HCC)- (present on admission)  Assessment & Plan  S/p pressor support.  Monitor vitals  S/p IV fluids  antibiotics    Urinary tract infection- (present on admission)  Assessment & Plan  ESBL E. Coli on blood and urine cultures  Continue meropenem, renally adjust  If recurrent after this treatment he will need urology consultation  ID consulted  Cultures pending    Wound of right lower extremity- (present on admission)  Assessment & Plan  Wound care  Avoid lower extremity edema/swelling     PAF (paroxysmal atrial fibrillation) ([facility])- (present on admission)  Assessment & Plan  Warfarin     Advanced care planning/counseling discussion  Assessment & Plan  Goals of cares discussed with patient and family member at bedside.  Full code.    Diabetes mellitus with coincident hypertension (HCC)- (present on admission)  Assessment & Plan  Monitor accuchecks and cover with sliding scale insulin  Diabetic diet  Holding outpatient Januvia and glyburide    GERD (gastroesophageal reflux disease)- (present on admission)  Assessment & Plan  omeprazole    Thrombocytopenia ([facility])- (present on admission)  Assessment & Plan  7/27  Plt:153 < 140  Sepsis?   Monitor cbc    SANKET (acute kidney injury) (HCC)- (present on admission)  Assessment & Plan  Acute on chronic.    Fluids  Serial BMP showing improvement.  7/29 Cr:1.29, BUN:19  7/28  Cr:1.7, BUN:25  7/27 Cr:1.44, BUN:30  Stop IV fluids due to hx of EF:45%  Monitor I/O's, labs.    Hyperlipidemia- (present on admission)  Assessment & Plan  Continue lipitor     History of renal transplant- (present on admission)  Assessment & Plan  Continue prograf and cellcept   home prednisone 5mg dose 7/28  Nephrology consulting.  Monitor labs.       VTE prophylaxis: SCDs/TEDs and therapeutic anticoagulation with Warfarin bridging with Coumadin    I have performed a physical exam and reviewed and updated ROS and Plan today (7/30/2022). In review of yesterday's note (7/29/2022), there are no changes except as documented above.

## 2022-07-30 NOTE — PROGRESS NOTES
4 Eyes Skin Assessment Completed by LAN Walsh and LAN Ellison.    Head WDL  Ears WDL  Nose WDL  Mouth WDL  Neck WDL  Breast/Chest WDL  Shoulder Blades WDL  Spine WDL  (R) Arm/Elbow/Hand Bruising  (L) Arm/Elbow/Hand Bruising  Abdomen WDL  Groin WDL  Scrotum/Coccyx/Buttocks Redness Blanching  (R) Leg Edema Wound on anterior posterior and top of foot with dressing  (L) Leg Edema  (R) Heel/Foot/Toe callus  (L) Heel/Foot/Toe Edema callus          Devices In Places Blood Pressure Cuff      Interventions In Place Heels Loaded W/Pillows    Possible Skin Injury Yes    Pictures Uploaded Into Epic   Wound Consult Placed Previously ordered    RN Wound Prevention Protocol Ordered Wound care orders in place

## 2022-07-30 NOTE — PROGRESS NOTES
Received patient in room 616. Oriented X4. No distress noted. Call light within reach. Patient instructed to call before getting out of bed. Verbalized understanding.

## 2022-07-30 NOTE — PROGRESS NOTES
Assumed care of patient this shift. Patient is alert and oriented x 4. Able to make his needs known. Denied complaints of pain, only that he has been having loose stools. Notified MD, new orders received to test for c-diff.  Up independently in room and to bathroom. Plan of care discussed with patient and call light is within reach.

## 2022-07-30 NOTE — PROGRESS NOTES
Inpatient Anticoagulation Service Note    Date: 2022  Reason for Anticoagulation: Atrial Fibrillation, New Deep Vein Thrombosis   [id number] VASc Score: 5  HAS-BLED Score: 0    Hemoglobin Value: (!) 8.2  Hematocrit Value: (!) 27  Lab Platelet Value: (!) 119  Target INR: 2.0 to 3.0    INR from last 7 days     Date/Time INR Value    22 1700 1.11    22 1943 1.38        Dose from last 7 days     Date/Time Dose (mg)    22 1746 5        Significant Interactions: Antibiotics, Corticosteroids, Statin  Bridge Therapy: Yes  Date of Last VTE Event: 22  Bridge Therapy Start Date: 22  Days of Overlap Therapy: 0     Reversal Agent Administered: Not Applicable  Comments: Pt presenting with urosepsis on Eliquis at home for a hx of A-fib (Chadsvasc: 5). Today found to have a cephalic DVT of left upper extremity at PICC line site. Plan to switch to warfarin with lovenox bridge likely for 3 months with repeat imaging and possible transition back to Eliquis. Baseline INR today 1.1 with drug interactions noted above, with most significant being antibiotic therapy and steroid therapy. Will dose 5mg tonight. H/h/plt's slowly trending down over the last 3 days, plan to watch closely.    Plan:  5mg tonight  Education Material Provided?: No  Pharmacist suggested discharge dosinmg on Monday, Wednesday and Friday with 2.5mg all other days of the week.     Derik Soni, PharmD

## 2022-07-30 NOTE — PROGRESS NOTES
"[facility] -  PROGRESS NOTE               Author: Roderick Ramirez M.D. Date & Time: 7/30/2022  11:28 AM     HPI:  Patient is a 66 year old male with a PMHx of HTN, polycystic kidney disease with ESRD s/p renal [place] [facility] [place] meds, HLD, DM, and recent ESBL e coli bacteremia here for rigors and fatigue.  Had dysuria with decreased UOP.  Was admitted to ICU and started on pressors for hypotension.  Started on IV abx.   Found to have GNR on blood cultures.  This morning off of pressors.  Feels better.  No pain or SOB.  Dysuria resolving.  Creatinine down to 1.8.      DAILY NEPHROLOGY SUMMARY:  7/27: Feeling better.  Denies pain or Sob.  Eating breakfast  7/28: Feeling well.  Creatinine 1.7, states that around his baseline.  Denies pain or SOB.  Walked yesterday.  7/29: Denies pain or SOB.  Feeling well.  Creatinine down to 1.2  7/30: Picc line removed. Awaiting home delivery for abx.  Feels well otherwise    REVIEW OF SYSTEMS:    10 point ROS reviewed and is as per HPI/daily summary or otherwise negative    PMH/PSH/SH/FH:   Reviewed and unchanged since admission note    CURRENT MEDICATIONS:   Reviewed from admission to present day    VS:  BP (!) 138/92   Pulse 83   Temp 36.8 °C (98.2 °F) (Temporal)   Resp 19   Ht 1.969 m (6' 5.5\")   Wt 109 kg (240 lb 4.8 oz)   SpO2 92%   BMI 28.13 kg/m²     Physical Exam  Constitutional:       Appearance: He is ill-appearing.   HENT:      Head: Normocephalic.      Right Ear: External ear normal.      Left Ear: External ear normal.      Nose: Nose normal.      Mouth/Throat:      Mouth: Mucous membranes are dry.   Eyes:      General:         Right eye: No discharge.         Left eye: No discharge.   Cardiovascular:      Pulses: Normal pulses.   Abdominal:      General: Abdomen is flat.      Palpations: Abdomen is soft.   Musculoskeletal:         General: No swelling.      Cervical back: Normal range of motion.   Skin:     General: Skin is warm. "   Neurological:      General: No focal deficit present.      Mental Status: He is alert.   Psychiatric:         Mood and Affect: Mood normal.   Fluids:  No intake/output data recorded.    LABS:  Recent Labs     07/28/22  0312 07/29/22  0418 07/30/22  0619   SODIUM 136 138 137   POTASSIUM 3.9 3.3* 3.6   CHLORIDE 106 109 105   CO2 22 18* 22   GLUCOSE 166* 114* 126*   BUN 25* 19 17   CREATININE 1.70* 1.29 1.27   CALCIUM 8.0* 7.0* 8.1*       IMAGING:   All imaging reviewed from admission to present day    IMPRESSION:  # SANKET    - Likely multifactorial with sepsis and pre-renal etiologies initially improved but now worsening again    - Got CT with contrast 7/25  # Renal Transplant    - History of renal transplant 2010 at [facility] [place]    - On IS meds, being continued  # Bacteremia    - On Meropenem    - Cultures pending  # Septic Shock  # UTI  # Right lower extremity wound  # PAF  # DM  # GERD  # Thrombocytopenia  # CKD-MBD    - Hypocalcemia - resolved    - Phos 2.8    - Check vitamin D 27 and PTH 44  # Anemia  # Hyponatremia    - Mild, monitor        PLAN:  - Continue home IS meds, home pred  - Abx per primary team  - Ergo qweek  - Dose all meds per eGFR     Okay to manage as outpatient from nephrology standpoint     Thank you for the consultation!

## 2022-07-31 LAB
BACTERIA BLD CULT: NORMAL
BACTERIA BLD CULT: NORMAL
GLUCOSE BLD STRIP.AUTO-MCNC: 121 MG/DL (ref 65–99)
GLUCOSE BLD STRIP.AUTO-MCNC: 156 MG/DL (ref 65–99)
GLUCOSE BLD STRIP.AUTO-MCNC: 164 MG/DL (ref 65–99)
GLUCOSE BLD STRIP.AUTO-MCNC: 202 MG/DL (ref 65–99)
INR PPP: 1.29 (ref 0.87–1.13)
PROTHROMBIN TIME: 15.9 SEC (ref 12–14.6)
SIGNIFICANT IND 70042: NORMAL
SIGNIFICANT IND 70042: NORMAL
SITE SITE: NORMAL
SITE SITE: NORMAL
SOURCE SOURCE: NORMAL
SOURCE SOURCE: NORMAL

## 2022-07-31 PROCEDURE — 85610 PROTHROMBIN TIME: CPT

## 2022-07-31 PROCEDURE — 82962 GLUCOSE BLOOD TEST: CPT | Mod: 91

## 2022-07-31 PROCEDURE — 700111 HCHG RX REV CODE 636 W/ 250 OVERRIDE (IP): Performed by: STUDENT IN AN ORGANIZED HEALTH CARE EDUCATION/TRAINING PROGRAM

## 2022-07-31 PROCEDURE — A9270 NON-COVERED ITEM OR SERVICE: HCPCS | Performed by: STUDENT IN AN ORGANIZED HEALTH CARE EDUCATION/TRAINING PROGRAM

## 2022-07-31 PROCEDURE — 770001 HCHG ROOM/CARE - MED/SURG/GYN PRIV*

## 2022-07-31 PROCEDURE — 700102 HCHG RX REV CODE 250 W/ 637 OVERRIDE(OP): Performed by: STUDENT IN AN ORGANIZED HEALTH CARE EDUCATION/TRAINING PROGRAM

## 2022-07-31 PROCEDURE — 700105 HCHG RX REV CODE 258: Performed by: INTERNAL MEDICINE

## 2022-07-31 PROCEDURE — 700111 HCHG RX REV CODE 636 W/ 250 OVERRIDE (IP): Performed by: INTERNAL MEDICINE

## 2022-07-31 PROCEDURE — 700111 HCHG RX REV CODE 636 W/ 250 OVERRIDE (IP): Performed by: HOSPITALIST

## 2022-07-31 PROCEDURE — 99232 SBSQ HOSP IP/OBS MODERATE 35: CPT | Performed by: STUDENT IN AN ORGANIZED HEALTH CARE EDUCATION/TRAINING PROGRAM

## 2022-07-31 RX ORDER — ENOXAPARIN SODIUM 100 MG/ML
1 INJECTION SUBCUTANEOUS EVERY 12 HOURS
Status: DISCONTINUED | OUTPATIENT
Start: 2022-07-31 | End: 2022-08-01 | Stop reason: HOSPADM

## 2022-07-31 RX ADMIN — MEROPENEM 500 MG: 500 INJECTION, POWDER, FOR SOLUTION INTRAVENOUS at 18:11

## 2022-07-31 RX ADMIN — MEROPENEM 500 MG: 500 INJECTION, POWDER, FOR SOLUTION INTRAVENOUS at 05:34

## 2022-07-31 RX ADMIN — ENOXAPARIN SODIUM 100 MG: 100 INJECTION SUBCUTANEOUS at 18:11

## 2022-07-31 RX ADMIN — PREDNISONE 5 MG: 10 TABLET ORAL at 05:36

## 2022-07-31 RX ADMIN — INSULIN HUMAN 4 UNITS: 100 INJECTION, SOLUTION PARENTERAL at 13:10

## 2022-07-31 RX ADMIN — WARFARIN SODIUM 5 MG: 5 TABLET ORAL at 18:11

## 2022-07-31 RX ADMIN — CARVEDILOL 3.12 MG: 3.12 TABLET, FILM COATED ORAL at 18:11

## 2022-07-31 RX ADMIN — ATORVASTATIN CALCIUM 80 MG: 80 TABLET, FILM COATED ORAL at 20:24

## 2022-07-31 RX ADMIN — TACROLIMUS 1 MG: 1 CAPSULE ORAL at 05:36

## 2022-07-31 RX ADMIN — TACROLIMUS 1 MG: 1 CAPSULE ORAL at 18:11

## 2022-07-31 RX ADMIN — GABAPENTIN 600 MG: 300 CAPSULE ORAL at 20:24

## 2022-07-31 RX ADMIN — ENOXAPARIN SODIUM 120 MG: 120 INJECTION SUBCUTANEOUS at 05:37

## 2022-07-31 RX ADMIN — MEROPENEM 500 MG: 500 INJECTION, POWDER, FOR SOLUTION INTRAVENOUS at 13:10

## 2022-07-31 RX ADMIN — MYCOPHENOLATE MOFETIL 500 MG: 250 CAPSULE ORAL at 05:36

## 2022-07-31 RX ADMIN — MYCOPHENOLATE MOFETIL 500 MG: 250 CAPSULE ORAL at 18:11

## 2022-07-31 NOTE — PROGRESS NOTES
Assumed care of patient this shift. Patient is alert and oriented x 4. Able to make his needs known. Denied complaints of pain when asked.  Up independently in room and to bathroom. Plan of care discussed with patient and call light is within reach.

## 2022-07-31 NOTE — PROGRESS NOTES
Hospital Medicine Daily Progress Note    Date of Service  7/31/2022  Chief Complaint  Jama Altman is a 66 y.o. male admitted 7/25/2022 with dysuria     Hospital Course  Jama Altman is a 66 y.o. male with a history of diabetes, polycystic kidney disease and had a subsequent renal transplant in 2010 and has been on immunosuppressant.  Of significance he was recently admitted in early July of this year for extended spectrum beta-lactamase E. coli bacteremia secondary to urinary source and had been on meropenem.  The patient was admitted 7/25/2022 with recurrent sepsis from UTI.  He was found to have recurrent gram-negative bacteremia.  Initially he required pressor support to maintain his blood pressure.  He was downgraded to IMCU on 7/7/26/2022 and back to regular floor on 7/30 2022. Infectious disease has been consulted and was initiated on meropenem again. ID recommended  Ertapenem  till 8/8/2021 followed by ciprofloxacin  .  He was started on warfarin for left upper extremity DVT           Interval Problem Update  7/30/2022  Vitals remained stable.  Remain afebrile  Remain asymptomatic.  Reports of watery diarrhea 3 episode.  Will check for C. Difficile     Labs reviewed, H&H remained stable, corrected calcium level greater than 9  Previous blood culture growing ESBL E. coli  Repeated blood culture negative so far   INR 1.14 warfarin bridging with Lovenox  ID and nephrology following     Continue on IV antibiotics.  PICC line ordered     7/31/2022  Sinus tachycardia, SBP in 150s, remained afebrile  Remained asymptomatic.  No event overnight  Labs reviewed.  INR 1.29 on Lovenox to warfarin please  Blood culture negative so far.  Midline placed  Need to be on IV antibiotics till 8/8/2022.   assisting with outpatient antibiotic infusion set of    Continue IV antibiotics, check INR daily  Coumadin clinic referral on discharge     I have discussed this patient's plan of care and discharge plan at  IDT rounds today with Case Management, Nursing, Nursing leadership, and other members of the IDT team.     Consultants/Specialty  critical care and infectious disease     Code Status  Full Code     Disposition  Patient is not medically cleared for discharge.   Anticipate discharge to to home with close outpatient follow-up.  I have placed the appropriate orders for post-discharge needs.     Review of Systems  Review of Systems   Constitutional: Positive for malaise/fatigue. Negative for fever.   HENT: Negative for hearing loss.    Eyes: Negative for blurred vision.   Respiratory: Negative for cough and shortness of breath.    Cardiovascular: Negative for chest pain.   Gastrointestinal: Negative for nausea and vomiting.   Genitourinary: Negative for dysuria.   Musculoskeletal: Negative for myalgias.   Skin: Negative for rash.   Neurological: Negative for dizziness.   Endo/Heme/Allergies: Does not bruise/bleed easily.         Physical Exam  Temp:  [36 °C (96.8 °F)-36.8 °C (98.2 °F)] 36.8 °C (98.2 °F)  Pulse:  [] 83  Resp:  [18-20] 19  BP: (105-138)/() 138/92  SpO2:  [91 %-96 %] 92 %     Physical Exam  Constitutional:       General: He is not in acute distress.  HENT:      Head: Normocephalic and atraumatic.      Right Ear: Tympanic membrane normal.      Left Ear: Tympanic membrane normal.      Nose: Nose normal.      Mouth/Throat:      Mouth: Mucous membranes are moist.   Eyes:      Extraocular Movements: Extraocular movements intact.      Pupils: Pupils are equal, round, and reactive to light.   Cardiovascular:      Rate and Rhythm: Normal rate and regular rhythm.      Pulses: Normal pulses.   Pulmonary:      Effort: Pulmonary effort is normal. No respiratory distress.   Abdominal:      General: Abdomen is flat. There is no distension.   Musculoskeletal:      Cervical back: Normal range of motion.      Right lower leg: No edema.      Left lower leg: No edema.      Comments: Right lower leg wound covered  with clean dressing   Skin:     General: Skin is warm.   Neurological:      General: No focal deficit present.      Mental Status: He is alert and oriented to person, place, and time. Mental status is at baseline.   Psychiatric:         Mood and Affect: Mood normal.      Fluids    Intake/Output Summary (Last 24 hours) at 7/31/2022 1242  Last data filed at 7/30/2022 2000  Gross per 24 hour   Intake 250 ml   Output --   Net 250 ml       Laboratory  Recent Labs     07/29/22  0418 07/30/22  0619   WBC 5.8 5.5   RBC 3.08* 3.43*   HEMOGLOBIN 8.2* 9.0*   HEMATOCRIT 27.0* 28.9*   MCV 87.7 84.3   MCH 26.6* 26.2*   MCHC 30.4* 31.1*   RDW 55.8* 53.0*   PLATELETCT 119* 153*   MPV 11.3 11.6     Recent Labs     07/29/22  0418 07/30/22  0619   SODIUM 138 137   POTASSIUM 3.3* 3.6   CHLORIDE 109 105   CO2 18* 22   GLUCOSE 114* 126*   BUN 19 17   CREATININE 1.29 1.27   CALCIUM 7.0* 8.1*     Recent Labs     07/29/22  1700 07/30/22  0619 07/31/22  0250   INR 1.11 1.14* 1.29*               Imaging  IR-MIDLINE CATHETER INSERTION WO GUIDANCE > AGE 3   Final Result                  Ultrasound-guided midline placement performed by qualified nursing staff    as above.          US-EXTREMITY VENOUS UPPER BILAT   Final Result      IR-MIDLINE CATHETER INSERTION WO GUIDANCE > AGE 3   Final Result                  Ultrasound-guided midline placement performed by qualified nursing staff    as above.          CT-ABDOMEN-PELVIS WITH   Final Result         1.  Fluid-filled prominence of small bowel, consider ileus and/or enteritis. Radiographic follow-up to resolution recommended as clinically appropriate to exclude progression to obstructive changes.   2.  Periportal edema, nonspecific, can be associated with acute hepatitis in the appropriate clinical setting.   3.  Changes of the left kidney most compatible with polycystic kidney disease.   4.  3.0 cm fusiform infrarenal abdominal aortic aneurysm, radiographic follow-up and surveillance recommended  as clinically appropriate.   5.  Fat-containing left inguinal hernia   6.  Atherosclerosis and atherosclerotic coronary artery disease      DX-CHEST-PORTABLE (1 VIEW)   Final Result      No significant interval change.           Assessment/Plan  * Sepsis(995.91)  Assessment & Plan  ESBL EcoliGN bacteremia  Hx of ESBL Ecoli  Meropenem here  Once home Ertapenem daily until 8/8/22 via IV and then switch to oral ciprofloxacin per ID for two weeks  7/28 ordered midline catheter, 7/29 concern of obstruction on midline and U/S ordered. May need new picc.  S/p pressor support  Wean IV fluids  Midodrine weaning as able.  Monitor vitals, I/O's, labs  ID consulting    Acute deep vein thrombosis (DVT) of left upper extremity (HCC)  Assessment & Plan  On Eliquis and had a prior PICC line as likely etiology of DVT  Doubt that new midline just placed 7/28 as cause of acute thrombosis.  Stop eliquis  Start enoxaparin and warfarin  Will need anticoagulation clinic and alerted case management    Secondary adrenal insufficiency (HCC)- (present on admission)  Assessment & Plan  7/28 stop hydrocortisone  Off midodrine  Monitor vitals.  Stop IV fluids.    Immunosuppressed status (HCC)- (present on admission)  Assessment & Plan  Due to Renal transplant and immunosuppressing meds.  Infectious disease consulting.    Septic shock (HCC)- (present on admission)  Assessment & Plan  S/p pressor support.  Monitor vitals  S/p IV fluids  antibiotics    Urinary tract infection- (present on admission)  Assessment & Plan  ESBL E. Coli on blood and urine cultures  Continue meropenem, renally adjust  If recurrent after this treatment he will need urology consultation  ID consulted  Cultures pending    Wound of right lower extremity- (present on admission)  Assessment & Plan  Wound care  Avoid lower extremity edema/swelling     PAF (paroxysmal atrial fibrillation) (HCC)- (present on admission)  Assessment & Plan  Warfarin     Advanced care  planning/counseling discussion  Assessment & Plan  Goals of cares discussed with patient and family member at bedside.  Full code.    Diabetes mellitus with coincident hypertension (HCC)- (present on admission)  Assessment & Plan  Monitor accuchecks and cover with sliding scale insulin  Diabetic diet  Holding outpatient Januvia and glyburide    GERD (gastroesophageal reflux disease)- (present on admission)  Assessment & Plan  omeprazole    Thrombocytopenia (HCC)- (present on admission)  Assessment & Plan  7/27 Plt:153 < 140  Sepsis?   Monitor cbc    SANKET (acute kidney injury) (HCC)- (present on admission)  Assessment & Plan  Acute on chronic.    Fluids  Serial BMP showing improvement.  7/29 Cr:1.29, BUN:19  7/28  Cr:1.7, BUN:25  7/27 Cr:1.44, BUN:30  Stop IV fluids due to hx of EF:45%  Monitor I/O's, labs.    Hyperlipidemia- (present on admission)  Assessment & Plan  Continue lipitor     History of renal transplant- (present on admission)  Assessment & Plan  Continue prograf and cellcept   home prednisone 5mg dose 7/28  Nephrology consulting.  Monitor labs.       VTE prophylaxis: SCDs/TEDs and therapeutic anticoagulation with Lovenox bridge to warfarin    I have performed a physical exam and reviewed and updated ROS and Plan today (7/31/2022). In review of yesterday's note (7/30/2022), there are no changes except as documented above.

## 2022-07-31 NOTE — PROGRESS NOTES
Inpatient Anticoagulation Service Note    Date: 7/31/2022    Reason for Anticoagulation: Atrial Fibrillation, New Deep Vein Thrombosis   Target INR: 2.0 to 3.0  [id number] VASc Score: 5  HAS-BLED Score: 0   Hemoglobin Value: (!) 9  Hematocrit Value: (!) 28.9  Lab Platelet Value: (!) 153    INR from last 7 days     Date/Time INR Value    07/31/22 0250 1.29    07/30/22 0619 1.14    07/29/22 1700 1.11    07/25/22 1943 1.38        Dose from last 7 days     Date/Time Dose (mg)    07/31/22 1438 5    07/30/22 1458 10    07/29/22 1746 5        Average Dose (mg):  (new start)  Significant Interactions: Antibiotics, Corticosteroids, Statin    Bridge Therapy: Yes (enoxaparin 100 mg  SQ twice daily)  Date of Last VTE Event: 07/29/22  Bridge Therapy Start Date: 07/29/22  Days of Overlap Therapy: 2     INR Value Greater than 2 Prior to Discontinuation of Parenteral Anticoagulation: Not Applicable   Reversal Agent Administered: Not Applicable    Comments: home apixaban for a-fib transitioned to warfarin due to acute DVT while patient was on apixaban. INR trending up appropriately continue current warfarin dose. Today is day 2 of at least 5 days of overlap therapy with warfarin and enoxaparin. INR in AM.           Education Material Provided?: No    Pharmacist suggested discharge dosing: To be determined closer to discharge. Consider follow up INR in 48 to 72 hours after patient discharges from the hospital.            Анна Patel, PharmD

## 2022-07-31 NOTE — PROGRESS NOTES
"[facility] -  PROGRESS NOTE               Author: Dax León D.O. Date & Time: 7/31/2022  12:43 PM     HPI:  Patient is a 66 year old male with a PMHx of HTN, polycystic kidney disease with [place] [facility] [place], HLD, DM, and recent ESBL e coli bacteremia here for rigors and fatigue.  Had dysuria with decreased UOP.  Was admitted to ICU and started on pressors for hypotension.  Started on IV abx.   Found to have GNR on blood cultures.  This morning off of pressors.  Feels better.  No pain or SOB.  Dysuria resolving.  Creatinine down to 1.8.      DAILY NEPHROLOGY SUMMARY:   7/27: Feeling better.  Denies pain or Sob.  Eating breakfast   7/28: Feeling well.  Creatinine 1.7, states that around his baseline.  Denies pain or SOB.  Walked yesterday.   7/29: Denies pain or SOB.  Feeling well.  Creatinine down to 1.2   7/30: PICC line removed. Awaiting home delivery for abx.  Feels well otherwise   7/31: Feeling \"better\".  Eating full meals.  Getting stronger.  Getting IV ABX.     REVIEW OF SYSTEMS:    10 point ROS reviewed and is as per HPI/daily summary or otherwise negative    PMH/PSH/SH/FH:   Reviewed and unchanged since admission note    CURRENT MEDICATIONS:   Reviewed from admission to present day    VS:  BP (!) 151/98   Pulse 95   Temp 35.8 °C (96.5 °F) (Temporal)   Resp 18   Ht 1.969 m (6' 5.5\")   Wt 95.8 kg (211 lb 3.2 oz)   SpO2 95%   BMI 24.72 kg/m²     Physical Exam  Nursing note reviewed.   Constitutional:       Appearance: Normal appearance.   HENT:      Head: Normocephalic and atraumatic.      Nose: Nose normal.      Mouth/Throat:      Mouth: Mucous membranes are dry.   Eyes:      General: No scleral icterus.     Extraocular Movements: Extraocular movements intact.      Pupils: Pupils are equal, round, and reactive to light.   Cardiovascular:      Rate and Rhythm: Normal rate and regular rhythm.      Pulses: Normal pulses.      Heart sounds: " Normal heart sounds.   Pulmonary:      Effort: Pulmonary effort is normal.      Breath sounds: No wheezing or rales.   Abdominal:      General: Abdomen is flat. There is no distension.      Palpations: Abdomen is soft.      Tenderness: There is no abdominal tenderness.   Musculoskeletal:         General: No swelling or deformity.      Cervical back: Normal range of motion. No tenderness.   Skin:     General: Skin is warm and dry.      Findings: No rash.   Neurological:      General: No focal deficit present.      Mental Status: He is alert and oriented to person, place, and time.   Psychiatric:         Behavior: Behavior normal.       Fluids:  In: 250 [P.O.:250]  Out: -     LABS:  Recent Labs     07/29/22  0418 07/30/22  0619   SODIUM 138 137   POTASSIUM 3.3* 3.6   CHLORIDE 109 105   CO2 18* 22   GLUCOSE 114* 126*   BUN 19 17   CREATININE 1.29 1.27   CALCIUM 7.0* 8.1*       IMAGING:   All imaging reviewed from admission to present day    ASSESSMENTS:   # SANKET    Likely multifactorial with sepsis and pre-renal etiologies initially improved but now worsening again    Got CT with contrast 7/25   # Renal Transplant     History of renal transplant 2010 at [facility] [place]     On IS meds, being continued   # Bacteremia     On Meropenem     Cultures pending   # Septic Shock   # UTI   # Right lower extremity wound   # PAF   # DM   # GERD   # Thrombocytopenia   # CKD-MBD    Hypocalcemia - resolved    Phos 2.8    Check vitamin D 27 and PTH 44   # Anemia   # Hyponatremia     Mild, monitor        SUGGESTIONS:   No change to renal recommendations   Continue home IS meds, home pred   Abx per primary team   Ergo qweek   Dose all meds per eGFR     Okay to manage as outpatient from nephrology standpoint  Has follow up appt with us in 1-2 weeks    Thank you,

## 2022-07-31 NOTE — CARE PLAN
The patient is Stable - Low risk of patient condition declining or worsening    Shift Goals  Clinical Goals: comfort  Patient Goals: pt will be able to rest and sleep comfortably  Family Goals: JUDITH    Progress made toward(s) clinical / shift goals:      Patient is not progressing towards the following goals:

## 2022-08-01 ENCOUNTER — PHARMACY VISIT (OUTPATIENT)
Dept: PHARMACY | Facility: MEDICAL CENTER | Age: 67
End: 2022-08-01
Payer: COMMERCIAL

## 2022-08-01 VITALS
WEIGHT: 211.2 LBS | DIASTOLIC BLOOD PRESSURE: 94 MMHG | HEART RATE: 70 BPM | HEIGHT: 78 IN | RESPIRATION RATE: 16 BRPM | OXYGEN SATURATION: 96 % | BODY MASS INDEX: 24.44 KG/M2 | SYSTOLIC BLOOD PRESSURE: 136 MMHG | TEMPERATURE: 96.8 F

## 2022-08-01 DIAGNOSIS — Z86.79 HISTORY OF ATRIAL FIBRILLATION: ICD-10-CM

## 2022-08-01 LAB
EKG IMPRESSION: NORMAL
GLUCOSE BLD STRIP.AUTO-MCNC: 180 MG/DL (ref 65–99)
INR PPP: 1.42 (ref 0.87–1.13)
PROTHROMBIN TIME: 17.1 SEC (ref 12–14.6)

## 2022-08-01 PROCEDURE — 80197 ASSAY OF TACROLIMUS: CPT

## 2022-08-01 PROCEDURE — 700111 HCHG RX REV CODE 636 W/ 250 OVERRIDE (IP): Performed by: STUDENT IN AN ORGANIZED HEALTH CARE EDUCATION/TRAINING PROGRAM

## 2022-08-01 PROCEDURE — 700111 HCHG RX REV CODE 636 W/ 250 OVERRIDE (IP): Performed by: HOSPITALIST

## 2022-08-01 PROCEDURE — 700105 HCHG RX REV CODE 258: Performed by: INTERNAL MEDICINE

## 2022-08-01 PROCEDURE — RXMED WILLOW AMBULATORY MEDICATION CHARGE: Performed by: STUDENT IN AN ORGANIZED HEALTH CARE EDUCATION/TRAINING PROGRAM

## 2022-08-01 PROCEDURE — 82962 GLUCOSE BLOOD TEST: CPT

## 2022-08-01 PROCEDURE — 85610 PROTHROMBIN TIME: CPT

## 2022-08-01 PROCEDURE — 700111 HCHG RX REV CODE 636 W/ 250 OVERRIDE (IP): Performed by: INTERNAL MEDICINE

## 2022-08-01 PROCEDURE — 93005 ELECTROCARDIOGRAM TRACING: CPT | Performed by: STUDENT IN AN ORGANIZED HEALTH CARE EDUCATION/TRAINING PROGRAM

## 2022-08-01 PROCEDURE — 99239 HOSP IP/OBS DSCHRG MGMT >30: CPT | Performed by: STUDENT IN AN ORGANIZED HEALTH CARE EDUCATION/TRAINING PROGRAM

## 2022-08-01 PROCEDURE — 93010 ELECTROCARDIOGRAM REPORT: CPT | Performed by: INTERNAL MEDICINE

## 2022-08-01 RX ORDER — WARFARIN SODIUM 5 MG/1
5 TABLET ORAL DAILY
Qty: 5 TABLET | Refills: 0 | Status: SHIPPED | OUTPATIENT
Start: 2022-08-01 | End: 2022-08-03 | Stop reason: SDUPTHER

## 2022-08-01 RX ORDER — ERGOCALCIFEROL 1.25 MG/1
50000 CAPSULE ORAL
Qty: 2 CAPSULE | Refills: 0 | Status: SHIPPED | OUTPATIENT
Start: 2022-08-03 | End: 2022-08-17

## 2022-08-01 RX ORDER — CIPROFLOXACIN 500 MG/1
500 TABLET, FILM COATED ORAL 2 TIMES DAILY
Qty: 28 TABLET | Refills: 0 | Status: SHIPPED | OUTPATIENT
Start: 2022-08-09 | End: 2022-08-23

## 2022-08-01 RX ORDER — ENOXAPARIN SODIUM 100 MG/ML
1 INJECTION SUBCUTANEOUS EVERY 12 HOURS
Qty: 10 EACH | Refills: 0 | Status: SHIPPED | OUTPATIENT
Start: 2022-08-01 | End: 2022-08-03

## 2022-08-01 RX ADMIN — MEROPENEM 500 MG: 500 INJECTION, POWDER, FOR SOLUTION INTRAVENOUS at 06:08

## 2022-08-01 RX ADMIN — PREDNISONE 5 MG: 10 TABLET ORAL at 06:08

## 2022-08-01 RX ADMIN — MEROPENEM 500 MG: 500 INJECTION, POWDER, FOR SOLUTION INTRAVENOUS at 00:03

## 2022-08-01 RX ADMIN — ENOXAPARIN SODIUM 100 MG: 100 INJECTION SUBCUTANEOUS at 06:07

## 2022-08-01 RX ADMIN — TACROLIMUS 1 MG: 1 CAPSULE ORAL at 06:08

## 2022-08-01 RX ADMIN — MYCOPHENOLATE MOFETIL 500 MG: 250 CAPSULE ORAL at 06:08

## 2022-08-01 NOTE — PROGRESS NOTES
Assumed care of patient this shift. Patient is alert and oriented x 4. Able to make his needs known. Denied complaints of pain when asked.  Up independently in room and to bathroom. Plan of care discussed with patient and call light is within reach, patient anxiously awaiting being discharged to home this morning.

## 2022-08-01 NOTE — PROGRESS NOTES
Pt being dc'd to home  with [facility]. Dc instructions discussed with patient in discharge lounge. All questions answered.  Patient agreeable to dc plan.  Bedside RN to confirm that patient has all belongings at dc and that all home care needs have been arranged.

## 2022-08-01 NOTE — DISCHARGE PLANNING
Case Management Discharge Planning    Admission Date: 7/25/2022  GMLOS: 4.8  ALOS: 7    6-Clicks ADL Score: 18  6-Clicks Mobility Score: 18      Anticipated Discharge Dispo: Discharge Disposition: D/T to home under [facility] care in anticipation of covered skilled care (06)  Discharge Address: [address] [place] [address]  Discharge Contact Phone Number: [phone]    DME Needed: No    Action(s) Taken:    0900: Per MD, patient to discharge to home today. Patient has been accepted by [facility]  and is pending acceptance with [facility] for ABX. RN CM asked DPA to follow up.     0915: RN CM informed by MD that patient will need an appt at the coumadin clinic. Appt to be made.     1000: RN CM attempting to call coumadin clinic but line continues to disconnect. RN CM will continue trying. DPA called [facility]. [facility] has not been able to get a hold of patient. Bedside RN given the number to [facility] to have the patient call.     1034: DPA called [facility]. They are set to take patient. RN CM still attempting to make coumadin clinic appt.     1133: RN CM called the Coumadin clinic office and spoke to . Appt made for Wednesday, 8/3/22, at 0800. Bedside RN notified.     No further discharge needs.     Escalations Completed: None    Medically Clear: Yes    Next Steps: none     Barriers to Discharge: none     Is the patient up for discharge tomorrow: No, today

## 2022-08-01 NOTE — DISCHARGE INSTRUCTIONS
Discharge Instructions    Discharged to home by car with relative. Discharged via wheelchair, hospital escort: Yes.  Special equipment needed: Not Applicable    Be sure to schedule a follow-up appointment with your primary care doctor or any specialists as instructed.     Discharge Plan:        I understand that a diet low in cholesterol, fat, and sodium is recommended for good health. Unless I have been given specific instructions below for another diet, I accept this instruction as my diet prescription.   Other diet: regular    Special Instructions: Sepsis, Diagnosis, Adult  Sepsis is a serious bodily reaction to an infection. The infection that triggers sepsis may be from a bacteria, virus, or fungus. Sepsis can result from an infection in any part of your body. Infections that commonly lead to sepsis include skin, lung, and urinary tract infections.  Sepsis is a medical emergency that must be treated right away in a hospital. In severe cases, it can lead to septic shock. Septic shock can weaken your heart and cause your blood pressure to drop. This can cause your central nervous system and your body's organs to stop working.  What are the causes?  This condition is caused by a severe reaction to infections from bacteria, viruses, or fungus. The germs that most often lead to sepsis include:  Escherichia coli (E. coli) bacteria.  Staphylococcus aureus (staph) bacteria.  Some types of Streptococcus bacteria.  The most common infections affect these organs:  The lung (pneumonia).  The kidneys or bladder (urinary tract infection).  The skin (cellulitis).  The bowel, gallbladder, or pancreas.  What increases the risk?  You are more likely to develop this condition if:  Your body's disease-fighting system (immune system) is weakened.  You are age 65 or older.  You are male.  You had surgery or you have been hospitalized.  You have these devices inserted into your body:  A small, thin tube (catheter).  IV  line.  Breathing tube.  Drainage tube.  You are not getting enough nutrients from food (malnourished).  You have a long-term (chronic) disease, such as cancer, lung disease, kidney disease, or diabetes.  You are .  What are the signs or symptoms?  Symptoms of this condition may include:  Fever.  Chills or feeling very cold.  Confusion or anxiety.  Fatigue.  Muscle aches.  Shortness of breath.  Nausea and vomiting.  Urinating much less than usual.  Fast heart rate (tachycardia).  Rapid breathing (hyperventilation).  Changes in skin color. Your skin may look blotchy, pale, or blue.  Cool, clammy, or sweaty skin.  Skin rash.  Other symptoms depend on the source of your infection.  How is this diagnosed?  This condition is diagnosed based on:  Your symptoms.  Your medical history.  A physical exam.  Other tests may also be done to find out the cause of the infection and how severe the sepsis is. These tests may include:  Blood tests.  Urine tests.  Swabs from other areas of your body that may have an infection. These samples may be tested (cultured) to find out what type of bacteria is causing the infection.  Chest X-ray to check for pneumonia. Other imaging tests, such as a CT scan, may also be done.  Lumbar puncture. This removes a small amount of the fluid that surrounds your brain and spinal cord. The fluid is then examined for infection.  How is this treated?  This condition must be treated in a hospital. Based on the cause of your infection, you may be given an antibiotic, antiviral, or antifungal medicine.  You may also receive:  Fluids through an IV.  Oxygen and breathing assistance.  Medicines to increase your blood pressure.  Kidney dialysis. This process cleans your blood if your kidneys have failed.  Surgery to remove infected tissue.  Blood transfusion if needed.  Medicine to prevent blood clots.  Nutrients to correct imbalances in basic body function (metabolism). You may:  Receive  important salts and minerals (electrolytes) through an IV.  Have your blood sugar level adjusted.  Follow these instructions at home:  Medicines    Take over-the-counter and prescription medicines only as told by your health care provider.  If you were prescribed an antibiotic, antiviral, or antifungal medicine, take it as told by your health care provider. Do not stop taking the medicine even if you start to feel better.  General instructions  If you have a catheter or other indwelling device, ask to have it removed as soon as possible.  Keep all follow-up visits as told by your health care provider. This is important.  Contact a health care provider if:  You do not feel like you are getting better or regaining strength.  You are having trouble coping with your recovery.  You frequently feel tired.  You feel worse or do not seem to get better after surgery.  You think you may have an infection after surgery.  Get help right away if:  You have any symptoms of sepsis.  You have difficulty breathing.  You have a rapid or skipping heartbeat.  You become confused or disoriented.  You have a high fever.  Your skin becomes blotchy, pale, or blue.  You have an infection that is getting worse or not getting better.  These symptoms may represent a serious problem that is an emergency. Do not wait to see if the symptoms will go away. Get medical help right away. Call your local emergency services (911 in the [place] Do not drive yourself to the hospital.  Summary  Sepsis is a medical emergency that requires immediate treatment in a hospital.  This condition is caused by a severe reaction to infections from bacteria, viruses, or fungus.  Based on the cause of your infection, you may be given an antibiotic, antiviral, or antifungal medicine.  Treatment may also include IV fluids, breathing assistance, and kidney dialysis.  This information is not intended to replace advice given to you by your health care provider. Make sure you  discuss any questions you have with your health care provider.  Document Released: 09/15/2004 Document Revised: 07/26/2019 Document Reviewed: 07/26/2019  [employer] Patient Education © 2020 [employer] and   Deep Vein Thrombosis Discharge Instructions    A deep vein thrombosis (DVT) is a blood clot (thrombus) that develops in a deep vein. A DVT is a clot in the deep, larger veins of the leg, arm, or pelvis. These are more dangerous than clots that might form in veins on the surface of the body. Deep vein thrombosis can lead to complications if the clot breaks off and travels in the bloodstream to the lungs.     CAUSES  Blood clots form in a vein for different reasons. Usually several things cause blood clots. They include:   The flow of blood slows down.   The inside of the vein is damaged in some way.   The person has a condition that makes blood clot more easily. These conditions may include:  Older age (especially over 75 years old).  Having a history of blood clots.  Having major or lengthy surgery. Hip surgery is particularly high-risk.   Breaking a hip or leg.  Sitting or lying still for a long time.  Cancer or cancer treatment.  Having a long, thin tube (catheter) placed inside a vein during a medical procedure.   Being overweight (obese).  Pregnancy and childbirth.  Medicines with estrogen.  Smoking.  Other circulation or heart problems.     SYMPTOMS  When a clot forms, it can either partially or totally block the blood flow in that vein. Symptoms of a DVT can include:  Swelling of the leg or arm, especially if one side is much worse.  Warmth and redness of the leg or arm, especially if one side is much worse.   Pain in an arm or leg. If the clot is in the leg, symptoms may be more noticeable or worse when standing or walking.  If the blood clot travels to the lung, it may cause:  Shortness of breath.  Chest pain. The pain may be worsened by deep breaths.   Coughing up thick mucus (phlegm), possibly flecked  with blood.   Anyone with these symptoms should get emergency medical treatment right away. Call your local emergency  Services (911 in [place] if you have these symptoms.     DIAGNOSIS  If a DVT is suspected, your caregiver will take a full medical history. He or she will also perform a physical exam. Tests that also may be required include:   Studies of the clotting properties of the blood.   An ultrasound scan.   X-rays to show the flow of blood when special dye is injected into the veins (venography).   Studies of your lungs if you have any chest symptoms.     PREVENTION  Exercise the legs regularly. Take a brisk 30 minute walk every day.   Maintain a weight that is appropriate for your height.  Avoid sitting or lying in bed for long periods of time without moving your legs.   Women, particularly those over the age of 35, should consider the risks and benefits of taking estrogen medicine, including birth control pills.   Do not smoke, especially if you take estrogen medicines.   Long-distance travel can increase your risk. You should exercise your legs by walking or pumping the muscles every hour.   In hospital prevention: Prevention may include medical and non medical measures.     TREATMENT  The most common treatment for DVT is blood thinning (anticoagulant) medicine, which reduces the blood's tendency to clot. Anticoagulants can stop new blood clots from forming and old ones from growing. They cannot dissolve existing clots. Your body does this by itself over time. Anticoagulants can be given by mouth, by intravenous (IV) access, or by injection. Your caregiver will determine the best program for you.   Less commonly, clot-dissolving drugs (thrombolytics) are used to dissolve a DVT. They carry a high risk of bleeding, so they are used mainly in severe cases.   Very rarely, a blood clot in the leg needs to be removed surgically.   If you are unable to take anticoagulants, your caregiver may arrange for you to  have a filter placed in a main vein in your belly (abdomen). This filter prevents clots from traveling to your lungs.     HOME CARE INSTRUCTIONS  Take all medicines prescribed by your caregiver. Follow the directions carefully.   You will most likely continue taking anticoagulants after you leave the hospital. Your caregiver will advise you on the length of treatment (usually 3 to 5 months, sometimes for life).   Taking too much or too little of an anticoagulant is dangerous. While taking this type of medicine, you will need to have regular blood tests to be sure the dose is correct. The dose can change for many reasons. It is critically important that you take this medicine exactly as prescribed, and that you have blood tests exactly as directed.   Many foods can interfere with anticoagulants. These include foods high in vitamin K, such as spinach, kale, broccoli, cabbage, david and turnip greens, Diamond sprouts, peas, cauliflower, seaweed, parsley, beef and pork liver, green tea, and soybean oil. Your caregiver should discuss limits on these foods with you or you should arrange a visit with a dietician to answer your questions.   Many medicines can interfere with anticoagulants. You must tell your caregiver about any and all medicines you take. This includes all vitamins and supplements. Be especially cautious with aspirin and anti-inflammatory medicines. Ask your caregiver before taking these.   Anticoagulants can have side effects, mostly excessive bruising or bleeding. You will need to hold pressure over cuts for longer than usual. Avoid alcoholic drinks or consume only very small amounts while taking this medicine.    If you are taking an anticoagulant:  Wear a medical alert bracelet.  Notify your dentist or other caregivers before procedures.  Avoid contact sports.    Ask your caregiver how soon you can go back to normal activities. Not being active can lead to new clots. Ask for a list of what you should  and should not do.   Exercise your lower leg muscles. This is important while traveling.   You may need to wear compression stockings. These are tight elastic stockings that apply pressure to the lower legs. This can help keep the blood in the legs from clotting.   If you are a smoker, you should quit.   Learn as much as you can about DVT.     SEEK MEDICAL CARE IF:  You have unusual bruising or any bleeding problems.  The swelling or pain in your affected arm or leg is not gradually improving.   You anticipate surgery or long-distance travel. You should get specific advice on DVT prevention.   You discover other family members with blood clots. This may require further testing for inherited diseases or conditions.     SEEK IMMEDIATE MEDICAL CARE IF:  You develop chest pain.  You develop severe shortness of breath.  You begin to cough up bloody mucus or phlegm (sputum).  You feel dizzy or faint.   You develop swelling or pain in the leg.  You have breathing problems after traveling.    MAKE SURE YOU:  Understand these instructions.  Will watch your condition.  Will get help right away if you are not doing well or get worse.     -Is this patient being discharged with medication to prevent blood clots?  Yes, Lovenox   Enoxaparin injection  What is this medicine?  ENOXAPARIN (ee nox a PA rin) is used after knee, hip, or abdominal surgeries to prevent blood clotting. It is also used to treat existing blood clots in the lungs or in the veins.  This medicine may be used for other purposes; ask your health care provider or pharmacist if you have questions.  COMMON BRAND NAME(S): Lovenox  What should I tell my health care provider before I take this medicine?  They need to know if you have any of these conditions:  bleeding disorders, hemorrhage, or hemophilia  infection of the heart or heart valves  kidney or liver disease  previous stroke  prosthetic heart valve  recent surgery or delivery of a baby  ulcer in the stomach  or intestine, diverticulitis, or other bowel disease  an unusual or allergic reaction to enoxaparin, heparin, pork or pork products, other medicines, foods, dyes, or preservatives  pregnant or trying to get pregnant  breast-feeding  How should I use this medicine?  This medicine is for injection under the skin. It is usually given by a health-care professional. You or a family member may be trained on how to give the injections. If you are to give yourself injections, make sure you understand how to use the syringe, measure the dose if necessary, and give the injection. To avoid bruising, do not rub the site where this medicine has been injected. Do not take your medicine more often than directed. Do not stop taking except on the advice of your doctor or health care professional.  Make sure you receive a puncture-resistant container to dispose of the needles and syringes once you have finished with them. Do not reuse these items. Return the container to your doctor or health care professional for proper disposal.  Talk to your pediatrician regarding the use of this medicine in children. Special care may be needed.  Overdosage: If you think you have taken too much of this medicine contact a poison control center or emergency room at once.  NOTE: This medicine is only for you. Do not share this medicine with others.  What if I miss a dose?  If you miss a dose, take it as soon as you can. If it is almost time for your next dose, take only that dose. Do not take double or extra doses.  What may interact with this medicine?  aspirin and aspirin-like medicines  certain medicines that treat or prevent blood clots  dipyridamole  NSAIDs, medicines for pain and inflammation, like ibuprofen or naproxen  This list may not describe all possible interactions. Give your health care provider a list of all the medicines, herbs, non-prescription drugs, or dietary supplements you use. Also tell them if you smoke, drink alcohol, or use  illegal drugs. Some items may interact with your medicine.  What should I watch for while using this medicine?  Visit your healthcare professional for regular checks on your progress. You may need blood work done while you are taking this medicine. Your condition will be monitored carefully while you are receiving this medicine. It is important not to miss any appointments.  If you are going to need surgery or other procedure, tell your healthcare professional that you are using this medicine.  Using this medicine for a long time may weaken your bones and increase the risk of bone fractures.  Avoid sports and activities that might cause injury while you are using this medicine. Severe falls or injuries can cause unseen bleeding. Be careful when using sharp tools or knives. Consider using an electric razor. Take special care brushing or flossing your teeth. Report any injuries, bruising, or red spots on the skin to your healthcare professional.  Wear a medical ID bracelet or chain. Carry a card that describes your disease and details of your medicine and dosage times.  What side effects may I notice from receiving this medicine?  Side effects that you should report to your doctor or health care professional as soon as possible:  allergic reactions like skin rash, itching or hives, swelling of the face, lips, or tongue  bone pain  signs and symptoms of bleeding such as bloody or black, tarry stools; red or dark-brown urine; spitting up blood or brown material that looks like coffee grounds; red spots on the skin; unusual bruising or bleeding from the eye, gums, or nose  signs and symptoms of a blood clot such as chest pain; shortness of breath; pain, swelling, or warmth in the leg  signs and symptoms of a stroke such as changes in vision; confusion; trouble speaking or understanding; severe headaches; sudden numbness or weakness of the face, arm or leg; trouble walking; dizziness; loss of coordination  Side effects  that usually do not require medical attention (report to your doctor or health care professional if they continue or are bothersome):  hair loss  pain, redness, or irritation at site where injected  This list may not describe all possible side effects. Call your doctor for medical advice about side effects. You may report side effects to FDA at 9-943-GXM-5602.  Where should I keep my medicine?  Keep out of the reach of children.  Store at room temperature between 15 and 30 degrees C (59 and 86 degrees F). Do not freeze. If your injections have been specially prepared, you may need to store them in the refrigerator. Ask your pharmacist. Throw away any unused medicine after the expiration date.  NOTE: This sheet is a summary. It may not cover all possible information. If you have questions about this medicine, talk to your doctor, pharmacist, or health care provider.  © 2020 [employer] (2018-12-13 11:25:34)    Is patient discharged on Warfarin / Coumadin?   Yes    You are receiving the drug warfarin. Please understand the importance of monitoring warfarin with scheduled PT/INR blood draws.  Follow-up with the [facility] in one week for INR lab..    IMPORTANT: HOW TO USE THIS INFORMATION:  This is a summary and does NOT have all possible information about this product. This information does not assure that this product is safe, effective, or appropriate for you. This information is not individual medical advice and does not substitute for the advice of your health care professional. Always ask your health care professional for complete information about this product and your specific health needs.      WARFARIN - ORAL (WARF-uh-rin)      COMMON BRAND NAME(S): Coumadin      WARNING:  Warfarin can cause very serious (possibly fatal) bleeding. This is more likely to occur when you first start taking this medication or if you take too much warfarin. To decrease your risk for bleeding, your doctor or other  "health care provider will monitor you closely and check your lab results (INR test) to make sure you are not taking too much warfarin. Keep all medical and laboratory appointments. Tell your doctor right away if you notice any signs of serious bleeding. See also Side Effects section.      USES:  This medication is used to treat blood clots (such as in deep vein thrombosis-DVT or pulmonary embolus-PE) and/or to prevent new clots from forming in your body. Preventing harmful blood clots helps to reduce the risk of a stroke or heart attack. Conditions that increase your risk of developing blood clots include a certain type of irregular heart rhythm (atrial fibrillation), heart valve replacement, recent heart attack, and certain surgeries (such as hip/knee replacement). Warfarin is commonly called a \"blood thinner,\" but the more correct term is \"anticoagulant.\" It helps to keep blood flowing smoothly in your body by decreasing the amount of certain substances (clotting proteins) in your blood.      HOW TO USE:  Read the Medication Guide provided by your pharmacist before you start taking warfarin and each time you get a refill. If you have any questions, ask your doctor or pharmacist. Take this medication by mouth with or without food as directed by your doctor or other health care professional, usually once a day. It is very important to take it exactly as directed. Do not increase the dose, take it more frequently, or stop using it unless directed by your doctor. Dosage is based on your medical condition, laboratory tests (such as INR), and response to treatment. Your doctor or other health care provider will monitor you closely while you are taking this medication to determine the right dose for you. Use this medication regularly to get the most benefit from it. To help you remember, take it at the same time each day. It is important to eat a balanced, consistent diet while taking warfarin. Some foods can affect how " warfarin works in your body and may affect your treatment and dose. Avoid sudden large increases or decreases in your intake of foods high in vitamin K (such as broccoli, cauliflower, cabbage, brussels sprouts, kale, spinach, and other green leafy vegetables, liver, green tea, certain vitamin supplements). If you are trying to lose weight, check with your doctor before you try to go on a diet. Cranberry products may also affect how your warfarin works. Limit the amount of cranberry juice (16 ounces/480 milliliters a day) or other cranberry products you may drink or eat.      SIDE EFFECTS:  Nausea, loss of appetite, or stomach/abdominal pain may occur. If any of these effects persist or worsen, tell your doctor or pharmacist promptly. Remember that your doctor has prescribed this medication because he or she has judged that the benefit to you is greater than the risk of side effects. Many people using this medication do not have serious side effects. This medication can cause serious bleeding if it affects your blood clotting proteins too much (shown by unusually high INR lab results). Even if your doctor stops your medication, this risk of bleeding can continue for up to a week. Tell your doctor right away if you have any signs of serious bleeding, including: unusual pain/swelling/discomfort, unusual/easy bruising, prolonged bleeding from cuts or gums, persistent/frequent nosebleeds, unusually heavy/prolonged menstrual flow, pink/dark urine, coughing up blood, vomit that is bloody or looks like coffee grounds, severe headache, dizziness/fainting, unusual or persistent tiredness/weakness, bloody/black/tarry stools, chest pain, shortness of breath, difficulty swallowing. Tell your doctor right away if any of these unlikely but serious side effects occur: persistent nausea/vomiting, severe stomach/abdominal pain, yellowing eyes/skin. This drug rarely has caused very serious (possibly fatal) problems if its effects lead  to small blood clots (usually at the beginning of treatment). This can lead to severe skin/tissue damage that may require surgery or amputation if left untreated. Patients with certain blood conditions (protein C or S deficiency) may be at greater risk. Get medical help right away if any of these rare but serious side effects occur: painful/red/purplish patches on the skin (such as on the toe, breast, abdomen), change in the amount of urine, vision changes, confusion, slurred speech, weakness on one side of the body. A very serious allergic reaction to this drug is rare. However, get medical help right away if you notice any symptoms of a serious allergic reaction, including: rash, itching/swelling (especially of the face/tongue/throat), severe dizziness, trouble breathing. This is not a complete list of possible side effects. If you notice other effects not listed above, contact your doctor or pharmacist. In the [place] - Call your doctor for medical advice about side effects. You may report side effects to FDA at 4-299-PKP-3092. In [place] - Call your doctor for medical advice about side effects. You may report side effects to [facility] [place] at 1-557.539.1439.      PRECAUTIONS:  Before taking warfarin, tell your doctor or pharmacist if you are allergic to it; or if you have any other allergies. This product may contain inactive ingredients, which can cause allergic reactions or other problems. Talk to your pharmacist for more details. Before using this medication, tell your doctor or pharmacist your medical history, especially of: blood disorders (such as anemia, hemophilia), bleeding problems (such as bleeding of the stomach/intestines, bleeding in the brain), blood vessel disorders (such as aneurysms), recent major injury/surgery, liver disease, alcohol use, mental/mood disorders (including memory problems), frequent falls/injuries. It is important that all your doctors and dentists know that you take warfarin. Before  having surgery or any medical/dental procedures, tell your doctor or dentist that you are taking this medication and about all the products you use (including prescription drugs, nonprescription drugs, and herbal products). Avoid getting injections into the muscles. If you must have an injection into a muscle (for example, a flu shot), it should be given in the arm. This way, it will be easier to check for bleeding and/or apply pressure bandages. This medication may cause stomach bleeding. Daily use of alcohol while using this medicine will increase your risk for stomach bleeding and may also affect how this medication works. Limit or avoid alcoholic beverages. If you have not been eating well, if you have an illness or infection that causes fever, vomiting, or diarrhea for more than 2 days, or if you start using any antibiotic medications, contact your doctor or pharmacist immediately because these conditions can affect how warfarin works. This medication can cause heavy bleeding. To lower the chance of getting cut, bruised, or injured, use great caution with sharp objects like safety razors and nail cutters. Use an electric razor when shaving and a soft toothbrush when brushing your teeth. Avoid activities such as contact sports. If you fall or injure yourself, especially if you hit your head, call your doctor immediately. Your doctor may need to check you. The Food & Drug Administration has stated that generic warfarin products are interchangeable. However, consult your doctor or pharmacist before switching warfarin products. Be careful not to take more than one medication that contains warfarin unless specifically directed by the doctor or health care provider who is monitoring your warfarin treatment. Older adults may be at greater risk for bleeding while using this drug. This medication is not recommended for use during pregnancy because of serious (possibly fatal) harm to an unborn baby. Discuss the use of  "reliable forms of birth control with your doctor. If you become pregnant or think you may be pregnant, tell your doctor immediately. If you are planning pregnancy, discuss a plan for managing your condition with your doctor before you become pregnant. Your doctor may switch the type of medication you use during pregnancy. Very small amounts of this medication may pass into breast milk but is unlikely to harm a nursing infant. Consult your doctor before breast-feeding.      DRUG INTERACTIONS:  Drug interactions may change how your medications work or increase your risk for serious side effects. This document does not contain all possible drug interactions. Keep a list of all the products you use (including prescription/nonprescription drugs and herbal products) and share it with your doctor and pharmacist. Do not start, stop, or change the dosage of any medicines without your doctor's approval. Warfarin interacts with many prescription, nonprescription, vitamin, and herbal products. This includes medications that are applied to the skin or inside the vagina or rectum. The interactions with warfarin usually result in an increase or decrease in the \"blood-thinning\" (anticoagulant) effect. Your doctor or other health care professional should closely monitor you to prevent serious bleeding or clotting problems. While taking warfarin, it is very important to tell your doctor or pharmacist of any changes in medications, vitamins, or herbal products that you are taking. Some products that may interact with this drug include: capecitabine, imatinib, mifepristone. Aspirin, aspirin-like drugs (salicylates), and nonsteroidal anti-inflammatory drugs (NSAIDs such as ibuprofen, naproxen, celecoxib) may have effects similar to warfarin. These drugs may increase the risk of bleeding problems if taken during treatment with warfarin. Carefully check all prescription/nonprescription product labels (including drugs applied to the skin " such as pain-relieving creams) since the products may contain NSAIDs or salicylates. Talk to your doctor about using a different medication (such as acetaminophen) to treat pain/fever. Low-dose aspirin and related drugs (such as clopidogrel, ticlopidine) should be continued if prescribed by your doctor for specific medical reasons such as heart attack or stroke prevention. Consult your doctor or pharmacist for more details. Many herbal products interact with warfarin. Tell your doctor before taking any herbal products, especially bromelains, coenzyme Q10, cranberry, danshen, dong quai, fenugreek, garlic, ginkgo biloba, ginseng, and [place]'s wort, among others. This medication may interfere with a certain laboratory test to measure theophylline levels, possibly causing false test results. Make sure laboratory personnel and all your doctors know you use this drug.      OVERDOSE:  If overdose is suspected, contact a poison control center or emergency room immediately.  residents can call the  National Poison Hotline at 1-294.563.9308. [place] residents can call a provincial poison control center. Symptoms of overdose may include: bloody/black/tarry stools, pink/dark urine, unusual/prolonged bleeding.      NOTES:  Do not share this medication with others. Laboratory and/or medical tests (such as INR, complete blood count) must be performed periodically to monitor your progress or check for side effects. Consult your doctor for more details.      MISSED DOSE:  For the best possible benefit, do not miss any doses. If you do miss a dose and remember on the same day, take it as soon as you remember. If you remember on the next day, skip the missed dose and resume your usual dosing schedule. Do not double the dose to catch up because this could increase your risk for bleeding. Keep a record of missed doses to give to your doctor or pharmacist. Contact your doctor or pharmacist if you miss 2 or more doses in a row.       STORAGE:  Store at room temperature away from light and moisture. Do not store in the bathroom. Keep all medications away from children and pets. Do not flush medications down the toilet or pour them into a drain unless instructed to do so. Properly discard this product when it is  or no longer needed. Consult your pharmacist or local waste disposal company for more details about how to safely discard your product.      MEDICAL ALERT:  Your condition and medication can cause complications in a medical emergency. For information about enrolling in MedicAlert, call 1-523.800.6846 ([place]) or 1-961.876.5233 ([place]).      Information last revised 2010 Copyright(c)  [employer]

## 2022-08-01 NOTE — PROGRESS NOTES
Patient establishing with [facility] tomorrow. Placed order for PT/INR.     Coreen Crowder, NeryD

## 2022-08-01 NOTE — DISCHARGE PLANNING
Agency/Facility Name: [facility]  Spoke To: Nori  Outcome: Referral is accepted.  Nori is trying to reach out to the pt for the co-pay.  Pt is not answering cell phone and can't get in touch with the emergency contact.      Supervisor Stacie notified via Teams.    [phone]  Agency/Facility Name: [facility]  Spoke To: Nori  Outcome: Nori spoke with the pt regarding his co-pay.  Pt is good to go.

## 2022-08-01 NOTE — DISCHARGE SUMMARY
Discharge Summary    CHIEF COMPLAINT ON ADMISSION  Chief Complaint   Patient presents with   • UTI     Patient concerned for a UTI, not urinating often and when he does it's very small amounts, last urination 1400 today       Reason for Admission  EMS     Admission Date  7/25/2022    CODE STATUS  Full Code    HPI & HOSPITAL COURSE  Jama Altman is a 66 y.o. male with a history of diabetes, polycystic kidney disease and had a subsequent renal transplant in 2010 and has been on immunosuppressant.  Of significance he was recently admitted in early July of this year for extended spectrum beta-lactamase E. coli bacteremia secondary to urinary source and had been on meropenem.  The patient was admitted 7/25/2022 with recurrent sepsis from UTI.  He was found to have recurrent gram-negative bacteremia.  Initially he required pressor support to maintain his blood pressure.  He was downgraded to IMCU on 7/7/26/2022 and back to regular floor on 7/30 2022. Infectious disease has been consulted and was initiated on meropenem again. ID recommended ertepenem daily till 8/8/201 followed by Ciprofloxacin for 2 weeks starting 8/9/2021.  He was started on Lovenox bridge with warfarin for left upper extremity DVT.   assisting with outpatient IV antibiotic set up.      During hospital course patient remain  hemodynamically stable ,asymptomatic ,labs remain unremarkable .  INR subtherapeutic 1.42.  Will be discharged on Lovenox and warfarin with Coumadin clinic follow-up in next 3 days.  Patient will be discharged with close follow up with PCP, ID, urology. Discharge plan was discussed with patient in details .  Patient agreed with discharge plan  and  all questions answered.    Therefore, he is discharged in good and stable condition to home with close outpatient follow-up.    The patient met 2-midnight criteria for an inpatient stay at the time of discharge.    Discharge Date  8/1/2022          DISCHARGE  DIAGNOSES  Principal Problem:    Sepsis(995.91) POA: Unknown  Active Problems:    History of renal transplant POA: Yes      Overview:     Hyperlipidemia POA: Yes      Overview:     SANKET (acute kidney injury) (HCC) POA: Yes    Thrombocytopenia (HCC) POA: Yes    GERD (gastroesophageal reflux disease) POA: Yes    Diabetes mellitus with coincident hypertension (HCC) POA: Yes    PAF (paroxysmal atrial fibrillation) ([facility]) POA: Yes    Wound of right lower extremity POA: Yes    Urinary tract infection POA: Yes    Septic shock (HCC) POA: Yes    Immunosuppressed status (HCC) POA: Yes    Secondary adrenal insufficiency (HCC) POA: Yes    Acute deep vein thrombosis (DVT) of left upper extremity (HCC) POA: Unknown  Resolved Problems:    * No resolved hospital problems. *      FOLLOW UP  Future Appointments   Date Time Provider Department Center   8/9/2022  8:00 AM Ganesh Benton III, M.D. [facility]     No follow-up provider specified.    MEDICATIONS ON DISCHARGE     Medication List      START taking these medications      Instructions   ciprofloxacin 500 MG Tabs  Start taking on: August 9, 2022  Commonly known as: CIPRO   Take 1 Tablet by mouth 2 times a day for 14 days.  Dose: 500 mg     enoxaparin 100 MG/ML Sosy inj  Commonly known as: Lovenox   Inject 100 mg under the skin every 12 hours for 5 days.  Dose: 1 mg/kg     vitamin D2 (Ergocalciferol) 1.25 MG (88966 UT) Caps capsule  Start taking on: August 3, 2022  Commonly known as: Drisdol   Take 1 Capsule by mouth every 7 days for 14 days.  Dose: 50,000 Units     warfarin 5 MG Tabs  Commonly known as: COUMADIN   Take 1 Tablet by mouth every day at 6 PM for 5 days.  Dose: 5 mg        CHANGE how you take these medications      Instructions   tacrolimus 1 MG Caps  What changed: additional instructions  Commonly known as: PROGRAF   Take 1 Capsule by mouth 2 times a day.  Dose: 1 mg        CONTINUE taking these medications      Instructions   acetaminophen 325 MG Tabs  Commonly  known as: Tylenol   Take 650 mg by mouth every four hours as needed for Mild Pain.  Dose: 650 mg     atorvastatin 80 MG tablet  Commonly known as: LIPITOR   Take 1 Tablet by mouth at bedtime.  Dose: 80 mg     carboxymethylcellulose 0.5 % Soln  Commonly known as: REFRESH TEARS   Administer 1 Drop into both eyes 3 times a day.  Dose: 1 Drop     carvedilol 3.125 MG Tabs  Commonly known as: COREG   Take 3.125 mg by mouth 2 times a day. Hold for sbp < 100   Hold for dbp < 60  Dose: 3.125 mg     cyclobenzaprine 5 mg tablet  Commonly known as: Flexeril   Take 5-10 mg by mouth 3 times a day as needed for Muscle Spasms.  Dose: 5-10 mg     gabapentin 300 MG Caps  Commonly known as: NEURONTIN   Take 600 mg by mouth at bedtime.  Dose: 600 mg     glyBURIDE 5 MG Tabs  Commonly known as: DIABETA   Take 10 mg by mouth every morning with breakfast. 2 tablets = 10 mg.  Dose: 10 mg     mycophenolate 500 MG tablet  Commonly known as: CELLCEPT   Take 500 mg by mouth 2 times a day. 0800  2000  Dose: 500 mg     predniSONE 5 MG Tabs  Commonly known as: DELTASONE   Take 1 Tablet by mouth every day.  Dose: 5 mg     Pro-Stat Liqd   Take 30 mL by mouth every day.  Dose: 30 mL     SITagliptin 50 MG Tabs  Commonly known as: Januvia   Take 1 Tablet by mouth every day.  Dose: 50 mg        STOP taking these medications    apixaban 5mg Tabs  Commonly known as: ELIQUIS     meropenem 500 MG Solr  Commonly known as: Merrem     midodrine 5 MG Tabs  Commonly known as: PROAMATINE            Allergies  Allergies   Allergen Reactions   • Doxycycline Rash     Sweats and shakes: 9/28/17: Clarified allergy with patient. Allergy was in 1998 and he doesn't remember what happened. He thought the medication is for pain.  Tolerates doxycycline 9/2017       DIET  Orders Placed This Encounter   Procedures   • Diet Order Diet: Renal     Standing Status:   Standing     Number of Occurrences:   1     Order Specific Question:   Diet:     Answer:   Renal [8]        ACTIVITY  As tolerated.  Weight bearing as tolerated    CONSULTATIONS  Critical care  ID    PROCEDURES  IR-MIDLINE CATHETER INSERTION WO GUIDANCE > AGE 3   Final Result                  Ultrasound-guided midline placement performed by qualified nursing staff    as above.          US-EXTREMITY VENOUS UPPER BILAT   Final Result      IR-MIDLINE CATHETER INSERTION WO GUIDANCE > AGE 3   Final Result                  Ultrasound-guided midline placement performed by qualified nursing staff    as above.          CT-ABDOMEN-PELVIS WITH   Final Result         1.  Fluid-filled prominence of small bowel, consider ileus and/or enteritis. Radiographic follow-up to resolution recommended as clinically appropriate to exclude progression to obstructive changes.   2.  Periportal edema, nonspecific, can be associated with acute hepatitis in the appropriate clinical setting.   3.  Changes of the left kidney most compatible with polycystic kidney disease.   4.  3.0 cm fusiform infrarenal abdominal aortic aneurysm, radiographic follow-up and surveillance recommended as clinically appropriate.   5.  Fat-containing left inguinal hernia   6.  Atherosclerosis and atherosclerotic coronary artery disease      DX-CHEST-PORTABLE (1 VIEW)   Final Result      No significant interval change.            LABORATORY  Lab Results   Component Value Date    SODIUM 137 07/30/2022    POTASSIUM 3.6 07/30/2022    CHLORIDE 105 07/30/2022    CO2 22 07/30/2022    GLUCOSE 126 (H) 07/30/2022    BUN 17 07/30/2022    CREATININE 1.27 07/30/2022    CREATININE 2.4 (H) 12/18/2006        Lab Results   Component Value Date    WBC 5.5 07/30/2022    HEMOGLOBIN 9.0 (L) 07/30/2022    HEMATOCRIT 28.9 (L) 07/30/2022    PLATELETCT 153 (L) 07/30/2022        Total time of the discharge process exceeds 37  minutes.

## 2022-08-01 NOTE — CARE PLAN
The patient is Stable - Low risk of patient condition declining or worsening    Shift Goals  Clinical Goals: comfort  Patient Goals: pt will be able to sleep and rest comfortably  Family Goals: JUDITH    Progress made toward(s) clinical / shift goals:      Patient is not progressing towards the following goals:

## 2022-08-02 ENCOUNTER — HOME CARE VISIT (OUTPATIENT)
Dept: HOME HEALTH SERVICES | Facility: HOME HEALTHCARE | Age: 67
End: 2022-08-02
Payer: MEDICARE

## 2022-08-02 ENCOUNTER — DOCUMENTATION (OUTPATIENT)
Dept: CARDIOLOGY | Facility: MEDICAL CENTER | Age: 67
End: 2022-08-02
Payer: MEDICARE

## 2022-08-02 ENCOUNTER — PATIENT OUTREACH (OUTPATIENT)
Dept: MEDICAL GROUP | Facility: PHYSICIAN GROUP | Age: 67
End: 2022-08-02
Payer: MEDICARE

## 2022-08-02 VITALS
HEIGHT: 78 IN | WEIGHT: 203.31 LBS | TEMPERATURE: 98.1 F | DIASTOLIC BLOOD PRESSURE: 58 MMHG | OXYGEN SATURATION: 100 % | SYSTOLIC BLOOD PRESSURE: 112 MMHG | HEART RATE: 89 BPM | RESPIRATION RATE: 16 BRPM | BODY MASS INDEX: 23.52 KG/M2

## 2022-08-02 LAB — INR PPP: 1.7 - CRITICAL LOW: < 0.8, CRITICAL HIGH: > 6.5 (ref 2–3.5)

## 2022-08-02 PROCEDURE — G0493 RN CARE EA 15 MIN HH/HOSPICE: HCPCS

## 2022-08-02 PROCEDURE — 665001 SOC-HOME HEALTH

## 2022-08-02 ASSESSMENT — ENCOUNTER SYMPTOMS
PAIN SEVERITY GOAL: 0/10
PAIN: 1
HIGHEST PAIN SEVERITY IN PAST 24 HOURS: 4/10
PAIN LOCATION - PAIN FREQUENCY: INTERMITTENT
NAUSEA: DENIES
PAIN LOCATION: BACK
PAIN LOCATION - RELIEVING FACTORS: TYLENOL, HEAT
PERSON REPORTING PAIN: PATIENT
LOWEST PAIN SEVERITY IN PAST 24 HOURS: 0/10
VOMITING: DENIES

## 2022-08-02 ASSESSMENT — PATIENT HEALTH QUESTIONNAIRE - PHQ9
1. LITTLE INTEREST OR PLEASURE IN DOING THINGS: 00
2. FEELING DOWN, DEPRESSED, IRRITABLE, OR HOPELESS: 00
CLINICAL INTERPRETATION OF PHQ2 SCORE: 0

## 2022-08-02 ASSESSMENT — ACTIVITIES OF DAILY LIVING (ADL): OASIS_M1830: 03

## 2022-08-02 ASSESSMENT — FIBROSIS 4 INDEX: FIB4 SCORE: 1.95

## 2022-08-02 NOTE — PROGRESS NOTES
Subjective:     Jama Altman is a 66 y.o. male who presents for Hospital Follow-up.    POST DISCHARGE CALL:  Discharge Date:8/2/2022   Date of Outreach Call: 8/2/2022  9:27 AM  Now that you're home, how are you doing? Good  Do you have questions about your medications? No  Did you fill your medications? Yes  Do you have a follow-up appointment scheduled?Yes  Discharging Department: [facility]  Number of Attempts: 1  Current or previous attempts completed within two business days of discharge? Yes  Provided education regarding treatment plan, medication, self-management, ADLs? Yes  Has patient completed Advance Directive? If yes, advise them to bring to appointment. Yes  Care Manager phone number provided? Yes  Is there anything else I can help you with? No    HPI:   Recently hospitalized for :  Hospital discharge follow-up  Patient is here today for follow-up.  He has had a couple hospitalizations recently for urinary tract infection and sepsis.  The last one was just a little over a week ago.  After being treated with IV antibiotics he is just started a 2-week course of Cipro.  He is due to see the infectious disease expert soon.  They did not find an etiology for his recurrent infections.  Patient states they were thinking about sending him to urology but decided not to.  Today he is feeling good except he still weak in his legs and working to get stronger.    Acute on chronic systolic heart failure (HCC)  This is a chronic problem.  Patient states he just got an appointment to see the cardiologist is working on issues.  No records available as of yet.    Acute deep vein thrombosis (DVT) of left upper extremity (HCC)  This is a new problem that occurred during recent hospitalization.  He was on Eliquis at that time so we changed him to Coumadin.  He has been followed at the Coumadin clinic.  Plan is for 3 months of anticoagulation.    Hyperlipidemia  This is a chronic problem.  He has not had his lipids done  in over a year.  Lab will be ordered for him to do with his next set of routine labs.    Immunosuppressed status (HCC)  This is a chronic problem.  Continue care through his nephrologist.    Secondary adrenal insufficiency (HCC)  This is a chronic problem.  It is due to the steroid use.  Continue care through nephrology.    Type 2 diabetes mellitus with kidney complication, without long-term current use of insulin (HCC)  This is a chronic problem.  His hemoglobin A1c in the hospital was very good at 6.2%.  We will recheck it again in 3 months.  He does state that he has had his eye exam and records requested.    Abdominal aortic aneurysm (AAA) without rupture (HCC)  This is a new finding.  It was found on recent CT scan of the abdomen.  He has a 3.0 infrarenal abdominal aneurysm.  No symptoms.        Current medicines (including reconciliation performed today)  Current Outpatient Medications   Medication Sig Dispense Refill   • mycophenolate (CELLCEPT) 250 MG Cap      • NS SOLN 100 mL with ertapenem 1 GM SOLR 1,000 mg Infuse 1,000 mg into a venous catheter every day. 1 gm in  mL mini-bag Plus. Infuse via IV gravity over 30 min once every 24 hr.  8 doses     • warfarin (COUMADIN) 5 MG Tab Take 1 Tablet by mouth every day at 6 PM. As directed by [facility] 90 Tablet 1   • carvedilol (COREG) 3.125 MG Tab Take 1 Tablet by mouth 2 times a day. Hold for sbp < 100 Hold for dbp < 60 200 Tablet 3   • omeprazole (PRILOSEC) 20 MG delayed-release capsule Take 20 mg by mouth every day.     • pioglitazone (ACTOS) 45 MG Tab Take 45 mg by mouth every day.     • Coenzyme Q10 (COQ10) 200 MG Cap Take 1 Capsule by mouth every day.     • HEPARIN LOCK FLUSH IV Infuse 5 mL into a venous catheter every day. may flush after blood draw as well   heparin 10 units/ml infuse 5 ml for a total of 50 units     • Sodium Chloride Flush (NORMAL SALINE FLUSH IV) Infuse 10 mL into a venous catheter every day. and with PRN lab  "draws  flush with 10 ml before and after med administration.  Flush pre and post lab draws with 20 mls.     • ertapenem (INVANZ) 1 GM Recon Soln Infuse 1 g into a venous catheter every day. Infuse via gravity infusion over 30 minutes (200ml/hr) once every 24 hours.  Use within 6 hours of mixing.     • vitamin D2, Ergocalciferol, (DRISDOL) 1.25 MG (32148 UT) Cap capsule Take 1 Capsule by mouth every 7 days for 14 days. 2 Capsule 0   • ciprofloxacin (CIPRO) 500 MG Tab Take 1 Tablet by mouth 2 times a day for 14 days. 28 Tablet 0   • mycophenolate (CELLCEPT) 500 MG tablet Take 500 mg by mouth 2 times a day. 0800 2000     • acetaminophen (TYLENOL) 325 MG Tab Take 650 mg by mouth every four hours as needed for Mild Pain.     • gabapentin (NEURONTIN) 300 MG Cap Take 600 mg by mouth at bedtime.     • predniSONE (DELTASONE) 5 MG Tab Take 1 Tablet by mouth every day. 30 Tablet 0   • tacrolimus (PROGRAF) 1 MG Cap Take 1 Capsule by mouth 2 times a day. (Patient taking differently: Take 1 mg by mouth 2 times a day. 0800 2000) 60 Capsule 3   • atorvastatin (LIPITOR) 80 MG tablet Take 1 Tablet by mouth at bedtime. 90 Tablet 3   • SITagliptin (JANUVIA) 50 MG Tab Take 1 Tablet by mouth every day. 90 Tablet 3   • glyBURIDE (DIABETA) 5 MG Tab Take 10 mg by mouth every morning with breakfast. 2 tablets = 10 mg.       No current facility-administered medications for this visit.       Allergies:   Doxycycline    Social History     Tobacco Use   • Smoking status: Never Smoker   • Smokeless tobacco: Never Used   Vaping Use   • Vaping Use: Never used   Substance Use Topics   • Alcohol use: No   • Drug use: No       ROS:  Weak otherwise feeling well.    Objective:     Vitals:    08/09/22 0748   BP: 128/84   BP Location: Right arm   Patient Position: Sitting   BP Cuff Size: Adult   Pulse: 76   Resp: 18   Temp: 36.7 °C (98 °F)   TempSrc: Temporal   SpO2: 97%   Weight: 112 kg (247 lb 11.2 oz)   Height: 1.956 m (6' 5\")     Body mass index is " 29.37 kg/m².    Physical Exam:  General: Patient appears in no acute distress.  He does appear to be slightly pale.  Alert and cooperative.  Lungs: Clear to auscultation  Heart: Regular rate and rhythm without murmurs or gallop.    Assessment and Plan:   1. Prostate cancer screening  - PROSTATE SPECIFIC AG SCREENING; Future  Lab ordered  2. Hyperlipidemia, unspecified hyperlipidemia type  - Lipid Profile; Future  This is a chronic condition.  Lab ordered.  3. Type 2 diabetes mellitus with other diabetic kidney complication, without long-term current use of insulin (HCC)  - HEMOGLOBIN A1C; Future  This is a chronic problem under good control.  Continue to follow.  Hemoglobin A1c recheck in 3 months.  4. Immunosuppressed status (HCC)  This is a chronic problem.  Continue follow-up with nephrology.  5. Acute on chronic systolic heart failure (HCC)  This is a chronic problem.  Continue follow-up through cardiology.  6. Acute deep vein thrombosis (DVT) of left upper extremity, unspecified vein (HCC)  This is a chronic problem.  Continue to follow.  7. Secondary adrenal insufficiency (HCC)  This is a chronic problem.  Continue to follow-up with nephrology.  8. Hospital discharge follow-up  This is an acute issue.  Patient appears to have improved from his infection.  Continue to follow.  Continue follow-up with infectious disease in the next week or 2.      Recheck his abdominal aneurysm in 1 year or through cardiology.      - Chart and discharge summary were reviewed.   - Hospitalization and results reviewed with patient.   - Medications reviewed including instructions regarding high risk medications, dosing and side effects.  - Recommended Services: No services needed at this time  - Advance directive/POLST on file?  No     Follow-up:Return in about 3 months (around 11/9/2022) for Long.    Face-to-face transitional care management services with MODERATE (today's visit is within 14 days post discharge & LACE+ score of  28-58) medical decision complexity were provided.     LACE+ Historical Score Over Time (0-28: Low, 29-58: Medium, 59+: High): 80

## 2022-08-02 NOTE — CASE COMMUNICATION
Primary dx/Skilled need: s/p urosepsis on IV antibiotics via PICC, will need weekly lab draws and dressing changes. SN for open wound care Right LE and PICC dressing care. Currently bridging from lovenox to coumadin thru [facility], needs INRs. Newly dx in May with systolic HF. Has a hx of DM, kidney transplant, a,fib..   SN frequency: 3w9, and 3 PRN for PICC line or wound complications  Zip code: [address]  Disciplines ordered: PT  Insurance and  authorization: [facility]  Certification period: 8/2/2022 - 9/30/2022  Special considerations: NA

## 2022-08-02 NOTE — PROGRESS NOTES
Spoke with patient's daughter Mena, she only had questions about when the patient is supposed to get labs drawn for his coumadin levels. Informed her that the orders where placed and it looked like the Home health nurse should be able to draw his labs the pharmacist ordered, and he has an appointment with vascular medicine tomorrow that they should discuss his labs and medications with them. Verified both follow up appointments and his daughter verified his appointments. Jama did not have any questions regarding his discharge.

## 2022-08-02 NOTE — CASE COMMUNICATION
Pt open to  meds ready for review.  Major interactions as below.  Drug-Drug: atorvastatin and tacrolimus   Plasma concentrations of sirolimus and tacrolimus may be increased by statins. Similarly, sirolimus and tacrolimus may increase plasma concentrations of statins.   Details Major   atorvastatin (LIPITOR) 80 MG tablet   tacrolimus (PROGRAF) 1 MG Cap   Drug-Drug  <jscript:void(0)>  Drug-Drug: warfarin and ciprofloxacin   Hypoprothro mbinemic effects of warfarin may be increased by ciprofloxacin. Dosage reduction of warfarin may be required.   Details Major   warfarin (COUMADIN) 5 MG Tab   ciprofloxacin (CIPRO) 500 MG Tab   Drug-Drug  <jscript:void(0)>  Drug-Drug: glyBURIDE and ciprofloxacin   The hypoglycemic effect of glyBURIDE may be increased by ciprofloxacin especially in elderly patients with renal compromise. Hypoglycemia symptoms including lightheadedness, d iaphoresis, tachycardia and various neurologic and psychiatric disturbances may occur.   Details Major   glyBURIDE (DIABETA) 5 MG Tab   ciprofloxacin (CIPRO) 500 MG Tab   Drug-Drug  <jscript:void(0)>  Drug-Drug: ciprofloxacin and mycophenolate   Plasma concentrations and pharmacologic effects of mycophenolate may be decreased by ciprofloxacin.   Details Major   ciprofloxacin (CIPRO) 500 MG Tab   mycophenolate (CELLCEPT) 500 MG tablet

## 2022-08-02 NOTE — PROGRESS NOTES
Medication chart review for [facility] services    Received referral from [facility].   Medications reviewed  compared with discharge summary if available.    Current medication list per [facility]     Current Outpatient Medications:   •  warfarin, 5 mg, Oral, DAILY AT 1800, Taking  •  enoxaparin, 1 mg/kg, Subcutaneous, Q12HRS, Taking  •  [START ON 8/9/2022] ciprofloxacin, 500 mg, Oral, BID, Taking  •  carvedilol, 3.125 mg, Oral, BID, Taking  •  mycophenolate, 500 mg, Oral, BID, Taking  •  acetaminophen, 650 mg, Oral, Q4HRS PRN, Taking  •  gabapentin, 600 mg, Oral, QHS, Taking  •  glyBURIDE, 10 mg, Oral, QDAY with Breakfast, Taking  •  predniSONE, 5 mg, Oral, DAILY, Taking  •  tacrolimus, 1 mg, Oral, BID (Patient taking differently: 1 mg, Oral, 2 TIMES DAILY, 0800  2000), Taking  •  atorvastatin, 80 mg, Oral, QHS, Taking  •  SITagliptin, 50 mg, Oral, DAILY, Taking  •  omeprazole, 20 mg, Oral, DAILY  •  pioglitazone, 45 mg, Oral, DAILY  •  CoQ10, 1 Capsule, Oral, DAILY  •  HEPARIN LOCK FLUSH IV, 5 mL, Intravenous, DAILY  •  Sodium Chloride Flush (NORMAL SALINE FLUSH IV), 10 mL, Intravenous, DAILY  •  ertapenem, 1 g, Intravenous, DAILY  •  [START ON 8/3/2022] vitamin D2 (Ergocalciferol), 50,000 Units, Oral, Q7 DAYS    Location of hospital, and discharge summary date, if applicable:   [facility], discharge summary date 8/1/2022  Discharge medication summary, per discharge summary:            START taking these medications      Instructions   ciprofloxacin 500 MG Tabs  Start taking on: August 9, 2022  Commonly known as: CIPRO    Take 1 Tablet by mouth 2 times a day for 14 days.  Dose: 500 mg      enoxaparin 100 MG/ML Sosy inj  Commonly known as: Lovenox    Inject 100 mg under the skin every 12 hours for 5 days.  Dose: 1 mg/kg      vitamin D2 (Ergocalciferol) 1.25 MG (16520 UT) Caps capsule  Start taking on: August 3, 2022  Commonly known as: Drisdol    Take 1 Capsule by mouth every 7 days for 14  days.  Dose: 50,000 Units      warfarin 5 MG Tabs  Commonly known as: COUMADIN    Take 1 Tablet by mouth every day at 6 PM for 5 days.  Dose: 5 mg                  CHANGE how you take these medications      Instructions   tacrolimus 1 MG Caps  What changed: additional instructions  Commonly known as: PROGRAF    Take 1 Capsule by mouth 2 times a day.  Dose: 1 mg                  CONTINUE taking these medications      Instructions   acetaminophen 325 MG Tabs  Commonly known as: Tylenol    Take 650 mg by mouth every four hours as needed for Mild Pain.  Dose: 650 mg      atorvastatin 80 MG tablet  Commonly known as: LIPITOR    Take 1 Tablet by mouth at bedtime.  Dose: 80 mg      carboxymethylcellulose 0.5 % Soln  Commonly known as: REFRESH TEARS    Administer 1 Drop into both eyes 3 times a day.  Dose: 1 Drop      carvedilol 3.125 MG Tabs  Commonly known as: COREG    Take 3.125 mg by mouth 2 times a day. Hold for sbp < 100   Hold for dbp < 60  Dose: 3.125 mg      cyclobenzaprine 5 mg tablet  Commonly known as: Flexeril    Take 5-10 mg by mouth 3 times a day as needed for Muscle Spasms.  Dose: 5-10 mg      gabapentin 300 MG Caps  Commonly known as: NEURONTIN    Take 600 mg by mouth at bedtime.  Dose: 600 mg      glyBURIDE 5 MG Tabs  Commonly known as: DIABETA    Take 10 mg by mouth every morning with breakfast. 2 tablets = 10 mg.  Dose: 10 mg      mycophenolate 500 MG tablet  Commonly known as: CELLCEPT    Take 500 mg by mouth 2 times a day. 0800  2000  Dose: 500 mg      predniSONE 5 MG Tabs  Commonly known as: DELTASONE    Take 1 Tablet by mouth every day.  Dose: 5 mg      Pro-Stat Liqd    Take 30 mL by mouth every day.  Dose: 30 mL      SITagliptin 50 MG Tabs  Commonly known as: Januvia    Take 1 Tablet by mouth every day.  Dose: 50 mg          STOP taking these medications    apixaban 5mg Tabs  Commonly known as: ELIQUIS      meropenem 500 MG Solr  Commonly known as: Merrem      midodrine 5 MG Tabs  Commonly known as:  PROAMATINE         Allergies   Allergen Reactions   • Doxycycline Rash     Sweats and shakes: 9/28/17: Clarified allergy with patient. Allergy was in 1998 and he doesn't remember what happened. He thought the medication is for pain.  Tolerates doxycycline 9/2017         Labs     Lab Results   Component Value Date/Time    SODIUM 137 07/30/2022 06:19 AM    POTASSIUM 3.6 07/30/2022 06:19 AM    CHLORIDE 105 07/30/2022 06:19 AM    CO2 22 07/30/2022 06:19 AM    GLUCOSE 126 (H) 07/30/2022 06:19 AM    BUN 17 07/30/2022 06:19 AM    CREATININE 1.27 07/30/2022 06:19 AM    CREATININE 2.4 (H) 12/18/2006 06:20 AM     Lab Results   Component Value Date/Time    ALKPHOSPHAT 57 07/29/2022 04:18 AM    ASTSGOT 13 07/29/2022 04:18 AM    ALTSGPT 8 07/29/2022 04:18 AM    TBILIRUBIN 0.3 07/29/2022 04:18 AM    INR 1.42 (H) 08/01/2022 12:37 AM    ALBUMIN 1.8 (L) 07/29/2022 04:18 AM        Assessment for clinically significant drug interactions, drug omissions/additions, duplicative therapies.            CC   Ganesh Benton III, M.D.  [address]  [facility] [address]  Fax: [phone]    [facility]  Phone [phone] fax [phone]    This note was created using voice recognition software (Dragon). The accuracy of the dictation is limited by the abilities of the software. I have reviewed the note prior to signing, however some errors in grammar and context are still possible. If you have any questions related to this note please do not hesitate to contact our office.

## 2022-08-03 ENCOUNTER — HOME CARE VISIT (OUTPATIENT)
Dept: HOME HEALTH SERVICES | Facility: HOME HEALTHCARE | Age: 67
End: 2022-08-03
Payer: MEDICARE

## 2022-08-03 ENCOUNTER — ANTICOAGULATION VISIT (OUTPATIENT)
Dept: VASCULAR LAB | Facility: MEDICAL CENTER | Age: 67
End: 2022-08-03
Attending: INTERNAL MEDICINE
Payer: MEDICARE

## 2022-08-03 DIAGNOSIS — Z86.79 HISTORY OF ATRIAL FIBRILLATION: ICD-10-CM

## 2022-08-03 DIAGNOSIS — I82.622 ACUTE DEEP VEIN THROMBOSIS (DVT) OF LEFT UPPER EXTREMITY, UNSPECIFIED VEIN (HCC): ICD-10-CM

## 2022-08-03 LAB
INR BLD: 2.1 (ref 0.9–1.2)
INR PPP: 2.1 (ref 2–3.5)
TACROLIMUS BLD-MCNC: 6.6 NG/ML

## 2022-08-03 PROCEDURE — G0493 RN CARE EA 15 MIN HH/HOSPICE: HCPCS

## 2022-08-03 PROCEDURE — 85610 PROTHROMBIN TIME: CPT

## 2022-08-03 PROCEDURE — 99212 OFFICE O/P EST SF 10 MIN: CPT

## 2022-08-03 RX ORDER — WARFARIN SODIUM 5 MG/1
5 TABLET ORAL DAILY
Qty: 90 TABLET | Refills: 1 | Status: SHIPPED | OUTPATIENT
Start: 2022-08-03 | End: 2022-10-24

## 2022-08-03 RX ORDER — CARVEDILOL 3.12 MG/1
3.12 TABLET ORAL 2 TIMES DAILY
Qty: 200 TABLET | Refills: 3 | Status: SHIPPED | OUTPATIENT
Start: 2022-08-03 | End: 2022-12-06

## 2022-08-03 RX ORDER — CARVEDILOL 3.12 MG/1
3.12 TABLET ORAL 2 TIMES DAILY
Qty: 60 TABLET | OUTPATIENT
Start: 2022-08-03

## 2022-08-03 ASSESSMENT — FIBROSIS 4 INDEX: FIB4 SCORE: 1.95

## 2022-08-03 NOTE — CASE COMMUNICATION
Noted on SOC that patient did not have a prescription for Coreg that was listed on discharge med listed.  SN called during visit detailed message left with Dr Chetan VIRGEN voicemail.  Callback not received.  Second call made at 1630h voicemail left for MA.  Team follow up in the am 8/3/2022.  Thank you

## 2022-08-03 NOTE — TELEPHONE ENCOUNTER
Jasmine home health nurse called regarding patient stating that he had an RX at the pharmacy but  and was being given medication at the hospital as written on previous sig. Pt is not looking of rnew PCP and intends to see us next week at his appt.

## 2022-08-03 NOTE — PROGRESS NOTES
Anticoagulation Summary  As of 8/3/2022    INR goal:  2.0-3.0   TTR:  --   INR used for dosin.10 (8/3/2022)   Warfarin maintenance plan:  5 mg (5 mg x 1) every day   Weekly warfarin total:  35 mg   Plan last modified:  Nelida Flores, PharmD (8/3/2022)   Next INR check:  2022   Target end date:  Indefinite    Indications    History of atrial fibrillation [Z86.79]  Acute deep vein thrombosis (DVT) of left upper extremity (HCC) [I82.622]             Anticoagulation Episode Summary     INR check location:      Preferred lab:      Send INR reminders to:      Comments:  Switch back to Eliquis for AF if DVT is resolved after 3 months (around 10/25/22)      Anticoagulation Care Providers     Provider Role Specialty Phone number    [facility]   [phone]          Refer to Anticoagulation Patient Findings for HPI  Patient Findings     Negatives:  Signs/symptoms of thrombosis, Signs/symptoms of bleeding, Laboratory test error suspected, Change in health, Change in alcohol use, Change in activity, Upcoming invasive procedure, Emergency department visit, Upcoming dental procedure, Missed doses, Extra doses, Change in medications, Change in diet/appetite, Hospital admission, Bruising, Other complaints          PCP Ganesh Benton III, M.D.  Cardiologist none - needs referral d/t new onset AF in May 2022    Pt hx Pt was started on Eliquis back in May 2022 for new onset AF. He was rehospitalized for urosepsis and a PICC line was inserted for outpatient  IV antibiotic tx. On 22 he was found to have LUE DVT provoked by the PICC line. This is pt's first VTE    CHADSVASC = 4 (age, dm, htn, vasc dz)  HAS-BLED = 1 (age)  DOAC = developed LUE DVT on Eliquis  Target end date = 3 months on warfarin then switch to Eliquis for AF if DVT resolves    Pt is new to warfarin and new to [facility]. Discussed:   · Indication for warfarin therapy and INR goal range.   · Importance of monitoring and compliance.    · Monitoring parameters, signs and symptoms of bleeding or clotting.  · Warfarin therapy, side effects, potential DDIs, OTC medications  · DDI on 14 day course of cipro + chronic prednisone for hx of renal transplant  · Importance of diet consistency, ie vitamin K intake, supplements  · Lifestyle safety, ie smoking, ETOH, hobby safety, fall safety/prevention  · Procedures for missed doses or suspected missed doses, surgeries/procedures, travel, dental work, any medication changes    INR is therapeutic today.   Will continue to titrate pt's warfarin dose. Pt's been taking 5 mg daily   Will have pt continue with warfarin 5 mg daily + stop Lovenox    Recheck INR in 2 days on 8/5 via [facility].    Nelida Flores, PharmD    Added [facility] to care team   Send to Bloch

## 2022-08-03 NOTE — TELEPHONE ENCOUNTER
Elisa rodrigues homehealth RN LVM regarding Patient being sent home with Carvedilol but was not given an RX for it and they were wondering if you could give it to pt

## 2022-08-03 NOTE — CASE COMMUNICATION
The inpatient MAR lists the Coreg given and held according to the orders for blood pressure parameters.  During the  SOC visit the pt did not voice any dissatisfaction with your care.  He did voice dissatisfaction with the rehab facility, his current health status and being on so many meds.    ----- Message -----  From: Ganesh Benton III, M.D.  Sent: 8/2/2022   5:15 PM PDT  To: Elisa Marks R.N.      It is listed as a historica l medication in the chart.  Did the patient actually receive it in the hospital?.  If so I can refill it.    But also there was a note in the chart that the patient was unhappy with me for some reason and was transferring care to the [facility] internal medicine clinic.  Is he doing that?.    Dr. Benton  ----- Message -----  From: Elisa Marks R.N.  Sent: 8/2/2022   5:01 PM PDT  To: Les Dye R.N., *    Noted on SOC that patie nt did not have a prescription for Coreg that was listed on discharge med listed.  SN called during visit detailed message left with Dr Chetan VIRGEN voicemail.  Callback not received.  Second call made at 1630h voicemail left for MA.  Team follow up in the am 8/3/2022.  Thank you

## 2022-08-05 ENCOUNTER — HOME CARE VISIT (OUTPATIENT)
Dept: HOME HEALTH SERVICES | Facility: HOME HEALTHCARE | Age: 67
End: 2022-08-05
Payer: MEDICARE

## 2022-08-05 ENCOUNTER — TELEPHONE (OUTPATIENT)
Dept: MEDICAL GROUP | Facility: PHYSICIAN GROUP | Age: 67
End: 2022-08-05
Payer: MEDICARE

## 2022-08-05 ENCOUNTER — HOSPITAL ENCOUNTER (OUTPATIENT)
Facility: MEDICAL CENTER | Age: 67
End: 2022-08-05
Attending: INTERNAL MEDICINE
Payer: MEDICARE

## 2022-08-05 ENCOUNTER — ANTICOAGULATION MONITORING (OUTPATIENT)
Dept: MEDICAL GROUP | Facility: PHYSICIAN GROUP | Age: 67
End: 2022-08-05
Payer: MEDICARE

## 2022-08-05 VITALS
HEART RATE: 88 BPM | RESPIRATION RATE: 18 BRPM | SYSTOLIC BLOOD PRESSURE: 125 MMHG | WEIGHT: 215 LBS | DIASTOLIC BLOOD PRESSURE: 80 MMHG | TEMPERATURE: 97.4 F | BODY MASS INDEX: 25.17 KG/M2 | OXYGEN SATURATION: 98 %

## 2022-08-05 VITALS
RESPIRATION RATE: 15 BRPM | TEMPERATURE: 98.3 F | OXYGEN SATURATION: 95 % | HEART RATE: 90 BPM | SYSTOLIC BLOOD PRESSURE: 114 MMHG | DIASTOLIC BLOOD PRESSURE: 70 MMHG

## 2022-08-05 VITALS
RESPIRATION RATE: 16 BRPM | TEMPERATURE: 98.5 F | DIASTOLIC BLOOD PRESSURE: 70 MMHG | HEART RATE: 72 BPM | OXYGEN SATURATION: 95 % | SYSTOLIC BLOOD PRESSURE: 114 MMHG

## 2022-08-05 DIAGNOSIS — I82.622 ACUTE DEEP VEIN THROMBOSIS (DVT) OF LEFT UPPER EXTREMITY, UNSPECIFIED VEIN (HCC): ICD-10-CM

## 2022-08-05 DIAGNOSIS — Z86.79 HISTORY OF ATRIAL FIBRILLATION: ICD-10-CM

## 2022-08-05 LAB
ALBUMIN SERPL BCP-MCNC: 3.7 G/DL (ref 3.2–4.9)
ALBUMIN/GLOB SERPL: 1.4 G/DL
ALP SERPL-CCNC: 88 U/L (ref 30–99)
ALT SERPL-CCNC: 12 U/L (ref 2–50)
ANION GAP SERPL CALC-SCNC: 13 MMOL/L (ref 7–16)
ANISOCYTOSIS BLD QL SMEAR: ABNORMAL
AST SERPL-CCNC: 13 U/L (ref 12–45)
BASOPHILS # BLD AUTO: 0 % (ref 0–1.8)
BASOPHILS # BLD: 0 K/UL (ref 0–0.12)
BILIRUB SERPL-MCNC: 0.3 MG/DL (ref 0.1–1.5)
BUN SERPL-MCNC: 23 MG/DL (ref 8–22)
CALCIUM SERPL-MCNC: 8.5 MG/DL (ref 8.5–10.5)
CHLORIDE SERPL-SCNC: 102 MMOL/L (ref 96–112)
CO2 SERPL-SCNC: 22 MMOL/L (ref 20–33)
CREAT SERPL-MCNC: 1.13 MG/DL (ref 0.5–1.4)
EOSINOPHIL # BLD AUTO: 0 K/UL (ref 0–0.51)
EOSINOPHIL NFR BLD: 0 % (ref 0–6.9)
ERYTHROCYTE [DISTWIDTH] IN BLOOD BY AUTOMATED COUNT: 56.6 FL (ref 35.9–50)
GFR SERPLBLD CREATININE-BSD FMLA CKD-EPI: 72 ML/MIN/1.73 M 2
GLOBULIN SER CALC-MCNC: 2.6 G/DL (ref 1.9–3.5)
GLUCOSE SERPL-MCNC: 315 MG/DL (ref 65–99)
HCT VFR BLD AUTO: 32.6 % (ref 42–52)
HGB BLD-MCNC: 9.6 G/DL (ref 14–18)
INR PPP: 2.7 (ref 2–3.5)
INR PPP: 2.7 - CRITICAL LOW: < 0.8, CRITICAL HIGH: > 6.5 (ref 2–3.5)
LYMPHOCYTES # BLD AUTO: 0.4 K/UL (ref 1–4.8)
LYMPHOCYTES NFR BLD: 4.4 % (ref 22–41)
MACROCYTES BLD QL SMEAR: ABNORMAL
MANUAL DIFF BLD: NORMAL
MCH RBC QN AUTO: 26.1 PG (ref 27–33)
MCHC RBC AUTO-ENTMCNC: 29.4 G/DL (ref 33.7–35.3)
MCV RBC AUTO: 88.6 FL (ref 81.4–97.8)
METAMYELOCYTES NFR BLD MANUAL: 0.9 %
MONOCYTES # BLD AUTO: 0.32 K/UL (ref 0–0.85)
MONOCYTES NFR BLD AUTO: 3.5 % (ref 0–13.4)
MORPHOLOGY BLD-IMP: NORMAL
NEUTROPHILS # BLD AUTO: 8.3 K/UL (ref 1.82–7.42)
NEUTROPHILS NFR BLD: 91.2 % (ref 44–72)
NRBC # BLD AUTO: 0 K/UL
NRBC BLD-RTO: 0 /100 WBC
OVALOCYTES BLD QL SMEAR: NORMAL
PLATELET # BLD AUTO: 183 K/UL (ref 164–446)
PLATELET BLD QL SMEAR: NORMAL
PMV BLD AUTO: 12.5 FL (ref 9–12.9)
POIKILOCYTOSIS BLD QL SMEAR: NORMAL
POTASSIUM SERPL-SCNC: 4.4 MMOL/L (ref 3.6–5.5)
PROT SERPL-MCNC: 6.3 G/DL (ref 6–8.2)
RBC # BLD AUTO: 3.68 M/UL (ref 4.7–6.1)
RBC BLD AUTO: PRESENT
SODIUM SERPL-SCNC: 137 MMOL/L (ref 135–145)
WBC # BLD AUTO: 9.1 K/UL (ref 4.8–10.8)

## 2022-08-05 PROCEDURE — 80053 COMPREHEN METABOLIC PANEL: CPT

## 2022-08-05 PROCEDURE — A6452 HIGH COMPRES BAND W>=3"<5"YD: HCPCS

## 2022-08-05 PROCEDURE — 6650300 HCR  CLEANSER 4-IN-1

## 2022-08-05 PROCEDURE — G0299 HHS/HOSPICE OF RN EA 15 MIN: HCPCS

## 2022-08-05 PROCEDURE — A6210 FOAM DRG >16<=48 SQ IN W/O B: HCPCS

## 2022-08-05 PROCEDURE — G0151 HHCP-SERV OF PT,EA 15 MIN: HCPCS

## 2022-08-05 PROCEDURE — 85007 BL SMEAR W/DIFF WBC COUNT: CPT

## 2022-08-05 PROCEDURE — 6650331 HCR  COAGCHECK STRIPS

## 2022-08-05 PROCEDURE — 85025 COMPLETE CBC W/AUTO DIFF WBC: CPT

## 2022-08-05 ASSESSMENT — ENCOUNTER SYMPTOMS
PERSON REPORTING PAIN: PATIENT
MUSCLE WEAKNESS: 1
NAUSEA: NO
PERSON REPORTING PAIN: PATIENT
NAUSEA: DENIES
DENIES PAIN: 1
VOMITING: NO
PAIN SEVERITY GOAL: 0/10
DENIES PAIN: 1
HIGHEST PAIN SEVERITY IN PAST 24 HOURS: 0/10
LOWEST PAIN SEVERITY IN PAST 24 HOURS: 0/10
VOMITING: DENIES

## 2022-08-05 NOTE — PROGRESS NOTES
Anticoagulation Summary  As of 2022    INR goal:  2.0-3.0   TTR:  --   INR used for dosin.70 (2022)   Warfarin maintenance plan:  5 mg (5 mg x 1) every day   Weekly warfarin total:  35 mg   Plan last modified:  Nelida Flores, PharmD (8/3/2022)   Next INR check:  2022   Target end date:  Indefinite    Indications    History of atrial fibrillation [Z86.79]  Acute deep vein thrombosis (DVT) of left upper extremity (HCC) [I82.622]             Anticoagulation Episode Summary     INR check location:      Preferred lab:      Send INR reminders to:      Comments:  Switch back to Eliquis for AF if DVT is resolved after 3 months (around 10/25/22)      Anticoagulation Care Providers     Provider Role Specialty Phone number    [facility]   [phone]        Anticoagulation Patient Findings          Left voicemail message to report a therapeutic INR of 2.7.  Pt to continue with current warfarin dosing regimen. Requested pt contact the clinic for any s/s of unusual bleeding, bruising, clotting or any changes to diet or medication. FU 3 days.     Mj Ruelas, PharmD   CC  [facility]

## 2022-08-06 ENCOUNTER — DOCUMENTATION (OUTPATIENT)
Dept: VASCULAR LAB | Facility: MEDICAL CENTER | Age: 67
End: 2022-08-06
Payer: MEDICARE

## 2022-08-06 NOTE — PROGRESS NOTES
Initiial antiocag note and most recent d/c summary reviewed.  Pending further recs from pcp we will; continue with indefinite oac as recommended at d/c for afib and picc line associated dvt      Michael Bloch, MD  Anticoagulation Clinic    Cc:

## 2022-08-07 ENCOUNTER — HOME CARE VISIT (OUTPATIENT)
Dept: HOME HEALTH SERVICES | Facility: HOME HEALTHCARE | Age: 67
End: 2022-08-07
Payer: MEDICARE

## 2022-08-08 ENCOUNTER — HOSPITAL ENCOUNTER (OUTPATIENT)
Facility: MEDICAL CENTER | Age: 67
End: 2022-08-08
Attending: INTERNAL MEDICINE
Payer: MEDICARE

## 2022-08-08 ENCOUNTER — HOME CARE VISIT (OUTPATIENT)
Dept: HOME HEALTH SERVICES | Facility: HOME HEALTHCARE | Age: 67
End: 2022-08-08
Payer: MEDICARE

## 2022-08-08 ENCOUNTER — ANTICOAGULATION MONITORING (OUTPATIENT)
Dept: MEDICAL GROUP | Facility: PHYSICIAN GROUP | Age: 67
End: 2022-08-08
Payer: MEDICARE

## 2022-08-08 VITALS
DIASTOLIC BLOOD PRESSURE: 70 MMHG | TEMPERATURE: 97.1 F | RESPIRATION RATE: 18 BRPM | SYSTOLIC BLOOD PRESSURE: 110 MMHG | HEART RATE: 88 BPM | OXYGEN SATURATION: 95 %

## 2022-08-08 DIAGNOSIS — I82.622 ACUTE DEEP VEIN THROMBOSIS (DVT) OF LEFT UPPER EXTREMITY, UNSPECIFIED VEIN (HCC): ICD-10-CM

## 2022-08-08 DIAGNOSIS — Z86.79 HISTORY OF ATRIAL FIBRILLATION: ICD-10-CM

## 2022-08-08 LAB
ALBUMIN SERPL BCP-MCNC: 3.3 G/DL (ref 3.2–4.9)
ALBUMIN/GLOB SERPL: 1.3 G/DL
ALP SERPL-CCNC: 76 U/L (ref 30–99)
ALT SERPL-CCNC: 13 U/L (ref 2–50)
ANION GAP SERPL CALC-SCNC: 10 MMOL/L (ref 7–16)
AST SERPL-CCNC: 13 U/L (ref 12–45)
BASOPHILS # BLD AUTO: 0.8 % (ref 0–1.8)
BASOPHILS # BLD: 0.07 K/UL (ref 0–0.12)
BILIRUB SERPL-MCNC: 0.3 MG/DL (ref 0.1–1.5)
BUN SERPL-MCNC: 20 MG/DL (ref 8–22)
CALCIUM SERPL-MCNC: 8.7 MG/DL (ref 8.5–10.5)
CHLORIDE SERPL-SCNC: 106 MMOL/L (ref 96–112)
CO2 SERPL-SCNC: 23 MMOL/L (ref 20–33)
CREAT SERPL-MCNC: 1.18 MG/DL (ref 0.5–1.4)
EOSINOPHIL # BLD AUTO: 0.04 K/UL (ref 0–0.51)
EOSINOPHIL NFR BLD: 0.5 % (ref 0–6.9)
ERYTHROCYTE [DISTWIDTH] IN BLOOD BY AUTOMATED COUNT: 57.6 FL (ref 35.9–50)
FASTING STATUS PATIENT QL REPORTED: NORMAL
GFR SERPLBLD CREATININE-BSD FMLA CKD-EPI: 68 ML/MIN/1.73 M 2
GLOBULIN SER CALC-MCNC: 2.5 G/DL (ref 1.9–3.5)
GLUCOSE SERPL-MCNC: 271 MG/DL (ref 65–99)
HCT VFR BLD AUTO: 30.6 % (ref 42–52)
HGB BLD-MCNC: 8.8 G/DL (ref 14–18)
IMM GRANULOCYTES # BLD AUTO: 0.04 K/UL (ref 0–0.11)
IMM GRANULOCYTES NFR BLD AUTO: 0.5 % (ref 0–0.9)
INR PPP: 2.9 (ref 2–3.5)
INR PPP: 2.9 - CRITICAL LOW: < 0.8, CRITICAL HIGH: > 6.5 (ref 2–3.5)
LYMPHOCYTES # BLD AUTO: 0.45 K/UL (ref 1–4.8)
LYMPHOCYTES NFR BLD: 5.3 % (ref 22–41)
MCH RBC QN AUTO: 25.7 PG (ref 27–33)
MCHC RBC AUTO-ENTMCNC: 28.8 G/DL (ref 33.7–35.3)
MCV RBC AUTO: 89.2 FL (ref 81.4–97.8)
MONOCYTES # BLD AUTO: 0.41 K/UL (ref 0–0.85)
MONOCYTES NFR BLD AUTO: 4.8 % (ref 0–13.4)
NEUTROPHILS # BLD AUTO: 7.47 K/UL (ref 1.82–7.42)
NEUTROPHILS NFR BLD: 88.1 % (ref 44–72)
NRBC # BLD AUTO: 0 K/UL
NRBC BLD-RTO: 0 /100 WBC
PLATELET # BLD AUTO: 202 K/UL (ref 164–446)
PMV BLD AUTO: 13.1 FL (ref 9–12.9)
POTASSIUM SERPL-SCNC: 4.5 MMOL/L (ref 3.6–5.5)
PROT SERPL-MCNC: 5.8 G/DL (ref 6–8.2)
RBC # BLD AUTO: 3.43 M/UL (ref 4.7–6.1)
SODIUM SERPL-SCNC: 139 MMOL/L (ref 135–145)
WBC # BLD AUTO: 8.5 K/UL (ref 4.8–10.8)

## 2022-08-08 PROCEDURE — G0493 RN CARE EA 15 MIN HH/HOSPICE: HCPCS

## 2022-08-08 PROCEDURE — 80053 COMPREHEN METABOLIC PANEL: CPT

## 2022-08-08 PROCEDURE — 85025 COMPLETE CBC W/AUTO DIFF WBC: CPT

## 2022-08-08 ASSESSMENT — ACTIVITIES OF DAILY LIVING (ADL)
AMBULATION ASSISTANCE: 1
TRANSPORTATION: DEPENDENT
AMBULATION ASSISTANCE: INDEPENDENT
CURRENT_FUNCTION: INDEPENDENT
TRANSPORTATION ASSESSED: 1
PHYSICAL TRANSFERS ASSESSED: 1
AMBULATION_DISTANCE/DURATION_TOLERATED: 300+ FT

## 2022-08-08 ASSESSMENT — ENCOUNTER SYMPTOMS
DENIES PAIN: 1
LOWEST PAIN SEVERITY IN PAST 24 HOURS: 4/10
PAIN LOCATION: RIGHT ANKLE
SUBJECTIVE PAIN PROGRESSION: GRADUALLY IMPROVING
PERSON REPORTING PAIN: PATIENT
HIGHEST PAIN SEVERITY IN PAST 24 HOURS: 6/10
PAIN LOCATION - PAIN QUALITY: ACHE,SORE
PAIN: 1
MUSCLE WEAKNESS: 1
PAIN LOCATION - PAIN FREQUENCY: FREQUENT
VOMITING: DENIES
PERSON REPORTING PAIN: PATIENT
PAIN LOCATION - EXACERBATING FACTORS: WOUND DRESSING
NAUSEA: DENIES
PAIN LOCATION - PAIN SEVERITY: 4/10
PAIN SEVERITY GOAL: 2/10

## 2022-08-08 NOTE — PROGRESS NOTES
Anticoagulation Summary  As of 2022    INR goal:  2.0-3.0   TTR:  --   INR used for dosin.90 (2022)   Warfarin maintenance plan:  5 mg (5 mg x 1) every day   Weekly warfarin total:  35 mg   Plan last modified:  Nelida Flores PharmD (8/3/2022)   Next INR check:  8/10/2022   Target end date:  Indefinite    Indications    History of atrial fibrillation [Z86.79]  Acute deep vein thrombosis (DVT) of left upper extremity (HCC) [I82.622]             Anticoagulation Episode Summary     INR check location:      Preferred lab:      Send INR reminders to:      Comments:  [facility]  Switch back to Eliquis for AF if DVT is resolved after 3 months (around 10/25/22)      Anticoagulation Care Providers     Provider Role Specialty Phone number    [facility]   [phone]          Refer to Anticoagulation Patient Findings for HPI  Patient Findings     Negatives:  Signs/symptoms of thrombosis, Signs/symptoms of bleeding, Laboratory test error suspected, Change in health, Change in alcohol use, Change in activity, Upcoming invasive procedure, Emergency department visit, Upcoming dental procedure, Missed doses, Extra doses, Change in medications, Change in diet/appetite, Hospital admission, Bruising, Other complaints          Spoke with patient to report a therapeutic INR.      Pt instructed to continue with current warfarin dosing regimen, confirms dosing.   Will follow up in 2 days via [facility].     Germain Mullins, NeryD, BCACP

## 2022-08-09 ENCOUNTER — HOME CARE VISIT (OUTPATIENT)
Dept: HOME HEALTH SERVICES | Facility: HOME HEALTHCARE | Age: 67
End: 2022-08-09
Payer: MEDICARE

## 2022-08-09 ENCOUNTER — PATIENT OUTREACH (OUTPATIENT)
Dept: HEALTH INFORMATION MANAGEMENT | Facility: OTHER | Age: 67
End: 2022-08-09

## 2022-08-09 ENCOUNTER — OFFICE VISIT (OUTPATIENT)
Dept: MEDICAL GROUP | Facility: PHYSICIAN GROUP | Age: 67
End: 2022-08-09
Payer: MEDICARE

## 2022-08-09 VITALS
SYSTOLIC BLOOD PRESSURE: 128 MMHG | HEART RATE: 76 BPM | RESPIRATION RATE: 18 BRPM | DIASTOLIC BLOOD PRESSURE: 84 MMHG | OXYGEN SATURATION: 97 % | BODY MASS INDEX: 29.25 KG/M2 | HEIGHT: 77 IN | TEMPERATURE: 98 F | WEIGHT: 247.7 LBS

## 2022-08-09 DIAGNOSIS — E11.29 TYPE 2 DIABETES MELLITUS WITH OTHER DIABETIC KIDNEY COMPLICATION, WITHOUT LONG-TERM CURRENT USE OF INSULIN (HCC): ICD-10-CM

## 2022-08-09 DIAGNOSIS — I50.23 ACUTE ON CHRONIC SYSTOLIC HEART FAILURE (HCC): ICD-10-CM

## 2022-08-09 DIAGNOSIS — D84.9 IMMUNOSUPPRESSED STATUS (HCC): ICD-10-CM

## 2022-08-09 DIAGNOSIS — I10 DIABETES MELLITUS WITH COINCIDENT HYPERTENSION (HCC): ICD-10-CM

## 2022-08-09 DIAGNOSIS — Z09 HOSPITAL DISCHARGE FOLLOW-UP: ICD-10-CM

## 2022-08-09 DIAGNOSIS — I82.622 ACUTE DEEP VEIN THROMBOSIS (DVT) OF LEFT UPPER EXTREMITY, UNSPECIFIED VEIN (HCC): ICD-10-CM

## 2022-08-09 DIAGNOSIS — Z12.5 PROSTATE CANCER SCREENING: ICD-10-CM

## 2022-08-09 DIAGNOSIS — I71.40 ABDOMINAL AORTIC ANEURYSM (AAA) WITHOUT RUPTURE (HCC): ICD-10-CM

## 2022-08-09 DIAGNOSIS — E27.49 SECONDARY ADRENAL INSUFFICIENCY (HCC): ICD-10-CM

## 2022-08-09 DIAGNOSIS — E11.9 DIABETES MELLITUS WITH COINCIDENT HYPERTENSION (HCC): ICD-10-CM

## 2022-08-09 DIAGNOSIS — E78.5 HYPERLIPIDEMIA, UNSPECIFIED HYPERLIPIDEMIA TYPE: ICD-10-CM

## 2022-08-09 PROBLEM — E87.5 HYPERKALEMIA: Status: RESOLVED | Noted: 2022-06-10 | Resolved: 2022-08-09

## 2022-08-09 PROBLEM — J40 BRONCHITIS: Status: RESOLVED | Noted: 2022-01-17 | Resolved: 2022-08-09

## 2022-08-09 PROBLEM — N17.9 AKI (ACUTE KIDNEY INJURY) (HCC): Status: RESOLVED | Noted: 2017-09-27 | Resolved: 2022-08-09

## 2022-08-09 PROBLEM — U07.1 COVID-19: Status: RESOLVED | Noted: 2020-11-13 | Resolved: 2022-08-09

## 2022-08-09 PROBLEM — M25.551 RIGHT HIP PAIN: Status: RESOLVED | Noted: 2021-01-20 | Resolved: 2022-08-09

## 2022-08-09 PROBLEM — M25.461 KNEE EFFUSION, RIGHT: Status: RESOLVED | Noted: 2022-05-30 | Resolved: 2022-08-09

## 2022-08-09 PROBLEM — A41.9 SEPSIS (HCC): Status: RESOLVED | Noted: 2017-09-27 | Resolved: 2022-08-09

## 2022-08-09 PROBLEM — Z71.89 ADVANCED CARE PLANNING/COUNSELING DISCUSSION: Status: RESOLVED | Noted: 2021-12-16 | Resolved: 2022-08-09

## 2022-08-09 PROBLEM — R10.11 RIGHT UPPER QUADRANT ABDOMINAL PAIN: Status: RESOLVED | Noted: 2021-10-28 | Resolved: 2022-08-09

## 2022-08-09 PROBLEM — G93.41 ACUTE METABOLIC ENCEPHALOPATHY: Status: RESOLVED | Noted: 2022-05-30 | Resolved: 2022-08-09

## 2022-08-09 PROCEDURE — 99495 TRANSJ CARE MGMT MOD F2F 14D: CPT | Performed by: FAMILY MEDICINE

## 2022-08-09 RX ORDER — MYCOPHENOLATE MOFETIL 250 MG/1
CAPSULE ORAL
COMMUNITY
Start: 2022-08-01 | End: 2023-03-10

## 2022-08-09 ASSESSMENT — FIBROSIS 4 INDEX: FIB4 SCORE: 1.16

## 2022-08-09 NOTE — PROGRESS NOTES
Jama is in office today to see PCP Ganesh Benton, attempted to see Jama while in office for Personal Care Management and CCM services. Jama denied wanting to speak to CCM RN and doesn't want to be in another program.. He has Home Health at this time. Will deny program at this time!

## 2022-08-09 NOTE — ASSESSMENT & PLAN NOTE
Patient is here today for follow-up.  He has had a couple hospitalizations recently for urinary tract infection and sepsis.  The last one was just a little over a week ago.  After being treated with IV antibiotics he is just started a 2-week course of Cipro.  He is due to see the infectious disease expert soon.  They did not find an etiology for his recurrent infections.  Patient states they were thinking about sending him to urology but decided not to.  Today he is feeling good except he still weak in his legs and working to get stronger.

## 2022-08-09 NOTE — ASSESSMENT & PLAN NOTE
This is a new problem that occurred during recent hospitalization.  He was on Eliquis at that time so we changed him to Coumadin.  He has been followed at the Coumadin clinic.  Plan is for 3 months of anticoagulation.

## 2022-08-09 NOTE — LETTER
[facility]  Ganesh Benton III, M.D.  [address]  [place] [address]  Fax: [phone]   Authorization for Release/Disclosure of   Protected Health Information   Name: CIARA FORRESTER : 1955 SSN: [id number]   Address:  [address] [place] [address] Phone:    [phone] (home)    I authorize the entity listed below to release/disclose the PHI below to:   [facility]/Ganesh Benton III, M.D. and Ganesh Benton III, M.D.   Provider or Entity Name:  [facility]   Address   [facility], [facility], [place]  [address] [place] [address] Phone:  [phone]    Fax:  [phone]   Reason for request: continuity of care   Information to be released:    [  ] LAST COLONOSCOPY,  including any PATH REPORT and follow-up  [  ] LAST FIT/COLOGUARD RESULT [  ] LAST DEXA  [  ] LAST MAMMOGRAM  [  ] LAST PAP  [  ] LAST LABS [  ] RETINA EXAM REPORT  [  ] IMMUNIZATION RECORDS  [ XXX ] Release all info      [  ] Check here and initial the line next to each item to release ALL health information INCLUDING  _____ Care and treatment for drug and / or alcohol abuse  _____ HIV testing, infection status, or AIDS  _____ Genetic Testing    DATES OF SERVICE OR TIME PERIOD TO BE DISCLOSED: _____________  I understand and acknowledge that:  * This Authorization may be revoked at any time by you in writing, except if your health information has already been used or disclosed.  * Your health information that will be used or disclosed as a result of you signing this authorization could be re-disclosed by the recipient. If this occurs, your re-disclosed health information may no longer be protected by State or Federal laws.  * You may refuse to sign this Authorization. Your refusal will not affect your ability to obtain treatment.  * This Authorization becomes effective upon signing and will  on (date) __________.      If no date is indicated, this Authorization will  one (1) year from the  signature date.    Name: Jama Altman    Signature:   Date:     8/9/2022       PLEASE FAX REQUESTED RECORDS BACK TO: [phone]

## 2022-08-09 NOTE — ASSESSMENT & PLAN NOTE
This is a chronic problem.  Patient states he just got an appointment to see the cardiologist is working on issues.  No records available as of yet.

## 2022-08-09 NOTE — ASSESSMENT & PLAN NOTE
This is a chronic problem.  His hemoglobin A1c in the hospital was very good at 6.2%.  We will recheck it again in 3 months.  He does state that he has had his eye exam and records requested.

## 2022-08-09 NOTE — CASE COMMUNICATION
Quality Review for 8.2.22 SOC OASIS performed on by FREDI Dixon RN on 8.9.2022:    Edits completed by FREDI Dixon RN:  1. Added high risk medications, pressure ulcer prevention and fall risk to safety measures  2.  Updated F2F data  3. Clicked on high risk medication education performed on care plan per yes response on   4. Added triage code

## 2022-08-09 NOTE — ASSESSMENT & PLAN NOTE
This is a chronic problem.  He has not had his lipids done in over a year.  Lab will be ordered for him to do with his next set of routine labs.

## 2022-08-09 NOTE — ASSESSMENT & PLAN NOTE
This is a new finding.  It was found on recent CT scan of the abdomen.  He has a 3.0 infrarenal abdominal aneurysm.  No symptoms.

## 2022-08-10 ENCOUNTER — HOME CARE VISIT (OUTPATIENT)
Dept: HOME HEALTH SERVICES | Facility: HOME HEALTHCARE | Age: 67
End: 2022-08-10
Payer: MEDICARE

## 2022-08-10 VITALS
DIASTOLIC BLOOD PRESSURE: 72 MMHG | TEMPERATURE: 97.5 F | HEART RATE: 78 BPM | BODY MASS INDEX: 26.33 KG/M2 | RESPIRATION RATE: 16 BRPM | WEIGHT: 222 LBS | OXYGEN SATURATION: 96 % | SYSTOLIC BLOOD PRESSURE: 119 MMHG

## 2022-08-10 DIAGNOSIS — Z86.79 HISTORY OF ATRIAL FIBRILLATION: ICD-10-CM

## 2022-08-10 LAB — INR PPP: NORMAL (ref 2–3.5)

## 2022-08-10 PROCEDURE — G0299 HHS/HOSPICE OF RN EA 15 MIN: HCPCS

## 2022-08-10 ASSESSMENT — ENCOUNTER SYMPTOMS
PERSON REPORTING PAIN: PATIENT
VOMITING: PT DENIES ANY EMESIS AT THIS TIME
DENIES PAIN: 1
NAUSEA: PT DENIES ANY NAUSEA AT THIS TIME

## 2022-08-10 ASSESSMENT — FIBROSIS 4 INDEX: FIB4 SCORE: 1.16

## 2022-08-10 NOTE — PROGRESS NOTES
Received message from [facility] team to move INR draw to 8/12/22  Nelida Flores, PharmD

## 2022-08-11 RX ORDER — PIOGLITAZONEHYDROCHLORIDE 45 MG/1
45 TABLET ORAL DAILY
Qty: 100 TABLET | Refills: 3 | Status: SHIPPED | OUTPATIENT
Start: 2022-08-11 | End: 2023-09-19

## 2022-08-11 NOTE — CASE COMMUNICATION
Reviewed and approved BENEDICT Marks RN  ----- Message -----  From: Ganesh Benton III, M.D.  Sent: 8/3/2022   4:40 PM PDT  To: Elisa Marks R.N.  Subject: RE:                                                Okay good to know that is all straightened out.  His prescription has been sent.  Dr. Benton  ----- Message -----  From: Elisa Marks R.N.  Sent: 8/3/2022   3:51 PM PDT  To: Ganesh Benton III, M.D.  Subject: RE:                                               The inpatient MAR lists the Coreg given and held according to the orders for blood pressure parameters.  During the  SOC visit the pt did not voice any dissatisfaction with your care.  He did voice dissatisfaction with the rehab facility, his current health status and being on so many meds.    ----- Message -----  From: Ganesh Benton III, M.D.  Sent: 8/2/2022   5:15 PM PDT  To: Elisa Marks R.N.       It is listed as a historical medication in the chart.  Did the patient actually receive it in the hospital?.  If so I can refill it.    But also there was a note in the chart that the patient was unhappy with me for some reason and was transferring care to the [facility] internal medicine clinic.  Is he doing that?.    Dr. Benton  ----- Message -----  From: Elisa Marks R.N.  Sent: 8/2/2022   5:01 PM PDT  To: MEDINA Baca, *    Noted on SOC that patient did not have a prescription for Coreg that was listed on discharge med listed.  SN called during visit detailed message left with Dr Chetan VIRGEN voicemail.  Callback not received.  Second call made at 1630h voicemail left for MA.  Team follow up in the am 8/3/2022.  Thank you

## 2022-08-11 NOTE — CASE COMMUNICATION
Reviewed and approved of changes BENEDICT Marks RN  ----- Message -----  From: Katy Dixon R.N.  Sent: 8/9/2022   4:02 PM PDT  To: Elisa Marks R.N.      Quality Review for 8.2.22 SOC OASIS performed on by FREDI Dixon RN on 8.9.2022:    Edits completed by FREDI Dixon RN:  1. Added high risk medications, pressure ulcer prevention and fall risk to safety measures  2.  Updated F2F data  3. Clicked on high risk medication education perform ed on care plan per yes response on   4. Added triage code

## 2022-08-12 ENCOUNTER — HOME CARE VISIT (OUTPATIENT)
Dept: HOME HEALTH SERVICES | Facility: HOME HEALTHCARE | Age: 67
End: 2022-08-12
Payer: MEDICARE

## 2022-08-12 ENCOUNTER — ANTICOAGULATION MONITORING (OUTPATIENT)
Dept: MEDICAL GROUP | Facility: PHYSICIAN GROUP | Age: 67
End: 2022-08-12

## 2022-08-12 VITALS
SYSTOLIC BLOOD PRESSURE: 136 MMHG | RESPIRATION RATE: 16 BRPM | TEMPERATURE: 98 F | HEART RATE: 84 BPM | DIASTOLIC BLOOD PRESSURE: 85 MMHG | OXYGEN SATURATION: 98 %

## 2022-08-12 DIAGNOSIS — I82.622 ACUTE DEEP VEIN THROMBOSIS (DVT) OF LEFT UPPER EXTREMITY, UNSPECIFIED VEIN (HCC): ICD-10-CM

## 2022-08-12 DIAGNOSIS — Z86.79 HISTORY OF ATRIAL FIBRILLATION: ICD-10-CM

## 2022-08-12 LAB
INR PPP: 3.3 (ref 2–3.5)
INR PPP: 3.3 - CRITICAL LOW: < 0.8, CRITICAL HIGH: > 6.5 (ref 2–3.5)

## 2022-08-12 PROCEDURE — G0299 HHS/HOSPICE OF RN EA 15 MIN: HCPCS

## 2022-08-12 PROCEDURE — G0180 MD CERTIFICATION HHA PATIENT: HCPCS | Performed by: FAMILY MEDICINE

## 2022-08-12 ASSESSMENT — ENCOUNTER SYMPTOMS
PAIN LOCATION - RELIEVING FACTORS: REST
VOMITING: PT DENIES ANY EMESIS AT THIS TIME
PAIN LOCATION: RIGHT KNEE
PAIN LOCATION - PAIN FREQUENCY: FREQUENT
MUSCLE WEAKNESS: 1
PAIN: 1
PERSON REPORTING PAIN: PATIENT
PAIN LOCATION - PAIN SEVERITY: 5/10
LOWEST PAIN SEVERITY IN PAST 24 HOURS: 0/10
PAIN LOCATION - EXACERBATING FACTORS: ACTIVITY
PAIN LOCATION - PAIN QUALITY: ACHY
NAUSEA: PT DENIES ANY NAUSEA AT THIS TIME
HIGHEST PAIN SEVERITY IN PAST 24 HOURS: 5/10
SUBJECTIVE PAIN PROGRESSION: WAXING AND WANING

## 2022-08-12 NOTE — PROGRESS NOTES
Anticoagulation Summary  As of 8/12/2022      INR goal:  2.0-3.0   TTR:  --   INR used for dosing:  3.30 (8/12/2022)   Warfarin maintenance plan:  2.5 mg (5 mg x 0.5) every Mon; 5 mg (5 mg x 1) all other days   Weekly warfarin total:  32.5 mg   Plan last modified:  Mj Ruelas, PharmD (8/12/2022)   Next INR check:  8/15/2022   Target end date:  Indefinite    Indications    History of atrial fibrillation [Z86.79]  Acute deep vein thrombosis (DVT) of left upper extremity (HCC) [I82.622]                 Anticoagulation Episode Summary       INR check location:      Preferred lab:      Send INR reminders to:      Comments:  [facility]  Switch back to Eliquis for AF if DVT is resolved after 3 months (around 10/25/22)          Anticoagulation Care Providers       Provider Role Specialty Phone number    [facility]   [phone]          Anticoagulation Patient Findings          HPI:  Jama Altmna, on anticoagulation therapy with warfarin for Fib.   Changes to current medical/health status since last appt: none  Denies signs/symptoms of bleeding and/or thrombosis since the last appt.    Denies any interval changes to diet  Denies any interval changes to medications since last appt.   Denies any complications or cost restrictions with current therapy.     A/P   INR  SUPRA-therapeutic.   Reduce today then begin 7% reduced regimen.     Next INR in 3 days.     Mj Ruelas, PharmD

## 2022-08-16 ENCOUNTER — TELEPHONE (OUTPATIENT)
Dept: VASCULAR LAB | Facility: MEDICAL CENTER | Age: 67
End: 2022-08-16
Payer: MEDICARE

## 2022-08-16 ENCOUNTER — HOME CARE VISIT (OUTPATIENT)
Dept: HOME HEALTH SERVICES | Facility: HOME HEALTHCARE | Age: 67
End: 2022-08-16
Payer: MEDICARE

## 2022-08-16 VITALS
WEIGHT: 225 LBS | RESPIRATION RATE: 16 BRPM | OXYGEN SATURATION: 98 % | DIASTOLIC BLOOD PRESSURE: 66 MMHG | TEMPERATURE: 97.5 F | HEART RATE: 81 BPM | BODY MASS INDEX: 26.68 KG/M2 | SYSTOLIC BLOOD PRESSURE: 106 MMHG

## 2022-08-16 DIAGNOSIS — Z86.79 HISTORY OF ATRIAL FIBRILLATION: Primary | ICD-10-CM

## 2022-08-16 LAB
INR PPP: 2.6 (ref 2–3.5)
INR PPP: 2.6 - CRITICAL LOW: < 0.8, CRITICAL HIGH: > 6.5 (ref 2–3.5)

## 2022-08-16 PROCEDURE — G0299 HHS/HOSPICE OF RN EA 15 MIN: HCPCS

## 2022-08-16 ASSESSMENT — FIBROSIS 4 INDEX: FIB4 SCORE: 1.16

## 2022-08-16 ASSESSMENT — ENCOUNTER SYMPTOMS
HIGHEST PAIN SEVERITY IN PAST 24 HOURS: 4/10
PAIN LOCATION - PAIN QUALITY: ACHING
PAIN LOCATION - PAIN SEVERITY: 4/10
PAIN LOCATION: BILATERAL KNEES
SUBJECTIVE PAIN PROGRESSION: WAXING AND WANING
PAIN LOCATION - RELIEVING FACTORS: REST
PAIN LOCATION - PAIN DURATION: CHRONIC
PERSON REPORTING PAIN: PATIENT
PAIN LOCATION - PAIN FREQUENCY: FREQUENT
PAIN: 1

## 2022-08-16 NOTE — TELEPHONE ENCOUNTER
----- Message from Jasmine Pedraza R.N. sent at 8/16/2022 11:47 AM PDT -----  Regarding: Order request  Hi!     Please send an INR order for today for this patient. The visit had been scheduled on Monday with the INR instructions attached, but the  decreased his frequency and missed the INR order that was attached so the visit yesterday was cancelled and we just found it today. We have a nurse there now who could do it.     Thanks in advance!    Jasmine

## 2022-08-17 ENCOUNTER — ANTICOAGULATION MONITORING (OUTPATIENT)
Dept: VASCULAR LAB | Facility: MEDICAL CENTER | Age: 67
End: 2022-08-17
Payer: MEDICARE

## 2022-08-17 DIAGNOSIS — Z86.79 HISTORY OF ATRIAL FIBRILLATION: Primary | ICD-10-CM

## 2022-08-17 NOTE — PROGRESS NOTES
Anticoagulation Summary  As of 2022      INR goal:  2.0-3.0   TTR:  72.0 % (3 d)   INR used for dosin.60 (2022)   Warfarin maintenance plan:  2.5 mg (5 mg x 0.5) every Mon; 5 mg (5 mg x 1) all other days   Weekly warfarin total:  32.5 mg   Plan last modified:  Nery MannD (2022)   Next INR check:  2022   Target end date:  Indefinite    Indications    History of atrial fibrillation [Z86.79]  Acute deep vein thrombosis (DVT) of left upper extremity (HCC) [I82.622]                 Anticoagulation Episode Summary       INR check location:      Preferred lab:      Send INR reminders to:      Comments:  [facility]  Switch back to Eliquis for AF if DVT is resolved after 3 months (around 10/25/22)          Anticoagulation Care Providers       Provider Role Specialty Phone number    [facility]   [phone]            Refer to Anticoagulation Patient Findings for HPI  Patient Findings       Negatives:  Signs/symptoms of thrombosis, Signs/symptoms of bleeding, Laboratory test error suspected, Change in health, Change in alcohol use, Change in activity, Upcoming invasive procedure, Emergency department visit, Upcoming dental procedure, Missed doses, Extra doses, Change in medications, Change in diet/appetite, Hospital admission, Bruising, Other complaints            Spoke with patient to report a therapeutic INR.      Pt is NOT on antiplatelet therapy     Pt instructed to continue with current warfarin dosing regimen, confirms dosing.   Will follow up in 1 week(s).     Ramonita Middleton PharmD

## 2022-08-17 NOTE — TELEPHONE ENCOUNTER
Received request via: Pharmacy    Was the patient seen in the last year in this department? Yes    Does the patient have an active prescription (recently filled or refills available) for medication(s) requested? Yes. Insurance pays for 100 day supply

## 2022-08-19 ENCOUNTER — HOME CARE VISIT (OUTPATIENT)
Dept: HOME HEALTH SERVICES | Facility: HOME HEALTHCARE | Age: 67
End: 2022-08-19
Payer: MEDICARE

## 2022-08-19 VITALS
OXYGEN SATURATION: 99 % | DIASTOLIC BLOOD PRESSURE: 58 MMHG | RESPIRATION RATE: 16 BRPM | BODY MASS INDEX: 26.68 KG/M2 | WEIGHT: 225 LBS | SYSTOLIC BLOOD PRESSURE: 118 MMHG | TEMPERATURE: 97.2 F | HEART RATE: 66 BPM

## 2022-08-19 PROCEDURE — G0299 HHS/HOSPICE OF RN EA 15 MIN: HCPCS

## 2022-08-19 ASSESSMENT — ENCOUNTER SYMPTOMS
PAIN LOCATION - RELIEVING FACTORS: REST
HIGHEST PAIN SEVERITY IN PAST 24 HOURS: 4/10
PAIN: 1
PAIN LOCATION - PAIN QUALITY: ACHING
PERSON REPORTING PAIN: PATIENT
PAIN LOCATION: RIGHT KNEE
PAIN LOCATION - PAIN FREQUENCY: FREQUENT
PAIN LOCATION - PAIN SEVERITY: 4/10
PAIN LOCATION - PAIN DURATION: CHRONIC
LOWEST PAIN SEVERITY IN PAST 24 HOURS: 3/10

## 2022-08-19 ASSESSMENT — FIBROSIS 4 INDEX: FIB4 SCORE: 1.16

## 2022-08-23 ENCOUNTER — HOME CARE VISIT (OUTPATIENT)
Dept: HOME HEALTH SERVICES | Facility: HOME HEALTHCARE | Age: 67
End: 2022-08-23
Payer: MEDICARE

## 2022-08-23 ENCOUNTER — ANTICOAGULATION MONITORING (OUTPATIENT)
Dept: MEDICAL GROUP | Facility: PHYSICIAN GROUP | Age: 67
End: 2022-08-23
Payer: MEDICARE

## 2022-08-23 DIAGNOSIS — Z86.79 HISTORY OF ATRIAL FIBRILLATION: ICD-10-CM

## 2022-08-23 DIAGNOSIS — I82.622 ACUTE DEEP VEIN THROMBOSIS (DVT) OF LEFT UPPER EXTREMITY, UNSPECIFIED VEIN (HCC): ICD-10-CM

## 2022-08-23 LAB
INR PPP: 3.1 (ref 2–3.5)
INR PPP: 3.1 - CRITICAL LOW: < 0.8, CRITICAL HIGH: > 6.5 (ref 2–3.5)

## 2022-08-23 PROCEDURE — G0299 HHS/HOSPICE OF RN EA 15 MIN: HCPCS

## 2022-08-23 NOTE — PROGRESS NOTES
OP   Telephone Anticoagulation Service Note      Anticoagulation Summary  As of 8/23/2022      INR goal:  2.0-3.0   TTR:  77.8 % (1.4 wk)   INR used for dosing:  3.10 (8/23/2022)   Warfarin maintenance plan:  2.5 mg (5 mg x 0.5) every Mon, Thu; 5 mg (5 mg x 1) all other days   Weekly warfarin total:  30 mg   Plan last modified:  Monica Beltran (8/23/2022)   Next INR check:  8/30/2022   Target end date:  Indefinite    Indications    History of atrial fibrillation [Z86.79]  Acute deep vein thrombosis (DVT) of left upper extremity (HCC) [I82.622]                 Anticoagulation Episode Summary       INR check location:      Preferred lab:      Send INR reminders to:      Comments:  [facility]  Switch back to Eliquis for AF if DVT is resolved after 3 months (around 10/25/22)          Anticoagulation Care Providers       Provider Role Specialty Phone number    [facility]   [phone]          Anticoagulation Patient Findings    Left a message on the patient's voicemail today, informing the patient of a slightly SUPRA-therapeutic INR of 3.1.   Instructed the patient to call the clinic with any changes to diet or medications, with any signs/sx of bleeding or clotting or with any questions or concerns.     Patient instructed to begin reduced weekly regimen of 2.5mg on Mon and Thurs and 5mg ROW.   Patient asked to retest again in 1 week.   Orders sent to [facility].     Jeromy GabrielD

## 2022-08-25 VITALS
HEART RATE: 96 BPM | TEMPERATURE: 97.6 F | OXYGEN SATURATION: 97 % | SYSTOLIC BLOOD PRESSURE: 100 MMHG | DIASTOLIC BLOOD PRESSURE: 64 MMHG | RESPIRATION RATE: 17 BRPM

## 2022-08-25 ASSESSMENT — PAIN SCALES - PAIN ASSESSMENT IN ADVANCED DEMENTIA (PAINAD)
TOTALSCORE: 0
BODYLANGUAGE: 0 - RELAXED.
CONSOLABILITY: 0 - NO NEED TO CONSOLE.
NEGVOCALIZATION: 0 - NONE.
FACIALEXPRESSION: 0 - SMILING OR INEXPRESSIVE.

## 2022-08-25 ASSESSMENT — ENCOUNTER SYMPTOMS
PERSON REPORTING PAIN: PATIENT
MUSCLE WEAKNESS: 1
DENIES PAIN: 1

## 2022-08-26 ENCOUNTER — HOME CARE VISIT (OUTPATIENT)
Dept: HOME HEALTH SERVICES | Facility: HOME HEALTHCARE | Age: 67
End: 2022-08-26
Payer: MEDICARE

## 2022-08-26 VITALS
RESPIRATION RATE: 17 BRPM | OXYGEN SATURATION: 98 % | BODY MASS INDEX: 27.57 KG/M2 | HEART RATE: 73 BPM | DIASTOLIC BLOOD PRESSURE: 82 MMHG | WEIGHT: 232.5 LBS | SYSTOLIC BLOOD PRESSURE: 118 MMHG | TEMPERATURE: 98 F

## 2022-08-26 PROCEDURE — G0299 HHS/HOSPICE OF RN EA 15 MIN: HCPCS

## 2022-08-26 ASSESSMENT — ENCOUNTER SYMPTOMS
PAIN LOCATION: LEFT SHOULDER
PAIN LOCATION - RELIEVING FACTORS: REST
PAIN LOCATION - PAIN SEVERITY: 2/10
PAIN SEVERITY GOAL: 0/10
SUBJECTIVE PAIN PROGRESSION: GRADUALLY IMPROVING
PAIN LOCATION - PAIN FREQUENCY: CONSTANT
PAIN: 1
PERSON REPORTING PAIN: PATIENT
HIGHEST PAIN SEVERITY IN PAST 24 HOURS: 2/10
LOWEST PAIN SEVERITY IN PAST 24 HOURS: 0/10

## 2022-08-26 ASSESSMENT — FIBROSIS 4 INDEX: FIB4 SCORE: 1.16

## 2022-08-30 ENCOUNTER — HOME CARE VISIT (OUTPATIENT)
Dept: HOME HEALTH SERVICES | Facility: HOME HEALTHCARE | Age: 67
End: 2022-08-30
Payer: MEDICARE

## 2022-08-30 LAB — INR PPP: 2.2 - CRITICAL LOW: < 0.8, CRITICAL HIGH: > 6.5 (ref 2–3.5)

## 2022-08-30 PROCEDURE — G0299 HHS/HOSPICE OF RN EA 15 MIN: HCPCS

## 2022-08-31 ENCOUNTER — ANTICOAGULATION MONITORING (OUTPATIENT)
Dept: MEDICAL GROUP | Facility: PHYSICIAN GROUP | Age: 67
End: 2022-08-31
Payer: MEDICARE

## 2022-08-31 ENCOUNTER — HOSPITAL ENCOUNTER (OUTPATIENT)
Dept: LAB | Facility: MEDICAL CENTER | Age: 67
End: 2022-08-31
Attending: INTERNAL MEDICINE
Payer: MEDICARE

## 2022-08-31 VITALS
HEART RATE: 85 BPM | TEMPERATURE: 97.4 F | RESPIRATION RATE: 16 BRPM | OXYGEN SATURATION: 94 % | SYSTOLIC BLOOD PRESSURE: 128 MMHG | DIASTOLIC BLOOD PRESSURE: 74 MMHG

## 2022-08-31 DIAGNOSIS — I82.622 ACUTE DEEP VEIN THROMBOSIS (DVT) OF LEFT UPPER EXTREMITY, UNSPECIFIED VEIN (HCC): ICD-10-CM

## 2022-08-31 DIAGNOSIS — Z86.79 HISTORY OF ATRIAL FIBRILLATION: ICD-10-CM

## 2022-08-31 LAB
25(OH)D3 SERPL-MCNC: 29 NG/ML (ref 30–100)
ALBUMIN SERPL BCP-MCNC: 3.7 G/DL (ref 3.2–4.9)
ALBUMIN/GLOB SERPL: 1.5 G/DL
ALP SERPL-CCNC: 64 U/L (ref 30–99)
ALT SERPL-CCNC: 15 U/L (ref 2–50)
ANION GAP SERPL CALC-SCNC: 10 MMOL/L (ref 7–16)
ANISOCYTOSIS BLD QL SMEAR: ABNORMAL
APPEARANCE UR: CLEAR
AST SERPL-CCNC: 14 U/L (ref 12–45)
BACTERIA #/AREA URNS HPF: NEGATIVE /HPF
BASOPHILS # BLD AUTO: 0.5 % (ref 0–1.8)
BASOPHILS # BLD: 0.03 K/UL (ref 0–0.12)
BILIRUB SERPL-MCNC: 0.5 MG/DL (ref 0.1–1.5)
BILIRUB UR QL STRIP.AUTO: NEGATIVE
BUN SERPL-MCNC: 37 MG/DL (ref 8–22)
BURR CELLS BLD QL SMEAR: NORMAL
CALCIUM SERPL-MCNC: 9.1 MG/DL (ref 8.5–10.5)
CHLORIDE SERPL-SCNC: 105 MMOL/L (ref 96–112)
CO2 SERPL-SCNC: 22 MMOL/L (ref 20–33)
COLOR UR: YELLOW
COMMENT 1642: NORMAL
CREAT SERPL-MCNC: 1.86 MG/DL (ref 0.5–1.4)
CREAT UR-MCNC: 53.94 MG/DL
EOSINOPHIL # BLD AUTO: 0.08 K/UL (ref 0–0.51)
EOSINOPHIL NFR BLD: 1.4 % (ref 0–6.9)
EPI CELLS #/AREA URNS HPF: NEGATIVE /HPF
ERYTHROCYTE [DISTWIDTH] IN BLOOD BY AUTOMATED COUNT: 62.3 FL (ref 35.9–50)
EST. AVERAGE GLUCOSE BLD GHB EST-MCNC: 128 MG/DL
FERRITIN SERPL-MCNC: 534 NG/ML (ref 22–322)
GFR SERPLBLD CREATININE-BSD FMLA CKD-EPI: 39 ML/MIN/1.73 M 2
GLOBULIN SER CALC-MCNC: 2.4 G/DL (ref 1.9–3.5)
GLUCOSE SERPL-MCNC: 72 MG/DL (ref 65–99)
GLUCOSE UR STRIP.AUTO-MCNC: NEGATIVE MG/DL
HBA1C MFR BLD: 6.1 % (ref 4–5.6)
HCT VFR BLD AUTO: 36.1 % (ref 42–52)
HGB BLD-MCNC: 10.7 G/DL (ref 14–18)
HYALINE CASTS #/AREA URNS LPF: ABNORMAL /LPF
IMM GRANULOCYTES # BLD AUTO: 0.03 K/UL (ref 0–0.11)
IMM GRANULOCYTES NFR BLD AUTO: 0.5 % (ref 0–0.9)
INR PPP: 2.2 (ref 2–3.5)
IRON SATN MFR SERPL: 22 % (ref 15–55)
IRON SERPL-MCNC: 44 UG/DL (ref 50–180)
KETONES UR STRIP.AUTO-MCNC: NEGATIVE MG/DL
LEUKOCYTE ESTERASE UR QL STRIP.AUTO: ABNORMAL
LYMPHOCYTES # BLD AUTO: 1.08 K/UL (ref 1–4.8)
LYMPHOCYTES NFR BLD: 19.5 % (ref 22–41)
MACROCYTES BLD QL SMEAR: ABNORMAL
MAGNESIUM SERPL-MCNC: 1.4 MG/DL (ref 1.5–2.5)
MCH RBC QN AUTO: 26.2 PG (ref 27–33)
MCHC RBC AUTO-ENTMCNC: 29.6 G/DL (ref 33.7–35.3)
MCV RBC AUTO: 88.3 FL (ref 81.4–97.8)
MICRO URNS: ABNORMAL
MICROCYTES BLD QL SMEAR: ABNORMAL
MONOCYTES # BLD AUTO: 0.58 K/UL (ref 0–0.85)
MONOCYTES NFR BLD AUTO: 10.5 % (ref 0–13.4)
MORPHOLOGY BLD-IMP: NORMAL
NEUTROPHILS # BLD AUTO: 3.75 K/UL (ref 1.82–7.42)
NEUTROPHILS NFR BLD: 67.6 % (ref 44–72)
NITRITE UR QL STRIP.AUTO: NEGATIVE
NRBC # BLD AUTO: 0 K/UL
NRBC BLD-RTO: 0 /100 WBC
OVALOCYTES BLD QL SMEAR: NORMAL
PH UR STRIP.AUTO: 5.5 [PH] (ref 5–8)
PHOSPHATE SERPL-MCNC: 4.1 MG/DL (ref 2.5–4.5)
PLATELET # BLD AUTO: 153 K/UL (ref 164–446)
PLATELET BLD QL SMEAR: NORMAL
PMV BLD AUTO: 11.8 FL (ref 9–12.9)
POIKILOCYTOSIS BLD QL SMEAR: NORMAL
POLYCHROMASIA BLD QL SMEAR: NORMAL
POTASSIUM SERPL-SCNC: 4.7 MMOL/L (ref 3.6–5.5)
PROT SERPL-MCNC: 6.1 G/DL (ref 6–8.2)
PROT UR QL STRIP: NEGATIVE MG/DL
PROT UR-MCNC: 13 MG/DL (ref 0–15)
PROT/CREAT UR: 241 MG/G (ref 15–68)
RBC # BLD AUTO: 4.09 M/UL (ref 4.7–6.1)
RBC # URNS HPF: ABNORMAL /HPF
RBC BLD AUTO: PRESENT
RBC UR QL AUTO: NEGATIVE
SODIUM SERPL-SCNC: 137 MMOL/L (ref 135–145)
SP GR UR STRIP.AUTO: 1.01
TIBC SERPL-MCNC: 204 UG/DL (ref 250–450)
UIBC SERPL-MCNC: 160 UG/DL (ref 110–370)
URATE SERPL-MCNC: 7.9 MG/DL (ref 2.5–8.3)
UROBILINOGEN UR STRIP.AUTO-MCNC: 0.2 MG/DL
WBC # BLD AUTO: 5.6 K/UL (ref 4.8–10.8)
WBC #/AREA URNS HPF: ABNORMAL /HPF

## 2022-08-31 PROCEDURE — 84550 ASSAY OF BLOOD/URIC ACID: CPT

## 2022-08-31 PROCEDURE — 82570 ASSAY OF URINE CREATININE: CPT

## 2022-08-31 PROCEDURE — 82728 ASSAY OF FERRITIN: CPT

## 2022-08-31 PROCEDURE — 83540 ASSAY OF IRON: CPT

## 2022-08-31 PROCEDURE — 83735 ASSAY OF MAGNESIUM: CPT

## 2022-08-31 PROCEDURE — 83550 IRON BINDING TEST: CPT

## 2022-08-31 PROCEDURE — 83036 HEMOGLOBIN GLYCOSYLATED A1C: CPT

## 2022-08-31 PROCEDURE — 36415 COLL VENOUS BLD VENIPUNCTURE: CPT

## 2022-08-31 PROCEDURE — 80053 COMPREHEN METABOLIC PANEL: CPT

## 2022-08-31 PROCEDURE — 80197 ASSAY OF TACROLIMUS: CPT

## 2022-08-31 PROCEDURE — 81001 URINALYSIS AUTO W/SCOPE: CPT

## 2022-08-31 PROCEDURE — 84100 ASSAY OF PHOSPHORUS: CPT

## 2022-08-31 PROCEDURE — 84156 ASSAY OF PROTEIN URINE: CPT

## 2022-08-31 PROCEDURE — 85025 COMPLETE CBC W/AUTO DIFF WBC: CPT

## 2022-08-31 PROCEDURE — 82306 VITAMIN D 25 HYDROXY: CPT

## 2022-08-31 ASSESSMENT — ENCOUNTER SYMPTOMS
DENIES PAIN: 1
NAUSEA: DENIES
PAIN: PT CLAIMS NO PAIN TODAY.
VOMITING: DENIES
LOWEST PAIN SEVERITY IN PAST 24 HOURS: 0/10
PAIN SEVERITY GOAL: 0/10
PERSON REPORTING PAIN: PATIENT
HIGHEST PAIN SEVERITY IN PAST 24 HOURS: 0/10

## 2022-08-31 NOTE — PROGRESS NOTES
Anticoagulation Summary  As of 2022      INR goal:  2.0-3.0   TTR:  82.6 % (2.4 wk)   INR used for dosin.20 (2022)   Warfarin maintenance plan:  2.5 mg (5 mg x 0.5) every Mon, Thu; 5 mg (5 mg x 1) all other days   Weekly warfarin total:  30 mg   Plan last modified:  Monica Beltran (2022)   Next INR check:  2022   Target end date:  Indefinite    Indications    History of atrial fibrillation [Z86.79]  Acute deep vein thrombosis (DVT) of left upper extremity (HCC) [I82.622]                 Anticoagulation Episode Summary       INR check location:      Preferred lab:      Send INR reminders to:      Comments[facility]  Switch back to Eliquis for AF if DVT is resolved after 3 months (around 10/25/22)          Anticoagulation Care Providers       Provider Role Specialty Phone number    [facility]   [phone]          Anticoagulation Patient Findings  Patient Findings       Negatives:  Signs/symptoms of thrombosis, Signs/symptoms of bleeding, Laboratory test error suspected, Change in health, Change in alcohol use, Change in activity, Upcoming invasive procedure, Emergency department visit, Upcoming dental procedure, Missed doses, Extra doses, Change in medications, Change in diet/appetite, Hospital admission, Bruising, Other complaints          Spoke with patient today regarding therapeutic INR of 2.2.  Patient denies any signs/symptoms of bruising or bleeding or any changes in diet and medications.  Instructed patient to call clinic with any questions or concerns.    Pt is not on antiplatelet therapy    Pt is to continue with current warfarin dosing regimen.  Follow up in 1 weeks, to reduce risk of adverse events related to this high risk medication,  Warfarin.    Scotty Faith, PharmD, BCACP

## 2022-09-01 LAB — TACROLIMUS BLD-MCNC: 5 NG/ML

## 2022-09-02 ENCOUNTER — HOME CARE VISIT (OUTPATIENT)
Dept: HOME HEALTH SERVICES | Facility: HOME HEALTHCARE | Age: 67
End: 2022-09-02
Payer: MEDICARE

## 2022-09-02 VITALS
RESPIRATION RATE: 16 BRPM | DIASTOLIC BLOOD PRESSURE: 78 MMHG | BODY MASS INDEX: 26.8 KG/M2 | TEMPERATURE: 97.4 F | SYSTOLIC BLOOD PRESSURE: 112 MMHG | HEART RATE: 88 BPM | WEIGHT: 226 LBS | OXYGEN SATURATION: 96 %

## 2022-09-02 PROCEDURE — 665001 SOC-HOME HEALTH

## 2022-09-02 PROCEDURE — G0299 HHS/HOSPICE OF RN EA 15 MIN: HCPCS

## 2022-09-02 ASSESSMENT — ENCOUNTER SYMPTOMS
PAIN SEVERITY GOAL: 0/10
DENIES PAIN: 1
NAUSEA: PT DENIES ANY NAUSEA AT THIS TIME
VOMITING: PT DENIES ANY EMESIS AT THIS TIME
LOWEST PAIN SEVERITY IN PAST 24 HOURS: 0/10
HIGHEST PAIN SEVERITY IN PAST 24 HOURS: 0/10
PERSON REPORTING PAIN: PATIENT

## 2022-09-02 ASSESSMENT — FIBROSIS 4 INDEX: FIB4 SCORE: 1.54

## 2022-09-06 ENCOUNTER — HOME CARE VISIT (OUTPATIENT)
Dept: HOME HEALTH SERVICES | Facility: HOME HEALTHCARE | Age: 67
End: 2022-09-06
Payer: MEDICARE

## 2022-09-06 ENCOUNTER — ANTICOAGULATION MONITORING (OUTPATIENT)
Dept: VASCULAR LAB | Facility: MEDICAL CENTER | Age: 67
End: 2022-09-06
Payer: MEDICARE

## 2022-09-06 VITALS
HEART RATE: 77 BPM | OXYGEN SATURATION: 97 % | TEMPERATURE: 98 F | WEIGHT: 228 LBS | SYSTOLIC BLOOD PRESSURE: 120 MMHG | RESPIRATION RATE: 16 BRPM | BODY MASS INDEX: 27.04 KG/M2 | DIASTOLIC BLOOD PRESSURE: 70 MMHG

## 2022-09-06 DIAGNOSIS — Z86.79 HISTORY OF ATRIAL FIBRILLATION: ICD-10-CM

## 2022-09-06 DIAGNOSIS — I82.622 ACUTE DEEP VEIN THROMBOSIS (DVT) OF LEFT UPPER EXTREMITY, UNSPECIFIED VEIN (HCC): ICD-10-CM

## 2022-09-06 LAB
INR PPP: 2.4 (ref 2–3.5)
INR PPP: 2.4 - CRITICAL LOW: < 0.8, CRITICAL HIGH: > 6.5 (ref 2–3.5)

## 2022-09-06 PROCEDURE — G0299 HHS/HOSPICE OF RN EA 15 MIN: HCPCS

## 2022-09-06 ASSESSMENT — ENCOUNTER SYMPTOMS
HIGHEST PAIN SEVERITY IN PAST 24 HOURS: 0/10
PAIN SEVERITY GOAL: 0/10
LOWEST PAIN SEVERITY IN PAST 24 HOURS: 0/10
VOMITING: DENIES
NAUSEA: DENIES
DENIES PAIN: 1
PAIN: DENIES ANY PAIN OR DISCOMFORT AT TIME OF NURSING VISIT.

## 2022-09-06 ASSESSMENT — FIBROSIS 4 INDEX: FIB4 SCORE: 1.54

## 2022-09-06 NOTE — Clinical Note
History pertinent to today's visit: on IV antibiotic via Midline d/t bacteremia, on Coumadin d/t blood clot to LUE, multiple open wounds needing wound care to RLE.   VS: /70 Site Right arm Position Sitting Cuff Size Adult Resp 16 SpO2 97% Pulse 77 Temp 36.7 C ( 98 F) Temp Source Temporal  INR: 2.4 results to RCC  CHF: 228lb, no SOB and no edema  PAIN: denies  APPETITE/FLUID: good, eating 3 meals daily  DM:  A1C 6.1 on 8/31/22  GI/: denies any issue, last BM this morning- normal  MEDICATION CHANGES: was seen by Cielo Choudhary MD- added Iron but has not yet picked up  FALLS: none  RESP: lungs clear  EDEMA: none  SKIN: wound care per POC, pictures and measurements taken  NEXT NURSING VISIT: 9/9  CC: CM

## 2022-09-06 NOTE — PROGRESS NOTES
Anticoagulation Summary  As of 2022      INR goal:  2.0-3.0   TTR:  87.3 % (3.4 wk)   INR used for dosin.40 (2022)   Warfarin maintenance plan:  2.5 mg (5 mg x 0.5) every Mon, Thu; 5 mg (5 mg x 1) all other days   Weekly warfarin total:  30 mg   Plan last modified:  Monica Beltran (2022)   Next INR check:  2022   Target end date:  Indefinite    Indications    History of atrial fibrillation [Z86.79]  Acute deep vein thrombosis (DVT) of left upper extremity (HCC) [I82.622]                 Anticoagulation Episode Summary       INR check location:      Preferred lab:      Send INR reminders to:      Comments:  [facility]  Switch back to Eliquis for AF if DVT is resolved after 3 months (around 10/25/22)          Anticoagulation Care Providers       Provider Role Specialty Phone number    [facility]   [phone]            Refer to Anticoagulation Patient Findings for HPI  Patient Findings       Negatives:  Signs/symptoms of thrombosis, Signs/symptoms of bleeding, Laboratory test error suspected, Change in health, Change in alcohol use, Change in activity, Upcoming invasive procedure, Emergency department visit, Upcoming dental procedure, Missed doses, Extra doses, Change in medications, Change in diet/appetite, Hospital admission, Bruising, Other complaints            Spoke with patient  to report a therapeutic INR.      Pt instructed to continue with current warfarin dosing regimen, confirms dosing.   Will follow up in 1 week(s) via [facility].     Nelida Flores, NeryD

## 2022-09-09 ENCOUNTER — HOME CARE VISIT (OUTPATIENT)
Dept: HOME HEALTH SERVICES | Facility: HOME HEALTHCARE | Age: 67
End: 2022-09-09
Payer: MEDICARE

## 2022-09-09 VITALS
TEMPERATURE: 97.8 F | HEART RATE: 68 BPM | RESPIRATION RATE: 16 BRPM | OXYGEN SATURATION: 96 % | SYSTOLIC BLOOD PRESSURE: 110 MMHG | DIASTOLIC BLOOD PRESSURE: 60 MMHG | WEIGHT: 228.6 LBS | BODY MASS INDEX: 27.11 KG/M2

## 2022-09-09 PROCEDURE — G0299 HHS/HOSPICE OF RN EA 15 MIN: HCPCS

## 2022-09-09 PROCEDURE — A6210 FOAM DRG >16<=48 SQ IN W/O B: HCPCS

## 2022-09-09 ASSESSMENT — FIBROSIS 4 INDEX: FIB4 SCORE: 1.54

## 2022-09-09 ASSESSMENT — ENCOUNTER SYMPTOMS
DENIES PAIN: 1
PERSON REPORTING PAIN: PATIENT
VOMITING: NO
NAUSEA: NO
MUSCLE WEAKNESS: 1

## 2022-09-13 ENCOUNTER — HOME CARE VISIT (OUTPATIENT)
Dept: HOME HEALTH SERVICES | Facility: HOME HEALTHCARE | Age: 67
End: 2022-09-13
Payer: MEDICARE

## 2022-09-13 ENCOUNTER — ANTICOAGULATION MONITORING (OUTPATIENT)
Dept: MEDICAL GROUP | Facility: PHYSICIAN GROUP | Age: 67
End: 2022-09-13
Payer: MEDICARE

## 2022-09-13 VITALS
TEMPERATURE: 97.9 F | RESPIRATION RATE: 16 BRPM | DIASTOLIC BLOOD PRESSURE: 70 MMHG | HEART RATE: 82 BPM | BODY MASS INDEX: 26.87 KG/M2 | WEIGHT: 226.6 LBS | OXYGEN SATURATION: 94 % | SYSTOLIC BLOOD PRESSURE: 102 MMHG

## 2022-09-13 DIAGNOSIS — I82.622 ACUTE DEEP VEIN THROMBOSIS (DVT) OF LEFT UPPER EXTREMITY, UNSPECIFIED VEIN (HCC): ICD-10-CM

## 2022-09-13 DIAGNOSIS — Z86.79 HISTORY OF ATRIAL FIBRILLATION: ICD-10-CM

## 2022-09-13 LAB
INR PPP: 2.2 (ref 2–3.5)
INR PPP: 2.2 - CRITICAL LOW: < 0.8, CRITICAL HIGH: > 6.5 (ref 2–3.5)

## 2022-09-13 PROCEDURE — G0299 HHS/HOSPICE OF RN EA 15 MIN: HCPCS

## 2022-09-13 ASSESSMENT — ENCOUNTER SYMPTOMS
NAUSEA: PT DENIES ANY NAUSEA AT THIS TIME
PERSON REPORTING PAIN: PATIENT
DENIES PAIN: 1
VOMITING: PT DENIES ANY EMESIS AT THIS TIME

## 2022-09-13 ASSESSMENT — FIBROSIS 4 INDEX: FIB4 SCORE: 1.54

## 2022-09-13 NOTE — PROGRESS NOTES
Anticoagulation Summary  As of 2022      INR goal:  2.0-3.0   TTR:  90.1 % (1 mo)   INR used for dosin.20 (2022)   Warfarin maintenance plan:  2.5 mg (5 mg x 0.5) every Mon, Thu; 5 mg (5 mg x 1) all other days   Weekly warfarin total:  30 mg   Plan last modified:  Monica Beltran (2022)   Next INR check:  2022   Target end date:  Indefinite    Indications    History of atrial fibrillation [Z86.79]  Acute deep vein thrombosis (DVT) of left upper extremity (HCC) [I82.622]                 Anticoagulation Episode Summary       INR check location:      Preferred lab:      Send INR reminders to:      Comments[facility]  [facility]  Switch back to Eliquis for AF if DVT is resolved after 3 months (around 10/25/22)          Anticoagulation Care Providers       Provider Role Specialty Phone number    [facility]   [phone]            Refer to Anticoagulation Patient Findings for HPI  Patient Findings       Negatives:  Signs/symptoms of thrombosis, Signs/symptoms of bleeding, Laboratory test error suspected, Change in health, Change in alcohol use, Change in activity, Upcoming invasive procedure, Emergency department visit, Upcoming dental procedure, Missed doses, Extra doses, Change in medications, Change in diet/appetite, Hospital admission, Bruising, Other complaints            Spoke with patient to report a therapeutic INR.      Pt instructed to continue with current warfarin dosing regimen, confirms dosing.   Will follow up in 1 week(s) via [facility].     Nelida Flores, NeryD

## 2022-09-14 RX ORDER — GABAPENTIN 300 MG/1
600 CAPSULE ORAL
Qty: 360 CAPSULE | Refills: 1 | Status: SHIPPED | OUTPATIENT
Start: 2022-09-14 | End: 2023-03-14

## 2022-09-16 ENCOUNTER — HOME CARE VISIT (OUTPATIENT)
Dept: HOME HEALTH SERVICES | Facility: HOME HEALTHCARE | Age: 67
End: 2022-09-16
Payer: MEDICARE

## 2022-09-16 VITALS
WEIGHT: 226 LBS | OXYGEN SATURATION: 99 % | BODY MASS INDEX: 26.8 KG/M2 | HEART RATE: 68 BPM | RESPIRATION RATE: 16 BRPM | TEMPERATURE: 97.8 F | DIASTOLIC BLOOD PRESSURE: 70 MMHG | SYSTOLIC BLOOD PRESSURE: 110 MMHG

## 2022-09-16 PROCEDURE — G0299 HHS/HOSPICE OF RN EA 15 MIN: HCPCS

## 2022-09-16 ASSESSMENT — ENCOUNTER SYMPTOMS
VOMITING: PT DENIES ANY EMESIS AT THIS TIME
PERSON REPORTING PAIN: PATIENT
DENIES PAIN: 1
NAUSEA: PT DENIES ANY NAUSEA AT THIS TIME

## 2022-09-16 ASSESSMENT — FIBROSIS 4 INDEX: FIB4 SCORE: 1.54

## 2022-09-20 ENCOUNTER — ANTICOAGULATION MONITORING (OUTPATIENT)
Dept: MEDICAL GROUP | Facility: PHYSICIAN GROUP | Age: 67
End: 2022-09-20
Payer: MEDICARE

## 2022-09-20 ENCOUNTER — HOME CARE VISIT (OUTPATIENT)
Dept: HOME HEALTH SERVICES | Facility: HOME HEALTHCARE | Age: 67
End: 2022-09-20
Payer: MEDICARE

## 2022-09-20 VITALS
OXYGEN SATURATION: 98 % | DIASTOLIC BLOOD PRESSURE: 80 MMHG | TEMPERATURE: 97.5 F | BODY MASS INDEX: 27.04 KG/M2 | RESPIRATION RATE: 16 BRPM | WEIGHT: 228 LBS | SYSTOLIC BLOOD PRESSURE: 120 MMHG | HEART RATE: 76 BPM

## 2022-09-20 DIAGNOSIS — Z86.79 HISTORY OF ATRIAL FIBRILLATION: ICD-10-CM

## 2022-09-20 DIAGNOSIS — I82.622 ACUTE DEEP VEIN THROMBOSIS (DVT) OF LEFT UPPER EXTREMITY, UNSPECIFIED VEIN (HCC): ICD-10-CM

## 2022-09-20 LAB
INR PPP: 2 (ref 2–3.5)
INR PPP: 2 - CRITICAL LOW: < 0.8, CRITICAL HIGH: > 6.5 (ref 2–3.5)

## 2022-09-20 PROCEDURE — G0493 RN CARE EA 15 MIN HH/HOSPICE: HCPCS

## 2022-09-20 ASSESSMENT — ACTIVITIES OF DAILY LIVING (ADL)
OASIS_M1830: 00
HOME_HEALTH_OASIS: 00

## 2022-09-20 ASSESSMENT — ENCOUNTER SYMPTOMS
PAIN SEVERITY GOAL: 0/10
DENIES PAIN: 1
LOWEST PAIN SEVERITY IN PAST 24 HOURS: 0/10
HIGHEST PAIN SEVERITY IN PAST 24 HOURS: 0/10
PERSON REPORTING PAIN: PATIENT

## 2022-09-20 ASSESSMENT — FIBROSIS 4 INDEX: FIB4 SCORE: 1.54

## 2022-09-20 ASSESSMENT — PATIENT HEALTH QUESTIONNAIRE - PHQ9: CLINICAL INTERPRETATION OF PHQ2 SCORE: 0

## 2022-09-21 ENCOUNTER — DOCUMENTATION (OUTPATIENT)
Dept: HEALTH INFORMATION MANAGEMENT | Facility: OTHER | Age: 67
End: 2022-09-21
Payer: MEDICARE

## 2022-09-21 NOTE — PROGRESS NOTES
OP Anticoagulation Service Note    Date: 2022    Anticoagulation Summary  As of 2022      INR goal:  2.0-3.0   TTR:  91.9 % (1.3 mo)   INR used for dosin.00 (2022)   Warfarin maintenance plan:  2.5 mg (5 mg x 0.5) every Mon, Thu; 5 mg (5 mg x 1) all other days   Weekly warfarin total:  30 mg   Plan last modified:  Monica Beltran (2022)   Next INR check:  10/18/2022   Target end date:  Indefinite    Indications    History of atrial fibrillation [Z86.79]  Acute deep vein thrombosis (DVT) of left upper extremity (HCC) [I82.622]                 Anticoagulation Episode Summary       INR check location:      Preferred lab:      Send INR reminders to:      Comments:  Switch back to Eliquis for AF if DVT is resolved after 3 months (around 10/25/22)          Anticoagulation Care Providers       Provider Role Specialty Phone number    [facility]   [phone]          Anticoagulation Patient Findings            HPI:   The reason for today's call is to prevent morbidity and mortality from a blood clot and/or stroke and to reduce the risk of bleeding while on a anticoagulant.     PCP:  Ganesh Benton III, M.D.  [address] [facility] [address]    Assessment:     INR  therapeutic.     Lab Results   Component Value Date/Time    BUN 37 (H) 2022 07:12 AM    CREATININE 1.86 (H) 2022 07:12 AM    CREATININE 2.4 (H) 2006 06:20 AM     Lab Results   Component Value Date/Time    HEMOGLOBIN 10.7 (L) 2022 07:12 AM    HEMATOCRIT 36.1 (L) 2022 07:12 AM    PLATELETCT 153 (L) 2022 07:12 AM    ALKPHOSPHAT 64 2022 07:12 AM    ASTSGOT 14 2022 07:12 AM    ALTSGPT 15 2022 07:12 AM          Current Outpatient Medications:     gabapentin, 600 mg, Oral, BID    furosemide, 20 mg, Oral, QDAY PRN    SITagliptin, 50 mg, Oral, DAILY    pioglitazone, 45 mg, Oral, DAILY    mycophenolate,     warfarin, 5 mg, Oral, DAILY AT 1800     carvedilol, 3.125 mg, Oral, BID    omeprazole, 20 mg, Oral, DAILY    CoQ10, 1 Capsule, Oral, DAILY    mycophenolate, 500 mg, Oral, BID    acetaminophen, 650 mg, Oral, Q4HRS PRN    glyBURIDE, 10 mg, Oral, QDAY with Breakfast    predniSONE, 5 mg, Oral, DAILY    tacrolimus, 1 mg, Oral, BID (Patient taking differently: 1 mg, Oral, 2 TIMES DAILY, [id number], Other)    atorvastatin, 80 mg, Oral, QHS      Plan:     Continue the same warfarin dose, as noted above.       Follow-up:     test in 3 weeks.        Additional information discussed with patient:     Asked patient to please call the anticoagulation clinic if they have any signs/symptoms of bleeding and/or thrombosis or any changes to diet or medications.      National recommendations regarding anticoagulation therapy:     The CHEST guidelines recommends frequent INR monitoring at regular intervals (a few days up to a max of 12 weeks) to ensure patients are on the proper dose of warfarin, and patients are not having any complications from therapy.  INRs can dramatically change over a short time period due to diet, medications, and medical conditions.       Sudarshan Leal, PharmD, MS, BCACP, [facility]  Phone: [phone]  Fax: [phone]  On call: [phone]  General scheduling/information [phone]  For emergencies please dial [phone]  Please do not use [employer] for urgent matters, call the phone numbers listed above.    This note was created using voice recognition software (Dragon). The accuracy of the dictation is limited by the abilities of the software. I have reviewed the note prior to signing, however some errors in grammar and context are still possible. If you have any questions related to this note please do not hesitate to contact our office.

## 2022-09-26 ENCOUNTER — HOME CARE VISIT (OUTPATIENT)
Dept: HOME HEALTH SERVICES | Facility: HOME HEALTHCARE | Age: 67
End: 2022-09-26
Payer: MEDICARE

## 2022-09-26 NOTE — CASE COMMUNICATION
Quality Review for 9.20.22 MO OASIS performed on by FREDI Dixon RN on 9.21.2022:    Edits completed by FREDI Dixon RN:  1. Changed  E,A to yes per POC

## 2022-09-26 NOTE — Clinical Note
I agree with these changes  Nohemy Viveros RN  ----- Message -----  From: Katy Dixon R.N.  Sent: 9/26/2022   4:34 PM PDT  To: Nohemy Viveros R.N.      Quality Review for 9.20.22 ME OASIS performed on by FREDI Dixon RN on 9.21.2022:    Edits completed by FREDI Dixon RN:  1. Changed  E,A to yes per POC

## 2022-10-10 ENCOUNTER — ANTICOAGULATION VISIT (OUTPATIENT)
Dept: MEDICAL GROUP | Facility: PHYSICIAN GROUP | Age: 67
End: 2022-10-10
Payer: MEDICARE

## 2022-10-10 DIAGNOSIS — Z86.79 HISTORY OF ATRIAL FIBRILLATION: ICD-10-CM

## 2022-10-10 DIAGNOSIS — I82.622 ACUTE DEEP VEIN THROMBOSIS (DVT) OF LEFT UPPER EXTREMITY, UNSPECIFIED VEIN (HCC): ICD-10-CM

## 2022-10-10 LAB — INR PPP: 1.8 (ref 2–3.5)

## 2022-10-10 PROCEDURE — 99211 OFF/OP EST MAY X REQ PHY/QHP: CPT | Performed by: FAMILY MEDICINE

## 2022-10-10 PROCEDURE — 85610 PROTHROMBIN TIME: CPT | Performed by: FAMILY MEDICINE

## 2022-10-10 NOTE — PROGRESS NOTES
Anticoagulation Summary  As of 10/10/2022      INR goal:  2.0-3.0   TTR:  60.1 % (1.9 mo)   INR used for dosin.80 (10/10/2022)   Warfarin maintenance plan:  2.5 mg (5 mg x 0.5) every Thu; 5 mg (5 mg x 1) all other days   Weekly warfarin total:  32.5 mg   Plan last modified:  Nelida Flores, PharmD (10/10/2022)   Next INR check:  10/24/2022   Target end date:  Indefinite    Indications    History of atrial fibrillation [Z86.79]  Acute deep vein thrombosis (DVT) of left upper extremity (HCC) [I82.622]                 Anticoagulation Episode Summary       INR check location:      Preferred lab:      Send INR reminders to:      Comments:  Switch back to Eliquis for AF if DVT is resolved after 3 months (around 10/25/22)          Anticoagulation Care Providers       Provider Role Specialty Phone number    [facility]   [phone]          Anticoagulation Patient Findings  Patient Findings       Negatives:  Signs/symptoms of thrombosis, Signs/symptoms of bleeding, Laboratory test error suspected, Change in health, Change in alcohol use, Change in activity, Upcoming invasive procedure, Emergency department visit, Upcoming dental procedure, Missed doses, Extra doses, Change in medications, Change in diet/appetite, Hospital admission, Bruising, Other complaints                HPI:   Jama Altman  seen in clinic today, on anticoagulation therapy with warfarin for stroke and VTE prophylaxis due to history of DVT and Atrial fibrillation.    Patient's previous INR was therapeutic at 2.0 on 2022, at which time patient was instructed to continue with current warfarin dosing regimen.  .  He returns to clinic today to recheck INR to ensure it is therapeutic and thus preventing possible clotting and/or bleeding/bruising complications.    CHADS-VASc = at least 4  (unadjusted ischemic stroke risk/year:  4.8%, which is moderate risk)    Does patient have any changes to current medical/health status  since last appt (Y/N):  no  Does patient have any signs/symptoms of bleeding and/or thrombosis since the last appt (Y/N):  no  Does patient have any interval changes to diet or medications since last appt (Y/N):  no  Are there any complications or cost restrictions with current therapy (Y/N):  no     Does patient have [facility] PCP? Dr Ganesh Benton (If not, please document discussion that patient must be seen at [facility])       Vitals:  declined    There were no vitals filed for this visit.     Asssessment:      INR SUBtherapeutic at 1.8, therefore pt is at increased risk of stroke.  Reason(s) for out of range INR today:  Dose too low.       Pt is not on antiplatelet therapy    Medication reconciliation completed today.    Plan:  pt was instructed to increase TWD as detailed above.     Follow up:  Because warfarin is a high risk medication and current CHEST guidelines recommend regular monitoring intervals (few days up to 12 weeks), will have patient return to clinic in 1 week to recheck INR.    Jama Luna, Pharmacy Intern  Nelida Flores, PharmD

## 2022-10-24 ENCOUNTER — ANTICOAGULATION VISIT (OUTPATIENT)
Dept: MEDICAL GROUP | Facility: PHYSICIAN GROUP | Age: 67
End: 2022-10-24
Payer: MEDICARE

## 2022-10-24 DIAGNOSIS — I82.622 ACUTE DEEP VEIN THROMBOSIS (DVT) OF LEFT UPPER EXTREMITY, UNSPECIFIED VEIN (HCC): ICD-10-CM

## 2022-10-24 DIAGNOSIS — Z86.79 HISTORY OF ATRIAL FIBRILLATION: ICD-10-CM

## 2022-10-24 DIAGNOSIS — Z79.01 LONG TERM (CURRENT) USE OF ANTICOAGULANTS: Primary | ICD-10-CM

## 2022-10-24 LAB — INR PPP: 2.4 (ref 2–3.5)

## 2022-10-24 PROCEDURE — 99999 PR NO CHARGE: CPT | Performed by: INTERNAL MEDICINE

## 2022-10-24 PROCEDURE — 85610 PROTHROMBIN TIME: CPT | Performed by: INTERNAL MEDICINE

## 2022-10-24 PROCEDURE — 99211 OFF/OP EST MAY X REQ PHY/QHP: CPT | Performed by: INTERNAL MEDICINE

## 2022-10-24 NOTE — PROGRESS NOTES
Anticoagulation Summary  As of 10/24/2022      INR goal:  2.0-3.0   TTR:  61.4 % (2.4 mo)   INR used for dosin.40 (10/24/2022)   Warfarin maintenance plan:  2.5 mg (5 mg x 0.5) every Thu; 5 mg (5 mg x 1) all other days   Weekly warfarin total:  32.5 mg   Plan last modified:  Nelida Flores, PharmD (10/10/2022)   Next INR check:  2023   Target end date:  Indefinite    Indications    History of atrial fibrillation [Z86.79]  Acute deep vein thrombosis (DVT) of left upper extremity (HCC) [I82.622]  Long term (current) use of anticoagulants [Z79.01]                 Anticoagulation Episode Summary       INR check location:      Preferred lab:      Send INR reminders to:      Comments:  Switch back to Eliquis for AF if DVT is resolved after 3 months (around 10/25/22)          Anticoagulation Care Providers       Provider Role Specialty Phone number    [facility] Responsible  [phone]          Anticoagulation Patient Findings  Patient Findings       Negatives:  Signs/symptoms of thrombosis, Signs/symptoms of bleeding, Laboratory test error suspected, Change in health, Change in alcohol use, Change in activity, Upcoming invasive procedure, Emergency department visit, Upcoming dental procedure, Missed doses, Extra doses, Change in medications, Change in diet/appetite, Hospital admission, Bruising, Other complaints          HPI:   Jama Altman seen in clinic today, on anticoagulation therapy with warfarin for stroke prophylaxis due to history of DVT and atrial fibrillation     Patient's previous INR was therapeutic at 1.8 on 10/10/2022, at which time patient was instructed to increase TWD.  He returns to clinic today to recheck INR to ensure it is therapeutic and thus preventing possible clotting and/or bleeding/bruising complications.    CHADSVASC = 4 (age, dm, htn, vasc dz)  HAS-BLED = 1 (age)  DOAC = developed LUE DVT on Eliquis  Target end date = 3 months on warfarin then switch to  Eliquis for AF if DVT resolves       Does patient have any changes to current medical/health status since last appt (Y/N):  no   Does patient have any signs/symptoms of bleeding and/or thrombosis since the last appt (Y/N):  no  Does patient have any interval changes to diet or medications since last appt (Y/N):  no  Are there any complications or cost restrictions with current therapy (Y/N):  no     Does patient have [facility] PCP? Dr Ganesh Benton  (If not, please document discussion that patient must be seen at [facility])       Vitals:  declined    There were no vitals filed for this visit.     Asssessment:      INR therapeutic at 2.4, therefore decreased risk of stroke and VTE.    Reason(s) for out of range INR today:  NA      Pt is not on antiplatelet therapy    Medication reconciliation completed today.    Plan:  Pt is to continue with current warfarin dosing regimen, until they start Eliquis. Pt will  Eliquis today and will then not take warfarin upon starting Eliquis.      Follow up:  Because warfarin is a high risk medication and current CHEST guidelines recommend regular monitoring intervals (few days up to 12 weeks), will have patient return to clinic in 12 weeks to recheck INR.    Jama Luna Pharmacy Intern   Scotty Faith, PharmD

## 2022-10-26 RX ORDER — TADALAFIL 5 MG/1
TABLET ORAL
Qty: 15 TABLET | Refills: 3 | Status: SHIPPED | OUTPATIENT
Start: 2022-10-26 | End: 2023-11-12 | Stop reason: SDUPTHER

## 2022-11-01 ENCOUNTER — HOSPITAL ENCOUNTER (OUTPATIENT)
Dept: LAB | Facility: MEDICAL CENTER | Age: 67
End: 2022-11-01
Attending: NURSE PRACTITIONER
Payer: MEDICARE

## 2022-11-01 LAB
25(OH)D3 SERPL-MCNC: 37 NG/ML (ref 30–100)
ALBUMIN SERPL BCP-MCNC: 4.3 G/DL (ref 3.2–4.9)
ALBUMIN/GLOB SERPL: 1.7 G/DL
ALP SERPL-CCNC: 105 U/L (ref 30–99)
ALT SERPL-CCNC: 15 U/L (ref 2–50)
ANION GAP SERPL CALC-SCNC: 11 MMOL/L (ref 7–16)
APPEARANCE UR: CLEAR
AST SERPL-CCNC: 18 U/L (ref 12–45)
BASOPHILS # BLD AUTO: 1.2 % (ref 0–1.8)
BASOPHILS # BLD: 0.06 K/UL (ref 0–0.12)
BILIRUB SERPL-MCNC: 0.4 MG/DL (ref 0.1–1.5)
BILIRUB UR QL STRIP.AUTO: NEGATIVE
BUN SERPL-MCNC: 52 MG/DL (ref 8–22)
CALCIUM SERPL-MCNC: 9.4 MG/DL (ref 8.5–10.5)
CHLORIDE SERPL-SCNC: 110 MMOL/L (ref 96–112)
CO2 SERPL-SCNC: 21 MMOL/L (ref 20–33)
COLOR UR: YELLOW
CREAT SERPL-MCNC: 1.97 MG/DL (ref 0.5–1.4)
CREAT UR-MCNC: 58.7 MG/DL
EOSINOPHIL # BLD AUTO: 0.07 K/UL (ref 0–0.51)
EOSINOPHIL NFR BLD: 1.4 % (ref 0–6.9)
ERYTHROCYTE [DISTWIDTH] IN BLOOD BY AUTOMATED COUNT: 56.1 FL (ref 35.9–50)
FASTING STATUS PATIENT QL REPORTED: NORMAL
GFR SERPLBLD CREATININE-BSD FMLA CKD-EPI: 37 ML/MIN/1.73 M 2
GLOBULIN SER CALC-MCNC: 2.6 G/DL (ref 1.9–3.5)
GLUCOSE SERPL-MCNC: 76 MG/DL (ref 65–99)
GLUCOSE UR STRIP.AUTO-MCNC: NEGATIVE MG/DL
HCT VFR BLD AUTO: 42.1 % (ref 42–52)
HGB BLD-MCNC: 12.8 G/DL (ref 14–18)
IMM GRANULOCYTES # BLD AUTO: 0.03 K/UL (ref 0–0.11)
IMM GRANULOCYTES NFR BLD AUTO: 0.6 % (ref 0–0.9)
KETONES UR STRIP.AUTO-MCNC: NEGATIVE MG/DL
LEUKOCYTE ESTERASE UR QL STRIP.AUTO: NEGATIVE
LYMPHOCYTES # BLD AUTO: 1.28 K/UL (ref 1–4.8)
LYMPHOCYTES NFR BLD: 25.1 % (ref 22–41)
MAGNESIUM SERPL-MCNC: 1.7 MG/DL (ref 1.5–2.5)
MCH RBC QN AUTO: 26.9 PG (ref 27–33)
MCHC RBC AUTO-ENTMCNC: 30.4 G/DL (ref 33.7–35.3)
MCV RBC AUTO: 88.4 FL (ref 81.4–97.8)
MICRO URNS: NORMAL
MONOCYTES # BLD AUTO: 0.73 K/UL (ref 0–0.85)
MONOCYTES NFR BLD AUTO: 14.3 % (ref 0–13.4)
NEUTROPHILS # BLD AUTO: 2.92 K/UL (ref 1.82–7.42)
NEUTROPHILS NFR BLD: 57.4 % (ref 44–72)
NITRITE UR QL STRIP.AUTO: NEGATIVE
NRBC # BLD AUTO: 0 K/UL
NRBC BLD-RTO: 0 /100 WBC
PH UR STRIP.AUTO: 6 [PH] (ref 5–8)
PHOSPHATE SERPL-MCNC: 4.3 MG/DL (ref 2.5–4.5)
PLATELET # BLD AUTO: 139 K/UL (ref 164–446)
PMV BLD AUTO: 12.7 FL (ref 9–12.9)
POTASSIUM SERPL-SCNC: 5.3 MMOL/L (ref 3.6–5.5)
PROT SERPL-MCNC: 6.9 G/DL (ref 6–8.2)
PROT UR QL STRIP: NEGATIVE MG/DL
PROT UR-MCNC: 6 MG/DL (ref 0–15)
PROT/CREAT UR: 102 MG/G (ref 15–68)
PTH-INTACT SERPL-MCNC: 55 PG/ML (ref 14–72)
RBC # BLD AUTO: 4.76 M/UL (ref 4.7–6.1)
RBC UR QL AUTO: NEGATIVE
SODIUM SERPL-SCNC: 142 MMOL/L (ref 135–145)
SP GR UR STRIP.AUTO: 1.01
URATE SERPL-MCNC: 6.5 MG/DL (ref 2.5–8.3)
UROBILINOGEN UR STRIP.AUTO-MCNC: 0.2 MG/DL
WBC # BLD AUTO: 5.1 K/UL (ref 4.8–10.8)

## 2022-11-01 PROCEDURE — 81003 URINALYSIS AUTO W/O SCOPE: CPT

## 2022-11-01 PROCEDURE — 84550 ASSAY OF BLOOD/URIC ACID: CPT

## 2022-11-01 PROCEDURE — 84100 ASSAY OF PHOSPHORUS: CPT

## 2022-11-01 PROCEDURE — 83735 ASSAY OF MAGNESIUM: CPT

## 2022-11-01 PROCEDURE — 85025 COMPLETE CBC W/AUTO DIFF WBC: CPT

## 2022-11-01 PROCEDURE — 80053 COMPREHEN METABOLIC PANEL: CPT

## 2022-11-01 PROCEDURE — 84156 ASSAY OF PROTEIN URINE: CPT

## 2022-11-01 PROCEDURE — 36415 COLL VENOUS BLD VENIPUNCTURE: CPT

## 2022-11-01 PROCEDURE — 80197 ASSAY OF TACROLIMUS: CPT

## 2022-11-01 PROCEDURE — 82306 VITAMIN D 25 HYDROXY: CPT

## 2022-11-01 PROCEDURE — 82043 UR ALBUMIN QUANTITATIVE: CPT

## 2022-11-01 PROCEDURE — 83970 ASSAY OF PARATHORMONE: CPT

## 2022-11-01 PROCEDURE — 82570 ASSAY OF URINE CREATININE: CPT

## 2022-11-02 LAB
CREAT UR-MCNC: 59.55 MG/DL
MICROALBUMIN UR-MCNC: <1.2 MG/DL
MICROALBUMIN/CREAT UR: NORMAL MG/G (ref 0–30)
TACROLIMUS BLD-MCNC: 5.3 NG/ML

## 2022-11-09 ENCOUNTER — OFFICE VISIT (OUTPATIENT)
Dept: MEDICAL GROUP | Facility: PHYSICIAN GROUP | Age: 67
End: 2022-11-09
Payer: MEDICARE

## 2022-11-09 VITALS
HEIGHT: 77 IN | DIASTOLIC BLOOD PRESSURE: 70 MMHG | RESPIRATION RATE: 16 BRPM | HEART RATE: 74 BPM | SYSTOLIC BLOOD PRESSURE: 118 MMHG | WEIGHT: 238 LBS | OXYGEN SATURATION: 98 % | BODY MASS INDEX: 28.1 KG/M2 | TEMPERATURE: 97.4 F

## 2022-11-09 DIAGNOSIS — N18.32 STAGE 3B CHRONIC KIDNEY DISEASE: ICD-10-CM

## 2022-11-09 DIAGNOSIS — I10 DIABETES MELLITUS WITH COINCIDENT HYPERTENSION (HCC): ICD-10-CM

## 2022-11-09 DIAGNOSIS — E11.9 DIABETES MELLITUS WITH COINCIDENT HYPERTENSION (HCC): ICD-10-CM

## 2022-11-09 PROBLEM — S81.801A WOUND OF RIGHT LOWER EXTREMITY: Status: RESOLVED | Noted: 2022-06-16 | Resolved: 2022-11-09

## 2022-11-09 PROBLEM — L03.115 CELLULITIS OF RIGHT LOWER EXTREMITY: Status: RESOLVED | Noted: 2022-06-14 | Resolved: 2022-11-09

## 2022-11-09 PROCEDURE — 92250 FUNDUS PHOTOGRAPHY W/I&R: CPT | Performed by: FAMILY MEDICINE

## 2022-11-09 PROCEDURE — 99214 OFFICE O/P EST MOD 30 MIN: CPT | Performed by: FAMILY MEDICINE

## 2022-11-09 ASSESSMENT — FIBROSIS 4 INDEX: FIB4 SCORE: 2.17

## 2022-11-09 NOTE — ASSESSMENT & PLAN NOTE
This is a chronic problem.  Patient's last hemoglobin A1c was very good.  His blood pressure remains under good control as well.  His next hemoglobin A1c is due the middle of next month.

## 2022-11-09 NOTE — ASSESSMENT & PLAN NOTE
This is a chronic problem.  He is being followed closely by the nephrologist.  His GFR remains below 60 and creatinine has crept up to 1.97.  He is due to see them next week.

## 2022-11-09 NOTE — PROGRESS NOTES
Subjective:     CC: Here for follow-up.  Patient states he is actually feeling very well.    HPI:   Jama presents today with the following medical concerns:    Diabetes mellitus with coincident hypertension (HCC)  This is a chronic problem.  Patient's last hemoglobin A1c was very good.  His blood pressure remains under good control as well.  His next hemoglobin A1c is due the middle of next month.    Stage 3b chronic kidney disease (HCC)  This is a chronic problem.  He is being followed closely by the nephrologist.  His GFR remains below 60 and creatinine has crept up to 1.97.  He is due to see them next week.    Past Medical History:   Diagnosis Date    Benign essential hypertension     Hyperlipoproteinemia     Hypertension     not on meds anymore    Pain     Polycystic kidney 9/10/10    RIGHT KIDNEY TRANSPLANT    Sleep apnea     Snoring        Social History     Tobacco Use    Smoking status: Never    Smokeless tobacco: Never   Vaping Use    Vaping Use: Never used   Substance Use Topics    Alcohol use: No    Drug use: No       Current Outpatient Medications Ordered in Epic   Medication Sig Dispense Refill    apixaban (ELIQUIS) 5mg Tab Take 1 Tablet by mouth 2 times a day. 180 Tablet 3    tadalafil (CIALIS) 5 MG tablet : TAKE 1 TABLETS 30 MINUTES BEFORE SEXUAL ACTIVITY, DO NOT EXCEED MORE THAN ONE DOSE A DAY 15 Tablet 3    gabapentin (NEURONTIN) 300 MG Cap Take 2 Capsules by mouth 2 (two) times a day. 360 Capsule 1    furosemide (LASIX) 20 MG Tab Take 1 Tablet by mouth 1 time a day as needed (leg swelling).      SITagliptin (JANUVIA) 50 MG Tab Take 1 Tablet by mouth every day. 100 Tablet 2    pioglitazone (ACTOS) 45 MG Tab Take 1 Tablet by mouth every day. 100 Tablet 3    mycophenolate (CELLCEPT) 250 MG Cap       carvedilol (COREG) 3.125 MG Tab Take 1 Tablet by mouth 2 times a day. Hold for sbp < 100 Hold for dbp < 60 200 Tablet 3    omeprazole (PRILOSEC) 20 MG delayed-release capsule Take 1 Capsule by mouth every  "day.      Coenzyme Q10 (COQ10) 200 MG Cap Take 1 Capsule by mouth every day.      mycophenolate (CELLCEPT) 500 MG tablet Take 1 Tablet by mouth 2 times a day. 0800 2000      acetaminophen (TYLENOL) 325 MG Tab Take 2 Tablets by mouth every four hours as needed for Mild Pain.      glyBURIDE (DIABETA) 5 MG Tab Take 2 Tablets by mouth every morning with breakfast. 2 tablets = 10 mg.      predniSONE (DELTASONE) 5 MG Tab Take 1 Tablet by mouth every day. 30 Tablet 0    tacrolimus (PROGRAF) 1 MG Cap Take 1 Capsule by mouth 2 times a day. (Patient taking differently: Take 1 Capsule by mouth 2 times a day. 0800 2000) 60 Capsule 3    atorvastatin (LIPITOR) 80 MG tablet Take 1 Tablet by mouth at bedtime. 90 Tablet 3     No current Epic-ordered facility-administered medications on file.       Allergies:  Doxycycline    Health Maintenance: Completed    ROS:  Gen: no fevers/chills, no changes in weight  Eyes: no changes in vision  ENT: no sore throat, no hearing loss, no bloody nose  Pulm: no sob, no cough  CV: no chest pain, no palpitations  GI: no nausea/vomiting, no diarrhea  : no dysuria  MSk: no myalgias  Skin: no rash  Neuro: no headaches, no numbness/tingling  Heme/Lymph: no easy bruising      Objective:       Exam:  /70 (BP Location: Left arm, Patient Position: Sitting, BP Cuff Size: Adult long)   Pulse 74   Temp 36.3 °C (97.4 °F) (Temporal)   Resp 16   Ht 1.956 m (6' 5\")   Wt 108 kg (238 lb)   SpO2 98%   BMI 28.22 kg/m²  Body mass index is 28.22 kg/m².    Gen: Alert and oriented, No apparent distress.  Neck: Neck is supple without lymphadenopathy.  Lungs: Normal effort, CTA bilaterally, no wheezes, rhonchi, or rales  CV: Regular rate and rhythm. No murmurs, rubs, or gallops.  Ext: No clubbing, cyanosis, edema.      Labs: Reviewed    Assessment & Plan:     66 y.o. male with the following -     1. Diabetes mellitus with coincident hypertension (HCC)  This is a chronic problem under good control.  His eye " exam was done today.  We will continue to follow.  We will recheck his hemoglobin A1c in December.  - POCT Retinal Eye Exam  - Referral to Pharmacotherapy Service    2. Stage 3b chronic kidney disease (HCC)  This is a chronic problem.  Continue close follow-up with his nephrologist.      Return in about 3 months (around 2/9/2023) for Long.    Please note that this dictation was created using voice recognition software. I have made every reasonable attempt to correct obvious errors, but I expect that there are errors of grammar and possibly content that I did not discover before finalizing the note.

## 2022-11-11 LAB — RETINAL SCREEN: NEGATIVE

## 2022-11-14 ENCOUNTER — DOCUMENTATION (OUTPATIENT)
Dept: VASCULAR LAB | Facility: MEDICAL CENTER | Age: 67
End: 2022-11-14
Payer: MEDICARE

## 2022-11-14 RX ORDER — ATORVASTATIN CALCIUM 80 MG/1
80 TABLET, FILM COATED ORAL
Qty: 100 TABLET | Refills: 1 | Status: SHIPPED | OUTPATIENT
Start: 2022-11-14 | End: 2022-12-06

## 2022-11-14 NOTE — TELEPHONE ENCOUNTER
Received request via: Pharmacy    Was the patient seen in the last year in this department? Yes    Does the patient have an active prescription (recently filled or refills available) for medication(s) requested? Yes.    Does the patient have [facility] and need 100 day supply (blood pressure, diabetes and cholesterol meds only)? Yes, quantity updated to 100 days

## 2022-11-14 NOTE — PROGRESS NOTES
[facility]    Received DM referral  from Dr. Benton to look into cost savings.    Reached out to Pretty w/ [facility] as she has been working w/ the pt. Pt makes over income for any pt assistance (over $500k). Will remove from referral list    Nelida Flores, PharmD

## 2022-12-06 ENCOUNTER — OFFICE VISIT (OUTPATIENT)
Dept: MEDICAL GROUP | Facility: PHYSICIAN GROUP | Age: 67
End: 2022-12-06
Payer: MEDICARE

## 2022-12-06 VITALS
TEMPERATURE: 97.3 F | SYSTOLIC BLOOD PRESSURE: 112 MMHG | OXYGEN SATURATION: 94 % | HEIGHT: 77 IN | BODY MASS INDEX: 29.37 KG/M2 | RESPIRATION RATE: 18 BRPM | HEART RATE: 80 BPM | WEIGHT: 248.7 LBS | DIASTOLIC BLOOD PRESSURE: 62 MMHG

## 2022-12-06 DIAGNOSIS — I70.0 ATHEROSCLEROSIS OF AORTA (HCC): ICD-10-CM

## 2022-12-06 DIAGNOSIS — N64.4 BREAST TENDERNESS IN MALE: ICD-10-CM

## 2022-12-06 DIAGNOSIS — G31.9 CEREBRAL ATROPHY (HCC): ICD-10-CM

## 2022-12-06 DIAGNOSIS — A08.4 VIRAL GASTROENTERITIS: ICD-10-CM

## 2022-12-06 DIAGNOSIS — I25.10 CORONARY ARTERY DISEASE DUE TO LIPID RICH PLAQUE: ICD-10-CM

## 2022-12-06 DIAGNOSIS — I25.83 CORONARY ARTERY DISEASE DUE TO LIPID RICH PLAQUE: ICD-10-CM

## 2022-12-06 DIAGNOSIS — E78.5 HYPERLIPIDEMIA, UNSPECIFIED HYPERLIPIDEMIA TYPE: ICD-10-CM

## 2022-12-06 PROBLEM — Z09 HOSPITAL DISCHARGE FOLLOW-UP: Status: RESOLVED | Noted: 2022-08-09 | Resolved: 2022-12-06

## 2022-12-06 PROCEDURE — 99214 OFFICE O/P EST MOD 30 MIN: CPT | Performed by: FAMILY MEDICINE

## 2022-12-06 RX ORDER — GABAPENTIN 300 MG/1
600 CAPSULE ORAL
COMMUNITY
Start: 2022-09-14 | End: 2022-12-06

## 2022-12-06 RX ORDER — GLYBURIDE 5 MG/1
10 TABLET ORAL
Qty: 90 TABLET | Refills: 3 | Status: SHIPPED | OUTPATIENT
Start: 2022-12-06 | End: 2023-03-02 | Stop reason: SDUPTHER

## 2022-12-06 ASSESSMENT — FIBROSIS 4 INDEX: FIB4 SCORE: 2.21

## 2022-12-06 NOTE — ASSESSMENT & PLAN NOTE
This is a new problem.  Patient states that last week he started having some mild abdominal discomfort followed by loose stools.  He had a loss of appetite as well.  This lasted several days and then started to get better and then last night restarted again.  He has been controlling the stools by taking Pepto-Bismol and Imodium.  He has not had any fevers, nausea or vomiting.  There is been no blood or mucus in the stool.  No recent travel.  He is on immuno suppressant therapy.

## 2022-12-06 NOTE — ASSESSMENT & PLAN NOTE
This is a new problem.  Patient has been having some tenderness to his nipples the last few weeks to months.  No recent changes in his medications.  No lumps have been palpable and no nipple discharge.

## 2022-12-07 NOTE — ASSESSMENT & PLAN NOTE
This is a chronic problem.  I am not sure if the patient is seeing the cardiologist in some time and we will check with him about that and if so make a referral if needed.  No current symptoms.

## 2022-12-07 NOTE — ASSESSMENT & PLAN NOTE
This is a chronic problem.  Patient was under the understanding that his atorvastatin was stopped when he got out of the hospital in July.  On review of his chart it appears that he was to continue on it.  We will let him know about that.

## 2022-12-07 NOTE — PROGRESS NOTES
Subjective:     CC: Here for several issues.    HPI:   Jama presents today with the following medical concerns:    Viral gastroenteritis  This is a new problem.  Patient states that last week he started having some mild abdominal discomfort followed by loose stools.  He had a loss of appetite as well.  This lasted several days and then started to get better and then last night restarted again.  He has been controlling the stools by taking Pepto-Bismol and Imodium.  He has not had any fevers, nausea or vomiting.  There is been no blood or mucus in the stool.  No recent travel.  He is on immuno suppressant therapy.    Breast tenderness in male  This is a new problem.  Patient has been having some tenderness to his nipples the last few weeks to months.  No recent changes in his medications.  No lumps have been palpable and no nipple discharge.    Atherosclerosis of aorta (HCC)  This is a chronic problem.  Is noted on previous x-ray studies.    CAD (coronary artery disease)  This is a chronic problem.  I am not sure if the patient is seeing the cardiologist in some time and we will check with him about that and if so make a referral if needed.  No current symptoms.    Cerebral atrophy (HCC)  This is a chronic problem.  Noted on previous x-ray study.  Likely due to aging.    Hyperlipidemia  This is a chronic problem.  Patient was under the understanding that his atorvastatin was stopped when he got out of the hospital in July.  On review of his chart it appears that he was to continue on it.  We will let him know about that.    Past Medical History:   Diagnosis Date    Benign essential hypertension     Hyperlipoproteinemia     Hypertension     not on meds anymore    Pain     Polycystic kidney 9/10/10    RIGHT KIDNEY TRANSPLANT    Sleep apnea     Snoring        Social History     Tobacco Use    Smoking status: Never    Smokeless tobacco: Never   Vaping Use    Vaping Use: Never used   Substance Use Topics    Alcohol use:  "No    Drug use: No       Current Outpatient Medications Ordered in Epic   Medication Sig Dispense Refill    glyBURIDE (DIABETA) 5 MG Tab Take 2 Tablets by mouth every morning with breakfast. 2 tablets = 10 mg. 90 Tablet 3    apixaban (ELIQUIS) 5mg Tab Take 1 Tablet by mouth 2 times a day. 180 Tablet 3    tadalafil (CIALIS) 5 MG tablet : TAKE 1 TABLETS 30 MINUTES BEFORE SEXUAL ACTIVITY, DO NOT EXCEED MORE THAN ONE DOSE A DAY 15 Tablet 3    gabapentin (NEURONTIN) 300 MG Cap Take 2 Capsules by mouth 2 (two) times a day. 360 Capsule 1    SITagliptin (JANUVIA) 50 MG Tab Take 1 Tablet by mouth every day. 100 Tablet 2    pioglitazone (ACTOS) 45 MG Tab Take 1 Tablet by mouth every day. 100 Tablet 3    mycophenolate (CELLCEPT) 250 MG Cap       omeprazole (PRILOSEC) 20 MG delayed-release capsule Take 1 Capsule by mouth every day.      predniSONE (DELTASONE) 5 MG Tab Take 1 Tablet by mouth every day. 30 Tablet 0    tacrolimus (PROGRAF) 1 MG Cap Take 1 Capsule by mouth 2 times a day. (Patient taking differently: Take 1 mg by mouth 2 times a day. 0800  2000) 60 Capsule 3     No current [facility]-ordered facility-administered medications on file.       Allergies:  Doxycycline    Health Maintenance: Completed    ROS:  Gen: no fevers/chills, no changes in weight  Eyes: no changes in vision  ENT: no sore throat, no hearing loss, no bloody nose  Pulm: no sob, no cough  CV: no chest pain, no palpitations  GI: no nausea/vomiting, no diarrhea  : no dysuria  MSk: no myalgias  Skin: no rash  Neuro: no headaches, no numbness/tingling  Heme/Lymph: no easy bruising      Objective:       Exam:  /62 (BP Location: Left arm, Patient Position: Sitting, BP Cuff Size: Large adult)   Pulse 80   Temp 36.3 °C (97.3 °F) (Temporal)   Resp 18   Ht 1.956 m (6' 5\")   Wt 113 kg (248 lb 11.2 oz)   SpO2 94%   BMI 29.49 kg/m²  Body mass index is 29.49 kg/m².    Gen: Alert and oriented, No apparent distress.  Neck: Neck is supple without " lymphadenopathy.  Lungs: Normal effort, CTA bilaterally, no wheezes, rhonchi, or rales  CV: Regular rate and rhythm. No murmurs, rubs, or gallops.  Breasts: No masses felt.  No nipple discharge seen.  No redness noted.  Abdomen: Soft, nontender, no organomegaly or masses.  Normal bowel sounds.  Ext: No clubbing, cyanosis, edema.      Assessment & Plan:     67 y.o. male with the following -     1. Breast tenderness in male  This is a new issue.  Patient told he may be due to his medications.  We will continue to monitor.  He is told if he notices any nipple discharge or feels any breast masses to let me know so we can investigate it further.    2. Viral gastroenteritis  This is a new problem.  Is told his symptoms are consistent with a viral gastroenteritis.  We will monitor him for the rest of this week but if his stools are still very loose and frequent into next week then we can get stool studies done.  He was told if he has worsening of symptoms particularly with persistent pain and fever go to the emergency room.    3. Coronary artery disease due to lipid rich plaque  This is a chronic problem.  He is due for cholesterol panel that will be ordered.  He also be told he needs to restart on his atorvastatin and see a cardiologist.  - Lipid Profile; Future    4. Cerebral atrophy (HCC)  This is a chronic problem likely due to aging.    5. Atherosclerosis of aorta (HCC)  This is a chronic problem.  Continue to follow.    6. Hyperlipidemia, unspecified hyperlipidemia type  This is a chronic problem.  Patient be notified that he should restart on his atorvastatin.      Return if symptoms worsen or fail to improve.    Please note that this dictation was created using voice recognition software. I have made every reasonable attempt to correct obvious errors, but I expect that there are errors of grammar and possibly content that I did not discover before finalizing the note.

## 2022-12-12 ENCOUNTER — HOSPITAL ENCOUNTER (OUTPATIENT)
Dept: LAB | Facility: MEDICAL CENTER | Age: 67
End: 2022-12-12
Attending: INTERNAL MEDICINE
Payer: MEDICARE

## 2022-12-12 LAB
ALBUMIN SERPL BCP-MCNC: 4.2 G/DL (ref 3.2–4.9)
APPEARANCE UR: CLEAR
BASOPHILS # BLD AUTO: 1.2 % (ref 0–1.8)
BASOPHILS # BLD: 0.07 K/UL (ref 0–0.12)
BILIRUB UR QL STRIP.AUTO: NEGATIVE
BUN SERPL-MCNC: 45 MG/DL (ref 8–22)
BURR CELLS BLD QL SMEAR: NORMAL
CALCIUM SERPL-MCNC: 9.1 MG/DL (ref 8.5–10.5)
CHLORIDE SERPL-SCNC: 109 MMOL/L (ref 96–112)
CO2 SERPL-SCNC: 23 MMOL/L (ref 20–33)
COLOR UR: YELLOW
COMMENT 1642: NORMAL
CREAT SERPL-MCNC: 1.79 MG/DL (ref 0.5–1.4)
CREAT UR-MCNC: 73.93 MG/DL
EOSINOPHIL # BLD AUTO: 0.14 K/UL (ref 0–0.51)
EOSINOPHIL NFR BLD: 2.3 % (ref 0–6.9)
ERYTHROCYTE [DISTWIDTH] IN BLOOD BY AUTOMATED COUNT: 55.3 FL (ref 35.9–50)
GFR SERPLBLD CREATININE-BSD FMLA CKD-EPI: 41 ML/MIN/1.73 M 2
GLUCOSE SERPL-MCNC: 93 MG/DL (ref 65–99)
GLUCOSE UR STRIP.AUTO-MCNC: NEGATIVE MG/DL
HCT VFR BLD AUTO: 44.1 % (ref 42–52)
HGB BLD-MCNC: 13.1 G/DL (ref 14–18)
IMM GRANULOCYTES # BLD AUTO: 0.04 K/UL (ref 0–0.11)
IMM GRANULOCYTES NFR BLD AUTO: 0.7 % (ref 0–0.9)
KETONES UR STRIP.AUTO-MCNC: NEGATIVE MG/DL
LEUKOCYTE ESTERASE UR QL STRIP.AUTO: NEGATIVE
LYMPHOCYTES # BLD AUTO: 1.14 K/UL (ref 1–4.8)
LYMPHOCYTES NFR BLD: 19 % (ref 22–41)
MAGNESIUM SERPL-MCNC: 1.7 MG/DL (ref 1.5–2.5)
MCH RBC QN AUTO: 27.1 PG (ref 27–33)
MCHC RBC AUTO-ENTMCNC: 29.7 G/DL (ref 33.7–35.3)
MCV RBC AUTO: 91.1 FL (ref 81.4–97.8)
MICRO URNS: NORMAL
MONOCYTES # BLD AUTO: 0.65 K/UL (ref 0–0.85)
MONOCYTES NFR BLD AUTO: 10.8 % (ref 0–13.4)
MORPHOLOGY BLD-IMP: NORMAL
NEUTROPHILS # BLD AUTO: 3.97 K/UL (ref 1.82–7.42)
NEUTROPHILS NFR BLD: 66 % (ref 44–72)
NITRITE UR QL STRIP.AUTO: NEGATIVE
NRBC # BLD AUTO: 0 K/UL
NRBC BLD-RTO: 0 /100 WBC
PH UR STRIP.AUTO: 6 [PH] (ref 5–8)
PHOSPHATE SERPL-MCNC: 3.1 MG/DL (ref 2.5–4.5)
PLATELET # BLD AUTO: 121 K/UL (ref 164–446)
PLATELET BLD QL SMEAR: NORMAL
PMV BLD AUTO: 13.4 FL (ref 9–12.9)
POIKILOCYTOSIS BLD QL SMEAR: NORMAL
POTASSIUM SERPL-SCNC: 5 MMOL/L (ref 3.6–5.5)
PROT UR QL STRIP: NEGATIVE MG/DL
PROT UR-MCNC: 7 MG/DL (ref 0–15)
PROT/CREAT UR: 95 MG/G (ref 15–68)
RBC # BLD AUTO: 4.84 M/UL (ref 4.7–6.1)
RBC BLD AUTO: PRESENT
RBC UR QL AUTO: NEGATIVE
SODIUM SERPL-SCNC: 140 MMOL/L (ref 135–145)
SP GR UR STRIP.AUTO: 1.02
URATE SERPL-MCNC: 6.5 MG/DL (ref 2.5–8.3)
UROBILINOGEN UR STRIP.AUTO-MCNC: 0.2 MG/DL
WBC # BLD AUTO: 6 K/UL (ref 4.8–10.8)

## 2022-12-12 PROCEDURE — 80069 RENAL FUNCTION PANEL: CPT

## 2022-12-12 PROCEDURE — 36415 COLL VENOUS BLD VENIPUNCTURE: CPT

## 2022-12-12 PROCEDURE — 82570 ASSAY OF URINE CREATININE: CPT

## 2022-12-12 PROCEDURE — 84550 ASSAY OF BLOOD/URIC ACID: CPT

## 2022-12-12 PROCEDURE — 80197 ASSAY OF TACROLIMUS: CPT

## 2022-12-12 PROCEDURE — 81003 URINALYSIS AUTO W/O SCOPE: CPT

## 2022-12-12 PROCEDURE — 85025 COMPLETE CBC W/AUTO DIFF WBC: CPT

## 2022-12-12 PROCEDURE — 84156 ASSAY OF PROTEIN URINE: CPT

## 2022-12-12 PROCEDURE — 83735 ASSAY OF MAGNESIUM: CPT

## 2022-12-14 LAB — TACROLIMUS BLD-MCNC: 6.9 NG/ML

## 2022-12-16 DIAGNOSIS — R19.7 DIARRHEA, UNSPECIFIED TYPE: ICD-10-CM

## 2022-12-16 DIAGNOSIS — I25.2 HISTORY OF MI (MYOCARDIAL INFARCTION): ICD-10-CM

## 2022-12-16 DIAGNOSIS — I10 PRIMARY HYPERTENSION: ICD-10-CM

## 2022-12-16 DIAGNOSIS — Z95.5 S/P INSERTION OF NON-DRUG ELUTING CORONARY ARTERY STENT: ICD-10-CM

## 2022-12-16 DIAGNOSIS — Z86.79 HISTORY OF ATRIAL FIBRILLATION: ICD-10-CM

## 2022-12-16 DIAGNOSIS — I25.10 CORONARY ARTERY DISEASE DUE TO LIPID RICH PLAQUE: ICD-10-CM

## 2022-12-16 DIAGNOSIS — I25.83 CORONARY ARTERY DISEASE DUE TO LIPID RICH PLAQUE: ICD-10-CM

## 2022-12-19 ENCOUNTER — HOSPITAL ENCOUNTER (OUTPATIENT)
Facility: MEDICAL CENTER | Age: 67
End: 2022-12-19
Attending: FAMILY MEDICINE
Payer: MEDICARE

## 2022-12-19 DIAGNOSIS — R19.7 DIARRHEA, UNSPECIFIED TYPE: ICD-10-CM

## 2022-12-19 LAB
C DIFF DNA SPEC QL NAA+PROBE: NEGATIVE
C DIFF TOX GENS STL QL NAA+PROBE: NEGATIVE
H PYLORI AG STL QL IA: NOT DETECTED

## 2022-12-19 PROCEDURE — 87493 C DIFF AMPLIFIED PROBE: CPT

## 2022-12-19 PROCEDURE — 87338 HPYLORI STOOL AG IA: CPT

## 2022-12-19 PROCEDURE — 87045 FECES CULTURE AEROBIC BACT: CPT

## 2022-12-19 PROCEDURE — 87899 AGENT NOS ASSAY W/OPTIC: CPT | Mod: 91

## 2022-12-20 LAB
E COLI SXT1+2 STL IA: NORMAL
SIGNIFICANT IND 70042: NORMAL
SITE SITE: NORMAL
SOURCE SOURCE: NORMAL

## 2023-01-16 ENCOUNTER — ANTICOAGULATION VISIT (OUTPATIENT)
Dept: MEDICAL GROUP | Facility: PHYSICIAN GROUP | Age: 68
End: 2023-01-16
Payer: MEDICARE

## 2023-01-16 DIAGNOSIS — Z79.01 LONG TERM (CURRENT) USE OF ANTICOAGULANTS: Primary | ICD-10-CM

## 2023-01-16 DIAGNOSIS — Z86.79 HISTORY OF ATRIAL FIBRILLATION: ICD-10-CM

## 2023-01-16 PROCEDURE — 99211 OFF/OP EST MAY X REQ PHY/QHP: CPT | Performed by: INTERNAL MEDICINE

## 2023-01-16 NOTE — PROGRESS NOTES
Anticoagulation Summary  As of 1/16/2023      INR goal:     TTR:  61.4 % (2.4 mo)   Prior goal:  2.0-3.0   INR used for dosing:     Warfarin maintenance plan:  No maintenance plan   Plan last modified:  Nelida Flores, PharmD (10/10/2022)   Next INR check:  7/10/2023   Target end date:  Indefinite    Indications    History of atrial fibrillation [Z86.79]  Acute deep vein thrombosis (DVT) of left upper extremity (HCC) [I82.622]  Long term (current) use of anticoagulants [Z79.01]                 Anticoagulation Episode Summary       INR check location:      Preferred lab:      Send INR reminders to:      Comments:  Switch back to Eliquis for AF if DVT is resolved after 3 months (around 10/25/22)          Anticoagulation Care Providers       Provider Role Specialty Phone number    [facility] Responsible  [phone]             Target end date:Indefinite     Indication: Afib & DVT     Drug: Eliquis     CHADsVASC = at least 7    Health Status Since Last Assessment   Patient denies any new relevant medical problems, ED visits or hospitalizations   Patient denies any embolic events (stroke/tia/systemic embolism)    Adherence with DOAC Therapy   Pt has NOT missed any doses in the average week    Bleeding Risk Assessment     NONE Epistaxis   Pt denies any excessive or unusual bleeding/hematomas.  Pt denies any GI bleeds or hematemesis.  Pt denies any concerning daily headache or sub dural hematoma symptoms.     Pt denies any hematuria NONE   Latest Hemoglobin 13.1   ETOH overuse NO     Creatinine Clearance/Renal Function     Latest ClCr > 30 mL/min    Hepatic function   Latest LFTs WNL   Pt denies any history of liver dysfunction      Drug Interactions   Platelets: 121   ASA/other antiplatelets none   NSAID none   Other drug interactions none   none Verified no anticonvulsant or azole therapy, education provided for future use.     Examination   Blood Pressure declined   Symptomatic hypotension  none   Significant gait impairment/imbalance/high risk for falls? none    Final Assessment and Recommendations:   Patient appears stable from the anticoagulation standpoint.     Benefits of continued DOAC therapy outweigh risks for this patient   Recommend pt continue with current DOAC therapy Eliquis 5 mg BID (with dose adjustment)   DOAC is affordable     Other Actions: cmp/ cbc hemogram ordered prior to next visit    Follow up:   Will follow up with patient 6  months.     Riccardo Wall, Pharmacy Intern  Scotty Faith, PharmD

## 2023-01-17 ENCOUNTER — OFFICE VISIT (OUTPATIENT)
Dept: CARDIOLOGY | Facility: MEDICAL CENTER | Age: 68
End: 2023-01-17
Attending: FAMILY MEDICINE
Payer: MEDICARE

## 2023-01-17 VITALS
SYSTOLIC BLOOD PRESSURE: 132 MMHG | OXYGEN SATURATION: 95 % | DIASTOLIC BLOOD PRESSURE: 78 MMHG | RESPIRATION RATE: 16 BRPM | WEIGHT: 242 LBS | HEART RATE: 74 BPM | BODY MASS INDEX: 28.57 KG/M2 | HEIGHT: 77 IN

## 2023-01-17 DIAGNOSIS — I48.91 HYPERCOAGULABILITY DUE TO ATRIAL FIBRILLATION (HCC): ICD-10-CM

## 2023-01-17 DIAGNOSIS — I25.10 CORONARY ARTERY DISEASE DUE TO LIPID RICH PLAQUE: ICD-10-CM

## 2023-01-17 DIAGNOSIS — I48.0 PAF (PAROXYSMAL ATRIAL FIBRILLATION) (HCC): ICD-10-CM

## 2023-01-17 DIAGNOSIS — I25.83 CORONARY ARTERY DISEASE DUE TO LIPID RICH PLAQUE: ICD-10-CM

## 2023-01-17 DIAGNOSIS — I25.2 HISTORY OF MI (MYOCARDIAL INFARCTION): ICD-10-CM

## 2023-01-17 DIAGNOSIS — D68.69 HYPERCOAGULABILITY DUE TO ATRIAL FIBRILLATION (HCC): ICD-10-CM

## 2023-01-17 DIAGNOSIS — E78.5 HYPERLIPIDEMIA, UNSPECIFIED HYPERLIPIDEMIA TYPE: ICD-10-CM

## 2023-01-17 DIAGNOSIS — I10 PRIMARY HYPERTENSION: ICD-10-CM

## 2023-01-17 DIAGNOSIS — I70.0 ATHEROSCLEROSIS OF AORTA (HCC): ICD-10-CM

## 2023-01-17 PROCEDURE — 99204 OFFICE O/P NEW MOD 45 MIN: CPT | Performed by: INTERNAL MEDICINE

## 2023-01-17 RX ORDER — ATORVASTATIN CALCIUM 80 MG/1
80 TABLET, FILM COATED ORAL NIGHTLY
COMMUNITY
End: 2023-05-03

## 2023-01-17 RX ORDER — METOPROLOL SUCCINATE 25 MG/1
25 TABLET, EXTENDED RELEASE ORAL DAILY
Qty: 90 TABLET | Refills: 3 | Status: SHIPPED | OUTPATIENT
Start: 2023-01-17 | End: 2024-01-03 | Stop reason: SDUPTHER

## 2023-01-17 ASSESSMENT — FIBROSIS 4 INDEX: FIB4 SCORE: 2.54

## 2023-01-17 NOTE — PROGRESS NOTES
Cardiology Initial Consultation Note    Date of note:    1/17/2023    Primary Care Provider: Ganesh Benton III, M.D.  Referring Provider: Ganesh Benton III, M.*     Patient Name: Jama Altman   YOB: 1955  MRN:              [id number]    Chief Complaint   Patient presents with    Coronary Artery Disease    Hyperlipidemia       History of Present Illness: Mr. Jama Altman is a 67-year-old man with a past medical history significant for coronary artery disease, history of acute inferior myocardial infarction status post BMS to RCA in 2013 at [facility], hyperlipidemia, hypertension, paroxysmal atrial fibrillation on systemic anticoagulation, type 2 diabetes, history of kidney transplant on immunosuppression, CKD stage III with baseline creatinine of 1.6-1.9 who presents to the cardiovascular office to establish care after being lost to cardiac follow-up.    Patient states that back in May July 2021, he had multiple hospitalizations for urinary tract infection and recurrent sepsis.  He was noted to have moderately reduced LV systolic function in May 2022 with an LVEF of 35%.  LV function recovered on subsequent transthoracic echocardiogram to 45%.    Today, he has no major complaints.  He is currently doing well from a cardiovascular standpoint.  He denies having chest pain, shortness of breath or palpitations.  Also denies having orthopnea, PND or lower extremity edema.  He has been taking his Eliquis as prescribed for his paroxysmal atrial fibrillation.       Cardiovascular Risk Factors:  1. Smoking status: Denies  2. Type II Diabetes Mellitus: Known history of diabetes, on oral medications  Lab Results   Component Value Date/Time    HBA1C 6.1 (H) 08/31/2022 07:12 AM    HBA1C 6.1 (A) 08/31/2022 07:12 AM    HBA1C 6.2 (H) 07/26/2022 12:31 AM     3. Hypertension: Known history  4. Dyslipidemia: On atorvastatin  Cholesterol,Tot   Date Value Ref Range Status   05/18/2021 127 100 - 199  mg/dL Final     LDL   Date Value Ref Range Status   05/18/2021 47 <100 mg/dL Final     HDL   Date Value Ref Range Status   05/18/2021 52 >=40 mg/dL Final     Triglycerides   Date Value Ref Range Status   05/29/2022 94 0 - 149 mg/dL Final     5. Family history of early Coronary Artery Disease in a first degree relative (Male less than 55 years of age; Female less than 65 years of age): Denies      Review of Systems:  As per HPI. All other systems reviewed and are negative.      Past Medical History:   Diagnosis Date    Benign essential hypertension     Hyperlipoproteinemia     Hypertension     not on meds anymore    Pain     Polycystic kidney 9/10/10    RIGHT KIDNEY TRANSPLANT    Sleep apnea     Snoring          Past Surgical History:   Procedure Laterality Date    KNEE MANIPULATION  2/16/2012    Performed by LATOYA CONNER at SURGERY [facility]S    KNEE UNICOMPARTMENTAL  12/23/2011    Performed by LATOYA CONNER at SURGERY [facility] ORS    KNEE ARTHROSCOPY  12/23/2011    Performed by LATOYA CONNER at SURGERY [facility]    KNEE ARTHROSCOPY  5/3/2011    Performed by HANANE GOLDMAN at SURGERY SAME DAY [facility]    MENISCECTOMY, KNEE, MEDIAL  5/3/2011    Performed by HANANE GOLDMAN at SURGERY SAME DAY [facility] ORS    OTHER  9/10/10    RIGHT KIDNEY TRANSPLANT    KNEE ARTHROPLASTY TOTAL  1/12/07    RIGHT    KNEE ARTHROSCOPY  4/10/06    RIGHT    OTHER ORTHOPEDIC SURGERY  7/8/74    LEFT KNEE DEBRIDEMENT         Current Outpatient Medications   Medication Sig Dispense Refill    atorvastatin (LIPITOR) 80 MG tablet Take 80 mg by mouth every evening.      metoprolol SR (TOPROL XL) 25 MG TABLET SR 24 HR Take 1 Tablet by mouth every day. 90 Tablet 3    glyBURIDE (DIABETA) 5 MG Tab Take 2 Tablets by mouth every morning with breakfast. 2 tablets = 10 mg. 90 Tablet 3    apixaban (ELIQUIS) 5mg Tab Take 1 Tablet by mouth 2 times a day. 180 Tablet 3    tadalafil (CIALIS) 5 MG tablet : TAKE 1 TABLETS 30 MINUTES BEFORE  SEXUAL ACTIVITY, DO NOT EXCEED MORE THAN ONE DOSE A DAY 15 Tablet 3    gabapentin (NEURONTIN) 300 MG Cap Take 2 Capsules by mouth 2 (two) times a day. 360 Capsule 1    SITagliptin (JANUVIA) 50 MG Tab Take 1 Tablet by mouth every day. 100 Tablet 2    pioglitazone (ACTOS) 45 MG Tab Take 1 Tablet by mouth every day. 100 Tablet 3    mycophenolate (CELLCEPT) 250 MG Cap       omeprazole (PRILOSEC) 20 MG delayed-release capsule Take 1 Capsule by mouth every day.      predniSONE (DELTASONE) 5 MG Tab Take 1 Tablet by mouth every day. 30 Tablet 0    tacrolimus (PROGRAF) 1 MG Cap Take 1 Capsule by mouth 2 times a day. (Patient taking differently: Take 1 mg by mouth 2 times a day. 0800  2000) 60 Capsule 3     No current facility-administered medications for this visit.         Allergies   Allergen Reactions    Doxycycline Rash     Sweats and shakes: 9/28/17: Clarified allergy with patient. Allergy was in 1998 and he doesn't remember what happened. He thought the medication is for pain.  Tolerates doxycycline 9/2017         History reviewed. No pertinent family history.      Social History     Socioeconomic History    Marital status:      Spouse name: Not on file    Number of children: Not on file    Years of education: Not on file    Highest education level: Not on file   Occupational History    Not on file   Tobacco Use    Smoking status: Never    Smokeless tobacco: Never   Vaping Use    Vaping Use: Never used   Substance and Sexual Activity    Alcohol use: No    Drug use: No    Sexual activity: Yes     Partners: Female   Other Topics Concern    Not on file   Social History Narrative    Not on file     Social Determinants of Health     Financial Resource Strain: Not on file   Food Insecurity: Not on file   Transportation Needs: Not on file   Physical Activity: Not on file   Stress: Not on file   Social Connections: Not on file   Intimate Partner Violence: Not on file   Housing Stability: Not on file         Physical  "Exam:  Ambulatory Vitals  /78 (BP Location: Right arm, Patient Position: Sitting, BP Cuff Size: Adult)   Pulse 74   Resp 16   Ht 1.956 m (6' 5\")   Wt 110 kg (242 lb)   SpO2 95%    Oxygen Therapy:  Pulse Oximetry: 95 %  BP Readings from Last 4 Encounters:   01/17/23 132/78   12/06/22 112/62   11/09/22 118/70   09/20/22 120/80       Weight/BMI: Body mass index is 28.7 kg/m².  Wt Readings from Last 4 Encounters:   01/17/23 110 kg (242 lb)   12/06/22 113 kg (248 lb 11.2 oz)   11/09/22 108 kg (238 lb)   09/20/22 103 kg (228 lb)         General: Not in acute distress, sitting comfortably in chair  EYES: No jaundice  HEENT: OP clear   Neck:  No carotid bruits, No JVD appreciated  CVS:  RRR, Normal S1, S2. No murmurs, rubs or gallops  Resp: Normal respiratory effort, lungs CTA bilaterally. No rales or rhonchi  Abdomen: Soft, non-distended, non-tender to palpation, no guarding or rigidity  Skin: No obvious rashes, no cyanosis  Neurological: Alert and oriented x3, moves all extremities, no focal neurologic deficits  Psychiatric: Appropriate affect  Extremities:   Extremities warm, 2+ bilateral radial pulses.  2+ bilateral dp pulses, no significant lower extremity edema      Lab Data Review:  Lab Results   Component Value Date/Time    CHOLSTRLTOT 127 05/18/2021 07:26 AM    LDL 47 05/18/2021 07:26 AM    HDL 52 05/18/2021 07:26 AM    TRIGLYCERIDE 94 05/29/2022 04:20 AM       Lab Results   Component Value Date/Time    SODIUM 140 12/12/2022 08:08 AM    POTASSIUM 5.0 12/12/2022 08:08 AM    CHLORIDE 109 12/12/2022 08:08 AM    CO2 23 12/12/2022 08:08 AM    GLUCOSE 93 12/12/2022 08:08 AM    BUN 45 (H) 12/12/2022 08:08 AM    CREATININE 1.79 (H) 12/12/2022 08:08 AM    CREATININE 2.4 (H) 12/18/2006 06:20 AM     Lab Results   Component Value Date/Time    ALKPHOSPHAT 105 (H) 11/01/2022 07:00 AM    ASTSGOT 18 11/01/2022 07:00 AM    ALTSGPT 15 11/01/2022 07:00 AM    TBILIRUBIN 0.4 11/01/2022 07:00 AM      Lab Results   Component " Value Date/Time    WBC 6.0 12/12/2022 08:08 AM     Lab Results   Component Value Date/Time    HBA1C 6.1 (H) 08/31/2022 07:12 AM    HBA1C 6.1 (A) 08/31/2022 07:12 AM    HBA1C 6.2 (H) 07/26/2022 12:31 AM         Cardiac Imaging and Procedures Review:    EKG dated (8/1/22): My personal interpretation is sinus rhythm with right bundle branch block, LPFB, PVC, APC    Echo dated (7/2/2022):   Mildly reduced LV systolic function with an EF of 45%, trace TR      Assessment & Plan     1. Coronary artery disease due to lipid rich plaque  Lipid Profile      2. History of MI (myocardial infarction)        3. Hyperlipidemia, unspecified hyperlipidemia type  Lipid Profile      4. PAF (paroxysmal atrial fibrillation) ([facility])        5. Hypercoagulability due to atrial fibrillation (HCC)        6. Primary hypertension        7. Atherosclerosis of aorta ([facility])              Medical Decision Making:  Patient is a 67-year-old man with a PMH of CAD, history of acute inferior myocardial infarction s/p BMS to RCA in 2013 at [facility], hyperlipidemia, hypertension, PAF on eliquis, DM2, history of kidney transplant on immunosuppression, CKD stage III with baseline creatinine of 1.6-1.9 who presents to the cardiovascular office to establish care after being lost to cardiac follow-up.    1. Coronary artery disease due to lipid rich plaque  2. History of MI (myocardial infarction)  - Mr. Altman is doing well from a cardiovascular standpoint he has no complaints of angina or exertional symptoms at this time.  He is currently being maintained on high intensity atorvastatin 80 mg daily.  His last lipid panel showed LDL is currently at goal of less than 70.  However this was 1 year prior.  We will repeat a lipid panel in the coming weeks.  -Review of recent echocardiogram shows mild LV dysfunction with EF of 45%.  Not currently on GDMT.  We will initiate therapy with metoprolol 25 mg daily.  Unable to initiate ACE/ARB/ARNI due to kidney  dysfunction and CKD    3. Hyperlipidemia, unspecified hyperlipidemia type  -Check lipid panel as above.  Continue atorvastatin 80 mg daily    4. PAF (paroxysmal atrial fibrillation) (HCC)  -Remains stable.  Given his elevated [id number] score he will be maintained on Eliquis for systemic anticoagulation and long-term thromboembolism prophylaxis.  -Start metoprolol 25 mg daily    5. Hypercoagulability due to atrial fibrillation (HCC)  - Continue Eliquis    6. Primary hypertension  - Blood pressure mildly elevated in office today with -140. Start Metoprolol 25mg daily as above.    7. Atherosclerosis of aorta (HCC)  - Calcification of aorta on CXR. Continue high intensity atorvastatin therapy.      It was a pleasure seeing Mr. Jama Altman in the office today. Return in about 1 year (around 1/17/2024) for Annual visit. Patient is aware to call the cardiology clinic with any questions or concerns.      Aly Barrera MD  Cardiologist, [facility] Heart and Vascular [facility], [facility].  [address] [place] [id number]  Phone: [phone]  Fax: [phone]    Please note that this dictation was created using voice recognition software. I have made every reasonable attempt to correct obvious errors, but it is possible there are errors of grammar and possibly content that I did not discover before finalizing the note.

## 2023-01-23 ENCOUNTER — HOSPITAL ENCOUNTER (OUTPATIENT)
Dept: LAB | Facility: MEDICAL CENTER | Age: 68
End: 2023-01-23
Attending: INTERNAL MEDICINE
Payer: MEDICARE

## 2023-01-23 DIAGNOSIS — I25.83 CORONARY ARTERY DISEASE DUE TO LIPID RICH PLAQUE: ICD-10-CM

## 2023-01-23 DIAGNOSIS — I25.10 CORONARY ARTERY DISEASE DUE TO LIPID RICH PLAQUE: ICD-10-CM

## 2023-01-23 DIAGNOSIS — E78.5 HYPERLIPIDEMIA, UNSPECIFIED HYPERLIPIDEMIA TYPE: ICD-10-CM

## 2023-01-23 LAB
CHOLEST SERPL-MCNC: 117 MG/DL (ref 100–199)
FASTING STATUS PATIENT QL REPORTED: NORMAL
HDLC SERPL-MCNC: 50 MG/DL
LDLC SERPL CALC-MCNC: 49 MG/DL
TRIGL SERPL-MCNC: 90 MG/DL (ref 0–149)

## 2023-01-23 PROCEDURE — 36415 COLL VENOUS BLD VENIPUNCTURE: CPT

## 2023-01-23 PROCEDURE — 80061 LIPID PANEL: CPT

## 2023-01-24 ENCOUNTER — OFFICE VISIT (OUTPATIENT)
Dept: MEDICAL GROUP | Facility: PHYSICIAN GROUP | Age: 68
End: 2023-01-24
Payer: MEDICARE

## 2023-01-24 VITALS
OXYGEN SATURATION: 98 % | DIASTOLIC BLOOD PRESSURE: 64 MMHG | HEIGHT: 77 IN | BODY MASS INDEX: 28.34 KG/M2 | RESPIRATION RATE: 18 BRPM | TEMPERATURE: 97.4 F | HEART RATE: 62 BPM | SYSTOLIC BLOOD PRESSURE: 102 MMHG | WEIGHT: 240 LBS

## 2023-01-24 DIAGNOSIS — Q61.3 POLYCYSTIC KIDNEY: ICD-10-CM

## 2023-01-24 DIAGNOSIS — R19.7 DIARRHEA, UNSPECIFIED TYPE: ICD-10-CM

## 2023-01-24 PROBLEM — Z79.52 LONG TERM (CURRENT) USE OF SYSTEMIC STEROIDS: Status: ACTIVE | Noted: 2022-06-22

## 2023-01-24 PROBLEM — I25.2 OLD MYOCARDIAL INFARCTION: Status: ACTIVE | Noted: 2022-06-22

## 2023-01-24 PROBLEM — M62.81 MUSCLE WEAKNESS (GENERALIZED): Status: ACTIVE | Noted: 2022-07-06

## 2023-01-24 PROBLEM — S81.801D UNSPECIFIED OPEN WOUND, RIGHT LOWER LEG, SUBSEQUENT ENCOUNTER: Status: ACTIVE | Noted: 2022-06-22

## 2023-01-24 PROBLEM — Z74.09 OTHER REDUCED MOBILITY: Status: ACTIVE | Noted: 2022-07-06

## 2023-01-24 PROBLEM — R41.841 COGNITIVE COMMUNICATION DEFICIT: Status: ACTIVE | Noted: 2022-07-06

## 2023-01-24 PROBLEM — E11.40 TYPE 2 DIABETES MELLITUS WITH DIABETIC NEUROPATHY, UNSPECIFIED (HCC): Status: ACTIVE | Noted: 2022-06-22

## 2023-01-24 PROBLEM — R26.2 DIFFICULTY IN WALKING, NOT ELSEWHERE CLASSIFIED: Status: ACTIVE | Noted: 2022-07-06

## 2023-01-24 PROBLEM — Z16.12 EXTENDED SPECTRUM BETA LACTAMASE (ESBL) RESISTANCE: Status: ACTIVE | Noted: 2022-07-06

## 2023-01-24 PROCEDURE — 99214 OFFICE O/P EST MOD 30 MIN: CPT | Performed by: PHYSICIAN ASSISTANT

## 2023-01-24 ASSESSMENT — FIBROSIS 4 INDEX: FIB4 SCORE: 2.54

## 2023-01-24 ASSESSMENT — PATIENT HEALTH QUESTIONNAIRE - PHQ9: CLINICAL INTERPRETATION OF PHQ2 SCORE: 0

## 2023-01-24 NOTE — PROGRESS NOTES
Annual Health Assessment Questions:    1.  Are you currently engaging in any exercise or physical activity? Yes    2.  How would you describe your mood or emotional well-being today? good    3.  Have you had any falls in the last year? No    4.  Have you noticed any problems with your balance or had difficulty walking? No    5.  In the last six months have you experienced any leakage of urine? No    6. DPA/Advanced Directive: Patient does not have an Advance Directive.  A packet and workshop information was given on Advance Directives.

## 2023-01-24 NOTE — PROGRESS NOTES
Subjective:     CC: Loose stools    HPI:   Jama presents today with concerns for some ongoing intestinal issues he has been experiencing.  States that since the end of November he has had loose and uncontrolled bowel movements.  States his symptoms have not improved at all.  Denies any blood in his stool. Does have some mucus in stool in the beginning but it is better.. No fevers, nausea, vomiting. Feels like it is reactive to diet, but no specific triggers. No family history of IBD    Past Medical History:   Diagnosis Date    Benign essential hypertension     Hyperlipoproteinemia     Hypertension     not on meds anymore    Pain     Polycystic kidney 9/10/10    RIGHT KIDNEY TRANSPLANT    Sleep apnea     Snoring        Social History     Tobacco Use    Smoking status: Never    Smokeless tobacco: Never   Vaping Use    Vaping Use: Never used   Substance Use Topics    Alcohol use: No    Drug use: No       Current Outpatient Medications Ordered in Epic   Medication Sig Dispense Refill    atorvastatin (LIPITOR) 80 MG tablet Take 80 mg by mouth every evening.      metoprolol SR (TOPROL XL) 25 MG TABLET SR 24 HR Take 1 Tablet by mouth every day. 90 Tablet 3    glyBURIDE (DIABETA) 5 MG Tab Take 2 Tablets by mouth every morning with breakfast. 2 tablets = 10 mg. (Patient taking differently: Take 10 mg by mouth every morning with breakfast. 2 tablets = 10 mg.    Pt stated he takes 2 tablets in the morning and 2 tablets in the evening. Total of 20 MG a day) 90 Tablet 3    apixaban (ELIQUIS) 5mg Tab Take 1 Tablet by mouth 2 times a day. 180 Tablet 3    tadalafil (CIALIS) 5 MG tablet : TAKE 1 TABLETS 30 MINUTES BEFORE SEXUAL ACTIVITY, DO NOT EXCEED MORE THAN ONE DOSE A DAY 15 Tablet 3    gabapentin (NEURONTIN) 300 MG Cap Take 2 Capsules by mouth 2 (two) times a day. 360 Capsule 1    SITagliptin (JANUVIA) 50 MG Tab Take 1 Tablet by mouth every day. 100 Tablet 2    pioglitazone (ACTOS) 45 MG Tab Take 1 Tablet by mouth every day.  "100 Tablet 3    mycophenolate (CELLCEPT) 250 MG Cap Pt stated he takes 2 tablets in the morning and 2 tablets in the evening. Total of 1000 MG a day      omeprazole (PRILOSEC) 20 MG delayed-release capsule Take 1 Capsule by mouth every day.      predniSONE (DELTASONE) 5 MG Tab Take 1 Tablet by mouth every day. 30 Tablet 0    tacrolimus (PROGRAF) 1 MG Cap Take 1 Capsule by mouth 2 times a day. (Patient taking differently: Take 1 mg by mouth 2 times a day. 0800  2000) 60 Capsule 3     No current [facility]-ordered facility-administered medications on file.       Allergies:  Doxycycline    Health Maintenance: Completed    ROS:  Gen: no fevers/chills  Eyes: no changes in vision  ENT: no sore throat  Pulm: no sob, no cough  CV: no chest pain  GI: no nausea/vomiting, pos for diarrhea   : no dysuria  MSk: no myalgias  Skin: no rash  Neuro: no headaches  Psych: no depression, no anxiety    Objective:       Exam:  /64   Pulse 62   Temp 36.3 °C (97.4 °F) (Temporal)   Resp 18   Ht 1.956 m (6' 5\")   Wt 109 kg (240 lb)   SpO2 98%   BMI 28.46 kg/m²  Body mass index is 28.46 kg/m².    Gen: Alert and oriented, No apparent distress.  Skin: Warm, dry, good turgor, no rashes in visible areas.  HEENT: Normocephalic. Eyes conjunctiva clear lids without ptosis, pupils equal and reactive to light accommodation, ears normal shape and contour  Neck: Trachea midline, no masses, no thyromegaly  Lungs: Normal effort, CTA bilaterally, no wheezes, rhonchi, or rales  CV: Regular rate and rhythm. No murmurs, rubs, or gallops.  MSK: Normal gait, moves all extremities.  Abdomen: bowel sounds present, soft, nondistended, nontender throughout, no peritoneal signs, no CVA tenderness bilaterally, no hepatosplenomegaly  Neuro: Grossly non-focal.  Ext: No clubbing, cyanosis, edema.  Psych: Alert and oriented x3, normal affect and mood.     Labs: Labs from 12/19/2022 were reviewed and discussed with the patient. All questions were answered. "     Imaging:  CT abdomen pelvis from 7/25/2022     IMPRESSION:        1.  Fluid-filled prominence of small bowel, consider ileus and/or enteritis. Radiographic follow-up to resolution recommended as clinically appropriate to exclude progression to obstructive changes.  2.  Periportal edema, nonspecific, can be associated with acute hepatitis in the appropriate clinical setting.  3.  Changes of the left kidney most compatible with polycystic kidney disease.  4.  3.0 cm fusiform infrarenal abdominal aortic aneurysm, radiographic follow-up and surveillance recommended as clinically appropriate.  5.  Fat-containing left inguinal hernia  6.  Atherosclerosis and atherosclerotic coronary artery disease    Assessment & Plan:     67 y.o. male with the following -     1. Diarrhea, unspecified type  Chronic, worsening.  Referral to GI placed for further evaluation and management of his symptoms.  Patient did have a CT scan completed in July of last year that did have some abnormalities so because of his new GI symptoms we will get an updated CT scan in the meantime to further evaluate.  Patient is nontoxic-appearing and his abdominal exam is normal.  Follow-up on imaging or sooner for any new or worsening concerns.  - Referral to Gastroenterology  - CT-ABDOMEN-PELVIS WITH & W/O; Future    2. Polycystic kidney  Patient is status post kidney transplant and has polycystic kidney on his left kidney.  Managed by specialist.    I spent a total of 30 minutes with record review (including external notes and labs), exam, communication with the patient, communication with other providers, and documentation of this encounter.     Return if symptoms worsen or fail to improve, for f/u imaging.    Please note that this dictation was created using voice recognition software. I have made every reasonable attempt to correct obvious errors, but I expect that there are errors of grammar and possibly content that I did not discover before  finalizing the note.    Electronically signed by Camille Warren PA-C on January 24, 2023

## 2023-01-25 DIAGNOSIS — R19.7 DIARRHEA, UNSPECIFIED TYPE: ICD-10-CM

## 2023-01-27 ENCOUNTER — HOSPITAL ENCOUNTER (OUTPATIENT)
Dept: RADIOLOGY | Facility: MEDICAL CENTER | Age: 68
End: 2023-01-27
Attending: PHYSICIAN ASSISTANT
Payer: MEDICARE

## 2023-01-27 DIAGNOSIS — R19.7 DIARRHEA, UNSPECIFIED TYPE: ICD-10-CM

## 2023-01-27 PROCEDURE — 74177 CT ABD & PELVIS W/CONTRAST: CPT

## 2023-01-27 PROCEDURE — 700117 HCHG RX CONTRAST REV CODE 255: Performed by: PHYSICIAN ASSISTANT

## 2023-01-27 RX ADMIN — IOHEXOL 80 ML: 350 INJECTION, SOLUTION INTRAVENOUS at 09:00

## 2023-02-09 ENCOUNTER — OFFICE VISIT (OUTPATIENT)
Dept: MEDICAL GROUP | Facility: PHYSICIAN GROUP | Age: 68
End: 2023-02-09
Payer: MEDICARE

## 2023-02-09 VITALS
RESPIRATION RATE: 18 BRPM | TEMPERATURE: 97.5 F | HEIGHT: 77 IN | DIASTOLIC BLOOD PRESSURE: 68 MMHG | WEIGHT: 242 LBS | BODY MASS INDEX: 28.57 KG/M2 | SYSTOLIC BLOOD PRESSURE: 118 MMHG | OXYGEN SATURATION: 95 % | HEART RATE: 64 BPM

## 2023-02-09 DIAGNOSIS — K59.1 FUNCTIONAL DIARRHEA: ICD-10-CM

## 2023-02-09 DIAGNOSIS — E11.29 TYPE 2 DIABETES MELLITUS WITH OTHER DIABETIC KIDNEY COMPLICATION, WITHOUT LONG-TERM CURRENT USE OF INSULIN (HCC): ICD-10-CM

## 2023-02-09 DIAGNOSIS — E78.5 TYPE 2 DIABETES MELLITUS WITH HYPERLIPIDEMIA (HCC): ICD-10-CM

## 2023-02-09 DIAGNOSIS — D84.821 IMMUNOSUPPRESSION DUE TO CHRONIC STEROID USE (HCC): ICD-10-CM

## 2023-02-09 DIAGNOSIS — Z79.52 IMMUNOSUPPRESSION DUE TO CHRONIC STEROID USE (HCC): ICD-10-CM

## 2023-02-09 DIAGNOSIS — J01.00 ACUTE NON-RECURRENT MAXILLARY SINUSITIS: ICD-10-CM

## 2023-02-09 DIAGNOSIS — E11.69 TYPE 2 DIABETES MELLITUS WITH HYPERLIPIDEMIA (HCC): ICD-10-CM

## 2023-02-09 DIAGNOSIS — G31.9 CEREBRAL ATROPHY (HCC): ICD-10-CM

## 2023-02-09 DIAGNOSIS — E11.9 DIABETES MELLITUS WITH COINCIDENT HYPERTENSION (HCC): ICD-10-CM

## 2023-02-09 DIAGNOSIS — I71.40 ABDOMINAL AORTIC ANEURYSM (AAA) WITHOUT RUPTURE, UNSPECIFIED PART (HCC): ICD-10-CM

## 2023-02-09 DIAGNOSIS — I13.0 HYPERTENSIVE HEART AND CHRONIC KIDNEY DISEASE WITH HEART FAILURE AND STAGE 1 THROUGH STAGE 4 CHRONIC KIDNEY DISEASE, OR UNSPECIFIED CHRONIC KIDNEY DISEASE (HCC): ICD-10-CM

## 2023-02-09 DIAGNOSIS — I10 DIABETES MELLITUS WITH COINCIDENT HYPERTENSION (HCC): ICD-10-CM

## 2023-02-09 DIAGNOSIS — E27.49 SECONDARY ADRENAL INSUFFICIENCY (HCC): ICD-10-CM

## 2023-02-09 DIAGNOSIS — T38.0X5A IMMUNOSUPPRESSION DUE TO CHRONIC STEROID USE (HCC): ICD-10-CM

## 2023-02-09 DIAGNOSIS — E11.21 TYPE 2 DIABETES MELLITUS WITH DIABETIC NEPHROPATHY, WITHOUT LONG-TERM CURRENT USE OF INSULIN (HCC): ICD-10-CM

## 2023-02-09 PROBLEM — S81.801D UNSPECIFIED OPEN WOUND, RIGHT LOWER LEG, SUBSEQUENT ENCOUNTER: Status: RESOLVED | Noted: 2022-06-22 | Resolved: 2023-02-09

## 2023-02-09 PROBLEM — N64.4 BREAST TENDERNESS IN MALE: Status: RESOLVED | Noted: 2022-12-06 | Resolved: 2023-02-09

## 2023-02-09 LAB
HBA1C MFR BLD: 6.8 % (ref ?–5.8)
POCT INT CON NEG: NEGATIVE
POCT INT CON POS: POSITIVE

## 2023-02-09 PROCEDURE — 83036 HEMOGLOBIN GLYCOSYLATED A1C: CPT | Performed by: FAMILY MEDICINE

## 2023-02-09 PROCEDURE — 99214 OFFICE O/P EST MOD 30 MIN: CPT | Performed by: FAMILY MEDICINE

## 2023-02-09 RX ORDER — AMOXICILLIN AND CLAVULANATE POTASSIUM 875; 125 MG/1; MG/1
1 TABLET, FILM COATED ORAL 2 TIMES DAILY
Qty: 20 TABLET | Refills: 0 | Status: SHIPPED | OUTPATIENT
Start: 2023-02-09 | End: 2023-03-09

## 2023-02-09 ASSESSMENT — FIBROSIS 4 INDEX: FIB4 SCORE: 2.54

## 2023-02-09 NOTE — ASSESSMENT & PLAN NOTE
This is an acute resolved problem.  Patient is here for follow-up on the CAT scan that was done on his abdomen.  It did not show any new abnormalities.  And in fact his diarrhea has now stopped.  Previous labs done on him in December were negative.  Continue to follow.

## 2023-02-09 NOTE — PROGRESS NOTES
Subjective:     CC: Here for several issues.    HPI:   Jama presents today with the following medical concerns:    Functional diarrhea  This is an acute resolved problem.  Patient is here for follow-up on the CAT scan that was done on his abdomen.  It did not show any new abnormalities.  And in fact his diarrhea has now stopped.  Previous labs done on him in December were negative.  Continue to follow.    Acute non-recurrent maxillary sinusitis  This is a new problem.  For the last several days patient has had nasal congestion with some yellow drainage that seems to be moving into his chest.  No fever or chills.  He is on immunosuppressant therapy his sodium is to be very cautious.    Type 2 diabetes mellitus with kidney complication, without long-term current use of insulin (HCC)  This is a chronic problem.  His diabetes is under very good control on current medications.  His kidneys are managed by his nephrologist.    Diabetes mellitus with coincident hypertension (HCC)  These are both chronic problems under good control.    Type 2 diabetes mellitus with hyperlipidemia (HCC)  These are both chronic problems under good control.  He does see cardiology on a regular basis.    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  He is monitored by his nephrologist.    Secondary adrenal insufficiency (HCC)  This is a chronic problem.  Managed by his nephrologist.    Cerebral atrophy (HCC)  This is a chronic problem.  Likely due to aging.    Hypertensive heart and chronic kidney disease with heart failure and stage 1 through stage 4 chronic kidney disease, or unspecified chronic kidney disease (HCC)  This is a chronic problem.  He is managed by cardiology and nephrology.  Condition is stable.    Past Medical History:   Diagnosis Date    Benign essential hypertension     Hyperlipoproteinemia     Hypertension     not on meds anymore    Pain     Polycystic kidney 9/10/10    RIGHT KIDNEY  TRANSPLANT    Sleep apnea     Snoring        Social History     Tobacco Use    Smoking status: Never    Smokeless tobacco: Never   Vaping Use    Vaping Use: Never used   Substance Use Topics    Alcohol use: No    Drug use: No       Current Outpatient Medications Ordered in Epic   Medication Sig Dispense Refill    amoxicillin-clavulanate (AUGMENTIN) 875-125 MG Tab Take 1 Tablet by mouth 2 times a day. 20 Tablet 0    SITagliptin (JANUVIA) 50 MG Tab Take 1 Tablet by mouth every day. 100 Tablet 0    atorvastatin (LIPITOR) 80 MG tablet Take 80 mg by mouth every evening.      metoprolol SR (TOPROL XL) 25 MG TABLET SR 24 HR Take 1 Tablet by mouth every day. 90 Tablet 3    glyBURIDE (DIABETA) 5 MG Tab Take 2 Tablets by mouth every morning with breakfast. 2 tablets = 10 mg. (Patient taking differently: Take 10 mg by mouth every morning with breakfast. 2 tablets = 10 mg.    Pt stated he takes 2 tablets in the morning and 2 tablets in the evening. Total of 20 MG a day) 90 Tablet 3    apixaban (ELIQUIS) 5mg Tab Take 1 Tablet by mouth 2 times a day. 180 Tablet 3    tadalafil (CIALIS) 5 MG tablet : TAKE 1 TABLETS 30 MINUTES BEFORE SEXUAL ACTIVITY, DO NOT EXCEED MORE THAN ONE DOSE A DAY 15 Tablet 3    gabapentin (NEURONTIN) 300 MG Cap Take 2 Capsules by mouth 2 (two) times a day. 360 Capsule 1    pioglitazone (ACTOS) 45 MG Tab Take 1 Tablet by mouth every day. 100 Tablet 3    mycophenolate (CELLCEPT) 250 MG Cap Pt stated he takes 2 tablets in the morning and 2 tablets in the evening. Total of 1000 MG a day      omeprazole (PRILOSEC) 20 MG delayed-release capsule Take 1 Capsule by mouth every day.      predniSONE (DELTASONE) 5 MG Tab Take 1 Tablet by mouth every day. 30 Tablet 0    tacrolimus (PROGRAF) 1 MG Cap Take 1 Capsule by mouth 2 times a day. (Patient taking differently: Take 1 mg by mouth 2 times a day. 0800  2000) 60 Capsule 3     No current [facility]-ordered facility-administered medications on file.  "      Allergies:  Doxycycline    Health Maintenance: Completed    ROS:  Gen: no fevers/chills, no changes in weight  Eyes: no changes in vision  ENT: no sore throat, no hearing loss, no bloody nose  Pulm: no sob,   CV: no chest pain, no palpitations  GI: no nausea/vomiting, no diarrhea  : no dysuria  MSk: no myalgias  Skin: no rash  Neuro: no headaches, no numbness/tingling  Heme/Lymph: no easy bruising      Objective:       Exam:  /68 (BP Location: Right arm, Patient Position: Sitting, BP Cuff Size: Adult)   Pulse 64   Temp 36.4 °C (97.5 °F) (Temporal)   Resp 18   Ht 1.956 m (6' 5\")   Wt 110 kg (242 lb)   SpO2 95%   BMI 28.70 kg/m²  Body mass index is 28.7 kg/m².    Gen: Alert and oriented, No apparent distress.  ENT:    Ear canals and TMs are clear.  Nose has mild congestion with yellowish drainage.  Throat is clear.  Neck: Neck is supple without lymphadenopathy.  Lungs: Normal effort, CTA bilaterally, no wheezes, rhonchi, or rales  CV: Regular rate and rhythm. No murmurs, rubs, or gallops.  Ext: No clubbing, cyanosis, edema.      Assessment & Plan:     67 y.o. male with the following -     1. Type 2 diabetes mellitus with diabetic nephropathy, without long-term current use of insulin (HCC)  This is a chronic problem.  Hemoglobin A1c today is 6.8%.  Continue to follow.  - POCT  A1C    2. Acute non-recurrent maxillary sinusitis  This is a new problem.  Patient was treated with Augmentin.    3. Hypertensive heart and chronic kidney disease with heart failure and stage 1 through stage 4 chronic kidney disease, or unspecified chronic kidney disease (HCC)  This is a chronic problem.  Continue to follow-up with a specialist.    4. Type 2 diabetes mellitus with hyperlipidemia (HCC)  This is a chronic stable condition.  Continue to follow.    5. Type 2 diabetes mellitus with other diabetic kidney complication, without long-term current use of insulin (HCC)  This is a chronic problem.  Continue to follow-up " with his nephrologist.  Hemoglobin A1c is very good.    6. Diabetes mellitus with coincident hypertension (HCC)  These are both chronic stable conditions.  Continue to follow.    7. Secondary adrenal insufficiency (HCC)  This is a chronic problem.  Continue to follow through his nephrologist.    8. 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  Continue follow-up through his nephrologist.    9. Cerebral atrophy (HCC)  This is a chronic problem.  Likely due to aging.  Continue to follow.    10. Functional diarrhea  This is a acute resolved problem.  Follow-up as needed.    HCC Gap Form    Diagnosis to address: E11.69, E78.5 - Type 2 diabetes mellitus with hyperlipidemia (HCC)  Assessment and plan: Chronic, stable. Continue with current defined treatment plan: He is on appropriate medications refer to his med list.. Follow-up at least annually.  Diagnosis: E11.40 - Type 2 diabetes, controlled, with neuropathy (HCC)  Assessment and plan: Chronic, stable. Continue with current defined treatment plan: He is on appropriate medications.  Refer to his med list.. Follow-up at least annually.  Diagnosis: E11.29 - Type 2 diabetes mellitus with other diabetic kidney complication, without long-term current use of insulin (HCC)  Assessment and plan: Chronic, stable. Continue with current defined treatment plan: He is on appropriate medications.  Refer to his med list.. Follow-up at least annually.  Diagnosis: E11.9, I10 - Diabetes mellitus with coincident hypertension (HCC)  Assessment and plan: Chronic, stable. Continue with current defined treatment plan: Both under very good control.  Refer to his med list.. Follow-up at least annually.  Diagnosis: E27.49 - Secondary adrenal insufficiency (HCC)  Assessment and plan: Chronic, stable, as based on today's assessment and impact on other conditions evaluated today. Continue with current treatment plan: This is followed by his nephrologist.   Follow-up with specialist as directed, but at least annually.  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 Continue with current treatment plan: This is cared for by his nephrologist.  Follow-up with specialist as directed, but at least annually.  Diagnosis: G31.9 - Cerebral atrophy (HCC)  Assessment and plan: Chronic, stable. Continue with current defined treatment plan: This is a chronic problem.  Likely due to aging.. Follow-up at least annually.  Diagnosis: I13.0 - Hypertensive heart and chronic kidney disease with heart failure and stage 1 through stage 4 chronic kidney disease, or unspecified chronic kidney disease (HCC)  Assessment and plan: Chronic, stable, as based on today's assessment and impact on other conditions evaluated today. Continue with current treatment plan: This is a chronic problem.  He sees cardiology and nephrology.  Follow-up with specialist as directed, but at least annually.  Last edited 02/09/23 10:22 PST by Ganesh Benton III, M.D.         Return in about 3 months (around 5/9/2023) for Long.    Please note that this dictation was created using voice recognition software. I have made every reasonable attempt to correct obvious errors, but I expect that there are errors of grammar and possibly content that I did not discover before finalizing the note.

## 2023-02-09 NOTE — ASSESSMENT & PLAN NOTE
This is a chronic problem.  His diabetes is under very good control on current medications.  His kidneys are managed by his nephrologist.

## 2023-02-09 NOTE — ASSESSMENT & PLAN NOTE
This is a new problem.  For the last several days patient has had nasal congestion with some yellow drainage that seems to be moving into his chest.  No fever or chills.  He is on immunosuppressant therapy his sodium is to be very cautious.

## 2023-02-28 ENCOUNTER — HOSPITAL ENCOUNTER (OUTPATIENT)
Dept: LAB | Facility: MEDICAL CENTER | Age: 68
End: 2023-02-28
Attending: NURSE PRACTITIONER
Payer: MEDICARE

## 2023-02-28 LAB
25(OH)D3 SERPL-MCNC: 38 NG/ML (ref 30–100)
ALBUMIN SERPL BCP-MCNC: 4.2 G/DL (ref 3.2–4.9)
ALBUMIN/GLOB SERPL: 1.9 G/DL
ALP SERPL-CCNC: 83 U/L (ref 30–99)
ALT SERPL-CCNC: 28 U/L (ref 2–50)
ANION GAP SERPL CALC-SCNC: 9 MMOL/L (ref 7–16)
APPEARANCE UR: CLEAR
AST SERPL-CCNC: 20 U/L (ref 12–45)
BASOPHILS # BLD AUTO: 0.8 % (ref 0–1.8)
BASOPHILS # BLD: 0.04 K/UL (ref 0–0.12)
BILIRUB SERPL-MCNC: 0.6 MG/DL (ref 0.1–1.5)
BILIRUB UR QL STRIP.AUTO: NEGATIVE
BUN SERPL-MCNC: 38 MG/DL (ref 8–22)
CALCIUM ALBUM COR SERPL-MCNC: 9.1 MG/DL (ref 8.5–10.5)
CALCIUM SERPL-MCNC: 9.3 MG/DL (ref 8.5–10.5)
CHLORIDE SERPL-SCNC: 108 MMOL/L (ref 96–112)
CHOLEST SERPL-MCNC: 133 MG/DL (ref 100–199)
CO2 SERPL-SCNC: 24 MMOL/L (ref 20–33)
COLOR UR: YELLOW
CREAT SERPL-MCNC: 1.84 MG/DL (ref 0.5–1.4)
CREAT UR-MCNC: 86.53 MG/DL
CREAT UR-MCNC: 87.98 MG/DL
EOSINOPHIL # BLD AUTO: 0.09 K/UL (ref 0–0.51)
EOSINOPHIL NFR BLD: 1.9 % (ref 0–6.9)
ERYTHROCYTE [DISTWIDTH] IN BLOOD BY AUTOMATED COUNT: 52.9 FL (ref 35.9–50)
EST. AVERAGE GLUCOSE BLD GHB EST-MCNC: 146 MG/DL
FASTING STATUS PATIENT QL REPORTED: NORMAL
GFR SERPLBLD CREATININE-BSD FMLA CKD-EPI: 40 ML/MIN/1.73 M 2
GLOBULIN SER CALC-MCNC: 2.2 G/DL (ref 1.9–3.5)
GLUCOSE SERPL-MCNC: 133 MG/DL (ref 65–99)
GLUCOSE UR STRIP.AUTO-MCNC: NEGATIVE MG/DL
HBA1C MFR BLD: 6.7 % (ref 4–5.6)
HCT VFR BLD AUTO: 42.3 % (ref 42–52)
HDLC SERPL-MCNC: 57 MG/DL
HGB BLD-MCNC: 13.2 G/DL (ref 14–18)
IMM GRANULOCYTES # BLD AUTO: 0.03 K/UL (ref 0–0.11)
IMM GRANULOCYTES NFR BLD AUTO: 0.6 % (ref 0–0.9)
IRON SATN MFR SERPL: 23 % (ref 15–55)
IRON SERPL-MCNC: 56 UG/DL (ref 50–180)
KETONES UR STRIP.AUTO-MCNC: NEGATIVE MG/DL
LDLC SERPL CALC-MCNC: 59 MG/DL
LEUKOCYTE ESTERASE UR QL STRIP.AUTO: NEGATIVE
LYMPHOCYTES # BLD AUTO: 1.01 K/UL (ref 1–4.8)
LYMPHOCYTES NFR BLD: 21.2 % (ref 22–41)
MAGNESIUM SERPL-MCNC: 1.8 MG/DL (ref 1.5–2.5)
MCH RBC QN AUTO: 28.3 PG (ref 27–33)
MCHC RBC AUTO-ENTMCNC: 31.2 G/DL (ref 33.7–35.3)
MCV RBC AUTO: 90.6 FL (ref 81.4–97.8)
MICRO URNS: NORMAL
MICROALBUMIN UR-MCNC: 2 MG/DL
MICROALBUMIN/CREAT UR: 23 MG/G (ref 0–30)
MONOCYTES # BLD AUTO: 0.65 K/UL (ref 0–0.85)
MONOCYTES NFR BLD AUTO: 13.7 % (ref 0–13.4)
NEUTROPHILS # BLD AUTO: 2.94 K/UL (ref 1.82–7.42)
NEUTROPHILS NFR BLD: 61.8 % (ref 44–72)
NITRITE UR QL STRIP.AUTO: NEGATIVE
NRBC # BLD AUTO: 0 K/UL
NRBC BLD-RTO: 0 /100 WBC
PH UR STRIP.AUTO: 6 [PH] (ref 5–8)
PHOSPHATE SERPL-MCNC: 2.8 MG/DL (ref 2.5–4.5)
PLATELET # BLD AUTO: 104 K/UL (ref 164–446)
PMV BLD AUTO: 12.3 FL (ref 9–12.9)
POTASSIUM SERPL-SCNC: 4.8 MMOL/L (ref 3.6–5.5)
PROT SERPL-MCNC: 6.4 G/DL (ref 6–8.2)
PROT UR QL STRIP: NEGATIVE MG/DL
PROT UR-MCNC: 10 MG/DL (ref 0–15)
PROT/CREAT UR: 116 MG/G (ref 15–68)
RBC # BLD AUTO: 4.67 M/UL (ref 4.7–6.1)
RBC UR QL AUTO: NEGATIVE
SODIUM SERPL-SCNC: 141 MMOL/L (ref 135–145)
SP GR UR STRIP.AUTO: 1.02
TIBC SERPL-MCNC: 242 UG/DL (ref 250–450)
TRIGL SERPL-MCNC: 85 MG/DL (ref 0–149)
UIBC SERPL-MCNC: 186 UG/DL (ref 110–370)
URATE SERPL-MCNC: 6.2 MG/DL (ref 2.5–8.3)
UROBILINOGEN UR STRIP.AUTO-MCNC: 0.2 MG/DL
WBC # BLD AUTO: 4.8 K/UL (ref 4.8–10.8)

## 2023-02-28 PROCEDURE — 82043 UR ALBUMIN QUANTITATIVE: CPT

## 2023-02-28 PROCEDURE — 85025 COMPLETE CBC W/AUTO DIFF WBC: CPT

## 2023-02-28 PROCEDURE — 82570 ASSAY OF URINE CREATININE: CPT | Mod: 91

## 2023-02-28 PROCEDURE — 83540 ASSAY OF IRON: CPT

## 2023-02-28 PROCEDURE — 84156 ASSAY OF PROTEIN URINE: CPT

## 2023-02-28 PROCEDURE — 83550 IRON BINDING TEST: CPT

## 2023-02-28 PROCEDURE — 80053 COMPREHEN METABOLIC PANEL: CPT

## 2023-02-28 PROCEDURE — 36415 COLL VENOUS BLD VENIPUNCTURE: CPT

## 2023-02-28 PROCEDURE — 84550 ASSAY OF BLOOD/URIC ACID: CPT

## 2023-02-28 PROCEDURE — 82306 VITAMIN D 25 HYDROXY: CPT

## 2023-02-28 PROCEDURE — 81003 URINALYSIS AUTO W/O SCOPE: CPT

## 2023-02-28 PROCEDURE — 80061 LIPID PANEL: CPT

## 2023-02-28 PROCEDURE — 83735 ASSAY OF MAGNESIUM: CPT

## 2023-02-28 PROCEDURE — 84100 ASSAY OF PHOSPHORUS: CPT

## 2023-02-28 PROCEDURE — 80197 ASSAY OF TACROLIMUS: CPT

## 2023-02-28 PROCEDURE — 83036 HEMOGLOBIN GLYCOSYLATED A1C: CPT

## 2023-03-02 LAB — TACROLIMUS BLD-MCNC: 3.8 NG/ML

## 2023-03-02 RX ORDER — GLYBURIDE 5 MG/1
10 TABLET ORAL 2 TIMES DAILY WITH MEALS
Qty: 400 TABLET | Refills: 3 | Status: SHIPPED | OUTPATIENT
Start: 2023-03-02

## 2023-03-03 ENCOUNTER — TELEPHONE (OUTPATIENT)
Dept: MEDICAL GROUP | Facility: PHYSICIAN GROUP | Age: 68
End: 2023-03-03
Payer: MEDICARE

## 2023-03-04 NOTE — TELEPHONE ENCOUNTER
Patient called and stated that the pharmacy does not have the 250 MG of his Mycophenolate but they do have 500 mg so he was wondering if we could send a rx for the 500mg as he takes two 250 mg already.

## 2023-03-09 ENCOUNTER — OFFICE VISIT (OUTPATIENT)
Dept: MEDICAL GROUP | Facility: PHYSICIAN GROUP | Age: 68
End: 2023-03-09
Payer: MEDICARE

## 2023-03-09 VITALS
HEIGHT: 77 IN | DIASTOLIC BLOOD PRESSURE: 64 MMHG | WEIGHT: 240.2 LBS | OXYGEN SATURATION: 95 % | TEMPERATURE: 98 F | SYSTOLIC BLOOD PRESSURE: 104 MMHG | BODY MASS INDEX: 28.36 KG/M2 | RESPIRATION RATE: 18 BRPM | HEART RATE: 77 BPM

## 2023-03-09 DIAGNOSIS — J01.00 ACUTE NON-RECURRENT MAXILLARY SINUSITIS: ICD-10-CM

## 2023-03-09 PROCEDURE — 99213 OFFICE O/P EST LOW 20 MIN: CPT | Performed by: FAMILY MEDICINE

## 2023-03-09 RX ORDER — AZITHROMYCIN 250 MG/1
TABLET, FILM COATED ORAL
Qty: 6 TABLET | Refills: 0 | Status: SHIPPED | OUTPATIENT
Start: 2023-03-09 | End: 2023-03-13

## 2023-03-09 RX ORDER — MYCOPHENOLATE MOFETIL 500 MG/1
500 TABLET ORAL 2 TIMES DAILY
Status: ON HOLD | COMMUNITY
Start: 2023-03-07 | End: 2024-03-16

## 2023-03-09 ASSESSMENT — FIBROSIS 4 INDEX: FIB4 SCORE: 2.4

## 2023-03-09 NOTE — PROGRESS NOTES
Subjective:     CC:   Chief Complaint   Patient presents with    Follow-Up       HPI:   Jama presents today due to fact he woke up with his left eye being red patient denies any visual problems at all.  Patient also is having issues when he blows his nose he has green material coming out.  Patient denies any fever with this.  Patient was treated for sinusitis in February with Augmentin patient states he was doing better afterwards but just woke up with this today.  Patient denies any fever or diarrhea with this.    Past Medical History:   Diagnosis Date    Benign essential hypertension     Hyperlipoproteinemia     Hypertension     not on meds anymore    Pain     Polycystic kidney 9/10/10    RIGHT KIDNEY TRANSPLANT    Sleep apnea     Snoring        Social History     Tobacco Use    Smoking status: Never    Smokeless tobacco: Never   Vaping Use    Vaping Use: Never used   Substance Use Topics    Alcohol use: No    Drug use: No       Current Outpatient Medications Ordered in Epic   Medication Sig Dispense Refill    azithromycin (ZITHROMAX) 250 MG Tab 2 tablets p.o. today then 1 each day for the next 4 more days 6 Tablet 0    mycophenolate (CELLCEPT) 500 MG tablet       glyBURIDE (DIABETA) 5 MG Tab Take 2 Tablets by mouth 2 times a day with meals. 400 Tablet 3    amoxicillin-clavulanate (AUGMENTIN) 875-125 MG Tab Take 1 Tablet by mouth 2 times a day. (Patient not taking: Reported on 3/9/2023) 20 Tablet 0    SITagliptin (JANUVIA) 50 MG Tab Take 1 Tablet by mouth every day. 100 Tablet 0    atorvastatin (LIPITOR) 80 MG tablet Take 80 mg by mouth every evening.      metoprolol SR (TOPROL XL) 25 MG TABLET SR 24 HR Take 1 Tablet by mouth every day. 90 Tablet 3    apixaban (ELIQUIS) 5mg Tab Take 1 Tablet by mouth 2 times a day. 180 Tablet 3    tadalafil (CIALIS) 5 MG tablet : TAKE 1 TABLETS 30 MINUTES BEFORE SEXUAL ACTIVITY, DO NOT EXCEED MORE THAN ONE DOSE A DAY 15 Tablet 3    gabapentin (NEURONTIN) 300 MG Cap Take 2  "Capsules by mouth 2 (two) times a day. 360 Capsule 1    pioglitazone (ACTOS) 45 MG Tab Take 1 Tablet by mouth every day. 100 Tablet 3    mycophenolate (CELLCEPT) 250 MG Cap Pt stated he takes 2 tablets in the morning and 2 tablets in the evening. Total of 1000 MG a day      omeprazole (PRILOSEC) 20 MG delayed-release capsule Take 1 Capsule by mouth every day.      predniSONE (DELTASONE) 5 MG Tab Take 1 Tablet by mouth every day. 30 Tablet 0    tacrolimus (PROGRAF) 1 MG Cap Take 1 Capsule by mouth 2 times a day. (Patient taking differently: Take 1 mg by mouth 2 times a day. 0800  2000) 60 Capsule 3     No current [facility]-ordered facility-administered medications on file.       Allergies:  Doxycycline    Health Maintenance: Completed    ROS:  Gen: no fevers/chills, no changes in weight  Eyes: no changes in vision  ENT: no sore throat, no hearing loss, no bloody nose  Pulm: no sob, no cough  CV: no chest pain, no palpitations  GI: no nausea/vomiting, no diarrhea  : no dysuria  Neuro: no headaches, no numbness/tingling  Heme/Lymph: no easy bruising    Objective:     Exam:  /64 (BP Location: Right arm, Patient Position: Sitting, BP Cuff Size: Large adult)   Pulse 77   Temp 36.7 °C (98 °F) (Temporal)   Resp 18   Ht 1.956 m (6' 5\")   Wt 109 kg (240 lb 3.2 oz)   SpO2 95%   BMI 28.48 kg/m²  Body mass index is 28.48 kg/m².    Gen: Alert and oriented, No apparent distress.  Skin: Warm and dry.  No obvious lesions.  Eyes: Sclera wnl Pupils normal in size, patient sclera on the left eye is injected there is no discolored drainage just some clear tearing noted he has some slight swelling to his upper lid and below that eye.  ENT: Canals wnl and TM are not red  Lungs: Normal effort, CTA bilaterally, no wheezes, rhonchi, or rales  CV: Regular rate and rhythm. No murmurs, rubs, or gallops.  Musculoskeletal: Normal gait. No extremity cyanosis, clubbing, or edema.  Neuro: Oriented to person, place and time  Psych: Mood is " wnl       Assessment & Plan:     67 y.o. male with the following -     1. Acute non-recurrent maxillary sinusitis  Recommend artificial tears for his eyes also start him on antibiotics patient to be seen his PCP tomorrow for follow-up.  Recommend if things worsen or his vision gets infected he should be followed up in the emergency room    - azithromycin (ZITHROMAX) 250 MG Tab; 2 tablets p.o. today then 1 each day for the next 4 more days  Dispense: 6 Tablet; Refill: 0       Return in about 1 day (around 3/10/2023), or if symptoms worsen or fail to improve.    Please note that this dictation was created using voice recognition software. I have made every reasonable attempt to correct obvious errors, but I expect that there are errors of grammar and possibly content that I did not discover before finalizing the note.

## 2023-03-10 ENCOUNTER — OFFICE VISIT (OUTPATIENT)
Dept: MEDICAL GROUP | Facility: PHYSICIAN GROUP | Age: 68
End: 2023-03-10
Payer: MEDICARE

## 2023-03-10 VITALS
TEMPERATURE: 97.8 F | RESPIRATION RATE: 18 BRPM | DIASTOLIC BLOOD PRESSURE: 76 MMHG | HEIGHT: 77 IN | WEIGHT: 241 LBS | SYSTOLIC BLOOD PRESSURE: 114 MMHG | BODY MASS INDEX: 28.46 KG/M2 | HEART RATE: 78 BPM | OXYGEN SATURATION: 96 %

## 2023-03-10 DIAGNOSIS — D68.69 HYPERCOAGULABILITY DUE TO ATRIAL FIBRILLATION (HCC): ICD-10-CM

## 2023-03-10 DIAGNOSIS — I48.91 HYPERCOAGULABILITY DUE TO ATRIAL FIBRILLATION (HCC): ICD-10-CM

## 2023-03-10 DIAGNOSIS — H10.13 ALLERGIC CONJUNCTIVITIS OF BOTH EYES: ICD-10-CM

## 2023-03-10 DIAGNOSIS — D69.6 THROMBOCYTOPENIA (HCC): ICD-10-CM

## 2023-03-10 PROCEDURE — 99213 OFFICE O/P EST LOW 20 MIN: CPT | Performed by: FAMILY MEDICINE

## 2023-03-10 RX ORDER — OLOPATADINE HYDROCHLORIDE 1 MG/ML
1 SOLUTION/ DROPS OPHTHALMIC 2 TIMES DAILY
Qty: 5 ML | Refills: 1 | Status: SHIPPED | OUTPATIENT
Start: 2023-03-10 | End: 2023-10-16

## 2023-03-10 ASSESSMENT — FIBROSIS 4 INDEX: FIB4 SCORE: 2.4

## 2023-03-10 NOTE — ASSESSMENT & PLAN NOTE
Patient was seen yesterday for what was a sinus infection but he also was having swelling with redness to his left eye without symptoms.  Also had some green drainage in his eye.  He was started on Zithromax and is here for follow-up.  He states now his right eye is turning red and is beginning to swell.  He is having no trouble with his vision.  No pain in his eyes.  He did just return from a trip to [place] where he was in a hotel so not sure what he might of been exposed to.  He is feeling better today with the antibiotic.

## 2023-03-10 NOTE — PROGRESS NOTES
Subjective:     CC: Here for follow-up on his left red eye.    HPI:   Jama presents today with the following medical concerns:    Allergic conjunctivitis of both eyes  Patient was seen yesterday for what was a sinus infection but he also was having swelling with redness to his left eye without symptoms.  Also had some green drainage in his eye.  He was started on Zithromax and is here for follow-up.  He states now his right eye is turning red and is beginning to swell.  He is having no trouble with his vision.  No pain in his eyes.  He did just return from a trip to [place] where he was in a hotel so not sure what he might of been exposed to.  He is feeling better today with the antibiotic.    Hypercoagulability due to atrial fibrillation (HCC)  This is a chronic problem.  Patient is on Eliquis through his cardiologist.    Thrombocytopenia (HCC)  This is a chronic stable condition.  Continue to follow.    Past Medical History:   Diagnosis Date    Benign essential hypertension     Hyperlipoproteinemia     Hypertension     not on meds anymore    Pain     Polycystic kidney 9/10/10    RIGHT KIDNEY TRANSPLANT    Sleep apnea     Snoring        Social History     Tobacco Use    Smoking status: Never    Smokeless tobacco: Never   Vaping Use    Vaping Use: Never used   Substance Use Topics    Alcohol use: No    Drug use: No       Current Outpatient Medications Ordered in Epic   Medication Sig Dispense Refill    olopatadine (PATANOL) 0.1 % ophthalmic solution Administer 1 Drop into both eyes 2 times a day. 5 mL 1    mycophenolate (CELLCEPT) 500 MG tablet       azithromycin (ZITHROMAX) 250 MG Tab 2 tablets p.o. today then 1 each day for the next 4 more days 6 Tablet 0    glyBURIDE (DIABETA) 5 MG Tab Take 2 Tablets by mouth 2 times a day with meals. 400 Tablet 3    SITagliptin (JANUVIA) 50 MG Tab Take 1 Tablet by mouth every day. 100 Tablet 0    atorvastatin (LIPITOR) 80 MG tablet Take 80 mg by mouth every evening.      metoprolol  "SR (TOPROL XL) 25 MG TABLET SR 24 HR Take 1 Tablet by mouth every day. 90 Tablet 3    apixaban (ELIQUIS) 5mg Tab Take 1 Tablet by mouth 2 times a day. 180 Tablet 3    tadalafil (CIALIS) 5 MG tablet : TAKE 1 TABLETS 30 MINUTES BEFORE SEXUAL ACTIVITY, DO NOT EXCEED MORE THAN ONE DOSE A DAY 15 Tablet 3    gabapentin (NEURONTIN) 300 MG Cap Take 2 Capsules by mouth 2 (two) times a day. 360 Capsule 1    pioglitazone (ACTOS) 45 MG Tab Take 1 Tablet by mouth every day. 100 Tablet 3    omeprazole (PRILOSEC) 20 MG delayed-release capsule Take 1 Capsule by mouth every day.      predniSONE (DELTASONE) 5 MG Tab Take 1 Tablet by mouth every day. 30 Tablet 0    tacrolimus (PROGRAF) 1 MG Cap Take 1 Capsule by mouth 2 times a day. (Patient taking differently: Take 1 mg by mouth 2 times a day. 0800  2000) 60 Capsule 3     No current [facility]-ordered facility-administered medications on file.       Allergies:  Doxycycline    Health Maintenance: Completed    ROS:  Gen: no fevers/chills, no changes in weight  Eyes: no changes in vision  ENT: no sore throat, no hearing loss, no bloody nose  Pulm: no sob, no cough  CV: no chest pain, no palpitations  GI: no nausea/vomiting, no diarrhea  : no dysuria  MSk: no myalgias  Skin: no rash  Neuro: no headaches, no numbness/tingling  Heme/Lymph: no easy bruising      Objective:       Exam:  /76 (BP Location: Right arm, Patient Position: Sitting, BP Cuff Size: Adult)   Pulse 78   Temp 36.6 °C (97.8 °F) (Temporal)   Resp 18   Ht 1.956 m (6' 5\")   Wt 109 kg (241 lb)   SpO2 96%   BMI 28.58 kg/m²  Body mass index is 28.58 kg/m².    Gen: Alert and oriented, No apparent distress.  Eyes:   Patient does have mild redness to both sclera.  There is also some swelling underneath the eyelids on both sides.  No drainage is seen.  Pupils are equally reactive to light and accommodation.  Extraocular motion appears intact.  Neck: Neck is supple without lymphadenopathy.  Ext: No clubbing, cyanosis, " edema.      Labs: Reviewed with patient    Assessment & Plan:     67 y.o. male with the following -     1. Hypercoagulability due to atrial fibrillation (HCC)  This is a chronic problem.  Continue to follow.  Continue Eliquis.    2. Thrombocytopenia (HCC)  This is a chronic problem.  Continue to follow.  Appears stable.    3. Allergic conjunctivitis of both eyes  This is an acute problem.  I suspect he came into contact with something in the [place] that is triggering an allergic reaction in his eyes.  We will put on mannitol eyedrops.  He is to get back with me next week on how he is doing.  He was told if he develops any changes in his vision or eye pain he needs to see an eye doctor immediately.    [facility] Gap Form    Diagnosis to address: D68.69, I48.91 - Hypercoagulability due to atrial fibrillation (HCC)  Assessment and plan: Chronic, stable, as based on today's assessment and impact on other conditions evaluated today. Continue with current treatment plan: Patient is on Eliquis 5 mg twice a day.  Follow-up with specialist as directed, but at least annually.  Diagnosis: D69.6 - Thrombocytopenia (HCC)  Assessment and plan: Chronic, stable. Continue with current defined treatment plan: Stable condition.. Follow-up at least annually.  Last edited 03/10/23 13:23 PST by Ganesh Benton III, M.D.         Return if symptoms worsen or fail to improve.    Please note that this dictation was created using voice recognition software. I have made every reasonable attempt to correct obvious errors, but I expect that there are errors of grammar and possibly content that I did not discover before finalizing the note.

## 2023-03-14 ENCOUNTER — OFFICE VISIT (OUTPATIENT)
Dept: MEDICAL GROUP | Facility: PHYSICIAN GROUP | Age: 68
End: 2023-03-14
Payer: MEDICARE

## 2023-03-14 VITALS
TEMPERATURE: 97.8 F | HEART RATE: 56 BPM | RESPIRATION RATE: 16 BRPM | DIASTOLIC BLOOD PRESSURE: 62 MMHG | BODY MASS INDEX: 28.49 KG/M2 | SYSTOLIC BLOOD PRESSURE: 122 MMHG | OXYGEN SATURATION: 97 % | WEIGHT: 241.25 LBS | HEIGHT: 77 IN

## 2023-03-14 DIAGNOSIS — H10.9 CONJUNCTIVITIS OF LEFT EYE, UNSPECIFIED CONJUNCTIVITIS TYPE: ICD-10-CM

## 2023-03-14 PROCEDURE — 99213 OFFICE O/P EST LOW 20 MIN: CPT | Performed by: PHYSICIAN ASSISTANT

## 2023-03-14 RX ORDER — GABAPENTIN 300 MG/1
CAPSULE ORAL
Qty: 360 CAPSULE | Refills: 1 | Status: SHIPPED | OUTPATIENT
Start: 2023-03-14 | End: 2023-09-11

## 2023-03-14 RX ORDER — ERYTHROMYCIN 5 MG/G
OINTMENT OPHTHALMIC
Qty: 3.5 G | Refills: 0 | Status: SHIPPED | OUTPATIENT
Start: 2023-03-14 | End: 2023-07-10

## 2023-03-14 ASSESSMENT — ENCOUNTER SYMPTOMS
EYE PAIN: 0
EYE REDNESS: 1
PHOTOPHOBIA: 0
DOUBLE VISION: 0
BLURRED VISION: 0
FEVER: 0
EYE DISCHARGE: 1
SHORTNESS OF BREATH: 0
CHILLS: 0

## 2023-03-14 ASSESSMENT — FIBROSIS 4 INDEX: FIB4 SCORE: 2.4

## 2023-03-14 NOTE — PROGRESS NOTES
"Subjective:     CC: Persistent left eye irritation    HPI:   Jama, patient of Dr. Ganesh Benton, presents today for an acute visit with the following:    Persistent left eye redness and irritation  This started a week or so ago.  He was given antibiotics for sinus infection.  He has green drainage in the morning but clear throughout the day.  The left eye is significantly better than it was.  He denies foreign body sensation, ocular pain, vision changes.  He does report some sticky drainage in the morning.  He reports itching and irritation, denying overt pain.  He was given some eyedrops from his primary care provider that helped some but the symptoms remain.    Health Maintenance: Not addressed    ROS:  Review of Systems   Constitutional:  Negative for chills and fever.   Eyes:  Positive for discharge and redness. Negative for blurred vision, double vision, photophobia and pain.   Respiratory:  Negative for shortness of breath.    Cardiovascular:  Negative for chest pain.     Objective:     Exam:  /62 (BP Location: Left arm, Patient Position: Sitting, BP Cuff Size: Large adult)   Pulse (!) 56   Temp 36.6 °C (97.8 °F) (Temporal)   Resp 16   Ht 1.956 m (6' 5\")   Wt 109 kg (241 lb 4 oz)   SpO2 97%   BMI 28.61 kg/m²  Body mass index is 28.61 kg/m².    Physical Exam  Constitutional:       Appearance: Normal appearance.   HENT:      Head: Normocephalic.   Eyes:      Extraocular Movements: Extraocular movements intact.      Pupils: Pupils are equal, round, and reactive to light.      Comments: Diffuse conjunctival injection noted to the left eye.  Surrounding erythema is noted on the upper and lower eyelids with trace edema.  No localized tenderness, nodules noted.  No chemosis noted.  No fluorescein uptake noted in the left eye.  No foreign body noted on eyelid eversion.  No drainage is noted on exam.   Pulmonary:      Effort: Pulmonary effort is normal.   Musculoskeletal:         General: No swelling. "   Skin:     General: Skin is warm and dry.   Neurological:      General: No focal deficit present.      Mental Status: He is alert.   Psychiatric:         Mood and Affect: Mood normal.         Behavior: Behavior normal.       Assessment & Plan:     67 y.o. male with the following -     1. Conjunctivitis of left eye, unspecified conjunctivitis type  Acute, uncontrolled.  Question if this is more reaction than infectious.  Erythromycin ointment to cover for any potential remaining infection.  Recommend taking over-the-counter chlorpheniramine tablets for any potential allergy response.  Recommend optometry evaluation for any symptoms that remain.    - erythromycin 5 MG/GM Ointment; 1 dose left eye three times daily for 7 days.  Dispense: 3.5 g; Refill: 0      Return if symptoms worsen or fail to improve.    Please note that this dictation was created using voice recognition software. I have made every reasonable attempt to correct obvious errors, but I expect that there are errors of grammar and possibly content that I did not discover before finalizing the note.

## 2023-03-21 ENCOUNTER — OFFICE VISIT (OUTPATIENT)
Dept: MEDICAL GROUP | Facility: PHYSICIAN GROUP | Age: 68
End: 2023-03-21
Payer: MEDICARE

## 2023-03-21 VITALS
HEART RATE: 59 BPM | SYSTOLIC BLOOD PRESSURE: 124 MMHG | HEIGHT: 77 IN | BODY MASS INDEX: 28.36 KG/M2 | OXYGEN SATURATION: 96 % | TEMPERATURE: 97.9 F | RESPIRATION RATE: 18 BRPM | DIASTOLIC BLOOD PRESSURE: 64 MMHG | WEIGHT: 240.2 LBS

## 2023-03-21 DIAGNOSIS — J32.9 RECURRENT SINUSITIS: ICD-10-CM

## 2023-03-21 PROCEDURE — 99214 OFFICE O/P EST MOD 30 MIN: CPT | Performed by: PHYSICIAN ASSISTANT

## 2023-03-21 RX ORDER — AMOXICILLIN AND CLAVULANATE POTASSIUM 875; 125 MG/1; MG/1
1 TABLET, FILM COATED ORAL 2 TIMES DAILY
Qty: 20 TABLET | Refills: 0 | Status: SHIPPED | OUTPATIENT
Start: 2023-03-21 | End: 2023-03-31

## 2023-03-21 ASSESSMENT — FIBROSIS 4 INDEX: FIB4 SCORE: 2.4

## 2023-03-21 ASSESSMENT — ENCOUNTER SYMPTOMS
COUGH: 1
FEVER: 0
SHORTNESS OF BREATH: 0
CHILLS: 0

## 2023-03-21 NOTE — PROGRESS NOTES
"Subjective:     CC: Cough, sinus congestion    HPI:   Jama, patient of Dr. Ganesh Benton, presents for an acute visit with the following:    Evaluated 2/9  Diagnosed with sinusitis  Given Augmentin  Symptoms completely resolved  Symptoms restarted a few weeks later    Evaluated 3/9  Returned sinus symptoms  Put on azithromycin  Symptoms improved but did not resolve  Symptoms are now worsening  Persistent green drainage from his sinuses  Coughing up green sputum  Worsening fatigue    Health Maintenance: Not addressed    ROS:  Review of Systems   Constitutional:  Positive for malaise/fatigue. Negative for chills and fever.   HENT:  Positive for congestion.    Respiratory:  Positive for cough. Negative for shortness of breath.    Cardiovascular:  Negative for chest pain.     Objective:     Exam:  /64 (BP Location: Left arm, Patient Position: Sitting, BP Cuff Size: Adult)   Pulse (!) 59   Temp 36.6 °C (97.9 °F) (Temporal)   Resp 18   Ht 1.956 m (6' 5\")   Wt 109 kg (240 lb 3.2 oz)   SpO2 96%   BMI 28.48 kg/m²  Body mass index is 28.48 kg/m².    Physical Exam  Constitutional:       Appearance: Normal appearance.   HENT:      Head: Normocephalic.      Right Ear: Tympanic membrane, ear canal and external ear normal.      Left Ear: Tympanic membrane, ear canal and external ear normal.      Ears:      Comments: Serous effusions and bulging bilaterally  Eyes:      Conjunctiva/sclera: Conjunctivae normal.   Cardiovascular:      Rate and Rhythm: Normal rate and regular rhythm.      Pulses: Normal pulses.      Heart sounds: No murmur heard.    No gallop.   Pulmonary:      Effort: No respiratory distress.      Breath sounds: No wheezing.   Musculoskeletal:         General: No swelling.   Skin:     General: Skin is warm and dry.   Neurological:      General: No focal deficit present.      Mental Status: He is alert.   Psychiatric:         Mood and Affect: Mood normal.         Behavior: Behavior normal. "         Assessment & Plan:     67 y.o. male with the following -     1. Recurrent sinusitis  Chronic, uncontrolled.  Repeating Augmentin.  Patient is immunocompromised on CellCept and Prograf.  This is patient's third antibiotic in the last month.    Advise follow-up with primary care if symptoms persist or worsen.    - amoxicillin-clavulanate (AUGMENTIN) 875-125 MG Tab; Take 1 Tablet by mouth 2 times a day for 10 days.  Dispense: 20 Tablet; Refill: 0              Return if symptoms worsen or fail to improve.    Please note that this dictation was created using voice recognition software. I have made every reasonable attempt to correct obvious errors, but I expect that there are errors of grammar and possibly content that I did not discover before finalizing the note.

## 2023-03-31 RX ORDER — OMEPRAZOLE 20 MG/1
20 CAPSULE, DELAYED RELEASE ORAL DAILY
Qty: 90 CAPSULE | Refills: 3 | Status: SHIPPED | OUTPATIENT
Start: 2023-03-31 | End: 2024-03-19

## 2023-04-18 NOTE — PROGRESS NOTES
Chief Complaint   Patient presents with    Coronary Artery Disease    Hyperlipidemia       Subjective     Jama Altman is a 67 y.o. male who presents today     Past Medical History:   Diagnosis Date    Benign essential hypertension     Hyperlipoproteinemia     Hypertension     not on meds anymore    Pain     Polycystic kidney 9/10/10    RIGHT KIDNEY TRANSPLANT    Sleep apnea     Snoring      Past Surgical History:   Procedure Laterality Date    KNEE MANIPULATION  2/16/2012    Performed by LATOYA CONNER at SURGERY [facility]S    KNEE UNICOMPARTMENTAL  12/23/2011    Performed by LATYOA CONNER at SURGERY [facility] ORS    KNEE ARTHROSCOPY  12/23/2011    Performed by LATOYA CONNER at SURGERY [facility] ORS    KNEE ARTHROSCOPY  5/3/2011    Performed by HANANE GOLDMAN at SURGERY SAME DAY [facility] ORS    MENISCECTOMY, KNEE, MEDIAL  5/3/2011    Performed by HANANE GOLDMAN at SURGERY SAME DAY [facility] ORS    OTHER  9/10/10    RIGHT KIDNEY TRANSPLANT    KNEE ARTHROPLASTY TOTAL  1/12/07    RIGHT    KNEE ARTHROSCOPY  4/10/06    RIGHT    OTHER ORTHOPEDIC SURGERY  7/8/74    LEFT KNEE DEBRIDEMENT     History reviewed. No pertinent family history.  Social History     Socioeconomic History    Marital status:      Spouse name: Not on file    Number of children: Not on file    Years of education: Not on file    Highest education level: Not on file   Occupational History    Not on file   Tobacco Use    Smoking status: Never    Smokeless tobacco: Never   Vaping Use    Vaping Use: Never used   Substance and Sexual Activity    Alcohol use: No    Drug use: No    Sexual activity: Yes     Partners: Female   Other Topics Concern    Not on file   Social History Narrative    Not on file     Social Determinants of Health     Financial Resource Strain: Not on file   Food Insecurity: Not on file   Transportation Needs: Not on file   Physical Activity: Not on file   Stress: Not on file   Social Connections: Not on file    Intimate Partner Violence: Not on file   Housing Stability: Not on file     Allergies   Allergen Reactions    Doxycycline Rash     Sweats and shakes: 9/28/17: Clarified allergy with patient. Allergy was in 1998 and he doesn't remember what happened. He thought the medication is for pain.  Tolerates doxycycline 9/2017     Outpatient Encounter Medications as of 1/17/2023   Medication Sig Dispense Refill    atorvastatin (LIPITOR) 80 MG tablet Take 80 mg by mouth every evening.      metoprolol SR (TOPROL XL) 25 MG TABLET SR 24 HR Take 1 Tablet by mouth every day. 90 Tablet 3    [DISCONTINUED] glyBURIDE (DIABETA) 5 MG Tab Take 2 Tablets by mouth every morning with breakfast. 2 tablets = 10 mg. (Patient taking differently: Take 10 mg by mouth every morning with breakfast. 2 tablets = 10 mg.    Pt stated he takes 2 tablets in the morning and 2 tablets in the evening. Total of 20 MG a day) 90 Tablet 3    apixaban (ELIQUIS) 5mg Tab Take 1 Tablet by mouth 2 times a day. 180 Tablet 3    tadalafil (CIALIS) 5 MG tablet : TAKE 1 TABLETS 30 MINUTES BEFORE SEXUAL ACTIVITY, DO NOT EXCEED MORE THAN ONE DOSE A DAY 15 Tablet 3    [DISCONTINUED] gabapentin (NEURONTIN) 300 MG Cap Take 2 Capsules by mouth 2 (two) times a day. 360 Capsule 1    [DISCONTINUED] SITagliptin (JANUVIA) 50 MG Tab Take 1 Tablet by mouth every day. 100 Tablet 2    pioglitazone (ACTOS) 45 MG Tab Take 1 Tablet by mouth every day. 100 Tablet 3    [DISCONTINUED] mycophenolate (CELLCEPT) 250 MG Cap Pt stated he takes 2 tablets in the morning and 2 tablets in the evening. Total of 1000 MG a day      [DISCONTINUED] omeprazole (PRILOSEC) 20 MG delayed-release capsule Take 1 Capsule by mouth every day.      predniSONE (DELTASONE) 5 MG Tab Take 1 Tablet by mouth every day. 30 Tablet 0    tacrolimus (PROGRAF) 1 MG Cap Take 1 Capsule by mouth 2 times a day. (Patient taking differently: Take 1 mg by mouth 2 times a day. 0800  2000) 60 Capsule 3     No facility-administered  "encounter medications on file as of 1/17/2023.     ROS           Objective     /78 (BP Location: Right arm, Patient Position: Sitting, BP Cuff Size: Adult)   Pulse 74   Resp 16   Ht 1.956 m (6' 5\")   Wt 110 kg (242 lb)   SpO2 95%   BMI 28.70 kg/m²     Physical Exam           Assessment & Plan     1. Coronary artery disease due to lipid rich plaque  Lipid Profile      2. History of MI (myocardial infarction)        3. Hyperlipidemia, unspecified hyperlipidemia type  Lipid Profile      4. PAF (paroxysmal atrial fibrillation) (HCC)        5. Hypercoagulability due to atrial fibrillation (HCC)        6. Primary hypertension        7. Atherosclerosis of aorta (HCC)            Medical Decision Making: Today's Assessment/Status/Plan:                        "

## 2023-05-03 RX ORDER — ATORVASTATIN CALCIUM 80 MG/1
TABLET, FILM COATED ORAL
Qty: 100 TABLET | Refills: 3 | Status: SHIPPED | OUTPATIENT
Start: 2023-05-03 | End: 2023-12-14

## 2023-05-03 NOTE — TELEPHONE ENCOUNTER
Received request via: Pharmacy    Was the patient seen in the last year in this department? Yes    Does the patient have an active prescription (recently filled or refills available) for medication(s) requested? No    Does the patient have skilled nursing Plus and need 100 day supply (blood pressure, diabetes and cholesterol meds only)? Yes, quantity updated to 100 days

## 2023-05-09 RX ORDER — APIXABAN 5 MG/1
TABLET, FILM COATED ORAL
Qty: 180 TABLET | Refills: 2 | Status: SHIPPED | OUTPATIENT
Start: 2023-05-09 | End: 2024-01-16 | Stop reason: SDUPTHER

## 2023-05-17 RX ORDER — SITAGLIPTIN 50 MG/1
TABLET, FILM COATED ORAL
Qty: 100 TABLET | Refills: 0 | Status: CANCELLED | OUTPATIENT
Start: 2023-05-17

## 2023-05-17 RX ORDER — LINAGLIPTIN 5 MG/1
5 TABLET, FILM COATED ORAL DAILY
Qty: 100 TABLET | Refills: 3 | Status: SHIPPED | OUTPATIENT
Start: 2023-05-17

## 2023-06-02 ENCOUNTER — DOCUMENTATION (OUTPATIENT)
Dept: HEALTH INFORMATION MANAGEMENT | Facility: OTHER | Age: 68
End: 2023-06-02
Payer: MEDICARE

## 2023-06-06 ENCOUNTER — HOSPITAL ENCOUNTER (OUTPATIENT)
Dept: LAB | Facility: MEDICAL CENTER | Age: 68
End: 2023-06-06
Attending: INTERNAL MEDICINE
Payer: MEDICARE

## 2023-06-06 LAB
25(OH)D3 SERPL-MCNC: 41 NG/ML (ref 30–100)
ALBUMIN SERPL BCP-MCNC: 4.2 G/DL (ref 3.2–4.9)
ALBUMIN/GLOB SERPL: 1.8 G/DL
ALP SERPL-CCNC: 77 U/L (ref 30–99)
ALT SERPL-CCNC: 15 U/L (ref 2–50)
ANION GAP SERPL CALC-SCNC: 12 MMOL/L (ref 7–16)
APPEARANCE UR: CLEAR
AST SERPL-CCNC: 16 U/L (ref 12–45)
BASOPHILS # BLD AUTO: 0.8 % (ref 0–1.8)
BASOPHILS # BLD: 0.04 K/UL (ref 0–0.12)
BILIRUB SERPL-MCNC: 0.6 MG/DL (ref 0.1–1.5)
BILIRUB UR QL STRIP.AUTO: NEGATIVE
BUN SERPL-MCNC: 42 MG/DL (ref 8–22)
CALCIUM ALBUM COR SERPL-MCNC: 9.3 MG/DL (ref 8.5–10.5)
CALCIUM SERPL-MCNC: 9.5 MG/DL (ref 8.5–10.5)
CHLORIDE SERPL-SCNC: 107 MMOL/L (ref 96–112)
CO2 SERPL-SCNC: 23 MMOL/L (ref 20–33)
COLOR UR: YELLOW
CREAT SERPL-MCNC: 2.04 MG/DL (ref 0.5–1.4)
CREAT UR-MCNC: 45.64 MG/DL
CREAT UR-MCNC: 48.66 MG/DL
EOSINOPHIL # BLD AUTO: 0.12 K/UL (ref 0–0.51)
EOSINOPHIL NFR BLD: 2.3 % (ref 0–6.9)
ERYTHROCYTE [DISTWIDTH] IN BLOOD BY AUTOMATED COUNT: 48.3 FL (ref 35.9–50)
EST. AVERAGE GLUCOSE BLD GHB EST-MCNC: 154 MG/DL
FASTING STATUS PATIENT QL REPORTED: NORMAL
GFR SERPLBLD CREATININE-BSD FMLA CKD-EPI: 35 ML/MIN/1.73 M 2
GLOBULIN SER CALC-MCNC: 2.4 G/DL (ref 1.9–3.5)
GLUCOSE SERPL-MCNC: 102 MG/DL (ref 65–99)
GLUCOSE UR STRIP.AUTO-MCNC: NEGATIVE MG/DL
HBA1C MFR BLD: 7 % (ref 4–5.6)
HCT VFR BLD AUTO: 43.7 % (ref 42–52)
HGB BLD-MCNC: 13.4 G/DL (ref 14–18)
IMM GRANULOCYTES # BLD AUTO: 0.02 K/UL (ref 0–0.11)
IMM GRANULOCYTES NFR BLD AUTO: 0.4 % (ref 0–0.9)
KETONES UR STRIP.AUTO-MCNC: NEGATIVE MG/DL
LEUKOCYTE ESTERASE UR QL STRIP.AUTO: NEGATIVE
LYMPHOCYTES # BLD AUTO: 1.05 K/UL (ref 1–4.8)
LYMPHOCYTES NFR BLD: 20.2 % (ref 22–41)
MAGNESIUM SERPL-MCNC: 1.8 MG/DL (ref 1.5–2.5)
MCH RBC QN AUTO: 28.2 PG (ref 27–33)
MCHC RBC AUTO-ENTMCNC: 30.7 G/DL (ref 32.3–36.5)
MCV RBC AUTO: 92 FL (ref 81.4–97.8)
MICRO URNS: NORMAL
MICROALBUMIN UR-MCNC: <1.2 MG/DL
MICROALBUMIN/CREAT UR: NORMAL MG/G (ref 0–30)
MONOCYTES # BLD AUTO: 0.59 K/UL (ref 0–0.85)
MONOCYTES NFR BLD AUTO: 11.3 % (ref 0–13.4)
NEUTROPHILS # BLD AUTO: 3.38 K/UL (ref 1.82–7.42)
NEUTROPHILS NFR BLD: 65 % (ref 44–72)
NITRITE UR QL STRIP.AUTO: NEGATIVE
NRBC # BLD AUTO: 0 K/UL
NRBC BLD-RTO: 0 /100 WBC (ref 0–0.2)
PH UR STRIP.AUTO: 6 [PH] (ref 5–8)
PHOSPHATE SERPL-MCNC: 3 MG/DL (ref 2.5–4.5)
PLATELET # BLD AUTO: 93 K/UL (ref 164–446)
PLATELETS.RETICULATED NFR BLD AUTO: 9.7 % (ref 0.6–13.1)
PMV BLD AUTO: 13 FL (ref 9–12.9)
POTASSIUM SERPL-SCNC: 4.9 MMOL/L (ref 3.6–5.5)
PROT SERPL-MCNC: 6.6 G/DL (ref 6–8.2)
PROT UR QL STRIP: NEGATIVE MG/DL
PROT UR-MCNC: 4 MG/DL (ref 0–15)
PROT/CREAT UR: 82 MG/G (ref 15–68)
PTH-INTACT SERPL-MCNC: 51.4 PG/ML (ref 14–72)
RBC # BLD AUTO: 4.75 M/UL (ref 4.7–6.1)
RBC UR QL AUTO: NEGATIVE
SODIUM SERPL-SCNC: 142 MMOL/L (ref 135–145)
SP GR UR STRIP.AUTO: 1.01
URATE SERPL-MCNC: 7.5 MG/DL (ref 2.5–8.3)
UROBILINOGEN UR STRIP.AUTO-MCNC: 0.2 MG/DL
WBC # BLD AUTO: 5.2 K/UL (ref 4.8–10.8)

## 2023-06-06 PROCEDURE — 83970 ASSAY OF PARATHORMONE: CPT

## 2023-06-06 PROCEDURE — 80197 ASSAY OF TACROLIMUS: CPT

## 2023-06-06 PROCEDURE — 36415 COLL VENOUS BLD VENIPUNCTURE: CPT

## 2023-06-06 PROCEDURE — 85025 COMPLETE CBC W/AUTO DIFF WBC: CPT

## 2023-06-06 PROCEDURE — 82306 VITAMIN D 25 HYDROXY: CPT

## 2023-06-06 PROCEDURE — 83036 HEMOGLOBIN GLYCOSYLATED A1C: CPT

## 2023-06-06 PROCEDURE — 80053 COMPREHEN METABOLIC PANEL: CPT

## 2023-06-06 PROCEDURE — 85055 RETICULATED PLATELET ASSAY: CPT

## 2023-06-06 PROCEDURE — 82043 UR ALBUMIN QUANTITATIVE: CPT

## 2023-06-06 PROCEDURE — 82570 ASSAY OF URINE CREATININE: CPT

## 2023-06-06 PROCEDURE — 81003 URINALYSIS AUTO W/O SCOPE: CPT

## 2023-06-06 PROCEDURE — 84550 ASSAY OF BLOOD/URIC ACID: CPT

## 2023-06-06 PROCEDURE — 84100 ASSAY OF PHOSPHORUS: CPT

## 2023-06-06 PROCEDURE — 84156 ASSAY OF PROTEIN URINE: CPT

## 2023-06-06 PROCEDURE — 83735 ASSAY OF MAGNESIUM: CPT

## 2023-06-07 LAB — TACROLIMUS BLD-MCNC: 6.3 NG/ML (ref 5–20)

## 2023-06-20 ENCOUNTER — DOCUMENTATION (OUTPATIENT)
Dept: HEALTH INFORMATION MANAGEMENT | Facility: OTHER | Age: 68
End: 2023-06-20

## 2023-06-20 ENCOUNTER — OFFICE VISIT (OUTPATIENT)
Dept: MEDICAL GROUP | Facility: PHYSICIAN GROUP | Age: 68
End: 2023-06-20
Payer: MEDICARE

## 2023-06-20 ENCOUNTER — PATIENT MESSAGE (OUTPATIENT)
Dept: HEALTH INFORMATION MANAGEMENT | Facility: OTHER | Age: 68
End: 2023-06-20

## 2023-06-20 VITALS
WEIGHT: 241.5 LBS | HEIGHT: 77 IN | DIASTOLIC BLOOD PRESSURE: 72 MMHG | BODY MASS INDEX: 28.51 KG/M2 | TEMPERATURE: 97 F | HEART RATE: 40 BPM | OXYGEN SATURATION: 98 % | RESPIRATION RATE: 20 BRPM | SYSTOLIC BLOOD PRESSURE: 132 MMHG

## 2023-06-20 DIAGNOSIS — D69.6 THROMBOCYTOPENIA (HCC): ICD-10-CM

## 2023-06-20 DIAGNOSIS — I10 PRIMARY HYPERTENSION: ICD-10-CM

## 2023-06-20 DIAGNOSIS — E11.21 TYPE 2 DIABETES MELLITUS WITH DIABETIC NEPHROPATHY, WITHOUT LONG-TERM CURRENT USE OF INSULIN (HCC): ICD-10-CM

## 2023-06-20 DIAGNOSIS — D63.8 ANEMIA OF CHRONIC DISEASE: ICD-10-CM

## 2023-06-20 PROBLEM — J01.00 ACUTE NON-RECURRENT MAXILLARY SINUSITIS: Status: RESOLVED | Noted: 2023-02-09 | Resolved: 2023-06-20

## 2023-06-20 PROBLEM — A08.4 VIRAL GASTROENTERITIS: Status: RESOLVED | Noted: 2022-12-06 | Resolved: 2023-06-20

## 2023-06-20 PROBLEM — H10.13 ALLERGIC CONJUNCTIVITIS OF BOTH EYES: Status: RESOLVED | Noted: 2023-03-10 | Resolved: 2023-06-20

## 2023-06-20 PROCEDURE — 99214 OFFICE O/P EST MOD 30 MIN: CPT | Performed by: FAMILY MEDICINE

## 2023-06-20 PROCEDURE — 3078F DIAST BP <80 MM HG: CPT | Performed by: FAMILY MEDICINE

## 2023-06-20 PROCEDURE — 3075F SYST BP GE 130 - 139MM HG: CPT | Performed by: FAMILY MEDICINE

## 2023-06-20 RX ORDER — CEPHALEXIN 500 MG/1
CAPSULE ORAL
COMMUNITY
Start: 2023-06-14 | End: 2023-07-10

## 2023-06-20 ASSESSMENT — FIBROSIS 4 INDEX: FIB4 SCORE: 2.93

## 2023-06-20 NOTE — ASSESSMENT & PLAN NOTE
This is a chronic stable condition.  His hemoglobin has improved slightly.  We will continue to follow.

## 2023-06-20 NOTE — ASSESSMENT & PLAN NOTE
This is a chronic stable condition.  Blood pressure remains under good control on current medications.

## 2023-06-20 NOTE — ASSESSMENT & PLAN NOTE
This is a chronic problem.  His platelet count was slightly lower on his last test.  That will be rechecked again in 2 months.

## 2023-06-20 NOTE — ASSESSMENT & PLAN NOTE
This is a chronic problem.  Patient is here for follow-up.  He did just see his nephrologist yesterday.  As he has had a slight increase in his creatinine.  Patient states nothing is changed in his life or medical regiment.  There are not really sure what caused that.  On review of his labs his BUN was also elevated so it is possible he was a little dehydrated at the time of the test.  He is going to have his labs rechecked again in 2 months.  Patient states overall he is feeling really well.

## 2023-06-20 NOTE — PROGRESS NOTES
Subjective:     CC: Here for follow-up.    HPI:   Jama presents today with the following medical concerns:    Type 2 diabetes mellitus with kidney complication, without long-term current use of insulin (HCC)  This is a chronic problem.  Patient is here for follow-up.  He did just see his nephrologist yesterday.  As he has had a slight increase in his creatinine.  Patient states nothing is changed in his life or medical regiment.  There are not really sure what caused that.  On review of his labs his BUN was also elevated so it is possible he was a little dehydrated at the time of the test.  He is going to have his labs rechecked again in 2 months.  Patient states overall he is feeling really well.    Hypertension  This is a chronic stable condition.  Blood pressure remains under good control on current medications.    Thrombocytopenia (HCC)  This is a chronic problem.  His platelet count was slightly lower on his last test.  That will be rechecked again in 2 months.    Anemia of chronic disease  This is a chronic stable condition.  His hemoglobin has improved slightly.  We will continue to follow.    Past Medical History:   Diagnosis Date    Benign essential hypertension     Hyperlipoproteinemia     Hypertension     not on meds anymore    Pain     Polycystic kidney 9/10/10    RIGHT KIDNEY TRANSPLANT    Sleep apnea     Snoring        Social History     Tobacco Use    Smoking status: Never    Smokeless tobacco: Never   Vaping Use    Vaping Use: Never used   Substance Use Topics    Alcohol use: No    Drug use: No       Current Outpatient Medications Ordered in Epic   Medication Sig Dispense Refill    cephALEXin (KEFLEX) 500 MG Cap TAKE 1 CAPSULE BY MOUTH THREE TIMES DAILY FOR 14 DAYS      linagliptin (TRADJENTA) 5 MG Tab tablet Take 1 Tablet by mouth every day. 100 Tablet 3    ELIQUIS 5 MG Tab TAKE 1 TABLET BY MOUTH TWICE DAILY 180 Tablet 2    atorvastatin (LIPITOR) 80 MG tablet TAKE ONE TABLET BY MOUTH AT BEDTIME 100  "Tablet 3    omeprazole (PRILOSEC) 20 MG delayed-release capsule Take 1 Capsule by mouth every day. 90 Capsule 3    gabapentin (NEURONTIN) 300 MG Cap TAKE 2 CAPSULES BY MOUTH TWICE DAILY 360 Capsule 1    erythromycin 5 MG/GM Ointment 1 dose left eye three times daily for 7 days. 3.5 g 0    olopatadine (PATANOL) 0.1 % ophthalmic solution Administer 1 Drop into both eyes 2 times a day. 5 mL 1    mycophenolate (CELLCEPT) 500 MG tablet       glyBURIDE (DIABETA) 5 MG Tab Take 2 Tablets by mouth 2 times a day with meals. 400 Tablet 3    metoprolol SR (TOPROL XL) 25 MG TABLET SR 24 HR Take 1 Tablet by mouth every day. 90 Tablet 3    tadalafil (CIALIS) 5 MG tablet : TAKE 1 TABLETS 30 MINUTES BEFORE SEXUAL ACTIVITY, DO NOT EXCEED MORE THAN ONE DOSE A DAY 15 Tablet 3    pioglitazone (ACTOS) 45 MG Tab Take 1 Tablet by mouth every day. 100 Tablet 3    predniSONE (DELTASONE) 5 MG Tab Take 1 Tablet by mouth every day. 30 Tablet 0    tacrolimus (PROGRAF) 1 MG Cap Take 1 Capsule by mouth 2 times a day. (Patient taking differently: Take 1 mg by mouth 2 times a day. 0800  2000) 60 Capsule 3     No current [facility]-ordered facility-administered medications on file.       Allergies:  Doxycycline    Health Maintenance: Completed    ROS:  Gen: no fevers/chills, no changes in weight  Eyes: no changes in vision  ENT: no sore throat, no hearing loss, no bloody nose  Pulm: no sob, no cough  CV: no chest pain, no palpitations  GI: no nausea/vomiting, no diarrhea  : no dysuria  MSk: no myalgias  Skin: no rash  Neuro: no headaches, no numbness/tingling  Heme/Lymph: no easy bruising      Objective:       Exam:  /72 (BP Location: Left arm, Patient Position: Sitting, BP Cuff Size: Large adult)   Pulse (!) 40   Temp 36.1 °C (97 °F) (Temporal)   Resp 20   Ht 1.956 m (6' 5\")   Wt 110 kg (241 lb 8 oz)   SpO2 98%   BMI 28.64 kg/m²  Body mass index is 28.64 kg/m².    Gen: Alert and oriented, No apparent distress.  Neck: Neck is supple without " lymphadenopathy.  Lungs: Normal effort, CTA bilaterally, no wheezes, rhonchi, or rales  CV: Regular rate and rhythm. No murmurs, rubs, or gallops.  Ext: No clubbing, cyanosis, edema.      Labs: Reviewed    Assessment & Plan:     67 y.o. male with the following -     1. Type 2 diabetes mellitus with diabetic nephropathy, without long-term current use of insulin (HCC)  This is a chronic problem.  Continue to follow.  Lab test results discussed.    2. Primary hypertension  This is a chronic stable condition.  Continue current medications.    3. Thrombocytopenia (HCC)  This is a chronic problem.  This platelet count slightly worsened on his last test.  We will continue to follow.    4. Anemia of chronic disease  This is a chronic problem.  It was a little improved on last visit.  Continue to follow.      Return in about 3 months (around 9/20/2023) for Long.    Please note that this dictation was created using voice recognition software. I have made every reasonable attempt to correct obvious errors, but I expect that there are errors of grammar and possibly content that I did not discover before finalizing the note.

## 2023-07-10 ENCOUNTER — ANTICOAGULATION VISIT (OUTPATIENT)
Dept: MEDICAL GROUP | Facility: PHYSICIAN GROUP | Age: 68
End: 2023-07-10
Payer: MEDICARE

## 2023-07-10 VITALS — HEART RATE: 46 BPM | DIASTOLIC BLOOD PRESSURE: 80 MMHG | SYSTOLIC BLOOD PRESSURE: 127 MMHG

## 2023-07-10 DIAGNOSIS — Z79.01 LONG TERM (CURRENT) USE OF ANTICOAGULANTS: Primary | ICD-10-CM

## 2023-07-10 DIAGNOSIS — I82.622 ACUTE DEEP VEIN THROMBOSIS (DVT) OF LEFT UPPER EXTREMITY, UNSPECIFIED VEIN (HCC): ICD-10-CM

## 2023-07-10 DIAGNOSIS — Z86.79 HISTORY OF ATRIAL FIBRILLATION: ICD-10-CM

## 2023-07-10 PROCEDURE — 99211 OFF/OP EST MAY X REQ PHY/QHP: CPT | Performed by: INTERNAL MEDICINE

## 2023-07-10 PROCEDURE — 3079F DIAST BP 80-89 MM HG: CPT | Performed by: INTERNAL MEDICINE

## 2023-07-10 PROCEDURE — 3074F SYST BP LT 130 MM HG: CPT | Performed by: INTERNAL MEDICINE

## 2023-07-10 NOTE — PROGRESS NOTES
Anticoagulation Summary  As of 7/10/2023      INR goal:     TTR:  61.4 % (2.4 mo)   Prior goal:  2.0-3.0   INR used for dosing:  No new INR was available at the time of this encounter.   Warfarin maintenance plan:  No maintenance plan   Plan last modified:  Neldia Flores, PharmD (10/10/2022)   Next INR check:  1/8/2024   Target end date:  Indefinite    Indications    History of atrial fibrillation [Z86.79]  Acute deep vein thrombosis (DVT) of left upper extremity (HCC) [I82.622]  Long term (current) use of anticoagulants [Z79.01]                 Anticoagulation Episode Summary       INR check location:      Preferred lab:      Send INR reminders to:      Comments:  Switch back to Eliquis for AF if DVT is resolved after 3 months (around 10/25/22)          Anticoagulation Care Providers       Provider Role Specialty Phone number    [facility] Responsible  [phone]          Anticoagulation Patient Findings       Target end date:Indefinite     Indication: Atrial fibrillation, DVT     Drug: Eliquis     CHADsVASC = at least 7    Health Status Since Last Assessment   Patient denies any new relevant medical problems, ED visits or hospitalizations   Patient denies any embolic events (stroke/tia/systemic embolism)    Adherence with DOAC Therapy   Pt has NO missed any doses in the average week    Bleeding Risk Assessment     Denies Epistaxis   Pt denies any excessive or unusual bleeding/hematomas.  Pt denies any GI bleeds or hematemesis.  Pt denies any concerning daily headache or sub dural hematoma symptoms.     Pt denies any hematuria   Latest Hemoglobin 13.4   ETOH overuse Negative     Creatinine Clearance/Renal Function     Latest ClCr 49.1 mL/min    Hepatic function   Latest LFTs WNL   Pt denies any history of liver dysfunction      Drug Interactions   Platelets: 93 - continuous monitored by Nephrology   ASA/other antiplatelets Negative   NSAID Negative   Other drug interactions Negative   X  Verified no anticonvulsant or azole therapy, education provided for future use.     Examination     Vitals:    07/10/23 0904   BP: 127/80   Pulse: (!) 46        Symptomatic hypotension Negative   Significant gait impairment/imbalance/high risk for falls? Negative    Final Assessment and Recommendations:   Patient appears stable from the anticoagulation standpoint.     Benefits of continued DOAC therapy outweigh risks for this patient   Recommend pt continue with current DOAC therapy Eliquis 5mg BID    DOAC is affordable     Other Actions: cmp/ cbc hemogram ordered prior to next visit    Follow up:   Will follow up with patient 6  months.      Scotty Faith, PharmD, BCACP

## 2023-07-28 DIAGNOSIS — Z86.79 HISTORY OF ATRIAL FIBRILLATION: ICD-10-CM

## 2023-08-09 ENCOUNTER — HOSPITAL ENCOUNTER (OUTPATIENT)
Dept: LAB | Facility: MEDICAL CENTER | Age: 68
End: 2023-08-09
Attending: INTERNAL MEDICINE
Payer: MEDICARE

## 2023-08-09 LAB
ALBUMIN SERPL BCP-MCNC: 4.6 G/DL (ref 3.2–4.9)
APPEARANCE UR: CLEAR
BASOPHILS # BLD AUTO: 0.6 % (ref 0–1.8)
BASOPHILS # BLD: 0.04 K/UL (ref 0–0.12)
BILIRUB UR QL STRIP.AUTO: NEGATIVE
BUN SERPL-MCNC: 52 MG/DL (ref 8–22)
CALCIUM ALBUM COR SERPL-MCNC: 8.9 MG/DL (ref 8.5–10.5)
CALCIUM SERPL-MCNC: 9.4 MG/DL (ref 8.5–10.5)
CHLORIDE SERPL-SCNC: 106 MMOL/L (ref 96–112)
CO2 SERPL-SCNC: 20 MMOL/L (ref 20–33)
COLOR UR: YELLOW
CREAT SERPL-MCNC: 2.02 MG/DL (ref 0.5–1.4)
CREAT UR-MCNC: 80.38 MG/DL
CREAT UR-MCNC: 80.74 MG/DL
EOSINOPHIL # BLD AUTO: 0.06 K/UL (ref 0–0.51)
EOSINOPHIL NFR BLD: 0.9 % (ref 0–6.9)
ERYTHROCYTE [DISTWIDTH] IN BLOOD BY AUTOMATED COUNT: 49.8 FL (ref 35.9–50)
FASTING STATUS PATIENT QL REPORTED: NORMAL
GFR SERPLBLD CREATININE-BSD FMLA CKD-EPI: 36 ML/MIN/1.73 M 2
GLUCOSE SERPL-MCNC: 71 MG/DL (ref 65–99)
GLUCOSE UR STRIP.AUTO-MCNC: NEGATIVE MG/DL
HCT VFR BLD AUTO: 45 % (ref 42–52)
HGB BLD-MCNC: 13.8 G/DL (ref 14–18)
IMM GRANULOCYTES # BLD AUTO: 0.04 K/UL (ref 0–0.11)
IMM GRANULOCYTES NFR BLD AUTO: 0.6 % (ref 0–0.9)
KETONES UR STRIP.AUTO-MCNC: NEGATIVE MG/DL
LEUKOCYTE ESTERASE UR QL STRIP.AUTO: NEGATIVE
LYMPHOCYTES # BLD AUTO: 1.13 K/UL (ref 1–4.8)
LYMPHOCYTES NFR BLD: 17.4 % (ref 22–41)
MAGNESIUM SERPL-MCNC: 1.8 MG/DL (ref 1.5–2.5)
MCH RBC QN AUTO: 28.2 PG (ref 27–33)
MCHC RBC AUTO-ENTMCNC: 30.7 G/DL (ref 32.3–36.5)
MCV RBC AUTO: 91.8 FL (ref 81.4–97.8)
MICRO URNS: NORMAL
MICROALBUMIN UR-MCNC: <1.2 MG/DL
MICROALBUMIN/CREAT UR: NORMAL MG/G (ref 0–30)
MONOCYTES # BLD AUTO: 0.8 K/UL (ref 0–0.85)
MONOCYTES NFR BLD AUTO: 12.3 % (ref 0–13.4)
NEUTROPHILS # BLD AUTO: 4.43 K/UL (ref 1.82–7.42)
NEUTROPHILS NFR BLD: 68.2 % (ref 44–72)
NITRITE UR QL STRIP.AUTO: NEGATIVE
NRBC # BLD AUTO: 0 K/UL
NRBC BLD-RTO: 0 /100 WBC (ref 0–0.2)
PH UR STRIP.AUTO: 6 [PH] (ref 5–8)
PHOSPHATE SERPL-MCNC: 3.4 MG/DL (ref 2.5–4.5)
PLATELET # BLD AUTO: 104 K/UL (ref 164–446)
PMV BLD AUTO: 12.8 FL (ref 9–12.9)
POTASSIUM SERPL-SCNC: 4.6 MMOL/L (ref 3.6–5.5)
PROT UR QL STRIP: NEGATIVE MG/DL
PROT UR-MCNC: 9 MG/DL (ref 0–15)
PROT/CREAT UR: 112 MG/G (ref 15–68)
RBC # BLD AUTO: 4.9 M/UL (ref 4.7–6.1)
RBC UR QL AUTO: NEGATIVE
SODIUM SERPL-SCNC: 139 MMOL/L (ref 135–145)
SP GR UR STRIP.AUTO: 1.02
TACROLIMUS BLD-MCNC: 7.5 NG/ML (ref 5–20)
URATE SERPL-MCNC: 7.6 MG/DL (ref 2.5–8.3)
UROBILINOGEN UR STRIP.AUTO-MCNC: 0.2 MG/DL
WBC # BLD AUTO: 6.5 K/UL (ref 4.8–10.8)

## 2023-08-09 PROCEDURE — 80197 ASSAY OF TACROLIMUS: CPT

## 2023-08-09 PROCEDURE — 83735 ASSAY OF MAGNESIUM: CPT

## 2023-08-09 PROCEDURE — 36415 COLL VENOUS BLD VENIPUNCTURE: CPT

## 2023-08-09 PROCEDURE — 85025 COMPLETE CBC W/AUTO DIFF WBC: CPT

## 2023-08-09 PROCEDURE — 84550 ASSAY OF BLOOD/URIC ACID: CPT

## 2023-08-09 PROCEDURE — 80069 RENAL FUNCTION PANEL: CPT

## 2023-08-09 PROCEDURE — 81003 URINALYSIS AUTO W/O SCOPE: CPT

## 2023-08-09 PROCEDURE — 82570 ASSAY OF URINE CREATININE: CPT

## 2023-08-09 PROCEDURE — 84156 ASSAY OF PROTEIN URINE: CPT

## 2023-08-09 PROCEDURE — 82043 UR ALBUMIN QUANTITATIVE: CPT

## 2023-09-11 RX ORDER — GABAPENTIN 300 MG/1
CAPSULE ORAL
Qty: 360 CAPSULE | Refills: 1 | Status: ON HOLD | OUTPATIENT
Start: 2023-09-11 | End: 2024-03-11

## 2023-09-19 ENCOUNTER — OFFICE VISIT (OUTPATIENT)
Dept: MEDICAL GROUP | Facility: PHYSICIAN GROUP | Age: 68
End: 2023-09-19
Payer: MEDICARE

## 2023-09-19 VITALS
TEMPERATURE: 97.4 F | SYSTOLIC BLOOD PRESSURE: 124 MMHG | DIASTOLIC BLOOD PRESSURE: 70 MMHG | BODY MASS INDEX: 28.1 KG/M2 | OXYGEN SATURATION: 98 % | HEIGHT: 77 IN | RESPIRATION RATE: 18 BRPM | WEIGHT: 238 LBS | HEART RATE: 62 BPM

## 2023-09-19 DIAGNOSIS — M79.672 PAIN IN BOTH FEET: ICD-10-CM

## 2023-09-19 DIAGNOSIS — N18.32 STAGE 3B CHRONIC KIDNEY DISEASE: ICD-10-CM

## 2023-09-19 DIAGNOSIS — E11.40 TYPE 2 DIABETES MELLITUS WITH DIABETIC NEUROPATHY, WITHOUT LONG-TERM CURRENT USE OF INSULIN (HCC): ICD-10-CM

## 2023-09-19 DIAGNOSIS — E11.21 TYPE 2 DIABETES MELLITUS WITH DIABETIC NEPHROPATHY, WITHOUT LONG-TERM CURRENT USE OF INSULIN (HCC): ICD-10-CM

## 2023-09-19 DIAGNOSIS — M79.671 PAIN IN BOTH FEET: ICD-10-CM

## 2023-09-19 DIAGNOSIS — D63.8 ANEMIA OF CHRONIC DISEASE: ICD-10-CM

## 2023-09-19 DIAGNOSIS — Z23 NEED FOR VACCINATION: ICD-10-CM

## 2023-09-19 PROBLEM — A41.9 SEPTIC SHOCK (HCC): Status: RESOLVED | Noted: 2022-07-25 | Resolved: 2023-09-19

## 2023-09-19 PROBLEM — B96.20 BACTEREMIA DUE TO ESCHERICHIA COLI: Status: RESOLVED | Noted: 2022-05-26 | Resolved: 2023-09-19

## 2023-09-19 PROBLEM — R65.21 SEPTIC SHOCK (HCC): Status: RESOLVED | Noted: 2022-07-25 | Resolved: 2023-09-19

## 2023-09-19 PROBLEM — R78.81 BACTEREMIA DUE TO ESCHERICHIA COLI: Status: RESOLVED | Noted: 2022-05-26 | Resolved: 2023-09-19

## 2023-09-19 PROBLEM — E66.3 OVERWEIGHT (BMI 25.0-29.9): Status: RESOLVED | Noted: 2021-12-16 | Resolved: 2023-09-19

## 2023-09-19 PROBLEM — N39.0 URINARY TRACT INFECTION: Status: RESOLVED | Noted: 2022-07-01 | Resolved: 2023-09-19

## 2023-09-19 LAB
HBA1C MFR BLD: 6.6 % (ref ?–5.8)
POCT INT CON NEG: NEGATIVE
POCT INT CON POS: POSITIVE

## 2023-09-19 PROCEDURE — 3074F SYST BP LT 130 MM HG: CPT | Performed by: FAMILY MEDICINE

## 2023-09-19 PROCEDURE — 83036 HEMOGLOBIN GLYCOSYLATED A1C: CPT | Performed by: FAMILY MEDICINE

## 2023-09-19 PROCEDURE — G0008 ADMIN INFLUENZA VIRUS VAC: HCPCS | Performed by: FAMILY MEDICINE

## 2023-09-19 PROCEDURE — 90662 IIV NO PRSV INCREASED AG IM: CPT | Performed by: FAMILY MEDICINE

## 2023-09-19 PROCEDURE — 3078F DIAST BP <80 MM HG: CPT | Performed by: FAMILY MEDICINE

## 2023-09-19 PROCEDURE — 99214 OFFICE O/P EST MOD 30 MIN: CPT | Mod: 25 | Performed by: FAMILY MEDICINE

## 2023-09-19 RX ORDER — DULOXETIN HYDROCHLORIDE 30 MG/1
30 CAPSULE, DELAYED RELEASE ORAL DAILY
Qty: 30 CAPSULE | Refills: 1 | Status: SHIPPED | OUTPATIENT
Start: 2023-09-19 | End: 2023-12-14

## 2023-09-19 RX ORDER — PIOGLITAZONEHYDROCHLORIDE 45 MG/1
45 TABLET ORAL DAILY
Qty: 100 TABLET | Refills: 3 | Status: SHIPPED | OUTPATIENT
Start: 2023-09-19

## 2023-09-19 ASSESSMENT — FIBROSIS 4 INDEX: FIB4 SCORE: 2.62

## 2023-09-19 NOTE — ASSESSMENT & PLAN NOTE
This is a chronic problem.  Recent CBC shows improvement of his anemia.  We will continue to follow.

## 2023-09-19 NOTE — ASSESSMENT & PLAN NOTE
This is a chronic problem.  Patient is followed regularly by his nephrologist.  Kidney test have remained stable.

## 2023-09-19 NOTE — PROGRESS NOTES
Subjective:     CC: Here for several issues.      HPI:   Jama presents today with The following medical concerns:    Type 2 diabetes mellitus with diabetic neuropathy, unspecified (HCC)  These are both chronic problems.  Patient is here for his hemoglobin A1c.  The point-of-care testing was improved to 6.6%.  He is also having persistent trouble with neuropathy.  He is on gabapentin 300 mg 2 twice daily which is a little above the recommended dose of 900 mg a day given his GFR.  We talked about other options and we will try him on duloxetine.  He is to check with his nephrologist on the gabapentin dose.    Type 2 diabetes mellitus with kidney complication, without long-term current use of insulin (HCC)  This is a chronic problem.  Patient is followed regularly by his nephrologist.  Kidney test have remained stable.    Anemia of chronic disease  This is a chronic problem.  Recent CBC shows improvement of his anemia.  We will continue to follow.    Pain in both feet  This is a chronic problem.  Patient has troubles with both of his feet.  He has seeing a foot specialist who recommended surgery to the left side and is been putting it off but will hopefully do it this winter.    Past Medical History:   Diagnosis Date    Benign essential hypertension     Hyperlipoproteinemia     Hypertension     not on meds anymore    Pain     Polycystic kidney 9/10/10    RIGHT KIDNEY TRANSPLANT    Sleep apnea     Snoring        Social History     Tobacco Use    Smoking status: Never    Smokeless tobacco: Never   Vaping Use    Vaping Use: Never used   Substance Use Topics    Alcohol use: No    Drug use: No       Current Outpatient Medications Ordered in Epic   Medication Sig Dispense Refill    DULoxetine (CYMBALTA) 30 MG Cap DR Particles Take 1 Capsule by mouth every day. 30 Capsule 1    gabapentin (NEURONTIN) 300 MG Cap TAKE 2 CAPSULES BY MOUTH TWICE DAILY 360 Capsule 1    linagliptin (TRADJENTA) 5 MG Tab tablet Take 1 Tablet by mouth  "every day. 100 Tablet 3    ELIQUIS 5 MG Tab TAKE 1 TABLET BY MOUTH TWICE DAILY 180 Tablet 2    atorvastatin (LIPITOR) 80 MG tablet TAKE ONE TABLET BY MOUTH AT BEDTIME 100 Tablet 3    omeprazole (PRILOSEC) 20 MG delayed-release capsule Take 1 Capsule by mouth every day. 90 Capsule 3    olopatadine (PATANOL) 0.1 % ophthalmic solution Administer 1 Drop into both eyes 2 times a day. 5 mL 1    mycophenolate (CELLCEPT) 500 MG tablet       glyBURIDE (DIABETA) 5 MG Tab Take 2 Tablets by mouth 2 times a day with meals. 400 Tablet 3    metoprolol SR (TOPROL XL) 25 MG TABLET SR 24 HR Take 1 Tablet by mouth every day. 90 Tablet 3    tadalafil (CIALIS) 5 MG tablet : TAKE 1 TABLETS 30 MINUTES BEFORE SEXUAL ACTIVITY, DO NOT EXCEED MORE THAN ONE DOSE A DAY 15 Tablet 3    pioglitazone (ACTOS) 45 MG Tab Take 1 Tablet by mouth every day. 100 Tablet 3    predniSONE (DELTASONE) 5 MG Tab Take 1 Tablet by mouth every day. 30 Tablet 0    tacrolimus (PROGRAF) 1 MG Cap Take 1 Capsule by mouth 2 times a day. (Patient taking differently: Take 1 mg by mouth 2 times a day. 0800  2000) 60 Capsule 3     No current [facility]-ordered facility-administered medications on file.       Allergies:  Doxycycline    Health Maintenance: Completed    ROS:  Gen: no fevers/chills, no changes in weight  Eyes: no changes in vision  ENT: no sore throat, no hearing loss, no bloody nose  Pulm: no sob, no cough  CV: no chest pain, no palpitations  GI: no nausea/vomiting, no diarrhea  : no dysuria  Skin: no rash  Neuro: no headaches, no numbness/tingling  Heme/Lymph: no easy bruising      Objective:       Exam:  /70 (BP Location: Left arm, Patient Position: Sitting, BP Cuff Size: Adult)   Pulse 62   Temp 36.3 °C (97.4 °F) (Temporal)   Resp 18   Ht 1.956 m (6' 5\")   Wt 108 kg (238 lb)   SpO2 98%   BMI 28.22 kg/m²  Body mass index is 28.22 kg/m².    Gen: Alert and oriented, No apparent distress.  Ext: No clubbing, cyanosis, edema.      Labs: Point-of-care " hemoglobin A1c is 6.6%    Assessment & Plan:     67 y.o. male with the following -     1. Need for vaccination  Flu vaccine given today.  - Influenza Vaccine, High Dose (65+ Only)    2. Type 2 diabetes mellitus with diabetic nephropathy, without long-term current use of insulin (HCC)  This is a chronic problem.  Hemoglobin A1c is under good control.  His kidney test remains stable.  - POCT  A1C    3. Stage 3b chronic kidney disease (HCC)  This is a chronic problem.  Continue care through nephrology.    4. Anemia of chronic disease  This is a chronic improved problem.  Continue to follow.    5. Type 2 diabetes mellitus with diabetic neuropathy, without long-term current use of insulin (HCC)  This is a chronic problem.  We will try him on duloxetine 30 mg a day.  He is to report back in 1 to 2 weeks on response as we can go higher if needed.  If it does seem to help then will reduce or stop the gabapentin.    6. Pain in both feet  This is a chronic problem.  Continue care through his foot specialist.      Return in about 3 months (around 12/19/2023) for Long.    Please note that this dictation was created using voice recognition software. I have made every reasonable attempt to correct obvious errors, but I expect that there are errors of grammar and possibly content that I did not discover before finalizing the note.

## 2023-09-19 NOTE — ASSESSMENT & PLAN NOTE
This is a chronic problem.  Patient has troubles with both of his feet.  He has seeing a foot specialist who recommended surgery to the left side and is been putting it off but will hopefully do it this winter.

## 2023-09-19 NOTE — ASSESSMENT & PLAN NOTE
These are both chronic problems.  Patient is here for his hemoglobin A1c.  The point-of-care testing was improved to 6.6%.  He is also having persistent trouble with neuropathy.  He is on gabapentin 300 mg 2 twice daily which is a little above the recommended dose of 900 mg a day given his GFR.  We talked about other options and we will try him on duloxetine.  He is to check with his nephrologist on the gabapentin dose.

## 2023-09-29 NOTE — ASSESSMENT & PLAN NOTE
Addended by: SEBAS ACHARYA on: 9/29/2023 09:27 AM     Modules accepted: Orders     Likely due to sepsis, no signs of bleeding  Continue to monitor

## 2023-10-11 ENCOUNTER — OFFICE VISIT (OUTPATIENT)
Dept: MEDICAL GROUP | Facility: PHYSICIAN GROUP | Age: 68
End: 2023-10-11
Payer: MEDICARE

## 2023-10-11 VITALS
SYSTOLIC BLOOD PRESSURE: 124 MMHG | WEIGHT: 241 LBS | OXYGEN SATURATION: 97 % | HEART RATE: 68 BPM | DIASTOLIC BLOOD PRESSURE: 70 MMHG | BODY MASS INDEX: 28.46 KG/M2 | TEMPERATURE: 97.7 F | HEIGHT: 77 IN

## 2023-10-11 DIAGNOSIS — B02.9 HERPES ZOSTER WITHOUT COMPLICATION: ICD-10-CM

## 2023-10-11 PROCEDURE — 3078F DIAST BP <80 MM HG: CPT | Performed by: INTERNAL MEDICINE

## 2023-10-11 PROCEDURE — 99213 OFFICE O/P EST LOW 20 MIN: CPT | Performed by: INTERNAL MEDICINE

## 2023-10-11 PROCEDURE — 3074F SYST BP LT 130 MM HG: CPT | Performed by: INTERNAL MEDICINE

## 2023-10-11 RX ORDER — TRIAMCINOLONE ACETONIDE 1 MG/G
CREAM TOPICAL
Qty: 45 G | Refills: 3 | Status: SHIPPED | OUTPATIENT
Start: 2023-10-11 | End: 2023-12-14

## 2023-10-11 ASSESSMENT — FIBROSIS 4 INDEX: FIB4 SCORE: 2.62

## 2023-10-11 NOTE — PROGRESS NOTES
Subjective:     CC:   Chief Complaint   Patient presents with    Other     Has red bumps left side has some pain to it but mainly itchy          HPI:   Jama Altman is a 67-year-old male who presents for an acute medical visit.  He is a patient of Dr. Ganesh Benton.  The patient reports a 5-day history of a rash on his left thoracic region that is both itchy and painful.  He had been thinking that they were bug bites.  He is on immunosuppressive therapy for transplant.  He states he had one shingles vaccine.      Past Medical History:   Diagnosis Date    Benign essential hypertension     Hyperlipoproteinemia     Hypertension     not on meds anymore    Pain     Polycystic kidney 9/10/10    RIGHT KIDNEY TRANSPLANT    Sleep apnea     Snoring        Social History     Tobacco Use    Smoking status: Never    Smokeless tobacco: Never   Vaping Use    Vaping Use: Never used   Substance Use Topics    Alcohol use: No    Drug use: No       Current Outpatient Medications Ordered in Epic   Medication Sig Dispense Refill    triamcinolone acetonide (KENALOG) 0.1 % Cream Apply a small amount to affected area twice daily. 45 g 3    pioglitazone (ACTOS) 45 MG Tab TAKE ONE TABLET BY MOUTH ONCE DAILY 100 Tablet 3    DULoxetine (CYMBALTA) 30 MG Cap DR Particles Take 1 Capsule by mouth every day. 30 Capsule 1    gabapentin (NEURONTIN) 300 MG Cap TAKE 2 CAPSULES BY MOUTH TWICE DAILY 360 Capsule 1    linagliptin (TRADJENTA) 5 MG Tab tablet Take 1 Tablet by mouth every day. 100 Tablet 3    ELIQUIS 5 MG Tab TAKE 1 TABLET BY MOUTH TWICE DAILY 180 Tablet 2    atorvastatin (LIPITOR) 80 MG tablet TAKE ONE TABLET BY MOUTH AT BEDTIME 100 Tablet 3    omeprazole (PRILOSEC) 20 MG delayed-release capsule Take 1 Capsule by mouth every day. 90 Capsule 3    mycophenolate (CELLCEPT) 500 MG tablet       glyBURIDE (DIABETA) 5 MG Tab Take 2 Tablets by mouth 2 times a day with meals. 400 Tablet 3    metoprolol SR (TOPROL XL) 25 MG TABLET SR 24 HR Take 1  "Tablet by mouth every day. 90 Tablet 3    tadalafil (CIALIS) 5 MG tablet : TAKE 1 TABLETS 30 MINUTES BEFORE SEXUAL ACTIVITY, DO NOT EXCEED MORE THAN ONE DOSE A DAY 15 Tablet 3    predniSONE (DELTASONE) 5 MG Tab Take 1 Tablet by mouth every day. 30 Tablet 0    tacrolimus (PROGRAF) 1 MG Cap Take 1 Capsule by mouth 2 times a day. (Patient taking differently: Take 1 mg by mouth 2 times a day. 0800  2000) 60 Capsule 3    amoxicillin-clavulanate (AUGMENTIN) 875-125 MG Tab Take 1 Tablet by mouth 2 times a day for 10 days. 20 Tablet 0     No current Epic-ordered facility-administered medications on file.       Allergies:  Doxycycline    Health Maintenance: Completed    ROS:  No fevers or chills. No cough, chest pain, or shortness of breath.       Objective:       Exam:  /70   Pulse 68   Temp 36.5 °C (97.7 °F) (Temporal)   Ht 1.956 m (6' 5\")   Wt 109 kg (241 lb)   SpO2 97%   BMI 28.58 kg/m²  Body mass index is 28.58 kg/m².    Gen: Alert and oriented, No apparent distress.  Skin:  Vesicular rash left mid thoracic and axillary region, does not cross midline         Assessment & Plan:     67 y.o. male with the following -     Herpes zoster without complication  Acute condition.  Patient the patient presents with a 5-day history of painful and pruritic vesicular rash on his left thorax.  Discussed that the rash was consistent with herpes zoster.  He is outside of the effective timeframe for starting an antiviral and corticosteroids.  He is already on gabapentin for neuropathy.  -Start triamcinolone cream and Benadryl as needed  - triamcinolone acetonide (KENALOG) 0.1 % Cream; Apply a small amount to affected area twice daily.  Dispense: 45 g; Refill: 3        Return if symptoms worsen or fail to improve.    Please note that this dictation was created using voice recognition software. I have made every reasonable attempt to correct obvious errors, but I expect that there are errors of grammar and possibly content that " I did not discover before finalizing the note.

## 2023-10-12 ENCOUNTER — TELEPHONE (OUTPATIENT)
Dept: CARDIOLOGY | Facility: MEDICAL CENTER | Age: 68
End: 2023-10-12
Payer: MEDICARE

## 2023-10-16 ENCOUNTER — OFFICE VISIT (OUTPATIENT)
Dept: MEDICAL GROUP | Facility: PHYSICIAN GROUP | Age: 68
End: 2023-10-16
Payer: MEDICARE

## 2023-10-16 VITALS
SYSTOLIC BLOOD PRESSURE: 112 MMHG | WEIGHT: 237.56 LBS | HEART RATE: 77 BPM | BODY MASS INDEX: 28.05 KG/M2 | HEIGHT: 77 IN | OXYGEN SATURATION: 94 % | TEMPERATURE: 97.7 F | DIASTOLIC BLOOD PRESSURE: 56 MMHG | RESPIRATION RATE: 16 BRPM

## 2023-10-16 DIAGNOSIS — Z23 NEED FOR VACCINATION: ICD-10-CM

## 2023-10-16 DIAGNOSIS — J01.01 ACUTE RECURRENT MAXILLARY SINUSITIS: ICD-10-CM

## 2023-10-16 PROCEDURE — 3078F DIAST BP <80 MM HG: CPT | Performed by: PHYSICIAN ASSISTANT

## 2023-10-16 PROCEDURE — 90677 PCV20 VACCINE IM: CPT | Performed by: PHYSICIAN ASSISTANT

## 2023-10-16 PROCEDURE — 99214 OFFICE O/P EST MOD 30 MIN: CPT | Mod: 25 | Performed by: PHYSICIAN ASSISTANT

## 2023-10-16 PROCEDURE — 3074F SYST BP LT 130 MM HG: CPT | Performed by: PHYSICIAN ASSISTANT

## 2023-10-16 PROCEDURE — G0009 ADMIN PNEUMOCOCCAL VACCINE: HCPCS | Performed by: PHYSICIAN ASSISTANT

## 2023-10-16 RX ORDER — AMOXICILLIN AND CLAVULANATE POTASSIUM 875; 125 MG/1; MG/1
1 TABLET, FILM COATED ORAL 2 TIMES DAILY
Qty: 20 TABLET | Refills: 0 | Status: SHIPPED | OUTPATIENT
Start: 2023-10-16 | End: 2023-10-26

## 2023-10-16 ASSESSMENT — FIBROSIS 4 INDEX: FIB4 SCORE: 2.62

## 2023-10-16 ASSESSMENT — ENCOUNTER SYMPTOMS
CHILLS: 0
SHORTNESS OF BREATH: 0
FEVER: 0

## 2023-10-16 NOTE — PROGRESS NOTES
"Subjective:     CC: sinus issue    HPI:   Jama, patient of Dr. Benton, presents today with the following:    Diagnosed with shingles last week  Let midaxillary region  Improving with steroid cream and benadryl at night for the itching  NOW having:  Sinus congestion 10/9  Green mucous  Stable until yesterday when congestion worsened, more green mucous, more fatigue  Mucinex helps some  One episode of left lower jaw/tooth pain but resolved  No upper teeth pain  No maxillary/frontal sinus pain    Denies fever, chills, body aches      ROS:  Review of Systems   Constitutional:  Negative for chills and fever.   HENT:  Positive for congestion.    Respiratory:  Negative for shortness of breath.    Cardiovascular:  Negative for chest pain.       Objective:     Exam:  /56 (BP Location: Left arm, Patient Position: Sitting, BP Cuff Size: Large adult)   Pulse 77   Temp 36.5 °C (97.7 °F) (Temporal)   Resp 16   Ht 1.956 m (6' 5\")   Wt 108 kg (237 lb 9 oz)   SpO2 94%   BMI 28.17 kg/m²  Body mass index is 28.17 kg/m².    Physical Exam  Constitutional:       General: He is not in acute distress.     Appearance: Normal appearance. He is not toxic-appearing.   HENT:      Right Ear: Tympanic membrane, ear canal and external ear normal.      Left Ear: Tympanic membrane, ear canal and external ear normal.      Mouth/Throat:      Mouth: Mucous membranes are moist.      Pharynx: Oropharynx is clear. No oropharyngeal exudate or posterior oropharyngeal erythema.   Eyes:      Conjunctiva/sclera: Conjunctivae normal.   Cardiovascular:      Rate and Rhythm: Normal rate and regular rhythm.      Heart sounds: No murmur heard.  Pulmonary:      Effort: Pulmonary effort is normal. No respiratory distress.      Breath sounds: No wheezing, rhonchi or rales.   Skin:     General: Skin is warm.   Neurological:      General: No focal deficit present.      Mental Status: He is alert.           Assessment & Plan:     67 y.o. male with the " following -     1. Acute recurrent maxillary sinusitis  Acute, uncontrolled, somewhat recurring.  Patient gets about 1-2 sinus infections per year.  Starting Augmentin given patient's immunocompromise state.    2. Need for vaccination    - Pneumococcal Conjugate Vaccine 20-Valent (6 mos+)        Return if symptoms worsen or fail to improve.    Please note that this dictation was created using voice recognition software. I have made every reasonable attempt to correct obvious errors, but I expect that there are errors of grammar and possibly content that I did not discover before finalizing the note.

## 2023-10-18 ENCOUNTER — OFFICE VISIT (OUTPATIENT)
Dept: CARDIOLOGY | Facility: MEDICAL CENTER | Age: 68
End: 2023-10-18
Attending: INTERNAL MEDICINE
Payer: MEDICARE

## 2023-10-18 VITALS
OXYGEN SATURATION: 97 % | RESPIRATION RATE: 16 BRPM | HEIGHT: 77 IN | HEART RATE: 79 BPM | WEIGHT: 237 LBS | DIASTOLIC BLOOD PRESSURE: 74 MMHG | SYSTOLIC BLOOD PRESSURE: 116 MMHG | BODY MASS INDEX: 27.98 KG/M2

## 2023-10-18 DIAGNOSIS — I10 BENIGN ESSENTIAL HYPERTENSION: ICD-10-CM

## 2023-10-18 DIAGNOSIS — Z94.0 HISTORY OF RENAL TRANSPLANT: ICD-10-CM

## 2023-10-18 DIAGNOSIS — Z95.5 S/P INSERTION OF NON-DRUG ELUTING CORONARY ARTERY STENT: ICD-10-CM

## 2023-10-18 DIAGNOSIS — I25.2 OLD MYOCARDIAL INFARCTION: ICD-10-CM

## 2023-10-18 DIAGNOSIS — I25.10 CORONARY ARTERY DISEASE DUE TO LIPID RICH PLAQUE: ICD-10-CM

## 2023-10-18 DIAGNOSIS — I48.0 HYPERCOAGULABLE STATE DUE TO PAROXYSMAL ATRIAL FIBRILLATION (HCC): ICD-10-CM

## 2023-10-18 DIAGNOSIS — E11.9 DIABETES MELLITUS WITH COINCIDENT HYPERTENSION (HCC): ICD-10-CM

## 2023-10-18 DIAGNOSIS — I10 DIABETES MELLITUS WITH COINCIDENT HYPERTENSION (HCC): ICD-10-CM

## 2023-10-18 DIAGNOSIS — I25.83 CORONARY ARTERY DISEASE DUE TO LIPID RICH PLAQUE: ICD-10-CM

## 2023-10-18 DIAGNOSIS — I48.0 PAF (PAROXYSMAL ATRIAL FIBRILLATION) (HCC): ICD-10-CM

## 2023-10-18 DIAGNOSIS — D68.69 HYPERCOAGULABLE STATE DUE TO PAROXYSMAL ATRIAL FIBRILLATION (HCC): ICD-10-CM

## 2023-10-18 DIAGNOSIS — Q61.3 POLYCYSTIC KIDNEY: ICD-10-CM

## 2023-10-18 LAB — EKG IMPRESSION: NORMAL

## 2023-10-18 PROCEDURE — 93010 ELECTROCARDIOGRAM REPORT: CPT | Performed by: INTERNAL MEDICINE

## 2023-10-18 PROCEDURE — 3078F DIAST BP <80 MM HG: CPT | Performed by: INTERNAL MEDICINE

## 2023-10-18 PROCEDURE — 99213 OFFICE O/P EST LOW 20 MIN: CPT | Performed by: INTERNAL MEDICINE

## 2023-10-18 PROCEDURE — 99204 OFFICE O/P NEW MOD 45 MIN: CPT | Mod: 25 | Performed by: INTERNAL MEDICINE

## 2023-10-18 PROCEDURE — 93005 ELECTROCARDIOGRAM TRACING: CPT | Performed by: INTERNAL MEDICINE

## 2023-10-18 PROCEDURE — 3074F SYST BP LT 130 MM HG: CPT | Performed by: INTERNAL MEDICINE

## 2023-10-18 ASSESSMENT — FIBROSIS 4 INDEX: FIB4 SCORE: 2.62

## 2023-10-18 NOTE — PROGRESS NOTES
Chief Complaint   Patient presents with    New Patient     NP Dx:PAF (paroxysmal atrial fibrillation) (HCC)         Subjective     Jama Altman is a 67 y.o. male who presents today being referred over secondary to easy bruising with anticoagulation.  Patient also does not want to be on long-term.  PAF.  History of cardiac stenting at [facility] 2011.  Hypertension diabetes.  Renal transplant.  History of polycystic kidney disease.  History of atrial fibrillation but not too symptomatic.    Past Medical History:   Diagnosis Date    Benign essential hypertension     Hyperlipoproteinemia     Hypertension     not on meds anymore    Pain     Polycystic kidney 9/10/10    RIGHT KIDNEY TRANSPLANT    Sleep apnea     Snoring      Past Surgical History:   Procedure Laterality Date    KNEE MANIPULATION  2/16/2012    Performed by LATOYA CONNER at SURGERY [facility]    KNEE UNICOMPARTMENTAL  12/23/2011    Performed by LATOYA CONNER at SURGERY [facility]S    KNEE ARTHROSCOPY  12/23/2011    Performed by LATOYA CONNER at SURGERY [facility] ORS    KNEE ARTHROSCOPY  5/3/2011    Performed by HANANE GOLDMAN at SURGERY SAME DAY [facility] ORS    MENISCECTOMY, KNEE, MEDIAL  5/3/2011    Performed by HANANE GOLDMAN at SURGERY SAME DAY [facility] ORS    OTHER  9/10/10    RIGHT KIDNEY TRANSPLANT    KNEE ARTHROPLASTY TOTAL  1/12/07    RIGHT    KNEE ARTHROSCOPY  4/10/06    RIGHT    OTHER ORTHOPEDIC SURGERY  7/8/74    LEFT KNEE DEBRIDEMENT     History reviewed. No pertinent family history.  Social History     Socioeconomic History    Marital status:      Spouse name: Not on file    Number of children: Not on file    Years of education: Not on file    Highest education level: Not on file   Occupational History    Not on file   Tobacco Use    Smoking status: Never    Smokeless tobacco: Never   Vaping Use    Vaping Use: Never used   Substance and Sexual Activity    Alcohol use: No    Drug use: No    Sexual activity: Yes      Partners: Female   Other Topics Concern    Not on file   Social History Narrative    Not on file     Social Determinants of Health     Financial Resource Strain: Not on file   Food Insecurity: Not on file   Transportation Needs: Not on file   Physical Activity: Not on file   Stress: Not on file   Social Connections: Not on file   Intimate Partner Violence: Not on file   Housing Stability: Not on file     Allergies   Allergen Reactions    Doxycycline Rash     Sweats and shakes: 9/28/17: Clarified allergy with patient. Allergy was in 1998 and he doesn't remember what happened. He thought the medication is for pain.  Tolerates doxycycline 9/2017     Outpatient Encounter Medications as of 10/18/2023   Medication Sig Dispense Refill    amoxicillin-clavulanate (AUGMENTIN) 875-125 MG Tab Take 1 Tablet by mouth 2 times a day for 10 days. 20 Tablet 0    triamcinolone acetonide (KENALOG) 0.1 % Cream Apply a small amount to affected area twice daily. 45 g 3    pioglitazone (ACTOS) 45 MG Tab TAKE ONE TABLET BY MOUTH ONCE DAILY 100 Tablet 3    DULoxetine (CYMBALTA) 30 MG Cap DR Particles Take 1 Capsule by mouth every day. 30 Capsule 1    gabapentin (NEURONTIN) 300 MG Cap TAKE 2 CAPSULES BY MOUTH TWICE DAILY 360 Capsule 1    linagliptin (TRADJENTA) 5 MG Tab tablet Take 1 Tablet by mouth every day. 100 Tablet 3    ELIQUIS 5 MG Tab TAKE 1 TABLET BY MOUTH TWICE DAILY 180 Tablet 2    atorvastatin (LIPITOR) 80 MG tablet TAKE ONE TABLET BY MOUTH AT BEDTIME 100 Tablet 3    omeprazole (PRILOSEC) 20 MG delayed-release capsule Take 1 Capsule by mouth every day. 90 Capsule 3    mycophenolate (CELLCEPT) 500 MG tablet       glyBURIDE (DIABETA) 5 MG Tab Take 2 Tablets by mouth 2 times a day with meals. 400 Tablet 3    metoprolol SR (TOPROL XL) 25 MG TABLET SR 24 HR Take 1 Tablet by mouth every day. 90 Tablet 3    tadalafil (CIALIS) 5 MG tablet : TAKE 1 TABLETS 30 MINUTES BEFORE SEXUAL ACTIVITY, DO NOT EXCEED MORE THAN ONE DOSE A DAY 15 Tablet  "3    predniSONE (DELTASONE) 5 MG Tab Take 1 Tablet by mouth every day. 30 Tablet 0    tacrolimus (PROGRAF) 1 MG Cap Take 1 Capsule by mouth 2 times a day. (Patient taking differently: Take 1 mg by mouth 2 times a day. 0800  2000) 60 Capsule 3     No facility-administered encounter medications on file as of 10/18/2023.     ROS           Objective     /74 (BP Location: Left arm, Patient Position: Sitting, BP Cuff Size: Adult)   Pulse 79   Resp 16   Ht 1.956 m (6' 5\")   Wt 108 kg (237 lb)   SpO2 97%   BMI 28.10 kg/m²     Physical Exam           Assessment & Plan     1. PAF (paroxysmal atrial fibrillation) ([facility])  EKG      2. S/P insertion of non-drug eluting coronary artery stent        3. Hypercoagulable state due to paroxysmal atrial fibrillation (HCC)        4. Diabetes mellitus with coincident hypertension (HCC)        5. Coronary artery disease due to lipid rich plaque        6. History of renal transplant        7. Polycystic kidney        8. Old myocardial infarction        9. Benign essential hypertension            Medical Decision Making: Today's Assessment/Status/Plan:   1.  Anticoagulation currently on Eliquis.  Patient interested in watchman.  Meets criteria.  Referral over for consideration.  Literature given.  2.  Atrial fibrillation paroxysmal minimally symptomatic.  3.  Old MI and stenting.  4.  Status post renal transplant  5.  Hypertension  6.  Diabetes mellitus.                     "

## 2023-10-26 NOTE — TELEPHONE ENCOUNTER
Received request via: Pharmacy    Was the patient seen in the last year in this department? Yes    Does the patient have an active prescription (recently filled or refills available) for medication(s) requested? No    Does the patient have FDC Plus and need 100 day supply (blood pressure, diabetes and cholesterol meds only)? Medication is not for cholesterol, blood pressure or diabetes  
3 = A little assistance

## 2023-10-30 ENCOUNTER — OFFICE VISIT (OUTPATIENT)
Dept: MEDICAL GROUP | Facility: PHYSICIAN GROUP | Age: 68
End: 2023-10-30
Payer: MEDICARE

## 2023-10-30 VITALS
OXYGEN SATURATION: 98 % | BODY MASS INDEX: 28.34 KG/M2 | WEIGHT: 240 LBS | TEMPERATURE: 97.7 F | DIASTOLIC BLOOD PRESSURE: 70 MMHG | SYSTOLIC BLOOD PRESSURE: 124 MMHG | HEART RATE: 72 BPM | RESPIRATION RATE: 18 BRPM | HEIGHT: 77 IN

## 2023-10-30 DIAGNOSIS — J01.00 ACUTE NON-RECURRENT MAXILLARY SINUSITIS: ICD-10-CM

## 2023-10-30 PROCEDURE — 3078F DIAST BP <80 MM HG: CPT | Performed by: FAMILY MEDICINE

## 2023-10-30 PROCEDURE — 3074F SYST BP LT 130 MM HG: CPT | Performed by: FAMILY MEDICINE

## 2023-10-30 PROCEDURE — 99213 OFFICE O/P EST LOW 20 MIN: CPT | Performed by: FAMILY MEDICINE

## 2023-10-30 RX ORDER — CEPHALEXIN 500 MG/1
500 CAPSULE ORAL 3 TIMES DAILY
Qty: 30 CAPSULE | Refills: 0 | Status: SHIPPED | OUTPATIENT
Start: 2023-10-30 | End: 2023-12-14

## 2023-10-30 ASSESSMENT — FIBROSIS 4 INDEX: FIB4 SCORE: 2.62

## 2023-10-30 NOTE — PROGRESS NOTES
Subjective:     CC: Here for ongoing sinus infection.    HPI:   Jama presents today with the following medical concerns:    Acute non-recurrent maxillary sinusitis  This is a persistent problem.  Patient was treated recently with Augmentin but the symptoms did not go away.  He still having sinus congestion with yellow-green drainage.  No fever or chills.    Past Medical History:   Diagnosis Date    Benign essential hypertension     Hyperlipoproteinemia     Hypertension     not on meds anymore    Pain     Polycystic kidney 9/10/10    RIGHT KIDNEY TRANSPLANT    Sleep apnea     Snoring        Social History     Tobacco Use    Smoking status: Never    Smokeless tobacco: Never   Vaping Use    Vaping Use: Never used   Substance Use Topics    Alcohol use: No    Drug use: No       Current Outpatient Medications Ordered in Epic   Medication Sig Dispense Refill    cephALEXin (KEFLEX) 500 MG Cap Take 1 Capsule by mouth 3 times a day. 30 Capsule 0    triamcinolone acetonide (KENALOG) 0.1 % Cream Apply a small amount to affected area twice daily. 45 g 3    pioglitazone (ACTOS) 45 MG Tab TAKE ONE TABLET BY MOUTH ONCE DAILY 100 Tablet 3    DULoxetine (CYMBALTA) 30 MG Cap DR Particles Take 1 Capsule by mouth every day. 30 Capsule 1    gabapentin (NEURONTIN) 300 MG Cap TAKE 2 CAPSULES BY MOUTH TWICE DAILY 360 Capsule 1    linagliptin (TRADJENTA) 5 MG Tab tablet Take 1 Tablet by mouth every day. 100 Tablet 3    ELIQUIS 5 MG Tab TAKE 1 TABLET BY MOUTH TWICE DAILY 180 Tablet 2    atorvastatin (LIPITOR) 80 MG tablet TAKE ONE TABLET BY MOUTH AT BEDTIME 100 Tablet 3    omeprazole (PRILOSEC) 20 MG delayed-release capsule Take 1 Capsule by mouth every day. 90 Capsule 3    mycophenolate (CELLCEPT) 500 MG tablet       glyBURIDE (DIABETA) 5 MG Tab Take 2 Tablets by mouth 2 times a day with meals. 400 Tablet 3    metoprolol SR (TOPROL XL) 25 MG TABLET SR 24 HR Take 1 Tablet by mouth every day. 90 Tablet 3    tadalafil (CIALIS) 5 MG tablet :  "TAKE 1 TABLETS 30 MINUTES BEFORE SEXUAL ACTIVITY, DO NOT EXCEED MORE THAN ONE DOSE A DAY 15 Tablet 3    predniSONE (DELTASONE) 5 MG Tab Take 1 Tablet by mouth every day. 30 Tablet 0    tacrolimus (PROGRAF) 1 MG Cap Take 1 Capsule by mouth 2 times a day. (Patient taking differently: Take 1 mg by mouth 2 times a day. 0800  2000) 60 Capsule 3     No current Epic-ordered facility-administered medications on file.       Allergies:  Doxycycline    Health Maintenance: Completed    ROS:  Gen: no fevers/chills, no changes in weight  Eyes: no changes in vision  ENT: no sore throat, no hearing loss, no bloody nose  Pulm: no sob, no cough  CV: no chest pain, no palpitations  GI: no nausea/vomiting, no diarrhea  : no dysuria  MSk: no myalgias  Skin: no rash  Neuro: no headaches, no numbness/tingling  Heme/Lymph: no easy bruising      Objective:       Exam:  /70 (BP Location: Left arm, Patient Position: Sitting, BP Cuff Size: Adult)   Pulse 72   Temp 36.5 °C (97.7 °F) (Temporal)   Resp 18   Ht 1.956 m (6' 5\")   Wt 109 kg (240 lb)   SpO2 98%   BMI 28.46 kg/m²  Body mass index is 28.46 kg/m².    Gen: Alert and oriented, No apparent distress.  ENT:    Ear canals and TMs are clear.  Nose has mild congestion with yellow-greenish drainage on both sides.  Throat has slight redness to the right posterior pharyngeal area without any exudate.  Neck: Neck is supple without lymphadenopathy.  Lungs: Normal effort, CTA bilaterally, no wheezes, rhonchi, or rales  Ext: No clubbing, cyanosis, edema.        Assessment & Plan:     67 y.o. male with the following -     1. Acute non-recurrent maxillary sinusitis  This is a persistent new problem.  We will change his antibiotic to Keflex 500 mg 3 times daily for 10 days.  Patient is to call if he does not improve on this.  Continue hydration.      Return if symptoms worsen or fail to improve.    Please note that this dictation was created using voice recognition software. I have made " every reasonable attempt to correct obvious errors, but I expect that there are errors of grammar and possibly content that I did not discover before finalizing the note.

## 2023-10-30 NOTE — ASSESSMENT & PLAN NOTE
This is a persistent problem.  Patient was treated recently with Augmentin but the symptoms did not go away.  He still having sinus congestion with yellow-green drainage.  No fever or chills.

## 2023-11-13 RX ORDER — TADALAFIL 5 MG/1
TABLET ORAL
Qty: 15 TABLET | Refills: 3 | Status: SHIPPED | OUTPATIENT
Start: 2023-11-13 | End: 2024-01-15 | Stop reason: SDUPTHER

## 2023-11-14 ENCOUNTER — OFFICE VISIT (OUTPATIENT)
Dept: MEDICAL GROUP | Facility: PHYSICIAN GROUP | Age: 68
End: 2023-11-14
Payer: MEDICARE

## 2023-11-14 ENCOUNTER — HOSPITAL ENCOUNTER (OUTPATIENT)
Dept: LAB | Facility: MEDICAL CENTER | Age: 68
End: 2023-11-14
Attending: INTERNAL MEDICINE
Payer: MEDICARE

## 2023-11-14 VITALS
HEIGHT: 77 IN | SYSTOLIC BLOOD PRESSURE: 126 MMHG | WEIGHT: 235 LBS | DIASTOLIC BLOOD PRESSURE: 72 MMHG | TEMPERATURE: 97.8 F | OXYGEN SATURATION: 96 % | BODY MASS INDEX: 27.75 KG/M2 | HEART RATE: 76 BPM | RESPIRATION RATE: 18 BRPM

## 2023-11-14 DIAGNOSIS — R22.32 AXILLARY MASS, LEFT: ICD-10-CM

## 2023-11-14 PROBLEM — J01.00 ACUTE NON-RECURRENT MAXILLARY SINUSITIS: Status: RESOLVED | Noted: 2023-02-09 | Resolved: 2023-11-14

## 2023-11-14 LAB
ALBUMIN SERPL BCP-MCNC: 4.5 G/DL (ref 3.2–4.9)
ALBUMIN/GLOB SERPL: 2 G/DL
ALP SERPL-CCNC: 75 U/L (ref 30–99)
ALT SERPL-CCNC: 16 U/L (ref 2–50)
ANION GAP SERPL CALC-SCNC: 10 MMOL/L (ref 7–16)
APPEARANCE UR: CLEAR
AST SERPL-CCNC: 19 U/L (ref 12–45)
BACTERIA #/AREA URNS HPF: NEGATIVE /HPF
BASOPHILS # BLD AUTO: 0.8 % (ref 0–1.8)
BASOPHILS # BLD: 0.05 K/UL (ref 0–0.12)
BILIRUB SERPL-MCNC: 0.4 MG/DL (ref 0.1–1.5)
BILIRUB UR QL STRIP.AUTO: NEGATIVE
BUN SERPL-MCNC: 50 MG/DL (ref 8–22)
CALCIUM ALBUM COR SERPL-MCNC: 8.9 MG/DL (ref 8.5–10.5)
CALCIUM SERPL-MCNC: 9.3 MG/DL (ref 8.5–10.5)
CHLORIDE SERPL-SCNC: 106 MMOL/L (ref 96–112)
CHOLEST SERPL-MCNC: 122 MG/DL (ref 100–199)
CO2 SERPL-SCNC: 24 MMOL/L (ref 20–33)
COLOR UR: YELLOW
CREAT SERPL-MCNC: 1.98 MG/DL (ref 0.5–1.4)
CREAT UR-MCNC: 90.14 MG/DL
CREAT UR-MCNC: 93.25 MG/DL
EOSINOPHIL # BLD AUTO: 0.07 K/UL (ref 0–0.51)
EOSINOPHIL NFR BLD: 1.1 % (ref 0–6.9)
EPI CELLS #/AREA URNS HPF: NEGATIVE /HPF
ERYTHROCYTE [DISTWIDTH] IN BLOOD BY AUTOMATED COUNT: 49.2 FL (ref 35.9–50)
EST. AVERAGE GLUCOSE BLD GHB EST-MCNC: 154 MG/DL
FASTING STATUS PATIENT QL REPORTED: NORMAL
FERRITIN SERPL-MCNC: 431 NG/ML (ref 22–322)
GFR SERPLBLD CREATININE-BSD FMLA CKD-EPI: 36 ML/MIN/1.73 M 2
GLOBULIN SER CALC-MCNC: 2.2 G/DL (ref 1.9–3.5)
GLUCOSE SERPL-MCNC: 88 MG/DL (ref 65–99)
GLUCOSE UR STRIP.AUTO-MCNC: NEGATIVE MG/DL
HBA1C MFR BLD: 7 % (ref 4–5.6)
HCT VFR BLD AUTO: 43.3 % (ref 42–52)
HDLC SERPL-MCNC: 55 MG/DL
HGB BLD-MCNC: 13.7 G/DL (ref 14–18)
HYALINE CASTS #/AREA URNS LPF: ABNORMAL /LPF
IMM GRANULOCYTES # BLD AUTO: 0.03 K/UL (ref 0–0.11)
IMM GRANULOCYTES NFR BLD AUTO: 0.5 % (ref 0–0.9)
IRON SATN MFR SERPL: 19 % (ref 15–55)
IRON SERPL-MCNC: 44 UG/DL (ref 50–180)
KETONES UR STRIP.AUTO-MCNC: NEGATIVE MG/DL
LDLC SERPL CALC-MCNC: 48 MG/DL
LEUKOCYTE ESTERASE UR QL STRIP.AUTO: ABNORMAL
LYMPHOCYTES # BLD AUTO: 0.95 K/UL (ref 1–4.8)
LYMPHOCYTES NFR BLD: 15 % (ref 22–41)
MAGNESIUM SERPL-MCNC: 1.9 MG/DL (ref 1.5–2.5)
MCH RBC QN AUTO: 28.8 PG (ref 27–33)
MCHC RBC AUTO-ENTMCNC: 31.6 G/DL (ref 32.3–36.5)
MCV RBC AUTO: 91.2 FL (ref 81.4–97.8)
MICRO URNS: ABNORMAL
MICROALBUMIN UR-MCNC: <1.2 MG/DL
MICROALBUMIN/CREAT UR: NORMAL MG/G (ref 0–30)
MONOCYTES # BLD AUTO: 0.73 K/UL (ref 0–0.85)
MONOCYTES NFR BLD AUTO: 11.5 % (ref 0–13.4)
NEUTROPHILS # BLD AUTO: 4.52 K/UL (ref 1.82–7.42)
NEUTROPHILS NFR BLD: 71.1 % (ref 44–72)
NITRITE UR QL STRIP.AUTO: NEGATIVE
NRBC # BLD AUTO: 0 K/UL
NRBC BLD-RTO: 0 /100 WBC (ref 0–0.2)
PH UR STRIP.AUTO: 6 [PH] (ref 5–8)
PHOSPHATE SERPL-MCNC: 3.3 MG/DL (ref 2.5–4.5)
PLATELET # BLD AUTO: 102 K/UL (ref 164–446)
PMV BLD AUTO: 11.7 FL (ref 9–12.9)
POTASSIUM SERPL-SCNC: 4.8 MMOL/L (ref 3.6–5.5)
PROT SERPL-MCNC: 6.7 G/DL (ref 6–8.2)
PROT UR QL STRIP: NEGATIVE MG/DL
PROT UR-MCNC: 5 MG/DL (ref 0–15)
PROT/CREAT UR: 54 MG/G (ref 15–68)
RBC # BLD AUTO: 4.75 M/UL (ref 4.7–6.1)
RBC # URNS HPF: ABNORMAL /HPF
RBC UR QL AUTO: NEGATIVE
SODIUM SERPL-SCNC: 140 MMOL/L (ref 135–145)
SP GR UR STRIP.AUTO: 1.02
TIBC SERPL-MCNC: 236 UG/DL (ref 250–450)
TRIGL SERPL-MCNC: 96 MG/DL (ref 0–149)
UIBC SERPL-MCNC: 192 UG/DL (ref 110–370)
URATE SERPL-MCNC: 7.4 MG/DL (ref 2.5–8.3)
UROBILINOGEN UR STRIP.AUTO-MCNC: 0.2 MG/DL
WBC # BLD AUTO: 6.4 K/UL (ref 4.8–10.8)
WBC #/AREA URNS HPF: ABNORMAL /HPF

## 2023-11-14 PROCEDURE — 3074F SYST BP LT 130 MM HG: CPT | Performed by: FAMILY MEDICINE

## 2023-11-14 PROCEDURE — 81001 URINALYSIS AUTO W/SCOPE: CPT

## 2023-11-14 PROCEDURE — 36415 COLL VENOUS BLD VENIPUNCTURE: CPT

## 2023-11-14 PROCEDURE — 85025 COMPLETE CBC W/AUTO DIFF WBC: CPT

## 2023-11-14 PROCEDURE — 83540 ASSAY OF IRON: CPT

## 2023-11-14 PROCEDURE — 80053 COMPREHEN METABOLIC PANEL: CPT

## 2023-11-14 PROCEDURE — 99213 OFFICE O/P EST LOW 20 MIN: CPT | Performed by: FAMILY MEDICINE

## 2023-11-14 PROCEDURE — 83036 HEMOGLOBIN GLYCOSYLATED A1C: CPT

## 2023-11-14 PROCEDURE — 82728 ASSAY OF FERRITIN: CPT

## 2023-11-14 PROCEDURE — 3078F DIAST BP <80 MM HG: CPT | Performed by: FAMILY MEDICINE

## 2023-11-14 PROCEDURE — 84550 ASSAY OF BLOOD/URIC ACID: CPT

## 2023-11-14 PROCEDURE — 80197 ASSAY OF TACROLIMUS: CPT

## 2023-11-14 PROCEDURE — 80061 LIPID PANEL: CPT

## 2023-11-14 PROCEDURE — 83550 IRON BINDING TEST: CPT

## 2023-11-14 PROCEDURE — 84100 ASSAY OF PHOSPHORUS: CPT

## 2023-11-14 PROCEDURE — 84156 ASSAY OF PROTEIN URINE: CPT

## 2023-11-14 PROCEDURE — 82043 UR ALBUMIN QUANTITATIVE: CPT

## 2023-11-14 PROCEDURE — 82570 ASSAY OF URINE CREATININE: CPT

## 2023-11-14 PROCEDURE — 83735 ASSAY OF MAGNESIUM: CPT

## 2023-11-14 ASSESSMENT — FIBROSIS 4 INDEX: FIB4 SCORE: 2.62

## 2023-11-14 NOTE — ASSESSMENT & PLAN NOTE
This is a new issue.  Since last year when he had a severe septic infection and required hospitalization he states he had swelling under his left axilla.  It is not painful but is starting get large enough and bothersome.  He like it investigated.

## 2023-11-14 NOTE — PROGRESS NOTES
Subjective:     CC: Here for swelling underneath the left axilla.    HPI:   Jama presents today with the following medical concerns:    Axillary mass, left  This is a new issue.  Since last year when he had a severe septic infection and required hospitalization he states he had swelling under his left axilla.  It is not painful but is starting get large enough and bothersome.  He like it investigated.    Past Medical History:   Diagnosis Date    Benign essential hypertension     Hyperlipoproteinemia     Hypertension     not on meds anymore    Pain     Polycystic kidney 9/10/10    RIGHT KIDNEY TRANSPLANT    Sleep apnea     Snoring        Social History     Tobacco Use    Smoking status: Never    Smokeless tobacco: Never   Vaping Use    Vaping Use: Never used   Substance Use Topics    Alcohol use: No    Drug use: No       Current Outpatient Medications Ordered in Epic   Medication Sig Dispense Refill    tadalafil (CIALIS) 5 MG tablet : TAKE 1 TABLETS 30 MINUTES BEFORE SEXUAL ACTIVITY, DO NOT EXCEED MORE THAN ONE DOSE A DAY 15 Tablet 3    cephALEXin (KEFLEX) 500 MG Cap Take 1 Capsule by mouth 3 times a day. 30 Capsule 0    triamcinolone acetonide (KENALOG) 0.1 % Cream Apply a small amount to affected area twice daily. 45 g 3    pioglitazone (ACTOS) 45 MG Tab TAKE ONE TABLET BY MOUTH ONCE DAILY 100 Tablet 3    DULoxetine (CYMBALTA) 30 MG Cap DR Particles Take 1 Capsule by mouth every day. 30 Capsule 1    gabapentin (NEURONTIN) 300 MG Cap TAKE 2 CAPSULES BY MOUTH TWICE DAILY 360 Capsule 1    linagliptin (TRADJENTA) 5 MG Tab tablet Take 1 Tablet by mouth every day. 100 Tablet 3    ELIQUIS 5 MG Tab TAKE 1 TABLET BY MOUTH TWICE DAILY 180 Tablet 2    atorvastatin (LIPITOR) 80 MG tablet TAKE ONE TABLET BY MOUTH AT BEDTIME 100 Tablet 3    omeprazole (PRILOSEC) 20 MG delayed-release capsule Take 1 Capsule by mouth every day. 90 Capsule 3    mycophenolate (CELLCEPT) 500 MG tablet       glyBURIDE (DIABETA) 5 MG Tab Take 2  "Tablets by mouth 2 times a day with meals. 400 Tablet 3    metoprolol SR (TOPROL XL) 25 MG TABLET SR 24 HR Take 1 Tablet by mouth every day. 90 Tablet 3    predniSONE (DELTASONE) 5 MG Tab Take 1 Tablet by mouth every day. 30 Tablet 0    tacrolimus (PROGRAF) 1 MG Cap Take 1 Capsule by mouth 2 times a day. (Patient taking differently: Take 1 mg by mouth 2 times a day. 0800  2000) 60 Capsule 3     No current [facility]-ordered facility-administered medications on file.       Allergies:  Doxycycline    Health Maintenance: Completed    ROS:  Gen: no fevers/chills, no changes in weight  Eyes: no changes in vision  ENT: no sore throat, no hearing loss, no bloody nose  Pulm: no sob, no cough  CV: no chest pain, no palpitations  GI: no nausea/vomiting, no diarrhea  : no dysuria  MSk: no myalgias  Skin: no rash  Neuro: no headaches, no numbness/tingling  Heme/Lymph: no easy bruising      Objective:       Exam:  /72 (BP Location: Right arm, Patient Position: Sitting, BP Cuff Size: Adult)   Pulse 76   Temp 36.6 °C (97.8 °F) (Temporal)   Resp 18   Ht 1.956 m (6' 5\")   Wt 107 kg (235 lb)   SpO2 96%   BMI 27.87 kg/m²  Body mass index is 27.87 kg/m².    Gen: Alert and oriented, No apparent distress.  Ext: No clubbing, cyanosis, edema.  Patient has a large smooth subcutaneous mass under the left axilla up under the pectoralis muscle.  It is nontender to palpation.  It feels mobile.      Labs: Reviewed    Assessment & Plan:     67 y.o. male with the following -     1. Axillary mass, left  This is a new issue.  We will for start with an ultrasound and see what investigation or referral needs to be done after that.  - US-EXTREMITY NON VASCULAR BILATERAL; Future      Return if symptoms worsen or fail to improve.    Please note that this dictation was created using voice recognition software. I have made every reasonable attempt to correct obvious errors, but I expect that there are errors of grammar and possibly content that I " did not discover before finalizing the note.

## 2023-11-15 LAB — TACROLIMUS BLD-MCNC: 5.3 NG/ML (ref 5–20)

## 2023-11-20 ENCOUNTER — APPOINTMENT (OUTPATIENT)
Dept: RADIOLOGY | Facility: MEDICAL CENTER | Age: 68
End: 2023-11-20
Attending: FAMILY MEDICINE
Payer: MEDICARE

## 2023-11-20 DIAGNOSIS — R22.32 AXILLARY MASS, LEFT: ICD-10-CM

## 2023-11-20 PROCEDURE — 76881 US COMPL JOINT R-T W/IMG: CPT

## 2023-11-21 ENCOUNTER — TELEPHONE (OUTPATIENT)
Dept: MEDICAL GROUP | Facility: PHYSICIAN GROUP | Age: 68
End: 2023-11-21
Payer: MEDICARE

## 2023-11-21 DIAGNOSIS — R22.32 AXILLARY MASS, LEFT: ICD-10-CM

## 2023-11-22 ENCOUNTER — APPOINTMENT (OUTPATIENT)
Dept: CARDIOLOGY | Facility: MEDICAL CENTER | Age: 68
End: 2023-11-22
Attending: INTERNAL MEDICINE
Payer: MEDICARE

## 2023-11-30 ENCOUNTER — OFFICE VISIT (OUTPATIENT)
Dept: CARDIOLOGY | Facility: MEDICAL CENTER | Age: 68
End: 2023-11-30
Attending: INTERNAL MEDICINE
Payer: MEDICARE

## 2023-11-30 ENCOUNTER — TELEPHONE (OUTPATIENT)
Dept: CARDIOLOGY | Facility: MEDICAL CENTER | Age: 68
End: 2023-11-30

## 2023-11-30 VITALS
OXYGEN SATURATION: 97 % | RESPIRATION RATE: 14 BRPM | BODY MASS INDEX: 28.46 KG/M2 | WEIGHT: 241 LBS | HEART RATE: 51 BPM | DIASTOLIC BLOOD PRESSURE: 72 MMHG | SYSTOLIC BLOOD PRESSURE: 110 MMHG | HEIGHT: 77 IN

## 2023-11-30 DIAGNOSIS — I48.0 PAF (PAROXYSMAL ATRIAL FIBRILLATION) (HCC): ICD-10-CM

## 2023-11-30 PROCEDURE — 93010 ELECTROCARDIOGRAM REPORT: CPT | Performed by: INTERNAL MEDICINE

## 2023-11-30 PROCEDURE — 99213 OFFICE O/P EST LOW 20 MIN: CPT | Performed by: INTERNAL MEDICINE

## 2023-11-30 PROCEDURE — 93005 ELECTROCARDIOGRAM TRACING: CPT | Performed by: INTERNAL MEDICINE

## 2023-11-30 PROCEDURE — 3078F DIAST BP <80 MM HG: CPT | Performed by: INTERNAL MEDICINE

## 2023-11-30 PROCEDURE — 99215 OFFICE O/P EST HI 40 MIN: CPT | Mod: 25 | Performed by: INTERNAL MEDICINE

## 2023-11-30 PROCEDURE — 3074F SYST BP LT 130 MM HG: CPT | Performed by: INTERNAL MEDICINE

## 2023-11-30 ASSESSMENT — FIBROSIS 4 INDEX: FIB4 SCORE: 3.12

## 2023-11-30 NOTE — PROGRESS NOTES
Arrhythmia Clinic Note (Established patient)    DOS: 11/30/2023    Chief complaint/Reason for consult: Afib    Interval History: Seen by Dr Middleton. pAF on Eliquis, complains of painful bruising, referred for KEITH RODRIGUEZ (+ highlighted in bold):  Constitutional: Fevers/chills/fatigue/weightloss  HEENT: Blurry vision/eye pain/sore throat/hearing loss  Respiratory: Shortness of breath/cough  Cardiovascular: Chest pain/palpitations/edema/orthopnea/syncope  GI: Nausea/vomitting/diarrhea  MSK: Arthralgias/myagias/muscle weakness  Skin: Rash/sores  Neurological: Numbness/tremors/vertigo  Endocrine: Excessive thirst/polyuria/cold intolerance/heat intolerance  Psych: Depression/anxiety    Past Medical History:   Diagnosis Date    Benign essential hypertension     Hyperlipoproteinemia     Hypertension     not on meds anymore    Pain     Polycystic kidney 9/10/10    RIGHT KIDNEY TRANSPLANT    Sleep apnea     Snoring        Past Surgical History:   Procedure Laterality Date    KNEE MANIPULATION  2/16/2012    Performed by LATOYA CONNER at SURGERY [facility] ORS    KNEE UNICOMPARTMENTAL  12/23/2011    Performed by LATOYA CONNER at SURGERY [facility] ORS    KNEE ARTHROSCOPY  12/23/2011    Performed by LATOYA CONNER at SURGERY [facility] ORS    KNEE ARTHROSCOPY  5/3/2011    Performed by HANANE GOLDMAN at SURGERY SAME DAY [facility] ORS    MENISCECTOMY, KNEE, MEDIAL  5/3/2011    Performed by HANANE GOLDMAN at SURGERY SAME DAY [facility] ORS    OTHER  9/10/10    RIGHT KIDNEY TRANSPLANT    KNEE ARTHROPLASTY TOTAL  1/12/07    RIGHT    KNEE ARTHROSCOPY  4/10/06    RIGHT    OTHER ORTHOPEDIC SURGERY  7/8/74    LEFT KNEE DEBRIDEMENT       Social History     Socioeconomic History    Marital status:      Spouse name: Not on file    Number of children: Not on file    Years of education: Not on file    Highest education level: Not on file   Occupational History    Not on file   Tobacco Use    Smoking status: Never    Smokeless  tobacco: Never   Vaping Use    Vaping Use: Never used   Substance and Sexual Activity    Alcohol use: No    Drug use: No    Sexual activity: Yes     Partners: Female   Other Topics Concern    Not on file   Social History Narrative    Not on file     Social Determinants of Health     Financial Resource Strain: Not on file   Food Insecurity: Not on file   Transportation Needs: Not on file   Physical Activity: Not on file   Stress: Not on file   Social Connections: Not on file   Intimate Partner Violence: Not on file   Housing Stability: Not on file       History reviewed. No pertinent family history.    Allergies   Allergen Reactions    Doxycycline Rash     Sweats and shakes: 9/28/17: Clarified allergy with patient. Allergy was in 1998 and he doesn't remember what happened. He thought the medication is for pain.  Tolerates doxycycline 9/2017       Current Outpatient Medications   Medication Sig Dispense Refill    tadalafil (CIALIS) 5 MG tablet : TAKE 1 TABLETS 30 MINUTES BEFORE SEXUAL ACTIVITY, DO NOT EXCEED MORE THAN ONE DOSE A DAY 15 Tablet 3    pioglitazone (ACTOS) 45 MG Tab TAKE ONE TABLET BY MOUTH ONCE DAILY 100 Tablet 3    gabapentin (NEURONTIN) 300 MG Cap TAKE 2 CAPSULES BY MOUTH TWICE DAILY 360 Capsule 1    linagliptin (TRADJENTA) 5 MG Tab tablet Take 1 Tablet by mouth every day. 100 Tablet 3    ELIQUIS 5 MG Tab TAKE 1 TABLET BY MOUTH TWICE DAILY 180 Tablet 2    atorvastatin (LIPITOR) 80 MG tablet TAKE ONE TABLET BY MOUTH AT BEDTIME 100 Tablet 3    omeprazole (PRILOSEC) 20 MG delayed-release capsule Take 1 Capsule by mouth every day. 90 Capsule 3    mycophenolate (CELLCEPT) 500 MG tablet       metoprolol SR (TOPROL XL) 25 MG TABLET SR 24 HR Take 1 Tablet by mouth every day. 90 Tablet 3    predniSONE (DELTASONE) 5 MG Tab Take 1 Tablet by mouth every day. 30 Tablet 0    tacrolimus (PROGRAF) 1 MG Cap Take 1 Capsule by mouth 2 times a day. (Patient taking differently: Take 1 mg by mouth 2 times a day. 0800  2000)  "60 Capsule 3    cephALEXin (KEFLEX) 500 MG Cap Take 1 Capsule by mouth 3 times a day. (Patient not taking: Reported on 11/30/2023) 30 Capsule 0    triamcinolone acetonide (KENALOG) 0.1 % Cream Apply a small amount to affected area twice daily. (Patient not taking: Reported on 11/30/2023) 45 g 3    DULoxetine (CYMBALTA) 30 MG Cap DR Particles Take 1 Capsule by mouth every day. (Patient not taking: Reported on 11/30/2023) 30 Capsule 1    glyBURIDE (DIABETA) 5 MG Tab Take 2 Tablets by mouth 2 times a day with meals. (Patient not taking: Reported on 11/30/2023) 400 Tablet 3     No current facility-administered medications for this visit.       Physical Exam:  Vitals:    11/30/23 1328   BP: 110/72   BP Location: Left arm   Patient Position: Sitting   BP Cuff Size: Adult   Pulse: (!) 51   Resp: 14   SpO2: 97%   Weight: 109 kg (241 lb)   Height: 1.956 m (6' 5\")     General appearance: NAD, conversant   Eyes: anicteric sclerae, moist conjunctivae; no lid-lag; PERRLA  HENT: Atraumatic; oropharynx clear with moist mucous membranes and no mucosal ulcerations; normal hard and soft palate  Neck: Trachea midline; FROM, supple, no thyromegaly or lymphadenopathy  Lungs: CTA, with normal respiratory effort and no intercostal retractions  CV: RRR, no MRGs, no JVD  Abdomen: Soft, non-tender; no masses or HSM  Extremities: No peripheral edema or extremity lymphadenopathy  Skin: Normal temperature, turgor and texture; no rash, ulcers or subcutaneous nodules  Psych: Appropriate affect, alert and oriented to person, place and time    Data:  Lipids:   Lab Results   Component Value Date/Time    CHOLSTRLTOT 122 11/14/2023 08:43 AM    TRIGLYCERIDE 96 11/14/2023 08:43 AM    HDL 55 11/14/2023 08:43 AM    LDL 48 11/14/2023 08:43 AM        BMP:  Lab Results   Component Value Date/Time    SODIUM 140 11/14/2023 0843    POTASSIUM 4.8 11/14/2023 0843    CHLORIDE 106 11/14/2023 0843    CO2 24 11/14/2023 0843    GLUCOSE 88 11/14/2023 0843    BUN 50 (H) " "11/14/2023 0843    CREATININE 1.98 (H) 11/14/2023 0843    CALCIUM 9.3 11/14/2023 0843    ANION 10.0 11/14/2023 0843        TSH:   Lab Results   Component Value Date/Time    TSHULTRASEN 2.260 12/08/2020 0741        THYROXINE (T4):   No results found for: \"FREEDIR\"     CBC:   Lab Results   Component Value Date/Time    WBC 6.4 11/14/2023 08:43 AM    RBC 4.75 11/14/2023 08:43 AM    HEMOGLOBIN 13.7 (L) 11/14/2023 08:43 AM    HEMATOCRIT 43.3 11/14/2023 08:43 AM    MCV 91.2 11/14/2023 08:43 AM    MCH 28.8 11/14/2023 08:43 AM    MCHC 31.6 (L) 11/14/2023 08:43 AM    RDW 49.2 11/14/2023 08:43 AM    PLATELETCT 102 (L) 11/14/2023 08:43 AM    MPV 11.7 11/14/2023 08:43 AM    NEUTSPOLYS 71.10 11/14/2023 08:43 AM    LYMPHOCYTES 15.00 (L) 11/14/2023 08:43 AM    MONOCYTES 11.50 11/14/2023 08:43 AM    EOSINOPHILS 1.10 11/14/2023 08:43 AM    BASOPHILS 0.80 11/14/2023 08:43 AM    IMMGRAN 0.50 11/14/2023 08:43 AM    NRBC 0.00 11/14/2023 08:43 AM    NEUTS 4.52 11/14/2023 08:43 AM    LYMPHS 0.95 (L) 11/14/2023 08:43 AM    LYMPHS 6 05/28/2022 10:00 AM    MONOS 0.73 11/14/2023 08:43 AM    EOS 0.07 11/14/2023 08:43 AM    BASO 0.05 11/14/2023 08:43 AM    IMMGRANAB 0.03 11/14/2023 08:43 AM    NRBCAB 0.00 11/14/2023 08:43 AM        CBC w/o DIFF  Lab Results   Component Value Date/Time    WBC 6.4 11/14/2023 08:43 AM    RBC 4.75 11/14/2023 08:43 AM    HEMOGLOBIN 13.7 (L) 11/14/2023 08:43 AM    MCV 91.2 11/14/2023 08:43 AM    MCH 28.8 11/14/2023 08:43 AM    MCHC 31.6 (L) 11/14/2023 08:43 AM    RDW 49.2 11/14/2023 08:43 AM    MPV 11.7 11/14/2023 08:43 AM       Prior echo/stress reviewed: EF 45%    Prior cath reviewed: Prior PCI    EKG interpreted by me: SR with bifascicular block and PACs    Impression/Plan:  1. PAF (paroxysmal atrial fibrillation) (HCC)  EKG        pAF  Hypercoagulable state due to afib    - Pt interested in PIPE-C as alternative to OAC. [id number] 5 for age, htn, dm, chf, and CAD. Risks include vascular access bleeding or pain, " infection, stroke, myocardial infarction, cardiac tamponade, pericardial effusion, myocardial perforation, major bleeding, and death. Risk of major adverse event is ~1%.     Plan JOSE Simpson MD  Cardiac Electrophysiology

## 2023-11-30 NOTE — TELEPHONE ENCOUNTER
----- Message from Mark Simpson M.D. sent at 11/30/2023  2:08 PM PST -----  Please schedule Watchman next available no meds to hold thanks

## 2023-11-30 NOTE — TELEPHONE ENCOUNTER
Emailed Dr. Marques RoseOrders, Dr. Mohan referral and post watchman RADHA orders to Leana MONTENEGRO to have Dr. Simpson sign.No device.

## 2023-12-04 NOTE — TELEPHONE ENCOUNTER
Patient is scheduled on 2-29-24 for a post watchman RADHA w/anesthesia with Dr. Mosqueda. Patient to check in at 9:00 for an 11:00 procedure. Updated H&P to be done on admit by NP. Pre admit to call patient.

## 2023-12-04 NOTE — TELEPHONE ENCOUNTER
Patient is scheduled on 1-9-24 for a Watchman w/RADHA w/anesthesia with Dr. Simpson. Patient was told to hold cialis 72hrs prior, hold actos,tradjenta and glyburide AM day of procedure. Patient to check in at 10:30 for a 12:30 procedure. Updated H&P to be done on admit by NP. Pre admit to call patient. No device.

## 2023-12-05 ENCOUNTER — APPOINTMENT (OUTPATIENT)
Dept: ADMISSIONS | Facility: MEDICAL CENTER | Age: 68
DRG: 274 | End: 2023-12-05
Attending: INTERNAL MEDICINE
Payer: MEDICARE

## 2023-12-07 NOTE — DISCHARGE PLANNING
"TCN following. [facility] chart review completed.   Nephrology and Infectious Diseases Medicine following mbr. Per ID-- \" Continue IV meropenem 500 mg every 6 hours while in the hospital  Can use once daily ertapenem as outpatient  Stop date IV antibiotics 8/8/2022 then follow with oral ciprofloxacin for 2 weeks ..\"    [facility] FTF placed in Epic by Dr Cat on  7/29/2022- referral sent 7/29 awaiting HH acceptance.    Mbr is not medically cleared for DC with anticipated DC to home with possible Home Health services.       Previously completed:  - Choice forms: (HH, IV Infusion).   - [facility] introduced (Y), referral (sent).   " Patient had an echo on Monday. She does not understand all of it. She would like Dr. Johnston to explain and let her know what to do next.    Medina Beckford on 12/7/2023 at 10:16 AM

## 2023-12-14 ENCOUNTER — PRE-ADMISSION TESTING (OUTPATIENT)
Dept: ADMISSIONS | Facility: MEDICAL CENTER | Age: 68
DRG: 274 | End: 2023-12-14
Attending: INTERNAL MEDICINE
Payer: MEDICARE

## 2023-12-21 LAB — EKG IMPRESSION: NORMAL

## 2023-12-26 ENCOUNTER — OFFICE VISIT (OUTPATIENT)
Dept: MEDICAL GROUP | Facility: PHYSICIAN GROUP | Age: 68
End: 2023-12-26
Payer: MEDICARE

## 2023-12-26 VITALS
BODY MASS INDEX: 27.87 KG/M2 | TEMPERATURE: 98 F | OXYGEN SATURATION: 97 % | HEIGHT: 77 IN | WEIGHT: 236 LBS | SYSTOLIC BLOOD PRESSURE: 116 MMHG | HEART RATE: 70 BPM | RESPIRATION RATE: 18 BRPM | DIASTOLIC BLOOD PRESSURE: 64 MMHG

## 2023-12-26 DIAGNOSIS — J01.01 ACUTE RECURRENT MAXILLARY SINUSITIS: ICD-10-CM

## 2023-12-26 DIAGNOSIS — E11.40 TYPE 2 DIABETES MELLITUS WITH DIABETIC NEUROPATHY, UNSPECIFIED (HCC): ICD-10-CM

## 2023-12-26 PROCEDURE — 99213 OFFICE O/P EST LOW 20 MIN: CPT | Performed by: FAMILY MEDICINE

## 2023-12-26 PROCEDURE — 3078F DIAST BP <80 MM HG: CPT | Performed by: FAMILY MEDICINE

## 2023-12-26 PROCEDURE — 3074F SYST BP LT 130 MM HG: CPT | Performed by: FAMILY MEDICINE

## 2023-12-26 RX ORDER — AMOXICILLIN AND CLAVULANATE POTASSIUM 875; 125 MG/1; MG/1
1 TABLET, FILM COATED ORAL 2 TIMES DAILY
Qty: 20 TABLET | Refills: 0 | Status: SHIPPED | OUTPATIENT
Start: 2023-12-26 | End: 2024-02-16

## 2023-12-26 ASSESSMENT — FIBROSIS 4 INDEX: FIB4 SCORE: 3.12

## 2023-12-26 NOTE — ASSESSMENT & PLAN NOTE
This is an acute recurrent problem.  Patient is having troubles with sinus congestion and yellow drainage over the last week.  He feels like it is moving down into his chest and he wants it taken care of as he has several procedures coming up.  No fever or chills.  No cough although he feels a little discomfort in the center of his chest with breathing.

## 2023-12-26 NOTE — PROGRESS NOTES
Subjective:     CC: Here for upper respiratory infection.    HPI:   Jama presents today with the following medical concerns:    Acute recurrent maxillary sinusitis  This is an acute recurrent problem.  Patient is having troubles with sinus congestion and yellow drainage over the last week.  He feels like it is moving down into his chest and he wants it taken care of as he has several procedures coming up.  No fever or chills.  No cough although he feels a little discomfort in the center of his chest with breathing.    Past Medical History:   Diagnosis Date    Benign essential hypertension     Diabetes (HCC)     type 2    Heart burn     Hyperlipoproteinemia     Hypertension     not on meds anymore    Myocardial infarct (HCC) 2013    stent    Pain     Polycystic kidney 09/10/2010    RIGHT KIDNEY TRANSPLANT    Sleep apnea     Snoring        Social History     Tobacco Use    Smoking status: Never    Smokeless tobacco: Never   Vaping Use    Vaping Use: Never used   Substance Use Topics    Alcohol use: No    Drug use: No       Current Outpatient Medications Ordered in Epic   Medication Sig Dispense Refill    amoxicillin-clavulanate (AUGMENTIN) 875-125 MG Tab Take 1 Tablet by mouth 2 times a day. 20 Tablet 0    tadalafil (CIALIS) 5 MG tablet : TAKE 1 TABLETS 30 MINUTES BEFORE SEXUAL ACTIVITY, DO NOT EXCEED MORE THAN ONE DOSE A DAY 15 Tablet 3    pioglitazone (ACTOS) 45 MG Tab TAKE ONE TABLET BY MOUTH ONCE DAILY 100 Tablet 3    gabapentin (NEURONTIN) 300 MG Cap TAKE 2 CAPSULES BY MOUTH TWICE DAILY 360 Capsule 1    linagliptin (TRADJENTA) 5 MG Tab tablet Take 1 Tablet by mouth every day. 100 Tablet 3    ELIQUIS 5 MG Tab TAKE 1 TABLET BY MOUTH TWICE DAILY 180 Tablet 2    omeprazole (PRILOSEC) 20 MG delayed-release capsule Take 1 Capsule by mouth every day. 90 Capsule 3    mycophenolate (CELLCEPT) 500 MG tablet Take 500 mg by mouth 2 times a day.      glyBURIDE (DIABETA) 5 MG Tab Take 2 Tablets by mouth 2 times a day with  "meals. 400 Tablet 3    metoprolol SR (TOPROL XL) 25 MG TABLET SR 24 HR Take 1 Tablet by mouth every day. 90 Tablet 3    predniSONE (DELTASONE) 5 MG Tab Take 1 Tablet by mouth every day. 30 Tablet 0    tacrolimus (PROGRAF) 1 MG Cap Take 1 Capsule by mouth 2 times a day. (Patient taking differently: Take 1 mg by mouth 2 times a day. 0800  2000) 60 Capsule 3     No current Epic-ordered facility-administered medications on file.       Allergies:  Doxycycline    Health Maintenance: Completed    ROS:  Gen: no fevers/chills, no changes in weight  Eyes: no changes in vision  ENT: Mild sore throat, no hearing loss, no bloody nose  Pulm: no sob, no cough  CV: no chest pain, no palpitations  GI: no nausea/vomiting, no diarrhea  : no dysuria  MSk: no myalgias  Skin: no rash  Neuro: no headaches, no numbness/tingling  Heme/Lymph: no easy bruising      Objective:       Exam:  /64 (BP Location: Left arm, Patient Position: Sitting, BP Cuff Size: Adult)   Pulse 70   Temp 36.7 °C (98 °F) (Temporal)   Resp 18   Ht 1.956 m (6' 5\")   Wt 107 kg (236 lb)   SpO2 97%   BMI 27.99 kg/m²  Body mass index is 27.99 kg/m².    Gen: Alert and oriented, No apparent distress.  ENT:    Ear canals and TMs are clear.  Nose has mild congestion with yellowish drainage.  Mild pressure over the maxillary sinuses.  There is mild redness to the right posterior pharyngeal area without any exudate.  Neck: Neck is supple without lymphadenopathy.  Lungs: Normal effort, CTA bilaterally, no wheezes, rhonchi, or rales  Ext: No clubbing, cyanosis, edema.          Assessment & Plan:     68 y.o. male with the following -     1. Type 2 diabetes mellitus with diabetic neuropathy, unspecified (HCC)  The retinal exam test was attempted but not successful due to equipment failure.  - POCT Retinal Eye Exam    2. Acute recurrent maxillary sinusitis  This is an acute recurrent problem.  Patient be treated with Augmentin for 10 days.  Antihistamines as " needed.      Return if symptoms worsen or fail to improve.    Please note that this dictation was created using voice recognition software. I have made every reasonable attempt to correct obvious errors, but I expect that there are errors of grammar and possibly content that I did not discover before finalizing the note.

## 2024-01-01 ENCOUNTER — APPOINTMENT (OUTPATIENT)
Dept: RADIOLOGY | Facility: MEDICAL CENTER | Age: 69
DRG: 698 | End: 2024-01-01
Attending: HOSPITALIST
Payer: MEDICARE

## 2024-01-01 ENCOUNTER — PHYSICAL THERAPY (OUTPATIENT)
Dept: PHYSICAL THERAPY | Facility: REHABILITATION | Age: 69
End: 2024-01-01
Attending: STUDENT IN AN ORGANIZED HEALTH CARE EDUCATION/TRAINING PROGRAM
Payer: MEDICARE

## 2024-01-01 ENCOUNTER — APPOINTMENT (OUTPATIENT)
Dept: RADIOLOGY | Facility: MEDICAL CENTER | Age: 69
DRG: 698 | End: 2024-01-01
Attending: INTERNAL MEDICINE
Payer: MEDICARE

## 2024-01-01 ENCOUNTER — APPOINTMENT (OUTPATIENT)
Dept: RADIOLOGY | Facility: MEDICAL CENTER | Age: 69
DRG: 698 | End: 2024-01-01
Attending: EMERGENCY MEDICINE
Payer: MEDICARE

## 2024-01-01 ENCOUNTER — APPOINTMENT (OUTPATIENT)
Dept: RADIOLOGY | Facility: MEDICAL CENTER | Age: 69
DRG: 698 | End: 2024-01-01
Attending: STUDENT IN AN ORGANIZED HEALTH CARE EDUCATION/TRAINING PROGRAM
Payer: MEDICARE

## 2024-01-01 DIAGNOSIS — I89.0 LYMPHEDEMA OF LEFT ARM: ICD-10-CM

## 2024-01-01 PROCEDURE — 71045 X-RAY EXAM CHEST 1 VIEW: CPT

## 2024-01-01 PROCEDURE — 32554 ASPIRATE PLEURA W/O IMAGING: CPT

## 2024-01-01 PROCEDURE — 999999 HB NO CHARGE

## 2024-01-03 DIAGNOSIS — I25.2 HISTORY OF MI (MYOCARDIAL INFARCTION): ICD-10-CM

## 2024-01-03 RX ORDER — METOPROLOL SUCCINATE 25 MG/1
25 TABLET, EXTENDED RELEASE ORAL DAILY
Qty: 90 TABLET | Refills: 3 | Status: SHIPPED | OUTPATIENT
Start: 2024-01-03 | End: 2024-03-06

## 2024-01-03 NOTE — TELEPHONE ENCOUNTER
Is the patient due for a refill? Yes    Was the patient seen the past year? Yes    Date of last office visit: 11/30/2023    Does the patient have an upcoming appointment?  No   If yes, When?     Provider to refill:    Does the patients insurance require a 100 day supply?  Yes

## 2024-01-05 ENCOUNTER — PRE-ADMISSION TESTING (OUTPATIENT)
Dept: ADMISSIONS | Facility: MEDICAL CENTER | Age: 69
DRG: 274 | End: 2024-01-05
Attending: INTERNAL MEDICINE
Payer: MEDICARE

## 2024-01-05 DIAGNOSIS — Z01.812 PRE-PROCEDURAL LABORATORY EXAMINATION: ICD-10-CM

## 2024-01-05 DIAGNOSIS — Z01.810 PREOPERATIVE CARDIOVASCULAR EXAMINATION: ICD-10-CM

## 2024-01-05 LAB
ANION GAP SERPL CALC-SCNC: 10 MMOL/L (ref 7–16)
APTT PPP: 28.6 SEC (ref 24.7–36)
BASOPHILS # BLD AUTO: 1 % (ref 0–1.8)
BASOPHILS # BLD: 0.06 K/UL (ref 0–0.12)
BUN SERPL-MCNC: 38 MG/DL (ref 8–22)
CALCIUM SERPL-MCNC: 9.4 MG/DL (ref 8.5–10.5)
CHLORIDE SERPL-SCNC: 105 MMOL/L (ref 96–112)
CO2 SERPL-SCNC: 26 MMOL/L (ref 20–33)
CREAT SERPL-MCNC: 1.76 MG/DL (ref 0.5–1.4)
EKG IMPRESSION: NORMAL
EOSINOPHIL # BLD AUTO: 0.07 K/UL (ref 0–0.51)
EOSINOPHIL NFR BLD: 1.1 % (ref 0–6.9)
ERYTHROCYTE [DISTWIDTH] IN BLOOD BY AUTOMATED COUNT: 48.3 FL (ref 35.9–50)
GFR SERPLBLD CREATININE-BSD FMLA CKD-EPI: 42 ML/MIN/1.73 M 2
GLUCOSE SERPL-MCNC: 109 MG/DL (ref 65–99)
HCT VFR BLD AUTO: 47.7 % (ref 42–52)
HGB BLD-MCNC: 14.6 G/DL (ref 14–18)
IMM GRANULOCYTES # BLD AUTO: 0.06 K/UL (ref 0–0.11)
IMM GRANULOCYTES NFR BLD AUTO: 1 % (ref 0–0.9)
INR PPP: 1.16 (ref 0.87–1.13)
LYMPHOCYTES # BLD AUTO: 1.08 K/UL (ref 1–4.8)
LYMPHOCYTES NFR BLD: 17.6 % (ref 22–41)
MCH RBC QN AUTO: 28.5 PG (ref 27–33)
MCHC RBC AUTO-ENTMCNC: 30.6 G/DL (ref 32.3–36.5)
MCV RBC AUTO: 93 FL (ref 81.4–97.8)
MONOCYTES # BLD AUTO: 0.72 K/UL (ref 0–0.85)
MONOCYTES NFR BLD AUTO: 11.7 % (ref 0–13.4)
NEUTROPHILS # BLD AUTO: 4.15 K/UL (ref 1.82–7.42)
NEUTROPHILS NFR BLD: 67.6 % (ref 44–72)
NRBC # BLD AUTO: 0 K/UL
NRBC BLD-RTO: 0 /100 WBC (ref 0–0.2)
PLATELET # BLD AUTO: 116 K/UL (ref 164–446)
PMV BLD AUTO: 12.4 FL (ref 9–12.9)
POTASSIUM SERPL-SCNC: 5.1 MMOL/L (ref 3.6–5.5)
PROTHROMBIN TIME: 14.9 SEC (ref 12–14.6)
RBC # BLD AUTO: 5.13 M/UL (ref 4.7–6.1)
SODIUM SERPL-SCNC: 141 MMOL/L (ref 135–145)
WBC # BLD AUTO: 6.1 K/UL (ref 4.8–10.8)

## 2024-01-05 PROCEDURE — 80048 BASIC METABOLIC PNL TOTAL CA: CPT

## 2024-01-05 PROCEDURE — 93010 ELECTROCARDIOGRAM REPORT: CPT | Performed by: STUDENT IN AN ORGANIZED HEALTH CARE EDUCATION/TRAINING PROGRAM

## 2024-01-05 PROCEDURE — 85025 COMPLETE CBC W/AUTO DIFF WBC: CPT

## 2024-01-05 PROCEDURE — 93005 ELECTROCARDIOGRAM TRACING: CPT

## 2024-01-05 PROCEDURE — 85730 THROMBOPLASTIN TIME PARTIAL: CPT

## 2024-01-05 PROCEDURE — 36415 COLL VENOUS BLD VENIPUNCTURE: CPT

## 2024-01-05 PROCEDURE — 85610 PROTHROMBIN TIME: CPT

## 2024-01-08 ENCOUNTER — ANTICOAGULATION VISIT (OUTPATIENT)
Dept: MEDICAL GROUP | Facility: PHYSICIAN GROUP | Age: 69
End: 2024-01-08
Payer: MEDICARE

## 2024-01-08 VITALS
SYSTOLIC BLOOD PRESSURE: 137 MMHG | OXYGEN SATURATION: 97 % | RESPIRATION RATE: 16 BRPM | HEART RATE: 60 BPM | DIASTOLIC BLOOD PRESSURE: 92 MMHG

## 2024-01-08 DIAGNOSIS — Z86.79 HISTORY OF ATRIAL FIBRILLATION: ICD-10-CM

## 2024-01-08 DIAGNOSIS — I82.622 ACUTE DEEP VEIN THROMBOSIS (DVT) OF LEFT UPPER EXTREMITY, UNSPECIFIED VEIN (HCC): ICD-10-CM

## 2024-01-08 DIAGNOSIS — Z79.01 LONG TERM (CURRENT) USE OF ANTICOAGULANTS: Primary | ICD-10-CM

## 2024-01-08 PROCEDURE — 3075F SYST BP GE 130 - 139MM HG: CPT | Performed by: INTERNAL MEDICINE

## 2024-01-08 PROCEDURE — 3080F DIAST BP >= 90 MM HG: CPT | Performed by: INTERNAL MEDICINE

## 2024-01-08 PROCEDURE — 99211 OFF/OP EST MAY X REQ PHY/QHP: CPT | Performed by: INTERNAL MEDICINE

## 2024-01-08 NOTE — PROGRESS NOTES
Anticoagulation Summary  As of 1/8/2024      INR goal:     TTR:  36.4 % (1.4 y)   Prior goal:  2.0-3.0   INR used for dosing:  No new INR was available at the time of this encounter.   Warfarin maintenance plan:  No maintenance plan   Plan last modified:  Nelida Flores, PharmD (10/10/2022)   Next INR check:  2/29/2024   Target end date:  Indefinite    Indications    History of atrial fibrillation [Z86.79]  Acute deep vein thrombosis (DVT) of left upper extremity (HCC) [I82.622]  Long term (current) use of anticoagulants [Z79.01]                 Anticoagulation Episode Summary       INR check location:      Preferred lab:      Send INR reminders to:      Comments:  Switch back to Eliquis for AF if DVT is resolved after 3 months (around 10/25/22)          Anticoagulation Care Providers       Provider Role Specialty Phone number    [facility] Responsible  [phone]          Anticoagulation Patient Findings       Target end date:Indefinite     Indication: DVT, AF     Drug: Eliquis     CHADsVASC = at least 7    Health Status Since Last Assessment   Patient denies any new relevant medical problems, ED visits or hospitalizations   Patient denies any embolic events (stroke/tia/systemic embolism)    Adherence with DOAC Therapy   Pt has NO missed any doses in the average week    Bleeding Risk Assessment     Denies Epistaxis   Pt denies any excessive or unusual bleeding/hematomas.  Pt denies any GI bleeds or hematemesis.  Pt denies any concerning daily headache or sub dural hematoma symptoms.     Pt denies any hematuria    Latest Hemoglobin 14.6   ETOH overuse Negative     Creatinine Clearance/Renal Function     Latest ClCr >50 mL/min    Hepatic function   Latest LFTs WNL   Pt denies any history of liver dysfunction      Drug Interactions   Platelets: 116   ASA/other antiplatelets Negative   NSAID Negative   Other drug interactions Negative   X Verified no anticonvulsant or azole therapy, education  provided for future use.     Examination     Vitals:    01/08/24 0853   BP: (!) 137/92   Pulse: 60   Resp: 16   SpO2: 97%      Symptomatic hypotension Negative   Significant gait impairment/imbalance/high risk for falls? Negative    Final Assessment and Recommendations:   Patient appears stable from the anticoagulation standpoint.     Benefits of continued DOAC therapy outweigh risks for this patient   Recommend pt continue with current DOAC therapy Eliquis 5mg BID    DOAC is affordable    Watchman procedure scheduled for 1-9-24 with confirmation RADHA schedule for 2-29-24.  Will assess results of RADHA at end of February and close episode should Dr Simpson instruct patient to d/c anticoagulation.     Other Actions: cmp/ cbc hemogram ordered prior to next visit    Follow up:   None at this time, will await results of post-Watchman RADHA on 2-29-24    Scotty Faith, PharmD, BCACP

## 2024-01-09 ENCOUNTER — HOSPITAL ENCOUNTER (INPATIENT)
Facility: MEDICAL CENTER | Age: 69
LOS: 1 days | DRG: 274 | End: 2024-01-10
Attending: INTERNAL MEDICINE | Admitting: INTERNAL MEDICINE
Payer: MEDICARE

## 2024-01-09 ENCOUNTER — ANESTHESIA (OUTPATIENT)
Dept: CARDIOLOGY | Facility: MEDICAL CENTER | Age: 69
DRG: 274 | End: 2024-01-09
Payer: MEDICARE

## 2024-01-09 ENCOUNTER — APPOINTMENT (OUTPATIENT)
Dept: CARDIOLOGY | Facility: MEDICAL CENTER | Age: 69
DRG: 274 | End: 2024-01-09
Attending: INTERNAL MEDICINE
Payer: MEDICARE

## 2024-01-09 ENCOUNTER — ANESTHESIA EVENT (OUTPATIENT)
Dept: CARDIOLOGY | Facility: MEDICAL CENTER | Age: 69
DRG: 274 | End: 2024-01-09
Payer: MEDICARE

## 2024-01-09 DIAGNOSIS — I48.0 PAF (PAROXYSMAL ATRIAL FIBRILLATION) (HCC): ICD-10-CM

## 2024-01-09 LAB
ACT BLD: 380 SEC (ref 74–137)
EKG IMPRESSION: NORMAL
LV EJECT FRACT  99904: 55

## 2024-01-09 PROCEDURE — 700111 HCHG RX REV CODE 636 W/ 250 OVERRIDE (IP): Mod: JZ

## 2024-01-09 PROCEDURE — 93005 ELECTROCARDIOGRAM TRACING: CPT | Performed by: INTERNAL MEDICINE

## 2024-01-09 PROCEDURE — 770020 HCHG ROOM/CARE - TELE (206)

## 2024-01-09 PROCEDURE — 160002 HCHG RECOVERY MINUTES (STAT)

## 2024-01-09 PROCEDURE — 93355 ECHO TRANSESOPHAGEAL (TEE): CPT

## 2024-01-09 PROCEDURE — 93010 ELECTROCARDIOGRAM REPORT: CPT | Performed by: INTERNAL MEDICINE

## 2024-01-09 PROCEDURE — 700111 HCHG RX REV CODE 636 W/ 250 OVERRIDE (IP): Performed by: INTERNAL MEDICINE

## 2024-01-09 PROCEDURE — 700105 HCHG RX REV CODE 258: Performed by: ANESTHESIOLOGY

## 2024-01-09 PROCEDURE — 700111 HCHG RX REV CODE 636 W/ 250 OVERRIDE (IP): Mod: JZ | Performed by: ANESTHESIOLOGY

## 2024-01-09 PROCEDURE — C1894 INTRO/SHEATH, NON-LASER: HCPCS

## 2024-01-09 PROCEDURE — 700102 HCHG RX REV CODE 250 W/ 637 OVERRIDE(OP): Performed by: INTERNAL MEDICINE

## 2024-01-09 PROCEDURE — 02L73DK OCCLUSION OF LEFT ATRIAL APPENDAGE WITH INTRALUMINAL DEVICE, PERCUTANEOUS APPROACH: ICD-10-PCS | Performed by: INTERNAL MEDICINE

## 2024-01-09 PROCEDURE — 700105 HCHG RX REV CODE 258: Performed by: INTERNAL MEDICINE

## 2024-01-09 PROCEDURE — 700101 HCHG RX REV CODE 250

## 2024-01-09 PROCEDURE — 700117 HCHG RX CONTRAST REV CODE 255: Performed by: INTERNAL MEDICINE

## 2024-01-09 PROCEDURE — 85347 COAGULATION TIME ACTIVATED: CPT

## 2024-01-09 PROCEDURE — A9270 NON-COVERED ITEM OR SERVICE: HCPCS | Performed by: INTERNAL MEDICINE

## 2024-01-09 PROCEDURE — 160036 HCHG PACU - EA ADDL 30 MINS PHASE I

## 2024-01-09 PROCEDURE — 700101 HCHG RX REV CODE 250: Performed by: ANESTHESIOLOGY

## 2024-01-09 PROCEDURE — 33340 PERQ CLSR TCAT L ATR APNDGE: CPT | Mod: Q0 | Performed by: INTERNAL MEDICINE

## 2024-01-09 PROCEDURE — 160035 HCHG PACU - 1ST 60 MINS PHASE I

## 2024-01-09 RX ORDER — SODIUM CHLORIDE, SODIUM LACTATE, POTASSIUM CHLORIDE, CALCIUM CHLORIDE 600; 310; 30; 20 MG/100ML; MG/100ML; MG/100ML; MG/100ML
INJECTION, SOLUTION INTRAVENOUS CONTINUOUS
Status: DISCONTINUED | OUTPATIENT
Start: 2024-01-09 | End: 2024-01-10 | Stop reason: HOSPADM

## 2024-01-09 RX ORDER — HEPARIN SODIUM 1000 [USP'U]/ML
INJECTION, SOLUTION INTRAVENOUS; SUBCUTANEOUS
Status: COMPLETED
Start: 2024-01-09 | End: 2024-01-09

## 2024-01-09 RX ORDER — DIPHENHYDRAMINE HYDROCHLORIDE 50 MG/ML
12.5 INJECTION INTRAMUSCULAR; INTRAVENOUS
Status: DISCONTINUED | OUTPATIENT
Start: 2024-01-09 | End: 2024-01-09 | Stop reason: HOSPADM

## 2024-01-09 RX ORDER — MYCOPHENOLATE MOFETIL 250 MG/1
500 CAPSULE ORAL 2 TIMES DAILY
Status: DISCONTINUED | OUTPATIENT
Start: 2024-01-09 | End: 2024-01-10 | Stop reason: HOSPADM

## 2024-01-09 RX ORDER — TACROLIMUS 1 MG/1
1 CAPSULE ORAL 2 TIMES DAILY
Status: DISCONTINUED | OUTPATIENT
Start: 2024-01-09 | End: 2024-01-10 | Stop reason: HOSPADM

## 2024-01-09 RX ORDER — OMEPRAZOLE 20 MG/1
20 CAPSULE, DELAYED RELEASE ORAL DAILY
Status: DISCONTINUED | OUTPATIENT
Start: 2024-01-10 | End: 2024-01-10 | Stop reason: HOSPADM

## 2024-01-09 RX ORDER — ASPIRIN 81 MG/1
81 TABLET ORAL DAILY
Status: DISCONTINUED | OUTPATIENT
Start: 2024-01-09 | End: 2024-01-10 | Stop reason: HOSPADM

## 2024-01-09 RX ORDER — ONDANSETRON 2 MG/ML
4 INJECTION INTRAMUSCULAR; INTRAVENOUS
Status: DISCONTINUED | OUTPATIENT
Start: 2024-01-09 | End: 2024-01-09 | Stop reason: HOSPADM

## 2024-01-09 RX ORDER — PREDNISONE 5 MG/1
5 TABLET ORAL DAILY
Status: DISCONTINUED | OUTPATIENT
Start: 2024-01-10 | End: 2024-01-10 | Stop reason: HOSPADM

## 2024-01-09 RX ORDER — PROTAMINE SULFATE 10 MG/ML
INJECTION, SOLUTION INTRAVENOUS
Status: COMPLETED
Start: 2024-01-09 | End: 2024-01-09

## 2024-01-09 RX ORDER — OXYCODONE HCL 5 MG/5 ML
10 SOLUTION, ORAL ORAL
Status: DISCONTINUED | OUTPATIENT
Start: 2024-01-09 | End: 2024-01-09 | Stop reason: HOSPADM

## 2024-01-09 RX ORDER — LIDOCAINE HYDROCHLORIDE 20 MG/ML
INJECTION, SOLUTION INFILTRATION; PERINEURAL
Status: COMPLETED
Start: 2024-01-09 | End: 2024-01-09

## 2024-01-09 RX ORDER — HYDROMORPHONE HYDROCHLORIDE 1 MG/ML
0.2 INJECTION, SOLUTION INTRAMUSCULAR; INTRAVENOUS; SUBCUTANEOUS
Status: DISCONTINUED | OUTPATIENT
Start: 2024-01-09 | End: 2024-01-09 | Stop reason: HOSPADM

## 2024-01-09 RX ORDER — HYDROMORPHONE HYDROCHLORIDE 1 MG/ML
0.4 INJECTION, SOLUTION INTRAMUSCULAR; INTRAVENOUS; SUBCUTANEOUS
Status: DISCONTINUED | OUTPATIENT
Start: 2024-01-09 | End: 2024-01-09 | Stop reason: HOSPADM

## 2024-01-09 RX ORDER — OXYCODONE HCL 5 MG/5 ML
5 SOLUTION, ORAL ORAL
Status: DISCONTINUED | OUTPATIENT
Start: 2024-01-09 | End: 2024-01-09 | Stop reason: HOSPADM

## 2024-01-09 RX ORDER — SODIUM CHLORIDE, SODIUM LACTATE, POTASSIUM CHLORIDE, CALCIUM CHLORIDE 600; 310; 30; 20 MG/100ML; MG/100ML; MG/100ML; MG/100ML
INJECTION, SOLUTION INTRAVENOUS CONTINUOUS
Status: ACTIVE | OUTPATIENT
Start: 2024-01-09 | End: 2024-01-09

## 2024-01-09 RX ORDER — DEXAMETHASONE SODIUM PHOSPHATE 4 MG/ML
INJECTION, SOLUTION INTRA-ARTICULAR; INTRALESIONAL; INTRAMUSCULAR; INTRAVENOUS; SOFT TISSUE PRN
Status: DISCONTINUED | OUTPATIENT
Start: 2024-01-09 | End: 2024-01-09 | Stop reason: SURG

## 2024-01-09 RX ORDER — ONDANSETRON 2 MG/ML
INJECTION INTRAMUSCULAR; INTRAVENOUS PRN
Status: DISCONTINUED | OUTPATIENT
Start: 2024-01-09 | End: 2024-01-09 | Stop reason: SURG

## 2024-01-09 RX ORDER — HALOPERIDOL 5 MG/ML
1 INJECTION INTRAMUSCULAR
Status: DISCONTINUED | OUTPATIENT
Start: 2024-01-09 | End: 2024-01-09 | Stop reason: HOSPADM

## 2024-01-09 RX ORDER — GABAPENTIN 300 MG/1
600 CAPSULE ORAL 2 TIMES DAILY
Status: DISCONTINUED | OUTPATIENT
Start: 2024-01-09 | End: 2024-01-10 | Stop reason: HOSPADM

## 2024-01-09 RX ORDER — HEPARIN SODIUM 200 [USP'U]/100ML
INJECTION, SOLUTION INTRAVENOUS
Status: COMPLETED
Start: 2024-01-09 | End: 2024-01-09

## 2024-01-09 RX ORDER — HYDROMORPHONE HYDROCHLORIDE 1 MG/ML
0.1 INJECTION, SOLUTION INTRAMUSCULAR; INTRAVENOUS; SUBCUTANEOUS
Status: DISCONTINUED | OUTPATIENT
Start: 2024-01-09 | End: 2024-01-09 | Stop reason: HOSPADM

## 2024-01-09 RX ORDER — LIDOCAINE HYDROCHLORIDE 40 MG/ML
SOLUTION TOPICAL
Status: COMPLETED
Start: 2024-01-09 | End: 2024-01-09

## 2024-01-09 RX ORDER — METOPROLOL SUCCINATE 25 MG/1
25 TABLET, EXTENDED RELEASE ORAL DAILY
Status: DISCONTINUED | OUTPATIENT
Start: 2024-01-10 | End: 2024-01-10 | Stop reason: HOSPADM

## 2024-01-09 RX ORDER — BUPIVACAINE HYDROCHLORIDE 5 MG/ML
INJECTION, SOLUTION EPIDURAL; INTRACAUDAL
Status: COMPLETED
Start: 2024-01-09 | End: 2024-01-09

## 2024-01-09 RX ORDER — CEFAZOLIN SODIUM 1 G/3ML
INJECTION, POWDER, FOR SOLUTION INTRAMUSCULAR; INTRAVENOUS PRN
Status: DISCONTINUED | OUTPATIENT
Start: 2024-01-09 | End: 2024-01-09 | Stop reason: SURG

## 2024-01-09 RX ORDER — LIDOCAINE HYDROCHLORIDE 40 MG/ML
SOLUTION TOPICAL PRN
Status: DISCONTINUED | OUTPATIENT
Start: 2024-01-09 | End: 2024-01-09 | Stop reason: SURG

## 2024-01-09 RX ADMIN — ASPIRIN 81 MG: 81 TABLET, COATED ORAL at 18:07

## 2024-01-09 RX ADMIN — PROPOFOL 200 MG: 10 INJECTION, EMULSION INTRAVENOUS at 12:22

## 2024-01-09 RX ADMIN — IOHEXOL 50 ML: 350 INJECTION, SOLUTION INTRAVENOUS at 12:55

## 2024-01-09 RX ADMIN — BUPIVACAINE HYDROCHLORIDE: 5 INJECTION, SOLUTION EPIDURAL; INTRACAUDAL at 12:17

## 2024-01-09 RX ADMIN — MYCOPHENOLATE MOFETIL 500 MG: 250 CAPSULE ORAL at 21:18

## 2024-01-09 RX ADMIN — ONDANSETRON 4 MG: 2 INJECTION INTRAMUSCULAR; INTRAVENOUS at 12:49

## 2024-01-09 RX ADMIN — SODIUM CHLORIDE, POTASSIUM CHLORIDE, SODIUM LACTATE AND CALCIUM CHLORIDE: 600; 310; 30; 20 INJECTION, SOLUTION INTRAVENOUS at 12:15

## 2024-01-09 RX ADMIN — APIXABAN 5 MG: 5 TABLET, FILM COATED ORAL at 18:08

## 2024-01-09 RX ADMIN — ROCURONIUM BROMIDE 50 MG: 10 INJECTION, SOLUTION INTRAVENOUS at 12:22

## 2024-01-09 RX ADMIN — HEPARIN SODIUM: 1000 INJECTION, SOLUTION INTRAVENOUS; SUBCUTANEOUS at 12:35

## 2024-01-09 RX ADMIN — SODIUM CHLORIDE, POTASSIUM CHLORIDE, SODIUM LACTATE AND CALCIUM CHLORIDE: 600; 310; 30; 20 INJECTION, SOLUTION INTRAVENOUS at 18:06

## 2024-01-09 RX ADMIN — SUGAMMADEX 200 MG: 100 INJECTION, SOLUTION INTRAVENOUS at 12:57

## 2024-01-09 RX ADMIN — DEXAMETHASONE SODIUM PHOSPHATE 4 MG: 4 INJECTION INTRA-ARTICULAR; INTRALESIONAL; INTRAMUSCULAR; INTRAVENOUS; SOFT TISSUE at 12:49

## 2024-01-09 RX ADMIN — GABAPENTIN 600 MG: 300 CAPSULE ORAL at 18:07

## 2024-01-09 RX ADMIN — HEPARIN SODIUM 4000 UNITS: 200 INJECTION, SOLUTION INTRAVENOUS at 12:17

## 2024-01-09 RX ADMIN — PROTAMINE SULFATE 50 MG: 10 INJECTION, SOLUTION INTRAVENOUS at 12:55

## 2024-01-09 RX ADMIN — LIDOCAINE HYDROCHLORIDE 4 ML: 40 SOLUTION TOPICAL at 12:24

## 2024-01-09 RX ADMIN — CEFAZOLIN 2 G: 1 INJECTION, POWDER, FOR SOLUTION INTRAMUSCULAR; INTRAVENOUS at 12:27

## 2024-01-09 RX ADMIN — LIDOCAINE HYDROCHLORIDE: 20 INJECTION, SOLUTION INFILTRATION; PERINEURAL at 12:17

## 2024-01-09 RX ADMIN — TACROLIMUS 1 MG: 1 CAPSULE ORAL at 21:18

## 2024-01-09 ASSESSMENT — FIBROSIS 4 INDEX: FIB4 SCORE: 2.74

## 2024-01-09 ASSESSMENT — PAIN DESCRIPTION - PAIN TYPE
TYPE: SURGICAL PAIN

## 2024-01-09 ASSESSMENT — PATIENT HEALTH QUESTIONNAIRE - PHQ9
2. FEELING DOWN, DEPRESSED, IRRITABLE, OR HOPELESS: NOT AT ALL
SUM OF ALL RESPONSES TO PHQ9 QUESTIONS 1 AND 2: 0
1. LITTLE INTEREST OR PLEASURE IN DOING THINGS: NOT AT ALL

## 2024-01-09 ASSESSMENT — COGNITIVE AND FUNCTIONAL STATUS - GENERAL
SUGGESTED CMS G CODE MODIFIER DAILY ACTIVITY: CH
MOBILITY SCORE: 24
SUGGESTED CMS G CODE MODIFIER MOBILITY: CH
DAILY ACTIVITIY SCORE: 24

## 2024-01-09 ASSESSMENT — LIFESTYLE VARIABLES
TOTAL SCORE: 0
HAVE YOU EVER FELT YOU SHOULD CUT DOWN ON YOUR DRINKING: NO
HOW MANY TIMES IN THE PAST YEAR HAVE YOU HAD 5 OR MORE DRINKS IN A DAY: 0
TOTAL SCORE: 0
DOES PATIENT WANT TO STOP DRINKING: NO
ALCOHOL_USE: NO
EVER FELT BAD OR GUILTY ABOUT YOUR DRINKING: NO
HAVE PEOPLE ANNOYED YOU BY CRITICIZING YOUR DRINKING: NO
AVERAGE NUMBER OF DAYS PER WEEK YOU HAVE A DRINK CONTAINING ALCOHOL: 0
CONSUMPTION TOTAL: NEGATIVE
TOTAL SCORE: 0
EVER HAD A DRINK FIRST THING IN THE MORNING TO STEADY YOUR NERVES TO GET RID OF A HANGOVER: NO
ON A TYPICAL DAY WHEN YOU DRINK ALCOHOL HOW MANY DRINKS DO YOU HAVE: 0

## 2024-01-09 ASSESSMENT — CHA2DS2 SCORE
PRIOR STROKE OR TIA OR THROMBOEMBOLISM: NO
AGE 75 OR GREATER: NO
CHF OR LEFT VENTRICULAR DYSFUNCTION: YES
HYPERTENSION: YES
SEX: MALE
VASCULAR DISEASE: YES
DIABETES: YES
CHA2DS2 VASC SCORE: 5
AGE 65 TO 74: YES

## 2024-01-09 NOTE — ANESTHESIA PROCEDURE NOTES
RADHA    Date/Time: 1/9/2024 12:27 PM    Performed by: Anup Keane M.D.  Authorized by: Anup Keane M.D.    Start Time:1/9/2024 12:27 PM  Preanesthetic Checklist: patient identified, IV checked, site marked, risks and benefits discussed, surgical consent, monitors and equipment checked, pre-op evaluation and timeout performed    Indication for RADHA: diagnostic   Patient Location: OR  Intubated: Yes  Bite Block: Yes  Heart Visualized: Yes  Insertion: atraumatic    **See FULL RADHA report in patient's chart via CV Syn[facility]**

## 2024-01-09 NOTE — H&P
[facility]  Electrophysiology Pre-procedure H&P    DOS:1/9/2024    Planned Procedure: PIPE-C    Chief complaint/Reason for Procedure: pAF    HPI: 67 y/o M with pAF for PIPE-C due to easy bruising      Past Medical History:   Diagnosis Date    Benign essential hypertension     Diabetes (HCC)     type 2    Heart burn     Hyperlipoproteinemia     Hypertension     not on meds anymore    Myocardial infarct (HCC) 2013    stent    Pain     Polycystic kidney 09/10/2010    RIGHT KIDNEY TRANSPLANT    Sleep apnea     Snoring        Past Surgical History:   Procedure Laterality Date    KNEE MANIPULATION  2/16/2012    Performed by LATOYA CONNER at SURGERY [facility] ORS    KNEE UNICOMPARTMENTAL  12/23/2011    Performed by LATOYA CONNER at SURGERY [facility] ORS    KNEE ARTHROSCOPY  12/23/2011    Performed by LATOYA CONNER at SURGERY [facility] ORS    KNEE ARTHROSCOPY  5/3/2011    Performed by HANANE GOLDMAN at SURGERY SAME DAY [facility] ORS    MENISCECTOMY, KNEE, MEDIAL  5/3/2011    Performed by HANANE GOLDMAN at SURGERY SAME DAY [facility] ORS    OTHER  9/10/10    RIGHT KIDNEY TRANSPLANT    KNEE ARTHROPLASTY TOTAL  1/12/07    RIGHT    KNEE ARTHROSCOPY  4/10/06    RIGHT    OTHER ORTHOPEDIC SURGERY  7/8/74    LEFT KNEE DEBRIDEMENT       Social History     Socioeconomic History    Marital status:      Spouse name: Not on file    Number of children: Not on file    Years of education: Not on file    Highest education level: Not on file   Occupational History    Not on file   Tobacco Use    Smoking status: Never    Smokeless tobacco: Never   Vaping Use    Vaping Use: Never used   Substance and Sexual Activity    Alcohol use: No    Drug use: No    Sexual activity: Yes     Partners: Female   Other Topics Concern    Not on file   Social History Narrative    Not on file     Social Determinants of Health     Financial Resource Strain: Not on file   Food Insecurity: Not on file   Transportation Needs: Not on  "file   Physical Activity: Not on file   Stress: Not on file   Social Connections: Not on file   Intimate Partner Violence: Not on file   Housing Stability: Not on file       History reviewed. No pertinent family history.    Allergies   Allergen Reactions    Doxycycline Rash     Sweats and shakes: 9/28/17: Clarified allergy with patient. Allergy was in 1998 and he doesn't remember what happened. He thought the medication is for pain.  Tolerates doxycycline 9/2017       Current Facility-Administered Medications   Medication Dose Route Frequency Provider Last Rate Last Admin    lactated ringers infusion   Intravenous Continuous Mark Simpson M.D.           Physical Exam:  Vitals:    01/09/24 1111 01/09/24 1113   BP:  (!) 155/88   Pulse:  (!) 58   Resp:  20   Temp:  36.7 °C (98 °F)   TempSrc:  Temporal   SpO2:  97%   Weight: 110 kg (242 lb 1 oz)    Height: 1.956 m (6' 5\")      General appearance: NAD, conversant   Neck: Trachea midline; FROM, supple, no thyromegaly or lymphadenopathy  CV: RRR, no MRGs, no JVD   Extremities: No peripheral edema or extremity lymphadenopathy  Skin: Normal temperature, turgor and texture; no rash, ulcers or subcutaneous nodules  Psych: Appropriate affect, alert and oriented to person, place and time    Data:  Lab Results   Component Value Date/Time    CHOLSTRLTOT 122 11/14/2023 08:43 AM    LDL 48 11/14/2023 08:43 AM    HDL 55 11/14/2023 08:43 AM    TRIGLYCERIDE 96 11/14/2023 08:43 AM       Lab Results   Component Value Date/Time    SODIUM 141 01/05/2024 08:31 AM    POTASSIUM 5.1 01/05/2024 08:31 AM    CHLORIDE 105 01/05/2024 08:31 AM    CO2 26 01/05/2024 08:31 AM    GLUCOSE 109 (H) 01/05/2024 08:31 AM    BUN 38 (H) 01/05/2024 08:31 AM    CREATININE 1.76 (H) 01/05/2024 08:31 AM    CREATININE 2.4 (H) 12/18/2006 06:20 AM     Lab Results   Component Value Date/Time    ALKPHOSPHAT 75 11/14/2023 08:43 AM    ASTSGOT 19 11/14/2023 08:43 AM    ALTSGPT 16 11/14/2023 08:43 AM    TBILIRUBIN 0.4 " "11/14/2023 08:43 AM      No results found for: \"BNPBTYPENAT\"            EKG interpreted by me: SR    Impression/Plan:  1)  pAF    Plan PIPE-C. Risks include vascular access bleeding or pain, infection, stroke, myocardial infarction, cardiac tamponade, pericardial effusion, myocardial perforation, major bleeding, and death. Risk of major adverse event is ~1%.     Mark Simpson MD  Cardiac Electrophysiology    "

## 2024-01-09 NOTE — OR NURSING
1305 - Pt arrived from cath lab with oral airway on 8L O2 via mask. NAD, VSS. Rt groin site soft, dressing intact.    1309 - Oral airway dc'd    1326 - NP at bedside.    1340 - Pt on RA    1355 - EKG tech called to complete EKG order    1400 - Pt voided 250ml of clear yellow urine.    1406 - EKG tech at bedside    1625 - pt voided 625ml of clear yellow urine into urinal. Pt tolerating sips of water.    1725 - Pt son at bedside    1730 - Report to LAN Dey on tele 8    1745 - Pt transported to [id number] by RN on [facility] with monitor. VSS. NAD. Belongings with pt. Groin site C/D/I, soft.

## 2024-01-09 NOTE — ANESTHESIA PROCEDURE NOTES
Airway    Date/Time: 1/9/2024 12:24 PM    Performed by: Anup Keane M.D.  Authorized by: Anup Keane M.D.    Location:  OR  Urgency:  Elective  Difficult Airway: No    Indications for Airway Management:  Anesthesia      Spontaneous Ventilation: absent    Sedation Level:  Deep  Preoxygenated: Yes    Patient Position:  Sniffing  Mask Difficulty Assessment:  0 - not attempted  Final Airway Type:  Endotracheal airway  Final Endotracheal Airway:  ETT  Cuffed: Yes    Technique Used for Successful ETT Placement:  Direct laryngoscopy    Insertion Site:  Oral  Blade Type:  Hever  Laryngoscope Blade/Videolaryngoscope Blade Size:  3  ETT Size (mm):  7.5  Measured from:  Teeth  ETT to Teeth (cm):  23  Placement Verified by: auscultation and capnometry    Cormack-Lehane Classification:  Grade I - full view of glottis  Number of Attempts at Approach:  1

## 2024-01-09 NOTE — OP REPORT
"[facility] Electrophysiology/Structural Heart Procedure Note     Procedure(s) Performed:   1) Watchman PIPE closure    Indication(s): Atrial fibrillation    Physician(s): Mark Simpson M.D.     Resident/Assistant(s): None     Anesthesia: General endotracheal anesthetic with Dr. Keane     Specimen(s) Removed: None     Estimated Blood Loss:  30cc     Complications:  None     Description of Procedure:   After informed written consent, the patient was brought to the cath lab in the fasting, non-sedated state. The patient was prepped and draped in the usual sterile fashion. Femoral venous access was obtained using the modified Seldinger technique. This was done under direct US guidance to visualize the needle insertion. Images were saved to the PACS system. In the right femoral vein, an 8 Fr sheath were inserted over 0.035” guidewire and exchanged for an 8.5 Fr [place] trans-septal sheath. RADHA was used to identify the atrial septum, and left atrial appendage.  A [place] trans-septal needle was inserted to into the trans-septal sheath, and under ultrasound guidance a inferior/posterior trans-septal location was used to cross into the left atrium. Intravenous heparin was given to target an -300. A stiff exchange length 0.035\" wire was inserted into the left atrium/left pulmonary veins, over which we exchanged to the WATCHMAN delivery sheath. The WATCHMAN device was prepped and flushed. A pigtail catheter was advanced into the delivery sheath into the left atrium and then after counter clockwise torque maneuvered into the body of the left atrial appendage. Cine was taken to verify the location of the pigtail in the appendage and to assess for depth. The sheath was then advanced over the pigtail until sufficient depth was reached.  The pigtail was removed, and under wet to wet connection the WATCHMAN device was advanced through the delivery sheath until markers were aligned. The sheath was retracted slowly to deploy " the device. After the device was deployed we assessed again for leak with contrast injected through the sheath as well as a tug test. We verified good position, anchoring, size, and seal with no/minimal leak with RADHA. After all criteria were met we detached the device from the delivery system. At the end of the procedure, heparin was reversed with protamine, the catheter and sheaths were removed, and hemostasis was achieved by manual compression along with a figure of 8 silk suture. Following recovery from anesthesia, the patient was transferred to the PPU in good condition.        Fluoroscopy time:  3.5 min     Contrast used: 48cc     Device implanted:  Size 20mm  Serial # [id number]  Max appendage pre-measurement 13.3mm  Max device measurement 12-20% compression     Impressions:    1. Successful PIPE closure      Recommendations:  1. DOAC and ASA 81  2. Admit to monitored bedrest.  3. Echo and removal of figure of 8 suture in the AM

## 2024-01-09 NOTE — ANESTHESIA POSTPROCEDURE EVALUATION
Patient: Jama Altman    Procedure Summary       Date: 01/09/24 Room / Location: [facility] Imaging - Cath Lab [facility]    Anesthesia Start: 1215 Anesthesia Stop: 1308    Procedure: CL-LEFT ATRIAL APPENDAGE CLOSURE Diagnosis:       PAF (paroxysmal atrial fibrillation) (HCC)      (See Associated Dx)    Scheduled Providers: Mark Simpson M.D.; Anup eKane M.D. Responsible Provider: Anup Keane M.D.    Anesthesia Type: general ASA Status: 3            Final Anesthesia Type: general  Last vitals  BP   Blood Pressure : (!) 140/61    Temp   36.4 °C (97.5 °F)    Pulse   (!) 58   Resp   14    SpO2   97 %      Anesthesia Post Evaluation    Patient location during evaluation: PACU  Patient participation: complete - patient participated  Level of consciousness: awake and alert    Airway patency: patent  Anesthetic complications: no  Cardiovascular status: hemodynamically stable  Respiratory status: face mask    PONV: none          No notable events documented.     Nurse Pain Score: 0 (NPRS)

## 2024-01-09 NOTE — DISCHARGE INSTRUCTIONS
[facility] POST WATCHMAN INSTRUCTIONS  No lifting > 10 lbs x 1 week.  No baths or hot tubs x 1 week.      May shower on 1/11/2024 and take off groin dressing and leave site uncovered.  Please try to keep Steri-strip in place and allow to come off on its own.  Continue to monitor site daily for warmth, redness, discolored drainage.    3. Please do not miss any doses of your blood thinner.      4. Please keep all follow up appointments scheduled for you.    5. A transesophageal echocardiogram (RADHA) will be scheduled for you  to check the healing of the Watchman.  This procedure is completed at [facility] same day procedures.  We use sedation/anesthesia for this procedure so you will need a .     6. Please walk and take deep breaths after discharge.  After discharge, if you experience severe chest pain, shortness of breath, neurological changes, high fever, severe dizziness, trouble with catheter site needs to be seen in the emergency dept.

## 2024-01-09 NOTE — ANESTHESIA TIME REPORT
Anesthesia Start and Stop Event Times       Date Time Event    1/9/2024 1215 Anesthesia Start     1218 Ready for Procedure     1308 Anesthesia Stop          Responsible Staff  01/09/24      Name Role Begin End    Anup Keane M.D. Anesth 1215 1308          Overtime Reason:  no overtime (within assigned shift)    Comments:

## 2024-01-09 NOTE — ANESTHESIA POSTPROCEDURE EVALUATION
Patient: Jama Altman    Procedure Summary       Date: 01/09/24 Room / Location: [facility] Imaging - Cath Lab [facility]    Anesthesia Start: 1215 Anesthesia Stop: 1308    Procedure: CL-LEFT ATRIAL APPENDAGE CLOSURE Diagnosis:       PAF (paroxysmal atrial fibrillation) (HCC)      (See Associated Dx)    Scheduled Providers: Mark Simpson M.D.; Anup Keane M.D. Responsible Provider: Anup Keane M.D.    Anesthesia Type: general ASA Status: 3            Final Anesthesia Type: general  Last vitals  BP   Blood Pressure : (!) 140/61    Temp   36.4 °C (97.5 °F)    Pulse   63   Resp   14    SpO2   100 %      Anesthesia Post Evaluation    Patient location during evaluation: PACU  Patient participation: complete - patient participated  Level of consciousness: awake and alert    Airway patency: patent  Anesthetic complications: no  Cardiovascular status: hemodynamically stable  Respiratory status: face mask    PONV: none          No notable events documented.     Nurse Pain Score: 0 (NPRS)           Post-Op Assessment Note    CV Status:  Stable    Pain management: adequate     Mental Status:  Alert and awake   Hydration Status:  Euvolemic   PONV Controlled:  Controlled   Airway Patency:  Patent      Post Op Vitals Reviewed: Yes      Staff: Anesthesiologist         No notable events documented.     BP (!) 174/69 (10/13/23 1711)    Temp (!) 97.2 °F (36.2 °C) (10/13/23 1711)    Pulse 67 (10/13/23 1711)   Resp 16 (10/13/23 1711)    SpO2 91 % (10/13/23 1711)

## 2024-01-09 NOTE — ANESTHESIA PREPROCEDURE EVALUATION
" Date/Time: 01/09/24 1300    Scheduled providers: Mark Simpson M.D.; Anup Keane M.D.    Procedure: CL-LEFT ATRIAL APPENDAGE CLOSURE    Diagnosis: PAF (paroxysmal atrial fibrillation) (HCC) [I48.0]    Indications: See Associated Dx    Location: [facility] - Cath Lab - [facility]            Relevant Problems   CARDIAC   (positive) Abdominal aortic aneurysm (AAA) without rupture (HCC)   (positive) Acute deep vein thrombosis (DVT) of left upper extremity (HCC)   (positive) Atherosclerosis of aorta (HCC)   (positive) Benign essential hypertension   (positive) CAD (coronary artery disease)   (positive) Old myocardial infarction   (positive) PAF (paroxysmal atrial fibrillation) (HCC)      GI   (positive) GERD (gastroesophageal reflux disease)         (positive) Hypertensive heart and chronic kidney disease with heart failure and stage 1 through stage 4 chronic kidney disease, or unspecified chronic kidney disease (HCC)   (positive) Polycystic kidney      ENDO   (positive) Type 2 diabetes mellitus with hyperlipidemia (HCC)   (positive) Type 2 diabetes mellitus with kidney complication, without long-term current use of insulin (HCC)      Other   (positive) History of renal transplant     BP (!) 155/88   Pulse (!) 58   Temp 36.7 °C (98 °F) (Temporal)   Resp 20   Ht 1.956 m (6' 5\")   Wt 110 kg (242 lb 1 oz)   SpO2 97%   BMI 28.70 kg/m²     Physical Exam    Airway   Mallampati: II  TM distance: >3 FB  Neck ROM: full       Cardiovascular - normal exam  Rhythm: regular  Rate: normal  (-) murmur     Dental - normal exam           Pulmonary - normal exam  Breath sounds clear to auscultation     Abdominal    Neurological - normal exam                   Anesthesia Plan    ASA 3       Plan - general       Airway plan will be ETT  RADHA Planned        Induction: intravenous    Postoperative Plan: Postoperative administration of opioids is intended.    Pertinent diagnostic labs and testing " reviewed    Informed Consent:    Anesthetic plan and risks discussed with patient.    Use of blood products discussed with: patient whom consented to blood products.

## 2024-01-10 VITALS
SYSTOLIC BLOOD PRESSURE: 129 MMHG | TEMPERATURE: 98.3 F | BODY MASS INDEX: 27.75 KG/M2 | WEIGHT: 235.01 LBS | HEIGHT: 77 IN | OXYGEN SATURATION: 96 % | RESPIRATION RATE: 19 BRPM | DIASTOLIC BLOOD PRESSURE: 90 MMHG | HEART RATE: 73 BPM

## 2024-01-10 LAB — EKG IMPRESSION: NORMAL

## 2024-01-10 PROCEDURE — 700111 HCHG RX REV CODE 636 W/ 250 OVERRIDE (IP): Performed by: INTERNAL MEDICINE

## 2024-01-10 PROCEDURE — 93010 ELECTROCARDIOGRAM REPORT: CPT | Performed by: INTERNAL MEDICINE

## 2024-01-10 PROCEDURE — 93005 ELECTROCARDIOGRAM TRACING: CPT | Performed by: INTERNAL MEDICINE

## 2024-01-10 PROCEDURE — A9270 NON-COVERED ITEM OR SERVICE: HCPCS | Performed by: INTERNAL MEDICINE

## 2024-01-10 PROCEDURE — 700102 HCHG RX REV CODE 250 W/ 637 OVERRIDE(OP): Performed by: INTERNAL MEDICINE

## 2024-01-10 RX ORDER — ASPIRIN 81 MG/1
81 TABLET ORAL DAILY
Qty: 100 TABLET | Refills: 1 | Status: SHIPPED | OUTPATIENT
Start: 2024-01-10

## 2024-01-10 RX ADMIN — MYCOPHENOLATE MOFETIL 500 MG: 250 CAPSULE ORAL at 05:31

## 2024-01-10 RX ADMIN — METOPROLOL SUCCINATE 25 MG: 25 TABLET, EXTENDED RELEASE ORAL at 05:32

## 2024-01-10 RX ADMIN — PREDNISONE 5 MG: 5 TABLET ORAL at 05:31

## 2024-01-10 RX ADMIN — OMEPRAZOLE 20 MG: 20 CAPSULE, DELAYED RELEASE ORAL at 05:32

## 2024-01-10 RX ADMIN — TACROLIMUS 1 MG: 1 CAPSULE ORAL at 05:31

## 2024-01-10 RX ADMIN — GABAPENTIN 600 MG: 300 CAPSULE ORAL at 05:30

## 2024-01-10 RX ADMIN — APIXABAN 5 MG: 5 TABLET, FILM COATED ORAL at 05:31

## 2024-01-10 ASSESSMENT — FIBROSIS 4 INDEX: FIB4 SCORE: 2.74

## 2024-01-10 NOTE — PROGRESS NOTES
Pt DC'd. IV removed, discharge instructions provided to patient, pt verbalizes understanding. Pt states all questions have been answered. Copy of discharge paperwork provided to pt, signed copy in chart. Pt states all belongings in possession. Pt declined aspirin from [facility], states will buy OTC. Pt left unit via walking.

## 2024-01-10 NOTE — PROGRESS NOTES
Patient being discharged. Pt educated on discharge instructions and new prescriptions, verbalized understanding. Follow up appointment made with [facility]. PIV removed, monitor checked in. Patient going discharge lounge via wheelchair

## 2024-01-10 NOTE — DISCHARGE SUMMARY
Discharge Summary    CHIEF COMPLAINT ON ADMISSION  Elective admission for WATCHMAN procedure     CODE STATUS  Full Code     HPI & HOSPITAL COURSE  Jama Altman is a very pleasant 68 y.o. year old male electively admitted for left atrial appendage closure due to his history of excessive bruising on anticoagulation.      Patient underwent successful PIPE closure via WATCHMEN with Dr. Simpson on 1/9/2024.     Patient has been seen and examined in EP rounds this AM. Monitored rhythm is AF/NSR.  EKG and vital signs have been reviewed and are unremarkable. Patient denies any chest pain, dyspnea, dizziness, paraesthesias, or other complaints. Physical exam is without acute abnormality; specifically his right femoral access site is clean and dry.  The figure eight suture was removed without difficulty or evidence of hematoma or bleeding.      Post WATCHMEN discharge instructions were reviewed with patient, all questions were answered.      Therefore, he is discharged in good and stable condition with close outpatient follow-up as listed below.      PROCEDURES  STEW procedure, Dr. Simpson, 1/9/2024.  Please see full procedure note for details.      CONSULTATIONS  None      FOLLOW UP  Future Appointments   Date Time Provider Department Center   2/29/2024 11:00 AM [facility] EXAM 3 RADHA [facility]   3/6/2024  9:15 AM SHARON Cadet CARCB None     [facility]  [address]  [facility] [id number] [phone]  Go on 3/6/2024  Hospital Follow Up with SHARON Charles 9:15 AM    [facility]  [address] [facility] [id number]  Go on 2/29/2024  For RADHA at 11:00 AM (arrive by 9:00 AM)  [phone]      MEDICATIONS ON DISCHARGE     Medication List        START taking these medications        Instructions   aspirin 81 MG EC tablet   Take 1 Tablet by mouth every day.  Dose: 81 mg            CONTINUE taking these medications        Instructions   amoxicillin-clavulanate  875-125 MG Tabs  Commonly known as: Augmentin   Take 1 Tablet by mouth 2 times a day.  Dose: 1 Tablet     Eliquis 5mg Tabs  Generic drug: apixaban   TAKE 1 TABLET BY MOUTH TWICE DAILY     gabapentin 300 MG Caps  Commonly known as: Neurontin   TAKE 2 CAPSULES BY MOUTH TWICE DAILY     glyBURIDE 5 MG Tabs  Commonly known as: Diabeta   Take 2 Tablets by mouth 2 times a day with meals.  Dose: 10 mg     metoprolol SR 25 MG Tb24  Commonly known as: Toprol XL   Take 1 Tablet by mouth every day.  Dose: 25 mg     mycophenolate 500 MG tablet  Commonly known as: Cellcept   Take 500 mg by mouth 2 times a day.  Dose: 500 mg     omeprazole 20 MG delayed-release capsule  Commonly known as: PriLOSEC   Take 1 Capsule by mouth every day.  Dose: 20 mg     pioglitazone 45 MG Tabs  Commonly known as: Actos   TAKE ONE TABLET BY MOUTH ONCE DAILY  Dose: 45 mg     predniSONE 5 MG Tabs  Commonly known as: Deltasone   Take 1 Tablet by mouth every day.  Dose: 5 mg     tacrolimus 1 MG Caps  Commonly known as: Prograf   Take 1 Capsule by mouth 2 times a day.  Dose: 1 mg     tadalafil 5 MG tablet  Commonly known as: Cialis   : TAKE 1 TABLETS 30 MINUTES BEFORE SEXUAL ACTIVITY, DO NOT EXCEED MORE THAN ONE DOSE A DAY     Tradjenta 5 MG Tabs tablet  Generic drug: linagliptin   Take 1 Tablet by mouth every day.  Dose: 5 mg

## 2024-01-10 NOTE — PROGRESS NOTES
Monitor Summary:  Rhythm: Afib converted to SR Rate:   Ectopies: rare PVC, frequent PAC, in and out of SR, BBB  Measurement: ./.13/.

## 2024-01-10 NOTE — DISCHARGE PLANNING
H[facility]/ [facility] TCN chart review completed. Due to low LACE 50 and high 6 click score 24 ADL's and 24 mobility as well as patients currently documented level of function, no TCN needs anticipated in patient discharge planning at this time.  Per Kardex, patient ambulating 500 feet X 2, no AD.  Should post acute discharge needs arise warranting TCN involvement, please contact TCN team via Voalte. Thank you.

## 2024-01-10 NOTE — PROGRESS NOTES
4 Eyes Skin Assessment Completed by LAN Jaramillo and LAN Dey.    Head WDL  Ears WDL  Nose WDL  Mouth WDL  Neck WDL  Breast/Chest WDL  Shoulder Blades WDL  Spine WDL  (R) Arm/Elbow/Hand WDL  (L) Arm/Elbow/Hand WDL  Abdomen WDL  Groin Incision (right groin, old drainage)  Scrotum/Coccyx/Buttocks WDL  (R) Leg Scar (old scar)  (L) Leg WDL  (R) Heel/Foot/Toe Dry  (L) Heel/Foot/Toe Dry    Devices In Places Tele Box, Blood Pressure Cuff, and Pulse Ox    Interventions In Place Pillows    Possible Skin Injury No    Pictures Uploaded Into Epic N/A  Wound Consult Placed N/A  RN Wound Prevention Protocol Ordered No

## 2024-01-10 NOTE — PROGRESS NOTES
Received report from LAN Brown (PACU). Pt is A&O x 4. Pt transported via [facility] with ACLS RN and zoll monitor. Pt arrived to unit, tele box on patient, confirmed with monitor room. Pt transferred via slide board from rInlet to bed tolerated well. Pt oriented to unit and call light, verbalized understanding. Family present at bedside. Pt on bedrest. Fall precautions in place. Call light and bedside table within reach, bed locked in lowest position with controls on. Assessment completed. Will continue to monitor.

## 2024-01-15 RX ORDER — TADALAFIL 5 MG/1
TABLET ORAL
Qty: 15 TABLET | Refills: 3 | Status: SHIPPED | OUTPATIENT
Start: 2024-01-15

## 2024-01-16 ENCOUNTER — TELEPHONE (OUTPATIENT)
Dept: CARDIOLOGY | Facility: MEDICAL CENTER | Age: 69
End: 2024-01-16
Payer: MEDICARE

## 2024-01-16 NOTE — TELEPHONE ENCOUNTER
1 WEEK POST WATCHMAN FOLLOW UP WOUND CHECK:    Patient is s/p PIPE closure with Watchman device by Dr. Simpson on (1/9/24) for PAF with intolerance to anticoagulation.    Wound check completed in office. Right femoral access site appears CDI/GABBY. No evidence of active bleeding, edema, erythema, or hematoma present.     Patient to follow up in 3-4 weeks as previously scheduled. Patient instructed to contact the office with any questions or concerns.     Future Appointments   Date Time Provider Department Center   2/29/2024 11:00 AM [facility] EXAM 3 RADHA [facility]   3/6/2024  9:15 AM SHARON Cadet None       Thanks,     DEEPTHI Dueñas   [facility] [place] [facility]  [phone]

## 2024-01-23 ENCOUNTER — APPOINTMENT (OUTPATIENT)
Dept: ADMISSIONS | Facility: MEDICAL CENTER | Age: 69
End: 2024-01-23
Attending: INTERNAL MEDICINE
Payer: MEDICARE

## 2024-01-30 ENCOUNTER — PRE-ADMISSION TESTING (OUTPATIENT)
Dept: ADMISSIONS | Facility: MEDICAL CENTER | Age: 69
End: 2024-01-30
Attending: INTERNAL MEDICINE
Payer: MEDICARE

## 2024-02-02 ENCOUNTER — HOSPITAL ENCOUNTER (OUTPATIENT)
Facility: MEDICAL CENTER | Age: 69
End: 2024-02-02
Attending: SURGERY | Admitting: SURGERY
Payer: MEDICARE

## 2024-02-08 ENCOUNTER — APPOINTMENT (OUTPATIENT)
Dept: ADMISSIONS | Facility: MEDICAL CENTER | Age: 69
End: 2024-02-08
Attending: SURGERY
Payer: MEDICARE

## 2024-02-16 ENCOUNTER — PRE-ADMISSION TESTING (OUTPATIENT)
Dept: ADMISSIONS | Facility: MEDICAL CENTER | Age: 69
End: 2024-02-16
Attending: SURGERY
Payer: MEDICARE

## 2024-02-16 ENCOUNTER — HOSPITAL ENCOUNTER (OUTPATIENT)
Dept: LAB | Facility: MEDICAL CENTER | Age: 69
End: 2024-02-16
Attending: SURGERY
Payer: MEDICARE

## 2024-02-16 LAB
25(OH)D3 SERPL-MCNC: 40 NG/ML (ref 30–100)
ALBUMIN SERPL BCP-MCNC: 4 G/DL (ref 3.2–4.9)
ALBUMIN/GLOB SERPL: 1.7 G/DL
ALP SERPL-CCNC: 94 U/L (ref 30–99)
ALT SERPL-CCNC: 21 U/L (ref 2–50)
ANION GAP SERPL CALC-SCNC: 10 MMOL/L (ref 7–16)
APPEARANCE UR: CLEAR
AST SERPL-CCNC: 16 U/L (ref 12–45)
BASOPHILS # BLD AUTO: 0.7 % (ref 0–1.8)
BASOPHILS # BLD: 0.04 K/UL (ref 0–0.12)
BILIRUB SERPL-MCNC: 0.3 MG/DL (ref 0.1–1.5)
BILIRUB UR QL STRIP.AUTO: NEGATIVE
BUN SERPL-MCNC: 45 MG/DL (ref 8–22)
CALCIUM ALBUM COR SERPL-MCNC: 9.3 MG/DL (ref 8.5–10.5)
CALCIUM SERPL-MCNC: 9.3 MG/DL (ref 8.5–10.5)
CHLORIDE SERPL-SCNC: 108 MMOL/L (ref 96–112)
CHOLEST SERPL-MCNC: 125 MG/DL (ref 100–199)
CO2 SERPL-SCNC: 24 MMOL/L (ref 20–33)
COLOR UR: YELLOW
CREAT SERPL-MCNC: 1.96 MG/DL (ref 0.5–1.4)
CREAT UR-MCNC: 64.84 MG/DL
CREAT UR-MCNC: 67.09 MG/DL
EOSINOPHIL # BLD AUTO: 0.1 K/UL (ref 0–0.51)
EOSINOPHIL NFR BLD: 1.8 % (ref 0–6.9)
ERYTHROCYTE [DISTWIDTH] IN BLOOD BY AUTOMATED COUNT: 47.9 FL (ref 35.9–50)
EST. AVERAGE GLUCOSE BLD GHB EST-MCNC: 151 MG/DL
FASTING STATUS PATIENT QL REPORTED: NORMAL
GFR SERPLBLD CREATININE-BSD FMLA CKD-EPI: 37 ML/MIN/1.73 M 2
GLOBULIN SER CALC-MCNC: 2.4 G/DL (ref 1.9–3.5)
GLUCOSE SERPL-MCNC: 113 MG/DL (ref 65–99)
GLUCOSE UR STRIP.AUTO-MCNC: NEGATIVE MG/DL
HBA1C MFR BLD: 6.9 % (ref 4–5.6)
HCT VFR BLD AUTO: 44.1 % (ref 42–52)
HDLC SERPL-MCNC: 53 MG/DL
HGB BLD-MCNC: 13.7 G/DL (ref 14–18)
IMM GRANULOCYTES # BLD AUTO: 0.05 K/UL (ref 0–0.11)
IMM GRANULOCYTES NFR BLD AUTO: 0.9 % (ref 0–0.9)
KETONES UR STRIP.AUTO-MCNC: NEGATIVE MG/DL
LDLC SERPL CALC-MCNC: 47 MG/DL
LEUKOCYTE ESTERASE UR QL STRIP.AUTO: NEGATIVE
LYMPHOCYTES # BLD AUTO: 0.98 K/UL (ref 1–4.8)
LYMPHOCYTES NFR BLD: 17.8 % (ref 22–41)
MAGNESIUM SERPL-MCNC: 1.9 MG/DL (ref 1.5–2.5)
MCH RBC QN AUTO: 28.4 PG (ref 27–33)
MCHC RBC AUTO-ENTMCNC: 31.1 G/DL (ref 32.3–36.5)
MCV RBC AUTO: 91.5 FL (ref 81.4–97.8)
MICRO URNS: NORMAL
MICROALBUMIN UR-MCNC: <1.2 MG/DL
MICROALBUMIN/CREAT UR: NORMAL MG/G (ref 0–30)
MONOCYTES # BLD AUTO: 0.63 K/UL (ref 0–0.85)
MONOCYTES NFR BLD AUTO: 11.4 % (ref 0–13.4)
NEUTROPHILS # BLD AUTO: 3.71 K/UL (ref 1.82–7.42)
NEUTROPHILS NFR BLD: 67.4 % (ref 44–72)
NITRITE UR QL STRIP.AUTO: NEGATIVE
NRBC # BLD AUTO: 0 K/UL
NRBC BLD-RTO: 0 /100 WBC (ref 0–0.2)
PH UR STRIP.AUTO: 5.5 [PH] (ref 5–8)
PHOSPHATE SERPL-MCNC: 3.2 MG/DL (ref 2.5–4.5)
PLATELET # BLD AUTO: 122 K/UL (ref 164–446)
PMV BLD AUTO: 12.5 FL (ref 9–12.9)
POTASSIUM SERPL-SCNC: 5 MMOL/L (ref 3.6–5.5)
PROT SERPL-MCNC: 6.4 G/DL (ref 6–8.2)
PROT UR QL STRIP: NEGATIVE MG/DL
PROT UR-MCNC: 9 MG/DL (ref 0–15)
PROT/CREAT UR: 139 MG/G (ref 15–68)
RBC # BLD AUTO: 4.82 M/UL (ref 4.7–6.1)
RBC UR QL AUTO: NEGATIVE
SODIUM SERPL-SCNC: 142 MMOL/L (ref 135–145)
SP GR UR STRIP.AUTO: 1.02
TRIGL SERPL-MCNC: 127 MG/DL (ref 0–149)
URATE SERPL-MCNC: 7.9 MG/DL (ref 2.5–8.3)
UROBILINOGEN UR STRIP.AUTO-MCNC: 0.2 MG/DL
WBC # BLD AUTO: 5.5 K/UL (ref 4.8–10.8)

## 2024-02-16 PROCEDURE — 80197 ASSAY OF TACROLIMUS: CPT

## 2024-02-16 PROCEDURE — 82043 UR ALBUMIN QUANTITATIVE: CPT

## 2024-02-16 PROCEDURE — 84100 ASSAY OF PHOSPHORUS: CPT

## 2024-02-16 PROCEDURE — 81003 URINALYSIS AUTO W/O SCOPE: CPT

## 2024-02-16 PROCEDURE — 82306 VITAMIN D 25 HYDROXY: CPT

## 2024-02-16 PROCEDURE — 84550 ASSAY OF BLOOD/URIC ACID: CPT

## 2024-02-16 PROCEDURE — 80061 LIPID PANEL: CPT

## 2024-02-16 PROCEDURE — 80053 COMPREHEN METABOLIC PANEL: CPT

## 2024-02-16 PROCEDURE — 82570 ASSAY OF URINE CREATININE: CPT

## 2024-02-16 PROCEDURE — 83036 HEMOGLOBIN GLYCOSYLATED A1C: CPT

## 2024-02-16 PROCEDURE — 84156 ASSAY OF PROTEIN URINE: CPT

## 2024-02-16 PROCEDURE — 85025 COMPLETE CBC W/AUTO DIFF WBC: CPT

## 2024-02-16 PROCEDURE — 83735 ASSAY OF MAGNESIUM: CPT

## 2024-02-16 PROCEDURE — 36415 COLL VENOUS BLD VENIPUNCTURE: CPT

## 2024-02-16 NOTE — OR NURSING
PAT RN interview for 2/29/24 encounter and 3/6/24 surgery completed. Specific encounter instructions reviewed including NPO status and medication instructions.

## 2024-02-19 ENCOUNTER — HOSPITAL ENCOUNTER (OUTPATIENT)
Dept: RADIOLOGY | Facility: MEDICAL CENTER | Age: 69
End: 2024-02-19
Attending: SURGERY
Payer: MEDICARE

## 2024-02-19 DIAGNOSIS — C44.629 SQUAMOUS CELL CARCINOMA OF SKIN OF LEFT UPPER LIMB, INCLUDING SHOULDER: ICD-10-CM

## 2024-02-19 LAB — TACROLIMUS BLD-MCNC: 10.4 NG/ML (ref 5–20)

## 2024-02-19 PROCEDURE — 73201 CT UPPER EXTREMITY W/DYE: CPT | Mod: LT

## 2024-02-19 PROCEDURE — 700117 HCHG RX CONTRAST REV CODE 255: Performed by: SURGERY

## 2024-02-19 RX ADMIN — IOHEXOL 100 ML: 350 INJECTION, SOLUTION INTRAVENOUS at 17:04

## 2024-02-19 RX ADMIN — IOHEXOL 65 ML: 350 INJECTION, SOLUTION INTRAVENOUS at 17:55

## 2024-02-23 ENCOUNTER — HOSPITAL ENCOUNTER (OUTPATIENT)
Dept: RADIOLOGY | Facility: MEDICAL CENTER | Age: 69
End: 2024-02-23
Attending: SURGERY
Payer: MEDICARE

## 2024-02-23 ENCOUNTER — OFFICE VISIT (OUTPATIENT)
Dept: MEDICAL GROUP | Facility: PHYSICIAN GROUP | Age: 69
End: 2024-02-23
Payer: MEDICARE

## 2024-02-23 VITALS
HEART RATE: 76 BPM | HEIGHT: 77 IN | SYSTOLIC BLOOD PRESSURE: 128 MMHG | TEMPERATURE: 97.8 F | RESPIRATION RATE: 18 BRPM | BODY MASS INDEX: 27.75 KG/M2 | DIASTOLIC BLOOD PRESSURE: 74 MMHG | WEIGHT: 235 LBS | OXYGEN SATURATION: 96 %

## 2024-02-23 DIAGNOSIS — E78.5 TYPE 2 DIABETES MELLITUS WITH HYPERLIPIDEMIA (HCC): ICD-10-CM

## 2024-02-23 DIAGNOSIS — R22.32 AXILLARY MASS, LEFT: ICD-10-CM

## 2024-02-23 DIAGNOSIS — I10 DIABETES MELLITUS WITH COINCIDENT HYPERTENSION (HCC): ICD-10-CM

## 2024-02-23 DIAGNOSIS — Z79.52 IMMUNOSUPPRESSION DUE TO CHRONIC STEROID USE (HCC): ICD-10-CM

## 2024-02-23 DIAGNOSIS — E27.49 SECONDARY ADRENAL INSUFFICIENCY (HCC): ICD-10-CM

## 2024-02-23 DIAGNOSIS — D48.19 NEOPLASM OF UNCERTAIN BEHAVIOR OF CONNECTIVE AND SOFT TISSUE OF NECK: ICD-10-CM

## 2024-02-23 DIAGNOSIS — T38.0X5A IMMUNOSUPPRESSION DUE TO CHRONIC STEROID USE (HCC): ICD-10-CM

## 2024-02-23 DIAGNOSIS — D84.9 IMMUNOSUPPRESSED STATUS (HCC): ICD-10-CM

## 2024-02-23 DIAGNOSIS — E11.69 TYPE 2 DIABETES MELLITUS WITH HYPERLIPIDEMIA (HCC): ICD-10-CM

## 2024-02-23 DIAGNOSIS — E11.21 TYPE 2 DIABETES MELLITUS WITH DIABETIC NEPHROPATHY, WITHOUT LONG-TERM CURRENT USE OF INSULIN (HCC): ICD-10-CM

## 2024-02-23 DIAGNOSIS — I70.0 ATHEROSCLEROSIS OF AORTA (HCC): ICD-10-CM

## 2024-02-23 DIAGNOSIS — E11.9 DIABETES MELLITUS WITH COINCIDENT HYPERTENSION (HCC): ICD-10-CM

## 2024-02-23 DIAGNOSIS — D84.821 IMMUNOSUPPRESSION DUE TO CHRONIC STEROID USE (HCC): ICD-10-CM

## 2024-02-23 DIAGNOSIS — E11.40 TYPE 2 DIABETES MELLITUS WITH DIABETIC NEUROPATHY, WITHOUT LONG-TERM CURRENT USE OF INSULIN (HCC): ICD-10-CM

## 2024-02-23 DIAGNOSIS — N18.32 STAGE 3B CHRONIC KIDNEY DISEASE: ICD-10-CM

## 2024-02-23 DIAGNOSIS — I71.40 ABDOMINAL AORTIC ANEURYSM (AAA) WITHOUT RUPTURE, UNSPECIFIED PART (HCC): ICD-10-CM

## 2024-02-23 DIAGNOSIS — F51.01 PRIMARY INSOMNIA: ICD-10-CM

## 2024-02-23 LAB
GRAM STN SPEC: NORMAL
PATHOLOGY CONSULT NOTE: NORMAL
SIGNIFICANT IND 70042: NORMAL
SITE SITE: NORMAL
SOURCE SOURCE: NORMAL

## 2024-02-23 PROCEDURE — 88342 IMHCHEM/IMCYTCHM 1ST ANTB: CPT

## 2024-02-23 PROCEDURE — 3078F DIAST BP <80 MM HG: CPT | Performed by: FAMILY MEDICINE

## 2024-02-23 PROCEDURE — 3074F SYST BP LT 130 MM HG: CPT | Performed by: FAMILY MEDICINE

## 2024-02-23 PROCEDURE — 87070 CULTURE OTHR SPECIMN AEROBIC: CPT

## 2024-02-23 PROCEDURE — 87205 SMEAR GRAM STAIN: CPT

## 2024-02-23 PROCEDURE — 88305 TISSUE EXAM BY PATHOLOGIST: CPT | Mod: 59

## 2024-02-23 PROCEDURE — 88341 IMHCHEM/IMCYTCHM EA ADD ANTB: CPT

## 2024-02-23 PROCEDURE — 4401636 IR-NEEDLE BX-LYMPH NODE

## 2024-02-23 PROCEDURE — 99214 OFFICE O/P EST MOD 30 MIN: CPT | Performed by: FAMILY MEDICINE

## 2024-02-23 PROCEDURE — 88112 CYTOPATH CELL ENHANCE TECH: CPT

## 2024-02-23 RX ORDER — TRAZODONE HYDROCHLORIDE 50 MG/1
50 TABLET ORAL NIGHTLY
Qty: 30 TABLET | Refills: 3 | Status: SHIPPED | OUTPATIENT
Start: 2024-02-23 | End: 2024-03-06

## 2024-02-23 ASSESSMENT — FIBROSIS 4 INDEX: FIB4 SCORE: 1.92

## 2024-02-23 NOTE — ASSESSMENT & PLAN NOTE
This is a new issue.  He did just recently have it drained and it is feeling better.  Surgery is planned to remove it to find out what it is.  He states the surgeon did do a biopsy on it as well.

## 2024-02-23 NOTE — ASSESSMENT & PLAN NOTE
This is a chronic problem.  Being followed by his other physician.  His diabetes is under good control.  Kidney tests are mildly abnormal but seems stable.

## 2024-02-23 NOTE — PROGRESS NOTES
Subjective:     CC: Here for several issues.    HPI:   Jama presents today with the following medical concerns:    Primary insomnia  This is a new problem.  Patient states for last months he has had increased pain and difficulty with the swelling in his left axilla.  It is made it difficult to sleep and is asking for something to help with that.  He did just see a surgeon and had about 5 syringes of fluid drained from it.  He states he feels much better.  He has a pending surgery to remove it in the near future.    Type 2 diabetes mellitus with kidney complication, without long-term current use of insulin (HCC)  This is a chronic problem.  Being followed by his other physician.  His diabetes is under good control.  Kidney tests are mildly abnormal but seems stable.    Type 2 diabetes mellitus with hyperlipidemia (HCC)  As well as chronic problems under good control.  Continue to follow.    Type 2 diabetes mellitus with diabetic neuropathy, unspecified (HCC)  This is a chronic stable condition.    Stage 3b chronic kidney disease (HCC)  This is a chronic stable condition.  Last GFR was slightly lower but his creatinine was better.  Continue to follow and avoid NSAIDs.    Secondary adrenal insufficiency (HCC)  Chronic stable condition.  Followed by his other specialist.    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  Followed by his nephrologist.    Immunosuppressed status (HCC)  Chronic problem.  Followed by his nephrologist.    Diabetes mellitus with coincident hypertension (HCC)  This is a chronic problem under good control.    Atherosclerosis of aorta (HCC)  Chronic stable condition.  Noted on x-ray studies.    Abdominal aortic aneurysm (AAA) without rupture (HCC)  Chronic problem.  I discussed with patient.  His last measurement showed it at 3.2 cm.  He wants to wait till his other issues are addressed and we will order this at his next visit in 3 months.    Axillary mass, left  This is a  new issue.  He did just recently have it drained and it is feeling better.  Surgery is planned to remove it to find out what it is.  He states the surgeon did do a biopsy on it as well.    Past Medical History:   Diagnosis Date    A-fib (HCC)     Arrhythmia     Benign essential hypertension     Diabetes (HCC)     type 2    Heart burn     Hemorrhagic disorder (HCC)     Eliquis    Hyperlipoproteinemia     Hypertension     not on meds anymore    Indigestion     Myocardial infarct (HCC) 2013    stent    Polycystic kidney 09/10/2010    RIGHT KIDNEY TRANSPLANT    Sleep apnea 01/30/2024    states he was told he had sleep apnea but has not had a sleep study    Snoring        Social History     Tobacco Use    Smoking status: Never     Passive exposure: Never    Smokeless tobacco: Never   Vaping Use    Vaping Use: Never used   Substance Use Topics    Alcohol use: No    Drug use: No       Current Outpatient Medications Ordered in Epic   Medication Sig Dispense Refill    traZODone (DESYREL) 50 MG Tab Take 1 Tablet by mouth every evening. 30 Tablet 3    apixaban (ELIQUIS) 5mg Tab Take 1 Tablet by mouth 2 times a day. 180 Tablet 2    tadalafil (CIALIS) 5 MG tablet : TAKE 1 TABLETS 30 MINUTES BEFORE SEXUAL ACTIVITY, DO NOT EXCEED MORE THAN ONE DOSE A DAY 15 Tablet 3    aspirin 81 MG EC tablet Take 1 Tablet by mouth every day. 100 Tablet 1    metoprolol SR (TOPROL XL) 25 MG TABLET SR 24 HR Take 1 Tablet by mouth every day. 90 Tablet 3    pioglitazone (ACTOS) 45 MG Tab TAKE ONE TABLET BY MOUTH ONCE DAILY 100 Tablet 3    gabapentin (NEURONTIN) 300 MG Cap TAKE 2 CAPSULES BY MOUTH TWICE DAILY 360 Capsule 1    linagliptin (TRADJENTA) 5 MG Tab tablet Take 1 Tablet by mouth every day. 100 Tablet 3    omeprazole (PRILOSEC) 20 MG delayed-release capsule Take 1 Capsule by mouth every day. 90 Capsule 3    mycophenolate (CELLCEPT) 500 MG tablet Take 500 mg by mouth 2 times a day.      glyBURIDE (DIABETA) 5 MG Tab Take 2 Tablets by mouth 2  "times a day with meals. 400 Tablet 3    predniSONE (DELTASONE) 5 MG Tab Take 1 Tablet by mouth every day. 30 Tablet 0    tacrolimus (PROGRAF) 1 MG Cap Take 1 Capsule by mouth 2 times a day. (Patient taking differently: Take 1 mg by mouth 2 times a day. 0800  2000) 60 Capsule 3     No current Epic-ordered facility-administered medications on file.       Allergies:  Doxycycline    Health Maintenance: Completed    ROS:  Gen: no fevers/chills, no changes in weight  Eyes: no changes in vision  ENT: no sore throat, no hearing loss, no bloody nose  Pulm: no sob, no cough  CV: no chest pain, no palpitations  GI: no nausea/vomiting, no diarrhea  : no dysuria  Skin: no rash  Neuro: no headaches, no numbness/tingling  Heme/Lymph: no easy bruising      Objective:       Exam:  /74 (BP Location: Left arm, Patient Position: Sitting, BP Cuff Size: Adult)   Pulse 76   Temp 36.6 °C (97.8 °F) (Temporal)   Resp 18   Ht 1.956 m (6' 5\")   Wt 107 kg (235 lb)   SpO2 96%   BMI 27.87 kg/m²  Body mass index is 27.87 kg/m².    Gen: Alert and oriented, No apparent distress.  Neck: Neck is supple without lymphadenopathy.  Lungs: Normal effort,   Ext: No clubbing, cyanosis, edema.  Psych: Patient is alert and cooperative.  No unusual thought Mark expressed.  Insight and judgment is good.    Labs: Labs by his nephrologist reviewed with patient.    Assessment & Plan:     68 y.o. male with the following -     1. Primary insomnia  This is a new issue.  Will try him on trazodone first.  He is told to try 1 and if in a couple of days he is not sleeping he could increase it to 2.  Then he is to let me know how he is doing next week.    2. Abdominal aortic aneurysm (AAA) without rupture, unspecified part (HCC)  This is a chronic problem.  Next visit we will order his follow-up CAT scan.    3. Atherosclerosis of aorta (HCC)  This is a chronic problem.  Continue to follow.    4. Stage 3b chronic kidney disease (HCC)  This is a chronic " problem.  Continue care through nephrology.    5. Type 2 diabetes mellitus with hyperlipidemia (HCC)  This is a chronic problem under good control.  Continue to follow.    6. Type 2 diabetes mellitus with diabetic neuropathy, without long-term current use of insulin (HCC)  This is a chronic stable condition.    7. Type 2 diabetes mellitus with diabetic nephropathy, without long-term current use of insulin (HCC)  This is a chronic stable condition.    8. Diabetes mellitus with coincident hypertension (HCC)  This is a chronic problem.  Both issues are under good control.    9. Secondary adrenal insufficiency (HCC)  This is a chronic problem.  Followed by nephrology.    10. 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  Followed by nephrology.    11. Immunosuppressed status (HCC)  This is a chronic problem.  Followed by nephrology.    12. Axillary mass, left  This is a new issue.  Followed by surgery.    HCC Gap Form    Diagnosis to address: I71.40 - Abdominal aortic aneurysm (AAA) without rupture, unspecified part ([facility])  Assessment and plan: Chronic, stable. Continue with current defined treatment plan: Chronic problem list being followed.. Follow-up at least annually.  Diagnosis: I70.0 - Atherosclerosis of aorta (HCC)  Assessment and plan: Chronic, stable. Continue with current defined treatment plan: On a healthy diet.. Follow-up at least annually.  Diagnosis: N18.32 - Stage 3b chronic kidney disease (HCC)  Assessment and plan: Chronic, stable. Continue with current defined treatment plan: Chronic stable condition.. Follow-up at least annually.  Diagnosis: E11.69, E78.5 - Type 2 diabetes mellitus with hyperlipidemia (HCC)  Assessment and plan: Chronic, stable. Continue with current defined treatment plan: Well-controlled diagnoses.. Follow-up at least annually.  Diagnosis: E11.40 - Type 2 diabetes mellitus with diabetic neuropathy, without long-term current use of insulin  (HCC)  Assessment and plan: Chronic, stable. Continue with current defined treatment plan: Stable condition.. Follow-up at least annually.  Diagnosis: E11.21 - Type 2 diabetes mellitus with diabetic nephropathy, without long-term current use of insulin (HCC)  Assessment and plan: Chronic, stable. Continue with current defined treatment plan: Stable condition.. Follow-up at least annually.  Diagnosis: E11.9, I10 - Diabetes mellitus with coincident hypertension (HCC)  Assessment and plan: Chronic, stable. Continue with current defined treatment plan: Stable condition.. Follow-up at least annually.  Diagnosis: E27.49 - Secondary adrenal insufficiency (HCC)  Assessment and plan: Chronic, stable. Continue with current defined treatment plan: Stable condition.. Follow-up at least annually.  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 Continue with current treatment plan: By another physician.  Follow-up with specialist as directed, but at least annually.  Diagnosis: D84.9 - Immunosuppressed status (HCC)  Assessment and plan: Chronic, stable, as based on today's assessment and impact on other conditions evaluated today. Continue with current treatment plan: Managed by others.  Follow-up with specialist as directed, but at least annually.  Last edited 02/23/24 14:28 PST by Ganesh Benton III, M.D.         Return in about 3 months (around 5/23/2024) for Long.    Please note that this dictation was created using voice recognition software. I have made every reasonable attempt to correct obvious errors, but I expect that there are errors of grammar and possibly content that I did not discover before finalizing the note.

## 2024-02-23 NOTE — PROGRESS NOTES
US guided left axillary core biopsy and aspiration of mass done by Dr. Nicolas; NON-SEDATION (no H&P required as this is NON SEDATION procedure)  access site, dressing CDI; Cores  x3  in 1 jar Formalin. Cores 1 in RPMI obtained and sent to lab. 280 ml of red tinged liquid aspirated from site. Pt tolerated the procedure well. Pt hemodynamically stable pre/intra/post procedure; all questions and concerns answered prior to being d/c; patient provided with appropriate education for procedure; pt d/c home.

## 2024-02-23 NOTE — ASSESSMENT & PLAN NOTE
This is a new problem.  Patient states for last months he has had increased pain and difficulty with the swelling in his left axilla.  It is made it difficult to sleep and is asking for something to help with that.  He did just see a surgeon and had about 5 syringes of fluid drained from it.  He states he feels much better.  He has a pending surgery to remove it in the near future.

## 2024-02-23 NOTE — ASSESSMENT & PLAN NOTE
Chronic problem.  I discussed with patient.  His last measurement showed it at 3.2 cm.  He wants to wait till his other issues are addressed and we will order this at his next visit in 3 months.

## 2024-02-23 NOTE — ASSESSMENT & PLAN NOTE
This is a chronic stable condition.  Last GFR was slightly lower but his creatinine was better.  Continue to follow and avoid NSAIDs.

## 2024-02-27 LAB
BACTERIA WND AEROBE CULT: NORMAL
GRAM STN SPEC: NORMAL
SIGNIFICANT IND 70042: NORMAL
SITE SITE: NORMAL
SOURCE SOURCE: NORMAL

## 2024-02-29 ENCOUNTER — ANESTHESIA EVENT (OUTPATIENT)
Dept: CARDIOLOGY | Facility: MEDICAL CENTER | Age: 69
End: 2024-02-29
Payer: MEDICARE

## 2024-02-29 ENCOUNTER — APPOINTMENT (OUTPATIENT)
Dept: CARDIOLOGY | Facility: MEDICAL CENTER | Age: 69
End: 2024-02-29
Attending: INTERNAL MEDICINE
Payer: MEDICARE

## 2024-02-29 ENCOUNTER — HOSPITAL ENCOUNTER (OUTPATIENT)
Facility: MEDICAL CENTER | Age: 69
End: 2024-02-29
Attending: INTERNAL MEDICINE | Admitting: INTERNAL MEDICINE
Payer: MEDICARE

## 2024-02-29 ENCOUNTER — ANESTHESIA (OUTPATIENT)
Dept: CARDIOLOGY | Facility: MEDICAL CENTER | Age: 69
End: 2024-02-29
Payer: MEDICARE

## 2024-02-29 VITALS
WEIGHT: 244.93 LBS | HEIGHT: 78 IN | DIASTOLIC BLOOD PRESSURE: 105 MMHG | HEART RATE: 82 BPM | RESPIRATION RATE: 18 BRPM | TEMPERATURE: 97.8 F | SYSTOLIC BLOOD PRESSURE: 149 MMHG | OXYGEN SATURATION: 95 % | BODY MASS INDEX: 28.34 KG/M2

## 2024-02-29 DIAGNOSIS — I48.0 PAF (PAROXYSMAL ATRIAL FIBRILLATION) (HCC): ICD-10-CM

## 2024-02-29 PROBLEM — Z95.818 PRESENCE OF WATCHMAN LEFT ATRIAL APPENDAGE CLOSURE DEVICE: Status: ACTIVE | Noted: 2024-02-29

## 2024-02-29 LAB — GLUCOSE BLD STRIP.AUTO-MCNC: 124 MG/DL (ref 65–99)

## 2024-02-29 PROCEDURE — 82962 GLUCOSE BLOOD TEST: CPT

## 2024-02-29 PROCEDURE — 160035 HCHG PACU - 1ST 60 MINS PHASE I

## 2024-02-29 PROCEDURE — 93325 DOPPLER ECHO COLOR FLOW MAPG: CPT

## 2024-02-29 PROCEDURE — 700105 HCHG RX REV CODE 258: Performed by: INTERNAL MEDICINE

## 2024-02-29 PROCEDURE — 160002 HCHG RECOVERY MINUTES (STAT)

## 2024-02-29 PROCEDURE — 93312 ECHO TRANSESOPHAGEAL: CPT | Mod: 26 | Performed by: INTERNAL MEDICINE

## 2024-02-29 PROCEDURE — 700111 HCHG RX REV CODE 636 W/ 250 OVERRIDE (IP): Performed by: ANESTHESIOLOGY

## 2024-02-29 RX ORDER — LABETALOL HYDROCHLORIDE 5 MG/ML
5 INJECTION, SOLUTION INTRAVENOUS
Status: DISCONTINUED | OUTPATIENT
Start: 2024-02-29 | End: 2024-02-29 | Stop reason: HOSPADM

## 2024-02-29 RX ORDER — DIPHENHYDRAMINE HYDROCHLORIDE 50 MG/ML
12.5 INJECTION INTRAMUSCULAR; INTRAVENOUS
Status: DISCONTINUED | OUTPATIENT
Start: 2024-02-29 | End: 2024-02-29 | Stop reason: HOSPADM

## 2024-02-29 RX ORDER — HALOPERIDOL 5 MG/ML
1 INJECTION INTRAMUSCULAR
Status: DISCONTINUED | OUTPATIENT
Start: 2024-02-29 | End: 2024-02-29 | Stop reason: HOSPADM

## 2024-02-29 RX ORDER — SODIUM CHLORIDE, SODIUM LACTATE, POTASSIUM CHLORIDE, CALCIUM CHLORIDE 600; 310; 30; 20 MG/100ML; MG/100ML; MG/100ML; MG/100ML
INJECTION, SOLUTION INTRAVENOUS CONTINUOUS
Status: DISCONTINUED | OUTPATIENT
Start: 2024-02-29 | End: 2024-02-29 | Stop reason: HOSPADM

## 2024-02-29 RX ORDER — HYDRALAZINE HYDROCHLORIDE 20 MG/ML
5 INJECTION INTRAMUSCULAR; INTRAVENOUS
Status: DISCONTINUED | OUTPATIENT
Start: 2024-02-29 | End: 2024-02-29 | Stop reason: HOSPADM

## 2024-02-29 RX ORDER — ONDANSETRON 2 MG/ML
4 INJECTION INTRAMUSCULAR; INTRAVENOUS
Status: DISCONTINUED | OUTPATIENT
Start: 2024-02-29 | End: 2024-02-29 | Stop reason: HOSPADM

## 2024-02-29 RX ORDER — LIDOCAINE HYDROCHLORIDE 10 MG/ML
INJECTION, SOLUTION EPIDURAL; INFILTRATION; INTRACAUDAL; PERINEURAL PRN
Status: DISCONTINUED | OUTPATIENT
Start: 2024-02-29 | End: 2024-02-29 | Stop reason: SURG

## 2024-02-29 RX ORDER — EPHEDRINE SULFATE 50 MG/ML
5 INJECTION, SOLUTION INTRAVENOUS
Status: DISCONTINUED | OUTPATIENT
Start: 2024-02-29 | End: 2024-02-29 | Stop reason: HOSPADM

## 2024-02-29 RX ADMIN — PROPOFOL 100 MG: 10 INJECTION, EMULSION INTRAVENOUS at 11:08

## 2024-02-29 RX ADMIN — LIDOCAINE HYDROCHLORIDE 100 MG: 10 INJECTION, SOLUTION EPIDURAL; INFILTRATION; INTRACAUDAL; PERINEURAL at 11:08

## 2024-02-29 RX ADMIN — SODIUM CHLORIDE, POTASSIUM CHLORIDE, SODIUM LACTATE AND CALCIUM CHLORIDE: 600; 310; 30; 20 INJECTION, SOLUTION INTRAVENOUS at 10:59

## 2024-02-29 ASSESSMENT — FIBROSIS 4 INDEX: FIB4 SCORE: 1.92

## 2024-02-29 ASSESSMENT — PAIN DESCRIPTION - PAIN TYPE
TYPE: ACUTE PAIN

## 2024-02-29 NOTE — DISCHARGE INSTRUCTIONS
HOME CARE INSTRUCTIONS    RADHA - TRANSESOPHAGEAL ECHOCARDIOGRAM  1. Don't drive or drink alcohol for 24 hours. The medication you received will make you too drowsy.  2. If you begin to vomit bloody material, or develop black or bloody stools, call your doctor as soon as possible.  3. If you have any neck, chest, abdominal pain or temp of 100 degrees, call your doctor.    You should call 911 if you develop problems with breathing or chest pain.  If any questions arise, call your doctor.   I acknowledge receipt and understanding of these Home Care Instructions.    Transesophageal Echocardiogram  Transesophageal echocardiogram (RADHA) is a test that uses sound waves to take pictures of your heart. RADHA is done by passing a small probe attached to a flexible tube down the part of the body that moves food from your mouth to your stomach (esophagus). The pictures give clear images of your heart. This can help your doctor see if there are problems with your heart.  Tell a doctor about:  Any allergies you have.  All medicines you are taking. This includes vitamins, herbs, eye drops, creams, and over-the-counter medicines.  Any problems you or family members have had with anesthetic medicines.  Any blood disorders you have.  Any surgeries you have had.  Any medical conditions you have.  Any swallowing problems.  Whether you have or have had a blockage in the part of the body that moves food from your mouth to your stomach.  Whether you are pregnant or may be pregnant.  What are the risks?  In general, this is a safe procedure. But, problems may occur, such as:  Damage to nearby structures or organs.  A tear in the part of the body that moves food from your mouth to your stomach.  Irregular heartbeat.  Hoarse voice or trouble swallowing.  Bleeding.  What happens before the procedure?  Medicines  Ask your doctor about changing or stopping:  Your normal medicines.  Vitamins, herbs, and supplements.  Over-the-counter medicines.  Do  not take aspirin or ibuprofen unless you are told to.  General instructions  Follow instructions from your doctor about what you cannot eat or drink.  You will take out any dentures or dental retainers.  Plan to have a responsible adult take you home from the hospital or clinic.  Plan to have a responsible adult care for you for the time you are told after you leave the hospital or clinic. This is important.  What happens during the procedure?    An IV will be put into one of your veins.  You may be given:  A sedative. This medicine helps you relax.  A medicine to numb the back of your throat. This may be sprayed or gargled.  Your blood pressure, heart rate, and breathing will be watched.  You may be asked to lie on your left side.  A bite block will be placed in your mouth. This keeps you from biting the tube.  The tip of the probe will be placed into the back of your mouth.  You will be asked to swallow.  Your doctor will take pictures of your heart.  The probe and bite block will be taken out after the test is done.  The procedure may vary among doctors and hospitals.  What can I expect after the procedure?  You will be monitored until you leave the hospital or clinic. This includes checking your blood pressure, heart rate, breathing rate, and blood oxygen level.  Your throat may feel sore and numb. This will get better over time. You will not be allowed to eat or drink until the numbness has gone away.  It is common to have a sore throat for a day or two.  It is up to you to get the results of your procedure. Ask how to get your results when they are ready.  Follow these instructions at home:  If you were given a sedative during your procedure, do not drive or use machines until your doctor says that it is safe.  Return to your normal activities when your doctor says that it is safe.  Keep all follow-up visits.  Summary  RADHA is a test that uses sound waves to take pictures of your heart.  You will be given a  medicine to help you relax.  Do not drive or use machines until your doctor says that it is safe.  This information is not intended to replace advice given to you by your health care provider. Make sure you discuss any questions you have with your health care provider.  Document Revised: 08/31/2022 Document Reviewed: 08/10/2021  Elsevier Patient Education © 2023 [employer]

## 2024-02-29 NOTE — ANESTHESIA POSTPROCEDURE EVALUATION
Patient: Jama Altman    Procedure Summary       Date: 02/29/24 Room / Location: [facility] - Echocardiology [facility]    Anesthesia Start: 1059 Anesthesia Stop: 1120    Procedure: EC-RADHA W/O CONT Diagnosis:       PAF (paroxysmal atrial fibrillation) (HCC)      Paroxysmal atrial fibrillation    Scheduled Providers: Jaspreet Mosqueda M.D.; Elyssa Forman M.D. Responsible Provider: Elyssa Forman M.D.    Anesthesia Type: MAC ASA Status: 3            Final Anesthesia Type: MAC  Last vitals  BP   Blood Pressure : (!) 149/105    Temp   36.6 °C (97.8 °F)    Pulse   82   Resp   18    SpO2   95 %      Anesthesia Post Evaluation    Patient location during evaluation: PACU  Patient participation: complete - patient participated  Level of consciousness: awake and alert    Airway patency: patent  Anesthetic complications: no  Cardiovascular status: hemodynamically stable  Respiratory status: acceptable  Hydration status: euvolemic    PONV: none          No notable events documented.     Nurse Pain Score: 0 (NPRS)

## 2024-02-29 NOTE — OR NURSING
Pt A&Ox4. VSS on RA. Pt denies pain and nausea and is tolerating water.   D/c instructions reviewed and all questions answered.

## 2024-02-29 NOTE — PROCEDURES
DOS: 2/29/2024    Procedure performed: Transesophageal echocardiogram.    Patient was brought to procedure room 3.    Risks and benefits of transesophageal echocardiogram were explained to patient.  Patient showed good understanding.  Risks including esophageal perforation, death, bleeding, infection were clearly conveyed to patient.  Patient is willing to accept the risks and proceed with procedure.    Moderate sedation or Anesthesia service was used for sedation process.    Indication: to assess for watchman placement.    Complication: none    Diagnosis: the watchman device is well seated within the left atrial appendate. No evidence of leakage.      Complications: none.      Electronically Signed by: Jaspreet Mosqueda M.D.    2/29/2024  11:11 AM

## 2024-02-29 NOTE — ANESTHESIA PREPROCEDURE EVALUATION
Date/Time: 02/29/24 1100    Scheduled providers: Jaspreet Mosqueda M.D.; Elyssa Forman M.D.    Procedure: EC-RADHA W/O CONT    Diagnosis:       PAF (paroxysmal atrial fibrillation) (HCC) [I48.0]      Paroxysmal atrial fibrillation [I48.0]    Location: [facility] - Echocardiology [facility]            Relevant Problems   CARDIAC   (positive) Abdominal aortic aneurysm (AAA) without rupture (HCC)   (positive) Acute deep vein thrombosis (DVT) of left upper extremity (HCC)   (positive) Atherosclerosis of aorta (HCC)   (positive) Benign essential hypertension   (positive) CAD (coronary artery disease)   (positive) Old myocardial infarction   (positive) PAF (paroxysmal atrial fibrillation) (HCC)      GI   (positive) GERD (gastroesophageal reflux disease)         (positive) Hypertensive heart and chronic kidney disease with heart failure and stage 1 through stage 4 chronic kidney disease, or unspecified chronic kidney disease (HCC)   (positive) Polycystic kidney   (positive) Stage 3b chronic kidney disease (HCC)      ENDO   (positive) Type 2 diabetes mellitus with hyperlipidemia (HCC)   (positive) Type 2 diabetes mellitus with kidney complication, without long-term current use of insulin (HCC)      Other   (positive) History of MI (myocardial infarction)   (positive) History of renal transplant   (positive) S/P insertion of non-drug eluting coronary artery stent       Physical Exam    Airway   Mallampati: II  TM distance: >3 FB  Neck ROM: full       Cardiovascular - normal exam  Rhythm: regular  Rate: normal  (-) murmur     Dental - normal exam           Pulmonary - normal exam  Breath sounds clear to auscultation     Abdominal    Neurological - normal exam                   Anesthesia Plan    ASA 3   ASA physical status 3 criteria: MI or angina - history (> 3 months)    Plan - MAC               Induction: intravenous      Pertinent diagnostic labs and testing reviewed    Informed  Consent:    Anesthetic plan and risks discussed with patient.    Use of blood products discussed with: patient whom consented to blood products.

## 2024-02-29 NOTE — ANESTHESIA TIME REPORT
Anesthesia Start and Stop Event Times       Date Time Event    2/29/2024 1051 Ready for Procedure     1059 Anesthesia Start     1120 Anesthesia Stop          Responsible Staff  02/29/24      Name Role Begin End    Elyssa Forman M.D. Anesth 1059 1120          Overtime Reason:  no overtime (within assigned shift)    Comments:

## 2024-02-29 NOTE — H&P
Cardiology H and P Note:    Jaspreet Mosqueda M.D.  Date & Time note created:    2/29/2024   10:52 AM       Patient ID:   Name:             Jama Altman   YOB: 1955  Age:                 68 y.o.  male   MRN:               [id number]                                                             Chief Complaint / Reason for consult:  S/P Watchman.    History of Present Illness:    Patient is s/p watchman. Here is here for RADHA.    Review of Systems:      Constitutional: Denies fevers, Denies weight changes  Eyes: Denies changes in vision, no eye pain  Ears/Nose/Throat/Mouth: Denies nasal congestion or sore throat   Cardiovascular: no chest pain, no palpitations   Respiratory: no shortness of breath , Denies cough  Gastrointestinal/Hepatic: Denies abdominal pain, nausea, vomiting, diarrhea, constipation or GI bleeding   Genitourinary: Denies dysuria or frequency  Musculoskeletal/Rheum: Denies  joint pain and swelling   Skin: Denies rash  Neurological: Denies headache, confusion, memory loss or focal weakness/parasthesias  Psychiatric: denies mood disorder   Endocrine: Teresa thyroid problems  Heme/Oncology/Lymph Nodes: Denies enlarged lymph nodes, denies brusing or known bleeding disorder  All other systems were reviewed and are negative (AMA/CMS criteria)                Past Medical History:   Past Medical History:   Diagnosis Date    A-fib (HCC)     Arrhythmia     Benign essential hypertension     Diabetes (HCC)     type 2    Heart burn     Hemorrhagic disorder (HCC)     Eliquis    Hyperlipoproteinemia     Hypertension     not on meds anymore    Indigestion     Myocardial infarct (HCC) 2013    stent    Polycystic kidney 09/10/2010    RIGHT KIDNEY TRANSPLANT    Presence of Watchman left atrial appendage closure device 02/29/2024    Sleep apnea 01/30/2024    states he was told he had sleep apnea but has not had a sleep study    Snoring      Active Hospital Problems    Diagnosis     Presence of  Watchman left atrial appendage closure device [Z95.818]        Past Surgical History:  Past Surgical History:   Procedure Laterality Date    STENT PLACEMENT  2013    cardiac    KNEE MANIPULATION  02/16/2012    Performed by LATOYA CONNER at SURGERY [facility]    KNEE UNICOMPARTMENTAL  12/23/2011    Performed by LATOYA CONNER at SURGERY [facility]    KNEE ARTHROSCOPY  12/23/2011    Performed by LATOYA CONNER at SURGERY [facility]    KNEE ARTHROSCOPY  05/03/2011    Performed by HANANE GOLDMAN at SURGERY SAME DAY [facility]    MENISCECTOMY, KNEE, MEDIAL  05/03/2011    Performed by HANANE GOLDMAN at SURGERY SAME DAY [facility] ORS    OTHER  09/10/2010    RIGHT KIDNEY TRANSPLANT    KNEE ARTHROPLASTY TOTAL  01/12/2007    RIGHT    KNEE ARTHROSCOPY  04/10/2006    RIGHT    OTHER ORTHOPEDIC SURGERY  07/08/1974    LEFT KNEE DEBRIDEMENT       Hospital Medications:    Current Facility-Administered Medications:     lactated ringers infusion, , Intravenous, Continuous, Mark Simpson M.D.    Current Outpatient Medications:  Medications Prior to Admission   Medication Sig Dispense Refill Last Dose    apixaban (ELIQUIS) 5mg Tab Take 1 Tablet by mouth 2 times a day. 180 Tablet 2 2/29/2024 at 0730    aspirin 81 MG EC tablet Take 1 Tablet by mouth every day. 100 Tablet 1 2/28/2024 at 1930    metoprolol SR (TOPROL XL) 25 MG TABLET SR 24 HR Take 1 Tablet by mouth every day. 90 Tablet 3 2/29/2024 at 0730    gabapentin (NEURONTIN) 300 MG Cap TAKE 2 CAPSULES BY MOUTH TWICE DAILY 360 Capsule 1 2/29/2024 at 0730    linagliptin (TRADJENTA) 5 MG Tab tablet Take 1 Tablet by mouth every day. 100 Tablet 3 2/28/2024 at 1930    mycophenolate (CELLCEPT) 500 MG tablet Take 500 mg by mouth 2 times a day.   2/29/2024 at 0730    glyBURIDE (DIABETA) 5 MG Tab Take 2 Tablets by mouth 2 times a day with meals. 400 Tablet 3 2/28/2024 at 1930    predniSONE (DELTASONE) 5 MG Tab Take 1 Tablet by mouth every day. 30 Tablet 0 2/29/2024 at 0730     tacrolimus (PROGRAF) 1 MG Cap Take 1 Capsule by mouth 2 times a day. (Patient taking differently: Take 1 mg by mouth 2 times a day. 0800  2000) 60 Capsule 3 2/29/2024 at 0730    traZODone (DESYREL) 50 MG Tab Take 1 Tablet by mouth every evening. 30 Tablet 3     tadalafil (CIALIS) 5 MG tablet : TAKE 1 TABLETS 30 MINUTES BEFORE SEXUAL ACTIVITY, DO NOT EXCEED MORE THAN ONE DOSE A DAY 15 Tablet 3     pioglitazone (ACTOS) 45 MG Tab TAKE ONE TABLET BY MOUTH ONCE DAILY 100 Tablet 3  at 1930    omeprazole (PRILOSEC) 20 MG delayed-release capsule Take 1 Capsule by mouth every day. 90 Capsule 3  at 0730       Medication Allergy:  Allergies   Allergen Reactions    Doxycycline Rash     Sweats and shakes: 9/28/17: Clarified allergy with patient. Allergy was in 1998 and he doesn't remember what happened. He thought the medication is for pain.  Tolerates doxycycline 9/2017       Family History:  History reviewed. No pertinent family history.    Social History:  Social History     Socioeconomic History    Marital status: Single     Spouse name: Not on file    Number of children: Not on file    Years of education: Not on file    Highest education level: Not on file   Occupational History    Not on file   Tobacco Use    Smoking status: Never     Passive exposure: Never    Smokeless tobacco: Never   Vaping Use    Vaping Use: Never used   Substance and Sexual Activity    Alcohol use: No    Drug use: No    Sexual activity: Yes     Partners: Female   Other Topics Concern    Not on file   Social History Narrative    Not on file     Social Determinants of Health     Financial Resource Strain: Not on file   Food Insecurity: Not on file   Transportation Needs: Not on file   Physical Activity: Not on file   Stress: Not on file   Social Connections: Not on file   Intimate Partner Violence: Not on file   Housing Stability: Not on file         Physical Exam:  Vitals/ General Appearance:   Weight/BMI: Body mass index is 28.67 kg/m².  /78  "  Pulse (!) 52   Temp 36.6 °C (97.9 °F) (Temporal)   Resp 16   Ht 1.969 m (6' 5.5\")   Wt 111 kg (244 lb 14.9 oz)   SpO2 94%   Vitals:    02/29/24 0935 02/29/24 0946   BP: 138/78    Pulse: (!) 52    Resp: 16    Temp: 36.6 °C (97.9 °F)    TempSrc: Temporal    SpO2: 94%    Weight:  111 kg (244 lb 14.9 oz)   Height:  1.969 m (6' 5.5\")     Oxygen Therapy:  Pulse Oximetry: 94 %, O2 Delivery Device: None - Room Air    Constitutional:   No acute distress  HENMT:  Normocephalic, Atraumatic.  Eyes:  EOMI, No discharge.  Neck:  no JVD.  Cardiovascular:  Normal heart rate, Normal rhythm.  Extremitites with intact distal pulses, no cyanosis, or edema.  Lungs:  No respiratory distress.  Abdomen: Soft, No tenderness, No guarding, No rebound.  Skin: No significant rash.  Neurologic: Alert & oriented x 3, No focal deficits noted, cranial nerves II through X are intact.  Psychiatric: Affect normal, Judgment normal, Mood normal.      MDM (Data Review):     Records reviewed and summarized in current documentation    Lab Data Review:  No results found for this or any previous visit (from the past 24 hour(s)).    Imaging/Procedures Review:    Chest Xray:  Reviewed    EKG:   As in HPI.     MDM (Assessment and Plan):     Active Hospital Problems    Diagnosis     Presence of Watchman left atrial appendage closure device [Z95.818]          Risks and benefits of transesophageal echocardiogram were explained to patient.  Patient showed good understanding.  Risks including esophageal perforation, death, bleeding, infection were clearly conveyed to patient.  Patient is willing to accept the risks and proceed with procedure.        Jaspreet Mosqueda MD.   Cardiology Inpatient Service.  [facility].  [phone].  [place], [place].    "

## 2024-03-04 RX ORDER — ATORVASTATIN CALCIUM 80 MG/1
80 TABLET, FILM COATED ORAL
Qty: 100 TABLET | Refills: 3 | Status: SHIPPED | OUTPATIENT
Start: 2024-03-04

## 2024-03-04 NOTE — PROGRESS NOTES
"Chief Complaint   Patient presents with    Atrial Fibrillation     F/V Dx: PAF (paroxysmal atrial fibrillation) ([facility])       Subjective     Jama Altman is a 68 y.o. male who presents today for procedural follow up after PIPE closure with Watchman implant by Dr. Simpson 1/9/24 secondary to excessive bruising on anticoagulation.     Procedure completed with implant of a 20 mm Watchman, serial number [id number].  He had 45 day post watchman RADHA completed 2/29/24, which showed a well seated device without dawn device leaks.     Today in follow-up he reports that he has been doing well from a cardiovascular standpoint.  He reports that his heart rate is elevated today because he has not taking his metoprolol this a.m. Was apparently planned to have surgery and is upset that surgery is canceled.  Reports that he checks his blood pressure and heart rate at home, states blood pressure usually in appropriate range and heart rate he is unsure but does not recall any significant high or lows.     Patient reports recent axillary mass for which he had skin biopsy and fluid removal.  Biopsy was positive for squamous cell cancer.  Recent CT scan showed large mass, primary vs metastatic and adenopathy.  He was scheduled to have mass removal today with Dr. Neal, however he reports that this has been canceled and he is supposed to follow-up with oncology.  He reports that he was placed on antibiotic of unknown name by his dermatologist, for which she is currently taking.    Today patient's significant other requested to have private discussion with me.  She states she is worried about patient's overall health status and possible infection at axillary mass site.  After speaking with significant other separately, I did speak with Mr. Altman without so present.  He states that site \"is always red\" and that he is on antibiotics and that he does not think his condition has worsened.  He denies fevers, chills or systemic symptoms today.  I " have asked to look at site today.  He did defer this.  I have discussed with him that if he has ongoing tachycardia, development of fever, chills or rigors, drainage/pain at site that he needs to be seen at his closest emergency room and he does verbalize understanding of this.  Past Medical History:   Diagnosis Date    A-fib (HCC)     Arrhythmia     Benign essential hypertension     Diabetes (HCC)     type 2    Heart burn     Hemorrhagic disorder (HCC)     Eliquis    Hyperlipoproteinemia     Hypertension     not on meds anymore    Indigestion     Myocardial infarct (HCC) 2013    stent    Polycystic kidney 09/10/2010    RIGHT KIDNEY TRANSPLANT    Presence of Watchman left atrial appendage closure device 02/29/2024    Sleep apnea 01/30/2024    states he was told he had sleep apnea but has not had a sleep study    Snoring      Past Surgical History:   Procedure Laterality Date    STENT PLACEMENT  2013    cardiac    KNEE MANIPULATION  02/16/2012    Performed by LATOYA CONNER at SURGERY [facility]    KNEE UNICOMPARTMENTAL  12/23/2011    Performed by LATOYA CONNER at SURGERY [facility]    KNEE ARTHROSCOPY  12/23/2011    Performed by LATOYA CONNER at SURGERY [facility] ORS    KNEE ARTHROSCOPY  05/03/2011    Performed by HANANE GOLDMAN at SURGERY SAME DAY [facility]    MENISCECTOMY, KNEE, MEDIAL  05/03/2011    Performed by HANANE GOLDMAN at SURGERY SAME DAY [facility] ORS    OTHER  09/10/2010    RIGHT KIDNEY TRANSPLANT    KNEE ARTHROPLASTY TOTAL  01/12/2007    RIGHT    KNEE ARTHROSCOPY  04/10/2006    RIGHT    OTHER ORTHOPEDIC SURGERY  07/08/1974    LEFT KNEE DEBRIDEMENT     No family history on file.  Social History     Socioeconomic History    Marital status: Single     Spouse name: Not on file    Number of children: Not on file    Years of education: Not on file    Highest education level: Not on file   Occupational History    Not on file   Tobacco Use    Smoking status: Never     Passive exposure: Never     Smokeless tobacco: Never   Vaping Use    Vaping Use: Never used   Substance and Sexual Activity    Alcohol use: No    Drug use: No    Sexual activity: Yes     Partners: Female   Other Topics Concern    Not on file   Social History Narrative    Not on file     Social Determinants of Health     Financial Resource Strain: Not on file   Food Insecurity: Not on file   Transportation Needs: Not on file   Physical Activity: Not on file   Stress: Not on file   Social Connections: Not on file   Intimate Partner Violence: Not on file   Housing Stability: Not on file     Allergies   Allergen Reactions    Doxycycline Rash     Sweats and shakes: 9/28/17: Clarified allergy with patient. Allergy was in 1998 and he doesn't remember what happened. He thought the medication is for pain.  Tolerates doxycycline 9/2017     Outpatient Encounter Medications as of 3/6/2024   Medication Sig Dispense Refill    atorvastatin (LIPITOR) 80 MG tablet Take 1 Tablet by mouth at bedtime. 100 Tablet 3    traZODone (DESYREL) 50 MG Tab Take 1 Tablet by mouth every evening. 30 Tablet 3    apixaban (ELIQUIS) 5mg Tab Take 1 Tablet by mouth 2 times a day. 180 Tablet 2    tadalafil (CIALIS) 5 MG tablet : TAKE 1 TABLETS 30 MINUTES BEFORE SEXUAL ACTIVITY, DO NOT EXCEED MORE THAN ONE DOSE A DAY 15 Tablet 3    aspirin 81 MG EC tablet Take 1 Tablet by mouth every day. 100 Tablet 1    metoprolol SR (TOPROL XL) 25 MG TABLET SR 24 HR Take 1 Tablet by mouth every day. 90 Tablet 3    pioglitazone (ACTOS) 45 MG Tab TAKE ONE TABLET BY MOUTH ONCE DAILY 100 Tablet 3    gabapentin (NEURONTIN) 300 MG Cap TAKE 2 CAPSULES BY MOUTH TWICE DAILY 360 Capsule 1    linagliptin (TRADJENTA) 5 MG Tab tablet Take 1 Tablet by mouth every day. 100 Tablet 3    omeprazole (PRILOSEC) 20 MG delayed-release capsule Take 1 Capsule by mouth every day. 90 Capsule 3    mycophenolate (CELLCEPT) 500 MG tablet Take 500 mg by mouth 2 times a day.      glyBURIDE (DIABETA) 5 MG Tab Take 2 Tablets  "by mouth 2 times a day with meals. 400 Tablet 3    predniSONE (DELTASONE) 5 MG Tab Take 1 Tablet by mouth every day. 30 Tablet 0    tacrolimus (PROGRAF) 1 MG Cap Take 1 Capsule by mouth 2 times a day. (Patient taking differently: Take 1 mg by mouth 2 times a day. 0800  2000) 60 Capsule 3     No facility-administered encounter medications on file as of 3/6/2024.     Review of Systems   Constitutional:  Negative for chills, fever, malaise/fatigue and weight loss.   HENT:  Negative for congestion, nosebleeds and tinnitus.    Respiratory:  Negative for cough, hemoptysis, sputum production, shortness of breath and wheezing.    Cardiovascular:  Negative for chest pain, palpitations, orthopnea, leg swelling and PND.   Gastrointestinal:  Negative for blood in stool, heartburn, nausea and vomiting.   Skin:         Reports axillary mass with recent biopsy and drainage.  Reports squamous cells in path.    Neurological:  Negative for dizziness, tingling, tremors, sensory change, speech change, focal weakness, loss of consciousness and headaches.              Objective     /76 (BP Location: Right arm, Patient Position: Sitting, BP Cuff Size: Adult)   Pulse 94   Resp 14   Ht 1.956 m (6' 5\")   Wt 111 kg (245 lb)   SpO2 96%   BMI 29.05 kg/m²     Physical Exam  Constitutional:       Appearance: Normal appearance. He is well-developed.   HENT:      Head: Normocephalic and atraumatic.      Mouth/Throat:      Mouth: Mucous membranes are moist.      Pharynx: Oropharynx is clear.   Eyes:      Extraocular Movements: Extraocular movements intact.      Conjunctiva/sclera: Conjunctivae normal.      Pupils: Pupils are equal, round, and reactive to light.   Neck:      Vascular: No JVD.   Cardiovascular:      Rate and Rhythm: Tachycardia present. Rhythm irregular.      Pulses: Normal pulses.      Heart sounds: Normal heart sounds. No murmur heard.     No friction rub. No gallop.   Pulmonary:      Effort: Pulmonary effort is normal. "      Breath sounds: Normal breath sounds. No wheezing or rales.   Chest:      Chest wall: No tenderness.   Musculoskeletal:         General: Normal range of motion.      Cervical back: Normal range of motion and neck supple.      Right lower leg: No edema.      Left lower leg: No edema.   Skin:     General: Skin is warm and dry.      Capillary Refill: Capillary refill takes 2 to 3 seconds.   Neurological:      Mental Status: He is alert and oriented to person, place, and time.   Psychiatric:         Mood and Affect: Mood normal.         Behavior: Behavior normal.         Thought Content: Thought content normal.         Judgment: Judgment normal.          Assessment & Plan     1. PAF (paroxysmal atrial fibrillation) (HCC)  EKG      2. Presence of Watchman left atrial appendage closure device        3. History of MI (myocardial infarction)  metoprolol SR (TOPROL XL) 25 MG TABLET SR 24 HR      4. Hypertensive heart and chronic kidney disease with heart failure and stage 1 through stage 4 chronic kidney disease, or unspecified chronic kidney disease (HCC)          HCC Gap Form    Diagnosis to address: I13.0 - Hypertensive heart and chronic kidney disease with heart failure and stage 1 through stage 4 chronic kidney disease, or unspecified chronic kidney disease (HCC)  Assessment and plan: Chronic, stable. Continue with current defined treatment plan: HTN is well controled.  Follows with renal for prior renal transplant. Follow-up at least annually or as directed by specialist .  Last edited 03/06/24 11:46 PST by BITA Cadet.         Medical Decision Making: Today's Assessment/Status/Plan:   Paroxysmal Afib:  - Relatively asymptomatic to AF events.  He is not on AAD.   - He is tachycardic in office today.  Rhythm appears most c/w atrial tachycardia.  Discussed would like him to go home and take his Toprol.  If rate remains elevated, may take additional dose of Toprol.  If does not break, he needs to contact  the office vs be seen in the ER.   - Toprol dose adjusted for additional PRN needs.      2. PIPE Closure with Watchman:  - Device implanted 1/9/24.    - He has had his 45 day post watchman RADHA which showed well seated device without dawn device leak.  - Discussed can stop anticoagulation at this time.  He will continue ASA 81 mg, enteric coated lifelong.     3. Prior MI/CAD:  - Blood pressure is controled today in office.   - Last Lipid panel with LDL of 47 (2/16/24) on 80 mg lipitor.   - Continue Toprol. Continue ASA. Nonsmoker.   - Last accessed EF %.    - Referral to general cardiology.     4. HTN:  - Blood pressure is controled today.      Today I additionally emphasized s/s to be seen in ER if he should develop signs of infection to axillary site.     Return to clinic to establish with general cardiology, follow-up with EP, Dr. Middleton in 3 months.

## 2024-03-04 NOTE — TELEPHONE ENCOUNTER
Received request via: Patient    Was the patient seen in the last year in this department? Yes    Does the patient have an active prescription (recently filled or refills available) for medication(s) requested? No    Pharmacy Name:  [id number] - [place], [place] - [address] AT [facility] [address]     Does the patient have shelter Plus and need 100 day supply (blood pressure, diabetes and cholesterol meds only)?   Yes, quantity updated to 100 days    Patient called stating he was unsure how it wasn't on his chart anymore but he has been taking it and needs a refill

## 2024-03-06 ENCOUNTER — HOSPITAL ENCOUNTER (INPATIENT)
Facility: MEDICAL CENTER | Age: 69
LOS: 9 days | DRG: 871 | End: 2024-03-16
Attending: STUDENT IN AN ORGANIZED HEALTH CARE EDUCATION/TRAINING PROGRAM | Admitting: INTERNAL MEDICINE
Payer: MEDICARE

## 2024-03-06 ENCOUNTER — OFFICE VISIT (OUTPATIENT)
Dept: CARDIOLOGY | Facility: MEDICAL CENTER | Age: 69
DRG: 871 | End: 2024-03-06
Attending: NURSE PRACTITIONER
Payer: MEDICARE

## 2024-03-06 ENCOUNTER — APPOINTMENT (OUTPATIENT)
Dept: RADIOLOGY | Facility: MEDICAL CENTER | Age: 69
DRG: 871 | End: 2024-03-06
Attending: STUDENT IN AN ORGANIZED HEALTH CARE EDUCATION/TRAINING PROGRAM
Payer: MEDICARE

## 2024-03-06 VITALS
OXYGEN SATURATION: 96 % | WEIGHT: 245 LBS | SYSTOLIC BLOOD PRESSURE: 122 MMHG | BODY MASS INDEX: 28.93 KG/M2 | HEIGHT: 77 IN | DIASTOLIC BLOOD PRESSURE: 76 MMHG | HEART RATE: 94 BPM | RESPIRATION RATE: 14 BRPM

## 2024-03-06 DIAGNOSIS — C44.92 SQUAMOUS CELL CARCINOMA OF SKIN: ICD-10-CM

## 2024-03-06 DIAGNOSIS — L03.112 CELLULITIS OF LEFT AXILLA: ICD-10-CM

## 2024-03-06 DIAGNOSIS — N17.9 ACUTE KIDNEY INJURY (HCC): ICD-10-CM

## 2024-03-06 DIAGNOSIS — G62.89 OTHER POLYNEUROPATHY: ICD-10-CM

## 2024-03-06 DIAGNOSIS — I13.0 HYPERTENSIVE HEART AND CHRONIC KIDNEY DISEASE WITH HEART FAILURE AND STAGE 1 THROUGH STAGE 4 CHRONIC KIDNEY DISEASE, OR UNSPECIFIED CHRONIC KIDNEY DISEASE (HCC): ICD-10-CM

## 2024-03-06 DIAGNOSIS — Z95.818 PRESENCE OF WATCHMAN LEFT ATRIAL APPENDAGE CLOSURE DEVICE: ICD-10-CM

## 2024-03-06 DIAGNOSIS — Z94.0 HISTORY OF RENAL TRANSPLANT: ICD-10-CM

## 2024-03-06 DIAGNOSIS — A41.9 SEVERE SEPSIS (HCC): ICD-10-CM

## 2024-03-06 DIAGNOSIS — I25.2 HISTORY OF MI (MYOCARDIAL INFARCTION): ICD-10-CM

## 2024-03-06 DIAGNOSIS — L02.412 ABSCESS OF AXILLA, LEFT: ICD-10-CM

## 2024-03-06 DIAGNOSIS — I95.9 HYPOTENSION, UNSPECIFIED HYPOTENSION TYPE: ICD-10-CM

## 2024-03-06 DIAGNOSIS — R65.20 SEVERE SEPSIS (HCC): ICD-10-CM

## 2024-03-06 DIAGNOSIS — I48.0 PAF (PAROXYSMAL ATRIAL FIBRILLATION) (HCC): ICD-10-CM

## 2024-03-06 LAB
ALBUMIN SERPL BCP-MCNC: 4.4 G/DL (ref 3.2–4.9)
ALBUMIN/GLOB SERPL: 1.6 G/DL
ALP SERPL-CCNC: 86 U/L (ref 30–99)
ALT SERPL-CCNC: 24 U/L (ref 2–50)
ANION GAP SERPL CALC-SCNC: 18 MMOL/L (ref 7–16)
AST SERPL-CCNC: 19 U/L (ref 12–45)
BASOPHILS # BLD AUTO: 0.3 % (ref 0–1.8)
BASOPHILS # BLD: 0.07 K/UL (ref 0–0.12)
BILIRUB SERPL-MCNC: 1.4 MG/DL (ref 0.1–1.5)
BUN SERPL-MCNC: 44 MG/DL (ref 8–22)
CALCIUM ALBUM COR SERPL-MCNC: 9.4 MG/DL (ref 8.5–10.5)
CALCIUM SERPL-MCNC: 9.7 MG/DL (ref 8.5–10.5)
CHLORIDE SERPL-SCNC: 102 MMOL/L (ref 96–112)
CO2 SERPL-SCNC: 19 MMOL/L (ref 20–33)
CREAT SERPL-MCNC: 2.6 MG/DL (ref 0.5–1.4)
EKG IMPRESSION: NORMAL
EOSINOPHIL # BLD AUTO: 0 K/UL (ref 0–0.51)
EOSINOPHIL NFR BLD: 0 % (ref 0–6.9)
ERYTHROCYTE [DISTWIDTH] IN BLOOD BY AUTOMATED COUNT: 47.2 FL (ref 35.9–50)
FLUAV RNA SPEC QL NAA+PROBE: NEGATIVE
FLUBV RNA SPEC QL NAA+PROBE: NEGATIVE
GFR SERPLBLD CREATININE-BSD FMLA CKD-EPI: 26 ML/MIN/1.73 M 2
GLOBULIN SER CALC-MCNC: 2.8 G/DL (ref 1.9–3.5)
GLUCOSE SERPL-MCNC: 119 MG/DL (ref 65–99)
HCT VFR BLD AUTO: 45 % (ref 42–52)
HGB BLD-MCNC: 15 G/DL (ref 14–18)
IMM GRANULOCYTES # BLD AUTO: 0.29 K/UL (ref 0–0.11)
IMM GRANULOCYTES NFR BLD AUTO: 1.4 % (ref 0–0.9)
LACTATE SERPL-SCNC: 2.4 MMOL/L (ref 0.5–2)
LACTATE SERPL-SCNC: 3.5 MMOL/L (ref 0.5–2)
LYMPHOCYTES # BLD AUTO: 0.41 K/UL (ref 1–4.8)
LYMPHOCYTES NFR BLD: 1.9 % (ref 22–41)
MCH RBC QN AUTO: 29.7 PG (ref 27–33)
MCHC RBC AUTO-ENTMCNC: 33.3 G/DL (ref 32.3–36.5)
MCV RBC AUTO: 89.1 FL (ref 81.4–97.8)
MONOCYTES # BLD AUTO: 1.74 K/UL (ref 0–0.85)
MONOCYTES NFR BLD AUTO: 8.2 % (ref 0–13.4)
NEUTROPHILS # BLD AUTO: 18.66 K/UL (ref 1.82–7.42)
NEUTROPHILS NFR BLD: 88.2 % (ref 44–72)
NRBC # BLD AUTO: 0 K/UL
NRBC BLD-RTO: 0 /100 WBC (ref 0–0.2)
PLATELET # BLD AUTO: 121 K/UL (ref 164–446)
PMV BLD AUTO: 12.3 FL (ref 9–12.9)
POTASSIUM SERPL-SCNC: 5.4 MMOL/L (ref 3.6–5.5)
PROT SERPL-MCNC: 7.2 G/DL (ref 6–8.2)
RBC # BLD AUTO: 5.05 M/UL (ref 4.7–6.1)
RSV RNA SPEC QL NAA+PROBE: NEGATIVE
SARS-COV-2 RNA RESP QL NAA+PROBE: NOTDETECTED
SODIUM SERPL-SCNC: 139 MMOL/L (ref 135–145)
WBC # BLD AUTO: 21.2 K/UL (ref 4.8–10.8)

## 2024-03-06 PROCEDURE — 83880 ASSAY OF NATRIURETIC PEPTIDE: CPT

## 2024-03-06 PROCEDURE — 0241U HCHG SARS-COV-2 COVID-19 NFCT DS RESP RNA 4 TRGT ED POC: CPT

## 2024-03-06 PROCEDURE — 93005 ELECTROCARDIOGRAM TRACING: CPT | Performed by: NURSE PRACTITIONER

## 2024-03-06 PROCEDURE — 700105 HCHG RX REV CODE 258: Performed by: STUDENT IN AN ORGANIZED HEALTH CARE EDUCATION/TRAINING PROGRAM

## 2024-03-06 PROCEDURE — 83605 ASSAY OF LACTIC ACID: CPT | Mod: 91

## 2024-03-06 PROCEDURE — 700111 HCHG RX REV CODE 636 W/ 250 OVERRIDE (IP): Mod: JZ | Performed by: STUDENT IN AN ORGANIZED HEALTH CARE EDUCATION/TRAINING PROGRAM

## 2024-03-06 PROCEDURE — 96366 THER/PROPH/DIAG IV INF ADDON: CPT

## 2024-03-06 PROCEDURE — 36415 COLL VENOUS BLD VENIPUNCTURE: CPT

## 2024-03-06 PROCEDURE — 85025 COMPLETE CBC W/AUTO DIFF WBC: CPT

## 2024-03-06 PROCEDURE — 96375 TX/PRO/DX INJ NEW DRUG ADDON: CPT

## 2024-03-06 PROCEDURE — 83735 ASSAY OF MAGNESIUM: CPT

## 2024-03-06 PROCEDURE — 84100 ASSAY OF PHOSPHORUS: CPT

## 2024-03-06 PROCEDURE — 96365 THER/PROPH/DIAG IV INF INIT: CPT

## 2024-03-06 PROCEDURE — 80053 COMPREHEN METABOLIC PANEL: CPT

## 2024-03-06 PROCEDURE — 99285 EMERGENCY DEPT VISIT HI MDM: CPT

## 2024-03-06 PROCEDURE — 99213 OFFICE O/P EST LOW 20 MIN: CPT | Performed by: NURSE PRACTITIONER

## 2024-03-06 PROCEDURE — 87040 BLOOD CULTURE FOR BACTERIA: CPT | Mod: 91

## 2024-03-06 PROCEDURE — 93005 ELECTROCARDIOGRAM TRACING: CPT | Performed by: STUDENT IN AN ORGANIZED HEALTH CARE EDUCATION/TRAINING PROGRAM

## 2024-03-06 PROCEDURE — 71250 CT THORAX DX C-: CPT

## 2024-03-06 PROCEDURE — 85610 PROTHROMBIN TIME: CPT

## 2024-03-06 RX ORDER — SODIUM CHLORIDE 9 MG/ML
1000 INJECTION, SOLUTION INTRAVENOUS ONCE
Status: COMPLETED | OUTPATIENT
Start: 2024-03-06 | End: 2024-03-07

## 2024-03-06 RX ORDER — SODIUM CHLORIDE 9 MG/ML
1000 INJECTION, SOLUTION INTRAVENOUS ONCE
Status: COMPLETED | OUTPATIENT
Start: 2024-03-06 | End: 2024-03-06

## 2024-03-06 RX ORDER — METOPROLOL SUCCINATE 25 MG/1
25 TABLET, EXTENDED RELEASE ORAL DAILY
Qty: 100 TABLET | Refills: 3 | Status: SHIPPED | OUTPATIENT
Start: 2024-03-06

## 2024-03-06 RX ORDER — MORPHINE SULFATE 4 MG/ML
4 INJECTION INTRAVENOUS ONCE
Status: COMPLETED | OUTPATIENT
Start: 2024-03-06 | End: 2024-03-06

## 2024-03-06 RX ORDER — ONDANSETRON 2 MG/ML
4 INJECTION INTRAMUSCULAR; INTRAVENOUS ONCE
Status: COMPLETED | OUTPATIENT
Start: 2024-03-06 | End: 2024-03-06

## 2024-03-06 RX ORDER — CEFTRIAXONE 2 G/1
2000 INJECTION, POWDER, FOR SOLUTION INTRAMUSCULAR; INTRAVENOUS ONCE
Status: COMPLETED | OUTPATIENT
Start: 2024-03-06 | End: 2024-03-06

## 2024-03-06 RX ADMIN — SODIUM CHLORIDE 1000 ML: 9 INJECTION, SOLUTION INTRAVENOUS at 22:45

## 2024-03-06 RX ADMIN — MORPHINE SULFATE 4 MG: 4 INJECTION, SOLUTION INTRAMUSCULAR; INTRAVENOUS at 20:40

## 2024-03-06 RX ADMIN — CEFTRIAXONE SODIUM 2000 MG: 2 INJECTION, POWDER, FOR SOLUTION INTRAMUSCULAR; INTRAVENOUS at 22:23

## 2024-03-06 RX ADMIN — SODIUM CHLORIDE 1000 ML: 9 INJECTION, SOLUTION INTRAVENOUS at 21:50

## 2024-03-06 RX ADMIN — ONDANSETRON 4 MG: 2 INJECTION INTRAMUSCULAR; INTRAVENOUS at 20:40

## 2024-03-06 RX ADMIN — VANCOMYCIN HYDROCHLORIDE 2750 MG: 5 INJECTION, POWDER, LYOPHILIZED, FOR SOLUTION INTRAVENOUS at 23:07

## 2024-03-06 ASSESSMENT — ENCOUNTER SYMPTOMS
FOCAL WEAKNESS: 0
NAUSEA: 0
HEARTBURN: 0
SENSORY CHANGE: 0
WHEEZING: 0
TREMORS: 0
PND: 0
BLOOD IN STOOL: 0
HEMOPTYSIS: 0
COUGH: 0
DIZZINESS: 0
SPEECH CHANGE: 0
VOMITING: 0
SPUTUM PRODUCTION: 0
PALPITATIONS: 0
SHORTNESS OF BREATH: 0
ORTHOPNEA: 0
HEADACHES: 0
WEIGHT LOSS: 0
LOSS OF CONSCIOUSNESS: 0
TINGLING: 0
FEVER: 0
CHILLS: 0

## 2024-03-06 ASSESSMENT — FIBROSIS 4 INDEX
FIB4 SCORE: 1.92
FIB4 SCORE: 1.92

## 2024-03-06 NOTE — PATIENT INSTRUCTIONS
Take additional 25 mg of Metoprolol if HR >110 BPM sustaining. Please Notify clinic if needing to take multiple times weekly.

## 2024-03-07 ENCOUNTER — APPOINTMENT (OUTPATIENT)
Dept: RADIOLOGY | Facility: MEDICAL CENTER | Age: 69
DRG: 871 | End: 2024-03-07
Attending: INTERNAL MEDICINE
Payer: MEDICARE

## 2024-03-07 ENCOUNTER — APPOINTMENT (OUTPATIENT)
Dept: RADIOLOGY | Facility: MEDICAL CENTER | Age: 69
DRG: 871 | End: 2024-03-07
Attending: SURGERY
Payer: MEDICARE

## 2024-03-07 PROBLEM — R65.20 SEVERE SEPSIS (HCC): Status: ACTIVE | Noted: 2024-03-07

## 2024-03-07 PROBLEM — R65.21 SEPTIC SHOCK (HCC): Status: ACTIVE | Noted: 2024-03-07

## 2024-03-07 PROBLEM — L02.412 ABSCESS OF AXILLA, LEFT: Status: ACTIVE | Noted: 2024-03-07

## 2024-03-07 PROBLEM — A41.9 SEVERE SEPSIS (HCC): Status: ACTIVE | Noted: 2024-03-07

## 2024-03-07 PROBLEM — A41.9 SEPSIS (HCC): Status: ACTIVE | Noted: 2024-03-07

## 2024-03-07 PROBLEM — E87.29 HIGH ANION GAP METABOLIC ACIDOSIS: Status: ACTIVE | Noted: 2024-03-07

## 2024-03-07 PROBLEM — C44.92 SQUAMOUS CELL CARCINOMA OF SKIN: Status: ACTIVE | Noted: 2024-03-07

## 2024-03-07 PROBLEM — G62.9 PERIPHERAL NEUROPATHY: Status: ACTIVE | Noted: 2024-03-07

## 2024-03-07 PROBLEM — I48.91 AF (ATRIAL FIBRILLATION) (HCC): Status: ACTIVE | Noted: 2022-04-05

## 2024-03-07 PROBLEM — E11.9 TYPE 2 DIABETES MELLITUS (HCC): Status: ACTIVE | Noted: 2021-12-16

## 2024-03-07 LAB
APPEARANCE UR: CLEAR
BILIRUB UR QL STRIP.AUTO: NEGATIVE
CFT BLD TEG: 5.2 MIN (ref 4.6–9.1)
CFT P HPASE BLD TEG: 4.8 MIN (ref 4.3–8.3)
CLOT ANGLE BLD TEG: 68.1 DEGREES (ref 63–78)
CLOT LYSIS 30M P MA LENFR BLD TEG: 1.1 % (ref 0–2.6)
COLOR UR: YELLOW
CT.EXTRINSIC BLD ROTEM: 1.9 MIN (ref 0.8–2.1)
GLUCOSE BLD STRIP.AUTO-MCNC: 101 MG/DL (ref 65–99)
GLUCOSE BLD STRIP.AUTO-MCNC: 277 MG/DL (ref 65–99)
GLUCOSE BLD STRIP.AUTO-MCNC: 51 MG/DL (ref 65–99)
GLUCOSE BLD STRIP.AUTO-MCNC: 59 MG/DL (ref 65–99)
GLUCOSE UR STRIP.AUTO-MCNC: NEGATIVE MG/DL
GRAM STN SPEC: NORMAL
INR PPP: 1.34 (ref 0.87–1.13)
KETONES UR STRIP.AUTO-MCNC: ABNORMAL MG/DL
LACTATE SERPL-SCNC: 1.6 MMOL/L (ref 0.5–2)
LEUKOCYTE ESTERASE UR QL STRIP.AUTO: NEGATIVE
MAGNESIUM SERPL-MCNC: 1.2 MG/DL (ref 1.5–2.5)
MCF BLD TEG: 52.8 MM (ref 52–69)
MCF.PLATELET INHIB BLD ROTEM: 19.4 MM (ref 15–32)
MICRO URNS: ABNORMAL
NITRITE UR QL STRIP.AUTO: NEGATIVE
NT-PROBNP SERPL IA-MCNC: ABNORMAL PG/ML (ref 0–125)
PA AA BLD-ACNC: 91.8 % (ref 0–11)
PA ADP BLD-ACNC: 95.2 % (ref 0–17)
PH UR STRIP.AUTO: 5 [PH] (ref 5–8)
PHOSPHATE SERPL-MCNC: 1.9 MG/DL (ref 2.5–4.5)
PROT UR QL STRIP: NEGATIVE MG/DL
PROTHROMBIN TIME: 16.7 SEC (ref 12–14.6)
RBC UR QL AUTO: NEGATIVE
SCCMEC + MECA PNL NOSE NAA+PROBE: POSITIVE
SIGNIFICANT IND 70042: NORMAL
SITE SITE: NORMAL
SOURCE SOURCE: NORMAL
SP GR UR STRIP.AUTO: 1.02
TEG ALGORITHM TGALG: ABNORMAL
UROBILINOGEN UR STRIP.AUTO-MCNC: 0.2 MG/DL

## 2024-03-07 PROCEDURE — 770000 HCHG ROOM/CARE - INTERMEDIATE ICU *

## 2024-03-07 PROCEDURE — 82962 GLUCOSE BLOOD TEST: CPT

## 2024-03-07 PROCEDURE — 700105 HCHG RX REV CODE 258: Performed by: INTERNAL MEDICINE

## 2024-03-07 PROCEDURE — 85576 BLOOD PLATELET AGGREGATION: CPT | Mod: 91

## 2024-03-07 PROCEDURE — 700101 HCHG RX REV CODE 250: Performed by: INTERNAL MEDICINE

## 2024-03-07 PROCEDURE — 85347 COAGULATION TIME ACTIVATED: CPT

## 2024-03-07 PROCEDURE — 96367 TX/PROPH/DG ADDL SEQ IV INF: CPT

## 2024-03-07 PROCEDURE — 87075 CULTR BACTERIA EXCEPT BLOOD: CPT

## 2024-03-07 PROCEDURE — 10060 I&D ABSCESS SIMPLE/SINGLE: CPT

## 2024-03-07 PROCEDURE — 87077 CULTURE AEROBIC IDENTIFY: CPT | Mod: 91

## 2024-03-07 PROCEDURE — 87070 CULTURE OTHR SPECIMN AEROBIC: CPT

## 2024-03-07 PROCEDURE — 99999 PR NO CHARGE: CPT | Performed by: NURSE PRACTITIONER

## 2024-03-07 PROCEDURE — 700111 HCHG RX REV CODE 636 W/ 250 OVERRIDE (IP): Performed by: STUDENT IN AN ORGANIZED HEALTH CARE EDUCATION/TRAINING PROGRAM

## 2024-03-07 PROCEDURE — 83605 ASSAY OF LACTIC ACID: CPT

## 2024-03-07 PROCEDURE — 700111 HCHG RX REV CODE 636 W/ 250 OVERRIDE (IP): Mod: JZ | Performed by: INTERNAL MEDICINE

## 2024-03-07 PROCEDURE — 87641 MR-STAPH DNA AMP PROBE: CPT

## 2024-03-07 PROCEDURE — 96366 THER/PROPH/DIAG IV INF ADDON: CPT

## 2024-03-07 PROCEDURE — 85384 FIBRINOGEN ACTIVITY: CPT | Mod: 91

## 2024-03-07 PROCEDURE — 99291 CRITICAL CARE FIRST HOUR: CPT | Performed by: INTERNAL MEDICINE

## 2024-03-07 PROCEDURE — 0X9530Z DRAINAGE OF LEFT AXILLA WITH DRAINAGE DEVICE, PERCUTANEOUS APPROACH: ICD-10-PCS | Performed by: RADIOLOGY

## 2024-03-07 PROCEDURE — A9270 NON-COVERED ITEM OR SERVICE: HCPCS | Performed by: STUDENT IN AN ORGANIZED HEALTH CARE EDUCATION/TRAINING PROGRAM

## 2024-03-07 PROCEDURE — 700102 HCHG RX REV CODE 250 W/ 637 OVERRIDE(OP): Performed by: STUDENT IN AN ORGANIZED HEALTH CARE EDUCATION/TRAINING PROGRAM

## 2024-03-07 PROCEDURE — 99221 1ST HOSP IP/OBS SF/LOW 40: CPT | Performed by: SURGERY

## 2024-03-07 PROCEDURE — 700102 HCHG RX REV CODE 250 W/ 637 OVERRIDE(OP): Performed by: INTERNAL MEDICINE

## 2024-03-07 PROCEDURE — 87186 SC STD MICRODIL/AGAR DIL: CPT | Mod: 91

## 2024-03-07 PROCEDURE — 81003 URINALYSIS AUTO W/O SCOPE: CPT

## 2024-03-07 PROCEDURE — 99223 1ST HOSP IP/OBS HIGH 75: CPT | Mod: AI,GC | Performed by: STUDENT IN AN ORGANIZED HEALTH CARE EDUCATION/TRAINING PROGRAM

## 2024-03-07 PROCEDURE — 76775 US EXAM ABDO BACK WALL LIM: CPT

## 2024-03-07 PROCEDURE — 36415 COLL VENOUS BLD VENIPUNCTURE: CPT

## 2024-03-07 PROCEDURE — 700105 HCHG RX REV CODE 258: Performed by: STUDENT IN AN ORGANIZED HEALTH CARE EDUCATION/TRAINING PROGRAM

## 2024-03-07 PROCEDURE — 87086 URINE CULTURE/COLONY COUNT: CPT

## 2024-03-07 PROCEDURE — 87076 CULTURE ANAEROBE IDENT EACH: CPT

## 2024-03-07 PROCEDURE — 97161 PT EVAL LOW COMPLEX 20 MIN: CPT

## 2024-03-07 PROCEDURE — 87205 SMEAR GRAM STAIN: CPT

## 2024-03-07 PROCEDURE — 96375 TX/PRO/DX INJ NEW DRUG ADDON: CPT

## 2024-03-07 RX ORDER — MIDODRINE HYDROCHLORIDE 5 MG/1
5 TABLET ORAL
Status: DISCONTINUED | OUTPATIENT
Start: 2024-03-07 | End: 2024-03-07

## 2024-03-07 RX ORDER — ACETAMINOPHEN 325 MG/1
650 TABLET ORAL EVERY 6 HOURS PRN
Status: DISCONTINUED | OUTPATIENT
Start: 2024-03-07 | End: 2024-03-16 | Stop reason: HOSPADM

## 2024-03-07 RX ORDER — ONDANSETRON 4 MG/1
4 TABLET, ORALLY DISINTEGRATING ORAL EVERY 4 HOURS PRN
Status: DISCONTINUED | OUTPATIENT
Start: 2024-03-07 | End: 2024-03-16 | Stop reason: HOSPADM

## 2024-03-07 RX ORDER — ONDANSETRON 2 MG/ML
4 INJECTION INTRAMUSCULAR; INTRAVENOUS EVERY 4 HOURS PRN
Status: DISCONTINUED | OUTPATIENT
Start: 2024-03-07 | End: 2024-03-16 | Stop reason: HOSPADM

## 2024-03-07 RX ORDER — AMOXICILLIN 250 MG
2 CAPSULE ORAL EVERY EVENING
Status: DISCONTINUED | OUTPATIENT
Start: 2024-03-07 | End: 2024-03-16 | Stop reason: HOSPADM

## 2024-03-07 RX ORDER — OMEPRAZOLE 20 MG/1
20 CAPSULE, DELAYED RELEASE ORAL DAILY
Status: DISCONTINUED | OUTPATIENT
Start: 2024-03-07 | End: 2024-03-16 | Stop reason: HOSPADM

## 2024-03-07 RX ORDER — PREDNISONE 10 MG/1
5 TABLET ORAL DAILY
Status: DISCONTINUED | OUTPATIENT
Start: 2024-03-07 | End: 2024-03-07

## 2024-03-07 RX ORDER — TACROLIMUS 1 MG/1
1 CAPSULE ORAL 2 TIMES DAILY
Status: DISCONTINUED | OUTPATIENT
Start: 2024-03-07 | End: 2024-03-15

## 2024-03-07 RX ORDER — NOREPINEPHRINE BITARTRATE 0.03 MG/ML
0-1 INJECTION, SOLUTION INTRAVENOUS CONTINUOUS
Status: DISCONTINUED | OUTPATIENT
Start: 2024-03-07 | End: 2024-03-09

## 2024-03-07 RX ORDER — LINEZOLID 2 MG/ML
600 INJECTION, SOLUTION INTRAVENOUS EVERY 12 HOURS
Status: DISCONTINUED | OUTPATIENT
Start: 2024-03-07 | End: 2024-03-08

## 2024-03-07 RX ORDER — MYCOPHENOLATE MOFETIL 250 MG/1
500 CAPSULE ORAL EVERY 12 HOURS
Status: DISCONTINUED | OUTPATIENT
Start: 2024-03-07 | End: 2024-03-13

## 2024-03-07 RX ORDER — ATORVASTATIN CALCIUM 80 MG/1
80 TABLET, FILM COATED ORAL
Status: DISCONTINUED | OUTPATIENT
Start: 2024-03-07 | End: 2024-03-16 | Stop reason: HOSPADM

## 2024-03-07 RX ORDER — SODIUM HYPOCHLORITE 1.25 MG/ML
SOLUTION TOPICAL 2 TIMES DAILY
Status: DISCONTINUED | OUTPATIENT
Start: 2024-03-07 | End: 2024-03-12 | Stop reason: ALTCHOICE

## 2024-03-07 RX ORDER — MAGNESIUM SULFATE HEPTAHYDRATE 40 MG/ML
4 INJECTION, SOLUTION INTRAVENOUS ONCE
Status: COMPLETED | OUTPATIENT
Start: 2024-03-07 | End: 2024-03-07

## 2024-03-07 RX ORDER — SODIUM CHLORIDE 9 MG/ML
500 INJECTION, SOLUTION INTRAVENOUS ONCE
Status: COMPLETED | OUTPATIENT
Start: 2024-03-07 | End: 2024-03-07

## 2024-03-07 RX ORDER — DEXTROSE MONOHYDRATE 25 G/50ML
25 INJECTION, SOLUTION INTRAVENOUS
Status: DISCONTINUED | OUTPATIENT
Start: 2024-03-07 | End: 2024-03-08

## 2024-03-07 RX ORDER — SODIUM CHLORIDE, SODIUM LACTATE, POTASSIUM CHLORIDE, CALCIUM CHLORIDE 600; 310; 30; 20 MG/100ML; MG/100ML; MG/100ML; MG/100ML
INJECTION, SOLUTION INTRAVENOUS CONTINUOUS
Status: DISCONTINUED | OUTPATIENT
Start: 2024-03-07 | End: 2024-03-09

## 2024-03-07 RX ORDER — METOPROLOL SUCCINATE 25 MG/1
25 TABLET, EXTENDED RELEASE ORAL DAILY
Status: DISCONTINUED | OUTPATIENT
Start: 2024-03-07 | End: 2024-03-16 | Stop reason: HOSPADM

## 2024-03-07 RX ORDER — GABAPENTIN 300 MG/1
600 CAPSULE ORAL 2 TIMES DAILY
Status: DISCONTINUED | OUTPATIENT
Start: 2024-03-07 | End: 2024-03-11

## 2024-03-07 RX ORDER — SODIUM CHLORIDE, SODIUM LACTATE, POTASSIUM CHLORIDE, AND CALCIUM CHLORIDE .6; .31; .03; .02 G/100ML; G/100ML; G/100ML; G/100ML
500 INJECTION, SOLUTION INTRAVENOUS
Status: COMPLETED | OUTPATIENT
Start: 2024-03-07 | End: 2024-03-07

## 2024-03-07 RX ORDER — POLYETHYLENE GLYCOL 3350 17 G/17G
1 POWDER, FOR SOLUTION ORAL
Status: DISCONTINUED | OUTPATIENT
Start: 2024-03-07 | End: 2024-03-16 | Stop reason: HOSPADM

## 2024-03-07 RX ADMIN — OMEPRAZOLE 20 MG: 20 CAPSULE, DELAYED RELEASE ORAL at 05:31

## 2024-03-07 RX ADMIN — INSULIN HUMAN 5 UNITS: 100 INJECTION, SOLUTION PARENTERAL at 21:13

## 2024-03-07 RX ADMIN — SODIUM CHLORIDE, POTASSIUM CHLORIDE, SODIUM LACTATE AND CALCIUM CHLORIDE: 600; 310; 30; 20 INJECTION, SOLUTION INTRAVENOUS at 10:25

## 2024-03-07 RX ADMIN — LINEZOLID 600 MG: 600 INJECTION, SOLUTION INTRAVENOUS at 06:14

## 2024-03-07 RX ADMIN — ATORVASTATIN CALCIUM 80 MG: 80 TABLET, FILM COATED ORAL at 03:26

## 2024-03-07 RX ADMIN — LINEZOLID 600 MG: 600 INJECTION, SOLUTION INTRAVENOUS at 20:08

## 2024-03-07 RX ADMIN — HYDROCORTISONE SODIUM SUCCINATE 100 MG: 100 INJECTION, POWDER, FOR SOLUTION INTRAMUSCULAR; INTRAVENOUS at 21:29

## 2024-03-07 RX ADMIN — MAGNESIUM SULFATE HEPTAHYDRATE 4 G: 4 INJECTION, SOLUTION INTRAVENOUS at 16:41

## 2024-03-07 RX ADMIN — CEFEPIME 2 G: 2 INJECTION, POWDER, FOR SOLUTION INTRAVENOUS at 05:34

## 2024-03-07 RX ADMIN — PREDNISONE 5 MG: 10 TABLET ORAL at 05:31

## 2024-03-07 RX ADMIN — SODIUM HYPOCHLORITE 473 ML: 1.25 SOLUTION TOPICAL at 21:03

## 2024-03-07 RX ADMIN — GABAPENTIN 600 MG: 300 CAPSULE ORAL at 20:14

## 2024-03-07 RX ADMIN — MYCOPHENOLATE MOFETIL 500 MG: 250 CAPSULE ORAL at 06:12

## 2024-03-07 RX ADMIN — SODIUM CHLORIDE, POTASSIUM CHLORIDE, SODIUM LACTATE AND CALCIUM CHLORIDE 500 ML: 600; 310; 30; 20 INJECTION, SOLUTION INTRAVENOUS at 07:24

## 2024-03-07 RX ADMIN — HYDROCORTISONE SODIUM SUCCINATE 100 MG: 100 INJECTION, POWDER, FOR SOLUTION INTRAMUSCULAR; INTRAVENOUS at 15:00

## 2024-03-07 RX ADMIN — GABAPENTIN 600 MG: 300 CAPSULE ORAL at 05:32

## 2024-03-07 RX ADMIN — TACROLIMUS 1 MG: 1 CAPSULE ORAL at 06:12

## 2024-03-07 RX ADMIN — ATORVASTATIN CALCIUM 80 MG: 80 TABLET, FILM COATED ORAL at 21:04

## 2024-03-07 RX ADMIN — SODIUM CHLORIDE, POTASSIUM CHLORIDE, SODIUM LACTATE AND CALCIUM CHLORIDE: 600; 310; 30; 20 INJECTION, SOLUTION INTRAVENOUS at 03:29

## 2024-03-07 RX ADMIN — SODIUM CHLORIDE 500 ML: 9 INJECTION, SOLUTION INTRAVENOUS at 08:12

## 2024-03-07 ASSESSMENT — COGNITIVE AND FUNCTIONAL STATUS - GENERAL
MOBILITY SCORE: 20
WALKING IN HOSPITAL ROOM: A LITTLE
STANDING UP FROM CHAIR USING ARMS: A LITTLE
CLIMB 3 TO 5 STEPS WITH RAILING: A LITTLE
SUGGESTED CMS G CODE MODIFIER DAILY ACTIVITY: CH
MOVING TO AND FROM BED TO CHAIR: A LITTLE
SUGGESTED CMS G CODE MODIFIER MOBILITY: CJ
DAILY ACTIVITIY SCORE: 24

## 2024-03-07 NOTE — ASSESSMENT & PLAN NOTE
S/p MI with 2 stents in 2011  Continue to hold aspirin 81 mg daily for ongoing NICKI drain and abscess management  Continue Atorvastatin 80 mg daily  Hold Metoprolol due to hypotension

## 2024-03-07 NOTE — PROGRESS NOTES
Pt presents to CT-4.   PT was consented by MD at bedside, confirmed by this RN.   Pt remained in bed  in supine position.   Pt placed on monitor, prepped and draped in a sterile fashion.   Patient underwent a Left axillary drain placement by Dr. Gutierrez.   Procedure site was marked by MD and verified using imaging guidance.   Vitals were taken and remained stable during procedure (see doc flow sheet for results).   Bedside report given to Lisa MONTENEGRO. Pt transported by stretcher with RN back to ED Blue.     Specimen: 7ml bloody, purulent left axillary fluid aspirate hand delivered to lab.  Total approx 410ml bloody, purulent fluid aspirate removed     Left axillary drain (12F x 25cm)  PrimÃ¢â‚¬â„¢Vision Flexima APDL locking pigtail drainage catheter system  REF: [id number]  LOT: [id number]  EXP: 2026-11-09

## 2024-03-07 NOTE — ASSESSMENT & PLAN NOTE
Imaging with large left axillary mass, necrotic adenopathy and central hypodensity consistent with necrosis/abscess  S/P IR drain placement on 3/7  Continue broad-spectrum antimicrobial chemotherapy with cefepime and linezolid pending culture results

## 2024-03-07 NOTE — ASSESSMENT & PLAN NOTE
Of left axilla per biopsy on 2/23/24    -Per oncology -  plan outpatient PET scan, immunotherapy and radiation therapy

## 2024-03-07 NOTE — OR SURGEON
Immediate Post- Operative Note        Findings: L axillary fluid collection.       Procedure(s): US guided drain placement.       Estimated Blood Loss: Less than 5 ml        Complications: None            3/7/2024     1216 PM     Live Gutierrez M.D.

## 2024-03-07 NOTE — CONSULTS
Surgical History and Physical    Date of Service: 3/7/2024    Requesting Physician: Scotty Byers MD - JOYCELYN    Reason for Consultation: Left Axillary Infection    HPI: This is a 68 y.o. male who is presenting with increasing left axillary swelling, pain, and erythema.  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  This was biopsied in late February after he noticed a lesion there.  The wife states the biopsy sites have not healed and have been slowly getting bigger.  He had an ultrasound 11/2023 that showed a complex fluid collection and a CT shoulder 2/2024 that showed a complex mass within the left axilla.  He was supposed to have an outpatient evaluation by a medical oncologist before these acute symptoms occurred.      PAST MEDICAL HISTORY:   Active Ambulatory Problems     Diagnosis Date Noted    History of MI (myocardial infarction) 06/26/2013    History of renal transplant 06/26/2013    Stage 3b chronic kidney disease (HCC) 06/26/2013    Benign essential hypertension 07/09/2018    Hyperlipidemia     S/P insertion of non-drug eluting coronary artery stent 07/08/2013    History of atrial fibrillation 07/08/2013    CAD (coronary artery disease) 07/08/2013    Type 2 diabetes mellitus with kidney complication, without long-term current use of insulin ([facility]) 01/09/2014    Anemia of chronic disease 09/28/2017    Thrombocytopenia ([facility]) 10/20/2020    GERD (gastroesophageal reflux disease) 07/09/2018    Polycystic kidney 07/09/2018    Gout 07/09/2018    Arthralgia 05/26/2021    Diabetes mellitus with coincident hypertension ([facility]) 12/16/2021    Type 2 diabetes mellitus with hyperlipidemia ([facility]) 12/16/2021    PAF (paroxysmal atrial fibrillation) ([facility]) 04/05/2022    Acute on chronic systolic heart failure ([facility]) 05/26/2022    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) 07/29/2022    Abdominal aortic aneurysm (AAA) without rupture ([facility]) 08/09/2022    Long term (current) use of anticoagulants 10/24/2022    Cerebral atrophy (HCC) 12/06/2022    Atherosclerosis of aorta ([facility]) 12/06/2022    Hypercoagulability due to atrial fibrillation ([facility]) 01/17/2023    Cognitive communication deficit 07/06/2022    Difficulty in walking, not elsewhere classified 07/06/2022    Extended spectrum beta lactamase (ESBL) resistance 07/06/2022    Long term (current) use of systemic steroids 06/22/2022    Muscle weakness (generalized) 07/06/2022    Old myocardial infarction 06/22/2022    Other reduced mobility 07/06/2022    Type 2 diabetes mellitus with diabetic neuropathy, unspecified ([facility]) 06/22/2022    Hypertensive heart and chronic kidney disease with heart failure and stage 1 through stage 4 chronic kidney disease, or unspecified chronic kidney disease ([facility]) 02/09/2023    Functional diarrhea 02/09/2023    Pain in both feet 09/19/2023    Axillary mass, left 11/14/2023    Acute recurrent maxillary sinusitis 12/26/2023    Primary insomnia 02/23/2024    Neoplasm of uncertain behavior of soft tissue 02/23/2024    Presence of Watchman left atrial appendage closure device 02/29/2024     Resolved Ambulatory Problems     Diagnosis Date Noted    Diabetes mellitus ([facility]) 06/26/2013    Weakness 03/04/2014    Pneumonia 09/27/2017    Sepsis(995.91) 09/27/2017    SANKET (acute kidney injury) ([facility]) 09/27/2017    COVID-19 11/13/2020    Right hip pain 01/20/2021    Right upper quadrant abdominal pain 10/28/2021    Overweight (BMI 25.0-29.9) 12/16/2021    Advanced care planning/counseling discussion 12/16/2021    Bronchitis 01/17/2022    Septic shock ([facility]) 05/24/2022    Acute respiratory failure with hypoxia ([facility]) 05/26/2022    Bacteremia due to Escherichia coli 05/26/2022    Acute metabolic encephalopathy 05/30/2022    Cardiac arrest ([facility]) 05/30/2022    Knee effusion, right  05/30/2022    Hyperkalemia 06/10/2022    Cellulitis of right lower extremity 06/14/2022    Dysphagia 06/14/2022    Wound of right lower extremity 06/16/2022    Septic shock (HCC) 07/01/2022    Urinary tract infection 07/01/2022    Gram-negative bacteremia 07/02/2022    Septic shock (HCC) 07/25/2022    Hospital discharge follow-up 08/09/2022    Breast tenderness in male 12/06/2022    Viral gastroenteritis 12/06/2022    Unspecified open wound, right lower leg, subsequent encounter 06/22/2022    Acute non-recurrent maxillary sinusitis 02/09/2023    Allergic conjunctivitis of both eyes 03/10/2023     Past Medical History:   Diagnosis Date    A-fib ([facility])     Arrhythmia     Diabetes ([facility])     Heart burn     Hemorrhagic disorder ([facility])     Hyperlipoproteinemia     Hypertension     Indigestion     Myocardial infarct ([facility]) 2013    Sleep apnea 01/30/2024    Snoring        PAST SURGICAL HISTORY:   Past Surgical History:   Procedure Laterality Date    STENT PLACEMENT  2013    cardiac    KNEE MANIPULATION  02/16/2012    Performed by LATOYA CONNER at SURGERY [facility]    KNEE UNICOMPARTMENTAL  12/23/2011    Performed by LATOYA CONNER at SURGERY [facility]    KNEE ARTHROSCOPY  12/23/2011    Performed by LATOYA CONNER at SURGERY [facility]    KNEE ARTHROSCOPY  05/03/2011    Performed by HANANE GOLDMAN at SURGERY [facility]    MENISCECTOMY, KNEE, MEDIAL  05/03/2011    Performed by HANANE GOLDMAN at SURGERY [facility]    OTHER  09/10/2010    RIGHT KIDNEY TRANSPLANT    KNEE ARTHROPLASTY TOTAL  01/12/2007    RIGHT    KNEE ARTHROSCOPY  04/10/2006    RIGHT    OTHER ORTHOPEDIC SURGERY  07/08/1974    LEFT KNEE DEBRIDEMENT       ALLERGIES: Doxycycline       CURRENT MEDICATIONS:   Home Medications       Reviewed by Dax Henriquez R.N. (Registered Nurse) on 03/06/24 at 2002  Med List Status: Partial     Medication Last Dose Status   aspirin 81 MG EC tablet  Active   atorvastatin (LIPITOR) 80 MG tablet   Active   gabapentin (NEURONTIN) 300 MG Cap  Active   glyBURIDE (DIABETA) 5 MG Tab  Active   linagliptin (TRADJENTA) 5 MG Tab tablet  Active   metoprolol SR (TOPROL XL) 25 MG TABLET SR 24 HR  Active   mycophenolate (CELLCEPT) 500 MG tablet  Active   omeprazole (PRILOSEC) 20 MG delayed-release capsule  Active   pioglitazone (ACTOS) 45 MG Tab  Active   predniSONE (DELTASONE) 5 MG Tab  Active   tacrolimus (PROGRAF) 1 MG Cap  Active   tadalafil (CIALIS) 5 MG tablet  Active                     FAMILY HISTORY:   Reviewed and found to be non-contributory in regards to the above presentation    SOCIAL HISTORY:  reports that he has never smoked. He has never been exposed to tobacco smoke. He has never used smokeless tobacco. He reports that he does not drink alcohol and does not use drugs.      Review of Systems:  Constitutional: Negative for fever, chills, weight loss, malaise/fatigue and diaphoresis.   HENT: Negative for hearing loss, ear pain, nosebleeds, congestion, sore throat, neck pain, tinnitus and ear discharge.    Eyes: Negative for blurred vision, double vision, photophobia, pain, discharge and redness.   Respiratory: Negative for cough, hemoptysis, sputum production, shortness of breath, wheezing and stridor.    Cardiovascular: Negative for chest pain, palpitations, orthopnea, claudication, leg swelling and PND.   Gastrointestinal: Negative for heartburn, nausea, vomiting, abdominal pain, diarrhea, constipation, blood in stool and melena.   Genitourinary: Negative for dysuria, urgency, frequency, hematuria and flank pain.   Musculoskeletal: Negative for myalgias, back pain, joint pain and falls.   Skin: Negative for itching and rash.  Neurological: Negative for dizziness, tingling, tremors, sensory change, speech change, focal weakness, seizures, loss of consciousness, weakness and headaches.   Endo/Heme/Allergies: Negative for environmental allergies and polydipsia. Does not bruise/bleed easily.  "  Psychiatric/Behavioral: Negative for depression, suicidal ideas, hallucinations, memory loss and substance abuse. The patient is not nervous/anxious and does not have insomnia.    Physical Exam:  /68   Pulse (!) 109   Temp 36.7 °C (98.1 °F) (Oral)   Resp 12   Ht 1.956 m (6' 5\")   Wt 107 kg (235 lb)   SpO2 95%   Vitals:    03/07/24 0030   BP: 101/68   Pulse: (!) 109   Resp: 12   Temp:    SpO2: 95%     GENERAL:  Ill appearing and in no acute distress  CHEST:  Lungs are clear to auscultation bilaterally.  No masses, lesions, or signs of trauma were noted.  There is a large, firm, erythematous mass overlying the left axilla.  There is a 3cm central necrotic wound that is foul smelling.  No drainage.    CARDIOVASCULAR:  Regular rate and rhythm.  No murmurs appreciated.  No JVD.  Palpable pulses present in all four extremities.    ABDOMEN:  Soft, non-tender, non-distended.  Non-tympanitic.  No hepatomegaly or splenomegaly.  No incisional, umbilical, or inguinal hernias were appreciated.  MUSCULOSKELETAL: Normal range of motion x4 extremities.    SKIN:  Warm and well perfused. No rashes.  NEUROLOGIC:  Alert and oriented. Cranial nerves II through XII are grossly intact. Motor and sensory exams are normal in all four extremities. Motor and sensory reflexes are 2+ and symmetric with bilateral plantar responses.  PSYCHIATRIC: Affect and mood is appropriate for age and condition.    Labs:  Recent Labs     03/06/24 2025   WBC 21.2*   RBC 5.05   HEMOGLOBIN 15.0   HEMATOCRIT 45.0   MCV 89.1   MCH 29.7   MCHC 33.3   RDW 47.2   PLATELETCT 121*   MPV 12.3     Recent Labs     03/06/24 2025   SODIUM 139   POTASSIUM 5.4   CHLORIDE 102   CO2 19*   GLUCOSE 119*   BUN 44*   CREATININE 2.60*   CALCIUM 9.7         Recent Labs     03/06/24 2025   ASTSGOT 19   ALTSGPT 24   TBILIRUBIN 1.4   ALKPHOSPHAT 86   GLOBULIN 2.8       Radiology:  CT-CHEST (THORAX) W/O   Final Result      1.  Partially visualized large left axillary " mass and necrotic adenopathy is consistent with known squamous cell carcinoma. Central hypodensity in the larger mass may be related to necrosis.      2.  0.9 cm pleural-based nodule in the right lower lobe is nonspecific, possibly present on the prior exam where there were patchy opacities. Follow-up per oncology protocol      3.  Patchy groundglass density and subsegmental atelectasis appears slightly improved from the prior CT. Findings are consistent with a chronic inflammatory/infectious process.      Fleischner Society pulmonary nodule recommendations:   Not Applicable             Assessment/Plan:   1) Left Axillary Squamous Cell Carcinoma with superimposed infection:    This is a complicated issue not only due to the presence of the SCC, but he is also on immune modulating agents for his renal transplant.  The mass, may have a fluid component that has become infected.  Doing a surgical incision and drainage in this area may allow evacuation of the infection, but ultimately leave him with an even larger wound given the resident SCC.  I discussed this with his wife.    -Broad spectrum antibiotics  -IR consult for aspiration vs drain placement  -TEG with platelet mapping ordered    Aggregated care time spent evaluating, reassessing, reviewing documentation, providing care, and managing this patient exclusive of procedures: 55 minutes  ____________________________________   Jeffery Buckner MD, FACS   JRU / NTS     DD: 3/7/2024   DT: 1:14 AM

## 2024-03-07 NOTE — ASSESSMENT & PLAN NOTE
History of, in SR, s/p watchman procedure on 1/2024, off anticoagulation  Metoprolol on hold due to low bp and adrenal insufficiency   Monitor HR

## 2024-03-07 NOTE — ED NOTES
Assist RN: Pt returned from IR. Placed on the monitor. Call light within reach. Drain in place, ~30 cc output.

## 2024-03-07 NOTE — ED PROVIDER NOTES
CHIEF COMPLAINT  Chief Complaint   Patient presents with    Flu Like Symptoms     C/o flu like symptoms since yesterday. + body aches and chills. + lightheadedness.     Wound Check     Also c/o left wound possible infection. Skin cancer ( left sided near axilla )        LIMITATION TO HISTORY   Select:     HPI    Jama Altman is a 68 y.o. male who presents to the Emergency Department a history of diabetes, polycystic kidney disease and had a subsequent renal transplant in 2010 and has been on immunosuppressant presents tonight for evaluation of swelling and pain in his left axilla patient is history of squamous cell carcinoma was supposed to get excision and removal by general surgery yesterday however this was postponed with surgeon wanted further evaluation by oncology.    OUTSIDE HISTORIAN(S):  Select:    EXTERNAL RECORDS REVIEWED  Select: H&P 2022 a history of diabetes, polycystic kidney disease and had a subsequent renal transplant in 2010 and has been on immunosuppressant       PAST MEDICAL HISTORY  Past Medical History:   Diagnosis Date    A-fib (HCC)     Arrhythmia     Benign essential hypertension     Diabetes (HCC)     type 2    Heart burn     Hemorrhagic disorder (HCC)     Eliquis    Hyperlipoproteinemia     Hypertension     not on meds anymore    Indigestion     Myocardial infarct (HCC) 2013    stent    Polycystic kidney 09/10/2010    RIGHT KIDNEY TRANSPLANT    Presence of Watchman left atrial appendage closure device 02/29/2024    Sleep apnea 01/30/2024    states he was told he had sleep apnea but has not had a sleep study    Snoring      .    SURGICAL HISTORY  Past Surgical History:   Procedure Laterality Date    STENT PLACEMENT  2013    cardiac    KNEE MANIPULATION  02/16/2012    Performed by LATOYA CONNER at SURGERY [facility]    KNEE UNICOMPARTMENTAL  12/23/2011    Performed by LATOYA CONNER at SURGERY [facility]    KNEE ARTHROSCOPY  12/23/2011    Performed by LATOYA CONNER at  [facility]ERY [facility]    KNEE ARTHROSCOPY  05/03/2011    Performed by HANANE GOLDMAN at SURGERY SAME DAY [facility]    MENISCECTOMY, KNEE, MEDIAL  05/03/2011    Performed by HANANE GOLDMAN at SURGERY SAME DAY [facility]    OTHER  09/10/2010    RIGHT KIDNEY TRANSPLANT    KNEE ARTHROPLASTY TOTAL  01/12/2007    RIGHT    KNEE ARTHROSCOPY  04/10/2006    RIGHT    OTHER ORTHOPEDIC SURGERY  07/08/1974    LEFT KNEE DEBRIDEMENT         FAMILY HISTORY  History reviewed. No pertinent family history.       SOCIAL HISTORY  Social History     Socioeconomic History    Marital status: Single     Spouse name: Not on file    Number of children: Not on file    Years of education: Not on file    Highest education level: Not on file   Occupational History    Not on file   Tobacco Use    Smoking status: Never     Passive exposure: Never    Smokeless tobacco: Never   Vaping Use    Vaping Use: Never used   Substance and Sexual Activity    Alcohol use: No    Drug use: No    Sexual activity: Yes     Partners: Female   Other Topics Concern    Not on file   Social History Narrative    Not on file     Social Determinants of Health     Financial Resource Strain: Not on file   Food Insecurity: Not on file   Transportation Needs: Not on file   Physical Activity: Not on file   Stress: Not on file   Social Connections: Not on file   Intimate Partner Violence: Not on file   Housing Stability: Not on file         CURRENT MEDICATIONS  No current facility-administered medications on file prior to encounter.     Current Outpatient Medications on File Prior to Encounter   Medication Sig Dispense Refill    metoprolol SR (TOPROL XL) 25 MG TABLET SR 24 HR Take 1 Tablet by mouth every day. May take additional one tab daily for heart rate sustaining >110 BPM. 100 Tablet 3    aspirin 81 MG EC tablet Take 1 Tablet by mouth every day. 100 Tablet 1    pioglitazone (ACTOS) 45 MG Tab TAKE ONE TABLET BY MOUTH ONCE DAILY 100 Tablet 3    gabapentin (NEURONTIN)  "300 MG Cap TAKE 2 CAPSULES BY MOUTH TWICE DAILY 360 Capsule 1    linagliptin (TRADJENTA) 5 MG Tab tablet Take 1 Tablet by mouth every day. 100 Tablet 3    omeprazole (PRILOSEC) 20 MG delayed-release capsule Take 1 Capsule by mouth every day. 90 Capsule 3    mycophenolate (CELLCEPT) 500 MG tablet Take 500 mg by mouth 2 times a day.      glyBURIDE (DIABETA) 5 MG Tab Take 2 Tablets by mouth 2 times a day with meals. 400 Tablet 3    predniSONE (DELTASONE) 5 MG Tab Take 1 Tablet by mouth every day. 30 Tablet 0    tacrolimus (PROGRAF) 1 MG Cap Take 1 Capsule by mouth 2 times a day. (Patient taking differently: Take 1 mg by mouth 2 times a day. 0800  2000) 60 Capsule 3    atorvastatin (LIPITOR) 80 MG tablet Take 1 Tablet by mouth at bedtime. 100 Tablet 3    tadalafil (CIALIS) 5 MG tablet : TAKE 1 TABLETS 30 MINUTES BEFORE SEXUAL ACTIVITY, DO NOT EXCEED MORE THAN ONE DOSE A DAY 15 Tablet 3           ALLERGIES  Allergies   Allergen Reactions    Doxycycline Rash     Sweats and shakes: 9/28/17: Clarified allergy with patient. Allergy was in 1998 and he doesn't remember what happened. He thought the medication is for pain.  Tolerates doxycycline 9/2017       PHYSICAL EXAM  VITAL SIGNS:/75   Pulse (!) 111   Temp 36.7 °C (98.1 °F) (Oral)   Resp (!) 28   Ht 1.956 m (6' 5\")   Wt 107 kg (235 lb)   SpO2 96%   BMI 27.87 kg/m²       GENERAL: Awake and alert  HEAD: Normocephalic and atraumatic  NECK: Normal range of motion, without meningismus  EYES: Pupils Equal, Round, Reactive to Light, extraocular movements intact, conjunctiva white  ENT: Mucous membranes moist, oropharynx clear  PULMONARY: Normal effort, clear to auscultation  CARDIOVASCULAR: No murmurs, clicks or rubs, peripheral pulses 2+  CHEST: There is a baseball sized mass in the left chest wall with central necrotic area approximately half dollar size with 4 cm surrounding erythema warmth and tenderness to palpation   ABDOMINAL: Soft, non-tender, no guarding or " rigidity present, no pulsatile masses  BACK: no midline tenderness, no costovertebral tenderness  NEUROLOGICAL: Grossly non-focal neurological examination, speech normal, gait normal  EXTREMITIES: No edema, normal to inspection  SKIN: Warm and dry.  PSYCHIATRIC: Affect is appropriate    DIAGNOSTIC STUDIES / PROCEDURES  EKG  CRITICAL CARE  The very real possibilty of a deterioration of this patient's condition required the highest level of my preparedness for sudden, emergent intervention.  I provided critical care services, which included medication orders, frequent reevaluations of the patient's condition and response to treatment, ordering and reviewing test results, and discussing the case with various consultants.  The critical care time associated with the care of the patient was 33 minutes. Review chart for interventions. This time is exclusive of any other billable procedures.       EKG Interpretation: I independently reviewed the below EKG and did not see signs of a STEMI  Results for orders placed or performed during the hospital encounter of 24   EKG   Result Value Ref Range    Report       [facility] Emergency Dept.    Test Date:  2024  Pt Name:    CIARA FORRESTER                   Department: ER  MRN:        [id number]                      Room:       [facility]  Gender:     Male                         Technician: raisa  :        [id number]                   Requested By:STEPHANIE VALDEZ  Order #:    [id number]                    Reading MD:    Measurements  Intervals                                Axis  Rate:       107                          P:          0  MN:         0                            QRS:        81  QRSD:       150                          T:          1  QT:         339  QTc:        453    Interpretive Statements  Atrial fibrillation  Right bundle branch block  Compared to ECG 2024 08:59:28  Sinus tachycardia no longer present  Myocardial infarct finding no longer  present             LABS  Labs Reviewed   LACTIC ACID - Abnormal; Notable for the following components:       Result Value    Lactic Acid 3.5 (*)     All other components within normal limits   LACTIC ACID - Abnormal; Notable for the following components:    Lactic Acid 2.4 (*)     All other components within normal limits    Narrative:     Repeat if initial lactic acid is greater than 2   CBC WITH DIFFERENTIAL - Abnormal; Notable for the following components:    WBC 21.2 (*)     Platelet Count 121 (*)     Neutrophils-Polys 88.20 (*)     Lymphocytes 1.90 (*)     Immature Granulocytes 1.40 (*)     Neutrophils (Absolute) 18.66 (*)     Lymphs (Absolute) 0.41 (*)     Monos (Absolute) 1.74 (*)     Immature Granulocytes (abs) 0.29 (*)     All other components within normal limits   COMP METABOLIC PANEL - Abnormal; Notable for the following components:    Co2 19 (*)     Anion Gap 18.0 (*)     Glucose 119 (*)     Bun 44 (*)     Creatinine 2.60 (*)     All other components within normal limits   ESTIMATED GFR - Abnormal; Notable for the following components:    GFR (CKD-EPI) 26 (*)     All other components within normal limits   PHOSPHORUS - Abnormal; Notable for the following components:    Phosphorus 1.9 (*)     All other components within normal limits   MAGNESIUM - Abnormal; Notable for the following components:    Magnesium 1.2 (*)     All other components within normal limits   PLATELET MAPPING WITH BASIC TEG - Abnormal; Notable for the following components:    % Inhibition ADP 95.2 (*)     % Inhibition AA 91.8 (*)     All other components within normal limits    Narrative:     Do you want to extend TEG graph to LY30? (If no, graph will                  terminate at MA)->Yes   PROTHROMBIN TIME - Abnormal; Notable for the following components:    PT 16.7 (*)     INR 1.34 (*)     All other components within normal limits    Narrative:     If not done within the last 4 hours                  Indicate which anticoagulants  the patient is on:->UNKNOWN   PROBRAIN NATRIURETIC PEPTIDE, NT - Abnormal; Notable for the following components:    NT-proBNP 99986 (*)     All other components within normal limits   LACTIC ACID    Narrative:     Repeat if initial lactic acid is greater than 2   URINE CULTURE(NEW)   BLOOD CULTURE    Narrative:     Blood Cultures X2. Draw one blood culture from central line                  and one blood culture peripherally. If no line present, draw                  blood cultures times two peripherally from different sites.   BLOOD CULTURE    Narrative:     Blood Cultures X2. Blood Cultures X2. Draw one blood culture                  from central line and one blood culture peripherally. If no                  line present, draw blood cultures times two peripherally from                  different sites.   MRSA BY PCR (AMP)    Narrative:     If not done within the last 24 hours   URINALYSIS   POCT COV-2, FLU A/B, RSV BY PCR   POC COV-2, FLU A/B, RSV BY PCR         RADIOLOGY  I independently reviewed and interpreted the images obtained today in the ER.    Radiologist interpretation:   CT-CHEST (THORAX) W/O   Final Result      1.  Partially visualized large left axillary mass and necrotic adenopathy is consistent with known squamous cell carcinoma. Central hypodensity in the larger mass may be related to necrosis.      2.  0.9 cm pleural-based nodule in the right lower lobe is nonspecific, possibly present on the prior exam where there were patchy opacities. Follow-up per oncology protocol      3.  Patchy groundglass density and subsegmental atelectasis appears slightly improved from the prior CT. Findings are consistent with a chronic inflammatory/infectious process.      Fleischner Society pulmonary nodule recommendations:   Not Applicable         IR-CONSULT AND TREAT    (Results Pending)        COURSE & MEDICAL DECISION MAKING    ED COURSE:        INTERVENTIONS BY ME:  Medications   atorvastatin (Lipitor) tablet  80 mg (80 mg Oral Given 3/7/24 0326)   gabapentin (Neurontin) capsule 600 mg (has no administration in time range)   metoprolol SR (Toprol XL) tablet 25 mg (has no administration in time range)   mycophenolate (Cellcept) capsule 500 mg (has no administration in time range)   omeprazole (PriLOSEC) capsule 20 mg (has no administration in time range)   predniSONE (Deltasone) tablet 5 mg (has no administration in time range)   tacrolimus (Prograf) capsule 1 mg (has no administration in time range)   cefepime (Maxipime) 2 g in  mL IVPB (has no administration in time range)   Linezolid (Zyvox) premix 600 mg (has no administration in time range)   lactated ringers infusion ( Intravenous New Bag 3/7/24 0329)   acetaminophen (Tylenol) tablet 650 mg (has no administration in time range)   senna-docusate (Pericolace Or Senokot S) 8.6-50 MG per tablet 2 Tablet (has no administration in time range)     And   polyethylene glycol/lytes (Miralax) Packet 1 Packet (has no administration in time range)   ondansetron (Zofran) syringe/vial injection 4 mg (has no administration in time range)   ondansetron (Zofran ODT) dispertab 4 mg (has no administration in time range)   LR (Bolus) infusion 500 mL (has no administration in time range)   morphine 4 MG/ML injection 4 mg (4 mg Intravenous Given 3/6/24 2040)   ondansetron (Zofran) syringe/vial injection 4 mg (4 mg Intravenous Given 3/6/24 2040)   cefTRIAXone (Rocephin) injection 2,000 mg (2,000 mg Intravenous Given 3/6/24 2223)   NS (Bolus) 0.9 % infusion 1,000 mL (0 mL Intravenous Stopped 3/6/24 2323)   NS (Bolus) 0.9 % infusion 1,000 mL (0 mL Intravenous Stopped 3/7/24 0103)   vancomycin (Vancocin) 2,750 mg in  mL IVPB (0 mg Intravenous Stopped 3/7/24 0214)       Response on recheck:  8:05 PM patient hypotensive receiving fluid bolus clinically well-appearing has become tachycardic, will order ceftriaxone  10:20 PM patient still hypotensive ordered additional fluid bolus,  will reassess patient after second fluid I do have have some concern for septic shock Discussed workup and test results so far, will continue to monitor patient reassess volume status evaluate for vasopressors versus clinical response to fluids.  Patient is somewhat somnolent unclear if this is secondary to hypotension or      INITIAL ASSESSMENT, COURSE AND PLAN  Care Narrative:   Patient presenting with infectious process in the left axilla, septic protocol initiated, CT scan obtained given his history of CKD and renal transplant contrast was not provided, CT scan did not reveal any obvious abscess I did consult general surgery to evaluate patient for clinical correlation of abscess, patient provided antibiotics 2 L fluid bolus.  Patient was hypotensive warranting immediate resuscitation with crystalloid fluids.  Fortunately his hypotension resolved after fluid resuscitation and did consider provision of vasopressors as patient has a history of chronic kidney disease and fluid overload.    Workup notable for a leukocytosis to 21,000 in addition to acute kidney injury and lactic acidosis.               ADDITIONAL PROBLEM LIST    DISPOSITION AND DISCUSSIONS  Discussion of management with other Q or appropriate source(s): None     I have discussed management of the patient with the following physicians and LINDA's: Spoke to his surgeon Dr. Lopez who said they will consult on the case.  Spoke to [facility] resident who expressed suspected admission for Dr. Nielsen        FINAL DIAGNOSIS  1. Acute kidney injury (HCC)    2. Cellulitis of left axilla    3. Severe sepsis (HCC)    4. Hypotension, unspecified hypotension type             Electronically signed by: Scotty Byers DO ,4:20 AM 03/06/24

## 2024-03-07 NOTE — ASSESSMENT & PLAN NOTE
A1C 2/16/2024 6.9  - Elevated glucose secondary to stress dosing of steroids  - Glucose ranging 200-350s  - Tapering steroid down, continue lantus once daily with SSI   -  Plan to restart home medications at discharge   - Monitor BG trend.   - Accu-Cheks before meals and at bedtime.   - Goal to keep BG between 140-180 per 2019 ADA guidelines

## 2024-03-07 NOTE — ED NOTES
Bedside report received from off going RN/tech: Kody RN, assumed care of patient.  POC discussed with patient. Call light within reach, all needs addressed at this time.       Fall risk interventions in place: Patient's personal possessions are with in their safe reach, Place fall risk sign on patient's door, Give patient urinal if applicable, and Keep floor surfaces clean and dry (all applicable per [place] Fall risk assessment)   Continuous monitoring: Cardiac Leads, Pulse Ox, or Blood Pressure  IVF/IV medications: Infusion per MAR (List Med(s)) LR Bolus  Oxygen: How many liters 4L  Bedside sitter: Not Applicable   Isolation: Not Applicable      Pt's BP 79/54, per NOC RN to notify admitting anything <80s.  PRN LR bolus initiated per MAR, Dr Zhou messaged via [employer] of updated BP and aware initiated PRN IVF.

## 2024-03-07 NOTE — ED NOTES
IVF slowed per pt and spouse request as spouse states pt has hx of fluid overload and BP now 98/63.   Pt's L axillary area appears more red/swollen in the last hour.  Dr Zhou aware via [employer] and states he spoke w/ surgeon and will update pt/family.

## 2024-03-07 NOTE — ASSESSMENT & PLAN NOTE
History of renal transplant in 2010 for polycystic kidney disease  Hold mycophenolate and tacrolimus for now due to presence of sepsis with shock  Stop prednisone begin hydrocortisone, 100 mg IV every 8 hours

## 2024-03-07 NOTE — ED TRIAGE NOTES
Jama Altman  68 y.o.  male  Chief Complaint   Patient presents with    Flu Like Symptoms     C/o flu like symptoms since yesterday. + body aches and chills. + lightheadedness.     Wound Check     Also c/o left wound possible infection. Skin cancer ( left sided near axilla )      + SOB with exertion

## 2024-03-07 NOTE — ASSESSMENT & PLAN NOTE
2/2 polycystic kidney disease s/p right renal transplant in 2010  - Nephrology following  - Resume Tacrolimus  - Hold Cellcept due to active cancer  - Finish stress dosing of steroids; resume post transplant home prednisone 5mg daily after

## 2024-03-07 NOTE — ED NOTES
"  Pharmacy Medication Reconciliation      ~Medication reconciliation updated and complete per patient and family at bedside.   ~Allergies have been verified and updated   ~Pt reports taking one dose of \"keflex\" 3/6 but Pt doesn't \"specific regimen\" in place. From previous RX.   ~[facility] home pharmacy :  [facility]      ~Anticoagulants (rivaroxaban, apixaban, edoxaban, dabigatran, warfarin, enoxaparin) taken in the last 14 days? NO  ~Anticoagulant: , N/ALast dose: N/A            "

## 2024-03-07 NOTE — ASSESSMENT & PLAN NOTE
Improving   Creatinine 1.91 (baseline Cr ~2.0)  U/S renal did not show any hydronephrosis nor any signs of active rejection    - Hold cellcept indefinetly due to active cancer  - Continue tacrolimus  - Continue stress steroids; discuss duration with team tomorrow  - Increase IV lasix to 80mg as blood pressure allows  - Daily BMP to monitor for electrolyte abnormalities in the setting of forced diuresis   - Resume home prednisone 5mg after stress steroids  - Nephrology following

## 2024-03-07 NOTE — ED NOTES
Patient medicated as reflected in MAR. Patient verbalized understanding of all education provided. Patient tolerated medication at this time. Patient requesting pain medication for pain in his left shoulder. MD made aware via voalte.

## 2024-03-07 NOTE — ED NOTES
Care reassumed.  BP 88/54, last BPs reported to admitting MD by break RN.  Bed assigned to tele 8.  Messaged MD Zhou to update on current bp and clarify floor.  Pt and sig other updated, MD to come re-evaluate patient.

## 2024-03-07 NOTE — ASSESSMENT & PLAN NOTE
Monitor renal function and urine output  Avoid nephrotoxins and renal dose medications  IV fluid resuscitation

## 2024-03-07 NOTE — H&P
[facility] Internal Medicine History & Physical Note    Date of Service  3/7/2024    [facility] Team: SOURAV   Attending: Dr. Story  Senior Resident: Dr. English    Contact Number: [phone]    Primary Care Physician  Ganesh Benton III, M.D.    Consultants  cardiology    Specialist Names: Aleta Talamantes APRN    Code Status  Full Code    Chief Complaint  Chief Complaint   Patient presents with    Flu Like Symptoms     C/o flu like symptoms since yesterday. + body aches and chills. + lightheadedness.     Wound Check     Also c/o left wound possible infection. Skin cancer ( left sided near axilla )        History of Presenting Illness (HPI):     Patient is a 68 y.o. 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 Coli bacteremia resolved with meropenem, left axillary mass s/p biopsy on 2/23/24 showing SCC, who presented to the ED on 3/6/2024 for flu like symptoms.    Fiance present in room. Had biopsy done by Dr. Desai, dermatology, about a month ago and would has been there since. Has been worsening, has purulent drainage, surround erythema, swelling. Has associated chills that started last night, generalized body aches, weakness, SOB (started a week ago), SHAW, nausea   . Denies vomiting, abdominal pain, diarrhea, constipation, chest pain, dysuria, LE swelling. Denies any family history of cancers. Not established with oncology, was suppose to see one on Friday. Has sepsis 2 years ago per history as above.     Denies any smoking, alcohol use, nor recreational drug use. Lives alone with son nearby for social support.    In the ED, tachycardic 100-110s, tachypnic 20s, hypotensive SBP 80s,  98% on 4 LNC.  WBC 21.2, .  K5.4, CO2 19, AG 18, LA 3.5, BUNs/CR 44/2.60.  Viral respiratory panel negative.  CT chest without showing partially visualized large left axillary mass and necrotic adenopathy consistent with known SCC.  Patchy groundglass density and subsegmental atelectasis, consistent with chronic inflammatory/infectious process.  Patient received ondansetron 4 mg IVP, NS 1 LX 2, morphine 4, IVP, ceftriaxone 2 G IVP in the ED.      I discussed the plan of care with patient, family, bedside RN, and pharmacy.    Review of Systems  ROS  Review of systems (+12 systems) unremarkable except for concerns noted by patient or items listed.     Please see HPI for additional ROS.    Past Medical History   has a past medical history of A-fib (HCC), Arrhythmia, Benign essential hypertension, Diabetes (HCC), Heart burn, Hemorrhagic disorder (HCC), Hyperlipoproteinemia, Hypertension, Indigestion, Myocardial infarct (HCC) (2013), Polycystic kidney (09/10/2010), Presence of Watchman left atrial appendage closure device (02/29/2024), Sleep apnea (01/30/2024), and Snoring.    Surgical History   has a past surgical history that includes knee arthroplasty total (01/12/2007); knee arthroscopy (04/10/2006); other orthopedic surgery (07/08/1974); other (09/10/2010); knee arthroscopy (05/03/2011); meniscectomy, knee, medial (05/03/2011); knee unicompartmental (12/23/2011); knee arthroscopy (12/23/2011); knee manipulation (02/16/2012); and stent placement (2013).     Family History  family history is not on file.   Family history reviewed with patient.     Social History  Tobacco: Please see HPI  Alcohol: Please see HPI  Recreational drugs (illegal or prescription): Please see HPI  Employment: Please see HPI  Living Situation: Please see HPI  Recent Travel: Please see HPI  Primary Care Provider: Reviewed    Allergies  Allergies   Allergen Reactions    Doxycycline Rash     Sweats and shakes: 9/28/17: Clarified allergy  with patient. Allergy was in 1998 and he doesn't remember what happened. He thought the medication is for pain.  Tolerates doxycycline 9/2017       Medications  Prior to Admission Medications   Prescriptions Last Dose Informant Patient Reported? Taking?   aspirin 81 MG EC tablet   No No   Sig: Take 1 Tablet by mouth every day.   atorvastatin (LIPITOR) 80 MG tablet   No No   Sig: Take 1 Tablet by mouth at bedtime.   gabapentin (NEURONTIN) 300 MG Cap  Patient No No   Sig: TAKE 2 CAPSULES BY MOUTH TWICE DAILY   glyBURIDE (DIABETA) 5 MG Tab  Patient No No   Sig: Take 2 Tablets by mouth 2 times a day with meals.   linagliptin (TRADJENTA) 5 MG Tab tablet  Patient No No   Sig: Take 1 Tablet by mouth every day.   metoprolol SR (TOPROL XL) 25 MG TABLET SR 24 HR   No No   Sig: Take 1 Tablet by mouth every day. May take additional one tab daily for heart rate sustaining >110 BPM.   mycophenolate (CELLCEPT) 500 MG tablet  Patient Yes No   Sig: Take 500 mg by mouth 2 times a day.   omeprazole (PRILOSEC) 20 MG delayed-release capsule  Patient No No   Sig: Take 1 Capsule by mouth every day.   pioglitazone (ACTOS) 45 MG Tab  Patient No No   Sig: TAKE ONE TABLET BY MOUTH ONCE DAILY   predniSONE (DELTASONE) 5 MG Tab  MAR from Other Facility, Patient No No   Sig: Take 1 Tablet by mouth every day.   tacrolimus (PROGRAF) 1 MG Cap  Patient No No   Sig: Take 1 Capsule by mouth 2 times a day.   Patient taking differently: Take 1 mg by mouth 2 times a day. 0800  2000   tadalafil (CIALIS) 5 MG tablet   No No   Sig: : TAKE 1 TABLETS 30 MINUTES BEFORE SEXUAL ACTIVITY, DO NOT EXCEED MORE THAN ONE DOSE A DAY      Facility-Administered Medications: None       Physical Exam  Temp:  [36.7 °C (98.1 °F)] 36.7 °C (98.1 °F)  Pulse:  [] 111  Resp:  [12-22] 22  BP: ()/(50-76) 97/64  SpO2:  [94 %-100 %] 96 %  Blood Pressure : 100/59   Temperature: 36.7 °C (98.1 °F)   Pulse: (!) 102   Respiration: (!) 22   Pulse Oximetry: 97 %       Physical  "Exam  Vitals and nursing note reviewed.   Constitutional:       Appearance: Normal appearance. He is ill-appearing.   HENT:      Head: Normocephalic and atraumatic.      Right Ear: External ear normal.      Left Ear: External ear normal.      Nose: Nose normal.      Mouth/Throat:      Mouth: Mucous membranes are moist.      Pharynx: Oropharynx is clear.   Eyes:      Extraocular Movements: Extraocular movements intact.      Pupils: Pupils are equal, round, and reactive to light.   Cardiovascular:      Rate and Rhythm: Regular rhythm. Tachycardia present.      Pulses: Normal pulses.      Heart sounds: Normal heart sounds.   Pulmonary:      Effort: Pulmonary effort is normal.      Breath sounds: Normal breath sounds.   Abdominal:      General: There is no distension.      Palpations: Abdomen is soft.      Tenderness: There is no abdominal tenderness. There is no guarding or rebound.   Musculoskeletal:         General: Normal range of motion.      Cervical back: Normal range of motion.   Skin:     General: Skin is warm.      Capillary Refill: Capillary refill takes less than 2 seconds.      Comments: Left Axilla Image as below   Neurological:      General: No focal deficit present.      Mental Status: He is alert and oriented to person, place, and time.   Psychiatric:         Mood and Affect: Mood normal.         Behavior: Behavior normal.         Thought Content: Thought content normal.         Judgment: Judgment normal.         Laboratory:  Recent Labs     03/06/24 2025   WBC 21.2*   RBC 5.05   HEMOGLOBIN 15.0   HEMATOCRIT 45.0   MCV 89.1   MCH 29.7   MCHC 33.3   RDW 47.2   PLATELETCT 121*   MPV 12.3     Recent Labs     03/06/24 2025   SODIUM 139   POTASSIUM 5.4   CHLORIDE 102   CO2 19*   GLUCOSE 119*   BUN 44*   CREATININE 2.60*   CALCIUM 9.7     Recent Labs     03/06/24 2025   ALTSGPT 24   ASTSGOT 19   ALKPHOSPHAT 86   TBILIRUBIN 1.4   GLUCOSE 119*         No results for input(s): \"NTPROBNP\" in the last 72 " "hours.      No results for input(s): \"TROPONINT\" in the last 72 hours.    Imaging:  CT-CHEST (THORAX) W/O   Final Result      1.  Partially visualized large left axillary mass and necrotic adenopathy is consistent with known squamous cell carcinoma. Central hypodensity in the larger mass may be related to necrosis.      2.  0.9 cm pleural-based nodule in the right lower lobe is nonspecific, possibly present on the prior exam where there were patchy opacities. Follow-up per oncology protocol      3.  Patchy groundglass density and subsegmental atelectasis appears slightly improved from the prior CT. Findings are consistent with a chronic inflammatory/infectious process.      Fleischner Society pulmonary nodule recommendations:   Not Applicable         IR-CONSULT AND TREAT    (Results Pending)       EKG:  I have personally reviewed the images and compared with prior images.    Assessment/Plan:  Problem Representation:   I anticipate this patient will require at least two midnights for appropriate medical management, necessitating inpatient admission because of management of severe sepsis secondary to cellulitis    Patient will need a Telemetry bed on EMERGENCY service .  The need is secondary to management of severe sepsis secondary to cellulitis.    * Sepsis (HCC)- (present on admission)  Assessment & Plan  This is Sepsis Present on admission  SIRS criteria identified on my evaluation include: Tachycardia, with heart rate greater than 90 BPM, Tachypnea, with respirations greater than 20 per minute, Leukocytosis, with WBC greater than 12,000, and Bandemia, greater than 10% bands  Clinical indicators of end organ dysfunction include Hypotension with systolic blood pressure less than 90 or MAP less than 65, Lactic Acid greater than 2, and Acute On Chronic Renal Failure, with creatinine >0.5 above baseline level  Source is skin and soft tissue of the left axilla  Sepsis protocol initiated  Crystalloid Fluid " Administration: Fluid resuscitation ordered per standard protocol - 30 mL/kg per current or ideal body weight  IV antibiotics as appropriate for source of sepsis  Reassessment: I have reassessed the patient's hemodynamic status  Complicated by history of renal transplant on 3 immunosuppressive therapy and active necrotic SCC    -Sepsis protocols initiated   -Telemetry monitoring  -Broad spectrum abx:  Cefepime 2g every 12 hours and Linezolid 600 mg IV BID  -Follow blood cultures   -Maintenance IVF with  mL bolus if MAP <65 or SBP <90  -Monitor UOP, goal of 0.5 ml/kg/hr  -Trend lactate  -MAP goals > 65, may need vasopressors  -General surgery consulted in the ED, recommend IR drainage, and medical oncology consult    Squamous cell carcinoma of skin  Assessment & Plan  Of left axilla per biopsy on 2/23/24, now with cellulitis, abscess, necrosis. Not established with oncology    -Medical oncology consult in the AM    High anion gap metabolic acidosis  Assessment & Plan  CO2 19, AG 18, LA 3.5, secondary to sepsis    -Management as above for sepsis    Acute hypoxic respiratory failure (HCC)- (present on admission)  Assessment & Plan  No baseline O2, now requiring 4L  CT chest without showing partially visualized large left axillary mass and necrotic adenopathy consistent with known SCC.  Patchy groundglass density and subsegmental atelectasis, consistent with chronic inflammatory/infectious process  Secondary to sepsis vs chronic inflammatory/infectious process    -Supplemental o2 for O2 saturation >92%  -Empiric treatment with Cefepime/Linezolid  -Management as above for sepsis    Acute kidney injury (HCC)- (present on admission)  Assessment & Plan  BUNs/CR 44/2.60 (baseline Cr ~2.0)  Possibly prerenal from sepsis    -Urinalysis  -Consider U/S renal to rule out post obstructive causes  -Maintain euvolemia and monitor fluid responsiveness  -MAP >65  -Avoid nephrotoxins and renally dose medications  -Monitor renal  function and urine output    History of renal transplant- (present on admission)  Assessment & Plan  2/2 polycystic kidney disease s/p right renal transplant in 2010    -Continue home mycophenolate 500 mg twice daily  -Continue home tacrolimus 1 mg twice daily  -Continue home prednisone 5 mg daily    Peripheral neuropathy  Assessment & Plan    -Continue home Gabapentin 300 mg BID    Abdominal aortic aneurysm (AAA) without rupture (HCC)- (present on admission)  Assessment & Plan  History of, 3.2 cm on 1/27/23    -Continue to monitor    AF (atrial fibrillation) (HCC)- (present on admission)  Assessment & Plan  History of, in SR, s/p watchman procedure on 1/2024, off anticoagulation    Type 2 diabetes mellitus (HCC)- (present on admission)  Assessment & Plan  A1C (2/16/24): 6.9%    -We will hold home medications  -SSI, hypoglycemia protocol, consistent carb diet    GERD (gastroesophageal reflux disease)- (present on admission)  Assessment & Plan  Controlled    -Continue Omeprazole 20 mg daily    Thrombocytopenia (HCC)- (present on admission)  Assessment & Plan  , likely secondary to sepsis    -Continue monitor    Coronary artery disease- (present on admission)  Assessment & Plan  S/p MI with 2 stents in 2011    -Hold aspirin 81 mg daily for possible surgical intervention  -Continue Atorvastatin 80 mg daily  -Metoprolol Succinate 25 mcg daily        VTE prophylaxis: SCDs/TEDs

## 2024-03-07 NOTE — WOUND TEAM
[facility]  Inpatient Services  Initial Wound and Skin Care Evaluation    Admission Date: 3/6/2024     Last order of IP CONSULT TO WOUND CARE was found on 3/7/2024 from Hospital Encounter on 3/6/2024     HPI, PMH, SH: Reviewed    Past Surgical History:   Procedure Laterality Date    STENT PLACEMENT  2013    cardiac    KNEE MANIPULATION  02/16/2012    Performed by LATOYA CONNER at SURGERY [facility] ORS    KNEE UNICOMPARTMENTAL  12/23/2011    Performed by LATOYA CONNER at SURGERY [facility] ORS    KNEE ARTHROSCOPY  12/23/2011    Performed by LATOYA CONNER at SURGERY [facility] ORS    KNEE ARTHROSCOPY  05/03/2011    Performed by HANANE GOLDMAN at SURGERY SAME DAY [facility] ORS    MENISCECTOMY, KNEE, MEDIAL  05/03/2011    Performed by HANANE GOLDMAN at SURGERY SAME DAY [facility] ORS    OTHER  09/10/2010    RIGHT KIDNEY TRANSPLANT    KNEE ARTHROPLASTY TOTAL  01/12/2007    RIGHT    KNEE ARTHROSCOPY  04/10/2006    RIGHT    OTHER ORTHOPEDIC SURGERY  07/08/1974    LEFT KNEE DEBRIDEMENT     Social History     Tobacco Use    Smoking status: Never     Passive exposure: Never    Smokeless tobacco: Never   Substance Use Topics    Alcohol use: No     Chief Complaint   Patient presents with    Flu Like Symptoms     C/o flu like symptoms since yesterday. + body aches and chills. + lightheadedness.     Wound Check     Also c/o left wound possible infection. Skin cancer ( left sided near axilla )      Diagnosis: Sepsis (HCC) [A41.9]    Unit where seen by Wound Team: [facility] 20/20 [facility]     WOUND CONSULT RELATED TO:  L axilla    WOUND TEAM PLAN OF CARE - Frequency of Follow-up:   Nursing to follow dressing orders written for wound care. Contact wound team if area fails to progress, deteriorates or with any questions/concerns if something comes up before next scheduled follow up (See below as to whether wound is following and frequency of wound follow up)   Weekly -      WOUND HISTORY:   Pt is a 69 yo male presenting  with a worsening wound at L axilla and flu like sumptoms.  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  Site failed to heal following biopsy.  Hx includes kidney transplant on steroids, DM, thrombocytopenia.  Pt was to follow up with oncology 3/8/24.  Pt states he has been using neomycin on site, the site has bled off and on, the satellite wounds are newer.      11/2023 US found fluid collection L axilla   2/2024  CT found complex mass L axilla    3/7/24 Dr Win general surgery consulted on pt and recommending, IR for possible aspiration/drain placement.      WOUND ASSESSMENT/LDA    Skin Risk/Javad   Skin Breakdown Risk: 18-23 Minimal Risk for Skin Breakdown                                                      Wound 03/06/24 Axilla Lateral Left (Active)   Wound Image   03/07/24 1100   Site Assessment Black;Dry 03/07/24 1100   Periwound Assessment Edema;Blanchable erythema;Purple, satellite wounds, nodules 03/07/24 1100   Drainage Amount Small 03/07/24 1100   Drainage Description Serous 03/07/24 1100   Dressing Status Other (Comment) 03/07/24 1100   Dressing Changed Changed 03/07/24 1100   Dressing Cleansing/Solutions 1/4 Strength Dakin's Solution 03/07/24 1100   Dressing Options Dry Gauze;Silicone Adhesive Foam 03/07/24 1100   Dressing Change/Treatment Frequency Every Shift, and As Needed 03/07/24 1100   NEXT Dressing Change/Treatment Date 03/07/24 03/07/24 1100   NEXT Weekly Photo (Inpatient Only) 03/14/24 03/07/24 1100   Wound Team Following Other (comment) 03/07/24 1100   Wound Length (cm) 1.5 cm 03/07/24 1100   Wound Width (cm) 3 cm 03/07/24 1100   Wound Surface Area (cm^2) 4.5 cm^2 03/07/24 1100   Wound Odor Foul 03/07/24 1100   WOUND NURSE ONLY - Time Spent with Patient (mins) 60 03/07/24 1100           Vascular:    MIRELLA:   No results found.    Lab Values:    Lab Results   Component Value Date/Time    WBC 21.2 (H) 03/06/2024 08:25 PM    RBC 5.05 03/06/2024 08:25 PM    HEMOGLOBIN 15.0  2024 08:25 PM    HEMATOCRIT 45.0 2024 08:25 PM    CREACTPROT 4.82 (H) 2022 09:42 AM    SEDRATEWES <1 2020 07:16 AM    HBA1C 6.9 (H) 2024 08:14 AM         Culture Results show:  Recent Results (from the past 720 hour(s))   CULTURE WOUND W/ GRAM STAIN    Collection Time: 24  9:40 AM    Specimen: Wound   Result Value Ref Range    Significant Indicator NEG     Source WND     Site LEFT AXILARRY LYMPH NODE     Culture Result No growth at 4 days.     Gram Stain Result Few WBCs.  No organisms seen.          Pain Level/Medicated:         INTERVENTIONS BY WOUND TEAM:  Performed standard wound care which includes appropriate positioning, dressing removal and non-selective debridement. Pictures and measurements obtained weekly if/when required.    Wound:  L axilla  Cleansed/Non-selectively Debrided with:  Wound cleanser and Gauze  Nadeen wound: Cleansed with Wound cleanser and Gauze, Prepped with No Sting  Primary Dressin/4 dakins soaked gauze,  Secondary (Outer) Dressing: adherent silicone faom    Advanced Wound Care Discharge Planning  Number of Clinicians necessary to complete wound care: 1  Is patient requiring IV pain medications for dressing changes:  No   Length of time for dressing change 60 min. (This does not include chart review, pre-medication time, set up, clean up or time spent charting.)    Interdisciplinary consultation: Patient, Bedside RN (), .    EVALUATION / RATIONALE FOR TREATMENT:     Date:  24  Wound Status:  Initial evaluation    By pt report the L axillary wound has been worsening with swelling, drainage and pain.  The site of the biopsy is currently dry but pt states that at times it has bled.  The wound bed looks like a combination of dried blood and some eschar.  There are satellite wounds that are draining slighly with yellow and red tissue and may represent tissue breakdown related to increasing edema/fluid collection.  There are also some small nodule  appearing lesions in the tissue surrounding main wound.  Because of the cancer history the wound POC will be to minimize topical bacteria with dakins solution and advance plan as needed.           Goals: TBD    NURSING PLAN OF CARE ORDERS:  Dressing changes: See Dressing Care orders    NUTRITION RECOMMENDATIONS     DIET ORDERS (From admission to next 24h)       Start     Ordered    03/07/24 0141  Diet NPO Restrict to: Sips with Medications  ALL MEALS        Question:  Diet NPO Restrict to:  Answer:  Sips with Medications    03/07/24 0142                    PREVENTATIVE INTERVENTIONS:    Q shift Javad - performed per nursing policy  Q shift pressure point assessments - performed per nursing policy        Anticipated discharge plans:  TBD        Vac Discharge Needs:  Vac Discharge plan is purely a recommendation from wound team and not a requirement for discharge unless otherwise stated by physician.  Not Applicable Pt not on a wound vac

## 2024-03-07 NOTE — ASSESSMENT & PLAN NOTE
Sepsis present on admission  Shock developed after admission  Source is abscess of the left axilla  Continue broad-spectrum intravenous antimicrobial chemotherapy  Norepinephrine as necessary to keep mean arterial pressure greater than 65

## 2024-03-07 NOTE — CONSULTS
PULMONARY AND CRITICAL CARE MEDICINE CONSULTATION    Date of Consultation:  3/7/2024    Requesting Physician:  Pily Zhou MD    Consulting Physician:  Jama Barnes MD    Reason for Consultation: Septic shock    Chief Complaint: Septic shock    History of Present Illness:    I was kindly asked to see and evaluate Jama Altman, a 68 y.o. male for evaluation and management of the above problem.    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  He comes into the ED last evening with increased pain and swelling in the left axilla region.  This has been getting worse for the last couple of days prior to presentation.  He has not had any fever but he has had some chills.  He denies nausea, vomiting or abdominal pain.  He denies dysuria, urgency, frequency or hematuria.  He has no cough, sputum production, hemoptysis or dyspnea.  He has received over 3 L of intravenous crystalloid solution and has had some intermittent hypotension.    Medications Prior to Admission:    No current facility-administered medications on file prior to encounter.     Current Outpatient Medications on File Prior to Encounter   Medication Sig Dispense Refill    metoprolol SR (TOPROL XL) 25 MG TABLET SR 24 HR Take 1 Tablet by mouth every day. May take additional one tab daily for heart rate sustaining >110 BPM. 100 Tablet 3    aspirin 81 MG EC tablet Take 1 Tablet by mouth every day. 100 Tablet 1    pioglitazone (ACTOS) 45 MG Tab TAKE ONE TABLET BY MOUTH ONCE DAILY 100 Tablet 3    gabapentin (NEURONTIN) 300 MG Cap TAKE 2 CAPSULES BY MOUTH TWICE DAILY 360 Capsule 1    linagliptin (TRADJENTA) 5 MG Tab tablet Take 1 Tablet by mouth every day. 100 Tablet 3     omeprazole (PRILOSEC) 20 MG delayed-release capsule Take 1 Capsule by mouth every day. 90 Capsule 3    mycophenolate (CELLCEPT) 500 MG tablet Take 500 mg by mouth 2 times a day.      glyBURIDE (DIABETA) 5 MG Tab Take 2 Tablets by mouth 2 times a day with meals. 400 Tablet 3    predniSONE (DELTASONE) 5 MG Tab Take 1 Tablet by mouth every day. 30 Tablet 0    tacrolimus (PROGRAF) 1 MG Cap Take 1 Capsule by mouth 2 times a day. (Patient taking differently: Take 1 mg by mouth 2 times a day. 0800 2000) 60 Capsule 3    atorvastatin (LIPITOR) 80 MG tablet Take 1 Tablet by mouth at bedtime. 100 Tablet 3    tadalafil (CIALIS) 5 MG tablet : TAKE 1 TABLETS 30 MINUTES BEFORE SEXUAL ACTIVITY, DO NOT EXCEED MORE THAN ONE DOSE A DAY 15 Tablet 3       Current Medications:      Current Facility-Administered Medications:     atorvastatin (Lipitor) tablet 80 mg, 80 mg, Oral, QHS, MAGY Marie.O., 80 mg at 03/07/24 0326    gabapentin (Neurontin) capsule 600 mg, 600 mg, Oral, BID, MAGY Marie.O., 600 mg at 03/07/24 0532    [Held by provider] metoprolol SR (Toprol XL) tablet 25 mg, 25 mg, Oral, DAILY, NGOC MarieO.    [Held by provider] mycophenolate (Cellcept) capsule 500 mg, 500 mg, Oral, Q12HRS, MAGY Marie.O., 500 mg at 03/07/24 0612    omeprazole (PriLOSEC) capsule 20 mg, 20 mg, Oral, DAILY, NGOC MarieO., 20 mg at 03/07/24 0531    [Held by provider] tacrolimus (Prograf) capsule 1 mg, 1 mg, Oral, BID, NGOC MarieO., 1 mg at 03/07/24 0612    cefepime (Maxipime) 2 g in  mL IVPB, 2 g, Intravenous, Q24HRS, MAGY Marie.O., Stopped at 03/07/24 0615    Linezolid (Zyvox) premix 600 mg, 600 mg, Intravenous, Q12HRS, Abel English D.O., Stopped at 03/07/24 0725    lactated ringers infusion, , Intravenous, Continuous, Abel English D.O., Last Rate: 125 mL/hr at 03/07/24 1025, New Bag at 03/07/24 1025    acetaminophen (Tylenol) tablet 650 mg, 650 mg, Oral, Q6HRS PRN, Abel English D.O.    senna-docusate (Pericolace Or  Senokot S) 8.6-50 MG per tablet 2 Tablet, 2 Tablet, Oral, Q EVENING **AND** polyethylene glycol/lytes (Miralax) Packet 1 Packet, 1 Packet, Oral, QDAY PRN, Abel English D.O.    ondansetron (Zofran) syringe/vial injection 4 mg, 4 mg, Intravenous, Q4HRS PRN, Abel English D.O.    ondansetron (Zofran ODT) dispertab 4 mg, 4 mg, Oral, Q4HRS PRN, Abel English D.O.    dakins 0.125% (1/4 strength) topical soln, , Topical, BID, Pily Zhou M.D.    hydrocortisone sodium succinate PF (Solu-CORTEF) 100 MG injection 100 mg, 100 mg, Intravenous, Q8HRS, Jama Barnes M.D.    magnesium sulfate IVPB premix 4 g, 4 g, Intravenous, Once, Jama Barnes M.D.    insulin regular (HumuLIN R,NovoLIN R) injection, 2-9 Units, Subcutaneous, 4X/DAY ACHS **AND** POC blood glucose manual result, , , Q AC AND BEDTIME(S) **AND** NOTIFY MD and PharmD, , , Once **AND** Administer 20 grams of glucose (approximately 8 ounces of fruit juice) every 15 minutes PRN FSBG less than 70 mg/dL, , , PRN **AND** dextrose 50% (D50W) injection 25 g, 25 g, Intravenous, Q15 MIN PRN, Jama Barnes M.D.    norepinephrine (Levophed) 8 mg in 250 mL NS infusion (premix), 0-1 mcg/kg/min (Ideal), Intravenous, Continuous, Jama Barnes M.D.    Current Outpatient Medications:     metoprolol SR (TOPROL XL) 25 MG TABLET SR 24 HR, Take 1 Tablet by mouth every day. May take additional one tab daily for heart rate sustaining >110 BPM., Disp: 100 Tablet, Rfl: 3    aspirin 81 MG EC tablet, Take 1 Tablet by mouth every day., Disp: 100 Tablet, Rfl: 1    pioglitazone (ACTOS) 45 MG Tab, TAKE ONE TABLET BY MOUTH ONCE DAILY, Disp: 100 Tablet, Rfl: 3    gabapentin (NEURONTIN) 300 MG Cap, TAKE 2 CAPSULES BY MOUTH TWICE DAILY, Disp: 360 Capsule, Rfl: 1    linagliptin (TRADJENTA) 5 MG Tab tablet, Take 1 Tablet by mouth every day., Disp: 100 Tablet, Rfl: 3    omeprazole (PRILOSEC) 20 MG delayed-release capsule, Take 1 Capsule by mouth every day., Disp:  90 Capsule, Rfl: 3    mycophenolate (CELLCEPT) 500 MG tablet, Take 500 mg by mouth 2 times a day., Disp: , Rfl:     glyBURIDE (DIABETA) 5 MG Tab, Take 2 Tablets by mouth 2 times a day with meals., Disp: 400 Tablet, Rfl: 3    predniSONE (DELTASONE) 5 MG Tab, Take 1 Tablet by mouth every day., Disp: 30 Tablet, Rfl: 0    tacrolimus (PROGRAF) 1 MG Cap, Take 1 Capsule by mouth 2 times a day. (Patient taking differently: Take 1 mg by mouth 2 times a day. 0800 2000), Disp: 60 Capsule, Rfl: 3    atorvastatin (LIPITOR) 80 MG tablet, Take 1 Tablet by mouth at bedtime., Disp: 100 Tablet, Rfl: 3    tadalafil (CIALIS) 5 MG tablet, : TAKE 1 TABLETS 30 MINUTES BEFORE SEXUAL ACTIVITY, DO NOT EXCEED MORE THAN ONE DOSE A DAY, Disp: 15 Tablet, Rfl: 3    Allergies:    Doxycycline    Past Surgical History:    Past Surgical History:   Procedure Laterality Date    STENT PLACEMENT  2013    cardiac    KNEE MANIPULATION  02/16/2012    Performed by LATOYA CONNER at SURGERY [facility] ORS    KNEE UNICOMPARTMENTAL  12/23/2011    Performed by LATOYA CONNER at SURGERY [facility] ORS    KNEE ARTHROSCOPY  12/23/2011    Performed by LATOYA CONNER at SURGERY [facility]    KNEE ARTHROSCOPY  05/03/2011    Performed by HANANE GOLDMAN at SURGERY SAME DAY [facility]    MENISCECTOMY, KNEE, MEDIAL  05/03/2011    Performed by HANANE GOLDMAN at SURGERY SAME DAY [facility]    OTHER  09/10/2010    RIGHT KIDNEY TRANSPLANT    KNEE ARTHROPLASTY TOTAL  01/12/2007    RIGHT    KNEE ARTHROSCOPY  04/10/2006    RIGHT    OTHER ORTHOPEDIC SURGERY  07/08/1974    LEFT KNEE DEBRIDEMENT       Past Medical History:    Past Medical History:   Diagnosis Date    A-fib (HCC)     Arrhythmia     Benign essential hypertension     Diabetes (HCC)     type 2    Heart burn     Hemorrhagic disorder (HCC)     Eliquis    Hyperlipoproteinemia     Hypertension     not on meds anymore    Indigestion     Myocardial infarct (HCC) 2013    stent    Polycystic kidney 09/10/2010     "RIGHT KIDNEY TRANSPLANT    Presence of Watchman left atrial appendage closure device 02/29/2024    Sleep apnea 01/30/2024    states he was told he had sleep apnea but has not had a sleep study    Snoring        Social History:    Social History     Socioeconomic History    Marital status: Single     Spouse name: Not on file    Number of children: Not on file    Years of education: Not on file    Highest education level: Not on file   Occupational History    Not on file   Tobacco Use    Smoking status: Never     Passive exposure: Never    Smokeless tobacco: Never   Vaping Use    Vaping Use: Never used   Substance and Sexual Activity    Alcohol use: No    Drug use: No    Sexual activity: Yes     Partners: Female   Other Topics Concern    Not on file   Social History Narrative    Not on file     Social Determinants of Health     Financial Resource Strain: Not on file   Food Insecurity: Not on file   Transportation Needs: Not on file   Physical Activity: Not on file   Stress: Not on file   Social Connections: Not on file   Intimate Partner Violence: Not on file   Housing Stability: Not on file       Family History:    History reviewed. No pertinent family history.    Review of System:    Review of Systems   Unable to perform ROS: Acuity of condition       Physical Examination:    BP 97/58   Pulse 92   Temp 36.5 °C (97.7 °F) (Temporal)   Resp 14   Ht 1.956 m (6' 5\")   Wt 107 kg (235 lb)   SpO2 94%   BMI 27.87 kg/m²   Physical Exam  Constitutional:       Appearance: He is not diaphoretic.   HENT:      Head: Normocephalic.      Mouth/Throat:      Pharynx: Oropharynx is clear.   Eyes:      Pupils: Pupils are equal, round, and reactive to light.   Cardiovascular:      Comments: Sinus rhythm  Pulmonary:      Breath sounds: No wheezing or rales.   Abdominal:      General: There is no distension.      Tenderness: There is no abdominal tenderness.   Musculoskeletal:      Right lower leg: No edema.      Left lower leg: No " edema.   Skin:     Capillary Refill: Capillary refill takes less than 2 seconds.      Comments: He has significant swelling with erythema and some tenderness in the left axilla with an open wound.  The interested reader is referred to the photos.   Neurological:      General: No focal deficit present.      Mental Status: He is oriented to person, place, and time.      Cranial Nerves: No cranial nerve deficit.         Laboratory Data:        Recent Labs     03/06/24 2025   WBC 21.2*   RBC 5.05   HEMOGLOBIN 15.0   HEMATOCRIT 45.0   MCV 89.1   MCH 29.7   MCHC 33.3   RDW 47.2   PLATELETCT 121*   MPV 12.3     Recent Labs     03/06/24 2025   SODIUM 139   POTASSIUM 5.4   CHLORIDE 102   CO2 19*   GLUCOSE 119*   BUN 44*   CREATININE 2.60*   CALCIUM 9.7                   Imaging:    I personally viewed all the available CXR and CT scan images as well as reviewed the radiology interpretation reports.    US-RENAL   Final Result      1.  Normal appearance of the right lower quadrant transplant kidney.   2.  Native left kidney demonstrates no hydronephrosis. There are innumerable cysts with very little normal-appearing parenchyma.   3.  Rounded echogenic masslike area in the inferior bladder. It is uncertain if this is related to an enlarged prostate gland protruding into the bladder base or a bladder mass. Recommend clinical correlation with history of hematuria.      CT-CHEST (THORAX) W/O   Final Result      1.  Partially visualized large left axillary mass and necrotic adenopathy is consistent with known squamous cell carcinoma. Central hypodensity in the larger mass may be related to necrosis.      2.  0.9 cm pleural-based nodule in the right lower lobe is nonspecific, possibly present on the prior exam where there were patchy opacities. Follow-up per oncology protocol      3.  Patchy groundglass density and subsegmental atelectasis appears slightly improved from the prior CT. Findings are consistent with a chronic  inflammatory/infectious process.      Fleischner Society pulmonary nodule recommendations:   Not Applicable         US-EXTREMITY VENOUS UPPER UNILAT LEFT    (Results Pending)   CT-DRAIN-SKIN - SUBCUTANEOUS    (Results Pending)       Assessment and Plan:    * Septic shock (HCC)- (present on admission)  Assessment & Plan  Sepsis present on admission  Shock developed after admission  Source is abscess of the left axilla  Continue broad-spectrum intravenous antimicrobial chemotherapy  Norepinephrine as necessary to keep mean arterial pressure greater than 65    Abscess of axilla, left  Assessment & Plan  Imaging with large left axillary mass, necrotic adenopathy and central hypodensity consistent with necrosis/abscess  S/P IR drain placement on 3/7  Continue broad-spectrum antimicrobial chemotherapy with cefepime and linezolid pending culture results    Squamous cell carcinoma of skin  Assessment & Plan  Diagnosed with squamous cell carcinoma in the left axilla on 2/23/2024    Acute hypoxic respiratory failure (HCC)- (present on admission)  Assessment & Plan  Continue oxygen    AF (atrial fibrillation) (HCC)- (present on admission)  Assessment & Plan  History of  Currently in sinus rhythm  Optimize potassium and magnesium    Type 2 diabetes mellitus (HCC)- (present on admission)  Assessment & Plan  Glycohemoglobin 6.9  Sliding scale insulin    Acute kidney injury superimposed on chronic kidney disease (HCC)- (present on admission)  Assessment & Plan  Monitor renal function and urine output  Avoid nephrotoxins and renal dose medications  IV fluid resuscitation    Coronary artery disease- (present on admission)  Assessment & Plan  Continue atorvastatin  Hold metoprolol succinate given septic shock    Primary hypertension- (present on admission)  Assessment & Plan  Currently hypotensive  Hold metoprolol succinate    History of renal transplant- (present on admission)  Assessment & Plan  History of renal transplant in 2010  for polycystic kidney disease  Hold mycophenolate and tacrolimus for now due to presence of sepsis with shock  Stop prednisone begin hydrocortisone, 100 mg IV every 8 hours    Presence of Watchman left atrial appendage closure device- (present on admission)  Assessment & Plan  Watchman device placed on 1/9/2024    GERD (gastroesophageal reflux disease)- (present on admission)  Assessment & Plan  Continue omeprazole    Hyperlipidemia- (present on admission)  Assessment & Plan  Continue atorvastatin        High risk of deterioration and worsening vital organ dysfunction and death without the above critical care interventions.    I have assessed and reassessed his respiratory status, blood pressure, hemodynamics and cardiovascular status.  This gentleman is critically ill with a left axillary abscess.  His blood pressure is low despite IV fluid resuscitation.  He is at increased risk for worsening respiratory and cardiovascular system dysfunction.    Thank you for allowing me to participate in the care of this gentleman.  At this time, he may be safely admitted to the [facility].    The patient is critically ill.  Critical care time = 35 minutes in directly providing and coordinating critical care and extensive data review.  No time overlap and excludes procedures.    Discussed with hospitalist, RN    Jama Barnes MD  Pulmonary and Critical Care Medicine

## 2024-03-07 NOTE — ED NOTES
IVF boluses complete.  BP 77/57.  US bedside at this time, reports bladder with 315 ml after patient attempted to void but was unable to.  Pt reports last urine output was last night.  Pt's wife concerned about pt retaining fluid and showing RN area of swelling and bruising to L posterior axilla/flank that she states is worsening.  Admitting MD Zhou aware and states he will come re-eval soon.

## 2024-03-08 ENCOUNTER — APPOINTMENT (OUTPATIENT)
Dept: RADIOLOGY | Facility: MEDICAL CENTER | Age: 69
DRG: 871 | End: 2024-03-08
Attending: INTERNAL MEDICINE
Payer: MEDICARE

## 2024-03-08 PROBLEM — M79.89 LEFT ARM SWELLING: Status: ACTIVE | Noted: 2024-03-08

## 2024-03-08 LAB
ALBUMIN SERPL BCP-MCNC: 2.8 G/DL (ref 3.2–4.9)
ALBUMIN/GLOB SERPL: 1.2 G/DL
ALP SERPL-CCNC: 61 U/L (ref 30–99)
ALT SERPL-CCNC: 19 U/L (ref 2–50)
ANION GAP SERPL CALC-SCNC: 11 MMOL/L (ref 7–16)
AST SERPL-CCNC: 27 U/L (ref 12–45)
BASOPHILS # BLD AUTO: 0.2 % (ref 0–1.8)
BASOPHILS # BLD: 0.03 K/UL (ref 0–0.12)
BILIRUB SERPL-MCNC: 0.7 MG/DL (ref 0.1–1.5)
BUN SERPL-MCNC: 54 MG/DL (ref 8–22)
CALCIUM ALBUM COR SERPL-MCNC: 8.9 MG/DL (ref 8.5–10.5)
CALCIUM SERPL-MCNC: 7.9 MG/DL (ref 8.5–10.5)
CHLORIDE SERPL-SCNC: 103 MMOL/L (ref 96–112)
CO2 SERPL-SCNC: 19 MMOL/L (ref 20–33)
CREAT SERPL-MCNC: 2.67 MG/DL (ref 0.5–1.4)
CRP SERPL HS-MCNC: 31.27 MG/DL (ref 0–0.75)
EOSINOPHIL # BLD AUTO: 0.01 K/UL (ref 0–0.51)
EOSINOPHIL NFR BLD: 0.1 % (ref 0–6.9)
ERYTHROCYTE [DISTWIDTH] IN BLOOD BY AUTOMATED COUNT: 47.5 FL (ref 35.9–50)
GFR SERPLBLD CREATININE-BSD FMLA CKD-EPI: 25 ML/MIN/1.73 M 2
GLOBULIN SER CALC-MCNC: 2.3 G/DL (ref 1.9–3.5)
GLUCOSE BLD STRIP.AUTO-MCNC: 330 MG/DL (ref 65–99)
GLUCOSE BLD STRIP.AUTO-MCNC: 358 MG/DL (ref 65–99)
GLUCOSE SERPL-MCNC: 359 MG/DL (ref 65–99)
HCT VFR BLD AUTO: 38 % (ref 42–52)
HGB BLD-MCNC: 12.1 G/DL (ref 14–18)
IMM GRANULOCYTES # BLD AUTO: 0.11 K/UL (ref 0–0.11)
IMM GRANULOCYTES NFR BLD AUTO: 0.8 % (ref 0–0.9)
LYMPHOCYTES # BLD AUTO: 0.22 K/UL (ref 1–4.8)
LYMPHOCYTES NFR BLD: 1.6 % (ref 22–41)
MCH RBC QN AUTO: 28.7 PG (ref 27–33)
MCHC RBC AUTO-ENTMCNC: 31.8 G/DL (ref 32.3–36.5)
MCV RBC AUTO: 90 FL (ref 81.4–97.8)
MONOCYTES # BLD AUTO: 0.69 K/UL (ref 0–0.85)
MONOCYTES NFR BLD AUTO: 5 % (ref 0–13.4)
NEUTROPHILS # BLD AUTO: 12.85 K/UL (ref 1.82–7.42)
NEUTROPHILS NFR BLD: 92.3 % (ref 44–72)
NRBC # BLD AUTO: 0 K/UL
NRBC BLD-RTO: 0 /100 WBC (ref 0–0.2)
PLATELET # BLD AUTO: 72 K/UL (ref 164–446)
PLATELETS.RETICULATED NFR BLD AUTO: 6 % (ref 0.6–13.1)
PMV BLD AUTO: 12.5 FL (ref 9–12.9)
POTASSIUM SERPL-SCNC: 5.4 MMOL/L (ref 3.6–5.5)
PROCALCITONIN SERPL-MCNC: 30.1 NG/ML
PROT SERPL-MCNC: 5.1 G/DL (ref 6–8.2)
RBC # BLD AUTO: 4.22 M/UL (ref 4.7–6.1)
SODIUM SERPL-SCNC: 133 MMOL/L (ref 135–145)
WBC # BLD AUTO: 13.9 K/UL (ref 4.8–10.8)

## 2024-03-08 PROCEDURE — 85055 RETICULATED PLATELET ASSAY: CPT

## 2024-03-08 PROCEDURE — 84145 PROCALCITONIN (PCT): CPT

## 2024-03-08 PROCEDURE — A9270 NON-COVERED ITEM OR SERVICE: HCPCS | Performed by: HOSPITALIST

## 2024-03-08 PROCEDURE — 99231 SBSQ HOSP IP/OBS SF/LOW 25: CPT | Performed by: SURGERY

## 2024-03-08 PROCEDURE — A9270 NON-COVERED ITEM OR SERVICE: HCPCS | Performed by: STUDENT IN AN ORGANIZED HEALTH CARE EDUCATION/TRAINING PROGRAM

## 2024-03-08 PROCEDURE — 80053 COMPREHEN METABOLIC PANEL: CPT

## 2024-03-08 PROCEDURE — 80180 DRUG SCRN QUAN MYCOPHENOLATE: CPT

## 2024-03-08 PROCEDURE — 700105 HCHG RX REV CODE 258: Performed by: STUDENT IN AN ORGANIZED HEALTH CARE EDUCATION/TRAINING PROGRAM

## 2024-03-08 PROCEDURE — 85025 COMPLETE CBC W/AUTO DIFF WBC: CPT

## 2024-03-08 PROCEDURE — 99233 SBSQ HOSP IP/OBS HIGH 50: CPT | Performed by: HOSPITALIST

## 2024-03-08 PROCEDURE — 700101 HCHG RX REV CODE 250: Performed by: NURSE PRACTITIONER

## 2024-03-08 PROCEDURE — 80197 ASSAY OF TACROLIMUS: CPT

## 2024-03-08 PROCEDURE — 82962 GLUCOSE BLOOD TEST: CPT

## 2024-03-08 PROCEDURE — 700102 HCHG RX REV CODE 250 W/ 637 OVERRIDE(OP): Performed by: HOSPITALIST

## 2024-03-08 PROCEDURE — 86140 C-REACTIVE PROTEIN: CPT

## 2024-03-08 PROCEDURE — 770000 HCHG ROOM/CARE - INTERMEDIATE ICU *

## 2024-03-08 PROCEDURE — 700105 HCHG RX REV CODE 258: Performed by: HOSPITALIST

## 2024-03-08 PROCEDURE — 700102 HCHG RX REV CODE 250 W/ 637 OVERRIDE(OP): Performed by: STUDENT IN AN ORGANIZED HEALTH CARE EDUCATION/TRAINING PROGRAM

## 2024-03-08 PROCEDURE — 93971 EXTREMITY STUDY: CPT | Mod: LT

## 2024-03-08 PROCEDURE — 700111 HCHG RX REV CODE 636 W/ 250 OVERRIDE (IP): Performed by: STUDENT IN AN ORGANIZED HEALTH CARE EDUCATION/TRAINING PROGRAM

## 2024-03-08 PROCEDURE — 700111 HCHG RX REV CODE 636 W/ 250 OVERRIDE (IP): Mod: JZ | Performed by: INTERNAL MEDICINE

## 2024-03-08 RX ORDER — SODIUM CHLORIDE 0.9 % (FLUSH) 0.9 %
10 SYRINGE (ML) INJECTION 2 TIMES DAILY
Status: DISCONTINUED | OUTPATIENT
Start: 2024-03-08 | End: 2024-03-16 | Stop reason: HOSPADM

## 2024-03-08 RX ORDER — LINEZOLID 600 MG/1
600 TABLET, FILM COATED ORAL EVERY 12 HOURS
Status: DISCONTINUED | OUTPATIENT
Start: 2024-03-08 | End: 2024-03-15

## 2024-03-08 RX ORDER — OXYCODONE HYDROCHLORIDE 5 MG/1
5 TABLET ORAL EVERY 4 HOURS PRN
Status: DISCONTINUED | OUTPATIENT
Start: 2024-03-08 | End: 2024-03-08

## 2024-03-08 RX ORDER — DEXTROSE MONOHYDRATE 25 G/50ML
25 INJECTION, SOLUTION INTRAVENOUS
Status: DISCONTINUED | OUTPATIENT
Start: 2024-03-08 | End: 2024-03-13 | Stop reason: ALTCHOICE

## 2024-03-08 RX ORDER — OXYCODONE HYDROCHLORIDE 5 MG/1
5 TABLET ORAL ONCE
Status: COMPLETED | OUTPATIENT
Start: 2024-03-08 | End: 2024-03-08

## 2024-03-08 RX ADMIN — HYDROCORTISONE SODIUM SUCCINATE 100 MG: 100 INJECTION, POWDER, FOR SOLUTION INTRAMUSCULAR; INTRAVENOUS at 14:17

## 2024-03-08 RX ADMIN — CEFEPIME 2 G: 2 INJECTION, POWDER, FOR SOLUTION INTRAVENOUS at 05:51

## 2024-03-08 RX ADMIN — ACETAMINOPHEN 650 MG: 325 TABLET, FILM COATED ORAL at 17:41

## 2024-03-08 RX ADMIN — INSULIN HUMAN 12 UNITS: 100 INJECTION, SOLUTION PARENTERAL at 14:43

## 2024-03-08 RX ADMIN — ATORVASTATIN CALCIUM 80 MG: 80 TABLET, FILM COATED ORAL at 21:06

## 2024-03-08 RX ADMIN — LINEZOLID 600 MG: 600 TABLET, FILM COATED ORAL at 17:41

## 2024-03-08 RX ADMIN — SODIUM CHLORIDE, PRESERVATIVE FREE 10 ML: 5 INJECTION INTRAVENOUS at 08:49

## 2024-03-08 RX ADMIN — HYDROCORTISONE SODIUM SUCCINATE 100 MG: 100 INJECTION, POWDER, FOR SOLUTION INTRAMUSCULAR; INTRAVENOUS at 21:31

## 2024-03-08 RX ADMIN — ACETAMINOPHEN 650 MG: 325 TABLET, FILM COATED ORAL at 08:46

## 2024-03-08 RX ADMIN — INSULIN HUMAN 10 UNITS: 100 INJECTION, SOLUTION PARENTERAL at 21:28

## 2024-03-08 RX ADMIN — SODIUM HYPOCHLORITE 473 ML: 1.25 SOLUTION TOPICAL at 21:05

## 2024-03-08 RX ADMIN — SODIUM CHLORIDE, POTASSIUM CHLORIDE, SODIUM LACTATE AND CALCIUM CHLORIDE: 600; 310; 30; 20 INJECTION, SOLUTION INTRAVENOUS at 02:32

## 2024-03-08 RX ADMIN — SODIUM CHLORIDE, POTASSIUM CHLORIDE, SODIUM LACTATE AND CALCIUM CHLORIDE: 600; 310; 30; 20 INJECTION, SOLUTION INTRAVENOUS at 15:08

## 2024-03-08 RX ADMIN — LINEZOLID 600 MG: 600 INJECTION, SOLUTION INTRAVENOUS at 05:55

## 2024-03-08 RX ADMIN — OXYCODONE 5 MG: 5 TABLET ORAL at 21:33

## 2024-03-08 RX ADMIN — SODIUM CHLORIDE, PRESERVATIVE FREE 10 ML: 5 INJECTION INTRAVENOUS at 19:20

## 2024-03-08 RX ADMIN — GABAPENTIN 600 MG: 300 CAPSULE ORAL at 17:41

## 2024-03-08 RX ADMIN — GABAPENTIN 600 MG: 300 CAPSULE ORAL at 05:51

## 2024-03-08 RX ADMIN — SODIUM HYPOCHLORITE 473 ML: 1.25 SOLUTION TOPICAL at 05:51

## 2024-03-08 RX ADMIN — HYDROCORTISONE SODIUM SUCCINATE 100 MG: 100 INJECTION, POWDER, FOR SOLUTION INTRAMUSCULAR; INTRAVENOUS at 05:51

## 2024-03-08 RX ADMIN — OMEPRAZOLE 20 MG: 20 CAPSULE, DELAYED RELEASE ORAL at 05:51

## 2024-03-08 ASSESSMENT — ENCOUNTER SYMPTOMS
SPEECH CHANGE: 0
MYALGIAS: 0
CHILLS: 0
PALPITATIONS: 0
NAUSEA: 0
VOMITING: 0
WEAKNESS: 0
NERVOUS/ANXIOUS: 0
DIARRHEA: 0
HEADACHES: 0
FEVER: 0
TREMORS: 0
SHORTNESS OF BREATH: 0
ABDOMINAL PAIN: 0
NECK PAIN: 0

## 2024-03-08 ASSESSMENT — PAIN DESCRIPTION - PAIN TYPE
TYPE: ACUTE PAIN

## 2024-03-08 ASSESSMENT — FIBROSIS 4 INDEX: FIB4 SCORE: 2.15

## 2024-03-08 NOTE — WOUND TEAM
Wound consult for L axilla, pt seen 3/7/24 in the ED and dressings orders for dakins to the open wounds in place.  Please continue dakins to open wounds and follow IR instructions for drain.  Wound team signing off.  Please re consult as needs arise.

## 2024-03-08 NOTE — PROGRESS NOTES
4 Eyes Skin Assessment Completed by LAN Johnson and LAN Jay.    Head WDL  Ears WDL  Nose WDL  Mouth WDL  Neck WDL  Breast/Chest Redness, Scab, and Edema, NICKI drain intact   Shoulder Blades WDL  Spine WDL  (R) Arm/Elbow/Hand WDL  (L) Arm/Elbow/Hand WDL  Abdomen WDL  Groin WDL  Scrotum/Coccyx/Buttocks WDL  (R) Leg WDL  (L) Leg WDL  (R) Heel/Foot/Toe WDL  (L) Heel/Foot/Toe WDL          Devices In Places ECG, Blood Pressure Cuff, and Pulse Ox  NICKI drain     Interventions In Place Pillows, Low Air Loss Mattress, Heels Loaded W/Pillows, and Pressure Redistribution Mattress    Possible Skin Injury Yes    Pictures Uploaded Into Epic Yes  Wound Consult Placed Yes  RN Wound Prevention Protocol Ordered No

## 2024-03-08 NOTE — PROGRESS NOTES
4 Eyes Skin Assessment Completed by LAN Desai and LAN Washington.    Head WDL  Ears WDL  Nose WDL  Mouth WDL  Neck WDL  Breast/Chest Redness, Scab, Edema, and Incision NICKI drain to L chest/ axilla. Bruise to right upper chest  Shoulder Blades WDL  Spine WDL  (R) Arm/Elbow/Hand WDL  (L) Arm/Elbow/Hand WDL  Abdomen WDL  Groin WDL  Scrotum/Coccyx/Buttocks WDL  (R) Leg WDL  (L) Leg Bruising  (R) Heel/Foot/Toe WDL  (L) Heel/Foot/Toe WDL          Devices In Places ECG, Blood Pressure Cuff, Pulse Ox, and HFNC, NICKI drain      Interventions In Place Gray Ear Foams, Pillows, and Low Air Loss Mattress    Possible Skin Injury Yes    Pictures Uploaded Into Epic Yes  Wound Consult Placed Yes  RN Wound Prevention Protocol Ordered No

## 2024-03-08 NOTE — PROGRESS NOTES
Radiology Progress Note   Author: SHARON Live Date & Time created: 3/8/2024  8:33 AM   Date of admission  3/6/2024  Note to reader: this note follows the APSO format rather than the historical SOAP format. Assessment and plan located at the top of the note for ease of use.    Chief Complaint  68 y.o. male admitted 3/6/2024 with   Chief Complaint   Patient presents with    Flu Like Symptoms     C/o flu like symptoms since yesterday. + body aches and chills. + lightheadedness.     Wound Check     Also c/o left wound possible infection. 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  Patient underwent a left axillary abscess drain placement with IR Dr. Gutierrez on 03/07/2024.    Interval History:   03/08/24 - 12 Fr left axillary abscess drain to Left axilla with 455 mL turbid serosanguineous output in the last 24 hours. WBC 13.9. Cultures pending. On ABX. Drain flushed with 10 mL NS.      Assessment/Plan     Principal Problem:    Septic shock (HCC)  Active Problems:    History of renal transplant    Primary hypertension    Hyperlipidemia    Coronary artery disease    Acute kidney injury superimposed on chronic kidney disease (HCC)    Thrombocytopenia (HCC)    GERD (gastroesophageal reflux disease)    Type 2 diabetes mellitus (HCC)    AF (atrial fibrillation) (HCC)    Acute hypoxic respiratory failure (HCC)    Abdominal aortic aneurysm (AAA) without rupture (HCC)    Presence of Watchman left atrial appendage closure device    Peripheral neuropathy    Squamous cell carcinoma of skin    Abscess of axilla, left    Severe  sepsis (HCC)      Plan IR  - Order placed to irrigate Left axillary drain with 10 ml of sterile saline each shift  - Fluid cultures pending   - general surgery following   - Recommend follow up CT scan in 5-7 days (03/12/24)  - Continue to monitor drains, VS, and labs    -  -Thank you for allowing Interventional Radiology team to participate in the patients care, if any additional care or requests are needed in the future please do not hesitate to call or place IR order   [phone]           Review of Systems  Physical Exam   Review of Systems   Constitutional:  Negative for chills and fever.   Respiratory:  Negative for shortness of breath.    Cardiovascular:  Negative for chest pain and palpitations.   Gastrointestinal:  Negative for nausea and vomiting.   Musculoskeletal:         Left axillary pain   Neurological:  Negative for weakness and headaches.      Vitals:    03/08/24 0800   BP: 104/70   Pulse: 75   Resp: 12   Temp: 35.9 °C (96.7 °F)   SpO2: 93%        Physical Exam  Vitals and nursing note reviewed.   Constitutional:       Appearance: Normal appearance.   Cardiovascular:      Rate and Rhythm: Normal rate.      Pulses: Normal pulses.   Pulmonary:      Effort: Pulmonary effort is normal. No respiratory distress.   Abdominal:      Palpations: Abdomen is soft.   Musculoskeletal:         General: Tenderness (Left axilla) present.      Comments: IR drain to left axilla   Skin:     General: Skin is warm and dry.   Neurological:      General: No focal deficit present.      Mental Status: He is alert and oriented to person, place, and time.   Psychiatric:         Mood and Affect: Mood normal.         Behavior: Behavior normal.             Labs    Recent Labs     03/06/24 2025 03/08/24  0230   WBC 21.2* 13.9*   RBC 5.05 4.22*   HEMOGLOBIN 15.0 12.1*   HEMATOCRIT 45.0 38.0*   MCV 89.1 90.0   MCH 29.7 28.7   MCHC 33.3 31.8*   RDW 47.2 47.5   PLATELETCT 121* 72*   MPV 12.3 12.5     Recent Labs     03/06/24 2025  03/08/24  0230   SODIUM 139 133*   POTASSIUM 5.4 5.4   CHLORIDE 102 103   CO2 19* 19*   GLUCOSE 119* 359*   BUN 44* 54*   CREATININE 2.60* 2.67*   CALCIUM 9.7 7.9*     Recent Labs     03/06/24 2025 03/08/24  0230   ALBUMIN 4.4 2.8*   TBILIRUBIN 1.4 0.7   ALKPHOSPHAT 86 61   TOTPROTEIN 7.2 5.1*   ALTSGPT 24 19   ASTSGOT 19 27   CREATININE 2.60* 2.67*     US-RENAL   Final Result      1.  Normal appearance of the right lower quadrant transplant kidney.   2.  Native left kidney demonstrates no hydronephrosis. There are innumerable cysts with very little normal-appearing parenchyma.   3.  Rounded echogenic masslike area in the inferior bladder. It is uncertain if this is related to an enlarged prostate gland protruding into the bladder base or a bladder mass. Recommend clinical correlation with history of hematuria.      CT-CHEST (THORAX) W/O   Final Result      1.  Partially visualized large left axillary mass and necrotic adenopathy is consistent with known squamous cell carcinoma. Central hypodensity in the larger mass may be related to necrosis.      2.  0.9 cm pleural-based nodule in the right lower lobe is nonspecific, possibly present on the prior exam where there were patchy opacities. Follow-up per oncology protocol      3.  Patchy groundglass density and subsegmental atelectasis appears slightly improved from the prior CT. Findings are consistent with a chronic inflammatory/infectious process.      Fleischner Society pulmonary nodule recommendations:   Not Applicable         US-EXTREMITY VENOUS UPPER UNILAT LEFT    (Results Pending)   CT-DRAIN-SKIN - SUBCUTANEOUS    (Results Pending)     INR   Date Value Ref Range Status   03/06/2024 1.34 (H) 0.87 - 1.13 Final     Comment:     INR - Non-therapeutic Reference Range: 0.87-1.13  INR - Therapeutic Reference Range: 2.0-4.0       POC INR   Date Value Ref Range Status   08/03/2022 2.1 (H) 0.9 - 1.2 Final     Comment:     INR - Non-therapeutic Reference Range:  0.9-1.2  INR - Therapeutic Reference Range: 2.0-4.0          Intake/Output Summary (Last 24 hours) at 3/8/2024 0833  Last data filed at 3/8/2024 0655  Gross per 24 hour   Intake 100 ml   Output 1025 ml   Net -925 ml      Labs not explicitly included in this progress note were reviewed by the author. Radiology/imaging not explicitly included in this progress note was reviewed by the author.     I have performed a physical exam and reviewed and updated ROS and Plan today (3/8/2024).     45 minutes in directly providing and coordinating care and extensive data review.  No time overlap and excludes procedures.

## 2024-03-08 NOTE — CARE PLAN
The patient is Stable - Low risk of patient condition declining or worsening    Shift Goals  Clinical Goals: hemodynamically stable  Patient Goals: antibiotics, sleep  Family Goals: updates, be present for rounds    Progress made toward(s) clinical / shift goals:  vital signs stable, patient and fiance updated on plan of care. Wound care done     Patient is not progressing towards the following goals:N/A

## 2024-03-08 NOTE — PROGRESS NOTES
Hospital Medicine Daily Progress Note    Date of Service  3/8/2024    Chief Complaint  Left arm swelling and discomfort.    Hospital Course  Jama Altman is a 68 y.o. male w/ a hx of afib recent watchman procedure,  CAD, renal transplant 2010 (hx of polycystic kidney disease), HLD, HTN and PAD.  He was admitted 3/6/2024 with weakness.  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  He had IR place a drain in left axilla 3/7.  A CT scan showed a large left axillary mass with necrosis.  He was hypotensive and received boluses and admitted with septic shock.    Interval Problem Update  Fiancee at bedside.  He feels much better this am.  WBC:13.9 <21.2. Plt:72 He is A+Ox3 and has clear speech.  I have consulted Dr Fajardo, oncology to evaluate him.  Bedside US tech states no Superficial nor deep vein thrombosis noted of the left arm.  I have called his kidney transplant team at [facility] [phone] and they are to call me back.    I have discussed this patient's plan of care and discharge plan at IDT rounds today with Case Management, Nursing, Nursing leadership, and other members of the IDT team.    Consultants/Specialty  oncology    Code Status  Full Code    Disposition  The patient is not medically cleared for discharge to home or a post-acute facility.      I have placed the appropriate orders for post-discharge needs.    Review of Systems  Review of Systems   Constitutional:  Negative for malaise/fatigue.   HENT:  Negative for congestion.    Respiratory:  Negative for shortness of breath.    Cardiovascular:  Negative for chest pain and leg swelling.   Gastrointestinal:  Negative for abdominal pain, diarrhea and nausea.   Genitourinary:  Negative for dysuria.   Musculoskeletal:  Negative for myalgias and neck pain.   Neurological:  Negative for tremors, speech change and headaches.   Psychiatric/Behavioral:  The patient is not nervous/anxious.          Physical Exam  Temp:  [35.7 °C (96.2 °F)-36.1 °C (97 °F)] 35.7 °C (96.3 °F)  Pulse:  [69-97] 86  Resp:  [10-30] 14  BP: ()/(54-77) 108/71  SpO2:  [90 %-96 %] 95 %    Physical Exam  Vitals reviewed.   Constitutional:       Appearance: Normal appearance. He is not diaphoretic.   HENT:      Head: Normocephalic and atraumatic.      Nose: Nose normal.      Mouth/Throat:      Mouth: Mucous membranes are moist.      Pharynx: No oropharyngeal exudate.   Eyes:      General: No scleral icterus.        Right eye: No discharge.         Left eye: No discharge.      Extraocular Movements: Extraocular movements intact.      Conjunctiva/sclera: Conjunctivae normal.   Cardiovascular:      Rate and Rhythm: Normal rate and regular rhythm.      Pulses:           Radial pulses are 2+ on the right side and 2+ on the left side.        Dorsalis pedis pulses are 2+ on the right side and 2+ on the left side.      Heart sounds: No murmur heard.  Pulmonary:      Effort: Pulmonary effort is normal. No respiratory distress.      Breath sounds: Normal breath sounds. No wheezing or rales.   Abdominal:      General: Bowel sounds are normal. There is no distension.      Palpations: Abdomen is soft.      Tenderness: There is no abdominal tenderness.   Musculoskeletal:         General: No swelling or tenderness.      Cervical back: Neck supple. No muscular tenderness.      Right lower leg: No edema.      Left lower leg: No edema.      Comments: Swelling of left arm.   Lymphadenopathy:      Cervical: No cervical adenopathy.   Skin:     Coloration: Skin is not jaundiced or pale.   Neurological:      General: No focal deficit present.      Mental Status: He is alert and oriented to person, place, and time. Mental status is at baseline.      Cranial Nerves: No cranial nerve deficit.   Psychiatric:         Mood and Affect: Mood normal.         Behavior: Behavior normal.         Fluids    Intake/Output Summary (Last 24 hours) at 3/8/2024  1736  Last data filed at 3/8/2024 1508  Gross per 24 hour   Intake 3421.73 ml   Output 540 ml   Net 2881.73 ml       Laboratory  Recent Labs     03/06/24 2025 03/08/24  0230   WBC 21.2* 13.9*   RBC 5.05 4.22*   HEMOGLOBIN 15.0 12.1*   HEMATOCRIT 45.0 38.0*   MCV 89.1 90.0   MCH 29.7 28.7   MCHC 33.3 31.8*   RDW 47.2 47.5   PLATELETCT 121* 72*   MPV 12.3 12.5     Recent Labs     03/06/24 2025 03/08/24  0230   SODIUM 139 133*   POTASSIUM 5.4 5.4   CHLORIDE 102 103   CO2 19* 19*   GLUCOSE 119* 359*   BUN 44* 54*   CREATININE 2.60* 2.67*   CALCIUM 9.7 7.9*     Recent Labs     03/06/24 2025   INR 1.34*               Imaging  US-EXTREMITY VENOUS UPPER UNILAT LEFT   Final Result      CT-DRAIN-SKIN - SUBCUTANEOUS   Final Result      1. Ultrasound GUIDED PLACEMENT OF A PERCUTANEOUS DRAINAGE CATHETER IN A LEFT AXILLARY FLUID COLLECTION.      2.  THE CURRENT PLAN IS TO MONITOR DRAINAGE OUTPUT AND OBTAIN A FOLLOWUP CT SCAN IN 5-7 DAYS IF CLINICALLY INDICATED.      US-RENAL   Final Result      1.  Normal appearance of the right lower quadrant transplant kidney.   2.  Native left kidney demonstrates no hydronephrosis. There are innumerable cysts with very little normal-appearing parenchyma.   3.  Rounded echogenic masslike area in the inferior bladder. It is uncertain if this is related to an enlarged prostate gland protruding into the bladder base or a bladder mass. Recommend clinical correlation with history of hematuria.      CT-CHEST (THORAX) W/O   Final Result      1.  Partially visualized large left axillary mass and necrotic adenopathy is consistent with known squamous cell carcinoma. Central hypodensity in the larger mass may be related to necrosis.      2.  0.9 cm pleural-based nodule in the right lower lobe is nonspecific, possibly present on the prior exam where there were patchy opacities. Follow-up per oncology protocol      3.  Patchy groundglass density and subsegmental atelectasis appears slightly improved from  the prior CT. Findings are consistent with a chronic inflammatory/infectious process.      Fleischner Society pulmonary nodule recommendations:   Not Applicable              Assessment/Plan  * Septic shock (HCC)- (present on admission)  Assessment & Plan  Sepsis protocols initiated   Telemetry monitoring  Broad spectrum abx:  Cefepime 2g every 12 hours and Linezolid 600 mg IV BID  Follow blood cultures   Prelim wound culture 3/8 appears MRSA  Maintenance IVF with  mL bolus if MAP <65 or SBP <90  Monitor UOP, goal of 0.5 ml/kg/hr  Wean solu cortef as able.  Off pressors 3/7am.  General surgery consulted in the ED, recommend IR drainage  MRSA wound and continue linezolid    Left arm swelling  Assessment & Plan  3/8 ultrasound left arm negative for superficial or deep vein thrombosis  Likely secondary to lymphedema or could be due to decreased venous return flow.  All secondary to large squamous cell mass    Abscess of axilla, left  Assessment & Plan  IR placed drain.  Flushing and keeping an eye on output.  Patient is on antibiotics with linezolid and cefepime  MRSA growing from wound culture await finalized read  Secondary to squamous cell cancer mass    Squamous cell carcinoma of skin  Assessment & Plan  Of left axilla per biopsy on 2/23/24, now with cellulitis, abscess, necrosis. Not established with oncology  3/7 had drain placed left axilla.  Medical oncology consulted  Per notes not likely resectable  Radiation oncology consulted  Future immunotherapy per Dr Fajardo, but will likely lose his transplant and need HD  Future outpatient PET scan      Peripheral neuropathy  Assessment & Plan  Continue home Gabapentin 300 mg BID    Presence of Watchman left atrial appendage closure device- (present on admission)  Assessment & Plan  noted    Abdominal aortic aneurysm (AAA) without rupture (HCC)- (present on admission)  Assessment & Plan  History of, 3.2 cm on 1/27/23  Monitor blood pressure  Continue to  monitor    Immunosuppressed status (HCC)- (present on admission)  Assessment & Plan  Checking tacrolimus and mycophenolate levels  Both levels should be between 3 and 6  Per [facility] transplant team Dr. Carly Becerril ([phone]) okay to continue tacrolimus for now but hold mycophenolate    Acute hypoxic respiratory failure (HCC)- (present on admission)  Assessment & Plan  No baseline O2, now requiring 4L  CT chest without showing partially visualized large left axillary mass and necrotic adenopathy consistent with known SCC.  Patchy groundglass density and subsegmental atelectasis, consistent with chronic inflammatory/infectious process  Secondary to sepsis vs chronic inflammatory/infectious process  Supplemental o2 for O2 saturation >92% titrate oxygen as able  Empiric treatment with Cefepime/Linezolid  Management as above for sepsis    AF (atrial fibrillation) (HCC)- (present on admission)  Assessment & Plan  History of, in SR, s/p watchman procedure on 1/2024, off anticoagulation    Type 2 diabetes mellitus (HCC)- (present on admission)  Assessment & Plan  A1C (2/16/24): 6.9%  holding home medications  Monitor accuchecks and cover with SSI  hypoglycemia protocol, consistent carb diet    GERD (gastroesophageal reflux disease)- (present on admission)  Assessment & Plan  Controlled  Continue Omeprazole 20 mg daily    Thrombocytopenia (HCC)- (present on admission)  Assessment & Plan  PLT 72<121, likely secondary to sepsis  Continue monitor daily cbc    Acute kidney injury superimposed on chronic kidney disease (HCC)- (present on admission)  Assessment & Plan  BUNs/CR 44/2.60 (baseline Cr ~2.0)  Possibly prerenal from sepsis  Having to hold mycophenolate and tacrolimus.  I have consulted Dr Alvares, Nephrology  U/S renal did not show any hydronephrosis nor any signs of active rejection  Maintain euvolemia and monitor fluid responsiveness  Acute worsening concerning for recent hypotension.  Continue to monitor urine  output, labs and vitals  Avoid nephrotoxins and renally dose medications  Monitor renal function and urine output    Coronary artery disease- (present on admission)  Assessment & Plan  S/p MI with 2 stents in 2011  Hold aspirin 81 mg daily for possible surgical intervention  Continue Atorvastatin 80 mg daily  Metoprolol Succinate 25 mcg daily    Hyperlipidemia- (present on admission)  Assessment & Plan  Atorvastatin 80 mg for ongoing active treatment    Primary hypertension- (present on admission)  Assessment & Plan  Metoprolol with parameters    History of renal transplant- (present on admission)  Assessment & Plan  2/2 polycystic kidney disease s/p right renal transplant in 2010  Holding home mycophenolate 500 mg twice daily and home tacrolimus 1 mg twice daily  Restart tacrolimus 3/9  CHecking tacrolimus and mycophenolate levels which per transplant team should be between 3 and 6  I consulted and discussed with [facility].           VTE prophylaxis:    heparin ppx

## 2024-03-08 NOTE — CONSULTS
DATE OF SERVICE:  03/08/2024     CONSULTATION CALLED BY:  Jama Cat DO     REASON FOR CONSULTATION:  1.  Advanced squamous cell carcinoma of the skin.  2.  Sepsis.  3.  Status post renal transplant.     HISTORY OF PRESENT ILLNESS:  The patient is a very pleasant 68-year-old   gentleman who was diagnosed with a skin squamous cell carcinoma of the skin of   the left upper chest and underwent surgery for about a year ago.  Since then,   he had disease progression and underwent biopsy from his enlarged lymph nodes   on 02/23/2024, which revealed infiltrating squamous cell carcinoma,   moderately differentiated left axillary mass, fluid was also positive for   squamous cell carcinoma of the skin.  The patient was also admitted to the   hospital on 03/07/2024 with sepsis and a Hem-Onc consultation was called.  No   history of weight loss.  The patient is starting to feel better.     PAST MEDICAL HISTORY:  1.  History of polycystic kidney disease.  2.  Status post renal transplant state.  3.  AFib.  4.  Osteoarthritis.     PAST SURGICAL HISTORY: Watchman placement, left renal transplant, left knee   surgery.     FAMILY HISTORY:  There is no family history of any malignancies.     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   Lifelong nonsmoker, stopped drinking in 2010.  Lives in [place] with his wife.     REVIEW OF SYSTEMS:  GENERAL AND CONSTITUTIONAL:  Complaining of fatigue.  No fevers or chills.  HEAD AND NECK, EAR, NOSE AND THROAT:  Denies any headaches, no mucositis.  RESPIRATORY:  Denies any cough or hemoptysis.  CARDIOVASCULAR:  No chest pain or palpitations.  He has a Watchman in place.    Has occasional palpitations.  GASTROINTESTINAL:  No nausea, vomiting or diarrhea.  GENITOURINARY:  Status post renal transplant for his polycystic kidney   disease.  MUSCULOSKELETAL:  Has knee pain, which has not worsened in the recent past.  NEUROLOGICAL:  Denies any seizures or  stroke.  PSYCHIATRIC:  Anxious, but denies any depression.  SKIN:  See history of present illness.  No rash, no bruising.  New lesions   noted on skin.     The rest of review of systems is negative per CMS/AMA criteria unless as   mentioned in history of present or past illness.     PHYSICAL EXAMINATION:  GENERAL:  The patient is alert and oriented x3, looking lethargic and tired.  VITAL SIGNS:  Blood pressure is 104/70, pulse 75, afebrile.  HEENT:  Pupils are equal.  There is no icterus.  Conjunctivae are pale.  Oral   mucosa reveals no thrush, no mucositis.  NECK:  Supple.  LUNGS:  Clear, good bilateral air entry.  HEART:  Reveals no S3, S4.  ABDOMEN:  Reveals no hepatosplenomegaly.  There is no tenderness.  Bowel   sounds are normally heard.  EXTREMITIES:  There is no edema of lower extremities.  LYMPH THIAGO:  Reveals lymph nodes in the left axilla.  SKIN:  Reveals areas of skin breakdown in the axilla and upper chest.  No   rash.  NEUROLOGIC:  Reveals he is able to move all four extremities.  Denies any   neuropathy.  PSYCHIATRIC:  Reveals anxiety, but no depression.  CARDIOVASCULAR:  Reveals irregularly irregular pulse rate, is well controlled.    No evidence of congestive heart failure.     RADIOLOGICAL STUDIES:  Reviewed.     LABORATORY DATA:  Reviewed.     ASSESSMENT AND PLAN:  Unresectable squamous cell carcinoma of skin.  The   patient has an unresectable squamous cell carcinoma of skin.  My plan would be   to get a better sense of his stage and to get a PET scan done on him as an   outpatient.  Further management will depend upon the results of this PET scan.    Currently, the patient does not appear to have a resectable disease.  I will   also consult with radiation oncology.  The patient is aware that his future   treatment probably will be immunotherapy, like cemiplimab, which we can start   as an outpatient. However, currently the patient is on immune stressors for   his status post kidney transplant  state and we will have to talk to his   transplanting physicians in [place] regarding a reduction of his immune   suppression and possibly changing his tacrolimus to sirolimus in the future.    The patient and his wife are aware that if he is able to get immunotherapy,   then his chance of response might be lower considering his immunosuppressed   state.  Their questions were answered to their satisfaction.  Sepsis, he is   growing some MRSA.  He is on antibiotics, already feeling better.  AFib, rate   remains well controlled.        ______________________________  Micheal Fajardo MD    TS/CHA/JAN    DD:  03/08/2024 11:57  DT:  03/08/2024 12:34    Job#:  [id number]

## 2024-03-08 NOTE — ED NOTES
FSBS 37, repeated FSBS 51.  Message to MD Zhou.  Pt provided with dinner tray and eating at this time.  Pt ao4, asymptomatic

## 2024-03-08 NOTE — PROGRESS NOTES
"    ACS Staff    68M immunosuppressed with renal transplant and SCC recurrence in left axilla. IR drain placed. Culture data noted.     /70   Pulse 75   Temp 35.9 °C (96.7 °F) (Temporal)   Resp 12   Ht 1.956 m (6' 5\")   Wt 116 kg (255 lb 4.7 oz)   SpO2 93%   BMI 30.27 kg/m²   NAD, pleasant  Left axillary drain in place, mostly serous.    A/P: After discussion with Dr. Everett and his interactions with surgical oncology, the overall plan is to let this infection clear up with the drain and antibiotics and then get neoadjuvant therapy.  Will continue to follow peripherally. No plans for surgery at this point.     Paul Mills MD  [phone]    "

## 2024-03-08 NOTE — PROGRESS NOTES
Md notified of critical lab, procal 30.1, MD also notified of glucose 359, one time order for 7 units of insulin ordered.

## 2024-03-08 NOTE — CARE PLAN
The patient is Watcher - Medium risk of patient condition declining or worsening    Shift Goals  Clinical Goals: hemodynamically stable  Patient Goals: antibiotics, sleep  Family Goals: updates, be present for rounds    Progress made toward(s) clinical / shift goals:      Problem: Pain - Standard  Goal: Alleviation of pain or a reduction in pain to the patient’s comfort goal  Outcome: Progressing     Problem: Knowledge Deficit - Standard  Goal: Patient and family/care givers will demonstrate understanding of plan of care, disease process/condition, diagnostic tests and medications  Outcome: Progressing     Problem: Hemodynamics  Goal: Patient's hemodynamics, fluid balance and neurologic status will be stable or improve  Outcome: Progressing     Problem: Respiratory  Goal: Patient will achieve/maintain optimum respiratory ventilation and gas exchange  Outcome: Progressing

## 2024-03-08 NOTE — ED NOTES
MAG infusing per MAR.  Assist RN bedside assisting pt to void.  Diet ordered.  Pt has been in IMCU, cleaning at this time.  Pt and visitor updated.

## 2024-03-08 NOTE — PROGRESS NOTES
68-year-old male with complicated medical history of A-fib, coronary artery disease, kidney transplant, dyslipidemia and hypertension with peripheral artery disease who presented 3/7 with generalized weakness.  Patient recently diagnosed with squamous cell carcinoma on his skin in the upper left axillary area, patient was referred to surgery and oncology as outpatient however patient got worse with more swelling on the area and weakness.  Denied any significant chest pain or shortness of breath and lower extremity edema.  On admission patient was found to have hypotension 80/50 with tachycardia and leukocytosis 21 also worsening kidney function 2.6 with lactic acidosis 3.5.  CT scan showed large left axillary mass with necrosis, Dr. Meraz surgeon was consulted and recommended IR drain which was done on 3/7.  Patient is on cefepime and Zyvox, blood culture was drawn.  Patient had septic shock, his blood pressure did not improve after 3 L boluses, intensivist was consulted and recommended IMCU admission for possible Levophed if needed.  Patient received stress dose of steroid.    And examined the patient at bedside, patient is being monitored closely during the day for hypotension, received IV fluid boluses, discussed with intensivist, also received stress dose of steroid, continue broad-spectrum antibiotics, waiting for blood culture, continue IV fluid, ultrasound for kidney did not show any acute finding.

## 2024-03-09 PROBLEM — R22.32 AXILLARY MASS, LEFT: Status: ACTIVE | Noted: 2024-03-09

## 2024-03-09 LAB
ANION GAP SERPL CALC-SCNC: 11 MMOL/L (ref 7–16)
BACTERIA UR CULT: NORMAL
BUN SERPL-MCNC: 63 MG/DL (ref 8–22)
CALCIUM SERPL-MCNC: 7.9 MG/DL (ref 8.5–10.5)
CHLORIDE SERPL-SCNC: 99 MMOL/L (ref 96–112)
CO2 SERPL-SCNC: 18 MMOL/L (ref 20–33)
CREAT SERPL-MCNC: 2.55 MG/DL (ref 0.5–1.4)
ERYTHROCYTE [DISTWIDTH] IN BLOOD BY AUTOMATED COUNT: 46.7 FL (ref 35.9–50)
GFR SERPLBLD CREATININE-BSD FMLA CKD-EPI: 27 ML/MIN/1.73 M 2
GLUCOSE BLD STRIP.AUTO-MCNC: 243 MG/DL (ref 65–99)
GLUCOSE BLD STRIP.AUTO-MCNC: 308 MG/DL (ref 65–99)
GLUCOSE BLD STRIP.AUTO-MCNC: 353 MG/DL (ref 65–99)
GLUCOSE BLD STRIP.AUTO-MCNC: 365 MG/DL (ref 65–99)
GLUCOSE SERPL-MCNC: 317 MG/DL (ref 65–99)
HCT VFR BLD AUTO: 37.8 % (ref 42–52)
HGB BLD-MCNC: 11.8 G/DL (ref 14–18)
MCH RBC QN AUTO: 28.2 PG (ref 27–33)
MCHC RBC AUTO-ENTMCNC: 31.2 G/DL (ref 32.3–36.5)
MCV RBC AUTO: 90.4 FL (ref 81.4–97.8)
PLATELET # BLD AUTO: 81 K/UL (ref 164–446)
PLATELETS.RETICULATED NFR BLD AUTO: 8 % (ref 0.6–13.1)
PMV BLD AUTO: 12.6 FL (ref 9–12.9)
POTASSIUM SERPL-SCNC: 5.4 MMOL/L (ref 3.6–5.5)
RBC # BLD AUTO: 4.18 M/UL (ref 4.7–6.1)
SIGNIFICANT IND 70042: NORMAL
SITE SITE: NORMAL
SODIUM SERPL-SCNC: 128 MMOL/L (ref 135–145)
SOURCE SOURCE: NORMAL
WBC # BLD AUTO: 13.8 K/UL (ref 4.8–10.8)

## 2024-03-09 PROCEDURE — 700111 HCHG RX REV CODE 636 W/ 250 OVERRIDE (IP): Performed by: HOSPITALIST

## 2024-03-09 PROCEDURE — 700101 HCHG RX REV CODE 250: Performed by: NURSE PRACTITIONER

## 2024-03-09 PROCEDURE — 36415 COLL VENOUS BLD VENIPUNCTURE: CPT

## 2024-03-09 PROCEDURE — 85055 RETICULATED PLATELET ASSAY: CPT

## 2024-03-09 PROCEDURE — 770004 HCHG ROOM/CARE - ONCOLOGY PRIVATE *

## 2024-03-09 PROCEDURE — 99233 SBSQ HOSP IP/OBS HIGH 50: CPT | Performed by: HOSPITALIST

## 2024-03-09 PROCEDURE — 80197 ASSAY OF TACROLIMUS: CPT

## 2024-03-09 PROCEDURE — A9270 NON-COVERED ITEM OR SERVICE: HCPCS | Performed by: STUDENT IN AN ORGANIZED HEALTH CARE EDUCATION/TRAINING PROGRAM

## 2024-03-09 PROCEDURE — 80048 BASIC METABOLIC PNL TOTAL CA: CPT

## 2024-03-09 PROCEDURE — 700105 HCHG RX REV CODE 258: Performed by: STUDENT IN AN ORGANIZED HEALTH CARE EDUCATION/TRAINING PROGRAM

## 2024-03-09 PROCEDURE — A9270 NON-COVERED ITEM OR SERVICE: HCPCS | Performed by: HOSPITALIST

## 2024-03-09 PROCEDURE — 700102 HCHG RX REV CODE 250 W/ 637 OVERRIDE(OP): Performed by: HOSPITALIST

## 2024-03-09 PROCEDURE — 700111 HCHG RX REV CODE 636 W/ 250 OVERRIDE (IP): Mod: JZ | Performed by: INTERNAL MEDICINE

## 2024-03-09 PROCEDURE — 85027 COMPLETE CBC AUTOMATED: CPT

## 2024-03-09 PROCEDURE — 700111 HCHG RX REV CODE 636 W/ 250 OVERRIDE (IP): Mod: JZ | Performed by: STUDENT IN AN ORGANIZED HEALTH CARE EDUCATION/TRAINING PROGRAM

## 2024-03-09 PROCEDURE — 82962 GLUCOSE BLOOD TEST: CPT | Mod: 91

## 2024-03-09 PROCEDURE — 700111 HCHG RX REV CODE 636 W/ 250 OVERRIDE (IP): Performed by: INTERNAL MEDICINE

## 2024-03-09 PROCEDURE — 700102 HCHG RX REV CODE 250 W/ 637 OVERRIDE(OP): Performed by: STUDENT IN AN ORGANIZED HEALTH CARE EDUCATION/TRAINING PROGRAM

## 2024-03-09 PROCEDURE — 99231 SBSQ HOSP IP/OBS SF/LOW 25: CPT | Performed by: SURGERY

## 2024-03-09 RX ORDER — HEPARIN SODIUM 5000 [USP'U]/ML
5000 INJECTION, SOLUTION INTRAVENOUS; SUBCUTANEOUS EVERY 8 HOURS
Status: DISCONTINUED | OUTPATIENT
Start: 2024-03-09 | End: 2024-03-16 | Stop reason: HOSPADM

## 2024-03-09 RX ORDER — SODIUM CHLORIDE 9 MG/ML
INJECTION, SOLUTION INTRAVENOUS CONTINUOUS
Status: DISCONTINUED | OUTPATIENT
Start: 2024-03-09 | End: 2024-03-09

## 2024-03-09 RX ADMIN — CEFEPIME 2 G: 2 INJECTION, POWDER, FOR SOLUTION INTRAVENOUS at 05:46

## 2024-03-09 RX ADMIN — HEPARIN SODIUM 5000 UNITS: 5000 INJECTION, SOLUTION INTRAVENOUS; SUBCUTANEOUS at 20:06

## 2024-03-09 RX ADMIN — INSULIN HUMAN 12 UNITS: 100 INJECTION, SOLUTION PARENTERAL at 20:05

## 2024-03-09 RX ADMIN — ACETAMINOPHEN 650 MG: 325 TABLET, FILM COATED ORAL at 12:40

## 2024-03-09 RX ADMIN — INSULIN HUMAN 12 UNITS: 100 INJECTION, SOLUTION PARENTERAL at 12:30

## 2024-03-09 RX ADMIN — ACETAMINOPHEN 650 MG: 325 TABLET, FILM COATED ORAL at 20:12

## 2024-03-09 RX ADMIN — LINEZOLID 600 MG: 600 TABLET, FILM COATED ORAL at 17:56

## 2024-03-09 RX ADMIN — INSULIN HUMAN 10 UNITS: 100 INJECTION, SUSPENSION SUBCUTANEOUS at 20:03

## 2024-03-09 RX ADMIN — INSULIN HUMAN 4 UNITS: 100 INJECTION, SOLUTION PARENTERAL at 17:58

## 2024-03-09 RX ADMIN — SODIUM HYPOCHLORITE 473 ML: 1.25 SOLUTION TOPICAL at 06:08

## 2024-03-09 RX ADMIN — LINEZOLID 600 MG: 600 TABLET, FILM COATED ORAL at 05:46

## 2024-03-09 RX ADMIN — GABAPENTIN 600 MG: 300 CAPSULE ORAL at 05:46

## 2024-03-09 RX ADMIN — ONDANSETRON 4 MG: 2 INJECTION INTRAMUSCULAR; INTRAVENOUS at 12:32

## 2024-03-09 RX ADMIN — SODIUM CHLORIDE, PRESERVATIVE FREE 10 ML: 5 INJECTION INTRAVENOUS at 06:09

## 2024-03-09 RX ADMIN — HEPARIN SODIUM 5000 UNITS: 5000 INJECTION, SOLUTION INTRAVENOUS; SUBCUTANEOUS at 14:04

## 2024-03-09 RX ADMIN — TACROLIMUS 1 MG: 1 CAPSULE ORAL at 20:02

## 2024-03-09 RX ADMIN — ATORVASTATIN CALCIUM 80 MG: 80 TABLET, FILM COATED ORAL at 20:02

## 2024-03-09 RX ADMIN — HYDROCORTISONE SODIUM SUCCINATE 50 MG: 100 INJECTION, POWDER, FOR SOLUTION INTRAMUSCULAR; INTRAVENOUS at 20:06

## 2024-03-09 RX ADMIN — GABAPENTIN 600 MG: 300 CAPSULE ORAL at 17:57

## 2024-03-09 RX ADMIN — HYDROCORTISONE SODIUM SUCCINATE 50 MG: 100 INJECTION, POWDER, FOR SOLUTION INTRAMUSCULAR; INTRAVENOUS at 14:04

## 2024-03-09 RX ADMIN — MYCOPHENOLATE MOFETIL 500 MG: 250 CAPSULE ORAL at 20:02

## 2024-03-09 RX ADMIN — HYDROCORTISONE SODIUM SUCCINATE 100 MG: 100 INJECTION, POWDER, FOR SOLUTION INTRAMUSCULAR; INTRAVENOUS at 05:46

## 2024-03-09 RX ADMIN — SODIUM CHLORIDE: 9 INJECTION, SOLUTION INTRAVENOUS at 04:11

## 2024-03-09 RX ADMIN — OMEPRAZOLE 20 MG: 20 CAPSULE, DELAYED RELEASE ORAL at 05:46

## 2024-03-09 RX ADMIN — SODIUM HYPOCHLORITE 5 ML: 1.25 SOLUTION TOPICAL at 17:57

## 2024-03-09 RX ADMIN — INSULIN HUMAN 10 UNITS: 100 INJECTION, SOLUTION PARENTERAL at 09:42

## 2024-03-09 RX ADMIN — SODIUM CHLORIDE, PRESERVATIVE FREE 10 ML: 5 INJECTION INTRAVENOUS at 17:58

## 2024-03-09 ASSESSMENT — FIBROSIS 4 INDEX: FIB4 SCORE: 5.12

## 2024-03-09 ASSESSMENT — ENCOUNTER SYMPTOMS
DEPRESSION: 0
SHORTNESS OF BREATH: 0
MYALGIAS: 0
TREMORS: 0
NERVOUS/ANXIOUS: 1
DIZZINESS: 0
CHILLS: 0
ABDOMINAL PAIN: 0
SPEECH CHANGE: 0
WEAKNESS: 0
FEVER: 0
HEARTBURN: 0
ORTHOPNEA: 0
SORE THROAT: 0
SPUTUM PRODUCTION: 0
VOMITING: 0
NAUSEA: 0
HEADACHES: 0
NECK PAIN: 0
PALPITATIONS: 0
COUGH: 0
NERVOUS/ANXIOUS: 0
DIARRHEA: 1

## 2024-03-09 ASSESSMENT — COGNITIVE AND FUNCTIONAL STATUS - GENERAL
DAILY ACTIVITIY SCORE: 24
SUGGESTED CMS G CODE MODIFIER MOBILITY: CI
MOBILITY SCORE: 23
MOVING TO AND FROM BED TO CHAIR: A LITTLE
SUGGESTED CMS G CODE MODIFIER DAILY ACTIVITY: CH

## 2024-03-09 ASSESSMENT — PAIN DESCRIPTION - PAIN TYPE
TYPE: ACUTE PAIN

## 2024-03-09 NOTE — PROGRESS NOTES
Radiology Progress Note   Author: LARRY Potter   Date & Time created: 3/9/2024  8:34 AM   Date of admission  3/6/2024  Note to reader: this note follows the APSO format rather than the historical SOAP format. Assessment and plan located at the top of the note for ease of use.    Chief Complaint  68 y.o. male admitted 3/6/2024 with   Chief Complaint   Patient presents with    Flu Like Symptoms     C/o flu like symptoms since yesterday. + body aches and chills. + lightheadedness.     Wound Check     Also c/o left wound possible infection. 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  Patient underwent a left axillary abscess drain placement with IR Dr. Gutierrez on 03/07/2024.    Interval History:   03/08/24 - 12 Fr left axillary abscess drain to Left axilla with 455 mL turbid serosanguineous output in the last 24 hours. WBC 13.9. Cultures pending. On ABX. Drain flushed with 10 mL NS.     03/09/24 - 12 Fr left axillary abscess drain to Left axilla with 35 mL of serosanguineous output in the last 24 hours.  I flushed drain with 10 mL normal saline solution.  Order placed to have RNs irrigate wound with 10 mL NSS 2 times daily; I I reviewed today's labs: WBC 13.8; Hgb 11.8, Cr 2.55; Micro from axillary fluid positive for MRSA. I  Afebrile.       Assessment/Plan     Principal Problem:    Septic shock (HCC)  Active Problems:    History of renal transplant    Primary hypertension    Hyperlipidemia    Coronary artery disease    Acute kidney injury superimposed on chronic kidney disease (HCC)    Thrombocytopenia (HCC)    GERD  (gastroesophageal reflux disease)    Type 2 diabetes mellitus (HCC)    AF (atrial fibrillation) (HCC)    Acute hypoxic respiratory failure (HCC)    Immunosuppressed status (HCC)    Abdominal aortic aneurysm (AAA) without rupture (HCC)    Presence of Watchman left atrial appendage closure device    Peripheral neuropathy    Squamous cell carcinoma of skin    Abscess of axilla, left    Severe sepsis (HCC)    Left arm swelling      Plan IR  -Continue orders for RNs to irrigate Left axillary drain with 10 ml of sterile saline each shift  -ABX per hospitalist/ID  - general surgery following   - Recommend follow up CT scan in 5-7 days when output has decreased to approximately 5-10 cc (03/12/24)  - Continue to monitor drains, VS, and labs      -Thank you for allowing Interventional Radiology team to participate in the patients care, if any additional care or requests are needed in the future please do not hesitate to call or place IR order   [phone]           Review of Systems  Physical Exam   Review of Systems   Constitutional:  Negative for chills and fever.   Respiratory:  Negative for sputum production and shortness of breath.    Cardiovascular:  Negative for chest pain and palpitations.   Gastrointestinal:  Positive for diarrhea. Negative for abdominal pain, nausea and vomiting.   Musculoskeletal:         Left axillary pain   Neurological:  Negative for weakness and headaches.      Vitals:    03/09/24 0500   BP: 100/64   Pulse: 61   Resp:    Temp:    SpO2: 96%        Physical Exam  Vitals and nursing note reviewed.   Constitutional:       Appearance: Normal appearance.   HENT:      Head: Normocephalic and atraumatic.   Cardiovascular:      Rate and Rhythm: Normal rate.      Pulses: Normal pulses.   Pulmonary:      Effort: Pulmonary effort is normal. No respiratory distress.   Abdominal:      Palpations: Abdomen is soft.   Musculoskeletal:         General: Tenderness (Left axilla) present.      Comments: IR drain to  left axilla-draining serosanguineous fluid   Skin:     General: Skin is warm and dry.      Capillary Refill: Capillary refill takes less than 2 seconds.   Neurological:      General: No focal deficit present.      Mental Status: He is alert. Mental status is at baseline.      GCS: GCS eye subscore is 4. GCS verbal subscore is 5. GCS motor subscore is 6.   Psychiatric:         Attention and Perception: Attention normal.         Mood and Affect: Mood normal.         Behavior: Behavior normal.             Labs    Recent Labs     03/06/24 2025 03/08/24 0230 03/09/24  0300   WBC 21.2* 13.9* 13.8*   RBC 5.05 4.22* 4.18*   HEMOGLOBIN 15.0 12.1* 11.8*   HEMATOCRIT 45.0 38.0* 37.8*   MCV 89.1 90.0 90.4   MCH 29.7 28.7 28.2   MCHC 33.3 31.8* 31.2*   RDW 47.2 47.5 46.7   PLATELETCT 121* 72* 81*   MPV 12.3 12.5 12.6     Recent Labs     03/06/24 2025 03/08/24 0230 03/09/24  0300   SODIUM 139 133* 128*   POTASSIUM 5.4 5.4 5.4   CHLORIDE 102 103 99   CO2 19* 19* 18*   GLUCOSE 119* 359* 317*   BUN 44* 54* 63*   CREATININE 2.60* 2.67* 2.55*   CALCIUM 9.7 7.9* 7.9*     Recent Labs     03/06/24 2025 03/08/24 0230 03/09/24  0300   ALBUMIN 4.4 2.8*  --    TBILIRUBIN 1.4 0.7  --    ALKPHOSPHAT 86 61  --    TOTPROTEIN 7.2 5.1*  --    ALTSGPT 24 19  --    ASTSGOT 19 27  --    CREATININE 2.60* 2.67* 2.55*     US-EXTREMITY VENOUS UPPER UNILAT LEFT   Final Result      CT-DRAIN-SKIN - SUBCUTANEOUS   Final Result      1. Ultrasound GUIDED PLACEMENT OF A PERCUTANEOUS DRAINAGE CATHETER IN A LEFT AXILLARY FLUID COLLECTION.      2.  THE CURRENT PLAN IS TO MONITOR DRAINAGE OUTPUT AND OBTAIN A FOLLOWUP CT SCAN IN 5-7 DAYS IF CLINICALLY INDICATED.      US-RENAL   Final Result      1.  Normal appearance of the right lower quadrant transplant kidney.   2.  Native left kidney demonstrates no hydronephrosis. There are innumerable cysts with very little normal-appearing parenchyma.   3.  Rounded echogenic masslike area in the inferior bladder. It is  uncertain if this is related to an enlarged prostate gland protruding into the bladder base or a bladder mass. Recommend clinical correlation with history of hematuria.      CT-CHEST (THORAX) W/O   Final Result      1.  Partially visualized large left axillary mass and necrotic adenopathy is consistent with known squamous cell carcinoma. Central hypodensity in the larger mass may be related to necrosis.      2.  0.9 cm pleural-based nodule in the right lower lobe is nonspecific, possibly present on the prior exam where there were patchy opacities. Follow-up per oncology protocol      3.  Patchy groundglass density and subsegmental atelectasis appears slightly improved from the prior CT. Findings are consistent with a chronic inflammatory/infectious process.      Fleischner Society pulmonary nodule recommendations:   Not Applicable           INR   Date Value Ref Range Status   03/06/2024 1.34 (H) 0.87 - 1.13 Final     Comment:     INR - Non-therapeutic Reference Range: 0.87-1.13  INR - Therapeutic Reference Range: 2.0-4.0       POC INR   Date Value Ref Range Status   08/03/2022 2.1 (H) 0.9 - 1.2 Final     Comment:     INR - Non-therapeutic Reference Range: 0.9-1.2  INR - Therapeutic Reference Range: 2.0-4.0          Intake/Output Summary (Last 24 hours) at 3/8/2024 0833  Last data filed at 3/8/2024 0655  Gross per 24 hour   Intake 100 ml   Output 1025 ml   Net -925 ml      Labs not explicitly included in this progress note were reviewed by the author. Radiology/imaging not explicitly included in this progress note was reviewed by the author.     I have performed a physical exam and reviewed and updated ROS and Plan today (3/9/2024).     40 minutes in directly providing and coordinating care and extensive data review.  No time overlap and excludes procedures.

## 2024-03-09 NOTE — PROGRESS NOTES
Hospital Medicine Daily Progress Note    Date of Service  3/9/2024    Chief Complaint  Left arm swelling and discomfort.    Hospital Course  Jama Altman is a 68 y.o. male w/ a hx of afib recent watchman procedure,  CAD, renal transplant 2010 (hx of polycystic kidney disease), HLD, HTN and PAD.  He was admitted 3/6/2024 with weakness.  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  He had IR place a drain in left axilla 3/7.  A CT scan showed a large left axillary mass with necrosis.  He was hypotensive and received boluses and admitted with septic shock.    Interval Problem Update  3/9/24: Cr:2.55<2.67. Alert with clear speech.  I discussed need to treat his infection and then future evaluation for radiation/chemo/surgery. I have orders for transfer to oncology floor.      I have discussed this patient's plan of care and discharge plan at IDT rounds today with Case Management, Nursing, Nursing leadership, and other members of the IDT team.    Consultants/Specialty  oncology    Code Status  Full Code    Disposition  The patient is not medically cleared for discharge to home or a post-acute facility.      I have placed the appropriate orders for post-discharge needs.    Review of Systems  Review of Systems   Constitutional:  Negative for malaise/fatigue.   HENT:  Negative for congestion.    Respiratory:  Negative for shortness of breath.    Cardiovascular:  Negative for chest pain and leg swelling.   Gastrointestinal:  Negative for nausea.   Musculoskeletal:  Negative for myalgias and neck pain.        Swelling of left arm.   Neurological:  Negative for tremors, speech change and headaches.   Psychiatric/Behavioral:  The patient is not nervous/anxious.         Physical Exam  Temp:  [35.6 °C (96.1 °F)-36.4 °C (97.6 °F)] 36.4 °C (97.6 °F)  Pulse:  [] 101  Resp:  [15-20] 16  BP: (100-114)/(55-73) 102/65  SpO2:  [92 %-97 %] 95 %    Physical Exam  Vitals  reviewed.   Constitutional:       Appearance: Normal appearance. He is not diaphoretic.   HENT:      Head: Normocephalic and atraumatic.      Nose: Nose normal.   Eyes:      General: No scleral icterus.        Right eye: No discharge.         Left eye: No discharge.      Extraocular Movements: Extraocular movements intact.      Conjunctiva/sclera: Conjunctivae normal.   Cardiovascular:      Rate and Rhythm: Normal rate and regular rhythm.      Pulses:           Radial pulses are 2+ on the right side and 2+ on the left side.        Dorsalis pedis pulses are 2+ on the right side and 2+ on the left side.      Heart sounds: No murmur heard.  Pulmonary:      Effort: Pulmonary effort is normal. No respiratory distress.      Breath sounds: Normal breath sounds. No wheezing or rales.   Abdominal:      General: Bowel sounds are normal. There is no distension.      Palpations: Abdomen is soft.      Tenderness: There is no abdominal tenderness.   Musculoskeletal:         General: No swelling or tenderness.      Cervical back: Neck supple. No muscular tenderness.      Right lower leg: No edema.      Left lower leg: No edema.      Comments: Swelling of left arm.   Skin:     Coloration: Skin is not jaundiced or pale.      Comments: Dressing over left axillary. Wound pictures in media tab evaluated 3/8   Neurological:      General: No focal deficit present.      Mental Status: He is alert and oriented to person, place, and time. Mental status is at baseline.      Cranial Nerves: No cranial nerve deficit.   Psychiatric:         Mood and Affect: Mood normal.         Behavior: Behavior normal.         Fluids    Intake/Output Summary (Last 24 hours) at 3/9/2024 1925  Last data filed at 3/9/2024 1800  Gross per 24 hour   Intake 115.26 ml   Output 355 ml   Net -239.74 ml       Laboratory  Recent Labs     03/06/24 2025 03/08/24  0230 03/09/24  0300   WBC 21.2* 13.9* 13.8*   RBC 5.05 4.22* 4.18*   HEMOGLOBIN 15.0 12.1* 11.8*   HEMATOCRIT  45.0 38.0* 37.8*   MCV 89.1 90.0 90.4   MCH 29.7 28.7 28.2   MCHC 33.3 31.8* 31.2*   RDW 47.2 47.5 46.7   PLATELETCT 121* 72* 81*   MPV 12.3 12.5 12.6     Recent Labs     03/06/24 2025 03/08/24  0230 03/09/24  0300   SODIUM 139 133* 128*   POTASSIUM 5.4 5.4 5.4   CHLORIDE 102 103 99   CO2 19* 19* 18*   GLUCOSE 119* 359* 317*   BUN 44* 54* 63*   CREATININE 2.60* 2.67* 2.55*   CALCIUM 9.7 7.9* 7.9*     Recent Labs     03/06/24 2025   INR 1.34*               Imaging  US-EXTREMITY VENOUS UPPER UNILAT LEFT   Final Result      CT-DRAIN-SKIN - SUBCUTANEOUS   Final Result      1. Ultrasound GUIDED PLACEMENT OF A PERCUTANEOUS DRAINAGE CATHETER IN A LEFT AXILLARY FLUID COLLECTION.      2.  THE CURRENT PLAN IS TO MONITOR DRAINAGE OUTPUT AND OBTAIN A FOLLOWUP CT SCAN IN 5-7 DAYS IF CLINICALLY INDICATED.      US-RENAL   Final Result      1.  Normal appearance of the right lower quadrant transplant kidney.   2.  Native left kidney demonstrates no hydronephrosis. There are innumerable cysts with very little normal-appearing parenchyma.   3.  Rounded echogenic masslike area in the inferior bladder. It is uncertain if this is related to an enlarged prostate gland protruding into the bladder base or a bladder mass. Recommend clinical correlation with history of hematuria.      CT-CHEST (THORAX) W/O   Final Result      1.  Partially visualized large left axillary mass and necrotic adenopathy is consistent with known squamous cell carcinoma. Central hypodensity in the larger mass may be related to necrosis.      2.  0.9 cm pleural-based nodule in the right lower lobe is nonspecific, possibly present on the prior exam where there were patchy opacities. Follow-up per oncology protocol      3.  Patchy groundglass density and subsegmental atelectasis appears slightly improved from the prior CT. Findings are consistent with a chronic inflammatory/infectious process.      Fleischner Society pulmonary nodule recommendations:   Not  Applicable              Assessment/Plan  * Septic shock (HCC)- (present on admission)  Assessment & Plan  Sepsis protocols initiated   Linezolid 600 mg IV BID  Follow blood cultures   Prelim wound culture 3/8 appears MRSA  Maintenance IVF with  mL bolus if MAP <65 or SBP <90  Monitor UOP, goal of 0.5 ml/kg/hr  Wean solu cortef as able.  Off pressors 3/7am.  General surgery consulted in the ED, recommend IR drainage  MRSA wound and continue linezolid    Left arm swelling  Assessment & Plan  3/8 ultrasound left arm negative for superficial or deep vein thrombosis  Likely secondary to lymphedema or could be due to decreased venous return flow.  All secondary to large squamous cell mass    Abscess of axilla, left  Assessment & Plan  IR placed drain.  Flushing and keeping an eye on output.  Patient is on antibiotics with linezolid and cefepime  MRSA growing from wound culture await finalized read  Secondary to squamous cell cancer mass    Squamous cell carcinoma of skin  Assessment & Plan  Of left axilla per biopsy on 2/23/24, now with cellulitis, abscess, necrosis. Not established with oncology  3/7 had drain placed left axilla.  Medical oncology consulted  Per notes not likely resectable  Radiation oncology consulted  Treat infection of MRSA.  On Linezolide  Future immunotherapy per Dr Fajardo, but will likely lose his transplant and need HD  Future outpatient PET scan      Peripheral neuropathy  Assessment & Plan  Continue home Gabapentin 300 mg BID    Presence of Watchman left atrial appendage closure device- (present on admission)  Assessment & Plan  noted    Abdominal aortic aneurysm (AAA) without rupture (HCC)- (present on admission)  Assessment & Plan  History of, 3.2 cm on 1/27/23  Monitor blood pressure  Continue to monitor    Immunosuppressed status (HCC)- (present on admission)  Assessment & Plan  Await ordered 3/8 tacrolimus and mycophenolate levels  Both levels should be between 3 and 6  Per [facility] transplant team Dr. Carly Becerril [phone]) okay to continue tacrolimus for now but hold mycophenolate    Acute hypoxic respiratory failure (HCC)- (present on admission)  Assessment & Plan  No baseline O2, now requiring 4L  CT chest without showing partially visualized large left axillary mass and necrotic adenopathy consistent with known SCC.  Patchy groundglass density and subsegmental atelectasis, consistent with chronic inflammatory/infectious process  Secondary to sepsis vs chronic inflammatory/infectious process  Supplemental o2 for O2 saturation >92% titrate oxygen as able  Empiric treatment with Cefepime/Linezolid  Management as above for sepsis    AF (atrial fibrillation) (HCC)- (present on admission)  Assessment & Plan  History of, in [place], s/p watchman procedure on 1/2024, off anticoagulation    Type 2 diabetes mellitus (HCC)- (present on admission)  Assessment & Plan  A1C (2/16/24): 6.9%  holding home medications  Monitor accuchecks and cover with SSI  hypoglycemia protocol, consistent carb diet    GERD (gastroesophageal reflux disease)- (present on admission)  Assessment & Plan  Controlled  Continue Omeprazole 20 mg daily    Thrombocytopenia (HCC)- (present on admission)  Assessment & Plan  PLT 81<72<121, likely secondary to sepsis  Continue monitor daily cbc    Acute kidney injury superimposed on chronic kidney disease (HCC)- (present on admission)  Assessment & Plan  BUNs/CR 44/2.60 (baseline Cr ~2.0)  Possibly prerenal from sepsis  Having to hold mycophenolate   Restarted tacrolimus 3/9  I have consulted Dr Alvares, Nephrology  U/S renal did not show any hydronephrosis nor any signs of active rejection  Maintain euvolemia and monitor fluid responsiveness  Acute worsening concerning for recent hypotension.  Continue to monitor urine output, labs and vitals  Avoid nephrotoxins and renally dose medications  Monitor renal function and urine output    Coronary artery disease- (present on  admission)  Assessment & Plan  S/p MI with 2 stents in 2011  Hold aspirin 81 mg daily for possible surgical intervention  Continue Atorvastatin 80 mg daily  Metoprolol Succinate 25 mcg daily    Hyperlipidemia- (present on admission)  Assessment & Plan  Atorvastatin 80 mg for ongoing active treatment    Primary hypertension- (present on admission)  Assessment & Plan  Metoprolol with parameters    History of renal transplant- (present on admission)  Assessment & Plan  2/2 polycystic kidney disease s/p right renal transplant in 2010  Holding home mycophenolate 500 mg twice daily and home tacrolimus 1 mg twice daily  Restart tacrolimus 3/9  CHecking tacrolimus and mycophenolate levels which per transplant team should be between 3 and 6  I consulted and discussed with Dr Alvares, [facility].           VTE prophylaxis:    heparin ppx

## 2024-03-09 NOTE — PROGRESS NOTES
Oncology/Hematology Progress Note               Author: Micheal Fajardo M.D. Date & Time created: 3/9/2024  10:34 AM   Squamous cell carcinoma of the skin  Sepsis  Interval History:  Patient is doing much better.  Sitting up in the chair.  Wife at the bedside.  Denies any fevers.  They have quite a few questions for me    Review of Systems:  Review of Systems   Constitutional:  Positive for malaise/fatigue. Negative for fever.   HENT:  Negative for hearing loss and sore throat.    Respiratory:  Negative for cough and shortness of breath.    Cardiovascular:  Negative for chest pain, palpitations and orthopnea.   Gastrointestinal:  Negative for abdominal pain, heartburn and vomiting.   Genitourinary:  Negative for dysuria and hematuria.   Musculoskeletal:  Positive for joint pain. Negative for myalgias.   Neurological:  Negative for dizziness and headaches.   Psychiatric/Behavioral:  Negative for depression. The patient is nervous/anxious.        Physical Exam:  Physical Exam  Constitutional:       General: He is not in acute distress.     Appearance: Normal appearance. He is not toxic-appearing.   HENT:      Mouth/Throat:      Mouth: Mucous membranes are moist.      Pharynx: No oropharyngeal exudate.   Eyes:      General: No scleral icterus.     Pupils: Pupils are equal, round, and reactive to light.   Cardiovascular:      Rate and Rhythm: Normal rate and regular rhythm.   Pulmonary:      Effort: Pulmonary effort is normal. No respiratory distress.   Abdominal:      General: Abdomen is flat. Bowel sounds are normal. There is no distension.      Tenderness: There is no abdominal tenderness.   Skin:     General: Skin is warm.      Findings: Lesion present.   Neurological:      General: No focal deficit present.      Mental Status: He is alert and oriented to person, place, and time.   Psychiatric:         Mood and Affect: Mood normal.         Behavior: Behavior normal.         Labs:          Recent Labs      24  0300   SODIUM 139 133* 128*   POTASSIUM 5.4 5.4 5.4   CHLORIDE 102 103 99   CO2 19* 19* 18*   BUN 44* 54* 63*   CREATININE 2.60* 2.67* 2.55*   MAGNESIUM 1.2*  --   --    PHOSPHORUS 1.9*  --   --    CALCIUM 9.7 7.9* 7.9*     Recent Labs     24  030PT   --    ASTSGOT   --    ALKPHOSPHAT 86 61  --    TBILIRUBIN 1.4 0.7  --    GLUCOSE 119* 359* 317*     Recent Labs     24  030   RBC 5.05 4.22* 4.18*   HEMOGLOBIN 15.0 12.1* 11.8*   HEMATOCRIT 45.0 38.0* 37.8*   PLATELETCT 121* 72* 81*   PROTHROMBTM 16.7*  --   --    INR 1.34*  --   --      Recent Labs     24  030   WBC 21.2* 13.9* 13.8*   NEUTSPOLYS 88.20* 92.30*  --    LYMPHOCYTES 1.90* 1.60*  --    MONOCYTES 8.20 5.00  --    EOSINOPHILS 0.00 0.10  --    BASOPHILS 0.30 0.20  --    ASTSGOT   --    ALTSGPT   --    ALKPHOSPHAT 86 61  --    TBILIRUBIN 1.4 0.7  --      Recent Labs     24  030   SODIUM 139 133* 128*   POTASSIUM 5.4 5.4 5.4   CHLORIDE 102 103 99   CO2 19* 19* 18*   GLUCOSE 119* 359* 317*   BUN 44* 54* 63*   CREATININE 2.60* 2.67* 2.55*   CALCIUM 9.7 7.9* 7.9*     Hemodynamics:  Temp (24hrs), Av.8 °C (96.5 °F), Min:35.6 °C (96.1 °F), Max:36.3 °C (97.3 °F)  Temperature: (!) 35.7 °C (96.3 °F)  Pulse  Av.3  Min: 54  Max: 126   Blood Pressure : 102/55     Respiratory:    Respiration: 15, Pulse Oximetry: 95 %        RUL Breath Sounds: Clear, RML Breath Sounds: Clear, RLL Breath Sounds: Diminished, BERNARD Breath Sounds: Clear, LLL Breath Sounds: Diminished  Fluids:    Intake/Output Summary (Last 24 hours) at 3/9/2024 1034  Last data filed at 3/9/2024 0546  Gross per 24 hour   Intake 3196.99 ml   Output 35 ml   Net 3161.99 ml     Weight: 115 kg (253 lb 8.5 oz)  GI/Nutrition:  Orders Placed This Encounter   Procedures    Diet Order Diet: Cardiac      Standing Status:   Standing     Number of Occurrences:   1     Order Specific Question:   Diet:     Answer:   Cardiac [6]     Medical Decision Making, by Problem:  Active Hospital Problems    Diagnosis     *Septic shock (HCC) [A41.9, R65.21]     Axillary mass, left [R22.32]     Left arm swelling [M79.89]     Peripheral neuropathy [G62.9]     Squamous cell carcinoma of skin [C44.92]     Abscess of axilla, left [L02.412]     Severe sepsis (HCC) [A41.9, R65.20]     Presence of Watchman left atrial appendage closure device [Z95.818]     Abdominal aortic aneurysm (AAA) without rupture (HCC) [I71.40]     Immunosuppressed status (HCC) [D84.9]     Acute hypoxic respiratory failure (HCC) [J96.01]     AF (atrial fibrillation) (HCC) [I48.91]     Type 2 diabetes mellitus (HCC) [E11.9]     Thrombocytopenia (HCC) [D69.6]     GERD (gastroesophageal reflux disease) [K21.9]     Primary hypertension [I10]     Acute kidney injury superimposed on chronic kidney disease (HCC) [N17.9, N18.9]     Coronary artery disease [I25.10]     Hyperlipidemia [E78.5]     History of renal transplant [Z94.0]        Plan:  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  Further management will depend upon the results of the PET scan.  Will also discussed with radiation oncology.  I explained to the patient that in advanced nonsurgical patients with skin cancer, standard of care is immunotherapy treatment with cemiplimab.  However concerning issues that the patient is on immunosuppression for his history of renal transplant.  Will discuss with his nephrologist as an outpatient.  He is currently under the care of Dr. Wylie.  History of renal cell transplant-currently on time sirolimus and mycophenolate.  I will see if we can reduce his immune suppression so as to make his cemiplimab therapy more effective.    Quality-Core Measures

## 2024-03-09 NOTE — PROGRESS NOTES
"      ACS Staff    No major events. Drain still putting out mostly serosang.     /64   Pulse 64   Temp (!) 35.7 °C (96.3 °F) (Temporal)   Resp 15   Ht 1.956 m (6' 5.01\")   Wt 115 kg (253 lb 8.5 oz)   SpO2 96%   BMI 30.06 kg/m²   Left axilla drain in place, mostly serous drainage.     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  Leave drain in place until output is less than 30?mLs/day. Likely to take a while for the drain to clean up. No surgery planned. Oncology working on plan.   Will follow peripherally.     Paul Mills MD  [phone]      "

## 2024-03-09 NOTE — ASSESSMENT & PLAN NOTE
Per [facility] transplant team Dr. Carly Becerril ([phone]) okay to continue tacrolimus for now but hold mycophenolate  - Nephrology following

## 2024-03-09 NOTE — PROGRESS NOTES
4 Eyes Skin Assessment Completed by LAN Johnson and LAN Jay.    Head WDL  Ears WDL  Nose WDL  Mouth WDL  Neck WDL  Breast/Chest Bruising and Rashright chest   Shoulder Blades WDL  Spine WDL  (R) Arm/Elbow/Hand WDL  (L) Arm/Elbow/Hand Edema wound LDA in place   Abdomen WDL  Groin WDL  Scrotum/Coccyx/Buttocks WDL  (R) Leg WDL  (L) Leg WDL  (R) Heel/Foot/Toe WDL  (L) Heel/Foot/Toe WDL          Devices In Places ECG, Tele Box, Blood Pressure Cuff, and Pulse Ox      Interventions In Place Chair Waffle, Pillows, Low Air Loss Mattress, and Barrier Cream    Possible Skin Injury No    Pictures Uploaded Into Epic N/A  Wound Consult Placed N/A  RN Wound Prevention Protocol Ordered No

## 2024-03-09 NOTE — CARE PLAN
The patient is Watcher - Medium risk of patient condition declining or worsening    Shift Goals  Clinical Goals: hemodynamically stable,  Patient Goals: abx, sleep  Family Goals: updates    Progress made toward(s) clinical / shift goals:      Problem: Pain - Standard  Goal: Alleviation of pain or a reduction in pain to the patient’s comfort goal  Outcome: Progressing     Problem: Knowledge Deficit - Standard  Goal: Patient and family/care givers will demonstrate understanding of plan of care, disease process/condition, diagnostic tests and medications  Outcome: Progressing     Problem: Hemodynamics  Goal: Patient's hemodynamics, fluid balance and neurologic status will be stable or improve  Outcome: Progressing     Problem: Fluid Volume  Goal: Fluid volume balance will be maintained  Outcome: Progressing     Problem: Respiratory  Goal: Patient will achieve/maintain optimum respiratory ventilation and gas exchange  Outcome: Progressing

## 2024-03-09 NOTE — PROGRESS NOTES
4 Eyes Skin Assessment Completed by LAN Desai and LAN Washington.     Head WDL  Ears WDL  Nose WDL  Mouth WDL  Neck WDL  Breast/Chest L axilla Redness, Scab, and Edema, MARISELA drain intact, bruise to right upper chest  Shoulder Blades WDL  Spine WDL  (R) Arm/Elbow/Hand WDL  (L) Arm/Elbow/Hand WDL  Abdomen WDL  Groin WDL  Scrotum/Coccyx/Buttocks WDL  (R) Leg WDL  (L) Leg WDL  (R) Heel/Foot/Toe WDL  (L) Heel/Foot/Toe WDL              Devices In Places ECG, Blood Pressure Cuff, and Pulse Ox, marisela drain      Interventions In Place Pillows, Low Air Loss Mattress, Heels Loaded W/Pillows, and Pressure Redistribution Mattress     Possible Skin Injury Yes     Pictures Uploaded Into Epic Yes  Wound Consult Placed Yes  RN Wound Prevention Protocol Ordered No

## 2024-03-09 NOTE — ASSESSMENT & PLAN NOTE
3/8 ultrasound left arm negative for superficial or deep vein thrombosis  Likely secondary to lymphedema or could be due to decreased venous return flow.  All secondary to large squamous cell mass

## 2024-03-09 NOTE — ASSESSMENT & PLAN NOTE
Secondary to necrotic squamous cell cancer mass  S/p IR drain placement 3/7; replaced and upsized on 3/14  Wound culture positive for  Finegoldia magna, MRSA, Group D Enterococcus.     3/15:  -Discussed with IR - agreeable with discharge home and close outpatient follow up for drain management  -Discussed with ID - agreeable with discharge home and PO anbx. No stop date at this time  -Waiting to hear back from surgery team for recs    -Home health referrals placed   -Plan for DC home tomorrow AM  - Drain with 30mL out overnight; serosanguinous

## 2024-03-09 NOTE — CARE PLAN
Problem: Pain - Standard  Goal: Alleviation of pain or a reduction in pain to the patient’s comfort goal  Outcome: Progressing     Problem: Knowledge Deficit - Standard  Goal: Patient and family/care givers will demonstrate understanding of plan of care, disease process/condition, diagnostic tests and medications  Outcome: Progressing     The patient is Watcher - Medium risk of patient condition declining or worsening    Shift Goals  Clinical Goals: Pt will tolerate antibiotic treatment  Patient Goals: less loose BM, rest  Family Goals: updates    Progress made toward(s) clinical / shift goals:  Pt updated on POC    Patient is not progressing towards the following goals:

## 2024-03-10 PROBLEM — M79.89 LEFT ARM SWELLING: Status: RESOLVED | Noted: 2024-03-08 | Resolved: 2024-03-10

## 2024-03-10 PROBLEM — E27.40 ADRENAL INSUFFICIENCY (HCC): Status: ACTIVE | Noted: 2024-03-10

## 2024-03-10 PROBLEM — Z71.89 ACP (ADVANCE CARE PLANNING): Status: ACTIVE | Noted: 2024-03-10

## 2024-03-10 LAB
ALBUMIN SERPL BCP-MCNC: 3 G/DL (ref 3.2–4.9)
ANION GAP SERPL CALC-SCNC: 15 MMOL/L (ref 7–16)
BACTERIA SPEC ANAEROBE CULT: ABNORMAL
BACTERIA SPEC ANAEROBE CULT: ABNORMAL
BACTERIA WND AEROBE CULT: ABNORMAL
BUN SERPL-MCNC: 71 MG/DL (ref 8–22)
CALCIUM ALBUM COR SERPL-MCNC: 8.8 MG/DL (ref 8.5–10.5)
CALCIUM SERPL-MCNC: 8 MG/DL (ref 8.5–10.5)
CHLORIDE SERPL-SCNC: 98 MMOL/L (ref 96–112)
CO2 SERPL-SCNC: 15 MMOL/L (ref 20–33)
CREAT SERPL-MCNC: 2.78 MG/DL (ref 0.5–1.4)
ERYTHROCYTE [DISTWIDTH] IN BLOOD BY AUTOMATED COUNT: 45.3 FL (ref 35.9–50)
GFR SERPLBLD CREATININE-BSD FMLA CKD-EPI: 24 ML/MIN/1.73 M 2
GLUCOSE BLD STRIP.AUTO-MCNC: 208 MG/DL (ref 65–99)
GLUCOSE BLD STRIP.AUTO-MCNC: 256 MG/DL (ref 65–99)
GLUCOSE BLD STRIP.AUTO-MCNC: 340 MG/DL (ref 65–99)
GLUCOSE SERPL-MCNC: 247 MG/DL (ref 65–99)
GRAM STN SPEC: ABNORMAL
HCT VFR BLD AUTO: 37.4 % (ref 42–52)
HGB BLD-MCNC: 12 G/DL (ref 14–18)
MCH RBC QN AUTO: 28.6 PG (ref 27–33)
MCHC RBC AUTO-ENTMCNC: 32.1 G/DL (ref 32.3–36.5)
MCV RBC AUTO: 89 FL (ref 81.4–97.8)
PHOSPHATE SERPL-MCNC: 3 MG/DL (ref 2.5–4.5)
PLATELET # BLD AUTO: 104 K/UL (ref 164–446)
PMV BLD AUTO: 12.6 FL (ref 9–12.9)
POTASSIUM SERPL-SCNC: 5.2 MMOL/L (ref 3.6–5.5)
RBC # BLD AUTO: 4.2 M/UL (ref 4.7–6.1)
SIGNIFICANT IND 70042: ABNORMAL
SIGNIFICANT IND 70042: ABNORMAL
SITE SITE: ABNORMAL
SITE SITE: ABNORMAL
SODIUM SERPL-SCNC: 128 MMOL/L (ref 135–145)
SOURCE SOURCE: ABNORMAL
SOURCE SOURCE: ABNORMAL
WBC # BLD AUTO: 12 K/UL (ref 4.8–10.8)

## 2024-03-10 PROCEDURE — 700111 HCHG RX REV CODE 636 W/ 250 OVERRIDE (IP): Mod: JZ | Performed by: HOSPITALIST

## 2024-03-10 PROCEDURE — 700111 HCHG RX REV CODE 636 W/ 250 OVERRIDE (IP): Performed by: HOSPITALIST

## 2024-03-10 PROCEDURE — 99223 1ST HOSP IP/OBS HIGH 75: CPT | Performed by: INTERNAL MEDICINE

## 2024-03-10 PROCEDURE — A9270 NON-COVERED ITEM OR SERVICE: HCPCS | Performed by: STUDENT IN AN ORGANIZED HEALTH CARE EDUCATION/TRAINING PROGRAM

## 2024-03-10 PROCEDURE — 700102 HCHG RX REV CODE 250 W/ 637 OVERRIDE(OP): Performed by: INTERNAL MEDICINE

## 2024-03-10 PROCEDURE — 700111 HCHG RX REV CODE 636 W/ 250 OVERRIDE (IP): Performed by: INTERNAL MEDICINE

## 2024-03-10 PROCEDURE — 82962 GLUCOSE BLOOD TEST: CPT | Mod: 91

## 2024-03-10 PROCEDURE — 700111 HCHG RX REV CODE 636 W/ 250 OVERRIDE (IP): Mod: JZ | Performed by: STUDENT IN AN ORGANIZED HEALTH CARE EDUCATION/TRAINING PROGRAM

## 2024-03-10 PROCEDURE — 700102 HCHG RX REV CODE 250 W/ 637 OVERRIDE(OP): Performed by: HOSPITALIST

## 2024-03-10 PROCEDURE — 770004 HCHG ROOM/CARE - ONCOLOGY PRIVATE *

## 2024-03-10 PROCEDURE — 99233 SBSQ HOSP IP/OBS HIGH 50: CPT | Performed by: STUDENT IN AN ORGANIZED HEALTH CARE EDUCATION/TRAINING PROGRAM

## 2024-03-10 PROCEDURE — 80069 RENAL FUNCTION PANEL: CPT

## 2024-03-10 PROCEDURE — 85027 COMPLETE CBC AUTOMATED: CPT

## 2024-03-10 PROCEDURE — 700102 HCHG RX REV CODE 250 W/ 637 OVERRIDE(OP): Performed by: STUDENT IN AN ORGANIZED HEALTH CARE EDUCATION/TRAINING PROGRAM

## 2024-03-10 PROCEDURE — 36415 COLL VENOUS BLD VENIPUNCTURE: CPT

## 2024-03-10 PROCEDURE — 700111 HCHG RX REV CODE 636 W/ 250 OVERRIDE (IP): Mod: JZ | Performed by: INTERNAL MEDICINE

## 2024-03-10 PROCEDURE — A9270 NON-COVERED ITEM OR SERVICE: HCPCS | Performed by: HOSPITALIST

## 2024-03-10 PROCEDURE — 700105 HCHG RX REV CODE 258: Performed by: STUDENT IN AN ORGANIZED HEALTH CARE EDUCATION/TRAINING PROGRAM

## 2024-03-10 PROCEDURE — A9270 NON-COVERED ITEM OR SERVICE: HCPCS | Performed by: INTERNAL MEDICINE

## 2024-03-10 PROCEDURE — 700101 HCHG RX REV CODE 250: Performed by: NURSE PRACTITIONER

## 2024-03-10 RX ORDER — FUROSEMIDE 10 MG/ML
40 INJECTION INTRAMUSCULAR; INTRAVENOUS
Status: DISCONTINUED | OUTPATIENT
Start: 2024-03-10 | End: 2024-03-11

## 2024-03-10 RX ADMIN — HEPARIN SODIUM 5000 UNITS: 5000 INJECTION, SOLUTION INTRAVENOUS; SUBCUTANEOUS at 04:43

## 2024-03-10 RX ADMIN — HEPARIN SODIUM 5000 UNITS: 5000 INJECTION, SOLUTION INTRAVENOUS; SUBCUTANEOUS at 22:12

## 2024-03-10 RX ADMIN — INSULIN HUMAN 10 UNITS: 100 INJECTION, SOLUTION PARENTERAL at 20:42

## 2024-03-10 RX ADMIN — INSULIN HUMAN 7 UNITS: 100 INJECTION, SOLUTION PARENTERAL at 12:48

## 2024-03-10 RX ADMIN — INSULIN HUMAN 4 UNITS: 100 INJECTION, SOLUTION PARENTERAL at 08:22

## 2024-03-10 RX ADMIN — HYDROCORTISONE SODIUM SUCCINATE 50 MG: 100 INJECTION, POWDER, FOR SOLUTION INTRAMUSCULAR; INTRAVENOUS at 04:44

## 2024-03-10 RX ADMIN — AMPICILLIN AND SULBACTAM 3 G: 1; 2 INJECTION, POWDER, FOR SOLUTION INTRAMUSCULAR; INTRAVENOUS at 17:51

## 2024-03-10 RX ADMIN — HYDROCORTISONE SODIUM SUCCINATE 50 MG: 100 INJECTION, POWDER, FOR SOLUTION INTRAMUSCULAR; INTRAVENOUS at 22:12

## 2024-03-10 RX ADMIN — SODIUM HYPOCHLORITE 5 ML: 1.25 SOLUTION TOPICAL at 17:53

## 2024-03-10 RX ADMIN — ATORVASTATIN CALCIUM 80 MG: 80 TABLET, FILM COATED ORAL at 20:40

## 2024-03-10 RX ADMIN — AMPICILLIN AND SULBACTAM 3 G: 1; 2 INJECTION, POWDER, FOR SOLUTION INTRAMUSCULAR; INTRAVENOUS at 12:39

## 2024-03-10 RX ADMIN — LINEZOLID 600 MG: 600 TABLET, FILM COATED ORAL at 17:51

## 2024-03-10 RX ADMIN — GABAPENTIN 600 MG: 300 CAPSULE ORAL at 17:51

## 2024-03-10 RX ADMIN — FUROSEMIDE 40 MG: 10 INJECTION, SOLUTION INTRAVENOUS at 14:23

## 2024-03-10 RX ADMIN — INSULIN HUMAN 7 UNITS: 100 INJECTION, SOLUTION PARENTERAL at 17:54

## 2024-03-10 RX ADMIN — HYDROCORTISONE SODIUM SUCCINATE 50 MG: 100 INJECTION, POWDER, FOR SOLUTION INTRAMUSCULAR; INTRAVENOUS at 14:22

## 2024-03-10 RX ADMIN — INSULIN HUMAN 13 UNITS: 100 INJECTION, SUSPENSION SUBCUTANEOUS at 17:55

## 2024-03-10 RX ADMIN — TACROLIMUS 1 MG: 1 CAPSULE ORAL at 17:50

## 2024-03-10 RX ADMIN — LINEZOLID 600 MG: 600 TABLET, FILM COATED ORAL at 04:43

## 2024-03-10 RX ADMIN — OMEPRAZOLE 20 MG: 20 CAPSULE, DELAYED RELEASE ORAL at 04:44

## 2024-03-10 RX ADMIN — SODIUM CHLORIDE, PRESERVATIVE FREE 10 ML: 5 INJECTION INTRAVENOUS at 06:00

## 2024-03-10 RX ADMIN — MYCOPHENOLATE MOFETIL 500 MG: 250 CAPSULE ORAL at 04:43

## 2024-03-10 RX ADMIN — MYCOPHENOLATE MOFETIL 500 MG: 250 CAPSULE ORAL at 17:50

## 2024-03-10 RX ADMIN — ACETAMINOPHEN 650 MG: 325 TABLET, FILM COATED ORAL at 20:39

## 2024-03-10 RX ADMIN — SODIUM HYPOCHLORITE 50 ML: 1.25 SOLUTION TOPICAL at 04:44

## 2024-03-10 RX ADMIN — SODIUM CHLORIDE, PRESERVATIVE FREE 10 ML: 5 INJECTION INTRAVENOUS at 17:50

## 2024-03-10 RX ADMIN — GABAPENTIN 600 MG: 300 CAPSULE ORAL at 04:43

## 2024-03-10 RX ADMIN — INSULIN HUMAN 10 UNITS: 100 INJECTION, SUSPENSION SUBCUTANEOUS at 08:23

## 2024-03-10 RX ADMIN — HEPARIN SODIUM 5000 UNITS: 5000 INJECTION, SOLUTION INTRAVENOUS; SUBCUTANEOUS at 14:23

## 2024-03-10 RX ADMIN — TACROLIMUS 1 MG: 1 CAPSULE ORAL at 04:43

## 2024-03-10 ASSESSMENT — ENCOUNTER SYMPTOMS
SPEECH CHANGE: 0
HEADACHES: 0
NAUSEA: 0
SHORTNESS OF BREATH: 0
MYALGIAS: 0
CHILLS: 0
NERVOUS/ANXIOUS: 1
COUGH: 0
NECK PAIN: 0
NERVOUS/ANXIOUS: 0
HEARTBURN: 0
ABDOMINAL PAIN: 0
PALPITATIONS: 0
DIZZINESS: 0
WEAKNESS: 0
FEVER: 0
TREMORS: 0
SPUTUM PRODUCTION: 0
VOMITING: 0
SORE THROAT: 0
DIARRHEA: 1
DEPRESSION: 0
ORTHOPNEA: 0

## 2024-03-10 ASSESSMENT — PAIN DESCRIPTION - PAIN TYPE
TYPE: ACUTE PAIN
TYPE: ACUTE PAIN

## 2024-03-10 NOTE — PROGRESS NOTES
Hospital Medicine Daily Progress Note    Date of Service  3/10/2024    Chief Complaint  Left arm swelling and discomfort.    Hospital Course  Jama Altman is a 68 y.o. male w/ a hx of afib recent watchman procedure,  CAD, renal transplant 2010 (hx of polycystic kidney disease), HLD, HTN and PAD.  He was admitted 3/6/2024 with weakness.  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  He had IR place a drain in left axilla 3/7.  A CT scan showed a large left axillary mass with necrosis.  He was hypotensive and received boluses and admitted with septic shock.    S/p IR drain placement 3/7    Interval Problem Update  Patient seen and examined at bedside  Reported the pain is better    Drain in place - output 85cc/24h    Wound culture positive for  Finegoldia magna, MRSA, Group D Enterococcus. I discussed with ID, added unasyn, continue zyvox.   Plan to repeat ct 3/12  Plan for antibiotics course 10-14 days if not complicated    Hyponatremia Na 128    Worsening SANKET - hx of renal transplantation. Continue iv lasix. Nephro following    Wbc 21>12  Plt 121>81>104  Hb 12  Cre 1.96>2.55>2.78    I have discussed this patient's plan of care and discharge plan at IDT rounds today with Case Management, Nursing, Nursing leadership, and other members of the IDT team.    Consultants/Specialty  oncology    Code Status  Full Code    Disposition  The patient is not medically cleared for discharge to home or a post-acute facility.      I have placed the appropriate orders for post-discharge needs.    Review of Systems  Review of Systems   Constitutional:  Negative for malaise/fatigue.   HENT:  Negative for congestion.    Respiratory:  Negative for shortness of breath.    Cardiovascular:  Negative for chest pain and leg swelling.   Gastrointestinal:  Negative for nausea.   Musculoskeletal:  Negative for myalgias and neck pain.        Swelling of left arm.   Neurological:  Negative  for tremors, speech change and headaches.   Psychiatric/Behavioral:  The patient is not nervous/anxious.         Physical Exam  Temp:  [36.2 °C (97.1 °F)-36.4 °C (97.6 °F)] 36.4 °C (97.6 °F)  Pulse:  [] 67  Resp:  [16-18] 18  BP: (101-113)/(65-77) 104/68  SpO2:  [93 %-96 %] 93 %    Physical Exam  Vitals reviewed.   Constitutional:       Appearance: Normal appearance. He is not diaphoretic.   HENT:      Head: Normocephalic and atraumatic.      Nose: Nose normal.   Eyes:      General: No scleral icterus.        Right eye: No discharge.         Left eye: No discharge.      Extraocular Movements: Extraocular movements intact.      Conjunctiva/sclera: Conjunctivae normal.   Cardiovascular:      Rate and Rhythm: Normal rate. Rhythm irregular.      Pulses:           Radial pulses are 2+ on the right side and 2+ on the left side.        Dorsalis pedis pulses are 2+ on the right side and 2+ on the left side.      Heart sounds: No murmur heard.  Pulmonary:      Effort: Pulmonary effort is normal. No respiratory distress.      Breath sounds: Normal breath sounds. No wheezing or rales.   Abdominal:      General: Bowel sounds are normal. There is no distension.      Palpations: Abdomen is soft.      Tenderness: There is no abdominal tenderness.   Musculoskeletal:         General: No swelling or tenderness.      Cervical back: Neck supple. No muscular tenderness.      Right lower leg: No edema.      Left lower leg: No edema.      Comments: Swelling of left arm.   Skin:     Coloration: Skin is not jaundiced or pale.      Comments: Dressing over left axillary. Wound pictures in media tab evaluated 3/8   Neurological:      General: No focal deficit present.      Mental Status: He is alert and oriented to person, place, and time. Mental status is at baseline.      Cranial Nerves: No cranial nerve deficit.   Psychiatric:         Mood and Affect: Mood normal.         Behavior: Behavior normal.         Fluids    Intake/Output  Summary (Last 24 hours) at 3/10/2024 1436  Last data filed at 3/10/2024 0800  Gross per 24 hour   Intake 1600 ml   Output 1065 ml   Net 535 ml       Laboratory  Recent Labs     03/08/24  0230 03/09/24  0300 03/10/24  0022   WBC 13.9* 13.8* 12.0*   RBC 4.22* 4.18* 4.20*   HEMOGLOBIN 12.1* 11.8* 12.0*   HEMATOCRIT 38.0* 37.8* 37.4*   MCV 90.0 90.4 89.0   MCH 28.7 28.2 28.6   MCHC 31.8* 31.2* 32.1*   RDW 47.5 46.7 45.3   PLATELETCT 72* 81* 104*   MPV 12.5 12.6 12.6     Recent Labs     03/08/24 0230 03/09/24  0300 03/10/24  0022   SODIUM 133* 128* 128*   POTASSIUM 5.4 5.4 5.2   CHLORIDE 103 99 98   CO2 19* 18* 15*   GLUCOSE 359* 317* 247*   BUN 54* 63* 71*   CREATININE 2.67* 2.55* 2.78*   CALCIUM 7.9* 7.9* 8.0*                     Imaging  US-EXTREMITY VENOUS UPPER UNILAT LEFT   Final Result      CT-DRAIN-SKIN - SUBCUTANEOUS   Final Result      1. Ultrasound GUIDED PLACEMENT OF A PERCUTANEOUS DRAINAGE CATHETER IN A LEFT AXILLARY FLUID COLLECTION.      2.  THE CURRENT PLAN IS TO MONITOR DRAINAGE OUTPUT AND OBTAIN A FOLLOWUP CT SCAN IN 5-7 DAYS IF CLINICALLY INDICATED.      US-RENAL   Final Result      1.  Normal appearance of the right lower quadrant transplant kidney.   2.  Native left kidney demonstrates no hydronephrosis. There are innumerable cysts with very little normal-appearing parenchyma.   3.  Rounded echogenic masslike area in the inferior bladder. It is uncertain if this is related to an enlarged prostate gland protruding into the bladder base or a bladder mass. Recommend clinical correlation with history of hematuria.      CT-CHEST (THORAX) W/O   Final Result      1.  Partially visualized large left axillary mass and necrotic adenopathy is consistent with known squamous cell carcinoma. Central hypodensity in the larger mass may be related to necrosis.      2.  0.9 cm pleural-based nodule in the right lower lobe is nonspecific, possibly present on the prior exam where there were patchy opacities. Follow-up  per oncology protocol      3.  Patchy groundglass density and subsegmental atelectasis appears slightly improved from the prior CT. Findings are consistent with a chronic inflammatory/infectious process.      Fleischner Society pulmonary nodule recommendations:   Not Applicable              Assessment/Plan  * Abscess of axilla, left  Assessment & Plan  Secondary to necrotic squamous cell cancer mass  S/p IR drain placement 3/7    Wound culture positive for  Finegoldia magna, MRSA, Group D Enterococcus.   I discussed with ID, added unasyn, continue zyvox.   Plan to repeat ct 3/12  Plan for antibiotics course 10-14 days if not complicated  I ordered am CBC, CMP to monitor        Acute hypoxic respiratory failure (HCC)- (present on admission)  Assessment & Plan  No baseline O2, now requiring 4L  CT chest without showing partially visualized large left axillary mass and necrotic adenopathy consistent with known SCC.  Patchy groundglass density and subsegmental atelectasis, consistent with chronic inflammatory/infectious process  Secondary to sepsis vs chronic inflammatory/infectious process  Supplemental o2 for O2 saturation >92% titrate oxygen as able  Empiric treatment with Cefepime/Linezolid  Management as above for sepsis    Adrenal insufficiency (HCC)  Assessment & Plan  Due to chronic steroid use  On hydrocotisone.    Squamous cell carcinoma of skin  Assessment & Plan  Of left axilla per biopsy on 2/23/24    I discussed with oncology, plan outpatient PET scan, immunotherapy and radiation therapy    Peripheral neuropathy  Assessment & Plan  Continue home Gabapentin 300 mg BID    Septic shock (HCC)- (present on admission)  Assessment & Plan  Sepsis protocols initiated   Linezolid 600 mg IV BID  Follow blood cultures   Prelim wound culture 3/8 appears MRSA  Maintenance IVF with  mL bolus if MAP <65 or SBP <90  Monitor UOP, goal of 0.5 ml/kg/hr  Wean solu cortef as able.  Off pressors 3/7am.  General surgery  consulted in the ED, recommend IR drainage  MRSA wound and continue linezolid    Presence of Watchman left atrial appendage closure device- (present on admission)  Assessment & Plan  Afib, s/p watchman procedure on 1/9/24 off anticoagulation  Rate controlled    Abdominal aortic aneurysm (AAA) without rupture (HCC)- (present on admission)  Assessment & Plan  History of, 3.2 cm on 1/27/23  Monitor blood pressure  Continue to monitor    Immunosuppressed status (HCC)- (present on admission)  Assessment & Plan  Await ordered 3/8 tacrolimus and mycophenolate levels  Both levels should be between 3 and 6  Per [facility] transplant team Dr. Carly Becerril ([phone]) okay to continue tacrolimus for now but hold mycophenolate    AF (atrial fibrillation) (HCC)- (present on admission)  Assessment & Plan  History of, in SR, s/p watchman procedure on 1/2024, off anticoagulation  Metoprolol on hold due to low bp and adrenal insufficiency   Monitor HR    Type 2 diabetes mellitus (HCC)- (present on admission)  Assessment & Plan  A1C (2/16/24): 6.9%    3/10 high BG, on steroids  I increased lantus to 13 units  Continue SSI  Close monitor hypoglycemia signs, treatment as needed    GERD (gastroesophageal reflux disease)- (present on admission)  Assessment & Plan  Controlled  Continue Omeprazole 20 mg daily    Thrombocytopenia (HCC)- (present on admission)  Assessment & Plan  3/10 Plt 121>81>104  Likely due to sepsis   I ordered am cbc to monitor  Monitor bleeding signs    Acute kidney injury superimposed on chronic kidney disease (HCC)- (present on admission)  Assessment & Plan  BUNs/CR 44/2.60 (baseline Cr ~2.0)  Possibly prerenal from sepsis  Having to hold mycophenolate   Restarted tacrolimus 3/9  U/S renal did not show any hydronephrosis nor any signs of active rejection  Acute worsening concerning for recent hypotension.      3/10 Worsening SANKET , Cre 1.96>2.55>2.78  Continue iv lasix  I reviewed nephro notes    Coronary artery  disease- (present on admission)  Assessment & Plan  S/p MI with 2 stents in 2011  Hold aspirin 81 mg daily for possible surgical intervention  Continue Atorvastatin 80 mg daily  Metoprolol Succinate 25 mcg daily    Hyperlipidemia- (present on admission)  Assessment & Plan  Atorvastatin 80 mg for ongoing active treatment    Primary hypertension- (present on admission)  Assessment & Plan  Now adrenal insufficiency  Bp low, metoprolol on hold    History of renal transplant- (present on admission)  Assessment & Plan  2/2 polycystic kidney disease s/p right renal transplant in 2010  Holding home mycophenolate 500 mg twice daily and home tacrolimus 1 mg twice daily  Restart tacrolimus 3/9  CHecking tacrolimus and mycophenolate levels which per transplant team should be between 3 and 6  I consulted and discussed with Dr Alvares, [facility].      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 I discussed advance care planning with the patient at bedside, including diagnosis, prognosis, plan of care, risks and benefits of any therapies that could be offered.   We discussed regarding CODE STATUS, CPR and intubation with mechanical ventilation, discussed regarding risk and benefits. The patient is willing to  have all life sustaining efforts. Continue full code.  ACP: 16min         VTE prophylaxis: heparin    High complexity  I spent greater than 52 minutes for chart review, obtaining history independently, performing medically appropriate examination,  documenting , ordering medications, tests, or procedures, referring and communicating with other health care professionals, Independently interpreting results and communicating results with patient/family/caregiver. More than 50% of time was spent in face-to-face clinical encounter.

## 2024-03-10 NOTE — PROGRESS NOTES
Radiology Progress Note   Author: LARRY Potter   Date & Time created: 3/10/2024  9:13 AM   Date of admission  3/6/2024  Note to reader: this note follows the APSO format rather than the historical SOAP format. Assessment and plan located at the top of the note for ease of use.    Chief Complaint  68 y.o. male admitted 3/6/2024 with   Chief Complaint   Patient presents with    Flu Like Symptoms     C/o flu like symptoms since yesterday. + body aches and chills. + lightheadedness.     Wound Check     Also c/o left wound possible infection. 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  He was admitted 03/06/2024 for CT finding of left axillary fluid collection after presenting to the ED for worsening axillary pain.  Patient underwent a left axillary abscess drain placement with IR Dr. Gutierrez on 03/07/2024.    Interval History:   03/08/24 - 12 Fr left axillary abscess drain to Left axilla with 455 mL turbid serosanguineous output in the last 24 hours. WBC 13.9. Cultures pending. On ABX. Drain flushed with 10 mL NS.     03/09/24 - 12 Fr left axillary abscess drain to Left axilla with 35 mL of serosanguineous output in the last 24 hours.  I flushed drain with 10 mL normal saline solution.  Order placed to have RNs irrigate wound with 10 mL NSS 2 times daily; I I reviewed today's labs: WBC 13.8; Hgb 11.8, Cr 2.55; Micro from axillary fluid positive for MRSA. I  Afebrile.    03/10/24 - 12 Fr left axillary abscess drain to Left axilla with 85 mL of serosanguineous output in the last 24 hours.  I flushed drain with 10 mL normal saline solution.  Order placed to have RNs irrigate wound with 10 mL NSS 2 times daily; I I reviewed today's labs: WBC 12.0; Hgb  12.0, Cr 2.78; FSBS 208; micro from axillary fluid positive for Finegoldia magna, MRSA, enterococcus Faecalis. Afebrile.     Assessment/Plan     Principal Problem:    Septic shock (HCC)  Active Problems:    History of renal transplant    Primary hypertension    Hyperlipidemia    Coronary artery disease    Acute kidney injury superimposed on chronic kidney disease (HCC)    Thrombocytopenia (HCC)    GERD (gastroesophageal reflux disease)    Type 2 diabetes mellitus (HCC)    AF (atrial fibrillation) (HCC)    Acute hypoxic respiratory failure (HCC)    Immunosuppressed status (HCC)    Abdominal aortic aneurysm (AAA) without rupture (HCC)    Presence of Watchman left atrial appendage closure device    Peripheral neuropathy    Squamous cell carcinoma of skin    Abscess of axilla, left    Severe sepsis (HCC)    Left arm swelling    Axillary mass, left      Plan IR  -Continue orders for RNs to irrigate Left axillary drain with 10 ml of sterile saline each shift  -ABX per hospitalist/ID  - general surgery following   - Recommend follow up CT scan in 5-7 days when output has decreased to approximately 5-10 cc (03/12/24)  - Continue to monitor drains, VS, and labs      -Thank you for allowing Interventional Radiology team to participate in the patients care, if any additional care or requests are needed in the future please do not hesitate to call or place IR order   [phone]           Review of Systems  Physical Exam   Review of Systems   Constitutional:  Negative for chills and fever.   Respiratory:  Negative for sputum production and shortness of breath.    Cardiovascular:  Negative for chest pain and palpitations.   Gastrointestinal:  Positive for diarrhea. Negative for abdominal pain, nausea and vomiting.   Musculoskeletal:         Left axillary pain   Neurological:  Negative for weakness and headaches.      Vitals:    03/10/24 0829   BP: 104/68   Pulse: 67   Resp: 18   Temp: 36.4 °C (97.6 °F)   SpO2: 93%        Physical  Exam  Vitals and nursing note reviewed.   Constitutional:       Appearance: Normal appearance.   HENT:      Head: Normocephalic and atraumatic.   Cardiovascular:      Rate and Rhythm: Normal rate.      Pulses: Normal pulses.   Pulmonary:      Effort: Pulmonary effort is normal. No respiratory distress.   Abdominal:      Palpations: Abdomen is soft.   Musculoskeletal:         General: Tenderness (Left axilla) present.      Comments: IR drain to left axilla-draining serosanguineous fluid   Skin:     General: Skin is warm and dry.      Capillary Refill: Capillary refill takes less than 2 seconds.   Neurological:      General: No focal deficit present.      Mental Status: He is alert. Mental status is at baseline.      GCS: GCS eye subscore is 4. GCS verbal subscore is 5. GCS motor subscore is 6.   Psychiatric:         Attention and Perception: Attention normal.         Mood and Affect: Mood normal.         Behavior: Behavior normal.             Labs    Recent Labs     03/08/24  0230 03/09/24  0300 03/10/24  0022   WBC 13.9* 13.8* 12.0*   RBC 4.22* 4.18* 4.20*   HEMOGLOBIN 12.1* 11.8* 12.0*   HEMATOCRIT 38.0* 37.8* 37.4*   MCV 90.0 90.4 89.0   MCH 28.7 28.2 28.6   MCHC 31.8* 31.2* 32.1*   RDW 47.5 46.7 45.3   PLATELETCT 72* 81* 104*   MPV 12.5 12.6 12.6     Recent Labs     03/08/24  0230 03/09/24  0300 03/10/24  0022   SODIUM 133* 128* 128*   POTASSIUM 5.4 5.4 5.2   CHLORIDE 103 99 98   CO2 19* 18* 15*   GLUCOSE 359* 317* 247*   BUN 54* 63* 71*   CREATININE 2.67* 2.55* 2.78*   CALCIUM 7.9* 7.9* 8.0*     Recent Labs     03/08/24  0230 03/09/24  0300 03/10/24  0022   ALBUMIN 2.8*  --  3.0*   TBILIRUBIN 0.7  --   --    ALKPHOSPHAT 61  --   --    TOTPROTEIN 5.1*  --   --    ALTSGPT 19  --   --    ASTSGOT 27  --   --    CREATININE 2.67* 2.55* 2.78*     US-EXTREMITY VENOUS UPPER UNILAT LEFT   Final Result      CT-DRAIN-SKIN - SUBCUTANEOUS   Final Result      1. Ultrasound GUIDED PLACEMENT OF A PERCUTANEOUS DRAINAGE CATHETER  IN A LEFT AXILLARY FLUID COLLECTION.      2.  THE CURRENT PLAN IS TO MONITOR DRAINAGE OUTPUT AND OBTAIN A FOLLOWUP CT SCAN IN 5-7 DAYS IF CLINICALLY INDICATED.      US-RENAL   Final Result      1.  Normal appearance of the right lower quadrant transplant kidney.   2.  Native left kidney demonstrates no hydronephrosis. There are innumerable cysts with very little normal-appearing parenchyma.   3.  Rounded echogenic masslike area in the inferior bladder. It is uncertain if this is related to an enlarged prostate gland protruding into the bladder base or a bladder mass. Recommend clinical correlation with history of hematuria.      CT-CHEST (THORAX) W/O   Final Result      1.  Partially visualized large left axillary mass and necrotic adenopathy is consistent with known squamous cell carcinoma. Central hypodensity in the larger mass may be related to necrosis.      2.  0.9 cm pleural-based nodule in the right lower lobe is nonspecific, possibly present on the prior exam where there were patchy opacities. Follow-up per oncology protocol      3.  Patchy groundglass density and subsegmental atelectasis appears slightly improved from the prior CT. Findings are consistent with a chronic inflammatory/infectious process.      Fleischner Society pulmonary nodule recommendations:   Not Applicable           INR   Date Value Ref Range Status   03/06/2024 1.34 (H) 0.87 - 1.13 Final     Comment:     INR - Non-therapeutic Reference Range: 0.87-1.13  INR - Therapeutic Reference Range: 2.0-4.0       POC INR   Date Value Ref Range Status   08/03/2022 2.1 (H) 0.9 - 1.2 Final     Comment:     INR - Non-therapeutic Reference Range: 0.9-1.2  INR - Therapeutic Reference Range: 2.0-4.0          Intake/Output Summary (Last 24 hours) at 3/8/2024 0833  Last data filed at 3/8/2024 0655  Gross per 24 hour   Intake 100 ml   Output 1025 ml   Net -925 ml      Labs not explicitly included in this progress note were reviewed by the author.  Radiology/imaging not explicitly included in this progress note was reviewed by the author.     I have performed a physical exam and reviewed and updated ROS and Plan today (3/10/2024).     40 minutes in directly providing and coordinating care and extensive data review.  No time overlap and excludes procedures.

## 2024-03-10 NOTE — CONSULTS
INFECTIOUS DISEASES INPATIENT CONSULT NOTE     Date of Service:3/10/24    Consult Requested By: Suzi Gandara M.D.    Reason for Consultation: renal transplant  Skin cancer  Wound infection    History of Present Illness:   Jama Altman is a 68 y.o. male with PCKD and DM s/p renal transplant 2010 admitted 3/6/2024 for nonhealing wound left axilla.  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 Denies vomiting, abdominal pain, diarrhea, constipation, chest pain, dysuria, LE swelling. Denies any family history of cancers. Not established with oncology, was suppose to see one on Friday.      Denies any smoking, alcohol use, nor recreational drug use. Lives alone with son nearby for social support.     In the ED, afebrile, but tachycardic 100-110s, tachypnic 20s, hypotensive SBP 80s, 98% on 4 LNC. +leukocytosis WBC 21.2, .  creat 2.60.  Viral respiratory panel negative.    CT chest + partially visualized large left axillary mass and necrotic adenopathy consistent with known SCC.  Patchy groundglass density and subsegmental atelectasis, consistent with chronic inflammatory/infectious process.Given ceftriaxone  Surgery consulted-recommended IR drainage. Initially in ICU. Oncology consulted 3/8  Nephrology consulted 3/9..  Drain placed 3/7-cultures of fluid polymicrobial  Currently on Zyvox  Infectious Diseases consulted for antibiotic recommendation and management      Review Of Systems:  Decreased pain since drain flu-like sxs resolved  All other systems are reviewed and are negative     PMH:   Past Medical History:   Diagnosis Date    A-fib (HCC)     Arrhythmia     Benign essential hypertension     Diabetes (HCC)      type 2    Heart burn     Hemorrhagic disorder (HCC)     Eliquis    Hyperlipoproteinemia     Hypertension     not on meds anymore    Indigestion     Myocardial infarct (HCC) 2013    stent    Polycystic kidney 09/10/2010    RIGHT KIDNEY TRANSPLANT    Presence of Watchman left atrial appendage closure device 02/29/2024    Sleep apnea 01/30/2024    states he was told he had sleep apnea but has not had a sleep study    Snoring    ESBL sepsis 2022      PSH:  Past Surgical History:   Procedure Laterality Date    STENT PLACEMENT  2013    cardiac    KNEE MANIPULATION  02/16/2012    Performed by LATOYA CONNER at SURGERY [facility]    KNEE UNICOMPARTMENTAL  12/23/2011    Performed by LATOYA CONNER at SURGERY [facility]    KNEE ARTHROSCOPY  12/23/2011    Performed by LATOYA CONNER at SURGERY [facility]    KNEE ARTHROSCOPY  05/03/2011    Performed by HANANE GOLDMAN at SURGERY SAME DAY [facility]    MENISCECTOMY, KNEE, MEDIAL  05/03/2011    Performed by HANANE GOLDMAN at SURGERY SAME DAY [facility]    OTHER  09/10/2010    RIGHT KIDNEY TRANSPLANT    KNEE ARTHROPLASTY TOTAL  01/12/2007    RIGHT    KNEE ARTHROSCOPY  04/10/2006    RIGHT    OTHER ORTHOPEDIC SURGERY  07/08/1974    LEFT KNEE DEBRIDEMENT       FAMILY HX:  History reviewed. No pertinent family history.    SOCIAL HX:  Social History     Socioeconomic History    Marital status: Single     Spouse name: Not on file    Number of children: Not on file    Years of education: Not on file    Highest education level: Not on file   Occupational History    Not on file   Tobacco Use    Smoking status: Never     Passive exposure: Never    Smokeless tobacco: Never   Vaping Use    Vaping Use: Never used   Substance and Sexual Activity    Alcohol use: No    Drug use: No    Sexual activity: Yes     Partners: Female   Other Topics Concern    Not on file   Social History Narrative    Not on file     Social Determinants of Health     Financial Resource Strain: Not  on file   Food Insecurity: Not on file   Transportation Needs: Not on file   Physical Activity: Not on file   Stress: Not on file   Social Connections: Not on file   Intimate Partner Violence: Not on file   Housing Stability: Not on file     Social History     Tobacco Use   Smoking Status Never    Passive exposure: Never   Smokeless Tobacco Never     Social History     Substance and Sexual Activity   Alcohol Use No       Allergies/Intolerances:  Allergies   Allergen Reactions    Doxycycline Rash     Sweats and shakes: 9/28/17: Clarified allergy with patient. Allergy was in 1998 and he doesn't remember what happened. He thought the medication is for pain.  Tolerates doxycycline 9/2017         Other Current Medications:    Current Facility-Administered Medications:     ampicillin/sulbactam (Unasyn) 3 g in  mL IVPB, 3 g, Intravenous, Q6HRS, Tania Briggs M.D.    hydrocortisone sodium succinate PF (Solu-CORTEF) 100 MG injection 50 mg, 50 mg, Intravenous, Q8HRS, Jama Cat D.O., 50 mg at 03/10/24 0444    insulin NPH (HumuLIN N,NovoLIN N) injection, 10 Units, Subcutaneous, BID INSULIN, Jama Cat D.O., 10 Units at 03/10/24 0823    heparin injection 5,000 Units, 5,000 Units, Subcutaneous, Q8HRS, Jama Cat D.O., 5,000 Units at 03/10/24 0443    normal saline flush 0.9 % SOLN 10 mL, 10 mL, Tube, BID, Nayana Nagy A.P.R.N., 10 mL at 03/10/24 0600    insulin regular (HumuLIN R,NovoLIN R) injection, 3-14 Units, Subcutaneous, 4X/DAY ACHS, 4 Units at 03/10/24 0822 **AND** POC blood glucose manual result, , , Q AC AND BEDTIME(S) **AND** NOTIFY MD and PharmD, , , Once **AND** Administer 20 grams of glucose (approximately 8 ounces of fruit juice) every 15 minutes PRN FSBG less than 70 mg/dL, , , PRN **AND** dextrose 50% (D50W) injection 25 g, 25 g, Intravenous, Q15 MIN PRN, Jama Cat D.O.    linezolid (Zyvox) tablet 600 mg, 600 mg, Oral, Q12HRS, Tania Briggs M.D., 600 mg at 03/10/24  "3    atorvastatin (Lipitor) tablet 80 mg, 80 mg, Oral, QHS, Abel English D.O., 80 mg at 24    gabapentin (Neurontin) capsule 600 mg, 600 mg, Oral, BID, Abel English D.O., 600 mg at 03/10/24 0443    [Held by provider] metoprolol SR (Toprol XL) tablet 25 mg, 25 mg, Oral, DAILY, Abel English D.O.    mycophenolate (Cellcept) capsule 500 mg, 500 mg, Oral, Q12HRS, Live Hirsch M.D., 500 mg at 03/10/24 0443    omeprazole (PriLOSEC) capsule 20 mg, 20 mg, Oral, DAILY, Abel English D.O., 20 mg at 03/10/24 0444    tacrolimus (Prograf) capsule 1 mg, 1 mg, Oral, BID, Jama Cat D.O., 1 mg at 03/10/24 0443    acetaminophen (Tylenol) tablet 650 mg, 650 mg, Oral, Q6HRS PRN, Abel English D.O., 650 mg at 24    senna-docusate (Pericolace Or Senokot S) 8.6-50 MG per tablet 2 Tablet, 2 Tablet, Oral, Q EVENING **AND** polyethylene glycol/lytes (Miralax) Packet 1 Packet, 1 Packet, Oral, QDAY PRN, Abel English D.O.    ondansetron (Zofran) syringe/vial injection 4 mg, 4 mg, Intravenous, Q4HRS PRN, Abel English D.O., 4 mg at 24 1232    ondansetron (Zofran ODT) dispertab 4 mg, 4 mg, Oral, Q4HRS PRN, Abel English D.O.    dakins 0.125% (1/4 strength) topical soln, , Topical, BID, Pily Zhou M.D., 50 mL at 03/10/24 0444      Most Recent Vital Signs:  /68   Pulse 67   Temp 36.4 °C (97.6 °F) (Temporal)   Resp 18   Ht 1.956 m (6' 5.01\")   Wt 115 kg (253 lb 8.5 oz)   SpO2 93%   BMI 30.06 kg/m²   Temp  Av.1 °C (96.9 °F)  Min: 35.6 °C (96.1 °F)  Max: 36.7 °C (98.1 °F)    Physical Exam:  General: well-appearing, well nourished no acute distress  HEENT: NCAT, PERRLA, sclera anicteric,  no oral lesions Dentition good  Neck: supple, no JVD  Chest: CTAB, unlabored.  Cardiac:IRR  Abdomen: + bowel sounds, soft, non-tender, non-distended  Extremities: No cyanosis, clubbing.+edema LUE, 2+ pulses  Skin: mild erythema left chest drain axilla thick serosanguinous   Neuro: Alert and oriented times 3, " Speech fluent CN intact HORN  Psych: Mood normal Affect normal    Pertinent Lab Results:  Recent Labs     03/08/24  0230 03/09/24  0300 03/10/24  0022   WBC 13.9* 13.8* 12.0*      Recent Labs     03/08/24  0230 03/09/24  0300 03/10/24  0022   HEMOGLOBIN 12.1* 11.8* 12.0*   HEMATOCRIT 38.0* 37.8* 37.4*   MCV 90.0 90.4 89.0   MCH 28.7 28.2 28.6   PLATELETCT 72* 81* 104*         Recent Labs     03/08/24  0230 03/09/24  0300 03/10/24  0022   SODIUM 133* 128* 128*   POTASSIUM 5.4 5.4 5.2   CHLORIDE 103 99 98   CO2 19* 18* 15*   CREATININE 2.67* 2.55* 2.78*        Recent Labs     03/08/24  0230 03/10/24  0022   ALBUMIN 2.8* 3.0*        Pertinent Micro:  Results       Procedure Component Value Units Date/Time    Anaerobic Culture [[id number]]  (Abnormal) Collected: 03/07/24 1206    Order Status: Completed Specimen: Wound Updated: 03/10/24 0846     Significant Indicator POS     Source WND     Site L Axillary Fluid     Culture Result -      Finegoldia magna  Heavy growth      Narrative:      Left axillary fluid aspirate  Left axillary fluid aspirate    CULTURE WOUND W/ GRAM STAIN [[id number]]  (Abnormal)  (Susceptibility) Collected: 03/07/24 1206    Order Status: Completed Specimen: Wound Updated: 03/10/24 0846     Significant Indicator POS     Source WND     Site L Axillary Fluid     Culture Result -     Gram Stain Result Rare WBCs.  Moderate Gram positive cocci.       Culture Result Methicillin Resistant Staphylococcus aureus  Heavy growth  This isolate is presumed to be clindamycin resistant based on  detection of inducible resistance.        Enterococcus faecalis  Heavy growth      Narrative:      Left axillary fluid aspirate  Left axillary fluid aspirate    Susceptibility       Methicillin resistant staphylococcus aureus (1)       Antibiotic Interpretation Microscan   Method Status    Clindamycin Resistant 0.5 mcg/mL CESIA Final    Cefazolin Resistant <=8 mcg/mL CESIA Final    Cefepime Resistant 8 mcg/mL CESIA Final     Daptomycin Sensitive <=0.5 mcg/mL CESIA Final    Erythromycin Resistant >4 mcg/mL CESIA Final    Ampicillin/sulbactam Resistant <=8/4 mcg/mL CESIA Final    Linezolid Sensitive 2 mcg/mL CESIA Final    Oxacillin Resistant >2 mcg/mL CESIA Final    Trimeth/Sulfa Sensitive <=0.5/9.5 mcg/mL CESIA Final    Tetracycline Resistant >8 mcg/mL CESIA Final    Vancomycin Sensitive 1 mcg/mL CESIA Final              Enterococcus faecalis (2)       Antibiotic Interpretation Microscan   Method Status    Ampicillin Sensitive <=2 mcg/mL CESIA Final    Daptomycin Sensitive 2 mcg/mL CESIA Final    Linezolid Sensitive 2 mcg/mL CESIA Final    Tetracycline Resistant >8 mcg/mL CESIA Final    Vancomycin Sensitive 1 mcg/mL CESIA Final    Gent Synergy Sensitive <=500 mcg/mL CESIA Final    Penicillin Sensitive 2 mcg/mL CESIA Final                       Urine Culture (New) [[id number]] Collected: 03/07/24 1733    Order Status: Completed Specimen: Urine Updated: 03/09/24 0833     Significant Indicator NEG     Source UR     Site -     Culture Result No growth at 48 hours.    Narrative:      Indication for culture:->Emergency Room Patient  Indication for culture:->Emergency Room Patient    GRAM STAIN [[id number]] Collected: 03/07/24 1206    Order Status: Completed Specimen: Wound Updated: 03/07/24 2031     Significant Indicator .     Source WND     Site L Axillary Fluid     Gram Stain Result Rare WBCs.  Moderate Gram positive cocci.      Narrative:      Left axillary fluid aspirate  Left axillary fluid aspirate    Urinalysis [[id number]]  (Abnormal) Collected: 03/07/24 1733    Order Status: Completed Specimen: Urine Updated: 03/07/24 1755     Color Yellow     Character Clear     Specific Gravity 1.017     Ph 5.0     Glucose Negative mg/dL      Ketones Trace mg/dL      Protein Negative mg/dL      Bilirubin Negative     Urobilinogen, Urine 0.2     Nitrite Negative     Leukocyte Esterase Negative     Occult Blood Negative     Micro Urine Req see below     Comment: Microscopic  examination not performed when specimen is clear  and chemically negative for protein, blood, leukocyte esterase  and nitrite.         Narrative:      If not done within the last 24 hours    FLUID CULTURE W/GRAM STAIN [[id number]] Collected: 03/07/24 1206    Order Status: Canceled Specimen: Other Body Fluid     Aerobic/Anaerobic Culture (Surgery) [[id number]] Collected: 03/07/24 1206    Order Status: Canceled Specimen: Other Body Fluid     MRSA By PCR (Amp) [[id number]] Collected: 03/07/24 0215    Order Status: Completed Specimen: Respirate from Nares Updated: 03/07/24 1140     MRSA by PCR POSITIVE    Narrative:      If not done within the last 24 hours    Blood Culture - Draw one from central line and one from peripheral site [[id number]] Collected: 03/06/24 2025    Order Status: Completed Specimen: Blood from Peripheral Updated: 03/07/24 0752     Significant Indicator NEG     Source BLD     Site PERIPHERAL     Culture Result No Growth  Note: Blood cultures are incubated for 5 days and  are monitored continuously.Positive blood cultures  are called to the RN and reported as soon as  they are identified.      Narrative:      Blood Cultures X2. Draw one blood culture from central line  and one blood culture peripherally. If no line present, draw  blood cultures times two peripherally from different sites.  No site indicated    Blood Culture - Draw one from central line and one from peripheral site [[id number]] Collected: 03/06/24 2227    Order Status: Completed Specimen: Blood from Line Updated: 03/07/24 0752     Significant Indicator NEG     Source BLD     Site Peripheral     Culture Result No Growth  Note: Blood cultures are incubated for 5 days and  are monitored continuously.Positive blood cultures  are called to the RN and reported as soon as  they are identified.      Narrative:      Blood Cultures X2. Blood Cultures X2. Draw one blood culture  from central line and one blood culture peripherally. If no  line  present, draw blood cultures times two peripherally from  different sites.  No site indicated          Blood Culture   Date Value Ref Range Status   09/27/2017 No growth after 5 days of incubation.  Final     Blood Culture Hold   Date Value Ref Range Status   09/27/2017 Collected  Final        Studies:    IMPRESSION:   SCCA of the left chest/axilla  Wound infection with abscess after biopsy  -polymicrobial MRSA, Efaecalis, Fingoldia  -s/p drain 3/7  SANKET  S/p renal transplant-Nephrology eval appreciated  Thrombocytopenia     PLAN:   Agree with Zyvox for MRSA  Add Unasyn for for now for better treatment Efaecalis and to treat Fingoldia  Adjust abx for renal insuff  Monitor CBC on Zyvox-has thrombocytopenia  Drain per IR  Anticipate 10-14 days course antibiotics unless complication  Plan to adjust immunosuppressives if needed due to need for XRT/treatment SCCA    Midline: No oral options    Plan of care discussed with TIFFANY Gandara M.D.. Will continue to follow    Tania Briggs M.D.

## 2024-03-10 NOTE — PROGRESS NOTES
Oncology/Hematology Progress Note               Author: Micheal Fajardo M.D. Date & Time created: 3/10/2024  12:54 PM   Squamous cell carcinoma of the skin  Sepsis  Interval History:  Patient is doing much better.  Lying comfortably in the bed. wife at the bedside.  Denies any fevers.  ID consult appreciated    Review of Systems:  Review of Systems   Constitutional:  Positive for malaise/fatigue. Negative for fever.   HENT:  Negative for hearing loss and sore throat.    Respiratory:  Negative for cough and shortness of breath.    Cardiovascular:  Negative for chest pain, palpitations and orthopnea.   Gastrointestinal:  Negative for abdominal pain, heartburn and vomiting.   Genitourinary:  Negative for dysuria and hematuria.   Musculoskeletal:  Negative for joint pain and myalgias.   Neurological:  Negative for dizziness and headaches.   Psychiatric/Behavioral:  Negative for depression. The patient is nervous/anxious.        Physical Exam:  Physical Exam  Constitutional:       General: He is not in acute distress.     Appearance: Normal appearance. He is not toxic-appearing.   HENT:      Mouth/Throat:      Mouth: Mucous membranes are moist.      Pharynx: No oropharyngeal exudate.   Eyes:      General: No scleral icterus.     Pupils: Pupils are equal, round, and reactive to light.   Cardiovascular:      Rate and Rhythm: Normal rate and regular rhythm.   Pulmonary:      Effort: Pulmonary effort is normal. No respiratory distress.   Abdominal:      General: Abdomen is flat. Bowel sounds are normal. There is no distension.      Palpations: There is no mass.      Tenderness: There is no abdominal tenderness. There is no rebound.      Hernia: No hernia is present.   Skin:     General: Skin is warm.      Findings: Lesion present.   Neurological:      General: No focal deficit present.      Mental Status: He is alert and oriented to person, place, and time.   Psychiatric:         Mood and Affect: Mood normal.         Behavior:  Behavior normal.         Labs:          Recent Labs     03/08/24  0230 03/09/24  0300 03/10/24  0022   SODIUM 133* 128* 128*   POTASSIUM 5.4 5.4 5.2   CHLORIDE 103 99 98   CO2 19* 18* 15*   BUN 54* 63* 71*   CREATININE 2.67* 2.55* 2.78*   PHOSPHORUS  --   --  3.0   CALCIUM 7.9* 7.9* 8.0*     Recent Labs     03/08/24  0230 03/09/24  0300 03/10/24  0022   ALTSGPT 19  --   --    ASTSGOT 27  --   --    ALKPHOSPHAT 61  --   --    TBILIRUBIN 0.7  --   --    GLUCOSE 359* 317* 247*     Recent Labs     03/08/24  0230 03/09/24  0300 03/10/24  002   RBC 4.22* 4.18* 4.20*   HEMOGLOBIN 12.1* 11.8* 12.0*   HEMATOCRIT 38.0* 37.8* 37.4*   PLATELETCT 72* 81* 104*     Recent Labs     03/08/24  0230 03/09/24  0300 03/10/24  002   WBC 13.9* 13.8* 12.0*   NEUTSPOLYS 92.30*  --   --    LYMPHOCYTES 1.60*  --   --    MONOCYTES 5.00  --   --    EOSINOPHILS 0.10  --   --    BASOPHILS 0.20  --   --    ASTSGOT 27  --   --    ALTSGPT 19  --   --    ALKPHOSPHAT 61  --   --    TBILIRUBIN 0.7  --   --      Recent Labs     03/08/24  0230 03/09/24  0300 03/10/24  0022   SODIUM 133* 128* 128*   POTASSIUM 5.4 5.4 5.2   CHLORIDE 103 99 98   CO2 19* 18* 15*   GLUCOSE 359* 317* 247*   BUN 54* 63* 71*   CREATININE 2.67* 2.55* 2.78*   CALCIUM 7.9* 7.9* 8.0*     Hemodynamics:  Temp (24hrs), Av.3 °C (97.4 °F), Min:36.2 °C (97.1 °F), Max:36.4 °C (97.6 °F)  Temperature: 36.4 °C (97.6 °F)  Pulse  Av.8  Min: 54  Max: 126   Blood Pressure : 104/68     Respiratory:    Respiration: 18, Pulse Oximetry: 93 %        RUL Breath Sounds: Clear, RML Breath Sounds: Clear, RLL Breath Sounds: Diminished, BERNARD Breath Sounds: Clear, LLL Breath Sounds: Diminished  Fluids:    Intake/Output Summary (Last 24 hours) at 3/9/2024 1034  Last data filed at 3/9/2024 0546  Gross per 24 hour   Intake 3196.99 ml   Output 35 ml   Net 3161.99 ml        GI/Nutrition:  Orders Placed This Encounter   Procedures    Diet Order Diet: Cardiac     Standing Status:   Standing     Number of  Occurrences:   1     Order Specific Question:   Diet:     Answer:   Cardiac [6]     Medical Decision Making, by Problem:  Active Hospital Problems    Diagnosis     *Septic shock (HCC) [A41.9, R65.21]     Axillary mass, left [R22.32]     Left arm swelling [M79.89]     Peripheral neuropathy [G62.9]     Squamous cell carcinoma of skin [C44.92]     Abscess of axilla, left [L02.412]     Severe sepsis (HCC) [A41.9, R65.20]     Presence of Watchman left atrial appendage closure device [Z95.818]     Abdominal aortic aneurysm (AAA) without rupture (HCC) [I71.40]     Immunosuppressed status (HCC) [D84.9]     Acute hypoxic respiratory failure (HCC) [J96.01]     AF (atrial fibrillation) (HCC) [I48.91]     Type 2 diabetes mellitus (HCC) [E11.9]     Thrombocytopenia (HCC) [D69.6]     GERD (gastroesophageal reflux disease) [K21.9]     Primary hypertension [I10]     Acute kidney injury superimposed on chronic kidney disease (HCC) [N17.9, N18.9]     Coronary artery disease [I25.10]     Hyperlipidemia [E78.5]     History of renal transplant [Z94.0]        Plan:  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  Further management will depend upon the results of the PET scan.  Will also discuss with radiation oncology.  I explained to the patient that in advanced nonsurgical patients with skin cancer, standard of care is immunotherapy treatment with cemiplimab.  However concerning issues that the patient is on immunosuppression for his history of renal transplant.  Will discuss with his nephrologist as an outpatient.  He is currently under the care of Dr. León.  History of renal cell transplant-currently on time sirolimus and mycophenolate.  I will see if we can reduce his immune suppression so as to make his cemiplimab therapy more effective.  Will sign off.  Reconsult as needed    Quality-Core Measures

## 2024-03-10 NOTE — CARE PLAN
Problem: Pain - Standard  Goal: Alleviation of pain or a reduction in pain to the patient’s comfort goal  Outcome: Progressing     Problem: Knowledge Deficit - Standard  Goal: Patient and family/care givers will demonstrate understanding of plan of care, disease process/condition, diagnostic tests and medications  Outcome: Progressing     The patient is Watcher - Medium risk of patient condition declining or worsening    Shift Goals  Clinical Goals: Pt will tolerate antibiotic treatment  Patient Goals: less loose BM, rest  Family Goals: updates    Progress made toward(s) clinical / shift goals:  Pt educated on POC    Patient is not progressing towards the following goals:

## 2024-03-10 NOTE — PROGRESS NOTES
"[facility] -  PROGRESS NOTE               Author: Yvrose Alvares D.O. Date & Time: 3/10/2024  1:27 PM     HPI:  66 year old male with a PMHx of HTN, polycystic kidney disease with ESRD s/p renal transplant 2010 at [facility] [place] on IS meds, DM, past bacteremia/septic shock, SCC of the axilla (diagnosed 12/2023, not yet on chemo) presented 3/7 with left axillary swelling and pain, found to have necrotic infection and sepsis. Initially started on broad spectrum abx and volume expansion but developed shock promoting transfer to the ICU with initiation of pressors. Underwent IR drain placement on necrotic lesions on 3/7. Stress dose steroids. MMF held, tacrolimus transiently held. Shock resolved, transferred out of ICU. Hem/onc consulted, without plans for inpatient therapy.  Baseline cr appears ~1.8, on FK 1mg BID, MMF, pred at home, notes stable levels for many years. Cr elevated to 2.6 on admission and has remained stable since. UA on 3/7 without blood or protein. Urine output not well documented.  Currently without chest pain, sob. Some mild nausea. No ongoing f/c/s.        DAILY NEPHROLOGY SUMMARY:  03/09 - consult done  03/10 - SCr trending up, CO2 and Na trending down, SBP 100s-110s, UOP not fully recorded, c/o edema, no other new c/o    REVIEW OF SYSTEMS:    10 point ROS reviewed and is as per HPI/daily summary or otherwise negative    PMH/PSH/SH/FH: Reviewed and unchanged since admission note  CURRENT MEDICATIONS: Reviewed from admission to present day    VS:  /68   Pulse 67   Temp 36.4 °C (97.6 °F) (Temporal)   Resp 18   Ht 1.956 m (6' 5.01\")   Wt 115 kg (253 lb 8.5 oz)   SpO2 93%   BMI 30.06 kg/m²   Physical Exam  Vitals and nursing note reviewed.   Constitutional:       General: He is not in acute distress.  HENT:      Head: Normocephalic and atraumatic.      Right Ear: External ear normal.      Left Ear: External ear normal.      Nose: Nose normal.      Mouth/Throat:      " Mouth: Mucous membranes are moist.      Pharynx: Oropharynx is clear.   Eyes:      General: No scleral icterus.     Extraocular Movements: Extraocular movements intact.   Cardiovascular:      Rate and Rhythm: Normal rate and regular rhythm.   Pulmonary:      Effort: Pulmonary effort is normal.      Breath sounds: No wheezing.   Abdominal:      General: There is no distension.      Palpations: Abdomen is soft.   Musculoskeletal:      Right lower leg: Edema present.      Left lower leg: Edema present.   Skin:     General: Skin is warm and dry.      Coloration: Skin is not jaundiced.   Neurological:      General: No focal deficit present.      Mental Status: He is alert and oriented to person, place, and time.      Cranial Nerves: No cranial nerve deficit.   Psychiatric:         Mood and Affect: Mood normal.         Behavior: Behavior normal.         Fluids:  In: -   Out: 385     LABS:  Recent Labs     03/08/24  0230 03/09/24  0300 03/10/24  0022   SODIUM 133* 128* 128*   POTASSIUM 5.4 5.4 5.2   CHLORIDE 103 99 98   CO2 19* 18* 15*   GLUCOSE 359* 317* 247*   BUN 54* 63* 71*   CREATININE 2.67* 2.55* 2.78*   CALCIUM 7.9* 7.9* 8.0*              ASSESSMENT:  # SANKET: In setting of septic shock.   -UA bland  # Renal Transplant  -Baseline cr appears ~1.8  [place] [facility] [place] [facility] [place]  -On FK 1mg BID, MMF 500gm BID, pred 5mg at home  # Immunospurresed state  # Septic Shock:   -necrotic skin infection/abscess  -broad spectrum abx, s/p IR drain placement   # Hyponatremia:  # Acidosis   # SCC of the axilla:   -non-resectable.  -Per hem/onc liekly needs immunotherapy/rad therapy. Renal transplant complicating picture  # Anemia  # PAF  # DM  # CAD  # Thrombocytopenia: chornic     SUGGESTIONS:  -No need for further IV fluids  -Continue tacrolimus 1mg BID, holding on FK level for now given transiently held  -Restart MMF 500mg BID  -Restart pred 5mg daily once stress dose steroids stopped  -Will need to discuss case with transplant center/onc early  next week to determine if need to switch to alternate IS therapy given malignancy/pending immunotherapy  -Abx per primary team  -Strict Ios  -Dose all meds per eGFR   -Daily labs  - lasix 40mg IV BID, with parameters  - will need to consider low dose midodrine if unable to diurese

## 2024-03-11 ENCOUNTER — APPOINTMENT (OUTPATIENT)
Dept: RADIOLOGY | Facility: MEDICAL CENTER | Age: 69
DRG: 871 | End: 2024-03-11
Attending: NURSE PRACTITIONER
Payer: MEDICARE

## 2024-03-11 PROBLEM — E87.1 HYPONATREMIA: Status: ACTIVE | Noted: 2024-03-11

## 2024-03-11 LAB
ANION GAP SERPL CALC-SCNC: 13 MMOL/L (ref 7–16)
APPEARANCE UR: CLEAR
BACTERIA BLD CULT: NORMAL
BILIRUB UR QL STRIP.AUTO: NEGATIVE
BUN SERPL-MCNC: 77 MG/DL (ref 8–22)
CALCIUM SERPL-MCNC: 7.8 MG/DL (ref 8.5–10.5)
CHLORIDE SERPL-SCNC: 99 MMOL/L (ref 96–112)
CO2 SERPL-SCNC: 16 MMOL/L (ref 20–33)
COLOR UR: YELLOW
CREAT SERPL-MCNC: 3.03 MG/DL (ref 0.5–1.4)
ERYTHROCYTE [DISTWIDTH] IN BLOOD BY AUTOMATED COUNT: 44.5 FL (ref 35.9–50)
GFR SERPLBLD CREATININE-BSD FMLA CKD-EPI: 22 ML/MIN/1.73 M 2
GLUCOSE BLD STRIP.AUTO-MCNC: 231 MG/DL (ref 65–99)
GLUCOSE BLD STRIP.AUTO-MCNC: 294 MG/DL (ref 65–99)
GLUCOSE BLD STRIP.AUTO-MCNC: 297 MG/DL (ref 65–99)
GLUCOSE BLD STRIP.AUTO-MCNC: 298 MG/DL (ref 65–99)
GLUCOSE SERPL-MCNC: 199 MG/DL (ref 65–99)
GLUCOSE UR STRIP.AUTO-MCNC: 100 MG/DL
HCT VFR BLD AUTO: 36 % (ref 42–52)
HGB BLD-MCNC: 11.9 G/DL (ref 14–18)
KETONES UR STRIP.AUTO-MCNC: NEGATIVE MG/DL
LEUKOCYTE ESTERASE UR QL STRIP.AUTO: NEGATIVE
MAGNESIUM SERPL-MCNC: 2.3 MG/DL (ref 1.5–2.5)
MCH RBC QN AUTO: 28.8 PG (ref 27–33)
MCHC RBC AUTO-ENTMCNC: 33.1 G/DL (ref 32.3–36.5)
MCV RBC AUTO: 87.2 FL (ref 81.4–97.8)
MICRO URNS: ABNORMAL
NITRITE UR QL STRIP.AUTO: NEGATIVE
PH UR STRIP.AUTO: 5 [PH] (ref 5–8)
PHOSPHATE SERPL-MCNC: 3.4 MG/DL (ref 2.5–4.5)
PLATELET # BLD AUTO: 112 K/UL (ref 164–446)
PMV BLD AUTO: 11.9 FL (ref 9–12.9)
POTASSIUM SERPL-SCNC: 4.7 MMOL/L (ref 3.6–5.5)
PROT UR QL STRIP: NEGATIVE MG/DL
RBC # BLD AUTO: 4.13 M/UL (ref 4.7–6.1)
RBC UR QL AUTO: NEGATIVE
SIGNIFICANT IND 70042: NORMAL
SITE SITE: NORMAL
SODIUM SERPL-SCNC: 128 MMOL/L (ref 135–145)
SOURCE SOURCE: NORMAL
SP GR UR STRIP.AUTO: 1.01
TACROLIMUS BLD-MCNC: 3.7 NG/ML (ref 5–20)
TACROLIMUS BLD-MCNC: 5.2 NG/ML (ref 5–20)
UROBILINOGEN UR STRIP.AUTO-MCNC: 0.2 MG/DL
WBC # BLD AUTO: 10 K/UL (ref 4.8–10.8)

## 2024-03-11 PROCEDURE — 84100 ASSAY OF PHOSPHORUS: CPT

## 2024-03-11 PROCEDURE — 700102 HCHG RX REV CODE 250 W/ 637 OVERRIDE(OP): Performed by: STUDENT IN AN ORGANIZED HEALTH CARE EDUCATION/TRAINING PROGRAM

## 2024-03-11 PROCEDURE — 700111 HCHG RX REV CODE 636 W/ 250 OVERRIDE (IP): Performed by: HOSPITALIST

## 2024-03-11 PROCEDURE — 700102 HCHG RX REV CODE 250 W/ 637 OVERRIDE(OP): Performed by: INTERNAL MEDICINE

## 2024-03-11 PROCEDURE — A9270 NON-COVERED ITEM OR SERVICE: HCPCS | Performed by: INTERNAL MEDICINE

## 2024-03-11 PROCEDURE — A9270 NON-COVERED ITEM OR SERVICE: HCPCS | Performed by: STUDENT IN AN ORGANIZED HEALTH CARE EDUCATION/TRAINING PROGRAM

## 2024-03-11 PROCEDURE — 82962 GLUCOSE BLOOD TEST: CPT | Mod: 91

## 2024-03-11 PROCEDURE — 36415 COLL VENOUS BLD VENIPUNCTURE: CPT

## 2024-03-11 PROCEDURE — 80048 BASIC METABOLIC PNL TOTAL CA: CPT

## 2024-03-11 PROCEDURE — 99233 SBSQ HOSP IP/OBS HIGH 50: CPT | Performed by: STUDENT IN AN ORGANIZED HEALTH CARE EDUCATION/TRAINING PROGRAM

## 2024-03-11 PROCEDURE — 700105 HCHG RX REV CODE 258: Performed by: STUDENT IN AN ORGANIZED HEALTH CARE EDUCATION/TRAINING PROGRAM

## 2024-03-11 PROCEDURE — 81003 URINALYSIS AUTO W/O SCOPE: CPT

## 2024-03-11 PROCEDURE — 700111 HCHG RX REV CODE 636 W/ 250 OVERRIDE (IP): Performed by: INTERNAL MEDICINE

## 2024-03-11 PROCEDURE — 700101 HCHG RX REV CODE 250: Performed by: INTERNAL MEDICINE

## 2024-03-11 PROCEDURE — 73200 CT UPPER EXTREMITY W/O DYE: CPT | Mod: LT

## 2024-03-11 PROCEDURE — 700111 HCHG RX REV CODE 636 W/ 250 OVERRIDE (IP): Mod: JZ | Performed by: STUDENT IN AN ORGANIZED HEALTH CARE EDUCATION/TRAINING PROGRAM

## 2024-03-11 PROCEDURE — 700111 HCHG RX REV CODE 636 W/ 250 OVERRIDE (IP): Mod: JZ | Performed by: INTERNAL MEDICINE

## 2024-03-11 PROCEDURE — 770004 HCHG ROOM/CARE - ONCOLOGY PRIVATE *

## 2024-03-11 PROCEDURE — 99233 SBSQ HOSP IP/OBS HIGH 50: CPT | Performed by: INTERNAL MEDICINE

## 2024-03-11 PROCEDURE — 99231 SBSQ HOSP IP/OBS SF/LOW 25: CPT | Performed by: SURGERY

## 2024-03-11 PROCEDURE — 85027 COMPLETE CBC AUTOMATED: CPT

## 2024-03-11 PROCEDURE — 700101 HCHG RX REV CODE 250: Performed by: NURSE PRACTITIONER

## 2024-03-11 PROCEDURE — 83735 ASSAY OF MAGNESIUM: CPT

## 2024-03-11 RX ORDER — FUROSEMIDE 10 MG/ML
60 INJECTION INTRAMUSCULAR; INTRAVENOUS
Status: DISCONTINUED | OUTPATIENT
Start: 2024-03-11 | End: 2024-03-13

## 2024-03-11 RX ORDER — SODIUM BICARBONATE 650 MG/1
1300 TABLET ORAL 3 TIMES DAILY
Status: DISCONTINUED | OUTPATIENT
Start: 2024-03-11 | End: 2024-03-15

## 2024-03-11 RX ORDER — GABAPENTIN 400 MG/1
400 CAPSULE ORAL 2 TIMES DAILY
Status: DISCONTINUED | OUTPATIENT
Start: 2024-03-11 | End: 2024-03-16 | Stop reason: HOSPADM

## 2024-03-11 RX ORDER — GABAPENTIN 300 MG/1
CAPSULE ORAL
Qty: 360 CAPSULE | Refills: 1 | Status: SHIPPED | OUTPATIENT
Start: 2024-03-11 | End: 2024-03-16

## 2024-03-11 RX ADMIN — SODIUM BICARBONATE 1300 MG: 650 TABLET ORAL at 09:08

## 2024-03-11 RX ADMIN — GABAPENTIN 600 MG: 300 CAPSULE ORAL at 06:10

## 2024-03-11 RX ADMIN — OMEPRAZOLE 20 MG: 20 CAPSULE, DELAYED RELEASE ORAL at 06:10

## 2024-03-11 RX ADMIN — HEPARIN SODIUM 5000 UNITS: 5000 INJECTION, SOLUTION INTRAVENOUS; SUBCUTANEOUS at 06:10

## 2024-03-11 RX ADMIN — FUROSEMIDE 40 MG: 10 INJECTION, SOLUTION INTRAVENOUS at 06:10

## 2024-03-11 RX ADMIN — HYDROCORTISONE SODIUM SUCCINATE 50 MG: 100 INJECTION, POWDER, FOR SOLUTION INTRAMUSCULAR; INTRAVENOUS at 21:14

## 2024-03-11 RX ADMIN — HYDROCORTISONE SODIUM SUCCINATE 50 MG: 100 INJECTION, POWDER, FOR SOLUTION INTRAMUSCULAR; INTRAVENOUS at 15:21

## 2024-03-11 RX ADMIN — INSULIN HUMAN 7 UNITS: 100 INJECTION, SOLUTION PARENTERAL at 21:33

## 2024-03-11 RX ADMIN — SODIUM BICARBONATE 50 MEQ: 84 INJECTION, SOLUTION INTRAVENOUS at 09:52

## 2024-03-11 RX ADMIN — TACROLIMUS 1 MG: 1 CAPSULE ORAL at 17:46

## 2024-03-11 RX ADMIN — AMPICILLIN AND SULBACTAM 3 G: 1; 2 INJECTION, POWDER, FOR SOLUTION INTRAMUSCULAR; INTRAVENOUS at 17:44

## 2024-03-11 RX ADMIN — FUROSEMIDE 60 MG: 10 INJECTION INTRAMUSCULAR; INTRAVENOUS at 15:22

## 2024-03-11 RX ADMIN — AMPICILLIN AND SULBACTAM 3 G: 1; 2 INJECTION, POWDER, FOR SOLUTION INTRAMUSCULAR; INTRAVENOUS at 00:21

## 2024-03-11 RX ADMIN — HYDROCORTISONE SODIUM SUCCINATE 50 MG: 100 INJECTION, POWDER, FOR SOLUTION INTRAMUSCULAR; INTRAVENOUS at 06:10

## 2024-03-11 RX ADMIN — ACETAMINOPHEN 650 MG: 325 TABLET, FILM COATED ORAL at 21:41

## 2024-03-11 RX ADMIN — SODIUM CHLORIDE, PRESERVATIVE FREE 10 ML: 5 INJECTION INTRAVENOUS at 06:00

## 2024-03-11 RX ADMIN — LINEZOLID 600 MG: 600 TABLET, FILM COATED ORAL at 06:10

## 2024-03-11 RX ADMIN — ACETAMINOPHEN 650 MG: 325 TABLET, FILM COATED ORAL at 09:08

## 2024-03-11 RX ADMIN — HEPARIN SODIUM 5000 UNITS: 5000 INJECTION, SOLUTION INTRAVENOUS; SUBCUTANEOUS at 21:14

## 2024-03-11 RX ADMIN — INSULIN HUMAN 13 UNITS: 100 INJECTION, SUSPENSION SUBCUTANEOUS at 17:48

## 2024-03-11 RX ADMIN — INSULIN HUMAN 7 UNITS: 100 INJECTION, SOLUTION PARENTERAL at 12:52

## 2024-03-11 RX ADMIN — TACROLIMUS 1 MG: 1 CAPSULE ORAL at 06:10

## 2024-03-11 RX ADMIN — SODIUM HYPOCHLORITE 1 ML: 1.25 SOLUTION TOPICAL at 20:00

## 2024-03-11 RX ADMIN — LINEZOLID 600 MG: 600 TABLET, FILM COATED ORAL at 17:46

## 2024-03-11 RX ADMIN — AMPICILLIN AND SULBACTAM 3 G: 1; 2 INJECTION, POWDER, FOR SOLUTION INTRAMUSCULAR; INTRAVENOUS at 12:52

## 2024-03-11 RX ADMIN — INSULIN HUMAN 7 UNITS: 100 INJECTION, SOLUTION PARENTERAL at 17:00

## 2024-03-11 RX ADMIN — MYCOPHENOLATE MOFETIL 500 MG: 250 CAPSULE ORAL at 06:09

## 2024-03-11 RX ADMIN — SODIUM CHLORIDE, PRESERVATIVE FREE 10 ML: 5 INJECTION INTRAVENOUS at 17:59

## 2024-03-11 RX ADMIN — SODIUM HYPOCHLORITE 10 ML: 1.25 SOLUTION TOPICAL at 06:13

## 2024-03-11 RX ADMIN — GABAPENTIN 400 MG: 400 CAPSULE ORAL at 17:45

## 2024-03-11 RX ADMIN — AMPICILLIN AND SULBACTAM 3 G: 1; 2 INJECTION, POWDER, FOR SOLUTION INTRAMUSCULAR; INTRAVENOUS at 23:46

## 2024-03-11 RX ADMIN — AMPICILLIN AND SULBACTAM 3 G: 1; 2 INJECTION, POWDER, FOR SOLUTION INTRAMUSCULAR; INTRAVENOUS at 06:20

## 2024-03-11 RX ADMIN — ATORVASTATIN CALCIUM 80 MG: 80 TABLET, FILM COATED ORAL at 21:15

## 2024-03-11 RX ADMIN — SODIUM BICARBONATE 1300 MG: 650 TABLET ORAL at 15:21

## 2024-03-11 RX ADMIN — INSULIN HUMAN 4 UNITS: 100 INJECTION, SOLUTION PARENTERAL at 09:01

## 2024-03-11 RX ADMIN — HEPARIN SODIUM 5000 UNITS: 5000 INJECTION, SOLUTION INTRAVENOUS; SUBCUTANEOUS at 15:24

## 2024-03-11 RX ADMIN — INSULIN HUMAN 13 UNITS: 100 INJECTION, SUSPENSION SUBCUTANEOUS at 09:00

## 2024-03-11 RX ADMIN — SODIUM BICARBONATE 1300 MG: 650 TABLET ORAL at 21:15

## 2024-03-11 ASSESSMENT — ENCOUNTER SYMPTOMS
ABDOMINAL PAIN: 0
NAUSEA: 1
PALPITATIONS: 0
WEAKNESS: 0
TREMORS: 0
HEADACHES: 0
VOMITING: 0
NECK PAIN: 0
SPEECH CHANGE: 0
FEVER: 0
MYALGIAS: 1
COUGH: 0
CHILLS: 0
NAUSEA: 0
NERVOUS/ANXIOUS: 0
SHORTNESS OF BREATH: 0
SPUTUM PRODUCTION: 0
MYALGIAS: 0

## 2024-03-11 ASSESSMENT — COGNITIVE AND FUNCTIONAL STATUS - GENERAL
SUGGESTED CMS G CODE MODIFIER DAILY ACTIVITY: CH
SUGGESTED CMS G CODE MODIFIER MOBILITY: CH
DAILY ACTIVITIY SCORE: 24
MOBILITY SCORE: 24

## 2024-03-11 ASSESSMENT — PAIN DESCRIPTION - PAIN TYPE: TYPE: ACUTE PAIN

## 2024-03-11 NOTE — PROGRESS NOTES
Hospital Medicine Daily Progress Note    Date of Service  3/11/2024    Chief Complaint  Left arm swelling and discomfort.    Hospital Course  Jama Altman is a 68 y.o. male w/ a hx of afib recent watchman procedure,  CAD, renal transplant 2010 (hx of polycystic kidney disease), HLD, HTN and PAD.  He was admitted 3/6/2024 with weakness.  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  He had IR place a drain in left axilla 3/7.  A CT scan showed a large left axillary mass with necrosis.  He was hypotensive and received boluses and admitted with septic shock.    S/p IR drain placement 3/7    Interval Problem Update  Patient seen and examined at bedside  Reported the pain is better    Worsening SANKET, CRE 3 - hx of renal transplantation. hold cellcept per nephrology  Continue steroids, continue Lasix  Hyponatremia Na 128    Left arm abscess - drain in place, I discussed with IR, plan to repeat a CT without contrast today.    Wound culture positive for  Finegoldia magna, MRSA, Group D Enterococcus. I discussed with ID, added unasyn, continue zyvox.   Plan to repeat ct 3/12  Plan for antibiotics course 10-14 days if not complicated    I have discussed this patient's plan of care and discharge plan at IDT rounds today with Case Management, Nursing, Nursing leadership, and other members of the IDT team.    Consultants/Specialty  oncology    Code Status  Full Code    Disposition  The patient is not medically cleared for discharge to home or a post-acute facility.      I have placed the appropriate orders for post-discharge needs.    Review of Systems  Review of Systems   Constitutional:  Negative for malaise/fatigue.   HENT:  Negative for congestion.    Respiratory:  Negative for shortness of breath.    Cardiovascular:  Negative for chest pain and leg swelling.   Gastrointestinal:  Negative for nausea.   Musculoskeletal:  Negative for myalgias and neck pain.         Swelling of left arm.   Neurological:  Negative for tremors, speech change and headaches.   Psychiatric/Behavioral:  The patient is not nervous/anxious.         Physical Exam  Temp:  [36.2 °C (97.2 °F)-36.3 °C (97.4 °F)] 36.2 °C (97.2 °F)  Pulse:  [65-88] 88  Resp:  [18] 18  BP: (106-115)/(64-71) 107/64  SpO2:  [92 %-94 %] 94 %    Physical Exam  Vitals reviewed.   Constitutional:       Appearance: Normal appearance. He is not diaphoretic.   HENT:      Head: Normocephalic and atraumatic.      Nose: Nose normal.   Eyes:      General: No scleral icterus.        Right eye: No discharge.         Left eye: No discharge.      Extraocular Movements: Extraocular movements intact.      Conjunctiva/sclera: Conjunctivae normal.   Cardiovascular:      Rate and Rhythm: Normal rate. Rhythm irregular.      Pulses:           Radial pulses are 2+ on the right side and 2+ on the left side.        Dorsalis pedis pulses are 2+ on the right side and 2+ on the left side.      Heart sounds: No murmur heard.  Pulmonary:      Effort: Pulmonary effort is normal. No respiratory distress.      Breath sounds: Normal breath sounds. No wheezing or rales.   Abdominal:      General: Bowel sounds are normal. There is no distension.      Palpations: Abdomen is soft.      Tenderness: There is no abdominal tenderness.   Musculoskeletal:         General: No swelling or tenderness.      Cervical back: Neck supple. No muscular tenderness.      Right lower leg: No edema.      Left lower leg: No edema.      Comments: Swelling of left arm.   Skin:     Coloration: Skin is not jaundiced or pale.      Comments: Dressing over left axillary. Wound pictures in media tab evaluated 3/8   Neurological:      General: No focal deficit present.      Mental Status: He is alert and oriented to person, place, and time. Mental status is at baseline.      Cranial Nerves: No cranial nerve deficit.   Psychiatric:         Mood and Affect: Mood normal.         Behavior: Behavior  normal.         Fluids    Intake/Output Summary (Last 24 hours) at 3/11/2024 1646  Last data filed at 3/11/2024 0939  Gross per 24 hour   Intake 360 ml   Output 5 ml   Net 355 ml       Laboratory  Recent Labs     03/09/24  0300 03/10/24  0022 03/11/24  0058   WBC 13.8* 12.0* 10.0   RBC 4.18* 4.20* 4.13*   HEMOGLOBIN 11.8* 12.0* 11.9*   HEMATOCRIT 37.8* 37.4* 36.0*   MCV 90.4 89.0 87.2   MCH 28.2 28.6 28.8   MCHC 31.2* 32.1* 33.1   RDW 46.7 45.3 44.5   PLATELETCT 81* 104* 112*   MPV 12.6 12.6 11.9     Recent Labs     03/09/24  0300 03/10/24  0022 03/11/24  0058   SODIUM 128* 128* 128*   POTASSIUM 5.4 5.2 4.7   CHLORIDE 99 98 99   CO2 18* 15* 16*   GLUCOSE 317* 247* 199*   BUN 63* 71* 77*   CREATININE 2.55* 2.78* 3.03*   CALCIUM 7.9* 8.0* 7.8*                     Imaging  US-EXTREMITY VENOUS UPPER UNILAT LEFT   Final Result      CT-DRAIN-SKIN - SUBCUTANEOUS   Final Result      1. Ultrasound GUIDED PLACEMENT OF A PERCUTANEOUS DRAINAGE CATHETER IN A LEFT AXILLARY FLUID COLLECTION.      2.  THE CURRENT PLAN IS TO MONITOR DRAINAGE OUTPUT AND OBTAIN A FOLLOWUP CT SCAN IN 5-7 DAYS IF CLINICALLY INDICATED.      US-RENAL   Final Result      1.  Normal appearance of the right lower quadrant transplant kidney.   2.  Native left kidney demonstrates no hydronephrosis. There are innumerable cysts with very little normal-appearing parenchyma.   3.  Rounded echogenic masslike area in the inferior bladder. It is uncertain if this is related to an enlarged prostate gland protruding into the bladder base or a bladder mass. Recommend clinical correlation with history of hematuria.      CT-CHEST (THORAX) W/O   Final Result      1.  Partially visualized large left axillary mass and necrotic adenopathy is consistent with known squamous cell carcinoma. Central hypodensity in the larger mass may be related to necrosis.      2.  0.9 cm pleural-based nodule in the right lower lobe is nonspecific, possibly present on the prior exam where there  were patchy opacities. Follow-up per oncology protocol      3.  Patchy groundglass density and subsegmental atelectasis appears slightly improved from the prior CT. Findings are consistent with a chronic inflammatory/infectious process.      Fleischner Society pulmonary nodule recommendations:   Not Applicable         CT-SHOULDER W/O LEFT    (Results Pending)        Assessment/Plan  * Abscess of axilla, left  Assessment & Plan  Secondary to necrotic squamous cell cancer mass  S/p IR drain placement 3/7    Wound culture positive for  Finegoldia magna, MRSA, Group D Enterococcus.   I discussed with ID, added unasyn, continue zyvox.   Plan to repeat ct 3/12  Plan for antibiotics course 10-14 days if not complicated  I ordered am CBC, CMP to monitor        Acute hypoxic respiratory failure (HCC)- (present on admission)  Assessment & Plan  No baseline O2, now requiring 4L  CT chest without showing partially visualized large left axillary mass and necrotic adenopathy consistent with known SCC.  Patchy groundglass density and subsegmental atelectasis, consistent with chronic inflammatory/infectious process  Secondary to sepsis vs chronic inflammatory/infectious process  Supplemental o2 for O2 saturation >92% titrate oxygen as able  Empiric treatment with Cefepime/Linezolid  Management as above for sepsis    Hyponatremia  Assessment & Plan  Sodium 128   follow-up with nephrology recommendation      Adrenal insufficiency (HCC)  Assessment & Plan  Due to chronic steroid use  On hydrocotisone.    Squamous cell carcinoma of skin  Assessment & Plan  Of left axilla per biopsy on 2/23/24    I discussed with oncology, plan outpatient PET scan, immunotherapy and radiation therapy    Peripheral neuropathy  Assessment & Plan  Continue home Gabapentin 300 mg BID    Septic shock (HCC)- (present on admission)  Assessment & Plan  Sepsis protocols initiated   Linezolid 600 mg IV BID  Follow blood cultures   Prelim wound culture 3/8 appears  MRSA  Maintenance IVF with  mL bolus if MAP <65 or SBP <90  Monitor UOP, goal of 0.5 ml/kg/hr  Wean solu cortef as able.  Off pressors 3/7am.  General surgery consulted in the ED, recommend IR drainage  MRSA wound and continue linezolid    Presence of Watchman left atrial appendage closure device- (present on admission)  Assessment & Plan  Afib, s/p watchman procedure on 1/9/24 off anticoagulation  Rate controlled    Abdominal aortic aneurysm (AAA) without rupture (HCC)- (present on admission)  Assessment & Plan  History of, 3.2 cm on 1/27/23  Monitor blood pressure  Continue to monitor    Immunosuppressed status (HCC)- (present on admission)  Assessment & Plan  Await ordered 3/8 tacrolimus and mycophenolate levels  Both levels should be between 3 and 6  Per [facility] transplant team Dr. Carly Becerril ([phone]) okay to continue tacrolimus for now but hold mycophenolate    AF (atrial fibrillation) (HCC)- (present on admission)  Assessment & Plan  History of, in SR, s/p watchman procedure on 1/2024, off anticoagulation  Metoprolol on hold due to low bp and adrenal insufficiency   Monitor HR    Type 2 diabetes mellitus (HCC)- (present on admission)  Assessment & Plan  A1C (2/16/24): 6.9%    3/10 high BG, on steroids  I increased lantus to 13 units  Continue SSI  Close monitor hypoglycemia signs, treatment as needed    GERD (gastroesophageal reflux disease)- (present on admission)  Assessment & Plan  Controlled  Continue Omeprazole 20 mg daily    Thrombocytopenia (HCC)- (present on admission)  Assessment & Plan  3/10 Plt 121>81>104  Likely due to sepsis   I ordered am cbc to monitor  Monitor bleeding signs    Acute kidney injury superimposed on chronic kidney disease (HCC)- (present on admission)  Assessment & Plan  BUNs/CR 44/2.60 (baseline Cr ~2.0)  Possibly prerenal from sepsis  Having to hold mycophenolate   Restarted tacrolimus 3/9  U/S renal did not show any hydronephrosis nor any signs of active  rejection  Acute worsening concerning for recent hypotension.      3/11 Worsening SANKET, CRE 3, baseline 2  hold cellcept per nephrology  Continue steroids  Continue iv lasix  I reviewed nephro notes    Coronary artery disease- (present on admission)  Assessment & Plan  S/p MI with 2 stents in 2011  Hold aspirin 81 mg daily for possible surgical intervention  Continue Atorvastatin 80 mg daily  Metoprolol Succinate 25 mcg daily    Hyperlipidemia- (present on admission)  Assessment & Plan  Atorvastatin 80 mg for ongoing active treatment    Primary hypertension- (present on admission)  Assessment & Plan  Now adrenal insufficiency  Bp low, metoprolol on hold    History of renal transplant- (present on admission)  Assessment & Plan  2/2 polycystic kidney disease s/p right renal transplant in 2010  Holding home mycophenolate 500 mg twice daily and home tacrolimus 1 mg twice daily  Restart tacrolimus 3/9  CHecking tacrolimus and mycophenolate levels which per transplant team should be between 3 and 6  I consulted and discussed with Dr Alvares, [facility].      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 I discussed advance care planning with the patient at bedside, including diagnosis, prognosis, plan of care, risks and benefits of any therapies that could be offered.   We discussed regarding CODE STATUS, CPR and intubation with mechanical ventilation, discussed regarding risk and benefits. The patient is willing to  have all life sustaining efforts. Continue full code.  ACP: 16min         VTE prophylaxis: heparin    High complexity  I spent greater than 51 minutes for chart review, obtaining history independently, performing medically appropriate examination,  documenting , ordering medications, tests, or procedures, referring and communicating with other health care  professionals, Independently interpreting results and communicating results with patient/family/caregiver. More than 50% of time was spent in face-to-face clinical encounter.

## 2024-03-11 NOTE — TELEPHONE ENCOUNTER
Received request via: Pharmacy    Was the patient seen in the last year in this department? Yes    Does the patient have an active prescription (recently filled or refills available) for medication(s) requested? No    Pharmacy Name: [id number] - [place], [place] - [address] AT [facility] [address]     Does the patient have FPC Plus and need 100 day supply (blood pressure, diabetes and cholesterol meds only)? Medication is not for cholesterol, blood pressure or diabetes

## 2024-03-11 NOTE — PROGRESS NOTES
"[facility] -  PROGRESS NOTE               Author: Johnathan Melendez D.O. 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 Initially started on broad spectrum abx and volume expansion but developed shock promoting transfer to the ICU with initiation of pressors. Underwent IR drain placement on necrotic lesions on 3/7. Stress dose steroids. MMF held, tacrolimus transiently held. Shock resolved, transferred out of ICU. Hem/onc consulted, without plans for inpatient therapy.  Baseline cr appears ~1.8, on FK 1mg BID, MMF, pred at home, notes stable levels for many years. Cr elevated to 2.6 on admission and has remained stable since. UA on 3/7 without blood or protein. Urine output not well documented.  Currently without chest pain, sob. Some mild nausea. No ongoing f/c/s.        DAILY NEPHROLOGY SUMMARY:  03/09 - consult done  03/10 - SCr trending up, CO2 and Na trending down, SBP 100s-110s, UOP not fully recorded, c/o edema, no other new c/o  3/11: Cr continues to rise. Pt feels okay, peeing well. Edema still present    REVIEW OF SYSTEMS:    10 point ROS reviewed and is as per HPI/daily summary or otherwise negative    PMH/PSH/SH/FH: Reviewed and unchanged since admission note  CURRENT MEDICATIONS: Reviewed from admission to present day    VS:  /71   Pulse 65   Temp 36.2 °C (97.2 °F) (Temporal)   Resp 18   Ht 1.956 m (6' 5.01\")   Wt 115 kg (253 lb 8.5 oz)   SpO2 93%   BMI 30.06 kg/m²   Physical Exam  Vitals and nursing note reviewed.   Constitutional:       General: He is not in acute distress.  HENT:      Head: Normocephalic and atraumatic.      Right Ear: External ear normal.      Left Ear: " External ear normal.      Nose: Nose normal.      Mouth/Throat:      Mouth: Mucous membranes are moist.      Pharynx: Oropharynx is clear.   Eyes:      General: No scleral icterus.     Extraocular Movements: Extraocular movements intact.   Cardiovascular:      Rate and Rhythm: Normal rate and regular rhythm.   Pulmonary:      Effort: Pulmonary effort is normal.      Breath sounds: No wheezing.   Abdominal:      General: There is no distension.      Palpations: Abdomen is soft.   Musculoskeletal:      Right lower leg: Edema present.      Left lower leg: Edema present.   Skin:     General: Skin is warm and dry.      Coloration: Skin is not jaundiced.   Neurological:      General: No focal deficit present.      Mental Status: He is alert and oriented to person, place, and time.      Cranial Nerves: No cranial nerve deficit.   Psychiatric:         Mood and Affect: Mood normal.         Behavior: Behavior normal.         Fluids:  In: 3000 [P.O.:3000]  Out: 705     LABS:  Recent Labs     03/09/24  0300 03/10/24  0022 03/11/24  0058   SODIUM 128* 128* 128*   POTASSIUM 5.4 5.2 4.7   CHLORIDE 99 98 99   CO2 18* 15* 16*   GLUCOSE 317* 247* 199*   BUN 63* 71* 77*   CREATININE 2.55* 2.78* 3.03*   CALCIUM 7.9* 8.0* 7.8*              ASSESSMENT:  # SANKET: In setting of septic shock.   -UA bland  # Renal Transplant  -Baseline cr appears ~1.8  -2010 at [facility] [place]  -Outpt regimen: FK 1mg BID, MMF 500gm BID, pred 5mg   # Immunosuppressed state  # Septic Shock:   -necrotic skin infection/abscess  -broad spectrum abx, s/p IR drain placement   # Hyponatremia:  # Acidosis   # SCC of the axilla:   -non-resectable.  -Per hem/onc liekly needs immunotherapy/rad therapy. -Renal transplant complicating picture  -would avoid checkpoint (PD-1) immunotherapy as it has a higher risk of causing rejection  # Anemia  # PAF  # DM  # CAD  # Thrombocytopenia: chornic     SUGGESTIONS:    -Cr continues to rise, previous UA neg for blood making rejection less  likely  -ordered repeat UA for today to re-assess  -Continue tacrolimus 1mg BID, ordered FK level for tomorrow AM  -hold cellcept due to rising Cr which is likely infection related at this time  -finish stress dose steroids, can restart prednisone 5mg daily for transplant once completed  -will reach out to [facility] [place] transplant center ([phone]), pt last saw them in 10/2023  -Abx per primary team  -Strict I/Os  -Dose all meds per eGFR   -Increase lasix to 60mg IV BID for edema  - may need/consider low dose midodrine if BP is low with lasix  - ordered IV sodium bicarb, and start PO sodium bicarb 1300mg TID for acidosis    Please page nephrology with any questions or concerns

## 2024-03-11 NOTE — PROGRESS NOTES
"  DATE: 3/11/2024    INTERVAL EVENTS:  Minimal drain output.    PHYSICAL EXAMINATION:  Vital Signs: /71   Pulse 65   Temp 36.2 °C (97.2 °F) (Temporal)   Resp 18   Ht 1.956 m (6' 5.01\")   Wt 115 kg (253 lb 8.5 oz)   SpO2 93%     Scant serous drainage.    LABORATORY VALUES:  Recent Labs     03/09/24  0300 03/10/24  0022 03/11/24  0058   WBC 13.8* 12.0* 10.0   RBC 4.18* 4.20* 4.13*   HEMOGLOBIN 11.8* 12.0* 11.9*   HEMATOCRIT 37.8* 37.4* 36.0*   MCV 90.4 89.0 87.2   MCH 28.2 28.6 28.8   MCHC 31.2* 32.1* 33.1   RDW 46.7 45.3 44.5   PLATELETCT 81* 104* 112*   MPV 12.6 12.6 11.9     Recent Labs     03/09/24  0300 03/10/24  0022 03/11/24  0058   SODIUM 128* 128* 128*   POTASSIUM 5.4 5.2 4.7   CHLORIDE 99 98 99   CO2 18* 15* 16*   GLUCOSE 317* 247* 199*   BUN 63* 71* 77*   CREATININE 2.55* 2.78* 3.03*   CALCIUM 7.9* 8.0* 7.8*       ASSESSMENT AND PLAN:  Left axillary lymphatic leak. Trial of drain clamping.  Elevate left upper extremity.       ____________________________________     Eliseo Christensen M.D.    DD: 3/11/2024  9:03 AM    "

## 2024-03-11 NOTE — PROGRESS NOTES
Radiology Progress Note   Author: LARRY Lujan   Date & Time created: 3/11/2024  1:57 PM   Date of admission  3/6/2024  Note to reader: this note follows the APSO format rather than the historical SOAP format. Assessment and plan located at the top of the note for ease of use.    Chief Complaint  68 y.o. male admitted 3/6/2024 with   Chief Complaint   Patient presents with    Flu Like Symptoms     C/o flu like symptoms since yesterday. + body aches and chills. + lightheadedness.     Wound Check     Also c/o left wound possible infection. 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  He was admitted 03/06/2024 for CT finding of left axillary fluid collection after presenting to the ED for worsening axillary pain.  Patient underwent a left axillary abscess drain placement with IR Dr. Gutierrez on 03/07/2024.    Interval History:   03/08/24 - 12 Fr left axillary abscess drain to Left axilla with 455 mL turbid serosanguineous output in the last 24 hours. WBC 13.9. Cultures pending. On ABX. Drain flushed with 10 mL NS.     03/09/24 - 12 Fr left axillary abscess drain to Left axilla with 35 mL of serosanguineous output in the last 24 hours.  I flushed drain with 10 mL normal saline solution.  Order placed to have RNs irrigate wound with 10 mL NSS 2 times daily; I I reviewed today's labs: WBC 13.8; Hgb 11.8, Cr 2.55; Micro from axillary fluid positive for MRSA. I  Afebrile.    03/10/24 - 12 Fr left axillary abscess drain to Left axilla with 85 mL of serosanguineous output in the last 24 hours.  I flushed drain with 10 mL normal saline solution.  Order placed to have RNs irrigate wound with 10 mL NSS 2 times daily; I I reviewed today's labs: WBC 12.0;  Hgb 12.0, Cr 2.78; FSBS 208; micro from axillary fluid positive for Finegoldia magna, MRSA, enterococcus Faecalis. Afebrile.    03/11/24 - 12 Fr left axillary abscess drain to Left axilla with 5 mL of serosanguineous output in the last 24 hours. Labs reviewed: WBC 10.0; Hgb 11.9, Cr 3.03. Surgery had clamped drain earlier in the day and asked me to unclamp this afternoon and put drain back to suction. No immediate fluid from drain following placing back to suction. Pt discussed with surgery and hospitalist.     Assessment/Plan     Principal Problem:    Abscess of axilla, left  Active Problems:    History of renal transplant    Primary hypertension    Hyperlipidemia    Coronary artery disease    Acute kidney injury superimposed on chronic kidney disease (HCC)    Thrombocytopenia (HCC)    GERD (gastroesophageal reflux disease)    Type 2 diabetes mellitus (HCC)    AF (atrial fibrillation) (HCC)    Acute hypoxic respiratory failure (HCC)    Immunosuppressed status (HCC)    Abdominal aortic aneurysm (AAA) without rupture (HCC)    Presence of Watchman left atrial appendage closure device    Septic shock (HCC)    Peripheral neuropathy    Squamous cell carcinoma of skin    Severe sepsis (HCC)    Axillary mass, left    ACP (advance care planning)    Adrenal insufficiency (HCC)      Plan IR  - Continue orders for RNs to irrigate Left axillary drain with 10 ml of sterile saline each shift  - Cultures positive for Finegoldia magna, MRSA, enterococcus Faecalis  - ABX per ID  - general surgery following   - FU CT left shoulder w/o contrast ordered to re-evaluate abscess.  - Continue to monitor drains, VS, and labs      -Thank you for allowing Interventional Radiology team to participate in the patients care, if any additional care or requests are needed in the future please do not hesitate to call or place IR order   [phone]           Review of Systems  Physical Exam   Review of Systems   Constitutional:  Negative for chills and  fever.   Respiratory:  Negative for sputum production and shortness of breath.    Cardiovascular:  Negative for chest pain and palpitations.   Gastrointestinal:  Negative for abdominal pain, nausea and vomiting.   Musculoskeletal:         Left axillary pain (improving)   Neurological:  Negative for weakness and headaches.      Vitals:    03/11/24 0836   BP: 112/71   Pulse: 65   Resp: 18   Temp: 36.2 °C (97.2 °F)   SpO2: 93%        Physical Exam  Vitals and nursing note reviewed.   Constitutional:       General: He is not in acute distress.     Appearance: Normal appearance.   Cardiovascular:      Rate and Rhythm: Normal rate.      Pulses: Normal pulses.   Pulmonary:      Effort: Pulmonary effort is normal. No respiratory distress.   Abdominal:      Palpations: Abdomen is soft.   Musculoskeletal:         General: Tenderness (Left axilla improving) present.      Comments: IR drain to left axilla   Skin:     General: Skin is warm and dry.   Neurological:      General: No focal deficit present.      Mental Status: He is alert and oriented to person, place, and time.   Psychiatric:         Attention and Perception: Attention normal.         Mood and Affect: Mood normal.         Behavior: Behavior normal.             Labs    Recent Labs     03/09/24  0300 03/10/24  0022 03/11/24  0058   WBC 13.8* 12.0* 10.0   RBC 4.18* 4.20* 4.13*   HEMOGLOBIN 11.8* 12.0* 11.9*   HEMATOCRIT 37.8* 37.4* 36.0*   MCV 90.4 89.0 87.2   MCH 28.2 28.6 28.8   MCHC 31.2* 32.1* 33.1   RDW 46.7 45.3 44.5   PLATELETCT 81* 104* 112*   MPV 12.6 12.6 11.9     Recent Labs     03/09/24  0300 03/10/24  0022 03/11/24  0058   SODIUM 128* 128* 128*   POTASSIUM 5.4 5.2 4.7   CHLORIDE 99 98 99   CO2 18* 15* 16*   GLUCOSE 317* 247* 199*   BUN 63* 71* 77*   CREATININE 2.55* 2.78* 3.03*   CALCIUM 7.9* 8.0* 7.8*     Recent Labs     03/09/24  0300 03/10/24  0022 03/11/24  0058   ALBUMIN  --  3.0*  --    CREATININE 2.55* 2.78* 3.03*     US-EXTREMITY VENOUS UPPER  UNILAT LEFT   Final Result      CT-DRAIN-SKIN - SUBCUTANEOUS   Final Result      1. Ultrasound GUIDED PLACEMENT OF A PERCUTANEOUS DRAINAGE CATHETER IN A LEFT AXILLARY FLUID COLLECTION.      2.  THE CURRENT PLAN IS TO MONITOR DRAINAGE OUTPUT AND OBTAIN A FOLLOWUP CT SCAN IN 5-7 DAYS IF CLINICALLY INDICATED.      US-RENAL   Final Result      1.  Normal appearance of the right lower quadrant transplant kidney.   2.  Native left kidney demonstrates no hydronephrosis. There are innumerable cysts with very little normal-appearing parenchyma.   3.  Rounded echogenic masslike area in the inferior bladder. It is uncertain if this is related to an enlarged prostate gland protruding into the bladder base or a bladder mass. Recommend clinical correlation with history of hematuria.      CT-CHEST (THORAX) W/O   Final Result      1.  Partially visualized large left axillary mass and necrotic adenopathy is consistent with known squamous cell carcinoma. Central hypodensity in the larger mass may be related to necrosis.      2.  0.9 cm pleural-based nodule in the right lower lobe is nonspecific, possibly present on the prior exam where there were patchy opacities. Follow-up per oncology protocol      3.  Patchy groundglass density and subsegmental atelectasis appears slightly improved from the prior CT. Findings are consistent with a chronic inflammatory/infectious process.      Fleischner Society pulmonary nodule recommendations:   Not Applicable           INR   Date Value Ref Range Status   03/06/2024 1.34 (H) 0.87 - 1.13 Final     Comment:     INR - Non-therapeutic Reference Range: 0.87-1.13  INR - Therapeutic Reference Range: 2.0-4.0       POC INR   Date Value Ref Range Status   08/03/2022 2.1 (H) 0.9 - 1.2 Final     Comment:     INR - Non-therapeutic Reference Range: 0.9-1.2  INR - Therapeutic Reference Range: 2.0-4.0          Intake/Output Summary (Last 24 hours) at 3/8/2024 0804  Last data filed at 3/8/2024 0655  Gross per 24  hour   Intake 100 ml   Output 1025 ml   Net -925 ml      Labs not explicitly included in this progress note were reviewed by the author. Radiology/imaging not explicitly included in this progress note was reviewed by the author.     I have performed a physical exam and reviewed and updated ROS and Plan today (3/11/2024).     35 minutes in directly providing and coordinating care and extensive data review.  No time overlap and excludes procedures.

## 2024-03-11 NOTE — PROGRESS NOTES
Infectious Disease Progress Note    Author: Tania Briggs M.D. Date & Time of service: 3/11/2024  11:11 AM    Chief Complaint:  renal transplant  Skin cancer  Wound infection    Interval History:   68 y.o. male with PCKD and DM s/p renal transplant  admitted 3/6/2024 for nonhealing wound left axilla.  3/11 AF WBC 10 possible removal drain today-creat 3  Labs Reviewed, Medications Reviewed, and Wound Reviewed.    Review of Systems:  Review of Systems   Constitutional:  Negative for fever.   Respiratory:  Negative for cough and shortness of breath.    Gastrointestinal:  Positive for nausea. Negative for vomiting.   Musculoskeletal:  Positive for myalgias.   All other systems reviewed and are negative.      Hemodynamics:  Temp (24hrs), Av.3 °C (97.3 °F), Min:36.2 °C (97.1 °F), Max:36.3 °C (97.4 °F)  Temperature: 36.2 °C (97.2 °F)  Pulse  Av  Min: 54  Max: 126   Blood Pressure : 112/71       Physical Exam:  Physical Exam  Vitals and nursing note reviewed.   Constitutional:       General: He is not in acute distress.     Appearance: He is not toxic-appearing or diaphoretic.   Eyes:      General: No scleral icterus.     Extraocular Movements: Extraocular movements intact.      Pupils: Pupils are equal, round, and reactive to light.   Cardiovascular:      Rate and Rhythm: Normal rate.   Pulmonary:      Effort: Pulmonary effort is normal. No respiratory distress.      Breath sounds: No stridor.   Abdominal:      General: There is no distension.   Musculoskeletal:      Comments: Decreased swelling LUE  drain   Skin:     Coloration: Skin is jaundiced.   Neurological:      General: No focal deficit present.      Mental Status: He is alert and oriented to person, place, and time.         Meds:    Current Facility-Administered Medications:     sodium bicarbonate    gabapentin    ampicillin-sulbactam (UNASYN) IV    furosemide    insulin NPH    hydrocortisone sodium succinate PF    heparin    normal saline  flush    insulin regular **AND** POC blood glucose manual result **AND** NOTIFY MD and PharmD **AND** Administer 20 grams of glucose (approximately 8 ounces of fruit juice) every 15 minutes PRN FSBG less than 70 mg/dL **AND** dextrose bolus    linezolid    atorvastatin    [Held by provider] metoprolol SR    [Held by provider] mycophenolate    omeprazole    tacrolimus    acetaminophen    senna-docusate **AND** polyethylene glycol/lytes    ondansetron    ondansetron    dakins 0.125% (1/4 strength)    Labs:  Recent Labs     24  0300 03/10/24  0022 24  0058   WBC 13.8* 12.0* 10.0   RBC 4.18* 4.20* 4.13*   HEMOGLOBIN 11.8* 12.0* 11.9*   HEMATOCRIT 37.8* 37.4* 36.0*   MCV 90.4 89.0 87.2   MCH 28.2 28.6 28.8   RDW 46.7 45.3 44.5   PLATELETCT 81* 104* 112*   MPV 12.6 12.6 11.9     Recent Labs     24  0300 03/10/24  0022 24  0058   SODIUM 128* 128* 128*   POTASSIUM 5.4 5.2 4.7   CHLORIDE 99 98 99   CO2 18* 15* 16*   GLUCOSE 317* 247* 199*   BUN 63* 71* 77*     Recent Labs     24  0300 03/10/24  0022 24  0058   ALBUMIN  --  3.0*  --    CREATININE 2.55* 2.78* 3.03*       Imaging:  US-EXTREMITY VENOUS UPPER UNILAT LEFT    Result Date: 3/8/2024   Upper Extremity  Venous Duplex Report  Vascular Laboratory  CONCLUSIONS  1.  No left upper extremity DVT or SVT.  2.  There is subcutaneous edema.  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  Limitations:  PROCEDURES:  Left upper extremity venous duplex imaging.  The following venous structures were evaluated: internal jugular,  subclavian, axillary, brachial, cephalic, and basilic veins.  Serial compression, color, and spectral Doppler flow evaluations were  performed.  FINDINGS:  Left upper extremity.  All veins demonstrate complete color filling and compressibility with  normal venous flow dynamics including spontaneous flow and respiratory  phasicity.  No superficial or deep venous thrombosis.  Flow was evaluated in the contralateral subclavian vein and normal venous  flow dynamics including spontaneous flow and respiratory phasic variation  were demonstrated.  Johnna Fitch  (Electronically Signed)  Final Date:      08 March 2024                   10:30    CT-DRAIN-SKIN - SUBCUTANEOUS    Result Date: 3/8/2024  3/7/2024 11:56 AM HISTORY/REASON FOR EXAM: Left axillary fluid collection. TECHNIQUE/EXAM DESCRIPTION: Left axillary abscess drainage with ultrasound guidance. PROCEDURE: Informed consent was obtained. Localizing ultrasound images were obtained with the patient in supine position. The skin was prepped with ChloraPrep and draped in a sterile fashion. Sedation was not administered. Intraservice time was 15 minutes. Following local anesthesia with 1% Lidocaine, a 17 G guiding needle was placed and needle path confirmed with CT. An Amplatz guidewire was placed and following serial tract dilatation, a 12 Portuguese pigtail locking catheter was placed. A specimen was collected and submitted for culture and sensitivity and Gram stain. Total of 420 mL bloody malodorous fluid was drained. The catheter was secured to the skin and connected to suction bulb drainage. Final ultrasound images were obtained documenting catheter position. The patient tolerated the procedure well with no evidence of complication. COMPARISON: None  available. FINDINGS: The final ultrasound images show satisfactory catheter position within the target collection.     1. Ultrasound GUIDED PLACEMENT OF A PERCUTANEOUS DRAINAGE CATHETER IN A LEFT AXILLARY FLUID COLLECTION. 2.  THE CURRENT PLAN IS TO MONITOR DRAINAGE OUTPUT AND OBTAIN A FOLLOWUP CT SCAN IN 5-7 DAYS IF CLINICALLY INDICATED.    US-RENAL    Result Date: 3/7/2024  3/7/2024 9:21 AM HISTORY/REASON FOR EXAM:  Abnormal Labs TECHNIQUE/EXAM DESCRIPTION: Renal ultrasound. COMPARISON:  None FINDINGS: The right lower quadrant transplant kidney measures 11.96 cm.  The right kidney appears normal in contour and parenchymal echotexture. The corticomedullary differentiation is preserved. The right renal collecting system is not dilated. No hydronephrosis.  There are no renal calculi. The left native kidney measures 14.77 cm. There are innumerable cysts. There is very little normal-appearing parenchyma in the left kidney. The left renal collecting system is not dilated. No hydronephrosis. There are no renal calculi. The bladder demonstrates a rounded echogenic masslike focus with some minimal peripheral vascularity at the base abutting the wall of the inferior bladder measuring 1.7 x 1.9 x 1.7 cm. Bladder volume is 280 mL. The patient is unable to void during the examination.     1.  Normal appearance of the right lower quadrant transplant kidney. 2.  Native left kidney demonstrates no hydronephrosis. There are innumerable cysts with very little normal-appearing parenchyma. 3.  Rounded echogenic masslike area in the inferior bladder. It is uncertain if this is related to an enlarged prostate gland protruding into the bladder base or a bladder mass. Recommend clinical correlation with history of hematuria.    CT-CHEST (THORAX) W/O    Result Date: 3/6/2024  3/6/2024 8:42 PM HISTORY/REASON FOR EXAM:  L axilla squamous cell cancer, now with signs of infection. H/O CKD and renal transplant. TECHNIQUE/EXAM DESCRIPTION: CT  scan of the chest without contrast. Noncontrast helical scanning of the chest was obtained from the lung apices through the adrenal glands. Low dose optimization technique was utilized for this CT exam including automated exposure control and adjustment of the mA and/or kV according to patient size. COMPARISON: 5/24/2022 FINDINGS: Lungs: Patchy groundglass density and subsegmental atelectasis, slightly improved from the prior CT. A small pleural-based nodule in the right lower lobe measures approximately 0.9 cm. Mediastinum/Isadora: No significant adenopathy. Pleura: No pleural effusion. Cardiac: Mild cardiomegaly. A watchman's device is in the left atrial appendage. There are coronary artery calcifications. Vascular: Nonaneurysmal aorta with scattered arterial calcifications. Soft tissues: A partially visualized large left axillary mass measures at least 6.6 x 11.3 cm. There is an enlarged necrotic left axillary lymph node measuring approximately 2 cm short axis. There is stranding in the adjacent soft tissues. The overlying skin is only partially visualized but appears thickened. Upper abdomen: There are innumerable hypodense and hyperdense cysts in the partially visualized left kidney Bones: No suspicious bony lesions.     1.  Partially visualized large left axillary mass and necrotic adenopathy is consistent with known squamous cell carcinoma. Central hypodensity in the larger mass may be related to necrosis. 2.  0.9 cm pleural-based nodule in the right lower lobe is nonspecific, possibly present on the prior exam where there were patchy opacities. Follow-up per oncology protocol 3.  Patchy groundglass density and subsegmental atelectasis appears slightly improved from the prior CT. Findings are consistent with a chronic inflammatory/infectious process. Fleischner Society pulmonary nodule recommendations: Not Applicable     EC-RADHA W/O CONT    Result Date: 2/29/2024  Transesophageal Echo Report Echocardiography  Laboratory CONCLUSIONS The watchman device is well seated within the left atrial appendage without leakage. CIARA FORRESTER Exam Date:          2024                     10:58 Exam Location:      Inpatient Priority:            Routine Ordering Physician:        NAYELI CHAPMAN                             ([id number]) Referring Physician:       [id number] Mayberry Sonographer:               [facility] Age:    68     Gender:    M MRN:    [id number] :    1955 BSA:           Ht (in):           Wt (lb): Report Type:      Complete Indications:     Arrhythmia ICD Codes:       427.9 CPT Codes:       39750 BP:          /          HR: Technical Quality:       Fair MEASUREMENTS  (Male / Female) Normal Values * Indicates values subject to auto-interpretation LV EF:        % Medications Limitations Complications Proc. Components Epiq probe #  [id number] was used for this procedure. The probe was inserted and manipulated by Panda MONIQUE. FINDINGS Left Ventricle Right Ventricle Right Atrium Left Atrium LA Appendage The watchman device is well seated within the left atrial appendage without leakage. IA Septum IV Septum Mitral Valve Aortic Valve Tricuspid Valve Pulmonic Valve Pericardium Aorta Jaspreet GARCES To (Electronically Signed) Final Date:     2024                 11:47    IR-NEEDLE BX-LYMPH NODE    Result Date: 2024 8:46 AM HISTORY/REASON FOR EXAM:  Left axillary mass Procedure: After the informed consent the patient was placed on the ultrasound table and supine position. Ultrasound examination of the left axilla demonstrates a large necrotic lesion. Subsequently, after injecting lidocaine, a 19-gauge needle was advanced into the fluid collection. An approximately 250 mL of fluid was collected and sent it to the cytology and microbiology. Subsequently 2 core biopsies were obtained using 18-gauge biopsy needle. The patient tolerated procedure without any immediate complication.     Ultrasound-guided  aspiration and biopsy of left axillary necrotic lesion.    CT-SHOULDER WITH PLUS RECONS LEFT    Result Date: 2/20/2024 2/19/2024 4:29 PM HISTORY/REASON FOR EXAM:  Squamous cell carcinoma of the skin of left upper limb, including shoulder. TECHNIQUE/ EXAM DESCRIPTION AND NUMBER OF VIEWS:  CT scan of the LEFT shoulder with contrast with reconstructions. Axial spiral CT images were obtained of the upper extremity from the shoulder through the proximal third of the humerus. Images were reviewed at soft tissue and bone windows with administration of 165 mL of Omnipaque 350 nonionic contrast without complication. Sagittal reformations were then generated. Up to date radiation dose reduction adjustments have been utilized to meet ALARA standards for radiation dose reduction. COMPARISON: CTA chest 5/24/2022 FINDINGS: There is a 10 x 10 cm peripherally enhancing mass in the left axilla on image 79 series 2. This abuts the chest wall and dermis. There are a few adjacent small similar-appearing masses in the high left axilla on images 48, 56, and 62. Largest of these additional similar-appearing masses measures 2.3 cm. There is a new 6 mm left upper lobe nodule image 217 series 2. Mild scarring in the left lung. Mild DJD of the left glenohumeral joint and AC joint. No focal osseous lesions seen.     1. 10 x 10 cm left axillary mass, which could represent metastatic adenopathy or primary mass. 2. There are a few adjacent similar-appearing left axillary masses which are consistent with adenopathy, largest 2.3 cm. 3. 6 mm indeterminate left upper lobe pulmonary nodule. Recommend formal chest CT follow-up. 4. Mild DJD of the AC joint and glenohumeral joint.      Micro:  Results       Procedure Component Value Units Date/Time    URINALYSIS [[id number]]     Order Status: No result Specimen: Urine     Anaerobic Culture [[id number]]  (Abnormal) Collected: 03/07/24 1206    Order Status: Completed Specimen: Wound Updated: 03/10/24 6229      Significant Indicator POS     Source WND     Site L Axillary Fluid     Culture Result -      Finegoldia magna  Heavy growth      Narrative:      Left axillary fluid aspirate  Left axillary fluid aspirate    CULTURE WOUND W/ GRAM STAIN [[id number]]  (Abnormal)  (Susceptibility) Collected: 03/07/24 1206    Order Status: Completed Specimen: Wound Updated: 03/10/24 135     Significant Indicator POS     Source WND     Site L Axillary Fluid     Culture Result -     Gram Stain Result Rare WBCs.  Moderate Gram positive cocci.       Culture Result Methicillin Resistant Staphylococcus aureus  Heavy growth  This isolate is presumed to be clindamycin resistant based on  detection of inducible resistance.        Enterococcus faecalis  Heavy growth      Narrative:      Left axillary fluid aspirate  Left axillary fluid aspirate    Susceptibility       Methicillin resistant staphylococcus aureus (1)       Antibiotic Interpretation Microscan   Method Status    Clindamycin Resistant 0.5 mcg/mL CESIA Final    Cefazolin Resistant <=8 mcg/mL CESIA Final    Cefepime Resistant 8 mcg/mL CESIA Final    Daptomycin Sensitive <=0.5 mcg/mL CESIA Final    Erythromycin Resistant >4 mcg/mL CESIA Final    Ampicillin/sulbactam Resistant <=8/4 mcg/mL CESIA Final    Linezolid Sensitive 2 mcg/mL CESIA Final    Oxacillin Resistant >2 mcg/mL CESIA Final    Trimeth/Sulfa Sensitive <=0.5/9.5 mcg/mL CESIA Final    Tetracycline Resistant >8 mcg/mL CESIA Final    Vancomycin Sensitive 1 mcg/mL CESIA Final              Enterococcus faecalis (2)       Antibiotic Interpretation Microscan   Method Status    Ampicillin Sensitive <=2 mcg/mL CESIA Final    Daptomycin Sensitive 2 mcg/mL CESIA Final    Linezolid Sensitive 2 mcg/mL CESIA Final    Tetracycline Resistant >8 mcg/mL CESIA Final    Vancomycin Sensitive 1 mcg/mL CESIA Final    Gent Synergy Sensitive <=500 mcg/mL CESAI Final    Penicillin Sensitive 2 mcg/mL CESIA Final                       Urine Culture (New) [[id number]] Collected: 03/07/24  1733    Order Status: Completed Specimen: Urine Updated: 03/09/24 0833     Significant Indicator NEG     Source UR     Site -     Culture Result No growth at 48 hours.    Narrative:      Indication for culture:->Emergency Room Patient  Indication for culture:->Emergency Room Patient    GRAM STAIN [[id number]] Collected: 03/07/24 1206    Order Status: Completed Specimen: Wound Updated: 03/07/24 2031     Significant Indicator .     Source WND     Site L Axillary Fluid     Gram Stain Result Rare WBCs.  Moderate Gram positive cocci.      Narrative:      Left axillary fluid aspirate  Left axillary fluid aspirate    Urinalysis [[id number]]  (Abnormal) Collected: 03/07/24 1733    Order Status: Completed Specimen: Urine Updated: 03/07/24 1755     Color Yellow     Character Clear     Specific Gravity 1.017     Ph 5.0     Glucose Negative mg/dL      Ketones Trace mg/dL      Protein Negative mg/dL      Bilirubin Negative     Urobilinogen, Urine 0.2     Nitrite Negative     Leukocyte Esterase Negative     Occult Blood Negative     Micro Urine Req see below     Comment: Microscopic examination not performed when specimen is clear  and chemically negative for protein, blood, leukocyte esterase  and nitrite.         Narrative:      If not done within the last 24 hours    FLUID CULTURE W/GRAM STAIN [[id number]] Collected: 03/07/24 1206    Order Status: Canceled Specimen: Other Body Fluid     Aerobic/Anaerobic Culture (Surgery) [[id number]] Collected: 03/07/24 1206    Order Status: Canceled Specimen: Other Body Fluid     MRSA By PCR (Amp) [[id number]] Collected: 03/07/24 0215    Order Status: Completed Specimen: Respirate from Nares Updated: 03/07/24 1140     MRSA by PCR POSITIVE    Narrative:      If not done within the last 24 hours    Blood Culture - Draw one from central line and one from peripheral site [[id number]] Collected: 03/06/24 2025    Order Status: Completed Specimen: Blood from Peripheral Updated: 03/07/24 7822      Significant Indicator NEG     Source BLD     Site PERIPHERAL     Culture Result No Growth  Note: Blood cultures are incubated for 5 days and  are monitored continuously.Positive blood cultures  are called to the RN and reported as soon as  they are identified.      Narrative:      Blood Cultures X2. Draw one blood culture from central line  and one blood culture peripherally. If no line present, draw  blood cultures times two peripherally from different sites.  No site indicated    Blood Culture - Draw one from central line and one from peripheral site [[id number]] Collected: 03/06/24 2227    Order Status: Completed Specimen: Blood from Line Updated: 03/07/24 3026     Significant Indicator NEG     Source BLD     Site Peripheral     Culture Result No Growth  Note: Blood cultures are incubated for 5 days and  are monitored continuously.Positive blood cultures  are called to the RN and reported as soon as  they are identified.      Narrative:      Blood Cultures X2. Blood Cultures X2. Draw one blood culture  from central line and one blood culture peripherally. If no  line present, draw blood cultures times two peripherally from  different sites.  No site indicated            Assessment:  Active Hospital Problems    Diagnosis     *Abscess of axilla, left [L02.412]     ACP (advance care planning) [Z71.89]     Adrenal insufficiency (HCC) [E27.40]     Axillary mass, left [R22.32]     Septic shock (HCC) [A41.9, R65.21]     Peripheral neuropathy [G62.9]     Squamous cell carcinoma of skin [C44.92]     Severe sepsis (HCC) [A41.9, R65.20]     Presence of Watchman left atrial appendage closure device [Z95.818]     Abdominal aortic aneurysm (AAA) without rupture (HCC) [I71.40]     Immunosuppressed status (HCC) [D84.9]     Acute hypoxic respiratory failure (HCC) [J96.01]     AF (atrial fibrillation) (HCC) [I48.91]     Type 2 diabetes mellitus (HCC) [E11.9]     Thrombocytopenia (HCC) [D69.6]     GERD (gastroesophageal reflux  disease) [K21.9]     Primary hypertension [I10]     Acute kidney injury superimposed on chronic kidney disease (HCC) [N17.9, N18.9]     Coronary artery disease [I25.10]     Hyperlipidemia [E78.5]     History of renal transplant [Z94.0]      SCCA of the left chest/axilla  Wound infection with abscess after biopsy  -polymicrobial MRSA, Efaecalis, Fingoldia  -s/p drain 3/7  SANKET  S/p renal transplant-Nephrology eval appreciated  Thrombocytopenia      PLAN:   Continue Zyvox for MRSA  Continue Unasyn for treatment Efaecalis and to treat Fingoldia  Adjust abx for renal insuff  Monitor CBC on Zyvox-has thrombocytopenia  Drain per IR  Anticipate 10-14 days course antibiotics unless complication  Plan to adjust immunosuppressives if needed due to need for XRT/treatment SCCA     Midline: No- oral options are available

## 2024-03-11 NOTE — CARE PLAN
The patient is Watcher - Medium risk of patient condition declining or worsening    Shift Goals  Clinical Goals: Educate patient regarding fluid restriction; Wound care; NICKI drain management  Patient Goals: Get NICKI drain out  Family Goals: Get NICKI drain out and fight infection    Progress made toward(s) clinical / shift goals:    Problem: Pain - Standard  Goal: Alleviation of pain or a reduction in pain to the patient’s comfort goal  Outcome: Progressing     Problem: Knowledge Deficit - Standard  Goal: Patient and family/care givers will demonstrate understanding of plan of care, disease process/condition, diagnostic tests and medications  Outcome: Progressing     Problem: Hemodynamics  Goal: Patient's hemodynamics, fluid balance and neurologic status will be stable or improve  Outcome: Progressing     Problem: Fluid Volume  Goal: Fluid volume balance will be maintained  Outcome: Progressing     Problem: Urinary - Renal Perfusion  Goal: Ability to achieve and maintain adequate renal perfusion and functioning will improve  Outcome: Progressing     Problem: Respiratory  Goal: Patient will achieve/maintain optimum respiratory ventilation and gas exchange  Outcome: Progressing     Problem: Physical Regulation  Goal: Diagnostic test results will improve  Outcome: Progressing  Goal: Signs and symptoms of infection will decrease  Outcome: Progressing     Problem: Fall Risk  Goal: Patient will remain free from falls  Outcome: Progressing       Patient is not progressing towards the following goals:

## 2024-03-11 NOTE — PROGRESS NOTES
Assumed care of patient and received report from Soren MONTENEGRO. Assessment completed.Pt A&Ox 4. Respirations are even and unlabored on room air. Pt reports 2/10 pain. Medication per MAR. Medical pt, call light and belongings are within reach. POC updated. Pt educated on room and call light, pt verbalized understanding. Needs met.

## 2024-03-11 NOTE — CARE PLAN
The patient is Stable - Low risk of patient condition declining or worsening    Shift Goals  Clinical Goals: pain control, ABX  Patient Goals: rest, comfort  Family Goals: not present      Problem: Pain - Standard  Goal: Alleviation of pain or a reduction in pain to the patient’s comfort goal  Outcome: Progressing     Problem: Knowledge Deficit - Standard  Goal: Patient and family/care givers will demonstrate understanding of plan of care, disease process/condition, diagnostic tests and medications  Outcome: Progressing     Problem: Hemodynamics  Goal: Patient's hemodynamics, fluid balance and neurologic status will be stable or improve  Outcome: Progressing

## 2024-03-11 NOTE — DISCHARGE PLANNING
HTH/SCP TCN chart review completed. Collaborated with CM prior to meeting with the pt. The most current review of medical record, knowledge of pt's PLOF and social support, LACE+ score of 78, 6 clicks scores of 24 ADL's and 23 mobility were considered.      Pt seen at bedside. Introduced TCN program. Provided education regarding post acute levels of care. Education provided regarding case management policy for blanket SNF referrals. Discussed HTH/SCP plan benefits. Pt verbalizes understanding.     Patient states he lives with his fiancee in a one level home and was independent with ADL's, IADL's, mobility and driving at his baseline.  He denies any concerns with food, housing or transportation and states his family will provide transportation at discharge.  He states he is at his baseline level of function and has no functional concerns with discharge to home once medically cleared.      TCN will continue to follow and collaborate with discharge planning team as additional post acute needs arise. Thank you.     Completed today:  Choice obtained: None.  See above.   SCP with [facility] PCP.  Refered to schedulers for Follow up appointment.

## 2024-03-12 ENCOUNTER — ANTICOAGULATION MONITORING (OUTPATIENT)
Dept: MEDICAL GROUP | Facility: PHYSICIAN GROUP | Age: 69
End: 2024-03-12
Payer: MEDICARE

## 2024-03-12 DIAGNOSIS — Z86.79 HISTORY OF ATRIAL FIBRILLATION: ICD-10-CM

## 2024-03-12 DIAGNOSIS — Z79.01 LONG TERM (CURRENT) USE OF ANTICOAGULANTS: ICD-10-CM

## 2024-03-12 DIAGNOSIS — I82.622 ACUTE DEEP VEIN THROMBOSIS (DVT) OF LEFT UPPER EXTREMITY, UNSPECIFIED VEIN (HCC): ICD-10-CM

## 2024-03-12 LAB
ANION GAP SERPL CALC-SCNC: 12 MMOL/L (ref 7–16)
BACTERIA BLD CULT: NORMAL
BUN SERPL-MCNC: 80 MG/DL (ref 8–22)
CALCIUM SERPL-MCNC: 8 MG/DL (ref 8.5–10.5)
CHLORIDE SERPL-SCNC: 102 MMOL/L (ref 96–112)
CO2 SERPL-SCNC: 20 MMOL/L (ref 20–33)
CREAT SERPL-MCNC: 2.93 MG/DL (ref 0.5–1.4)
ERYTHROCYTE [DISTWIDTH] IN BLOOD BY AUTOMATED COUNT: 45 FL (ref 35.9–50)
GFR SERPLBLD CREATININE-BSD FMLA CKD-EPI: 23 ML/MIN/1.73 M 2
GLUCOSE BLD STRIP.AUTO-MCNC: 208 MG/DL (ref 65–99)
GLUCOSE BLD STRIP.AUTO-MCNC: 260 MG/DL (ref 65–99)
GLUCOSE BLD STRIP.AUTO-MCNC: 261 MG/DL (ref 65–99)
GLUCOSE BLD STRIP.AUTO-MCNC: 268 MG/DL (ref 65–99)
GLUCOSE BLD STRIP.AUTO-MCNC: 356 MG/DL (ref 65–99)
GLUCOSE SERPL-MCNC: 241 MG/DL (ref 65–99)
HCT VFR BLD AUTO: 38.9 % (ref 42–52)
HGB BLD-MCNC: 12.7 G/DL (ref 14–18)
MCH RBC QN AUTO: 28.4 PG (ref 27–33)
MCHC RBC AUTO-ENTMCNC: 32.6 G/DL (ref 32.3–36.5)
MCV RBC AUTO: 87 FL (ref 81.4–97.8)
PLATELET # BLD AUTO: 127 K/UL (ref 164–446)
PMV BLD AUTO: 11.8 FL (ref 9–12.9)
POTASSIUM SERPL-SCNC: 4.4 MMOL/L (ref 3.6–5.5)
RBC # BLD AUTO: 4.47 M/UL (ref 4.7–6.1)
SIGNIFICANT IND 70042: NORMAL
SITE SITE: NORMAL
SODIUM SERPL-SCNC: 134 MMOL/L (ref 135–145)
SOURCE SOURCE: NORMAL
TACROLIMUS BLD-MCNC: 5.7 NG/ML (ref 5–20)
WBC # BLD AUTO: 8.4 K/UL (ref 4.8–10.8)

## 2024-03-12 PROCEDURE — 700111 HCHG RX REV CODE 636 W/ 250 OVERRIDE (IP): Performed by: HOSPITALIST

## 2024-03-12 PROCEDURE — 700102 HCHG RX REV CODE 250 W/ 637 OVERRIDE(OP): Performed by: STUDENT IN AN ORGANIZED HEALTH CARE EDUCATION/TRAINING PROGRAM

## 2024-03-12 PROCEDURE — A9270 NON-COVERED ITEM OR SERVICE: HCPCS | Performed by: STUDENT IN AN ORGANIZED HEALTH CARE EDUCATION/TRAINING PROGRAM

## 2024-03-12 PROCEDURE — 36415 COLL VENOUS BLD VENIPUNCTURE: CPT

## 2024-03-12 PROCEDURE — 700105 HCHG RX REV CODE 258: Performed by: STUDENT IN AN ORGANIZED HEALTH CARE EDUCATION/TRAINING PROGRAM

## 2024-03-12 PROCEDURE — 80048 BASIC METABOLIC PNL TOTAL CA: CPT

## 2024-03-12 PROCEDURE — 700105 HCHG RX REV CODE 258: Performed by: INTERNAL MEDICINE

## 2024-03-12 PROCEDURE — 99233 SBSQ HOSP IP/OBS HIGH 50: CPT | Performed by: STUDENT IN AN ORGANIZED HEALTH CARE EDUCATION/TRAINING PROGRAM

## 2024-03-12 PROCEDURE — 82962 GLUCOSE BLOOD TEST: CPT | Mod: 91

## 2024-03-12 PROCEDURE — A9270 NON-COVERED ITEM OR SERVICE: HCPCS | Performed by: INTERNAL MEDICINE

## 2024-03-12 PROCEDURE — 85027 COMPLETE CBC AUTOMATED: CPT

## 2024-03-12 PROCEDURE — 700111 HCHG RX REV CODE 636 W/ 250 OVERRIDE (IP): Mod: JZ | Performed by: STUDENT IN AN ORGANIZED HEALTH CARE EDUCATION/TRAINING PROGRAM

## 2024-03-12 PROCEDURE — 700101 HCHG RX REV CODE 250: Performed by: NURSE PRACTITIONER

## 2024-03-12 PROCEDURE — 80197 ASSAY OF TACROLIMUS: CPT

## 2024-03-12 PROCEDURE — 97602 WOUND(S) CARE NON-SELECTIVE: CPT

## 2024-03-12 PROCEDURE — 770004 HCHG ROOM/CARE - ONCOLOGY PRIVATE *

## 2024-03-12 PROCEDURE — 700102 HCHG RX REV CODE 250 W/ 637 OVERRIDE(OP): Performed by: INTERNAL MEDICINE

## 2024-03-12 PROCEDURE — 700111 HCHG RX REV CODE 636 W/ 250 OVERRIDE (IP): Mod: JZ | Performed by: INTERNAL MEDICINE

## 2024-03-12 PROCEDURE — 99233 SBSQ HOSP IP/OBS HIGH 50: CPT | Performed by: INTERNAL MEDICINE

## 2024-03-12 RX ADMIN — HEPARIN SODIUM 5000 UNITS: 5000 INJECTION, SOLUTION INTRAVENOUS; SUBCUTANEOUS at 21:55

## 2024-03-12 RX ADMIN — INSULIN HUMAN 12 UNITS: 100 INJECTION, SOLUTION PARENTERAL at 18:38

## 2024-03-12 RX ADMIN — HYDROCORTISONE SODIUM SUCCINATE 50 MG: 100 INJECTION, POWDER, FOR SOLUTION INTRAMUSCULAR; INTRAVENOUS at 21:55

## 2024-03-12 RX ADMIN — HYDROCORTISONE SODIUM SUCCINATE 50 MG: 100 INJECTION, POWDER, FOR SOLUTION INTRAMUSCULAR; INTRAVENOUS at 05:17

## 2024-03-12 RX ADMIN — AMPICILLIN AND SULBACTAM 3 G: 1; 2 INJECTION, POWDER, FOR SOLUTION INTRAMUSCULAR; INTRAVENOUS at 14:00

## 2024-03-12 RX ADMIN — INSULIN HUMAN 13 UNITS: 100 INJECTION, SUSPENSION SUBCUTANEOUS at 18:38

## 2024-03-12 RX ADMIN — GABAPENTIN 400 MG: 400 CAPSULE ORAL at 06:00

## 2024-03-12 RX ADMIN — AMPICILLIN AND SULBACTAM 3 G: 1; 2 INJECTION, POWDER, FOR SOLUTION INTRAMUSCULAR; INTRAVENOUS at 05:18

## 2024-03-12 RX ADMIN — OMEPRAZOLE 20 MG: 20 CAPSULE, DELAYED RELEASE ORAL at 05:17

## 2024-03-12 RX ADMIN — TACROLIMUS 1 MG: 1 CAPSULE ORAL at 18:29

## 2024-03-12 RX ADMIN — GABAPENTIN 400 MG: 400 CAPSULE ORAL at 18:29

## 2024-03-12 RX ADMIN — SODIUM CHLORIDE, PRESERVATIVE FREE 10 ML: 5 INJECTION INTRAVENOUS at 18:31

## 2024-03-12 RX ADMIN — ATORVASTATIN CALCIUM 80 MG: 80 TABLET, FILM COATED ORAL at 21:54

## 2024-03-12 RX ADMIN — AMPICILLIN AND SULBACTAM 3 G: 1; 2 INJECTION, POWDER, FOR SOLUTION INTRAMUSCULAR; INTRAVENOUS at 18:30

## 2024-03-12 RX ADMIN — SODIUM HYPOCHLORITE 1 ML: 1.25 SOLUTION TOPICAL at 06:00

## 2024-03-12 RX ADMIN — AMPICILLIN AND SULBACTAM 3 G: 1; 2 INJECTION, POWDER, FOR SOLUTION INTRAMUSCULAR; INTRAVENOUS at 23:39

## 2024-03-12 RX ADMIN — SODIUM BICARBONATE 1300 MG: 650 TABLET ORAL at 21:54

## 2024-03-12 RX ADMIN — HYDROCORTISONE SODIUM SUCCINATE 50 MG: 100 INJECTION, POWDER, FOR SOLUTION INTRAMUSCULAR; INTRAVENOUS at 14:01

## 2024-03-12 RX ADMIN — SODIUM CHLORIDE, PRESERVATIVE FREE 10 ML: 5 INJECTION INTRAVENOUS at 06:00

## 2024-03-12 RX ADMIN — LINEZOLID 600 MG: 600 TABLET, FILM COATED ORAL at 05:18

## 2024-03-12 RX ADMIN — LINEZOLID 600 MG: 600 TABLET, FILM COATED ORAL at 18:28

## 2024-03-12 RX ADMIN — ACETAMINOPHEN 650 MG: 325 TABLET, FILM COATED ORAL at 23:34

## 2024-03-12 RX ADMIN — TACROLIMUS 1 MG: 1 CAPSULE ORAL at 05:17

## 2024-03-12 RX ADMIN — INSULIN HUMAN 4 UNITS: 100 INJECTION, SOLUTION PARENTERAL at 08:13

## 2024-03-12 RX ADMIN — HEPARIN SODIUM 5000 UNITS: 5000 INJECTION, SOLUTION INTRAVENOUS; SUBCUTANEOUS at 05:18

## 2024-03-12 RX ADMIN — SODIUM BICARBONATE 1300 MG: 650 TABLET ORAL at 18:28

## 2024-03-12 RX ADMIN — INSULIN HUMAN 13 UNITS: 100 INJECTION, SUSPENSION SUBCUTANEOUS at 08:14

## 2024-03-12 RX ADMIN — HEPARIN SODIUM 5000 UNITS: 5000 INJECTION, SOLUTION INTRAVENOUS; SUBCUTANEOUS at 14:00

## 2024-03-12 RX ADMIN — INSULIN HUMAN 7 UNITS: 100 INJECTION, SOLUTION PARENTERAL at 14:01

## 2024-03-12 RX ADMIN — INSULIN HUMAN 7 UNITS: 100 INJECTION, SOLUTION PARENTERAL at 22:03

## 2024-03-12 RX ADMIN — SODIUM BICARBONATE 1300 MG: 650 TABLET ORAL at 09:34

## 2024-03-12 ASSESSMENT — ENCOUNTER SYMPTOMS
SPUTUM PRODUCTION: 0
ABDOMINAL PAIN: 0
SHORTNESS OF BREATH: 0
NAUSEA: 0
VOMITING: 0
HEADACHES: 0
PALPITATIONS: 0
MYALGIAS: 1
CHILLS: 0
WEAKNESS: 0
FEVER: 0
COUGH: 0

## 2024-03-12 ASSESSMENT — PAIN DESCRIPTION - PAIN TYPE
TYPE: ACUTE PAIN
TYPE: ACUTE PAIN

## 2024-03-12 NOTE — PROGRESS NOTES
"[facility] -  PROGRESS NOTE               Author: Johnathan Melendez D.O. 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 Initially started on broad spectrum abx and volume expansion but developed shock promoting transfer to the ICU with initiation of pressors. Underwent IR drain placement on necrotic lesions on 3/7. Stress dose steroids. MMF held, tacrolimus transiently held. Shock resolved, transferred out of ICU. Hem/onc consulted, without plans for inpatient therapy.  Baseline cr appears ~1.8, on FK 1mg BID, MMF, pred at home, notes stable levels for many years. Cr elevated to 2.6 on admission and has remained stable since. UA on 3/7 without blood or protein. Urine output not well documented.  Currently without chest pain, sob. Some mild nausea. No ongoing f/c/s.        DAILY NEPHROLOGY SUMMARY:  03/09 - consult done  03/10 - SCr trending up, CO2 and Na trending down, SBP 100s-110s, UOP not fully recorded, c/o edema, no other new c/o  3/11: Cr continues to rise. Pt feels okay, peeing well. Edema still present  3/12: cr improving, repeat UA bland. Spoke with [facility] transplant last afternoon.     REVIEW OF SYSTEMS:    10 point ROS reviewed and is as per HPI/daily summary or otherwise negative    PMH/PSH/SH/FH: Reviewed and unchanged since admission note  CURRENT MEDICATIONS: Reviewed from admission to present day    VS:  /83   Pulse 81   Temp 36.4 °C (97.6 °F) (Temporal)   Resp 18   Ht 1.956 m (6' 5.01\")   Wt 115 kg (253 lb 8.5 oz)   SpO2 93%   BMI 30.06 kg/m²   Physical Exam  Vitals and nursing note reviewed.   Constitutional:       General: He is not in acute distress.  HENT:      Head: " Normocephalic and atraumatic.      Right Ear: External ear normal.      Left Ear: External ear normal.      Nose: Nose normal.      Mouth/Throat:      Mouth: Mucous membranes are moist.      Pharynx: Oropharynx is clear.   Eyes:      General: No scleral icterus.     Extraocular Movements: Extraocular movements intact.   Cardiovascular:      Rate and Rhythm: Normal rate and regular rhythm.   Pulmonary:      Effort: Pulmonary effort is normal.      Breath sounds: No wheezing.   Abdominal:      General: There is no distension.      Palpations: Abdomen is soft.   Musculoskeletal:      Right lower leg: Edema present.      Left lower leg: Edema present.   Skin:     General: Skin is warm and dry.      Coloration: Skin is not jaundiced.   Neurological:      General: No focal deficit present.      Mental Status: He is alert and oriented to person, place, and time.      Cranial Nerves: No cranial nerve deficit.   Psychiatric:         Mood and Affect: Mood normal.         Behavior: Behavior normal.         Fluids:  In: 1500 [P.O.:1500]  Out: 3060     LABS:  Recent Labs     03/10/24  0022 03/11/24  0058 03/12/24  0451   SODIUM 128* 128* 134*   POTASSIUM 5.2 4.7 4.4   CHLORIDE 98 99 102   CO2 15* 16* 20   GLUCOSE 247* 199* 241*   BUN 71* 77* 80*   CREATININE 2.78* 3.03* 2.93*   CALCIUM 8.0* 7.8* 8.0*              ASSESSMENT:  # SANKET: In setting of septic shock.   -UA bland on 3/11  # Renal Transplant  -Baseline cr appears ~1.8  [place] [facility]  -Previous regimen: FK 1mg BID, MMF 500gm BID, pred 5mg   -discussed active Ca with [facility] on 3/11, who agreed with holding MMF due to Ca, and targeting tacrolimus level 3-5, and cont prednisone  -[facility] recommended avoiding PD-1/check point immunotherapy as high risk of rejection  # Immunosuppressed state  # Septic Shock:   -necrotic skin infection/abscess  -broad spectrum abx, s/p IR drain placement   # Hyponatremia:  # Acidosis   # SCC of the axilla:   -non-resectable.  -Per hem/onc lara  needs immunotherapy/rad therapy.   -would avoid checkpoint (PD-1) immunotherapy as it has a higher risk of causing rejection  -recommend oncology discuss with his transplant center  # Anemia  # PAF  # DM  # CAD  # Thrombocytopenia: chornic     SUGGESTIONS:    -Cr improved slightly today  -Continue tacrolimus 1mg BID, await FK level (goal 3-5 due to Ca)  -hold cellcept indefinitely due to active cancer, per discussion with [facility] transplant ([phone]),  -finish stress dose steroids, can restart prednisone 5mg daily for transplant once completed  -Strict I/Os  -Dose all meds per eGFR   -cont lasix to 60mg IV BID for edema  -may need/consider low dose midodrine if BP is low with lasix  -cont PO sodium bicarb 1300mg TID for acidosis    Please page nephrology with any questions or concerns

## 2024-03-12 NOTE — CARE PLAN
Problem: Pain - Standard  Goal: Alleviation of pain or a reduction in pain to the patient’s comfort goal  Outcome: Progressing     Problem: Knowledge Deficit - Standard  Goal: Patient and family/care givers will demonstrate understanding of plan of care, disease process/condition, diagnostic tests and medications  Outcome: Progressing     Problem: Hemodynamics  Goal: Patient's hemodynamics, fluid balance and neurologic status will be stable or improve  Outcome: Progressing     Problem: Fluid Volume  Goal: Fluid volume balance will be maintained  Outcome: Progressing     Problem: Urinary - Renal Perfusion  Goal: Ability to achieve and maintain adequate renal perfusion and functioning will improve  Outcome: Progressing     Problem: Respiratory  Goal: Patient will achieve/maintain optimum respiratory ventilation and gas exchange  Outcome: Progressing     Problem: Physical Regulation  Goal: Diagnostic test results will improve  Outcome: Progressing  Goal: Signs and symptoms of infection will decrease  Outcome: Progressing     Problem: Fall Risk  Goal: Patient will remain free from falls  Outcome: Progressing   The patient is Stable - Low risk of patient condition declining or worsening    Shift Goals  Clinical Goals: Educate patient regarding fluid restriction; Wound care; NICKI drain management  Patient Goals: Get NICKI drain out  Family Goals: Get NICKI drain out and fight infection    Progress made toward(s) clinical / shift goals:  pt progressing toward goals    Patient is not progressing towards the following goals:

## 2024-03-12 NOTE — PROGRESS NOTES
[facility] Medicine Daily Progress Note    Date of Service  3/12/2024    Chief Complaint  Jama Altman is a 68 y.o. male admitted 3/6/2024 with weakness.    Hospital Course  Jama Altman is a 68 y.o. male w/ a hx of afib recent watchman procedure,  CAD, renal transplant 2010 (hx of polycystic kidney disease), HLD, HTN and PAD.  He was admitted 3/6/2024 with weakness.  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  He had IR place a drain in left axilla 3/7.  A CT scan showed a large left axillary mass with necrosis.  He was hypotensive and received boluses and admitted with septic shock.     S/p IR drain placement 3/7. Repeat shoulder CT 3/11: large, complex left axillary fluid collection. Slightly decreased in size from prior imaging.     Discussed with IR. Given 110mL output overnight; no drain adjustment at this time.     Interval Problem Update  3/12: Per nursing report 110mL out through drain overnight. Patient is up, walking through the halls. No worsening pain. No acute overnight events. Discussed with IR and ID at bedside.     I have discussed this patient's plan of care and discharge plan at IDT rounds today with Case Management, Nursing, Nursing leadership, and other members of the IDT team.    Consultants/Specialty  infectious disease, oncology, and IR    Code Status  Full Code    Disposition  The patient is not medically cleared for discharge to home or a post-acute facility.  Anticipate discharge to: home with close outpatient follow-up    I have placed the appropriate orders for post-discharge needs.    Review of Systems  Review of Systems   Constitutional:  Negative for chills and fever.   Respiratory:  Negative for cough and shortness of breath.    Cardiovascular:  Negative for chest pain.   Gastrointestinal:  Negative for nausea and vomiting.   Musculoskeletal:  Positive for joint pain.        Physical Exam  Temp:  [36.4 °C (97.6 °F)-36.6  °C (97.9 °F)] 36.5 °C (97.7 °F)  Pulse:  [] 83  Resp:  [16-18] 16  BP: (120-134)/(65-86) 134/86  SpO2:  [92 %-96 %] 96 %    Physical Exam  Vitals and nursing note reviewed.   Constitutional:       General: He is not in acute distress.     Appearance: Normal appearance. He is not ill-appearing.   HENT:      Head: Normocephalic.   Cardiovascular:      Rate and Rhythm: Normal rate and regular rhythm.      Heart sounds: Murmur heard.   Pulmonary:      Effort: Pulmonary effort is normal. No respiratory distress.      Breath sounds: Normal breath sounds. No wheezing.   Abdominal:      Tenderness: There is no abdominal tenderness.   Musculoskeletal:         General: Swelling and tenderness present. Normal range of motion.      Cervical back: Normal range of motion. No tenderness.      Comments: NICKI drain to left axilla    Skin:     General: Skin is warm and dry.   Neurological:      Mental Status: He is alert and oriented to person, place, and time.   Psychiatric:         Mood and Affect: Mood normal.         Behavior: Behavior normal.         Fluids    Intake/Output Summary (Last 24 hours) at 3/12/2024 2131  Last data filed at 3/12/2024 1900  Gross per 24 hour   Intake 700 ml   Output 3235 ml   Net -2535 ml       Laboratory  Recent Labs     03/10/24  0022 03/11/24  0058 03/12/24  0451   WBC 12.0* 10.0 8.4   RBC 4.20* 4.13* 4.47*   HEMOGLOBIN 12.0* 11.9* 12.7*   HEMATOCRIT 37.4* 36.0* 38.9*   MCV 89.0 87.2 87.0   MCH 28.6 28.8 28.4   MCHC 32.1* 33.1 32.6   RDW 45.3 44.5 45.0   PLATELETCT 104* 112* 127*   MPV 12.6 11.9 11.8     Recent Labs     03/10/24  0022 03/11/24  0058 03/12/24  0451   SODIUM 128* 128* 134*   POTASSIUM 5.2 4.7 4.4   CHLORIDE 98 99 102   CO2 15* 16* 20   GLUCOSE 247* 199* 241*   BUN 71* 77* 80*   CREATININE 2.78* 3.03* 2.93*   CALCIUM 8.0* 7.8* 8.0*                   Imaging  CT-SHOULDER W/O LEFT   Final Result      1.  Large complex LEFT axillary fluid collection consistent with abscess, with drain  in place, slightly decreased in size from prior.   2.  Overlying skin thickening again seen.      US-EXTREMITY VENOUS UPPER UNILAT LEFT   Final Result      CT-DRAIN-SKIN - SUBCUTANEOUS   Final Result      1. Ultrasound GUIDED PLACEMENT OF A PERCUTANEOUS DRAINAGE CATHETER IN A LEFT AXILLARY FLUID COLLECTION.      2.  THE CURRENT PLAN IS TO MONITOR DRAINAGE OUTPUT AND OBTAIN A FOLLOWUP CT SCAN IN 5-7 DAYS IF CLINICALLY INDICATED.      US-RENAL   Final Result      1.  Normal appearance of the right lower quadrant transplant kidney.   2.  Native left kidney demonstrates no hydronephrosis. There are innumerable cysts with very little normal-appearing parenchyma.   3.  Rounded echogenic masslike area in the inferior bladder. It is uncertain if this is related to an enlarged prostate gland protruding into the bladder base or a bladder mass. Recommend clinical correlation with history of hematuria.      CT-CHEST (THORAX) W/O   Final Result      1.  Partially visualized large left axillary mass and necrotic adenopathy is consistent with known squamous cell carcinoma. Central hypodensity in the larger mass may be related to necrosis.      2.  0.9 cm pleural-based nodule in the right lower lobe is nonspecific, possibly present on the prior exam where there were patchy opacities. Follow-up per oncology protocol      3.  Patchy groundglass density and subsegmental atelectasis appears slightly improved from the prior CT. Findings are consistent with a chronic inflammatory/infectious process.      Fleischner Society pulmonary nodule recommendations:   Not Applicable              Assessment/Plan  * Abscess of axilla, left  Assessment & Plan  Secondary to necrotic squamous cell cancer mass  S/p IR drain placement 3/7  Wound culture positive for  Finegoldia magna, MRSA, Group D Enterococcus.     3/12:  -Discussed with IR and ID   - Drain with 110mL out overnight  - Continue discussions for anbx duration and route     Squamous cell  carcinoma of skin  Assessment & Plan  Of left axilla per biopsy on 2/23/24    -Per oncology -  plan outpatient PET scan, immunotherapy and radiation therapy    Immunosuppressed status (HCC)- (present on admission)  Assessment & Plan  Per [facility] transplant team Dr. Carly Becerril ([phone]) okay to continue tacrolimus for now but hold mycophenolate  - Nephrology following    Acute kidney injury superimposed on chronic kidney disease (HCC)- (present on admission)  Assessment & Plan  BUNs/CR 44/2.60 (baseline Cr ~2.0)  Possibly prerenal from sepsis  U/S renal did not show any hydronephrosis nor any signs of active rejection    3/12:  - Hold cellcept indefinetly due to active cancer  - Continue tacrolimus  - Continue stress steroids; discuss duration with team tomorrow  - Continue IV lasix as blood pressure allows  - Resume home prednisone 5mg after stress steroids  - Nephrology following    Type 2 diabetes mellitus (HCC)- (present on admission)  Assessment & Plan  A1C 2/16/2024 6.9  - Elevated glucose secondary to stress dosing of steroids  - Glucose ranging 200-350s  - Increase NPH to 15 units BID   - Continue sliding scale insulin  - Monitor BG trend.   - Accu-Cheks before meals and at bedtime.   - Goal to keep BG between 140-180 per 2019 ADA guidelines      Thrombocytopenia (HCC)- (present on admission)  Assessment & Plan  Possibly secondary to sepsis  Improving  Daily CBC    History of renal transplant- (present on admission)  Assessment & Plan  2/2 polycystic kidney disease s/p right renal transplant in 2010  - Nephrology following  - Resume Tacrolimus  - Hold Cellcept due to active cancer  - Finish stress dosing of steroids; resume post transplant home prednisone 5mg daily after        Hyponatremia  Assessment & Plan  Sodium 128   follow-up with nephrology recommendation      Adrenal insufficiency (HCC)  Assessment & Plan  Due to chronic steroid use  On hydrocotisone.    ACP (advance care  planning)  Assessment & Plan   The patient is willing to  have all life sustaining efforts. Continue full code    Peripheral neuropathy  Assessment & Plan  Continue gabapentin 400mg BID    Septic shock (HCC)- (present on admission)  Assessment & Plan  Resolved     Presence of Watchman left atrial appendage closure device- (present on admission)  Assessment & Plan  Afib, s/p watchman procedure on 1/9/24 off anticoagulation  Rate controlled    Abdominal aortic aneurysm (AAA) without rupture (HCC)- (present on admission)  Assessment & Plan  History of, 3.2 cm on 1/27/23  Monitor blood pressure  Continue to monitor    Acute hypoxic respiratory failure (HCC)- (present on admission)  Assessment & Plan  Resolved    -Resting comfortably on room air.       AF (atrial fibrillation) (HCC)- (present on admission)  Assessment & Plan  History of, in SR, s/p watchman procedure on 1/2024, off anticoagulation  Metoprolol on hold due to low bp and adrenal insufficiency   Monitor HR    GERD (gastroesophageal reflux disease)- (present on admission)  Assessment & Plan  Continue Omeprazole 20 mg daily    Coronary artery disease- (present on admission)  Assessment & Plan  S/p MI with 2 stents in 2011  Continue to hold aspirin 81 mg daily for ongoing NICKI drain and abscess management  Continue Atorvastatin 80 mg daily  Hold Metoprolol due to hypotension    Hyperlipidemia- (present on admission)  Assessment & Plan  Atorvastatin 80 mg for ongoing active treatment    Primary hypertension- (present on admission)  Assessment & Plan  Hypotension during admission  Continue to hold home metoprolol         VTE prophylaxis:   SCDs/TEDs   pharmacologic prophylaxis contraindicated due to ongoing procedure eval      I have performed a physical exam and reviewed and updated ROS and Plan today (3/12/2024). In review of yesterday's note (3/11/2024), there are no changes except as documented above.

## 2024-03-12 NOTE — PROGRESS NOTES
Radiology Progress Note   Author: LARRY Lujan   Date & Time created: 3/12/2024  11:29 AM   Date of admission  3/6/2024  Note to reader: this note follows the APSO format rather than the historical SOAP format. Assessment and plan located at the top of the note for ease of use.    Chief Complaint  68 y.o. male admitted 3/6/2024 with   Chief Complaint   Patient presents with    Flu Like Symptoms     C/o flu like symptoms since yesterday. + body aches and chills. + lightheadedness.     Wound Check     Also c/o left wound possible infection. 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  He was admitted 03/06/2024 for CT finding of left axillary fluid collection after presenting to the ED for worsening axillary pain.  Patient underwent a left axillary abscess drain placement with IR Dr. Gutierrez on 03/07/2024.    Interval History:   03/08/24 - 12 Fr left axillary abscess drain to Left axilla with 455 mL turbid serosanguineous output in the last 24 hours. WBC 13.9. Cultures pending. On ABX. Drain flushed with 10 mL NS.     03/09/24 - 12 Fr left axillary abscess drain to Left axilla with 35 mL of serosanguineous output in the last 24 hours.  I flushed drain with 10 mL normal saline solution.  Order placed to have RNs irrigate wound with 10 mL NSS 2 times daily; I I reviewed today's labs: WBC 13.8; Hgb 11.8, Cr 2.55; Micro from axillary fluid positive for MRSA. I  Afebrile.    03/10/24 - 12 Fr left axillary abscess drain to Left axilla with 85 mL of serosanguineous output in the last 24 hours.  I flushed drain with 10 mL normal saline solution.  Order placed to have RNs irrigate wound with 10 mL NSS 2 times daily; I I reviewed today's labs: WBC 12.0;  Hgb 12.0, Cr 2.78; FSBS 208; micro from axillary fluid positive for Finegoldia magna, MRSA, enterococcus Faecalis. Afebrile.    03/11/24 - 12 Fr left axillary abscess drain to Left axilla with 5 mL of serosanguineous output in the last 24 hours. Labs reviewed: WBC 10.0; Hgb 11.9, Cr 3.03. Surgery had clamped drain earlier in the day and asked me to unclamp this afternoon and put drain back to suction. No immediate fluid from drain following placing back to suction. Pt discussed with surgery and hospitalist.     03/12/24 - 12 Fr left axillary abscess drain to Left axilla with 110 mL of serosanguineous output in the last 24 hours. Labs reviewed: WBC 8.4; Hgb 12.7, Cr 2.93. Pt discussed with surgery and hospitalist.     Assessment/Plan     Principal Problem:    Abscess of axilla, left  Active Problems:    History of renal transplant    Primary hypertension    Hyperlipidemia    Coronary artery disease    Acute kidney injury superimposed on chronic kidney disease (HCC)    Thrombocytopenia (HCC)    GERD (gastroesophageal reflux disease)    Type 2 diabetes mellitus (HCC)    AF (atrial fibrillation) (HCC)    Acute hypoxic respiratory failure (HCC)    Immunosuppressed status (HCC)    Abdominal aortic aneurysm (AAA) without rupture (HCC)    Presence of Watchman left atrial appendage closure device    Septic shock (HCC)    Peripheral neuropathy    Squamous cell carcinoma of skin    Severe sepsis (HCC)    Axillary mass, left    ACP (advance care planning)    Adrenal insufficiency (HCC)    Hyponatremia      Plan IR  - Continue orders for RNs to irrigate Left axillary drain with 10 ml of sterile saline each shift  - Increase in drain output overnight.   - Cultures positive for Finegoldia magna, MRSA, enterococcus Faecalis  - ABX per ID  - general surgery following   - Continue to monitor drains, VS, and labs      -Thank you for allowing Interventional Radiology team to participate in the patients care, if any additional care or  requests are needed in the future please do not hesitate to call or place IR order   [phone]           Review of Systems  Physical Exam   Review of Systems   Constitutional:  Negative for chills and fever.   Respiratory:  Negative for sputum production and shortness of breath.    Cardiovascular:  Negative for chest pain and palpitations.   Gastrointestinal:  Negative for abdominal pain, nausea and vomiting.   Musculoskeletal:         Left axillary pain (improving)   Neurological:  Negative for weakness and headaches.      Vitals:    03/12/24 0808   BP: 134/83   Pulse: 81   Resp: 18   Temp: 36.4 °C (97.6 °F)   SpO2: 93%        Physical Exam  Vitals and nursing note reviewed.   Constitutional:       General: He is not in acute distress.     Appearance: Normal appearance.   Cardiovascular:      Rate and Rhythm: Normal rate.      Pulses: Normal pulses.   Pulmonary:      Effort: Pulmonary effort is normal. No respiratory distress.   Abdominal:      Palpations: Abdomen is soft.   Musculoskeletal:         General: Tenderness (Left axilla improving) present.      Comments: IR drain to left axilla   Skin:     General: Skin is warm and dry.   Neurological:      General: No focal deficit present.      Mental Status: He is alert and oriented to person, place, and time.   Psychiatric:         Attention and Perception: Attention normal.         Mood and Affect: Mood normal.         Behavior: Behavior normal.             Labs    Recent Labs     03/10/24  0022 03/11/24  0058 03/12/24  0451   WBC 12.0* 10.0 8.4   RBC 4.20* 4.13* 4.47*   HEMOGLOBIN 12.0* 11.9* 12.7*   HEMATOCRIT 37.4* 36.0* 38.9*   MCV 89.0 87.2 87.0   MCH 28.6 28.8 28.4   MCHC 32.1* 33.1 32.6   RDW 45.3 44.5 45.0   PLATELETCT 104* 112* 127*   MPV 12.6 11.9 11.8     Recent Labs     03/10/24  0022 03/11/24  0058 03/12/24  0451   SODIUM 128* 128* 134*   POTASSIUM 5.2 4.7 4.4   CHLORIDE 98 99 102   CO2 15* 16* 20   GLUCOSE 247* 199* 241*   BUN 71* 77* 80*   CREATININE  2.78* 3.03* 2.93*   CALCIUM 8.0* 7.8* 8.0*     Recent Labs     03/10/24  0022 03/11/24  0058 03/12/24  0451   ALBUMIN 3.0*  --   --    CREATININE 2.78* 3.03* 2.93*     CT-SHOULDER W/O LEFT   Final Result      1.  Large complex LEFT axillary fluid collection consistent with abscess, with drain in place, slightly decreased in size from prior.   2.  Overlying skin thickening again seen.      US-EXTREMITY VENOUS UPPER UNILAT LEFT   Final Result      CT-DRAIN-SKIN - SUBCUTANEOUS   Final Result      1. Ultrasound GUIDED PLACEMENT OF A PERCUTANEOUS DRAINAGE CATHETER IN A LEFT AXILLARY FLUID COLLECTION.      2.  THE CURRENT PLAN IS TO MONITOR DRAINAGE OUTPUT AND OBTAIN A FOLLOWUP CT SCAN IN 5-7 DAYS IF CLINICALLY INDICATED.      US-RENAL   Final Result      1.  Normal appearance of the right lower quadrant transplant kidney.   2.  Native left kidney demonstrates no hydronephrosis. There are innumerable cysts with very little normal-appearing parenchyma.   3.  Rounded echogenic masslike area in the inferior bladder. It is uncertain if this is related to an enlarged prostate gland protruding into the bladder base or a bladder mass. Recommend clinical correlation with history of hematuria.      CT-CHEST (THORAX) W/O   Final Result      1.  Partially visualized large left axillary mass and necrotic adenopathy is consistent with known squamous cell carcinoma. Central hypodensity in the larger mass may be related to necrosis.      2.  0.9 cm pleural-based nodule in the right lower lobe is nonspecific, possibly present on the prior exam where there were patchy opacities. Follow-up per oncology protocol      3.  Patchy groundglass density and subsegmental atelectasis appears slightly improved from the prior CT. Findings are consistent with a chronic inflammatory/infectious process.      Fleischner Society pulmonary nodule recommendations:   Not Applicable           INR   Date Value Ref Range Status   03/06/2024 1.34 (H) 0.87 - 1.13  Final     Comment:     INR - Non-therapeutic Reference Range: 0.87-1.13  INR - Therapeutic Reference Range: 2.0-4.0       POC INR   Date Value Ref Range Status   08/03/2022 2.1 (H) 0.9 - 1.2 Final     Comment:     INR - Non-therapeutic Reference Range: 0.9-1.2  INR - Therapeutic Reference Range: 2.0-4.0          Intake/Output Summary (Last 24 hours) at 3/8/2024 0833  Last data filed at 3/8/2024 0655  Gross per 24 hour   Intake 100 ml   Output 1025 ml   Net -925 ml      Labs not explicitly included in this progress note were reviewed by the author. Radiology/imaging not explicitly included in this progress note was reviewed by the author.     I have performed a physical exam and reviewed and updated ROS and Plan today (3/12/2024).     35 minutes in directly providing and coordinating care and extensive data review.  No time overlap and excludes procedures.

## 2024-03-12 NOTE — PROGRESS NOTES
Per latest cardiology note, patient to d/c anticoagulation following successful Watchman device placement.  Pending further contact, will discharge from anticoagulation clinic.  Scotty Faith, PharmD, BCACP

## 2024-03-12 NOTE — PROGRESS NOTES
Infectious Disease Progress Note    Author: Tania Briggs M.D. Date & Time of service: 3/12/2024  11:33 AM    Chief Complaint:  renal transplant  Skin cancer  Wound infection    Interval History:   68 y.o. male with PCKD and DM s/p renal transplant  admitted 3/6/2024 for nonhealing wound left axilla.  3/11 AF WBC 10 possible removal drain today-creat 3  3/12 AF WBC 8.4 minimal output from drain but per repeat CT large complex residual abscess. Platelets up to 127  Labs Reviewed, Medications Reviewed, and Wound Reviewed.    Review of Systems:  Review of Systems   Constitutional:  Negative for fever.   Respiratory:  Negative for cough and shortness of breath.    Gastrointestinal:  Negative for nausea and vomiting.   Musculoskeletal:  Positive for myalgias.   All other systems reviewed and are negative.      Hemodynamics:  Temp (24hrs), Av.4 °C (97.6 °F), Min:36.2 °C (97.2 °F), Max:36.7 °C (98 °F)  Temperature: 36.4 °C (97.6 °F)  Pulse  Av.6  Min: 54  Max: 126   Blood Pressure : 134/83       Physical Exam:  Physical Exam  Vitals and nursing note reviewed.   Constitutional:       General: He is not in acute distress.     Appearance: He is not toxic-appearing or diaphoretic.   Eyes:      General: No scleral icterus.     Extraocular Movements: Extraocular movements intact.      Pupils: Pupils are equal, round, and reactive to light.   Cardiovascular:      Rate and Rhythm: Normal rate.   Pulmonary:      Effort: Pulmonary effort is normal. No respiratory distress.      Breath sounds: No stridor.   Abdominal:      General: There is no distension.   Musculoskeletal:      Comments: Decreased swelling and erythema LUE  Drain thick serosanguinous   Skin:     Coloration: Skin is not jaundiced.      Findings: Bruising present.   Neurological:      General: No focal deficit present.      Mental Status: He is alert and oriented to person, place, and time.         Meds:    Current Facility-Administered Medications:      sodium bicarbonate    gabapentin    furosemide    ampicillin-sulbactam (UNASYN) IV    insulin NPH    hydrocortisone sodium succinate PF    heparin    normal saline flush    insulin regular **AND** POC blood glucose manual result **AND** NOTIFY MD and PharmD **AND** Administer 20 grams of glucose (approximately 8 ounces of fruit juice) every 15 minutes PRN FSBG less than 70 mg/dL **AND** dextrose bolus    linezolid    atorvastatin    [Held by provider] metoprolol SR    [Held by provider] mycophenolate    omeprazole    tacrolimus    acetaminophen    senna-docusate **AND** polyethylene glycol/lytes    ondansetron    ondansetron    Labs:  Recent Labs     03/10/24  0022 24  0058 24  0451   WBC 12.0* 10.0 8.4   RBC 4.20* 4.13* 4.47*   HEMOGLOBIN 12.0* 11.9* 12.7*   HEMATOCRIT 37.4* 36.0* 38.9*   MCV 89.0 87.2 87.0   MCH 28.6 28.8 28.4   RDW 45.3 44.5 45.0   PLATELETCT 104* 112* 127*   MPV 12.6 11.9 11.8     Recent Labs     03/10/24  0022 24  0058 24  0451   SODIUM 128* 128* 134*   POTASSIUM 5.2 4.7 4.4   CHLORIDE 98 99 102   CO2 15* 16* 20   GLUCOSE 247* 199* 241*   BUN 71* 77* 80*     Recent Labs     03/10/24  0022 24  0058 24  0451   ALBUMIN 3.0*  --   --    CREATININE 2.78* 3.03* 2.93*       Imaging:  US-EXTREMITY VENOUS UPPER UNILAT LEFT    Result Date: 3/8/2024   Upper Extremity  Venous Duplex Report  Vascular Laboratory  CONCLUSIONS  1.  No left upper extremity DVT or SVT.  2.  There is subcutaneous edema.  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  Limitations:  PROCEDURES:  Left upper extremity venous duplex imaging.  The following venous structures were evaluated: internal jugular,  subclavian, axillary, brachial, cephalic, and basilic veins.  Serial compression, color, and spectral Doppler flow evaluations were  performed.  FINDINGS:  Left upper extremity.  All veins demonstrate complete color filling and compressibility with  normal venous flow dynamics including spontaneous flow and respiratory  phasicity.  No superficial or deep venous thrombosis.  Flow was evaluated in the contralateral subclavian vein and normal venous  flow dynamics including spontaneous flow and respiratory phasic variation  were demonstrated.  Johnna Ftich  (Electronically Signed)  Final Date:      08 March 2024                   10:30    CT-DRAIN-SKIN - SUBCUTANEOUS    Result Date: 3/8/2024  3/7/2024 11:56 AM HISTORY/REASON FOR EXAM: Left axillary fluid collection. TECHNIQUE/EXAM DESCRIPTION: Left axillary abscess drainage with ultrasound guidance. PROCEDURE: Informed consent was obtained. Localizing ultrasound images were obtained with the patient in supine position. The skin was prepped with ChloraPrep and draped in a sterile fashion. Sedation was not administered. Intraservice time was 15 minutes. Following local anesthesia with 1% Lidocaine, a 17 G guiding needle was placed and needle path confirmed with CT. An Amplatz guidewire was placed and following serial tract dilatation, a 12 [place] pigtail locking catheter was placed. A specimen was collected and submitted for culture and sensitivity and Gram stain. Total of 420 mL bloody malodorous fluid was drained. The catheter was secured to the skin and connected to suction bulb drainage. Final ultrasound  images were obtained documenting catheter position. The patient tolerated the procedure well with no evidence of complication. COMPARISON: None available. FINDINGS: The final ultrasound images show satisfactory catheter position within the target collection.     1. Ultrasound GUIDED PLACEMENT OF A PERCUTANEOUS DRAINAGE CATHETER IN A LEFT AXILLARY FLUID COLLECTION. 2.  THE CURRENT PLAN IS TO MONITOR DRAINAGE OUTPUT AND OBTAIN A FOLLOWUP CT SCAN IN 5-7 DAYS IF CLINICALLY INDICATED.    US-RENAL    Result Date: 3/7/2024  3/7/2024 9:21 AM HISTORY/REASON FOR EXAM:  Abnormal Labs TECHNIQUE/EXAM DESCRIPTION: Renal ultrasound. COMPARISON:  None FINDINGS: The right lower quadrant transplant kidney measures 11.96 cm.  The right kidney appears normal in contour and parenchymal echotexture. The corticomedullary differentiation is preserved. The right renal collecting system is not dilated. No hydronephrosis.  There are no renal calculi. The left native kidney measures 14.77 cm. There are innumerable cysts. There is very little normal-appearing parenchyma in the left kidney. The left renal collecting system is not dilated. No hydronephrosis. There are no renal calculi. The bladder demonstrates a rounded echogenic masslike focus with some minimal peripheral vascularity at the base abutting the wall of the inferior bladder measuring 1.7 x 1.9 x 1.7 cm. Bladder volume is 280 mL. The patient is unable to void during the examination.     1.  Normal appearance of the right lower quadrant transplant kidney. 2.  Native left kidney demonstrates no hydronephrosis. There are innumerable cysts with very little normal-appearing parenchyma. 3.  Rounded echogenic masslike area in the inferior bladder. It is uncertain if this is related to an enlarged prostate gland protruding into the bladder base or a bladder mass. Recommend clinical correlation with history of hematuria.    CT-CHEST (THORAX) W/O    Result Date: 3/6/2024  3/6/2024 8:42 PM  HISTORY/REASON FOR EXAM:  L axilla squamous cell cancer, now with signs of infection. H/O CKD and renal transplant. TECHNIQUE/EXAM DESCRIPTION: CT scan of the chest without contrast. Noncontrast helical scanning of the chest was obtained from the lung apices through the adrenal glands. Low dose optimization technique was utilized for this CT exam including automated exposure control and adjustment of the mA and/or kV according to patient size. COMPARISON: 5/24/2022 FINDINGS: Lungs: Patchy groundglass density and subsegmental atelectasis, slightly improved from the prior CT. A small pleural-based nodule in the right lower lobe measures approximately 0.9 cm. Mediastinum/Isadora: No significant adenopathy. Pleura: No pleural effusion. Cardiac: Mild cardiomegaly. A watchman's device is in the left atrial appendage. There are coronary artery calcifications. Vascular: Nonaneurysmal aorta with scattered arterial calcifications. Soft tissues: A partially visualized large left axillary mass measures at least 6.6 x 11.3 cm. There is an enlarged necrotic left axillary lymph node measuring approximately 2 cm short axis. There is stranding in the adjacent soft tissues. The overlying skin is only partially visualized but appears thickened. Upper abdomen: There are innumerable hypodense and hyperdense cysts in the partially visualized left kidney Bones: No suspicious bony lesions.     1.  Partially visualized large left axillary mass and necrotic adenopathy is consistent with known squamous cell carcinoma. Central hypodensity in the larger mass may be related to necrosis. 2.  0.9 cm pleural-based nodule in the right lower lobe is nonspecific, possibly present on the prior exam where there were patchy opacities. Follow-up per oncology protocol 3.  Patchy groundglass density and subsegmental atelectasis appears slightly improved from the prior CT. Findings are consistent with a chronic inflammatory/infectious process. [employer] pulmonary nodule recommendations: Not Applicable     EC-RADHA W/O CONT    Result Date: 2024  Transesophageal Echo Report Echocardiography Laboratory CONCLUSIONS The watchman device is well seated within the left atrial appendage without leakage. CIARA FORRESTER Exam Date:          2024                     10:58 Exam Location:      Inpatient Priority:            Routine Ordering Physician:        NAYELI CHAPMAN                             ([id number]) Referring Physician:       ADELA Killian Sonographer:               Gloria Hill [facility] Age:    68     Gender:    M MRN:    [id number] :    1955 BSA:           Ht (in):           Wt (lb): Report Type:      Complete Indications:     Arrhythmia ICD Codes:       427.9 CPT Codes:       92289 BP:          /          HR: Technical Quality:       Fair MEASUREMENTS  (Male / Female) Normal Values * Indicates values subject to auto-interpretation LV EF:        % Medications Limitations Complications Proc. Components Epiq probe #  [id number] was used for this procedure. The probe was inserted and manipulated by To MD. FINDINGS Left Ventricle Right Ventricle Right Atrium Left Atrium LA Appendage The watchman device is well seated within the left atrial appendage without leakage. IA Septum IV Septum Mitral Valve Aortic Valve Tricuspid Valve Pulmonic Valve Pericardium Aorta Jaspreet N To (Electronically Signed) Final Date:     2024                 11:47    IR-NEEDLE BX-LYMPH NODE    Result Date: 2024 8:46 AM HISTORY/REASON FOR EXAM:  Left axillary mass Procedure: After the informed consent the patient was placed on the ultrasound table and supine position. Ultrasound examination of the left axilla demonstrates a large necrotic lesion. Subsequently, after injecting lidocaine, a 19-gauge needle was advanced into the fluid collection. An approximately 250 mL of fluid was collected and sent it to the cytology and microbiology. Subsequently 2 core  biopsies were obtained using 18-gauge biopsy needle. The patient tolerated procedure without any immediate complication.     Ultrasound-guided aspiration and biopsy of left axillary necrotic lesion.    CT-SHOULDER WITH PLUS RECONS LEFT    Result Date: 2/20/2024 2/19/2024 4:29 PM HISTORY/REASON FOR EXAM:  Squamous cell carcinoma of the skin of left upper limb, including shoulder. TECHNIQUE/ EXAM DESCRIPTION AND NUMBER OF VIEWS:  CT scan of the LEFT shoulder with contrast with reconstructions. Axial spiral CT images were obtained of the upper extremity from the shoulder through the proximal third of the humerus. Images were reviewed at soft tissue and bone windows with administration of 165 mL of Omnipaque 350 nonionic contrast without complication. Sagittal reformations were then generated. Up to date radiation dose reduction adjustments have been utilized to meet ALARA standards for radiation dose reduction. COMPARISON: CTA chest 5/24/2022 FINDINGS: There is a 10 x 10 cm peripherally enhancing mass in the left axilla on image 79 series 2. This abuts the chest wall and dermis. There are a few adjacent small similar-appearing masses in the high left axilla on images 48, 56, and 62. Largest of these additional similar-appearing masses measures 2.3 cm. There is a new 6 mm left upper lobe nodule image 217 series 2. Mild scarring in the left lung. Mild DJD of the left glenohumeral joint and AC joint. No focal osseous lesions seen.     1. 10 x 10 cm left axillary mass, which could represent metastatic adenopathy or primary mass. 2. There are a few adjacent similar-appearing left axillary masses which are consistent with adenopathy, largest 2.3 cm. 3. 6 mm indeterminate left upper lobe pulmonary nodule. Recommend formal chest CT follow-up. 4. Mild DJD of the AC joint and glenohumeral joint.      Micro:  Results       Procedure Component Value Units Date/Time    Blood Culture - Draw one from central line and one from  peripheral site [[id number]] Collected: 03/06/24 2227    Order Status: Completed Specimen: Blood from Line Updated: 03/12/24 0100     Significant Indicator NEG     Source BLD     Site Peripheral     Culture Result No growth after 5 days of incubation.    Narrative:      Blood Cultures X2. Blood Cultures X2. Draw one blood culture  from central line and one blood culture peripherally. If no  line present, draw blood cultures times two peripherally from  different sites.  No site indicated    Blood Culture - Draw one from central line and one from peripheral site [[id number]] Collected: 03/06/24 2025    Order Status: Completed Specimen: Blood from Peripheral Updated: 03/11/24 2300     Significant Indicator NEG     Source BLD     Site PERIPHERAL     Culture Result No growth after 5 days of incubation.    Narrative:      Blood Cultures X2. Draw one blood culture from central line  and one blood culture peripherally. If no line present, draw  blood cultures times two peripherally from different sites.  No site indicated    URINALYSIS [[id number]]  (Abnormal) Collected: 03/11/24 1843    Order Status: Completed Specimen: Urine, Clean Catch Updated: 03/11/24 1904     Color Yellow     Character Clear     Specific Gravity 1.008     Ph 5.0     Glucose 100 mg/dL      Ketones Negative mg/dL      Protein Negative mg/dL      Bilirubin Negative     Urobilinogen, Urine 0.2     Nitrite Negative     Leukocyte Esterase Negative     Occult Blood Negative     Micro Urine Req see below     Comment: Microscopic examination not performed when specimen is clear  and chemically negative for protein, blood, leukocyte esterase  and nitrite.         Narrative:      Contact  Collected By: [id number] MIROSLAVA CHRISTIANSON    URINALYSIS [[id number]]     Order Status: Canceled Specimen: Urine     Anaerobic Culture [[id number]]  (Abnormal) Collected: 03/07/24 1206    Order Status: Completed Specimen: Wound Updated: 03/10/24 1358     Significant Indicator POS      Source WND     Site L Axillary Fluid     Culture Result -      Finegoldia magna  Heavy growth      Narrative:      Left axillary fluid aspirate  Left axillary fluid aspirate    CULTURE WOUND W/ GRAM STAIN [[id number]]  (Abnormal)  (Susceptibility) Collected: 03/07/24 1206    Order Status: Completed Specimen: Wound Updated: 03/10/24 1356     Significant Indicator POS     Source WND     Site L Axillary Fluid     Culture Result -     Gram Stain Result Rare WBCs.  Moderate Gram positive cocci.       Culture Result Methicillin Resistant Staphylococcus aureus  Heavy growth  This isolate is presumed to be clindamycin resistant based on  detection of inducible resistance.        Enterococcus faecalis  Heavy growth      Narrative:      Left axillary fluid aspirate  Left axillary fluid aspirate    Susceptibility       Methicillin resistant staphylococcus aureus (1)       Antibiotic Interpretation Microscan   Method Status    Clindamycin Resistant 0.5 mcg/mL CESIA Final    Cefazolin Resistant <=8 mcg/mL CESIA Final    Cefepime Resistant 8 mcg/mL CESIA Final    Daptomycin Sensitive <=0.5 mcg/mL CESIA Final    Erythromycin Resistant >4 mcg/mL CESIA Final    Ampicillin/sulbactam Resistant <=8/4 mcg/mL CESIA Final    Linezolid Sensitive 2 mcg/mL CESIA Final    Oxacillin Resistant >2 mcg/mL CESIA Final    Trimeth/Sulfa Sensitive <=0.5/9.5 mcg/mL CESIA Final    Tetracycline Resistant >8 mcg/mL CESIA Final    Vancomycin Sensitive 1 mcg/mL CESIA Final              Enterococcus faecalis (2)       Antibiotic Interpretation Microscan   Method Status    Ampicillin Sensitive <=2 mcg/mL CESIA Final    Daptomycin Sensitive 2 mcg/mL CESIA Final    Linezolid Sensitive 2 mcg/mL CESIA Final    Tetracycline Resistant >8 mcg/mL CESIA Final    Vancomycin Sensitive 1 mcg/mL CESIA Final    Gent Synergy Sensitive <=500 mcg/mL CESIA Final    Penicillin Sensitive 2 mcg/mL CESIA Final                       Urine Culture (New) [[id number]] Collected: 03/07/24 1733    Order Status: Completed  Specimen: Urine Updated: 03/09/24 0833     Significant Indicator NEG     Source UR     Site -     Culture Result No growth at 48 hours.    Narrative:      Indication for culture:->Emergency Room Patient  Indication for culture:->Emergency Room Patient    GRAM STAIN [[id number]] Collected: 03/07/24 1206    Order Status: Completed Specimen: Wound Updated: 03/07/24 2031     Significant Indicator .     Source WND     Site L Axillary Fluid     Gram Stain Result Rare WBCs.  Moderate Gram positive cocci.      Narrative:      Left axillary fluid aspirate  Left axillary fluid aspirate    Urinalysis [[id number]]  (Abnormal) Collected: 03/07/24 1733    Order Status: Completed Specimen: Urine Updated: 03/07/24 1755     Color Yellow     Character Clear     Specific Gravity 1.017     Ph 5.0     Glucose Negative mg/dL      Ketones Trace mg/dL      Protein Negative mg/dL      Bilirubin Negative     Urobilinogen, Urine 0.2     Nitrite Negative     Leukocyte Esterase Negative     Occult Blood Negative     Micro Urine Req see below     Comment: Microscopic examination not performed when specimen is clear  and chemically negative for protein, blood, leukocyte esterase  and nitrite.         Narrative:      If not done within the last 24 hours    FLUID CULTURE W/GRAM STAIN [[id number]] Collected: 03/07/24 1206    Order Status: Canceled Specimen: Other Body Fluid     Aerobic/Anaerobic Culture (Surgery) [[id number]] Collected: 03/07/24 1206    Order Status: Canceled Specimen: Other Body Fluid     MRSA By PCR (Amp) [[id number]] Collected: 03/07/24 0215    Order Status: Completed Specimen: Respirate from Nares Updated: 03/07/24 1140     MRSA by PCR POSITIVE    Narrative:      If not done within the last 24 hours            Assessment:  Active Hospital Problems    Diagnosis     *Abscess of axilla, left [L02.412]     ACP (advance care planning) [Z71.89]     Adrenal insufficiency (HCC) [E27.40]     Axillary mass, left [R22.32]     Septic shock (HCC)  [A41.9, R65.21]     Peripheral neuropathy [G62.9]     Squamous cell carcinoma of skin [C44.92]     Severe sepsis (HCC) [A41.9, R65.20]     Presence of Watchman left atrial appendage closure device [Z95.818]     Abdominal aortic aneurysm (AAA) without rupture (HCC) [I71.40]     Immunosuppressed status (HCC) [D84.9]     Acute hypoxic respiratory failure (HCC) [J96.01]     AF (atrial fibrillation) (HCC) [I48.91]     Type 2 diabetes mellitus (HCC) [E11.9]     Thrombocytopenia (HCC) [D69.6]     GERD (gastroesophageal reflux disease) [K21.9]     Primary hypertension [I10]     Acute kidney injury superimposed on chronic kidney disease (HCC) [N17.9, N18.9]     Coronary artery disease [I25.10]     Hyperlipidemia [E78.5]     History of renal transplant [Z94.0]      SCCA of the left chest/axilla  Wound infection with abscess after biopsy  -polymicrobial MRSA, Efaecalis, Fingoldia  -s/p drain 3/7  -CT 3/11 : Irregular fluid collection in the LEFT axillary subcutaneous soft tissues measuring 8.3 x 5.1 x 11.1 cm with gas bubbles and drainage catheter in place.   SANKET  S/p renal transplant-Nephrology eval appreciated  Thrombocytopenia      PLAN:   Continue Zyvox for MRSA  Continue Unasyn for treatment Efaecalis and Fingoldia  Adjust abx for renal insuff  Monitor CBC on Zyvox-has thrombocytopenia  Drain per IR  Plan to re-eval for possibility of surgical drainage if cannot be managed more conservatively by IR-has known poor wound healing  Anticipate 10-14 days course antibiotics from date of adequate drainage  Plan to adjust immunosuppressives if needed due to need for XRT/treatment SCCA     Midline: No- oral options are available

## 2024-03-12 NOTE — DISCHARGE PLANNING
HTH/SCP TCN chart review completed. Collaborated with DWAINE Lundberg. Current discharge considerations are for Home with close outpatient f/u's when medically cleared.  Per CM, possible plan for IR to reposition drain.  Please see ID note by Dr. Briggs on 3/12/24 at 11:33 AM and Nephrology note on 3/12/24 at 12:18 PM.  TCN will continue to follow and collaborate with discharge planning team as additional post acute needs arise. Thank you.    Completed:  Choice obtained: None.  See above.   SCP with [facility] P[facility].  Refered to schedulers for Follow up appointment.  F/u scheduled for 3-20-24 at 4:20 PM..

## 2024-03-12 NOTE — WOUND TEAM
[facility] Wound & Ostomy Care  Inpatient Services  Wound and Skin Care Follow-up    Admission Date: 3/6/2024     Last order of IP CONSULT TO WOUND CARE was found on 3/8/2024 from Hospital Encounter on 3/6/2024     HPI, PMH, SH: Reviewed    Past Surgical History:   Procedure Laterality Date    STENT PLACEMENT  2013    cardiac    KNEE MANIPULATION  02/16/2012    Performed by LATOYA CONNER at SURGERY [facility]    KNEE UNICOMPARTMENTAL  12/23/2011    Performed by LATOYA CONNER at SURGERY [facility] ORS    KNEE ARTHROSCOPY  12/23/2011    Performed by LATOYA CONNER at SURGERY [facility] ORS    KNEE ARTHROSCOPY  05/03/2011    Performed by HANANE GOLDMAN at SURGERY SAME DAY [facility]    MENISCECTOMY, KNEE, MEDIAL  05/03/2011    Performed by HANANE GOLDMAN at SURGERY SAME DAY [facility] ORS    OTHER  09/10/2010    RIGHT KIDNEY TRANSPLANT    KNEE ARTHROPLASTY TOTAL  01/12/2007    RIGHT    KNEE ARTHROSCOPY  04/10/2006    RIGHT    OTHER ORTHOPEDIC SURGERY  07/08/1974    LEFT KNEE DEBRIDEMENT     Social History     Tobacco Use    Smoking status: Never     Passive exposure: Never    Smokeless tobacco: Never   Substance Use Topics    Alcohol use: No     Chief Complaint   Patient presents with    Flu Like Symptoms     C/o flu like symptoms since yesterday. + body aches and chills. + lightheadedness.     Wound Check     Also c/o left wound possible infection. Skin cancer ( left sided near axilla )      Diagnosis: Sepsis (HCC) [A41.9]  Severe sepsis (HCC) [A41.9, R65.20]  Axillary mass, left [R22.32]    Unit where seen by Wound Team: R325/00     WOUND FOLLOW UP RELATED TO:  ONDINA Axillary        WOUND TEAM PLAN OF CARE - Frequency of Follow-up:   Nursing to follow dressing orders written for wound care. Contact wound team if area fails to progress, deteriorates or with any questions/concerns if something comes up before next scheduled follow up (See below as to whether wound is following and frequency of wound follow  up)  Dressing changes by wound team:                   Not following, consult as needed  -      WOUND HISTORY:       Pt is a 69 yo male presenting with a worsening wound at L axilla and flu like sumptoms.  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  Site failed to heal following biopsy.  Hx includes kidney transplant on steroids, DM, thrombocytopenia.  Pt was to follow up with oncology 3/8/24.  Pt states he has been using neomycin on site, the site has bled off and on, the satellite wounds are newer.      11/2023 US found fluid collection L axilla   2/2024  CT found complex mass L axilla        WOUND ASSESSMENT/LDA  Wound 03/06/24 Axilla Lateral Left (Active)   Date First Assessed/Time First Assessed: 03/06/24 1146   Present on Original Admission: Yes  Hand Hygiene Completed: Yes  Location: Axilla  Wound Orientation: Lateral  Laterality: Left      Assessments 3/12/2024 10:00 AM   Wound Image     Site Assessment Yellow;Brown;Red   Periwound Assessment Red;Induration   Margins Undefined edges;Attached edges   Closure Adhesive bandage   Drainage Amount Small   Drainage Description Yellow   Treatments Cleansed;Nonselective debridement;Site care   Wound Cleansing Approved Wound Cleanser   Periwound Protectant No-sting Skin Prep   Dressing Status Intact;Dry;Clean   Dressing Changed New   Dressing Cleansing/Solutions Not Applicable   Dressing Options Petrolatum Gauze (yellow);Silicone Adhesive Foam   Dressing Change/Treatment Frequency Daily, and As Needed   NEXT Dressing Change/Treatment Date 03/13/24   NEXT Weekly Photo (Inpatient Only) 03/19/24   Wound Team Following Not following   Shape irregular   Wound Odor None   WOUND NURSE ONLY - Time Spent with Patient (mins) 60        Vascular:    MIRELLA:   No results found.    Lab Values:    Lab Results   Component Value Date/Time    WBC 8.4 03/12/2024 04:51 AM    RBC 4.47 (L) 03/12/2024 04:51 AM    HEMOGLOBIN 12.7 (L) 03/12/2024 04:51 AM    HEMATOCRIT  38.9 (L) 03/12/2024 04:51 AM    CREACTPROT 31.27 (H) 03/08/2024 02:30 AM    SEDRATEWES <1 08/19/2020 07:16 AM    HBA1C 6.9 (H) 02/16/2024 08:14 AM    HBA1C 6.9 (A) 02/16/2024 08:14 AM         Culture Results show:  Recent Results (from the past 720 hour(s))   CULTURE WOUND W/ GRAM STAIN    Collection Time: 03/07/24 12:06 PM    Specimen: Wound   Result Value Ref Range    Significant Indicator POS (POS)     Source WND     Site L Axillary Fluid     Culture Result - (A)     Gram Stain Result Rare WBCs.  Moderate Gram positive cocci.       Culture Result (A)      Methicillin Resistant Staphylococcus aureus  Heavy growth  This isolate is presumed to be clindamycin resistant based on  detection of inducible resistance.      Culture Result Enterococcus faecalis  Heavy growth   (A)        Susceptibility    Enterococcus faecalis - CESIA     Ampicillin <=2 Sensitive mcg/mL     Daptomycin 2 Sensitive mcg/mL     Linezolid 2 Sensitive mcg/mL     Tetracycline >8 Resistant mcg/mL     Vancomycin 1 Sensitive mcg/mL     Gent Synergy <=500 Sensitive mcg/mL     Penicillin 2 Sensitive mcg/mL    Methicillin resistant staphylococcus aureus - CESIA     Clindamycin 0.5 Resistant mcg/mL     Cefazolin <=8 Resistant mcg/mL     Cefepime 8 Resistant mcg/mL     Daptomycin <=0.5 Sensitive mcg/mL     Erythromycin >4 Resistant mcg/mL     Ampicillin/sulbactam <=8/4 Resistant mcg/mL     Linezolid 2 Sensitive mcg/mL     Oxacillin >2 Resistant mcg/mL     Trimeth/Sulfa <=0.5/9.5 Sensitive mcg/mL     Tetracycline >8 Resistant mcg/mL     Vancomycin 1 Sensitive mcg/mL       Pain Level/Medicated:  None, Tolerated without pain medication       INTERVENTIONS BY WOUND TEAM:  Chart and images reviewed. Discussed with bedside RN. All areas of concern (based on picture review, LDA review and discussion with bedside RN) have been thoroughly assessed. Documentation of areas based on significant findings. This RN in to assess patient. Performed standard wound care which  includes appropriate positioning, dressing removal and non-selective debridement. Pictures and measurements obtained weekly if/when required.    Wound:  L. Axillary   Cleansed/Non-selectively Debrided with:  Wound cleanser and Gauze  Nadeen wound: Cleansed with Wound cleanser and Gauze, Prepped with No Sting  Primary Dressing:  xeroform yellow applied over wounds and underneath IR drain  Secondary (Outer) Dressing: fenestrated a silicone adhesive foam brought around tubing.     Advanced Wound Care Discharge Planning  Number of Clinicians necessary to complete wound care: 1  Is patient requiring IV pain medications for dressing changes:  No   Length of time for dressing change 25 min. (This does not include chart review, pre-medication time, set up, clean up or time spent charting.)    Interdisciplinary consultation: Patient, Bedside RN (Soren), N/A.  Pressure injury and staging reviewed with N/A.    EVALUATION / RATIONALE FOR TREATMENT:     Date:  03/12/24  Wound Status:  Wound progressing as expected    Skin assessed to have signs of reduced swelling and per patient his swelling has significantly gone down.  Wound beds do not have anymore odor.  Changed dressing to xeroform yellow, xeroform (yellow) applied to provide an occlusive dressing that incorporates bacteriostatic protection.    Date:  03/07/24  Wound Status:  Initial evaluation    By pt report the L axillary wound has been worsening with swelling, drainage and pain.  The site of the biopsy is currently dry but pt states that at times it has bled.  The wound bed looks like a combination of dried blood and some eschar.  There are satellite wounds that are draining slighly with yellow and red tissue and may represent tissue breakdown related to increasing edema/fluid collection.  There are also some small nodule appearing lesions in the tissue surrounding main wound.  Because of the cancer history the wound POC will be to minimize topical bacteria with dakins  solution and advance plan as needed.           Goals: Steady decrease in wound area and depth weekly.    NURSING PLAN OF CARE ORDERS:  Dressing changes: See Dressing Care orders    NUTRITION RECOMMENDATIONS   Wound Team Recommendations:  N/A     DIET ORDERS (From admission to next 24h)       Start     Ordered    03/11/24 0827  Diet Order Diet: Cardiac  ALL MEALS        Question:  Diet:  Answer:  Cardiac    03/11/24 0826                    PREVENTATIVE INTERVENTIONS:   Q shift Javad - performed per nursing policy  Q shift pressure point assessments - performed per nursing policy    Surface/Positioning  Standard/trauma mattress - Currently in Place    Mobilization      Up to chair     Anticipated discharge plans:  TBD        Vac Discharge Needs:  Vac Discharge plan is purely a recommendation from wound team and not a requirement for discharge unless otherwise stated by physician.  Not Applicable Pt not on a wound vac

## 2024-03-12 NOTE — HOSPITAL COURSE
Jama Altman is a 68 y.o. male w/ a hx of afib recent watchman procedure,  CAD, renal transplant 2010 (hx of polycystic kidney disease), HLD, HTN and PAD.  He was admitted 3/6/2024 with weakness.  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  He had IR place a drain in left axilla 3/7.  A CT scan showed a large left axillary mass with necrosis.  He was hypotensive and received boluses and admitted with septic shock.     S/p IR drain placement 3/7. Repeat shoulder CT 3/11: large, complex left axillary fluid collection. Slightly decreased in size from prior imaging.     IR repositioned and up sized drain on 3/14.  Patient will discharge home on 3/16 with drain in place.  He will follow-up with infectious disease outpatient for drain management and ongoing antibiotics.  Ultimately, patient may require surgical intervention for necrotic tissue that is present.  I advised patient to follow-up with [facility] within the next week. Patient will also need to follow up with [facility].    Patient will be discharged home on p.o. Augmentin and p.o. linezolid.  At this time there is no end date due to ongoing abscess management.    Home health has been ordered to help with drain management.

## 2024-03-13 LAB
ANION GAP SERPL CALC-SCNC: 14 MMOL/L (ref 7–16)
BUN SERPL-MCNC: 70 MG/DL (ref 8–22)
CALCIUM SERPL-MCNC: 8.2 MG/DL (ref 8.5–10.5)
CHLORIDE SERPL-SCNC: 102 MMOL/L (ref 96–112)
CO2 SERPL-SCNC: 22 MMOL/L (ref 20–33)
CREAT SERPL-MCNC: 2.45 MG/DL (ref 0.5–1.4)
ERYTHROCYTE [DISTWIDTH] IN BLOOD BY AUTOMATED COUNT: 44.3 FL (ref 35.9–50)
GFR SERPLBLD CREATININE-BSD FMLA CKD-EPI: 28 ML/MIN/1.73 M 2
GLUCOSE BLD STRIP.AUTO-MCNC: 131 MG/DL (ref 65–99)
GLUCOSE BLD STRIP.AUTO-MCNC: 243 MG/DL (ref 65–99)
GLUCOSE BLD STRIP.AUTO-MCNC: 266 MG/DL (ref 65–99)
GLUCOSE SERPL-MCNC: 200 MG/DL (ref 65–99)
HCT VFR BLD AUTO: 37.6 % (ref 42–52)
HGB BLD-MCNC: 12.7 G/DL (ref 14–18)
MCH RBC QN AUTO: 29.1 PG (ref 27–33)
MCHC RBC AUTO-ENTMCNC: 33.8 G/DL (ref 32.3–36.5)
MCV RBC AUTO: 86 FL (ref 81.4–97.8)
MYCOPHENOLATE SERPL LC/MS/MS-MCNC: <0.5 UG/ML (ref 1–3.5)
MYCOPHENOLATE-G SERPL LC/MS/MS-MCNC: 42.9 UG/ML (ref 35–100)
PLATELET # BLD AUTO: 130 K/UL (ref 164–446)
PMV BLD AUTO: 10.9 FL (ref 9–12.9)
POTASSIUM SERPL-SCNC: 3.9 MMOL/L (ref 3.6–5.5)
RBC # BLD AUTO: 4.37 M/UL (ref 4.7–6.1)
SODIUM SERPL-SCNC: 138 MMOL/L (ref 135–145)
WBC # BLD AUTO: 8.8 K/UL (ref 4.8–10.8)

## 2024-03-13 PROCEDURE — 36415 COLL VENOUS BLD VENIPUNCTURE: CPT

## 2024-03-13 PROCEDURE — 700111 HCHG RX REV CODE 636 W/ 250 OVERRIDE (IP): Performed by: HOSPITALIST

## 2024-03-13 PROCEDURE — 700111 HCHG RX REV CODE 636 W/ 250 OVERRIDE (IP): Mod: JZ | Performed by: STUDENT IN AN ORGANIZED HEALTH CARE EDUCATION/TRAINING PROGRAM

## 2024-03-13 PROCEDURE — 99233 SBSQ HOSP IP/OBS HIGH 50: CPT | Performed by: STUDENT IN AN ORGANIZED HEALTH CARE EDUCATION/TRAINING PROGRAM

## 2024-03-13 PROCEDURE — A9270 NON-COVERED ITEM OR SERVICE: HCPCS | Performed by: STUDENT IN AN ORGANIZED HEALTH CARE EDUCATION/TRAINING PROGRAM

## 2024-03-13 PROCEDURE — 700105 HCHG RX REV CODE 258: Performed by: INTERNAL MEDICINE

## 2024-03-13 PROCEDURE — 700111 HCHG RX REV CODE 636 W/ 250 OVERRIDE (IP): Mod: JZ | Performed by: HOSPITALIST

## 2024-03-13 PROCEDURE — 99024 POSTOP FOLLOW-UP VISIT: CPT | Performed by: SURGERY

## 2024-03-13 PROCEDURE — A9270 NON-COVERED ITEM OR SERVICE: HCPCS | Performed by: INTERNAL MEDICINE

## 2024-03-13 PROCEDURE — 700102 HCHG RX REV CODE 250 W/ 637 OVERRIDE(OP): Performed by: STUDENT IN AN ORGANIZED HEALTH CARE EDUCATION/TRAINING PROGRAM

## 2024-03-13 PROCEDURE — 82962 GLUCOSE BLOOD TEST: CPT | Mod: 91

## 2024-03-13 PROCEDURE — 700101 HCHG RX REV CODE 250: Performed by: NURSE PRACTITIONER

## 2024-03-13 PROCEDURE — 700102 HCHG RX REV CODE 250 W/ 637 OVERRIDE(OP): Performed by: INTERNAL MEDICINE

## 2024-03-13 PROCEDURE — 80048 BASIC METABOLIC PNL TOTAL CA: CPT

## 2024-03-13 PROCEDURE — 85027 COMPLETE CBC AUTOMATED: CPT

## 2024-03-13 PROCEDURE — 770004 HCHG ROOM/CARE - ONCOLOGY PRIVATE *

## 2024-03-13 PROCEDURE — 700111 HCHG RX REV CODE 636 W/ 250 OVERRIDE (IP): Mod: JZ | Performed by: INTERNAL MEDICINE

## 2024-03-13 PROCEDURE — 700105 HCHG RX REV CODE 258: Performed by: STUDENT IN AN ORGANIZED HEALTH CARE EDUCATION/TRAINING PROGRAM

## 2024-03-13 RX ORDER — FUROSEMIDE 10 MG/ML
60 INJECTION INTRAMUSCULAR; INTRAVENOUS
Status: DISCONTINUED | OUTPATIENT
Start: 2024-03-13 | End: 2024-03-13

## 2024-03-13 RX ORDER — PREDNISONE 10 MG/1
5 TABLET ORAL DAILY
Status: DISCONTINUED | OUTPATIENT
Start: 2024-03-16 | End: 2024-03-16 | Stop reason: HOSPADM

## 2024-03-13 RX ORDER — DEXTROSE MONOHYDRATE 25 G/50ML
25 INJECTION, SOLUTION INTRAVENOUS
Status: DISCONTINUED | OUTPATIENT
Start: 2024-03-13 | End: 2024-03-16 | Stop reason: HOSPADM

## 2024-03-13 RX ORDER — INSULIN LISPRO 100 [IU]/ML
2-9 INJECTION, SOLUTION INTRAVENOUS; SUBCUTANEOUS
Status: DISCONTINUED | OUTPATIENT
Start: 2024-03-13 | End: 2024-03-16 | Stop reason: HOSPADM

## 2024-03-13 RX ORDER — FUROSEMIDE 10 MG/ML
80 INJECTION INTRAMUSCULAR; INTRAVENOUS
Status: DISCONTINUED | OUTPATIENT
Start: 2024-03-13 | End: 2024-03-15

## 2024-03-13 RX ADMIN — SODIUM BICARBONATE 1300 MG: 650 TABLET ORAL at 15:08

## 2024-03-13 RX ADMIN — LINEZOLID 600 MG: 600 TABLET, FILM COATED ORAL at 05:07

## 2024-03-13 RX ADMIN — GABAPENTIN 400 MG: 400 CAPSULE ORAL at 17:58

## 2024-03-13 RX ADMIN — INSULIN LISPRO 5 UNITS: 100 INJECTION, SOLUTION INTRAVENOUS; SUBCUTANEOUS at 13:08

## 2024-03-13 RX ADMIN — FUROSEMIDE 80 MG: 10 INJECTION INTRAMUSCULAR; INTRAVENOUS at 15:07

## 2024-03-13 RX ADMIN — TACROLIMUS 1 MG: 1 CAPSULE ORAL at 18:06

## 2024-03-13 RX ADMIN — HEPARIN SODIUM 5000 UNITS: 5000 INJECTION, SOLUTION INTRAVENOUS; SUBCUTANEOUS at 13:08

## 2024-03-13 RX ADMIN — LINEZOLID 600 MG: 600 TABLET, FILM COATED ORAL at 17:58

## 2024-03-13 RX ADMIN — INSULIN LISPRO 3 UNITS: 100 INJECTION, SOLUTION INTRAVENOUS; SUBCUTANEOUS at 18:06

## 2024-03-13 RX ADMIN — AMPICILLIN AND SULBACTAM 3 G: 1; 2 INJECTION, POWDER, FOR SOLUTION INTRAMUSCULAR; INTRAVENOUS at 17:59

## 2024-03-13 RX ADMIN — SODIUM BICARBONATE 1300 MG: 650 TABLET ORAL at 21:00

## 2024-03-13 RX ADMIN — ATORVASTATIN CALCIUM 80 MG: 80 TABLET, FILM COATED ORAL at 21:00

## 2024-03-13 RX ADMIN — TACROLIMUS 1 MG: 1 CAPSULE ORAL at 05:10

## 2024-03-13 RX ADMIN — OMEPRAZOLE 20 MG: 20 CAPSULE, DELAYED RELEASE ORAL at 05:07

## 2024-03-13 RX ADMIN — SODIUM BICARBONATE 1300 MG: 650 TABLET ORAL at 08:41

## 2024-03-13 RX ADMIN — SODIUM CHLORIDE, PRESERVATIVE FREE 10 ML: 5 INJECTION INTRAVENOUS at 05:08

## 2024-03-13 RX ADMIN — INSULIN GLARGINE-YFGN 20 UNITS: 100 INJECTION, SOLUTION SUBCUTANEOUS at 18:07

## 2024-03-13 RX ADMIN — AMPICILLIN AND SULBACTAM 3 G: 1; 2 INJECTION, POWDER, FOR SOLUTION INTRAMUSCULAR; INTRAVENOUS at 05:15

## 2024-03-13 RX ADMIN — HEPARIN SODIUM 5000 UNITS: 5000 INJECTION, SOLUTION INTRAVENOUS; SUBCUTANEOUS at 05:07

## 2024-03-13 RX ADMIN — GABAPENTIN 400 MG: 400 CAPSULE ORAL at 05:07

## 2024-03-13 RX ADMIN — HYDROCORTISONE SODIUM SUCCINATE 50 MG: 100 INJECTION, POWDER, FOR SOLUTION INTRAMUSCULAR; INTRAVENOUS at 05:07

## 2024-03-13 RX ADMIN — AMPICILLIN AND SULBACTAM 3 G: 1; 2 INJECTION, POWDER, FOR SOLUTION INTRAMUSCULAR; INTRAVENOUS at 13:07

## 2024-03-13 RX ADMIN — SODIUM CHLORIDE, PRESERVATIVE FREE 10 ML: 5 INJECTION INTRAVENOUS at 17:58

## 2024-03-13 RX ADMIN — ACETAMINOPHEN 650 MG: 325 TABLET, FILM COATED ORAL at 08:41

## 2024-03-13 RX ADMIN — ACETAMINOPHEN 650 MG: 325 TABLET, FILM COATED ORAL at 21:00

## 2024-03-13 RX ADMIN — HYDROCORTISONE SODIUM SUCCINATE 50 MG: 100 INJECTION, POWDER, FOR SOLUTION INTRAMUSCULAR; INTRAVENOUS at 17:57

## 2024-03-13 RX ADMIN — INSULIN LISPRO 3 UNITS: 100 INJECTION, SOLUTION INTRAVENOUS; SUBCUTANEOUS at 21:04

## 2024-03-13 ASSESSMENT — ENCOUNTER SYMPTOMS
PALPITATIONS: 0
SPUTUM PRODUCTION: 0
VOMITING: 0
ABDOMINAL PAIN: 0
CHILLS: 0
COUGH: 0
FEVER: 0
HEADACHES: 0
NAUSEA: 0
WEAKNESS: 0
SHORTNESS OF BREATH: 0

## 2024-03-13 ASSESSMENT — PAIN DESCRIPTION - PAIN TYPE
TYPE: ACUTE PAIN
TYPE: ACUTE PAIN

## 2024-03-13 NOTE — PROGRESS NOTES
"[facility] -  PROGRESS NOTE               Author: Johnathan Melendez D.O. Date & Time: 3/13/2024  1:43 PM     HPI:  66 year old male with a PMHx of HTN, polycystic kidney disease with ESRD s/p renal transplant 2010 at [facility] [place] on IS meds, DM, past bacteremia/septic shock, SCC of the axilla (diagnosed 12/2023, not yet on chemo) presented 3/7 with left axillary swelling and pain, found to have necrotic infection and sepsis. Initially started on broad spectrum abx and volume expansion but developed shock promoting transfer to the ICU with initiation of pressors. Underwent IR drain placement on necrotic lesions on 3/7. Stress dose steroids. MMF held, tacrolimus transiently held. Shock resolved, transferred out of ICU. Hem/onc consulted, without plans for inpatient therapy.  Baseline cr appears ~1.8, on FK 1mg BID, MMF, pred at home, notes stable levels for many years. Cr elevated to 2.6 on admission and has remained stable since. UA on 3/7 without blood or protein. Urine output not well documented.  Currently without chest pain, sob. Some mild nausea. No ongoing f/c/s.        DAILY NEPHROLOGY SUMMARY:  03/09 - consult done  03/10 - SCr trending up, CO2 and Na trending down, SBP 100s-110s, UOP not fully recorded, c/o edema, no other new c/o  3/11: Cr continues to rise. Pt feels okay, peeing well. Edema still present  3/12: cr improving, repeat UA bland. Spoke with [facility] [place] transplant last afternoon.   3/13: Cr improving, pt feels well.     REVIEW OF SYSTEMS:    10 point ROS reviewed and is as per HPI/daily summary or otherwise negative    PMH/PSH/SH/FH: Reviewed and unchanged since admission note  CURRENT MEDICATIONS: Reviewed from admission to present day    VS:  BP (!) 141/98 Comment: RN notified.  Pulse 84   Temp 36.2 °C (97.2 °F) (Temporal)   Resp 18   Ht 1.956 m (6' 5.01\")   Wt 115 kg (253 lb 8.5 oz)   SpO2 94%   BMI 30.06 kg/m²   Physical Exam  Vitals and nursing note reviewed.   Constitutional:  "      General: He is not in acute distress.  HENT:      Head: Normocephalic and atraumatic.      Right Ear: External ear normal.      Left Ear: External ear normal.      Nose: Nose normal.      Mouth/Throat:      Mouth: Mucous membranes are moist.      Pharynx: Oropharynx is clear.   Eyes:      General: No scleral icterus.     Extraocular Movements: Extraocular movements intact.   Cardiovascular:      Rate and Rhythm: Normal rate and regular rhythm.   Pulmonary:      Effort: Pulmonary effort is normal.      Breath sounds: No wheezing.   Abdominal:      General: There is no distension.      Palpations: Abdomen is soft.   Musculoskeletal:      Right lower leg: Edema present.      Left lower leg: Edema present.   Skin:     General: Skin is warm and dry.      Coloration: Skin is not jaundiced.   Neurological:      General: No focal deficit present.      Mental Status: He is alert and oriented to person, place, and time.      Cranial Nerves: No cranial nerve deficit.   Psychiatric:         Mood and Affect: Mood normal.         Behavior: Behavior normal.         Fluids:  In: 700 [P.O.:700]  Out: 1555     LABS:  Recent Labs     03/11/24  0058 03/12/24  0451 03/13/24  0018   SODIUM 128* 134* 138   POTASSIUM 4.7 4.4 3.9   CHLORIDE 99 102 102   CO2 16* 20 22   GLUCOSE 199* 241* 200*   BUN 77* 80* 70*   CREATININE 3.03* 2.93* 2.45*   CALCIUM 7.8* 8.0* 8.2*              ASSESSMENT:  # SANKET: In setting of septic shock.   -UA bland on 3/11  # Renal Transplant  -Baseline cr appears ~1.8  -2010 at [facility]  -Previous regimen: FK 1mg BID, MMF 500gm BID, pred 5mg   -discussed active Ca with [facility] on 3/11, who agreed with holding MMF due to Ca, and targeting tacrolimus level 3-5, and cont prednisone  -[facility] recommended avoiding PD-1/check point immunotherapy as high risk of rejection  # Immunosuppressed state  # Septic Shock:   -necrotic skin infection/abscess  -broad spectrum abx, s/p IR drain placement   # Hyponatremia:  # Acidosis   # SCC  of the axilla:   -non-resectable.  -Per hem/onc liekly needs immunotherapy/rad therapy.   -would avoid checkpoint (PD-1) immunotherapy as it has a higher risk of causing rejection  -recommend oncology discuss with his transplant center  # Anemia  # PAF  # DM  # CAD  # Thrombocytopenia: chornic     SUGGESTIONS:    -Cr downtrending  -Continue tacrolimus 1mg BID, await FK level (goal 3-5 due to Cancer)  -hold cellcept indefinitely due to active cancer per discussion with [facility] [place] on [place] ([phone]),  -finish stress dose steroids, can restart prednisone 5mg daily for transplant once completed  -Strict I/Os  -Dose all meds per eGFR   -change lasix to 80mg IV daily  -may need/consider low dose midodrine if BP is low with lasix  -cont PO sodium bicarb 1300mg TID for acidosis    Please page nephrology with any questions or concerns

## 2024-03-13 NOTE — PROGRESS NOTES
[facility] Medicine Daily Progress Note    Date of Service  3/13/2024    Chief Complaint  Jama Altman is a 68 y.o. male admitted 3/6/2024 with weakness.    Hospital Course  Jama Altman is a 68 y.o. male w/ a hx of afib recent watchman procedure,  CAD, renal transplant 2010 (hx of polycystic kidney disease), HLD, HTN and PAD.  He was admitted 3/6/2024 with weakness.  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  He had IR place a drain in left axilla 3/7.  A CT scan showed a large left axillary mass with necrosis.  He was hypotensive and received boluses and admitted with septic shock.     S/p IR drain placement 3/7. Repeat shoulder CT 3/11: large, complex left axillary fluid collection. Slightly decreased in size from prior imaging.     Discussed with IR. Given 110mL output overnight; no drain adjustment at this time.     Interval Problem Update  3/12: Per nursing report 110mL out through drain overnight. Patient is up, walking through the halls. No worsening pain. No acute overnight events. Discussed with IR and ID at bedside.     3/13: No acute overnight events.  Drain with 55 mL serosanguineous overnight. No acute overnight events. Afebrile, vitals stable. WBC 8.8, from 8.4. Hb 12.7 from 12.7. Potassium 3.9. Creatinine 2.45 from 2.93.    I have discussed this patient's plan of care and discharge plan at IDT rounds today with Case Management, Nursing, Nursing leadership, and other members of the IDT team.    Consultants/Specialty  infectious disease, oncology, and IR    Code Status  Full Code    Disposition  Medically Cleared  I have placed the appropriate orders for post-discharge needs.    Review of Systems  Review of Systems   Constitutional:  Negative for chills and fever.   Respiratory:  Negative for cough and shortness of breath.    Cardiovascular:  Negative for chest pain.   Gastrointestinal:  Negative for nausea and vomiting.   Musculoskeletal:   Positive for joint pain.        Physical Exam  Temp:  [36.2 °C (97.2 °F)-36.6 °C (97.9 °F)] 36.2 °C (97.2 °F)  Pulse:  [] 84  Resp:  [16-18] 18  BP: (132-145)/(76-98) 141/98  SpO2:  [92 %-96 %] 94 %    Physical Exam  Vitals and nursing note reviewed.   Constitutional:       General: He is not in acute distress.     Appearance: Normal appearance. He is not ill-appearing.   HENT:      Head: Normocephalic.   Cardiovascular:      Rate and Rhythm: Normal rate and regular rhythm.      Heart sounds: Murmur heard.   Pulmonary:      Effort: Pulmonary effort is normal. No respiratory distress.      Breath sounds: Normal breath sounds. No wheezing.   Abdominal:      Tenderness: There is no abdominal tenderness.   Musculoskeletal:         General: Swelling and tenderness present. Normal range of motion.      Cervical back: Normal range of motion. No tenderness.      Comments: NICKI drain to left axilla    Skin:     General: Skin is warm and dry.   Neurological:      Mental Status: He is alert and oriented to person, place, and time.   Psychiatric:         Mood and Affect: Mood normal.         Behavior: Behavior normal.         Fluids    Intake/Output Summary (Last 24 hours) at 3/13/2024 1432  Last data filed at 3/12/2024 1900  Gross per 24 hour   Intake --   Output 820 ml   Net -820 ml       Laboratory  Recent Labs     03/11/24 0058 03/12/24  0451 03/13/24  0018   WBC 10.0 8.4 8.8   RBC 4.13* 4.47* 4.37*   HEMOGLOBIN 11.9* 12.7* 12.7*   HEMATOCRIT 36.0* 38.9* 37.6*   MCV 87.2 87.0 86.0   MCH 28.8 28.4 29.1   MCHC 33.1 32.6 33.8   RDW 44.5 45.0 44.3   PLATELETCT 112* 127* 130*   MPV 11.9 11.8 10.9     Recent Labs     03/11/24  0058 03/12/24  0451 03/13/24  0018   SODIUM 128* 134* 138   POTASSIUM 4.7 4.4 3.9   CHLORIDE 99 102 102   CO2 16* 20 22   GLUCOSE 199* 241* 200*   BUN 77* 80* 70*   CREATININE 3.03* 2.93* 2.45*   CALCIUM 7.8* 8.0* 8.2*                   Imaging  CT-SHOULDER W/O LEFT   Final Result      1.  Large  complex LEFT axillary fluid collection consistent with abscess, with drain in place, slightly decreased in size from prior.   2.  Overlying skin thickening again seen.      US-EXTREMITY VENOUS UPPER UNILAT LEFT   Final Result      CT-DRAIN-SKIN - SUBCUTANEOUS   Final Result      1. Ultrasound GUIDED PLACEMENT OF A PERCUTANEOUS DRAINAGE CATHETER IN A LEFT AXILLARY FLUID COLLECTION.      2.  THE CURRENT PLAN IS TO MONITOR DRAINAGE OUTPUT AND OBTAIN A FOLLOWUP CT SCAN IN 5-7 DAYS IF CLINICALLY INDICATED.      US-RENAL   Final Result      1.  Normal appearance of the right lower quadrant transplant kidney.   2.  Native left kidney demonstrates no hydronephrosis. There are innumerable cysts with very little normal-appearing parenchyma.   3.  Rounded echogenic masslike area in the inferior bladder. It is uncertain if this is related to an enlarged prostate gland protruding into the bladder base or a bladder mass. Recommend clinical correlation with history of hematuria.      CT-CHEST (THORAX) W/O   Final Result      1.  Partially visualized large left axillary mass and necrotic adenopathy is consistent with known squamous cell carcinoma. Central hypodensity in the larger mass may be related to necrosis.      2.  0.9 cm pleural-based nodule in the right lower lobe is nonspecific, possibly present on the prior exam where there were patchy opacities. Follow-up per oncology protocol      3.  Patchy groundglass density and subsegmental atelectasis appears slightly improved from the prior CT. Findings are consistent with a chronic inflammatory/infectious process.      Fleischner Society pulmonary nodule recommendations:   Not Applicable         IR-DRAINAGE-CATH EXCHANGE    (Results Pending)        Assessment/Plan  * Abscess of axilla, left  Assessment & Plan  Secondary to necrotic squamous cell cancer mass  S/p IR drain placement 3/7  Wound culture positive for  Finegoldia magna, MRSA, Group D Enterococcus.      3/13:  -Discussed with IR - upsize and reposition drain   - Drain with 55mL out overnight  - Continue discussions for anbx duration and route     Squamous cell carcinoma of skin  Assessment & Plan  Of left axilla per biopsy on 2/23/24    -Per oncology -  plan outpatient PET scan, immunotherapy and radiation therapy    Immunosuppressed status (HCC)- (present on admission)  Assessment & Plan  [facility] Dr. Carly Becerril ([phone]) okay to continue tacrolimus for now but hold mycophenolate  - Nephrology following    Acute kidney injury superimposed on chronic kidney disease (HCC)- (present on admission)  Assessment & Plan  BUNs/CR 44/2.60 (baseline Cr ~2.0)  Possibly prerenal from sepsis  U/S renal did not show any hydronephrosis nor any signs of active rejection    - Hold cellcept indefinetly due to active cancer  - Continue tacrolimus  - Continue stress steroids; discuss duration with team tomorrow  - Increase IV lasix to 80mg as blood pressure allows  - Daily BMP to monitor for electrolyte abnormalities in the setting of forced diuresis   - Resume home prednisone 5mg after stress steroids  - Nephrology following    Type 2 diabetes mellitus (HCC)- (present on admission)  Assessment & Plan  A1C 2/16/2024 6.9  - Elevated glucose secondary to stress dosing of steroids  - Glucose ranging 200-350s  - Tapering steroid down, discontinue NPH and start lantus once daily with SSI   - Consider restarting home medication in the near future  - Monitor BG trend.   - Accu-Cheks before meals and at bedtime.   - Goal to keep BG between 140-180 per 2019 ADA guidelines      Thrombocytopenia (HCC)- (present on admission)  Assessment & Plan  Possibly secondary to sepsis  Improving  Daily CBC    History of renal transplant- (present on admission)  Assessment & Plan  2/2 polycystic kidney disease s/p right renal transplant in 2010  - Nephrology following  - Resume Tacrolimus  - Hold Cellcept due to active  cancer  - Finish stress dosing of steroids; resume post transplant home prednisone 5mg daily after    Hyponatremia  Assessment & Plan  Sodium 128   follow-up with nephrology recommendation      Adrenal insufficiency (HCC)  Assessment & Plan  Due to chronic steroid use  On hydrocotisone.    ACP (advance care planning)  Assessment & Plan   The patient is willing to  have all life sustaining efforts. Continue full code    Peripheral neuropathy  Assessment & Plan  Continue gabapentin 400mg BID    Septic shock (HCC)- (present on admission)  Assessment & Plan  Resolved     Presence of Watchman left atrial appendage closure device- (present on admission)  Assessment & Plan  Afib, s/p watchman procedure on 1/9/24 off anticoagulation  Rate controlled    Abdominal aortic aneurysm (AAA) without rupture (HCC)- (present on admission)  Assessment & Plan  History of, 3.2 cm on 1/27/23  Monitor blood pressure  Continue to monitor    Acute hypoxic respiratory failure (HCC)- (present on admission)  Assessment & Plan  Resolved    -Resting comfortably on room air.       AF (atrial fibrillation) (HCC)- (present on admission)  Assessment & Plan  History of, in SR, s/p watchman procedure on 1/2024, off anticoagulation  Metoprolol on hold due to low bp and adrenal insufficiency   Monitor HR    GERD (gastroesophageal reflux disease)- (present on admission)  Assessment & Plan  Continue Omeprazole 20 mg daily    Coronary artery disease- (present on admission)  Assessment & Plan  S/p MI with 2 stents in 2011  Continue to hold aspirin 81 mg daily for ongoing NICKI drain and abscess management  Continue Atorvastatin 80 mg daily  Hold Metoprolol due to hypotension    Hyperlipidemia- (present on admission)  Assessment & Plan  Atorvastatin 80 mg for ongoing active treatment    Primary hypertension- (present on admission)  Assessment & Plan  Hypotension during admission  Continue to hold home metoprolol         VTE prophylaxis:    heparin ppx      I  have performed a physical exam and reviewed and updated ROS and Plan today (3/13/2024). In review of yesterday's note (3/12/2024), there are no changes except as documented above.

## 2024-03-13 NOTE — PROGRESS NOTES
"  DATE: 3/13/2024    INTERVAL EVENTS:  Repeat CT imaging.  Moderate to high NICKI output again.    PHYSICAL EXAMINATION:  Vital Signs: BP (!) 141/98   Pulse 84   Temp 36.2 °C (97.2 °F) (Temporal)   Resp 18   Ht 1.956 m (6' 5.01\")   Wt 115 kg (253 lb 8.5 oz)   SpO2 94%     Excoriated and indurated left axilla with percutaneous drain.  Wound care assessment and uploaded images reviewed.    LABORATORY VALUES:  Recent Labs     03/11/24 0058 03/12/24 0451 03/13/24 0018   WBC 10.0 8.4 8.8   RBC 4.13* 4.47* 4.37*   HEMOGLOBIN 11.9* 12.7* 12.7*   HEMATOCRIT 36.0* 38.9* 37.6*   MCV 87.2 87.0 86.0   MCH 28.8 28.4 29.1   MCHC 33.1 32.6 33.8   RDW 44.5 45.0 44.3   PLATELETCT 112* 127* 130*   MPV 11.9 11.8 10.9     Recent Labs     03/11/24 0058 03/12/24 0451 03/13/24  0018   SODIUM 128* 134* 138   POTASSIUM 4.7 4.4 3.9   CHLORIDE 99 102 102   CO2 16* 20 22   GLUCOSE 199* 241* 200*   BUN 77* 80* 70*   CREATININE 3.03* 2.93* 2.45*   CALCIUM 7.8* 8.0* 8.2*     IMAGING:  Review of the patient's repeat CT imaging of the axilla demonstrates a persistent fluid collection with a drain in the inferior aspect of the fluid collection.    ASSESSMENT AND PLAN:  Left axillary abscess and lymphatic leak.  Continue external drainage.  Will defer to interventional radiology team regarding repositioning of drain.       ____________________________________     Eliseo Christensen M.D.    DD: 3/13/2024  9:48 AM    "

## 2024-03-13 NOTE — PROGRESS NOTES
Radiology Progress Note   Author: LARRY Lujan   Date & Time created: 3/13/2024  1:00 PM   Date of admission  3/6/2024  Note to reader: this note follows the APSO format rather than the historical SOAP format. Assessment and plan located at the top of the note for ease of use.    Chief Complaint  68 y.o. male admitted 3/6/2024 with   Chief Complaint   Patient presents with    Flu Like Symptoms     C/o flu like symptoms since yesterday. + body aches and chills. + lightheadedness.     Wound Check     Also c/o left wound possible infection. 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  He was admitted 03/06/2024 for CT finding of left axillary fluid collection after presenting to the ED for worsening axillary pain.  Patient underwent a left axillary abscess drain placement with IR Dr. Gutierrez on 03/07/2024.    Interval History:   03/08/24 - 12 Fr left axillary abscess drain to Left axilla with 455 mL turbid serosanguineous output in the last 24 hours. WBC 13.9. Cultures pending. On ABX. Drain flushed with 10 mL NS.     03/09/24 - 12 Fr left axillary abscess drain to Left axilla with 35 mL of serosanguineous output in the last 24 hours.  I flushed drain with 10 mL normal saline solution.  Order placed to have RNs irrigate wound with 10 mL NSS 2 times daily; I I reviewed today's labs: WBC 13.8; Hgb 11.8, Cr 2.55; Micro from axillary fluid positive for MRSA. I  Afebrile.    03/10/24 - 12 Fr left axillary abscess drain to Left axilla with 85 mL of serosanguineous output in the last 24 hours.  I flushed drain with 10 mL normal saline solution.  Order placed to have RNs irrigate wound with 10 mL NSS 2 times daily; I I reviewed today's labs: WBC 12.0;  Hgb 12.0, Cr 2.78; FSBS 208; micro from axillary fluid positive for Finegoldia magna, MRSA, enterococcus Faecalis. Afebrile.    03/11/24 - 12 Fr left axillary abscess drain to Left axilla with 5 mL of serosanguineous output in the last 24 hours. Labs reviewed: WBC 10.0; Hgb 11.9, Cr 3.03. Surgery had clamped drain earlier in the day and asked me to unclamp this afternoon and put drain back to suction. No immediate fluid from drain following placing back to suction. Pt discussed with surgery and hospitalist.     03/12/24 - 12 Fr left axillary abscess drain to Left axilla with 110 mL of serosanguineous output in the last 24 hours. Labs reviewed: WBC 8.4; Hgb 12.7, Cr 2.93. Pt discussed with surgery and hospitalist.     03/13/24 - 12 Fr left axillary abscess drain to Left axilla with 55 mL of serosanguineous output in the last 24 hours. Labs reviewed: WBC 8.8; Hgb 12.7, Cr 2.45. Pt discussed with surgery and hospitalist.     Assessment/Plan     Principal Problem:    Abscess of axilla, left  Active Problems:    History of renal transplant    Primary hypertension    Hyperlipidemia    Coronary artery disease    Acute kidney injury superimposed on chronic kidney disease (HCC)    Thrombocytopenia (HCC)    GERD (gastroesophageal reflux disease)    Type 2 diabetes mellitus (HCC)    AF (atrial fibrillation) (HCC)    Acute hypoxic respiratory failure (HCC)    Immunosuppressed status (HCC)    Abdominal aortic aneurysm (AAA) without rupture (HCC)    Presence of Watchman left atrial appendage closure device    Septic shock (HCC)    Peripheral neuropathy    Squamous cell carcinoma of skin    Severe sepsis (HCC)    Axillary mass, left    ACP (advance care planning)    Adrenal insufficiency (HCC)    Hyponatremia      Plan IR  - Continue orders for RNs to irrigate Left axillary drain with 10 ml of sterile saline each shift  - Cultures positive for Finegoldia magna, MRSA, enterococcus Faecalis  - ABX per ID  - general surgery following    - Order placed to upsize/reposition drain  - Continue to monitor drains, VS, and labs      -Thank you for allowing Interventional Radiology team to participate in the patients care, if any additional care or requests are needed in the future please do not hesitate to call or place IR order   [phone]           Review of Systems  Physical Exam   Review of Systems   Constitutional:  Negative for chills and fever.   Respiratory:  Negative for sputum production and shortness of breath.    Cardiovascular:  Negative for chest pain and palpitations.   Gastrointestinal:  Negative for abdominal pain, nausea and vomiting.   Musculoskeletal:         Left axillary pain (improving)   Neurological:  Negative for weakness and headaches.      Vitals:    03/13/24 0814   BP: (!) 141/98   Pulse: 84   Resp: 18   Temp: 36.2 °C (97.2 °F)   SpO2: 94%        Physical Exam  Vitals and nursing note reviewed.   Constitutional:       General: He is not in acute distress.     Appearance: Normal appearance.   Cardiovascular:      Rate and Rhythm: Normal rate.      Pulses: Normal pulses.   Pulmonary:      Effort: Pulmonary effort is normal. No respiratory distress.   Abdominal:      Palpations: Abdomen is soft.   Musculoskeletal:         General: Tenderness (Left axilla improving) present.      Comments: IR drain to left axilla   Skin:     General: Skin is warm and dry.   Neurological:      General: No focal deficit present.      Mental Status: He is alert and oriented to person, place, and time.   Psychiatric:         Attention and Perception: Attention normal.         Mood and Affect: Mood normal.         Behavior: Behavior normal.             Labs    Recent Labs     03/11/24  0058 03/12/24  0451 03/13/24  0018   WBC 10.0 8.4 8.8   RBC 4.13* 4.47* 4.37*   HEMOGLOBIN 11.9* 12.7* 12.7*   HEMATOCRIT 36.0* 38.9* 37.6*   MCV 87.2 87.0 86.0   MCH 28.8 28.4 29.1   MCHC 33.1 32.6 33.8   RDW 44.5 45.0 44.3   PLATELETCT 112* 127* 130*   MPV 11.9 11.8  10.9     Recent Labs     03/11/24  0058 03/12/24  0451 03/13/24  0018   SODIUM 128* 134* 138   POTASSIUM 4.7 4.4 3.9   CHLORIDE 99 102 102   CO2 16* 20 22   GLUCOSE 199* 241* 200*   BUN 77* 80* 70*   CREATININE 3.03* 2.93* 2.45*   CALCIUM 7.8* 8.0* 8.2*     Recent Labs     03/11/24  0058 03/12/24  0451 03/13/24  0018   CREATININE 3.03* 2.93* 2.45*     CT-SHOULDER W/O LEFT   Final Result      1.  Large complex LEFT axillary fluid collection consistent with abscess, with drain in place, slightly decreased in size from prior.   2.  Overlying skin thickening again seen.      US-EXTREMITY VENOUS UPPER UNILAT LEFT   Final Result      CT-DRAIN-SKIN - SUBCUTANEOUS   Final Result      1. Ultrasound GUIDED PLACEMENT OF A PERCUTANEOUS DRAINAGE CATHETER IN A LEFT AXILLARY FLUID COLLECTION.      2.  THE CURRENT PLAN IS TO MONITOR DRAINAGE OUTPUT AND OBTAIN A FOLLOWUP CT SCAN IN 5-7 DAYS IF CLINICALLY INDICATED.      US-RENAL   Final Result      1.  Normal appearance of the right lower quadrant transplant kidney.   2.  Native left kidney demonstrates no hydronephrosis. There are innumerable cysts with very little normal-appearing parenchyma.   3.  Rounded echogenic masslike area in the inferior bladder. It is uncertain if this is related to an enlarged prostate gland protruding into the bladder base or a bladder mass. Recommend clinical correlation with history of hematuria.      CT-CHEST (THORAX) W/O   Final Result      1.  Partially visualized large left axillary mass and necrotic adenopathy is consistent with known squamous cell carcinoma. Central hypodensity in the larger mass may be related to necrosis.      2.  0.9 cm pleural-based nodule in the right lower lobe is nonspecific, possibly present on the prior exam where there were patchy opacities. Follow-up per oncology protocol      3.  Patchy groundglass density and subsegmental atelectasis appears slightly improved from the prior CT. Findings are consistent with a chronic  inflammatory/infectious process.      Fleischner Society pulmonary nodule recommendations:   Not Applicable           INR   Date Value Ref Range Status   03/06/2024 1.34 (H) 0.87 - 1.13 Final     Comment:     INR - Non-therapeutic Reference Range: 0.87-1.13  INR - Therapeutic Reference Range: 2.0-4.0       POC INR   Date Value Ref Range Status   08/03/2022 2.1 (H) 0.9 - 1.2 Final     Comment:     INR - Non-therapeutic Reference Range: 0.9-1.2  INR - Therapeutic Reference Range: 2.0-4.0          Intake/Output Summary (Last 24 hours) at 3/8/2024 0833  Last data filed at 3/8/2024 0655  Gross per 24 hour   Intake 100 ml   Output 1025 ml   Net -925 ml      Labs not explicitly included in this progress note were reviewed by the author. Radiology/imaging not explicitly included in this progress note was reviewed by the author.     I have performed a physical exam and reviewed and updated ROS and Plan today (3/13/2024).     36 minutes in directly providing and coordinating care and extensive data review.  No time overlap and excludes procedures.

## 2024-03-13 NOTE — CARE PLAN
The patient is Stable - Low risk of patient condition declining or worsening    Shift Goals  Clinical Goals: NICKI drain management; dressing change; iv abx  Patient Goals: rest  Family Goals: maryellen    Progress made toward(s) clinical / shift goals:      Problem: Pain - Standard  Goal: Alleviation of pain or a reduction in pain to the patient’s comfort goal  Outcome: Progressing     Problem: Knowledge Deficit - Standard  Goal: Patient and family/care givers will demonstrate understanding of plan of care, disease process/condition, diagnostic tests and medications  Outcome: Progressing       Patient is not progressing towards the following goals:

## 2024-03-14 ENCOUNTER — APPOINTMENT (OUTPATIENT)
Dept: RADIOLOGY | Facility: MEDICAL CENTER | Age: 69
DRG: 871 | End: 2024-03-14
Attending: NURSE PRACTITIONER
Payer: MEDICARE

## 2024-03-14 LAB
ANION GAP SERPL CALC-SCNC: 11 MMOL/L (ref 7–16)
BUN SERPL-MCNC: 58 MG/DL (ref 8–22)
CALCIUM SERPL-MCNC: 8.6 MG/DL (ref 8.5–10.5)
CHLORIDE SERPL-SCNC: 102 MMOL/L (ref 96–112)
CO2 SERPL-SCNC: 28 MMOL/L (ref 20–33)
CREAT SERPL-MCNC: 1.91 MG/DL (ref 0.5–1.4)
ERYTHROCYTE [DISTWIDTH] IN BLOOD BY AUTOMATED COUNT: 44.9 FL (ref 35.9–50)
GFR SERPLBLD CREATININE-BSD FMLA CKD-EPI: 38 ML/MIN/1.73 M 2
GLUCOSE BLD STRIP.AUTO-MCNC: 148 MG/DL (ref 65–99)
GLUCOSE BLD STRIP.AUTO-MCNC: 158 MG/DL (ref 65–99)
GLUCOSE BLD STRIP.AUTO-MCNC: 182 MG/DL (ref 65–99)
GLUCOSE BLD STRIP.AUTO-MCNC: 230 MG/DL (ref 65–99)
GLUCOSE BLD STRIP.AUTO-MCNC: 269 MG/DL (ref 65–99)
GLUCOSE SERPL-MCNC: 155 MG/DL (ref 65–99)
GRAM STN SPEC: NORMAL
HCT VFR BLD AUTO: 40.6 % (ref 42–52)
HGB BLD-MCNC: 13.2 G/DL (ref 14–18)
MCH RBC QN AUTO: 28.6 PG (ref 27–33)
MCHC RBC AUTO-ENTMCNC: 32.5 G/DL (ref 32.3–36.5)
MCV RBC AUTO: 87.9 FL (ref 81.4–97.8)
PLATELET # BLD AUTO: 143 K/UL (ref 164–446)
PMV BLD AUTO: 10.9 FL (ref 9–12.9)
POTASSIUM SERPL-SCNC: 3.8 MMOL/L (ref 3.6–5.5)
RBC # BLD AUTO: 4.62 M/UL (ref 4.7–6.1)
SIGNIFICANT IND 70042: NORMAL
SITE SITE: NORMAL
SODIUM SERPL-SCNC: 141 MMOL/L (ref 135–145)
SOURCE SOURCE: NORMAL
WBC # BLD AUTO: 7.9 K/UL (ref 4.8–10.8)

## 2024-03-14 PROCEDURE — 99233 SBSQ HOSP IP/OBS HIGH 50: CPT | Performed by: INTERNAL MEDICINE

## 2024-03-14 PROCEDURE — 700111 HCHG RX REV CODE 636 W/ 250 OVERRIDE (IP): Mod: JZ | Performed by: INTERNAL MEDICINE

## 2024-03-14 PROCEDURE — 87077 CULTURE AEROBIC IDENTIFY: CPT

## 2024-03-14 PROCEDURE — 700111 HCHG RX REV CODE 636 W/ 250 OVERRIDE (IP): Mod: JZ | Performed by: STUDENT IN AN ORGANIZED HEALTH CARE EDUCATION/TRAINING PROGRAM

## 2024-03-14 PROCEDURE — 87205 SMEAR GRAM STAIN: CPT

## 2024-03-14 PROCEDURE — 700102 HCHG RX REV CODE 250 W/ 637 OVERRIDE(OP): Performed by: STUDENT IN AN ORGANIZED HEALTH CARE EDUCATION/TRAINING PROGRAM

## 2024-03-14 PROCEDURE — 85027 COMPLETE CBC AUTOMATED: CPT

## 2024-03-14 PROCEDURE — 87070 CULTURE OTHR SPECIMN AEROBIC: CPT

## 2024-03-14 PROCEDURE — 82962 GLUCOSE BLOOD TEST: CPT | Mod: 91

## 2024-03-14 PROCEDURE — A9270 NON-COVERED ITEM OR SERVICE: HCPCS | Performed by: INTERNAL MEDICINE

## 2024-03-14 PROCEDURE — 700102 HCHG RX REV CODE 250 W/ 637 OVERRIDE(OP): Performed by: INTERNAL MEDICINE

## 2024-03-14 PROCEDURE — 99231 SBSQ HOSP IP/OBS SF/LOW 25: CPT | Performed by: REGISTERED NURSE

## 2024-03-14 PROCEDURE — 700111 HCHG RX REV CODE 636 W/ 250 OVERRIDE (IP): Performed by: HOSPITALIST

## 2024-03-14 PROCEDURE — 4410111 CT-DRAINAGE-CATH EXCHANGE

## 2024-03-14 PROCEDURE — 0X27X0Z CHANGE DRAINAGE DEVICE IN LEFT UPPER EXTREMITY, EXTERNAL APPROACH: ICD-10-PCS | Performed by: RADIOLOGY

## 2024-03-14 PROCEDURE — 87147 CULTURE TYPE IMMUNOLOGIC: CPT

## 2024-03-14 PROCEDURE — A9270 NON-COVERED ITEM OR SERVICE: HCPCS | Performed by: STUDENT IN AN ORGANIZED HEALTH CARE EDUCATION/TRAINING PROGRAM

## 2024-03-14 PROCEDURE — 87186 SC STD MICRODIL/AGAR DIL: CPT

## 2024-03-14 PROCEDURE — 700111 HCHG RX REV CODE 636 W/ 250 OVERRIDE (IP): Mod: JZ

## 2024-03-14 PROCEDURE — 770004 HCHG ROOM/CARE - ONCOLOGY PRIVATE *

## 2024-03-14 PROCEDURE — 36415 COLL VENOUS BLD VENIPUNCTURE: CPT

## 2024-03-14 PROCEDURE — 700105 HCHG RX REV CODE 258: Performed by: STUDENT IN AN ORGANIZED HEALTH CARE EDUCATION/TRAINING PROGRAM

## 2024-03-14 PROCEDURE — 700101 HCHG RX REV CODE 250: Performed by: NURSE PRACTITIONER

## 2024-03-14 PROCEDURE — 80048 BASIC METABOLIC PNL TOTAL CA: CPT

## 2024-03-14 PROCEDURE — 99232 SBSQ HOSP IP/OBS MODERATE 35: CPT | Performed by: STUDENT IN AN ORGANIZED HEALTH CARE EDUCATION/TRAINING PROGRAM

## 2024-03-14 RX ORDER — OXYCODONE HYDROCHLORIDE 10 MG/1
10 TABLET ORAL
Status: ACTIVE | OUTPATIENT
Start: 2024-03-14 | End: 2024-03-15

## 2024-03-14 RX ORDER — MIDAZOLAM HYDROCHLORIDE 1 MG/ML
.5-2 INJECTION INTRAMUSCULAR; INTRAVENOUS PRN
Status: DISCONTINUED | OUTPATIENT
Start: 2024-03-14 | End: 2024-03-14

## 2024-03-14 RX ORDER — MIDAZOLAM HYDROCHLORIDE 1 MG/ML
.5-2 INJECTION INTRAMUSCULAR; INTRAVENOUS PRN
Status: ACTIVE | OUTPATIENT
Start: 2024-03-14 | End: 2024-03-14

## 2024-03-14 RX ORDER — SODIUM CHLORIDE 9 MG/ML
500 INJECTION, SOLUTION INTRAVENOUS
Status: DISCONTINUED | OUTPATIENT
Start: 2024-03-14 | End: 2024-03-14

## 2024-03-14 RX ORDER — ONDANSETRON 2 MG/ML
4 INJECTION INTRAMUSCULAR; INTRAVENOUS PRN
Status: DISCONTINUED | OUTPATIENT
Start: 2024-03-14 | End: 2024-03-14

## 2024-03-14 RX ORDER — SODIUM CHLORIDE 9 MG/ML
500 INJECTION, SOLUTION INTRAVENOUS
Status: ACTIVE | OUTPATIENT
Start: 2024-03-14 | End: 2024-03-14

## 2024-03-14 RX ORDER — ONDANSETRON 2 MG/ML
4 INJECTION INTRAMUSCULAR; INTRAVENOUS PRN
Status: ACTIVE | OUTPATIENT
Start: 2024-03-14 | End: 2024-03-14

## 2024-03-14 RX ORDER — OXYCODONE HYDROCHLORIDE 5 MG/1
5 TABLET ORAL
Status: ACTIVE | OUTPATIENT
Start: 2024-03-14 | End: 2024-03-15

## 2024-03-14 RX ADMIN — FENTANYL CITRATE 50 MCG: 50 INJECTION, SOLUTION INTRAMUSCULAR; INTRAVENOUS at 15:34

## 2024-03-14 RX ADMIN — TACROLIMUS 1 MG: 1 CAPSULE ORAL at 17:32

## 2024-03-14 RX ADMIN — ACETAMINOPHEN 650 MG: 325 TABLET, FILM COATED ORAL at 05:46

## 2024-03-14 RX ADMIN — INSULIN LISPRO 5 UNITS: 100 INJECTION, SOLUTION INTRAVENOUS; SUBCUTANEOUS at 21:32

## 2024-03-14 RX ADMIN — ACETAMINOPHEN 650 MG: 325 TABLET, FILM COATED ORAL at 17:32

## 2024-03-14 RX ADMIN — LINEZOLID 600 MG: 600 TABLET, FILM COATED ORAL at 05:29

## 2024-03-14 RX ADMIN — SODIUM CHLORIDE, PRESERVATIVE FREE 10 ML: 5 INJECTION INTRAVENOUS at 05:31

## 2024-03-14 RX ADMIN — HYDROCORTISONE SODIUM SUCCINATE 50 MG: 100 INJECTION, POWDER, FOR SOLUTION INTRAMUSCULAR; INTRAVENOUS at 05:30

## 2024-03-14 RX ADMIN — GABAPENTIN 400 MG: 400 CAPSULE ORAL at 17:32

## 2024-03-14 RX ADMIN — SODIUM BICARBONATE 1300 MG: 650 TABLET ORAL at 08:42

## 2024-03-14 RX ADMIN — INSULIN LISPRO 2 UNITS: 100 INJECTION, SOLUTION INTRAVENOUS; SUBCUTANEOUS at 17:36

## 2024-03-14 RX ADMIN — SODIUM BICARBONATE 1300 MG: 650 TABLET ORAL at 21:28

## 2024-03-14 RX ADMIN — LINEZOLID 600 MG: 600 TABLET, FILM COATED ORAL at 17:32

## 2024-03-14 RX ADMIN — INSULIN LISPRO 2 UNITS: 100 INJECTION, SOLUTION INTRAVENOUS; SUBCUTANEOUS at 08:41

## 2024-03-14 RX ADMIN — GABAPENTIN 400 MG: 400 CAPSULE ORAL at 05:29

## 2024-03-14 RX ADMIN — HYDROCORTISONE SODIUM SUCCINATE 50 MG: 100 INJECTION, POWDER, FOR SOLUTION INTRAMUSCULAR; INTRAVENOUS at 06:00

## 2024-03-14 RX ADMIN — AMPICILLIN AND SULBACTAM 3 G: 1; 2 INJECTION, POWDER, FOR SOLUTION INTRAMUSCULAR; INTRAVENOUS at 05:29

## 2024-03-14 RX ADMIN — FUROSEMIDE 80 MG: 10 INJECTION INTRAMUSCULAR; INTRAVENOUS at 05:30

## 2024-03-14 RX ADMIN — AMPICILLIN AND SULBACTAM 3 G: 1; 2 INJECTION, POWDER, FOR SOLUTION INTRAMUSCULAR; INTRAVENOUS at 12:08

## 2024-03-14 RX ADMIN — INSULIN GLARGINE-YFGN 20 UNITS: 100 INJECTION, SOLUTION SUBCUTANEOUS at 17:36

## 2024-03-14 RX ADMIN — TACROLIMUS 1 MG: 1 CAPSULE ORAL at 05:29

## 2024-03-14 RX ADMIN — HYDROCORTISONE SODIUM SUCCINATE 50 MG: 100 INJECTION, POWDER, FOR SOLUTION INTRAMUSCULAR; INTRAVENOUS at 17:35

## 2024-03-14 RX ADMIN — SODIUM BICARBONATE 1300 MG: 650 TABLET ORAL at 18:37

## 2024-03-14 RX ADMIN — SODIUM CHLORIDE, PRESERVATIVE FREE 10 ML: 5 INJECTION INTRAVENOUS at 18:00

## 2024-03-14 RX ADMIN — AMPICILLIN AND SULBACTAM 3 G: 1; 2 INJECTION, POWDER, FOR SOLUTION INTRAMUSCULAR; INTRAVENOUS at 00:08

## 2024-03-14 RX ADMIN — ATORVASTATIN CALCIUM 80 MG: 80 TABLET, FILM COATED ORAL at 21:28

## 2024-03-14 RX ADMIN — AMPICILLIN AND SULBACTAM 3 G: 1; 2 INJECTION, POWDER, FOR SOLUTION INTRAMUSCULAR; INTRAVENOUS at 17:35

## 2024-03-14 ASSESSMENT — ENCOUNTER SYMPTOMS
ABDOMINAL PAIN: 0
HEADACHES: 0
FEVER: 0
NAUSEA: 0
CHILLS: 0
MYALGIAS: 1
SPUTUM PRODUCTION: 0
COUGH: 0
WEAKNESS: 0
PALPITATIONS: 0
VOMITING: 0
SHORTNESS OF BREATH: 0

## 2024-03-14 ASSESSMENT — PAIN DESCRIPTION - PAIN TYPE
TYPE: ACUTE PAIN

## 2024-03-14 NOTE — PROGRESS NOTES
"[facility] -  PROGRESS NOTE               Author: Johnathan Melendez D.O. 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 Initially started on broad spectrum abx and volume expansion but developed shock promoting transfer to the ICU with initiation of pressors. Underwent IR drain placement on necrotic lesions on 3/7. Stress dose steroids. MMF held, tacrolimus transiently held. Shock resolved, transferred out of ICU. Hem/onc consulted, without plans for inpatient therapy.  Baseline cr appears ~1.8, on FK 1mg BID, MMF, pred at home, notes stable levels for many years. Cr elevated to 2.6 on admission and has remained stable since. UA on 3/7 without blood or protein. Urine output not well documented.  Currently without chest pain, sob. Some mild nausea. No ongoing f/c/s.        DAILY NEPHROLOGY SUMMARY:  03/09 - consult done  03/10 - SCr trending up, CO2 and Na trending down, SBP 100s-110s, UOP not fully recorded, c/o edema, no other new c/o  3/11: Cr continues to rise. Pt feels okay, peeing well. Edema still present  3/12: cr improving, repeat UA bland. Spoke with [facility] transplant last afternoon.   3/13: Cr improving, pt feels well.   3/14: Cr now at baseline. Pt without any major complaints.     REVIEW OF SYSTEMS:    10 point ROS reviewed and is as per HPI/daily summary or otherwise negative    PMH/PSH/SH/FH: Reviewed and unchanged since admission note  CURRENT MEDICATIONS: Reviewed from admission to present day    VS:  BP (!) 145/92   Pulse 86   Temp 36.4 °C (97.6 °F) (Oral)   Resp 16   Ht 1.956 m (6' 5.01\")   Wt 115 kg (253 lb 8.5 oz)   SpO2 93%   BMI 30.06 kg/m²   Physical Exam  Vitals and " nursing note reviewed.   Constitutional:       General: He is not in acute distress.  HENT:      Head: Normocephalic and atraumatic.      Right Ear: External ear normal.      Left Ear: External ear normal.      Nose: Nose normal.      Mouth/Throat:      Mouth: Mucous membranes are moist.      Pharynx: Oropharynx is clear.   Eyes:      General: No scleral icterus.     Extraocular Movements: Extraocular movements intact.   Cardiovascular:      Rate and Rhythm: Normal rate and regular rhythm.   Pulmonary:      Effort: Pulmonary effort is normal.      Breath sounds: No wheezing.   Abdominal:      General: There is no distension.      Palpations: Abdomen is soft.   Musculoskeletal:      Right lower leg: Edema present.      Left lower leg: Edema present.   Skin:     General: Skin is warm and dry.      Coloration: Skin is not jaundiced.   Neurological:      General: No focal deficit present.      Mental Status: He is alert and oriented to person, place, and time.      Cranial Nerves: No cranial nerve deficit.   Psychiatric:         Mood and Affect: Mood normal.         Behavior: Behavior normal.         Fluids:  In: 440 [P.O.:440]  Out: 2835     LABS:  Recent Labs     03/12/24  0451 03/13/24  0018 03/14/24  0338   SODIUM 134* 138 141   POTASSIUM 4.4 3.9 3.8   CHLORIDE 102 102 102   CO2 20 22 28   GLUCOSE 241* 200* 155*   BUN 80* 70* 58*   CREATININE 2.93* 2.45* 1.91*   CALCIUM 8.0* 8.2* 8.6              ASSESSMENT:  # SANKET: In setting of septic shock.   -UA bland on 3/11  # Renal Transplant  -Baseline cr appears ~1.8  -2010 at [facility]  -Previous regimen: FK 1mg BID, MMF 500gm BID, pred 5mg   -discussed active Ca with [facility] on 3/11, who agreed with holding MMF due to Ca, and targeting tacrolimus level 3-5, and cont prednisone  -[facility] recommended avoiding PD-1/check point immunotherapy as high risk of rejection  # Immunosuppressed state  # Septic Shock:   -necrotic skin infection/abscess  -broad spectrum abx, s/p IR drain  placement   # Hyponatremia:  # Acidosis   # SCC of the axilla:   -non-resectable.  -Per hem/onc liekly needs immunotherapy/rad therapy.   -would avoid checkpoint (PD-1) immunotherapy as it has a higher risk of causing rejection  -recommend oncology discuss with his transplant center  # Anemia  # PAF  # DM  # CAD  # Thrombocytopenia: chornic     SUGGESTIONS:    -Cr now at baseline  -Continue tacrolimus 1mg BID, await FK level (goal 3-5 due to Cancer)  -hold cellcept indefinitely due to active cancer per discussion with [facility] transplant on 3/11 ([phone]),  -finish stress dose steroids, can restart prednisone 5mg daily for transplant once completed (3/15)  -Strict I/Os  -Dose all meds per eGFR   -cont lasix to 80mg IV daily, pt not on any diuretics at home  -cont PO sodium bicarb 1300mg TID for acidosis    Please page nephrology with any questions or concerns

## 2024-03-14 NOTE — OR SURGEON
Immediate Post- Operative Note        Findings: Dependent Left Axillar Abscess      Procedure(s): Removal and Replacement of Left Axillary Drainage Catheter    Estimated Blood Loss: Less than 5 ml    Complications: None        3/14/2024     3:49 PM     Lanre Deal M.D.

## 2024-03-14 NOTE — DISCHARGE PLANNING
HTH/SCP TCN chart review completed. Collaborated with DWAINE Lundberg. Current discharge considerations are for Home with close outpatient f/u's when medically cleared.  TCN will continue to follow and collaborate with discharge planning team as additional post acute needs arise. Thank you.     Completed:  Choice obtained: None.  See above.   SCP with [facility] PCP.  Refered to schedulers for Follow up appointment.  F/u scheduled for 3-20-24 at 4:20 PM.

## 2024-03-14 NOTE — PROGRESS NOTES
Pt presents to CT4. Mr. Altman was consented by MD at bedside, confirmed by this RN and consent at bedside. Pt transferred to CT4 table in Supine position. Patient underwent a axillary catheter exchange upsized by Dr. Deal. Procedure site was marked by MD and verified using imaging guidance. Pt placed on monitor, prepped and draped in a sterile fashion. Vitals were taken every 5 minutes and remained stable during procedure (see doc flow sheet for results). CO2 waveform capnography was monitored and remained WNL throughout procedure. Report called to Nelia MONTENEGRO. Pt transported by stretcher with RN to R325.     Specimen: 20 ml or serosanguinous  hand delivered to lab for fluid culture.    Dahu  14Fr X 25cm   REF: [id number]  LOT: [id number]  EXP: 01-

## 2024-03-14 NOTE — PROGRESS NOTES
"  DATE: 3/14/2024    INTERVAL EVENTS:  Output 35 ml, S/S  Pending drain upsize per IR.    PHYSICAL EXAMINATION:  Vital Signs: BP (!) 145/92   Pulse 86   Temp 36.4 °C (97.6 °F) (Oral)   Resp 16   Ht 1.956 m (6' 5.01\")   Wt 115 kg (253 lb 8.5 oz)   SpO2 93%     Excoriated and indurated left axilla with percutaneous drain.  Wound care assessment and uploaded images reviewed.    LABORATORY VALUES:  Recent Labs     03/12/24 0451 03/13/24 0018 03/14/24 0338   WBC 8.4 8.8 7.9   RBC 4.47* 4.37* 4.62*   HEMOGLOBIN 12.7* 12.7* 13.2*   HEMATOCRIT 38.9* 37.6* 40.6*   MCV 87.0 86.0 87.9   MCH 28.4 29.1 28.6   MCHC 32.6 33.8 32.5   RDW 45.0 44.3 44.9   PLATELETCT 127* 130* 143*   MPV 11.8 10.9 10.9     Recent Labs     03/12/24 0451 03/13/24 0018 03/14/24 0338   SODIUM 134* 138 141   POTASSIUM 4.4 3.9 3.8   CHLORIDE 102 102 102   CO2 20 22 28   GLUCOSE 241* 200* 155*   BUN 80* 70* 58*   CREATININE 2.93* 2.45* 1.91*   CALCIUM 8.0* 8.2* 8.6     IMAGING:  Review of the patient's repeat CT imaging of the axilla demonstrates a persistent fluid collection with a drain in the inferior aspect of the fluid collection.    ASSESSMENT AND PLAN:  Left axillary abscess and lymphatic leak.  Continue external drainage.  Will defer to interventional radiology team regarding up sizing of drain of drain.       ____________________________________     SHARON James    DD: 3/13/2024  9:48 AM    "

## 2024-03-14 NOTE — DISCHARGE PLANNING
TCN following. HTH/SCP chart reviewed. Collaborated with CM. No new TCN needs identified. Please see prior TCN note from 3/13/2024 noting current discharge considerations are  Home with close outpatient f/u's when medically cleared.  Per chart review, patient is followed by surgery and ID at this time. TCN will continue to follow and collaborate with discharge planning team as additional post acute needs arise. Thank you.      Completed:  Choice obtained: None.  See above.   SCP with [facility] PCP.  Refered to schedulers for Follow up appointment.  F/u scheduled for 3-20-24 at 4:20 PM.

## 2024-03-14 NOTE — CARE PLAN
The patient is Watcher - Medium risk of patient condition declining or worsening    Shift Goals  Clinical Goals: monitor NICKI drain  Patient Goals: eat  Family Goals: maryellen    Progress made toward(s) clinical / shift goals:  Patient is AxO x4 and understands plan of care, all questions answered at this time. Call light and personal belongings are within reach. Pt calls appropriately for nursing needs. Frequent rounding in place. Bed is locked and in lowest position. Pt down for new NICKI drain placement.     Patient is not progressing towards the following goals: NA

## 2024-03-14 NOTE — PROGRESS NOTES
Radiology Progress Note   Author: LARRY Lujan   Date & Time created: 3/14/2024  2:15 PM   Date of admission  3/6/2024  Note to reader: this note follows the APSO format rather than the historical SOAP format. Assessment and plan located at the top of the note for ease of use.    Chief Complaint  68 y.o. male admitted 3/6/2024 with   Chief Complaint   Patient presents with    Flu Like Symptoms     C/o flu like symptoms since yesterday. + body aches and chills. + lightheadedness.     Wound Check     Also c/o left wound possible infection. 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  He was admitted 03/06/2024 for CT finding of left axillary fluid collection after presenting to the ED for worsening axillary pain.  Patient underwent a left axillary abscess drain placement with IR Dr. Gutierrez on 03/07/2024.    Interval History:   03/08/24 - 12 Fr left axillary abscess drain to Left axilla with 455 mL turbid serosanguineous output in the last 24 hours. WBC 13.9. Cultures pending. On ABX. Drain flushed with 10 mL NS.     03/09/24 - 12 Fr left axillary abscess drain to Left axilla with 35 mL of serosanguineous output in the last 24 hours.  I flushed drain with 10 mL normal saline solution.  Order placed to have RNs irrigate wound with 10 mL NSS 2 times daily; I I reviewed today's labs: WBC 13.8; Hgb 11.8, Cr 2.55; Micro from axillary fluid positive for MRSA. I  Afebrile.    03/10/24 - 12 Fr left axillary abscess drain to Left axilla with 85 mL of serosanguineous output in the last 24 hours.  I flushed drain with 10 mL normal saline solution.  Order placed to have RNs irrigate wound with 10 mL NSS 2 times daily; I I reviewed today's labs: WBC 12.0;  Hgb 12.0, Cr 2.78; FSBS 208; micro from axillary fluid positive for Finegoldia magna, MRSA, enterococcus Faecalis. Afebrile.    03/11/24 - 12 Fr left axillary abscess drain to Left axilla with 5 mL of serosanguineous output in the last 24 hours. Labs reviewed: WBC 10.0; Hgb 11.9, Cr 3.03. Surgery had clamped drain earlier in the day and asked me to unclamp this afternoon and put drain back to suction. No immediate fluid from drain following placing back to suction. Pt discussed with surgery and hospitalist.     03/12/24 - 12 Fr left axillary abscess drain to Left axilla with 110 mL of serosanguineous output in the last 24 hours. Labs reviewed: WBC 8.4; Hgb 12.7, Cr 2.93. Pt discussed with surgery and hospitalist.     03/13/24 - 12 Fr left axillary abscess drain to Left axilla with 55 mL of serosanguineous output in the last 24 hours. Labs reviewed: WBC 8.8; Hgb 12.7, Cr 2.45. Pt discussed with surgery and hospitalist.     03/14/24 - 12 Fr left axillary abscess drain to Left axilla with 35 mL of serosanguineous output in the last 24 hours. Labs reviewed: WBC 7.9; Hgb 13.2, Cr 1.91. Pt discussed with hospitalist. Drain upsizing pending.     Assessment/Plan     Principal Problem:    Abscess of axilla, left  Active Problems:    History of renal transplant    Primary hypertension    Hyperlipidemia    Coronary artery disease    Acute kidney injury superimposed on chronic kidney disease (HCC)    Thrombocytopenia (HCC)    GERD (gastroesophageal reflux disease)    Type 2 diabetes mellitus (HCC)    AF (atrial fibrillation) (HCC)    Acute hypoxic respiratory failure (HCC)    Immunosuppressed status (HCC)    Abdominal aortic aneurysm (AAA) without rupture (HCC)    Presence of Watchman left atrial appendage closure device    Septic shock (HCC)    Peripheral neuropathy    Squamous cell carcinoma of skin    Severe sepsis (HCC)    Axillary mass, left    ACP (advance care planning)    Adrenal insufficiency (HCC)     Hyponatremia      Plan IR  - Continue orders for RNs to irrigate Left axillary drain with 10 ml of sterile saline each shift  - Cultures positive for Finegoldia magna, MRSA, enterococcus Faecalis  - ABX per ID  - general surgery, nephrology, and ID following   - Drain upsize/reposition pending  - Continue to monitor drains, VS, and labs      -Thank you for allowing Interventional Radiology team to participate in the patients care, if any additional care or requests are needed in the future please do not hesitate to call or place IR order   [phone]           Review of Systems  Physical Exam   Review of Systems   Constitutional:  Negative for chills and fever.   Respiratory:  Negative for sputum production and shortness of breath.    Cardiovascular:  Negative for chest pain and palpitations.   Gastrointestinal:  Negative for abdominal pain, nausea and vomiting.   Musculoskeletal:         Left axillary pain (improving)   Neurological:  Negative for weakness and headaches.      Vitals:    03/14/24 1535   BP: (!) 153/93   Pulse: 99   Resp: (!) 26   Temp:    SpO2: 98%        Physical Exam  Vitals and nursing note reviewed.   Constitutional:       General: He is not in acute distress.     Appearance: Normal appearance.   Cardiovascular:      Rate and Rhythm: Normal rate.      Pulses: Normal pulses.   Pulmonary:      Effort: Pulmonary effort is normal. No respiratory distress.   Abdominal:      Palpations: Abdomen is soft.   Musculoskeletal:         General: Tenderness (Left axilla improving) present.      Comments: IR drain to left axilla   Skin:     General: Skin is warm and dry.   Neurological:      General: No focal deficit present.      Mental Status: He is alert and oriented to person, place, and time.   Psychiatric:         Attention and Perception: Attention normal.         Mood and Affect: Mood normal.         Behavior: Behavior normal.             Labs    Recent Labs     03/12/24  0451 03/13/24  0018  03/14/24  0338   WBC 8.4  8.4 8.8 7.9   RBC 4.47* 4.37* 4.62*   HEMOGLOBIN 12.7*  12.7* 12.7* 13.2*   HEMATOCRIT 38.9*  38.9* 37.6* 40.6*   MCV 87.0  87.0 86.0 87.9   MCH 28.4  28.4 29.1 28.6   MCHC 32.6  32.6 33.8 32.5   RDW 45.0 44.3 44.9   PLATELETCT 127*  127* 130* 143*   MPV 11.8 10.9 10.9     Recent Labs     03/12/24  0451 03/13/24 0018 03/14/24  0338   SODIUM 134* 138 141   POTASSIUM 4.4 3.9 3.8   CHLORIDE 102 102 102   CO2 20 22 28   GLUCOSE 241* 200* 155*   BUN 80* 70* 58*   CREATININE 2.93* 2.45* 1.91*   CALCIUM 8.0* 8.2* 8.6     Recent Labs     03/12/24  0451 03/13/24 0018 03/14/24 0338   CREATININE 2.93* 2.45* 1.91*     CT-SHOULDER W/O LEFT   Final Result      1.  Large complex LEFT axillary fluid collection consistent with abscess, with drain in place, slightly decreased in size from prior.   2.  Overlying skin thickening again seen.      US-EXTREMITY VENOUS UPPER UNILAT LEFT   Final Result      CT-DRAIN-SKIN - SUBCUTANEOUS   Final Result      1. Ultrasound GUIDED PLACEMENT OF A PERCUTANEOUS DRAINAGE CATHETER IN A LEFT AXILLARY FLUID COLLECTION.      2.  THE CURRENT PLAN IS TO MONITOR DRAINAGE OUTPUT AND OBTAIN A FOLLOWUP CT SCAN IN 5-7 DAYS IF CLINICALLY INDICATED.      US-RENAL   Final Result      1.  Normal appearance of the right lower quadrant transplant kidney.   2.  Native left kidney demonstrates no hydronephrosis. There are innumerable cysts with very little normal-appearing parenchyma.   3.  Rounded echogenic masslike area in the inferior bladder. It is uncertain if this is related to an enlarged prostate gland protruding into the bladder base or a bladder mass. Recommend clinical correlation with history of hematuria.      CT-CHEST (THORAX) W/O   Final Result      1.  Partially visualized large left axillary mass and necrotic adenopathy is consistent with known squamous cell carcinoma. Central hypodensity in the larger mass may be related to necrosis.      2.  0.9 cm pleural-based  nodule in the right lower lobe is nonspecific, possibly present on the prior exam where there were patchy opacities. Follow-up per oncology protocol      3.  Patchy groundglass density and subsegmental atelectasis appears slightly improved from the prior CT. Findings are consistent with a chronic inflammatory/infectious process.      Fleischner Society pulmonary nodule recommendations:   Not Applicable         CT-DRAINAGE-CATH EXCHANGE    (Results Pending)     INR   Date Value Ref Range Status   03/06/2024 1.34 (H) 0.87 - 1.13 Final     Comment:     INR - Non-therapeutic Reference Range: 0.87-1.13  INR - Therapeutic Reference Range: 2.0-4.0       POC INR   Date Value Ref Range Status   08/03/2022 2.1 (H) 0.9 - 1.2 Final     Comment:     INR - Non-therapeutic Reference Range: 0.9-1.2  INR - Therapeutic Reference Range: 2.0-4.0          Intake/Output Summary (Last 24 hours) at 3/8/2024 0833  Last data filed at 3/8/2024 0655  Gross per 24 hour   Intake 100 ml   Output 1025 ml   Net -925 ml      Labs not explicitly included in this progress note were reviewed by the author. Radiology/imaging not explicitly included in this progress note was reviewed by the author.     I have performed a physical exam and reviewed and updated ROS and Plan today (3/14/2024).     31 minutes in directly providing and coordinating care and extensive data review.  No time overlap and excludes procedures.

## 2024-03-14 NOTE — CARE PLAN
Problem: Pain - Standard  Goal: Alleviation of pain or a reduction in pain to the patient’s comfort goal  Outcome: Progressing     Problem: Knowledge Deficit - Standard  Goal: Patient and family/care givers will demonstrate understanding of plan of care, disease process/condition, diagnostic tests and medications  Outcome: Progressing   The patient is Watcher - Medium risk of patient condition declining or worsening    Shift Goals  Clinical Goals: NICKI drain management; dressing change; iv abx  Patient Goals: rest  Family Goals: maryellen    Progress made toward(s) clinical / shift goals:  Pt medicated per MAR    Patient is not progressing towards the following goals:

## 2024-03-14 NOTE — PROGRESS NOTES
Infectious Disease Progress Note    Author: Tania Briggs M.D. Date & Time of service: 3/14/2024  11:32 AM    Chief Complaint:  renal transplant  Skin cancer  Wound infection    Interval History:   68 y.o. male with PCKD and DM s/p renal transplant  admitted 3/6/2024 for nonhealing wound left axilla.  3/11 AF WBC 10 possible removal drain today-creat 3  3/12 AF WBC 8.4 minimal output from drain but per repeat CT large complex residual abscess. Platelets up to 127  3/14 AF WBC 7.9 plan for upsize drain today noted-renal function improved. Tolerating abx well so far  Labs Reviewed, Medications Reviewed, and Wound Reviewed.    Review of Systems:  Review of Systems   Constitutional:  Negative for fever.   Respiratory:  Negative for cough and shortness of breath.    Gastrointestinal:  Negative for nausea and vomiting.   Musculoskeletal:  Positive for myalgias.   All other systems reviewed and are negative.      Hemodynamics:  Temp (24hrs), Av.4 °C (97.6 °F), Min:36.4 °C (97.5 °F), Max:36.5 °C (97.7 °F)  Temperature: 36.4 °C (97.6 °F)  Pulse  Av  Min: 54  Max: 126   Blood Pressure : (!) 145/92       Physical Exam:  Physical Exam  Vitals and nursing note reviewed.   Constitutional:       General: He is not in acute distress.     Appearance: He is not ill-appearing, toxic-appearing or diaphoretic.   Eyes:      General: No scleral icterus.     Extraocular Movements: Extraocular movements intact.      Pupils: Pupils are equal, round, and reactive to light.   Cardiovascular:      Rate and Rhythm: Normal rate.   Pulmonary:      Effort: Pulmonary effort is normal. No respiratory distress.      Breath sounds: No stridor.   Abdominal:      General: There is no distension.   Musculoskeletal:      Comments: Nearly resolved swelling and erythema LUE/chest  Drain thick serosanguinous   Skin:     Coloration: Skin is not jaundiced.      Findings: Bruising present.   Neurological:      General: No focal deficit  present.      Mental Status: He is alert and oriented to person, place, and time.   Psychiatric:         Mood and Affect: Mood normal.         Behavior: Behavior normal.         Meds:    Current Facility-Administered Medications:     hydrocortisone sodium succinate PF **FOLLOWED BY** [COMPLETED] hydrocortisone sodium succinate PF    insulin GLARGINE **AND** insulin lispro **AND** POC blood glucose manual result **AND** NOTIFY MD and PharmD **AND** Administer 20 grams of glucose (approximately 8 ounces of fruit juice) every 15 minutes PRN FSBG less than 70 mg/dL **AND** dextrose bolus    [START ON 3/16/2024] predniSONE    furosemide    famotidine    sodium bicarbonate    gabapentin    ampicillin-sulbactam (UNASYN) IV    [Held by provider] heparin    normal saline flush    linezolid    atorvastatin    [Held by provider] metoprolol SR    [Held by provider] omeprazole    tacrolimus    acetaminophen    senna-docusate **AND** polyethylene glycol/lytes    ondansetron    ondansetron    Labs:  Recent Labs     03/12/24 0451 03/13/24 0018 03/14/24 0338   WBC 8.4  8.4 8.8 7.9   RBC 4.47* 4.37* 4.62*   HEMOGLOBIN 12.7*  12.7* 12.7* 13.2*   HEMATOCRIT 38.9*  38.9* 37.6* 40.6*   MCV 87.0  87.0 86.0 87.9   MCH 28.4  28.4 29.1 28.6   RDW 45.0 44.3 44.9   PLATELETCT 127*  127* 130* 143*   MPV 11.8 10.9 10.9     Recent Labs     03/12/24 0451 03/13/24 0018 03/14/24  0338   SODIUM 134* 138 141   POTASSIUM 4.4 3.9 3.8   CHLORIDE 102 102 102   CO2 20 22 28   GLUCOSE 241* 200* 155*   BUN 80* 70* 58*     Recent Labs     03/12/24 0451 03/13/24 0018 03/14/24  0338   CREATININE 2.93* 2.45* 1.91*       Imaging:  US-EXTREMITY VENOUS UPPER UNILAT LEFT    Result Date: 3/8/2024   Upper Extremity  Venous Duplex Report  Vascular Laboratory  CONCLUSIONS  1.  No left upper extremity DVT or SVT.  2.  There is subcutaneous edema.  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  Limitations:  PROCEDURES:  Left upper extremity venous duplex imaging.  The following venous structures were evaluated: internal jugular,  subclavian, axillary, brachial, cephalic, and basilic veins.  Serial compression, color, and spectral Doppler flow evaluations were  performed.  FINDINGS:  Left upper extremity.  All veins demonstrate complete color filling and compressibility with  normal venous flow dynamics including spontaneous flow and respiratory  phasicity.  No superficial or deep venous thrombosis.  Flow was evaluated in the contralateral subclavian vein and normal venous  flow dynamics including spontaneous flow and respiratory phasic variation  were demonstrated.  Johnna Fitch  (Electronically Signed)  Final Date:      2024                   10:30    CT-DRAIN-SKIN - SUBCUTANEOUS    Result Date: 3/8/2024  3/7/2024 11:56 AM HISTORY/REASON FOR EXAM: Left axillary fluid collection. TECHNIQUE/EXAM DESCRIPTION: Left axillary abscess drainage with ultrasound guidance. PROCEDURE: Informed consent was obtained. Localizing ultrasound images were obtained with the patient in supine position. The skin was prepped with ChloraPrep and draped in a sterile fashion. Sedation was not administered. Intraservice time was 15 minutes. Following local anesthesia with 1% Lidocaine, a 17 G guiding needle was placed and needle path confirmed with  CT. An Amplatz guidewire was placed and following serial tract dilatation, a 12 Greenlandic pigtail locking catheter was placed. A specimen was collected and submitted for culture and sensitivity and Gram stain. Total of 420 mL bloody malodorous fluid was drained. The catheter was secured to the skin and connected to suction bulb drainage. Final ultrasound images were obtained documenting catheter position. The patient tolerated the procedure well with no evidence of complication. COMPARISON: None available. FINDINGS: The final ultrasound images show satisfactory catheter position within the target collection.     1. Ultrasound GUIDED PLACEMENT OF A PERCUTANEOUS DRAINAGE CATHETER IN A LEFT AXILLARY FLUID COLLECTION. 2.  THE CURRENT PLAN IS TO MONITOR DRAINAGE OUTPUT AND OBTAIN A FOLLOWUP CT SCAN IN 5-7 DAYS IF CLINICALLY INDICATED.    US-RENAL    Result Date: 3/7/2024  3/7/2024 9:21 AM HISTORY/REASON FOR EXAM:  Abnormal Labs TECHNIQUE/EXAM DESCRIPTION: Renal ultrasound. COMPARISON:  None FINDINGS: The right lower quadrant transplant kidney measures 11.96 cm.  The right kidney appears normal in contour and parenchymal echotexture. The corticomedullary differentiation is preserved. The right renal collecting system is not dilated. No hydronephrosis.  There are no renal calculi. The left native kidney measures 14.77 cm. There are innumerable cysts. There is very little normal-appearing parenchyma in the left kidney. The left renal collecting system is not dilated. No hydronephrosis. There are no renal calculi. The bladder demonstrates a rounded echogenic masslike focus with some minimal peripheral vascularity at the base abutting the wall of the inferior bladder measuring 1.7 x 1.9 x 1.7 cm. Bladder volume is 280 mL. The patient is unable to void during the examination.     1.  Normal appearance of the right lower quadrant transplant kidney. 2.  Native left kidney demonstrates no hydronephrosis. There are innumerable cysts  with very little normal-appearing parenchyma. 3.  Rounded echogenic masslike area in the inferior bladder. It is uncertain if this is related to an enlarged prostate gland protruding into the bladder base or a bladder mass. Recommend clinical correlation with history of hematuria.    CT-CHEST (THORAX) W/O    Result Date: 3/6/2024  3/6/2024 8:42 PM HISTORY/REASON FOR EXAM:  L axilla squamous cell cancer, now with signs of infection. H/O CKD and renal transplant. TECHNIQUE/EXAM DESCRIPTION: CT scan of the chest without contrast. Noncontrast helical scanning of the chest was obtained from the lung apices through the adrenal glands. Low dose optimization technique was utilized for this CT exam including automated exposure control and adjustment of the mA and/or kV according to patient size. COMPARISON: 5/24/2022 FINDINGS: Lungs: Patchy groundglass density and subsegmental atelectasis, slightly improved from the prior CT. A small pleural-based nodule in the right lower lobe measures approximately 0.9 cm. Mediastinum/Isadora: No significant adenopathy. Pleura: No pleural effusion. Cardiac: Mild cardiomegaly. A watchman's device is in the left atrial appendage. There are coronary artery calcifications. Vascular: Nonaneurysmal aorta with scattered arterial calcifications. Soft tissues: A partially visualized large left axillary mass measures at least 6.6 x 11.3 cm. There is an enlarged necrotic left axillary lymph node measuring approximately 2 cm short axis. There is stranding in the adjacent soft tissues. The overlying skin is only partially visualized but appears thickened. Upper abdomen: There are innumerable hypodense and hyperdense cysts in the partially visualized left kidney Bones: No suspicious bony lesions.     1.  Partially visualized large left axillary mass and necrotic adenopathy is consistent with known squamous cell carcinoma. Central hypodensity in the larger mass may be related to necrosis. 2.  0.9 cm  pleural-based nodule in the right lower lobe is nonspecific, possibly present on the prior exam where there were patchy opacities. Follow-up per oncology protocol 3.  Patchy groundglass density and subsegmental atelectasis appears slightly improved from the prior CT. Findings are consistent with a chronic inflammatory/infectious process. Fleischner Society pulmonary nodule recommendations: Not Applicable     EC-RADHA W/O CONT    Result Date: 2024  Transesophageal Echo Report Echocardiography Laboratory CONCLUSIONS The watchman device is well seated within the left atrial appendage without leakage. CIARA FORRESTER Exam Date:          2024                     10:58 Exam Location:      Inpatient Priority:            Routine Ordering Physician:        NAYELI CHAPMAN                             ([id number]) Referring Physician:       ADELA Killian Sonographer:               Gloria Hill [facility] Age:    68     Gender:    M MRN:    [id number] :    1955 BSA:           Ht (in):           Wt (lb): Report Type:      Complete Indications:     Arrhythmia ICD Codes:       427.9 CPT Codes:       87950 BP:          /          HR: Technical Quality:       Fair MEASUREMENTS  (Male / Female) Normal Values * Indicates values subject to auto-interpretation LV EF:        % Medications Limitations Complications Proc. Components Epiq probe #  [id number] was used for this procedure. The probe was inserted and manipulated by Panda MONIQUE. FINDINGS Left Ventricle Right Ventricle Right Atrium Left Atrium LA Appendage The watchman device is well seated within the left atrial appendage without leakage. IA Septum IV Septum Mitral Valve Aortic Valve Tricuspid Valve Pulmonic Valve Pericardium Aorta Jaspreet N To (Electronically Signed) Final Date:     2024                 11:47    IR-NEEDLE BX-LYMPH NODE    Result Date: 2024 8:46 AM HISTORY/REASON FOR EXAM:  Left axillary mass Procedure: After the informed consent the patient  was placed on the ultrasound table and supine position. Ultrasound examination of the left axilla demonstrates a large necrotic lesion. Subsequently, after injecting lidocaine, a 19-gauge needle was advanced into the fluid collection. An approximately 250 mL of fluid was collected and sent it to the cytology and microbiology. Subsequently 2 core biopsies were obtained using 18-gauge biopsy needle. The patient tolerated procedure without any immediate complication.     Ultrasound-guided aspiration and biopsy of left axillary necrotic lesion.    CT-SHOULDER WITH PLUS RECONS LEFT    Result Date: 2/20/2024 2/19/2024 4:29 PM HISTORY/REASON FOR EXAM:  Squamous cell carcinoma of the skin of left upper limb, including shoulder. TECHNIQUE/ EXAM DESCRIPTION AND NUMBER OF VIEWS:  CT scan of the LEFT shoulder with contrast with reconstructions. Axial spiral CT images were obtained of the upper extremity from the shoulder through the proximal third of the humerus. Images were reviewed at soft tissue and bone windows with administration of 165 mL of Omnipaque 350 nonionic contrast without complication. Sagittal reformations were then generated. Up to date radiation dose reduction adjustments have been utilized to meet ALARA standards for radiation dose reduction. COMPARISON: CTA chest 5/24/2022 FINDINGS: There is a 10 x 10 cm peripherally enhancing mass in the left axilla on image 79 series 2. This abuts the chest wall and dermis. There are a few adjacent small similar-appearing masses in the high left axilla on images 48, 56, and 62. Largest of these additional similar-appearing masses measures 2.3 cm. There is a new 6 mm left upper lobe nodule image 217 series 2. Mild scarring in the left lung. Mild DJD of the left glenohumeral joint and AC joint. No focal osseous lesions seen.     1. 10 x 10 cm left axillary mass, which could represent metastatic adenopathy or primary mass. 2. There are a few adjacent similar-appearing left  axillary masses which are consistent with adenopathy, largest 2.3 cm. 3. 6 mm indeterminate left upper lobe pulmonary nodule. Recommend formal chest CT follow-up. 4. Mild DJD of the AC joint and glenohumeral joint.      Micro:  Results       Procedure Component Value Units Date/Time    Blood Culture - Draw one from central line and one from peripheral site [[id number]] Collected: 03/06/24 2227    Order Status: Completed Specimen: Blood from Line Updated: 03/12/24 0100     Significant Indicator NEG     Source BLD     Site Peripheral     Culture Result No growth after 5 days of incubation.    Narrative:      Blood Cultures X2. Blood Cultures X2. Draw one blood culture  from central line and one blood culture peripherally. If no  line present, draw blood cultures times two peripherally from  different sites.  No site indicated    Blood Culture - Draw one from central line and one from peripheral site [[id number]] Collected: 03/06/24 2025    Order Status: Completed Specimen: Blood from Peripheral Updated: 03/11/24 2300     Significant Indicator NEG     Source BLD     Site PERIPHERAL     Culture Result No growth after 5 days of incubation.    Narrative:      Blood Cultures X2. Draw one blood culture from central line  and one blood culture peripherally. If no line present, draw  blood cultures times two peripherally from different sites.  No site indicated    URINALYSIS [[id number]]  (Abnormal) Collected: 03/11/24 1843    Order Status: Completed Specimen: Urine, Clean Catch Updated: 03/11/24 1904     Color Yellow     Character Clear     Specific Gravity 1.008     Ph 5.0     Glucose 100 mg/dL      Ketones Negative mg/dL      Protein Negative mg/dL      Bilirubin Negative     Urobilinogen, Urine 0.2     Nitrite Negative     Leukocyte Esterase Negative     Occult Blood Negative     Micro Urine Req see below     Comment: Microscopic examination not performed when specimen is clear  and chemically negative for protein, blood,  leukocyte esterase  and nitrite.         Narrative:      Contact  Collected By: [id number] MIROSLAVA CHRISTIANSON    URINALYSIS [[id number]]     Order Status: Canceled Specimen: Urine     Anaerobic Culture [[id number]]  (Abnormal) Collected: 03/07/24 1206    Order Status: Completed Specimen: Wound Updated: 03/10/24 1358     Significant Indicator POS     Source WND     Site L Axillary Fluid     Culture Result -      Finegoldia magna  Heavy growth      Narrative:      Left axillary fluid aspirate  Left axillary fluid aspirate    CULTURE WOUND W/ GRAM STAIN [[id number]]  (Abnormal)  (Susceptibility) Collected: 03/07/24 1206    Order Status: Completed Specimen: Wound Updated: 03/10/24 1358     Significant Indicator POS     Source WND     Site L Axillary Fluid     Culture Result -     Gram Stain Result Rare WBCs.  Moderate Gram positive cocci.       Culture Result Methicillin Resistant Staphylococcus aureus  Heavy growth  This isolate is presumed to be clindamycin resistant based on  detection of inducible resistance.        Enterococcus faecalis  Heavy growth      Narrative:      Left axillary fluid aspirate  Left axillary fluid aspirate    Susceptibility       Methicillin resistant staphylococcus aureus (1)       Antibiotic Interpretation Microscan   Method Status    Clindamycin Resistant 0.5 mcg/mL CESIA Final    Cefazolin Resistant <=8 mcg/mL CESIA Final    Cefepime Resistant 8 mcg/mL CESIA Final    Daptomycin Sensitive <=0.5 mcg/mL CESIA Final    Erythromycin Resistant >4 mcg/mL CESIA Final    Ampicillin/sulbactam Resistant <=8/4 mcg/mL CESIA Final    Linezolid Sensitive 2 mcg/mL CESIA Final    Oxacillin Resistant >2 mcg/mL CESIA Final    Trimeth/Sulfa Sensitive <=0.5/9.5 mcg/mL CESIA Final    Tetracycline Resistant >8 mcg/mL CESIA Final    Vancomycin Sensitive 1 mcg/mL CESIA Final              Enterococcus faecalis (2)       Antibiotic Interpretation Microscan   Method Status    Ampicillin Sensitive <=2 mcg/mL CESIA Final    Daptomycin Sensitive 2  mcg/mL CESIA Final    Linezolid Sensitive 2 mcg/mL CESIA Final    Tetracycline Resistant >8 mcg/mL CESIA Final    Vancomycin Sensitive 1 mcg/mL CESIA Final    Gent Synergy Sensitive <=500 mcg/mL CESIA Final    Penicillin Sensitive 2 mcg/mL CESIA Final                       Urine Culture (New) [[id number]] Collected: 03/07/24 1733    Order Status: Completed Specimen: Urine Updated: 03/09/24 0833     Significant Indicator NEG     Source UR     Site -     Culture Result No growth at 48 hours.    Narrative:      Indication for culture:->Emergency Room Patient  Indication for culture:->Emergency Room Patient    GRAM STAIN [[id number]] Collected: 03/07/24 1206    Order Status: Completed Specimen: Wound Updated: 03/07/24 2031     Significant Indicator .     Source WND     Site L Axillary Fluid     Gram Stain Result Rare WBCs.  Moderate Gram positive cocci.      Narrative:      Left axillary fluid aspirate  Left axillary fluid aspirate    Urinalysis [[id number]]  (Abnormal) Collected: 03/07/24 1733    Order Status: Completed Specimen: Urine Updated: 03/07/24 1755     Color Yellow     Character Clear     Specific Gravity 1.017     Ph 5.0     Glucose Negative mg/dL      Ketones Trace mg/dL      Protein Negative mg/dL      Bilirubin Negative     Urobilinogen, Urine 0.2     Nitrite Negative     Leukocyte Esterase Negative     Occult Blood Negative     Micro Urine Req see below     Comment: Microscopic examination not performed when specimen is clear  and chemically negative for protein, blood, leukocyte esterase  and nitrite.         Narrative:      If not done within the last 24 hours    FLUID CULTURE W/GRAM STAIN [[id number]] Collected: 03/07/24 1206    Order Status: Canceled Specimen: Other Body Fluid     Aerobic/Anaerobic Culture (Surgery) [[id number]] Collected: 03/07/24 1206    Order Status: Canceled Specimen: Other Body Fluid     MRSA By PCR (Amp) [[id number]] Collected: 03/07/24 0215    Order Status: Completed Specimen: Respirate from  Shonda Updated: 03/07/24 1140     MRSA by PCR POSITIVE    Narrative:      If not done within the last 24 hours            Assessment:  Active Hospital Problems    Diagnosis     *Abscess of axilla, left [L02.412]     ACP (advance care planning) [Z71.89]     Adrenal insufficiency (HCC) [E27.40]     Axillary mass, left [R22.32]     Septic shock (HCC) [A41.9, R65.21]     Peripheral neuropathy [G62.9]     Squamous cell carcinoma of skin [C44.92]     Severe sepsis (HCC) [A41.9, R65.20]     Presence of Watchman left atrial appendage closure device [Z95.818]     Abdominal aortic aneurysm (AAA) without rupture (HCC) [I71.40]     Immunosuppressed status (HCC) [D84.9]     Acute hypoxic respiratory failure (HCC) [J96.01]     AF (atrial fibrillation) (HCC) [I48.91]     Type 2 diabetes mellitus (HCC) [E11.9]     Thrombocytopenia (HCC) [D69.6]     GERD (gastroesophageal reflux disease) [K21.9]     Primary hypertension [I10]     Acute kidney injury superimposed on chronic kidney disease (HCC) [N17.9, N18.9]     Coronary artery disease [I25.10]     Hyperlipidemia [E78.5]     History of renal transplant [Z94.0]      SCCA of the left chest/axilla  Wound infection with abscess after biopsy  -polymicrobial MRSA, Efaecalis, Fingoldia  -s/p drain 3/7  -CT 3/11 : Irregular fluid collection in the LEFT axillary subcutaneous soft tissues measuring 8.3 x 5.1 x 11.1 cm with gas bubbles and drainage catheter in place.   SANKET  S/p renal transplant-Nephrology eval appreciated  Thrombocytopenia      PLAN:   Continue Zyvox for MRSA  Continue Unasyn for treatment Efaecalis and Fingoldia  Adjust abx for renal insuff  Monitor CBC on Zyvox-has thrombocytopenia  Drain per IR  Plan to re-eval for possibility of surgical drainage if cannot be managed more conservatively by IR-has known poor wound healing  Anticipate 10-14 days course antibiotics from date of adequate drainage  Plan to adjust immunosuppressives if needed due to need for XRT/treatment SCCA      Midline: No- oral options are available

## 2024-03-14 NOTE — PROGRESS NOTES
[facility] Medicine Daily Progress Note    Date of Service  3/14/2024    Chief Complaint  Jama Altman is a 68 y.o. male admitted 3/6/2024 with weakness.    Hospital Course  Jama Altman is a 68 y.o. male w/ a hx of afib recent watchman procedure,  CAD, renal transplant 2010 (hx of polycystic kidney disease), HLD, HTN and PAD.  He was admitted 3/6/2024 with weakness.  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  He had IR place a drain in left axilla 3/7.  A CT scan showed a large left axillary mass with necrosis.  He was hypotensive and received boluses and admitted with septic shock.     S/p IR drain placement 3/7. Repeat shoulder CT 3/11: large, complex left axillary fluid collection. Slightly decreased in size from prior imaging.     Discussed with IR. Given 110mL output overnight; no drain adjustment at this time.     Interval Problem Update  3/12: Per nursing report 110mL out through drain overnight. Patient is up, walking through the halls. No worsening pain. No acute overnight events. Discussed with IR and ID at bedside.     3/13: No acute overnight events.  Drain with 55 mL serosanguineous overnight. No acute overnight events. Afebrile, vitals stable. WBC 8.8, from 8.4. Hb 12.7 from 12.7. Potassium 3.9. Creatinine 2.45 from 2.93.    3/14: No acute overnight events. Drain with 40ml out. Afebrile. Vitals normal and stable. WBC stable at 7.9. Hb 13. Creatinine 1.91 from 2.45. NPO for drain adjustment and up sizing today. Consider discharge home tomorrow with thorough education and close outpatient follow up.     I have discussed this patient's plan of care and discharge plan at IDT rounds today with Case Management, Nursing, Nursing leadership, and other members of the IDT team.    Consultants/Specialty  infectious disease, oncology, and IR    Code Status  Full Code    Disposition  Medically Cleared  I have placed the appropriate orders for  post-discharge needs.    Review of Systems  Review of Systems   Constitutional:  Negative for chills and fever.   Respiratory:  Negative for cough and shortness of breath.    Cardiovascular:  Negative for chest pain.   Gastrointestinal:  Negative for nausea and vomiting.   Musculoskeletal:  Positive for joint pain.        Physical Exam  Temp:  [36.4 °C (97.5 °F)-36.5 °C (97.7 °F)] 36.4 °C (97.6 °F)  Pulse:  [60-99] 99  Resp:  [16-26] 26  BP: (127-153)/(86-96) 153/93  SpO2:  [91 %-98 %] 98 %    Physical Exam  Vitals and nursing note reviewed.   Constitutional:       General: He is not in acute distress.     Appearance: Normal appearance. He is not ill-appearing.   HENT:      Head: Normocephalic.   Cardiovascular:      Rate and Rhythm: Normal rate and regular rhythm.      Heart sounds: Murmur heard.   Pulmonary:      Effort: Pulmonary effort is normal. No respiratory distress.      Breath sounds: Normal breath sounds. No wheezing.   Abdominal:      Tenderness: There is no abdominal tenderness.   Musculoskeletal:         General: Swelling and tenderness present. Normal range of motion.      Cervical back: Normal range of motion. No tenderness.      Comments: NICKI drain to left axilla    Skin:     General: Skin is warm and dry.   Neurological:      Mental Status: He is alert and oriented to person, place, and time.   Psychiatric:         Mood and Affect: Mood normal.         Behavior: Behavior normal.         Fluids    Intake/Output Summary (Last 24 hours) at 3/14/2024 1539  Last data filed at 3/14/2024 0800  Gross per 24 hour   Intake 200 ml   Output 3275 ml   Net -3075 ml       Laboratory  Recent Labs     03/12/24  0451 03/13/24  0018 03/14/24  0338   WBC 8.4  8.4 8.8 7.9   RBC 4.47* 4.37* 4.62*   HEMOGLOBIN 12.7*  12.7* 12.7* 13.2*   HEMATOCRIT 38.9*  38.9* 37.6* 40.6*   MCV 87.0  87.0 86.0 87.9   MCH 28.4  28.4 29.1 28.6   MCHC 32.6  32.6 33.8 32.5   RDW 45.0 44.3 44.9   PLATELETCT 127*  127* 130* 143*   MPV  11.8 10.9 10.9     Recent Labs     03/12/24  0451 03/13/24  0018 03/14/24  0338   SODIUM 134* 138 141   POTASSIUM 4.4 3.9 3.8   CHLORIDE 102 102 102   CO2 20 22 28   GLUCOSE 241* 200* 155*   BUN 80* 70* 58*   CREATININE 2.93* 2.45* 1.91*   CALCIUM 8.0* 8.2* 8.6                   Imaging  CT-SHOULDER W/O LEFT   Final Result      1.  Large complex LEFT axillary fluid collection consistent with abscess, with drain in place, slightly decreased in size from prior.   2.  Overlying skin thickening again seen.      US-EXTREMITY VENOUS UPPER UNILAT LEFT   Final Result      CT-DRAIN-SKIN - SUBCUTANEOUS   Final Result      1. Ultrasound GUIDED PLACEMENT OF A PERCUTANEOUS DRAINAGE CATHETER IN A LEFT AXILLARY FLUID COLLECTION.      2.  THE CURRENT PLAN IS TO MONITOR DRAINAGE OUTPUT AND OBTAIN A FOLLOWUP CT SCAN IN 5-7 DAYS IF CLINICALLY INDICATED.      US-RENAL   Final Result      1.  Normal appearance of the right lower quadrant transplant kidney.   2.  Native left kidney demonstrates no hydronephrosis. There are innumerable cysts with very little normal-appearing parenchyma.   3.  Rounded echogenic masslike area in the inferior bladder. It is uncertain if this is related to an enlarged prostate gland protruding into the bladder base or a bladder mass. Recommend clinical correlation with history of hematuria.      CT-CHEST (THORAX) W/O   Final Result      1.  Partially visualized large left axillary mass and necrotic adenopathy is consistent with known squamous cell carcinoma. Central hypodensity in the larger mass may be related to necrosis.      2.  0.9 cm pleural-based nodule in the right lower lobe is nonspecific, possibly present on the prior exam where there were patchy opacities. Follow-up per oncology protocol      3.  Patchy groundglass density and subsegmental atelectasis appears slightly improved from the prior CT. Findings are consistent with a chronic inflammatory/infectious process.      Fleischner Society  pulmonary nodule recommendations:   Not Applicable         CT-DRAINAGE-CATH EXCHANGE    (Results Pending)        Assessment/Plan  * Abscess of axilla, left  Assessment & Plan  Secondary to necrotic squamous cell cancer mass  S/p IR drain placement 3/7  Wound culture positive for  Finegoldia magna, MRSA, Group D Enterococcus.     3/14:  -Discussed with IR - upsize and reposition drain 3/14  - Consider discharge home tomorrow with drain in place and close outpatient follow up   - Drain with 40mL out overnight; serosanguinous   - Continue discussions for anbx duration and route     Squamous cell carcinoma of skin  Assessment & Plan  Of left axilla per biopsy on 2/23/24    -Per oncology -  plan outpatient PET scan, immunotherapy and radiation therapy    Immunosuppressed status (HCC)- (present on admission)  Assessment & Plan  Per [facility] transplant team Dr. Carly Becerril ([phone]) okay to continue tacrolimus for now but hold mycophenolate  - Nephrology following    Acute kidney injury superimposed on chronic kidney disease (HCC)- (present on admission)  Assessment & Plan  Improving   Creatinine 1.91 (baseline Cr ~2.0)  U/S renal did not show any hydronephrosis nor any signs of active rejection    - Hold cellcept indefinetly due to active cancer  - Continue tacrolimus  - Continue stress steroids; discuss duration with team tomorrow  - Increase IV lasix to 80mg as blood pressure allows  - Daily BMP to monitor for electrolyte abnormalities in the setting of forced diuresis   - Resume home prednisone 5mg after stress steroids  - Nephrology following    Type 2 diabetes mellitus (HCC)- (present on admission)  Assessment & Plan  A1C 2/16/2024 6.9  - Elevated glucose secondary to stress dosing of steroids  - Glucose ranging 200-350s  - Tapering steroid down, continue lantus once daily with SSI   - Consider restarting home medication in the near future  - Monitor BG trend.   - Accu-Cheks before meals and at bedtime.   -  Goal to keep BG between 140-180 per 2019 ADA guidelines      Thrombocytopenia (HCC)- (present on admission)  Assessment & Plan  Possibly secondary to sepsis  Improving  Daily CBC    History of renal transplant- (present on admission)  Assessment & Plan  2/2 polycystic kidney disease s/p right renal transplant in 2010  - Nephrology following  - Resume Tacrolimus  - Hold Cellcept due to active cancer  - Finish stress dosing of steroids; resume post transplant home prednisone 5mg daily after    Hyponatremia  Assessment & Plan  Resolved     Adrenal insufficiency (HCC)  Assessment & Plan  Due to chronic steroid use  On hydrocotisone.    ACP (advance care planning)  Assessment & Plan   The patient is willing to  have all life sustaining efforts. Continue full code    Peripheral neuropathy  Assessment & Plan  Continue gabapentin 400mg BID    Septic shock (HCC)- (present on admission)  Assessment & Plan  Resolved     Presence of Watchman left atrial appendage closure device- (present on admission)  Assessment & Plan  Afib, s/p watchman procedure on 1/9/24 off anticoagulation  Rate controlled    Abdominal aortic aneurysm (AAA) without rupture (HCC)- (present on admission)  Assessment & Plan  History of, 3.2 cm on 1/27/23  Monitor blood pressure  Continue to monitor    Acute hypoxic respiratory failure (HCC)- (present on admission)  Assessment & Plan  Resolved    AF (atrial fibrillation) (HCC)- (present on admission)  Assessment & Plan  History of, in SR, s/p watchman procedure on 1/2024, off anticoagulation  Metoprolol on hold due to low bp and adrenal insufficiency   Monitor HR    GERD (gastroesophageal reflux disease)- (present on admission)  Assessment & Plan  Continue Omeprazole 20 mg daily    Coronary artery disease- (present on admission)  Assessment & Plan  S/p MI with 2 stents in 2011  Continue to hold aspirin 81 mg daily for ongoing NICKI drain and abscess management  Continue Atorvastatin 80 mg daily  Hold Metoprolol  due to hypotension    Hyperlipidemia- (present on admission)  Assessment & Plan  Atorvastatin 80 mg for ongoing active treatment    Primary hypertension- (present on admission)  Assessment & Plan  Hypotension during admission  Continue to hold home metoprolol         VTE prophylaxis: VTE Selection    I have performed a physical exam and reviewed and updated ROS and Plan today (3/14/2024). In review of yesterday's note (3/13/2024), there are no changes except as documented above.

## 2024-03-14 NOTE — CARE PLAN
The patient is Stable - Low risk of patient condition declining or worsening    Shift Goals  Clinical Goals: manage marisela drain  Patient Goals: rest  Family Goals: maryellen    Progress made toward(s) clinical / shift goals:  patient resting in bed. A&O X4. AMB. RA. No acute events overnight    Patient is not progressing towards the following goals:

## 2024-03-15 ENCOUNTER — HOME HEALTH ADMISSION (OUTPATIENT)
Dept: HOME HEALTH SERVICES | Facility: HOME HEALTHCARE | Age: 69
End: 2024-03-15
Payer: MEDICARE

## 2024-03-15 LAB
ANION GAP SERPL CALC-SCNC: 14 MMOL/L (ref 7–16)
BUN SERPL-MCNC: 55 MG/DL (ref 8–22)
CALCIUM SERPL-MCNC: 8.3 MG/DL (ref 8.5–10.5)
CHLORIDE SERPL-SCNC: 99 MMOL/L (ref 96–112)
CO2 SERPL-SCNC: 29 MMOL/L (ref 20–33)
CREAT SERPL-MCNC: 2 MG/DL (ref 0.5–1.4)
ERYTHROCYTE [DISTWIDTH] IN BLOOD BY AUTOMATED COUNT: 43.8 FL (ref 35.9–50)
GFR SERPLBLD CREATININE-BSD FMLA CKD-EPI: 36 ML/MIN/1.73 M 2
GLUCOSE BLD STRIP.AUTO-MCNC: 170 MG/DL (ref 65–99)
GLUCOSE BLD STRIP.AUTO-MCNC: 216 MG/DL (ref 65–99)
GLUCOSE BLD STRIP.AUTO-MCNC: 221 MG/DL (ref 65–99)
GLUCOSE SERPL-MCNC: 173 MG/DL (ref 65–99)
HCT VFR BLD AUTO: 39.7 % (ref 42–52)
HGB BLD-MCNC: 13.3 G/DL (ref 14–18)
MCH RBC QN AUTO: 29 PG (ref 27–33)
MCHC RBC AUTO-ENTMCNC: 33.5 G/DL (ref 32.3–36.5)
MCV RBC AUTO: 86.7 FL (ref 81.4–97.8)
PLATELET # BLD AUTO: 133 K/UL (ref 164–446)
PMV BLD AUTO: 11.1 FL (ref 9–12.9)
POTASSIUM SERPL-SCNC: 3.5 MMOL/L (ref 3.6–5.5)
RBC # BLD AUTO: 4.58 M/UL (ref 4.7–6.1)
SODIUM SERPL-SCNC: 142 MMOL/L (ref 135–145)
WBC # BLD AUTO: 6.6 K/UL (ref 4.8–10.8)

## 2024-03-15 PROCEDURE — 700101 HCHG RX REV CODE 250: Performed by: NURSE PRACTITIONER

## 2024-03-15 PROCEDURE — 700102 HCHG RX REV CODE 250 W/ 637 OVERRIDE(OP): Performed by: INTERNAL MEDICINE

## 2024-03-15 PROCEDURE — A9270 NON-COVERED ITEM OR SERVICE: HCPCS

## 2024-03-15 PROCEDURE — 82962 GLUCOSE BLOOD TEST: CPT

## 2024-03-15 PROCEDURE — 770004 HCHG ROOM/CARE - ONCOLOGY PRIVATE *

## 2024-03-15 PROCEDURE — 80048 BASIC METABOLIC PNL TOTAL CA: CPT

## 2024-03-15 PROCEDURE — 36415 COLL VENOUS BLD VENIPUNCTURE: CPT

## 2024-03-15 PROCEDURE — 99232 SBSQ HOSP IP/OBS MODERATE 35: CPT | Performed by: STUDENT IN AN ORGANIZED HEALTH CARE EDUCATION/TRAINING PROGRAM

## 2024-03-15 PROCEDURE — 700102 HCHG RX REV CODE 250 W/ 637 OVERRIDE(OP): Performed by: STUDENT IN AN ORGANIZED HEALTH CARE EDUCATION/TRAINING PROGRAM

## 2024-03-15 PROCEDURE — A9270 NON-COVERED ITEM OR SERVICE: HCPCS | Performed by: INTERNAL MEDICINE

## 2024-03-15 PROCEDURE — 85027 COMPLETE CBC AUTOMATED: CPT

## 2024-03-15 PROCEDURE — A9270 NON-COVERED ITEM OR SERVICE: HCPCS | Performed by: STUDENT IN AN ORGANIZED HEALTH CARE EDUCATION/TRAINING PROGRAM

## 2024-03-15 PROCEDURE — 700111 HCHG RX REV CODE 636 W/ 250 OVERRIDE (IP): Mod: JZ | Performed by: STUDENT IN AN ORGANIZED HEALTH CARE EDUCATION/TRAINING PROGRAM

## 2024-03-15 PROCEDURE — 700111 HCHG RX REV CODE 636 W/ 250 OVERRIDE (IP): Mod: JZ | Performed by: INTERNAL MEDICINE

## 2024-03-15 PROCEDURE — 700102 HCHG RX REV CODE 250 W/ 637 OVERRIDE(OP): Mod: JZ | Performed by: STUDENT IN AN ORGANIZED HEALTH CARE EDUCATION/TRAINING PROGRAM

## 2024-03-15 PROCEDURE — A9270 NON-COVERED ITEM OR SERVICE: HCPCS | Mod: JZ | Performed by: STUDENT IN AN ORGANIZED HEALTH CARE EDUCATION/TRAINING PROGRAM

## 2024-03-15 PROCEDURE — 700102 HCHG RX REV CODE 250 W/ 637 OVERRIDE(OP)

## 2024-03-15 PROCEDURE — 99233 SBSQ HOSP IP/OBS HIGH 50: CPT | Performed by: INTERNAL MEDICINE

## 2024-03-15 PROCEDURE — 700111 HCHG RX REV CODE 636 W/ 250 OVERRIDE (IP): Performed by: HOSPITALIST

## 2024-03-15 PROCEDURE — 700105 HCHG RX REV CODE 258: Performed by: STUDENT IN AN ORGANIZED HEALTH CARE EDUCATION/TRAINING PROGRAM

## 2024-03-15 RX ORDER — TACROLIMUS 0.5 MG/1
0.5 CAPSULE ORAL EVERY EVENING
Status: DISCONTINUED | OUTPATIENT
Start: 2024-03-15 | End: 2024-03-16 | Stop reason: HOSPADM

## 2024-03-15 RX ORDER — LINEZOLID 600 MG/1
600 TABLET, FILM COATED ORAL EVERY 12 HOURS
Status: DISCONTINUED | OUTPATIENT
Start: 2024-03-15 | End: 2024-03-16 | Stop reason: HOSPADM

## 2024-03-15 RX ORDER — SODIUM BICARBONATE 650 MG/1
650 TABLET ORAL
Status: DISCONTINUED | OUTPATIENT
Start: 2024-03-15 | End: 2024-03-16 | Stop reason: HOSPADM

## 2024-03-15 RX ORDER — AMOXICILLIN AND CLAVULANATE POTASSIUM 875; 125 MG/1; MG/1
1 TABLET, FILM COATED ORAL EVERY 12 HOURS
Status: DISCONTINUED | OUTPATIENT
Start: 2024-03-15 | End: 2024-03-16 | Stop reason: HOSPADM

## 2024-03-15 RX ORDER — TACROLIMUS 1 MG/1
1 CAPSULE ORAL EVERY MORNING
Status: DISCONTINUED | OUTPATIENT
Start: 2024-03-16 | End: 2024-03-16 | Stop reason: HOSPADM

## 2024-03-15 RX ORDER — FAMOTIDINE 20 MG/1
20 TABLET, FILM COATED ORAL
Status: DISCONTINUED | OUTPATIENT
Start: 2024-03-15 | End: 2024-03-16 | Stop reason: HOSPADM

## 2024-03-15 RX ORDER — POTASSIUM CHLORIDE 20 MEQ/1
40 TABLET, EXTENDED RELEASE ORAL ONCE
Status: COMPLETED | OUTPATIENT
Start: 2024-03-15 | End: 2024-03-15

## 2024-03-15 RX ADMIN — TACROLIMUS 1 MG: 1 CAPSULE ORAL at 05:51

## 2024-03-15 RX ADMIN — ATORVASTATIN CALCIUM 80 MG: 80 TABLET, FILM COATED ORAL at 20:27

## 2024-03-15 RX ADMIN — GABAPENTIN 400 MG: 400 CAPSULE ORAL at 05:50

## 2024-03-15 RX ADMIN — TACROLIMUS 0.5 MG: 0.5 CAPSULE ORAL at 17:44

## 2024-03-15 RX ADMIN — AMOXICILLIN AND CLAVULANATE POTASSIUM 1 TABLET: 875; 125 TABLET, FILM COATED ORAL at 17:43

## 2024-03-15 RX ADMIN — ACETAMINOPHEN 650 MG: 325 TABLET, FILM COATED ORAL at 00:25

## 2024-03-15 RX ADMIN — SODIUM CHLORIDE, PRESERVATIVE FREE 10 ML: 5 INJECTION INTRAVENOUS at 06:03

## 2024-03-15 RX ADMIN — INSULIN LISPRO 3 UNITS: 100 INJECTION, SOLUTION INTRAVENOUS; SUBCUTANEOUS at 20:36

## 2024-03-15 RX ADMIN — FUROSEMIDE 80 MG: 10 INJECTION INTRAMUSCULAR; INTRAVENOUS at 05:50

## 2024-03-15 RX ADMIN — ACETAMINOPHEN 650 MG: 325 TABLET, FILM COATED ORAL at 10:01

## 2024-03-15 RX ADMIN — SODIUM BICARBONATE 1300 MG: 650 TABLET ORAL at 09:03

## 2024-03-15 RX ADMIN — LINEZOLID 600 MG: 600 TABLET, FILM COATED ORAL at 05:50

## 2024-03-15 RX ADMIN — POTASSIUM CHLORIDE 40 MEQ: 1500 TABLET, EXTENDED RELEASE ORAL at 09:03

## 2024-03-15 RX ADMIN — AMPICILLIN AND SULBACTAM 3 G: 1; 2 INJECTION, POWDER, FOR SOLUTION INTRAMUSCULAR; INTRAVENOUS at 06:03

## 2024-03-15 RX ADMIN — INSULIN LISPRO 3 UNITS: 100 INJECTION, SOLUTION INTRAVENOUS; SUBCUTANEOUS at 17:43

## 2024-03-15 RX ADMIN — GABAPENTIN 400 MG: 400 CAPSULE ORAL at 17:43

## 2024-03-15 RX ADMIN — SODIUM BICARBONATE 650 MG: 650 TABLET ORAL at 20:26

## 2024-03-15 RX ADMIN — INSULIN GLARGINE-YFGN 20 UNITS: 100 INJECTION, SOLUTION SUBCUTANEOUS at 17:43

## 2024-03-15 RX ADMIN — AMPICILLIN AND SULBACTAM 3 G: 1; 2 INJECTION, POWDER, FOR SOLUTION INTRAMUSCULAR; INTRAVENOUS at 00:22

## 2024-03-15 RX ADMIN — LINEZOLID 600 MG: 600 TABLET, FILM COATED ORAL at 17:43

## 2024-03-15 RX ADMIN — INSULIN LISPRO 2 UNITS: 100 INJECTION, SOLUTION INTRAVENOUS; SUBCUTANEOUS at 12:17

## 2024-03-15 RX ADMIN — HYDROCORTISONE SODIUM SUCCINATE 50 MG: 100 INJECTION, POWDER, FOR SOLUTION INTRAMUSCULAR; INTRAVENOUS at 05:50

## 2024-03-15 RX ADMIN — ACETAMINOPHEN 650 MG: 325 TABLET, FILM COATED ORAL at 20:29

## 2024-03-15 RX ADMIN — SODIUM CHLORIDE, PRESERVATIVE FREE 10 ML: 5 INJECTION INTRAVENOUS at 18:00

## 2024-03-15 ASSESSMENT — PAIN DESCRIPTION - PAIN TYPE
TYPE: ACUTE PAIN

## 2024-03-15 ASSESSMENT — ENCOUNTER SYMPTOMS
HEADACHES: 0
ABDOMINAL PAIN: 0
SHORTNESS OF BREATH: 0
VOMITING: 0
PALPITATIONS: 0
WEAKNESS: 0
CHILLS: 0
COUGH: 0
FEVER: 0
SPUTUM PRODUCTION: 0
NAUSEA: 0
MYALGIAS: 1

## 2024-03-15 NOTE — PROGRESS NOTES
[facility] Medicine Daily Progress Note    Date of Service  3/15/2024    Chief Complaint  Jama Altman is a 68 y.o. male admitted 3/6/2024 with weakness.    Hospital Course  Jama Altman is a 68 y.o. male w/ a hx of afib recent watchman procedure,  CAD, renal transplant 2010 (hx of polycystic kidney disease), HLD, HTN and PAD.  He was admitted 3/6/2024 with weakness.  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  He had IR place a drain in left axilla 3/7.  A CT scan showed a large left axillary mass with necrosis.  He was hypotensive and received boluses and admitted with septic shock.     S/p IR drain placement 3/7. Repeat shoulder CT 3/11: large, complex left axillary fluid collection. Slightly decreased in size from prior imaging.     Discussed with IR. Given 110mL output overnight; no drain adjustment at this time.     Interval Problem Update  3/12: Per nursing report 110mL out through drain overnight. Patient is up, walking through the halls. No worsening pain. No acute overnight events. Discussed with IR and ID at bedside.     3/13: No acute overnight events.  Drain with 55 mL serosanguineous overnight. No acute overnight events. Afebrile, vitals stable. WBC 8.8, from 8.4. Hb 12.7 from 12.7. Potassium 3.9. Creatinine 2.45 from 2.93.    3/14: No acute overnight events. Drain with 40ml out. Afebrile. Vitals normal and stable. WBC stable at 7.9. Hb 13. Creatinine 1.91 from 2.45. NPO for drain adjustment and up sizing today. Consider discharge home tomorrow with thorough education and close outpatient follow up.     3/15: No acute overnight events. Afebrile, vitals stable and unremarkable. WBC 6.6, from 7.9. Hb 13.3, stable. Creatinine back to baseline - 2.00. NICKI drain upscaled and replaced 3/15. Plan for discharge home with drain in place. Home health to be arranged. Coordinating with outpatient ID for continued follow up.     I have discussed this  patient's plan of care and discharge plan at IDT rounds today with Case Management, Nursing, Nursing leadership, and other members of the IDT team.    Consultants/Specialty  infectious disease, oncology, and IR    Code Status  Full Code    Disposition  Medically Cleared  I have placed the appropriate orders for post-discharge needs.    Review of Systems  Review of Systems   Constitutional:  Negative for chills and fever.   Respiratory:  Negative for cough and shortness of breath.    Cardiovascular:  Negative for chest pain.   Gastrointestinal:  Negative for nausea and vomiting.   Musculoskeletal:  Positive for joint pain.        Physical Exam  Temp:  [36.1 °C (97 °F)-36.8 °C (98.2 °F)] 36.4 °C (97.5 °F)  Pulse:  [] 100  Resp:  [10-26] 18  BP: (110-166)/() 113/78  SpO2:  [90 %-98 %] 92 %    Physical Exam  Vitals and nursing note reviewed.   Constitutional:       General: He is not in acute distress.     Appearance: Normal appearance. He is not ill-appearing.   HENT:      Head: Normocephalic.   Cardiovascular:      Rate and Rhythm: Normal rate and regular rhythm.      Heart sounds: Murmur heard.   Pulmonary:      Effort: Pulmonary effort is normal. No respiratory distress.      Breath sounds: Normal breath sounds. No wheezing.   Abdominal:      Tenderness: There is no abdominal tenderness.   Musculoskeletal:         General: Swelling and tenderness present. Normal range of motion.      Cervical back: Normal range of motion. No tenderness.      Comments: NICKI drain to left axilla    Skin:     General: Skin is warm and dry.   Neurological:      Mental Status: He is alert and oriented to person, place, and time.   Psychiatric:         Mood and Affect: Mood normal.         Behavior: Behavior normal.         Fluids    Intake/Output Summary (Last 24 hours) at 3/15/2024 1303  Last data filed at 3/15/2024 1200  Gross per 24 hour   Intake --   Output 2980 ml   Net -2980 ml       Laboratory  Recent Labs      03/13/24  0018 03/14/24  0338 03/15/24  0143   WBC 8.8 7.9 6.6   RBC 4.37* 4.62* 4.58*   HEMOGLOBIN 12.7* 13.2* 13.3*   HEMATOCRIT 37.6* 40.6* 39.7*   MCV 86.0 87.9 86.7   MCH 29.1 28.6 29.0   MCHC 33.8 32.5 33.5   RDW 44.3 44.9 43.8   PLATELETCT 130* 143* 133*   MPV 10.9 10.9 11.1     Recent Labs     03/13/24  0018 03/14/24  0338 03/15/24  0143   SODIUM 138 141 142   POTASSIUM 3.9 3.8 3.5*   CHLORIDE 102 102 99   CO2 22 28 29   GLUCOSE 200* 155* 173*   BUN 70* 58* 55*   CREATININE 2.45* 1.91* 2.00*   CALCIUM 8.2* 8.6 8.3*                   Imaging  CT-DRAINAGE-CATH EXCHANGE   Final Result         1.  Successful removal and replacement of locking loop left axillary catheter with a new 14 [place] locking loop catheter which was placed dependently in the left axillary abscess.      CT-SHOULDER W/O LEFT   Final Result      1.  Large complex LEFT axillary fluid collection consistent with abscess, with drain in place, slightly decreased in size from prior.   2.  Overlying skin thickening again seen.      US-EXTREMITY VENOUS UPPER UNILAT LEFT   Final Result      CT-DRAIN-SKIN - SUBCUTANEOUS   Final Result      1. Ultrasound GUIDED PLACEMENT OF A PERCUTANEOUS DRAINAGE CATHETER IN A LEFT AXILLARY FLUID COLLECTION.      2.  THE CURRENT PLAN IS TO MONITOR DRAINAGE OUTPUT AND OBTAIN A FOLLOWUP CT SCAN IN 5-7 DAYS IF CLINICALLY INDICATED.      US-RENAL   Final Result      1.  Normal appearance of the right lower quadrant transplant kidney.   2.  Native left kidney demonstrates no hydronephrosis. There are innumerable cysts with very little normal-appearing parenchyma.   3.  Rounded echogenic masslike area in the inferior bladder. It is uncertain if this is related to an enlarged prostate gland protruding into the bladder base or a bladder mass. Recommend clinical correlation with history of hematuria.      CT-CHEST (THORAX) W/O   Final Result      1.  Partially visualized large left axillary mass and necrotic adenopathy is  consistent with known squamous cell carcinoma. Central hypodensity in the larger mass may be related to necrosis.      2.  0.9 cm pleural-based nodule in the right lower lobe is nonspecific, possibly present on the prior exam where there were patchy opacities. Follow-up per oncology protocol      3.  Patchy groundglass density and subsegmental atelectasis appears slightly improved from the prior CT. Findings are consistent with a chronic inflammatory/infectious process.      Fleischner Society pulmonary nodule recommendations:   Not Applicable              Assessment/Plan  * Abscess of axilla, left  Assessment & Plan  Secondary to necrotic squamous cell cancer mass  S/p IR drain placement 3/7; replaced and upsized on 3/14  Wound culture positive for  Finegoldia magna, MRSA, Group D Enterococcus.     3/15:  -Discussed with IR - agreeable with discharge home and close outpatient follow up for drain management  -Discussed with ID - agreeable with discharge home and PO anbx. No stop date at this time  -Waiting to hear back from surgery team for recs    -Home health referrals placed   -Plan for DC home tomorrow AM  - Drain with 30mL out overnight; serosanguinous     Squamous cell carcinoma of skin  Assessment & Plan  Of left axilla per biopsy on 2/23/24    -Per oncology -  plan outpatient PET scan, immunotherapy and radiation therapy    Immunosuppressed status (HCC)- (present on admission)  Assessment & Plan  Per [facility] transplant team Dr. Carly Becerril ([phone]) okay to continue tacrolimus for now but hold mycophenolate  - Nephrology following    Acute kidney injury superimposed on chronic kidney disease (HCC)- (present on admission)  Assessment & Plan  Improving   Creatinine 1.91 (baseline Cr ~2.0)  U/S renal did not show any hydronephrosis nor any signs of active rejection    - Hold cellcept indefinetly due to active cancer  - Continue tacrolimus  - Continue stress steroids; discuss duration with team  tomorrow  - Increase IV lasix to 80mg as blood pressure allows  - Daily BMP to monitor for electrolyte abnormalities in the setting of forced diuresis   - Resume home prednisone 5mg after stress steroids  - Nephrology following    Type 2 diabetes mellitus (HCC)- (present on admission)  Assessment & Plan  A1C 2/16/2024 6.9  - Elevated glucose secondary to stress dosing of steroids  - Glucose ranging 200-350s  - Tapering steroid down, continue lantus once daily with SSI   -  Plan to restart home medications at discharge   - Monitor BG trend.   - Accu-Cheks before meals and at bedtime.   - Goal to keep BG between 140-180 per 2019 ADA guidelines      Thrombocytopenia (HCC)- (present on admission)  Assessment & Plan  Possibly secondary to sepsis  Improving  Daily CBC    History of renal transplant- (present on admission)  Assessment & Plan  2/2 polycystic kidney disease s/p right renal transplant in 2010  - Nephrology following  - Resume Tacrolimus  - Hold Cellcept due to active cancer  - Finish stress dosing of steroids; resume post transplant home prednisone 5mg daily after    Hyponatremia  Assessment & Plan  Resolved     Adrenal insufficiency (HCC)  Assessment & Plan  Due to chronic steroid use  On hydrocotisone.    ACP (advance care planning)  Assessment & Plan   The patient is willing to  have all life sustaining efforts. Continue full code    Peripheral neuropathy  Assessment & Plan  Continue gabapentin 400mg BID    Septic shock (HCC)- (present on admission)  Assessment & Plan  Resolved     Presence of Watchman left atrial appendage closure device- (present on admission)  Assessment & Plan  Afib, s/p watchman procedure on 1/9/24 off anticoagulation  Rate controlled    Abdominal aortic aneurysm (AAA) without rupture (HCC)- (present on admission)  Assessment & Plan  History of, 3.2 cm on 1/27/23  Monitor blood pressure  Continue to monitor    Acute hypoxic respiratory failure (HCC)- (present on admission)  Assessment  & Plan  Resolved    AF (atrial fibrillation) (HCC)- (present on admission)  Assessment & Plan  History of, in SR, s/p watchman procedure on 1/2024, off anticoagulation  Metoprolol on hold due to low bp and adrenal insufficiency   Monitor HR    GERD (gastroesophageal reflux disease)- (present on admission)  Assessment & Plan  Continue Omeprazole 20 mg daily    Coronary artery disease- (present on admission)  Assessment & Plan  S/p MI with 2 stents in 2011  Continue to hold aspirin 81 mg daily for ongoing NICKI drain and abscess management  Continue Atorvastatin 80 mg daily  Hold Metoprolol due to hypotension    Hyperlipidemia- (present on admission)  Assessment & Plan  Atorvastatin 80 mg for ongoing active treatment    Primary hypertension- (present on admission)  Assessment & Plan  Hypotension during admission  Continue to hold home metoprolol         VTE prophylaxis:   SCDs/TEDs      I have performed a physical exam and reviewed and updated ROS and Plan today (3/15/2024). In review of yesterday's note (3/14/2024), there are no changes except as documented above.

## 2024-03-15 NOTE — DISCHARGE PLANNING
TCN following. HTH/SCP chart reviewed. Collaborated with CM. No new TCN needs identified. Please see prior TCN note from 3/13/2024 noting current discharge considerations are  Home with close outpatient f/u's when medically cleared.  Per chart review, patient is followed by surgery and ID at this time. TCN will continue to follow and collaborate with discharge planning team as additional post acute needs arise. Thank you.      Completed:  Choice obtained: None.  See above.   SCP with [facility] P[facility].  Refered to schedulers for Follow up appointment.  F/u scheduled for 3-20-24 at 4:20 PM.     ADDENDUM- 12:20PM- CM alerted TCN of patient need for HH services at discharge to address nursing needs. Appreciate CM obtained of choice for [facility]. TCN remains available to provide collaboration and support in discharge planning as needed.

## 2024-03-15 NOTE — DISCHARGE PLANNING
Received choice form @: 1218  Agency/Facility name: [facility]  Sent referral per choice form @: No referral sent. Pending orders.

## 2024-03-15 NOTE — PROGRESS NOTES
Infectious Disease Progress Note    Author: Tania Briggs M.D. Date & Time of service: 3/15/2024  11:56 AM    Chief Complaint:  renal transplant  Skin cancer  Wound infection    Interval History:   68 y.o. male with PCKD and DM s/p renal transplant  admitted 3/6/2024 for nonhealing wound left axilla.  3/11 AF WBC 10 possible removal drain today-creat 3  3/12 AF WBC 8.4 minimal output from drain but per repeat CT large complex residual abscess. Platelets up to 127  3/14 AF WBC 7.9 plan for upsize drain today noted-renal function improved. Tolerating abx well so far  3/15 AF WBC 6.6 creat 2-drain upsized with improved output-current plan is to discharge home with drain platelets  130  Labs Reviewed, Medications Reviewed, and Wound Reviewed.    Review of Systems:  Review of Systems   Constitutional:  Negative for fever.   Respiratory:  Negative for cough and shortness of breath.    Gastrointestinal:  Negative for nausea and vomiting.   Musculoskeletal:  Positive for myalgias.   All other systems reviewed and are negative.      Hemodynamics:  Temp (24hrs), Av.3 °C (97.4 °F), Min:36.1 °C (97 °F), Max:36.8 °C (98.2 °F)  Temperature: 36.4 °C (97.5 °F)  Pulse  Av.7  Min: 54  Max: 126   Blood Pressure : 113/78       Physical Exam:  Physical Exam  Vitals and nursing note reviewed.   Constitutional:       General: He is not in acute distress.     Appearance: He is not ill-appearing, toxic-appearing or diaphoretic.   Eyes:      General: No scleral icterus.     Extraocular Movements: Extraocular movements intact.      Pupils: Pupils are equal, round, and reactive to light.   Cardiovascular:      Rate and Rhythm: Normal rate.   Pulmonary:      Effort: Pulmonary effort is normal. No respiratory distress.      Breath sounds: No stridor.   Abdominal:      General: There is no distension.   Musculoskeletal:      Comments: Nearly resolved swelling and erythema LUE/chest  Drain thick serosanguinous   Skin:      Coloration: Skin is not jaundiced.      Findings: Bruising present.   Neurological:      General: No focal deficit present.      Mental Status: He is alert and oriented to person, place, and time.   Psychiatric:         Mood and Affect: Mood normal.         Behavior: Behavior normal.         Meds:    Current Facility-Administered Medications:     [START ON 3/16/2024] tacrolimus    tacrolimus    ampicillin-sulbactam (UNASYN) IV    linezolid    famotidine    oxyCODONE immediate-release    oxyCODONE immediate release    insulin GLARGINE **AND** insulin lispro **AND** POC blood glucose manual result **AND** NOTIFY MD and PharmD **AND** Administer 20 grams of glucose (approximately 8 ounces of fruit juice) every 15 minutes PRN FSBG less than 70 mg/dL **AND** dextrose bolus    [START ON 3/16/2024] predniSONE    sodium bicarbonate    gabapentin    [Held by provider] heparin    normal saline flush    atorvastatin    [Held by provider] metoprolol SR    [Held by provider] omeprazole    acetaminophen    senna-docusate **AND** polyethylene glycol/lytes    ondansetron    ondansetron    Labs:  Recent Labs     03/13/24 0018 03/14/24 0338 03/15/24  0143   WBC 8.8 7.9 6.6   RBC 4.37* 4.62* 4.58*   HEMOGLOBIN 12.7* 13.2* 13.3*   HEMATOCRIT 37.6* 40.6* 39.7*   MCV 86.0 87.9 86.7   MCH 29.1 28.6 29.0   RDW 44.3 44.9 43.8   PLATELETCT 130* 143* 133*   MPV 10.9 10.9 11.1     Recent Labs     03/13/24 0018 03/14/24 0338 03/15/24  0143   SODIUM 138 141 142   POTASSIUM 3.9 3.8 3.5*   CHLORIDE 102 102 99   CO2 22 28 29   GLUCOSE 200* 155* 173*   BUN 70* 58* 55*     Recent Labs     03/13/24 0018 03/14/24  0338 03/15/24  0143   CREATININE 2.45* 1.91* 2.00*       Imaging:  US-EXTREMITY VENOUS UPPER UNILAT LEFT    Result Date: 3/8/2024   Upper Extremity  Venous Duplex Report  Vascular Laboratory  CONCLUSIONS  1.  No left upper extremity DVT or SVT.  2.  There is subcutaneous edema.  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  Limitations:  PROCEDURES:  Left upper extremity venous duplex imaging.  The following venous structures were evaluated: internal jugular,  subclavian, axillary, brachial, cephalic, and basilic veins.  Serial compression, color, and spectral Doppler flow evaluations were  performed.  FINDINGS:  Left upper extremity.  All veins demonstrate complete color filling and compressibility with  normal venous flow dynamics including spontaneous flow and respiratory  phasicity.  No superficial or deep venous thrombosis.  Flow was evaluated in the contralateral subclavian vein and normal venous  flow dynamics including spontaneous flow and respiratory phasic variation  were demonstrated.  Johnna Fitch  (Electronically Signed)  Final Date:      2024                   10:30    CT-DRAIN-SKIN - SUBCUTANEOUS    Result Date: 3/8/2024  3/7/2024 11:56 AM HISTORY/REASON FOR EXAM: Left axillary fluid collection. TECHNIQUE/EXAM DESCRIPTION: Left axillary abscess drainage with ultrasound guidance. PROCEDURE: Informed consent was obtained. Localizing ultrasound images were obtained with the patient in supine position. The skin was prepped with ChloraPrep and draped in a sterile fashion. Sedation was not administered. Intraservice time was 15 minutes. Following local anesthesia with 1% Lidocaine, a 17 G  guiding needle was placed and needle path confirmed with CT. An Amplatz guidewire was placed and following serial tract dilatation, a 12 Cymraes pigtail locking catheter was placed. A specimen was collected and submitted for culture and sensitivity and Gram stain. Total of 420 mL bloody malodorous fluid was drained. The catheter was secured to the skin and connected to suction bulb drainage. Final ultrasound images were obtained documenting catheter position. The patient tolerated the procedure well with no evidence of complication. COMPARISON: None available. FINDINGS: The final ultrasound images show satisfactory catheter position within the target collection.     1. Ultrasound GUIDED PLACEMENT OF A PERCUTANEOUS DRAINAGE CATHETER IN A LEFT AXILLARY FLUID COLLECTION. 2.  THE CURRENT PLAN IS TO MONITOR DRAINAGE OUTPUT AND OBTAIN A FOLLOWUP CT SCAN IN 5-7 DAYS IF CLINICALLY INDICATED.    US-RENAL    Result Date: 3/7/2024  3/7/2024 9:21 AM HISTORY/REASON FOR EXAM:  Abnormal Labs TECHNIQUE/EXAM DESCRIPTION: Renal ultrasound. COMPARISON:  None FINDINGS: The right lower quadrant transplant kidney measures 11.96 cm.  The right kidney appears normal in contour and parenchymal echotexture. The corticomedullary differentiation is preserved. The right renal collecting system is not dilated. No hydronephrosis.  There are no renal calculi. The left native kidney measures 14.77 cm. There are innumerable cysts. There is very little normal-appearing parenchyma in the left kidney. The left renal collecting system is not dilated. No hydronephrosis. There are no renal calculi. The bladder demonstrates a rounded echogenic masslike focus with some minimal peripheral vascularity at the base abutting the wall of the inferior bladder measuring 1.7 x 1.9 x 1.7 cm. Bladder volume is 280 mL. The patient is unable to void during the examination.     1.  Normal appearance of the right lower quadrant transplant kidney. 2.  Native left kidney  demonstrates no hydronephrosis. There are innumerable cysts with very little normal-appearing parenchyma. 3.  Rounded echogenic masslike area in the inferior bladder. It is uncertain if this is related to an enlarged prostate gland protruding into the bladder base or a bladder mass. Recommend clinical correlation with history of hematuria.    CT-CHEST (THORAX) W/O    Result Date: 3/6/2024  3/6/2024 8:42 PM HISTORY/REASON FOR EXAM:  L axilla squamous cell cancer, now with signs of infection. H/O CKD and renal transplant. TECHNIQUE/EXAM DESCRIPTION: CT scan of the chest without contrast. Noncontrast helical scanning of the chest was obtained from the lung apices through the adrenal glands. Low dose optimization technique was utilized for this CT exam including automated exposure control and adjustment of the mA and/or kV according to patient size. COMPARISON: 5/24/2022 FINDINGS: Lungs: Patchy groundglass density and subsegmental atelectasis, slightly improved from the prior CT. A small pleural-based nodule in the right lower lobe measures approximately 0.9 cm. Mediastinum/Isadora: No significant adenopathy. Pleura: No pleural effusion. Cardiac: Mild cardiomegaly. A watchman's device is in the left atrial appendage. There are coronary artery calcifications. Vascular: Nonaneurysmal aorta with scattered arterial calcifications. Soft tissues: A partially visualized large left axillary mass measures at least 6.6 x 11.3 cm. There is an enlarged necrotic left axillary lymph node measuring approximately 2 cm short axis. There is stranding in the adjacent soft tissues. The overlying skin is only partially visualized but appears thickened. Upper abdomen: There are innumerable hypodense and hyperdense cysts in the partially visualized left kidney Bones: No suspicious bony lesions.     1.  Partially visualized large left axillary mass and necrotic adenopathy is consistent with known squamous cell carcinoma. Central hypodensity in  the larger mass may be related to necrosis. 2.  0.9 cm pleural-based nodule in the right lower lobe is nonspecific, possibly present on the prior exam where there were patchy opacities. Follow-up per oncology protocol 3.  Patchy groundglass density and subsegmental atelectasis appears slightly improved from the prior CT. Findings are consistent with a chronic inflammatory/infectious process. Fleischner Society pulmonary nodule recommendations: Not Applicable     EC-RADHA W/O CONT    Result Date: 2024  Transesophageal Echo Report Echocardiography Laboratory CONCLUSIONS The watchman device is well seated within the left atrial appendage without leakage. CIARA FORRESTER Exam Date:          2024                     10:58 Exam Location:      Inpatient Priority:            Routine Ordering Physician:        NAYELI CHAPMAN                             ([id number]) Referring Physician:       ADELA Killian Sonographer:               Gloria Hill [facility] Age:    68     Gender:    M MRN:    [id number] :    1955 BSA:           Ht (in):           Wt (lb): Report Type:      Complete Indications:     Arrhythmia ICD Codes:       427.9 CPT Codes:       10333 BP:          /          HR: Technical Quality:       Fair MEASUREMENTS  (Male / Female) Normal Values * Indicates values subject to auto-interpretation LV EF:        % Medications Limitations Complications Proc. Components Epiq probe #  [id number] was used for this procedure. The probe was inserted and manipulated by Panda MONIQUE. FINDINGS Left Ventricle Right Ventricle Right Atrium Left Atrium LA Appendage The watchman device is well seated within the left atrial appendage without leakage. IA Septum IV Septum Mitral Valve Aortic Valve Tricuspid Valve Pulmonic Valve Pericardium Aorta Jaspreet GARCES To (Electronically Signed) Final Date:     2024                 11:47    IR-NEEDLE BX-LYMPH NODE    Result Date: 2024 8:46 AM HISTORY/REASON FOR EXAM:  Left axillary  mass Procedure: After the informed consent the patient was placed on the ultrasound table and supine position. Ultrasound examination of the left axilla demonstrates a large necrotic lesion. Subsequently, after injecting lidocaine, a 19-gauge needle was advanced into the fluid collection. An approximately 250 mL of fluid was collected and sent it to the cytology and microbiology. Subsequently 2 core biopsies were obtained using 18-gauge biopsy needle. The patient tolerated procedure without any immediate complication.     Ultrasound-guided aspiration and biopsy of left axillary necrotic lesion.    CT-SHOULDER WITH PLUS RECONS LEFT    Result Date: 2/20/2024 2/19/2024 4:29 PM HISTORY/REASON FOR EXAM:  Squamous cell carcinoma of the skin of left upper limb, including shoulder. TECHNIQUE/ EXAM DESCRIPTION AND NUMBER OF VIEWS:  CT scan of the LEFT shoulder with contrast with reconstructions. Axial spiral CT images were obtained of the upper extremity from the shoulder through the proximal third of the humerus. Images were reviewed at soft tissue and bone windows with administration of 165 mL of Omnipaque 350 nonionic contrast without complication. Sagittal reformations were then generated. Up to date radiation dose reduction adjustments have been utilized to meet ALARA standards for radiation dose reduction. COMPARISON: CTA chest 5/24/2022 FINDINGS: There is a 10 x 10 cm peripherally enhancing mass in the left axilla on image 79 series 2. This abuts the chest wall and dermis. There are a few adjacent small similar-appearing masses in the high left axilla on images 48, 56, and 62. Largest of these additional similar-appearing masses measures 2.3 cm. There is a new 6 mm left upper lobe nodule image 217 series 2. Mild scarring in the left lung. Mild DJD of the left glenohumeral joint and AC joint. No focal osseous lesions seen.     1. 10 x 10 cm left axillary mass, which could represent metastatic adenopathy or primary  mass. 2. There are a few adjacent similar-appearing left axillary masses which are consistent with adenopathy, largest 2.3 cm. 3. 6 mm indeterminate left upper lobe pulmonary nodule. Recommend formal chest CT follow-up. 4. Mild DJD of the AC joint and glenohumeral joint.      Micro:  Results       Procedure Component Value Units Date/Time    GRAM STAIN [[id number]] Collected: 03/14/24 1540    Order Status: Completed Specimen: Wound Updated: 03/14/24 2235     Significant Indicator .     Source WND     Site L axillary aspirate     Gram Stain Result Many WBCs.  Rare Gram positive cocci.      CULTURE WOUND W/ GRAM STAIN [[id number]] Collected: 03/14/24 1540    Order Status: No result Specimen: Wound Updated: 03/14/24 2235     Significant Indicator NEG     Source WND     Site L axillary aspirate     Culture Result -     Gram Stain Result Many WBCs.  Rare Gram positive cocci.      FLUID CULTURE W/GRAM STAIN [[id number]]     Order Status: No result Specimen: Other Body Fluid     FLUID CULTURE W/GRAM STAIN [[id number]] Collected: 03/14/24 1540    Order Status: Canceled Specimen: Other     Blood Culture - Draw one from central line and one from peripheral site [[id number]] Collected: 03/06/24 2227    Order Status: Completed Specimen: Blood from Line Updated: 03/12/24 0100     Significant Indicator NEG     Source BLD     Site Peripheral     Culture Result No growth after 5 days of incubation.    Narrative:      Blood Cultures X2. Blood Cultures X2. Draw one blood culture  from central line and one blood culture peripherally. If no  line present, draw blood cultures times two peripherally from  different sites.  No site indicated    Blood Culture - Draw one from central line and one from peripheral site [[id number]] Collected: 03/06/24 2025    Order Status: Completed Specimen: Blood from Peripheral Updated: 03/11/24 2300     Significant Indicator NEG     Source BLD     Site PERIPHERAL     Culture Result No growth after 5 days of  incubation.    Narrative:      Blood Cultures X2. Draw one blood culture from central line  and one blood culture peripherally. If no line present, draw  blood cultures times two peripherally from different sites.  No site indicated    URINALYSIS [[id number]]  (Abnormal) Collected: 03/11/24 1843    Order Status: Completed Specimen: Urine, Clean Catch Updated: 03/11/24 1904     Color Yellow     Character Clear     Specific Gravity 1.008     Ph 5.0     Glucose 100 mg/dL      Ketones Negative mg/dL      Protein Negative mg/dL      Bilirubin Negative     Urobilinogen, Urine 0.2     Nitrite Negative     Leukocyte Esterase Negative     Occult Blood Negative     Micro Urine Req see below     Comment: Microscopic examination not performed when specimen is clear  and chemically negative for protein, blood, leukocyte esterase  and nitrite.         Narrative:      Contact  Collected By: [id number] MIROSLAVA CHRISTIANSON    URINALYSIS [[id number]]     Order Status: Canceled Specimen: Urine     Anaerobic Culture [[id number]]  (Abnormal) Collected: 03/07/24 1206    Order Status: Completed Specimen: Wound Updated: 03/10/24 1358     Significant Indicator POS     Source WND     Site L Axillary Fluid     Culture Result -      Finegoldia magna  Heavy growth      Narrative:      Left axillary fluid aspirate  Left axillary fluid aspirate    CULTURE WOUND W/ GRAM STAIN [[id number]]  (Abnormal)  (Susceptibility) Collected: 03/07/24 1206    Order Status: Completed Specimen: Wound Updated: 03/10/24 1358     Significant Indicator POS     Source WND     Site L Axillary Fluid     Culture Result -     Gram Stain Result Rare WBCs.  Moderate Gram positive cocci.       Culture Result Methicillin Resistant Staphylococcus aureus  Heavy growth  This isolate is presumed to be clindamycin resistant based on  detection of inducible resistance.        Enterococcus faecalis  Heavy growth      Narrative:      Left axillary fluid aspirate  Left axillary fluid aspirate     Susceptibility       Methicillin resistant staphylococcus aureus (1)       Antibiotic Interpretation Microscan   Method Status    Clindamycin Resistant 0.5 mcg/mL CESIA Final    Cefazolin Resistant <=8 mcg/mL CESIA Final    Cefepime Resistant 8 mcg/mL CESIA Final    Daptomycin Sensitive <=0.5 mcg/mL CESIA Final    Erythromycin Resistant >4 mcg/mL CESIA Final    Ampicillin/sulbactam Resistant <=8/4 mcg/mL CESIA Final    Linezolid Sensitive 2 mcg/mL CESIA Final    Oxacillin Resistant >2 mcg/mL CESIA Final    Trimeth/Sulfa Sensitive <=0.5/9.5 mcg/mL CESIA Final    Tetracycline Resistant >8 mcg/mL CESIA Final    Vancomycin Sensitive 1 mcg/mL CESIA Final              Enterococcus faecalis (2)       Antibiotic Interpretation Microscan   Method Status    Ampicillin Sensitive <=2 mcg/mL CESIA Final    Daptomycin Sensitive 2 mcg/mL CESIA Final    Linezolid Sensitive 2 mcg/mL CESIA Final    Tetracycline Resistant >8 mcg/mL CESIA Final    Vancomycin Sensitive 1 mcg/mL CESIA Final    Gent Synergy Sensitive <=500 mcg/mL CESIA Final    Penicillin Sensitive 2 mcg/mL CESIA Final                       Urine Culture (New) [[id number]] Collected: 03/07/24 1733    Order Status: Completed Specimen: Urine Updated: 03/09/24 0833     Significant Indicator NEG     Source UR     Site -     Culture Result No growth at 48 hours.    Narrative:      Indication for culture:->Emergency Room Patient  Indication for culture:->Emergency Room Patient            Assessment:  Active Hospital Problems    Diagnosis     *Abscess of axilla, left [L02.412]     ACP (advance care planning) [Z71.89]     Adrenal insufficiency (HCC) [E27.40]     Axillary mass, left [R22.32]     Septic shock (HCC) [A41.9, R65.21]     Peripheral neuropathy [G62.9]     Squamous cell carcinoma of skin [C44.92]     Severe sepsis (HCC) [A41.9, R65.20]     Presence of Watchman left atrial appendage closure device [Z95.818]     Abdominal aortic aneurysm (AAA) without rupture (HCC) [I71.40]     Immunosuppressed status (HCC)  [D84.9]     Acute hypoxic respiratory failure (HCC) [J96.01]     AF (atrial fibrillation) (HCC) [I48.91]     Type 2 diabetes mellitus (HCC) [E11.9]     Thrombocytopenia (HCC) [D69.6]     GERD (gastroesophageal reflux disease) [K21.9]     Primary hypertension [I10]     Acute kidney injury superimposed on chronic kidney disease (HCC) [N17.9, N18.9]     Coronary artery disease [I25.10]     Hyperlipidemia [E78.5]     History of renal transplant [Z94.0]      SCCA of the left chest/axilla  Wound infection with abscess after biopsy  -polymicrobial MRSA, Efaecalis, Fingoldia  -s/p drain 3/7  -CT 3/11 : Irregular fluid collection in the LEFT axillary subcutaneous soft tissues measuring 8.3 x 5.1 x 11.1 cm with gas bubbles and drainage catheter in place.   SANKET  S/p renal transplant-Nephrology eval appreciated  Thrombocytopenia      PLAN:   Continue Zyvox for MRSA-deferring Bactrim due to CKD  DC Unasyn and start Augmentin for treatment Efaecalis and Fingoldia  Adjust abx for renal insuff/weight  Monitor CBC on Zyvox-has thrombocytopenia but improved since admit  Drain per IR  Plan to re-eval for possibility of surgical drainage if cannot be managed more conservatively by IR-has known poor wound healing  Anticipate 10-14 days course antibiotics from date of adequate drainage/source control  Plan to adjust immunosuppressives if needed due to need for XRT/treatment SCCA     Midline: No- oral options are available

## 2024-03-15 NOTE — DISCHARGE PLANNING
ATTN: Case Management  RE: Referral for Home Health    As of 3/15/2024, we have accepted the Home Health referral for the patient listed above.    A [facility] clinician will be out to see the patient within 48 hours. If you have any questions or concerns regarding the patient’s transition to Home Health, please do not hesitate to contact us at [phone].      We look forward to collaborating with you,  [facility] Team

## 2024-03-15 NOTE — CARE PLAN
The patient is Stable - Low risk of patient condition declining or worsening    Shift Goals  Clinical Goals: monitor NICKI drain, iv abx  Patient Goals: pain control, comfort  Family Goals: N/A    Progress made toward(s) clinical / shift goals: Plan of care and medications discussed and reviewed over with pt. Medicated per MAR for pt's pain. Pt requests tylenol for pain relief. Pt hemodynamically stable. Pt following fluids restrict diet. Pt able to void and up self appropriately to the bathroom    Patient is not progressing towards the following goals:      Problem: Knowledge Deficit - Standard  Goal: Patient and family/care givers will demonstrate understanding of plan of care, disease process/condition, diagnostic tests and medications  Outcome: Progressing     Problem: Pain - Standard  Goal: Alleviation of pain or a reduction in pain to the patient’s comfort goal  Outcome: Progressing     Problem: Hemodynamics  Goal: Patient's hemodynamics, fluid balance and neurologic status will be stable or improve  Outcome: Progressing     Problem: Fluid Volume  Goal: Fluid volume balance will be maintained  Outcome: Progressing     Problem: Urinary - Renal Perfusion  Goal: Ability to achieve and maintain adequate renal perfusion and functioning will improve  Outcome: Progressing

## 2024-03-15 NOTE — DISCHARGE PLANNING
Received Choice form at 1210  Agency/Facility Name: [facility]  Referral sent per Choice form @ [phone]

## 2024-03-15 NOTE — PROGRESS NOTES
[facility] -  PROGRESS NOTE               Author: Johnathan Melendez D.O. 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 Initially started on broad spectrum abx and volume expansion but developed shock promoting transfer to the ICU with initiation of pressors. Underwent IR drain placement on necrotic lesions on 3/7. Stress dose steroids. MMF held, tacrolimus transiently held. Shock resolved, transferred out of ICU. Hem/onc consulted, without plans for inpatient therapy.  Baseline cr appears ~1.8, on FK 1mg BID, MMF, pred at home, notes stable levels for many years. Cr elevated to 2.6 on admission and has remained stable since. UA on 3/7 without blood or protein. Urine output not well documented.  Currently without chest pain, sob. Some mild nausea. No ongoing f/c/s.        DAILY NEPHROLOGY SUMMARY:  03/09 - consult done  03/10 - SCr trending up, CO2 and Na trending down, SBP 100s-110s, UOP not fully recorded, c/o edema, no other new c/o  3/11: Cr continues to rise. Pt feels okay, peeing well. Edema still present  3/12: cr improving, repeat UA bland. Spoke with [facility] [place] transplant last afternoon.   3/13: Cr improving, pt feels well.   3/14: Cr now at baseline. Pt without any major complaints.   3/15: Cr remains stable, robust UOP with Lasix. Swelling resolved. Tac level back,and is slightly high at 5.7 (goal 3-5)    REVIEW OF SYSTEMS:    10 point ROS reviewed and is as per HPI/daily summary or otherwise negative    PMH/PSH/SH/FH: Reviewed and unchanged since admission note  CURRENT MEDICATIONS: Reviewed from admission to present day    VS:  /78   Pulse 100   Temp 36.4 °C (97.5 °F) (Temporal)   Resp  "18   Ht 1.956 m (6' 5.01\")   Wt 115 kg (253 lb 8.5 oz)   SpO2 92%   BMI 30.06 kg/m²   Physical Exam  Vitals and nursing note reviewed.   Constitutional:       General: He is not in acute distress.  HENT:      Head: Normocephalic and atraumatic.      Right Ear: External ear normal.      Left Ear: External ear normal.      Nose: Nose normal.      Mouth/Throat:      Mouth: Mucous membranes are moist.      Pharynx: Oropharynx is clear.   Eyes:      General: No scleral icterus.     Extraocular Movements: Extraocular movements intact.   Cardiovascular:      Rate and Rhythm: Normal rate and regular rhythm.   Pulmonary:      Effort: Pulmonary effort is normal.      Breath sounds: No wheezing.   Abdominal:      General: There is no distension.      Palpations: Abdomen is soft.   Musculoskeletal:      Right lower leg: Edema present.      Left lower leg: Edema present.   Skin:     General: Skin is warm and dry.      Coloration: Skin is not jaundiced.   Neurological:      General: No focal deficit present.      Mental Status: He is alert and oriented to person, place, and time.      Cranial Nerves: No cranial nerve deficit.   Psychiatric:         Mood and Affect: Mood normal.         Behavior: Behavior normal.         Fluids:  In: -   Out: 3055     LABS:  Recent Labs     03/13/24  0018 03/14/24  0338 03/15/24  0143   SODIUM 138 141 142   POTASSIUM 3.9 3.8 3.5*   CHLORIDE 102 102 99   CO2 22 28 29   GLUCOSE 200* 155* 173*   BUN 70* 58* 55*   CREATININE 2.45* 1.91* 2.00*   CALCIUM 8.2* 8.6 8.3*              ASSESSMENT:  # SANKET: In setting of septic shock, resolving  -UA bland on 3/11  # Renal Transplant  -Baseline cr appears ~1.8  [place] [facility] [place]Previous regimen: FK 1mg BID, MMF 500gm BID, pred 5mg   -discussed active [place] with [facility] [place], who agreed with holding MMF due to Ca, and targeting tacrolimus level 3-5, and cont prednisone  -[facility] [place] avoiding PD-1/check point immunotherapy as high risk of rejection  # " Immunosuppressed state  # Septic Shock:   -necrotic skin infection/abscess  -broad spectrum abx, s/p IR drain placement   # Hyponatremia:  # Acidosis   # SCC of the axilla:   -non-resectable.  -Per hem/onc liekly needs immunotherapy/rad therapy.   -would avoid checkpoint (PD-1) immunotherapy as it has a higher risk of causing rejection  -recommend oncology discuss with his transplant center  # Anemia  # PAF  # DM  # CAD  # Thrombocytopenia: chornic     SUGGESTIONS:    -Cr stable, swelling resolved  -stopped lasix, no further need for maintenance dose  -reduce tac to 1mg qAM, 0.5mg qPM (level 5.7, goal 3-5 due to cancer)  -hold cellcept indefinitely due to active cancer per discussion with [facility] transplant on 3/11 ([phone]),  -finish stress dose steroids, can restart prednisone 5mg daily for transplant once completed (3/15)  -Strict I/Os  -Dose all meds per eGFR   -reduce sodium bicarb to 650mg BID, can stop if bicarb persistently > 22    Dispo: will sign off as pt is at baseline. He will need follow-up with his outpt nephrologist 1-2 weeks after discharge    Please page nephrology with any questions or concerns

## 2024-03-15 NOTE — PROGRESS NOTES
Radiology Progress Note   Author: LARRY Lujan   Date & Time created: 3/15/2024  8:25 AM   Date of admission  3/6/2024  Note to reader: this note follows the APSO format rather than the historical SOAP format. Assessment and plan located at the top of the note for ease of use.    Chief Complaint  68 y.o. male admitted 3/6/2024 with   Chief Complaint   Patient presents with    Flu Like Symptoms     C/o flu like symptoms since yesterday. + body aches and chills. + lightheadedness.     Wound Check     Also c/o left wound possible infection. 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  He was admitted 03/06/2024 for CT finding of left axillary fluid collection after presenting to the ED for worsening axillary pain.  Patient underwent a left axillary abscess drain placement with IR Dr. Gutierrez on 03/07/2024.    Interval History:   03/08/24 - 12 Fr left axillary abscess drain to Left axilla with 455 mL turbid serosanguineous output in the last 24 hours. WBC 13.9. Cultures pending. On ABX. Drain flushed with 10 mL NS.     03/09/24 - 12 Fr left axillary abscess drain to Left axilla with 35 mL of serosanguineous output in the last 24 hours.  I flushed drain with 10 mL normal saline solution.  Order placed to have RNs irrigate wound with 10 mL NSS 2 times daily; I I reviewed today's labs: WBC 13.8; Hgb 11.8, Cr 2.55; Micro from axillary fluid positive for MRSA. I  Afebrile.    03/10/24 - 12 Fr left axillary abscess drain to Left axilla with 85 mL of serosanguineous output in the last 24 hours.  I flushed drain with 10 mL normal saline solution.  Order placed to have RNs irrigate wound with 10 mL NSS 2 times daily; I I reviewed today's labs: WBC 12.0;  Hgb 12.0, Cr 2.78; FSBS 208; micro from axillary fluid positive for Finegoldia magna, MRSA, enterococcus Faecalis. Afebrile.    03/11/24 - 12 Fr left axillary abscess drain to Left axilla with 5 mL of serosanguineous output in the last 24 hours. Labs reviewed: WBC 10.0; Hgb 11.9, Cr 3.03. Surgery had clamped drain earlier in the day and asked me to unclamp this afternoon and put drain back to suction. No immediate fluid from drain following placing back to suction. Pt discussed with surgery and hospitalist.     03/12/24 - 12 Fr left axillary abscess drain to Left axilla with 110 mL of serosanguineous output in the last 24 hours. Labs reviewed: WBC 8.4; Hgb 12.7, Cr 2.93. Pt discussed with surgery and hospitalist.     03/13/24 - 12 Fr left axillary abscess drain to Left axilla with 55 mL of serosanguineous output in the last 24 hours. Labs reviewed: WBC 8.8; Hgb 12.7, Cr 2.45. Pt discussed with surgery and hospitalist.     03/14/24 - 12 Fr left axillary abscess drain to Left axilla with 35 mL of serosanguineous output in the last 24 hours. Labs reviewed: WBC 7.9; Hgb 13.2, Cr 1.91. Pt discussed with hospitalist. Drain upsizing pending.     03/15/24 - Left axillary abscess drain upsized to a 14 Fr yesterday with IR Dr Deal with35 mL of serosanguineous output overnight. Labs reviewed: WBC 6.6; Hgb 13.2, Cr 2.0. Pt discussed with hospitalist and ID.     Assessment/Plan     Principal Problem:    Abscess of axilla, left  Active Problems:    History of renal transplant    Primary hypertension    Hyperlipidemia    Coronary artery disease    Acute kidney injury superimposed on chronic kidney disease (HCC)    Thrombocytopenia (HCC)    GERD (gastroesophageal reflux disease)    Type 2 diabetes mellitus (HCC)    AF (atrial fibrillation) (HCC)    Acute hypoxic respiratory failure (HCC)    Immunosuppressed status (HCC)    Abdominal aortic aneurysm (AAA) without rupture (HCC)    Presence of Watchman left atrial appendage closure  device    Septic shock (HCC)    Peripheral neuropathy    Squamous cell carcinoma of skin    Severe sepsis (HCC)    Axillary mass, left    ACP (advance care planning)    Adrenal insufficiency (HCC)    Hyponatremia      Plan IR  - Continue orders for RNs to irrigate Left axillary drain with 10 ml of sterile saline each shift  - Cultures positive for Finegoldia magna, MRSA, enterococcus Faecalis  - Repeat cultures pending, rare GPC  - ABX per ID  - general surgery, nephrology, and ID following   - Ultimate plan is to discharge home with drain in place and ID follow up.   - If conservative IR drain treatment/abx unsuccessful to resolve abscess, recommend surgical follow-up.  - From an IR standpoint, OK to discharge with drain in place with ID follow up   -Home health being arranged for drain care.  -Education regarding drain flushing/management provided to patient and significant other.  Supplies provided for home use.  RN to reinforce education.  - Continue to monitor drains, VS, and labs while in hospital    -Thank you for allowing Interventional Radiology team to participate in the patients care, if any additional care or requests are needed in the future please do not hesitate to call or place IR order   [phone]           Review of Systems  Physical Exam   Review of Systems   Constitutional:  Negative for chills and fever.   Respiratory:  Negative for sputum production and shortness of breath.    Cardiovascular:  Negative for chest pain and palpitations.   Gastrointestinal:  Negative for abdominal pain, nausea and vomiting.   Musculoskeletal:         Left axillary pain (improving)   Neurological:  Negative for weakness and headaches.      Vitals:    03/15/24 0441   BP: 124/86   Pulse: 87   Resp: 15   Temp: 36.1 °C (97 °F)   SpO2: 96%        Physical Exam  Vitals and nursing note reviewed.   Constitutional:       General: He is not in acute distress.     Appearance: Normal appearance.   Cardiovascular:      Rate and  Rhythm: Normal rate.      Pulses: Normal pulses.   Pulmonary:      Effort: Pulmonary effort is normal. No respiratory distress.   Abdominal:      Palpations: Abdomen is soft.   Musculoskeletal:         General: Tenderness (Left axilla improving) present.      Comments: IR drain to left axilla   Skin:     General: Skin is warm and dry.   Neurological:      General: No focal deficit present.      Mental Status: He is alert and oriented to person, place, and time.   Psychiatric:         Attention and Perception: Attention normal.         Mood and Affect: Mood normal.         Behavior: Behavior normal.             Labs    Recent Labs     03/13/24  0018 03/14/24 0338 03/15/24  0143   WBC 8.8 7.9 6.6   RBC 4.37* 4.62* 4.58*   HEMOGLOBIN 12.7* 13.2* 13.3*   HEMATOCRIT 37.6* 40.6* 39.7*   MCV 86.0 87.9 86.7   MCH 29.1 28.6 29.0   MCHC 33.8 32.5 33.5   RDW 44.3 44.9 43.8   PLATELETCT 130* 143* 133*   MPV 10.9 10.9 11.1     Recent Labs     03/13/24  0018 03/14/24 0338 03/15/24  0143   SODIUM 138 141 142   POTASSIUM 3.9 3.8 3.5*   CHLORIDE 102 102 99   CO2 22 28 29   GLUCOSE 200* 155* 173*   BUN 70* 58* 55*   CREATININE 2.45* 1.91* 2.00*   CALCIUM 8.2* 8.6 8.3*     Recent Labs     03/13/24  0018 03/14/24 0338 03/15/24  0143   CREATININE 2.45* 1.91* 2.00*     CT-DRAINAGE-CATH EXCHANGE   Final Result         1.  Successful removal and replacement of locking loop left axillary catheter with a new 14 [place] locking loop catheter which was placed dependently in the left axillary abscess.      CT-SHOULDER W/O LEFT   Final Result      1.  Large complex LEFT axillary fluid collection consistent with abscess, with drain in place, slightly decreased in size from prior.   2.  Overlying skin thickening again seen.      US-EXTREMITY VENOUS UPPER UNILAT LEFT   Final Result      CT-DRAIN-SKIN - SUBCUTANEOUS   Final Result      1. Ultrasound GUIDED PLACEMENT OF A PERCUTANEOUS DRAINAGE CATHETER IN A LEFT AXILLARY FLUID COLLECTION.      2.   THE CURRENT PLAN IS TO MONITOR DRAINAGE OUTPUT AND OBTAIN A FOLLOWUP CT SCAN IN 5-7 DAYS IF CLINICALLY INDICATED.      US-RENAL   Final Result      1.  Normal appearance of the right lower quadrant transplant kidney.   2.  Native left kidney demonstrates no hydronephrosis. There are innumerable cysts with very little normal-appearing parenchyma.   3.  Rounded echogenic masslike area in the inferior bladder. It is uncertain if this is related to an enlarged prostate gland protruding into the bladder base or a bladder mass. Recommend clinical correlation with history of hematuria.      CT-CHEST (THORAX) W/O   Final Result      1.  Partially visualized large left axillary mass and necrotic adenopathy is consistent with known squamous cell carcinoma. Central hypodensity in the larger mass may be related to necrosis.      2.  0.9 cm pleural-based nodule in the right lower lobe is nonspecific, possibly present on the prior exam where there were patchy opacities. Follow-up per oncology protocol      3.  Patchy groundglass density and subsegmental atelectasis appears slightly improved from the prior CT. Findings are consistent with a chronic inflammatory/infectious process.      Fleischner Society pulmonary nodule recommendations:   Not Applicable           INR   Date Value Ref Range Status   03/06/2024 1.34 (H) 0.87 - 1.13 Final     Comment:     INR - Non-therapeutic Reference Range: 0.87-1.13  INR - Therapeutic Reference Range: 2.0-4.0       POC INR   Date Value Ref Range Status   08/03/2022 2.1 (H) 0.9 - 1.2 Final     Comment:     INR - Non-therapeutic Reference Range: 0.9-1.2  INR - Therapeutic Reference Range: 2.0-4.0          Intake/Output Summary (Last 24 hours) at 3/8/2024 0833  Last data filed at 3/8/2024 0655  Gross per 24 hour   Intake 100 ml   Output 1025 ml   Net -925 ml      Labs not explicitly included in this progress note were reviewed by the author. Radiology/imaging not explicitly included in this  progress note was reviewed by the author.     I have performed a physical exam and reviewed and updated ROS and Plan today (3/15/2024).     33 minutes in directly providing and coordinating care and extensive data review.  No time overlap and excludes procedures.

## 2024-03-16 ENCOUNTER — PHARMACY VISIT (OUTPATIENT)
Dept: PHARMACY | Facility: MEDICAL CENTER | Age: 69
End: 2024-03-16
Payer: COMMERCIAL

## 2024-03-16 VITALS
WEIGHT: 253.53 LBS | OXYGEN SATURATION: 92 % | TEMPERATURE: 97.8 F | RESPIRATION RATE: 17 BRPM | BODY MASS INDEX: 29.94 KG/M2 | HEART RATE: 70 BPM | HEIGHT: 77 IN | DIASTOLIC BLOOD PRESSURE: 74 MMHG | SYSTOLIC BLOOD PRESSURE: 116 MMHG

## 2024-03-16 PROBLEM — A41.9 SEPTIC SHOCK (HCC): Status: RESOLVED | Noted: 2024-03-07 | Resolved: 2024-03-16

## 2024-03-16 PROBLEM — E87.1 HYPONATREMIA: Status: RESOLVED | Noted: 2024-03-11 | Resolved: 2024-03-16

## 2024-03-16 PROBLEM — R65.21 SEPTIC SHOCK (HCC): Status: RESOLVED | Noted: 2024-03-07 | Resolved: 2024-03-16

## 2024-03-16 PROBLEM — A41.9 SEVERE SEPSIS (HCC): Status: RESOLVED | Noted: 2024-03-07 | Resolved: 2024-03-16

## 2024-03-16 PROBLEM — J96.01 ACUTE HYPOXIC RESPIRATORY FAILURE (HCC): Status: RESOLVED | Noted: 2022-05-26 | Resolved: 2024-03-16

## 2024-03-16 PROBLEM — N17.9 ACUTE KIDNEY INJURY SUPERIMPOSED ON CHRONIC KIDNEY DISEASE (HCC): Status: RESOLVED | Noted: 2017-09-27 | Resolved: 2024-03-16

## 2024-03-16 PROBLEM — N18.9 ACUTE KIDNEY INJURY SUPERIMPOSED ON CHRONIC KIDNEY DISEASE (HCC): Status: RESOLVED | Noted: 2017-09-27 | Resolved: 2024-03-16

## 2024-03-16 PROBLEM — R22.32 AXILLARY MASS, LEFT: Status: RESOLVED | Noted: 2024-03-09 | Resolved: 2024-03-16

## 2024-03-16 PROBLEM — R65.20 SEVERE SEPSIS (HCC): Status: RESOLVED | Noted: 2024-03-07 | Resolved: 2024-03-16

## 2024-03-16 LAB — GLUCOSE BLD STRIP.AUTO-MCNC: 136 MG/DL (ref 65–99)

## 2024-03-16 PROCEDURE — 700102 HCHG RX REV CODE 250 W/ 637 OVERRIDE(OP): Performed by: STUDENT IN AN ORGANIZED HEALTH CARE EDUCATION/TRAINING PROGRAM

## 2024-03-16 PROCEDURE — A9270 NON-COVERED ITEM OR SERVICE: HCPCS

## 2024-03-16 PROCEDURE — 700102 HCHG RX REV CODE 250 W/ 637 OVERRIDE(OP)

## 2024-03-16 PROCEDURE — A9270 NON-COVERED ITEM OR SERVICE: HCPCS | Performed by: INTERNAL MEDICINE

## 2024-03-16 PROCEDURE — 700102 HCHG RX REV CODE 250 W/ 637 OVERRIDE(OP): Performed by: INTERNAL MEDICINE

## 2024-03-16 PROCEDURE — 82962 GLUCOSE BLOOD TEST: CPT

## 2024-03-16 PROCEDURE — A9270 NON-COVERED ITEM OR SERVICE: HCPCS | Performed by: STUDENT IN AN ORGANIZED HEALTH CARE EDUCATION/TRAINING PROGRAM

## 2024-03-16 PROCEDURE — 99239 HOSP IP/OBS DSCHRG MGMT >30: CPT | Performed by: STUDENT IN AN ORGANIZED HEALTH CARE EDUCATION/TRAINING PROGRAM

## 2024-03-16 PROCEDURE — 700101 HCHG RX REV CODE 250: Performed by: NURSE PRACTITIONER

## 2024-03-16 PROCEDURE — 700111 HCHG RX REV CODE 636 W/ 250 OVERRIDE (IP): Performed by: INTERNAL MEDICINE

## 2024-03-16 PROCEDURE — 700111 HCHG RX REV CODE 636 W/ 250 OVERRIDE (IP): Performed by: STUDENT IN AN ORGANIZED HEALTH CARE EDUCATION/TRAINING PROGRAM

## 2024-03-16 PROCEDURE — RXMED WILLOW AMBULATORY MEDICATION CHARGE: Performed by: STUDENT IN AN ORGANIZED HEALTH CARE EDUCATION/TRAINING PROGRAM

## 2024-03-16 RX ORDER — TACROLIMUS 0.5 MG/1
0.5 CAPSULE ORAL EVERY EVENING
Qty: 60 CAPSULE | Refills: 3 | Status: SHIPPED | OUTPATIENT
Start: 2024-03-16

## 2024-03-16 RX ORDER — AMOXICILLIN AND CLAVULANATE POTASSIUM 875; 125 MG/1; MG/1
1 TABLET, FILM COATED ORAL EVERY 12 HOURS
Qty: 60 TABLET | Refills: 0 | Status: ACTIVE | OUTPATIENT
Start: 2024-03-16 | End: 2024-04-15

## 2024-03-16 RX ORDER — LINEZOLID 600 MG/1
600 TABLET, FILM COATED ORAL EVERY 12 HOURS
Qty: 28 TABLET | Refills: 0 | Status: ACTIVE | OUTPATIENT
Start: 2024-03-16 | End: 2024-03-30

## 2024-03-16 RX ORDER — SODIUM BICARBONATE 650 MG/1
650 TABLET ORAL
Qty: 120 TABLET | Refills: 3 | Status: SHIPPED | OUTPATIENT
Start: 2024-03-16

## 2024-03-16 RX ORDER — GABAPENTIN 400 MG/1
400 CAPSULE ORAL 2 TIMES DAILY
Qty: 90 CAPSULE | Refills: 1 | Status: SHIPPED | OUTPATIENT
Start: 2024-03-16

## 2024-03-16 RX ADMIN — GABAPENTIN 400 MG: 400 CAPSULE ORAL at 05:08

## 2024-03-16 RX ADMIN — SODIUM BICARBONATE 650 MG: 650 TABLET ORAL at 09:14

## 2024-03-16 RX ADMIN — LINEZOLID 600 MG: 600 TABLET, FILM COATED ORAL at 05:08

## 2024-03-16 RX ADMIN — SODIUM CHLORIDE, PRESERVATIVE FREE 10 ML: 5 INJECTION INTRAVENOUS at 06:00

## 2024-03-16 RX ADMIN — PREDNISONE 5 MG: 10 TABLET ORAL at 05:08

## 2024-03-16 RX ADMIN — TACROLIMUS 1 MG: 0.5 CAPSULE ORAL at 05:08

## 2024-03-16 RX ADMIN — AMOXICILLIN AND CLAVULANATE POTASSIUM 1 TABLET: 875; 125 TABLET, FILM COATED ORAL at 05:08

## 2024-03-16 RX ADMIN — ACETAMINOPHEN 650 MG: 325 TABLET, FILM COATED ORAL at 10:48

## 2024-03-16 ASSESSMENT — PAIN DESCRIPTION - PAIN TYPE
TYPE: ACUTE PAIN

## 2024-03-16 ASSESSMENT — ENCOUNTER SYMPTOMS
HEADACHES: 0
CHILLS: 0
VOMITING: 0
NAUSEA: 0
ABDOMINAL PAIN: 0
PALPITATIONS: 0
SHORTNESS OF BREATH: 0
WEAKNESS: 0
SPUTUM PRODUCTION: 0
FEVER: 0

## 2024-03-16 NOTE — CARE PLAN
The patient is Stable - Low risk of patient condition declining or worsening    Shift Goals  Clinical Goals: VSS, oral abx, and control pain  Patient Goals: Sleep  Family Goals: No family at bedside    Progress made toward(s) clinical / shift goals:    Problem: Knowledge Deficit - Standard  Goal: Patient and family/care givers will demonstrate understanding of plan of care, disease process/condition, diagnostic tests and medications  Description: Target End Date:  1-3 days or as soon as patient condition allows    Document in Patient Education    1.  Patient and family/caregiver oriented to unit, equipment, visitation policy and means for communicating concern  2.  Complete/review Learning Assessment  3.  Assess knowledge level of disease process/condition, treatment plan, diagnostic tests and medications  4.  Explain disease process/condition, treatment plan, diagnostic tests and medications  Outcome: Progressing     Problem: Pain - Standard  Goal: Alleviation of pain or a reduction in pain to the patient’s comfort goal  Description: Target End Date:  Prior to discharge or change in level of care    Document on Vitals flowsheet    1.  Document pain using the appropriate pain scale per order or unit policy  2.  Educate and implement non-pharmacologic comfort measures (i.e. relaxation, distraction, massage, cold/heat therapy, etc.)  3.  Pain management medications as ordered  4.  Reassess pain after pain med administration per policy  5.  If opiods administered assess patient's response to pain medication is appropriate per POSS sedation scale  6.  Follow pain management plan developed in collaboration with patient and interdisciplinary team (including palliative care or pain specialists if applicable)  Outcome: Progressing     Problem: Hemodynamics  Goal: Patient's hemodynamics, fluid balance and neurologic status will be stable or improve  Description: Target End Date:  Prior to discharge or change in level of  care    Document on Assessment and I/O flowsheet templates    1.  Monitor vital signs, pulse oximetry and cardiac monitor per provider order and/or policy  2.  Maintain blood pressure per provider order  3.  Hemodynamic monitoring per provider order  4.  Manage IV fluids and IV infusions  5.  Monitor intake and output  6.  Daily weights per unit policy or provider order  7.  Assess peripheral pulses and capillary refill  8.  Assess color and body temperature  9.  Position patient for maximum circulation/cardiac output  10. Monitor for signs/symptoms of excessive bleeding  11. Assess mental status, restlessness and changes in level of consciousness  12. Monitor temperature and report fever or hypothermia to provider immediately. Consideration of targeted temperature management.  Outcome: Progressing       Patient is not progressing towards the following goals:

## 2024-03-16 NOTE — DISCHARGE SUMMARY
Discharge Summary    CHIEF COMPLAINT ON ADMISSION  Chief Complaint   Patient presents with    Flu Like Symptoms     C/o flu like symptoms since yesterday. + body aches and chills. + lightheadedness.     Wound Check     Also c/o left wound possible infection. Skin cancer ( left sided near axilla )        Reason for Admission  Body Aches/Chills     Admission Date  3/6/2024    CODE STATUS  Full Code    HPI & HOSPITAL COURSE    Jama Altman is a 68 y.o. male w/ a hx of afib recent watchman procedure,  CAD, renal transplant 2010 (hx of polycystic kidney disease), HLD, HTN and PAD.  He was admitted 3/6/2024 with weakness.  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  He had IR place a drain in left axilla 3/7.  A CT scan showed a large left axillary mass with necrosis.  He was hypotensive and received boluses and admitted with septic shock.     S/p IR drain placement 3/7. Repeat shoulder CT 3/11: large, complex left axillary fluid collection. Slightly decreased in size from prior imaging.     IR repositioned and up sized drain on 3/14.  Patient will discharge home on 3/16 with drain in place.  He will follow-up with infectious disease outpatient for drain management and ongoing antibiotics.  Ultimately, patient may require surgical intervention for necrotic tissue that is present.  I advised patient to follow-up with [facility] within the next week. Patient will also need to follow up with [facility].    Patient will be discharged home on p.o. Augmentin and p.o. linezolid.  At this time there is no end date due to ongoing abscess management.    Home health has been ordered to help with drain management.    Therefore, he is discharged in good and stable condition to home with close outpatient follow-up.    The patient met 2-midnight criteria for an inpatient stay at the time of discharge.    Discharge Date  3/16/2024    FOLLOW UP ITEMS  POST DISCHARGE  Follow-up with infectious disease 3 to 4 days  Follow-up with home health  Follow-up with [facility] 1 week  Follow-up with primary care 1 week    DISCHARGE DIAGNOSES  Principal Problem:    Abscess of axilla, left (POA: Unknown)  Active Problems:    Immunosuppressed status (HCC) (POA: Yes)    Squamous cell carcinoma of skin (POA: Unknown)    History of renal transplant (POA: Yes)      Overview:     Thrombocytopenia (HCC) (POA: Yes)    Type 2 diabetes mellitus (HCC) (POA: Yes)    ACP (advance care planning) (POA: Unknown)    Adrenal insufficiency (HCC) (POA: Unknown)    Primary hypertension (POA: Yes)      Overview:     Hyperlipidemia (POA: Yes)      Overview:     Coronary artery disease (POA: Yes)    GERD (gastroesophageal reflux disease) (POA: Yes)    AF (atrial fibrillation) (HCC) (POA: Yes)    Abdominal aortic aneurysm (AAA) without rupture (HCC) (POA: Yes)    Presence of Watchman left atrial appendage closure device (POA: Yes)    Peripheral neuropathy (POA: Unknown)  Resolved Problems:    Acute kidney injury superimposed on chronic kidney disease (HCC) (POA: Yes)    Severe sepsis (HCC) (POA: Yes)    Left arm swelling (POA: Unknown)    Axillary mass, left (POA: Yes)    Hyponatremia (POA: Unknown)    Acute hypoxic respiratory failure (HCC) (POA: Yes)    Septic shock (HCC) (POA: Yes)      FOLLOW UP  Future Appointments   Date Time Provider Department Center   3/20/2024  4:20 PM Ganesh Benton III, M.D. [facility] [place]   3/22/2024  1:30 PM SHARON Gandhi [facility].   3/29/2024  3:00 PM SHARON Gandhi [address] [place] [address]   6/10/2024  8:00 AM Jama Middleton M.D. [facility] None     [facility] [place] [address] [id number]  [facility] [address]  [phone]  Follow up in 1 week(s)      Ganesh Benton III, M.D.  [address]  [facility] [address]  [phone]    Follow up in 1 week(s)      [facility]  [address]  [place] [address]  [phone]  Follow up in 3 day(s)      [facility]  [address]  [place] [address]  [phone]    Follow up in 3 day(s)      [facility]  [address]  [place] [id number] [phone]  Follow up in 1 week(s)        MEDICATIONS ON DISCHARGE     Medication List        START taking these medications        Instructions   amoxicillin-clavulanate 875-125 MG Tabs  Commonly known as: Augmentin   Take 1 Tablet by mouth every 12 hours for 30 days.  Dose: 1 Tablet     gabapentin 400 MG Caps  Commonly known as: Neurontin   Take 1 Capsule by mouth 2 times a day.  Dose: 400 mg     linezolid 600 MG Tabs  Commonly known as: Zyvox   Take 1 Tablet by mouth every 12 hours for 14 days.  Dose: 600 mg     sodium bicarbonate 650 MG Tabs  Commonly known as: Sodium Bicarbonate   Take 1 Tablet by mouth 2 (two) times a day.  Dose: 650 mg            CHANGE how you take these medications        Instructions   tacrolimus 0.5 MG Caps  What changed:   medication strength  how much to take  when to take this  Commonly known as: Prograf   Take 1 Capsule by mouth every evening.  Dose: 0.5 mg            CONTINUE taking these medications        Instructions   aspirin 81 MG EC tablet   Take 1 Tablet by mouth every day.  Dose: 81 mg     atorvastatin 80 MG tablet  Commonly known as: Lipitor   Take 1 Tablet by mouth at bedtime.  Dose: 80 mg     glyBURIDE 5 MG Tabs  Commonly known as: Diabeta   Take 2 Tablets by mouth 2 times a day with meals.  Dose: 10 mg     metoprolol SR 25 MG Tb24  Commonly known as: Toprol XL   Take 1 Tablet by mouth every day. May take additional one tab daily for heart rate sustaining >110 BPM.  Dose: 25 mg     omeprazole 20 MG delayed-release capsule  Commonly known as: PriLOSEC   Take 1 Capsule by mouth every day.  Dose: 20 mg     pioglitazone 45 MG Tabs  Commonly known as: Actos   TAKE ONE TABLET BY MOUTH ONCE DAILY  Dose: 45 mg      predniSONE 5 MG Tabs  Commonly known as: Deltasone   Take 1 Tablet by mouth every day.  Dose: 5 mg     tadalafil 5 MG tablet  Commonly known as: Cialis   : TAKE 1 TABLETS 30 MINUTES BEFORE SEXUAL ACTIVITY, DO NOT EXCEED MORE THAN ONE DOSE A DAY     Tradjenta 5 MG Tabs tablet  Generic drug: linagliptin   Take 1 Tablet by mouth every day.  Dose: 5 mg            STOP taking these medications      mycophenolate 500 MG tablet  Commonly known as: Cellcept              Allergies  Allergies   Allergen Reactions    Doxycycline Rash     Sweats and shakes: 9/28/17: Clarified allergy with patient. Allergy was in 1998 and he doesn't remember what happened. He thought the medication is for pain.  Tolerates doxycycline 9/2017       DIET  Orders Placed This Encounter   Procedures    Diet Order Diet: Cardiac; Fluid modifications: (optional): 1500 ml Fluid Restriction     Standing Status:   Standing     Number of Occurrences:   1     Order Specific Question:   Diet:     Answer:   Cardiac [6]     Order Specific Question:   Fluid modifications: (optional)     Answer:   1500 ml Fluid Restriction [9]       ACTIVITY  As tolerated.  Weight bearing as tolerated    CONSULTATIONS  Oncology  Infectious disease  Interventional radiology  General surgery    PROCEDURES  NICKI drain management 3/7 and 3/14    LABORATORY  Lab Results   Component Value Date    SODIUM 142 03/15/2024    POTASSIUM 3.5 (L) 03/15/2024    CHLORIDE 99 03/15/2024    CO2 29 03/15/2024    GLUCOSE 173 (H) 03/15/2024    BUN 55 (H) 03/15/2024    CREATININE 2.00 (H) 03/15/2024    CREATININE 2.4 (H) 12/18/2006        Lab Results   Component Value Date    WBC 6.6 03/15/2024    HEMOGLOBIN 13.3 (L) 03/15/2024    HEMATOCRIT 39.7 (L) 03/15/2024    PLATELETCT 133 (L) 03/15/2024        Total time of the discharge process exceeds 47 minutes.

## 2024-03-16 NOTE — PROGRESS NOTES
Received report and assumed care of patient (pt) at shift change.    Assessment completed. Pt is A&O x 4. Pt reports 3/10 pain on his shoulder. Pt medicated pt per MAR for pain.  Pt laying in bed comfortably. Vital signs are stable. IR drain noted with NICKI bulb to suction. DIP, CDI.     Fall precaution in place:  Bed is in lowest, locked position, call light and belongings are within reach. Pt educated to call for assistance when OOB, and bed alarm is on.  Plan of care discussed and all questions answered. All other needs met at this time.

## 2024-03-16 NOTE — CARE PLAN
The patient is Watcher - Medium risk of patient condition declining or worsening    Shift Goals  Clinical Goals: monitor NICKI, IV abx  Patient Goals: NICKI drain education, rest  Family Goals: N/A    Progress made toward(s) clinical / shift goals:  Patient is AxO x4 and understands plan of care, all questions answered at this time. Call light and personal belongings are within reach. Pt calls appropriately for nursing needs. Frequent rounding in place. Bed is locked and in lowest position.    Patient is not progressing towards the following goals: NA

## 2024-03-16 NOTE — PROGRESS NOTES
Radiology Progress Note   Author: LARRY Lujan   Date & Time created: 3/16/2024  1:32 PM   Date of admission  3/6/2024  Note to reader: this note follows the APSO format rather than the historical SOAP format. Assessment and plan located at the top of the note for ease of use.    Chief Complaint  68 y.o. male admitted 3/6/2024 with   Chief Complaint   Patient presents with    Flu Like Symptoms     C/o flu like symptoms since yesterday. + body aches and chills. + lightheadedness.     Wound Check     Also c/o left wound possible infection. 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  He was admitted 03/06/2024 for CT finding of left axillary fluid collection after presenting to the ED for worsening axillary pain.  Patient underwent a left axillary abscess drain placement with IR Dr. Gutierrez on 03/07/2024.    Interval History:   03/08/24 - 12 Fr left axillary abscess drain to Left axilla with 455 mL turbid serosanguineous output in the last 24 hours. WBC 13.9. Cultures pending. On ABX. Drain flushed with 10 mL NS.     03/09/24 - 12 Fr left axillary abscess drain to Left axilla with 35 mL of serosanguineous output in the last 24 hours.  I flushed drain with 10 mL normal saline solution.  Order placed to have RNs irrigate wound with 10 mL NSS 2 times daily; I I reviewed today's labs: WBC 13.8; Hgb 11.8, Cr 2.55; Micro from axillary fluid positive for MRSA. I  Afebrile.    03/10/24 - 12 Fr left axillary abscess drain to Left axilla with 85 mL of serosanguineous output in the last 24 hours.  I flushed drain with 10 mL normal saline solution.  Order placed to have RNs irrigate wound with 10 mL NSS 2 times daily; I I reviewed today's labs: WBC 12.0;  Hgb 12.0, Cr 2.78; FSBS 208; micro from axillary fluid positive for Finegoldia magna, MRSA, enterococcus Faecalis. Afebrile.    03/11/24 - 12 Fr left axillary abscess drain to Left axilla with 5 mL of serosanguineous output in the last 24 hours. Labs reviewed: WBC 10.0; Hgb 11.9, Cr 3.03. Surgery had clamped drain earlier in the day and asked me to unclamp this afternoon and put drain back to suction. No immediate fluid from drain following placing back to suction. Pt discussed with surgery and hospitalist.     03/12/24 - 12 Fr left axillary abscess drain to Left axilla with 110 mL of serosanguineous output in the last 24 hours. Labs reviewed: WBC 8.4; Hgb 12.7, Cr 2.93. Pt discussed with surgery and hospitalist.     03/13/24 - 12 Fr left axillary abscess drain to Left axilla with 55 mL of serosanguineous output in the last 24 hours. Labs reviewed: WBC 8.8; Hgb 12.7, Cr 2.45. Pt discussed with surgery and hospitalist.     03/14/24 - 12 Fr left axillary abscess drain to Left axilla with 35 mL of serosanguineous output in the last 24 hours. Labs reviewed: WBC 7.9; Hgb 13.2, Cr 1.91. Pt discussed with hospitalist. Drain upsizing pending.     03/15/24 - Left axillary abscess drain upsized to a 14 Fr yesterday with IR Dr Deal with35 mL of serosanguineous output overnight. Labs reviewed: WBC 6.6; Hgb 13.2, Cr 2.0. Pt discussed with hospitalist and ID.     03/16/24 - 12 Fr left axillary abscess drain to Left axilla with 15 mL of serosanguineous output in the last 24 hours. No labs today. Pending discharge. Pt discussed with hospitalist.     Assessment/Plan     Principal Problem:    Abscess of axilla, left  Active Problems:    History of renal transplant    Primary hypertension    Hyperlipidemia    Coronary artery disease    Thrombocytopenia (HCC)    GERD (gastroesophageal reflux disease)    Type 2 diabetes mellitus (HCC)    AF (atrial fibrillation) (HCC)    Immunosuppressed status (HCC)    Abdominal aortic aneurysm (AAA)  without rupture (HCC)    Presence of Watchman left atrial appendage closure device    Peripheral neuropathy    Squamous cell carcinoma of skin    ACP (advance care planning)    Adrenal insufficiency (HCC)      Plan IR  - Discharging home with drain in place and ID follow up and home health  - If conservative IR drain treatment/abx unsuccessful to resolve abscess, recommend surgical follow-up.  -Education regarding drain flushing/management reinforced.  Supplies provided for home use yesterday    -Thank you for allowing Interventional Radiology team to participate in the patients care, if any additional care or requests are needed in the future please do not hesitate to call or place IR order   [phone]           Review of Systems  Physical Exam   Review of Systems   Constitutional:  Negative for chills and fever.   Respiratory:  Negative for sputum production and shortness of breath.    Cardiovascular:  Negative for chest pain and palpitations.   Gastrointestinal:  Negative for abdominal pain, nausea and vomiting.   Musculoskeletal:         Left axillary pain (improving)   Neurological:  Negative for weakness and headaches.      Vitals:    03/16/24 0816   BP: 116/74   Pulse: 70   Resp: 17   Temp: 36.6 °C (97.8 °F)   SpO2: 92%        Physical Exam  Vitals and nursing note reviewed.   Constitutional:       General: He is not in acute distress.     Appearance: Normal appearance.   Cardiovascular:      Rate and Rhythm: Normal rate.      Pulses: Normal pulses.   Pulmonary:      Effort: Pulmonary effort is normal. No respiratory distress.   Abdominal:      Palpations: Abdomen is soft.   Musculoskeletal:         General: Tenderness (Left axilla improving) present.      Comments: IR drain to left axilla   Skin:     General: Skin is warm and dry.   Neurological:      General: No focal deficit present.      Mental Status: He is alert and oriented to person, place, and time.   Psychiatric:         Attention and Perception:  Attention normal.         Mood and Affect: Mood normal.         Behavior: Behavior normal.             Labs    Recent Labs     03/14/24  0338 03/15/24  0143   WBC 7.9 6.6   RBC 4.62* 4.58*   HEMOGLOBIN 13.2* 13.3*   HEMATOCRIT 40.6* 39.7*   MCV 87.9 86.7   MCH 28.6 29.0   MCHC 32.5 33.5   RDW 44.9 43.8   PLATELETCT 143* 133*   MPV 10.9 11.1     Recent Labs     03/14/24  0338 03/15/24  0143   SODIUM 141 142   POTASSIUM 3.8 3.5*   CHLORIDE 102 99   CO2 28 29   GLUCOSE 155* 173*   BUN 58* 55*   CREATININE 1.91* 2.00*   CALCIUM 8.6 8.3*     Recent Labs     03/14/24  0338 03/15/24  0143   CREATININE 1.91* 2.00*     CT-DRAINAGE-CATH EXCHANGE   Final Result         1.  Successful removal and replacement of locking loop left axillary catheter with a new 14 [place] locking loop catheter which was placed dependently in the left axillary abscess.      CT-SHOULDER W/O LEFT   Final Result      1.  Large complex LEFT axillary fluid collection consistent with abscess, with drain in place, slightly decreased in size from prior.   2.  Overlying skin thickening again seen.      US-EXTREMITY VENOUS UPPER UNILAT LEFT   Final Result      CT-DRAIN-SKIN - SUBCUTANEOUS   Final Result      1. Ultrasound GUIDED PLACEMENT OF A PERCUTANEOUS DRAINAGE CATHETER IN A LEFT AXILLARY FLUID COLLECTION.      2.  THE CURRENT PLAN IS TO MONITOR DRAINAGE OUTPUT AND OBTAIN A FOLLOWUP CT SCAN IN 5-7 DAYS IF CLINICALLY INDICATED.      US-RENAL   Final Result      1.  Normal appearance of the right lower quadrant transplant kidney.   2.  Native left kidney demonstrates no hydronephrosis. There are innumerable cysts with very little normal-appearing parenchyma.   3.  Rounded echogenic masslike area in the inferior bladder. It is uncertain if this is related to an enlarged prostate gland protruding into the bladder base or a bladder mass. Recommend clinical correlation with history of hematuria.      CT-CHEST (THORAX) W/O   Final Result      1.  Partially  visualized large left axillary mass and necrotic adenopathy is consistent with known squamous cell carcinoma. Central hypodensity in the larger mass may be related to necrosis.      2.  0.9 cm pleural-based nodule in the right lower lobe is nonspecific, possibly present on the prior exam where there were patchy opacities. Follow-up per oncology protocol      3.  Patchy groundglass density and subsegmental atelectasis appears slightly improved from the prior CT. Findings are consistent with a chronic inflammatory/infectious process.      Fleischner Society pulmonary nodule recommendations:   Not Applicable           INR   Date Value Ref Range Status   03/06/2024 1.34 (H) 0.87 - 1.13 Final     Comment:     INR - Non-therapeutic Reference Range: 0.87-1.13  INR - Therapeutic Reference Range: 2.0-4.0       POC INR   Date Value Ref Range Status   08/03/2022 2.1 (H) 0.9 - 1.2 Final     Comment:     INR - Non-therapeutic Reference Range: 0.9-1.2  INR - Therapeutic Reference Range: 2.0-4.0          Intake/Output Summary (Last 24 hours) at 3/8/2024 0833  Last data filed at 3/8/2024 0655  Gross per 24 hour   Intake 100 ml   Output 1025 ml   Net -925 ml      Labs not explicitly included in this progress note were reviewed by the author. Radiology/imaging not explicitly included in this progress note was reviewed by the author.     I have performed a physical exam and reviewed and updated ROS and Plan today (3/16/2024).     33 minutes in directly providing and coordinating care and extensive data review.  No time overlap and excludes procedures.

## 2024-03-16 NOTE — PROGRESS NOTES
Pt was discharged home. PIV was removed. Med rec completed. Education provided on how to flush drain and dressing change.

## 2024-03-17 LAB
BACTERIA WND AEROBE CULT: ABNORMAL
GRAM STN SPEC: ABNORMAL
SIGNIFICANT IND 70042: ABNORMAL
SITE SITE: ABNORMAL
SOURCE SOURCE: ABNORMAL

## 2024-03-18 ENCOUNTER — PATIENT MESSAGE (OUTPATIENT)
Dept: HEALTH INFORMATION MANAGEMENT | Facility: OTHER | Age: 69
End: 2024-03-18

## 2024-03-18 ENCOUNTER — TELEPHONE (OUTPATIENT)
Dept: HEALTH INFORMATION MANAGEMENT | Facility: OTHER | Age: 69
End: 2024-03-18
Payer: MEDICARE

## 2024-03-18 ENCOUNTER — PATIENT OUTREACH (OUTPATIENT)
Dept: MEDICAL GROUP | Facility: PHYSICIAN GROUP | Age: 69
End: 2024-03-18
Payer: MEDICARE

## 2024-03-18 NOTE — PROGRESS NOTES
Transitional Care Management  TCM Outreach Date and Time: Filed (3/18/2024  9:37 AM)    Discharge Questions  Actual Discharge Date: 03/16/24  Now that you are home, how are you feeling?: Good  Did you receive any new prescriptions?: Yes  Were you able to get them filled?: Yes  Meds to Bed or Pharmacy filled?: Pharmacy  Do you have any questions about your current medications or new medications (Review Med Rec)?: No  Did you have any durable medical equipment ordered?: No  Do you have a follow up appointment scheduled with your PCP?: Yes  Appointment Date: 03/20/24  Appointment Time: 1330  Any issues or paperwork you wish to discuss with your PCP?: No  Are you (patient) able to get to the appointment?: Yes  If Home Health was ordered, have they contacted you (Patient): Not Applicable  Did you have enough support after your last discharge?: Yes  Does this patient qualify for the CCM program?: Yes    Transitional Care  Number of attempts made to contact patient: 1  Current or previous attempts completed within two business days of discharge? : Yes  Provided education regarding treatment plan, medications, self-management, ADLs?: No  Has patient completed an Advanced Directive?: No  Has the Care Manager's phone number provided?: No  Is there anything else I can help you with?: No    Discharge Summary  Chief Complaint: flu like symptoms  Admitting Diagnosis: Abscess of axilla, left  Discharge Diagnosis: Abscess of axilla, left

## 2024-03-19 RX ORDER — OMEPRAZOLE 20 MG/1
20 CAPSULE, DELAYED RELEASE ORAL DAILY
Qty: 90 CAPSULE | Refills: 3 | Status: SHIPPED | OUTPATIENT
Start: 2024-03-19

## 2024-03-19 NOTE — TELEPHONE ENCOUNTER
Received request via: Pharmacy    Was the patient seen in the last year in this department? Yes    Does the patient have an active prescription (recently filled or refills available) for medication(s) requested? No    Pharmacy Name: [facility]    Does the patient have USP Plus and need 100 day supply (blood pressure, diabetes and cholesterol meds only)? Medication is not for cholesterol, blood pressure or diabetes

## 2024-03-20 ENCOUNTER — HOME CARE VISIT (OUTPATIENT)
Dept: HOME HEALTH SERVICES | Facility: HOME HEALTHCARE | Age: 69
End: 2024-03-20

## 2024-03-20 ENCOUNTER — OFFICE VISIT (OUTPATIENT)
Dept: MEDICAL GROUP | Facility: PHYSICIAN GROUP | Age: 69
End: 2024-03-20
Payer: MEDICARE

## 2024-03-20 VITALS
RESPIRATION RATE: 18 BRPM | HEART RATE: 74 BPM | HEIGHT: 77 IN | OXYGEN SATURATION: 96 % | DIASTOLIC BLOOD PRESSURE: 68 MMHG | SYSTOLIC BLOOD PRESSURE: 120 MMHG | TEMPERATURE: 97.7 F | WEIGHT: 245 LBS | BODY MASS INDEX: 28.93 KG/M2

## 2024-03-20 DIAGNOSIS — Z09 HOSPITAL DISCHARGE FOLLOW-UP: ICD-10-CM

## 2024-03-20 DIAGNOSIS — R19.7 DIARRHEA, UNSPECIFIED TYPE: ICD-10-CM

## 2024-03-20 DIAGNOSIS — G31.9 CEREBRAL ATROPHY (HCC): ICD-10-CM

## 2024-03-20 PROCEDURE — 3074F SYST BP LT 130 MM HG: CPT | Performed by: FAMILY MEDICINE

## 2024-03-20 PROCEDURE — 99213 OFFICE O/P EST LOW 20 MIN: CPT | Performed by: FAMILY MEDICINE

## 2024-03-20 PROCEDURE — 3078F DIAST BP <80 MM HG: CPT | Performed by: FAMILY MEDICINE

## 2024-03-20 PROCEDURE — 99231 SBSQ HOSP IP/OBS SF/LOW 25: CPT | Performed by: FAMILY MEDICINE

## 2024-03-20 RX ORDER — DIPHENOXYLATE HYDROCHLORIDE AND ATROPINE SULFATE 2.5; .025 MG/1; MG/1
1 TABLET ORAL 4 TIMES DAILY PRN
Qty: 30 TABLET | Refills: 0 | Status: SHIPPED | OUTPATIENT
Start: 2024-03-20 | End: 2024-03-28

## 2024-03-20 ASSESSMENT — FIBROSIS 4 INDEX: FIB4 SCORE: 3.12

## 2024-03-20 NOTE — PROGRESS NOTES
Subjective:     CC: Hospital follow-up.    HPI:   Jama presents today with the following medical concerns:    Hospital discharge follow-up  This is a new issue.  Patient is here for follow-up for hospitalization of 3/6-3/16 for abscess to the left axilla in area of metastatic squamous cell carcinoma.  He is currently on Augmentin and having GI upset with diarrhea.  He is due to see infectious disease in 2 days and will ask them about that.  He is asking for something like Lomotil to help him in the meantime.  He is trying probiotics but those are not helping much.  He was hoping to get surgery soon to clean out the cancer so he can start on appropriate oncology treatment.  His case is very complicated by his immunosuppressed state.    Cerebral atrophy (HCC)  This is a chronic problem.  Noted on previous x-ray study.  Likely due to aging.    Past Medical History:   Diagnosis Date    A-fib (HCC)     Arrhythmia     Benign essential hypertension     Diabetes (HCC)     type 2    Heart burn     Hemorrhagic disorder (HCC)     Eliquis    Hyperlipoproteinemia     Hypertension     not on meds anymore    Indigestion     Myocardial infarct (HCC) 2013    stent    Polycystic kidney 09/10/2010    RIGHT KIDNEY TRANSPLANT    [facility] left atrial appendage closure device 02/29/2024    Sleep apnea 01/30/2024    states he was told he had sleep apnea but has not had a sleep study    Snoring        Social History     Tobacco Use    Smoking status: Never     Passive exposure: Never    Smokeless tobacco: Never   Vaping Use    Vaping Use: Never used   Substance Use Topics    Alcohol use: No    Drug use: No       Current Outpatient Medications Ordered in Epic   Medication Sig Dispense Refill    gabapentin (NEURONTIN) 300 MG Cap Take 600 mg by mouth 2 times a day. Indications: Diabetes with Nerve Disease      diphenoxylate-atropine (LOMOTIL) 2.5-0.025 MG Tab Take 1 Tablet by mouth 4 times a day as needed for Diarrhea for up to 8  days. 30 Tablet 0    omeprazole (PRILOSEC) 20 MG delayed-release capsule TAKE 1 CAPSULE BY MOUTH EVERY DAY 90 Capsule 3    gabapentin (NEURONTIN) 400 MG Cap Take 1 Capsule by mouth 2 times a day. 90 Capsule 1    tacrolimus (PROGRAF) 0.5 MG Cap Take 1 Capsule by mouth every evening. (Patient taking differently: Take 0.5 mg by mouth every evening. one in the morning and half at night  Indications: kidney transplant) 60 Capsule 3    amoxicillin-clavulanate (AUGMENTIN) 875-125 MG Tab Take 1 Tablet by mouth every 12 hours for 30 days. 60 Tablet 0    linezolid (ZYVOX) 600 MG Tab Take 1 Tablet by mouth every 12 hours for 14 days. 28 Tablet 0    sodium bicarbonate (SODIUM BICARBONATE) 650 MG Tab Take 1 Tablet by mouth 2 (two) times a day. 120 Tablet 3    metoprolol SR (TOPROL XL) 25 MG TABLET SR 24 HR Take 1 Tablet by mouth every day. May take additional one tab daily for heart rate sustaining >110 BPM. 100 Tablet 3    atorvastatin (LIPITOR) 80 MG tablet Take 1 Tablet by mouth at bedtime. 100 Tablet 3    tadalafil (CIALIS) 5 MG tablet : TAKE 1 TABLETS 30 MINUTES BEFORE SEXUAL ACTIVITY, DO NOT EXCEED MORE THAN ONE DOSE A DAY 15 Tablet 3    aspirin 81 MG EC tablet Take 1 Tablet by mouth every day. 100 Tablet 1    pioglitazone (ACTOS) 45 MG Tab TAKE ONE TABLET BY MOUTH ONCE DAILY 100 Tablet 3    linagliptin (TRADJENTA) 5 MG Tab tablet Take 1 Tablet by mouth every day. 100 Tablet 3    glyBURIDE (DIABETA) 5 MG Tab Take 2 Tablets by mouth 2 times a day with meals. 400 Tablet 3    predniSONE (DELTASONE) 5 MG Tab Take 1 Tablet by mouth every day. 30 Tablet 0     No current Epic-ordered facility-administered medications on file.       Allergies:  Doxycycline    Health Maintenance: Completed    ROS:  Gen: no fevers/chills, no changes in weight  Eyes: no changes in vision  ENT: no sore throat, no hearing loss, no bloody nose  Pulm: no sob, no cough  CV: no chest pain, no palpitations  : no dysuria  MSk: no myalgias  Skin: no  "rash  Neuro: no headaches, no numbness/tingling  Heme/Lymph: no easy bruising      Objective:       Exam:  /68 (BP Location: Right arm, Patient Position: Sitting, BP Cuff Size: Adult)   Pulse 74   Temp 36.5 °C (97.7 °F) (Temporal)   Resp 18   Ht 1.956 m (6' 5\")   Wt 111 kg (245 lb)   SpO2 96%   BMI 29.05 kg/m²  Body mass index is 29.05 kg/m².    Gen: Alert and oriented, No apparent distress.  Lungs: Normal effort,   Ext: No clubbing, cyanosis, edema.      Labs: Reviewed    Assessment & Plan:     68 y.o. male with the following -     1. Diarrhea, unspecified type  This is an acute problem.  His toes most likely is due to his antibiotics.  He will talk to infectious disease on Friday to see if he can be changed.  The meantime we will give him Lomotil.  - diphenoxylate-atropine (LOMOTIL) 2.5-0.025 MG Tab; Take 1 Tablet by mouth 4 times a day as needed for Diarrhea for up to 8 days.  Dispense: 30 Tablet; Refill: 0    2. Cerebral atrophy (HCC)  This a chronic problem.  Likely due to aging.  Stable.    3. Hospital discharge follow-up  This is an acute issue.  We discussed his recent hospitalization.  I did read the surgeon's note this is is going to get with oncology so they can make a plan of action and hopefully get surgery soon as possible and then proceed with his oncology treatment.    HCC Gap Form    Diagnosis to address: G31.9 - Cerebral atrophy (HCC)  Assessment and plan: Chronic, stable. Continue with current defined treatment plan: Likely due to aging. Follow-up at least annually.  Last edited 03/20/24 16:29 PDT by Ganesh Benton III, M.D.         Return if symptoms worsen or fail to improve.    Please note that this dictation was created using voice recognition software. I have made every reasonable attempt to correct obvious errors, but I expect that there are errors of grammar and possibly content that I did not discover before finalizing the note.        "

## 2024-03-20 NOTE — ASSESSMENT & PLAN NOTE
This is a new issue.  Patient is here for follow-up for hospitalization of 3/6-3/16 for abscess to the left axilla in area of metastatic squamous cell carcinoma.  He is currently on Augmentin and having GI upset with diarrhea.  He is due to see infectious disease in 2 days and will ask them about that.  He is asking for something like Lomotil to help him in the meantime.  He is trying probiotics but those are not helping much.  He was hoping to get surgery soon to clean out the cancer so he can start on appropriate oncology treatment.  His case is very complicated by his immunosuppressed state.

## 2024-03-22 ENCOUNTER — OFFICE VISIT (OUTPATIENT)
Dept: INFECTIOUS DISEASES | Facility: MEDICAL CENTER | Age: 69
End: 2024-03-22
Attending: NURSE PRACTITIONER
Payer: MEDICARE

## 2024-03-22 VITALS
WEIGHT: 235 LBS | OXYGEN SATURATION: 95 % | TEMPERATURE: 98 F | HEIGHT: 77 IN | SYSTOLIC BLOOD PRESSURE: 116 MMHG | DIASTOLIC BLOOD PRESSURE: 62 MMHG | HEART RATE: 80 BPM | BODY MASS INDEX: 27.75 KG/M2

## 2024-03-22 DIAGNOSIS — N18.32 STAGE 3B CHRONIC KIDNEY DISEASE: ICD-10-CM

## 2024-03-22 DIAGNOSIS — A49.9 POLYMICROBIAL BACTERIAL INFECTION: ICD-10-CM

## 2024-03-22 DIAGNOSIS — Z94.0 HISTORY OF RENAL TRANSPLANT: ICD-10-CM

## 2024-03-22 DIAGNOSIS — L02.412 ABSCESS OF AXILLA, LEFT: ICD-10-CM

## 2024-03-22 DIAGNOSIS — D63.8 ANEMIA OF CHRONIC DISEASE: ICD-10-CM

## 2024-03-22 DIAGNOSIS — C44.92 SQUAMOUS CELL CARCINOMA OF SKIN: ICD-10-CM

## 2024-03-22 DIAGNOSIS — D84.9 IMMUNOSUPPRESSED STATUS (HCC): ICD-10-CM

## 2024-03-22 DIAGNOSIS — D69.6 THROMBOCYTOPENIA (HCC): ICD-10-CM

## 2024-03-22 PROCEDURE — 3074F SYST BP LT 130 MM HG: CPT | Performed by: NURSE PRACTITIONER

## 2024-03-22 PROCEDURE — 99213 OFFICE O/P EST LOW 20 MIN: CPT | Performed by: NURSE PRACTITIONER

## 2024-03-22 PROCEDURE — 3078F DIAST BP <80 MM HG: CPT | Performed by: NURSE PRACTITIONER

## 2024-03-22 PROCEDURE — 99211 OFF/OP EST MAY X REQ PHY/QHP: CPT | Performed by: NURSE PRACTITIONER

## 2024-03-22 ASSESSMENT — ENCOUNTER SYMPTOMS
WEIGHT LOSS: 0
CONSTIPATION: 0
SHORTNESS OF BREATH: 0
DIZZINESS: 0
BLURRED VISION: 0
FEVER: 0
BRUISES/BLEEDS EASILY: 0
MYALGIAS: 0
DOUBLE VISION: 0
COUGH: 0
WHEEZING: 0
NERVOUS/ANXIOUS: 0
PALPITATIONS: 0
NAUSEA: 1
HEADACHES: 0
CHILLS: 0
ABDOMINAL PAIN: 0
FOCAL WEAKNESS: 1
VOMITING: 0
DIARRHEA: 0
SPUTUM PRODUCTION: 0

## 2024-03-22 ASSESSMENT — FIBROSIS 4 INDEX: FIB4 SCORE: 3.12

## 2024-03-22 NOTE — PROGRESS NOTES
INFECTIOUS  DISEASE  OUTPATIENT CLINIC  NOTE   Subjective   Primary care provider: Ganesh Benton III, M.D..     Reason for Follow Up:   Follow-up for   1. Polymicrobial bacterial infection  CBC WITH DIFFERENTIAL    Comp Metabolic Panel      2. Abscess of axilla, left        3. Squamous cell carcinoma of skin        4. Immunosuppressed status (HCC)        5. Thrombocytopenia (HCC)        6. Anemia of chronic disease        7. Stage 3b chronic kidney disease (HCC)        8. History of renal transplant            HPI: Patient previously seen and treated by ID team as inpatient during hospital admission.   Jama Altman is a 68 y.o. male with hx of afib recent watchman procedure,  CAD, renal transplant 2010 (hx of polycystic kidney disease), HLD, HTN and PAD.Admitted 3/6/2024 for nonhealing wound left axilla. He was recently diagnosed with SCCA after biopsy of left axillary mass  2/23/24. CT chest + partially visualized large left axillary mass and necrotic adenopathy consistent with known SCC.  Patchy groundglass density and subsegmental atelectasis, consistent with chronic inflammatory/infectious process. Drain placed 3/7-cultures of fluid polymicrobial + MRSA (only oral option available is Zyvox), E- faecalis, Fingoldia. Repeat  CT 3/11 : Irregular fluid collection in the LEFT axillary subcutaneous soft tissues measuring 8.3 x 5.1 x 11.1 cm with gas bubbles and drainage catheter in place. IR repositioned and up sized drain on 3/14.  Patient will discharge home on 3/16 with drain in place.  He will follow-up with infectious disease outpatient for drain management and ongoing antibiotics.  Ultimately, patient may require surgical intervention for necrotic tissue that is present.  Patient was discharged home on a 14-day course of p.o. Augmentin 875/125 mg twice daily and p.o. Zyvox 600 mg twice daily.     03/22/24-today the patient reports that he has had having nausea while on both Augmentin and Zyvox.  Due to  limited antibiotic options recommend spacing antibiotics by 2 hours and continue taking probiotic to see if he has any relief in symptoms.  He does have as needed Zofran available.  He denies any vomiting, fever, chills, diarrhea.  Left axillary abscess/tumor site with continued drainage.  Patient is keeping a log of drainage output which is slowing compared to hospital admission.  Continues to have 30 to 50 mL of output a day.  We discussed antibiotic treatment options.  Due to his MRSA and history of renal cell transplant he is limited to only oral Zyvox for antibiotic treatment which can only be used for 2 weeks before causing worsening thrombocytopenia.  Patient already has thrombocytopenia 137.  Augmentin is covering the E faecalis and Feingoldia.  Recommended to finish 14-day course of antibiotic therapy which ends on 3/30/24 as there is no good oral options for long-term suppression.  Due to the patient having a drain in place with high output will hopefully prevent worsening infection.  Recommend ultimately to have surgical intervention as necrotic tissue will become a nidus for infection.  Education provided on signs and symptoms of worsening infection and when to report to ER/call 911.  Repeat lab work CBC/CMP in 1 week for close monitoring.  Previous lab work reviewed and discussed with patient from 3/15/2024, WBC 6.6, stable anemia, thrombocytopenia 133, chronic history of elevated creatinine 2.0 and decreased GFR 36 stable.       Current Antimicrobials: 14-day course of p.o. Augmentin 875/125 mg twice daily and p.o. Zyvox 600 mg twice daily, end date 3/30/2024  Previous Antimicrobials: Unasyn,    Other Current Medications:  Home Medications    Medication Sig Taking? Last Dose Authorizing Provider   diphenoxylate-atropine (LOMOTIL) 2.5-0.025 MG Tab Take 1 Tablet by mouth 4 times a day as needed for Diarrhea for up to 8 days. Yes Taking Ganesh Benton III, M.D.   omeprazole (PRILOSEC) 20 MG  delayed-release capsule TAKE 1 CAPSULE BY MOUTH EVERY DAY Yes Taking Ganesh Benton III, M.D.   gabapentin (NEURONTIN) 400 MG Cap Take 1 Capsule by mouth 2 times a day. Yes Taking Spring Romero M.D.   tacrolimus (PROGRAF) 0.5 MG Cap Take 1 Capsule by mouth every evening.  Patient taking differently: Take 0.5 mg by mouth every evening. one in the morning and half at night  Indications: kidney transplant Yes Taking Spring Romero M.D.   amoxicillin-clavulanate (AUGMENTIN) 875-125 MG Tab Take 1 Tablet by mouth every 12 hours for 30 days. Yes Taking Spring Romero M.D.   linezolid (ZYVOX) 600 MG Tab Take 1 Tablet by mouth every 12 hours for 14 days. Yes Taking Spring Romero M.D.   sodium bicarbonate (SODIUM BICARBONATE) 650 MG Tab Take 1 Tablet by mouth 2 (two) times a day. Yes Taking Spring Romero M.D.   metoprolol SR (TOPROL XL) 25 MG TABLET SR 24 HR Take 1 Tablet by mouth every day. May take additional one tab daily for heart rate sustaining >110 BPM. Yes Taking JENNIFER CadetRCOLETTE   atorvastatin (LIPITOR) 80 MG tablet Take 1 Tablet by mouth at bedtime. Yes Taking Ganesh Benton III, M.D.   tadalafil (CIALIS) 5 MG tablet : TAKE 1 TABLETS 30 MINUTES BEFORE SEXUAL ACTIVITY, DO NOT EXCEED MORE THAN ONE DOSE A DAY Yes Taking Ganesh Benton III, M.D.   aspirin 81 MG EC tablet Take 1 Tablet by mouth every day. Yes Taking JERRICA SpearsPRudiRCOLETTE   pioglitazone (ACTOS) 45 MG Tab TAKE ONE TABLET BY MOUTH ONCE DAILY Yes Taking Ganesh Benton III, M.D.   linagliptin (TRADJENTA) 5 MG Tab tablet Take 1 Tablet by mouth every day. Yes Taking Ganesh Benton III, M.D.   glyBURIDE (DIABETA) 5 MG Tab Take 2 Tablets by mouth 2 times a day with meals. Yes Taking Ganesh Benton III, M.D.   predniSONE (DELTASONE) 5 MG Tab Take 1 Tablet by mouth every day. Yes Taking Rich Bolton M.D.        PMH:  Past Medical History:   Diagnosis Date    A-fib (HCC)     Arrhythmia     Benign essential hypertension     Diabetes  (HCC)     type 2    Heart burn     Hemorrhagic disorder (HCC)     Eliquis    Hyperlipoproteinemia     Hypertension     not on meds anymore    Indigestion     Myocardial infarct (HCC) 2013    stent    Polycystic kidney 09/10/2010    RIGHT KIDNEY TRANSPLANT    Presence of Watchman left atrial appendage closure device 02/29/2024    Sleep apnea 01/30/2024    states he was told he had sleep apnea but has not had a sleep study    Snoring      Past Surgical History:   Procedure Laterality Date    STENT PLACEMENT  2013    cardiac    KNEE MANIPULATION  02/16/2012    Performed by LATOYA CONNER at SURGERY [facility] ORS    KNEE UNICOMPARTMENTAL  12/23/2011    Performed by LATOYA CONNER at SURGERY [facility] ORS    KNEE ARTHROSCOPY  12/23/2011    Performed by LATOYA CONNER at [facility]GERY [facility] ORS    KNEE ARTHROSCOPY  05/03/2011    Performed by HANANE GOLDMAN at SURGERY SAME DAY [facility] ORS    MENISCECTOMY, KNEE, MEDIAL  05/03/2011    Performed by HANANE GOLDMAN at SURGERY SAME DAY [facility] ORS    OTHER  09/10/2010    RIGHT KIDNEY TRANSPLANT    KNEE ARTHROPLASTY TOTAL  01/12/2007    RIGHT    KNEE ARTHROSCOPY  04/10/2006    RIGHT    OTHER ORTHOPEDIC SURGERY  07/08/1974    LEFT KNEE DEBRIDEMENT     No family history on file.  Social History     Socioeconomic History    Marital status: Single     Spouse name: Not on file    Number of children: Not on file    Years of education: Not on file    Highest education level: Not on file   Occupational History    Not on file   Tobacco Use    Smoking status: Never     Passive exposure: Never    Smokeless tobacco: Never   Vaping Use    Vaping Use: Never used   Substance and Sexual Activity    Alcohol use: No    Drug use: No    Sexual activity: Yes     Partners: Female   Other Topics Concern    Not on file   Social History Narrative    Not on file     Social Determinants of Health     Financial Resource Strain: Not on file   Food Insecurity: Not on file   Transportation Needs: Not  "on file   Physical Activity: Not on file   Stress: Not on file   Social Connections: Not on file   Intimate Partner Violence: Not on file   Housing Stability: Not on file           Allergies/Intolerances:  Allergies   Allergen Reactions    Doxycycline Rash     Sweats and shakes: 9/28/17: Clarified allergy with patient. Allergy was in 1998 and he doesn't remember what happened. He thought the medication is for pain.  Tolerates doxycycline 9/2017       ROS:   Review of Systems   Constitutional:  Positive for malaise/fatigue. Negative for chills, fever and weight loss.   HENT:  Negative for congestion and hearing loss.    Eyes:  Negative for blurred vision and double vision.   Respiratory:  Negative for cough, sputum production, shortness of breath and wheezing.    Cardiovascular:  Negative for chest pain and palpitations.   Gastrointestinal:  Positive for nausea. Negative for abdominal pain, constipation, diarrhea and vomiting.   Genitourinary:  Negative for dysuria.   Musculoskeletal:  Negative for myalgias.   Skin:  Negative for itching and rash.   Neurological:  Positive for focal weakness. Negative for dizziness and headaches.   Endo/Heme/Allergies:  Does not bruise/bleed easily.   Psychiatric/Behavioral:  The patient is not nervous/anxious.       ROS was reviewed and were negative except as above.    Objective    Most Recent Vital Signs:  /62 (BP Location: Left arm, Patient Position: Sitting, BP Cuff Size: Adult)   Pulse 80   Temp 36.7 °C (98 °F) (Temporal)   Ht 1.956 m (6' 5\")   Wt 107 kg (235 lb) Comment: checked 3/21  SpO2 95%   BMI 27.87 kg/m²     Physical Exam:  Physical Exam  Vitals reviewed.   Constitutional:       Appearance: Normal appearance.   HENT:      Head: Normocephalic and atraumatic.      Nose: Nose normal.      Mouth/Throat:      Mouth: Mucous membranes are moist.   Eyes:      Pupils: Pupils are equal, round, and reactive to light.   Cardiovascular:      Rate and Rhythm: Normal rate " and regular rhythm.   Pulmonary:      Effort: Pulmonary effort is normal.      Breath sounds: Normal breath sounds.   Abdominal:      Palpations: Abdomen is soft.   Musculoskeletal:         General: No tenderness.      Cervical back: Normal range of motion and neck supple.      Comments: Left axillary carcinoma.  Multiple open wounds.  IR drain with serous output.  Mild surrounding erythema.  See clinical photo attached   Skin:     General: Skin is warm and dry.      Coloration: Skin is not jaundiced.      Findings: No erythema or rash.      Comments: Left arm edema +2    Neurological:      Mental Status: He is alert and oriented to person, place, and time.      Motor: No weakness.   Psychiatric:         Mood and Affect: Mood normal.         Behavior: Behavior normal.          Pertinent Lab/Imaging Results:  [unfilled]  @CMP@  WBC   Date/Time Value Ref Range Status   03/15/2024 01:43 AM 6.6 4.8 - 10.8 K/uL Final   03/15/2024 01:43 AM 6.6 4.0 - 11.0 K/UL Final     RBC   Date/Time Value Ref Range Status   03/15/2024 01:43 AM 4.58 (L) 4.70 - 6.10 M/uL Final     Hemoglobin   Date/Time Value Ref Range Status   03/15/2024 01:43 AM 13.3 (L) 14.0 - 18.0 g/dL Final   03/15/2024 01:43 AM 13.3 (A) 13.5 - 18.0 G/DL Final     Hematocrit   Date/Time Value Ref Range Status   03/15/2024 01:43 AM 39.7 (L) 42.0 - 52.0 % Final   03/15/2024 01:43 AM 39.7 (A) 40.0 - 52.0 % Final     MCV   Date/Time Value Ref Range Status   03/15/2024 01:43 AM 86.7 81.4 - 97.8 fL Final   03/15/2024 01:43 AM 86.7 80.0 - 100.0 FL Final     MCH   Date/Time Value Ref Range Status   03/15/2024 01:43 AM 29.0 27.0 - 33.0 pg Final     MCHC   Date/Time Value Ref Range Status   03/15/2024 01:43 AM 33.5 32.3 - 36.5 g/dL Final     Comment:     Please note new reference range effective 05/22/2023.     MPV   Date/Time Value Ref Range Status   03/15/2024 01:43 AM 11.1 9.0 - 12.9 fL Final      Sodium   Date/Time Value Ref Range Status   03/15/2024 01:43  135 - 145  mmol/L Final     Potassium   Date/Time Value Ref Range Status   03/15/2024 01:43 AM 3.5 (L) 3.6 - 5.5 mmol/L Final     Chloride   Date/Time Value Ref Range Status   03/15/2024 01:43 AM 99 96 - 112 mmol/L Final     Co2   Date/Time Value Ref Range Status   03/15/2024 01:43 AM 29 20 - 33 mmol/L Final     Glucose   Date/Time Value Ref Range Status   03/15/2024 01:43  (H) 65 - 99 mg/dL Final     Bun   Date/Time Value Ref Range Status   03/15/2024 01:43 AM 55 (H) 8 - 22 mg/dL Final     Creatinine   Date/Time Value Ref Range Status   03/15/2024 01:43 AM 2.00 (H) 0.50 - 1.40 mg/dL Final   12/18/2006 06:20 AM 2.4 (H) 0.5 - 1.4 mg/dL Final     Alkaline Phosphatase   Date/Time Value Ref Range Status   03/08/2024 02:30 AM 61 30 - 99 U/L Final     AST(SGOT)   Date/Time Value Ref Range Status   03/08/2024 02:30 AM 27 12 - 45 U/L Final     ALT(SGPT)   Date/Time Value Ref Range Status   03/08/2024 02:30 AM 19 2 - 50 U/L Final     Total Bilirubin   Date/Time Value Ref Range Status   03/08/2024 02:30 AM 0.7 0.1 - 1.5 mg/dL Final      CPK Total   Date/Time Value Ref Range Status   06/08/2022 06:11 AM 35 0 - 154 U/L Final          Blood Culture   Date Value Ref Range Status   09/27/2017 No growth after 5 days of incubation.  Final     Blood Culture Hold   Date Value Ref Range Status   09/27/2017 Collected  Final       Blood Culture   Date Value Ref Range Status   09/27/2017 No growth after 5 days of incubation.  Final     Blood Culture Hold   Date Value Ref Range Status   09/27/2017 Collected  Final          CULTURE WOUND W/ GRAM STAIN  Order: [id number] - Reflex for Order [id number]   Status: Edited Result - FINAL       Visible to patient: Yes (not seen)       Next appt: 03/29/2024 at 03:00 PM in Infectious Diseases (Ovidio Linton A.P.R.N.)    Specimen Information: Wound   0 Result Notes      Component 2 wk ago   Significant Indicator POS Positive (POS)   Source WND   Site L Axillary Fluid   Culture Result - Abnormal    Gram  Stain Result Rare WBCs.  Moderate Gram positive cocci.   Culture Result  Abnormal   Methicillin Resistant Staphylococcus aureus  Heavy growth  This isolate is presumed to be clindamycin resistant based on  detection of inducible resistance.    Culture Result  Abnormal   Enterococcus faecalis  Heavy growth    Resulting Agency M        Susceptibility     Methicillin resistant staphylococcus aureus Enterococcus faecalis     CESIA CESIA     Ampicillin   <=2 mcg/mL Sensitive     Ampicillin/sulbactam <=8/4 mcg/mL Resistant       Cefazolin <=8 mcg/mL Resistant       Cefepime 8 mcg/mL Resistant       Clindamycin 0.5 mcg/mL Resistant       Daptomycin <=0.5 mcg/mL Sensitive 2 mcg/mL Sensitive     Erythromycin >4 mcg/mL Resistant       Gent Synergy   <=500 mcg/mL Sensitive     Linezolid 2 mcg/mL Sensitive 2 mcg/mL Sensitive     Oxacillin >2 mcg/mL Resistant       Penicillin   2 mcg/mL Sensitive     Tetracycline >8 mcg/mL Resistant >8 mcg/mL Resistant     Trimeth/Sulfa <=0.5/9.5 m... Sensitive       Vancomycin 1 mcg/mL Sensitive 1 mcg/mL Sensitive                    Narrative  Performed by: M  Left axillary fluid aspirate  Left axillary fluid aspirate      Specimen Collected: 03/07/24 12:06 PM Last Resulted: 03/10/24  8:46 AM           CULTURE WOUND W/ GRAM STAIN  Order: [id number] - Reflex for Order [id number]   Status: Edited Result - FINAL       Visible to patient: Yes (not seen)       Next appt: 03/29/2024 at 03:00 PM in Infectious Diseases (Ovidio Linton A.P.R.N.)    Specimen Information: Wound   0 Result Notes      Component 2 wk ago   Significant Indicator POS Positive (POS)   Source WND   Site L Axillary Fluid   Culture Result - Abnormal    Gram Stain Result Rare WBCs.  Moderate Gram positive cocci.   Culture Result  Abnormal   Methicillin Resistant Staphylococcus aureus  Heavy growth  This isolate is presumed to be clindamycin resistant based on  detection of inducible resistance.    Culture Result  Abnormal   Enterococcus  faecalis  Heavy growth    Resulting Agency M        Susceptibility     Methicillin resistant staphylococcus aureus Enterococcus faecalis     CESIA CESIA     Ampicillin   <=2 mcg/mL Sensitive     Ampicillin/sulbactam <=8/4 mcg/mL Resistant       Cefazolin <=8 mcg/mL Resistant       Cefepime 8 mcg/mL Resistant       Clindamycin 0.5 mcg/mL Resistant       Daptomycin <=0.5 mcg/mL Sensitive 2 mcg/mL Sensitive     Erythromycin >4 mcg/mL Resistant       Gent Synergy   <=500 mcg/mL Sensitive     Linezolid 2 mcg/mL Sensitive 2 mcg/mL Sensitive     Oxacillin >2 mcg/mL Resistant       Penicillin   2 mcg/mL Sensitive     Tetracycline >8 mcg/mL Resistant >8 mcg/mL Resistant     Trimeth/Sulfa <=0.5/9.5 m... Sensitive       Vancomycin 1 mcg/mL Sensitive 1 mcg/mL Sensitive                    Narrative  Performed by: BENEDICT  Left axillary fluid aspirate  Left axillary fluid aspirate      Specimen Collected: 03/07/24 12:06 PM Last Resulted: 03/10/24  8:46 AM        Lab Flowsheet        Order Details        View Encounter        Lab and Collection Details        Routing        Result History     View All Conversations on this Encounter           Result Care Coordination      Patient Communication     Add Comments   Not seen Back to Top          Contains abnormal data Anaerobic Culture  Order: [id number] - Reflex for Order [id number]   Status: Final result         Visible to patient: Yes (not seen)         Next appt: 03/29/2024 at 03:00 PM in Infectious Diseases (Ovidio Linton A.P.R.N.)      Specimen Information: Wound   0 Result Notes          Component 2 wk ago   Significant Indicator POS Positive (POS)   Source WND   Site L Axillary Fluid   Culture Result - Abnormal    Culture Result  Abnormal   Finegoldia magna  Heavy growth    Resulting Agency M              Narrative  Performed by: BENEDICT  Left axillary fluid aspirate  Left axillary fluid aspirate      Specimen Collected: 03/07/24 12:06 PM Last Resulted: 03/10/24  1:58 PM                Impression/Assessment      1. Polymicrobial bacterial infection  CBC WITH DIFFERENTIAL    Comp Metabolic Panel      2. Abscess of axilla, left        3. Squamous cell carcinoma of skin        4. Immunosuppressed status (HCC)        5. Thrombocytopenia (HCC)        6. Anemia of chronic disease        7. Stage 3b chronic kidney disease (HCC)        8. History of renal transplant          Jama Altman is a 68 y.o. male with hx of afib recent watchman procedure,  CAD, renal transplant 2010 (hx of polycystic kidney disease), HLD, HTN and PAD.Admitted 3/6/2024 for nonhealing wound left axilla. He was recently diagnosed with SCCA after biopsy of left axillary mass  2/23/24. CT chest + partially visualized large left axillary mass and necrotic adenopathy consistent with known SCC.  Patchy groundglass density and subsegmental atelectasis, consistent with chronic inflammatory/infectious process. Drain placed 3/7-cultures of fluid polymicrobial + MRSA (only oral option available is Zyvox), E- faecalis, Fingoldia. Repeat  CT 3/11 : Irregular fluid collection in the LEFT axillary subcutaneous soft tissues measuring 8.3 x 5.1 x 11.1 cm with gas bubbles and drainage catheter in place. IR repositioned and up sized drain on 3/14.  Patient will discharge home on 3/16 with drain in place.  He will follow-up with infectious disease outpatient for drain management and ongoing antibiotics.  Ultimately, patient may require surgical intervention for necrotic tissue that is present.  Patient was discharged home on a 14-day course of p.o. Augmentin 875/125 mg twice daily and p.o. Zyvox 600 mg twice daily.     03/22/24-today the patient reports that he has had having nausea while on both Augmentin and Zyvox.  Due to limited antibiotic options recommend spacing antibiotics by 2 hours and continue taking probiotic to see if he has any relief in symptoms.  He does have as needed Zofran available.  He denies any vomiting, fever,  chills, diarrhea.  Left axillary abscess/tumor site with continued drainage.  Patient is keeping a log of drainage output which is slowing compared to hospital admission.  Continues to have 30 to 50 mL of output a day.  We discussed antibiotic treatment options.  Due to his MRSA and history of renal cell transplant he is limited to only oral Zyvox for antibiotic treatment which can only be used for 2 weeks before causing worsening thrombocytopenia.  Patient already has thrombocytopenia 137.  Augmentin is covering the E faecalis and Feingoldia.  Recommended to finish 14-day course of antibiotic therapy which ends on 3/30/24 as there is no good oral options for long-term suppression.  Due to the patient having a drain in place with high output will hopefully prevent worsening infection.  Recommend ultimately to have surgical intervention as necrotic tissue will become a nidus for infection.  Education provided on signs and symptoms of worsening infection and when to report to ER/call 911.  Repeat lab work CBC/CMP in 1 week for close monitoring.  Previous lab work reviewed and discussed with patient from 3/15/2024, WBC 6.6, stable anemia, thrombocytopenia 133, chronic history of elevated creatinine 2.0 and decreased GFR 36 stable.   PLAN:   -   Due to his MRSA and history of renal cell transplant he is limited to only oral Zyvox for antibiotic treatment which can only be used for 2 weeks before causing worsening thrombocytopenia.  Patient already has thrombocytopenia 137.  Augmentin is covering the E faecalis and Feingoldia.  Recommended to finish 14-day course of antibiotic therapy which ends on 3/30/24 as there is no good oral options for long-term suppression.  Due to the patient having a drain in place with high output will hopefully prevent worsening infection.   -  Recommend  surgical intervention as necrotic tissue will become a nidus for infection.   -  Repeat lab work CBC/CMP in 1 week for close monitoring.      - Medication education provided and S/S of side effects discussed   - Recommend routine follow up with oncology and surgery  - Continue wound care to left arm.  Dressing supplies provided to patient.  Recommended to continue keeping log of drain output  - Continue po probiotic  - Education provided on S/S of infection and when to report to ER/ call 911         Return visit: 1 week. Follow up with primary care physician for chronic medical problems      I have performed a physical exam,  updated ROS and plan today. I have reviewed previous images, labs, and provider notes.      BITA Gandhi.    All Patients should seek medical re-evaluation or report to the ER for new, increasing or worsening symptoms. In some circumstances medical conditions can change from the initial evaluation and may require emergent medical re-evaluation. This includes but is not limited to chest pain, shortness of breath, atypical abdominal pain, atypical headache, ALOC, fever >101, low blood pressure, high respiratory rate (above 30), low oxygen saturation (below 90%), acute delirium, abnormal bleeding, inability to tolerate any intake, weakness on one side of the body, any worsened or concerning conditions.    Please note that this dictation was created using voice recognition software. I have worked with technical experts from [employer] [facility] to optimize the interface.  I have made every reasonable attempt to correct obvious errors, but there may be errors of grammar and possibly content that I did not discover before finalizing the note.

## 2024-03-25 ASSESSMENT — ENCOUNTER SYMPTOMS
ABDOMINAL PAIN: 0
FEVER: 0
WHEEZING: 0
PALPITATIONS: 0
COUGH: 0
DIZZINESS: 0
BRUISES/BLEEDS EASILY: 0
BLURRED VISION: 0
VOMITING: 0
CONSTIPATION: 0
NAUSEA: 1
HEADACHES: 0
NERVOUS/ANXIOUS: 0
DOUBLE VISION: 0
FOCAL WEAKNESS: 1
MYALGIAS: 0
SHORTNESS OF BREATH: 0
CHILLS: 0
DIARRHEA: 0
WEIGHT LOSS: 0
SPUTUM PRODUCTION: 0

## 2024-03-26 NOTE — PROGRESS NOTES
INFECTIOUS  DISEASE  OUTPATIENT CLINIC  NOTE   Subjective   Primary care provider: Ganesh Benton III, M.D..     Reason for Follow Up:   Follow-up for   1. Polymicrobial bacterial infection        2. Abscess of axilla, left        3. Squamous cell carcinoma of skin        4. Immunosuppressed status (HCC)        5. Thrombocytopenia (HCC)        6. Anemia of chronic disease        7. Stage 3b chronic kidney disease (HCC)        8. History of renal transplant          HPI: Patient previously seen and treated by ID team as inpatient during hospital admission.   Jama Altman is a 68 y.o. male with hx of afib recent watchman procedure,  CAD, renal transplant 2010 (hx of polycystic kidney disease), HLD, HTN and PAD.Admitted 3/6/2024 for nonhealing wound left axilla. He was recently diagnosed with SCCA after biopsy of left axillary mass  2/23/24. CT chest + partially visualized large left axillary mass and necrotic adenopathy consistent with known SCC.  Patchy groundglass density and subsegmental atelectasis, consistent with chronic inflammatory/infectious process. Drain placed 3/7-cultures of fluid polymicrobial + MRSA (only oral option available is Zyvox), E- faecalis, Fingoldia. Repeat  CT 3/11 : Irregular fluid collection in the LEFT axillary subcutaneous soft tissues measuring 8.3 x 5.1 x 11.1 cm with gas bubbles and drainage catheter in place. IR repositioned and up sized drain on 3/14.  Patient will discharge home on 3/16 with drain in place.  He will follow-up with infectious disease outpatient for drain management and ongoing antibiotics.  Ultimately, patient may require surgical intervention for necrotic tissue that is present.  Patient was discharged home on a 14-day course of p.o. Augmentin 875/125 mg twice daily and p.o. Zyvox 600 mg twice daily.     03/22/24-today the patient reports that he has had having nausea while on both Augmentin and Zyvox.  Due to limited antibiotic options recommend spacing  antibiotics by 2 hours and continue taking probiotic to see if he has any relief in symptoms.  He does have as needed Zofran available.  He denies any vomiting, fever, chills, diarrhea.  Left axillary abscess/tumor site with continued drainage.  Patient is keeping a log of drainage output which is slowing compared to hospital admission.  Continues to have 30 to 50 mL of output a day.  We discussed antibiotic treatment options.  Due to his MRSA and history of renal cell transplant he is limited to only oral Zyvox for antibiotic treatment which can only be used for 2 weeks before causing worsening thrombocytopenia.  Patient already has thrombocytopenia 137.  Augmentin is covering the E faecalis and Feingoldia.  Recommended to finish 14-day course of antibiotic therapy which ends on 3/30/24 as there is no good oral options for long-term suppression.  Due to the patient having a drain in place with high output will hopefully prevent worsening infection.  Recommend ultimately to have surgical intervention as necrotic tissue will become a nidus for infection.  Education provided on signs and symptoms of worsening infection and when to report to ER/call 911.  Repeat lab work CBC/CMP in 1 week for close monitoring.  Previous lab work reviewed and discussed with patient from 3/15/2024, WBC 6.6, stable anemia, thrombocytopenia 133, chronic history of elevated creatinine 2.0 and decreased GFR 36 stable.       Current Antimicrobials: 14-day course of p.o. Augmentin 875/125 mg twice daily and p.o. Zyvox 600 mg twice daily, end date 3/30/2024  Previous Antimicrobials: Unasyn,    Other Current Medications:  Home Medications    Medication Sig Taking? Last Dose Authorizing Provider   diphenoxylate-atropine (LOMOTIL) 2.5-0.025 MG Tab Take 1 Tablet by mouth 4 times a day as needed for Diarrhea for up to 8 days.   Ganesh Benton III, M.D.   omeprazole (PRILOSEC) 20 MG delayed-release capsule TAKE 1 CAPSULE BY MOUTH EVERY DAY   Ganesh SCHAEFFER  Chetan SIMPSON M.D.   gabapentin (NEURONTIN) 400 MG Cap Take 1 Capsule by mouth 2 times a day.   Spring Romero M.D.   tacrolimus (PROGRAF) 0.5 MG Cap Take 1 Capsule by mouth every evening.  Patient taking differently: Take 0.5 mg by mouth every evening. one in the morning and half at night  Indications: kidney transplant   Spring Romero M.D.   amoxicillin-clavulanate (AUGMENTIN) 875-125 MG Tab Take 1 Tablet by mouth every 12 hours for 30 days.   Spring Romero M.D.   linezolid (ZYVOX) 600 MG Tab Take 1 Tablet by mouth every 12 hours for 14 days.   Spring Romero M.D.   sodium bicarbonate (SODIUM BICARBONATE) 650 MG Tab Take 1 Tablet by mouth 2 (two) times a day.   Spring Romero M.D.   metoprolol SR (TOPROL XL) 25 MG TABLET SR 24 HR Take 1 Tablet by mouth every day. May take additional one tab daily for heart rate sustaining >110 BPM.   GENEVA Cadet.P.RRudiNRudi   atorvastatin (LIPITOR) 80 MG tablet Take 1 Tablet by mouth at bedtime.   Ganesh Benton III, M.D.   tadalafil (CIALIS) 5 MG tablet : TAKE 1 TABLETS 30 MINUTES BEFORE SEXUAL ACTIVITY, DO NOT EXCEED MORE THAN ONE DOSE A DAY   Ganesh Benton III, M.D.   aspirin 81 MG EC tablet Take 1 Tablet by mouth every day.   GENEVA Spears.P.RCOLETTE   pioglitazone (ACTOS) 45 MG Tab TAKE ONE TABLET BY MOUTH ONCE DAILY   Ganesh Benton III, M.D.   linagliptin (TRADJENTA) 5 MG Tab tablet Take 1 Tablet by mouth every day.   Ganesh Benton III, M.D.   glyBURIDE (DIABETA) 5 MG Tab Take 2 Tablets by mouth 2 times a day with meals.   Ganesh Benton III, M.D.   predniSONE (DELTASONE) 5 MG Tab Take 1 Tablet by mouth every day.   Rich Bolton M.D.        [facility]:  Past Medical History:   Diagnosis Date    A-fib (HCC)     Arrhythmia     Benign essential hypertension     Diabetes (HCC)     type 2    Heart burn     Hemorrhagic disorder (HCC)     Eliquis    Hyperlipoproteinemia     Hypertension     not on meds anymore    Indigestion     Myocardial infarct (HCC) 2013    stent     Polycystic kidney 09/10/2010    RIGHT KIDNEY TRANSPLANT    Presence of Watchman left atrial appendage closure device 02/29/2024    Sleep apnea 01/30/2024    states he was told he had sleep apnea but has not had a sleep study    Snoring      Past Surgical History:   Procedure Laterality Date    STENT PLACEMENT  2013    cardiac    KNEE MANIPULATION  02/16/2012    Performed by LATOYA CONNER at SURGERY [facility]S    KNEE UNICOMPARTMENTAL  12/23/2011    Performed by LATOYA CONNER at SURGERY [facility]S    KNEE ARTHROSCOPY  12/23/2011    Performed by LATOYA CONNER at SURGERY [facility]    KNEE ARTHROSCOPY  05/03/2011    Performed by HANANE GOLDMAN at SURGERY SAME DAY [facility]S    MENISCECTOMY, KNEE, MEDIAL  05/03/2011    Performed by HANANE GOLDMAN at SURGERY SAME DAY [facility]    OTHER  09/10/2010    RIGHT KIDNEY TRANSPLANT    KNEE ARTHROPLASTY TOTAL  01/12/2007    RIGHT    KNEE ARTHROSCOPY  04/10/2006    RIGHT    OTHER ORTHOPEDIC SURGERY  07/08/1974    LEFT KNEE DEBRIDEMENT     No family history on file.  Social History     Socioeconomic History    Marital status: Single     Spouse name: Not on file    Number of children: Not on file    Years of education: Not on file    Highest education level: Not on file   Occupational History    Not on file   Tobacco Use    Smoking status: Never     Passive exposure: Never    Smokeless tobacco: Never   Vaping Use    Vaping Use: Never used   Substance and Sexual Activity    Alcohol use: No    Drug use: No    Sexual activity: Yes     Partners: Female   Other Topics Concern    Not on file   Social History Narrative    Not on file     Social Determinants of Health     Financial Resource Strain: Not on file   Food Insecurity: Not on file   Transportation Needs: Not on file   Physical Activity: Not on file   Stress: Not on file   Social Connections: Not on file   Intimate Partner Violence: Not on file   Housing Stability: Not on file            Allergies/Intolerances:  Allergies   Allergen Reactions    Doxycycline Rash     Sweats and shakes: 9/28/17: Clarified allergy with patient. Allergy was in 1998 and he doesn't remember what happened. He thought the medication is for pain.  Tolerates doxycycline 9/2017       ROS:   Review of Systems   Constitutional:  Positive for malaise/fatigue. Negative for chills, fever and weight loss.   HENT:  Negative for congestion and hearing loss.    Eyes:  Negative for blurred vision and double vision.   Respiratory:  Negative for cough, sputum production, shortness of breath and wheezing.    Cardiovascular:  Negative for chest pain and palpitations.   Gastrointestinal:  Positive for nausea. Negative for abdominal pain, constipation, diarrhea and vomiting.   Genitourinary:  Negative for dysuria.   Musculoskeletal:  Negative for myalgias.   Skin:  Negative for itching and rash.   Neurological:  Positive for focal weakness. Negative for dizziness and headaches.   Endo/Heme/Allergies:  Does not bruise/bleed easily.   Psychiatric/Behavioral:  The patient is not nervous/anxious.       ROS was reviewed and were negative except as above.    Objective    Most Recent Vital Signs:  There were no vitals taken for this visit.    Physical Exam:  Physical Exam  Vitals reviewed.   Constitutional:       Appearance: Normal appearance.   HENT:      Head: Normocephalic and atraumatic.      Nose: Nose normal.      Mouth/Throat:      Mouth: Mucous membranes are moist.   Eyes:      Pupils: Pupils are equal, round, and reactive to light.   Cardiovascular:      Rate and Rhythm: Normal rate and regular rhythm.   Pulmonary:      Effort: Pulmonary effort is normal.      Breath sounds: Normal breath sounds.   Abdominal:      Palpations: Abdomen is soft.   Musculoskeletal:         General: No tenderness.      Cervical back: Normal range of motion and neck supple.      Comments: Left axillary carcinoma.  Multiple open wounds.  IR drain with serous  output.  Mild surrounding erythema.  See clinical photo attached   Skin:     General: Skin is warm and dry.      Coloration: Skin is not jaundiced.      Findings: No erythema or rash.      Comments: Left arm edema +2    Neurological:      Mental Status: He is alert and oriented to person, place, and time.      Motor: No weakness.   Psychiatric:         Mood and Affect: Mood normal.         Behavior: Behavior normal.          Pertinent Lab/Imaging Results:  [unfilled]  @CMP@  WBC   Date/Time Value Ref Range Status   03/15/2024 01:43 AM 6.6 4.8 - 10.8 K/uL Final   03/15/2024 01:43 AM 6.6 4.0 - 11.0 K/UL Final     RBC   Date/Time Value Ref Range Status   03/15/2024 01:43 AM 4.58 (L) 4.70 - 6.10 M/uL Final     Hemoglobin   Date/Time Value Ref Range Status   03/15/2024 01:43 AM 13.3 (L) 14.0 - 18.0 g/dL Final   03/15/2024 01:43 AM 13.3 (A) 13.5 - 18.0 G/DL Final     Hematocrit   Date/Time Value Ref Range Status   03/15/2024 01:43 AM 39.7 (L) 42.0 - 52.0 % Final   03/15/2024 01:43 AM 39.7 (A) 40.0 - 52.0 % Final     MCV   Date/Time Value Ref Range Status   03/15/2024 01:43 AM 86.7 81.4 - 97.8 fL Final   03/15/2024 01:43 AM 86.7 80.0 - 100.0 FL Final     MCH   Date/Time Value Ref Range Status   03/15/2024 01:43 AM 29.0 27.0 - 33.0 pg Final     MCHC   Date/Time Value Ref Range Status   03/15/2024 01:43 AM 33.5 32.3 - 36.5 g/dL Final     Comment:     Please note new reference range effective 05/22/2023.     MPV   Date/Time Value Ref Range Status   03/15/2024 01:43 AM 11.1 9.0 - 12.9 fL Final      Sodium   Date/Time Value Ref Range Status   03/15/2024 01:43  135 - 145 mmol/L Final     Potassium   Date/Time Value Ref Range Status   03/15/2024 01:43 AM 3.5 (L) 3.6 - 5.5 mmol/L Final     Chloride   Date/Time Value Ref Range Status   03/15/2024 01:43 AM 99 96 - 112 mmol/L Final     Co2   Date/Time Value Ref Range Status   03/15/2024 01:43 AM 29 20 - 33 mmol/L Final     Glucose   Date/Time Value Ref Range Status   03/15/2024  01:43  (H) 65 - 99 mg/dL Final     Bun   Date/Time Value Ref Range Status   03/15/2024 01:43 AM 55 (H) 8 - 22 mg/dL Final     Creatinine   Date/Time Value Ref Range Status   03/15/2024 01:43 AM 2.00 (H) 0.50 - 1.40 mg/dL Final   12/18/2006 06:20 AM 2.4 (H) 0.5 - 1.4 mg/dL Final     Alkaline Phosphatase   Date/Time Value Ref Range Status   03/08/2024 02:30 AM 61 30 - 99 U/L Final     AST(SGOT)   Date/Time Value Ref Range Status   03/08/2024 02:30 AM 27 12 - 45 U/L Final     ALT(SGPT)   Date/Time Value Ref Range Status   03/08/2024 02:30 AM 19 2 - 50 U/L Final     Total Bilirubin   Date/Time Value Ref Range Status   03/08/2024 02:30 AM 0.7 0.1 - 1.5 mg/dL Final      CPK Total   Date/Time Value Ref Range Status   06/08/2022 06:11 AM 35 0 - 154 U/L Final          Blood Culture   Date Value Ref Range Status   09/27/2017 No growth after 5 days of incubation.  Final     Blood Culture Hold   Date Value Ref Range Status   09/27/2017 Collected  Final       Blood Culture   Date Value Ref Range Status   09/27/2017 No growth after 5 days of incubation.  Final     Blood Culture Hold   Date Value Ref Range Status   09/27/2017 Collected  Final          CULTURE WOUND W/ GRAM STAIN  Order: [id number] - Reflex for Order [id number]   Status: Edited Result - FINAL       Visible to patient: Yes (not seen)       Next appt: 03/29/2024 at 03:00 PM in Infectious Diseases (Ovidio Linton A.P.R.N.)    Specimen Information: Wound   0 Result Notes      Component 2 wk ago   Significant Indicator POS Positive (POS)   Source WND   Site L Axillary Fluid   Culture Result - Abnormal    Gram Stain Result Rare WBCs.  Moderate Gram positive cocci.   Culture Result  Abnormal   Methicillin Resistant Staphylococcus aureus  Heavy growth  This isolate is presumed to be clindamycin resistant based on  detection of inducible resistance.    Culture Result  Abnormal   Enterococcus faecalis  Heavy growth    Resulting Agency M        Susceptibility      Methicillin resistant staphylococcus aureus Enterococcus faecalis     CESIA CESIA     Ampicillin   <=2 mcg/mL Sensitive     Ampicillin/sulbactam <=8/4 mcg/mL Resistant       Cefazolin <=8 mcg/mL Resistant       Cefepime 8 mcg/mL Resistant       Clindamycin 0.5 mcg/mL Resistant       Daptomycin <=0.5 mcg/mL Sensitive 2 mcg/mL Sensitive     Erythromycin >4 mcg/mL Resistant       Gent Synergy   <=500 mcg/mL Sensitive     Linezolid 2 mcg/mL Sensitive 2 mcg/mL Sensitive     Oxacillin >2 mcg/mL Resistant       Penicillin   2 mcg/mL Sensitive     Tetracycline >8 mcg/mL Resistant >8 mcg/mL Resistant     Trimeth/Sulfa <=0.5/9.5 m... Sensitive       Vancomycin 1 mcg/mL Sensitive 1 mcg/mL Sensitive                    Narrative  Performed by: M  Left axillary fluid aspirate  Left axillary fluid aspirate      Specimen Collected: 03/07/24 12:06 PM Last Resulted: 03/10/24  8:46 AM           CULTURE WOUND W/ GRAM STAIN  Order: [id number] - Reflex for Order [id number]   Status: Edited Result - FINAL       Visible to patient: Yes (not seen)       Next appt: 03/29/2024 at 03:00 PM in Infectious Diseases (Ovidio Linton, A.P.R.N.)    Specimen Information: Wound   0 Result Notes      Component 2 wk ago   Significant Indicator POS Positive (POS)   Source WND   Site L Axillary Fluid   Culture Result - Abnormal    Gram Stain Result Rare WBCs.  Moderate Gram positive cocci.   Culture Result  Abnormal   Methicillin Resistant Staphylococcus aureus  Heavy growth  This isolate is presumed to be clindamycin resistant based on  detection of inducible resistance.    Culture Result  Abnormal   Enterococcus faecalis  Heavy growth    Resulting Agency M        Susceptibility     Methicillin resistant staphylococcus aureus Enterococcus faecalis     CESIA CESIA     Ampicillin   <=2 mcg/mL Sensitive     Ampicillin/sulbactam <=8/4 mcg/mL Resistant       Cefazolin <=8 mcg/mL Resistant       Cefepime 8 mcg/mL Resistant       Clindamycin 0.5 mcg/mL Resistant        Daptomycin <=0.5 mcg/mL Sensitive 2 mcg/mL Sensitive     Erythromycin >4 mcg/mL Resistant       Gent Synergy   <=500 mcg/mL Sensitive     Linezolid 2 mcg/mL Sensitive 2 mcg/mL Sensitive     Oxacillin >2 mcg/mL Resistant       Penicillin   2 mcg/mL Sensitive     Tetracycline >8 mcg/mL Resistant >8 mcg/mL Resistant     Trimeth/Sulfa <=0.5/9.5 m... Sensitive       Vancomycin 1 mcg/mL Sensitive 1 mcg/mL Sensitive                    Narrative  Performed by: M  Left axillary fluid aspirate  Left axillary fluid aspirate      Specimen Collected: 03/07/24 12:06 PM Last Resulted: 03/10/24  8:46 AM        Lab Flowsheet        Order Details        View Encounter        Lab and Collection Details        Routing        Result History     View All Conversations on this Encounter           Result Care Coordination      Patient Communication     Add Comments   Not seen Back to Top          Contains abnormal data Anaerobic Culture  Order: [id number] - Reflex for Order [id number]   Status: Final result         Visible to patient: Yes (not seen)         Next appt: 03/29/2024 at 03:00 PM in Infectious Diseases (Ovidio Linton A.P.R.N.)      Specimen Information: Wound   0 Result Notes          Component 2 wk ago   Significant Indicator POS Positive (POS)   Source WND   Site L Axillary Fluid   Culture Result - Abnormal    Culture Result  Abnormal   Finegoldia magna  Heavy growth    Resulting Agency M              Narrative  Performed by: M  Left axillary fluid aspirate  Left axillary fluid aspirate      Specimen Collected: 03/07/24 12:06 PM Last Resulted: 03/10/24  1:58 PM               Impression/Assessment      1. Polymicrobial bacterial infection        2. Abscess of axilla, left        3. Squamous cell carcinoma of skin        4. Immunosuppressed status (HCC)        5. Thrombocytopenia (HCC)        6. Anemia of chronic disease        7. Stage 3b chronic kidney disease (HCC)        8. History of renal transplant            Jama Brown  Agapito is a 68 y.o. male with hx of afib recent watchman procedure,  CAD, renal transplant 2010 (hx of polycystic kidney disease), HLD, HTN and PAD.Admitted 3/6/2024 for nonhealing wound left axilla. He was recently diagnosed with SCCA after biopsy of left axillary mass  2/23/24. CT chest + partially visualized large left axillary mass and necrotic adenopathy consistent with known SCC.  Patchy groundglass density and subsegmental atelectasis, consistent with chronic inflammatory/infectious process. Drain placed 3/7-cultures of fluid polymicrobial + MRSA (only oral option available is Zyvox), E- faecalis, Fingoldia. Repeat  CT 3/11 : Irregular fluid collection in the LEFT axillary subcutaneous soft tissues measuring 8.3 x 5.1 x 11.1 cm with gas bubbles and drainage catheter in place. IR repositioned and up sized drain on 3/14.  Patient will discharge home on 3/16 with drain in place.  He will follow-up with infectious disease outpatient for drain management and ongoing antibiotics.  Ultimately, patient may require surgical intervention for necrotic tissue that is present.  Patient was discharged home on a 14-day course of p.o. Augmentin 875/125 mg twice daily and p.o. Zyvox 600 mg twice daily.     03/22/24-today the patient reports that he has had having nausea while on both Augmentin and Zyvox.  Due to limited antibiotic options recommend spacing antibiotics by 2 hours and continue taking probiotic to see if he has any relief in symptoms.  He does have as needed Zofran available.  He denies any vomiting, fever, chills, diarrhea.  Left axillary abscess/tumor site with continued drainage.  Patient is keeping a log of drainage output which is slowing compared to hospital admission.  Continues to have 30 to 50 mL of output a day.  We discussed antibiotic treatment options.  Due to his MRSA and history of renal cell transplant he is limited to only oral Zyvox for antibiotic treatment which can only be used for 2 weeks  before causing worsening thrombocytopenia.  Patient already has thrombocytopenia 137.  Augmentin is covering the E faecalis and Feingoldia.  Recommended to finish 14-day course of antibiotic therapy which ends on 3/30/24 as there is no good oral options for long-term suppression.  Due to the patient having a drain in place with high output will hopefully prevent worsening infection.  Recommend ultimately to have surgical intervention as necrotic tissue will become a nidus for infection.  Education provided on signs and symptoms of worsening infection and when to report to ER/call 911.  Repeat lab work CBC/CMP in 1 week for close monitoring.  Previous lab work reviewed and discussed with patient from 3/15/2024, WBC 6.6, stable anemia, thrombocytopenia 133, chronic history of elevated creatinine 2.0 and decreased GFR 36 stable.   PLAN:   -   Due to his MRSA and history of renal cell transplant he is limited to only oral Zyvox for antibiotic treatment which can only be used for 2 weeks before causing worsening thrombocytopenia.  Patient already has thrombocytopenia 137.  Augmentin is covering the E faecalis and Feingoldia.  Recommended to finish 14-day course of antibiotic therapy which ends on 3/30/24 as there is no good oral options for long-term suppression.  Due to the patient having a drain in place with high output will hopefully prevent worsening infection.   -  Recommend  surgical intervention as necrotic tissue will become a nidus for infection.   -  Repeat lab work CBC/CMP in 1 week for close monitoring.     - Medication education provided and S/S of side effects discussed   - Recommend routine follow up with oncology and surgery  - Continue wound care to left arm.  Dressing supplies provided to patient.  Recommended to continue keeping log of drain output  - Continue po probiotic  - Education provided on S/S of infection and when to report to ER/ call 911         Return visit: 1 week. Follow up with primary  care physician for chronic medical problems      I have performed a physical exam,  updated ROS and plan today. I have reviewed previous images, labs, and provider notes.      BITA Gandhi.    All Patients should seek medical re-evaluation or report to the ER for new, increasing or worsening symptoms. In some circumstances medical conditions can change from the initial evaluation and may require emergent medical re-evaluation. This includes but is not limited to chest pain, shortness of breath, atypical abdominal pain, atypical headache, ALOC, fever >101, low blood pressure, high respiratory rate (above 30), low oxygen saturation (below 90%), acute delirium, abnormal bleeding, inability to tolerate any intake, weakness on one side of the body, any worsened or concerning conditions.    Please note that this dictation was created using voice recognition software. I have worked with technical experts from [facility]  [employer] to optimize the interface.  I have made every reasonable attempt to correct obvious errors, but there may be errors of grammar and possibly content that I did not discover before finalizing the note.

## 2024-03-27 LAB — EKG IMPRESSION: NORMAL

## 2024-03-28 ENCOUNTER — HOSPITAL ENCOUNTER (OUTPATIENT)
Dept: LAB | Facility: MEDICAL CENTER | Age: 69
End: 2024-03-28
Attending: NURSE PRACTITIONER
Payer: MEDICARE

## 2024-03-28 ENCOUNTER — TELEPHONE (OUTPATIENT)
Dept: INFECTIOUS DISEASES | Facility: MEDICAL CENTER | Age: 69
End: 2024-03-28
Payer: MEDICARE

## 2024-03-28 DIAGNOSIS — A49.9 POLYMICROBIAL BACTERIAL INFECTION: ICD-10-CM

## 2024-03-28 LAB
ALBUMIN SERPL BCP-MCNC: 4.2 G/DL (ref 3.2–4.9)
ALBUMIN/GLOB SERPL: 2 G/DL
ALP SERPL-CCNC: 67 U/L (ref 30–99)
ALT SERPL-CCNC: 10 U/L (ref 2–50)
ANION GAP SERPL CALC-SCNC: 14 MMOL/L (ref 7–16)
AST SERPL-CCNC: 14 U/L (ref 12–45)
BILIRUB SERPL-MCNC: 0.8 MG/DL (ref 0.1–1.5)
BUN SERPL-MCNC: 27 MG/DL (ref 8–22)
CALCIUM ALBUM COR SERPL-MCNC: 9.2 MG/DL (ref 8.5–10.5)
CALCIUM SERPL-MCNC: 9.4 MG/DL (ref 8.5–10.5)
CHLORIDE SERPL-SCNC: 103 MMOL/L (ref 96–112)
CO2 SERPL-SCNC: 19 MMOL/L (ref 20–33)
CREAT SERPL-MCNC: 1.91 MG/DL (ref 0.5–1.4)
GFR SERPLBLD CREATININE-BSD FMLA CKD-EPI: 38 ML/MIN/1.73 M 2
GLOBULIN SER CALC-MCNC: 2.1 G/DL (ref 1.9–3.5)
GLUCOSE SERPL-MCNC: 130 MG/DL (ref 65–99)
POTASSIUM SERPL-SCNC: 4.6 MMOL/L (ref 3.6–5.5)
PROT SERPL-MCNC: 6.3 G/DL (ref 6–8.2)
SODIUM SERPL-SCNC: 136 MMOL/L (ref 135–145)

## 2024-03-28 PROCEDURE — 36415 COLL VENOUS BLD VENIPUNCTURE: CPT

## 2024-03-28 PROCEDURE — 80053 COMPREHEN METABOLIC PANEL: CPT

## 2024-03-29 ENCOUNTER — APPOINTMENT (OUTPATIENT)
Dept: INFECTIOUS DISEASES | Facility: MEDICAL CENTER | Age: 69
End: 2024-03-29
Attending: NURSE PRACTITIONER
Payer: MEDICARE

## 2024-03-29 ENCOUNTER — HOSPITAL ENCOUNTER (OUTPATIENT)
Facility: MEDICAL CENTER | Age: 69
End: 2024-03-29
Attending: NURSE PRACTITIONER
Payer: MEDICARE

## 2024-03-29 DIAGNOSIS — D84.9 IMMUNOSUPPRESSED STATUS (HCC): ICD-10-CM

## 2024-03-29 DIAGNOSIS — L02.412 ABSCESS OF AXILLA, LEFT: ICD-10-CM

## 2024-03-29 DIAGNOSIS — A49.9 POLYMICROBIAL BACTERIAL INFECTION: ICD-10-CM

## 2024-03-29 DIAGNOSIS — D63.8 ANEMIA OF CHRONIC DISEASE: ICD-10-CM

## 2024-03-29 DIAGNOSIS — N18.32 STAGE 3B CHRONIC KIDNEY DISEASE: ICD-10-CM

## 2024-03-29 DIAGNOSIS — C44.92 SQUAMOUS CELL CARCINOMA OF SKIN: ICD-10-CM

## 2024-03-29 DIAGNOSIS — Z94.0 HISTORY OF RENAL TRANSPLANT: ICD-10-CM

## 2024-03-29 DIAGNOSIS — D69.6 THROMBOCYTOPENIA (HCC): ICD-10-CM

## 2024-03-29 LAB
BASOPHILS # BLD AUTO: 0.5 % (ref 0–1.8)
BASOPHILS # BLD: 0.04 K/UL (ref 0–0.12)
EOSINOPHIL # BLD AUTO: 0.06 K/UL (ref 0–0.51)
EOSINOPHIL NFR BLD: 0.8 % (ref 0–6.9)
ERYTHROCYTE [DISTWIDTH] IN BLOOD BY AUTOMATED COUNT: 46.5 FL (ref 35.9–50)
HCT VFR BLD AUTO: 38.6 % (ref 42–52)
HGB BLD-MCNC: 11.9 G/DL (ref 14–18)
IMM GRANULOCYTES # BLD AUTO: 0.04 K/UL (ref 0–0.11)
IMM GRANULOCYTES NFR BLD AUTO: 0.5 % (ref 0–0.9)
LYMPHOCYTES # BLD AUTO: 0.42 K/UL (ref 1–4.8)
LYMPHOCYTES NFR BLD: 5.5 % (ref 22–41)
MCH RBC QN AUTO: 28.5 PG (ref 27–33)
MCHC RBC AUTO-ENTMCNC: 30.8 G/DL (ref 32.3–36.5)
MCV RBC AUTO: 92.3 FL (ref 81.4–97.8)
MONOCYTES # BLD AUTO: 0.43 K/UL (ref 0–0.85)
MONOCYTES NFR BLD AUTO: 5.6 % (ref 0–13.4)
NEUTROPHILS # BLD AUTO: 6.7 K/UL (ref 1.82–7.42)
NEUTROPHILS NFR BLD: 87.1 % (ref 44–72)
NRBC # BLD AUTO: 0.02 K/UL
NRBC BLD-RTO: 0.3 /100 WBC (ref 0–0.2)
PLATELET # BLD AUTO: 48 K/UL (ref 164–446)
PLATELETS.RETICULATED NFR BLD AUTO: 13.5 % (ref 0.6–13.1)
PMV BLD AUTO: 13.9 FL (ref 9–12.9)
RBC # BLD AUTO: 4.18 M/UL (ref 4.7–6.1)
WBC # BLD AUTO: 7.7 K/UL (ref 4.8–10.8)

## 2024-03-29 PROCEDURE — 36415 COLL VENOUS BLD VENIPUNCTURE: CPT

## 2024-03-29 PROCEDURE — 85055 RETICULATED PLATELET ASSAY: CPT

## 2024-03-29 PROCEDURE — 85025 COMPLETE CBC W/AUTO DIFF WBC: CPT

## 2024-03-29 NOTE — TELEPHONE ENCOUNTER
left message for patient provider out of clinic asked patient to call office to get appt rescheduled

## 2024-03-30 DIAGNOSIS — D69.6 THROMBOCYTOPENIA (HCC): ICD-10-CM

## 2024-03-30 NOTE — PROGRESS NOTES
Recent labs from 3/29 reviewed.  Platelet decreased to 48 (from 133 on 3/15).  Patient is currently on linezolid for axillary abscess.  Called and spoke to patient today.  Instructed patient to discontinue linezolid.  Today was his last day of linezolid anyway.  He will continue Augmentin as previously instructed.  Patient will follow-up in the ID clinic on 4/2.  I have placed new labs for patient to obtain prior to follow-up in the ID clinic.  Patient verbalizes understanding of plan.

## 2024-04-01 ASSESSMENT — ENCOUNTER SYMPTOMS
DIARRHEA: 0
CHILLS: 0
WEIGHT LOSS: 0
FEVER: 0
SPUTUM PRODUCTION: 0
SHORTNESS OF BREATH: 0
FOCAL WEAKNESS: 1
DIZZINESS: 0
NERVOUS/ANXIOUS: 0
BRUISES/BLEEDS EASILY: 0
BLURRED VISION: 0
WHEEZING: 0
PALPITATIONS: 0
VOMITING: 0
ABDOMINAL PAIN: 0
DOUBLE VISION: 0
HEADACHES: 0
COUGH: 0
NAUSEA: 1
MYALGIAS: 0
CONSTIPATION: 0

## 2024-04-01 NOTE — TELEPHONE ENCOUNTER
Received request via: Pharmacy    Was the patient seen in the last year in this department? Yes    Does the patient have an active prescription (recently filled or refills available) for medication(s) requested? No    Pharmacy Name: [id number] - [place], [place] - [address] AT [facility] [address]     Does the patient have long term Plus and need 100 day supply (blood pressure, diabetes and cholesterol meds only)? Yes, quantity updated to 100 days

## 2024-04-02 ENCOUNTER — OFFICE VISIT (OUTPATIENT)
Dept: INFECTIOUS DISEASES | Facility: MEDICAL CENTER | Age: 69
End: 2024-04-02
Attending: NURSE PRACTITIONER
Payer: MEDICARE

## 2024-04-02 ENCOUNTER — HOSPITAL ENCOUNTER (OUTPATIENT)
Dept: LAB | Facility: MEDICAL CENTER | Age: 69
End: 2024-04-02
Attending: INTERNAL MEDICINE
Payer: MEDICARE

## 2024-04-02 VITALS
OXYGEN SATURATION: 95 % | HEIGHT: 77 IN | SYSTOLIC BLOOD PRESSURE: 94 MMHG | HEART RATE: 77 BPM | DIASTOLIC BLOOD PRESSURE: 60 MMHG | TEMPERATURE: 98.6 F | WEIGHT: 235 LBS | RESPIRATION RATE: 16 BRPM | BODY MASS INDEX: 27.75 KG/M2

## 2024-04-02 DIAGNOSIS — D69.6 THROMBOCYTOPENIA (HCC): ICD-10-CM

## 2024-04-02 DIAGNOSIS — N18.32 STAGE 3B CHRONIC KIDNEY DISEASE: ICD-10-CM

## 2024-04-02 DIAGNOSIS — D84.9 IMMUNOSUPPRESSED STATUS (HCC): ICD-10-CM

## 2024-04-02 DIAGNOSIS — M25.59 PAIN IN OTHER SPECIFIED JOINT: ICD-10-CM

## 2024-04-02 DIAGNOSIS — Z94.0 HISTORY OF RENAL TRANSPLANT: ICD-10-CM

## 2024-04-02 DIAGNOSIS — C44.92 SQUAMOUS CELL CARCINOMA OF SKIN: ICD-10-CM

## 2024-04-02 DIAGNOSIS — A49.9 POLYMICROBIAL BACTERIAL INFECTION: ICD-10-CM

## 2024-04-02 DIAGNOSIS — L02.412 ABSCESS OF AXILLA, LEFT: ICD-10-CM

## 2024-04-02 DIAGNOSIS — D63.8 ANEMIA OF CHRONIC DISEASE: ICD-10-CM

## 2024-04-02 DIAGNOSIS — Z01.818 PREOP TESTING: ICD-10-CM

## 2024-04-02 LAB
BASOPHILS # BLD AUTO: 0.3 % (ref 0–1.8)
BASOPHILS # BLD: 0.02 K/UL (ref 0–0.12)
EOSINOPHIL # BLD AUTO: 0.1 K/UL (ref 0–0.51)
EOSINOPHIL NFR BLD: 1.6 % (ref 0–6.9)
ERYTHROCYTE [DISTWIDTH] IN BLOOD BY AUTOMATED COUNT: 45.1 FL (ref 35.9–50)
HCT VFR BLD AUTO: 34.6 % (ref 42–52)
HGB BLD-MCNC: 10.9 G/DL (ref 14–18)
IMM GRANULOCYTES # BLD AUTO: 0.04 K/UL (ref 0–0.11)
IMM GRANULOCYTES NFR BLD AUTO: 0.6 % (ref 0–0.9)
LYMPHOCYTES # BLD AUTO: 0.45 K/UL (ref 1–4.8)
LYMPHOCYTES NFR BLD: 7.1 % (ref 22–41)
MCH RBC QN AUTO: 28.4 PG (ref 27–33)
MCHC RBC AUTO-ENTMCNC: 31.5 G/DL (ref 32.3–36.5)
MCV RBC AUTO: 90.1 FL (ref 81.4–97.8)
MONOCYTES # BLD AUTO: 0.58 K/UL (ref 0–0.85)
MONOCYTES NFR BLD AUTO: 9.2 % (ref 0–13.4)
NEUTROPHILS # BLD AUTO: 5.13 K/UL (ref 1.82–7.42)
NEUTROPHILS NFR BLD: 81.2 % (ref 44–72)
NRBC # BLD AUTO: 0 K/UL
NRBC BLD-RTO: 0 /100 WBC (ref 0–0.2)
PLATELET # BLD AUTO: 48 K/UL (ref 164–446)
PLATELETS.RETICULATED NFR BLD AUTO: 11.6 % (ref 0.6–13.1)
RBC # BLD AUTO: 3.84 M/UL (ref 4.7–6.1)
WBC # BLD AUTO: 6.3 K/UL (ref 4.8–10.8)

## 2024-04-02 PROCEDURE — 85055 RETICULATED PLATELET ASSAY: CPT

## 2024-04-02 PROCEDURE — 36415 COLL VENOUS BLD VENIPUNCTURE: CPT

## 2024-04-02 PROCEDURE — 99214 OFFICE O/P EST MOD 30 MIN: CPT | Performed by: NURSE PRACTITIONER

## 2024-04-02 PROCEDURE — 85025 COMPLETE CBC W/AUTO DIFF WBC: CPT

## 2024-04-02 PROCEDURE — 3074F SYST BP LT 130 MM HG: CPT | Performed by: NURSE PRACTITIONER

## 2024-04-02 PROCEDURE — 3078F DIAST BP <80 MM HG: CPT | Performed by: NURSE PRACTITIONER

## 2024-04-02 RX ORDER — GLYBURIDE 5 MG/1
10 TABLET ORAL 2 TIMES DAILY WITH MEALS
Qty: 400 TABLET | Refills: 3 | Status: SHIPPED | OUTPATIENT
Start: 2024-04-02

## 2024-04-02 ASSESSMENT — FIBROSIS 4 INDEX: FIB4 SCORE: 6.18

## 2024-04-02 NOTE — PROGRESS NOTES
INFECTIOUS  DISEASE  OUTPATIENT CLINIC  NOTE   Subjective   Primary care provider: Ganesh Benton III, M.D..     Reason for Follow Up:   Follow-up for   1. Thrombocytopenia (HCC)        2. Polymicrobial bacterial infection        3. Abscess of axilla, left  CBC WITH DIFFERENTIAL    Comp Metabolic Panel    IR-PICC LINE PLACEMENT W/ GUIDANCE > AGE 6      4. Squamous cell carcinoma of skin        5. Immunosuppressed status (HCC)        6. Anemia of chronic disease        7. Stage 3b chronic kidney disease        8. History of renal transplant        9. Preop testing  Prothrombin Time      10. Pain in other specified joint  Prothrombin Time          HPI: Patient previously seen and treated by ID team as inpatient during hospital admission.   Jama Altman is a 68 y.o. male with hx of afib recent watchman procedure,  CAD, renal transplant 2010 (hx of polycystic kidney disease), HLD, HTN and PAD.Admitted 3/6/2024 for nonhealing wound left axilla. He was recently diagnosed with SCCA after biopsy of left axillary mass  2/23/24. CT chest + partially visualized large left axillary mass and necrotic adenopathy consistent with known SCC.  Patchy groundglass density and subsegmental atelectasis, consistent with chronic inflammatory/infectious process. Drain placed 3/7-cultures of fluid polymicrobial + MRSA (only oral option available is Zyvox), E- faecalis, Fingoldia. Repeat  CT 3/11 : Irregular fluid collection in the LEFT axillary subcutaneous soft tissues measuring 8.3 x 5.1 x 11.1 cm with gas bubbles and drainage catheter in place. IR repositioned and up sized drain on 3/14.  Patient will discharge home on 3/16 with drain in place.  He will follow-up with infectious disease outpatient for drain management and ongoing antibiotics.  Ultimately, patient may require surgical intervention for necrotic tissue that is present.  Patient was discharged home on a 14-day course of p.o. Augmentin 875/125 mg twice daily and p.o. Zyvox  600 mg twice daily.     03/22/24-today the patient reports that he has had having nausea while on both Augmentin and Zyvox.  Due to limited antibiotic options recommend spacing antibiotics by 2 hours and continue taking probiotic to see if he has any relief in symptoms.  He does have as needed Zofran available.  He denies any vomiting, fever, chills, diarrhea.  Left axillary abscess/tumor site with continued drainage.  Patient is keeping a log of drainage output which is slowing compared to hospital admission.  Continues to have 30 to 50 mL of output a day.  We discussed antibiotic treatment options.  Due to his MRSA and history of renal cell transplant he is limited to only oral Zyvox for antibiotic treatment which can only be used for 2 weeks before causing worsening thrombocytopenia.  Patient already has thrombocytopenia 137.  Augmentin is covering the E faecalis and Feingoldia.  Recommended to finish 14-day course of antibiotic therapy which ends on 3/30/24 as there is no good oral options for long-term suppression.  Due to the patient having a drain in place with high output will hopefully prevent worsening infection.  Recommend ultimately to have surgical intervention as necrotic tissue will become a nidus for infection.  Education provided on signs and symptoms of worsening infection and when to report to ER/call 911.  Repeat lab work CBC/CMP in 1 week for close monitoring.  Previous lab work reviewed and discussed with patient from 3/15/2024, WBC 6.6, stable anemia, thrombocytopenia 133, chronic history of elevated creatinine 2.0 and decreased GFR 36 stable.     4/2/24-patient following up after completion of Augmentin and Zyvox. He denies any vomiting, fever, chills, diarrhea. Most recent labs from 3/29/2024, WBC 7.7, anemia worsening 11.9/38.6, thrombocytopenia 48.  Patient was stopped on Zyvox as he completed antibiotic therapy that day.  CMP with creatinine continuing to improve 1.91and GFR slowly  improving 38. Pending repeat labs CMP for today. Drain continues to have 20-50 ml of serosanguinous outpatient a day. Pending start date for immunotherapy. 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 Discussed case with ID MD Mccoy. No other oral options available for MRSA treatment. Will continue  with PO Augmentin 875/125 mg 1 tab twice daily as well for coverage of E- faecalis, Fingoldia. Orders placed fr CBC/CMP/INR, PICC placement and home infusions.  After daptomycin started will stop Augmentin as Daptomycin will cover E- faecalis and MRSA then start PO Flagyl 500 mg 1 tab twice daily     Current Antimicrobials: Augmentin 875/125 mg 1 tab twice daily, IV daptomycin 6 mg/kg once daily indefinitely until CT scan shows resolution of abscess or surgical intervention  is performed     Previous Antimicrobials: Unasyn, Augmentin, Zyvox    Other Current Medications:  Home Medications    Medication Sig Taking? Last Dose Authorizing Provider   glyBURIDE (DIABETA) 5 MG Tab TAKE 2 TABLETS BY MOUTH TWICE DAILY WITH MEALS Yes Taking Ganesh Benton III, M.D.   omeprazole (PRILOSEC) 20 MG delayed-release capsule TAKE 1 CAPSULE BY MOUTH EVERY DAY Yes Taking Ganesh Benton III, M.D.   gabapentin (NEURONTIN) 400 MG Cap Take 1 Capsule by mouth 2 times a day. Yes Taking Spring Romero M.D.   tacrolimus (PROGRAF) 0.5 MG Cap Take 1 Capsule by mouth every evening.  Patient taking differently: Take 0.5 mg by mouth every evening. one in the morning and half at night  Indications: kidney transplant Yes Taking Spring Romero M.D.   amoxicillin-clavulanate (AUGMENTIN) 875-125 MG Tab Take 1 Tablet by mouth every 12 hours for 30 days. Yes Taking Spring Romero M.D.    sodium bicarbonate (SODIUM BICARBONATE) 650 MG Tab Take 1 Tablet by mouth 2 (two) times a day. Yes Taking Spring Romero M.D.   metoprolol SR (TOPROL XL) 25 MG TABLET SR 24 HR Take 1 Tablet by mouth every day. May take additional one tab daily for heart rate sustaining >110 BPM. Yes Taking SHARON Cadet   atorvastatin (LIPITOR) 80 MG tablet Take 1 Tablet by mouth at bedtime. Yes Taking Ganesh Benton III, M.D.   tadalafil (CIALIS) 5 MG tablet : TAKE 1 TABLETS 30 MINUTES BEFORE SEXUAL ACTIVITY, DO NOT EXCEED MORE THAN ONE DOSE A DAY Yes Taking Ganesh Benton III, M.D.   aspirin 81 MG EC tablet Take 1 Tablet by mouth every day. Yes Taking SHARON Spears   pioglitazone (ACTOS) 45 MG Tab TAKE ONE TABLET BY MOUTH ONCE DAILY Yes Taking Ganesh Benton III, M.D.   linagliptin (TRADJENTA) 5 MG Tab tablet Take 1 Tablet by mouth every day. Yes Taking Ganesh Benton III, M.D.   predniSONE (DELTASONE) 5 MG Tab Take 1 Tablet by mouth every day. Yes Taking Rich Bolton M.D.        PMH:  Past Medical History:   Diagnosis Date    A-fib (HCC)     Arrhythmia     Benign essential hypertension     Diabetes (HCC)     type 2    Heart burn     Hemorrhagic disorder (HCC)     Eliquis    Hyperlipoproteinemia     Hypertension     not on meds anymore    Indigestion     Myocardial infarct (HCC) 2013    stent    Polycystic kidney 09/10/2010    RIGHT KIDNEY TRANSPLANT    Presence of Watchman left atrial appendage closure device 02/29/2024    Sleep apnea 01/30/2024    states he was told he had sleep apnea but has not had a sleep study    Snoring      Past Surgical History:   Procedure Laterality Date    STENT PLACEMENT  2013    cardiac    KNEE MANIPULATION  02/16/2012    Performed by LATOYA CONNER at SURGERY [facility]    KNEE UNICOMPARTMENTAL  12/23/2011    Performed by LATOYA CONNER at SURGERY [facility]    KNEE ARTHROSCOPY  12/23/2011    Performed by LATOYA CONNER at SURGERY [facility]    KNEE  ARTHROSCOPY  05/03/2011    Performed by HANANE GOLDMAN at SURGERY SAME DAY [facility]S    MENISCECTOMY, KNEE, MEDIAL  05/03/2011    Performed by HANANE GOLDMAN at SURGERY SAME DAY [facility] ORS    OTHER  09/10/2010    RIGHT KIDNEY TRANSPLANT    KNEE ARTHROPLASTY TOTAL  01/12/2007    RIGHT    KNEE ARTHROSCOPY  04/10/2006    RIGHT    OTHER ORTHOPEDIC SURGERY  07/08/1974    LEFT KNEE DEBRIDEMENT     No family history on file.  Social History     Socioeconomic History    Marital status: Single     Spouse name: Not on file    Number of children: Not on file    Years of education: Not on file    Highest education level: Not on file   Occupational History    Not on file   Tobacco Use    Smoking status: Never     Passive exposure: Never    Smokeless tobacco: Never   Vaping Use    Vaping Use: Never used   Substance and Sexual Activity    Alcohol use: No    Drug use: No    Sexual activity: Yes     Partners: Female   Other Topics Concern    Not on file   Social History Narrative    Not on file     Social Determinants of Health     Financial Resource Strain: Not on file   Food Insecurity: Not on file   Transportation Needs: Not on file   Physical Activity: Not on file   Stress: Not on file   Social Connections: Not on file   Intimate Partner Violence: Not on file   Housing Stability: Not on file           Allergies/Intolerances:  Allergies   Allergen Reactions    Doxycycline Rash     Sweats and shakes: 9/28/17: Clarified allergy with patient. Allergy was in 1998 and he doesn't remember what happened. He thought the medication is for pain.  Tolerates doxycycline 9/2017       ROS:   Review of Systems   Constitutional:  Positive for malaise/fatigue. Negative for chills, fever and weight loss.   HENT:  Negative for congestion and hearing loss.    Eyes:  Negative for blurred vision and double vision.   Respiratory:  Negative for cough, sputum production, shortness of breath and wheezing.    Cardiovascular:  Negative for chest pain  "and palpitations.   Gastrointestinal:  Positive for nausea. Negative for abdominal pain, constipation, diarrhea and vomiting.   Genitourinary:  Negative for dysuria.   Musculoskeletal:  Negative for myalgias.   Skin:  Negative for itching and rash.   Neurological:  Positive for focal weakness. Negative for dizziness and headaches.   Endo/Heme/Allergies:  Does not bruise/bleed easily.   Psychiatric/Behavioral:  The patient is not nervous/anxious.       ROS was reviewed and were negative except as above.    Objective    Most Recent Vital Signs:  BP 94/60 (BP Location: Left arm, Patient Position: Sitting, BP Cuff Size: Adult)   Pulse 77   Temp 37 °C (98.6 °F) (Temporal)   Resp 16   Ht 1.956 m (6' 5\")   Wt 107 kg (235 lb)   SpO2 95%   BMI 27.87 kg/m²     Physical Exam:  Physical Exam  Vitals reviewed.   Constitutional:       Appearance: Normal appearance.   HENT:      Head: Normocephalic and atraumatic.      Nose: Nose normal.      Mouth/Throat:      Mouth: Mucous membranes are moist.   Eyes:      Pupils: Pupils are equal, round, and reactive to light.   Cardiovascular:      Rate and Rhythm: Normal rate and regular rhythm.   Pulmonary:      Effort: Pulmonary effort is normal.      Breath sounds: Normal breath sounds.   Abdominal:      Palpations: Abdomen is soft.   Musculoskeletal:         General: No tenderness.      Cervical back: Normal range of motion and neck supple.      Comments: Left axillary carcinoma.  Multiple open wounds.  IR drain with serous output.  Mild surrounding erythema.  See clinical photo attached   Skin:     General: Skin is warm and dry.      Coloration: Skin is not jaundiced.      Findings: No erythema or rash.      Comments: Left arm edema +2    Neurological:      Mental Status: He is alert and oriented to person, place, and time.      Motor: No weakness.   Psychiatric:         Mood and Affect: Mood normal.         Behavior: Behavior normal.          Pertinent Lab/Imaging " Results:  [unfilled]  @CMP@  WBC   Date/Time Value Ref Range Status   03/29/2024 12:50 PM 7.7 4.8 - 10.8 K/uL Final     RBC   Date/Time Value Ref Range Status   03/29/2024 12:50 PM 4.18 (L) 4.70 - 6.10 M/uL Final     Hemoglobin   Date/Time Value Ref Range Status   03/29/2024 12:50 PM 11.9 (L) 14.0 - 18.0 g/dL Final     Hematocrit   Date/Time Value Ref Range Status   03/29/2024 12:50 PM 38.6 (L) 42.0 - 52.0 % Final     MCV   Date/Time Value Ref Range Status   03/29/2024 12:50 PM 92.3 81.4 - 97.8 fL Final     MCH   Date/Time Value Ref Range Status   03/29/2024 12:50 PM 28.5 27.0 - 33.0 pg Final     MCHC   Date/Time Value Ref Range Status   03/29/2024 12:50 PM 30.8 (L) 32.3 - 36.5 g/dL Final     Comment:     Please note new reference range effective 05/22/2023.     MPV   Date/Time Value Ref Range Status   03/29/2024 12:50 PM 13.9 (H) 9.0 - 12.9 fL Final      Sodium   Date/Time Value Ref Range Status   03/28/2024 12:45  135 - 145 mmol/L Final     Potassium   Date/Time Value Ref Range Status   03/28/2024 12:45 PM 4.6 3.6 - 5.5 mmol/L Final     Chloride   Date/Time Value Ref Range Status   03/28/2024 12:45  96 - 112 mmol/L Final     Co2   Date/Time Value Ref Range Status   03/28/2024 12:45 PM 19 (L) 20 - 33 mmol/L Final     Glucose   Date/Time Value Ref Range Status   03/28/2024 12:45  (H) 65 - 99 mg/dL Final     Bun   Date/Time Value Ref Range Status   03/28/2024 12:45 PM 27 (H) 8 - 22 mg/dL Final     Creatinine   Date/Time Value Ref Range Status   03/28/2024 12:45 PM 1.91 (H) 0.50 - 1.40 mg/dL Final   12/18/2006 06:20 AM 2.4 (H) 0.5 - 1.4 mg/dL Final     Alkaline Phosphatase   Date/Time Value Ref Range Status   03/28/2024 12:45 PM 67 30 - 99 U/L Final     AST(SGOT)   Date/Time Value Ref Range Status   03/28/2024 12:45 PM 14 12 - 45 U/L Final     ALT(SGPT)   Date/Time Value Ref Range Status   03/28/2024 12:45 PM 10 2 - 50 U/L Final     Total Bilirubin   Date/Time Value Ref Range Status   03/28/2024 12:45  PM 0.8 0.1 - 1.5 mg/dL Final      CPK Total   Date/Time Value Ref Range Status   06/08/2022 06:11 AM 35 0 - 154 U/L Final          Blood Culture   Date Value Ref Range Status   09/27/2017 No growth after 5 days of incubation.  Final     Blood Culture Hold   Date Value Ref Range Status   09/27/2017 Collected  Final       Blood Culture   Date Value Ref Range Status   09/27/2017 No growth after 5 days of incubation.  Final     Blood Culture Hold   Date Value Ref Range Status   09/27/2017 Collected  Final          CULTURE WOUND W/ GRAM STAIN  Order: [id number] - Reflex for Order [id number]   Status: Edited Result - FINAL       Visible to patient: Yes (not seen)       Next appt: 03/29/2024 at 03:00 PM in Infectious Diseases (Ovidio Linton A.P.R.N.)    Specimen Information: Wound   0 Result Notes      Component 2 wk ago   Significant Indicator POS Positive (POS)   Source WND   Site L Axillary Fluid   Culture Result - Abnormal    Gram Stain Result Rare WBCs.  Moderate Gram positive cocci.   Culture Result  Abnormal   Methicillin Resistant Staphylococcus aureus  Heavy growth  This isolate is presumed to be clindamycin resistant based on  detection of inducible resistance.    Culture Result  Abnormal   Enterococcus faecalis  Heavy growth    Resulting Agency M        Susceptibility     Methicillin resistant staphylococcus aureus Enterococcus faecalis     CESIA CESIA     Ampicillin   <=2 mcg/mL Sensitive     Ampicillin/sulbactam <=8/4 mcg/mL Resistant       Cefazolin <=8 mcg/mL Resistant       Cefepime 8 mcg/mL Resistant       Clindamycin 0.5 mcg/mL Resistant       Daptomycin <=0.5 mcg/mL Sensitive 2 mcg/mL Sensitive     Erythromycin >4 mcg/mL Resistant       Gent Synergy   <=500 mcg/mL Sensitive     Linezolid 2 mcg/mL Sensitive 2 mcg/mL Sensitive     Oxacillin >2 mcg/mL Resistant       Penicillin   2 mcg/mL Sensitive     Tetracycline >8 mcg/mL Resistant >8 mcg/mL Resistant     Trimeth/Sulfa <=0.5/9.5 m... Sensitive        Vancomycin 1 mcg/mL Sensitive 1 mcg/mL Sensitive                    Narrative  Performed by: BENEDICT  Left axillary fluid aspirate  Left axillary fluid aspirate      Specimen Collected: 03/07/24 12:06 PM Last Resulted: 03/10/24  8:46 AM           CULTURE WOUND W/ GRAM STAIN  Order: [id number] - Reflex for Order [id number]   Status: Edited Result - FINAL       Visible to patient: Yes (not seen)       Next appt: 03/29/2024 at 03:00 PM in Infectious Diseases (Ovidio Linton A.P.R.N.)    Specimen Information: Wound   0 Result Notes      Component 2 wk ago   Significant Indicator POS Positive (POS)   Source WND   Site L Axillary Fluid   Culture Result - Abnormal    Gram Stain Result Rare WBCs.  Moderate Gram positive cocci.   Culture Result  Abnormal   Methicillin Resistant Staphylococcus aureus  Heavy growth  This isolate is presumed to be clindamycin resistant based on  detection of inducible resistance.    Culture Result  Abnormal   Enterococcus faecalis  Heavy growth    Resulting Agency [facility]        Susceptibility     Methicillin resistant staphylococcus aureus Enterococcus faecalis     CESIA CESIA     Ampicillin   <=2 mcg/mL Sensitive     Ampicillin/sulbactam <=8/4 mcg/mL Resistant       Cefazolin <=8 mcg/mL Resistant       Cefepime 8 mcg/mL Resistant       Clindamycin 0.5 mcg/mL Resistant       Daptomycin <=0.5 mcg/mL Sensitive 2 mcg/mL Sensitive     Erythromycin >4 mcg/mL Resistant       Gent Synergy   <=500 mcg/mL Sensitive     Linezolid 2 mcg/mL Sensitive 2 mcg/mL Sensitive     Oxacillin >2 mcg/mL Resistant       Penicillin   2 mcg/mL Sensitive     Tetracycline >8 mcg/mL Resistant >8 mcg/mL Resistant     Trimeth/Sulfa <=0.5/9.5 m... Sensitive       Vancomycin 1 mcg/mL Sensitive 1 mcg/mL Sensitive                    Narrative  Performed by: BENEDICT  Left axillary fluid aspirate  Left axillary fluid aspirate      Specimen Collected: 03/07/24 12:06 PM Last Resulted: 03/10/24  8:46 AM        Lab Flowsheet        Order Details        View  Encounter        Lab and Collection Details        Routing        Result History     View All Conversations on this Encounter           Result Care Coordination      Patient Communication     Add Comments   Not seen Back to Top          Contains abnormal data Anaerobic Culture  Order: [id number] - Reflex for Order [id number]   Status: Final result         Visible to patient: Yes (not seen)         Next appt: 03/29/2024 at 03:00 PM in Infectious Diseases (GENEVA Gandhi.P.R.NRudi)      Specimen Information: Wound   0 Result Notes          Component 2 wk ago   Significant Indicator POS Positive (POS)   Source WND   Site L Axillary Fluid   Culture Result - Abnormal    Culture Result  Abnormal   Finegoldia magna  Heavy growth    Resulting Agency M              Narrative  Performed by: BENEDICT  Left axillary fluid aspirate  Left axillary fluid aspirate      Specimen Collected: 03/07/24 12:06 PM Last Resulted: 03/10/24  1:58 PM               Impression/Assessment      1. Thrombocytopenia (HCC)        2. Polymicrobial bacterial infection        3. Abscess of axilla, left  CBC WITH DIFFERENTIAL    Comp Metabolic Panel    IR-PICC LINE PLACEMENT W/ GUIDANCE > AGE 6      4. Squamous cell carcinoma of skin        5. Immunosuppressed status (HCC)        6. Anemia of chronic disease        7. Stage 3b chronic kidney disease        8. History of renal transplant        9. Preop testing  Prothrombin Time      10. Pain in other specified joint  Prothrombin Time        Jama Altman is a 68 y.o. male with hx of afib recent watchman procedure,  CAD, renal transplant 2010 (hx of polycystic kidney disease), HLD, HTN and PAD.Admitted 3/6/2024 for nonhealing wound left axilla. He was recently diagnosed with SCCA after biopsy of left axillary mass  2/23/24. CT chest + partially visualized large left axillary mass and necrotic adenopathy consistent with known SCC.  Patchy groundglass density and subsegmental atelectasis, consistent with chronic  inflammatory/infectious process. Drain placed 3/7-cultures of fluid polymicrobial + MRSA (only oral option available is Zyvox), E- faecalis, Fingoldia. Repeat  CT 3/11 : Irregular fluid collection in the LEFT axillary subcutaneous soft tissues measuring 8.3 x 5.1 x 11.1 cm with gas bubbles and drainage catheter in place. IR repositioned and up sized drain on 3/14.  Patient will discharge home on 3/16 with drain in place.  He will follow-up with infectious disease outpatient for drain management and ongoing antibiotics.  Ultimately, patient may require surgical intervention for necrotic tissue that is present.  Patient was discharged home on a 14-day course of p.o. Augmentin 875/125 mg twice daily and p.o. Zyvox 600 mg twice daily.     4/2/24-patient following up after completion of Augmentin and Zyvox. He denies any vomiting, fever, chills, diarrhea. Most recent labs from 3/29/2024, WBC 7.7, anemia worsening 11.9/38.6, thrombocytopenia 48.  Patient was stopped on Zyvox as he completed antibiotic therapy that day.  CMP with creatinine continuing to improve 1.91and GFR slowly improving 38. Pending repeat labs CMP for today. Drain continues to have 20-50 ml of serosanguinous outpatient a day. Pending start date for immunotherapy. 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 Discussed case with ID MD Mccoy. No other oral options available for MRSA treatment. Will continue  with PO Augmentin 875/125 mg 1 tab twice daily as well for coverage of E- faecalis, Fingoldia. Orders placed fr CBC/CMP/INR, PICC placement and home infusions.    - Due to his MRSA and history of renal cell transplant he is limited to only oral Zyvox for antibiotic treatment which can  only be used for 2 weeks before causing worsening thrombocytopenia.  No good oral options for long-term suppression.  Recommend ultimately to have surgical intervention as necrotic tissue will become a nidus for infection.    EKG QTc- 453 from 3/6/24    PLAN:   -  Continue Augmentin 875/125 mg 1 tab twice daily until  IV daptomycin 6 mg/kg can be started  -  After daptomycin started will stop Augmentin as Daptomycin will cover E- faecalis and MRSA then stat PO Flagyl 500 mg 1 tab twice daily   -  PICC line placement   -  Recommend  surgical intervention as necrotic tissue will become a nidus for infection.   -  Repeat lab work CBC/CMP/ CPK weekly   -  Medication education provided and S/S of side effects discussed   -  Recommend routine follow up with oncology and surgery  -  Continue wound care to left arm.  Dressing supplies provided to patient.  Recommended to continue keeping log of drain output  -  Continue po probiotic  -  Education provided on S/S of infection and when to report to ER/ call 911         Return visit: 1 week. Follow up with primary care physician for chronic medical problems      I have performed a physical exam,  updated ROS and plan today. I have reviewed previous images, labs, and provider notes.      BITA Gandhi.    All Patients should seek medical re-evaluation or report to the ER for new, increasing or worsening symptoms. In some circumstances medical conditions can change from the initial evaluation and may require emergent medical re-evaluation. This includes but is not limited to chest pain, shortness of breath, atypical abdominal pain, atypical headache, ALOC, fever >101, low blood pressure, high respiratory rate (above 30), low oxygen saturation (below 90%), acute delirium, abnormal bleeding, inability to tolerate any intake, weakness on one side of the body, any worsened or concerning conditions.    Please note that this dictation was created using voice recognition  software. I have worked with technical experts from [facility] to optimize the interface.  I have made every reasonable attempt to correct obvious errors, but there may be errors of grammar and possibly content that I did not discover before finalizing the note.

## 2024-04-03 ENCOUNTER — TELEPHONE (OUTPATIENT)
Dept: INFECTIOUS DISEASES | Facility: MEDICAL CENTER | Age: 69
End: 2024-04-03
Payer: MEDICARE

## 2024-04-08 ENCOUNTER — HOSPITAL ENCOUNTER (OUTPATIENT)
Dept: LAB | Facility: MEDICAL CENTER | Age: 69
End: 2024-04-08
Attending: NURSE PRACTITIONER
Payer: MEDICARE

## 2024-04-08 ENCOUNTER — HOSPITAL ENCOUNTER (OUTPATIENT)
Dept: LAB | Facility: MEDICAL CENTER | Age: 69
End: 2024-04-08
Attending: INTERNAL MEDICINE
Payer: MEDICARE

## 2024-04-08 DIAGNOSIS — Z01.818 PREOP TESTING: ICD-10-CM

## 2024-04-08 DIAGNOSIS — L02.412 ABSCESS OF AXILLA, LEFT: ICD-10-CM

## 2024-04-08 DIAGNOSIS — M25.59 PAIN IN OTHER SPECIFIED JOINT: ICD-10-CM

## 2024-04-08 LAB
ALBUMIN SERPL BCP-MCNC: 4 G/DL (ref 3.2–4.9)
ALBUMIN SERPL BCP-MCNC: 4.1 G/DL (ref 3.2–4.9)
ALBUMIN/GLOB SERPL: 1.8 G/DL
ALP SERPL-CCNC: 83 U/L (ref 30–99)
ALT SERPL-CCNC: 13 U/L (ref 2–50)
ANION GAP SERPL CALC-SCNC: 9 MMOL/L (ref 7–16)
APPEARANCE UR: CLEAR
AST SERPL-CCNC: 15 U/L (ref 12–45)
BASOPHILS # BLD AUTO: 0.3 % (ref 0–1.8)
BASOPHILS # BLD AUTO: 0.5 % (ref 0–1.8)
BASOPHILS # BLD: 0.02 K/UL (ref 0–0.12)
BASOPHILS # BLD: 0.03 K/UL (ref 0–0.12)
BILIRUB SERPL-MCNC: 0.3 MG/DL (ref 0.1–1.5)
BILIRUB UR QL STRIP.AUTO: NEGATIVE
BUN SERPL-MCNC: 27 MG/DL (ref 8–22)
BUN SERPL-MCNC: 28 MG/DL (ref 8–22)
CALCIUM ALBUM COR SERPL-MCNC: 8.7 MG/DL (ref 8.5–10.5)
CALCIUM ALBUM COR SERPL-MCNC: 8.9 MG/DL (ref 8.5–10.5)
CALCIUM SERPL-MCNC: 8.8 MG/DL (ref 8.4–10.2)
CALCIUM SERPL-MCNC: 8.9 MG/DL (ref 8.5–10.5)
CHLORIDE SERPL-SCNC: 101 MMOL/L (ref 96–112)
CHLORIDE SERPL-SCNC: 105 MMOL/L (ref 96–112)
CO2 SERPL-SCNC: 26 MMOL/L (ref 20–33)
CO2 SERPL-SCNC: 29 MMOL/L (ref 20–33)
COLOR UR: YELLOW
CREAT SERPL-MCNC: 1.46 MG/DL (ref 0.5–1.4)
CREAT SERPL-MCNC: 1.57 MG/DL (ref 0.5–1.4)
CREAT UR-MCNC: 53.27 MG/DL
EOSINOPHIL # BLD AUTO: 0.12 K/UL (ref 0–0.51)
EOSINOPHIL # BLD AUTO: 0.13 K/UL (ref 0–0.51)
EOSINOPHIL NFR BLD: 2 % (ref 0–6.9)
EOSINOPHIL NFR BLD: 2.1 % (ref 0–6.9)
ERYTHROCYTE [DISTWIDTH] IN BLOOD BY AUTOMATED COUNT: 46.9 FL (ref 35.9–50)
ERYTHROCYTE [DISTWIDTH] IN BLOOD BY AUTOMATED COUNT: 47.9 FL (ref 35.9–50)
FASTING STATUS PATIENT QL REPORTED: NORMAL
GFR SERPLBLD CREATININE-BSD FMLA CKD-EPI: 48 ML/MIN/1.73 M 2
GFR SERPLBLD CREATININE-BSD FMLA CKD-EPI: 52 ML/MIN/1.73 M 2
GLOBULIN SER CALC-MCNC: 2.3 G/DL (ref 1.9–3.5)
GLUCOSE SERPL-MCNC: 130 MG/DL (ref 65–99)
GLUCOSE SERPL-MCNC: 158 MG/DL (ref 65–99)
GLUCOSE UR STRIP.AUTO-MCNC: NEGATIVE MG/DL
HCT VFR BLD AUTO: 36.4 % (ref 42–52)
HCT VFR BLD AUTO: 37.2 % (ref 42–52)
HGB BLD-MCNC: 11.3 G/DL (ref 14–18)
HGB BLD-MCNC: 11.4 G/DL (ref 14–18)
IMM GRANULOCYTES # BLD AUTO: 0.12 K/UL (ref 0–0.11)
IMM GRANULOCYTES # BLD AUTO: 0.17 K/UL (ref 0–0.11)
IMM GRANULOCYTES NFR BLD AUTO: 2 % (ref 0–0.9)
IMM GRANULOCYTES NFR BLD AUTO: 2.8 % (ref 0–0.9)
INR PPP: 0.97 (ref 0.87–1.13)
KETONES UR STRIP.AUTO-MCNC: NEGATIVE MG/DL
LEUKOCYTE ESTERASE UR QL STRIP.AUTO: NEGATIVE
LYMPHOCYTES # BLD AUTO: 0.86 K/UL (ref 1–4.8)
LYMPHOCYTES # BLD AUTO: 0.95 K/UL (ref 1–4.8)
LYMPHOCYTES NFR BLD: 14.3 % (ref 22–41)
LYMPHOCYTES NFR BLD: 15.5 % (ref 22–41)
MAGNESIUM SERPL-MCNC: 1.8 MG/DL (ref 1.5–2.5)
MCH RBC QN AUTO: 28 PG (ref 27–33)
MCH RBC QN AUTO: 28.3 PG (ref 27–33)
MCHC RBC AUTO-ENTMCNC: 30.6 G/DL (ref 32.3–36.5)
MCHC RBC AUTO-ENTMCNC: 31 G/DL (ref 32.3–36.5)
MCV RBC AUTO: 91 FL (ref 81.4–97.8)
MCV RBC AUTO: 91.4 FL (ref 81.4–97.8)
MICRO URNS: NORMAL
MONOCYTES # BLD AUTO: 0.58 K/UL (ref 0–0.85)
MONOCYTES # BLD AUTO: 0.7 K/UL (ref 0–0.85)
MONOCYTES NFR BLD AUTO: 11.4 % (ref 0–13.4)
MONOCYTES NFR BLD AUTO: 9.6 % (ref 0–13.4)
NEUTROPHILS # BLD AUTO: 4.14 K/UL (ref 1.82–7.42)
NEUTROPHILS # BLD AUTO: 4.33 K/UL (ref 1.82–7.42)
NEUTROPHILS NFR BLD: 67.7 % (ref 44–72)
NEUTROPHILS NFR BLD: 71.8 % (ref 44–72)
NITRITE UR QL STRIP.AUTO: NEGATIVE
NRBC # BLD AUTO: 0 K/UL
NRBC # BLD AUTO: 0 K/UL
NRBC BLD-RTO: 0 /100 WBC (ref 0–0.2)
NRBC BLD-RTO: 0 /100 WBC (ref 0–0.2)
PH UR STRIP.AUTO: 6.5 [PH] (ref 5–8)
PHOSPHATE SERPL-MCNC: 2.3 MG/DL (ref 2.5–4.5)
PLATELET # BLD AUTO: 126 K/UL (ref 164–446)
PLATELET # BLD AUTO: 128 K/UL (ref 164–446)
PMV BLD AUTO: 12.2 FL (ref 9–12.9)
PMV BLD AUTO: 12.9 FL (ref 9–12.9)
POTASSIUM SERPL-SCNC: 4.4 MMOL/L (ref 3.6–5.5)
POTASSIUM SERPL-SCNC: 5.2 MMOL/L (ref 3.6–5.5)
PROT SERPL-MCNC: 6.4 G/DL (ref 6–8.2)
PROT UR QL STRIP: NEGATIVE MG/DL
PROT UR-MCNC: 6 MG/DL (ref 0–15)
PROT/CREAT UR: 113 MG/G (ref 15–68)
PROTHROMBIN TIME: 13.3 SEC (ref 12–14.6)
RBC # BLD AUTO: 4 M/UL (ref 4.7–6.1)
RBC # BLD AUTO: 4.07 M/UL (ref 4.7–6.1)
RBC UR QL AUTO: NEGATIVE
SODIUM SERPL-SCNC: 139 MMOL/L (ref 135–145)
SODIUM SERPL-SCNC: 141 MMOL/L (ref 135–145)
SP GR UR STRIP.AUTO: 1.01
URATE SERPL-MCNC: 6.3 MG/DL (ref 2.5–8.3)
UROBILINOGEN UR STRIP.AUTO-MCNC: 0.2 MG/DL
WBC # BLD AUTO: 6 K/UL (ref 4.8–10.8)
WBC # BLD AUTO: 6.1 K/UL (ref 4.8–10.8)

## 2024-04-08 PROCEDURE — 84550 ASSAY OF BLOOD/URIC ACID: CPT

## 2024-04-08 PROCEDURE — 85610 PROTHROMBIN TIME: CPT

## 2024-04-08 PROCEDURE — 82570 ASSAY OF URINE CREATININE: CPT

## 2024-04-08 PROCEDURE — 80053 COMPREHEN METABOLIC PANEL: CPT

## 2024-04-08 PROCEDURE — 85025 COMPLETE CBC W/AUTO DIFF WBC: CPT | Mod: 91

## 2024-04-08 PROCEDURE — 84156 ASSAY OF PROTEIN URINE: CPT

## 2024-04-08 PROCEDURE — 85025 COMPLETE CBC W/AUTO DIFF WBC: CPT

## 2024-04-08 PROCEDURE — 80069 RENAL FUNCTION PANEL: CPT

## 2024-04-08 PROCEDURE — 80195 ASSAY OF SIROLIMUS: CPT

## 2024-04-08 PROCEDURE — 83735 ASSAY OF MAGNESIUM: CPT

## 2024-04-08 PROCEDURE — 36415 COLL VENOUS BLD VENIPUNCTURE: CPT

## 2024-04-08 PROCEDURE — 81003 URINALYSIS AUTO W/O SCOPE: CPT

## 2024-04-09 ENCOUNTER — HOSPITAL ENCOUNTER (OUTPATIENT)
Dept: RADIOLOGY | Facility: MEDICAL CENTER | Age: 69
End: 2024-04-09
Attending: NURSE PRACTITIONER
Payer: MEDICARE

## 2024-04-09 ENCOUNTER — TELEPHONE (OUTPATIENT)
Dept: INFECTIOUS DISEASES | Facility: MEDICAL CENTER | Age: 69
End: 2024-04-09
Payer: MEDICARE

## 2024-04-09 DIAGNOSIS — L02.412 ABSCESS OF AXILLA, LEFT: ICD-10-CM

## 2024-04-09 PROCEDURE — 36573 INSJ PICC RS&I 5 YR+: CPT

## 2024-04-09 ASSESSMENT — ENCOUNTER SYMPTOMS
FEVER: 0
BLURRED VISION: 0
WEIGHT LOSS: 0
CONSTIPATION: 0
DOUBLE VISION: 0
DIARRHEA: 0
VOMITING: 0
BRUISES/BLEEDS EASILY: 0
HEADACHES: 0
PALPITATIONS: 0
SHORTNESS OF BREATH: 0
COUGH: 0
NERVOUS/ANXIOUS: 0
MYALGIAS: 0
SPUTUM PRODUCTION: 0
CHILLS: 0
WHEEZING: 0
DIZZINESS: 0
ABDOMINAL PAIN: 0

## 2024-04-09 NOTE — PROGRESS NOTES
INFECTIOUS  DISEASE  OUTPATIENT CLINIC  NOTE   Subjective   Primary care provider: Ganesh Benton III, M.D..     Reason for Follow Up:   Follow-up for   1. Polymicrobial bacterial infection        2. Abscess of axilla, left        3. Squamous cell carcinoma of skin        4. Immunosuppressed status (HCC)        5. Anemia of chronic disease        6. Stage 3b chronic kidney disease        7. History of renal transplant          HPI: Patient previously seen and treated by ID team as inpatient during hospital admission.   Jama Altman is a 68 y.o. male with hx of afib recent watchman procedure,  CAD, renal transplant 2010 (hx of polycystic kidney disease), HLD, HTN and PAD.Admitted 3/6/2024 for nonhealing wound left axilla. He was recently diagnosed with SCCA after biopsy of left axillary mass  2/23/24. CT chest + partially visualized large left axillary mass and necrotic adenopathy consistent with known SCC.  Patchy groundglass density and subsegmental atelectasis, consistent with chronic inflammatory/infectious process. Drain placed 3/7-cultures of fluid polymicrobial + MRSA (only oral option available is Zyvox), E- faecalis, Fingoldia. Repeat  CT 3/11 : Irregular fluid collection in the LEFT axillary subcutaneous soft tissues measuring 8.3 x 5.1 x 11.1 cm with gas bubbles and drainage catheter in place. IR repositioned and up sized drain on 3/14.  Patient will discharge home on 3/16 with drain in place.  He will follow-up with infectious disease outpatient for drain management and ongoing antibiotics.  Ultimately, patient may require surgical intervention for necrotic tissue that is present.  Patient was discharged home on a 14-day course of p.o. Augmentin 875/125 mg twice daily and p.o. Zyvox 600 mg twice daily.     03/22/24-today the patient reports that he has had having nausea while on both Augmentin and Zyvox.  Due to limited antibiotic options recommend spacing antibiotics by 2 hours and continue taking  probiotic to see if he has any relief in symptoms.  He does have as needed Zofran available.  He denies any vomiting, fever, chills, diarrhea.  Left axillary abscess/tumor site with continued drainage.  Patient is keeping a log of drainage output which is slowing compared to hospital admission.  Continues to have 30 to 50 mL of output a day.  We discussed antibiotic treatment options.  Due to his MRSA and history of renal cell transplant he is limited to only oral Zyvox for antibiotic treatment which can only be used for 2 weeks before causing worsening thrombocytopenia.  Patient already has thrombocytopenia 137.  Augmentin is covering the E faecalis and Feingoldia.  Recommended to finish 14-day course of antibiotic therapy which ends on 3/30/24 as there is no good oral options for long-term suppression.  Due to the patient having a drain in place with high output will hopefully prevent worsening infection.  Recommend ultimately to have surgical intervention as necrotic tissue will become a nidus for infection.  Education provided on signs and symptoms of worsening infection and when to report to ER/call 911.  Repeat lab work CBC/CMP in 1 week for close monitoring.  Previous lab work reviewed and discussed with patient from 3/15/2024, WBC 6.6, stable anemia, thrombocytopenia 133, chronic history of elevated creatinine 2.0 and decreased GFR 36 stable.     4/2/24-patient following up after completion of Augmentin and Zyvox. He denies any vomiting, fever, chills, diarrhea. Most recent labs from 3/29/2024, WBC 7.7, anemia worsening 11.9/38.6, thrombocytopenia 48.  Patient was stopped on Zyvox as he completed antibiotic therapy that day.  CMP with creatinine continuing to improve 1.91and GFR slowly improving 38. Pending repeat labs CMP for today. Drain continues to have 20-50 ml of serosanguinous outpatient a day. Pending start date for immunotherapy. Due to concerns of nidus of infection, unable to have I&D due to  squamous cell carcinoma, and pending start date for immunotherapy, will start patient on an indefinite course of IV daptomycin 6 mg/kg ( renal dosed) once daily until surgical plan can be established or repeat CT scan shows resolution of abscess. Discussed case with ROCKY Mccoy. No other oral options available for MRSA treatment. Will continue  with PO Augmentin 875/125 mg 1 tab twice daily as well for coverage of E- faecalis, Fingoldia. Orders placed fr CBC/CMP/INR, PICC placement and home infusions.  After daptomycin started will stop Augmentin as Daptomycin will cover E- faecalis, MRSA, and Finegoldia    4/10/24-patient following up while on p.o. Augmentin 875/125 mg twice daily and IV daptomycin 6 Mg/KG once daily with no end date set due to extent of infection.  Stop Augmentin as daptomycin has been shown to have activity against Finegoldia magna. As previously discussed above there is concerns of nidus of infection but patient was unable to have I&D due to, cell carcinoma.  Left axillary wound continues to have drainage in NICKI bulb.  Decreasing erythema and swelling of left axillary wound. Labs reviewed from 4/8/2024, WBC 6.0, stable anemia 11.3/36.4, neutrophils WNL, CMP chronic but elevated creatinine 1.57 trending down, decreased GFR 48 stable, LFTs WNL.  CPK yet to be drawn.  Patient reports he has been tolerating both antibiotics with no complaints of side effects.  He denies any nausea, vomiting, fever, chills, constipation, diarrhea, muscle pains or aches, cramping.  Continue follow-up with oncology and surgery.  Recommended to continue probiotic.      Current Antimicrobials: IV daptomycin 6 mg/kg once daily indefinitely until CT scan shows resolution of abscess or surgical intervention  is performed  Previous Antimicrobials: Unasyn, Augmentin, Zyvox, Augmentin    Other Current Medications:  Home Medications    Medication Sig Taking? Last Dose Authorizing Provider   glyBURIDE (DIABETA) 5 MG Tab TAKE  2 TABLETS BY MOUTH TWICE DAILY WITH MEALS Yes Taking Ganesh Benton III, M.D.   omeprazole (PRILOSEC) 20 MG delayed-release capsule TAKE 1 CAPSULE BY MOUTH EVERY DAY Yes Taking Ganesh Benton III, M.D.   gabapentin (NEURONTIN) 400 MG Cap Take 1 Capsule by mouth 2 times a day. Yes Taking Spring Romero M.D.   tacrolimus (PROGRAF) 0.5 MG Cap Take 1 Capsule by mouth every evening.  Patient taking differently: Take 0.5 mg by mouth every evening. one in the morning and half at night  Indications: kidney transplant Yes Taking Spring Romero M.D.   sodium bicarbonate (SODIUM BICARBONATE) 650 MG Tab Take 1 Tablet by mouth 2 (two) times a day. Yes Taking Spring Romero M.D.   metoprolol SR (TOPROL XL) 25 MG TABLET SR 24 HR Take 1 Tablet by mouth every day. May take additional one tab daily for heart rate sustaining >110 BPM. Yes Taking GENEVA Cadet.P.RCOLETTE   atorvastatin (LIPITOR) 80 MG tablet Take 1 Tablet by mouth at bedtime. Yes Taking Ganesh Benton III, M.D.   tadalafil (CIALIS) 5 MG tablet : TAKE 1 TABLETS 30 MINUTES BEFORE SEXUAL ACTIVITY, DO NOT EXCEED MORE THAN ONE DOSE A DAY Yes Taking Ganesh Benton III, M.D.   aspirin 81 MG EC tablet Take 1 Tablet by mouth every day. Yes Taking JERRICA SpearsP.RCOLETTE   pioglitazone (ACTOS) 45 MG Tab TAKE ONE TABLET BY MOUTH ONCE DAILY Yes Taking Ganesh Benton III, M.D.   linagliptin (TRADJENTA) 5 MG Tab tablet Take 1 Tablet by mouth every day. Yes Taking Ganesh Benton III, M.D.   predniSONE (DELTASONE) 5 MG Tab Take 1 Tablet by mouth every day. Yes Taking Rich Bolton M.D.   amoxicillin-clavulanate (AUGMENTIN) 875-125 MG Tab Take 1 Tablet by mouth every 12 hours for 30 days.  Patient not taking: Reported on 4/10/2024  Not Taking Spring Romero M.D.        PMH:  Past Medical History:   Diagnosis Date    A-fib (HCC)     Arrhythmia     Benign essential hypertension     Diabetes (HCC)     type 2    Heart burn     Hemorrhagic disorder (HCC)     Eliquis     Hyperlipoproteinemia     Hypertension     not on meds anymore    Indigestion     Myocardial infarct (HCC) 2013    stent    Polycystic kidney 09/10/2010    RIGHT KIDNEY TRANSPLANT    Presence of Watchman left atrial appendage closure device 02/29/2024    Sleep apnea 01/30/2024    states he was told he had sleep apnea but has not had a sleep study    Snoring      Past Surgical History:   Procedure Laterality Date    STENT PLACEMENT  2013    cardiac    KNEE MANIPULATION  02/16/2012    Performed by LATOYA CONNER at SURGERY [facility] ORS    KNEE UNICOMPARTMENTAL  12/23/2011    Performed by LATOYA CONNER at SURGERY [facility] ORS    KNEE ARTHROSCOPY  12/23/2011    Performed by LATOYA CONNER at SURGERY [facility] ORS    KNEE ARTHROSCOPY  05/03/2011    Performed by HANANE GOLDMAN at SURGERY SAME DAY [facility] ORS    MENISCECTOMY, KNEE, MEDIAL  05/03/2011    Performed by HANANE GOLDMAN at SURGERY SAME DAY [facility] ORS    OTHER  09/10/2010    RIGHT KIDNEY TRANSPLANT    KNEE ARTHROPLASTY TOTAL  01/12/2007    RIGHT    KNEE ARTHROSCOPY  04/10/2006    RIGHT    OTHER ORTHOPEDIC SURGERY  07/08/1974    LEFT KNEE DEBRIDEMENT     No family history on file.  Social History     Socioeconomic History    Marital status: Single     Spouse name: Not on file    Number of children: Not on file    Years of education: Not on file    Highest education level: Not on file   Occupational History    Not on file   Tobacco Use    Smoking status: Never     Passive exposure: Never    Smokeless tobacco: Never   Vaping Use    Vaping Use: Never used   Substance and Sexual Activity    Alcohol use: No    Drug use: No    Sexual activity: Yes     Partners: Female   Other Topics Concern    Not on file   Social History Narrative    Not on file     Social Determinants of Health     Financial Resource Strain: Not on file   Food Insecurity: Not on file   Transportation Needs: Not on file   Physical Activity: Not on file   Stress: Not on file   Social  "Connections: Not on file   Intimate Partner Violence: Not on file   Housing Stability: Not on file           Allergies/Intolerances:  Allergies   Allergen Reactions    Doxycycline Rash     Sweats and shakes: 9/28/17: Clarified allergy with patient. Allergy was in 1998 and he doesn't remember what happened. He thought the medication is for pain.  Tolerates doxycycline 9/2017       ROS:   Review of Systems   Constitutional:  Negative for chills, fever, malaise/fatigue and weight loss.   HENT:  Negative for congestion and hearing loss.    Eyes:  Negative for blurred vision and double vision.   Respiratory:  Negative for cough, sputum production, shortness of breath and wheezing.    Cardiovascular:  Negative for chest pain and palpitations.   Gastrointestinal:  Negative for abdominal pain, constipation, diarrhea, nausea and vomiting.   Genitourinary:  Negative for dysuria.   Musculoskeletal:  Negative for back pain, joint pain, myalgias and neck pain.   Skin:  Negative for itching and rash.   Neurological:  Negative for dizziness, focal weakness and headaches.   Endo/Heme/Allergies:  Does not bruise/bleed easily.   Psychiatric/Behavioral:  The patient is not nervous/anxious.       ROS was reviewed and were negative except as above.    Objective    Most Recent Vital Signs:  BP (!) 124/90 (BP Location: Left arm, Patient Position: Sitting, BP Cuff Size: Adult)   Pulse 77   Temp 36.2 °C (97.1 °F) (Temporal)   Resp 16   Ht 1.956 m (6' 5\")   Wt 107 kg (235 lb)   SpO2 92%   BMI 27.87 kg/m²     Physical Exam:  Physical Exam  Vitals reviewed.   Constitutional:       Appearance: Normal appearance.   HENT:      Head: Normocephalic and atraumatic.      Nose: Nose normal.      Mouth/Throat:      Mouth: Mucous membranes are moist.   Eyes:      Pupils: Pupils are equal, round, and reactive to light.   Cardiovascular:      Rate and Rhythm: Normal rate and regular rhythm.   Pulmonary:      Effort: Pulmonary effort is normal.      " Breath sounds: Normal breath sounds.   Abdominal:      Palpations: Abdomen is soft.   Musculoskeletal:         General: No tenderness.      Cervical back: Normal range of motion and neck supple.      Comments: Left axillary carcinoma.  Multiple open wounds.  IR drain with serous output.  Mild surrounding erythema.     Skin:     General: Skin is warm and dry.      Coloration: Skin is not jaundiced.      Findings: No erythema or rash.      Comments: Left arm edema +1   Neurological:      Mental Status: He is alert and oriented to person, place, and time.      Motor: No weakness.   Psychiatric:         Mood and Affect: Mood normal.         Behavior: Behavior normal.          Pertinent Lab/Imaging Results:  [unfilled]  @CMP@  WBC   Date/Time Value Ref Range Status   04/08/2024 09:47 AM 6.0 4.8 - 10.8 K/uL Final     RBC   Date/Time Value Ref Range Status   04/08/2024 09:47 AM 4.00 (L) 4.70 - 6.10 M/uL Final     Hemoglobin   Date/Time Value Ref Range Status   04/08/2024 09:47 AM 11.3 (L) 14.0 - 18.0 g/dL Final     Hematocrit   Date/Time Value Ref Range Status   04/08/2024 09:47 AM 36.4 (L) 42.0 - 52.0 % Final     MCV   Date/Time Value Ref Range Status   04/08/2024 09:47 AM 91.0 81.4 - 97.8 fL Final     MCH   Date/Time Value Ref Range Status   04/08/2024 09:47 AM 28.3 27.0 - 33.0 pg Final     MCHC   Date/Time Value Ref Range Status   04/08/2024 09:47 AM 31.0 (L) 32.3 - 36.5 g/dL Final     Comment:     Please note new reference range effective 05/22/2023.     MPV   Date/Time Value Ref Range Status   04/08/2024 09:47 AM 12.2 9.0 - 12.9 fL Final      Sodium   Date/Time Value Ref Range Status   04/08/2024 09:47  135 - 145 mmol/L Final     Potassium   Date/Time Value Ref Range Status   04/08/2024 09:47 AM 4.4 3.6 - 5.5 mmol/L Final     Chloride   Date/Time Value Ref Range Status   04/08/2024 09:47  96 - 112 mmol/L Final     Co2   Date/Time Value Ref Range Status   04/08/2024 09:47 AM 29 20 - 33 mmol/L Final      Glucose   Date/Time Value Ref Range Status   04/08/2024 09:47  (H) 65 - 99 mg/dL Final     Bun   Date/Time Value Ref Range Status   04/08/2024 09:47 AM 28 (H) 8 - 22 mg/dL Final     Creatinine   Date/Time Value Ref Range Status   04/08/2024 09:47 AM 1.57 (H) 0.50 - 1.40 mg/dL Final   12/18/2006 06:20 AM 2.4 (H) 0.5 - 1.4 mg/dL Final     Alkaline Phosphatase   Date/Time Value Ref Range Status   04/08/2024 09:47 AM 83 30 - 99 U/L Final     AST(SGOT)   Date/Time Value Ref Range Status   04/08/2024 09:47 AM 15 12 - 45 U/L Final     ALT(SGPT)   Date/Time Value Ref Range Status   04/08/2024 09:47 AM 13 2 - 50 U/L Final     Total Bilirubin   Date/Time Value Ref Range Status   04/08/2024 09:47 AM 0.3 0.1 - 1.5 mg/dL Final      CPK Total   Date/Time Value Ref Range Status   06/08/2022 06:11 AM 35 0 - 154 U/L Final      Recent Labs     04/08/24  0947   ASTSGOT 15   ALTSGPT 13   TBILIRUBIN 0.3   ALKPHOSPHAT 83   GLOBULIN 2.3   INR 0.97     Blood Culture   Date Value Ref Range Status   09/27/2017 No growth after 5 days of incubation.  Final     Blood Culture Hold   Date Value Ref Range Status   09/27/2017 Collected  Final       Blood Culture   Date Value Ref Range Status   09/27/2017 No growth after 5 days of incubation.  Final     Blood Culture Hold   Date Value Ref Range Status   09/27/2017 Collected  Final          CULTURE WOUND W/ GRAM STAIN  Order: [id number] - Reflex for Order [id number]   Status: Edited Result - FINAL       Visible to patient: Yes (not seen)       Next appt: 03/29/2024 at 03:00 PM in Infectious Diseases (Ovidio Linton A.P.R.N.)    Specimen Information: Wound   0 Result Notes      Component 2 wk ago   Significant Indicator POS Positive (POS)   Source WND   Site L Axillary Fluid   Culture Result - Abnormal    Gram Stain Result Rare WBCs.  Moderate Gram positive cocci.   Culture Result  Abnormal   Methicillin Resistant Staphylococcus aureus  Heavy growth  This isolate is presumed to be clindamycin  resistant based on  detection of inducible resistance.    Culture Result  Abnormal   Enterococcus faecalis  Heavy growth    Resulting Agency M        Susceptibility     Methicillin resistant staphylococcus aureus Enterococcus faecalis     CESIA CESIA     Ampicillin   <=2 mcg/mL Sensitive     Ampicillin/sulbactam <=8/4 mcg/mL Resistant       Cefazolin <=8 mcg/mL Resistant       Cefepime 8 mcg/mL Resistant       Clindamycin 0.5 mcg/mL Resistant       Daptomycin <=0.5 mcg/mL Sensitive 2 mcg/mL Sensitive     Erythromycin >4 mcg/mL Resistant       Gent Synergy   <=500 mcg/mL Sensitive     Linezolid 2 mcg/mL Sensitive 2 mcg/mL Sensitive     Oxacillin >2 mcg/mL Resistant       Penicillin   2 mcg/mL Sensitive     Tetracycline >8 mcg/mL Resistant >8 mcg/mL Resistant     Trimeth/Sulfa <=0.5/9.5 m... Sensitive       Vancomycin 1 mcg/mL Sensitive 1 mcg/mL Sensitive                    Narrative  Performed by: M  Left axillary fluid aspirate  Left axillary fluid aspirate      Specimen Collected: 03/07/24 12:06 PM Last Resulted: 03/10/24  8:46 AM           CULTURE WOUND W/ GRAM STAIN  Order: [id number] - Reflex for Order [id number]   Status: Edited Result - FINAL       Visible to patient: Yes (not seen)       Next appt: 03/29/2024 at 03:00 PM in Infectious Diseases (Ovidio Linton, A.P.R.N.)    Specimen Information: Wound   0 Result Notes      Component 2 wk ago   Significant Indicator POS Positive (POS)   Source WND   Site L Axillary Fluid   Culture Result - Abnormal    Gram Stain Result Rare WBCs.  Moderate Gram positive cocci.   Culture Result  Abnormal   Methicillin Resistant Staphylococcus aureus  Heavy growth  This isolate is presumed to be clindamycin resistant based on  detection of inducible resistance.    Culture Result  Abnormal   Enterococcus faecalis  Heavy growth    Resulting Agency M        Susceptibility     Methicillin resistant staphylococcus aureus Enterococcus faecalis     CESIA CESIA     Ampicillin   <=2 mcg/mL Sensitive      Ampicillin/sulbactam <=8/4 mcg/mL Resistant       Cefazolin <=8 mcg/mL Resistant       Cefepime 8 mcg/mL Resistant       Clindamycin 0.5 mcg/mL Resistant       Daptomycin <=0.5 mcg/mL Sensitive 2 mcg/mL Sensitive     Erythromycin >4 mcg/mL Resistant       Gent Synergy   <=500 mcg/mL Sensitive     Linezolid 2 mcg/mL Sensitive 2 mcg/mL Sensitive     Oxacillin >2 mcg/mL Resistant       Penicillin   2 mcg/mL Sensitive     Tetracycline >8 mcg/mL Resistant >8 mcg/mL Resistant     Trimeth/Sulfa <=0.5/9.5 m... Sensitive       Vancomycin 1 mcg/mL Sensitive 1 mcg/mL Sensitive                    Narrative  Performed by: BENEDICT  Left axillary fluid aspirate  Left axillary fluid aspirate      Specimen Collected: 03/07/24 12:06 PM Last Resulted: 03/10/24  8:46 AM        Lab Flowsheet        Order Details        View Encounter        Lab and Collection Details        Routing        Result History     View All Conversations on this Encounter           Result Care Coordination      Patient Communication     Add Comments   Not seen Back to Top          Contains abnormal data Anaerobic Culture  Order: [id number] - Reflex for Order [id number]   Status: Final result         Visible to patient: Yes (not seen)         Next appt: 03/29/2024 at 03:00 PM in Infectious Diseases (Ovidio Linton A.P.R.N.)      Specimen Information: Wound   0 Result Notes          Component 2 wk ago   Significant Indicator POS Positive (POS)   Source WND   Site L Axillary Fluid   Culture Result - Abnormal    Culture Result  Abnormal   Finegoldia magna  Heavy growth    Resulting Agency M              Narrative  Performed by: BENEDICT  Left axillary fluid aspirate  Left axillary fluid aspirate      Specimen Collected: 03/07/24 12:06 PM Last Resulted: 03/10/24  1:58 PM               Impression/Assessment      1. Polymicrobial bacterial infection        2. Abscess of axilla, left        3. Squamous cell carcinoma of skin        4. Immunosuppressed status (HCC)        5.  Anemia of chronic disease        6. Stage 3b chronic kidney disease        7. History of renal transplant            Jama Altman is a 68 y.o. male with hx of afib recent watchman procedure,  CAD, renal transplant 2010 (hx of polycystic kidney disease), HLD, HTN and PAD.Admitted 3/6/2024 for nonhealing wound left axilla. He was recently diagnosed with SCCA after biopsy of left axillary mass  2/23/24. CT chest + partially visualized large left axillary mass and necrotic adenopathy consistent with known SCC.  Patchy groundglass density and subsegmental atelectasis, consistent with chronic inflammatory/infectious process. Drain placed 3/7-cultures of fluid polymicrobial + MRSA (only oral option available is Zyvox), E- faecalis, Fingoldia. Repeat  CT 3/11 : Irregular fluid collection in the LEFT axillary subcutaneous soft tissues measuring 8.3 x 5.1 x 11.1 cm with gas bubbles and drainage catheter in place. IR repositioned and up sized drain on 3/14.  Patient will discharge home on 3/16 with drain in place.  He will follow-up with infectious disease outpatient for drain management and ongoing antibiotics.  Ultimately, patient may require surgical intervention for necrotic tissue that is present.  Patient was discharged home on a 14-day course of p.o. Augmentin 875/125 mg twice daily and p.o. Zyvox 600 mg twice daily.  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 Discussed case with ID MD Mccoy. No other oral options available for MRSA treatment.  IV daptomycin 6 Mg/KG once daily 6-week course with possible end date 5/21/2024 with a possibility of extending    4/10/24-patient following up while on p.o. Augmentin 875/125 mg twice daily and IV  daptomycin 6 Mg/KG once daily with no end date set due to extent of infection.  Stop Augmentin as daptomycin has been shown to have activity against Finegoldia magna. As previously discussed above there is concerns of nidus of infection but patient was unable to have I&D due to, cell carcinoma.  Left axillary wound continues to have drainage in NICKI bulb.  Decreasing erythema and swelling of left axillary wound. Labs reviewed from 4/8/2024, WBC 6.0, stable anemia 11.3/36.4, neutrophils WNL, CMP chronic but elevated creatinine 1.57 trending down, decreased GFR 48 stable, LFTs WNL.  CPK yet to be drawn.  Patient reports he has been tolerating both antibiotics with no complaints of side effects.  He denies any nausea, vomiting, fever, chills, constipation, diarrhea, muscle pains or aches, cramping.  Continue follow-up with oncology and surgery.  Recommended to continue probiotic.      - Due to his MRSA and history of renal cell transplant he is limited to only oral Zyvox for antibiotic treatment which can only be used for 2 weeks before causing worsening thrombocytopenia.  No good oral options for long-term suppression.  Recommend ultimately to have surgical intervention as necrotic tissue will become a nidus for infection.    EKG QTc- 453 from 3/6/24    daptomycin has been shown to have activity against Finegoldia magna  PLAN:   -  Stop Augmentin.  Continue IV daptomycin 6 Mg/KG once daily with an indefinite course.  Tentative end date 6 weeks 5/21/24   -  Recommend surgical intervention as necrotic tissue will become a nidus for infection.   -  Repeat lab work CBC/CMP/ CPK weekly   -  Medication education provided and S/S of side effects discussed   -  Recommend routine follow up with oncology and surgery  -  Continue wound care to left arm.  Dressing supplies provided to patient.  Continue keeping log of drain output, currently 20 mL a day without flushes  -  Continue po probiotic  -  Education provided on S/S of  infection and when to report to ER/ call 911       Return visit: 2 weeks. Follow up with primary care physician for chronic medical problems      I have performed a physical exam,  updated ROS and plan today. I have reviewed previous images, labs, and provider notes.      BITA Gandhi.    All Patients should seek medical re-evaluation or report to the ER for new, increasing or worsening symptoms. In some circumstances medical conditions can change from the initial evaluation and may require emergent medical re-evaluation. This includes but is not limited to chest pain, shortness of breath, atypical abdominal pain, atypical headache, ALOC, fever >101, low blood pressure, high respiratory rate (above 30), low oxygen saturation (below 90%), acute delirium, abnormal bleeding, inability to tolerate any intake, weakness on one side of the body, any worsened or concerning conditions.    Please note that this dictation was created using voice recognition software. I have worked with technical experts from [facility]  [employer] to optimize the interface.  I have made every reasonable attempt to correct obvious errors, but there may be errors of grammar and possibly content that I did not discover before finalizing the note.

## 2024-04-09 NOTE — PROCEDURES
Vascular Access Team     Date of Insertion: 4/9/2024  Arm Circumference: 30  Internal length: 47  External Length: 1  Vein Occupancy %: <45%   Reason for PICC: IV ABX  Labs: WBC 6.0, , BUN 28, Cr 1.57, GFR 48, INR 0.97     Consents confirmed, vessel patency confirmed with ultrasound. Risks and benefits of procedure explained to patient and education regarding central line associated bloodstream infections provided. Questions answered.      PICC placed in RUE per licensed provider order with ultrasound guidance.  4 Fr, 1 lumen PICC placed in BASILIC vein after 1 attempt(s). 2 mL of 1% lidocaine injected intradermally at the insertion site. A 21 gauge microintroducer needle was visualized entering the vein and modified Seldinger technique was used to obtain access to the vein. 47 cm catheter inserted and brisk blood return was observed from each lumen upon aspiration. Line secured at the 1 cm marker. TCS stylet removed and observed to be fully intact. Each lumen flushed using pulsatile method without resistance with 10 mL 0.9% normal saline. PICC line secured with Biopatch and Tegaderm.     PICC tip placement location is confirmed by RADIOLOGIST VIA CXR TO BE IN SVC. PICC line is appropriate for use at this time. Patient tolerated procedure well.  Patient condition relayed to primary RN or ordering physician via this post procedure note in the EMR.      Ultrasound images uploaded to PACS and viewable in the EMR - yes  Ultrasound imaged printed and placed in paper chart - no     [employer] PICC ref # [id number], Lot # [id number], Expiration Date 2025-05-31    Pt with Afib. On initial placement blood return obtained, placement via Sherlock appeared to be good. On CXR line noted to be going up neck. Attempted readjustment. Repeat CXR obtained and line noted to be looping in subclavian. Decision made to exchange line. Exchange with second kit performed without difficulty. Upon threading PICC instructed pt to turn head  to right and tuck chin to chest. Location again appeared good with Sherlock. Third CXR obtained and read as good by radiologist.

## 2024-04-09 NOTE — TELEPHONE ENCOUNTER
SHARON Gandhi  You11 minutes ago (11:13 AM)     [facility]  Infusion orders updated with a 6-week course of IV antibiotics with the possibility of extending. Thanks

## 2024-04-09 NOTE — TELEPHONE ENCOUNTER
[facility] pharmacist [facility] called patient will be started on Dapto tomorrow, needs an actual End date placed on infusion order. [facility] cannot accept TBD as end date

## 2024-04-09 NOTE — PROGRESS NOTES
"  PICC Line Instructions    How to Care for your PICC  Do not take a bath, swim, or use hot tubs when you have a PICC. Cover PICC line with clear plastic wrap and tape to keep it dry while showering.  Check the PICC insertion site daily for leakage, redness, swelling, or pain.  Flush the PICC as directed by your health care provider. Let your health care provider know right away if the PICC is difficult to flush or does not flush. Do not use force to flush the PICC.  Do not use a syringe that is less than 10 mL to flush the PICC.  Avoid blood pressure checks on the arm with the PICC.  Do not take the PICC out yourself. Only a trained clinical professional should remove the PICC.  Make sure you or anyone who access your PICC Line washes their hands before using the line.  Make sure the hub of the line is \"scrubbed\" prior to using the line.    Dressing Changes  Change the PICC dressing as instructed by your health care provider.  Change your PICC dressing if it becomes loose or wet.    When to seek medical attention  PICC is accidentally pulled all the way out.  There is any type of drainage, redness, or swelling where the PICC enters the skin.  Noticeable increase in arm circumference due to swelling of arm.  You cannot flush the PICC, it is difficult to flush, or the PICC leaks around the insertion site when it is flushed.  You hear a \"flushing\" sound when the PICC is flushed.  You notice a hole or tear in the PICC.  You develop chills or a fever.  "

## 2024-04-10 ENCOUNTER — HOSPITAL ENCOUNTER (OUTPATIENT)
Facility: MEDICAL CENTER | Age: 69
End: 2024-04-10
Attending: NURSE PRACTITIONER
Payer: MEDICARE

## 2024-04-10 ENCOUNTER — OFFICE VISIT (OUTPATIENT)
Dept: INFECTIOUS DISEASES | Facility: MEDICAL CENTER | Age: 69
End: 2024-04-10
Attending: NURSE PRACTITIONER
Payer: MEDICARE

## 2024-04-10 VITALS
OXYGEN SATURATION: 92 % | WEIGHT: 235 LBS | TEMPERATURE: 97.1 F | DIASTOLIC BLOOD PRESSURE: 90 MMHG | RESPIRATION RATE: 16 BRPM | HEART RATE: 77 BPM | SYSTOLIC BLOOD PRESSURE: 124 MMHG | BODY MASS INDEX: 27.75 KG/M2 | HEIGHT: 77 IN

## 2024-04-10 DIAGNOSIS — Z94.0 HISTORY OF RENAL TRANSPLANT: ICD-10-CM

## 2024-04-10 DIAGNOSIS — C44.92 SQUAMOUS CELL CARCINOMA OF SKIN: ICD-10-CM

## 2024-04-10 DIAGNOSIS — D63.8 ANEMIA OF CHRONIC DISEASE: ICD-10-CM

## 2024-04-10 DIAGNOSIS — A49.9 POLYMICROBIAL BACTERIAL INFECTION: ICD-10-CM

## 2024-04-10 DIAGNOSIS — D84.9 IMMUNOSUPPRESSED STATUS (HCC): ICD-10-CM

## 2024-04-10 DIAGNOSIS — L02.412 ABSCESS OF AXILLA, LEFT: ICD-10-CM

## 2024-04-10 DIAGNOSIS — N18.32 STAGE 3B CHRONIC KIDNEY DISEASE: ICD-10-CM

## 2024-04-10 LAB
ALBUMIN SERPL BCP-MCNC: 3.9 G/DL (ref 3.2–4.9)
ALBUMIN/GLOB SERPL: 2 G/DL
ALP SERPL-CCNC: 73 U/L (ref 30–99)
ALT SERPL-CCNC: 9 U/L (ref 2–50)
ANION GAP SERPL CALC-SCNC: 12 MMOL/L (ref 7–16)
AST SERPL-CCNC: 18 U/L (ref 12–45)
BILIRUB SERPL-MCNC: 0.3 MG/DL (ref 0.1–1.5)
BUN SERPL-MCNC: 37 MG/DL (ref 8–22)
CALCIUM ALBUM COR SERPL-MCNC: 8.7 MG/DL (ref 8.5–10.5)
CALCIUM SERPL-MCNC: 8.6 MG/DL (ref 8.5–10.5)
CHLORIDE SERPL-SCNC: 103 MMOL/L (ref 96–112)
CK SERPL-CCNC: 63 U/L (ref 0–154)
CO2 SERPL-SCNC: 22 MMOL/L (ref 20–33)
CREAT SERPL-MCNC: 1.62 MG/DL (ref 0.5–1.4)
CRP SERPL HS-MCNC: <0.3 MG/DL (ref 0–0.75)
ERYTHROCYTE [DISTWIDTH] IN BLOOD BY AUTOMATED COUNT: 47.4 FL (ref 35.9–50)
GFR SERPLBLD CREATININE-BSD FMLA CKD-EPI: 46 ML/MIN/1.73 M 2
GLOBULIN SER CALC-MCNC: 2 G/DL (ref 1.9–3.5)
GLUCOSE SERPL-MCNC: 284 MG/DL (ref 65–99)
HCT VFR BLD AUTO: 35.5 % (ref 42–52)
HGB BLD-MCNC: 11.2 G/DL (ref 14–18)
MCH RBC QN AUTO: 28.4 PG (ref 27–33)
MCHC RBC AUTO-ENTMCNC: 31.5 G/DL (ref 32.3–36.5)
MCV RBC AUTO: 89.9 FL (ref 81.4–97.8)
PLATELET # BLD AUTO: 105 K/UL (ref 164–446)
PMV BLD AUTO: 12.8 FL (ref 9–12.9)
POTASSIUM SERPL-SCNC: 5 MMOL/L (ref 3.6–5.5)
PROT SERPL-MCNC: 5.9 G/DL (ref 6–8.2)
RBC # BLD AUTO: 3.95 M/UL (ref 4.7–6.1)
SIROLIMUS BLD-MCNC: 14 NG/ML
SODIUM SERPL-SCNC: 137 MMOL/L (ref 135–145)
WBC # BLD AUTO: 6.1 K/UL (ref 4.8–10.8)

## 2024-04-10 PROCEDURE — 3074F SYST BP LT 130 MM HG: CPT | Performed by: NURSE PRACTITIONER

## 2024-04-10 PROCEDURE — 86140 C-REACTIVE PROTEIN: CPT

## 2024-04-10 PROCEDURE — 82550 ASSAY OF CK (CPK): CPT

## 2024-04-10 PROCEDURE — 3080F DIAST BP >= 90 MM HG: CPT | Performed by: NURSE PRACTITIONER

## 2024-04-10 PROCEDURE — 85027 COMPLETE CBC AUTOMATED: CPT

## 2024-04-10 PROCEDURE — 99214 OFFICE O/P EST MOD 30 MIN: CPT | Performed by: NURSE PRACTITIONER

## 2024-04-10 PROCEDURE — 80053 COMPREHEN METABOLIC PANEL: CPT

## 2024-04-10 RX ORDER — METRONIDAZOLE 500 MG/1
500 TABLET ORAL 2 TIMES DAILY
Qty: 60 TABLET | Refills: 1 | Status: CANCELLED | OUTPATIENT
Start: 2024-04-10

## 2024-04-10 ASSESSMENT — ENCOUNTER SYMPTOMS
BACK PAIN: 0
NAUSEA: 0
NECK PAIN: 0
FOCAL WEAKNESS: 0

## 2024-04-10 ASSESSMENT — FIBROSIS 4 INDEX: FIB4 SCORE: 2.18

## 2024-04-15 NOTE — TELEPHONE ENCOUNTER
Received request via: Pharmacy    Was the patient seen in the last year in this department? Yes    Does the patient have an active prescription (recently filled or refills available) for medication(s) requested? No    [facility] Name[facility]    Does the patient have [facility] Plus and need 100 day supply (blood pressure, diabetes and cholesterol meds only)? Medication is not for cholesterol, blood pressure or diabetes

## 2024-04-16 ENCOUNTER — HOSPITAL ENCOUNTER (OUTPATIENT)
Dept: LAB | Facility: MEDICAL CENTER | Age: 69
End: 2024-04-16
Attending: INTERNAL MEDICINE
Payer: MEDICARE

## 2024-04-16 LAB
ALBUMIN SERPL BCP-MCNC: 4.1 G/DL (ref 3.2–4.9)
APPEARANCE UR: CLEAR
BASOPHILS # BLD AUTO: 0.4 % (ref 0–1.8)
BASOPHILS # BLD: 0.03 K/UL (ref 0–0.12)
BILIRUB UR QL STRIP.AUTO: NEGATIVE
BUN SERPL-MCNC: 37 MG/DL (ref 8–22)
CALCIUM ALBUM COR SERPL-MCNC: 8.9 MG/DL (ref 8.5–10.5)
CALCIUM SERPL-MCNC: 9 MG/DL (ref 8.5–10.5)
CHLORIDE SERPL-SCNC: 103 MMOL/L (ref 96–112)
CO2 SERPL-SCNC: 23 MMOL/L (ref 20–33)
COLOR UR: YELLOW
CREAT SERPL-MCNC: 1.63 MG/DL (ref 0.5–1.4)
EOSINOPHIL # BLD AUTO: 0.08 K/UL (ref 0–0.51)
EOSINOPHIL NFR BLD: 1.2 % (ref 0–6.9)
ERYTHROCYTE [DISTWIDTH] IN BLOOD BY AUTOMATED COUNT: 50.5 FL (ref 35.9–50)
GFR SERPLBLD CREATININE-BSD FMLA CKD-EPI: 46 ML/MIN/1.73 M 2
GLUCOSE SERPL-MCNC: 133 MG/DL (ref 65–99)
GLUCOSE UR STRIP.AUTO-MCNC: NEGATIVE MG/DL
HCT VFR BLD AUTO: 41.1 % (ref 42–52)
HGB BLD-MCNC: 12.4 G/DL (ref 14–18)
IMM GRANULOCYTES # BLD AUTO: 0.07 K/UL (ref 0–0.11)
IMM GRANULOCYTES NFR BLD AUTO: 1 % (ref 0–0.9)
KETONES UR STRIP.AUTO-MCNC: NEGATIVE MG/DL
LEUKOCYTE ESTERASE UR QL STRIP.AUTO: NEGATIVE
LYMPHOCYTES # BLD AUTO: 0.98 K/UL (ref 1–4.8)
LYMPHOCYTES NFR BLD: 14.6 % (ref 22–41)
MAGNESIUM SERPL-MCNC: 1.9 MG/DL (ref 1.5–2.5)
MCH RBC QN AUTO: 28.2 PG (ref 27–33)
MCHC RBC AUTO-ENTMCNC: 30.2 G/DL (ref 32.3–36.5)
MCV RBC AUTO: 93.6 FL (ref 81.4–97.8)
MICRO URNS: NORMAL
MONOCYTES # BLD AUTO: 0.84 K/UL (ref 0–0.85)
MONOCYTES NFR BLD AUTO: 12.6 % (ref 0–13.4)
NEUTROPHILS # BLD AUTO: 4.69 K/UL (ref 1.82–7.42)
NEUTROPHILS NFR BLD: 70.2 % (ref 44–72)
NITRITE UR QL STRIP.AUTO: NEGATIVE
NRBC # BLD AUTO: 0 K/UL
NRBC BLD-RTO: 0 /100 WBC (ref 0–0.2)
PH UR STRIP.AUTO: 6 [PH] (ref 5–8)
PHOSPHATE SERPL-MCNC: 2.2 MG/DL (ref 2.5–4.5)
PLATELET # BLD AUTO: 77 K/UL (ref 164–446)
PLATELET BLD QL SMEAR: NORMAL
PLATELETS.RETICULATED NFR BLD AUTO: 12.5 % (ref 0.6–13.1)
PMV BLD AUTO: 13.4 FL (ref 9–12.9)
POTASSIUM SERPL-SCNC: 4.5 MMOL/L (ref 3.6–5.5)
PROT UR QL STRIP: NEGATIVE MG/DL
RBC # BLD AUTO: 4.39 M/UL (ref 4.7–6.1)
RBC UR QL AUTO: NEGATIVE
SODIUM SERPL-SCNC: 140 MMOL/L (ref 135–145)
SP GR UR STRIP.AUTO: 1.02
URATE SERPL-MCNC: 5.3 MG/DL (ref 2.5–8.3)
UROBILINOGEN UR STRIP.AUTO-MCNC: 0.2 MG/DL
WBC # BLD AUTO: 6.7 K/UL (ref 4.8–10.8)

## 2024-04-16 PROCEDURE — 81003 URINALYSIS AUTO W/O SCOPE: CPT

## 2024-04-16 PROCEDURE — 84550 ASSAY OF BLOOD/URIC ACID: CPT

## 2024-04-16 PROCEDURE — 80069 RENAL FUNCTION PANEL: CPT

## 2024-04-16 PROCEDURE — 85025 COMPLETE CBC W/AUTO DIFF WBC: CPT

## 2024-04-16 PROCEDURE — 85055 RETICULATED PLATELET ASSAY: CPT

## 2024-04-16 PROCEDURE — 36415 COLL VENOUS BLD VENIPUNCTURE: CPT

## 2024-04-16 PROCEDURE — 84156 ASSAY OF PROTEIN URINE: CPT

## 2024-04-16 PROCEDURE — 80195 ASSAY OF SIROLIMUS: CPT

## 2024-04-16 PROCEDURE — 83735 ASSAY OF MAGNESIUM: CPT

## 2024-04-16 RX ORDER — OMEPRAZOLE 20 MG/1
20 CAPSULE, DELAYED RELEASE ORAL DAILY
Qty: 90 CAPSULE | Refills: 3 | Status: SHIPPED | OUTPATIENT
Start: 2024-04-16

## 2024-04-17 ENCOUNTER — HOSPITAL ENCOUNTER (OUTPATIENT)
Facility: MEDICAL CENTER | Age: 69
End: 2024-04-17
Attending: NURSE PRACTITIONER
Payer: MEDICARE

## 2024-04-17 LAB
ALBUMIN SERPL BCP-MCNC: 3.9 G/DL (ref 3.2–4.9)
ALBUMIN/GLOB SERPL: 1.9 G/DL
ALP SERPL-CCNC: 85 U/L (ref 30–99)
ALT SERPL-CCNC: 16 U/L (ref 2–50)
ANION GAP SERPL CALC-SCNC: 13 MMOL/L (ref 7–16)
AST SERPL-CCNC: 21 U/L (ref 12–45)
BILIRUB SERPL-MCNC: 0.4 MG/DL (ref 0.1–1.5)
BUN SERPL-MCNC: 37 MG/DL (ref 8–22)
CALCIUM ALBUM COR SERPL-MCNC: 8.7 MG/DL (ref 8.5–10.5)
CALCIUM SERPL-MCNC: 8.6 MG/DL (ref 8.5–10.5)
CHLORIDE SERPL-SCNC: 101 MMOL/L (ref 96–112)
CK SERPL-CCNC: 75 U/L (ref 0–154)
CO2 SERPL-SCNC: 22 MMOL/L (ref 20–33)
CREAT SERPL-MCNC: 1.6 MG/DL (ref 0.5–1.4)
ERYTHROCYTE [DISTWIDTH] IN BLOOD BY AUTOMATED COUNT: 47.5 FL (ref 35.9–50)
GFR SERPLBLD CREATININE-BSD FMLA CKD-EPI: 47 ML/MIN/1.73 M 2
GLOBULIN SER CALC-MCNC: 2.1 G/DL (ref 1.9–3.5)
GLUCOSE SERPL-MCNC: 343 MG/DL (ref 65–99)
HCT VFR BLD AUTO: 37.1 % (ref 42–52)
HGB BLD-MCNC: 11.7 G/DL (ref 14–18)
MCH RBC QN AUTO: 28.5 PG (ref 27–33)
MCHC RBC AUTO-ENTMCNC: 31.5 G/DL (ref 32.3–36.5)
MCV RBC AUTO: 90.5 FL (ref 81.4–97.8)
PLATELET # BLD AUTO: 71 K/UL (ref 164–446)
PLATELETS.RETICULATED NFR BLD AUTO: 15.4 % (ref 0.6–13.1)
PMV BLD AUTO: 13.9 FL (ref 9–12.9)
POTASSIUM SERPL-SCNC: 5.1 MMOL/L (ref 3.6–5.5)
PROT SERPL-MCNC: 6 G/DL (ref 6–8.2)
PROT UR-MCNC: 13 MG/DL (ref 0–15)
RBC # BLD AUTO: 4.1 M/UL (ref 4.7–6.1)
SODIUM SERPL-SCNC: 136 MMOL/L (ref 135–145)
WBC # BLD AUTO: 6.6 K/UL (ref 4.8–10.8)

## 2024-04-17 PROCEDURE — 80053 COMPREHEN METABOLIC PANEL: CPT

## 2024-04-17 PROCEDURE — 85055 RETICULATED PLATELET ASSAY: CPT

## 2024-04-17 PROCEDURE — 85027 COMPLETE CBC AUTOMATED: CPT

## 2024-04-17 PROCEDURE — 82550 ASSAY OF CK (CPK): CPT

## 2024-04-18 LAB — SIROLIMUS BLD-MCNC: 14.9 NG/ML

## 2024-04-19 ENCOUNTER — OFFICE VISIT (OUTPATIENT)
Dept: INFECTIOUS DISEASES | Facility: MEDICAL CENTER | Age: 69
End: 2024-04-19
Attending: NURSE PRACTITIONER
Payer: MEDICARE

## 2024-04-19 VITALS
DIASTOLIC BLOOD PRESSURE: 80 MMHG | RESPIRATION RATE: 16 BRPM | BODY MASS INDEX: 27.75 KG/M2 | HEART RATE: 83 BPM | WEIGHT: 235 LBS | TEMPERATURE: 97.9 F | HEIGHT: 77 IN | OXYGEN SATURATION: 94 % | SYSTOLIC BLOOD PRESSURE: 110 MMHG

## 2024-04-19 DIAGNOSIS — Z94.0 HISTORY OF RENAL TRANSPLANT: ICD-10-CM

## 2024-04-19 DIAGNOSIS — L02.412 ABSCESS OF AXILLA, LEFT: ICD-10-CM

## 2024-04-19 DIAGNOSIS — C44.92 SQUAMOUS CELL CARCINOMA OF SKIN: ICD-10-CM

## 2024-04-19 DIAGNOSIS — D63.8 ANEMIA OF CHRONIC DISEASE: ICD-10-CM

## 2024-04-19 DIAGNOSIS — D84.9 IMMUNOSUPPRESSED STATUS (HCC): ICD-10-CM

## 2024-04-19 DIAGNOSIS — A49.9 POLYMICROBIAL BACTERIAL INFECTION: ICD-10-CM

## 2024-04-19 DIAGNOSIS — N18.32 STAGE 3B CHRONIC KIDNEY DISEASE: ICD-10-CM

## 2024-04-19 PROCEDURE — 3074F SYST BP LT 130 MM HG: CPT | Performed by: NURSE PRACTITIONER

## 2024-04-19 PROCEDURE — 99214 OFFICE O/P EST MOD 30 MIN: CPT | Performed by: NURSE PRACTITIONER

## 2024-04-19 PROCEDURE — 3079F DIAST BP 80-89 MM HG: CPT | Performed by: NURSE PRACTITIONER

## 2024-04-19 PROCEDURE — 99212 OFFICE O/P EST SF 10 MIN: CPT | Performed by: NURSE PRACTITIONER

## 2024-04-19 ASSESSMENT — ENCOUNTER SYMPTOMS
VOMITING: 0
WEIGHT LOSS: 0
DIZZINESS: 0
FEVER: 0
BACK PAIN: 0
SHORTNESS OF BREATH: 0
DOUBLE VISION: 0
ABDOMINAL PAIN: 0
HEADACHES: 0
NECK PAIN: 0
BRUISES/BLEEDS EASILY: 0
NERVOUS/ANXIOUS: 0
BLURRED VISION: 0
CHILLS: 0
WHEEZING: 0
MYALGIAS: 0
COUGH: 0
FOCAL WEAKNESS: 0
CONSTIPATION: 0
PALPITATIONS: 0
NAUSEA: 0
DIARRHEA: 0
SPUTUM PRODUCTION: 0

## 2024-04-19 ASSESSMENT — FIBROSIS 4 INDEX: FIB4 SCORE: 4.95

## 2024-04-19 NOTE — PROGRESS NOTES
INFECTIOUS  DISEASE  OUTPATIENT CLINIC  NOTE   Subjective   Primary care provider: Ganesh Benton III, M.D..     Reason for Follow Up:   Follow-up for   1. Polymicrobial bacterial infection        2. Abscess of axilla, left        3. Squamous cell carcinoma of skin        4. Immunosuppressed status (HCC)        5. Anemia of chronic disease        6. Stage 3b chronic kidney disease        7. History of renal transplant            HPI: Patient previously seen and treated by ID team as inpatient during hospital admission.   Jama Altman is a 68 y.o. male with hx of afib recent watchman procedure,  CAD, renal transplant 2010 (hx of polycystic kidney disease), HLD, HTN and PAD.Admitted 3/6/2024 for nonhealing wound left axilla. He was recently diagnosed with SCCA after biopsy of left axillary mass  2/23/24. CT chest + partially visualized large left axillary mass and necrotic adenopathy consistent with known SCC.  Patchy groundglass density and subsegmental atelectasis, consistent with chronic inflammatory/infectious process. Drain placed 3/7-cultures of fluid polymicrobial + MRSA (only oral option available is Zyvox), E- faecalis, Fingoldia. Repeat  CT 3/11 : Irregular fluid collection in the LEFT axillary subcutaneous soft tissues measuring 8.3 x 5.1 x 11.1 cm with gas bubbles and drainage catheter in place. IR repositioned and up sized drain on 3/14.  Patient will discharge home on 3/16 with drain in place.  He will follow-up with infectious disease outpatient for drain management and ongoing antibiotics.  Ultimately, patient may require surgical intervention for necrotic tissue that is present.  Patient was discharged home on a 14-day course of p.o. Augmentin 875/125 mg twice daily and p.o. Zyvox 600 mg twice daily.     03/22/24-today the patient reports that he has had having nausea while on both Augmentin and Zyvox.  Due to limited antibiotic options recommend spacing antibiotics by 2 hours and continue taking  probiotic to see if he has any relief in symptoms.  He does have as needed Zofran available.  He denies any vomiting, fever, chills, diarrhea.  Left axillary abscess/tumor site with continued drainage.  Patient is keeping a log of drainage output which is slowing compared to hospital admission.  Continues to have 30 to 50 mL of output a day.  We discussed antibiotic treatment options.  Due to his MRSA and history of renal cell transplant he is limited to only oral Zyvox for antibiotic treatment which can only be used for 2 weeks before causing worsening thrombocytopenia.  Patient already has thrombocytopenia 137.  Augmentin is covering the E faecalis and Feingoldia.  Recommended to finish 14-day course of antibiotic therapy which ends on 3/30/24 as there is no good oral options for long-term suppression.  Due to the patient having a drain in place with high output will hopefully prevent worsening infection.  Recommend ultimately to have surgical intervention as necrotic tissue will become a nidus for infection.  Education provided on signs and symptoms of worsening infection and when to report to ER/call 911.  Repeat lab work CBC/CMP in 1 week for close monitoring.  Previous lab work reviewed and discussed with patient from 3/15/2024, WBC 6.6, stable anemia, thrombocytopenia 133, chronic history of elevated creatinine 2.0 and decreased GFR 36 stable.     4/2/24-patient following up after completion of Augmentin and Zyvox. He denies any vomiting, fever, chills, diarrhea. Most recent labs from 3/29/2024, WBC 7.7, anemia worsening 11.9/38.6, thrombocytopenia 48.  Patient was stopped on Zyvox as he completed antibiotic therapy that day.  CMP with creatinine continuing to improve 1.91and GFR slowly improving 38. Pending repeat labs CMP for today. Drain continues to have 20-50 ml of serosanguinous outpatient a day. Pending start date for immunotherapy. Due to concerns of nidus of infection, unable to have I&D due to  squamous cell carcinoma, and pending start date for immunotherapy, will start patient on an indefinite course of IV daptomycin 6 mg/kg ( renal dosed) once daily until surgical plan can be established or repeat CT scan shows resolution of abscess. Discussed case with ROCKY Mccoy. No other oral options available for MRSA treatment. Will continue  with PO Augmentin 875/125 mg 1 tab twice daily as well for coverage of E- faecalis, Fingoldia. Orders placed fr CBC/CMP/INR, PICC placement and home infusions.  After daptomycin started will stop Augmentin as Daptomycin will cover E- faecalis, MRSA, and Finegoldia    4/10/24-patient following up while on p.o. Augmentin 875/125 mg twice daily and IV daptomycin 6 Mg/KG once daily with no end date set due to extent of infection.  Stop Augmentin as daptomycin has been shown to have activity against Finegoldia magna. As previously discussed above there is concerns of nidus of infection but patient was unable to have I&D due to, cell carcinoma.  Left axillary wound continues to have drainage in NICKI bulb.  Decreasing erythema and swelling of left axillary wound. Labs reviewed from 4/8/2024, WBC 6.0, stable anemia 11.3/36.4, neutrophils WNL, CMP chronic but elevated creatinine 1.57 trending down, decreased GFR 48 stable, LFTs WNL.  CPK yet to be drawn.  Patient reports he has been tolerating both antibiotics with no complaints of side effects.  He denies any nausea, vomiting, fever, chills, constipation, diarrhea, muscle pains or aches, cramping.  Continue follow-up with oncology and surgery.  Recommended to continue probiotic.      4/19/24-patient following up while on IV daptomycin 6 Mg/KG daily.  Patient reports he is still tolerating IV daptomycin with no complaints of side effects.  He denies any nausea, vomiting, fever, chills, constipation, diarrhea, muscle pains or aches.  Left axillary wound continues to decrease in size NICKI bulb has less output.  Most recent labs reviewed  from 4/17/2024, WBC 6.6, stable anemia 11.7/37.1, thrombocytopenia chronic and stable 71, CMP chronically elevated creatinine 1.60 stable, decreased GFR 47 stable, LFTs WNL, CPK 75 and stable.  Continue daily infusions of IV daptomycin and follow-up with oncology and surgery.  Discussed case with oncology MD Fajardo, outside PET scan shows uptake in T5, possible metastasis versus infection.  Given that patient has been on IV daptomycin and no previous reports of bacteremia more likely metastasis but will wait for MRI of T and L-spine which was ordered by MD Fajardo.  Left axillary NICKI bulb and extension tubing changed in office.  Continues to have decreased swelling and left axillary.  Necrotic tissue with odorous smell, no signs of worsening infection.  Serosanguineous drainage in NICKI bulb 20 mL daily.  Continue with IV daptomycin 6 Mg/KG daily with weekly labs CBC/CMP/CPK.  Ultimately would require I&D of left axillary to remove nidus of infection    Current Antimicrobials: IV daptomycin 6 mg/kg once daily indefinitely until CT scan shows resolution of abscess or surgical intervention  is performed  Previous Antimicrobials: Unasyn, Augmentin, Zyvox, Augmentin    Other Current Medications:  Home Medications    Medication Sig Taking? Last Dose Authorizing Provider   omeprazole (PRILOSEC) 20 MG delayed-release capsule Take 1 Capsule by mouth every day. Indications: Heartburn Yes Taking Ganesh Benton III, M.D.   glyBURIDE (DIABETA) 5 MG Tab TAKE 2 TABLETS BY MOUTH TWICE DAILY WITH MEALS Yes Taking Ganesh Benton III, M.D.   gabapentin (NEURONTIN) 400 MG Cap Take 1 Capsule by mouth 2 times a day. Yes Taking Spring Romero M.D.   tacrolimus (PROGRAF) 0.5 MG Cap Take 1 Capsule by mouth every evening.  Patient taking differently: Take 0.5 mg by mouth every evening. one in the morning and half at night  Indications: kidney transplant Yes Taking Spring Romero M.D.   sodium bicarbonate (SODIUM BICARBONATE) 650 MG Tab Take 1  Tablet by mouth 2 (two) times a day. Yes Taking Spring Romero M.D.   metoprolol SR (TOPROL XL) 25 MG TABLET SR 24 HR Take 1 Tablet by mouth every day. May take additional one tab daily for heart rate sustaining >110 BPM. Yes Taking SHARON Cadet   tadalafil (CIALIS) 5 MG tablet : TAKE 1 TABLETS 30 MINUTES BEFORE SEXUAL ACTIVITY, DO NOT EXCEED MORE THAN ONE DOSE A DAY Yes Taking Ganesh Benton III, M.D.   aspirin 81 MG EC tablet Take 1 Tablet by mouth every day. Yes Taking SHARON Spears   pioglitazone (ACTOS) 45 MG Tab TAKE ONE TABLET BY MOUTH ONCE DAILY Yes Taking Ganesh Benton III, M.D.   linagliptin (TRADJENTA) 5 MG Tab tablet Take 1 Tablet by mouth every day. Yes Taking Ganesh Benton III, M.D.   predniSONE (DELTASONE) 5 MG Tab Take 1 Tablet by mouth every day. Yes Taking Rich Bolton M.D.   atorvastatin (LIPITOR) 80 MG tablet Take 1 Tablet by mouth at bedtime.  Patient not taking: Reported on 4/19/2024  Not Taking Ganesh Benton III, M.D.        PMH:  Past Medical History:   Diagnosis Date    A-fib (HCC)     Arrhythmia     Benign essential hypertension     Diabetes (HCC)     type 2    Heart burn     Hemorrhagic disorder (HCC)     Eliquis    Hyperlipoproteinemia     Hypertension     not on meds anymore    Indigestion     Myocardial infarct (HCC) 2013    stent    Polycystic kidney 09/10/2010    RIGHT KIDNEY TRANSPLANT    Presence of Watchman left atrial appendage closure device 02/29/2024    Sleep apnea 01/30/2024    states he was told he had sleep apnea but has not had a sleep study    Snoring      Past Surgical History:   Procedure Laterality Date    STENT PLACEMENT  2013    cardiac    KNEE MANIPULATION  02/16/2012    Performed by LATOYA CONNER at SURGERY [facility]    KNEE UNICOMPARTMENTAL  12/23/2011    Performed by LATOYA CONNER at SURGERY [facility]    KNEE ARTHROSCOPY  12/23/2011    Performed by LATOYA CONNER at SURGERY [facility]    KNEE ARTHROSCOPY   05/03/2011    Performed by HANANE GOLDMAN at SURGERY SAME DAY [facility]    MENISCECTOMY, KNEE, MEDIAL  05/03/2011    Performed by HANANE GOLDMAN at SURGERY SAME DAY [facility]    OTHER  09/10/2010    RIGHT KIDNEY TRANSPLANT    KNEE ARTHROPLASTY TOTAL  01/12/2007    RIGHT    KNEE ARTHROSCOPY  04/10/2006    RIGHT    OTHER ORTHOPEDIC SURGERY  07/08/1974    LEFT KNEE DEBRIDEMENT     No family history on file.  Social History     Socioeconomic History    Marital status: Single     Spouse name: Not on file    Number of children: Not on file    Years of education: Not on file    Highest education level: Not on file   Occupational History    Not on file   Tobacco Use    Smoking status: Never     Passive exposure: Never    Smokeless tobacco: Never   Vaping Use    Vaping Use: Never used   Substance and Sexual Activity    Alcohol use: No    Drug use: No    Sexual activity: Yes     Partners: Female   Other Topics Concern    Not on file   Social History Narrative    Not on file     Social Determinants of Health     Financial Resource Strain: Not on file   Food Insecurity: Not on file   Transportation Needs: Not on file   Physical Activity: Not on file   Stress: Not on file   Social Connections: Not on file   Intimate Partner Violence: Not on file   Housing Stability: Not on file           Allergies/Intolerances:  Allergies   Allergen Reactions    Doxycycline Rash     Sweats and shakes: 9/28/17: Clarified allergy with patient. Allergy was in 1998 and he doesn't remember what happened. He thought the medication is for pain.  Tolerates doxycycline 9/2017       ROS:   Review of Systems   Constitutional:  Negative for chills, fever, malaise/fatigue and weight loss.   HENT:  Negative for congestion and hearing loss.    Eyes:  Negative for blurred vision and double vision.   Respiratory:  Negative for cough, sputum production, shortness of breath and wheezing.    Cardiovascular:  Negative for chest pain and palpitations.  "  Gastrointestinal:  Negative for abdominal pain, constipation, diarrhea, nausea and vomiting.   Genitourinary:  Negative for dysuria.   Musculoskeletal:  Negative for back pain, joint pain, myalgias and neck pain.   Skin:  Negative for itching and rash.   Neurological:  Negative for dizziness, focal weakness and headaches.   Endo/Heme/Allergies:  Does not bruise/bleed easily.   Psychiatric/Behavioral:  The patient is not nervous/anxious.       ROS was reviewed and were negative except as above.    Objective    Most Recent Vital Signs:  /80 (BP Location: Right arm, Patient Position: Sitting, BP Cuff Size: Adult)   Pulse 83   Temp 36.6 °C (97.9 °F) (Temporal)   Resp 16   Ht 1.956 m (6' 5\")   Wt 107 kg (235 lb)   SpO2 94%   BMI 27.87 kg/m²     Physical Exam:  Physical Exam  Vitals reviewed.   Constitutional:       Appearance: Normal appearance.   HENT:      Head: Normocephalic and atraumatic.      Nose: Nose normal.      Mouth/Throat:      Mouth: Mucous membranes are moist.   Eyes:      Pupils: Pupils are equal, round, and reactive to light.   Cardiovascular:      Rate and Rhythm: Normal rate and regular rhythm.   Pulmonary:      Effort: Pulmonary effort is normal.      Breath sounds: Normal breath sounds.   Abdominal:      Palpations: Abdomen is soft.   Musculoskeletal:         General: No tenderness.      Cervical back: Normal range of motion and neck supple.      Comments: Left axillary carcinoma.  Multiple open wounds.  IR drain with serous output.  Decreasing surrounding erythema.     Skin:     General: Skin is warm and dry.      Coloration: Skin is not jaundiced.      Findings: No erythema or rash.      Comments: Left arm edema +1   Neurological:      Mental Status: He is alert and oriented to person, place, and time.      Motor: No weakness.   Psychiatric:         Mood and Affect: Mood normal.         Behavior: Behavior normal.          Pertinent Lab/Imaging Results:  [unfilled]  @CMP@  WBC "   Date/Time Value Ref Range Status   04/17/2024 03:30 PM 6.6 4.8 - 10.8 K/uL Final     RBC   Date/Time Value Ref Range Status   04/17/2024 03:30 PM 4.10 (L) 4.70 - 6.10 M/uL Final     Hemoglobin   Date/Time Value Ref Range Status   04/17/2024 03:30 PM 11.7 (L) 14.0 - 18.0 g/dL Final     Hematocrit   Date/Time Value Ref Range Status   04/17/2024 03:30 PM 37.1 (L) 42.0 - 52.0 % Final     MCV   Date/Time Value Ref Range Status   04/17/2024 03:30 PM 90.5 81.4 - 97.8 fL Final     MCH   Date/Time Value Ref Range Status   04/17/2024 03:30 PM 28.5 27.0 - 33.0 pg Final     MCHC   Date/Time Value Ref Range Status   04/17/2024 03:30 PM 31.5 (L) 32.3 - 36.5 g/dL Final     Comment:     Please note new reference range effective 05/22/2023.     MPV   Date/Time Value Ref Range Status   04/17/2024 03:30 PM 13.9 (H) 9.0 - 12.9 fL Final      Sodium   Date/Time Value Ref Range Status   04/17/2024 03:30  135 - 145 mmol/L Final     Potassium   Date/Time Value Ref Range Status   04/17/2024 03:30 PM 5.1 3.6 - 5.5 mmol/L Final     Chloride   Date/Time Value Ref Range Status   04/17/2024 03:30  96 - 112 mmol/L Final     Co2   Date/Time Value Ref Range Status   04/17/2024 03:30 PM 22 20 - 33 mmol/L Final     Glucose   Date/Time Value Ref Range Status   04/17/2024 03:30  (H) 65 - 99 mg/dL Final     Bun   Date/Time Value Ref Range Status   04/17/2024 03:30 PM 37 (H) 8 - 22 mg/dL Final     Creatinine   Date/Time Value Ref Range Status   04/17/2024 03:30 PM 1.60 (H) 0.50 - 1.40 mg/dL Final   12/18/2006 06:20 AM 2.4 (H) 0.5 - 1.4 mg/dL Final     Alkaline Phosphatase   Date/Time Value Ref Range Status   04/17/2024 03:30 PM 85 30 - 99 U/L Final     AST(SGOT)   Date/Time Value Ref Range Status   04/17/2024 03:30 PM 21 12 - 45 U/L Final     ALT(SGPT)   Date/Time Value Ref Range Status   04/17/2024 03:30 PM 16 2 - 50 U/L Final     Total Bilirubin   Date/Time Value Ref Range Status   04/17/2024 03:30 PM 0.4 0.1 - 1.5 mg/dL Final       CPK Total   Date/Time Value Ref Range Status   04/17/2024 03:30 PM 75 0 - 154 U/L Final      Recent Labs     04/17/24  1530   ASTSGOT 21   ALTSGPT 16   TBILIRUBIN 0.4   ALKPHOSPHAT 85   GLOBULIN 2.1     Blood Culture   Date Value Ref Range Status   09/27/2017 No growth after 5 days of incubation.  Final     Blood Culture Hold   Date Value Ref Range Status   09/27/2017 Collected  Final       Blood Culture   Date Value Ref Range Status   09/27/2017 No growth after 5 days of incubation.  Final     Blood Culture Hold   Date Value Ref Range Status   09/27/2017 Collected  Final          CULTURE WOUND W/ GRAM STAIN  Order: [id number] - Reflex for Order [id number]   Status: Edited Result - FINAL       Visible to patient: Yes (not seen)       Next appt: 03/29/2024 at 03:00 PM in Infectious Diseases (Ovidio Linton A.P.R.N.)    Specimen Information: Wound   0 Result Notes      Component 2 wk ago   Significant Indicator POS Positive (POS)   Source WND   Site L Axillary Fluid   Culture Result - Abnormal    Gram Stain Result Rare WBCs.  Moderate Gram positive cocci.   Culture Result  Abnormal   Methicillin Resistant Staphylococcus aureus  Heavy growth  This isolate is presumed to be clindamycin resistant based on  detection of inducible resistance.    Culture Result  Abnormal   Enterococcus faecalis  Heavy growth    Resulting Agency M        Susceptibility     Methicillin resistant staphylococcus aureus Enterococcus faecalis     CESIA CESIA     Ampicillin   <=2 mcg/mL Sensitive     Ampicillin/sulbactam <=8/4 mcg/mL Resistant       Cefazolin <=8 mcg/mL Resistant       Cefepime 8 mcg/mL Resistant       Clindamycin 0.5 mcg/mL Resistant       Daptomycin <=0.5 mcg/mL Sensitive 2 mcg/mL Sensitive     Erythromycin >4 mcg/mL Resistant       Gent Synergy   <=500 mcg/mL Sensitive     Linezolid 2 mcg/mL Sensitive 2 mcg/mL Sensitive     Oxacillin >2 mcg/mL Resistant       Penicillin   2 mcg/mL Sensitive     Tetracycline >8 mcg/mL Resistant >8  mcg/mL Resistant     Trimeth/Sulfa <=0.5/9.5 m... Sensitive       Vancomycin 1 mcg/mL Sensitive 1 mcg/mL Sensitive                    Narrative  Performed by: BENEDICT  Left axillary fluid aspirate  Left axillary fluid aspirate      Specimen Collected: 03/07/24 12:06 PM Last Resulted: 03/10/24  8:46 AM           CULTURE WOUND W/ GRAM STAIN  Order: [id number] - Reflex for Order [id number]   Status: Edited Result - FINAL       Visible to patient: Yes (not seen)       Next appt: 03/29/2024 at 03:00 PM in Infectious Diseases (Ovidio Linton A.P.R.N[facility]    Specimen Information: Wound   0 Result Notes      Component 2 wk ago   Significant Indicator POS Positive (POS)   Source WND   Site L Axillary Fluid   Culture Result - Abnormal    Gram Stain Result Rare WBCs.  Moderate Gram positive cocci.   Culture Result  Abnormal   Methicillin Resistant Staphylococcus aureus  Heavy growth  This isolate is presumed to be clindamycin resistant based on  detection of inducible resistance.    Culture Result  Abnormal   Enterococcus faecalis  Heavy growth    Resulting Agency M        Susceptibility     Methicillin resistant staphylococcus aureus Enterococcus faecalis     CESIA CESIA     Ampicillin   <=2 mcg/mL Sensitive     Ampicillin/sulbactam <=8/4 mcg/mL Resistant       Cefazolin <=8 mcg/mL Resistant       Cefepime 8 mcg/mL Resistant       Clindamycin 0.5 mcg/mL Resistant       Daptomycin <=0.5 mcg/mL Sensitive 2 mcg/mL Sensitive     Erythromycin >4 mcg/mL Resistant       Gent Synergy   <=500 mcg/mL Sensitive     Linezolid 2 mcg/mL Sensitive 2 mcg/mL Sensitive     Oxacillin >2 mcg/mL Resistant       Penicillin   2 mcg/mL Sensitive     Tetracycline >8 mcg/mL Resistant >8 mcg/mL Resistant     Trimeth/Sulfa <=0.5/9.5 m... Sensitive       Vancomycin 1 mcg/mL Sensitive 1 mcg/mL Sensitive                    Narrative  Performed by: BENEDICT  Left axillary fluid aspirate  Left axillary fluid aspirate      Specimen Collected: 03/07/24 12:06 PM Last Resulted:  03/10/24  8:46 AM        Lab Flowsheet        Order Details        View Encounter        Lab and Collection Details        Routing        Result History     View All Conversations on this Encounter           Result Care Coordination      Patient Communication     Add Comments   Not seen Back to Top          Contains abnormal data Anaerobic Culture  Order: [id number] - Reflex for Order [id number]   Status: Final result         Visible to patient: Yes (not seen)         Next appt: 03/29/2024 at 03:00 PM in Infectious Diseases (Ovidio Linton A.P.R.N[facility]      Specimen Information: Wound   0 Result Notes          Component 2 wk ago   Significant Indicator POS Positive (POS)   Source WND   Site L Axillary Fluid   Culture Result - Abnormal    Culture Result  Abnormal   Finegoldia magna  Heavy growth    Resulting Agency M              Narrative  Performed by: M  Left axillary fluid aspirate  Left axillary fluid aspirate      Specimen Collected: 03/07/24 12:06 PM Last Resulted: 03/10/24  1:58 PM               Impression/Assessment      1. Polymicrobial bacterial infection        2. Abscess of axilla, left        3. Squamous cell carcinoma of skin        4. Immunosuppressed status (HCC)        5. Anemia of chronic disease        6. Stage 3b chronic kidney disease        7. History of renal transplant          Jama Altman is a 68 y.o. male with hx of afib recent watchman procedure,  CAD, renal transplant 2010 (hx of polycystic kidney disease), HLD, HTN and PAD.Admitted 3/6/2024 for nonhealing wound left axilla. He was recently diagnosed with SCCA after biopsy of left axillary mass  2/23/24. CT chest + partially visualized large left axillary mass and necrotic adenopathy consistent with known SCC.  Patchy groundglass density and subsegmental atelectasis, consistent with chronic inflammatory/infectious process. Drain placed 3/7-cultures of fluid polymicrobial + MRSA (only oral option available is Zyvox), E- faecalis,  Vito. Repeat  CT 3/11 : Irregular fluid collection in the LEFT axillary subcutaneous soft tissues measuring 8.3 x 5.1 x 11.1 cm with gas bubbles and drainage catheter in place. IR repositioned and up sized drain on 3/14.  Patient will discharge home on 3/16 with drain in place.  He will follow-up with infectious disease outpatient for drain management and ongoing antibiotics.  Ultimately, patient may require surgical intervention for necrotic tissue that is present.  Patient was discharged home on a 14-day course of p.o. Augmentin 875/125 mg twice daily and p.o. Zyvox 600 mg twice daily.  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 Discussed case with ID MD Mccoy. No other oral options available for MRSA treatment.  IV daptomycin 6 Mg/KG once daily 6-week course with possible end date 5/21/2024 with a possibility of extending      4/19/24-patient following up while on IV daptomycin 6 Mg/KG daily.  Patient reports he is still tolerating IV daptomycin with no complaints of side effects.  He denies any nausea, vomiting, fever, chills, constipation, diarrhea, muscle pains or aches.  Left axillary wound continues to decrease in size NICKI bulb has less output.  Most recent labs reviewed from 4/17/2024, WBC 6.6, stable anemia 11.7/37.1, thrombocytopenia chronic and stable 71, CMP chronically elevated creatinine 1.60 stable, decreased GFR 47 stable, LFTs WNL, CPK 75 and stable.  Continue daily infusions of IV daptomycin and follow-up with oncology and surgery.  Discussed case with oncology MD Fajardo, outside PET scan shows uptake in T5, possible metastasis versus infection.  Given that patient has been on IV daptomycin and no previous reports of  bacteremia more likely metastasis but will wait for MRI of T and L-spine which was ordered by MD Fajardo.  Left axillary NICKI bulb and extension tubing changed in office.  Continues to have decreased swelling and left axillary.  Necrotic tissue with odorous smell, no signs of worsening infection.  Serosanguineous drainage in NICKI bulb 20 mL daily.  Continue with IV daptomycin 6 Mg/KG daily with weekly labs CBC/CMP/CPK.  Ultimately would require I&D of left axillary to remove nidus of infection    - Due to his MRSA and history of renal cell transplant he is limited to only oral Zyvox for antibiotic treatment which can only be used for 2 weeks before causing worsening thrombocytopenia.  No good oral options for long-term suppression.  Recommend ultimately to have surgical intervention as necrotic tissue will become a nidus for infection.    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  Tentative end date 6 weeks 5/21/24 with the possibility of extending.  -  Recommend surgical intervention as necrotic tissue will become a nidus for infection.   -  Repeat lab work CBC/CMP/ CPK weekly   -  Medication education provided and S/S of side effects discussed   -  Recommend routine follow up with oncology and surgery  -  Continue wound care to left arm.  Dressing supplies provided to patient.  Continue keeping log of drain output, currently 20 mL a day without flushes  -  Continue po probiotic  -  Education provided on S/S of infection and when to report to ER/ call 911       Return visit: 1 month. Follow up with primary care physician for chronic medical problems      I have performed a physical exam,  updated ROS and plan today. I have reviewed previous images, labs, and provider notes.      BITA Gandhi.    All Patients should seek medical re-evaluation or report to the ER for new,  increasing or worsening symptoms. In some circumstances medical conditions can change from the initial evaluation and may require emergent medical re-evaluation. This includes but is not limited to chest pain, shortness of breath, atypical abdominal pain, atypical headache, ALOC, fever >101, low blood pressure, high respiratory rate (above 30), low oxygen saturation (below 90%), acute delirium, abnormal bleeding, inability to tolerate any intake, weakness on one side of the body, any worsened or concerning conditions.    Please note that this dictation was created using voice recognition software. I have worked with technical experts from [employer] to optimize the interface.  I have made every reasonable attempt to correct obvious errors, but there may be errors of grammar and possibly content that I did not discover before finalizing the note.

## 2024-04-22 ENCOUNTER — TELEPHONE (OUTPATIENT)
Dept: INFECTIOUS DISEASES | Facility: MEDICAL CENTER | Age: 69
End: 2024-04-22
Payer: MEDICARE

## 2024-04-22 ENCOUNTER — HOSPITAL ENCOUNTER (OUTPATIENT)
Dept: RADIOLOGY | Facility: MEDICAL CENTER | Age: 69
End: 2024-04-22
Payer: MEDICARE

## 2024-04-22 NOTE — TELEPHONE ENCOUNTER
Patient call due to leakage around bulb  Drainage a lot last night-less today  Recommended contacting surgery to see if drain can be removed  Continue frequent dressing changes-if unable to manage with dressing changes, recommend go to ER for eval  Has appt on the 24th

## 2024-04-23 ENCOUNTER — HOSPITAL ENCOUNTER (OUTPATIENT)
Dept: LAB | Facility: MEDICAL CENTER | Age: 69
End: 2024-04-23
Attending: INTERNAL MEDICINE
Payer: MEDICARE

## 2024-04-23 LAB
ALBUMIN SERPL BCP-MCNC: 3.8 G/DL (ref 3.2–4.9)
ALBUMIN SERPL BCP-MCNC: 3.8 G/DL (ref 3.2–4.9)
ALBUMIN/GLOB SERPL: 1.6 G/DL
ALP SERPL-CCNC: 68 U/L (ref 30–99)
ALT SERPL-CCNC: 14 U/L (ref 2–50)
ANION GAP SERPL CALC-SCNC: 15 MMOL/L (ref 7–16)
APPEARANCE UR: CLEAR
AST SERPL-CCNC: 17 U/L (ref 12–45)
BACTERIA #/AREA URNS HPF: NEGATIVE /HPF
BASOPHILS # BLD AUTO: 0.4 % (ref 0–1.8)
BASOPHILS # BLD AUTO: 0.6 % (ref 0–1.8)
BASOPHILS # BLD: 0.03 K/UL (ref 0–0.12)
BASOPHILS # BLD: 0.04 K/UL (ref 0–0.12)
BILIRUB SERPL-MCNC: 0.6 MG/DL (ref 0.1–1.5)
BILIRUB UR QL STRIP.AUTO: NEGATIVE
BUN SERPL-MCNC: 36 MG/DL (ref 8–22)
BUN SERPL-MCNC: 36 MG/DL (ref 8–22)
BURR CELLS BLD QL SMEAR: NORMAL
CALCIUM ALBUM COR SERPL-MCNC: 9.1 MG/DL (ref 8.5–10.5)
CALCIUM ALBUM COR SERPL-MCNC: 9.1 MG/DL (ref 8.5–10.5)
CALCIUM SERPL-MCNC: 8.9 MG/DL (ref 8.5–10.5)
CALCIUM SERPL-MCNC: 8.9 MG/DL (ref 8.5–10.5)
CHLORIDE SERPL-SCNC: 100 MMOL/L (ref 96–112)
CHLORIDE SERPL-SCNC: 100 MMOL/L (ref 96–112)
CO2 SERPL-SCNC: 22 MMOL/L (ref 20–33)
CO2 SERPL-SCNC: 23 MMOL/L (ref 20–33)
COLOR UR: YELLOW
COMMENT 1642: NORMAL
COMMENT 1642: NORMAL
CREAT SERPL-MCNC: 1.72 MG/DL (ref 0.5–1.4)
CREAT SERPL-MCNC: 1.72 MG/DL (ref 0.5–1.4)
CREAT UR-MCNC: 111.73 MG/DL
EOSINOPHIL # BLD AUTO: 0.09 K/UL (ref 0–0.51)
EOSINOPHIL # BLD AUTO: 0.11 K/UL (ref 0–0.51)
EOSINOPHIL NFR BLD: 1.3 % (ref 0–6.9)
EOSINOPHIL NFR BLD: 1.6 % (ref 0–6.9)
EPI CELLS #/AREA URNS HPF: ABNORMAL /HPF
ERYTHROCYTE [DISTWIDTH] IN BLOOD BY AUTOMATED COUNT: 47.7 FL (ref 35.9–50)
ERYTHROCYTE [DISTWIDTH] IN BLOOD BY AUTOMATED COUNT: 52.9 FL (ref 35.9–50)
GFR SERPLBLD CREATININE-BSD FMLA CKD-EPI: 43 ML/MIN/1.73 M 2
GFR SERPLBLD CREATININE-BSD FMLA CKD-EPI: 43 ML/MIN/1.73 M 2
GLOBULIN SER CALC-MCNC: 2.4 G/DL (ref 1.9–3.5)
GLUCOSE SERPL-MCNC: 166 MG/DL (ref 65–99)
GLUCOSE SERPL-MCNC: 167 MG/DL (ref 65–99)
GLUCOSE UR STRIP.AUTO-MCNC: NEGATIVE MG/DL
HBV CORE AB SERPL QL IA: NONREACTIVE
HBV SURFACE AB SERPL IA-ACNC: <3.5 MIU/ML (ref 0–10)
HBV SURFACE AG SER QL: NORMAL
HCT VFR BLD AUTO: 42.9 % (ref 42–52)
HCT VFR BLD AUTO: 47.4 % (ref 42–52)
HCV AB SER QL: NORMAL
HGB BLD-MCNC: 13.3 G/DL (ref 14–18)
HGB BLD-MCNC: 13.5 G/DL (ref 14–18)
HYALINE CASTS #/AREA URNS LPF: ABNORMAL /LPF
IMM GRANULOCYTES # BLD AUTO: 0.03 K/UL (ref 0–0.11)
IMM GRANULOCYTES # BLD AUTO: 0.04 K/UL (ref 0–0.11)
IMM GRANULOCYTES NFR BLD AUTO: 0.4 % (ref 0–0.9)
IMM GRANULOCYTES NFR BLD AUTO: 0.6 % (ref 0–0.9)
KETONES UR STRIP.AUTO-MCNC: NEGATIVE MG/DL
LEUKOCYTE ESTERASE UR QL STRIP.AUTO: ABNORMAL
LYMPHOCYTES # BLD AUTO: 0.52 K/UL (ref 1–4.8)
LYMPHOCYTES # BLD AUTO: 0.53 K/UL (ref 1–4.8)
LYMPHOCYTES NFR BLD: 7.6 % (ref 22–41)
LYMPHOCYTES NFR BLD: 7.7 % (ref 22–41)
MAGNESIUM SERPL-MCNC: 1.8 MG/DL (ref 1.5–2.5)
MCH RBC QN AUTO: 28 PG (ref 27–33)
MCH RBC QN AUTO: 28.1 PG (ref 27–33)
MCHC RBC AUTO-ENTMCNC: 28.5 G/DL (ref 32.3–36.5)
MCHC RBC AUTO-ENTMCNC: 31 G/DL (ref 32.3–36.5)
MCV RBC AUTO: 90.5 FL (ref 81.4–97.8)
MCV RBC AUTO: 98.3 FL (ref 81.4–97.8)
MICRO URNS: ABNORMAL
MICROALBUMIN UR-MCNC: 1.9 MG/DL
MICROALBUMIN/CREAT UR: 17 MG/G (ref 0–30)
MONOCYTES # BLD AUTO: 0.62 K/UL (ref 0–0.85)
MONOCYTES # BLD AUTO: 0.72 K/UL (ref 0–0.85)
MONOCYTES NFR BLD AUTO: 10.3 % (ref 0–13.4)
MONOCYTES NFR BLD AUTO: 9.2 % (ref 0–13.4)
MORPHOLOGY BLD-IMP: NORMAL
MORPHOLOGY BLD-IMP: NORMAL
NEUTROPHILS # BLD AUTO: 5.44 K/UL (ref 1.82–7.42)
NEUTROPHILS # BLD AUTO: 5.53 K/UL (ref 1.82–7.42)
NEUTROPHILS NFR BLD: 79.3 % (ref 44–72)
NEUTROPHILS NFR BLD: 81 % (ref 44–72)
NITRITE UR QL STRIP.AUTO: NEGATIVE
NRBC # BLD AUTO: 0 K/UL
NRBC # BLD AUTO: 0 K/UL
NRBC BLD-RTO: 0 /100 WBC (ref 0–0.2)
NRBC BLD-RTO: 0 /100 WBC (ref 0–0.2)
OVALOCYTES BLD QL SMEAR: NORMAL
OVALOCYTES BLD QL SMEAR: NORMAL
PH UR STRIP.AUTO: 5.5 [PH] (ref 5–8)
PHOSPHATE SERPL-MCNC: 2.1 MG/DL (ref 2.5–4.5)
PLATELET # BLD AUTO: 57 K/UL (ref 164–446)
PLATELET # BLD AUTO: 61 K/UL (ref 164–446)
PLATELET BLD QL SMEAR: NORMAL
PLATELET BLD QL SMEAR: NORMAL
PMV BLD AUTO: 13.8 FL (ref 9–12.9)
POIKILOCYTOSIS BLD QL SMEAR: NORMAL
POIKILOCYTOSIS BLD QL SMEAR: NORMAL
POTASSIUM SERPL-SCNC: 3.9 MMOL/L (ref 3.6–5.5)
POTASSIUM SERPL-SCNC: 4.1 MMOL/L (ref 3.6–5.5)
PROT SERPL-MCNC: 6.2 G/DL (ref 6–8.2)
PROT UR QL STRIP: 30 MG/DL
RBC # BLD AUTO: 4.74 M/UL (ref 4.7–6.1)
RBC # BLD AUTO: 4.82 M/UL (ref 4.7–6.1)
RBC # URNS HPF: ABNORMAL /HPF
RBC BLD AUTO: PRESENT
RBC BLD AUTO: PRESENT
RBC UR QL AUTO: NEGATIVE
SODIUM SERPL-SCNC: 137 MMOL/L (ref 135–145)
SODIUM SERPL-SCNC: 137 MMOL/L (ref 135–145)
SP GR UR STRIP.AUTO: 1.02
TARGETS BLD QL SMEAR: NORMAL
TSH SERPL DL<=0.005 MIU/L-ACNC: 1.05 UIU/ML (ref 0.38–5.33)
URATE SERPL-MCNC: 5.9 MG/DL (ref 2.5–8.3)
UROBILINOGEN UR STRIP.AUTO-MCNC: 0.2 MG/DL
WBC # BLD AUTO: 6.7 K/UL (ref 4.8–10.8)
WBC # BLD AUTO: 7 K/UL (ref 4.8–10.8)
WBC #/AREA URNS HPF: ABNORMAL /HPF

## 2024-04-23 PROCEDURE — 87077 CULTURE AEROBIC IDENTIFY: CPT

## 2024-04-23 PROCEDURE — 86704 HEP B CORE ANTIBODY TOTAL: CPT

## 2024-04-23 PROCEDURE — 87086 URINE CULTURE/COLONY COUNT: CPT

## 2024-04-23 PROCEDURE — 84550 ASSAY OF BLOOD/URIC ACID: CPT

## 2024-04-23 PROCEDURE — 87340 HEPATITIS B SURFACE AG IA: CPT

## 2024-04-23 PROCEDURE — 86803 HEPATITIS C AB TEST: CPT

## 2024-04-23 PROCEDURE — 80053 COMPREHEN METABOLIC PANEL: CPT

## 2024-04-23 PROCEDURE — 85025 COMPLETE CBC W/AUTO DIFF WBC: CPT | Mod: 91

## 2024-04-23 PROCEDURE — 84443 ASSAY THYROID STIM HORMONE: CPT

## 2024-04-23 PROCEDURE — 80069 RENAL FUNCTION PANEL: CPT

## 2024-04-23 PROCEDURE — 82043 UR ALBUMIN QUANTITATIVE: CPT

## 2024-04-23 PROCEDURE — 86706 HEP B SURFACE ANTIBODY: CPT

## 2024-04-23 PROCEDURE — 36415 COLL VENOUS BLD VENIPUNCTURE: CPT

## 2024-04-23 PROCEDURE — 81001 URINALYSIS AUTO W/SCOPE: CPT

## 2024-04-23 PROCEDURE — 85025 COMPLETE CBC W/AUTO DIFF WBC: CPT

## 2024-04-23 PROCEDURE — 87186 SC STD MICRODIL/AGAR DIL: CPT

## 2024-04-23 PROCEDURE — 82570 ASSAY OF URINE CREATININE: CPT

## 2024-04-23 PROCEDURE — 83735 ASSAY OF MAGNESIUM: CPT

## 2024-04-23 PROCEDURE — 80195 ASSAY OF SIROLIMUS: CPT

## 2024-04-23 ASSESSMENT — ENCOUNTER SYMPTOMS
BACK PAIN: 0
COUGH: 0
VOMITING: 0
FEVER: 0
FOCAL WEAKNESS: 0
NERVOUS/ANXIOUS: 0
ABDOMINAL PAIN: 0
CONSTIPATION: 0
HEADACHES: 0
DIARRHEA: 0
BRUISES/BLEEDS EASILY: 0
WHEEZING: 0
SPUTUM PRODUCTION: 0
DIZZINESS: 0
PALPITATIONS: 0
NAUSEA: 0
CHILLS: 0
WEIGHT LOSS: 0
NECK PAIN: 0
MYALGIAS: 0
DOUBLE VISION: 0
BLURRED VISION: 0
SHORTNESS OF BREATH: 0

## 2024-04-24 ENCOUNTER — HOSPITAL ENCOUNTER (OUTPATIENT)
Facility: MEDICAL CENTER | Age: 69
End: 2024-04-24
Attending: NURSE PRACTITIONER
Payer: MEDICARE

## 2024-04-24 ENCOUNTER — OFFICE VISIT (OUTPATIENT)
Dept: INFECTIOUS DISEASES | Facility: MEDICAL CENTER | Age: 69
End: 2024-04-24
Attending: NURSE PRACTITIONER
Payer: MEDICARE

## 2024-04-24 VITALS
BODY MASS INDEX: 27.39 KG/M2 | TEMPERATURE: 98.6 F | HEIGHT: 77 IN | OXYGEN SATURATION: 90 % | HEART RATE: 73 BPM | SYSTOLIC BLOOD PRESSURE: 94 MMHG | DIASTOLIC BLOOD PRESSURE: 60 MMHG | WEIGHT: 232 LBS | RESPIRATION RATE: 16 BRPM

## 2024-04-24 DIAGNOSIS — Z94.0 HISTORY OF RENAL TRANSPLANT: ICD-10-CM

## 2024-04-24 DIAGNOSIS — N18.32 STAGE 3B CHRONIC KIDNEY DISEASE: ICD-10-CM

## 2024-04-24 DIAGNOSIS — D84.9 IMMUNOSUPPRESSED STATUS (HCC): ICD-10-CM

## 2024-04-24 DIAGNOSIS — C44.92 SQUAMOUS CELL CARCINOMA OF SKIN: ICD-10-CM

## 2024-04-24 DIAGNOSIS — D63.8 ANEMIA OF CHRONIC DISEASE: ICD-10-CM

## 2024-04-24 DIAGNOSIS — L02.412 ABSCESS OF AXILLA, LEFT: ICD-10-CM

## 2024-04-24 DIAGNOSIS — A49.9 POLYMICROBIAL BACTERIAL INFECTION: ICD-10-CM

## 2024-04-24 LAB
ALBUMIN SERPL BCP-MCNC: 3.6 G/DL (ref 3.2–4.9)
ALBUMIN/GLOB SERPL: 1.6 G/DL
ALP SERPL-CCNC: 66 U/L (ref 30–99)
ALT SERPL-CCNC: 16 U/L (ref 2–50)
ANION GAP SERPL CALC-SCNC: 17 MMOL/L (ref 7–16)
AST SERPL-CCNC: 17 U/L (ref 12–45)
BILIRUB SERPL-MCNC: 0.4 MG/DL (ref 0.1–1.5)
BUN SERPL-MCNC: 45 MG/DL (ref 8–22)
CALCIUM ALBUM COR SERPL-MCNC: 8.7 MG/DL (ref 8.5–10.5)
CALCIUM SERPL-MCNC: 8.4 MG/DL (ref 8.5–10.5)
CHLORIDE SERPL-SCNC: 99 MMOL/L (ref 96–112)
CK SERPL-CCNC: 166 U/L (ref 0–154)
CO2 SERPL-SCNC: 17 MMOL/L (ref 20–33)
CREAT SERPL-MCNC: 2.17 MG/DL (ref 0.5–1.4)
CRP SERPL HS-MCNC: 13.64 MG/DL (ref 0–0.75)
ERYTHROCYTE [DISTWIDTH] IN BLOOD BY AUTOMATED COUNT: 45.1 FL (ref 35.9–50)
GFR SERPLBLD CREATININE-BSD FMLA CKD-EPI: 32 ML/MIN/1.73 M 2
GLOBULIN SER CALC-MCNC: 2.2 G/DL (ref 1.9–3.5)
GLUCOSE SERPL-MCNC: 556 MG/DL (ref 65–99)
HCT VFR BLD AUTO: 35.9 % (ref 42–52)
HGB BLD-MCNC: 11.1 G/DL (ref 14–18)
MCH RBC QN AUTO: 27.4 PG (ref 27–33)
MCHC RBC AUTO-ENTMCNC: 30.9 G/DL (ref 32.3–36.5)
MCV RBC AUTO: 88.6 FL (ref 81.4–97.8)
PLATELET # BLD AUTO: 59 K/UL (ref 164–446)
PLATELETS.RETICULATED NFR BLD AUTO: 14.3 % (ref 0.6–13.1)
POTASSIUM SERPL-SCNC: 5 MMOL/L (ref 3.6–5.5)
PROT SERPL-MCNC: 5.8 G/DL (ref 6–8.2)
RBC # BLD AUTO: 4.05 M/UL (ref 4.7–6.1)
SODIUM SERPL-SCNC: 133 MMOL/L (ref 135–145)
WBC # BLD AUTO: 8.8 K/UL (ref 4.8–10.8)

## 2024-04-24 PROCEDURE — 99214 OFFICE O/P EST MOD 30 MIN: CPT | Performed by: NURSE PRACTITIONER

## 2024-04-24 PROCEDURE — 85027 COMPLETE CBC AUTOMATED: CPT

## 2024-04-24 PROCEDURE — 82550 ASSAY OF CK (CPK): CPT

## 2024-04-24 PROCEDURE — 85055 RETICULATED PLATELET ASSAY: CPT

## 2024-04-24 PROCEDURE — 86140 C-REACTIVE PROTEIN: CPT

## 2024-04-24 PROCEDURE — 99212 OFFICE O/P EST SF 10 MIN: CPT | Performed by: NURSE PRACTITIONER

## 2024-04-24 PROCEDURE — 80053 COMPREHEN METABOLIC PANEL: CPT

## 2024-04-24 PROCEDURE — 3078F DIAST BP <80 MM HG: CPT | Performed by: NURSE PRACTITIONER

## 2024-04-24 PROCEDURE — 85652 RBC SED RATE AUTOMATED: CPT

## 2024-04-24 PROCEDURE — 3074F SYST BP LT 130 MM HG: CPT | Performed by: NURSE PRACTITIONER

## 2024-04-24 ASSESSMENT — FIBROSIS 4 INDEX: FIB4 SCORE: 4.99

## 2024-04-24 NOTE — PROGRESS NOTES
INFECTIOUS  DISEASE  OUTPATIENT CLINIC  NOTE   Subjective   Primary care provider: Ganesh Benton III, M.D..     Reason for Follow Up:   Follow-up for   1. Polymicrobial bacterial infection        2. Abscess of axilla, left  CT-CHEST (THORAX) WITH    Referral to Wound Clinic      3. Squamous cell carcinoma of skin        4. Immunosuppressed status (HCC)        5. Anemia of chronic disease        6. Stage 3b chronic kidney disease        7. History of renal transplant          HPI: Patient previously seen and treated by ID team as inpatient during hospital admission.   Jama Altman is a 68 y.o. male with hx of afib recent watchman procedure,  CAD, renal transplant 2010 (hx of polycystic kidney disease), HLD, HTN and PAD.Admitted 3/6/2024 for nonhealing wound left axilla. He was recently diagnosed with SCCA after biopsy of left axillary mass  2/23/24. CT chest + partially visualized large left axillary mass and necrotic adenopathy consistent with known SCC.  Patchy groundglass density and subsegmental atelectasis, consistent with chronic inflammatory/infectious process. Drain placed 3/7-cultures of fluid polymicrobial + MRSA (only oral option available is Zyvox), E- faecalis, Fingoldia. Repeat  CT 3/11 : Irregular fluid collection in the LEFT axillary subcutaneous soft tissues measuring 8.3 x 5.1 x 11.1 cm with gas bubbles and drainage catheter in place. IR repositioned and up sized drain on 3/14.  Patient will discharge home on 3/16 with drain in place.  He will follow-up with infectious disease outpatient for drain management and ongoing antibiotics.  Ultimately, patient may require surgical intervention for necrotic tissue that is present.  Patient was discharged home on a 14-day course of p.o. Augmentin 875/125 mg twice daily and p.o. Zyvox 600 mg twice daily.     03/22/24-today the patient reports that he has had having nausea while on both Augmentin and Zyvox.  Due to limited antibiotic options recommend  spacing antibiotics by 2 hours and continue taking probiotic to see if he has any relief in symptoms.  He does have as needed Zofran available.  He denies any vomiting, fever, chills, diarrhea.  Left axillary abscess/tumor site with continued drainage.  Patient is keeping a log of drainage output which is slowing compared to hospital admission.  Continues to have 30 to 50 mL of output a day.  We discussed antibiotic treatment options.  Due to his MRSA and history of renal cell transplant he is limited to only oral Zyvox for antibiotic treatment which can only be used for 2 weeks before causing worsening thrombocytopenia.  Patient already has thrombocytopenia 137.  Augmentin is covering the E faecalis and Feingoldia.  Recommended to finish 14-day course of antibiotic therapy which ends on 3/30/24 as there is no good oral options for long-term suppression.  Due to the patient having a drain in place with high output will hopefully prevent worsening infection.  Recommend ultimately to have surgical intervention as necrotic tissue will become a nidus for infection.  Education provided on signs and symptoms of worsening infection and when to report to ER/call 911.  Repeat lab work CBC/CMP in 1 week for close monitoring.  Previous lab work reviewed and discussed with patient from 3/15/2024, WBC 6.6, stable anemia, thrombocytopenia 133, chronic history of elevated creatinine 2.0 and decreased GFR 36 stable.     4/2/24-patient following up after completion of Augmentin and Zyvox. He denies any vomiting, fever, chills, diarrhea. Most recent labs from 3/29/2024, WBC 7.7, anemia worsening 11.9/38.6, thrombocytopenia 48.  Patient was stopped on Zyvox as he completed antibiotic therapy that day.  CMP with creatinine continuing to improve 1.91and GFR slowly improving 38. Pending repeat labs CMP for today. Drain continues to have 20-50 ml of serosanguinous outpatient a day. Pending start date for immunotherapy. Due to concerns of  nidus of infection, unable to have I&D due to squamous cell carcinoma, and pending start date for immunotherapy, will start patient on an indefinite course of IV daptomycin 6 mg/kg ( renal dosed) once daily until surgical plan can be established or repeat CT scan shows resolution of abscess. Discussed case with ID MD Mccoy. No other oral options available for MRSA treatment. Will continue  with PO Augmentin 875/125 mg 1 tab twice daily as well for coverage of E- faecalis, Fingoldia. Orders placed fr CBC/CMP/INR, PICC placement and home infusions.  After daptomycin started will stop Augmentin as Daptomycin will cover E- faecalis, MRSA, and Finegoldia    4/10/24-patient following up while on p.o. Augmentin 875/125 mg twice daily and IV daptomycin 6 Mg/KG once daily with no end date set due to extent of infection.  Stop Augmentin as daptomycin has been shown to have activity against Finegoldia magna. As previously discussed above there is concerns of nidus of infection but patient was unable to have I&D due to, cell carcinoma.  Left axillary wound continues to have drainage in NICKI bulb.  Decreasing erythema and swelling of left axillary wound. Labs reviewed from 4/8/2024, WBC 6.0, stable anemia 11.3/36.4, neutrophils WNL, CMP chronic but elevated creatinine 1.57 trending down, decreased GFR 48 stable, LFTs WNL.  CPK yet to be drawn.  Patient reports he has been tolerating both antibiotics with no complaints of side effects.  He denies any nausea, vomiting, fever, chills, constipation, diarrhea, muscle pains or aches, cramping.  Continue follow-up with oncology and surgery.  Recommended to continue probiotic.      4/19/24-patient following up while on IV daptomycin 6 Mg/KG daily.  Patient reports he is still tolerating IV daptomycin with no complaints of side effects.  He denies any nausea, vomiting, fever, chills, constipation, diarrhea, muscle pains or aches.  Left axillary wound continues to decrease in size NICKI  bulb has less output.  Most recent labs reviewed from 4/17/2024, WBC 6.6, stable anemia 11.7/37.1, thrombocytopenia chronic and stable 71, CMP chronically elevated creatinine 1.60 stable, decreased GFR 47 stable, LFTs WNL, CPK 75 and stable.  Continue daily infusions of IV daptomycin and follow-up with oncology and surgery.  Discussed case with oncology MD Fajardo, outside PET scan shows uptake in T5, possible metastasis versus infection.  Given that patient has been on IV daptomycin and no previous reports of bacteremia more likely metastasis but will wait for MRI of T and L-spine which was ordered by MD Fajardo.  Left axillary NICKI bulb and extension tubing changed in office.  Continues to have decreased swelling and left axillary.  Necrotic tissue with odorous smell, no signs of worsening infection.  Serosanguineous drainage in NICKI bulb 20 mL daily.  Continue with IV daptomycin 6 Mg/KG daily with weekly labs CBC/CMP/CPK.  Ultimately would require I&D of left axillary to remove nidus of infection    4/24/24-patient comes into the clinic today with complaints that his left axillary NICKI drain is having hardly any fluid output but he is having significant drainage from around tube and from wound site.  He does report increasing fatigue and increasing swelling in the left axillary.  He denies any nausea, vomiting, fever, chills, diarrhea, muscle pains or aches.  Repeat CT scan chest with contrast for a left evaluation of drain site.  Possibly may remove drain site if abscess is resolved.  Wound with increasing tissue slough edge and drainage, serosanguineous drainage.  Stat referral placed to wound clinic.  Will decrease dose of IV daptomycin to 4 Mg/KG once daily.  Previous lab work reviewed from 4/23/2024, WBC 7.0, anemia resolved, thrombocytopenia 61.  Possibly due to daptomycin so outpatient hold dose for the next x 2 days then restart daptomycin at 4 Mg/KG.  Creatinine chronically elevated 1.72 and stable, decreased GFR  43 and stable, CPK 75 stable.  Wound dressing supplies provided to patient.        Current Antimicrobials: IV daptomycin 6 mg/kg once daily indefinitely until CT scan shows resolution of abscess or surgical intervention  is performed  Previous Antimicrobials: Unasyn, Augmentin, Zyvox, Augmentin    Other Current Medications:  Home Medications    Medication Sig Taking? Last Dose Authorizing Provider   omeprazole (PRILOSEC) 20 MG delayed-release capsule Take 1 Capsule by mouth every day. Indications: Heartburn Yes Taking Ganesh Benton III, M.D.   glyBURIDE (DIABETA) 5 MG Tab TAKE 2 TABLETS BY MOUTH TWICE DAILY WITH MEALS Yes Taking Ganesh Benton III, M.D.   gabapentin (NEURONTIN) 400 MG Cap Take 1 Capsule by mouth 2 times a day. Yes Taking Spring oRmero M.D.   tacrolimus (PROGRAF) 0.5 MG Cap Take 1 Capsule by mouth every evening.  Patient taking differently: Take 0.5 mg by mouth every evening. one in the morning and half at night  Indications: kidney transplant Yes Taking Spring Romero M.D.   sodium bicarbonate (SODIUM BICARBONATE) 650 MG Tab Take 1 Tablet by mouth 2 (two) times a day. Yes Taking Spring Romero M.D.   metoprolol SR (TOPROL XL) 25 MG TABLET SR 24 HR Take 1 Tablet by mouth every day. May take additional one tab daily for heart rate sustaining >110 BPM. Yes Taking JERRICA CaedtP.RCOLETTE   tadalafil (CIALIS) 5 MG tablet : TAKE 1 TABLETS 30 MINUTES BEFORE SEXUAL ACTIVITY, DO NOT EXCEED MORE THAN ONE DOSE A DAY Yes Taking Ganesh Benton III, M.D.   aspirin 81 MG EC tablet Take 1 Tablet by mouth every day. Yes Taking JERRICA SpearsP.RCOLETTE   pioglitazone (ACTOS) 45 MG Tab TAKE ONE TABLET BY MOUTH ONCE DAILY Yes Taking Ganesh Benton III, M.D.   linagliptin (TRADJENTA) 5 MG Tab tablet Take 1 Tablet by mouth every day. Yes Taking Ganesh Benton III, M.D.   predniSONE (DELTASONE) 5 MG Tab Take 1 Tablet by mouth every day. Yes Taking Rich Bolton M.D.   atorvastatin (LIPITOR) 80 MG tablet  Take 1 Tablet by mouth at bedtime.  Patient not taking: Reported on 4/19/2024  Not Taking Ganesh Benton III, M.D.        PMH:  Past Medical History:   Diagnosis Date    A-fib (HCC)     Arrhythmia     Benign essential hypertension     Diabetes (HCC)     type 2    Heart burn     Hemorrhagic disorder (HCC)     Eliquis    Hyperlipoproteinemia     Hypertension     not on meds anymore    Indigestion     Myocardial infarct (HCC) 2013    stent    Polycystic kidney 09/10/2010    RIGHT KIDNEY TRANSPLANT    Presence of Watchman left atrial appendage closure device 02/29/2024    Sleep apnea 01/30/2024    states he was told he had sleep apnea but has not had a sleep study    Snoring      Past Surgical History:   Procedure Laterality Date    STENT PLACEMENT  2013    cardiac    KNEE MANIPULATION  02/16/2012    Performed by LATOYA CONNER at SURGERY [facility] ORS    KNEE UNICOMPARTMENTAL  12/23/2011    Performed by LATOYA CONNER at SURGERY [facility] ORS    KNEE ARTHROSCOPY  12/23/2011    Performed by LATOYA CONNER at SURGERY [facility] ORS    KNEE ARTHROSCOPY  05/03/2011    Performed by HANANE GOLDMAN at SURGERY SAME DAY [facility] ORS    MENISCECTOMY, KNEE, MEDIAL  05/03/2011    Performed by HANANE GOLDMAN at SURGERY [facility] DAY [facility] ORS    OTHER  09/10/2010    RIGHT KIDNEY TRANSPLANT    KNEE ARTHROPLASTY TOTAL  01/12/2007    RIGHT    KNEE ARTHROSCOPY  04/10/2006    RIGHT    OTHER ORTHOPEDIC SURGERY  07/08/1974    LEFT KNEE DEBRIDEMENT     No family history on file.  Social History     Socioeconomic History    Marital status: Single     Spouse name: Not on file    Number of children: Not on file    Years of education: Not on file    Highest education level: Not on file   Occupational History    Not on file   Tobacco Use    Smoking status: Never     Passive exposure: Never    Smokeless tobacco: Never   Vaping Use    Vaping Use: Never used   Substance and Sexual Activity    Alcohol use: No    Drug use: No    Sexual  "activity: Yes     Partners: Female   Other Topics Concern    Not on file   Social History Narrative    Not on file     Social Determinants of Health     Financial Resource Strain: Not on file   Food Insecurity: Not on file   Transportation Needs: Not on file   Physical Activity: Not on file   Stress: Not on file   Social Connections: Not on file   Intimate Partner Violence: Not on file   Housing Stability: Not on file           Allergies/Intolerances:  Allergies   Allergen Reactions    Doxycycline Rash     Sweats and shakes: 9/28/17: Clarified allergy with patient. Allergy was in 1998 and he doesn't remember what happened. He thought the medication is for pain.  Tolerates doxycycline 9/2017       ROS:   Review of Systems   Constitutional:  Positive for malaise/fatigue. Negative for chills, fever and weight loss.   HENT:  Negative for congestion and hearing loss.    Eyes:  Negative for blurred vision and double vision.   Respiratory:  Negative for cough, sputum production, shortness of breath and wheezing.    Cardiovascular:  Negative for chest pain and palpitations.   Gastrointestinal:  Negative for abdominal pain, constipation, diarrhea, nausea and vomiting.   Genitourinary:  Negative for dysuria.   Musculoskeletal:  Negative for back pain, joint pain, myalgias and neck pain.   Skin:  Negative for itching and rash.   Neurological:  Negative for dizziness, focal weakness and headaches.   Endo/Heme/Allergies:  Does not bruise/bleed easily.   Psychiatric/Behavioral:  The patient is not nervous/anxious.       ROS was reviewed and were negative except as above.    Objective    Most Recent Vital Signs:  BP 94/60 (BP Location: Right arm, Patient Position: Sitting, BP Cuff Size: Adult)   Pulse 73   Temp 37 °C (98.6 °F) (Temporal)   Resp 16   Ht 1.956 m (6' 5\")   Wt 105 kg (232 lb)   SpO2 90%   BMI 27.51 kg/m²     Physical Exam:  Physical Exam  Vitals reviewed.   Constitutional:       Appearance: Normal appearance. "   HENT:      Head: Normocephalic and atraumatic.      Nose: Nose normal.      Mouth/Throat:      Mouth: Mucous membranes are moist.   Eyes:      Pupils: Pupils are equal, round, and reactive to light.   Cardiovascular:      Rate and Rhythm: Normal rate and regular rhythm.   Pulmonary:      Effort: Pulmonary effort is normal.      Breath sounds: Normal breath sounds.   Abdominal:      Palpations: Abdomen is soft.   Musculoskeletal:         General: No tenderness.      Cervical back: Normal range of motion and neck supple.      Comments: Left axillary carcinoma.  Deterioration of tissue with increasing slough.  Serosanguineous drainage.  Mild skin irritation surrounding wound site possibly due to frequent dressing changes    Skin:     General: Skin is warm and dry.      Coloration: Skin is not jaundiced.      Findings: No erythema or rash.      Comments: Left arm edema +1   Neurological:      Mental Status: He is alert and oriented to person, place, and time.      Motor: No weakness.   Psychiatric:         Mood and Affect: Mood normal.         Behavior: Behavior normal.          Pertinent Lab/Imaging Results:  [unfilled]  @CMP@  WBC   Date/Time Value Ref Range Status   04/23/2024 08:21 AM 7.0 4.8 - 10.8 K/uL Final     RBC   Date/Time Value Ref Range Status   04/23/2024 08:21 AM 4.82 4.70 - 6.10 M/uL Final     Hemoglobin   Date/Time Value Ref Range Status   04/23/2024 08:21 AM 13.5 (L) 14.0 - 18.0 g/dL Final     Hematocrit   Date/Time Value Ref Range Status   04/23/2024 08:21 AM 47.4 42.0 - 52.0 % Final     MCV   Date/Time Value Ref Range Status   04/23/2024 08:21 AM 98.3 (H) 81.4 - 97.8 fL Final     MCH   Date/Time Value Ref Range Status   04/23/2024 08:21 AM 28.0 27.0 - 33.0 pg Final     MCHC   Date/Time Value Ref Range Status   04/23/2024 08:21 AM 28.5 (L) 32.3 - 36.5 g/dL Final     Comment:     Please note new reference range effective 05/22/2023.     MPV   Date/Time Value Ref Range Status   04/23/2024 08:21 AM 13.8  (H) 9.0 - 12.9 fL Final      Sodium   Date/Time Value Ref Range Status   04/23/2024 08:21  135 - 145 mmol/L Final     Potassium   Date/Time Value Ref Range Status   04/23/2024 08:21 AM 3.9 3.6 - 5.5 mmol/L Final     Chloride   Date/Time Value Ref Range Status   04/23/2024 08:21  96 - 112 mmol/L Final     Co2   Date/Time Value Ref Range Status   04/23/2024 08:21 AM 23 20 - 33 mmol/L Final     Glucose   Date/Time Value Ref Range Status   04/23/2024 08:21  (H) 65 - 99 mg/dL Final     Bun   Date/Time Value Ref Range Status   04/23/2024 08:21 AM 36 (H) 8 - 22 mg/dL Final     Creatinine   Date/Time Value Ref Range Status   04/23/2024 08:21 AM 1.72 (H) 0.50 - 1.40 mg/dL Final   12/18/2006 06:20 AM 2.4 (H) 0.5 - 1.4 mg/dL Final     Alkaline Phosphatase   Date/Time Value Ref Range Status   04/23/2024 08:19 AM 68 30 - 99 U/L Final     AST(SGOT)   Date/Time Value Ref Range Status   04/23/2024 08:19 AM 17 12 - 45 U/L Final     ALT(SGPT)   Date/Time Value Ref Range Status   04/23/2024 08:19 AM 14 2 - 50 U/L Final     Total Bilirubin   Date/Time Value Ref Range Status   04/23/2024 08:19 AM 0.6 0.1 - 1.5 mg/dL Final      CPK Total   Date/Time Value Ref Range Status   04/17/2024 03:30 PM 75 0 - 154 U/L Final      Recent Labs     04/23/24  0819   ASTSGOT 17   ALTSGPT 14   TBILIRUBIN 0.6   ALKPHOSPHAT 68   GLOBULIN 2.4     Blood Culture   Date Value Ref Range Status   09/27/2017 No growth after 5 days of incubation.  Final     Blood Culture Hold   Date Value Ref Range Status   09/27/2017 Collected  Final       Blood Culture   Date Value Ref Range Status   09/27/2017 No growth after 5 days of incubation.  Final     Blood Culture Hold   Date Value Ref Range Status   09/27/2017 Collected  Final          CULTURE WOUND W/ GRAM STAIN  Order: [id number] - Reflex for Order [id number]   Status: Edited Result - FINAL       Visible to patient: Yes (not seen)       Next appt: 03/29/2024 at 03:00 PM in Infectious Diseases Janessa  Shaila A.P.R.N.)    Specimen Information: Wound   0 Result Notes      Component 2 wk ago   Significant Indicator POS Positive (POS)   Source WND   Site L Axillary Fluid   Culture Result - Abnormal    Gram Stain Result Rare WBCs.  Moderate Gram positive cocci.   Culture Result  Abnormal   Methicillin Resistant Staphylococcus aureus  Heavy growth  This isolate is presumed to be clindamycin resistant based on  detection of inducible resistance.    Culture Result  Abnormal   Enterococcus faecalis  Heavy growth    Resulting Agency M        Susceptibility     Methicillin resistant staphylococcus aureus Enterococcus faecalis     CESIA CESIA     Ampicillin   <=2 mcg/mL Sensitive     Ampicillin/sulbactam <=8/4 mcg/mL Resistant       Cefazolin <=8 mcg/mL Resistant       Cefepime 8 mcg/mL Resistant       Clindamycin 0.5 mcg/mL Resistant       Daptomycin <=0.5 mcg/mL Sensitive 2 mcg/mL Sensitive     Erythromycin >4 mcg/mL Resistant       Gent Synergy   <=500 mcg/mL Sensitive     Linezolid 2 mcg/mL Sensitive 2 mcg/mL Sensitive     Oxacillin >2 mcg/mL Resistant       Penicillin   2 mcg/mL Sensitive     Tetracycline >8 mcg/mL Resistant >8 mcg/mL Resistant     Trimeth/Sulfa <=0.5/9.5 m... Sensitive       Vancomycin 1 mcg/mL Sensitive 1 mcg/mL Sensitive                    Narrative  Performed by: M  Left axillary fluid aspirate  Left axillary fluid aspirate      Specimen Collected: 03/07/24 12:06 PM Last Resulted: 03/10/24  8:46 AM           CULTURE WOUND W/ GRAM STAIN  Order: [id number] - Reflex for Order [id number]   Status: Edited Result - FINAL       Visible to patient: Yes (not seen)       Next appt: 03/29/2024 at 03:00 PM in Infectious Diseases (Ovidio Linton A.P.R.N.)    Specimen Information: Wound   0 Result Notes      Component 2 wk ago   Significant Indicator POS Positive (POS)   Source WND   Site L Axillary Fluid   Culture Result - Abnormal    Gram Stain Result Rare WBCs.  Moderate Gram positive cocci.   Culture Result   Abnormal   Methicillin Resistant Staphylococcus aureus  Heavy growth  This isolate is presumed to be clindamycin resistant based on  detection of inducible resistance.    Culture Result  Abnormal   Enterococcus faecalis  Heavy growth    Resulting Agency M        Susceptibility     Methicillin resistant staphylococcus aureus Enterococcus faecalis     CESIA CESIA     Ampicillin   <=2 mcg/mL Sensitive     Ampicillin/sulbactam <=8/4 mcg/mL Resistant       Cefazolin <=8 mcg/mL Resistant       Cefepime 8 mcg/mL Resistant       Clindamycin 0.5 mcg/mL Resistant       Daptomycin <=0.5 mcg/mL Sensitive 2 mcg/mL Sensitive     Erythromycin >4 mcg/mL Resistant       Gent Synergy   <=500 mcg/mL Sensitive     Linezolid 2 mcg/mL Sensitive 2 mcg/mL Sensitive     Oxacillin >2 mcg/mL Resistant       Penicillin   2 mcg/mL Sensitive     Tetracycline >8 mcg/mL Resistant >8 mcg/mL Resistant     Trimeth/Sulfa <=0.5/9.5 m... Sensitive       Vancomycin 1 mcg/mL Sensitive 1 mcg/mL Sensitive                    Narrative  Performed by: M  Left axillary fluid aspirate  Left axillary fluid aspirate      Specimen Collected: 03/07/24 12:06 PM Last Resulted: 03/10/24  8:46 AM        Lab Flowsheet        Order Details        View Encounter        Lab and Collection Details        Routing        Result History     View All Conversations on this Encounter           Result Care Coordination      Patient Communication     Add Comments   Not seen Back to Top          Contains abnormal data Anaerobic Culture  Order: [id number] - Reflex for Order [id number]   Status: Final result         Visible to patient: Yes (not seen)         Next appt: 03/29/2024 at 03:00 PM in Infectious Diseases (Ovidio Linton A.P.R.N.)      Specimen Information: Wound   0 Result Notes          Component 2 wk ago   Significant Indicator POS Positive (POS)   Source WND   Site L Axillary Fluid   Culture Result - Abnormal    Culture Result  Abnormal   Finegoldia magna  Heavy growth     Resulting Agency M              Narrative  Performed by: M  Left axillary fluid aspirate  Left axillary fluid aspirate      Specimen Collected: 03/07/24 12:06 PM Last Resulted: 03/10/24  1:58 PM                Impression/Assessment      1. Polymicrobial bacterial infection        2. Abscess of axilla, left  CT-CHEST (THORAX) WITH    Referral to Wound Clinic      3. Squamous cell carcinoma of skin        4. Immunosuppressed status (HCC)        5. Anemia of chronic disease        6. Stage 3b chronic kidney disease        7. History of renal transplant            Jama Altman is a 68 y.o. male with hx of afib recent watchman procedure,  CAD, renal transplant 2010 (hx of polycystic kidney disease), HLD, HTN and PAD.Admitted 3/6/2024 for nonhealing wound left axilla. He was recently diagnosed with SCCA after biopsy of left axillary mass  2/23/24. CT chest + partially visualized large left axillary mass and necrotic adenopathy consistent with known SCC.  Patchy groundglass density and subsegmental atelectasis, consistent with chronic inflammatory/infectious process. Drain placed 3/7-cultures of fluid polymicrobial + MRSA (only oral option available is Zyvox), E- faecalis, Fingoldia. Repeat  CT 3/11 : Irregular fluid collection in the LEFT axillary subcutaneous soft tissues measuring 8.3 x 5.1 x 11.1 cm with gas bubbles and drainage catheter in place. IR repositioned and up sized drain on 3/14.  Patient will discharge home on 3/16 with drain in place.  He will follow-up with infectious disease outpatient for drain management and ongoing antibiotics.  Ultimately, patient may require surgical intervention for necrotic tissue that is present.  Patient was discharged home on a 14-day course of p.o. Augmentin 875/125 mg twice daily and p.o. Zyvox 600 mg twice daily.  Due to concerns of nidus of infection, unable to have I&D due to squamous cell carcinoma, and pending start date for immunotherapy, will start patient on an  indefinite course of IV daptomycin 6 mg/kg ( renal dosed) once daily until surgical plan can be established or repeat CT scan shows resolution of abscess. Discussed case with ID MD Mccoy. No other oral options available for MRSA treatment.  IV daptomycin 6 Mg/KG once daily 6-week course with possible end date 5/21/2024 with a possibility of extending    4/24/24-patient comes into the clinic today with complaints that his left axillary NICKI drain is having hardly any fluid output but he is having significant drainage from around tube and from wound site.  He does report increasing fatigue and increasing swelling in the left axillary.  He denies any nausea, vomiting, fever, chills, diarrhea, muscle pains or aches.  Repeat CT scan chest with contrast for a left evaluation of drain site.  Possibly may remove drain site if abscess is resolved.  Wound with increasing tissue slough edge and drainage, serosanguineous drainage.  Stat referral placed to wound clinic.  Will decrease dose of IV daptomycin to 4 Mg/KG once daily.  Previous lab work reviewed from 4/23/2024, WBC 7.0, anemia resolved, thrombocytopenia 61.  Possibly due to daptomycin so outpatient hold dose for the next x 2 days then restart daptomycin at 4 Mg/KG.  Creatinine chronically elevated 1.72 and stable, decreased GFR 43 and stable, CPK 75 stable.  Wound dressing supplies provided to patient.    - Due to his MRSA and history of renal cell transplant he is limited to only oral Zyvox for antibiotic treatment which can only be used for 2 weeks before causing worsening thrombocytopenia.  No good oral options for long-term suppression.  Recommend ultimately to have surgical intervention as necrotic tissue will become a nidus for infection.    EKG QTc- 453 from 3/6/24    daptomycin has been shown to have activity against Finegoldia magna  PLAN:   - Decrease  IV daptomycin to 4 Mg/KG once daily with an indefinite course.  Tentative end date 6 weeks 5/21/24 with  the possibility of extending.  - Recommend surgical intervention as necrotic tissue will become a nidus for infection.   - Repeat lab work CBC/CMP/ CPK weekly   -  Repeat CT chest with contrast for evaluation of left axillary abscess  -  Stat referral placed to wound clinic.  Squamous cell carcinoma with increasing drainage from left axillary  -  Medication education provided and S/S of side effects discussed   -  Recommend routine follow up with oncology and surgery  -  Continue wound care to left arm.  Dressing supplies provided to patient.  Continue keeping log of drain output, currently 20 mL a day without flushes  -  Continue po probiotic  -  Education provided on S/S of infection and when to report to ER/ call 911       Return visit:2 weeks. Follow up with primary care physician for chronic medical problems      I have performed a physical exam,  updated ROS and plan today. I have reviewed previous images, labs, and provider notes.      BITA Gandhi.    All Patients should seek medical re-evaluation or report to the ER for new, increasing or worsening symptoms. In some circumstances medical conditions can change from the initial evaluation and may require emergent medical re-evaluation. This includes but is not limited to chest pain, shortness of breath, atypical abdominal pain, atypical headache, ALOC, fever >101, low blood pressure, high respiratory rate (above 30), low oxygen saturation (below 90%), acute delirium, abnormal bleeding, inability to tolerate any intake, weakness on one side of the body, any worsened or concerning conditions.    Please note that this dictation was created using voice recognition software. I have worked with technical experts from [employer]  [employer] to optimize the interface.  I have made every reasonable attempt to correct obvious errors, but there may be errors of grammar and possibly content that I did not discover before finalizing the note.

## 2024-04-25 ENCOUNTER — HOSPITAL ENCOUNTER (OUTPATIENT)
Dept: LAB | Facility: MEDICAL CENTER | Age: 69
End: 2024-04-25
Attending: NURSE PRACTITIONER
Payer: MEDICARE

## 2024-04-25 ENCOUNTER — TELEPHONE (OUTPATIENT)
Dept: INFECTIOUS DISEASES | Facility: MEDICAL CENTER | Age: 69
End: 2024-04-25
Payer: MEDICARE

## 2024-04-25 DIAGNOSIS — A49.9 POLYMICROBIAL BACTERIAL INFECTION: ICD-10-CM

## 2024-04-25 LAB
ALBUMIN SERPL BCP-MCNC: 3.6 G/DL (ref 3.2–4.9)
ALBUMIN/GLOB SERPL: 1.6 G/DL
ALP SERPL-CCNC: 65 U/L (ref 30–99)
ALT SERPL-CCNC: 16 U/L (ref 2–50)
ANION GAP SERPL CALC-SCNC: 15 MMOL/L (ref 7–16)
APTT PPP: 29.2 SEC (ref 24.7–36)
AST SERPL-CCNC: 17 U/L (ref 12–45)
BACTERIA UR CULT: ABNORMAL
BACTERIA UR CULT: ABNORMAL
BASOPHILS # BLD AUTO: 0.1 % (ref 0–1.8)
BASOPHILS # BLD: 0.01 K/UL (ref 0–0.12)
BILIRUB SERPL-MCNC: 0.8 MG/DL (ref 0.1–1.5)
BUN SERPL-MCNC: 39 MG/DL (ref 8–22)
CALCIUM ALBUM COR SERPL-MCNC: 8.7 MG/DL (ref 8.5–10.5)
CALCIUM SERPL-MCNC: 8.4 MG/DL (ref 8.5–10.5)
CHLORIDE SERPL-SCNC: 99 MMOL/L (ref 96–112)
CO2 SERPL-SCNC: 18 MMOL/L (ref 20–33)
CREAT SERPL-MCNC: 2 MG/DL (ref 0.5–1.4)
D DIMER PPP IA.FEU-MCNC: 3.71 UG/ML (FEU) (ref 0–0.5)
EOSINOPHIL # BLD AUTO: 0.02 K/UL (ref 0–0.51)
EOSINOPHIL NFR BLD: 0.2 % (ref 0–6.9)
ERYTHROCYTE [DISTWIDTH] IN BLOOD BY AUTOMATED COUNT: 44.9 FL (ref 35.9–50)
ERYTHROCYTE [DISTWIDTH] IN BLOOD BY AUTOMATED COUNT: 45.1 FL (ref 35.9–50)
ERYTHROCYTE [SEDIMENTATION RATE] IN BLOOD BY WESTERGREN METHOD: 34 MM/HOUR (ref 0–20)
FIBRINOGEN PPP-MCNC: 531 MG/DL (ref 215–460)
GFR SERPLBLD CREATININE-BSD FMLA CKD-EPI: 36 ML/MIN/1.73 M 2
GLOBULIN SER CALC-MCNC: 2.3 G/DL (ref 1.9–3.5)
GLUCOSE SERPL-MCNC: 356 MG/DL (ref 65–99)
HCT VFR BLD AUTO: 36.1 % (ref 42–52)
HCT VFR BLD AUTO: 36.3 % (ref 42–52)
HGB BLD-MCNC: 11.6 G/DL (ref 14–18)
HGB BLD-MCNC: 11.7 G/DL (ref 14–18)
IMM GRANULOCYTES # BLD AUTO: 0.06 K/UL (ref 0–0.11)
IMM GRANULOCYTES NFR BLD AUTO: 0.7 % (ref 0–0.9)
INR PPP: 1.05 (ref 0.87–1.13)
LYMPHOCYTES # BLD AUTO: 0.22 K/UL (ref 1–4.8)
LYMPHOCYTES NFR BLD: 2.5 % (ref 22–41)
MCH RBC QN AUTO: 27.8 PG (ref 27–33)
MCH RBC QN AUTO: 28.4 PG (ref 27–33)
MCHC RBC AUTO-ENTMCNC: 32 G/DL (ref 32.3–36.5)
MCHC RBC AUTO-ENTMCNC: 32.4 G/DL (ref 32.3–36.5)
MCV RBC AUTO: 87.1 FL (ref 81.4–97.8)
MCV RBC AUTO: 87.6 FL (ref 81.4–97.8)
MONOCYTES # BLD AUTO: 0.63 K/UL (ref 0–0.85)
MONOCYTES NFR BLD AUTO: 7.2 % (ref 0–13.4)
NEUTROPHILS # BLD AUTO: 7.87 K/UL (ref 1.82–7.42)
NEUTROPHILS NFR BLD: 89.3 % (ref 44–72)
NRBC # BLD AUTO: 0 K/UL
NRBC BLD-RTO: 0 /100 WBC (ref 0–0.2)
PLATELET # BLD AUTO: 48 K/UL (ref 164–446)
PLATELET # BLD AUTO: 61 K/UL (ref 164–446)
PLATELET # BLD AUTO: 63 K/UL (ref 164–446)
PLATELETS.RETICULATED NFR BLD AUTO: 10.7 % (ref 0.6–13.1)
PLATELETS.RETICULATED NFR BLD AUTO: 13.7 % (ref 0.6–13.1)
POTASSIUM SERPL-SCNC: 4.6 MMOL/L (ref 3.6–5.5)
PROT SERPL-MCNC: 5.9 G/DL (ref 6–8.2)
PROTHROMBIN TIME: 13.8 SEC (ref 12–14.6)
RBC # BLD AUTO: 4.12 M/UL (ref 4.7–6.1)
RBC # BLD AUTO: 4.17 M/UL (ref 4.7–6.1)
SIGNIFICANT IND 70042: ABNORMAL
SIROLIMUS BLD-MCNC: 14.5 NG/ML
SITE SITE: ABNORMAL
SODIUM SERPL-SCNC: 132 MMOL/L (ref 135–145)
SOURCE SOURCE: ABNORMAL
WBC # BLD AUTO: 8.8 K/UL (ref 4.8–10.8)
WBC # BLD AUTO: 8.8 K/UL (ref 4.8–10.8)

## 2024-04-25 PROCEDURE — 36415 COLL VENOUS BLD VENIPUNCTURE: CPT

## 2024-04-25 PROCEDURE — 85055 RETICULATED PLATELET ASSAY: CPT

## 2024-04-25 PROCEDURE — 85610 PROTHROMBIN TIME: CPT

## 2024-04-25 PROCEDURE — 80053 COMPREHEN METABOLIC PANEL: CPT

## 2024-04-25 PROCEDURE — 85730 THROMBOPLASTIN TIME PARTIAL: CPT

## 2024-04-25 PROCEDURE — 85384 FIBRINOGEN ACTIVITY: CPT

## 2024-04-25 PROCEDURE — 85025 COMPLETE CBC W/AUTO DIFF WBC: CPT

## 2024-04-25 PROCEDURE — 85027 COMPLETE CBC AUTOMATED: CPT | Mod: XU

## 2024-04-25 PROCEDURE — 85055 RETICULATED PLATELET ASSAY: CPT | Mod: 91

## 2024-04-25 PROCEDURE — 85379 FIBRIN DEGRADATION QUANT: CPT

## 2024-04-25 NOTE — TELEPHONE ENCOUNTER
----- Message from SHARON Gandhi sent at 4/25/2024  9:30 AM PDT -----  Can you attempt to  reach out to this patient.  I have already called this morning with no answer.  His labs had elevated glucose and his platelets were slightly down.  I would like to repeat his lab work today since he held his antibiotics last night.  Can you ask him if he is lightheaded, dizzy, diaphoretic, has any heart palpitations or fast heart rate. Thank you     ----- Message -----  From: Patito, Lab  Sent: 4/24/2024  10:19 PM PDT  To: SHARON Gandhi

## 2024-04-25 NOTE — PROGRESS NOTES
Patient called back and we discussed most recent lab results.  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  Currently on glimepiride, Actos, Tradjenta. He does report though his prednisone was just increased from 5 to 20 mg a day which is could be possibly causing his glucose to be elevated.  He currently is asymptomatic for hyperglycemia.  He will check his blood sugar today and if his BS is above 600 he will report to the emergency room.  Continue to hold IV daptomycin until tomorrow 4/26/2024.  Repeat lab work today/tomorrow CBC/CMP for close monitoring.  Recommended patient also keeping close contact with oncology MD Fajardo.  Currently asymptomatic.  ED precautions discussed.

## 2024-04-26 ENCOUNTER — TELEPHONE (OUTPATIENT)
Dept: INFECTIOUS DISEASES | Facility: MEDICAL CENTER | Age: 69
End: 2024-04-26
Payer: MEDICARE

## 2024-04-26 NOTE — TELEPHONE ENCOUNTER
Called and discussed recent lab results with patient via telephone.  White blood count 8.8, stable anemia 11.6/36, minimal improvement and thrombocytopenia 61, glucose 356, creatinine 2.0, GFR 36.  Recommended to hold 1 more dose of daptomycin 7 and then to restart tomorrow 4/27 with lower dose of 4 Mg/KG once daily.  Patient already has scheduled lab draw for 4/29/2024.  Pending repeat CT scan and referral to wound clinic.  Patient reports that he still feeling fatigued.  Recommended increase fluid intake to 1 to 2 L of water a day.  Continue with antibiotic therapy.

## 2024-04-26 NOTE — TELEPHONE ENCOUNTER
Patient called requesting a refill on the Glyburide, but he mentioned that it was put in incorrectly. He stated he takes 2 tablets in the AM and 2 in the PM    
  Hip flexion         5/5        Grossly 3/5     Knee:    Right Left   Knee flexion        5/5        Grossly 3-/5   Knee extension        5/5        Grossly 3-/5     Ankle:   Right Left   Ankle DF         5/5        5/5      Bed Mobility: Independent    Transfers  Sit to Stand: Independent  Stand to sit: Independent    Ambulation  Ambulation?: Yes  WB Status: FWB  Surface: level tile  Device: none  Assistance: Independent  Quality of Gait: Pt demonstrated antalgic gait.   Distance: 30'x4 in clinic  Palpation: Pt demonstrated edema in L knee    Additional Tests: LEFs: 33/80 = 41.25% ability = 58.75% disability.     Assessment   Decreased functional mobility ;Decreased strength;Decreased endurance;Decreased high-level IADLs;Decreased ADL status;Decreased ROM;   Assessment: Pt is a pleasant 69 yo male who would benefit from skilled PT to address decreased ROM, strength, balance, endurance, functional mobility, and increased pain.     Prognosis: Good  Discharge Recommendations: Patient would benefit from additional therapy;Continue to assess pending progress,   Requires PT Follow Up: Yes  Activity Tolerance: Patient Tolerated treatment well.    Treatment Administered: See flowsheet  Patient Education: See flowsheet  Learning Style: Any    Plan   Plan of care initiated  Frequency and duration of tx:  2x/week up to 6 weeks with an additional 1x/week up to 2 weeks as needed.   Barriers include: none  Treatment:  Therapeutic exercises including ROM, PREs, stretching, strengthening, and stability  Therapeutic activity  Gait training  Stair training  Neuro re-ed  Coordination training and body awareness  Positioning and postural awareness  Modalities including e-stim, ultrasound, heat, cold, and foam roll  Manual therapy including: STM, MFR, and TPR  Aquatic Therapy  Therapeutic taping  HEP and education    Goals  Short term goals to be achieved by May 26, 2024 :  Short term goal 1: Pt will report compliance with current HEP

## 2024-04-29 ENCOUNTER — HOSPITAL ENCOUNTER (OUTPATIENT)
Dept: LAB | Facility: MEDICAL CENTER | Age: 69
End: 2024-04-29
Attending: FAMILY MEDICINE
Payer: MEDICARE

## 2024-04-29 LAB
ALBUMIN SERPL BCP-MCNC: 3.7 G/DL (ref 3.2–4.9)
APPEARANCE UR: CLEAR
BACTERIA #/AREA URNS HPF: NEGATIVE /HPF
BASOPHILS # BLD AUTO: 0.6 % (ref 0–1.8)
BASOPHILS # BLD: 0.04 K/UL (ref 0–0.12)
BILIRUB UR QL STRIP.AUTO: NEGATIVE
BUN SERPL-MCNC: 38 MG/DL (ref 8–22)
CALCIUM ALBUM COR SERPL-MCNC: 9.1 MG/DL (ref 8.5–10.5)
CALCIUM SERPL-MCNC: 8.9 MG/DL (ref 8.5–10.5)
CHLORIDE SERPL-SCNC: 102 MMOL/L (ref 96–112)
CO2 SERPL-SCNC: 21 MMOL/L (ref 20–33)
COLOR UR: YELLOW
CREAT SERPL-MCNC: 1.8 MG/DL (ref 0.5–1.4)
EOSINOPHIL # BLD AUTO: 0.13 K/UL (ref 0–0.51)
EOSINOPHIL NFR BLD: 2 % (ref 0–6.9)
EPI CELLS #/AREA URNS HPF: NEGATIVE /HPF
ERYTHROCYTE [DISTWIDTH] IN BLOOD BY AUTOMATED COUNT: 46.4 FL (ref 35.9–50)
GFR SERPLBLD CREATININE-BSD FMLA CKD-EPI: 41 ML/MIN/1.73 M 2
GLUCOSE SERPL-MCNC: 170 MG/DL (ref 65–99)
GLUCOSE UR STRIP.AUTO-MCNC: NEGATIVE MG/DL
HCT VFR BLD AUTO: 38.2 % (ref 42–52)
HGB BLD-MCNC: 11.7 G/DL (ref 14–18)
HYALINE CASTS #/AREA URNS LPF: ABNORMAL /LPF
IMM GRANULOCYTES # BLD AUTO: 0.05 K/UL (ref 0–0.11)
IMM GRANULOCYTES NFR BLD AUTO: 0.8 % (ref 0–0.9)
KETONES UR STRIP.AUTO-MCNC: NEGATIVE MG/DL
LEUKOCYTE ESTERASE UR QL STRIP.AUTO: ABNORMAL
LYMPHOCYTES # BLD AUTO: 0.51 K/UL (ref 1–4.8)
LYMPHOCYTES NFR BLD: 7.9 % (ref 22–41)
MAGNESIUM SERPL-MCNC: 1.9 MG/DL (ref 1.5–2.5)
MCH RBC QN AUTO: 27.1 PG (ref 27–33)
MCHC RBC AUTO-ENTMCNC: 30.6 G/DL (ref 32.3–36.5)
MCV RBC AUTO: 88.4 FL (ref 81.4–97.8)
MICRO URNS: ABNORMAL
MONOCYTES # BLD AUTO: 0.83 K/UL (ref 0–0.85)
MONOCYTES NFR BLD AUTO: 12.8 % (ref 0–13.4)
NEUTROPHILS # BLD AUTO: 4.91 K/UL (ref 1.82–7.42)
NEUTROPHILS NFR BLD: 75.9 % (ref 44–72)
NITRITE UR QL STRIP.AUTO: NEGATIVE
NRBC # BLD AUTO: 0 K/UL
NRBC BLD-RTO: 0 /100 WBC (ref 0–0.2)
PH UR STRIP.AUTO: 5.5 [PH] (ref 5–8)
PHOSPHATE SERPL-MCNC: 2.2 MG/DL (ref 2.5–4.5)
PLATELET # BLD AUTO: 93 K/UL (ref 164–446)
PLATELETS.RETICULATED NFR BLD AUTO: 11.3 % (ref 0.6–13.1)
PMV BLD AUTO: 12.8 FL (ref 9–12.9)
POTASSIUM SERPL-SCNC: 4 MMOL/L (ref 3.6–5.5)
PROT UR QL STRIP: NEGATIVE MG/DL
PROT UR-MCNC: 14 MG/DL (ref 0–15)
RBC # BLD AUTO: 4.32 M/UL (ref 4.7–6.1)
RBC # URNS HPF: ABNORMAL /HPF
RBC UR QL AUTO: NEGATIVE
SODIUM SERPL-SCNC: 137 MMOL/L (ref 135–145)
SP GR UR STRIP.AUTO: 1.02
URATE SERPL-MCNC: 6.9 MG/DL (ref 2.5–8.3)
UROBILINOGEN UR STRIP.AUTO-MCNC: 0.2 MG/DL
WBC # BLD AUTO: 6.5 K/UL (ref 4.8–10.8)
WBC #/AREA URNS HPF: ABNORMAL /HPF

## 2024-04-29 PROCEDURE — 80069 RENAL FUNCTION PANEL: CPT

## 2024-04-29 PROCEDURE — 85055 RETICULATED PLATELET ASSAY: CPT

## 2024-04-29 PROCEDURE — 36415 COLL VENOUS BLD VENIPUNCTURE: CPT

## 2024-04-29 PROCEDURE — 80195 ASSAY OF SIROLIMUS: CPT

## 2024-04-29 PROCEDURE — 84550 ASSAY OF BLOOD/URIC ACID: CPT

## 2024-04-29 PROCEDURE — 81001 URINALYSIS AUTO W/SCOPE: CPT

## 2024-04-29 PROCEDURE — 85025 COMPLETE CBC W/AUTO DIFF WBC: CPT

## 2024-04-29 PROCEDURE — 83735 ASSAY OF MAGNESIUM: CPT

## 2024-04-29 PROCEDURE — 84156 ASSAY OF PROTEIN URINE: CPT

## 2024-04-30 ENCOUNTER — HOSPITAL ENCOUNTER (OUTPATIENT)
Facility: MEDICAL CENTER | Age: 69
End: 2024-04-30
Attending: NURSE PRACTITIONER
Payer: MEDICARE

## 2024-04-30 ENCOUNTER — TELEPHONE (OUTPATIENT)
Dept: INFECTIOUS DISEASES | Facility: MEDICAL CENTER | Age: 69
End: 2024-04-30

## 2024-04-30 ENCOUNTER — HOSPITAL ENCOUNTER (OUTPATIENT)
Dept: RADIOLOGY | Facility: MEDICAL CENTER | Age: 69
End: 2024-04-30
Attending: INTERNAL MEDICINE
Payer: MEDICARE

## 2024-04-30 ENCOUNTER — OFFICE VISIT (OUTPATIENT)
Dept: MEDICAL GROUP | Facility: PHYSICIAN GROUP | Age: 69
End: 2024-04-30
Payer: MEDICARE

## 2024-04-30 VITALS
HEIGHT: 77 IN | HEART RATE: 70 BPM | TEMPERATURE: 98.2 F | WEIGHT: 234 LBS | DIASTOLIC BLOOD PRESSURE: 68 MMHG | RESPIRATION RATE: 18 BRPM | OXYGEN SATURATION: 95 % | SYSTOLIC BLOOD PRESSURE: 112 MMHG | BODY MASS INDEX: 27.63 KG/M2

## 2024-04-30 DIAGNOSIS — C44.82 SQUAMOUS CELL CARCINOMA OF OVERLAPPING SITES OF SKIN: ICD-10-CM

## 2024-04-30 DIAGNOSIS — C44.92 SQUAMOUS CELL CARCINOMA OF SKIN: ICD-10-CM

## 2024-04-30 DIAGNOSIS — R05.1 ACUTE COUGH: ICD-10-CM

## 2024-04-30 DIAGNOSIS — Z51.12 ENCOUNTER FOR ANTINEOPLASTIC IMMUNOTHERAPY: ICD-10-CM

## 2024-04-30 LAB
ALBUMIN SERPL BCP-MCNC: 3.4 G/DL (ref 3.2–4.9)
ALBUMIN/GLOB SERPL: 1.4 G/DL
ALP SERPL-CCNC: 69 U/L (ref 30–99)
ALT SERPL-CCNC: 19 U/L (ref 2–50)
ANION GAP SERPL CALC-SCNC: 14 MMOL/L (ref 7–16)
AST SERPL-CCNC: 13 U/L (ref 12–45)
BASOPHILS # BLD AUTO: 0.1 % (ref 0–1.8)
BASOPHILS # BLD: 0.01 K/UL (ref 0–0.12)
BILIRUB SERPL-MCNC: 0.2 MG/DL (ref 0.1–1.5)
BUN SERPL-MCNC: 44 MG/DL (ref 8–22)
CALCIUM ALBUM COR SERPL-MCNC: 9.2 MG/DL (ref 8.5–10.5)
CALCIUM SERPL-MCNC: 8.7 MG/DL (ref 8.5–10.5)
CHLORIDE SERPL-SCNC: 100 MMOL/L (ref 96–112)
CK SERPL-CCNC: 77 U/L (ref 0–154)
CO2 SERPL-SCNC: 20 MMOL/L (ref 20–33)
CREAT SERPL-MCNC: 1.84 MG/DL (ref 0.5–1.4)
EOSINOPHIL # BLD AUTO: 0.01 K/UL (ref 0–0.51)
EOSINOPHIL NFR BLD: 0.1 % (ref 0–6.9)
ERYTHROCYTE [DISTWIDTH] IN BLOOD BY AUTOMATED COUNT: 45.5 FL (ref 35.9–50)
GFR SERPLBLD CREATININE-BSD FMLA CKD-EPI: 40 ML/MIN/1.73 M 2
GLOBULIN SER CALC-MCNC: 2.5 G/DL (ref 1.9–3.5)
GLUCOSE SERPL-MCNC: 318 MG/DL (ref 65–99)
HCT VFR BLD AUTO: 34.4 % (ref 42–52)
HGB BLD-MCNC: 10.8 G/DL (ref 14–18)
IMM GRANULOCYTES # BLD AUTO: 0.12 K/UL (ref 0–0.11)
IMM GRANULOCYTES NFR BLD AUTO: 1.6 % (ref 0–0.9)
LYMPHOCYTES # BLD AUTO: 0.17 K/UL (ref 1–4.8)
LYMPHOCYTES NFR BLD: 2.3 % (ref 22–41)
MCH RBC QN AUTO: 27.6 PG (ref 27–33)
MCHC RBC AUTO-ENTMCNC: 31.4 G/DL (ref 32.3–36.5)
MCV RBC AUTO: 87.8 FL (ref 81.4–97.8)
MONOCYTES # BLD AUTO: 0.38 K/UL (ref 0–0.85)
MONOCYTES NFR BLD AUTO: 5.1 % (ref 0–13.4)
NEUTROPHILS # BLD AUTO: 6.71 K/UL (ref 1.82–7.42)
NEUTROPHILS NFR BLD: 90.8 % (ref 44–72)
NRBC # BLD AUTO: 0 K/UL
NRBC BLD-RTO: 0 /100 WBC (ref 0–0.2)
PLATELET # BLD AUTO: 111 K/UL (ref 164–446)
PMV BLD AUTO: 12.6 FL (ref 9–12.9)
POTASSIUM SERPL-SCNC: 4.8 MMOL/L (ref 3.6–5.5)
PROT SERPL-MCNC: 5.9 G/DL (ref 6–8.2)
RBC # BLD AUTO: 3.92 M/UL (ref 4.7–6.1)
SODIUM SERPL-SCNC: 134 MMOL/L (ref 135–145)
WBC # BLD AUTO: 7.4 K/UL (ref 4.8–10.8)

## 2024-04-30 RX ORDER — ALBUTEROL SULFATE 90 UG/1
2 AEROSOL, METERED RESPIRATORY (INHALATION) EVERY 4 HOURS PRN
Qty: 8 G | Refills: 1 | Status: SHIPPED | OUTPATIENT
Start: 2024-04-30

## 2024-04-30 RX ORDER — ACETAMINOPHEN AND CODEINE PHOSPHATE 300; 30 MG/1; MG/1
TABLET ORAL
COMMUNITY
Start: 2024-04-19

## 2024-04-30 RX ORDER — SIROLIMUS 2 MG/1
TABLET, FILM COATED ORAL
COMMUNITY
Start: 2024-04-23

## 2024-04-30 RX ORDER — PREDNISONE 10 MG/1
20 TABLET ORAL DAILY
COMMUNITY
Start: 2024-03-28

## 2024-04-30 RX ADMIN — GADOTERIDOL 20 ML: 279.3 INJECTION, SOLUTION INTRAVENOUS at 11:15

## 2024-04-30 ASSESSMENT — FIBROSIS 4 INDEX: FIB4 SCORE: 3.06

## 2024-04-30 NOTE — ASSESSMENT & PLAN NOTE
This is a new problem.  He has been having a intermittent dry cough for the last couple weeks.  He just completed a course of antibiotics for the abscess to his left axilla.  He still followed up with infectious disease and sees them soon to have the drain removed.  He states he does not tend to have allergies necessarily.  He has had inhaler years ago and recalls that that helped.

## 2024-04-30 NOTE — PROGRESS NOTES
Subjective:     CC: Here for his new cough and to discuss ongoing issues.    HPI:   Jama presents today with the following medical concerns:    Acute cough  This is a new problem.  He has been having a intermittent dry cough for the last couple weeks.  He just completed a course of antibiotics for the abscess to his left axilla.  He still followed up with infectious disease and sees them soon to have the drain removed.  He states he does not tend to have allergies necessarily.  He has had inhaler years ago and recalls that that helped.    Squamous cell carcinoma of skin  This is a ongoing problem.  We talked about his workup to date and that further investigation is being done.  He did have an MRI today that showed a spot on one of his thoracic vertebrae.    Past Medical History:   Diagnosis Date    A-fib (HCC)     Arrhythmia     Benign essential hypertension     Diabetes (HCC)     type 2    Heart burn     Hemorrhagic disorder (HCC)     Eliquis    Hyperlipoproteinemia     Hypertension     not on meds anymore    Indigestion     Myocardial infarct (HCC) 2013    stent    Polycystic kidney 09/10/2010    RIGHT KIDNEY TRANSPLANT    [facility] of [facility] left atrial appendage closure device 02/29/2024    Sleep apnea 01/30/2024    states he was told he had sleep apnea but has not had a sleep study    Snoring        Social History     Tobacco Use    Smoking status: Never     Passive exposure: Never    Smokeless tobacco: Never   Vaping Use    Vaping Use: Never used   Substance Use Topics    Alcohol use: No    Drug use: No       Current Outpatient Medications Ordered in Epic   Medication Sig Dispense Refill    predniSONE (DELTASONE) 10 MG Tab Take 20 mg by mouth every day.      Sirolimus 2 MG Tab TAKE 6 TABLETS BY MOUTH ON DAY 1 THEN 3 TABLETS ONCE DAILY THEREAFTER      acetaminophen-codeine #3 (TYLENOL #3) 300-30 MG Tab TAKE 1 TABLET BY MOUTH FOUR TIMES DAILY FOR 6 HOURS AS NEEDED FOR CANCER PAIN      albuterol 108 (90 Base)  "MCG/ACT Aero Soln inhalation aerosol Inhale 2 Puffs every four hours as needed for Shortness of Breath. 8 g 1    omeprazole (PRILOSEC) 20 MG delayed-release capsule Take 1 Capsule by mouth every day. Indications: Heartburn 90 Capsule 3    glyBURIDE (DIABETA) 5 MG Tab TAKE 2 TABLETS BY MOUTH TWICE DAILY WITH MEALS 400 Tablet 3    gabapentin (NEURONTIN) 400 MG Cap Take 1 Capsule by mouth 2 times a day. 90 Capsule 1    sodium bicarbonate (SODIUM BICARBONATE) 650 MG Tab Take 1 Tablet by mouth 2 (two) times a day. 120 Tablet 3    metoprolol SR (TOPROL XL) 25 MG TABLET SR 24 HR Take 1 Tablet by mouth every day. May take additional one tab daily for heart rate sustaining >110 BPM. 100 Tablet 3    atorvastatin (LIPITOR) 80 MG tablet Take 1 Tablet by mouth at bedtime. 100 Tablet 3    tadalafil (CIALIS) 5 MG tablet : TAKE 1 TABLETS 30 MINUTES BEFORE SEXUAL ACTIVITY, DO NOT EXCEED MORE THAN ONE DOSE A DAY 15 Tablet 3    aspirin 81 MG EC tablet Take 1 Tablet by mouth every day. 100 Tablet 1    pioglitazone (ACTOS) 45 MG Tab TAKE ONE TABLET BY MOUTH ONCE DAILY 100 Tablet 3    linagliptin (TRADJENTA) 5 MG Tab tablet Take 1 Tablet by mouth every day. 100 Tablet 3     No current Epic-ordered facility-administered medications on file.       Allergies:  Doxycycline    Health Maintenance: Completed    ROS:  Gen: no fevers/chills, no changes in weight  Eyes: no changes in vision  ENT: no sore throat, no hearing loss, no bloody nose  Pulm: no sob,   CV: no chest pain, no palpitations  GI: no nausea/vomiting, no diarrhea  : no dysuria  MSk: no myalgias  Skin: no rash  Neuro: no headaches, no numbness/tingling  Heme/Lymph: no easy bruising      Objective:       Exam:  /68 (BP Location: Right arm, Patient Position: Sitting, BP Cuff Size: Adult)   Pulse 70   Temp 36.8 °C (98.2 °F) (Temporal)   Resp 18   Ht 1.956 m (6' 5\")   Wt 106 kg (234 lb)   SpO2 95%   BMI 27.75 kg/m²  Body mass index is 27.75 kg/m².    Gen: Alert and " oriented, No apparent distress.  Lungs: Normal effort, CTA bilaterally, no wheezes, rhonchi, or rales  CV: Regular rate and rhythm. No murmurs, rubs, or gallops.  Ext: No clubbing, cyanosis.  Patient does have lymphedema to the left arm.        Assessment & Plan:     68 y.o. male with the following -     1. Acute cough  This is an acute problem.  I am not sure if this may be a reaction to the medication he is on the night of the immunotherapy or allergy related.  Since it is nonproductive and not very often we will just try an albuterol inhaler to start with.  He will let me know if that does not help and then we could try Tessalon Perles.  If he starts to get a productive cough he is to let me know sooner.    2. Squamous cell carcinoma of skin  This is a new issue.  Continue follow-up with his oncologist.      Return if symptoms worsen or fail to improve.    Please note that this dictation was created using voice recognition software. I have made every reasonable attempt to correct obvious errors, but I expect that there are errors of grammar and possibly content that I did not discover before finalizing the note.

## 2024-04-30 NOTE — ASSESSMENT & PLAN NOTE
This is a ongoing problem.  We talked about his workup to date and that further investigation is being done.  He did have an MRI today that showed a spot on one of his thoracic vertebrae.

## 2024-04-30 NOTE — TELEPHONE ENCOUNTER
SHARON Gandhi  You8 minutes ago (8:22 AM)     ZS  IF we can add him after his CT scan on 5/3 would be okay also.

## 2024-04-30 NOTE — TELEPHONE ENCOUNTER
Patient left message, he is scheduled to have his consult with wound care tomorrow 5/1 and wants to schedule same day appt with ID to get his drained removed

## 2024-05-01 ENCOUNTER — OFFICE VISIT (OUTPATIENT)
Dept: WOUND CARE | Facility: MEDICAL CENTER | Age: 69
End: 2024-05-01
Attending: NURSE PRACTITIONER
Payer: MEDICARE

## 2024-05-01 VITALS
TEMPERATURE: 97.9 F | OXYGEN SATURATION: 90 % | DIASTOLIC BLOOD PRESSURE: 87 MMHG | SYSTOLIC BLOOD PRESSURE: 120 MMHG | HEART RATE: 90 BPM | RESPIRATION RATE: 18 BRPM

## 2024-05-01 DIAGNOSIS — C44.629 SQUAMOUS CELL CARCINOMA OF LEFT UPPER EXTREMITY: ICD-10-CM

## 2024-05-01 DIAGNOSIS — D84.9 IMMUNOSUPPRESSED STATUS (HCC): ICD-10-CM

## 2024-05-01 DIAGNOSIS — L02.412 ABSCESS OF AXILLA, LEFT: ICD-10-CM

## 2024-05-01 DIAGNOSIS — I89.0 LYMPHEDEMA OF LEFT ARM: ICD-10-CM

## 2024-05-01 DIAGNOSIS — Z94.0 HISTORY OF RENAL TRANSPLANT: ICD-10-CM

## 2024-05-01 LAB — SIROLIMUS BLD-MCNC: 17.7 NG/ML

## 2024-05-01 PROCEDURE — 99214 OFFICE O/P EST MOD 30 MIN: CPT | Performed by: STUDENT IN AN ORGANIZED HEALTH CARE EDUCATION/TRAINING PROGRAM

## 2024-05-01 PROCEDURE — 3079F DIAST BP 80-89 MM HG: CPT | Performed by: STUDENT IN AN ORGANIZED HEALTH CARE EDUCATION/TRAINING PROGRAM

## 2024-05-01 PROCEDURE — 3074F SYST BP LT 130 MM HG: CPT | Performed by: STUDENT IN AN ORGANIZED HEALTH CARE EDUCATION/TRAINING PROGRAM

## 2024-05-01 RX ORDER — METRONIDAZOLE 500 MG/1
TABLET ORAL
Qty: 30 TABLET | Refills: 3 | Status: SHIPPED | OUTPATIENT
Start: 2024-05-01

## 2024-05-01 ASSESSMENT — ENCOUNTER SYMPTOMS
CHILLS: 0
FEVER: 0

## 2024-05-01 NOTE — PROGRESS NOTES
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 Patient has had heavy drainage from left axillary necrotic fungating tumor. Patient was referred to [facility] for wound care.    Pertinent Medical History: See above     TOBACCO USE:  Denies ever using    Patient's problem list, allergies, and current medications reviewed and updated in Epic    Interval History:    5/1/2024: Clinic visit with Ze Martins MD. Patient reports feeling in normal state of health. He is having some discomfort from left axillary necrotic tumor. He reprots that over the past week much of the fungating necrotic tumor has been sloughing off leaving large necrotic wound with thick slough. Patient has heavy drainage necessitating frequent dressing changes noting increased odor. His drain fell out today, is hanging by suture. Patient also endorses increased edema of left arm which is causing some discomfort.    REVIEW OF SYSTEMS:   Review of Systems   Constitutional:  Positive for malaise/fatigue. Negative for chills and fever.   Musculoskeletal:         Left arm swelling   Skin:         Large necrotic wound secondary to tumor left axilla       PHYSICAL EXAMINATION:   /87   Pulse 90   Temp 36.6 °C (97.9 °F) (Temporal)   Resp 18   SpO2 90%     Physical Exam  Constitutional:       General: He is not in acute distress.     Appearance: Normal appearance.   Cardiovascular:      Rate and Rhythm: Normal rate.      Pulses: Normal pulses.   Pulmonary:      Effort: Pulmonary effort is normal. No respiratory distress.   Musculoskeletal:       Comments: Left upper extremity edema extending from fingers to axilla   Skin:     Comments: Left Axillary Necrotic Tumor: Large open necrotic wound with thick loose necrotic slough, moderate odor. No evidence of periwound cellulitis.   Neurological:      Mental Status: He is alert.         WOUND ASSESSMENT  Wound 03/06/24 Axilla Left (Active)   Wound Image   05/01/24 1656   Site Assessment Pink;Red;Yellow 05/01/24 1656   Periwound Assessment Induration;Blanchable erythema;Edema 05/01/24 1656   Margins Unattached edges 05/01/24 1656   Closure Other (Comment) 05/01/24 1656   Drainage Amount Large 05/01/24 1656   Drainage Description Yellow;Cloudy/turbid;Thick 05/01/24 1656   Treatments Cleansed 05/01/24 1656   Wound Cleansing Hypochlorus Acid 05/01/24 1656   Periwound Protectant No-sting Skin Prep 05/01/24 1656   Dressing Changed New 05/01/24 1656   Dressing Cleansing/Solutions Not Applicable 05/01/24 1656   Dressing Options Dry Roll Gauze;Absorbent Abdominal Pad;Other (Comments) 05/01/24 1656   Dressing Change/Treatment Frequency Daily, and As Needed 05/01/24 1656   Wound Team Following Bi-Monthly 05/01/24 1656   Wound Length (cm) 6 cm 05/01/24 1656   Wound Width (cm) 9 cm 05/01/24 1656   Wound Depth (cm) 3.5 cm 05/01/24 1656   Wound Surface Area (cm^2) 54 cm^2 05/01/24 1656   Wound Volume (cm^3) 189 cm^3 05/01/24 1656   Tunneling (cm) 7.5 cm 05/01/24 1656   Tunneling Clock Position of Wound 12 05/01/24 1656   Wound Odor Mild 05/01/24 1656   Exposed Structures JUDITH 05/01/24 1656   Number of days: 56       PROCEDURE:   - Excisional debridement is contraindicated by active cancer diagnosis  - Used forceps and scissors to remove suture holding dislodged drain in place and also used to remove thick loose necrotic slough.      Pertinent Labs and Diagnostics:    Labs:     A1c:   Lab Results   Component Value Date/Time    HBA1C 6.9 (H) 02/16/2024 08:14 AM    HBA1C 6.9 (A) 02/16/2024 08:14 AM          IMAGING: See  imaging    VASCULAR STUDIES: US LUE negative for DVT    LAST  WOUND CULTURE:  DATE :   Lab Results   Component Value Date/Time    CULTRSULT - (A) 04/23/2024 08:21 AM    CULTRSULT Klebsiella oxytoca  10-50,000 cfu/mL   (A) 04/23/2024 08:21 AM         ASSESSMENT AND PLAN:     1. Squamous cell carcinoma of left upper extremity    5/1/2024  - Left axillary SCC with necrotic fungating tumor which is necrosing and has formed large necrotic wound  - Counseled of limited options for wound healing with active cancer, do not expect wound to resolve until cancer is eradicated   - Will begin palliative wound care to manage drainage and manage odor  - Due to heavy drainage, will perform damp gauze dressings with rolled gauze and covered with ABD pad, will change as needed  - Will order supplies from [employer]  - Will order Flagyl, may be crushed and applied to wound bed for palliative odor control  - Used forceps and scissors to remove loose stringy slough, excisional debridement is contraindicated due to active cancer  - Continue to follow with oncology. Patient has started immunotherapy.  Wound Care: Damp to dry dressings with rolled gauze, dry gauze, Abd pad, tape     2. Abscess of axilla, left    5/1/2024  - Patient had imaging with complex fluid collection left axilla consistent with abscess s/p IR drain placement  - Patient drain has fallen out, removed today in clinic as was not functional  - Patient is following with ID and has upcoming CT scan to monitor abscess. Defer Abx management to ID    3. Lymphedema of left arm    5/1/2024  - Due to location of invasive SCC in left axilla, left arm swelling appears to be consistent with lymphedema. He is have some discomfort due to the edema  - Start compression with tubigrips  - Will place referral to lymphedema clinic    4. History of renal transplant  5. Immunosuppressed status (HCC)  - Risk factor for impaired wound healing.    PATIENT EDUCATION  - Importance of adequate  nutrition for wound healing  -Advised to go to ER for any increased redness, swelling, drainage, or odor, or if patient develops fever, chills, nausea or vomiting.     My total time spent caring for the patient on the day of the encounter was 30 minutes, reviewing history, assessment, counseling and education, and coordination of care.  This does not include time spent on separately billable procedures/tests.    Please note that this note may have been created using voice recognition software. I have worked with technical experts from [facility] to optimize the interface.  I have made every reasonable attempt to correct obvious errors, but there may be errors of grammar and possibly content that I did not discover before finalizing the note.    N

## 2024-05-02 ENCOUNTER — HOSPITAL ENCOUNTER (OUTPATIENT)
Dept: RADIOLOGY | Facility: MEDICAL CENTER | Age: 69
End: 2024-05-02
Attending: NURSE PRACTITIONER
Payer: MEDICARE

## 2024-05-02 DIAGNOSIS — L02.412 ABSCESS OF AXILLA, LEFT: ICD-10-CM

## 2024-05-02 RX ADMIN — IOHEXOL 100 ML: 350 INJECTION, SOLUTION INTRAVENOUS at 14:16

## 2024-05-02 ASSESSMENT — ENCOUNTER SYMPTOMS
PALPITATIONS: 0
SPUTUM PRODUCTION: 0
FEVER: 0
DOUBLE VISION: 0
HEADACHES: 0
SHORTNESS OF BREATH: 0
ABDOMINAL PAIN: 0
COUGH: 0
NECK PAIN: 0
BLURRED VISION: 0
FOCAL WEAKNESS: 0
WHEEZING: 0
CONSTIPATION: 0
BACK PAIN: 0
DIARRHEA: 0
NAUSEA: 0
CHILLS: 0
WEIGHT LOSS: 0
NERVOUS/ANXIOUS: 0
VOMITING: 0
BRUISES/BLEEDS EASILY: 0
DIZZINESS: 0
MYALGIAS: 0

## 2024-05-02 NOTE — WOUND TEAM
[facility]   [facility]   [address]   [address]   [place] [address]   [phone] Fax: [phone]        Duration of Supply Order: 90 Days  Dispense as written.    Ordered 5/1/24 from [employer].    Wound 03/06/24 Axilla Left (Active)   Wound Image   05/01/24 1656   Site Assessment Pink;Red;Yellow 05/01/24 1656   Periwound Assessment Induration;Blanchable erythema;Edema 05/01/24 1656   Margins Unattached edges 05/01/24 1656   Closure Unknown 05/01/24 1656   Drainage Amount Large 05/01/24 1656   Drainage Description Yellow;Cloudy/turbid;Thick 05/01/24 1656   Treatments Cleansed 05/01/24 1656   Wound Cleansing Hypochlorus Acid 05/01/24 1656   Periwound Protectant No-sting Skin Prep 05/01/24 1656   Dressing Changed New 05/01/24 1656   Dressing Cleansing/Solutions Not Applicable 05/01/24 1656   Dressing Options Dry Roll Gauze;Absorbent Abdominal Pad;hypafix tape 05/01/24 1656   Dressing Change/Treatment Frequency Twice daily, and As Needed 05/01/24 1656   Wound Team Following Bi-Monthly 05/01/24 1656   Wound Length (cm) 6 cm 05/01/24 1656   Wound Width (cm) 9 cm 05/01/24 1656   Wound Depth (cm) 3.5 cm 05/01/24 1656   Wound Surface Area (cm^2) 54 cm^2 05/01/24 1656   Wound Volume (cm^3) 189 cm^3 05/01/24 1656   Tunneling (cm) 7.5 cm 05/01/24 1656   Tunneling Clock Position of Wound 12 05/01/24 1656   Wound Odor Mild 05/01/24 1656   Exposed Structures JUDITH 05/01/24 1656

## 2024-05-02 NOTE — PROGRESS NOTES
INFECTIOUS  DISEASE  OUTPATIENT CLINIC  NOTE   Subjective   Primary care provider: Ganesh Benton III, M.D..     Reason for Follow Up:   Follow-up for   1. Polymicrobial bacterial infection  CBC WITH DIFFERENTIAL    Comp Metabolic Panel      2. Abscess of axilla, left        3. Squamous cell carcinoma of skin        4. Immunosuppressed status (HCC)        5. Anemia of chronic disease        6. Stage 3b chronic kidney disease        7. History of renal transplant        8. Thrombocytopenia (HCC)        HPI: Patient previously seen and treated by ID team as inpatient during hospital admission.   Jama Altman is a 68 y.o. male with hx of afib recent watchman procedure,  CAD, renal transplant 2010 (hx of polycystic kidney disease), HLD, HTN and PAD.Admitted 3/6/2024 for nonhealing wound left axilla. He was recently diagnosed with SCCA after biopsy of left axillary mass  2/23/24. CT chest + partially visualized large left axillary mass and necrotic adenopathy consistent with known SCC.  Patchy groundglass density and subsegmental atelectasis, consistent with chronic inflammatory/infectious process. Drain placed 3/7-cultures of fluid polymicrobial + MRSA (only oral option available is Zyvox), E- faecalis, Fingoldia. Repeat  CT 3/11 : Irregular fluid collection in the LEFT axillary subcutaneous soft tissues measuring 8.3 x 5.1 x 11.1 cm with gas bubbles and drainage catheter in place. IR repositioned and up sized drain on 3/14.  Patient will discharge home on 3/16 with drain in place.  He will follow-up with infectious disease outpatient for drain management and ongoing antibiotics.  Ultimately, patient may require surgical intervention for necrotic tissue that is present.  Patient was discharged home on a 14-day course of p.o. Augmentin 875/125 mg twice daily and p.o. Zyvox 600 mg twice daily.     03/22/24-today the patient reports that he has had having nausea while on both Augmentin and Zyvox.  Due to limited  antibiotic options recommend spacing antibiotics by 2 hours and continue taking probiotic to see if he has any relief in symptoms.  He does have as needed Zofran available.  He denies any vomiting, fever, chills, diarrhea.  Left axillary abscess/tumor site with continued drainage.  Patient is keeping a log of drainage output which is slowing compared to hospital admission.  Continues to have 30 to 50 mL of output a day.  We discussed antibiotic treatment options.  Due to his MRSA and history of renal cell transplant he is limited to only oral Zyvox for antibiotic treatment which can only be used for 2 weeks before causing worsening thrombocytopenia.  Patient already has thrombocytopenia 137.  Augmentin is covering the E faecalis and Feingoldia.  Recommended to finish 14-day course of antibiotic therapy which ends on 3/30/24 as there is no good oral options for long-term suppression.  Due to the patient having a drain in place with high output will hopefully prevent worsening infection.  Recommend ultimately to have surgical intervention as necrotic tissue will become a nidus for infection.  Education provided on signs and symptoms of worsening infection and when to report to ER/call 911.  Repeat lab work CBC/CMP in 1 week for close monitoring.  Previous lab work reviewed and discussed with patient from 3/15/2024, WBC 6.6, stable anemia, thrombocytopenia 133, chronic history of elevated creatinine 2.0 and decreased GFR 36 stable.     4/2/24-patient following up after completion of Augmentin and Zyvox. He denies any vomiting, fever, chills, diarrhea. Most recent labs from 3/29/2024, WBC 7.7, anemia worsening 11.9/38.6, thrombocytopenia 48.  Patient was stopped on Zyvox as he completed antibiotic therapy that day.  CMP with creatinine continuing to improve 1.91and GFR slowly improving 38. Pending repeat labs CMP for today. Drain continues to have 20-50 ml of serosanguinous outpatient a day. Pending start date for  immunotherapy. 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 Discussed case with ROCKY Mccoy. No other oral options available for MRSA treatment. Will continue  with PO Augmentin 875/125 mg 1 tab twice daily as well for coverage of E- faecalis, Fingoldia. Orders placed fr CBC/CMP/INR, PICC placement and home infusions.  After daptomycin started will stop Augmentin as Daptomycin will cover E- faecalis, MRSA, and Finegoldia    4/10/24-patient following up while on p.o. Augmentin 875/125 mg twice daily and IV daptomycin 6 Mg/KG once daily with no end date set due to extent of infection.  Stop Augmentin as daptomycin has been shown to have activity against Finegoldia magna. As previously discussed above there is concerns of nidus of infection but patient was unable to have I&D due to, cell carcinoma.  Left axillary wound continues to have drainage in NICKI bulb.  Decreasing erythema and swelling of left axillary wound. Labs reviewed from 4/8/2024, WBC 6.0, stable anemia 11.3/36.4, neutrophils WNL, CMP chronic but elevated creatinine 1.57 trending down, decreased GFR 48 stable, LFTs WNL.  CPK yet to be drawn.  Patient reports he has been tolerating both antibiotics with no complaints of side effects.  He denies any nausea, vomiting, fever, chills, constipation, diarrhea, muscle pains or aches, cramping.  Continue follow-up with oncology and surgery.  Recommended to continue probiotic.      4/19/24-patient following up while on IV daptomycin 6 Mg/KG daily.  Patient reports he is still tolerating IV daptomycin with no complaints of side effects.  He denies any nausea, vomiting, fever, chills, constipation, diarrhea, muscle pains or aches.  Left axillary wound  continues to decrease in size NICKI bulb has less output.  Most recent labs reviewed from 4/17/2024, WBC 6.6, stable anemia 11.7/37.1, thrombocytopenia chronic and stable 71, CMP chronically elevated creatinine 1.60 stable, decreased GFR 47 stable, LFTs WNL, CPK 75 and stable.  Continue daily infusions of IV daptomycin and follow-up with oncology and surgery.  Discussed case with oncology MD Fajardo, outside PET scan shows uptake in T5, possible metastasis versus infection.  Given that patient has been on IV daptomycin and no previous reports of bacteremia more likely metastasis but will wait for MRI of T and L-spine which was ordered by MD Fajardo.  Left axillary NICKI bulb and extension tubing changed in office.  Continues to have decreased swelling and left axillary.  Necrotic tissue with odorous smell, no signs of worsening infection.  Serosanguineous drainage in NICKI bulb 20 mL daily.  Continue with IV daptomycin 6 Mg/KG daily with weekly labs CBC/CMP/CPK.  Ultimately would require I&D of left axillary to remove nidus of infection    4/24/24-patient comes into the clinic today with complaints that his left axillary NICKI drain is having hardly any fluid output but he is having significant drainage from around tube and from wound site.  He does report increasing fatigue and increasing swelling in the left axillary.  He denies any nausea, vomiting, fever, chills, diarrhea, muscle pains or aches.  Repeat CT scan chest with contrast for a left evaluation of drain site.  Possibly may remove drain site if abscess is resolved.  Wound with increasing tissue slough edge and drainage, serosanguineous drainage.  Stat referral placed to wound clinic.  Will decrease dose of IV daptomycin to 4 Mg/KG once daily.  Previous lab work reviewed from 4/23/2024, WBC 7.0, anemia resolved, thrombocytopenia 61.  Possibly due to daptomycin so outpatient hold dose for the next x 2 days then restart daptomycin at 4 Mg/KG.  Creatinine chronically  elevated 1.72 and stable, decreased GFR 43 and stable, CPK 75 stable.  Wound dressing supplies provided to patient.    5/3/24-CT scan from 5/2/2024 shows resolution of left axillary fluid collection as there is now a large soft tissue deficit.  There has been an increase in bilateral nodular with infiltrates which may represent either neoplasm or infection.  Left axillary site still contains necrotic tissue which patient reports still continues to slough.  No signs of active infection.  Stop daptomycin today.  PICC line removed in office.  Pressure held until hemostasis achieved.  Most recent labs reviewed from 4/30/2024, WBC 7.4, stable anemia 10.8/34.4, platelets 111 and trending upwards, CMP with chronic but elevated creatinine 1.84 and GFR 40 slowly improving.  Repeat labs CBC/CMP while off antibiotic therapy in 1 week for close monitoring.  Patient denies any nausea, vomiting, fever, chills, diarrhea.  He does report fatigue and continues to have left arm swelling.  Updated wound team and oncology on plan of care and stopping antibiotic therapy and monitoring.       Current Antimicrobials:   Previous Antimicrobials: Unasyn, Augmentin, Zyvox, Augmentin, daptomycin    Other Current Medications:  Home Medications    Medication Sig Taking? Last Dose Authorizing Provider   metroNIDAZOLE (FLAGYL) 500 MG Tab Crush 1-2 tablets and apply dirrectly into wound bed daily as needed for odor control. Yes Taking Ze Martins M.D.   predniSONE (DELTASONE) 10 MG Tab Take 20 mg by mouth every day. Yes Taking Physician Outpatient   Sirolimus 2 MG Tab TAKE 6 TABLETS BY MOUTH ON DAY 1 THEN 3 TABLETS ONCE DAILY THEREAFTER Yes Taking Physician Outpatient   acetaminophen-codeine #3 (TYLENOL #3) 300-30 MG Tab TAKE 1 TABLET BY MOUTH FOUR TIMES DAILY FOR 6 HOURS AS NEEDED FOR CANCER PAIN Yes Taking Physician Outpatient   albuterol 108 (90 Base) MCG/ACT Aero Soln inhalation aerosol Inhale 2 Puffs every four hours as needed for  Shortness of Breath. Yes Taking Ganesh Benton III, M.D.   omeprazole (PRILOSEC) 20 MG delayed-release capsule Take 1 Capsule by mouth every day. Indications: Heartburn Yes Taking Ganesh Benton III, M.D.   glyBURIDE (DIABETA) 5 MG Tab TAKE 2 TABLETS BY MOUTH TWICE DAILY WITH MEALS Yes Taking Ganesh Benton III, M.D.   gabapentin (NEURONTIN) 400 MG Cap Take 1 Capsule by mouth 2 times a day. Yes Taking Spring Romero M.D.   sodium bicarbonate (SODIUM BICARBONATE) 650 MG Tab Take 1 Tablet by mouth 2 (two) times a day. Yes Taking Spring Romero M.D.   metoprolol SR (TOPROL XL) 25 MG TABLET SR 24 HR Take 1 Tablet by mouth every day. May take additional one tab daily for heart rate sustaining >110 BPM. Yes Taking JERRICA CadetP.RCOLETTE   atorvastatin (LIPITOR) 80 MG tablet Take 1 Tablet by mouth at bedtime. Yes Taking Ganesh Benton III, M.D.   tadalafil (CIALIS) 5 MG tablet : TAKE 1 TABLETS 30 MINUTES BEFORE SEXUAL ACTIVITY, DO NOT EXCEED MORE THAN ONE DOSE A DAY Yes Taking Ganesh Benton III, M.D.   aspirin 81 MG EC tablet Take 1 Tablet by mouth every day. Yes Taking GENEVA Spears.P.RCOLETTE   pioglitazone (ACTOS) 45 MG Tab TAKE ONE TABLET BY MOUTH ONCE DAILY Yes Taking Ganesh Benton III, M.D.   linagliptin (TRADJENTA) 5 MG Tab tablet Take 1 Tablet by mouth every day. Yes Taking Ganesh Benton III, M.D.        PMH:  Past Medical History:   Diagnosis Date    A-fib (HCC)     Arrhythmia     Benign essential hypertension     Diabetes (HCC)     type 2    Heart burn     Hemorrhagic disorder (HCC)     Eliquis    Hyperlipoproteinemia     Hypertension     not on meds anymore    Indigestion     Myocardial infarct (HCC) 2013    stent    Polycystic kidney 09/10/2010    RIGHT KIDNEY TRANSPLANT    Presence of Watchman left atrial appendage closure device 02/29/2024    Sleep apnea 01/30/2024    states he was told he had sleep apnea but has not had a sleep study    Snoring      Past Surgical History:   Procedure  Laterality Date    STENT PLACEMENT  2013    cardiac    KNEE MANIPULATION  02/16/2012    Performed by LATOYA CONNER at SURGERY [facility] ORS    KNEE UNICOMPARTMENTAL  12/23/2011    Performed by LTAOYA CONNER at SURGERY [facility] ORS    KNEE ARTHROSCOPY  12/23/2011    Performed by LATOYA CONNER at SURGERY [facility] ORS    KNEE ARTHROSCOPY  05/03/2011    Performed by HANANE GOLDMAN at SURGERY SAME DAY [facility] ORS    MENISCECTOMY, KNEE, MEDIAL  05/03/2011    Performed by HANANE GOLDMAN at SURGERY SAME DAY [facility] ORS    OTHER  09/10/2010    RIGHT KIDNEY TRANSPLANT    KNEE ARTHROPLASTY TOTAL  01/12/2007    RIGHT    KNEE ARTHROSCOPY  04/10/2006    RIGHT    OTHER ORTHOPEDIC SURGERY  07/08/1974    LEFT KNEE DEBRIDEMENT     No family history on file.  Social History     Socioeconomic History    Marital status: Single     Spouse name: Not on file    Number of children: Not on file    Years of education: Not on file    Highest education level: Not on file   Occupational History    Not on file   Tobacco Use    Smoking status: Never     Passive exposure: Never    Smokeless tobacco: Never   Vaping Use    Vaping Use: Never used   Substance and Sexual Activity    Alcohol use: No    Drug use: No    Sexual activity: Yes     Partners: Female   Other Topics Concern    Not on file   Social History Narrative    Not on file     Social Determinants of Health     Financial Resource Strain: Not on file   Food Insecurity: Not on file   Transportation Needs: Not on file   Physical Activity: Not on file   Stress: Not on file   Social Connections: Not on file   Intimate Partner Violence: Not on file   Housing Stability: Not on file           Allergies/Intolerances:  Allergies   Allergen Reactions    Doxycycline Rash     Sweats and shakes: 9/28/17: Clarified allergy with patient. Allergy was in 1998 and he doesn't remember what happened. He thought the medication is for pain.  Tolerates doxycycline 9/2017       ROS:   Review of  "Systems   Constitutional:  Positive for malaise/fatigue. Negative for chills, fever and weight loss.   HENT:  Negative for congestion and hearing loss.    Eyes:  Negative for blurred vision and double vision.   Respiratory:  Negative for cough, sputum production, shortness of breath and wheezing.    Cardiovascular:  Negative for chest pain and palpitations.   Gastrointestinal:  Negative for abdominal pain, constipation, diarrhea, nausea and vomiting.   Genitourinary:  Negative for dysuria.   Musculoskeletal:  Negative for back pain, joint pain, myalgias and neck pain.   Skin:  Negative for itching and rash.   Neurological:  Negative for dizziness, focal weakness and headaches.   Endo/Heme/Allergies:  Does not bruise/bleed easily.   Psychiatric/Behavioral:  The patient is not nervous/anxious.       ROS was reviewed and were negative except as above.    Objective    Most Recent Vital Signs:  /62 (BP Location: Right arm, Patient Position: Sitting, BP Cuff Size: Adult)   Pulse 91   Temp 36.7 °C (98 °F) (Temporal)   Resp 16   Ht 1.956 m (6' 5\")   Wt 106 kg (234 lb)   SpO2 93%   BMI 27.75 kg/m²     Physical Exam:  Physical Exam  Vitals reviewed.   Constitutional:       Appearance: Normal appearance.   HENT:      Head: Normocephalic and atraumatic.      Nose: Nose normal.      Mouth/Throat:      Mouth: Mucous membranes are moist.   Eyes:      Pupils: Pupils are equal, round, and reactive to light.   Cardiovascular:      Rate and Rhythm: Normal rate and regular rhythm.   Pulmonary:      Effort: Pulmonary effort is normal.      Breath sounds: Normal breath sounds.   Abdominal:      Palpations: Abdomen is soft.   Musculoskeletal:         General: No tenderness.      Cervical back: Normal range of motion and neck supple.      Comments: Left axillary carcinoma.  Left axillary site still contains necrotic tissue which patient reports still continues to slough.  No signs of active infection   Skin:     General: Skin " is warm and dry.      Coloration: Skin is not jaundiced.      Findings: No erythema or rash.      Comments: Left arm edema +1   Neurological:      Mental Status: He is alert and oriented to person, place, and time.      Motor: No weakness.   Psychiatric:         Mood and Affect: Mood normal.         Behavior: Behavior normal.          Pertinent Lab/Imaging Results:  [unfilled]  @CMP@  WBC   Date/Time Value Ref Range Status   04/30/2024 01:30 PM 7.4 4.8 - 10.8 K/uL Final     RBC   Date/Time Value Ref Range Status   04/30/2024 01:30 PM 3.92 (L) 4.70 - 6.10 M/uL Final     Hemoglobin   Date/Time Value Ref Range Status   04/30/2024 01:30 PM 10.8 (L) 14.0 - 18.0 g/dL Final     Hematocrit   Date/Time Value Ref Range Status   04/30/2024 01:30 PM 34.4 (L) 42.0 - 52.0 % Final     MCV   Date/Time Value Ref Range Status   04/30/2024 01:30 PM 87.8 81.4 - 97.8 fL Final     MCH   Date/Time Value Ref Range Status   04/30/2024 01:30 PM 27.6 27.0 - 33.0 pg Final     MCHC   Date/Time Value Ref Range Status   04/30/2024 01:30 PM 31.4 (L) 32.3 - 36.5 g/dL Final     Comment:     Please note new reference range effective 05/22/2023.     MPV   Date/Time Value Ref Range Status   04/30/2024 01:30 PM 12.6 9.0 - 12.9 fL Final      Sodium   Date/Time Value Ref Range Status   04/30/2024 01:30  (L) 135 - 145 mmol/L Final     Potassium   Date/Time Value Ref Range Status   04/30/2024 01:30 PM 4.8 3.6 - 5.5 mmol/L Final     Chloride   Date/Time Value Ref Range Status   04/30/2024 01:30  96 - 112 mmol/L Final     Co2   Date/Time Value Ref Range Status   04/30/2024 01:30 PM 20 20 - 33 mmol/L Final     Glucose   Date/Time Value Ref Range Status   04/30/2024 01:30  (H) 65 - 99 mg/dL Final     Bun   Date/Time Value Ref Range Status   04/30/2024 01:30 PM 44 (H) 8 - 22 mg/dL Final     Creatinine   Date/Time Value Ref Range Status   04/30/2024 01:30 PM 1.84 (H) 0.50 - 1.40 mg/dL Final   12/18/2006 06:20 AM 2.4 (H) 0.5 - 1.4 mg/dL Final      Alkaline Phosphatase   Date/Time Value Ref Range Status   04/30/2024 01:30 PM 69 30 - 99 U/L Final     AST(SGOT)   Date/Time Value Ref Range Status   04/30/2024 01:30 PM 13 12 - 45 U/L Final     ALT(SGPT)   Date/Time Value Ref Range Status   04/30/2024 01:30 PM 19 2 - 50 U/L Final     Total Bilirubin   Date/Time Value Ref Range Status   04/30/2024 01:30 PM 0.2 0.1 - 1.5 mg/dL Final      CPK Total   Date/Time Value Ref Range Status   04/30/2024 01:30 PM 77 0 - 154 U/L Final      Recent Labs     04/30/24  1330   ASTSGOT 13   ALTSGPT 19   TBILIRUBIN 0.2   ALKPHOSPHAT 69   GLOBULIN 2.5     Blood Culture   Date Value Ref Range Status   09/27/2017 No growth after 5 days of incubation.  Final     Blood Culture Hold   Date Value Ref Range Status   09/27/2017 Collected  Final       Blood Culture   Date Value Ref Range Status   09/27/2017 No growth after 5 days of incubation.  Final     Blood Culture Hold   Date Value Ref Range Status   09/27/2017 Collected  Final          CULTURE WOUND W/ GRAM STAIN  Order: [id number] - Reflex for Order [id number]   Status: Edited Result - FINAL       Visible to patient: Yes (not seen)       Next appt: 03/29/2024 at 03:00 PM in Infectious Diseases (Ovidio Linton A.P.R.N.)    Specimen Information: Wound   0 Result Notes      Component 2 wk ago   Significant Indicator POS Positive (POS)   Source WND   Site L Axillary Fluid   Culture Result - Abnormal    Gram Stain Result Rare WBCs.  Moderate Gram positive cocci.   Culture Result  Abnormal   Methicillin Resistant Staphylococcus aureus  Heavy growth  This isolate is presumed to be clindamycin resistant based on  detection of inducible resistance.    Culture Result  Abnormal   Enterococcus faecalis  Heavy growth    Resulting Agency M        Susceptibility     Methicillin resistant staphylococcus aureus Enterococcus faecalis     CESIA CESIA     Ampicillin   <=2 mcg/mL Sensitive     Ampicillin/sulbactam <=8/4 mcg/mL Resistant       Cefazolin <=8  mcg/mL Resistant       Cefepime 8 mcg/mL Resistant       Clindamycin 0.5 mcg/mL Resistant       Daptomycin <=0.5 mcg/mL Sensitive 2 mcg/mL Sensitive     Erythromycin >4 mcg/mL Resistant       Gent Synergy   <=500 mcg/mL Sensitive     Linezolid 2 mcg/mL Sensitive 2 mcg/mL Sensitive     Oxacillin >2 mcg/mL Resistant       Penicillin   2 mcg/mL Sensitive     Tetracycline >8 mcg/mL Resistant >8 mcg/mL Resistant     Trimeth/Sulfa <=0.5/9.5 m... Sensitive       Vancomycin 1 mcg/mL Sensitive 1 mcg/mL Sensitive                    Narrative  Performed by: M  Left axillary fluid aspirate  Left axillary fluid aspirate      Specimen Collected: 03/07/24 12:06 PM Last Resulted: 03/10/24  8:46 AM           CULTURE WOUND W/ GRAM STAIN  Order: [id number] - Reflex for Order [id number]   Status: Edited Result - FINAL       Visible to patient: Yes (not seen)       Next appt: 03/29/2024 at 03:00 PM in Infectious Diseases (Ovidio Linton A.P.R.N.)    Specimen Information: Wound   0 Result Notes      Component 2 wk ago   Significant Indicator POS Positive (POS)   Source WND   Site L Axillary Fluid   Culture Result - Abnormal    Gram Stain Result Rare WBCs.  Moderate Gram positive cocci.   Culture Result  Abnormal   Methicillin Resistant Staphylococcus aureus  Heavy growth  This isolate is presumed to be clindamycin resistant based on  detection of inducible resistance.    Culture Result  Abnormal   Enterococcus faecalis  Heavy growth    Resulting Agency M        Susceptibility     Methicillin resistant staphylococcus aureus Enterococcus faecalis     CESIA CESIA     Ampicillin   <=2 mcg/mL Sensitive     Ampicillin/sulbactam <=8/4 mcg/mL Resistant       Cefazolin <=8 mcg/mL Resistant       Cefepime 8 mcg/mL Resistant       Clindamycin 0.5 mcg/mL Resistant       Daptomycin <=0.5 mcg/mL Sensitive 2 mcg/mL Sensitive     Erythromycin >4 mcg/mL Resistant       Gent Synergy   <=500 mcg/mL Sensitive     Linezolid 2 mcg/mL Sensitive 2 mcg/mL Sensitive      Oxacillin >2 mcg/mL Resistant       Penicillin   2 mcg/mL Sensitive     Tetracycline >8 mcg/mL Resistant >8 mcg/mL Resistant     Trimeth/Sulfa <=0.5/9.5 m... Sensitive       Vancomycin 1 mcg/mL Sensitive 1 mcg/mL Sensitive                    Narrative  Performed by: BENEDICT  Left axillary fluid aspirate  Left axillary fluid aspirate      Specimen Collected: 03/07/24 12:06 PM Last Resulted: 03/10/24  8:46 AM        Lab Flowsheet        Order Details        View Encounter        Lab and Collection Details        Routing        Result History     View All Conversations on this Encounter           Result Care Coordination      Patient Communication     Add Comments   Not seen Back to Top          Contains abnormal data Anaerobic Culture  Order: [id number] - Reflex for Order [id number]   Status: Final result         Visible to patient: Yes (not seen)         Next appt: 03/29/2024 at 03:00 PM in Infectious Diseases (JERRICA GandhiPSHANE)      Specimen Information: Wound   0 Result Notes          Component 2 wk ago   Significant Indicator POS Positive (POS)   Source WND   Site L Axillary Fluid   Culture Result - Abnormal    Culture Result  Abnormal   Finegoldia magna  Heavy growth    Resulting Agency M              Narrative  Performed by: BENEDICT  Left axillary fluid aspirate  Left axillary fluid aspirate      Specimen Collected: 03/07/24 12:06 PM Last Resulted: 03/10/24  1:58 PM              Impression/Assessment      1. Polymicrobial bacterial infection  CBC WITH DIFFERENTIAL    Comp Metabolic Panel      2. Abscess of axilla, left        3. Squamous cell carcinoma of skin        4. Immunosuppressed status (HCC)        5. Anemia of chronic disease        6. Stage 3b chronic kidney disease        7. History of renal transplant        8. Thrombocytopenia (HCC)              Jama Altman is a 68 y.o. male with hx of afib recent watchman procedure,  CAD, renal transplant 2010 (hx of polycystic kidney disease), HLD, HTN and  PAD.Admitted 3/6/2024 for nonhealing wound left axilla. He was recently diagnosed with SCCA after biopsy of left axillary mass  2/23/24. CT chest + partially visualized large left axillary mass and necrotic adenopathy consistent with known SCC.  Patchy groundglass density and subsegmental atelectasis, consistent with chronic inflammatory/infectious process. Drain placed 3/7-cultures of fluid polymicrobial + MRSA (only oral option available is Zyvox), E- faecalis, Fingoldia. Repeat  CT 3/11 : Irregular fluid collection in the LEFT axillary subcutaneous soft tissues measuring 8.3 x 5.1 x 11.1 cm with gas bubbles and drainage catheter in place. IR repositioned and up sized drain on 3/14.  Patient will discharge home on 3/16 with drain in place.  He will follow-up with infectious disease outpatient for drain management and ongoing antibiotics.  Ultimately, patient may require surgical intervention for necrotic tissue that is present.  Patient was discharged home on a 14-day course of p.o. Augmentin 875/125 mg twice daily and p.o. Zyvox 600 mg twice daily.  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 Discussed case with ID MD Mccoy. No other oral options available for MRSA treatment.  IV daptomycin 6 Mg/KG once daily 6-week course with possible end date 5/21/2024 with a possibility of extending      5/3/24-CT scan from 5/2/2024 shows resolution of left axillary fluid collection as there is now a large soft tissue deficit.  There has been an increase in bilateral nodular with infiltrates which may represent either neoplasm or infection.  Left axillary site still contains necrotic tissue which patient reports still continues to slough.  No signs of active  infection.  Stop daptomycin today.  PICC line removed in office.  Pressure held until hemostasis achieved.  Most recent labs reviewed from 4/30/2024, WBC 7.4, stable anemia 10.8/34.4, platelets 111 and trending upwards, CMP with chronic but elevated creatinine 1.84 and GFR 40 slowly improving.  Repeat labs CBC/CMP while off antibiotic therapy in 1 week for close monitoring.  Patient denies any nausea, vomiting, fever, chills, diarrhea.  He does report fatigue and continues to have left arm swelling.  Updated wound team and oncology on plan of care and stopping antibiotic therapy and monitoring.   - Due to his MRSA and history of renal cell transplant he is limited to only oral Zyvox for antibiotic treatment which can only be used for 2 weeks before causing worsening thrombocytopenia.  No good oral options for long-term suppression.  Recommend ultimately to have surgical intervention as necrotic tissue will become a nidus for infection.    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  Repeat CT scan shows no axillary abscess.   -  Repeat lab work CBC/CMP in 1 week   -  Recommend routine follow up with oncology and wound team  -  Continue wound care to left arm.    -  Education provided on S/S of infection and when to report to ER/ call [phone]       Return visit: 1 weeks. Follow up with primary care physician for chronic medical problems      I have performed a physical exam,  updated ROS and plan today. I have reviewed previous images, labs, and provider notes.      BITA Gandhi.    All Patients should seek medical re-evaluation or report to the ER for new, increasing or worsening symptoms. In some circumstances medical conditions can change from the initial evaluation and may require emergent medical re-evaluation. This includes but is not limited to chest pain, shortness of breath, atypical abdominal pain, atypical  headache, ALOC, fever >101, low blood pressure, high respiratory rate (above 30), low oxygen saturation (below 90%), acute delirium, abnormal bleeding, inability to tolerate any intake, weakness on one side of the body, any worsened or concerning conditions.    Please note that this dictation was created using voice recognition software. I have worked with technical experts from [facility] to optimize the interface.  I have made every reasonable attempt to correct obvious errors, but there may be errors of grammar and possibly content that I did not discover before finalizing the note.

## 2024-05-02 NOTE — PATIENT INSTRUCTIONS
-Keep your wound dressing clean, dry, and intact.     -Change your dressing twice daily and if it becomes soiled, soaked, or falls off.    -Should you experience any significant changes in your wound(s), such as infection (redness, swelling, localized heat, increased pain, fever > 101 F, chills) or have any questions regarding your home care instructions, please contact the wound center at [phone]. If after hours, contact your primary care physician or go to the hospital emergency room.

## 2024-05-03 ENCOUNTER — OFFICE VISIT (OUTPATIENT)
Dept: INFECTIOUS DISEASES | Facility: MEDICAL CENTER | Age: 69
End: 2024-05-03
Attending: NURSE PRACTITIONER
Payer: MEDICARE

## 2024-05-03 ENCOUNTER — HOSPITAL ENCOUNTER (OUTPATIENT)
Dept: RADIOLOGY | Facility: MEDICAL CENTER | Age: 69
End: 2024-05-03
Attending: INTERNAL MEDICINE
Payer: MEDICARE

## 2024-05-03 VITALS
BODY MASS INDEX: 27.63 KG/M2 | HEART RATE: 91 BPM | OXYGEN SATURATION: 93 % | SYSTOLIC BLOOD PRESSURE: 104 MMHG | HEIGHT: 77 IN | WEIGHT: 234 LBS | RESPIRATION RATE: 16 BRPM | DIASTOLIC BLOOD PRESSURE: 62 MMHG | TEMPERATURE: 98 F

## 2024-05-03 DIAGNOSIS — A49.9 POLYMICROBIAL BACTERIAL INFECTION: ICD-10-CM

## 2024-05-03 DIAGNOSIS — N18.32 STAGE 3B CHRONIC KIDNEY DISEASE: ICD-10-CM

## 2024-05-03 DIAGNOSIS — D69.6 THROMBOCYTOPENIA (HCC): ICD-10-CM

## 2024-05-03 DIAGNOSIS — Z51.12 ENCOUNTER FOR ANTINEOPLASTIC IMMUNOTHERAPY: ICD-10-CM

## 2024-05-03 DIAGNOSIS — Z94.0 HISTORY OF RENAL TRANSPLANT: ICD-10-CM

## 2024-05-03 DIAGNOSIS — C44.92 SQUAMOUS CELL CARCINOMA OF SKIN: ICD-10-CM

## 2024-05-03 DIAGNOSIS — L02.412 ABSCESS OF AXILLA, LEFT: ICD-10-CM

## 2024-05-03 DIAGNOSIS — D84.9 IMMUNOSUPPRESSED STATUS (HCC): ICD-10-CM

## 2024-05-03 DIAGNOSIS — D63.8 ANEMIA OF CHRONIC DISEASE: ICD-10-CM

## 2024-05-03 DIAGNOSIS — C44.82 SQUAMOUS CELL CARCINOMA OF OVERLAPPING SITES OF SKIN: ICD-10-CM

## 2024-05-03 PROCEDURE — 99214 OFFICE O/P EST MOD 30 MIN: CPT | Performed by: NURSE PRACTITIONER

## 2024-05-03 PROCEDURE — 3074F SYST BP LT 130 MM HG: CPT | Performed by: NURSE PRACTITIONER

## 2024-05-03 PROCEDURE — 3078F DIAST BP <80 MM HG: CPT | Performed by: NURSE PRACTITIONER

## 2024-05-03 ASSESSMENT — FIBROSIS 4 INDEX: FIB4 SCORE: 1.8

## 2024-05-07 ENCOUNTER — HOSPITAL ENCOUNTER (OUTPATIENT)
Dept: LAB | Facility: MEDICAL CENTER | Age: 69
End: 2024-05-07
Attending: NURSE PRACTITIONER
Payer: MEDICARE

## 2024-05-07 DIAGNOSIS — A49.9 POLYMICROBIAL BACTERIAL INFECTION: ICD-10-CM

## 2024-05-07 LAB
ALBUMIN SERPL BCP-MCNC: 3.6 G/DL (ref 3.2–4.9)
ALBUMIN/GLOB SERPL: 1.5 G/DL
ALP SERPL-CCNC: 76 U/L (ref 30–99)
ALT SERPL-CCNC: 17 U/L (ref 2–50)
ANION GAP SERPL CALC-SCNC: 11 MMOL/L (ref 7–16)
AST SERPL-CCNC: 18 U/L (ref 12–45)
BASOPHILS # BLD AUTO: 0 % (ref 0–1.8)
BASOPHILS # BLD: 0 K/UL (ref 0–0.12)
BILIRUB SERPL-MCNC: 0.3 MG/DL (ref 0.1–1.5)
BUN SERPL-MCNC: 30 MG/DL (ref 8–22)
BURR CELLS BLD QL SMEAR: NORMAL
CALCIUM ALBUM COR SERPL-MCNC: 9.2 MG/DL (ref 8.5–10.5)
CALCIUM SERPL-MCNC: 8.9 MG/DL (ref 8.5–10.5)
CHLORIDE SERPL-SCNC: 100 MMOL/L (ref 96–112)
CO2 SERPL-SCNC: 24 MMOL/L (ref 20–33)
CREAT SERPL-MCNC: 1.57 MG/DL (ref 0.5–1.4)
EOSINOPHIL # BLD AUTO: 0.09 K/UL (ref 0–0.51)
EOSINOPHIL NFR BLD: 0.9 % (ref 0–6.9)
ERYTHROCYTE [DISTWIDTH] IN BLOOD BY AUTOMATED COUNT: 46.4 FL (ref 35.9–50)
GFR SERPLBLD CREATININE-BSD FMLA CKD-EPI: 48 ML/MIN/1.73 M 2
GLOBULIN SER CALC-MCNC: 2.4 G/DL (ref 1.9–3.5)
GLUCOSE SERPL-MCNC: 211 MG/DL (ref 65–99)
HCT VFR BLD AUTO: 37.7 % (ref 42–52)
HGB BLD-MCNC: 11.9 G/DL (ref 14–18)
LYMPHOCYTES # BLD AUTO: 0.61 K/UL (ref 1–4.8)
LYMPHOCYTES NFR BLD: 6.1 % (ref 22–41)
MANUAL DIFF BLD: NORMAL
MCH RBC QN AUTO: 27.4 PG (ref 27–33)
MCHC RBC AUTO-ENTMCNC: 31.6 G/DL (ref 32.3–36.5)
MCV RBC AUTO: 86.9 FL (ref 81.4–97.8)
METAMYELOCYTES NFR BLD MANUAL: 0.9 %
MONOCYTES # BLD AUTO: 0.52 K/UL (ref 0–0.85)
MONOCYTES NFR BLD AUTO: 5.2 % (ref 0–13.4)
MORPHOLOGY BLD-IMP: NORMAL
NEUTROPHILS # BLD AUTO: 8.69 K/UL (ref 1.82–7.42)
NEUTROPHILS NFR BLD: 86.9 % (ref 44–72)
NRBC # BLD AUTO: 0 K/UL
NRBC BLD-RTO: 0 /100 WBC (ref 0–0.2)
OVALOCYTES BLD QL SMEAR: NORMAL
PLATELET # BLD AUTO: 142 K/UL (ref 164–446)
PLATELET BLD QL SMEAR: NORMAL
PMV BLD AUTO: 11.8 FL (ref 9–12.9)
POIKILOCYTOSIS BLD QL SMEAR: NORMAL
POTASSIUM SERPL-SCNC: 4.5 MMOL/L (ref 3.6–5.5)
PROT SERPL-MCNC: 6 G/DL (ref 6–8.2)
RBC # BLD AUTO: 4.34 M/UL (ref 4.7–6.1)
RBC BLD AUTO: PRESENT
SODIUM SERPL-SCNC: 135 MMOL/L (ref 135–145)
WBC # BLD AUTO: 10 K/UL (ref 4.8–10.8)

## 2024-05-07 ASSESSMENT — ENCOUNTER SYMPTOMS
ABDOMINAL PAIN: 0
PALPITATIONS: 0
MYALGIAS: 0
SPUTUM PRODUCTION: 0
DIZZINESS: 0
WHEEZING: 0
WEIGHT LOSS: 0
NERVOUS/ANXIOUS: 0
BRUISES/BLEEDS EASILY: 0
NAUSEA: 0
COUGH: 0
BACK PAIN: 0
BLURRED VISION: 0
DIARRHEA: 0
NECK PAIN: 0
CONSTIPATION: 0
DOUBLE VISION: 0
FOCAL WEAKNESS: 0
HEADACHES: 0
FEVER: 0
CHILLS: 0
SHORTNESS OF BREATH: 0
VOMITING: 0

## 2024-05-07 NOTE — PROGRESS NOTES
INFECTIOUS  DISEASE  OUTPATIENT CLINIC  NOTE   Subjective   Primary care provider: Ganesh Benton III, M.D..     Reason for Follow Up:   Follow-up for   No diagnosis found.  HPI: Patient previously seen and treated by ID team as inpatient during hospital admission.   Jama Altman is a 68 y.o. male with hx of afib recent watchman procedure,  CAD, renal transplant 2010 (hx of polycystic kidney disease), HLD, HTN and PAD.Admitted 3/6/2024 for nonhealing wound left axilla. He was recently diagnosed with SCCA after biopsy of left axillary mass  2/23/24. CT chest + partially visualized large left axillary mass and necrotic adenopathy consistent with known SCC.  Patchy groundglass density and subsegmental atelectasis, consistent with chronic inflammatory/infectious process. Drain placed 3/7-cultures of fluid polymicrobial + MRSA (only oral option available is Zyvox), E- faecalis, Fingoldia. Repeat  CT 3/11 : Irregular fluid collection in the LEFT axillary subcutaneous soft tissues measuring 8.3 x 5.1 x 11.1 cm with gas bubbles and drainage catheter in place. IR repositioned and up sized drain on 3/14.  Patient will discharge home on 3/16 with drain in place.  He will follow-up with infectious disease outpatient for drain management and ongoing antibiotics.  Ultimately, patient may require surgical intervention for necrotic tissue that is present.  Patient was discharged home on a 14-day course of p.o. Augmentin 875/125 mg twice daily and p.o. Zyvox 600 mg twice daily.     03/22/24-today the patient reports that he has had having nausea while on both Augmentin and Zyvox.  Due to limited antibiotic options recommend spacing antibiotics by 2 hours and continue taking probiotic to see if he has any relief in symptoms.  He does have as needed Zofran available.  He denies any vomiting, fever, chills, diarrhea.  Left axillary abscess/tumor site with continued drainage.  Patient is keeping a log of drainage output which is  slowing compared to hospital admission.  Continues to have 30 to 50 mL of output a day.  We discussed antibiotic treatment options.  Due to his MRSA and history of renal cell transplant he is limited to only oral Zyvox for antibiotic treatment which can only be used for 2 weeks before causing worsening thrombocytopenia.  Patient already has thrombocytopenia 137.  Augmentin is covering the E faecalis and Feingoldia.  Recommended to finish 14-day course of antibiotic therapy which ends on 3/30/24 as there is no good oral options for long-term suppression.  Due to the patient having a drain in place with high output will hopefully prevent worsening infection.  Recommend ultimately to have surgical intervention as necrotic tissue will become a nidus for infection.  Education provided on signs and symptoms of worsening infection and when to report to ER/call 911.  Repeat lab work CBC/CMP in 1 week for close monitoring.  Previous lab work reviewed and discussed with patient from 3/15/2024, WBC 6.6, stable anemia, thrombocytopenia 133, chronic history of elevated creatinine 2.0 and decreased GFR 36 stable.     4/2/24-patient following up after completion of Augmentin and Zyvox. He denies any vomiting, fever, chills, diarrhea. Most recent labs from 3/29/2024, WBC 7.7, anemia worsening 11.9/38.6, thrombocytopenia 48.  Patient was stopped on Zyvox as he completed antibiotic therapy that day.  CMP with creatinine continuing to improve 1.91and GFR slowly improving 38. Pending repeat labs CMP for today. Drain continues to have 20-50 ml of serosanguinous outpatient a day. Pending start date for immunotherapy. 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 Discussed case  with ID MD Mccoy. No other oral options available for MRSA treatment. Will continue  with PO Augmentin 875/125 mg 1 tab twice daily as well for coverage of E- faecalis, Fingoldia. Orders placed fr CBC/CMP/INR, PICC placement and home infusions.  After daptomycin started will stop Augmentin as Daptomycin will cover E- faecalis, MRSA, and Finegoldia    4/10/24-patient following up while on p.o. Augmentin 875/125 mg twice daily and IV daptomycin 6 Mg/KG once daily with no end date set due to extent of infection.  Stop Augmentin as daptomycin has been shown to have activity against Finegoldia magna. As previously discussed above there is concerns of nidus of infection but patient was unable to have I&D due to, cell carcinoma.  Left axillary wound continues to have drainage in NICKI bulb.  Decreasing erythema and swelling of left axillary wound. Labs reviewed from 4/8/2024, WBC 6.0, stable anemia 11.3/36.4, neutrophils WNL, CMP chronic but elevated creatinine 1.57 trending down, decreased GFR 48 stable, LFTs WNL.  CPK yet to be drawn.  Patient reports he has been tolerating both antibiotics with no complaints of side effects.  He denies any nausea, vomiting, fever, chills, constipation, diarrhea, muscle pains or aches, cramping.  Continue follow-up with oncology and surgery.  Recommended to continue probiotic.      4/19/24-patient following up while on IV daptomycin 6 Mg/KG daily.  Patient reports he is still tolerating IV daptomycin with no complaints of side effects.  He denies any nausea, vomiting, fever, chills, constipation, diarrhea, muscle pains or aches.  Left axillary wound continues to decrease in size NICKI bulb has less output.  Most recent labs reviewed from 4/17/2024, WBC 6.6, stable anemia 11.7/37.1, thrombocytopenia chronic and stable 71, CMP chronically elevated creatinine 1.60 stable, decreased GFR 47 stable, LFTs WNL, CPK 75 and stable.  Continue daily infusions of IV daptomycin and follow-up with  oncology and surgery.  Discussed case with oncology MD Fajardo, outside PET scan shows uptake in T5, possible metastasis versus infection.  Given that patient has been on IV daptomycin and no previous reports of bacteremia more likely metastasis but will wait for MRI of T and L-spine which was ordered by MD Fajardo.  Left axillary NICKI bulb and extension tubing changed in office.  Continues to have decreased swelling and left axillary.  Necrotic tissue with odorous smell, no signs of worsening infection.  Serosanguineous drainage in NICKI bulb 20 mL daily.  Continue with IV daptomycin 6 Mg/KG daily with weekly labs CBC/CMP/CPK.  Ultimately would require I&D of left axillary to remove nidus of infection    4/24/24-patient comes into the clinic today with complaints that his left axillary NICKI drain is having hardly any fluid output but he is having significant drainage from around tube and from wound site.  He does report increasing fatigue and increasing swelling in the left axillary.  He denies any nausea, vomiting, fever, chills, diarrhea, muscle pains or aches.  Repeat CT scan chest with contrast for a left evaluation of drain site.  Possibly may remove drain site if abscess is resolved.  Wound with increasing tissue slough edge and drainage, serosanguineous drainage.  Stat referral placed to wound clinic.  Will decrease dose of IV daptomycin to 4 Mg/KG once daily.  Previous lab work reviewed from 4/23/2024, WBC 7.0, anemia resolved, thrombocytopenia 61.  Possibly due to daptomycin so outpatient hold dose for the next x 2 days then restart daptomycin at 4 Mg/KG.  Creatinine chronically elevated 1.72 and stable, decreased GFR 43 and stable, CPK 75 stable.  Wound dressing supplies provided to patient.    5/3/24-CT scan from 5/2/2024 shows resolution of left axillary fluid collection as there is now a large soft tissue deficit.  There has been an increase in bilateral nodular with infiltrates which may represent either  neoplasm or infection.  Left axillary site still contains necrotic tissue which patient reports still continues to slough.  No signs of active infection.  Stop daptomycin today.  PICC line removed in office.  Pressure held until hemostasis achieved.  Most recent labs reviewed from 4/30/2024, WBC 7.4, stable anemia 10.8/34.4, platelets 111 and trending upwards, CMP with chronic but elevated creatinine 1.84 and GFR 40 slowly improving.  Repeat labs CBC/CMP while off antibiotic therapy in 1 week for close monitoring.  Patient denies any nausea, vomiting, fever, chills, diarrhea.  He does report fatigue and continues to have left arm swelling.  Updated wound team and oncology on plan of care and stopping antibiotic therapy and monitoring.       Current Antimicrobials:   Previous Antimicrobials: Unasyn, Augmentin, Zyvox, Augmentin, daptomycin    Other Current Medications:  Home Medications    Medication Sig Taking? Last Dose Authorizing Provider   metroNIDAZOLE (FLAGYL) 500 MG Tab Crush 1-2 tablets and apply dirrectly into wound bed daily as needed for odor control.   Ze Martins M.D.   predniSONE (DELTASONE) 10 MG Tab Take 20 mg by mouth every day.   Physician Outpatient   Sirolimus 2 MG Tab TAKE 6 TABLETS BY MOUTH ON DAY 1 THEN 3 TABLETS ONCE DAILY THEREAFTER   Physician Outpatient   acetaminophen-codeine #3 (TYLENOL #3) 300-30 MG Tab TAKE 1 TABLET BY MOUTH FOUR TIMES DAILY FOR 6 HOURS AS NEEDED FOR CANCER PAIN   Physician Outpatient   albuterol 108 (90 Base) MCG/ACT Aero Soln inhalation aerosol Inhale 2 Puffs every four hours as needed for Shortness of Breath.   Ganesh Betnon III, M.D.   omeprazole (PRILOSEC) 20 MG delayed-release capsule Take 1 Capsule by mouth every day. Indications: Heartburn   Ganesh Benton III, M.D.   glyBURIDE (DIABETA) 5 MG Tab TAKE 2 TABLETS BY MOUTH TWICE DAILY WITH MEALS   Ganesh Benton III, M.D.   gabapentin (NEURONTIN) 400 MG Cap Take 1 Capsule by mouth 2 times a day.   Spring FELICIANO  MERCED Romero   sodium bicarbonate (SODIUM BICARBONATE) 650 MG Tab Take 1 Tablet by mouth 2 (two) times a day.   Spring Romero M.D.   metoprolol SR (TOPROL XL) 25 MG TABLET SR 24 HR Take 1 Tablet by mouth every day. May take additional one tab daily for heart rate sustaining >110 BPM.   SHARON Cadet   atorvastatin (LIPITOR) 80 MG tablet Take 1 Tablet by mouth at bedtime.   Ganesh Benton III, M.D.   tadalafil (CIALIS) 5 MG tablet : TAKE 1 TABLETS 30 MINUTES BEFORE SEXUAL ACTIVITY, DO NOT EXCEED MORE THAN ONE DOSE A DAY   Ganesh Benton III, M.D.   aspirin 81 MG EC tablet Take 1 Tablet by mouth every day.   JERRICA SpearsP.RCOLETTE   pioglitazone (ACTOS) 45 MG Tab TAKE ONE TABLET BY MOUTH ONCE DAILY   Ganesh Benton III, M.D.   linagliptin (TRADJENTA) 5 MG Tab tablet Take 1 Tablet by mouth every day.   Ganesh Benton III, M.D.        PMH:  Past Medical History:   Diagnosis Date    A-fib (HCC)     Arrhythmia     Benign essential hypertension     Diabetes (HCC)     type 2    Heart burn     Hemorrhagic disorder (HCC)     Eliquis    Hyperlipoproteinemia     Hypertension     not on meds anymore    Indigestion     Myocardial infarct (HCC) 2013    stent    Polycystic kidney 09/10/2010    RIGHT KIDNEY TRANSPLANT    Presence of Watchman left atrial appendage closure device 02/29/2024    Sleep apnea 01/30/2024    states he was told he had sleep apnea but has not had a sleep study    Snoring      Past Surgical History:   Procedure Laterality Date    STENT PLACEMENT  2013    cardiac    KNEE MANIPULATION  02/16/2012    Performed by LATOYA CONNER at SURGERY [facility]    KNEE UNICOMPARTMENTAL  12/23/2011    Performed by LATOYA CONNER at SURGERY [facility]    KNEE ARTHROSCOPY  12/23/2011    Performed by LATOYA CONNER at SURGERY [facility]    KNEE ARTHROSCOPY  05/03/2011    Performed by HANANE GOLDMAN at SURGERY SAME DAY [facility]    MENISCECTOMY, KNEE, MEDIAL  05/03/2011    Performed by  HANANE GOLDMAN at SURGERY SAME DAY [facility]    OTHER  09/10/2010    RIGHT KIDNEY TRANSPLANT    KNEE ARTHROPLASTY TOTAL  01/12/2007    RIGHT    KNEE ARTHROSCOPY  04/10/2006    RIGHT    OTHER ORTHOPEDIC SURGERY  07/08/1974    LEFT KNEE DEBRIDEMENT     No family history on file.  Social History     Socioeconomic History    Marital status: Single     Spouse name: Not on file    Number of children: Not on file    Years of education: Not on file    Highest education level: Not on file   Occupational History    Not on file   Tobacco Use    Smoking status: Never     Passive exposure: Never    Smokeless tobacco: Never   Vaping Use    Vaping Use: Never used   Substance and Sexual Activity    Alcohol use: No    Drug use: No    Sexual activity: Yes     Partners: Female   Other Topics Concern    Not on file   Social History Narrative    Not on file     Social Determinants of Health     Financial Resource Strain: Not on file   Food Insecurity: Not on file   Transportation Needs: Not on file   Physical Activity: Not on file   Stress: Not on file   Social Connections: Not on file   Intimate Partner Violence: Not on file   Housing Stability: Not on file           Allergies/Intolerances:  Allergies   Allergen Reactions    Doxycycline Rash     Sweats and shakes: 9/28/17: Clarified allergy with patient. Allergy was in 1998 and he doesn't remember what happened. He thought the medication is for pain.  Tolerates doxycycline 9/2017       ROS:   Review of Systems   Constitutional:  Positive for malaise/fatigue. Negative for chills, fever and weight loss.   HENT:  Negative for congestion and hearing loss.    Eyes:  Negative for blurred vision and double vision.   Respiratory:  Negative for cough, sputum production, shortness of breath and wheezing.    Cardiovascular:  Negative for chest pain and palpitations.   Gastrointestinal:  Negative for abdominal pain, constipation, diarrhea, nausea and vomiting.   Genitourinary:  Negative for  dysuria.   Musculoskeletal:  Negative for back pain, joint pain, myalgias and neck pain.   Skin:  Negative for itching and rash.   Neurological:  Negative for dizziness, focal weakness and headaches.   Endo/Heme/Allergies:  Does not bruise/bleed easily.   Psychiatric/Behavioral:  The patient is not nervous/anxious.       ROS was reviewed and were negative except as above.    Objective    Most Recent Vital Signs:  There were no vitals taken for this visit.    Physical Exam:  Physical Exam  Vitals reviewed.   Constitutional:       Appearance: Normal appearance.   HENT:      Head: Normocephalic and atraumatic.      Nose: Nose normal.      Mouth/Throat:      Mouth: Mucous membranes are moist.   Eyes:      Pupils: Pupils are equal, round, and reactive to light.   Cardiovascular:      Rate and Rhythm: Normal rate and regular rhythm.   Pulmonary:      Effort: Pulmonary effort is normal.      Breath sounds: Normal breath sounds.   Abdominal:      Palpations: Abdomen is soft.   Musculoskeletal:         General: No tenderness.      Cervical back: Normal range of motion and neck supple.      Comments: Left axillary carcinoma.  Left axillary site still contains necrotic tissue which patient reports still continues to slough.  No signs of active infection   Skin:     General: Skin is warm and dry.      Coloration: Skin is not jaundiced.      Findings: No erythema or rash.      Comments: Left arm edema +1   Neurological:      Mental Status: He is alert and oriented to person, place, and time.      Motor: No weakness.   Psychiatric:         Mood and Affect: Mood normal.         Behavior: Behavior normal.          Pertinent Lab/Imaging Results:  [unfilled]  @CMP@  WBC   Date/Time Value Ref Range Status   05/07/2024 09:37 AM 10.0 4.8 - 10.8 K/uL Final     RBC   Date/Time Value Ref Range Status   05/07/2024 09:37 AM 4.34 (L) 4.70 - 6.10 M/uL Final     Hemoglobin   Date/Time Value Ref Range Status   05/07/2024 09:37 AM 11.9 (L) 14.0 -  18.0 g/dL Final     Hematocrit   Date/Time Value Ref Range Status   05/07/2024 09:37 AM 37.7 (L) 42.0 - 52.0 % Final     MCV   Date/Time Value Ref Range Status   05/07/2024 09:37 AM 86.9 81.4 - 97.8 fL Final     MCH   Date/Time Value Ref Range Status   05/07/2024 09:37 AM 27.4 27.0 - 33.0 pg Final     MCHC   Date/Time Value Ref Range Status   05/07/2024 09:37 AM 31.6 (L) 32.3 - 36.5 g/dL Final     Comment:     Please note new reference range effective 05/22/2023.     MPV   Date/Time Value Ref Range Status   05/07/2024 09:37 AM 11.8 9.0 - 12.9 fL Final      Sodium   Date/Time Value Ref Range Status   04/30/2024 01:30  (L) 135 - 145 mmol/L Final     Potassium   Date/Time Value Ref Range Status   04/30/2024 01:30 PM 4.8 3.6 - 5.5 mmol/L Final     Chloride   Date/Time Value Ref Range Status   04/30/2024 01:30  96 - 112 mmol/L Final     Co2   Date/Time Value Ref Range Status   04/30/2024 01:30 PM 20 20 - 33 mmol/L Final     Glucose   Date/Time Value Ref Range Status   04/30/2024 01:30  (H) 65 - 99 mg/dL Final     Bun   Date/Time Value Ref Range Status   04/30/2024 01:30 PM 44 (H) 8 - 22 mg/dL Final     Creatinine   Date/Time Value Ref Range Status   04/30/2024 01:30 PM 1.84 (H) 0.50 - 1.40 mg/dL Final   12/18/2006 06:20 AM 2.4 (H) 0.5 - 1.4 mg/dL Final     Alkaline Phosphatase   Date/Time Value Ref Range Status   04/30/2024 01:30 PM 69 30 - 99 U/L Final     AST(SGOT)   Date/Time Value Ref Range Status   04/30/2024 01:30 PM 13 12 - 45 U/L Final     ALT(SGPT)   Date/Time Value Ref Range Status   04/30/2024 01:30 PM 19 2 - 50 U/L Final     Total Bilirubin   Date/Time Value Ref Range Status   04/30/2024 01:30 PM 0.2 0.1 - 1.5 mg/dL Final      CPK Total   Date/Time Value Ref Range Status   04/30/2024 01:30 PM 77 0 - 154 U/L Final            Blood Culture   Date Value Ref Range Status   09/27/2017 No growth after 5 days of incubation.  Final     Blood Culture Hold   Date Value Ref Range Status   09/27/2017  Collected  Final       Blood Culture   Date Value Ref Range Status   09/27/2017 No growth after 5 days of incubation.  Final     Blood Culture Hold   Date Value Ref Range Status   09/27/2017 Collected  Final          CULTURE WOUND W/ GRAM STAIN  Order: [id number] - Reflex for Order [id number]   Status: Edited Result - FINAL       Visible to patient: Yes (not seen)       Next appt: 03/29/2024 at 03:00 PM in Infectious Diseases (Ovidio Linton A.P.R.N.)    Specimen Information: Wound   0 Result Notes      Component 2 wk ago   Significant Indicator POS Positive (POS)   Source WND   Site L Axillary Fluid   Culture Result - Abnormal    Gram Stain Result Rare WBCs.  Moderate Gram positive cocci.   Culture Result  Abnormal   Methicillin Resistant Staphylococcus aureus  Heavy growth  This isolate is presumed to be clindamycin resistant based on  detection of inducible resistance.    Culture Result  Abnormal   Enterococcus faecalis  Heavy growth    Resulting Agency M        Susceptibility     Methicillin resistant staphylococcus aureus Enterococcus faecalis     CESIA CESIA     Ampicillin   <=2 mcg/mL Sensitive     Ampicillin/sulbactam <=8/4 mcg/mL Resistant       Cefazolin <=8 mcg/mL Resistant       Cefepime 8 mcg/mL Resistant       Clindamycin 0.5 mcg/mL Resistant       Daptomycin <=0.5 mcg/mL Sensitive 2 mcg/mL Sensitive     Erythromycin >4 mcg/mL Resistant       Gent Synergy   <=500 mcg/mL Sensitive     Linezolid 2 mcg/mL Sensitive 2 mcg/mL Sensitive     Oxacillin >2 mcg/mL Resistant       Penicillin   2 mcg/mL Sensitive     Tetracycline >8 mcg/mL Resistant >8 mcg/mL Resistant     Trimeth/Sulfa <=0.5/9.5 m... Sensitive       Vancomycin 1 mcg/mL Sensitive 1 mcg/mL Sensitive                    Narrative  Performed by: M  Left axillary fluid aspirate  Left axillary fluid aspirate      Specimen Collected: 03/07/24 12:06 PM Last Resulted: 03/10/24  8:46 AM           CULTURE WOUND W/ GRAM STAIN  Order: [id number] - Reflex for Order  [id number]   Status: Edited Result - FINAL       Visible to patient: Yes (not seen)       Next appt: 03/29/2024 at 03:00 PM in Infectious Diseases (JERRICA GandhiP.RRudiNRudi)    Specimen Information: Wound   0 Result Notes      Component 2 wk ago   Significant Indicator POS Positive (POS)   Source WND   Site L Axillary Fluid   Culture Result - Abnormal    Gram Stain Result Rare WBCs.  Moderate Gram positive cocci.   Culture Result  Abnormal   Methicillin Resistant Staphylococcus aureus  Heavy growth  This isolate is presumed to be clindamycin resistant based on  detection of inducible resistance.    Culture Result  Abnormal   Enterococcus faecalis  Heavy growth    Resulting Agency M        Susceptibility     Methicillin resistant staphylococcus aureus Enterococcus faecalis     CESIA CESIA     Ampicillin   <=2 mcg/mL Sensitive     Ampicillin/sulbactam <=8/4 mcg/mL Resistant       Cefazolin <=8 mcg/mL Resistant       Cefepime 8 mcg/mL Resistant       Clindamycin 0.5 mcg/mL Resistant       Daptomycin <=0.5 mcg/mL Sensitive 2 mcg/mL Sensitive     Erythromycin >4 mcg/mL Resistant       Gent Synergy   <=500 mcg/mL Sensitive     Linezolid 2 mcg/mL Sensitive 2 mcg/mL Sensitive     Oxacillin >2 mcg/mL Resistant       Penicillin   2 mcg/mL Sensitive     Tetracycline >8 mcg/mL Resistant >8 mcg/mL Resistant     Trimeth/Sulfa <=0.5/9.5 m... Sensitive       Vancomycin 1 mcg/mL Sensitive 1 mcg/mL Sensitive                    Narrative  Performed by: M  Left axillary fluid aspirate  Left axillary fluid aspirate      Specimen Collected: 03/07/24 12:06 PM Last Resulted: 03/10/24  8:46 AM        Lab Flowsheet        Order Details        View Encounter        Lab and Collection Details        Routing        Result History     View All Conversations on this Encounter           Result Care Coordination      Patient Communication     Add Comments   Not seen Back to Top          Contains abnormal data Anaerobic Culture  Order: [id number] - Reflex  for Order [id number]   Status: Final result         Visible to patient: Yes (not seen)         Next appt: 03/29/2024 at 03:00 PM in Infectious Diseases (JENNIFER GandhiRCOLETTE)      Specimen Information: Wound   0 Result Notes          Component 2 wk ago   Significant Indicator POS Positive (POS)   Source WND   Site L Axillary Fluid   Culture Result - Abnormal    Culture Result  Abnormal   Finegoldia magna  Heavy growth    Resulting Agency M              Narrative  Performed by: M  Left axillary fluid aspirate  Left axillary fluid aspirate      Specimen Collected: 03/07/24 12:06 PM Last Resulted: 03/10/24  1:58 PM              Impression/Assessment      No diagnosis found.        Jama Altman is a 68 y.o. male with hx of afib recent watchman procedure,  CAD, renal transplant 2010 (hx of polycystic kidney disease), HLD, HTN and PAD.Admitted 3/6/2024 for nonhealing wound left axilla. He was recently diagnosed with SCCA after biopsy of left axillary mass  2/23/24. CT chest + partially visualized large left axillary mass and necrotic adenopathy consistent with known SCC.  Patchy groundglass density and subsegmental atelectasis, consistent with chronic inflammatory/infectious process. Drain placed 3/7-cultures of fluid polymicrobial + MRSA (only oral option available is Zyvox), E- faecalis, Fingoldia. Repeat  CT 3/11 : Irregular fluid collection in the LEFT axillary subcutaneous soft tissues measuring 8.3 x 5.1 x 11.1 cm with gas bubbles and drainage catheter in place. IR repositioned and up sized drain on 3/14.  Patient will discharge home on 3/16 with drain in place.  He will follow-up with infectious disease outpatient for drain management and ongoing antibiotics.  Ultimately, patient may require surgical intervention for necrotic tissue that is present.  Patient was discharged home on a 14-day course of p.o. Augmentin 875/125 mg twice daily and p.o. Zyvox 600 mg twice daily.  Due to concerns of nidus of  infection, unable to have I&D due to squamous cell carcinoma, and pending start date for immunotherapy, will start patient on an indefinite course of IV daptomycin 6 mg/kg ( renal dosed) once daily until surgical plan can be established or repeat CT scan shows resolution of abscess. Discussed case with ROCKY Mccoy. No other oral options available for MRSA treatment.  IV daptomycin 6 Mg/KG once daily 6-week course with possible end date 5/21/2024 with a possibility of extending      5/3/24-CT scan from 5/2/2024 shows resolution of left axillary fluid collection as there is now a large soft tissue deficit.  There has been an increase in bilateral nodular with infiltrates which may represent either neoplasm or infection.  Left axillary site still contains necrotic tissue which patient reports still continues to slough.  No signs of active infection.  Stop daptomycin today.  PICC line removed in office.  Pressure held until hemostasis achieved.  Most recent labs reviewed from 4/30/2024, WBC 7.4, stable anemia 10.8/34.4, platelets 111 and trending upwards, CMP with chronic but elevated creatinine 1.84 and GFR 40 slowly improving.  Repeat labs CBC/CMP while off antibiotic therapy in 1 week for close monitoring.  Patient denies any nausea, vomiting, fever, chills, diarrhea.  He does report fatigue and continues to have left arm swelling.  Updated wound team and oncology on plan of care and stopping antibiotic therapy and monitoring.   - Due to his MRSA and history of renal cell transplant he is limited to only oral Zyvox for antibiotic treatment which can only be used for 2 weeks before causing worsening thrombocytopenia.  No good oral options for long-term suppression.  Recommend ultimately to have surgical intervention as necrotic tissue will become a nidus for infection.    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   Repeat CT scan shows no axillary abscess.   -  Repeat lab work CBC/CMP in 1 week   -  Recommend routine follow up with oncology and wound team  -  Continue wound care to left arm.    -  Education provided on S/S of infection and when to report to ER/ call 911       Return visit: 1 weeks. Follow up with primary care physician for chronic medical problems      I have performed a physical exam,  updated ROS and plan today. I have reviewed previous images, labs, and provider notes.      BITA Gandhi.    All Patients should seek medical re-evaluation or report to the ER for new, increasing or worsening symptoms. In some circumstances medical conditions can change from the initial evaluation and may require emergent medical re-evaluation. This includes but is not limited to chest pain, shortness of breath, atypical abdominal pain, atypical headache, ALOC, fever >101, low blood pressure, high respiratory rate (above 30), low oxygen saturation (below 90%), acute delirium, abnormal bleeding, inability to tolerate any intake, weakness on one side of the body, any worsened or concerning conditions.    Please note that this dictation was created using voice recognition software. I have worked with technical experts from [employer] to optimize the interface.  I have made every reasonable attempt to correct obvious errors, but there may be errors of grammar and possibly content that I did not discover before finalizing the note.

## 2024-05-08 ENCOUNTER — OFFICE VISIT (OUTPATIENT)
Dept: INFECTIOUS DISEASES | Facility: MEDICAL CENTER | Age: 69
End: 2024-05-08
Attending: NURSE PRACTITIONER
Payer: MEDICARE

## 2024-05-08 VITALS
HEART RATE: 91 BPM | HEIGHT: 77 IN | RESPIRATION RATE: 16 BRPM | OXYGEN SATURATION: 94 % | SYSTOLIC BLOOD PRESSURE: 104 MMHG | WEIGHT: 234 LBS | BODY MASS INDEX: 27.63 KG/M2 | DIASTOLIC BLOOD PRESSURE: 60 MMHG | TEMPERATURE: 97.3 F

## 2024-05-08 DIAGNOSIS — Z94.0 HISTORY OF RENAL TRANSPLANT: ICD-10-CM

## 2024-05-08 DIAGNOSIS — D69.6 THROMBOCYTOPENIA (HCC): ICD-10-CM

## 2024-05-08 DIAGNOSIS — A49.9 POLYMICROBIAL BACTERIAL INFECTION: ICD-10-CM

## 2024-05-08 DIAGNOSIS — D84.9 IMMUNOSUPPRESSED STATUS (HCC): ICD-10-CM

## 2024-05-08 DIAGNOSIS — N18.32 STAGE 3B CHRONIC KIDNEY DISEASE: ICD-10-CM

## 2024-05-08 DIAGNOSIS — C44.92 SQUAMOUS CELL CARCINOMA OF SKIN: ICD-10-CM

## 2024-05-08 DIAGNOSIS — D63.8 ANEMIA OF CHRONIC DISEASE: ICD-10-CM

## 2024-05-08 PROCEDURE — 3078F DIAST BP <80 MM HG: CPT | Performed by: NURSE PRACTITIONER

## 2024-05-08 PROCEDURE — 99212 OFFICE O/P EST SF 10 MIN: CPT | Performed by: NURSE PRACTITIONER

## 2024-05-08 PROCEDURE — 3074F SYST BP LT 130 MM HG: CPT | Performed by: NURSE PRACTITIONER

## 2024-05-08 ASSESSMENT — ENCOUNTER SYMPTOMS
NERVOUS/ANXIOUS: 0
SHORTNESS OF BREATH: 0
VOMITING: 0
BLURRED VISION: 0
WHEEZING: 0
PALPITATIONS: 0
SPUTUM PRODUCTION: 0
BACK PAIN: 0
NAUSEA: 0
WEIGHT LOSS: 0
CONSTIPATION: 0
FOCAL WEAKNESS: 0
ABDOMINAL PAIN: 0
BRUISES/BLEEDS EASILY: 0
FEVER: 0
DOUBLE VISION: 0
MYALGIAS: 0
DIARRHEA: 0
COUGH: 1
HEADACHES: 0
NECK PAIN: 0
DIZZINESS: 0
CHILLS: 0

## 2024-05-08 ASSESSMENT — FIBROSIS 4 INDEX: FIB4 SCORE: 2.06

## 2024-05-08 NOTE — PROGRESS NOTES
INFECTIOUS  DISEASE  OUTPATIENT CLINIC  NOTE   Subjective   Primary care provider: Ganesh Benton III, M.D..     Reason for Follow Up:   Follow-up for   1. Polymicrobial bacterial infection        2. Squamous cell carcinoma of skin        3. Immunosuppressed status (HCC)  CBC WITH DIFFERENTIAL      4. Anemia of chronic disease        5. Stage 3b chronic kidney disease        6. History of renal transplant        7. Thrombocytopenia (HCC)          HPI: Patient previously seen and treated by ID team as inpatient during hospital admission.   Jama Altman is a 68 y.o. male with hx of afib recent watchman procedure,  CAD, renal transplant 2010 (hx of polycystic kidney disease), HLD, HTN and PAD.Admitted 3/6/2024 for nonhealing wound left axilla. He was recently diagnosed with SCCA after biopsy of left axillary mass  2/23/24. CT chest + partially visualized large left axillary mass and necrotic adenopathy consistent with known SCC.  Patchy groundglass density and subsegmental atelectasis, consistent with chronic inflammatory/infectious process. Drain placed 3/7-cultures of fluid polymicrobial + MRSA (only oral option available is Zyvox), E- faecalis, Fingoldia. Repeat  CT 3/11 : Irregular fluid collection in the LEFT axillary subcutaneous soft tissues measuring 8.3 x 5.1 x 11.1 cm with gas bubbles and drainage catheter in place. IR repositioned and up sized drain on 3/14.  Patient will discharge home on 3/16 with drain in place.  He will follow-up with infectious disease outpatient for drain management and ongoing antibiotics.  Ultimately, patient may require surgical intervention for necrotic tissue that is present.  Patient was discharged home on a 14-day course of p.o. Augmentin 875/125 mg twice daily and p.o. Zyvox 600 mg twice daily.     03/22/24-today the patient reports that he has had having nausea while on both Augmentin and Zyvox.  Due to limited antibiotic options recommend spacing antibiotics by 2 hours  and continue taking probiotic to see if he has any relief in symptoms.  He does have as needed Zofran available.  He denies any vomiting, fever, chills, diarrhea.  Left axillary abscess/tumor site with continued drainage.  Patient is keeping a log of drainage output which is slowing compared to hospital admission.  Continues to have 30 to 50 mL of output a day.  We discussed antibiotic treatment options.  Due to his MRSA and history of renal cell transplant he is limited to only oral Zyvox for antibiotic treatment which can only be used for 2 weeks before causing worsening thrombocytopenia.  Patient already has thrombocytopenia 137.  Augmentin is covering the E faecalis and Feingoldia.  Recommended to finish 14-day course of antibiotic therapy which ends on 3/30/24 as there is no good oral options for long-term suppression.  Due to the patient having a drain in place with high output will hopefully prevent worsening infection.  Recommend ultimately to have surgical intervention as necrotic tissue will become a nidus for infection.  Education provided on signs and symptoms of worsening infection and when to report to ER/call 911.  Repeat lab work CBC/CMP in 1 week for close monitoring.  Previous lab work reviewed and discussed with patient from 3/15/2024, WBC 6.6, stable anemia, thrombocytopenia 133, chronic history of elevated creatinine 2.0 and decreased GFR 36 stable.     4/2/24-patient following up after completion of Augmentin and Zyvox. He denies any vomiting, fever, chills, diarrhea. Most recent labs from 3/29/2024, WBC 7.7, anemia worsening 11.9/38.6, thrombocytopenia 48.  Patient was stopped on Zyvox as he completed antibiotic therapy that day.  CMP with creatinine continuing to improve 1.91and GFR slowly improving 38. Pending repeat labs CMP for today. Drain continues to have 20-50 ml of serosanguinous outpatient a day. Pending start date for immunotherapy. Due to concerns of nidus of infection, unable to  have I&D due to squamous cell carcinoma, and pending start date for immunotherapy, will start patient on an indefinite course of IV daptomycin 6 mg/kg ( renal dosed) once daily until surgical plan can be established or repeat CT scan shows resolution of abscess. Discussed case with ID MD Mccoy. No other oral options available for MRSA treatment. Will continue  with PO Augmentin 875/125 mg 1 tab twice daily as well for coverage of E- faecalis, Fingoldia. Orders placed fr CBC/CMP/INR, PICC placement and home infusions.  After daptomycin started will stop Augmentin as Daptomycin will cover E- faecalis, MRSA, and Finegoldia    4/10/24-patient following up while on p.o. Augmentin 875/125 mg twice daily and IV daptomycin 6 Mg/KG once daily with no end date set due to extent of infection.  Stop Augmentin as daptomycin has been shown to have activity against Finegoldia magna. As previously discussed above there is concerns of nidus of infection but patient was unable to have I&D due to, cell carcinoma.  Left axillary wound continues to have drainage in NICKI bulb.  Decreasing erythema and swelling of left axillary wound. Labs reviewed from 4/8/2024, WBC 6.0, stable anemia 11.3/36.4, neutrophils WNL, CMP chronic but elevated creatinine 1.57 trending down, decreased GFR 48 stable, LFTs WNL.  CPK yet to be drawn.  Patient reports he has been tolerating both antibiotics with no complaints of side effects.  He denies any nausea, vomiting, fever, chills, constipation, diarrhea, muscle pains or aches, cramping.  Continue follow-up with oncology and surgery.  Recommended to continue probiotic.      4/19/24-patient following up while on IV daptomycin 6 Mg/KG daily.  Patient reports he is still tolerating IV daptomycin with no complaints of side effects.  He denies any nausea, vomiting, fever, chills, constipation, diarrhea, muscle pains or aches.  Left axillary wound continues to decrease in size NICKI bulb has less output.  Most  recent labs reviewed from 4/17/2024, WBC 6.6, stable anemia 11.7/37.1, thrombocytopenia chronic and stable 71, CMP chronically elevated creatinine 1.60 stable, decreased GFR 47 stable, LFTs WNL, CPK 75 and stable.  Continue daily infusions of IV daptomycin and follow-up with oncology and surgery.  Discussed case with oncology MD Fajardo, outside PET scan shows uptake in T5, possible metastasis versus infection.  Given that patient has been on IV daptomycin and no previous reports of bacteremia more likely metastasis but will wait for MRI of T and L-spine which was ordered by MD Fajardo.  Left axillary NICKI bulb and extension tubing changed in office.  Continues to have decreased swelling and left axillary.  Necrotic tissue with odorous smell, no signs of worsening infection.  Serosanguineous drainage in NICKI bulb 20 mL daily.  Continue with IV daptomycin 6 Mg/KG daily with weekly labs CBC/CMP/CPK.  Ultimately would require I&D of left axillary to remove nidus of infection    4/24/24-patient comes into the clinic today with complaints that his left axillary NICKI drain is having hardly any fluid output but he is having significant drainage from around tube and from wound site.  He does report increasing fatigue and increasing swelling in the left axillary.  He denies any nausea, vomiting, fever, chills, diarrhea, muscle pains or aches.  Repeat CT scan chest with contrast for a left evaluation of drain site.  Possibly may remove drain site if abscess is resolved.  Wound with increasing tissue slough edge and drainage, serosanguineous drainage.  Stat referral placed to wound clinic.  Will decrease dose of IV daptomycin to 4 Mg/KG once daily.  Previous lab work reviewed from 4/23/2024, WBC 7.0, anemia resolved, thrombocytopenia 61.  Possibly due to daptomycin so outpatient hold dose for the next x 2 days then restart daptomycin at 4 Mg/KG.  Creatinine chronically elevated 1.72 and stable, decreased GFR 43 and stable, CPK 75  stable.  Wound dressing supplies provided to patient.    5/3/24-CT scan from 5/2/2024 shows resolution of left axillary fluid collection as there is now a large soft tissue deficit.  There has been an increase in bilateral nodular with infiltrates which may represent either neoplasm or infection.  Left axillary site still contains necrotic tissue which patient reports still continues to slough.  No signs of active infection.  Stop daptomycin today.  PICC line removed in office.  Pressure held until hemostasis achieved.  Most recent labs reviewed from 4/30/2024, WBC 7.4, stable anemia 10.8/34.4, platelets 111 and trending upwards, CMP with chronic but elevated creatinine 1.84 and GFR 40 slowly improving.  Repeat labs CBC/CMP while off antibiotic therapy in 1 week for close monitoring.  Patient denies any nausea, vomiting, fever, chills, diarrhea.  He does report fatigue and continues to have left arm swelling.  Updated wound team and oncology on plan of care and stopping antibiotic therapy and monitoring.     5/8/24-patient following up while off antibiotic therapy for continuous monitoring.  Left axillary site with soft pink tissue.  Serosanguineous drainage.  Continues to have necrotic tissue slough.  No signs of active infection.  Most recent labs while off antibiotic therapy from 5/7/2024 WBC 10.0, stable anemia 11.9/37.7, chronic thrombocytopenia 142 and improving, elevation in neutrophils, CMP creatinine 1.57 chronic and stable GFR 48 chronic and stable LFTs WNL.  Repeat lab work CBC plus differential in 7 days for continuous monitoring.  Education provided on signs and symptoms of reoccurrence of infection and when to report to ER/call 911.      Current Antimicrobials: None  Previous Antimicrobials: Unasyn, Augmentin, Zyvox, Augmentin, daptomycin    Other Current Medications:  Home Medications    Medication Sig Taking? Last Dose Authorizing Provider   metroNIDAZOLE (FLAGYL) 500 MG Tab Crush 1-2 tablets and  apply dirrectly into wound bed daily as needed for odor control. Yes Taking Ze Martins M.D.   predniSONE (DELTASONE) 10 MG Tab Take 20 mg by mouth every day. Yes Taking Physician Outpatient   Sirolimus 2 MG Tab TAKE 6 TABLETS BY MOUTH ON DAY 1 THEN 3 TABLETS ONCE DAILY THEREAFTER Yes Taking Physician Outpatient   acetaminophen-codeine #3 (TYLENOL #3) 300-30 MG Tab TAKE 1 TABLET BY MOUTH FOUR TIMES DAILY FOR 6 HOURS AS NEEDED FOR CANCER PAIN Yes Taking Physician Outpatient   albuterol 108 (90 Base) MCG/ACT Aero Soln inhalation aerosol Inhale 2 Puffs every four hours as needed for Shortness of Breath. Yes Taking Ganesh Benton III, M.D.   omeprazole (PRILOSEC) 20 MG delayed-release capsule Take 1 Capsule by mouth every day. Indications: Heartburn Yes Taking Ganesh Benton III, M.D.   glyBURIDE (DIABETA) 5 MG Tab TAKE 2 TABLETS BY MOUTH TWICE DAILY WITH MEALS Yes Taking Ganesh Benton III, M.D.   gabapentin (NEURONTIN) 400 MG Cap Take 1 Capsule by mouth 2 times a day. Yes Taking Spring Romero M.D.   sodium bicarbonate (SODIUM BICARBONATE) 650 MG Tab Take 1 Tablet by mouth 2 (two) times a day. Yes Taking Spring Romero M.D.   metoprolol SR (TOPROL XL) 25 MG TABLET SR 24 HR Take 1 Tablet by mouth every day. May take additional one tab daily for heart rate sustaining >110 BPM. Yes Taking JERRICA CadetPRudiRCOLETTE   atorvastatin (LIPITOR) 80 MG tablet Take 1 Tablet by mouth at bedtime. Yes Taking Ganesh Benton III, M.D.   tadalafil (CIALIS) 5 MG tablet : TAKE 1 TABLETS 30 MINUTES BEFORE SEXUAL ACTIVITY, DO NOT EXCEED MORE THAN ONE DOSE A DAY Yes Taking Ganesh Benton III, M.D.   aspirin 81 MG EC tablet Take 1 Tablet by mouth every day. Yes Taking JERRICA SpearsP.RCOLETTE   pioglitazone (ACTOS) 45 MG Tab TAKE ONE TABLET BY MOUTH ONCE DAILY Yes Taking Ganesh Benton III, M.D.   linagliptin (TRADJENTA) 5 MG Tab tablet Take 1 Tablet by mouth every day. Yes Taking Ganesh Benton III, M.D.        PMH:  Past  Medical History:   Diagnosis Date    A-fib (HCC)     Arrhythmia     Benign essential hypertension     Diabetes (HCC)     type 2    Heart burn     Hemorrhagic disorder (HCC)     Eliquis    Hyperlipoproteinemia     Hypertension     not on meds anymore    Indigestion     Myocardial infarct (HCC) 2013    stent    Polycystic kidney 09/10/2010    RIGHT KIDNEY TRANSPLANT    Presence of Watchman left atrial appendage closure device 02/29/2024    Sleep apnea 01/30/2024    states he was told he had sleep apnea but has not had a sleep study    Snoring      Past Surgical History:   Procedure Laterality Date    STENT PLACEMENT  2013    cardiac    KNEE MANIPULATION  02/16/2012    Performed by LATOYA CONNER at SURGERY [facility]    KNEE UNICOMPARTMENTAL  12/23/2011    Performed by LATOYA CONNER at SURGERY [facility]    KNEE ARTHROSCOPY  12/23/2011    Performed by LATOYA CONNER at SURGERY [facility]    KNEE ARTHROSCOPY  05/03/2011    Performed by HANANE GOLDMAN at SURGERY SAME DAY [facility]    MENISCECTOMY, KNEE, MEDIAL  05/03/2011    Performed by HANANE GOLDMAN at SURGERY SAME DAY [facility]    OTHER  09/10/2010    RIGHT KIDNEY TRANSPLANT    KNEE ARTHROPLASTY TOTAL  01/12/2007    RIGHT    KNEE ARTHROSCOPY  04/10/2006    RIGHT    OTHER ORTHOPEDIC SURGERY  07/08/1974    LEFT KNEE DEBRIDEMENT     History reviewed. No pertinent family history.  Social History     Socioeconomic History    Marital status: Single     Spouse name: Not on file    Number of children: Not on file    Years of education: Not on file    Highest education level: Not on file   Occupational History    Not on file   Tobacco Use    Smoking status: Never     Passive exposure: Never    Smokeless tobacco: Never   Vaping Use    Vaping Use: Never used   Substance and Sexual Activity    Alcohol use: No    Drug use: No    Sexual activity: Yes     Partners: Female   Other Topics Concern    Not on file   Social History Narrative    Not on file  "    Social Determinants of Health     Financial Resource Strain: Not on file   Food Insecurity: Not on file   Transportation Needs: Not on file   Physical Activity: Not on file   Stress: Not on file   Social Connections: Not on file   Intimate Partner Violence: Not on file   Housing Stability: Not on file           Allergies/Intolerances:  Allergies   Allergen Reactions    Doxycycline Rash     Sweats and shakes: 9/28/17: Clarified allergy with patient. Allergy was in 1998 and he doesn't remember what happened. He thought the medication is for pain.  Tolerates doxycycline 9/2017       ROS:   Review of Systems   Constitutional:  Positive for malaise/fatigue. Negative for chills, fever and weight loss.   HENT:  Negative for congestion and hearing loss.    Eyes:  Negative for blurred vision and double vision.   Respiratory:  Positive for cough. Negative for sputum production, shortness of breath and wheezing.    Cardiovascular:  Negative for chest pain and palpitations.   Gastrointestinal:  Negative for abdominal pain, constipation, diarrhea, nausea and vomiting.   Genitourinary:  Negative for dysuria.   Musculoskeletal:  Negative for back pain, joint pain, myalgias and neck pain.   Skin:  Negative for itching and rash.   Neurological:  Negative for dizziness, focal weakness and headaches.   Endo/Heme/Allergies:  Does not bruise/bleed easily.   Psychiatric/Behavioral:  The patient is not nervous/anxious.       ROS was reviewed and were negative except as above.    Objective    Most Recent Vital Signs:  /60 (BP Location: Right arm, Patient Position: Sitting, BP Cuff Size: Adult)   Pulse 91   Temp 36.3 °C (97.3 °F) (Temporal)   Resp 16   Ht 1.956 m (6' 5\")   Wt 106 kg (234 lb)   SpO2 94%   BMI 27.75 kg/m²     Physical Exam:  Physical Exam  Vitals reviewed.   Constitutional:       Appearance: Normal appearance.   HENT:      Head: Normocephalic and atraumatic.      Nose: Nose normal.      Mouth/Throat:      " Mouth: Mucous membranes are moist.   Eyes:      Pupils: Pupils are equal, round, and reactive to light.   Cardiovascular:      Rate and Rhythm: Normal rate and regular rhythm.   Pulmonary:      Effort: Pulmonary effort is normal. No respiratory distress.      Breath sounds: Normal breath sounds. No stridor. No wheezing, rhonchi or rales.   Chest:      Chest wall: No tenderness.   Abdominal:      Palpations: Abdomen is soft.   Musculoskeletal:         General: No tenderness.      Cervical back: Normal range of motion and neck supple.      Comments: Left axillary carcinoma.  Left axillary site still contains necrotic tissue which patient reports still continues to slough.  No signs of active infection   Skin:     General: Skin is warm and dry.      Coloration: Skin is not jaundiced.      Findings: No erythema or rash.      Comments: Left arm edema +1   Neurological:      Mental Status: He is alert and oriented to person, place, and time.      Motor: No weakness.   Psychiatric:         Mood and Affect: Mood normal.         Behavior: Behavior normal.          Pertinent Lab/Imaging Results:  [unfilled]  @CMP@  WBC   Date/Time Value Ref Range Status   05/07/2024 09:37 AM 10.0 4.8 - 10.8 K/uL Final     RBC   Date/Time Value Ref Range Status   05/07/2024 09:37 AM 4.34 (L) 4.70 - 6.10 M/uL Final     Hemoglobin   Date/Time Value Ref Range Status   05/07/2024 09:37 AM 11.9 (L) 14.0 - 18.0 g/dL Final     Hematocrit   Date/Time Value Ref Range Status   05/07/2024 09:37 AM 37.7 (L) 42.0 - 52.0 % Final     MCV   Date/Time Value Ref Range Status   05/07/2024 09:37 AM 86.9 81.4 - 97.8 fL Final     MCH   Date/Time Value Ref Range Status   05/07/2024 09:37 AM 27.4 27.0 - 33.0 pg Final     MCHC   Date/Time Value Ref Range Status   05/07/2024 09:37 AM 31.6 (L) 32.3 - 36.5 g/dL Final     Comment:     Please note new reference range effective 05/22/2023.     MPV   Date/Time Value Ref Range Status   05/07/2024 09:37 AM 11.8 9.0 - 12.9 fL  Final      Sodium   Date/Time Value Ref Range Status   05/07/2024 09:37  135 - 145 mmol/L Final     Potassium   Date/Time Value Ref Range Status   05/07/2024 09:37 AM 4.5 3.6 - 5.5 mmol/L Final     Chloride   Date/Time Value Ref Range Status   05/07/2024 09:37  96 - 112 mmol/L Final     Co2   Date/Time Value Ref Range Status   05/07/2024 09:37 AM 24 20 - 33 mmol/L Final     Glucose   Date/Time Value Ref Range Status   05/07/2024 09:37  (H) 65 - 99 mg/dL Final     Bun   Date/Time Value Ref Range Status   05/07/2024 09:37 AM 30 (H) 8 - 22 mg/dL Final     Creatinine   Date/Time Value Ref Range Status   05/07/2024 09:37 AM 1.57 (H) 0.50 - 1.40 mg/dL Final   12/18/2006 06:20 AM 2.4 (H) 0.5 - 1.4 mg/dL Final     Alkaline Phosphatase   Date/Time Value Ref Range Status   05/07/2024 09:37 AM 76 30 - 99 U/L Final     AST(SGOT)   Date/Time Value Ref Range Status   05/07/2024 09:37 AM 18 12 - 45 U/L Final     ALT(SGPT)   Date/Time Value Ref Range Status   05/07/2024 09:37 AM 17 2 - 50 U/L Final     Total Bilirubin   Date/Time Value Ref Range Status   05/07/2024 09:37 AM 0.3 0.1 - 1.5 mg/dL Final      CPK Total   Date/Time Value Ref Range Status   04/30/2024 01:30 PM 77 0 - 154 U/L Final      Recent Labs     05/07/24  0937   ASTSGOT 18   ALTSGPT 17   TBILIRUBIN 0.3   ALKPHOSPHAT 76   GLOBULIN 2.4     Blood Culture   Date Value Ref Range Status   09/27/2017 No growth after 5 days of incubation.  Final     Blood Culture Hold   Date Value Ref Range Status   09/27/2017 Collected  Final       Blood Culture   Date Value Ref Range Status   09/27/2017 No growth after 5 days of incubation.  Final     Blood Culture Hold   Date Value Ref Range Status   09/27/2017 Collected  Final          CULTURE WOUND W/ GRAM STAIN  Order: [id number] - Reflex for Order [id number]   Status: Edited Result - FINAL       Visible to patient: Yes (not seen)       Next appt: 03/29/2024 at 03:00 PM in Infectious Diseases (SHARON Gandhi)     Specimen Information: Wound   0 Result Notes      Component 2 wk ago   Significant Indicator POS Positive (POS)   Source WND   Site L Axillary Fluid   Culture Result - Abnormal    Gram Stain Result Rare WBCs.  Moderate Gram positive cocci.   Culture Result  Abnormal   Methicillin Resistant Staphylococcus aureus  Heavy growth  This isolate is presumed to be clindamycin resistant based on  detection of inducible resistance.    Culture Result  Abnormal   Enterococcus faecalis  Heavy growth    Resulting Agency M        Susceptibility     Methicillin resistant staphylococcus aureus Enterococcus faecalis     CESIA CESIA     Ampicillin   <=2 mcg/mL Sensitive     Ampicillin/sulbactam <=8/4 mcg/mL Resistant       Cefazolin <=8 mcg/mL Resistant       Cefepime 8 mcg/mL Resistant       Clindamycin 0.5 mcg/mL Resistant       Daptomycin <=0.5 mcg/mL Sensitive 2 mcg/mL Sensitive     Erythromycin >4 mcg/mL Resistant       Gent Synergy   <=500 mcg/mL Sensitive     Linezolid 2 mcg/mL Sensitive 2 mcg/mL Sensitive     Oxacillin >2 mcg/mL Resistant       Penicillin   2 mcg/mL Sensitive     Tetracycline >8 mcg/mL Resistant >8 mcg/mL Resistant     Trimeth/Sulfa <=0.5/9.5 m... Sensitive       Vancomycin 1 mcg/mL Sensitive 1 mcg/mL Sensitive                    Narrative  Performed by: BENEDICT  Left axillary fluid aspirate  Left axillary fluid aspirate      Specimen Collected: 03/07/24 12:06 PM Last Resulted: 03/10/24  8:46 AM           CULTURE WOUND W/ GRAM STAIN  Order: [id number] - Reflex for Order [id number]   Status: Edited Result - FINAL       Visible to patient: Yes (not seen)       Next appt: 03/29/2024 at 03:00 PM in Infectious Diseases (Ovidio Linton, A.P.R.N.)    Specimen Information: Wound   0 Result Notes      Component 2 wk ago   Significant Indicator POS Positive (POS)   Source WND   Site L Axillary Fluid   Culture Result - Abnormal    Gram Stain Result Rare WBCs.  Moderate Gram positive cocci.   Culture Result  Abnormal   Methicillin  Resistant Staphylococcus aureus  Heavy growth  This isolate is presumed to be clindamycin resistant based on  detection of inducible resistance.    Culture Result  Abnormal   Enterococcus faecalis  Heavy growth    Resulting Agency M        Susceptibility     Methicillin resistant staphylococcus aureus Enterococcus faecalis     CESIA CESIA     Ampicillin   <=2 mcg/mL Sensitive     Ampicillin/sulbactam <=8/4 mcg/mL Resistant       Cefazolin <=8 mcg/mL Resistant       Cefepime 8 mcg/mL Resistant       Clindamycin 0.5 mcg/mL Resistant       Daptomycin <=0.5 mcg/mL Sensitive 2 mcg/mL Sensitive     Erythromycin >4 mcg/mL Resistant       Gent Synergy   <=500 mcg/mL Sensitive     Linezolid 2 mcg/mL Sensitive 2 mcg/mL Sensitive     Oxacillin >2 mcg/mL Resistant       Penicillin   2 mcg/mL Sensitive     Tetracycline >8 mcg/mL Resistant >8 mcg/mL Resistant     Trimeth/Sulfa <=0.5/9.5 m... Sensitive       Vancomycin 1 mcg/mL Sensitive 1 mcg/mL Sensitive                    Narrative  Performed by: M  Left axillary fluid aspirate  Left axillary fluid aspirate      Specimen Collected: 03/07/24 12:06 PM Last Resulted: 03/10/24  8:46 AM        Lab Flowsheet        Order Details        View Encounter        Lab and Collection Details        Routing        Result History     View All Conversations on this Encounter           Result Care Coordination      Patient Communication     Add Comments   Not seen Back to Top          Contains abnormal data Anaerobic Culture  Order: [id number] - Reflex for Order [id number]   Status: Final result         Visible to patient: Yes (not seen)         Next appt: 03/29/2024 at 03:00 PM in Infectious Diseases (Ovidio Linton A.P.R.N.)      Specimen Information: Wound   0 Result Notes          Component 2 wk ago   Significant Indicator POS Positive (POS)   Source WND   Site L Axillary Fluid   Culture Result - Abnormal    Culture Result  Abnormal   Finegoldia magna  Heavy growth    Resulting Agency M               Narrative  Performed by: BENEDICT  Left axillary fluid aspirate  Left axillary fluid aspirate      Specimen Collected: 03/07/24 12:06 PM Last Resulted: 03/10/24  1:58 PM              Impression/Assessment      1. Polymicrobial bacterial infection        2. Squamous cell carcinoma of skin        3. Immunosuppressed status (HCC)  CBC WITH DIFFERENTIAL      4. Anemia of chronic disease        5. Stage 3b chronic kidney disease        6. History of renal transplant        7. Thrombocytopenia (HCC)          Jama Altman is a 68 y.o. male with hx of afib recent watchman procedure,  CAD, renal transplant 2010 (hx of polycystic kidney disease), HLD, HTN and PAD.Admitted 3/6/2024 for nonhealing wound left axilla. He was recently diagnosed with SCCA after biopsy of left axillary mass  2/23/24. CT chest + partially visualized large left axillary mass and necrotic adenopathy consistent with known SCC.  Patchy groundglass density and subsegmental atelectasis, consistent with chronic inflammatory/infectious process. Drain placed 3/7-cultures of fluid polymicrobial + MRSA (only oral option available is Zyvox), E- faecalis, Fingoldia. Repeat  CT 3/11 : Irregular fluid collection in the LEFT axillary subcutaneous soft tissues measuring 8.3 x 5.1 x 11.1 cm with gas bubbles and drainage catheter in place. IR repositioned and up sized drain on 3/14.  Patient will discharge home on 3/16 with drain in place.  He will follow-up with infectious disease outpatient for drain management and ongoing antibiotics.  Ultimately, patient may require surgical intervention for necrotic tissue that is present.  Patient was discharged home on a 14-day course of p.o. Augmentin 875/125 mg twice daily and p.o. Zyvox 600 mg twice daily.  Due to concerns of nidus of infection, unable to have I&D due to squamous cell carcinoma, and pending start date for immunotherapy, will start patient on an indefinite course of IV daptomycin 6 mg/kg ( renal dosed) once  daily until surgical plan can be established or repeat CT scan shows resolution of abscess. Discussed case with ID MD Mccoy. No other oral options available for MRSA treatment.  IV daptomycin 6 Mg/KG once daily.  Necrotic tissue with breakdown and evacuation of previous abscess.  Patient completed 4-week course of IV daptomycin which ended on 5/3/2024.    5/8/24-patient following up while off antibiotic therapy for continuous monitoring.  Left axillary site with soft pink tissue.  Serosanguineous drainage.  Continues to have necrotic tissue slough.  No signs of active infection.  Most recent labs while off antibiotic therapy from 5/7/2024 WBC 10.0, stable anemia 11.9/37.7, chronic thrombocytopenia 142 and improving, elevation in neutrophils, CMP creatinine 1.57 chronic and stable GFR 48 chronic and stable LFTs WNL.  Repeat lab work CBC plus differential in 7 days for continuous monitoring.  Education provided on signs and symptoms of reoccurrence of infection and when to report to ER/call 911.    - Due to his MRSA and history of renal cell transplant he is limited to only oral Zyvox for antibiotic treatment which can only be used for 2 weeks before causing worsening thrombocytopenia.  No good oral options for long-term suppression.  Recommend ultimately to have surgical intervention as necrotic tissue will become a nidus for infection.  EKG QTc- 453 from 3/6/24    daptomycin has been shown to have activity against Finegoldia magna  PLAN:   -  Continue to hold antibiotic therapy as no signs of active infection today.  -  Repeat lab work CBC/CMP in 1 week   -  Recommend routine follow up with oncology and wound team  -  Continue wound care to left arm.  Wound care will update ID team if signs of infection.  If signs of infection recommend culture with Gram stain to guide antibiotic therapy  -  Education provided on S/S of infection and when to report to ER/ call 911       Return visit: PRN. Follow up with primary  care physician for chronic medical problems      I have performed a physical exam,  updated ROS and plan today. I have reviewed previous images, labs, and provider notes.      BITA Gandhi.    All Patients should seek medical re-evaluation or report to the ER for new, increasing or worsening symptoms. In some circumstances medical conditions can change from the initial evaluation and may require emergent medical re-evaluation. This includes but is not limited to chest pain, shortness of breath, atypical abdominal pain, atypical headache, ALOC, fever >101, low blood pressure, high respiratory rate (above 30), low oxygen saturation (below 90%), acute delirium, abnormal bleeding, inability to tolerate any intake, weakness on one side of the body, any worsened or concerning conditions.    Please note that this dictation was created using voice recognition software. I have worked with technical experts from [facility]  [employer] to optimize the interface.  I have made every reasonable attempt to correct obvious errors, but there may be errors of grammar and possibly content that I did not discover before finalizing the note.

## 2024-05-09 ENCOUNTER — APPOINTMENT (OUTPATIENT)
Dept: INFECTIOUS DISEASES | Facility: MEDICAL CENTER | Age: 69
End: 2024-05-09
Attending: NURSE PRACTITIONER
Payer: MEDICARE

## 2024-05-10 ENCOUNTER — DOCUMENTATION (OUTPATIENT)
Dept: HEALTH INFORMATION MANAGEMENT | Facility: OTHER | Age: 69
End: 2024-05-10
Payer: MEDICARE

## 2024-05-10 ENCOUNTER — OFFICE VISIT (OUTPATIENT)
Dept: MEDICAL GROUP | Facility: PHYSICIAN GROUP | Age: 69
End: 2024-05-10
Payer: MEDICARE

## 2024-05-10 VITALS
HEART RATE: 80 BPM | RESPIRATION RATE: 16 BRPM | OXYGEN SATURATION: 94 % | WEIGHT: 233 LBS | DIASTOLIC BLOOD PRESSURE: 64 MMHG | HEIGHT: 77 IN | SYSTOLIC BLOOD PRESSURE: 108 MMHG | BODY MASS INDEX: 27.51 KG/M2 | TEMPERATURE: 98.5 F

## 2024-05-10 DIAGNOSIS — I10 PRIMARY HYPERTENSION: ICD-10-CM

## 2024-05-10 DIAGNOSIS — R05.9 COUGH IN ADULT: ICD-10-CM

## 2024-05-10 PROBLEM — Z09 HOSPITAL DISCHARGE FOLLOW-UP: Status: RESOLVED | Noted: 2024-03-20 | Resolved: 2024-05-10

## 2024-05-10 PROCEDURE — 99213 OFFICE O/P EST LOW 20 MIN: CPT | Performed by: FAMILY MEDICINE

## 2024-05-10 PROCEDURE — 3078F DIAST BP <80 MM HG: CPT | Performed by: FAMILY MEDICINE

## 2024-05-10 PROCEDURE — 3074F SYST BP LT 130 MM HG: CPT | Performed by: FAMILY MEDICINE

## 2024-05-10 RX ORDER — FLUTICASONE PROPIONATE AND SALMETEROL 250; 50 UG/1; UG/1
1 POWDER RESPIRATORY (INHALATION) EVERY 12 HOURS
Qty: 60 EACH | Refills: 3 | Status: SHIPPED | OUTPATIENT
Start: 2024-05-10

## 2024-05-10 ASSESSMENT — FIBROSIS 4 INDEX: FIB4 SCORE: 2.06

## 2024-05-10 NOTE — PROGRESS NOTES
Subjective:     CC: Here for persistent cough.    HPI:   Jama presents today with the following medical concerns:    Cough in adult  This is an ongoing problem.  Patient is having troubles with chronic cough.  He does state that since he shook his albuterol inhaler more it seems to work a little better but he is still usingit 3-4 times a day.  No previous history of COPD or asthma.  Recent chest CT did show some small bilateral pleural effusions but he also has 2 pulmonary nodules that are suspicious for metastatic disease.  He still having evaluation and workup done.    Primary hypertension  This is a chronic problem.  Patient states has been on long-term metoprolol he thinks just for rapid heartbeat and not really blood pressure.  With all his recent changes of health he states his blood pressure is tending a little lower and he is feeling fatigued in the morning.  He states he takes till about 10:00 am  till he feels more energetic.    Past Medical History:   Diagnosis Date    A-fib (HCC)     Arrhythmia     Benign essential hypertension     Diabetes (HCC)     type 2    Heart burn     Hemorrhagic disorder (HCC)     Eliquis    Hyperlipoproteinemia     Hypertension     not on meds anymore    Indigestion     Myocardial infarct (HCC) 2013    stent    Polycystic kidney 09/10/2010    RIGHT KIDNEY TRANSPLANT    Presence of Watchman left atrial appendage closure device 02/29/2024    Sleep apnea 01/30/2024    states he was told he had sleep apnea but has not had a sleep study    Snoring        Social History     Tobacco Use    Smoking status: Never     Passive exposure: Never    Smokeless tobacco: Never   Vaping Use    Vaping Use: Never used   Substance Use Topics    Alcohol use: No    Drug use: No       Current Outpatient Medications Ordered in Epic   Medication Sig Dispense Refill    fluticasone-salmeterol (ADVAIR) 250-50 MCG/ACT AEROSOL POWDER, BREATH ACTIVATED Inhale 1 Puff every 12 hours. 60 Each 3    metroNIDAZOLE  (FLAGYL) 500 MG Tab Crush 1-2 tablets and apply dirrectly into wound bed daily as needed for odor control. 30 Tablet 3    predniSONE (DELTASONE) 10 MG Tab Take 20 mg by mouth every day.      Sirolimus 2 MG Tab Taking 2 a day      acetaminophen-codeine #3 (TYLENOL #3) 300-30 MG Tab TAKE 1 TABLET BY MOUTH FOUR TIMES DAILY FOR 6 HOURS AS NEEDED FOR CANCER PAIN      albuterol 108 (90 Base) MCG/ACT Aero Soln inhalation aerosol Inhale 2 Puffs every four hours as needed for Shortness of Breath. 8 g 1    omeprazole (PRILOSEC) 20 MG delayed-release capsule Take 1 Capsule by mouth every day. Indications: Heartburn 90 Capsule 3    glyBURIDE (DIABETA) 5 MG Tab TAKE 2 TABLETS BY MOUTH TWICE DAILY WITH MEALS 400 Tablet 3    gabapentin (NEURONTIN) 400 MG Cap Take 1 Capsule by mouth 2 times a day. 90 Capsule 1    sodium bicarbonate (SODIUM BICARBONATE) 650 MG Tab Take 1 Tablet by mouth 2 (two) times a day. 120 Tablet 3    metoprolol SR (TOPROL XL) 25 MG TABLET SR 24 HR Take 1 Tablet by mouth every day. May take additional one tab daily for heart rate sustaining >110 BPM. 100 Tablet 3    atorvastatin (LIPITOR) 80 MG tablet Take 1 Tablet by mouth at bedtime. 100 Tablet 3    tadalafil (CIALIS) 5 MG tablet : TAKE 1 TABLETS 30 MINUTES BEFORE SEXUAL ACTIVITY, DO NOT EXCEED MORE THAN ONE DOSE A DAY 15 Tablet 3    aspirin 81 MG EC tablet Take 1 Tablet by mouth every day. 100 Tablet 1    pioglitazone (ACTOS) 45 MG Tab TAKE ONE TABLET BY MOUTH ONCE DAILY 100 Tablet 3    linagliptin (TRADJENTA) 5 MG Tab tablet Take 1 Tablet by mouth every day. 100 Tablet 3     No current Epic-ordered facility-administered medications on file.       Allergies:  Doxycycline    Health Maintenance: Completed    ROS:  Gen: no fevers/chills, no changes in weight  Eyes: no changes in vision  ENT: no sore throat, no hearing loss, no bloody nose  Pulm: no sob,   CV: no chest pain, no palpitations  GI: no nausea/vomiting, no diarrhea  : no dysuria  Skin: no  "rash  Neuro: no headaches, no numbness/tingling  Heme/Lymph: no easy bruising      Objective:       Exam:  /64 (BP Location: Left arm, Patient Position: Sitting, BP Cuff Size: Adult)   Pulse 80   Temp 36.9 °C (98.5 °F) (Temporal)   Resp 16   Ht 1.956 m (6' 5\")   Wt 106 kg (233 lb)   SpO2 94%   BMI 27.63 kg/m²  Body mass index is 27.63 kg/m².    Gen: Alert and oriented, No apparent distress.  Neck: Neck is supple without lymphadenopathy.  Lungs: Normal effort, CTA bilaterally, no wheezes, rhonchi, or rales  CV: Regular rate   Ext: No clubbing, cyanosis, edema.        Assessment & Plan:     68 y.o. male with the following -     1. Cough in adult  This is now chronic problem.  Will try him on Advair daily and told him to use the albuterol as a rescue inhaler.  He is to report back in 1 to 2 weeks on how that works for him.  If this not helpful then we could try Tessalon Perles.  If his lung nodules do turn out to be metastatic disease then other options would be codeine if his cough progresses.  He is already on gabapentin.      2. Primary hypertension  This is a chronic problem.  His blood pressure today is 108/64.  Since his home readings have been low as well I Will have him just stop his metoprolol and watch his blood pressure and pulse.  If they go up he is to let me know.    Return if symptoms worsen or fail to improve.    Please note that this dictation was created using voice recognition software. I have made every reasonable attempt to correct obvious errors, but I expect that there are errors of grammar and possibly content that I did not discover before finalizing the note.        "

## 2024-05-10 NOTE — ASSESSMENT & PLAN NOTE
This is an ongoing problem.  Patient is having troubles with chronic cough.  He does state that since he shook his albuterol inhaler more it seems to work a little better but he is still usingit 3-4 times a day.  No previous history of COPD or asthma.  Recent chest CT did show some small bilateral pleural effusions but he also has 2 pulmonary nodules that are suspicious for metastatic disease.  He still having evaluation and workup done.

## 2024-05-10 NOTE — ASSESSMENT & PLAN NOTE
This is a chronic problem.  Patient states has been on long-term metoprolol he thinks just for rapid heartbeat and not really blood pressure.  With all his recent changes of health he states his blood pressure is tending a little lower and he is feeling fatigued in the morning.  He states he takes till about 10:00 am  till he feels more energetic.

## 2024-05-14 ENCOUNTER — HOSPITAL ENCOUNTER (OUTPATIENT)
Dept: LAB | Facility: MEDICAL CENTER | Age: 69
End: 2024-05-14
Attending: NURSE PRACTITIONER
Payer: MEDICARE

## 2024-05-14 ENCOUNTER — OFFICE VISIT (OUTPATIENT)
Dept: WOUND CARE | Facility: MEDICAL CENTER | Age: 69
End: 2024-05-14
Attending: NURSE PRACTITIONER
Payer: MEDICARE

## 2024-05-14 VITALS
DIASTOLIC BLOOD PRESSURE: 71 MMHG | OXYGEN SATURATION: 96 % | SYSTOLIC BLOOD PRESSURE: 97 MMHG | TEMPERATURE: 97.5 F | HEART RATE: 96 BPM | RESPIRATION RATE: 18 BRPM

## 2024-05-14 DIAGNOSIS — D84.9 IMMUNOSUPPRESSED STATUS (HCC): ICD-10-CM

## 2024-05-14 DIAGNOSIS — Z94.0 HISTORY OF RENAL TRANSPLANT: ICD-10-CM

## 2024-05-14 DIAGNOSIS — I89.0 LYMPHEDEMA OF LEFT ARM: ICD-10-CM

## 2024-05-14 DIAGNOSIS — S51.802A OPEN WOUND OF LEFT FOREARM, INITIAL ENCOUNTER: ICD-10-CM

## 2024-05-14 DIAGNOSIS — L02.412 ABSCESS OF AXILLA, LEFT: ICD-10-CM

## 2024-05-14 DIAGNOSIS — C44.629 SQUAMOUS CELL CARCINOMA OF LEFT UPPER EXTREMITY: ICD-10-CM

## 2024-05-14 LAB
ANISOCYTOSIS BLD QL SMEAR: ABNORMAL
BASOPHILS # BLD AUTO: 2.1 % (ref 0–1.8)
BASOPHILS # BLD: 0.25 K/UL (ref 0–0.12)
BURR CELLS BLD QL SMEAR: NORMAL
COMMENT 1642: NORMAL
EOSINOPHIL # BLD AUTO: 0.02 K/UL (ref 0–0.51)
EOSINOPHIL NFR BLD: 0.2 % (ref 0–6.9)
ERYTHROCYTE [DISTWIDTH] IN BLOOD BY AUTOMATED COUNT: 55.2 FL (ref 35.9–50)
HCT VFR BLD AUTO: 41.7 % (ref 42–52)
HGB BLD-MCNC: 11.9 G/DL (ref 14–18)
IMM GRANULOCYTES # BLD AUTO: 0.16 K/UL (ref 0–0.11)
IMM GRANULOCYTES NFR BLD AUTO: 1.3 % (ref 0–0.9)
LYMPHOCYTES # BLD AUTO: 0.27 K/UL (ref 1–4.8)
LYMPHOCYTES NFR BLD: 2.2 % (ref 22–41)
MCH RBC QN AUTO: 27.2 PG (ref 27–33)
MCHC RBC AUTO-ENTMCNC: 28.5 G/DL (ref 32.3–36.5)
MCV RBC AUTO: 95.2 FL (ref 81.4–97.8)
MICROCYTES BLD QL SMEAR: ABNORMAL
MONOCYTES # BLD AUTO: 0.47 K/UL (ref 0–0.85)
MONOCYTES NFR BLD AUTO: 3.9 % (ref 0–13.4)
MORPHOLOGY BLD-IMP: NORMAL
NEUTROPHILS # BLD AUTO: 10.95 K/UL (ref 1.82–7.42)
NEUTROPHILS NFR BLD: 90.3 % (ref 44–72)
NRBC # BLD AUTO: 0 K/UL
NRBC BLD-RTO: 0 /100 WBC (ref 0–0.2)
PLATELET # BLD AUTO: 134 K/UL (ref 164–446)
PLATELET BLD QL SMEAR: NORMAL
PLATELETS.RETICULATED NFR BLD AUTO: 6.7 % (ref 0.6–13.1)
PMV BLD AUTO: 13.6 FL (ref 9–12.9)
POIKILOCYTOSIS BLD QL SMEAR: NORMAL
RBC # BLD AUTO: 4.38 M/UL (ref 4.7–6.1)
RBC BLD AUTO: PRESENT
TARGETS BLD QL SMEAR: NORMAL
WBC # BLD AUTO: 12.1 K/UL (ref 4.8–10.8)

## 2024-05-14 PROCEDURE — 99214 OFFICE O/P EST MOD 30 MIN: CPT | Performed by: STUDENT IN AN ORGANIZED HEALTH CARE EDUCATION/TRAINING PROGRAM

## 2024-05-14 PROCEDURE — 3074F SYST BP LT 130 MM HG: CPT | Performed by: STUDENT IN AN ORGANIZED HEALTH CARE EDUCATION/TRAINING PROGRAM

## 2024-05-14 PROCEDURE — 3078F DIAST BP <80 MM HG: CPT | Performed by: STUDENT IN AN ORGANIZED HEALTH CARE EDUCATION/TRAINING PROGRAM

## 2024-05-14 NOTE — PATIENT INSTRUCTIONS
-Keep your wound dressing clean, dry, and intact.    -Should you experience any significant changes in your wound(s), such as infection (redness, swelling, localized heat, increased pain, fever > 101 F, chills) or have any questions regarding your home care instructions, please contact the wound center at [phone]. If after hours, contact your primary care physician or go to the hospital emergency room.

## 2024-05-15 NOTE — PROGRESS NOTES
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 Patient has had heavy drainage from left axillary necrotic fungating tumor. Patient was referred to [facility] for wound care.    Pertinent Medical History: See above     TOBACCO USE:  Denies ever using    Patient's problem list, allergies, and current medications reviewed and updated in Epic    Interval History:    5/1/2024: Clinic visit with Ze Martins MD. Patient reports feeling in normal state of health. He is having some discomfort from left axillary necrotic tumor. He reprots that over the past week much of the fungating necrotic tumor has been sloughing off leaving large necrotic wound with thick slough. Patient has heavy drainage necessitating frequent dressing changes noting increased odor. His drain fell out today, is hanging by suture. Patient also endorses increased edema of left arm which is causing some discomfort.    5/14/2024: Clinic visit with Ze Martins MD. Patient reports doing ok. He denies any signs or symptoms of infection. Reports drainage has decreased and odor has improved with use of topical crushed flagyl. Will recommend he start with damp gauze to axilla to promote easier dressing changes. He has new wound left forearm with heavy serous drainage undoubtedly due to lymphedema of left arm.    REVIEW OF SYSTEMS:   Unchanged from previous wound clinic assessment on 5/1/2024, except as noted in interval history above      PHYSICAL EXAMINATION:   BP 97/71   Pulse 96   Temp 36.4 °C (97.5 °F)   Resp 18   SpO2 96%     Physical  Exam  Constitutional:       General: He is not in acute distress.     Appearance: Normal appearance.   Cardiovascular:      Rate and Rhythm: Normal rate.      Pulses: Normal pulses.   Pulmonary:      Effort: Pulmonary effort is normal. No respiratory distress.   Musculoskeletal:      Comments: Left upper extremity edema extending from fingers to axilla   Skin:     Comments: Left Axillary Necrotic Tumor: Wound cavity may be slightly larger. Minimal slough. Odor well controlled.    Left forearm open wound: Pinpoint open wound with heavy serous drainage. No evidence of infection.   Neurological:      Mental Status: He is alert.         WOUND ASSESSMENT  Wound 03/06/24 Axilla Left (Active)   Wound Image   05/14/24 1500   Site Assessment Red;Yellow 05/14/24 1500   Periwound Assessment Blanchable erythema;Induration 05/14/24 1500   Margins Unattached edges 05/14/24 1500   Closure Other (Comment) 05/01/24 1656   Drainage Amount Moderate 05/14/24 1500   Drainage Description Serous;Yellow 05/14/24 1500   Treatments Cleansed 05/14/24 1500   Wound Cleansing Normal Saline Irrigation 05/14/24 1500   Periwound Protectant No-sting Skin Prep 05/01/24 1656   Dressing Changed New 05/14/24 1500   Dressing Cleansing/Solutions Normal Saline 05/14/24 1500   Dressing Options Moist Roll Gauze;Absorbent Abdominal Pad;Hypafix Tape 05/14/24 1500   Dressing Change/Treatment Frequency Daily, and As Needed 05/14/24 1500   Wound Team Following Bi-Monthly 05/14/24 1500   Wound Length (cm) 5 cm 05/14/24 1500   Wound Width (cm) 7 cm 05/14/24 1500   Wound Depth (cm) 4.5 cm 05/14/24 1500   Wound Surface Area (cm^2) 35 cm^2 05/14/24 1500   Wound Volume (cm^3) 157.5 cm^3 05/14/24 1500   Wound Healing % 17 05/14/24 1500   Tunneling (cm) 7.5 cm 05/01/24 1656   Tunneling Clock Position of Wound 12 05/01/24 1656   Wound Odor None 05/14/24 1500   Exposed Structures JUDITH 05/14/24 1500   Number of days: 69       Wound 05/14/24 Partial Thickness Wound Arm Left  Forearm (Active)   Wound Image   05/14/24 1500   Site Assessment [place] 05/14/24 1500   Periwound Assessment Edema 05/14/24 1500   Margins Attached edges 05/14/24 1500   Drainage Amount Large 05/14/24 1500   Drainage Description Serous 05/14/24 1500   Treatments Cleansed 05/14/24 1500   Wound Cleansing Normal Saline Irrigation 05/14/24 1500   Periwound Protectant Not Applicable 05/14/24 1500   Dressing Cleansing/Solutions Not Applicable 05/14/24 1500   Dressing Change/Treatment Frequency Every 48 hrs, and As Needed 05/14/24 1500   Wound Team Following Bi-Monthly 05/14/24 1500   Non-staged Wound Description Partial thickness 05/14/24 1500   Wound Length (cm) 0.2 cm 05/14/24 1500   Wound Width (cm) 0.2 cm 05/14/24 1500   Wound Surface Area (cm^2) 0.04 cm^2 05/14/24 1500   Tunneling (cm) 0 cm 05/14/24 1500   Undermining (cm) 0 cm 05/14/24 1500   Wound Odor None 05/14/24 1500   Exposed Structures None 05/14/24 1500   Number of days: 0       PROCEDURE:   - Excisional debridement is contraindicated by active cancer diagnosis. No debridement required.      Pertinent Labs and Diagnostics:    Labs:     A1c:   Lab Results   Component Value Date/Time    HBA1C 6.9 (H) 02/16/2024 08:14 AM    HBA1C 6.9 (A) 02/16/2024 08:14 AM          IMAGING: See imaging    VASCULAR STUDIES: US LUE negative for DVT    LAST  WOUND CULTURE:  DATE :   Lab Results   Component Value Date/Time    CULTRSULT - (A) 04/23/2024 08:21 AM    CULTRSULT Klebsiella oxytoca  10-50,000 cfu/mL   (A) 04/23/2024 08:21 AM         ASSESSMENT AND PLAN:     1. Squamous cell carcinoma of left upper extremity    5/14/2024  - Left axillary SCC with necrotic fungating tumor which is necrosing and has formed large necrotic wound  - Counseled of limited options for wound healing with active cancer, do not expect wound to resolve until cancer is eradicated   - Continue palliative wound care to manage drainage and manage odor. Both have been well managed.  - Reports that he has  plenty of flagyl  - Reports that he never received tubigrips, will have nurse order from [employer] for compression.  - Continue to follow with oncology. Patient continues immunotherapy.  Wound Care: Damp to dry dressings with rolled gauze, dry gauze, Abd pad, tape, tubigrip to help improve lymphedema    2. Open wound left forearm    5/14/2024  - New wound in clinic today   - Pinpoint wound with heavy serous drainage related to uncontrolled lymphedema  - Start superabsorbant pad to help manage drainage  - Initiate compression with double layer tubigrip  - No need for debridement   Wound Care: Superabsorbant pad, tubigrips    3. Abscess of axilla, left    5/14/2024  - CT with resolution of fluid collection  - Follows with ID, being observed off Abx    4. Lymphedema of left arm    5/14/2024  - Due to location of invasive SCC in left axilla, left arm swelling appears to be consistent with lymphedema. He is have some discomfort due to the edema  - Start compression with tubigrips, has not been applying at home as did not receive in supply order  - Nurse to ensure they are ordered  - Referred to lymphedema clinic, there is delay in receiving care.    5. History of renal transplant  6. Immunosuppressed status (HCC)  - Risk factor for impaired wound healing.    PATIENT EDUCATION  - Importance of adequate nutrition for wound healing  -Advised to go to ER for any increased redness, swelling, drainage, or odor, or if patient develops fever, chills, nausea or vomiting.     My total time spent caring for the patient on the day of the encounter was 20 minutes, reviewing history, assessment, counseling and education, and coordination of care.  This does not include time spent on separately billable procedures/tests.      Please note that this note may have been created using voice recognition software. I have worked with technical experts from [employer] to optimize the interface.  I have made every reasonable attempt to  correct obvious errors, but there may be errors of grammar and possibly content that I did not discover before finalizing the note.    N

## 2024-05-15 NOTE — PROGRESS NOTES
Advanced [facility]   [facility]   [address]   [address]   [place] [address]   [phone] Fax: [phone]    5/15/24 0905: Order placed via [employer] for wound supplies for left forearm wound (convamax super absorbent pads, hypafix tape, tensogrip size F). Patient will be up for a refill for supplies already ordered for left axilla wound around 6/1/24.     Duration of Supply Order: 60 Days  Dispense as written.       Wound 03/06/24 Axilla Left (Active)   Wound Image   05/14/24 1500   Site Assessment Red;Yellow 05/14/24 1500   Periwound Assessment Blanchable erythema;Induration 05/14/24 1500   Margins Unattached edges 05/14/24 1500   Closure Other (Comment) 05/01/24 1656   Drainage Amount Moderate 05/14/24 1500   Drainage Description Serous;Yellow 05/14/24 1500   Treatments Cleansed 05/14/24 1500   Wound Cleansing Normal Saline Irrigation 05/14/24 1500   Periwound Protectant No-sting Skin Prep 05/01/24 1656   Dressing Changed New 05/14/24 1500   Dressing Cleansing/Solutions Normal Saline 05/14/24 1500   Dressing Options Moist Roll Gauze;Absorbent Abdominal Pad;Hypafix Tape 05/14/24 1500   Dressing Change/Treatment Frequency Daily, and As Needed 05/14/24 1500   Wound Team Following Bi-Monthly 05/14/24 1500   Wound Length (cm) 5 cm 05/14/24 1500   Wound Width (cm) 7 cm 05/14/24 1500   Wound Depth (cm) 4.5 cm 05/14/24 1500   Wound Surface Area (cm^2) 35 cm^2 05/14/24 1500   Wound Volume (cm^3) 157.5 cm^3 05/14/24 1500   Wound Healing % 17 05/14/24 1500   Tunneling (cm) 7.5 cm 05/01/24 1656   Tunneling Clock Position of Wound 12 05/01/24 1656   Wound Odor None 05/14/24 1500   Exposed Structures JUDITH 05/14/24 1500       Wound 05/14/24 Partial Thickness Wound Arm Left Forearm (Active)   Wound Image   05/14/24 1500   Site Assessment [place] 05/14/24 1500   Periwound Assessment Edema 05/14/24 1500   Margins Attached edges 05/14/24 1500   Drainage Amount Large 05/14/24 1500    Drainage Description Serous 05/14/24 1500   Treatments Cleansed, Nonselective debridement  05/14/24 1500   Wound Cleansing Normal Saline Irrigation 05/14/24 1500   Periwound Protectant Not Applicable 05/14/24 1500   Dressing Cleansing/Solutions Not Applicable 05/14/24 1500   Dressing Options Super Absorbent Pad;Hypafix Tape;Dry Roll Gauze;Tensogrip size F x2 layers 05/14/24 1500   Dressing Change/Treatment Frequency Every 48 hrs, and As Needed 05/14/24 1500   Wound Team Following Bi-Monthly 05/14/24 1500   Non-staged Wound Description Full thickness 05/14/24 1500   Wound Length (cm) 0.2 cm 05/14/24 1500   Wound Width (cm) 0.2 cm 05/14/24 1500   Wound Depth (cm) 0.1 cm 05/14/24 1500   Wound Surface Area (cm^2) 0.04 cm^2 05/14/24 1500   Wound Volume (cm^3) 0.004 cm^3 05/14/24 1500   Tunneling (cm) 0 cm 05/14/24 1500   Undermining (cm) 0 cm 05/14/24 1500   Wound Odor None 05/14/24 1500   Exposed Structures None 05/14/24 1500

## 2024-05-17 DIAGNOSIS — R19.7 DIARRHEA, UNSPECIFIED TYPE: ICD-10-CM

## 2024-05-17 RX ORDER — DIPHENOXYLATE HYDROCHLORIDE AND ATROPINE SULFATE 2.5; .025 MG/1; MG/1
1 TABLET ORAL 4 TIMES DAILY PRN
Qty: 30 TABLET | Refills: 0 | Status: SHIPPED | OUTPATIENT
Start: 2024-05-17 | End: 2024-05-25

## 2024-05-22 ENCOUNTER — PHYSICAL THERAPY (OUTPATIENT)
Dept: PHYSICAL THERAPY | Facility: REHABILITATION | Age: 69
End: 2024-05-22
Attending: STUDENT IN AN ORGANIZED HEALTH CARE EDUCATION/TRAINING PROGRAM
Payer: MEDICARE

## 2024-05-22 DIAGNOSIS — I89.0 LYMPHEDEMA OF LEFT ARM: ICD-10-CM

## 2024-05-22 DIAGNOSIS — R53.0 NEOPLASTIC (MALIGNANT) RELATED FATIGUE: ICD-10-CM

## 2024-05-22 DIAGNOSIS — L90.5 SCAR CONDITIONS AND FIBROSIS OF SKIN: ICD-10-CM

## 2024-05-22 DIAGNOSIS — R29.3 ABNORMAL POSTURE: ICD-10-CM

## 2024-05-22 NOTE — OP THERAPY EVALUATION
"  Outpatient Physical Therapy  LYMPHEDEMA THERAPY INITIAL EVALUATION    [facility]  [address]  [address]  [place] [address]  Phone:  [phone]  Fax:  [phone]    Date of Evaluation: 05/22/2024    Patient: Jama Altman  YOB: 1955  MRN: [id number]     Referring Provider: Ze Martins M.D.  [address]  [address]  [place] [address]   Referring Diagnosis Squamous cell carcinoma of skin of left upper limb, including shoulder [C44.629];Lymphedema, not elsewhere classified [I89.0]     Time Calculation    Start time: 1116  Stop time: 1204 Time Calculation (min): 48 minutes             Chief Complaint: Shoulder Problem, Fatigue, and Lymphedema Aquired    Visit Diagnoses     ICD-10-CM   1. Lymphedema of left arm  I89.0   2. Neoplastic (malignant) related fatigue  R53.0   3. Abnormal posture  R29.3   4. 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 Patient has had heavy drainage from left axillary necrotic fungating tumor. Patient was referred to [facility] for wound care.\"    Pt reports his arm started swelling in March of this year. Arm does reduce at night, but hand seems to remain full. Feels as though he has nerve damage at his L chest as well as hear medial L chest. Appetite is poor but is drinking water regularly. 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 Taking immunotherapy but could not recall the name (possibly Libtayo?).       Past Medical History:   Diagnosis Date    A-fib (HCC)     Arrhythmia     Benign essential hypertension     Diabetes (HCC)     type 2    Heart burn     Hemorrhagic disorder (HCC)     Eliquis    " Hyperlipoproteinemia     Hypertension     not on meds anymore    Indigestion     Myocardial infarct (HCC) 2013    stent    Polycystic kidney 09/10/2010    RIGHT KIDNEY TRANSPLANT    Presence of Watchman left atrial appendage closure device 02/29/2024    Sleep apnea 01/30/2024    states he was told he had sleep apnea but has not had a sleep study    Snoring      Past Surgical History:   Procedure Laterality Date    STENT PLACEMENT  2013    cardiac    KNEE MANIPULATION  02/16/2012    Performed by LATOYA CONNER at SURGERY [facility] ORS    KNEE UNICOMPARTMENTAL  12/23/2011    Performed by LATOYA CONNER at SURGERY [facility] ORS    KNEE ARTHROSCOPY  12/23/2011    Performed by LATOYA CONNER at SURGERY [facility] ORS    KNEE ARTHROSCOPY  05/03/2011    Performed by HANANE GOLDMAN at SURGERY SAME DAY [facility] ORS    MENISCECTOMY, KNEE, MEDIAL  05/03/2011    Performed by HANANE GOLDMAN at SURGERY SAME DAY [facility] ORS    OTHER  09/10/2010    RIGHT KIDNEY TRANSPLANT    KNEE ARTHROPLASTY TOTAL  01/12/2007    RIGHT    KNEE ARTHROSCOPY  04/10/2006    RIGHT    OTHER ORTHOPEDIC SURGERY  07/08/1974    LEFT KNEE DEBRIDEMENT     Social History     Tobacco Use    Smoking status: Never     Passive exposure: Never    Smokeless tobacco: Never   Substance Use Topics    Alcohol use: No     Family and Occupational History     Socioeconomic History    Marital status: Single     Spouse name: Not on file    Number of children: Not on file    Years of education: Not on file    Highest education level: Not on file   Occupational History    Not on file       Lymphedema Objective      Left Upper Extremity Circumferential Measurements  Other: cm  Areola: cm  Breast Crease: cm  Axilla: 37.5 cm  Upper Proximal Humerus: 35.4 cm  Distal Humerus: 37 cm  Elbow: 35.7 cm  Mid-Forearm: 28.4 cm  Wrist: 21.5 cm  Distal Oseguera Crease: 26 cm  Total: 221.5 cm    Right Upper Extremity Circumferential Measurements  Other: cm  Areola: cm  Breast Crease:  cm  Axilla: 32.6 cm  Upper Proximal Humerus: 28.4 cm  Distal Humerus: 25.4 cm  Elbow: 27.7 cm  Mid-Forearm: 22.3 cm  Wrist: 19.2 cm  Distal Oseguera Crease: 22.4 cm  Total: cm 178            Therapeutic Exercises (CPT 31983):       Therapeutic Exercise Summary: Wall walk with     Therapeutic Treatments and Modalities:     Therapeutic Treatment and Modalities Summary: Provided education regarding Complete Decongestive Therapy (CDT). CDT is a noninvasive, multi-component approach to treat lymphedema. As there is no cure for lymphedema, the goal of treatment is to return the lymphedema to a stage of latency utilizing healthy lymph vessels and other lymphatic pathways. CDT consists of a combination of manual lymphatic drainage, compression therapy, decongestive exercises, and skin care. This will assist in maintaining the normal or near-normal size of the limb and prevent re-accumulation of lymph fluid. Additional goals of CDT include prevention and elimination of infections and reduction and removal of fibrotic tissues. Handout has been provided with this information.      Educated pt on application of multi-layer compression bandages to L UE utilizing the technique of 50% overlap and 50% stretch. Gauze has been applied to fingers to facilitate reduction in swelling to L fingers. Stockinette has been applied as a protective base layer with padding overlay to further protect skin from short-stretch bandages. Short stretch bandages in the size of 6cm, 8cm, 10cm x2 were utilized to complete compression to all aspects of affected limb. The high working pressure and low resting pressure qualities of short-stretch bandages make them the preferred compression bandage in the management of lymphedema, especially during the decongestive phase (phase I) of Complete Decongestive Therapy (CDT). Utilization of theses bandages prevents the re-accumulation of evacuated lymph fluid and conserves the results achieved during  MLD.  The bandages further serve to break up and soften deposits of of connective tissue and scar tissue that have formed prior to intervention.      Time-based treatments/modalities:    Physical Therapy Timed Treatment Charges  Functional training, self care minutes (CPT 13329): 16 minutes      Assessment and Plan:   Functional Impairments: lacks appropriate home exercise program and swelling    Assessment details:  Faraz is a 69yo M who is being treated for L axillary squamous cell carcinoma that has metastasized to his lung and spine (receiving radiation to T5). Axillary cancer has created a large wound bed that is current being treated at the wound center. Certainly, his L axillary lymph nodes have been affected and he has noted swelling to his L UE for the past three months. Patient has acquired secondary lymphedema stage 2 as a result of active cancer.  Lymphedema is characterized by high protein edema in the interstitial tissues causing damage to the lymph transport system and resulting in decreased transport capacity of the lymphatic system. Skilled lymphedema treatment is unable to be carried out by the patient and unskilled caregivers. Services will be provided by a certified lymphedema therapist.  Complete decongestive therapy is considered the gold standard of medical practice for lymphedema treatment and has proven to be effective for reduction in limb volume size and decrease risk of complications secondary to swelling (such as wounds and infections). Discussed with pt that there is a possibility that arm may not reduce given the nature of his medical condition, however, the goal will be to achieve normal or near-normal sizing of limb.    Patient to be seen until decongestion occurs. Physical therapy interventions to include manual lymphatic drainage, compression bandaging, skin care education, wound care if applicable, therapeutic exercises, custom garment fitting and lymphedema education to improve  skin integrity, decrease lymphedema swelling, improve joint arthrokinematics and range of motion and improve quality of life.    Spontaneous recovery is not expected without skilled completed decongestive lymphedema therapy.    Faraz has also mentioned fatigue regarding his immunotherapy which is expected. During his treatment here, he will also benefit from intervention to improve activity tolerance and overall cardiovascular fitness.    The patient was informed of evaluation findings and intervention plan and agrees to participate in the plan as outlined.   Prognosis: fair      Goals:   Short Term Goals:  1. Pt will achieve a total limb circumference reduction of 10cm in order to decrease risk for infection and progression of disease process.  2. Pt will be independent with self-MLD to facilitate fluid migration to healthy tissues and lymph nodes.   3. Pt will consistently perform exercises with bandages on to facilitate muscle pump of fluid from affected extremity.  4. Pt will improve his L shoulder ROM during flexion and abduction by 10deg to allow him to reach overhead.      Short term goal time span:  2-4 weeks    Long Term Goals:  1. Pt will have a reduction in total limb circumference of 20cm in order to decrease risk for infection and progression of disease process.   2. Patient will be independent with maintenance phase management of lymphedema including: compression garments, skin care education, risk reduction strategies, manual lymphatic drainage, and therapeutic exercise.   3. Pt will rate his fatigue between 1-2/10 with daily activities to improve his activity tolerance.  4. Patient will perceive the heaviness of the limb to be less as per the Lymphedema Life Impact Scale (LLIS) presented at evaluation.   5. Patient will improve the ability to move the swollen limb such that it feels less limited as per the LLIS presented at evaluation.   Long term goal time span:  4-6 weeks    Plan:  Therapy options:   Physical therapy treatment to continue  Planned therapy interventions:  Compression bandaging, caregiver education, compression sleeve, decongestive exercises, gait training (CPT 57748), home exercise program, intermittent compression, manual lymph drainage, myofascial release techniques, orthotic measurements/fitting, patient education, postural exercises, range of motion exercises, referral for compression garment & instructions for don/doffing, Efrain sleeve night garment, self-care/training (CPT 66793), sequential compression pump, short stretch bandages, skin/wound care, soft tissue manual techniques (CPT 68548), strengthening exercises and Velcro wraps  Planned education:  Functional anatomy and physiology of the lymphatic system, pathophysiology of lymphedema, lymphedema exercise, lymphedema precautions, proper skin care/nutrition, compression bandaging, self massage, infection prevention, scar tissue management, activity guidelines, dietary guidelines, skin care guidelines, home pump use, bandage removal and long term self-management of lymphedema  Frequency:  1x week  Duration in weeks:  8  Discussed with:  Patient      Functional Assessment Used    LLIS    Referring provider co-signature:  I have reviewed this plan of care and my co-signature certifies the need for services.    Certification Period: 05/22/2024 to  07/17/24    Physician Signature: ________________________________ Date: ______________

## 2024-05-23 ENCOUNTER — OFFICE VISIT (OUTPATIENT)
Dept: MEDICAL GROUP | Facility: PHYSICIAN GROUP | Age: 69
End: 2024-05-23
Payer: MEDICARE

## 2024-05-23 VITALS
RESPIRATION RATE: 16 BRPM | DIASTOLIC BLOOD PRESSURE: 70 MMHG | BODY MASS INDEX: 27.39 KG/M2 | HEIGHT: 77 IN | SYSTOLIC BLOOD PRESSURE: 126 MMHG | OXYGEN SATURATION: 93 % | TEMPERATURE: 97.9 F | WEIGHT: 232 LBS | HEART RATE: 62 BPM

## 2024-05-23 DIAGNOSIS — N18.30 TYPE 2 DIABETES MELLITUS WITH STAGE 3 CHRONIC KIDNEY DISEASE, WITHOUT LONG-TERM CURRENT USE OF INSULIN, UNSPECIFIED WHETHER STAGE 3A OR 3B CKD (HCC): ICD-10-CM

## 2024-05-23 DIAGNOSIS — R05.9 COUGH IN ADULT: ICD-10-CM

## 2024-05-23 DIAGNOSIS — E11.22 TYPE 2 DIABETES MELLITUS WITH STAGE 3 CHRONIC KIDNEY DISEASE, WITHOUT LONG-TERM CURRENT USE OF INSULIN, UNSPECIFIED WHETHER STAGE 3A OR 3B CKD (HCC): ICD-10-CM

## 2024-05-23 LAB
HBA1C MFR BLD: 11.2 % (ref ?–5.8)
POCT INT CON NEG: NEGATIVE
POCT INT CON POS: POSITIVE

## 2024-05-23 PROCEDURE — 3074F SYST BP LT 130 MM HG: CPT | Performed by: FAMILY MEDICINE

## 2024-05-23 PROCEDURE — 83036 HEMOGLOBIN GLYCOSYLATED A1C: CPT | Performed by: FAMILY MEDICINE

## 2024-05-23 PROCEDURE — 3078F DIAST BP <80 MM HG: CPT | Performed by: FAMILY MEDICINE

## 2024-05-23 PROCEDURE — 99214 OFFICE O/P EST MOD 30 MIN: CPT | Performed by: FAMILY MEDICINE

## 2024-05-23 RX ORDER — ACARBOSE 50 MG/1
50 TABLET ORAL
Qty: 90 TABLET | Refills: 2 | Status: SHIPPED | OUTPATIENT
Start: 2024-05-23 | End: 2024-05-24

## 2024-05-23 RX ORDER — AZITHROMYCIN 250 MG/1
TABLET, FILM COATED ORAL
Qty: 6 TABLET | Refills: 0 | Status: SHIPPED | OUTPATIENT
Start: 2024-05-23

## 2024-05-23 RX ORDER — BENZONATATE 200 MG/1
200 CAPSULE ORAL 3 TIMES DAILY PRN
Qty: 30 CAPSULE | Refills: 1 | Status: SHIPPED | OUTPATIENT
Start: 2024-05-23

## 2024-05-23 ASSESSMENT — FIBROSIS 4 INDEX: FIB4 SCORE: 2.182820625326996762

## 2024-05-23 NOTE — ASSESSMENT & PLAN NOTE
This is an ongoing problem.  He continues to have troubles with a cough.  He states the phlegm is generally quite but now he is turning a little yellowish color to it.  He would like to try an antibiotic to see if that would help.  He does have a possible lung lesion that could be secondary to his cancer.  They are still evaluating that.

## 2024-05-23 NOTE — ASSESSMENT & PLAN NOTE
This is a chronic problem.  Patient has had elevation of his glucose during checks by his oncologist.  Has been up in the 400 range.  He is on a higher dose of prednisone now 20 mg a day in conjunction with his immunotherapy.  He also was in the middle of radiation treatment for a lesion on his thoracic spine.  He is on 3 different oral agents at the present time.  He does not want to go to insulin.  He wants to try adding another pill.

## 2024-05-23 NOTE — PROGRESS NOTES
Subjective:     CC: Here for couple of issues.    HPI:   Jama presents today with the following medical concerns:    Type 2 diabetes mellitus (HCC)  This is a chronic problem.  Patient has had elevation of his glucose during checks by his oncologist.  Has been up in the 400 range.  He is on a higher dose of prednisone now 20 mg a day in conjunction with his immunotherapy.  He also was in the middle of radiation treatment for a lesion on his thoracic spine.  He is on 3 different oral agents at the present time.  He does not want to go to insulin.  He wants to try adding another pill.    Cough in adult  This is an ongoing problem.  He continues to have troubles with a cough.  He states the phlegm is generally quite but now he is turning a little yellowish color to it.  He would like to try an antibiotic to see if that would help.  He does have a possible lung lesion that could be secondary to his cancer.  They are still evaluating that.    Past Medical History:   Diagnosis Date    A-fib (HCC)     Arrhythmia     Benign essential hypertension     Diabetes (HCC)     type 2    Heart burn     Hemorrhagic disorder (HCC)     Eliquis    Hyperlipoproteinemia     Hypertension     not on meds anymore    Indigestion     Myocardial infarct (HCC) 2013    stent    Polycystic kidney 09/10/2010    RIGHT KIDNEY TRANSPLANT    Presence of [facility]man left atrial appendage closure device 02/29/2024    Sleep apnea 01/30/2024    states he was told he had sleep apnea but has not had a sleep study    Snoring        Social History     Tobacco Use    Smoking status: Never     Passive exposure: Never    Smokeless tobacco: Never   Vaping Use    Vaping status: Never Used   Substance Use Topics    Alcohol use: No    Drug use: No       Current Outpatient Medications Ordered in Epic   Medication Sig Dispense Refill    azithromycin (ZITHROMAX) 250 MG Tab Take 2 po today and then 1 a day 6 Tablet 0    benzonatate (TESSALON) 200 MG capsule Take 1 Capsule  by mouth 3 times a day as needed for Cough. 30 Capsule 1    acarbose (PRECOSE) 50 MG Tab Take 1 Tablet by mouth 3 times a day with meals. 90 Tablet 2    diphenoxylate-atropine (LOMOTIL) 2.5-0.025 MG Tab Take 1 Tablet by mouth 4 times a day as needed for Diarrhea for up to 8 days. 30 Tablet 0    fluticasone-salmeterol (ADVAIR) 250-50 MCG/ACT AEROSOL POWDER, BREATH ACTIVATED Inhale 1 Puff every 12 hours. 60 Each 3    metroNIDAZOLE (FLAGYL) 500 MG Tab Crush 1-2 tablets and apply dirrectly into wound bed daily as needed for odor control. 30 Tablet 3    predniSONE (DELTASONE) 10 MG Tab Take 20 mg by mouth every day.      Sirolimus 2 MG Tab Taking 2 a day      acetaminophen-codeine #3 (TYLENOL #3) 300-30 MG Tab TAKE 1 TABLET BY MOUTH FOUR TIMES DAILY FOR 6 HOURS AS NEEDED FOR CANCER PAIN      albuterol 108 (90 Base) MCG/ACT Aero Soln inhalation aerosol Inhale 2 Puffs every four hours as needed for Shortness of Breath. 8 g 1    omeprazole (PRILOSEC) 20 MG delayed-release capsule Take 1 Capsule by mouth every day. Indications: Heartburn 90 Capsule 3    glyBURIDE (DIABETA) 5 MG Tab TAKE 2 TABLETS BY MOUTH TWICE DAILY WITH MEALS 400 Tablet 3    gabapentin (NEURONTIN) 400 MG Cap Take 1 Capsule by mouth 2 times a day. 90 Capsule 1    sodium bicarbonate (SODIUM BICARBONATE) 650 MG Tab Take 1 Tablet by mouth 2 (two) times a day. 120 Tablet 3    metoprolol SR (TOPROL XL) 25 MG TABLET SR 24 HR Take 1 Tablet by mouth every day. May take additional one tab daily for heart rate sustaining >110 BPM. 100 Tablet 3    atorvastatin (LIPITOR) 80 MG tablet Take 1 Tablet by mouth at bedtime. 100 Tablet 3    tadalafil (CIALIS) 5 MG tablet : TAKE 1 TABLETS 30 MINUTES BEFORE SEXUAL ACTIVITY, DO NOT EXCEED MORE THAN ONE DOSE A DAY 15 Tablet 3    aspirin 81 MG EC tablet Take 1 Tablet by mouth every day. 100 Tablet 1    pioglitazone (ACTOS) 45 MG Tab TAKE ONE TABLET BY MOUTH ONCE DAILY 100 Tablet 3    linagliptin (TRADJENTA) 5 MG Tab tablet Take 1  "Tablet by mouth every day. 100 Tablet 3     No current Epic-ordered facility-administered medications on file.       Allergies:  Doxycycline    Health Maintenance: Completed    ROS:  Gen: no fevers/chills, no changes in weight  Eyes: no changes in vision  ENT: no sore throat, no hearing loss, no bloody nose  Pulm: no sob,   CV: no chest pain, no palpitations  GI: no nausea/vomiting, no diarrhea  : no dysuria  MSk: no myalgias  Skin: no rash  Neuro: no headaches, no numbness/tingling  Heme/Lymph: no easy bruising      Objective:       Exam:  /70 (BP Location: Right arm, Patient Position: Sitting, BP Cuff Size: Adult)   Pulse 62   Temp 36.6 °C (97.9 °F) (Temporal)   Resp 16   Ht 1.956 m (6' 5\")   Wt 105 kg (232 lb)   SpO2 93%   BMI 27.51 kg/m²  Body mass index is 27.51 kg/m².    Gen: Alert and oriented, No apparent distress.  ENT:    Ear canals and TMs are clear.  Nose appears clear.  Lungs: Normal effort, CTA bilaterally, no wheezes, rhonchi, or rales  CV: Regular rate and rhythm. No murmurs, rubs, or gallops.  Ext: No clubbing, cyanosis, edema.      Labs: Hemoglobin A1c is elevated at 11.2.  Previous labs done through oncology clinic reviewed.    Assessment & Plan:     68 y.o. male with the following -     1. Type 2 diabetes mellitus with stage 3 chronic kidney disease, without long-term current use of insulin, unspecified whether stage 3a or 3b CKD (HCC)  This is a chronic problem.  His exacerbation of his hyperglycemia is most likely due to his steroid treatment along with all the other treatments he is receiving.  He does not want to go on insulin although that would be the best choice at this point.  Will refer him to the pharmacy diabetic clinic and will add on acarbose 50 mg 3 times daily.  His next hemoglobin A1c will be due in 3 months.  Will follow his blood sugars through the oncology clinic.  - POCT Hemoglobin A1C  - Referral to Pharmacotherapy Service    2. Cough in adult  This is an " ongoing problem.  Otherwise suspect this most likely related to his other diseases is gone through we will try treating him with Zithromax in case this could be a mycoplasma infection.  Also given Tessalon Perles.      Return in about 3 months (around 8/23/2024) for Long.    Please note that this dictation was created using voice recognition software. I have made every reasonable attempt to correct obvious errors, but I expect that there are errors of grammar and possibly content that I did not discover before finalizing the note.

## 2024-05-24 RX ORDER — ACARBOSE 50 MG/1
50 TABLET ORAL
Qty: 270 TABLET | Refills: 3 | Status: SHIPPED | OUTPATIENT
Start: 2024-05-24

## 2024-05-24 RX ORDER — LINAGLIPTIN 5 MG/1
5 TABLET, FILM COATED ORAL DAILY
Qty: 100 TABLET | Refills: 3 | Status: SHIPPED | OUTPATIENT
Start: 2024-05-24

## 2024-05-24 NOTE — TELEPHONE ENCOUNTER
Received request via: Pharmacy    Was the patient seen in the last year in this department? Yes    Does the patient have an active prescription (recently filled or refills available) for medication(s) requested? No    Pharmacy Name: [id number] - [place], [place] - [address] AT [facility] [address]     Does the patient have senior living Plus and need 100 day supply (blood pressure, diabetes and cholesterol meds only)? Yes, quantity updated to 100 days

## 2024-05-28 ENCOUNTER — APPOINTMENT (OUTPATIENT)
Dept: RADIOLOGY | Facility: MEDICAL CENTER | Age: 69
DRG: 698 | End: 2024-05-28
Attending: EMERGENCY MEDICINE
Payer: MEDICARE

## 2024-05-28 ENCOUNTER — OFFICE VISIT (OUTPATIENT)
Dept: WOUND CARE | Facility: MEDICAL CENTER | Age: 69
End: 2024-05-28
Attending: NURSE PRACTITIONER
Payer: MEDICARE

## 2024-05-28 ENCOUNTER — HOSPITAL ENCOUNTER (OUTPATIENT)
Dept: LAB | Facility: MEDICAL CENTER | Age: 69
End: 2024-05-28
Attending: INTERNAL MEDICINE
Payer: MEDICARE

## 2024-05-28 ENCOUNTER — HOSPITAL ENCOUNTER (INPATIENT)
Facility: MEDICAL CENTER | Age: 69
LOS: 3 days | End: 2024-05-31
Attending: EMERGENCY MEDICINE | Admitting: HOSPITALIST
Payer: MEDICARE

## 2024-05-28 VITALS
SYSTOLIC BLOOD PRESSURE: 118 MMHG | TEMPERATURE: 97.5 F | HEART RATE: 100 BPM | OXYGEN SATURATION: 88 % | RESPIRATION RATE: 18 BRPM | DIASTOLIC BLOOD PRESSURE: 70 MMHG

## 2024-05-28 DIAGNOSIS — N17.9 AKI (ACUTE KIDNEY INJURY) (HCC): ICD-10-CM

## 2024-05-28 DIAGNOSIS — E11.69 TYPE 2 DIABETES MELLITUS WITH OTHER SPECIFIED COMPLICATION, UNSPECIFIED WHETHER LONG TERM INSULIN USE (HCC): ICD-10-CM

## 2024-05-28 DIAGNOSIS — I89.0 LYMPHEDEMA OF LEFT ARM: ICD-10-CM

## 2024-05-28 DIAGNOSIS — D84.9 IMMUNOSUPPRESSED STATUS (HCC): ICD-10-CM

## 2024-05-28 DIAGNOSIS — R09.02 HYPOXIC: ICD-10-CM

## 2024-05-28 DIAGNOSIS — Z94.0 HISTORY OF RENAL TRANSPLANT: ICD-10-CM

## 2024-05-28 DIAGNOSIS — L02.412 ABSCESS OF AXILLA, LEFT: ICD-10-CM

## 2024-05-28 DIAGNOSIS — E87.70 HYPERVOLEMIA, UNSPECIFIED HYPERVOLEMIA TYPE: ICD-10-CM

## 2024-05-28 DIAGNOSIS — J81.0 ACUTE PULMONARY EDEMA (HCC): ICD-10-CM

## 2024-05-28 DIAGNOSIS — S51.802A OPEN WOUND OF LEFT FOREARM, INITIAL ENCOUNTER: ICD-10-CM

## 2024-05-28 DIAGNOSIS — C44.629 SQUAMOUS CELL CARCINOMA OF LEFT UPPER EXTREMITY: ICD-10-CM

## 2024-05-28 PROBLEM — N18.31 ACUTE RENAL FAILURE SUPERIMPOSED ON STAGE 3A CHRONIC KIDNEY DISEASE, UNSPECIFIED ACUTE RENAL FAILURE TYPE (HCC): Status: ACTIVE | Noted: 2024-05-28

## 2024-05-28 LAB
ALBUMIN SERPL BCP-MCNC: 3.5 G/DL (ref 3.2–4.9)
ALBUMIN/GLOB SERPL: 1.3 G/DL
ALP SERPL-CCNC: 93 U/L (ref 30–99)
ALT SERPL-CCNC: 22 U/L (ref 2–50)
ANION GAP SERPL CALC-SCNC: 17 MMOL/L (ref 7–16)
ANISOCYTOSIS BLD QL SMEAR: ABNORMAL
APPEARANCE UR: CLEAR
AST SERPL-CCNC: 15 U/L (ref 12–45)
BASOPHILS # BLD AUTO: 0 % (ref 0–1.8)
BASOPHILS # BLD: 0 K/UL (ref 0–0.12)
BILIRUB SERPL-MCNC: 0.3 MG/DL (ref 0.1–1.5)
BILIRUB UR QL STRIP.AUTO: NEGATIVE
BUN SERPL-MCNC: 49 MG/DL (ref 8–22)
BURR CELLS BLD QL SMEAR: NORMAL
CALCIUM ALBUM COR SERPL-MCNC: 9.5 MG/DL (ref 8.5–10.5)
CALCIUM SERPL-MCNC: 9.1 MG/DL (ref 8.5–10.5)
CHLORIDE SERPL-SCNC: 101 MMOL/L (ref 96–112)
CHLORIDE UR-SCNC: 26 MMOL/L
CO2 SERPL-SCNC: 17 MMOL/L (ref 20–33)
COLOR UR: YELLOW
CORTIS SERPL-MCNC: 3.9 UG/DL (ref 0–23)
CREAT SERPL-MCNC: 2.08 MG/DL (ref 0.5–1.4)
CREAT UR-MCNC: 65.08 MG/DL
EKG IMPRESSION: NORMAL
EOSINOPHIL # BLD AUTO: 0 K/UL (ref 0–0.51)
EOSINOPHIL NFR BLD: 0 % (ref 0–6.9)
ERYTHROCYTE [DISTWIDTH] IN BLOOD BY AUTOMATED COUNT: 49.1 FL (ref 35.9–50)
FLUAV RNA SPEC QL NAA+PROBE: NEGATIVE
FLUBV RNA SPEC QL NAA+PROBE: NEGATIVE
GFR SERPLBLD CREATININE-BSD FMLA CKD-EPI: 34 ML/MIN/1.73 M 2
GLOBULIN SER CALC-MCNC: 2.8 G/DL (ref 1.9–3.5)
GLUCOSE BLD STRIP.AUTO-MCNC: 390 MG/DL (ref 65–99)
GLUCOSE BLD STRIP.AUTO-MCNC: 471 MG/DL (ref 65–99)
GLUCOSE SERPL-MCNC: 516 MG/DL (ref 65–99)
GLUCOSE UR STRIP.AUTO-MCNC: >=1000 MG/DL
HCT VFR BLD AUTO: 40.7 % (ref 42–52)
HGB BLD-MCNC: 12.5 G/DL (ref 14–18)
KETONES UR STRIP.AUTO-MCNC: NEGATIVE MG/DL
LEUKOCYTE ESTERASE UR QL STRIP.AUTO: NEGATIVE
LYMPHOCYTES # BLD AUTO: 0.17 K/UL (ref 1–4.8)
LYMPHOCYTES NFR BLD: 1.7 % (ref 22–41)
MANUAL DIFF BLD: NORMAL
MCH RBC QN AUTO: 26.3 PG (ref 27–33)
MCHC RBC AUTO-ENTMCNC: 30.7 G/DL (ref 32.3–36.5)
MCV RBC AUTO: 85.7 FL (ref 81.4–97.8)
MICRO URNS: ABNORMAL
MICROCYTES BLD QL SMEAR: ABNORMAL
MONOCYTES # BLD AUTO: 0.26 K/UL (ref 0–0.85)
MONOCYTES NFR BLD AUTO: 2.6 % (ref 0–13.4)
MORPHOLOGY BLD-IMP: NORMAL
MYELOCYTES NFR BLD MANUAL: 1.7 %
NEUTROPHILS # BLD AUTO: 9.4 K/UL (ref 1.82–7.42)
NEUTROPHILS NFR BLD: 94 % (ref 44–72)
NITRITE UR QL STRIP.AUTO: NEGATIVE
NRBC # BLD AUTO: 0 K/UL
NRBC BLD-RTO: 0 /100 WBC (ref 0–0.2)
NT-PROBNP SERPL IA-MCNC: 8108 PG/ML (ref 0–125)
OVALOCYTES BLD QL SMEAR: NORMAL
PH UR STRIP.AUTO: 6 [PH] (ref 5–8)
PLATELET # BLD AUTO: 122 K/UL (ref 164–446)
PLATELET BLD QL SMEAR: NORMAL
PMV BLD AUTO: 11.7 FL (ref 9–12.9)
POIKILOCYTOSIS BLD QL SMEAR: NORMAL
POTASSIUM SERPL-SCNC: 5 MMOL/L (ref 3.6–5.5)
POTASSIUM UR-SCNC: 17 MMOL/L
PROCALCITONIN SERPL-MCNC: 0.22 NG/ML
PROT SERPL-MCNC: 6.3 G/DL (ref 6–8.2)
PROT UR QL STRIP: NEGATIVE MG/DL
PROT UR-MCNC: 6 MG/DL (ref 0–15)
PROT/CREAT UR: 92 MG/G (ref 15–68)
RBC # BLD AUTO: 4.75 M/UL (ref 4.7–6.1)
RBC BLD AUTO: PRESENT
RBC UR QL AUTO: NEGATIVE
RSV RNA SPEC QL NAA+PROBE: NEGATIVE
SARS-COV-2 RNA RESP QL NAA+PROBE: NOTDETECTED
SODIUM SERPL-SCNC: 135 MMOL/L (ref 135–145)
SODIUM UR-SCNC: 25 MMOL/L
SP GR UR STRIP.AUTO: <=1.005
TROPONIN T SERPL-MCNC: 82 NG/L (ref 6–19)
UROBILINOGEN UR STRIP.AUTO-MCNC: 0.2 MG/DL
WBC # BLD AUTO: 10 K/UL (ref 4.8–10.8)

## 2024-05-28 PROCEDURE — 85027 COMPLETE CBC AUTOMATED: CPT

## 2024-05-28 PROCEDURE — 3078F DIAST BP <80 MM HG: CPT | Performed by: STUDENT IN AN ORGANIZED HEALTH CARE EDUCATION/TRAINING PROGRAM

## 2024-05-28 PROCEDURE — 700111 HCHG RX REV CODE 636 W/ 250 OVERRIDE (IP): Mod: JZ | Performed by: EMERGENCY MEDICINE

## 2024-05-28 PROCEDURE — 81003 URINALYSIS AUTO W/O SCOPE: CPT

## 2024-05-28 PROCEDURE — 82962 GLUCOSE BLOOD TEST: CPT

## 2024-05-28 PROCEDURE — 0241U HCHG SARS-COV-2 COVID-19 NFCT DS RESP RNA 4 TRGT ED POC: CPT

## 2024-05-28 PROCEDURE — 770020 HCHG ROOM/CARE - TELE (206)

## 2024-05-28 PROCEDURE — 80053 COMPREHEN METABOLIC PANEL: CPT

## 2024-05-28 PROCEDURE — 99497 ADVNCD CARE PLAN 30 MIN: CPT | Performed by: HOSPITALIST

## 2024-05-28 PROCEDURE — 84145 PROCALCITONIN (PCT): CPT

## 2024-05-28 PROCEDURE — 82436 ASSAY OF URINE CHLORIDE: CPT

## 2024-05-28 PROCEDURE — 84133 ASSAY OF URINE POTASSIUM: CPT

## 2024-05-28 PROCEDURE — 96374 THER/PROPH/DIAG INJ IV PUSH: CPT

## 2024-05-28 PROCEDURE — 84156 ASSAY OF PROTEIN URINE: CPT

## 2024-05-28 PROCEDURE — 82570 ASSAY OF URINE CREATININE: CPT

## 2024-05-28 PROCEDURE — 84300 ASSAY OF URINE SODIUM: CPT

## 2024-05-28 PROCEDURE — 36415 COLL VENOUS BLD VENIPUNCTURE: CPT

## 2024-05-28 PROCEDURE — 93005 ELECTROCARDIOGRAM TRACING: CPT | Performed by: EMERGENCY MEDICINE

## 2024-05-28 PROCEDURE — 93005 ELECTROCARDIOGRAM TRACING: CPT

## 2024-05-28 PROCEDURE — 3074F SYST BP LT 130 MM HG: CPT | Performed by: STUDENT IN AN ORGANIZED HEALTH CARE EDUCATION/TRAINING PROGRAM

## 2024-05-28 PROCEDURE — 99223 1ST HOSP IP/OBS HIGH 75: CPT | Mod: 25,AI | Performed by: HOSPITALIST

## 2024-05-28 PROCEDURE — 71045 X-RAY EXAM CHEST 1 VIEW: CPT

## 2024-05-28 PROCEDURE — 85007 BL SMEAR W/DIFF WBC COUNT: CPT

## 2024-05-28 PROCEDURE — 96372 THER/PROPH/DIAG INJ SC/IM: CPT

## 2024-05-28 PROCEDURE — 700102 HCHG RX REV CODE 250 W/ 637 OVERRIDE(OP): Performed by: EMERGENCY MEDICINE

## 2024-05-28 PROCEDURE — 84484 ASSAY OF TROPONIN QUANT: CPT

## 2024-05-28 PROCEDURE — 99213 OFFICE O/P EST LOW 20 MIN: CPT | Performed by: STUDENT IN AN ORGANIZED HEALTH CARE EDUCATION/TRAINING PROGRAM

## 2024-05-28 PROCEDURE — 99285 EMERGENCY DEPT VISIT HI MDM: CPT

## 2024-05-28 PROCEDURE — 83880 ASSAY OF NATRIURETIC PEPTIDE: CPT

## 2024-05-28 RX ORDER — LABETALOL HYDROCHLORIDE 5 MG/ML
10 INJECTION, SOLUTION INTRAVENOUS EVERY 4 HOURS PRN
Status: DISCONTINUED | OUTPATIENT
Start: 2024-05-28 | End: 2024-05-31 | Stop reason: HOSPADM

## 2024-05-28 RX ORDER — GABAPENTIN 400 MG/1
400 CAPSULE ORAL 2 TIMES DAILY
Status: DISCONTINUED | OUTPATIENT
Start: 2024-05-28 | End: 2024-05-31 | Stop reason: HOSPADM

## 2024-05-28 RX ORDER — ACETAMINOPHEN 325 MG/1
650 TABLET ORAL EVERY 6 HOURS PRN
Status: DISCONTINUED | OUTPATIENT
Start: 2024-05-28 | End: 2024-05-31 | Stop reason: HOSPADM

## 2024-05-28 RX ORDER — ATORVASTATIN CALCIUM 80 MG/1
80 TABLET, FILM COATED ORAL
Status: DISCONTINUED | OUTPATIENT
Start: 2024-05-28 | End: 2024-05-31 | Stop reason: HOSPADM

## 2024-05-28 RX ORDER — PREDNISONE 20 MG/1
20 TABLET ORAL DAILY
Status: DISCONTINUED | OUTPATIENT
Start: 2024-05-29 | End: 2024-05-31 | Stop reason: HOSPADM

## 2024-05-28 RX ORDER — SIROLIMUS 0.5 MG/1
6 TABLET, FILM COATED ORAL DAILY
Status: DISCONTINUED | OUTPATIENT
Start: 2024-05-29 | End: 2024-05-31 | Stop reason: HOSPADM

## 2024-05-28 RX ORDER — METOPROLOL SUCCINATE 25 MG/1
25 TABLET, EXTENDED RELEASE ORAL DAILY
Status: DISCONTINUED | OUTPATIENT
Start: 2024-05-29 | End: 2024-05-31 | Stop reason: HOSPADM

## 2024-05-28 RX ORDER — PREDNISONE 10 MG/1
20 TABLET ORAL DAILY
COMMUNITY

## 2024-05-28 RX ORDER — DEXTROSE MONOHYDRATE 25 G/50ML
25 INJECTION, SOLUTION INTRAVENOUS
Status: DISCONTINUED | OUTPATIENT
Start: 2024-05-28 | End: 2024-05-31 | Stop reason: HOSPADM

## 2024-05-28 RX ORDER — ONDANSETRON 4 MG/1
4 TABLET, ORALLY DISINTEGRATING ORAL EVERY 4 HOURS PRN
Status: DISCONTINUED | OUTPATIENT
Start: 2024-05-28 | End: 2024-05-31 | Stop reason: HOSPADM

## 2024-05-28 RX ORDER — ASPIRIN 81 MG/1
81 TABLET ORAL DAILY
Status: DISCONTINUED | OUTPATIENT
Start: 2024-05-29 | End: 2024-05-31 | Stop reason: HOSPADM

## 2024-05-28 RX ORDER — ONDANSETRON 2 MG/ML
4 INJECTION INTRAMUSCULAR; INTRAVENOUS EVERY 4 HOURS PRN
Status: DISCONTINUED | OUTPATIENT
Start: 2024-05-28 | End: 2024-05-31 | Stop reason: HOSPADM

## 2024-05-28 RX ORDER — FUROSEMIDE 10 MG/ML
40 INJECTION INTRAMUSCULAR; INTRAVENOUS
Status: DISCONTINUED | OUTPATIENT
Start: 2024-05-28 | End: 2024-05-30

## 2024-05-28 RX ORDER — FUROSEMIDE 10 MG/ML
80 INJECTION INTRAMUSCULAR; INTRAVENOUS ONCE
Status: COMPLETED | OUTPATIENT
Start: 2024-05-28 | End: 2024-05-28

## 2024-05-28 RX ADMIN — INSULIN HUMAN 10 UNITS: 100 INJECTION, SOLUTION PARENTERAL at 21:29

## 2024-05-28 RX ADMIN — FUROSEMIDE 80 MG: 10 INJECTION INTRAMUSCULAR; INTRAVENOUS at 21:29

## 2024-05-28 ASSESSMENT — ENCOUNTER SYMPTOMS
DIZZINESS: 0
TREMORS: 0
BLOOD IN STOOL: 0
BACK PAIN: 0
WHEEZING: 0
MYALGIAS: 0
PHOTOPHOBIA: 0
CLAUDICATION: 0
BRUISES/BLEEDS EASILY: 0
COUGH: 1
FALLS: 0
DOUBLE VISION: 0
SPUTUM PRODUCTION: 0
EYE PAIN: 0
HEMOPTYSIS: 0
SINUS PAIN: 0
POLYDIPSIA: 0
ABDOMINAL PAIN: 0
CHILLS: 0
WEAKNESS: 0
NECK PAIN: 0
SORE THROAT: 0
NAUSEA: 0
DIAPHORESIS: 0
VOMITING: 0
CONSTIPATION: 0
HEARTBURN: 0
BLURRED VISION: 0
FEVER: 0
PND: 1
STRIDOR: 0
PALPITATIONS: 1
SHORTNESS OF BREATH: 1
HALLUCINATIONS: 0
DEPRESSION: 0
TINGLING: 0
HEADACHES: 0
DIARRHEA: 0
ORTHOPNEA: 1
FLANK PAIN: 0

## 2024-05-28 ASSESSMENT — FIBROSIS 4 INDEX: FIB4 SCORE: 2.182820625326996762

## 2024-05-28 ASSESSMENT — LIFESTYLE VARIABLES: SUBSTANCE_ABUSE: 0

## 2024-05-28 NOTE — PROGRESS NOTES
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 Patient has had heavy drainage from left axillary necrotic fungating tumor. Patient was referred to [facility] for wound care.    Pertinent Medical History: See above     TOBACCO USE:  Denies ever using    Patient's problem list, allergies, and current medications reviewed and updated in Epic    Interval History:    5/1/2024: Clinic visit with Ze Martins MD. Patient reports feeling in normal state of health. He is having some discomfort from left axillary necrotic tumor. He reprots that over the past week much of the fungating necrotic tumor has been sloughing off leaving large necrotic wound with thick slough. Patient has heavy drainage necessitating frequent dressing changes noting increased odor. His drain fell out today, is hanging by suture. Patient also endorses increased edema of left arm which is causing some discomfort.    5/14/2024: Clinic visit with Ze Martins MD. Patient reports doing ok. He denies any signs or symptoms of infection. Reports drainage has decreased and odor has improved with use of topical crushed flagyl. Will recommend he start with damp gauze to axilla to promote easier dressing changes. He has new wound left forearm with heavy serous drainage undoubtedly due to lymphedema of left arm.    5/28/2024: Clinic visit with Ze Martins MD. Patient denies any signs or symptoms of infection. Patient reports that he is having worsening pain left axilla, has pain medications from oncology but is trying not to take them as he does not like  how he feels on them. He is taking OTC medication. Patient with development of new nodules periwound, axilla, chest wall. They do not appear to be abscesses, but rather hard nodules. Encouraged him to show to oncology as they are concerning for possible localized spread of cancer. Patient reports some tape trauma, recommended using Spandage though he reports does not work. Encouraged him to alternate tape sites to cut down on trauma to skin.    REVIEW OF SYSTEMS:   Unchanged from previous wound clinic assessment on 5/14/2024, except as noted in interval history above      PHYSICAL EXAMINATION:   /70   Pulse 100   Temp 36.4 °C (97.5 °F) (Temporal)   Resp 18   SpO2 88%     Physical Exam  Constitutional:       General: He is not in acute distress.     Appearance: Normal appearance.   Cardiovascular:      Rate and Rhythm: Normal rate.      Pulses: Normal pulses.   Pulmonary:      Effort: Pulmonary effort is normal. No respiratory distress.   Musculoskeletal:      Comments: Left upper extremity edema extending from fingers to axilla   Skin:     Comments: Left Axillary Necrotic Tumor: Wound appears stable. Minimal slough. Odor well controlled.    Left forearm open wound: Resolved   Neurological:      Mental Status: He is alert.         WOUND ASSESSMENT  Wound 03/06/24 Full Thickness Wound Axilla Left --Left Axilla (Active)   Wound Image   05/28/24 1455   Site Assessment Pink;Yellow 05/28/24 1455   Periwound Assessment Blanchable erythema;Other (Comment) 05/28/24 1455   Margins Unattached edges 05/28/24 1455   Closure Other (Comment) 05/01/24 1656   Drainage Amount Large 05/28/24 1455   Drainage Description Serous 05/28/24 1455   Treatments Cleansed;Topical Lidocaine;Nonselective debridement 05/28/24 1455   Wound Cleansing Normal Saline Irrigation 05/28/24 1455   Periwound Protectant No-sting Skin Prep 05/28/24 1455   Dressing Changed Changed 05/28/24 1455   Dressing Cleansing/Solutions Normal Saline 05/28/24  1455   Dressing Options Moist Roll Gauze;Absorbent Abdominal Pad;Hypafix Tape 05/28/24 1455   Dressing Change/Treatment Frequency Daily, and As Needed 05/28/24 1455   Wound Team Following Bi-Monthly 05/14/24 1500   Non-staged Wound Description Full thickness 05/28/24 1455   Wound Length (cm) 6.5 cm 05/28/24 1455   Wound Width (cm) 9 cm 05/28/24 1455   Wound Depth (cm) 5.1 cm 05/28/24 1455   Wound Surface Area (cm^2) 58.5 cm^2 05/28/24 1455   Wound Volume (cm^3) 298.35 cm^3 05/28/24 1455   Wound Healing % -58 05/28/24 1455   Tunneling (cm) 7.5 cm 05/01/24 1656   Tunneling Clock Position of Wound 12 05/01/24 1656   Undermining of Wound, 1st Location From 11 o'clock;To 12 o'clock 05/28/24 1455   Wound Odor None 05/28/24 1455   Exposed Structures JUDITH 05/28/24 1455   Number of days: 83     PROCEDURE:   - Excisional debridement is contraindicated by active cancer diagnosis. No debridement required.      Pertinent Labs and Diagnostics:    Labs:     A1c:   Lab Results   Component Value Date/Time    HBA1C 11.2 (A) 05/23/2024 03:10 PM          IMAGING: See imaging    VASCULAR STUDIES: US LUE negative for DVT    LAST  WOUND CULTURE:  DATE :   Lab Results   Component Value Date/Time    CULTRSULT - (A) 04/23/2024 08:21 AM    CULTRSULT Klebsiella oxytoca  10-50,000 cfu/mL   (A) 04/23/2024 08:21 AM         ASSESSMENT AND PLAN:     1. Squamous cell carcinoma of left upper extremity    5/28/2024  - Left axillary SCC with necrotic fungating tumor which is necrosing and has formed large necrotic wound  - Wound largely unchanged. Less slough and no odor today. New hard nodules periwound, recommend oncology evaluate as concern for localized cancer spread. Do not appear to be infectious.  - Counseled of limited options for wound healing with active cancer, do not expect wound to resolve until cancer is eradicated   - Continue palliative wound care to manage drainage and manage odor. Both have been well managed.  - Reports that he has  plenty of flagyl  - Patient not tolerating Convamax. Will attempt to order ABD pads for comfort.  - Continue to follow with oncology. Patient continues immunotherapy.  Wound Care: Damp to dry dressings with rolled gauze, dry gauze, Abd pad, tape    2. Open wound left forearm    5/28/2024  - Patient reports that wound has resolved, no drainage.  - Responding well to compression  - Continue to follow up with lymphedema.    3. Abscess of axilla, left    5/28/2024  - CT with resolution of fluid collection  - Follows with ID, being observed off Abx    4. Lymphedema of left arm    5/28/2024  - Due to location of invasive SCC in left axilla, left arm swelling appears to be consistent with lymphedema. He is have some discomfort due to the edema  - Patient was referred to lymphedema clinic and currently receiving treatment. Continue to follow up with lymphedema clinic.    5. History of renal transplant  6. Immunosuppressed status (HCC)  - Risk factor for impaired wound healing.    PATIENT EDUCATION  - Importance of adequate nutrition for wound healing  -Advised to go to ER for any increased redness, swelling, drainage, or odor, or if patient develops fever, chills, nausea or vomiting.     My total time spent caring for the patient on the day of the encounter was 20 minutes, reviewing history, assessment, counseling and education, and coordination of care.  This does not include time spent on separately billable procedures/tests.      Please note that this note may have been created using voice recognition software. I have worked with technical experts from [facility] to optimize the interface.  I have made every reasonable attempt to correct obvious errors, but there may be errors of grammar and possibly content that I did not discover before finalizing the note.    N

## 2024-05-28 NOTE — PATIENT INSTRUCTIONS
-Keep dressings clean and dry. Change dressings daily, and if they become over saturated, soiled or fall off.     -If you need to change your dressings at home: Wash your wound with normal saline, wound cleanser, or unscented soap and water prior to applying your new dressings. Please avoid cleansing with hydrogen peroxide or rubbing alcohol. Hydrogen peroxide and rubbing alcohol are toxic to new tissue and skin cells.    -Avoid prolonged standing or sitting without elevating your legs.    -Remove your compression garments if you have severe pain, severe swelling, numbness, color change, or temperature change in your toes. If you need to remove your compression garments, do so by unrolling them. Do not cut the compression garments off, this is to prevent cutting yourself on accident.    -Should you experience any significant changes in your wound, such as signs of infection (increasing redness, swelling, localized heat, increased pain, fever > 101 F, chills) or have any questions regarding your home care instructions, please contact the wound center at [phone]. If after hours, contact your primary care physician or go to the hospital emergency room.     -If you are 5 or more minutes late for an appointment, we reserve the right to cancel and reschedule that appointment. Additionally, if you are habitually late or not showing (3 late cancellations and/or no shows), we reserve the right to cancel your remaining appointments and it will be your responsibility to obtain a new referral if services are still needed.

## 2024-05-29 ENCOUNTER — APPOINTMENT (OUTPATIENT)
Dept: PHYSICAL THERAPY | Facility: REHABILITATION | Age: 69
End: 2024-05-29
Attending: STUDENT IN AN ORGANIZED HEALTH CARE EDUCATION/TRAINING PROGRAM
Payer: MEDICARE

## 2024-05-29 LAB
ACTH PLAS-MCNC: 4 PG/ML (ref 7.2–63.3)
ALBUMIN SERPL BCP-MCNC: 3.2 G/DL (ref 3.2–4.9)
ALBUMIN/GLOB SERPL: 1.2 G/DL
ALP SERPL-CCNC: 63 U/L (ref 30–99)
ALT SERPL-CCNC: 18 U/L (ref 2–50)
ANION GAP SERPL CALC-SCNC: 13 MMOL/L (ref 7–16)
AST SERPL-CCNC: 14 U/L (ref 12–45)
BASOPHILS # BLD AUTO: 0.4 % (ref 0–1.8)
BASOPHILS # BLD: 0.04 K/UL (ref 0–0.12)
BILIRUB SERPL-MCNC: 0.3 MG/DL (ref 0.1–1.5)
BUN SERPL-MCNC: 49 MG/DL (ref 8–22)
CALCIUM ALBUM COR SERPL-MCNC: 9.7 MG/DL (ref 8.5–10.5)
CALCIUM SERPL-MCNC: 9.1 MG/DL (ref 8.5–10.5)
CHLORIDE SERPL-SCNC: 103 MMOL/L (ref 96–112)
CO2 SERPL-SCNC: 22 MMOL/L (ref 20–33)
CREAT SERPL-MCNC: 1.78 MG/DL (ref 0.5–1.4)
EOSINOPHIL # BLD AUTO: 0.07 K/UL (ref 0–0.51)
EOSINOPHIL NFR BLD: 0.6 % (ref 0–6.9)
ERYTHROCYTE [DISTWIDTH] IN BLOOD BY AUTOMATED COUNT: 49.1 FL (ref 35.9–50)
GFR SERPLBLD CREATININE-BSD FMLA CKD-EPI: 41 ML/MIN/1.73 M 2
GLOBULIN SER CALC-MCNC: 2.6 G/DL (ref 1.9–3.5)
GLUCOSE BLD STRIP.AUTO-MCNC: 149 MG/DL (ref 65–99)
GLUCOSE BLD STRIP.AUTO-MCNC: 279 MG/DL (ref 65–99)
GLUCOSE BLD STRIP.AUTO-MCNC: 280 MG/DL (ref 65–99)
GLUCOSE BLD STRIP.AUTO-MCNC: 368 MG/DL (ref 65–99)
GLUCOSE SERPL-MCNC: 187 MG/DL (ref 65–99)
HCT VFR BLD AUTO: 37.2 % (ref 42–52)
HGB BLD-MCNC: 11.7 G/DL (ref 14–18)
IMM GRANULOCYTES # BLD AUTO: 0.27 K/UL (ref 0–0.11)
IMM GRANULOCYTES NFR BLD AUTO: 2.4 % (ref 0–0.9)
LYMPHOCYTES # BLD AUTO: 0.15 K/UL (ref 1–4.8)
LYMPHOCYTES NFR BLD: 1.4 % (ref 22–41)
MCH RBC QN AUTO: 26.5 PG (ref 27–33)
MCHC RBC AUTO-ENTMCNC: 31.5 G/DL (ref 32.3–36.5)
MCV RBC AUTO: 84.4 FL (ref 81.4–97.8)
MONOCYTES # BLD AUTO: 0.55 K/UL (ref 0–0.85)
MONOCYTES NFR BLD AUTO: 5 % (ref 0–13.4)
NEUTROPHILS # BLD AUTO: 9.99 K/UL (ref 1.82–7.42)
NEUTROPHILS NFR BLD: 90.2 % (ref 44–72)
NRBC # BLD AUTO: 0 K/UL
NRBC BLD-RTO: 0 /100 WBC (ref 0–0.2)
PLATELET # BLD AUTO: 102 K/UL (ref 164–446)
PLATELETS.RETICULATED NFR BLD AUTO: 9.7 % (ref 0.6–13.1)
PMV BLD AUTO: 11.4 FL (ref 9–12.9)
POTASSIUM SERPL-SCNC: 4.3 MMOL/L (ref 3.6–5.5)
PROT SERPL-MCNC: 5.8 G/DL (ref 6–8.2)
RBC # BLD AUTO: 4.41 M/UL (ref 4.7–6.1)
SODIUM SERPL-SCNC: 138 MMOL/L (ref 135–145)
TROPONIN T SERPL-MCNC: 89 NG/L (ref 6–19)
WBC # BLD AUTO: 11.1 K/UL (ref 4.8–10.8)

## 2024-05-29 PROCEDURE — 85055 RETICULATED PLATELET ASSAY: CPT

## 2024-05-29 PROCEDURE — 99233 SBSQ HOSP IP/OBS HIGH 50: CPT | Performed by: STUDENT IN AN ORGANIZED HEALTH CARE EDUCATION/TRAINING PROGRAM

## 2024-05-29 PROCEDURE — 700111 HCHG RX REV CODE 636 W/ 250 OVERRIDE (IP): Performed by: HOSPITALIST

## 2024-05-29 PROCEDURE — 700102 HCHG RX REV CODE 250 W/ 637 OVERRIDE(OP): Performed by: HOSPITALIST

## 2024-05-29 PROCEDURE — 770020 HCHG ROOM/CARE - TELE (206)

## 2024-05-29 PROCEDURE — 82962 GLUCOSE BLOOD TEST: CPT | Mod: 91

## 2024-05-29 PROCEDURE — 36415 COLL VENOUS BLD VENIPUNCTURE: CPT

## 2024-05-29 PROCEDURE — 700101 HCHG RX REV CODE 250: Performed by: STUDENT IN AN ORGANIZED HEALTH CARE EDUCATION/TRAINING PROGRAM

## 2024-05-29 PROCEDURE — A9270 NON-COVERED ITEM OR SERVICE: HCPCS | Performed by: HOSPITALIST

## 2024-05-29 PROCEDURE — 85025 COMPLETE CBC W/AUTO DIFF WBC: CPT

## 2024-05-29 PROCEDURE — 80053 COMPREHEN METABOLIC PANEL: CPT

## 2024-05-29 PROCEDURE — 84484 ASSAY OF TROPONIN QUANT: CPT

## 2024-05-29 RX ORDER — SODIUM HYPOCHLORITE 1.25 MG/ML
SOLUTION TOPICAL 2 TIMES DAILY
Status: DISCONTINUED | OUTPATIENT
Start: 2024-05-29 | End: 2024-05-29

## 2024-05-29 RX ADMIN — GABAPENTIN 400 MG: 400 CAPSULE ORAL at 06:01

## 2024-05-29 RX ADMIN — ACETAMINOPHEN 650 MG: 325 TABLET, FILM COATED ORAL at 08:32

## 2024-05-29 RX ADMIN — INSULIN HUMAN 7 UNITS: 100 INJECTION, SOLUTION PARENTERAL at 21:46

## 2024-05-29 RX ADMIN — SIROLIMUS 6 MG: 0.5 TABLET ORAL at 06:03

## 2024-05-29 RX ADMIN — MOMETASONE FUROATE AND FORMOTEROL FUMARATE DIHYDRATE 1 PUFF: 200; 5 AEROSOL RESPIRATORY (INHALATION) at 06:01

## 2024-05-29 RX ADMIN — METOPROLOL SUCCINATE 25 MG: 25 TABLET, EXTENDED RELEASE ORAL at 06:02

## 2024-05-29 RX ADMIN — ACETAMINOPHEN 650 MG: 325 TABLET, FILM COATED ORAL at 01:43

## 2024-05-29 RX ADMIN — PREDNISONE 20 MG: 20 TABLET ORAL at 06:01

## 2024-05-29 RX ADMIN — SODIUM HYPOCHLORITE 1 ML: 1.25 SOLUTION TOPICAL at 08:19

## 2024-05-29 RX ADMIN — ATORVASTATIN CALCIUM 80 MG: 80 TABLET, FILM COATED ORAL at 21:43

## 2024-05-29 RX ADMIN — ASPIRIN 81 MG: 81 TABLET, COATED ORAL at 06:01

## 2024-05-29 RX ADMIN — ACETAMINOPHEN 650 MG: 325 TABLET, FILM COATED ORAL at 21:43

## 2024-05-29 RX ADMIN — GABAPENTIN 400 MG: 400 CAPSULE ORAL at 18:03

## 2024-05-29 RX ADMIN — FUROSEMIDE 40 MG: 10 INJECTION INTRAMUSCULAR; INTRAVENOUS at 06:01

## 2024-05-29 RX ADMIN — INSULIN HUMAN 7 UNITS: 100 INJECTION, SOLUTION PARENTERAL at 14:58

## 2024-05-29 RX ADMIN — MOMETASONE FUROATE AND FORMOTEROL FUMARATE DIHYDRATE 1 PUFF: 200; 5 AEROSOL RESPIRATORY (INHALATION) at 18:04

## 2024-05-29 RX ADMIN — FUROSEMIDE 40 MG: 10 INJECTION INTRAMUSCULAR; INTRAVENOUS at 17:00

## 2024-05-29 RX ADMIN — INSULIN HUMAN 12 UNITS: 100 INJECTION, SOLUTION PARENTERAL at 18:14

## 2024-05-29 SDOH — ECONOMIC STABILITY: TRANSPORTATION INSECURITY
IN THE PAST 12 MONTHS, HAS THE LACK OF TRANSPORTATION KEPT YOU FROM MEDICAL APPOINTMENTS OR FROM GETTING MEDICATIONS?: NO

## 2024-05-29 SDOH — ECONOMIC STABILITY: TRANSPORTATION INSECURITY
IN THE PAST 12 MONTHS, HAS LACK OF RELIABLE TRANSPORTATION KEPT YOU FROM MEDICAL APPOINTMENTS, MEETINGS, WORK OR FROM GETTING THINGS NEEDED FOR DAILY LIVING?: NO

## 2024-05-29 ASSESSMENT — COGNITIVE AND FUNCTIONAL STATUS - GENERAL
DAILY ACTIVITIY SCORE: 24
SUGGESTED CMS G CODE MODIFIER MOBILITY: CH
MOBILITY SCORE: 24
SUGGESTED CMS G CODE MODIFIER DAILY ACTIVITY: CH

## 2024-05-29 ASSESSMENT — FIBROSIS 4 INDEX
FIB4 SCORE: 1.76
FIB4 SCORE: 1.76

## 2024-05-29 ASSESSMENT — LIFESTYLE VARIABLES
AVERAGE NUMBER OF DAYS PER WEEK YOU HAVE A DRINK CONTAINING ALCOHOL: 0
HAVE PEOPLE ANNOYED YOU BY CRITICIZING YOUR DRINKING: NO
ON A TYPICAL DAY WHEN YOU DRINK ALCOHOL HOW MANY DRINKS DO YOU HAVE: 0
ALCOHOL_USE: NO
HAVE YOU EVER FELT YOU SHOULD CUT DOWN ON YOUR DRINKING: NO
TOTAL SCORE: 0
EVER HAD A DRINK FIRST THING IN THE MORNING TO STEADY YOUR NERVES TO GET RID OF A HANGOVER: NO
HOW MANY TIMES IN THE PAST YEAR HAVE YOU HAD 5 OR MORE DRINKS IN A DAY: 0
EVER FELT BAD OR GUILTY ABOUT YOUR DRINKING: NO
DOES PATIENT WANT TO STOP DRINKING: NO
TOTAL SCORE: 0
CONSUMPTION TOTAL: NEGATIVE
TOTAL SCORE: 0

## 2024-05-29 ASSESSMENT — PAIN DESCRIPTION - PAIN TYPE
TYPE: ACUTE PAIN

## 2024-05-29 ASSESSMENT — SOCIAL DETERMINANTS OF HEALTH (SDOH)
WITHIN THE PAST 12 MONTHS, YOU WORRIED THAT YOUR FOOD WOULD RUN OUT BEFORE YOU GOT THE MONEY TO BUY MORE: NEVER TRUE
WITHIN THE LAST YEAR, HAVE TO BEEN RAPED OR FORCED TO HAVE ANY KIND OF SEXUAL ACTIVITY BY YOUR PARTNER OR EX-PARTNER?: NO
WITHIN THE LAST YEAR, HAVE YOU BEEN AFRAID OF YOUR PARTNER OR EX-PARTNER?: NO
WITHIN THE PAST 12 MONTHS, THE FOOD YOU BOUGHT JUST DIDN'T LAST AND YOU DIDN'T HAVE MONEY TO GET MORE: NEVER TRUE
IN THE PAST 12 MONTHS, HAS THE ELECTRIC, GAS, OIL, OR WATER COMPANY THREATENED TO SHUT OFF SERVICE IN YOUR HOME?: NO
WITHIN THE LAST YEAR, HAVE YOU BEEN HUMILIATED OR EMOTIONALLY ABUSED IN OTHER WAYS BY YOUR PARTNER OR EX-PARTNER?: NO
WITHIN THE LAST YEAR, HAVE YOU BEEN KICKED, HIT, SLAPPED, OR OTHERWISE PHYSICALLY HURT BY YOUR PARTNER OR EX-PARTNER?: NO

## 2024-05-29 NOTE — ED NOTES
Med rec updated and complete. Allergies reviewed.   Confirmed name and date of birth.  Pt recently finished a zithromax dose rubén on 05/27/24. Pt is no longer taking Eliquis  ( medication was discontinued ) because he now has a watchman.  Last ASA dose 05/28/24.      [phone]

## 2024-05-29 NOTE — ASSESSMENT & PLAN NOTE
Severely uncontrolled  Start on insulin sliding scale with serial Accu-Checks  Check hemoglobin A1c  Hypoglycemic protocol in place  Diabetic education    Patient is only on oral medications for his diabetes.  Since he has progressive renal failure from his underlying transplant.  I suggested to the patient that he transition to insulin since he has uncontrolled diabetes and the renal failure is causing his oral medications to be ineffective.  After insulin he will have much better control of his fluid status.

## 2024-05-29 NOTE — WOUND TEAM
Wound consult received to assess the patient's left axilla. Per patient and bedside RN, Paris, the dressing was already changed this morning and the patient does not want the dressing removed right now. Dressing orders in place for normal saline wet to dry BID. Discontinued dakins orders, the patient states he has used dakins in the past and it olmos. Wound RN will return tomorrow for assessment.

## 2024-05-29 NOTE — ASSESSMENT & PLAN NOTE
History of kidney transplant  Monitor BMP and assess response  Avoid IV contrast/nephrotoxins/NSAIDs  Dose adjust meds for decreased GFR

## 2024-05-29 NOTE — CONSULTS
"[facility] -  CONSULTATION NOTE               Author: Roderick Ramirez M.D. Date & Time: 5/29/2024  9:46 AM       REASON FOR CONSULTATION:   SANKET    CHIEF COMPLAINT:   \"SOB\"    HISTORY OF PRESENT ILLNESS:    Patient is a 68 year old male with a PMHx of afib, CAD, renal transplant in 2010, PKD, HLD, HTN, and squamous cell carcinoma of the left axillary region, DM who presented to hospital for SOB.  Also has orthopnea and non-productive cough and lower extremity edema and chest pain.  Currently on mycophenolic acid and sirolimus and pred for his transplant.  Initial labs with creatinine 2.0, bicarb 17 , no new labs today.  UA with glucose but no protein or blood.  Looks like baseline creatinine about 1.4-1.5 but labile, patient states his normal creatinine normally ranges 1.9-2.0 recently though      REVIEW OF SYSTEMS:    10 point ROS was performed and is as per HPI or otherwise negative    PAST MEDICAL HISTORY:   Past Medical History:   Diagnosis Date    A-fib (HCC)     Arrhythmia     Benign essential hypertension     Diabetes (HCC)     type 2    Heart burn     Hemorrhagic disorder (HCC)     Eliquis    Hyperlipoproteinemia     Hypertension     not on meds anymore    Indigestion     Myocardial infarct (HCC) 2013    stent    Polycystic kidney 09/10/2010    RIGHT KIDNEY TRANSPLANT    Presence of Watchman left atrial appendage closure device 02/29/2024    Sleep apnea 01/30/2024    states he was told he had sleep apnea but has not had a sleep study    Snoring        PAST SURGICAL HISTORY:   Past Surgical History:   Procedure Laterality Date    STENT PLACEMENT  2013    cardiac    KNEE MANIPULATION  02/16/2012    Performed by LATOYA CONNER at SURGERY [facility]S    KNEE UNICOMPARTMENTAL  12/23/2011    Performed by LATOYA CONNER at SURGERY [facility]    KNEE ARTHROSCOPY  12/23/2011    Performed by LATOYA CONNER at SURGERY [facility]    KNEE ARTHROSCOPY  05/03/2011    Performed by SUSI " "HANANE FELICIANO at SURGERY SAME DAY [facility]    MENISCECTOMY, KNEE, MEDIAL  05/03/2011    Performed by HANANE GOLDMAN at SURGERY SAME DAY [facility] ORS    OTHER  09/10/2010    RIGHT KIDNEY TRANSPLANT    KNEE ARTHROPLASTY TOTAL  01/12/2007    RIGHT    KNEE ARTHROSCOPY  04/10/2006    RIGHT    OTHER ORTHOPEDIC SURGERY  07/08/1974    LEFT KNEE DEBRIDEMENT       FAMILY HISTORY:   No family history on file.    SOCIAL HISTORY:   Social History     Tobacco Use   Smoking Status Never    Passive exposure: Never   Smokeless Tobacco Never     Social History     Substance and Sexual Activity   Alcohol Use No     Social History     Substance and Sexual Activity   Drug Use No       HOME MEDICATIONS:   Reviewed and documented in chart    LABORATORY STUDIES:   Recent Labs     05/28/24 2002   SODIUM 135   POTASSIUM 5.0   CHLORIDE 101   CO2 17*   GLUCOSE 516*   BUN 49*   CREATININE 2.08*   CALCIUM 9.1       ALLERGIES:  Doxycycline    VS:  /72   Pulse 76   Temp 36 °C (96.8 °F) (Temporal)   Resp 16   Ht 1.956 m (6' 5\")   Wt 105 kg (230 lb 9.6 oz)   SpO2 96%   BMI 27.35 kg/m²     Physical Exam  Constitutional:       Appearance: He is ill-appearing.   HENT:      Head: Normocephalic.      Right Ear: External ear normal.      Left Ear: External ear normal.      Nose: Nose normal.   Eyes:      General: No scleral icterus.  Cardiovascular:      Rate and Rhythm: Normal rate.   Pulmonary:      Effort: Pulmonary effort is normal.      Breath sounds: Rales present.   Abdominal:      Palpations: Abdomen is soft.   Neurological:      Mental Status: He is alert.         FLUID BALANCE:  In: 0   Out: 375     IMAGING:  All imaging reviewed from admission to present day    IMPRESSION:  # SANKET    - Improved, appears baseline per patient around creat 1.9  # Renal transplant    - Continue with home IS medications  # Acute pulmonary edema    - Agree with diuresis  # Acidosis    - Resolving  # Immunosuppression medications  # Type 2 DM  # Atrial " fibrillation      PLAN:  - Creatinine down-trending  - Agree with continued diuresis  - Low salt diet  - Follow up heart studies  - Continue with home IS medications  - Dose all meds per eGFR   - Rest of management per primary team    Thank you for the consultation!

## 2024-05-29 NOTE — ED NOTES
Pt medications held due to already taking night doses at home, and per pharmacy, hold repeat lasix until AM

## 2024-05-29 NOTE — PROGRESS NOTES
4 Eyes Skin Assessment Completed by LAN Miller and LAN Manzano.    Head WDL  Ears WDL  Nose WDL  Mouth WDL  Neck WDL  Breast/Chest Redness and Blanching, R birth margarita  Shoulder Blades Redness and Blanching  Spine Redness and Blanching  (R) Arm/Elbow/Hand WDL  (L) Arm/Elbow/Hand Axillary wound, dressing in place  Abdomen WDL  Groin WDL  Scrotum/Coccyx/Buttocks Redness and Blanching, bruising  (R) Leg WDL  (L) Leg WDL  (R) Heel/Foot/Toe WDL  (L) Heel/Foot/Toe WDL          Devices In Places Tele Box, Pulse Ox, and Nasal Cannula      Interventions In Place Gray Ear Foams and Pillows    Possible Skin Injury Yes    Pictures Uploaded Into Epic Yes  Wound Consult Placed Yes  RN Wound Prevention Protocol Ordered Yes

## 2024-05-29 NOTE — CARE PLAN
The patient is Stable - Low risk of patient condition declining or worsening    Shift Goals  Clinical Goals: Safety, strict I&Os  Patient Goals: Rest  Family Goals: JUDITH    Progress made toward(s) clinical / shift goals:    Problem: Fall Risk  Goal: Patient will remain free from falls  Outcome: Progressing    Bed alarm in place. Pt calls for assistance and is provided appropriate assistive devices when needed. Treaded socks used when ambulating.      Patient's intake and output monitored throughout the night. Patient understands why we are monitoring his intake and output. Patient understands to call this RN if they have worsening signs or symptoms of fluid volume overload.    Patient is not progressing towards the following goals:

## 2024-05-29 NOTE — DISCHARGE PLANNING
HTH/SCP TCN chart review completed. Collaborated with DWAINE Stephens prior to meeting with the pt. The most current review of medical record, knowledge of pt's PLOF and social support, LACE+ score of 75, 6 clicks scores of 24 mobility were considered.      Pt seen at bedside. Introduced TCN program. Provided education regarding post acute levels of care. Education provided regarding case management policy for blanket SNF referrals. Discussed HTH/SCP plan benefits. Pt verbalizes understanding.     Pt reports no functional concerns with dc to home once medically cleared. Per chart review and pt report he remains ambulatory with steady gait. He reports he will have transport for dc to home as well. He reports that though currently on supplemental 02, he has been told by providers that this will be weaned prior to dc to home.     Given aforementioned, no additional provider requests and no choice indicated. TCN will continue to follow and collaborate with discharge planning team as additional post acute needs arise. Thank you.     Completed today:  Choice obtained: none indicated  SCP with [facility] PCP. discussed possible outpatient transitional PCP follow up if indicated; however, pt reports he is not interested and will schedule on his own if indicated.

## 2024-05-29 NOTE — PROGRESS NOTES
Monitor summary: Afib 96-96, LA -, QRS -0.12, QT -, with a BBB per strip from the monitor room.

## 2024-05-29 NOTE — ED PROVIDER NOTES
ER Provider Note    Scribed for Dr. Jama Quiñones M.D. by Rick Fajardo. 5/28/2024  8:44 PM    Primary Care Provider: Ganesh Benton III, M.D.    CHIEF COMPLAINT  Chief Complaint   Patient presents with    Shortness of Breath     Pt reports he has been having lymphedema to L arm and they have been wrapping it and feels as though the fluid has entered his lungs and he is now having some difficulty breathing.     High Blood Sugar     BGL-473     HPI/ROS  LIMITATION TO HISTORY   Select: : None    OUTSIDE HISTORIAN(S):  None    Jama Altman is a 68 y.o. male who presents to the ED for sob and swelling throughout the body that has been worsening over the last week. Today he has been having a non-productive mild cough. The swelling starts at the left arm and is radiating to his back. He denies chest pain, nausea, or vomiting. The patient has a chronic wound in left axilla that has been continually packed and dressed. There is no increased pain in that area. The patient is receiving IV fluids because his blood sugar is high.    PAST MEDICAL HISTORY  Past Medical History:   Diagnosis Date    A-fib (HCC)     Arrhythmia     Benign essential hypertension     Diabetes (HCC)     type 2    Heart burn     Hemorrhagic disorder (HCC)     Eliquis    Hyperlipoproteinemia     Hypertension     not on meds anymore    Indigestion     Myocardial infarct (HCC) 2013    stent    Polycystic kidney 09/10/2010    RIGHT KIDNEY TRANSPLANT    [facility] [employer] left atrial appendage closure device 02/29/2024    Sleep apnea 01/30/2024    states he was told he had sleep apnea but has not had a sleep study    Snoring        SURGICAL HISTORY  Past Surgical History:   Procedure Laterality Date    STENT PLACEMENT  2013    cardiac    KNEE MANIPULATION  02/16/2012    Performed by LATOYA CONNER at SURGERY [facility]    KNEE UNICOMPARTMENTAL  12/23/2011    Performed by LATOYA CONNER at SURGERY [facility]    KNEE ARTHROSCOPY  12/23/2011     Performed by LATOYA CONNER at SURGERY [facility]S    KNEE ARTHROSCOPY  05/03/2011    Performed by HANANE GOLDMAN at SURGERY SAME DAY [facility] ORS    MENISCECTOMY, KNEE, MEDIAL  05/03/2011    Performed by HANANE GOLDMAN at SURGERY SAME DAY [facility]    OTHER  09/10/2010    RIGHT KIDNEY TRANSPLANT    KNEE ARTHROPLASTY TOTAL  01/12/2007    RIGHT    KNEE ARTHROSCOPY  04/10/2006    RIGHT    OTHER ORTHOPEDIC SURGERY  07/08/1974    LEFT KNEE DEBRIDEMENT       FAMILY HISTORY  No family history on file.    SOCIAL HISTORY   reports that he has never smoked. He has never been exposed to tobacco smoke. He has never used smokeless tobacco. He reports that he does not drink alcohol and does not use drugs.    CURRENT MEDICATIONS  Previous Medications    ACARBOSE (PRECOSE) 50 MG TAB    TAKE 1 TABLET BY MOUTH THREE TIMES DAILY WITH MEALS    ACETAMINOPHEN-CODEINE #3 (TYLENOL #3) 300-30 MG TAB    TAKE 1 TABLET BY MOUTH FOUR TIMES DAILY FOR 6 HOURS AS NEEDED FOR CANCER PAIN    ALBUTEROL 108 (90 BASE) MCG/ACT AERO SOLN INHALATION AEROSOL    Inhale 2 Puffs every four hours as needed for Shortness of Breath.    ASPIRIN 81 MG EC TABLET    Take 1 Tablet by mouth every day.    ATORVASTATIN (LIPITOR) 80 MG TABLET    Take 1 Tablet by mouth at bedtime.    AZITHROMYCIN (ZITHROMAX) 250 MG TAB    Take 2 po today and then 1 a day    BENZONATATE (TESSALON) 200 MG CAPSULE    Take 1 Capsule by mouth 3 times a day as needed for Cough.    FLUTICASONE-SALMETEROL (ADVAIR) 250-50 MCG/ACT AEROSOL POWDER, BREATH ACTIVATED    Inhale 1 Puff every 12 hours.    GABAPENTIN (NEURONTIN) 400 MG CAP    Take 1 Capsule by mouth 2 times a day.    GLYBURIDE (DIABETA) 5 MG TAB    TAKE 2 TABLETS BY MOUTH TWICE DAILY WITH MEALS    METOPROLOL SR (TOPROL XL) 25 MG TABLET SR 24 HR    Take 1 Tablet by mouth every day. May take additional one tab daily for heart rate sustaining >110 BPM.    METRONIDAZOLE (FLAGYL) 500 MG TAB    Crush 1-2 tablets and apply dirrectly into  "wound bed daily as needed for odor control.    OMEPRAZOLE (PRILOSEC) 20 MG DELAYED-RELEASE CAPSULE    Take 1 Capsule by mouth every day. Indications: Heartburn    PIOGLITAZONE (ACTOS) 45 MG TAB    TAKE ONE TABLET BY MOUTH ONCE DAILY    PREDNISONE (DELTASONE) 10 MG TAB    Take 20 mg by mouth every day.    SIROLIMUS 2 MG TAB    Taking 2 a day    SODIUM BICARBONATE (SODIUM BICARBONATE) 650 MG TAB    Take 1 Tablet by mouth 2 (two) times a day.    TADALAFIL (CIALIS) 5 MG TABLET    : TAKE 1 TABLETS 30 MINUTES BEFORE SEXUAL ACTIVITY, DO NOT EXCEED MORE THAN ONE DOSE A DAY    TRADJENTA 5 MG TAB TABLET    TAKE 1 TABLET BY MOUTH EVERY DAY       ALLERGIES  Doxycycline    PHYSICAL EXAM  /66   Pulse (!) 109   Temp 36.6 °C (97.8 °F) (Temporal)   Resp 18   Ht 1.956 m (6' 5\")   Wt 107 kg (236 lb 1.8 oz)   SpO2 95%   BMI 28.00 kg/m²   Constitutional: Alert in no apparent distress.  HENT: No signs of trauma, Bilateral external ears normal, Nose normal.   Eyes: Pupils are equal and reactive, Conjunctiva normal, Non-icteric.   Neck: Normal range of motion, No tenderness, Supple,   Cardiovascular: Regular rate and rhythm, no murmurs.   Thorax & Lungs: Diminished lung sounds bilaterally, No respiratory distress, No wheezing, No chest tenderness.   Abdomen: Bowel sounds normal, Soft, No tenderness, Abdomen is distended.  Skin: Warm, Dry, No erythema, No rash.   Back: No bony tenderness, No CVA tenderness. Pitting edema noted to hips and back.   Extremities: No edema, No tenderness, No cyanosis, no tenderness  Neurologic:   Psychiatric: Affect normal     DIAGNOSTIC STUDIES & PROCEDURES    Labs:   Labs Reviewed   CBC WITH DIFFERENTIAL - Abnormal; Notable for the following components:       Result Value    Hemoglobin 12.5 (*)     Hematocrit 40.7 (*)     MCH 26.3 (*)     MCHC 30.7 (*)     Platelet Count 122 (*)     Neutrophils-Polys 94.00 (*)     Lymphocytes 1.70 (*)     Neutrophils (Absolute) 9.40 (*)     Lymphs (Absolute) 0.17 " (*)     All other components within normal limits   COMP METABOLIC PANEL - Abnormal; Notable for the following components:    Co2 17 (*)     Anion Gap 17.0 (*)     Glucose 516 (*)     Bun 49 (*)     Creatinine 2.08 (*)     All other components within normal limits   ESTIMATED GFR - Abnormal; Notable for the following components:    GFR (CKD-EPI) 34 (*)     All other components within normal limits   URINALYSIS,CULTURE IF INDICATED - Abnormal; Notable for the following components:    Glucose >=1000 (*)     All other components within normal limits   PROBRAIN NATRIURETIC PEPTIDE, NT - Abnormal; Notable for the following components:    NT-proBNP 8108 (*)     All other components within normal limits   PROTEIN/CREAT RATIO URINE - Abnormal; Notable for the following components:    Protein Creatinine Ratio 92 (*)     All other components within normal limits   TROPONIN - Abnormal; Notable for the following components:    Troponin T 82 (*)     All other components within normal limits   POCT GLUCOSE DEVICE RESULTS - Abnormal; Notable for the following components:    POC Glucose, Blood 471 (*)     All other components within normal limits   POCT GLUCOSE DEVICE RESULTS - Abnormal; Notable for the following components:    POC Glucose, Blood 390 (*)     All other components within normal limits   DIFFERENTIAL MANUAL   PERIPHERAL SMEAR REVIEW   PLATELET ESTIMATE   MORPHOLOGY   PROCALCITONIN   URINE SODIUM RANDOM   URINE POTASSIUM RANDOM   URINE CHLORIDE RANDOM   COV-2, FLU A/B, AND RSV BY PCR ([employer])   TROPONIN   CULTURE RESPIRATORY W/ GR[facility] STN   CBC WITH DIFFERENTIAL   COMP METABOLIC PANEL   POC COV-2, FLU A/B, RSV BY PCR      All labs reviewed by me.    EKG Interpretation:  Interpreted by me  Results for orders placed or performed during the hospital encounter of 05/28/24   EKG   Result Value Ref Range    Report       [facility] Emergency Dept.    Test Date:  2024-05-28  Pt Name:    CIARA FORRESTER                    Department: ER  MRN:        [id number]                      Room:  Gender:     Male                         Technician: [id number]  :        1955                   Requested By:ER TRIAGE PROTOCOL  Order #:    [id number]                    Reading MD:    Measurements  Intervals                                Axis  Rate:       123                          P:          0  UT:         0                            QRS:        150  QRSD:       153                          T:          7  QT:         337  QTc:        482    Interpretive Statements  Atrial fibrillation  Right bundle branch block  Compared to ECG 2024 20:27:13  No significant changes          Radiology:   The attending Emergency Physician has independently interpreted the diagnostic imaging associated with this visit and is awaiting the final reading from the radiologist, which will be displayed below.    Preliminary interpretation is a follows: Lower lobe infiltrates as well as pleural effusions noted.  Radiologist interpretation:      DX-CHEST-PORTABLE (1 VIEW)   Final Result         1.  Hazy bilateral lower lobe infiltrates   2.  Trace bilateral pleural effusions   3.  Atherosclerosis      EC-ECHOCARDIOGRAM COMPLETE W/O CONT    (Results Pending)        COURSE & MEDICAL DECISION MAKING    ED Observation Status? No; Patient does not meet criteria for ED Observation.     INITIAL ASSESSMENT AND PLAN  Care Narrative:       8:44 PM - Patient seen and evaluated at bedside. Discussed plan of care, including EKG, imaging, and labs. Patient agrees to plan of care.    9:14 PM - I discussed the patient's case and the above findings with Dr. Gutiérrez (Nephrology) who recommends diuretics and insulin.      10:08 PM - I discussed the patient's case and the above findings with Dr. Osuna (Internal Medicine) who agrees to admit the patient for hospitalization.      ADDITIONAL PROBLEM LIST AND DISPOSITION   Patient appears volume overloaded.  This is seen on  x-ray.  The patient is likely volume overloaded from infusions as well as mobilization of fluid in his left upper extremity.  He has mildly low hemoglobin no leukocytosis.  Creatinine is around baseline.  Glucose is significantly elevated.  proBNP is also elevated.  He will likely need an echo.  EKG shows no ischemia.  The patient will be admitted to the hospital in guarded condition.  I consulted with nephrology given the patient's renal transplant and fluid overload and Lasix was recommended as well as insulin.  The patient was treated aggressively and resuscitated.    CRITICAL CARE  The very real possibilty of a deterioration of this patient's condition required the highest level of my preparedness for sudden, emergent intervention.  I provided critical care services, which included medication orders, frequent reevaluations of the patient's condition and response to treatment, ordering and reviewing test results, and discussing the case with various consultants.  The critical care time associated with the care of the patient was 34 minutes. Review chart for interventions. This time is exclusive of any other billable procedures.                  DISPOSITION AND DISCUSSIONS  I have discussed management of the patient with the following physicians and LINDA's: Dr. Gutiérrez (Nephrology) and Dr. Osuna (Internal Medicine)    Discussion of management with other Hospitals in [place] or appropriate source(s): None       Barriers to care at this time, including but not limited to:  None .     Decision tools and prescription drugs considered including, but not limited to: Medication modification insulin and Lasix .    DISPOSITION:  Patient will be hospitalized by Dr. Osuna (Internal Medicine) in guarded condition.     FINAL IMPRESSION   1. SANKET (acute kidney injury) (HCC)    2. Hypervolemia, unspecified hypervolemia type    3. Hypoxic         I, Rick Fajardo (Scribe), am scribing for, and in the presence of, Jama Quiñones,  M.D..    Electronically signed by: Rick Fajardo (Scribe), 5/28/2024    IJama M.D. personally performed the services described in this documentation, as scribed by Rick Fajardo in my presence, and it is both accurate and complete.    The note accurately reflects work and decisions made by me.  Jama Quiñones M.D.  5/29/2024  3:59 AM

## 2024-05-29 NOTE — ASSESSMENT & PLAN NOTE
I have ordered a cardiac echo  Monitor on telemetry  Trend troponins  Strict ins and outs and daily weights  Start IV Lasix 40 twice daily

## 2024-05-29 NOTE — CONSULTS
Diabetes education: Pt has hx of diabetes, on oral medications and to follow with [facility] pharmacy outpatient for diabetes management. Pt was admitted with blood sugar of 516 and Hga1c of 11.2%. Pt is currently on regular insulin per sliding scale coverage ac and hs with blood sugar of 390 ( 10 units),149, and 279 ( 7 units). Pt is also on Prednisone 20 mg every day with first dose at 0601 this am.  Met with pt who states he did not want to take insulin if he did not have to, but would agree to take it if he had to.  Plan: CDE to follow up tomorrow to instruct on insulin pens. Pt has an old Freestyle meter ( no longer covered by [facility]) and old strips. Pt will need a new One touch verio flex meter, strips and lancets ordered at discharge regardless if discharging on insulin or not ( only frequency of finger sticks would change).

## 2024-05-29 NOTE — ED TRIAGE NOTES
Chief Complaint   Patient presents with    Shortness of Breath     Pt reports he has been having lymphedema to L arm and they have been wrapping it and feels as though the fluid has entered his lungs and he is now having some difficulty breathing.     High Blood Sugar     BGL-473       Pt to triage with steady gait for above complaint. Presents with SOB due to possible fluid overload. Pt has swelling to L arm that is being treated, pt's nephrologist suggested pt come to ED tonight to have fluid drained. Pt has one kidney currently.   Pt has been on steroids and has been hyperglycemic, BGL in triage is 473     Pt back to lobby, educated on triage process and encourage to alert staff of any changes.     Vitals:    05/28/24 1912   BP: 116/69   Pulse: 65   Resp: 18   Temp: 36.6 °C (97.8 °F)   SpO2: 94%

## 2024-05-29 NOTE — H&P
Hospital Medicine History & Physical Note    Date of Service  5/28/2024    Primary Care Physician  Ganesh Benton III, M.D.    Consultants  nephrology    Specialist Names:      Code Status  Full Code    Chief Complaint  Chief Complaint   Patient presents with    Shortness of Breath     Pt reports he has been having lymphedema to L arm and they have been wrapping it and feels as though the fluid has entered his lungs and he is now having some difficulty breathing.     High Blood Sugar     [id number]       History of Presenting Illness  Jama Altman is a 68 y.o. male who presented 5/28/2024 with past medical history atrial fibrillation, status post watchman, coronary disease, renal transplant 2010, polycystic kidney disease, hyperlipidemia, hypertension, squamous cell carcinoma left axillary region, diabetes who presents to the hospital for shortness of breath for the past 1 week.  Patient does have shortness of breath on exertion.  It is associated with orthopnea, nonproductive cough, lower extremity edema and chest pain.  The chest pain is center of her chest, nonradiating but does get worse with exertion.  Patient denies any fevers, chills, nausea, vomiting, diarrhea.  Patient does have a left axillary wound and follows the wound care clinic.  Patient states that he takes care of her well and denies any drainage or redness in the area.  2 months ago he was transitioning from mycophenolic acid and tacrolimus to sirolimus and 20 mg of prednisone.    Chest x-ray interpreted by me found bilateral infiltrates, bilateral pulm edema, bilateral pleural effusion  EKG interpreted by me found atrial fibrillation, right bundle branch block, PVCs    COVID test is still pending    I discussed the plan of care with patient.    Review of Systems  Review of Systems   Constitutional:  Positive for malaise/fatigue. Negative for chills, diaphoresis and fever.   HENT:  Negative for congestion, ear discharge, ear pain,  hearing loss, nosebleeds, sinus pain, sore throat and tinnitus.    Eyes:  Negative for blurred vision, double vision, photophobia and pain.   Respiratory:  Positive for cough and shortness of breath. Negative for hemoptysis, sputum production, wheezing and stridor.    Cardiovascular:  Positive for chest pain, palpitations, orthopnea, leg swelling and PND. Negative for claudication.   Gastrointestinal:  Negative for abdominal pain, blood in stool, constipation, diarrhea, heartburn, melena, nausea and vomiting.   Genitourinary:  Negative for dysuria, flank pain, frequency, hematuria and urgency.   Musculoskeletal:  Negative for back pain, falls, joint pain, myalgias and neck pain.   Skin:  Negative for itching and rash.   Neurological:  Negative for dizziness, tingling, tremors, weakness and headaches.   Endo/Heme/Allergies:  Negative for environmental allergies and polydipsia. Does not bruise/bleed easily.   Psychiatric/Behavioral:  Negative for depression, hallucinations, substance abuse and suicidal ideas.        Past Medical History   has a past medical history of A-fib ([facility]), Arrhythmia, Benign essential hypertension, Diabetes (HCC), Heart burn, Hemorrhagic disorder ([facility]), Hyperlipoproteinemia, Hypertension, Indigestion, Myocardial infarct (HCC) (2013), Polycystic kidney (09/10/2010), Presence of Watchman left atrial appendage closure device (02/29/2024), Sleep apnea (01/30/2024), and Snoring.    Surgical History   has a past surgical history that includes knee arthroplasty total (01/12/2007); knee arthroscopy (04/10/2006); other orthopedic surgery (07/08/1974); other (09/10/2010); knee arthroscopy (05/03/2011); meniscectomy, knee, medial (05/03/2011); knee unicompartmental (12/23/2011); knee arthroscopy (12/23/2011); knee manipulation (02/16/2012); and stent placement (2013).     Family History  Family history reviewed with patient. There is no family history that is pertinent to the chief complaint.     Social  History   reports that he has never smoked. He has never been exposed to tobacco smoke. He has never used smokeless tobacco. He reports that he does not drink alcohol and does not use drugs.    Allergies  Allergies   Allergen Reactions    Doxycycline Rash     Sweats and shakes: 9/28/17: Clarified allergy with patient. Allergy was in 1998 and he doesn't remember what happened. He thought the medication is for pain.  Tolerates doxycycline 9/2017       Medications  Prior to Admission Medications   Prescriptions Last Dose Informant Patient Reported? Taking?   Sirolimus 2 MG Tab 5/28/2024 at 0900 Patient, Family Member Yes Yes   Sig: Take 6 mg by mouth every day. 2 mg x 3 tablets = 6 mg   TRADJENTA 5 MG Tab tablet 5/28/2024 at 0800 Patient, Family Member No Yes   Sig: TAKE 1 TABLET BY MOUTH EVERY DAY   acarbose (PRECOSE) 50 MG Tab hasn' t at started Patient, Family Member No No   Sig: TAKE 1 TABLET BY MOUTH THREE TIMES DAILY WITH MEALS   albuterol 108 (90 Base) MCG/ACT Aero Soln inhalation aerosol > 3 days at unknown Patient, Family Member No No   Sig: Inhale 2 Puffs every four hours as needed for Shortness of Breath.   aspirin 81 MG EC tablet 5/28/2024 at 0900 Patient, Family Member No Yes   Sig: Take 1 Tablet by mouth every day.   atorvastatin (LIPITOR) 80 MG tablet 5/27/2024 at 2200 Patient, Family Member No Yes   Sig: Take 1 Tablet by mouth at bedtime.   azithromycin (ZITHROMAX) 250 MG Tab 5/27/2024 at finished Patient, Family Member No Yes   Sig: Take 2 po today and then 1 a day   benzonatate (TESSALON) 200 MG capsule 5/26/2024 at 1500 Patient, Family Member No No   Sig: Take 1 Capsule by mouth 3 times a day as needed for Cough.   fluticasone-salmeterol (ADVAIR) 250-50 MCG/ACT AEROSOL POWDER, BREATH ACTIVATED > 1 week at unknown Patient, Family Member No Yes   Sig: Inhale 1 Puff every 12 hours.   gabapentin (NEURONTIN) 400 MG Cap 5/28/2024 at 0900 Patient, Family Member No Yes   Sig: Take 1 Capsule by mouth 2 times  a day.   glyBURIDE (DIABETA) 5 MG Tab 5/28/2024 at 0900 Patient, Family Member No Yes   Sig: TAKE 2 TABLETS BY MOUTH TWICE DAILY WITH MEALS   metoprolol SR (TOPROL XL) 25 MG TABLET SR 24 HR 5/28/2024 at 0900 Patient, Family Member No Yes   Sig: Take 1 Tablet by mouth every day. May take additional one tab daily for heart rate sustaining >110 BPM.   metroNIDAZOLE (FLAGYL) 500 MG Tab 5/25/2024 at 1000 Patient, Family Member No No   Sig: Crush 1-2 tablets and apply dirrectly into wound bed daily as needed for odor control.   omeprazole (PRILOSEC) 20 MG delayed-release capsule 5/28/2024 at 0800 Patient, Family Member No Yes   Sig: Take 1 Capsule by mouth every day. Indications: Heartburn   pioglitazone (ACTOS) 45 MG Tab 5/28/2024 at 0900 Patient, Family Member No Yes   Sig: TAKE ONE TABLET BY MOUTH ONCE DAILY   predniSONE (DELTASONE) 20 MG Tab 5/28/2024 at 0900 Patient, Family Member Yes Yes   Sig: Take 20 mg by mouth every day.   tadalafil (CIALIS) 5 MG tablet > 1 week at unknwon Patient, Family Member No No   Sig: : TAKE 1 TABLETS 30 MINUTES BEFORE SEXUAL ACTIVITY, DO NOT EXCEED MORE THAN ONE DOSE A DAY      Facility-Administered Medications: None       Physical Exam  Temp:  [36.4 °C (97.5 °F)-36.6 °C (97.8 °F)] 36.6 °C (97.8 °F)  Pulse:  [] 112  Resp:  [18-22] 22  BP: (115-139)/(66-85) 126/85  SpO2:  [88 %-97 %] 96 %  Blood Pressure : 121/71   Temperature: 36.6 °C (97.8 °F)   Pulse: (!) 115   Respiration: 20   Pulse Oximetry: 96 %       Physical Exam  Vitals and nursing note reviewed.   Constitutional:       General: He is not in acute distress.     Appearance: Normal appearance. He is not ill-appearing, toxic-appearing or diaphoretic.   HENT:      Head: Normocephalic and atraumatic.      Nose: No congestion or rhinorrhea.      Mouth/Throat:      Pharynx: No posterior oropharyngeal erythema.   Eyes:      General: No scleral icterus.        Right eye: No discharge.   Cardiovascular:      Rate and Rhythm: Regular  "rhythm. Tachycardia present.      Pulses: Normal pulses.      Heart sounds: Normal heart sounds. No murmur heard.     No friction rub. No gallop.   Pulmonary:      Effort: Pulmonary effort is normal. No respiratory distress.      Breath sounds: No stridor. Rales present. No wheezing or rhonchi.   Abdominal:      General: There is no distension.      Tenderness: There is no abdominal tenderness.   Musculoskeletal:         General: No swelling, tenderness, deformity or signs of injury.      Cervical back: Normal range of motion.      Right lower leg: Edema present.      Left lower leg: Edema present.   Skin:     Capillary Refill: Capillary refill takes more than 3 seconds.      Coloration: Skin is not jaundiced or pale.      Findings: No bruising, erythema, lesion or rash.   Neurological:      General: No focal deficit present.      Mental Status: He is alert and oriented to person, place, and time.         Laboratory:  Recent Labs     05/28/24 2002   WBC 10.0   RBC 4.75   HEMOGLOBIN 12.5*   HEMATOCRIT 40.7*   MCV 85.7   MCH 26.3*   MCHC 30.7*   RDW 49.1   PLATELETCT 122*   MPV 11.7     Recent Labs     05/28/24 2002   SODIUM 135   POTASSIUM 5.0   CHLORIDE 101   CO2 17*   GLUCOSE 516*   BUN 49*   CREATININE 2.08*   CALCIUM 9.1     Recent Labs     05/28/24 2002   ALTSGPT 22   ASTSGOT 15   ALKPHOSPHAT 93   TBILIRUBIN 0.3   GLUCOSE 516*         No results for input(s): \"NTPROBNP\" in the last 72 hours.      No results for input(s): \"TROPONINT\" in the last 72 hours.    Imaging:  DX-CHEST-PORTABLE (1 VIEW)   Final Result         1.  Hazy bilateral lower lobe infiltrates   2.  Trace bilateral pleural effusions   3.  Atherosclerosis      EC-ECHOCARDIOGRAM COMPLETE W/O CONT    (Results Pending)       X-Ray:  I have personally reviewed the images and compared with prior images.  EKG:  I have personally reviewed the images and compared with prior images.    Assessment/Plan:  Justification for Admission Status  I anticipate " this patient will require at least two midnights for appropriate medical management, necessitating inpatient admission because acute pulmonary edema    Patient will need a Telemetry bed on MEDICAL service .  The need is secondary to acute pulmonary edema.    * Acute pulmonary edema (HCC)  Assessment & Plan  I have ordered a cardiac echo  Monitor on telemetry  Trend troponins  Strict ins and outs and daily weights  Start IV Lasix 40 twice daily    Acute renal failure superimposed on stage 3a chronic kidney disease, unspecified acute renal failure type (HCC)- (present on admission)  Assessment & Plan  History of kidney transplant  Monitor BMP and assess response  Avoid IV contrast/nephrotoxins/NSAIDs  Dose adjust meds for decreased GFR      Long term (current) use of systemic steroids- (present on admission)  Assessment & Plan  Patient is not taking Bactrim prophylaxis    Immunosuppressed status (HCC)- (present on admission)  Assessment & Plan  History of cancer and immunosuppression from kidney transplant    Acute respiratory failure with hypoxia (HCC)- (present on admission)  Assessment & Plan  On 2 L of O2 above baseline    Type 2 diabetes mellitus (HCC)- (present on admission)  Assessment & Plan  Severely uncontrolled  Start on insulin sliding scale with serial Accu-Checks  Check hemoglobin A1c  Hypoglycemic protocol in place  Diabetic education    Patient is only on oral medications for his diabetes.  Since he has progressive renal failure from his underlying transplant.  I suggested to the patient that he transition to insulin since he has uncontrolled diabetes and the renal failure is causing his oral medications to be ineffective.  After insulin he will have much better control of his fluid status.    History of atrial fibrillation- (present on admission)  Assessment & Plan  Rate controlled  History of Watchman device  Continue metoprolol    History of renal transplant- (present on admission)  Assessment &  Plan  Continue sirolimus and prednisone        VTE prophylaxis: SCDs/TEDs    I discussed advance care planning for at least 20 minutes with the patient , including diagnosis, prognosis, plan of care, risks and benefits of any therapies that could be offered, as well as alternatives including palliation and hospice, as appropriate. The patient has opted Full code Time spent is exclusive of evaluation and management or other separately billable procedures.

## 2024-05-29 NOTE — PROGRESS NOTES
Assumed care of pt at 0700. Report received and bedside rounding completed with night RN. Pt is calm no SOB, no acute distress noted.    POC discussed with pt, questions answered. White board updated.Call light and pt belongings within reach - hourly rounding in place. See flowsheets for assessment.

## 2024-05-29 NOTE — PROGRESS NOTES
Diabetes education: Met with pt this afternoon. Please see consult note.  Plan: CDE to follow up tomorrow to instruct on insulin pens. Pt has an old Freestyle meter ( no longer covered by [facility]) and old strips. Pt will need a new One touch verio flex meter, strips and lancets ordered at discharge regardless if discharging on insulin or not ( only frequency of finger sticks would change).

## 2024-05-30 ENCOUNTER — APPOINTMENT (OUTPATIENT)
Dept: CARDIOLOGY | Facility: MEDICAL CENTER | Age: 69
End: 2024-05-30
Attending: STUDENT IN AN ORGANIZED HEALTH CARE EDUCATION/TRAINING PROGRAM
Payer: MEDICARE

## 2024-05-30 LAB
ANION GAP SERPL CALC-SCNC: 16 MMOL/L (ref 7–16)
BUN SERPL-MCNC: 51 MG/DL (ref 8–22)
CALCIUM SERPL-MCNC: 9.3 MG/DL (ref 8.5–10.5)
CHLORIDE SERPL-SCNC: 99 MMOL/L (ref 96–112)
CO2 SERPL-SCNC: 22 MMOL/L (ref 20–33)
CREAT SERPL-MCNC: 1.7 MG/DL (ref 0.5–1.4)
GFR SERPLBLD CREATININE-BSD FMLA CKD-EPI: 43 ML/MIN/1.73 M 2
GLUCOSE BLD STRIP.AUTO-MCNC: 326 MG/DL (ref 65–99)
GLUCOSE BLD STRIP.AUTO-MCNC: 352 MG/DL (ref 65–99)
GLUCOSE SERPL-MCNC: 270 MG/DL (ref 65–99)
LV EJECT FRACT  99904: 45
LV EJECT FRACT MOD 2C 99903: 53.65
LV EJECT FRACT MOD 4C 99902: 53.91
LV EJECT FRACT MOD BP 99901: 54.45
POTASSIUM SERPL-SCNC: 5 MMOL/L (ref 3.6–5.5)
SODIUM SERPL-SCNC: 137 MMOL/L (ref 135–145)

## 2024-05-30 PROCEDURE — 700102 HCHG RX REV CODE 250 W/ 637 OVERRIDE(OP): Performed by: HOSPITALIST

## 2024-05-30 PROCEDURE — 700117 HCHG RX CONTRAST REV CODE 255: Performed by: STUDENT IN AN ORGANIZED HEALTH CARE EDUCATION/TRAINING PROGRAM

## 2024-05-30 PROCEDURE — 770020 HCHG ROOM/CARE - TELE (206)

## 2024-05-30 PROCEDURE — A9270 NON-COVERED ITEM OR SERVICE: HCPCS | Performed by: HOSPITALIST

## 2024-05-30 PROCEDURE — A9270 NON-COVERED ITEM OR SERVICE: HCPCS

## 2024-05-30 PROCEDURE — 36415 COLL VENOUS BLD VENIPUNCTURE: CPT

## 2024-05-30 PROCEDURE — 700111 HCHG RX REV CODE 636 W/ 250 OVERRIDE (IP): Mod: JZ | Performed by: HOSPITALIST

## 2024-05-30 PROCEDURE — 99233 SBSQ HOSP IP/OBS HIGH 50: CPT | Performed by: STUDENT IN AN ORGANIZED HEALTH CARE EDUCATION/TRAINING PROGRAM

## 2024-05-30 PROCEDURE — 80048 BASIC METABOLIC PNL TOTAL CA: CPT

## 2024-05-30 PROCEDURE — 82962 GLUCOSE BLOOD TEST: CPT | Mod: 91

## 2024-05-30 PROCEDURE — 700102 HCHG RX REV CODE 250 W/ 637 OVERRIDE(OP)

## 2024-05-30 PROCEDURE — 93306 TTE W/DOPPLER COMPLETE: CPT | Mod: 26 | Performed by: INTERNAL MEDICINE

## 2024-05-30 PROCEDURE — 93306 TTE W/DOPPLER COMPLETE: CPT

## 2024-05-30 RX ORDER — OXYCODONE HYDROCHLORIDE 5 MG/1
5 TABLET ORAL
Status: DISCONTINUED | OUTPATIENT
Start: 2024-05-30 | End: 2024-05-31 | Stop reason: HOSPADM

## 2024-05-30 RX ORDER — OXYCODONE HYDROCHLORIDE 5 MG/1
2.5 TABLET ORAL
Status: DISCONTINUED | OUTPATIENT
Start: 2024-05-30 | End: 2024-05-31 | Stop reason: HOSPADM

## 2024-05-30 RX ORDER — FUROSEMIDE 40 MG/1
40 TABLET ORAL
Status: DISCONTINUED | OUTPATIENT
Start: 2024-05-31 | End: 2024-05-31 | Stop reason: HOSPADM

## 2024-05-30 RX ORDER — FUROSEMIDE 40 MG/1
40 TABLET ORAL
Status: DISCONTINUED | OUTPATIENT
Start: 2024-05-31 | End: 2024-05-30

## 2024-05-30 RX ORDER — BENZONATATE 100 MG/1
100 CAPSULE ORAL 3 TIMES DAILY PRN
Status: DISCONTINUED | OUTPATIENT
Start: 2024-05-30 | End: 2024-05-31 | Stop reason: HOSPADM

## 2024-05-30 RX ORDER — HYDROMORPHONE HYDROCHLORIDE 1 MG/ML
0.25 INJECTION, SOLUTION INTRAMUSCULAR; INTRAVENOUS; SUBCUTANEOUS
Status: DISCONTINUED | OUTPATIENT
Start: 2024-05-30 | End: 2024-05-31 | Stop reason: HOSPADM

## 2024-05-30 RX ADMIN — FUROSEMIDE 40 MG: 10 INJECTION INTRAMUSCULAR; INTRAVENOUS at 05:27

## 2024-05-30 RX ADMIN — SIROLIMUS 6 MG: 0.5 TABLET ORAL at 05:28

## 2024-05-30 RX ADMIN — GABAPENTIN 400 MG: 400 CAPSULE ORAL at 05:27

## 2024-05-30 RX ADMIN — ASPIRIN 81 MG: 81 TABLET, COATED ORAL at 05:27

## 2024-05-30 RX ADMIN — ATORVASTATIN CALCIUM 80 MG: 80 TABLET, FILM COATED ORAL at 20:02

## 2024-05-30 RX ADMIN — OXYCODONE HYDROCHLORIDE 2.5 MG: 5 TABLET ORAL at 01:34

## 2024-05-30 RX ADMIN — GABAPENTIN 400 MG: 400 CAPSULE ORAL at 17:47

## 2024-05-30 RX ADMIN — OXYCODONE HYDROCHLORIDE 5 MG: 5 TABLET ORAL at 21:18

## 2024-05-30 RX ADMIN — PREDNISONE 20 MG: 20 TABLET ORAL at 05:27

## 2024-05-30 RX ADMIN — BENZONATATE 100 MG: 100 CAPSULE ORAL at 22:48

## 2024-05-30 RX ADMIN — HUMAN ALBUMIN MICROSPHERES AND PERFLUTREN 3 ML: 10; .22 INJECTION, SOLUTION INTRAVENOUS at 11:27

## 2024-05-30 RX ADMIN — ACETAMINOPHEN 650 MG: 325 TABLET, FILM COATED ORAL at 05:41

## 2024-05-30 RX ADMIN — ACETAMINOPHEN 650 MG: 325 TABLET, FILM COATED ORAL at 16:50

## 2024-05-30 RX ADMIN — MOMETASONE FUROATE AND FORMOTEROL FUMARATE DIHYDRATE 1 PUFF: 200; 5 AEROSOL RESPIRATORY (INHALATION) at 05:27

## 2024-05-30 RX ADMIN — FUROSEMIDE 40 MG: 10 INJECTION INTRAMUSCULAR; INTRAVENOUS at 16:47

## 2024-05-30 RX ADMIN — INSULIN HUMAN 12 UNITS: 100 INJECTION, SOLUTION PARENTERAL at 15:45

## 2024-05-30 RX ADMIN — MOMETASONE FUROATE AND FORMOTEROL FUMARATE DIHYDRATE 1 PUFF: 200; 5 AEROSOL RESPIRATORY (INHALATION) at 17:46

## 2024-05-30 RX ADMIN — INSULIN HUMAN 10 UNITS: 100 INJECTION, SOLUTION PARENTERAL at 20:00

## 2024-05-30 ASSESSMENT — PAIN DESCRIPTION - PAIN TYPE
TYPE: ACUTE PAIN

## 2024-05-30 ASSESSMENT — FIBROSIS 4 INDEX: FIB4 SCORE: 2.17

## 2024-05-30 NOTE — CARE PLAN
The patient is Stable - Low risk of patient condition declining or worsening    Shift Goals  Clinical Goals: IS, wean oxygen, pain management, axillary dressing changes, vss, labs wdl  Patient Goals: rest, pain control, dressing changes.  Family Goals: JUDITH    Progress made toward(s) clinical / shift goals:        Problem: Pain - Standard  Goal: Alleviation of pain or a reduction in pain to the patient’s comfort goal  Outcome: Progressing     Problem: Knowledge Deficit - Standard  Goal: Patient and family/care givers will demonstrate understanding of plan of care, disease process/condition, diagnostic tests and medications  Outcome: Progressing       Patient is not progressing towards the following goals:

## 2024-05-30 NOTE — CARE PLAN
The patient is Stable - Low risk of patient condition declining or worsening    Shift Goals  Clinical Goals: pain control and increased mobility  Patient Goals: rest pain management  Family Goals: JUDITH    Progress made toward(s) clinical / shift goals:  pt down to 0.5 L of o2 on N/C    Patient is not progressing towards the following goals:None

## 2024-05-30 NOTE — WOUND TEAM
Wound care in to see patient. Per patient, wound dressing on L axilla already changed. This RN spoke with bedside LAN Ruth. Do not perform wound dressing change on 5/31 day shift, wound care will be for consultation.

## 2024-05-30 NOTE — PROGRESS NOTES
Dressing changed replaced with wet to dry packing style dressing, pt tolerated well states that his axilla is a little sore at this time.

## 2024-05-30 NOTE — PROGRESS NOTES
"[facility] -  PROGRESS NOTE               Author: Roderick Ramirez M.D. Date & Time: 5/30/2024  11:47 AM     HPI:  Patient is a 68 year old male with a PMHx of afib, CAD, renal transplant in 2010, PKD, HLD, HTN, and squamous cell carcinoma of the left axillary region, DM who presented to hospital for SOB.  Also has orthopnea and non-productive cough and lower extremity edema and chest pain.  Currently on mycophenolic acid and sirolimus and pred for his transplant.  Initial labs with creatinine 2.0, bicarb 17 , no new labs today.  UA with glucose but no protein or blood.  Looks like baseline creatinine about 1.4-1.5 but labile, patient states his normal creatinine normally ranges 1.9-2.0 recently though     DAILY NEPHROLOGY SUMMARY:  5/30 - Denies pain or SOB.  States urinating well with lasix.  No new labs yet.    REVIEW OF SYSTEMS:    10 point ROS reviewed and is as per HPI/daily summary or otherwise negative    PMH/PSH/SH/FH:   Reviewed and unchanged since admission note    CURRENT MEDICATIONS:   Reviewed from admission to present day    VS:  /81   Pulse 64   Temp 36.3 °C (97.3 °F) (Temporal)   Resp 16   Ht 1.956 m (6' 5\")   Wt 105 kg (231 lb 7.7 oz)   SpO2 93%   BMI 27.45 kg/m²     Physical Exam  Constitutional:       Appearance: He is ill-appearing.   HENT:      Head: Normocephalic.      Right Ear: External ear normal.      Left Ear: External ear normal.      Nose: Nose normal.   Eyes:      General: No scleral icterus.  Cardiovascular:      Rate and Rhythm: Normal rate.   Pulmonary:      Effort: Pulmonary effort is normal.      Breath sounds: Rales present.   Abdominal:      Palpations: Abdomen is soft.   Neurological:      Mental Status: He is alert.   Fluids:  In: 760 [P.O.:760]  Out: 3250     LABS:  Recent Labs     05/28/24 2002 05/29/24  0952   SODIUM 135 138   POTASSIUM 5.0 4.3   CHLORIDE 101 103   CO2 17* 22   GLUCOSE 516* 187*   BUN 49* 49*   CREATININE 2.08* 1.78* "   CALCIUM 9.1 9.1       IMAGING:   All imaging reviewed from admission to present day    IMPRESSION:  # SANKET    - Improved, appears baseline per patient around creat 1.9  # Renal transplant    - Continue with home IS medications  # Acute pulmonary edema    - Agree with diuresis  # Acidosis    - Resolving  # Immunosuppression medications  # Type 2 DM  # Atrial fibrillation        PLAN:  - Creatinine down-trending  - Agree with continued diuresis, consider change to PO lasix tomorrow  - Low salt diet  - Follow up heart studies  - Continue with home IS medications  - Dose all meds per eGFR   - Rest of management per primary team

## 2024-05-30 NOTE — PROGRESS NOTES
Monitor summary: Afib, -170, ID -, QRS 0.14, QT - with rare pvc's,coup,big,trig, per strip from monitor room

## 2024-05-30 NOTE — PROGRESS NOTES
Monitor summary: Afib , MI .-, QRS .13, QT .38 with occasional PVCs, couplets, triplets per strip from monitor room.

## 2024-05-30 NOTE — CARE PLAN
The patient is Stable - Low risk of patient condition declining or worsening    Shift Goals  Clinical Goals: Monitor I&Os, safety  Patient Goals: Rest, pain management  Family Goals: JUDITH    Progress made toward(s) clinical / shift goals:      Patient's intake and output monitored throughout the night. Patient understands why we are monitoring his intake and output. Patient understands to call this RN if they have worsening signs or symptoms of fluid volume overload.     Problem: Fall Risk  Goal: Patient will remain free from falls  Outcome: Progressing   Bed alarm in place. Pt calls for assistance and is provided appropriate assistive devices when needed. Treaded socks used when ambulating.       Patient is not progressing towards the following goals:

## 2024-05-30 NOTE — DISCHARGE PLANNING
TCN following. HTH/SCP chart reviewed. No new TCN needs identified. Please see prior TCN note from 5/29 for most recent discharge planning considerations if indicated. Anticipating dc to home with outpatient follow ups as indicated. Note 6 clicks mobility score remains 24 and per chart, pt remains ambulatory with no AD as well.     Completed:  Choice obtained: none indicated  SCP with [facility]. TCN has discussed possible outpatient transitional PCP follow up if indicated; however, pt reports he is not interested and will schedule on his own if indicated.

## 2024-05-31 ENCOUNTER — APPOINTMENT (OUTPATIENT)
Dept: RADIOLOGY | Facility: MEDICAL CENTER | Age: 69
End: 2024-05-31
Attending: INTERNAL MEDICINE
Payer: MEDICARE

## 2024-05-31 ENCOUNTER — PHARMACY VISIT (OUTPATIENT)
Dept: PHARMACY | Facility: MEDICAL CENTER | Age: 69
End: 2024-05-31
Payer: COMMERCIAL

## 2024-05-31 VITALS
DIASTOLIC BLOOD PRESSURE: 62 MMHG | SYSTOLIC BLOOD PRESSURE: 100 MMHG | HEART RATE: 64 BPM | OXYGEN SATURATION: 90 % | TEMPERATURE: 97.2 F | WEIGHT: 220.24 LBS | BODY MASS INDEX: 26 KG/M2 | HEIGHT: 77 IN | RESPIRATION RATE: 16 BRPM

## 2024-05-31 LAB
ANION GAP SERPL CALC-SCNC: 12 MMOL/L (ref 7–16)
BASOPHILS # BLD AUTO: 0.5 % (ref 0–1.8)
BASOPHILS # BLD: 0.04 K/UL (ref 0–0.12)
BUN SERPL-MCNC: 57 MG/DL (ref 8–22)
CALCIUM SERPL-MCNC: 9.1 MG/DL (ref 8.5–10.5)
CHLORIDE SERPL-SCNC: 101 MMOL/L (ref 96–112)
CO2 SERPL-SCNC: 22 MMOL/L (ref 20–33)
CREAT SERPL-MCNC: 1.72 MG/DL (ref 0.5–1.4)
EOSINOPHIL # BLD AUTO: 0.12 K/UL (ref 0–0.51)
EOSINOPHIL NFR BLD: 1.5 % (ref 0–6.9)
ERYTHROCYTE [DISTWIDTH] IN BLOOD BY AUTOMATED COUNT: 49.1 FL (ref 35.9–50)
GFR SERPLBLD CREATININE-BSD FMLA CKD-EPI: 43 ML/MIN/1.73 M 2
GLUCOSE BLD STRIP.AUTO-MCNC: 191 MG/DL (ref 65–99)
GLUCOSE BLD STRIP.AUTO-MCNC: 235 MG/DL (ref 65–99)
GLUCOSE BLD STRIP.AUTO-MCNC: 321 MG/DL (ref 65–99)
GLUCOSE SERPL-MCNC: 222 MG/DL (ref 65–99)
HCT VFR BLD AUTO: 39.1 % (ref 42–52)
HGB BLD-MCNC: 12.1 G/DL (ref 14–18)
IMM GRANULOCYTES # BLD AUTO: 0.2 K/UL (ref 0–0.11)
IMM GRANULOCYTES NFR BLD AUTO: 2.5 % (ref 0–0.9)
LYMPHOCYTES # BLD AUTO: 0.43 K/UL (ref 1–4.8)
LYMPHOCYTES NFR BLD: 5.5 % (ref 22–41)
MCH RBC QN AUTO: 26 PG (ref 27–33)
MCHC RBC AUTO-ENTMCNC: 30.9 G/DL (ref 32.3–36.5)
MCV RBC AUTO: 83.9 FL (ref 81.4–97.8)
MONOCYTES # BLD AUTO: 0.72 K/UL (ref 0–0.85)
MONOCYTES NFR BLD AUTO: 9.1 % (ref 0–13.4)
NEUTROPHILS # BLD AUTO: 6.36 K/UL (ref 1.82–7.42)
NEUTROPHILS NFR BLD: 80.9 % (ref 44–72)
NRBC # BLD AUTO: 0 K/UL
NRBC BLD-RTO: 0 /100 WBC (ref 0–0.2)
PLATELET # BLD AUTO: 98 K/UL (ref 164–446)
PLATELET BLD QL SMEAR: NORMAL
PLATELETS.RETICULATED NFR BLD AUTO: 9.3 % (ref 0.6–13.1)
PMV BLD AUTO: 11.4 FL (ref 9–12.9)
POTASSIUM SERPL-SCNC: 4.3 MMOL/L (ref 3.6–5.5)
RBC # BLD AUTO: 4.66 M/UL (ref 4.7–6.1)
SODIUM SERPL-SCNC: 135 MMOL/L (ref 135–145)
WBC # BLD AUTO: 7.9 K/UL (ref 4.8–10.8)

## 2024-05-31 PROCEDURE — A9270 NON-COVERED ITEM OR SERVICE: HCPCS | Performed by: STUDENT IN AN ORGANIZED HEALTH CARE EDUCATION/TRAINING PROGRAM

## 2024-05-31 PROCEDURE — 700111 HCHG RX REV CODE 636 W/ 250 OVERRIDE (IP): Performed by: HOSPITALIST

## 2024-05-31 PROCEDURE — 80048 BASIC METABOLIC PNL TOTAL CA: CPT

## 2024-05-31 PROCEDURE — RXMED WILLOW AMBULATORY MEDICATION CHARGE: Performed by: STUDENT IN AN ORGANIZED HEALTH CARE EDUCATION/TRAINING PROGRAM

## 2024-05-31 PROCEDURE — 82962 GLUCOSE BLOOD TEST: CPT

## 2024-05-31 PROCEDURE — A9270 NON-COVERED ITEM OR SERVICE: HCPCS

## 2024-05-31 PROCEDURE — 700102 HCHG RX REV CODE 250 W/ 637 OVERRIDE(OP): Performed by: HOSPITALIST

## 2024-05-31 PROCEDURE — 85055 RETICULATED PLATELET ASSAY: CPT

## 2024-05-31 PROCEDURE — 700102 HCHG RX REV CODE 250 W/ 637 OVERRIDE(OP): Performed by: STUDENT IN AN ORGANIZED HEALTH CARE EDUCATION/TRAINING PROGRAM

## 2024-05-31 PROCEDURE — A9270 NON-COVERED ITEM OR SERVICE: HCPCS | Performed by: HOSPITALIST

## 2024-05-31 PROCEDURE — 700102 HCHG RX REV CODE 250 W/ 637 OVERRIDE(OP)

## 2024-05-31 PROCEDURE — 99239 HOSP IP/OBS DSCHRG MGMT >30: CPT | Performed by: STUDENT IN AN ORGANIZED HEALTH CARE EDUCATION/TRAINING PROGRAM

## 2024-05-31 PROCEDURE — 85025 COMPLETE CBC W/AUTO DIFF WBC: CPT

## 2024-05-31 PROCEDURE — 36415 COLL VENOUS BLD VENIPUNCTURE: CPT

## 2024-05-31 RX ORDER — INSULIN GLARGINE 100 [IU]/ML
20 INJECTION, SOLUTION SUBCUTANEOUS EVERY EVENING
Qty: 15 ML | Refills: 3 | Status: ACTIVE | OUTPATIENT
Start: 2024-05-31

## 2024-05-31 RX ORDER — POTASSIUM CHLORIDE 750 MG/1
10 TABLET, EXTENDED RELEASE ORAL DAILY
Qty: 30 EACH | Refills: 3 | Status: ON HOLD | OUTPATIENT
Start: 2024-05-31 | End: 2024-06-20

## 2024-05-31 RX ORDER — FUROSEMIDE 40 MG/1
40 TABLET ORAL DAILY
Qty: 30 TABLET | Refills: 3 | Status: ON HOLD | OUTPATIENT
Start: 2024-05-31 | End: 2024-06-21

## 2024-05-31 RX ORDER — ENOXAPARIN SODIUM 100 MG/ML
40 INJECTION SUBCUTANEOUS DAILY
Status: DISCONTINUED | OUTPATIENT
Start: 2024-05-31 | End: 2024-05-31 | Stop reason: HOSPADM

## 2024-05-31 RX ADMIN — ACETAMINOPHEN 650 MG: 325 TABLET, FILM COATED ORAL at 12:36

## 2024-05-31 RX ADMIN — BENZONATATE 100 MG: 100 CAPSULE ORAL at 05:18

## 2024-05-31 RX ADMIN — MOMETASONE FUROATE AND FORMOTEROL FUMARATE DIHYDRATE 1 PUFF: 200; 5 AEROSOL RESPIRATORY (INHALATION) at 05:20

## 2024-05-31 RX ADMIN — INSULIN GLARGINE-YFGN 15 UNITS: 100 INJECTION, SOLUTION SUBCUTANEOUS at 05:27

## 2024-05-31 RX ADMIN — GABAPENTIN 400 MG: 400 CAPSULE ORAL at 05:19

## 2024-05-31 RX ADMIN — INSULIN HUMAN 4 UNITS: 100 INJECTION, SOLUTION PARENTERAL at 10:36

## 2024-05-31 RX ADMIN — ASPIRIN 81 MG: 81 TABLET, COATED ORAL at 05:19

## 2024-05-31 RX ADMIN — SIROLIMUS 6 MG: 0.5 TABLET ORAL at 05:18

## 2024-05-31 RX ADMIN — FUROSEMIDE 40 MG: 40 TABLET ORAL at 05:19

## 2024-05-31 RX ADMIN — PREDNISONE 20 MG: 20 TABLET ORAL at 05:19

## 2024-05-31 RX ADMIN — INSULIN HUMAN 10 UNITS: 100 INJECTION, SOLUTION PARENTERAL at 15:28

## 2024-05-31 RX ADMIN — METOPROLOL SUCCINATE 25 MG: 25 TABLET, EXTENDED RELEASE ORAL at 05:19

## 2024-05-31 ASSESSMENT — PAIN DESCRIPTION - PAIN TYPE: TYPE: ACUTE PAIN

## 2024-05-31 ASSESSMENT — FIBROSIS 4 INDEX: FIB4 SCORE: 2.17

## 2024-05-31 NOTE — PROGRESS NOTES
Hospital Medicine Daily Progress Note    Date of Service  5/30/2024    Chief Complaint  Jama Altman is a 68 y.o. male admitted 5/28/2024 with dyspnea.    Hospital Course  Jama Altman is a 68 y.o. male who presented 5/28/2024 with past medical history atrial fibrillation, status post watchman, coronary disease, renal transplant 2010, polycystic kidney disease, hyperlipidemia, hypertension, squamous cell carcinoma left axillary region, diabetes who presents to the hospital for shortness of breath for the past 1 week.  Patient does have shortness of breath on exertion.  It is associated with orthopnea, nonproductive cough, lower extremity edema and chest pain.  The chest pain is center of her chest, nonradiating but does get worse with exertion.  Patient denies any fevers, chills, nausea, vomiting, diarrhea.  Patient does have a left axillary wound and follows the wound care clinic.  Patient states that he takes care of her well and denies any drainage or redness in the area.  2 months ago he was transitioning from mycophenolic acid and tacrolimus to sirolimus and 20 mg of prednisone.     Chest x-ray interpreted by me found bilateral infiltrates, bilateral pulm edema, bilateral pleural effusion  EKG interpreted by me found atrial fibrillation, right bundle branch block, PVCs     COVID test is still pending    Interval Problem Update  5/29  Examined in his inpatient room, afebrile pulse in the 70s to 80s respiratory rate 16-20 systolic blood pressure from 100's-120's pulse ox 94 to 96% on 2 L nasal cannula.  Mild leukocytosis 11.1 anemia 11.7 thrombocytopenia 102 glucose 187 BUN 49 serum creatinine 1.78, patient reports recent baseline serum creatinine around 2.  Diuresing well, TTE pending.  Continue IV diuresis.    5/30  Examined in his inpatient room, afebrile HR 64-93 RR 16 SBP  SpO2 90-93% weaned down to 0.5 L NC.   Continue IV diuresis today. TTE unchanged from prior, mildly reduced LVEF 45%,  valves normal.  Transition from IV diuresis to PO diuresis tomorrow.   Discussed with Dr. Ramirez.  Added Lantus.    I have discussed this patient's plan of care and discharge plan at IDT rounds today with Case Management, Nursing, Nursing leadership, and other members of the IDT team.    Consultants/Specialty  nephrology    Code Status  Full Code    Disposition  Medically Cleared  I have placed the appropriate orders for post-discharge needs.    Review of Systems  ROS   Review of Systems   Constitutional:  Positive for malaise/fatigue. Negative for chills, diaphoresis and fever.   HENT:  Negative for congestion, ear discharge, ear pain, hearing loss, nosebleeds, sinus pain, sore throat and tinnitus.    Eyes:  Negative for blurred vision, double vision, photophobia and pain.   Respiratory:  Positive for cough and shortness of breath. Negative for hemoptysis, sputum production, wheezing and stridor.    Cardiovascular:  Positive for chest pain, palpitations, orthopnea, leg swelling and PND. Negative for claudication.   Gastrointestinal:  Negative for abdominal pain, blood in stool, constipation, diarrhea, heartburn, melena, nausea and vomiting.   Genitourinary:  Negative for dysuria, flank pain, frequency, hematuria and urgency.   Musculoskeletal:  Negative for back pain, falls, joint pain, myalgias and neck pain.   Skin:  Negative for itching and rash.   Neurological:  Negative for dizziness, tingling, tremors, weakness and headaches.   Endo/Heme/Allergies:  Negative for environmental allergies and polydipsia. Does not bruise/bleed easily.   Psychiatric/Behavioral:  Negative for depression, hallucinations, substance abuse and suicidal ideas.         Physical Exam  Temp:  [36.3 °C (97.3 °F)-36.5 °C (97.7 °F)] 36.4 °C (97.5 °F)  Pulse:  [] 93  Resp:  [16] 16  BP: ()/(63-81) 110/66  SpO2:  [90 %-93 %] 90 %    Physical Exam  Vitals and nursing note reviewed.   Constitutional:       General: He is not in acute  distress.     Appearance: He is not ill-appearing or toxic-appearing.   HENT:      Head: Normocephalic and atraumatic.      Nose: Nose normal. No rhinorrhea.      Mouth/Throat:      Mouth: Mucous membranes are moist.      Pharynx: Oropharynx is clear.   Eyes:      General: No scleral icterus.     Extraocular Movements: Extraocular movements intact.      Conjunctiva/sclera: Conjunctivae normal.   Cardiovascular:      Rate and Rhythm: Normal rate. Rhythm irregular.      Pulses: Normal pulses.   Pulmonary:      Effort: No respiratory distress.      Breath sounds: Rales present. No wheezing or rhonchi.   Abdominal:      General: There is no distension.      Palpations: Abdomen is soft.      Tenderness: There is no abdominal tenderness. There is no guarding or rebound.   Musculoskeletal:         General: Swelling present.      Cervical back: Normal range of motion and neck supple. No rigidity.      Right lower leg: Edema present.      Left lower leg: Edema present.   Skin:     General: Skin is warm and dry.      Capillary Refill: Capillary refill takes less than 2 seconds.      Findings: Lesion (left axilla) present.   Neurological:      General: No focal deficit present.      Mental Status: He is alert and oriented to person, place, and time.      Cranial Nerves: No cranial nerve deficit.   Psychiatric:         Mood and Affect: Mood normal.         Behavior: Behavior normal.         Thought Content: Thought content normal.         Judgment: Judgment normal.         Fluids    Intake/Output Summary (Last 24 hours) at 5/31/2024 0216  Last data filed at 5/30/2024 2055  Gross per 24 hour   Intake 500 ml   Output 1400 ml   Net -900 ml       Laboratory  Recent Labs     05/28/24 2002 05/29/24  0952   WBC 10.0 11.1*   RBC 4.75 4.41*   HEMOGLOBIN 12.5* 11.7*   HEMATOCRIT 40.7* 37.2*   MCV 85.7 84.4   MCH 26.3* 26.5*   MCHC 30.7* 31.5*   RDW 49.1 49.1   PLATELETCT 122* 102*   MPV 11.7 11.4     Recent Labs     05/28/24 2002  05/29/24  0952 05/30/24  1131   SODIUM 135 138 137   POTASSIUM 5.0 4.3 5.0   CHLORIDE 101 103 99   CO2 17* 22 22   GLUCOSE 516* 187* 270*   BUN 49* 49* 51*   CREATININE 2.08* 1.78* 1.70*   CALCIUM 9.1 9.1 9.3                   Imaging  EC-ECHOCARDIOGRAM COMPLETE W/ CONT   Final Result      DX-CHEST-PORTABLE (1 VIEW)   Final Result         1.  Hazy bilateral lower lobe infiltrates   2.  Trace bilateral pleural effusions   3.  Atherosclerosis           Assessment/Plan  * Acute pulmonary edema (HCC)  Assessment & Plan  I have ordered a cardiac echo  Monitor on telemetry  Trend troponins  Strict ins and outs and daily weights  Start IV Lasix 40 twice daily    Acute renal failure superimposed on stage 3a chronic kidney disease, unspecified acute renal failure type (HCC)- (present on admission)  Assessment & Plan  History of kidney transplant  Monitor BMP and assess response  Avoid IV contrast/nephrotoxins/NSAIDs  Dose adjust meds for decreased GFR      Long term (current) use of systemic steroids- (present on admission)  Assessment & Plan  Patient is not taking Bactrim prophylaxis    Immunosuppressed status (HCC)- (present on admission)  Assessment & Plan  History of cancer and immunosuppression from kidney transplant    Acute respiratory failure with hypoxia (HCC)- (present on admission)  Assessment & Plan  On 2 L of O2 above baseline    Type 2 diabetes mellitus (HCC)- (present on admission)  Assessment & Plan  Severely uncontrolled  Start on insulin sliding scale with serial Accu-Checks  Check hemoglobin A1c  Hypoglycemic protocol in place  Diabetic education    Patient is only on oral medications for his diabetes.  Since he has progressive renal failure from his underlying transplant.  I suggested to the patient that he transition to insulin since he has uncontrolled diabetes and the renal failure is causing his oral medications to be ineffective.  After insulin he will have much better control of his fluid  status.    History of atrial fibrillation- (present on admission)  Assessment & Plan  Rate controlled  History of Watchman device  Continue metoprolol    History of renal transplant- (present on admission)  Assessment & Plan  Continue sirolimus and prednisone         VTE prophylaxis:   SCDs/TEDs   enoxaparin ppx      I have performed a physical exam and reviewed and updated ROS and Plan today (5/30/2024). In review of yesterday's note (5/29/2024), there are no changes except as documented above.  Total time spent 55 minutes. I spent greater than 50% of the time for patient care, counseling, and coordination on this date, including unit/floor time, and face-to-face time with the patient as per interval events, my own review of patient's imaging and lab analysis and developing my assessment and plan above.

## 2024-05-31 NOTE — DISCHARGE PLANNING
"TCN following. HTH/SCP chart reviewed. No new TCN needs identified. Noted per chart patient ambulatory 300 feet x 2, with no AD or assist.  Please see prior TCN note from 5/29 for most recent discharge planning considerations if indicated.  Per TCN note on 5/29 patient reports, \"that though currently on supplemental 02, he has been told by providers that this will be weaned prior to dc to home.\"  Noted patient is only on 0.5L at this time.      Completed::  Choice obtained: none indicated  SCP with [facility] PCP. Patient denied TCN assistance with PCP f/u.   "

## 2024-05-31 NOTE — PROGRESS NOTES
Monitor summary: Afib, HR , CT -, QRS 0.13, QT - with (R&O)PVCs with bigems and couplets and HR touched up to 155 bpms per strip from monitor room

## 2024-05-31 NOTE — PROGRESS NOTES
Diabetes education: Met with pt this afternoon. Blood sugars remain high : 280 ( 7 units), 144 ( as pt got 7 units of regular insulin at hs), and 352  ( 12 units).  CDE sent text to Dr. Azul  asking about starting Glargine tonight as pt thinks he may discharge tomorrow. Pt is agreeable to take insulin at home and insulin doses, type, storage, shelf life and site rotation reviewed. Pt practiced with saline pens and practice device. Pt has given shots before ( not insulin).  Plan: Pt would benefit in starting long acting insulin as blood sugars are consistently over 270 and as high as 368. If to dc on insulin, please order Lantus solstar pens ( for 30 days), Humalog kwik pens ( for 30 days), with sliding scale written out and for ac only, pen needles, One touch verio flex meter, strips and lancets ( pt has an old meter and strips no longer covered by SCP). Please use diabetes supplies, f2 for correct meter and dx code. Please send to [facility] at discharge. Is pt to continue his oral diabetes medications? PCP follow up appointment scheduled for next Friday with the help from schedulers.  CDE can follow up on Tuesday if pt remains in the hospital.

## 2024-05-31 NOTE — WOUND TEAM
The patient has pleasantly declined assessment of left axilla and states that he prefers to change the dressing himself as he has been doing it home with no issue. Confirmed with Bedside RN that patient is able to do the dressing change. Wound RN completing wound consult at this time. Please re-consult for any wound questions or concerns

## 2024-05-31 NOTE — PROGRESS NOTES
Monitor summary: Afib, -170, TX -, QRS 0.06, QT 0.40 with (O) pvc's per strip from monitor room

## 2024-05-31 NOTE — CARE PLAN
The patient is Stable - Low risk of patient condition declining or worsening    Shift Goals  Clinical Goals: DC home  Patient Goals: DC home  Family Goals: maryellen    Progress made toward(s) clinical / shift goals:  pt ambulating well      Patient is not progressing towards the following goals:none

## 2024-05-31 NOTE — CARE PLAN
The patient is Stable - Low risk of patient condition declining or worsening    Shift Goals  Clinical Goals: Wound care, comfort  Patient Goals: rest,comfort  Family Goals: maryellen    Progress made toward(s) clinical / shift goals:  Patient is A0x4 , able to mobilized well. Noticeable increase in heart rate during effort. Oxygen support maintained. Wound care done . Pain controlled by prn medication . No acute change noted .     Patient is not progressing towards the following goals:

## 2024-05-31 NOTE — PROGRESS NOTES
"[facility] -  PROGRESS NOTE               Author: Roderick Ramirez M.D. Date & Time: 5/31/2024  11:16 AM     HPI:  Patient is a 68 year old male with a PMHx of afib, CAD, renal transplant in 2010, PKD, HLD, HTN, and squamous cell carcinoma of the left axillary region, DM who presented to hospital for SOB.  Also has orthopnea and non-productive cough and lower extremity edema and chest pain.  Currently on mycophenolic acid and sirolimus and pred for his transplant.  Initial labs with creatinine 2.0, bicarb 17 , no new labs today.  UA with glucose but no protein or blood.  Looks like baseline creatinine about 1.4-1.5 but labile, patient states his normal creatinine normally ranges 1.9-2.0 recently though     DAILY NEPHROLOGY SUMMARY:  5/30 - Denies pain or SOB.  States urinating well with lasix.  No new labs yet.  5/31 - Denies pain or SOB.  Cr stable 1.7.  walking    REVIEW OF SYSTEMS:    10 point ROS reviewed and is as per HPI/daily summary or otherwise negative    PMH/PSH/SH/FH:   Reviewed and unchanged since admission note    CURRENT MEDICATIONS:   Reviewed from admission to present day    VS:  /67   Pulse 75   Temp 36.6 °C (97.9 °F) (Temporal)   Resp 16   Ht 1.956 m (6' 5\")   Wt 99.9 kg (220 lb 3.8 oz)   SpO2 92%   BMI 26.12 kg/m²     Physical Exam  Constitutional:       Appearance: He is ill-appearing.   HENT:      Head: Normocephalic.      Right Ear: External ear normal.      Left Ear: External ear normal.      Nose: Nose normal.   Eyes:      General: No scleral icterus.  Cardiovascular:      Rate and Rhythm: Normal rate.   Pulmonary:      Effort: Pulmonary effort is normal.      Breath sounds: Rales present.   Abdominal:      Palpations: Abdomen is soft.   Neurological:      Mental Status: He is alert.   Fluids:  In: 1300 [P.O.:1100; Other:200]  Out: 2600     LABS:  Recent Labs     05/29/24  0952 05/30/24  1131 05/31/24  0625   SODIUM 138 137 135   POTASSIUM 4.3 5.0 4.3 "   CHLORIDE 103 99 101   CO2 22 22 22   GLUCOSE 187* 270* 222*   BUN 49* 51* 57*   CREATININE 1.78* 1.70* 1.72*   CALCIUM 9.1 9.3 9.1       IMAGING:   All imaging reviewed from admission to present day    IMPRESSION:  # SANKET    - Cr back to baseline  # Renal transplant    - Continue with home IS medications  # Acute pulmonary edema    - Agree with diuresis  # Acidosis    - Resolving  # Immunosuppression medications  # Type 2 DM  # Atrial fibrillation        PLAN:  - Agree with PO lasix  - Low salt diet  - Continue with home IS medications  - Dose all meds per eGFR   - Rest of management per primary team

## 2024-05-31 NOTE — HOSPITAL COURSE
Jama Altman is a 68 y.o. male who presented 5/28/2024 with past medical history atrial fibrillation, status post watchman, coronary disease, renal transplant 2010, polycystic kidney disease, hyperlipidemia, hypertension, squamous cell carcinoma left axillary region, diabetes who presents to the hospital for shortness of breath for the past 1 week.  Patient does have shortness of breath on exertion.  It is associated with orthopnea, nonproductive cough, lower extremity edema and chest pain.  The chest pain is center of her chest, nonradiating but does get worse with exertion.  Patient denies any fevers, chills, nausea, vomiting, diarrhea.  Patient does have a left axillary wound and follows the wound care clinic.  Patient states that he takes care of her well and denies any drainage or redness in the area.  2 months ago he was transitioning from mycophenolic acid and tacrolimus to sirolimus and 20 mg of prednisone.     Chest x-ray interpreted by me found bilateral infiltrates, bilateral pulm edema, bilateral pleural effusion  EKG interpreted by me found atrial fibrillation, right bundle branch block, PVCs     COVID test is still pending

## 2024-05-31 NOTE — PROGRESS NOTES
Hospital Medicine Daily Progress Note    Date of Service  5/29/2024    Chief Complaint  Jama Altman is a 68 y.o. male admitted 5/28/2024 with dyspnea.    Hospital Course  Jama Altman is a 68 y.o. male who presented 5/28/2024 with past medical history atrial fibrillation, status post watchman, coronary disease, renal transplant 2010, polycystic kidney disease, hyperlipidemia, hypertension, squamous cell carcinoma left axillary region, diabetes who presents to the hospital for shortness of breath for the past 1 week.  Patient does have shortness of breath on exertion.  It is associated with orthopnea, nonproductive cough, lower extremity edema and chest pain.  The chest pain is center of her chest, nonradiating but does get worse with exertion.  Patient denies any fevers, chills, nausea, vomiting, diarrhea.  Patient does have a left axillary wound and follows the wound care clinic.  Patient states that he takes care of her well and denies any drainage or redness in the area.  2 months ago he was transitioning from mycophenolic acid and tacrolimus to sirolimus and 20 mg of prednisone.     Chest x-ray interpreted by me found bilateral infiltrates, bilateral pulm edema, bilateral pleural effusion  EKG interpreted by me found atrial fibrillation, right bundle branch block, PVCs     COVID test is still pending    Interval Problem Update  5/29  Examined in his inpatient room, afebrile pulse in the 70s to 80s respiratory rate 16-20 systolic blood pressure from 100's-120's pulse ox 94 to 96% on 2 L nasal cannula.  Mild leukocytosis 11.1 anemia 11.7 thrombocytopenia 102 glucose 187 BUN 49 serum creatinine 1.78, patient reports recent baseline serum creatinine around 2.  Diuresing well, TTE pending.  Continue IV diuresis.    I have discussed this patient's plan of care and discharge plan at IDT rounds today with Case Management, Nursing, Nursing leadership, and other members of the IDT  team.    Consultants/Specialty  nephrology    Code Status  Full Code    Disposition  <MEDICALLYCLEARED>  I have placed the appropriate orders for post-discharge needs.    Review of Systems  ROS   Review of Systems   Constitutional:  Positive for malaise/fatigue. Negative for chills, diaphoresis and fever.   HENT:  Negative for congestion, ear discharge, ear pain, hearing loss, nosebleeds, sinus pain, sore throat and tinnitus.    Eyes:  Negative for blurred vision, double vision, photophobia and pain.   Respiratory:  Positive for cough and shortness of breath. Negative for hemoptysis, sputum production, wheezing and stridor.    Cardiovascular:  Positive for chest pain, palpitations, orthopnea, leg swelling and PND. Negative for claudication.   Gastrointestinal:  Negative for abdominal pain, blood in stool, constipation, diarrhea, heartburn, melena, nausea and vomiting.   Genitourinary:  Negative for dysuria, flank pain, frequency, hematuria and urgency.   Musculoskeletal:  Negative for back pain, falls, joint pain, myalgias and neck pain.   Skin:  Negative for itching and rash.   Neurological:  Negative for dizziness, tingling, tremors, weakness and headaches.   Endo/Heme/Allergies:  Negative for environmental allergies and polydipsia. Does not bruise/bleed easily.   Psychiatric/Behavioral:  Negative for depression, hallucinations, substance abuse and suicidal ideas.         Physical Exam  Temp:  [36.3 °C (97.3 °F)-36.5 °C (97.7 °F)] 36.5 °C (97.7 °F)  Pulse:  [] 87  Resp:  [16-18] 16  BP: ()/(63-81) 106/66  SpO2:  [90 %-93 %] 90 %    Physical Exam  Vitals and nursing note reviewed.   Constitutional:       General: He is not in acute distress.     Appearance: He is not toxic-appearing.   HENT:      Head: Normocephalic and atraumatic.      Nose: Nose normal. No rhinorrhea.      Mouth/Throat:      Mouth: Mucous membranes are moist.      Pharynx: Oropharynx is clear.   Eyes:      General: No scleral  icterus.     Extraocular Movements: Extraocular movements intact.      Conjunctiva/sclera: Conjunctivae normal.   Cardiovascular:      Rate and Rhythm: Normal rate. Rhythm irregular.      Pulses: Normal pulses.   Pulmonary:      Effort: No respiratory distress.      Breath sounds: Rales present. No wheezing or rhonchi.   Abdominal:      General: There is no distension.      Palpations: Abdomen is soft.      Tenderness: There is no abdominal tenderness. There is no guarding or rebound.   Musculoskeletal:         General: Swelling present.      Cervical back: Normal range of motion and neck supple. No rigidity.      Right lower leg: Edema present.      Left lower leg: Edema present.   Skin:     General: Skin is warm and dry.      Capillary Refill: Capillary refill takes less than 2 seconds.      Findings: Lesion (left axilla) present.   Neurological:      General: No focal deficit present.      Mental Status: He is alert and oriented to person, place, and time.      Cranial Nerves: No cranial nerve deficit.   Psychiatric:         Mood and Affect: Mood normal.         Behavior: Behavior normal.         Thought Content: Thought content normal.         Judgment: Judgment normal.         Fluids    Intake/Output Summary (Last 24 hours) at 5/30/2024 2337  Last data filed at 5/30/2024 2055  Gross per 24 hour   Intake 500 ml   Output 1400 ml   Net -900 ml       Laboratory  Recent Labs     05/28/24 2002 05/29/24  0952   WBC 10.0 11.1*   RBC 4.75 4.41*   HEMOGLOBIN 12.5* 11.7*   HEMATOCRIT 40.7* 37.2*   MCV 85.7 84.4   MCH 26.3* 26.5*   MCHC 30.7* 31.5*   RDW 49.1 49.1   PLATELETCT 122* 102*   MPV 11.7 11.4     Recent Labs     05/28/24 2002 05/29/24  0952 05/30/24  1131   SODIUM 135 138 137   POTASSIUM 5.0 4.3 5.0   CHLORIDE 101 103 99   CO2 17* 22 22   GLUCOSE 516* 187* 270*   BUN 49* 49* 51*   CREATININE 2.08* 1.78* 1.70*   CALCIUM 9.1 9.1 9.3                   Imaging  EC-ECHOCARDIOGRAM COMPLETE W/ CONT   Final Result       DX-CHEST-PORTABLE (1 VIEW)   Final Result         1.  Hazy bilateral lower lobe infiltrates   2.  Trace bilateral pleural effusions   3.  Atherosclerosis           Assessment/Plan  * Acute pulmonary edema (HCC)  Assessment & Plan  I have ordered a cardiac echo  Monitor on telemetry  Trend troponins  Strict ins and outs and daily weights  Start IV Lasix 40 twice daily    Acute renal failure superimposed on stage 3a chronic kidney disease, unspecified acute renal failure type (HCC)- (present on admission)  Assessment & Plan  History of kidney transplant  Monitor BMP and assess response  Avoid IV contrast/nephrotoxins/NSAIDs  Dose adjust meds for decreased GFR      Long term (current) use of systemic steroids- (present on admission)  Assessment & Plan  Patient is not taking Bactrim prophylaxis    Immunosuppressed status (HCC)- (present on admission)  Assessment & Plan  History of cancer and immunosuppression from kidney transplant    Acute respiratory failure with hypoxia (HCC)- (present on admission)  Assessment & Plan  On 2 L of O2 above baseline    Type 2 diabetes mellitus (HCC)- (present on admission)  Assessment & Plan  Severely uncontrolled  Start on insulin sliding scale with serial Accu-Checks  Check hemoglobin A1c  Hypoglycemic protocol in place  Diabetic education    Patient is only on oral medications for his diabetes.  Since he has progressive renal failure from his underlying transplant.  I suggested to the patient that he transition to insulin since he has uncontrolled diabetes and the renal failure is causing his oral medications to be ineffective.  After insulin he will have much better control of his fluid status.    History of atrial fibrillation- (present on admission)  Assessment & Plan  Rate controlled  History of Watchman device  Continue metoprolol    History of renal transplant- (present on admission)  Assessment & Plan  Continue sirolimus and prednisone         VTE prophylaxis:    SCDs/TEDs      I have performed a physical exam and reviewed and updated ROS and Plan today (5/29/2024). In review of yesterday's note (5/28/2024), there are no changes except as documented above.  Total time spent 52 minutes. I spent greater than 50% of the time for patient care, counseling, and coordination on this date, including unit/floor time, and face-to-face time with the patient as per interval events, my own review of patient's imaging and lab analysis and developing my assessment and plan above.

## 2024-06-03 ENCOUNTER — PATIENT OUTREACH (OUTPATIENT)
Dept: MEDICAL GROUP | Facility: PHYSICIAN GROUP | Age: 69
End: 2024-06-03
Payer: MEDICARE

## 2024-06-03 NOTE — PROGRESS NOTES
Transitional Care Management  TCM Outreach Date and Time: Filed (6/3/2024  9:46 AM)    Discharge Questions  Actual Discharge Date: 07/31/24  Now that you are home, how are you feeling?: Good  Did you receive any new prescriptions?: Yes  Were you able to get them filled?: Yes  Meds to Bed or Pharmacy filled?: Meds to Bed  Do you have any questions about your current medications or new medications (Review Med Rec)?: No  Did you have any durable medical equipment ordered?: No  Do you have a follow up appointment scheduled with your PCP?: Yes  Appointment Date: 06/07/24  Appointment Time: 1620  Any issues or paperwork you wish to discuss with your PCP?: No  Are you (patient) able to get to the appointment?: Yes  If Home Health was ordered, have they contacted you (Patient): Yes  Did you have enough support after your last discharge?: Yes  Does this patient qualify for the CCM program?: Yes    Transitional Care  Number of attempts made to contact patient: 1  Current or previous attempts completed within two business days of discharge? : Yes  Provided education regarding treatment plan, medications, self-management, ADLs?: No  Has patient completed an Advanced Directive?: Yes  Is the patient's advanced directive on file?: No (If No, advise patient to bring a copy to appointment.)  Has the Care Manager's phone number provided?: No  Is there anything else I can help you with?: No    Discharge Summary  Chief Complaint: shortness of breath  Admitting Diagnosis: Acute pulmonary edema  Discharge Diagnosis: Acute pulmonary edema

## 2024-06-03 NOTE — DOCUMENTATION QUERY
"                                                                         [facility]                                                                       Query Response Note      PATIENT:               CIARA FORRESTER  ACCT #:                  [id number]  MRN:                     [id number]  :                      1955  ADMIT DATE:       2024 7:31 PM  DISCH DATE:        2024 6:01 PM  RESPONDING  PROVIDER #:        [id number]           QUERY TEXT:    Pulmonary Edema is documented in the Medical Record.  Can the type and acuity of pulmonary edema be further specified?      Note: If you agree with a diagnosis listed above, please remember to include it in your concurrent daily documentation and onto the Discharge Summary.        The patient's Clinical Indicators include:  68 year old male admitted with SOB.     Cannone \"Continue IV diuresis today. TTE unchanged from prior, mildly reduced LVEF 45%, valves normal.\"     James  \"Acute pulmonary edema.   - Agree with diuresis\"     Enrrique \"acute pulmonary edema\"  \"I have ordered a cardiac echo\"  \"Chest x-ray interpreted by me found bilateral infiltrates, bilateral pulm edema, bilateral pleural effusion\"    ECHO \"Moderate concentric left ventricular hypertrophy. Reduced left ventricular systolic function. The left ventricular ejection  fraction is visually estimated to be 45%.Wall motion is difficult to assess with the limitations of image quality, even with the use of echo contrast. Diastolic function is difficult to assess with arrhythmia.\"    Risk factors: DM, kidney transplant, orthopnea, lower extremity  edema   Treatment: labs, CXR, ECHO, Lasix, O2, low salt diet    If you have any questions, please contact:  Linda Myles RN CDI [facility]  [email or web address]  Linda Myles [address]  Options provided:   -- Acute pulmonary edema due to acute systolic heart failure, treated with Lasix   -- Acute pulmonary edema- non cardiogenic   -- " Other explanation, (please specify other explanation)      Query created by: Linda Myles on 5/30/2024 2:11 PM    RESPONSE TEXT:    Acute pulmonary edema due to acute systolic heart failure, treated with Lasix          Electronically signed by:  MELIDA SAWANT MD 6/2/2024 6:31 PM

## 2024-06-04 ENCOUNTER — TELEPHONE (OUTPATIENT)
Dept: HEALTH INFORMATION MANAGEMENT | Facility: OTHER | Age: 69
End: 2024-06-04
Payer: MEDICARE

## 2024-06-05 ENCOUNTER — HOSPITAL ENCOUNTER (OUTPATIENT)
Dept: LAB | Facility: MEDICAL CENTER | Age: 69
End: 2024-06-05
Attending: INTERNAL MEDICINE
Payer: MEDICARE

## 2024-06-05 LAB
ALBUMIN SERPL BCP-MCNC: 3.4 G/DL (ref 3.2–4.9)
ALBUMIN/GLOB SERPL: 1.4 G/DL
ALP SERPL-CCNC: 70 U/L (ref 30–99)
ALT SERPL-CCNC: 17 U/L (ref 2–50)
ANION GAP SERPL CALC-SCNC: 14 MMOL/L (ref 7–16)
ANISOCYTOSIS BLD QL SMEAR: ABNORMAL
APPEARANCE UR: CLEAR
AST SERPL-CCNC: 16 U/L (ref 12–45)
BASOPHILS # BLD AUTO: 0.1 % (ref 0–1.8)
BASOPHILS # BLD: 0.01 K/UL (ref 0–0.12)
BILIRUB SERPL-MCNC: 0.4 MG/DL (ref 0.1–1.5)
BILIRUB UR QL STRIP.AUTO: NEGATIVE
BUN SERPL-MCNC: 79 MG/DL (ref 8–22)
BURR CELLS BLD QL SMEAR: NORMAL
CALCIUM ALBUM COR SERPL-MCNC: 9.6 MG/DL (ref 8.5–10.5)
CALCIUM SERPL-MCNC: 9.1 MG/DL (ref 8.5–10.5)
CHLORIDE SERPL-SCNC: 102 MMOL/L (ref 96–112)
CO2 SERPL-SCNC: 23 MMOL/L (ref 20–33)
COLOR UR: YELLOW
COMMENT 1642: NORMAL
CREAT SERPL-MCNC: 2.89 MG/DL (ref 0.5–1.4)
CREAT UR-MCNC: 150.74 MG/DL
EOSINOPHIL # BLD AUTO: 0.04 K/UL (ref 0–0.51)
EOSINOPHIL NFR BLD: 0.6 % (ref 0–6.9)
ERYTHROCYTE [DISTWIDTH] IN BLOOD BY AUTOMATED COUNT: 52 FL (ref 35.9–50)
GFR SERPLBLD CREATININE-BSD FMLA CKD-EPI: 23 ML/MIN/1.73 M 2
GLOBULIN SER CALC-MCNC: 2.5 G/DL (ref 1.9–3.5)
GLUCOSE SERPL-MCNC: 111 MG/DL (ref 65–99)
GLUCOSE UR STRIP.AUTO-MCNC: NEGATIVE MG/DL
HCT VFR BLD AUTO: 40.9 % (ref 42–52)
HGB BLD-MCNC: 12 G/DL (ref 14–18)
IMM GRANULOCYTES # BLD AUTO: 0.05 K/UL (ref 0–0.11)
IMM GRANULOCYTES NFR BLD AUTO: 0.7 % (ref 0–0.9)
KETONES UR STRIP.AUTO-MCNC: NEGATIVE MG/DL
LEUKOCYTE ESTERASE UR QL STRIP.AUTO: NEGATIVE
LYMPHOCYTES # BLD AUTO: 0.36 K/UL (ref 1–4.8)
LYMPHOCYTES NFR BLD: 5.4 % (ref 22–41)
MAGNESIUM SERPL-MCNC: 1.9 MG/DL (ref 1.5–2.5)
MCH RBC QN AUTO: 25.5 PG (ref 27–33)
MCHC RBC AUTO-ENTMCNC: 29.3 G/DL (ref 32.3–36.5)
MCV RBC AUTO: 87 FL (ref 81.4–97.8)
MICRO URNS: NORMAL
MICROCYTES BLD QL SMEAR: ABNORMAL
MONOCYTES # BLD AUTO: 0.67 K/UL (ref 0–0.85)
MONOCYTES NFR BLD AUTO: 10 % (ref 0–13.4)
MORPHOLOGY BLD-IMP: NORMAL
NEUTROPHILS # BLD AUTO: 5.57 K/UL (ref 1.82–7.42)
NEUTROPHILS NFR BLD: 83.2 % (ref 44–72)
NITRITE UR QL STRIP.AUTO: NEGATIVE
NRBC # BLD AUTO: 0 K/UL
NRBC BLD-RTO: 0 /100 WBC (ref 0–0.2)
OVALOCYTES BLD QL SMEAR: NORMAL
PH UR STRIP.AUTO: 5.5 [PH] (ref 5–8)
PHOSPHATE SERPL-MCNC: 3.3 MG/DL (ref 2.5–4.5)
PLATELET # BLD AUTO: 77 K/UL (ref 164–446)
PLATELET BLD QL SMEAR: NORMAL
PLATELETS.RETICULATED NFR BLD AUTO: 15.1 % (ref 0.6–13.1)
PMV BLD AUTO: 13.4 FL (ref 9–12.9)
POIKILOCYTOSIS BLD QL SMEAR: NORMAL
POTASSIUM SERPL-SCNC: 4.9 MMOL/L (ref 3.6–5.5)
PROT SERPL-MCNC: 5.9 G/DL (ref 6–8.2)
PROT UR QL STRIP: NEGATIVE MG/DL
PROT UR-MCNC: 7 MG/DL (ref 0–15)
PROT/CREAT UR: 46 MG/G (ref 15–68)
RBC # BLD AUTO: 4.7 M/UL (ref 4.7–6.1)
RBC BLD AUTO: PRESENT
RBC UR QL AUTO: NEGATIVE
SODIUM SERPL-SCNC: 139 MMOL/L (ref 135–145)
SP GR UR STRIP.AUTO: 1.02
URATE SERPL-MCNC: 11.9 MG/DL (ref 2.5–8.3)
UROBILINOGEN UR STRIP.AUTO-MCNC: 0.2 MG/DL
WBC # BLD AUTO: 6.7 K/UL (ref 4.8–10.8)

## 2024-06-05 PROCEDURE — 85055 RETICULATED PLATELET ASSAY: CPT

## 2024-06-05 PROCEDURE — 84550 ASSAY OF BLOOD/URIC ACID: CPT

## 2024-06-05 PROCEDURE — 82570 ASSAY OF URINE CREATININE: CPT

## 2024-06-05 PROCEDURE — 83735 ASSAY OF MAGNESIUM: CPT

## 2024-06-05 PROCEDURE — 85025 COMPLETE CBC W/AUTO DIFF WBC: CPT

## 2024-06-05 PROCEDURE — 36415 COLL VENOUS BLD VENIPUNCTURE: CPT

## 2024-06-05 PROCEDURE — 81003 URINALYSIS AUTO W/O SCOPE: CPT

## 2024-06-05 PROCEDURE — 80053 COMPREHEN METABOLIC PANEL: CPT

## 2024-06-05 PROCEDURE — 84100 ASSAY OF PHOSPHORUS: CPT

## 2024-06-05 PROCEDURE — 84156 ASSAY OF PROTEIN URINE: CPT

## 2024-06-07 ENCOUNTER — OFFICE VISIT (OUTPATIENT)
Dept: MEDICAL GROUP | Facility: PHYSICIAN GROUP | Age: 69
End: 2024-06-07
Payer: MEDICARE

## 2024-06-07 VITALS
WEIGHT: 229.1 LBS | BODY MASS INDEX: 27.05 KG/M2 | DIASTOLIC BLOOD PRESSURE: 62 MMHG | SYSTOLIC BLOOD PRESSURE: 110 MMHG | OXYGEN SATURATION: 92 % | RESPIRATION RATE: 12 BRPM | HEART RATE: 64 BPM | HEIGHT: 77 IN | TEMPERATURE: 97.5 F

## 2024-06-07 DIAGNOSIS — Z09 HOSPITAL DISCHARGE FOLLOW-UP: Primary | ICD-10-CM

## 2024-06-07 ASSESSMENT — FIBROSIS 4 INDEX: FIB4 SCORE: 3.38

## 2024-06-07 NOTE — PROGRESS NOTES
Subjective:     Jama Altman is a 68 y.o. male who presents for Hospital Follow-up.    Transitional Care Management  TCM Outreach Date and Time: Filed (6/3/2024  9:46 AM)    Discharge Questions  Actual Discharge Date: 07/31/24  Now that you are home, how are you feeling?: Good  Did you receive any new prescriptions?: Yes  Were you able to get them filled?: Yes  Meds to Bed or Pharmacy filled?: Meds to Bed  Do you have any questions about your current medications or new medications (Review Med Rec)?: No  Did you have any durable medical equipment ordered?: No  Do you have a follow up appointment scheduled with your PCP?: Yes  Appointment Date: 06/07/24  Appointment Time: 1620  Any issues or paperwork you wish to discuss with your PCP?: No  Are you (patient) able to get to the appointment?: Yes  If Home Health was ordered, have they contacted you (Patient): Yes  Did you have enough support after your last discharge?: Yes  Does this patient qualify for the CCM program?: Yes    Transitional Care  Number of attempts made to contact patient: 1  Current or previous attempts completed within two business days of discharge? : Yes  Provided education regarding treatment plan, medications, self-management, ADLs?: No  Has patient completed an Advanced Directive?: Yes  Is the patient's advanced directive on file?: No (If No, advise patient to bring a copy to appointment.)  Has the Care Manager's phone number provided?: No  Is there anything else I can help you with?: No    Discharge Summary  Chief Complaint: shortness of breath  Admitting Diagnosis: Acute pulmonary edema  Discharge Diagnosis: Acute pulmonary edema        HPI:   Recently hospitalized for :  Hospital discharge follow-up  Patient was released from the hospital on May 31.  It appears he was hospitalized for pulmonary edema along with renal failure and hyperglycemia.  Adjustments were made on his medications.  He has pending appointment with cardiology on Monday  and nephrology on Tuesday.  He states he still feeling bad and he had a lot of medications changed that he has questions about.  He is unable to sleep laying down because of shortness of breath so he is sleeping in his recliner.  His legs are dependent and he still having swelling there.  He is on Lasix 40 mg a day.  Also in the hospital his sugar was up at 500 so he was started on insulin to 20 units.  He states he was not given instructions on what to do with the insulin dose.  There is also no discharge summary available in the chart.      Current medicines (including reconciliation performed today)  Current Outpatient Medications   Medication Sig Dispense Refill    furosemide (LASIX) 40 MG Tab Take 1 Tablet by mouth every day. 30 Tablet 3    potassium chloride SA (K-DUR) 10 MEQ Tab CR Take 1 Tablet by mouth every day. 30 Each 3    insulin glargine 100 UNIT/ML Solution Pen-injector injection Inject 20 Units under the skin every evening. 15 mL 3    metFORMIN (GLUCOPHAGE) 500 MG Tab Take 1 Tablet by mouth 2 times a day with meals. 60 Tablet 3    Insulin Pen Needle 32 G x 4 mm Use one pen needle in pen device to inject insulin once daily. 100 Each 0    predniSONE (DELTASONE) 20 MG Tab Take 20 mg by mouth every day.      acarbose (PRECOSE) 50 MG Tab TAKE 1 TABLET BY MOUTH THREE TIMES DAILY WITH MEALS 270 Tablet 3    TRADJENTA 5 MG Tab tablet TAKE 1 TABLET BY MOUTH EVERY  Tablet 3    benzonatate (TESSALON) 200 MG capsule Take 1 Capsule by mouth 3 times a day as needed for Cough. 30 Capsule 1    metroNIDAZOLE (FLAGYL) 500 MG Tab Crush 1-2 tablets and apply dirrectly into wound bed daily as needed for odor control. 30 Tablet 3    Sirolimus 2 MG Tab Take 6 mg by mouth every day. 2 mg x 3 tablets = 6 mg      omeprazole (PRILOSEC) 20 MG delayed-release capsule Take 1 Capsule by mouth every day. Indications: Heartburn 90 Capsule 3    glyBURIDE (DIABETA) 5 MG Tab TAKE 2 TABLETS BY MOUTH TWICE DAILY WITH MEALS 400  "Tablet 3    gabapentin (NEURONTIN) 400 MG Cap Take 1 Capsule by mouth 2 times a day. 90 Capsule 1    metoprolol SR (TOPROL XL) 25 MG TABLET SR 24 HR Take 1 Tablet by mouth every day. May take additional one tab daily for heart rate sustaining >110 BPM. 100 Tablet 3    atorvastatin (LIPITOR) 80 MG tablet Take 1 Tablet by mouth at bedtime. 100 Tablet 3    aspirin 81 MG EC tablet Take 1 Tablet by mouth every day. 100 Tablet 1    pioglitazone (ACTOS) 45 MG Tab TAKE ONE TABLET BY MOUTH ONCE DAILY 100 Tablet 3     No current facility-administered medications for this visit.       Allergies:   Doxycycline    Social History     Tobacco Use    Smoking status: Never     Passive exposure: Never    Smokeless tobacco: Never   Vaping Use    Vaping status: Never Used   Substance Use Topics    Alcohol use: No    Drug use: No       ROS:  Shortness of breath and mild upset stomach.  Also fatigue.    Objective:     Vitals:    06/07/24 1600   BP: 110/62   BP Location: Right arm   Patient Position: Sitting   BP Cuff Size: Adult   Pulse: 64   Resp: 12   Temp: 36.4 °C (97.5 °F)   TempSrc: Temporal   SpO2: 92%   Weight: 104 kg (229 lb 1.6 oz)   Height: 1.956 m (6' 5\")     Body mass index is 27.17 kg/m².    Physical Exam:  Vital signs are stable.  Afebrile.  General: Patient appears in no acute distress.  Alert and cooperative.  Lungs: Clear to auscultation.  No rales heard.  Heart: Regular rate and rhythm without murmurs or gallop.  Extremities: No clubbing or cyanosis.  He does have +1 to almost +2 pitting edema in the lower extremities.    Assessment and Plan:   1. Hospital discharge follow-up  This is a new issue.  Patient was wanting to get off some of his medications as he is not sure what the side effects are bothering him or is just too many medicines.  Will go ahead and stop all 4 of his oral diabetic pills.  Will continue on insulin and I gave him a written scale on how to adjust the dose daily  to keep the fasting blood sugar " ideally between 80 and 140.  He will discuss with the cardiology and nephrologist next week about whether he needs to change his diuretic or not.  He was told to keep in close contact with me as all these changes are done and to see how he feels.  Also if he has any questions with the dosing of his insulin we can go over that again.    - Chart and discharge summary were reviewed.   - Hospitalization and results reviewed with patient.   - Medications reviewed including instructions regarding high risk medications, dosing and side effects.  - Recommended Services: No services needed at this time  - Advance directive/POLST on file?  Yes    Follow-up:Return in about 4 weeks (around 7/5/2024) for Long.    Face-to-face transitional care management services with HIGH (today's visit is within days post discharge & LACE+ score 59+) medical decision complexity were provided.

## 2024-06-07 NOTE — ASSESSMENT & PLAN NOTE
Patient was released from the hospital on May 31.  It appears he was hospitalized for pulmonary edema along with renal failure and hyperglycemia.  Adjustments were made on his medications.  He has pending appointment with cardiology on Monday and nephrology on Tuesday.  He states he still feeling bad and he had a lot of medications changed that he has questions about.  He is unable to sleep laying down because of shortness of breath so he is sleeping in his recliner.  His legs are dependent and he still having swelling there.  He is on Lasix 40 mg a day.  Also in the hospital his sugar was up at 500 so he was started on insulin to 20 units.  He states he was not given instructions on what to do with the insulin dose.  There is also no discharge summary available in the chart.

## 2024-06-10 ENCOUNTER — TELEPHONE (OUTPATIENT)
Dept: CARDIOLOGY | Facility: MEDICAL CENTER | Age: 69
End: 2024-06-10
Payer: MEDICARE

## 2024-06-10 ENCOUNTER — OFFICE VISIT (OUTPATIENT)
Dept: CARDIOLOGY | Facility: MEDICAL CENTER | Age: 69
End: 2024-06-10
Attending: INTERNAL MEDICINE
Payer: MEDICARE

## 2024-06-10 VITALS
HEIGHT: 77 IN | OXYGEN SATURATION: 93 % | SYSTOLIC BLOOD PRESSURE: 102 MMHG | RESPIRATION RATE: 16 BRPM | BODY MASS INDEX: 27.17 KG/M2 | HEART RATE: 66 BPM | DIASTOLIC BLOOD PRESSURE: 62 MMHG

## 2024-06-10 DIAGNOSIS — I48.0 PAF (PAROXYSMAL ATRIAL FIBRILLATION) (HCC): ICD-10-CM

## 2024-06-10 DIAGNOSIS — Z95.5 S/P INSERTION OF NON-DRUG ELUTING CORONARY ARTERY STENT: ICD-10-CM

## 2024-06-10 DIAGNOSIS — I48.19 PERSISTENT ATRIAL FIBRILLATION (HCC): ICD-10-CM

## 2024-06-10 DIAGNOSIS — D68.69 HYPERCOAGULABLE STATE DUE TO PAROXYSMAL ATRIAL FIBRILLATION (HCC): ICD-10-CM

## 2024-06-10 DIAGNOSIS — Z95.818 PRESENCE OF WATCHMAN LEFT ATRIAL APPENDAGE CLOSURE DEVICE: ICD-10-CM

## 2024-06-10 DIAGNOSIS — I48.0 HYPERCOAGULABLE STATE DUE TO PAROXYSMAL ATRIAL FIBRILLATION (HCC): ICD-10-CM

## 2024-06-10 LAB — EKG IMPRESSION: NORMAL

## 2024-06-10 PROCEDURE — 99214 OFFICE O/P EST MOD 30 MIN: CPT | Mod: 25 | Performed by: INTERNAL MEDICINE

## 2024-06-10 PROCEDURE — 3078F DIAST BP <80 MM HG: CPT | Performed by: INTERNAL MEDICINE

## 2024-06-10 PROCEDURE — 3074F SYST BP LT 130 MM HG: CPT | Performed by: INTERNAL MEDICINE

## 2024-06-10 PROCEDURE — 93005 ELECTROCARDIOGRAM TRACING: CPT | Performed by: INTERNAL MEDICINE

## 2024-06-10 PROCEDURE — 93010 ELECTROCARDIOGRAM REPORT: CPT | Performed by: INTERNAL MEDICINE

## 2024-06-10 PROCEDURE — 99213 OFFICE O/P EST LOW 20 MIN: CPT | Performed by: INTERNAL MEDICINE

## 2024-06-10 NOTE — PROGRESS NOTES
Chief Complaint   Patient presents with    Atrial Fibrillation     F/V Dx: PAF (paroxysmal atrial fibrillation) (HCC)       Subjective     Faraz Altman is a 68 y.o. male who presents today with metastatic squamous cell CA, shortness of breath, recurrent infections.  Persistent atrial fibrillation.  Status post watchman.  Previously seen by Dr. Simpson for watchman.  On diuretics.  Previous renal transplant.    Past Medical History:   Diagnosis Date    A-fib (HCC)     Arrhythmia     Benign essential hypertension     Diabetes (HCC)     type 2    Heart burn     Hemorrhagic disorder (HCC)     Eliquis    Hyperlipoproteinemia     Hypertension     not on meds anymore    Indigestion     Myocardial infarct (HCC) 2013    stent    Polycystic kidney 09/10/2010    RIGHT KIDNEY TRANSPLANT    Presence of Watchman left atrial appendage closure device 02/29/2024    Sleep apnea 01/30/2024    states he was told he had sleep apnea but has not had a sleep study    Snoring      Past Surgical History:   Procedure Laterality Date    STENT PLACEMENT  2013    cardiac    KNEE MANIPULATION  02/16/2012    Performed by LATOYA CONNER at SURGERY [facility] ORS    KNEE UNICOMPARTMENTAL  12/23/2011    Performed by LATOYA CONNER at SURGERY [facility] ORS    KNEE ARTHROSCOPY  12/23/2011    Performed by LATOYA CONNER at SURGERY [facility] ORS    KNEE ARTHROSCOPY  05/03/2011    Performed by HANANE GOLDMAN at SURGERY SAME DAY [facility]    MENISCECTOMY, KNEE, MEDIAL  05/03/2011    Performed by HANANE GOLDMAN at SURGERY SAME DAY [facility] ORS    OTHER  09/10/2010    RIGHT KIDNEY TRANSPLANT    KNEE ARTHROPLASTY TOTAL  01/12/2007    RIGHT    KNEE ARTHROSCOPY  04/10/2006    RIGHT    OTHER ORTHOPEDIC SURGERY  07/08/1974    LEFT KNEE DEBRIDEMENT     No family history on file.  Social History     Socioeconomic History    Marital status: Single     Spouse name: Not on file    Number of children: Not on file    Years of education: Not on file    Highest  education level: Not on file   Occupational History    Not on file   Tobacco Use    Smoking status: Never     Passive exposure: Never    Smokeless tobacco: Never   Vaping Use    Vaping status: Never Used   Substance and Sexual Activity    Alcohol use: No    Drug use: No    Sexual activity: Yes     Partners: Female   Other Topics Concern    Not on file   Social History Narrative    Not on file     Social Determinants of Health     Financial Resource Strain: Not on file   Food Insecurity: No Food Insecurity (5/29/2024)    Hunger Vital Sign     Worried About Running Out of Food in the Last Year: Never true     Ran Out of Food in the Last Year: Never true   Transportation Needs: No Transportation Needs (5/29/2024)    PRAPARE - Transportation     Lack of Transportation (Medical): No     Lack of Transportation (Non-Medical): No   Physical Activity: Not on file   Stress: Not on file   Social Connections: Not on file   Intimate Partner Violence: Not At Risk (5/29/2024)    Humiliation, Afraid, Rape, and Kick questionnaire     Fear of Current or Ex-Partner: No     Emotionally Abused: No     Physically Abused: No     Sexually Abused: No   Housing Stability: Low Risk  (5/29/2024)    Housing Stability Vital Sign     Unable to Pay for Housing in the Last Year: No     Number of Places Lived in the Last Year: 1     Unstable Housing in the Last Year: No     Allergies   Allergen Reactions    Doxycycline Rash     Sweats and shakes: 9/28/17: Clarified allergy with patient. Allergy was in 1998 and he doesn't remember what happened. He thought the medication is for pain.  Tolerates doxycycline 9/2017     Outpatient Encounter Medications as of 6/10/2024   Medication Sig Dispense Refill    furosemide (LASIX) 40 MG Tab Take 1 Tablet by mouth every day. 30 Tablet 3    potassium chloride SA (K-DUR) 10 MEQ Tab CR Take 1 Tablet by mouth every day. 30 Each 3    insulin glargine 100 UNIT/ML Solution Pen-injector injection Inject 20 Units under  "the skin every evening. 15 mL 3    Insulin Pen Needle 32 G x 4 mm Use one pen needle in pen device to inject insulin once daily. 100 Each 0    predniSONE (DELTASONE) 20 MG Tab Take 20 mg by mouth every day.      acarbose (PRECOSE) 50 MG Tab TAKE 1 TABLET BY MOUTH THREE TIMES DAILY WITH MEALS 270 Tablet 3    benzonatate (TESSALON) 200 MG capsule Take 1 Capsule by mouth 3 times a day as needed for Cough. 30 Capsule 1    metroNIDAZOLE (FLAGYL) 500 MG Tab Crush 1-2 tablets and apply dirrectly into wound bed daily as needed for odor control. 30 Tablet 3    Sirolimus 2 MG Tab Take 6 mg by mouth every day. 2 mg x 3 tablets = 6 mg      omeprazole (PRILOSEC) 20 MG delayed-release capsule Take 1 Capsule by mouth every day. Indications: Heartburn 90 Capsule 3    gabapentin (NEURONTIN) 400 MG Cap Take 1 Capsule by mouth 2 times a day. 90 Capsule 1    metoprolol SR (TOPROL XL) 25 MG TABLET SR 24 HR Take 1 Tablet by mouth every day. May take additional one tab daily for heart rate sustaining >110 BPM. 100 Tablet 3    atorvastatin (LIPITOR) 80 MG tablet Take 1 Tablet by mouth at bedtime. 100 Tablet 3    aspirin 81 MG EC tablet Take 1 Tablet by mouth every day. 100 Tablet 1    metFORMIN (GLUCOPHAGE) 500 MG Tab Take 1 Tablet by mouth 2 times a day with meals. (Patient not taking: Reported on 6/10/2024) 60 Tablet 3    TRADJENTA 5 MG Tab tablet TAKE 1 TABLET BY MOUTH EVERY DAY (Patient not taking: Reported on 6/10/2024) 100 Tablet 3    glyBURIDE (DIABETA) 5 MG Tab TAKE 2 TABLETS BY MOUTH TWICE DAILY WITH MEALS (Patient not taking: Reported on 6/10/2024) 400 Tablet 3    pioglitazone (ACTOS) 45 MG Tab TAKE ONE TABLET BY MOUTH ONCE DAILY (Patient not taking: Reported on 6/10/2024) 100 Tablet 3     No facility-administered encounter medications on file as of 6/10/2024.     ROS           Objective     /62 (BP Location: Left arm, Patient Position: Sitting, BP Cuff Size: Adult)   Pulse 66   Resp 16   Ht 1.956 m (6' 5\")   SpO2 93%  "  BMI 27.17 kg/m²     Physical Exam  Constitutional:       Appearance: He is well-developed.      Comments: Somewhat ill-appearing   HENT:      Head: Normocephalic and atraumatic.   Cardiovascular:      Rate and Rhythm: Normal rate and regular rhythm.      Heart sounds: No murmur heard.     No friction rub. No gallop.   Pulmonary:      Effort: Pulmonary effort is normal.      Breath sounds: Rales present.   Abdominal:      Palpations: Abdomen is soft.   Musculoskeletal:         General: Normal range of motion.      Cervical back: Normal range of motion and neck supple.   Skin:     General: Skin is warm and dry.   Neurological:      Mental Status: He is alert and oriented to person, place, and time.   Psychiatric:         Behavior: Behavior normal.         Thought Content: Thought content normal.         Judgment: Judgment normal.                Assessment & Plan     1. PAF (paroxysmal atrial fibrillation) ([facility])  EKG    EKG      2. S/P insertion of non-drug eluting coronary artery stent        3. Hypercoagulable state due to paroxysmal atrial fibrillation (HCC)        4. Presence of Watchman left atrial appendage closure device        5. Persistent atrial fibrillation (HCC)            Medical Decision Making: Today's Assessment/Status/Plan:   1.  Persistent atrial fibrillation minimal treatment options at this point.  2.  Status post watchman on aspirin.  3.  Metastatic squamous cell CA. followed by oncology.  Patient will call them to possibly arrange antibiotics and oxygen.

## 2024-06-11 ENCOUNTER — HOSPITAL ENCOUNTER (INPATIENT)
Facility: MEDICAL CENTER | Age: 69
LOS: 10 days | DRG: 291 | End: 2024-06-21
Attending: EMERGENCY MEDICINE | Admitting: INTERNAL MEDICINE
Payer: MEDICARE

## 2024-06-11 ENCOUNTER — OFFICE VISIT (OUTPATIENT)
Dept: WOUND CARE | Facility: MEDICAL CENTER | Age: 69
End: 2024-06-11
Attending: NURSE PRACTITIONER
Payer: MEDICARE

## 2024-06-11 ENCOUNTER — APPOINTMENT (OUTPATIENT)
Dept: RADIOLOGY | Facility: MEDICAL CENTER | Age: 69
DRG: 291 | End: 2024-06-11
Attending: EMERGENCY MEDICINE
Payer: MEDICARE

## 2024-06-11 ENCOUNTER — APPOINTMENT (OUTPATIENT)
Dept: RADIOLOGY | Facility: MEDICAL CENTER | Age: 69
DRG: 291 | End: 2024-06-11
Payer: MEDICARE

## 2024-06-11 DIAGNOSIS — J96.01 ACUTE RESPIRATORY FAILURE WITH HYPOXIA (HCC): ICD-10-CM

## 2024-06-11 DIAGNOSIS — R09.02 HYPOXIA: ICD-10-CM

## 2024-06-11 DIAGNOSIS — R60.9 DEPENDENT EDEMA: ICD-10-CM

## 2024-06-11 DIAGNOSIS — Z53.8 APPOINTMENT CANCELED BY HOSPITAL: ICD-10-CM

## 2024-06-11 DIAGNOSIS — N18.9 ACUTE ON CHRONIC RENAL INSUFFICIENCY: ICD-10-CM

## 2024-06-11 DIAGNOSIS — N28.9 ACUTE ON CHRONIC RENAL INSUFFICIENCY: ICD-10-CM

## 2024-06-11 DIAGNOSIS — J90 BILATERAL PLEURAL EFFUSION: ICD-10-CM

## 2024-06-11 DIAGNOSIS — N28.9 RENAL INSUFFICIENCY: ICD-10-CM

## 2024-06-11 PROBLEM — I50.9 CONGESTIVE HEART FAILURE (HCC): Status: ACTIVE | Noted: 2024-01-01

## 2024-06-11 PROBLEM — R42 ORTHOSTATIC DIZZINESS: Status: ACTIVE | Noted: 2024-06-11

## 2024-06-11 PROBLEM — E87.70 HYPERVOLEMIA: Status: ACTIVE | Noted: 2024-01-01

## 2024-06-11 PROBLEM — I89.0 LYMPHEDEMA: Status: ACTIVE | Noted: 2024-06-11

## 2024-06-11 LAB
ALBUMIN SERPL BCP-MCNC: 3.4 G/DL (ref 3.2–4.9)
ALBUMIN/GLOB SERPL: 1.5 G/DL
ALP SERPL-CCNC: 66 U/L (ref 30–99)
ALT SERPL-CCNC: 20 U/L (ref 2–50)
ANION GAP SERPL CALC-SCNC: 13 MMOL/L (ref 7–16)
ANISOCYTOSIS BLD QL SMEAR: ABNORMAL
AST SERPL-CCNC: 19 U/L (ref 12–45)
BASOPHILS # BLD AUTO: 0.9 % (ref 0–1.8)
BASOPHILS # BLD: 0.07 K/UL (ref 0–0.12)
BILIRUB SERPL-MCNC: 0.3 MG/DL (ref 0.1–1.5)
BUN SERPL-MCNC: 80 MG/DL (ref 8–22)
BURR CELLS BLD QL SMEAR: NORMAL
CALCIUM ALBUM COR SERPL-MCNC: 9.5 MG/DL (ref 8.5–10.5)
CALCIUM SERPL-MCNC: 9 MG/DL (ref 8.5–10.5)
CHLORIDE SERPL-SCNC: 100 MMOL/L (ref 96–112)
CO2 SERPL-SCNC: 22 MMOL/L (ref 20–33)
CREAT SERPL-MCNC: 2.59 MG/DL (ref 0.5–1.4)
EKG IMPRESSION: NORMAL
EOSINOPHIL # BLD AUTO: 0 K/UL (ref 0–0.51)
EOSINOPHIL NFR BLD: 0 % (ref 0–6.9)
ERYTHROCYTE [DISTWIDTH] IN BLOOD BY AUTOMATED COUNT: 50.4 FL (ref 35.9–50)
GFR SERPLBLD CREATININE-BSD FMLA CKD-EPI: 26 ML/MIN/1.73 M 2
GLOBULIN SER CALC-MCNC: 2.3 G/DL (ref 1.9–3.5)
GLUCOSE BLD STRIP.AUTO-MCNC: 176 MG/DL (ref 65–99)
GLUCOSE SERPL-MCNC: 174 MG/DL (ref 65–99)
HCT VFR BLD AUTO: 38.1 % (ref 42–52)
HGB BLD-MCNC: 12 G/DL (ref 14–18)
LYMPHOCYTES # BLD AUTO: 0.2 K/UL (ref 1–4.8)
LYMPHOCYTES NFR BLD: 2.6 % (ref 22–41)
MANUAL DIFF BLD: NORMAL
MCH RBC QN AUTO: 26.2 PG (ref 27–33)
MCHC RBC AUTO-ENTMCNC: 31.5 G/DL (ref 32.3–36.5)
MCV RBC AUTO: 83.2 FL (ref 81.4–97.8)
METAMYELOCYTES NFR BLD MANUAL: 1.8 %
MICROCYTES BLD QL SMEAR: ABNORMAL
MONOCYTES # BLD AUTO: 0.54 K/UL (ref 0–0.85)
MONOCYTES NFR BLD AUTO: 7 % (ref 0–13.4)
MORPHOLOGY BLD-IMP: NORMAL
NEUTROPHILS # BLD AUTO: 6.75 K/UL (ref 1.82–7.42)
NEUTROPHILS NFR BLD: 84.2 % (ref 44–72)
NEUTS BAND NFR BLD MANUAL: 3.5 % (ref 0–10)
NRBC # BLD AUTO: 0.02 K/UL
NRBC BLD-RTO: 0.3 /100 WBC (ref 0–0.2)
OVALOCYTES BLD QL SMEAR: NORMAL
PLATELET # BLD AUTO: 72 K/UL (ref 164–446)
PLATELET BLD QL SMEAR: NORMAL
PLATELETS.RETICULATED NFR BLD AUTO: 13.2 % (ref 0.6–13.1)
POIKILOCYTOSIS BLD QL SMEAR: NORMAL
POTASSIUM SERPL-SCNC: 5.2 MMOL/L (ref 3.6–5.5)
PROCALCITONIN SERPL-MCNC: 0.41 NG/ML
PROT SERPL-MCNC: 5.7 G/DL (ref 6–8.2)
RBC # BLD AUTO: 4.58 M/UL (ref 4.7–6.1)
RBC BLD AUTO: PRESENT
SODIUM SERPL-SCNC: 135 MMOL/L (ref 135–145)
WBC # BLD AUTO: 7.7 K/UL (ref 4.8–10.8)

## 2024-06-11 PROCEDURE — 0241U HCHG SARS-COV-2 COVID-19 NFCT DS RESP RNA 4 TRGT MIC: CPT

## 2024-06-11 PROCEDURE — 700111 HCHG RX REV CODE 636 W/ 250 OVERRIDE (IP): Performed by: EMERGENCY MEDICINE

## 2024-06-11 PROCEDURE — 99285 EMERGENCY DEPT VISIT HI MDM: CPT

## 2024-06-11 PROCEDURE — 96374 THER/PROPH/DIAG INJ IV PUSH: CPT

## 2024-06-11 PROCEDURE — A9270 NON-COVERED ITEM OR SERVICE: HCPCS

## 2024-06-11 PROCEDURE — 71045 X-RAY EXAM CHEST 1 VIEW: CPT

## 2024-06-11 PROCEDURE — 80053 COMPREHEN METABOLIC PANEL: CPT

## 2024-06-11 PROCEDURE — A9270 NON-COVERED ITEM OR SERVICE: HCPCS | Performed by: INTERNAL MEDICINE

## 2024-06-11 PROCEDURE — 85007 BL SMEAR W/DIFF WBC COUNT: CPT

## 2024-06-11 PROCEDURE — 93005 ELECTROCARDIOGRAM TRACING: CPT

## 2024-06-11 PROCEDURE — 770020 HCHG ROOM/CARE - TELE (206)

## 2024-06-11 PROCEDURE — 700102 HCHG RX REV CODE 250 W/ 637 OVERRIDE(OP)

## 2024-06-11 PROCEDURE — 84145 PROCALCITONIN (PCT): CPT

## 2024-06-11 PROCEDURE — 93005 ELECTROCARDIOGRAM TRACING: CPT | Performed by: EMERGENCY MEDICINE

## 2024-06-11 PROCEDURE — 99223 1ST HOSP IP/OBS HIGH 75: CPT | Mod: GC,AI | Performed by: INTERNAL MEDICINE

## 2024-06-11 PROCEDURE — 85055 RETICULATED PLATELET ASSAY: CPT

## 2024-06-11 PROCEDURE — 82962 GLUCOSE BLOOD TEST: CPT

## 2024-06-11 PROCEDURE — 99999 PR NO CHARGE: CPT | Performed by: STUDENT IN AN ORGANIZED HEALTH CARE EDUCATION/TRAINING PROGRAM

## 2024-06-11 PROCEDURE — 85027 COMPLETE CBC AUTOMATED: CPT

## 2024-06-11 PROCEDURE — 36415 COLL VENOUS BLD VENIPUNCTURE: CPT

## 2024-06-11 PROCEDURE — 700111 HCHG RX REV CODE 636 W/ 250 OVERRIDE (IP)

## 2024-06-11 PROCEDURE — 700102 HCHG RX REV CODE 250 W/ 637 OVERRIDE(OP): Performed by: INTERNAL MEDICINE

## 2024-06-11 RX ORDER — PREDNISONE 20 MG/1
20 TABLET ORAL DAILY
Status: DISCONTINUED | OUTPATIENT
Start: 2024-06-12 | End: 2024-06-21 | Stop reason: HOSPADM

## 2024-06-11 RX ORDER — METOPROLOL SUCCINATE 25 MG/1
25 TABLET, EXTENDED RELEASE ORAL DAILY
Status: DISCONTINUED | OUTPATIENT
Start: 2024-06-12 | End: 2024-06-14

## 2024-06-11 RX ORDER — ATORVASTATIN CALCIUM 80 MG/1
80 TABLET, FILM COATED ORAL
Status: DISCONTINUED | OUTPATIENT
Start: 2024-06-11 | End: 2024-06-21 | Stop reason: HOSPADM

## 2024-06-11 RX ORDER — ASPIRIN 81 MG/1
81 TABLET ORAL DAILY
Status: DISCONTINUED | OUTPATIENT
Start: 2024-06-12 | End: 2024-06-21 | Stop reason: HOSPADM

## 2024-06-11 RX ORDER — HEPARIN SODIUM 5000 [USP'U]/ML
5000 INJECTION, SOLUTION INTRAVENOUS; SUBCUTANEOUS EVERY 8 HOURS
Status: DISCONTINUED | OUTPATIENT
Start: 2024-06-11 | End: 2024-06-11

## 2024-06-11 RX ORDER — GABAPENTIN 400 MG/1
400 CAPSULE ORAL 2 TIMES DAILY
Status: DISCONTINUED | OUTPATIENT
Start: 2024-06-11 | End: 2024-06-18

## 2024-06-11 RX ORDER — INSULIN LISPRO 100 [IU]/ML
1-6 INJECTION, SOLUTION INTRAVENOUS; SUBCUTANEOUS
Status: DISCONTINUED | OUTPATIENT
Start: 2024-06-11 | End: 2024-06-14

## 2024-06-11 RX ORDER — FUROSEMIDE 10 MG/ML
60 INJECTION INTRAMUSCULAR; INTRAVENOUS ONCE
Status: COMPLETED | OUTPATIENT
Start: 2024-06-11 | End: 2024-06-11

## 2024-06-11 RX ORDER — LEVOFLOXACIN 750 MG/1
750 TABLET, FILM COATED ORAL
Status: ON HOLD | COMMUNITY
Start: 2024-06-10 | End: 2024-06-21

## 2024-06-11 RX ORDER — POLYETHYLENE GLYCOL 3350 17 G/17G
1 POWDER, FOR SOLUTION ORAL
Status: DISCONTINUED | OUTPATIENT
Start: 2024-06-11 | End: 2024-06-21 | Stop reason: HOSPADM

## 2024-06-11 RX ORDER — DEXTROSE MONOHYDRATE 25 G/50ML
25 INJECTION, SOLUTION INTRAVENOUS
Status: DISCONTINUED | OUTPATIENT
Start: 2024-06-11 | End: 2024-06-11

## 2024-06-11 RX ORDER — BENZONATATE 100 MG/1
200 CAPSULE ORAL 3 TIMES DAILY PRN
Status: DISCONTINUED | OUTPATIENT
Start: 2024-06-11 | End: 2024-06-21 | Stop reason: HOSPADM

## 2024-06-11 RX ORDER — FLUTICASONE PROPIONATE AND SALMETEROL 250; 50 UG/1; UG/1
1 POWDER RESPIRATORY (INHALATION) EVERY 12 HOURS
COMMUNITY

## 2024-06-11 RX ORDER — OXYCODONE HYDROCHLORIDE 5 MG/1
2.5 TABLET ORAL
Status: DISCONTINUED | OUTPATIENT
Start: 2024-06-11 | End: 2024-06-21 | Stop reason: HOSPADM

## 2024-06-11 RX ORDER — FUROSEMIDE 10 MG/ML
40 INJECTION INTRAMUSCULAR; INTRAVENOUS
Status: DISCONTINUED | OUTPATIENT
Start: 2024-06-12 | End: 2024-06-13

## 2024-06-11 RX ORDER — OXYCODONE HYDROCHLORIDE 5 MG/1
5 TABLET ORAL
Status: DISCONTINUED | OUTPATIENT
Start: 2024-06-11 | End: 2024-06-21 | Stop reason: HOSPADM

## 2024-06-11 RX ORDER — HEPARIN SODIUM 5000 [USP'U]/ML
5000 INJECTION, SOLUTION INTRAVENOUS; SUBCUTANEOUS EVERY 8 HOURS
Status: DISCONTINUED | OUTPATIENT
Start: 2024-06-11 | End: 2024-06-14

## 2024-06-11 RX ORDER — ASPIRIN 81 MG/1
81 TABLET ORAL EVERY EVENING
Status: DISCONTINUED | OUTPATIENT
Start: 2024-06-11 | End: 2024-06-11

## 2024-06-11 RX ORDER — LEVOFLOXACIN 750 MG/1
750 TABLET, FILM COATED ORAL
Qty: 3 TABLET | Refills: 0 | Status: DISCONTINUED | OUTPATIENT
Start: 2024-06-12 | End: 2024-06-12

## 2024-06-11 RX ORDER — SIROLIMUS 0.5 MG/1
4 TABLET, FILM COATED ORAL DAILY
Status: DISCONTINUED | OUTPATIENT
Start: 2024-06-12 | End: 2024-06-21 | Stop reason: HOSPADM

## 2024-06-11 RX ORDER — OMEPRAZOLE 20 MG/1
20 CAPSULE, DELAYED RELEASE ORAL DAILY
Status: DISCONTINUED | OUTPATIENT
Start: 2024-06-12 | End: 2024-06-21 | Stop reason: HOSPADM

## 2024-06-11 RX ORDER — METOLAZONE 2.5 MG/1
2.5 TABLET ORAL
Status: DISCONTINUED | OUTPATIENT
Start: 2024-06-12 | End: 2024-06-12

## 2024-06-11 RX ORDER — ACETAMINOPHEN 325 MG/1
650 TABLET ORAL EVERY 6 HOURS PRN
Status: DISCONTINUED | OUTPATIENT
Start: 2024-06-11 | End: 2024-06-21 | Stop reason: HOSPADM

## 2024-06-11 RX ORDER — INSULIN LISPRO 100 [IU]/ML
1-6 INJECTION, SOLUTION INTRAVENOUS; SUBCUTANEOUS
Status: DISCONTINUED | OUTPATIENT
Start: 2024-06-11 | End: 2024-06-11

## 2024-06-11 RX ORDER — HYDROMORPHONE HYDROCHLORIDE 1 MG/ML
0.25 INJECTION, SOLUTION INTRAMUSCULAR; INTRAVENOUS; SUBCUTANEOUS
Status: DISCONTINUED | OUTPATIENT
Start: 2024-06-11 | End: 2024-06-11

## 2024-06-11 RX ORDER — AMOXICILLIN 250 MG
2 CAPSULE ORAL EVERY EVENING
Status: DISCONTINUED | OUTPATIENT
Start: 2024-06-11 | End: 2024-06-21 | Stop reason: HOSPADM

## 2024-06-11 RX ORDER — FLUTICASONE PROPIONATE AND SALMETEROL 250; 50 UG/1; UG/1
1 POWDER RESPIRATORY (INHALATION) EVERY 12 HOURS
Status: DISCONTINUED | OUTPATIENT
Start: 2024-06-11 | End: 2024-06-11

## 2024-06-11 RX ORDER — ACETAMINOPHEN AND CODEINE PHOSPHATE 300; 30 MG/1; MG/1
1 TABLET ORAL
COMMUNITY

## 2024-06-11 RX ADMIN — FUROSEMIDE 60 MG: 10 INJECTION INTRAMUSCULAR; INTRAVENOUS at 13:47

## 2024-06-11 RX ADMIN — ATORVASTATIN CALCIUM 80 MG: 80 TABLET, FILM COATED ORAL at 20:43

## 2024-06-11 RX ADMIN — MOMETASONE FUROATE AND FORMOTEROL FUMARATE DIHYDRATE 2 PUFF: 200; 5 AEROSOL RESPIRATORY (INHALATION) at 22:48

## 2024-06-11 RX ADMIN — INSULIN LISPRO 1 UNITS: 100 INJECTION, SOLUTION INTRAVENOUS; SUBCUTANEOUS at 20:44

## 2024-06-11 RX ADMIN — HEPARIN SODIUM 5000 UNITS: 5000 INJECTION, SOLUTION INTRAVENOUS; SUBCUTANEOUS at 17:26

## 2024-06-11 RX ADMIN — GABAPENTIN 400 MG: 400 CAPSULE ORAL at 17:26

## 2024-06-11 RX ADMIN — OXYCODONE HYDROCHLORIDE 5 MG: 5 TABLET ORAL at 22:48

## 2024-06-11 RX ADMIN — INSULIN GLARGINE-YFGN 10 UNITS: 100 INJECTION, SOLUTION SUBCUTANEOUS at 20:43

## 2024-06-11 ASSESSMENT — LIFESTYLE VARIABLES
EVER FELT BAD OR GUILTY ABOUT YOUR DRINKING: NO
TOTAL SCORE: 0
ALCOHOL_USE: NO
ON A TYPICAL DAY WHEN YOU DRINK ALCOHOL HOW MANY DRINKS DO YOU HAVE: 0
HOW MANY TIMES IN THE PAST YEAR HAVE YOU HAD 5 OR MORE DRINKS IN A DAY: 0
HAVE PEOPLE ANNOYED YOU BY CRITICIZING YOUR DRINKING: NO
HAVE YOU EVER FELT YOU SHOULD CUT DOWN ON YOUR DRINKING: NO
TOTAL SCORE: 0
AVERAGE NUMBER OF DAYS PER WEEK YOU HAVE A DRINK CONTAINING ALCOHOL: 0
TOTAL SCORE: 0
DOES PATIENT WANT TO STOP DRINKING: NO
CONSUMPTION TOTAL: NEGATIVE
EVER HAD A DRINK FIRST THING IN THE MORNING TO STEADY YOUR NERVES TO GET RID OF A HANGOVER: NO

## 2024-06-11 ASSESSMENT — ENCOUNTER SYMPTOMS
ABDOMINAL PAIN: 0
CHILLS: 0
WHEEZING: 0
HEARTBURN: 0
COUGH: 1
VOMITING: 0
CONSTIPATION: 0
FEVER: 0
HEADACHES: 1
ORTHOPNEA: 1
DIARRHEA: 0
SPUTUM PRODUCTION: 1
PALPITATIONS: 0
NAUSEA: 0
SHORTNESS OF BREATH: 1
DIZZINESS: 1
HEMOPTYSIS: 0

## 2024-06-11 ASSESSMENT — COGNITIVE AND FUNCTIONAL STATUS - GENERAL
MOVING TO AND FROM BED TO CHAIR: A LITTLE
SUGGESTED CMS G CODE MODIFIER DAILY ACTIVITY: CH
SUGGESTED CMS G CODE MODIFIER MOBILITY: CK
MOVING FROM LYING ON BACK TO SITTING ON SIDE OF FLAT BED: A LITTLE
DAILY ACTIVITIY SCORE: 24
WALKING IN HOSPITAL ROOM: A LITTLE
MOBILITY SCORE: 19
CLIMB 3 TO 5 STEPS WITH RAILING: A LITTLE
TURNING FROM BACK TO SIDE WHILE IN FLAT BAD: A LITTLE

## 2024-06-11 ASSESSMENT — PAIN DESCRIPTION - PAIN TYPE
TYPE: ACUTE PAIN

## 2024-06-11 ASSESSMENT — SOCIAL DETERMINANTS OF HEALTH (SDOH)
WITHIN THE LAST YEAR, HAVE YOU BEEN AFRAID OF YOUR PARTNER OR EX-PARTNER?: NO
WITHIN THE LAST YEAR, HAVE YOU BEEN KICKED, HIT, SLAPPED, OR OTHERWISE PHYSICALLY HURT BY YOUR PARTNER OR EX-PARTNER?: NO
IN THE PAST 12 MONTHS, HAS THE ELECTRIC, GAS, OIL, OR WATER COMPANY THREATENED TO SHUT OFF SERVICE IN YOUR HOME?: NO
WITHIN THE LAST YEAR, HAVE TO BEEN RAPED OR FORCED TO HAVE ANY KIND OF SEXUAL ACTIVITY BY YOUR PARTNER OR EX-PARTNER?: NO
WITHIN THE PAST 12 MONTHS, THE FOOD YOU BOUGHT JUST DIDN'T LAST AND YOU DIDN'T HAVE MONEY TO GET MORE: NEVER TRUE
WITHIN THE LAST YEAR, HAVE YOU BEEN HUMILIATED OR EMOTIONALLY ABUSED IN OTHER WAYS BY YOUR PARTNER OR EX-PARTNER?: NO
WITHIN THE PAST 12 MONTHS, YOU WORRIED THAT YOUR FOOD WOULD RUN OUT BEFORE YOU GOT THE MONEY TO BUY MORE: NEVER TRUE

## 2024-06-11 ASSESSMENT — FIBROSIS 4 INDEX: FIB4 SCORE: 3.38

## 2024-06-11 NOTE — H&P
History & Physical Note    Date of Admission: 6/11/2024  Admission Status: Inpatient  [facility] Team: [facility] IM Green Team and [facility] IM Purple Team  Attending: Charlie Casey M.D.   Senior Resident: Dr. Estrada  Intern: Dr. Simental  Contact Number: [phone]    Chief Complaint: 3 weeks of leg swelling and SOB     History of Present Illness (HPI):  Jama is a 68 y.o. 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   He has also been experiencing increased SOB while laying down and has gained about 5 lbs in the last month and states his nephrologist would like him to lose at least 5 pounds in this visit. He says that these fluid overloaded states have started since all of his providers have been attempting to properly treat his CKD and SCC. He has been taking his PO lasix home dose consistently since his last hospitalization.  For the last few days he has had a green productive cough and was prescribed levofloxacin yesterday for the cough and says this has helped him cough less and cleared the phlegm.    Review of Systems: Review of Systems   Constitutional:  Negative for chills and fever.   Respiratory:  Positive for cough, sputum production and shortness of breath. Negative for hemoptysis and wheezing.    Cardiovascular:  Positive for orthopnea and leg swelling. Negative for chest pain and palpitations.   Gastrointestinal:  Negative for abdominal pain, constipation, diarrhea, nausea and vomiting.     Past Medical History:   Past Medical History was reviewed with patient.   has a past medical  history of A-fib (HCC), Arrhythmia, Benign essential hypertension, Diabetes (HCC), Heart burn, Hemorrhagic disorder (HCC), Hyperlipoproteinemia, Hypertension, Indigestion, Myocardial infarct (HCC) (2013), Polycystic kidney (09/10/2010), Presence of Watchman left atrial appendage closure device (02/29/2024), Sleep apnea (01/30/2024), and Snoring.    He has no past medical history of Pain.    Past Surgical History: Past Surgical History was not reviewed with patient.   has a past surgical history that includes knee arthroplasty total (01/12/2007); knee arthroscopy (04/10/2006); other orthopedic surgery (07/08/1974); other (09/10/2010); knee arthroscopy (05/03/2011); meniscectomy, knee, medial (05/03/2011); knee unicompartmental (12/23/2011); knee arthroscopy (12/23/2011); knee manipulation (02/16/2012); and stent placement (2013).    Medications: Medications have been not reviewed with patient.  Prior to Admission Medications   Prescriptions Last Dose Informant Patient Reported? Taking?   Insulin Pen Needle 32 G x 4 mm N/A at N/A Patient No No   Sig: Use one pen needle in pen device to inject insulin once daily.   Sirolimus 2 MG Tab 6/11/2024 at AM Patient Yes Yes   Sig: Take 4 mg by mouth every day. 4 mg = 2 tabs   TRADJENTA 5 MG Tab tablet NO LONGER TAKING at NO LONGER TAKING Patient No No   Sig: TAKE 1 TABLET BY MOUTH EVERY DAY   Patient not taking: Reported on 6/10/2024   acarbose (PRECOSE) 50 MG Tab NO LONGER TAKIN at NO LONGER TAKING Patient No No   Sig: TAKE 1 TABLET BY MOUTH THREE TIMES DAILY WITH MEALS   aspirin 81 MG EC tablet 6/10/2024 at PM Patient No Yes   Sig: Take 1 Tablet by mouth every day.   Patient taking differently: Take 81 mg by mouth every evening. Indications: Buildup of Plaques in Large Arteries Leading to the Brain   atorvastatin (LIPITOR) 80 MG tablet 6/10/2024 at PM Patient No Yes   Sig: Take 1 Tablet by mouth at bedtime.   benzonatate (TESSALON) 200 MG capsule 6/11/2024 at AM Patient No  Yes   Sig: Take 1 Capsule by mouth 3 times a day as needed for Cough.   fluticasone-salmeterol (WIXELA INHUB) 250-50 MCG/ACT AEROSOL POWDER, BREATH ACTIVATED 6/11/2024 at AM Patient Yes Yes   Sig: Inhale 1 Puff every 12 hours.   furosemide (LASIX) 40 MG Tab 6/11/2024 at AM Patient No Yes   Sig: Take 1 Tablet by mouth every day.   gabapentin (NEURONTIN) 400 MG Cap 6/11/2024 at AM Patient No Yes   Sig: Take 1 Capsule by mouth 2 times a day.   Patient taking differently: Take 800 mg by mouth 2 times a day. 800 mg = 2 tabs   glyBURIDE (DIABETA) 5 MG Tab NO LONGER TAKING at NO LONGER TAKING Patient No No   Sig: TAKE 2 TABLETS BY MOUTH TWICE DAILY WITH MEALS   Patient not taking: Reported on 6/10/2024   insulin glargine 100 UNIT/ML Solution Pen-injector injection 6/10/2024 at PM Patient No Yes   Sig: Inject 20 Units under the skin every evening.   Patient taking differently: Inject 20-21 Units under the skin every evening.   levoFLOXacin (LEVAQUIN) 750 MG tablet  Patient Yes Yes   Sig: Take 750 mg by mouth every day. for 5 days   metFORMIN (GLUCOPHAGE) 500 MG Tab NO LONGER TAKING at NO LONGER TAKING Patient No No   Sig: Take 1 Tablet by mouth 2 times a day with meals.   Patient not taking: Reported on 6/10/2024   metoprolol SR (TOPROL XL) 25 MG TABLET SR 24 HR 6/11/2024 at AM Patient No Yes   Sig: Take 1 Tablet by mouth every day. May take additional one tab daily for heart rate sustaining >110 BPM.   metroNIDAZOLE (FLAGYL) 500 MG Tab 2 WEEKS AGO at PRN Patient No No   Sig: Crush 1-2 tablets and apply dirrectly into wound bed daily as needed for odor control.   omeprazole (PRILOSEC) 20 MG delayed-release capsule 6/11/2024 at AM Patient No Yes   Sig: Take 1 Capsule by mouth every day. Indications: Heartburn   pioglitazone (ACTOS) 45 MG Tab NO LONGER TAKING at NO LONGER TAKING Patient No No   Sig: TAKE ONE TABLET BY MOUTH ONCE DAILY   Patient not taking: Reported on 6/10/2024   potassium chloride SA (K-DUR) 10 MEQ Tab CR  6/11/2024 at AM Patient No Yes   Sig: Take 1 Tablet by mouth every day.   predniSONE (DELTASONE) 10 MG Tab 6/11/2024 at AM Patient Yes Yes   Sig: Take 20 mg by mouth every day. 20 mg = 2 tabs      Facility-Administered Medications: None        Allergies: Allergies have been not reviewed with patient.  Allergies   Allergen Reactions    Doxycycline Rash     Sweats and shakes: 9/28/17: Clarified allergy with patient. Allergy was in 1998 and he doesn't remember what happened. He thought the medication is for pain.  Tolerates doxycycline 9/2017       Family History: Hx of diabetes in brother    Social History:   Tobacco: Denies use  Alcohol: Denies use in 10 years   Recreational drugs (illegal and prescription):  Denies use   Primary Care Provider: reviewed Ganesh Benton III, M.D.    Physical Exam:   Vitals:  Temp:  [37.6 °C (99.6 °F)] 37.6 °C (99.6 °F)  Pulse:  [65-98] 96  Resp:  [18-19] 19  BP: (103-166)/() 103/65  SpO2:  [90 %-94 %] 92 %    Physical Exam  Constitutional:       General: He is not in acute distress.     Appearance: He is ill-appearing.   HENT:      Head: Normocephalic and atraumatic.      Nose: Nose normal.   Eyes:      Extraocular Movements: Extraocular movements intact.   Cardiovascular:      Rate and Rhythm: Normal rate. Rhythm irregular.      Heart sounds: Normal heart sounds. No murmur heard.     No friction rub. No gallop.   Pulmonary:      Effort: Pulmonary effort is normal. No respiratory distress.      Breath sounds: No stridor. Rales present. No wheezing.   Abdominal:      Palpations: Abdomen is soft.      Tenderness: There is no abdominal tenderness. There is no guarding or rebound.      Comments: Increase abdominal size according to pt   Musculoskeletal:         General: Swelling present.      Left upper arm: Edema present.      Right forearm: Normal.      Left forearm: Edema present.      Cervical back: Normal range of motion.      Right lower leg: 3+ Edema present.      Left lower  leg: 3+ Edema present.      Comments: Chronic Lymphedema on the left forearm and upper arm   Skin:     General: Skin is warm and dry.      Coloration: Skin is not jaundiced or pale.      Findings: Lesion present.      Comments: Papular lesion on the Left flank   Neurological:      Mental Status: He is alert.         Labs:    Latest Reference Range & Units 06/11/24 10:38 06/11/24 11:04 06/11/24 12:48   WBC 4.8 - 10.8 K/uL  7.7    RBC 4.70 - 6.10 M/uL  4.58 (L)    Hemoglobin 14.0 - 18.0 g/dL  12.0 (L)    Hematocrit 42.0 - 52.0 %  38.1 (L)    MCV 81.4 - 97.8 fL  83.2    MCH 27.0 - 33.0 pg  26.2 (L)    MCHC 32.3 - 36.5 g/dL  31.5 (L)    RDW 35.9 - 50.0 fL  50.4 (H)    Platelet Count 164 - 446 K/uL  72 (L)    Neutrophils-Polys 44.00 - 72.00 %  84.20 (H)    Neutrophils (Absolute) 1.82 - 7.42 K/uL  6.75    Bands-Stabs 0.00 - 10.00 %  3.50    Lymphocytes 22.00 - 41.00 %  2.60 (L)    Lymphs (Absolute) 1.00 - 4.80 K/uL  0.20 (L)    Monocytes 0.00 - 13.40 %  7.00    Monos (Absolute) 0.00 - 0.85 K/uL  0.54    Eosinophils 0.00 - 6.90 %  0.00    Eos (Absolute) 0.00 - 0.51 K/uL  0.00    Basophils 0.00 - 1.80 %  0.90    Baso (Absolute) 0.00 - 0.12 K/uL  0.07    Metamyelocytes %  1.80    Nucleated RBC 0.00 - 0.20 /100 WBC  0.30 (H)    NRBC (Absolute) K/uL  0.02    Plt Estimation   Decreased    Imm. Plt Fraction 0.6 - 13.1 %  13.2 (H)    RBC Morphology   Present    Anisocytosis   1+    Microcytosis   1+    Poikilocytosis   3+    Ovalocytes   1+    Echinocytes   1+    Peripheral Smear Review   see below    Manual Diff Status   PERFORMED    Sodium 135 - 145 mmol/L  135    Potassium 3.6 - 5.5 mmol/L  5.2    Chloride 96 - 112 mmol/L  100    Co2 20 - 33 mmol/L  22    Anion Gap 7.0 - 16.0   13.0    Glucose 65 - 99 mg/dL  174 (H)    Bun 8 - 22 mg/dL  80 (H)    Creatinine 0.50 - 1.40 mg/dL  2.59 (H)    GFR (CKD-EPI) >60 mL/min/1.73 m 2  26 !    Calcium 8.5 - 10.5 mg/dL  9.0    Correct Calcium 8.5 - 10.5 mg/dL  9.5    AST(SGOT) 12 - 45 U/L   19    ALT(SGPT) 2 - 50 U/L  20    Alkaline Phosphatase 30 - 99 U/L  66    Total Bilirubin 0.1 - 1.5 mg/dL  0.3    Albumin 3.2 - 4.9 g/dL  3.4    Total Protein 6.0 - 8.2 g/dL  5.7 (L)    Globulin 1.9 - 3.5 g/dL  2.3    A-G Ratio g/dL  1.5    DX-CHEST-PORTABLE (1 VIEW)    Rpt   EKG  Rpt     (L): Data is abnormally low  (H): Data is abnormally high  !: Data is abnormal  Rpt: View report in Results Review for more information    EKG: Per my read, A-fib     Imaging: CXR-Low lung volumes with bibasilar atelectasis and trace bilateral pleural effusions    Previous Data Review: reviewed    Problem Representation:  Jama is a 68 y.o. 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 He is in need of diuresis and is currently receiving lasix PO for the last few weeks without effect and is in need of IV medication diuresis.    # SANKET and Acute on chronic renal failure  #Fluid overload  Patient was recently admitted on 5/28 for volume overload and he states that many of the symptoms are similar to this instance. His Cr is elevated to 2.59, above baseline of 2 with evidence of volume overload (pitting edema, orthopnea, increased O2 demand).   - Has received 60 mg of IV lasix in the ED. Will reassess urine output, weight, and volume status    - Starting tomorrow: Lasix 40 mg BID  - Strict I/Os  - Fluid restriction 1.5 L  - Sodium restriction   - Orthostatics for hypotension and dizziness   - Fall precautions  - Monitor on telemetry  - Avoid nephrotoxic drugs and NSAIDS  - Nephrology consult, as he has been following with nephrology outpatient    -Recommended adding Metolazone   - Will monitor K and Mg    #Acute  respiratory failure   He has also been receiving PO levofloxacin for acute cough and sputum production. Will hold for now. Has been afebrile and has no WBC elevation.  - Viral panel   - Procal  - 2 view CXR   - Will consider restarting levofloxacin based on results      #Hx of IDDM 2/2 chronic glucocorticoid use  Patient has history of chronic prednisone use for his kidney transplant in 2010  Most recent A1c is 11.2 and today's admission showing glucose within target range of  140-180  Patient is insulin dependent and recently with the help of his GP discontinued all oral diabetes medication to simplify  his medication list. He now only takes 20U of lantus daily.   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 Currently using immunotherapy for treatment.  Last given his immunotherapy last Thursday and received them every 3 weeks    #Hx of persistent A-fib  Currently rate controlled on Metoprolol    #Hx of Kidney transplant  Currently on Sacrolimus and prednisone for immunosuppression

## 2024-06-11 NOTE — ED PROVIDER NOTES
ER Provider Note    Scribed for Shivam Vallejo D.O. by Rick Fajardo. 6/11/2024  12:52 PM    Primary Care Provider: Ganesh Benton III, M.D.    CHIEF COMPLAINT  Chief Complaint   Patient presents with    Lightheadedness     Pt was admitted three weeks ago and has had dizziness/lightheaded since he was discharged. Pt compliant with medications.     Sent by MD     Pt was seen by nephrologist this morning and was told to come to ER due to fluid retention and SOB.     Shortness of Breath     Pt was started on antibiotics yesterday due to productive cough. Pt has hx of pleural effusions without drainage. Pt found to be 89% on RA. No hx of oxygen at home.        HPI/ROS  LIMITATION TO HISTORY   None    OUTSIDE HISTORIAN(S):  Family member was present at bedside and helped provide the history below.     Jama Altman is a 68 y.o. male with a history of squamous cell carcinoma who presents to the Emergency Department for intermittent light-headedness onset this morning. The patient was seeing his nephrologist this morning when he began to feel light-headed, and was advised by the nephrologist to visit the ED, prompting him to present today. He states that this is not the first time he's experienced these symptoms, as he visited the ED for the same issue 3 weeks ago. His light-headedness symptoms are exacerbated when standing. He presents today with novel lower extremity swelling as well. The patient notes that he also has an infection in his esophagus and lungs that is being treated with antibiotics. Additionally, he states that his cancer has spread to his lungs as well. Regarding his squamous cell carcinoma, it is mostly on his left side, and it has radiated to his back and had chemotherapy to that area. He states that is last immune therapy was last Thursday. Dr. León is his radiologist. He denies using supplemental oxygen at home.     ROS as per HPI.    PAST MEDICAL HISTORY  Past Medical History:   Diagnosis  Date    A-fib (HCC)     Arrhythmia     Benign essential hypertension     Diabetes (HCC)     type 2    Heart burn     Hemorrhagic disorder (HCC)     Eliquis    Hyperlipoproteinemia     Hypertension     not on meds anymore    Indigestion     Myocardial infarct (HCC) 2013    stent    Polycystic kidney 09/10/2010    RIGHT KIDNEY TRANSPLANT    Presence of Watchman left atrial appendage closure device 02/29/2024    Sleep apnea 01/30/2024    states he was told he had sleep apnea but has not had a sleep study    Snoring        SURGICAL HISTORY  Past Surgical History:   Procedure Laterality Date    STENT PLACEMENT  2013    cardiac    KNEE MANIPULATION  02/16/2012    Performed by LATOYA CONNER at SUR[facility]ERY [facility]    KNEE UNICOMPARTMENTAL  12/23/2011    Performed by LATOYA CONNER at SURGERY [facility]    KNEE ARTHROSCOPY  12/23/2011    Performed by LATOYA CONNER at SURGERY [facility]    KNEE ARTHROSCOPY  05/03/2011    Performed by HANANE GOLDMAN at SURGERY [facility]    MENISCECTOMY, KNEE, MEDIAL  05/03/2011    Performed by HANANE GOLDMAN at SURGERY [facility]    OTHER  09/10/2010    RIGHT KIDNEY TRANSPLANT    KNEE ARTHROPLASTY TOTAL  01/12/2007    RIGHT    KNEE ARTHROSCOPY  04/10/2006    RIGHT    OTHER ORTHOPEDIC SURGERY  07/08/1974    LEFT KNEE DEBRIDEMENT       FAMILY HISTORY  History reviewed. No pertinent family history.    SOCIAL HISTORY   reports that he has never smoked. He has never been exposed to tobacco smoke. He has never used smokeless tobacco. He reports that he does not drink alcohol and does not use drugs.    CURRENT MEDICATIONS  Previous Medications    ACARBOSE (PRECOSE) 50 MG TAB    TAKE 1 TABLET BY MOUTH THREE TIMES DAILY WITH MEALS    ASPIRIN 81 MG EC TABLET    Take 1 Tablet by mouth every day.    ATORVASTATIN (LIPITOR) 80 MG TABLET    Take 1 Tablet by mouth at bedtime.    BENZONATATE (TESSALON) 200 MG CAPSULE    Take 1 Capsule by mouth 3 times a day as needed  "for Cough.    FUROSEMIDE (LASIX) 40 MG TAB    Take 1 Tablet by mouth every day.    GABAPENTIN (NEURONTIN) 400 MG CAP    Take 1 Capsule by mouth 2 times a day.    GLYBURIDE (DIABETA) 5 MG TAB    TAKE 2 TABLETS BY MOUTH TWICE DAILY WITH MEALS    INSULIN GLARGINE 100 UNIT/ML SOLUTION PEN-INJECTOR INJECTION    Inject 20 Units under the skin every evening.    INSULIN PEN NEEDLE 32 G X 4 MM    Use one pen needle in pen device to inject insulin once daily.    METFORMIN (GLUCOPHAGE) 500 MG TAB    Take 1 Tablet by mouth 2 times a day with meals.    METOPROLOL SR (TOPROL XL) 25 MG TABLET SR 24 HR    Take 1 Tablet by mouth every day. May take additional one tab daily for heart rate sustaining >110 BPM.    METRONIDAZOLE (FLAGYL) 500 MG TAB    Crush 1-2 tablets and apply dirrectly into wound bed daily as needed for odor control.    OMEPRAZOLE (PRILOSEC) 20 MG DELAYED-RELEASE CAPSULE    Take 1 Capsule by mouth every day. Indications: Heartburn    PIOGLITAZONE (ACTOS) 45 MG TAB    TAKE ONE TABLET BY MOUTH ONCE DAILY    POTASSIUM CHLORIDE SA (K-DUR) 10 MEQ TAB CR    Take 1 Tablet by mouth every day.    PREDNISONE (DELTASONE) 20 MG TAB    Take 20 mg by mouth every day.    SIROLIMUS 2 MG TAB    Take 6 mg by mouth every day. 2 mg x 3 tablets = 6 mg    TRADJENTA 5 MG TAB TABLET    TAKE 1 TABLET BY MOUTH EVERY DAY       ALLERGIES  Doxycycline    PHYSICAL EXAM  BP (!) 166/135   Pulse 65   Temp 37.6 °C (99.6 °F) (Temporal)   Resp 18   Ht 1.956 m (6' 5\")   Wt 103 kg (228 lb)   SpO2 94% Comment: RA 89%  BMI 27.04 kg/m²     General: No acute distress.  HENT: Normocephalic, Mucus membranes are moist.   Chest: Lungs have even and unlabored respirations, Clear to auscultation.   Cardiovascular: Regular rate and regular rhythm, No peripheral cyanosis.  Extremity: 3+ edema left lower extremity. Edema to left upper extremity.  Abdomen: Non distended.  Neuro: Awake, Conversive, Able to relay recent events.  Psychiatric: Calm and cooperative. "       INITIAL ASSESSMENT  Patient has a history of renal insufficiency and cancer. He is currently on immunotherapy. He presented with light-headedness and lower extremity edema. He was seen by his nephrology office today and was hypoxic. He was sent in for diuresis. He does take lasix which has not been effective. Lab tests will done for evaluation of worsening renal impairment, and electrolyte imbalance. A chest x-ray will be done for pulmonary edema. In the office his pulse oximetry was 89% on room air. He does not use supplemental oxygen while at home. When on 2L of oxygen, his pulse oximetry is 94%.     ED Observation Status? No; Patient does not meet criteria for ED Observation.     DIAGNOSTIC STUDIES    Labs:   Results for orders placed or performed during the hospital encounter of 06/11/24   CBC with Differential   Result Value Ref Range    WBC 7.7 4.8 - 10.8 K/uL    RBC 4.58 (L) 4.70 - 6.10 M/uL    Hemoglobin 12.0 (L) 14.0 - 18.0 g/dL    Hematocrit 38.1 (L) 42.0 - 52.0 %    MCV 83.2 81.4 - 97.8 fL    MCH 26.2 (L) 27.0 - 33.0 pg    MCHC 31.5 (L) 32.3 - 36.5 g/dL    RDW 50.4 (H) 35.9 - 50.0 fL    Platelet Count 72 (L) 164 - 446 K/uL    Neutrophils-Polys 84.20 (H) 44.00 - 72.00 %    Lymphocytes 2.60 (L) 22.00 - 41.00 %    Monocytes 7.00 0.00 - 13.40 %    Eosinophils 0.00 0.00 - 6.90 %    Basophils 0.90 0.00 - 1.80 %    Nucleated RBC 0.30 (H) 0.00 - 0.20 /100 WBC    Neutrophils (Absolute) 6.75 1.82 - 7.42 K/uL    Lymphs (Absolute) 0.20 (L) 1.00 - 4.80 K/uL    Monos (Absolute) 0.54 0.00 - 0.85 K/uL    Eos (Absolute) 0.00 0.00 - 0.51 K/uL    Baso (Absolute) 0.07 0.00 - 0.12 K/uL    NRBC (Absolute) 0.02 K/uL    Anisocytosis 1+     Microcytosis 1+    Comp Metabolic Panel   Result Value Ref Range    Sodium 135 135 - 145 mmol/L    Potassium 5.2 3.6 - 5.5 mmol/L    Chloride 100 96 - 112 mmol/L    Co2 22 20 - 33 mmol/L    Anion Gap 13.0 7.0 - 16.0    Glucose 174 (H) 65 - 99 mg/dL    Bun 80 (H) 8 - 22 mg/dL     Creatinine 2.59 (H) 0.50 - 1.40 mg/dL    Calcium 9.0 8.5 - 10.5 mg/dL    Correct Calcium 9.5 8.5 - 10.5 mg/dL    AST(SGOT) 19 12 - 45 U/L    ALT(SGPT) 20 2 - 50 U/L    Alkaline Phosphatase 66 30 - 99 U/L    Total Bilirubin 0.3 0.1 - 1.5 mg/dL    Albumin 3.4 3.2 - 4.9 g/dL    Total Protein 5.7 (L) 6.0 - 8.2 g/dL    Globulin 2.3 1.9 - 3.5 g/dL    A-G Ratio 1.5 g/dL   ESTIMATED GFR   Result Value Ref Range    GFR (CKD-EPI) 26 (A) >60 mL/min/1.73 m 2   DIFFERENTIAL MANUAL   Result Value Ref Range    Bands-Stabs 3.50 0.00 - 10.00 %    Metamyelocytes 1.80 %    Manual Diff Status PERFORMED    PERIPHERAL SMEAR REVIEW   Result Value Ref Range    Peripheral Smear Review see below    PLATELET ESTIMATE   Result Value Ref Range    Plt Estimation Decreased    MORPHOLOGY   Result Value Ref Range    RBC Morphology Present     Poikilocytosis 3+     Ovalocytes 1+     Echinocytes 1+    IMMATURE PLT FRACTION   Result Value Ref Range    Imm. Plt Fraction 13.2 (H) 0.6 - 13.1 %   PROCALCITONIN   Result Value Ref Range    Procalcitonin 0.41 (H) <0.25 ng/mL   EKG (NOW)   Result Value Ref Range    Report       [facility] Emergency Dept.    Test Date:  2024  Pt Name:    CIARA FORRESTER                   Department: ER  MRN:        [id number]                      Room:  Gender:     Male                         Technician: [id number]  :        1955                   Requested By:ER TRIAGE PROTOCOL  Order #:    [id number]                    Regulo MD: SHOSHANA COX D.O.    Measurements  Intervals                                Axis  Rate:       95                           P:          0  MI:         0                            QRS:        185  QRSD:       149                          T:          8  QT:         330  QTc:        415    Interpretive Statements  Atrial fibrillation  Nonspecific intraventricular conduction delay  Minimal ST depression  Compared to ECG 06/10/2024 08:05:11  Intraventricular conduction delay  now present  ST (T wave) deviation now present  Right bundle-branch block no longer present  Electronically Signed On 2024 13:10:12 PDT by SHOSHANA COX D.O.          EKG:   I have independently interpreted the above EKG.    Results for orders placed or performed during the hospital encounter of 24   EKG (NOW)   Result Value Ref Range    Report       [facility] Emergency Dept.    Test Date:  2024  Pt Name:    CIARA FORRESTER                   Department: ER  MRN:        [id number]                      Room:  Gender:     Male                         Technician: [id number]  :        1955                   Requested By:ER TRIAGE PROTOCOL  Order #:    [id number]                    Reading MD: SHOSHANA COX D.O.    Measurements  Intervals                                Axis  Rate:       95                           P:          0  ME:         0                            QRS:        185  QRSD:       149                          T:          8  QT:         330  QTc:        415    Interpretive Statements  Atrial fibrillation  Nonspecific intraventricular conduction delay  Minimal ST depression  Compared to ECG 06/10/2024 08:05:11  Intraventricular conduction delay now present  ST (T wave) deviation now present  Right bundle-branch block no longer present  Electronically Signed On 2024 13:10:12 PDT by SHOSHANA COX D.O.          Radiology:   The attending emergency physician has independently interpreted the diagnostic imaging associated with this visit and am waiting the final reading from the radiologist.   Preliminary interpretation is as follows: Bilateral pleural effusion  Radiologist interpretation:   DX-CHEST-PORTABLE (1 VIEW)   Final Result      Hypoinflation with bibasilar atelectasis/pneumonitis and trace/small pleural effusions. No significant change.           COURSE & MEDICAL DECISION MAKING     COURSE AND PLAN  12:52 PM - Patient seen and examined at  bedside. Discussed plan of care, including medications, EKG, imaging, and labs. Patient agrees to the plan of care. The patient will be medicated with Lasix 60 mg injection. Ordered for EKG, DX-chest, CBC with diff, platelet estimate, CMP, peripheral smear view, morphology, differential manual, and immature PLT fraction to evaluate his symptoms.     1:51 PM - I spoke to the [facility] Hospitalist team about the patient's case.     2:21 PM - I spoke to Dr. Casey (Internal Medicine) about the patient's case. The patient will be admitted for hospitalization.     ED Summary: This patient has a history of chronic renal disease, has been putting on weight and has significant leg swelling and is on oral diuretics which are not working.  He was seen by his nephrologist today and was sent in for IV diuresis.  I spoke with the hospitalist for admission and the patient will be admitted for further evaluation and treatment      DISPOSITION AND DISCUSSIONS  I have discussed management of the patient with the following physicians and LINDA's: [facility] Hospitalist Team and Dr. Casey (Internal Medicine)    DISPOSITION:  Patient will be hospitalized by Dr. Casey (Internal Medicine) in guarded condition.     FINAL DIAGNOSIS  1. Hypoxia    2. Bilateral pleural effusion    3. Dependent edema    4. Renal insufficiency        Rick STOCKTON (P[place]), am scribing for, and in the presence of, Shivam Vallejo D.O..    Electronically signed by: Rick Fajardo ([place]), 6/11/2024    Shivam STOCKTON D.O. personally performed the services described in this documentation, as scribed by Rick Fajardo in my presence, and it is both accurate and complete.     The note accurately reflects work and decisions made by me.  Shivam Vallejo D.O.  6/11/2024  7:22 PM

## 2024-06-11 NOTE — ASSESSMENT & PLAN NOTE
Chronic lymphedema on left upper extremity  US LUE 5/4/24 presented No gross evidence of left upper extremity DVT or SVT.   - CTM

## 2024-06-11 NOTE — SENIOR ADMIT NOTE
[facility] Senior Admission Note      HPI  Jama Altman is a 68 y.o. male with a past medical history that includes  atrial fibrillation, status post watchman, coronary disease, renal transplant 2010 on sirolimus and prednisone, polycystic kidney disease, hyperlipidemia, CAD with stent, hypertension, squamous cell carcinoma left axillary region (metastatic, on immunotherapy q3w (last dose last week) with Dr. Fajardo [facility], s/p drain and antibiotics for fungating tumor wound infection), diabetes, prior LUE DVT no longer on anticoagulation (DVT US negative last admission)  who presented on 06/11/24 at the advice of his nephrologist due to his worsening edema despite his oral Lasix as well as dizziness. He was hospitalized here 5/28-5/31 for acute pulmonary edema causing respiratory failure with acute renal failure. Echo showed LVEF 45% with moderate LVH. He was diuresed with IV Lasix with the assistance of nephrology and was transitioned to oral Lasix. He was weaned off oxygen and discharged.     He has been getting dizzy since he was discharged with the Lasix which was a new prescription. The dizziness is worst when he is standing. He has not fallen. He has gained 5 pounds and has been orthopneic since about a month ago. He has also had a productive cough since last week and was started on antibiotics outpatient by his oncologist. Today he saw his nephrologist and due to the swelling and dizziness was recommended to come to the ER.     ED course:   Requiring 2L supplemental oxgyen, /135, vitals otherwise stable. Labs with chronic anemia Hgb 12, chronic thrombocytopenia 72, normal WBC, creatine elevated 2.59 (baseline 1.4-1.5 but labile). CXR with hypoinflation with bibasilar atelectasis/pneumonitis and trace/small pleural effusions but unchanged from prior. He was given 60 mg IV Lasix.      Physical Exam  Constitutional:       General: He is not in acute distress.     Appearance: Normal appearance. He is  ill-appearing. He is not toxic-appearing or diaphoretic.   HENT:      Head: Normocephalic and atraumatic.      Mouth/Throat:      Mouth: Mucous membranes are moist.      Pharynx: Oropharynx is clear.   Eyes:      Extraocular Movements: Extraocular movements intact.   Cardiovascular:      Rate and Rhythm: Normal rate. Rhythm irregular.      Heart sounds: No murmur heard.  Pulmonary:      Effort: Pulmonary effort is normal. No respiratory distress.      Breath sounds: No stridor. Rales present. No wheezing or rhonchi.   Abdominal:      General: Abdomen is flat. There is no distension.      Palpations: Abdomen is soft.      Tenderness: There is no abdominal tenderness.   Musculoskeletal:      Comments: Left arm edematous   Skin:     Comments: Scattered skin tumors below left axilla with bandaging, necrotic looking skin    Neurological:      Mental Status: He is alert.         Assessment  69 YO M with CKD s/p transplant on immunosuppression on immunotherapy for metastatic squamous cell carcinoma of the skin who presented 6/11/24 for volume overload with acute hypoxic respiratory failure, orthopnea, pleural effusions, and pitting edema due to acute on chronic renal failure.     Plan  #Acute hypoxic respiratory failure  Secondary to acute renal failure. Requiring 2L, baseline not supplemental O2 dependent. Crackles on exam. Likely due to volume overload and no evidence of significant heart failure on echo. Does have productive cough and is immunosuppressed so high risk for infection   -IV Lasix BID  -Check viral swab and procal   -2 view CXR to evaluate for PNA  -Wean O2 as able    #Acute on chronic renal failure  History of renal failure s/p transplant on immunosuppression with prednisone likely worsening retention. Failed outpatient oral lasix dose. Volume overloaded with extremity edema and acute hypoxic respiratory failure  -Admission for IV Lasix  -Nephrology consult   -Continue prednisone and sirolimus  -Strict  in/outs, daily standing weights     #Dizziness  Likely orthostatic hypotension  -Check orthostatics   -Fall precautions     Chronic problems-   Please see Dr. Simental's H&P for further plans       Discussed with my attending physician, Dr. Casey

## 2024-06-11 NOTE — ASSESSMENT & PLAN NOTE
Patient has been endorsing shortness of breath and orthopnea.  Hypervolemic on assessment.  Requiring 2 L oxygen at ED.  Chest x-ray presented hypoinflation with bibasilar atelectasis/pneumonitis and small pleural effusion without significant changes compared to 5/28/24 chest x-ray. 2 view CXR with RUL infiltrate, procal elevated 0.4 on admission   Likely due to fluid overload from acute kidney injury, possible pneumonia (high risk due to immunosuppression), less likely acute on chronic heart failure. MRSA nares positive March    (6/13/24) increased oxygen requirement overnight.  Two-view chest x-ray was unremarkable compared to yesterday.  Repeat procalcitonin trending down to 0.34.   (6/14/24) Pro-Randy trending down 0.25.  D-dimer was elevated.  Discussed risk and benefit to start heparin to treat possible pulmonary embolism, patient agreed.  Started heparin  (6/15/24) still require 1.5 L of oxygen same as yesterday.  Discontinued Metolazone. ID switched to Levofloxacin  (6/16/24) Went up to 2L of O2. Pulm was consulted, recommended discontinuing heparin (no concern for PE), recommended continuing Abx and diuresing. CT thorax w/o if his symptom is not improving.  Heparin discontinued  Heparin 6/14 - 16  (6/17/24) Patient feels improvement.   (6/18/24) Patient continue to feel improvement, but BUN increase, per nephrologist Lasix decreased to one dose per day.  Patient is not on obvious fluid overload status.Patient needs 6L oxygen when walking.   (6/19/24) Repeat 2-view CXR still showing fluid overload in the lung, lasix increased to bid 40mg per nephrologist.    Patient only needs 2L oxygen when walking.   (6/20/24) Patient only needs 1L oxygen at resting.   -Transition to oral Lasix 40 mg twice daily starting from 6/13/2024, decreased to 40 mg daily on 6/18/24, and resume to bid on 6/19  -Levofloxacin started 6/16/24 CAP, last dose 6/19  -Sputum culture presented a few gram-positive growth

## 2024-06-11 NOTE — ASSESSMENT & PLAN NOTE
Echocardiogram 5/30/24 presented LVEF of 45% with moderate concentric left ventricular hypertrophy.  Possibly, acute respiratory failure secondary to cardiorenal syndrome or acute on chronic heart failure but less likely   -Same plan as hypervolemia

## 2024-06-11 NOTE — PROGRESS NOTES
Med Rec complete per patient   Allergies reviewed  Antibiotics in the past 30 days:yes  Anticoagulant in past 14 days:no  Pharmacy patient utilizes:[facility] [address]    Pt states he no longer takes any of his diabetic medications only taking Insulin    Pt states he does immunotherapy     Pt is on day 2 out of 5 of Levofloxacin 750 mg

## 2024-06-11 NOTE — CONSULTS
[facility] -  CONSULTATION NOTE               Author: Fish Wilder M.D. 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 Fajardo, DM with last A1c of 6.9%, who presented to hospital for SOB from outpatient nephrology clinic.  Pt also has orthopnea and significant lower extremity edema.  He is currently on sirolimus and pred for his transplant. Scr from 6/5 was 2.89 and his baseline is around 1.5-1.9. Nephrology was asked to consult for SANKET. Scr in the ER was 2.56. He states his SOB has been worsening over the past 3 weeks.     No F/C/N/V/CP/He does have SOB.  No melena, hematochezia, hematemesis.  No HA, visual changes, or abdominal pain. + edema bilateral LE    REVIEW OF SYSTEMS:    10 point ROS was performed and is as per HPI or otherwise negative    PAST MEDICAL HISTORY:   Past Medical History:   Diagnosis Date    A-fib (HCC)     Arrhythmia     Benign essential hypertension     Diabetes (HCC)     type 2    Heart burn     Hemorrhagic disorder (HCC)     Eliquis    Hyperlipoproteinemia     Hypertension     not on meds anymore    Indigestion     Myocardial infarct (HCC) 2013    stent    Polycystic kidney 09/10/2010    RIGHT KIDNEY TRANSPLANT    Presence of Watchman left atrial appendage closure device 02/29/2024    Sleep apnea 01/30/2024    states he was told he had sleep apnea but has not had a sleep study    Snoring        PAST SURGICAL HISTORY:   Past Surgical History:   Procedure Laterality Date    STENT PLACEMENT  2013    cardiac    KNEE MANIPULATION  02/16/2012    Performed by LATOYA CONNER at SURGERY [facility]    KNEE UNICOMPARTMENTAL  12/23/2011    Performed by LATOYA CONNER at SURGERY [facility]    KNEE  "ARTHROSCOPY  12/23/2011    Performed by LATOYA CONNER at SURGERY [facility]    KNEE ARTHROSCOPY  05/03/2011    Performed by HANANE GOLDMAN at SURGERY SAME DAY [facility] ORS    MENISCECTOMY, KNEE, MEDIAL  05/03/2011    Performed by HANANE GOLDMAN at SURGERY SAME DAY [facility] ORS    OTHER  09/10/2010    RIGHT KIDNEY TRANSPLANT    KNEE ARTHROPLASTY TOTAL  01/12/2007    RIGHT    KNEE ARTHROSCOPY  04/10/2006    RIGHT    OTHER ORTHOPEDIC SURGERY  07/08/1974    LEFT KNEE DEBRIDEMENT       FAMILY HISTORY:   History reviewed. No pertinent family history.    SOCIAL HISTORY:   Social History     Tobacco Use   Smoking Status Never    Passive exposure: Never   Smokeless Tobacco Never     Social History     Substance and Sexual Activity   Alcohol Use No     Social History     Substance and Sexual Activity   Drug Use No       HOME MEDICATIONS:   Reviewed and documented in chart    LABORATORY STUDIES:   Recent Labs     06/11/24  1104   SODIUM 135   POTASSIUM 5.2   CHLORIDE 100   CO2 22   GLUCOSE 174*   BUN 80*   CREATININE 2.59*   CALCIUM 9.0       ALLERGIES:  Doxycycline    VS:  BP 97/64   Pulse 97   Temp 37.6 °C (99.6 °F) (Temporal)   Resp 17   Ht 1.956 m (6' 5\")   Wt 103 kg (228 lb)   SpO2 93%   BMI 27.04 kg/m²     Physical Exam  Vitals and nursing note reviewed.   Constitutional:       General: He is not in acute distress.     Appearance: He is obese. He is not ill-appearing.   HENT:      Head: Normocephalic and atraumatic.      Nose: Nose normal.      Mouth/Throat:      Mouth: Mucous membranes are moist.      Pharynx: Oropharynx is clear.   Eyes:      Extraocular Movements: Extraocular movements intact.      Conjunctiva/sclera: Conjunctivae normal.      Pupils: Pupils are equal, round, and reactive to light.   Cardiovascular:      Rate and Rhythm: Normal rate and regular rhythm.   Pulmonary:      Effort: No respiratory distress.      Breath sounds: Rales present.   Abdominal:      General: Abdomen is flat. Bowel " sounds are normal.      Palpations: Abdomen is soft.   Musculoskeletal:      Cervical back: Normal range of motion and neck supple.      Right lower leg: Edema present.      Left lower leg: Edema present.   Neurological:      General: No focal deficit present.      Mental Status: He is alert and oriented to person, place, and time. Mental status is at baseline.   Psychiatric:         Mood and Affect: Mood normal.         Behavior: Behavior normal.         Thought Content: Thought content normal.         Judgment: Judgment normal.         FLUID BALANCE:  No intake/output data recorded.    IMAGING:  All imaging reviewed from admission to present day    IMPRESSION:  # SANKET    - Improving since last Scr of 2.89 from 6/5/24. Baseline Scr is ~1.9  # Renal transplant    - Continue with home IS medications  # h/o ADPKD  # Mild hyperkalemia  # Acute pulmonary edema    - Agree with diuresis - Lasix 40 mg IV BID along with Metolazone 2.5 mg PO BID  # Desaturation to 85% as outpatient  # Acidosis  # Chronic Immunosuppression on medications (sirolimus and prednisone)  # Type 2 DM - last A1c was 6.9%  # Proteinuria  # Atrial fibrillation  # Vit d deficiency  # Squamous Cell Ca - getting immunotherapy/followed by Dr. Fajardo. He has mets to lymph nodes.   # Anemia      PLAN:  - There is no acute need for RRT.   - Creatinine down-trending. He does not want dialysis if it comes to it.   - Agree with continued diuresis with Lasix and Metolazone  - Low salt diet  - Continue with home IS medications  - Dose all meds per eGFR   - Rest of management per primary team      Thank you for the consultation!

## 2024-06-11 NOTE — ED TRIAGE NOTES
"Chief Complaint   Patient presents with    Lightheadedness     Pt was admitted three weeks ago and has had dizziness/lightheaded since he was discharged. Pt compliant with medications.     Sent by MD     Pt was seen by nephrologist this morning and was told to come to ER due to fluid retention and SOB.     Shortness of Breath     Pt was started on antibiotics yesterday due to productive cough. Pt has hx of pleural effusions without drainage. Pt found to be 89% on RA. No hx of oxygen at home.          Pt ambulated to triage for above complaint.  Pt is AO x 4, follows commands, and responds appropriately to questions. Patient's breathing is mildly labored and pain is currently 0/10 on the 0-10 pain scale.  Pt placed in EKG room. Patient educated on triage process and encouraged to alert staff for any changes.    BP (!) 166/135   Pulse 65   Temp 37.6 °C (99.6 °F) (Temporal)   Resp 18   Ht 1.956 m (6' 5\")   Wt 103 kg (228 lb)   SpO2 94%     "

## 2024-06-11 NOTE — ASSESSMENT & PLAN NOTE
Hem A1c 11.2 (5/23/24)  Home glargine 20 units  -Increased glargine to 23 units on 6/17/24  -SSI  -Hypoglycemia protocol

## 2024-06-11 NOTE — ASSESSMENT & PLAN NOTE
Patient endorses 5 pounds weight gain past 3 weeks.  Has been following [facility].  Was sent to ED by recommended from nephrologist due to unresponsive p.o. Lasix.  Patient daily diuresis treatment, has lost 5.5 kg since admission.   - Nephrology consulted, appreciate recommendations  - IV Lasix 40 mg BID, transition to oral Lasix 6/13, decreased to 40 mg oral daily on 6/18 and increased to bid 40mg on 6/19  - Fluid restriction to 1L, low salt diet  - Daily standing weights  - Daily renal panel with mg

## 2024-06-11 NOTE — ASSESSMENT & PLAN NOTE
Endorses worsening dizziness and lightheadedness when he is standing up from sitting up. Negative orthostatics by blood pressure but pulse does increase dramatically with ambulation  Has been tachycardic 160s by standing and walking.  (6/14) walking challenge presented patient only able to walk to the door and has significant dizziness with elevated heart rate 150s-160s.  (6/15) cardiologist consulted for A-fib RVR on exertion.  Recommended titrating metoprolol.  (6/16) HR labile 60 - 100s on resting  (6/17 and 6/18) atrial improves during resting but is still go to as high as 170-180 when ambulating, titration the metoprolol dose.    (6/19 and 6/20) Patient's walking HR is better but still up to 190 when ambulation,  Metoprolol has been increased to 100 mg bid   -Fall risk precaution  -Increase metoprolol SR to 100 mg bid on 6/20/24  -Re consulted Cardiology on 6/20/24

## 2024-06-11 NOTE — PROGRESS NOTES
Brief Wound Care Provider Note    Patient appears to be in ED and likely admitting to hospital. Was not seen in clinic today.

## 2024-06-11 NOTE — ASSESSMENT & PLAN NOTE
Has been followed by outpatient oncologist.   Per patient, he is currently on immunotherapy and last therapy was on last Friday (6/7/24)  - Continue home prednisone and Sirolimus  - Wound consult placed  - Increased gabapentin to 200mg bid to control the pain on 6/20

## 2024-06-11 NOTE — H&P
[facility] Internal Medicine History & Physical Note    Date of Service  6/11/2024    [facility] Team: [facility] IM Green Team   Attending: Charlie Casey M.d.  Senior Resident: Dr. Estrada  Intern:  Dr. Simental  Contact Number: [phone]    Primary Care Physician  Ganesh Benton III, M.D.    Consultants  None    Code Status  Full Code    Chief Complaint  Chief Complaint   Patient presents with    Lightheadedness     Pt was admitted three weeks ago and has had dizziness/lightheaded since he was discharged. Pt compliant with medications.     Sent by MD     Pt was seen by nephrologist this morning and was told to come to ER due to fluid retention and SOB.     Shortness of Breath     Pt was started on antibiotics yesterday due to productive cough. Pt has hx of pleural effusions without drainage. Pt found to be 89% on RA. No hx of oxygen at home.        History of Presenting Illness (HPI):   Jama Altman is a 68 y.o. 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 CAD s/p stent, renal transplant 2010 on chronic immunosuppressant therapy, (hx of polycystic kidney disease), HLD, HTN and PAD who presented 6/11/2024 with lightheadedness, dizziness and shortness of breath.    Patient has been feeling lightheadedness, dizziness and shortness of breath about 3 weeks ago when he was discharged from this hospital.  He also noticed swelling on his legs and abdomen.  He was not able to sleep lying down due to worsening of shortness of breath and has been sleeping sitting up position.  He also developed productive cough with green sputum about a week ago, oncology prescribed antibiotics that has been helping reducing his sputum.  He has been following oncologist for his squamous cell carcinoma on his left chest.  He has been getting immunotherapy, last therapy was last Friday.  He was  admitted about 3 weeks ago due to swelling on his leg and shortness of breath.  He was treated with Lasix and his symptoms got better then he was discharged.  Due to the immunotherapy, patient had medication change for immunosuppressant for his kidney transplant.  He feels lightheaded and dizziness when he stands up from sitting up.  His systolic blood pressure at home range from 106-130. He gained about 5 pounds since 3 weeks ago.  He has been having hard time walking around.  He took his home medication this morning.  He does not use oxygen at home.  His primary care physician recently stopped or diabetic medication except insulin. His nephrologist recommended him to visit emergency room since he was not responding to p.o. Lasix and getting IV diuretics.    At ED, vital signs remarkable for hypertension 166/135 and saturating 94% at 2 L of oxygen but afebrile.  CBC presented chronic normocytic anemia and chronic thrombocytopenia.  CMP was remarkable for elevated BUN of 80 and elevated creatinine of 2.59.  Chest x-ray presented hypoinflation with bibasilar atelectasis/pneumonitis and small pleural effusion without significant changes compared to 5/28/24 chest x-ray.  At ED, patient received IV Lasix of 60 mg.  Patient is admitted for acute on chronic kidney disease and acute hypoxic failure.    I discussed the plan of care with patient.    Review of Systems  Review of Systems   Constitutional:  Negative for chills and fever.   Respiratory:  Positive for cough, sputum production and shortness of breath.    Cardiovascular:  Positive for orthopnea. Negative for chest pain, palpitations and leg swelling.   Gastrointestinal:  Negative for abdominal pain, constipation, diarrhea, heartburn, nausea and vomiting.   Skin:  Positive for rash.        Left axillary region   Neurological:  Positive for dizziness and headaches.       Past Medical History   has a past medical history of A-fib (HCC), Arrhythmia, Benign essential  hypertension, Diabetes (HCC), Heart burn, Hemorrhagic disorder (HCC), Hyperlipoproteinemia, Hypertension, Indigestion, Myocardial infarct (HCC) (2013), Polycystic kidney (09/10/2010), Presence of Watchman left atrial appendage closure device (02/29/2024), Sleep apnea (01/30/2024), and Snoring.    Surgical History   has a past surgical history that includes knee arthroplasty total (01/12/2007); knee arthroscopy (04/10/2006); other orthopedic surgery (07/08/1974); other (09/10/2010); knee arthroscopy (05/03/2011); meniscectomy, knee, medial (05/03/2011); knee unicompartmental (12/23/2011); knee arthroscopy (12/23/2011); knee manipulation (02/16/2012); and stent placement (2013).     Family History  family history is not on file.   Denies any cancer in his family history.  Brother = Diabetes    Social History  Tobacco: Never  Alcohol: 10 years ago when he had kidney transplant  Recreational drugs (illegal or prescription): Denies  Recent Travel: Denies  Primary Care Provider: Christal Benton    Allergies  Allergies   Allergen Reactions    Doxycycline Rash     Sweats and shakes: 9/28/17: Clarified allergy with patient. Allergy was in 1998 and he doesn't remember what happened. He thought the medication is for pain.  Tolerates doxycycline 9/2017       Medications  Prior to Admission Medications   Prescriptions Last Dose Informant Patient Reported? Taking?   Insulin Pen Needle 32 G x 4 mm N/A at N/A Patient No No   Sig: Use one pen needle in pen device to inject insulin once daily.   Sirolimus 2 MG Tab 6/11/2024 at AM Patient Yes Yes   Sig: Take 4 mg by mouth every day. 4 mg = 2 tabs   TRADJENTA 5 MG Tab tablet NO LONGER TAKING at NO LONGER TAKING Patient No No   Sig: TAKE 1 TABLET BY MOUTH EVERY DAY   Patient not taking: Reported on 6/10/2024   acarbose (PRECOSE) 50 MG Tab NO LONGER TAKIN at NO LONGER TAKING Patient No No   Sig: TAKE 1 TABLET BY MOUTH THREE TIMES DAILY WITH MEALS   acetaminophen-codeine #3 (TYLENOL  #3) 300-30 MG Tab 6/10/2024 at PM Patient Yes Yes   Sig: Take 1 Tablet by mouth every evening.   aspirin 81 MG EC tablet 6/10/2024 at PM Patient No Yes   Sig: Take 1 Tablet by mouth every day.   Patient taking differently: Take 81 mg by mouth every evening. Indications: Buildup of Plaques in Large Arteries Leading to the Brain   atorvastatin (LIPITOR) 80 MG tablet 6/10/2024 at PM Patient No Yes   Sig: Take 1 Tablet by mouth at bedtime.   benzonatate (TESSALON) 200 MG capsule 6/11/2024 at AM Patient No Yes   Sig: Take 1 Capsule by mouth 3 times a day as needed for Cough.   fluticasone-salmeterol (WIXELA INHUB) 250-50 MCG/ACT AEROSOL POWDER, BREATH ACTIVATED 6/11/2024 at AM Patient Yes Yes   Sig: Inhale 1 Puff every 12 hours.   furosemide (LASIX) 40 MG Tab 6/11/2024 at AM Patient No Yes   Sig: Take 1 Tablet by mouth every day.   gabapentin (NEURONTIN) 400 MG Cap 6/11/2024 at AM Patient No Yes   Sig: Take 1 Capsule by mouth 2 times a day.   Patient taking differently: Take 800 mg by mouth 2 times a day. 800 mg = 2 tabs   glyBURIDE (DIABETA) 5 MG Tab NO LONGER TAKING at NO LONGER TAKING Patient No No   Sig: TAKE 2 TABLETS BY MOUTH TWICE DAILY WITH MEALS   Patient not taking: Reported on 6/10/2024   insulin glargine 100 UNIT/ML Solution Pen-injector injection 6/10/2024 at PM Patient No Yes   Sig: Inject 20 Units under the skin every evening.   Patient taking differently: Inject 20-21 Units under the skin every evening.   levoFLOXacin (LEVAQUIN) 750 MG tablet 6/11/2024 at AM Patient Yes Yes   Sig: Take 750 mg by mouth every day. for 5 days   metFORMIN (GLUCOPHAGE) 500 MG Tab NO LONGER TAKING at NO LONGER TAKING Patient No No   Sig: Take 1 Tablet by mouth 2 times a day with meals.   Patient not taking: Reported on 6/10/2024   metoprolol SR (TOPROL XL) 25 MG TABLET SR 24 HR 6/11/2024 at AM Patient No Yes   Sig: Take 1 Tablet by mouth every day. May take additional one tab daily for heart rate sustaining >110 BPM.    metroNIDAZOLE (FLAGYL) 500 MG Tab 2 WEEKS AGO at PRN Patient No No   Sig: Crush 1-2 tablets and apply dirrectly into wound bed daily as needed for odor control.   omeprazole (PRILOSEC) 20 MG delayed-release capsule 6/11/2024 at AM Patient No Yes   Sig: Take 1 Capsule by mouth every day. Indications: Heartburn   pioglitazone (ACTOS) 45 MG Tab NO LONGER TAKING at NO LONGER TAKING Patient No No   Sig: TAKE ONE TABLET BY MOUTH ONCE DAILY   Patient not taking: Reported on 6/10/2024   potassium chloride SA (K-DUR) 10 MEQ Tab CR 6/11/2024 at AM Patient No Yes   Sig: Take 1 Tablet by mouth every day.   predniSONE (DELTASONE) 10 MG Tab 6/11/2024 at AM Patient Yes Yes   Sig: Take 20 mg by mouth every day. 20 mg = 2 tabs      Facility-Administered Medications: None       Physical Exam  Temp:  [37.6 °C (99.6 °F)] 37.6 °C (99.6 °F)  Pulse:  [65-98] 97  Resp:  [14-19] 17  BP: ()/() 97/64  SpO2:  [90 %-94 %] 93 %  Blood Pressure : 103/65   Temperature: 37.6 °C (99.6 °F)   Pulse: 96   Respiration: 19   Pulse Oximetry: 92 %       Physical Exam  Constitutional:       General: He is not in acute distress.     Appearance: He is not diaphoretic.   HENT:      Head: Normocephalic and atraumatic.      Mouth/Throat:      Mouth: Mucous membranes are moist.      Pharynx: Oropharynx is clear. No oropharyngeal exudate.   Eyes:      Extraocular Movements: Extraocular movements intact.      Pupils: Pupils are equal, round, and reactive to light.   Cardiovascular:      Rate and Rhythm: Normal rate. Rhythm irregular.      Heart sounds: No murmur heard.  Pulmonary:      Effort: Pulmonary effort is normal. No respiratory distress.      Comments: Mild crackles bilateral basilar  Abdominal:      General: Abdomen is flat. There is distension.      Palpations: Abdomen is soft.      Tenderness: There is no abdominal tenderness. There is no guarding or rebound.   Musculoskeletal:         General: Normal range of motion.      Cervical back:  "Normal range of motion. No rigidity.      Right lower leg: Edema present.      Left lower leg: Edema present.      Comments: +1 pitting edema bilateral lower extremity  Swelling on left upper extremity, patient stated its chronic   Skin:     General: Skin is warm and dry.      Coloration: Skin is not jaundiced.      Findings: Lesion present.      Comments: lesion dressing applied on left axillary region   Neurological:      General: No focal deficit present.      Mental Status: He is alert and oriented to person, place, and time.      Cranial Nerves: No cranial nerve deficit.   Psychiatric:         Mood and Affect: Mood normal.         Behavior: Behavior normal.         Laboratory:  Recent Labs     06/11/24  1104   WBC 7.7   RBC 4.58*   HEMOGLOBIN 12.0*   HEMATOCRIT 38.1*   MCV 83.2   MCH 26.2*   MCHC 31.5*   RDW 50.4*   PLATELETCT 72*     Recent Labs     06/11/24  1104   SODIUM 135   POTASSIUM 5.2   CHLORIDE 100   CO2 22   GLUCOSE 174*   BUN 80*   CREATININE 2.59*   CALCIUM 9.0     Recent Labs     06/11/24  1104   ALTSGPT 20   ASTSGOT 19   ALKPHOSPHAT 66   TBILIRUBIN 0.3   GLUCOSE 174*         No results for input(s): \"NTPROBNP\" in the last 72 hours.      No results for input(s): \"TROPONINT\" in the last 72 hours.    Imaging:  DX-CHEST-PORTABLE (1 VIEW)   Final Result      Hypoinflation with bibasilar atelectasis/pneumonitis and trace/small pleural effusions. No significant change.          X-Ray:  I have personally reviewed the images and compared with prior images.  EKG:  I have personally reviewed the images and compared with prior images.    Assessment/Plan:  Problem Representation:  I anticipate this patient will require at least two midnights for appropriate medical management, necessitating inpatient admission because acute on chronic kidney disease    Patient will need a Telemetry bed on MEDICAL service .  The need is secondary to acute on chronic kidney disease.    * Acute respiratory failure with hypoxia " (HCC)- (present on admission)  Assessment & Plan  Patient has been endorsing shortness of breath and orthopnea.  Hypervolemic on assessment.  Requiring 2 L oxygen at ED.  Chest x-ray presented hypoinflation with bibasilar atelectasis/pneumonitis and small pleural effusion without significant changes compared to 5/28/24 chest x-ray.  Possibly, due to hypervolemic from acute kidney injury, acute on chronic heart failure, cardiorenal syndrome or possible pneumonia.  Received 60 mg IV Lasix at ED  -Admit to the floor  -Tele  - 2 View CXR pending  - IV Lasix 40 mg IV lasix  - Metolazone 2.5mg daily  -Pro-Randy pending  -Based on two-view chest x-ray and procalcitonin, will determine need antibiotics      Hypervolemia  Assessment & Plan  Patient endorses 5 pounds weight gain past 3 weeks.  Has been following [facility] [place].  Was sent to ED by recommended from nephrologist due to unresponsive p.o. Lasix.  Nephrology consulted, appreciate recommendation  - IV Lasix 40 mg IV lasix  - Metolazone 2.5mg daily    Acute on chronic renal insufficiency- (present on admission)  Assessment & Plan  Baseline creatinine 1.7 - 2.0  Nephrology consulted  -Same plan as hypovolemia    Lymphedema on LUE  Assessment & Plan  Chronic lymphedema on left upper extremity  US LUE 5/4/24 presented No gross evidence of left upper extremity DVT or SVT.   - CTM    HFmrEF  Assessment & Plan  Echocardiogram 5/30/24 presented LVEF of 45% with moderate concentric left ventricular hypertrophy.  Possibly, acute respiratory failure secondary to cardiorenal syndrome or acute on chronic heart failure  -Same plan as hypovolemia    Orthostatic dizziness  Assessment & Plan  Endorses worsening dizziness and lightheadedness when he is standing up from sitting up  -Orthostatic blood pressure study once  -Fall risk precaution    Stage 4 squamous cell cancer of left axillary region complicated by left axillary abcscess s/p drainage- (present on admission)  Assessment &  Plan  Has been followed by outpatient oncologist.   Per patient, he is currently on immunotherapy and last therapy was on last Friday (6/7/24)  -Continue home prednisone and Sirolimus  - Wound consult placed  -Continue home gabapentin    A-fib (HCC) s/p Watchman- (present on admission)  Assessment & Plan  - Continue home metoprolol    Type 2 diabetes mellitus (HCC)- (present on admission)  Assessment & Plan  Hem A1c 11.2 (5/23/24)  Home glargine 20 units  -Glargine 10 units  -SSI  -Hypoglycemia protocol      GERD (gastroesophageal reflux disease)- (present on admission)  Assessment & Plan  - Continue home omeprazole    Coronary artery disease s/p stent- (present on admission)  Assessment & Plan  -Continue home aspirin and atorvastatin    Hyperlipidemia- (present on admission)  Assessment & Plan  -Continue home atorvastatin        VTE prophylaxis: heparin ppx

## 2024-06-12 ENCOUNTER — APPOINTMENT (OUTPATIENT)
Dept: RADIOLOGY | Facility: MEDICAL CENTER | Age: 69
DRG: 291 | End: 2024-06-12
Payer: MEDICARE

## 2024-06-12 LAB
ALBUMIN SERPL BCP-MCNC: 3.1 G/DL (ref 3.2–4.9)
ALBUMIN/GLOB SERPL: 1.2 G/DL
ALP SERPL-CCNC: 61 U/L (ref 30–99)
ALT SERPL-CCNC: 18 U/L (ref 2–50)
ANION GAP SERPL CALC-SCNC: 13 MMOL/L (ref 7–16)
AST SERPL-CCNC: 18 U/L (ref 12–45)
BASOPHILS # BLD AUTO: 0.4 % (ref 0–1.8)
BASOPHILS # BLD: 0.02 K/UL (ref 0–0.12)
BILIRUB SERPL-MCNC: 0.3 MG/DL (ref 0.1–1.5)
BUN SERPL-MCNC: 77 MG/DL (ref 8–22)
CALCIUM ALBUM COR SERPL-MCNC: 9.7 MG/DL (ref 8.5–10.5)
CALCIUM SERPL-MCNC: 9 MG/DL (ref 8.5–10.5)
CHLORIDE SERPL-SCNC: 104 MMOL/L (ref 96–112)
CO2 SERPL-SCNC: 21 MMOL/L (ref 20–33)
CREAT SERPL-MCNC: 2.47 MG/DL (ref 0.5–1.4)
EOSINOPHIL # BLD AUTO: 0.02 K/UL (ref 0–0.51)
EOSINOPHIL NFR BLD: 0.4 % (ref 0–6.9)
ERYTHROCYTE [DISTWIDTH] IN BLOOD BY AUTOMATED COUNT: 51.9 FL (ref 35.9–50)
FLUAV RNA SPEC QL NAA+PROBE: NEGATIVE
FLUBV RNA SPEC QL NAA+PROBE: NEGATIVE
GFR SERPLBLD CREATININE-BSD FMLA CKD-EPI: 28 ML/MIN/1.73 M 2
GLOBULIN SER CALC-MCNC: 2.5 G/DL (ref 1.9–3.5)
GLUCOSE BLD STRIP.AUTO-MCNC: 141 MG/DL (ref 65–99)
GLUCOSE BLD STRIP.AUTO-MCNC: 218 MG/DL (ref 65–99)
GLUCOSE BLD STRIP.AUTO-MCNC: 229 MG/DL (ref 65–99)
GLUCOSE SERPL-MCNC: 133 MG/DL (ref 65–99)
GRAM STN SPEC: NORMAL
HCT VFR BLD AUTO: 39.1 % (ref 42–52)
HGB BLD-MCNC: 11.5 G/DL (ref 14–18)
IMM GRANULOCYTES # BLD AUTO: 0.15 K/UL (ref 0–0.11)
IMM GRANULOCYTES NFR BLD AUTO: 2.9 % (ref 0–0.9)
LYMPHOCYTES # BLD AUTO: 0.28 K/UL (ref 1–4.8)
LYMPHOCYTES NFR BLD: 5.4 % (ref 22–41)
MAGNESIUM SERPL-MCNC: 1.9 MG/DL (ref 1.5–2.5)
MCH RBC QN AUTO: 25.6 PG (ref 27–33)
MCHC RBC AUTO-ENTMCNC: 29.4 G/DL (ref 32.3–36.5)
MCV RBC AUTO: 87.1 FL (ref 81.4–97.8)
MONOCYTES # BLD AUTO: 0.9 K/UL (ref 0–0.85)
MONOCYTES NFR BLD AUTO: 17.3 % (ref 0–13.4)
NEUTROPHILS # BLD AUTO: 3.83 K/UL (ref 1.82–7.42)
NEUTROPHILS NFR BLD: 73.6 % (ref 44–72)
NRBC # BLD AUTO: 0 K/UL
NRBC BLD-RTO: 0 /100 WBC (ref 0–0.2)
PHOSPHATE SERPL-MCNC: 3.5 MG/DL (ref 2.5–4.5)
PLATELET # BLD AUTO: 61 K/UL (ref 164–446)
PLATELETS.RETICULATED NFR BLD AUTO: 11.6 % (ref 0.6–13.1)
POTASSIUM SERPL-SCNC: 5.1 MMOL/L (ref 3.6–5.5)
PROT SERPL-MCNC: 5.6 G/DL (ref 6–8.2)
RBC # BLD AUTO: 4.49 M/UL (ref 4.7–6.1)
RSV RNA SPEC QL NAA+PROBE: NEGATIVE
SARS-COV-2 RNA RESP QL NAA+PROBE: NOTDETECTED
SCCMEC + MECA PNL NOSE NAA+PROBE: NEGATIVE
SIGNIFICANT IND 70042: NORMAL
SITE SITE: NORMAL
SODIUM SERPL-SCNC: 138 MMOL/L (ref 135–145)
SOURCE SOURCE: NORMAL
SPECIMEN SOURCE: NORMAL
WBC # BLD AUTO: 5.2 K/UL (ref 4.8–10.8)

## 2024-06-12 PROCEDURE — 97602 WOUND(S) CARE NON-SELECTIVE: CPT

## 2024-06-12 PROCEDURE — 87641 MR-STAPH DNA AMP PROBE: CPT

## 2024-06-12 PROCEDURE — 700102 HCHG RX REV CODE 250 W/ 637 OVERRIDE(OP)

## 2024-06-12 PROCEDURE — 85055 RETICULATED PLATELET ASSAY: CPT

## 2024-06-12 PROCEDURE — 84100 ASSAY OF PHOSPHORUS: CPT

## 2024-06-12 PROCEDURE — 770020 HCHG ROOM/CARE - TELE (206)

## 2024-06-12 PROCEDURE — 85025 COMPLETE CBC W/AUTO DIFF WBC: CPT

## 2024-06-12 PROCEDURE — 700111 HCHG RX REV CODE 636 W/ 250 OVERRIDE (IP)

## 2024-06-12 PROCEDURE — A9270 NON-COVERED ITEM OR SERVICE: HCPCS

## 2024-06-12 PROCEDURE — 36415 COLL VENOUS BLD VENIPUNCTURE: CPT

## 2024-06-12 PROCEDURE — 82962 GLUCOSE BLOOD TEST: CPT | Mod: 91

## 2024-06-12 PROCEDURE — 83735 ASSAY OF MAGNESIUM: CPT

## 2024-06-12 PROCEDURE — 99233 SBSQ HOSP IP/OBS HIGH 50: CPT | Mod: GC | Performed by: INTERNAL MEDICINE

## 2024-06-12 PROCEDURE — 700111 HCHG RX REV CODE 636 W/ 250 OVERRIDE (IP): Mod: JZ

## 2024-06-12 PROCEDURE — 80053 COMPREHEN METABOLIC PANEL: CPT

## 2024-06-12 PROCEDURE — 87205 SMEAR GRAM STAIN: CPT

## 2024-06-12 PROCEDURE — 71046 X-RAY EXAM CHEST 2 VIEWS: CPT

## 2024-06-12 PROCEDURE — 87070 CULTURE OTHR SPECIMN AEROBIC: CPT

## 2024-06-12 RX ORDER — AZITHROMYCIN 250 MG/1
500 TABLET, FILM COATED ORAL DAILY
Status: COMPLETED | OUTPATIENT
Start: 2024-06-12 | End: 2024-06-14

## 2024-06-12 RX ORDER — FUROSEMIDE 10 MG/ML
20 INJECTION INTRAMUSCULAR; INTRAVENOUS ONCE
Status: COMPLETED | OUTPATIENT
Start: 2024-06-12 | End: 2024-06-12

## 2024-06-12 RX ORDER — LANOLIN ALCOHOL/MO/W.PET/CERES
400 CREAM (GRAM) TOPICAL ONCE
Status: COMPLETED | OUTPATIENT
Start: 2024-06-12 | End: 2024-06-12

## 2024-06-12 RX ORDER — AMOXICILLIN AND CLAVULANATE POTASSIUM 875; 125 MG/1; MG/1
1 TABLET, FILM COATED ORAL EVERY 12 HOURS
Status: DISCONTINUED | OUTPATIENT
Start: 2024-06-13 | End: 2024-06-15

## 2024-06-12 RX ORDER — DOXYCYCLINE 100 MG/1
100 TABLET ORAL EVERY 12 HOURS
Status: DISCONTINUED | OUTPATIENT
Start: 2024-06-12 | End: 2024-06-12

## 2024-06-12 RX ORDER — METOLAZONE 2.5 MG/1
2.5 TABLET ORAL
Status: DISCONTINUED | OUTPATIENT
Start: 2024-06-12 | End: 2024-06-14

## 2024-06-12 RX ORDER — ECHINACEA PURPUREA EXTRACT 125 MG
2 TABLET ORAL
Status: DISCONTINUED | OUTPATIENT
Start: 2024-06-12 | End: 2024-06-21 | Stop reason: HOSPADM

## 2024-06-12 RX ADMIN — ACETAMINOPHEN 650 MG: 325 TABLET, FILM COATED ORAL at 05:53

## 2024-06-12 RX ADMIN — INSULIN LISPRO 2 UNITS: 100 INJECTION, SOLUTION INTRAVENOUS; SUBCUTANEOUS at 20:02

## 2024-06-12 RX ADMIN — INSULIN GLARGINE-YFGN 10 UNITS: 100 INJECTION, SOLUTION SUBCUTANEOUS at 20:02

## 2024-06-12 RX ADMIN — BENZONATATE 200 MG: 100 CAPSULE ORAL at 22:17

## 2024-06-12 RX ADMIN — METOLAZONE 2.5 MG: 2.5 TABLET ORAL at 17:33

## 2024-06-12 RX ADMIN — METOLAZONE 2.5 MG: 2.5 TABLET ORAL at 05:31

## 2024-06-12 RX ADMIN — HEPARIN SODIUM 5000 UNITS: 5000 INJECTION, SOLUTION INTRAVENOUS; SUBCUTANEOUS at 05:34

## 2024-06-12 RX ADMIN — OXYCODONE HYDROCHLORIDE 5 MG: 5 TABLET ORAL at 21:08

## 2024-06-12 RX ADMIN — MOMETASONE FUROATE AND FORMOTEROL FUMARATE DIHYDRATE 2 PUFF: 200; 5 AEROSOL RESPIRATORY (INHALATION) at 05:35

## 2024-06-12 RX ADMIN — INSULIN LISPRO 2 UNITS: 100 INJECTION, SOLUTION INTRAVENOUS; SUBCUTANEOUS at 13:29

## 2024-06-12 RX ADMIN — GABAPENTIN 400 MG: 400 CAPSULE ORAL at 21:08

## 2024-06-12 RX ADMIN — FUROSEMIDE 20 MG: 10 INJECTION, SOLUTION INTRAVENOUS at 07:36

## 2024-06-12 RX ADMIN — OMEPRAZOLE 20 MG: 20 CAPSULE, DELAYED RELEASE ORAL at 05:30

## 2024-06-12 RX ADMIN — Medication 2 SPRAY: at 22:45

## 2024-06-12 RX ADMIN — LEVOFLOXACIN 750 MG: 750 TABLET, FILM COATED ORAL at 05:31

## 2024-06-12 RX ADMIN — Medication 400 MG: at 07:35

## 2024-06-12 RX ADMIN — PREDNISONE 20 MG: 20 TABLET ORAL at 05:29

## 2024-06-12 RX ADMIN — FUROSEMIDE 40 MG: 10 INJECTION, SOLUTION INTRAVENOUS at 05:34

## 2024-06-12 RX ADMIN — ASPIRIN 81 MG: 81 TABLET, COATED ORAL at 05:36

## 2024-06-12 RX ADMIN — MOMETASONE FUROATE AND FORMOTEROL FUMARATE DIHYDRATE 2 PUFF: 200; 5 AEROSOL RESPIRATORY (INHALATION) at 17:34

## 2024-06-12 RX ADMIN — GABAPENTIN 400 MG: 400 CAPSULE ORAL at 05:30

## 2024-06-12 RX ADMIN — SIROLIMUS 4 MG: 0.5 TABLET ORAL at 05:32

## 2024-06-12 RX ADMIN — AZITHROMYCIN DIHYDRATE 500 MG: 250 TABLET, FILM COATED ORAL at 16:58

## 2024-06-12 RX ADMIN — METOPROLOL SUCCINATE 25 MG: 25 TABLET, EXTENDED RELEASE ORAL at 05:30

## 2024-06-12 RX ADMIN — ATORVASTATIN CALCIUM 80 MG: 80 TABLET, FILM COATED ORAL at 21:08

## 2024-06-12 RX ADMIN — HEPARIN SODIUM 5000 UNITS: 5000 INJECTION, SOLUTION INTRAVENOUS; SUBCUTANEOUS at 13:29

## 2024-06-12 ASSESSMENT — ENCOUNTER SYMPTOMS
DIARRHEA: 0
SHORTNESS OF BREATH: 0
ORTHOPNEA: 1
COUGH: 1
CONSTIPATION: 0
WHEEZING: 0
CHILLS: 0
ABDOMINAL PAIN: 0
VOMITING: 0
DIZZINESS: 1
FEVER: 0
SPUTUM PRODUCTION: 1
NAUSEA: 0
SHORTNESS OF BREATH: 1

## 2024-06-12 ASSESSMENT — PAIN DESCRIPTION - PAIN TYPE
TYPE: ACUTE PAIN

## 2024-06-12 ASSESSMENT — FIBROSIS 4 INDEX
FIB4 SCORE: 3.95
FIB4 SCORE: 4.66

## 2024-06-12 NOTE — CARE PLAN
The patient is Stable - Low risk of patient condition declining or worsening    Shift Goals  Clinical Goals: Monitor/manage fluid overload, wound care, strict i&o, pain control, comfort  Patient Goals: Wound care x2/day, bed extender, eat due to medications causing isela nausea  Family Goals: Plan of care, comfort, get pt something to eat    Progress made toward(s) clinical / shift goals:    Problem: Knowledge Deficit - Standard  Goal: Patient and family/care givers will demonstrate understanding of plan of care, disease process/condition, diagnostic tests and medications  Description: Target End Date:  1-3 days or as soon as patient condition allows    Document in Patient Education    1.  Patient and family/caregiver oriented to unit, equipment, visitation policy and means for communicating concern  2.  Complete/review Learning Assessment  3.  Assess knowledge level of disease process/condition, treatment plan, diagnostic tests and medications  4.  Explain disease process/condition, treatment plan, diagnostic tests and medications  Outcome: Progressing  Note: Plan of care discussed with patient including need for chest xray, MRSA swab, and new antibiotic. Patient and family educated on concern for possible pneumonia due to prolonged cough and need for o2. Baseline RA for this patient. Notable decrease in lower extremity swelling from the previous day and improved lung sounds.      Problem: Pain - Standard  Goal: Alleviation of pain or a reduction in pain to the patient’s comfort goal  Description: Target End Date:  Prior to discharge or change in level of care    Document on Vitals flowsheet    1.  Document pain using the appropriate pain scale per order or unit policy  2.  Educate and implement non-pharmacologic comfort measures (i.e. relaxation, distraction, massage, cold/heat therapy, etc.)  3.  Pain management medications as ordered  4.  Reassess pain after pain med administration per policy  5.  If opiods  administered assess patient's response to pain medication is appropriate per POSS sedation scale  6.  Follow pain management plan developed in collaboration with patient and interdisciplinary team (including palliative care or pain specialists if applicable)  Outcome: Progressing  Note: Patient reports mild pain in bottom from sitting a lot at home. Wound care completed today with wound care RN, Kat. New orders placed for bedside RN. Patient educated to call staff prior to axilla dressing change for pain medications if he feels necessary.      Problem: Diabetes Management  Goal: Patient will achieve and maintain glucose in satisfactory range  Description: Target End Date:  Prior to discharge or change in level of care    1.  Monitor glucose levels per provider order  2.  Assess for signs and symptoms of hyperglycemia (abdominal pain, bloating, nausea or vomiting)  3.  Assess for signs and symptoms of hypoglycemia (anxiety, tremors, slurred speech, change in LOC, tachycardia, fatigue)  4.  Monitor for early signs and symptoms of infection  5.  Monitor daily weights  6.  Consult to diabetes educator  Outcome: Progressing  Note: Patient and patient fiance Ashely verbalize understanding of blood sugar checks.      Problem: Respiratory  Goal: Patient will achieve/maintain optimum respiratory ventilation and gas exchange  Description: Target End Date:  Prior to discharge or change in level of care    Document on Assessment flowsheet    1.  Assess and monitor rate, rhythm, depth and effort of respiration  2.  Breath sounds assessed qshift and/or as needed  3.  Assess O2 saturation, administer/titrate oxygen as ordered  4.  Position patient for maximum ventilatory efficiency  5.  Turn, cough, and deep breath with splinting to improve effectiveness  6.  Collaborate with RT to administer medication/treatments per order  7.  Encourage use of incentive spirometer and encourage patient to cough after use and utilize splinting  techniques if applicable  8.  Airway suctioning  9.  Monitor sputum production for changes in color, consistency and frequency  10. Perform frequent oral hygiene  11. Alternate physical activity with rest periods  Outcome: Progressing  Note: This RN was able to wean patient down to 1L NC when at rest, however, with he does experience dyspnea with exertion and requires more oxygen when returning back to bed. Patient dropped to 77% and required 2L to be >90%     Problem: Fluid Balance or Risk for Fluid Volume Deficit  Goal: Patient will demonstrate adequate hydration and vital signs  Description: Target End Date:  Prior to discharge or change in level of care    1.  Monitor vital signs - monitor for hypotension and tachycardia, shallow, rapid respirations  2.  Monitor intake and output and report inadequate volumes to provider  3.  Daily weights per provider order  4.  Assess for signs of dehydration - peripheral pulses, capillary refill, color, body temperature, skin turgor, dry skin/mucous membranes  5.  Monitor electrolytes and acid/base balance  6.  Administer IV therapy as ordered  Outcome: Progressing  Note: Patient on fluid restriction of 1,000mL/day. I&Os documented.        Patient is not progressing towards the following goals:

## 2024-06-12 NOTE — DISCHARGE PLANNING
HTH/SCP TCN chart review completed. The most current review of medical record, knowledge of pt's PLOF and social support, LACE+ score of 75, 6 clicks scores of 19 mobility were considered.      Pt seen at bedside. Introduced TCN program. Provided education regarding post acute levels of care. Education provided regarding case management policy for blanket SNF referrals. Discussed HTH/SCP plan benefits. Pt verbalizes understanding.     Pt reports no functional concerns with dc to home once medically cleared. Per chart review and pt report, he remains ambulatory with no AD. He reports no concerns with transportation for dc to home once medically cleared.    Given aforementioned, no additional provider requests and no choice indicated at this time. Current dc planning considerations are anticipated for dc to home with outpatient follow ups as indicated. TCN will continue to follow and collaborate with discharge planning team as additional post acute needs arise. Thank you.     Completed today:  Choice obtained: none indicated currently; see above  SCP with [facility]. discussed possible outpatient transitional PCP follow up if indicated and pt in agreement; sent information to assist in scheduling

## 2024-06-12 NOTE — PROGRESS NOTES
Patient lost IV access.   Patient lost IV access during administration of IV lasix this morning.   Patient received 20 mg (2 ml) of the prescribed the dose prior to IV infiltration.   MD notified.   Currently attempting to obtain ultrasound IV access again.   Another 20 mg IV Lasix dose ordered post IV access.    no known allergies

## 2024-06-12 NOTE — WOUND TEAM
[facility]  Inpatient Services  Initial Wound and Skin Care Evaluation    Admission Date: 6/11/2024     Last order of IP CONSULT TO WOUND CARE was found on 6/11/2024 from Hospital Encounter on 6/11/2024     HPI, PMH, SH: Reviewed    Past Surgical History:   Procedure Laterality Date    STENT PLACEMENT  2013    cardiac    KNEE MANIPULATION  02/16/2012    Performed by LATOYA CONNER at SURGERY [facility]    KNEE UNICOMPARTMENTAL  12/23/2011    Performed by LATOYA CONNER at SURGERY [facility]    KNEE ARTHROSCOPY  12/23/2011    Performed by LATOYA CONNER at SURGERY [facility]    KNEE ARTHROSCOPY  05/03/2011    Performed by HANANE GOLDMAN at SURGERY SAME DAY [facility]    MENISCECTOMY, KNEE, MEDIAL  05/03/2011    Performed by HANANE GOLDMAN at SURGERY SAME DAY [facility]    OTHER  09/10/2010    RIGHT KIDNEY TRANSPLANT    KNEE ARTHROPLASTY TOTAL  01/12/2007    RIGHT    KNEE ARTHROSCOPY  04/10/2006    RIGHT    OTHER ORTHOPEDIC SURGERY  07/08/1974    LEFT KNEE DEBRIDEMENT     Social History     Tobacco Use    Smoking status: Never     Passive exposure: Never    Smokeless tobacco: Never   Substance Use Topics    Alcohol use: No     Chief Complaint   Patient presents with    Lightheadedness     Pt was admitted three weeks ago and has had dizziness/lightheaded since he was discharged. Pt compliant with medications.     Sent by MD     Pt was seen by nephrologist this morning and was told to come to ER due to fluid retention and SOB.     Shortness of Breath     Pt was started on antibiotics yesterday due to productive cough. Pt has hx of pleural effusions without drainage. Pt found to be 89% on RA. No hx of oxygen at home.      Diagnosis: Acute on chronic renal insufficiency [N28.9, N18.9]    Unit where seen by Wound Team: S153/00     WOUND CONSULT RELATED TO:  left axilla; sacrum    WOUND TEAM PLAN OF CARE - Frequency of Follow-up:   Nursing to follow dressing orders written for wound  care. Contact wound team if area fails to progress, deteriorates or with any questions/concerns if something comes up before next scheduled follow up (See below as to whether wound is following and frequency of wound follow up)     3 times weekly - sacrum - cavillon advanced    WOUND HISTORY:   Jama is a 68 y.o. 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 He presented with SANKET and SOB requiring 2L supplemental O2 and was admitted for diuresis.      WOUND ASSESSMENT/LDA  Wound 06/11/24 Soft Tissue Necrosis Axilla Left (Active)   Date First Assessed/Time First Assessed: 06/11/24 2240   Present on Original Admission: Yes  Hand Hygiene Completed: Yes  Primary Wound Type: Soft Tissue Necrosis  Location: Axilla  Laterality: Left      Assessments 6/12/2024  1:00 PM   Site Assessment Pink;Granulation tissue;Yellow;Slough;Fragile;Drainage   Periwound Assessment Satellite lesions;Painful;Fragile;Edema;Pink   Margins Attached edges;Defined edges   Closure Secondary intention   Drainage Amount Moderate   Drainage Description Yellow;Cloudy/turbid   Treatments Cleansed;Irrigation;Nonselective debridement;Site care;Offloading   Wound Cleansing Normal Saline Irrigation   Dressing Status Removed   Dressing Changed Changed   Dressing Cleansing/Solutions Normal Saline   Dressing Options Moist Roll Gauze;Absorbent Abdominal Pad;Hypafix Tape   Dressing Change/Treatment Frequency Every Shift, and As Needed   NEXT Dressing Change/Treatment Date 06/12/24   NEXT Weekly Photo (Inpatient Only)  06/19/24   Wound Team Following Weekly   Non-staged Wound Description Full thickness   Wound Length (cm) 5 cm   Wound Width (cm) 5 cm   Wound Depth (cm) 6 cm   Wound Surface Area (cm^2) 25 cm^2   Wound Volume (cm^3) 150 cm^3   Wound Bed Granulation (%) 50 %   Wound Bed Slough (%) 50 %   Shape irregular oval   Wound Odor Mild   Pulses N/A   WOUND NURSE ONLY - Time Spent with Patient (mins) 45       Wound 06/12/24 Full Thickness Wound Buttocks Bilateral (Active)   Date First Assessed/Time First Assessed: 06/12/24 1300   Present on Original Admission: Yes  Hand Hygiene Completed: Yes  Primary Wound Type: Full Thickness Wound  Location: Buttocks  Laterality: Bilateral      Assessments 6/12/2024  1:00 PM   Wound Image     Site Assessment Red;Pink;Yellow;Fragile;Granulation tissue;Slough;Painful   Periwound Assessment Satellite lesions;Rash;Painful;Fragile;Blanchable erythema;Denuded;Red;Pink   Margins Attached edges;Defined edges   Closure Open to air   Drainage Amount Scant   Drainage Description Serous   Treatments Cleansed;Nonselective debridement;Site care;Offloading   Wound Cleansing CHG   Periwound Protectant Cavilon Advanced   Dressing Status Open to Air   NEXT Weekly Photo (Inpatient Only) 06/19/24   Wound Team Following Bi-Weekly   Non-staged Wound Description Full thickness   Wound Length (cm) 11 cm   Wound Width (cm) 7 cm   Wound Depth (cm) 0.1 cm   Wound Surface Area (cm^2) 77 cm^2   Wound Volume (cm^3) 7.7 cm^3   Wound Bed Granulation (%) 90 %   Wound Bed Slough (%) 10 %   Shape scattered   Wound Odor None   Pulses N/A   WOUND NURSE ONLY - Time Spent with Patient (mins) 45        Vascular:    MIRELLA:   No results found.    Lab Values:    Lab Results   Component Value Date/Time    WBC 5.2 06/12/2024 03:50 AM    RBC 4.49 (L) 06/12/2024 03:50 AM    HEMOGLOBIN 11.5 (L) 06/12/2024 03:50 AM    HEMATOCRIT 39.1 (L) 06/12/2024 03:50 AM    CREACTPROT 13.64 (H) 04/24/2024 03:30 PM    SEDRATEWES 34 (H) 04/24/2024 03:30 PM     HBA1C 11.2 (A) 05/23/2024 03:10 PM         Culture Results show:  No results found for this or any previous visit (from the past 720 hour(s)).    Pain Level/Medicated:  None, Tolerated without pain medication       INTERVENTIONS BY WOUND TEAM:  Chart and images reviewed. Discussed with bedside RN. All areas of concern (based on picture review, LDA review and discussion with bedside RN) have been thoroughly assessed. Documentation of areas based on significant findings. This RN in to assess patient. Performed standard wound care which includes appropriate positioning, dressing removal and non-selective debridement. Pictures and measurements obtained weekly if/when required.    Wound:  Sacrum - soft tissue necrosis  Preparation for Dressing removal: Open to air  Cleansed/Non-selectively Debrided with:  Perineal Wipes (Barrier wipes)  Nadeen wound: Cleansed with Perineal Wipes (Barrier wipes), Prepped with Cavilon Advanced  Primary Dressing:  open to air     Wound:  Left axilla - soft tissue necrosis  Preparation for Dressing removal: Removed without difficulty  Cleansed/Non-selectively Debrided with:  Normal Saline  Nadeen wound: Cleansed with Normal Saline and Gauze, patient refused any topical treatment to periwound  Primary Dressing: normal saline moistened gauze to wound bed  Secondary (Outer) Dressing: ABD pad secured with hypafix tape    Advanced Wound Care Discharge Planning  Number of Clinicians necessary to complete wound care: 1  Is patient requiring IV pain medications for dressing changes:  No   Length of time for dressing change 45 min. (This does not include chart review, pre-medication time, set up, clean up or time spent charting.)    Interdisciplinary consultation: Patient, Bedside RN (Jeni)    EVALUATION / RATIONALE FOR TREATMENT:     Date:  06/12/24  Wound Status:  Initial evaluation    The patient has a chronic full thickness open wound to left axilla, which is extremely sensitive and painful.  The patient changes his dressing at home twice a day at least and requests that only normal saline be used to clean or treat wound bed as anything else stings intolerably. Wet to dry dressing moistened with normal saline gently tucked in to wound bed and covered with ABD pad and secured with hypafix tape.     Sacrum with full thickness soft tissue necrosis, denuded blanching tissue with red granular tissue and yellow slough present. Applied cavillon advanced to protect painful denuded tissue.        Goals: Steady decrease in wound area and depth weekly.    NURSING PLAN OF CARE ORDERS:  Dressing changes: See Dressing Care orders  Skin care: See Skin Care orders    NUTRITION RECOMMENDATIONS   Wound Team Recommendations:  Protein supplements    DIET ORDERS (From admission to next 24h)       Start     Ordered    06/12/24 1318  Diet Order Diet: 2 Gram Sodium; Second Modifier: (optional): Consistent CHO (Diabetic); Fluid modifications: (optional): 1000 ml Fluid Restriction  ALL MEALS        Question Answer Comment   Diet: 2 Gram Sodium    Second Modifier: (optional) Consistent CHO (Diabetic)    Fluid modifications: (optional) 1000 ml Fluid Restriction        06/12/24 1317                    PREVENTATIVE INTERVENTIONS:    Q shift Javad - performed per nursing policy  Q shift pressure point assessments - performed per nursing policy    Surface/Positioning  Standard/trauma mattress - Currently in Place  Bed extender - Ordered    Offloading/Redistribution  Heel offloading dressing (Silicone dressing) - Currently in Place  Float Heels off Bed with Pillows - Currently in Place           Containment/Moisture Prevention    Cavilon advanced - Currently in Place    Anticipated discharge plans:  Self/Family Care        Vac Discharge Needs:  Vac Discharge plan is purely a recommendation from wound team and not a requirement for discharge unless otherwise stated by physician.  Not Applicable Pt not on a wound vac

## 2024-06-12 NOTE — CARE PLAN
The patient is Stable - Low risk of patient condition declining or worsening    Shift Goals  Clinical Goals: monitor/manage fluid overload, strict I/O, wound care, pain mangement, comfort and rest  Patient Goals: wound care, comfort and rest  Family Goals: JUDITH    Progress made toward(s) clinical / shift goals:      Wound care conducted following patient instructions. Photo uploaded into media/patient chart  prior to change this evening.     Bilateral lower extremity edema remains, however, improved this shift compared to previous shift per patient.     Problem: Knowledge Deficit - Standard  Goal: Patient and family/care givers will demonstrate understanding of plan of care, disease process/condition, diagnostic tests and medications  Description: Target End Date:  1-3 days or as soon as patient condition allows    Document in Patient Education    1.  Patient and family/caregiver oriented to unit, equipment, visitation policy and means for communicating concern  2.  Complete/review Learning Assessment  3.  Assess knowledge level of disease process/condition, treatment plan, diagnostic tests and medications  4.  Explain disease process/condition, treatment plan, diagnostic tests and medications  Outcome: Progressing  Note: Patient updated on plan of care, including prescribed medication regimen, blood glucose monitoring, strict measurement of intake and output, as well as continued diuresis, and wound care.   Patient stated understanding to goals of care.      Problem: Pain - Standard  Goal: Alleviation of pain or a reduction in pain to the patient’s comfort goal  Description: Target End Date:  Prior to discharge or change in level of care    Document on Vitals flowsheet    1.  Document pain using the appropriate pain scale per order or unit policy  2.  Educate and implement non-pharmacologic comfort measures (i.e. relaxation, distraction, massage, cold/heat therapy, etc.)  3.  Pain management medications as  ordered  4.  Reassess pain after pain med administration per policy  5.  If opiods administered assess patient's response to pain medication is appropriate per POSS sedation scale  6.  Follow pain management plan developed in collaboration with patient and interdisciplinary team (including palliative care or pain specialists if applicable)  Outcome: Progressing  Note: Patient having complaints of pain post wound dressing change left axilla. Prescribed prn oxycodone given with good relief per patient. No other complaints of pain this shift.      Problem: Respiratory  Goal: Patient will achieve/maintain optimum respiratory ventilation and gas exchange  Description: Target End Date:  Prior to discharge or change in level of care    Document on Assessment flowsheet    1.  Assess and monitor rate, rhythm, depth and effort of respiration  2.  Breath sounds assessed qshift and/or as needed  3.  Assess O2 saturation, administer/titrate oxygen as ordered  4.  Position patient for maximum ventilatory efficiency  5.  Turn, cough, and deep breath with splinting to improve effectiveness  6.  Collaborate with RT to administer medication/treatments per order  7.  Encourage use of incentive spirometer and encourage patient to cough after use and utilize splinting techniques if applicable  8.  Airway suctioning  9.  Monitor sputum production for changes in color, consistency and frequency  10. Perform frequent oral hygiene  11. Alternate physical activity with rest periods  Outcome: Progressing  Flowsheets (Taken 6/11/2024 [id number] by Ivory Hager, C.N.A.)  O2 Delivery Device: Silicone Nasal Cannula  Note: Patient remains on 1.5 L NC with oxygen saturation range 91%  -  95% at rest. Cough noted upon initial assessment, patient stating at times productive. Prescribed inhaler given per orders.   Patient educated on continuation of diuresis and attempt to wean supplemental oxygen.    Humidifier added to supplemental oxygen this  morning to promote respiratory comfort.     Problem: Fluid Balance or Risk for Fluid Volume Deficit  Goal: Patient will demonstrate adequate hydration and vital signs  Description: Target End Date:  Prior to discharge or change in level of care    1.  Monitor vital signs - monitor for hypotension and tachycardia, shallow, rapid respirations  2.  Monitor intake and output and report inadequate volumes to provider  3.  Daily weights per provider order  4.  Assess for signs of dehydration - peripheral pulses, capillary refill, color, body temperature, skin turgor, dry skin/mucous membranes  5.  Monitor electrolytes and acid/base balance  6.  Administer IV therapy as ordered  Outcome: Progressing     Problem: Urinary Elimination:  Goal: Ability to achieve and maintain adequate renal perfusion and functioning will improve  Outcome: Progressing  Goal: Ability to achieve a balanced intake and output will improve  Outcome: Progressing  Note: Hat in toilet to measure output accurately. *see flowsheets for output this shift       Patient is not progressing towards the following goals:

## 2024-06-12 NOTE — PROGRESS NOTES
"4 Eyes Skin Assessment Completed by LAN Dc and LAN Ramirez.    Head WDL  Ears WDL  Nose WDL  Mouth WDL  Neck WDL  Breast/Chest Redness, Bruising, and Discoloration  At this time patient did not want nursing to remove old dressing. Picture taken of the dressing. Patient does his own wound care twice a day and prefers to have wound care completed later this evening. Patient completed this morning change. Understanding of importance of pictures/documentation. Patient is agreeable to let nightshift RN take photos while completing wound care this evening. Patient states, \"I will definitely help with tonight's wound care\" DIP at this time.  Shoulder Blades WDL  Spine WDL  (R) Arm/Elbow/Hand Redness, Bruising, and Discoloration- due to other arm not being accessible, patient has lots of bruising from continuous lab draws. IV completed using ultrasound guidance.  (L) Arm/Elbow/Hand Redness, Bruising, Discoloration, and Edema, scab L thumb by nail  Abdomen WDL  Groin Redness  Scrotum/Coccyx/Buttocks Redness, Non-Blanching, and Excoriation, moisture related  (R) Leg Bruising and Edema, dry, flaky  (L) Leg Bruising and Edema, dry, flaky   (R) Heel/Foot/Toe dry flaky  (L) Heel/Foot/Toe dry flaky           Devices In Places Tele Box, Pulse Ox, and Nasal Cannula      Interventions In Place Gray Ear Foams and Pillows    Possible Skin Injury Yes    Pictures Uploaded Into Epic Yes-- more photos to be uploaded this evening  Wound Consult Placed Yes  RN Wound Prevention Protocol Ordered No  "

## 2024-06-12 NOTE — PROGRESS NOTES
"[facility] -  PROGRESS NOTE               Author: Fish Wilder M.D. 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 Fajardo, DM with last A1c of 6.9%, who presented to hospital for SOB from outpatient nephrology clinic.  Pt also has orthopnea and significant lower extremity edema.  He is currently on sirolimus and pred for his transplant. Scr from 6/5 was 2.89 and his baseline is around 1.5-1.9. Nephrology was asked to consult for SANKET. Scr in the ER was 2.56. He states his SOB has been worsening over the past 3 weeks.      No F/C/N/V/CP/He does have SOB.  No melena, hematochezia, hematemesis.  No HA, visual changes, or abdominal pain. + edema bilateral LE    DAILY NEPHROLOGY SUMMARY:  6/12 - No new overnight renal events. Scr is 2.47 today. Diuresing well.     REVIEW OF SYSTEMS:    10 point ROS reviewed and is as per HPI/daily summary or otherwise negative    PMH/PSH/SH/FH:   Reviewed and unchanged since admission note    CURRENT MEDICATIONS:   Reviewed from admission to present day    VS:  BP 93/52   Pulse 63   Temp 36.3 °C (97.3 °F) (Temporal)   Resp 18   Ht 1.981 m (6' 6\")   Wt 102 kg (224 lb 13.9 oz)   SpO2 95%   BMI 25.99 kg/m²     Physical Exam  Vitals and nursing note reviewed.     Constitutional:       General: He is not in acute distress.     Appearance: He is obese. He is not ill-appearing.   HENT:      Head: Normocephalic and atraumatic.      Nose: Nose normal.      Mouth/Throat:      Mouth: Mucous membranes are moist.      Pharynx: Oropharynx is clear.   Eyes:      Extraocular Movements: Extraocular movements intact.      Conjunctiva/sclera: Conjunctivae normal.      Pupils: Pupils are equal, round, and reactive to light.   Cardiovascular:      Rate and Rhythm: Normal rate and regular rhythm. "   Pulmonary:      Effort: No respiratory distress.      Breath sounds: minimal tanya crackles present.   Abdominal:      General: Abdomen is flat. Bowel sounds are normal.      Palpations: Abdomen is soft.   Musculoskeletal:      Cervical back: Normal range of motion and neck supple.      Right lower leg: Edema present.      Left lower leg: Edema present.   Neurological:      General: No focal deficit present.      Mental Status: He is alert and oriented to person, place, and time. Mental status is at baseline.   Psychiatric:         Mood and Affect: Mood normal.         Behavior: Behavior normal.         Thought Content: Thought content normal.         Judgment: Judgment normal.     Fluids:  In: 600 [P.O.:600]  Out: 1700     LABS:  Recent Labs     06/11/24  1104 06/12/24  0350   SODIUM 135 138   POTASSIUM 5.2 5.1   CHLORIDE 100 104   CO2 22 21   GLUCOSE 174* 133*   BUN 80* 77*   CREATININE 2.59* 2.47*   CALCIUM 9.0 9.0       IMAGING:   All imaging reviewed from admission to present day    IMPRESSION:  # SANKET    - Improving since last Scr of 2.89 from 6/5/24. Baseline Scr is ~1.9  # Renal transplant    - Continue with home IS medications  # h/o ADPKD  # Mild hyperkalemia  # Acute pulmonary edema    - Agree with diuresis - Lasix 40 mg IV BID along with Metolazone 2.5 mg PO BID  # Desaturation to 85% as outpatient  # Acidosis  # Chronic Immunosuppression on medications (sirolimus and prednisone)  # Type 2 DM - last A1c was 6.9%  # Proteinuria  # Atrial fibrillation  # Vit d deficiency  # Squamous Cell Ca - getting immunotherapy/followed by Dr. Fajardo. He has mets to lymph nodes.   # Anemia      PLAN:  - There is no acute need for RRT.   - Creatinine down-trending. He does not want dialysis if it comes to it.   - Continue diuresis with Lasix and Metolazone and switch to PO furosemide tomorrow.  - Low salt diet  - Continue with home IS medications  - Dose all meds per eGFR   - Rest of management per primary team

## 2024-06-12 NOTE — CARE PLAN
The patient is Stable - Low risk of patient condition declining or worsening    Shift Goals  Clinical Goals: Monitor/manage fluid overload, monitor/manage o2 demand, improve skin integrity  Patient Goals: Wound care this evening, eat something, get a bed extend  Family Goals: Bed extender    Progress made toward(s) clinical / shift goals:    Problem: Knowledge Deficit - Standard  Goal: Patient and family/care givers will demonstrate understanding of plan of care, disease process/condition, diagnostic tests and medications  Description: Target End Date:  1-3 days or as soon as patient condition allows    Document in Patient Education    1.  Patient and family/caregiver oriented to unit, equipment, visitation policy and means for communicating concern  2.  Complete/review Learning Assessment  3.  Assess knowledge level of disease process/condition, treatment plan, diagnostic tests and medications  4.  Explain disease process/condition, treatment plan, diagnostic tests and medications  Outcome: Progressing  Note: Discussed plan of care with this patient and his wife. Patient also met with with nephrology and MD on the unit. Assessment complete and medications discussed. Patient scored as low fall risk. Educated to call if needing help with ambulaiton      Problem: Diabetes Management  Goal: Patient will achieve and maintain glucose in satisfactory range  Description: Target End Date:  Prior to discharge or change in level of care    1.  Monitor glucose levels per provider order  2.  Assess for signs and symptoms of hyperglycemia (abdominal pain, bloating, nausea or vomiting)  3.  Assess for signs and symptoms of hypoglycemia (anxiety, tremors, slurred speech, change in LOC, tachycardia, fatigue)  4.  Monitor for early signs and symptoms of infection  5.  Monitor daily weights  6.  Consult to diabetes educator  Outcome: Progressing  Note: Blood sugar checks ACHS     Problem: Skin Integrity - Diabetes  Goal: Patient's  skin on legs and feet will remain intact while hospitalized  Description: Target End Date:  Prior to discharge or change in level of care    1. Examine lower extremities for skin lesions  2. Neurovascular assessment of lower extremities including sensation, temperature of skin, capillary refill  3. Wash feet daily with mild soap and water  Outcome: Progressing  Note: Wound consult placed. Skin note completed with Ashley MONTENEGRO. Pictures uploaded. Nightshift to take photos when wound care completed this evening     Problem: Respiratory  Goal: Patient will achieve/maintain optimum respiratory ventilation and gas exchange  Description: Target End Date:  Prior to discharge or change in level of care    Document on Assessment flowsheet    1.  Assess and monitor rate, rhythm, depth and effort of respiration  2.  Breath sounds assessed qshift and/or as needed  3.  Assess O2 saturation, administer/titrate oxygen as ordered  4.  Position patient for maximum ventilatory efficiency  5.  Turn, cough, and deep breath with splinting to improve effectiveness  6.  Collaborate with RT to administer medication/treatments per order  7.  Encourage use of incentive spirometer and encourage patient to cough after use and utilize splinting techniques if applicable  8.  Airway suctioning  9.  Monitor sputum production for changes in color, consistency and frequency  10. Perform frequent oral hygiene  11. Alternate physical activity with rest periods  Outcome: Progressing   Patient started on IV lasix to help improve volume overload. Patient educated to urinate in to the hat in order for staff to document I&Os. Patient requiring 2L at this time, will continue to wean    Patient is not progressing towards the following goals:

## 2024-06-12 NOTE — PROGRESS NOTES
[facility] Internal Medicine Daily Progress Note    Date of Service  6/12/2024    [facility] Team: [facility]   Attending: Charlie Casey M.d.  Senior Resident: Dr. Teresa Estrada  Intern:  Dr. Prince Simental   Contact Number: [phone]    Chief Complaint  Jama Altman is a 68 y.o. male admitted 6/11/2024 with leg swelling and dizziness    Hospital Course  No notes on file    Interval Problem Update  NAEO. Creatinine downtrending today. Urinated 1700cc overnight. O2 demands decreased to 1.5L NC from 2L. Orthostatics negative from BP standpoint, pulse not recorded however per nursing his pulse spikes to the 160s when ambulating. 2-view CXR with infiltrate RUL. Cough still productive, sending sputum culture.     I have discussed this patient's plan of care and discharge plan at IDT rounds today with Case Management, Nursing, Nursing leadership, and other members of the IDT team.    Consultants/Specialty  nephrology- Dr. Wilder     Code Status  Full Code    Disposition  The patient is not medically cleared for discharge to home or a post-acute facility.      I have placed the appropriate orders for post-discharge needs.    Review of Systems  Review of Systems   Constitutional:  Negative for chills and fever.   Respiratory:  Positive for cough and sputum production. Negative for shortness of breath and wheezing.    Cardiovascular:  Positive for orthopnea and leg swelling. Negative for chest pain.   Neurological:  Positive for dizziness.        Physical Exam  Temp:  [36.1 °C (97 °F)-36.3 °C (97.3 °F)] 36.3 °C (97.3 °F)  Pulse:  [60-97] 80  Resp:  [14-20] 18  BP: ()/(52-72) 110/71  SpO2:  [91 %-95 %] 93 %    Physical Exam  Vitals reviewed.   Constitutional:       General: He is not in acute distress.     Appearance: He is not ill-appearing, toxic-appearing or diaphoretic.   HENT:      Head: Normocephalic and atraumatic.      Mouth/Throat:      Mouth: Mucous membranes are moist.      Pharynx: Oropharynx is clear. No  oropharyngeal exudate.   Eyes:      Extraocular Movements: Extraocular movements intact.      Pupils: Pupils are equal, round, and reactive to light.   Cardiovascular:      Rate and Rhythm: Normal rate. Rhythm irregular.      Heart sounds: No murmur heard.  Pulmonary:      Effort: Pulmonary effort is normal. No respiratory distress.      Comments: Trace crackles bilateral basilar  Abdominal:      General: Abdomen is flat. There is distension.      Palpations: Abdomen is soft.      Tenderness: There is no abdominal tenderness. There is no guarding or rebound.   Musculoskeletal:         General: Normal range of motion.      Cervical back: Normal range of motion. No rigidity.      Right lower leg: Edema present.      Left lower leg: Edema present.      Comments: +1 pitting edema bilateral lower extremity  Swelling on left upper extremity, patient stated its chronic   Skin:     General: Skin is warm and dry.      Coloration: Skin is not jaundiced.      Findings: Lesion present.      Comments: lesion dressing applied on left axillary region  Metastatic cancer with necrosis   Neurological:      General: No focal deficit present.      Mental Status: He is alert and oriented to person, place, and time.      Cranial Nerves: No cranial nerve deficit.   Psychiatric:         Mood and Affect: Mood normal.         Behavior: Behavior normal.         Fluids    Intake/Output Summary (Last 24 hours) at 6/12/2024 1319  Last data filed at 6/12/2024 1200  Gross per 24 hour   Intake 1040 ml   Output 2300 ml   Net -1260 ml       Laboratory  Recent Labs     06/11/24  1104 06/12/24  0350   WBC 7.7 5.2   RBC 4.58* 4.49*   HEMOGLOBIN 12.0* 11.5*   HEMATOCRIT 38.1* 39.1*   MCV 83.2 87.1   MCH 26.2* 25.6*   MCHC 31.5* 29.4*   RDW 50.4* 51.9*   PLATELETCT 72* 61*     Recent Labs     06/11/24  1104 06/12/24  0350   SODIUM 135 138   POTASSIUM 5.2 5.1   CHLORIDE 100 104   CO2 22 21   GLUCOSE 174* 133*   BUN 80* 77*   CREATININE 2.59* 2.47*    CALCIUM 9.0 9.0                   Imaging  DX-CHEST-2 VIEWS   Final Result      Small effusions with bibasilar atelectasis/infiltrate. There is also mildly increased right upper lobe infiltrate.      DX-CHEST-PORTABLE (1 VIEW)   Final Result      Hypoinflation with bibasilar atelectasis/pneumonitis and trace/small pleural effusions. No significant change.            Assessment/Plan  Problem Representation:    * Acute respiratory failure with hypoxia (HCC)- (present on admission)  Assessment & Plan  Patient has been endorsing shortness of breath and orthopnea.  Hypervolemic on assessment.  Requiring 2 L oxygen at ED.  Chest x-ray presented hypoinflation with bibasilar atelectasis/pneumonitis and small pleural effusion without significant changes compared to 5/28/24 chest x-ray. 2 view CXR with RUL infiltrate, procal elevated 0.4 on admission   Likely due to fluid overload from acute kidney injury, possible pneumonia (high risk due to immunosuppression), less likely acute on chronic heart failure. MRSA nares positive March    - IV Lasix 40 mg IV lasix  - Metolazone 2.5mg BID  -Augmentin and doxycycline for possible CAP  -Sputum culture  -repeat procal 6/13  -If no improvement in oxygenation despite achieving euvolemia may broaden antibiotics       Hypervolemia  Assessment & Plan  Patient endorses 5 pounds weight gain past 3 weeks.  Has been following [facility].  Was sent to ED by recommended from nephrologist due to unresponsive p.o. Lasix.  -Nephrology consulted, see notes for further details but in short  - IV Lasix 40 mg BID, transition to oral Lasix 6/13  - Metolazone 2.5mg BID  -Fluid restriction to 1L, low salt diet  -Daily standing weights  -Daily renal panel with mag     Acute on chronic renal insufficiency- (present on admission)  Assessment & Plan  Baseline creatinine 1.7 - 2.0  Nephrology consulted  -Same plan as hypervolemia    Lymphedema on LUE  Assessment & Plan  Chronic lymphedema on  left upper extremity  US LUE 5/4/24 presented No gross evidence of left upper extremity DVT or SVT.   - CTM    HFmrEF  Assessment & Plan  Echocardiogram 5/30/24 presented LVEF of 45% with moderate concentric left ventricular hypertrophy.  Possibly, acute respiratory failure secondary to cardiorenal syndrome or acute on chronic heart failure but less likely   -Same plan as hypervolemia    Orthostatic dizziness  Assessment & Plan  Endorses worsening dizziness and lightheadedness when he is standing up from sitting up. Negative orthostatics by blood pressure but pulse does increase dramatically with ambulation  -Fall risk precaution    Stage 4 squamous cell cancer of left axillary region complicated by left axillary abcscess s/p drainage- (present on admission)  Assessment & Plan  Has been followed by outpatient oncologist.   Per patient, he is currently on immunotherapy and last therapy was on last Friday (6/7/24)  -Continue home prednisone and Sirolimus  - Wound consult placed  -Continue home gabapentin    A-fib (HCC) s/p Watchman- (present on admission)  Assessment & Plan  - Continue home metoprolol  - Telemetry monitoring     Type 2 diabetes mellitus (HCC)- (present on admission)  Assessment & Plan  Hem A1c 11.2 (5/23/24)  Home glargine 20 units  -Glargine 10 units  -SSI  -Hypoglycemia protocol      GERD (gastroesophageal reflux disease)- (present on admission)  Assessment & Plan  - Continue home omeprazole    Coronary artery disease s/p stent- (present on admission)  Assessment & Plan  -Continue home aspirin and atorvastatin    Hyperlipidemia- (present on admission)  Assessment & Plan  -Continue home atorvastatin         VTE prophylaxis: heparin ppx    I have performed a physical exam and reviewed and updated ROS and Plan today (6/12/2024). In review of yesterday's note (6/11/2024), there are no changes except as documented above.

## 2024-06-12 NOTE — PROGRESS NOTES
Daily Progress Note:     Date of Service: 6/12/2024  Primary Team: [facility] Green Team and [facility] Purple Team   Attending: Charlie Casey M.D.   Senior Resident: Dr. Estrada  Intern: Dr. Simental  Contact:  [phone]    Chief Complaint:   Jama is a 68 y.o. 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 He presented with SANKET and SOB requiring 2L supplemental O2 and was admitted for diuresis.    Subjective: patient is feeling better today with swelling in his legs has started to go down with some mild lessening of abdomen swelling. He says that his dizziness when getting up from a seated position has decreased but is still present. His SOB has also lessened but laying down is still difficult.    Consultants/Specialty:  Nephrology    Review of Systems:  Review of Systems   Constitutional:  Negative for chills and fever.   Respiratory:  Positive for cough, sputum production and shortness of breath.    Cardiovascular:  Positive for orthopnea and leg swelling. Negative for chest pain.   Gastrointestinal:  Negative for abdominal pain, constipation, diarrhea, nausea and vomiting.   Neurological:  Positive for dizziness.       Objective Data:   Physical Exam:   Vitals:   Temp:  [36.1 °C (97 °F)-36.3 °C (97.3 °F)] 36.3 °C (97.3 °F)  Pulse:  [60-98] 63  Resp:  [14-20] 18  BP: ()/(52-74) 93/52  SpO2:  [90 %-95 %] 95 % on 1.5 L NC     Physical Exam  Constitutional:       General: He is not in acute distress.     Appearance: He is not ill-appearing.      Comments: Appears improved and  less fatigued   HENT:      Head: Normocephalic and atraumatic.      Nose: Nose normal.   Eyes:      Extraocular Movements: Extraocular movements intact.      Pupils: Pupils are equal, round, and reactive to light.   Cardiovascular:      Rate and Rhythm: Normal rate and regular rhythm.      Pulses: Normal pulses.      Heart sounds: No murmur heard.     No friction rub. No gallop.   Pulmonary:      Effort: Pulmonary effort is normal.      Breath sounds: Normal breath sounds. No rales.   Abdominal:      General: Bowel sounds are normal. There is distension.      Palpations: Abdomen is soft.      Tenderness: There is no abdominal tenderness. There is no guarding or rebound.   Musculoskeletal:      Cervical back: Normal range of motion.      Right lower leg: Edema present.      Left lower leg: Edema present.   Skin:     General: Skin is warm and dry.      Capillary Refill: Capillary refill takes less than 2 seconds.      Findings: Lesion present.      Comments: Left flank lesions   Neurological:      Mental Status: He is alert and oriented to person, place, and time.           Labs:    Latest Reference Range & Units 06/11/24 11:04 06/12/24 03:50   WBC 4.8 - 10.8 K/uL 7.7 5.2   RBC 4.70 - 6.10 M/uL 4.58 (L) 4.49 (L)   Hemoglobin 14.0 - 18.0 g/dL 12.0 (L) 11.5 (L)   Hematocrit 42.0 - 52.0 % 38.1 (L) 39.1 (L)   MCV 81.4 - 97.8 fL 83.2 87.1   MCH 27.0 - 33.0 pg 26.2 (L) 25.6 (L)   MCHC 32.3 - 36.5 g/dL 31.5 (L) 29.4 (L)   RDW 35.9 - 50.0 fL 50.4 (H) 51.9 (H)   Platelet Count 164 - 446 K/uL 72 (L) 61 (L)   Neutrophils-Polys 44.00 - 72.00 % 84.20 (H) 73.60 (H)   Neutrophils (Absolute) 1.82 - 7.42 K/uL 6.75 3.83   Bands-Stabs 0.00 - 10.00 % 3.50    Lymphocytes 22.00 - 41.00 % 2.60 (L) 5.40 (L)   Lymphs (Absolute) 1.00 - 4.80 K/uL 0.20 (L) 0.28 (L)   Monocytes 0.00 - 13.40 % 7.00 17.30 (H)   Monos (Absolute) 0.00 - 0.85 K/uL 0.54 0.90 (H)   Eosinophils 0.00 - 6.90 % 0.00 0.40   Eos (Absolute) 0.00 - 0.51 K/uL 0.00 0.02    Basophils 0.00 - 1.80 % 0.90 0.40   Baso (Absolute) 0.00 - 0.12 K/uL 0.07 0.02   Metamyelocytes % 1.80    Immature Granulocytes 0.00 - 0.90 %  2.90 (H)   Immature Granulocytes (abs) 0.00 - 0.11 K/uL  0.15 (H)   Nucleated RBC 0.00 - 0.20 /100 WBC 0.30 (H) 0.00   NRBC (Absolute) K/uL 0.02 0.00   Plt Estimation  Decreased    Imm. Plt Fraction 0.6 - 13.1 % 13.2 (H) 11.6   RBC Morphology  Present    Anisocytosis  1+    Microcytosis  1+    Poikilocytosis  3+    Ovalocytes  1+    Echinocytes  1+    Peripheral Smear Review  see below    Manual Diff Status  PERFORMED    Sodium 135 - 145 mmol/L 135 138   Potassium 3.6 - 5.5 mmol/L 5.2 5.1   Chloride 96 - 112 mmol/L 100 104   Co2 20 - 33 mmol/L 22 21   Anion Gap 7.0 - 16.0  13.0 13.0   Glucose 65 - 99 mg/dL 174 (H) 133 (H)   Bun 8 - 22 mg/dL 80 (H) 77 (H)   Creatinine 0.50 - 1.40 mg/dL 2.59 (H) 2.47 (H)   GFR (CKD-EPI) >60 mL/min/1.73 m 2 26 ! 28 !   Calcium 8.5 - 10.5 mg/dL 9.0 9.0   Correct Calcium 8.5 - 10.5 mg/dL 9.5 9.7   AST(SGOT) 12 - 45 U/L 19 18   ALT(SGPT) 2 - 50 U/L 20 18   Alkaline Phosphatase 30 - 99 U/L 66 61   Total Bilirubin 0.1 - 1.5 mg/dL 0.3 0.3   Albumin 3.2 - 4.9 g/dL 3.4 3.1 (L)   Total Protein 6.0 - 8.2 g/dL 5.7 (L) 5.6 (L)   Globulin 1.9 - 3.5 g/dL 2.3 2.5   A-G Ratio g/dL 1.5 1.2   Phosphorus 2.5 - 4.5 mg/dL  3.5   Magnesium 1.5 - 2.5 mg/dL  1.9   Procalcitonin <0.25 ng/mL 0.41 (H)    (L): Data is abnormally low  (H): Data is abnormally high  !: Data is abnormal    Imaging:   CXR on 6/11: Hypoinflation with bibasilar atelectasis/pneumonitis and trace/small pleural effusions. No significant change from prior cxr.    Problem Representation: Jama is a 68 y.o. 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 His procal was elevated and has been responding to diuresis plan recommended by nephrologist. He continues to need diuresis and treatment for CAP with levofloxacin due to increased oxygen demands.    # SANKET and Acute on chronic renal failure  #Fluid overload  Patient was recently admitted on 5/28 for volume overload and he states that many of the symptoms are similar to this instance. His Cr is elevated to 2.59, above baseline of 2 with evidence of volume overload (pitting edema, orthopnea, increased O2 demand).   Patient continues to be volume overloaded with mild improvement. Pitting Edema and SOB persist.  Cr on 6/12:  2.47  - Has received 60 mg of IV lasix in the ED on 6/11.           - Lasix 40 mg BID D  - Metolazone 2.5 mg D (hold for <105 SBP)  - Strict I/Os  - Fluid restriction 1 L  - Sodium restriction   - Orthostatics for hypotension and dizziness              - Fall precautions  - Monitor on telemetry  - Avoid nephrotoxic drugs and NSAIDS, adjust medication on daily CMP GFR  - Nephrology following             No current need for dialysis  - Will monitor K and Mg     #Acute respiratory failure   He has also been receiving PO levofloxacin for acute cough and sputum production. Will hold for now. Has been afebrile and has no WBC elevation.  2 view CXR: Small effusions with bibasilar atelectasis/infiltrate. There is also mildly increased right upper lobe infiltrate.   - Levofloxacin 750 mg D (6/10-6/14) stated outpatient  - Viral panel negative  - Procal of 0.41, repeat tomorrow to evaluate therapy   - Sputum culture     #Hx of IDDM 2/2 chronic glucocorticoid use  Patient has history of chronic prednisone use for his kidney transplant in 2010  Most recent A1c is 11.2 and today's admission showing glucose within target range of  140-180  Patient is insulin dependent and recently with the help of his GP discontinued all oral diabetes medication to simplify  his  medication list. He now only takes 20U of lantus daily.   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 Currently using immunotherapy for treatment.  - Wound consult entered  Last given his immunotherapy last Thursday and received them every 3 weeks     #Hx of persistent A-fib  Currently rate controlled on Metoprolol     #Hx of Kidney transplant  Currently on Sacrolimus and prednisone for immunosuppression

## 2024-06-12 NOTE — PROGRESS NOTES
Monitor Tech called to inform this RN of patient having 13 beats Vtach. MD notified.   Patient asleep upon rounding. No pain.   Currently Afib again.

## 2024-06-13 ENCOUNTER — APPOINTMENT (OUTPATIENT)
Dept: RADIOLOGY | Facility: MEDICAL CENTER | Age: 69
DRG: 291 | End: 2024-06-13
Payer: MEDICARE

## 2024-06-13 LAB
ALBUMIN SERPL BCP-MCNC: 3 G/DL (ref 3.2–4.9)
BUN SERPL-MCNC: 75 MG/DL (ref 8–22)
CALCIUM ALBUM COR SERPL-MCNC: 10.2 MG/DL (ref 8.5–10.5)
CALCIUM SERPL-MCNC: 9.4 MG/DL (ref 8.5–10.5)
CHLORIDE SERPL-SCNC: 101 MMOL/L (ref 96–112)
CO2 SERPL-SCNC: 23 MMOL/L (ref 20–33)
CREAT SERPL-MCNC: 2.43 MG/DL (ref 0.5–1.4)
ERYTHROCYTE [DISTWIDTH] IN BLOOD BY AUTOMATED COUNT: 52.2 FL (ref 35.9–50)
GFR SERPLBLD CREATININE-BSD FMLA CKD-EPI: 28 ML/MIN/1.73 M 2
GLUCOSE BLD STRIP.AUTO-MCNC: 206 MG/DL (ref 65–99)
GLUCOSE BLD STRIP.AUTO-MCNC: 214 MG/DL (ref 65–99)
GLUCOSE BLD STRIP.AUTO-MCNC: 268 MG/DL (ref 65–99)
GLUCOSE SERPL-MCNC: 187 MG/DL (ref 65–99)
HCT VFR BLD AUTO: 40.4 % (ref 42–52)
HGB BLD-MCNC: 11.7 G/DL (ref 14–18)
MAGNESIUM SERPL-MCNC: 2 MG/DL (ref 1.5–2.5)
MCH RBC QN AUTO: 25.1 PG (ref 27–33)
MCHC RBC AUTO-ENTMCNC: 29 G/DL (ref 32.3–36.5)
MCV RBC AUTO: 86.7 FL (ref 81.4–97.8)
PHOSPHATE SERPL-MCNC: 3.7 MG/DL (ref 2.5–4.5)
PLATELET # BLD AUTO: 63 K/UL (ref 164–446)
PLATELETS.RETICULATED NFR BLD AUTO: 15.3 % (ref 0.6–13.1)
POTASSIUM SERPL-SCNC: 5.2 MMOL/L (ref 3.6–5.5)
PROCALCITONIN SERPL-MCNC: 0.34 NG/ML
RBC # BLD AUTO: 4.66 M/UL (ref 4.7–6.1)
SODIUM SERPL-SCNC: 137 MMOL/L (ref 135–145)
WBC # BLD AUTO: 5 K/UL (ref 4.8–10.8)

## 2024-06-13 PROCEDURE — 84145 PROCALCITONIN (PCT): CPT

## 2024-06-13 PROCEDURE — 700102 HCHG RX REV CODE 250 W/ 637 OVERRIDE(OP)

## 2024-06-13 PROCEDURE — 99232 SBSQ HOSP IP/OBS MODERATE 35: CPT | Mod: GC | Performed by: INTERNAL MEDICINE

## 2024-06-13 PROCEDURE — 36415 COLL VENOUS BLD VENIPUNCTURE: CPT

## 2024-06-13 PROCEDURE — A9270 NON-COVERED ITEM OR SERVICE: HCPCS

## 2024-06-13 PROCEDURE — 85055 RETICULATED PLATELET ASSAY: CPT

## 2024-06-13 PROCEDURE — 700111 HCHG RX REV CODE 636 W/ 250 OVERRIDE (IP)

## 2024-06-13 PROCEDURE — 71045 X-RAY EXAM CHEST 1 VIEW: CPT

## 2024-06-13 PROCEDURE — 71046 X-RAY EXAM CHEST 2 VIEWS: CPT

## 2024-06-13 PROCEDURE — 770020 HCHG ROOM/CARE - TELE (206)

## 2024-06-13 PROCEDURE — A9270 NON-COVERED ITEM OR SERVICE: HCPCS | Performed by: INTERNAL MEDICINE

## 2024-06-13 PROCEDURE — 700102 HCHG RX REV CODE 250 W/ 637 OVERRIDE(OP): Performed by: INTERNAL MEDICINE

## 2024-06-13 PROCEDURE — 85027 COMPLETE CBC AUTOMATED: CPT

## 2024-06-13 PROCEDURE — 82962 GLUCOSE BLOOD TEST: CPT | Mod: 91

## 2024-06-13 PROCEDURE — 80069 RENAL FUNCTION PANEL: CPT

## 2024-06-13 PROCEDURE — 83735 ASSAY OF MAGNESIUM: CPT

## 2024-06-13 RX ORDER — FUROSEMIDE 40 MG/1
40 TABLET ORAL
Status: DISCONTINUED | OUTPATIENT
Start: 2024-06-13 | End: 2024-06-18

## 2024-06-13 RX ADMIN — OXYCODONE HYDROCHLORIDE 5 MG: 5 TABLET ORAL at 21:55

## 2024-06-13 RX ADMIN — OMEPRAZOLE 20 MG: 20 CAPSULE, DELAYED RELEASE ORAL at 05:22

## 2024-06-13 RX ADMIN — GABAPENTIN 400 MG: 400 CAPSULE ORAL at 17:18

## 2024-06-13 RX ADMIN — FUROSEMIDE 40 MG: 40 TABLET ORAL at 21:32

## 2024-06-13 RX ADMIN — BENZONATATE 200 MG: 100 CAPSULE ORAL at 05:21

## 2024-06-13 RX ADMIN — METOPROLOL SUCCINATE 25 MG: 25 TABLET, EXTENDED RELEASE ORAL at 05:22

## 2024-06-13 RX ADMIN — AZITHROMYCIN DIHYDRATE 500 MG: 250 TABLET, FILM COATED ORAL at 15:51

## 2024-06-13 RX ADMIN — ASPIRIN 81 MG: 81 TABLET, COATED ORAL at 05:22

## 2024-06-13 RX ADMIN — MOMETASONE FUROATE AND FORMOTEROL FUMARATE DIHYDRATE 2 PUFF: 200; 5 AEROSOL RESPIRATORY (INHALATION) at 05:12

## 2024-06-13 RX ADMIN — FUROSEMIDE 40 MG: 40 TABLET ORAL at 12:25

## 2024-06-13 RX ADMIN — ACETAMINOPHEN 650 MG: 325 TABLET, FILM COATED ORAL at 17:18

## 2024-06-13 RX ADMIN — HEPARIN SODIUM 5000 UNITS: 5000 INJECTION, SOLUTION INTRAVENOUS; SUBCUTANEOUS at 05:18

## 2024-06-13 RX ADMIN — ATORVASTATIN CALCIUM 80 MG: 80 TABLET, FILM COATED ORAL at 21:32

## 2024-06-13 RX ADMIN — HEPARIN SODIUM 5000 UNITS: 5000 INJECTION, SOLUTION INTRAVENOUS; SUBCUTANEOUS at 21:32

## 2024-06-13 RX ADMIN — ACETAMINOPHEN 650 MG: 325 TABLET, FILM COATED ORAL at 10:53

## 2024-06-13 RX ADMIN — PREDNISONE 20 MG: 20 TABLET ORAL at 05:22

## 2024-06-13 RX ADMIN — METOLAZONE 2.5 MG: 2.5 TABLET ORAL at 05:22

## 2024-06-13 RX ADMIN — MOMETASONE FUROATE AND FORMOTEROL FUMARATE DIHYDRATE 2 PUFF: 200; 5 AEROSOL RESPIRATORY (INHALATION) at 17:17

## 2024-06-13 RX ADMIN — HEPARIN SODIUM 5000 UNITS: 5000 INJECTION, SOLUTION INTRAVENOUS; SUBCUTANEOUS at 13:19

## 2024-06-13 RX ADMIN — INSULIN GLARGINE-YFGN 10 UNITS: 100 INJECTION, SOLUTION SUBCUTANEOUS at 21:39

## 2024-06-13 RX ADMIN — FUROSEMIDE 40 MG: 10 INJECTION, SOLUTION INTRAVENOUS at 05:13

## 2024-06-13 RX ADMIN — GABAPENTIN 400 MG: 400 CAPSULE ORAL at 05:22

## 2024-06-13 RX ADMIN — METOLAZONE 2.5 MG: 2.5 TABLET ORAL at 15:51

## 2024-06-13 RX ADMIN — AMOXICILLIN AND CLAVULANATE POTASSIUM 1 TABLET: 875; 125 TABLET, FILM COATED ORAL at 17:18

## 2024-06-13 RX ADMIN — AMOXICILLIN AND CLAVULANATE POTASSIUM 1 TABLET: 875; 125 TABLET, FILM COATED ORAL at 05:22

## 2024-06-13 RX ADMIN — SIROLIMUS 4 MG: 0.5 TABLET ORAL at 05:21

## 2024-06-13 RX ADMIN — INSULIN LISPRO 2 UNITS: 100 INJECTION, SOLUTION INTRAVENOUS; SUBCUTANEOUS at 09:49

## 2024-06-13 RX ADMIN — INSULIN LISPRO 2 UNITS: 100 INJECTION, SOLUTION INTRAVENOUS; SUBCUTANEOUS at 13:30

## 2024-06-13 RX ADMIN — INSULIN LISPRO 3 UNITS: 100 INJECTION, SOLUTION INTRAVENOUS; SUBCUTANEOUS at 21:39

## 2024-06-13 RX ADMIN — Medication 2 SPRAY: at 05:13

## 2024-06-13 ASSESSMENT — ENCOUNTER SYMPTOMS
WHEEZING: 0
CHILLS: 0
ORTHOPNEA: 1
SHORTNESS OF BREATH: 0
FEVER: 0
SPUTUM PRODUCTION: 1
DIZZINESS: 1
COUGH: 1

## 2024-06-13 ASSESSMENT — PAIN DESCRIPTION - PAIN TYPE: TYPE: ACUTE PAIN

## 2024-06-13 ASSESSMENT — FIBROSIS 4 INDEX: FIB4 SCORE: 4.51

## 2024-06-13 NOTE — PROGRESS NOTES
Received report from night shift RNOlivia . Assumed care of pt at 0645. Pt reports no nausea, vomiting, numbness, tingling, at this time. Does report some tenderness at wound site. AOx4. Updated pt on plan of care with this RN and MD Simental. Pt sitting in bed with fiance at bedside. Educated on use of call light which is placed nearby patient. Hourly rounding and continuous pulse ox monitoring in place, O2 saturation at 92 on 3L NC. Questions and concerns answered at this time.

## 2024-06-13 NOTE — CARE PLAN
The patient is Stable - Low risk of patient condition declining or worsening    Shift Goals  Clinical Goals: wound care, monitor and mange oxygen demand and supplemental oxygen, promote rest  Patient Goals: wound care, respiratory comfort, treatment plan  Family Goals: JUDITH    Progress made toward(s) clinical / shift goals:    Problem: Knowledge Deficit - Standard  Goal: Patient and family/care givers will demonstrate understanding of plan of care, disease process/condition, diagnostic tests and medications  Description: Target End Date:  1-3 days or as soon as patient condition allows    Document in Patient Education    1.  Patient and family/caregiver oriented to unit, equipment, visitation policy and means for communicating concern  2.  Complete/review Learning Assessment  3.  Assess knowledge level of disease process/condition, treatment plan, diagnostic tests and medications  4.  Explain disease process/condition, treatment plan, diagnostic tests and medications  Outcome: Progressing  Note: Plan of care discussed with patient during bedside assessment, including prescribed medication regimen, sputum sample collection, and wound care time for this evening prior to bed.   Patient also educated on continued monitoring of heart rate and rhythm, along with oxygen demand.  Patient stating understanding and compliance with goals of care.      Problem: Pain - Standard  Goal: Alleviation of pain or a reduction in pain to the patient’s comfort goal  Description: Target End Date:  Prior to discharge or change in level of care    Document on Vitals flowsheet    1.  Document pain using the appropriate pain scale per order or unit policy  2.  Educate and implement non-pharmacologic comfort measures (i.e. relaxation, distraction, massage, cold/heat therapy, etc.)  3.  Pain management medications as ordered  4.  Reassess pain after pain med administration per policy  5.  If opiods administered assess patient's response to pain  medication is appropriate per POSS sedation scale  6.  Follow pain management plan developed in collaboration with patient and interdisciplinary team (including palliative care or pain specialists if applicable)  Outcome: Progressing  Note: Patient having increased pain during wound dressing changes. Prescribed, prn oxycodone given to aid in comfort and decrease pain associated with wound care and dressing changes with optimal effect.      Problem: Skin Integrity  Goal: Skin integrity is maintained or improved  Description: Target End Date:  Prior to discharge or change in level of care    Document interventions on Skin Risk/Javad flowsheet groups and corresponding LDA    1.  Assess and monitor skin integrity, appearance and/or temperature  2.  Assess risk factors for impaired skin integrity and/or pressures ulcers  3.  Implement precautions to protect skin integrity in collaboration with interdisciplinary team  4.  Implement pressure ulcer prevention protocol if at risk for skin breakdown  5.  Confirm wound care consult if at risk for skin breakdown  6.  Ensure patient use of pressure relieving devices  (Low air loss bed, waffle overlay, heel protectors, ROHO cushion, etc)  Outcome: Progressing  Note: Wound care conducted this shift per orders and wound instructions.   Left axilla dressing changed per patient preference prior to bed per instructions/orders.        Patient is not progressing towards the following goals:      Problem: Respiratory  Goal: Patient will achieve/maintain optimum respiratory ventilation and gas exchange  Description: Target End Date:  Prior to discharge or change in level of care    Document on Assessment flowsheet    1.  Assess and monitor rate, rhythm, depth and effort of respiration  2.  Breath sounds assessed qshift and/or as needed  3.  Assess O2 saturation, administer/titrate oxygen as ordered  4.  Position patient for maximum ventilatory efficiency  5.  Turn, cough, and deep breath  with splinting to improve effectiveness  6.  Collaborate with RT to administer medication/treatments per order  7.  Encourage use of incentive spirometer and encourage patient to cough after use and utilize splinting techniques if applicable  8.  Airway suctioning  9.  Monitor sputum production for changes in color, consistency and frequency  10. Perform frequent oral hygiene  11. Alternate physical activity with rest periods  Outcome: Not Progressing  Note: Patient having increased productive coughing this shift compared to previous shift. Sputum sample sent to lab per orders. Prescribed tessalon given to aid in comfort and cough management with some effect.     Patient also having frequent desaturations throughout this shift. Upon initial bedside assessment, patient was on 1.5 L NC with oxygen saturation of 92% at rest. Around midnight, desaturations noted to 88% with increased coughing, therefore increasing to 2 L NC with 91% oxygen saturation at rest.   Patient continued to having desaturations as low as 85% during coughing, resulting in current increase to 3 L NC with oxygen saturation of 91% at rest.   MD notified and updated on this. Plan for CXR in the morning.   Patient updated on plan of care.

## 2024-06-13 NOTE — HOSPITAL COURSE
Jama Altman is a 68 y.o. 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 CAD s/p stent, renal transplant 2010 on chronic immunosuppressant therapy, (hx of polycystic kidney disease), HLD, HTN and PAD who was admitted due to hypervolemia in the setting of acute on chronic kidney injury and acute hypoxic failure.    At ED,  saturating 94% at 2 L of oxygen but afebrile.  CMP was remarkable for elevated BUN of 80 and elevated creatinine of 2.59.  Chest x-ray presented hypoinflation with bibasilar atelectasis/pneumonitis and small pleural effusion without significant changes compared to 5/28/24 chest x-ray.  At ED, patient received IV Lasix of 60 mg.   Nephrology has been following and recommended diuresing.  Has been treating with antibiotics for possible pneumonia.  D-dimer was elevated. With acute kidney injury and history of Kidney transplant, V/Q scan was ordered but patient could not lie down flat more than 15 mins so V/Q scan got canceled. Patient agreed to start heparin to treat possible pulmonary embolism after discussion benefit and risk of heparin including bleeding risk. Cardiologist consulted for atrial fibrillation with RVR on exertion. Infectious disease was consulted and recommended 7 days of levofloxacin. Pulm was consulted, recommended discontinuing heparin (no concern for PE), recommended continuing Abx and diuresing. CT thorax w/o if his symptom is not improving.

## 2024-06-13 NOTE — PROGRESS NOTES
"[facility] -  PROGRESS NOTE               Author: Fish Wilder M.D. Date & Time: 6/13/2024  11:15 AM     HPI:  Patient is a 68 year old male with a PMHx of afib, CAD, renal transplant in 2010, PKD, HLD, HTN, and squamous cell carcinoma of the left axillary region on immunotherapy via Dr. Fajardo, DM with last A1c of 6.9%, who presented to hospital for SOB from outpatient nephrology clinic.  Pt also has orthopnea and significant lower extremity edema.  He is currently on sirolimus and pred for his transplant. Scr from 6/5 was 2.89 and his baseline is around 1.5-1.9. Nephrology was asked to consult for SANKET. Scr in the ER was 2.56. He states his SOB has been worsening over the past 3 weeks.      No F/C/N/V/CP/He does have SOB.  No melena, hematochezia, hematemesis.  No HA, visual changes, or abdominal pain. + edema bilateral LE    DAILY NEPHROLOGY SUMMARY:  6/12 - No new overnight renal events. Scr is 2.47 today. Diuresing well.   6/13 - Scr is stable. Good UOP. Feels better.     REVIEW OF SYSTEMS:    10 point ROS reviewed and is as per HPI/daily summary or otherwise negative    PMH/PSH/SH/FH:   Reviewed and unchanged since admission note    CURRENT MEDICATIONS:   Reviewed from admission to present day    VS:  /66   Pulse 81   Temp 36.3 °C (97.3 °F) (Temporal)   Resp 20   Ht 1.981 m (6' 6\")   Wt 100 kg (221 lb 1.9 oz)   SpO2 93%   BMI 25.55 kg/m²     Physical Exam  Vitals and nursing note reviewed.     Constitutional:       General: He is not in acute distress.     Appearance: He is obese. He is not ill-appearing.   HENT:      Head: Normocephalic and atraumatic.      Nose: Nose normal.      Mouth/Throat:      Mouth: Mucous membranes are moist.      Pharynx: Oropharynx is clear.   Eyes:      Extraocular Movements: Extraocular movements intact.      Conjunctiva/sclera: Conjunctivae normal.      Pupils: Pupils are equal, round, and reactive to light.   Cardiovascular:      Rate and " Rhythm: Normal rate and regular rhythm.   Pulmonary:      Effort: No respiratory distress.      Breath sounds: minimal tanya crackles present.   Abdominal:      General: Abdomen is flat. Bowel sounds are normal.      Palpations: Abdomen is soft.   Musculoskeletal:      Cervical back: Normal range of motion and neck supple.      Right lower leg: Edema present.      Left lower leg: Edema present.   Neurological:      General: No focal deficit present.      Mental Status: He is alert and oriented to person, place, and time. Mental status is at baseline.   Psychiatric:         Mood and Affect: Mood normal.         Behavior: Behavior normal.         Thought Content: Thought content normal.         Judgment: Judgment normal.     Fluids:  In: 1140 [P.O.:1140]  Out: 2000     LABS:  Recent Labs     06/11/24  1104 06/12/24  0350 06/13/24  0228   SODIUM 135 138 137   POTASSIUM 5.2 5.1 5.2   CHLORIDE 100 104 101   CO2 22 21 23   GLUCOSE 174* 133* 187*   BUN 80* 77* 75*   CREATININE 2.59* 2.47* 2.43*   CALCIUM 9.0 9.0 9.4       IMAGING:   All imaging reviewed from admission to present day    IMPRESSION:  # SANKET    - Improving since last Scr of 2.89 from 6/5/24. Baseline Scr is ~1.9  # Renal transplant    - Continue with home IS medications  # h/o ADPKD  # Mild hyperkalemia  # Acute pulmonary edema    - Agree with diuresis - Lasix 40 mg IV BID along with Metolazone 2.5 mg PO BID  # Desaturation to 85% as outpatient  # Acidosis  # Chronic Immunosuppression on medications (sirolimus and prednisone)  # Type 2 DM - last A1c was 6.9%  # Proteinuria  # Atrial fibrillation  # Vit d deficiency  # Squamous Cell Ca - getting immunotherapy/followed by Dr. Fajardo. He has mets to lymph nodes.   # Anemia      PLAN:  - There is no acute need for RRT.   - Creatinine down-trending. He does not want dialysis if it comes to it.   - Continue diuresis and switch to PO furosemide today  - Low salt diet  - Continue with home IS medications  - Dose all meds  per eGFR   - Rest of management per primary team

## 2024-06-13 NOTE — DISCHARGE PLANNING
TCN following. HTH/SCP chart reviewed. No new TCN needs identified. Please see prior TCN note from 6/12 for most recent discharge planning considerations if indicated. 6 clicks mobility remains 19 per review. Current dc planning considerations are anticipated for dc to home with outpatient follow ups as indicated. TCN will continue to follow and collaborate with discharge planning team as additional post acute needs arise. Thank you.      Completed today:  Choice obtained: none indicated currently; see above  SCP with [facility]. discussed possible outpatient transitional PCP follow up if indicated and pt in agreement; sent information to assist in scheduling

## 2024-06-13 NOTE — PROGRESS NOTES
Telemetry Monitor Reports    Rhythm: afib  Ectopy: PVC, COUP, Big, 13 & 12 bts VTACH  Elevated HR above 150's - 170's     HR Range: 86 - 100    -/.14/-

## 2024-06-13 NOTE — PROGRESS NOTES
[facility] Internal Medicine Daily Progress Note    Date of Service  6/13/2024    R Team: R IM Green Team   Attending: Charlie Casey M.d.  Senior Resident: Dr. Teresa Estrada  Intern:  Dr. Prince Simental   Contact Number: [phone]    Chief Complaint  Jama Altman is a 68 y.o. male admitted 6/11/2024 with leg swelling and dizziness    Hospital Course  Jama Altman is a 68 y.o. 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 CAD s/p stent, renal transplant 2010 on chronic immunosuppressant therapy, (hx of polycystic kidney disease), HLD, HTN and PAD who presented was admitted due to hypovolemia in the setting of acute on chronic kidney injury and acute hypoxic failure.    At ED,  saturating 94% at 2 L of oxygen but afebrile.  CMP was remarkable for elevated BUN of 80 and elevated creatinine of 2.59.  Chest x-ray presented hypoinflation with bibasilar atelectasis/pneumonitis and small pleural effusion without significant changes compared to 5/28/24 chest x-ray.  At ED, patient received IV Lasix of 60 mg.   Nephrology has been following and recommended diuresing.  Has been treating with antibiotics for possible pneumonia.      Interval Problem Update  Required 2 L of oxygen overnight.  Repeated two-view chest x-ray, no significant difference compared to yesterday  Switch to p.o. Lasix by nephrologist    Subjective  Patient stated that he feels better after he takes antibiotics this morning.  He noticed that his leg is less swollen compared to yesterday.  I have discussed this patient's plan of care and discharge plan at IDT rounds today with Case Management, Nursing, Nursing leadership, and other members of the IDT team.    Consultants/Specialty  nephrology- Dr. Wilder     Code Status  Full Code    Disposition  The patient is not medically cleared for  discharge to home or a post-acute facility.      I have placed the appropriate orders for post-discharge needs.    Review of Systems  Review of Systems   Constitutional:  Negative for chills and fever.   Respiratory:  Positive for cough and sputum production. Negative for shortness of breath and wheezing.    Cardiovascular:  Positive for orthopnea and leg swelling. Negative for chest pain.        Leg swelling improving   Neurological:  Positive for dizziness.        Physical Exam  Temp:  [36.2 °C (97.2 °F)-36.5 °C (97.7 °F)] 36.3 °C (97.3 °F)  Pulse:  [58-90] 81  Resp:  [18-20] 20  BP: ()/(52-75) 112/66  SpO2:  [90 %-100 %] 93 %    Physical Exam  Vitals reviewed.   Constitutional:       General: He is not in acute distress.     Appearance: He is not ill-appearing, toxic-appearing or diaphoretic.   HENT:      Head: Normocephalic and atraumatic.      Mouth/Throat:      Mouth: Mucous membranes are moist.      Pharynx: Oropharynx is clear. No oropharyngeal exudate.   Eyes:      Extraocular Movements: Extraocular movements intact.      Pupils: Pupils are equal, round, and reactive to light.   Cardiovascular:      Rate and Rhythm: Normal rate. Rhythm irregular.      Heart sounds: No murmur heard.  Pulmonary:      Effort: Pulmonary effort is normal. No respiratory distress.      Comments: Trace crackles bilateral basilar  Abdominal:      General: Abdomen is flat. There is distension.      Palpations: Abdomen is soft.      Tenderness: There is no abdominal tenderness. There is no guarding or rebound.   Musculoskeletal:         General: Normal range of motion.      Cervical back: Normal range of motion. No rigidity.      Right lower leg: Edema present.      Left lower leg: Edema present.      Comments: Trace pitting edema bilateral lower extremity (improving)  Swelling on left upper extremity, patient stated its chronic   Skin:     General: Skin is warm and dry.      Coloration: Skin is not jaundiced.      Findings:  Lesion present.      Comments: lesion dressing applied on left axillary region  Metastatic cancer with necrosis   Neurological:      General: No focal deficit present.      Mental Status: He is alert and oriented to person, place, and time.      Cranial Nerves: No cranial nerve deficit.   Psychiatric:         Mood and Affect: Mood normal.         Behavior: Behavior normal.         Fluids    Intake/Output Summary (Last 24 hours) at 6/13/2024 1140  Last data filed at 6/13/2024 0800  Gross per 24 hour   Intake 900 ml   Output 2200 ml   Net -1300 ml       Laboratory  Recent Labs     06/11/24  1104 06/12/24  0350 06/13/24  0228   WBC 7.7 5.2 5.0   RBC 4.58* 4.49* 4.66*   HEMOGLOBIN 12.0* 11.5* 11.7*   HEMATOCRIT 38.1* 39.1* 40.4*   MCV 83.2 87.1 86.7   MCH 26.2* 25.6* 25.1*   MCHC 31.5* 29.4* 29.0*   RDW 50.4* 51.9* 52.2*   PLATELETCT 72* 61* 63*     Recent Labs     06/11/24  1104 06/12/24  0350 06/13/24 0228   SODIUM 135 138 137   POTASSIUM 5.2 5.1 5.2   CHLORIDE 100 104 101   CO2 22 21 23   GLUCOSE 174* 133* 187*   BUN 80* 77* 75*   CREATININE 2.59* 2.47* 2.43*   CALCIUM 9.0 9.0 9.4                   Imaging  DX-CHEST-2 VIEWS   Final Result      1.  Stable cardiomegaly and interstitial edema.      2.  Stable bibasilar atelectasis/consolidation and bilateral pleural effusions.      DX-CHEST-PORTABLE (1 VIEW)   Final Result         1.  Pulmonary edema and/or infiltrates, increased since prior study.   2.  Moderate bilateral layering pleural effusions   3.  Cardiomegaly   4.  Atherosclerosis      DX-CHEST-2 VIEWS   Final Result      Small effusions with bibasilar atelectasis/infiltrate. There is also mildly increased right upper lobe infiltrate.      DX-CHEST-PORTABLE (1 VIEW)   Final Result      Hypoinflation with bibasilar atelectasis/pneumonitis and trace/small pleural effusions. No significant change.            Assessment/Plan  Problem Representation:    * Acute respiratory failure with hypoxia (HCC)- (present on  admission)  Assessment & Plan  Patient has been endorsing shortness of breath and orthopnea.  Hypervolemic on assessment.  Requiring 2 L oxygen at ED.  Chest x-ray presented hypoinflation with bibasilar atelectasis/pneumonitis and small pleural effusion without significant changes compared to 5/28/24 chest x-ray. 2 view CXR with RUL infiltrate, procal elevated 0.4 on admission   Likely due to fluid overload from acute kidney injury, possible pneumonia (high risk due to immunosuppression), less likely acute on chronic heart failure. MRSA nares positive March    (6/13/24) increased oxygen requirement overnight.  Two-view chest x-ray was unremarkable compared to yesterday.  Repeat procalcitonin trending down to 0.34.   -Transition to oral Lasix 40 mg twice daily starting from 6/13/2024  - Metolazone 2.5mg BID  -Augmentin and azithromycin for possible CAP  -Sputum culture presented a few gram-positive gross  -If no improvement in oxygenation despite achieving euvolemia may broaden antibiotics       Hypervolemia  Assessment & Plan  Patient endorses 5 pounds weight gain past 3 weeks.  Has been following [facility].  Was sent to ED by recommended from nephrologist due to unresponsive p.o. Lasix.  -Nephrology consulted, see notes for further details but in short  - IV Lasix 40 mg BID, transition to oral Lasix 6/13  - Metolazone 2.5mg BID  -Fluid restriction to 1L, low salt diet  -Daily standing weights  -Daily renal panel with mag     Acute on chronic renal insufficiency- (present on admission)  Assessment & Plan  Baseline creatinine 1.7 - 2.0  Nephrology consulted  -Same plan as hypervolemia    Lymphedema on LUE  Assessment & Plan  Chronic lymphedema on left upper extremity  US LUE 5/4/24 presented No gross evidence of left upper extremity DVT or SVT.   - CTM    HFmrEF  Assessment & Plan  Echocardiogram 5/30/24 presented LVEF of 45% with moderate concentric left ventricular hypertrophy.  Possibly, acute  respiratory failure secondary to cardiorenal syndrome or acute on chronic heart failure but less likely   -Same plan as hypervolemia    Orthostatic dizziness  Assessment & Plan  Endorses worsening dizziness and lightheadedness when he is standing up from sitting up. Negative orthostatics by blood pressure but pulse does increase dramatically with ambulation  -Fall risk precaution    Stage 4 squamous cell cancer of left axillary region complicated by left axillary abcscess s/p drainage- (present on admission)  Assessment & Plan  Has been followed by outpatient oncologist.   Per patient, he is currently on immunotherapy and last therapy was on last Friday (6/7/24)  -Continue home prednisone and Sirolimus  - Wound consult placed  -Continue home gabapentin    A-fib (HCC) s/p Watchman- (present on admission)  Assessment & Plan  - Continue home metoprolol  - Telemetry monitoring     Type 2 diabetes mellitus (HCC)- (present on admission)  Assessment & Plan  Hem A1c 11.2 (5/23/24)  Home glargine 20 units  -Glargine 10 units  -SSI  -Hypoglycemia protocol      GERD (gastroesophageal reflux disease)- (present on admission)  Assessment & Plan  - Continue home omeprazole    Coronary artery disease s/p stent- (present on admission)  Assessment & Plan  -Continue home aspirin and atorvastatin    Hyperlipidemia- (present on admission)  Assessment & Plan  -Continue home atorvastatin         VTE prophylaxis: heparin ppx    I have performed a physical exam and reviewed and updated ROS and Plan today (6/13/2024). In review of yesterday's note (6/12/2024), there are no changes except as documented above.

## 2024-06-14 LAB
ALBUMIN SERPL BCP-MCNC: 3.2 G/DL (ref 3.2–4.9)
ALBUMIN/GLOB SERPL: 1.3 G/DL
ALP SERPL-CCNC: 67 U/L (ref 30–99)
ALT SERPL-CCNC: 20 U/L (ref 2–50)
ANION GAP SERPL CALC-SCNC: 14 MMOL/L (ref 7–16)
APTT PPP: 31.7 SEC (ref 24.7–36)
AST SERPL-CCNC: 15 U/L (ref 12–45)
BACTERIA SPEC RESP CULT: NORMAL
BILIRUB SERPL-MCNC: 0.4 MG/DL (ref 0.1–1.5)
BUN SERPL-MCNC: 73 MG/DL (ref 8–22)
CALCIUM ALBUM COR SERPL-MCNC: 9.8 MG/DL (ref 8.5–10.5)
CALCIUM SERPL-MCNC: 9.2 MG/DL (ref 8.5–10.5)
CHLORIDE SERPL-SCNC: 102 MMOL/L (ref 96–112)
CO2 SERPL-SCNC: 24 MMOL/L (ref 20–33)
CREAT SERPL-MCNC: 2.34 MG/DL (ref 0.5–1.4)
D DIMER PPP IA.FEU-MCNC: 2.6 UG/ML (FEU) (ref 0–0.5)
GFR SERPLBLD CREATININE-BSD FMLA CKD-EPI: 30 ML/MIN/1.73 M 2
GLOBULIN SER CALC-MCNC: 2.5 G/DL (ref 1.9–3.5)
GLUCOSE BLD STRIP.AUTO-MCNC: 208 MG/DL (ref 65–99)
GLUCOSE BLD STRIP.AUTO-MCNC: 295 MG/DL (ref 65–99)
GLUCOSE BLD STRIP.AUTO-MCNC: 342 MG/DL (ref 65–99)
GLUCOSE SERPL-MCNC: 202 MG/DL (ref 65–99)
GRAM STN SPEC: NORMAL
INR PPP: 0.95 (ref 0.87–1.13)
MAGNESIUM SERPL-MCNC: 2.1 MG/DL (ref 1.5–2.5)
NT-PROBNP SERPL IA-MCNC: 7303 PG/ML (ref 0–125)
PHOSPHATE SERPL-MCNC: 3.2 MG/DL (ref 2.5–4.5)
POTASSIUM SERPL-SCNC: 4.7 MMOL/L (ref 3.6–5.5)
PROCALCITONIN SERPL-MCNC: 0.25 NG/ML
PROT SERPL-MCNC: 5.7 G/DL (ref 6–8.2)
PROTHROMBIN TIME: 12.7 SEC (ref 12–14.6)
SIGNIFICANT IND 70042: NORMAL
SITE SITE: NORMAL
SODIUM SERPL-SCNC: 140 MMOL/L (ref 135–145)
SOURCE SOURCE: NORMAL
UFH PPP CHRO-ACNC: 0.13 IU/ML

## 2024-06-14 PROCEDURE — 700102 HCHG RX REV CODE 250 W/ 637 OVERRIDE(OP): Performed by: INTERNAL MEDICINE

## 2024-06-14 PROCEDURE — 700102 HCHG RX REV CODE 250 W/ 637 OVERRIDE(OP)

## 2024-06-14 PROCEDURE — A9270 NON-COVERED ITEM OR SERVICE: HCPCS

## 2024-06-14 PROCEDURE — 84145 PROCALCITONIN (PCT): CPT

## 2024-06-14 PROCEDURE — 85610 PROTHROMBIN TIME: CPT

## 2024-06-14 PROCEDURE — 85730 THROMBOPLASTIN TIME PARTIAL: CPT

## 2024-06-14 PROCEDURE — 99232 SBSQ HOSP IP/OBS MODERATE 35: CPT | Mod: GC | Performed by: INTERNAL MEDICINE

## 2024-06-14 PROCEDURE — 83735 ASSAY OF MAGNESIUM: CPT

## 2024-06-14 PROCEDURE — 770020 HCHG ROOM/CARE - TELE (206)

## 2024-06-14 PROCEDURE — 85379 FIBRIN DEGRADATION QUANT: CPT

## 2024-06-14 PROCEDURE — 84100 ASSAY OF PHOSPHORUS: CPT

## 2024-06-14 PROCEDURE — 80053 COMPREHEN METABOLIC PANEL: CPT | Mod: 91

## 2024-06-14 PROCEDURE — A9270 NON-COVERED ITEM OR SERVICE: HCPCS | Performed by: INTERNAL MEDICINE

## 2024-06-14 PROCEDURE — 85520 HEPARIN ASSAY: CPT | Mod: 91

## 2024-06-14 PROCEDURE — 700111 HCHG RX REV CODE 636 W/ 250 OVERRIDE (IP)

## 2024-06-14 PROCEDURE — 82962 GLUCOSE BLOOD TEST: CPT | Mod: 91

## 2024-06-14 PROCEDURE — 83880 ASSAY OF NATRIURETIC PEPTIDE: CPT

## 2024-06-14 PROCEDURE — 36415 COLL VENOUS BLD VENIPUNCTURE: CPT

## 2024-06-14 RX ORDER — METOPROLOL SUCCINATE 25 MG/1
25 TABLET, EXTENDED RELEASE ORAL ONCE
Status: COMPLETED | OUTPATIENT
Start: 2024-06-14 | End: 2024-06-14

## 2024-06-14 RX ORDER — METOPROLOL SUCCINATE 25 MG/1
50 TABLET, EXTENDED RELEASE ORAL DAILY
Status: DISCONTINUED | OUTPATIENT
Start: 2024-06-15 | End: 2024-06-15

## 2024-06-14 RX ORDER — METOLAZONE 2.5 MG/1
2.5 TABLET ORAL
Status: DISCONTINUED | OUTPATIENT
Start: 2024-06-14 | End: 2024-06-15

## 2024-06-14 RX ORDER — GUAIFENESIN 600 MG/1
600 TABLET, EXTENDED RELEASE ORAL EVERY 12 HOURS
Status: DISCONTINUED | OUTPATIENT
Start: 2024-06-14 | End: 2024-06-21 | Stop reason: HOSPADM

## 2024-06-14 RX ORDER — HEPARIN SODIUM 5000 [USP'U]/100ML
0-30 INJECTION, SOLUTION INTRAVENOUS CONTINUOUS
Status: DISCONTINUED | OUTPATIENT
Start: 2024-06-14 | End: 2024-06-16

## 2024-06-14 RX ORDER — INSULIN LISPRO 100 [IU]/ML
1-6 INJECTION, SOLUTION INTRAVENOUS; SUBCUTANEOUS
Status: DISCONTINUED | OUTPATIENT
Start: 2024-06-14 | End: 2024-06-15

## 2024-06-14 RX ADMIN — OMEPRAZOLE 20 MG: 20 CAPSULE, DELAYED RELEASE ORAL at 05:18

## 2024-06-14 RX ADMIN — ACETAMINOPHEN 650 MG: 325 TABLET, FILM COATED ORAL at 17:46

## 2024-06-14 RX ADMIN — PREDNISONE 20 MG: 20 TABLET ORAL at 05:18

## 2024-06-14 RX ADMIN — HEPARIN SODIUM 5000 UNITS: 5000 INJECTION, SOLUTION INTRAVENOUS; SUBCUTANEOUS at 05:19

## 2024-06-14 RX ADMIN — METOLAZONE 2.5 MG: 2.5 TABLET ORAL at 13:13

## 2024-06-14 RX ADMIN — ATORVASTATIN CALCIUM 80 MG: 80 TABLET, FILM COATED ORAL at 21:46

## 2024-06-14 RX ADMIN — FUROSEMIDE 40 MG: 40 TABLET ORAL at 05:18

## 2024-06-14 RX ADMIN — METOPROLOL SUCCINATE 25 MG: 25 TABLET, EXTENDED RELEASE ORAL at 05:18

## 2024-06-14 RX ADMIN — GUAIFENESIN 600 MG: 600 TABLET, EXTENDED RELEASE ORAL at 06:18

## 2024-06-14 RX ADMIN — AMOXICILLIN AND CLAVULANATE POTASSIUM 1 TABLET: 875; 125 TABLET, FILM COATED ORAL at 17:44

## 2024-06-14 RX ADMIN — GABAPENTIN 400 MG: 400 CAPSULE ORAL at 17:44

## 2024-06-14 RX ADMIN — METOPROLOL SUCCINATE 25 MG: 25 TABLET, EXTENDED RELEASE ORAL at 10:25

## 2024-06-14 RX ADMIN — MOMETASONE FUROATE AND FORMOTEROL FUMARATE DIHYDRATE 2 PUFF: 200; 5 AEROSOL RESPIRATORY (INHALATION) at 17:45

## 2024-06-14 RX ADMIN — ACETAMINOPHEN 650 MG: 325 TABLET, FILM COATED ORAL at 10:45

## 2024-06-14 RX ADMIN — GABAPENTIN 400 MG: 400 CAPSULE ORAL at 05:18

## 2024-06-14 RX ADMIN — ASPIRIN 81 MG: 81 TABLET, COATED ORAL at 05:18

## 2024-06-14 RX ADMIN — OXYCODONE HYDROCHLORIDE 5 MG: 5 TABLET ORAL at 21:46

## 2024-06-14 RX ADMIN — INSULIN LISPRO 2 UNITS: 100 INJECTION, SOLUTION INTRAVENOUS; SUBCUTANEOUS at 09:04

## 2024-06-14 RX ADMIN — INSULIN LISPRO 4 UNITS: 100 INJECTION, SOLUTION INTRAVENOUS; SUBCUTANEOUS at 19:46

## 2024-06-14 RX ADMIN — AZITHROMYCIN DIHYDRATE 500 MG: 250 TABLET, FILM COATED ORAL at 16:15

## 2024-06-14 RX ADMIN — METOLAZONE 2.5 MG: 2.5 TABLET ORAL at 05:18

## 2024-06-14 RX ADMIN — MOMETASONE FUROATE AND FORMOTEROL FUMARATE DIHYDRATE 2 PUFF: 200; 5 AEROSOL RESPIRATORY (INHALATION) at 05:18

## 2024-06-14 RX ADMIN — INSULIN LISPRO 3 UNITS: 100 INJECTION, SOLUTION INTRAVENOUS; SUBCUTANEOUS at 13:13

## 2024-06-14 RX ADMIN — AMOXICILLIN AND CLAVULANATE POTASSIUM 1 TABLET: 875; 125 TABLET, FILM COATED ORAL at 05:18

## 2024-06-14 RX ADMIN — SIROLIMUS 4 MG: 0.5 TABLET ORAL at 05:19

## 2024-06-14 RX ADMIN — HEPARIN SODIUM 18 UNITS/KG/HR: 5000 INJECTION, SOLUTION INTRAVENOUS at 17:51

## 2024-06-14 RX ADMIN — FUROSEMIDE 40 MG: 40 TABLET ORAL at 16:13

## 2024-06-14 RX ADMIN — HEPARIN SODIUM 5000 UNITS: 5000 INJECTION, SOLUTION INTRAVENOUS; SUBCUTANEOUS at 13:13

## 2024-06-14 RX ADMIN — GUAIFENESIN 600 MG: 600 TABLET, EXTENDED RELEASE ORAL at 17:44

## 2024-06-14 ASSESSMENT — ENCOUNTER SYMPTOMS
CHILLS: 0
DIZZINESS: 1
FEVER: 0
SPUTUM PRODUCTION: 1
VOMITING: 0
WHEEZING: 0
ORTHOPNEA: 1
SHORTNESS OF BREATH: 0
NAUSEA: 0
COUGH: 1

## 2024-06-14 ASSESSMENT — PAIN DESCRIPTION - PAIN TYPE
TYPE: ACUTE PAIN

## 2024-06-14 ASSESSMENT — FIBROSIS 4 INDEX: FIB4 SCORE: 4.51

## 2024-06-14 NOTE — PROGRESS NOTES
Bedside report received and patient care assumed. Pt is resting in bed, A&O4, with no complaints of pain, and is on 1.5 L via NC. Tele box on. Low fall precautions are in place, belongings at bedside table.  Pt was updated on POC, no questions or concerns. Pt educated on use of call light for assistance.

## 2024-06-14 NOTE — DISCHARGE PLANNING
TCN following. HTH/SCP chart reviewed. As prior, no new TCN needs identified. Please see prior TCN note from 6/12 for most recent discharge planning considerations if indicated. 6 clicks mobility remains 19 per review and pt remains ambulatory with no AD. Current dc planning considerations are anticipated for dc to home with outpatient follow ups as indicated. TCN will continue to follow and collaborate with discharge planning team as additional post acute needs arise. Thank you.      Completed today:  Choice obtained: none indicated currently; see above  SCP with [facility]. discussed possible outpatient transitional PCP follow up if indicated and pt in agreement; sent information to assist in scheduling

## 2024-06-14 NOTE — PROGRESS NOTES
[facility] Internal Medicine Daily Progress Note    Date of Service  6/14/2024    R Team: R IM Green Team   Attending: Charlie Casey M.d.  Senior Resident: Dr. Teresa Estrada  Intern:  Dr. Prince Simental   Contact Number: [phone]    Chief Complaint  Jama Altman is a 68 y.o. male admitted 6/11/2024 with leg swelling and dizziness    Hospital Course  Jama Altman is a 68 y.o. 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 CAD s/p stent, renal transplant 2010 on chronic immunosuppressant therapy, (hx of polycystic kidney disease), HLD, HTN and PAD who presented was admitted due to hypovolemia in the setting of acute on chronic kidney injury and acute hypoxic failure.    At ED,  saturating 94% at 2 L of oxygen but afebrile.  CMP was remarkable for elevated BUN of 80 and elevated creatinine of 2.59.  Chest x-ray presented hypoinflation with bibasilar atelectasis/pneumonitis and small pleural effusion without significant changes compared to 5/28/24 chest x-ray.  At ED, patient received IV Lasix of 60 mg.   Nephrology has been following and recommended diuresing.  Has been treating with antibiotics for possible pneumonia.  D-dimer pending.  Depending on the results, may need further image to assess pulmonary embolism      Interval Problem Update  No acute overnight events  Procalcitonin has been trending down  Titrate up metoprolol from 25 mg to 50 mg SR  Nephrologist transition metolazone to  daily  V/Q ordered but cancled due to orthopnea.  D-dimer pending    Subjective  Patient stated that he is slightly feeling better.  He is worried about his shortness of breath which has been ongoing for past 3 months.  He still could not walk with long distance.  He is not ready to go home.  His sputum is now more clear     I have discussed this patient's plan of  care and discharge plan at IDT rounds today with Case Management, Nursing, Nursing leadership, and other members of the IDT team.    Consultants/Specialty  nephrology- Dr. Wilder     Code Status  Full Code    Disposition  The patient is not medically cleared for discharge to home or a post-acute facility.      I have placed the appropriate orders for post-discharge needs.    Review of Systems  Review of Systems   Constitutional:  Negative for chills and fever.   Respiratory:  Positive for cough and sputum production. Negative for shortness of breath and wheezing.    Cardiovascular:  Positive for orthopnea. Negative for chest pain and leg swelling.        Leg swelling improving   Gastrointestinal:  Negative for nausea and vomiting.   Neurological:  Positive for dizziness.        Dizziness when he walks        Physical Exam  Temp:  [35.9 °C (96.6 °F)-36.5 °C (97.7 °F)] 36.5 °C (97.7 °F)  Pulse:  [64-97] 70  Resp:  [17-19] 18  BP: (100-120)/(61-76) 100/61  SpO2:  [83 %-94 %] 92 %    Physical Exam  Vitals reviewed.   Constitutional:       General: He is not in acute distress.     Appearance: He is not ill-appearing, toxic-appearing or diaphoretic.   HENT:      Head: Normocephalic and atraumatic.      Mouth/Throat:      Mouth: Mucous membranes are moist.      Pharynx: Oropharynx is clear. No oropharyngeal exudate.   Eyes:      Extraocular Movements: Extraocular movements intact.      Pupils: Pupils are equal, round, and reactive to light.   Cardiovascular:      Rate and Rhythm: Normal rate. Rhythm irregular.      Heart sounds: No murmur heard.  Pulmonary:      Effort: Pulmonary effort is normal. No respiratory distress.   Abdominal:      General: Abdomen is flat. There is no distension.      Palpations: Abdomen is soft.      Tenderness: There is no abdominal tenderness. There is no guarding or rebound.   Musculoskeletal:         General: Normal range of motion.      Cervical back: Normal range of motion. No rigidity.       Right lower leg: No edema.      Left lower leg: No edema.      Comments: Swelling on left upper extremity, patient stated its chronic   Skin:     General: Skin is warm and dry.      Coloration: Skin is not jaundiced.      Findings: Lesion present.      Comments: lesion dressing applied on left axillary region  Metastatic cancer with necrosis   Neurological:      General: No focal deficit present.      Mental Status: He is alert and oriented to person, place, and time.      Cranial Nerves: No cranial nerve deficit.   Psychiatric:         Mood and Affect: Mood normal.         Behavior: Behavior normal.         Fluids    Intake/Output Summary (Last 24 hours) at 6/14/2024 1247  Last data filed at 6/14/2024 1200  Gross per 24 hour   Intake 380 ml   Output 2710 ml   Net -2330 ml       Laboratory  Recent Labs     06/12/24  0350 06/13/24 0228   WBC 5.2 5.0   RBC 4.49* 4.66*   HEMOGLOBIN 11.5* 11.7*   HEMATOCRIT 39.1* 40.4*   MCV 87.1 86.7   MCH 25.6* 25.1*   MCHC 29.4* 29.0*   RDW 51.9* 52.2*   PLATELETCT 61* 63*     Recent Labs     06/12/24  0350 06/13/24 0228 06/14/24  0544   SODIUM 138 137 140   POTASSIUM 5.1 5.2 4.7   CHLORIDE 104 101 102   CO2 21 23 24   GLUCOSE 133* 187* 202*   BUN 77* 75* 73*   CREATININE 2.47* 2.43* 2.34*   CALCIUM 9.0 9.4 9.2                   Imaging  DX-CHEST-2 VIEWS   Final Result      1.  Stable cardiomegaly and interstitial edema.      2.  Stable bibasilar atelectasis/consolidation and bilateral pleural effusions.      DX-CHEST-PORTABLE (1 VIEW)   Final Result         1.  Pulmonary edema and/or infiltrates, increased since prior study.   2.  Moderate bilateral layering pleural effusions   3.  Cardiomegaly   4.  Atherosclerosis      DX-CHEST-2 VIEWS   Final Result      Small effusions with bibasilar atelectasis/infiltrate. There is also mildly increased right upper lobe infiltrate.      DX-CHEST-PORTABLE (1 VIEW)   Final Result      Hypoinflation with bibasilar atelectasis/pneumonitis and  trace/small pleural effusions. No significant change.      NM-LUNG PERFUSION IMAGING    (Results Pending)         Assessment/Plan  Problem Representation:    * Acute respiratory failure with hypoxia (HCC)- (present on admission)  Assessment & Plan  Patient has been endorsing shortness of breath and orthopnea.  Hypervolemic on assessment.  Requiring 2 L oxygen at ED.  Chest x-ray presented hypoinflation with bibasilar atelectasis/pneumonitis and small pleural effusion without significant changes compared to 5/28/24 chest x-ray. 2 view CXR with RUL infiltrate, procal elevated 0.4 on admission   Likely due to fluid overload from acute kidney injury, possible pneumonia (high risk due to immunosuppression), less likely acute on chronic heart failure. MRSA nares positive March    (6/13/24) increased oxygen requirement overnight.  Two-view chest x-ray was unremarkable compared to yesterday.  Repeat procalcitonin trending down to 0.34.   (6/14/24) Pro-Randy trending down 0.25.  D-dimer pending. V/Q ordered but cancled due to orthopnea to assess PE  -Transition to oral Lasix 40 mg twice daily starting from 6/13/2024  - Metolazone 2.5mg daily  -Augmentin and azithromycin for possible CAP  -Sputum culture presented a few gram-positive gross  -If no improvement in oxygenation despite achieving euvolemia may broaden antibiotics       Hypervolemia  Assessment & Plan  Patient endorses 5 pounds weight gain past 3 weeks.  Has been following [facility].  Was sent to ED by recommended from nephrologist due to unresponsive p.o. Lasix.  -Nephrology consulted, see notes for further details but in short  - IV Lasix 40 mg BID, transition to oral Lasix 6/13  - Metolazone 2.5mg BID  -Fluid restriction to 1L, low salt diet  -Daily standing weights  -Daily renal panel with mag     Acute on chronic renal insufficiency- (present on admission)  Assessment & Plan  Baseline creatinine 1.7 - 2.0  Nephrology consulted  -Same plan as  hypervolemia    Lymphedema on LUE  Assessment & Plan  Chronic lymphedema on left upper extremity  US LUE 5/4/24 presented No gross evidence of left upper extremity DVT or SVT.   - CTM    HFmrEF  Assessment & Plan  Echocardiogram 5/30/24 presented LVEF of 45% with moderate concentric left ventricular hypertrophy.  Possibly, acute respiratory failure secondary to cardiorenal syndrome or acute on chronic heart failure but less likely   -Same plan as hypervolemia    Orthostatic dizziness  Assessment & Plan  Endorses worsening dizziness and lightheadedness when he is standing up from sitting up. Negative orthostatics by blood pressure but pulse does increase dramatically with ambulation  Has been tachycardic 160s by standing and walking.  (6/14) walking challenge presented patient only able to walk to the door and has significant dizziness with elevated heart rate 150s-160s.  -Fall risk precaution  -Increase metoprolol SR to 50 mg from 25 mg    Stage 4 squamous cell cancer of left axillary region complicated by left axillary abcscess s/p drainage- (present on admission)  Assessment & Plan  Has been followed by outpatient oncologist.   Per patient, he is currently on immunotherapy and last therapy was on last Friday (6/7/24)  -Continue home prednisone and Sirolimus  - Wound consult placed  -Continue home gabapentin    A-fib (HCC) s/p Watchman- (present on admission)  Assessment & Plan  - Increase metoprolol succinate to 50 mg 6/14/2024  - Telemetry monitoring     Type 2 diabetes mellitus (HCC)- (present on admission)  Assessment & Plan  Hem A1c 11.2 (5/23/24)  Home glargine 20 units  -Glargine 12 units  -SSI  -Hypoglycemia protocol      GERD (gastroesophageal reflux disease)- (present on admission)  Assessment & Plan  - Continue home omeprazole    Coronary artery disease s/p stent- (present on admission)  Assessment & Plan  -Continue home aspirin and atorvastatin    Hyperlipidemia- (present on admission)  Assessment &  Plan  -Continue home atorvastatin         VTE prophylaxis: heparin ppx    I have performed a physical exam and reviewed and updated ROS and Plan today (6/14/2024). In review of yesterday's note (6/13/2024), there are no changes except as documented above.

## 2024-06-14 NOTE — PROGRESS NOTES
"[facility] -  PROGRESS NOTE               Author: Fish Wilder M.D. Date & Time: 6/14/2024  11:01 AM     HPI:  Patient is a 68 year old male with a PMHx of afib, CAD, renal transplant in 2010, PKD, HLD, HTN, and squamous cell carcinoma of the left axillary region on immunotherapy via Dr. Fajardo, DM with last A1c of 6.9%, who presented to hospital for SOB from outpatient nephrology clinic.  Pt also has orthopnea and significant lower extremity edema.  He is currently on sirolimus and pred for his transplant. Scr from 6/5 was 2.89 and his baseline is around 1.5-1.9. Nephrology was asked to consult for SANKET. Scr in the ER was 2.56. He states his SOB has been worsening over the past 3 weeks.      No F/C/N/V/CP/He does have SOB.  No melena, hematochezia, hematemesis.  No HA, visual changes, or abdominal pain. + edema bilateral LE    DAILY NEPHROLOGY SUMMARY:  6/12 - No new overnight renal events. Scr is 2.47 today. Diuresing well.   6/13 - Scr is stable. Good UOP. Feels better.   6/14 - Scr continues to head in the right direction. 2.34. HR 88-96 HR and then spikes to 160+ when standing or ambulating.      REVIEW OF SYSTEMS:    10 point ROS reviewed and is as per HPI/daily summary or otherwise negative    PMH/PSH/SH/FH:   Reviewed and unchanged since admission note    CURRENT MEDICATIONS:   Reviewed from admission to present day    VS:  /76   Pulse 92   Temp 36.5 °C (97.7 °F) (Temporal)   Resp 17   Ht 1.981 m (6' 6\")   Wt 99.8 kg (220 lb 0.3 oz)   SpO2 92%   BMI 25.43 kg/m²     Physical Exam  Vitals and nursing note reviewed.     Constitutional:       General: He is not in acute distress.     Appearance: He is obese. He is not ill-appearing.   HENT:      Head: Normocephalic and atraumatic.      Nose: Nose normal.      Mouth/Throat:      Mouth: Mucous membranes are moist.      Pharynx: Oropharynx is clear.   Eyes:      Extraocular Movements: Extraocular movements intact.      " Conjunctiva/sclera: Conjunctivae normal.      Pupils: Pupils are equal, round, and reactive to light.   Cardiovascular:      Rate and Rhythm: Normal rate and regular rhythm.   Pulmonary:      Effort: No respiratory distress.      Breath sounds: minimal tanya crackles present.   Abdominal:      General: Abdomen is flat. Bowel sounds are normal.      Palpations: Abdomen is soft.   Musculoskeletal:      Cervical back: Normal range of motion and neck supple.      Right lower leg: Edema present.      Left lower leg: Edema present.   Neurological:      General: No focal deficit present.      Mental Status: He is alert and oriented to person, place, and time. Mental status is at baseline.   Psychiatric:         Mood and Affect: Mood normal.         Behavior: Behavior normal.         Thought Content: Thought content normal.         Judgment: Judgment normal.     Fluids:  In: 100 [P.O.:100]  Out: 2860     LABS:  Recent Labs     06/12/24  0350 06/13/24  0228 06/14/24  0544   SODIUM 138 137 140   POTASSIUM 5.1 5.2 4.7   CHLORIDE 104 101 102   CO2 21 23 24   GLUCOSE 133* 187* 202*   BUN 77* 75* 73*   CREATININE 2.47* 2.43* 2.34*   CALCIUM 9.0 9.4 9.2       IMAGING:   All imaging reviewed from admission to present day    IMPRESSION:  # SANKET    - Improving since last Scr of 2.89 from 6/5/24. Baseline Scr is ~1.9  # Renal transplant    - Continue with home IS medications  # h/o ADPKD  # Mild hyperkalemia  # Acute pulmonary edema    - Agree with diuresis - Lasix 40 mg IV BID along with Metolazone 2.5 mg PO BID  # Desaturation to 85% as outpatient  # Acidosis  # Chronic Immunosuppression on medications (sirolimus and prednisone)  # Type 2 DM - last A1c was 6.9%  # Proteinuria  # Atrial fibrillation  # Vit d deficiency  # Squamous Cell Ca - getting immunotherapy/followed by Dr. Fajardo. He has mets to lymph nodes.   # Anemia      PLAN:  - There is no acute need for RRT. V/Q scan ok but please avoid IV contrast procedures.   - Creatinine  down-trending. He does not want dialysis if it comes to it.   - Continue PO furosemide. Change metolazone 2.5mg  to Qdaily  - Low salt diet  - Continue with home IS medications  - Dose all meds per eGFR   - Rest of management per primary team

## 2024-06-14 NOTE — CARE PLAN
The patient is Stable - Low risk of patient condition declining or worsening    Shift Goals  Clinical Goals: Wound care, monitor/manage o2 demand, provide emotional support, comf  Patient Goals: Wound care, respiratory comfort  Family Goals: Plan of care    Progress made toward(s) clinical / shift goals:    Problem: Knowledge Deficit - Standard  Goal: Patient and family/care givers will demonstrate understanding of plan of care, disease process/condition, diagnostic tests and medications  Description: Target End Date:  1-3 days or as soon as patient condition allows    Document in Patient Education    1.  Patient and family/caregiver oriented to unit, equipment, visitation policy and means for communicating concern  2.  Complete/review Learning Assessment  3.  Assess knowledge level of disease process/condition, treatment plan, diagnostic tests and medications  4.  Explain disease process/condition, treatment plan, diagnostic tests and medications  Outcome: Progressing  Note: Plan of care discussed with patient and patient fiance during bedside assessment, including prescribed medication regimen, sputum sample collection, and wound care time for this morning.  Patient also educated on continued monitoring of heart rate and rhythm, along with oxygen demand.  Patient stating understanding and compliance with goals of care     Problem: Pain - Standard  Goal: Alleviation of pain or a reduction in pain to the patient’s comfort goal  Description: Target End Date:  Prior to discharge or change in level of care    Document on Vitals flowsheet    1.  Document pain using the appropriate pain scale per order or unit policy  2.  Educate and implement non-pharmacologic comfort measures (i.e. relaxation, distraction, massage, cold/heat therapy, etc.)  3.  Pain management medications as ordered  4.  Reassess pain after pain med administration per policy  5.  If opiods administered assess patient's response to pain medication is  appropriate per POSS sedation scale  6.  Follow pain management plan developed in collaboration with patient and interdisciplinary team (including palliative care or pain specialists if applicable)  Outcome: Progressing  Note: PRN pain medications in use for pain control.       Problem: Skin Integrity - Diabetes  Goal: Patient's skin on legs and feet will remain intact while hospitalized  Description: Target End Date:  Prior to discharge or change in level of care    1. Examine lower extremities for skin lesions  2. Neurovascular assessment of lower extremities including sensation, temperature of skin, capillary refill  3. Wash feet daily with mild soap and water  Note: Skin care completed this morning per wound care nurse orders       Patient is not progressing towards the following goals:

## 2024-06-14 NOTE — PROGRESS NOTES
Monitor Summary:  Rhythm: A fib  Rate: 88-96 HR spikes to 160+ when standing or ambulating  Ectopy: PVC, trig, big, coup    -/.12/-

## 2024-06-14 NOTE — PROGRESS NOTES
Patient's HR is continually noted to spike up to 160-180 whenever the patient is ambulating or even standing to use the urinal. Patient does not have any complaints of chest pain, dyspnea, and his vital signs remain stable, remaining A&Ox4. MD notified at this time.

## 2024-06-14 NOTE — CARE PLAN
The patient is Watcher - Medium risk of patient condition declining or worsening    Shift Goals  Clinical Goals: Wound care, diuresis, PO abx, monitor HR  Patient Goals: comfort, ambulation as tolerated  Family Goals: Updated on plan of care    Progress made toward(s) clinical / shift goals:    Problem: Diabetes Management  Goal: Patient will achieve and maintain glucose in satisfactory range  Outcome: Progressing  Note: Patient has complied with his insulin regimen while in the hospital and has a good understanding of his diabetes and how long acting and rapid acting insulin are utilized to control his blood sugars.      Problem: Respiratory  Goal: Patient will achieve/maintain optimum respiratory ventilation and gas exchange  Outcome: Progressing  Flowsheets  Taken 6/14/2024 0217  O2 Delivery Device: Nasal Cannula  Incentive Spirometer: Effective  Taken 6/13/2024 1900  Deep Breathe and Cough: Performs Correctly  Note: Patient is not yet at his baseline room air, as he currently is on 1.5 L via NC. Patient does not complain of any dyspnea at rest and is able to ambulate in the room without any signs/symptoms of respiratory distress.

## 2024-06-15 LAB
ALBUMIN SERPL BCP-MCNC: 3 G/DL (ref 3.2–4.9)
ALBUMIN/GLOB SERPL: 1.1 G/DL
ALP SERPL-CCNC: 76 U/L (ref 30–99)
ALT SERPL-CCNC: 17 U/L (ref 2–50)
ANION GAP SERPL CALC-SCNC: 14 MMOL/L (ref 7–16)
AST SERPL-CCNC: 19 U/L (ref 12–45)
BASOPHILS # BLD AUTO: 0.3 % (ref 0–1.8)
BASOPHILS # BLD: 0.03 K/UL (ref 0–0.12)
BILIRUB SERPL-MCNC: 0.4 MG/DL (ref 0.1–1.5)
BUN SERPL-MCNC: 71 MG/DL (ref 8–22)
CALCIUM ALBUM COR SERPL-MCNC: 9.9 MG/DL (ref 8.5–10.5)
CALCIUM SERPL-MCNC: 9.1 MG/DL (ref 8.5–10.5)
CHLORIDE SERPL-SCNC: 100 MMOL/L (ref 96–112)
CO2 SERPL-SCNC: 25 MMOL/L (ref 20–33)
CREAT SERPL-MCNC: 2.45 MG/DL (ref 0.5–1.4)
EOSINOPHIL # BLD AUTO: 0.02 K/UL (ref 0–0.51)
EOSINOPHIL NFR BLD: 0.2 % (ref 0–6.9)
ERYTHROCYTE [DISTWIDTH] IN BLOOD BY AUTOMATED COUNT: 50.2 FL (ref 35.9–50)
GFR SERPLBLD CREATININE-BSD FMLA CKD-EPI: 28 ML/MIN/1.73 M 2
GLOBULIN SER CALC-MCNC: 2.7 G/DL (ref 1.9–3.5)
GLUCOSE BLD STRIP.AUTO-MCNC: 191 MG/DL (ref 65–99)
GLUCOSE BLD STRIP.AUTO-MCNC: 202 MG/DL (ref 65–99)
GLUCOSE BLD STRIP.AUTO-MCNC: 207 MG/DL (ref 65–99)
GLUCOSE SERPL-MCNC: 293 MG/DL (ref 65–99)
HCT VFR BLD AUTO: 41.8 % (ref 42–52)
HGB BLD-MCNC: 12.6 G/DL (ref 14–18)
IMM GRANULOCYTES # BLD AUTO: 0.15 K/UL (ref 0–0.11)
IMM GRANULOCYTES NFR BLD AUTO: 1.6 % (ref 0–0.9)
LDH SERPL L TO P-CCNC: 321 U/L (ref 107–266)
LYMPHOCYTES # BLD AUTO: 0.2 K/UL (ref 1–4.8)
LYMPHOCYTES NFR BLD: 2.2 % (ref 22–41)
MCH RBC QN AUTO: 25.4 PG (ref 27–33)
MCHC RBC AUTO-ENTMCNC: 30.1 G/DL (ref 32.3–36.5)
MCV RBC AUTO: 84.1 FL (ref 81.4–97.8)
MONOCYTES # BLD AUTO: 0.68 K/UL (ref 0–0.85)
MONOCYTES NFR BLD AUTO: 7.4 % (ref 0–13.4)
NEUTROPHILS # BLD AUTO: 8.1 K/UL (ref 1.82–7.42)
NEUTROPHILS NFR BLD: 88.3 % (ref 44–72)
NRBC # BLD AUTO: 0 K/UL
NRBC BLD-RTO: 0 /100 WBC (ref 0–0.2)
NT-PROBNP SERPL IA-MCNC: 7419 PG/ML (ref 0–125)
PLATELET # BLD AUTO: 64 K/UL (ref 164–446)
PLATELETS.RETICULATED NFR BLD AUTO: 16.3 % (ref 0.6–13.1)
POTASSIUM SERPL-SCNC: 4.7 MMOL/L (ref 3.6–5.5)
PROT SERPL-MCNC: 5.7 G/DL (ref 6–8.2)
RBC # BLD AUTO: 4.97 M/UL (ref 4.7–6.1)
SODIUM SERPL-SCNC: 139 MMOL/L (ref 135–145)
UFH PPP CHRO-ACNC: >1.1 IU/ML
WBC # BLD AUTO: 9.2 K/UL (ref 4.8–10.8)

## 2024-06-15 PROCEDURE — 700102 HCHG RX REV CODE 250 W/ 637 OVERRIDE(OP)

## 2024-06-15 PROCEDURE — 770020 HCHG ROOM/CARE - TELE (206)

## 2024-06-15 PROCEDURE — 85520 HEPARIN ASSAY: CPT | Mod: 91

## 2024-06-15 PROCEDURE — 83880 ASSAY OF NATRIURETIC PEPTIDE: CPT

## 2024-06-15 PROCEDURE — A9270 NON-COVERED ITEM OR SERVICE: HCPCS

## 2024-06-15 PROCEDURE — 99233 SBSQ HOSP IP/OBS HIGH 50: CPT | Mod: GC | Performed by: INTERNAL MEDICINE

## 2024-06-15 PROCEDURE — 82962 GLUCOSE BLOOD TEST: CPT

## 2024-06-15 PROCEDURE — A9270 NON-COVERED ITEM OR SERVICE: HCPCS | Performed by: INTERNAL MEDICINE

## 2024-06-15 PROCEDURE — 36415 COLL VENOUS BLD VENIPUNCTURE: CPT

## 2024-06-15 PROCEDURE — 99223 1ST HOSP IP/OBS HIGH 75: CPT | Performed by: INTERNAL MEDICINE

## 2024-06-15 PROCEDURE — 87449 NOS EACH ORGANISM AG IA: CPT

## 2024-06-15 PROCEDURE — 700102 HCHG RX REV CODE 250 W/ 637 OVERRIDE(OP): Performed by: INTERNAL MEDICINE

## 2024-06-15 PROCEDURE — 85025 COMPLETE CBC W/AUTO DIFF WBC: CPT

## 2024-06-15 PROCEDURE — 83615 LACTATE (LD) (LDH) ENZYME: CPT

## 2024-06-15 PROCEDURE — 99222 1ST HOSP IP/OBS MODERATE 55: CPT | Performed by: INTERNAL MEDICINE

## 2024-06-15 PROCEDURE — 700111 HCHG RX REV CODE 636 W/ 250 OVERRIDE (IP)

## 2024-06-15 PROCEDURE — 97597 DBRDMT OPN WND 1ST 20 CM/<: CPT

## 2024-06-15 PROCEDURE — 85055 RETICULATED PLATELET ASSAY: CPT

## 2024-06-15 RX ORDER — LEVOFLOXACIN 500 MG/1
250 TABLET, FILM COATED ORAL EVERY 24 HOURS
Status: DISCONTINUED | OUTPATIENT
Start: 2024-06-16 | End: 2024-06-17

## 2024-06-15 RX ORDER — INSULIN LISPRO 100 [IU]/ML
2-9 INJECTION, SOLUTION INTRAVENOUS; SUBCUTANEOUS
Status: DISCONTINUED | OUTPATIENT
Start: 2024-06-15 | End: 2024-06-21 | Stop reason: HOSPADM

## 2024-06-15 RX ORDER — LEVOFLOXACIN 500 MG/1
500 TABLET, FILM COATED ORAL ONCE
Status: COMPLETED | OUTPATIENT
Start: 2024-06-15 | End: 2024-06-15

## 2024-06-15 RX ORDER — LEVOFLOXACIN 500 MG/1
500 TABLET, FILM COATED ORAL EVERY 24 HOURS
Status: DISCONTINUED | OUTPATIENT
Start: 2024-06-15 | End: 2024-06-15

## 2024-06-15 RX ORDER — METOPROLOL SUCCINATE 25 MG/1
75 TABLET, EXTENDED RELEASE ORAL DAILY
Status: DISCONTINUED | OUTPATIENT
Start: 2024-06-16 | End: 2024-06-16

## 2024-06-15 RX ORDER — METOPROLOL SUCCINATE 25 MG/1
25 TABLET, EXTENDED RELEASE ORAL ONCE
Status: COMPLETED | OUTPATIENT
Start: 2024-06-15 | End: 2024-06-15

## 2024-06-15 RX ORDER — INSULIN LISPRO 100 [IU]/ML
1-6 INJECTION, SOLUTION INTRAVENOUS; SUBCUTANEOUS
Status: DISCONTINUED | OUTPATIENT
Start: 2024-06-15 | End: 2024-06-15

## 2024-06-15 RX ADMIN — OMEPRAZOLE 20 MG: 20 CAPSULE, DELAYED RELEASE ORAL at 04:33

## 2024-06-15 RX ADMIN — GUAIFENESIN 600 MG: 600 TABLET, EXTENDED RELEASE ORAL at 16:45

## 2024-06-15 RX ADMIN — HEPARIN SODIUM 9 UNITS/KG/HR: 5000 INJECTION, SOLUTION INTRAVENOUS at 12:26

## 2024-06-15 RX ADMIN — GUAIFENESIN 600 MG: 600 TABLET, EXTENDED RELEASE ORAL at 08:52

## 2024-06-15 RX ADMIN — METOLAZONE 2.5 MG: 2.5 TABLET ORAL at 04:33

## 2024-06-15 RX ADMIN — AMOXICILLIN AND CLAVULANATE POTASSIUM 1 TABLET: 875; 125 TABLET, FILM COATED ORAL at 04:33

## 2024-06-15 RX ADMIN — FUROSEMIDE 40 MG: 40 TABLET ORAL at 04:33

## 2024-06-15 RX ADMIN — ACETAMINOPHEN 650 MG: 325 TABLET, FILM COATED ORAL at 04:32

## 2024-06-15 RX ADMIN — ATORVASTATIN CALCIUM 80 MG: 80 TABLET, FILM COATED ORAL at 19:50

## 2024-06-15 RX ADMIN — FUROSEMIDE 40 MG: 40 TABLET ORAL at 16:45

## 2024-06-15 RX ADMIN — GABAPENTIN 400 MG: 400 CAPSULE ORAL at 04:33

## 2024-06-15 RX ADMIN — ACETAMINOPHEN 650 MG: 325 TABLET, FILM COATED ORAL at 16:45

## 2024-06-15 RX ADMIN — INSULIN LISPRO 3 UNITS: 100 INJECTION, SOLUTION INTRAVENOUS; SUBCUTANEOUS at 09:51

## 2024-06-15 RX ADMIN — INSULIN LISPRO 2 UNITS: 100 INJECTION, SOLUTION INTRAVENOUS; SUBCUTANEOUS at 14:12

## 2024-06-15 RX ADMIN — MOMETASONE FUROATE AND FORMOTEROL FUMARATE DIHYDRATE 2 PUFF: 200; 5 AEROSOL RESPIRATORY (INHALATION) at 04:32

## 2024-06-15 RX ADMIN — GABAPENTIN 400 MG: 400 CAPSULE ORAL at 16:45

## 2024-06-15 RX ADMIN — METOPROLOL SUCCINATE 25 MG: 25 TABLET, EXTENDED RELEASE ORAL at 09:50

## 2024-06-15 RX ADMIN — PREDNISONE 20 MG: 20 TABLET ORAL at 04:33

## 2024-06-15 RX ADMIN — LEVOFLOXACIN 500 MG: 500 TABLET, FILM COATED ORAL at 16:45

## 2024-06-15 RX ADMIN — METOPROLOL SUCCINATE 50 MG: 25 TABLET, EXTENDED RELEASE ORAL at 08:50

## 2024-06-15 RX ADMIN — INSULIN LISPRO 2 UNITS: 100 INJECTION, SOLUTION INTRAVENOUS; SUBCUTANEOUS at 19:48

## 2024-06-15 RX ADMIN — SIROLIMUS 4 MG: 0.5 TABLET ORAL at 04:33

## 2024-06-15 RX ADMIN — ACETAMINOPHEN 650 MG: 325 TABLET, FILM COATED ORAL at 23:04

## 2024-06-15 RX ADMIN — ASPIRIN 81 MG: 81 TABLET, COATED ORAL at 04:32

## 2024-06-15 RX ADMIN — MOMETASONE FUROATE AND FORMOTEROL FUMARATE DIHYDRATE 2 PUFF: 200; 5 AEROSOL RESPIRATORY (INHALATION) at 17:25

## 2024-06-15 ASSESSMENT — ENCOUNTER SYMPTOMS
NAUSEA: 0
SHORTNESS OF BREATH: 0
HEMOPTYSIS: 0
CHILLS: 0
FEVER: 0
ORTHOPNEA: 1
DIZZINESS: 1
WHEEZING: 0
VOMITING: 0
SPUTUM PRODUCTION: 1
COUGH: 1

## 2024-06-15 ASSESSMENT — PAIN DESCRIPTION - PAIN TYPE
TYPE: ACUTE PAIN

## 2024-06-15 ASSESSMENT — FIBROSIS 4 INDEX: FIB4 SCORE: 4.9

## 2024-06-15 NOTE — CONSULTS
Cardiology Consult Note    DOS: 6/15/2024    Consulting physician: Prince Simental    Chief complaint/Reason for consult: AF with RVR    HPI:  Pt is a 69 yo M. HIstor yof CAD s/p prior MI s/p prior PCI, HTN, T2DM, HTN, permanent AF s/p WATCHMAN, PKD s/p kidney transplant, squamous cell ca, metastatic and immunotherapy now. Presents with SOB, hypoxic respiratory failure, SANKET, found to be volume overloaded. Diuresis under direction of nephrology. Being treated for PNA also. His ventricular rates have been on high side. Cardiology asked for recommendations for rate control. Being treated empirically for PE with IV heparin gtt.    ROS (+ highlighted in red):  Constitutional: Fevers/chills/fatigue/weightloss  HEENT: Blurry vision/eye pain/sore throat/hearing loss  Respiratory: Shortness of breath/cough  Cardiovascular: Chest pain/palpitations/edema/orthopnea/syncope  GI: Nausea/vomitting/diarrhea  MSK: Arthralgias/myagias/muscle weakness  Skin: Rash/sores  Neurological: Numbness/tremors/vertigo  Endocrine: Excessive thirst/polyuria/cold intolerance/heat intolerance  Psych: Depression/anxiety    Past Medical History:   Diagnosis Date    A-fib (HCC)     Arrhythmia     Benign essential hypertension     Diabetes (HCC)     type 2    Heart burn     Hemorrhagic disorder (HCC)     Eliquis    Hyperlipoproteinemia     Hypertension     not on meds anymore    Indigestion     Myocardial infarct (HCC) 2013    stent    Polycystic kidney 09/10/2010    RIGHT KIDNEY TRANSPLANT    Presence of Watchman left atrial appendage closure device 02/29/2024    Sleep apnea 01/30/2024    states he was told he had sleep apnea but has not had a sleep study    Snoring        Past Surgical History:   Procedure Laterality Date    STENT PLACEMENT  2013    cardiac    KNEE MANIPULATION  02/16/2012    Performed by LATOYA CONNER at SURGERY [facility]    KNEE UNICOMPARTMENTAL  12/23/2011    Performed by LATOYA CONNER at SURGERY [facility]    KNEE  ARTHROSCOPY  12/23/2011    Performed by LATOYA CONNER at SURGERY [facility]    KNEE ARTHROSCOPY  05/03/2011    Performed by HANANE GOLDMAN at SURGERY SAME DAY [facility]    MENISCECTOMY, KNEE, MEDIAL  05/03/2011    Performed by HANANE GOLDMAN at SURGERY SAME DAY [facility]    OTHER  09/10/2010    RIGHT KIDNEY TRANSPLANT    KNEE ARTHROPLASTY TOTAL  01/12/2007    RIGHT    KNEE ARTHROSCOPY  04/10/2006    RIGHT    OTHER ORTHOPEDIC SURGERY  07/08/1974    LEFT KNEE DEBRIDEMENT       Social History     Socioeconomic History    Marital status: Single     Spouse name: Not on file    Number of children: Not on file    Years of education: Not on file    Highest education level: Not on file   Occupational History    Not on file   Tobacco Use    Smoking status: Never     Passive exposure: Never    Smokeless tobacco: Never   Vaping Use    Vaping status: Never Used   Substance and Sexual Activity    Alcohol use: No    Drug use: No    Sexual activity: Yes     Partners: Female   Other Topics Concern    Not on file   Social History Narrative    Not on file     Social Determinants of Health     Financial Resource Strain: Not on file   Food Insecurity: No Food Insecurity (6/11/2024)    Hunger Vital Sign     Worried About Running Out of Food in the Last Year: Never true     Ran Out of Food in the Last Year: Never true   Transportation Needs: No Transportation Needs (6/11/2024)    PRAPARE - Transportation     Lack of Transportation (Medical): No     Lack of Transportation (Non-Medical): No   Physical Activity: Not on file   Stress: Not on file   Social Connections: Not on file   Intimate Partner Violence: Not At Risk (6/11/2024)    Humiliation, Afraid, Rape, and Kick questionnaire     Fear of Current or Ex-Partner: No     Emotionally Abused: No     Physically Abused: No     Sexually Abused: No   Housing Stability: Low Risk  (6/11/2024)    Housing Stability Vital Sign     Unable to Pay for Housing in the Last Year: No      Number of Places Lived in the Last Year: 1     Unstable Housing in the Last Year: No       History reviewed. No pertinent family history.    Allergies   Allergen Reactions    Doxycycline Rash     Sweats and shakes: 9/28/17: Clarified allergy with patient. Allergy was in 1998 and he doesn't remember what happened. He thought the medication is for pain.  Tolerates doxycycline 9/2017       Current Facility-Administered Medications   Medication Dose Route Frequency Provider Last Rate Last Admin    insulin GLARGINE (Lantus,Semglee) injection  14 Units Subcutaneous Q EVENING Prince MONIKA Simental D.O.        And    dextrose 10 % BOLUS 25 g  25 g Intravenous Q15 MIN PRN Prince MONIKA Simental D.O.        insulin lispro (HumaLOG,AdmeLOG) injection  2-9 Units Subcutaneous 4X/DAY ACHS Prince MONIKA Simental D.O.   3 Units at 06/15/24 0951    [START ON 6/16/2024] metoprolol SR (Toprol XL) tablet 75 mg  75 mg Oral DAILY Prince MONIKA Simental D.O.        guaiFENesin ER (Mucinex) tablet 600 mg  600 mg Oral Q12HRS Teresa Estrada M.D.   600 mg at 06/15/24 0852    metOLazone (Zaroxolyn) tablet 2.5 mg  2.5 mg Oral Q DAY Fish Wilder M.D.   2.5 mg at 06/15/24 0433    heparin infusion 25,000 units in 500 mL 0.45% NACL  0-30 Units/kg/hr Intravenous Continuous Prince MONIKA Simental D.O. 18 mL/hr at 06/15/24 1226 9 Units/kg/hr at 06/15/24 1226    furosemide (Lasix) tablet 40 mg  40 mg Oral BID DIURETIC Fish Wilder M.D.   40 mg at 06/15/24 0433    sodium chloride (Ocean) 0.65 % nasal spray 2 Spray  2 Spray Nasal Q2HRS PRN Maria Guadalupe Green D.O.   2 Chase at 06/13/24 0513    acetaminophen (Tylenol) tablet 650 mg  650 mg Oral Q6HRS PRN Prince MONIKA Simental D.O.   650 mg at 06/15/24 0432    Pharmacy Consult Request ...Pain Management Review 1 Each  1 Each Other PHARMACY TO DOSE Prince MONIKA Simental D.O.        oxyCODONE immediate-release (Roxicodone) tablet 2.5 mg  2.5 mg Oral Q3HRS PRN Teresa Estrada M.D.        Or    oxyCODONE immediate-release (Roxicodone) tablet 5 mg  5 mg Oral Q3HRS PRN  Teresa Estrada M.D.   5 mg at 06/14/24 2146    senna-docusate (Pericolace Or Senokot S) 8.6-50 MG per tablet 2 Tablet  2 Tablet Oral Q EVENING Prince MONIKA Simental D.O.        And    polyethylene glycol/lytes (Miralax) Packet 1 Packet  1 Packet Oral QDAY PRN Prince MONIKA Simental D.O.        atorvastatin (Lipitor) tablet 80 mg  80 mg Oral QHS NGOC LoboORudi   80 mg at 06/14/24 2146    benzonatate (Tessalon) capsule 200 mg  200 mg Oral TID PRN NGOC LoboORudi   200 mg at 06/13/24 0521    gabapentin (Neurontin) capsule 400 mg  400 mg Oral BID NGOC LoboORudi   400 mg at 06/15/24 0433    omeprazole (PriLOSEC) capsule 20 mg  20 mg Oral DAILY NGOC LoboORudi   20 mg at 06/15/24 0433    predniSONE (Deltasone) tablet 20 mg  20 mg Oral DAILY NGOC LoboORudi   20 mg at 06/15/24 0433    sirolimus (Rapamune) tablet 4 mg  4 mg Oral DAILY NGOC LoboORudi   4 mg at 06/15/24 0433    mometasone-formoterol (Dulera) 200-5 MCG/ACT inhaler 2 Puff  2 Puff Inhalation BID Charlie Casey M.D.   2 Puff at 06/15/24 0432    aspirin EC tablet 81 mg  81 mg Oral DAILY NGOC LoboO.   81 mg at 06/15/24 0432       Physical Exam:  Vitals:    06/15/24 0457 06/15/24 0722 06/15/24 0949 06/15/24 1124   BP:  111/75 113/68 104/70   Pulse:  91  93   Resp:  16  16   Temp:  36.6 °C (97.9 °F)  36.5 °C (97.7 °F)   TempSrc:  Temporal  Temporal   SpO2:  90%  95%   Weight: 98.5 kg (217 lb 2.5 oz)      Height:         General appearance: NAD, conversant   Eyes: anicteric sclerae, moist conjunctivae; no lid-lag; PERRLA  HENT: Atraumatic; oropharynx clear with moist mucous membranes and no mucosal ulcerations; normal hard and soft palate  Neck: Trachea midline; FROM, supple, no thyromegaly or lymphadenopathy  Lungs: Decreased bibasilar BS  CV: irregularly irregular, 2/6 murmur, no JVD   Abdomen: Soft, non-tender; no masses or HSM  Extremities: No peripheral edema (compression stockings on) or extremity lymphadenopathy  Skin: Normal temperature, turgor and  texture; no rash, ulcers or subcutaneous nodules  Psych: Appropriate affect, alert and oriented to person, place and time    Data:  Labs reviewed    Prior echo/stress results reviewed:  LVEF ~50% visually    CXR interpreted by me:  Marked interstitial edema    EKG interpreted by me:   AF    Impression/Plan:  1) Permanent AF s/p WATCHMAN  2) Volume overload  3) ?PNA  4) SANKET    -His resting ventricular rates are not really any different than his baseline  -I think he has RVR with exertion as he still is volume overloaded and significant component of pulmonary edema and respiratory failure as he is still unable to lay flat with orthopnea  -Most of the time he is not exerting himself  -I do not think he has significantly inadequately controlled ventricular rates, but not unreasonable to push BB further as max metop is 200 daily  -I do not think he is good candidate for device therapy given non-healing wounds and immunosuppression  -Amio always an option but seems overkill right now    Maia Barclay MD

## 2024-06-15 NOTE — CARE PLAN
The patient is Stable - Low risk of patient condition declining or worsening    Shift Goals  Clinical Goals: wound care, monitor heparin gtt  Patient Goals: comfort, rest  Family Goals: speak with MD    Progress made toward(s) clinical / shift goals:    Problem: Pain - Standard  Goal: Alleviation of pain or a reduction in pain to the patient’s comfort goal  Description: Target End Date:  Prior to discharge or change in level of care    Document on Vitals flowsheet    1.  Document pain using the appropriate pain scale per order or unit policy  2.  Educate and implement non-pharmacologic comfort measures (i.e. relaxation, distraction, massage, cold/heat therapy, etc.)  3.  Pain management medications as ordered  4.  Reassess pain after pain med administration per policy  5.  If opiods administered assess patient's response to pain medication is appropriate per POSS sedation scale  6.  Follow pain management plan developed in collaboration with patient and interdisciplinary team (including palliative care or pain specialists if applicable)  Outcome: Progressing  Note: Patient received pain meds prior to wound care.       Problem: Discharge Planning - Diabetes  Goal: Patient's continuum of care needs will be met  Description: Target End Date:  Prior to discharge or change in level of care    1.  Identify potential discharge barriers on admission and throughout hospital stay  2.  Collaborate with Diabetes Educator, Case Management,  for discharge needs  3.  Involve patient and family/caregiver in discharge process  4.  Ensure patient and family/caregiver demonstrate understanding of diabetes education including signs and symptoms of hyper/hypoglycemia  5.  Ensure patient and family/caregiver demonstrate proper use of equipment - finger stick device, glucometer and Insulin  6.  Ensure flu and pneumonia vaccinations are addressed  7.  Ensure discharge medications and supplies are ordered and verify all are  delivered by pharmacy  Outcome: Progressing  Note: Patient treated with sliding scale insulin at dinner along with his long acting. Patient refused bedtime sugar check and coverage.      Problem: Urinary Elimination:  Goal: Ability to achieve and maintain adequate renal perfusion and functioning will improve  Outcome: Progressing  Note: Patient is able to void and maintains appropriate output through the shift.      Problem: Care Map:  Day 1 Optimal Outcome for the Heart Failure Patient  Goal: Day 1:  Optimal Care of the heart failure patient  Description: Target End Date:  end of day 1  Outcome: Progressing  Note: Reviewed heart failure education with patient.         Patient is not progressing towards the following goals:      Problem: Skin Integrity  Goal: Skin integrity is maintained or improved  Description: Target End Date:  Prior to discharge or change in level of care    Document interventions on Skin Risk/Javad flowsheet groups and corresponding LDA    1.  Assess and monitor skin integrity, appearance and/or temperature  2.  Assess risk factors for impaired skin integrity and/or pressures ulcers  3.  Implement precautions to protect skin integrity in collaboration with interdisciplinary team  4.  Implement pressure ulcer prevention protocol if at risk for skin breakdown  5.  Confirm wound care consult if at risk for skin breakdown  6.  Ensure patient use of pressure relieving devices  (Low air loss bed, waffle overlay, heel protectors, ROHO cushion, etc)  Outcome: Not Progressing  Note: Patient is able to participate and guide wound care for left axillary.

## 2024-06-15 NOTE — PROGRESS NOTES
Monitor Summary:    Rhythm: A.Fib  HR , X2 events of HR increasing to 160-180s.  Ectopy: Rare PCS, [place]  -/.13/-    Per strip from monitor room

## 2024-06-15 NOTE — CARE PLAN
The patient is Stable - Low risk of patient condition declining or worsening    Shift Goals  Clinical Goals: chest comfort, wound care, monitor O2  Patient Goals: comfort  Family Goals: speak with MD    Progress made toward(s) clinical / shift goals:      Patient is not progressing towards the following goals:    Patient alert and oriented x4, able to communicate needs effectively with use of call light. Complains of aching pain to L axilla, medicated with PRN Tylenol per MAR. Wound care completed this shift per orders. Patient experiencing increasing chest discomfort, unable to complete lung imaging secondary to intolerance of laying flat. Heparin gtt initiated per MD orders, patient educated about care/management, verbalizes frustration however understands. Voiding adequately, x2 BM this shift per patient.       Problem: Pain - Standard  Goal: Alleviation of pain or a reduction in pain to the patient’s comfort goal  Outcome: Progressing     Problem: Respiratory  Goal: Patient will achieve/maintain optimum respiratory ventilation and gas exchange  Outcome: Progressing     Problem: Urinary Elimination:  Goal: Ability to achieve and maintain adequate renal perfusion and functioning will improve  Outcome: Progressing

## 2024-06-15 NOTE — CONSULTS
"INFECTIOUS DISEASES INPATIENT CONSULT NOTE     Date of Service:6/15/24    Consult Requested By: Charlie Casey M.D.    Reason for Consultation: treatment pneumocystitis    History of Present Illness:   Jama Altman is a 68 y.o. male admitted 6/11/2024 for worsening dyspnea and cough  Known Afib, watchman procedure,  CAD, renal transplant 2010 (hx of polycystic kidney disease), HLD, HTN and PAD.  On heparin gtt empirically  On Augmentin empirically  States cough removed but remains SOB at rest and cannot lie flat  Also c/o not able to sleep here  Known to ID from nonhealing left axillary wound/abscess from cancer:  Last seen in clinic in May  \"Admitted 3/6/2024 for nonhealing wound left axilla. He was recently diagnosed with SCCA after biopsy of left axillary mass  2/23/24. CT chest + partially visualized large left axillary mass and necrotic adenopathy consistent with known SCC.  Patchy groundglass density and subsegmental atelectasis, consistent with chronic inflammatory/infectious process. Drain placed 3/7-cultures of fluid polymicrobial + MRSA (only oral option available is Zyvox), E- faecalis, Fingoldia. Repeat  CT 3/11 : Irregular fluid collection in the LEFT axillary subcutaneous soft tissues measuring 8.3 x 5.1 x 11.1 cm with gas bubbles and drainage catheter in place. IR repositioned and up sized drain on 3/14.  Patient will discharge home on 3/16 with drain in place.  He will follow-up with infectious disease outpatient for drain management and ongoing antibiotics.  Ultimately, patient may require surgical intervention for necrotic tissue that is present.  Patient was discharged home on a 14-day course of p.o. Augmentin 875/125 mg twice daily and p.o. Zyvox 600 mg twice daily.      03/22/24-today the patient reports that he has had having nausea while on both Augmentin and Zyvox.  Due to limited antibiotic options recommend spacing antibiotics by 2 hours and continue taking probiotic to see if he has " any relief in symptoms.  He does have as needed Zofran available.  He denies any vomiting, fever, chills, diarrhea.  Left axillary abscess/tumor site with continued drainage.  Patient is keeping a log of drainage output which is slowing compared to hospital admission.  Continues to have 30 to 50 mL of output a day.  We discussed antibiotic treatment options.  Due to his MRSA and history of renal cell transplant he is limited to only oral Zyvox for antibiotic treatment which can only be used for 2 weeks before causing worsening thrombocytopenia.  Patient already has thrombocytopenia 137.  Augmentin is covering the E faecalis and Feingoldia.  Recommended to finish 14-day course of antibiotic therapy which ends on 3/30/24 as there is no good oral options for long-term suppression.  Due to the patient having a drain in place with high output will hopefully prevent worsening infection.  Recommend ultimately to have surgical intervention as necrotic tissue will become a nidus for infection.  Education provided on signs and symptoms of worsening infection and when to report to ER/call 911.  Repeat lab work CBC/CMP in 1 week for close monitoring.  Previous lab work reviewed and discussed with patient from 3/15/2024, WBC 6.6, stable anemia, thrombocytopenia 133, chronic history of elevated creatinine 2.0 and decreased GFR 36 stable.      4/2/24-patient following up after completion of Augmentin and Zyvox. He denies any vomiting, fever, chills, diarrhea. Most recent labs from 3/29/2024, WBC 7.7, anemia worsening 11.9/38.6, thrombocytopenia 48.  Patient was stopped on Zyvox as he completed antibiotic therapy that day.  CMP with creatinine continuing to improve 1.91and GFR slowly improving 38. Pending repeat labs CMP for today. Drain continues to have 20-50 ml of serosanguinous outpatient a day. Pending start date for immunotherapy. Due to concerns of nidus of infection, unable to have I&D due to squamous cell carcinoma, and  pending start date for immunotherapy, will start patient on an indefinite course of IV daptomycin 6 mg/kg ( renal dosed) once daily until surgical plan can be established or repeat CT scan shows resolution of abscess. Discussed case with ROCKY Mccoy. No other oral options available for MRSA treatment. Will continue  with PO Augmentin 875/125 mg 1 tab twice daily as well for coverage of E- faecalis, Fingoldia. Orders placed fr CBC/CMP/INR, PICC placement and home infusions.  After daptomycin started will stop Augmentin as Daptomycin will cover E- faecalis, MRSA, and Finegoldia     4/10/24-patient following up while on p.o. Augmentin 875/125 mg twice daily and IV daptomycin 6 Mg/KG once daily with no end date set due to extent of infection.  Stop Augmentin as daptomycin has been shown to have activity against Finegoldia magna. As previously discussed above there is concerns of nidus of infection but patient was unable to have I&D due to, cell carcinoma.  Left axillary wound continues to have drainage in NICKI bulb.  Decreasing erythema and swelling of left axillary wound. Labs reviewed from 4/8/2024, WBC 6.0, stable anemia 11.3/36.4, neutrophils WNL, CMP chronic but elevated creatinine 1.57 trending down, decreased GFR 48 stable, LFTs WNL.  CPK yet to be drawn.  Patient reports he has been tolerating both antibiotics with no complaints of side effects.  He denies any nausea, vomiting, fever, chills, constipation, diarrhea, muscle pains or aches, cramping.  Continue follow-up with oncology and surgery.  Recommended to continue probiotic.       4/19/24-patient following up while on IV daptomycin 6 Mg/KG daily.  Patient reports he is still tolerating IV daptomycin with no complaints of side effects.  He denies any nausea, vomiting, fever, chills, constipation, diarrhea, muscle pains or aches.  Left axillary wound continues to decrease in size NICKI bulb has less output.  Most recent labs reviewed from 4/17/2024, WBC 6.6,  stable anemia 11.7/37.1, thrombocytopenia chronic and stable 71, CMP chronically elevated creatinine 1.60 stable, decreased GFR 47 stable, LFTs WNL, CPK 75 and stable.  Continue daily infusions of IV daptomycin and follow-up with oncology and surgery.  Discussed case with oncology MD Fajardo, outside PET scan shows uptake in T5, possible metastasis versus infection.  Given that patient has been on IV daptomycin and no previous reports of bacteremia more likely metastasis but will wait for MRI of T and L-spine which was ordered by MD Fajardo.  Left axillary NICKI bulb and extension tubing changed in office.  Continues to have decreased swelling and left axillary.  Necrotic tissue with odorous smell, no signs of worsening infection.  Serosanguineous drainage in NICKI bulb 20 mL daily.  Continue with IV daptomycin 6 Mg/KG daily with weekly labs CBC/CMP/CPK.  Ultimately would require I&D of left axillary to remove nidus of infection     4/24/24-patient comes into the clinic today with complaints that his left axillary NICKI drain is having hardly any fluid output but he is having significant drainage from around tube and from wound site.  He does report increasing fatigue and increasing swelling in the left axillary.  He denies any nausea, vomiting, fever, chills, diarrhea, muscle pains or aches.  Repeat CT scan chest with contrast for a left evaluation of drain site.  Possibly may remove drain site if abscess is resolved.  Wound with increasing tissue slough edge and drainage, serosanguineous drainage.  Stat referral placed to wound clinic.  Will decrease dose of IV daptomycin to 4 Mg/KG once daily.  Previous lab work reviewed from 4/23/2024, WBC 7.0, anemia resolved, thrombocytopenia 61.  Possibly due to daptomycin so outpatient hold dose for the next x 2 days then restart daptomycin at 4 Mg/KG.  Creatinine chronically elevated 1.72 and stable, decreased GFR 43 and stable, CPK 75 stable.  Wound dressing supplies provided to  "patient.     5/3/24-CT scan from 5/2/2024 shows resolution of left axillary fluid collection as there is now a large soft tissue deficit.  There has been an increase in bilateral nodular with infiltrates which may represent either neoplasm or infection.  Left axillary site still contains necrotic tissue which patient reports still continues to slough.  No signs of active infection.  Stop daptomycin today.  PICC line removed in office.  Pressure held until hemostasis achieved.  Most recent labs reviewed from 4/30/2024, WBC 7.4, stable anemia 10.8/34.4, platelets 111 and trending upwards, CMP with chronic but elevated creatinine 1.84 and GFR 40 slowly improving.  Repeat labs CBC/CMP while off antibiotic therapy in 1 week for close monitoring.  Patient denies any nausea, vomiting, fever, chills, diarrhea.  He does report fatigue and continues to have left arm swelling.  Updated wound team and oncology on plan of care and stopping antibiotic therapy and monitoring.      5/8/24-patient following up while off antibiotic therapy for continuous monitoring.  Left axillary site with soft pink tissue.  Serosanguineous drainage.  Continues to have necrotic tissue slough.  No signs of active infection.  Most recent labs while off antibiotic therapy from 5/7/2024 WBC 10.0, stable anemia 11.9/37.7, chronic thrombocytopenia 142 and improving, elevation in neutrophils, CMP creatinine 1.57 chronic and stable GFR 48 chronic and stable LFTs WNL.\"    On admission, no fever or leukocytosis O2 sat 93% on 2liters   SANKET with creat 2.89 CXR with pleural effusions and interstitial infiltrates  Started on Augmentin empirically for pneumonia-per PCT not improved but per patient decreased sputum production  Also started empirically on heparin gtt for PE  He wants to know if cancer has spread to his lungs  Renal and Cards consulted  Infectious Diseases consulted for antibiotic recommendation and management PJP      Review Of Systems:  Decreased " cough  All other systems are reviewed and are negative     PMH:   Past Medical History:   Diagnosis Date    A-fib (HCC)     Arrhythmia     Benign essential hypertension     Diabetes (HCC)     type 2    Heart burn     Hemorrhagic disorder (HCC)     Eliquis    Hyperlipoproteinemia     Hypertension     not on meds anymore    Indigestion     Myocardial infarct (HCC) 2013    stent    Polycystic kidney 09/10/2010    RIGHT KIDNEY TRANSPLANT    Presence of Watchman left atrial appendage closure device 02/29/2024    Sleep apnea 01/30/2024    states he was told he had sleep apnea but has not had a sleep study    Snoring          PSH:  Past Surgical History:   Procedure Laterality Date    STENT PLACEMENT  2013    cardiac    KNEE MANIPULATION  02/16/2012    Performed by LATOYA CONNER at SURGERY [facility]    KNEE UNICOMPARTMENTAL  12/23/2011    Performed by LATOYA CONNER at SURGERY [facility] ORS    KNEE ARTHROSCOPY  12/23/2011    Performed by LATOYA CONNER at SURGERY [facility] ORS    KNEE ARTHROSCOPY  05/03/2011    Performed by HANANE GOLDMAN at SURGERY SAME DAY [facility] ORS    MENISCECTOMY, KNEE, MEDIAL  05/03/2011    Performed by HANANE GOLDMAN at SURGERY SAME DAY [facility] ORS    OTHER  09/10/2010    RIGHT KIDNEY TRANSPLANT    KNEE ARTHROPLASTY TOTAL  01/12/2007    RIGHT    KNEE ARTHROSCOPY  04/10/2006    RIGHT    OTHER ORTHOPEDIC SURGERY  07/08/1974    LEFT KNEE DEBRIDEMENT       FAMILY HX:  History reviewed. No pertinent family history.    SOCIAL HX:  Social History     Socioeconomic History    Marital status: Single     Spouse name: Not on file    Number of children: Not on file    Years of education: Not on file    Highest education level: Not on file   Occupational History    Not on file   Tobacco Use    Smoking status: Never     Passive exposure: Never    Smokeless tobacco: Never   Vaping Use    Vaping status: Never Used   Substance and Sexual Activity    Alcohol use: No    Drug use: No    Sexual activity:  Yes     Partners: Female   Other Topics Concern    Not on file   Social History Narrative    Not on file     Social Determinants of Health     Financial Resource Strain: Not on file   Food Insecurity: No Food Insecurity (6/11/2024)    Hunger Vital Sign     Worried About Running Out of Food in the Last Year: Never true     Ran Out of Food in the Last Year: Never true   Transportation Needs: No Transportation Needs (6/11/2024)    PRAPARE - Transportation     Lack of Transportation (Medical): No     Lack of Transportation (Non-Medical): No   Physical Activity: Not on file   Stress: Not on file   Social Connections: Not on file   Intimate Partner Violence: Not At Risk (6/11/2024)    Humiliation, Afraid, Rape, and Kick questionnaire     Fear of Current or Ex-Partner: No     Emotionally Abused: No     Physically Abused: No     Sexually Abused: No   Housing Stability: Low Risk  (6/11/2024)    Housing Stability Vital Sign     Unable to Pay for Housing in the Last Year: No     Number of Places Lived in the Last Year: 1     Unstable Housing in the Last Year: No     Social History     Tobacco Use   Smoking Status Never    Passive exposure: Never   Smokeless Tobacco Never     Social History     Substance and Sexual Activity   Alcohol Use No       Allergies/Intolerances:  Allergies   Allergen Reactions    Doxycycline Rash     Sweats and shakes: 9/28/17: Clarified allergy with patient. Allergy was in 1998 and he doesn't remember what happened. He thought the medication is for pain.  Tolerates doxycycline 9/2017         Other Current Medications:    Current Facility-Administered Medications:     insulin GLARGINE (Lantus,Semglee) injection, 14 Units, Subcutaneous, Q EVENING **AND** [DISCONTINUED] insulin lispro (HumaLOG,AdmeLOG) injection, 1-6 Units, Subcutaneous, 4X/DAY ACHS **AND** POC blood glucose manual result, , , Q AC AND BEDTIME(S) **AND** NOTIFY MD and PharmD, , , Once **AND** Administer 20 grams of glucose (approximately  8 ounces of fruit juice) every 15 minutes PRN FSBG less than 70 mg/dL, , , PRN **AND** dextrose 10 % BOLUS 25 g, 25 g, Intravenous, Q15 MIN PRN, Prince MONIKA Simental D.O.    insulin lispro (HumaLOG,AdmeLOG) injection, 2-9 Units, Subcutaneous, 4X/DAY ACHS, Prince MONIKA Simental D.O., 3 Units at 06/15/24 0951    [START ON 6/16/2024] metoprolol SR (Toprol XL) tablet 75 mg, 75 mg, Oral, DAILY, Prince MONIKA Simental D.O.    guaiFENesin ER (Mucinex) tablet 600 mg, 600 mg, Oral, Q12HRS, Teresa Estrada M.D., 600 mg at 06/15/24 0852    heparin infusion 25,000 units in 500 mL 0.45% NACL, 0-30 Units/kg/hr, Intravenous, Continuous, Prince MONIKA Simental D.O., Last Rate: 18 mL/hr at 06/15/24 1226, 9 Units/kg/hr at 06/15/24 1226    furosemide (Lasix) tablet 40 mg, 40 mg, Oral, BID DIURETIC, Fish Wilder M.D., 40 mg at 06/15/24 0433    sodium chloride (Ocean) 0.65 % nasal spray 2 Spray, 2 Spray, Nasal, Q2HRS PRN, NGOC DanielORudi, 2 [place] at 06/13/24 0513    acetaminophen (Tylenol) tablet 650 mg, 650 mg, Oral, Q6HRS PRN, Prince MONIKA Simental D.O., 650 mg at 06/15/24 0432    Notify provider if pain remains uncontrolled, , , CONTINUOUS **AND** Use the Numeric Rating Scale (NRS), Herron-Baker Faces (WBF), or FLACC on regular floors and Critical-Care Pain Observation Tool (CPOT) on ICUs/Trauma to assess pain, , , CONTINUOUS **AND** Pulse Ox, , , CONTINUOUS **AND** Pharmacy Consult Request ...Pain Management Review 1 Each, 1 Each, Other, PHARMACY TO DOSE **AND** If patient difficult to arouse and/or has respiratory depression (respiratory rate of 10 or less), stop any opiates that are currently infusing and call a Rapid Response., , , CONTINUOUS, Prince MONIKA Simental D.O.    oxyCODONE immediate-release (Roxicodone) tablet 2.5 mg, 2.5 mg, Oral, Q3HRS PRN **OR** oxyCODONE immediate-release (Roxicodone) tablet 5 mg, 5 mg, Oral, Q3HRS PRN, 5 mg at 06/14/24 2146 **OR** [DISCONTINUED] HYDROmorphone (Dilaudid) injection 0.25 mg, 0.25 mg, Intravenous, Q3HRS PRN, Prince MONIKA Simental D.O.     "senna-docusate (Pericolace Or Senokot S) 8.6-50 MG per tablet 2 Tablet, 2 Tablet, Oral, Q EVENING **AND** polyethylene glycol/lytes (Miralax) Packet 1 Packet, 1 Packet, Oral, QDAY PRN, NGOC LoboO.    atorvastatin (Lipitor) tablet 80 mg, 80 mg, Oral, QHS, NGOC LoboO., 80 mg at 24    benzonatate (Tessalon) capsule 200 mg, 200 mg, Oral, TID PRN, NGOC LoboO., 200 mg at 24 0521    gabapentin (Neurontin) capsule 400 mg, 400 mg, Oral, BID, NGOC LoboO., 400 mg at 06/15/24 0433    omeprazole (PriLOSEC) capsule 20 mg, 20 mg, Oral, DAILY, NGOC LoboO., 20 mg at 06/15/24 0433    predniSONE (Deltasone) tablet 20 mg, 20 mg, Oral, DAILY, MAGY Lobo.O., 20 mg at 06/15/24 0433    sirolimus (Rapamune) tablet 4 mg, 4 mg, Oral, DAILY, NGOC LoboO., 4 mg at 06/15/24 0433    mometasone-formoterol (Dulera) 200-5 MCG/ACT inhaler 2 Puff, 2 Puff, Inhalation, BID, Charlie Casey M.D., 2 Puff at 06/15/24 043    aspirin EC tablet 81 mg, 81 mg, Oral, DAILY, MAGY Lobo.O., 81 mg at 06/15/24 043      Most Recent Vital Signs:  /70   Pulse 93   Temp 36.5 °C (97.7 °F) (Temporal)   Resp 16   Ht 1.981 m (6' 6\")   Wt 98.5 kg (217 lb 2.5 oz)   SpO2 95%   BMI 25.09 kg/m²   Temp  Av.4 °C (97.5 °F)  Min: 35.9 °C (96.6 °F)  Max: 37.6 °C (99.6 °F)    Physical Exam:  General: Ill-appearing, well nourished   HEENT: NCAT, PERRLA, sclera anicteric  Neck: supple  Chest: mildly labored.  Cardiac: IRR   Abdomen: + bowel sounds, soft, non-tender, non-distended  Extremities: No cyanosis, clubbing. +edema, 2+ pulses  Skin: necrotic mass axilla  Neuro: Alert and oriented times 3, Speech fluent CN intact HORN  Psych: Mood normal Affect normal    Pertinent Lab Results:  Recent Labs     248 06/15/24  0811   WBC 5.0 9.2      Recent Labs     248 06/15/24  0811   HEMOGLOBIN 11.7* 12.6*   HEMATOCRIT 40.4* 41.8*   MCV 86.7 84.1   MCH 25.1* 25.4*   PLATELETCT 63* 64* "         Recent Labs     06/13/24 0228 06/14/24  0544 06/14/24  2345   SODIUM 137 140 139   POTASSIUM 5.2 4.7 4.7   CHLORIDE 101 102 100   CO2 23 24 25   CREATININE 2.43* 2.34* 2.45*        Recent Labs     06/13/24 0228 06/14/24  0544 06/14/24  2345   ALBUMIN 3.0* 3.2 3.0*        Pertinent Micro:  Results       Procedure Component Value Units Date/Time    Pneumocystis DFA [[id number]]     Order Status: Sent Specimen: Respirate from Sputum     CULTURE RESPIRATORY W/ GRM STN [[id number]] Collected: 06/12/24 2012    Order Status: Completed Specimen: Respirate from Sputum Updated: 06/14/24 1319     Significant Indicator NEG     Source RESP     Site SPUTUM     Culture Result Light growth usual upper respiratory shaun including yeast.  No clinically significant Staphylococcus aureus, Methicillin  Resistant Staphylococcus aureus, or Pseudomonas species  isolated.       Gram Stain Result Few WBCs.  Rare epithelial cells.  Few Gram positive cocci.  Specimen Quality Score: 1+      Narrative:      Collected By: [id number] YADIEL SAMUELS    GRAM STAIN [[id number]] Collected: 06/12/24 2012    Order Status: Completed Specimen: Respirate Updated: 06/12/24 2210     Significant Indicator .     Source RESP     Site SPUTUM     Gram Stain Result Few WBCs.  Rare epithelial cells.  Few Gram positive cocci.  Specimen Quality Score: 1+      Narrative:      Collected By: [id number] YADIEL SAMUELS    MRSA By PCR (Amp) [[id number]] Collected: 06/12/24 1045    Order Status: Completed Specimen: Respirate from Nares Updated: 06/12/24 1500     MRSA by PCR Negative    CoV-2, Flu A/B, And RSV by PCR (Cepheid) [[id number]] Collected: 06/11/24 2047    Order Status: Completed Specimen: Respirate from Nasopharyngeal Updated: 06/12/24 0916     Influenza virus A RNA Negative     Influenza virus B, PCR Negative     RSV, PCR Negative     SARS-CoV-2 by PCR NotDetected     Comment: [facility] providers: PLEASE REFER TO DE-ESCALATION AND RETESTING PROTOCOL  on  [email or web address]    **The [facility] GeneXpert Xpress SARS-CoV-2 RT-PCR Test has been made  available for use under the Emergency Use Authorization (EUA) only.          SARS-CoV-2 Source NP Swab    Respiratory Panel by PCR (Inpatient ONLY) [[id number]]     Order Status: Canceled Specimen: Nasopharyngeal           Blood Culture   Date Value Ref Range Status   09/27/2017 No growth after 5 days of incubation.  Final     Blood Culture Hold   Date Value Ref Range Status   09/27/2017 Collected  Final        Studies:  IMPRESSION:     1.  Stable cardiomegaly and interstitial edema.     2.  Stable bibasilar atelectasis/consolidation and bilateral pleural effusions  IMPRESSION:   ABnormal CXR-pulm edema with effusions   -possible concomitant pneumonia  -at risk for a multitude of pathogens as well as noninfectious etiologies  Chronic cough dyspnea  Renal transplant  Metastatic SCCA  CHF-EF 45% +orthopnea and leg swelling  SANKET on CKD  H/o MRSA      PLAN:   Optimize diuresis  Recommend diagnostic procedure such as bronch if feasible to help define infectious vs noninfectious etiologies of his dyspnea  Do not recommend additional empiric treatment for PJP at this time  Check sputum PJP, fungitell  Avoid nephrotoxic agents  Dose adjust levofloxacin for 7 days for empiric treatment pneumonia pending diagnostic work up    Plan of care discussed with TIFFANY Casey M.D.. Will continue to follow    Tania Briggs M.D.

## 2024-06-15 NOTE — WOUND TEAM
[facility]  Inpatient Services  Wound and Skin Care Follow-up    Admission Date: 6/11/2024     Last order of IP CONSULT TO WOUND CARE was found on 6/11/2024 from Hospital Encounter on 6/11/2024     HPI, PMH, SH: Reviewed    Past Surgical History:   Procedure Laterality Date    STENT PLACEMENT  2013    cardiac    KNEE MANIPULATION  02/16/2012    Performed by LATOYA CONNER at SURGERY [facility]    KNEE UNICOMPARTMENTAL  12/23/2011    Performed by LATOYA CONNER at SURGERY [facility]    KNEE ARTHROSCOPY  12/23/2011    Performed by LATOYA CONNER at SURGERY [facility]    KNEE ARTHROSCOPY  05/03/2011    Performed by HANANE GOLDMAN at SURGERY SAME DAY [facility]    MENISCECTOMY, KNEE, MEDIAL  05/03/2011    Performed by HANANE GOLDMAN at SURGERY SAME DAY [facility]    OTHER  09/10/2010    RIGHT KIDNEY TRANSPLANT    KNEE ARTHROPLASTY TOTAL  01/12/2007    RIGHT    KNEE ARTHROSCOPY  04/10/2006    RIGHT    OTHER ORTHOPEDIC SURGERY  07/08/1974    LEFT KNEE DEBRIDEMENT     Social History     Tobacco Use    Smoking status: Never     Passive exposure: Never    Smokeless tobacco: Never   Substance Use Topics    Alcohol use: No     Chief Complaint   Patient presents with    Lightheadedness     Pt was admitted three weeks ago and has had dizziness/lightheaded since he was discharged. Pt compliant with medications.     Sent by MD     Pt was seen by nephrologist this morning and was told to come to ER due to fluid retention and SOB.     Shortness of Breath     Pt was started on antibiotics yesterday due to productive cough. Pt has hx of pleural effusions without drainage. Pt found to be 89% on RA. No hx of oxygen at home.      Diagnosis: Acute on chronic renal insufficiency [N28.9, N18.9]    Unit where seen by Wound Team: S153/00     WOUND FOLLOW UP RELATED TO:  follow up axilla and gluteal cleft       WOUND TEAM PLAN OF CARE - Frequency of Follow-up:   Nursing to follow dressing orders written for  wound care. Contact wound team if area fails to progress, deteriorates or with any questions/concerns if something comes up before next scheduled follow up (See below as to whether wound is following and frequency of wound follow up)                   Weekly -      WOUND HISTORY:       Jama is a 68 y.o. 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 He presented with SANKET and SOB requiring 2L supplemental O2 and was admitted for diuresis.     Pt is followed at OP wound clinic for axillar wound.  Pt has been followed by lymphedema therapist in the past but not currently.         WOUND ASSESSMENT/LDA  Wound 06/11/24 Soft Tissue Necrosis Axilla Left (Active)   Date First Assessed/Time First Assessed: 06/11/24 2245   Present on Original Admission: Yes  Hand Hygiene Completed: Yes  Primary Wound Type: Soft Tissue Necrosis  Location: Axilla  Laterality: Left      Assessments 6/15/2024  2:00 PM   Wound Image     Periwound Assessment Satellite lesions;Pink   Drainage Amount Small   Drainage Description Serosanguineous   Dressing Changed Changed   Dressing Options Moist Roll Gauze;Absorbent Abdominal Pad;Hypafix Tape   Dressing Change/Treatment Frequency Every Shift, and As Needed   NEXT Dressing Change/Treatment Date 06/15/24   Wound Length (cm) 5 cm   Wound Width (cm) 8 cm   Wound Depth (cm) 8.5 cm   Wound Surface Area (cm^2) 40 cm^2   Wound Volume (cm^3) 340 cm^3   Wound Healing % -127   Wound Bed Granulation (%) 50 %   Wound Bed Slough (%) 50 %   Wound Odor None   WOUND NURSE ONLY -  Time Spent with Patient (mins) 60       Wound 06/12/24 Full Thickness Wound Buttocks Bilateral (Active)   Date First Assessed/Time First Assessed: 06/12/24 1300   Present on Original Admission: Yes  Hand Hygiene Completed: Yes  Primary Wound Type: Full Thickness Wound  Location: Buttocks  Laterality: Bilateral      Assessments 6/15/2024  2:00 PM   Site Assessment Pink   Periwound Assessment Maceration   Periwound Protectant Barrier Paste   NEXT Weekly Photo (Inpatient Only) 06/22/24   Wound Odor None        Vascular:    MIRELLA:   No results found.    Lab Values:    Lab Results   Component Value Date/Time    WBC 9.2 06/15/2024 08:11 AM    RBC 4.97 06/15/2024 08:11 AM    HEMOGLOBIN 12.6 (L) 06/15/2024 08:11 AM    HEMATOCRIT 41.8 (L) 06/15/2024 08:11 AM    CREACTPROT 13.64 (H) 04/24/2024 03:30 PM    SEDRATEWES 34 (H) 04/24/2024 03:30 PM    HBA1C 11.2 (A) 05/23/2024 03:10 PM         Culture Results show:  No results found for this or any previous visit (from the past 720 hour(s)).    Pain Level/Medicated:  None, Tolerated without pain medication       INTERVENTIONS BY WOUND TEAM:   Performed standard wound care which includes appropriate positioning, dressing removal and non-selective debridement. Pictures and measurements obtained weekly if/when required.    Wound:  L axilla  Preparation for Dressing removal: Removed without difficulty  Cleansed/Non-selectively Debrided with:  pt refused irrigation of this wound  Nadeen wound: Cleansed with Normal Saline and Gauze, Prepped with N/A  Primary Dressing:  saline soaked rolled gauze  Secondary (Outer) Dressing: ABD and hypafix   Pt has a tubi  on his L UE  Wound:  gluteal cleft  Cleaned with damp cloth and 4 in 1 cleanser  Applied barrier paste    Advanced Wound Care Discharge Planning  Number of Clinicians necessary to complete wound care: 1  Is patient requiring IV pain medications for dressing changes:  No   Length of time for dressing change 60 min. (This does not  include chart review, pre-medication time, set up, clean up or time spent charting.)    Interdisciplinary consultation: Patient, Bedside RN (), .      EVALUATION / RATIONALE FOR TREATMENT:     Date:  06/15/24  Wound Status:    Pt's axillary wound is deeper, no purulence, no odor. Pt is very fixed in the type of care he receives especially to the axillary wound - pt refusing irrigation with normal saline, explained to pt the benefits of wound cleansing especially decreasing bacterial burden but pt was adamant that his wound not be irrigated.  Will continue with moist wound dressings for now.  Gluteal cleft has more maceration so changed to barrier cream, will re assess and consider adding viscopaste if site fails to resolve.    Date:  06/12/24  Wound Status:  Initial evaluation    The patient has a chronic full thickness open wound to left axilla, which is extremely sensitive and painful. The patient changes his dressing at home twice a day at least and requests that only normal saline be used to clean or treat wound bed as anything else stings intolerably. Wet to dry dressing moistened with normal saline gently tucked in to wound bed and covered with ABD pad and secured with hypafix tape.     Sacrum with full thickness soft tissue necrosis, denuded blanching tissue with red granular tissue and yellow slough present. Applied cavillon advanced to protect painful denuded tissue.        Goals: Steady decrease in wound area and depth weekly.    NURSING PLAN OF CARE ORDERS:  Dressing changes: See Dressing Care orders    NUTRITION            PREVENTATIVE INTERVENTIONS:   Q shift Javad - performed per nursing policy  Q shift pressure point assessments - performed per nursing policy        Anticipated discharge plans:  Outpatient Wound Center        Vac Discharge Needs:  Vac Discharge plan is purely a recommendation from wound team and not a requirement for discharge unless otherwise stated by physician.  Not Applicable Pt  not on a wound vac

## 2024-06-15 NOTE — PROGRESS NOTES
Received bedside report and accepted care of patient. Reviewed shift plan of care with patient and patient verbalized understanding. Fall risk verified and precautions in place.  Call light and personal belongings within reach.      Patient refused bedtime blood sugar check and coverage.      Wound care completed with prn pain meds for dressing changes.     First Heparin xA critical >1.1. Heparin protocol followed and gtt paused for 1 hour, with a rate drop of 3.    When patient stands, heart rate goes up to 160-170 and drops back to  when he is back in bed.  Patient stands at edge of bed to void.     Patient appeared to be able to rest through the night with no s/s of acute distress.

## 2024-06-15 NOTE — PROGRESS NOTES
[facility] Internal Medicine Daily Progress Note    Date of Service  6/15/2024    [facility] Team: R IM Green Team   Attending: Charlie Casey M.d.  Senior Resident: Dr. Teresa Estrada  Intern:  Dr. Prince Simental   Contact Number: [phone]    Chief Complaint  Jama Altman is a 68 y.o. male admitted 6/11/2024 with leg swelling and dizziness    Hospital Course  Jama Altman is a 68 y.o. 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 CAD s/p stent, renal transplant 2010 on chronic immunosuppressant therapy, (hx of polycystic kidney disease), HLD, HTN and PAD who presented was admitted due to hypovolemia in the setting of acute on chronic kidney injury and acute hypoxic failure.    At ED,  saturating 94% at 2 L of oxygen but afebrile.  CMP was remarkable for elevated BUN of 80 and elevated creatinine of 2.59.  Chest x-ray presented hypoinflation with bibasilar atelectasis/pneumonitis and small pleural effusion without significant changes compared to 5/28/24 chest x-ray.  At ED, patient received IV Lasix of 60 mg.   Nephrology has been following and recommended diuresing.  Has been treating with antibiotics for possible pneumonia.  D-dimer was elevated. With acute kidney injury and history of Kidney transplant, V/Q scan was ordered but patient could not lie down flat more than 15 mins so V/Q scan got canceled. Patient agreed to start heparin to treat possible pulmonary embolism after discussion benefit and risk of heparin including bleeding risk. Cardiologist consulted for atrial fibrillation with RVR on exertion      Interval Problem Update  No acute overnight events  Started Heparin on 6/14/24 to treat possible pulmonary embolism  Titrate up metoprolol from 50 mg to 75 mg SR  Hemoglobin stable  Cardiology consulted for symptomatic A-fib with RVR on  exertion  Reach out to ID for treating possible PCP with elevation LDH but not concerning  Discontinued Metolazone     Subjective  Patient stated that he is breathing better.  Has been having nosebleeding multiple times.  Denies palpitation and chest pain when he is ambulating.     I have discussed this patient's plan of care and discharge plan at IDT rounds today with Case Management, Nursing, Nursing leadership, and other members of the IDT team.    Consultants/Specialty  nephrology- Dr. Wilder     Code Status  Full Code    Disposition  The patient is not medically cleared for discharge to home or a post-acute facility.      I have placed the appropriate orders for post-discharge needs.    Review of Systems  Review of Systems   Constitutional:  Negative for chills and fever.   Respiratory:  Positive for cough and sputum production. Negative for hemoptysis, shortness of breath and wheezing.         Coughing has been improving   Cardiovascular:  Positive for orthopnea. Negative for chest pain and leg swelling.        Leg swelling improving   Gastrointestinal:  Negative for nausea and vomiting.   Neurological:  Positive for dizziness.        Dizziness when he walks        Physical Exam  Temp:  [36.2 °C (97.2 °F)-36.6 °C (97.9 °F)] 36.5 °C (97.7 °F)  Pulse:  [] 93  Resp:  [16-19] 16  BP: (100-137)/(66-84) 104/70  SpO2:  [85 %-95 %] 95 %    Physical Exam  Vitals reviewed.   Constitutional:       General: He is not in acute distress.     Appearance: He is not ill-appearing, toxic-appearing or diaphoretic.   HENT:      Head: Normocephalic and atraumatic.      Mouth/Throat:      Mouth: Mucous membranes are moist.      Pharynx: Oropharynx is clear. No oropharyngeal exudate.      Comments: Bruise on left lower lip  Eyes:      Extraocular Movements: Extraocular movements intact.      Pupils: Pupils are equal, round, and reactive to light.   Cardiovascular:      Rate and Rhythm: Normal rate. Rhythm irregular.      Heart  sounds: No murmur heard.  Pulmonary:      Effort: Pulmonary effort is normal. No respiratory distress.   Abdominal:      General: Abdomen is flat. There is no distension.      Palpations: Abdomen is soft.      Tenderness: There is no abdominal tenderness. There is no guarding or rebound.   Musculoskeletal:         General: Normal range of motion.      Cervical back: Normal range of motion. No rigidity.      Right lower leg: No edema.      Left lower leg: No edema.      Comments: Swelling on left upper extremity, patient stated its chronic   Skin:     General: Skin is warm and dry.      Coloration: Skin is not jaundiced.      Findings: Lesion present.      Comments: lesion dressing applied on left axillary region  Metastatic cancer with necrosis   Neurological:      General: No focal deficit present.      Mental Status: He is alert and oriented to person, place, and time.      Cranial Nerves: No cranial nerve deficit.   Psychiatric:         Mood and Affect: Mood normal.         Behavior: Behavior normal.         Fluids    Intake/Output Summary (Last 24 hours) at 6/15/2024 1339  Last data filed at 6/15/2024 1200  Gross per 24 hour   Intake 1573.65 ml   Output 1800 ml   Net -226.35 ml       Laboratory  Recent Labs     06/13/24  0228 06/15/24  0811   WBC 5.0 9.2   RBC 4.66* 4.97   HEMOGLOBIN 11.7* 12.6*   HEMATOCRIT 40.4* 41.8*   MCV 86.7 84.1   MCH 25.1* 25.4*   MCHC 29.0* 30.1*   RDW 52.2* 50.2*   PLATELETCT 63* 64*     Recent Labs     06/13/24  0228 06/14/24  0544 06/14/24  2345   SODIUM 137 140 139   POTASSIUM 5.2 4.7 4.7   CHLORIDE 101 102 100   CO2 23 24 25   GLUCOSE 187* 202* 293*   BUN 75* 73* 71*   CREATININE 2.43* 2.34* 2.45*   CALCIUM 9.4 9.2 9.1     Recent Labs     06/14/24  1647   APTT 31.7   INR 0.95               Imaging  DX-CHEST-2 VIEWS   Final Result      1.  Stable cardiomegaly and interstitial edema.      2.  Stable bibasilar atelectasis/consolidation and bilateral pleural effusions.       DX-CHEST-PORTABLE (1 VIEW)   Final Result         1.  Pulmonary edema and/or infiltrates, increased since prior study.   2.  Moderate bilateral layering pleural effusions   3.  Cardiomegaly   4.  Atherosclerosis      DX-CHEST-2 VIEWS   Final Result      Small effusions with bibasilar atelectasis/infiltrate. There is also mildly increased right upper lobe infiltrate.      DX-CHEST-PORTABLE (1 VIEW)   Final Result      Hypoinflation with bibasilar atelectasis/pneumonitis and trace/small pleural effusions. No significant change.            Assessment/Plan  Problem Representation:    * Acute respiratory failure with hypoxia (HCC)- (present on admission)  Assessment & Plan  Patient has been endorsing shortness of breath and orthopnea.  Hypervolemic on assessment.  Requiring 2 L oxygen at ED.  Chest x-ray presented hypoinflation with bibasilar atelectasis/pneumonitis and small pleural effusion without significant changes compared to 5/28/24 chest x-ray. 2 view CXR with RUL infiltrate, procal elevated 0.4 on admission   Likely due to fluid overload from acute kidney injury, possible pneumonia (high risk due to immunosuppression), less likely acute on chronic heart failure. MRSA nares positive March    (6/13/24) increased oxygen requirement overnight.  Two-view chest x-ray was unremarkable compared to yesterday.  Repeat procalcitonin trending down to 0.34.   (6/14/24) Pro-Randy trending down 0.25.  D-dimer was elevated.  Discussed risk and benefit to start heparin to treat possible pulmonary embolism, patient agreed.  Started heparin  (6/15/24) still require 1.5 L of oxygen same as yesterday.  Discontinued Metolazone   -Transition to oral Lasix 40 mg twice daily starting from 6/13/2024  -Heparin started on 6/14/2024 to treat possible pulmonary embolism  -Augmentin and azithromycin for possible CAP  -Sputum culture presented a few gram-positive growth  -If no improvement in oxygenation despite achieving euvolemia may broaden  antibiotics         Orthostatic dizziness  Assessment & Plan  Endorses worsening dizziness and lightheadedness when he is standing up from sitting up. Negative orthostatics by blood pressure but pulse does increase dramatically with ambulation  Has been tachycardic 160s by standing and walking.  (6/14) walking challenge presented patient only able to walk to the door and has significant dizziness with elevated heart rate 150s-160s.  (6/15) cardiologist consulted for A-fib RVR on exertion.  Recommended titrating metoprolol  -Fall risk precaution  -Increase metoprolol SR to 75 mg from 50 mg    Hypervolemia  Assessment & Plan  Patient endorses 5 pounds weight gain past 3 weeks.  Has been following [facility].  Was sent to ED by recommended from nephrologist due to unresponsive p.o. Lasix.  -Nephrology consulted, see notes for further details but in short  - IV Lasix 40 mg BID, transition to oral Lasix 6/13  -Fluid restriction to 1L, low salt diet  -Daily standing weights  -Daily renal panel with mg    Acute on chronic renal insufficiency- (present on admission)  Assessment & Plan  Baseline creatinine 1.7 - 2.0  Nephrology consulted  -Same plan as hypervolemia    Lymphedema on LUE  Assessment & Plan  Chronic lymphedema on left upper extremity  US LUE 5/4/24 presented No gross evidence of left upper extremity DVT or SVT.   - CTM    HFmrEF  Assessment & Plan  Echocardiogram 5/30/24 presented LVEF of 45% with moderate concentric left ventricular hypertrophy.  Possibly, acute respiratory failure secondary to cardiorenal syndrome or acute on chronic heart failure but less likely   -Same plan as hypervolemia    Stage 4 squamous cell cancer of left axillary region complicated by left axillary abcscess s/p drainage- (present on admission)  Assessment & Plan  Has been followed by outpatient oncologist.   Per patient, he is currently on immunotherapy and last therapy was on last Friday (6/7/24)  -Continue home  prednisone and Sirolimus  - Wound consult placed  -Continue home gabapentin    A-fib (HCC) s/p Watchman- (present on admission)  Assessment & Plan  -Same plan as orthostatic dizziness  - Telemetry monitoring     Type 2 diabetes mellitus (HCC)- (present on admission)  Assessment & Plan  Hem A1c 11.2 (5/23/24)  Home glargine 20 units  -Glargine 14 units  -SSI  -Hypoglycemia protocol      GERD (gastroesophageal reflux disease)- (present on admission)  Assessment & Plan  - Continue home omeprazole    Coronary artery disease s/p stent- (present on admission)  Assessment & Plan  -Continue home aspirin and atorvastatin    Hyperlipidemia- (present on admission)  Assessment & Plan  -Continue home atorvastatin         VTE prophylaxis: heparin ppx    I have performed a physical exam and reviewed and updated ROS and Plan today (6/15/2024). In review of yesterday's note (6/14/2024), there are no changes except as documented above.

## 2024-06-15 NOTE — PROGRESS NOTES
Telemetry Report:        Per Telest[facility] from Monitor Room     Afib , up to 160-170 when ambulating or standing (not sustained)  Coup, PVC, PAC, Trig  -/.14/-

## 2024-06-15 NOTE — PROGRESS NOTES
"[facility] -  PROGRESS NOTE               Author: Fish Wilder M.D. Date & Time: 6/15/2024  12:47 PM     HPI:  Patient is a 68 year old male with a PMHx of afib, CAD, renal transplant in 2010, PKD, HLD, HTN, and squamous cell carcinoma of the left axillary region on immunotherapy via Dr. Fajardo, DM with last A1c of 6.9%, who presented to hospital for SOB from outpatient nephrology clinic.  Pt also has orthopnea and significant lower extremity edema.  He is currently on sirolimus and pred for his transplant. Scr from 6/5 was 2.89 and his baseline is around 1.5-1.9. Nephrology was asked to consult for SANKET. Scr in the ER was 2.56. He states his SOB has been worsening over the past 3 weeks.      No F/C/N/V/CP/He does have SOB.  No melena, hematochezia, hematemesis.  No HA, visual changes, or abdominal pain. + edema bilateral LE    DAILY NEPHROLOGY SUMMARY:  6/12 - No new overnight renal events. Scr is 2.47 today. Diuresing well.   6/13 - Scr is stable. Good UOP. Feels better.   6/14 - Scr continues to head in the right direction. 2.34. HR 88-96 HR and then spikes to 160+ when standing or ambulating.    6/15 - No new overnight renal events. Afib , up to 160-170 when ambulating or standing. Scr is holding steady. Still SOB. Bilateral LE has improved significantly.    REVIEW OF SYSTEMS:    10 point ROS reviewed and is as per HPI/daily summary or otherwise negative    PMH/PSH/SH/FH:   Reviewed and unchanged since admission note    CURRENT MEDICATIONS:   Reviewed from admission to present day    VS:  /70   Pulse 93   Temp 36.5 °C (97.7 °F) (Temporal)   Resp 16   Ht 1.981 m (6' 6\")   Wt 98.5 kg (217 lb 2.5 oz)   SpO2 95%   BMI 25.09 kg/m²     Physical Exam  Vitals and nursing note reviewed.     Constitutional:       General: He is not in acute distress.     Appearance: He is obese. He is not ill-appearing.   HENT:      Head: Normocephalic and atraumatic.      Nose: Nose normal. "      Mouth/Throat:      Mouth: Mucous membranes are moist.      Pharynx: Oropharynx is clear.   Eyes:      Extraocular Movements: Extraocular movements intact.      Conjunctiva/sclera: Conjunctivae normal.      Pupils: Pupils are equal, round, and reactive to light.   Cardiovascular:      Rate and Rhythm: Normal rate and regular rhythm.   Pulmonary:      Effort: No respiratory distress.      Breath sounds: minimal tanya crackles present.   Abdominal:      General: Abdomen is flat. Bowel sounds are normal.      Palpations: Abdomen is soft.   Musculoskeletal:      Cervical back: Normal range of motion and neck supple.      Right lower leg: Edema present.      Left lower leg: Edema present.   Neurological:      General: No focal deficit present.      Mental Status: He is alert and oriented to person, place, and time. Mental status is at baseline.   Psychiatric:         Mood and Affect: Mood normal.         Behavior: Behavior normal.         Thought Content: Thought content normal.         Judgment: Judgment normal.     Fluids:  In: 1473.7 [P.O.:1180; I.V.:293.7]  Out: 1800     LABS:  Recent Labs     06/13/24  0228 06/14/24  0544 06/14/24  2345   SODIUM 137 140 139   POTASSIUM 5.2 4.7 4.7   CHLORIDE 101 102 100   CO2 23 24 25   GLUCOSE 187* 202* 293*   BUN 75* 73* 71*   CREATININE 2.43* 2.34* 2.45*   CALCIUM 9.4 9.2 9.1       IMAGING:   All imaging reviewed from admission to present day    IMPRESSION:  # SANKET    - Improved since last Scr of 2.89 from 6/5/24. Baseline Scr is ~1.9  # Renal transplant    - Continue with home IS medications  # h/o ADPKD  # Mild hyperkalemia  # Acute pulmonary edema  # Desaturation to 85% as outpatient  # Acidosis  # Chronic Immunosuppression on medications (sirolimus and prednisone)  # Type 2 DM - last A1c was 6.9%  # Proteinuria  # Atrial fibrillation  # Vit d deficiency  # Squamous Cell Ca - getting immunotherapy/followed by Dr. Fajardo. He has mets to lymph nodes.   # Anemia      PLAN:  -  There is no acute need for RRT.   - Creatinine is holding steady. He does not want dialysis if it comes to it.   - Continue PO furosemide and d/c metolazone 2.5mg   - Low salt diet  - Continue with home IS medications  - Dose all meds per eGFR   - Rest of management per primary team

## 2024-06-16 LAB
ALBUMIN SERPL BCP-MCNC: 3.1 G/DL (ref 3.2–4.9)
ALBUMIN/GLOB SERPL: 1.2 G/DL
ALP SERPL-CCNC: 66 U/L (ref 30–99)
ALT SERPL-CCNC: 21 U/L (ref 2–50)
ANION GAP SERPL CALC-SCNC: 18 MMOL/L (ref 7–16)
AST SERPL-CCNC: 26 U/L (ref 12–45)
BASOPHILS # BLD AUTO: 0.2 % (ref 0–1.8)
BASOPHILS # BLD: 0.02 K/UL (ref 0–0.12)
BILIRUB SERPL-MCNC: 0.4 MG/DL (ref 0.1–1.5)
BUN SERPL-MCNC: 67 MG/DL (ref 8–22)
CALCIUM ALBUM COR SERPL-MCNC: 9.8 MG/DL (ref 8.5–10.5)
CALCIUM SERPL-MCNC: 9.1 MG/DL (ref 8.5–10.5)
CHLORIDE SERPL-SCNC: 99 MMOL/L (ref 96–112)
CO2 SERPL-SCNC: 20 MMOL/L (ref 20–33)
CREAT SERPL-MCNC: 2.13 MG/DL (ref 0.5–1.4)
EOSINOPHIL # BLD AUTO: 0.03 K/UL (ref 0–0.51)
EOSINOPHIL NFR BLD: 0.3 % (ref 0–6.9)
ERYTHROCYTE [DISTWIDTH] IN BLOOD BY AUTOMATED COUNT: 50.7 FL (ref 35.9–50)
GFR SERPLBLD CREATININE-BSD FMLA CKD-EPI: 33 ML/MIN/1.73 M 2
GLOBULIN SER CALC-MCNC: 2.6 G/DL (ref 1.9–3.5)
GLUCOSE BLD STRIP.AUTO-MCNC: 246 MG/DL (ref 65–99)
GLUCOSE BLD STRIP.AUTO-MCNC: 287 MG/DL (ref 65–99)
GLUCOSE SERPL-MCNC: 211 MG/DL (ref 65–99)
HCT VFR BLD AUTO: 41.5 % (ref 42–52)
HGB BLD-MCNC: 12.6 G/DL (ref 14–18)
IMM GRANULOCYTES # BLD AUTO: 0.16 K/UL (ref 0–0.11)
IMM GRANULOCYTES NFR BLD AUTO: 1.6 % (ref 0–0.9)
LYMPHOCYTES # BLD AUTO: 0.68 K/UL (ref 1–4.8)
LYMPHOCYTES NFR BLD: 6.7 % (ref 22–41)
MAGNESIUM SERPL-MCNC: 2.1 MG/DL (ref 1.5–2.5)
MCH RBC QN AUTO: 25.6 PG (ref 27–33)
MCHC RBC AUTO-ENTMCNC: 30.4 G/DL (ref 32.3–36.5)
MCV RBC AUTO: 84.2 FL (ref 81.4–97.8)
MONOCYTES # BLD AUTO: 0.66 K/UL (ref 0–0.85)
MONOCYTES NFR BLD AUTO: 6.5 % (ref 0–13.4)
NEUTROPHILS # BLD AUTO: 8.54 K/UL (ref 1.82–7.42)
NEUTROPHILS NFR BLD: 84.7 % (ref 44–72)
NRBC # BLD AUTO: 0.03 K/UL
NRBC BLD-RTO: 0.3 /100 WBC (ref 0–0.2)
PHOSPHATE SERPL-MCNC: 3.6 MG/DL (ref 2.5–4.5)
PLATELET # BLD AUTO: 65 K/UL (ref 164–446)
PLATELETS.RETICULATED NFR BLD AUTO: 14.9 % (ref 0.6–13.1)
POTASSIUM SERPL-SCNC: 5 MMOL/L (ref 3.6–5.5)
PROCALCITONIN SERPL-MCNC: 0.2 NG/ML
PROT SERPL-MCNC: 5.7 G/DL (ref 6–8.2)
RBC # BLD AUTO: 4.93 M/UL (ref 4.7–6.1)
SODIUM SERPL-SCNC: 137 MMOL/L (ref 135–145)
UFH PPP CHRO-ACNC: 0.51 IU/ML
UFH PPP CHRO-ACNC: 0.84 IU/ML
WBC # BLD AUTO: 10.1 K/UL (ref 4.8–10.8)

## 2024-06-16 PROCEDURE — A9270 NON-COVERED ITEM OR SERVICE: HCPCS | Performed by: INTERNAL MEDICINE

## 2024-06-16 PROCEDURE — 85520 HEPARIN ASSAY: CPT | Mod: 91

## 2024-06-16 PROCEDURE — 770020 HCHG ROOM/CARE - TELE (206)

## 2024-06-16 PROCEDURE — A9270 NON-COVERED ITEM OR SERVICE: HCPCS

## 2024-06-16 PROCEDURE — 84100 ASSAY OF PHOSPHORUS: CPT

## 2024-06-16 PROCEDURE — 99233 SBSQ HOSP IP/OBS HIGH 50: CPT | Mod: GC | Performed by: INTERNAL MEDICINE

## 2024-06-16 PROCEDURE — 84145 PROCALCITONIN (PCT): CPT

## 2024-06-16 PROCEDURE — 700102 HCHG RX REV CODE 250 W/ 637 OVERRIDE(OP): Performed by: INTERNAL MEDICINE

## 2024-06-16 PROCEDURE — 99222 1ST HOSP IP/OBS MODERATE 55: CPT | Performed by: INTERNAL MEDICINE

## 2024-06-16 PROCEDURE — 99232 SBSQ HOSP IP/OBS MODERATE 35: CPT | Performed by: INTERNAL MEDICINE

## 2024-06-16 PROCEDURE — 700102 HCHG RX REV CODE 250 W/ 637 OVERRIDE(OP)

## 2024-06-16 PROCEDURE — 700111 HCHG RX REV CODE 636 W/ 250 OVERRIDE (IP)

## 2024-06-16 PROCEDURE — 82962 GLUCOSE BLOOD TEST: CPT | Mod: 91

## 2024-06-16 PROCEDURE — 700102 HCHG RX REV CODE 250 W/ 637 OVERRIDE(OP): Performed by: NURSE PRACTITIONER

## 2024-06-16 PROCEDURE — A9270 NON-COVERED ITEM OR SERVICE: HCPCS | Performed by: NURSE PRACTITIONER

## 2024-06-16 PROCEDURE — 85055 RETICULATED PLATELET ASSAY: CPT

## 2024-06-16 PROCEDURE — 80053 COMPREHEN METABOLIC PANEL: CPT

## 2024-06-16 PROCEDURE — 85025 COMPLETE CBC W/AUTO DIFF WBC: CPT

## 2024-06-16 PROCEDURE — 83735 ASSAY OF MAGNESIUM: CPT

## 2024-06-16 PROCEDURE — 36415 COLL VENOUS BLD VENIPUNCTURE: CPT

## 2024-06-16 PROCEDURE — 99233 SBSQ HOSP IP/OBS HIGH 50: CPT | Performed by: INTERNAL MEDICINE

## 2024-06-16 RX ORDER — HEPARIN SODIUM 5000 [USP'U]/ML
5000 INJECTION, SOLUTION INTRAVENOUS; SUBCUTANEOUS EVERY 8 HOURS
Status: DISCONTINUED | OUTPATIENT
Start: 2024-06-16 | End: 2024-06-21 | Stop reason: HOSPADM

## 2024-06-16 RX ORDER — METOPROLOL SUCCINATE 25 MG/1
25 TABLET, EXTENDED RELEASE ORAL 2 TIMES DAILY
Status: DISCONTINUED | OUTPATIENT
Start: 2024-06-16 | End: 2024-06-17

## 2024-06-16 RX ADMIN — INSULIN LISPRO 3 UNITS: 100 INJECTION, SOLUTION INTRAVENOUS; SUBCUTANEOUS at 19:42

## 2024-06-16 RX ADMIN — GUAIFENESIN 600 MG: 600 TABLET, EXTENDED RELEASE ORAL at 17:27

## 2024-06-16 RX ADMIN — GABAPENTIN 400 MG: 400 CAPSULE ORAL at 17:27

## 2024-06-16 RX ADMIN — ACETAMINOPHEN 650 MG: 325 TABLET, FILM COATED ORAL at 11:49

## 2024-06-16 RX ADMIN — OXYCODONE HYDROCHLORIDE 2.5 MG: 5 TABLET ORAL at 02:58

## 2024-06-16 RX ADMIN — GABAPENTIN 400 MG: 400 CAPSULE ORAL at 05:03

## 2024-06-16 RX ADMIN — PREDNISONE 20 MG: 20 TABLET ORAL at 05:03

## 2024-06-16 RX ADMIN — SIROLIMUS 4 MG: 0.5 TABLET ORAL at 06:00

## 2024-06-16 RX ADMIN — ACETAMINOPHEN 650 MG: 325 TABLET, FILM COATED ORAL at 07:14

## 2024-06-16 RX ADMIN — ACETAMINOPHEN 650 MG: 325 TABLET, FILM COATED ORAL at 20:39

## 2024-06-16 RX ADMIN — INSULIN LISPRO 5 UNITS: 100 INJECTION, SOLUTION INTRAVENOUS; SUBCUTANEOUS at 09:45

## 2024-06-16 RX ADMIN — OMEPRAZOLE 20 MG: 20 CAPSULE, DELAYED RELEASE ORAL at 05:02

## 2024-06-16 RX ADMIN — FUROSEMIDE 40 MG: 40 TABLET ORAL at 05:03

## 2024-06-16 RX ADMIN — ATORVASTATIN CALCIUM 80 MG: 80 TABLET, FILM COATED ORAL at 20:40

## 2024-06-16 RX ADMIN — INSULIN LISPRO 3 UNITS: 100 INJECTION, SOLUTION INTRAVENOUS; SUBCUTANEOUS at 13:22

## 2024-06-16 RX ADMIN — LEVOFLOXACIN 250 MG: 500 TABLET, FILM COATED ORAL at 15:05

## 2024-06-16 RX ADMIN — ASPIRIN 81 MG: 81 TABLET, COATED ORAL at 05:03

## 2024-06-16 RX ADMIN — GUAIFENESIN 600 MG: 600 TABLET, EXTENDED RELEASE ORAL at 05:03

## 2024-06-16 RX ADMIN — FUROSEMIDE 40 MG: 40 TABLET ORAL at 15:05

## 2024-06-16 RX ADMIN — MOMETASONE FUROATE AND FORMOTEROL FUMARATE DIHYDRATE 2 PUFF: 200; 5 AEROSOL RESPIRATORY (INHALATION) at 05:05

## 2024-06-16 RX ADMIN — HEPARIN SODIUM 5000 UNITS: 5000 INJECTION, SOLUTION INTRAVENOUS; SUBCUTANEOUS at 21:50

## 2024-06-16 RX ADMIN — MOMETASONE FUROATE AND FORMOTEROL FUMARATE DIHYDRATE 2 PUFF: 200; 5 AEROSOL RESPIRATORY (INHALATION) at 17:27

## 2024-06-16 RX ADMIN — OXYCODONE HYDROCHLORIDE 5 MG: 5 TABLET ORAL at 23:18

## 2024-06-16 ASSESSMENT — ENCOUNTER SYMPTOMS
APNEA: 0
SHORTNESS OF BREATH: 1
SPUTUM PRODUCTION: 1
NAUSEA: 0
FATIGUE: 1
HEADACHES: 0
STRIDOR: 0
SHORTNESS OF BREATH: 0
PALPITATIONS: 0
CHILLS: 0
DIZZINESS: 1
FEVER: 0
ORTHOPNEA: 1
HEMOPTYSIS: 0
VOMITING: 0
WHEEZING: 0
COUGH: 1
DIZZINESS: 0
CHEST TIGHTNESS: 0

## 2024-06-16 ASSESSMENT — FIBROSIS 4 INDEX: FIB4 SCORE: 4.82

## 2024-06-16 ASSESSMENT — PAIN DESCRIPTION - PAIN TYPE
TYPE: ACUTE PAIN

## 2024-06-16 NOTE — PROGRESS NOTES
Infectious Disease Progress Note    Author: Tania Briggs M.D. Date & Time of service: 2024  1:46 PM    Chief Complaint:  Cough and shortness of breath    Interval History:  68 y.o. male admitted 2024 for worsening dyspnea and cough   AF WBC feeling much better today-no shortness of breath. Wound care pictures reviewed  Labs Reviewed, Medications Reviewed, and Wound Reviewed.    Review of Systems:  Review of Systems   Constitutional:  Negative for chills and fever.   Respiratory:  Positive for cough. Negative for hemoptysis, shortness of breath and wheezing.    Skin:  Negative for rash.   All other systems reviewed and are negative.      Hemodynamics:  Temp (24hrs), Av.4 °C (97.5 °F), Min:36.2 °C (97.2 °F), Max:36.6 °C (97.8 °F)  Temperature: 36.6 °C (97.8 °F), Monitored Temp: 36.3 °C (97.3 °F)  Pulse  Av.7  Min: 58  Max: 104   Blood Pressure : (!) 85/53       Physical Exam:  Physical Exam  Vitals and nursing note reviewed.   Constitutional:       General: He is not in acute distress.     Appearance: He is not ill-appearing, toxic-appearing or diaphoretic.   Eyes:      General: No scleral icterus.     Extraocular Movements: Extraocular movements intact.      Pupils: Pupils are equal, round, and reactive to light.   Cardiovascular:      Rate and Rhythm: Normal rate. Rhythm irregular.   Pulmonary:      Effort: Pulmonary effort is normal. No respiratory distress.   Abdominal:      General: There is no distension.   Musculoskeletal:      Comments: Necrotic axillary mass left -improved from previous  5 cm X 8 cm X 8.5 cm  LUE edema   Neurological:      General: No focal deficit present.      Mental Status: He is alert and oriented to person, place, and time.   Psychiatric:         Mood and Affect: Mood normal.         Behavior: Behavior normal.         Meds:    Current Facility-Administered Medications:     insulin GLARGINE **AND** [DISCONTINUED] insulin lispro **AND** POC blood glucose  manual result **AND** NOTIFY MD and PharmD **AND** Administer 20 grams of glucose (approximately 8 ounces of fruit juice) every 15 minutes PRN FSBG less than 70 mg/dL **AND** dextrose bolus    metoprolol SR    heparin    insulin lispro    [COMPLETED] levoFLOXacin **FOLLOWED BY** levoFLOXacin    guaiFENesin ER    furosemide    sodium chloride    acetaminophen    Notify provider if pain remains uncontrolled **AND** Use the Numeric Rating Scale (NRS), Herron-Baker Faces (WBF), or FLACC on regular floors and Critical-Care Pain Observation Tool (CPOT) on ICUs/Trauma to assess pain **AND** Pulse Ox **AND** Pharmacy Consult Request **AND** If patient difficult to arouse and/or has respiratory depression (respiratory rate of 10 or less), stop any opiates that are currently infusing and call a Rapid Response.    oxyCODONE immediate-release **OR** oxyCODONE immediate-release **OR** [DISCONTINUED] HYDROmorphone    senna-docusate **AND** polyethylene glycol/lytes    atorvastatin    benzonatate    gabapentin    omeprazole    predniSONE    sirolimus    mometasone-formoterol    aspirin    Labs:  Recent Labs     06/15/24  0811 06/16/24  0708   WBC 9.2 10.1   RBC 4.97 4.93   HEMOGLOBIN 12.6* 12.6*   HEMATOCRIT 41.8* 41.5*   MCV 84.1 84.2   MCH 25.4* 25.6*   RDW 50.2* 50.7*   PLATELETCT 64* 65*   NEUTSPOLYS 88.30* 84.70*   LYMPHOCYTES 2.20* 6.70*   MONOCYTES 7.40 6.50   EOSINOPHILS 0.20 0.30   BASOPHILS 0.30 0.20     Recent Labs     06/14/24  0544 06/14/24  2345 06/16/24  0708   SODIUM 140 139 137   POTASSIUM 4.7 4.7 5.0   CHLORIDE 102 100 99   CO2 24 25 20   GLUCOSE 202* 293* 211*   BUN 73* 71* 67*     Recent Labs     06/14/24  0544 06/14/24  2345 06/16/24  0708   ALBUMIN 3.2 3.0* 3.1*   TBILIRUBIN 0.4 0.4 0.4   ALKPHOSPHAT 67 76 66   TOTPROTEIN 5.7* 5.7* 5.7*   ALTSGPT 20 17 21   ASTSGOT 15 19 26   CREATININE 2.34* 2.45* 2.13*       Imaging:  DX-CHEST-2 VIEWS    Result Date: 6/13/2024 6/13/2024 11:08 AM HISTORY/REASON FOR EXAM:  Cough and hypoxia. TECHNIQUE/EXAM DESCRIPTION AND NUMBER OF VIEWS: Two views of the chest. COMPARISON: 6/13/2024 FINDINGS: There is stable bibasilar atelectasis and interstitial edema. Heart is enlarged. There are stable small bilateral pleural effusions. Soft tissues and bony structures are unremarkable.     1.  Stable cardiomegaly and interstitial edema. 2.  Stable bibasilar atelectasis/consolidation and bilateral pleural effusions.    DX-CHEST-PORTABLE (1 VIEW)    Result Date: 6/13/2024 6/13/2024 4:21 AM HISTORY/REASON FOR EXAM: Shortness of Breath TECHNIQUE/EXAM DESCRIPTION:  Single AP view of the chest. COMPARISON: Yesterday FINDINGS: Overlying cardiac leads are present. Cardiomegaly is observed. The mediastinal contour appears within normal limits.  The central  pulmonary vasculature appears prominent and indistinct. Bilateral lung volumes are diminished.  Diffuse scattered hazy pulmonary parenchymal opacities are seen. Moderate bilateral pleural effusions are seen. The bony structures appear age-appropriate.     1.  Pulmonary edema and/or infiltrates, increased since prior study. 2.  Moderate bilateral layering pleural effusions 3.  Cardiomegaly 4.  Atherosclerosis    DX-CHEST-2 VIEWS    Result Date: 6/12/2024 6/12/2024 11:17 AM HISTORY/REASON FOR EXAM: Cough; Evaluate for evidence of pneumonia. TECHNIQUE/EXAM DESCRIPTION AND NUMBER OF VIEWS: Two views of the chest. COMPARISON:  6/11/2024 FINDINGS: The cardiomediastinal silhouette is likely stable. There are bilateral pleural effusions with bibasilar atelectasis/infiltrate. This is similar to the prior exam. There is additional infiltrate in the right upper lobe which is slightly increased. No pneumothorax is evident.    Small effusions with bibasilar atelectasis/infiltrate. There is also mildly increased right upper lobe infiltrate.    DX-CHEST-PORTABLE (1 VIEW)    Result Date: 6/11/2024 6/11/2024 12:32 PM HISTORY/REASON FOR EXAM:  Shortness of Breath.  TECHNIQUE/EXAM DESCRIPTION AND NUMBER OF VIEWS: Single portable view of the chest. COMPARISON: 2024 FINDINGS: Cardiomediastinal silhouette is stable. Aortic calcified atherosclerotic plaque. Low lung volumes, small pleural effusions and bibasilar atelectasis and/or pneumonitis again noted not significantly changed from prior. Some gaseous distended bowel in the upper abdomen.     Hypoinflation with bibasilar atelectasis/pneumonitis and trace/small pleural effusions. No significant change.    EC-ECHOCARDIOGRAM COMPLETE W/ CONT    Result Date: 2024  Transthoracic Echo Report Echocardiography Laboratory CONCLUSIONS Compared to the prior study on 2022, there has no changes. Moderate concentric left ventricular hypertrophy. The left ventricular ejection fraction is visually estimated to be 45%. No evidence of valvular abnormality based on Doppler evaluation. CIARA FORRESTER Exam Date:         2024                    10:32 Exam Location:     Inpatient Priority:          Routine Ordering Physician:        MELIDA SAWANT Referring Physician:       MELIDA SAWANT Sonographer:               Micheline Salmeron                            [facility], [facility] Age:    68     Gender:    M MRN:    [id number] :    1955 BSA:    2.38   Ht (in):    77     Wt (lb):    231 Exam Type:     Complete, Contrast Indications:     Shortness of breath ICD Codes:       786.05 CPT Codes:       36833,  BP:   111    /   71     HR:   112 Technical Quality:       Technically difficult study -                          adequate information is obtained MEASUREMENTS  (Male / Female) Normal Values 2D ECHO LV Diastolic Diameter PLAX        5.1 cm                4.2 - 5.9 / 3.9 - 5.3 cm LV Systolic Diameter PLAX         4.8 cm                2.1 - 4.0 cm IVS Diastolic Thickness           1.6 cm                LVPW Diastolic Thickness          1.5 cm                LVOT Diameter                     2.6 cm                Estimated LV  Ejection Fraction    45 %                  LV Ejection Fraction MOD BP       54.5 %                >= 55  % LV Ejection Fraction MOD 4C       53.9 %                LV Ejection Fraction MOD 2C       53.7 %                DOPPLER AV Peak Velocity                  2 m/s                 AV Peak Gradient                  16.3 mmHg             AV Mean Gradient                  4.4 mmHg              LVOT Peak Velocity                1 m/s                 AV Area Cont Eq vti               3.2 cm2               MV Velocity Time Integral         12.4 cm               PV Peak Velocity                  1.1 m/s               PV Peak Gradient                  5.1 mmHg              RVOT Peak Velocity                0.83 m/s              * Indicates values subject to auto-interpretation LV EF:  45    % FINDINGS Left Ventricle 3 mL of contrast was used to enhance visualization of the endocardial border. Existing IV was used at the right arm. Normal left ventricular chamber size. Moderate concentric left ventricular hypertrophy. Reduced left ventricular systolic function. The left ventricular ejection fraction is visually estimated to be 45%. Wall motion is difficult to assess with the limitations of image quality, even with the use of echo contrast. Diastolic function is difficult to assess with arrhythmia. Right Ventricle The right ventricle is not well visualized. Right Atrium The right atrium is not well visualized. The inferior vena cava is not well visualized. Left Atrium The left atrium is not well visualized. Mitral Valve Structurally normal mitral valve. No mitral stenosis. Trace mitral regurgitation. Aortic Valve Structurally normal aortic valve. The aortic valve appears trileaflet. No aortic valve stenosis. No aortic insufficiency. Tricuspid Valve The tricuspid valve is not well visualized. No tricuspid stenosis. Trace tricuspid regurgitation. Unable to estimate right ventricular systolic pressure due to an inadequate  tricuspid regurgitant jet. Pulmonic Valve The pulmonic valve is not well visualized. No pulmonic stenosis. No pulmonic insufficiency. Pericardium Pericardium not well visualized. Left pleural effusion present. Aorta Normal aortic root for body surface area. The ascending aorta diameter is 3.0 cm. TayoBismarkbrennen GARCES Panda (Electronically Signed) Final Date:     30 May 2024 11:49    DX-CHEST-PORTABLE (1 VIEW)    Result Date: 5/28/2024 5/28/2024 7:49 PM HISTORY/REASON FOR EXAM: Shortness of Breath TECHNIQUE/EXAM DESCRIPTION:  Single AP view of the chest. COMPARISON: July 25, 2022 FINDINGS: The cardiac silhouette appears within normal limits. The mediastinal contour appears within normal limits.  The central pulmonary vasculature appears normal. Bilateral lung volumes are diminished.  Hazy bilateral lung base opacities are seen. Blunting of bilateral costophrenic angles is seen, compatible with trace bilateral pleural effusions. The bony structures appear age-appropriate.     1.  Hazy bilateral lower lobe infiltrates 2.  Trace bilateral pleural effusions 3.  Atherosclerosis      Micro:  Results       Procedure Component Value Units Date/Time    Pneumocystis DFA [[id number]]     Order Status: Sent Specimen: Respirate from Sputum     CULTURE RESPIRATORY W/ GRM STN [[id number]] Collected: 06/12/24 2012    Order Status: Completed Specimen: Respirate from Sputum Updated: 06/14/24 1319     Significant Indicator NEG     Source RESP     Site SPUTUM     Culture Result Light growth usual upper respiratory shaun including yeast.  No clinically significant Staphylococcus aureus, Methicillin  Resistant Staphylococcus aureus, or Pseudomonas species  isolated.       Gram Stain Result Few WBCs.  Rare epithelial cells.  Few Gram positive cocci.  Specimen Quality Score: 1+      Narrative:      Collected By: [id number] YADIEL SAMUELS    GRAM STAIN [[id number]] Collected: 06/12/24 2012    Order Status: Completed Specimen: Respirate Updated:  06/12/24 2210     Significant Indicator .     Source RESP     Site SPUTUM     Gram Stain Result Few WBCs.  Rare epithelial cells.  Few Gram positive cocci.  Specimen Quality Score: 1+      Narrative:      Collected By: [id number] YADIEL SAMUELS    MRSA By PCR (Amp) [[id number]] Collected: 06/12/24 1045    Order Status: Completed Specimen: Respirate from Nares Updated: 06/12/24 1500     MRSA by PCR Negative    CoV-2, Flu A/B, And RSV by PCR (Cepheid) [[id number]] Collected: 06/11/24 2047    Order Status: Completed Specimen: Respirate from Nasopharyngeal Updated: 06/12/24 0916     Influenza virus A RNA Negative     Influenza virus B, PCR Negative     RSV, PCR Negative     SARS-CoV-2 by PCR NotDetected     Comment: [facility] providers: PLEASE REFER TO DE-ESCALATION AND RETESTING PROTOCOL  on [email or web address]    **The [employer] GeneXpert Xpress SARS-CoV-2 RT-PCR Test has been made  available for use under the Emergency Use Authorization (EUA) only.          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 complicated by left axillary abcscess s/p drainage [C44.92]     A-fib (HCC) s/p Watchman [I48.91]     Type 2 diabetes mellitus (HCC) [E11.9]     GERD (gastroesophageal reflux disease) [K21.9]     Coronary artery disease s/p stent [I25.10]     Hyperlipidemia [E78.5]      Abnormal CXR-pulm edema with effusions   -possible concomitant pneumonia  -at risk for a multitude of pathogens as well as noninfectious etiologies  Chronic cough dyspnea  Renal transplant  Metastatic SCCA  CHF-EF 45% +orthopnea and leg  swelling  SANEKT on CKD  H/o MRSA        PLAN:   Continue to optimize diuresis  Recommend diagnostic procedure such as bronch if feasible to help define infectious vs noninfectious etiologies of his dyspnea-currently deferred due to clinical improvement  Do not recommend additional empiric treatment for PJP at this time  Fungitell pending  Avoid nephrotoxic agents  Dose adjust levofloxacin for empiric treatment pneumonia -Stop date 6/19/24  FU ID clinic prn  OK to see Luis Alfredo JUAREZ  Will sign off-please reconsult if needed       Plan of care discussed with TIFFANY Casey M.D.. Will sign off

## 2024-06-16 NOTE — PROGRESS NOTES
Cardiology Follow Up Progress Note    Date of Service  6/16/2024    Attending Physician  Charlie Casey M.D.    Chief Complaint     A-fib RVR    HPI  Faraz Altman is a 68 y.o. male admitted 6/11/2024 with     PMH: Inferior MI status post BMS to RCA (2013, [facility]), polycystic kidney disease status post kidney transplant 2010, chronic renal insufficiency, baseline creatinine 1.8, chronic immunosuppression, diabetes, hyperlipidemia, permanent A-fib successful PIPE closure 1/9/2024, metastatic squamous cell carcinoma left axillary region       Interim Events    No overnight cardiac events  [id number]-rhd, HR   -114  SANKET SCR 2.13      Review of Systems  Review of Systems   Constitutional:  Positive for fatigue.   Respiratory:  Positive for cough and shortness of breath. Negative for apnea, chest tightness, wheezing and stridor.        Vital signs in last 24 hours  Temp:  [36.2 °C (97.2 °F)-36.5 °C (97.7 °F)] 36.3 °C (97.3 °F)  Pulse:  [64-93] 89  Resp:  [16-19] 19  BP: ()/(56-89) 101/60  SpO2:  [91 %-99 %] 93 %    Physical Exam  Physical Exam  Cardiovascular:      Rate and Rhythm: Normal rate. Rhythm irregular.      Pulses: Normal pulses.   Pulmonary:      Effort: No respiratory distress.      Breath sounds: No wheezing.   Skin:     General: Skin is warm.   Neurological:      Mental Status: He is alert. Mental status is at baseline.   Psychiatric:         Mood and Affect: Mood normal.         Lab Review  Lab Results   Component Value Date/Time    WBC 10.1 06/16/2024 07:08 AM    RBC 4.93 06/16/2024 07:08 AM    HEMOGLOBIN 12.6 (L) 06/16/2024 07:08 AM    HEMATOCRIT 41.5 (L) 06/16/2024 07:08 AM    MCV 84.2 06/16/2024 07:08 AM    MCH 25.6 (L) 06/16/2024 07:08 AM    MCHC 30.4 (L) 06/16/2024 07:08 AM    MPV 13.4 (H) 06/05/2024 08:09 AM      Lab Results   Component Value Date/Time    SODIUM 137 06/16/2024 07:08 AM    POTASSIUM 5.0 06/16/2024 07:08 AM    CHLORIDE 99 06/16/2024 07:08 AM    CO2 20  06/16/2024 07:08 AM    GLUCOSE 211 (H) 06/16/2024 07:08 AM    BUN 67 (H) 06/16/2024 07:08 AM    CREATININE 2.13 (H) 06/16/2024 07:08 AM    CREATININE 2.4 (H) 12/18/2006 06:20 AM      Lab Results   Component Value Date/Time    ASTSGOT 26 06/16/2024 07:08 AM    ALTSGPT 21 06/16/2024 07:08 AM     Lab Results   Component Value Date/Time    CHOLSTRLTOT 125 02/16/2024 08:14 AM    LDL 47 02/16/2024 08:14 AM    HDL 53 02/16/2024 08:14 AM    TRIGLYCERIDE 127 02/16/2024 08:14 AM    TROPONINT 89 (H) 05/29/2024 09:52 AM       Recent Labs     06/14/24  0544 06/15/24  0811   NTPROBNP 7303* 7419*       Cardiac Imaging and Procedures Review  EKG:  atrial fib HR 95    Echocardiogram:    Compared to the prior study on 7/2/2022, there has no changes.  Moderate concentric left ventricular hypertrophy.   The left ventricular ejection fraction is visually estimated to be 45%.  No evidence of valvular abnormality based on Doppler evaluation.     Cardiac Catheterization:    1/9/2024  Successful PIPE closure    Imaging  Chest X-Ray:     Stable cardiomegaly and interstitial edema.     2.  Stable bibasilar atelectasis/consolidation and bilateral pleural effusions.    Stress Test:        Assessment/Plan    # A-fib RVR.  # History of permanent A-fib.  # Status post Watchman procedure, 1/924.  # Renal transplant, 2010  #  immunosuppressant  # Pneumonia  # SANKET on CKD.  # LVE 45%  # Volume overload    -A-fib RVR likely secondary to current pneumonia & pulmonary edema.  Currently heart rate well-controlled ~90's.  -Continue furosemide 40 IV twice daily.  -Strict intake and output.  -Keep K above 4, Mg above 2.  -On Levaquin for pneumonia.  -Toprol-XL 25 BID uptitrate for heart rate less than 110 at rest if BP allows.  -No further cardiac recommendations.  -Follow-up in cardiology clinic, will arrange.    I personally spent a total of 15 minutes which includes face-to-face time and non-face-to-face time spent on preparing to see the patient,  reviewing hospital notes and tests, obtaining history from the patient, performing a medically appropriate exam, counseling and educating the patient, ordering medications/tests/procedures/referrals as clinically indicated, and documenting information in the electronic medical record.       Thank you for allowing me to participate in the care of this patient.  Cardiology will sign off on this patient    Please contact me with any questions.    YI Savage.   Cardiologist, [facility]  [phone]

## 2024-06-16 NOTE — PROGRESS NOTES
[facility] Internal Medicine Daily Progress Note    Date of Service  6/16/2024    R Team: R IM Green Team   Attending: Charlie Casey M.d.  Senior Resident: Dr. Teresa Estrada  Intern:  Dr. Prince Simental   Contact Number: [phone]    Chief Complaint  Jama Altman is a 68 y.o. male admitted 6/11/2024 with leg swelling and dizziness    Hospital Course  Jama Altman is a 68 y.o. 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 CAD s/p stent, renal transplant 2010 on chronic immunosuppressant therapy, (hx of polycystic kidney disease), HLD, HTN and PAD who was admitted due to hypervolemia in the setting of acute on chronic kidney injury and acute hypoxic failure.    At ED,  saturating 94% at 2 L of oxygen but afebrile.  CMP was remarkable for elevated BUN of 80 and elevated creatinine of 2.59.  Chest x-ray presented hypoinflation with bibasilar atelectasis/pneumonitis and small pleural effusion without significant changes compared to 5/28/24 chest x-ray.  At ED, patient received IV Lasix of 60 mg.   Nephrology has been following and recommended diuresing.  Has been treating with antibiotics for possible pneumonia.  D-dimer was elevated. With acute kidney injury and history of Kidney transplant, V/Q scan was ordered but patient could not lie down flat more than 15 mins so V/Q scan got canceled. Patient agreed to start heparin to treat possible pulmonary embolism after discussion benefit and risk of heparin including bleeding risk. Cardiologist consulted for atrial fibrillation with RVR on exertion. Infectious disease was consulted and recommended 7 days of levofloxacin. Pulm was consulted, recommended discontinuing heparin (no concern for PE), recommended continuing Abx and diuresing. CT thorax w/o if his symptom is not improving.        Interval Problem  Update  No acute overnight events  Switched to Levofloxacin starting yesterday (6/15/24)  Discontinued Heparin   Pulmonology was consulted.     Subjective  Patient stated that he is breathing better than yesterday. No nose bleed overnight. Did not sleep well     I have discussed this patient's plan of care and discharge plan at IDT rounds today with Case Management, Nursing, Nursing leadership, and other members of the IDT team.    Consultants/Specialty  nephrology- Dr. Wilder   Cardiology  Infectious disease  Pulmonology     Code Status  Full Code    Disposition  The patient is not medically cleared for discharge to home or a post-acute facility.      I have placed the appropriate orders for post-discharge needs.    Review of Systems  Review of Systems   Constitutional:  Negative for chills and fever.   Respiratory:  Positive for cough, sputum production and shortness of breath. Negative for hemoptysis and wheezing.         Coughing and SOB has been improving   Cardiovascular:  Positive for orthopnea. Negative for chest pain and leg swelling.        Leg swelling improving   Gastrointestinal:  Negative for nausea and vomiting.   Neurological:  Positive for dizziness.        Dizziness when he walks        Physical Exam  Temp:  [36.2 °C (97.2 °F)-36.5 °C (97.7 °F)] 36.3 °C (97.3 °F)  Pulse:  [64-93] 89  Resp:  [16-19] 19  BP: ()/(56-89) 101/60  SpO2:  [91 %-99 %] 93 %    Physical Exam  Vitals reviewed.   Constitutional:       General: He is not in acute distress.     Appearance: He is not ill-appearing, toxic-appearing or diaphoretic.   HENT:      Head: Normocephalic and atraumatic.      Mouth/Throat:      Mouth: Mucous membranes are moist.      Pharynx: Oropharynx is clear. No oropharyngeal exudate.   Eyes:      Extraocular Movements: Extraocular movements intact.      Pupils: Pupils are equal, round, and reactive to light.   Cardiovascular:      Rate and Rhythm: Normal rate. Rhythm irregular.      Heart  sounds: No murmur heard.  Pulmonary:      Effort: Pulmonary effort is normal. No respiratory distress.   Abdominal:      General: Abdomen is flat. There is no distension.      Palpations: Abdomen is soft.      Tenderness: There is no abdominal tenderness. There is no guarding or rebound.   Musculoskeletal:         General: Normal range of motion.      Cervical back: Normal range of motion. No rigidity.      Right lower leg: No edema.      Left lower leg: No edema.      Comments: Swelling on left upper extremity, patient stated its chronic   Skin:     General: Skin is warm and dry.      Coloration: Skin is not jaundiced.      Findings: Lesion present.      Comments: lesion dressing applied on left axillary region  Metastatic cancer with necrosis   Neurological:      General: No focal deficit present.      Mental Status: He is alert and oriented to person, place, and time.      Cranial Nerves: No cranial nerve deficit.   Psychiatric:         Mood and Affect: Mood normal.         Behavior: Behavior normal.         Fluids    Intake/Output Summary (Last 24 hours) at 6/16/2024 1120  Last data filed at 6/16/2024 1042  Gross per 24 hour   Intake 1240 ml   Output 500 ml   Net 740 ml       Laboratory  Recent Labs     06/15/24  0811 06/16/24  0708   WBC 9.2 10.1   RBC 4.97 4.93   HEMOGLOBIN 12.6* 12.6*   HEMATOCRIT 41.8* 41.5*   MCV 84.1 84.2   MCH 25.4* 25.6*   MCHC 30.1* 30.4*   RDW 50.2* 50.7*   PLATELETCT 64* 65*     Recent Labs     06/14/24  0544 06/14/24  2345 06/16/24  0708   SODIUM 140 139 137   POTASSIUM 4.7 4.7 5.0   CHLORIDE 102 100 99   CO2 24 25 20   GLUCOSE 202* 293* 211*   BUN 73* 71* 67*   CREATININE 2.34* 2.45* 2.13*   CALCIUM 9.2 9.1 9.1     Recent Labs     06/14/24  1647   APTT 31.7   INR 0.95               Imaging  DX-CHEST-2 VIEWS   Final Result      1.  Stable cardiomegaly and interstitial edema.      2.  Stable bibasilar atelectasis/consolidation and bilateral pleural effusions.      DX-CHEST-PORTABLE (1  VIEW)   Final Result         1.  Pulmonary edema and/or infiltrates, increased since prior study.   2.  Moderate bilateral layering pleural effusions   3.  Cardiomegaly   4.  Atherosclerosis      DX-CHEST-2 VIEWS   Final Result      Small effusions with bibasilar atelectasis/infiltrate. There is also mildly increased right upper lobe infiltrate.      DX-CHEST-PORTABLE (1 VIEW)   Final Result      Hypoinflation with bibasilar atelectasis/pneumonitis and trace/small pleural effusions. No significant change.            Assessment/Plan  Problem Representation:    * Acute respiratory failure with hypoxia (HCC)- (present on admission)  Assessment & Plan  Patient has been endorsing shortness of breath and orthopnea.  Hypervolemic on assessment.  Requiring 2 L oxygen at ED.  Chest x-ray presented hypoinflation with bibasilar atelectasis/pneumonitis and small pleural effusion without significant changes compared to 5/28/24 chest x-ray. 2 view CXR with RUL infiltrate, procal elevated 0.4 on admission   Likely due to fluid overload from acute kidney injury, possible pneumonia (high risk due to immunosuppression), less likely acute on chronic heart failure. MRSA nares positive March    (6/13/24) increased oxygen requirement overnight.  Two-view chest x-ray was unremarkable compared to yesterday.  Repeat procalcitonin trending down to 0.34.   (6/14/24) Pro-Randy trending down 0.25.  D-dimer was elevated.  Discussed risk and benefit to start heparin to treat possible pulmonary embolism, patient agreed.  Started heparin  (6/15/24) still require 1.5 L of oxygen same as yesterday.  Discontinued Metolazone. ID switched to Levofloxacin  (6/16/24) Went up to 2L of O2. Pulm was consulted, recommended discontinuing heparin (no concern for PE), recommended continuing Abx and diuresing. CT thorax w/o if his symptom is not improving.  Heparin discontinued  Heparin 6/14 - 16  -Transition to oral Lasix 40 mg twice daily starting from  6/13/2024  -Levofloxacin started 6/16/24 CAP  -Sputum culture presented a few gram-positive growth        Orthostatic dizziness  Assessment & Plan  Endorses worsening dizziness and lightheadedness when he is standing up from sitting up. Negative orthostatics by blood pressure but pulse does increase dramatically with ambulation  Has been tachycardic 160s by standing and walking.  (6/14) walking challenge presented patient only able to walk to the door and has significant dizziness with elevated heart rate 150s-160s.  (6/15) cardiologist consulted for A-fib RVR on exertion.  Recommended titrating metoprolol.  (6/16) HR labile 60 - 100s on resting  -Fall risk precaution  -Increase metoprolol SR to 75 mg from 50 mg on 6/15/24    Hypervolemia  Assessment & Plan  Patient endorses 5 pounds weight gain past 3 weeks.  Has been following [facility].  Was sent to ED by recommended from nephrologist due to unresponsive p.o. Lasix.  -Nephrology consulted, see notes for further details but in short  - IV Lasix 40 mg BID, transition to oral Lasix 6/13  -Fluid restriction to 1L, low salt diet  -Daily standing weights  -Daily renal panel with mg    Acute on chronic renal insufficiency- (present on admission)  Assessment & Plan  Baseline creatinine 1.7 - 2.0  Nephrology consulted  -Same plan as hypervolemia    Lymphedema on LUE  Assessment & Plan  Chronic lymphedema on left upper extremity  US LUE 5/4/24 presented No gross evidence of left upper extremity DVT or SVT.   - CTM    HFmrEF  Assessment & Plan  Echocardiogram 5/30/24 presented LVEF of 45% with moderate concentric left ventricular hypertrophy.  Possibly, acute respiratory failure secondary to cardiorenal syndrome or acute on chronic heart failure but less likely   -Same plan as hypervolemia    Stage 4 squamous cell cancer of left axillary region complicated by left axillary abcscess s/p drainage- (present on admission)  Assessment & Plan  Has been followed by  outpatient oncologist.   Per patient, he is currently on immunotherapy and last therapy was on last Friday (6/7/24)  -Continue home prednisone and Sirolimus  - Wound consult placed  -Continue home gabapentin    A-fib (HCC) s/p Watchman- (present on admission)  Assessment & Plan  -Same plan as orthostatic dizziness  - Telemetry monitoring     Type 2 diabetes mellitus (HCC)- (present on admission)  Assessment & Plan  Hem A1c 11.2 (5/23/24)  Home glargine 20 units  -Glargine 16 units  -SSI  -Hypoglycemia protocol      GERD (gastroesophageal reflux disease)- (present on admission)  Assessment & Plan  - Continue home omeprazole    Coronary artery disease s/p stent- (present on admission)  Assessment & Plan  -Continue home aspirin and atorvastatin    Hyperlipidemia- (present on admission)  Assessment & Plan  -Continue home atorvastatin         VTE prophylaxis: heparin ppx    I have performed a physical exam and reviewed and updated ROS and Plan today (6/16/2024). In review of yesterday's note (6/15/2024), there are no changes except as documented above.

## 2024-06-16 NOTE — PROGRESS NOTES
"CARDIOLOGY FOLLOW UP    Impressions:  #. Perm AF with RVR   #. ? Volume overload, BNP up over baseline  #. Acute on CKD 3 with prior renal Txp  #. RBBB  #. S/p watchman  #. Hx of PCI  #. Metastatic SCC on immunotherapy  #. DM    Recommendations:  Metoprolol - titrate as needed for HR control, seems out of worst of PNA so reasonable to shoot for HR <100    Lasix per nephrology    No need for anticoagulation given watchman    Cont aspirin/statin    Will sign off. Please call with questions. Thank you.     SUBJECTIVE/INTERIM EVENTS:  Still SOB, on O2, no edema. Urinating with lasix    EXAM:  /60   Pulse 89   Temp 36.3 °C (97.3 °F) (Temporal)   Resp 19   Ht 1.981 m (6' 6\")   Wt 99.2 kg (218 lb 11.1 oz)   SpO2 93%   BMI 25.27 kg/m²   GEN: NAD  NECK: No appreciable JVD   CARDIAC: Irregular, Normal S1, S2 without murmur  VASCULATURE: No carotid bruit  RESP: Clear to auscultation bilaterally  ABD: Soft, non-tender, non-distended  EXT: No edema  NEURO: Symmetric face.    Studies interpreted by me: ECG: AF, RBBB    Studies  Lab Results   Component Value Date/Time    CHOLSTRLTOT 125 02/16/2024 08:14 AM    LDL 47 02/16/2024 08:14 AM    HDL 53 02/16/2024 08:14 AM    TRIGLYCERIDE 127 02/16/2024 08:14 AM       Lab Results   Component Value Date/Time    SODIUM 137 06/16/2024 07:08 AM    POTASSIUM 5.0 06/16/2024 07:08 AM    CHLORIDE 99 06/16/2024 07:08 AM    CO2 20 06/16/2024 07:08 AM    GLUCOSE 211 (H) 06/16/2024 07:08 AM    BUN 67 (H) 06/16/2024 07:08 AM    CREATININE 2.13 (H) 06/16/2024 07:08 AM    CREATININE 2.4 (H) 12/18/2006 06:20 AM      Lab Results   Component Value Date/Time    PROTHROMBTM 12.7 06/14/2024 04:47 PM    INR 0.95 06/14/2024 04:47 PM      Lab Results   Component Value Date/Time    WBC 10.1 06/16/2024 07:08 AM    RBC 4.93 06/16/2024 07:08 AM    HEMOGLOBIN 12.6 (L) 06/16/2024 07:08 AM    HEMATOCRIT 41.5 (L) 06/16/2024 07:08 AM    MCV 84.2 06/16/2024 07:08 AM    MCH 25.6 (L) 06/16/2024 07:08 AM    MCHC " 30.4 (L) 06/16/2024 07:08 AM    MPV 13.4 (H) 06/05/2024 08:09 AM    NEUTSPOLYS 84.70 (H) 06/16/2024 07:08 AM    LYMPHOCYTES 6.70 (L) 06/16/2024 07:08 AM    MONOCYTES 6.50 06/16/2024 07:08 AM    EOSINOPHILS 0.30 06/16/2024 07:08 AM    BASOPHILS 0.20 06/16/2024 07:08 AM    ANISOCYTOSIS 1+ 06/11/2024 11:04 AM

## 2024-06-16 NOTE — PROGRESS NOTES
Patients dressing changed per orders. Noon vitals taken, BP 85/53 patient is not symptomatic. MD Simental notified, holding metoprolol per parameters. Patient given fresh cup of water, instructed to drink to help increase BP per MD Simental. Patient requesting to rest.

## 2024-06-16 NOTE — PROGRESS NOTES
"[facility] -  PROGRESS NOTE               Author: Fish Wilder M.D. Date & Time: 6/16/2024  11:44 AM     HPI:  Patient is a 68 year old male with a PMHx of afib, CAD, renal transplant in 2010, PKD, HLD, HTN, and squamous cell carcinoma of the left axillary region on immunotherapy via Dr. Fajardo, DM with last A1c of 6.9%, who presented to hospital for SOB from outpatient nephrology clinic.  Pt also has orthopnea and significant lower extremity edema.  He is currently on sirolimus and pred for his transplant. Scr from 6/5 was 2.89 and his baseline is around 1.5-1.9. Nephrology was asked to consult for SANKET. Scr in the ER was 2.56. He states his SOB has been worsening over the past 3 weeks.      No F/C/N/V/CP/He does have SOB.  No melena, hematochezia, hematemesis.  No HA, visual changes, or abdominal pain. + edema bilateral LE    DAILY NEPHROLOGY SUMMARY:  6/12 - No new overnight renal events. Scr is 2.47 today. Diuresing well.   6/13 - Scr is stable. Good UOP. Feels better.   6/14 - Scr continues to head in the right direction. 2.34. HR 88-96 HR and then spikes to 160+ when standing or ambulating.    6/15 - No new overnight renal events. Afib , up to 160-170 when ambulating or standing. Scr is holding steady. Still SOB. Bilateral LE has improved significantly.  6/16 - No new overnight renal events. Diuresing well. Scr is down to 2.13.     REVIEW OF SYSTEMS:    10 point ROS reviewed and is as per HPI/daily summary or otherwise negative    PMH/PSH/SH/FH:   Reviewed and unchanged since admission note    CURRENT MEDICATIONS:   Reviewed from admission to present day    VS:  /60   Pulse 89   Temp 36.3 °C (97.3 °F) (Temporal)   Resp 19   Ht 1.981 m (6' 6\")   Wt 99.2 kg (218 lb 11.1 oz)   SpO2 93%   BMI 25.27 kg/m²     Physical Exam  Vitals and nursing note reviewed.     Constitutional:       General: He is not in acute distress.     Appearance: He is obese. He is not " ill-appearing.   HENT:      Head: Normocephalic and atraumatic.      Nose: Nose normal.      Mouth/Throat:      Mouth: Mucous membranes are moist.      Pharynx: Oropharynx is clear.   Eyes:      Extraocular Movements: Extraocular movements intact.      Conjunctiva/sclera: Conjunctivae normal.      Pupils: Pupils are equal, round, and reactive to light.   Cardiovascular:      Rate and Rhythm: Normal rate and regular rhythm.   Pulmonary:      Effort: No respiratory distress.      Breath sounds: minimal tanya crackles present.   Abdominal:      General: Abdomen is flat. Bowel sounds are normal.      Palpations: Abdomen is soft.   Musculoskeletal:      Cervical back: Normal range of motion and neck supple.      Right lower leg: Edema present.      Left lower leg: Edema present.   Neurological:      General: No focal deficit present.      Mental Status: He is alert and oriented to person, place, and time. Mental status is at baseline.   Psychiatric:         Mood and Affect: Mood normal.         Behavior: Behavior normal.         Thought Content: Thought content normal.         Judgment: Judgment normal.     Fluids:  In: 1000 [P.O.:1000]  Out: 1150     LABS:  Recent Labs     06/14/24  0544 06/14/24  2345 06/16/24  0708   SODIUM 140 139 137   POTASSIUM 4.7 4.7 5.0   CHLORIDE 102 100 99   CO2 24 25 20   GLUCOSE 202* 293* 211*   BUN 73* 71* 67*   CREATININE 2.34* 2.45* 2.13*   CALCIUM 9.2 9.1 9.1       IMAGING:   All imaging reviewed from admission to present day    IMPRESSION:  # SANKET    - Improved since last Scr of 2.89 from 6/5/24. Baseline Scr is ~1.9  # Renal transplant    - Continue with home IS medications (Pred and Sirolimus)  # h/o ADPKD  # Mild hyperkalemia  # Acute pulmonary edema  # Desaturation to 85% as outpatient  # Acidosis  # Chronic Immunosuppression on medications (sirolimus and prednisone)  # Type 2 DM - last A1c was 6.9%  # Proteinuria  # Atrial fibrillation  # Vit d deficiency  # Squamous Cell Ca - getting  immunotherapy/followed by Dr. Fajardo. He has mets to lymph nodes.   # Anemia      PLAN:  - There is no acute need for RRT.   - Creatinine is improving. He does not want dialysis if it comes to it.   - Continue PO furosemide BID  - Low salt diet  - Continue with home IS medications  - Dose all meds per eGFR   - Rest of management per primary team

## 2024-06-16 NOTE — CARE PLAN
The patient is Watcher - Medium risk of patient condition declining or worsening    Shift Goals  Clinical Goals: wound care, monitor heparin gtt, monitor O2 sats  Patient Goals: rest  Family Goals: no family present    Progress made toward(s) clinical / shift goals:      Patient is not progressing towards the following goals:    Patient alert and oriented x4, able to communicate needs with use of call light. Continues to experience episodes of Afib RVR with activity, MD aware pt asymptomatic. 2L of O2 via nasal cannula. Heparin gtt continued. Pain managed with prn Tylenol, see mar for administration.       Problem: Pain - Standard  Goal: Alleviation of pain or a reduction in pain to the patient’s comfort goal  Outcome: Progressing     Problem: Skin Integrity - Diabetes  Goal: Patient's skin on legs and feet will remain intact while hospitalized  Outcome: Progressing     Problem: Respiratory  Goal: Patient will achieve/maintain optimum respiratory ventilation and gas exchange  Outcome: Progressing

## 2024-06-16 NOTE — CARE PLAN
The patient is Watcher - Medium risk of patient condition declining or worsening    Shift Goals  Clinical Goals: monitor heparin gtt, wound care, pain management  Patient Goals: rest  Family Goals: no family present    Patient alert and oriented x4, able to communicate needs effectively with use of call light. 2.5L of oxygen. Pain managed with prn Tylenol. Heparin gtt discontinued. Wound care completed per orders. Soft BPs throughout this shift, pt asymptomatic, MD aware. Encouraging water intake per fluid restriction. Good urine output, x1BM this shift.    Progress made toward(s) clinical / shift goals:      Patient is not progressing towards the following goals:      Problem: Pain - Standard  Goal: Alleviation of pain or a reduction in pain to the patient’s comfort goal  Outcome: Progressing     Problem: Skin Integrity - Diabetes  Goal: Patient's skin on legs and feet will remain intact while hospitalized  Outcome: Progressing     Problem: Respiratory  Goal: Patient will achieve/maintain optimum respiratory ventilation and gas exchange  Outcome: Progressing

## 2024-06-16 NOTE — PROGRESS NOTES
Monitor Summary:    Rhythm: A.Fib  HR: 83-91  Ectopy: Frequent PVCs, Bigeminy   -/.15/-    Per strip from monitor room

## 2024-06-16 NOTE — CARE PLAN
The patient is Watcher - Medium risk of patient condition declining or worsening    Shift Goals  Clinical Goals: Wound care, monitor heparin drip, 02 level, rest  Patient Goals: rest  Family Goals: n/a    Progress made toward(s) clinical / shift goals:      Patient is not progressing towards the following goals:      Problem: Knowledge Deficit - Standard  Goal: Patient and family/care givers will demonstrate understanding of plan of care, disease process/condition, diagnostic tests and medications  Outcome: Progressing  Note: Encouraged Pt to not touch right axillary wound bed with his hands during dressing change.            Problem: Pain - Standard  Goal: Alleviation of pain or a reduction in pain to the patient’s comfort goal  Outcome: Progressing  Note: Pt reported feeling painful after dressing change. Pt requested Tylenol 650 mg  narcotic not needed.       Problem: Diabetes Management  Goal: Patient will achieve and maintain glucose in satisfactory range  Outcome: Progressing  Note: Pt refused 2100 blood sugar check and the possible coverage that might follow. Pt stated the insulin at dinner time was sufficient.       Problem: Respiratory  Goal: Patient will achieve/maintain optimum respiratory ventilation and gas exchange  Outcome: Progressing  Note: Pt remains on RA with no s/sx of respiratory distress.      Problem: Urinary Elimination:  Goal: Ability to achieve and maintain adequate renal perfusion and functioning will improve  Outcome: Progressing  Note: Pt uses urinal at bedside voiding clear, yellow, non odorous urine. No report of dysuria.    Xa drawn at 0005 by

## 2024-06-17 LAB
1 3 BETA D GLUCAN INTERP Q4483: NEGATIVE
1,3 BETA GLUCAN SER-MCNC: 44 PG/ML
ALBUMIN SERPL BCP-MCNC: 2.9 G/DL (ref 3.2–4.9)
ALBUMIN/GLOB SERPL: 1.2 G/DL
ALP SERPL-CCNC: 63 U/L (ref 30–99)
ALT SERPL-CCNC: 17 U/L (ref 2–50)
ANION GAP SERPL CALC-SCNC: 13 MMOL/L (ref 7–16)
AST SERPL-CCNC: 18 U/L (ref 12–45)
BILIRUB SERPL-MCNC: 0.4 MG/DL (ref 0.1–1.5)
BUN SERPL-MCNC: 74 MG/DL (ref 8–22)
CALCIUM ALBUM COR SERPL-MCNC: 9.8 MG/DL (ref 8.5–10.5)
CALCIUM SERPL-MCNC: 8.9 MG/DL (ref 8.5–10.5)
CHLORIDE SERPL-SCNC: 96 MMOL/L (ref 96–112)
CO2 SERPL-SCNC: 23 MMOL/L (ref 20–33)
CREAT SERPL-MCNC: 2.54 MG/DL (ref 0.5–1.4)
GFR SERPLBLD CREATININE-BSD FMLA CKD-EPI: 27 ML/MIN/1.73 M 2
GLOBULIN SER CALC-MCNC: 2.4 G/DL (ref 1.9–3.5)
GLUCOSE BLD STRIP.AUTO-MCNC: 228 MG/DL (ref 65–99)
GLUCOSE BLD STRIP.AUTO-MCNC: 241 MG/DL (ref 65–99)
GLUCOSE BLD STRIP.AUTO-MCNC: 244 MG/DL (ref 65–99)
GLUCOSE BLD STRIP.AUTO-MCNC: 304 MG/DL (ref 65–99)
GLUCOSE SERPL-MCNC: 318 MG/DL (ref 65–99)
POTASSIUM SERPL-SCNC: 5.3 MMOL/L (ref 3.6–5.5)
PROT SERPL-MCNC: 5.3 G/DL (ref 6–8.2)
SODIUM SERPL-SCNC: 132 MMOL/L (ref 135–145)

## 2024-06-17 PROCEDURE — 700102 HCHG RX REV CODE 250 W/ 637 OVERRIDE(OP): Performed by: INTERNAL MEDICINE

## 2024-06-17 PROCEDURE — A9270 NON-COVERED ITEM OR SERVICE: HCPCS

## 2024-06-17 PROCEDURE — 700102 HCHG RX REV CODE 250 W/ 637 OVERRIDE(OP): Performed by: NURSE PRACTITIONER

## 2024-06-17 PROCEDURE — 80053 COMPREHEN METABOLIC PANEL: CPT

## 2024-06-17 PROCEDURE — A9270 NON-COVERED ITEM OR SERVICE: HCPCS | Performed by: INTERNAL MEDICINE

## 2024-06-17 PROCEDURE — 99232 SBSQ HOSP IP/OBS MODERATE 35: CPT | Mod: GC | Performed by: INTERNAL MEDICINE

## 2024-06-17 PROCEDURE — A9270 NON-COVERED ITEM OR SERVICE: HCPCS | Performed by: NURSE PRACTITIONER

## 2024-06-17 PROCEDURE — 700102 HCHG RX REV CODE 250 W/ 637 OVERRIDE(OP)

## 2024-06-17 PROCEDURE — 770020 HCHG ROOM/CARE - TELE (206)

## 2024-06-17 PROCEDURE — 36415 COLL VENOUS BLD VENIPUNCTURE: CPT

## 2024-06-17 PROCEDURE — 82962 GLUCOSE BLOOD TEST: CPT | Mod: 91

## 2024-06-17 PROCEDURE — 700111 HCHG RX REV CODE 636 W/ 250 OVERRIDE (IP)

## 2024-06-17 RX ORDER — METOPROLOL SUCCINATE 25 MG/1
25 TABLET, EXTENDED RELEASE ORAL ONCE
Status: DISCONTINUED | OUTPATIENT
Start: 2024-06-17 | End: 2024-06-17 | Stop reason: ALTCHOICE

## 2024-06-17 RX ORDER — LEVOFLOXACIN 500 MG/1
250 TABLET, FILM COATED ORAL EVERY 24 HOURS
Status: COMPLETED | OUTPATIENT
Start: 2024-06-17 | End: 2024-06-19

## 2024-06-17 RX ORDER — METOPROLOL SUCCINATE 25 MG/1
37.5 TABLET, EXTENDED RELEASE ORAL 2 TIMES DAILY
Status: DISCONTINUED | OUTPATIENT
Start: 2024-06-18 | End: 2024-06-18

## 2024-06-17 RX ADMIN — ASPIRIN 81 MG: 81 TABLET, COATED ORAL at 05:34

## 2024-06-17 RX ADMIN — FUROSEMIDE 40 MG: 40 TABLET ORAL at 05:35

## 2024-06-17 RX ADMIN — INSULIN LISPRO 3 UNITS: 100 INJECTION, SOLUTION INTRAVENOUS; SUBCUTANEOUS at 20:43

## 2024-06-17 RX ADMIN — OXYCODONE HYDROCHLORIDE 5 MG: 5 TABLET ORAL at 16:37

## 2024-06-17 RX ADMIN — MOMETASONE FUROATE AND FORMOTEROL FUMARATE DIHYDRATE 2 PUFF: 200; 5 AEROSOL RESPIRATORY (INHALATION) at 05:33

## 2024-06-17 RX ADMIN — ACETAMINOPHEN 650 MG: 325 TABLET, FILM COATED ORAL at 11:13

## 2024-06-17 RX ADMIN — HEPARIN SODIUM 5000 UNITS: 5000 INJECTION, SOLUTION INTRAVENOUS; SUBCUTANEOUS at 16:39

## 2024-06-17 RX ADMIN — FUROSEMIDE 40 MG: 40 TABLET ORAL at 16:38

## 2024-06-17 RX ADMIN — OXYCODONE HYDROCHLORIDE 5 MG: 5 TABLET ORAL at 22:35

## 2024-06-17 RX ADMIN — INSULIN LISPRO 3 UNITS: 100 INJECTION, SOLUTION INTRAVENOUS; SUBCUTANEOUS at 09:41

## 2024-06-17 RX ADMIN — GUAIFENESIN 600 MG: 600 TABLET, EXTENDED RELEASE ORAL at 16:38

## 2024-06-17 RX ADMIN — METOPROLOL SUCCINATE 25 MG: 25 TABLET, EXTENDED RELEASE ORAL at 05:34

## 2024-06-17 RX ADMIN — GABAPENTIN 400 MG: 400 CAPSULE ORAL at 18:00

## 2024-06-17 RX ADMIN — LEVOFLOXACIN 250 MG: 500 TABLET, FILM COATED ORAL at 16:37

## 2024-06-17 RX ADMIN — PREDNISONE 20 MG: 20 TABLET ORAL at 05:34

## 2024-06-17 RX ADMIN — ATORVASTATIN CALCIUM 80 MG: 80 TABLET, FILM COATED ORAL at 22:14

## 2024-06-17 RX ADMIN — GABAPENTIN 400 MG: 400 CAPSULE ORAL at 05:33

## 2024-06-17 RX ADMIN — HEPARIN SODIUM 5000 UNITS: 5000 INJECTION, SOLUTION INTRAVENOUS; SUBCUTANEOUS at 05:33

## 2024-06-17 RX ADMIN — GUAIFENESIN 600 MG: 600 TABLET, EXTENDED RELEASE ORAL at 05:33

## 2024-06-17 RX ADMIN — HEPARIN SODIUM 5000 UNITS: 5000 INJECTION, SOLUTION INTRAVENOUS; SUBCUTANEOUS at 22:15

## 2024-06-17 RX ADMIN — METOPROLOL TARTRATE 25 MG: 25 TABLET, FILM COATED ORAL at 11:13

## 2024-06-17 RX ADMIN — SIROLIMUS 4 MG: 0.5 TABLET ORAL at 05:34

## 2024-06-17 RX ADMIN — OMEPRAZOLE 20 MG: 20 CAPSULE, DELAYED RELEASE ORAL at 05:33

## 2024-06-17 RX ADMIN — INSULIN LISPRO 5 UNITS: 100 INJECTION, SOLUTION INTRAVENOUS; SUBCUTANEOUS at 13:58

## 2024-06-17 RX ADMIN — ACETAMINOPHEN 650 MG: 325 TABLET, FILM COATED ORAL at 18:02

## 2024-06-17 ASSESSMENT — PAIN DESCRIPTION - PAIN TYPE
TYPE: ACUTE PAIN
TYPE: ACUTE PAIN;CHRONIC PAIN
TYPE: ACUTE PAIN
TYPE: ACUTE PAIN;CHRONIC PAIN

## 2024-06-17 ASSESSMENT — ENCOUNTER SYMPTOMS
HEMOPTYSIS: 0
VOMITING: 0
SPUTUM PRODUCTION: 1
DIZZINESS: 1
CHILLS: 0
SHORTNESS OF BREATH: 1
WHEEZING: 0
ORTHOPNEA: 1
FEVER: 0
NAUSEA: 0
COUGH: 1

## 2024-06-17 ASSESSMENT — FIBROSIS 4 INDEX: FIB4 SCORE: 5.85

## 2024-06-17 NOTE — PROGRESS NOTES
Patient tolerated ambulating around room x5 minutes with a HR ranging A.-150s per monitor room. Did report dizziness upon initial stand, stabilized quickly. Patient back in bed with HR A.Fib 80-90s

## 2024-06-17 NOTE — PROGRESS NOTES
[facility] Internal Medicine Daily Progress Note    Date of Service  6/17/2024    R Team: R IM Green Team   Attending: Charlie Casey M.d.  Senior Resident: Dr. Betsy Muñoz  Intern:  Dr. Eliud Oleary  Contact Number: [phone]    Chief Complaint  Jama Altman is a 68 y.o. male admitted 6/11/2024 with leg swelling and dizziness    Hospital Course  Jama Altman is a 68 y.o. 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 CAD s/p stent, renal transplant 2010 on chronic immunosuppressant therapy, (hx of polycystic kidney disease), HLD, HTN and PAD who was admitted due to hypervolemia in the setting of acute on chronic kidney injury and acute hypoxic failure.    At ED,  saturating 94% at 2 L of oxygen but afebrile.  CMP was remarkable for elevated BUN of 80 and elevated creatinine of 2.59.  Chest x-ray presented hypoinflation with bibasilar atelectasis/pneumonitis and small pleural effusion without significant changes compared to 5/28/24 chest x-ray.  At ED, patient received IV Lasix of 60 mg.   Nephrology has been following and recommended diuresing.  Has been treating with antibiotics for possible pneumonia.  D-dimer was elevated. With acute kidney injury and history of Kidney transplant, V/Q scan was ordered but patient could not lie down flat more than 15 mins so V/Q scan got canceled. Patient agreed to start heparin to treat possible pulmonary embolism after discussion benefit and risk of heparin including bleeding risk. Cardiologist consulted for atrial fibrillation with RVR on exertion. Infectious disease was consulted and recommended 7 days of levofloxacin. Pulm was consulted, recommended discontinuing heparin (no concern for PE), recommended continuing Abx and diuresing. CT thorax w/o if his symptom is not improving.        Interval Problem Update  No  acute overnight events, patient feels improvement  Continue Levofloxacin starting on 6/15/24, last dose 6/21/24  Patient heart rate increased 170-180 once ambulate, give 1 extra dose of metoprolol to increase the total dose of metoprolol to 75 mg daily.   Glucose is high on both fasting and Premeal, increased glargine to 23      Subjective  Patient stated that he is breathing better and had a good sleep overnight.     I have discussed this patient's plan of care and discharge plan at IDT rounds today with Case Management, Nursing, Nursing leadership, and other members of the IDT team.    Consultants/Specialty  nephrology- Dr. Wilder   Cardiology  Infectious disease  Pulmonology     Code Status  Full Code    Disposition  The patient is not medically cleared for discharge to home or a post-acute facility.      Review of Systems  Review of Systems   Constitutional:  Negative for chills and fever.   Respiratory:  Positive for cough, sputum production and shortness of breath. Negative for hemoptysis and wheezing.         Coughing and SOB has been improving   Cardiovascular:  Positive for orthopnea. Negative for chest pain and leg swelling.        Leg swelling improving   Gastrointestinal:  Negative for nausea and vomiting.   Neurological:  Positive for dizziness.        Dizziness when he walks        Physical Exam  Temp:  [36.2 °C (97.2 °F)-36.6 °C (97.9 °F)] (P) 36.3 °C (97.3 °F)  Pulse:  [82-98] (P) 82  Resp:  [18] (P) 18  BP: ()/(60-69) (P) 99/58  SpO2:  [92 %-97 %] (P) 94 %    Physical Exam  Vitals reviewed.   Constitutional:       General: He is not in acute distress.     Appearance: He is not ill-appearing, toxic-appearing or diaphoretic.   HENT:      Head: Normocephalic and atraumatic.      Mouth/Throat:      Mouth: Mucous membranes are moist.      Pharynx: Oropharynx is clear. No oropharyngeal exudate.   Eyes:      Extraocular Movements: Extraocular movements intact.      Pupils: Pupils are equal, round,  and reactive to light.   Cardiovascular:      Rate and Rhythm: Normal rate. Rhythm irregular.      Heart sounds: No murmur heard.  Pulmonary:      Effort: Pulmonary effort is normal. No respiratory distress.   Abdominal:      General: Abdomen is flat. There is no distension.      Palpations: Abdomen is soft.      Tenderness: There is no abdominal tenderness. There is no guarding or rebound.   Musculoskeletal:         General: Normal range of motion.      Cervical back: Normal range of motion. No rigidity.      Right lower leg: No edema.      Left lower leg: No edema.      Comments: Swelling on left upper extremity, patient stated its chronic   Skin:     General: Skin is warm and dry.      Coloration: Skin is not jaundiced.      Findings: Lesion present.      Comments: lesion dressing applied on left axillary region  Metastatic cancer with necrosis   Neurological:      General: No focal deficit present.      Mental Status: He is alert and oriented to person, place, and time.      Cranial Nerves: No cranial nerve deficit.   Psychiatric:         Mood and Affect: Mood normal.         Behavior: Behavior normal.         Fluids    Intake/Output Summary (Last 24 hours) at 6/17/2024 1637  Last data filed at 6/17/2024 1200  Gross per 24 hour   Intake 560 ml   Output 1100 ml   Net -540 ml       Laboratory  Recent Labs     06/15/24  0811 06/16/24  0708   WBC 9.2 10.1   RBC 4.97 4.93   HEMOGLOBIN 12.6* 12.6*   HEMATOCRIT 41.8* 41.5*   MCV 84.1 84.2   MCH 25.4* 25.6*   MCHC 30.1* 30.4*   RDW 50.2* 50.7*   PLATELETCT 64* 65*     Recent Labs     06/14/24  2345 06/16/24  0708 06/17/24  0952   SODIUM 139 137 132*   POTASSIUM 4.7 5.0 5.3   CHLORIDE 100 99 96   CO2 25 20 23   GLUCOSE 293* 211* 318*   BUN 71* 67* 74*   CREATININE 2.45* 2.13* 2.54*   CALCIUM 9.1 9.1 8.9     Recent Labs     06/14/24  1647   APTT 31.7   INR 0.95               Imaging  DX-CHEST-2 VIEWS   Final Result      1.  Stable cardiomegaly and interstitial edema.       2.  Stable bibasilar atelectasis/consolidation and bilateral pleural effusions.      DX-CHEST-PORTABLE (1 VIEW)   Final Result         1.  Pulmonary edema and/or infiltrates, increased since prior study.   2.  Moderate bilateral layering pleural effusions   3.  Cardiomegaly   4.  Atherosclerosis      DX-CHEST-2 VIEWS   Final Result      Small effusions with bibasilar atelectasis/infiltrate. There is also mildly increased right upper lobe infiltrate.      DX-CHEST-PORTABLE (1 VIEW)   Final Result      Hypoinflation with bibasilar atelectasis/pneumonitis and trace/small pleural effusions. No significant change.            Assessment/Plan  Problem Representation:    * Acute respiratory failure with hypoxia (HCC)- (present on admission)  Assessment & Plan  Patient has been endorsing shortness of breath and orthopnea.  Hypervolemic on assessment.  Requiring 2 L oxygen at ED.  Chest x-ray presented hypoinflation with bibasilar atelectasis/pneumonitis and small pleural effusion without significant changes compared to 5/28/24 chest x-ray. 2 view CXR with RUL infiltrate, procal elevated 0.4 on admission   Likely due to fluid overload from acute kidney injury, possible pneumonia (high risk due to immunosuppression), less likely acute on chronic heart failure. MRSA nares positive March    (6/13/24) increased oxygen requirement overnight.  Two-view chest x-ray was unremarkable compared to yesterday.  Repeat procalcitonin trending down to 0.34.   (6/14/24) Pro-Randy trending down 0.25.  D-dimer was elevated.  Discussed risk and benefit to start heparin to treat possible pulmonary embolism, patient agreed.  Started heparin  (6/15/24) still require 1.5 L of oxygen same as yesterday.  Discontinued Metolazone. ID switched to Levofloxacin  (6/16/24) Went up to 2L of O2. Pulm was consulted, recommended discontinuing heparin (no concern for PE), recommended continuing Abx and diuresing. CT thorax w/o if his symptom is not improving.   Heparin discontinued  Heparin 6/14 - 16  (6/17/24) Patient feels improvement.   -Transition to oral Lasix 40 mg twice daily starting from 6/13/2024  -Levofloxacin started 6/16/24 CAP  -Sputum culture presented a few gram-positive growth    Lymphedema on LUE  Assessment & Plan  Chronic lymphedema on left upper extremity  US LUE 5/4/24 presented No gross evidence of left upper extremity DVT or SVT.   - CTM    HFmrEF  Assessment & Plan  Echocardiogram 5/30/24 presented LVEF of 45% with moderate concentric left ventricular hypertrophy.  Possibly, acute respiratory failure secondary to cardiorenal syndrome or acute on chronic heart failure but less likely   -Same plan as hypervolemia    Orthostatic dizziness  Assessment & Plan  Endorses worsening dizziness and lightheadedness when he is standing up from sitting up. Negative orthostatics by blood pressure but pulse does increase dramatically with ambulation  Has been tachycardic 160s by standing and walking.  (6/14) walking challenge presented patient only able to walk to the door and has significant dizziness with elevated heart rate 150s-160s.  (6/15) cardiologist consulted for A-fib RVR on exertion.  Recommended titrating metoprolol.  (6/16) HR labile 60 - 100s on resting  (6/17) atrial improves during resting but is still go to as high as 170-180 when ambulating   -Fall risk precaution  -Increase metoprolol SR to 75 mg from 50 mg on 6/15/24    Hypervolemia  Assessment & Plan  Patient endorses 5 pounds weight gain past 3 weeks.  Has been following [facility].  Was sent to ED by recommended from nephrologist due to unresponsive p.o. Lasix.  -Nephrology consulted, appreciate recommendations  - IV Lasix 40 mg BID, transition to oral Lasix 6/13  -Fluid restriction to 1L, low salt diet  -Daily standing weights  -Daily renal panel with mg    Acute on chronic renal insufficiency- (present on admission)  Assessment & Plan  Baseline creatinine 1.7 -  2.0  Nephrology consulted  -Same plan as hypervolemia    Stage 4 squamous cell cancer of left axillary region complicated by left axillary abcscess s/p drainage- (present on admission)  Assessment & Plan  Has been followed by outpatient oncologist.   Per patient, he is currently on immunotherapy and last therapy was on last Friday (6/7/24)  -Continue home prednisone and Sirolimus  - Wound consult placed  -Continue home gabapentin    A-fib (HCC) s/p Watchman- (present on admission)  Assessment & Plan  -Same plan as orthostatic dizziness  - Telemetry monitoring     Type 2 diabetes mellitus (HCC)- (present on admission)  Assessment & Plan  Hem A1c 11.2 (5/23/24)  Home glargine 20 units  -Glargine 23 units  -SSI  -Hypoglycemia protocol    GERD (gastroesophageal reflux disease)- (present on admission)  Assessment & Plan  - Continue home omeprazole    Coronary artery disease s/p stent- (present on admission)  Assessment & Plan  -Continue home aspirin and atorvastatin    Hyperlipidemia- (present on admission)  Assessment & Plan  -Continue home atorvastatin         VTE prophylaxis: heparin ppx    I have performed a physical exam and reviewed and updated ROS and Plan today (6/17/2024). In review of yesterday's note (6/16/2024), there are no changes except as documented above.

## 2024-06-17 NOTE — PROGRESS NOTES
Monitor Summary:    Rhythm: A.Fib  HR:   Ectopy: Frequent PVCs, Trigeminy   -/.13/-    Per strip from monitor room

## 2024-06-17 NOTE — CARE PLAN
The patient is Stable - Low risk of patient condition declining or worsening    Shift Goals  Clinical Goals: Wound care, pain control  Patient Goals: Rest, wound care  Family Goals: JUDITH    Progress made toward(s) clinical / shift goals:    Problem: Pain - Standard  Goal: Alleviation of pain or a reduction in pain to the patient’s comfort goal  Outcome: Progressing  Flowsheets  Taken 6/17/2024 1556 by Christie Rudolph RRudiNRudi  OB Pain Intervention:   Medication - See MAR   Repositioned   Rest  Taken 6/17/2024 0118 by Tien Yepez R.NRudi  Non Verbal Scale:   Calm   Unlabored Breathing   Sleeping  Note: 1. Document pain using the appropriate pain scale per order or unit policy 2. Educate and implement non-pharmacologic comfort measures (i.e. relaxation, distraction, massage, cold/heat therapy, etc.) 3. Pain management medications as ordered 4. Reassess pain after pain med administration per policy 5. If opiods administered assess patient's response to pain medication is appropriate per POSS sedation scale 6. Follow pain management plan developed in collaboration with patient and interdisciplinary team (including palliative care or pain specialists if applicable)      Problem: Skin Integrity  Goal: Skin integrity is maintained or improved  Outcome: Progressing  Note: 1. Assess and monitor skin integrity, appearance and/or temperature 2. Assess risk factors for impaired skin integrity and/or pressures ulcers 3. Implement precautions to protect skin integrity in collaboration with interdisciplinary team 4. Implement pressure ulcer prevention protocol if at risk for skin breakdown 5. Confirm wound care consult if at risk for skin breakdown 6. Ensure patient use of pressure relieving devices        Patient is not progressing towards the following goals:  NA

## 2024-06-17 NOTE — DISCHARGE PLANNING
Care Transition Team Assessment    LSW met with pt at bedside and completed chart review to obtain the information used in this assessment. Pt A&Ox4 and able to verify the information on the face sheet. Pt is independent with all ADLs/IADLs and does not use any DME at baseline. Pt has good DC support and his family will provide transportation home upon DC. Pt reported he has an advance directive and his son, Afshin, is his DPOA. No ACP documents on file.    Information Source  Orientation Level: Oriented X4  Information Given By: Patient  Informant's Name: Faraz Altman  Who is responsible for making decisions for patient? : Patient    Readmission Evaluation  Is this a readmission?: Yes - unplanned readmission    Elopement Risk  Legal Hold: No  Ambulatory or Self Mobile in Wheelchair: Yes  Disoriented: No  Psychiatric Symptoms: None  History of Wandering: No  Elopement this Admit: No  Vocalizing Wanting to Leave: No  Displays Behaviors, Body Language Wanting to Leave: No-Not at Risk for Elopement  Elopement Risk: Not at Risk for Elopement    Interdisciplinary Discharge Planning  Lives with - Patient's Self Care Capacity: Spouse  Patient or legal guardian wants to designate a caregiver: No  Support Systems: Family Member(s), Spouse / Significant Other  Housing / Facility: [address]    Discharge Preparedness  What is your plan after discharge?: Home with help  What are your discharge supports?: Child  Prior Functional Level: Ambulatory, Drives Self, Independent with Activities of Daily Living, Independent with Medication Management  Difficulity with ADLs: None  Difficulity with IADLs: None    Functional Assesment  Prior Functional Level: Ambulatory, Drives Self, Independent with Activities of Daily Living, Independent with Medication Management    Finances  Financial Barriers to Discharge: No  Prescription Coverage: Yes    Vision / Hearing Impairment  Vision Impairment : Yes  Right Eye Vision: Impaired, Wears  Glasses  Left Eye Vision: Impaired, Wears Glasses  Hearing Impairment : No    Advance Directive  Advance Directive?: Living Will, DPOA for Health Care  Durable Power of  Name and Contact : Afshin Agapito    Domestic Abuse  Have you ever been the victim of abuse or violence?: No  Possible Abuse/Neglect Reported to:: Not Applicable    Psychological Assessment  History of Substance Abuse: None  History of Psychiatric Problems: No  Non-compliant with Treatment: No  Newly Diagnosed Illness: No    Discharge Risks or Barriers  Discharge risks or barriers?: No    Anticipated Discharge Information  Discharge Disposition: Discharged to home/self care (01)  [address]charge [address] [place], [place]

## 2024-06-17 NOTE — CONSULTS
Pulmonary Consultation    Date of consult: 6/16/2024    Referring Physician  Charlie Casey M.D.    Reason for Consultation  Hypoxic respiratory failure    History of Presenting Illness  68 y.o. 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  He was admitted 4 days ago with lightheadedness and shortness of breath. He has been treated for 4 days with antibiotics and his shortness of breath has significantly improved today.  He was started on heparin infusion two days ago due to tachycardia and hypoxia.  His oxygen needs have varied from 1.5-3L.  He reports he feels it took too long to get him started on the antibiotics and it took him several days to improve but today he denies SHAW.  His tachycardia has been very mild and only with exertion.  VQ and CTA have been unable to be performed due to reported orthopnea and limitations due to his renal transplant.  He looks well on exam.  He had bilateral pleural effusions on admission CT which were still present on CXR on the 13th.  He denies any prior pulmonary conditions.       Code Status  Full Code    Review of Systems  Review of Systems   Constitutional:  Negative for chills, fever and malaise/fatigue.   Respiratory:  Positive for cough and shortness of breath.    Cardiovascular:  Positive for leg swelling. Negative for chest pain and palpitations.   Neurological:  Negative for dizziness and headaches.       Past Medical History   has a past medical history of A-fib (HCC), Arrhythmia, Benign essential hypertension, Diabetes (HCC), Heart burn, Hemorrhagic disorder (HCC), Hyperlipoproteinemia, Hypertension, Indigestion, Myocardial infarct (HCC) (2013), Polycystic kidney (09/10/2010), Presence of Watchman left atrial appendage closure device (02/29/2024), Sleep  apnea (01/30/2024), and Snoring.    He has no past medical history of Pain.    Surgical History   has a past surgical history that includes knee arthroplasty total (01/12/2007); knee arthroscopy (04/10/2006); other orthopedic surgery (07/08/1974); other (09/10/2010); knee arthroscopy (05/03/2011); meniscectomy, knee, medial (05/03/2011); knee unicompartmental (12/23/2011); knee arthroscopy (12/23/2011); knee manipulation (02/16/2012); and stent placement (2013).    Family History  family history is not on file.    Social History   reports that he has never smoked. He has never been exposed to tobacco smoke. He has never used smokeless tobacco. He reports that he does not drink alcohol and does not use drugs.    Medications  Home Medications       Reviewed by Emir Lomas (Pharmacy Tech) on 06/11/24 at 1423  Med List Status: Complete     Medication Last Dose Status   acarbose (PRECOSE) 50 MG Tab NO LONGER TAKIN Active   acetaminophen-codeine #3 (TYLENOL #3) 300-30 MG Tab 6/10/2024 Active   aspirin 81 MG EC tablet 6/10/2024 Active   atorvastatin (LIPITOR) 80 MG tablet 6/10/2024 Active   benzonatate (TESSALON) 200 MG capsule 6/11/2024 Active   fluticasone-salmeterol (WIXELA INHUB) 250-50 MCG/ACT AEROSOL POWDER, BREATH ACTIVATED 6/11/2024 Active   furosemide (LASIX) 40 MG Tab 6/11/2024 Active   gabapentin (NEURONTIN) 400 MG Cap 6/11/2024 Active   glyBURIDE (DIABETA) 5 MG Tab NO LONGER TAKING Active   insulin glargine 100 UNIT/ML Solution Pen-injector injection 6/10/2024 Active   Insulin Pen Needle 32 G x 4 mm N/A Active   levoFLOXacin (LEVAQUIN) 750 MG tablet 6/11/2024 Active   metFORMIN (GLUCOPHAGE) 500 MG Tab NO LONGER TAKING Active   metoprolol SR (TOPROL XL) 25 MG TABLET SR 24 HR 6/11/2024 Active   metroNIDAZOLE (FLAGYL) 500 MG Tab 2 WEEKS AGO Active   omeprazole (PRILOSEC) 20 MG delayed-release capsule 6/11/2024 Active   pioglitazone (ACTOS) 45 MG Tab NO LONGER TAKING Active   potassium chloride SA (K-DUR)  10 MEQ Tab CR 6/11/2024 Active   predniSONE (DELTASONE) 10 MG Tab 6/11/2024 Active   Sirolimus 2 MG Tab 6/11/2024 Active   TRADJENTA 5 MG Tab tablet NO LONGER TAKING Active                  Audit from Redirected Encounters    **Home medications have not yet been reviewed for this encounter**       Current Facility-Administered Medications   Medication Dose Route Frequency Provider Last Rate Last Admin    insulin GLARGINE (Lantus,Semglee) injection  16 Units Subcutaneous Q EVENING Prince MONIKA Simental D.O.        And    dextrose 10 % BOLUS 25 g  25 g Intravenous Q15 MIN PRN Prince MONIKA Simental D.O.        metoprolol SR (Toprol XL) tablet 25 mg  25 mg Oral BID JERRICA SavagePCOLETTE        heparin injection 5,000 Units  5,000 Units Subcutaneous Q8HRS Prince MONIKA Simental D.O.        insulin lispro (HumaLOG,AdmeLOG) injection  2-9 Units Subcutaneous 4X/DAY ACHS Prince MONIKA Simental D.O.   3 Units at 06/16/24 1322    levoFLOXacin (Levaquin) tablet 250 mg  250 mg Oral Q24HRS Tania Briggs M.D.   250 mg at 06/16/24 1505    guaiFENesin ER (Mucinex) tablet 600 mg  600 mg Oral Q12HRS Teresa Estrada M.D.   600 mg at 06/16/24 1727    furosemide (Lasix) tablet 40 mg  40 mg Oral BID DIURETIC Fish Wilder M.D.   40 mg at 06/16/24 1505    sodium chloride (Ocean) 0.65 % nasal spray 2 Spray  2 Spray Nasal Q2HRS PRN Maria Guadalupe Green D.O.   2 [place] at 06/13/24 0513    acetaminophen (Tylenol) tablet 650 mg  650 mg Oral Q6HRS PRN Prince MONIKA Simental D.O.   650 mg at 06/16/24 1149    Pharmacy Consult Request ...Pain Management Review 1 Each  1 Each Other PHARMACY TO DOSE Prince MONIKA Simental D.O.        oxyCODONE immediate-release (Roxicodone) tablet 2.5 mg  2.5 mg Oral Q3HRS PRN Teresa Estrada M.D.   2.5 mg at 06/16/24 0258    Or    oxyCODONE immediate-release (Roxicodone) tablet 5 mg  5 mg Oral Q3HRS PRN Teresa Estrada M.D.   5 mg at 06/14/24 2146    senna-docusate (Pericolace Or Senokot S) 8.6-50 MG per tablet 2 Tablet  2 Tablet Oral Q EVENING Prince MONIKA Simental D.O.         And    polyethylene glycol/lytes (Miralax) Packet 1 Packet  1 Packet Oral QDAY PRN Prince MONIKA Simental D.O.        atorvastatin (Lipitor) tablet 80 mg  80 mg Oral QHS Prince MONIKA Simental D.O.   80 mg at 06/15/24 1950    benzonatate (Tessalon) capsule 200 mg  200 mg Oral TID PRN Prince MONIKA Simental D.O.   200 mg at 06/13/24 0521    gabapentin (Neurontin) capsule 400 mg  400 mg Oral BID Prince MONIKA Simental D.O.   400 mg at 06/16/24 1727    omeprazole (PriLOSEC) capsule 20 mg  20 mg Oral DAILY NGOC LoboORudi   20 mg at 06/16/24 0502    predniSONE (Deltasone) tablet 20 mg  20 mg Oral DAILY NGOC LoboORudi   20 mg at 06/16/24 0503    sirolimus (Rapamune) tablet 4 mg  4 mg Oral DAILY Prince MONIKA Simental D.O.   4 mg at 06/16/24 0600    mometasone-formoterol (Dulera) 200-5 MCG/ACT inhaler 2 Puff  2 Puff Inhalation BID Charlie Casey M.D.   2 Puff at 06/16/24 1727    aspirin EC tablet 81 mg  81 mg Oral DAILY Prince MONIKA Simental D.O.   81 mg at 06/16/24 0503       Allergies  Allergies   Allergen Reactions    Doxycycline Rash     Sweats and shakes: 9/28/17: Clarified allergy with patient. Allergy was in 1998 and he doesn't remember what happened. He thought the medication is for pain.  Tolerates doxycycline 9/2017       Vital Signs last 24 hours  Temp:  [36.2 °C (97.2 °F)-36.6 °C (97.8 °F)] 36.2 °C (97.2 °F)  Pulse:  [64-95] 95  Resp:  [16-19] 18  BP: ()/(53-70) 96/61  SpO2:  [91 %-99 %] 93 %    Physical Exam  Physical Exam  HENT:      Head: Atraumatic.   Eyes:      Extraocular Movements: Extraocular movements intact.   Cardiovascular:      Rate and Rhythm: Normal rate and regular rhythm.   Pulmonary:      Effort: Pulmonary effort is normal. No respiratory distress.      Breath sounds: Normal breath sounds. No wheezing or rales.   Musculoskeletal:         General: No swelling.   Skin:     General: Skin is warm and dry.   Neurological:      General: No focal deficit present.      Mental Status: He is alert and oriented to person, place, and time.          Fluids    Intake/Output Summary (Last 24 hours) at 6/16/2024 1813  Last data filed at 6/16/2024 1600  Gross per 24 hour   Intake 1280 ml   Output 800 ml   Net 480 ml       Laboratory  Recent Results (from the past 48 hour(s))   POCT glucose device results    Collection Time: 06/14/24  7:42 PM   Result Value Ref Range    POC Glucose, Blood 342 (H) 65 - 99 mg/dL   Comp Metabolic Panel    Collection Time: 06/14/24 11:45 PM   Result Value Ref Range    Sodium 139 135 - 145 mmol/L    Potassium 4.7 3.6 - 5.5 mmol/L    Chloride 100 96 - 112 mmol/L    Co2 25 20 - 33 mmol/L    Anion Gap 14.0 7.0 - 16.0    Glucose 293 (H) 65 - 99 mg/dL    Bun 71 (H) 8 - 22 mg/dL    Creatinine 2.45 (H) 0.50 - 1.40 mg/dL    Calcium 9.1 8.5 - 10.5 mg/dL    Correct Calcium 9.9 8.5 - 10.5 mg/dL    AST(SGOT) 19 12 - 45 U/L    ALT(SGPT) 17 2 - 50 U/L    Alkaline Phosphatase 76 30 - 99 U/L    Total Bilirubin 0.4 0.1 - 1.5 mg/dL    Albumin 3.0 (L) 3.2 - 4.9 g/dL    Total Protein 5.7 (L) 6.0 - 8.2 g/dL    Globulin 2.7 1.9 - 3.5 g/dL    A-G Ratio 1.1 g/dL   Heparin Xa (Unfractionated)    Collection Time: 06/14/24 11:45 PM   Result Value Ref Range    Heparin Xa (UFH) >1.10 (HH) IU/mL   ESTIMATED GFR    Collection Time: 06/14/24 11:45 PM   Result Value Ref Range    GFR (CKD-EPI) 28 (A) >60 mL/min/1.73 m 2   Heparin Xa (Unfractionated)    Collection Time: 06/15/24  8:11 AM   Result Value Ref Range    Heparin Xa (UFH) >1.10 (HH) IU/mL   CBC WITH DIFFERENTIAL    Collection Time: 06/15/24  8:11 AM   Result Value Ref Range    WBC 9.2 4.8 - 10.8 K/uL    RBC 4.97 4.70 - 6.10 M/uL    Hemoglobin 12.6 (L) 14.0 - 18.0 g/dL    Hematocrit 41.8 (L) 42.0 - 52.0 %    MCV 84.1 81.4 - 97.8 fL    MCH 25.4 (L) 27.0 - 33.0 pg    MCHC 30.1 (L) 32.3 - 36.5 g/dL    RDW 50.2 (H) 35.9 - 50.0 fL    Platelet Count 64 (L) 164 - 446 K/uL    Neutrophils-Polys 88.30 (H) 44.00 - 72.00 %    Lymphocytes 2.20 (L) 22.00 - 41.00 %    Monocytes 7.40 0.00 - 13.40 %    Eosinophils 0.20  0.00 - 6.90 %    Basophils 0.30 0.00 - 1.80 %    Immature Granulocytes 1.60 (H) 0.00 - 0.90 %    Nucleated RBC 0.00 0.00 - 0.20 /100 WBC    Neutrophils (Absolute) 8.10 (H) 1.82 - 7.42 K/uL    Lymphs (Absolute) 0.20 (L) 1.00 - 4.80 K/uL    Monos (Absolute) 0.68 0.00 - 0.85 K/uL    Eos (Absolute) 0.02 0.00 - 0.51 K/uL    Baso (Absolute) 0.03 0.00 - 0.12 K/uL    Immature Granulocytes (abs) 0.15 (H) 0.00 - 0.11 K/uL    NRBC (Absolute) 0.00 K/uL   LDH    Collection Time: 06/15/24  8:11 AM   Result Value Ref Range    LDH Total 321 (H) 107 - 266 U/L   IMMATURE PLT FRACTION    Collection Time: 06/15/24  8:11 AM   Result Value Ref Range    Imm. Plt Fraction 16.3 (H) 0.6 - 13.1 %   proBrain Natriuretic Peptide, NT    Collection Time: 06/15/24  8:11 AM   Result Value Ref Range    NT-proBNP 7419 (H) 0 - 125 pg/mL   POCT glucose device results    Collection Time: 06/15/24  9:47 AM   Result Value Ref Range    POC Glucose, Blood 207 (H) 65 - 99 mg/dL   POCT glucose device results    Collection Time: 06/15/24  2:06 PM   Result Value Ref Range    POC Glucose, Blood 191 (H) 65 - 99 mg/dL   Heparin Xa (Unfractionated)    Collection Time: 06/15/24  4:09 PM   Result Value Ref Range    Heparin Xa (UFH) >1.10 (HH) IU/mL   POCT glucose device results    Collection Time: 06/15/24  7:41 PM   Result Value Ref Range    POC Glucose, Blood 202 (H) 65 - 99 mg/dL   Heparin Anti-Xa    Collection Time: 06/16/24  1:04 AM   Result Value Ref Range    Heparin Xa (UFH) 0.84 IU/mL   PROCALCITONIN    Collection Time: 06/16/24  7:08 AM   Result Value Ref Range    Procalcitonin 0.20 <0.25 ng/mL   CBC WITH DIFFERENTIAL    Collection Time: 06/16/24  7:08 AM   Result Value Ref Range    WBC 10.1 4.8 - 10.8 K/uL    RBC 4.93 4.70 - 6.10 M/uL    Hemoglobin 12.6 (L) 14.0 - 18.0 g/dL    Hematocrit 41.5 (L) 42.0 - 52.0 %    MCV 84.2 81.4 - 97.8 fL    MCH 25.6 (L) 27.0 - 33.0 pg    MCHC 30.4 (L) 32.3 - 36.5 g/dL    RDW 50.7 (H) 35.9 - 50.0 fL    Platelet Count 65 (L)  164 - 446 K/uL    Neutrophils-Polys 84.70 (H) 44.00 - 72.00 %    Lymphocytes 6.70 (L) 22.00 - 41.00 %    Monocytes 6.50 0.00 - 13.40 %    Eosinophils 0.30 0.00 - 6.90 %    Basophils 0.20 0.00 - 1.80 %    Immature Granulocytes 1.60 (H) 0.00 - 0.90 %    Nucleated RBC 0.30 (H) 0.00 - 0.20 /100 WBC    Neutrophils (Absolute) 8.54 (H) 1.82 - 7.42 K/uL    Lymphs (Absolute) 0.68 (L) 1.00 - 4.80 K/uL    Monos (Absolute) 0.66 0.00 - 0.85 K/uL    Eos (Absolute) 0.03 0.00 - 0.51 K/uL    Baso (Absolute) 0.02 0.00 - 0.12 K/uL    Immature Granulocytes (abs) 0.16 (H) 0.00 - 0.11 K/uL    NRBC (Absolute) 0.03 K/uL   Comp Metabolic Panel    Collection Time: 06/16/24  7:08 AM   Result Value Ref Range    Sodium 137 135 - 145 mmol/L    Potassium 5.0 3.6 - 5.5 mmol/L    Chloride 99 96 - 112 mmol/L    Co2 20 20 - 33 mmol/L    Anion Gap 18.0 (H) 7.0 - 16.0    Glucose 211 (H) 65 - 99 mg/dL    Bun 67 (H) 8 - 22 mg/dL    Creatinine 2.13 (H) 0.50 - 1.40 mg/dL    Calcium 9.1 8.5 - 10.5 mg/dL    Correct Calcium 9.8 8.5 - 10.5 mg/dL    AST(SGOT) 26 12 - 45 U/L    ALT(SGPT) 21 2 - 50 U/L    Alkaline Phosphatase 66 30 - 99 U/L    Total Bilirubin 0.4 0.1 - 1.5 mg/dL    Albumin 3.1 (L) 3.2 - 4.9 g/dL    Total Protein 5.7 (L) 6.0 - 8.2 g/dL    Globulin 2.6 1.9 - 3.5 g/dL    A-G Ratio 1.2 g/dL   MAGNESIUM    Collection Time: 06/16/24  7:08 AM   Result Value Ref Range    Magnesium 2.1 1.5 - 2.5 mg/dL   PHOSPHORUS    Collection Time: 06/16/24  7:08 AM   Result Value Ref Range    Phosphorus 3.6 2.5 - 4.5 mg/dL   Heparin Anti-Xa    Collection Time: 06/16/24  7:08 AM   Result Value Ref Range    Heparin Xa (UFH) 0.51 IU/mL   ESTIMATED GFR    Collection Time: 06/16/24  7:08 AM   Result Value Ref Range    GFR (CKD-EPI) 33 (A) >60 mL/min/1.73 m 2   IMMATURE PLT FRACTION    Collection Time: 06/16/24  7:08 AM   Result Value Ref Range    Imm. Plt Fraction 14.9 (H) 0.6 - 13.1 %   POCT glucose device results    Collection Time: 06/16/24  9:44 AM   Result Value Ref  Range    POC Glucose, Blood 287 (H) 65 - 99 mg/dL   POCT glucose device results    Collection Time: 06/16/24  1:21 PM   Result Value Ref Range    POC Glucose, Blood 246 (H) 65 - 99 mg/dL       Imaging  DX-CHEST-2 VIEWS   Final Result      1.  Stable cardiomegaly and interstitial edema.      2.  Stable bibasilar atelectasis/consolidation and bilateral pleural effusions.      DX-CHEST-PORTABLE (1 VIEW)   Final Result         1.  Pulmonary edema and/or infiltrates, increased since prior study.   2.  Moderate bilateral layering pleural effusions   3.  Cardiomegaly   4.  Atherosclerosis      DX-CHEST-2 VIEWS   Final Result      Small effusions with bibasilar atelectasis/infiltrate. There is also mildly increased right upper lobe infiltrate.      DX-CHEST-PORTABLE (1 VIEW)   Final Result      Hypoinflation with bibasilar atelectasis/pneumonitis and trace/small pleural effusions. No significant change.          Assessment/Plan  #Hypoxic respiratory failure  #Reported pneumonia although no consolidation on imaging  #History of DLBCL and renal transplants  Plan:  - Complete 5-7 days of CAP coverage, if symptoms improve as they are now, no need for further evaluation  - Should he fail to improve after a full course of abx then a bronchoscopy would be appropriate, however today he is telling me he feels significantly improved so this is unlikely to be the case  - His symptoms are those of a CHF exacerbation with orthopnea and fluid overload and he should be diuresed completely  - HOB elevation  - Aspiration precautions  - Titrate oxygen to an SpO2 of 88-94%  - Airway clearance as needed  - Patient's course is not consistent with that of PE.  I would stop AC.  - Pulmonary to sign off, Please do not hesitate to reconsult if further questions arise.    Eva Taylor MD RD  Pulmonary and Critical Care    Available on Voalte

## 2024-06-17 NOTE — CARE PLAN
The patient is Stable - Low risk of patient condition declining or worsening    Shift Goals  Clinical Goals: wound care, pain management, Oral ABX. respiratory status  Patient Goals: Comfort, rest  Family Goals: JUDITH    Progress made toward(s) clinical / shift goals:        Problem: Pain - Standard  Goal: Alleviation of pain or a reduction in pain to the patient’s comfort goal  Outcome: Progressing  Note: Patient reported left axilla pain. Tylenol given, somewhat effective and Oxycodone Prn given at HS, able to sleep comfortably.      Problem: Respiratory  Goal: Patient will achieve/maintain optimum respiratory ventilation and gas exchange  Outcome: Progressing  Note: Patient maintains 88-92 % oxygen sats at 2-2.5 LPM via nasal cannula.        Patient is not progressing towards the following goals:

## 2024-06-17 NOTE — PROGRESS NOTES
Telemetry Report:      Rhythm:     Afib   Heart Rate:   Ectopy:     Frequent PVC, frequent bigemy, occasional coup       SD:  -          QRS:       0.13  QT:   -        Per Telestrip from Monitor Room

## 2024-06-17 NOTE — PROGRESS NOTES
"[facility] -  PROGRESS NOTE               Author: Raphael Hua M.D. Date & Time: 6/17/2024  10:42 AM     HPI:  Patient is a 68 year old male with a PMHx of afib, CAD, renal transplant in 2010, PKD, HLD, HTN, and squamous cell carcinoma of the left axillary region on immunotherapy via Dr. Fajardo, DM with last A1c of 6.9%, who presented to hospital for SOB from outpatient nephrology clinic.  Pt also has orthopnea and significant lower extremity edema.  He is currently on sirolimus and pred for his transplant. Scr from 6/5 was 2.89 and his baseline is around 1.5-1.9. Nephrology was asked to consult for SANKET. Scr in the ER was 2.56. He states his SOB has been worsening over the past 3 weeks.      No F/C/N/V/CP/He does have SOB.  No melena, hematochezia, hematemesis.  No HA, visual changes, or abdominal pain. + edema bilateral LE    DAILY NEPHROLOGY SUMMARY:  6/12 - No new overnight renal events. Scr is 2.47 today. Diuresing well.   6/13 - Scr is stable. Good UOP. Feels better.   6/14 - Scr continues to head in the right direction. 2.34. HR 88-96 HR and then spikes to 160+ when standing or ambulating.    6/15 - No new overnight renal events. Afib , up to 160-170 when ambulating or standing. Scr is holding steady. Still SOB. Bilateral LE has improved significantly.  6/16 - No new overnight renal events. Diuresing well. Scr is down to 2.13.   6/17- No new labs,remains in AF, no c/o, denies feeling dizzy when he stands up     REVIEW OF SYSTEMS:    10 point ROS reviewed and is as per HPI/daily summary or otherwise negative    PMH/PSH/SH/FH:   Reviewed and unchanged since admission note    CURRENT MEDICATIONS:   Reviewed from admission to present day    VS:  BP 98/62   Pulse 84   Temp 36.2 °C (97.2 °F) (Temporal)   Resp 18   Ht 1.981 m (6' 6\")   Wt 97.7 kg (215 lb 6.2 oz)   SpO2 97%   BMI 24.89 kg/m²     Physical Exam  Vitals and nursing note reviewed.     Constitutional:       " General: He is not in acute distress.     Appearance:  He is not ill-appearing.   HENT:      Head: Normocephalic and atraumatic.      Nose: Nose normal.      Mouth/Throat:      Mouth: Mucous membranes are moist.      Pharynx: Oropharynx is clear.   Eyes:      Extraocular Movements: Extraocular movements intact.      Conjunctiva/sclera: Conjunctivae normal.      Pupils: Pupils are equal, round, and reactive to light.   Cardiovascular:      Rate and Rhythm: tachycardia   Pulmonary:      Effort: No respiratory distress.      Breath sounds: minimal tanya crackles present.   Abdominal:      General: Abdomen is flat. Bowel sounds are normal.      Palpations: Abdomen is soft.   Musculoskeletal:      Cervical back: Normal range of motion and neck supple.      Right lower leg: Edema present. Improved - has compression stockings     Left lower leg: Edema present, improved  Neurological:      General: No focal deficit present.      Mental Status: He is alert and oriented to person, place, and time. Mental status is at baseline.   Psychiatric:         Mood and Affect: Mood normal.         Behavior: Behavior normal.         Thought Content: Thought content normal.         Judgment: Judgment normal.     Fluids:  In: 1160 [P.O.:1160]  Out: 800     LABS:  Recent Labs     06/14/24  2345 06/16/24  0708   SODIUM 139 137   POTASSIUM 4.7 5.0   CHLORIDE 100 99   CO2 25 20   GLUCOSE 293* 211*   BUN 71* 67*   CREATININE 2.45* 2.13*   CALCIUM 9.1 9.1       IMAGING:   All imaging reviewed from admission to present day    IMPRESSION:  # SANKET    - Improved since last Scr of 2.89 from 6/5/24. Baseline Scr is ~1.9  # Renal transplant    - Continue with home IS medications (Pred and Sirolimus)  # h/o ADPKD  # Mild hyperkalemia,improved   # Acute pulmonary edema, HFrEF 45%  # PNA   # Acidosis  # Chronic Immunosuppression on medications (sirolimus and prednisone)  # Type 2 DM - last A1c was 6.9%  # Proteinuria  # Atrial fibrillation  # Vit d  deficiency  # Squamous Cell Ca - getting immunotherapy/followed by Dr. Fajardo. He has mets to lymph nodes., chronic lymphdema of LUE    # Anemia      PLAN:  - There is no acute need for RRT.   - Creatinine is improving. He does not want dialysis if it comes to it.   - Continue PO furosemide BID  - Low salt diet  - Continue with home IS medications  - Dose all meds per decreased renal function   - Avoid nephrotoxins   - Labs pending   - Rest of management per primary team

## 2024-06-18 ENCOUNTER — APPOINTMENT (OUTPATIENT)
Dept: RADIOLOGY | Facility: MEDICAL CENTER | Age: 69
DRG: 291 | End: 2024-06-18
Attending: INTERNAL MEDICINE
Payer: MEDICARE

## 2024-06-18 LAB
ALBUMIN SERPL BCP-MCNC: 3 G/DL (ref 3.2–4.9)
ALBUMIN/GLOB SERPL: 1.3 G/DL
ALP SERPL-CCNC: 64 U/L (ref 30–99)
ALT SERPL-CCNC: 17 U/L (ref 2–50)
ANION GAP SERPL CALC-SCNC: 12 MMOL/L (ref 7–16)
AST SERPL-CCNC: 18 U/L (ref 12–45)
BILIRUB SERPL-MCNC: 0.4 MG/DL (ref 0.1–1.5)
BUN SERPL-MCNC: 77 MG/DL (ref 8–22)
CALCIUM ALBUM COR SERPL-MCNC: 9.7 MG/DL (ref 8.5–10.5)
CALCIUM SERPL-MCNC: 8.9 MG/DL (ref 8.5–10.5)
CHLORIDE SERPL-SCNC: 98 MMOL/L (ref 96–112)
CO2 SERPL-SCNC: 26 MMOL/L (ref 20–33)
CREAT SERPL-MCNC: 2.58 MG/DL (ref 0.5–1.4)
GFR SERPLBLD CREATININE-BSD FMLA CKD-EPI: 26 ML/MIN/1.73 M 2
GLOBULIN SER CALC-MCNC: 2.4 G/DL (ref 1.9–3.5)
GLUCOSE BLD STRIP.AUTO-MCNC: 121 MG/DL (ref 65–99)
GLUCOSE BLD STRIP.AUTO-MCNC: 212 MG/DL (ref 65–99)
GLUCOSE BLD STRIP.AUTO-MCNC: 224 MG/DL (ref 65–99)
GLUCOSE BLD STRIP.AUTO-MCNC: 231 MG/DL (ref 65–99)
GLUCOSE SERPL-MCNC: 183 MG/DL (ref 65–99)
MAGNESIUM SERPL-MCNC: 2.1 MG/DL (ref 1.5–2.5)
PHOSPHATE SERPL-MCNC: 3 MG/DL (ref 2.5–4.5)
POTASSIUM SERPL-SCNC: 4.9 MMOL/L (ref 3.6–5.5)
PROT SERPL-MCNC: 5.4 G/DL (ref 6–8.2)
SODIUM SERPL-SCNC: 136 MMOL/L (ref 135–145)

## 2024-06-18 PROCEDURE — 700102 HCHG RX REV CODE 250 W/ 637 OVERRIDE(OP)

## 2024-06-18 PROCEDURE — 82962 GLUCOSE BLOOD TEST: CPT

## 2024-06-18 PROCEDURE — 80053 COMPREHEN METABOLIC PANEL: CPT

## 2024-06-18 PROCEDURE — 770020 HCHG ROOM/CARE - TELE (206)

## 2024-06-18 PROCEDURE — 83735 ASSAY OF MAGNESIUM: CPT

## 2024-06-18 PROCEDURE — 36415 COLL VENOUS BLD VENIPUNCTURE: CPT

## 2024-06-18 PROCEDURE — A9270 NON-COVERED ITEM OR SERVICE: HCPCS

## 2024-06-18 PROCEDURE — 99232 SBSQ HOSP IP/OBS MODERATE 35: CPT | Mod: GC | Performed by: INTERNAL MEDICINE

## 2024-06-18 PROCEDURE — 71045 X-RAY EXAM CHEST 1 VIEW: CPT

## 2024-06-18 PROCEDURE — 700111 HCHG RX REV CODE 636 W/ 250 OVERRIDE (IP)

## 2024-06-18 PROCEDURE — 700102 HCHG RX REV CODE 250 W/ 637 OVERRIDE(OP): Performed by: INTERNAL MEDICINE

## 2024-06-18 PROCEDURE — A9270 NON-COVERED ITEM OR SERVICE: HCPCS | Performed by: INTERNAL MEDICINE

## 2024-06-18 PROCEDURE — 84100 ASSAY OF PHOSPHORUS: CPT

## 2024-06-18 RX ORDER — GABAPENTIN 300 MG/1
300 CAPSULE ORAL EVERY EVENING
Status: DISCONTINUED | OUTPATIENT
Start: 2024-06-19 | End: 2024-06-19

## 2024-06-18 RX ORDER — METOPROLOL SUCCINATE 25 MG/1
75 TABLET, EXTENDED RELEASE ORAL 2 TIMES DAILY
Status: DISCONTINUED | OUTPATIENT
Start: 2024-06-18 | End: 2024-06-19

## 2024-06-18 RX ORDER — FUROSEMIDE 40 MG/1
40 TABLET ORAL
Status: DISCONTINUED | OUTPATIENT
Start: 2024-06-19 | End: 2024-06-19

## 2024-06-18 RX ORDER — METOPROLOL SUCCINATE 25 MG/1
50 TABLET, EXTENDED RELEASE ORAL 2 TIMES DAILY
Status: DISCONTINUED | OUTPATIENT
Start: 2024-06-18 | End: 2024-06-18

## 2024-06-18 RX ADMIN — PREDNISONE 20 MG: 20 TABLET ORAL at 06:33

## 2024-06-18 RX ADMIN — GUAIFENESIN 600 MG: 600 TABLET, EXTENDED RELEASE ORAL at 17:22

## 2024-06-18 RX ADMIN — METOPROLOL SUCCINATE 37.5 MG: 25 TABLET, EXTENDED RELEASE ORAL at 06:33

## 2024-06-18 RX ADMIN — ATORVASTATIN CALCIUM 80 MG: 80 TABLET, FILM COATED ORAL at 19:50

## 2024-06-18 RX ADMIN — MOMETASONE FUROATE AND FORMOTEROL FUMARATE DIHYDRATE 2 PUFF: 200; 5 AEROSOL RESPIRATORY (INHALATION) at 06:33

## 2024-06-18 RX ADMIN — INSULIN LISPRO 3 UNITS: 100 INJECTION, SOLUTION INTRAVENOUS; SUBCUTANEOUS at 14:25

## 2024-06-18 RX ADMIN — ACETAMINOPHEN 650 MG: 325 TABLET, FILM COATED ORAL at 08:49

## 2024-06-18 RX ADMIN — OMEPRAZOLE 20 MG: 20 CAPSULE, DELAYED RELEASE ORAL at 06:33

## 2024-06-18 RX ADMIN — FUROSEMIDE 40 MG: 40 TABLET ORAL at 06:33

## 2024-06-18 RX ADMIN — METOPROLOL TARTRATE 12.5 MG: 25 TABLET, FILM COATED ORAL at 11:46

## 2024-06-18 RX ADMIN — INSULIN LISPRO 3 UNITS: 100 INJECTION, SOLUTION INTRAVENOUS; SUBCUTANEOUS at 19:45

## 2024-06-18 RX ADMIN — METOPROLOL SUCCINATE 75 MG: 25 TABLET, EXTENDED RELEASE ORAL at 17:22

## 2024-06-18 RX ADMIN — GABAPENTIN 400 MG: 400 CAPSULE ORAL at 17:22

## 2024-06-18 RX ADMIN — MOMETASONE FUROATE AND FORMOTEROL FUMARATE DIHYDRATE 2 PUFF: 200; 5 AEROSOL RESPIRATORY (INHALATION) at 19:46

## 2024-06-18 RX ADMIN — SIROLIMUS 4 MG: 0.5 TABLET ORAL at 06:33

## 2024-06-18 RX ADMIN — ACETAMINOPHEN 650 MG: 325 TABLET, FILM COATED ORAL at 14:21

## 2024-06-18 RX ADMIN — GABAPENTIN 400 MG: 400 CAPSULE ORAL at 06:33

## 2024-06-18 RX ADMIN — GUAIFENESIN 600 MG: 600 TABLET, EXTENDED RELEASE ORAL at 06:32

## 2024-06-18 RX ADMIN — LEVOFLOXACIN 250 MG: 500 TABLET, FILM COATED ORAL at 08:49

## 2024-06-18 RX ADMIN — ASPIRIN 81 MG: 81 TABLET, COATED ORAL at 06:32

## 2024-06-18 ASSESSMENT — PAIN DESCRIPTION - PAIN TYPE
TYPE: ACUTE PAIN
TYPE: ACUTE PAIN;CHRONIC PAIN
TYPE: ACUTE PAIN;CHRONIC PAIN
TYPE: ACUTE PAIN
TYPE: ACUTE PAIN;CHRONIC PAIN;DEEP SOMATIC PAIN
TYPE: ACUTE PAIN;CHRONIC PAIN

## 2024-06-18 ASSESSMENT — ENCOUNTER SYMPTOMS
SHORTNESS OF BREATH: 1
SPUTUM PRODUCTION: 1
DIZZINESS: 1
COUGH: 1
NAUSEA: 0
WHEEZING: 0
ORTHOPNEA: 1
FEVER: 0
CHILLS: 0
HEMOPTYSIS: 0
VOMITING: 0

## 2024-06-18 ASSESSMENT — FIBROSIS 4 INDEX: FIB4 SCORE: 4.5

## 2024-06-18 NOTE — CARE PLAN
The patient is Stable - Low risk of patient condition declining or worsening    Shift Goals  Clinical Goals: Wound care, walk test, pain control  Patient Goals: discharge, wound care  Family Goals: JUDITH    Progress made toward(s) clinical / shift goals:        Problem: Pain - Standard  Goal: Alleviation of pain or a reduction in pain to the patient’s comfort goal  Outcome: Progressing  Flowsheets (Taken 6/18/2024 1634)  Non Verbal Scale:   Calm   Restlessness  OB Pain Intervention:   Medication - See MAR   Relaxation technique   Repositioned   Rest  Pain Rating Scale (NPRS): 7  Note: 1. Document pain using the appropriate pain scale per order or unit policy 2. Educate and implement non-pharmacologic comfort measures (i.e. relaxation, distraction, massage, cold/heat therapy, etc.) 3. Pain management medications as ordered 4. Reassess pain after pain med administration per policy 5. If opiods administered assess patient's response to pain medication is appropriate per POSS sedation scale 6. Follow pain management plan developed in collaboration with patient and interdisciplinary team (including pall      Problem: Respiratory  Goal: Patient will achieve/maintain optimum respiratory ventilation and gas exchange  Outcome: Progressing  Flowsheets (Taken 6/18/2024 1634)  O2 Delivery Device: Silicone Nasal Cannula  Incentive Spirometer: Effective  Deep Breathe and Cough: Performs Correctly  Note: 1. Assess and monitor rate, rhythm, depth and effort of respiration 2. Breath sounds assessed qshift and/or as needed 3. Assess O2 saturation, administer/titrate oxygen as ordered 4. Position patient for maximum ventilatory efficiency 5. Turn, cough, and deep breath with splinting to improve effectiveness 6. Collaborate with RT to administer medication/treatments per order 7. Encourage use of incentive spirometer and encourage patient to cough after use and utilize splinting techniques if applicable 8. Airway suctioning 9. Monitor  sputum production for changes in color, consistency and frequency 10. Perform frequent oral hygiene 11. Alternate physical activity with rest periods          Patient is not progressing towards the following goals:  NA

## 2024-06-18 NOTE — PROGRESS NOTES
Performed walk test per order    HR sustained 170s, maxed at 180 BPM (Afib rhythm)    Pt required >6L of oxygen to maintain SpO2 of 85 percent.  Returned to room.    Pt reported generalized weakness and fatigue with SOB    Denied chest pain.  Pt recovered after approx 5 minutes of rest, titrated oxygen to 2.5L at rest. HR  93 BPM.       Team notified, chest xray ordered.

## 2024-06-18 NOTE — PROGRESS NOTES
[facility] Internal Medicine Daily Progress Note    Date of Service  6/18/2024    R Team: R IM Green Team   Attending: Charlie Casey M.d.  Senior Resident: Dr. Betsy Muñoz  Intern:  Dr. Eliud Oleary  Contact Number: [phone]    Chief Complaint  Jama Altman is a 68 y.o. male admitted 6/11/2024 with leg swelling and dizziness    Hospital Course  Jama Altman is a 68 y.o. 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 CAD s/p stent, renal transplant 2010 on chronic immunosuppressant therapy, (hx of polycystic kidney disease), HLD, HTN and PAD who was admitted due to hypervolemia in the setting of acute on chronic kidney injury and acute hypoxic failure.    At ED,  saturating 94% at 2 L of oxygen but afebrile.  CMP was remarkable for elevated BUN of 80 and elevated creatinine of 2.59.  Chest x-ray presented hypoinflation with bibasilar atelectasis/pneumonitis and small pleural effusion without significant changes compared to 5/28/24 chest x-ray.  At ED, patient received IV Lasix of 60 mg.   Nephrology has been following and recommended diuresing.  Has been treating with antibiotics for possible pneumonia.  D-dimer was elevated. With acute kidney injury and history of Kidney transplant, V/Q scan was ordered but patient could not lie down flat more than 15 mins so V/Q scan got canceled. Patient agreed to start heparin to treat possible pulmonary embolism after discussion benefit and risk of heparin including bleeding risk. Cardiologist consulted for atrial fibrillation with RVR on exertion. Infectious disease was consulted and recommended 7 days of levofloxacin. Pulm was consulted, recommended discontinuing heparin (no concern for PE), recommended continuing Abx and diuresing. CT thorax w/o if his symptom is not improving.        Interval Problem Update  -  No overnight acute distress, vitals are stable  - Patient feels heart rate is improved after increase the dose of metoprolol, however this morning during the walking test patient still cannot tolerate, heart rate increased to sustained 170s, and needed 6 L to maintain SpO2 around 84%. with increased shortness of breath.  Repeat x-ray shows small bilateral pleural effusions. We have increased the metoprolol dose to 75 mg twice daily, per cardiology recommendation his metoprolol dose could be increased up to 200 mg daily.   - Increase the glargine to 23 units yesterday afternoon, fasting glucose this morning 121, will keep monitoring      I have discussed this patient's plan of care and discharge plan at IDT rounds today with Case Management, Nursing, Nursing leadership, and other members of the IDT team.    Consultants/Specialty  nephrology- Dr. Wilder   Cardiology  Infectious disease  Pulmonology     Code Status  Full Code    Disposition  The patient is not medically cleared for discharge to home or a post-acute facility.      Review of Systems  Review of Systems   Constitutional:  Negative for chills and fever.   Respiratory:  Positive for cough, sputum production and shortness of breath. Negative for hemoptysis and wheezing.         Coughing and SOB has been improving   Cardiovascular:  Positive for orthopnea. Negative for chest pain and leg swelling.        Leg swelling improving   Gastrointestinal:  Negative for nausea and vomiting.   Neurological:  Positive for dizziness.        Dizziness when he walks        Physical Exam  Temp:  [36.2 °C (97.2 °F)-36.5 °C (97.7 °F)] 36.5 °C (97.7 °F)  Pulse:  [85-96] 85  Resp:  [16-18] 18  BP: ()/(58-67) 102/66  SpO2:  [92 %-97 %] 94 %    Physical Exam  Vitals reviewed.   Constitutional:       General: He is not in acute distress.     Appearance: He is not ill-appearing, toxic-appearing or diaphoretic.   HENT:      Head: Normocephalic and atraumatic.      Mouth/Throat:       Mouth: Mucous membranes are moist.      Pharynx: Oropharynx is clear. No oropharyngeal exudate.   Eyes:      Extraocular Movements: Extraocular movements intact.      Pupils: Pupils are equal, round, and reactive to light.   Cardiovascular:      Rate and Rhythm: Normal rate. Rhythm irregular.      Heart sounds: No murmur heard.  Pulmonary:      Effort: Pulmonary effort is normal. No respiratory distress.   Abdominal:      General: Abdomen is flat. There is no distension.      Palpations: Abdomen is soft.      Tenderness: There is no abdominal tenderness. There is no guarding or rebound.   Musculoskeletal:         General: Normal range of motion.      Cervical back: Normal range of motion. No rigidity.      Right lower leg: No edema.      Left lower leg: No edema.      Comments: Swelling on left upper extremity, patient stated its chronic   Skin:     General: Skin is warm and dry.      Coloration: Skin is not jaundiced.      Findings: Lesion present.      Comments: lesion dressing applied on left axillary region  Metastatic cancer with necrosis   Neurological:      General: No focal deficit present.      Mental Status: He is alert and oriented to person, place, and time.      Cranial Nerves: No cranial nerve deficit.   Psychiatric:         Mood and Affect: Mood normal.         Behavior: Behavior normal.         Fluids    Intake/Output Summary (Last 24 hours) at 6/18/2024 1337  Last data filed at 6/18/2024 0800  Gross per 24 hour   Intake 840 ml   Output 1700 ml   Net -860 ml       Laboratory  Recent Labs     06/16/24  0708   WBC 10.1   RBC 4.93   HEMOGLOBIN 12.6*   HEMATOCRIT 41.5*   MCV 84.2   MCH 25.6*   MCHC 30.4*   RDW 50.7*   PLATELETCT 65*     Recent Labs     06/16/24  0708 06/17/24  0952 06/18/24  0223   SODIUM 137 132* 136   POTASSIUM 5.0 5.3 4.9   CHLORIDE 99 96 98   CO2 20 23 26   GLUCOSE 211* 318* 183*   BUN 67* 74* 77*   CREATININE 2.13* 2.54* 2.58*   CALCIUM 9.1 8.9 8.9                      Imaging  DX-CHEST-PORTABLE (1 VIEW)   Final Result         Small bilateral pleural effusions, left more than right, with bibasilar atelectasis.      DX-CHEST-2 VIEWS   Final Result      1.  Stable cardiomegaly and interstitial edema.      2.  Stable bibasilar atelectasis/consolidation and bilateral pleural effusions.      DX-CHEST-PORTABLE (1 VIEW)   Final Result         1.  Pulmonary edema and/or infiltrates, increased since prior study.   2.  Moderate bilateral layering pleural effusions   3.  Cardiomegaly   4.  Atherosclerosis      DX-CHEST-2 VIEWS   Final Result      Small effusions with bibasilar atelectasis/infiltrate. There is also mildly increased right upper lobe infiltrate.      DX-CHEST-PORTABLE (1 VIEW)   Final Result      Hypoinflation with bibasilar atelectasis/pneumonitis and trace/small pleural effusions. No significant change.            Assessment/Plan  Problem Representation:    * Acute respiratory failure with hypoxia (HCC)- (present on admission)  Assessment & Plan  Patient has been endorsing shortness of breath and orthopnea.  Hypervolemic on assessment.  Requiring 2 L oxygen at ED.  Chest x-ray presented hypoinflation with bibasilar atelectasis/pneumonitis and small pleural effusion without significant changes compared to 5/28/24 chest x-ray. 2 view CXR with RUL infiltrate, procal elevated 0.4 on admission   Likely due to fluid overload from acute kidney injury, possible pneumonia (high risk due to immunosuppression), less likely acute on chronic heart failure. MRSA nares positive March    (6/13/24) increased oxygen requirement overnight.  Two-view chest x-ray was unremarkable compared to yesterday.  Repeat procalcitonin trending down to 0.34.   (6/14/24) Pro-Randy trending down 0.25.  D-dimer was elevated.  Discussed risk and benefit to start heparin to treat possible pulmonary embolism, patient agreed.  Started heparin  (6/15/24) still require 1.5 L of oxygen same as yesterday.   Discontinued Metolazone. ID switched to Levofloxacin  (6/16/24) Went up to 2L of O2. Pulm was consulted, recommended discontinuing heparin (no concern for PE), recommended continuing Abx and diuresing. CT thorax w/o if his symptom is not improving.  Heparin discontinued  Heparin 6/14 - 16  (6/17/24) Patient feels improvement.   (6/18/24) Patient continue to feel improvement, but BUN increase, per nephrologist Lasix decreased to one dose per day.  Patient is not on obvious fluid overload status.  -Transition to oral Lasix 40 mg twice daily starting from 6/13/2024, decreased to 40 mg daily on 6/18/24  -Levofloxacin started 6/16/24 CAP, last dose 6/19  -Sputum culture presented a few gram-positive growth    Lymphedema on LUE  Assessment & Plan  Chronic lymphedema on left upper extremity  US LUE 5/4/24 presented No gross evidence of left upper extremity DVT or SVT.   - CTM    HFmrEF  Assessment & Plan  Echocardiogram 5/30/24 presented LVEF of 45% with moderate concentric left ventricular hypertrophy.  Possibly, acute respiratory failure secondary to cardiorenal syndrome or acute on chronic heart failure but less likely   -Same plan as hypervolemia    Orthostatic dizziness  Assessment & Plan  Endorses worsening dizziness and lightheadedness when he is standing up from sitting up. Negative orthostatics by blood pressure but pulse does increase dramatically with ambulation  Has been tachycardic 160s by standing and walking.  (6/14) walking challenge presented patient only able to walk to the door and has significant dizziness with elevated heart rate 150s-160s.  (6/15) cardiologist consulted for A-fib RVR on exertion.  Recommended titrating metoprolol.  (6/16) HR labile 60 - 100s on resting  (6/17 and 6/18) atrial improves during resting but is still go to as high as 170-180 when ambulating   -Fall risk precaution  -Increase metoprolol SR to 75 mg bid on 6/18/24    Hypervolemia  Assessment & Plan  Patient endorses 5 pounds  weight gain past 3 weeks.  Has been following [facility].  Was sent to ED by recommended from nephrologist due to unresponsive p.o. Lasix.  Patient daily diuresis treatment, has lost 5.5 kg since admission to June 18.   - Nephrology consulted, appreciate recommendations  - IV Lasix 40 mg BID, transition to oral Lasix 6/13, decreased to 40 mg oral daily on 6/18  - Fluid restriction to 1L, low salt diet  - Daily standing weights  - Daily renal panel with mg    Acute on chronic renal insufficiency- (present on admission)  Assessment & Plan  Baseline creatinine 1.7 - 2.0  Nephrology consulted  -Same plan as hypervolemia    Stage 4 squamous cell cancer of left axillary region complicated by left axillary abcscess s/p drainage- (present on admission)  Assessment & Plan  Has been followed by outpatient oncologist.   Per patient, he is currently on immunotherapy and last therapy was on last Friday (6/7/24)  -Continue home prednisone and Sirolimus  - Wound consult placed  -Continue home gabapentin    A-fib (HCC) s/p Watchman- (present on admission)  Assessment & Plan  - Same plan as orthostatic dizziness  - Telemetry monitoring     Type 2 diabetes mellitus (HCC)- (present on admission)  Assessment & Plan  Hem A1c 11.2 (5/23/24)  Home glargine 20 units  -Increased glargine to 23 units on 6/17/24  -SSI  -Hypoglycemia protocol    GERD (gastroesophageal reflux disease)- (present on admission)  Assessment & Plan  - Continue home omeprazole    Coronary artery disease s/p stent- (present on admission)  Assessment & Plan  -Continue home aspirin and atorvastatin    Hyperlipidemia- (present on admission)  Assessment & Plan  -Continue home atorvastatin         VTE prophylaxis: heparin ppx    I have performed a physical exam and reviewed and updated ROS and Plan today (6/18/2024). In review of yesterday's note (6/17/2024), there are no changes except as documented above.

## 2024-06-18 NOTE — CARE PLAN
The patient is Watcher - Medium risk of patient condition declining or worsening    Shift Goals  Clinical Goals: wound care, pain control, decrease O2 need  Patient Goals: rest  Family Goals: maryellen    Progress made toward(s) clinical / shift goals:    Problem: Pain - Standard  Goal: Alleviation of pain or a reduction in pain to the patient’s comfort goal  Outcome: Progressing     Problem: Skin Integrity - Diabetes  Goal: Patient's skin on legs and feet will remain intact while hospitalized  Outcome: Progressing  Note: Pt turns self from side to side. Wound care onboard.  Pt educated about the importance of frequent repositioning to prevent skin breakdown. Encouraged turning in bed and notifying staff for help. Pt verbalized understanding. Appropriate dressings in place. Extra pillows in place for comfort, between knees, and to float heels. Barrier cream applied as appropriate. Pt cleaned and linens changed frequently as appropriate.            Patient is not progressing towards the following goals:

## 2024-06-18 NOTE — DISCHARGE PLANNING
HTH/SCP TCN chart review completed. Collaborated with DWAINE, RN. Current discharge considerations are home with home health and DME (O2) if needed at time of discharge.. Patient seen at bedside with family present. Patient/family verbalize concern with patient being alone for long periods of time, and would like HH.  Choice obtained and RN/CM informed.  Additionally patient is on RA at baseline and is currently utilizing Supplemental O2, choice obtained for DME (O2). TCN will continue to follow and collaborate with discharge planning team as additional post acute needs arise. Thank you.    Completed:  Choice obtained: SELAM, DAVE (O2 - [employer])  SCP with [facility] PCP. PCP f/u scheduled for 6/21

## 2024-06-18 NOTE — PROGRESS NOTES
[facility] -  PROGRESS NOTE               Author: Raphael Hua M.D. Date & Time: 6/18/2024  11:27 AM     HPI:  Patient is a 68 year old male with a PMHx of afib, CAD, renal transplant in 2010, PKD, HLD, HTN, and squamous cell carcinoma of the left axillary region on immunotherapy via Dr. Fajardo, DM with last A1c of 6.9%, who presented to hospital for SOB from outpatient nephrology clinic.  Pt also has orthopnea and significant lower extremity edema.  He is currently on sirolimus and pred for his transplant. Scr from 6/5 was 2.89 and his baseline is around 1.5-1.9. Nephrology was asked to consult for SANKET. Scr in the ER was 2.56. He states his SOB has been worsening over the past 3 weeks.      No F/C/N/V/CP/He does have SOB.  No melena, hematochezia, hematemesis.  No HA, visual changes, or abdominal pain. + edema bilateral LE    DAILY NEPHROLOGY SUMMARY:  6/12 - No new overnight renal events. Scr is 2.47 today. Diuresing well.   6/13 - Scr is stable. Good UOP. Feels better.   6/14 - Scr continues to head in the right direction. 2.34. HR 88-96 HR and then spikes to 160+ when standing or ambulating.    6/15 - No new overnight renal events. Afib , up to 160-170 when ambulating or standing. Scr is holding steady. Still SOB. Bilateral LE has improved significantly.  6/16 - No new overnight renal events. Diuresing well. Scr is down to 2.13.   6/17- No new labs,remains in AF, no c/o, denies feeling dizzy when he stands up   6/18: Cr unchanged from yesterday,2.5-2.6, BUN increasing, 1650cc UOP. VSS, feeling weak, poor stamina, attempted to walk down the simmons, desated and  heart rate increased 160      REVIEW OF SYSTEMS:    10 point ROS reviewed and is as per HPI/daily summary or otherwise negative    PMH/PSH/SH/FH:   Reviewed and unchanged since admission note    CURRENT MEDICATIONS:   Reviewed from admission to present day    VS:  /66   Pulse 91   Temp 36.2 °C (97.2 °F)  "(Temporal)   Resp 18   Ht 1.981 m (6' 6\")   Wt 97.5 kg (214 lb 15.2 oz)   SpO2 92%   BMI 24.84 kg/m²     Physical Exam  Vitals and nursing note reviewed.     Constitutional:       General: He is not in acute distress.     Appearance:  He is not ill-appearing.   HENT:      Head: Normocephalic and atraumatic.      Nose: Nose normal.      Mouth/Throat:      Mouth: Mucous membranes are moist.      Pharynx: Oropharynx is clear.   Eyes:      Extraocular Movements: Extraocular movements intact.      Conjunctiva/sclera: Conjunctivae normal.      Pupils: Pupils are equal, round, and reactive to light.   Cardiovascular:      Rate and Rhythm: tachycardia   Pulmonary:      Effort: No respiratory distress.      Breath sounds: minimal tanya crackles present.   Abdominal:      General: Abdomen is flat. Bowel sounds are normal.      Palpations: Abdomen is soft.   Musculoskeletal:      Cervical back: Normal range of motion and neck supple.      Right lower leg: Edema present. Improved - has compression stockings     Left lower leg: Edema present, improved  Neurological:      General: No focal deficit present.      Mental Status: He is alert and oriented to person, place, and time. Mental status is at baseline.   Psychiatric:         Mood and Affect: Mood normal.         Behavior: Behavior normal.         Thought Content: Thought content normal.         Judgment: Judgment normal.     Fluids:  In: 1080 [P.O.:1080]  Out: 1650     LABS:  Recent Labs     06/16/24  0708 06/17/24  0952 06/18/24  0223   SODIUM 137 132* 136   POTASSIUM 5.0 5.3 4.9   CHLORIDE 99 96 98   CO2 20 23 26   GLUCOSE 211* 318* 183*   BUN 67* 74* 77*   CREATININE 2.13* 2.54* 2.58*   CALCIUM 9.1 8.9 8.9       IMAGING:   All imaging reviewed from admission to present day    IMPRESSION:  # SANKET    - Improved since last Scr of 2.89 from 6/5/24. Baseline Scr is ~1.9  # Renal transplant    - Continue with home IS medications (Pred and Sirolimus)  # h/o ADPKD  # Mild " hyperkalemia,improved   # Acute pulmonary edema, HFrEF 45%  # PNA   # Acidosis  # Chronic Immunosuppression on medications (sirolimus and prednisone)  # Type 2 DM - last A1c was 6.9%  # Proteinuria  # Atrial fibrillation  # Vit d deficiency  # Squamous Cell Ca - getting immunotherapy/followed by Dr. Fajardo. He has mets to lymph nodes., chronic lymphdema of LUE  complicated by L axillary abscess s/p drainage   # Anemia      PLAN:  - There is no acute need for RRT.   - Creatinine is stable He does not want dialysis if it comes to it.   - Decrease  PO furosemide 40 mg daily rising BUN  - CXR  - Low salt diet  - Continue with home IS medications  - Dose all meds per decreased renal function   - Avoid nephrotoxins   - Rest of management per primary team

## 2024-06-19 ENCOUNTER — APPOINTMENT (OUTPATIENT)
Dept: RADIOLOGY | Facility: MEDICAL CENTER | Age: 69
DRG: 291 | End: 2024-06-19
Payer: MEDICARE

## 2024-06-19 LAB
ANION GAP SERPL CALC-SCNC: 11 MMOL/L (ref 7–16)
BUN SERPL-MCNC: 76 MG/DL (ref 8–22)
CALCIUM SERPL-MCNC: 8.9 MG/DL (ref 8.5–10.5)
CHLORIDE SERPL-SCNC: 96 MMOL/L (ref 96–112)
CO2 SERPL-SCNC: 25 MMOL/L (ref 20–33)
CREAT SERPL-MCNC: 2.4 MG/DL (ref 0.5–1.4)
ERYTHROCYTE [DISTWIDTH] IN BLOOD BY AUTOMATED COUNT: 49.8 FL (ref 35.9–50)
GFR SERPLBLD CREATININE-BSD FMLA CKD-EPI: 29 ML/MIN/1.73 M 2
GLUCOSE BLD STRIP.AUTO-MCNC: 178 MG/DL (ref 65–99)
GLUCOSE BLD STRIP.AUTO-MCNC: 230 MG/DL (ref 65–99)
GLUCOSE BLD STRIP.AUTO-MCNC: 266 MG/DL (ref 65–99)
GLUCOSE SERPL-MCNC: 254 MG/DL (ref 65–99)
HCT VFR BLD AUTO: 32.6 % (ref 42–52)
HGB BLD-MCNC: 10 G/DL (ref 14–18)
MAGNESIUM SERPL-MCNC: 2.1 MG/DL (ref 1.5–2.5)
MCH RBC QN AUTO: 25.6 PG (ref 27–33)
MCHC RBC AUTO-ENTMCNC: 30.7 G/DL (ref 32.3–36.5)
MCV RBC AUTO: 83.6 FL (ref 81.4–97.8)
PHOSPHATE SERPL-MCNC: 3.2 MG/DL (ref 2.5–4.5)
PLATELET # BLD AUTO: 65 K/UL (ref 164–446)
PLATELETS.RETICULATED NFR BLD AUTO: 15.1 % (ref 0.6–13.1)
PMV BLD AUTO: 13.4 FL (ref 9–12.9)
POTASSIUM SERPL-SCNC: 4.9 MMOL/L (ref 3.6–5.5)
RBC # BLD AUTO: 3.9 M/UL (ref 4.7–6.1)
SODIUM SERPL-SCNC: 132 MMOL/L (ref 135–145)
WBC # BLD AUTO: 8.8 K/UL (ref 4.8–10.8)

## 2024-06-19 PROCEDURE — 700111 HCHG RX REV CODE 636 W/ 250 OVERRIDE (IP)

## 2024-06-19 PROCEDURE — 85055 RETICULATED PLATELET ASSAY: CPT

## 2024-06-19 PROCEDURE — 82962 GLUCOSE BLOOD TEST: CPT | Mod: 91

## 2024-06-19 PROCEDURE — 83735 ASSAY OF MAGNESIUM: CPT

## 2024-06-19 PROCEDURE — 84100 ASSAY OF PHOSPHORUS: CPT

## 2024-06-19 PROCEDURE — A9270 NON-COVERED ITEM OR SERVICE: HCPCS

## 2024-06-19 PROCEDURE — 99232 SBSQ HOSP IP/OBS MODERATE 35: CPT | Mod: GC | Performed by: INTERNAL MEDICINE

## 2024-06-19 PROCEDURE — 770020 HCHG ROOM/CARE - TELE (206)

## 2024-06-19 PROCEDURE — 85027 COMPLETE CBC AUTOMATED: CPT

## 2024-06-19 PROCEDURE — 80048 BASIC METABOLIC PNL TOTAL CA: CPT

## 2024-06-19 PROCEDURE — 700102 HCHG RX REV CODE 250 W/ 637 OVERRIDE(OP)

## 2024-06-19 PROCEDURE — 700102 HCHG RX REV CODE 250 W/ 637 OVERRIDE(OP): Performed by: INTERNAL MEDICINE

## 2024-06-19 PROCEDURE — A9270 NON-COVERED ITEM OR SERVICE: HCPCS | Performed by: INTERNAL MEDICINE

## 2024-06-19 PROCEDURE — 71046 X-RAY EXAM CHEST 2 VIEWS: CPT

## 2024-06-19 PROCEDURE — 36415 COLL VENOUS BLD VENIPUNCTURE: CPT

## 2024-06-19 RX ORDER — FUROSEMIDE 40 MG/1
40 TABLET ORAL
Status: DISCONTINUED | OUTPATIENT
Start: 2024-06-19 | End: 2024-06-21 | Stop reason: HOSPADM

## 2024-06-19 RX ORDER — METOPROLOL SUCCINATE 25 MG/1
100 TABLET, EXTENDED RELEASE ORAL 2 TIMES DAILY
Status: DISCONTINUED | OUTPATIENT
Start: 2024-06-20 | End: 2024-06-21 | Stop reason: HOSPADM

## 2024-06-19 RX ORDER — GABAPENTIN 100 MG/1
100 CAPSULE ORAL EVERY EVENING
Status: DISCONTINUED | OUTPATIENT
Start: 2024-06-19 | End: 2024-06-20

## 2024-06-19 RX ORDER — MIDODRINE HYDROCHLORIDE 5 MG/1
5 TABLET ORAL
Status: DISCONTINUED | OUTPATIENT
Start: 2024-06-19 | End: 2024-06-20

## 2024-06-19 RX ORDER — DEXTROSE MONOHYDRATE 25 G/50ML
25 INJECTION, SOLUTION INTRAVENOUS
Status: DISCONTINUED | OUTPATIENT
Start: 2024-06-19 | End: 2024-06-21 | Stop reason: HOSPADM

## 2024-06-19 RX ADMIN — MIDODRINE HYDROCHLORIDE 5 MG: 5 TABLET ORAL at 11:23

## 2024-06-19 RX ADMIN — PREDNISONE 20 MG: 20 TABLET ORAL at 05:11

## 2024-06-19 RX ADMIN — MIDODRINE HYDROCHLORIDE 5 MG: 5 TABLET ORAL at 17:13

## 2024-06-19 RX ADMIN — ACETAMINOPHEN 650 MG: 325 TABLET, FILM COATED ORAL at 11:19

## 2024-06-19 RX ADMIN — METOPROLOL SUCCINATE 75 MG: 25 TABLET, EXTENDED RELEASE ORAL at 17:12

## 2024-06-19 RX ADMIN — LEVOFLOXACIN 250 MG: 500 TABLET, FILM COATED ORAL at 08:33

## 2024-06-19 RX ADMIN — INSULIN LISPRO 3 UNITS: 100 INJECTION, SOLUTION INTRAVENOUS; SUBCUTANEOUS at 20:05

## 2024-06-19 RX ADMIN — INSULIN LISPRO 2 UNITS: 100 INJECTION, SOLUTION INTRAVENOUS; SUBCUTANEOUS at 08:38

## 2024-06-19 RX ADMIN — MOMETASONE FUROATE AND FORMOTEROL FUMARATE DIHYDRATE 2 PUFF: 200; 5 AEROSOL RESPIRATORY (INHALATION) at 17:15

## 2024-06-19 RX ADMIN — OMEPRAZOLE 20 MG: 20 CAPSULE, DELAYED RELEASE ORAL at 05:04

## 2024-06-19 RX ADMIN — ATORVASTATIN CALCIUM 80 MG: 80 TABLET, FILM COATED ORAL at 20:03

## 2024-06-19 RX ADMIN — ASPIRIN 81 MG: 81 TABLET, COATED ORAL at 05:04

## 2024-06-19 RX ADMIN — MOMETASONE FUROATE AND FORMOTEROL FUMARATE DIHYDRATE 2 PUFF: 200; 5 AEROSOL RESPIRATORY (INHALATION) at 05:05

## 2024-06-19 RX ADMIN — GABAPENTIN 100 MG: 100 CAPSULE ORAL at 17:12

## 2024-06-19 RX ADMIN — GUAIFENESIN 600 MG: 600 TABLET, EXTENDED RELEASE ORAL at 17:12

## 2024-06-19 RX ADMIN — ACETAMINOPHEN 650 MG: 325 TABLET, FILM COATED ORAL at 01:37

## 2024-06-19 RX ADMIN — SIROLIMUS 4 MG: 0.5 TABLET ORAL at 05:04

## 2024-06-19 RX ADMIN — INSULIN LISPRO 5 UNITS: 100 INJECTION, SOLUTION INTRAVENOUS; SUBCUTANEOUS at 12:06

## 2024-06-19 RX ADMIN — FUROSEMIDE 40 MG: 40 TABLET ORAL at 17:13

## 2024-06-19 RX ADMIN — OXYCODONE HYDROCHLORIDE 5 MG: 5 TABLET ORAL at 21:51

## 2024-06-19 RX ADMIN — OXYCODONE HYDROCHLORIDE 5 MG: 5 TABLET ORAL at 14:58

## 2024-06-19 RX ADMIN — METOPROLOL SUCCINATE 75 MG: 25 TABLET, EXTENDED RELEASE ORAL at 05:04

## 2024-06-19 RX ADMIN — GUAIFENESIN 600 MG: 600 TABLET, EXTENDED RELEASE ORAL at 05:04

## 2024-06-19 RX ADMIN — FUROSEMIDE 40 MG: 40 TABLET ORAL at 05:05

## 2024-06-19 ASSESSMENT — PAIN DESCRIPTION - PAIN TYPE
TYPE: ACUTE PAIN

## 2024-06-19 ASSESSMENT — ENCOUNTER SYMPTOMS
FEVER: 0
SPUTUM PRODUCTION: 1
CHILLS: 0
NAUSEA: 0
ORTHOPNEA: 1
DIZZINESS: 1
SHORTNESS OF BREATH: 1
COUGH: 1
WHEEZING: 0
HEMOPTYSIS: 0
VOMITING: 0

## 2024-06-19 ASSESSMENT — FIBROSIS 4 INDEX: FIB4 SCORE: 4.5

## 2024-06-19 NOTE — WOUND TEAM
Patient refusing wound care assessment of sacrum or arms today. Physician and primary RN are aware. Wound Care RN will follow up at a later time.

## 2024-06-19 NOTE — PROGRESS NOTES
[facility] -  PROGRESS NOTE               Author: Raphael Hua M.D. Date & Time: 6/19/2024  11:47 AM     HPI:  Patient is a 68 year old male with a PMHx of afib, CAD, renal transplant in 2010, PKD, HLD, HTN, and squamous cell carcinoma of the left axillary region on immunotherapy via Dr. Fajardo, DM with last A1c of 6.9%, who presented to hospital for SOB from outpatient nephrology clinic.  Pt also has orthopnea and significant lower extremity edema.  He is currently on sirolimus and pred for his transplant. Scr from 6/5 was 2.89 and his baseline is around 1.5-1.9. Nephrology was asked to consult for SANKET. Scr in the ER was 2.56. He states his SOB has been worsening over the past 3 weeks.      No F/C/N/V/CP/He does have SOB.  No melena, hematochezia, hematemesis.  No HA, visual changes, or abdominal pain. + edema bilateral LE    DAILY NEPHROLOGY SUMMARY:  6/12 - No new overnight renal events. Scr is 2.47 today. Diuresing well.   6/13 - Scr is stable. Good UOP. Feels better.   6/14 - Scr continues to head in the right direction. 2.34. HR 88-96 HR and then spikes to 160+ when standing or ambulating.    6/15 - No new overnight renal events. Afib , up to 160-170 when ambulating or standing. Scr is holding steady. Still SOB. Bilateral LE has improved significantly.  6/16 - No new overnight renal events. Diuresing well. Scr is down to 2.13.   6/17- No new labs,remains in AF, no c/o, denies feeling dizzy when he stands up   6/18: Cr unchanged from yesterday,2.5-2.6, BUN increasing, 1650cc UOP. VSS, feeling weak, poor stamina, attempted to walk down the simmons, desated and  heart rate increased 160    6/19: No acute events, 1600 cc UOP, CXR findings with patchy BL opacities, small BL pleural effusions, BP soft     REVIEW OF SYSTEMS:    10 point ROS reviewed and is as per HPI/daily summary or otherwise negative    PMH/PSH/SH/FH:   Reviewed and unchanged since admission note    CURRENT  "MEDICATIONS:   Reviewed from admission to present day    VS:  /65   Pulse 89   Temp 36.6 °C (97.9 °F) (Temporal)   Resp 16   Ht 1.981 m (6' 6\")   Wt 97.4 kg (214 lb 11.7 oz)   SpO2 93%   BMI 24.81 kg/m²     Physical Exam  Vitals and nursing note reviewed.   Constitutional:       General: He is in acute distress.   HENT:      Head: Normocephalic and atraumatic.      Mouth/Throat:      Mouth: Mucous membranes are moist.   Eyes:      Conjunctiva/sclera: Conjunctivae normal.   Cardiovascular:      Rate and Rhythm: Normal rate.   Pulmonary:      Effort: Pulmonary effort is normal.      Comments: Coarse BS BL  Abdominal:      Palpations: Abdomen is soft.   Musculoskeletal:         General: Swelling present.      Comments: LUE lymphedema   RUE edema    Skin:     General: Skin is warm.   Neurological:      General: No focal deficit present.      Mental Status: He is oriented to person, place, and time.   Psychiatric:         Mood and Affect: Mood normal.         Fluids:  In: 977 [P.O.:977]  Out: 1600     LABS:  Recent Labs     06/17/24  0952 06/18/24  0223 06/19/24  0150   SODIUM 132* 136 132*   POTASSIUM 5.3 4.9 4.9   CHLORIDE 96 98 96   CO2 23 26 25   GLUCOSE 318* 183* 254*   BUN 74* 77* 76*   CREATININE 2.54* 2.58* 2.40*   CALCIUM 8.9 8.9 8.9       IMAGING:   All imaging reviewed from admission to present day    IMPRESSION:  # SANKET    - Improved since last Scr of 2.89 from 6/5/24. Baseline Scr is ~1.9  # Renal transplant    - Continue with home IS medications (Pred and Sirolimus)  # h/o ADPKD  # Mild hyperkalemia,resolved   # Acute pulmonary edema, HFrEF 45%  # PNA   # Acidosis, resolved   # Chronic Immunosuppression on medications (sirolimus and prednisone)  # Type 2 DM - last A1c was 6.9%  # Proteinuria  # Atrial fibrillation  # Vit d deficiency  # Squamous Cell Ca - getting immunotherapy/followed by Dr. Fajardo. He has mets to lymph nodes., chronic lymphdema of LUE  complicated by L axillary abscess s/p " drainage   # Anemia   # Hypotension   # Hypervolemic hyponatremia corrected for BS      PLAN:  - There is no acute need for RRT.   - Creatinine is stable He does not want dialysis if it comes to it.   - Increase PO furosemide 40 mg bid ,CXR findings with volume overload   - Low salt diet  - Continue with home IS medications  - Dose all meds per decreased renal function   - Avoid nephrotoxins   - Midodrine 5 mg tid   - Rest of management per primary team

## 2024-06-19 NOTE — PROGRESS NOTES
@ 0800, patient's BP was 89/56.     Notified resident about the above BP, per Resident will monitor. No dizziness or chest pain per patient.

## 2024-06-19 NOTE — CARE PLAN
The patient is Stable - Low risk of patient condition declining or worsening    Shift Goals  Clinical Goals: wound care, cardiac stability  Patient Goals: rest, limit interuptions  Family Goals: JUDITH    Progress made toward(s) clinical / shift goals:    Problem: Knowledge Deficit - Standard  Goal: Patient and family/care givers will demonstrate understanding of plan of care, disease process/condition, diagnostic tests and medications  Description: Target End Date:  1-3 days or as soon as patient condition allows    Document in Patient Education    1.  Patient and family/caregiver oriented to unit, equipment, visitation policy and means for communicating concern  2.  Complete/review Learning Assessment  3.  Assess knowledge level of disease process/condition, treatment plan, diagnostic tests and medications  4.  Explain disease process/condition, treatment plan, diagnostic tests and medications  Outcome: Progressing  Note: Pt is updated of plan of care at bedside shift report, pt's questions and concerns are addressed. Pts barriers to discharge are addressed.      Problem: Diabetes Management  Goal: Patient will achieve and maintain glucose in satisfactory range  Description: Target End Date:  Prior to discharge or change in level of care    1.  Monitor glucose levels per provider order  2.  Assess for signs and symptoms of hyperglycemia (abdominal pain, bloating, nausea or vomiting)  3.  Assess for signs and symptoms of hypoglycemia (anxiety, tremors, slurred speech, change in LOC, tachycardia, fatigue)  4.  Monitor for early signs and symptoms of infection  5.  Monitor daily weights  6.  Consult to diabetes educator  Outcome: Progressing  Note: Pts blood sugars are monitored and treated per mar, pt is educated on s/s of hypoglycemia.      Problem: Care Map:  Day Before Discharge Optimal Outcome for the Heart Failure Patient  Goal: Day Before Discharge:  Optimal Care of the heart failure patient  Description: Target  End Date:  Prior to discharge or change in level of care  Outcome: Progressing  Note: Pt is educated on heart failure self are including daily weights, appropriate diet and adherence to medication regimen.

## 2024-06-19 NOTE — PROGRESS NOTES
Telemetry Report  Rhythm afib   Heart Rate 90  Ectopy PACs     MO  QRS  0.153  QT            Per telemetry room monitor

## 2024-06-19 NOTE — PROGRESS NOTES
Pt is alert, oriented x4. Pt has complaints of back pain relieved by tylenol. Pt is encouraged to participate in incentive spirometer. Pt currently on 2.5 L O2, unable to complete oxy walk at this time d/t activity intolerance. No calls from tele overnight regarding RVR, currently afib in the 90's.  Wound care complete.  VSS, call light in reach.

## 2024-06-19 NOTE — PROGRESS NOTES
[facility] Internal Medicine Daily Progress Note    Date of Service  6/19/2024    R Team: R IM Green Team   Attending: Charlie Casey M.d.  Senior Resident: Dr. Betsy Muñoz  Intern:  Dr. Eliud Oleary  Contact Number: [phone]    Chief Complaint  Jama Altman is a 68 y.o. male admitted 6/11/2024 with leg swelling and dizziness    Hospital Course  Jama Altman is a 68 y.o. 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 CAD s/p stent, renal transplant 2010 on chronic immunosuppressant therapy, (hx of polycystic kidney disease), HLD, HTN and PAD who was admitted due to hypervolemia in the setting of acute on chronic kidney injury and acute hypoxic failure.    At ED,  saturating 94% at 2 L of oxygen but afebrile.  CMP was remarkable for elevated BUN of 80 and elevated creatinine of 2.59.  Chest x-ray presented hypoinflation with bibasilar atelectasis/pneumonitis and small pleural effusion without significant changes compared to 5/28/24 chest x-ray.  At ED, patient received IV Lasix of 60 mg.   Nephrology has been following and recommended diuresing.  Has been treating with antibiotics for possible pneumonia.  D-dimer was elevated. With acute kidney injury and history of Kidney transplant, V/Q scan was ordered but patient could not lie down flat more than 15 mins so V/Q scan got canceled. Patient agreed to start heparin to treat possible pulmonary embolism after discussion benefit and risk of heparin including bleeding risk. Cardiologist consulted for atrial fibrillation with RVR on exertion. Infectious disease was consulted and recommended 7 days of levofloxacin. Pulm was consulted, recommended discontinuing heparin (no concern for PE), recommended continuing Abx and diuresing. CT thorax w/o if his symptom is not improving.        Interval Problem Update  -  No overnight acute distress, vitals are stable, under 2.5L oxygen.   - BUN(76) and Cr(2.4) slightly better today, patient has 1600 ml urine output under one dose of lasix, has lost 5.6 kg since admission.  Repeat 2-view CXR is still showing fluid overload, nephrologist is OK to increase lasix to bid again.   - Sodium 132, Glu 254, corrected Sodium 136; patient received 6 units of lispro in addition to 23 units of glargine, but fasting Glu again increased above 200, will increase Glargine to 26 units   - Metoprolol is increased to 75 mg bid, patient is tolerating well, pending walking test today to re-evaluation.       I have discussed this patient's plan of care and discharge plan at IDT rounds today with Case Management, Nursing, Nursing leadership, and other members of the IDT team.    Consultants/Specialty  nephrology- Dr. Wilder   Cardiology  Infectious disease  Pulmonology     Code Status  Full Code    Disposition  The patient is not medically cleared for discharge to home or a post-acute facility.      Review of Systems  Review of Systems   Constitutional:  Negative for chills and fever.   Respiratory:  Positive for cough, sputum production and shortness of breath. Negative for hemoptysis and wheezing.         Coughing and SOB has been improving   Cardiovascular:  Positive for orthopnea. Negative for chest pain and leg swelling.        Leg swelling improving   Gastrointestinal:  Negative for nausea and vomiting.   Neurological:  Positive for dizziness.        Dizziness when he walks        Physical Exam  Temp:  [36.5 °C (97.7 °F)-36.6 °C (97.9 °F)] 36.6 °C (97.9 °F)  Pulse:  [83-89] 89  Resp:  [16-18] 16  BP: ()/(56-66) 106/65  SpO2:  [91 %-95 %] 93 %    Physical Exam  Vitals reviewed.   Constitutional:       General: He is not in acute distress.     Appearance: He is not ill-appearing, toxic-appearing or diaphoretic.   HENT:      Head: Normocephalic and atraumatic.      Mouth/Throat:      Mouth: Mucous  membranes are moist.      Pharynx: Oropharynx is clear. No oropharyngeal exudate.   Eyes:      Extraocular Movements: Extraocular movements intact.      Pupils: Pupils are equal, round, and reactive to light.   Cardiovascular:      Rate and Rhythm: Normal rate. Rhythm irregular.      Heart sounds: No murmur heard.  Pulmonary:      Effort: Pulmonary effort is normal. No respiratory distress.   Abdominal:      General: Abdomen is flat. There is no distension.      Palpations: Abdomen is soft.      Tenderness: There is no abdominal tenderness. There is no guarding or rebound.   Musculoskeletal:         General: Normal range of motion.      Cervical back: Normal range of motion. No rigidity.      Right lower leg: No edema.      Left lower leg: No edema.      Comments: Swelling on left upper extremity, patient stated its chronic  Swelling on the right forearm   Skin:     General: Skin is warm and dry.      Coloration: Skin is not jaundiced.      Findings: Lesion present.      Comments: lesion dressing applied on left axillary region  Metastatic cancer with necrosis   Neurological:      General: No focal deficit present.      Mental Status: He is alert and oriented to person, place, and time.      Cranial Nerves: No cranial nerve deficit.   Psychiatric:         Mood and Affect: Mood normal.         Behavior: Behavior normal.         Fluids    Intake/Output Summary (Last 24 hours) at 6/19/2024 1225  Last data filed at 6/19/2024 0430  Gross per 24 hour   Intake 237 ml   Output 300 ml   Net -63 ml       Laboratory  Recent Labs     06/19/24  0150   WBC 8.8   RBC 3.90*   HEMOGLOBIN 10.0*   HEMATOCRIT 32.6*   MCV 83.6   MCH 25.6*   MCHC 30.7*   RDW 49.8   PLATELETCT 65*   MPV 13.4*     Recent Labs     06/17/24  0952 06/18/24  0223 06/19/24  0150   SODIUM 132* 136 132*   POTASSIUM 5.3 4.9 4.9   CHLORIDE 96 98 96   CO2 23 26 25   GLUCOSE 318* 183* 254*   BUN 74* 77* 76*   CREATININE 2.54* 2.58* 2.40*   CALCIUM 8.9 8.9 8.9                      Imaging  DX-CHEST-PORTABLE (1 VIEW)   Final Result         Small bilateral pleural effusions, left more than right, with bibasilar atelectasis.      DX-CHEST-2 VIEWS   Final Result      1.  Stable cardiomegaly and interstitial edema.      2.  Stable bibasilar atelectasis/consolidation and bilateral pleural effusions.      DX-CHEST-PORTABLE (1 VIEW)   Final Result         1.  Pulmonary edema and/or infiltrates, increased since prior study.   2.  Moderate bilateral layering pleural effusions   3.  Cardiomegaly   4.  Atherosclerosis      DX-CHEST-2 VIEWS   Final Result      Small effusions with bibasilar atelectasis/infiltrate. There is also mildly increased right upper lobe infiltrate.      DX-CHEST-PORTABLE (1 VIEW)   Final Result      Hypoinflation with bibasilar atelectasis/pneumonitis and trace/small pleural effusions. No significant change.      DX-CHEST-2 VIEWS    (Results Pending)         Assessment/Plan  Problem Representation:    * Acute respiratory failure with hypoxia (HCC)- (present on admission)  Assessment & Plan  Patient has been endorsing shortness of breath and orthopnea.  Hypervolemic on assessment.  Requiring 2 L oxygen at ED.  Chest x-ray presented hypoinflation with bibasilar atelectasis/pneumonitis and small pleural effusion without significant changes compared to 5/28/24 chest x-ray. 2 view CXR with RUL infiltrate, procal elevated 0.4 on admission   Likely due to fluid overload from acute kidney injury, possible pneumonia (high risk due to immunosuppression), less likely acute on chronic heart failure. MRSA nares positive March    (6/13/24) increased oxygen requirement overnight.  Two-view chest x-ray was unremarkable compared to yesterday.  Repeat procalcitonin trending down to 0.34.   (6/14/24) Pro-Randy trending down 0.25.  D-dimer was elevated.  Discussed risk and benefit to start heparin to treat possible pulmonary embolism, patient agreed.  Started heparin  (6/15/24) still  require 1.5 L of oxygen same as yesterday.  Discontinued Metolazone. ID switched to Levofloxacin  (6/16/24) Went up to 2L of O2. Pulm was consulted, recommended discontinuing heparin (no concern for PE), recommended continuing Abx and diuresing. CT thorax w/o if his symptom is not improving.  Heparin discontinued  Heparin 6/14 - 16  (6/17/24) Patient feels improvement.   (6/18/24) Patient continue to feel improvement, but BUN increase, per nephrologist Lasix decreased to one dose per day.  Patient is not on obvious fluid overload status.  (6/19/24) Repeat 2-view CXR still showing fluid overload in the lung, lasix increased to bid 40mg per nephrologist.   -Transition to oral Lasix 40 mg twice daily starting from 6/13/2024, decreased to 40 mg daily on 6/18/24, and resume to bid on 6/19  -Levofloxacin started 6/16/24 CAP, last dose 6/19  -Sputum culture presented a few gram-positive growth    Lymphedema on LUE  Assessment & Plan  Chronic lymphedema on left upper extremity  US LUE 5/4/24 presented No gross evidence of left upper extremity DVT or SVT.   - CTM    HFmrEF  Assessment & Plan  Echocardiogram 5/30/24 presented LVEF of 45% with moderate concentric left ventricular hypertrophy.  Possibly, acute respiratory failure secondary to cardiorenal syndrome or acute on chronic heart failure but less likely   -Same plan as hypervolemia    Orthostatic dizziness  Assessment & Plan  Endorses worsening dizziness and lightheadedness when he is standing up from sitting up. Negative orthostatics by blood pressure but pulse does increase dramatically with ambulation  Has been tachycardic 160s by standing and walking.  (6/14) walking challenge presented patient only able to walk to the door and has significant dizziness with elevated heart rate 150s-160s.  (6/15) cardiologist consulted for A-fib RVR on exertion.  Recommended titrating metoprolol.  (6/16) HR labile 60 - 100s on resting  (6/17 and 6/18) atrial improves during resting  but is still go to as high as 170-180 when ambulating, titration the metoprolol dose.    -Fall risk precaution  -Increase metoprolol SR to 75 mg bid on 6/18/24    Hypervolemia  Assessment & Plan  Patient endorses 5 pounds weight gain past 3 weeks.  Has been following [facility].  Was sent to ED by recommended from nephrologist due to unresponsive p.o. Lasix.  Patient daily diuresis treatment, has lost 5.5 kg since admission.   - Nephrology consulted, appreciate recommendations  - IV Lasix 40 mg BID, transition to oral Lasix 6/13, decreased to 40 mg oral daily on 6/18 and increased to bid 40mg on 6/19  - Fluid restriction to 1L, low salt diet  - Daily standing weights  - Daily renal panel with mg    Acute on chronic renal insufficiency- (present on admission)  Assessment & Plan  Baseline creatinine 1.7 - 2.0  Nephrology consulted  -Same plan as hypervolemia    Stage 4 squamous cell cancer of left axillary region complicated by left axillary abcscess s/p drainage- (present on admission)  Assessment & Plan  Has been followed by outpatient oncologist.   Per patient, he is currently on immunotherapy and last therapy was on last Friday (6/7/24)  -Continue home prednisone and Sirolimus  - Wound consult placed  -Continue home gabapentin    A-fib (HCC) s/p Watchman- (present on admission)  Assessment & Plan  - Same plan as orthostatic dizziness  - Telemetry monitoring     Type 2 diabetes mellitus (HCC)- (present on admission)  Assessment & Plan  Hem A1c 11.2 (5/23/24)  Home glargine 20 units  -Increased glargine to 23 units on 6/17/24  -SSI  -Hypoglycemia protocol    GERD (gastroesophageal reflux disease)- (present on admission)  Assessment & Plan  - Continue home omeprazole    Coronary artery disease s/p stent- (present on admission)  Assessment & Plan  -Continue home aspirin and atorvastatin    Hyperlipidemia- (present on admission)  Assessment & Plan  -Continue home atorvastatin         VTE prophylaxis:  heparin ppx    I have performed a physical exam and reviewed and updated ROS and Plan today (6/19/2024). In review of yesterday's note (6/18/2024), there are no changes except as documented above.

## 2024-06-20 LAB
ANION GAP SERPL CALC-SCNC: 12 MMOL/L (ref 7–16)
BUN SERPL-MCNC: 86 MG/DL (ref 8–22)
CALCIUM SERPL-MCNC: 8.9 MG/DL (ref 8.5–10.5)
CHLORIDE SERPL-SCNC: 98 MMOL/L (ref 96–112)
CO2 SERPL-SCNC: 26 MMOL/L (ref 20–33)
CREAT SERPL-MCNC: 2.61 MG/DL (ref 0.5–1.4)
GFR SERPLBLD CREATININE-BSD FMLA CKD-EPI: 26 ML/MIN/1.73 M 2
GLUCOSE BLD STRIP.AUTO-MCNC: 151 MG/DL (ref 65–99)
GLUCOSE BLD STRIP.AUTO-MCNC: 286 MG/DL (ref 65–99)
GLUCOSE BLD STRIP.AUTO-MCNC: 336 MG/DL (ref 65–99)
GLUCOSE SERPL-MCNC: 214 MG/DL (ref 65–99)
POTASSIUM SERPL-SCNC: 5 MMOL/L (ref 3.6–5.5)
SODIUM SERPL-SCNC: 136 MMOL/L (ref 135–145)

## 2024-06-20 PROCEDURE — 770020 HCHG ROOM/CARE - TELE (206)

## 2024-06-20 PROCEDURE — 82962 GLUCOSE BLOOD TEST: CPT | Mod: 91

## 2024-06-20 PROCEDURE — A9270 NON-COVERED ITEM OR SERVICE: HCPCS

## 2024-06-20 PROCEDURE — A9270 NON-COVERED ITEM OR SERVICE: HCPCS | Performed by: INTERNAL MEDICINE

## 2024-06-20 PROCEDURE — 700102 HCHG RX REV CODE 250 W/ 637 OVERRIDE(OP): Performed by: INTERNAL MEDICINE

## 2024-06-20 PROCEDURE — 700111 HCHG RX REV CODE 636 W/ 250 OVERRIDE (IP)

## 2024-06-20 PROCEDURE — 700102 HCHG RX REV CODE 250 W/ 637 OVERRIDE(OP)

## 2024-06-20 PROCEDURE — 99232 SBSQ HOSP IP/OBS MODERATE 35: CPT | Mod: GC | Performed by: INTERNAL MEDICINE

## 2024-06-20 PROCEDURE — 80048 BASIC METABOLIC PNL TOTAL CA: CPT

## 2024-06-20 PROCEDURE — 99232 SBSQ HOSP IP/OBS MODERATE 35: CPT | Mod: FS | Performed by: INTERNAL MEDICINE

## 2024-06-20 RX ORDER — MIDODRINE HYDROCHLORIDE 5 MG/1
10 TABLET ORAL
Status: DISCONTINUED | OUTPATIENT
Start: 2024-06-20 | End: 2024-06-21 | Stop reason: HOSPADM

## 2024-06-20 RX ORDER — VERAPAMIL HYDROCHLORIDE 120 MG/1
120 CAPSULE, EXTENDED RELEASE ORAL
Status: DISCONTINUED | OUTPATIENT
Start: 2024-06-20 | End: 2024-06-20

## 2024-06-20 RX ORDER — GABAPENTIN 100 MG/1
200 CAPSULE ORAL 2 TIMES DAILY
Status: DISCONTINUED | OUTPATIENT
Start: 2024-06-20 | End: 2024-06-21 | Stop reason: HOSPADM

## 2024-06-20 RX ADMIN — ACETAMINOPHEN 650 MG: 325 TABLET, FILM COATED ORAL at 08:11

## 2024-06-20 RX ADMIN — GABAPENTIN 200 MG: 100 CAPSULE ORAL at 12:06

## 2024-06-20 RX ADMIN — ATORVASTATIN CALCIUM 80 MG: 80 TABLET, FILM COATED ORAL at 20:57

## 2024-06-20 RX ADMIN — ASPIRIN 81 MG: 81 TABLET, COATED ORAL at 05:12

## 2024-06-20 RX ADMIN — FUROSEMIDE 40 MG: 40 TABLET ORAL at 05:12

## 2024-06-20 RX ADMIN — FUROSEMIDE 40 MG: 40 TABLET ORAL at 15:52

## 2024-06-20 RX ADMIN — GABAPENTIN 200 MG: 100 CAPSULE ORAL at 17:10

## 2024-06-20 RX ADMIN — MIDODRINE HYDROCHLORIDE 10 MG: 5 TABLET ORAL at 16:58

## 2024-06-20 RX ADMIN — METOPROLOL SUCCINATE 100 MG: 25 TABLET, EXTENDED RELEASE ORAL at 05:11

## 2024-06-20 RX ADMIN — GUAIFENESIN 600 MG: 600 TABLET, EXTENDED RELEASE ORAL at 16:58

## 2024-06-20 RX ADMIN — METOPROLOL SUCCINATE 100 MG: 25 TABLET, EXTENDED RELEASE ORAL at 18:18

## 2024-06-20 RX ADMIN — PREDNISONE 20 MG: 20 TABLET ORAL at 05:12

## 2024-06-20 RX ADMIN — SIROLIMUS 4 MG: 0.5 TABLET ORAL at 05:12

## 2024-06-20 RX ADMIN — OMEPRAZOLE 20 MG: 20 CAPSULE, DELAYED RELEASE ORAL at 05:12

## 2024-06-20 RX ADMIN — ACETAMINOPHEN 650 MG: 325 TABLET, FILM COATED ORAL at 16:59

## 2024-06-20 RX ADMIN — INSULIN LISPRO 6 UNITS: 100 INJECTION, SOLUTION INTRAVENOUS; SUBCUTANEOUS at 13:20

## 2024-06-20 RX ADMIN — OXYCODONE HYDROCHLORIDE 5 MG: 5 TABLET ORAL at 22:11

## 2024-06-20 RX ADMIN — INSULIN LISPRO 5 UNITS: 100 INJECTION, SOLUTION INTRAVENOUS; SUBCUTANEOUS at 20:59

## 2024-06-20 RX ADMIN — MIDODRINE HYDROCHLORIDE 5 MG: 5 TABLET ORAL at 08:11

## 2024-06-20 RX ADMIN — GUAIFENESIN 600 MG: 600 TABLET, EXTENDED RELEASE ORAL at 05:12

## 2024-06-20 RX ADMIN — MOMETASONE FUROATE AND FORMOTEROL FUMARATE DIHYDRATE 2 PUFF: 200; 5 AEROSOL RESPIRATORY (INHALATION) at 05:11

## 2024-06-20 RX ADMIN — MOMETASONE FUROATE AND FORMOTEROL FUMARATE DIHYDRATE 2 PUFF: 200; 5 AEROSOL RESPIRATORY (INHALATION) at 18:18

## 2024-06-20 ASSESSMENT — ENCOUNTER SYMPTOMS
ORTHOPNEA: 1
SPUTUM PRODUCTION: 1
SHORTNESS OF BREATH: 1
VOMITING: 0
DIARRHEA: 0
HEADACHES: 0
DIAPHORESIS: 0
WEAKNESS: 1
COUGH: 1
PALPITATIONS: 0
WHEEZING: 0
FEVER: 0
PSYCHIATRIC NEGATIVE: 1
NAUSEA: 0
CHILLS: 0
DIZZINESS: 1
FATIGUE: 1
HEMOPTYSIS: 0
CHEST TIGHTNESS: 0
MUSCULOSKELETAL NEGATIVE: 1
DIZZINESS: 0
LIGHT-HEADEDNESS: 0

## 2024-06-20 ASSESSMENT — PATIENT HEALTH QUESTIONNAIRE - PHQ9
2. FEELING DOWN, DEPRESSED, IRRITABLE, OR HOPELESS: NOT AT ALL
2. FEELING DOWN, DEPRESSED, IRRITABLE, OR HOPELESS: NOT AT ALL
SUM OF ALL RESPONSES TO PHQ9 QUESTIONS 1 AND 2: 0
SUM OF ALL RESPONSES TO PHQ9 QUESTIONS 1 AND 2: 0
1. LITTLE INTEREST OR PLEASURE IN DOING THINGS: NOT AT ALL
1. LITTLE INTEREST OR PLEASURE IN DOING THINGS: NOT AT ALL

## 2024-06-20 ASSESSMENT — PAIN DESCRIPTION - PAIN TYPE
TYPE: CHRONIC PAIN

## 2024-06-20 ASSESSMENT — FIBROSIS 4 INDEX: FIB4 SCORE: 4.5

## 2024-06-20 NOTE — DISCHARGE SUMMARY
Discharge Summary    CHIEF COMPLAINT ON ADMISSION  Chief Complaint   Patient presents with    Shortness of Breath     Pt reports he has been having lymphedema to L arm and they have been wrapping it and feels as though the fluid has entered his lungs and he is now having some difficulty breathing.     High Blood Sugar     [id number]       Reason for Admission  Sent by MD; Elevated BGL     Admission Date  5/28/2024    CODE STATUS  Full Code    HPI & HOSPITAL COURSE  This is a 68 y.o. male here with    Jama Altman is a 68 y.o. male who presented 5/28/2024 with past medical history atrial fibrillation, status post watchman, coronary disease, renal transplant 2010, polycystic kidney disease, hyperlipidemia, hypertension, squamous cell carcinoma left axillary region, diabetes who presents to the hospital for shortness of breath for the past 1 week.  Patient does have shortness of breath on exertion.  It is associated with orthopnea, nonproductive cough, lower extremity edema and chest pain.  The chest pain is center of her chest, nonradiating but does get worse with exertion.  Patient denies any fevers, chills, nausea, vomiting, diarrhea.  Patient does have a left axillary wound and follows the wound care clinic.  Patient states that he takes care of her well and denies any drainage or redness in the area.  2 months ago he was transitioning from mycophenolic acid and tacrolimus to sirolimus and 20 mg of prednisone.     Chest x-ray interpreted by me found bilateral infiltrates, bilateral pulm edema, bilateral pleural effusion  EKG interpreted by me found atrial fibrillation, right bundle branch block, PVCs     COVID test is still pending    patient reports recent baseline serum creatinine around 2.    Diuresed  TTE unchanged from prior, mildly reduced LVEF 45%, valves normal.  Transitioned from IV diuresis to PO diuresis.   Discussed with Dr. Ramirez from nephrology and cleared, renal function back to baseline.  Added  Elayne.  Vitals and labs stable, requesting discharge    Therefore, he is discharged in good and stable condition to home with close outpatient follow-up.    The patient met 2-midnight criteria for an inpatient stay at the time of discharge.    Discharge Date  5/31/2024    FOLLOW UP ITEMS POST DISCHARGE  Follow with nephrologist and PCP    DISCHARGE DIAGNOSES  Principal Problem:    Acute pulmonary edema (HCC) (POA: Unknown)  Active Problems:    History of renal transplant (POA: Yes)      Overview:     History of atrial fibrillation (POA: Yes)    Type 2 diabetes mellitus (HCC) (POA: Yes)    Acute respiratory failure with hypoxia (HCC) (POA: Yes)    Immunosuppressed status (HCC) (POA: Yes)    Long term (current) use of systemic steroids (POA: Yes)    Acute renal failure superimposed on stage 3a chronic kidney disease, unspecified acute renal failure type (HCC) (POA: Yes)  Resolved Problems:    * No resolved hospital problems. *      FOLLOW UP  Future Appointments   Date Time Provider Department Center   6/21/2024  9:20 AM Ganesh Benton III, M.D. [facility]   6/25/2024  2:30 PM Ze Martins M.D. [address]   6/26/2024  9:45 AM Temidamon Cantu, PT [address] [place] [address]   6/27/2024  1:00 PM SHARON Salcedo CAR[facility] None   7/1/2024  8:15 AM Temidamon Cantu, PT [address] [place] [address]   7/3/2024  9:45 AM Temidamon Cantu, PT [address] [place] [address]   7/8/2024  9:00 AM Temi KARRI Dayker, PT [address] [place] [address]   7/10/2024  9:00 AM Temidamon Cantu, PT [address] [place] [address]   7/17/2024  9:45 AM Temidamon Cantu, PT [address] [place] [address]   7/22/2024  9:45 AM Temidamon Cantu, PT [address] [place] [address]     Ganesh Benton III, M.D.  [address] [facility]  [facility] [address]  [phone]            MEDICATIONS ON DISCHARGE     Medication List        START taking these medications        Instructions   BD Pen Needle Jayleen U/F  Generic drug: Insulin Pen Needle 32 G x 4 mm   Doctor's comments: Per patient/formulary  preference. ICD-10 code: E11.65 Uncontrolled type 2 Diabetes Mellitus  Use one pen needle in pen device to inject insulin once daily.     furosemide 40 MG Tabs  Commonly known as: Lasix   Take 1 Tablet by mouth every day.  Dose: 40 mg     Lantus SoloStar 100 UNIT/ML Sopn injection  Generic drug: insulin glargine   Inject 20 Units under the skin every evening.  Dose: 20 Units     metFORMIN 500 MG Tabs  Commonly known as: Glucophage   Take 1 Tablet by mouth 2 times a day with meals.  Dose: 500 mg            CHANGE how you take these medications        Instructions   aspirin 81 MG EC tablet  What changed: when to take this   Take 1 Tablet by mouth every day.  Dose: 81 mg     gabapentin 400 MG Caps  What changed:   how much to take  additional instructions  Commonly known as: Neurontin   Take 1 Capsule by mouth 2 times a day.  Dose: 400 mg            CONTINUE taking these medications        Instructions   acarbose 50 MG Tabs  Commonly known as: Precose   Doctor's comments: **Patient requests 90 days supply**  TAKE 1 TABLET BY MOUTH THREE TIMES DAILY WITH MEALS  Dose: 50 mg     atorvastatin 80 MG tablet  Commonly known as: Lipitor   Take 1 Tablet by mouth at bedtime.  Dose: 80 mg     benzonatate 200 MG capsule  Commonly known as: Tessalon   Take 1 Capsule by mouth 3 times a day as needed for Cough.  Dose: 200 mg     metoprolol SR 25 MG Tb24  Commonly known as: Toprol XL   Take 1 Tablet by mouth every day. May take additional one tab daily for heart rate sustaining >110 BPM.  Dose: 25 mg     metroNIDAZOLE 500 MG Tabs  Commonly known as: Flagyl   Crush 1-2 tablets and apply dirrectly into wound bed daily as needed for odor control.     omeprazole 20 MG delayed-release capsule  Commonly known as: PriLOSEC   Take 1 Capsule by mouth every day. Indications: Heartburn  Dose: 20 mg     predniSONE 10 MG Tabs  Commonly known as: Deltasone   Take 20 mg by mouth every day. 20 mg = 2 tabs  Dose: 20 mg     Sirolimus 2 MG Tabs   Take 4  mg by mouth every day. 4 mg = 2 tabs  Dose: 4 mg     Tradjenta 5 MG Tabs tablet  Generic drug: linagliptin   TAKE 1 TABLET BY MOUTH EVERY DAY  Dose: 5 mg            STOP taking these medications      glyBURIDE 5 MG Tabs  Commonly known as: Diabeta     pioglitazone 45 MG Tabs  Commonly known as: Actos              Allergies  Allergies   Allergen Reactions    Doxycycline Rash     Sweats and shakes: 9/28/17: Clarified allergy with patient. Allergy was in 1998 and he doesn't remember what happened. He thought the medication is for pain.  Tolerates doxycycline 9/2017       DIET  No orders of the defined types were placed in this encounter.      ACTIVITY  As tolerated.  Weight bearing as tolerated  NA  CONSULTATIONS  Nephrology    PROCEDURES  NA    LABORATORY  Lab Results   Component Value Date    SODIUM 136 06/20/2024    POTASSIUM 5.0 06/20/2024    CHLORIDE 98 06/20/2024    CO2 26 06/20/2024    GLUCOSE 214 (H) 06/20/2024    BUN 86 (H) 06/20/2024    CREATININE 2.61 (H) 06/20/2024    CREATININE 2.4 (H) 12/18/2006        Lab Results   Component Value Date    WBC 8.8 06/19/2024    HEMOGLOBIN 10.0 (L) 06/19/2024    HEMATOCRIT 32.6 (L) 06/19/2024    PLATELETCT 65 (L) 06/19/2024        Total time of the discharge process exceeds 35 minutes.

## 2024-06-20 NOTE — PROGRESS NOTES
Pt is able to tolerate walking around in the room with stand by assistance. NO calls from tele for afib RVR. Spouse is at bedside willing to learn and be involved in wound care. Pt is refusing heparin injections, bedtime blood sugar checks. Pt complains of back pain and axillary pain. Pt is continent of urine and stool. VSS. Call light in reach and fall precautions in place.

## 2024-06-20 NOTE — CARE PLAN
Problem: Pain - Standard  Goal: Alleviation of pain or a reduction in pain to the patient’s comfort goal  Outcome: Progressing   Will continue to manage pain using both non -pharmacological and pharmacological  methods.     problem: Skin Integrity - Diabetes  Goal: Patient's skin on legs and feet will remain intact while hospitalized  Outcome: Progressing   Will continue to educate and manage patient's diabetes via MAR.     Problem: Respiratory  Goal: Patient will achieve/maintain optimum respiratory ventilation and gas exchange  Outcome: Progressing  Patient was educated on how to use the IS, goal for the patient is 2500, however, patient was able to attained 1000. Will continue enhance IS use and patient was weaned off from 2L to 1L of oxygen.      The patient is Stable - Low risk of patient condition declining or worsening    Shift Goals  Clinical Goals: Mobility, Monitor labs, Wound care, sugar chceks  Patient Goals: d/c,  comfort and wound care  Family Goals: N/A    Progress made toward(s) clinical / shift goals:      Patient is not progressing towards the following goals:

## 2024-06-20 NOTE — PROGRESS NOTES
Telemetry Report  Rhythm afib   Heart Rate 84  Ectopy F PVC, Bi/trig R Coup    TN  QRS 0.131  QT              Per telemetry room monitor

## 2024-06-20 NOTE — DISCHARGE PLANNING
TCN following. HTH/SCP chart reviewed. No new TCN needs identified.  Noted patient remains ambulatory without AD, and on 1L supplemental O2. Please see prior TCN note from 6/18 for most recent discharge planning considerations if indicated.     Completed:  Choice obtained: SELAM, DAVE (O2 - [employer])  SCP with [facility] PCP. PCP f/u scheduled for 6/21

## 2024-06-20 NOTE — PROGRESS NOTES
[facility] -  PROGRESS NOTE               Author: Raphael Hua M.D. 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 Fajardo, DM with last A1c of 6.9%, who presented to hospital for SOB from outpatient nephrology clinic.  Pt also has orthopnea and significant lower extremity edema.  He is currently on sirolimus and pred for his transplant. Scr from 6/5 was 2.89 and his baseline is around 1.5-1.9. Nephrology was asked to consult for SANKET. Scr in the ER was 2.56. He states his SOB has been worsening over the past 3 weeks.      No F/C/N/V/CP/He does have SOB.  No melena, hematochezia, hematemesis.  No HA, visual changes, or abdominal pain. + edema bilateral LE    DAILY NEPHROLOGY SUMMARY:  6/12 - No new overnight renal events. Scr is 2.47 today. Diuresing well.   6/13 - Scr is stable. Good UOP. Feels better.   6/14 - Scr continues to head in the right direction. 2.34. HR 88-96 HR and then spikes to 160+ when standing or ambulating.    6/15 - No new overnight renal events. Afib , up to 160-170 when ambulating or standing. Scr is holding steady. Still SOB. Bilateral LE has improved significantly.  6/16 - No new overnight renal events. Diuresing well. Scr is down to 2.13.   6/17- No new labs,remains in AF, no c/o, denies feeling dizzy when he stands up   6/18: Cr unchanged from yesterday,2.5-2.6, BUN increasing, 1650cc UOP. VSS, feeling weak, poor stamina, attempted to walk down the simmons, desated and  heart rate increased 160    6/19: No acute events, 1600 cc UOP, CXR findings with patchy BL opacities, small BL pleural effusions, BP soft   6/20: Pt feeling much batter today, no complaints, VSS, partner at bed side     REVIEW OF SYSTEMS:    10 point ROS reviewed and is as per HPI/daily summary or otherwise  "negative    PMH/PSH/SH/FH:   Reviewed and unchanged since admission note    CURRENT MEDICATIONS:   Reviewed from admission to present day    VS:  BP 97/55   Pulse 60   Temp 36.5 °C (97.7 °F) (Temporal)   Resp 18   Ht 1.981 m (6' 6\")   Wt 97.1 kg (214 lb 1.1 oz)   SpO2 89%   BMI 24.74 kg/m²     Physical Exam  Vitals and nursing note reviewed.   Constitutional:       General: He is in acute distress.   HENT:      Head: Normocephalic and atraumatic.      Mouth/Throat:      Mouth: Mucous membranes are moist.   Eyes:      Conjunctiva/sclera: Conjunctivae normal.   Cardiovascular:      Rate and Rhythm: Normal rate.   Pulmonary:      Effort: Pulmonary effort is normal.      Comments: Coarse BS BL  Abdominal:      Palpations: Abdomen is soft.   Musculoskeletal:         General: Swelling present.      Comments: LUE lymphedema   RUE edema    Skin:     General: Skin is warm.   Neurological:      General: No focal deficit present.      Mental Status: He is oriented to person, place, and time.   Psychiatric:         Mood and Affect: Mood normal.         Fluids:  In: 287 [P.O.:287]  Out: 300     LABS:  Recent Labs     06/18/24  0223 06/19/24  0150 06/20/24  0043   SODIUM 136 132* 136   POTASSIUM 4.9 4.9 5.0   CHLORIDE 98 96 98   CO2 26 25 26   GLUCOSE 183* 254* 214*   BUN 77* 76* 86*   CREATININE 2.58* 2.40* 2.61*   CALCIUM 8.9 8.9 8.9       IMAGING:   All imaging reviewed from admission to present day    IMPRESSION:  # SANKET  - Baseline Scr is ~1.9  - Cr fluctuant 2.4-2.6 on recent labs      # Renal transplant  - Continue with home IS medications (Pred and Sirolimus)  # h/o ADPKD  # Mild hyperkalemia,resolved   # Acute pulmonary edema, HFrEF 45%  # PNA   # Acidosis, resolved   # Chronic Immunosuppression on medications (sirolimus and prednisone)  # Type 2 DM - last A1c was 6.9%  # Proteinuria  # Atrial fibrillation  # Vit D deficiency  # Squamous Cell Ca - getting immunotherapy/followed by Dr. Fajardo. He has mets to lymph " nodes., chronic lymphdema of LUE  complicated by L axillary abscess s/p drainage   # Anemia   # Thrombocytopenia   # Hypotension started midodrine 6/19   # Hypervolemic hyponatremia   - Improved      PLAN:  - There is no acute need for RRT.   - Creatinine overall stable He does not want dialysis if it comes to it.   - Ct PO furosemide 40 mg bid   - Low salt diet  - Continue with home IS medications  - Dose all meds per decreased renal function   - Avoid nephrotoxins   -  Increase Midodrine 10  mg tid   - Dose adjust gabapentin for renal function and also given low BP   - Rest of management per primary team

## 2024-06-20 NOTE — CARE PLAN
The patient is Stable - Low risk of patient condition declining or worsening    Shift Goals  Clinical Goals: MOnitor blood sugars, wound care, monitor heart rate, increase activity  Patient Goals: rest, adeuquate nutrition, limit interuptions  Family Goals: N/A    Progress made toward(s) clinical / shift goals:    Problem: Knowledge Deficit - Standard  Goal: Patient and family/care givers will demonstrate understanding of plan of care, disease process/condition, diagnostic tests and medications  Description: Target End Date:  1-3 days or as soon as patient condition allows    Document in Patient Education    1.  Patient and family/caregiver oriented to unit, equipment, visitation policy and means for communicating concern  2.  Complete/review Learning Assessment  3.  Assess knowledge level of disease process/condition, treatment plan, diagnostic tests and medications  4.  Explain disease process/condition, treatment plan, diagnostic tests and medications  Outcome: Progressing  Note: Pt is educated on diabetes management, wound care, importance of anticoagulants. pt is updated of plan of care at bedside shift report, pt's barriers to discharge are addressed. Pt's questions and concerns are addressed.      Problem: Pain - Standard  Goal: Alleviation of pain or a reduction in pain to the patient’s comfort goal  Description: Target End Date:  Prior to discharge or change in level of care    Document on Vitals flowsheet    1.  Document pain using the appropriate pain scale per order or unit policy  2.  Educate and implement non-pharmacologic comfort measures (i.e. relaxation, distraction, massage, cold/heat therapy, etc.)  3.  Pain management medications as ordered  4.  Reassess pain after pain med administration per policy  5.  If opiods administered assess patient's response to pain medication is appropriate per POSS sedation scale  6.  Follow pain management plan developed in collaboration with patient and  interdisciplinary team (including palliative care or pain specialists if applicable)  Outcome: Progressing  Note: Pt is assessed for pain location and intensity. Pt is treated per mar. Pt is reassessed for efficacy.        Patient is not progressing towards the following goals:      Problem: Skin Integrity  Goal: Skin integrity is maintained or improved  Description: Target End Date:  Prior to discharge or change in level of care    Document interventions on Skin Risk/Javad flowsheet groups and corresponding LDA    1.  Assess and monitor skin integrity, appearance and/or temperature  2.  Assess risk factors for impaired skin integrity and/or pressures ulcers  3.  Implement precautions to protect skin integrity in collaboration with interdisciplinary team  4.  Implement pressure ulcer prevention protocol if at risk for skin breakdown  5.  Confirm wound care consult if at risk for skin breakdown  6.  Ensure patient use of pressure relieving devices  (Low air loss bed, waffle overlay, heel protectors, ROHO cushion, etc)  Outcome: Not Progressing  Note: Wound care is completed to patient's specific preferences. Pt refused wound care per wound care orders. Pt is educated on importance of wound care.

## 2024-06-20 NOTE — PROGRESS NOTES
[facility] Internal Medicine Daily Progress Note    Date of Service  6/20/2024    R Team: R IM Green Team   Attending: Charlie Casey M.d.  Senior Resident: Dr. Betsy Muñoz  Intern:  Dr. Eliud Oleary  Contact Number: [phone]    Chief Complaint  Jama Altman is a 68 y.o. male admitted 6/11/2024 with leg swelling and dizziness    Hospital Course  Jama Altman is a 68 y.o. 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 CAD s/p stent, renal transplant 2010 on chronic immunosuppressant therapy, (hx of polycystic kidney disease), HLD, HTN and PAD who was admitted due to hypervolemia in the setting of acute on chronic kidney injury and acute hypoxic failure.    At ED,  saturating 94% at 2 L of oxygen but afebrile.  CMP was remarkable for elevated BUN of 80 and elevated creatinine of 2.59.  Chest x-ray presented hypoinflation with bibasilar atelectasis/pneumonitis and small pleural effusion without significant changes compared to 5/28/24 chest x-ray.  At ED, patient received IV Lasix of 60 mg.   Nephrology has been following and recommended diuresing.  Has been treating with antibiotics for possible pneumonia.  D-dimer was elevated. With acute kidney injury and history of Kidney transplant, V/Q scan was ordered but patient could not lie down flat more than 15 mins so V/Q scan got canceled. Patient agreed to start heparin to treat possible pulmonary embolism after discussion benefit and risk of heparin including bleeding risk. Cardiologist consulted for atrial fibrillation with RVR on exertion. Infectious disease was consulted and recommended 7 days of levofloxacin. Pulm was consulted, recommended discontinuing heparin (no concern for PE), recommended continuing Abx and diuresing. CT thorax w/o if his symptom is not improving.        Interval Problem Update  -  No overnight acute distress, vitals are stable, under 1 L oxygen.   - Patient has better walking test yesterday, only needs 2L oxygen during walking, HR was increased to 150-160 but not sustained, we increased metoprolol  to 100 mg bid today, but this morning patient's HR is still up to 190 when ambulation. We have re-consulted cardiology today to explore other options as we have reached the maximum dose of metoprolol.         I have discussed this patient's plan of care and discharge plan at IDT rounds today with Case Management, Nursing, Nursing leadership, and other members of the IDT team.    Consultants/Specialty  nephrology  Cardiology  Infectious disease  Pulmonology     Code Status  Full Code    Disposition  The patient is not medically cleared for discharge to home or a post-acute facility.      Review of Systems  Review of Systems   Constitutional:  Negative for chills and fever.   Respiratory:  Positive for cough, sputum production and shortness of breath. Negative for hemoptysis and wheezing.         Coughing and SOB has been improving   Cardiovascular:  Positive for orthopnea. Negative for chest pain and leg swelling.        Leg swelling improving   Gastrointestinal:  Negative for nausea and vomiting.   Neurological:  Positive for dizziness.        Dizziness when he walks        Physical Exam  Temp:  [35.9 °C (96.6 °F)-36.6 °C (97.8 °F)] 36.5 °C (97.7 °F)  Pulse:  [60-84] 60  Resp:  [16-18] 18  BP: ()/(55-70) 97/55  SpO2:  [89 %-95 %] 89 %    Physical Exam  Vitals reviewed.   Constitutional:       General: He is not in acute distress.     Appearance: He is not ill-appearing, toxic-appearing or diaphoretic.   HENT:      Head: Normocephalic and atraumatic.      Mouth/Throat:      Mouth: Mucous membranes are moist.      Pharynx: Oropharynx is clear. No oropharyngeal exudate.   Eyes:      Extraocular Movements: Extraocular movements intact.      Pupils: Pupils are equal, round, and reactive to light.    Cardiovascular:      Rate and Rhythm: Normal rate. Rhythm irregular.      Heart sounds: No murmur heard.  Pulmonary:      Effort: Pulmonary effort is normal. No respiratory distress.   Abdominal:      General: Abdomen is flat. There is no distension.      Palpations: Abdomen is soft.      Tenderness: There is no abdominal tenderness. There is no guarding or rebound.   Musculoskeletal:         General: Normal range of motion.      Cervical back: Normal range of motion. No rigidity.      Right lower leg: No edema.      Left lower leg: No edema.      Comments: Swelling on left upper extremity, patient stated its chronic  Swelling on the right forearm   Skin:     General: Skin is warm and dry.      Coloration: Skin is not jaundiced.      Findings: Lesion present.      Comments: lesion dressing applied on left axillary region  Metastatic cancer with necrosis   Neurological:      General: No focal deficit present.      Mental Status: He is alert and oriented to person, place, and time.      Cranial Nerves: No cranial nerve deficit.   Psychiatric:         Mood and Affect: Mood normal.         Behavior: Behavior normal.         Fluids    Intake/Output Summary (Last 24 hours) at 6/20/2024 1323  Last data filed at 6/20/2024 1200  Gross per 24 hour   Intake 287 ml   Output 301 ml   Net -14 ml       Laboratory  Recent Labs     06/19/24  0150   WBC 8.8   RBC 3.90*   HEMOGLOBIN 10.0*   HEMATOCRIT 32.6*   MCV 83.6   MCH 25.6*   MCHC 30.7*   RDW 49.8   PLATELETCT 65*   MPV 13.4*     Recent Labs     06/18/24  0223 06/19/24  0150 06/20/24  0043   SODIUM 136 132* 136   POTASSIUM 4.9 4.9 5.0   CHLORIDE 98 96 98   CO2 26 25 26   GLUCOSE 183* 254* 214*   BUN 77* 76* 86*   CREATININE 2.58* 2.40* 2.61*   CALCIUM 8.9 8.9 8.9                     Imaging  DX-CHEST-2 VIEWS   Final Result      No interval change in the radiographic appearance of the chest.      DX-CHEST-PORTABLE (1 VIEW)   Final Result         Small bilateral pleural  effusions, left more than right, with bibasilar atelectasis.      DX-CHEST-2 VIEWS   Final Result      1.  Stable cardiomegaly and interstitial edema.      2.  Stable bibasilar atelectasis/consolidation and bilateral pleural effusions.      DX-CHEST-PORTABLE (1 VIEW)   Final Result         1.  Pulmonary edema and/or infiltrates, increased since prior study.   2.  Moderate bilateral layering pleural effusions   3.  Cardiomegaly   4.  Atherosclerosis      DX-CHEST-2 VIEWS   Final Result      Small effusions with bibasilar atelectasis/infiltrate. There is also mildly increased right upper lobe infiltrate.      DX-CHEST-PORTABLE (1 VIEW)   Final Result      Hypoinflation with bibasilar atelectasis/pneumonitis and trace/small pleural effusions. No significant change.            Assessment/Plan  Problem Representation:    * Acute respiratory failure with hypoxia (HCC)- (present on admission)  Assessment & Plan  Patient has been endorsing shortness of breath and orthopnea.  Hypervolemic on assessment.  Requiring 2 L oxygen at ED.  Chest x-ray presented hypoinflation with bibasilar atelectasis/pneumonitis and small pleural effusion without significant changes compared to 5/28/24 chest x-ray. 2 view CXR with RUL infiltrate, procal elevated 0.4 on admission   Likely due to fluid overload from acute kidney injury, possible pneumonia (high risk due to immunosuppression), less likely acute on chronic heart failure. MRSA nares positive March    (6/13/24) increased oxygen requirement overnight.  Two-view chest x-ray was unremarkable compared to yesterday.  Repeat procalcitonin trending down to 0.34.   (6/14/24) Pro-Randy trending down 0.25.  D-dimer was elevated.  Discussed risk and benefit to start heparin to treat possible pulmonary embolism, patient agreed.  Started heparin  (6/15/24) still require 1.5 L of oxygen same as yesterday.  Discontinued Metolazone. ID switched to Levofloxacin  (6/16/24) Went up to 2L of O2. Pulm was  consulted, recommended discontinuing heparin (no concern for PE), recommended continuing Abx and diuresing. CT thorax w/o if his symptom is not improving.  Heparin discontinued  Heparin 6/14 - 16  (6/17/24) Patient feels improvement.   (6/18/24) Patient continue to feel improvement, but BUN increase, per nephrologist Lasix decreased to one dose per day.  Patient is not on obvious fluid overload status.Patient needs 6L oxygen when walking.   (6/19/24) Repeat 2-view CXR still showing fluid overload in the lung, lasix increased to bid 40mg per nephrologist.    Patient only needs 2L oxygen when walking.   (6/20/24) Patient only needs 1L oxygen at resting.   -Transition to oral Lasix 40 mg twice daily starting from 6/13/2024, decreased to 40 mg daily on 6/18/24, and resume to bid on 6/19  -Levofloxacin started 6/16/24 CAP, last dose 6/19  -Sputum culture presented a few gram-positive growth    Orthostatic dizziness  Assessment & Plan  Endorses worsening dizziness and lightheadedness when he is standing up from sitting up. Negative orthostatics by blood pressure but pulse does increase dramatically with ambulation  Has been tachycardic 160s by standing and walking.  (6/14) walking challenge presented patient only able to walk to the door and has significant dizziness with elevated heart rate 150s-160s.  (6/15) cardiologist consulted for A-fib RVR on exertion.  Recommended titrating metoprolol.  (6/16) HR labile 60 - 100s on resting  (6/17 and 6/18) atrial improves during resting but is still go to as high as 170-180 when ambulating, titration the metoprolol dose.    (6/19 and 6/20) Patient's walking HR is better but still up to 190 when ambulation,  Metoprolol has been increased to 100 mg bid   -Fall risk precaution  -Increase metoprolol SR to 100 mg bid on 6/20/24  -Re consulted Cardiology on 6/20/24    A-fib (HCC) s/p Watchman- (present on admission)  Assessment & Plan  - Same plan as orthostatic dizziness  - Telemetry  monitoring     Lymphedema on LUE  Assessment & Plan  Chronic lymphedema on left upper extremity  US LUE 5/4/24 presented No gross evidence of left upper extremity DVT or SVT.   - CTM    HFmrEF  Assessment & Plan  Echocardiogram 5/30/24 presented LVEF of 45% with moderate concentric left ventricular hypertrophy.  Possibly, acute respiratory failure secondary to cardiorenal syndrome or acute on chronic heart failure but less likely   -Same plan as hypervolemia    Hypervolemia  Assessment & Plan  Patient endorses 5 pounds weight gain past 3 weeks.  Has been following [facility].  Was sent to ED by recommended from nephrologist due to unresponsive p.o. Lasix.  Patient daily diuresis treatment, has lost 5.5 kg since admission.   - Nephrology consulted, appreciate recommendations  - IV Lasix 40 mg BID, transition to oral Lasix 6/13, decreased to 40 mg oral daily on 6/18 and increased to bid 40mg on 6/19  - Fluid restriction to 1L, low salt diet  - Daily standing weights  - Daily renal panel with mg    Acute on chronic renal insufficiency- (present on admission)  Assessment & Plan  Baseline creatinine 1.7 - 2.0  Nephrology consulted  -Same plan as hypervolemia    Stage 4 squamous cell cancer of left axillary region complicated by left axillary abcscess s/p drainage- (present on admission)  Assessment & Plan  Has been followed by outpatient oncologist.   Per patient, he is currently on immunotherapy and last therapy was on last Friday (6/7/24)  - Continue home prednisone and Sirolimus  - Wound consult placed  - Increased gabapentin to 200mg bid to control the pain on 6/20     Type 2 diabetes mellitus (HCC)- (present on admission)  Assessment & Plan  Hem A1c 11.2 (5/23/24)  Home glargine 20 units  -Increased glargine to 23 units on 6/17/24  -SSI  -Hypoglycemia protocol    GERD (gastroesophageal reflux disease)- (present on admission)  Assessment & Plan  - Continue home omeprazole    Coronary artery disease s/p  stent- (present on admission)  Assessment & Plan  -Continue home aspirin and atorvastatin    Hyperlipidemia- (present on admission)  Assessment & Plan  -Continue home atorvastatin         VTE prophylaxis: heparin ppx    I have performed a physical exam and reviewed and updated ROS and Plan today (6/20/2024). In review of yesterday's note (6/19/2024), there are no changes except as documented above.

## 2024-06-20 NOTE — CONSULTS
Cardiology Initial Consultation    Date of Service  6/20/2024    Referring Physician  Charlie Casey M.D.    Reason for Consultation  Atrial fibrillation with RVR with activity    History of Presenting Illness  Faraz Altman is a 68 y.o. male who presented 6/11/2024 with shortness of breath and lightheadedness from home outpatient nephrology clinic.    Patient was found to be hypoxic and in respiratory failure, SANKET with volume overload.  Nephrology was consulted to assist with diuresis.  Patient is treated for pneumonia.  Patient has had atrial fibrillation with RVR during this acute illness.  Cardiology was consulted and assisted with management of atrial fibrillation.    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 Fajardo.     Cardiology was re-consulted today for recommendations for rate control of atrial fibrillation.         Review of Systems  Review of Systems   Constitutional:  Positive for fatigue. Negative for chills, diaphoresis and fever.   HENT:  Negative for congestion.    Respiratory:  Positive for cough and shortness of breath. Negative for chest tightness and wheezing.    Cardiovascular:  Negative for chest pain, palpitations and leg swelling.   Gastrointestinal:  Negative for diarrhea.   Genitourinary:  Negative for hematuria.   Musculoskeletal: Negative.    Skin:         Wounds, bruising   Neurological:  Positive for weakness. Negative for dizziness, syncope, light-headedness and headaches.   Psychiatric/Behavioral: Negative.         Past Medical History   has a past medical history of A-fib (HCC), Arrhythmia, Benign essential hypertension, Diabetes (HCC), Heart burn, Hemorrhagic disorder (HCC), Hyperlipoproteinemia, Hypertension, Indigestion,  Myocardial infarct (HCC) (2013), Polycystic kidney (09/10/2010), Presence of Watchman left atrial appendage closure device (02/29/2024), Sleep apnea (01/30/2024), and Snoring.    He has no past medical history of Pain.    Surgical History   has a past surgical history that includes knee arthroplasty total (01/12/2007); knee arthroscopy (04/10/2006); other orthopedic surgery (07/08/1974); other (09/10/2010); knee arthroscopy (05/03/2011); meniscectomy, knee, medial (05/03/2011); knee unicompartmental (12/23/2011); knee arthroscopy (12/23/2011); knee manipulation (02/16/2012); and stent placement (2013).    Family History  family history is not on file.    Social History   reports that he has never smoked. He has never been exposed to tobacco smoke. He has never used smokeless tobacco. He reports that he does not drink alcohol and does not use drugs.    Medications  Prior to Admission Medications   Prescriptions Last Dose Informant Patient Reported? Taking?   Insulin Pen Needle 32 G x 4 mm N/A at N/A Patient No No   Sig: Use one pen needle in pen device to inject insulin once daily.   Sirolimus 2 MG Tab 6/11/2024 at AM Patient Yes Yes   Sig: Take 4 mg by mouth every day. 4 mg = 2 tabs   TRADJENTA 5 MG Tab tablet NO LONGER TAKING at NO LONGER TAKING Patient No No   Sig: TAKE 1 TABLET BY MOUTH EVERY DAY   Patient not taking: Reported on 6/10/2024   acarbose (PRECOSE) 50 MG Tab NO LONGER TAKIN at NO LONGER TAKING Patient No No   Sig: TAKE 1 TABLET BY MOUTH THREE TIMES DAILY WITH MEALS   acetaminophen-codeine #3 (TYLENOL #3) 300-30 MG Tab 6/10/2024 at PM Patient Yes Yes   Sig: Take 1 Tablet by mouth every evening.   aspirin 81 MG EC tablet 6/10/2024 at PM Patient No Yes   Sig: Take 1 Tablet by mouth every day.   Patient taking differently: Take 81 mg by mouth every evening. Indications: Buildup of Plaques in Large Arteries Leading to the Brain   atorvastatin (LIPITOR) 80 MG tablet 6/10/2024 at PM Patient No Yes   Sig:  Take 1 Tablet by mouth at bedtime.   benzonatate (TESSALON) 200 MG capsule 6/11/2024 at AM Patient No Yes   Sig: Take 1 Capsule by mouth 3 times a day as needed for Cough.   fluticasone-salmeterol (WIXELA INHUB) 250-50 MCG/ACT AEROSOL POWDER, BREATH ACTIVATED 6/11/2024 at AM Patient Yes Yes   Sig: Inhale 1 Puff every 12 hours.   furosemide (LASIX) 40 MG Tab 6/11/2024 at AM Patient No Yes   Sig: Take 1 Tablet by mouth every day.   gabapentin (NEURONTIN) 400 MG Cap 6/11/2024 at AM Patient No Yes   Sig: Take 1 Capsule by mouth 2 times a day.   Patient taking differently: Take 800 mg by mouth 2 times a day. 800 mg = 2 tabs   glyBURIDE (DIABETA) 5 MG Tab NO LONGER TAKING at NO LONGER TAKING Patient No No   Sig: TAKE 2 TABLETS BY MOUTH TWICE DAILY WITH MEALS   Patient not taking: Reported on 6/10/2024   insulin glargine 100 UNIT/ML Solution Pen-injector injection 6/10/2024 at PM Patient No Yes   Sig: Inject 20 Units under the skin every evening.   Patient taking differently: Inject 20-21 Units under the skin every evening.   levoFLOXacin (LEVAQUIN) 750 MG tablet 6/11/2024 at AM Patient Yes Yes   Sig: Take 750 mg by mouth every day. for 5 days   metFORMIN (GLUCOPHAGE) 500 MG Tab NO LONGER TAKING at NO LONGER TAKING Patient No No   Sig: Take 1 Tablet by mouth 2 times a day with meals.   Patient not taking: Reported on 6/10/2024   metoprolol SR (TOPROL XL) 25 MG TABLET SR 24 HR 6/11/2024 at AM Patient No Yes   Sig: Take 1 Tablet by mouth every day. May take additional one tab daily for heart rate sustaining >110 BPM.   metroNIDAZOLE (FLAGYL) 500 MG Tab 2 WEEKS AGO at PRN Patient No No   Sig: Crush 1-2 tablets and apply dirrectly into wound bed daily as needed for odor control.   omeprazole (PRILOSEC) 20 MG delayed-release capsule 6/11/2024 at AM Patient No Yes   Sig: Take 1 Capsule by mouth every day. Indications: Heartburn   pioglitazone (ACTOS) 45 MG Tab NO LONGER TAKING at NO LONGER TAKING Patient No No   Sig: TAKE ONE  TABLET BY MOUTH ONCE DAILY   Patient not taking: Reported on 6/10/2024   potassium chloride SA (K-DUR) 10 MEQ Tab CR 6/11/2024 at AM Patient No No   Sig: Take 1 Tablet by mouth every day.   predniSONE (DELTASONE) 10 MG Tab 6/11/2024 at AM Patient Yes Yes   Sig: Take 20 mg by mouth every day. 20 mg = 2 tabs      Facility-Administered Medications: None       Allergies  Allergies   Allergen Reactions    Doxycycline Rash     Sweats and shakes: 9/28/17: Clarified allergy with patient. Allergy was in 1998 and he doesn't remember what happened. He thought the medication is for pain.  Tolerates doxycycline 9/2017       Vital signs in last 24 hours  Temp:  [35.9 °C (96.6 °F)-36.6 °C (97.8 °F)] 36.5 °C (97.7 °F)  Pulse:  [] 191  Resp:  [16-18] 18  BP: ()/(55-70) 97/55  SpO2:  [89 %-95 %] 89 %    Physical Exam  Physical Exam  Constitutional:       General: He is not in acute distress.     Appearance: Normal appearance. He is well-developed.   HENT:      Head: Normocephalic.   Neck:      Vascular: No carotid bruit or JVD.   Cardiovascular:      Rate and Rhythm: Normal rate. Rhythm irregular.      Pulses: Normal pulses.      Heart sounds: Normal heart sounds.   Pulmonary:      Effort: Pulmonary effort is normal.      Breath sounds: Normal breath sounds.   Abdominal:      General: There is no distension.      Palpations: Abdomen is soft.   Musculoskeletal:         General: Swelling present. Normal range of motion.      Comments: extremity lymphadenopathy   Skin:     General: Skin is warm and dry.      Capillary Refill: Capillary refill takes less than 2 seconds.      Coloration: Skin is pale.      Findings: Bruising present.   Neurological:      Mental Status: He is oriented to person, place, and time.   Psychiatric:         Mood and Affect: Mood normal.         Behavior: Behavior normal.         Thought Content: Thought content normal.         Judgment: Judgment normal.         Lab Review  Lab Results   Component  "Value Date/Time    WBC 8.8 06/19/2024 01:50 AM    RBC 3.90 (L) 06/19/2024 01:50 AM    HEMOGLOBIN 10.0 (L) 06/19/2024 01:50 AM    HEMATOCRIT 32.6 (L) 06/19/2024 01:50 AM    MCV 83.6 06/19/2024 01:50 AM    MCH 25.6 (L) 06/19/2024 01:50 AM    MCHC 30.7 (L) 06/19/2024 01:50 AM    MPV 13.4 (H) 06/19/2024 01:50 AM      Lab Results   Component Value Date/Time    SODIUM 136 06/20/2024 12:43 AM    POTASSIUM 5.0 06/20/2024 12:43 AM    CHLORIDE 98 06/20/2024 12:43 AM    CO2 26 06/20/2024 12:43 AM    GLUCOSE 214 (H) 06/20/2024 12:43 AM    BUN 86 (H) 06/20/2024 12:43 AM    CREATININE 2.61 (H) 06/20/2024 12:43 AM    CREATININE 2.4 (H) 12/18/2006 06:20 AM      Lab Results   Component Value Date/Time    ASTSGOT 18 06/18/2024 02:23 AM    ALTSGPT 17 06/18/2024 02:23 AM     Lab Results   Component Value Date/Time    CHOLSTRLTOT 125 02/16/2024 08:14 AM    LDL 47 02/16/2024 08:14 AM    HDL 53 02/16/2024 08:14 AM    TRIGLYCERIDE 127 02/16/2024 08:14 AM    TROPONINT 89 (H) 05/29/2024 09:52 AM       No results for input(s): \"NTPROBNP\" in the last 72 hours.    Cardiac Imaging and Procedures Review  EKG:  My personal interpretation of the EKG dated 6/11/2024 is atrial fibrillation with right bundle branch block at rate 94      Echocardiogram 5/30/2024:    Compared to the prior study on 7/2/2022, there has no changes.  Moderate concentric left ventricular hypertrophy.   The left ventricular ejection fraction is visually estimated to be 45%.  No evidence of valvular abnormality based on Doppler evaluation.        Cardiac Catheterization 1/9/2024:  Successful PIPE closure    Imaging  Chest X-Ray: 6/19/2024  FINDINGS:  Lungs: Stable distribution of patchy bibasilar parenchymal opacities. Small bilateral pleural effusions are stable, greater on the left than the right. No visible pneumothorax.      Assessment/Plan  No new Assessment & Plan notes have been filed under this hospital service since the last note was generated.  Service: " Cardiology    #.  Atrial fibrillation with RVR   # History of permanent atrial fibrillation  #. RBBB  #. S/p watchman procedure 1/9/2024  #. SANKET on CKD 3  #. Hx of PCI  # prior renal transplant in 2010  # LVEF 45%, volume overload  #. Metastatic SCC on immunotherapy  #. DM  # Pneumonia    Recommendations:  -Atrial fibrillation, permanent, with rates well-controlled at rest, rates are in the 60s-80s.  Cardiology is reconsulted due to atrial fibrillation with the heart rate of 193 during mobility this morning.   I personally reviewed the telemetry for today's shift.  It appears that telemetry demonstrates a lot of artifact displaying the heart rate of 193 at 8 am  The underlying rate is 120 which was the true heart rate during mobility.  Telemetry strip will be uploaded to the media.  -Patient ambulated with me personally.  Telemetry is demonstrating atrial fibrillation with heart rates going into the 130's which is a reasonable rate given exertion during mobility.  Patient is asymptomatic during the walk, denies dizziness, lightheadedness, chest pain or pressure, or shortness of breath.  Patient returned to baseline atrial fibrillation with rates in the 60s-80s fairly quickly.   -Recommend current medical management for atrial fibrillation with metoprolol SR Toprol- mg twice daily (maximum dose is 200 mg/day).  -No need for oral anticoagulation, status post Watchman procedure  -Continue aspirin 81 mg daily and atorvastatin 80 mg nightly for goal LDL less than 70  -Optimize potassium above 4 and magnesium above 2  -Strict intake and output  -Continue furosemide 40 mg PO twice daily.  Managed by nephrology. Confirm with nephrology Dr Hua, okay to discharge with this dose.         Thank you for allowing me to participate in the care of this patient.    I will continue to follow this patient    Please contact me with any questions.    BITA Goldstein.   Cardiologist, [facility]  [phone]

## 2024-06-20 NOTE — FACE TO FACE
"Face to Face Note  -  Durable Medical Equipment    Eliud Oleary M.D. - NPI: [id number]  I certify that this patient is under my care and that they had a durable medical equipment(DME)face to face encounter by myself that meets the physician DME face-to-face encounter requirements with this patient on:    Date of encounter:   Patient:                    MRN:                       YOB: 2023  Jama Altman  [id number]  1955     The encounter with the patient was in whole, or in part, for the following medical condition, which is the primary reason for durable medical equipment:  Other - Acute hypoxia respiratory failure     I certify that, based on my findings, the following durable medical equipment is medically necessary:    Oxygen   HOME O2 Saturation Measurements:(Values must be present for Home Oxygen orders)  Room air sat at rest: 86  Room air sat with amb: 93 (HR - wnent up to 148)  With liters of O2: 2, O2 sat at rest with O2: 92 (HR -went up to 94)  With Liters of O2: 2, O2 sat with amb with O2 : 96 (and HR went up to 160)  Is the patient mobile?: Yes  If patient feels more short of breath, they can go up to 6 liters per minute and contact healthcare provider.    Supporting Symptoms: The patient requires supplemental oxygen, as the following interventions have been tried with limited or no improvement: \"Ambulation with oximetry and \"Incentive spirometry.    My Clinical findings support the need for the above equipment due to:  Hypoxia  "

## 2024-06-21 ENCOUNTER — PHARMACY VISIT (OUTPATIENT)
Dept: PHARMACY | Facility: MEDICAL CENTER | Age: 69
End: 2024-06-21
Payer: COMMERCIAL

## 2024-06-21 ENCOUNTER — APPOINTMENT (OUTPATIENT)
Dept: MEDICAL GROUP | Facility: PHYSICIAN GROUP | Age: 69
End: 2024-06-21
Payer: MEDICARE

## 2024-06-21 VITALS
TEMPERATURE: 97.7 F | BODY MASS INDEX: 24.77 KG/M2 | SYSTOLIC BLOOD PRESSURE: 100 MMHG | HEART RATE: 77 BPM | OXYGEN SATURATION: 91 % | HEIGHT: 78 IN | RESPIRATION RATE: 16 BRPM | WEIGHT: 214.07 LBS | DIASTOLIC BLOOD PRESSURE: 59 MMHG

## 2024-06-21 LAB
ANION GAP SERPL CALC-SCNC: 13 MMOL/L (ref 7–16)
BUN SERPL-MCNC: 92 MG/DL (ref 8–22)
CALCIUM SERPL-MCNC: 8.9 MG/DL (ref 8.5–10.5)
CHLORIDE SERPL-SCNC: 97 MMOL/L (ref 96–112)
CO2 SERPL-SCNC: 24 MMOL/L (ref 20–33)
CREAT SERPL-MCNC: 2.85 MG/DL (ref 0.5–1.4)
GFR SERPLBLD CREATININE-BSD FMLA CKD-EPI: 23 ML/MIN/1.73 M 2
GLUCOSE BLD STRIP.AUTO-MCNC: 150 MG/DL (ref 65–99)
GLUCOSE SERPL-MCNC: 226 MG/DL (ref 65–99)
MAGNESIUM SERPL-MCNC: 2.4 MG/DL (ref 1.5–2.5)
PHOSPHATE SERPL-MCNC: 3.7 MG/DL (ref 2.5–4.5)
POTASSIUM SERPL-SCNC: 5.2 MMOL/L (ref 3.6–5.5)
SODIUM SERPL-SCNC: 134 MMOL/L (ref 135–145)

## 2024-06-21 PROCEDURE — 80048 BASIC METABOLIC PNL TOTAL CA: CPT

## 2024-06-21 PROCEDURE — 700111 HCHG RX REV CODE 636 W/ 250 OVERRIDE (IP)

## 2024-06-21 PROCEDURE — 700102 HCHG RX REV CODE 250 W/ 637 OVERRIDE(OP)

## 2024-06-21 PROCEDURE — A9270 NON-COVERED ITEM OR SERVICE: HCPCS

## 2024-06-21 PROCEDURE — 700102 HCHG RX REV CODE 250 W/ 637 OVERRIDE(OP): Performed by: INTERNAL MEDICINE

## 2024-06-21 PROCEDURE — 51798 US URINE CAPACITY MEASURE: CPT

## 2024-06-21 PROCEDURE — A9270 NON-COVERED ITEM OR SERVICE: HCPCS | Performed by: INTERNAL MEDICINE

## 2024-06-21 PROCEDURE — 82962 GLUCOSE BLOOD TEST: CPT

## 2024-06-21 PROCEDURE — RXMED WILLOW AMBULATORY MEDICATION CHARGE

## 2024-06-21 PROCEDURE — 84100 ASSAY OF PHOSPHORUS: CPT

## 2024-06-21 PROCEDURE — 83735 ASSAY OF MAGNESIUM: CPT

## 2024-06-21 PROCEDURE — 99239 HOSP IP/OBS DSCHRG MGMT >30: CPT | Mod: GC | Performed by: INTERNAL MEDICINE

## 2024-06-21 RX ORDER — GABAPENTIN 100 MG/1
200 CAPSULE ORAL 2 TIMES DAILY
Qty: 120 CAPSULE | Refills: 0 | Status: SHIPPED | OUTPATIENT
Start: 2024-06-21 | End: 2024-07-21

## 2024-06-21 RX ORDER — MIDODRINE HYDROCHLORIDE 10 MG/1
10 TABLET ORAL
Qty: 90 TABLET | Refills: 0 | Status: SHIPPED | OUTPATIENT
Start: 2024-06-21 | End: 2024-07-21

## 2024-06-21 RX ORDER — FUROSEMIDE 40 MG/1
40 TABLET ORAL 2 TIMES DAILY
Qty: 60 TABLET | Refills: 0 | Status: SHIPPED | OUTPATIENT
Start: 2024-06-21 | End: 2024-07-21

## 2024-06-21 RX ORDER — METOPROLOL SUCCINATE 100 MG/1
100 TABLET, EXTENDED RELEASE ORAL 2 TIMES DAILY
Qty: 60 TABLET | Refills: 0 | Status: SHIPPED | OUTPATIENT
Start: 2024-06-21 | End: 2024-07-21

## 2024-06-21 RX ADMIN — PREDNISONE 20 MG: 20 TABLET ORAL at 06:00

## 2024-06-21 RX ADMIN — ACETAMINOPHEN 650 MG: 325 TABLET, FILM COATED ORAL at 13:52

## 2024-06-21 RX ADMIN — GABAPENTIN 200 MG: 100 CAPSULE ORAL at 06:00

## 2024-06-21 RX ADMIN — MIDODRINE HYDROCHLORIDE 10 MG: 5 TABLET ORAL at 12:54

## 2024-06-21 RX ADMIN — MIDODRINE HYDROCHLORIDE 10 MG: 5 TABLET ORAL at 10:26

## 2024-06-21 RX ADMIN — FUROSEMIDE 40 MG: 40 TABLET ORAL at 06:00

## 2024-06-21 RX ADMIN — ASPIRIN 81 MG: 81 TABLET, COATED ORAL at 06:00

## 2024-06-21 RX ADMIN — MOMETASONE FUROATE AND FORMOTEROL FUMARATE DIHYDRATE 2 PUFF: 200; 5 AEROSOL RESPIRATORY (INHALATION) at 06:05

## 2024-06-21 RX ADMIN — ACETAMINOPHEN 650 MG: 325 TABLET, FILM COATED ORAL at 06:32

## 2024-06-21 RX ADMIN — OMEPRAZOLE 20 MG: 20 CAPSULE, DELAYED RELEASE ORAL at 06:00

## 2024-06-21 RX ADMIN — METOPROLOL SUCCINATE 100 MG: 25 TABLET, EXTENDED RELEASE ORAL at 06:07

## 2024-06-21 RX ADMIN — GUAIFENESIN 600 MG: 600 TABLET, EXTENDED RELEASE ORAL at 06:00

## 2024-06-21 RX ADMIN — SIROLIMUS 4 MG: 0.5 TABLET ORAL at 06:01

## 2024-06-21 ASSESSMENT — PAIN DESCRIPTION - PAIN TYPE: TYPE: ACUTE PAIN;CHRONIC PAIN

## 2024-06-21 NOTE — DISCHARGE PLANNING
0804  TASHIA HARD FAXED REFERRAL :  Agency/Facility Name: [facility]   Fax#: [phone]  Time: [id number]  Agency/Facility Name: [facility]  Outcome: DPA received VM from Naun teresa [facility] advising they can complete discharge order and deliver Pt o2 at bedside shortly.  Advised LSW via person

## 2024-06-21 NOTE — PROGRESS NOTES
Pt is alert and oriented x4. Pt is ambulatory and continent. Pt had wife perform wound care with RN supervision at bedside last night, pt refuses to allow Rns to perform wound care to his specifications. Pt refusing heparin, pt refused dinner time insulin check. Pt complains of chronic back pain. Pt's heart rate is in afib, bigem, trigem, ranging from the low 40's to 190's with activity. Pt states that he is going home today, doctors approval or not.

## 2024-06-21 NOTE — PROGRESS NOTES
Monitor Summary    Rhythm: A FIB and  BBB      HR:  60-91      Ectopy: PVC - Couplets, Bigeminal and Trigeminal     -/.15/-

## 2024-06-21 NOTE — PROGRESS NOTES
[facility] -  PROGRESS NOTE               Author: Raphael Hua M.D. Date & Time: 6/21/2024  7:59 AM     HPI:  Patient is a 68 year old male with a PMHx of afib, CAD, renal transplant in 2010, PKD, HLD, HTN, and squamous cell carcinoma of the left axillary region on immunotherapy via Dr. Fajardo, DM with last A1c of 6.9%, who presented to hospital for SOB from outpatient nephrology clinic.  Pt also has orthopnea and significant lower extremity edema.  He is currently on sirolimus and pred for his transplant. Scr from 6/5 was 2.89 and his baseline is around 1.5-1.9. Nephrology was asked to consult for SANKET. Scr in the ER was 2.56. He states his SOB has been worsening over the past 3 weeks.      No F/C/N/V/CP/He does have SOB.  No melena, hematochezia, hematemesis.  No HA, visual changes, or abdominal pain. + edema bilateral LE    DAILY NEPHROLOGY SUMMARY:  6/12 - No new overnight renal events. Scr is 2.47 today. Diuresing well.   6/13 - Scr is stable. Good UOP. Feels better.   6/14 - Scr continues to head in the right direction. 2.34. HR 88-96 HR and then spikes to 160+ when standing or ambulating.    6/15 - No new overnight renal events. Afib , up to 160-170 when ambulating or standing. Scr is holding steady. Still SOB. Bilateral LE has improved significantly.  6/16 - No new overnight renal events. Diuresing well. Scr is down to 2.13.   6/17- No new labs,remains in AF, no c/o, denies feeling dizzy when he stands up   6/18: Cr unchanged from yesterday,2.5-2.6, BUN increasing, 1650cc UOP. VSS, feeling weak, poor stamina, attempted to walk down the simmons, desated and  heart rate increased 160    6/19: No acute events, 1600 cc UOP, CXR findings with patchy BL opacities, small BL pleural effusions, BP soft   6/20: Pt feeling much batter today, no complaints, VSS, partner at bed side   6/21: VSS, no acute events. I/O not recorded, no complaints, he is eager to go home     REVIEW OF  "SYSTEMS:    10 point ROS reviewed and is as per HPI/daily summary or otherwise negative    PMH/PSH/SH/FH:   Reviewed and unchanged since admission note    CURRENT MEDICATIONS:   Reviewed from admission to present day    VS:  BP 98/66   Pulse 73   Temp 36.7 °C (98.1 °F) (Temporal)   Resp 16   Ht 1.981 m (6' 6\")   Wt 97.1 kg (214 lb 1.1 oz)   SpO2 92%   BMI 24.74 kg/m²     Physical Exam  Vitals and nursing note reviewed.   Constitutional:       General: He is in acute distress.   HENT:      Head: Normocephalic and atraumatic.      Mouth/Throat:      Mouth: Mucous membranes are moist.   Eyes:      Conjunctiva/sclera: Conjunctivae normal.   Cardiovascular:      Rate and Rhythm: Normal rate.   Pulmonary:      Effort: Pulmonary effort is normal.      Comments: Coarse BS BL  Abdominal:      Palpations: Abdomen is soft.   Musculoskeletal:         General: Swelling present.      Comments: LUE lymphedema   RUE edema    Skin:     General: Skin is warm.   Neurological:      General: No focal deficit present.      Mental Status: He is oriented to person, place, and time.   Psychiatric:         Mood and Affect: Mood normal.         Fluids:  In: 0   Out: 1     LABS:  Recent Labs     06/19/24  0150 06/20/24  0043 06/21/24  0050   SODIUM 132* 136 134*   POTASSIUM 4.9 5.0 5.2   CHLORIDE 96 98 97   CO2 25 26 24   GLUCOSE 254* 214* 226*   BUN 76* 86* 92*   CREATININE 2.40* 2.61* 2.85*   CALCIUM 8.9 8.9 8.9       IMAGING:   All imaging reviewed from admission to present day    IMPRESSION:  # SANKET  - Baseline Scr is ~1.9  - Cr fluctuant 2.4-2.6 on recent labs , Cr slightly up from yesterday     # Renal transplant  - Continue with home IS medications (Pred and Sirolimus)  # h/o ADPKD  # Mild hyperkalemia,resolved   # Acute pulmonary edema, HFrEF 45%  # PNA   # Acidosis, resolved   # Chronic Immunosuppression on medications (sirolimus and prednisone)  # Type 2 DM - last A1c was 6.9%  # Atrial fibrillation  # Vit D deficiency  # " Squamous Cell Ca - getting immunotherapy/followed by Dr. Fajardo. He has mets to lymph nodes., chronic lymphdema of LUE  complicated by L axillary abscess s/p drainage   # Anemia   # Thrombocytopenia   # Hypotension started midodrine 6/19   # Hypervolemic hyponatremia   - Improved   # Hypoxic respiratory failure sec to pneumonia and volume overload      PLAN:  - There is no acute need for RRT.   - Slight increase Cr over the past several days - overall stable, ( previous nephrology note mention pt does not want dialysis if it comes to it , I clarified again with patient today long term goals in regard to his advanced CKD, he does not want to commit to any specific choice at this time.   - Ct PO furosemide 40 mg bid   - Low salt diet  - Continue with home IS medications  - Dose all meds per decreased renal function   - Avoid nephrotoxins   - Midodrine 10  mg tid   - Dose adjust gabapentin for renal function and also given low BP   - Labs early next week and will arrange for outpatient follow up in 1 week.   - Rest of management per primary team  Discussed with primary svc

## 2024-06-21 NOTE — DISCHARGE PLANNING
TCN following. HTH/SCP chart reviewed. No new TCN needs identified.  Noted patient remains ambulatory without AD, and on 1L supplemental O2. Note that FTF for supplemental 02 is now in chart as well. Anticipating that pt will dc to home with outpatient follow up v HH if indicated by medical team. Note that he remains ambulatory with no AD and 6 clicks is 19. Please see prior TCN note from 6/18 for most recent discharge planning considerations if indicated.      Completed:  Choice obtained: HH ([facility] if indicated; see above), DME (O2 - [facility])  SCP with [facility] PCP. PCP f/u scheduled for 6/21 -> sent to  as pt is still in acute setting for possible update for transitional follow up

## 2024-06-21 NOTE — CARE PLAN
The patient is Stable - Low risk of patient condition declining or worsening    Shift Goals  Clinical Goals: respiratory hygiene, O2 walk test, pain control, wound care  Patient Goals: rest, minimize interruptions, autonomy  Family Goals: JUDITH    Progress made toward(s) clinical / shift goals:    Problem: Knowledge Deficit - Standard  Goal: Patient and family/care givers will demonstrate understanding of plan of care, disease process/condition, diagnostic tests and medications  Description: Target End Date:  1-3 days or as soon as patient condition allows    Document in Patient Education    1.  Patient and family/caregiver oriented to unit, equipment, visitation policy and means for communicating concern  2.  Complete/review Learning Assessment  3.  Assess knowledge level of disease process/condition, treatment plan, diagnostic tests and medications  4.  Explain disease process/condition, treatment plan, diagnostic tests and medications  Outcome: Progressing  Note: Pt updated of plan of care at bedside shift report, pts barriers to discharge are addressed. Pts questions and concerns are addressed.        Patient is not progressing towards the following goals:      Problem: Skin Integrity  Goal: Skin integrity is maintained or improved  Description: Target End Date:  Prior to discharge or change in level of care    Document interventions on Skin Risk/Javad flowsheet groups and corresponding LDA    1.  Assess and monitor skin integrity, appearance and/or temperature  2.  Assess risk factors for impaired skin integrity and/or pressures ulcers  3.  Implement precautions to protect skin integrity in collaboration with interdisciplinary team  4.  Implement pressure ulcer prevention protocol if at risk for skin breakdown  5.  Confirm wound care consult if at risk for skin breakdown  6.  Ensure patient use of pressure relieving devices  (Low air loss bed, waffle overlay, heel protectors, ROHO cushion, etc)  Outcome: Not  Progressing  Note: Pt insists on having only spouse provide wound care and not per orders.

## 2024-06-21 NOTE — DISCHARGE INSTRUCTIONS
You will need to go to [facility] to get lab done on Monday, 6/24/24 to assess your kidney function. Your nephrologist will let you know if there will be any changes to your medications.

## 2024-06-24 ENCOUNTER — HOSPITAL ENCOUNTER (OUTPATIENT)
Dept: LAB | Facility: MEDICAL CENTER | Age: 69
End: 2024-06-24
Attending: INTERNAL MEDICINE
Payer: MEDICARE

## 2024-06-24 ENCOUNTER — PATIENT OUTREACH (OUTPATIENT)
Dept: MEDICAL GROUP | Facility: PHYSICIAN GROUP | Age: 69
End: 2024-06-24
Payer: MEDICARE

## 2024-06-24 ENCOUNTER — HOSPITAL ENCOUNTER (OUTPATIENT)
Dept: LAB | Facility: MEDICAL CENTER | Age: 69
End: 2024-06-24
Attending: NURSE PRACTITIONER
Payer: MEDICARE

## 2024-06-24 LAB
ALBUMIN SERPL BCP-MCNC: 3.2 G/DL (ref 3.2–4.9)
ALBUMIN/GLOB SERPL: 1.2 G/DL
ALP SERPL-CCNC: 85 U/L (ref 30–99)
ALT SERPL-CCNC: 23 U/L (ref 2–50)
ANION GAP SERPL CALC-SCNC: 16 MMOL/L (ref 7–16)
APPEARANCE UR: CLEAR
AST SERPL-CCNC: 29 U/L (ref 12–45)
BILIRUB SERPL-MCNC: 0.7 MG/DL (ref 0.1–1.5)
BILIRUB UR QL STRIP.AUTO: NEGATIVE
BUN SERPL-MCNC: 101 MG/DL (ref 8–22)
CALCIUM ALBUM COR SERPL-MCNC: 9.7 MG/DL (ref 8.5–10.5)
CALCIUM SERPL-MCNC: 9.1 MG/DL (ref 8.5–10.5)
CHLORIDE SERPL-SCNC: 92 MMOL/L (ref 96–112)
CO2 SERPL-SCNC: 24 MMOL/L (ref 20–33)
COLOR UR: YELLOW
CREAT SERPL-MCNC: 3.71 MG/DL (ref 0.5–1.4)
CREAT UR-MCNC: 156.11 MG/DL
GFR SERPLBLD CREATININE-BSD FMLA CKD-EPI: 17 ML/MIN/1.73 M 2
GLOBULIN SER CALC-MCNC: 2.6 G/DL (ref 1.9–3.5)
GLUCOSE SERPL-MCNC: 231 MG/DL (ref 65–99)
GLUCOSE UR STRIP.AUTO-MCNC: 100 MG/DL
KETONES UR STRIP.AUTO-MCNC: NEGATIVE MG/DL
LEUKOCYTE ESTERASE UR QL STRIP.AUTO: NEGATIVE
MAGNESIUM SERPL-MCNC: 2.6 MG/DL (ref 1.5–2.5)
MICRO URNS: ABNORMAL
NITRITE UR QL STRIP.AUTO: NEGATIVE
PH UR STRIP.AUTO: 5.5 [PH] (ref 5–8)
PHOSPHATE SERPL-MCNC: 5.4 MG/DL (ref 2.5–4.5)
POTASSIUM SERPL-SCNC: 6 MMOL/L (ref 3.6–5.5)
PROT SERPL-MCNC: 5.8 G/DL (ref 6–8.2)
PROT UR QL STRIP: NEGATIVE MG/DL
PROT UR-MCNC: 7 MG/DL (ref 0–15)
PROT/CREAT UR: 45 MG/G (ref 15–68)
RBC UR QL AUTO: NEGATIVE
SODIUM SERPL-SCNC: 132 MMOL/L (ref 135–145)
SP GR UR STRIP.AUTO: 1.02
URATE SERPL-MCNC: 13.3 MG/DL (ref 2.5–8.3)
UROBILINOGEN UR STRIP.AUTO-MCNC: 0.2 MG/DL

## 2024-06-24 PROCEDURE — 81003 URINALYSIS AUTO W/O SCOPE: CPT

## 2024-06-24 PROCEDURE — 80053 COMPREHEN METABOLIC PANEL: CPT

## 2024-06-24 PROCEDURE — 84156 ASSAY OF PROTEIN URINE: CPT

## 2024-06-24 PROCEDURE — 80195 ASSAY OF SIROLIMUS: CPT

## 2024-06-24 PROCEDURE — 83735 ASSAY OF MAGNESIUM: CPT

## 2024-06-24 PROCEDURE — 84550 ASSAY OF BLOOD/URIC ACID: CPT

## 2024-06-24 PROCEDURE — 85025 COMPLETE CBC W/AUTO DIFF WBC: CPT

## 2024-06-24 PROCEDURE — 84100 ASSAY OF PHOSPHORUS: CPT

## 2024-06-24 PROCEDURE — 36415 COLL VENOUS BLD VENIPUNCTURE: CPT

## 2024-06-24 PROCEDURE — 82570 ASSAY OF URINE CREATININE: CPT

## 2024-06-24 NOTE — PROGRESS NOTES
Transitional Care Management  TCM Outreach Date and Time: Filed (6/24/2024  9:40 AM)    Discharge Questions  Actual Discharge Date: 06/21/24  Now that you are home, how are you feeling?: Good  Did you receive any new prescriptions?: Yes  Were you able to get them filled?: Yes  Meds to Bed or Pharmacy filled?: Meds to Bed  Do you have any questions about your current medications or new medications (Review Med Rec)?: No  Did you have any durable medical equipment ordered?: No  Do you have a follow up appointment scheduled with your PCP?: Yes  Appointment Date: 06/28/24  Appointment Time: 1300  Any issues or paperwork you wish to discuss with your PCP?: No  Are you (patient) able to get to the appointment?: Yes  If Home Health was ordered, have they contacted you (Patient): Not Applicable  Did you have enough support after your last discharge?: Yes  Does this patient qualify for the CCM program?: Yes    Transitional Care  Number of attempts made to contact patient: 1  Current or previous attempts completed within two business days of discharge? : Yes  Provided education regarding treatment plan, medications, self-management, ADLs?: No  Has patient completed an Advanced Directive?: Yes  Is the patient's advanced directive on file?: No (If No, advise patient to bring a copy to appointment.)  Has the Care Manager's phone number provided?: No  Is there anything else I can help you with?: Yes (Please specify) (pt wanted to confirm pt appt.)    Discharge Summary  Chief Complaint: lightheadedness  Admitting Diagnosis: shortness of breath  Discharge Diagnosis: Acute respiratory failure with hypoxia

## 2024-06-25 ENCOUNTER — HOSPITAL ENCOUNTER (INPATIENT)
Facility: MEDICAL CENTER | Age: 69
DRG: 698 | End: 2024-06-25
Attending: EMERGENCY MEDICINE | Admitting: INTERNAL MEDICINE
Payer: MEDICARE

## 2024-06-25 ENCOUNTER — OFFICE VISIT (OUTPATIENT)
Dept: WOUND CARE | Facility: MEDICAL CENTER | Age: 69
End: 2024-06-25
Attending: NURSE PRACTITIONER
Payer: MEDICARE

## 2024-06-25 VITALS
TEMPERATURE: 96.6 F | OXYGEN SATURATION: 91 % | DIASTOLIC BLOOD PRESSURE: 52 MMHG | RESPIRATION RATE: 18 BRPM | SYSTOLIC BLOOD PRESSURE: 109 MMHG | HEART RATE: 86 BPM

## 2024-06-25 DIAGNOSIS — I89.0 LYMPHEDEMA OF LEFT ARM: ICD-10-CM

## 2024-06-25 DIAGNOSIS — R09.02 HYPOXIA: ICD-10-CM

## 2024-06-25 DIAGNOSIS — J90 CHRONIC BILATERAL PLEURAL EFFUSIONS: ICD-10-CM

## 2024-06-25 DIAGNOSIS — E87.5 HYPERKALEMIA: ICD-10-CM

## 2024-06-25 DIAGNOSIS — C44.629 SQUAMOUS CELL CARCINOMA OF LEFT UPPER EXTREMITY: ICD-10-CM

## 2024-06-25 DIAGNOSIS — J90 PLEURAL EFFUSION: ICD-10-CM

## 2024-06-25 DIAGNOSIS — L02.412 ABSCESS OF AXILLA, LEFT: ICD-10-CM

## 2024-06-25 DIAGNOSIS — S51.802A OPEN WOUND OF LEFT FOREARM, INITIAL ENCOUNTER: ICD-10-CM

## 2024-06-25 DIAGNOSIS — C44.629 SQUAMOUS CELL CARCINOMA, ARM, LEFT: ICD-10-CM

## 2024-06-25 DIAGNOSIS — Z94.0 HISTORY OF RENAL TRANSPLANT: ICD-10-CM

## 2024-06-25 DIAGNOSIS — N17.9 AKI (ACUTE KIDNEY INJURY) (HCC): ICD-10-CM

## 2024-06-25 DIAGNOSIS — S41.102A OPEN WOUND OF LEFT AXILLARY REGION, INITIAL ENCOUNTER: ICD-10-CM

## 2024-06-25 DIAGNOSIS — D84.9 IMMUNOSUPPRESSED STATUS (HCC): ICD-10-CM

## 2024-06-25 DIAGNOSIS — I95.89 CHRONIC HYPOTENSION: ICD-10-CM

## 2024-06-25 PROBLEM — I95.9 HYPOTENSION: Status: ACTIVE | Noted: 2024-01-01

## 2024-06-25 LAB
ALBUMIN SERPL BCP-MCNC: 3 G/DL (ref 3.2–4.9)
ALBUMIN/GLOB SERPL: 1.1 G/DL
ALP SERPL-CCNC: 87 U/L (ref 30–99)
ALT SERPL-CCNC: 22 U/L (ref 2–50)
ANION GAP SERPL CALC-SCNC: 15 MMOL/L (ref 7–16)
AST SERPL-CCNC: 24 U/L (ref 12–45)
BASOPHILS # BLD AUTO: 0.2 % (ref 0–1.8)
BASOPHILS # BLD: 0.02 K/UL (ref 0–0.12)
BILIRUB SERPL-MCNC: 0.7 MG/DL (ref 0.1–1.5)
BUN SERPL-MCNC: 114 MG/DL (ref 8–22)
CALCIUM ALBUM COR SERPL-MCNC: 9.9 MG/DL (ref 8.5–10.5)
CALCIUM SERPL-MCNC: 9.1 MG/DL (ref 8.5–10.5)
CHLORIDE SERPL-SCNC: 94 MMOL/L (ref 96–112)
CK SERPL-CCNC: 178 U/L (ref 0–154)
CK SERPL-CCNC: 181 U/L (ref 0–154)
CO2 SERPL-SCNC: 21 MMOL/L (ref 20–33)
CREAT SERPL-MCNC: 3.68 MG/DL (ref 0.5–1.4)
EKG IMPRESSION: NORMAL
EOSINOPHIL # BLD AUTO: 0 K/UL (ref 0–0.51)
EOSINOPHIL NFR BLD: 0 % (ref 0–6.9)
ERYTHROCYTE [DISTWIDTH] IN BLOOD BY AUTOMATED COUNT: 50.8 FL (ref 35.9–50)
GFR SERPLBLD CREATININE-BSD FMLA CKD-EPI: 17 ML/MIN/1.73 M 2
GLOBULIN SER CALC-MCNC: 2.7 G/DL (ref 1.9–3.5)
GLUCOSE BLD STRIP.AUTO-MCNC: 274 MG/DL (ref 65–99)
GLUCOSE BLD STRIP.AUTO-MCNC: 390 MG/DL (ref 65–99)
GLUCOSE SERPL-MCNC: 362 MG/DL (ref 65–99)
HCT VFR BLD AUTO: 34.4 % (ref 42–52)
HGB BLD-MCNC: 10.5 G/DL (ref 14–18)
IMM GRANULOCYTES # BLD AUTO: 0.26 K/UL (ref 0–0.11)
IMM GRANULOCYTES NFR BLD AUTO: 2 % (ref 0–0.9)
LYMPHOCYTES # BLD AUTO: 0.12 K/UL (ref 1–4.8)
LYMPHOCYTES NFR BLD: 0.9 % (ref 22–41)
MCH RBC QN AUTO: 25.6 PG (ref 27–33)
MCHC RBC AUTO-ENTMCNC: 30.5 G/DL (ref 32.3–36.5)
MCV RBC AUTO: 83.9 FL (ref 81.4–97.8)
MONOCYTES # BLD AUTO: 0.36 K/UL (ref 0–0.85)
MONOCYTES NFR BLD AUTO: 2.8 % (ref 0–13.4)
NEUTROPHILS # BLD AUTO: 12.22 K/UL (ref 1.82–7.42)
NEUTROPHILS NFR BLD: 94.1 % (ref 44–72)
NRBC # BLD AUTO: 0.03 K/UL
NRBC BLD-RTO: 0.2 /100 WBC (ref 0–0.2)
PHOSPHATE SERPL-MCNC: 5.2 MG/DL (ref 2.5–4.5)
PLATELET # BLD AUTO: 96 K/UL (ref 164–446)
PLATELETS.RETICULATED NFR BLD AUTO: 15.2 % (ref 0.6–13.1)
POTASSIUM SERPL-SCNC: 6.5 MMOL/L (ref 3.6–5.5)
PROT SERPL-MCNC: 5.7 G/DL (ref 6–8.2)
RBC # BLD AUTO: 4.1 M/UL (ref 4.7–6.1)
SIROLIMUS BLD-MCNC: 20.6 NG/ML
SODIUM SERPL-SCNC: 130 MMOL/L (ref 135–145)
URATE SERPL-MCNC: 13.1 MG/DL (ref 2.5–8.3)
URATE SERPL-MCNC: 13.2 MG/DL (ref 2.5–8.3)
WBC # BLD AUTO: 13 K/UL (ref 4.8–10.8)

## 2024-06-25 PROCEDURE — 99213 OFFICE O/P EST LOW 20 MIN: CPT

## 2024-06-25 PROCEDURE — 82962 GLUCOSE BLOOD TEST: CPT

## 2024-06-25 PROCEDURE — A9270 NON-COVERED ITEM OR SERVICE: HCPCS | Performed by: EMERGENCY MEDICINE

## 2024-06-25 PROCEDURE — 85055 RETICULATED PLATELET ASSAY: CPT

## 2024-06-25 PROCEDURE — A9270 NON-COVERED ITEM OR SERVICE: HCPCS | Performed by: INTERNAL MEDICINE

## 2024-06-25 PROCEDURE — 3074F SYST BP LT 130 MM HG: CPT | Performed by: STUDENT IN AN ORGANIZED HEALTH CARE EDUCATION/TRAINING PROGRAM

## 2024-06-25 PROCEDURE — 700111 HCHG RX REV CODE 636 W/ 250 OVERRIDE (IP): Mod: JZ | Performed by: EMERGENCY MEDICINE

## 2024-06-25 PROCEDURE — 3078F DIAST BP <80 MM HG: CPT | Performed by: STUDENT IN AN ORGANIZED HEALTH CARE EDUCATION/TRAINING PROGRAM

## 2024-06-25 PROCEDURE — 99285 EMERGENCY DEPT VISIT HI MDM: CPT

## 2024-06-25 PROCEDURE — 96375 TX/PRO/DX INJ NEW DRUG ADDON: CPT

## 2024-06-25 PROCEDURE — 85025 COMPLETE CBC W/AUTO DIFF WBC: CPT

## 2024-06-25 PROCEDURE — 36415 COLL VENOUS BLD VENIPUNCTURE: CPT

## 2024-06-25 PROCEDURE — 87449 NOS EACH ORGANISM AG IA: CPT

## 2024-06-25 PROCEDURE — 700111 HCHG RX REV CODE 636 W/ 250 OVERRIDE (IP): Mod: JZ | Performed by: INTERNAL MEDICINE

## 2024-06-25 PROCEDURE — 80053 COMPREHEN METABOLIC PANEL: CPT

## 2024-06-25 PROCEDURE — 84100 ASSAY OF PHOSPHORUS: CPT

## 2024-06-25 PROCEDURE — 99223 1ST HOSP IP/OBS HIGH 75: CPT | Mod: AI | Performed by: INTERNAL MEDICINE

## 2024-06-25 PROCEDURE — 700102 HCHG RX REV CODE 250 W/ 637 OVERRIDE(OP): Performed by: INTERNAL MEDICINE

## 2024-06-25 PROCEDURE — 96365 THER/PROPH/DIAG IV INF INIT: CPT

## 2024-06-25 PROCEDURE — 87040 BLOOD CULTURE FOR BACTERIA: CPT

## 2024-06-25 PROCEDURE — 93005 ELECTROCARDIOGRAM TRACING: CPT | Performed by: EMERGENCY MEDICINE

## 2024-06-25 PROCEDURE — 770000 HCHG ROOM/CARE - INTERMEDIATE ICU *

## 2024-06-25 PROCEDURE — 99213 OFFICE O/P EST LOW 20 MIN: CPT | Performed by: STUDENT IN AN ORGANIZED HEALTH CARE EDUCATION/TRAINING PROGRAM

## 2024-06-25 PROCEDURE — 84550 ASSAY OF BLOOD/URIC ACID: CPT

## 2024-06-25 PROCEDURE — 82550 ASSAY OF CK (CPK): CPT

## 2024-06-25 PROCEDURE — 700101 HCHG RX REV CODE 250: Performed by: INTERNAL MEDICINE

## 2024-06-25 PROCEDURE — 700102 HCHG RX REV CODE 250 W/ 637 OVERRIDE(OP): Performed by: EMERGENCY MEDICINE

## 2024-06-25 RX ORDER — DEXTROSE MONOHYDRATE 25 G/50ML
25 INJECTION, SOLUTION INTRAVENOUS
Status: DISCONTINUED | OUTPATIENT
Start: 2024-06-25 | End: 2024-06-26

## 2024-06-25 RX ORDER — AMOXICILLIN 250 MG
2 CAPSULE ORAL EVERY EVENING
Status: DISCONTINUED | OUTPATIENT
Start: 2024-06-25 | End: 2024-07-01

## 2024-06-25 RX ORDER — MIDODRINE HYDROCHLORIDE 5 MG/1
5 TABLET ORAL ONCE
Status: COMPLETED | OUTPATIENT
Start: 2024-06-25 | End: 2024-06-25

## 2024-06-25 RX ORDER — ATORVASTATIN CALCIUM 40 MG/1
80 TABLET, FILM COATED ORAL
Status: DISCONTINUED | OUTPATIENT
Start: 2024-06-25 | End: 2024-07-01

## 2024-06-25 RX ORDER — SODIUM BICARBONATE IN D5W 150/1000ML
PLASTIC BAG, INJECTION (ML) INTRAVENOUS CONTINUOUS
Status: DISCONTINUED | OUTPATIENT
Start: 2024-06-25 | End: 2024-06-25

## 2024-06-25 RX ORDER — OXYCODONE HYDROCHLORIDE 5 MG/1
5 TABLET ORAL
Status: DISCONTINUED | OUTPATIENT
Start: 2024-06-25 | End: 2024-06-30

## 2024-06-25 RX ORDER — PREDNISONE 20 MG/1
20 TABLET ORAL DAILY
Status: DISCONTINUED | OUTPATIENT
Start: 2024-06-26 | End: 2024-06-25

## 2024-06-25 RX ORDER — ACETAMINOPHEN AND CODEINE PHOSPHATE 300; 30 MG/1; MG/1
1 TABLET ORAL NIGHTLY
Status: DISCONTINUED | OUTPATIENT
Start: 2024-06-25 | End: 2024-06-25

## 2024-06-25 RX ORDER — CALCIUM CHLORIDE 100 MG/ML
1 INJECTION INTRAVENOUS; INTRAVENTRICULAR ONCE
Status: COMPLETED | OUTPATIENT
Start: 2024-06-25 | End: 2024-06-25

## 2024-06-25 RX ORDER — HYDROMORPHONE HYDROCHLORIDE 1 MG/ML
0.25 INJECTION, SOLUTION INTRAMUSCULAR; INTRAVENOUS; SUBCUTANEOUS
Status: DISCONTINUED | OUTPATIENT
Start: 2024-06-25 | End: 2024-06-30

## 2024-06-25 RX ORDER — OMEPRAZOLE 20 MG/1
20 CAPSULE, DELAYED RELEASE ORAL DAILY
Status: DISCONTINUED | OUTPATIENT
Start: 2024-06-26 | End: 2024-07-01

## 2024-06-25 RX ORDER — ONDANSETRON 2 MG/ML
4 INJECTION INTRAMUSCULAR; INTRAVENOUS ONCE
Status: COMPLETED | OUTPATIENT
Start: 2024-06-25 | End: 2024-06-25

## 2024-06-25 RX ORDER — MORPHINE SULFATE 4 MG/ML
4 INJECTION INTRAVENOUS ONCE
Status: COMPLETED | OUTPATIENT
Start: 2024-06-25 | End: 2024-06-25

## 2024-06-25 RX ORDER — ACETAMINOPHEN 325 MG/1
650 TABLET ORAL EVERY 6 HOURS PRN
Status: DISCONTINUED | OUTPATIENT
Start: 2024-06-25 | End: 2024-07-01

## 2024-06-25 RX ORDER — FUROSEMIDE 10 MG/ML
40 INJECTION INTRAMUSCULAR; INTRAVENOUS ONCE
Status: COMPLETED | OUTPATIENT
Start: 2024-06-25 | End: 2024-06-25

## 2024-06-25 RX ORDER — ASPIRIN 81 MG/1
81 TABLET ORAL EVERY EVENING
Status: DISCONTINUED | OUTPATIENT
Start: 2024-06-25 | End: 2024-07-01

## 2024-06-25 RX ORDER — MIDODRINE HYDROCHLORIDE 5 MG/1
10 TABLET ORAL
Status: DISCONTINUED | OUTPATIENT
Start: 2024-06-25 | End: 2024-06-28

## 2024-06-25 RX ORDER — ONDANSETRON 2 MG/ML
4 INJECTION INTRAMUSCULAR; INTRAVENOUS EVERY 4 HOURS PRN
Status: DISCONTINUED | OUTPATIENT
Start: 2024-06-25 | End: 2024-07-01 | Stop reason: HOSPADM

## 2024-06-25 RX ORDER — SIROLIMUS 0.5 MG/1
4 TABLET, FILM COATED ORAL DAILY
Status: DISCONTINUED | OUTPATIENT
Start: 2024-06-26 | End: 2024-07-01

## 2024-06-25 RX ORDER — POLYETHYLENE GLYCOL 3350 17 G/17G
1 POWDER, FOR SOLUTION ORAL
Status: DISCONTINUED | OUTPATIENT
Start: 2024-06-25 | End: 2024-07-01

## 2024-06-25 RX ORDER — HEPARIN SODIUM 5000 [USP'U]/ML
5000 INJECTION, SOLUTION INTRAVENOUS; SUBCUTANEOUS EVERY 8 HOURS
Status: DISCONTINUED | OUTPATIENT
Start: 2024-06-25 | End: 2024-06-26

## 2024-06-25 RX ORDER — ONDANSETRON 4 MG/1
4 TABLET, ORALLY DISINTEGRATING ORAL EVERY 4 HOURS PRN
Status: DISCONTINUED | OUTPATIENT
Start: 2024-06-25 | End: 2024-07-01 | Stop reason: HOSPADM

## 2024-06-25 RX ORDER — GABAPENTIN 100 MG/1
200 CAPSULE ORAL 2 TIMES DAILY
Status: DISCONTINUED | OUTPATIENT
Start: 2024-06-25 | End: 2024-07-01

## 2024-06-25 RX ORDER — DEXTROSE MONOHYDRATE 25 G/50ML
25 INJECTION, SOLUTION INTRAVENOUS ONCE
Status: DISCONTINUED | OUTPATIENT
Start: 2024-06-25 | End: 2024-06-25

## 2024-06-25 RX ORDER — OXYCODONE HYDROCHLORIDE 5 MG/1
2.5 TABLET ORAL
Status: DISCONTINUED | OUTPATIENT
Start: 2024-06-25 | End: 2024-06-30

## 2024-06-25 RX ADMIN — ATORVASTATIN CALCIUM 80 MG: 80 TABLET, FILM COATED ORAL at 23:48

## 2024-06-25 RX ADMIN — INSULIN HUMAN 5 UNITS: 100 INJECTION, SOLUTION PARENTERAL at 20:43

## 2024-06-25 RX ADMIN — SODIUM BICARBONATE 50 MEQ: 84 INJECTION INTRAVENOUS at 20:40

## 2024-06-25 RX ADMIN — FUROSEMIDE 40 MG: 10 INJECTION, SOLUTION INTRAMUSCULAR; INTRAVENOUS at 20:40

## 2024-06-25 RX ADMIN — HYDROCORTISONE SODIUM SUCCINATE 50 MG: 100 INJECTION, POWDER, FOR SOLUTION INTRAMUSCULAR; INTRAVENOUS at 23:48

## 2024-06-25 RX ADMIN — MORPHINE SULFATE 4 MG: 4 INJECTION INTRAVENOUS at 20:04

## 2024-06-25 RX ADMIN — ASPIRIN 81 MG: 81 TABLET, COATED ORAL at 23:48

## 2024-06-25 RX ADMIN — MOMETASONE FUROATE AND FORMOTEROL FUMARATE DIHYDRATE 1 PUFF: 200; 5 AEROSOL RESPIRATORY (INHALATION) at 23:49

## 2024-06-25 RX ADMIN — INSULIN GLARGINE-YFGN 20 UNITS: 100 INJECTION, SOLUTION SUBCUTANEOUS at 23:54

## 2024-06-25 RX ADMIN — SODIUM BICARBONATE 150 MEQ: 84 INJECTION, SOLUTION INTRAVENOUS at 21:32

## 2024-06-25 RX ADMIN — GABAPENTIN 200 MG: 100 CAPSULE ORAL at 23:47

## 2024-06-25 RX ADMIN — CALCIUM CHLORIDE 1 G: 100 INJECTION, SOLUTION INTRAVENOUS at 21:32

## 2024-06-25 RX ADMIN — MIDODRINE HYDROCHLORIDE 5 MG: 5 TABLET ORAL at 20:00

## 2024-06-25 RX ADMIN — ONDANSETRON 4 MG: 2 INJECTION INTRAMUSCULAR; INTRAVENOUS at 20:04

## 2024-06-25 ASSESSMENT — ENCOUNTER SYMPTOMS
CHILLS: 0
BRUISES/BLEEDS EASILY: 0
FEVER: 0
COUGH: 0
TREMORS: 0
PALPITATIONS: 0
SPUTUM PRODUCTION: 0
WEIGHT LOSS: 0
NERVOUS/ANXIOUS: 0
NAUSEA: 0
WEAKNESS: 1
HEADACHES: 0
FLANK PAIN: 0
POLYDIPSIA: 0
HEARTBURN: 0
VOMITING: 0
BLURRED VISION: 0
HALLUCINATIONS: 0
ORTHOPNEA: 0
HEMOPTYSIS: 0
BACK PAIN: 0
DOUBLE VISION: 0
PHOTOPHOBIA: 0
FOCAL WEAKNESS: 0
SPEECH CHANGE: 0
NECK PAIN: 0

## 2024-06-25 ASSESSMENT — FIBROSIS 4 INDEX
FIB4 SCORE: 6.23
FIB4 SCORE: 3.57

## 2024-06-25 ASSESSMENT — PAIN DESCRIPTION - PAIN TYPE: TYPE: ACUTE PAIN

## 2024-06-25 ASSESSMENT — LIFESTYLE VARIABLES: SUBSTANCE_ABUSE: 0

## 2024-06-25 NOTE — PATIENT INSTRUCTIONS
-Keep your wound dressing clean, dry, and intact.     -Should you experience any significant changes in your wound(s), such as infection (redness, swelling, localized heat, increased pain, fever > 101 F, chills) or have any questions regarding your home care instructions, please contact the wound center at [phone]. If after hours, contact your primary care physician or go to the hospital emergency room.

## 2024-06-26 ENCOUNTER — APPOINTMENT (OUTPATIENT)
Dept: RADIOLOGY | Facility: MEDICAL CENTER | Age: 69
DRG: 698 | End: 2024-06-26
Attending: INTERNAL MEDICINE
Payer: MEDICARE

## 2024-06-26 LAB
ALBUMIN SERPL BCP-MCNC: 2.8 G/DL (ref 3.2–4.9)
ALBUMIN/GLOB SERPL: 1.1 G/DL
ALP SERPL-CCNC: 75 U/L (ref 30–99)
ALT SERPL-CCNC: 20 U/L (ref 2–50)
AMYLASE FLD-CCNC: 35 U/L
ANION GAP SERPL CALC-SCNC: 13 MMOL/L (ref 7–16)
APPEARANCE FLD: CLEAR
AST SERPL-CCNC: 20 U/L (ref 12–45)
BASOPHILS # BLD AUTO: 0.3 % (ref 0–1.8)
BASOPHILS # BLD: 0.03 K/UL (ref 0–0.12)
BILIRUB SERPL-MCNC: 0.8 MG/DL (ref 0.1–1.5)
BODY FLD TYPE: NORMAL
BUN SERPL-MCNC: 109 MG/DL (ref 8–22)
CALCIUM ALBUM COR SERPL-MCNC: 9.9 MG/DL (ref 8.5–10.5)
CALCIUM SERPL-MCNC: 8.9 MG/DL (ref 8.5–10.5)
CELLS FLD: 17
CHLORIDE SERPL-SCNC: 92 MMOL/L (ref 96–112)
CO2 SERPL-SCNC: 26 MMOL/L (ref 20–33)
COLOR FLD: YELLOW
CREAT SERPL-MCNC: 3.46 MG/DL (ref 0.5–1.4)
CYTOLOGY REG CYTOL: NORMAL
EOSINOPHIL # BLD AUTO: 0 K/UL (ref 0–0.51)
EOSINOPHIL NFR BLD: 0 % (ref 0–6.9)
ERYTHROCYTE [DISTWIDTH] IN BLOOD BY AUTOMATED COUNT: 51 FL (ref 35.9–50)
FUNGUS SPEC CULT: NORMAL
FUNGUS SPEC FUNGUS STN: NORMAL
FUNGUS SPEC FUNGUS STN: NORMAL
GFR SERPLBLD CREATININE-BSD FMLA CKD-EPI: 19 ML/MIN/1.73 M 2
GLOBULIN SER CALC-MCNC: 2.5 G/DL (ref 1.9–3.5)
GLUCOSE BLD STRIP.AUTO-MCNC: 273 MG/DL (ref 65–99)
GLUCOSE BLD STRIP.AUTO-MCNC: 292 MG/DL (ref 65–99)
GLUCOSE BLD STRIP.AUTO-MCNC: 362 MG/DL (ref 65–99)
GLUCOSE FLD-MCNC: 292 MG/DL
GLUCOSE SERPL-MCNC: 286 MG/DL (ref 65–99)
GRAM STN SPEC: NORMAL
HCT VFR BLD AUTO: 31.9 % (ref 42–52)
HGB BLD-MCNC: 9.8 G/DL (ref 14–18)
IMM GRANULOCYTES # BLD AUTO: 0.26 K/UL (ref 0–0.11)
IMM GRANULOCYTES NFR BLD AUTO: 2.3 % (ref 0–0.9)
LDH FLD L TO P-CCNC: 737 U/L
LYMPHOCYTES # BLD AUTO: 0.13 K/UL (ref 1–4.8)
LYMPHOCYTES NFR BLD: 1.2 % (ref 22–41)
LYMPHOCYTES NFR FLD: 6 %
MCH RBC QN AUTO: 25.6 PG (ref 27–33)
MCHC RBC AUTO-ENTMCNC: 30.7 G/DL (ref 32.3–36.5)
MCV RBC AUTO: 83.3 FL (ref 81.4–97.8)
MONOCYTES # BLD AUTO: 0.7 K/UL (ref 0–0.85)
MONOCYTES NFR BLD AUTO: 6.3 % (ref 0–13.4)
MONOS+MACROS NFR FLD MANUAL: 45 %
NEUTROPHILS # BLD AUTO: 10 K/UL (ref 1.82–7.42)
NEUTROPHILS NFR BLD: 89.9 % (ref 44–72)
NEUTROPHILS NFR FLD: 14 %
NRBC # BLD AUTO: 0.02 K/UL
NRBC BLD-RTO: 0.2 /100 WBC (ref 0–0.2)
NUC CELL # FLD: 433 CELLS/UL
PH FLD: 8 [PH]
PLATELET # BLD AUTO: 72 K/UL (ref 164–446)
PLATELETS.RETICULATED NFR BLD AUTO: 12.9 % (ref 0.6–13.1)
POTASSIUM SERPL-SCNC: 5.9 MMOL/L (ref 3.6–5.5)
PROT FLD-MCNC: 2.9 G/DL
PROT SERPL-MCNC: 5.3 G/DL (ref 6–8.2)
RBC # BLD AUTO: 3.83 M/UL (ref 4.7–6.1)
RBC # FLD: <2000 CELLS/UL
SIGNIFICANT IND 70042: NORMAL
SITE SITE: NORMAL
SODIUM SERPL-SCNC: 131 MMOL/L (ref 135–145)
SOURCE SOURCE: NORMAL
WBC # BLD AUTO: 11.1 K/UL (ref 4.8–10.8)
WBC OTHER NFR FLD: 18 %

## 2024-06-26 PROCEDURE — 76776 US EXAM K TRANSPL W/DOPPLER: CPT

## 2024-06-26 PROCEDURE — 0W9B3ZZ DRAINAGE OF LEFT PLEURAL CAVITY, PERCUTANEOUS APPROACH: ICD-10-PCS

## 2024-06-26 PROCEDURE — 82150 ASSAY OF AMYLASE: CPT

## 2024-06-26 PROCEDURE — 82962 GLUCOSE BLOOD TEST: CPT | Mod: 91

## 2024-06-26 PROCEDURE — 700102 HCHG RX REV CODE 250 W/ 637 OVERRIDE(OP): Performed by: INTERNAL MEDICINE

## 2024-06-26 PROCEDURE — 85025 COMPLETE CBC W/AUTO DIFF WBC: CPT

## 2024-06-26 PROCEDURE — 87205 SMEAR GRAM STAIN: CPT | Mod: 91

## 2024-06-26 PROCEDURE — 80503 PATH CLIN CONSLTJ SF 5-20: CPT

## 2024-06-26 PROCEDURE — 700111 HCHG RX REV CODE 636 W/ 250 OVERRIDE (IP): Performed by: INTERNAL MEDICINE

## 2024-06-26 PROCEDURE — 700101 HCHG RX REV CODE 250: Performed by: INTERNAL MEDICINE

## 2024-06-26 PROCEDURE — 83986 ASSAY PH BODY FLUID NOS: CPT

## 2024-06-26 PROCEDURE — 99152 MOD SED SAME PHYS/QHP 5/>YRS: CPT

## 2024-06-26 PROCEDURE — 87015 SPECIMEN INFECT AGNT CONCNTJ: CPT | Mod: 91

## 2024-06-26 PROCEDURE — 83615 LACTATE (LD) (LDH) ENZYME: CPT

## 2024-06-26 PROCEDURE — 770000 HCHG ROOM/CARE - INTERMEDIATE ICU *

## 2024-06-26 PROCEDURE — 85055 RETICULATED PLATELET ASSAY: CPT

## 2024-06-26 PROCEDURE — 87070 CULTURE OTHR SPECIMN AEROBIC: CPT

## 2024-06-26 PROCEDURE — 88305 TISSUE EXAM BY PATHOLOGIST: CPT

## 2024-06-26 PROCEDURE — 87116 MYCOBACTERIA CULTURE: CPT

## 2024-06-26 PROCEDURE — 84157 ASSAY OF PROTEIN OTHER: CPT

## 2024-06-26 PROCEDURE — 51798 US URINE CAPACITY MEASURE: CPT

## 2024-06-26 PROCEDURE — A9270 NON-COVERED ITEM OR SERVICE: HCPCS | Performed by: HOSPITALIST

## 2024-06-26 PROCEDURE — 99233 SBSQ HOSP IP/OBS HIGH 50: CPT | Performed by: HOSPITALIST

## 2024-06-26 PROCEDURE — 97602 WOUND(S) CARE NON-SELECTIVE: CPT

## 2024-06-26 PROCEDURE — 80053 COMPREHEN METABOLIC PANEL: CPT

## 2024-06-26 PROCEDURE — 87102 FUNGUS ISOLATION CULTURE: CPT

## 2024-06-26 PROCEDURE — 82945 GLUCOSE OTHER FLUID: CPT

## 2024-06-26 PROCEDURE — 88112 CYTOPATH CELL ENHANCE TECH: CPT

## 2024-06-26 PROCEDURE — A9270 NON-COVERED ITEM OR SERVICE: HCPCS | Performed by: INTERNAL MEDICINE

## 2024-06-26 PROCEDURE — 700102 HCHG RX REV CODE 250 W/ 637 OVERRIDE(OP): Performed by: HOSPITALIST

## 2024-06-26 PROCEDURE — 32554 ASPIRATE PLEURA W/O IMAGING: CPT

## 2024-06-26 PROCEDURE — 89051 BODY FLUID CELL COUNT: CPT

## 2024-06-26 PROCEDURE — 87206 SMEAR FLUORESCENT/ACID STAI: CPT

## 2024-06-26 RX ORDER — DEXTROSE MONOHYDRATE 25 G/50ML
25 INJECTION, SOLUTION INTRAVENOUS
Status: DISCONTINUED | OUTPATIENT
Start: 2024-06-26 | End: 2024-07-01

## 2024-06-26 RX ORDER — ALLOPURINOL 100 MG/1
100 TABLET ORAL DAILY
Status: DISCONTINUED | OUTPATIENT
Start: 2024-06-26 | End: 2024-07-01

## 2024-06-26 RX ADMIN — ASPIRIN 81 MG: 81 TABLET, COATED ORAL at 17:26

## 2024-06-26 RX ADMIN — HYDROCORTISONE SODIUM SUCCINATE 50 MG: 100 INJECTION, POWDER, FOR SOLUTION INTRAMUSCULAR; INTRAVENOUS at 22:15

## 2024-06-26 RX ADMIN — MOMETASONE FUROATE AND FORMOTEROL FUMARATE DIHYDRATE 1 PUFF: 200; 5 AEROSOL RESPIRATORY (INHALATION) at 17:26

## 2024-06-26 RX ADMIN — OXYCODONE 5 MG: 5 TABLET ORAL at 12:00

## 2024-06-26 RX ADMIN — SIROLIMUS 4 MG: 0.5 TABLET ORAL at 05:26

## 2024-06-26 RX ADMIN — ATORVASTATIN CALCIUM 80 MG: 80 TABLET, FILM COATED ORAL at 22:15

## 2024-06-26 RX ADMIN — INSULIN GLARGINE-YFGN 20 UNITS: 100 INJECTION, SOLUTION SUBCUTANEOUS at 20:09

## 2024-06-26 RX ADMIN — HYDROCORTISONE SODIUM SUCCINATE 50 MG: 100 INJECTION, POWDER, FOR SOLUTION INTRAMUSCULAR; INTRAVENOUS at 13:43

## 2024-06-26 RX ADMIN — INSULIN HUMAN 7 UNITS: 100 INJECTION, SOLUTION PARENTERAL at 20:10

## 2024-06-26 RX ADMIN — MIDODRINE HYDROCHLORIDE 10 MG: 5 TABLET ORAL at 07:39

## 2024-06-26 RX ADMIN — GABAPENTIN 200 MG: 100 CAPSULE ORAL at 17:26

## 2024-06-26 RX ADMIN — OMEPRAZOLE 20 MG: 20 CAPSULE, DELAYED RELEASE ORAL at 05:27

## 2024-06-26 RX ADMIN — SODIUM BICARBONATE 150 MEQ: 84 INJECTION, SOLUTION INTRAVENOUS at 05:30

## 2024-06-26 RX ADMIN — INSULIN HUMAN 3 UNITS: 100 INJECTION, SOLUTION PARENTERAL at 09:18

## 2024-06-26 RX ADMIN — MIDODRINE HYDROCHLORIDE 10 MG: 5 TABLET ORAL at 12:01

## 2024-06-26 RX ADMIN — SODIUM BICARBONATE 150 MEQ: 84 INJECTION, SOLUTION INTRAVENOUS at 16:09

## 2024-06-26 RX ADMIN — INSULIN HUMAN 12 UNITS: 100 INJECTION, SOLUTION PARENTERAL at 13:52

## 2024-06-26 RX ADMIN — SODIUM BICARBONATE 150 MEQ: 84 INJECTION, SOLUTION INTRAVENOUS at 11:59

## 2024-06-26 RX ADMIN — SODIUM ZIRCONIUM CYCLOSILICATE 10 G: 10 POWDER, FOR SUSPENSION ORAL at 13:43

## 2024-06-26 RX ADMIN — MOMETASONE FUROATE AND FORMOTEROL FUMARATE DIHYDRATE 1 PUFF: 200; 5 AEROSOL RESPIRATORY (INHALATION) at 05:27

## 2024-06-26 RX ADMIN — MIDODRINE HYDROCHLORIDE 10 MG: 5 TABLET ORAL at 17:26

## 2024-06-26 RX ADMIN — HYDROCORTISONE SODIUM SUCCINATE 50 MG: 100 INJECTION, POWDER, FOR SOLUTION INTRAMUSCULAR; INTRAVENOUS at 05:27

## 2024-06-26 RX ADMIN — GABAPENTIN 200 MG: 100 CAPSULE ORAL at 05:27

## 2024-06-26 RX ADMIN — ALLOPURINOL 100 MG: 100 TABLET ORAL at 17:26

## 2024-06-26 ASSESSMENT — LIFESTYLE VARIABLES
AVERAGE NUMBER OF DAYS PER WEEK YOU HAVE A DRINK CONTAINING ALCOHOL: 0
EVER HAD A DRINK FIRST THING IN THE MORNING TO STEADY YOUR NERVES TO GET RID OF A HANGOVER: NO
HAVE YOU EVER FELT YOU SHOULD CUT DOWN ON YOUR DRINKING: NO
ALCOHOL_USE: NO
ON A TYPICAL DAY WHEN YOU DRINK ALCOHOL HOW MANY DRINKS DO YOU HAVE: 0
HAVE PEOPLE ANNOYED YOU BY CRITICIZING YOUR DRINKING: NO
CONSUMPTION TOTAL: NEGATIVE
DOES PATIENT WANT TO STOP DRINKING: NO
TOTAL SCORE: 0
HOW MANY TIMES IN THE PAST YEAR HAVE YOU HAD 5 OR MORE DRINKS IN A DAY: 0
EVER FELT BAD OR GUILTY ABOUT YOUR DRINKING: NO
TOTAL SCORE: 0
TOTAL SCORE: 0

## 2024-06-26 ASSESSMENT — ENCOUNTER SYMPTOMS
SHORTNESS OF BREATH: 1
FEVER: 0

## 2024-06-26 ASSESSMENT — PAIN DESCRIPTION - PAIN TYPE
TYPE: ACUTE PAIN

## 2024-06-26 ASSESSMENT — SOCIAL DETERMINANTS OF HEALTH (SDOH)

## 2024-06-26 ASSESSMENT — FIBROSIS 4 INDEX
FIB4 SCORE: 3.57
FIB4 SCORE: 4.16

## 2024-06-26 NOTE — ASSESSMENT & PLAN NOTE
Continue daily dressing changes, wound care left axilla  With malignant pleural effusion  He has been on palliative immunotherapy  Last immunotherapy by Dr. Fajardo was 3 weeks ago though he has not been tolerating it.   Dr. Fajardo has consulted

## 2024-06-26 NOTE — ASSESSMENT & PLAN NOTE
Potassium 6.5, likely secondary to progression of renal failure  Treated in ER with albuterol nebulizer, insulin/dextrose, IV bicarb  Continue bicarb, ordered Lokelma, renal diet  Continuous telemetry monitoring to evaluate for arrhythmias  Dialysis consideration as per nephrology after kidney biospy

## 2024-06-26 NOTE — ASSESSMENT & PLAN NOTE
Unclear etiology  IV bicarb drip was given and turned off due to volume overload  Nephrology consulted  Follow urine output and basic metabolic panel ordered for the morning  Kidney biopsy 6/27  Renal ultrasound did not reveal abnormalities  Creatinine 2.63

## 2024-06-26 NOTE — PROGRESS NOTES
Hospital Medicine Daily Progress Note    Date of Service  6/26/2024    Chief Complaint  Jama Altman is a 68 y.o. male admitted 6/25/2024 with shortness of breath and worsening kidney function    Hospital Course  Agapito is a 68 y.o. 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  He was sent for admission by Dr. León/nephrology.  Blood work showed WBC 12.1, hemoglobin 11.5, 9, potassium 6.5, glucose 231, creatinine 3.7, .  His weight is 248 pounds.  Chest x-ray: Moderate sized bilateral pleural effusions, bibasilar opacities, moderate cardiomegaly  He was started on IV bicarb and will be admitted to IMCU  He has blood pressure 83/60.  He states that he always has low blood pressure and is on midodrine at home 10 mg 3 times daily.  He is on 4 L nasal cannula  Currently not in respiratory distress  Patient aware that possibly he will need to be started on dialysis.  He states that lately he has to push hard to urinate     Treatment in ER included dextrose/insulin, IV bicarb, nebulizer of albuterol, IV Lasix 80 mg       Interval Problem Update  6/26/2024: Mr. Altman was evaluated in the IMCU.  He is on IV bicarb drip with 3 amp of bicarb at 150 mL/h.  His creatinine this morning is 3.46 with a potassium 5.9. his significant other, Ashely is at bedside.  I have reached out to Dr. Fajardo his oncologist to let him know he is here.  Thoracentesis was done today with 2 L  out the left lung his uric acid came back over 13 and he has been started on allopurinol.    I have discussed this patient's plan of care and discharge plan at IDT rounds today with Case Management, Nursing, Nursing leadership, and other members of the IDT team.    Consultants/Specialty  nephrology I spoke with Dr. Hudson in person  Oncology, I spoke with Dr. Fajardo    Code Status  Full Code    Disposition  The patient is not medically cleared for discharge to home or a post-acute facility.      I have placed the appropriate orders for post-discharge needs.    Review of Systems  Review of Systems   Constitutional:  Positive for malaise/fatigue. Negative for fever.   Respiratory:  Positive for shortness of breath.    Cardiovascular:  Positive for chest pain and leg swelling.   All other systems reviewed and are negative.       Physical Exam  Temp:  [35.9 °C (96.6 °F)-36.3 °C (97.3 °F)] 36.1 °C (96.9 °F)  Pulse:  [] 77  Resp:  [15-62] 16  BP: ()/(51-87) 116/65  SpO2:  [90 %-98 %] 98 %    Physical Exam  Vitals and nursing note reviewed.   Constitutional:       Appearance: He is ill-appearing.   HENT:      Mouth/Throat:      Mouth: Mucous membranes are dry.   Cardiovascular:      Rate and Rhythm: Normal rate. Rhythm irregular.   Pulmonary:      Comments: Few crackles  6 liters of oxygen  Abdominal:      General: There is distension.      Tenderness: There is no abdominal tenderness.   Musculoskeletal:      Right lower leg: Edema present.      Left lower leg: Edema present.      Comments: Left arm swollen  Left axilla open wound is packed   Neurological:      Mental Status: He is alert.         Fluids    Intake/Output Summary (Last 24 hours) at 6/26/2024 0721  Last data filed at 6/26/2024 0600  Gross per 24 hour   Intake 864.66 ml   Output 600 ml   Net 264.66 ml       Laboratory  Recent Labs     06/25/24  1903 06/26/24  0310   WBC 13.0* 11.1*   RBC 4.10* 3.83*   HEMOGLOBIN 10.5* 9.8*   HEMATOCRIT 34.4* 31.9*    MCV 83.9 83.3   MCH 25.6* 25.6*   MCHC 30.5* 30.7*   RDW 50.8* 51.0*   PLATELETCT 96* 72*     Recent Labs     06/24/24  0825 06/25/24  1903 06/26/24  0310   SODIUM 132* 130* 131*   POTASSIUM 6.0* 6.5* 5.9*   CHLORIDE 92* 94* 92*   CO2 24 21 26   GLUCOSE 231* 362* 286*   * 114* 109*   CREATININE 3.71* 3.68* 3.46*   CALCIUM 9.1 9.1 8.9                   Imaging  US-RENAL TRANSPLANT COMP   Final Result         1.  Normal resistive indices and acceleration time      DX-CHEST-PORTABLE (1 VIEW)   Final Result      1.  Moderate-sized bilateral pleural effusions      2.  Bibasilar airspace opacities consistent with atelectasis, pneumonitis, and/or pleural effusion..      3.  Moderate cardiomegaly.      US-THORACENTESIS PUNCTURE LEFT    (Results Pending)        Assessment/Plan  * SANKET (acute kidney injury) (HCC) on CKD stage III- (present on admission)  Assessment & Plan  Unclear etiology  IV bicarb drip  Nephrology consulted  Follow urine output and basic metabolic panel ordered for the morning  Kidney biopsy ordered for tomorrow  Renal ultrasound did not reveal abnormalities    Hyperkalemia- (present on admission)  Assessment & Plan  Potassium 6.5, likely secondary to progression of renal failure  Treated in ER with albuterol nebulizer, insulin/dextrose, IV bicarb  Continue bicarb, ordered Lokelma, renal diet  Continuous telemetry monitoring to evaluate for arrhythmias  Dialysis consideration as per nephrology after kidney biospy      Pleural effusion- (present on admission)  Assessment & Plan  Bilateral moderate pleural effusions noted.  Likely secondary to volume overload, however infection cannot be excluded  Will order diagnostic/therapeutic thoracentesis    Hypotension  Assessment & Plan  May be combination of intravascular dehydration with adrenal insufficiency  IV hydrocortisone for stress-doseing, wean down and transition back to prednisone once blood pressure is stable  IV fluids  Continue  midodrine  Monitor closely in IMCU with vitals every 2 hours to consider vasopressors if his blood pressure drops    Lymphedema on LUE- (present on admission)  Assessment & Plan  Continue compression therapy    HFmrEF- (present on admission)  Assessment & Plan  EF 45%  Continue Lasix  Nephrology consult    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 Fajardo was 3 weeks ago.  Dr. Fajardo has been consulted    Long term (current) use of systemic steroids- (present on admission)  Assessment & Plan  Immunocompromised.  On prednisone 20 mg once a day  Consider Bactrim for PCP prophylaxis    Abdominal aortic aneurysm (AAA) without rupture (HCC)- (present on admission)  Assessment & Plan  History of    Secondary adrenal insufficiency (HCC)- (present on admission)  Assessment & Plan  Secondary to chronic steroid use  Will order IV hydrocortisone until blood pressure stabilizes    Acute respiratory failure with hypoxia (HCC)- (present on admission)  Assessment & Plan  Patient states he has been on oxygen since Friday  Chest x-ray showed bilateral moderate pleural effusion, bilateral airspace disease  , Discharged on 6/21 from this hospital after treatment for pneumonia with 14 days of levofloxacin and for volume overload with IV Lasix  Likely will need continue p.o. Lasix, dosage per nephrology  Consider thoracentesis if shortness of breath did not improve  Consideration for dialysis  Currently on 4 L nasal cannula  Consider repeat CT of the chest as well as pulmonology consult to evaluate for  opportunistic infection such as PCP pneumonia due to immunocompromise state, beta glucan screen    A-fib (HCC) s/p Watchman- (present on admission)  Assessment & Plan  Noted.  Not on anticoagulation    Type 2 diabetes  mellitus (HCC)- (present on admission)  Assessment & Plan  Uncontrolled with hyperglycemia 362  Continue Lantus 20 units every evening, insulin sliding scale change to high dose  He likely will need higher dose lantus in the setting of stress-dose steroids though hold for now as he is n.p.o. at midnight for kidney biopsy in the morning  Diabetic diet    Primary hypertension- (present on admission)  Assessment & Plan  Will hold Toprol due to hypotension    History of renal transplant- (present on admission)  Assessment & Plan  Likely failing transplant  Continue sirolimus, prednisone  Transplant kidney biopsy ordered for 6/27  Nephrology consulted         VTE prophylaxis:   SCDs/TEDs      I have performed a physical exam and reviewed and updated ROS and Plan today (6/26/2024). In review of yesterday's note (6/25/2024), there are no changes except as documented above.

## 2024-06-26 NOTE — CARE PLAN
The patient is Watcher - Medium risk of patient condition declining or worsening    Shift Goals  Clinical Goals: monitor o2 sats, wean o2 down, monitor labs  Patient Goals: sleep  Family Goals: updates    Progress made toward(s) clinical / shift goals:    Problem: Respiratory  Goal: Patient will achieve/maintain optimum respiratory ventilation and gas exchange  Outcome: Progressing     Problem: Mobility  Goal: Patient's capacity to carry out activities will improve  Outcome: Progressing     Problem: Fall Risk  Goal: Patient will remain free from falls  Outcome: Progressing       Patient is not progressing towards the following goals:      Problem: Skin Integrity  Goal: Skin integrity is maintained or improved  Outcome: Not Progressing  Note: Patient refusing q2 turns and skin breakdown preventative measures

## 2024-06-26 NOTE — ED NOTES
Bedside report received from off going RN: Cornelia, assumed care of patient.  POC discussed with patient. Call light within reach, all needs addressed at this time.       Fall risk interventions in place: Patient's personal possessions are with in their safe reach, Give patient urinal if applicable, Keep floor surfaces clean and dry, and Accompanied to restroom (all applicable per [facility] Fall risk assessment)   Continuous monitoring: Cardiac Leads, Pulse Ox, or Blood Pressure  IVF/IV medications: Not Applicable   Oxygen: How many liters 4L  Bedside sitter: Not Applicable   Isolation: Not Applicable

## 2024-06-26 NOTE — WOUND TEAM
Assisted Wound RN Uziel with skin assessment and wound consult evaluation for pressure injury.  Additional staff necessary for turning/standing from chair, repositioning patient, assisting with dressing application and supplies and determining progress, assessment and staging of pressure injuries and/or complicated wound care.

## 2024-06-26 NOTE — ASSESSMENT & PLAN NOTE
Bilateral moderate pleural effusions noted.   2 liters off the left side on 6/26 in which cells were positive for squamous cell carcinoma thus it is a malignant pleural effusion.  On 6/30 he has bilateral effusions with 1.7 liters off the left side again.  He may benefit from a pleurex catheter if the effusions continue.

## 2024-06-26 NOTE — ASSESSMENT & PLAN NOTE
Secondary to chronic steroid use  IV hydrocortisone was given and now transition back to prednisone 20 mg now that kidney biopsy has been done and blood pressure has gone up

## 2024-06-26 NOTE — H&P
Hospital Medicine History & Physical Note    Date of Service  6/25/2024    Primary Care Physician  Ganesh Benton III, M.D.        Code Status  Full Code    Chief Complaint  Chief Complaint   Patient presents with    Abnormal Labs     Pt sent by nephrology due to elevated BUN and creatinine along with electrolyte abnormalities.  Pt also endorses increased swelling to lower extremities.  Pt is currently receiving immunotherapy and radiation for squamous cell carcinoma.         History of Presenting Illness  Jama Altman is a 68 y.o. 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  He was sent for admission by Dr. León/nephrology.  Blood work showed WBC 12.1, hemoglobin 11.5, 9, potassium 6.5, glucose 231, creatinine 3.7, .  His weight is 248 pounds.  Chest x-ray: Moderate sized bilateral pleural effusions, bibasilar opacities, moderate cardiomegaly  Per Dr. Wylie, he was started on IV bicarb and will be admitted to [facility]  He has blood pressure 83/60.  He states that he always has low blood pressure and is on midodrine at home 10 mg 3 times daily.  He is on 4 L nasal cannula  Currently not in respiratory distress  Patient aware that possibly he will need to be started on dialysis.  He states that lately he has to push hard to urinate    Treatment in ER included dextrose/insulin, IV bicarb, nebulizer of albuterol, IV Lasix 80 mg    I discussed the plan of care  with patient, family, and ERP .    Review of Systems  Review of Systems   Constitutional:  Positive for malaise/fatigue. Negative for chills, fever and weight loss.   HENT:  Negative for ear pain, hearing loss and tinnitus.    Eyes:  Negative for blurred vision, double vision and photophobia.   Respiratory:  Negative for cough, hemoptysis and sputum production.    Cardiovascular:  Negative for chest pain, palpitations and orthopnea.   Gastrointestinal:  Negative for heartburn, nausea and vomiting.   Genitourinary:  Negative for dysuria, flank pain, frequency and hematuria.   Musculoskeletal:  Positive for joint pain. Negative for back pain and neck pain.   Skin:  Negative for itching and rash.   Neurological:  Positive for weakness. Negative for tremors, speech change, focal weakness and headaches.   Endo/Heme/Allergies:  Negative for environmental allergies and polydipsia. Does not bruise/bleed easily.   Psychiatric/Behavioral:  Negative for hallucinations and substance abuse. The patient is not nervous/anxious.        Past Medical History   has a past medical history of A-fib (HCC), Arrhythmia, Benign essential hypertension, Diabetes (HCC), Heart burn, Hemorrhagic disorder (HCC), Hyperlipoproteinemia, Hypertension, Indigestion, Myocardial infarct (HCC) (2013), Polycystic kidney (09/10/2010), Presence of Watchman left atrial appendage closure device (02/29/2024), Sleep apnea (01/30/2024), and Snoring.    Surgical History   has a past surgical history that includes knee arthroplasty total (01/12/2007); knee arthroscopy (04/10/2006); other orthopedic surgery (07/08/1974); other (09/10/2010); knee arthroscopy (05/03/2011); meniscectomy, knee, medial (05/03/2011); knee unicompartmental (12/23/2011); knee arthroscopy (12/23/2011); knee manipulation (02/16/2012); and stent placement (2013).     Family History  family history is not on file.   Family history reviewed with patient. There is no family history that is  pertinent to the chief complaint.     Social History   reports that he has never smoked. He has never been exposed to tobacco smoke. He has never used smokeless tobacco. He reports that he does not drink alcohol and does not use drugs.    Allergies  Allergies   Allergen Reactions    Aceinhibitors [Ace Inhibitors] Unspecified     SANKET on CKD, S/P renal transplant. On midodrine. ACE contraindicated with current SCr.     Angiotensin Receptor Blockers Unspecified     SANKET on CKD, S/P renal transplant. On midodrine. ARB contraindicated with current SCr.       Doxycycline Rash     Sweats and shakes: 9/28/17: Clarified allergy with patient. Allergy was in 1998 and he doesn't remember what happened. He thought the medication is for pain.  Tolerates doxycycline 9/2017       Medications  Prior to Admission Medications   Prescriptions Last Dose Informant Patient Reported? Taking?   Insulin Pen Needle 32 G x 4 mm  Patient No No   Sig: Use one pen needle in pen device to inject insulin once daily.   Sirolimus 2 MG Tab  Patient Yes No   Sig: Take 4 mg by mouth every day. 4 mg = 2 tabs   acetaminophen-codeine #3 (TYLENOL #3) 300-30 MG Tab  Patient Yes No   Sig: Take 1 Tablet by mouth every evening.   aspirin 81 MG EC tablet  Patient No No   Sig: Take 1 Tablet by mouth every day.   Patient taking differently: Take 81 mg by mouth every evening.   atorvastatin (LIPITOR) 80 MG tablet  Patient No No   Sig: Take 1 Tablet by mouth at bedtime.   benzonatate (TESSALON) 200 MG capsule  Patient No No   Sig: Take 1 Capsule by mouth 3 times a day as needed for Cough.   fluticasone-salmeterol (WIXELA INHUB) 250-50 MCG/ACT AEROSOL POWDER, BREATH ACTIVATED  Patient Yes No   Sig: Inhale 1 Puff every 12 hours.   furosemide (LASIX) 40 MG Tab   No No   Sig: Take 1 Tablet by mouth 2 times a day for 30 days.   gabapentin (NEURONTIN) 100 MG Cap   No No   Sig: Take 2 Capsules by mouth 2 times a day for 30 days.   insulin glargine 100 UNIT/ML Solution  Pen-injector injection  Patient No No   Sig: Inject 20 Units under the skin every evening.   metoprolol SR (TOPROL XL) 100 MG TABLET SR 24 HR   No No   Sig: Take 1 Tablet by mouth 2 times a day for 30 days.   midodrine (PROAMATINE) 10 MG tablet   No No   Sig: Take 1 Tablet by mouth 3 times a day with meals for 30 days.   omeprazole (PRILOSEC) 20 MG delayed-release capsule  Patient No No   Sig: Take 1 Capsule by mouth every day. Indications: Heartburn   predniSONE (DELTASONE) 10 MG Tab  Patient Yes No   Sig: Take 20 mg by mouth every day. 20 mg = 2 tabs      Facility-Administered Medications: None       Physical Exam  Temp:  [35.9 °C (96.6 °F)-36.3 °C (97.3 °F)] 36.3 °C (97.3 °F)  Pulse:  [] 110  Resp:  [16-62] 56  BP: ()/(51-87) 83/51  SpO2:  [90 %-95 %] 95 %  Blood Pressure : (!) 83/51   Temperature: 36.3 °C (97.3 °F)   Pulse: (!) 110   Respiration: (!) 56   Pulse Oximetry: 95 %       Physical Exam  Vitals and nursing note reviewed.   Constitutional:       General: He is not in acute distress.     Appearance: He is ill-appearing.   HENT:      Head: Normocephalic and atraumatic.      Nose: Nose normal.      Mouth/Throat:      Mouth: Mucous membranes are moist.   Eyes:      Extraocular Movements: Extraocular movements intact.      Pupils: Pupils are equal, round, and reactive to light.   Cardiovascular:      Rate and Rhythm: Normal rate and regular rhythm.   Pulmonary:      Effort: Pulmonary effort is normal.      Breath sounds: Normal breath sounds.   Abdominal:      General: Abdomen is flat. There is no distension.      Tenderness: There is no abdominal tenderness.   Musculoskeletal:         General: No swelling or deformity. Normal range of motion.      Cervical back: Normal range of motion and neck supple.      Right lower leg: Edema present.      Left lower leg: Edema present.      Comments: Left axilla has full-thickness wound covered with dressing.  Left upper extremity is in compression sleeve    "  Skin:     General: Skin is warm and dry.      Comments: Extensive subcutaneous hematoma over the right flank, right lower back   Neurological:      General: No focal deficit present.      Mental Status: He is alert and oriented to person, place, and time.   Psychiatric:         Mood and Affect: Mood normal.         Behavior: Behavior normal.         Laboratory:  Recent Labs     06/25/24 1903   WBC 13.0*   RBC 4.10*   HEMOGLOBIN 10.5*   HEMATOCRIT 34.4*   MCV 83.9   MCH 25.6*   MCHC 30.5*   RDW 50.8*   PLATELETCT 96*     Recent Labs     06/24/24  0825 06/25/24 1903   SODIUM 132* 130*   POTASSIUM 6.0* 6.5*   CHLORIDE 92* 94*   CO2 24 21   GLUCOSE 231* 362*   * 114*   CREATININE 3.71* 3.68*   CALCIUM 9.1 9.1     Recent Labs     06/24/24 0825 06/25/24 1903   ALTSGPT 23 22   ASTSGOT 29 24   ALKPHOSPHAT 85 87   TBILIRUBIN 0.7 0.7   GLUCOSE 231* 362*         No results for input(s): \"NTPROBNP\" in the last 72 hours.      No results for input(s): \"TROPONINT\" in the last 72 hours.    Imaging:  DX-CHEST-PORTABLE (1 VIEW)   Final Result      1.  Moderate-sized bilateral pleural effusions      2.  Bibasilar airspace opacities consistent with atelectasis, pneumonitis, and/or pleural effusion..      3.  Moderate cardiomegaly.          X-Ray:  I have personally reviewed the images and compared with prior images.    Assessment/Plan:  Justification for Admission Status  I anticipate this patient will require at least two midnights for appropriate medical management, necessitating inpatient admission because acute on chronic renal failure    Patient will need a Intermediate Care (Adult and Pediatrics) bed on MEDICAL service .  The need is secondary to acute on chronic renal failure.    * SANKET (acute kidney injury) (HCC) on CKD stage III- (present on admission)  Assessment & Plan  With volume overload.  By history, patient may have chronic urine retention.  Ordered bladder scan and kidney ultrasound  Continue IV bicarb for " hyperkalemia  Nephrology consult tomorrow to consider initiation of hemodialysis  Avoid nephrotoxins  Adjust doses of medications renally  Monitor input and output and daily weight    Acute respiratory failure with hypoxia (HCC)- (present on admission)  Assessment & Plan  Patient states he has been on oxygen since Friday  Chest x-ray showed bilateral moderate pleural effusion, bilateral airspace disease  , Discharged on 6/21 from this hospital after treatment for pneumonia with 14 days of levofloxacin and for volume overload with IV Lasix  Likely will need continue p.o. Lasix, dosage per nephrology  Consider thoracentesis if shortness of breath did not improve  Consideration for dialysis  Currently on 4 L nasal cannula  Consider repeat CT of the chest as well as pulmonology consult to evaluate for  opportunistic infection such as PCP pneumonia due to immunocompromise state, beta glucan screen    Hyperkalemia  Assessment & Plan  Potassium 6.5, likely secondary to progression of renal failure  Treated in ER with albuterol nebulizer, insulin/dextrose, IV bicarb  Continue bicarb, ordered Lokelma, renal diet  Dialysis consideration as per nephrology      Pleural effusion  Assessment & Plan  Bilateral moderate pleural effusions noted.  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  On prednisone 20 mg once a day  Consider Bactrim for PCP prophylaxis    Abdominal aortic aneurysm (AAA) without rupture (HCC)- (present on admission)  Assessment & Plan  History of    Secondary adrenal insufficiency (HCC)- (present on admission)  Assessment & Plan  Secondary to chronic steroid use  Will order IV hydrocortisone until blood pressure stabilizes    A-fib (HCC) s/p Watchman- (present on admission)  Assessment & Plan  Noted.  Not on anticoagulation    Type 2 diabetes mellitus (HCC)- (present on admission)  Assessment & Plan  Uncontrolled with hyperglycemia 362  Continue Lantus 20 units every evening, insulin sliding scale  Hypoglycemia protocol    Primary hypertension- (present on admission)  Assessment & Plan  Will hold Toprol due to hypotension    History of renal transplant- (present on admission)  Assessment & Plan  Likely failing transplant  Continue sirolimus, prednisone  Transplant kidney ultrasound to assess for signs of rejection  Nephrology consult tomorrow        VTE prophylaxis: heparin ppx

## 2024-06-26 NOTE — CARE PLAN
The patient is Watcher - Medium risk of patient condition declining or worsening    Shift Goals  Clinical Goals: spo2>90%, hemodynamic stability  Patient Goals: rest, updates  Family Goals: updates    Progress made toward(s) clinical / shift goals:    Problem: Knowledge Deficit - Standard  Goal: Patient and family/care givers will demonstrate understanding of plan of care, disease process/condition, diagnostic tests and medications  Outcome: Progressing  Note: Pt educated regarding plan of care and medications. All questions answered.        Problem: Respiratory  Goal: Patient will achieve/maintain optimum respiratory ventilation and gas exchange  Outcome: Progressing       Patient is not progressing towards the following goals:

## 2024-06-26 NOTE — PROGRESS NOTES
Patient refused sacral mepilex and offloading pillow under bottom and bilat feet. Patient agreeable to bed extender, heel mepilex, and barrier cream on sacral area open wound. Patient educated on the importance of skin breakdown prevention and charge RN notified.

## 2024-06-26 NOTE — PROGRESS NOTES
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 Patient has had heavy drainage from left axillary necrotic fungating tumor. Patient was referred to [facility] for wound care.    Pertinent Medical History: See above     TOBACCO USE:  Denies ever using    Patient's problem list, allergies, and current medications reviewed and updated in Epic    Interval History:    5/1/2024: Clinic visit with Ze Martins MD. Patient reports feeling in normal state of health. He is having some discomfort from left axillary necrotic tumor. He reprots that over the past week much of the fungating necrotic tumor has been sloughing off leaving large necrotic wound with thick slough. Patient has heavy drainage necessitating frequent dressing changes noting increased odor. His drain fell out today, is hanging by suture. Patient also endorses increased edema of left arm which is causing some discomfort.    5/14/2024: Clinic visit with Ze Martins MD. Patient reports doing ok. He denies any signs or symptoms of infection. Reports drainage has decreased and odor has improved with use of topical crushed flagyl. Will recommend he start with damp gauze to axilla to promote easier dressing changes. He has new wound left forearm with heavy serous drainage undoubtedly due to lymphedema of left arm.    5/28/2024: Clinic visit with Ze Martins MD. Patient denies any signs or symptoms of infection. Patient reports that he is having worsening pain left axilla, has pain medications from oncology but is trying not to take them as he does not like  how he feels on them. He is taking OTC medication. Patient with development of new nodules periwound, axilla, chest wall. They do not appear to be abscesses, but rather hard nodules. Encouraged him to show to oncology as they are concerning for possible localized spread of cancer. Patient reports some tape trauma, recommended using Spandage though he reports does not work. Encouraged him to alternate tape sites to cut down on trauma to skin.    6/25/2024: Clinic visit with Ze Martins MD. Patient reports feeling ok after being released from hospital. Has extensive ecchymosis after receiving heparin gtt while hospitalized due to concern for PE. Patient's wound is larger with increased periwound nodules with some necrosis. Lymphedema appears poorly controlled with occasional drainage. Family reports his oncologist Dr. Fajardo want to discuss case with me, I called during appointment today and left voicemail.    REVIEW OF SYSTEMS:   Unchanged from previous wound clinic assessment on 5/28/2024, except as noted in interval history above      PHYSICAL EXAMINATION:   /52   Pulse 86   Temp 35.9 °C (96.6 °F) (Temporal)   Resp 18   SpO2 91%     Physical Exam  Constitutional:       General: He is not in acute distress.     Appearance: Normal appearance.   Cardiovascular:      Rate and Rhythm: Normal rate.      Pulses: Normal pulses.   Pulmonary:      Effort: Pulmonary effort is normal. No respiratory distress.   Musculoskeletal:      Comments: Left upper extremity edema extending from fingers to axilla   Skin:     Comments: Left Axillary Necrotic Tumor: Wound larger, increased nodules periwound with breakdown concerning for additional cancer burden. No evidence of infection.    Left forearm open wound: Reoccurrence, superficial. Not currently draining    Neurological:      Mental Status: He is alert.         WOUND ASSESSMENT  Wound 03/06/24 Full Thickness Wound Axilla Left --Left Axilla (Active)   Wound Image    06/25/24 1400   Site Assessment Red;Yellow;Pink;Painful 06/25/24 1400   Periwound Assessment Blanchable erythema;Other (Comment) 06/25/24 1400   Margins Unattached edges 06/25/24 1400   Closure Other (Comment) 05/01/24 1656   Drainage Amount Large 06/25/24 1400   Drainage Description Serous 06/25/24 1400   Treatments Site care 06/25/24 1400   Wound Cleansing Not Applicable 06/25/24 1400   Periwound Protectant Not Applicable 06/25/24 1400   Dressing Changed Changed 06/25/24 1400   Dressing Cleansing/Solutions Normal Saline 06/25/24 1400   Dressing Options Moist Roll Gauze;Absorbent Abdominal Pad;Hypafix Tape;Tubigrip 06/25/24 1400   Dressing Change/Treatment Frequency Daily, and As Needed 06/25/24 1400   Wound Team Following Bi-Monthly 06/25/24 1400   Non-staged Wound Description Full thickness 06/25/24 1400   Wound Length (cm) 10.6 cm 06/25/24 1400   Wound Width (cm) 4.6 cm 06/25/24 1400   Wound Depth (cm) 4.1 cm 06/25/24 1400   Wound Surface Area (cm^2) 48.76 cm^2 06/25/24 1400   Wound Volume (cm^3) 199.916 cm^3 06/25/24 1400   Wound Healing % -6 06/25/24 1400   Tunneling (cm) 0 cm 06/25/24 1400   Tunneling Clock Position of Wound 12 05/01/24 1656   Undermining (cm) 2 cm 06/25/24 1400   Undermining of Wound, 1st Location From 11 o'clock;To 2 o'clock 06/25/24 1400   Wound Odor None 06/25/24 1400   Exposed Structures JUDITH 06/25/24 1400   Number of days: 111       Wound 06/11/24 Soft Tissue Necrosis Axilla Left (Active)   Number of days: 14       Wound 06/12/24 Full Thickness Wound Buttocks Bilateral (Active)   Number of days: 13       Wound 06/25/24 Full Thickness Wound Anterior Right Forearm (Active)   Wound Image   06/25/24 1400   Site Assessment Red;Fragile 06/25/24 1400   Periwound Assessment Scabbed;Edema 06/25/24 1400   Margins Attached edges 06/25/24 1400   Drainage Amount Moderate 06/25/24 1400   Drainage Description Serous;Serosanguineous 06/25/24 1400   Treatments Cleansed 06/25/24 1400   Wound Cleansing  Hypochlorus Acid 06/25/24 1400   Periwound Protectant Not Applicable 06/25/24 1400   Dressing Cleansing/Solutions Not Applicable 06/25/24 1400   Dressing Options Silicone Adhesive Foam;Tubigrip 06/25/24 1400   Dressing Change/Treatment Frequency Every 72 hrs, and As Needed 06/25/24 1400   Wound Team Following Bi-Monthly 06/25/24 1400   Non-staged Wound Description Full thickness 06/25/24 1400   Wound Length (cm) 0.5 cm 06/25/24 1400   Wound Width (cm) 0.3 cm 06/25/24 1400   Wound Depth (cm) 0.1 cm 06/25/24 1400   Wound Surface Area (cm^2) 0.15 cm^2 06/25/24 1400   Wound Volume (cm^3) 0.015 cm^3 06/25/24 1400   Tunneling (cm) 0 cm 06/25/24 1400   Undermining (cm) 0 cm 06/25/24 1400   Wound Odor None 06/25/24 1400   Exposed Structures None 06/25/24 1400   Number of days: 0     PROCEDURE:   - Excisional debridement is contraindicated by active cancer diagnosis. No debridement required.      Pertinent Labs and Diagnostics:    Labs:     A1c:   Lab Results   Component Value Date/Time    HBA1C 11.2 (A) 05/23/2024 03:10 PM          IMAGING: See imaging    VASCULAR STUDIES: US LUE negative for DVT    LAST  WOUND CULTURE:  DATE :   Lab Results   Component Value Date/Time    CULTRSULT  06/12/2024 08:12 PM     Light growth usual upper respiratory shaun including yeast.  No clinically significant Staphylococcus aureus, Methicillin  Resistant Staphylococcus aureus, or Pseudomonas species  isolated.           ASSESSMENT AND PLAN:     1. Squamous cell carcinoma of left upper extremity    6/25/2024  - Left axillary SCC with necrotic fungating tumor which is necrosing and has formed large necrotic wound  - Wound larger. Has increased periwound skin nodules and skin breakdown concerning for increased cancer burden.  - Counseled of limited options for wound healing with active cancer, do not expect wound to resolve until cancer is eradicated   - Continue palliative wound care to manage drainage and manage odor. Both have been well  managed.  - Reports that he has plenty of flagyl  - Continue to follow with oncology. Patient continues immunotherapy.  Wound Care: Damp to dry dressings with rolled gauze, dry gauze, Abd pad, tape    2. Open wound left forearm    6/25/2024  - Wound reoccurred. No active drainage today.  - Continue compression   - Continue with absorptive dressings to manage drainage  - Continue to follow up with lymphedema clinic.    3. Abscess of axilla, left    6/25/2024  - CT with resolution of fluid collection  - Follows with ID, being observed off Abx    4. Lymphedema of left arm    6/25/2024  - Due to location of invasive SCC in left axilla, left arm swelling appears to be consistent with lymphedema. He is have some discomfort due to the edema  - Patient was referred to lymphedema clinic and currently receiving treatment. Continue to follow up with lymphedema clinic.    5. History of renal transplant  6. Immunosuppressed status (HCC)  - Risk factor for impaired wound healing.    PATIENT EDUCATION  - Importance of adequate nutrition for wound healing  -Advised to go to ER for any increased redness, swelling, drainage, or odor, or if patient develops fever, chills, nausea or vomiting.     My total time spent caring for the patient on the day of the encounter was 20 minutes, reviewing history, assessment, counseling and education, and coordination of care.  This does not include time spent on separately billable procedures/tests.    Please note that this note may have been created using voice recognition software. I have worked with technical experts from [facility] to optimize the interface.  I have made every reasonable attempt to correct obvious errors, but there may be errors of grammar and possibly content that I did not discover before finalizing the note.    N

## 2024-06-26 NOTE — ASSESSMENT & PLAN NOTE
May be combination of intravascular dehydration with adrenal insufficiency  IV hydrocortisone for stress-doseing, wean to home prednisone 20 mg daily   IV fluids with bicarb drip were given  Continue midodrine  Monitor closely in IMCU

## 2024-06-26 NOTE — PROGRESS NOTES
[facility] Acceptance Note    Called by ERP for admission to IMCU for worsening renal function, hyperkalemia.  68-year-old male, PMH kidney transplant, recently admitted and discharged 6/21 with left axillary wound secondary to fungating squamous cell carcinoma, discharged home on Lasix twice daily with history of HFrEF, EF 45% and atrial fibrillation.  He denied fevers or chills but was having some dyspnea and is on low-flow oxygen with some pleural effusions on CXR.  Outpatient labs today showed a potassium of 6.0, BUN of 101, creatinine 3.7 and a glucose of 231.  Nephrology recommended to come to the ER for further evaluation.  He is mildly hypotensive but not in distress.  Dr. Wylie recommending medical treatment of hyperkalemia, IV Lasix, bicarbonate infusion.  If renal function continues to deteriorate may require hemodialysis.  Appropriate for admission to IMCU under the care of the hospitalist.  Critical care available as needed.

## 2024-06-26 NOTE — WOUND TEAM
[facility]  Inpatient Services  Initial Wound and Skin Care Evaluation    Admission Date: 6/25/2024     Last order of IP CONSULT TO WOUND CARE was found on 6/26/2024 from Hospital Encounter on 6/25/2024     HPI, PMH, SH: Reviewed    Past Surgical History:   Procedure Laterality Date    STENT PLACEMENT  2013    cardiac    KNEE MANIPULATION  02/16/2012    Performed by LATOYA CONNER at SURGERY [facility]    KNEE UNICOMPARTMENTAL  12/23/2011    Performed by LATOYA CONNER at SURGERY [facility]    KNEE ARTHROSCOPY  12/23/2011    Performed by LATOYA CONNER at SURGERY [facility]    KNEE ARTHROSCOPY  05/03/2011    Performed by HANANE GOLDMAN at SURGERY SAME DAY [facility]    MENISCECTOMY, KNEE, MEDIAL  05/03/2011    Performed by HANANE GOLDMAN at SURGERY SAME DAY [facility]    OTHER  09/10/2010    RIGHT KIDNEY TRANSPLANT    KNEE ARTHROPLASTY TOTAL  01/12/2007    RIGHT    KNEE ARTHROSCOPY  04/10/2006    RIGHT    OTHER ORTHOPEDIC SURGERY  07/08/1974    LEFT KNEE DEBRIDEMENT     Social History     Tobacco Use    Smoking status: Never     Passive exposure: Never    Smokeless tobacco: Never   Substance Use Topics    Alcohol use: No     Chief Complaint   Patient presents with    Abnormal Labs     Pt sent by nephrology due to elevated BUN and creatinine along with electrolyte abnormalities.  Pt also endorses increased swelling to lower extremities.  Pt is currently receiving immunotherapy and radiation for squamous cell carcinoma.       Diagnosis: SANKET (acute kidney injury) (HCC) [N17.9]    Unit where seen by Wound Team: [id number]     WOUND CONSULT RELATED TO:  Left Axilla, Sacrum and BLE    WOUND TEAM PLAN OF CARE - Frequency of Follow-up:   Nursing to follow dressing orders written for wound care. Contact wound team if area fails to progress, deteriorates or with any questions/concerns if something comes up before next scheduled follow up (See below as to whether wound is following and  frequency of wound follow up)   Weekly - Left Axilla and Sacrum    WOUND HISTORY:   Pt is a 68yr old gentleman with history of PAD, CHF, A. Fib, AAA, Polycystic Kidney (S/p transplant and on chronic immunosuppression), HTN who now has Stage IV squamous cell carcinoma complicated by left axillary abscess s/p drain placement. Pt currently going to outpatient wound clinic for wet to dry dressing changes weekly. Wife performs dressing changes between appointments. Pt has dressing changed 2x daily. Wound team was consulted to assist with managing wound while inpatient.        WOUND ASSESSMENT/LDA          Wound 06/11/24 Soft Tissue Necrosis Axilla Left (Active)   Wound Image     06/26/24 1500   Site Assessment Red;Yellow;Painful    Periwound Assessment Red;Denuded    Margins Attached edges    Closure None    Drainage Amount Moderate    Drainage Description Yellow    Treatments Cleansed;Site care    Wound Cleansing Normal Saline Irrigation    Periwound Protectant Not Applicable    Dressing Status Clean;Dry;Intact    Dressing Changed Changed    Dressing Cleansing/Solutions Normal Saline    Dressing Options Moist Roll Gauze;Absorbent Abdominal Pad;Hypafix Tape    Dressing Change/Treatment Frequency Every Shift, and As Needed    NEXT Dressing Change/Treatment Date 06/26/24    NEXT Weekly Photo (Inpatient Only) 07/03/24    Wound Team Following Weekly    Non-staged Wound Description Full thickness    Wound Length (cm) 5.5 cm    Wound Width (cm) 10 cm    Wound Surface Area (cm^2) 55 cm^2    WOUND NURSE ONLY - Time Spent with Patient (mins) 60        Wound 06/12/24 Pressure Injury Coccyx;Sacrum unstageable POA (Active)   Wound Image    06/26/24 1500   Site Assessment Purple;Yellow    Periwound Assessment Intact    Margins Attached edges    Closure None    Drainage Amount Small    Drainage Description Serosanguineous    Treatments Cleansed;Site care;Offloading    Offloading/DME Other (comment)    Wound Cleansing Normal Saline  Irrigation    Periwound Protectant Barrier Paste    Dressing Status Open to Air    Dressing Changed Reapplied    Dressing Cleansing/Solutions Not Applicable    Dressing Options Open to Air    Dressing Change/Treatment Frequency As Needed    NEXT Dressing Change/Treatment Date 06/26/24    NEXT Weekly Photo (Inpatient Only) 07/03/24    Wound Team Following Weekly    WOUND NURSE ONLY - Pressure Injury Stage U    Wound Length (cm) 10.5 cm    Wound Width (cm) 5.1 cm    Wound Surface Area (cm^2) 53.55 cm^2        Wound 06/26/24 Pressure Injury Foot Dorsal Bilateral sDTI POA (Active)   Wound Image    06/26/24 1500   Site Assessment Light Purple;Purple;Red    Periwound Assessment Intact;Edema    Margins Attached edges    Closure None    Drainage Amount None    Treatments Offloading    Dressing Status Open to Air    NEXT Weekly Photo (Inpatient Only) 07/03/24    Wound Team Following Weekly    WOUND NURSE ONLY - Pressure Injury Stage DTPI    Wound Length (cm) 0.5 cm    Wound Width (cm) 13 cm    Wound Surface Area (cm^2) 6.5 cm^2                                                           Vascular:    MIRELLA:   No results found.    Lab Values:    Lab Results   Component Value Date/Time    WBC 11.1 (H) 06/26/2024 03:10 AM    RBC 3.83 (L) 06/26/2024 03:10 AM    HEMOGLOBIN 9.8 (L) 06/26/2024 03:10 AM    HEMATOCRIT 31.9 (L) 06/26/2024 03:10 AM    CREACTPROT 13.64 (H) 04/24/2024 03:30 PM    SEDRATEWES 34 (H) 04/24/2024 03:30 PM    HBA1C 11.2 (A) 05/23/2024 03:10 PM         Culture Results show:  No results found for this or any previous visit (from the past 720 hour(s)).    Pain Level/Medicated:  None, Tolerated without pain medication       INTERVENTIONS BY WOUND TEAM:  Chart and images reviewed. Discussed with bedside RN. All areas of concern (based on picture review, LDA review and discussion with bedside RN) have been thoroughly assessed. Documentation of areas based on significant findings. This RN in to assess patient. Performed  standard wound care which includes appropriate positioning, dressing removal and non-selective debridement. Pictures and measurements obtained weekly if/when required.    Wound:  Left Axilla Squamous cell carcinoma  Preparation for Dressing removal: Removed without difficulty  Cleansed/Non-selectively Debrided with:  Normal Saline and Gauze  Nadeen wound: Cleansed with Normal Saline and Gauze, Prepped with No Sting  Primary Dressing:  NS moistened roll gauze  Secondary (Outer) Dressing: Secured with ABD pads and hypafix tape     Wound:  Sacrococcygeal area POA Unstageable  Preparation for Dressing removal: Open to air  Cleansed/Non-selectively Debrided with:  Moist warm washcloth  Nadeen wound: Cleansed with Moist warm washcloth, Prepped with N/A  Primary Dressing:  Barrier paste  Secondary (Outer) Dressing: encouraged turns     Wound:  Right anterior ankle POA sDTI  Preparation for Dressing removal: Removed without difficulty  Cleansed/Non-selectively Debrided with:  N/A  Nadeen wound: Cleansed with N/A, Prepped with N/A  Primary Dressing:  Heel offloading dressings  Secondary (Outer) Dressing: Tubigrip F    Area Assessed:  Bilateral Ears  Area intact, gray foam in use     Area Assessed:  Bilateral Elbows  Area intact, left arm with lymphedema, tubigrip F in use    Area Assessed: Abdomen/Pannus  Area intact, intact    Area Assessed: Back  Area intact, large bruise noted to the right flank lower back area. Pts spouse states it is from last admission and heparin use but she feels it has worsened. Bedside RN aware and will monitor.    Area Assessed:  Bilateral I.T.s  Area intact, barrier paste    Area Assessed:  BLE  Area intact, scar tissue noted to the right anterior and posterior leg. Indentation noted to bilateral upper legs from compression stockings.    Area Assessed:  Bilateral ankles  Area intact, bruising noted to the right medial ankle/heel area and lateral foot    Area Assessed:  Bilateral heels  Area intact,  heel offloading dressings applied.    Advanced Wound Care Discharge Planning  Number of Clinicians necessary to complete wound care: 1  Is patient requiring IV pain medications for dressing changes:  No   Length of time for dressing change 30 min. (This does not include chart review, pre-medication time, set up, clean up or time spent charting.)    Interdisciplinary consultation: Patient, Bedside RN (Norma), Cornelia LOPEZ (Wound RN).  Pressure injury and staging reviewed with N/A.    EVALUATION / RATIONALE FOR TREATMENT:     Date:  06/26/24  Wound Status:  Initial evaluation    Pt with known cancerous wound to the left axilla. Pt has been doing NS wet to dry dressings at home and would like to stay with that dressing. Pt is very particular about care due to pain to the area and wife has been doing dressing changes at home. Pts sacrococcygeal area has a POA unstageable, per wife pt has spent the last couple weeks in a recliner chair due to difficulty breathing and has not been able to shower self. Pt declined any other dressings other than barrier paste. Barrier paste applied. Pt was educated on importance of turns. Pt is agreeable. Pts bilateral anterior ankles with sDTIs from home compression stockings. Stockings removed and tubigrip F applied for mild compression.          Goals: Steady decrease in wound area and depth weekly.    NURSING PLAN OF CARE ORDERS:  Dressing changes: See Dressing Care orders  RN Prevention Protocol    NUTRITION RECOMMENDATIONS   Wound Team Recommendations:  N/A    DIET ORDERS (From admission to next 24h)       Start     Ordered    06/26/24 1011  Diet Order Diet: Consistent CHO (Diabetic)  ALL MEALS        Question:  Diet:  Answer:  Consistent CHO (Diabetic)    06/26/24 1010                    PREVENTATIVE INTERVENTIONS:    Q shift Javad - performed per nursing policy  Q shift pressure point assessments - performed per nursing policy    Surface/Positioning  ICU Low Airloss - Currently in  Place  Reposition q 2 hours - Currently in Place    Offloading/Redistribution  Sacral offloading dressing (Silicone dressing) - Refused  Heel offloading dressing (Silicone dressing) - Currently in Place      Respiratory  Silicone O2 tubing - Currently in Place  Gray Foam Ear protectors - Currently in Place    Containment/Moisture Prevention    Dri-omid pad - Currently in Place  Barrier paste - Applied this Visit    Anticipated discharge plans:  Self/Family Care and Outpatient Wound Center        Vac Discharge Needs:  Vac Discharge plan is purely a recommendation from wound team and not a requirement for discharge unless otherwise stated by physician.  Not Applicable Pt not on a wound vac

## 2024-06-26 NOTE — ASSESSMENT & PLAN NOTE
Uncontrolled with ongoing hyperglycemia   Increase Lantus from 20 units every evening to 25 units, insulin sliding scale   Decreased steroids down to 20 mg of prednisone which is his recent baseline  Diabetic diet  HbA1c 11.2

## 2024-06-26 NOTE — ED NOTES
"Chief Complaint   Patient presents with    Abnormal Labs     Pt sent by nephrology due to elevated BUN and creatinine along with electrolyte abnormalities.  Pt also endorses increased swelling to lower extremities.  Pt is currently receiving immunotherapy and radiation for squamous cell carcinoma.       /87   Pulse (!) 101   Temp 36.3 °C (97.3 °F) (Temporal)   Resp 16   Ht 1.981 m (6' 6\")   Wt 98.9 kg (218 lb)   SpO2 94%   BMI 25.19 kg/m²     Pt brought straight back from the lobby.  Pt changed into gown and connected to monitor. EKG completed on arrival.  Pt has large cancerous wound underneath left arm pit and pt also has lymphedema to left arm.    "

## 2024-06-26 NOTE — OP THERAPY DAILY TREATMENT
Outpatient Physical Therapy  LYMPHEDEMA THERAPY DAILY TREATMENT     [facility] [address]  [address]  [place] [address]  Phone:  [phone]  Fax:  [phone]    Date: 06/26/2024    Patient: Faraz Altman  YOB: 1955  MRN: [id number]     Time Calculation                   Chief Complaint: No chief complaint on file.    Visit #: 2    Subjective    Lymphedema Objective      Exercises/Treatment  Time-based treatments/modalities:           LYMPHEDEMA ASSESSMENT AND PLAN

## 2024-06-26 NOTE — ASSESSMENT & PLAN NOTE
Poor renal function  Continue sirolimus, prednisone  Transplant kidney biopsy ordered was done 6/27  Nephrology consulted

## 2024-06-26 NOTE — ED NOTES
Med rec updated and complete. Allergies reviewed. Pt confirmed name and date of birth. Pt finished a 5 day course of Levofloxacin on 06/16/24.  All oral diabetic medications have been discontinued. Pt is currently only using Lantus insulin. Eliquis was also discontinued because pt has a watchman.  Pt takes an ASA 81 MG in the evening. Last dose 06/24/24.    Home pharmacy   [facility] = [phone]

## 2024-06-26 NOTE — CONSULTS
[facility] -  CONSULTATION NOTE               Author: Zachariah Hudson M.D. Date & Time: 6/26/2024  3:36 PM       REASON FOR CONSULTATION:   SANKET     CHIEF COMPLAINT:   Abnormal labs     HISTORY OF PRESENT ILLNESS:    Patient is a 68 year old male with a PMHx of CKD stage 4/5, S/P Living unrelated Renal transplant in 2010, PCKD, afib, CAD, HLD, HTN, and recently diagnosed squamous cell carcinoma of the left axillary region on immunotherapy via Dr. Fajardo, DM with last A1c of 6.9%, who was seen in Nepohrology clinic and sent to hospital for worsening renal failure and hyperkalemia. He complaints of worsening of shortness of breath, cough with white sputum, bilateral lower extremity edema Recent admissions to [facility] with similar presentation and was discharged just one week ago. Outpatient labs today showed a potassium of 6.0, BUN of 101, creatinine 3.7 and a glucose of 231. In the ER he was found to be hypotensive, blood pressure 83/60. Chest x-ray: Moderate sized bilateral pleural effusions, bibasilar opacities, moderate cardiomegaly. Scr from 6/5 was 2.89 and was 2.85 on discharge on 6/21/24. Nephrology was asked to consult for SANKET. He does have SOB.  No melena, hematochezia, hematemesis.  No HA, visual changes, or abdominal pain. + edema bilateral LE. His immunosupression was recently changed to Sirolimus after diagnosis of metastatic skin cancer.     REVIEW OF SYSTEMS:    10 point ROS was performed and is as per HPI or otherwise negative    PAST MEDICAL HISTORY:   Past Medical History:   Diagnosis Date    A-fib (HCC)     Arrhythmia     Benign essential hypertension     Diabetes (HCC)     type 2    Heart burn     Hemorrhagic disorder (HCC)     Eliquis    Hyperlipoproteinemia     Hypertension     not on meds anymore    Indigestion     Myocardial infarct (HCC) 2013    stent    Polycystic kidney 09/10/2010    RIGHT KIDNEY TRANSPLANT    Presence of Watchman left atrial appendage closure  "device 02/29/2024    Sleep apnea 01/30/2024    states he was told he had sleep apnea but has not had a sleep study    Snoring        PAST SURGICAL HISTORY:   Past Surgical History:   Procedure Laterality Date    STENT PLACEMENT  2013    cardiac    KNEE MANIPULATION  02/16/2012    Performed by LATOYA CONNER at SURGERY [facility]    KNEE UNICOMPARTMENTAL  12/23/2011    Performed by LATOYA CONNER at SURGERY [facility]    KNEE ARTHROSCOPY  12/23/2011    Performed by LATOYA CONNER at SURGERY [facility]    KNEE ARTHROSCOPY  05/03/2011    Performed by HANANE GOLDMAN at SURGERY SAME DAY [facility]    MENISCECTOMY, KNEE, MEDIAL  05/03/2011    Performed by HANANE GOLDMAN at SURGERY SAME DAY [facility]    OTHER  09/10/2010    RIGHT KIDNEY TRANSPLANT    KNEE ARTHROPLASTY TOTAL  01/12/2007    RIGHT    KNEE ARTHROSCOPY  04/10/2006    RIGHT    OTHER ORTHOPEDIC SURGERY  07/08/1974    LEFT KNEE DEBRIDEMENT       FAMILY HISTORY:   No family history on file.    SOCIAL HISTORY:   Social History     Tobacco Use   Smoking Status Never    Passive exposure: Never   Smokeless Tobacco Never     Social History     Substance and Sexual Activity   Alcohol Use No     Social History     Substance and Sexual Activity   Drug Use No       HOME MEDICATIONS:   Reviewed and documented in chart    LABORATORY STUDIES:   Recent Labs     06/24/24  0825 06/25/24  1903 06/26/24  0310   SODIUM 132* 130* 131*   POTASSIUM 6.0* 6.5* 5.9*   CHLORIDE 92* 94* 92*   CO2 24 21 26   GLUCOSE 231* 362* 286*   * 114* 109*   CREATININE 3.71* 3.68* 3.46*   CALCIUM 9.1 9.1 8.9       ALLERGIES:  Aceinhibitors [ace inhibitors], Angiotensin receptor blockers, and Doxycycline    VS:  /59   Pulse 68   Temp 35.9 °C (96.7 °F) (Temporal)   Resp 14   Ht 1.981 m (6' 5.99\")   Wt 103 kg (227 lb 15.3 oz)   SpO2 91%   BMI 26.35 kg/m²     Physical Exam  Vitals and nursing note reviewed.   Constitutional:       General: He is not in acute " distress.     Appearance: He is obese. He is ill-appearing.   HENT:      Head: Normocephalic and atraumatic.      Nose: Nose normal.      Mouth/Throat:      Mouth: Mucous membranes are moist.      Pharynx: Oropharynx is clear.   Eyes:      Extraocular Movements: Extraocular movements intact.      Conjunctiva/sclera: Conjunctivae normal.      Pupils: Pupils are equal, round, and reactive to light.   Cardiovascular:      Rate and Rhythm: Normal rate and regular rhythm.   Pulmonary:      Effort: No respiratory distress.      Breath sounds: Rales present.   Abdominal:      General: Abdomen is flat. Bowel sounds are normal.      Palpations: Abdomen is soft.   Musculoskeletal:      Cervical back: Normal range of motion and neck supple.      Right lower leg: Edema present.      Left lower leg: Edema present.   Neurological:      General: No focal deficit present.      Mental Status: He is alert and oriented to person, place, and time. Mental status is at baseline.   Psychiatric:         Mood and Affect: Mood normal.         Behavior: Behavior normal.         Thought Content: Thought content normal.         Judgment: Judgment normal.         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 ATN vs. AIN (being on immunotherapy)     - Low risk of Tumor lysis in skin cancer    # S/P Living unrelated Renal transplant in 2010     - Continue with home IS medications  # h/o ADPKD  # Acute hyperkalemia  # Acute Resp failure with hypoxia due to acute on chronic pulmonary edema  # Acidosis  # Chronic Immunosuppression on medications (sirolimus and prednisone)  # Type 2 DM - last A1c was 6.9%  # Proteinuria  # Atrial fibrillation  # Metastatic Squamous Cell Ca - getting immunotherapy/followed by Dr. Fajardo. He has mets to lymph nodes. On  Cemiplib x 3 doses   # Anemia   # Chronic Thrombocytopenia      PLAN:  - There is no acute need for RRT but may need to start dialysis this admission if renal functions do not improve with medical management   - Renal transplant biopsy for prognostication - has thrombocytopenia but platelets >50K  - Stop Diuretics   - Start gentle hydration with IV Bicarb drip @ 50 cc/hr   - Start K binders - Lokelma 10 grams pO daily   - Start Allopurinol 100 mg PO daily   - Continue Midodrine 10 mg PO TID   - Continue Sirolimus at current dose, Continue IV Hydrocortisone   - Low salt diet  - Dose all meds per eGFR     Discussed with Dr. Fajardo. Agreeable with transplant kidney biopsy, Low risk of Tumor lysis in skin cancer   Discussed with Dr. Faulkner   High Complexity management and decision making       Thank you for the consultation and we will follow closely!

## 2024-06-26 NOTE — ED PROVIDER NOTES
ED Provider Note    CHIEF COMPLAINT  Chief Complaint   Patient presents with    Abnormal Labs     Pt sent by nephrology due to elevated BUN and creatinine along with electrolyte abnormalities.  Pt also endorses increased swelling to lower extremities.  Pt is currently receiving immunotherapy and radiation for squamous cell carcinoma.         EXTERNAL RECORDS REVIEWED  Outpatient Notes patient was seen at the wound care center today for care of a full-thickness wound to his left axilla.  He is diagnosed with squamous cell carcinoma he has a history of peripheral artery disease CHF atrial fibrillation abdominal aortic aneurysm polycystic kidney disease status posttransplant on chronic immunosuppression hypertension he developed an axillary abscess over his squamous cell carcinoma and had a drain placed he has had heavy drainage with his axillary necrotic fungating tumor he did have lab work drawn earlier yesterday morning at 8:30 and was found to have a potassium of 6 and a BUN of 101 with a creatinine of 3.71 which indicates worsening renal failure he was sent here for further evaluation.    Patient was admitted to the hospital 6/11/2024 to 6/21/2024.  He was just just discharged home on this last Friday.  He has a history of atrial fibrillation and ejection fraction of 45% with congestive heart failure and was placed on IV Lasix in this hospital visit.  He still went home and a heavy weight of 248.  He is currently is taking Lasix 80 mg daily     HPI/ROS  LIMITATION TO HISTORY   Select: : None  OUTSIDE HISTORIAN(S):  Significant other patient's wife confirms that he was just discharged home from the hospital on Friday and that he still has a lot of fluid buildup in his legs.  She states any fluid he gets IV is very hard to get off.    Jama Altman is a 68 y.o. male who presents due to abnormal lab work done yesterday morning.  The patient currently is taking oral Lasix 40 mg twice daily for congestive heart  failure and volume overload.  He is on oxygen intermittently at home and has been requiring it lately.  He states that he still weighs 248 which is more than his normal weight.  He denies any chest pain but does feel short of breath.  He has pain in his arm secondary to squamous cell carcinoma.  He denies any fevers or chills.  He states he is coughing up white phlegm.  His white count is elevated at 12.1 hemoglobin 11.5 platelet count 405 with 81% neutrophils.  His lab work done yesterday morning had a sodium 132 potassium 6.0 glucose 231  with a creatinine of 3.7 which is worse than his normal.  The patient was called by his nephrologist to come into the hospital for further evaluation.  His nephrologist is Dr. León.    PAST MEDICAL HISTORY   has a past medical history of A-fib (HCC), Arrhythmia, Benign essential hypertension, Diabetes (HCC), Heart burn, Hemorrhagic disorder (HCC), Hyperlipoproteinemia, Hypertension, Indigestion, Myocardial infarct (HCC) (2013), Polycystic kidney (09/10/2010), Presence of Watchman left atrial appendage closure device (02/29/2024), Sleep apnea (01/30/2024), and Snoring.    SURGICAL HISTORY   has a past surgical history that includes knee arthroplasty total (01/12/2007); knee arthroscopy (04/10/2006); other orthopedic surgery (07/08/1974); other (09/10/2010); knee arthroscopy (05/03/2011); meniscectomy, knee, medial (05/03/2011); knee unicompartmental (12/23/2011); knee arthroscopy (12/23/2011); knee manipulation (02/16/2012); and stent placement (2013).    FAMILY HISTORY  No family history on file.    SOCIAL HISTORY  Social History     Tobacco Use    Smoking status: Never     Passive exposure: Never    Smokeless tobacco: Never   Vaping Use    Vaping status: Never Used   Substance and Sexual Activity    Alcohol use: No    Drug use: No    Sexual activity: Yes     Partners: Female       CURRENT MEDICATIONS  Home Medications       Reviewed by Cornelia Winkler R.N.  "(Registered Nurse) on 06/25/24 at 1841  Med List Status: Not Addressed     Medication Last Dose Status   acetaminophen-codeine #3 (TYLENOL #3) 300-30 MG Tab  Active   aspirin 81 MG EC tablet  Active   atorvastatin (LIPITOR) 80 MG tablet  Active   benzonatate (TESSALON) 200 MG capsule  Active   fluticasone-salmeterol (WIXELA INHUB) 250-50 MCG/ACT AEROSOL POWDER, BREATH ACTIVATED  Active   furosemide (LASIX) 40 MG Tab  Active   gabapentin (NEURONTIN) 100 MG Cap  Active   insulin glargine 100 UNIT/ML Solution Pen-injector injection  Active   Insulin Pen Needle 32 G x 4 mm  Active   metoprolol SR (TOPROL XL) 100 MG TABLET SR 24 HR  Active   midodrine (PROAMATINE) 10 MG tablet  Active   omeprazole (PRILOSEC) 20 MG delayed-release capsule  Active   predniSONE (DELTASONE) 10 MG Tab  Active   Sirolimus 2 MG Tab  Active                  Audit from Redirected Encounters    **Home medications have not yet been reviewed for this encounter**         ALLERGIES  Allergies   Allergen Reactions    Aceinhibitors [Ace Inhibitors] Unspecified     SANKET on CKD, S/P renal transplant. On midodrine. ACE contraindicated with current SCr.     Angiotensin Receptor Blockers Unspecified     SANKET on CKD, S/P renal transplant. On midodrine. ARB contraindicated with current SCr.       Doxycycline Rash     Sweats and shakes: 9/28/17: Clarified allergy with patient. Allergy was in 1998 and he doesn't remember what happened. He thought the medication is for pain.  Tolerates doxycycline 9/2017       PHYSICAL EXAM  VITAL SIGNS: /87   Pulse (!) 101   Temp 36.3 °C (97.3 °F) (Temporal)   Resp 16   Ht 1.981 m (6' 6\")   Wt 98.9 kg (218 lb)   SpO2 94%   BMI 25.19 kg/m²      Constitutional: Well developed, Well nourished, uncomfortable, Non-toxic appearance.   HEENT: Normocephalic, Atraumatic,  external ears normal, pharynx pink,  Mucous  Membranes moist, No rhinorrhea or mucosal edema  Eyes: PERRL, EOMI, Conjunctiva normal, No discharge.   Neck: " Normal range of motion, No tenderness, Supple, No stridor.   Lymphatic: No lymphadenopathy    Cardiovascular: Irregularly irregular rate and rhythm no murmurs,  rubs, or gallops.   Thorax & Lungs: Decreased breath sounds in the bases no respiratory distress mild hypoxia but speaking full sentences without accessory muscle use, No wheezes, rhales or rhonchi, No chest wall tenderness.   Abdomen: Bowel sounds normal, Soft, non tender, non distended,  No pulsatile masses., no rebound guarding or peritoneal signs.   Skin: Warm, Dry, No erythema, No rash,   Back:  No CVA tenderness,  No spinal tenderness, bony crepitance step offs or instability.   Extremities: Equal, intact distal pulses, No cyanosis, clubbing bilateral 2+ lower extremity edema,  No tenderness.   Musculoskeletal: Good range of motion in all major joints. No tenderness to palpation or major deformities noted.  Patient's left arm is wrapped in a bandage from the shoulder down to the hand and is diffusely edematous this is chronic for him as this is where he has his axillary squamous cell carcinoma he has bruising to his right upper arm from IV from his previous visit last week he has bilateral lower extremity edema 2+  Neurologic: Alert & oriented No focal deficits noted.  Psychiatric: Affect normal, Judgment normal, Mood normal.      EKG/LABS  Results for orders placed or performed during the hospital encounter of 06/25/24   CBC WITH DIFFERENTIAL   Result Value Ref Range    WBC 13.0 (H) 4.8 - 10.8 K/uL    RBC 4.10 (L) 4.70 - 6.10 M/uL    Hemoglobin 10.5 (L) 14.0 - 18.0 g/dL    Hematocrit 34.4 (L) 42.0 - 52.0 %    MCV 83.9 81.4 - 97.8 fL    MCH 25.6 (L) 27.0 - 33.0 pg    MCHC 30.5 (L) 32.3 - 36.5 g/dL    RDW 50.8 (H) 35.9 - 50.0 fL    Platelet Count 96 (L) 164 - 446 K/uL    Neutrophils-Polys 94.10 (H) 44.00 - 72.00 %    Lymphocytes 0.90 (L) 22.00 - 41.00 %    Monocytes 2.80 0.00 - 13.40 %    Eosinophils 0.00 0.00 - 6.90 %    Basophils 0.20 0.00 - 1.80 %     Immature Granulocytes 2.00 (H) 0.00 - 0.90 %    Nucleated RBC 0.20 0.00 - 0.20 /100 WBC    Neutrophils (Absolute) 12.22 (H) 1.82 - 7.42 K/uL    Lymphs (Absolute) 0.12 (L) 1.00 - 4.80 K/uL    Monos (Absolute) 0.36 0.00 - 0.85 K/uL    Eos (Absolute) 0.00 0.00 - 0.51 K/uL    Baso (Absolute) 0.02 0.00 - 0.12 K/uL    Immature Granulocytes (abs) 0.26 (H) 0.00 - 0.11 K/uL    NRBC (Absolute) 0.03 K/uL   Comp Metabolic Panel   Result Value Ref Range    Sodium 130 (L) 135 - 145 mmol/L    Potassium 6.5 (H) 3.6 - 5.5 mmol/L    Chloride 94 (L) 96 - 112 mmol/L    Co2 21 20 - 33 mmol/L    Anion Gap 15.0 7.0 - 16.0    Glucose 362 (H) 65 - 99 mg/dL    Bun 114 (H) 8 - 22 mg/dL    Creatinine 3.68 (H) 0.50 - 1.40 mg/dL    Calcium 9.1 8.5 - 10.5 mg/dL    Correct Calcium 9.9 8.5 - 10.5 mg/dL    AST(SGOT) 24 12 - 45 U/L    ALT(SGPT) 22 2 - 50 U/L    Alkaline Phosphatase 87 30 - 99 U/L    Total Bilirubin 0.7 0.1 - 1.5 mg/dL    Albumin 3.0 (L) 3.2 - 4.9 g/dL    Total Protein 5.7 (L) 6.0 - 8.2 g/dL    Globulin 2.7 1.9 - 3.5 g/dL    A-G Ratio 1.1 g/dL   ESTIMATED GFR   Result Value Ref Range    GFR (CKD-EPI) 17 (A) >60 mL/min/1.73 m 2   IMMATURE PLT FRACTION   Result Value Ref Range    Imm. Plt Fraction 15.2 (H) 0.6 - 13.1 %   EKG   Result Value Ref Range    Report       [facility] Emergency Dept.    Test Date:  2024  Pt Name:    CIARA FORRESTER                   Department: ER  MRN:        [id number]                      Room:        07  Gender:     Male                         Technician: [id number]  :        [id number]                   Requested By:ER TRIAGE PROTOCOL  Order #:    [id number]                    Reading MD: SYLVIA BILLY MD    Measurements  Intervals                                Axis  Rate:       99                           P:          0  UT:         0                            QRS:        175  QRSD:       162                          T:          10  QT:         361  QTc:        464    Interpretive  Statements  Atrial fibrillation  Right bundle branch block  Compared to ECG 06/11/2024 10:38:32  Right bundle-branch block now present  Intraventricular conduction delay no longer present  ST (T wave) deviation no longer present  Electronically Signed On 06- 19:10:58 PDT by SYLVIA BILLY MD     POCT glucose device results   Result Value Ref Range    POC Glucose, Blood 390 (H) 65 - 99 mg/dL       I have independently interpreted this EKG    RADIOLOGY/PROCEDURES   I have independently interpreted the diagnostic imaging associated with this visit and am waiting the final reading from the radiologist.   My preliminary interpretation is as follows:cxr moderate bilateral pleural effusions with atelectasis and cardiomegaly    Radiologist interpretation:  DX-CHEST-PORTABLE (1 VIEW)   Final Result      1.  Moderate-sized bilateral pleural effusions      2.  Bibasilar airspace opacities consistent with atelectasis, pneumonitis, and/or pleural effusion..      3.  Moderate cardiomegaly.          COURSE & MEDICAL DECISION MAKING    ASSESSMENT, COURSE AND PLAN  Care Narrative: Jama Altman is a 68 y.o. male who presents due to abnormal lab work done yesterday morning.  The patient currently is taking oral Lasix 40 mg twice daily for congestive heart failure and volume overload he has a kidney transplant and is taking immunosuppressive medications and is immunocompromised.  He is on oxygen intermittently at home and has been requiring it lately.  He states that he still weighs 248 which is more than his normal weight.  He denies any chest pain but does feel short of breath.  He has pain in his arm secondary to squamous cell carcinoma.  He denies any fevers or chills.  He states he is coughing up white phlegm.  His white count is elevated at 12.1 hemoglobin 11.5 platelet count 405 with 81% neutrophils.  His lab work done yesterday morning had a sodium 132 potassium 6.0 glucose 231  with a creatinine of 3.7 which  is worse than his normal.  The patient was called by his nephrologist to come into the hospital for further evaluation.  His nephrologist is Dr. León.  On physical exam he is requiring oxygen 2 L nasal cannula heart is regular rate and rhythm lungs are clear to auscultation bilaterally abdomen is soft he has lower extremity edema from his knees down to plus his left arm is in a bandage due to his squamous cell carcinoma.            ADDITIONAL PROBLEMS MANAGED  Congestive heart failure  Squamous cell carcinoma of the left axilla    DISPOSITION AND DISCUSSIONS  Patient's blood pressure initially was low at 87 systolic he states that it is always low when he takes midodrine 3 times daily, he has not taken it this evening.  He would like some pain medication.  I ordered morphine and some midodrine for him to help with his blood pressure.    The patient's sodium here is 130 his potassium is even higher at 6.5 glucose elevated at 362  with a creatinine of 3.68.  Liver function tests are normal.  I ordered calcium chloride, bicarb and dextrose and insulin for the hyperkalemia protocol.    I spoke with Dr. León nephrology who would like him on a 150 milliequivalent bicarb drip at 150 cc/h overnight with a dose of IV Lasix 40 mg.  If he has a poor response they will start dialysis tomorrow.  I will admit the patient to the intermediate care unit he understands his need for admission and understands his condition at this time.    The patient's white blood cell count is elevated at 13 hemoglobin 10.5 platelet count 96 with 94% neutrophils he has 2 immature granulocytes.  I spoke with Dr. Elliott and antibiotics were considered but not given.  He states they will look at his axillary wound upstairs and get cultures and I ordered blood cultures x 2 down here.  Chest x-ray shows moderate bilateral pleural effusions with bilateral airspace opacities consistent with atelectasis and he has moderate cardiomegaly.  He is  not requiring a lot of oxygen and possibly could get IR drainage of his pleural effusions tomorrow to help with his breathing.  EKG shows atrial fibrillation with a right bundle branch block.    Patient will be admitted in serious condition.  I spoke with the intensivist Dr. Dawkins who agrees he is appropriate for [facility] admission    I spoke with the hospitalist Dr. Harmon who will write admission orders to the [facility].      I have discussed management of the patient with the following physicians and LINDA's: Nephrology Dr. León who wants repeat lab work and will see him in the morning and will certainly come in and treat anything emergent..  Intensivist   Hospitalist       Discussion of management with other [facility] or appropriate source(s): None     Escalation of care considered, and ultimately not performed:IV fluids were considered but I want nephrology to weigh in on this because the patient has congestive heart failure and still feels as if he is retaining fluid and feels short of breath,   A biotics were considered but we will get blood cultures and evaluate the wound in the hospital and defer antibiotics at this time.    Barriers to care at this time, including but not limited to: none.     Decision tools and prescription drugs considered including, but not limited to: Pain Medications morphine .    CRITICAL CARE  The very real possibilty of a deterioration of this patient's condition required the highest level of my preparedness for sudden, emergent intervention.  I provided critical care services, which included medication orders, frequent reevaluations of the patient's condition and response to treatment, ordering and reviewing test results, and discussing the case with various consultants.  The critical care time associated with the care of the patient was 45 minutes. Review chart for interventions. This time is exclusive of any other billable procedures.     Patient will be admitted in  serious condition.  FINAL DIAGNOSIS  1. SANKET (acute kidney injury) (HCC)    2. Hyperkalemia    3. Chronic bilateral pleural effusions    4. Hypoxia    5. Chronic hypotension    6. Squamous cell carcinoma, arm, left    7. Open wound of left axillary region, initial encounter           Electronically signed by: Batsheva Thomas M.D., 6/25/2024 7:07 PM

## 2024-06-26 NOTE — ASSESSMENT & PLAN NOTE
Acute respiratory failure for which he benefited from a left-sided thoracentesis with 2 L off.  On 6/30/2024 he was in respiratory distress requiring nonrebreather and had another 1.7 L off the left lung.  He also has pulmonary edema.  80 mg IV Lasix ordered  Consideration for diuresis though limited by his kidney disease versus dialysis  Chest xray on 6/30 reveals bilateral pleural effusions, pulmonary edema   Transfer to [facility] with continuous pulse oximetry and low threshold for right-sided thoracentesis

## 2024-06-26 NOTE — PROGRESS NOTES
4 Eyes Skin Assessment Completed by LAN Edwards and Micky RN.    Head WDL  Ears WDL  Nose WDL  Mouth WDL  Neck Redness and Bruising petichiae L side   Breast/Chest Redness and Bruising  Shoulder Blades WDL  Spine WDL  (R) Arm/Elbow/Hand Redness, Bruising, Discoloration, and Edema  (L) Arm/Elbow/Hand Redness, Bruising, Discoloration, and Edema  Abdomen Bruising  Groin WDL  Scrotum/Coccyx/Buttocks Redness, Non-Blanching, Excoriation, and Discoloration open wound   (R) Leg Redness, Blanching, Bruising, Swelling, and Edema  (L) Leg Redness, Blanching, Scar, Bruising, and Edema L armpit SCC spot, pt states he had wound care on it today and doesn't want dressing changed till morning, compression dressing on from wound clinic, pt requested us to not take off stocking, pt educated on importance of monitoring skin while in hospital   (R) Heel/Foot/Toe Redness, Blanching, Discoloration, Bruising, and Edema  (L) Heel/Foot/Toe Redness, Blanching, Discoloration, Bruising, Swelling, and Edema          Devices In Places ECG, Blood Pressure Cuff, Pulse Ox, SCD's, Nasal Cannula, and Compression Dressing      Interventions In Place NC W/Ear Foams, Sacral Mepilex, Pillows, Low Air Loss Mattress, and Heels Loaded W/Pillows    Possible Skin Injury Yes    Pictures Uploaded Into Epic Yes  Wound Consult Placed Yes  RN Wound Prevention Protocol Ordered Yes

## 2024-06-26 NOTE — DISCHARGE PLANNING
Care Transition Team Assessment    Admission Date: 6/25/2024  GMLOS: 3.9  ALOS: 1    6-Clicks ADL Score:    6-Clicks Mobility Score:      Anticipated Discharge Dispo: Discharge Disposition: Discharged to home/self care (01)    DME Needed: No    Action(s) Taken:     Chart reviewed and pt discussed in IDT rounds. Pt previously discharged home on 6/21 and readmitted on 6/25 for SANKET and electrolyte abnormality. Pt is currently receiving immunotherapy and radiation for squamous cell carcinoma.     Pt is currently alert and oriented and on 6L oxymask. Pt lives in a one-story house in [place], [place]. Pt reported he has an advance directive and his son, Afshin [phone], is his DPOA (no ACP documents on file). Pt is insured with [facility]. Pt is independent with all ADLs/IADLs and does not use any DME at baseline. [facility] Pt denies alcohol or drug use.     Escalations Completed: None    Medically Clear: No    Next Steps: RN CM to assist with DC needs    Barriers to Discharge: Medical clearance    Information Source  Orientation Level: Oriented X4  Information Given By: Patient  Informant's Name: Faraz Altman  Who is responsible for making decisions for patient? : Patient    Readmission Evaluation  Is this a readmission?: Yes - unplanned readmission  Why do you think you were readmitted?: complex medical needs    Elopement Risk  Legal Hold: No  Ambulatory or Self Mobile in Wheelchair: No-Not an Elopement Risk    Interdisciplinary Discharge Planning  Patient or legal guardian wants to designate a caregiver: No    Discharge Preparedness  What is your plan after discharge?: Uncertain - pending medical team collaboration  What are your discharge supports?: Child  Prior Functional Level: Independent with Activities of Daily Living    Functional Assesment  Prior Functional Level: Independent with Activities of Daily Living    Finances  Financial Barriers to Discharge: No  Prescription Coverage:  Yes    Vision / Hearing Impairment  Vision Impairment : No  Hearing Impairment : No    Advance Directive  Advance Directive?: DPOA for Health Care  Durable Power of  Name and Contact : Afshin Altman (son)    Domestic Abuse  Have you ever been the victim of abuse or violence?: No  Possible Abuse/Neglect Reported to:: Not Applicable    Discharge Risks or Barriers  Discharge risks or barriers?: Complex medical needs  Patient risk factors: Complex medical needs, Readmission    Anticipated Discharge Information  Discharge Disposition: Discharged to home/self care (01)

## 2024-06-26 NOTE — ED NOTES
Bedside report to LAN Holcomb.  Pt sitting on gurney, connected to monitor. Pt on 4L NC connected to wall suction.  Call light within reach.

## 2024-06-27 ENCOUNTER — APPOINTMENT (OUTPATIENT)
Dept: CARDIOLOGY | Facility: MEDICAL CENTER | Age: 69
End: 2024-06-27
Payer: MEDICARE

## 2024-06-27 LAB
1 3 BETA D GLUCAN INTERP Q4483: ABNORMAL
1,3 BETA GLUCAN SER-MCNC: 64 PG/ML
ANION GAP SERPL CALC-SCNC: 9 MMOL/L (ref 7–16)
BACTERIA BLD CULT: NORMAL
BACTERIA BLD CULT: NORMAL
BASOPHILS # BLD AUTO: 0.1 % (ref 0–1.8)
BASOPHILS # BLD: 0.01 K/UL (ref 0–0.12)
BUN SERPL-MCNC: 111 MG/DL (ref 8–22)
CALCIUM SERPL-MCNC: 8.4 MG/DL (ref 8.5–10.5)
CHLORIDE SERPL-SCNC: 92 MMOL/L (ref 96–112)
CO2 SERPL-SCNC: 30 MMOL/L (ref 20–33)
CREAT SERPL-MCNC: 3.21 MG/DL (ref 0.5–1.4)
EOSINOPHIL # BLD AUTO: 0 K/UL (ref 0–0.51)
EOSINOPHIL NFR BLD: 0 % (ref 0–6.9)
ERYTHROCYTE [DISTWIDTH] IN BLOOD BY AUTOMATED COUNT: 50.8 FL (ref 35.9–50)
GFR SERPLBLD CREATININE-BSD FMLA CKD-EPI: 20 ML/MIN/1.73 M 2
GLUCOSE BLD STRIP.AUTO-MCNC: 186 MG/DL (ref 65–99)
GLUCOSE BLD STRIP.AUTO-MCNC: 223 MG/DL (ref 65–99)
GLUCOSE BLD STRIP.AUTO-MCNC: 223 MG/DL (ref 65–99)
GLUCOSE SERPL-MCNC: 291 MG/DL (ref 65–99)
HCT VFR BLD AUTO: 30.3 % (ref 42–52)
HGB BLD-MCNC: 9.2 G/DL (ref 14–18)
IMM GRANULOCYTES # BLD AUTO: 0.14 K/UL (ref 0–0.11)
IMM GRANULOCYTES NFR BLD AUTO: 1.7 % (ref 0–0.9)
LYMPHOCYTES # BLD AUTO: 0.11 K/UL (ref 1–4.8)
LYMPHOCYTES NFR BLD: 1.3 % (ref 22–41)
MCH RBC QN AUTO: 25.3 PG (ref 27–33)
MCHC RBC AUTO-ENTMCNC: 30.4 G/DL (ref 32.3–36.5)
MCV RBC AUTO: 83.2 FL (ref 81.4–97.8)
MONOCYTES # BLD AUTO: 0.45 K/UL (ref 0–0.85)
MONOCYTES NFR BLD AUTO: 5.3 % (ref 0–13.4)
MYCOBACTERIUM SPEC CULT: NORMAL
NEUTROPHILS # BLD AUTO: 7.76 K/UL (ref 1.82–7.42)
NEUTROPHILS NFR BLD: 91.6 % (ref 44–72)
NRBC # BLD AUTO: 0.03 K/UL
NRBC BLD-RTO: 0.4 /100 WBC (ref 0–0.2)
PATH REV: NORMAL
PATH REV: NORMAL
PATHOLOGY CONSULT NOTE: NORMAL
PHOSPHATE SERPL-MCNC: 5.2 MG/DL (ref 2.5–4.5)
PLATELET # BLD AUTO: 79 K/UL (ref 164–446)
PLATELETS.RETICULATED NFR BLD AUTO: 12.3 % (ref 0.6–13.1)
PMV BLD AUTO: 12.4 FL (ref 9–12.9)
POTASSIUM SERPL-SCNC: 5.6 MMOL/L (ref 3.6–5.5)
RBC # BLD AUTO: 3.64 M/UL (ref 4.7–6.1)
RHODAMINE-AURAMINE STN SPEC: NORMAL
RHODAMINE-AURAMINE STN SPEC: NORMAL
SIGNIFICANT IND 70042: NORMAL
SITE SITE: NORMAL
SODIUM SERPL-SCNC: 131 MMOL/L (ref 135–145)
SOURCE SOURCE: NORMAL
URATE SERPL-MCNC: 12 MG/DL (ref 2.5–8.3)
WBC # BLD AUTO: 8.5 K/UL (ref 4.8–10.8)

## 2024-06-27 PROCEDURE — 80048 BASIC METABOLIC PNL TOTAL CA: CPT

## 2024-06-27 PROCEDURE — 700102 HCHG RX REV CODE 250 W/ 637 OVERRIDE(OP): Performed by: INTERNAL MEDICINE

## 2024-06-27 PROCEDURE — 85055 RETICULATED PLATELET ASSAY: CPT

## 2024-06-27 PROCEDURE — A9270 NON-COVERED ITEM OR SERVICE: HCPCS | Performed by: INTERNAL MEDICINE

## 2024-06-27 PROCEDURE — 700111 HCHG RX REV CODE 636 W/ 250 OVERRIDE (IP): Performed by: INTERNAL MEDICINE

## 2024-06-27 PROCEDURE — 84100 ASSAY OF PHOSPHORUS: CPT

## 2024-06-27 PROCEDURE — 88300 SURGICAL PATH GROSS: CPT

## 2024-06-27 PROCEDURE — 700101 HCHG RX REV CODE 250: Performed by: INTERNAL MEDICINE

## 2024-06-27 PROCEDURE — 770000 HCHG ROOM/CARE - INTERMEDIATE ICU *

## 2024-06-27 PROCEDURE — 4401636 CT-NEEDLE CORE BX-RENAL

## 2024-06-27 PROCEDURE — 85025 COMPLETE CBC W/AUTO DIFF WBC: CPT

## 2024-06-27 PROCEDURE — 84550 ASSAY OF BLOOD/URIC ACID: CPT

## 2024-06-27 PROCEDURE — 99233 SBSQ HOSP IP/OBS HIGH 50: CPT | Performed by: HOSPITALIST

## 2024-06-27 PROCEDURE — 0TB13ZX EXCISION OF LEFT KIDNEY, PERCUTANEOUS APPROACH, DIAGNOSTIC: ICD-10-PCS | Performed by: RADIOLOGY

## 2024-06-27 PROCEDURE — 82962 GLUCOSE BLOOD TEST: CPT | Mod: 91

## 2024-06-27 RX ORDER — MIDAZOLAM HYDROCHLORIDE 1 MG/ML
.5-2 INJECTION INTRAMUSCULAR; INTRAVENOUS PRN
Status: ACTIVE | OUTPATIENT
Start: 2024-06-27 | End: 2024-06-27

## 2024-06-27 RX ORDER — SODIUM CHLORIDE 9 MG/ML
500 INJECTION, SOLUTION INTRAVENOUS
Status: ACTIVE | OUTPATIENT
Start: 2024-06-27 | End: 2024-06-27

## 2024-06-27 RX ORDER — PREDNISONE 20 MG/1
20 TABLET ORAL DAILY
Status: DISCONTINUED | OUTPATIENT
Start: 2024-06-28 | End: 2024-06-28

## 2024-06-27 RX ORDER — ONDANSETRON 2 MG/ML
4 INJECTION INTRAMUSCULAR; INTRAVENOUS PRN
Status: ACTIVE | OUTPATIENT
Start: 2024-06-27 | End: 2024-06-27

## 2024-06-27 RX ADMIN — INSULIN HUMAN 4 UNITS: 100 INJECTION, SOLUTION PARENTERAL at 12:28

## 2024-06-27 RX ADMIN — ACETAMINOPHEN 650 MG: 325 TABLET, FILM COATED ORAL at 03:42

## 2024-06-27 RX ADMIN — INSULIN GLARGINE-YFGN 20 UNITS: 100 INJECTION, SOLUTION SUBCUTANEOUS at 17:14

## 2024-06-27 RX ADMIN — INSULIN HUMAN 3 UNITS: 100 INJECTION, SOLUTION PARENTERAL at 17:14

## 2024-06-27 RX ADMIN — INSULIN HUMAN 4 UNITS: 100 INJECTION, SOLUTION PARENTERAL at 07:48

## 2024-06-27 RX ADMIN — SIROLIMUS 4 MG: 0.5 TABLET ORAL at 05:08

## 2024-06-27 RX ADMIN — HYDROCORTISONE SODIUM SUCCINATE 50 MG: 100 INJECTION, POWDER, FOR SOLUTION INTRAMUSCULAR; INTRAVENOUS at 14:11

## 2024-06-27 RX ADMIN — GABAPENTIN 200 MG: 100 CAPSULE ORAL at 17:06

## 2024-06-27 RX ADMIN — MIDODRINE HYDROCHLORIDE 10 MG: 5 TABLET ORAL at 12:29

## 2024-06-27 RX ADMIN — GABAPENTIN 200 MG: 100 CAPSULE ORAL at 05:07

## 2024-06-27 RX ADMIN — MIDODRINE HYDROCHLORIDE 10 MG: 5 TABLET ORAL at 17:06

## 2024-06-27 RX ADMIN — MIDODRINE HYDROCHLORIDE 10 MG: 5 TABLET ORAL at 07:48

## 2024-06-27 RX ADMIN — ATORVASTATIN CALCIUM 80 MG: 80 TABLET, FILM COATED ORAL at 20:43

## 2024-06-27 RX ADMIN — ASPIRIN 81 MG: 81 TABLET, COATED ORAL at 17:06

## 2024-06-27 RX ADMIN — SODIUM BICARBONATE 150 MEQ: 84 INJECTION, SOLUTION INTRAVENOUS at 03:31

## 2024-06-27 RX ADMIN — OXYCODONE 5 MG: 5 TABLET ORAL at 20:44

## 2024-06-27 RX ADMIN — OXYCODONE 5 MG: 5 TABLET ORAL at 17:09

## 2024-06-27 RX ADMIN — MOMETASONE FUROATE AND FORMOTEROL FUMARATE DIHYDRATE 1 PUFF: 200; 5 AEROSOL RESPIRATORY (INHALATION) at 17:11

## 2024-06-27 RX ADMIN — OMEPRAZOLE 20 MG: 20 CAPSULE, DELAYED RELEASE ORAL at 05:07

## 2024-06-27 RX ADMIN — SENNOSIDES AND DOCUSATE SODIUM 2 TABLET: 50; 8.6 TABLET ORAL at 17:05

## 2024-06-27 RX ADMIN — SODIUM ZIRCONIUM CYCLOSILICATE 10 G: 10 POWDER, FOR SUSPENSION ORAL at 10:12

## 2024-06-27 RX ADMIN — SODIUM ZIRCONIUM CYCLOSILICATE 10 G: 10 POWDER, FOR SUSPENSION ORAL at 20:38

## 2024-06-27 RX ADMIN — ALLOPURINOL 100 MG: 100 TABLET ORAL at 05:07

## 2024-06-27 RX ADMIN — SODIUM ZIRCONIUM CYCLOSILICATE 10 G: 10 POWDER, FOR SUSPENSION ORAL at 16:28

## 2024-06-27 RX ADMIN — MOMETASONE FUROATE AND FORMOTEROL FUMARATE DIHYDRATE 1 PUFF: 200; 5 AEROSOL RESPIRATORY (INHALATION) at 05:07

## 2024-06-27 RX ADMIN — HYDROCORTISONE SODIUM SUCCINATE 50 MG: 100 INJECTION, POWDER, FOR SOLUTION INTRAMUSCULAR; INTRAVENOUS at 05:08

## 2024-06-27 ASSESSMENT — PAIN DESCRIPTION - PAIN TYPE
TYPE: ACUTE PAIN

## 2024-06-27 ASSESSMENT — ENCOUNTER SYMPTOMS
SHORTNESS OF BREATH: 1
FEVER: 0

## 2024-06-27 ASSESSMENT — FIBROSIS 4 INDEX: FIB4 SCORE: 3.79

## 2024-06-27 NOTE — PROGRESS NOTES
This RN attempted to place pillow under pt feet to offload from edge of bed due to no bed extenders in stock. Patient refusing offloading pillow.

## 2024-06-27 NOTE — CARE PLAN
The patient is Watcher - Medium risk of patient condition declining or worsening    Shift Goals  Clinical Goals: hemodynamic stability, wean supplemental oxygen,  Patient Goals: sleep  Family Goals: updates    Progress made toward(s) clinical / shift goals:    Problem: Knowledge Deficit - Standard  Goal: Patient and family/care givers will demonstrate understanding of plan of care, disease process/condition, diagnostic tests and medications  Outcome: Progressing  Note: Pt educated regarding plan of care and medications. All questions answered.        Problem: Hemodynamics  Goal: Patient's hemodynamics, fluid balance and neurologic status will be stable or improve  Outcome: Progressing     Problem: Fall Risk  Goal: Patient will remain free from falls  Outcome: Progressing   Problem: pain,   Goal: Patient verbalizes non pharmacological and pharmacological interventions.    Patient is not progressing towards the following goals:

## 2024-06-27 NOTE — CARE PLAN
The patient is Watcher - Medium risk of patient condition declining or worsening    Shift Goals  Clinical Goals: hemodynamic stability, wean and monitor O2  Patient Goals: rest  Family Goals: updates    Progress made toward(s) clinical / shift goals:       Problem: Knowledge Deficit - Standard  Goal: Patient and family/care givers will demonstrate understanding of plan of care, disease process/condition, diagnostic tests and medications  Outcome: Progressing     Problem: Psychosocial  Goal: Patient's level of anxiety will decrease  Outcome: Progressing     Problem: Hemodynamics  Goal: Patient's hemodynamics, fluid balance and neurologic status will be stable or improve  Outcome: Progressing     Problem: Wound/ / Incision Healing  Goal: Patient's wound/surgical incision will decrease in size and heals properly  Outcome: Progressing     Problem: Pain - Standard  Goal: Alleviation of pain or a reduction in pain to the patient’s comfort goal  Outcome: Progressing     Problem: Skin Integrity  Goal: Skin integrity is maintained or improved  Outcome: Progressing     Problem: Fall Risk  Goal: Patient will remain free from falls  Outcome: Progressing

## 2024-06-27 NOTE — PROGRESS NOTES
4 Eyes Skin Assessment Completed by LAN Edwards and LAN Velasquez.    Head WDL  Ears WDL  Nose Redness and Blanching oxymask loosened  Mouth WDL  Neck Redness and Bruising petechiae  Breast/Chest Redness, Bruising, Scab, and Scar small blisters on axilla  Shoulder Blades Redness and Blanching  Spine Redness and Blanching  (R) Arm/Elbow/Hand Redness, Bruising, and Edema  (L) Arm/Elbow/Hand Redness, Bruising, and Edema Axillary wound, redressed per wound instructions      Abdomen Bruising  Groin WDL  Scrotum/Coccyx/Buttocks Redness, Non-Blanching, and Excoriation pressure injury, barrier paste applied    (R) Leg Redness, Blanching, Swelling, and Edema  (L) Leg Redness, Blanching, Swelling, and Edema  (R) Heel/Foot/Toe Redness, Blanching, and Discoloration, lines from previous compression stockings    (L) Heel/Foot/Toe Redness, Blanching, Discoloration, and Boggy            Devices In Places ECG, Blood Pressure Cuff, Pulse Ox, and SCD's      Interventions In Place Heel Mepilex, TAP System, Pillows, Low Air Loss Mattress, and Barrier Cream    Possible Skin Injury Yes    Pictures Uploaded Into Epic Yes  Wound Consult Placed Yes  RN Wound Prevention Protocol Ordered Yes

## 2024-06-27 NOTE — PROGRESS NOTES
Hospital Medicine Daily Progress Note    Date of Service  6/27/2024    Chief Complaint  Jama Altman is a 68 y.o. male admitted 6/25/2024 with shortness of breath and worsening kidney function    Hospital Course  Agapito is a 68 y.o. 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  He was sent for admission by Dr. León/nephrology.  Blood work showed WBC 12.1, hemoglobin 11.5, 9, potassium 6.5, glucose 231, creatinine 3.7, .  His weight is 248 pounds.  Chest x-ray: Moderate sized bilateral pleural effusions, bibasilar opacities, moderate cardiomegaly  He was started on IV bicarb and will be admitted to IMCU  He has blood pressure 83/60.  He states that he always has low blood pressure and is on midodrine at home 10 mg 3 times daily.  He is on 4 L nasal cannula  Currently not in respiratory distress  Patient aware that possibly he will need to be started on dialysis.  He states that lately he has to push hard to urinate     Treatment in ER included dextrose/insulin, IV bicarb, nebulizer of albuterol, IV Lasix 80 mg       Interval Problem Update  6/26/2024: Mr. Altman was evaluated in the IMCU.  He is on IV bicarb drip with 3 amp of bicarb at 150 mL/h.  His creatinine this morning is 3.46 with a potassium 5.9. his significant other, Ashely is at bedside.  I have reached out to Dr. Fajardo his oncologist to let him know he is here.  Thoracentesis was done today with 2 L  out the left lung his uric acid came back over 13 and he has been started on allopurinol.  6/27: Mr. Altman was seen in the IMCU.  He remains on a bicarb drip at 42 mL/h.  Creatinine today is 3.12.  Potassium remains high at 5.6.  He has been started on allopurinol and his uric acid remains high at 12. Ashely is at bedside. He is on stress-dose steroids and and current blood pressure is 105/65. He is pending a kidney biopsy. Dr. Fajardo saw him this morning. I had a long discussion with Mr. Altman and Ashely about his condition and explained the ongoing IV fluids due to need for intravascular pressure in the setting of progressive extravascular peripheral edema. We discussed that he may require dialysis in order to pull fluid. 35 minutes were spent that of which over 50% of the time was spent in counseling about his decreased renal function and peripheral edema.     I have discussed this patient's plan of care and discharge plan at IDT rounds today with Case Management, Nursing, Nursing leadership, and other members of the IDT team.    Consultants/Specialty  nephrology I spoke with Dr. Hudson in person  Oncology    Code Status  Full Code    Disposition  The patient is not medically cleared for discharge to home or a post-acute facility.    I have placed the appropriate orders for post-discharge needs.    Review of Systems  Review of Systems   Constitutional:  Positive for malaise/fatigue. Negative for fever.   Respiratory:  Positive for shortness of breath.    Cardiovascular:  Positive for chest pain and leg swelling.   All other systems reviewed and are negative.       Physical Exam  Temp:  [35.8 °C (96.5 °F)-36.2 °C (97.2 °F)] 35.8 °C (96.5 °F)  Pulse:  [53-98] 69  Resp:  [11-39] 17  BP: ()/(49-64) 89/61  SpO2:  [87 %-98 %] 91 %    Physical Exam  Vitals and nursing note reviewed.   Constitutional:       General: He is not in acute distress.     Appearance: He is ill-appearing. He is not toxic-appearing.   HENT:       Mouth/Throat:      Mouth: Mucous membranes are dry.   Cardiovascular:      Rate and Rhythm: Normal rate. Rhythm irregular.   Pulmonary:      Comments: Few crackles  Decreased air movement right base  6 liters of oxygen  Abdominal:      General: There is distension.      Tenderness: There is no abdominal tenderness.   Musculoskeletal:      Right lower leg: Edema present.      Left lower leg: Edema present.      Comments: Left arm swollen> right  Left axilla open wound is packed   Neurological:      General: No focal deficit present.      Mental Status: He is alert and oriented to person, place, and time.       Fluids    Intake/Output Summary (Last 24 hours) at 6/27/2024 0753  Last data filed at 6/27/2024 0740  Gross per 24 hour   Intake 1403.07 ml   Output 500 ml   Net 903.07 ml       Laboratory  Recent Labs     06/25/24 1903 06/26/24 0310 06/27/24  0324   WBC 13.0* 11.1* 8.5   RBC 4.10* 3.83* 3.64*   HEMOGLOBIN 10.5* 9.8* 9.2*   HEMATOCRIT 34.4* 31.9* 30.3*   MCV 83.9 83.3 83.2   MCH 25.6* 25.6* 25.3*   MCHC 30.5* 30.7* 30.4*   RDW 50.8* 51.0* 50.8*   PLATELETCT 96* 72* 79*   MPV  --   --  12.4     Recent Labs     06/25/24 1903 06/26/24 0310 06/27/24  0324   SODIUM 130* 131* 131*   POTASSIUM 6.5* 5.9* 5.6*   CHLORIDE 94* 92* 92*   CO2 21 26 30   GLUCOSE 362* 286* 291*   * 109* 111*   CREATININE 3.68* 3.46* 3.21*   CALCIUM 9.1 8.9 8.4*                   Imaging  DX-CHEST-PORTABLE (1 VIEW)   Final Result      1.  Persistent hypoinflation with small bilateral pleural effusions and bibasilar consolidation.   2.  No significant change from prior exam.   3.  No pneumothorax.      US-THORACENTESIS PUNCTURE LEFT   Final Result      1. Ultrasound guided left sided diagnostic and therapeutic thoracentesis.      2. 2050 mL of fluid withdrawn.      US-RENAL TRANSPLANT COMP   Final Result         1.  Normal resistive indices and acceleration time      DX-CHEST-PORTABLE (1 VIEW)   Final Result      1.  Moderate-sized  bilateral pleural effusions      2.  Bibasilar airspace opacities consistent with atelectasis, pneumonitis, and/or pleural effusion..      3.  Moderate cardiomegaly.      CT-NEEDLE CORE BX-RENAL    (Results Pending)        Assessment/Plan  * SANKET (acute kidney injury) (HCC) on CKD stage III- (present on admission)  Assessment & Plan  Unclear etiology  IV bicarb drip  Nephrology consulted  Follow urine output and basic metabolic panel ordered for the morning  Kidney biopsy ordered for tomorrow  Renal ultrasound did not reveal abnormalities    Hyperkalemia- (present on admission)  Assessment & Plan  Potassium 6.5, likely secondary to progression of renal failure  Treated in ER with albuterol nebulizer, insulin/dextrose, IV bicarb  Continue bicarb, ordered Lokelma, renal diet  Continuous telemetry monitoring to evaluate for arrhythmias  Dialysis consideration as per nephrology after kidney biospy      Pleural effusion- (present on admission)  Assessment & Plan  Bilateral moderate pleural effusions noted.  Likely secondary to volume overload,  2 liters off the left side on 6/26.     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 Fajardo was 3 weeks ago.  Dr. Fajardo has consulted    Long term (current) use of systemic steroids- (present on  admission)  Assessment & Plan  Immunocompromised.  On prednisone 20 mg once a day  Consider Bactrim for PCP prophylaxis    Abdominal aortic aneurysm (AAA) without rupture ([facility])- (present on admission)  Assessment & Plan  History of    Secondary adrenal insufficiency (HCC)- (present on admission)  Assessment & Plan  Secondary to chronic steroid use  IV hydrocortisone was given and now transition back to prednisone 20 mg now that kidney biopsy has been done and blood pressure has gone up    Acute respiratory failure with hypoxia ([facility])- (present on admission)  Assessment & Plan  Patient states he has been on oxygen since Friday  Chest x-ray showed bilateral moderate pleural effusion, bilateral airspace disease  , Discharged on 6/21 from this hospital after treatment for pneumonia with 14 days of levofloxacin and for volume overload with IV Lasix  Likely will need continue p.o. Lasix, dosage per nephrology  Consider thoracentesis if shortness of breath did not improve  Consideration for dialysis  Currently on 4 L nasal cannula  Consider repeat CT of the chest as well as pulmonology consult to evaluate for  opportunistic infection such as PCP pneumonia due to immunocompromise state, beta glucan screen    A-fib ([facility]) s/p Watchman- (present on admission)  Assessment & Plan  Noted.  Not on anticoagulation    Type 2 diabetes mellitus (HCC)- (present on admission)  Assessment & Plan  Uncontrolled with hyperglycemia 362  Continue Lantus 20 units every evening, insulin sliding scale change to high dose  Decrease steroids down to 20 mg of prednisone which is his recent baseline  Diabetic diet    Primary hypertension- (present on admission)  Assessment & Plan  Will hold Toprol due to hypotension    History of renal transplant- (present on admission)  Assessment & Plan  Likely failing transplant  Continue sirolimus, prednisone  Transplant kidney biopsy ordered for 6/27  Nephrology consulted         VTE prophylaxis:    SCDs/TEDs      I have performed a physical exam and reviewed and updated ROS and Plan today (6/27/2024). In review of yesterday's note (6/26/2024), there are no changes except as documented above.

## 2024-06-27 NOTE — PROGRESS NOTES
Pt presents to CT4.   Pt was consented by MD at bedside, confirmed by this RN.   Pt transferred to CT table in supine position.   Upon initial review there was no place to obtain sample and procedure aborted and pt taken off table to return to .  After secondary review MD saw a small area in which to obtain a sample  Pt placed on monitor, prepped and draped in a sterile fashion.  Patient underwent a renal biopsy by Dr. Gutierrez.   Procedure site was marked by MD and verified using imaging guidance.    Vitals were taken every 5 minutes and remained stable during procedure (see doc flow sheet for results) remained WNL throughout procedure.   No sedation was given, local only  Report called to Dominique MONTENEGRO. Pt transported in bed, on monitor on O2 with RN to T633.     Specimen: Renal biopsy, cores x3 verified and given to pathology.

## 2024-06-27 NOTE — PROGRESS NOTES
Pt back on the floor from IR. Pt connected to monitor.  Pt is A&OX4. Denies any pain currently. On 6L oxymask. Biopsy site with gauze and transparent dressing in place. Site looks soft. Dressing CDI with small old blood.

## 2024-06-27 NOTE — OR SURGEON
Immediate Post- Operative Note        Findings: Renal transplant dysfunction.        Procedure(s): CT guided renal transplant biopsy.       Estimated Blood Loss: NA        Complications: <5cc            6/27/2024     1521 PM     Live Gutierrez M.D.

## 2024-06-28 ENCOUNTER — APPOINTMENT (OUTPATIENT)
Dept: MEDICAL GROUP | Facility: PHYSICIAN GROUP | Age: 69
End: 2024-06-28
Payer: MEDICARE

## 2024-06-28 LAB
ANION GAP SERPL CALC-SCNC: 12 MMOL/L (ref 7–16)
BASOPHILS # BLD AUTO: 0.1 % (ref 0–1.8)
BASOPHILS # BLD: 0.01 K/UL (ref 0–0.12)
BUN SERPL-MCNC: 108 MG/DL (ref 8–22)
CALCIUM SERPL-MCNC: 8 MG/DL (ref 8.5–10.5)
CHLORIDE SERPL-SCNC: 86 MMOL/L (ref 96–112)
CO2 SERPL-SCNC: 34 MMOL/L (ref 20–33)
CREAT SERPL-MCNC: 2.9 MG/DL (ref 0.5–1.4)
EOSINOPHIL # BLD AUTO: 0.01 K/UL (ref 0–0.51)
EOSINOPHIL NFR BLD: 0.1 % (ref 0–6.9)
ERYTHROCYTE [DISTWIDTH] IN BLOOD BY AUTOMATED COUNT: 50.6 FL (ref 35.9–50)
GFR SERPLBLD CREATININE-BSD FMLA CKD-EPI: 23 ML/MIN/1.73 M 2
GLUCOSE BLD STRIP.AUTO-MCNC: 152 MG/DL (ref 65–99)
GLUCOSE BLD STRIP.AUTO-MCNC: 154 MG/DL (ref 65–99)
GLUCOSE BLD STRIP.AUTO-MCNC: 244 MG/DL (ref 65–99)
GLUCOSE SERPL-MCNC: 156 MG/DL (ref 65–99)
HCT VFR BLD AUTO: 30.2 % (ref 42–52)
HGB BLD-MCNC: 9.4 G/DL (ref 14–18)
IMM GRANULOCYTES # BLD AUTO: 0.15 K/UL (ref 0–0.11)
IMM GRANULOCYTES NFR BLD AUTO: 1.5 % (ref 0–0.9)
LYMPHOCYTES # BLD AUTO: 0.2 K/UL (ref 1–4.8)
LYMPHOCYTES NFR BLD: 2 % (ref 22–41)
MCH RBC QN AUTO: 25.9 PG (ref 27–33)
MCHC RBC AUTO-ENTMCNC: 31.1 G/DL (ref 32.3–36.5)
MCV RBC AUTO: 83.2 FL (ref 81.4–97.8)
MONOCYTES # BLD AUTO: 1.03 K/UL (ref 0–0.85)
MONOCYTES NFR BLD AUTO: 10.5 % (ref 0–13.4)
NEUTROPHILS # BLD AUTO: 8.43 K/UL (ref 1.82–7.42)
NEUTROPHILS NFR BLD: 85.8 % (ref 44–72)
NRBC # BLD AUTO: 0.03 K/UL
NRBC BLD-RTO: 0.3 /100 WBC (ref 0–0.2)
PLATELET # BLD AUTO: 76 K/UL (ref 164–446)
PLATELETS.RETICULATED NFR BLD AUTO: 12.8 % (ref 0.6–13.1)
POTASSIUM SERPL-SCNC: 4.9 MMOL/L (ref 3.6–5.5)
RBC # BLD AUTO: 3.63 M/UL (ref 4.7–6.1)
SODIUM SERPL-SCNC: 132 MMOL/L (ref 135–145)
WBC # BLD AUTO: 9.8 K/UL (ref 4.8–10.8)

## 2024-06-28 PROCEDURE — A9270 NON-COVERED ITEM OR SERVICE: HCPCS | Performed by: HOSPITALIST

## 2024-06-28 PROCEDURE — 85055 RETICULATED PLATELET ASSAY: CPT

## 2024-06-28 PROCEDURE — 700111 HCHG RX REV CODE 636 W/ 250 OVERRIDE (IP): Performed by: INTERNAL MEDICINE

## 2024-06-28 PROCEDURE — 99233 SBSQ HOSP IP/OBS HIGH 50: CPT | Performed by: HOSPITALIST

## 2024-06-28 PROCEDURE — 700111 HCHG RX REV CODE 636 W/ 250 OVERRIDE (IP): Performed by: HOSPITALIST

## 2024-06-28 PROCEDURE — 700101 HCHG RX REV CODE 250: Performed by: INTERNAL MEDICINE

## 2024-06-28 PROCEDURE — 700102 HCHG RX REV CODE 250 W/ 637 OVERRIDE(OP): Performed by: INTERNAL MEDICINE

## 2024-06-28 PROCEDURE — A9270 NON-COVERED ITEM OR SERVICE: HCPCS | Performed by: INTERNAL MEDICINE

## 2024-06-28 PROCEDURE — 80048 BASIC METABOLIC PNL TOTAL CA: CPT

## 2024-06-28 PROCEDURE — 770000 HCHG ROOM/CARE - INTERMEDIATE ICU *

## 2024-06-28 PROCEDURE — 700102 HCHG RX REV CODE 250 W/ 637 OVERRIDE(OP): Performed by: HOSPITALIST

## 2024-06-28 PROCEDURE — 82962 GLUCOSE BLOOD TEST: CPT | Mod: 91

## 2024-06-28 PROCEDURE — 85025 COMPLETE CBC W/AUTO DIFF WBC: CPT

## 2024-06-28 RX ORDER — MIDODRINE HYDROCHLORIDE 5 MG/1
10 TABLET ORAL EVERY 8 HOURS
Status: DISCONTINUED | OUTPATIENT
Start: 2024-06-28 | End: 2024-07-01

## 2024-06-28 RX ORDER — PREDNISONE 20 MG/1
10 TABLET ORAL DAILY
Status: DISCONTINUED | OUTPATIENT
Start: 2024-06-29 | End: 2024-07-01

## 2024-06-28 RX ADMIN — HYDROMORPHONE HYDROCHLORIDE 0.25 MG: 1 INJECTION, SOLUTION INTRAMUSCULAR; INTRAVENOUS; SUBCUTANEOUS at 16:53

## 2024-06-28 RX ADMIN — SIROLIMUS 4 MG: 0.5 TABLET ORAL at 05:14

## 2024-06-28 RX ADMIN — ATORVASTATIN CALCIUM 80 MG: 80 TABLET, FILM COATED ORAL at 21:16

## 2024-06-28 RX ADMIN — GABAPENTIN 200 MG: 100 CAPSULE ORAL at 05:15

## 2024-06-28 RX ADMIN — OMEPRAZOLE 20 MG: 20 CAPSULE, DELAYED RELEASE ORAL at 05:15

## 2024-06-28 RX ADMIN — OXYCODONE 5 MG: 5 TABLET ORAL at 14:56

## 2024-06-28 RX ADMIN — SODIUM BICARBONATE 150 MEQ: 84 INJECTION, SOLUTION INTRAVENOUS at 07:02

## 2024-06-28 RX ADMIN — SENNOSIDES AND DOCUSATE SODIUM 2 TABLET: 50; 8.6 TABLET ORAL at 17:13

## 2024-06-28 RX ADMIN — OXYCODONE 2.5 MG: 5 TABLET ORAL at 08:30

## 2024-06-28 RX ADMIN — MIDODRINE HYDROCHLORIDE 10 MG: 5 TABLET ORAL at 08:16

## 2024-06-28 RX ADMIN — MIDODRINE HYDROCHLORIDE 10 MG: 5 TABLET ORAL at 13:12

## 2024-06-28 RX ADMIN — INSULIN HUMAN 4 UNITS: 100 INJECTION, SOLUTION PARENTERAL at 20:03

## 2024-06-28 RX ADMIN — GABAPENTIN 200 MG: 100 CAPSULE ORAL at 17:12

## 2024-06-28 RX ADMIN — SODIUM ZIRCONIUM CYCLOSILICATE 10 G: 10 POWDER, FOR SUSPENSION ORAL at 09:35

## 2024-06-28 RX ADMIN — MOMETASONE FUROATE AND FORMOTEROL FUMARATE DIHYDRATE 1 PUFF: 200; 5 AEROSOL RESPIRATORY (INHALATION) at 17:12

## 2024-06-28 RX ADMIN — MOMETASONE FUROATE AND FORMOTEROL FUMARATE DIHYDRATE 1 PUFF: 200; 5 AEROSOL RESPIRATORY (INHALATION) at 05:15

## 2024-06-28 RX ADMIN — MIDODRINE HYDROCHLORIDE 10 MG: 5 TABLET ORAL at 21:16

## 2024-06-28 RX ADMIN — INSULIN GLARGINE-YFGN 20 UNITS: 100 INJECTION, SOLUTION SUBCUTANEOUS at 20:03

## 2024-06-28 RX ADMIN — ALLOPURINOL 100 MG: 100 TABLET ORAL at 05:15

## 2024-06-28 RX ADMIN — PREDNISONE 20 MG: 20 TABLET ORAL at 05:15

## 2024-06-28 RX ADMIN — ASPIRIN 81 MG: 81 TABLET, COATED ORAL at 17:13

## 2024-06-28 ASSESSMENT — ENCOUNTER SYMPTOMS
ROS GI COMMENTS: POOR APPETITE
FEVER: 0
SHORTNESS OF BREATH: 1

## 2024-06-28 ASSESSMENT — PAIN DESCRIPTION - PAIN TYPE
TYPE: ACUTE PAIN

## 2024-06-28 NOTE — CARE PLAN
The patient is Watcher - Medium risk of patient condition declining or worsening    Shift Goals  Clinical Goals: hemodynamic stability, wean and monitor O2  Patient Goals: rest  Family Goals: updates    Progress made toward(s) clinical / shift goals:    Problem: Hemodynamics  Goal: Patient's hemodynamics, fluid balance and neurologic status will be stable or improve  Outcome: Progressing     Problem: Wound/ / Incision Healing  Goal: Patient's wound/surgical incision will decrease in size and heals properly  Outcome: Progressing  Note: Pt educated on wound care nurse instructions, pressure injury management, q2 hour turn interventions, Pt refused some interventions,      Problem: Pain - Standard  Goal: Alleviation of pain or a reduction in pain to the patient’s comfort goal  Outcome: Progressing  Note: Patient educated on non-pharmacological and pharmacological pain interventions. Patient verbalized understanding.          Patient is not progressing towards the following goals:

## 2024-06-28 NOTE — PROGRESS NOTES
Pt stood up to void in urinal and HR got into 150s-160s for 5 min while standing. Pt had no complaints of chest pain, SOB, or lightheadedness. When pt returned to laying in bed, pt complained of lightheadedness and SOB. After two minutes of resting, HR returned to 80s a fib, and lightheadedness and SOB resolved. MD notified.

## 2024-06-28 NOTE — CONSULTS
DATE OF SERVICE:  06/28/2024     REASON FOR CONSULTATION:  1.  Metastatic squamous cell cancer.  2.  Renal failure.     HISTORY OF PRESENT ILLNESS:  The patient is a very pleasant 68-year-old   gentleman who was diagnosed with metastatic squamous cell carcinoma skin with   bony metastasis.  The patient was subsequently started on cemiplimab and has   received 3 cycles last one, which was given him on 06/06/2024.  His renal   function worsened and he was admitted to the hospital with shortness of breath   and recurrent pleural effusions.  He has undergone dialysis in the past.    Heme/Onc consultation was called for further management of his condition.  I   discussed the case today with Dr. Faulkner and Dr. Gutiérrez.     PAST MEDICAL HISTORY:  Squamous cell carcinoma of the skin, status post renal   transplant, AFib, osteoarthritis, history of polycystic kidney disease.     PAST SURGICAL HISTORY:  Watchman placement, renal transplant left kidney, knee   surgery left.     PERSONAL AND SOCIAL HISTORY:  Lives with his significant other, nonsmoker.     FAMILY HISTORY:  Not pertinent to this hospitalization.     REVIEW OF SYSTEMS:  GENERAL AND CONSTITUTIONAL:  Fatigue, weakness and shortness of breath.  No   fever.  HEAD AND NECK, EAR, NOSE AND THROAT:  Denies any headaches or any visual   symptoms.  RESPIRATORY:  Short of breath.  Minimal cough, no hemoptysis.  CARDIOVASCULAR:  No chest pain or palpitations.  Has shortness of breath.  GASTROINTESTINAL:  No nausea or vomiting.  Loss of appetite.  MUSCULOSKELETAL:  Has back and joint pains.  No new change.  NEUROLOGICAL:  Denies any seizures or any stroke.  PSYCHIATRIC:  Very anxious.     PHYSICAL EXAMINATION:  GENERAL:  The patient is alert and oriented x3, minimal respiratory distress.  VITAL SIGNS:  Pulse 79, BP 95/54, respiratory rate 22.  HEENT:  Pupils are equal.  Oral mucosa reveals no thrush.  There is no   mucositis.  NECK:  Supple.  LUNGS:  Showed decreased air  entry at both bases.  No wheezing, no rales.  HEART:  Reveals no S3, S4.  ABDOMEN:  Reveals no hepatosplenomegaly.  EXTREMITIES:  There is +2 bilateral lower extremity edema.  SKIN:  Reveals no rash, but shows some bruising.  NEUROLOGICAL:  Able to move all four extremities.  Speech is normal.     RADIOLOGICAL STUDIES:  Reviewed.     LABORATORY DATA:  Reviewed.     ASSESSMENT AND PLAN:  Renal failure.  The patient has renal failure.  The   likely possibility is use of immunotherapy.  Another possibility is rejection   of his transplanted kidney.  Since he had to change his antirejection   medications recently.  Kidney biopsy has been done.  We will follow up those   results.  Squamous cell carcinoma of the skin.  It is not clear whether the   patient is responding to his current treatment or not.  We will get a CT scan   done on him.  If the patient's kidney failure is thought to be related.  It is   not clear whether the renal failure is caused by his immunotherapy, if so we   will not be able to continue immunotherapy.  Hence, the only treatment   available for this patient would be palliative radiation, which may help with   his symptoms, but is not curative.  The patient and his significant other are   aware of this issue.  His and his significant other and his sister's questions   were answered to their satisfaction.  Overall, his prognosis is very poor.    We also discussed about hospice.  I will continue to follow him up.        ______________________________  MD TAMMY Ogden/YELENA    DD:  06/28/2024 13:19  DT:  06/28/2024 14:08    Job#:  [id number]

## 2024-06-28 NOTE — PROGRESS NOTES
4 Eyes Skin Assessment Completed by LAN Edwards and LAN Miguel.    Head WDL  Ears Redness and Blanching right ear skin tear scabbed  Nose Redness and Blanching  Mouth WDL  Neck WDL  Breast/Chest Bruising  Shoulder Blades WDL  Spine Bruising BL mid back edema  (R) Arm/Elbow/Hand Redness, Blanching, Bruising, Weeping, and Edema  (L) Arm/Elbow/Hand Redness, Blanching, Bruising, and Swelling  Left axilla wound, dressing changed, pt refused site care, allowed periwound site cleansing  Abdomen Bruising, RLQ gauze tegarderm from renal biopsy site  Groin WDL  Scrotum/Coccyx/Buttocks Redness and Blanching open pressure wound, yellow,   (R) Leg Swelling discoloration  (L) Leg Redness, Blanching, and Swelling posterior knee bruise  (R) Heel/Foot/Toe Redness, Blanching, and Swelling bruising  (L) Heel/Foot/Toe Redness, Blanching, and Swelling bruising          Devices In Places ECG, Blood Pressure Cuff, Pulse Ox, and Oxy Mask      Interventions In Place Gray Ear Foams, Heel Mepilex, TAP System, Pillows, Q2 Turns, Low Air Loss Mattress, Barrier Cream, Dri-Kaleb Pads, and Heels Loaded W/Pillows  Pt education on pressure injury interventions, refused q2hr turns/repositioning, sacral mepilex, heel float boots, allowed RN to elevate heels with pillow  Possible Skin Injury Yes    Pictures Uploaded Into Epic Yes  Wound Consult Placed N/A   RN Wound Prevention Protocol Ordered Yes

## 2024-06-28 NOTE — PROGRESS NOTES
Patient refusing offloading of heels with pillows or heel float boots. Heel mepilexes in place. Patient agreeable to being floated with pillows on bilat sides to offload bottom. Pt also agreed to have barrier cream applied, but refused sacral mepilex. Education provided. Charge RN and Nurse Manager aware.

## 2024-06-28 NOTE — PROGRESS NOTES
Hospital Medicine Daily Progress Note    Date of Service  6/28/2024    Chief Complaint  Jama Altman is a 68 y.o. male admitted 6/25/2024 with shortness of breath and worsening kidney function    Hospital Course  Agapito is a 68 y.o. 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  He was sent for admission by Dr. León/nephrology.  Blood work showed WBC 12.1, hemoglobin 11.5, 9, potassium 6.5, glucose 231, creatinine 3.7, .  His weight is 248 pounds.  Chest x-ray: Moderate sized bilateral pleural effusions, bibasilar opacities, moderate cardiomegaly  He was started on IV bicarb and will be admitted to IMCU  He has blood pressure 83/60.  He states that he always has low blood pressure and is on midodrine at home 10 mg 3 times daily.  He is on 4 L nasal cannula  Currently not in respiratory distress  Patient aware that possibly he will need to be started on dialysis.  He states that lately he has to push hard to urinate     Treatment in ER included dextrose/insulin, IV bicarb, nebulizer of albuterol, IV Lasix 80 mg       Interval Problem Update  6/26/2024: Mr. Altman was evaluated in the IMCU.  He is on IV bicarb drip with 3 amp of bicarb at 150 mL/h.  His creatinine this morning is 3.46 with a potassium 5.9. his significant other, Ashely is at bedside.  I have reached out to Dr. Fajardo his oncologist to let him know he is here.  Thoracentesis was done today with 2 L  out the left lung his uric acid came back over 13 and he has been started on allopurinol.  6/27: Mr. Altman was seen in the IMCU.  He remains on a bicarb drip at 42 mL/h.  Creatinine today is 3.12.  Potassium remains high at 5.6.  He has been started on allopurinol and his uric acid remains high at 12. Ashely is at bedside. He is on stress-dose steroids and and current blood pressure is 105/65. He is pending a kidney biopsy. Dr. Fajardo saw him this morning. I had a long discussion with Mr. Altman and Ashely about his condition and explained the ongoing IV fluids due to need for intravascular pressure in the setting of progressive extravascular peripheral edema. We discussed that he may require dialysis in order to pull fluid.   6/28: Mr. Altman was evaluated in the [facility]. He has been on a bicarb drip at 42 mL/hour. His Cr is 2.9 today.  300 mL urine over night and 400 mL this morning. I had a long discussion with Mr. Altman, his significant other, Ashely, his daughter Mena, and granddaughter Rayne. We discussed that he has a terminal disease process with respect to the squamous cell and he is on palliative immunotherapy which may be contributing to the worsening renal function. We discussed that he may need to go onto dialysis in order to tolerate the immunotherapy. We await the kidney biopsy. 35 minutes were spent that of which over 50% of the time was spent at bedside in counseling about his diagnosis and possible need for dialysis.     I have discussed this patient's plan of care and discharge plan at IDT rounds today with Case Management, Nursing, Nursing leadership, and other members of the IDT team.    Consultants/Specialty  nephrology I spoke with Dr. Gutiérrez in person  Oncology I discussed with Dr. Fajardo in person    Code Status  Full Code    Disposition  The patient is not medically cleared for discharge to home or a post-acute facility.      I have placed the appropriate orders for post-discharge needs.    Review of  Systems  Review of Systems   Constitutional:  Positive for malaise/fatigue. Negative for fever.   Respiratory:  Positive for shortness of breath.    Cardiovascular:  Positive for chest pain and leg swelling.   Gastrointestinal:         Poor appetite   All other systems reviewed and are negative.       Physical Exam  Temp:  [35.8 °C (96.5 °F)-36.2 °C (97.2 °F)] 35.8 °C (96.5 °F)  Pulse:  [68-99] 79  Resp:  [12-29] 22  BP: ()/(54-88) 95/54  SpO2:  [89 %-100 %] 95 %    Physical Exam  Vitals and nursing note reviewed.   Constitutional:       General: He is not in acute distress.     Appearance: He is ill-appearing. He is not toxic-appearing.   HENT:      Mouth/Throat:      Mouth: Mucous membranes are dry.   Cardiovascular:      Rate and Rhythm: Normal rate. Rhythm irregular.      Comments: Left back has an edematous region  Pulmonary:      Comments: Few crackles  Decreased air movement right base  6 liters of oxygen  Abdominal:      General: There is distension.      Tenderness: There is no abdominal tenderness.   Musculoskeletal:      Right lower leg: Edema present.      Left lower leg: Edema present.      Comments: Left arm swollen> right  Left axilla open wound is packed   Skin:     General: Skin is dry.      Coloration: Skin is pale.   Neurological:      General: No focal deficit present.      Mental Status: He is alert and oriented to person, place, and time.   Psychiatric:         Mood and Affect: Mood normal.         Behavior: Behavior normal.         Fluids    Intake/Output Summary (Last 24 hours) at 6/28/2024 0717  Last data filed at 6/27/2024 2307  Gross per 24 hour   Intake 273.35 ml   Output 1000 ml   Net -726.65 ml       Laboratory  Recent Labs     06/26/24  0310 06/27/24  0324 06/28/24  0335   WBC 11.1* 8.5 9.8   RBC 3.83* 3.64* 3.63*   HEMOGLOBIN 9.8* 9.2* 9.4*   HEMATOCRIT 31.9* 30.3* 30.2*   MCV 83.3 83.2 83.2   MCH 25.6* 25.3* 25.9*   MCHC 30.7* 30.4* 31.1*   RDW 51.0* 50.8* 50.6*   PLATELETCT  72* 79* 76*   MPV  --  12.4  --      Recent Labs     06/26/24  0310 06/27/24  0324 06/28/24  0335   SODIUM 131* 131* 132*   POTASSIUM 5.9* 5.6* 4.9   CHLORIDE 92* 92* 86*   CO2 26 30 34*   GLUCOSE 286* 291* 156*   * 111* 108*   CREATININE 3.46* 3.21* 2.90*   CALCIUM 8.9 8.4* 8.0*                   Imaging  CT-NEEDLE CORE BX-RENAL   Final Result      CT-guided core biopsy of the right lower quadrant transplant kidney.      DX-CHEST-PORTABLE (1 VIEW)   Final Result      1.  Persistent hypoinflation with small bilateral pleural effusions and bibasilar consolidation.   2.  No significant change from prior exam.   3.  No pneumothorax.      US-THORACENTESIS PUNCTURE LEFT   Final Result      1. Ultrasound guided left sided diagnostic and therapeutic thoracentesis.      2. 2050 mL of fluid withdrawn.      US-RENAL TRANSPLANT COMP   Final Result         1.  Normal resistive indices and acceleration time      DX-CHEST-PORTABLE (1 VIEW)   Final Result      1.  Moderate-sized bilateral pleural effusions      2.  Bibasilar airspace opacities consistent with atelectasis, pneumonitis, and/or pleural effusion..      3.  Moderate cardiomegaly.           Assessment/Plan  * SANKET (acute kidney injury) (HCC) on CKD stage III- (present on admission)  Assessment & Plan  Unclear etiology  IV bicarb drip was given and will be turned off due to volume overload  Nephrology consulted  Follow urine output and basic metabolic panel ordered for the morning  Kidney biopsy 6/27  Renal ultrasound did not reveal abnormalities    Hyperkalemia- (present on admission)  Assessment & Plan  Potassium 6.5, likely secondary to progression of renal failure  Treated in ER with albuterol nebulizer, insulin/dextrose, IV bicarb  Continue bicarb, ordered Lokelma, renal diet  Continuous telemetry monitoring to evaluate for arrhythmias  Dialysis consideration as per nephrology after kidney biospy      Pleural effusion- (present on admission)  Assessment &  Plan  Bilateral moderate pleural effusions noted.  Likely secondary to volume overload,  2 liters off the left side on 6/26.   Cells are + for squamous cell carcinoma thus it is a malignant pleural effusion.    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 Fajardo was 3 weeks ago though he has not been tolerating it.   Dr. Fajardo has consulted    Long term (current) use of systemic steroids- (present on admission)  Assessment & Plan  Immunocompromised.  On prednisone 20 mg once a day  Consider Bactrim for PCP prophylaxis    Abdominal aortic aneurysm (AAA) without rupture (HCC)- (present on admission)  Assessment & Plan  History of    Secondary adrenal insufficiency (HCC)- (present on admission)  Assessment & Plan  Secondary to chronic steroid use  IV hydrocortisone was given and now transition back to prednisone 20 mg now that kidney biopsy has been done and blood pressure has gone up    Acute respiratory failure with hypoxia (HCC)- (present on admission)  Assessment & Plan  Patient states he has been on oxygen since Friday  Chest x-ray showed bilateral moderate pleural effusion, bilateral airspace disease  , Discharged on 6/21 from this hospital  after treatment for pneumonia with 14 days of levofloxacin and for volume overload with IV Lasix  Status post thoracentesis on the left side  Consideration for dialysis  Currently on 4 L nasal cannula  Chest xray on 6/28 reveals pleural effusions, pulmonary edema thus stop IV fluids    A-fib ([facility]) s/p Watchman- (present on admission)  Assessment & Plan  Noted.  Not on anticoagulation    Type 2 diabetes mellitus ([facility])- (present on admission)  Assessment & Plan  Uncontrolled with hyperglycemia 362  Continue Lantus 20 units every evening, insulin sliding scale change to high dose  Decrease steroids down to 20 mg of prednisone which is his recent baseline  Diabetic diet    Thrombocytopenia (HCC)- (present on admission)  Assessment & Plan  Platelet count 76  Follow up platelets in the morning    Primary hypertension- (present on admission)  Assessment & Plan  Will hold Toprol due to hypotension    History of renal transplant- (present on admission)  Assessment & Plan  Poor renal function  Continue sirolimus, prednisone  Transplant kidney biopsy ordered was done 6/27  Nephrology consulted         VTE prophylaxis:   SCDs/TEDs      I have performed a physical exam and reviewed and updated ROS and Plan today (6/28/2024). In review of yesterday's note (6/27/2024), there are no changes except as documented above.

## 2024-06-28 NOTE — PROGRESS NOTES
"0800: Assessed pt's skin. Pt has multiple wounds. Pt refused turns. Allowed RN to put barrier paste but pt refused Mepilex. States, \" It is plastic and I sweat, I don't want it.\" RN offered to change mepilex throughout shift if pt get sweaty, still refused. Offered pt to go up to the chair or walk in the hallway. Pt refused both. States, \"I am comfortable where I am right now\". Pt has heel mepilex on. Refused offloading using pillows or float boots. Education provided regarding pressure injury prevention. Pt also has some redness that is slow to tyra around his abdomen due to his underwear. Refused to take it off. Verbalizes understanding but declines intervention. Charge RN Ashley notified and escalated to manager.    1000: Pt allowed RN to offload his heels using pillows. Refusing turns and mepilex still.     1600: Pt still refusing turns. Reeducated multiple times throughout shift. Pt allowed RN to apply heel float boots to offload heels. Still refusing turns.     1730: Pt refusing turns but agreeable to walk around the hallway once his pain in left axilla is controlled. Pain med administered at 1709.     "

## 2024-06-28 NOTE — PROGRESS NOTES
Pt has a soft bump on bilateral back. Right side below thoracentesis site is bigger compared to left. Dr. Faulkner notified and assess pt at bedside.

## 2024-06-28 NOTE — WOUND TEAM
[facility]  Inpatient Services  Wound and Skin Care Brief Evaluation    Admission Date: 6/25/2024     Last order of IP CONSULT TO WOUND CARE was found on 6/27/2024 from Hospital Encounter on 6/25/2024     HPI, PMH, SH: Reviewed    Chief Complaint   Patient presents with    Abnormal Labs     Pt sent by nephrology due to elevated BUN and creatinine along with electrolyte abnormalities.  Pt also endorses increased swelling to lower extremities.  Pt is currently receiving immunotherapy and radiation for squamous cell carcinoma.       Diagnosis: SANKET (acute kidney injury) (HCC) [N17.9]    Unit where seen by Wound Team: [id number]     Wound consult placed regarding L posterior knee. Chart and images reviewed. This discussed with bedside RN. This clinician in to assess patient. Patient pleasant and agreeable. Left posterior knee pink and intact.     No pressure injuries or advanced wound care needs identified. Wound consult completed. No further follow up unless indicated and consulted.            PREVENTATIVE INTERVENTIONS:    Q shift Javad - performed per nursing policy  Q shift pressure point assessments - performed per nursing policy

## 2024-06-28 NOTE — PROGRESS NOTES
[facility] -PROGRESS NOTE               Author: Mehrdad Gutiérrez M.D. Date & Time: 6/28/2024  10:14 AM       HISTORY OF PRESENT ILLNESS:    Patient is a 68 year old male with a PMHx of CKD stage 4/5, S/P Living unrelated Renal transplant in 2010, PCKD, afib, CAD, HLD, HTN, and recently diagnosed squamous cell carcinoma of the left axillary region on immunotherapy via Dr. Fajardo, DM with last A1c of 6.9%, who was seen in Nepohrology clinic and sent to hospital for worsening renal failure and hyperkalemia. He complaints of worsening of shortness of breath, cough with white sputum, bilateral lower extremity edema Recent admissions to [facility] with similar presentation and was discharged just one week ago. Outpatient labs today showed a potassium of 6.0, BUN of 101, creatinine 3.7 and a glucose of 231. In the ER he was found to be hypotensive, blood pressure 83/60. Chest x-ray: Moderate sized bilateral pleural effusions, bibasilar opacities, moderate cardiomegaly. Scr from 6/5 was 2.89 and was 2.85 on discharge on 6/21/24. Nephrology was asked to consult for SANKET. He does have SOB.  No melena, hematochezia, hematemesis.  No HA, visual changes, or abdominal pain. + edema bilateral LE. His immunosupression was recently changed to Sirolimus after diagnosis of metastatic skin cancer.     NEPHROLOGY DAILY PROGRESS:   6/26:Consult note   6/27: No acute events overnight, made 1100 cc urine, creatinine slightly improved from 3.46-3.21, scheduled for kidney transplant biopsy today  6/28: Still with SOB with lelo exertion    PAST MEDICAL/SURGICAL/SOCIAL/FAMILY HISTORY:   Reviewed and remains unchanged from admission/consult note       HOME MEDICATIONS:   Reviewed and documented in chart    LABORATORY STUDIES:   Recent Labs     06/26/24  0310 06/27/24  0324 06/28/24  0335   SODIUM 131* 131* 132*   POTASSIUM 5.9* 5.6* 4.9   CHLORIDE 92* 92* 86*   CO2 26 30 34*   GLUCOSE 286* 291* 156*   * 111*  "108*   CREATININE 3.46* 3.21* 2.90*   CALCIUM 8.9 8.4* 8.0*       ALLERGIES:  Aceinhibitors [ace inhibitors], Angiotensin receptor blockers, and Doxycycline    VS:  BP 95/54   Pulse 79   Temp 35.8 °C (96.5 °F) (Temporal)   Resp (!) 22   Ht 1.981 m (6' 5.99\")   Wt 101 kg (222 lb 0.1 oz)   SpO2 95%   BMI 25.66 kg/m²     Physical Exam  Vitals and nursing note reviewed.   Constitutional:       General: He is not in acute distress.     Appearance: He is obese. He is ill-appearing.   HENT:      Head: Normocephalic and atraumatic.      Nose: Nose normal.      Mouth/Throat:      Mouth: Mucous membranes are moist.      Pharynx: Oropharynx is clear.   Eyes:      Extraocular Movements: Extraocular movements intact.      Conjunctiva/sclera: Conjunctivae normal.      Pupils: Pupils are equal, round, and reactive to light.   Cardiovascular:      Rate and Rhythm: Normal rate and regular rhythm.   Pulmonary:      Effort: No respiratory distress.      Breath sounds: Rales present.   Abdominal:      General: Abdomen is flat. Bowel sounds are normal.      Palpations: Abdomen is soft.   Musculoskeletal:      Cervical back: Normal range of motion and neck supple.      Right lower leg: Edema present.      Left lower leg: Edema present.   Skin:     Comments: Large axillary wound secondary to underlying squamous cell cancer, small necrotic area, erythematous rash around the wound, no discharge   Neurological:      General: No focal deficit present.      Mental Status: He is alert and oriented to person, place, and time. Mental status is at baseline.   Psychiatric:         Mood and Affect: Mood normal.         Behavior: Behavior normal.         Thought Content: Thought content normal.         Judgment: Judgment normal.         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 ATN vs. AIN (being on immunotherapy)     - Low risk of Tumor lysis in skin cancer     - s/p kidney BX 6/27   # S/P Living unrelated Renal transplant in 2010     - Continue with home IS medications  # h/o ADPKD  # Acute hyperkalemia, improving with supprotive cares  # Acute Resp failure with hypoxia due to acute on chronic pulmonary edema/ pleural effuison  -s/p throacentisis  -CXR 6/28 re-accumlation  # Alkalemia second to IVF  # Chronic Immunosuppression on medications (sirolimus and prednisone)  # Type 2 DM - last A1c was 6.9%  # Proteinuria  # Atrial fibrillation  # Metastatic Squamous Cell Ca - getting immunotherapy/followed by Dr. Fajardo. He has mets to lymph nodes. On Cemiplib x 3 doses   # Anemia   # Chronic Thrombocytopenia      PLAN:  -DC IVF  Lowell mckeon restart diuretics in am 6/29  - change loklema to daily   - Started Allopurinol 100 mg PO daily - continue   - Continue Midodrine 10 mg PO TID   - Continue Sirolimus at current dose,  - decrease prednisone 10 (driving azotmeia)  - Low salt, Low K diet  - Dose all meds per eGFR       High Complexity management and decision making       Thank you and we will follow closely!

## 2024-06-29 LAB
ANION GAP SERPL CALC-SCNC: 10 MMOL/L (ref 7–16)
BASOPHILS # BLD AUTO: 0.1 % (ref 0–1.8)
BASOPHILS # BLD: 0.01 K/UL (ref 0–0.12)
BUN SERPL-MCNC: 113 MG/DL (ref 8–22)
CALCIUM SERPL-MCNC: 8.8 MG/DL (ref 8.5–10.5)
CHLORIDE SERPL-SCNC: 89 MMOL/L (ref 96–112)
CO2 SERPL-SCNC: 34 MMOL/L (ref 20–33)
CREAT SERPL-MCNC: 2.77 MG/DL (ref 0.5–1.4)
EOSINOPHIL # BLD AUTO: 0 K/UL (ref 0–0.51)
EOSINOPHIL NFR BLD: 0 % (ref 0–6.9)
ERYTHROCYTE [DISTWIDTH] IN BLOOD BY AUTOMATED COUNT: 51.3 FL (ref 35.9–50)
GFR SERPLBLD CREATININE-BSD FMLA CKD-EPI: 24 ML/MIN/1.73 M 2
GLUCOSE BLD STRIP.AUTO-MCNC: 102 MG/DL (ref 65–99)
GLUCOSE BLD STRIP.AUTO-MCNC: 116 MG/DL (ref 65–99)
GLUCOSE BLD STRIP.AUTO-MCNC: 169 MG/DL (ref 65–99)
GLUCOSE SERPL-MCNC: 214 MG/DL (ref 65–99)
HCT VFR BLD AUTO: 33.5 % (ref 42–52)
HGB BLD-MCNC: 10.2 G/DL (ref 14–18)
IMM GRANULOCYTES # BLD AUTO: 0.17 K/UL (ref 0–0.11)
IMM GRANULOCYTES NFR BLD AUTO: 1.8 % (ref 0–0.9)
LYMPHOCYTES # BLD AUTO: 0.19 K/UL (ref 1–4.8)
LYMPHOCYTES NFR BLD: 2.1 % (ref 22–41)
MCH RBC QN AUTO: 25.3 PG (ref 27–33)
MCHC RBC AUTO-ENTMCNC: 30.4 G/DL (ref 32.3–36.5)
MCV RBC AUTO: 83.1 FL (ref 81.4–97.8)
MONOCYTES # BLD AUTO: 0.98 K/UL (ref 0–0.85)
MONOCYTES NFR BLD AUTO: 10.6 % (ref 0–13.4)
NEUTROPHILS # BLD AUTO: 7.88 K/UL (ref 1.82–7.42)
NEUTROPHILS NFR BLD: 85.4 % (ref 44–72)
NRBC # BLD AUTO: 0.02 K/UL
NRBC BLD-RTO: 0.2 /100 WBC (ref 0–0.2)
PLATELET # BLD AUTO: 85 K/UL (ref 164–446)
PLATELETS.RETICULATED NFR BLD AUTO: 13.2 % (ref 0.6–13.1)
PMV BLD AUTO: 12.6 FL (ref 9–12.9)
POTASSIUM SERPL-SCNC: 5.1 MMOL/L (ref 3.6–5.5)
RBC # BLD AUTO: 4.03 M/UL (ref 4.7–6.1)
SODIUM SERPL-SCNC: 133 MMOL/L (ref 135–145)
WBC # BLD AUTO: 9.2 K/UL (ref 4.8–10.8)

## 2024-06-29 PROCEDURE — 700111 HCHG RX REV CODE 636 W/ 250 OVERRIDE (IP): Performed by: INTERNAL MEDICINE

## 2024-06-29 PROCEDURE — 85055 RETICULATED PLATELET ASSAY: CPT

## 2024-06-29 PROCEDURE — A9270 NON-COVERED ITEM OR SERVICE: HCPCS | Performed by: INTERNAL MEDICINE

## 2024-06-29 PROCEDURE — 700102 HCHG RX REV CODE 250 W/ 637 OVERRIDE(OP): Performed by: INTERNAL MEDICINE

## 2024-06-29 PROCEDURE — 82962 GLUCOSE BLOOD TEST: CPT

## 2024-06-29 PROCEDURE — 99233 SBSQ HOSP IP/OBS HIGH 50: CPT | Performed by: HOSPITALIST

## 2024-06-29 PROCEDURE — 770004 HCHG ROOM/CARE - ONCOLOGY PRIVATE *

## 2024-06-29 PROCEDURE — 85025 COMPLETE CBC W/AUTO DIFF WBC: CPT

## 2024-06-29 PROCEDURE — 700102 HCHG RX REV CODE 250 W/ 637 OVERRIDE(OP): Performed by: HOSPITALIST

## 2024-06-29 PROCEDURE — 80048 BASIC METABOLIC PNL TOTAL CA: CPT

## 2024-06-29 PROCEDURE — A9270 NON-COVERED ITEM OR SERVICE: HCPCS | Performed by: HOSPITALIST

## 2024-06-29 RX ORDER — HEPARIN SODIUM 5000 [USP'U]/ML
5000 INJECTION, SOLUTION INTRAVENOUS; SUBCUTANEOUS EVERY 8 HOURS
Status: DISCONTINUED | OUTPATIENT
Start: 2024-06-29 | End: 2024-07-01

## 2024-06-29 RX ORDER — METOPROLOL TARTRATE 50 MG/1
100 TABLET, FILM COATED ORAL 2 TIMES DAILY
Status: DISCONTINUED | OUTPATIENT
Start: 2024-06-29 | End: 2024-07-01

## 2024-06-29 RX ADMIN — SIROLIMUS 4 MG: 0.5 TABLET ORAL at 08:20

## 2024-06-29 RX ADMIN — GABAPENTIN 200 MG: 100 CAPSULE ORAL at 18:04

## 2024-06-29 RX ADMIN — OXYCODONE 5 MG: 5 TABLET ORAL at 12:55

## 2024-06-29 RX ADMIN — GABAPENTIN 200 MG: 100 CAPSULE ORAL at 05:52

## 2024-06-29 RX ADMIN — SENNOSIDES AND DOCUSATE SODIUM 2 TABLET: 50; 8.6 TABLET ORAL at 18:04

## 2024-06-29 RX ADMIN — PREDNISONE 10 MG: 10 TABLET ORAL at 05:53

## 2024-06-29 RX ADMIN — MIDODRINE HYDROCHLORIDE 10 MG: 5 TABLET ORAL at 14:48

## 2024-06-29 RX ADMIN — ALLOPURINOL 100 MG: 100 TABLET ORAL at 05:53

## 2024-06-29 RX ADMIN — METOPROLOL TARTRATE 100 MG: 50 TABLET, FILM COATED ORAL at 19:51

## 2024-06-29 RX ADMIN — OXYCODONE 5 MG: 5 TABLET ORAL at 18:15

## 2024-06-29 RX ADMIN — ATORVASTATIN CALCIUM 80 MG: 80 TABLET, FILM COATED ORAL at 21:14

## 2024-06-29 RX ADMIN — MOMETASONE FUROATE AND FORMOTEROL FUMARATE DIHYDRATE 1 PUFF: 200; 5 AEROSOL RESPIRATORY (INHALATION) at 05:54

## 2024-06-29 RX ADMIN — ASPIRIN 81 MG: 81 TABLET, COATED ORAL at 18:04

## 2024-06-29 RX ADMIN — MOMETASONE FUROATE AND FORMOTEROL FUMARATE DIHYDRATE 1 PUFF: 200; 5 AEROSOL RESPIRATORY (INHALATION) at 19:51

## 2024-06-29 RX ADMIN — SODIUM ZIRCONIUM CYCLOSILICATE 10 G: 10 POWDER, FOR SUSPENSION ORAL at 12:43

## 2024-06-29 RX ADMIN — INSULIN HUMAN 3 UNITS: 100 INJECTION, SOLUTION PARENTERAL at 09:16

## 2024-06-29 RX ADMIN — MIDODRINE HYDROCHLORIDE 10 MG: 5 TABLET ORAL at 05:53

## 2024-06-29 RX ADMIN — HYDROMORPHONE HYDROCHLORIDE 0.25 MG: 1 INJECTION, SOLUTION INTRAMUSCULAR; INTRAVENOUS; SUBCUTANEOUS at 03:30

## 2024-06-29 RX ADMIN — OXYCODONE 5 MG: 5 TABLET ORAL at 06:01

## 2024-06-29 RX ADMIN — MIDODRINE HYDROCHLORIDE 10 MG: 5 TABLET ORAL at 21:14

## 2024-06-29 RX ADMIN — METOPROLOL TARTRATE 100 MG: 50 TABLET, FILM COATED ORAL at 08:21

## 2024-06-29 RX ADMIN — OMEPRAZOLE 20 MG: 20 CAPSULE, DELAYED RELEASE ORAL at 05:53

## 2024-06-29 ASSESSMENT — ENCOUNTER SYMPTOMS
ROS GI COMMENTS: POOR APPETITE
FEVER: 0
SHORTNESS OF BREATH: 1

## 2024-06-29 ASSESSMENT — PAIN DESCRIPTION - PAIN TYPE
TYPE: ACUTE PAIN

## 2024-06-29 NOTE — PROGRESS NOTES
Patient refusing offloading of heels with heel float boots nor pillows. Heel mepilexes in place. Bed extender in place and waffle chair cushion between pt feet and foot of bed. Patient repositioned to be higher in bed with TAPs system. Patient floated with pillows on bilat sides to offload bottom/wound. Barrier cream applied to sacral pressure injury, no sacral mepilex in place due to patient refusing. Education provided and charge RN aware.

## 2024-06-29 NOTE — PROGRESS NOTES
"[facility] -  PROGRESS NOTE               Author: Maylin Sheppard M.D. 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 Fajardo, DM with last A1c of 6.9%, who was seen in Nepohrology clinic and sent to hospital for worsening renal failure and hyperkalemia. He complaints of worsening of shortness of breath, cough with white sputum, bilateral lower extremity edema Recent admissions to [facility] with similar presentation and was discharged just one week ago. Outpatient labs today showed a potassium of 6.0, BUN of 101, creatinine 3.7 and a glucose of 231. In the ER he was found to be hypotensive, blood pressure 83/60. Chest x-ray: Moderate sized bilateral pleural effusions, bibasilar opacities, moderate cardiomegaly. Scr from 6/5 was 2.89 and was 2.85 on discharge on 6/21/24. Nephrology was asked to consult for SANKET. He does have SOB.  No melena, hematochezia, hematemesis.  No HA, visual changes, or abdominal pain. + edema bilateral LE. His immunosupression was recently changed to Sirolimus after diagnosis of metastatic skin cancer.      NEPHROLOGY DAILY PROGRESS:   6/26:Consult note   6/27: No acute events overnight, made 1100 cc urine, creatinine slightly improved from 3.46-3.21, scheduled for kidney transplant biopsy today  6/28: Still with SOB with lelo exertion  6/29: Scr is the same, On 6 L NC, comfortable, Terminal skin CA.     REVIEW OF SYSTEMS:    10 point ROS reviewed and is as per HPI/daily summary or otherwise negative    PMH/PSH/SH/FH:   Reviewed and unchanged since admission note    CURRENT MEDICATIONS:   Reviewed from admission to present day    VS:  /69   Pulse 77   Temp 36.1 °C (97 °F) (Temporal)   Resp 19   Ht 1.981 m (6' 5.99\")   Wt 101 kg " (222 lb 0.1 oz)   SpO2 97%   BMI 25.66 kg/m²     Physical Exam  Vitals and nursing note reviewed.   Constitutional:       General: He is not in acute distress.     Appearance: He is obese. He is ill-appearing.   HENT:      Head: Normocephalic and atraumatic.      Nose: Nose normal.      Mouth/Throat:      Mouth: Mucous membranes are moist.      Pharynx: Oropharynx is clear.   Eyes:      Extraocular Movements: Extraocular movements intact.      Conjunctiva/sclera: Conjunctivae normal.      Pupils: Pupils are equal, round, and reactive to light.   Cardiovascular:      Rate and Rhythm: Normal rate and regular rhythm.   Pulmonary:      Effort: No respiratory distress.      Breath sounds: Rales present.   Abdominal:      General: Abdomen is flat. Bowel sounds are normal.      Palpations: Abdomen is soft.   Musculoskeletal:      Cervical back: Normal range of motion and neck supple.      Right lower leg: Edema present.      Left lower leg: Edema present.   Skin:     Comments: Large axillary wound secondary to underlying squamous cell cancer, small necrotic area, erythematous rash around the wound, no discharge   Neurological:      General: No focal deficit present.      Mental Status: He is alert and oriented to person, place, and time. Mental status is at baseline.   Psychiatric:         Mood and Affect: Mood normal.         Behavior: Behavior normal.         Thought Content: Thought content normal.         Judgment: Judgment normal.      Fluids:  In: 989.3 [P.O.:120]  Out: 700     LABS:  Recent Labs     06/27/24  0324 06/28/24  0335 06/29/24  0340   SODIUM 131* 132* 133*   POTASSIUM 5.6* 4.9 5.1   CHLORIDE 92* 86* 89*   CO2 30 34* 34*   GLUCOSE 291* 156* 214*   * 108* 113*   CREATININE 3.21* 2.90* 2.77*   CALCIUM 8.4* 8.0* 8.8       IMAGING:   All imaging reviewed from admission to present day    IMPRESSION:  # SANKET on CKD and Chronic allograft Nephropathy    - slowly worsening Scr 1.9 few months ago to 2.89  -->3.75    - Unclear etiology ?? ATN vs. AIN (being on immunotherapy)     - Low risk of Tumor lysis in skin cancer     - s/p kidney BX 6/27   # S/P Living unrelated Renal transplant in 2010     - Continue with home IS medications  # h/o ADPKD  # Acute hyperkalemia, improving with supprotive cares  # Acute Resp failure with hypoxia due to acute on chronic pulmonary edema/ pleural effusion 2/2 Squamous Skin CA, confirmed by pleural fluid analysis   -s/p throacentisis  -CXR 6/28 re-accumlation  # Alkalemia second to IVF  # Chronic Immunosuppression on medications (sirolimus and prednisone)  # Type 2 DM - last A1c was 6.9%  # Proteinuria  # Atrial fibrillation  # Metastatic Squamous Cell Ca - getting immunotherapy/followed by Dr. Fajardo. He has mets to lymph nodes. On Cemiplib x 3 doses   # Anemia   # Chronic Thrombocytopenia      PLAN:  - fluid restriction 1 L   - follow Kidney biopsy result ( done 6/27)  - c/w holding diuretics due to high bicarb   - c/w loklema to daily   - Allopurinol 100 mg PO daily - continue   - Continue Midodrine 10 mg PO TID   - Continue Sirolimus at current dose,  - prednisone 10   - Low salt, Low K diet  - Dose all meds per eGFR       High Complexity management and decision making   Discussed with family at bedside

## 2024-06-29 NOTE — CARE PLAN
The patient is Watcher - Medium risk of patient condition declining or worsening    Shift Goals  Clinical Goals: decrease O2 demand, VSS, wound care  Patient Goals: comfort  Family Goals: updates    Progress made toward(s) clinical / shift goals:    Problem: Knowledge Deficit - Standard  Goal: Patient and family/care givers will demonstrate understanding of plan of care, disease process/condition, diagnostic tests and medications  Outcome: Progressing     Problem: Hemodynamics  Goal: Patient's hemodynamics, fluid balance and neurologic status will be stable or improve  Outcome: Progressing     Problem: Respiratory  Goal: Patient will achieve/maintain optimum respiratory ventilation and gas exchange  Outcome: Progressing     Problem: Urinary Elimination  Goal: Establish and maintain regular urinary output  Outcome: Progressing     Problem: Infection - Standard  Goal: Patient will remain free from infection  Outcome: Progressing     Problem: Pain - Standard  Goal: Alleviation of pain or a reduction in pain to the patient’s comfort goal  Outcome: Progressing  Note: Pt assessed for pain regularly and medicated PRN per MAR.      Problem: Fall Risk  Goal: Patient will remain free from falls  Outcome: Progressing       Patient is not progressing towards the following goals:      Problem: Bowel Elimination  Goal: Establish and maintain regular bowel function  Outcome: Not Progressing     Problem: Skin Integrity  Goal: Skin integrity is maintained or improved  Outcome: Not Progressing  Note: Pt more agreeable to skin interventions, however refused several interventions

## 2024-06-29 NOTE — CARE PLAN
The patient is Watcher - Medium risk of patient condition declining or worsening    Shift Goals  Clinical Goals: SPO2>90%, hemodynamic stability  Patient Goals: rest, pain control  Family Goals: JUDITH    Progress made toward(s) clinical / shift goals:        Problem: Knowledge Deficit - Standard  Goal: Patient and family/care givers will demonstrate understanding of plan of care, disease process/condition, diagnostic tests and medications  Outcome: Progressing     Problem: Psychosocial  Goal: Patient's level of anxiety will decrease  Outcome: Progressing  Goal: Patient's ability to verbalize feelings about condition will improve  Outcome: Progressing     Problem: Communication  Goal: The ability to communicate needs accurately and effectively will improve  Outcome: Progressing     Problem: Hemodynamics  Goal: Patient's hemodynamics, fluid balance and neurologic status will be stable or improve  Outcome: Progressing     Problem: Urinary Elimination  Goal: Establish and maintain regular urinary output  Outcome: Progressing     Problem: Mobility  Goal: Patient's capacity to carry out activities will improve  Outcome: Progressing     Problem: Wound/ / Incision Healing  Goal: Patient's wound/surgical incision will decrease in size and heals properly  Outcome: Progressing     Problem: Pain - Standard  Goal: Alleviation of pain or a reduction in pain to the patient’s comfort goal  Outcome: Progressing     Problem: Fall Risk  Goal: Patient will remain free from falls  Outcome: Progressing       Patient is not progressing towards the following goals:      Problem: Skin Integrity  Goal: Skin integrity is maintained or improved  Outcome: Not Progressing

## 2024-06-29 NOTE — PROGRESS NOTES
Patient to [facility] with transport. Family with patient and updated on new patient location. All belongings with patient for transport. VSS, no SOB satting at 97% spO2% before transport on 4L NC. Patient transported on 4L NC. Report called to Dolores MONTENEGRO and pt meds tubed to [facility].

## 2024-06-29 NOTE — PROGRESS NOTES
4 Eyes Skin Assessment Completed by Maya RN and LAN Prather.    Head WDL  Ears Redness and Blanching R ear skin tear scabbed  Nose WDL  Mouth WDL  Neck WDL  Breast/Chest Bruising  Shoulder Blades Redness and Blanching, Edema   Spine Redness and Blanching, Edema   (R) Arm/Elbow/Hand Redness, Blanching, Bruising, Weeping, and Edema  (L) Arm/Elbow/Hand Redness, Blanching, Bruising, Swelling, and Edema  Axilla wound- See Flowsheets   Abdomen Bruising RLQ biopsy site   Groin WDL  Scrotum/Coccyx/Buttocks Redness and Blanching, open pressure wound, yellow, moisture   (R) Leg Bruising, Discoloration  (L) Leg Bruising, Discoloration   (R) Heel/Foot/Toe Redness and Blanching, Bruising, Edema  (L) Heel/Foot/Toe Redness and Blanching, Edema, bruising           Devices In Places ECG, Blood Pressure Cuff, Pulse Ox, and Nasal Cannula, Tubigrip stockings on LUE and BLE       Interventions In Place NC W/Ear Foams, Heel Mepilex, Chair Waffle, TAP System, Pillows, Q2 Turns, Low Air Loss Mattress, Barrier Cream, and Dri-Kaleb Pads    Possible Skin Injury Yes    Pictures Uploaded Into Epic Yes -Already done  Wound Consult Placed Yes- Already done  RN Wound Prevention Protocol Ordered Yes - Already done      hide

## 2024-06-29 NOTE — DOCUMENTATION QUERY
[facility]                                                                       Query Response Note      PATIENT:               CIARA FORRESTER  ACCT #:                  [id number]  MRN:                     [id number]  :                      1955  ADMIT DATE:       2024 6:28 PM  DISCH DATE:          RESPONDING  PROVIDER #:        [id number]           QUERY TEXT:    Documentation in the medical record indicates that this patient is being treated for Pressure Injury Coccyx; Sacrum unstageable,  Suspected deep tissue injury Right anterior ankle    Based on the findings above can this diagnosis be confirmed.    The patient's Clinical Indicators include:  Findings: Pressure Injury Coccyx;Sacrum unstageable present on admission; suspected deep tissue injury Right anterior ankle present on admission    Treatment: wound team barrier paste, offloading tubigrip    Risk Factors: history of ckd, kidney transplant, on immunocsuppression, history of congestive heart failure, squamous cell carcinoma     Christie Mason RN BSN  Clinical Documentation   [email or web address]  Connect via [employer] Messenger    Note: If you agree with a diagnosis listed above, please remember to include it in your concurrent daily documentation and onto the Discharge Summary.  Options provided:   -- Pressure Injury Coccyx;Sacrum unstageable, suspected deep tissue injury Right anterior ankle present on admission   -- Other explanation please specify, (please specify other explanation)      Query created by: Christie Nolen on 2024 5:38 AM    RESPONSE TEXT:    Pressure Injury Coccyx;Sacrum unstageable present on admission          Electronically signed by:  SILVIO STEWART MD 2024 7:01 AM

## 2024-06-29 NOTE — PROGRESS NOTES
Hospital Medicine Daily Progress Note    Date of Service  6/29/2024    Chief Complaint  Jama Altman is a 68 y.o. male admitted 6/25/2024 with shortness of breath and worsening kidney function    Hospital Course  Agapito is a 68 y.o. 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  He was sent for admission by Dr. León/nephrology.  Blood work showed WBC 12.1, hemoglobin 11.5, 9, potassium 6.5, glucose 231, creatinine 3.7, .  His weight is 248 pounds.  Chest x-ray: Moderate sized bilateral pleural effusions, bibasilar opacities, moderate cardiomegaly  He was started on IV bicarb and will be admitted to IMCU  He has blood pressure 83/60.  He states that he always has low blood pressure and is on midodrine at home 10 mg 3 times daily.  He is on 4 L nasal cannula  Currently not in respiratory distress  Patient aware that possibly he will need to be started on dialysis.  He states that lately he has to push hard to urinate     Treatment in ER included dextrose/insulin, IV bicarb, nebulizer of albuterol, IV Lasix 80 mg       Interval Problem Update  6/26/2024: Mr. Altman was evaluated in the IMCU.  He is on IV bicarb drip with 3 amp of bicarb at 150 mL/h.  His creatinine this morning is 3.46 with a potassium 5.9. his significant other, Ashely is at bedside.  I have reached out to Dr. Fajardo his oncologist to let him know he is here.  Thoracentesis was done today with 2 L  out the left lung his uric acid came back over 13 and he has been started on allopurinol.  6/27: Mr. Altman was seen in the CU.  He remains on a bicarb drip at 42 mL/h.  Creatinine today is 3.12.  Potassium remains high at 5.6.  He has been started on allopurinol and his uric acid remains high at 12. Ashely is at bedside. He is on stress-dose steroids and and current blood pressure is 105/65. He is pending a kidney biopsy. Dr. Fajardo saw him this morning. I had a long discussion with Mr. Altman and Ashely about his condition and explained the ongoing IV fluids due to need for intravascular pressure in the setting of progressive extravascular peripheral edema. We discussed that he may require dialysis in order to pull fluid.   6/28: Mr. Altman was evaluated in the [facility]. He has been on a bicarb drip at 42 mL/hour. His Cr is 2.9 today.  300 mL urine over night and 400 mL this morning. I had a long discussion with Mr. Altman, his significant other, Ashely, his daughter Mena, and granddaughter Rayne. We discussed that he has a terminal disease process with respect to the squamous cell and he is on palliative immunotherapy which may be contributing to the worsening renal function. We discussed that he may need to go onto dialysis in order to tolerate the immunotherapy. We await the kidney biopsy.   6/29: Mr. Altman was seen in the [facility]. The bicarb drip was turned off yesterday. His Cr today is 2.77 down from 2.9 yesterday. Kidney biopsy was sent off. He has been tachy afib in the low hundreds and up to 150 with activity. He has been on metoprolol 100 mg BID as an outpatient which was held due to hypotension and will be given this morning.     I have discussed this patient's plan of care and discharge plan at IDT rounds today with Case Management, Nursing, Nursing leadership, and other members of the IDT team.    Consultants/Specialty  nephrology I spoke with Dr. Gutiérrez in person  Oncology I discussed with Dr. Fajardo in person    Code  Status  Full Code    Disposition  The patient is not medically cleared for discharge to home or a post-acute facility.      I have placed the appropriate orders for post-discharge needs.    Review of Systems  Review of Systems   Constitutional:  Positive for malaise/fatigue. Negative for fever.        Generally weak   Respiratory:  Positive for shortness of breath.    Cardiovascular:  Positive for chest pain and leg swelling.   Gastrointestinal:         Poor appetite   All other systems reviewed and are negative.       Physical Exam  Temp:  [36 °C (96.8 °F)-36.1 °C (96.9 °F)] 36.1 °C (96.9 °F)  Pulse:  [] 68  Resp:  [7-24] 16  BP: ()/(61-78) 118/65  SpO2:  [93 %-98 %] 95 %    Physical Exam  Vitals and nursing note reviewed.   Constitutional:       General: He is not in acute distress.     Appearance: He is ill-appearing. He is not toxic-appearing.   HENT:      Mouth/Throat:      Mouth: Mucous membranes are dry.   Cardiovascular:      Rate and Rhythm: Tachycardia present. Rhythm irregular.      Comments: Left back has an edematous region  Pulmonary:      Comments: Rales throughout   Decreased air movement right base  5 liters of oxygen  Abdominal:      General: There is distension.      Tenderness: There is no abdominal tenderness.   Musculoskeletal:      Right lower leg: Edema present.      Left lower leg: Edema present.      Comments: Left arm swollen> right  Left axilla open wound is packed   Skin:     General: Skin is dry.      Coloration: Skin is pale.   Neurological:      General: No focal deficit present.      Mental Status: He is alert and oriented to person, place, and time.   Psychiatric:         Mood and Affect: Mood normal.         Behavior: Behavior normal.         Fluids    Intake/Output Summary (Last 24 hours) at 6/29/2024 0715  Last data filed at 6/28/2024 2000  Gross per 24 hour   Intake 989.32 ml   Output 700 ml   Net 289.32 ml       Laboratory  Recent Labs     06/27/24  9705  06/28/24  0335 06/29/24  0340   WBC 8.5 9.8 9.2   RBC 3.64* 3.63* 4.03*   HEMOGLOBIN 9.2* 9.4* 10.2*   HEMATOCRIT 30.3* 30.2* 33.5*   MCV 83.2 83.2 83.1   MCH 25.3* 25.9* 25.3*   MCHC 30.4* 31.1* 30.4*   RDW 50.8* 50.6* 51.3*   PLATELETCT 79* 76* 85*   MPV 12.4  --  12.6     Recent Labs     06/27/24  0324 06/28/24  0335 06/29/24  0340   SODIUM 131* 132* 133*   POTASSIUM 5.6* 4.9 5.1   CHLORIDE 92* 86* 89*   CO2 30 34* 34*   GLUCOSE 291* 156* 214*   * 108* 113*   CREATININE 3.21* 2.90* 2.77*   CALCIUM 8.4* 8.0* 8.8                   Imaging  DX-CHEST-PORTABLE (1 VIEW)   Final Result      BILATERAL pleural effusions with increased overlying atelectasis and suspected superimposed pulmonary edema or pneumonia      CT-NEEDLE CORE BX-RENAL   Final Result      CT-guided core biopsy of the right lower quadrant transplant kidney.      DX-CHEST-PORTABLE (1 VIEW)   Final Result      1.  Persistent hypoinflation with small bilateral pleural effusions and bibasilar consolidation.   2.  No significant change from prior exam.   3.  No pneumothorax.      US-THORACENTESIS PUNCTURE LEFT   Final Result      1. Ultrasound guided left sided diagnostic and therapeutic thoracentesis.      2. 2050 mL of fluid withdrawn.      US-RENAL TRANSPLANT COMP   Final Result         1.  Normal resistive indices and acceleration time      DX-CHEST-PORTABLE (1 VIEW)   Final Result      1.  Moderate-sized bilateral pleural effusions      2.  Bibasilar airspace opacities consistent with atelectasis, pneumonitis, and/or pleural effusion..      3.  Moderate cardiomegaly.           Assessment/Plan  * SANKET (acute kidney injury) (HCC) on CKD stage III- (present on admission)  Assessment & Plan  Unclear etiology  IV bicarb drip was given and will be turned off due to volume overload  Nephrology consulted  Follow urine output and basic metabolic panel ordered for the morning  Kidney biopsy 6/27  Renal ultrasound did not reveal  abnormalities    Hyperkalemia- (present on admission)  Assessment & Plan  Potassium 6.5, likely secondary to progression of renal failure  Treated in ER with albuterol nebulizer, insulin/dextrose, IV bicarb  Continue bicarb, ordered Lokelma, renal diet  Continuous telemetry monitoring to evaluate for arrhythmias  Dialysis consideration as per nephrology after kidney biospy      Pleural effusion- (present on admission)  Assessment & Plan  Bilateral moderate pleural effusions noted.  Likely secondary to volume overload,  2 liters off the left side on 6/26.   Cells are + for squamous cell carcinoma thus it is a malignant pleural effusion.    Hypotension- (present on admission)  Assessment & Plan  May be combination of intravascular dehydration with adrenal insufficiency  IV hydrocortisone for stress-doseing, wean to home prednisone 20 mg daily   IV fluids with bicarb drip were given  Continue midodrine  Monitor closely in IMCU     Lymphedema on LUE- (present on admission)  Assessment & Plan  Continue compression therapy  He cannot tolerate diuresis yet due to decompensated renal function    HFmrEF- (present on admission)  Assessment & Plan  EF 45%  Hold Lasix  Nephrology consult    Stage 4 squamous cell cancer of left axillary region complicated by left axillary abcscess s/p drainage- (present on admission)  Assessment & Plan  Continue daily dressing changes, wound care left axilla  With malignant pleural effusion  He has been on palliative immunotherapy  Last immunotherapy by Dr. Fajardo was 3 weeks ago though he has not been tolerating it.   Dr. Fajardo has consulted    Long term (current) use of systemic steroids- (present on admission)  Assessment & Plan  Immunocompromised.  On prednisone 20 mg once a day  Consider Bactrim for PCP prophylaxis    Abdominal aortic aneurysm (AAA) without rupture (HCC)- (present on admission)  Assessment & Plan  History of    Secondary adrenal insufficiency (HCC)- (present on  admission)  Assessment & Plan  Secondary to chronic steroid use  IV hydrocortisone was given and now transition back to prednisone 20 mg now that kidney biopsy has been done and blood pressure has gone up    Acute respiratory failure with hypoxia ([facility])- (present on admission)  Assessment & Plan  Patient states he has been on oxygen since Friday  Chest x-ray showed bilateral moderate pleural effusion, bilateral airspace disease  , Discharged on 6/21 from this hospital after treatment for pneumonia with 14 days of levofloxacin and for volume overload with IV Lasix  Status post thoracentesis on the left side  Consideration for diuresis though limited by his kidney disease versus dialysis  Currently on 5 L nasal cannula  Chest xray on 6/28 reveals pleural effusions, pulmonary edema thus stopped IV fluids    A-fib ([facility]) s/p Watchman- (present on admission)  Assessment & Plan  Noted.  Not on anticoagulation    Type 2 diabetes mellitus ([facility])- (present on admission)  Assessment & Plan  Uncontrolled with ongoing hyperglycemia   Increase Lantus from 20 units every evening to 25 units, insulin sliding scale   Decreased steroids down to 20 mg of prednisone which is his recent baseline  Diabetic diet  HbA1c 11.2    Thrombocytopenia ([facility])- (present on admission)  Assessment & Plan  Platelet count 76  Follow up platelets in the morning    Primary hypertension- (present on admission)  Assessment & Plan  Will hold Toprol due to hypotension    History of renal transplant- (present on admission)  Assessment & Plan  Poor renal function  Continue sirolimus, prednisone  Transplant kidney biopsy ordered was done 6/27  Nephrology consulted         VTE prophylaxis:    heparin ppx      I have performed a physical exam and reviewed and updated ROS and Plan today (6/29/2024). In review of yesterday's note (6/28/2024), there are no changes except as documented above.

## 2024-06-30 VITALS
HEART RATE: 91 BPM | TEMPERATURE: 97.4 F | RESPIRATION RATE: 25 BRPM | BODY MASS INDEX: 25.69 KG/M2 | WEIGHT: 222 LBS | HEIGHT: 78 IN | SYSTOLIC BLOOD PRESSURE: 101 MMHG | OXYGEN SATURATION: 96 % | DIASTOLIC BLOOD PRESSURE: 57 MMHG

## 2024-06-30 PROBLEM — Z66 DNR (DO NOT RESUSCITATE): Status: ACTIVE | Noted: 2024-06-30

## 2024-06-30 LAB
ANION GAP SERPL CALC-SCNC: 11 MMOL/L (ref 7–16)
ANISOCYTOSIS BLD QL SMEAR: ABNORMAL
BACTERIA FLD AEROBE CULT: NORMAL
BASOPHILS # BLD AUTO: 0.1 % (ref 0–1.8)
BASOPHILS # BLD: 0.01 K/UL (ref 0–0.12)
BUN SERPL-MCNC: 107 MG/DL (ref 8–22)
CALCIUM SERPL-MCNC: 9 MG/DL (ref 8.5–10.5)
CHLORIDE SERPL-SCNC: 91 MMOL/L (ref 96–112)
CO2 SERPL-SCNC: 33 MMOL/L (ref 20–33)
COMMENT 1642: NORMAL
CREAT SERPL-MCNC: 2.63 MG/DL (ref 0.5–1.4)
EOSINOPHIL # BLD AUTO: 0.02 K/UL (ref 0–0.51)
EOSINOPHIL NFR BLD: 0.2 % (ref 0–6.9)
ERYTHROCYTE [DISTWIDTH] IN BLOOD BY AUTOMATED COUNT: 52.2 FL (ref 35.9–50)
GFR SERPLBLD CREATININE-BSD FMLA CKD-EPI: 26 ML/MIN/1.73 M 2
GLUCOSE BLD STRIP.AUTO-MCNC: 102 MG/DL (ref 65–99)
GLUCOSE BLD STRIP.AUTO-MCNC: 113 MG/DL (ref 65–99)
GLUCOSE BLD STRIP.AUTO-MCNC: 208 MG/DL (ref 65–99)
GLUCOSE SERPL-MCNC: 124 MG/DL (ref 65–99)
GRAM STN SPEC: NORMAL
HCT VFR BLD AUTO: 34.8 % (ref 42–52)
HGB BLD-MCNC: 10.2 G/DL (ref 14–18)
HYPOCHROMIA BLD QL SMEAR: ABNORMAL
IMM GRANULOCYTES # BLD AUTO: 0.15 K/UL (ref 0–0.11)
IMM GRANULOCYTES NFR BLD AUTO: 1.4 % (ref 0–0.9)
LYMPHOCYTES # BLD AUTO: 0.27 K/UL (ref 1–4.8)
LYMPHOCYTES NFR BLD: 2.4 % (ref 22–41)
MCH RBC QN AUTO: 24.9 PG (ref 27–33)
MCHC RBC AUTO-ENTMCNC: 29.3 G/DL (ref 32.3–36.5)
MCV RBC AUTO: 84.9 FL (ref 81.4–97.8)
MICROCYTES BLD QL SMEAR: ABNORMAL
MONOCYTES # BLD AUTO: 1.27 K/UL (ref 0–0.85)
MONOCYTES NFR BLD AUTO: 11.5 % (ref 0–13.4)
MORPHOLOGY BLD-IMP: NORMAL
NEUTROPHILS # BLD AUTO: 9.32 K/UL (ref 1.82–7.42)
NEUTROPHILS NFR BLD: 84.4 % (ref 44–72)
NRBC # BLD AUTO: 0.03 K/UL
NRBC BLD-RTO: 0.3 /100 WBC (ref 0–0.2)
OVALOCYTES BLD QL SMEAR: NORMAL
PLATELET # BLD AUTO: 94 K/UL (ref 164–446)
PLATELET BLD QL SMEAR: NORMAL
PLATELETS.RETICULATED NFR BLD AUTO: 12.6 % (ref 0.6–13.1)
POIKILOCYTOSIS BLD QL SMEAR: NORMAL
POTASSIUM SERPL-SCNC: 4.7 MMOL/L (ref 3.6–5.5)
RBC # BLD AUTO: 4.1 M/UL (ref 4.7–6.1)
RBC BLD AUTO: PRESENT
SIGNIFICANT IND 70042: NORMAL
SITE SITE: NORMAL
SODIUM SERPL-SCNC: 135 MMOL/L (ref 135–145)
SOURCE SOURCE: NORMAL
WBC # BLD AUTO: 11 K/UL (ref 4.8–10.8)

## 2024-06-30 PROCEDURE — 700102 HCHG RX REV CODE 250 W/ 637 OVERRIDE(OP): Performed by: INTERNAL MEDICINE

## 2024-06-30 PROCEDURE — 0W9B3ZZ DRAINAGE OF LEFT PLEURAL CAVITY, PERCUTANEOUS APPROACH: ICD-10-PCS | Performed by: STUDENT IN AN ORGANIZED HEALTH CARE EDUCATION/TRAINING PROGRAM

## 2024-06-30 PROCEDURE — 32554 ASPIRATE PLEURA W/O IMAGING: CPT

## 2024-06-30 PROCEDURE — A9270 NON-COVERED ITEM OR SERVICE: HCPCS | Performed by: INTERNAL MEDICINE

## 2024-06-30 PROCEDURE — 80048 BASIC METABOLIC PNL TOTAL CA: CPT

## 2024-06-30 PROCEDURE — 97602 WOUND(S) CARE NON-SELECTIVE: CPT

## 2024-06-30 PROCEDURE — 770000 HCHG ROOM/CARE - INTERMEDIATE ICU *

## 2024-06-30 PROCEDURE — 700111 HCHG RX REV CODE 636 W/ 250 OVERRIDE (IP): Performed by: INTERNAL MEDICINE

## 2024-06-30 PROCEDURE — 99291 CRITICAL CARE FIRST HOUR: CPT | Performed by: HOSPITALIST

## 2024-06-30 PROCEDURE — 700102 HCHG RX REV CODE 250 W/ 637 OVERRIDE(OP): Performed by: HOSPITALIST

## 2024-06-30 PROCEDURE — 85025 COMPLETE CBC W/AUTO DIFF WBC: CPT

## 2024-06-30 PROCEDURE — 82962 GLUCOSE BLOOD TEST: CPT | Mod: 91

## 2024-06-30 PROCEDURE — 94640 AIRWAY INHALATION TREATMENT: CPT

## 2024-06-30 PROCEDURE — A9270 NON-COVERED ITEM OR SERVICE: HCPCS | Performed by: HOSPITALIST

## 2024-06-30 PROCEDURE — 700111 HCHG RX REV CODE 636 W/ 250 OVERRIDE (IP): Mod: JZ | Performed by: HOSPITALIST

## 2024-06-30 PROCEDURE — 32554 ASPIRATE PLEURA W/O IMAGING: CPT | Performed by: STUDENT IN AN ORGANIZED HEALTH CARE EDUCATION/TRAINING PROGRAM

## 2024-06-30 PROCEDURE — 85055 RETICULATED PLATELET ASSAY: CPT

## 2024-06-30 RX ORDER — FUROSEMIDE 10 MG/ML
80 INJECTION INTRAMUSCULAR; INTRAVENOUS ONCE
Status: COMPLETED | OUTPATIENT
Start: 2024-06-30 | End: 2024-06-30

## 2024-06-30 RX ORDER — OXYCODONE HYDROCHLORIDE 5 MG/1
5 TABLET ORAL
Status: DISCONTINUED | OUTPATIENT
Start: 2024-06-30 | End: 2024-07-01

## 2024-06-30 RX ORDER — HYDROMORPHONE HYDROCHLORIDE 1 MG/ML
0.5 INJECTION, SOLUTION INTRAMUSCULAR; INTRAVENOUS; SUBCUTANEOUS
Status: DISCONTINUED | OUTPATIENT
Start: 2024-06-30 | End: 2024-07-01

## 2024-06-30 RX ORDER — OXYCODONE HYDROCHLORIDE 10 MG/1
10 TABLET ORAL
Status: DISCONTINUED | OUTPATIENT
Start: 2024-06-30 | End: 2024-07-01

## 2024-06-30 RX ADMIN — MIDODRINE HYDROCHLORIDE 10 MG: 5 TABLET ORAL at 14:43

## 2024-06-30 RX ADMIN — ATORVASTATIN CALCIUM 80 MG: 80 TABLET, FILM COATED ORAL at 22:52

## 2024-06-30 RX ADMIN — INSULIN HUMAN 4 UNITS: 100 INJECTION, SOLUTION PARENTERAL at 18:59

## 2024-06-30 RX ADMIN — METOPROLOL TARTRATE 100 MG: 50 TABLET, FILM COATED ORAL at 17:36

## 2024-06-30 RX ADMIN — OMEPRAZOLE 20 MG: 20 CAPSULE, DELAYED RELEASE ORAL at 04:41

## 2024-06-30 RX ADMIN — HYDROMORPHONE HYDROCHLORIDE 0.25 MG: 1 INJECTION, SOLUTION INTRAMUSCULAR; INTRAVENOUS; SUBCUTANEOUS at 09:13

## 2024-06-30 RX ADMIN — OXYCODONE 5 MG: 5 TABLET ORAL at 08:21

## 2024-06-30 RX ADMIN — SIROLIMUS 4 MG: 0.5 TABLET ORAL at 04:42

## 2024-06-30 RX ADMIN — INSULIN GLARGINE-YFGN 25 UNITS: 100 INJECTION, SOLUTION SUBCUTANEOUS at 18:59

## 2024-06-30 RX ADMIN — FUROSEMIDE 80 MG: 10 INJECTION, SOLUTION INTRAVENOUS at 09:43

## 2024-06-30 RX ADMIN — MOMETASONE FUROATE AND FORMOTEROL FUMARATE DIHYDRATE 1 PUFF: 200; 5 AEROSOL RESPIRATORY (INHALATION) at 04:42

## 2024-06-30 RX ADMIN — MOMETASONE FUROATE AND FORMOTEROL FUMARATE DIHYDRATE 1 PUFF: 200; 5 AEROSOL RESPIRATORY (INHALATION) at 18:59

## 2024-06-30 RX ADMIN — GABAPENTIN 200 MG: 100 CAPSULE ORAL at 04:41

## 2024-06-30 RX ADMIN — INSULIN HUMAN 3 UNITS: 100 INJECTION, SOLUTION PARENTERAL at 22:58

## 2024-06-30 RX ADMIN — MIDODRINE HYDROCHLORIDE 10 MG: 5 TABLET ORAL at 22:52

## 2024-06-30 RX ADMIN — METOPROLOL TARTRATE 100 MG: 50 TABLET, FILM COATED ORAL at 04:41

## 2024-06-30 RX ADMIN — PREDNISONE 10 MG: 10 TABLET ORAL at 04:43

## 2024-06-30 RX ADMIN — ASPIRIN 81 MG: 81 TABLET, COATED ORAL at 17:36

## 2024-06-30 RX ADMIN — OXYCODONE 5 MG: 5 TABLET ORAL at 05:11

## 2024-06-30 RX ADMIN — ALLOPURINOL 100 MG: 100 TABLET ORAL at 04:41

## 2024-06-30 RX ADMIN — MIDODRINE HYDROCHLORIDE 10 MG: 5 TABLET ORAL at 04:41

## 2024-06-30 RX ADMIN — GABAPENTIN 200 MG: 100 CAPSULE ORAL at 17:36

## 2024-06-30 RX ADMIN — SODIUM ZIRCONIUM CYCLOSILICATE 10 G: 10 POWDER, FOR SUSPENSION ORAL at 14:42

## 2024-06-30 ASSESSMENT — COPD QUESTIONNAIRES
DO YOU EVER COUGH UP ANY MUCUS OR PHLEGM?: NO/ONLY WITH OCCASIONAL COLDS OR INFECTIONS
HAVE YOU SMOKED AT LEAST 100 CIGARETTES IN YOUR ENTIRE LIFE: NO/DON'T KNOW
COPD SCREENING SCORE: 4
DURING THE PAST 4 WEEKS HOW MUCH DID YOU FEEL SHORT OF BREATH: MOST  OR ALL OF THE TIME

## 2024-06-30 ASSESSMENT — ENCOUNTER SYMPTOMS
SHORTNESS OF BREATH: 1
FEVER: 0
SORE THROAT: 0
PALPITATIONS: 1
HEMOPTYSIS: 0

## 2024-06-30 ASSESSMENT — PAIN DESCRIPTION - PAIN TYPE
TYPE: ACUTE PAIN
TYPE: ACUTE PAIN;CHRONIC PAIN
TYPE: ACUTE PAIN

## 2024-06-30 NOTE — PROGRESS NOTES
4 Eyes Skin Assessment Completed by LAN Park and LAN Jay.    Head top back of head scar healed  Ears Redness and Blanching  Nose Redness and Blanching  Mouth WDL  Neck WDL  Breast/Chest Redness, Bruising, and Discoloration, wound under left axilla with wound team following  Shoulder Blades WDL  Spine Redness and Bruising large bruising to bilateral flank areas with edema  (R) Arm/Elbow/Hand Redness, Blanching, Bruising, Swelling, Weeping, and Edema  (L) Arm/Elbow/Hand Redness, Blanching, Bruising, Swelling, Discoloration, Weeping, and Edema  Abdomen Bruising bilateral flanks and hips  Groin WDL  Scrotum/Coccyx/Buttocks sacral wound, wound team consulted  (R) Leg Redness, Blanching, Bruising, and Swelling weeping and edema  (L) Leg Redness, Blanching, Bruising, Swelling, Weeping, and Edema  (R) Heel/Foot/Toe Redness and Boggy, flaky, red non blanching to left inner heel  (L) Heel/Foot/Toe Redness and Boggy, flaky          Devices In Places ECG, Blood Pressure Cuff, Pulse Ox, Epps, SCD's, HFNC, and EDWARD's      Interventions In Place Heel Mepilex, Sacral Mepilex, TAP System, Pillows, Q2 Turns, and Low Air Loss Mattress    Possible Skin Injury Yes    Pictures Uploaded Into Epic Yes  Wound Consult Placed Yes  RN Wound Prevention Protocol Ordered Yes      Pictures below

## 2024-06-30 NOTE — PROGRESS NOTES
Patient is alert and oriented. Declines skin assessment or turns at this time, education provided but still declines. Patient also not agreeable to Blood glucose checks and heparin, education provided.  Call light within reach. Hourly rounding ion place.

## 2024-06-30 NOTE — CARE PLAN
The patient is Watcher - Medium risk of patient condition declining or worsening    Shift Goals  Clinical Goals: decrease o2 demand, wound care  Patient Goals: breathe easier  Family Goals: updates    Progress made toward(s) clinical / shift goals:    Problem: Knowledge Deficit - Standard  Goal: Patient and family/care givers will demonstrate understanding of plan of care, disease process/condition, diagnostic tests and medications  Description: Target End Date:  1-3 days or as soon as patient condition allows    Document in Patient Education    1.  Patient and family/caregiver oriented to unit, equipment, visitation policy and means for communicating concern  2.  Complete/review Learning Assessment  3.  Assess knowledge level of disease process/condition, treatment plan, diagnostic tests and medications  4.  Explain disease process/condition, treatment plan, diagnostic tests and medications  Outcome: Progressing     Problem: Respiratory  Goal: Patient will achieve/maintain optimum respiratory ventilation and gas exchange  Description: Target End Date:  Prior to discharge or change in level of care    Document on Assessment flowsheet    1.  Assess and monitor rate, rhythm, depth and effort of respiration  2.  Breath sounds assessed qshift and/or as needed  3.  Assess O2 saturation, administer/titrate oxygen as ordered  4.  Position patient for maximum ventilatory efficiency  5.  Turn, cough, and deep breath with splinting to improve effectiveness  6.  Collaborate with RT to administer medication/treatments per order  7.  Encourage use of incentive spirometer and encourage patient to cough after use and utilize splinting techniques if applicable  8.  Airway suctioning  9.  Monitor sputum production for changes in color, consistency and frequency  10. Perform frequent oral hygiene  11. Alternate physical activity with rest periods  Outcome: Progressing       Patient is not progressing towards the following  goals:      Problem: Skin Integrity  Goal: Skin integrity is maintained or improved  Description: Target End Date:  Prior to discharge or change in level of care    Document interventions on Skin Risk/Javad flowsheet groups and corresponding LDA    1.  Assess and monitor skin integrity, appearance and/or temperature  2.  Assess risk factors for impaired skin integrity and/or pressures ulcers  3.  Implement precautions to protect skin integrity in collaboration with interdisciplinary team  4.  Implement pressure ulcer prevention protocol if at risk for skin breakdown  5.  Confirm wound care consult if at risk for skin breakdown  6.  Ensure patient use of pressure relieving devices  (Low air loss bed, waffle overlay, heel protectors, ROHO cushion, etc)  Outcome: Not Progressing  Note: Pt declines repositioning at times, pt educated. Floating sacrum - 2 pillows under each buttock. Offered to change from ICU bed to standard bed as is longer, pt refused. Wound care given this shift. Heel mepliex bilat, heels floated with waffle cushion.

## 2024-06-30 NOTE — PROGRESS NOTES
"[facility] -  PROGRESS NOTE               Author: Maylin Sheppard M.D. Date & Time: 6/30/2024  10:56 AM     HPI:  Patient is a 68 year old male with a PMHx of CKD stage 4/5, S/P Living unrelated Renal transplant in 2010, PCKD, afib, CAD, HLD, HTN, and recently diagnosed squamous cell carcinoma of the left axillary region on immunotherapy via Dr. Fajardo, DM with last A1c of 6.9%, who was seen in Nepohrology clinic and sent to hospital for worsening renal failure and hyperkalemia. He complaints of worsening of shortness of breath, cough with white sputum, bilateral lower extremity edema Recent admissions to [facility] with similar presentation and was discharged just one week ago. Outpatient labs today showed a potassium of 6.0, BUN of 101, creatinine 3.7 and a glucose of 231. In the ER he was found to be hypotensive, blood pressure 83/60. Chest x-ray: Moderate sized bilateral pleural effusions, bibasilar opacities, moderate cardiomegaly. Scr from 6/5 was 2.89 and was 2.85 on discharge on 6/21/24. Nephrology was asked to consult for SANKET. He does have SOB.  No melena, hematochezia, hematemesis.  No HA, visual changes, or abdominal pain. + edema bilateral LE. His immunosupression was recently changed to Sirolimus after diagnosis of metastatic skin cancer.      NEPHROLOGY DAILY PROGRESS:   6/26:Consult note   6/27: No acute events overnight, made 1100 cc urine, creatinine slightly improved from 3.46-3.21, scheduled for kidney transplant biopsy today  6/28: Still with SOB with lelo exertion  6/29: Scr is the same, On 6 L NC, comfortable, Terminal skin CA.   6/30\": respiratory status deteriorated, wosre SOB. , on HFNC 40%, CXR with worsening pleural effusion from both sides.  cc       REVIEW OF SYSTEMS:    10 point ROS reviewed and is as per HPI/daily summary or otherwise negative    PMH/PSH/SH/FH:   Reviewed and unchanged since admission note    CURRENT MEDICATIONS:   Reviewed from " "admission to present day    VS:  /74   Pulse 83   Temp 36.6 °C (97.9 °F) (Temporal)   Resp 12   Ht 1.981 m (6' 5.99\")   Wt 101 kg (222 lb 0.1 oz)   SpO2 93%   BMI 25.66 kg/m²     Physical Exam  Vitals and nursing note reviewed.   Constitutional:       General: He is not in acute distress.     Appearance: He is obese. He is ill-appearing.   HENT:      Head: Normocephalic and atraumatic.      Nose: Nose normal.      Mouth/Throat:      Mouth: Mucous membranes are moist.      Pharynx: Oropharynx is clear.   Eyes:      Extraocular Movements: Extraocular movements intact.      Conjunctiva/sclera: Conjunctivae normal.      Pupils: Pupils are equal, round, and reactive to light.   Cardiovascular:      Rate and Rhythm: Normal rate and regular rhythm.   Pulmonary:      Effort: No respiratory distress.      Breath sounds: Rales present.   Abdominal:      General: Abdomen is flat. Bowel sounds are normal.      Palpations: Abdomen is soft.   Musculoskeletal:      Cervical back: Normal range of motion and neck supple.     LE : no edema, all edema in UE BL   Skin:     Comments: Large axillary wound secondary to underlying squamous cell cancer, small necrotic area, erythematous rash around the wound, no discharge   Neurological:      General: No focal deficit present.      Mental Status: He is alert and oriented to person, place, and time. Mental status is at baseline.   Psychiatric:         Mood and Affect: Mood normal.         Behavior: Behavior normal.         Thought Content: Thought content normal.         Judgment: Judgment normal.      Fluids:  In: -   Out: 350     LABS:  Recent Labs     06/28/24  0335 06/29/24  0340 06/30/24  0039   SODIUM 132* 133* 135   POTASSIUM 4.9 5.1 4.7   CHLORIDE 86* 89* 91*   CO2 34* 34* 33   GLUCOSE 156* 214* 124*   * 113* 107*   CREATININE 2.90* 2.77* 2.63*   CALCIUM 8.0* 8.8 9.0       IMAGING:   All imaging reviewed from admission to present day    IMPRESSION:  # SANKET on CKD " and Chronic allograft Nephropathy    - slowly worsening Scr 1.9 few months ago to 2.89 -->3.75    - Unclear etiology ?? ATN vs. AIN (being on immunotherapy)     - Low risk of Tumor lysis in skin cancer     - s/p kidney BX 6/27   # S/P Living unrelated Renal transplant in 2010     - Continue with home IS medications  # h/o ADPKD  # Acute hyperkalemia, improving with supprotive cares  # Acute Resp failure with hypoxia due to acute on chronic pulmonary edema/ pleural effusion 2/2 Squamous Skin CA, confirmed by pleural fluid analysis   -s/p throacentisis  -CXR 6/28 re-accumlation  # Alkalemia second to IVF  # Chronic Immunosuppression on medications (sirolimus and prednisone)  # Type 2 DM - last A1c was 6.9%  # Proteinuria  # Atrial fibrillation  # Metastatic Squamous Cell Ca - getting immunotherapy/followed by Dr. Fajardo. He has mets to lymph nodes. On Cemiplib x 3 doses   # Anemia   # Chronic Thrombocytopenia      PLAN:  -  no absolute indication for Hd today, contunue daily evaluation for HD need  - fluid restriction 1 L   - follow Kidney biopsy result ( done 6/27)  - restart Lasix 80 IV today to keep in negative balance. Elevated Bicarb is 2/2 bicarb ggt. Follow Sbicarb closely ( might need diamox)   - CXR with worsening pleural effusion  BL _. Discussed with hospitalist, thoracocentesis is planned   - c/w loklema to daily   - Allopurinol 100 mg PO daily - continue   - Continue Midodrine 10 mg PO TID   - Continue Sirolimus at current dose,  - prednisone 10   - Low salt, Low K diet  - Dose all meds per eGFR   - oncology recs       High Complexity management and decision making   Discussed with family at bedside and hospitalist, RN

## 2024-06-30 NOTE — PROGRESS NOTES
Hematology & Oncology Daily Progress Note    Date of Service  6/30/2024    Chief Complaint  Jama Altman is a 68 y.o. male admitted 6/25/2024 with met SCC of skin. On [facility]    Hospital Course  No notes on file    Interval Problem Update  Remains in resp distress. SO at bedsice. Nephrology note appreciated  Consultants/Specialty  nephrology    Code Status  DNAR/DNI    Disposition  <MEDICALLYCLEARED>  I have placed the appropriate orders for post-discharge needs.    Review of Systems  Review of Systems   Constitutional:  Positive for malaise/fatigue. Negative for fever.   HENT:  Negative for hearing loss and sore throat.    Respiratory:  Positive for shortness of breath. Negative for hemoptysis.    Cardiovascular:  Positive for palpitations. Negative for chest pain.        Physical Exam  Temp:  [36.1 °C (97 °F)-36.6 °C (97.9 °F)] 36.6 °C (97.9 °F)  Pulse:  [83-94] 83  Resp:  [12-24] 12  BP: (104-117)/(64-76) 117/74  SpO2:  [88 %-100 %] 95 %    Physical Exam  Constitutional:       Appearance: He is ill-appearing.   Eyes:      General: No scleral icterus.     Pupils: Pupils are equal, round, and reactive to light.   Pulmonary:      Effort: Respiratory distress present.      Breath sounds: No wheezing or rales.   Abdominal:      General: Abdomen is flat. There is no distension.      Palpations: Abdomen is soft.   Neurological:      General: No focal deficit present.      Mental Status: He is alert and oriented to person, place, and time.         Fluids  No intake or output data in the 24 hours ending 06/30/24 1336    Laboratory  Recent Labs     06/28/24  0335 06/29/24  0340 06/30/24  0039   WBC 9.8 9.2 11.0*   RBC 3.63* 4.03* 4.10*   HEMOGLOBIN 9.4* 10.2* 10.2*   HEMATOCRIT 30.2* 33.5* 34.8*   MCV 83.2 83.1 84.9   MCH 25.9* 25.3* 24.9*   MCHC 31.1* 30.4* 29.3*   RDW 50.6* 51.3* 52.2*   PLATELETCT 76* 85* 94*   MPV  --  12.6  --      Recent Labs     06/28/24  0335 06/29/24  0340 06/30/24  0039   SODIUM 132* 133*  135   POTASSIUM 4.9 5.1 4.7   CHLORIDE 86* 89* 91*   CO2 34* 34* 33   GLUCOSE 156* 214* 124*   * 113* 107*   CREATININE 2.90* 2.77* 2.63*   CALCIUM 8.0* 8.8 9.0                   Imaging      Assessment/Plan  No new Assessment & Plan notes have been filed under this hospital service since the last note was generated.  Service: Hematology & Oncology   SCC-Prognosis is grim. Unlikely a candidiate for more Ceniplimib.  Resp-?volume overload. Managemet per HS and nephrology. On diuretics-  Renal-await bx results  Will sign off. Reconsult PRN        I have performed a physical exam and reviewed and updated ROS and Plan today (6/30/2024). In review of yesterday's note (6/29/2024), there are no changes except as documented above.

## 2024-06-30 NOTE — WOUND TEAM
[facility]  Inpatient Services  Wound and Skin Care Follow-up    Admission Date: 6/25/2024     Last order of IP CONSULT TO WOUND CARE was found on 6/27/2024 from Hospital Encounter on 6/25/2024     HPI, PMH, SH: Reviewed    Past Surgical History:   Procedure Laterality Date    STENT PLACEMENT  2013    cardiac    KNEE MANIPULATION  02/16/2012    Performed by LATOYA CONNER at SURGERY [facility]    KNEE UNICOMPARTMENTAL  12/23/2011    Performed by LATOYA CONNER at SURGERY [facility] ORS    KNEE ARTHROSCOPY  12/23/2011    Performed by LATOYA CONNER at SURGERY [facility] ORS    KNEE ARTHROSCOPY  05/03/2011    Performed by HANANE GOLDMAN at SURGERY SAME DAY [facility]    MENISCECTOMY, KNEE, MEDIAL  05/03/2011    Performed by HANANE GOLDMAN at SURGERY SAME DAY [facility]    OTHER  09/10/2010    RIGHT KIDNEY TRANSPLANT    KNEE ARTHROPLASTY TOTAL  01/12/2007    RIGHT    KNEE ARTHROSCOPY  04/10/2006    RIGHT    OTHER ORTHOPEDIC SURGERY  07/08/1974    LEFT KNEE DEBRIDEMENT     Social History     Tobacco Use    Smoking status: Never     Passive exposure: Never    Smokeless tobacco: Never   Substance Use Topics    Alcohol use: No     Chief Complaint   Patient presents with    Abnormal Labs     Pt sent by nephrology due to elevated BUN and creatinine along with electrolyte abnormalities.  Pt also endorses increased swelling to lower extremities.  Pt is currently receiving immunotherapy and radiation for squamous cell carcinoma.       Diagnosis: SANKET (acute kidney injury) (HCC) [N17.9]    Unit where seen by Wound Team: T907/01     WOUND FOLLOW UP RELATED TO:  RL axilla, check heels       WOUND TEAM PLAN OF CARE - Frequency of Follow-up:   Nursing to follow dressing orders written for wound care. Contact wound team if area fails to progress, deteriorates or with any questions/concerns if something comes up before next scheduled follow up (See below as to whether wound is following and frequency of  wound follow up)  Dressing changes by wound team:                   Weekly - L axilla    WOUND HISTORY:       Pt is a 68yr old gentleman with history of PAD, CHF, A. Fib, AAA, Polycystic Kidney (S/p transplant and on chronic immunosuppression), HTN who now has Stage IV squamous cell carcinoma complicated by left axillary abscess s/p drain placement. Pt currently going to outpatient wound clinic for wet to dry dressing changes weekly. Wife performs dressing changes between appointments. Pt has dressing changed 2x daily. Wound team was consulted to assist with managing wound while inpatient.        WOUND ASSESSMENT/LDA  Wound 06/11/24 Soft Tissue Necrosis Axilla Left (Active)   Date First Assessed/Time First Assessed: 06/11/24 2245   Present on Original Admission: Yes  Hand Hygiene Completed: Yes  Primary Wound Type: Soft Tissue Necrosis  Location: Axilla  Laterality: Left      Assessments 6/30/2024  1:00 PM         Site Assessment Red;Yellow   Periwound Assessment Red;Hyperpigmented;Irritation;Painful;Satellite lesions   Margins Defined edges   Closure None   Drainage Amount Scant   Drainage Description Serosanguineous   Treatments Cleansed   Wound Cleansing Normal Saline Irrigation   Periwound Protectant No-sting Skin Prep   Dressing Status Intact   Dressing Changed Changed   Dressing Cleansing/Solutions Normal Saline   Dressing Options Moist Roll Gauze;Absorbent Abdominal Pad;Hypafix Tape   Dressing Change/Treatment Frequency Every Shift, and As Needed   NEXT Dressing Change/Treatment Date 06/30/24   NEXT Weekly Photo (Inpatient Only) 07/07/24   Non-staged Wound Description Full thickness   Wound Length (cm) 5 cm   Wound Width (cm) 7 cm   Wound Depth (cm) 4.5 cm   Wound Surface Area (cm^2) 35 cm^2   Wound Volume (cm^3) 157.5 cm^3   Wound Healing % -5   Shape irregular oval   Wound Odor Mild       Wound 06/12/24 Pressure Injury Coccyx;Sacrum unstageable POA (Active)   Date First Assessed/Time First Assessed:  06/12/24 1300   Present on Original Admission: Yes  Hand Hygiene Completed: Yes  Primary Wound Type: Pressure Injury  Location: Coccyx;Sacrum  Wound Description (Comments): unstageable POA      Assessments 6/30/2024  1:00 PM   Wound Image     Dressing Status Intact   Dressing Options Hydrocolloid Thick   Dressing Change/Treatment Frequency Every 72 hrs, and As Needed   NEXT Dressing Change/Treatment Date 07/03/24   Pressure Injury Stage Unstageable        Vascular:    MIRELLA:   No results found.    Lab Values:    Lab Results   Component Value Date/Time    WBC 11.0 (H) 06/30/2024 12:39 AM    RBC 4.10 (L) 06/30/2024 12:39 AM    HEMOGLOBIN 10.2 (L) 06/30/2024 12:39 AM    HEMATOCRIT 34.8 (L) 06/30/2024 12:39 AM    CREACTPROT 13.64 (H) 04/24/2024 03:30 PM    SEDRATEWES 34 (H) 04/24/2024 03:30 PM    HBA1C 11.2 (A) 05/23/2024 03:10 PM         Culture Results show:  No results found for this or any previous visit (from the past 720 hour(s)).    Pain Level/Medicated:  None, Tolerated without pain medication       INTERVENTIONS BY WOUND TEAM:  Chart and images reviewed. Discussed with bedside RN. All areas of concern (based on picture review, LDA review and discussion with bedside RN) have been thoroughly assessed. Documentation of areas based on significant findings. This RN in to assess patient. Performed standard wound care which includes appropriate positioning, dressing removal and non-selective debridement. Pictures and measurements obtained weekly if/when required.    Wound:  L axilla  Preparation for Dressing removal: Removed without difficulty  Nadeen wound: Cleansed with Normal Saline and Gauze, Prepped with No Sting  Primary Dressing:  moist roll gauze into wound bed  Secondary (Outer) Dressing: abd pad secured with hypafix tape making sure tape not over open wounds    Advanced Wound Care Discharge Planning  Number of Clinicians necessary to complete wound care: 2 one to hold arm  Is patient requiring IV pain  medications for dressing changes:  No   Length of time for dressing change 30 min. (This does not include chart review, pre-medication time, set up, clean up or time spent charting.)    Interdisciplinary consultation: Patient, Bedside RN (Xavier),     EVALUATION / RATIONALE FOR TREATMENT:     Date:  06/30/24  Wound Status:  Wound progressing as expected    Pt doesn't want the inside of the wound cleaned. Blot the outside of the wound to decrease pain. Removed Tubi  from LUE r/t edema and causing redness to skin as if tourniquet. RN had placed hydrocolloid over sacrococcygeal pressure injury. Pt to be seen later this week to reassess area.   Date:  06/26/24  Wound Status:  Initial evaluation    Pt with known cancerous wound to the left axilla. Pt has been doing NS wet to dry dressings at home and would like to stay with that dressing. Pt is very particular about care due to pain to the area and wife has been doing dressing changes at home. Pts sacrococcygeal area has a POA unstageable, per wife pt has spent the last couple weeks in a recliner chair due to difficulty breathing and has not been able to shower self. Pt declined any other dressings other than barrier paste. Barrier paste applied. Pt was educated on importance of turns. Pt is agreeable. Pts bilateral anterior ankles with sDTIs from home compression stockings. Stockings removed and tubigrip F applied for mild compression.          Goals: Steady decrease in wound area and depth weekly.    NURSING PLAN OF CARE ORDERS:  No new orders this visit    NUTRITION RECOMMENDATIONS   Wound Team Recommendations:  N/A     DIET ORDERS (From admission to next 24h)       Start     Ordered    06/27/24 1627  Diet Order Diet: Consistent CHO (Diabetic)  ALL MEALS        Question:  Diet:  Answer:  Consistent CHO (Diabetic)    06/27/24 1626                    PREVENTATIVE INTERVENTIONS:   Q shift Javad - performed per nursing policy  Q shift pressure point assessments -  performed per nursing policy    Surface/Positioning  ICU Low Airloss - Currently in Place  Reposition q 2 hours - Currently in Place  TAPs Turning system - Currently in Place    Offloading/Redistribution  Sacral offloading dressing (Silicone dressing) - Currently in Place  Heel offloading dressing (Silicone dressing) - Currently in Place  Float Heels off Bed with Pillows - Currently in Place           Respiratory  Gray Foam Ear protectors - Currently in Place    Containment/Moisture Prevention    Dri-omid pad - Currently in Place  Epps Catheter - Currently in Place  Barrier wipes - Currently in Place    Anticipated discharge plans:  TBD        Vac Discharge Needs:  Vac Discharge plan is purely a recommendation from wound team and not a requirement for discharge unless otherwise stated by physician.  Not Applicable Pt not on a wound vac

## 2024-06-30 NOTE — PROGRESS NOTES
Left Thoracentesis done by Dr Salvador by Dr Kaushik mondragon.     Time out 1128, all agreed. Consent signed by son who is POA.     1143 BP 84/52, SPO2 98 on max HFNC. HR 94    1147 - Procedure end, 1.7L drained.

## 2024-06-30 NOTE — DISCHARGE PLANNING
HTH/SCP chart review completed. Note, due to medical acuity, pt is now requiring ICU level of care. TCN services are not indicated at this time and therefore will defer to hospital CM. TCN will monitor pt for transfer to another floor or can be reached by Voalte if collaboration with the hospital discharge team is indicated. Thank you!

## 2024-06-30 NOTE — CARE PLAN
Problem: Humidified High Flow Nasal Cannula  Goal: Maintain adequate oxygenation dependent on patient condition  Description: Target End Date:  resolve prior to discharge or when underlying condition is resolved/stabilized    1.  Implement humidified high flow oxygen therapy  2.  Titrate high flow oxygen to maintain appropriate SpO2  Outcome: Progressing   30L/40%

## 2024-06-30 NOTE — PROCEDURES
Thoracentesis    Date/Time: 6/30/2024 4:21 PM    Performed by: Ro Salvador M.D.  Authorized by: Ro Salvador M.D.    Consent:     Consent obtained:  Verbal    Consent given by: son.    Risks, benefits, and alternatives were discussed: yes    Universal protocol:     Procedure explained and questions answered to patient or proxy's satisfaction: yes      Relevant documents present and verified: yes      Test results available: yes      Imaging studies available: yes      Required blood products, implants, devices, and special equipment available: yes      Site/side marked: yes      Immediately prior to procedure, a time out was called: yes      Patient identity confirmed:  Arm band  Sedation:     Sedation type:  None  Anesthesia:     Anesthesia method:  Local infiltration    Local anesthetic:  Lidocaine 1% w/o epi  Procedure details:     Preparation: Patient was prepped and draped in usual sterile fashion      Patient position:  Sitting    Location:  L lateral    Puncture method:  Over-the-needle catheter    Ultrasound guidance: no      Indwelling catheter placed: no      Needle gauge: 19G.    Catheter size: 5Fr.    Number of attempts:  1    Drainage characteristics:  Serosanguinous  Post-procedure details:     Post-procedure chest x-ray: ordered.      Procedure completion:  Tolerated well, no immediate complications  Comments:      Removed 1.7L          Ro Salvador MD  Pulmonary and Critical Care Medicine  [facility]

## 2024-07-01 VITALS
HEIGHT: 78 IN | SYSTOLIC BLOOD PRESSURE: 82 MMHG | HEART RATE: 91 BPM | BODY MASS INDEX: 25.81 KG/M2 | RESPIRATION RATE: 11 BRPM | TEMPERATURE: 97.4 F | WEIGHT: 223.11 LBS | DIASTOLIC BLOOD PRESSURE: 53 MMHG | OXYGEN SATURATION: 92 %

## 2024-07-01 LAB
ANION GAP SERPL CALC-SCNC: 12 MMOL/L (ref 7–16)
BACTERIA BLD CULT: NORMAL
BACTERIA BLD CULT: NORMAL
BACTERIA FLD AEROBE CULT: NORMAL
BASOPHILS # BLD AUTO: 0.2 % (ref 0–1.8)
BASOPHILS # BLD: 0.02 K/UL (ref 0–0.12)
BUN SERPL-MCNC: 107 MG/DL (ref 8–22)
CALCIUM SERPL-MCNC: 9 MG/DL (ref 8.5–10.5)
CHLORIDE SERPL-SCNC: 93 MMOL/L (ref 96–112)
CO2 SERPL-SCNC: 32 MMOL/L (ref 20–33)
CREAT SERPL-MCNC: 2.45 MG/DL (ref 0.5–1.4)
EOSINOPHIL # BLD AUTO: 0.03 K/UL (ref 0–0.51)
EOSINOPHIL NFR BLD: 0.3 % (ref 0–6.9)
ERYTHROCYTE [DISTWIDTH] IN BLOOD BY AUTOMATED COUNT: 52.8 FL (ref 35.9–50)
GFR SERPLBLD CREATININE-BSD FMLA CKD-EPI: 28 ML/MIN/1.73 M 2
GLUCOSE BLD STRIP.AUTO-MCNC: 173 MG/DL (ref 65–99)
GLUCOSE SERPL-MCNC: 125 MG/DL (ref 65–99)
GRAM STN SPEC: NORMAL
HCT VFR BLD AUTO: 33.4 % (ref 42–52)
HGB BLD-MCNC: 10.1 G/DL (ref 14–18)
IMM GRANULOCYTES # BLD AUTO: 0.19 K/UL (ref 0–0.11)
IMM GRANULOCYTES NFR BLD AUTO: 1.8 % (ref 0–0.9)
LYMPHOCYTES # BLD AUTO: 0.35 K/UL (ref 1–4.8)
LYMPHOCYTES NFR BLD: 3.3 % (ref 22–41)
MAGNESIUM SERPL-MCNC: 2.6 MG/DL (ref 1.5–2.5)
MCH RBC QN AUTO: 25.6 PG (ref 27–33)
MCHC RBC AUTO-ENTMCNC: 30.2 G/DL (ref 32.3–36.5)
MCV RBC AUTO: 84.6 FL (ref 81.4–97.8)
MONOCYTES # BLD AUTO: 1.41 K/UL (ref 0–0.85)
MONOCYTES NFR BLD AUTO: 13.3 % (ref 0–13.4)
NEUTROPHILS # BLD AUTO: 8.61 K/UL (ref 1.82–7.42)
NEUTROPHILS NFR BLD: 81.1 % (ref 44–72)
NRBC # BLD AUTO: 0.04 K/UL
NRBC BLD-RTO: 0.4 /100 WBC (ref 0–0.2)
PHOSPHATE SERPL-MCNC: 4.8 MG/DL (ref 2.5–4.5)
PLATELET # BLD AUTO: 80 K/UL (ref 164–446)
PLATELETS.RETICULATED NFR BLD AUTO: 12.8 % (ref 0.6–13.1)
POTASSIUM SERPL-SCNC: 5 MMOL/L (ref 3.6–5.5)
RBC # BLD AUTO: 3.95 M/UL (ref 4.7–6.1)
SIGNIFICANT IND 70042: NORMAL
SITE SITE: NORMAL
SODIUM SERPL-SCNC: 137 MMOL/L (ref 135–145)
SOURCE SOURCE: NORMAL
WBC # BLD AUTO: 10.6 K/UL (ref 4.8–10.8)

## 2024-07-01 PROCEDURE — 85055 RETICULATED PLATELET ASSAY: CPT

## 2024-07-01 PROCEDURE — 84100 ASSAY OF PHOSPHORUS: CPT

## 2024-07-01 PROCEDURE — 80048 BASIC METABOLIC PNL TOTAL CA: CPT

## 2024-07-01 PROCEDURE — 99239 HOSP IP/OBS DSCHRG MGMT >30: CPT | Performed by: HOSPITALIST

## 2024-07-01 PROCEDURE — 700111 HCHG RX REV CODE 636 W/ 250 OVERRIDE (IP): Performed by: HOSPITALIST

## 2024-07-01 PROCEDURE — 83735 ASSAY OF MAGNESIUM: CPT

## 2024-07-01 PROCEDURE — 85025 COMPLETE CBC W/AUTO DIFF WBC: CPT

## 2024-07-01 RX ORDER — LORAZEPAM 2 MG/ML
2 INJECTION INTRAMUSCULAR
Status: DISCONTINUED | OUTPATIENT
Start: 2024-07-01 | End: 2024-07-01 | Stop reason: HOSPADM

## 2024-07-01 RX ORDER — HYDROMORPHONE HYDROCHLORIDE 1 MG/ML
1 INJECTION, SOLUTION INTRAMUSCULAR; INTRAVENOUS; SUBCUTANEOUS
Status: DISCONTINUED | OUTPATIENT
Start: 2024-07-01 | End: 2024-07-01 | Stop reason: HOSPADM

## 2024-07-01 RX ORDER — LORAZEPAM 2 MG/ML
1-2 INJECTION INTRAMUSCULAR
Status: DISCONTINUED | OUTPATIENT
Start: 2024-07-01 | End: 2024-07-01

## 2024-07-01 RX ORDER — HYDROMORPHONE HYDROCHLORIDE 1 MG/ML
1 INJECTION, SOLUTION INTRAMUSCULAR; INTRAVENOUS; SUBCUTANEOUS
Status: DISCONTINUED | OUTPATIENT
Start: 2024-07-01 | End: 2024-07-01

## 2024-07-01 RX ORDER — ATROPINE SULFATE 10 MG/ML
2 SOLUTION/ DROPS OPHTHALMIC EVERY 4 HOURS PRN
Status: DISCONTINUED | OUTPATIENT
Start: 2024-07-01 | End: 2024-07-01 | Stop reason: HOSPADM

## 2024-07-01 RX ADMIN — Medication 1 MG/HR: at 08:00

## 2024-07-01 RX ADMIN — LORAZEPAM 2 MG: 2 INJECTION INTRAMUSCULAR; INTRAVENOUS at 08:02

## 2024-07-01 RX ADMIN — HYDROMORPHONE HYDROCHLORIDE 1 MG: 1 INJECTION, SOLUTION INTRAMUSCULAR; INTRAVENOUS; SUBCUTANEOUS at 08:01

## 2024-07-01 ASSESSMENT — ENCOUNTER SYMPTOMS
FEVER: 0
SHORTNESS OF BREATH: 1

## 2024-07-01 ASSESSMENT — FIBROSIS 4 INDEX: FIB4 SCORE: 3.75

## 2024-07-01 NOTE — CARE PLAN
The patient is Watcher - Medium risk of patient condition declining or worsening    Shift Goals  Clinical Goals: Decrease oxygen demand and work of breathing  Patient Goals: comfort and pain control  Family Goals: comfort    Progress made toward(s) clinical / shift goals:    Problem: Knowledge Deficit - Standard  Goal: Patient and family/care givers will demonstrate understanding of plan of care, disease process/condition, diagnostic tests and medications  Outcome: Progressing     Problem: Psychosocial  Goal: Patient's level of anxiety will decrease  Outcome: Progressing     Problem: Hemodynamics  Goal: Patient's hemodynamics, fluid balance and neurologic status will be stable or improve  Outcome: Progressing     Problem: Respiratory  Goal: Patient will achieve/maintain optimum respiratory ventilation and gas exchange  Outcome: Progressing     Problem: Pain - Standard  Goal: Alleviation of pain or a reduction in pain to the patient’s comfort goal  Outcome: Progressing

## 2024-07-01 NOTE — WOUND TEAM
Wound team re-consulted for bilateral heels, R medial ankle and bilateral elbows.  Patient was recently seen today (6/30) for left axilla follow-up and will be later this week for follow-up and will see the concern listed with follow-up.  Continue with offloading measures for all areas and continue with Q2 turns.  Wound consult completed and placed on follow-up list.

## 2024-07-01 NOTE — PROGRESS NOTES
Hospital Medicine Daily Progress Note    Date of Service  7/1/2024    Chief Complaint  Jama Altman is a 68 y.o. male admitted 6/25/2024 with shortness of breath and worsening kidney function    Hospital Course  Agapito is a 68 y.o. 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  He was sent for admission by Dr. León/nephrology.  Blood work showed WBC 12.1, hemoglobin 11.5, 9, potassium 6.5, glucose 231, creatinine 3.7, .  His weight is 248 pounds.  Chest x-ray: Moderate sized bilateral pleural effusions, bibasilar opacities, moderate cardiomegaly  He was started on IV bicarb and will be admitted to IMCU  He has blood pressure 83/60.  He states that he always has low blood pressure and is on midodrine at home 10 mg 3 times daily.  He is on 4 L nasal cannula  Currently not in respiratory distress  Patient aware that possibly he will need to be started on dialysis.  He states that lately he has to push hard to urinate     Treatment in ER included dextrose/insulin, IV bicarb, nebulizer of albuterol, IV Lasix 80 mg       Interval Problem Update  6/26/2024: Mr. Altman was evaluated in the IMCU.  He is on IV bicarb drip with 3 amp of bicarb at 150 mL/h.  His creatinine this morning is 3.46 with a potassium 5.9. his significant other, Ashely is at bedside.  I have reached out to Dr. Fajardo his oncologist to let him know he is here.  Thoracentesis was done today with 2 L  out the left lung his uric acid came back over 13 and he has been started on allopurinol.  6/27: Mr. Altman was seen in the [facility].  He remains on a bicarb drip at 42 mL/h.  Creatinine today is 3.12.  Potassium remains high at 5.6.  He has been started on allopurinol and his uric acid remains high at 12. Ashely is at bedside. He is on stress-dose steroids and and current blood pressure is 105/65. He is pending a kidney biopsy. Dr. Fajardo saw him this morning. I had a long discussion with Mr. Altman and Ashely about his condition and explained the ongoing IV fluids due to need for intravascular pressure in the setting of progressive extravascular peripheral edema. We discussed that he may require dialysis in order to pull fluid.   6/28: Mr. Altman was evaluated in the [facility]. He has been on a bicarb drip at 42 mL/hour. His Cr is 2.9 today.  300 mL urine over night and 400 mL this morning. I had a long discussion with Mr. Altman, his significant other, Ashely, his daughter Mena, and granddaughter Rayne. We discussed that he has a terminal disease process with respect to the squamous cell and he is on palliative immunotherapy which may be contributing to the worsening renal function. We discussed that he may need to go onto dialysis in order to tolerate the immunotherapy. We await the kidney biopsy.   6/29: Mr. Altman was seen in the [facility]. The bicarb drip was turned off yesterday. His Cr today is 2.77 down from 2.9 yesterday. Kidney biopsy was sent off. He has been tachy afib in the low hundreds and up to 150 with activity. He has been on metoprolol 100 mg BID as an outpatient which was held due to hypotension and will be given this morning.   6/30: Mr. Altman was evaluated on the oncology floor. He has been on midodrine and his blood pressure has been in the 110-120 systolic range. He was on 8 liters of oxygen earlier and now is on a nonrebreather.  He is an extremis.  He has made it very clear that he is suffering.  He will be transferred  to the [facility] for emergent thoracentesis.  I spoke with Dr. Sheppard nephrology and she is agreeable to 80 mg IV Lasix.  X-ray reveals bilateral pleural effusions.  Mr. Altman is oriented x 4 and has elected for DNR/DNI status though full treatment otherwise.  7/1: I met with Mr. Altman, Ashely, and his daughter and son. Mr. Altman has elected for comfort care. He will be initiated on an IV dilaudid drip with titration and IV dilaudid pushes for pain and ativan pushes for anxiety. I contacted Dr. Hirsch about this change. End of life.    I have discussed this patient's plan of care and discharge plan at IDT rounds today with Case Management, Nursing, Nursing leadership, and other members of the IDT team.    Consultants/Specialty  nephrology  Oncology     Code Status  Comfort Care/DNR    Disposition  The patient is not medically cleared for discharge to home or a post-acute facility.  Anticipate discharge to: hospice    I have placed the appropriate orders for post-discharge needs.    Review of Systems  Review of Systems   Constitutional:  Positive for malaise/fatigue. Negative for fever.        Generally weak   Respiratory:  Positive for shortness of breath.    Cardiovascular:  Positive for leg swelling. Negative for chest pain.   All other systems reviewed and are negative.       Physical Exam  Temp:  [36.3 °C (97.4 °F)-36.6 °C (97.9 °F)] 36.3 °C (97.4 °F)  Pulse:  [] 91  Resp:  [10-37] 11  BP: ()/(50-74) 82/53  SpO2:  [88 %-100 %] 92 %    Physical Exam  Vitals and nursing note reviewed.   Constitutional:       General: He is in acute distress.      Appearance: He is ill-appearing and toxic-appearing.   HENT:      Mouth/Throat:      Mouth: Mucous membranes are dry.   Cardiovascular:      Rate and Rhythm: Tachycardia present. Rhythm irregular.      Comments: Left back has an edematous region  Pulmonary:      Comments: Tachypnea, profoundly short of breath, markedly decreased air movement at the bases and crackles at  the apices  On a nonrebreather  Abdominal:      General: There is distension.      Tenderness: There is no abdominal tenderness.   Musculoskeletal:      Right lower leg: Edema present.      Left lower leg: Edema present.      Comments: Left arm swollen> right  Left axilla open wound is packed   Skin:     General: Skin is dry.      Coloration: Skin is pale.   Neurological:      General: No focal deficit present.      Mental Status: He is alert and oriented to person, place, and time.   Psychiatric:         Behavior: Behavior normal.         Fluids    Intake/Output Summary (Last 24 hours) at 7/1/2024 0740  Last data filed at 7/1/2024 0600  Gross per 24 hour   Intake 620 ml   Output 1575 ml   Net -955 ml       Laboratory  Recent Labs     06/29/24 0340 06/30/24  0039 07/01/24  0305   WBC 9.2 11.0* 10.6   RBC 4.03* 4.10* 3.95*   HEMOGLOBIN 10.2* 10.2* 10.1*   HEMATOCRIT 33.5* 34.8* 33.4*   MCV 83.1 84.9 84.6   MCH 25.3* 24.9* 25.6*   MCHC 30.4* 29.3* 30.2*   RDW 51.3* 52.2* 52.8*   PLATELETCT 85* 94* 80*   MPV 12.6  --   --      Recent Labs     06/29/24 0340 06/30/24  0039 07/01/24  0305   SODIUM 133* 135 137   POTASSIUM 5.1 4.7 5.0   CHLORIDE 89* 91* 93*   CO2 34* 33 32   GLUCOSE 214* 124* 125*   * 107* 107*   CREATININE 2.77* 2.63* 2.45*   CALCIUM 8.8 9.0 9.0                   Imaging  DX-CHEST-LIMITED (1 VIEW)   Final Result         No pneumothorax.      DX-CHEST-PORTABLE (1 VIEW)   Final Result      1.  Bilateral pleural effusion which are probably significant in size      2.  Bilateral atelectasis      DX-CHEST-PORTABLE (1 VIEW)   Final Result      BILATERAL pleural effusions with increased overlying atelectasis and suspected superimposed pulmonary edema or pneumonia      CT-NEEDLE CORE BX-RENAL   Final Result      CT-guided core biopsy of the right lower quadrant transplant kidney.      DX-CHEST-PORTABLE (1 VIEW)   Final Result      1.  Persistent hypoinflation with small bilateral pleural effusions and  bibasilar consolidation.   2.  No significant change from prior exam.   3.  No pneumothorax.      US-THORACENTESIS PUNCTURE LEFT   Final Result      1. Ultrasound guided left sided diagnostic and therapeutic thoracentesis.      2. 2050 mL of fluid withdrawn.      US-RENAL TRANSPLANT COMP   Final Result         1.  Normal resistive indices and acceleration time      DX-CHEST-PORTABLE (1 VIEW)   Final Result      1.  Moderate-sized bilateral pleural effusions      2.  Bibasilar airspace opacities consistent with atelectasis, pneumonitis, and/or pleural effusion..      3.  Moderate cardiomegaly.           Assessment/Plan  * SANKET (acute kidney injury) (HCC) on CKD stage III- (present on admission)  Assessment & Plan  Unclear etiology  IV bicarb drip was given and turned off due to volume overload  Nephrology consulted  Follow urine output and basic metabolic panel ordered for the morning  Kidney biopsy 6/27  Renal ultrasound did not reveal abnormalities      DNR (do not resuscitate)  Assessment & Plan  Mr. Altman has elected for comfort care    Hyperkalemia- (present on admission)  Assessment & Plan  Potassium 6.5, likely secondary to progression of renal failure  Treated in ER with albuterol nebulizer, insulin/dextrose, IV bicarb      Pleural effusion- (present on admission)  Assessment & Plan  Bilateral moderate pleural effusions noted.   2 liters off the left side on 6/26 in which cells were positive for squamous cell carcinoma thus it is a malignant pleural effusion.  On 6/30 he has bilateral effusions with 1.7 liters off the left side again.      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 Fajardo was 3 weeks ago though he has not been tolerating it.   Dr. Fajardo has consulted  Transition to comfort care      Long term (current) use of systemic steroids- (present on admission)  Assessment & Plan  Immunocompromised.  On prednisone 20 mg     Abdominal aortic aneurysm (AAA) without rupture (HCC)- (present on admission)  Assessment & Plan  History of    Secondary adrenal insufficiency (HCC)- (present on admission)  Assessment & Plan  Secondary to chronic steroid use      Acute respiratory failure with hypoxia (HCC)- (present on admission)  Assessment & Plan  Acute respiratory failure for which he benefited from a left-sided thoracentesis with 2 L off.  On 6/30/2024 he was in respiratory distress requiring nonrebreather and had another 1.7 L off the left lung.  He also has pulmonary edema.  80 mg IV Lasix ordered  Consideration for diuresis though limited by his kidney disease versus dialysis  Chest xray on 6/30 reveals bilateral pleural effusions, pulmonary edema   Transferred to [facility] with continuous pulse oximetry thoracentesis  Now comfort care and taper to room air    A-fib (HCC) s/p Watchman- (present on admission)  Assessment & Plan  Noted.  Not on anticoagulation    Type 2 diabetes mellitus (HCC)- (present on admission)  Assessment & Plan  Uncontrolled with ongoing hyperglycemia   Increase Lantus from 20 units every evening to 25 units, insulin sliding scale   Decreased steroids down to 20 mg of prednisone which is his recent baseline  Diabetic  diet  HbA1c 11.2    Thrombocytopenia (HCC)- (present on admission)  Assessment & Plan  Platelet count 94      Primary hypertension- (present on admission)  Assessment & Plan  Will hold Toprol due to hypotension    History of renal transplant- (present on admission)  Assessment & Plan  Poor renal function  Continue sirolimus, prednisone  Transplant kidney biopsy ordered was done 6/27  Nephrology consulted         VTE prophylaxis:     I have performed a physical exam and reviewed and updated ROS and Plan today (7/1/2024). In review of yesterday's note (6/30/2024), there are no changes except as documented above.

## 2024-07-01 NOTE — PROGRESS NOTES
0840: Rounded on patient to assess comfort level post initiation of comfort measures. Patient displaying agonal breaths, but appearing comfortable.    0845: Patient asystole on monitor.    0851: Dr. Faulkner to bedside to pronounce.     0853: Dr. Faulkner to confirm time of death 0853.

## 2024-07-01 NOTE — PROGRESS NOTES
The nurse informed that the patient is more hypoxic and lethargic. He was arousal to painful stimuli. Patient was having frequent runs a NSVT. I offered and ABG, bipap or high flow. The wife at bedside and the patient both refused. The wife and patient will like to discuss about comfort care measures.

## 2024-07-01 NOTE — DISCHARGE SUMMARY
Death Summary    Cause of Death  Metastatic squamous cell carcinoma .    Comorbid Conditions at the Time of Death  Principal Problem:    SANKTE (acute kidney injury) (HCC) on CKD stage III (POA: Yes)  Active Problems:    History of renal transplant (POA: Yes)      Overview:     Primary hypertension (POA: Yes)      Overview:     Thrombocytopenia (HCC) (POA: Yes)    Type 2 diabetes mellitus (HCC) (POA: Yes)    A-fib (HCC) s/p Watchman (POA: Yes)    Acute respiratory failure with hypoxia (HCC) (POA: Yes)    Secondary adrenal insufficiency (HCC) (POA: Yes)    Abdominal aortic aneurysm (AAA) without rupture (HCC) (POA: Yes)    Long term (current) use of systemic steroids (POA: Yes)    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 *      History of Presenting Illness and Hospital Course  This is a 68 y.o. male admitted 6/25/2024 with shortness of breath and worsening kidney function     Mr. Altman is a 68 y.o. 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  He was sent for admission by Dr. León/nephrology.  Blood work showed WBC 12.1, hemoglobin 11.5, 9, potassium 6.5, glucose  231, creatinine 3.7, .  His weight is 248 pounds.  Chest x-ray: Moderate sized bilateral pleural effusions, bibasilar opacities, moderate cardiomegaly  He was started on IV bicarb and will be admitted to IMCU  He has blood pressure 83/60.  He states that he always has low blood pressure and is on midodrine at home 10 mg 3 times daily.    He was given IV bicarb drip and his renal function improves this moderately.  He had a kidney biopsy.  His oncologist Dr. Fajardo was consulted as well as nephrology.  He underwent thoracentesis x 2 on the left side which revealed a malignant pleural effusion.    2024 Mr. Altman has elected for comfort care.  His fiancée, Ashely, daughter, son are at bedside.  Comfort care measures were carried out and he  in comfort and with dignity with his family members at bedside.  I spent 35 minutes with patient, family, nursing including proclamation of death.       I personally declared his passing as he had no pulse, no respirations, no cardiac sounds, and asystole on tele.   Time of death 08:53

## 2024-07-01 NOTE — PROGRESS NOTES
Enrrique MONIQUE notified of difficulty rousing patient and use of painful stimuli to obtain localizing response but unable to get (baseline) alert and oriented response.    0620 MD at bedside.

## 2024-07-01 NOTE — PROGRESS NOTES
Dr. Osuna notified of increased ectopy and short runs of non-symptomatic Vtach. New orders received.

## 2024-07-03 ENCOUNTER — APPOINTMENT (OUTPATIENT)
Dept: PHYSICAL THERAPY | Facility: REHABILITATION | Age: 69
End: 2024-07-03
Attending: STUDENT IN AN ORGANIZED HEALTH CARE EDUCATION/TRAINING PROGRAM
Payer: MEDICARE

## 2024-07-08 ENCOUNTER — APPOINTMENT (OUTPATIENT)
Dept: PHYSICAL THERAPY | Facility: REHABILITATION | Age: 69
End: 2024-07-08
Attending: STUDENT IN AN ORGANIZED HEALTH CARE EDUCATION/TRAINING PROGRAM
Payer: MEDICARE

## 2024-07-09 ENCOUNTER — APPOINTMENT (OUTPATIENT)
Dept: WOUND CARE | Facility: MEDICAL CENTER | Age: 69
End: 2024-07-09
Attending: NURSE PRACTITIONER
Payer: MEDICARE

## 2024-07-10 ENCOUNTER — APPOINTMENT (OUTPATIENT)
Dept: PHYSICAL THERAPY | Facility: REHABILITATION | Age: 69
End: 2024-07-10
Attending: STUDENT IN AN ORGANIZED HEALTH CARE EDUCATION/TRAINING PROGRAM
Payer: MEDICARE

## 2024-07-15 ENCOUNTER — APPOINTMENT (OUTPATIENT)
Dept: PHYSICAL THERAPY | Facility: REHABILITATION | Age: 69
End: 2024-07-15
Attending: STUDENT IN AN ORGANIZED HEALTH CARE EDUCATION/TRAINING PROGRAM
Payer: MEDICARE

## 2024-07-17 ENCOUNTER — APPOINTMENT (OUTPATIENT)
Dept: PHYSICAL THERAPY | Facility: REHABILITATION | Age: 69
End: 2024-07-17
Attending: STUDENT IN AN ORGANIZED HEALTH CARE EDUCATION/TRAINING PROGRAM
Payer: MEDICARE

## 2024-07-22 ENCOUNTER — APPOINTMENT (OUTPATIENT)
Dept: PHYSICAL THERAPY | Facility: REHABILITATION | Age: 69
End: 2024-07-22
Attending: STUDENT IN AN ORGANIZED HEALTH CARE EDUCATION/TRAINING PROGRAM
Payer: MEDICARE

## 2024-07-23 ENCOUNTER — APPOINTMENT (OUTPATIENT)
Dept: WOUND CARE | Facility: MEDICAL CENTER | Age: 69
End: 2024-07-23
Attending: NURSE PRACTITIONER
Payer: MEDICARE

## 2024-07-23 LAB
FUNGUS SPEC CULT: NORMAL
FUNGUS SPEC FUNGUS STN: NORMAL
SIGNIFICANT IND 70042: NORMAL
SITE SITE: NORMAL
SOURCE SOURCE: NORMAL

## 2024-08-06 ENCOUNTER — APPOINTMENT (OUTPATIENT)
Dept: WOUND CARE | Facility: MEDICAL CENTER | Age: 69
End: 2024-08-06
Attending: NURSE PRACTITIONER
Payer: MEDICARE

## 2024-08-08 LAB
MYCOBACTERIUM SPEC CULT: NORMAL
RHODAMINE-AURAMINE STN SPEC: NORMAL
SIGNIFICANT IND 70042: NORMAL
SITE SITE: NORMAL
SOURCE SOURCE: NORMAL

## 2024-08-13 NOTE — CONSULTS
[facility] -  CONSULTATION NOTE               Author: Live Hirsch M.D. 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 Initially started on broad spectrum abx and volume expansion but developed shock promoting transfer to the ICU with initiation of pressors. Underwent IR drain placement on necrotic lesions on 3/7. Stress dose steroids. MMF held, tacrolimus transiently held. Shock resolved, transferred out of ICU. Hem/onc consulted, without plans for inpatient therapy.  Baseline cr appears ~1.8, on FK 1mg BID, MMF, pred at home, notes stable levels for many years. Cr elevated to 2.6 on admission and has remained stable since. UA on 3/7 without blood or protein. Urine output not well documented.  Currently without chest pain, sob. Some mild nausea. No ongoing f/c/s.     REVIEW OF SYSTEMS:    12 point ROS performed, negative other than stated above    PAST MEDICAL HISTORY:   Past Medical History:   Diagnosis Date    A-fib (HCC)     Arrhythmia     Benign essential hypertension     Diabetes (HCC)     type 2    Heart burn     Hemorrhagic disorder (HCC)     Eliquis    Hyperlipoproteinemia     Hypertension     not on meds anymore    Indigestion     Myocardial infarct (HCC) 2013    stent    Polycystic kidney 09/10/2010    RIGHT KIDNEY TRANSPLANT    [facility] left atrial appendage closure device 02/29/2024    Sleep apnea 01/30/2024    states he was told he had sleep apnea but has not had a sleep study    Snoring        PAST SURGICAL HISTORY:      Past Surgical  History:   Procedure Laterality Date    STENT PLACEMENT  2013    cardiac    KNEE MANIPULATION  02/16/2012    Performed by LATOYA CONNER at SURGERY [facility]    KNEE UNICOMPARTMENTAL  12/23/2011    Performed by LATOYA CONNER at SURGERY [facility]    KNEE ARTHROSCOPY  12/23/2011    Performed by LATOYA CONNER at SURGERY [facility]    KNEE ARTHROSCOPY  05/03/2011    Performed by HANANE GOLDMAN at SURGERY [facility] DAY [facility]    MENISCECTOMY, KNEE, MEDIAL  05/03/2011    Performed by HANANE GOLDMAN at SURGERY SAME DAY [facility]    OTHER  09/10/2010    RIGHT KIDNEY TRANSPLANT    KNEE ARTHROPLASTY TOTAL  01/12/2007    RIGHT    KNEE ARTHROSCOPY  04/10/2006    RIGHT    OTHER ORTHOPEDIC SURGERY  07/08/1974    LEFT KNEE DEBRIDEMENT       FAMILY HISTORY:   +family history of renal disease    SOCIAL HISTORY:   No tobacco, No EtOH, No illicits    HOME MEDICATIONS:   No current facility-administered medications on file prior to encounter.     Current Outpatient Medications on File Prior to Encounter   Medication Sig Dispense Refill    metoprolol SR (TOPROL XL) 25 MG TABLET SR 24 HR Take 1 Tablet by mouth every day. May take additional one tab daily for heart rate sustaining >110 BPM. 100 Tablet 3    aspirin 81 MG EC tablet Take 1 Tablet by mouth every day. 100 Tablet 1    pioglitazone (ACTOS) 45 MG Tab TAKE ONE TABLET BY MOUTH ONCE DAILY 100 Tablet 3    gabapentin (NEURONTIN) 300 MG Cap TAKE 2 CAPSULES BY MOUTH TWICE DAILY 360 Capsule 1    linagliptin (TRADJENTA) 5 MG Tab tablet Take 1 Tablet by mouth every day. 100 Tablet 3    omeprazole (PRILOSEC) 20 MG delayed-release capsule Take 1 Capsule by mouth every day. 90 Capsule 3    mycophenolate (CELLCEPT) 500 MG tablet Take 500 mg by mouth 2 times a day.      glyBURIDE (DIABETA) 5 MG Tab Take 2 Tablets by mouth 2 times a day with meals. 400 Tablet 3    predniSONE (DELTASONE) 5 MG Tab Take 1 Tablet by mouth every day. 30 Tablet 0    tacrolimus (PROGRAF) 1 MG  "Cap Take 1 Capsule by mouth 2 times a day. (Patient taking differently: Take 1 mg by mouth 2 times a day. 0800 2000) 60 Capsule 3    atorvastatin (LIPITOR) 80 MG tablet Take 1 Tablet by mouth at bedtime. 100 Tablet 3    tadalafil (CIALIS) 5 MG tablet : TAKE 1 TABLETS 30 MINUTES BEFORE SEXUAL ACTIVITY, DO NOT EXCEED MORE THAN ONE DOSE A DAY 15 Tablet 3       ALLERGIES:  Doxycycline    PHYSICAL EXAM:  VS:  /63   Pulse 78   Temp 36.4 °C (97.6 °F) (Oral)   Resp 18   Ht 1.956 m (6' 5.01\")   Wt 115 kg (253 lb 8.5 oz)   SpO2 96%   BMI 30.06 kg/m²   GENERAL: no acute distress  CV: RRR, No pedal edema  RESP: non-labored  GI: Soft, no graft tenderness  MSK: No joint deformities   SKIN: R. Axillary edema/erythema surrounding lesion with drain in place  NEURO: AOx3  PSYCH: Cooperative    LABS:  Recent Results (from the past 24 hour(s))   POCT glucose device results    Collection Time: 03/08/24  2:42 PM   Result Value Ref Range    POC Glucose, Blood 358 (H) 65 - 99 mg/dL   POCT glucose device results    Collection Time: 03/08/24  9:07 PM   Result Value Ref Range    POC Glucose, Blood 330 (H) 65 - 99 mg/dL   CBC WITHOUT DIFFERENTIAL    Collection Time: 03/09/24  3:00 AM   Result Value Ref Range    WBC 13.8 (H) 4.8 - 10.8 K/uL    RBC 4.18 (L) 4.70 - 6.10 M/uL    Hemoglobin 11.8 (L) 14.0 - 18.0 g/dL    Hematocrit 37.8 (L) 42.0 - 52.0 %    MCV 90.4 81.4 - 97.8 fL    MCH 28.2 27.0 - 33.0 pg    MCHC 31.2 (L) 32.3 - 36.5 g/dL    RDW 46.7 35.9 - 50.0 fL    Platelet Count 81 (L) 164 - 446 K/uL    MPV 12.6 9.0 - 12.9 fL   Basic Metabolic Panel    Collection Time: 03/09/24  3:00 AM   Result Value Ref Range    Sodium 128 (L) 135 - 145 mmol/L    Potassium 5.4 3.6 - 5.5 mmol/L    Chloride 99 96 - 112 mmol/L    Co2 18 (L) 20 - 33 mmol/L    Glucose 317 (H) 65 - 99 mg/dL    Bun 63 (H) 8 - 22 mg/dL    Creatinine 2.55 (H) 0.50 - 1.40 mg/dL    Calcium 7.9 (L) 8.5 - 10.5 mg/dL    Anion Gap 11.0 7.0 - 16.0   IMMATURE PLT FRACTION    " Collection Time: 03/09/24  3:00 AM   Result Value Ref Range    Imm. Plt Fraction 8.0 0.6 - 13.1 %   ESTIMATED GFR    Collection Time: 03/09/24  3:00 AM   Result Value Ref Range    GFR (CKD-EPI) 27 (A) >60 mL/min/1.73 m 2   POCT glucose device results    Collection Time: 03/09/24  9:41 AM   Result Value Ref Range    POC Glucose, Blood 308 (H) 65 - 99 mg/dL       (click the triangle to expand results)    IMAGING:  US-EXTREMITY VENOUS UPPER UNILAT LEFT   Final Result      CT-DRAIN-SKIN - SUBCUTANEOUS   Final Result      1. Ultrasound GUIDED PLACEMENT OF A PERCUTANEOUS DRAINAGE CATHETER IN A LEFT AXILLARY FLUID COLLECTION.      2.  THE CURRENT PLAN IS TO MONITOR DRAINAGE OUTPUT AND OBTAIN A FOLLOWUP CT SCAN IN 5-7 DAYS IF CLINICALLY INDICATED.      US-RENAL   Final Result      1.  Normal appearance of the right lower quadrant transplant kidney.   2.  Native left kidney demonstrates no hydronephrosis. There are innumerable cysts with very little normal-appearing parenchyma.   3.  Rounded echogenic masslike area in the inferior bladder. It is uncertain if this is related to an enlarged prostate gland protruding into the bladder base or a bladder mass. Recommend clinical correlation with history of hematuria.      CT-CHEST (THORAX) W/O   Final Result      1.  Partially visualized large left axillary mass and necrotic adenopathy is consistent with known squamous cell carcinoma. Central hypodensity in the larger mass may be related to necrosis.      2.  0.9 cm pleural-based nodule in the right lower lobe is nonspecific, possibly present on the prior exam where there were patchy opacities. Follow-up per oncology protocol      3.  Patchy groundglass density and subsegmental atelectasis appears slightly improved from the prior CT. Findings are consistent with a chronic inflammatory/infectious process.      Fleischner Society pulmonary nodule recommendations:   Not Applicable             ASSESSMENT:  # SANKET: In setting of septic  shock.   -UA bland  # Renal Transplant  -Baseline cr appears ~1.8  -2010 at [facility] [place]  -On FK 1mg BID, MMF 500gm BID, pred 5mg at home  # Immunospurresed state  # Septic Shock:   -necrotic skin infection/abscess  -broad spectrum abx, s/p IR drain placement   # Hyponatremia:  # Acidosis   # SCC of the axilla:   -non-resectable.  -Per hem/onc liekly needs immunotherapy/rad therapy. Renal transplant complicating picture  # Anemia  # PAF  # DM  # CAD  # Thrombocytopenia: chornic    SUGGESTIONS:  -No need for further IV fluids  -Continue tacrolimus 1mg BID, holding on FK level for now given transiently held  -Restart MMF 500mg BID  -Restart pred 5mg daily once stress dose steroids stopped  -Will need to discuss case with transplant center/onc early next week to determine if need to switch to alternate IS therapy given malignancy/pending immunotherapy  -Abx per primary team  -Strict Ios  -Dose all meds per eGFR   -Daily labs    Discussed with [facility]    Thank you for this consult, we will continue to follow.    Live Hirsch MD       63

## 2025-02-05 ENCOUNTER — DOCUMENTATION (OUTPATIENT)
Dept: WOUND CARE | Facility: MEDICAL CENTER | Age: 70
End: 2025-02-05

## 2025-02-13 NOTE — H&P
Critical Care H&P      Date of Service: 7/1/2022    Requesting provider: Elisa Irvin MD    Chief Complaint: Septic shock (HCC)    History of Present Illness: Jama Altman is a 66 y.o. male with a PMH of type 2 diabetes, stage 3b CKD, atrial fibrillation, polycystic kidney disease status postrenal transplant on immunosuppression who presents with 2-3 days of dysuria with fever up to 104, chills, nausea, vomitting. Denies hematuria, but notes the urine was cloudy. Was having decreased oral intake for several days. Denies any sick contacts at [facility]. Was in the [facility] prior to being sent to the ER. Of note, patient was discharged 6/22 from [facility] after a prolonged stay for 29 days where he had E coli bacteremia.    Labs notable for WBC 14.7, 88% polys, Cr 1.98. UA with many bacteria, positive for nitrites and leukocytes. CXR showed some patchy infiltrates, CT renal colic had no acute findings. He received cefepime, 1L LR bolus in ER. Levophed drip was started due to hypotension in the ER.      Past Medical History:   has a past medical history of Benign essential hypertension, Hyperlipoproteinemia, Hypertension, Pain, Polycystic kidney (9/10/10), Sleep apnea, and Snoring.    Past Surgical History:   has a past surgical history that includes knee arthroplasty total (1/12/07); knee arthroscopy (4/10/06); other orthopedic surgery (7/8/74); other (9/10/10); knee arthroscopy (5/3/2011); meniscectomy, knee, medial (5/3/2011); knee unicompartmental (12/23/2011); knee arthroscopy (12/23/2011); and knee manipulation (2/16/2012).    Allergies:  Doxycycline    Medications:  No current facility-administered medications on file prior to encounter.     Current Outpatient Medications on File Prior to Encounter   Medication Sig Dispense Refill   • gabapentin (NEURONTIN) 300 MG Cap Take 300 mg by mouth at bedtime.     • glyBURIDE (DIABETA) 5 MG Tab Take 10 mg by mouth every morning with breakfast. 2 tablets = 10 mg.     •  normal appearance linagliptin (TRADJENTA) 5 MG Tab tablet Take 5 mg by mouth every day.     • carboxymethylcellulose (REFRESH TEARS) 0.5 % Solution Administer 1 Drop into both eyes 3 times a day.     • acetaminophen (TYLENOL) 325 MG Tab Take 325-650 mg by mouth every four hours as needed for Mild Pain, Moderate Pain or Fever. NOT TO EXCEED 3,000 mg of acetaminophen per 24 hours.     • predniSONE (DELTASONE) 5 MG Tab Take 1 Tablet by mouth every day. 30 Tablet 0   • mycophenolate (CELLCEPT) 250 MG Cap Take 2 Capsules by mouth 2 times a day. 20 Capsule 0   • tacrolimus (PROGRAF) 1 MG Cap Take 1 Capsule by mouth 2 times a day. 60 Capsule 3   • apixaban (ELIQUIS) 5mg Tab Take 1 Tablet by mouth 2 times a day. Indications: Thromboembolism secondary to Atrial Fibrillation 60 Tablet    • midodrine (PROAMATINE) 5 MG Tab Take 1 Tablet by mouth 2 times a day.     • atorvastatin (LIPITOR) 80 MG tablet Take 1 Tablet by mouth at bedtime. 90 Tablet 3   • SITagliptin (JANUVIA) 50 MG Tab Take 1 Tablet by mouth every day. 90 Tablet 3       Social History:   reports that he has never smoked. He has never used smokeless tobacco. He reports that he does not drink alcohol and does not use drugs.    Family History:  family history is not on file.    Review of Systems   Constitutional: Positive for chills, fever and malaise/fatigue.   HENT: Negative for ear pain and nosebleeds.    Eyes: Negative for blurred vision and double vision.   Respiratory: Negative for cough, sputum production and shortness of breath.    Cardiovascular: Negative for chest pain and palpitations.   Gastrointestinal: Positive for nausea and vomiting.   Genitourinary: Positive for dysuria. Negative for hematuria.   Musculoskeletal: Positive for back pain and myalgias.   Neurological: Negative for tingling and tremors.   Psychiatric/Behavioral: Negative for hallucinations. The patient is not nervous/anxious.        Vitals:    06/30/22 2228 06/30/22 2234 06/30/22 2238 06/30/22 2301  "  Height: 1.956 m (6' 5\")      Weight: 117 kg (258 lb)      Weight % change since last entry.: 0 %      BP:  (!) 97/53 (!) 97/53 (!) 97/56   Pulse:  (!) 117 (!) 126 (!) 117   BMI (Calculated): 30.59      Resp:  20     Temp:  (!) 38.7 °C (101.6 °F)      06/30/22 2334 06/30/22 2357 07/01/22 0018 07/01/22 0047   Height:       Weight:       Weight % change since last entry.:       BP: (!) 83/51 (!) 85/48 (!) 88/49 (!) 81/49   Pulse: (!) 119 (!) 122 (!) 117 (!) 122   BMI (Calculated):       Resp: 18 17 15 18   Temp:        07/01/22 0106 07/01/22 0121 07/01/22 0126 07/01/22 0216   Height:       Weight:       Weight % change since last entry.:       BP: (!) 80/43  (!) 75/44 (!) 99/58   Pulse: (!) 134  (!) 101 (!) 113   BMI (Calculated):       Resp: (!) 22  20    Temp:  37.5 °C (99.5 °F)         Physical Examination:  Physical Exam  Constitutional:       Appearance: He is not ill-appearing or toxic-appearing.   HENT:      Head: Normocephalic and atraumatic.      Right Ear: External ear normal.      Left Ear: External ear normal.      Mouth/Throat:      Comments: Small lesion on tongue, small sore on top of mouth  Eyes:      Extraocular Movements: Extraocular movements intact.      Pupils: Pupils are equal, round, and reactive to light.   Cardiovascular:      Rate and Rhythm: Regular rhythm. Tachycardia present.      Pulses: Normal pulses.      Heart sounds: Normal heart sounds.   Pulmonary:      Effort: Pulmonary effort is normal.      Breath sounds: Normal breath sounds.   Abdominal:      General: Abdomen is flat. Bowel sounds are normal.      Palpations: Abdomen is soft.      Tenderness: There is no abdominal tenderness.   Musculoskeletal:      Cervical back: Normal range of motion.      Right lower leg: No edema.      Left lower leg: No edema.   Skin:     General: Skin is warm and dry.      Comments: Wounds present on knees bilaterally and right shin ~ 1cm deep   Neurological:      General: No focal deficit present.      " Mental Status: He is alert and oriented to person, place, and time.   Psychiatric:         Mood and Affect: Mood normal.         Behavior: Behavior normal.         Thought Content: Thought content normal.          Laboratories:  Recent Labs     06/30/22  2245   WBC 14.7*   NEUTSPOLYS 88.70*   LYMPHOCYTES 2.90*   MONOCYTES 6.30   EOSINOPHILS 0.10   BASOPHILS 0.30   ASTSGOT 14   ALTSGPT 16   ALKPHOSPHAT 106*   TBILIRUBIN 0.4     Recent Labs     06/30/22  2245   SODIUM 133*   POTASSIUM 5.0   CHLORIDE 101   CO2 21   BUN 25*   CREATININE 1.98*   CALCIUM 7.8*     Recent Labs     06/30/22  2245   ALTSGPT 16   ASTSGOT 14   ALKPHOSPHAT 106*   TBILIRUBIN 0.4   GLUCOSE 155*         CT-RENAL COLIC EVALUATION(A/P W/O)   Final Result         1.  Changes compatible with polycystic kidney disease.   2.  Diverticulosis   3.  Fat-containing left inguinal hernia      DX-CHEST-PORTABLE (1 VIEW)   Final Result         1.  Patchy bilateral pulmonary infiltrates or edema.   2.  Atherosclerosis          Assessment/Plan:  * Septic shock (HCC)  Assessment & Plan  This is Sepsis Present on admission  SIRS criteria identified on my evaluation include: Fever, with temperature greater than 101 deg F, Tachycardia, with heart rate greater than 90 BPM and Leukocytosis, with WBC greater than 12,000  Source is Urinary  Sepsis protocol initiated  Fluid resuscitation ordered per protocol  Vasopressor support with levophed for goal MAP>65  Crystalloid Fluid Administration: Fluid resuscitation ordered per standard protocol - 30 mL/kg per current or ideal body weight  IV antibiotics as appropriate for source of sepsis - cefipeme  Reassessment: I have reassessed the patient's hemodynamic status          Urinary tract infection  Assessment & Plan  UA positive for UTI - nitrites, leukocytes, many bacteria  History of Ecoli bactremia 5/22  With septic shock  PLAN:  Continue cefipeme  Follow blood, urine cultures    Acute-on-chronic kidney injury  ([facility])  Assessment & Plan  Baseline CKD stage 3b  Likely secondary to poor oral intake  Monitor, should improve with fluids    History of renal transplant  Assessment & Plan  Renal Transplant 9/10/2010 2nd to Polycystic Kidney Disease  On cellcept, prednisone, prograf outpatient  Hold for now given septic shock, restart when clinically appropriate    Type 2 diabetes mellitus with kidney complication, without long-term current use of insulin ([facility])  Assessment & Plan  Insulin SSI/hypoglycemia protocol. Add lantus if BS not at goal 140-180. Holding home po diabetes meds         CODE STATUS: Full

## 2025-02-14 NOTE — ED TRIAGE NOTES
"Chief Complaint   Patient presents with   • N/V     BIBA from [facility] for n/v x 2 days and UTI symptoms x2 days, states having burning,frequency,cloudy color  RUQ and RLQ abdominal pain  Was recently discharged 1 week ago after admission for urosepsis  4 zofran PTA and 300ml   • Painful Urination       BP (!) 97/53   Pulse (!) 117   Temp (!) 38.7 °C (101.6 °F)   Resp 20   Ht 1.956 m (6' 5\")   Wt 117 kg (258 lb)   SpO2 91%   BMI 30.59 kg/m²     " home

## 2025-05-26 ENCOUNTER — TELEPHONE (OUTPATIENT)
Dept: WOUND CARE | Facility: MEDICAL CENTER | Age: 70
End: 2025-05-26

## 2025-05-26 NOTE — TELEPHONE ENCOUNTER
Request for supplemental documentation to justify wound supply order.     Provider clinical note from 5/28/2024 visit uploaded into [email or web address] portal.